# Patient Record
Sex: MALE | Race: WHITE | NOT HISPANIC OR LATINO | Employment: FULL TIME | URBAN - METROPOLITAN AREA
[De-identification: names, ages, dates, MRNs, and addresses within clinical notes are randomized per-mention and may not be internally consistent; named-entity substitution may affect disease eponyms.]

---

## 2018-03-01 ENCOUNTER — OFFICE VISIT (OUTPATIENT)
Dept: FAMILY MEDICINE CLINIC | Facility: CLINIC | Age: 58
End: 2018-03-01
Payer: COMMERCIAL

## 2018-03-01 VITALS
SYSTOLIC BLOOD PRESSURE: 150 MMHG | HEIGHT: 68 IN | TEMPERATURE: 98.3 F | WEIGHT: 184 LBS | DIASTOLIC BLOOD PRESSURE: 80 MMHG | BODY MASS INDEX: 27.89 KG/M2 | HEART RATE: 83 BPM | OXYGEN SATURATION: 98 %

## 2018-03-01 DIAGNOSIS — E55.9 VITAMIN D DEFICIENCY: ICD-10-CM

## 2018-03-01 DIAGNOSIS — E11.42 TYPE 2 DIABETES MELLITUS WITH DIABETIC POLYNEUROPATHY, WITHOUT LONG-TERM CURRENT USE OF INSULIN (HCC): ICD-10-CM

## 2018-03-01 DIAGNOSIS — E78.5 HYPERLIPIDEMIA ASSOCIATED WITH TYPE 2 DIABETES MELLITUS (HCC): ICD-10-CM

## 2018-03-01 DIAGNOSIS — I10 ESSENTIAL HYPERTENSION: ICD-10-CM

## 2018-03-01 DIAGNOSIS — Z12.5 PROSTATE CANCER SCREENING: ICD-10-CM

## 2018-03-01 DIAGNOSIS — E11.69 HYPERLIPIDEMIA ASSOCIATED WITH TYPE 2 DIABETES MELLITUS (HCC): ICD-10-CM

## 2018-03-01 DIAGNOSIS — F11.20 METHADONE MAINTENANCE THERAPY PATIENT (HCC): ICD-10-CM

## 2018-03-01 DIAGNOSIS — T73.2XXA FATIGUE DUE TO EXPOSURE, INITIAL ENCOUNTER: ICD-10-CM

## 2018-03-01 DIAGNOSIS — B18.2 CHRONIC HEPATITIS C WITHOUT HEPATIC COMA (HCC): Primary | ICD-10-CM

## 2018-03-01 DIAGNOSIS — I87.8 VENOUS STASIS: ICD-10-CM

## 2018-03-01 PROCEDURE — 99204 OFFICE O/P NEW MOD 45 MIN: CPT | Performed by: NURSE PRACTITIONER

## 2018-03-01 RX ORDER — LISINOPRIL 20 MG/1
20 TABLET ORAL
COMMUNITY
End: 2018-03-01 | Stop reason: SDUPTHER

## 2018-03-01 RX ORDER — METHADONE HYDROCHLORIDE 10 MG/5ML
70 SOLUTION ORAL
COMMUNITY
End: 2021-02-25 | Stop reason: HOSPADM

## 2018-03-01 RX ORDER — PRAVASTATIN SODIUM 20 MG
20 TABLET ORAL
COMMUNITY
End: 2018-03-01 | Stop reason: SDUPTHER

## 2018-03-01 RX ORDER — GLYBURIDE 5 MG/1
TABLET ORAL
COMMUNITY
Start: 2017-02-14 | End: 2018-03-01 | Stop reason: SDUPTHER

## 2018-03-01 RX ORDER — PRAVASTATIN SODIUM 20 MG
20 TABLET ORAL DAILY
Qty: 30 TABLET | Refills: 3 | Status: SHIPPED | OUTPATIENT
Start: 2018-03-01 | End: 2018-09-09 | Stop reason: SDUPTHER

## 2018-03-01 RX ORDER — GLYBURIDE 5 MG/1
5 TABLET ORAL 2 TIMES DAILY WITH MEALS
Qty: 60 TABLET | Refills: 3 | Status: SHIPPED | OUTPATIENT
Start: 2018-03-01 | End: 2018-03-29 | Stop reason: SDUPTHER

## 2018-03-01 RX ORDER — LISINOPRIL 20 MG/1
20 TABLET ORAL DAILY
Qty: 30 TABLET | Refills: 3 | Status: SHIPPED | OUTPATIENT
Start: 2018-03-01 | End: 2018-05-01 | Stop reason: SDUPTHER

## 2018-03-01 NOTE — PROGRESS NOTES
Assessment/Plan:    No problem-specific Assessment & Plan notes found for this encounter  Diagnoses and all orders for this visit:    Chronic hepatitis C without hepatic coma ([facility] )  Comments:  Pt follows with Dr Jorje Garcia has been treated for Hepatits C  and is reportedly free of Hep C    Type 2 diabetes mellitus with diabetic polyneuropathy, without long-term current use of insulin ([place] [address] )  Comments:  Rx for Janumet and Glyburide ordered  Labwork ordered  Orders:  -     UA w Reflex to Microscopic w Reflex to Culture -Lab Collect  -     Comprehensive metabolic panel  -     CBC and differential  -     TSH baseline; Future  -     HEMOGLOBIN A1C W/ EAG ESTIMATION  -     Lipid panel  -     C-peptide; Future  -     lisinopril (ZESTRIL) 20 mg tablet; Take 1 tablet (20 mg total) by mouth daily  -     sitaGLIPtin-metFORMIN (JANUMET)  MG per tablet; Take 1 tablet by mouth daily  -     glyBURIDE (DIABETA) 5 mg tablet; Take 1 tablet (5 mg total) by mouth 2 (two) times a day with meals  -     Occult Bloood,Fecal Immunochemical; Future    Essential hypertension  Comments:  Bp 150/80 today   Lisinopril reorderd  Labwork ordered  Orders:  -     UA w Reflex to Microscopic w Reflex to Culture -Lab Collect  -     Comprehensive metabolic panel  -     CBC and differential  -     TSH baseline; Future  -     HEMOGLOBIN A1C W/ EAG ESTIMATION  -     Lipid panel  -     lisinopril (ZESTRIL) 20 mg tablet; Take 1 tablet (20 mg total) by mouth daily  -     sitaGLIPtin-metFORMIN (JANUMET)  MG per tablet; Take 1 tablet by mouth daily  -     Occult Bloood,Fecal Immunochemical; Future    Hyperlipidemia associated with type 2 diabetes mellitus ([place] [address] )  Comments:  labwork ordered  Orders:  -     Comprehensive metabolic panel  -     CBC and differential  -     TSH baseline; Future  -     HEMOGLOBIN A1C W/ EAG ESTIMATION  -     Lipid panel  -     lisinopril (ZESTRIL) 20 mg tablet;  Take 1 tablet (20 mg total) by mouth daily  - sitaGLIPtin-metFORMIN (JANUMET)  MG per tablet; Take 1 tablet by mouth daily  -     pravastatin (PRAVACHOL) 20 mg tablet; Take 1 tablet (20 mg total) by mouth daily    Methadone maintenance therapy patient [facility])  Comments:  pt follows with Methadone clinic is on daily Methadone    Fatigue due to exposure, initial encounter  Comments:  Labwork ordered  Orders:  -     CBC and differential  -     TSH baseline; Future  -     HEMOGLOBIN A1C W/ EAG ESTIMATION  -     Lipid panel  -     Occult Bloood,Fecal Immunochemical; Future    Vitamin D deficiency  Comments:  Labwork ordered  Orders:  -     Vitamin D 25 hydroxy    Venous stasis  Comments:  Pt instructed to apply lotion daily to lower extremities to avoid skin breakdown  Orders:  -     Ambulatory referral to Podiatry; Future    Prostate cancer screening  Comments:  Labwork ordered  Orders:  -     PSA, total and free    Other orders  -     Discontinue: glyBURIDE (DIABETA) 5 mg tablet;   -     Canagliflozin (INVOKANA) 300 MG TABS;   -     Discontinue: lisinopril (ZESTRIL) 20 mg tablet; Take 20 mg by mouth  -     Discontinue: pravastatin (PRAVACHOL) 20 mg tablet; Take 20 mg by mouth  -     Discontinue: sitaGLIPtin-metFORMIN (JANUMET)  MG per tablet; Take 1 tablet by mouth  -     methadone (DOLOPHINE) 10 MG/5ML solution; Take 70 mg by mouth          Subjective:      Patient ID: Marcos Francois is a 59 y o  male here to [facility]   Pt was a former pt of Dr Lianet Child and has not been seen since March 2017  Pt has received Harvoni for Hep C from MD in [facility] and reportedly is free of Hepatitis C at this time  Pt also follows with Methadone clinic in [place]  weekly  Pt reports he hasn't been feeling well is known diabetic who ran out of his medication      HPI    The following portions of the patient's history were reviewed and updated as appropriate:   He  has no past medical history on file    He   Patient Active Problem List    Diagnosis Date Noted    Fatigue due to exposure 04/14/2017    Essential hypertension 04/13/2017    Hyperlipidemia associated with type 2 diabetes mellitus ([facility] ) 04/13/2017    Methadone maintenance therapy patient ([place] [address] ) 04/13/2017    Type 2 diabetes mellitus with diabetic polyneuropathy ([place] [address] ) 04/13/2017    Chronic hepatitis C without hepatic coma ([place] [address] ) 02/27/2017     He  has no past surgical history on file  His family history is not on file  He  reports that he has quit smoking  He has never used smokeless tobacco  He reports that he does not drink alcohol or use drugs  Current Outpatient Prescriptions   Medication Sig Dispense Refill    Canagliflozin (INVOKANA) 300 MG TABS       glyBURIDE (DIABETA) 5 mg tablet Take 1 tablet (5 mg total) by mouth 2 (two) times a day with meals 60 tablet 3    lisinopril (ZESTRIL) 20 mg tablet Take 1 tablet (20 mg total) by mouth daily 30 tablet 3    methadone (DOLOPHINE) 10 MG/5ML solution Take 70 mg by mouth      pravastatin (PRAVACHOL) 20 mg tablet Take 1 tablet (20 mg total) by mouth daily 30 tablet 3    sitaGLIPtin-metFORMIN (JANUMET)  MG per tablet Take 1 tablet by mouth daily 60 tablet 3     No current facility-administered medications for this visit  No current outpatient prescriptions on file prior to visit  No current facility-administered medications on file prior to visit  He has No Known Allergies       Review of Systems   Constitutional: Negative  Negative for appetite change and fatigue  HENT: Negative  Negative for congestion, ear pain, rhinorrhea and sinus pain  Eyes: Positive for visual disturbance  Negative for pain and itching  Respiratory: Negative  Negative for cough, shortness of breath and wheezing  Cardiovascular: Negative  Negative for chest pain, palpitations and leg swelling  Gastrointestinal: Negative  Negative for abdominal pain, constipation, diarrhea, nausea and vomiting  Endocrine: Negative    Negative for polydipsia, polyphagia and polyuria  Genitourinary: Negative  Negative for difficulty urinating, frequency and urgency  Musculoskeletal: Negative  Negative for back pain and joint swelling  Skin: Positive for color change  Negative for rash and wound  Allergic/Immunologic: Negative  Neurological: Negative  Negative for dizziness, weakness and headaches  Tingling lower extremities   Hematological: Negative  Negative for adenopathy  Does not bruise/bleed easily  Psychiatric/Behavioral: Negative  Negative for behavioral problems and sleep disturbance  The patient is not nervous/anxious  Objective:      /80   Pulse 83   Temp 98 3 °F (36 8 °C)   Ht 5' 8" (1 727 m)   Wt 83 5 kg (184 lb)   SpO2 98%   BMI 27 98 kg/m²          Physical Exam   Constitutional: He is oriented to person, place, and time  He appears well-developed and well-nourished  HENT:   Head: Normocephalic  Eyes: Conjunctivae and EOM are normal  Pupils are equal, round, and reactive to light  Neck: Normal range of motion  Neck supple  Cardiovascular: Normal rate and regular rhythm  Pulmonary/Chest: Effort normal and breath sounds normal    Abdominal: Soft  Bowel sounds are normal    Musculoskeletal: Normal range of motion  Neurological: He is alert and oriented to person, place, and time  Skin: Skin is warm and dry  Venous stasis dermatitis rash noted bilateral lower extremities   Psychiatric: He has a normal mood and affect  His behavior is normal  Judgment and thought content normal    Nursing note and vitals reviewed

## 2018-03-01 NOTE — PATIENT INSTRUCTIONS
Please purchase [employer] diabetic lotion and apply daily to both legs and feet    Stasis Dermatitis   WHAT YOU NEED TO KNOW:   Stasis dermatitis is a condition that develops when blood pools in your lower legs  It is caused by poor blood flow back to your heart  DISCHARGE INSTRUCTIONS:   Call 911 for any of the following:   · You feel lightheaded, short of breath, and have chest pain  · You cough up blood  Return to the emergency department if:   · Your leg feels warm, tender, and painful  It may look swollen and red  Contact your healthcare provider if:   · You have a fever  · Your pain is not getting better, even with treatment  · You have new or worse open sores  · Your sores are draining pus  · Your movement is limited  · You have questions or concerns about your condition or care  Medicines:   · Medicines  help improve blood flow from your legs to your heart and decrease swelling  · Take your medicine as directed  Contact your healthcare provider if you think your medicine is not helping or if you have side effects  Tell him of her if you are allergic to any medicine  Keep a list of the medicines, vitamins, and herbs you take  Include the amounts, and when and why you take them  Bring the list or the pill bottles to follow-up visits  Carry your medicine list with you in case of an emergency  Manage your symptoms:   · Wear pressure stockings  These tight elastic stockings put pressure on your legs  This improves blood flow and prevents blood from collecting in your legs  · Elevate  your legs above the level of your heart for 30 minutes, 4 times a day  This will help decrease swelling and pain  Prop your legs on pillows or blankets to keep them elevated comfortably  · Apply unscented lotions or creams  to help keep your skin moist and decrease itching  · Do not scratch your legs  Your skin can break open if you scratch   This can lead to sores or an infection  Lifestyle changes:   · Maintain a healthy weight  Ask your healthcare provider how much you should weigh  Ask him to help you create a weight loss plan if you are overweight  This will help improve your blood flow  · Eat a variety of healthy foods  Healthy foods include fruits, vegetables, whole-grain breads, low-fat dairy products, beans, lean meats, and fish  You may need to eat foods that are low in salt to help decrease swelling in your legs  · Exercise regularly  Ask your healthcare provider about the best exercise plan for you  Exercise improves the blood flow in your legs  Follow up with your healthcare provider as directed:  Write down your questions so you remember to ask them during your visits  © 2017 [address] Information is for End User's use only and may not be sold, redistributed or otherwise used for commercial purposes  All illustrations and images included in CareNotes® are the copyrighted property of [employer] , [employer]  or [employer]  The above information is an  only  It is not intended as medical advice for individual conditions or treatments  Talk to your doctor, nurse or pharmacist before following any medical regimen to see if it is safe and effective for you

## 2018-03-06 ENCOUNTER — APPOINTMENT (OUTPATIENT)
Dept: LAB | Facility: CLINIC | Age: 58
End: 2018-03-06
Payer: COMMERCIAL

## 2018-03-06 DIAGNOSIS — E11.42 TYPE 2 DIABETES MELLITUS WITH DIABETIC POLYNEUROPATHY, WITHOUT LONG-TERM CURRENT USE OF INSULIN (HCC): ICD-10-CM

## 2018-03-06 DIAGNOSIS — E11.69 HYPERLIPIDEMIA ASSOCIATED WITH TYPE 2 DIABETES MELLITUS (HCC): ICD-10-CM

## 2018-03-06 DIAGNOSIS — E78.5 HYPERLIPIDEMIA ASSOCIATED WITH TYPE 2 DIABETES MELLITUS (HCC): ICD-10-CM

## 2018-03-06 DIAGNOSIS — T73.2XXA FATIGUE DUE TO EXPOSURE, INITIAL ENCOUNTER: ICD-10-CM

## 2018-03-06 DIAGNOSIS — I10 ESSENTIAL HYPERTENSION: ICD-10-CM

## 2018-03-06 LAB
25(OH)D3 SERPL-MCNC: 11.5 NG/ML (ref 30–100)
ALBUMIN SERPL BCP-MCNC: 3.8 G/DL (ref 3.5–5)
ALP SERPL-CCNC: 125 U/L (ref 46–116)
ALT SERPL W P-5'-P-CCNC: 26 U/L (ref 12–78)
ANION GAP SERPL CALCULATED.3IONS-SCNC: 4 MMOL/L (ref 4–13)
AST SERPL W P-5'-P-CCNC: 18 U/L (ref 5–45)
BACTERIA UR QL AUTO: NORMAL /HPF
BASOPHILS # BLD AUTO: 0.04 THOUSANDS/ΜL (ref 0–0.1)
BASOPHILS NFR BLD AUTO: 1 % (ref 0–1)
BILIRUB SERPL-MCNC: 0.97 MG/DL (ref 0.2–1)
BILIRUB UR QL STRIP: NEGATIVE
BUN SERPL-MCNC: 18 MG/DL (ref 5–25)
CALCIUM SERPL-MCNC: 9 MG/DL (ref 8.3–10.1)
CHLORIDE SERPL-SCNC: 99 MMOL/L (ref 100–108)
CHOLEST SERPL-MCNC: 148 MG/DL (ref 50–200)
CLARITY UR: ABNORMAL
CO2 SERPL-SCNC: 30 MMOL/L (ref 21–32)
COLOR UR: ABNORMAL
CREAT SERPL-MCNC: 1.04 MG/DL (ref 0.6–1.3)
EOSINOPHIL # BLD AUTO: 0.24 THOUSAND/ΜL (ref 0–0.61)
EOSINOPHIL NFR BLD AUTO: 3 % (ref 0–6)
ERYTHROCYTE [DISTWIDTH] IN BLOOD BY AUTOMATED COUNT: 12.3 % (ref 11.6–15.1)
EST. AVERAGE GLUCOSE BLD GHB EST-MCNC: 315 MG/DL
GFR SERPL CREATININE-BSD FRML MDRD: 78 ML/MIN/1.73SQ M
GLUCOSE P FAST SERPL-MCNC: 228 MG/DL (ref 65–99)
GLUCOSE UR STRIP-MCNC: ABNORMAL MG/DL
HBA1C MFR BLD: 12.6 % (ref 4.2–6.3)
HCT VFR BLD AUTO: 44 % (ref 36.5–49.3)
HDLC SERPL-MCNC: 33 MG/DL (ref 40–60)
HGB BLD-MCNC: 15.6 G/DL (ref 12–17)
HGB UR QL STRIP.AUTO: ABNORMAL
KETONES UR STRIP-MCNC: NEGATIVE MG/DL
LDLC SERPL CALC-MCNC: 73 MG/DL (ref 0–100)
LEUKOCYTE ESTERASE UR QL STRIP: ABNORMAL
LYMPHOCYTES # BLD AUTO: 2.9 THOUSANDS/ΜL (ref 0.6–4.47)
LYMPHOCYTES NFR BLD AUTO: 39 % (ref 14–44)
MCH RBC QN AUTO: 30.8 PG (ref 26.8–34.3)
MCHC RBC AUTO-ENTMCNC: 35.5 G/DL (ref 31.4–37.4)
MCV RBC AUTO: 87 FL (ref 82–98)
MONOCYTES # BLD AUTO: 0.67 THOUSAND/ΜL (ref 0.17–1.22)
MONOCYTES NFR BLD AUTO: 9 % (ref 4–12)
MUCOUS THREADS UR QL AUTO: NORMAL
NEUTROPHILS # BLD AUTO: 3.59 THOUSANDS/ΜL (ref 1.85–7.62)
NEUTS SEG NFR BLD AUTO: 48 % (ref 43–75)
NITRITE UR QL STRIP: NEGATIVE
NON-SQ EPI CELLS URNS QL MICRO: NORMAL /HPF
NRBC BLD AUTO-RTO: 0 /100 WBCS
OTHER STN SPEC: NORMAL
PH UR STRIP.AUTO: 5.5 [PH] (ref 4.5–8)
PLATELET # BLD AUTO: 160 THOUSANDS/UL (ref 149–390)
PMV BLD AUTO: 13.1 FL (ref 8.9–12.7)
POTASSIUM SERPL-SCNC: 4.1 MMOL/L (ref 3.5–5.3)
PROT SERPL-MCNC: 9.1 G/DL (ref 6.4–8.2)
PROT UR STRIP-MCNC: ABNORMAL MG/DL
RBC # BLD AUTO: 5.06 MILLION/UL (ref 3.88–5.62)
RBC #/AREA URNS AUTO: NORMAL /HPF
SODIUM SERPL-SCNC: 133 MMOL/L (ref 136–145)
SP GR UR STRIP.AUTO: 1.03 (ref 1–1.03)
TRIGL SERPL-MCNC: 208 MG/DL
TSH SERPL DL<=0.05 MIU/L-ACNC: 2.85 UIU/ML
UROBILINOGEN UR QL STRIP.AUTO: 0.2 E.U./DL
WBC # BLD AUTO: 7.45 THOUSAND/UL (ref 4.31–10.16)
WBC #/AREA URNS AUTO: NORMAL /HPF

## 2018-03-06 PROCEDURE — 84443 ASSAY THYROID STIM HORMONE: CPT

## 2018-03-06 PROCEDURE — 84154 ASSAY OF PSA FREE: CPT | Performed by: NURSE PRACTITIONER

## 2018-03-06 PROCEDURE — 85025 COMPLETE CBC W/AUTO DIFF WBC: CPT | Performed by: NURSE PRACTITIONER

## 2018-03-06 PROCEDURE — 80061 LIPID PANEL: CPT | Performed by: NURSE PRACTITIONER

## 2018-03-06 PROCEDURE — 81001 URINALYSIS AUTO W/SCOPE: CPT | Performed by: NURSE PRACTITIONER

## 2018-03-06 PROCEDURE — 83036 HEMOGLOBIN GLYCOSYLATED A1C: CPT | Performed by: NURSE PRACTITIONER

## 2018-03-06 PROCEDURE — 80053 COMPREHEN METABOLIC PANEL: CPT | Performed by: NURSE PRACTITIONER

## 2018-03-06 PROCEDURE — 84153 ASSAY OF PSA TOTAL: CPT | Performed by: NURSE PRACTITIONER

## 2018-03-06 PROCEDURE — 36415 COLL VENOUS BLD VENIPUNCTURE: CPT | Performed by: NURSE PRACTITIONER

## 2018-03-06 PROCEDURE — 84681 ASSAY OF C-PEPTIDE: CPT

## 2018-03-06 PROCEDURE — 82306 VITAMIN D 25 HYDROXY: CPT | Performed by: NURSE PRACTITIONER

## 2018-03-07 LAB — C PEPTIDE SERPL-MCNC: 4.6 NG/ML (ref 1.1–4.4)

## 2018-03-08 LAB
PSA FREE MFR SERPL: 35 %
PSA FREE SERPL-MCNC: 0.07 NG/ML
PSA SERPL-MCNC: 0.2 NG/ML (ref 0–4)

## 2018-03-12 ENCOUNTER — APPOINTMENT (OUTPATIENT)
Dept: LAB | Facility: CLINIC | Age: 58
End: 2018-03-12
Payer: COMMERCIAL

## 2018-03-12 DIAGNOSIS — E11.42 TYPE 2 DIABETES MELLITUS WITH DIABETIC POLYNEUROPATHY, WITHOUT LONG-TERM CURRENT USE OF INSULIN (HCC): ICD-10-CM

## 2018-03-12 DIAGNOSIS — I10 ESSENTIAL HYPERTENSION: ICD-10-CM

## 2018-03-12 DIAGNOSIS — T73.2XXA FATIGUE DUE TO EXPOSURE, INITIAL ENCOUNTER: ICD-10-CM

## 2018-03-12 LAB — HEMOCCULT STL QL IA: NEGATIVE

## 2018-03-12 PROCEDURE — G0328 FECAL BLOOD SCRN IMMUNOASSAY: HCPCS

## 2018-03-28 NOTE — PROGRESS NOTES
Assessment/Plan:    No problem-specific Assessment & Plan notes found for this encounter  Diagnoses and all orders for this visit:    Type 2 diabetes mellitus with complication, unspecified long term insulin use status ([facility] )  Comments:  Pt reports  at home, pt works 3rd shift  labs HGBA1C 12 8  and , will stop Invokana  Start Victoza  Orders:  -     Diabetic foot exam  -     Ambulatory referral to Optometry; Future  -     Liraglutide 18 MG/3ML SOPN; Inject 0 3 mL under the skin daily  -     Glucose, fasting; Future    Type 2 diabetes mellitus with diabetic polyneuropathy, without long-term current use of insulin ([facility])  Comments:  Rx for Janumet and Glyburide ordered  Labwork ordered  Orders:  -     sitaGLIPtin-metFORMIN (JANUMET)  MG per tablet; Take 1 tablet by mouth 2 (two) times a day with meals  -     glyBURIDE (DIABETA) 5 mg tablet; Take 1 tablet (5 mg total) by mouth 2 (two) times a day with meals    Essential hypertension  Comments:  Bp 142/90today   Labwork ordered  Orders:  -     sitaGLIPtin-metFORMIN (JANUMET)  MG per tablet; Take 1 tablet by mouth 2 (two) times a day with meals    Hyperlipidemia associated with type 2 diabetes mellitus ([place] [address] )  Comments:  Triglycerides 208 will defer medication as pt is trying to modify his diet and exercise    Orders:  -     sitaGLIPtin-metFORMIN (JANUMET)  MG per tablet; Take 1 tablet by mouth 2 (two) times a day with meals      Right Foot/Ankle   Right Foot Inspection  Skin Exam: skin normal and skin intact no dry skin, no warmth, no callus, no erythema, no maceration, no abnormal color, no pre-ulcer, no ulcer and no callus                          Toe Exam: ROM and strength within normal limits  Sensory   Vibration: intact  Proprioception: intact   Monofilament testing: intact  Vascular  Capillary refills: elevated  The right DP pulse is 2+  The right PT pulse is 2+       Left Foot/Ankle  Left Foot Inspection  Skin Exam: skin intact       Pre-Ulcer Size (cm): 1                  Toe Exam: ROM and strength within normal limits, swelling and tenderness                   Sensory   Vibration: intact  Proprioception: intact  Monofilament: intact  Vascular  Capillary refills: elevated  The left DP pulse is 2+  The left PT pulse is 2+  Subjective:      Patient ID: Libby Gomez is a 59 y o  male returns for lab review  Pt was out of his medication for several months prior to last visit  FBS: 228, HgbA1C 12 8  Triglycerides:208 will defer medication as pt is enc to continue healthy diet    HPI    The following portions of the patient's history were reviewed and updated as appropriate:   He  has a past medical history of Diabetes mellitus ([place] [address] ) and Hypertension  He   Patient Active Problem List    Diagnosis Date Noted    Fatigue due to exposure 04/14/2017    Essential hypertension 04/13/2017    Hyperlipidemia associated with type 2 diabetes mellitus ([place] [address] ) 04/13/2017    Methadone maintenance therapy patient ([place] [address] ) 04/13/2017    Type 2 diabetes mellitus with diabetic polyneuropathy ([place] [address] ) 04/13/2017    Chronic hepatitis C without hepatic coma ([place] [address] ) 02/27/2017     He  has no past surgical history on file  His family history includes Diabetes in his mother; Hypertension in his father  He  reports that he has quit smoking  He has never used smokeless tobacco  He reports that he does not drink alcohol or use drugs    Current Outpatient Prescriptions   Medication Sig Dispense Refill    glyBURIDE (DIABETA) 5 mg tablet Take 1 tablet (5 mg total) by mouth 2 (two) times a day with meals 60 tablet 0    lisinopril (ZESTRIL) 20 mg tablet Take 1 tablet (20 mg total) by mouth daily 30 tablet 3    methadone (DOLOPHINE) 10 MG/5ML solution Take 70 mg by mouth      pravastatin (PRAVACHOL) 20 mg tablet Take 1 tablet (20 mg total) by mouth daily 30 tablet 3    sitaGLIPtin-metFORMIN (JANUMET)  MG per tablet Take 1 tablet by mouth 2 (two) times a day with meals 180 tablet 3    Liraglutide 18 MG/3ML SOPN Inject 0 3 mL under the skin daily 30 pen 6     No current facility-administered medications for this visit  Current Outpatient Prescriptions on File Prior to Visit   Medication Sig    lisinopril (ZESTRIL) 20 mg tablet Take 1 tablet (20 mg total) by mouth daily    methadone (DOLOPHINE) 10 MG/5ML solution Take 70 mg by mouth    pravastatin (PRAVACHOL) 20 mg tablet Take 1 tablet (20 mg total) by mouth daily    [DISCONTINUED] glyBURIDE (DIABETA) 5 mg tablet Take 1 tablet (5 mg total) by mouth 2 (two) times a day with meals    [DISCONTINUED] sitaGLIPtin-metFORMIN (JANUMET)  MG per tablet Take 1 tablet by mouth daily    [DISCONTINUED] Canagliflozin (INVOKANA) 300 MG TABS      No current facility-administered medications on file prior to visit  He has No Known Allergies       Review of Systems   Constitutional: Negative  Negative for appetite change and fatigue  HENT: Negative  Negative for congestion, ear pain, rhinorrhea and sinus pain  Eyes: Negative  Negative for pain, itching and visual disturbance  Respiratory: Negative  Negative for cough, shortness of breath and wheezing  Cardiovascular: Negative  Negative for chest pain, palpitations and leg swelling  Gastrointestinal: Negative  Negative for abdominal pain, constipation, diarrhea, nausea and vomiting  Endocrine: Negative  Negative for polydipsia, polyphagia and polyuria  Genitourinary: Negative  Negative for difficulty urinating, frequency and urgency  Musculoskeletal: Negative  Negative for back pain and joint swelling  Skin: Positive for wound  Negative for rash  Left toenail removed by podiatry   Allergic/Immunologic: Negative  Neurological: Negative  Negative for dizziness, weakness and headaches  Hematological: Negative  Negative for adenopathy  Does not bruise/bleed easily  Psychiatric/Behavioral: Negative    Negative for behavioral problems and sleep disturbance  The patient is not nervous/anxious  Objective:      /90   Pulse 100   Temp 98 7 °F (37 1 °C)   Ht 5' 8" (1 727 m)   Wt 83 5 kg (184 lb)   SpO2 99%   BMI 27 98 kg/m²          Physical Exam   Constitutional: He is oriented to person, place, and time  He appears well-developed and well-nourished  HENT:   Head: Normocephalic  Eyes: Conjunctivae and EOM are normal  Pupils are equal, round, and reactive to light  Neck: Normal range of motion  Neck supple  Cardiovascular: Normal rate and regular rhythm  Pulses:       Dorsalis pedis pulses are 2+ on the right side, and 2+ on the left side  Posterior tibial pulses are 2+ on the right side, and 2+ on the left side  Pulmonary/Chest: Effort normal and breath sounds normal    Abdominal: Soft  Bowel sounds are normal    Musculoskeletal: Normal range of motion  Feet:    Feet:   Right Foot:   Skin Integrity: Negative for ulcer, skin breakdown, erythema, warmth, callus or dry skin  Neurological: He is alert and oriented to person, place, and time  Skin: Skin is warm and dry  Psychiatric: He has a normal mood and affect  His behavior is normal  Judgment and thought content normal    Nursing note and vitals reviewed

## 2018-03-29 ENCOUNTER — TELEPHONE (OUTPATIENT)
Dept: FAMILY MEDICINE CLINIC | Facility: CLINIC | Age: 58
End: 2018-03-29

## 2018-03-29 ENCOUNTER — OFFICE VISIT (OUTPATIENT)
Dept: FAMILY MEDICINE CLINIC | Facility: CLINIC | Age: 58
End: 2018-03-29
Payer: COMMERCIAL

## 2018-03-29 VITALS
DIASTOLIC BLOOD PRESSURE: 90 MMHG | HEIGHT: 68 IN | WEIGHT: 184 LBS | TEMPERATURE: 98.7 F | BODY MASS INDEX: 27.89 KG/M2 | OXYGEN SATURATION: 99 % | SYSTOLIC BLOOD PRESSURE: 142 MMHG | HEART RATE: 100 BPM

## 2018-03-29 DIAGNOSIS — I10 ESSENTIAL HYPERTENSION: ICD-10-CM

## 2018-03-29 DIAGNOSIS — E11.8 TYPE 2 DIABETES MELLITUS WITH COMPLICATION, UNSPECIFIED LONG TERM INSULIN USE STATUS: Primary | ICD-10-CM

## 2018-03-29 DIAGNOSIS — E11.42 TYPE 2 DIABETES MELLITUS WITH DIABETIC POLYNEUROPATHY, WITHOUT LONG-TERM CURRENT USE OF INSULIN (HCC): ICD-10-CM

## 2018-03-29 DIAGNOSIS — E11.69 HYPERLIPIDEMIA ASSOCIATED WITH TYPE 2 DIABETES MELLITUS (HCC): ICD-10-CM

## 2018-03-29 DIAGNOSIS — E78.5 HYPERLIPIDEMIA ASSOCIATED WITH TYPE 2 DIABETES MELLITUS (HCC): ICD-10-CM

## 2018-03-29 PROCEDURE — 99214 OFFICE O/P EST MOD 30 MIN: CPT | Performed by: NURSE PRACTITIONER

## 2018-03-29 RX ORDER — GLYBURIDE 5 MG/1
5 TABLET ORAL 2 TIMES DAILY WITH MEALS
Qty: 60 TABLET | Refills: 0 | Status: SHIPPED | OUTPATIENT
Start: 2018-03-29 | End: 2018-10-11 | Stop reason: ALTCHOICE

## 2018-03-29 NOTE — PATIENT INSTRUCTIONS
Liraglutide (By injection)   Liraglutide ([id number])  Treats type 2 diabetes  Also used to help with weight loss in certain patients  Brand Name(s): Saxenda, Victoza   There may be other brand names for this medicine  When This Medicine Should Not Be Used: This medicine is not right for everyone  Do not use it if you had an allergic reaction to liraglutide, you have a multiple endocrine neoplasia syndrome type 2 (MEN 2), or if you or anyone in your family had medullary thyroid cancer  Tell your doctor if you are pregnant or become pregnant while you are using this medicine  How to Use This Medicine:   Injectable  · Your doctor will prescribe your exact dose and tell you how often it should be given  This medicine is given as a shot under the skin of your stomach, thighs, or upper arms  · If you use insulin in addition to this medicine, do not mix them into the same syringe  You may give the shots in the same area (such as your stomach), but do not give the shots right next to each other  · You may be taught how to give your medicine at home  Make sure you understand all instructions before giving yourself an injection  Do not use more medicine or use it more often than your doctor tells you to  · You will be shown the body areas where this shot can be given  Use a different body area each time you give yourself a shot  Keep track of where you give each shot to make sure you rotate body areas  · Use a new needle and syringe each time you inject your medicine  · Never share medicine pens with others under any circumstances  Sharing needles or pens can result in transmission of infection  · Drink extra fluids so you will urinate more often and help prevent kidney problems  · This medicine should come with a Medication Guide  Ask your pharmacist for a copy if you do not have one  · Missed dose: If you miss a dose of this medicine, use it as soon as you remember   Then take your next daily dose as usual on the following day  Never take extra medicine to make up for a missed dose  If you miss a dose for 3 days or more, call your doctor to talk about how to restart your treatment  · Store your new, unused medicine pen in the refrigerator, in the original carton, and protect it from light  Do not freeze this medicine, and do not use the medicine if it has been frozen  You may store the opened medicine pen in the refrigerator or at room temperature for 30 days  Throw away your used pen after 30 days, even if it still has medicine in it  Remove the needle from the pen before you store it  · Throw away used needles in a hard, closed container that the needles cannot poke through  Keep this container away from children and pets  Drugs and Foods to Avoid:      Ask your doctor or pharmacist before using any other medicine, including over-the-counter medicines, vitamins, and herbal products  Warnings While Using This Medicine:   · Tell your doctor if you are breastfeeding, or if you have kidney disease, liver disease, digestion problems (such as gastroparesis), gallbladder disease, or a history of pancreas problems, depression, or angioedema (swelling of the arms, face, hands, mouth, or throat)  · Do not use Saxenda® if you are also using Victoza®  They contain the same medicine  · This medicine may cause the following problems:   ¨ An increased risk of thyroid tumor  ¨ Pancreatitis  ¨ Low blood sugar  ¨ Gallbladder problems, including gallstones (Saxenda®)  ¨ Thoughts of hurting yourself [facility])  · Your doctor will do lab tests at regular visits to check on the effects of this medicine  Keep all appointments  · Keep all medicine out of the reach of children  Never share your medicine with anyone    Possible Side Effects While Using This Medicine:   Call your doctor right away if you notice any of these side effects:  · Allergic reaction: Itching or hives, swelling in your face or hands, swelling or tingling in your mouth or throat, chest tightness, trouble breathing  · Change in how much or how often you urinate, or painful or burning urination  · Fast or racing heartbeat  · Feeling sad or depressed, thoughts of suicide, unusual changes in mood or behavior  · Shaking, trembling, sweating, fast or pounding heartbeat, fainting, hunger, confusion  · Sudden and severe stomach pain, nausea, vomiting, fever, and lightheadedness  · Trouble breathing or swallowing, a lump in your neck, hoarseness when speaking  · Yellow skin or eyes  If you notice these less serious side effects, talk with your doctor:   · Decreased appetite  · Dizziness or headache  · Nausea, diarrhea, constipation, or upset stomach  · Redness, itching, swelling, or any changes in your skin where the shot was given  If you notice other side effects that you think are caused by this medicine, tell your doctor  Call your doctor for medical advice about side effects  You may report side effects to FDA at 1-065-FDA-7882  © 2017 [address] Information is for End User's use only and may not be sold, redistributed or otherwise used for commercial purposes  The above information is an  only  It is not intended as medical advice for individual conditions or treatments  Talk to your doctor, nurse or pharmacist before following any medical regimen to see if it is safe and effective for you

## 2018-03-29 NOTE — TELEPHONE ENCOUNTER
The medication you sent over to the pharmacy is not covered by his insurance  Do you want me to try a prior auth or order another med?

## 2018-03-29 NOTE — TELEPHONE ENCOUNTER
He needs that medication hga1c is 12 8 on multiple agents put that one thru for rush cause insurance takes good ol time he failed on Invokana

## 2018-03-29 NOTE — TELEPHONE ENCOUNTER
Pharmacy called to question the amount of pens prescribed  It states 30  He is only in need of 3 per month with a dose of 0 3ml daily  I did have the pharmacy correct this verbally  Also it does not need a prior auth it was the amount that was making the price so high

## 2018-04-26 ENCOUNTER — APPOINTMENT (OUTPATIENT)
Dept: LAB | Facility: CLINIC | Age: 58
End: 2018-04-26
Payer: COMMERCIAL

## 2018-04-26 ENCOUNTER — TRANSCRIBE ORDERS (OUTPATIENT)
Dept: LAB | Facility: CLINIC | Age: 58
End: 2018-04-26

## 2018-04-26 DIAGNOSIS — E11.8 TYPE 2 DIABETES MELLITUS WITH COMPLICATION (HCC): ICD-10-CM

## 2018-04-26 LAB — GLUCOSE P FAST SERPL-MCNC: 160 MG/DL (ref 65–99)

## 2018-04-26 PROCEDURE — 36415 COLL VENOUS BLD VENIPUNCTURE: CPT

## 2018-04-26 PROCEDURE — 82947 ASSAY GLUCOSE BLOOD QUANT: CPT

## 2018-05-01 ENCOUNTER — OFFICE VISIT (OUTPATIENT)
Dept: FAMILY MEDICINE CLINIC | Facility: CLINIC | Age: 58
End: 2018-05-01
Payer: COMMERCIAL

## 2018-05-01 VITALS
HEIGHT: 68 IN | WEIGHT: 183 LBS | SYSTOLIC BLOOD PRESSURE: 178 MMHG | TEMPERATURE: 99 F | DIASTOLIC BLOOD PRESSURE: 100 MMHG | HEART RATE: 96 BPM | BODY MASS INDEX: 27.74 KG/M2 | OXYGEN SATURATION: 98 %

## 2018-05-01 DIAGNOSIS — E11.69 HYPERLIPIDEMIA ASSOCIATED WITH TYPE 2 DIABETES MELLITUS (HCC): ICD-10-CM

## 2018-05-01 DIAGNOSIS — E11.42 TYPE 2 DIABETES MELLITUS WITH DIABETIC POLYNEUROPATHY, WITHOUT LONG-TERM CURRENT USE OF INSULIN (HCC): ICD-10-CM

## 2018-05-01 DIAGNOSIS — E78.5 HYPERLIPIDEMIA ASSOCIATED WITH TYPE 2 DIABETES MELLITUS (HCC): ICD-10-CM

## 2018-05-01 DIAGNOSIS — E11.42 TYPE 2 DIABETES MELLITUS WITH DIABETIC POLYNEUROPATHY, WITHOUT LONG-TERM CURRENT USE OF INSULIN (HCC): Primary | ICD-10-CM

## 2018-05-01 DIAGNOSIS — I10 ESSENTIAL HYPERTENSION: ICD-10-CM

## 2018-05-01 PROCEDURE — 4010F ACE/ARB THERAPY RXD/TAKEN: CPT | Performed by: NURSE PRACTITIONER

## 2018-05-01 PROCEDURE — 99214 OFFICE O/P EST MOD 30 MIN: CPT | Performed by: NURSE PRACTITIONER

## 2018-05-01 RX ORDER — LISINOPRIL 20 MG/1
20 TABLET ORAL DAILY
Qty: 30 TABLET | Refills: 5 | Status: SHIPPED | OUTPATIENT
Start: 2018-05-01 | End: 2018-05-01 | Stop reason: SDUPTHER

## 2018-05-01 RX ORDER — LISINOPRIL 30 MG/1
30 TABLET ORAL DAILY
Qty: 90 TABLET | Refills: 3 | Status: SHIPPED | OUTPATIENT
Start: 2018-05-01 | End: 2018-11-27 | Stop reason: SDUPTHER

## 2018-05-01 NOTE — PATIENT INSTRUCTIONS
Liraglutide (By injection)   Liraglutide ([id number]-h-[id number]-tide)  Treats type 2 diabetes  Also used to help with weight loss in certain patients  Brand Name(s): Saxenda, Victoza   There may be other brand names for this medicine  When This Medicine Should Not Be Used: This medicine is not right for everyone  Do not use it if you had an allergic reaction to liraglutide, you have a multiple endocrine neoplasia syndrome type 2 (MEN 2), or if you or anyone in your family had medullary thyroid cancer  Tell your doctor if you are pregnant or become pregnant while you are using this medicine  How to Use This Medicine:   Injectable  · Your doctor will prescribe your exact dose and tell you how often it should be given  This medicine is given as a shot under the skin of your stomach, thighs, or upper arms  · If you use insulin in addition to this medicine, do not mix them into the same syringe  You may give the shots in the same area (such as your stomach), but do not give the shots right next to each other  · You may be taught how to give your medicine at home  Make sure you understand all instructions before giving yourself an injection  Do not use more medicine or use it more often than your doctor tells you to  · You will be shown the body areas where this shot can be given  Use a different body area each time you give yourself a shot  Keep track of where you give each shot to make sure you rotate body areas  · Use a new needle and syringe each time you inject your medicine  · Never share medicine pens with others under any circumstances  Sharing needles or pens can result in transmission of infection  · Drink extra fluids so you will urinate more often and help prevent kidney problems  · This medicine should come with a Medication Guide  Ask your pharmacist for a copy if you do not have one  · Missed dose: If you miss a dose of this medicine, use it as soon as you remember   Then take your next daily dose as usual on the following day  Never take extra medicine to make up for a missed dose  If you miss a dose for 3 days or more, call your doctor to talk about how to restart your treatment  · Store your new, unused medicine pen in the refrigerator, in the original carton, and protect it from light  Do not freeze this medicine, and do not use the medicine if it has been frozen  You may store the opened medicine pen in the refrigerator or at room temperature for 30 days  Throw away your used pen after 30 days, even if it still has medicine in it  Remove the needle from the pen before you store it  · Throw away used needles in a hard, closed container that the needles cannot poke through  Keep this container away from children and pets  Drugs and Foods to Avoid:      Ask your doctor or pharmacist before using any other medicine, including over-the-counter medicines, vitamins, and herbal products  Warnings While Using This Medicine:   · Tell your doctor if you are breastfeeding, or if you have kidney disease, liver disease, digestion problems (such as gastroparesis), gallbladder disease, or a history of pancreas problems, depression, or angioedema (swelling of the arms, face, hands, mouth, or throat)  · Do not use Saxenda® if you are also using Victoza®  They contain the same medicine  · This medicine may cause the following problems:   ¨ An increased risk of thyroid tumor  ¨ Pancreatitis  ¨ Low blood sugar  ¨ Gallbladder problems, including gallstones (Saxenda®)  ¨ Thoughts of hurting yourself Daria Roca)  · Your doctor will do lab tests at regular visits to check on the effects of this medicine  Keep all appointments  · Keep all medicine out of the reach of children  Never share your medicine with anyone    Possible Side Effects While Using This Medicine:   Call your doctor right away if you notice any of these side effects:  · Allergic reaction: Itching or hives, swelling in your face or hands, swelling or tingling in your mouth or throat, chest tightness, trouble breathing  · Change in how much or how often you urinate, or painful or burning urination  · Fast or racing heartbeat  · Feeling sad or depressed, thoughts of suicide, unusual changes in mood or behavior  · Shaking, trembling, sweating, fast or pounding heartbeat, fainting, hunger, confusion  · Sudden and severe stomach pain, nausea, vomiting, fever, and lightheadedness  · Trouble breathing or swallowing, a lump in your neck, hoarseness when speaking  · Yellow skin or eyes  If you notice these less serious side effects, talk with your doctor:   · Decreased appetite  · Dizziness or headache  · Nausea, diarrhea, constipation, or upset stomach  · Redness, itching, swelling, or any changes in your skin where the shot was given  If you notice other side effects that you think are caused by this medicine, tell your doctor  Call your doctor for medical advice about side effects  You may report side effects to FDA at 7-267-FDA-9323  © 2017 [employer] Information is for End User's use only and may not be sold, redistributed or otherwise used for commercial purposes  The above information is an  only  It is not intended as medical advice for individual conditions or treatments  Talk to your doctor, nurse or pharmacist before following any medical regimen to see if it is safe and effective for you

## 2018-05-01 NOTE — PROGRESS NOTES
Assessment/Plan:    No problem-specific Assessment & Plan notes found for this encounter  Diagnoses and all orders for this visit:    Type 2 diabetes mellitus with diabetic polyneuropathy, without long-term current use of insulin ([facility])  Comments:   pt maintaing with Glyburide, Januvamet, Victoza  Repeat labs end of june 2018  Orders:  -     lisinopril (ZESTRIL) 20 mg tablet; Take 1 tablet (20 mg total) by mouth daily  -     Liraglutide 18 MG/3ML SOPN; Inject 0 3 mL under the skin daily  -     Glucose, fasting; Future  -     C-peptide; Future  -     HEMOGLOBIN A1C W/ EAG ESTIMATION; Future  -     sitaGLIPtin-metFORMIN (JANUMET)  MG per tablet; Take 1 tablet by mouth 2 (two) times a day with meals    Essential hypertension  Comments:  bp rechecked and Zestoretic increased to 30mg daily    Type 2 diabetes mellitus with diabetic polyneuropathy, without long-term current use of insulin ([facility])  Comments:  Rx for Janumet and Glyburide ordered  Labwork ordered  Orders:  -     lisinopril (ZESTRIL) 20 mg tablet; Take 1 tablet (20 mg total) by mouth daily  -     Liraglutide 18 MG/3ML SOPN; Inject 0 3 mL under the skin daily  -     Glucose, fasting; Future  -     C-peptide; Future  -     HEMOGLOBIN A1C W/ EAG ESTIMATION; Future  -     sitaGLIPtin-metFORMIN (JANUMET)  MG per tablet; Take 1 tablet by mouth 2 (two) times a day with meals    Hyperlipidemia associated with type 2 diabetes mellitus ([address] )  Comments:  labwork ordered  Orders:  -     lisinopril (ZESTRIL) 20 mg tablet; Take 1 tablet (20 mg total) by mouth daily          Subjective:      Patient ID: Lee Ann Heart is a 59 y o  male for discussion of  and Victoza pt c/o intermittent nausea since going on the Victoza  Pt notes this coincides with 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   Pt is willing to try adjust the time he uses the Victoza on 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, giving it during the middle of his night shift versus before he goes to sleep in the am    Pt denies any further complaints  HPI    The following portions of the patient's history were reviewed and updated as appropriate:   He  has a past medical history of Diabetes mellitus ([facility] ) and Hypertension  He   Patient Active Problem List    Diagnosis Date Noted    Fatigue due to exposure 04/14/2017    Essential hypertension 04/13/2017    Hyperlipidemia associated with type 2 diabetes mellitus ([place] [id number] ) 04/13/2017    Methadone maintenance therapy patient ([place] [address] ) 04/13/2017    Type 2 diabetes mellitus with diabetic polyneuropathy ([place] [address] ) 04/13/2017    Chronic hepatitis C without hepatic coma ([place] [address] ) 02/27/2017     He  has no past surgical history on file  His family history includes Diabetes in his mother; Hypertension in his father  He  reports that he has quit smoking  He has never used smokeless tobacco  He reports that he does not drink alcohol or use drugs  Current Outpatient Prescriptions   Medication Sig Dispense Refill    glyBURIDE (DIABETA) 5 mg tablet Take 1 tablet (5 mg total) by mouth 2 (two) times a day with meals 60 tablet 0    Liraglutide 18 MG/3ML SOPN Inject 0 3 mL under the skin daily 30 pen 0    lisinopril (ZESTRIL) 20 mg tablet Take 1 tablet (20 mg total) by mouth daily 30 tablet 5    methadone (DOLOPHINE) 10 MG/5ML solution Take 70 mg by mouth      pravastatin (PRAVACHOL) 20 mg tablet Take 1 tablet (20 mg total) by mouth daily 30 tablet 3    sitaGLIPtin-metFORMIN (JANUMET)  MG per tablet Take 1 tablet by mouth 2 (two) times a day with meals 180 tablet 0     No current facility-administered medications for this visit        Current Outpatient Prescriptions on File Prior to Visit   Medication Sig    glyBURIDE (DIABETA) 5 mg tablet Take 1 tablet (5 mg total) by mouth 2 (two) times a day with meals    methadone (DOLOPHINE) 10 MG/5ML solution Take 70 mg by mouth    pravastatin (PRAVACHOL) 20 mg tablet Take 1 tablet (20 mg total) by mouth daily    [DISCONTINUED] Liraglutide 18 MG/3ML SOPN Inject 0 3 mL under the skin daily    [DISCONTINUED] lisinopril (ZESTRIL) 20 mg tablet Take 1 tablet (20 mg total) by mouth daily    [DISCONTINUED] sitaGLIPtin-metFORMIN (JANUMET)  MG per tablet Take 1 tablet by mouth 2 (two) times a day with meals     No current facility-administered medications on file prior to visit  He has No Known Allergies       Review of Systems   Constitutional: Negative  Negative for appetite change and fatigue  HENT: Negative  Negative for congestion, ear pain, rhinorrhea and sinus pain  Eyes: Negative  Negative for pain, itching and visual disturbance  Respiratory: Negative  Negative for cough, shortness of breath and wheezing  Cardiovascular: Negative  Negative for chest pain, palpitations and leg swelling  Gastrointestinal: Positive for abdominal distention and nausea  Negative for abdominal pain, constipation, diarrhea and vomiting  Endocrine: Negative  Negative for polydipsia, polyphagia and polyuria  Genitourinary: Negative  Negative for difficulty urinating, frequency and urgency  Musculoskeletal: Negative  Negative for back pain and joint swelling  Skin: Negative  Negative for rash and wound  Allergic/Immunologic: Negative  Neurological: Negative  Negative for dizziness, weakness and headaches  Hematological: Negative  Negative for adenopathy  Does not bruise/bleed easily  Psychiatric/Behavioral: Negative  Negative for behavioral problems and sleep disturbance  The patient is not nervous/anxious  Objective:      BP (!) 178/100   Pulse 96   Temp 99 °F (37 2 °C)   Ht 5' 8" (1 727 m)   Wt 83 kg (183 lb)   SpO2 98%   BMI 27 83 kg/m²          Physical Exam   Constitutional: He is oriented to person, place, and time  He appears well-developed and well-nourished  HENT:   Head: Normocephalic  Eyes: Conjunctivae and EOM are normal  Pupils are equal, round, and reactive to light     Neck: Normal range of motion  Neck supple  Cardiovascular: Normal rate and regular rhythm  Pulmonary/Chest: Effort normal and breath sounds normal    Abdominal: Soft  Bowel sounds are normal    Musculoskeletal: Normal range of motion  Neurological: He is alert and oriented to person, place, and time  Skin: Skin is warm and dry  Psychiatric: He has a normal mood and affect   His behavior is normal  Judgment and thought content normal

## 2018-06-22 ENCOUNTER — APPOINTMENT (OUTPATIENT)
Dept: LAB | Facility: CLINIC | Age: 58
End: 2018-06-22
Payer: COMMERCIAL

## 2018-06-22 DIAGNOSIS — E11.42 TYPE 2 DIABETES MELLITUS WITH DIABETIC POLYNEUROPATHY, WITHOUT LONG-TERM CURRENT USE OF INSULIN (HCC): ICD-10-CM

## 2018-06-22 LAB — GLUCOSE P FAST SERPL-MCNC: 180 MG/DL (ref 65–99)

## 2018-06-22 PROCEDURE — 82947 ASSAY GLUCOSE BLOOD QUANT: CPT

## 2018-06-22 PROCEDURE — 36415 COLL VENOUS BLD VENIPUNCTURE: CPT

## 2018-06-26 ENCOUNTER — OFFICE VISIT (OUTPATIENT)
Dept: FAMILY MEDICINE CLINIC | Facility: CLINIC | Age: 58
End: 2018-06-26
Payer: COMMERCIAL

## 2018-06-26 VITALS
WEIGHT: 182 LBS | HEIGHT: 68 IN | DIASTOLIC BLOOD PRESSURE: 86 MMHG | BODY MASS INDEX: 27.58 KG/M2 | TEMPERATURE: 97.1 F | SYSTOLIC BLOOD PRESSURE: 130 MMHG

## 2018-06-26 DIAGNOSIS — E11.42 TYPE 2 DIABETES MELLITUS WITH DIABETIC POLYNEUROPATHY, WITHOUT LONG-TERM CURRENT USE OF INSULIN (HCC): Primary | ICD-10-CM

## 2018-06-26 DIAGNOSIS — R11.2 NAUSEA AND VOMITING, INTRACTABILITY OF VOMITING NOT SPECIFIED, UNSPECIFIED VOMITING TYPE: ICD-10-CM

## 2018-06-26 DIAGNOSIS — E11.42 TYPE 2 DIABETES MELLITUS WITH DIABETIC POLYNEUROPATHY, WITHOUT LONG-TERM CURRENT USE OF INSULIN (HCC): ICD-10-CM

## 2018-06-26 DIAGNOSIS — I10 ESSENTIAL HYPERTENSION: ICD-10-CM

## 2018-06-26 LAB — SL AMB POCT HGB: 7.9

## 2018-06-26 PROCEDURE — 85018 HEMOGLOBIN: CPT | Performed by: NURSE PRACTITIONER

## 2018-06-26 PROCEDURE — 99214 OFFICE O/P EST MOD 30 MIN: CPT | Performed by: NURSE PRACTITIONER

## 2018-06-26 RX ORDER — OMEPRAZOLE 20 MG/1
20 CAPSULE, DELAYED RELEASE ORAL DAILY
Qty: 30 CAPSULE | Refills: 5 | Status: SHIPPED | OUTPATIENT
Start: 2018-06-26 | End: 2019-02-19 | Stop reason: SDUPTHER

## 2018-06-26 NOTE — PROGRESS NOTES
Assessment/Plan:    No problem-specific Assessment & Plan notes found for this encounter  Diagnoses and all orders for this visit:    Type 2 diabetes mellitus with diabetic polyneuropathy, without long-term current use of insulin ([facility])  Comments:  Pt FBS today 180 lab did not run HgbA1C  POCT Hgba1c 7 9 down from 12 6 3 mos ago  Orders:  -     POCT hemoglobin fingerstick  -     liraglutide (VICTOZA) injection; Inject 0 3 mL (1 8 mg total) under the skin daily  -     Glucose, fasting; Future  -     Hemoglobin A1C; Future    Type 2 diabetes mellitus with diabetic polyneuropathy, without long-term current use of insulin ([facility])  Comments:   pt maintaied with Glyburide, Janumet, Victoza  Repeat labs end of Sept 2018 will cont to follow  Orders:  -     POCT hemoglobin fingerstick  -     liraglutide (VICTOZA) injection; Inject 0 3 mL (1 8 mg total) under the skin daily  -     Glucose, fasting; Future  -     Hemoglobin A1C; Future    Essential hypertension  Comments:  bp 130/86 will continue Zestoretic 30mg daily    Nausea and vomiting, intractability of vomiting not specified, unspecified vomiting type  Comments:  Pt reports occasional episodes of nausea/vomiting when he takes his medications early am after night shift  will add Omeprazole and evaluate  Orders:  -     omeprazole (PriLOSEC) 20 mg delayed release capsule; Take 1 capsule (20 mg total) by mouth daily          Subjective:      Patient ID: River Clayton is a 59 y o  male for diabetic management f/u lab review Pt reports 2-3 episodes of nausea/vomiting after taking his Dm, antihypertensive and statin at the same time in early am after completing night shift  Pt has tried and failed multiple DM agents including Metformin and Invokana    Could not afford Januvia alone  We both discussed working with the agents he is on in an effort to get his Hgba1c down to 7    Pt offers no complaints today      Diabetes Mellitus  Patient presents for follow up of diabetes  Current symptoms include: nausea after taking his DM pills and Lisinopril  Symptoms continue despite medication change pts  via labwork  Pt denies any other symptoms and reports he does check his sugars at home via fingerstick  Patient denies any symptoms other then nausea which her relates to working night shift and the erratic nature in which he eats and sleeps  Pt reports no , paresthesia of the feet and   Evaluation to date has included: fasting blood sugar, hemoglobin A1C and   Home sugars: BGs are high in the morning  122-180  Current treatment: Glyburide, Janumet and Victoza daily  Last dilated eye exam: several years ago pt will have his eye exam in Sept 2018 when he has time off from work  The following portions of the patient's history were reviewed and updated as appropriate:   He  has a past medical history of Diabetes mellitus ([facility] ) and Hypertension  He   Patient Active Problem List    Diagnosis Date Noted    Fatigue due to exposure 04/14/2017    Essential hypertension 04/13/2017    Hyperlipidemia associated with type 2 diabetes mellitus ([facility] ) 04/13/2017    Methadone maintenance therapy patient ([place] [address] ) 04/13/2017    Type 2 diabetes mellitus with diabetic polyneuropathy ([facility] ) 04/13/2017    Chronic hepatitis C without hepatic coma ([place] [address] ) 02/27/2017     He  has no past surgical history on file  His family history includes Diabetes in his mother; Hypertension in his father  He  reports that he has quit smoking  He has never used smokeless tobacco  He reports that he does not drink alcohol or use drugs    Current Outpatient Prescriptions   Medication Sig Dispense Refill    glyBURIDE (DIABETA) 5 mg tablet Take 1 tablet (5 mg total) by mouth 2 (two) times a day with meals 60 tablet 0    liraglutide (VICTOZA) injection Inject 0 3 mL (1 8 mg total) under the skin daily 30 pen 0    lisinopril (ZESTRIL) 30 mg tablet Take 1 tablet (30 mg total) by mouth daily 90 tablet 3  methadone (DOLOPHINE) 10 MG/5ML solution Take 70 mg by mouth      pravastatin (PRAVACHOL) 20 mg tablet Take 1 tablet (20 mg total) by mouth daily 30 tablet 3    sitaGLIPtin-metFORMIN (JANUMET)  MG per tablet Take 1 tablet by mouth 2 (two) times a day with meals 180 tablet 0    omeprazole (PriLOSEC) 20 mg delayed release capsule Take 1 capsule (20 mg total) by mouth daily 30 capsule 5     No current facility-administered medications for this visit  Current Outpatient Prescriptions on File Prior to Visit   Medication Sig    glyBURIDE (DIABETA) 5 mg tablet Take 1 tablet (5 mg total) by mouth 2 (two) times a day with meals    lisinopril (ZESTRIL) 30 mg tablet Take 1 tablet (30 mg total) by mouth daily    methadone (DOLOPHINE) 10 MG/5ML solution Take 70 mg by mouth    pravastatin (PRAVACHOL) 20 mg tablet Take 1 tablet (20 mg total) by mouth daily    sitaGLIPtin-metFORMIN (JANUMET)  MG per tablet Take 1 tablet by mouth 2 (two) times a day with meals    [DISCONTINUED] Liraglutide 18 MG/3ML SOPN Inject 0 3 mL under the skin daily     No current facility-administered medications on file prior to visit  He has No Known Allergies       Review of Systems   Constitutional: Negative  Negative for appetite change and fatigue  HENT: Negative  Negative for congestion, ear pain, rhinorrhea and sinus pain  Eyes: Negative  Negative for pain, itching and visual disturbance  Respiratory: Negative  Negative for cough, shortness of breath and wheezing  Cardiovascular: Negative  Negative for chest pain, palpitations and leg swelling  Gastrointestinal: Negative  Negative for abdominal pain, constipation, diarrhea, nausea and vomiting  Endocrine: Negative  Negative for polydipsia, polyphagia and polyuria  Genitourinary: Negative  Negative for difficulty urinating, frequency and urgency  Musculoskeletal: Negative  Negative for back pain and joint swelling  Skin: Negative  Negative for rash and wound  Allergic/Immunologic: Negative  Neurological: Negative  Negative for dizziness, weakness and headaches  Tingling in both feet   Hematological: Negative  Negative for adenopathy  Does not bruise/bleed easily  Psychiatric/Behavioral: Negative  Negative for behavioral problems and sleep disturbance  The patient is not nervous/anxious  Objective:      /86   Temp (!) 97 1 °F (36 2 °C)   Ht 5' 8" (1 727 m)   Wt 82 6 kg (182 lb)   BMI 27 67 kg/m²          Physical Exam   Constitutional: He is oriented to person, place, and time  He appears well-developed and well-nourished  HENT:   Head: Normocephalic  Eyes: Conjunctivae and EOM are normal  Pupils are equal, round, and reactive to light  Neck: Normal range of motion  Neck supple  Cardiovascular: Normal rate and regular rhythm  Pulmonary/Chest: Effort normal and breath sounds normal    Abdominal: Soft  Bowel sounds are normal    Musculoskeletal: Normal range of motion  Neurological: He is alert and oriented to person, place, and time  No sensory deficit  Skin: Skin is warm and dry  Psychiatric: He has a normal mood and affect   His behavior is normal  Judgment and thought content normal

## 2018-09-09 DIAGNOSIS — E78.5 HYPERLIPIDEMIA ASSOCIATED WITH TYPE 2 DIABETES MELLITUS (HCC): ICD-10-CM

## 2018-09-09 DIAGNOSIS — E11.69 HYPERLIPIDEMIA ASSOCIATED WITH TYPE 2 DIABETES MELLITUS (HCC): ICD-10-CM

## 2018-09-09 RX ORDER — PRAVASTATIN SODIUM 20 MG
TABLET ORAL
Qty: 30 TABLET | Refills: 3 | Status: SHIPPED | OUTPATIENT
Start: 2018-09-09 | End: 2018-11-07 | Stop reason: SDUPTHER

## 2018-09-17 LAB
LEFT EYE DIABETIC RETINOPATHY: NORMAL
RIGHT EYE DIABETIC RETINOPATHY: NORMAL

## 2018-09-17 PROCEDURE — 3072F LOW RISK FOR RETINOPATHY: CPT | Performed by: NURSE PRACTITIONER

## 2018-09-24 ENCOUNTER — APPOINTMENT (OUTPATIENT)
Dept: LAB | Facility: CLINIC | Age: 58
End: 2018-09-24
Payer: COMMERCIAL

## 2018-09-24 DIAGNOSIS — E11.42 TYPE 2 DIABETES MELLITUS WITH DIABETIC POLYNEUROPATHY, WITHOUT LONG-TERM CURRENT USE OF INSULIN (HCC): ICD-10-CM

## 2018-09-24 LAB
EST. AVERAGE GLUCOSE BLD GHB EST-MCNC: 252 MG/DL
GLUCOSE P FAST SERPL-MCNC: 351 MG/DL (ref 65–99)
HBA1C MFR BLD: 10.4 % (ref 4.2–6.3)

## 2018-09-24 PROCEDURE — 84681 ASSAY OF C-PEPTIDE: CPT

## 2018-09-24 PROCEDURE — 82947 ASSAY GLUCOSE BLOOD QUANT: CPT

## 2018-09-24 PROCEDURE — 36415 COLL VENOUS BLD VENIPUNCTURE: CPT

## 2018-09-24 PROCEDURE — 83036 HEMOGLOBIN GLYCOSYLATED A1C: CPT

## 2018-09-25 LAB — C PEPTIDE SERPL-MCNC: 4.4 NG/ML (ref 1.1–4.4)

## 2018-09-27 ENCOUNTER — OFFICE VISIT (OUTPATIENT)
Dept: FAMILY MEDICINE CLINIC | Facility: CLINIC | Age: 58
End: 2018-09-27
Payer: COMMERCIAL

## 2018-09-27 VITALS
SYSTOLIC BLOOD PRESSURE: 124 MMHG | OXYGEN SATURATION: 98 % | DIASTOLIC BLOOD PRESSURE: 78 MMHG | WEIGHT: 184 LBS | BODY MASS INDEX: 27.89 KG/M2 | HEART RATE: 95 BPM | HEIGHT: 68 IN

## 2018-09-27 DIAGNOSIS — R11.2 NON-INTRACTABLE VOMITING WITH NAUSEA, UNSPECIFIED VOMITING TYPE: ICD-10-CM

## 2018-09-27 DIAGNOSIS — I10 ESSENTIAL HYPERTENSION: Primary | ICD-10-CM

## 2018-09-27 DIAGNOSIS — E11.42 TYPE 2 DIABETES MELLITUS WITH DIABETIC POLYNEUROPATHY, WITHOUT LONG-TERM CURRENT USE OF INSULIN (HCC): ICD-10-CM

## 2018-09-27 DIAGNOSIS — J01.10 ACUTE NON-RECURRENT FRONTAL SINUSITIS: ICD-10-CM

## 2018-09-27 PROCEDURE — 3078F DIAST BP <80 MM HG: CPT | Performed by: NURSE PRACTITIONER

## 2018-09-27 PROCEDURE — 99214 OFFICE O/P EST MOD 30 MIN: CPT | Performed by: NURSE PRACTITIONER

## 2018-09-27 PROCEDURE — 3074F SYST BP LT 130 MM HG: CPT | Performed by: NURSE PRACTITIONER

## 2018-09-27 PROCEDURE — 3008F BODY MASS INDEX DOCD: CPT | Performed by: NURSE PRACTITIONER

## 2018-09-27 RX ORDER — AMOXICILLIN AND CLAVULANATE POTASSIUM 875; 125 MG/1; MG/1
1 TABLET, FILM COATED ORAL EVERY 12 HOURS SCHEDULED
Qty: 14 TABLET | Refills: 0 | Status: SHIPPED | OUTPATIENT
Start: 2018-09-27 | End: 2018-10-04

## 2018-09-27 NOTE — PROGRESS NOTES
Assessment/Plan:    No problem-specific Assessment & Plan notes found for this encounter  Diagnoses and all orders for this visit:    Essential hypertension  Comments:  bp 124/78 maintained on Lisinopril    Type 2 diabetes mellitus with diabetic polyneuropathy, without long-term current use of insulin ([facility])  Comments:  Glucose 351 HgbA1C 10 4 big increase from last visit, however pt has been   Orders:  -     Cancel: Microalbumin / creatinine urine ratio  -     Comprehensive metabolic panel    Acute non-recurrent frontal sinusitis  Comments:  RX for Augmentin provided  No work for 3 days Please drink plenty of water  Orders:  -     amoxicillin-clavulanate (AUGMENTIN) 875-125 mg per tablet; Take 1 tablet by mouth every 12 (twelve) hours for 7 days    Non-intractable vomiting with nausea, unspecified vomiting type  Comments:  Ct abd w and w/o contrast ordered  Orders:  -     CT abdomen w wo contrast; Future    Other orders  -     Cancel: Ambulatory referral to Optometry; Future          Subjective:      Patient ID: Victorino Rhoades is a 59 y o  male here for DM review   Glucose 351, HgbA1C 10 4  Pt reports he continues with "not feeling well" nausea, occasional vomiting weight is stable, is not taking his Victoza or his DM medication on regular basis and is still working night shift  Pt may have gastroparesis vs true adverse reaction to his diabetic medications  Its difficult to determine given that pt has chronic Hep c and Methadone therapy in conjunction with his DM  Pt also c/o sinus pressure, general malaise, post nasal drip  Diabetes   He presents for his follow-up diabetic visit  He has type 2 diabetes mellitus  No MedicAlert identification noted  His disease course has been worsening  Pertinent negatives for hypoglycemia include no dizziness, headaches or nervousness/anxiousness   Pertinent negatives for diabetes include no chest pain, no fatigue, no polydipsia, no polyphagia, no polyuria and no weakness  There are no diabetic complications  Risk factors for coronary artery disease include diabetes mellitus, dyslipidemia, family history, hypertension, male sex, obesity and stress  Current diabetic treatment includes diet and oral agent (dual therapy)  He is compliant with treatment some of the time  He is following a diabetic diet  Meal planning includes avoidance of concentrated sweets  He has not had a previous visit with a dietitian  He participates in exercise daily  His home blood glucose trend is increasing rapidly  An ACE inhibitor/angiotensin II receptor blocker is being taken  He does not see a podiatrist Eye exam is current  The following portions of the patient's history were reviewed and updated as appropriate:   He  has a past medical history of Diabetes mellitus ([place] [address] ) and Hypertension  He   Patient Active Problem List    Diagnosis Date Noted    Fatigue due to exposure 04/14/2017    Essential hypertension 04/13/2017    Hyperlipidemia associated with type 2 diabetes mellitus ([place] [address] ) 04/13/2017    Methadone maintenance therapy patient ([place] [address] ) 04/13/2017    Type 2 diabetes mellitus with diabetic polyneuropathy ([place] [address] ) 04/13/2017    Chronic hepatitis C without hepatic coma ([place] [address] ) 02/27/2017     He  has no past surgical history on file  His family history includes Diabetes in his mother; Hypertension in his father  He  reports that he has quit smoking  He has never used smokeless tobacco  He reports that he does not drink alcohol or use drugs    Current Outpatient Prescriptions   Medication Sig Dispense Refill    amoxicillin-clavulanate (AUGMENTIN) 875-125 mg per tablet Take 1 tablet by mouth every 12 (twelve) hours for 7 days 14 tablet 0    glyBURIDE (DIABETA) 5 mg tablet Take 1 tablet (5 mg total) by mouth 2 (two) times a day with meals 60 tablet 0    liraglutide (VICTOZA) injection Inject 0 3 mL (1 8 mg total) under the skin daily 30 pen 0    lisinopril (ZESTRIL) 30 mg tablet Take 1 tablet (30 mg total) by mouth daily 90 tablet 3    methadone (DOLOPHINE) 10 MG/5ML solution Take 70 mg by mouth      omeprazole (PriLOSEC) 20 mg delayed release capsule Take 1 capsule (20 mg total) by mouth daily 30 capsule 5    pravastatin (PRAVACHOL) 20 mg tablet TAKE 1 TABLET BY MOUTH DAILY 30 tablet 3    sitaGLIPtin-metFORMIN (JANUMET)  MG per tablet Take 1 tablet by mouth 2 (two) times a day with meals 180 tablet 0     No current facility-administered medications for this visit  Current Outpatient Prescriptions on File Prior to Visit   Medication Sig    glyBURIDE (DIABETA) 5 mg tablet Take 1 tablet (5 mg total) by mouth 2 (two) times a day with meals    liraglutide (VICTOZA) injection Inject 0 3 mL (1 8 mg total) under the skin daily    lisinopril (ZESTRIL) 30 mg tablet Take 1 tablet (30 mg total) by mouth daily    methadone (DOLOPHINE) 10 MG/5ML solution Take 70 mg by mouth    omeprazole (PriLOSEC) 20 mg delayed release capsule Take 1 capsule (20 mg total) by mouth daily    pravastatin (PRAVACHOL) 20 mg tablet TAKE 1 TABLET BY MOUTH DAILY    sitaGLIPtin-metFORMIN (JANUMET)  MG per tablet Take 1 tablet by mouth 2 (two) times a day with meals     No current facility-administered medications on file prior to visit  He has No Known Allergies       Review of Systems   Constitutional: Negative  Negative for appetite change and fatigue  HENT: Negative  Negative for congestion, ear pain, rhinorrhea and sinus pain  Eyes: Negative  Negative for pain, itching and visual disturbance  Respiratory: Negative  Negative for cough, shortness of breath and wheezing  Cardiovascular: Negative  Negative for chest pain, palpitations and leg swelling  Gastrointestinal: Negative  Negative for abdominal pain, constipation, diarrhea, nausea and vomiting  Endocrine: Negative  Negative for polydipsia, polyphagia and polyuria  Genitourinary: Negative    Negative for difficulty urinating, frequency and urgency  Musculoskeletal: Negative  Negative for back pain and joint swelling  Skin: Negative  Negative for rash and wound  Allergic/Immunologic: Negative  Neurological: Negative  Negative for dizziness, weakness and headaches  Hematological: Negative  Negative for adenopathy  Does not bruise/bleed easily  Psychiatric/Behavioral: Negative  Negative for behavioral problems and sleep disturbance  The patient is not nervous/anxious  Objective:      /78 (BP Location: Right arm, Patient Position: Sitting, Cuff Size: Adult)   Pulse 95   Ht 5' 8" (1 727 m)   Wt 83 5 kg (184 lb)   SpO2 98%   BMI 27 98 kg/m²          Physical Exam   Constitutional: He is oriented to person, place, and time  He appears well-developed and well-nourished  HENT:   Head: Normocephalic  Eyes: Pupils are equal, round, and reactive to light  Conjunctivae and EOM are normal    Neck: Normal range of motion  Neck supple  Cardiovascular: Normal rate and regular rhythm  Pulmonary/Chest: Effort normal and breath sounds normal    Abdominal: Soft  Bowel sounds are normal    Musculoskeletal: Normal range of motion  Neurological: He is alert and oriented to person, place, and time  Skin: Skin is warm and dry  Psychiatric: He has a normal mood and affect   His behavior is normal  Judgment and thought content normal

## 2018-09-27 NOTE — LETTER
September 27, 2018     Patient: Sharif Barillas   YOB: 1960   Date of Visit: 9/27/2018       To Whom it May Concern:    Sharif Barillas is under my professional care  He was seen in my office on 9/27/2018  He may return to work on October 3, 2018  Pt is suffering from acute illness and diabetic complications requiring further medical follow up    If you have any questions or concerns, please don't hesitate to call           Sincerely,          VERONICA Connell        CC: No Recipients

## 2018-09-29 ENCOUNTER — TRANSCRIBE ORDERS (OUTPATIENT)
Dept: ADMINISTRATIVE | Facility: HOSPITAL | Age: 58
End: 2018-09-29

## 2018-09-29 ENCOUNTER — APPOINTMENT (OUTPATIENT)
Dept: LAB | Facility: HOSPITAL | Age: 58
End: 2018-09-29
Payer: COMMERCIAL

## 2018-09-29 DIAGNOSIS — E11.42 DIABETIC POLYNEUROPATHY ASSOCIATED WITH TYPE 2 DIABETES MELLITUS (HCC): Primary | ICD-10-CM

## 2018-09-29 DIAGNOSIS — E11.42 DIABETIC POLYNEUROPATHY ASSOCIATED WITH TYPE 2 DIABETES MELLITUS (HCC): ICD-10-CM

## 2018-09-29 LAB
ALBUMIN SERPL BCP-MCNC: 3.5 G/DL (ref 3.5–5.7)
ALP SERPL-CCNC: 90 U/L (ref 55–165)
ALT SERPL W P-5'-P-CCNC: 24 U/L (ref 7–52)
ANION GAP SERPL CALCULATED.3IONS-SCNC: 8 MMOL/L (ref 4–13)
AST SERPL W P-5'-P-CCNC: 19 U/L (ref 13–39)
BILIRUB SERPL-MCNC: 1 MG/DL (ref 0.2–1)
BUN SERPL-MCNC: 20 MG/DL (ref 7–25)
CALCIUM SERPL-MCNC: 9.9 MG/DL (ref 8.6–10.5)
CHLORIDE SERPL-SCNC: 95 MMOL/L (ref 98–107)
CO2 SERPL-SCNC: 29 MMOL/L (ref 21–31)
CREAT SERPL-MCNC: 1.05 MG/DL (ref 0.7–1.3)
GFR SERPL CREATININE-BSD FRML MDRD: 77 ML/MIN/1.73SQ M
GLUCOSE P FAST SERPL-MCNC: 442 MG/DL (ref 65–99)
POTASSIUM SERPL-SCNC: 4.8 MMOL/L (ref 3.5–5.5)
PROT SERPL-MCNC: 8.2 G/DL (ref 6.4–8.9)
SODIUM SERPL-SCNC: 132 MMOL/L (ref 134–143)

## 2018-09-29 PROCEDURE — 36415 COLL VENOUS BLD VENIPUNCTURE: CPT

## 2018-09-29 PROCEDURE — 80053 COMPREHEN METABOLIC PANEL: CPT

## 2018-10-01 ENCOUNTER — HOSPITAL ENCOUNTER (OUTPATIENT)
Dept: CT IMAGING | Facility: HOSPITAL | Age: 58
Discharge: HOME | End: 2018-10-01
Payer: COMMERCIAL

## 2018-10-01 DIAGNOSIS — R11.2 NON-INTRACTABLE VOMITING WITH NAUSEA, UNSPECIFIED VOMITING TYPE: ICD-10-CM

## 2018-10-01 DIAGNOSIS — E11.9 TYPE 2 DIABETES MELLITUS WITHOUT COMPLICATION, WITHOUT LONG-TERM CURRENT USE OF INSULIN (HCC): Primary | ICD-10-CM

## 2018-10-01 PROCEDURE — 74170 CT ABD WO CNTRST FLWD CNTRST: CPT

## 2018-10-01 RX ORDER — INSULIN GLARGINE 100 [IU]/ML
20 INJECTION, SOLUTION SUBCUTANEOUS DAILY
Qty: 10 ML | Refills: 3 | Status: SHIPPED | OUTPATIENT
Start: 2018-10-01 | End: 2018-10-11 | Stop reason: ALTCHOICE

## 2018-10-01 RX ADMIN — IOHEXOL 80 ML: 350 INJECTION, SOLUTION INTRAVENOUS at 15:34

## 2018-10-04 ENCOUNTER — OFFICE VISIT (OUTPATIENT)
Dept: FAMILY MEDICINE CLINIC | Facility: CLINIC | Age: 58
End: 2018-10-04
Payer: COMMERCIAL

## 2018-10-04 VITALS
SYSTOLIC BLOOD PRESSURE: 128 MMHG | HEIGHT: 68 IN | OXYGEN SATURATION: 98 % | WEIGHT: 180.2 LBS | HEART RATE: 97 BPM | DIASTOLIC BLOOD PRESSURE: 82 MMHG | BODY MASS INDEX: 27.31 KG/M2

## 2018-10-04 DIAGNOSIS — R11.15 NON-INTRACTABLE CYCLICAL VOMITING WITH NAUSEA: ICD-10-CM

## 2018-10-04 DIAGNOSIS — E11.42 TYPE 2 DIABETES MELLITUS WITH DIABETIC POLYNEUROPATHY, WITHOUT LONG-TERM CURRENT USE OF INSULIN (HCC): Primary | ICD-10-CM

## 2018-10-04 LAB — SL AMB POCT GLUCOSE BLD: ABNORMAL

## 2018-10-04 PROCEDURE — 82948 REAGENT STRIP/BLOOD GLUCOSE: CPT | Performed by: NURSE PRACTITIONER

## 2018-10-04 PROCEDURE — 99214 OFFICE O/P EST MOD 30 MIN: CPT | Performed by: NURSE PRACTITIONER

## 2018-10-04 RX ORDER — REPAGLINIDE 0.5 MG/1
0.5 TABLET ORAL
Qty: 90 TABLET | Refills: 3 | Status: SHIPPED | OUTPATIENT
Start: 2018-10-04 | End: 2018-10-11 | Stop reason: ALTCHOICE

## 2018-10-04 NOTE — LETTER
October 4, 2018     Patient: Francine Shukla   YOB: 1960   Date of Visit: 10/4/2018       To Whom it May Concern:    Francine Shukla is under my professional care  He was seen in my office on 10/4/2018  He may return to work on October 3- 5 due to acute illness related to diabetes  If you have any questions or concerns, please don't hesitate to call           Sincerely,          VERONICA Powell        CC: No Recipients

## 2018-10-04 NOTE — PROGRESS NOTES
Assessment/Plan:    No problem-specific Assessment & Plan notes found for this encounter  There are no diagnoses linked to this encounter  Subjective:      Patient ID: Silvia Anand is a 59 y o  male  Pt is here today for DM medication adjustment  Pt reports he does not think he can tolerate the Victoza, has not been taking any of his DM medication correctly, sugars are 400-500/ Today POCT high  Pt only took Glyburide, reports he did not know that there was Insulin to  at the pharmacy I had ordered Lantus Solostar 20 IU daily which he has not started  Pt reports nausea and cyclical vomiting which does not occur everyday but pt relates it to administration of Victoza  Pt reports  are subsiding since he started the Augmentin prescribed (only started his yesterday)  Pt has been off work as ordered and thinks it is helping him to recover as he works night shift  I wrote down new schedule of medications for pt as of today  He should take Lantus daily, RePaglinide 3X daily before meals and Glyburide bid  I will call the patient tomorrow to assess his sugars with new medication regimen and he will f/u in the office in 1 week  Pt did have CT abd completed to r/o pancreatitis and it was negative  Pt has complex medical history to include Hepatitis C and daily use of Suboxone  The following portions of the patient's history were reviewed and updated as appropriate:   He  has a past medical history of Diabetes mellitus ([facility] ) and Hypertension  He   Patient Active Problem List    Diagnosis Date Noted    Fatigue due to exposure 04/14/2017    Essential hypertension 04/13/2017    Hyperlipidemia associated with type 2 diabetes mellitus ([facility] ) 04/13/2017    Methadone maintenance therapy patient ([facility] ) 04/13/2017    Type 2 diabetes mellitus with diabetic polyneuropathy ([facility] ) 04/13/2017    Chronic hepatitis C without hepatic coma ([facility] ) 02/27/2017     He  has no past surgical history on file    His family history includes Diabetes in his mother; Hypertension in his father  He  reports that he has quit smoking  He has never used smokeless tobacco  He reports that he does not drink alcohol or use drugs  Current Outpatient Prescriptions   Medication Sig Dispense Refill    amoxicillin-clavulanate (AUGMENTIN) 875-125 mg per tablet Take 1 tablet by mouth every 12 (twelve) hours for 7 days 14 tablet 0    glyBURIDE (DIABETA) 5 mg tablet Take 1 tablet (5 mg total) by mouth 2 (two) times a day with meals 60 tablet 0    insulin glargine (LANTUS) 100 units/mL subcutaneous injection Inject 20 Units under the skin daily 10 mL 3    Insulin Pen Needle 31G X 5 MM MISC by Does not apply route daily 100 each 3    liraglutide (VICTOZA) injection Inject 0 3 mL (1 8 mg total) under the skin daily 30 pen 0    lisinopril (ZESTRIL) 30 mg tablet Take 1 tablet (30 mg total) by mouth daily 90 tablet 3    methadone (DOLOPHINE) 10 MG/5ML solution Take 70 mg by mouth      omeprazole (PriLOSEC) 20 mg delayed release capsule Take 1 capsule (20 mg total) by mouth daily 30 capsule 5    pravastatin (PRAVACHOL) 20 mg tablet TAKE 1 TABLET BY MOUTH DAILY 30 tablet 3     No current facility-administered medications for this visit        Current Outpatient Prescriptions on File Prior to Visit   Medication Sig    amoxicillin-clavulanate (AUGMENTIN) 875-125 mg per tablet Take 1 tablet by mouth every 12 (twelve) hours for 7 days    glyBURIDE (DIABETA) 5 mg tablet Take 1 tablet (5 mg total) by mouth 2 (two) times a day with meals    insulin glargine (LANTUS) 100 units/mL subcutaneous injection Inject 20 Units under the skin daily    Insulin Pen Needle 31G X 5 MM MISC by Does not apply route daily    liraglutide (VICTOZA) injection Inject 0 3 mL (1 8 mg total) under the skin daily    lisinopril (ZESTRIL) 30 mg tablet Take 1 tablet (30 mg total) by mouth daily    methadone (DOLOPHINE) 10 MG/5ML solution Take 70 mg by mouth    omeprazole (PriLOSEC) 20 mg delayed release capsule Take 1 capsule (20 mg total) by mouth daily    pravastatin (PRAVACHOL) 20 mg tablet TAKE 1 TABLET BY MOUTH DAILY     No current facility-administered medications on file prior to visit  He has No Known Allergies       Review of Systems   Constitutional: Negative  Negative for appetite change and fatigue  HENT: Negative  Negative for congestion, ear pain, rhinorrhea and sinus pain  Eyes: Negative  Negative for pain, itching and visual disturbance  Respiratory: Negative  Negative for cough, shortness of breath and wheezing  Cardiovascular: Negative  Negative for chest pain, palpitations and leg swelling  Gastrointestinal: Positive for nausea and vomiting  Negative for abdominal pain, constipation and diarrhea  Endocrine: Positive for polyuria  Negative for polydipsia and polyphagia  Genitourinary: Negative  Negative for difficulty urinating, frequency and urgency  Musculoskeletal: Negative  Negative for back pain and joint swelling  Skin: Negative  Negative for rash and wound  Allergic/Immunologic: Negative  Neurological: Negative  Negative for dizziness, weakness and headaches  Hematological: Negative  Negative for adenopathy  Does not bruise/bleed easily  Psychiatric/Behavioral: Negative  Negative for behavioral problems and sleep disturbance  The patient is not nervous/anxious  Objective:      /82 (BP Location: Left arm, Patient Position: Sitting, Cuff Size: Adult)   Pulse 97   Ht 5' 8" (1 727 m)   Wt 81 7 kg (180 lb 3 2 oz)   SpO2 98%   BMI 27 40 kg/m²          Physical Exam   Constitutional: He is oriented to person, place, and time  He appears well-developed and well-nourished  HENT:   Head: Normocephalic  Eyes: Pupils are equal, round, and reactive to light  Conjunctivae and EOM are normal    Neck: Normal range of motion  Neck supple  Cardiovascular: Normal rate and regular rhythm      Pulmonary/Chest: Effort normal and breath sounds normal    Abdominal: Soft  Bowel sounds are normal  There is no tenderness  Abdomen tympanic but not tender to touch    Musculoskeletal: Normal range of motion  Neurological: He is alert and oriented to person, place, and time  Skin: Skin is warm and dry  Psychiatric: He has a normal mood and affect   His behavior is normal  Judgment and thought content normal

## 2018-10-10 NOTE — PROGRESS NOTES
Assessment/Plan:    No problem-specific Assessment & Plan notes found for this encounter  Diagnoses and all orders for this visit:    Type 2 diabetes mellitus with diabetic polyneuropathy, without long-term current use of insulin ([facility])  Comments:  POCT 531 today pt reports he is feeling better, stop all oral DM medications, pt will start Humalog flexpen 10 IU 3 X daily in addition to Lantus 30 IIU daily  Orders:  -     Insulin Lispro (HUMALOG) 100 units/mL cartridge for injection; Inject 10 Units under the skin 3 (three) times a day with meals    Non-intractable cyclical vomiting with nausea  Comments:  Pt reports it is resolving despite elevated sugar    Type 2 diabetes mellitus without complication, without long-term current use of insulin ([facility])  Comments:  Pt will return to care in 1 week   Orders:  -     insulin glargine (LANTUS SOLOSTAR) 100 units/mL injection pen; Inject 30 Units under the skin daily  -     Insulin Pen Needle 31G X 5 MM MISC; by Does not apply route daily Pt to inject 4 X daily          Subjective:      Patient ID: Libby Gomez is a 59 y o  male here for persistent nausea and elevated blood sugar    59 yr old male here today for ongoing elevated BS, GMAI 077 today despite use of Lantus, Glyburide and Prandin  On a positive note, pt states vomiting is resolved and he only felt like he was going to vomit X1 since last visit  Pt states his appetite is returning and he is going back to work this weekend  I expressed my concern about this because pt works night shift and has erratic sleep and eating schedule which I do believe is impacting his sugar  Pt reports he is testing his BS 3X daily with  and below   Dennis Caal I did discuss the importance of eating balanced meals and trying to take his pills on a more regular schedule  I do have my concerns that pt is not taking his medication appropriately  I will send a referral to nutrition services to assess the pt for DM needs in the home  The following portions of the patient's history were reviewed and updated as appropriate:   He  has a past medical history of Diabetes mellitus ([facility] ) and Hypertension  He   Patient Active Problem List    Diagnosis Date Noted    Non-intractable cyclical vomiting with nausea 10/04/2018    Fatigue due to exposure 04/14/2017    Essential hypertension 04/13/2017    Hyperlipidemia associated with type 2 diabetes mellitus ([place] [address] ) 04/13/2017    Methadone maintenance therapy patient ([place] [address] ) 04/13/2017    Type 2 diabetes mellitus with diabetic polyneuropathy ([place] [address] ) 04/13/2017    Chronic hepatitis C without hepatic coma ([place] [address] ) 02/27/2017     He  has no past surgical history on file  His family history includes Diabetes in his mother; Hypertension in his father  He  reports that he has quit smoking  He has never used smokeless tobacco  He reports that he does not drink alcohol or use drugs  Current Outpatient Prescriptions   Medication Sig Dispense Refill    Insulin Pen Needle 31G X 5 MM MISC by Does not apply route daily Pt to inject 4 X daily 100 each 5    lisinopril (ZESTRIL) 30 mg tablet Take 1 tablet (30 mg total) by mouth daily 90 tablet 3    methadone (DOLOPHINE) 10 MG/5ML solution Take 70 mg by mouth      omeprazole (PriLOSEC) 20 mg delayed release capsule Take 1 capsule (20 mg total) by mouth daily 30 capsule 5    pravastatin (PRAVACHOL) 20 mg tablet TAKE 1 TABLET BY MOUTH DAILY 30 tablet 3    insulin glargine (LANTUS SOLOSTAR) 100 units/mL injection pen Inject 30 Units under the skin daily 5 pen 0    Insulin Lispro (HUMALOG) 100 units/mL cartridge for injection Inject 10 Units under the skin 3 (three) times a day with meals 5 Cartridge 2     No current facility-administered medications for this visit        Current Outpatient Prescriptions on File Prior to Visit   Medication Sig    lisinopril (ZESTRIL) 30 mg tablet Take 1 tablet (30 mg total) by mouth daily    methadone (DOLOPHINE) 10 MG/5ML solution Take 70 mg by mouth    omeprazole (PriLOSEC) 20 mg delayed release capsule Take 1 capsule (20 mg total) by mouth daily    pravastatin (PRAVACHOL) 20 mg tablet TAKE 1 TABLET BY MOUTH DAILY    [DISCONTINUED] glyBURIDE (DIABETA) 5 mg tablet Take 1 tablet (5 mg total) by mouth 2 (two) times a day with meals    [DISCONTINUED] insulin glargine (LANTUS) 100 units/mL subcutaneous injection Inject 20 Units under the skin daily    [DISCONTINUED] Insulin Pen Needle 31G X 5 MM MISC by Does not apply route daily    [DISCONTINUED] repaglinide (PRANDIN) 0 5 mg tablet Take 1 tablet (0 5 mg total) by mouth 3 (three) times a day before meals     No current facility-administered medications on file prior to visit  He has No Known Allergies       Review of Systems   Constitutional: Negative  Negative for appetite change and fatigue  HENT: Negative  Negative for congestion, ear pain, rhinorrhea and sinus pain  Eyes: Negative  Negative for pain, itching and visual disturbance  Respiratory: Negative  Negative for cough, shortness of breath and wheezing  Cardiovascular: Negative  Negative for chest pain, palpitations and leg swelling  Gastrointestinal: Positive for nausea and vomiting  Negative for abdominal pain, constipation and diarrhea  Endocrine: Negative for polydipsia, polyphagia and polyuria  Genitourinary: Negative  Negative for difficulty urinating, frequency and urgency  Musculoskeletal: Negative  Negative for back pain and joint swelling  Skin: Negative  Negative for rash and wound  Allergic/Immunologic: Negative  Neurological: Negative  Negative for dizziness, weakness and headaches  Hematological: Negative  Negative for adenopathy  Does not bruise/bleed easily  Psychiatric/Behavioral: Negative  Negative for behavioral problems and sleep disturbance  The patient is not nervous/anxious            Objective:      /80 (BP Location: Left arm, Patient Position: Sitting, Cuff Size: Adult)   Pulse 58   Ht 5' 8" (1 727 m)   Wt 82 6 kg (182 lb 3 2 oz)   SpO2 98%   BMI 27 70 kg/m²          Physical Exam   Constitutional: He is oriented to person, place, and time  He appears well-developed and well-nourished  HENT:   Head: Normocephalic  Eyes: Pupils are equal, round, and reactive to light  Conjunctivae and EOM are normal    Neck: Normal range of motion  Neck supple  Cardiovascular: Normal rate and regular rhythm  Pulmonary/Chest: Effort normal and breath sounds normal    Abdominal: Soft  Bowel sounds are normal  There is no tenderness  Abdomen tympanic but not tender to touch    Musculoskeletal: Normal range of motion  Neurological: He is alert and oriented to person, place, and time  Skin: Skin is warm and dry  Psychiatric: He has a normal mood and affect   His behavior is normal  Judgment and thought content normal

## 2018-10-11 ENCOUNTER — OFFICE VISIT (OUTPATIENT)
Dept: FAMILY MEDICINE CLINIC | Facility: CLINIC | Age: 58
End: 2018-10-11
Payer: COMMERCIAL

## 2018-10-11 VITALS
SYSTOLIC BLOOD PRESSURE: 122 MMHG | HEART RATE: 58 BPM | WEIGHT: 182.2 LBS | OXYGEN SATURATION: 98 % | DIASTOLIC BLOOD PRESSURE: 80 MMHG | BODY MASS INDEX: 27.61 KG/M2 | HEIGHT: 68 IN

## 2018-10-11 DIAGNOSIS — E11.42 TYPE 2 DIABETES MELLITUS WITH DIABETIC POLYNEUROPATHY, WITHOUT LONG-TERM CURRENT USE OF INSULIN (HCC): Primary | ICD-10-CM

## 2018-10-11 DIAGNOSIS — R11.15 NON-INTRACTABLE CYCLICAL VOMITING WITH NAUSEA: ICD-10-CM

## 2018-10-11 DIAGNOSIS — E11.9 TYPE 2 DIABETES MELLITUS WITHOUT COMPLICATION, WITHOUT LONG-TERM CURRENT USE OF INSULIN (HCC): ICD-10-CM

## 2018-10-11 PROCEDURE — 99214 OFFICE O/P EST MOD 30 MIN: CPT | Performed by: NURSE PRACTITIONER

## 2018-10-25 DIAGNOSIS — E11.9 TYPE 2 DIABETES MELLITUS WITHOUT COMPLICATION, WITHOUT LONG-TERM CURRENT USE OF INSULIN (HCC): ICD-10-CM

## 2018-10-30 ENCOUNTER — OFFICE VISIT (OUTPATIENT)
Dept: FAMILY MEDICINE CLINIC | Facility: CLINIC | Age: 58
End: 2018-10-30
Payer: COMMERCIAL

## 2018-10-30 VITALS
WEIGHT: 190 LBS | HEIGHT: 68 IN | BODY MASS INDEX: 28.79 KG/M2 | HEART RATE: 90 BPM | DIASTOLIC BLOOD PRESSURE: 84 MMHG | SYSTOLIC BLOOD PRESSURE: 140 MMHG | OXYGEN SATURATION: 97 %

## 2018-10-30 DIAGNOSIS — R11.15 NON-INTRACTABLE CYCLICAL VOMITING WITH NAUSEA: ICD-10-CM

## 2018-10-30 DIAGNOSIS — Z23 INFLUENZA VACCINE NEEDED: ICD-10-CM

## 2018-10-30 DIAGNOSIS — R22.43 LOCALIZED SWELLING OF BOTH LOWER LEGS: ICD-10-CM

## 2018-10-30 DIAGNOSIS — L98.491 CALLOUS ULCER, LIMITED TO BREAKDOWN OF SKIN (HCC): ICD-10-CM

## 2018-10-30 DIAGNOSIS — E11.42 TYPE 2 DIABETES MELLITUS WITH DIABETIC POLYNEUROPATHY, WITHOUT LONG-TERM CURRENT USE OF INSULIN (HCC): ICD-10-CM

## 2018-10-30 DIAGNOSIS — E11.42 TYPE 2 DIABETES MELLITUS WITH DIABETIC POLYNEUROPATHY, WITHOUT LONG-TERM CURRENT USE OF INSULIN (HCC): Primary | ICD-10-CM

## 2018-10-30 PROCEDURE — 90686 IIV4 VACC NO PRSV 0.5 ML IM: CPT

## 2018-10-30 PROCEDURE — 82948 REAGENT STRIP/BLOOD GLUCOSE: CPT | Performed by: NURSE PRACTITIONER

## 2018-10-30 PROCEDURE — 90471 IMMUNIZATION ADMIN: CPT

## 2018-10-30 PROCEDURE — 99214 OFFICE O/P EST MOD 30 MIN: CPT | Performed by: NURSE PRACTITIONER

## 2018-10-30 RX ORDER — POTASSIUM CHLORIDE 20 MEQ/1
20 TABLET, EXTENDED RELEASE ORAL DAILY
Qty: 90 TABLET | Refills: 1 | Status: SHIPPED | OUTPATIENT
Start: 2018-10-30 | End: 2019-02-19 | Stop reason: SDUPTHER

## 2018-10-30 RX ORDER — FUROSEMIDE 20 MG/1
20 TABLET ORAL DAILY
Qty: 90 TABLET | Refills: 1 | Status: SHIPPED | OUTPATIENT
Start: 2018-10-30 | End: 2019-02-19 | Stop reason: SDUPTHER

## 2018-10-30 NOTE — PROGRESS NOTES
Assessment/Plan:    No problem-specific Assessment & Plan notes found for this encounter  Diagnoses and all orders for this visit:    Type 2 diabetes mellitus with diabetic polyneuropathy, without long-term current use of insulin (HCC)  Comments:  Pt reports home glucose -240  POCT in the office today 248  Will increase Lantus to 40IU daily and Humalog 20 IU 3 X daily  Orders:  -     POCT blood glucose    Non-intractable cyclical vomiting with nausea  Comments:  Resolved     Influenza vaccine needed  Comments:  Provided today  Orders:  -     Cancel: influenza vaccine, [id number], quadrivalent, recombinant, PF, 0 5 mL, for patients 18 yr+ (FLUBLOK)  -     SYRINGE/SINGLE-DOSE VIAL: influenza vaccine, [id number], quadrivalent, 0 5 mL, preservative-free, for patients 3+ yr (FLUZONE)    Callous ulcer, limited to breakdown of skin [facility])  Comments:  Pt referred back to his podiatrist Dr Adam Nguyen, new large callous left great toe  Orders:  -     Ambulatory referral to Podiatry; Future    Localized swelling of both lower legs  Comments:  Rx for Lasix 20mg and Kdur 20 mg provided for use prn for increased bilaterallyLE swelling  Orders:  -     furosemide (LASIX) 20 mg tablet; Take 1 tablet (20 mg total) by mouth daily  -     potassium chloride (K-DUR,KLOR-CON) 20 mEq tablet; Take 1 tablet (20 mEq total) by mouth daily          Subjective:      Patient ID: Wanda Phelan is a 59 y o  male here to evaluate effectiveness of insulin change from oral DM agents    59 yr old male arrives today for DM f/u regarding new use of insulin Humalog and Lantus  Pt reports he is feeling so much better, no more nausea and vomiting, appetite is improved and pt notes much lower blood sugars at home 200 -250  Pt only complaint today is that he has new large callous on left great toe lateral aspect  Pt does have est care with Dr Adam Nguyen of podiatry and will agree to make appt   Pt has new steel tip shoes which may be contributing to new onset of callous  Pt also reports intermittent swelling of lower extremity edema which is noteable  We discussed use of Lasix and Potassium prn when swelling is prominent  Pt verbalized understanding  Pt will RTC in 1 mos to assess effect from increase in Lantus to 40 IU daily and Humalog 20 IU 3 X daily        The following portions of the patient's history were reviewed and updated as appropriate:   He  has no past medical history on file  He   Patient Active Problem List    Diagnosis Date Noted    Callous ulcer, limited to breakdown of skin ([place] [id number] ) 10/30/2018    Non-intractable cyclical vomiting with nausea 10/04/2018    Fatigue due to exposure 04/14/2017    Essential hypertension 04/13/2017    Hyperlipidemia associated with type 2 diabetes mellitus ([place] [address] ) 04/13/2017    Methadone maintenance therapy patient ([place] [address] ) 04/13/2017    Type 2 diabetes mellitus with diabetic polyneuropathy ([place] [address] ) 04/13/2017    Chronic hepatitis C without hepatic coma ([place] [address] ) 02/27/2017     He  has no past surgical history on file  His family history includes Diabetes in his mother; Hypertension in his father  He  reports that he has quit smoking  He has never used smokeless tobacco  He reports that he does not drink alcohol or use drugs    Current Outpatient Prescriptions   Medication Sig Dispense Refill    insulin glargine (LANTUS SOLOSTAR) 100 units/mL injection pen Inject 30 Units under the skin daily 5 pen 2    Insulin Lispro (HUMALOG) 100 units/mL cartridge for injection Inject 10 Units under the skin 3 (three) times a day with meals 5 Cartridge 2    Insulin Pen Needle 31G X 5 MM MISC by Does not apply route daily Pt to inject 4 X daily 100 each 5    lisinopril (ZESTRIL) 30 mg tablet Take 1 tablet (30 mg total) by mouth daily 90 tablet 3    methadone (DOLOPHINE) 10 MG/5ML solution Take 70 mg by mouth      omeprazole (PriLOSEC) 20 mg delayed release capsule Take 1 capsule (20 mg total) by mouth daily 30 capsule 5    pravastatin (PRAVACHOL) 20 mg tablet TAKE 1 TABLET BY MOUTH DAILY 30 tablet 3    furosemide (LASIX) 20 mg tablet Take 1 tablet (20 mg total) by mouth daily 90 tablet 1    potassium chloride (K-DUR,KLOR-CON) 20 mEq tablet Take 1 tablet (20 mEq total) by mouth daily 90 tablet 1     No current facility-administered medications for this visit  Current Outpatient Prescriptions on File Prior to Visit   Medication Sig    insulin glargine (LANTUS SOLOSTAR) 100 units/mL injection pen Inject 30 Units under the skin daily    Insulin Lispro (HUMALOG) 100 units/mL cartridge for injection Inject 10 Units under the skin 3 (three) times a day with meals    Insulin Pen Needle 31G X 5 MM MISC by Does not apply route daily Pt to inject 4 X daily    lisinopril (ZESTRIL) 30 mg tablet Take 1 tablet (30 mg total) by mouth daily    methadone (DOLOPHINE) 10 MG/5ML solution Take 70 mg by mouth    omeprazole (PriLOSEC) 20 mg delayed release capsule Take 1 capsule (20 mg total) by mouth daily    pravastatin (PRAVACHOL) 20 mg tablet TAKE 1 TABLET BY MOUTH DAILY     No current facility-administered medications on file prior to visit  He has No Known Allergies       Review of Systems   Constitutional: Negative  Negative for appetite change and fatigue  HENT: Negative  Negative for congestion, ear pain, rhinorrhea and sinus pain  Eyes: Negative  Negative for pain, itching and visual disturbance  Respiratory: Negative  Negative for cough, shortness of breath and wheezing  Cardiovascular: Positive for leg swelling  Negative for chest pain and palpitations  Gastrointestinal: Negative  Negative for abdominal pain, constipation, diarrhea, nausea and vomiting  Endocrine: Negative  Negative for polydipsia, polyphagia and polyuria  Genitourinary: Negative  Negative for difficulty urinating, frequency and urgency  Musculoskeletal: Negative  Negative for back pain and joint swelling  Skin: Negative    Negative for rash and wound  New callous left great toe X 3 weeks   Allergic/Immunologic: Negative  Neurological: Negative  Negative for dizziness, weakness and headaches  Hematological: Negative  Negative for adenopathy  Does not bruise/bleed easily  Psychiatric/Behavioral: Negative  Negative for behavioral problems and sleep disturbance  The patient is not nervous/anxious  Objective:      /92 (BP Location: Left arm, Patient Position: Sitting, Cuff Size: Adult)   Pulse 90   Ht 5' 8" (1 727 m)   Wt 86 2 kg (190 lb)   SpO2 97%   BMI 28 89 kg/m²          Physical Exam   Constitutional: He is oriented to person, place, and time  He appears well-developed and well-nourished  HENT:   Head: Normocephalic  Eyes: Pupils are equal, round, and reactive to light  Conjunctivae and EOM are normal    Neck: Normal range of motion  Neck supple  Cardiovascular: Normal rate, regular rhythm and intact distal pulses  Bilateral LE edema noted +1/4   Pulmonary/Chest: Effort normal and breath sounds normal    Abdominal: Soft  Bowel sounds are normal    Musculoskeletal: Normal range of motion  Neurological: He is alert and oriented to person, place, and time  Skin: Skin is warm and dry  Large protruding painful clear callous noted lateral aspect of left great toe-pt will follow with Podiatry for removal   Psychiatric: He has a normal mood and affect   His behavior is normal  Judgment and thought content normal

## 2018-11-07 DIAGNOSIS — E78.5 HYPERLIPIDEMIA ASSOCIATED WITH TYPE 2 DIABETES MELLITUS (HCC): ICD-10-CM

## 2018-11-07 DIAGNOSIS — E11.69 HYPERLIPIDEMIA ASSOCIATED WITH TYPE 2 DIABETES MELLITUS (HCC): ICD-10-CM

## 2018-11-08 RX ORDER — PRAVASTATIN SODIUM 20 MG
20 TABLET ORAL DAILY
Qty: 90 TABLET | Refills: 1 | Status: SHIPPED | OUTPATIENT
Start: 2018-11-08 | End: 2019-05-28 | Stop reason: SDUPTHER

## 2018-11-27 ENCOUNTER — OFFICE VISIT (OUTPATIENT)
Dept: FAMILY MEDICINE CLINIC | Facility: CLINIC | Age: 58
End: 2018-11-27
Payer: COMMERCIAL

## 2018-11-27 VITALS
BODY MASS INDEX: 30.19 KG/M2 | HEIGHT: 68 IN | WEIGHT: 199.2 LBS | HEART RATE: 93 BPM | OXYGEN SATURATION: 98 % | SYSTOLIC BLOOD PRESSURE: 152 MMHG | DIASTOLIC BLOOD PRESSURE: 90 MMHG

## 2018-11-27 DIAGNOSIS — I10 ESSENTIAL HYPERTENSION: ICD-10-CM

## 2018-11-27 DIAGNOSIS — E11.42 TYPE 2 DIABETES MELLITUS WITH DIABETIC POLYNEUROPATHY, WITHOUT LONG-TERM CURRENT USE OF INSULIN (HCC): ICD-10-CM

## 2018-11-27 DIAGNOSIS — E11.42 TYPE 2 DIABETES MELLITUS WITH DIABETIC POLYNEUROPATHY, WITHOUT LONG-TERM CURRENT USE OF INSULIN (HCC): Primary | ICD-10-CM

## 2018-11-27 DIAGNOSIS — R60.0 BILATERAL LOWER EXTREMITY EDEMA: ICD-10-CM

## 2018-11-27 PROCEDURE — 99214 OFFICE O/P EST MOD 30 MIN: CPT | Performed by: NURSE PRACTITIONER

## 2018-11-27 PROCEDURE — 3008F BODY MASS INDEX DOCD: CPT | Performed by: NURSE PRACTITIONER

## 2018-11-27 PROCEDURE — 1036F TOBACCO NON-USER: CPT | Performed by: NURSE PRACTITIONER

## 2018-11-27 PROCEDURE — 4010F ACE/ARB THERAPY RXD/TAKEN: CPT | Performed by: NURSE PRACTITIONER

## 2018-11-27 RX ORDER — LISINOPRIL 40 MG/1
40 TABLET ORAL DAILY
Qty: 90 TABLET | Refills: 1 | Status: SHIPPED | OUTPATIENT
Start: 2018-11-27 | End: 2019-01-03 | Stop reason: SDUPTHER

## 2018-11-27 RX ORDER — INSULIN GLARGINE 100 [IU]/ML
INJECTION, SOLUTION SUBCUTANEOUS
COMMUNITY
Start: 2018-11-24 | End: 2019-02-19 | Stop reason: ALTCHOICE

## 2018-11-27 NOTE — PROGRESS NOTES
Assessment/Plan:    No problem-specific Assessment & Plan notes found for this encounter  Diagnoses and all orders for this visit:    Type 2 diabetes mellitus with diabetic polyneuropathy, without long-term current use of insulin ([facility])  Comments:  pt self glucose this am fasting 133 will continue current regimen of Lantus and Humalog insulin, labwork 1 mos  Orders:  -     Microalbumin / creatinine urine ratio  -     Hemoglobin A1C; Future  -     Glucose, fasting; Future  -     lisinopril (ZESTRIL) 40 mg tablet; Take 1 tablet (40 mg total) by mouth daily    Essential hypertension  Comments:  bp 148/92 Lisinopril increased to 40mg daily  Orders:  -     Microalbumin / creatinine urine ratio  -     Hemoglobin A1C; Future  -     Glucose, fasting; Future    Type 2 diabetes mellitus with diabetic polyneuropathy, without long-term current use of insulin ([facility])  Comments:  Pt did not f/u with podiatry but reports no further worsening of numbness and tingling of lower extremities  Orders:  -     Microalbumin / creatinine urine ratio  -     Hemoglobin A1C; Future  -     Glucose, fasting; Future  -     lisinopril (ZESTRIL) 40 mg tablet; Take 1 tablet (40 mg total) by mouth daily    Bilateral lower extremity edema  Comments:  pt instructed to please  his Lasix and Potassium    Other orders  -     LANTUS 100 UNIT/ML subcutaneous injection;           Subjective:      Patient ID: Marcos Francois is a 59 y o  male  Subjective    Marcos Francois is an 59 y o  male who presents for follow up of diabetes  Current symptoms include: hyperglycemia , nausea and and lower extremity fluid retention  Pt did not  Furosemide or Potassium  Patient denies foot ulcerations, hypoglycemia  and weight loss  Evaluation to date has included: fasting blood sugar, fasting lipid panel and hemoglobin A1C  Home sugars: BGs have been labile ranging between 133 to 180    Current treatments: Started insulin which has been effective   Last dilated eye exam 3 months ago       Of note pt is c/o bilateral lower extremity swelling with "tightness of his legs"  Pt reports he did not  the Lasix or Potassium  I advised him to please start the Lasix as needed for 3 lb weight gain          The following portions of the patient's history were reviewed and updated as appropriate:   He  has no past medical history on file  He   Patient Active Problem List    Diagnosis Date Noted    Bilateral lower extremity edema 11/27/2018    Callous ulcer, limited to breakdown of skin ([place] [id number] ) 10/30/2018    Non-intractable cyclical vomiting with nausea 10/04/2018    Fatigue due to exposure 04/14/2017    Essential hypertension 04/13/2017    Hyperlipidemia associated with type 2 diabetes mellitus ([place] [address] ) 04/13/2017    Methadone maintenance therapy patient ([place] [address] ) 04/13/2017    Type 2 diabetes mellitus with diabetic polyneuropathy ([place] [address] ) 04/13/2017    Chronic hepatitis C without hepatic coma ([place] [address] ) 02/27/2017     He  has no past surgical history on file  His family history includes Diabetes in his mother; Hypertension in his father  He  reports that he has quit smoking  He has never used smokeless tobacco  He reports that he does not drink alcohol or use drugs    Current Outpatient Prescriptions   Medication Sig Dispense Refill    furosemide (LASIX) 20 mg tablet Take 1 tablet (20 mg total) by mouth daily 90 tablet 1    insulin lispro (HUMALOG KWIKPEN) 100 units/mL injection pen Inject 20 Units under the skin 3 (three) times a day with meals 5 pen 4    Insulin Pen Needle 31G X 5 MM MISC by Does not apply route daily Pt to inject 4 X daily 100 each 5    LANTUS 100 UNIT/ML subcutaneous injection       lisinopril (ZESTRIL) 40 mg tablet Take 1 tablet (40 mg total) by mouth daily 90 tablet 1    methadone (DOLOPHINE) 10 MG/5ML solution Take 70 mg by mouth      omeprazole (PriLOSEC) 20 mg delayed release capsule Take 1 capsule (20 mg total) by mouth daily 30 capsule 5    potassium chloride (K-DUR,KLOR-CON) 20 mEq tablet Take 1 tablet (20 mEq total) by mouth daily 90 tablet 1    pravastatin (PRAVACHOL) 20 mg tablet Take 1 tablet (20 mg total) by mouth daily 90 tablet 1     No current facility-administered medications for this visit  Current Outpatient Prescriptions on File Prior to Visit   Medication Sig    furosemide (LASIX) 20 mg tablet Take 1 tablet (20 mg total) by mouth daily    insulin lispro (HUMALOG KWIKPEN) 100 units/mL injection pen Inject 20 Units under the skin 3 (three) times a day with meals    Insulin Pen Needle 31G X 5 MM MISC by Does not apply route daily Pt to inject 4 X daily    methadone (DOLOPHINE) 10 MG/5ML solution Take 70 mg by mouth    omeprazole (PriLOSEC) 20 mg delayed release capsule Take 1 capsule (20 mg total) by mouth daily    potassium chloride (K-DUR,KLOR-CON) 20 mEq tablet Take 1 tablet (20 mEq total) by mouth daily    pravastatin (PRAVACHOL) 20 mg tablet Take 1 tablet (20 mg total) by mouth daily    [DISCONTINUED] insulin glargine (LANTUS SOLOSTAR) 100 units/mL injection pen Inject 30 Units under the skin daily    [DISCONTINUED] Insulin Lispro (HUMALOG) 100 units/mL cartridge for injection Inject 10 Units under the skin 3 (three) times a day with meals    [DISCONTINUED] lisinopril (ZESTRIL) 30 mg tablet Take 1 tablet (30 mg total) by mouth daily     No current facility-administered medications on file prior to visit  He has No Known Allergies       Review of Systems   Constitutional: Negative  Negative for appetite change and fatigue  HENT: Negative  Negative for congestion, ear pain, rhinorrhea and sinus pain  Eyes: Negative  Negative for pain, itching and visual disturbance  Respiratory: Negative  Negative for cough, shortness of breath and wheezing  Cardiovascular: Positive for leg swelling  Negative for chest pain and palpitations  Gastrointestinal: Negative    Negative for abdominal pain, constipation, diarrhea, nausea and vomiting  Endocrine: Negative  Negative for polydipsia, polyphagia and polyuria  Genitourinary: Negative  Negative for difficulty urinating, frequency and urgency  Musculoskeletal: Negative  Negative for back pain and joint swelling  Skin: Negative  Negative for rash and wound  Allergic/Immunologic: Negative  Neurological: Negative  Negative for dizziness, weakness and headaches  Hematological: Negative  Negative for adenopathy  Does not bruise/bleed easily  Psychiatric/Behavioral: Negative  Negative for behavioral problems and sleep disturbance  The patient is not nervous/anxious  Objective:      /90 (BP Location: Left arm, Patient Position: Sitting, Cuff Size: Adult)   Pulse 93   Ht 5' 8" (1 727 m)   Wt 90 4 kg (199 lb 3 2 oz)   SpO2 98%   BMI 30 29 kg/m²          Physical Exam   Constitutional: He is oriented to person, place, and time  He appears well-developed and well-nourished  HENT:   Head: Normocephalic  Eyes: Pupils are equal, round, and reactive to light  Conjunctivae and EOM are normal    Neck: Normal range of motion  Neck supple  Cardiovascular: Normal rate and regular rhythm     /+1/4 bilateral lower extremity edema noted   Pulmonary/Chest: Effort normal and breath sounds normal    Abdominal: Soft  Bowel sounds are normal    Musculoskeletal: Normal range of motion  Neurological: He is alert and oriented to person, place, and time  Skin: Skin is warm and dry  Psychiatric: He has a normal mood and affect   His behavior is normal  Judgment and thought content normal

## 2018-12-29 ENCOUNTER — TRANSCRIBE ORDERS (OUTPATIENT)
Dept: ADMINISTRATIVE | Facility: HOSPITAL | Age: 58
End: 2018-12-29

## 2018-12-29 ENCOUNTER — APPOINTMENT (OUTPATIENT)
Dept: LAB | Facility: HOSPITAL | Age: 58
End: 2018-12-29
Payer: COMMERCIAL

## 2018-12-29 DIAGNOSIS — E11.42 TYPE 2 DIABETES MELLITUS WITH DIABETIC POLYNEUROPATHY, WITHOUT LONG-TERM CURRENT USE OF INSULIN (HCC): ICD-10-CM

## 2018-12-29 DIAGNOSIS — I10 ESSENTIAL HYPERTENSION: ICD-10-CM

## 2018-12-29 LAB
EST. AVERAGE GLUCOSE BLD GHB EST-MCNC: 295 MG/DL
GLUCOSE P FAST SERPL-MCNC: 196 MG/DL (ref 65–99)
HBA1C MFR BLD: 11.9 % (ref 4.2–6.3)

## 2018-12-29 PROCEDURE — 82947 ASSAY GLUCOSE BLOOD QUANT: CPT

## 2018-12-29 PROCEDURE — 83036 HEMOGLOBIN GLYCOSYLATED A1C: CPT

## 2018-12-29 PROCEDURE — 36415 COLL VENOUS BLD VENIPUNCTURE: CPT

## 2019-01-03 ENCOUNTER — OFFICE VISIT (OUTPATIENT)
Dept: FAMILY MEDICINE CLINIC | Facility: CLINIC | Age: 59
End: 2019-01-03
Payer: COMMERCIAL

## 2019-01-03 VITALS
WEIGHT: 201 LBS | OXYGEN SATURATION: 97 % | SYSTOLIC BLOOD PRESSURE: 128 MMHG | HEART RATE: 90 BPM | DIASTOLIC BLOOD PRESSURE: 80 MMHG | BODY MASS INDEX: 30.46 KG/M2 | HEIGHT: 68 IN

## 2019-01-03 DIAGNOSIS — I10 ESSENTIAL HYPERTENSION: ICD-10-CM

## 2019-01-03 DIAGNOSIS — E11.42 TYPE 2 DIABETES MELLITUS WITH DIABETIC POLYNEUROPATHY, WITHOUT LONG-TERM CURRENT USE OF INSULIN (HCC): Primary | ICD-10-CM

## 2019-01-03 DIAGNOSIS — E11.42 TYPE 2 DIABETES MELLITUS WITH DIABETIC POLYNEUROPATHY, WITHOUT LONG-TERM CURRENT USE OF INSULIN (HCC): ICD-10-CM

## 2019-01-03 PROCEDURE — 3079F DIAST BP 80-89 MM HG: CPT | Performed by: NURSE PRACTITIONER

## 2019-01-03 PROCEDURE — 3074F SYST BP LT 130 MM HG: CPT | Performed by: NURSE PRACTITIONER

## 2019-01-03 PROCEDURE — 99214 OFFICE O/P EST MOD 30 MIN: CPT | Performed by: NURSE PRACTITIONER

## 2019-01-03 RX ORDER — LISINOPRIL 40 MG/1
40 TABLET ORAL DAILY
Qty: 90 TABLET | Refills: 3 | Status: SHIPPED | OUTPATIENT
Start: 2019-01-03 | End: 2019-03-14 | Stop reason: SINTOL

## 2019-01-03 NOTE — PROGRESS NOTES
Assessment/Plan:    No problem-specific Assessment & Plan notes found for this encounter  Diagnoses and all orders for this visit:    Type 2 diabetes mellitus with diabetic polyneuropathy, without long-term current use of insulin ([facility])  Comments:  HgbA1C 11 9 sugar  196 will stop the Humalog and change to Humulin 70/30 30 IU bid with meals and recheck in 1 mos  Orders:  -     insulin isophane-insulin regular (HUMULIN 70/30 KWIKPEN) 100 units/mL injection pen; Inject 30 Units under the skin 2 (two) times a day before meals  -     Insulin Pen Needle 31G X 5 MM MISC; 30 units sq 2X daily    Essential hypertension  Comments:  Bp 124/86 pt is controlled on Lisinopril will reorder    Type 2 diabetes mellitus with diabetic polyneuropathy, without long-term current use of insulin ([facility])  Comments:  f/u in 1 mos  Orders:  -     insulin isophane-insulin regular (HUMULIN 70/30 KWIKPEN) 100 units/mL injection pen; Inject 30 Units under the skin 2 (two) times a day before meals  -     Insulin Pen Needle 31G X 5 MM MISC; 30 units sq 2X daily          Subjective:      Patient ID: Guru Harrington is a 59 y o  male  Diabetes Mellitus  Patient presents for follow up of diabetes  Current symptoms include: hyperglycemia  and paresthesia of the feet  Symptoms have waxed and waned  Patient denies foot ulcerations and vomiting  Evaluation to date has included: fasting blood sugar, fasting lipid panel and hemoglobin A1C  Home sugars: BGs have been labile ranging between 123-200  Current treatment: Initiated a trial of insulin which has been somewhat effective  Will d/c Humalog and start 70/30 as pts HgbA1C has increased to 11 9 yet BS is dramatically lower 196 from 400 + Last dilated eye exam: 9/18          The following portions of the patient's history were reviewed and updated as appropriate:   He  has no past medical history on file    He   Patient Active Problem List    Diagnosis Date Noted    Bilateral lower extremity edema 11/27/2018    Callous ulcer, limited to breakdown of skin ([place] [id number] ) 10/30/2018    Non-intractable cyclical vomiting with nausea 10/04/2018    Fatigue due to exposure 04/14/2017    Essential hypertension 04/13/2017    Hyperlipidemia associated with type 2 diabetes mellitus ([place] [address] ) 04/13/2017    Methadone maintenance therapy patient ([place] [address] ) 04/13/2017    Type 2 diabetes mellitus with diabetic polyneuropathy ([place] [address] ) 04/13/2017    Chronic hepatitis C without hepatic coma ([place] [address] ) 02/27/2017     He  has no past surgical history on file  His family history includes Diabetes in his mother; Hypertension in his father  He  reports that he has quit smoking  He has never used smokeless tobacco  He reports that he does not drink alcohol or use drugs  Current Outpatient Prescriptions   Medication Sig Dispense Refill    furosemide (LASIX) 20 mg tablet Take 1 tablet (20 mg total) by mouth daily 90 tablet 1    Insulin Pen Needle 31G X 5 MM MISC by Does not apply route daily Pt to inject 4 X daily 100 each 5    LANTUS 100 UNIT/ML subcutaneous injection       lisinopril (ZESTRIL) 40 mg tablet Take 1 tablet (40 mg total) by mouth daily 90 tablet 1    methadone (DOLOPHINE) 10 MG/5ML solution Take 70 mg by mouth      omeprazole (PriLOSEC) 20 mg delayed release capsule Take 1 capsule (20 mg total) by mouth daily 30 capsule 5    potassium chloride (K-DUR,KLOR-CON) 20 mEq tablet Take 1 tablet (20 mEq total) by mouth daily 90 tablet 1    pravastatin (PRAVACHOL) 20 mg tablet Take 1 tablet (20 mg total) by mouth daily 90 tablet 1    insulin isophane-insulin regular (HUMULIN 70/30 KWIKPEN) 100 units/mL injection pen Inject 30 Units under the skin 2 (two) times a day before meals 5 pen 5    Insulin Pen Needle 31G X 5 MM MISC 30 units sq 2X daily 100 each 3     No current facility-administered medications for this visit        Current Outpatient Prescriptions on File Prior to Visit   Medication Sig    furosemide (LASIX) 20 mg tablet Take 1 tablet (20 mg total) by mouth daily    Insulin Pen Needle 31G X 5 MM MISC by Does not apply route daily Pt to inject 4 X daily    LANTUS 100 UNIT/ML subcutaneous injection     lisinopril (ZESTRIL) 40 mg tablet Take 1 tablet (40 mg total) by mouth daily    methadone (DOLOPHINE) 10 MG/5ML solution Take 70 mg by mouth    omeprazole (PriLOSEC) 20 mg delayed release capsule Take 1 capsule (20 mg total) by mouth daily    potassium chloride (K-DUR,KLOR-CON) 20 mEq tablet Take 1 tablet (20 mEq total) by mouth daily    pravastatin (PRAVACHOL) 20 mg tablet Take 1 tablet (20 mg total) by mouth daily    [DISCONTINUED] insulin lispro (HUMALOG KWIKPEN) 100 units/mL injection pen Inject 20 Units under the skin 3 (three) times a day with meals     No current facility-administered medications on file prior to visit  He has No Known Allergies       Review of Systems   Constitutional: Negative  Negative for appetite change and fatigue  HENT: Negative  Negative for congestion, ear pain, rhinorrhea and sinus pain  Eyes: Negative  Negative for pain, itching and visual disturbance  Respiratory: Negative  Negative for cough, shortness of breath and wheezing  Cardiovascular: Positive for leg swelling  Negative for chest pain and palpitations  Gastrointestinal: Negative  Negative for abdominal pain, constipation, diarrhea, nausea and vomiting  Endocrine: Negative  Negative for polydipsia, polyphagia and polyuria  Genitourinary: Negative  Negative for difficulty urinating, frequency and urgency  Musculoskeletal: Negative  Negative for back pain and joint swelling  Skin: Negative  Negative for rash and wound  Allergic/Immunologic: Negative  Neurological: Negative  Negative for dizziness, weakness and headaches  Still needs podiatry exam for diabetic foot care   Hematological: Negative  Negative for adenopathy  Does not bruise/bleed easily  Psychiatric/Behavioral: Negative    Negative for behavioral problems and sleep disturbance  The patient is not nervous/anxious  Objective:      /80 (BP Location: Left arm, Patient Position: Sitting, Cuff Size: Adult)   Pulse 90   Ht 5' 8" (1 727 m)   Wt 91 2 kg (201 lb)   SpO2 97%   BMI 30 56 kg/m²          Physical Exam   Constitutional: He is oriented to person, place, and time  He appears well-developed and well-nourished  HENT:   Head: Normocephalic  Eyes: Pupils are equal, round, and reactive to light  Conjunctivae and EOM are normal    Neck: Normal range of motion  Neck supple  Cardiovascular: Normal rate and regular rhythm     /+1/4 bilateral lower extremity edema noted   Pulmonary/Chest: Effort normal and breath sounds normal    Abdominal: Soft  Bowel sounds are normal    Musculoskeletal: Normal range of motion  Neurological: He is alert and oriented to person, place, and time  Skin: Skin is warm and dry  Psychiatric: He has a normal mood and affect   His behavior is normal  Judgment and thought content normal

## 2019-01-17 DIAGNOSIS — E11.9 TYPE 2 DIABETES MELLITUS WITHOUT COMPLICATION, WITHOUT LONG-TERM CURRENT USE OF INSULIN (HCC): ICD-10-CM

## 2019-01-17 RX ORDER — INSULIN GLARGINE 100 [IU]/ML
INJECTION, SOLUTION SUBCUTANEOUS
Qty: 10 ML | Refills: 5 | Status: SHIPPED | OUTPATIENT
Start: 2019-01-17 | End: 2019-03-05 | Stop reason: SDUPTHER

## 2019-02-19 ENCOUNTER — APPOINTMENT (OUTPATIENT)
Dept: LAB | Facility: CLINIC | Age: 59
End: 2019-02-19
Payer: COMMERCIAL

## 2019-02-19 ENCOUNTER — OFFICE VISIT (OUTPATIENT)
Dept: FAMILY MEDICINE CLINIC | Facility: CLINIC | Age: 59
End: 2019-02-19
Payer: COMMERCIAL

## 2019-02-19 VITALS
HEART RATE: 102 BPM | DIASTOLIC BLOOD PRESSURE: 90 MMHG | SYSTOLIC BLOOD PRESSURE: 150 MMHG | BODY MASS INDEX: 31.22 KG/M2 | WEIGHT: 206 LBS | TEMPERATURE: 99.9 F | HEIGHT: 68 IN | OXYGEN SATURATION: 98 %

## 2019-02-19 DIAGNOSIS — R60.0 BILATERAL LOWER EXTREMITY EDEMA: ICD-10-CM

## 2019-02-19 DIAGNOSIS — R53.83 OTHER FATIGUE: ICD-10-CM

## 2019-02-19 DIAGNOSIS — E11.42 TYPE 2 DIABETES MELLITUS WITH DIABETIC POLYNEUROPATHY, WITHOUT LONG-TERM CURRENT USE OF INSULIN (HCC): ICD-10-CM

## 2019-02-19 DIAGNOSIS — E55.9 VITAMIN D DEFICIENCY: ICD-10-CM

## 2019-02-19 DIAGNOSIS — E11.42 TYPE 2 DIABETES MELLITUS WITH DIABETIC POLYNEUROPATHY, WITHOUT LONG-TERM CURRENT USE OF INSULIN (HCC): Primary | ICD-10-CM

## 2019-02-19 DIAGNOSIS — I10 ESSENTIAL HYPERTENSION: ICD-10-CM

## 2019-02-19 DIAGNOSIS — R11.2 NAUSEA AND VOMITING, INTRACTABILITY OF VOMITING NOT SPECIFIED, UNSPECIFIED VOMITING TYPE: ICD-10-CM

## 2019-02-19 DIAGNOSIS — R22.43 LOCALIZED SWELLING OF BOTH LOWER LEGS: ICD-10-CM

## 2019-02-19 DIAGNOSIS — R10.9 FLANK PAIN: ICD-10-CM

## 2019-02-19 LAB
BILIRUB UR QL STRIP: NEGATIVE
CLARITY UR: CLEAR
COLOR UR: YELLOW
CREAT UR-MCNC: 64.3 MG/DL
GLUCOSE UR STRIP-MCNC: NEGATIVE MG/DL
HGB UR QL STRIP.AUTO: NEGATIVE
KETONES UR STRIP-MCNC: NEGATIVE MG/DL
LEUKOCYTE ESTERASE UR QL STRIP: NEGATIVE
MICROALBUMIN UR-MCNC: 77.7 MG/L (ref 0–20)
MICROALBUMIN/CREAT 24H UR: 121 MG/G CREATININE (ref 0–30)
NITRITE UR QL STRIP: NEGATIVE
PH UR STRIP.AUTO: 5.5 [PH] (ref 4.5–8)
PROT UR STRIP-MCNC: NEGATIVE MG/DL
SL AMB POCT HEMOGLOBIN AIC: 10.4 (ref ?–6.5)
SP GR UR STRIP.AUTO: 1.01 (ref 1–1.03)
UROBILINOGEN UR QL STRIP.AUTO: 0.2 E.U./DL

## 2019-02-19 PROCEDURE — 82570 ASSAY OF URINE CREATININE: CPT | Performed by: NURSE PRACTITIONER

## 2019-02-19 PROCEDURE — 3060F POS MICROALBUMINURIA REV: CPT | Performed by: NURSE PRACTITIONER

## 2019-02-19 PROCEDURE — 82043 UR ALBUMIN QUANTITATIVE: CPT | Performed by: NURSE PRACTITIONER

## 2019-02-19 PROCEDURE — 81003 URINALYSIS AUTO W/O SCOPE: CPT | Performed by: NURSE PRACTITIONER

## 2019-02-19 PROCEDURE — 3046F HEMOGLOBIN A1C LEVEL >9.0%: CPT | Performed by: NURSE PRACTITIONER

## 2019-02-19 PROCEDURE — 99214 OFFICE O/P EST MOD 30 MIN: CPT | Performed by: NURSE PRACTITIONER

## 2019-02-19 PROCEDURE — 83036 HEMOGLOBIN GLYCOSYLATED A1C: CPT | Performed by: NURSE PRACTITIONER

## 2019-02-19 RX ORDER — FUROSEMIDE 20 MG/1
40 TABLET ORAL DAILY
Qty: 90 TABLET | Refills: 3 | Status: SHIPPED | OUTPATIENT
Start: 2019-02-19 | End: 2019-03-05

## 2019-02-19 RX ORDER — POTASSIUM CHLORIDE 20 MEQ/1
40 TABLET, EXTENDED RELEASE ORAL DAILY
Qty: 180 TABLET | Refills: 3 | Status: SHIPPED | OUTPATIENT
Start: 2019-02-19 | End: 2019-05-28 | Stop reason: SDUPTHER

## 2019-02-19 RX ORDER — OMEPRAZOLE 20 MG/1
20 CAPSULE, DELAYED RELEASE ORAL DAILY
Qty: 90 CAPSULE | Refills: 3 | Status: SHIPPED | OUTPATIENT
Start: 2019-02-19 | End: 2019-05-28 | Stop reason: SDUPTHER

## 2019-02-19 NOTE — PROGRESS NOTES
Assessment/Plan:    No problem-specific Assessment & Plan notes found for this encounter  Diagnoses and all orders for this visit:    Type 2 diabetes mellitus with diabetic polyneuropathy, without long-term current use of insulin ([facility])  Comments:  POCT HgbA1C 10 4 today pt reports 200 plus glucose at home  will change pt to Humulin U500 bid  Orders:  -     Cancel: Microalbumin / creatinine urine ratio  -     POCT hemoglobin A1c  -     Hemoglobin A1C; Future  -     Microalbumin / creatinine urine ratio; Future  -     Insulin Regular Human, Conc, (HUMULIN R U-500 KWIKPEN) 500 units/mL CONCENTRATED U-500 injection pen; Inject 30 Units under the skin 2 (two) times a day before meals    Essential hypertension  Comments:  Bp 150/90 today pt advised to take another Lisinopril but otherwise Bp has been PCH293/80  Orders:  -     CBC and differential; Future    Bilateral lower extremity edema  Comments:  Pt is using Furosemide as needed for lower extremity swelling  lower edema not present today  Orders:  -     Comprehensive metabolic panel    Localized swelling of both lower legs  -     furosemide (LASIX) 20 mg tablet; Take 2 tablets (40 mg total) by mouth daily  -     potassium chloride (K-DUR,KLOR-CON) 20 mEq tablet; Take 2 tablets (40 mEq total) by mouth daily  -     UA w Reflex to Microscopic w Reflex to Culture    Nausea and vomiting, intractability of vomiting not specified, unspecified vomiting type  Comments:  pt reports no incidence of n/v today with use of Omeprazole  Orders:  -     omeprazole (PriLOSEC) 20 mg delayed release capsule; Take 1 capsule (20 mg total) by mouth daily    Flank pain  Comments:  UA and Microalbumin ordered for today  Orders:  -     UA w Reflex to Microscopic w Reflex to Culture    Other fatigue  Comments:  Labwork ordered  Orders:  -     TSH, 3rd generation with Free T4 reflex; Future    Vitamin D deficiency  Comments:  Labwork ordered  Orders:  -     Vitamin D 25 hydroxy;  Future Subjective:      Patient ID: Sari Allen is a 59 y o  male  Diabetes Mellitus  Patient presents for follow up of diabetes  Current symptoms include: paresthesia of the feet  Symptoms have gradually improved  Patient denies any problems    Evaluation to date has included: fasting blood sugar, fasting lipid panel, hemoglobin A1C and microalbuminuria  Home sugars: BGs have been labile ranging between 200-250  and  Current treatment: Humulin 70/30 and Lantus insulin which has been somewhat effective  I will change from Humulin 70/30 to U500  Last dilated eye exam: 9/18        The following portions of the patient's history were reviewed and updated as appropriate: He  has no past medical history on file  He   Patient Active Problem List    Diagnosis Date Noted    Bilateral lower extremity edema 11/27/2018    Callous ulcer, limited to breakdown of skin ([facility] ) 10/30/2018    Non-intractable cyclical vomiting with nausea 10/04/2018    Fatigue due to exposure 04/14/2017    Essential hypertension 04/13/2017    Hyperlipidemia associated with type 2 diabetes mellitus ([facility] ) 04/13/2017    Methadone maintenance therapy patient ([place] [id number] ) 04/13/2017    Type 2 diabetes mellitus with diabetic polyneuropathy ([place] [address] ) 04/13/2017    Chronic hepatitis C without hepatic coma ([place] [address] ) 02/27/2017     He  has no past surgical history on file  His family history includes Diabetes in his mother; Hypertension in his father  He  reports that he has quit smoking  He has never used smokeless tobacco  He reports that he does not drink alcohol or use drugs    Current Outpatient Medications   Medication Sig Dispense Refill    furosemide (LASIX) 20 mg tablet Take 2 tablets (40 mg total) by mouth daily 90 tablet 3    Insulin Pen Needle 31G X 5 MM MISC by Does not apply route daily Pt to inject 4 X daily 100 each 5    Insulin Pen Needle 31G X 5 MM MISC 30 units sq 2X daily 100 each 3    Insulin Regular Human, Conc, (HUMULIN R U-500 KWIKPEN) 500 units/mL CONCENTRATED U-500 injection pen Inject 30 Units under the skin 2 (two) times a day before meals 5 pen 5    LANTUS 100 UNIT/ML subcutaneous injection INJECT 20 UNITS SUBCUTANEOUSLY ONCE DAILY 10 mL 5    lisinopril (ZESTRIL) 40 mg tablet Take 1 tablet (40 mg total) by mouth daily 90 tablet 3    methadone (DOLOPHINE) 10 MG/5ML solution Take 70 mg by mouth      omeprazole (PriLOSEC) 20 mg delayed release capsule Take 1 capsule (20 mg total) by mouth daily 90 capsule 3    potassium chloride (K-DUR,KLOR-CON) 20 mEq tablet Take 2 tablets (40 mEq total) by mouth daily 180 tablet 3    pravastatin (PRAVACHOL) 20 mg tablet Take 1 tablet (20 mg total) by mouth daily 90 tablet 1     No current facility-administered medications for this visit  Current Outpatient Medications on File Prior to Visit   Medication Sig    Insulin Pen Needle 31G X 5 MM MISC by Does not apply route daily Pt to inject 4 X daily    Insulin Pen Needle 31G X 5 MM MISC 30 units sq 2X daily    LANTUS 100 UNIT/ML subcutaneous injection INJECT 20 UNITS SUBCUTANEOUSLY ONCE DAILY    lisinopril (ZESTRIL) 40 mg tablet Take 1 tablet (40 mg total) by mouth daily    methadone (DOLOPHINE) 10 MG/5ML solution Take 70 mg by mouth    pravastatin (PRAVACHOL) 20 mg tablet Take 1 tablet (20 mg total) by mouth daily    [DISCONTINUED] furosemide (LASIX) 20 mg tablet Take 1 tablet (20 mg total) by mouth daily    [DISCONTINUED] insulin isophane-insulin regular (HUMULIN 70/30 KWIKPEN) 100 units/mL injection pen Inject 30 Units under the skin 2 (two) times a day before meals    [DISCONTINUED] LANTUS 100 UNIT/ML subcutaneous injection     [DISCONTINUED] omeprazole (PriLOSEC) 20 mg delayed release capsule Take 1 capsule (20 mg total) by mouth daily    [DISCONTINUED] potassium chloride (K-DUR,KLOR-CON) 20 mEq tablet Take 1 tablet (20 mEq total) by mouth daily     No current facility-administered medications on file prior to visit        He has No Known Allergies       Lab Results   Component Value Date    HGBA1C 10 4 (A) 02/19/2019     Lab Results   Component Value Date    GLUF 196 (H) 12/29/2018       Review of Systems   Constitutional: Negative  Negative for appetite change and fatigue  HENT: Negative  Negative for congestion, ear pain, rhinorrhea and sinus pain  Eyes: Negative  Negative for pain, itching and visual disturbance  Respiratory: Negative  Negative for cough, shortness of breath and wheezing  Cardiovascular: Positive for leg swelling  Negative for chest pain and palpitations  Gastrointestinal: Negative  Negative for abdominal pain, constipation, diarrhea, nausea and vomiting  Endocrine: Negative  Negative for polydipsia, polyphagia and polyuria  Genitourinary: Negative  Negative for difficulty urinating, frequency and urgency  Musculoskeletal: Negative  Negative for back pain and joint swelling  Skin: Negative  Negative for rash and wound  Allergic/Immunologic: Negative  Neurological: Negative  Negative for dizziness, weakness and headaches  Still needs podiatry exam for diabetic foot care   Hematological: Negative  Negative for adenopathy  Does not bruise/bleed easily  Psychiatric/Behavioral: Negative  Negative for behavioral problems and sleep disturbance  The patient is not nervous/anxious  Objective:      /90   Pulse 102   Temp 99 9 °F (37 7 °C)   Ht 5' 8" (1 727 m)   Wt 93 4 kg (206 lb)   SpO2 98%   BMI 31 32 kg/m²          Physical Exam   Constitutional: He is oriented to person, place, and time  He appears well-developed and well-nourished  HENT:   Head: Normocephalic  Eyes: Pupils are equal, round, and reactive to light  Conjunctivae and EOM are normal    Neck: Normal range of motion  Neck supple  Cardiovascular: Normal rate and regular rhythm    /+1/4 bilateral lower extremity edema noted   Pulmonary/Chest: Effort normal and breath sounds normal    Abdominal: Soft  Bowel sounds are normal    Firm and round but non tender   Musculoskeletal: Normal range of motion  Neurological: He is alert and oriented to person, place, and time  Skin: Skin is warm and dry  Psychiatric: He has a normal mood and affect   His behavior is normal  Judgment and thought content normal

## 2019-02-26 ENCOUNTER — TRANSCRIBE ORDERS (OUTPATIENT)
Dept: ADMINISTRATIVE | Facility: HOSPITAL | Age: 59
End: 2019-02-26

## 2019-02-26 ENCOUNTER — HOSPITAL ENCOUNTER (OUTPATIENT)
Dept: ULTRASOUND IMAGING | Facility: HOSPITAL | Age: 59
Discharge: HOME | End: 2019-02-26
Payer: COMMERCIAL

## 2019-02-26 ENCOUNTER — OFFICE VISIT (OUTPATIENT)
Dept: FAMILY MEDICINE CLINIC | Facility: CLINIC | Age: 59
End: 2019-02-26
Payer: COMMERCIAL

## 2019-02-26 ENCOUNTER — APPOINTMENT (OUTPATIENT)
Dept: LAB | Facility: HOSPITAL | Age: 59
End: 2019-02-26
Payer: COMMERCIAL

## 2019-02-26 VITALS
DIASTOLIC BLOOD PRESSURE: 92 MMHG | WEIGHT: 204 LBS | SYSTOLIC BLOOD PRESSURE: 164 MMHG | BODY MASS INDEX: 30.92 KG/M2 | TEMPERATURE: 99.3 F | HEIGHT: 68 IN | HEART RATE: 107 BPM | OXYGEN SATURATION: 95 %

## 2019-02-26 DIAGNOSIS — I10 HYPERTENSION, ESSENTIAL: ICD-10-CM

## 2019-02-26 DIAGNOSIS — I10 ESSENTIAL HYPERTENSION: ICD-10-CM

## 2019-02-26 DIAGNOSIS — R10.9 FLANK PAIN: ICD-10-CM

## 2019-02-26 DIAGNOSIS — I10 HYPERTENSION, ESSENTIAL: Primary | ICD-10-CM

## 2019-02-26 DIAGNOSIS — E55.9 VITAMIN D DEFICIENCY: ICD-10-CM

## 2019-02-26 DIAGNOSIS — E11.8 TYPE 2 DIABETES MELLITUS WITH COMPLICATION, WITHOUT LONG-TERM CURRENT USE OF INSULIN (HCC): ICD-10-CM

## 2019-02-26 DIAGNOSIS — R10.9 FLANK PAIN: Primary | ICD-10-CM

## 2019-02-26 DIAGNOSIS — R53.83 OTHER FATIGUE: ICD-10-CM

## 2019-02-26 DIAGNOSIS — E11.42 TYPE 2 DIABETES MELLITUS WITH DIABETIC POLYNEUROPATHY, WITHOUT LONG-TERM CURRENT USE OF INSULIN (HCC): ICD-10-CM

## 2019-02-26 LAB
25(OH)D3 SERPL-MCNC: 14.5 NG/ML (ref 30–100)
ALBUMIN SERPL BCP-MCNC: 4.2 G/DL (ref 3.5–5.7)
ALP SERPL-CCNC: 115 U/L (ref 55–165)
ALT SERPL W P-5'-P-CCNC: 17 U/L (ref 7–52)
ANION GAP SERPL CALCULATED.3IONS-SCNC: 8 MMOL/L (ref 4–13)
AST SERPL W P-5'-P-CCNC: 17 U/L (ref 13–39)
BASOPHILS # BLD AUTO: 0.1 THOUSANDS/ΜL (ref 0–0.1)
BASOPHILS NFR BLD AUTO: 1 % (ref 0–1)
BILIRUB SERPL-MCNC: 1.2 MG/DL (ref 0.2–1)
BUN SERPL-MCNC: 25 MG/DL (ref 7–25)
CALCIUM SERPL-MCNC: 9.5 MG/DL (ref 8.6–10.5)
CHLORIDE SERPL-SCNC: 97 MMOL/L (ref 98–107)
CO2 SERPL-SCNC: 28 MMOL/L (ref 21–31)
CREAT SERPL-MCNC: 1.27 MG/DL (ref 0.7–1.3)
EOSINOPHIL # BLD AUTO: 0.1 THOUSAND/ΜL (ref 0–0.61)
EOSINOPHIL NFR BLD AUTO: 2 % (ref 0–6)
ERYTHROCYTE [DISTWIDTH] IN BLOOD BY AUTOMATED COUNT: 13.7 % (ref 11.6–15.1)
EST. AVERAGE GLUCOSE BLD GHB EST-MCNC: 278 MG/DL
GFR SERPL CREATININE-BSD FRML MDRD: 61 ML/MIN/1.73SQ M
GLUCOSE P FAST SERPL-MCNC: 305 MG/DL (ref 65–99)
HBA1C MFR BLD: 11.3 % (ref 4.2–6.3)
HCT VFR BLD AUTO: 42.9 % (ref 42–52)
HGB BLD-MCNC: 14.7 G/DL (ref 12–17)
LYMPHOCYTES # BLD AUTO: 1.5 THOUSANDS/ΜL (ref 0.6–4.47)
LYMPHOCYTES NFR BLD AUTO: 20 % (ref 14–44)
MCH RBC QN AUTO: 29.1 PG (ref 26.8–34.3)
MCHC RBC AUTO-ENTMCNC: 34.2 G/DL (ref 31.4–37.4)
MCV RBC AUTO: 85 FL (ref 82–98)
MONOCYTES # BLD AUTO: 0.6 THOUSAND/ΜL (ref 0.17–1.22)
MONOCYTES NFR BLD AUTO: 7 % (ref 4–12)
NEUTROPHILS # BLD AUTO: 5.3 THOUSANDS/ΜL (ref 1.85–7.62)
NEUTS SEG NFR BLD AUTO: 71 % (ref 43–75)
NRBC BLD AUTO-RTO: 0 /100 WBCS
PLATELET # BLD AUTO: 149 THOUSANDS/UL (ref 149–390)
PMV BLD AUTO: 10.4 FL (ref 8.9–12.7)
POTASSIUM SERPL-SCNC: 5.3 MMOL/L (ref 3.5–5.5)
PROT SERPL-MCNC: 8.3 G/DL (ref 6.4–8.9)
RBC # BLD AUTO: 5.03 MILLION/UL (ref 3.88–5.62)
SODIUM SERPL-SCNC: 133 MMOL/L (ref 134–143)
TSH SERPL DL<=0.05 MIU/L-ACNC: 3.04 UIU/ML (ref 0.45–5.33)
WBC # BLD AUTO: 7.6 THOUSAND/UL (ref 4.31–10.16)

## 2019-02-26 PROCEDURE — 83036 HEMOGLOBIN GLYCOSYLATED A1C: CPT

## 2019-02-26 PROCEDURE — 82306 VITAMIN D 25 HYDROXY: CPT

## 2019-02-26 PROCEDURE — 84443 ASSAY THYROID STIM HORMONE: CPT

## 2019-02-26 PROCEDURE — 99214 OFFICE O/P EST MOD 30 MIN: CPT | Performed by: NURSE PRACTITIONER

## 2019-02-26 PROCEDURE — 36415 COLL VENOUS BLD VENIPUNCTURE: CPT

## 2019-02-26 PROCEDURE — 80053 COMPREHEN METABOLIC PANEL: CPT | Performed by: NURSE PRACTITIONER

## 2019-02-26 PROCEDURE — 85025 COMPLETE CBC W/AUTO DIFF WBC: CPT

## 2019-02-26 PROCEDURE — 76770 US EXAM ABDO BACK WALL COMP: CPT

## 2019-02-26 NOTE — LETTER
February 26, 2019     Patient: Adwoa Shaw   YOB: 1960   Date of Visit: 2/26/2019       To Whom it May Concern:    Adwoa Shaw is under my professional care  He was seen in my office on 2/26/2019  He may return to work on 2/25-/19 through 3/1/19 may return Saturday 3/2/19  Pt is suffering from renal failure related to diabetes       If you have any questions or concerns, please don't hesitate to call           Sincerely,          VERONICA Slo        CC: No Recipients

## 2019-02-26 NOTE — PROGRESS NOTES
Assessment/Plan:    No problem-specific Assessment & Plan notes found for this encounter  Diagnoses and all orders for this visit:    Flank pain  Comments:  Stat US kidney and bladder ordered  Orders:  -     Cancel: US kidney and bladder; Future  -     Comprehensive metabolic panel  -     Cancel: US kidney and bladder; Future  -     US kidney and bladder; Future    Essential hypertension  Comments:  bp 164/80 today pt did not take his antihypertensive and has not eaten today  Orders:  -     Cancel: US kidney and bladder; Future  -     Comprehensive metabolic panel  -     Cancel: US kidney and bladder; Future    Type 2 diabetes mellitus with complication, without long-term current use of insulin ([facility])  Comments:  Pt has not picked up his Humulin  U 500 yet, labwork ordered  Orders:  -     Cancel: US kidney and bladder; Future  -     US kidney and bladder; Future          Subjective:      Patient ID: Sadie Chauhan is a 59 y o  male  59 yr old male arrives with c/o bilateral flank pain, in obvious discomfort  Pt did report this to me last week but states pain is not resolving  Pt reports constant urinary pressure but not frequency or burning  Pt denies fever, nausea vomiting, BS today was 217 fasting  Pt states the pain is unrelieved by any NSAIDS and he could not go to work last pm due to pain  We discussed Microalbumin urine positive and need to refer to Nephrology  Pt also reports no back injury, but continues to retain fluid despite diuretics daily  I'm not certain about level of compliance with his medications as he reportedly did not take his antihypertensive today  I will order stat US kidney and bladder, CMP and CBC and develop plan of care moving forward      The following portions of the patient's history were reviewed and updated as appropriate: He  has no past medical history on file    He   Patient Active Problem List    Diagnosis Date Noted    Bilateral lower extremity edema 11/27/2018  Callous ulcer, limited to breakdown of skin ([place] [address] ) 10/30/2018    Non-intractable cyclical vomiting with nausea 10/04/2018    Fatigue due to exposure 04/14/2017    Essential hypertension 04/13/2017    Hyperlipidemia associated with type 2 diabetes mellitus ([place] [address] ) 04/13/2017    Methadone maintenance therapy patient ([place] [address] ) 04/13/2017    Type 2 diabetes mellitus with diabetic polyneuropathy ([place] [address] ) 04/13/2017    Chronic hepatitis C without hepatic coma ([place] [address] ) 02/27/2017     He  has no past surgical history on file  His family history includes Diabetes in his mother; Hypertension in his father  He  reports that he has quit smoking  He has never used smokeless tobacco  He reports that he does not drink alcohol or use drugs  Current Outpatient Medications   Medication Sig Dispense Refill    furosemide (LASIX) 20 mg tablet Take 2 tablets (40 mg total) by mouth daily 90 tablet 3    Insulin Pen Needle 31G X 5 MM MISC by Does not apply route daily Pt to inject 4 X daily 100 each 5    Insulin Pen Needle 31G X 5 MM MISC 30 units sq 2X daily 100 each 3    Insulin Regular Human, Conc, (HUMULIN R U-500 KWIKPEN) 500 units/mL CONCENTRATED U-500 injection pen Inject 30 Units under the skin 2 (two) times a day before meals 5 pen 5    LANTUS 100 UNIT/ML subcutaneous injection INJECT 20 UNITS SUBCUTANEOUSLY ONCE DAILY 10 mL 5    lisinopril (ZESTRIL) 40 mg tablet Take 1 tablet (40 mg total) by mouth daily 90 tablet 3    methadone (DOLOPHINE) 10 MG/5ML solution Take 70 mg by mouth      omeprazole (PriLOSEC) 20 mg delayed release capsule Take 1 capsule (20 mg total) by mouth daily 90 capsule 3    potassium chloride (K-DUR,KLOR-CON) 20 mEq tablet Take 2 tablets (40 mEq total) by mouth daily 180 tablet 3    pravastatin (PRAVACHOL) 20 mg tablet Take 1 tablet (20 mg total) by mouth daily 90 tablet 1     No current facility-administered medications for this visit        Current Outpatient Medications on File Prior to Visit Medication Sig    furosemide (LASIX) 20 mg tablet Take 2 tablets (40 mg total) by mouth daily    Insulin Pen Needle 31G X 5 MM MISC by Does not apply route daily Pt to inject 4 X daily    Insulin Pen Needle 31G X 5 MM MISC 30 units sq 2X daily    Insulin Regular Human, Conc, (HUMULIN R U-500 KWIKPEN) 500 units/mL CONCENTRATED U-500 injection pen Inject 30 Units under the skin 2 (two) times a day before meals    LANTUS 100 UNIT/ML subcutaneous injection INJECT 20 UNITS SUBCUTANEOUSLY ONCE DAILY    lisinopril (ZESTRIL) 40 mg tablet Take 1 tablet (40 mg total) by mouth daily    methadone (DOLOPHINE) 10 MG/5ML solution Take 70 mg by mouth    omeprazole (PriLOSEC) 20 mg delayed release capsule Take 1 capsule (20 mg total) by mouth daily    potassium chloride (K-DUR,KLOR-CON) 20 mEq tablet Take 2 tablets (40 mEq total) by mouth daily    pravastatin (PRAVACHOL) 20 mg tablet Take 1 tablet (20 mg total) by mouth daily     No current facility-administered medications on file prior to visit  He has No Known Allergies       Review of Systems   Constitutional: Positive for appetite change  Gastrointestinal: Positive for nausea  Negative for abdominal pain, constipation and vomiting  Genitourinary: Positive for flank pain  Negative for difficulty urinating, frequency, genital sores and hematuria  Objective:      /92   Pulse (!) 107   Temp 99 3 °F (37 4 °C) (Tympanic)   Ht 5' 8" (1 727 m)   Wt 92 5 kg (204 lb)   SpO2 95%   BMI 31 02 kg/m²          Physical Exam   Constitutional: He appears ill  He appears distressed  Cardiovascular:   Bilateral lower extremity edema   Abdominal: There is CVA tenderness

## 2019-03-05 ENCOUNTER — OFFICE VISIT (OUTPATIENT)
Dept: FAMILY MEDICINE CLINIC | Facility: CLINIC | Age: 59
End: 2019-03-05
Payer: COMMERCIAL

## 2019-03-05 VITALS
WEIGHT: 205.6 LBS | BODY MASS INDEX: 31.16 KG/M2 | DIASTOLIC BLOOD PRESSURE: 80 MMHG | HEART RATE: 94 BPM | TEMPERATURE: 97.9 F | SYSTOLIC BLOOD PRESSURE: 140 MMHG | OXYGEN SATURATION: 99 % | HEIGHT: 68 IN

## 2019-03-05 DIAGNOSIS — E11.9 ENCOUNTER FOR DIABETIC FOOT EXAM (HCC): ICD-10-CM

## 2019-03-05 DIAGNOSIS — E11.42 TYPE 2 DIABETES MELLITUS WITH DIABETIC POLYNEUROPATHY, UNSPECIFIED WHETHER LONG TERM INSULIN USE (HCC): Primary | ICD-10-CM

## 2019-03-05 DIAGNOSIS — R22.43 LOCALIZED SWELLING OF BOTH LOWER LEGS: ICD-10-CM

## 2019-03-05 DIAGNOSIS — E11.9 TYPE 2 DIABETES MELLITUS WITHOUT COMPLICATION, UNSPECIFIED WHETHER LONG TERM INSULIN USE (HCC): ICD-10-CM

## 2019-03-05 DIAGNOSIS — L98.491 CALLOUS ULCER, LIMITED TO BREAKDOWN OF SKIN (HCC): ICD-10-CM

## 2019-03-05 DIAGNOSIS — I10 ESSENTIAL HYPERTENSION: ICD-10-CM

## 2019-03-05 PROCEDURE — 99214 OFFICE O/P EST MOD 30 MIN: CPT | Performed by: NURSE PRACTITIONER

## 2019-03-05 RX ORDER — INSULIN GLARGINE 100 [IU]/ML
20 INJECTION, SOLUTION SUBCUTANEOUS DAILY
Qty: 10 ML | Refills: 5 | Status: SHIPPED | OUTPATIENT
Start: 2019-03-05 | End: 2019-05-28 | Stop reason: SDUPTHER

## 2019-03-05 RX ORDER — FUROSEMIDE 40 MG/1
40 TABLET ORAL DAILY
Qty: 90 TABLET | Refills: 3 | Status: SHIPPED | OUTPATIENT
Start: 2019-03-05 | End: 2019-05-28 | Stop reason: SDUPTHER

## 2019-03-05 NOTE — LETTER
March 5, 2019     Patient: Nacho Landeros   YOB: 1960   Date of Visit: 3/5/2019       To Whom it May Concern:    Nacho Landeros is under my professional care  He was seen in my office on 3/5/2019  He may return to work on March 6, 2019  Pt is suffering from complications associated with Diabetes  If you have any questions or concerns, please don't hesitate to call           Sincerely,          VERONICA Joseph        CC: No Recipients

## 2019-03-05 NOTE — PROGRESS NOTES
Assessment/Plan:    No problem-specific Assessment & Plan notes found for this encounter  Diagnoses and all orders for this visit:    Type 2 diabetes mellitus with diabetic polyneuropathy, unspecified whether long term insulin use ([facility] )  Comments:  HGBA! C 11 3 pt did start U500 which was increased to 50 IU bid today and Lantus increased to 50 IU daily  Orders:  -     Insulin Regular Human, Conc, (HUMULIN R U-500 KWIKPEN) 500 units/mL CONCENTRATED U-500 injection pen; Inject 50 Units under the skin 2 (two) times a day before meals    Essential hypertension  Comments:  Bp 140/80 will cont Lisinopril    Encounter for diabetic foot exam ([facility] )  Comments:  completed today  Orders:  -     Diabetic foot exam; Future    Localized swelling of both lower legs  Comments:  Pt continues on Lasix 40mg daily and kdur 40meq  Orders:  -     furosemide (LASIX) 40 mg tablet; Take 1 tablet (40 mg total) by mouth daily    Type 2 diabetes mellitus without complication, unspecified whether long term insulin use (HCC)  -     insulin glargine (LANTUS) 100 units/mL subcutaneous injection; Inject 20 Units under the skin daily Inject 50 units daily    Callous ulcer, limited to breakdown of skin ([facility]CC)  Comments:  Pt enc to f/u with Podiatry          Subjective:      Patient ID: Deisy Baig is a 62 y o  male  Diabetes Mellitus  Patient presents for follow up of diabetes  Current symptoms include: hyperglycemia  Symptoms have been stabilized   Patient denies foot ulcerations, nausea, visual disturbances and weight loss  Evaluation to date has included: fasting blood sugar, fasting lipid panel, hemoglobin A1C and microalbuminuria  Home sugars: BGs range between 198 and 300    Current treatment: Increased dose of U500 and Lantus insulin which has been not very effective but recently started new insulin approx 1 week ago, HgbA1C 11 3  Last dilated eye exam:           The following portions of the patient's history were reviewed and updated as appropriate: He  has no past medical history on file  He   Patient Active Problem List    Diagnosis Date Noted    Bilateral lower extremity edema 11/27/2018    Callous ulcer, limited to breakdown of skin ([place] [address] ) 10/30/2018    Non-intractable cyclical vomiting with nausea 10/04/2018    Fatigue due to exposure 04/14/2017    Essential hypertension 04/13/2017    Hyperlipidemia associated with type 2 diabetes mellitus ([place] [address] ) 04/13/2017    Methadone maintenance therapy patient ([place] [address] ) 04/13/2017    Type 2 diabetes mellitus with diabetic polyneuropathy ([place] [address] ) 04/13/2017    Chronic hepatitis C without hepatic coma ([place] [address] ) 02/27/2017     He  has no past surgical history on file  His family history includes Diabetes in his mother; Hypertension in his father  He  reports that he has quit smoking  He has never used smokeless tobacco  He reports that he does not drink alcohol or use drugs    Current Outpatient Medications   Medication Sig Dispense Refill    furosemide (LASIX) 40 mg tablet Take 1 tablet (40 mg total) by mouth daily 90 tablet 3    insulin glargine (LANTUS) 100 units/mL subcutaneous injection Inject 20 Units under the skin daily Inject 50 units daily 10 mL 5    Insulin Pen Needle 31G X 5 MM MISC by Does not apply route daily Pt to inject 4 X daily 100 each 5    Insulin Pen Needle 31G X 5 MM MISC 30 units sq 2X daily 100 each 3    Insulin Regular Human, Conc, (HUMULIN R U-500 KWIKPEN) 500 units/mL CONCENTRATED U-500 injection pen Inject 50 Units under the skin 2 (two) times a day before meals 5 pen 5    lisinopril (ZESTRIL) 40 mg tablet Take 1 tablet (40 mg total) by mouth daily 90 tablet 3    methadone (DOLOPHINE) 10 MG/5ML solution Take 70 mg by mouth      omeprazole (PriLOSEC) 20 mg delayed release capsule Take 1 capsule (20 mg total) by mouth daily 90 capsule 3    potassium chloride (K-DUR,KLOR-CON) 20 mEq tablet Take 2 tablets (40 mEq total) by mouth daily 180 tablet 3    pravastatin (PRAVACHOL) 20 mg tablet Take 1 tablet (20 mg total) by mouth daily 90 tablet 1     No current facility-administered medications for this visit  Current Outpatient Medications on File Prior to Visit   Medication Sig    Insulin Pen Needle 31G X 5 MM MISC by Does not apply route daily Pt to inject 4 X daily    Insulin Pen Needle 31G X 5 MM MISC 30 units sq 2X daily    lisinopril (ZESTRIL) 40 mg tablet Take 1 tablet (40 mg total) by mouth daily    methadone (DOLOPHINE) 10 MG/5ML solution Take 70 mg by mouth    omeprazole (PriLOSEC) 20 mg delayed release capsule Take 1 capsule (20 mg total) by mouth daily    potassium chloride (K-DUR,KLOR-CON) 20 mEq tablet Take 2 tablets (40 mEq total) by mouth daily    pravastatin (PRAVACHOL) 20 mg tablet Take 1 tablet (20 mg total) by mouth daily    [DISCONTINUED] furosemide (LASIX) 20 mg tablet Take 2 tablets (40 mg total) by mouth daily    [DISCONTINUED] Insulin Regular Human, Conc, (HUMULIN R U-500 KWIKPEN) 500 units/mL CONCENTRATED U-500 injection pen Inject 30 Units under the skin 2 (two) times a day before meals    [DISCONTINUED] LANTUS 100 UNIT/ML subcutaneous injection INJECT 20 UNITS SUBCUTANEOUSLY ONCE DAILY     No current facility-administered medications on file prior to visit  He has No Known Allergies       Lab Results   Component Value Date    HGBA1C 11 3 (H) 02/26/2019     Lab Results   Component Value Date    GLUF 305 (H) 02/26/2019       Results Reviewed     None          Review of Systems   Constitutional: Negative  HENT: Negative  Eyes: Negative  Respiratory: Negative  Cardiovascular: Negative  Gastrointestinal: Negative  Endocrine: Negative  Genitourinary: Negative  Musculoskeletal: Negative  Skin: Negative  Allergic/Immunologic: Negative  Neurological: Negative  Hematological: Negative  Psychiatric/Behavioral: Negative            Objective:      /80 (BP Location: Left arm, Patient Position: Sitting, Cuff Size: Adult)   Pulse 94   Temp 97 9 °F (36 6 °C) (Tympanic)   Ht 5' 8" (1 727 m)   Wt 93 3 kg (205 lb 9 6 oz)   SpO2 99%   BMI 31 26 kg/m²          Physical Exam   Constitutional: He is oriented to person, place, and time  He appears well-developed and well-nourished  HENT:   Head: Normocephalic  Eyes: Pupils are equal, round, and reactive to light  Conjunctivae and EOM are normal    Neck: Normal range of motion  Neck supple  Cardiovascular: Normal rate and regular rhythm  Pulses are weak pulses  Pulses:       Dorsalis pedis pulses are 1+ on the right side, and 1+ on the left side  Posterior tibial pulses are 1+ on the right side, and 1+ on the left side  Pulmonary/Chest: Effort normal and breath sounds normal    Abdominal: Soft  Bowel sounds are normal    Musculoskeletal: Normal range of motion  Feet:    Feet:   Right Foot:   Skin Integrity: Positive for callus and dry skin  Left Foot:   Skin Integrity: Positive for callus and dry skin  Neurological: He is alert and oriented to person, place, and time  Skin: Skin is warm and dry  Psychiatric: He has a normal mood and affect  His behavior is normal  Judgment and thought content normal        Patient's shoes and socks removed  Right Foot/Ankle   Right Foot Inspection  Skin Exam: dry skin, callus and callus                          Toe Exam: ROM and strength within normal limits  Sensory   Vibration: diminished  Proprioception: diminished   Monofilament testing: diminished  Vascular  Capillary refills: elevated  The right DP pulse is 1+  The right PT pulse is 1+  Left Foot/Ankle  Left Foot Inspection  Skin Exam: dry skin and callus                         Toe Exam: ROM and strength within normal limits                   Sensory   Vibration: diminished  Proprioception: diminished  Monofilament: diminished  Vascular  Capillary refills: elevated  The left DP pulse is 1+  The left PT pulse is 1+     Assign Risk Category:  Deformity present;  Loss of protective sensation; Weak pulses       Risk: 2

## 2019-03-14 ENCOUNTER — OFFICE VISIT (OUTPATIENT)
Dept: FAMILY MEDICINE CLINIC | Facility: CLINIC | Age: 59
End: 2019-03-14
Payer: COMMERCIAL

## 2019-03-14 VITALS
DIASTOLIC BLOOD PRESSURE: 90 MMHG | HEART RATE: 102 BPM | SYSTOLIC BLOOD PRESSURE: 176 MMHG | TEMPERATURE: 99.3 F | HEIGHT: 68 IN | WEIGHT: 207.8 LBS | OXYGEN SATURATION: 97 % | BODY MASS INDEX: 31.49 KG/M2

## 2019-03-14 DIAGNOSIS — E11.42 TYPE 2 DIABETES MELLITUS WITH DIABETIC POLYNEUROPATHY, WITHOUT LONG-TERM CURRENT USE OF INSULIN (HCC): ICD-10-CM

## 2019-03-14 DIAGNOSIS — I10 ESSENTIAL HYPERTENSION: Primary | ICD-10-CM

## 2019-03-14 LAB — SL AMB POCT GLUCOSE BLD: 250

## 2019-03-14 PROCEDURE — 99214 OFFICE O/P EST MOD 30 MIN: CPT | Performed by: NURSE PRACTITIONER

## 2019-03-14 PROCEDURE — 3008F BODY MASS INDEX DOCD: CPT | Performed by: NURSE PRACTITIONER

## 2019-03-14 PROCEDURE — 82948 REAGENT STRIP/BLOOD GLUCOSE: CPT | Performed by: NURSE PRACTITIONER

## 2019-03-14 PROCEDURE — 1036F TOBACCO NON-USER: CPT | Performed by: NURSE PRACTITIONER

## 2019-03-14 RX ORDER — ERYTHROMYCIN 5 MG/G
0.5 OINTMENT OPHTHALMIC
COMMUNITY
End: 2021-02-25 | Stop reason: HOSPADM

## 2019-03-14 RX ORDER — ISOSORBIDE MONONITRATE 120 MG/1
120 TABLET, EXTENDED RELEASE ORAL DAILY
Qty: 90 TABLET | Refills: 1 | Status: SHIPPED | OUTPATIENT
Start: 2019-03-14 | End: 2019-05-28 | Stop reason: SDUPTHER

## 2019-04-02 ENCOUNTER — OFFICE VISIT (OUTPATIENT)
Dept: FAMILY MEDICINE CLINIC | Facility: CLINIC | Age: 59
End: 2019-04-02
Payer: COMMERCIAL

## 2019-04-02 VITALS
SYSTOLIC BLOOD PRESSURE: 140 MMHG | WEIGHT: 213.8 LBS | TEMPERATURE: 98.7 F | HEART RATE: 88 BPM | BODY MASS INDEX: 32.4 KG/M2 | OXYGEN SATURATION: 99 % | DIASTOLIC BLOOD PRESSURE: 80 MMHG | HEIGHT: 68 IN

## 2019-04-02 DIAGNOSIS — E11.42 TYPE 2 DIABETES MELLITUS WITH DIABETIC POLYNEUROPATHY, UNSPECIFIED WHETHER LONG TERM INSULIN USE (HCC): Primary | ICD-10-CM

## 2019-04-02 DIAGNOSIS — R60.0 BILATERAL LOWER EXTREMITY EDEMA: ICD-10-CM

## 2019-04-02 DIAGNOSIS — I10 ESSENTIAL HYPERTENSION: ICD-10-CM

## 2019-04-02 LAB — SL AMB POCT GLUCOSE BLD: 384

## 2019-04-02 PROCEDURE — 82948 REAGENT STRIP/BLOOD GLUCOSE: CPT | Performed by: NURSE PRACTITIONER

## 2019-04-02 PROCEDURE — 3008F BODY MASS INDEX DOCD: CPT | Performed by: NURSE PRACTITIONER

## 2019-04-02 PROCEDURE — 1036F TOBACCO NON-USER: CPT | Performed by: NURSE PRACTITIONER

## 2019-04-02 PROCEDURE — 99214 OFFICE O/P EST MOD 30 MIN: CPT | Performed by: NURSE PRACTITIONER

## 2019-05-24 ENCOUNTER — APPOINTMENT (OUTPATIENT)
Dept: LAB | Facility: CLINIC | Age: 59
End: 2019-05-24
Payer: COMMERCIAL

## 2019-05-24 DIAGNOSIS — E11.42 TYPE 2 DIABETES MELLITUS WITH DIABETIC POLYNEUROPATHY, UNSPECIFIED WHETHER LONG TERM INSULIN USE (HCC): ICD-10-CM

## 2019-05-24 LAB
EST. AVERAGE GLUCOSE BLD GHB EST-MCNC: 212 MG/DL
GLUCOSE P FAST SERPL-MCNC: 138 MG/DL (ref 65–99)
HBA1C MFR BLD: 9 % (ref 4.2–6.3)

## 2019-05-24 PROCEDURE — 82947 ASSAY GLUCOSE BLOOD QUANT: CPT

## 2019-05-24 PROCEDURE — 36415 COLL VENOUS BLD VENIPUNCTURE: CPT

## 2019-05-24 PROCEDURE — 83036 HEMOGLOBIN GLYCOSYLATED A1C: CPT

## 2019-05-28 ENCOUNTER — OFFICE VISIT (OUTPATIENT)
Dept: FAMILY MEDICINE CLINIC | Facility: CLINIC | Age: 59
End: 2019-05-28
Payer: COMMERCIAL

## 2019-05-28 VITALS
WEIGHT: 213.6 LBS | BODY MASS INDEX: 32.37 KG/M2 | HEIGHT: 68 IN | HEART RATE: 94 BPM | OXYGEN SATURATION: 96 % | DIASTOLIC BLOOD PRESSURE: 90 MMHG | TEMPERATURE: 100.4 F | SYSTOLIC BLOOD PRESSURE: 128 MMHG

## 2019-05-28 DIAGNOSIS — E11.42 TYPE 2 DIABETES MELLITUS WITH DIABETIC POLYNEUROPATHY, UNSPECIFIED WHETHER LONG TERM INSULIN USE (HCC): Primary | ICD-10-CM

## 2019-05-28 DIAGNOSIS — E11.69 HYPERLIPIDEMIA ASSOCIATED WITH TYPE 2 DIABETES MELLITUS (HCC): ICD-10-CM

## 2019-05-28 DIAGNOSIS — R22.43 LOCALIZED SWELLING OF BOTH LOWER LEGS: ICD-10-CM

## 2019-05-28 DIAGNOSIS — E11.9 TYPE 2 DIABETES MELLITUS WITHOUT COMPLICATION, UNSPECIFIED WHETHER LONG TERM INSULIN USE (HCC): ICD-10-CM

## 2019-05-28 DIAGNOSIS — R11.2 NAUSEA AND VOMITING, INTRACTABILITY OF VOMITING NOT SPECIFIED, UNSPECIFIED VOMITING TYPE: ICD-10-CM

## 2019-05-28 DIAGNOSIS — I10 ESSENTIAL HYPERTENSION: ICD-10-CM

## 2019-05-28 DIAGNOSIS — E78.5 HYPERLIPIDEMIA ASSOCIATED WITH TYPE 2 DIABETES MELLITUS (HCC): ICD-10-CM

## 2019-05-28 PROCEDURE — 3008F BODY MASS INDEX DOCD: CPT | Performed by: NURSE PRACTITIONER

## 2019-05-28 PROCEDURE — 1036F TOBACCO NON-USER: CPT | Performed by: NURSE PRACTITIONER

## 2019-05-28 PROCEDURE — 99214 OFFICE O/P EST MOD 30 MIN: CPT | Performed by: NURSE PRACTITIONER

## 2019-05-28 RX ORDER — POTASSIUM CHLORIDE 20 MEQ/1
40 TABLET, EXTENDED RELEASE ORAL DAILY
Qty: 180 TABLET | Refills: 3 | Status: SHIPPED | OUTPATIENT
Start: 2019-05-28 | End: 2021-02-25 | Stop reason: HOSPADM

## 2019-05-28 RX ORDER — ISOSORBIDE MONONITRATE 120 MG/1
120 TABLET, EXTENDED RELEASE ORAL DAILY
Qty: 90 TABLET | Refills: 3 | Status: SHIPPED | OUTPATIENT
Start: 2019-05-28 | End: 2020-06-05 | Stop reason: SDUPTHER

## 2019-05-28 RX ORDER — INSULIN GLARGINE 100 [IU]/ML
60 INJECTION, SOLUTION SUBCUTANEOUS DAILY
Qty: 10 ML | Refills: 5 | Status: SHIPPED | OUTPATIENT
Start: 2019-05-28 | End: 2019-09-25 | Stop reason: ALTCHOICE

## 2019-05-28 RX ORDER — OMEPRAZOLE 20 MG/1
20 CAPSULE, DELAYED RELEASE ORAL DAILY
Qty: 90 CAPSULE | Refills: 3 | Status: SHIPPED | OUTPATIENT
Start: 2019-05-28 | End: 2019-08-08 | Stop reason: SDUPTHER

## 2019-05-28 RX ORDER — FUROSEMIDE 40 MG/1
40 TABLET ORAL DAILY
Qty: 90 TABLET | Refills: 3 | Status: SHIPPED | OUTPATIENT
Start: 2019-05-28 | End: 2021-03-04 | Stop reason: HOSPADM

## 2019-05-28 RX ORDER — PRAVASTATIN SODIUM 20 MG
20 TABLET ORAL DAILY
Qty: 90 TABLET | Refills: 3 | Status: SHIPPED | OUTPATIENT
Start: 2019-05-28 | End: 2020-03-12 | Stop reason: SDUPTHER

## 2019-07-02 ENCOUNTER — TELEPHONE (OUTPATIENT)
Dept: FAMILY MEDICINE CLINIC | Facility: CLINIC | Age: 59
End: 2019-07-02

## 2019-07-02 DIAGNOSIS — E11.65 UNCONTROLLED TYPE 2 DIABETES MELLITUS WITH HYPERGLYCEMIA (HCC): Primary | ICD-10-CM

## 2019-07-02 NOTE — TELEPHONE ENCOUNTER
Pt called sugars are still uncontrolled  He is unable to continue working like this  He asked for FMLA forms  I also placed a referral for endo  He is willing to see endo and I told him we will complete his forms to let us know when his endo apt is

## 2019-08-06 ENCOUNTER — TRANSCRIBE ORDERS (OUTPATIENT)
Dept: LAB | Facility: CLINIC | Age: 59
End: 2019-08-06

## 2019-08-06 ENCOUNTER — APPOINTMENT (OUTPATIENT)
Dept: LAB | Facility: CLINIC | Age: 59
End: 2019-08-06
Payer: COMMERCIAL

## 2019-08-06 DIAGNOSIS — E11.9 TYPE 2 DIABETES MELLITUS WITHOUT COMPLICATION, UNSPECIFIED WHETHER LONG TERM INSULIN USE (HCC): ICD-10-CM

## 2019-08-06 LAB
EST. AVERAGE GLUCOSE BLD GHB EST-MCNC: 232 MG/DL
GLUCOSE P FAST SERPL-MCNC: 135 MG/DL (ref 65–99)
HBA1C MFR BLD: 9.7 % (ref 4.2–6.3)

## 2019-08-06 PROCEDURE — 83036 HEMOGLOBIN GLYCOSYLATED A1C: CPT

## 2019-08-06 PROCEDURE — 82947 ASSAY GLUCOSE BLOOD QUANT: CPT

## 2019-08-06 PROCEDURE — 36415 COLL VENOUS BLD VENIPUNCTURE: CPT

## 2019-08-08 ENCOUNTER — OFFICE VISIT (OUTPATIENT)
Dept: FAMILY MEDICINE CLINIC | Facility: CLINIC | Age: 59
End: 2019-08-08
Payer: COMMERCIAL

## 2019-08-08 VITALS
DIASTOLIC BLOOD PRESSURE: 88 MMHG | BODY MASS INDEX: 32.28 KG/M2 | HEIGHT: 68 IN | TEMPERATURE: 98.5 F | HEART RATE: 85 BPM | WEIGHT: 213 LBS | SYSTOLIC BLOOD PRESSURE: 128 MMHG | OXYGEN SATURATION: 98 %

## 2019-08-08 DIAGNOSIS — I10 ESSENTIAL HYPERTENSION: ICD-10-CM

## 2019-08-08 DIAGNOSIS — E11.42 TYPE 2 DIABETES MELLITUS WITH DIABETIC POLYNEUROPATHY, UNSPECIFIED WHETHER LONG TERM INSULIN USE (HCC): Primary | ICD-10-CM

## 2019-08-08 DIAGNOSIS — R60.0 BILATERAL LOWER EXTREMITY EDEMA: ICD-10-CM

## 2019-08-08 DIAGNOSIS — B18.2 CHRONIC HEPATITIS C WITHOUT HEPATIC COMA (HCC): ICD-10-CM

## 2019-08-08 DIAGNOSIS — R11.2 NAUSEA AND VOMITING, INTRACTABILITY OF VOMITING NOT SPECIFIED, UNSPECIFIED VOMITING TYPE: ICD-10-CM

## 2019-08-08 DIAGNOSIS — Z13.31 POSITIVE DEPRESSION SCREENING: ICD-10-CM

## 2019-08-08 DIAGNOSIS — E11.42 DIABETIC POLYNEUROPATHY ASSOCIATED WITH TYPE 2 DIABETES MELLITUS (HCC): ICD-10-CM

## 2019-08-08 DIAGNOSIS — E11.9 ENCOUNTER FOR DIABETIC FOOT EXAM (HCC): ICD-10-CM

## 2019-08-08 DIAGNOSIS — E66.9 OBESITY (BMI 30-39.9): ICD-10-CM

## 2019-08-08 PROCEDURE — 3008F BODY MASS INDEX DOCD: CPT | Performed by: NURSE PRACTITIONER

## 2019-08-08 PROCEDURE — 99214 OFFICE O/P EST MOD 30 MIN: CPT | Performed by: NURSE PRACTITIONER

## 2019-08-08 PROCEDURE — 3074F SYST BP LT 130 MM HG: CPT | Performed by: NURSE PRACTITIONER

## 2019-08-08 PROCEDURE — 1036F TOBACCO NON-USER: CPT | Performed by: NURSE PRACTITIONER

## 2019-08-08 RX ORDER — GABAPENTIN 100 MG/1
100 CAPSULE ORAL 3 TIMES DAILY
Qty: 90 CAPSULE | Refills: 3 | Status: SHIPPED | OUTPATIENT
Start: 2019-08-08 | End: 2020-03-31

## 2019-08-08 RX ORDER — OMEPRAZOLE 20 MG/1
20 CAPSULE, DELAYED RELEASE ORAL DAILY
Qty: 90 CAPSULE | Refills: 3 | Status: SHIPPED | OUTPATIENT
Start: 2019-08-08 | End: 2020-06-05 | Stop reason: SDUPTHER

## 2019-08-08 NOTE — PROGRESS NOTES
Assessment/Plan:    No problem-specific Assessment & Plan notes found for this encounter  Diagnoses and all orders for this visit:    Type 2 diabetes mellitus with diabetic polyneuropathy, unspecified whether long term insulin use (HCC)  Comments:  HgbA1C 9 7  pt is using Lantus and U500 will add Invokana  Orders:  -     Diabetic foot exam; Future  -     canagliflozin (INVOKANA) 100 mg; Take 1 tablet (100 mg total) by mouth daily before breakfast  -     Hemoglobin A1C; Future  -     Glucose, fasting; Future  -     Ambulatory referral to Diabetic Education; Future  -     Diabetic foot exam; Future    Diabetic polyneuropathy associated with type 2 diabetes mellitus ([facility] )  Comments:  Gabapentin 100mg tid initiated  Orders:  -     gabapentin (NEURONTIN) 100 mg capsule; Take 1 capsule (100 mg total) by mouth 3 (three) times a day  -     Ambulatory referral to Diabetic Education; Future    Essential hypertension  Comments:  bp 128/88 pt managed on Imdur     Chronic hepatitis C without hepatic coma (HCC)    Bilateral lower extremity edema  Comments:  Pt is using Lasix as needed    Nausea and vomiting, intractability of vomiting not specified, unspecified vomiting type  Comments:  pt reports no incidence of n/v today with use of Omeprazole  Orders:  -     omeprazole (PriLOSEC) 20 mg delayed release capsule; Take 1 capsule (20 mg total) by mouth daily    Obesity (BMI 30-39  9)  Comments:  Pt referred to diabetes diet education    Positive depression screening  Comments:  Pt declined any counseling or medication    Encounter for diabetic foot exam ([place] [id number] )  Comments:  Completed today          Subjective:      Patient ID: Karo Quiñones is a 62 y o  male  Diabetes Mellitus  Patient presents with new onset of Type 2 diabetes  Current symptoms include: hyperglycemia pain and burning both feet    Symptoms have been intermittent  Patient denies weight loss   Evaluation to date has included: fasting blood sugar, fasting lipid panel, hemoglobin A1C and microalbuminuria  Home sugars: BGs are running  consistent with Hgb A1C  Current treatment: Pt started U500 60 IU and Lantus  Pt c/o bilateral lower extremity burning and pain associated with Diabetic Neuropathy  Pt works on his feet over night, does wear plastic tip shoes    But states over the past few weeks pain has become increasingly worse from standing all night on his feet  Pt agreeable to try Gabapentin  The following portions of the patient's history were reviewed and updated as appropriate: He  has a past medical history of Diabetes mellitus ([place] [id number] )  He   Patient Active Problem List    Diagnosis Date Noted    Diabetic polyneuropathy associated with type 2 diabetes mellitus ([place] [address] ) 08/08/2019    Obesity (BMI 30-39 9) 08/08/2019    Positive depression screening 08/08/2019    Bilateral lower extremity edema 11/27/2018    Callous ulcer, limited to breakdown of skin ([place] [id number] ) 10/30/2018    Non-intractable cyclical vomiting with nausea 10/04/2018    Fatigue due to exposure 04/14/2017    Essential hypertension 04/13/2017    Hyperlipidemia associated with type 2 diabetes mellitus ([place] [address] ) 04/13/2017    Methadone maintenance therapy patient ([place] [id number] ) 04/13/2017    Type 2 diabetes mellitus with diabetic polyneuropathy ([place] [id number] ) 04/13/2017    Chronic hepatitis C without hepatic coma ([place] [address] ) 02/27/2017     He  has no past surgical history on file  His family history includes Diabetes in his mother; Hypertension in his father  He  reports that he has quit smoking  He has never used smokeless tobacco  He reports that he does not drink alcohol or use drugs    Current Outpatient Medications   Medication Sig Dispense Refill    erythromycin (ILOTYCIN) ophthalmic ointment 0 5 inches daily at bedtime      furosemide (LASIX) 40 mg tablet Take 1 tablet (40 mg total) by mouth daily 90 tablet 3    insulin glargine (LANTUS) 100 units/mL subcutaneous injection Inject 60 Units under the skin daily Inject 50 units daily 10 mL 5    Insulin Pen Needle 31G X 5 MM MISC by Does not apply route daily Pt to inject 4 X daily 100 each 5    Insulin Pen Needle 31G X 5 MM MISC 30 units sq 2X daily 100 each 3    Insulin Regular Human, Conc, (HUMULIN R U-500 KWIKPEN) 500 units/mL CONCENTRATED U-500 injection pen Inject 60 Units under the skin 2 (two) times a day before meals 5 pen 5    isosorbide mononitrate (IMDUR) 120 mg 24 hr tablet Take 1 tablet (120 mg total) by mouth daily 90 tablet 3    methadone (DOLOPHINE) 10 MG/5ML solution Take 70 mg by mouth      omeprazole (PriLOSEC) 20 mg delayed release capsule Take 1 capsule (20 mg total) by mouth daily 90 capsule 3    potassium chloride (K-DUR,KLOR-CON) 20 mEq tablet Take 2 tablets (40 mEq total) by mouth daily 180 tablet 3    pravastatin (PRAVACHOL) 20 mg tablet Take 1 tablet (20 mg total) by mouth daily 90 tablet 3    canagliflozin (INVOKANA) 100 mg Take 1 tablet (100 mg total) by mouth daily before breakfast 30 tablet 3    gabapentin (NEURONTIN) 100 mg capsule Take 1 capsule (100 mg total) by mouth 3 (three) times a day 90 capsule 3     No current facility-administered medications for this visit        Current Outpatient Medications on File Prior to Visit   Medication Sig    erythromycin (ILOTYCIN) ophthalmic ointment 0 5 inches daily at bedtime    furosemide (LASIX) 40 mg tablet Take 1 tablet (40 mg total) by mouth daily    insulin glargine (LANTUS) 100 units/mL subcutaneous injection Inject 60 Units under the skin daily Inject 50 units daily    Insulin Pen Needle 31G X 5 MM MISC by Does not apply route daily Pt to inject 4 X daily    Insulin Pen Needle 31G X 5 MM MISC 30 units sq 2X daily    Insulin Regular Human, Conc, (HUMULIN R U-500 KWIKPEN) 500 units/mL CONCENTRATED U-500 injection pen Inject 60 Units under the skin 2 (two) times a day before meals    isosorbide mononitrate (IMDUR) 120 mg 24 hr tablet Take 1 tablet (120 mg total) by mouth daily    methadone (DOLOPHINE) 10 MG/5ML solution Take 70 mg by mouth    potassium chloride (K-DUR,KLOR-CON) 20 mEq tablet Take 2 tablets (40 mEq total) by mouth daily    pravastatin (PRAVACHOL) 20 mg tablet Take 1 tablet (20 mg total) by mouth daily    [DISCONTINUED] omeprazole (PriLOSEC) 20 mg delayed release capsule Take 1 capsule (20 mg total) by mouth daily     No current facility-administered medications on file prior to visit  He is allergic to lisinopril       Review of Systems   Constitutional: Negative  HENT: Negative  Eyes: Negative  Respiratory: Negative  Cardiovascular: Negative  Gastrointestinal: Negative  Endocrine: Negative  Genitourinary: Negative  Musculoskeletal: Negative  Skin: Negative  Allergic/Immunologic: Negative  Neurological: Positive for numbness  Pain and burning both feet   Hematological: Negative  Psychiatric/Behavioral: Negative  BMI Counseling: Body mass index is 32 39 kg/m²  Discussed the patient's BMI with him  The BMI is above average  BMI counseling and education was provided to the patient  Nutrition recommendations include reducing portion sizes, decreasing overall calorie intake, 3-5 servings of fruits/vegetables daily, reducing fast food intake, consuming healthier snacks, decreasing soda and/or juice intake, moderation in carbohydrate intake, increasing intake of lean protein, reducing intake of saturated fat and trans fat and reducing intake of cholesterol  Exercise recommendations include exercising 3-5 times per week     PHQ-9 Depression Screening    PHQ-9:    Frequency of the following problems over the past two weeks:       Little interest or pleasure in doing things:  0 - not at all  Feeling down, depressed, or hopeless:  2 - more than half the days  Trouble falling or staying asleep, or sleeping too much:  1 - several days  Feeling tired or having little energy:  1 - several days  Poor appetite or overeatin - several days  Feeling bad about yourself - or that you are a failure or have let yourself or your family down:  0 - not at all  Trouble concentrating on things, such as reading the newspaper or watching television:  0 - not at all  Moving or speaking so slowly that other people could have noticed  Or the opposite - being so fidgety or restless that you have been moving around a lot more than usual:  0 - not at all  Thoughts that you would be better off dead, or of hurting yourself in some way:  0 - not at all  PHQ-2 Score:  2  PHQ-9 Score:  5            Objective:      /88   Pulse 85   Temp 98 5 °F (36 9 °C) (Tympanic)   Ht 5' 8" (1 727 m)   Wt 96 6 kg (213 lb)   SpO2 98%   BMI 32 39 kg/m²          Physical Exam   Constitutional: He is oriented to person, place, and time  He appears well-developed and well-nourished  HENT:   Head: Normocephalic  Eyes: Pupils are equal, round, and reactive to light  Conjunctivae and EOM are normal    Neck: Normal range of motion  Neck supple  Cardiovascular: Normal rate and regular rhythm  Pulses are no weak pulses  Pulses:       Dorsalis pedis pulses are 2+ on the right side, and 2+ on the left side  Posterior tibial pulses are 2+ on the right side, and 2+ on the left side  Pulmonary/Chest: Effort normal and breath sounds normal    Abdominal: Soft  Bowel sounds are normal    Musculoskeletal: Normal range of motion  Feet:   Right Foot:   Skin Integrity: Positive for callus  Left Foot:   Skin Integrity: Positive for callus  Neurological: He is alert and oriented to person, place, and time  A sensory deficit is present  He exhibits abnormal muscle tone  Pt reports bilateral lower extremity pain    Skin: Skin is warm and dry  Psychiatric: He has a normal mood and affect   His behavior is normal  Judgment and thought content normal        PHQ-9 Depression Screening    PHQ-9:    Frequency of the following problems over the past two weeks:       Little interest or pleasure in doing things:  0 - not at all  Feeling down, depressed, or hopeless:  2 - more than half the days  Trouble falling or staying asleep, or sleeping too much:  1 - several days  Feeling tired or having little energy:  1 - several days  Poor appetite or overeatin - several days  Feeling bad about yourself - or that you are a failure or have let yourself or your family down:  0 - not at all  Trouble concentrating on things, such as reading the newspaper or watching television:  0 - not at all  Moving or speaking so slowly that other people could have noticed  Or the opposite - being so fidgety or restless that you have been moving around a lot more than usual:  0 - not at all  Thoughts that you would be better off dead, or of hurting yourself in some way:  0 - not at all  PHQ-2 Score:  2  PHQ-9 Score:  5       Depression Screening Follow-up Plan: Patient's depression screening was positive with a PHQ-2 score of 2  Their PHQ-9 score was 5  Patient assessed for underlying major depression  They have no active suicidal ideations  Brief counseling provided and recommend additional follow-up/re-evaluation next office visit  Patient's shoes and socks removed  Right Foot/Ankle   Right Foot Inspection  Skin Exam: callus and callus                          Toe Exam: ROM and strength within normal limits  Sensory   Vibration: diminished  Proprioception: diminished   Monofilament testing: diminished  Vascular  Capillary refills: < 3 seconds  The right DP pulse is 2+  The right PT pulse is 2+  Left Foot/Ankle  Left Foot Inspection  Skin Exam: callus                         Toe Exam: ROM and strength within normal limits                   Sensory   Vibration: diminished  Proprioception: diminished  Monofilament: diminished  Vascular  Capillary refills: < 3 seconds  The left DP pulse is 2+  The left PT pulse is 2+  Assign Risk Category:  Deformity present; Loss of protective sensation;  No weak pulses       Risk: 2

## 2019-08-09 ENCOUNTER — TELEPHONE (OUTPATIENT)
Dept: FAMILY MEDICINE CLINIC | Facility: CLINIC | Age: 59
End: 2019-08-09

## 2019-08-09 NOTE — TELEPHONE ENCOUNTER
Referral for DSMT program and initial MNT received from [facility]  Pt will need to verify insurance coverage    Called and spoke to [address] as pt is sleeping  (works nights)  She is aware of the referral (also is diabetic)  Given information to check for insurance coverage  , To call me back once he has that information and will arrange appts

## 2019-08-20 NOTE — TELEPHONE ENCOUNTER
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

## 2019-09-02 ENCOUNTER — TELEPHONE (OUTPATIENT)
Dept: OTHER | Facility: OTHER | Age: 59
End: 2019-09-02

## 2019-09-18 DIAGNOSIS — I10 ESSENTIAL HYPERTENSION: ICD-10-CM

## 2019-09-19 RX ORDER — ISOSORBIDE MONONITRATE 120 MG/1
TABLET, EXTENDED RELEASE ORAL
Qty: 90 TABLET | Refills: 1 | Status: SHIPPED | OUTPATIENT
Start: 2019-09-19 | End: 2020-03-12 | Stop reason: ALTCHOICE

## 2019-09-25 ENCOUNTER — CONSULT (OUTPATIENT)
Dept: ENDOCRINOLOGY | Facility: CLINIC | Age: 59
End: 2019-09-25
Payer: COMMERCIAL

## 2019-09-25 VITALS
HEIGHT: 68 IN | BODY MASS INDEX: 31.98 KG/M2 | DIASTOLIC BLOOD PRESSURE: 80 MMHG | HEART RATE: 89 BPM | WEIGHT: 211 LBS | SYSTOLIC BLOOD PRESSURE: 138 MMHG

## 2019-09-25 DIAGNOSIS — E11.42 TYPE 2 DIABETES MELLITUS WITH DIABETIC POLYNEUROPATHY, UNSPECIFIED WHETHER LONG TERM INSULIN USE (HCC): ICD-10-CM

## 2019-09-25 DIAGNOSIS — E11.69 HYPERLIPIDEMIA ASSOCIATED WITH TYPE 2 DIABETES MELLITUS (HCC): ICD-10-CM

## 2019-09-25 DIAGNOSIS — E78.5 HYPERLIPIDEMIA ASSOCIATED WITH TYPE 2 DIABETES MELLITUS (HCC): ICD-10-CM

## 2019-09-25 DIAGNOSIS — I10 ESSENTIAL HYPERTENSION: ICD-10-CM

## 2019-09-25 DIAGNOSIS — E66.9 OBESITY (BMI 30-39.9): ICD-10-CM

## 2019-09-25 DIAGNOSIS — E11.65 UNCONTROLLED TYPE 2 DIABETES MELLITUS WITH HYPERGLYCEMIA (HCC): Primary | ICD-10-CM

## 2019-09-25 LAB — SL AMB POCT GLUCOSE BLD: 336

## 2019-09-25 PROCEDURE — 82948 REAGENT STRIP/BLOOD GLUCOSE: CPT | Performed by: INTERNAL MEDICINE

## 2019-09-25 PROCEDURE — 99244 OFF/OP CNSLTJ NEW/EST MOD 40: CPT | Performed by: INTERNAL MEDICINE

## 2019-09-25 RX ORDER — METFORMIN HYDROCHLORIDE 500 MG/1
500 TABLET, EXTENDED RELEASE ORAL 2 TIMES DAILY WITH MEALS
Qty: 60 TABLET | Refills: 3 | Status: SHIPPED | OUTPATIENT
Start: 2019-09-25 | End: 2019-12-05

## 2019-09-25 NOTE — PATIENT INSTRUCTIONS
Hypoglycemia instructions   Kwaku Terry  9/25/2019  [id number]    Low Blood Sugar    Steps to treat low blood sugar  1  Test blood sugar if you have symptoms of low blood sugar:   Low Blood Sugar Symptoms:  o Sweaty  o Dizzy  o Rapid heartbeat  o Shaky    o Bad mood  o Hungry      2  Treat blood sugar less than 70 with 15 grams of fast-acting carbohydrate:   Examples of 15 grams Fast-Acting Carbohydrate:  o 4 oz juice  o 4 oz regular soda  o 3-4 glucose tablets (chew)  o 3-4 hard candies (chew)              3    Wait 15 minutes and test your blood sugar again           4   If blood sugar is less than 100, repeat steps 2-3       5  When your blood sugar is 100 or more, eat a snack if it will be longer than one hour until your next meal  The snack should be 15 grams of carbohydrate and a protein:   Examples of snacks:  o ½ sandwich  o 6 crackers with cheese  o Piece of fruit with cheese or peanut butter  o 6 crackers with peanut butter

## 2019-09-25 NOTE — PROGRESS NOTES
New Patient Progress Note       Referring Provider  Kenyon Dunaway, [address] [facility] [address], [address]     History of Present Illness:   Rachid Brooks is a 62 y o  male with a history of type 2  diabetes with long term use of insulin since 4-5 yrs  Diabetes course has been un-stable  Reports complications of diabetes such as neuropathy   Denies recent illness or hospitalizations  Denies recent severe hypoglycemic or severe hyperglycemic episodes  Denies any issues with his current regimen  home glucose monitoring: are performed regularly, 2-3 times daily     Home blood glucose readings:   Blood sugars are in 200-250 range     Current regimen: Humalin U 500 , 50 units twice a day and Lantus 60 units at bedtime   compliant most of the timedenies any side effects from medications  C/o hypoglycemia episodes after sleep around 7 PM   He works at night shift     Diet: 3  meals per day, 1-2  snacks per day  Timing of meals is predictable  Yes  diabetic diet compliance:  compliant most of the time  Activity: Daily activity is predictable Yes The patient engages in little, if any physical activity  further diabetic ROS: no polyuria or polydipsia, no chest pain, dyspnea or TIAs, no unusual visual symptoms, no hypoglycemia, weight has increased      acute symptoms are  Tingling and numbness in feet     Opthamology: sees regularly,  no retinopathy,  Podiatry: does not see       He has history of hypertension for which his currently taking Indur 120 mg daily  For hyperlipidemia currently is managed on pravastatin 20 mg daily     Past Medical History:   Diagnosis Date    Diabetes mellitus ([facility] [address] )       History reviewed  No pertinent surgical history     Family History   Problem Relation Age of Onset    Diabetes Mother     Hypertension Father      Social History     Tobacco Use    Smoking status: Former Smoker    Smokeless tobacco: Never Used   Substance Use Topics    Alcohol use: No     Allergies   Allergen Reactions    Lisinopril Vomiting         Current Outpatient Medications:     furosemide (LASIX) 40 mg tablet, Take 1 tablet (40 mg total) by mouth daily, Disp: 90 tablet, Rfl: 3    gabapentin (NEURONTIN) 100 mg capsule, Take 1 capsule (100 mg total) by mouth 3 (three) times a day, Disp: 90 capsule, Rfl: 3    Insulin Pen Needle 31G X 5 MM MISC, by Does not apply route daily Pt to inject 4 X daily, Disp: 100 each, Rfl: 5    Insulin Pen Needle 31G X 5 MM MISC, 30 units sq 2X daily, Disp: 100 each, Rfl: 3    Insulin Regular Human, Conc, (HUMULIN R U-500 KWIKPEN) 500 units/mL CONCENTRATED U-500 injection pen, Inject 55 units with breakfast, lunch and dinner, Disp: 4 pen, Rfl: 3    isosorbide mononitrate (IMDUR) 120 mg 24 hr tablet, Take 1 tablet (120 mg total) by mouth daily, Disp: 90 tablet, Rfl: 3    methadone (DOLOPHINE) 10 MG/5ML solution, Take 70 mg by mouth, Disp: , Rfl:     omeprazole (PriLOSEC) 20 mg delayed release capsule, Take 1 capsule (20 mg total) by mouth daily, Disp: 90 capsule, Rfl: 3    potassium chloride (K-DUR,KLOR-CON) 20 mEq tablet, Take 2 tablets (40 mEq total) by mouth daily, Disp: 180 tablet, Rfl: 3    pravastatin (PRAVACHOL) 20 mg tablet, Take 1 tablet (20 mg total) by mouth daily, Disp: 90 tablet, Rfl: 3    erythromycin (ILOTYCIN) ophthalmic ointment, 0 5 inches daily at bedtime, Disp: , Rfl:     isosorbide mononitrate (IMDUR) 120 mg 24 hr tablet, TAKE 1 TABLET BY MOUTH ONCE DAILY (Patient not taking: Reported on 9/25/2019), Disp: 90 tablet, Rfl: 1    metFORMIN (GLUCOPHAGE-XR) 500 mg 24 hr tablet, Take 1 tablet (500 mg total) by mouth 2 (two) times a day with meals, Disp: 60 tablet, Rfl: 3  Review of Systems   Constitutional: Negative for activity change, diaphoresis, fatigue, fever and unexpected weight change  HENT: Negative  Eyes: Negative for visual disturbance  Respiratory: Negative for cough, chest tightness and shortness of breath      Cardiovascular: Negative for chest pain, palpitations and leg swelling  Gastrointestinal: Negative for abdominal pain, blood in stool, constipation, diarrhea, nausea and vomiting  Endocrine: Negative for cold intolerance, heat intolerance, polydipsia, polyphagia and polyuria  Genitourinary: Negative for dysuria, enuresis, frequency and urgency  Musculoskeletal: Negative for arthralgias and myalgias  Skin: Negative for pallor, rash and wound  Allergic/Immunologic: Negative  Neurological: Negative for dizziness, tremors, weakness and numbness  Hematological: Negative  Psychiatric/Behavioral: Negative  Physical Exam:  Body mass index is 32 08 kg/m²  /80   Pulse 89   Ht 5' 8" (1 727 m)   Wt 95 7 kg (211 lb)   BMI 32 08 kg/m²    Wt Readings from Last 3 Encounters:   09/25/19 95 7 kg (211 lb)   08/08/19 96 6 kg (213 lb)   05/28/19 96 9 kg (213 lb 9 6 oz)       Physical Exam   Constitutional: He is oriented to person, place, and time  He appears well-developed and well-nourished  No distress  HENT:   Head: Normocephalic and atraumatic  Eyes: Pupils are equal, round, and reactive to light  Conjunctivae and EOM are normal  Right eye exhibits no discharge  Left eye exhibits no discharge  Neck: Normal range of motion  Neck supple  No thyromegaly present  Cardiovascular: Normal rate, regular rhythm and normal heart sounds  Pulses are no weak pulses  Pulses:       Dorsalis pedis pulses are 2+ on the right side, and 2+ on the left side  Posterior tibial pulses are 2+ on the right side, and 2+ on the left side  Pulmonary/Chest: Effort normal and breath sounds normal  No respiratory distress  Abdominal: Soft  Bowel sounds are normal  He exhibits no distension  There is no tenderness  Musculoskeletal: Normal range of motion  He exhibits no edema or deformity  Feet:   Right Foot:   Skin Integrity: Positive for callus  Negative for ulcer, skin breakdown, erythema, warmth or dry skin     Left Foot: Skin Integrity: Positive for callus  Negative for ulcer, skin breakdown, erythema, warmth or dry skin  Neurological: He is alert and oriented to person, place, and time  He displays normal reflexes  Skin: Skin is warm and dry  No erythema  Psychiatric: He has a normal mood and affect  Vitals reviewed  Patient's shoes and socks removed  Right Foot/Ankle   Right Foot Inspection  Skin Exam: skin normal, skin intact, callus and callus no dry skin, no warmth, no erythema, no maceration, no abnormal color, no pre-ulcer and no ulcer                          Toe Exam: ROM and strength within normal limits  Sensory   Vibration: intact    Monofilament testing: intact  Vascular    The right DP pulse is 2+  The right PT pulse is 2+  Left Foot/Ankle  Left Foot Inspection  Skin Exam: skin normal, skin intact and callusno dry skin, no warmth, no erythema, no maceration, normal color, no pre-ulcer and no ulcer                         Toe Exam: ROM and strength within normal limits                   Sensory   Vibration: intact    Monofilament: intact  Vascular    The left DP pulse is 2+  The left PT pulse is 2+  Assign Risk Category:  Deformity present; Loss of protective sensation; No weak pulses       Risk: 2      Labs:   No components found for: HA1C  No components found for: GLU    Lab Results   Component Value Date    CREATININE 1 27 02/26/2019    CREATININE 1 05 09/29/2018    CREATININE 1 04 03/06/2018    BUN 25 02/26/2019    K 5 3 02/26/2019    CL 97 (L) 02/26/2019    CO2 28 02/26/2019     eGFR   Date Value Ref Range Status   02/26/2019 61 ml/min/1 73sq m Final     No components found for: [facility]    Lab Results   Component Value Date    HDL 33 (L) 03/06/2018    TRIG 208 (H) 03/06/2018       Lab Results   Component Value Date    ALT 17 02/26/2019    AST 17 02/26/2019    ALKPHOS 115 02/26/2019       No results found for: TSH, FREET4, TSI    Impression:  1   Uncontrolled type 2 diabetes mellitus with hyperglycemia (HCC)    2  Obesity (BMI 30-39 9)    3  Type 2 diabetes mellitus with diabetic polyneuropathy, unspecified whether long term insulin use ([place] [address] )    4  Essential hypertension    5  Hyperlipidemia associated with type 2 diabetes mellitus ([place] [address] )    6  Type 2 diabetes mellitus with diabetic polyneuropathy, unspecified whether long term insulin use ([place] [address] )           Plan:    Diagnoses and all orders for this visit:    Uncontrolled type 2 diabetes mellitus with hyperglycemia ([place] [address] )    Lab Results   Component Value Date    HGBA1C 9 7 (H) 08/06/2019    A1c is 9 7%, uncontrolled   blood sugars are elevated in 200-250 range   patient is currently on Humulin U 500 as well as on basal insulin   will discontinue Lantus   discussed to take Humulin U 500 insulin, 55 units with breakfast, lunch and dinner   educated to  Check blood sugars 3 times daily, goal for blood sugar is 80 to 160 mg/dL    Discussed intensive insulin regimen does increase risk for hypoglycemia  Episodes of hypoglycemia can lead to permanent disability and death  Discussed risks/complications associated with uncontrolled diabetes  Advised to adhere to diabetic diet, and recommended staying active/exercising routinely  Keep carbohydrates consistent to limit blood glucose fluctuations  Will refer to medical nutrition therapy  Advised to call if blood sugars less than 70 mg/dl or over 300 mg/dl  Discussed symptoms and treatment of hypoglycemia  Recommended routine follow-up with podiatry and ophthalmology  Ordered blood work to complete prior to next visit  -     Ambulatory referral to Endocrinology  -     POCT blood glucose  -     metFORMIN (GLUCOPHAGE-XR) 500 mg 24 hr tablet; Take 1 tablet (500 mg total) by mouth 2 (two) times a day with meals  -     Ambulatory referral to medical nutrition therapy for diabetes; Future  -     Ambulatory referral to Podiatry; Future  -     Basic metabolic panel; Future  -     C-peptide;  Future  - Hemoglobin A1C; Future  -     Microalbumin / creatinine urine ratio; Future  -     Vitamin D 25 hydroxy; Future    Obesity (BMI 30-39  9)   Counseled about  Lifestyle modifications    Type 2 diabetes mellitus with diabetic polyneuropathy, unspecified whether long term insulin use ([facility])    Adjustment made to Humulin R insulin regimen    -     Insulin Regular Human, Conc, (HUMULIN R U-500 KWIKPEN) 500 units/mL CONCENTRATED U-500 injection pen; Inject 55 units with breakfast, lunch and dinner  -     Ambulatory referral to medical nutrition therapy for diabetes; Future    Essential hypertension    BP Readings from Last 3 Encounters:   09/25/19 138/80   08/08/19 128/88   05/28/19 128/90    blood pressure well controlled   continue current management  Hyperlipidemia associated with type 2 diabetes mellitus ([facility] )   continue statins   repeat lipid profile before next appointment  -     Lipid panel Lab Collect Lab Collect; Future    Type 2 diabetes mellitus with diabetic polyneuropathy, unspecified whether long term insulin use ([facility] )   Will refer to podiatry      Discussed with the patient and all questioned fully answered  He will call me if any problems arise      Counseled patient on diagnostic results, prognosis, risk and benefit of treatment options, instruction for management, importance of treatment compliance, Risk  factor reduction and impressions      Azeem Chambers MD

## 2019-11-04 ENCOUNTER — APPOINTMENT (OUTPATIENT)
Dept: LAB | Facility: CLINIC | Age: 59
End: 2019-11-04
Payer: COMMERCIAL

## 2019-11-04 DIAGNOSIS — E11.42 TYPE 2 DIABETES MELLITUS WITH DIABETIC POLYNEUROPATHY, UNSPECIFIED WHETHER LONG TERM INSULIN USE (HCC): ICD-10-CM

## 2019-11-04 LAB
EST. AVERAGE GLUCOSE BLD GHB EST-MCNC: 260 MG/DL
GLUCOSE P FAST SERPL-MCNC: 350 MG/DL (ref 65–99)
HBA1C MFR BLD: 10.7 % (ref 4.2–6.3)

## 2019-11-04 PROCEDURE — 83036 HEMOGLOBIN GLYCOSYLATED A1C: CPT

## 2019-11-04 PROCEDURE — 36415 COLL VENOUS BLD VENIPUNCTURE: CPT

## 2019-11-04 PROCEDURE — 82947 ASSAY GLUCOSE BLOOD QUANT: CPT

## 2019-11-07 ENCOUNTER — OFFICE VISIT (OUTPATIENT)
Dept: FAMILY MEDICINE CLINIC | Facility: CLINIC | Age: 59
End: 2019-11-07
Payer: COMMERCIAL

## 2019-11-07 VITALS
HEART RATE: 89 BPM | BODY MASS INDEX: 32.58 KG/M2 | TEMPERATURE: 98.5 F | OXYGEN SATURATION: 97 % | WEIGHT: 215 LBS | HEIGHT: 68 IN | DIASTOLIC BLOOD PRESSURE: 68 MMHG | SYSTOLIC BLOOD PRESSURE: 162 MMHG

## 2019-11-07 DIAGNOSIS — Z23 ENCOUNTER FOR IMMUNIZATION: ICD-10-CM

## 2019-11-07 DIAGNOSIS — I10 ESSENTIAL HYPERTENSION: ICD-10-CM

## 2019-11-07 DIAGNOSIS — E66.9 OBESITY (BMI 30-39.9): ICD-10-CM

## 2019-11-07 DIAGNOSIS — Z71.6 TOBACCO ABUSE COUNSELING: ICD-10-CM

## 2019-11-07 DIAGNOSIS — Z13.31 POSITIVE DEPRESSION SCREENING: ICD-10-CM

## 2019-11-07 DIAGNOSIS — E11.42 TYPE 2 DIABETES MELLITUS WITH DIABETIC POLYNEUROPATHY, UNSPECIFIED WHETHER LONG TERM INSULIN USE (HCC): Primary | ICD-10-CM

## 2019-11-07 PROBLEM — R80.9 PROTEINURIA: Status: ACTIVE | Noted: 2019-11-07

## 2019-11-07 PROBLEM — G58.9 MONONEUROPATHY, UNSPECIFIED: Status: ACTIVE | Noted: 2019-11-07

## 2019-11-07 PROBLEM — K52.9 OTHER AND UNSPECIFIED NONINFECTIOUS GASTROENTERITIS AND COLITIS: Status: ACTIVE | Noted: 2019-11-07

## 2019-11-07 PROBLEM — E11.9 TYPE 2 OR UNSPECIFIED TYPE DIABETES MELLITUS: Status: ACTIVE | Noted: 2019-11-07

## 2019-11-07 PROBLEM — Z00.01 ENCOUNTER FOR GENERAL ADULT MEDICAL EXAMINATION WITH ABNORMAL FINDINGS: Status: ACTIVE | Noted: 2019-11-07

## 2019-11-07 PROBLEM — E78.5 OTHER AND UNSPECIFIED HYPERLIPIDEMIA: Status: ACTIVE | Noted: 2019-11-07

## 2019-11-07 PROCEDURE — 90715 TDAP VACCINE 7 YRS/> IM: CPT

## 2019-11-07 PROCEDURE — 90471 IMMUNIZATION ADMIN: CPT

## 2019-11-07 PROCEDURE — 99214 OFFICE O/P EST MOD 30 MIN: CPT | Performed by: NURSE PRACTITIONER

## 2019-11-07 PROCEDURE — 90472 IMMUNIZATION ADMIN EACH ADD: CPT

## 2019-11-07 PROCEDURE — 90682 RIV4 VACC RECOMBINANT DNA IM: CPT

## 2019-11-07 NOTE — PROGRESS NOTES
Assessment/Plan:    No problem-specific Assessment & Plan notes found for this encounter  Diagnoses and all orders for this visit:    Type 2 diabetes mellitus with diabetic polyneuropathy, unspecified whether long term insulin use ([facility] [address] )  Comments:  HgbA1C  10 8  pt is following with Endocrine    Essential hypertension  Comments:  bp 162/80 pt cont Imdur    Encounter for immunization  -     influenza vaccine, [id number], quadrivalent, recombinant, PF, 0 5 mL, for patients 18 yr+ (FLUBLOK)  -     TDAP VACCINE GREATER THAN OR EQUAL TO 6YO IM    Positive depression screening  Comments:  Pt declines medication or counseling    Tobacco abuse counseling  Comments:  Pt not ready to quit smoking    Obesity (BMI 30-39  9)  Comments:  Pt counceled on diet and exercise          Subjective:      Patient ID: Amy Aj is a 62 y o  male  Diabetes Mellitus  Patient presents for follow up of diabetes  Current symptoms include: visual disturbances  Symptoms have been intermittent  Patient denies weight loss  Evaluation to date has included: fasting blood sugar, fasting lipid panel, hemoglobin A1C and microalbuminuria  Home sugars: BGs are running  consistent with Hgb A1C  Current treatment: Humulin U500 3X daily, has stopped Invokana and Lantus and cont Metformin XR 500bid Pt is now seeing endocrinology  The following portions of the patient's history were reviewed and updated as appropriate: He  has no past medical history on file    He   Patient Active Problem List    Diagnosis Date Noted    Mononeuropathy, unspecified 11/07/2019    Other and unspecified noninfectious gastroenteritis and colitis 11/07/2019    Other and unspecified hyperlipidemia 11/07/2019    Proteinuria 11/07/2019    Tobacco abuse counseling 11/07/2019    Type 2 or unspecified type diabetes mellitus 11/07/2019    Obesity (BMI 30-39 9) 08/08/2019    Positive depression screening 08/08/2019    Bilateral lower extremity edema 11/27/2018  Callous ulcer, limited to breakdown of skin ([place] [address] ) 10/30/2018    Non-intractable cyclical vomiting with nausea 10/04/2018    Fatigue due to exposure 04/14/2017    Essential hypertension 04/13/2017    Hyperlipidemia associated with type 2 diabetes mellitus ([place] [address] ) 04/13/2017    Methadone maintenance therapy patient ([place] [address] ) 04/13/2017    Type 2 diabetes mellitus with diabetic polyneuropathy ([place] [address] ) 04/13/2017    Chronic hepatitis C without hepatic coma ([place] [address] ) 02/27/2017     He  has no past surgical history on file  His family history includes Diabetes in his mother; Hypertension in his father  He  reports that he has quit smoking  He has never used smokeless tobacco  He reports that he does not drink alcohol or use drugs    Current Outpatient Medications   Medication Sig Dispense Refill    erythromycin (ILOTYCIN) ophthalmic ointment 0 5 inches daily at bedtime      furosemide (LASIX) 40 mg tablet Take 1 tablet (40 mg total) by mouth daily 90 tablet 3    gabapentin (NEURONTIN) 100 mg capsule Take 1 capsule (100 mg total) by mouth 3 (three) times a day 90 capsule 3    Insulin Pen Needle 31G X 5 MM MISC by Does not apply route daily Pt to inject 4 X daily 100 each 5    Insulin Pen Needle 31G X 5 MM MISC 30 units sq 2X daily 100 each 3    Insulin Regular Human, Conc, (HUMULIN R U-500 KWIKPEN) 500 units/mL CONCENTRATED U-500 injection pen Inject 55 units with breakfast, lunch and dinner 4 pen 3    isosorbide mononitrate (IMDUR) 120 mg 24 hr tablet Take 1 tablet (120 mg total) by mouth daily 90 tablet 3    isosorbide mononitrate (IMDUR) 120 mg 24 hr tablet TAKE 1 TABLET BY MOUTH ONCE DAILY (Patient not taking: Reported on 9/25/2019) 90 tablet 1    metFORMIN (GLUCOPHAGE-XR) 500 mg 24 hr tablet Take 1 tablet (500 mg total) by mouth 2 (two) times a day with meals 60 tablet 3    methadone (DOLOPHINE) 10 MG/5ML solution Take 70 mg by mouth      omeprazole (PriLOSEC) 20 mg delayed release capsule Take 1 capsule (20 mg total) by mouth daily 90 capsule 3    potassium chloride (K-DUR,KLOR-CON) 20 mEq tablet Take 2 tablets (40 mEq total) by mouth daily 180 tablet 3    pravastatin (PRAVACHOL) 20 mg tablet Take 1 tablet (20 mg total) by mouth daily 90 tablet 3     No current facility-administered medications for this visit  Current Outpatient Medications on File Prior to Visit   Medication Sig    erythromycin (ILOTYCIN) ophthalmic ointment 0 5 inches daily at bedtime    furosemide (LASIX) 40 mg tablet Take 1 tablet (40 mg total) by mouth daily    gabapentin (NEURONTIN) 100 mg capsule Take 1 capsule (100 mg total) by mouth 3 (three) times a day    Insulin Pen Needle 31G X 5 MM MISC by Does not apply route daily Pt to inject 4 X daily    Insulin Pen Needle 31G X 5 MM MISC 30 units sq 2X daily    Insulin Regular Human, Conc, (HUMULIN R U-500 KWIKPEN) 500 units/mL CONCENTRATED U-500 injection pen Inject 55 units with breakfast, lunch and dinner    isosorbide mononitrate (IMDUR) 120 mg 24 hr tablet Take 1 tablet (120 mg total) by mouth daily    isosorbide mononitrate (IMDUR) 120 mg 24 hr tablet TAKE 1 TABLET BY MOUTH ONCE DAILY (Patient not taking: Reported on 9/25/2019)    metFORMIN (GLUCOPHAGE-XR) 500 mg 24 hr tablet Take 1 tablet (500 mg total) by mouth 2 (two) times a day with meals    methadone (DOLOPHINE) 10 MG/5ML solution Take 70 mg by mouth    omeprazole (PriLOSEC) 20 mg delayed release capsule Take 1 capsule (20 mg total) by mouth daily    potassium chloride (K-DUR,KLOR-CON) 20 mEq tablet Take 2 tablets (40 mEq total) by mouth daily    pravastatin (PRAVACHOL) 20 mg tablet Take 1 tablet (20 mg total) by mouth daily     No current facility-administered medications on file prior to visit  He is allergic to lisinopril and varenicline       Lab Results   Component Value Date    HGBA1C 10 7 (H) 11/04/2019     Lab Results   Component Value Date    GLUF 350 (H) 11/04/2019       Review of Systems Constitutional: Negative  HENT: Negative  Eyes: Negative  Respiratory: Negative  Cardiovascular: Negative  Gastrointestinal: Negative  Endocrine: Negative  Genitourinary: Negative  Musculoskeletal: Negative  Skin: Negative  Allergic/Immunologic: Negative  Neurological: Positive for numbness  Pain and burning both feet   Hematological: Negative  Psychiatric/Behavioral: Negative  BMI Counseling: Body mass index is 32 69 kg/m²  Discussed the patient's BMI with him  The BMI is above normal  Nutrition recommendations include reducing portion sizes, decreasing overall calorie intake, 3-5 servings of fruits/vegetables daily, reducing fast food intake, consuming healthier snacks, decreasing soda and/or juice intake, moderation in carbohydrate intake, increasing intake of lean protein, reducing intake of saturated fat and trans fat and reducing intake of cholesterol  Exercise recommendations include strength training exercises  PHQ-9 Depression Screening    PHQ-9:    Frequency of the following problems over the past two weeks:       Little interest or pleasure in doing things:  1 - several days  Feeling down, depressed, or hopeless:  1 - several days  PHQ-2 Score:  2          Depression Screening Follow-up Plan: Patient's depression screening was positive with a PHQ-2 score of 2    Patient declines further evaluation by mental health professional and/or medications  They have no active suicidal ideations  Brief counseling provided and recommend additional follow-up/re-evaluation at next office visit  Tobacco Cessation Counseling: Tobacco cessation counseling was not provided  The patient is sincerely urged to quit consumption of tobacco  He is not ready to quit tobacco  The numerous health risks of tobacco consumption were discussed   If he decides to quit, there are a number of helpful adjunctive aids, and he can see me to discuss nicotine replacement therapy, chantix, or bupropion anytime in the future  Objective:      /68   Pulse 89   Temp 98 5 °F (36 9 °C)   Ht 5' 8" (1 727 m)   Wt 97 5 kg (215 lb)   SpO2 97%   BMI 32 69 kg/m²          Physical Exam   Constitutional: He is oriented to person, place, and time  He appears well-developed and well-nourished  HENT:   Head: Normocephalic  Eyes: Pupils are equal, round, and reactive to light  Conjunctivae and EOM are normal    Neck: Normal range of motion  Neck supple  Cardiovascular: Normal rate and regular rhythm  Pulses:       Dorsalis pedis pulses are 2+ on the right side, and 2+ on the left side  Posterior tibial pulses are 2+ on the right side, and 2+ on the left side  Pulmonary/Chest: Effort normal and breath sounds normal    Abdominal: Soft  Bowel sounds are normal    Musculoskeletal: Normal range of motion  Feet:   Right Foot:   Skin Integrity: Positive for callus  Left Foot:   Skin Integrity: Positive for callus  Neurological: He is alert and oriented to person, place, and time  A sensory deficit is present  He exhibits abnormal muscle tone  Pt reports bilateral lower extremity pain    Skin: Skin is warm and dry  Psychiatric: He has a normal mood and affect   His behavior is normal  Judgment and thought content normal

## 2019-11-12 ENCOUNTER — TELEPHONE (OUTPATIENT)
Dept: ENDOCRINOLOGY | Facility: CLINIC | Age: 59
End: 2019-11-12

## 2019-11-12 NOTE — TELEPHONE ENCOUNTER
----- Message from Danita Crenshaw sent at 11/10/2019  7:49 AM EST -----  This is the pt I tiger txt you about  Cindi Sequin  His labs are higher and he has  Been compliant for 2 mos with his medication regimen, how would you like to proceed?  ----- Message -----  From: Lab, Background User  Sent: 11/4/2019   6:06 PM EST  To: VERONICA Crenshaw

## 2019-11-12 NOTE — TELEPHONE ENCOUNTER
Please call pt and ask to send the log, so we can adjust his regimen  for past  2 weeks also he has upcoming appt, please inform pt to follow up   He was supposed to send log after his appt, never received any log  He should be checking blood sugars before each meal and at bedtime  Also verify if pt is missing any doses of insulin, thanks     Ivette Costa MD

## 2019-11-12 NOTE — TELEPHONE ENCOUNTER
Please call pt and make sure that he calls endocrinology about his sugars because they want to make adjustments in his medicatoin

## 2019-11-20 NOTE — TELEPHONE ENCOUNTER
Spoke to pts father who said he will have patient call us back, but has appt on 12 5 19 and he knows to bring meter to appt  Asked to have pt call us back anyway   He will give pt the message

## 2019-11-26 ENCOUNTER — APPOINTMENT (OUTPATIENT)
Dept: LAB | Facility: CLINIC | Age: 59
End: 2019-11-26
Payer: COMMERCIAL

## 2019-11-26 DIAGNOSIS — E78.5 HYPERLIPIDEMIA ASSOCIATED WITH TYPE 2 DIABETES MELLITUS (HCC): ICD-10-CM

## 2019-11-26 DIAGNOSIS — E11.69 HYPERLIPIDEMIA ASSOCIATED WITH TYPE 2 DIABETES MELLITUS (HCC): ICD-10-CM

## 2019-11-26 DIAGNOSIS — E11.65 UNCONTROLLED TYPE 2 DIABETES MELLITUS WITH HYPERGLYCEMIA (HCC): ICD-10-CM

## 2019-11-26 LAB
25(OH)D3 SERPL-MCNC: 8.2 NG/ML (ref 30–100)
ANION GAP SERPL CALCULATED.3IONS-SCNC: 6 MMOL/L (ref 4–13)
BUN SERPL-MCNC: 19 MG/DL (ref 5–25)
CALCIUM SERPL-MCNC: 9.6 MG/DL (ref 8.3–10.1)
CHLORIDE SERPL-SCNC: 104 MMOL/L (ref 100–108)
CHOLEST SERPL-MCNC: 169 MG/DL (ref 50–200)
CO2 SERPL-SCNC: 26 MMOL/L (ref 21–32)
CREAT SERPL-MCNC: 1.2 MG/DL (ref 0.6–1.3)
CREAT UR-MCNC: 449 MG/DL
EST. AVERAGE GLUCOSE BLD GHB EST-MCNC: 283 MG/DL
GFR SERPL CREATININE-BSD FRML MDRD: 65 ML/MIN/1.73SQ M
GLUCOSE P FAST SERPL-MCNC: 242 MG/DL (ref 65–99)
HBA1C MFR BLD: 11.5 % (ref 4.2–6.3)
HDLC SERPL-MCNC: 33 MG/DL
LDLC SERPL CALC-MCNC: 95 MG/DL (ref 0–100)
MICROALBUMIN UR-MCNC: 1750 MG/L (ref 0–20)
MICROALBUMIN/CREAT 24H UR: 390 MG/G CREATININE (ref 0–30)
NONHDLC SERPL-MCNC: 136 MG/DL
POTASSIUM SERPL-SCNC: 4.1 MMOL/L (ref 3.5–5.3)
SODIUM SERPL-SCNC: 136 MMOL/L (ref 136–145)
TRIGL SERPL-MCNC: 206 MG/DL

## 2019-11-26 PROCEDURE — 36415 COLL VENOUS BLD VENIPUNCTURE: CPT

## 2019-11-26 PROCEDURE — 83036 HEMOGLOBIN GLYCOSYLATED A1C: CPT

## 2019-11-26 PROCEDURE — 82043 UR ALBUMIN QUANTITATIVE: CPT

## 2019-11-26 PROCEDURE — 80048 BASIC METABOLIC PNL TOTAL CA: CPT

## 2019-11-26 PROCEDURE — 82306 VITAMIN D 25 HYDROXY: CPT

## 2019-11-26 PROCEDURE — 84681 ASSAY OF C-PEPTIDE: CPT

## 2019-11-26 PROCEDURE — 82570 ASSAY OF URINE CREATININE: CPT

## 2019-11-26 PROCEDURE — 80061 LIPID PANEL: CPT

## 2019-11-27 ENCOUNTER — TELEPHONE (OUTPATIENT)
Dept: ENDOCRINOLOGY | Facility: CLINIC | Age: 59
End: 2019-11-27

## 2019-11-27 LAB — C PEPTIDE SERPL-MCNC: 2.1 NG/ML (ref 1.1–4.4)

## 2019-11-27 NOTE — TELEPHONE ENCOUNTER
----- Message from Mortimer Salk, MD sent at 11/27/2019 11:34 AM EST -----  Please call the patient regarding his abnormal result    His A1c is elevated up to 11 5%, vitamin-D is low, he should start taking vitamin D3 supplementation 4000 International Units daily, until his appointment  Also he should check blood sugar 4 times daily and bring log for his appointment,

## 2019-11-29 ENCOUNTER — TELEPHONE (OUTPATIENT)
Dept: ENDOCRINOLOGY | Facility: CLINIC | Age: 59
End: 2019-11-29

## 2019-11-29 NOTE — TELEPHONE ENCOUNTER
----- Message from Eliel Salgado MD sent at 11/27/2019 11:34 AM EST -----  Please call the patient regarding his abnormal result    His A1c is elevated up to 11 5%, vitamin-D is low, he should start taking vitamin D3 supplementation 4000 International Units daily, until his appointment  Also he should check blood sugar 4 times daily and bring log for his appointment,

## 2019-12-02 ENCOUNTER — TELEPHONE (OUTPATIENT)
Dept: ENDOCRINOLOGY | Facility: CLINIC | Age: 59
End: 2019-12-02

## 2019-12-02 NOTE — TELEPHONE ENCOUNTER
----- Message from Siena Smith MD sent at 11/27/2019 11:34 AM EST -----  Please call the patient regarding his abnormal result    His A1c is elevated up to 11 5%, vitamin-D is low, he should start taking vitamin D3 supplementation 4000 International Units daily, until his appointment  Also he should check blood sugar 4 times daily and bring log for his appointment,

## 2019-12-05 ENCOUNTER — OFFICE VISIT (OUTPATIENT)
Dept: ENDOCRINOLOGY | Facility: CLINIC | Age: 59
End: 2019-12-05
Payer: COMMERCIAL

## 2019-12-05 VITALS
DIASTOLIC BLOOD PRESSURE: 74 MMHG | BODY MASS INDEX: 31.22 KG/M2 | WEIGHT: 206 LBS | HEIGHT: 68 IN | SYSTOLIC BLOOD PRESSURE: 130 MMHG

## 2019-12-05 DIAGNOSIS — E11.42 TYPE 2 DIABETES MELLITUS WITH DIABETIC POLYNEUROPATHY, WITH LONG-TERM CURRENT USE OF INSULIN (HCC): Primary | ICD-10-CM

## 2019-12-05 DIAGNOSIS — E78.5 HYPERLIPIDEMIA ASSOCIATED WITH TYPE 2 DIABETES MELLITUS (HCC): ICD-10-CM

## 2019-12-05 DIAGNOSIS — E11.42 TYPE 2 DIABETES MELLITUS WITH DIABETIC POLYNEUROPATHY, UNSPECIFIED WHETHER LONG TERM INSULIN USE (HCC): ICD-10-CM

## 2019-12-05 DIAGNOSIS — E55.9 VITAMIN D DEFICIENCY: ICD-10-CM

## 2019-12-05 DIAGNOSIS — E11.69 HYPERLIPIDEMIA ASSOCIATED WITH TYPE 2 DIABETES MELLITUS (HCC): ICD-10-CM

## 2019-12-05 DIAGNOSIS — I10 ESSENTIAL HYPERTENSION: ICD-10-CM

## 2019-12-05 DIAGNOSIS — Z79.4 TYPE 2 DIABETES MELLITUS WITH DIABETIC POLYNEUROPATHY, WITH LONG-TERM CURRENT USE OF INSULIN (HCC): Primary | ICD-10-CM

## 2019-12-05 DIAGNOSIS — E11.65 UNCONTROLLED TYPE 2 DIABETES MELLITUS WITH HYPERGLYCEMIA (HCC): ICD-10-CM

## 2019-12-05 PROCEDURE — 3078F DIAST BP <80 MM HG: CPT | Performed by: PHYSICIAN ASSISTANT

## 2019-12-05 PROCEDURE — 3075F SYST BP GE 130 - 139MM HG: CPT | Performed by: PHYSICIAN ASSISTANT

## 2019-12-05 PROCEDURE — 99214 OFFICE O/P EST MOD 30 MIN: CPT | Performed by: PHYSICIAN ASSISTANT

## 2019-12-05 RX ORDER — BLOOD-GLUCOSE METER
EACH MISCELLANEOUS
Qty: 1 KIT | Refills: 0 | Status: SHIPPED | OUTPATIENT
Start: 2019-12-05 | End: 2021-07-19 | Stop reason: SDUPTHER

## 2019-12-05 RX ORDER — ERGOCALCIFEROL 1.25 MG/1
50000 CAPSULE ORAL WEEKLY
Qty: 8 CAPSULE | Refills: 0
Start: 2019-12-05 | End: 2019-12-09 | Stop reason: SDUPTHER

## 2019-12-05 RX ORDER — BLOOD-GLUCOSE METER
EACH MISCELLANEOUS
COMMUNITY
End: 2019-12-05 | Stop reason: SDUPTHER

## 2019-12-05 RX ORDER — METFORMIN HYDROCHLORIDE 500 MG/1
1000 TABLET, EXTENDED RELEASE ORAL 2 TIMES DAILY WITH MEALS
Qty: 60 TABLET | Refills: 3
Start: 2019-12-05 | End: 2020-03-03

## 2019-12-05 NOTE — PROGRESS NOTES
Patient Progress Note      CC: DM2      Referring Provider  Rebecca Campbell, [address] [facility] [address], [address]     History of Present Illness:   Gavin Russell is a 62 y o  male with a history of type 2 diabetes with long term use of insulin  Diabetes course has been stable  Complications of DM: neuropathy  Denies recent illness or hospitalizations  Has had severe hyperglycemic episodes  Denies any issues with his current regimen  Home glucose monitoring: are performed regularly, 2-3 times a day    Home blood glucose readings:   Before breakfast: 134-470 mg/dl  Before lunch: 171-393 mg/dl  Before dinner: (infrequently checks)  Bedtime: N/A    Current regimen: Humulin R U500 55 units TID with meals (patient using it BID), metformin 500 mg BID  compliant most of the time, denies any side effects from medications  Injects in: abdomen  Rotates sites: Yes  Hypoglycemic episodes: Yes, infrequent (usually before dinner)  H/o of hypoglycemia causing hospitalization or Intervention such as glucagon injection  or ambulance call :  No  Treatment of hypoglycemia: discussed treatment    Medic alert tag: recommended: Yes    Diabetes education: NO  Diet: 3 meals per day, 1-2 snacks per day  Timing of meals is predictable  Diabetic diet compliance:  noncompliant some of the time  Patient admits to drinking regular soda and Gatorade on occasion  Activity: Daily activity is predictable: Yes  No routine exercise             Further diabetic ROS: no polyuria or polydipsia, no chest pain, dyspnea     Ophthamology: due now; Dr Emanuel Carlos  Podiatry: foot exam UTD, September 2019    Not on ACE inhibitor/ARB  Has hyperlipidemia: on statin - tolerating well, no myalgias  compliant most of the time, denies any side effects from medications    Thyroid disorders: No  History of pancreatitis: NO    Patient Active Problem List   Diagnosis    Chronic hepatitis C without hepatic coma ([facility] )    Essential hypertension    Fatigue due to exposure    Hyperlipidemia associated with type 2 diabetes mellitus ([facility] )    Methadone maintenance therapy patient ([place] [address] )    Type 2 diabetes mellitus with diabetic polyneuropathy (HCC)    Non-intractable cyclical vomiting with nausea    Callous ulcer, limited to breakdown of skin ([place] [address] )    Bilateral lower extremity edema    Obesity (BMI 30-39  9)    Positive depression screening    Mononeuropathy, unspecified    Other and unspecified noninfectious gastroenteritis and colitis    Other and unspecified hyperlipidemia    Proteinuria    Tobacco abuse counseling    Type 2 or unspecified type diabetes mellitus    Vitamin D deficiency      Past Medical History:   Diagnosis Date    Hepatitis C       History reviewed  No pertinent surgical history     Family History   Problem Relation Age of Onset    Diabetes Mother     Hypertension Father     Leukemia Father     Acute lymphoblastic leukemia Father      Social History     Tobacco Use    Smoking status: Former Smoker    Smokeless tobacco: Never Used   Substance Use Topics    Alcohol use: No     Allergies   Allergen Reactions    Lisinopril Vomiting    Varenicline          Current Outpatient Medications:     Blood Glucose Monitoring Suppl (Valentin Uzhun) w/Device KIT, Use to test blood sugars 3 times daily, Disp: 1 kit, Rfl: 0    furosemide (LASIX) 40 mg tablet, Take 1 tablet (40 mg total) by mouth daily, Disp: 90 tablet, Rfl: 3    gabapentin (NEURONTIN) 100 mg capsule, Take 1 capsule (100 mg total) by mouth 3 (three) times a day, Disp: 90 capsule, Rfl: 3    glucose blood (ONETOUCH VERIO) test strip, Use to test blood sugars 3 times daily, Disp: 300 each, Rfl: 1    Insulin Pen Needle 31G X 5 MM MISC, 30 units sq 2X daily, Disp: 100 each, Rfl: 3    Insulin Regular Human, Conc, (HUMULIN R U-500 KWIKPEN) 500 units/mL CONCENTRATED U-500 injection pen, Inject 55 units with breakfast and 60 units with dinner , Disp: 4 pen, Rfl: 3    isosorbide mononitrate (IMDUR) 120 mg 24 hr tablet, Take 1 tablet (120 mg total) by mouth daily, Disp: 90 tablet, Rfl: 3    isosorbide mononitrate (IMDUR) 120 mg 24 hr tablet, TAKE 1 TABLET BY MOUTH ONCE DAILY, Disp: 90 tablet, Rfl: 1    metFORMIN (GLUCOPHAGE-XR) 500 mg 24 hr tablet, Take 2 tablets (1,000 mg total) by mouth 2 (two) times a day with meals, Disp: 60 tablet, Rfl: 3    methadone (DOLOPHINE) 10 MG/5ML solution, Take 70 mg by mouth, Disp: , Rfl:     omeprazole (PriLOSEC) 20 mg delayed release capsule, Take 1 capsule (20 mg total) by mouth daily, Disp: 90 capsule, Rfl: 3    potassium chloride (K-DUR,KLOR-CON) 20 mEq tablet, Take 2 tablets (40 mEq total) by mouth daily, Disp: 180 tablet, Rfl: 3    pravastatin (PRAVACHOL) 20 mg tablet, Take 1 tablet (20 mg total) by mouth daily, Disp: 90 tablet, Rfl: 3    ergocalciferol (VITAMIN D2) 50,000 units, Take 1 capsule (50,000 Units total) by mouth once a week, Disp: 8 capsule, Rfl: 0    erythromycin (ILOTYCIN) ophthalmic ointment, 0 5 inches daily at bedtime, Disp: , Rfl:     Insulin Pen Needle 31G X 5 MM MISC, by Does not apply route daily Pt to inject 4 X daily (Patient not taking: Reported on 12/5/2019), Disp: 100 each, Rfl: 5  Review of Systems   Constitutional: Positive for fatigue  Negative for activity change, appetite change and unexpected weight change  HENT: Negative for trouble swallowing  Eyes: Positive for visual disturbance (occasionally blurry)  Respiratory: Negative for shortness of breath  Cardiovascular: Negative for chest pain and palpitations  Gastrointestinal: Positive for constipation  Negative for diarrhea  Endocrine: Positive for polyuria  Negative for polydipsia  Musculoskeletal: Positive for arthralgias  Skin: Negative for wound  Neurological: Positive for numbness  Psychiatric/Behavioral: Negative  Physical Exam:  Body mass index is 31 32 kg/m²    /74   Ht 5' 8" (1 727 m)   Wt 93 4 kg (206 lb)   BMI 31 32 kg/m² Wt Readings from Last 3 Encounters:   12/05/19 93 4 kg (206 lb)   11/07/19 97 5 kg (215 lb)   09/25/19 95 7 kg (211 lb)       Physical Exam   Constitutional: He appears well-developed and well-nourished  HENT:   Head: Normocephalic  Eyes: Pupils are equal, round, and reactive to light  EOM are normal  No scleral icterus  Neck: Neck supple  No thyromegaly present  Cardiovascular: Normal rate and regular rhythm  No murmur heard  Pulses:       Radial pulses are 2+ on the right side, and 2+ on the left side  Pulmonary/Chest: Effort normal and breath sounds normal  No respiratory distress  He has no wheezes  Neurological: He is alert  He has normal reflexes  Skin: Skin is warm and dry  Psychiatric: He has a normal mood and affect  Nursing note and vitals reviewed  Patient's shoes and socks were not removed  Labs:   Results for Adore Robles (MRN [id number]) as of 12/5/2019 13:40   Ref  Range 2/26/2019 11:48 5/24/2019 07:22 8/6/2019 07:06 11/4/2019 07:14 11/26/2019 08:16   Hemoglobin A1C Latest Ref Range: 4 2 - 6 3 % 11 3 (H) 9 0 (H) 9 7 (H) 10 7 (H) 11 5 (H)   EAG Latest Units: mg/dl 278 212 232 260 283   Results for Adore Robles (MRN [id number]) as of 12/5/2019 13:40   Ref   Range 11/26/2019 08:16   Sodium Latest Ref Range: 136 - 145 mmol/L 136   Potassium Latest Ref Range: 3 5 - 5 3 mmol/L 4 1   Chloride Latest Ref Range: 100 - 108 mmol/L 104   CO2 Latest Ref Range: 21 - 32 mmol/L 26   Anion Gap Latest Ref Range: 4 - 13 mmol/L 6   BUN Latest Ref Range: 5 - 25 mg/dL 19   Creatinine Latest Ref Range: 0 60 - 1 30 mg/dL 1 20   GLUCOSE FASTING Latest Ref Range: 65 - 99 mg/dL 242 (H)   Calcium Latest Ref Range: 8 3 - 10 1 mg/dL 9 6   eGFR Latest Units: ml/min/1 73sq m 65   Cholesterol Latest Ref Range: 50 - 200 mg/dL 169   Triglycerides Latest Ref Range: <=150 mg/dL 206 (H)   HDL Latest Ref Range: >=40 mg/dL 33 (L)   Non-HDL Cholesterol Latest Units: mg/dl 136   LDL Direct Latest Ref Range: 0 - 100 mg/dL 95   Vit D, 25-Hydroxy Latest Ref Range: 30 0 - 100 0 ng/mL 8 2 (L)     Plan:    Diagnoses and all orders for this visit:    Type 2 diabetes mellitus with diabetic polyneuropathy, with long-term current use of insulin ([facility])  HGA1C 11 7%  Worsened  Patient admits to being noncompliant to following diabetic diet  He is often drinking Gatorade, regular soda, eating cream peaches  Treatment regimen: Advised to make dietary changes; eat 45-60 g of carbs for meals  Stop drinking gatorade and regular soda as that is high in sugar  Discussed which fruits to eat: apples, berries, etc (15 g of carb)  Increase Humulin R U 500 to 65 unit with dinner and continue 55 units with breakfast  Increase metformin to 1000 IU BID  Referred to Umm Nguyen for MNT  Discussed intensive insulin regimen does increase risk for hypoglycemia  Episodes of hypoglycemia can lead to permanent disability and death  Discussed risks/complications associated with uncontrolled diabetes  Advised to adhere to diabetic diet, and recommended staying active/exercising routinely  Keep carbohydrates consistent to limit blood glucose fluctuations  Advised to call if blood sugars less than 70 mg/dl or over 300 mg/dl  Check blood glucose 2+ times a day  Discussed symptoms and treatment of hypoglycemia  Referred to diabetes/nutrition education  Recommended routine follow-up with podiatry and ophthalmology  Send log in 2 weeks  Ordered blood work to complete prior to next visit  -     Hemoglobin A1C; Future  -     Basic metabolic panel; Future  -     Microalbumin / creatinine urine ratio; Future  -     Ambulatory referral to medical nutrition therapy for diabetes;  Future  -     Blood Glucose Monitoring Suppl Rajiv Nettles) w/Device KIT; Use to test blood sugars 3 times daily  -     glucose blood (ONETOUCH VERIO) test strip; Use to test blood sugars 3 times daily  -     Insulin Regular Human, Conc, (HUMULIN R U-500 KWIKPEN) 500 units/mL CONCENTRATED U-500 injection pen; Inject 55 units with breakfast and 60 units with dinner  Essential hypertension  Blood pressure adequately controlled, continue current treatment  -     Basic metabolic panel; Future    Hyperlipidemia associated with type 2 diabetes mellitus (HCC)  LDL 95  Continue statin therapy  Managed by PCP    Vitamin D deficiency  Vitamin D low at 8 2  Start vitamin D2 07902 units weekly for 8 weeks and then take vitamin D3 4000 International Units daily OTC  -     ergocalciferol (VITAMIN D2) 50,000 units; Take 1 capsule (50,000 Units total) by mouth once a week       Discussed with the patient diagnosis and treatment and all questions fully answered  He will call me if any problems arise  Counseled patient on diagnostic results, prognosis, risk and benefit of treatment options, instruction for management, importance of treatment compliance, risk factor reduction and impressions        Annika Patel PA-C

## 2019-12-09 DIAGNOSIS — E55.9 VITAMIN D DEFICIENCY: ICD-10-CM

## 2019-12-09 RX ORDER — ERGOCALCIFEROL 1.25 MG/1
50000 CAPSULE ORAL WEEKLY
Qty: 8 CAPSULE | Refills: 0 | Status: SHIPPED | OUTPATIENT
Start: 2019-12-09 | End: 2020-03-12 | Stop reason: SDUPTHER

## 2019-12-10 ENCOUNTER — OFFICE VISIT (OUTPATIENT)
Dept: FAMILY MEDICINE CLINIC | Facility: CLINIC | Age: 59
End: 2019-12-10
Payer: COMMERCIAL

## 2019-12-10 VITALS
TEMPERATURE: 98.4 F | DIASTOLIC BLOOD PRESSURE: 90 MMHG | SYSTOLIC BLOOD PRESSURE: 152 MMHG | WEIGHT: 204.4 LBS | BODY MASS INDEX: 30.98 KG/M2 | HEIGHT: 68 IN | HEART RATE: 106 BPM | OXYGEN SATURATION: 96 %

## 2019-12-10 DIAGNOSIS — B18.2 CHRONIC HEPATITIS C WITHOUT HEPATIC COMA (HCC): ICD-10-CM

## 2019-12-10 DIAGNOSIS — E11.42 TYPE 2 DIABETES MELLITUS WITH DIABETIC POLYNEUROPATHY, WITH LONG-TERM CURRENT USE OF INSULIN (HCC): ICD-10-CM

## 2019-12-10 DIAGNOSIS — Z79.4 TYPE 2 DIABETES MELLITUS WITH DIABETIC POLYNEUROPATHY, WITH LONG-TERM CURRENT USE OF INSULIN (HCC): ICD-10-CM

## 2019-12-10 DIAGNOSIS — E66.9 OBESITY (BMI 30-39.9): Primary | ICD-10-CM

## 2019-12-10 DIAGNOSIS — R53.1 WEAK: ICD-10-CM

## 2019-12-10 LAB — SL AMB POCT HEMOGLOBIN AIC: 12.4 (ref ?–6.5)

## 2019-12-10 PROCEDURE — 1036F TOBACCO NON-USER: CPT | Performed by: NURSE PRACTITIONER

## 2019-12-10 PROCEDURE — 3046F HEMOGLOBIN A1C LEVEL >9.0%: CPT | Performed by: NURSE PRACTITIONER

## 2019-12-10 PROCEDURE — 99214 OFFICE O/P EST MOD 30 MIN: CPT | Performed by: NURSE PRACTITIONER

## 2019-12-10 PROCEDURE — 83036 HEMOGLOBIN GLYCOSYLATED A1C: CPT | Performed by: NURSE PRACTITIONER

## 2019-12-10 PROCEDURE — 3008F BODY MASS INDEX DOCD: CPT | Performed by: NURSE PRACTITIONER

## 2019-12-10 NOTE — PROGRESS NOTES
Assessment/Plan:    No problem-specific Assessment & Plan notes found for this encounter  Diagnoses and all orders for this visit:    Obesity (BMI 30-39  9)  Comments:  Pt has lost 11lbs  Orders:  -     Ambulatory referral to Gastroenterology; Future    Type 2 diabetes mellitus with diabetic polyneuropathy, with long-term current use of insulin ([facility])  Comments:  POCT hgbA1C 12 4, FBS per pt 544, will reach out to endocrine as pt had recent medication adjustment  Hum U500 and Metformin bid  Orders:  -     Ambulatory referral to Gastroenterology; Future  -     POCT hemoglobin A1c    Weak  -     Ambulatory referral to Gastroenterology; Future    Chronic hepatitis C without hepatic coma ([facility] [address] )  Comments:  Referred to GI  Orders:  -     Ambulatory referral to Gastroenterology; Future          Subjective:      Patient ID: Ivonne Ovalle is a 62 y o  male  Diabetes Mellitus  Patient presents for follow up of diabetes  Current symptoms include: nausea and weakness  Symptoms have gradually worsened  Patient denies vomiting  Evaluation to date has included: fasting blood sugar, fasting lipid panel, hemoglobin A1C and microalbuminuria  Home sugars: 554 today although pt had recent change in medication    Current treatment: Humulin U 500 bid and Metformin    We had a lengthy discussion about pt hx of use of methadone and Hep C diagnosis   Pt cont with large abdomen with CT abd completed 10/18 no significant findings  I discussed with Leif Her that I wish for him to see GI due to ongoing nausea , Hep C and brittle DM ? Gastroparesis  Will appreciate their input  I am also reaching out to Endocrine as pts POCT HgbA1C 12 4 today with recent adjustment in DM medication 5 days ago with Endocrine        The following portions of the patient's history were reviewed and updated as appropriate: He  has a past medical history of Hepatitis C    He   Patient Active Problem List    Diagnosis Date Noted    Vitamin D deficiency 12/05/2019    Mononeuropathy, unspecified 11/07/2019    Other and unspecified noninfectious gastroenteritis and colitis 11/07/2019    Other and unspecified hyperlipidemia 11/07/2019    Proteinuria 11/07/2019    Tobacco abuse counseling 11/07/2019    Type 2 or unspecified type diabetes mellitus 11/07/2019    Obesity (BMI 30-39 9) 08/08/2019    Positive depression screening 08/08/2019    Bilateral lower extremity edema 11/27/2018    Call[facility], limited to breakdown [facility] ([facility] ) 10/30/2018    Non-intractable cyclical vomiting with nausea 10/04/2018    Fatigue due to exposure 04/14/2017    Essential hypertension 04/13/2017    Hyperlipidemia associated with type 2 diabetes mellitus ([facility] ) 04/13/2017    Methadone maintenance therapy patient ([place] [address] ) 04/13/2017    Type 2 diabetes mellitus with diabetic polyneuropathy ([facility] ) 04/13/2017    Chronic hepatitis C without hepatic coma ([facility] ) 02/27/2017     He  has no past surgical history on file  His family history includes Acute lymphoblastic leukemia in his father; Diabetes in his mother; Hypertension in his father; Leukemia in his father  He  reports that he has quit smoking  He has never used smokeless tobacco  He reports that he does not drink alcohol or use drugs    Current Outpatient Medications   Medication Sig Dispense Refill    Blood Glucose Monitoring Suppl (Hugo Mendoza) w/Device KIT Use to test blood sugars 3 times daily 1 kit 0    ergocalciferol (VITAMIN D2) 50,000 units Take 1 capsule (50,000 Units total) by mouth once a week 8 capsule 0    erythromycin (ILOTYCIN) ophthalmic ointment 0 5 inches daily at bedtime      furosemide (LASIX) 40 mg tablet Take 1 tablet (40 mg total) by mouth daily 90 tablet 3    gabapentin (NEURONTIN) 100 mg capsule Take 1 capsule (100 mg total) by mouth 3 (three) times a day 90 capsule 3    glucose blood (ONETOUCH VERIO) test strip Use to test blood sugars 3 times daily 300 each 1    Insulin Pen Needle 31G X 5 MM MISC 30 units sq 2X daily 100 each 3    Insulin Regular Human, Conc, (HUMULIN R U-500 KWIKPEN) 500 units/mL CONCENTRATED U-500 injection pen Inject 55 units with breakfast and 60 units with dinner  4 pen 3    isosorbide mononitrate (IMDUR) 120 mg 24 hr tablet Take 1 tablet (120 mg total) by mouth daily 90 tablet 3    isosorbide mononitrate (IMDUR) 120 mg 24 hr tablet TAKE 1 TABLET BY MOUTH ONCE DAILY 90 tablet 1    metFORMIN (GLUCOPHAGE-XR) 500 mg 24 hr tablet Take 2 tablets (1,000 mg total) by mouth 2 (two) times a day with meals 60 tablet 3    methadone (DOLOPHINE) 10 MG/5ML solution Take 70 mg by mouth      omeprazole (PriLOSEC) 20 mg delayed release capsule Take 1 capsule (20 mg total) by mouth daily 90 capsule 3    potassium chloride (K-DUR,KLOR-CON) 20 mEq tablet Take 2 tablets (40 mEq total) by mouth daily 180 tablet 3    pravastatin (PRAVACHOL) 20 mg tablet Take 1 tablet (20 mg total) by mouth daily 90 tablet 3    Insulin Pen Needle 31G X 5 MM MISC by Does not apply route daily Pt to inject 4 X daily (Patient not taking: Reported on 12/5/2019) 100 each 5     No current facility-administered medications for this visit        Current Outpatient Medications on File Prior to Visit   Medication Sig    Blood Glucose Monitoring Suppl (Aurora Conroy) w/Device KIT Use to test blood sugars 3 times daily    ergocalciferol (VITAMIN D2) 50,000 units Take 1 capsule (50,000 Units total) by mouth once a week    erythromycin (ILOTYCIN) ophthalmic ointment 0 5 inches daily at bedtime    furosemide (LASIX) 40 mg tablet Take 1 tablet (40 mg total) by mouth daily    gabapentin (NEURONTIN) 100 mg capsule Take 1 capsule (100 mg total) by mouth 3 (three) times a day    glucose blood (ONETOUCH VERIO) test strip Use to test blood sugars 3 times daily    Insulin Pen Needle 31G X 5 MM MISC 30 units sq 2X daily    Insulin Regular Human, Conc, (HUMULIN R U-500 KWIKPEN) 500 units/mL CONCENTRATED U-500 injection pen Inject 55 units with breakfast and 60 units with dinner   isosorbide mononitrate (IMDUR) 120 mg 24 hr tablet Take 1 tablet (120 mg total) by mouth daily    isosorbide mononitrate (IMDUR) 120 mg 24 hr tablet TAKE 1 TABLET BY MOUTH ONCE DAILY    metFORMIN (GLUCOPHAGE-XR) 500 mg 24 hr tablet Take 2 tablets (1,000 mg total) by mouth 2 (two) times a day with meals    methadone (DOLOPHINE) 10 MG/5ML solution Take 70 mg by mouth    omeprazole (PriLOSEC) 20 mg delayed release capsule Take 1 capsule (20 mg total) by mouth daily    potassium chloride (K-DUR,KLOR-CON) 20 mEq tablet Take 2 tablets (40 mEq total) by mouth daily    pravastatin (PRAVACHOL) 20 mg tablet Take 1 tablet (20 mg total) by mouth daily    Insulin Pen Needle 31G X 5 MM MISC by Does not apply route daily Pt to inject 4 X daily (Patient not taking: Reported on 12/5/2019)     No current facility-administered medications on file prior to visit  He is allergic to lisinopril and varenicline       Review of Systems   Constitutional: Negative  HENT: Negative  Eyes: Negative  Respiratory: Negative  Cardiovascular: Negative  Gastrointestinal: Positive for constipation and nausea  Endocrine: Negative  Genitourinary: Negative  Musculoskeletal: Negative  Skin: Negative  Allergic/Immunologic: Negative  Neurological: Positive for numbness  Pain and burning both feet   Hematological: Negative  Psychiatric/Behavioral: Negative  Objective:      /90 (BP Location: Right arm, Patient Position: Sitting, Cuff Size: Adult)   Pulse (!) 106   Temp 98 4 °F (36 9 °C) (Tympanic)   Ht 5' 8" (1 727 m)   Wt 92 7 kg (204 lb 6 4 oz)   SpO2 96%   BMI 31 08 kg/m²          Physical Exam   Constitutional: He is oriented to person, place, and time  He appears well-developed and well-nourished  HENT:   Head: Normocephalic  Eyes: Pupils are equal, round, and reactive to light   Conjunctivae and EOM are normal    Neck: Normal range of motion  Neck supple  Cardiovascular: Normal rate and regular rhythm  Pulses:       Dorsalis pedis pulses are 2+ on the right side, and 2+ on the left side  Posterior tibial pulses are 2+ on the right side, and 2+ on the left side  Pulmonary/Chest: Effort normal and breath sounds normal    Abdominal: He exhibits distension  Bowel sounds are decreased  There is no tenderness  abd firm, tympanic in nature    No vomiting ongoing nausea and constipation   Musculoskeletal: Normal range of motion  Feet:   Right Foot:   Skin Integrity: Positive for callus  Left Foot:   Skin Integrity: Positive for callus  Neurological: He is alert and oriented to person, place, and time  A sensory deficit is present  He exhibits abnormal muscle tone  Pt reports bilateral lower extremity pain    Skin: Skin is warm and dry  Psychiatric: He has a normal mood and affect   His behavior is normal  Judgment and thought content normal

## 2019-12-11 DIAGNOSIS — E11.42 TYPE 2 DIABETES MELLITUS WITH DIABETIC POLYNEUROPATHY, UNSPECIFIED WHETHER LONG TERM INSULIN USE (HCC): ICD-10-CM

## 2019-12-11 NOTE — TELEPHONE ENCOUNTER
Spoke with ashok who said he can go on ADA website  Or DSME (which could be covered by insurance)  Spoke with patient, said he does not have access to a computer

## 2019-12-11 NOTE — TELEPHONE ENCOUNTER
I received a message from his PCP regarding his high BG levels    Advise increasing U500 to 60 units with breakfast and 70 units with dinner  He should take 20 units if he eats a snack during his night shift  Advise patient to check BG at least 3 times a day and send log in 1 week  I strongly encourage him to make an appointment with Carmen Holman as his fluctuations in BG levels may often be due to noncompliance to diet         Annika Patel PA-C

## 2019-12-11 NOTE — TELEPHONE ENCOUNTER
Spoke to pt who understood to increase insulin  Med list updated  Will send log in 1 week  Per pt, nutrition is not covered with his insurance

## 2019-12-11 NOTE — TELEPHONE ENCOUNTER
We should ask Bri Sanderson if there are other resources the patient can use to learn about diabetic diet  Can you please add the 20 units with snack to the instructions       Annika Patel PA-C

## 2019-12-11 NOTE — TELEPHONE ENCOUNTER
----- Message from [place], [place] sent at 12/10/2019  1:38 PM EST -----  Pt was seen by you approx 12/5/19 - [birth year]58 and did have insulin adjustment per your group  I am his pcp and did see him in the office and he states that he is taking the insulin as directed Todays HgbA1C 12 4 and sugar 544, he had not taken his am medication yet as his appt was very early this am  Pt states he does not feel good/   Would you be adverse to me adding a coverage scale with Hum R for this patient ?       Tomy Buckner

## 2019-12-11 NOTE — TELEPHONE ENCOUNTER
Jorge Fraga,     I discussed this case with Dr Enrique Luther and at this point we feel that instead of starting him on a scale, we should adjust his current insulin regimen  I suggest increasing the Humulin R U500 in the morning to 60 units and in the evening to 70 units  Considering his BG was running high that morning, he may be eating during his night shift which could be resulting in a rise in blood glucose  We can start him on 20 units with evening/night time snack  He did admit to being noncompliant with a diabetic diet during his OV last week which may explain some of the variations in blood glucose levels  I did recommend that he follow-up with our dietician, but he has yet to schedule an appointment  We can reach out to the patient regarding these changes and have him send her a BG log so we can continue to make adjustments  Thank you       Annika Patel PA-C

## 2019-12-12 NOTE — TELEPHONE ENCOUNTER
LM for patient to call insurance to find out if codes are covered  Asked that pt call back to let me know

## 2020-01-07 ENCOUNTER — OFFICE VISIT (OUTPATIENT)
Dept: GASTROENTEROLOGY | Facility: CLINIC | Age: 60
End: 2020-01-07
Payer: COMMERCIAL

## 2020-01-07 VITALS
TEMPERATURE: 97.9 F | HEIGHT: 68 IN | DIASTOLIC BLOOD PRESSURE: 82 MMHG | WEIGHT: 207.8 LBS | SYSTOLIC BLOOD PRESSURE: 153 MMHG | HEART RATE: 77 BPM | BODY MASS INDEX: 31.49 KG/M2

## 2020-01-07 DIAGNOSIS — B18.2 CHRONIC HEPATITIS C WITHOUT HEPATIC COMA (HCC): Primary | ICD-10-CM

## 2020-01-07 DIAGNOSIS — E11.42 TYPE 2 DIABETES MELLITUS WITH DIABETIC POLYNEUROPATHY, WITH LONG-TERM CURRENT USE OF INSULIN (HCC): ICD-10-CM

## 2020-01-07 DIAGNOSIS — E66.9 OBESITY (BMI 30-39.9): ICD-10-CM

## 2020-01-07 DIAGNOSIS — K59.00 CONSTIPATION, UNSPECIFIED CONSTIPATION TYPE: ICD-10-CM

## 2020-01-07 DIAGNOSIS — K21.9 GASTROESOPHAGEAL REFLUX DISEASE, ESOPHAGITIS PRESENCE NOT SPECIFIED: ICD-10-CM

## 2020-01-07 DIAGNOSIS — R19.4 CHANGE IN BOWEL HABITS: ICD-10-CM

## 2020-01-07 DIAGNOSIS — R53.1 WEAK: ICD-10-CM

## 2020-01-07 DIAGNOSIS — Z79.4 TYPE 2 DIABETES MELLITUS WITH DIABETIC POLYNEUROPATHY, WITH LONG-TERM CURRENT USE OF INSULIN (HCC): ICD-10-CM

## 2020-01-07 PROCEDURE — 99204 OFFICE O/P NEW MOD 45 MIN: CPT | Performed by: INTERNAL MEDICINE

## 2020-01-07 NOTE — PROGRESS NOTES
[facility] - Outpatient Consultation  Dustin Bruce 62 y o  male MRN: [id number]  Encounter: [id number]          ASSESSMENT AND PLAN:      1  Chronic hepatitis C without hepatic coma ([facility] )  He reports a history of hepatitis C acquired via IV drug use many years ago  He is states he was treated with antiviral agents 5 years ago and was told he was cured  His hepatitis C PCR RNA from 2017 was negative  He called the name of the medication that he was prescribed  I have ordered repeat hepatitis C antibody and HCV PCR testing today to confirm SVR       - Hepatitis C antibody; Future  - Hepatitis C RNA, quantitative, PCR; Future        5  Gastroesophageal reflux disease, esophagitis presence not specified  3  Obesity (BMI 30-39 9)  4  Type 2 diabetes mellitus with diabetic polyneuropathy, with long-term current use of insulin (HCC)  Given his history of chronic gastroesophageal reflux I recommended upper endoscopy for screening for Norman's esophagus  We discussed dietary/lifestyle measures for improved including avoiding trigger foods, weight loss, not eating 2-3 hours before bed and sleeping with the head of the bed elevated  He will continue omeprazole 20 mg daily to be taken 30 minutes before breakfast     Informed consent was obtained for the procedure  Risks of infection, perforation and hemorrhage were discussed  The patient was agreeable to proceed with the procedure  - EGD; Future    6  Change in bowel habits  7  Constipation, unspecified constipation type  He reports new onset constipation over the last year but significant straining with bowel movements of hard stool  He denies signs of GI bleeding  His appetite has been good his weight has been stable  He has no prior colonoscopy  March 2018 fit testing was negative  Given his change in bowel habits I recommend colonoscopy for evaluation    If his colonoscopy is unremarkable he would benefit from up titration of daily MiraLax for relief of his constipation  Informed consent was obtained for the procedure  Risks of infection, perforation and hemorrhage were discussed  The patient was agreeable to proceed with the procedure  - Colonoscopy; Future  - MiraLax/Dulcolax preparation instructions provided      ______________________________________________________________________    HPI:  Mallory Quevedo is a 62 y o  male with a history of insulin-dependent type 2 diabetes, hypertension, GERD, hepatitis-C status post treatment who presents today for new patient evaluation  He reports being diagnosed with hepatitis C many years ago, acquired during IV drug use in past   He states 5 years ago he received antiviral treatment for hepatitis C and was told that he had been cured  He does not recall the name of the medication he was prescribed   His last hepatitis C PCR RNA in 2017 was negative  His liver function test in February 2019 were normal other than total bilirubin 1 2  He reports no family history of liver disease  He reports some mild lower extremity swelling, but denies jaundice, rash or confusion  He reports new onset of constipation over the last year  He has significant straining with bowel movements reports hard stool that is difficult the past   He denies melena or hematochezia  He reports intermittent abdominal bloating and lower abdominal discomfort  He uses prune juice with some constipation  He has intermittently trialed over-the-counter laxative such as MiraLax and stool softeners in the past   He has no prior colonoscopy  He has no family history of colorectal cancer  He has a history of chronic gastroesophageal reflux for which he takes Prilosec 20 mg  He endorses substernal chest burning discomfort after certain trigger foods such as greasy food  He denies dysphagia or odynophagia  He has no prior upper endoscopy  His appetite has been good his weight has been stable        REVIEW OF SYSTEMS:    CONSTITUTIONAL: Denies any fever, chills, rigors, and weight loss  HEENT: No earache or tinnitus  Denies hearing loss or visual disturbances  CARDIOVASCULAR: No chest pain or palpitations  RESPIRATORY: Denies any cough, hemoptysis, shortness of breath or dyspnea on exertion  GASTROINTESTINAL: As noted in the History of Present Illness  GENITOURINARY: No problems with urination  Denies any hematuria or dysuria  NEUROLOGIC: No dizziness or vertigo, denies headaches  MUSCULOSKELETAL: Denies any muscle or joint pain  SKIN: Denies skin rashes or itching  ENDOCRINE: Denies excessive thirst  Denies intolerance to heat or cold  PSYCHOSOCIAL: Denies depression or anxiety  Denies any recent memory loss  Historical Information   Past Medical History:   Diagnosis Date    Hepatitis C      History reviewed  No pertinent surgical history    Social History   Social History     Substance and Sexual Activity   Alcohol Use No     Social History     Substance and Sexual Activity   Drug Use No     Social History     Tobacco Use   Smoking Status Former Smoker   Smokeless Tobacco Never Used     Family History   Problem Relation Age of Onset    Diabetes Mother     Hypertension Father     Leukemia Father     Acute lymphoblastic leukemia Father     Cancer Sister        Meds/Allergies       Current Outpatient Medications:     Blood Glucose Monitoring Suppl (Maliha Godfrey) w/Device KIT    ergocalciferol (VITAMIN D2) 50,000 units    erythromycin (ILOTYCIN) ophthalmic ointment    furosemide (LASIX) 40 mg tablet    gabapentin (NEURONTIN) 100 mg capsule    glucose blood (ONETOUCH VERIO) test strip    Insulin Pen Needle 31G X 5 MM MISC    Insulin Pen Needle 31G X 5 MM MISC    Insulin Regular Human, Conc, (HUMULIN R U-500 KWIKPEN) 500 units/mL CONCENTRATED U-500 injection pen    isosorbide mononitrate (IMDUR) 120 mg 24 hr tablet    isosorbide mononitrate (IMDUR) 120 mg 24 hr tablet    metFORMIN (GLUCOPHAGE-XR) 500 mg 24 hr tablet    methadone (DOLOPHINE) 10 MG/5ML solution    omeprazole (PriLOSEC) 20 mg delayed release capsule    potassium chloride (K-DUR,KLOR-CON) 20 mEq tablet    pravastatin (PRAVACHOL) 20 mg tablet    Allergies   Allergen Reactions    Lisinopril Vomiting    Varenicline            Objective     Blood pressure 153/82, pulse 77, temperature 97 9 °F (36 6 °C), temperature source Tympanic, height 5' 8" (1 727 m), weight 94 3 kg (207 lb 12 8 oz)  Body mass index is 31 6 kg/m²  PHYSICAL EXAM:      General Appearance:   Well-appearing older male Alert, cooperative, no distress   HEENT:   Normocephalic, atraumatic, anicteric  Neck:  Supple, symmetrical, trachea midline   Lungs:   Clear to auscultation bilaterally; no rales, rhonchi or wheezing; respirations unlabored    Heart[de-identified]   Regular rate and rhythm; no murmur, rub, or gallop  Abdomen:   Soft, obese non-tender, non-distended; normal bowel sounds; no masses, no organomegaly    Genitalia:   Deferred    Rectal:   Deferred    Extremities:  No cyanosis, clubbing  Trace lower extremity edema     Pulses:  2+ and symmetric    Skin:  No jaundice, rashes, or lesions  no spider angiomata or palmar erythema   Lymph nodes:  No palpable cervical lymphadenopathy        Lab Results:   No visits with results within 1 Day(s) from this visit  Latest known visit with results is:   Office Visit on 12/10/2019   Component Date Value    Hemoglobin A1C 12/10/2019 12 4*         Radiology Results:   No results found

## 2020-01-07 NOTE — PATIENT INSTRUCTIONS
Scheduled patient with Dr Shauna Galarza on 2/26/2020 for a Colonoscopy/EGD, gave Miralax/ Dulcolax instructions  Gave labs

## 2020-01-30 ENCOUNTER — OFFICE VISIT (OUTPATIENT)
Dept: FAMILY MEDICINE CLINIC | Facility: CLINIC | Age: 60
End: 2020-01-30
Payer: COMMERCIAL

## 2020-01-30 VITALS
WEIGHT: 205 LBS | BODY MASS INDEX: 31.07 KG/M2 | DIASTOLIC BLOOD PRESSURE: 98 MMHG | HEIGHT: 68 IN | SYSTOLIC BLOOD PRESSURE: 140 MMHG | TEMPERATURE: 99.5 F

## 2020-01-30 DIAGNOSIS — B18.2 CHRONIC HEPATITIS C WITHOUT HEPATIC COMA (HCC): ICD-10-CM

## 2020-01-30 DIAGNOSIS — Z71.6 TOBACCO ABUSE COUNSELING: ICD-10-CM

## 2020-01-30 DIAGNOSIS — E55.9 VITAMIN D DEFICIENCY: ICD-10-CM

## 2020-01-30 DIAGNOSIS — E11.42 TYPE 2 DIABETES MELLITUS WITH DIABETIC POLYNEUROPATHY, WITH LONG-TERM CURRENT USE OF INSULIN (HCC): Primary | ICD-10-CM

## 2020-01-30 DIAGNOSIS — Z11.4 SCREENING FOR HIV (HUMAN IMMUNODEFICIENCY VIRUS): ICD-10-CM

## 2020-01-30 DIAGNOSIS — R53.83 OTHER FATIGUE: ICD-10-CM

## 2020-01-30 DIAGNOSIS — F11.20 METHADONE MAINTENANCE THERAPY PATIENT (HCC): ICD-10-CM

## 2020-01-30 DIAGNOSIS — E78.5 HYPERLIPIDEMIA, UNSPECIFIED HYPERLIPIDEMIA TYPE: ICD-10-CM

## 2020-01-30 DIAGNOSIS — Z79.4 TYPE 2 DIABETES MELLITUS WITH DIABETIC POLYNEUROPATHY, WITH LONG-TERM CURRENT USE OF INSULIN (HCC): Primary | ICD-10-CM

## 2020-01-30 LAB — SL AMB POCT HEMOGLOBIN AIC: 10.9 (ref ?–6.5)

## 2020-01-30 PROCEDURE — 83036 HEMOGLOBIN GLYCOSYLATED A1C: CPT | Performed by: NURSE PRACTITIONER

## 2020-01-30 PROCEDURE — 3008F BODY MASS INDEX DOCD: CPT | Performed by: NURSE PRACTITIONER

## 2020-01-30 PROCEDURE — 3046F HEMOGLOBIN A1C LEVEL >9.0%: CPT | Performed by: NURSE PRACTITIONER

## 2020-01-30 PROCEDURE — 1036F TOBACCO NON-USER: CPT | Performed by: NURSE PRACTITIONER

## 2020-01-30 PROCEDURE — 99214 OFFICE O/P EST MOD 30 MIN: CPT | Performed by: NURSE PRACTITIONER

## 2020-01-30 NOTE — PROGRESS NOTES
Assessment/Plan:    No problem-specific Assessment & Plan notes found for this encounter  Diagnoses and all orders for this visit:    Type 2 diabetes mellitus with diabetic polyneuropathy, with long-term current use of insulin ([facility])  Comments:  POCT hgbA1C   10 9  Orders:  -     CBC and differential; Future  -     Comprehensive metabolic panel  -     POCT hemoglobin A1c  -     Hemoglobin A1C; Future    Methadone maintenance therapy patient ([facility] )    Tobacco abuse counseling  Comments:  Pt is not interested in quitting    Chronic hepatitis C without hepatic coma ([facility] )  Comments:  Pt is following with GI    Other fatigue  Comments:  labwork ordered  Orders:  -     TSH, 3rd generation with Free T4 reflex; Future    Hyperlipidemia, unspecified hyperlipidemia type  Comments:  labwork ordered  Orders:  -     Lipid panel; Future    Vitamin D deficiency  Comments:  labwork ordered  Orders:  -     Vitamin D 25 hydroxy; Future    Screening for HIV (human immunodeficiency virus)  Comments:  labwork ordered  Orders:  -     HIV 1/2 AG-AB combo; Future          Subjective:      Patient ID: Travon Antonio is a 62 y o  male  Diabetes Mellitus  Patient presents for follow up of diabetes  Current symptoms include: hyperglycemia and paresthesia of the feet  Symptoms have gradually improved  Patient denies foot ulcerations, nausea and visual disturbances  Evaluation to date has included: fasting blood sugar, fasting lipid panel, hemoglobin A1C and microalbuminuria  Home sugars: BGs have been labile ranging between    Current treatment: Metformin 500mg XR bid,  Pt reports 6 lb weight loss since adding the Metformin, in addition has increased his insulin to 60 IU in the am and 70 IU in the pm,, sugars are improving but are still labile as pt has much difficulty managing his diet working 3rd shift, sleeping during the day and also uses Methadone    Pt has though made an appt with GI and is planning a colonoscopy in Feb 2020         The following portions of the patient's history were reviewed and updated as appropriate: He  has a past medical history of Diabetes mellitus ([facility] ), Hepatitis C, and Hypertension  He   Patient Active Problem List    Diagnosis Date Noted    Vitamin D deficiency 12/05/2019    Mononeuropathy, unspecified 11/07/2019    Other and unspecified noninfectious gastroenteritis and colitis 11/07/2019    Other and unspecified hyperlipidemia 11/07/2019    Proteinuria 11/07/2019    Tobacco abuse counseling 11/07/2019    Type 2 or unspecified type diabetes mellitus 11/07/2019    Obesity (BMI 30-39 9) 08/08/2019    Positive depression screening 08/08/2019    Bilateral lower extremity edema 11/27/2018    Callous ulcer, limited to breakdown of skin ([facility] ) 10/30/2018    Non-intractable cyclical vomiting with nausea 10/04/2018    Fatigue due to exposure 04/14/2017    Essential hypertension 04/13/2017    Hyperlipidemia associated with type 2 diabetes mellitus ([facility] ) 04/13/2017    Methadone maintenance therapy patient ([place] [address] ) 04/13/2017    Type 2 diabetes mellitus with diabetic polyneuropathy ([place] [address] ) 04/13/2017    Chronic hepatitis C without hepatic coma ([place] [address] ) 02/27/2017     He  has no past surgical history on file  His family history includes Acute lymphoblastic leukemia in his father; Cancer in his sister; Diabetes in his mother; Hypertension in his father; Leukemia in his father  He  reports that he has quit smoking  He has never used smokeless tobacco  He reports that he does not drink alcohol or use drugs    Current Outpatient Medications   Medication Sig Dispense Refill    Blood Glucose Monitoring Suppl (ONETOUCH VERIO) w/Device KIT Use to test blood sugars 3 times daily 1 kit 0    ergocalciferol (VITAMIN D2) 50,000 units Take 1 capsule (50,000 Units total) by mouth once a week 8 capsule 0    erythromycin (ILOTYCIN) ophthalmic ointment 0 5 inches daily at bedtime      furosemide (LASIX) 40 mg tablet Take 1 tablet (40 mg total) by mouth daily 90 tablet 3    gabapentin (NEURONTIN) 100 mg capsule Take 1 capsule (100 mg total) by mouth 3 (three) times a day 90 capsule 3    glucose blood (ONETOUCH VERIO) test strip Use to test blood sugars 3 times daily 300 each 1    Insulin Pen Needle 31G X 5 MM MISC by Does not apply route daily Pt to inject 4 X daily 100 each 5    Insulin Pen Needle 31G X 5 MM MISC 30 units sq 2X daily 100 each 3    Insulin Regular Human, Conc, (HUMULIN R U-500 KWIKPEN) 500 units/mL CONCENTRATED U-500 injection pen Inject 60 units with breakfast and 70 units with dinner  20 units with 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 4 pen 3    isosorbide mononitrate (IMDUR) 120 mg 24 hr tablet Take 1 tablet (120 mg total) by mouth daily 90 tablet 3    isosorbide mononitrate (IMDUR) 120 mg 24 hr tablet TAKE 1 TABLET BY MOUTH ONCE DAILY 90 tablet 1    metFORMIN (GLUCOPHAGE-XR) 500 mg 24 hr tablet Take 2 tablets (1,000 mg total) by mouth 2 (two) times a day with meals 60 tablet 3    methadone (DOLOPHINE) 10 MG/5ML solution Take 70 mg by mouth      omeprazole (PriLOSEC) 20 mg delayed release capsule Take 1 capsule (20 mg total) by mouth daily 90 capsule 3    potassium chloride (K-DUR,KLOR-CON) 20 mEq tablet Take 2 tablets (40 mEq total) by mouth daily 180 tablet 3    pravastatin (PRAVACHOL) 20 mg tablet Take 1 tablet (20 mg total) by mouth daily 90 tablet 3     No current facility-administered medications for this visit        Current Outpatient Medications on File Prior to Visit   Medication Sig    Blood Glucose Monitoring Suppl (ONETOUCH VERIO) w/Device KIT Use to test blood sugars 3 times daily    ergocalciferol (VITAMIN D2) 50,000 units Take 1 capsule (50,000 Units total) by mouth once a week    erythromycin (ILOTYCIN) ophthalmic ointment 0 5 inches daily at bedtime    furosemide (LASIX) 40 mg tablet Take 1 tablet (40 mg total) by mouth daily    gabapentin (NEURONTIN) 100 mg capsule Take 1 capsule (100 mg total) by mouth 3 (three) times a day    glucose blood (ONETOUCH VERIO) test strip Use to test blood sugars 3 times daily    Insulin Pen Needle 31G X 5 MM MISC by Does not apply route daily Pt to inject 4 X daily    Insulin Pen Needle 31G X 5 MM MISC 30 units sq 2X daily    Insulin Regular Human, Conc, (HUMULIN R U-500 KWIKPEN) 500 units/mL CONCENTRATED U-500 injection pen Inject 60 units with breakfast and 70 units with dinner  20 units with 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    isosorbide mononitrate (IMDUR) 120 mg 24 hr tablet Take 1 tablet (120 mg total) by mouth daily    isosorbide mononitrate (IMDUR) 120 mg 24 hr tablet TAKE 1 TABLET BY MOUTH ONCE DAILY    metFORMIN (GLUCOPHAGE-XR) 500 mg 24 hr tablet Take 2 tablets (1,000 mg total) by mouth 2 (two) times a day with meals    methadone (DOLOPHINE) 10 MG/5ML solution Take 70 mg by mouth    omeprazole (PriLOSEC) 20 mg delayed release capsule Take 1 capsule (20 mg total) by mouth daily    potassium chloride (K-DUR,KLOR-CON) 20 mEq tablet Take 2 tablets (40 mEq total) by mouth daily    pravastatin (PRAVACHOL) 20 mg tablet Take 1 tablet (20 mg total) by mouth daily     No current facility-administered medications on file prior to visit  He is allergic to lisinopril and varenicline       Lab Results   Component Value Date    HGBA1C 10 9 (A) 01/30/2020     Lab Results   Component Value Date    GLUF 242 (H) 11/26/2019       Review of Systems   Constitutional: Negative  HENT: Negative  Eyes: Negative  Respiratory: Negative  Cardiovascular: Negative  Gastrointestinal: Negative  Endocrine: Negative  Genitourinary: Negative  Musculoskeletal: Negative  Skin: Negative  Allergic/Immunologic: Negative  Neurological: Negative  Hematological: Negative  Psychiatric/Behavioral: Negative                 Objective:      /98   Temp 99 5 °F (37 5 °C)   Ht 5' 8" (1 727 m)   Wt 93 kg (205 lb)   BMI 31 17 kg/m² Physical Exam   Constitutional: He is oriented to person, place, and time  He appears well-developed and well-nourished  HENT:   Head: Normocephalic  Eyes: Pupils are equal, round, and reactive to light  Conjunctivae and EOM are normal    Neck: Normal range of motion  Neck supple  Cardiovascular: Normal rate and regular rhythm  Pulmonary/Chest: Effort normal and breath sounds normal    Abdominal: Soft  Bowel sounds are normal    Musculoskeletal: Normal range of motion  Neurological: He is alert and oriented to person, place, and time  Skin: Skin is warm and dry  Psychiatric: He has a normal mood and affect   His behavior is normal  Judgment and thought content normal

## 2020-02-20 ENCOUNTER — TELEPHONE (OUTPATIENT)
Dept: GASTROENTEROLOGY | Facility: CLINIC | Age: 60
End: 2020-02-20

## 2020-02-24 RX ORDER — LIDOCAINE HYDROCHLORIDE 10 MG/ML
0.5 INJECTION, SOLUTION EPIDURAL; INFILTRATION; INTRACAUDAL; PERINEURAL ONCE AS NEEDED
Status: CANCELLED | OUTPATIENT
Start: 2020-02-24

## 2020-02-24 RX ORDER — SODIUM CHLORIDE, SODIUM LACTATE, POTASSIUM CHLORIDE, CALCIUM CHLORIDE 600; 310; 30; 20 MG/100ML; MG/100ML; MG/100ML; MG/100ML
125 INJECTION, SOLUTION INTRAVENOUS CONTINUOUS
Status: CANCELLED | OUTPATIENT
Start: 2020-02-24

## 2020-02-25 ENCOUNTER — HOSPITAL ENCOUNTER (OUTPATIENT)
Dept: PERIOP | Facility: HOSPITAL | Age: 60
Setting detail: OUTPATIENT SURGERY
Discharge: HOME | End: 2020-02-25
Attending: INTERNAL MEDICINE

## 2020-02-25 ENCOUNTER — TELEPHONE (OUTPATIENT)
Dept: GASTROENTEROLOGY | Facility: CLINIC | Age: 60
End: 2020-02-25

## 2020-02-25 ENCOUNTER — PREP FOR PROCEDURE (OUTPATIENT)
Dept: GASTROENTEROLOGY | Facility: CLINIC | Age: 60
End: 2020-02-25

## 2020-02-25 DIAGNOSIS — B18.2 CHRONIC HEPATITIS C WITHOUT HEPATIC COMA (HCC): Primary | ICD-10-CM

## 2020-02-25 DIAGNOSIS — K21.9 GASTROESOPHAGEAL REFLUX DISEASE, ESOPHAGITIS PRESENCE NOT SPECIFIED: ICD-10-CM

## 2020-02-25 DIAGNOSIS — K59.00 CONSTIPATION, UNSPECIFIED CONSTIPATION TYPE: ICD-10-CM

## 2020-02-25 NOTE — TELEPHONE ENCOUNTER
Patient left a voicemail last night requesting to cancel his colonoscopy & egd due to the death of his sister    Please cancel procedures

## 2020-02-25 NOTE — TELEPHONE ENCOUNTER
Pt called and need to cancel procedure for 2/25 I r/s for 4/7/2020 all prep info verified with patient

## 2020-02-26 ENCOUNTER — TRANSCRIBE ORDERS (OUTPATIENT)
Dept: ADMINISTRATIVE | Facility: HOSPITAL | Age: 60
End: 2020-02-26

## 2020-02-26 ENCOUNTER — HOSPITAL ENCOUNTER (OUTPATIENT)
Dept: RADIOLOGY | Facility: HOSPITAL | Age: 60
Discharge: HOME | End: 2020-02-26
Payer: COMMERCIAL

## 2020-02-26 DIAGNOSIS — R76.11 PPD POSITIVE: Primary | ICD-10-CM

## 2020-02-26 DIAGNOSIS — R76.11 PPD POSITIVE: ICD-10-CM

## 2020-02-26 PROCEDURE — 71046 X-RAY EXAM CHEST 2 VIEWS: CPT

## 2020-03-03 DIAGNOSIS — E11.65 UNCONTROLLED TYPE 2 DIABETES MELLITUS WITH HYPERGLYCEMIA (HCC): ICD-10-CM

## 2020-03-03 RX ORDER — METFORMIN HYDROCHLORIDE 500 MG/1
TABLET, EXTENDED RELEASE ORAL
Qty: 60 TABLET | Refills: 0 | Status: SHIPPED | OUTPATIENT
Start: 2020-03-03 | End: 2020-03-12

## 2020-03-04 ENCOUNTER — LAB (OUTPATIENT)
Dept: LAB | Facility: CLINIC | Age: 60
End: 2020-03-04
Payer: COMMERCIAL

## 2020-03-04 DIAGNOSIS — Z79.4 TYPE 2 DIABETES MELLITUS WITH DIABETIC POLYNEUROPATHY, WITH LONG-TERM CURRENT USE OF INSULIN (HCC): ICD-10-CM

## 2020-03-04 DIAGNOSIS — Z11.4 SCREENING FOR HIV (HUMAN IMMUNODEFICIENCY VIRUS): ICD-10-CM

## 2020-03-04 DIAGNOSIS — I10 ESSENTIAL HYPERTENSION: ICD-10-CM

## 2020-03-04 DIAGNOSIS — E78.5 HYPERLIPIDEMIA, UNSPECIFIED HYPERLIPIDEMIA TYPE: ICD-10-CM

## 2020-03-04 DIAGNOSIS — E55.9 VITAMIN D DEFICIENCY: ICD-10-CM

## 2020-03-04 DIAGNOSIS — E11.42 TYPE 2 DIABETES MELLITUS WITH DIABETIC POLYNEUROPATHY, WITH LONG-TERM CURRENT USE OF INSULIN (HCC): ICD-10-CM

## 2020-03-04 DIAGNOSIS — R53.83 OTHER FATIGUE: ICD-10-CM

## 2020-03-04 LAB
25(OH)D3 SERPL-MCNC: 28.9 NG/ML (ref 30–100)
ALBUMIN SERPL BCP-MCNC: 3.5 G/DL (ref 3.5–5)
ALP SERPL-CCNC: 152 U/L (ref 46–116)
ALT SERPL W P-5'-P-CCNC: 28 U/L (ref 12–78)
ANION GAP SERPL CALCULATED.3IONS-SCNC: 7 MMOL/L (ref 4–13)
AST SERPL W P-5'-P-CCNC: 21 U/L (ref 5–45)
BASOPHILS # BLD AUTO: 0.06 THOUSANDS/ΜL (ref 0–0.1)
BASOPHILS NFR BLD AUTO: 1 % (ref 0–1)
BILIRUB SERPL-MCNC: 0.72 MG/DL (ref 0.2–1)
BUN SERPL-MCNC: 15 MG/DL (ref 5–25)
CALCIUM SERPL-MCNC: 9.2 MG/DL (ref 8.3–10.1)
CHLORIDE SERPL-SCNC: 103 MMOL/L (ref 100–108)
CHOLEST SERPL-MCNC: 155 MG/DL (ref 50–200)
CO2 SERPL-SCNC: 28 MMOL/L (ref 21–32)
CREAT SERPL-MCNC: 1.01 MG/DL (ref 0.6–1.3)
CREAT UR-MCNC: 111 MG/DL
EOSINOPHIL # BLD AUTO: 0.13 THOUSAND/ΜL (ref 0–0.61)
EOSINOPHIL NFR BLD AUTO: 2 % (ref 0–6)
ERYTHROCYTE [DISTWIDTH] IN BLOOD BY AUTOMATED COUNT: 12 % (ref 11.6–15.1)
EST. AVERAGE GLUCOSE BLD GHB EST-MCNC: 283 MG/DL
GFR SERPL CREATININE-BSD FRML MDRD: 80 ML/MIN/1.73SQ M
GLUCOSE P FAST SERPL-MCNC: 332 MG/DL (ref 65–99)
HBA1C MFR BLD: 11.5 %
HCT VFR BLD AUTO: 46.9 % (ref 36.5–49.3)
HDLC SERPL-MCNC: 33 MG/DL
HGB BLD-MCNC: 15.5 G/DL (ref 12–17)
IMM GRANULOCYTES # BLD AUTO: 0.01 THOUSAND/UL (ref 0–0.2)
IMM GRANULOCYTES NFR BLD AUTO: 0 % (ref 0–2)
LDLC SERPL CALC-MCNC: 72 MG/DL (ref 0–100)
LYMPHOCYTES # BLD AUTO: 1.46 THOUSANDS/ΜL (ref 0.6–4.47)
LYMPHOCYTES NFR BLD AUTO: 26 % (ref 14–44)
MCH RBC QN AUTO: 29 PG (ref 26.8–34.3)
MCHC RBC AUTO-ENTMCNC: 33 G/DL (ref 31.4–37.4)
MCV RBC AUTO: 88 FL (ref 82–98)
MICROALBUMIN UR-MCNC: 758 MG/L (ref 0–20)
MICROALBUMIN/CREAT 24H UR: 683 MG/G CREATININE (ref 0–30)
MONOCYTES # BLD AUTO: 0.42 THOUSAND/ΜL (ref 0.17–1.22)
MONOCYTES NFR BLD AUTO: 7 % (ref 4–12)
NEUTROPHILS # BLD AUTO: 3.57 THOUSANDS/ΜL (ref 1.85–7.62)
NEUTS SEG NFR BLD AUTO: 64 % (ref 43–75)
NONHDLC SERPL-MCNC: 122 MG/DL
NRBC BLD AUTO-RTO: 0 /100 WBCS
PLATELET # BLD AUTO: 137 THOUSANDS/UL (ref 149–390)
PMV BLD AUTO: 12.8 FL (ref 8.9–12.7)
POTASSIUM SERPL-SCNC: 4.5 MMOL/L (ref 3.5–5.3)
PROT SERPL-MCNC: 8.4 G/DL (ref 6.4–8.2)
RBC # BLD AUTO: 5.34 MILLION/UL (ref 3.88–5.62)
SODIUM SERPL-SCNC: 138 MMOL/L (ref 136–145)
TRIGL SERPL-MCNC: 249 MG/DL
TSH SERPL DL<=0.05 MIU/L-ACNC: 2.16 UIU/ML (ref 0.36–3.74)
WBC # BLD AUTO: 5.65 THOUSAND/UL (ref 4.31–10.16)

## 2020-03-04 PROCEDURE — 84443 ASSAY THYROID STIM HORMONE: CPT

## 2020-03-04 PROCEDURE — 36415 COLL VENOUS BLD VENIPUNCTURE: CPT | Performed by: NURSE PRACTITIONER

## 2020-03-04 PROCEDURE — 82306 VITAMIN D 25 HYDROXY: CPT

## 2020-03-04 PROCEDURE — 85025 COMPLETE CBC W/AUTO DIFF WBC: CPT

## 2020-03-04 PROCEDURE — 82043 UR ALBUMIN QUANTITATIVE: CPT

## 2020-03-04 PROCEDURE — 87389 HIV-1 AG W/HIV-1&-2 AB AG IA: CPT

## 2020-03-04 PROCEDURE — 82570 ASSAY OF URINE CREATININE: CPT

## 2020-03-04 PROCEDURE — 80061 LIPID PANEL: CPT

## 2020-03-04 PROCEDURE — 3062F POS MACROALBUMINURIA REV: CPT | Performed by: NURSE PRACTITIONER

## 2020-03-04 PROCEDURE — 83036 HEMOGLOBIN GLYCOSYLATED A1C: CPT

## 2020-03-04 PROCEDURE — 80053 COMPREHEN METABOLIC PANEL: CPT | Performed by: NURSE PRACTITIONER

## 2020-03-05 LAB — HIV 1+2 AB+HIV1 P24 AG SERPL QL IA: NORMAL

## 2020-03-11 PROBLEM — E11.9 TYPE 2 OR UNSPECIFIED TYPE DIABETES MELLITUS: Status: RESOLVED | Noted: 2019-11-07 | Resolved: 2020-03-11

## 2020-03-11 NOTE — PROGRESS NOTES
Assessment/Plan:    No problem-specific Assessment & Plan notes found for this encounter  Diagnoses and all orders for this visit:    Annual physical exam  Comments:  Completed today    Type 2 diabetes mellitus with diabetic polyneuropathy, with long-term current use of insulin ([place] [address] )  Comments:  hgbA1C 11 6  pt is seeing Endocrinology today will defer to them for DM medication recommendation    Hyperlipidemia associated with type 2 diabetes mellitus ([place] [address] )  Comments:  Pravastatin increased to 40mg daily Lipids elevated today  Orders:  -     Discontinue: pravastatin (PRAVACHOL) 20 mg tablet; Take 1 tablet (20 mg total) by mouth daily  -     pravastatin (PRAVACHOL) 40 mg tablet; Take 1 tablet (40 mg total) by mouth daily    Essential hypertension  Comments:  Zestoretic added will take in pm, Imdur in the am  Orders:  -     lisinopril-hydrochlorothiazide (PRINZIDE,ZESTORETIC) 20-25 MG per tablet; Take 1 tablet by mouth daily    Obesity (BMI 30-39  9)  Comments:  counciled on diet and exercise    Hyperlipidemia associated with type 2 diabetes mellitus ([place] [address] )  Comments:  Pravastatin reordered  Orders:  -     Discontinue: pravastatin (PRAVACHOL) 20 mg tablet; Take 1 tablet (20 mg total) by mouth daily  -     pravastatin (PRAVACHOL) 40 mg tablet; Take 1 tablet (40 mg total) by mouth daily    Vitamin D deficiency  Comments:  vitamin D ordererd  Orders:  -     ergocalciferol (VITAMIN D2) 50,000 units; Take 1 capsule (50,000 Units total) by mouth once a week          Subjective:      Patient ID: Leonia Harada is a 56 y o  male  Martha Tinajero is a 61-year-old male seen in the office today for an annual visit and follow-up of his diabetes  He presents with complaints of diarrhea vomiting and shortness of breath x3 days  He states that he lives with his siblings, one of whom is suffering from pneumonia  He has had 1 day of missed work due to his illness, but reports overall symptoms are improving with rest and hydration    He is still eating and taking his medications as prescribed  Lab work was reviewed at this visit  Hemoglobin A1c is increased to 11 5  Patient is scheduled to follow-up with endocrinology today at 3:00 p m  Stan Olmos Patient reports compliance with current insulin regimen and checking blood sugars at home  Patient is hypertensive at today's visit  Patient educated on low carbohydrate and low sodium diet  He reports lots of stressors with not feeling well and a recent death of his sibling 2 weeks ago  The following portions of the patient's history were reviewed and updated as appropriate: He  has no past medical history on file  He   Patient Active Problem List    Diagnosis Date Noted    Annual physical exam 03/12/2020    Vitamin D deficiency 12/05/2019    Mononeuropathy, unspecified 11/07/2019    Other and unspecified noninfectious gastroenteritis and colitis 11/07/2019    Other and unspecified hyperlipidemia 11/07/2019    Proteinuria 11/07/2019    Tobacco abuse counseling 11/07/2019    Obesity (BMI 30-39 9) 08/08/2019    Positive depression screening 08/08/2019    Bilateral lower extremity edema 11/27/2018    Callous ulcer, limited to breakdown of skin ([facility] ) 10/30/2018    Non-intractable cyclical vomiting with nausea 10/04/2018    Fatigue due to exposure 04/14/2017    Essential hypertension 04/13/2017    Hyperlipidemia associated with type 2 diabetes mellitus ([facility] ) 04/13/2017    Methadone maintenance therapy patient ([facility] ) 04/13/2017    Type 2 diabetes mellitus with diabetic polyneuropathy ([facility] ) 04/13/2017    Chronic hepatitis C without hepatic coma ([facility] ) 02/27/2017     He  has no past surgical history on file  His family history includes Acute lymphoblastic leukemia in his father; Cancer in his sister; Diabetes in his mother; Hypertension in his father; Leukemia in his father  He  reports that he has quit smoking   He has never used smokeless tobacco  He reports that he does not drink alcohol or use drugs  Current Outpatient Medications   Medication Sig Dispense Refill    Blood Glucose Monitoring Suppl (Jasmine Saenz) w/Device KIT Use to test blood sugars 3 times daily 1 kit 0    ergocalciferol (VITAMIN D2) 50,000 units Take 1 capsule (50,000 Units total) by mouth once a week 8 capsule 0    furosemide (LASIX) 40 mg tablet Take 1 tablet (40 mg total) by mouth daily 90 tablet 3    gabapentin (NEURONTIN) 100 mg capsule Take 1 capsule (100 mg total) by mouth 3 (three) times a day 90 capsule 3    glucose blood (ONETOUCH VERIO) test strip Use to test blood sugars 3 times daily 300 each 1    Insulin Pen Needle 31G X 5 MM MISC by Does not apply route daily Pt to inject 4 X daily 100 each 5    Insulin Pen Needle 31G X 5 MM MISC 30 units sq 2X daily 100 each 3    Insulin Regular Human, Conc, (HUMULIN R U-500 KWIKPEN) 500 units/mL CONCENTRATED U-500 injection pen Inject 60 units with breakfast and 70 units with dinner  20 units with 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 4 pen 3    isosorbide mononitrate (IMDUR) 120 mg 24 hr tablet Take 1 tablet (120 mg total) by mouth daily 90 tablet 3    metFORMIN (GLUCOPHAGE-XR) 500 mg 24 hr tablet TAKE 1 TABLET BY MOUTH TWICE DAILY WITH MEALS 60 tablet 0    methadone (DOLOPHINE) 10 MG/5ML solution Take 70 mg by mouth      omeprazole (PriLOSEC) 20 mg delayed release capsule Take 1 capsule (20 mg total) by mouth daily 90 capsule 3    potassium chloride (K-DUR,KLOR-CON) 20 mEq tablet Take 2 tablets (40 mEq total) by mouth daily 180 tablet 3    pravastatin (PRAVACHOL) 40 mg tablet Take 1 tablet (40 mg total) by mouth daily 90 tablet 3    erythromycin (ILOTYCIN) ophthalmic ointment 0 5 inches daily at bedtime      lisinopril-hydrochlorothiazide (PRINZIDE,ZESTORETIC) 20-25 MG per tablet Take 1 tablet by mouth daily 30 tablet 3     No current facility-administered medications for this visit        Current Outpatient Medications on File Prior to Visit   Medication Sig    Blood Glucose Monitoring Suppl (Chuck Dust) w/Device KIT Use to test blood sugars 3 times daily    furosemide (LASIX) 40 mg tablet Take 1 tablet (40 mg total) by mouth daily    gabapentin (NEURONTIN) 100 mg capsule Take 1 capsule (100 mg total) by mouth 3 (three) times a day    glucose blood (ONETOUCH VERIO) test strip Use to test blood sugars 3 times daily    Insulin Pen Needle 31G X 5 MM MISC by Does not apply route daily Pt to inject 4 X daily    Insulin Pen Needle 31G X 5 MM MISC 30 units sq 2X daily    Insulin Regular Human, Conc, (HUMULIN R U-500 KWIKPEN) 500 units/mL CONCENTRATED U-500 injection pen Inject 60 units with breakfast and 70 units with dinner  20 units with 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    isosorbide mononitrate (IMDUR) 120 mg 24 hr tablet Take 1 tablet (120 mg total) by mouth daily    metFORMIN (GLUCOPHAGE-XR) 500 mg 24 hr tablet TAKE 1 TABLET BY MOUTH TWICE DAILY WITH MEALS    methadone (DOLOPHINE) 10 MG/5ML solution Take 70 mg by mouth    omeprazole (PriLOSEC) 20 mg delayed release capsule Take 1 capsule (20 mg total) by mouth daily    potassium chloride (K-DUR,KLOR-CON) 20 mEq tablet Take 2 tablets (40 mEq total) by mouth daily    [DISCONTINUED] ergocalciferol (VITAMIN D2) 50,000 units Take 1 capsule (50,000 Units total) by mouth once a week    [DISCONTINUED] isosorbide mononitrate (IMDUR) 120 mg 24 hr tablet TAKE 1 TABLET BY MOUTH ONCE DAILY    [DISCONTINUED] pravastatin (PRAVACHOL) 20 mg tablet Take 1 tablet (20 mg total) by mouth daily    erythromycin (ILOTYCIN) ophthalmic ointment 0 5 inches daily at bedtime     No current facility-administered medications on file prior to visit  He is allergic to varenicline       Lab Results   Component Value Date    HGBA1C 11 5 (H) 03/04/2020     Lab Results   Component Value Date    GLUF 332 (H) 03/04/2020       Review of Systems   Constitutional: Positive for activity change and fever  HENT: Negative  Eyes: Negative  Respiratory: Positive for shortness of breath  Cardiovascular: Negative  Gastrointestinal: Positive for abdominal pain, diarrhea and nausea  Endocrine: Negative  Genitourinary: Negative  Musculoskeletal: Negative  Skin: Negative  Allergic/Immunologic: Negative  Neurological: Negative  Hematological: Negative  Psychiatric/Behavioral: Negative  All other systems reviewed and are negative  BMI Counseling: Body mass index is 31 93 kg/m²  Discussed the patient's BMI with him  The BMI is above normal  Nutrition recommendations include reducing portion sizes, decreasing overall calorie intake, 3-5 servings of fruits/vegetables daily, reducing fast food intake, consuming healthier snacks, decreasing soda and/or juice intake and increasing intake of lean protein  Exercise recommendations include moderate aerobic physical activity for 150 minutes/week and exercising 3-5 times per week  Objective:      /90 (BP Location: Left arm)   Pulse 102   Temp 99 1 °F (37 3 °C)   Ht 5' 8" (1 727 m)   Wt 95 3 kg (210 lb)   SpO2 98%   BMI 31 93 kg/m²          Physical Exam   Constitutional: He is oriented to person, place, and time  He appears well-developed and well-nourished  HENT:   Head: Normocephalic and atraumatic  Eyes: Pupils are equal, round, and reactive to light  EOM are normal    Neck: Normal range of motion  Neck supple  Cardiovascular: Normal rate and regular rhythm  Pulmonary/Chest: No accessory muscle usage  He has decreased breath sounds in the right lower field  Abdominal: Soft  Bowel sounds are normal    Musculoskeletal: Normal range of motion  Neurological: He is alert and oriented to person, place, and time  Skin: Skin is warm and dry  Nursing note and vitals reviewed

## 2020-03-12 ENCOUNTER — OFFICE VISIT (OUTPATIENT)
Dept: FAMILY MEDICINE CLINIC | Facility: CLINIC | Age: 60
End: 2020-03-12
Payer: COMMERCIAL

## 2020-03-12 ENCOUNTER — OFFICE VISIT (OUTPATIENT)
Dept: ENDOCRINOLOGY | Facility: CLINIC | Age: 60
End: 2020-03-12
Payer: COMMERCIAL

## 2020-03-12 VITALS
TEMPERATURE: 99.1 F | WEIGHT: 210 LBS | BODY MASS INDEX: 31.83 KG/M2 | DIASTOLIC BLOOD PRESSURE: 90 MMHG | HEART RATE: 102 BPM | HEIGHT: 68 IN | OXYGEN SATURATION: 98 % | SYSTOLIC BLOOD PRESSURE: 160 MMHG

## 2020-03-12 VITALS
DIASTOLIC BLOOD PRESSURE: 82 MMHG | HEART RATE: 103 BPM | HEIGHT: 68 IN | SYSTOLIC BLOOD PRESSURE: 150 MMHG | WEIGHT: 208 LBS | BODY MASS INDEX: 31.52 KG/M2

## 2020-03-12 DIAGNOSIS — E66.9 OBESITY (BMI 30-39.9): ICD-10-CM

## 2020-03-12 DIAGNOSIS — I10 ESSENTIAL HYPERTENSION: ICD-10-CM

## 2020-03-12 DIAGNOSIS — E55.9 VITAMIN D DEFICIENCY: ICD-10-CM

## 2020-03-12 DIAGNOSIS — E11.69 HYPERLIPIDEMIA ASSOCIATED WITH TYPE 2 DIABETES MELLITUS (HCC): ICD-10-CM

## 2020-03-12 DIAGNOSIS — E78.5 HYPERLIPIDEMIA ASSOCIATED WITH TYPE 2 DIABETES MELLITUS (HCC): ICD-10-CM

## 2020-03-12 DIAGNOSIS — E11.42 TYPE 2 DIABETES MELLITUS WITH DIABETIC POLYNEUROPATHY, WITH LONG-TERM CURRENT USE OF INSULIN (HCC): ICD-10-CM

## 2020-03-12 DIAGNOSIS — Z23 ENCOUNTER FOR IMMUNIZATION: ICD-10-CM

## 2020-03-12 DIAGNOSIS — Z79.4 TYPE 2 DIABETES MELLITUS WITH DIABETIC POLYNEUROPATHY, WITH LONG-TERM CURRENT USE OF INSULIN (HCC): ICD-10-CM

## 2020-03-12 DIAGNOSIS — E78.2 MIXED HYPERLIPIDEMIA: ICD-10-CM

## 2020-03-12 DIAGNOSIS — E11.42 TYPE 2 DIABETES MELLITUS WITH DIABETIC POLYNEUROPATHY, WITH LONG-TERM CURRENT USE OF INSULIN (HCC): Primary | ICD-10-CM

## 2020-03-12 DIAGNOSIS — Z00.00 ANNUAL PHYSICAL EXAM: Primary | ICD-10-CM

## 2020-03-12 DIAGNOSIS — Z79.4 TYPE 2 DIABETES MELLITUS WITH DIABETIC POLYNEUROPATHY, WITH LONG-TERM CURRENT USE OF INSULIN (HCC): Primary | ICD-10-CM

## 2020-03-12 PROCEDURE — 3079F DIAST BP 80-89 MM HG: CPT | Performed by: PHYSICIAN ASSISTANT

## 2020-03-12 PROCEDURE — 3077F SYST BP >= 140 MM HG: CPT | Performed by: PHYSICIAN ASSISTANT

## 2020-03-12 PROCEDURE — 99396 PREV VISIT EST AGE 40-64: CPT | Performed by: NURSE PRACTITIONER

## 2020-03-12 PROCEDURE — 99214 OFFICE O/P EST MOD 30 MIN: CPT | Performed by: PHYSICIAN ASSISTANT

## 2020-03-12 PROCEDURE — 3046F HEMOGLOBIN A1C LEVEL >9.0%: CPT | Performed by: NURSE PRACTITIONER

## 2020-03-12 PROCEDURE — 3066F NEPHROPATHY DOC TX: CPT | Performed by: PHYSICIAN ASSISTANT

## 2020-03-12 PROCEDURE — 90750 HZV VACC RECOMBINANT IM: CPT

## 2020-03-12 PROCEDURE — 1036F TOBACCO NON-USER: CPT | Performed by: PHYSICIAN ASSISTANT

## 2020-03-12 PROCEDURE — 90471 IMMUNIZATION ADMIN: CPT

## 2020-03-12 PROCEDURE — 3060F POS MICROALBUMINURIA REV: CPT | Performed by: PHYSICIAN ASSISTANT

## 2020-03-12 PROCEDURE — 3008F BODY MASS INDEX DOCD: CPT | Performed by: PHYSICIAN ASSISTANT

## 2020-03-12 PROCEDURE — 3046F HEMOGLOBIN A1C LEVEL >9.0%: CPT | Performed by: PHYSICIAN ASSISTANT

## 2020-03-12 RX ORDER — PRAVASTATIN SODIUM 40 MG
40 TABLET ORAL DAILY
Qty: 90 TABLET | Refills: 3 | Status: SHIPPED | OUTPATIENT
Start: 2020-03-12 | End: 2020-06-05 | Stop reason: SDUPTHER

## 2020-03-12 RX ORDER — PRAVASTATIN SODIUM 20 MG
20 TABLET ORAL DAILY
Qty: 90 TABLET | Refills: 3 | Status: SHIPPED | OUTPATIENT
Start: 2020-03-12 | End: 2020-03-12 | Stop reason: SDUPTHER

## 2020-03-12 RX ORDER — ERGOCALCIFEROL 1.25 MG/1
50000 CAPSULE ORAL WEEKLY
Qty: 8 CAPSULE | Refills: 0 | Status: SHIPPED | OUTPATIENT
Start: 2020-03-12 | End: 2020-07-09 | Stop reason: SDUPTHER

## 2020-03-12 RX ORDER — METFORMIN HYDROCHLORIDE 500 MG/1
1000 TABLET, EXTENDED RELEASE ORAL 2 TIMES DAILY WITH MEALS
Qty: 60 TABLET | Refills: 0
Start: 2020-03-12 | End: 2020-04-22

## 2020-03-12 RX ORDER — LISINOPRIL AND HYDROCHLOROTHIAZIDE 25; 20 MG/1; MG/1
1 TABLET ORAL DAILY
Qty: 30 TABLET | Refills: 3 | Status: SHIPPED | OUTPATIENT
Start: 2020-03-12 | End: 2020-06-05 | Stop reason: SDUPTHER

## 2020-03-12 NOTE — PATIENT INSTRUCTIONS
Hypoglycemia instructions   Truong Baxter  3/12/2020  [id number]    Low Blood Sugar    Steps to treat low blood sugar  1  Test blood sugar if you have symptoms of low blood sugar:   Low Blood Sugar Symptoms:  o Sweaty  o Dizzy  o Rapid heartbeat  o Shaky  o Bad mood  o Hungry      2  Treat blood sugar less than 70 with 15 grams of fast-acting carbohydrate:   Examples of 15 grams Fast-Acting Carbohydrate:  o 4 oz juice  o 4 oz regular soda  o 3-4 glucose tablets (chew)  o 3-4 hard candies (chew)          3  Wait 15 minutes and test your blood sugar again     4   If blood sugar is less than 100, repeat steps 2-3     5  When your blood sugar is 100 or more, eat a snack if it will be longer than one hour until your next meal  The snack should be 15 grams of carbohydrate and a protein:   Examples of snacks:  o ½ sandwich  o 6 crackers with cheese  o Piece of fruit with cheese or peanut butter  o 6 crackers with peanut butter

## 2020-03-17 ENCOUNTER — OFFICE VISIT (OUTPATIENT)
Dept: FAMILY MEDICINE CLINIC | Facility: CLINIC | Age: 60
End: 2020-03-17
Payer: COMMERCIAL

## 2020-03-17 ENCOUNTER — TELEPHONE (OUTPATIENT)
Dept: GASTROENTEROLOGY | Facility: CLINIC | Age: 60
End: 2020-03-17

## 2020-03-17 VITALS
HEIGHT: 68 IN | OXYGEN SATURATION: 98 % | BODY MASS INDEX: 30.62 KG/M2 | WEIGHT: 202 LBS | SYSTOLIC BLOOD PRESSURE: 140 MMHG | HEART RATE: 90 BPM | DIASTOLIC BLOOD PRESSURE: 80 MMHG | TEMPERATURE: 98.8 F

## 2020-03-17 DIAGNOSIS — R19.7 DIARRHEA, UNSPECIFIED TYPE: ICD-10-CM

## 2020-03-17 DIAGNOSIS — R68.89 FLU-LIKE SYMPTOMS: Primary | ICD-10-CM

## 2020-03-17 DIAGNOSIS — J02.9 SORE THROAT: ICD-10-CM

## 2020-03-17 DIAGNOSIS — M79.10 MYALGIA: ICD-10-CM

## 2020-03-17 PROCEDURE — 3008F BODY MASS INDEX DOCD: CPT | Performed by: NURSE PRACTITIONER

## 2020-03-17 PROCEDURE — 3066F NEPHROPATHY DOC TX: CPT | Performed by: NURSE PRACTITIONER

## 2020-03-17 PROCEDURE — 3077F SYST BP >= 140 MM HG: CPT | Performed by: NURSE PRACTITIONER

## 2020-03-17 PROCEDURE — 1036F TOBACCO NON-USER: CPT | Performed by: NURSE PRACTITIONER

## 2020-03-17 PROCEDURE — 3079F DIAST BP 80-89 MM HG: CPT | Performed by: NURSE PRACTITIONER

## 2020-03-17 PROCEDURE — 99213 OFFICE O/P EST LOW 20 MIN: CPT | Performed by: NURSE PRACTITIONER

## 2020-03-17 PROCEDURE — 3060F POS MICROALBUMINURIA REV: CPT | Performed by: NURSE PRACTITIONER

## 2020-03-17 PROCEDURE — 3046F HEMOGLOBIN A1C LEVEL >9.0%: CPT | Performed by: NURSE PRACTITIONER

## 2020-03-17 RX ORDER — OSELTAMIVIR PHOSPHATE 75 MG/1
75 CAPSULE ORAL 2 TIMES DAILY
Qty: 10 CAPSULE | Refills: 0 | Status: SHIPPED | OUTPATIENT
Start: 2020-03-17 | End: 2020-03-22

## 2020-03-17 RX ORDER — AMOXICILLIN AND CLAVULANATE POTASSIUM 875; 125 MG/1; MG/1
1 TABLET, FILM COATED ORAL EVERY 12 HOURS SCHEDULED
Qty: 14 TABLET | Refills: 0 | Status: SHIPPED | OUTPATIENT
Start: 2020-03-17 | End: 2020-03-24

## 2020-03-17 NOTE — LETTER
March 17, 2020     Patient: Rosa Wen   YOB: 1960   Date of Visit: 3/17/2020       To Whom it May Concern:    Rosa Wen is under my professional care  He was seen in my office on 3/17/2020  He may return to work on March 23, 2020 due to flu like symptoms     Please excuse pt from work on 3/18/20  And 3/19/20    If you have any questions or concerns, please don't hesitate to call           Sincerely,          VERONICA Lees        CC: No Recipients

## 2020-03-17 NOTE — PROGRESS NOTES
Assessment/Plan:    No problem-specific Assessment & Plan notes found for this encounter  Diagnoses and all orders for this visit:    Flu-like symptoms  Comments:  flu swab obtained Tamiflu Rx provided  Orders:  -     Cancel: Influenza A/B and RSV PCR; Future  -     oseltamivir (TAMIFLU) 75 mg capsule; Take 1 capsule (75 mg total) by mouth 2 (two) times a day for 5 days  -     Influenza A/B and RSV PCR; Future    Myalgia  Comments:  flu swab obtained Tamiflu Rx provided  Orders:  -     oseltamivir (TAMIFLU) 75 mg capsule; Take 1 capsule (75 mg total) by mouth 2 (two) times a day for 5 days  -     amoxicillin-clavulanate (AUGMENTIN) 875-125 mg per tablet; Take 1 tablet by mouth every 12 (twelve) hours for 7 days    Diarrhea, unspecified type  Comments:  resolving, brat diet  Orders:  -     oseltamivir (TAMIFLU) 75 mg capsule; Take 1 capsule (75 mg total) by mouth 2 (two) times a day for 5 days  -     amoxicillin-clavulanate (AUGMENTIN) 875-125 mg per tablet; Take 1 tablet by mouth every 12 (twelve) hours for 7 days    Sore throat  Comments:  rx for augmentin provided  Orders:  -     oseltamivir (TAMIFLU) 75 mg capsule; Take 1 capsule (75 mg total) by mouth 2 (two) times a day for 5 days  -     amoxicillin-clavulanate (AUGMENTIN) 875-125 mg per tablet; Take 1 tablet by mouth every 12 (twelve) hours for 7 days          Subjective:      Patient ID: Pauly Winchester is a 56 y o  male  Subjective    Pauly Winchester is a 56 y o  male who presents for evaluation of influenza like symptoms  Symptoms include chills, headache, myalgias, productive cough and fever and have been present for 2 days  He has tried to alleviate the symptoms with acetaminophen and rest with minimal relief  High risk factors for influenza complications: immunocompromised and co-morbid illness  Pt was also exposed to sister with influenza and pneumonia approx 1 week ago                           The following portions of the patient's history were reviewed and updated as appropriate: He  has no past medical history on file  He   Patient Active Problem List    Diagnosis Date Noted    Annual physical exam 03/12/2020    Vitamin D deficiency 12/05/2019    Mononeuropathy, unspecified 11/07/2019    Other and unspecified noninfectious gastroenteritis and colitis 11/07/2019    Mixed hyperlipidemia 11/07/2019    Proteinuria 11/07/2019    Tobacco abuse counseling 11/07/2019    Obesity (BMI 30-39 9) 08/08/2019    Positive depression screening 08/08/2019    Bilateral lower extremity edema 11/27/2018    Callous ulcer, limited to breakdown of skin ([facility] ) 10/30/2018    Non-intractable cyclical vomiting with nausea 10/04/2018    Fatigue due to exposure 04/14/2017    Essential hypertension 04/13/2017    Hyperlipidemia associated with type 2 diabetes mellitus ([facility] ) 04/13/2017    Methadone maintenance therapy patient ([place] [address] ) 04/13/2017    Type 2 diabetes mellitus with diabetic polyneuropathy ([place] [address] ) 04/13/2017    Chronic hepatitis C without hepatic coma ([place] [address] ) 02/27/2017     He  has no past surgical history on file  His family history includes Acute lymphoblastic leukemia in his father; Cancer in his sister; Diabetes in his mother; Hypertension in his father; Leukemia in his father  He  reports that he has quit smoking  He has never used smokeless tobacco  He reports that he does not drink alcohol or use drugs    Current Outpatient Medications   Medication Sig Dispense Refill    Blood Glucose Monitoring Suppl (ONETOUCH VERIO) w/Device KIT Use to test blood sugars 3 times daily 1 kit 0    Empagliflozin (Jardiance) 25 MG TABS Take 1 tablet (25 mg total) by mouth every morning 30 tablet 5    ergocalciferol (VITAMIN D2) 50,000 units Take 1 capsule (50,000 Units total) by mouth once a week 8 capsule 0    erythromycin (ILOTYCIN) ophthalmic ointment 0 5 inches daily at bedtime      furosemide (LASIX) 40 mg tablet Take 1 tablet (40 mg total) by mouth daily 90 tablet 3    gabapentin (NEURONTIN) 100 mg capsule Take 1 capsule (100 mg total) by mouth 3 (three) times a day 90 capsule 3    glucose blood (ONETOUCH VERIO) test strip Use to test blood sugars 3 times daily 300 each 1    Insulin Pen Needle 31G X 5 MM MISC by Does not apply route daily Pt to inject 4 X daily 100 each 5    Insulin Pen Needle 31G X 5 MM MISC 30 units sq 2X daily 100 each 3    Insulin Regular Human, Conc, (HUMULIN R U-500 KWIKPEN) 500 units/mL CONCENTRATED U-500 injection pen Inject 60 units with breakfast and 70 units with dinner  20 units with 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 4 pen 3    isosorbide mononitrate (IMDUR) 120 mg 24 hr tablet Take 1 tablet (120 mg total) by mouth daily 90 tablet 3    lisinopril-hydrochlorothiazide (PRINZIDE,ZESTORETIC) 20-25 MG per tablet Take 1 tablet by mouth daily 30 tablet 3    metFORMIN (GLUCOPHAGE-XR) 500 mg 24 hr tablet Take 2 tablets (1,000 mg total) by mouth 2 (two) times a day with meals 60 tablet 0    methadone (DOLOPHINE) 10 MG/5ML solution Take 70 mg by mouth      omeprazole (PriLOSEC) 20 mg delayed release capsule Take 1 capsule (20 mg total) by mouth daily 90 capsule 3    potassium chloride (K-DUR,KLOR-CON) 20 mEq tablet Take 2 tablets (40 mEq total) by mouth daily 180 tablet 3    pravastatin (PRAVACHOL) 40 mg tablet Take 1 tablet (40 mg total) by mouth daily 90 tablet 3    amoxicillin-clavulanate (AUGMENTIN) 875-125 mg per tablet Take 1 tablet by mouth every 12 (twelve) hours for 7 days 14 tablet 0    oseltamivir (TAMIFLU) 75 mg capsule Take 1 capsule (75 mg total) by mouth 2 (two) times a day for 5 days 10 capsule 0     No current facility-administered medications for this visit        Current Outpatient Medications on File Prior to Visit   Medication Sig    Blood Glucose Monitoring Suppl (ONETOUCH VERIO) w/Device KIT Use to test blood sugars 3 times daily    Empagliflozin (Jardiance) 25 MG TABS Take 1 tablet (25 mg total) by mouth every morning    ergocalciferol (VITAMIN D2) 50,000 units Take 1 capsule (50,000 Units total) by mouth once a week    erythromycin (ILOTYCIN) ophthalmic ointment 0 5 inches daily at bedtime    furosemide (LASIX) 40 mg tablet Take 1 tablet (40 mg total) by mouth daily    gabapentin (NEURONTIN) 100 mg capsule Take 1 capsule (100 mg total) by mouth 3 (three) times a day    glucose blood (ONETOUCH VERIO) test strip Use to test blood sugars 3 times daily    Insulin Pen Needle 31G X 5 MM MISC by Does not apply route daily Pt to inject 4 X daily    Insulin Pen Needle 31G X 5 MM MISC 30 units sq 2X daily    Insulin Regular Human, Conc, (HUMULIN R U-500 KWIKPEN) 500 units/mL CONCENTRATED U-500 injection pen Inject 60 units with breakfast and 70 units with dinner  20 units with 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    isosorbide mononitrate (IMDUR) 120 mg 24 hr tablet Take 1 tablet (120 mg total) by mouth daily    lisinopril-hydrochlorothiazide (PRINZIDE,ZESTORETIC) 20-25 MG per tablet Take 1 tablet by mouth daily    metFORMIN (GLUCOPHAGE-XR) 500 mg 24 hr tablet Take 2 tablets (1,000 mg total) by mouth 2 (two) times a day with meals    methadone (DOLOPHINE) 10 MG/5ML solution Take 70 mg by mouth    omeprazole (PriLOSEC) 20 mg delayed release capsule Take 1 capsule (20 mg total) by mouth daily    potassium chloride (K-DUR,KLOR-CON) 20 mEq tablet Take 2 tablets (40 mEq total) by mouth daily    pravastatin (PRAVACHOL) 40 mg tablet Take 1 tablet (40 mg total) by mouth daily     No current facility-administered medications on file prior to visit  He is allergic to varenicline       Review of Systems   Constitutional: Positive for fever  HENT: Positive for congestion, postnasal drip, rhinorrhea and sore throat  Negative for ear pain  Respiratory: Positive for cough  Negative for shortness of breath and wheezing  Gastrointestinal: Positive for diarrhea  Musculoskeletal: Positive for myalgias  Objective:      /80   Pulse 90   Temp 98 8 °F (37 1 °C)   Ht 5' 8" (1 727 m)   Wt 91 6 kg (202 lb)   SpO2 98%   BMI 30 71 kg/m²          Physical Exam   Constitutional: He appears ill  HENT:   Right Ear: Tympanic membrane is not erythematous  No middle ear effusion  Left Ear: Tympanic membrane is not erythematous  No middle ear effusion  Nose: Mucosal edema and rhinorrhea present  Right sinus exhibits no maxillary sinus tenderness and no frontal sinus tenderness  Left sinus exhibits no maxillary sinus tenderness and no frontal sinus tenderness  Mouth/Throat: Posterior oropharyngeal erythema present  Pulmonary/Chest: Effort normal  No tachypnea  No respiratory distress  He has decreased breath sounds in the right middle field, the right lower field, the left middle field and the left lower field  Pt c/o burning in his chest   Lymphadenopathy:        Head (right side): No submandibular adenopathy present  Head (left side): No submandibular adenopathy present

## 2020-03-23 NOTE — PATIENT INSTRUCTIONS
Hypoglycemia instructions   Pauly   12/5/2019  [id number]    Low Blood Sugar    Steps to treat low blood sugar  1  Test blood sugar if you have symptoms of low blood sugar:   Low Blood Sugar Symptoms:  o Sweaty  o Dizzy  o Rapid heartbeat  o Shaky  o Bad mood  o Hungry      2  Treat blood sugar less than 70 with 15 grams of fast-acting carbohydrate:   Examples of 15 grams Fast-Acting Carbohydrate:  o 4 oz juice  o 4 oz regular soda  o 3-4 glucose tablets (chew)  o 3-4 hard candies (chew)          3  Wait 15 minutes and test your blood sugar again     4   If blood sugar is less than 100, repeat steps 2-3     5  When your blood sugar is 100 or more, eat a snack if it will be longer than one hour until your next meal  The snack should be 15 grams of carbohydrate and a protein:   Examples of snacks:  o ½ sandwich  o 6 crackers with cheese  o Piece of fruit with cheese or peanut butter  o 6 crackers with peanut butter
normal for race

## 2020-03-31 DIAGNOSIS — E11.42 DIABETIC POLYNEUROPATHY ASSOCIATED WITH TYPE 2 DIABETES MELLITUS (HCC): ICD-10-CM

## 2020-03-31 RX ORDER — GABAPENTIN 100 MG/1
CAPSULE ORAL
Qty: 90 CAPSULE | Refills: 0 | Status: SHIPPED | OUTPATIENT
Start: 2020-03-31 | End: 2020-06-02

## 2020-04-09 ENCOUNTER — TELEMEDICINE (OUTPATIENT)
Dept: FAMILY MEDICINE CLINIC | Facility: CLINIC | Age: 60
End: 2020-04-09
Payer: COMMERCIAL

## 2020-04-09 DIAGNOSIS — Z20.828 EXPOSURE TO SARS-ASSOCIATED CORONAVIRUS: Primary | ICD-10-CM

## 2020-04-09 DIAGNOSIS — R05.9 COUGH: ICD-10-CM

## 2020-04-09 DIAGNOSIS — Z20.828 EXPOSURE TO SARS-ASSOCIATED CORONAVIRUS: ICD-10-CM

## 2020-04-09 DIAGNOSIS — R50.9 FEVER, UNSPECIFIED FEVER CAUSE: ICD-10-CM

## 2020-04-09 DIAGNOSIS — M79.10 MYALGIA: ICD-10-CM

## 2020-04-09 DIAGNOSIS — R07.89 BURNING CHEST PAIN: ICD-10-CM

## 2020-04-09 PROCEDURE — 99442 PR PHYS/QHP TELEPHONE EVALUATION 11-20 MIN: CPT | Performed by: NURSE PRACTITIONER

## 2020-04-09 PROCEDURE — 87635 SARS-COV-2 COVID-19 AMP PRB: CPT

## 2020-04-11 LAB — SARS-COV-2 RNA SPEC QL NAA+PROBE: NOT DETECTED

## 2020-04-21 ENCOUNTER — TELEPHONE (OUTPATIENT)
Dept: FAMILY MEDICINE CLINIC | Facility: CLINIC | Age: 60
End: 2020-04-21

## 2020-04-22 DIAGNOSIS — Z79.4 TYPE 2 DIABETES MELLITUS WITH DIABETIC POLYNEUROPATHY, WITH LONG-TERM CURRENT USE OF INSULIN (HCC): ICD-10-CM

## 2020-04-22 DIAGNOSIS — E11.42 TYPE 2 DIABETES MELLITUS WITH DIABETIC POLYNEUROPATHY, WITH LONG-TERM CURRENT USE OF INSULIN (HCC): ICD-10-CM

## 2020-04-22 RX ORDER — METFORMIN HYDROCHLORIDE 500 MG/1
TABLET, EXTENDED RELEASE ORAL
Qty: 60 TABLET | Refills: 0 | Status: SHIPPED | OUTPATIENT
Start: 2020-04-22 | End: 2020-06-17

## 2020-05-21 ENCOUNTER — APPOINTMENT (EMERGENCY)
Dept: RADIOLOGY | Facility: HOSPITAL | Age: 60
End: 2020-05-21
Payer: COMMERCIAL

## 2020-05-21 ENCOUNTER — CLINICAL SUPPORT (OUTPATIENT)
Dept: FAMILY MEDICINE CLINIC | Facility: CLINIC | Age: 60
End: 2020-05-21
Payer: COMMERCIAL

## 2020-05-21 ENCOUNTER — HOSPITAL ENCOUNTER (EMERGENCY)
Facility: HOSPITAL | Age: 60
Discharge: HOME | End: 2020-05-21
Attending: EMERGENCY MEDICINE | Admitting: EMERGENCY MEDICINE
Payer: COMMERCIAL

## 2020-05-21 VITALS
DIASTOLIC BLOOD PRESSURE: 80 MMHG | BODY MASS INDEX: 29.83 KG/M2 | WEIGHT: 196.21 LBS | OXYGEN SATURATION: 99 % | SYSTOLIC BLOOD PRESSURE: 162 MMHG | TEMPERATURE: 97.8 F | HEART RATE: 71 BPM | RESPIRATION RATE: 19 BRPM

## 2020-05-21 DIAGNOSIS — E86.0 DEHYDRATION: ICD-10-CM

## 2020-05-21 DIAGNOSIS — R06.00 DYSPNEA: Primary | ICD-10-CM

## 2020-05-21 DIAGNOSIS — R53.83 FATIGUE: ICD-10-CM

## 2020-05-21 DIAGNOSIS — Z23 ENCOUNTER FOR IMMUNIZATION: Primary | ICD-10-CM

## 2020-05-21 LAB
ALBUMIN SERPL BCP-MCNC: 3.8 G/DL (ref 3.5–5)
ALP SERPL-CCNC: 128 U/L (ref 46–116)
ALT SERPL W P-5'-P-CCNC: 38 U/L (ref 12–78)
ANION GAP SERPL CALCULATED.3IONS-SCNC: 9 MMOL/L (ref 4–13)
APTT PPP: 36 SECONDS (ref 23–37)
AST SERPL W P-5'-P-CCNC: 22 U/L (ref 5–45)
ATRIAL RATE: 78 BPM
BASOPHILS # BLD AUTO: 0.05 THOUSANDS/ΜL (ref 0–0.1)
BASOPHILS NFR BLD AUTO: 1 % (ref 0–1)
BILIRUB SERPL-MCNC: 1.1 MG/DL (ref 0.2–1)
BUN SERPL-MCNC: 28 MG/DL (ref 5–25)
CALCIUM SERPL-MCNC: 9.1 MG/DL (ref 8.3–10.1)
CHLORIDE SERPL-SCNC: 95 MMOL/L (ref 100–108)
CO2 SERPL-SCNC: 26 MMOL/L (ref 21–32)
CREAT SERPL-MCNC: 1.32 MG/DL (ref 0.6–1.3)
EOSINOPHIL # BLD AUTO: 0.08 THOUSAND/ΜL (ref 0–0.61)
EOSINOPHIL NFR BLD AUTO: 1 % (ref 0–6)
ERYTHROCYTE [DISTWIDTH] IN BLOOD BY AUTOMATED COUNT: 12.3 % (ref 11.6–15.1)
GFR SERPL CREATININE-BSD FRML MDRD: 57 ML/MIN/1.73SQ M
GLUCOSE SERPL-MCNC: 460 MG/DL (ref 65–140)
HCT VFR BLD AUTO: 42 % (ref 36.5–49.3)
HGB BLD-MCNC: 14.6 G/DL (ref 12–17)
HOLD SPECIMEN: NORMAL
IMM GRANULOCYTES # BLD AUTO: 0.02 THOUSAND/UL (ref 0–0.2)
IMM GRANULOCYTES NFR BLD AUTO: 0 % (ref 0–2)
INR PPP: 1.06 (ref 0.84–1.19)
LIPASE SERPL-CCNC: 89 U/L (ref 73–393)
LYMPHOCYTES # BLD AUTO: 1.18 THOUSANDS/ΜL (ref 0.6–4.47)
LYMPHOCYTES NFR BLD AUTO: 17 % (ref 14–44)
MCH RBC QN AUTO: 30.4 PG (ref 26.8–34.3)
MCHC RBC AUTO-ENTMCNC: 34.8 G/DL (ref 31.4–37.4)
MCV RBC AUTO: 87 FL (ref 82–98)
MONOCYTES # BLD AUTO: 0.46 THOUSAND/ΜL (ref 0.17–1.22)
MONOCYTES NFR BLD AUTO: 6 % (ref 4–12)
NEUTROPHILS # BLD AUTO: 5.36 THOUSANDS/ΜL (ref 1.85–7.62)
NEUTS SEG NFR BLD AUTO: 75 % (ref 43–75)
NRBC BLD AUTO-RTO: 0 /100 WBCS
NT-PROBNP SERPL-MCNC: 69 PG/ML
P AXIS: 56 DEGREES
PLATELET # BLD AUTO: 146 THOUSANDS/UL (ref 149–390)
PMV BLD AUTO: 11.8 FL (ref 8.9–12.7)
POTASSIUM SERPL-SCNC: 4.6 MMOL/L (ref 3.5–5.3)
PR INTERVAL: 146 MS
PROT SERPL-MCNC: 8.8 G/DL (ref 6.4–8.2)
PROTHROMBIN TIME: 13.8 SECONDS (ref 11.6–14.5)
QRS AXIS: 32 DEGREES
QRSD INTERVAL: 74 MS
QT INTERVAL: 392 MS
QTC INTERVAL: 446 MS
RBC # BLD AUTO: 4.81 MILLION/UL (ref 3.88–5.62)
SODIUM SERPL-SCNC: 130 MMOL/L (ref 136–145)
T WAVE AXIS: 57 DEGREES
TROPONIN I SERPL-MCNC: <0.02 NG/ML
TROPONIN I SERPL-MCNC: <0.02 NG/ML
VENTRICULAR RATE: 78 BPM
WBC # BLD AUTO: 7.15 THOUSAND/UL (ref 4.31–10.16)

## 2020-05-21 PROCEDURE — 90750 HZV VACC RECOMBINANT IM: CPT

## 2020-05-21 PROCEDURE — 84484 ASSAY OF TROPONIN QUANT: CPT

## 2020-05-21 PROCEDURE — 99284 EMERGENCY DEPT VISIT MOD MDM: CPT | Performed by: EMERGENCY MEDICINE

## 2020-05-21 PROCEDURE — 93010 ELECTROCARDIOGRAM REPORT: CPT | Performed by: INTERNAL MEDICINE

## 2020-05-21 PROCEDURE — 80053 COMPREHEN METABOLIC PANEL: CPT

## 2020-05-21 PROCEDURE — 90471 IMMUNIZATION ADMIN: CPT

## 2020-05-21 PROCEDURE — 84484 ASSAY OF TROPONIN QUANT: CPT | Performed by: EMERGENCY MEDICINE

## 2020-05-21 PROCEDURE — 99285 EMERGENCY DEPT VISIT HI MDM: CPT

## 2020-05-21 PROCEDURE — 93005 ELECTROCARDIOGRAM TRACING: CPT

## 2020-05-21 PROCEDURE — 83690 ASSAY OF LIPASE: CPT | Performed by: EMERGENCY MEDICINE

## 2020-05-21 PROCEDURE — 71046 X-RAY EXAM CHEST 2 VIEWS: CPT

## 2020-05-21 PROCEDURE — 96361 HYDRATE IV INFUSION ADD-ON: CPT

## 2020-05-21 PROCEDURE — 83880 ASSAY OF NATRIURETIC PEPTIDE: CPT | Performed by: EMERGENCY MEDICINE

## 2020-05-21 PROCEDURE — 85610 PROTHROMBIN TIME: CPT

## 2020-05-21 PROCEDURE — 96360 HYDRATION IV INFUSION INIT: CPT

## 2020-05-21 PROCEDURE — 36415 COLL VENOUS BLD VENIPUNCTURE: CPT | Performed by: EMERGENCY MEDICINE

## 2020-05-21 PROCEDURE — 85025 COMPLETE CBC W/AUTO DIFF WBC: CPT

## 2020-05-21 PROCEDURE — 85730 THROMBOPLASTIN TIME PARTIAL: CPT

## 2020-05-21 RX ORDER — SODIUM CHLORIDE 9 MG/ML
3 INJECTION INTRAVENOUS
Status: DISCONTINUED | OUTPATIENT
Start: 2020-05-21 | End: 2020-05-21

## 2020-05-21 RX ADMIN — SODIUM CHLORIDE 1000 ML: 0.9 INJECTION, SOLUTION INTRAVENOUS at 14:06

## 2020-05-26 ENCOUNTER — VBI (OUTPATIENT)
Dept: ADMINISTRATIVE | Facility: OTHER | Age: 60
End: 2020-05-26

## 2020-05-28 ENCOUNTER — TELEMEDICINE (OUTPATIENT)
Dept: FAMILY MEDICINE CLINIC | Facility: CLINIC | Age: 60
End: 2020-05-28
Payer: COMMERCIAL

## 2020-05-28 DIAGNOSIS — Z79.4 TYPE 2 DIABETES MELLITUS WITH DIABETIC POLYNEUROPATHY, WITH LONG-TERM CURRENT USE OF INSULIN (HCC): ICD-10-CM

## 2020-05-28 DIAGNOSIS — Z79.4 TYPE 2 DIABETES MELLITUS WITH DIABETIC POLYNEUROPATHY, WITH LONG-TERM CURRENT USE OF INSULIN (HCC): Primary | ICD-10-CM

## 2020-05-28 DIAGNOSIS — E11.42 TYPE 2 DIABETES MELLITUS WITH DIABETIC POLYNEUROPATHY, WITH LONG-TERM CURRENT USE OF INSULIN (HCC): ICD-10-CM

## 2020-05-28 DIAGNOSIS — R11.15 NON-INTRACTABLE CYCLICAL VOMITING WITH NAUSEA: ICD-10-CM

## 2020-05-28 DIAGNOSIS — E11.42 TYPE 2 DIABETES MELLITUS WITH DIABETIC POLYNEUROPATHY, WITH LONG-TERM CURRENT USE OF INSULIN (HCC): Primary | ICD-10-CM

## 2020-05-28 DIAGNOSIS — I10 ESSENTIAL HYPERTENSION: ICD-10-CM

## 2020-05-28 PROCEDURE — 3077F SYST BP >= 140 MM HG: CPT | Performed by: NURSE PRACTITIONER

## 2020-05-28 PROCEDURE — 1036F TOBACCO NON-USER: CPT | Performed by: NURSE PRACTITIONER

## 2020-05-28 PROCEDURE — 99213 OFFICE O/P EST LOW 20 MIN: CPT | Performed by: NURSE PRACTITIONER

## 2020-05-28 PROCEDURE — 3079F DIAST BP 80-89 MM HG: CPT | Performed by: NURSE PRACTITIONER

## 2020-05-28 PROCEDURE — 3060F POS MICROALBUMINURIA REV: CPT | Performed by: NURSE PRACTITIONER

## 2020-05-28 PROCEDURE — 3066F NEPHROPATHY DOC TX: CPT | Performed by: NURSE PRACTITIONER

## 2020-05-28 PROCEDURE — 3046F HEMOGLOBIN A1C LEVEL >9.0%: CPT | Performed by: NURSE PRACTITIONER

## 2020-05-28 RX ORDER — METFORMIN HYDROCHLORIDE 500 MG/1
500 TABLET, EXTENDED RELEASE ORAL 2 TIMES DAILY WITH MEALS
Qty: 180 TABLET | Refills: 1 | Status: CANCELLED | OUTPATIENT
Start: 2020-05-28

## 2020-05-28 RX ORDER — PROMETHAZINE HYDROCHLORIDE 25 MG/1
25 TABLET ORAL 2 TIMES DAILY
Qty: 60 TABLET | Refills: 0 | Status: SHIPPED | OUTPATIENT
Start: 2020-05-28 | End: 2020-07-18 | Stop reason: SDUPTHER

## 2020-06-02 DIAGNOSIS — E11.42 DIABETIC POLYNEUROPATHY ASSOCIATED WITH TYPE 2 DIABETES MELLITUS (HCC): ICD-10-CM

## 2020-06-02 RX ORDER — GABAPENTIN 100 MG/1
CAPSULE ORAL
Qty: 90 CAPSULE | Refills: 0 | Status: SHIPPED | OUTPATIENT
Start: 2020-06-02 | End: 2020-06-15 | Stop reason: SDUPTHER

## 2020-06-04 ENCOUNTER — TELEPHONE (OUTPATIENT)
Dept: ENDOCRINOLOGY | Facility: CLINIC | Age: 60
End: 2020-06-04

## 2020-06-04 DIAGNOSIS — E55.9 VITAMIN D DEFICIENCY: ICD-10-CM

## 2020-06-05 DIAGNOSIS — E78.5 HYPERLIPIDEMIA ASSOCIATED WITH TYPE 2 DIABETES MELLITUS (HCC): ICD-10-CM

## 2020-06-05 DIAGNOSIS — I10 ESSENTIAL HYPERTENSION: ICD-10-CM

## 2020-06-05 DIAGNOSIS — R11.2 NAUSEA AND VOMITING, INTRACTABILITY OF VOMITING NOT SPECIFIED, UNSPECIFIED VOMITING TYPE: ICD-10-CM

## 2020-06-05 DIAGNOSIS — E11.42 TYPE 2 DIABETES MELLITUS WITH DIABETIC POLYNEUROPATHY, UNSPECIFIED WHETHER LONG TERM INSULIN USE (HCC): ICD-10-CM

## 2020-06-05 DIAGNOSIS — E11.69 HYPERLIPIDEMIA ASSOCIATED WITH TYPE 2 DIABETES MELLITUS (HCC): ICD-10-CM

## 2020-06-05 RX ORDER — LISINOPRIL AND HYDROCHLOROTHIAZIDE 25; 20 MG/1; MG/1
1 TABLET ORAL DAILY
Qty: 90 TABLET | Refills: 3 | Status: SHIPPED | OUTPATIENT
Start: 2020-06-05 | End: 2021-02-25 | Stop reason: HOSPADM

## 2020-06-05 RX ORDER — ISOSORBIDE MONONITRATE 120 MG/1
120 TABLET, EXTENDED RELEASE ORAL DAILY
Qty: 90 TABLET | Refills: 3 | Status: SHIPPED | OUTPATIENT
Start: 2020-06-05 | End: 2021-02-25 | Stop reason: HOSPADM

## 2020-06-05 RX ORDER — OMEPRAZOLE 20 MG/1
20 CAPSULE, DELAYED RELEASE ORAL DAILY
Qty: 90 CAPSULE | Refills: 3 | Status: SHIPPED | OUTPATIENT
Start: 2020-06-05 | End: 2021-02-25 | Stop reason: HOSPADM

## 2020-06-05 RX ORDER — PRAVASTATIN SODIUM 40 MG
40 TABLET ORAL DAILY
Qty: 90 TABLET | Refills: 3 | Status: SHIPPED | OUTPATIENT
Start: 2020-06-05 | End: 2020-06-22 | Stop reason: SDUPTHER

## 2020-06-09 DIAGNOSIS — Z79.4 TYPE 2 DIABETES MELLITUS WITH DIABETIC POLYNEUROPATHY, WITH LONG-TERM CURRENT USE OF INSULIN (HCC): ICD-10-CM

## 2020-06-09 DIAGNOSIS — E11.42 TYPE 2 DIABETES MELLITUS WITH DIABETIC POLYNEUROPATHY, UNSPECIFIED WHETHER LONG TERM INSULIN USE (HCC): ICD-10-CM

## 2020-06-09 DIAGNOSIS — E11.42 TYPE 2 DIABETES MELLITUS WITH DIABETIC POLYNEUROPATHY, WITH LONG-TERM CURRENT USE OF INSULIN (HCC): ICD-10-CM

## 2020-06-09 DIAGNOSIS — E78.5 HYPERLIPIDEMIA ASSOCIATED WITH TYPE 2 DIABETES MELLITUS (HCC): ICD-10-CM

## 2020-06-09 DIAGNOSIS — E11.69 HYPERLIPIDEMIA ASSOCIATED WITH TYPE 2 DIABETES MELLITUS (HCC): ICD-10-CM

## 2020-06-09 DIAGNOSIS — E55.9 VITAMIN D DEFICIENCY: ICD-10-CM

## 2020-06-09 RX ORDER — ERGOCALCIFEROL 1.25 MG/1
50000 CAPSULE ORAL WEEKLY
Qty: 90 CAPSULE | Refills: 0 | OUTPATIENT
Start: 2020-06-09

## 2020-06-11 ENCOUNTER — TELEMEDICINE (OUTPATIENT)
Dept: FAMILY MEDICINE CLINIC | Facility: CLINIC | Age: 60
End: 2020-06-11
Payer: COMMERCIAL

## 2020-06-11 DIAGNOSIS — I10 ESSENTIAL HYPERTENSION: ICD-10-CM

## 2020-06-11 DIAGNOSIS — E11.9 ENCOUNTER FOR DIABETIC FOOT EXAM (HCC): Primary | ICD-10-CM

## 2020-06-11 DIAGNOSIS — R07.9 CHEST PAIN, UNSPECIFIED TYPE: ICD-10-CM

## 2020-06-11 DIAGNOSIS — E87.1 HYPONATREMIA: ICD-10-CM

## 2020-06-11 DIAGNOSIS — Z13.5 SCREENING FOR DIABETIC RETINOPATHY: ICD-10-CM

## 2020-06-11 DIAGNOSIS — E11.42 TYPE 2 DIABETES MELLITUS WITH DIABETIC POLYNEUROPATHY, WITH LONG-TERM CURRENT USE OF INSULIN (HCC): ICD-10-CM

## 2020-06-11 DIAGNOSIS — R11.15 NON-INTRACTABLE CYCLICAL VOMITING WITH NAUSEA: ICD-10-CM

## 2020-06-11 DIAGNOSIS — Z79.4 TYPE 2 DIABETES MELLITUS WITH DIABETIC POLYNEUROPATHY, WITH LONG-TERM CURRENT USE OF INSULIN (HCC): ICD-10-CM

## 2020-06-11 PROCEDURE — 3046F HEMOGLOBIN A1C LEVEL >9.0%: CPT | Performed by: NURSE PRACTITIONER

## 2020-06-11 PROCEDURE — 3060F POS MICROALBUMINURIA REV: CPT | Performed by: NURSE PRACTITIONER

## 2020-06-11 PROCEDURE — 3077F SYST BP >= 140 MM HG: CPT | Performed by: NURSE PRACTITIONER

## 2020-06-11 PROCEDURE — 3079F DIAST BP 80-89 MM HG: CPT | Performed by: NURSE PRACTITIONER

## 2020-06-11 PROCEDURE — 3066F NEPHROPATHY DOC TX: CPT | Performed by: NURSE PRACTITIONER

## 2020-06-11 PROCEDURE — 1036F TOBACCO NON-USER: CPT | Performed by: NURSE PRACTITIONER

## 2020-06-11 PROCEDURE — 99214 OFFICE O/P EST MOD 30 MIN: CPT | Performed by: NURSE PRACTITIONER

## 2020-06-12 ENCOUNTER — CONSULT (OUTPATIENT)
Dept: CARDIOLOGY CLINIC | Facility: CLINIC | Age: 60
End: 2020-06-12
Payer: COMMERCIAL

## 2020-06-12 VITALS
BODY MASS INDEX: 29.25 KG/M2 | SYSTOLIC BLOOD PRESSURE: 140 MMHG | WEIGHT: 193 LBS | DIASTOLIC BLOOD PRESSURE: 80 MMHG | HEIGHT: 68 IN | HEART RATE: 80 BPM

## 2020-06-12 DIAGNOSIS — E78.5 HYPERLIPIDEMIA: ICD-10-CM

## 2020-06-12 DIAGNOSIS — E66.3 OVERWEIGHT: ICD-10-CM

## 2020-06-12 DIAGNOSIS — R07.9 CHEST PAIN, UNSPECIFIED TYPE: ICD-10-CM

## 2020-06-12 DIAGNOSIS — I10 HYPERTENSION: ICD-10-CM

## 2020-06-12 DIAGNOSIS — E11.42 TYPE 2 DIABETES MELLITUS WITH DIABETIC POLYNEUROPATHY, WITH LONG-TERM CURRENT USE OF INSULIN (HCC): ICD-10-CM

## 2020-06-12 DIAGNOSIS — Z79.4 TYPE 2 DIABETES MELLITUS WITH DIABETIC POLYNEUROPATHY, WITH LONG-TERM CURRENT USE OF INSULIN (HCC): ICD-10-CM

## 2020-06-12 DIAGNOSIS — R11.15 NON-INTRACTABLE CYCLICAL VOMITING WITH NAUSEA: ICD-10-CM

## 2020-06-12 DIAGNOSIS — R06.02 SHORTNESS OF BREATH: Primary | ICD-10-CM

## 2020-06-12 PROCEDURE — 3066F NEPHROPATHY DOC TX: CPT | Performed by: INTERNAL MEDICINE

## 2020-06-12 PROCEDURE — 3079F DIAST BP 80-89 MM HG: CPT | Performed by: INTERNAL MEDICINE

## 2020-06-12 PROCEDURE — 99204 OFFICE O/P NEW MOD 45 MIN: CPT | Performed by: INTERNAL MEDICINE

## 2020-06-12 PROCEDURE — 3046F HEMOGLOBIN A1C LEVEL >9.0%: CPT | Performed by: INTERNAL MEDICINE

## 2020-06-12 PROCEDURE — 3060F POS MICROALBUMINURIA REV: CPT | Performed by: INTERNAL MEDICINE

## 2020-06-12 PROCEDURE — 1036F TOBACCO NON-USER: CPT | Performed by: INTERNAL MEDICINE

## 2020-06-12 PROCEDURE — 3077F SYST BP >= 140 MM HG: CPT | Performed by: INTERNAL MEDICINE

## 2020-06-13 ENCOUNTER — TELEPHONE (OUTPATIENT)
Dept: OTHER | Facility: OTHER | Age: 60
End: 2020-06-13

## 2020-06-15 ENCOUNTER — TELEPHONE (OUTPATIENT)
Dept: ENDOCRINOLOGY | Facility: CLINIC | Age: 60
End: 2020-06-15

## 2020-06-15 DIAGNOSIS — E11.42 TYPE 2 DIABETES MELLITUS WITH DIABETIC POLYNEUROPATHY, UNSPECIFIED WHETHER LONG TERM INSULIN USE (HCC): ICD-10-CM

## 2020-06-15 DIAGNOSIS — E11.42 DIABETIC POLYNEUROPATHY ASSOCIATED WITH TYPE 2 DIABETES MELLITUS (HCC): ICD-10-CM

## 2020-06-15 DIAGNOSIS — E55.9 VITAMIN D DEFICIENCY: ICD-10-CM

## 2020-06-15 DIAGNOSIS — Z79.4 TYPE 2 DIABETES MELLITUS WITH DIABETIC POLYNEUROPATHY, WITH LONG-TERM CURRENT USE OF INSULIN (HCC): ICD-10-CM

## 2020-06-15 DIAGNOSIS — E11.42 TYPE 2 DIABETES MELLITUS WITH DIABETIC POLYNEUROPATHY, WITH LONG-TERM CURRENT USE OF INSULIN (HCC): ICD-10-CM

## 2020-06-15 RX ORDER — ERGOCALCIFEROL 1.25 MG/1
50000 CAPSULE ORAL WEEKLY
Qty: 12 CAPSULE | Refills: 0 | Status: CANCELLED | OUTPATIENT
Start: 2020-06-15

## 2020-06-15 RX ORDER — GABAPENTIN 100 MG/1
100 CAPSULE ORAL 3 TIMES DAILY
Qty: 90 CAPSULE | Refills: 3 | Status: SHIPPED | OUTPATIENT
Start: 2020-06-15 | End: 2020-06-19 | Stop reason: SDUPTHER

## 2020-06-17 ENCOUNTER — TELEMEDICINE (OUTPATIENT)
Dept: ENDOCRINOLOGY | Facility: CLINIC | Age: 60
End: 2020-06-17
Payer: COMMERCIAL

## 2020-06-17 DIAGNOSIS — E78.2 MIXED HYPERLIPIDEMIA: ICD-10-CM

## 2020-06-17 DIAGNOSIS — Z79.4 TYPE 2 DIABETES MELLITUS WITH DIABETIC POLYNEUROPATHY, WITH LONG-TERM CURRENT USE OF INSULIN (HCC): Primary | ICD-10-CM

## 2020-06-17 DIAGNOSIS — E11.42 TYPE 2 DIABETES MELLITUS WITH DIABETIC POLYNEUROPATHY, WITH LONG-TERM CURRENT USE OF INSULIN (HCC): Primary | ICD-10-CM

## 2020-06-17 DIAGNOSIS — I10 ESSENTIAL HYPERTENSION: ICD-10-CM

## 2020-06-17 DIAGNOSIS — R11.2 NAUSEA AND VOMITING, INTRACTABILITY OF VOMITING NOT SPECIFIED, UNSPECIFIED VOMITING TYPE: ICD-10-CM

## 2020-06-17 PROCEDURE — 99443 PR PHYS/QHP TELEPHONE EVALUATION 21-30 MIN: CPT | Performed by: PHYSICIAN ASSISTANT

## 2020-06-18 ENCOUNTER — TELEPHONE (OUTPATIENT)
Dept: ENDOCRINOLOGY | Facility: CLINIC | Age: 60
End: 2020-06-18

## 2020-06-18 ENCOUNTER — OFFICE VISIT (OUTPATIENT)
Dept: FAMILY MEDICINE CLINIC | Facility: CLINIC | Age: 60
End: 2020-06-18
Payer: COMMERCIAL

## 2020-06-18 VITALS
DIASTOLIC BLOOD PRESSURE: 74 MMHG | OXYGEN SATURATION: 98 % | WEIGHT: 185.4 LBS | TEMPERATURE: 98.4 F | HEIGHT: 68 IN | SYSTOLIC BLOOD PRESSURE: 126 MMHG | HEART RATE: 112 BPM | BODY MASS INDEX: 28.1 KG/M2

## 2020-06-18 DIAGNOSIS — E11.42 TYPE 2 DIABETES MELLITUS WITH DIABETIC POLYNEUROPATHY, WITH LONG-TERM CURRENT USE OF INSULIN (HCC): Primary | ICD-10-CM

## 2020-06-18 DIAGNOSIS — R60.0 BILATERAL LOWER EXTREMITY EDEMA: ICD-10-CM

## 2020-06-18 DIAGNOSIS — I10 ESSENTIAL HYPERTENSION: ICD-10-CM

## 2020-06-18 DIAGNOSIS — R06.02 SOB (SHORTNESS OF BREATH) ON EXERTION: ICD-10-CM

## 2020-06-18 DIAGNOSIS — Z79.4 TYPE 2 DIABETES MELLITUS WITH DIABETIC POLYNEUROPATHY, WITH LONG-TERM CURRENT USE OF INSULIN (HCC): Primary | ICD-10-CM

## 2020-06-18 DIAGNOSIS — R11.15 NON-INTRACTABLE CYCLICAL VOMITING WITH NAUSEA: ICD-10-CM

## 2020-06-18 LAB — SL AMB POCT HEMOGLOBIN AIC: 12.9 (ref ?–6.5)

## 2020-06-18 PROCEDURE — 3078F DIAST BP <80 MM HG: CPT | Performed by: NURSE PRACTITIONER

## 2020-06-18 PROCEDURE — 3060F POS MICROALBUMINURIA REV: CPT | Performed by: NURSE PRACTITIONER

## 2020-06-18 PROCEDURE — 1036F TOBACCO NON-USER: CPT | Performed by: NURSE PRACTITIONER

## 2020-06-18 PROCEDURE — 83036 HEMOGLOBIN GLYCOSYLATED A1C: CPT | Performed by: NURSE PRACTITIONER

## 2020-06-18 PROCEDURE — 3046F HEMOGLOBIN A1C LEVEL >9.0%: CPT | Performed by: NURSE PRACTITIONER

## 2020-06-18 PROCEDURE — 3074F SYST BP LT 130 MM HG: CPT | Performed by: NURSE PRACTITIONER

## 2020-06-18 PROCEDURE — 3008F BODY MASS INDEX DOCD: CPT | Performed by: NURSE PRACTITIONER

## 2020-06-18 PROCEDURE — 99214 OFFICE O/P EST MOD 30 MIN: CPT | Performed by: NURSE PRACTITIONER

## 2020-06-18 PROCEDURE — 3066F NEPHROPATHY DOC TX: CPT | Performed by: NURSE PRACTITIONER

## 2020-06-19 ENCOUNTER — HOSPITAL ENCOUNTER (OUTPATIENT)
Dept: NUCLEAR MEDICINE | Facility: HOSPITAL | Age: 60
Discharge: HOME | End: 2020-06-19
Attending: INTERNAL MEDICINE
Payer: COMMERCIAL

## 2020-06-19 ENCOUNTER — HOSPITAL ENCOUNTER (OUTPATIENT)
Dept: NON INVASIVE DIAGNOSTICS | Facility: HOSPITAL | Age: 60
Discharge: HOME | End: 2020-06-19
Attending: INTERNAL MEDICINE
Payer: COMMERCIAL

## 2020-06-19 DIAGNOSIS — E11.42 TYPE 2 DIABETES MELLITUS WITH DIABETIC POLYNEUROPATHY, WITH LONG-TERM CURRENT USE OF INSULIN (HCC): ICD-10-CM

## 2020-06-19 DIAGNOSIS — R11.15 NON-INTRACTABLE CYCLICAL VOMITING WITH NAUSEA: ICD-10-CM

## 2020-06-19 DIAGNOSIS — R06.02 SHORTNESS OF BREATH: ICD-10-CM

## 2020-06-19 DIAGNOSIS — Z79.4 TYPE 2 DIABETES MELLITUS WITH DIABETIC POLYNEUROPATHY, WITH LONG-TERM CURRENT USE OF INSULIN (HCC): ICD-10-CM

## 2020-06-19 DIAGNOSIS — E11.42 DIABETIC POLYNEUROPATHY ASSOCIATED WITH TYPE 2 DIABETES MELLITUS (HCC): ICD-10-CM

## 2020-06-19 DIAGNOSIS — R07.9 CHEST PAIN, UNSPECIFIED TYPE: ICD-10-CM

## 2020-06-19 PROCEDURE — A9502 TC99M TETROFOSMIN: HCPCS

## 2020-06-19 PROCEDURE — 78452 HT MUSCLE IMAGE SPECT MULT: CPT | Performed by: INTERNAL MEDICINE

## 2020-06-19 PROCEDURE — 93018 CV STRESS TEST I&R ONLY: CPT | Performed by: INTERNAL MEDICINE

## 2020-06-19 PROCEDURE — 78452 HT MUSCLE IMAGE SPECT MULT: CPT

## 2020-06-19 PROCEDURE — 93016 CV STRESS TEST SUPVJ ONLY: CPT | Performed by: INTERNAL MEDICINE

## 2020-06-19 PROCEDURE — 93017 CV STRESS TEST TRACING ONLY: CPT

## 2020-06-19 RX ADMIN — REGADENOSON 0.4 MG: 0.08 INJECTION, SOLUTION INTRAVENOUS at 10:40

## 2020-06-22 DIAGNOSIS — E78.5 HYPERLIPIDEMIA ASSOCIATED WITH TYPE 2 DIABETES MELLITUS (HCC): ICD-10-CM

## 2020-06-22 DIAGNOSIS — E11.69 HYPERLIPIDEMIA ASSOCIATED WITH TYPE 2 DIABETES MELLITUS (HCC): ICD-10-CM

## 2020-06-22 RX ORDER — PRAVASTATIN SODIUM 40 MG
40 TABLET ORAL DAILY
Qty: 90 TABLET | Refills: 3 | Status: SHIPPED | OUTPATIENT
Start: 2020-06-22 | End: 2021-02-25 | Stop reason: HOSPADM

## 2020-06-22 RX ORDER — GABAPENTIN 100 MG/1
100 CAPSULE ORAL 3 TIMES DAILY
Qty: 270 CAPSULE | Refills: 2 | Status: SHIPPED | OUTPATIENT
Start: 2020-06-22 | End: 2021-02-25 | Stop reason: HOSPADM

## 2020-06-23 ENCOUNTER — HOSPITAL ENCOUNTER (OUTPATIENT)
Dept: NON INVASIVE DIAGNOSTICS | Facility: HOSPITAL | Age: 60
Discharge: HOME | End: 2020-06-23
Attending: INTERNAL MEDICINE
Payer: COMMERCIAL

## 2020-06-23 DIAGNOSIS — R11.15 NON-INTRACTABLE CYCLICAL VOMITING WITH NAUSEA: ICD-10-CM

## 2020-06-23 DIAGNOSIS — R06.02 SHORTNESS OF BREATH: ICD-10-CM

## 2020-06-23 DIAGNOSIS — Z79.4 TYPE 2 DIABETES MELLITUS WITH DIABETIC POLYNEUROPATHY, WITH LONG-TERM CURRENT USE OF INSULIN (HCC): ICD-10-CM

## 2020-06-23 DIAGNOSIS — E11.42 TYPE 2 DIABETES MELLITUS WITH DIABETIC POLYNEUROPATHY, WITH LONG-TERM CURRENT USE OF INSULIN (HCC): ICD-10-CM

## 2020-06-23 DIAGNOSIS — R07.9 CHEST PAIN, UNSPECIFIED TYPE: ICD-10-CM

## 2020-06-23 LAB
ARRHY DURING EX: NORMAL
CHEST PAIN STATEMENT: NORMAL
MAX DIASTOLIC BP: 64 MMHG
MAX HEART RATE: 102 BPM
MAX PREDICTED HEART RATE: 158 BPM
MAX. SYSTOLIC BP: 114 MMHG
PROTOCOL NAME: NORMAL
REASON FOR TERMINATION: NORMAL
TARGET HR FORMULA: NORMAL
TEST INDICATION: NORMAL
TIME IN EXERCISE PHASE: NORMAL

## 2020-06-23 PROCEDURE — 93306 TTE W/DOPPLER COMPLETE: CPT | Performed by: INTERNAL MEDICINE

## 2020-06-23 PROCEDURE — 93306 TTE W/DOPPLER COMPLETE: CPT

## 2020-06-29 ENCOUNTER — TELEPHONE (OUTPATIENT)
Dept: ENDOCRINOLOGY | Facility: CLINIC | Age: 60
End: 2020-06-29

## 2020-07-06 DIAGNOSIS — Z79.4 TYPE 2 DIABETES MELLITUS WITH DIABETIC POLYNEUROPATHY, WITH LONG-TERM CURRENT USE OF INSULIN (HCC): ICD-10-CM

## 2020-07-06 DIAGNOSIS — E11.42 TYPE 2 DIABETES MELLITUS WITH DIABETIC POLYNEUROPATHY, WITH LONG-TERM CURRENT USE OF INSULIN (HCC): ICD-10-CM

## 2020-07-09 DIAGNOSIS — E11.42 TYPE 2 DIABETES MELLITUS WITH DIABETIC POLYNEUROPATHY, WITH LONG-TERM CURRENT USE OF INSULIN (HCC): ICD-10-CM

## 2020-07-09 DIAGNOSIS — E55.9 VITAMIN D DEFICIENCY: ICD-10-CM

## 2020-07-09 DIAGNOSIS — Z79.4 TYPE 2 DIABETES MELLITUS WITH DIABETIC POLYNEUROPATHY, WITH LONG-TERM CURRENT USE OF INSULIN (HCC): ICD-10-CM

## 2020-07-09 RX ORDER — ERGOCALCIFEROL 1.25 MG/1
50000 CAPSULE ORAL WEEKLY
Qty: 8 CAPSULE | Refills: 3 | Status: SHIPPED | OUTPATIENT
Start: 2020-07-09 | End: 2021-02-25 | Stop reason: HOSPADM

## 2020-07-13 DIAGNOSIS — Z79.4 TYPE 2 DIABETES MELLITUS WITH DIABETIC POLYNEUROPATHY, WITH LONG-TERM CURRENT USE OF INSULIN (HCC): ICD-10-CM

## 2020-07-13 DIAGNOSIS — E11.42 TYPE 2 DIABETES MELLITUS WITH DIABETIC POLYNEUROPATHY, WITH LONG-TERM CURRENT USE OF INSULIN (HCC): ICD-10-CM

## 2020-07-18 ENCOUNTER — OFFICE VISIT (OUTPATIENT)
Dept: FAMILY MEDICINE CLINIC | Facility: CLINIC | Age: 60
End: 2020-07-18
Payer: COMMERCIAL

## 2020-07-18 VITALS
TEMPERATURE: 98.4 F | BODY MASS INDEX: 28.55 KG/M2 | HEIGHT: 68 IN | WEIGHT: 188.4 LBS | SYSTOLIC BLOOD PRESSURE: 140 MMHG | DIASTOLIC BLOOD PRESSURE: 80 MMHG | HEART RATE: 117 BPM | OXYGEN SATURATION: 98 %

## 2020-07-18 DIAGNOSIS — Z79.4 TYPE 2 DIABETES MELLITUS WITH DIABETIC POLYNEUROPATHY, WITH LONG-TERM CURRENT USE OF INSULIN (HCC): Primary | ICD-10-CM

## 2020-07-18 DIAGNOSIS — R11.15 NON-INTRACTABLE CYCLICAL VOMITING WITH NAUSEA: ICD-10-CM

## 2020-07-18 DIAGNOSIS — Z13.31 POSITIVE DEPRESSION SCREENING: ICD-10-CM

## 2020-07-18 DIAGNOSIS — R11.0 NAUSEA: ICD-10-CM

## 2020-07-18 DIAGNOSIS — E11.42 TYPE 2 DIABETES MELLITUS WITH DIABETIC POLYNEUROPATHY, WITH LONG-TERM CURRENT USE OF INSULIN (HCC): Primary | ICD-10-CM

## 2020-07-18 LAB — SL AMB POCT HEMOGLOBIN AIC: 11.9 (ref ?–6.5)

## 2020-07-18 PROCEDURE — 99214 OFFICE O/P EST MOD 30 MIN: CPT | Performed by: NURSE PRACTITIONER

## 2020-07-18 PROCEDURE — 3008F BODY MASS INDEX DOCD: CPT | Performed by: NURSE PRACTITIONER

## 2020-07-18 PROCEDURE — 3066F NEPHROPATHY DOC TX: CPT | Performed by: NURSE PRACTITIONER

## 2020-07-18 PROCEDURE — 1036F TOBACCO NON-USER: CPT | Performed by: NURSE PRACTITIONER

## 2020-07-18 PROCEDURE — 3077F SYST BP >= 140 MM HG: CPT | Performed by: NURSE PRACTITIONER

## 2020-07-18 PROCEDURE — 3060F POS MICROALBUMINURIA REV: CPT | Performed by: NURSE PRACTITIONER

## 2020-07-18 PROCEDURE — 3079F DIAST BP 80-89 MM HG: CPT | Performed by: NURSE PRACTITIONER

## 2020-07-18 PROCEDURE — 83036 HEMOGLOBIN GLYCOSYLATED A1C: CPT | Performed by: NURSE PRACTITIONER

## 2020-07-18 PROCEDURE — 3046F HEMOGLOBIN A1C LEVEL >9.0%: CPT | Performed by: NURSE PRACTITIONER

## 2020-07-18 RX ORDER — PROMETHAZINE HYDROCHLORIDE 25 MG/1
25 TABLET ORAL 2 TIMES DAILY
Qty: 60 TABLET | Refills: 0 | Status: SHIPPED | OUTPATIENT
Start: 2020-07-18 | End: 2020-10-13

## 2020-07-18 NOTE — PROGRESS NOTES
Assessment/Plan:    No problem-specific Assessment & Plan notes found for this encounter  Diagnoses and all orders for this visit:    Type 2 diabetes mellitus with diabetic polyneuropathy, with long-term current use of insulin ([facility] [address] )  Comments:  Pt currently taking jardiane, Janumet, Ozempic and daily Humulin 70/30 40 IU bid POCT hgba1c 11 7  Orders:  -     POCT hemoglobin A1c    Non-intractable cyclical vomiting with nausea  Comments:  Phenergan 25mg ordered  Orders:  -     promethazine (PHENERGAN) 25 mg tablet; Take 1 tablet (25 mg total) by mouth 2 (two) times a day    Nausea  -     promethazine (PHENERGAN) 25 mg tablet; Take 1 tablet (25 mg total) by mouth 2 (two) times a day    Positive depression screening  Comments:  pt declines any medication          Subjective:      Patient ID: Wanda Phelan is a 56 y o  male  56 yr old male presents for DM f/u  I readjusted his medication to include Janumet , Jardiance, 70/30 insulin 40 IU bid and Ozempic once weekly  Pt has positive depression screen but declines any medication at this time and would like to try getting back to work  POCT 11 7 today improving from last month      The following portions of the patient's history were reviewed and updated as appropriate: He  has a past medical history of [facility] ), Hypercholesteremia, and Hypertension    He   Patient Active Problem List    Diagnosis Date Noted    SOB (shortness of breath) on exertion 06/18/2020    Nausea and vomiting 06/17/2020    Annual physical exam 03/12/2020    Vitamin D deficiency 12/05/2019    Mononeuropathy, unspecified 11/07/2019    Other and unspecified noninfectious gastroenteritis and colitis 11/07/2019    Mixed hyperlipidemia 11/07/2019    Proteinuria 11/07/2019    Tobacco abuse counseling 11/07/2019    BMI 28 0-28 9,adult 08/08/2019    Positive depression screening 08/08/2019    Bilateral lower extremity edema 11/27/2018    Callous ulcer, limited to breakdown of skin ([place] [id number] ) 10/30/2018    Non-intractable cyclical vomiting with nausea 10/04/2018    Fatigue due to exposure 04/14/2017    Essential hypertension 04/13/2017    Hyperlipidemia associated with type 2 diabetes mellitus ([place] [address] ) 04/13/2017    Methadone maintenance therapy patient ([place] [address] ) 04/13/2017    Type 2 diabetes mellitus with diabetic polyneuropathy ([place] [address] ) 04/13/2017    Chronic hepatitis C without hepatic coma ([place] [address] ) 02/27/2017     He  has no past surgical history on file  His family history includes Acute lymphoblastic leukemia in his father; Cancer in his sister; Diabetes in his mother; Hypertension in his father; Leukemia in his father  He  reports that he has quit smoking  He has never used smokeless tobacco  He reports that he does not drink alcohol or use drugs    Current Outpatient Medications   Medication Sig Dispense Refill    Blood Glucose Monitoring Suppl (Manuel Sow) w/Device KIT Use to test blood sugars 3 times daily 1 kit 0    Empagliflozin (Jardiance) 25 MG TABS Take 1 tablet (25 mg total) by mouth every morning 90 tablet 1    ergocalciferol (VITAMIN D2) 50,000 units Take 1 capsule (50,000 Units total) by mouth once a week 8 capsule 3    erythromycin (ILOTYCIN) ophthalmic ointment 0 5 inches daily at bedtime      furosemide (LASIX) 40 mg tablet Take 1 tablet (40 mg total) by mouth daily (Patient taking differently: Take 40 mg by mouth as needed (on off work days) ) 90 tablet 3    gabapentin (NEURONTIN) 100 mg capsule Take 1 capsule (100 mg total) by mouth 3 (three) times a day 270 capsule 2    glucose blood (ONETOUCH VERIO) test strip Use to test blood sugars 3 times daily 300 each 1    insulin NPH-insulin regular (NovoLIN 70/30) 100 units/mL subcutaneous injection Inject 40 Units under the skin 2 (two) times a day before meals 10 mL 3    Insulin Pen Needle 31G X 5 MM MISC by Does not apply route daily Pt to inject 4 X daily 100 each 5    Insulin Pen Needle 31G X 5 MM MISC 30 units sq 2X daily 100 each 3    isosorbide mononitrate (IMDUR) 120 mg 24 hr tablet Take 1 tablet (120 mg total) by mouth daily 90 tablet 3    lisinopril-hydrochlorothiazide (PRINZIDE,ZESTORETIC) 20-25 MG per tablet Take 1 tablet by mouth daily 90 tablet 3    methadone (DOLOPHINE) 10 MG/5ML solution Take 70 mg by mouth      omeprazole (PriLOSEC) 20 mg delayed release capsule Take 1 capsule (20 mg total) by mouth daily 90 capsule 3    potassium chloride (K-DUR,KLOR-CON) 20 mEq tablet Take 2 tablets (40 mEq total) by mouth daily 180 tablet 3    pravastatin (PRAVACHOL) 40 mg tablet Take 1 tablet (40 mg total) by mouth daily 90 tablet 3    promethazine (PHENERGAN) 25 mg tablet Take 1 tablet (25 mg total) by mouth 2 (two) times a day 60 tablet 0    Semaglutide,0 25 or 0 5MG/DOS, 2 MG/1 5ML SOPN Inject 0 5 mg under the skin once a week 4 pen 1    sitaGLIPtin-metFORMIN (JANUMET)  MG per tablet Take 1 tablet by mouth 2 (two) times a day with meals 180 tablet 2     No current facility-administered medications for this visit        Current Outpatient Medications on File Prior to Visit   Medication Sig    Blood Glucose Monitoring Suppl (ONETOUCH VERIO) w/Device KIT Use to test blood sugars 3 times daily    Empagliflozin (Jardiance) 25 MG TABS Take 1 tablet (25 mg total) by mouth every morning    ergocalciferol (VITAMIN D2) 50,000 units Take 1 capsule (50,000 Units total) by mouth once a week    erythromycin (ILOTYCIN) ophthalmic ointment 0 5 inches daily at bedtime    furosemide (LASIX) 40 mg tablet Take 1 tablet (40 mg total) by mouth daily (Patient taking differently: Take 40 mg by mouth as needed (on off work days) )    gabapentin (NEURONTIN) 100 mg capsule Take 1 capsule (100 mg total) by mouth 3 (three) times a day    glucose blood (ONETOUCH VERIO) test strip Use to test blood sugars 3 times daily    insulin NPH-insulin regular (NovoLIN 70/30) 100 units/mL subcutaneous injection Inject 40 Units under the skin 2 (two) times a day before meals    Insulin Pen Needle 31G X 5 MM MISC by Does not apply route daily Pt to inject 4 X daily    Insulin Pen Needle 31G X 5 MM MISC 30 units sq 2X daily    isosorbide mononitrate (IMDUR) 120 mg 24 hr tablet Take 1 tablet (120 mg total) by mouth daily    lisinopril-hydrochlorothiazide (PRINZIDE,ZESTORETIC) 20-25 MG per tablet Take 1 tablet by mouth daily    methadone (DOLOPHINE) 10 MG/5ML solution Take 70 mg by mouth    omeprazole (PriLOSEC) 20 mg delayed release capsule Take 1 capsule (20 mg total) by mouth daily    potassium chloride (K-DUR,KLOR-CON) 20 mEq tablet Take 2 tablets (40 mEq total) by mouth daily    pravastatin (PRAVACHOL) 40 mg tablet Take 1 tablet (40 mg total) by mouth daily    Semaglutide,0 25 or 0 5MG/DOS, 2 MG/1 5ML SOPN Inject 0 5 mg under the skin once a week    sitaGLIPtin-metFORMIN (JANUMET)  MG per tablet Take 1 tablet by mouth 2 (two) times a day with meals    [DISCONTINUED] promethazine (PHENERGAN) 25 mg tablet Take 1 tablet (25 mg total) by mouth 2 (two) times a day     No current facility-administered medications on file prior to visit  He is allergic to varenicline       Review of Systems   Constitutional: Negative  HENT: Negative  Eyes: Negative  Respiratory: Negative  Cardiovascular: Negative  Gastrointestinal: Positive for nausea  Endocrine: Negative  Genitourinary: Negative  Musculoskeletal: Negative  Skin: Negative  Allergic/Immunologic: Negative  Neurological: Negative  Hematological: Negative  Psychiatric/Behavioral: Negative  Depressed          BMI Counseling: Body mass index is 28 65 kg/m²  Discussed the patient's BMI with him   The BMI is above normal  Nutrition recommendations include reducing portion sizes, decreasing overall calorie intake, 3-5 servings of fruits/vegetables daily, reducing fast food intake, consuming healthier snacks, decreasing soda and/or juice intake, moderation in carbohydrate intake, increasing intake of lean protein, reducing intake of saturated fat and trans fat and reducing intake of cholesterol  Exercise recommendations include exercising 3-5 times per week  PHQ-9 Depression Screening    PHQ-9:    Frequency of the following problems over the past two weeks:       Little interest or pleasure in doing things:  1 - several days  Feeling down, depressed, or hopeless:  1 - several days  PHQ-2 Score:  2        Depression Screening Follow-up Plan: Patient's depression screening was positive with a PHQ-2 score of 2  Their PHQ-9 score was   Clinically patient does not have depression  No treatment is required  Objective:      /80   Pulse (!) 117   Temp 98 4 °F (36 9 °C)   Ht 5' 8" (1 727 m)   Wt 85 5 kg (188 lb 6 4 oz)   SpO2 98%   BMI 28 65 kg/m²          Physical Exam   Constitutional: He is oriented to person, place, and time  He appears well-developed and well-nourished  HENT:   Head: Normocephalic  Eyes: Pupils are equal, round, and reactive to light  Conjunctivae and EOM are normal    Neck: Normal range of motion  Neck supple  Cardiovascular: Normal rate and regular rhythm  Pulmonary/Chest: Effort normal and breath sounds normal    Abdominal: Soft  Bowel sounds are normal    Musculoskeletal: Normal range of motion  Neurological: He is alert and oriented to person, place, and time  Skin: Skin is warm and dry  Psychiatric: He has a normal mood and affect   His behavior is normal  Judgment and thought content normal

## 2020-07-18 NOTE — LETTER
July 18, 2020     Patient: Benjamin Rayo   YOB: 1960   Date of Visit: 7/18/2020       To Whom it May Concern:    Benjamin Rayo is under my professional care  He was seen in my office on 7/18/2020  He may return to work on July 21, 2020 with no restrictions       If you have any questions or concerns, please don't hesitate to call           Sincerely,          VERONICA Cristina        CC: No Recipients

## 2020-08-11 DIAGNOSIS — E11.42 TYPE 2 DIABETES MELLITUS WITH DIABETIC POLYNEUROPATHY, WITH LONG-TERM CURRENT USE OF INSULIN (HCC): ICD-10-CM

## 2020-08-11 DIAGNOSIS — Z79.4 TYPE 2 DIABETES MELLITUS WITH DIABETIC POLYNEUROPATHY, WITH LONG-TERM CURRENT USE OF INSULIN (HCC): ICD-10-CM

## 2020-08-11 RX ORDER — BLOOD SUGAR DIAGNOSTIC
STRIP MISCELLANEOUS
Qty: 300 EACH | Refills: 1 | Status: SHIPPED | OUTPATIENT
Start: 2020-08-11 | End: 2020-08-15 | Stop reason: SDUPTHER

## 2020-08-15 DIAGNOSIS — E11.42 TYPE 2 DIABETES MELLITUS WITH DIABETIC POLYNEUROPATHY, WITH LONG-TERM CURRENT USE OF INSULIN (HCC): ICD-10-CM

## 2020-08-15 DIAGNOSIS — Z79.4 TYPE 2 DIABETES MELLITUS WITH DIABETIC POLYNEUROPATHY, WITH LONG-TERM CURRENT USE OF INSULIN (HCC): ICD-10-CM

## 2020-08-15 RX ORDER — BLOOD SUGAR DIAGNOSTIC
STRIP MISCELLANEOUS
Qty: 300 EACH | Refills: 3 | Status: SHIPPED | OUTPATIENT
Start: 2020-08-15 | End: 2021-02-25 | Stop reason: HOSPADM

## 2020-08-17 ENCOUNTER — TELEPHONE (OUTPATIENT)
Dept: ENDOCRINOLOGY | Facility: CLINIC | Age: 60
End: 2020-08-17

## 2020-08-17 NOTE — TELEPHONE ENCOUNTER
Received request from Bladimir Mariscal and pt signed consent for ov notes from 5/1/2020 to present pt seen 6/17/2020, notes faxed

## 2020-10-02 DIAGNOSIS — R11.15 NON-INTRACTABLE CYCLICAL VOMITING WITH NAUSEA: ICD-10-CM

## 2020-10-02 DIAGNOSIS — R11.0 NAUSEA: ICD-10-CM

## 2020-10-13 RX ORDER — PROMETHAZINE HYDROCHLORIDE 25 MG/1
TABLET ORAL
Qty: 180 TABLET | Refills: 3 | Status: SHIPPED | OUTPATIENT
Start: 2020-10-13 | End: 2021-02-25 | Stop reason: HOSPADM

## 2020-10-27 NOTE — PROGRESS NOTES
Assessment/Plan:    No problem-specific Assessment & Plan notes found for this encounter  Diagnoses and all orders for this visit:    Essential hypertension  Comments:  I stopped the Lisinopril due to ongoing flank pain, despite US findings of kidney /bladder are negative  Start IMdur bp elevated today  Orders:  -     isosorbide mononitrate (IMDUR) 120 mg 24 hr tablet; Take 1 tablet (120 mg total) by mouth daily    Type 2 diabetes mellitus with diabetic polyneuropathy, without long-term current use of insulin ([facility])  Comments:   Pt was instructed to take Hum U500 at 1pm and 10pm and start taking his Lantus at 4am, FMLA paperwork completed today  Orders:  -     POCT blood glucose    Other orders  -     erythromycin (ILOTYCIN) ophthalmic ointment; 0 5 inches daily at bedtime          Subjective:      Patient ID: Marcos Francois is a 62 y o  male  Diabetes Mellitus  Patient presents for follow up of diabetes  Current symptoms include: paresthesia of the feet and vomitting  Symptoms have gradually worsened  Patient denies any other complaint except constipation  Pt reports he is eating prunes but notes ongoing constipation    Evaluation to date has included: fasting blood sugar, fasting lipid panel, hemoglobin A1C and microalbuminuria  Home sugars: pt reports 302 today but 89 last night with lower numbers noted in the pm  Pt works night shift and has much difficulty controlling his sugar due to his erratic meal and sleep schedule    I recently increased both his Humulin U500 and his Lantus to 50 IU each and pt has developed N/V since that time  After much discussion with Hezekiah Bamberger, I advised him to start taking his Lantus while at work at [employer] then his Hum U500 at 1pm and 10pm so that his inj are spaced out farther apart and we can reassess   I also advised pt to drink prune juice daily        The following portions of the patient's history were reviewed and updated as appropriate: He  has a past medical history of KONG, OCCUPATIONAL THERAPIST, FROM [facility] CALLED STATING THAT SHE FAXED UPDATE PLAN OF CARE PAPERWORK ON THE PATIENT, ANDREW CHA, TO DR. BHATT'S OFFICE ON 10/14/20, AND AGAIN TODAY (10/27/20). KONG STATED THAT THE PAPERWORK CONTAINS 2 PARTS: HIS QUARTERLY SUMMARY (OR AN UPDATE ON HIS GOALS) AND A SUPPLEMENTAL ORDER (OR A REQUEST TO CONTINUE HER CARE WITH HIM).    KONG STATED THAT SHE IS AWAITING A SIGNATURE FROM DR. RON BHATT ON BOTH THE QUARTERLY SUMMARY AS WELL AS THE SUPPLEMENTAL ORDER. KONG STATED THAT THE BEST FAX NUMBER FOR THIS PAPERWORK TO BE SENT BACK TO [phone].    PLEASE CALL KONG, OCCUPATIONAL THERAPIST, FROM [facility] [phone] WHEN THIS MESSAGE HAS BEEN RECEIVED AND ADVISE HER REGARDING THE STATUS OF THIS REQUEST.   Diabetes mellitus ([place] [address] )  He   Patient Active Problem List    Diagnosis Date Noted    Bilateral lower extremity edema 11/27/2018    Callous ulcer, limited to breakdown of skin ([place] [address] ) 10/30/2018    Non-intractable cyclical vomiting with nausea 10/04/2018    Fatigue due to exposure 04/14/2017    Essential hypertension 04/13/2017    Hyperlipidemia associated with type 2 diabetes mellitus ([place] [address] ) 04/13/2017    Methadone maintenance therapy patient ([place] [address] ) 04/13/2017    Type 2 diabetes mellitus with diabetic polyneuropathy ([place] [address] ) 04/13/2017    Chronic hepatitis C without hepatic coma ([place] [address] ) 02/27/2017     He  has no past surgical history on file  His family history includes Diabetes in his mother; Hypertension in his father  He  reports that he has quit smoking  He has never used smokeless tobacco  He reports that he does not drink alcohol or use drugs    Current Outpatient Medications   Medication Sig Dispense Refill    erythromycin (ILOTYCIN) ophthalmic ointment 0 5 inches daily at bedtime      furosemide (LASIX) 40 mg tablet Take 1 tablet (40 mg total) by mouth daily 90 tablet 3    insulin glargine (LANTUS) 100 units/mL subcutaneous injection Inject 20 Units under the skin daily Inject 50 units daily 10 mL 5    Insulin Pen Needle 31G X 5 MM MISC by Does not apply route daily Pt to inject 4 X daily 100 each 5    Insulin Pen Needle 31G X 5 MM MISC 30 units sq 2X daily 100 each 3    Insulin Regular Human, Conc, (HUMULIN R U-500 KWIKPEN) 500 units/mL CONCENTRATED U-500 injection pen Inject 50 Units under the skin 2 (two) times a day before meals 5 pen 5    methadone (DOLOPHINE) 10 MG/5ML solution Take 70 mg by mouth      omeprazole (PriLOSEC) 20 mg delayed release capsule Take 1 capsule (20 mg total) by mouth daily 90 capsule 3    potassium chloride (K-DUR,KLOR-CON) 20 mEq tablet Take 2 tablets (40 mEq total) by mouth daily 180 tablet 3    pravastatin (PRAVACHOL) 20 mg tablet Take 1 tablet (20 mg total) by mouth daily 90 tablet 1    isosorbide mononitrate (IMDUR) 120 mg 24 hr tablet Take 1 tablet (120 mg total) by mouth daily 90 tablet 1     No current facility-administered medications for this visit  Current Outpatient Medications on File Prior to Visit   Medication Sig    erythromycin (ILOTYCIN) ophthalmic ointment 0 5 inches daily at bedtime    furosemide (LASIX) 40 mg tablet Take 1 tablet (40 mg total) by mouth daily    insulin glargine (LANTUS) 100 units/mL subcutaneous injection Inject 20 Units under the skin daily Inject 50 units daily    Insulin Pen Needle 31G X 5 MM MISC by Does not apply route daily Pt to inject 4 X daily    Insulin Pen Needle 31G X 5 MM MISC 30 units sq 2X daily    Insulin Regular Human, Conc, (HUMULIN R U-500 KWIKPEN) 500 units/mL CONCENTRATED U-500 injection pen Inject 50 Units under the skin 2 (two) times a day before meals    methadone (DOLOPHINE) 10 MG/5ML solution Take 70 mg by mouth    omeprazole (PriLOSEC) 20 mg delayed release capsule Take 1 capsule (20 mg total) by mouth daily    potassium chloride (K-DUR,KLOR-CON) 20 mEq tablet Take 2 tablets (40 mEq total) by mouth daily    pravastatin (PRAVACHOL) 20 mg tablet Take 1 tablet (20 mg total) by mouth daily    [DISCONTINUED] lisinopril (ZESTRIL) 40 mg tablet Take 1 tablet (40 mg total) by mouth daily     No current facility-administered medications on file prior to visit  He is allergic to lisinopril       Review of Systems   Constitutional: Negative  HENT: Negative  Eyes: Negative  Respiratory: Negative  Cardiovascular: Negative  Gastrointestinal: Positive for abdominal pain, nausea and vomiting  Endocrine: Negative  Genitourinary: Positive for flank pain  Skin: Negative  Allergic/Immunologic: Negative  Neurological: Negative  Hematological: Negative  Psychiatric/Behavioral: Negative            Objective:      BP (!) 176/90   Pulse 102   Temp 99 3 °F (37 4 °C) (Tympanic)   Ht 5' 8" (1 727 m)   Wt 94 3 kg (207 lb 12 8 oz)   SpO2 97%   BMI 31 60 kg/m²          Physical Exam   Constitutional: He is oriented to person, place, and time  He appears well-developed and well-nourished  HENT:   Head: Normocephalic  Eyes: Pupils are equal, round, and reactive to light  Conjunctivae and EOM are normal    Neck: Normal range of motion  Neck supple  Cardiovascular: Normal rate and regular rhythm  Pulmonary/Chest: Effort normal and breath sounds normal    Abdominal: Soft  Bowel sounds are normal  He exhibits distension  Musculoskeletal: Normal range of motion  Neurological: He is alert and oriented to person, place, and time  Skin: Skin is warm and dry  Psychiatric: He has a normal mood and affect   His behavior is normal  Judgment and thought content normal

## 2020-12-09 ENCOUNTER — LAB (OUTPATIENT)
Dept: LAB | Facility: CLINIC | Age: 60
End: 2020-12-09
Payer: COMMERCIAL

## 2020-12-09 DIAGNOSIS — E87.1 HYPONATREMIA: ICD-10-CM

## 2020-12-09 DIAGNOSIS — I10 ESSENTIAL HYPERTENSION: ICD-10-CM

## 2020-12-09 DIAGNOSIS — E11.42 TYPE 2 DIABETES MELLITUS WITH DIABETIC POLYNEUROPATHY, WITH LONG-TERM CURRENT USE OF INSULIN (HCC): ICD-10-CM

## 2020-12-09 DIAGNOSIS — Z79.4 TYPE 2 DIABETES MELLITUS WITH DIABETIC POLYNEUROPATHY, WITH LONG-TERM CURRENT USE OF INSULIN (HCC): ICD-10-CM

## 2020-12-09 LAB
ANION GAP SERPL CALCULATED.3IONS-SCNC: 7 MMOL/L (ref 4–13)
BUN SERPL-MCNC: 26 MG/DL (ref 5–25)
CALCIUM SERPL-MCNC: 9.7 MG/DL (ref 8.3–10.1)
CHLORIDE SERPL-SCNC: 101 MMOL/L (ref 100–108)
CO2 SERPL-SCNC: 28 MMOL/L (ref 21–32)
CREAT SERPL-MCNC: 1.23 MG/DL (ref 0.6–1.3)
EST. AVERAGE GLUCOSE BLD GHB EST-MCNC: 286 MG/DL
GFR SERPL CREATININE-BSD FRML MDRD: 63 ML/MIN/1.73SQ M
GLUCOSE P FAST SERPL-MCNC: 322 MG/DL (ref 65–99)
HBA1C MFR BLD: 11.6 %
POTASSIUM SERPL-SCNC: 4.3 MMOL/L (ref 3.5–5.3)
SODIUM SERPL-SCNC: 136 MMOL/L (ref 136–145)

## 2020-12-09 PROCEDURE — 36415 COLL VENOUS BLD VENIPUNCTURE: CPT

## 2020-12-09 PROCEDURE — 84681 ASSAY OF C-PEPTIDE: CPT

## 2020-12-09 PROCEDURE — 80048 BASIC METABOLIC PNL TOTAL CA: CPT

## 2020-12-09 PROCEDURE — 83036 HEMOGLOBIN GLYCOSYLATED A1C: CPT

## 2020-12-09 PROCEDURE — 3046F HEMOGLOBIN A1C LEVEL >9.0%: CPT | Performed by: FAMILY MEDICINE

## 2020-12-10 LAB — C PEPTIDE SERPL-MCNC: 4.3 NG/ML (ref 1.1–4.4)

## 2020-12-11 ENCOUNTER — OFFICE VISIT (OUTPATIENT)
Dept: FAMILY MEDICINE CLINIC | Facility: CLINIC | Age: 60
End: 2020-12-11
Payer: COMMERCIAL

## 2020-12-11 VITALS
HEIGHT: 68 IN | TEMPERATURE: 98.1 F | HEART RATE: 97 BPM | SYSTOLIC BLOOD PRESSURE: 138 MMHG | OXYGEN SATURATION: 81 % | DIASTOLIC BLOOD PRESSURE: 76 MMHG | WEIGHT: 181.4 LBS | BODY MASS INDEX: 27.49 KG/M2

## 2020-12-11 DIAGNOSIS — Z23 ENCOUNTER FOR IMMUNIZATION: ICD-10-CM

## 2020-12-11 DIAGNOSIS — I10 ESSENTIAL HYPERTENSION: ICD-10-CM

## 2020-12-11 DIAGNOSIS — Z79.4 TYPE 2 DIABETES MELLITUS WITH DIABETIC POLYNEUROPATHY, WITH LONG-TERM CURRENT USE OF INSULIN (HCC): ICD-10-CM

## 2020-12-11 DIAGNOSIS — E11.42 TYPE 2 DIABETES MELLITUS WITH DIABETIC POLYNEUROPATHY, WITH LONG-TERM CURRENT USE OF INSULIN (HCC): Primary | ICD-10-CM

## 2020-12-11 DIAGNOSIS — Z79.4 TYPE 2 DIABETES MELLITUS WITH DIABETIC POLYNEUROPATHY, WITH LONG-TERM CURRENT USE OF INSULIN (HCC): Primary | ICD-10-CM

## 2020-12-11 DIAGNOSIS — E11.42 TYPE 2 DIABETES MELLITUS WITH DIABETIC POLYNEUROPATHY, WITH LONG-TERM CURRENT USE OF INSULIN (HCC): ICD-10-CM

## 2020-12-11 PROCEDURE — 3008F BODY MASS INDEX DOCD: CPT | Performed by: FAMILY MEDICINE

## 2020-12-11 PROCEDURE — 90471 IMMUNIZATION ADMIN: CPT | Performed by: FAMILY MEDICINE

## 2020-12-11 PROCEDURE — 90682 RIV4 VACC RECOMBINANT DNA IM: CPT | Performed by: FAMILY MEDICINE

## 2020-12-11 PROCEDURE — 3075F SYST BP GE 130 - 139MM HG: CPT | Performed by: FAMILY MEDICINE

## 2020-12-11 PROCEDURE — 1036F TOBACCO NON-USER: CPT | Performed by: FAMILY MEDICINE

## 2020-12-11 PROCEDURE — 99213 OFFICE O/P EST LOW 20 MIN: CPT | Performed by: FAMILY MEDICINE

## 2020-12-11 PROCEDURE — 3725F SCREEN DEPRESSION PERFORMED: CPT | Performed by: FAMILY MEDICINE

## 2020-12-11 PROCEDURE — 3078F DIAST BP <80 MM HG: CPT | Performed by: FAMILY MEDICINE

## 2020-12-11 RX ORDER — INSULIN GLARGINE 100 [IU]/ML
10 INJECTION, SOLUTION SUBCUTANEOUS
Qty: 100 UNITS | Refills: 6 | Status: SHIPPED | OUTPATIENT
Start: 2020-12-11 | End: 2021-02-25 | Stop reason: HOSPADM

## 2020-12-11 RX ORDER — INSULIN GLARGINE 100 [IU]/ML
10 INJECTION, SOLUTION SUBCUTANEOUS
Qty: 100 UNITS | Refills: 6 | Status: SHIPPED | OUTPATIENT
Start: 2020-12-11 | End: 2020-12-11 | Stop reason: SDUPTHER

## 2020-12-13 DIAGNOSIS — Z79.4 TYPE 2 DIABETES MELLITUS WITH DIABETIC POLYNEUROPATHY, WITH LONG-TERM CURRENT USE OF INSULIN (HCC): ICD-10-CM

## 2020-12-13 DIAGNOSIS — E11.42 TYPE 2 DIABETES MELLITUS WITH DIABETIC POLYNEUROPATHY, WITH LONG-TERM CURRENT USE OF INSULIN (HCC): ICD-10-CM

## 2020-12-14 RX ORDER — EMPAGLIFLOZIN 25 MG/1
TABLET, FILM COATED ORAL
Qty: 90 TABLET | Refills: 3 | OUTPATIENT
Start: 2020-12-14

## 2020-12-21 ENCOUNTER — TELEPHONE (OUTPATIENT)
Dept: ENDOCRINOLOGY | Facility: CLINIC | Age: 60
End: 2020-12-21

## 2020-12-21 NOTE — TELEPHONE ENCOUNTER
----- Message from Daksha Hernandez PA-C sent at 12/21/2020  3:42 PM EST -----  Please call the patient regarding his abnormal result  A1C remains elevated at 11 6% which is suggestive of very high Bg levels  Glucose elevated at 322  Advise patient to send BG log so adjustments can be made to treatment  Follow diabetic diet

## 2020-12-31 ENCOUNTER — APPOINTMENT (EMERGENCY)
Dept: RADIOLOGY | Facility: HOSPITAL | Age: 60
End: 2020-12-31
Payer: COMMERCIAL

## 2020-12-31 ENCOUNTER — HOSPITAL ENCOUNTER (EMERGENCY)
Facility: HOSPITAL | Age: 60
Discharge: HOME | End: 2020-12-31
Attending: EMERGENCY MEDICINE | Admitting: EMERGENCY MEDICINE
Payer: COMMERCIAL

## 2020-12-31 VITALS
HEIGHT: 68 IN | SYSTOLIC BLOOD PRESSURE: 170 MMHG | RESPIRATION RATE: 16 BRPM | BODY MASS INDEX: 27.28 KG/M2 | HEART RATE: 89 BPM | TEMPERATURE: 98.3 F | DIASTOLIC BLOOD PRESSURE: 78 MMHG | OXYGEN SATURATION: 95 % | WEIGHT: 180 LBS

## 2020-12-31 DIAGNOSIS — J12.82 PNEUMONIA DUE TO COVID-19 VIRUS: Primary | ICD-10-CM

## 2020-12-31 DIAGNOSIS — U07.1 PNEUMONIA DUE TO COVID-19 VIRUS: Primary | ICD-10-CM

## 2020-12-31 LAB
ALBUMIN SERPL BCP-MCNC: 4.1 G/DL (ref 3.5–5.7)
ALP SERPL-CCNC: 78 U/L (ref 55–165)
ALT SERPL W P-5'-P-CCNC: 19 U/L (ref 7–52)
ANION GAP SERPL CALCULATED.3IONS-SCNC: 10 MMOL/L (ref 4–13)
AST SERPL W P-5'-P-CCNC: 23 U/L (ref 13–39)
BASOPHILS # BLD AUTO: 0 THOUSANDS/ΜL (ref 0–0.1)
BASOPHILS NFR BLD AUTO: 0 % (ref 0–2)
BILIRUB SERPL-MCNC: 0.9 MG/DL (ref 0.2–1)
BUN SERPL-MCNC: 22 MG/DL (ref 7–25)
CALCIUM SERPL-MCNC: 9.7 MG/DL (ref 8.6–10.5)
CHLORIDE SERPL-SCNC: 95 MMOL/L (ref 98–107)
CO2 SERPL-SCNC: 27 MMOL/L (ref 21–31)
CREAT SERPL-MCNC: 0.95 MG/DL (ref 0.7–1.3)
EOSINOPHIL # BLD AUTO: 0 THOUSAND/ΜL (ref 0–0.61)
EOSINOPHIL NFR BLD AUTO: 0 % (ref 0–5)
ERYTHROCYTE [DISTWIDTH] IN BLOOD BY AUTOMATED COUNT: 13 % (ref 11.5–14.5)
FLUAV RNA RESP QL NAA+PROBE: NEGATIVE
FLUBV RNA RESP QL NAA+PROBE: NEGATIVE
GFR SERPL CREATININE-BSD FRML MDRD: 85 ML/MIN/1.73SQ M
GLUCOSE SERPL-MCNC: 350 MG/DL (ref 65–99)
HCT VFR BLD AUTO: 45.2 % (ref 42–47)
HGB BLD-MCNC: 15.2 G/DL (ref 14–18)
LACTATE SERPL-SCNC: 1.8 MMOL/L (ref 0.5–2)
LIPASE SERPL-CCNC: 20 U/L (ref 11–82)
LYMPHOCYTES # BLD AUTO: 0.3 THOUSANDS/ΜL (ref 0.6–4.47)
LYMPHOCYTES NFR BLD AUTO: 8 % (ref 21–51)
MCH RBC QN AUTO: 29.4 PG (ref 26–34)
MCHC RBC AUTO-ENTMCNC: 33.7 G/DL (ref 31–37)
MCV RBC AUTO: 87 FL (ref 81–99)
MONOCYTES # BLD AUTO: 0.3 THOUSAND/ΜL (ref 0.17–1.22)
MONOCYTES NFR BLD AUTO: 7 % (ref 2–12)
NEUTROPHILS # BLD AUTO: 3.4 THOUSANDS/ΜL (ref 1.4–6.5)
NEUTS SEG NFR BLD AUTO: 85 % (ref 42–75)
PLATELET # BLD AUTO: 100 THOUSANDS/UL (ref 149–390)
PLATELET BLD QL SMEAR: ABNORMAL
PMV BLD AUTO: 10.1 FL (ref 8.6–11.7)
POTASSIUM SERPL-SCNC: 4.5 MMOL/L (ref 3.5–5.5)
PROT SERPL-MCNC: 8.5 G/DL (ref 6.4–8.9)
RBC # BLD AUTO: 5.18 MILLION/UL (ref 4.3–5.9)
RBC MORPH BLD: NORMAL
RSV RNA RESP QL NAA+PROBE: NEGATIVE
SARS-COV-2 RNA RESP QL NAA+PROBE: POSITIVE
SODIUM SERPL-SCNC: 132 MMOL/L (ref 134–143)
TROPONIN I SERPL-MCNC: <0.03 NG/ML
WBC # BLD AUTO: 4 THOUSAND/UL (ref 4.8–10.8)

## 2020-12-31 PROCEDURE — 83690 ASSAY OF LIPASE: CPT | Performed by: EMERGENCY MEDICINE

## 2020-12-31 PROCEDURE — 99284 EMERGENCY DEPT VISIT MOD MDM: CPT

## 2020-12-31 PROCEDURE — 36415 COLL VENOUS BLD VENIPUNCTURE: CPT | Performed by: EMERGENCY MEDICINE

## 2020-12-31 PROCEDURE — 93005 ELECTROCARDIOGRAM TRACING: CPT

## 2020-12-31 PROCEDURE — 80053 COMPREHEN METABOLIC PANEL: CPT | Performed by: EMERGENCY MEDICINE

## 2020-12-31 PROCEDURE — 85025 COMPLETE CBC W/AUTO DIFF WBC: CPT | Performed by: EMERGENCY MEDICINE

## 2020-12-31 PROCEDURE — 83605 ASSAY OF LACTIC ACID: CPT | Performed by: EMERGENCY MEDICINE

## 2020-12-31 PROCEDURE — 96366 THER/PROPH/DIAG IV INF ADDON: CPT

## 2020-12-31 PROCEDURE — 96365 THER/PROPH/DIAG IV INF INIT: CPT

## 2020-12-31 PROCEDURE — 96375 TX/PRO/DX INJ NEW DRUG ADDON: CPT

## 2020-12-31 PROCEDURE — 71045 X-RAY EXAM CHEST 1 VIEW: CPT

## 2020-12-31 PROCEDURE — 87040 BLOOD CULTURE FOR BACTERIA: CPT | Performed by: EMERGENCY MEDICINE

## 2020-12-31 PROCEDURE — 99284 EMERGENCY DEPT VISIT MOD MDM: CPT | Performed by: EMERGENCY MEDICINE

## 2020-12-31 PROCEDURE — 84484 ASSAY OF TROPONIN QUANT: CPT | Performed by: EMERGENCY MEDICINE

## 2020-12-31 PROCEDURE — 0241U HB NFCT DS VIR RESP RNA 4 TRGT: CPT | Performed by: EMERGENCY MEDICINE

## 2020-12-31 RX ORDER — ONDANSETRON 2 MG/ML
4 INJECTION INTRAMUSCULAR; INTRAVENOUS ONCE
Status: COMPLETED | OUTPATIENT
Start: 2020-12-31 | End: 2020-12-31

## 2020-12-31 RX ORDER — ACETAMINOPHEN 325 MG/1
975 TABLET ORAL ONCE
Status: COMPLETED | OUTPATIENT
Start: 2020-12-31 | End: 2020-12-31

## 2020-12-31 RX ORDER — ONDANSETRON 4 MG/1
4 TABLET, ORALLY DISINTEGRATING ORAL EVERY 6 HOURS PRN
Qty: 20 TABLET | Refills: 0 | Status: SHIPPED | OUTPATIENT
Start: 2020-12-31 | End: 2021-01-04 | Stop reason: SDUPTHER

## 2020-12-31 RX ADMIN — ONDANSETRON 4 MG: 2 INJECTION INTRAMUSCULAR; INTRAVENOUS at 19:55

## 2020-12-31 RX ADMIN — ACETAMINOPHEN 975 MG: 325 TABLET ORAL at 21:26

## 2020-12-31 RX ADMIN — SODIUM CHLORIDE, SODIUM LACTATE, POTASSIUM CHLORIDE, AND CALCIUM CHLORIDE 1000 ML: .6; .31; .03; .02 INJECTION, SOLUTION INTRAVENOUS at 19:55

## 2020-12-31 NOTE — Clinical Note
Parish Doran was seen and treated in our emergency department on 12/31/2020  NO WORK UNTIL QUARANTINE IS FINISHED  FOLLOW CDC GUIDELINES FOR THE LATEST QUARANTINE RECOMMENDATIONS    Diagnosis:     Raghav Sofia    He may return on this date: If you have any questions or concerns, please don't hesitate to call        Rupa Villa RN    ______________________________           _______________          _______________  Hospital Representative                              Date                                Time

## 2021-01-01 NOTE — ED PROVIDER NOTES
History  Chief Complaint   Patient presents with    Vomiting     Pt states Monday he was exposed to someone who was exposed to someone with Covid and within a few hours pt began vomiting with weakness and fatigue   Fatigue     3 days of malaise, fatigue, vomiting nbnb, as well as constant nonradiating sternal cp (which he says he has had many times before) without interruption the whole 3 days  No f/c/diarrhea/abdo pain/urinary  No sob  Pt says he is taking his 70mg methadone daily  Prior to Admission Medications   Prescriptions Last Dose Informant Patient Reported? Taking?    Blood Glucose Monitoring Suppl (Nicola Pratt) w/Device KIT 2020 at Unknown time Self No Yes   Sig: Use to test blood sugars 3 times daily   Empagliflozin (Jardiance) 25 MG TABS 2020 at Unknown time  No Yes   Sig: Take 1 tablet (25 mg total) by mouth every morning   Insulin Pen Needle 31G X 5 MM MISC 2020 at Unknown time Self No Yes   Sig: by Does not apply route daily Pt to inject 4 X daily   Insulin Pen Needle 31G X 5 MM MISC 2020 at Unknown time Self No Yes   Si units sq 2X daily   ergocalciferol (VITAMIN D2) 50,000 units Past Week at Unknown time  No Yes   Sig: Take 1 capsule (50,000 Units total) by mouth once a week   erythromycin (ILOTYCIN) ophthalmic ointment Not Taking at Unknown time Self Yes No   Si 5 inches daily at bedtime   furosemide (LASIX) 40 mg tablet 2020 at Unknown time Self No Yes   Sig: Take 1 tablet (40 mg total) by mouth daily   Patient taking differently: Take 40 mg by mouth as needed (on off work days)    gabapentin (NEURONTIN) 100 mg capsule 2020 at Unknown time  No Yes   Sig: Take 1 capsule (100 mg total) by mouth 3 (three) times a day   glucose blood (OneTouch Verio) test strip 2020 at Unknown time  No Yes   Sig: Use to test blood sugars 3 times daily   insulin NPH-insulin regular (NovoLIN 70/30) 100 units/mL subcutaneous injection 2020 at Unknown time  No Yes   Sig: Inject 40 Units under the skin 2 (two) times a day before meals   Patient taking differently: Inject 40 Units under the skin 2 (two) times a day before meals Pt states he takes it "once in awhile"   insulin glargine (LANTUS) 100 units/mL subcutaneous injection 12/30/2020 at Unknown time  No Yes   Sig: Inject 10 Units under the skin daily at bedtime   Patient taking differently: Inject 20 Units under the skin daily at bedtime    isosorbide mononitrate (IMDUR) 120 mg 24 hr tablet 12/31/2020 at Unknown time  No Yes   Sig: Take 1 tablet (120 mg total) by mouth daily   lisinopril-hydrochlorothiazide (PRINZIDE,ZESTORETIC) 20-25 MG per tablet 12/31/2020 at Unknown time  No Yes   Sig: Take 1 tablet by mouth daily   methadone (DOLOPHINE) 10 MG/5ML solution 12/31/2020 at Unknown time Self Yes Yes   Sig: Take 70 mg by mouth   omeprazole (PriLOSEC) 20 mg delayed release capsule 12/30/2020 at Unknown time  No Yes   Sig: Take 1 capsule (20 mg total) by mouth daily   potassium chloride (K-DUR,KLOR-CON) 20 mEq tablet Past Week at Unknown time Self No Yes   Sig: Take 2 tablets (40 mEq total) by mouth daily   pravastatin (PRAVACHOL) 40 mg tablet Past Week at Unknown time  No Yes   Sig: Take 1 tablet (40 mg total) by mouth daily   promethazine (PHENERGAN) 25 mg tablet 12/31/2020 at Unknown time  No Yes   Sig: TAKE 1 TABLET TWICE A DAY   sitaGLIPtin-metFORMIN (JANUMET)  MG per tablet 12/31/2020 at Unknown time  No Yes   Sig: Take 1 tablet by mouth 2 (two) times a day with meals      Facility-Administered Medications: None       Past Medical History:   Diagnosis Date    [facility] mellitus ([facility] )     GERD (gastroesophageal reflux disease)     Hypercholesteremia     Hypertension     Methadone use ([place] [id number] )        History reviewed  No pertinent surgical history      Family History   Problem Relation Age of Onset    Diabetes Mother     Hypertension Father     Leukemia Father     Acute lymphoblastic leukemia Father     Cancer Sister      I have reviewed and agree with the history as documented  E-Cigarette/Vaping    E-Cigarette Use Never User      E-Cigarette/Vaping Substances    Nicotine No     THC No     CBD No     Flavoring No     Other No     Unknown No      Social History     Tobacco Use    Smoking status: Former Smoker    Smokeless tobacco: Never Used   Substance Use Topics    Alcohol use: No    Drug use: No       Review of Systems   Constitutional: Positive for fatigue  Negative for fever  HENT: Negative for rhinorrhea  Eyes: Negative for visual disturbance  Respiratory: Negative for shortness of breath  Cardiovascular: Positive for chest pain  Gastrointestinal: Positive for vomiting  Negative for abdominal pain and diarrhea  Endocrine: Negative for polydipsia  Genitourinary: Negative for dysuria, frequency and hematuria  Musculoskeletal: Negative for neck stiffness  Skin: Negative for rash  Allergic/Immunologic: Negative for immunocompromised state  Neurological: Negative for speech difficulty, weakness and numbness  Psychiatric/Behavioral: Negative for suicidal ideas  Physical Exam  Physical Exam  Constitutional:       General: He is not in acute distress  HENT:      Head: Normocephalic and atraumatic  Eyes:      Conjunctiva/sclera: Conjunctivae normal    Neck:      Musculoskeletal: Normal range of motion and neck supple  Cardiovascular:      Rate and Rhythm: Regular rhythm  Heart sounds: Normal heart sounds  Pulmonary:      Effort: Pulmonary effort is normal       Breath sounds: Normal breath sounds  Abdominal:      General: Bowel sounds are normal       Palpations: Abdomen is soft  There is no mass  Tenderness: There is no abdominal tenderness  There is no guarding  Skin:     General: Skin is warm and dry  Neurological:      Mental Status: He is alert and oriented to person, place, and time     Psychiatric:         Mood and Affect: Mood normal          Vital Signs  ED Triage Vitals [12/31/20 1909]   Temperature Pulse Respirations Blood Pressure SpO2   98 3 °F (36 8 °C) 84 20 (!) 177/87 97 %      Temp Source Heart Rate Source Patient Position - Orthostatic VS BP Location FiO2 (%)   Temporal Monitor -- Left arm --      Pain Score       No Pain           Vitals:    12/31/20 1909   BP: (!) 177/87   Pulse: 84         Visual Acuity      ED Medications  Medications   lactated ringers bolus 1,000 mL (1,000 mL Intravenous New Bag 12/31/20 1955)   ondansetron (ZOFRAN) injection 4 mg (4 mg Intravenous Given 12/31/20 1955)       Diagnostic Studies  Results Reviewed     Procedure Component Value Units Date/Time    COVID19, Influenza A/B, RSV PCR, [facility] [[id number]]  (Abnormal) Collected: 12/31/20 1948    Lab Status: Final result Specimen: Nares from Nose Updated: 12/31/20 2047     SARS-CoV-2 Positive     INFLUENZA A PCR Negative     INFLUENZA B PCR Negative     RSV PCR Negative    Narrative: This test has been authorized by FDA under an EUA (Emergency Use Assay) for use by authorized laboratories  Clinical caution and judgement should be used with the interpretation of these results with consideration of the clinical impression and other laboratory testing  Testing reported as "Positive" or "Negative" has been proven to be accurate according to standard laboratory validation requirements  All testing is performed with control materials showing appropriate reactivity at standard intervals      Comprehensive metabolic panel [[id number]]  (Abnormal) Collected: 12/31/20 1955    Lab Status: Final result Specimen: Blood from Arm, Right Updated: 12/31/20 2024     Sodium 132 mmol/L      Potassium 4 5 mmol/L      Chloride 95 mmol/L      CO2 27 mmol/L      ANION GAP 10 mmol/L      BUN 22 mg/dL      Creatinine 0 95 mg/dL      Glucose 350 mg/dL      Calcium 9 7 mg/dL      AST 23 U/L      ALT 19 U/L      Alkaline Phosphatase 78 U/L      Total Protein 8 5 g/dL Albumin 4 1 g/dL      Total Bilirubin 0 90 mg/dL      eGFR 85 ml/min/1 73sq m     Narrative:      [place] guidelines for Chronic Kidney Disease (CKD):     Stage 1 with normal or high GFR (GFR > 90 mL/min/1 73 square meters)    Stage 2 Mild CKD (GFR = 60-89 mL/min/1 73 square meters)    Stage 3A Moderate CKD (GFR = 45-59 mL/min/1 73 square meters)    Stage 3B Moderate CKD (GFR = 30-44 mL/min/1 73 square meters)    Stage 4 Severe CKD (GFR = 15-29 mL/min/1 73 square meters)    Stage 5 End Stage CKD (GFR <15 mL/min/1 73 square meters)  Note: GFR calculation is accurate only with a steady state creatinine    Troponin I [[id number]]  (Normal) Collected: 12/31/20 1955    Lab Status: Final result Specimen: Blood from Arm, Right Updated: 12/31/20 2023     Troponin I <0 03 ng/mL     Lipase [[id number]]  (Normal) Collected: 12/31/20 1955    Lab Status: Final result Specimen: Blood from Arm, Right Updated: 12/31/20 2022     Lipase 20 u/L     Lactic acid [[id number]]  (Normal) Collected: 12/31/20 1955    Lab Status: Final result Specimen: Blood from Arm, Right Updated: 12/31/20 2021     LACTIC ACID 1 8 mmol/L     Narrative:      Result may be elevated if tourniquet was used during collection      Smear Review(Phlebs Do Not Order) [[id number]]  (Abnormal) Collected: 12/31/20 1955    Lab Status: Final result Specimen: Blood from Arm, Right Updated: 12/31/20 2021     RBC Morphology Normal     Platelet Estimate Decreased    CBC and differential [[id number]]  (Abnormal) Collected: 12/31/20 1955    Lab Status: Final result Specimen: Blood from Arm, Right Updated: 12/31/20 2013     WBC 4 00 Thousand/uL      RBC 5 18 Million/uL      Hemoglobin 15 2 g/dL      Hematocrit 45 2 %      MCV 87 fL      MCH 29 4 pg      MCHC 33 7 g/dL      RDW 13 0 %      MPV 10 1 fL      Platelets 523 Thousands/uL      Neutrophils Relative 85 %      Lymphocytes Relative 8 %      Monocytes Relative 7 %      Eosinophils Relative 0 % Basophils Relative 0 %      Neutrophils Absolute 3 40 Thousands/µL      Lymphocytes Absolute 0 30 Thousands/µL      Monocytes Absolute 0 30 Thousand/µL      Eosinophils Absolute 0 00 Thousand/µL      Basophils Absolute 0 00 Thousands/µL     Troponin I repeat in 3hrs [[id number]]     Lab Status: No result Specimen: Blood     Blood culture #1 [[id number]] Collected: 12/31/20 1955    Lab Status: In process Specimen: Blood from Arm, Right Updated: 12/31/20 2001    Blood culture #2 [[id number]] Collected: 12/31/20 1955    Lab Status: In process Specimen: Blood from Arm, Right Updated: 12/31/20 2001                 XR chest 1 view portable   Final Result by Christiana Bowie MD (12/31 2039)      Mild right basilar airspace disease suspicious for pneumonia; Covid pneumonia can have this appearance  The study was marked in [facility] for immediate notification  Workstation performed: [id number]                    Procedures  Procedures         ED Course                                           MDM  Number of Diagnoses or Management Options  Pneumonia due to COVID-19 virus:   Diagnosis management comments: covid 19 with pneumonia and vomiting, but saturating well, no resp distress, dc with zofran and advise pulse ox and precautions for return      Disposition  Final diagnoses:   Pneumonia due to COVID-19 virus     Time reflects when diagnosis was documented in both MDM as applicable and the Disposition within this note     Time User Action Codes Description Comment    12/31/2020  8:55 PM Leigh Ann Briones Add [U07 1,  J12 89] Pneumonia due to COVID-19 virus       ED Disposition     ED Disposition Condition Date/Time Comment    Discharge Stable u Dec 31, 2020  8:55 PM Michelle Arguello discharge to home/self care              Follow-up Information     Follow up With Specialties Details Why Contact Info    Breezy Johnson, [address], Nurse Practitioner Schedule an appointment as soon as possible for a visit in 2 days  [address] [facility]  [phone]            Patient's Medications   Discharge Prescriptions    ONDANSETRON (ZOFRAN-ODT) 4 MG DISINTEGRATING TABLET    Take 1 tablet (4 mg total) by mouth every 6 (six) hours as needed for nausea or vomiting       Start Date: 12/31/2020End Date: --       Order Dose: 4 mg       Quantity: 20 tablet    Refills: 0     No discharge procedures on file      PDMP Review     None          ED Provider  Electronically Signed by           Mignon Soni MD  12/31/20 0508

## 2021-01-01 NOTE — DISCHARGE INSTRUCTIONS
At this time no treatments for COVID-19  have been definitively proven to improve symptoms or outcomes, however you may choose to take the following over-the-counter supplements for one week:  Vitamin D: 2000 IU daily  Vitamin C: 1000mg twice each day  Zinc 220mg daily  Aspirin 325mg daily    We recommend you purchase a pulse oximeter and check your oxygen saturation daily  Make sure you wait for a consistent number before you take the reading  If your oxygen saturation is below 92% on a sustained reading then return to the ER immediately

## 2021-01-03 ENCOUNTER — HOSPITAL ENCOUNTER (EMERGENCY)
Facility: HOSPITAL | Age: 61
Discharge: HOME | End: 2021-01-04
Attending: EMERGENCY MEDICINE | Admitting: EMERGENCY MEDICINE
Payer: COMMERCIAL

## 2021-01-03 ENCOUNTER — APPOINTMENT (EMERGENCY)
Dept: RADIOLOGY | Facility: HOSPITAL | Age: 61
End: 2021-01-03
Payer: COMMERCIAL

## 2021-01-03 DIAGNOSIS — R11.2 NAUSEA & VOMITING: Primary | ICD-10-CM

## 2021-01-03 DIAGNOSIS — U07.1 PNEUMONIA DUE TO COVID-19 VIRUS: ICD-10-CM

## 2021-01-03 DIAGNOSIS — J12.82 PNEUMONIA DUE TO COVID-19 VIRUS: ICD-10-CM

## 2021-01-03 LAB
ALBUMIN SERPL BCP-MCNC: 3.3 G/DL (ref 3.5–5.7)
ALP SERPL-CCNC: 55 U/L (ref 55–165)
ALT SERPL W P-5'-P-CCNC: 21 U/L (ref 7–52)
ANION GAP SERPL CALCULATED.3IONS-SCNC: 9 MMOL/L (ref 4–13)
APTT PPP: 41 SECONDS (ref 23–37)
AST SERPL W P-5'-P-CCNC: 38 U/L (ref 13–39)
BILIRUB SERPL-MCNC: 0.6 MG/DL (ref 0.2–1)
BUN SERPL-MCNC: 26 MG/DL (ref 7–25)
CALCIUM ALBUM COR SERPL-MCNC: 8.6 MG/DL (ref 8.3–10.1)
CALCIUM SERPL-MCNC: 8 MG/DL (ref 8.6–10.5)
CHLORIDE SERPL-SCNC: 94 MMOL/L (ref 98–107)
CO2 SERPL-SCNC: 26 MMOL/L (ref 21–31)
CREAT SERPL-MCNC: 0.93 MG/DL (ref 0.7–1.3)
ERYTHROCYTE [DISTWIDTH] IN BLOOD BY AUTOMATED COUNT: 13.2 % (ref 11.5–14.5)
GFR SERPL CREATININE-BSD FRML MDRD: 88 ML/MIN/1.73SQ M
GLUCOSE SERPL-MCNC: 273 MG/DL (ref 65–140)
GLUCOSE SERPL-MCNC: 302 MG/DL (ref 65–99)
HCT VFR BLD AUTO: 42.9 % (ref 42–47)
HGB BLD-MCNC: 14.5 G/DL (ref 14–18)
INR PPP: 1.1 (ref 0.84–1.19)
LYMPHOCYTES # BLD AUTO: 0.49 THOUSAND/UL (ref 0.6–4.47)
LYMPHOCYTES # BLD AUTO: 18 % (ref 20–51)
MCH RBC QN AUTO: 29.1 PG (ref 26–34)
MCHC RBC AUTO-ENTMCNC: 33.8 G/DL (ref 31–37)
MCV RBC AUTO: 86 FL (ref 81–99)
MONOCYTES # BLD AUTO: 0.22 THOUSAND/UL (ref 0–1.22)
MONOCYTES NFR BLD AUTO: 8 % (ref 4–12)
NEUTS SEG # BLD: 2 THOUSAND/UL (ref 1.81–6.82)
NEUTS SEG NFR BLD AUTO: 74 % (ref 42–75)
PLATELET # BLD AUTO: 79 THOUSANDS/UL (ref 149–390)
PLATELET BLD QL SMEAR: ABNORMAL
PMV BLD AUTO: 10.1 FL (ref 8.6–11.7)
POTASSIUM SERPL-SCNC: 4.3 MMOL/L (ref 3.5–5.5)
PROT SERPL-MCNC: 6.9 G/DL (ref 6.4–8.9)
PROTHROMBIN TIME: 14.1 SECONDS (ref 11.6–14.5)
RBC # BLD AUTO: 4.98 MILLION/UL (ref 4.3–5.9)
RBC MORPH BLD: NORMAL
SODIUM SERPL-SCNC: 129 MMOL/L (ref 134–143)
TOTAL CELLS COUNTED SPEC: 100
WBC # BLD AUTO: 2.7 THOUSAND/UL (ref 4.8–10.8)

## 2021-01-03 PROCEDURE — 96374 THER/PROPH/DIAG INJ IV PUSH: CPT

## 2021-01-03 PROCEDURE — 71045 X-RAY EXAM CHEST 1 VIEW: CPT

## 2021-01-03 PROCEDURE — 36415 COLL VENOUS BLD VENIPUNCTURE: CPT | Performed by: EMERGENCY MEDICINE

## 2021-01-03 PROCEDURE — 85027 COMPLETE CBC AUTOMATED: CPT | Performed by: EMERGENCY MEDICINE

## 2021-01-03 PROCEDURE — 82948 REAGENT STRIP/BLOOD GLUCOSE: CPT

## 2021-01-03 PROCEDURE — 80053 COMPREHEN METABOLIC PANEL: CPT | Performed by: EMERGENCY MEDICINE

## 2021-01-03 PROCEDURE — 85730 THROMBOPLASTIN TIME PARTIAL: CPT | Performed by: EMERGENCY MEDICINE

## 2021-01-03 PROCEDURE — 85610 PROTHROMBIN TIME: CPT | Performed by: EMERGENCY MEDICINE

## 2021-01-03 PROCEDURE — 99285 EMERGENCY DEPT VISIT HI MDM: CPT | Performed by: EMERGENCY MEDICINE

## 2021-01-03 PROCEDURE — 99283 EMERGENCY DEPT VISIT LOW MDM: CPT

## 2021-01-03 PROCEDURE — 85007 BL SMEAR W/DIFF WBC COUNT: CPT | Performed by: EMERGENCY MEDICINE

## 2021-01-03 PROCEDURE — 96361 HYDRATE IV INFUSION ADD-ON: CPT

## 2021-01-03 RX ORDER — ONDANSETRON 2 MG/ML
4 INJECTION INTRAMUSCULAR; INTRAVENOUS ONCE
Status: COMPLETED | OUTPATIENT
Start: 2021-01-03 | End: 2021-01-03

## 2021-01-03 RX ADMIN — SODIUM CHLORIDE 1000 ML: 0.9 INJECTION, SOLUTION INTRAVENOUS at 20:00

## 2021-01-03 RX ADMIN — ONDANSETRON 4 MG: 2 INJECTION INTRAMUSCULAR; INTRAVENOUS at 20:00

## 2021-01-04 VITALS
HEIGHT: 68 IN | DIASTOLIC BLOOD PRESSURE: 76 MMHG | HEART RATE: 77 BPM | WEIGHT: 179.9 LBS | TEMPERATURE: 97.3 F | OXYGEN SATURATION: 94 % | BODY MASS INDEX: 27.26 KG/M2 | RESPIRATION RATE: 16 BRPM | SYSTOLIC BLOOD PRESSURE: 131 MMHG

## 2021-01-04 VITALS
SYSTOLIC BLOOD PRESSURE: 129 MMHG | OXYGEN SATURATION: 99 % | RESPIRATION RATE: 16 BRPM | HEART RATE: 68 BPM | TEMPERATURE: 97.6 F | DIASTOLIC BLOOD PRESSURE: 77 MMHG

## 2021-01-04 DIAGNOSIS — U07.1 COVID-19: ICD-10-CM

## 2021-01-04 DIAGNOSIS — E86.0 MILD DEHYDRATION: Primary | ICD-10-CM

## 2021-01-04 DIAGNOSIS — R11.2 NAUSEA & VOMITING: ICD-10-CM

## 2021-01-04 LAB
ALBUMIN SERPL BCP-MCNC: 3.6 G/DL (ref 3.5–5.7)
ALP SERPL-CCNC: 62 U/L (ref 55–165)
ALT SERPL W P-5'-P-CCNC: 27 U/L (ref 7–52)
ANION GAP SERPL CALCULATED.3IONS-SCNC: 13 MMOL/L (ref 4–13)
AST SERPL W P-5'-P-CCNC: 55 U/L (ref 13–39)
ATRIAL RATE: 82 BPM
BILIRUB SERPL-MCNC: 0.7 MG/DL (ref 0.2–1)
BUN SERPL-MCNC: 23 MG/DL (ref 7–25)
CALCIUM SERPL-MCNC: 8.7 MG/DL (ref 8.6–10.5)
CHLORIDE SERPL-SCNC: 93 MMOL/L (ref 98–107)
CO2 SERPL-SCNC: 24 MMOL/L (ref 21–31)
CREAT SERPL-MCNC: 0.96 MG/DL (ref 0.7–1.3)
DACRYOCYTES BLD QL SMEAR: PRESENT
ERYTHROCYTE [DISTWIDTH] IN BLOOD BY AUTOMATED COUNT: 13.4 % (ref 11.5–14.5)
GFR SERPL CREATININE-BSD FRML MDRD: 84 ML/MIN/1.73SQ M
GIANT PLATELETS BLD QL SMEAR: PRESENT
GLUCOSE SERPL-MCNC: 296 MG/DL (ref 65–99)
HCT VFR BLD AUTO: 43.4 % (ref 42–47)
HGB BLD-MCNC: 14.7 G/DL (ref 14–18)
LACTATE SERPL-SCNC: 1.1 MMOL/L (ref 0.5–2)
LYMPHOCYTES # BLD AUTO: 0.27 THOUSAND/UL (ref 0.6–4.47)
LYMPHOCYTES # BLD AUTO: 10 % (ref 20–51)
MCH RBC QN AUTO: 29.2 PG (ref 26–34)
MCHC RBC AUTO-ENTMCNC: 33.8 G/DL (ref 31–37)
MCV RBC AUTO: 86 FL (ref 81–99)
MONOCYTES # BLD AUTO: 0.27 THOUSAND/UL (ref 0–1.22)
MONOCYTES NFR BLD AUTO: 10 % (ref 4–12)
NEUTS BAND NFR BLD MANUAL: 4 % (ref 0–8)
NEUTS SEG # BLD: 2.16 THOUSAND/UL (ref 1.81–6.82)
NEUTS SEG NFR BLD AUTO: 76 % (ref 42–75)
OVALOCYTES BLD QL SMEAR: PRESENT
P AXIS: 60 DEGREES
PLATELET # BLD AUTO: 75 THOUSANDS/UL (ref 149–390)
PLATELET BLD QL SMEAR: ABNORMAL
PMV BLD AUTO: 10.3 FL (ref 8.6–11.7)
POLYCHROMASIA BLD QL SMEAR: PRESENT
POTASSIUM SERPL-SCNC: 4.4 MMOL/L (ref 3.5–5.5)
PR INTERVAL: 130 MS
PROT SERPL-MCNC: 7.8 G/DL (ref 6.4–8.9)
QRS AXIS: 50 DEGREES
QRSD INTERVAL: 74 MS
QT INTERVAL: 364 MS
QTC INTERVAL: 425 MS
RBC # BLD AUTO: 5.02 MILLION/UL (ref 4.3–5.9)
RBC MORPH BLD: ABNORMAL
SODIUM SERPL-SCNC: 130 MMOL/L (ref 134–143)
T WAVE AXIS: 65 DEGREES
TOTAL CELLS COUNTED SPEC: 100
VENTRICULAR RATE: 82 BPM
WBC # BLD AUTO: 2.7 THOUSAND/UL (ref 4.8–10.8)

## 2021-01-04 PROCEDURE — 85007 BL SMEAR W/DIFF WBC COUNT: CPT | Performed by: EMERGENCY MEDICINE

## 2021-01-04 PROCEDURE — 99284 EMERGENCY DEPT VISIT MOD MDM: CPT

## 2021-01-04 PROCEDURE — 85027 COMPLETE CBC AUTOMATED: CPT | Performed by: EMERGENCY MEDICINE

## 2021-01-04 PROCEDURE — 93005 ELECTROCARDIOGRAM TRACING: CPT

## 2021-01-04 PROCEDURE — 36415 COLL VENOUS BLD VENIPUNCTURE: CPT | Performed by: EMERGENCY MEDICINE

## 2021-01-04 PROCEDURE — 99284 EMERGENCY DEPT VISIT MOD MDM: CPT | Performed by: EMERGENCY MEDICINE

## 2021-01-04 PROCEDURE — 93010 ELECTROCARDIOGRAM REPORT: CPT | Performed by: INTERNAL MEDICINE

## 2021-01-04 PROCEDURE — 80053 COMPREHEN METABOLIC PANEL: CPT | Performed by: EMERGENCY MEDICINE

## 2021-01-04 PROCEDURE — 96374 THER/PROPH/DIAG INJ IV PUSH: CPT

## 2021-01-04 PROCEDURE — 83605 ASSAY OF LACTIC ACID: CPT | Performed by: EMERGENCY MEDICINE

## 2021-01-04 PROCEDURE — 96361 HYDRATE IV INFUSION ADD-ON: CPT

## 2021-01-04 RX ORDER — ONDANSETRON 4 MG/1
4 TABLET, ORALLY DISINTEGRATING ORAL EVERY 6 HOURS PRN
Qty: 20 TABLET | Refills: 0 | Status: SHIPPED | OUTPATIENT
Start: 2021-01-04 | End: 2021-02-25 | Stop reason: HOSPADM

## 2021-01-04 RX ORDER — ONDANSETRON 2 MG/ML
4 INJECTION INTRAMUSCULAR; INTRAVENOUS ONCE
Status: COMPLETED | OUTPATIENT
Start: 2021-01-04 | End: 2021-01-04

## 2021-01-04 RX ADMIN — SODIUM CHLORIDE 1000 ML: 0.9 INJECTION, SOLUTION INTRAVENOUS at 17:35

## 2021-01-04 RX ADMIN — ONDANSETRON 4 MG: 2 INJECTION INTRAMUSCULAR; INTRAVENOUS at 17:36

## 2021-01-04 NOTE — DISCHARGE INSTRUCTIONS
RETURN IF WORSE IN ANY WAY:   CHEST PAIN, SHORTNESS OF BREATH, INCREASED PAIN, WORSENING FEVER OR FLU LIKE SYMPTOMS, OR NEW AND CONCERNING SYMPTOMS SIGNS OR SYMPTOMS    PLEASE CALL YOUR PRIMARY DOCTOR IN THE MORNING TO SET UP FOLLOW UP   PLEASE REVIEW THE WORK UP RESULTS WITH YOUR DOCTOR      PLEASE CONTINUE TO MONITOR AND TREAT YOUR BLOOD SUGAR AT HOME  PLEASE BE IN CLOSE COMMUNICATION WITH YOUR DOCTOR ABOUT YOUR BLOOD SUGAR LEVELS

## 2021-01-04 NOTE — ED PROVIDER NOTES
History  Chief Complaint   Patient presents with    Vomiting     COVID +, cant keep fluids down     Diarrhea     61-year-old male presents emergency room complaining of vomiting and dehydration  Patient notes he was here yesterday, because his oral Zofran isn't working for him like the IV dose does in the ER  He states that he has no belly pain but since he had contracted COVID he has been nauseated and having vomiting  Said he vomits about 5 to 6 times a day  He denies any significant pain  He currently is looking to get IV fluids and some more IV Zofran  Patient states the medicine seems to last about a day and half when given to him  Denies any fever, headache, or abdominal pain  Patient is diabetic and notes his last glucose was 218  Prior to Admission Medications   Prescriptions Last Dose Informant Patient Reported? Taking?    Blood Glucose Monitoring Suppl (Anny Villa) w/Device KIT  Self No Yes   Sig: Use to test blood sugars 3 times daily   Empagliflozin (Jardiance) 25 MG TABS   No Yes   Sig: Take 1 tablet (25 mg total) by mouth every morning   Insulin Pen Needle 31G X 5 MM MISC  Self No Yes   Sig: by Does not apply route daily Pt to inject 4 X daily   Insulin Pen Needle 31G X 5 MM MISC  Self No Yes   Si units sq 2X daily   ergocalciferol (VITAMIN D2) 50,000 units   No Yes   Sig: Take 1 capsule (50,000 Units total) by mouth once a week   erythromycin (ILOTYCIN) ophthalmic ointment  Self Yes Yes   Si 5 inches daily at bedtime   furosemide (LASIX) 40 mg tablet  Self No No   Sig: Take 1 tablet (40 mg total) by mouth daily   Patient taking differently: Take 40 mg by mouth as needed (on off work days)    gabapentin (NEURONTIN) 100 mg capsule   No Yes   Sig: Take 1 capsule (100 mg total) by mouth 3 (three) times a day   glucose blood (OneTouch Verio) test strip   No No   Sig: Use to test blood sugars 3 times daily   insulin NPH-insulin regular (NovoLIN 70/30) 100 units/mL subcutaneous injection   No No   Sig: Inject 40 Units under the skin 2 (two) times a day before meals   Patient taking differently: Inject 40 Units under the skin 2 (two) times a day before meals Pt states he takes it "once in awhile"   insulin glargine (LANTUS) 100 units/mL subcutaneous injection   No No   Sig: Inject 10 Units under the skin daily at bedtime   Patient taking differently: Inject 20 Units under the skin daily at bedtime    isosorbide mononitrate (IMDUR) 120 mg 24 hr tablet   No Yes   Sig: Take 1 tablet (120 mg total) by mouth daily   lisinopril-hydrochlorothiazide (PRINZIDE,ZESTORETIC) 20-25 MG per tablet   No Yes   Sig: Take 1 tablet by mouth daily   methadone (DOLOPHINE) 10 MG/5ML solution  Self Yes Yes   Sig: Take 70 mg by mouth   omeprazole (PriLOSEC) 20 mg delayed release capsule   No Yes   Sig: Take 1 capsule (20 mg total) by mouth daily   ondansetron (ZOFRAN-ODT) 4 mg disintegrating tablet   No Yes   Sig: Take 1 tablet (4 mg total) by mouth every 6 (six) hours as needed for nausea or vomiting   potassium chloride (K-DUR,KLOR-CON) 20 mEq tablet  Self No Yes   Sig: Take 2 tablets (40 mEq total) by mouth daily   pravastatin (PRAVACHOL) 40 mg tablet   No Yes   Sig: Take 1 tablet (40 mg total) by mouth daily   promethazine (PHENERGAN) 25 mg tablet   No Yes   Sig: TAKE 1 TABLET TWICE A DAY   sitaGLIPtin-metFORMIN (JANUMET)  MG per tablet   No Yes   Sig: Take 1 tablet by mouth 2 (two) times a day with meals      Facility-Administered Medications: None       Past Medical History:   Diagnosis Date    Diabetes mellitus (HCC)     GERD (gastroesophageal reflux disease)     Hypercholesteremia     Hypertension     [facility]adone use ([facility] )        History reviewed  No pertinent surgical history      Family History   Problem Relation Age of Onset    Diabetes Mother     Hypertension Father     Leukemia Father     Acute lymphoblastic leukemia Father     Cancer Sister      I have reviewed and agree with the history as documented  E-Cigarette/Vaping    E-Cigarette Use Never User      E-Cigarette/Vaping Substances    Nicotine No     THC No     CBD No     Flavoring No     Other No     Unknown No      Social History     Tobacco Use    Smoking status: Former Smoker    Smokeless tobacco: Never Used   Substance Use Topics    Alcohol use: No    Drug use: No     Comment: methadone       Review of Systems   Constitutional: Positive for activity change and fatigue  Negative for diaphoresis and fever  Gastrointestinal: Positive for diarrhea, nausea and vomiting  Negative for abdominal distention and abdominal pain  Patient notes vomiting today due to using laxatives  He notes that he felt like he "needed to go "   Genitourinary: Negative for difficulty urinating  Musculoskeletal: Negative for back pain  Physical Exam  Physical Exam  Constitutional:       General: He is not in acute distress  Appearance: Normal appearance  HENT:      Head: Normocephalic and atraumatic  Nose: Nose normal       Mouth/Throat:      Mouth: Mucous membranes are moist    Eyes:      Extraocular Movements: Extraocular movements intact  Neck:      Musculoskeletal: Normal range of motion  No muscular tenderness  Cardiovascular:      Rate and Rhythm: Normal rate and regular rhythm  Pulmonary:      Effort: Pulmonary effort is normal       Breath sounds: Normal breath sounds  Abdominal:      General: Abdomen is flat  Bowel sounds are normal       Palpations: Abdomen is soft  Musculoskeletal: Normal range of motion  Skin:     General: Skin is warm and dry  Capillary Refill: Capillary refill takes less than 2 seconds  Neurological:      Mental Status: He is alert           Vital Signs  ED Triage Vitals [01/04/21 1654]   Temperature Pulse Respirations Blood Pressure SpO2   (!) 97 2 °F (36 2 °C) 68 16 155/68 93 %      Temp Source Heart Rate Source Patient Position - Orthostatic VS BP Location FiO2 (%)   Temporal Monitor;Left Sitting Left arm --      Pain Score       --           Vitals:    01/04/21 1654 01/04/21 1926   BP: 155/68 131/76   Pulse: 68 77   Patient Position - Orthostatic VS: Sitting Lying         Visual Acuity      ED Medications  Medications   sodium chloride 0 9 % bolus 1,000 mL (0 mL Intravenous Stopped 1/4/21 1835)   ondansetron (ZOFRAN) injection 4 mg (4 mg Intravenous Given 1/4/21 1736)       Diagnostic Studies  Results Reviewed     Procedure Component Value Units Date/Time    Manual Differential (Non Wam) [[id number]]  (Abnormal) Collected: 01/04/21 1715    Lab Status: Final result Specimen: Blood from Arm, Right Updated: 01/04/21 1833     Segmented % 76 %      Bands % 4 %      Lymphocytes % 10 %      Monocytes % 10 %      Neutrophils Absolute 2 16 Thousand/uL      Lymphocytes Absolute 0 27 Thousand/uL      Monocytes Absolute 0 27 Thousand/uL      Total Counted 100     RBC Morphology abnormal     Ovalocytes Present     Polychromasia Present     Platelet Estimate Decreased     Giant PLTs Present     Tear Drop Cells Present    Comprehensive metabolic panel [[id number]]  (Abnormal) Collected: 01/04/21 1715    Lab Status: Final result Specimen: Blood from Arm, Right Updated: 01/04/21 1747     Sodium 130 mmol/L      Potassium 4 4 mmol/L      Chloride 93 mmol/L      CO2 24 mmol/L      ANION GAP 13 mmol/L      BUN 23 mg/dL      Creatinine 0 96 mg/dL      Glucose 296 mg/dL      Calcium 8 7 mg/dL      AST 55 U/L      ALT 27 U/L      Alkaline Phosphatase 62 U/L      Total Protein 7 8 g/dL      Albumin 3 6 g/dL      Total Bilirubin 0 70 mg/dL      eGFR 84 ml/min/1 73sq m     Narrative:      [place] guidelines for Chronic Kidney Disease (CKD):     Stage 1 with normal or high GFR (GFR > 90 mL/min/1 73 square meters)    Stage 2 Mild CKD (GFR = 60-89 mL/min/1 73 square meters)    Stage 3A Moderate CKD (GFR = 45-59 mL/min/1 73 square meters)    Stage 3B Moderate CKD (GFR = 30-44 mL/min/1 73 square meters)    Stage 4 Severe CKD (GFR = 15-29 mL/min/1 73 square meters)    Stage 5 End Stage CKD (GFR <15 mL/min/1 73 square meters)  Note: GFR calculation is accurate only with a steady state creatinine    Lactic acid [[id number]]  (Normal) Collected: 01/04/21 1715    Lab Status: Final result Specimen: Blood from Arm, Right Updated: 01/04/21 1746     LACTIC ACID 1 1 mmol/L     Narrative:      Result may be elevated if tourniquet was used during collection  CBC and differential [[id number]]  (Abnormal) Collected: 01/04/21 1715    Lab Status: Final result Specimen: Blood from Arm, Right Updated: 01/04/21 1734     WBC 2 70 Thousand/uL      RBC 5 02 Million/uL      Hemoglobin 14 7 g/dL      Hematocrit 43 4 %      MCV 86 fL      MCH 29 2 pg      MCHC 33 8 g/dL      RDW 13 4 %      MPV 10 3 fL      Platelets 75 Thousands/uL                  No orders to display              Procedures  Procedures         ED Course  ED Course as of Jan 04 2159   Mon Jan 04, 2021   1836 Patient re-evaluated  Patient notes that he feels improved  We are currently awaiting IV fluids to finished infusing  SBIRT 20yo+      Most Recent Value   SBIRT (22 yo +)   In order to provide better care to our patients, we are screening all of our patients for alcohol and drug use  Would it be okay to ask you these screening questions? No Filed at: 01/04/2021 1736   Initial Alcohol Screen: US AUDIT-C    1  How often do you have a drink containing alcohol?  0 Filed at: 01/04/2021 1736   3a  Male UNDER 65: How often do you have five or more drinks on one occasion? 0 Filed at: 01/04/2021 1736   3b  FEMALE Any Age, or MALE 65+: How often do you have 4 or more drinks on one occassion? 0 Filed at: 01/04/2021 1736   Audit-C Score  0 Filed at: 01/04/2021 1736   ZACK: How many times in the past year have you    Used an illegal drug or used a prescription medication for non-medical reasons?   Never Filed at: 01/04/2021 1736                    MDM    Disposition  Final diagnoses:   Mild dehydration   Nausea & vomiting   COVID-19     Time reflects when diagnosis was documented in both MDM as applicable and the Disposition within this note     Time User Action Codes Description Comment    1/4/2021  7:24 PM Brutico, Duong Slate Add [E86 0] Mild dehydration     1/4/2021  7:24 PM Brutico, Duong Slate Add [R11 2] Nausea & vomiting     1/4/2021  7:24 PM Brutico, Duong Slate Add [U07 1] COVID-19       ED Disposition     ED Disposition Condition Date/Time Comment    Discharge Stable Mon Jan 4, 2021  7:24 PM Theora Rede discharge to home/self care  Follow-up Information    None         Patient's Medications   Discharge Prescriptions    No medications on file     No discharge procedures on file      PDMP Review     None          ED Provider  Electronically Signed by           Christina Buck DO  01/04/21 4051

## 2021-01-04 NOTE — ED PROVIDER NOTES
History  Chief Complaint   Patient presents with    Vomiting     vomited 3 times since this am  Positive COVID friday       57 yo M  Here for N/v    Pt is COVID19 (+)  Pt has had flu symptoms for 6 days, he is overall stable and improving  He states that when he was here 3 days ago he had iv Zofran which worked so well  He has ODT zofran, but this doesn't work as well so he came in for IV Zofran    He has otherwise stable symptoms    No abdominal pan  No diarrhea    No other complaints          Vomiting  Severity:  Mild  Able to tolerate:  Liquids  Progression:  Improving  Chronicity:  New  Relieved by:  Nothing  Worsened by:  Nothing  Associated symptoms: chills, cough and fever    Associated symptoms: no abdominal pain, no arthralgias, no diarrhea, no headaches and no myalgias        Prior to Admission Medications   Prescriptions Last Dose Informant Patient Reported? Taking?    Blood Glucose Monitoring Suppl (Keyonna Contreras) w/Device KIT  Self No No   Sig: Use to test blood sugars 3 times daily   Empagliflozin (Jardiance) 25 MG TABS   No No   Sig: Take 1 tablet (25 mg total) by mouth every morning   Insulin Pen Needle 31G X 5 MM MISC  Self No No   Sig: by Does not apply route daily Pt to inject 4 X daily   Insulin Pen Needle 31G X 5 MM MISC  Self No No   Si units sq 2X daily   ergocalciferol (VITAMIN D2) 50,000 units   No No   Sig: Take 1 capsule (50,000 Units total) by mouth once a week   erythromycin (ILOTYCIN) ophthalmic ointment  Self Yes No   Si 5 inches daily at bedtime   furosemide (LASIX) 40 mg tablet  Self No No   Sig: Take 1 tablet (40 mg total) by mouth daily   Patient taking differently: Take 40 mg by mouth as needed (on off work days)    gabapentin (NEURONTIN) 100 mg capsule   No No   Sig: Take 1 capsule (100 mg total) by mouth 3 (three) times a day   glucose blood (OneTouch Verio) test strip   No No   Sig: Use to test blood sugars 3 times daily   insulin NPH-insulin regular (NovoLIN 70/30) 100 units/mL subcutaneous injection   No No   Sig: Inject 40 Units under the skin 2 (two) times a day before meals   Patient taking differently: Inject 40 Units under the skin 2 (two) times a day before meals Pt states he takes it "once in awhile"   insulin glargine (LANTUS) 100 units/mL subcutaneous injection   No No   Sig: Inject 10 Units under the skin daily at bedtime   Patient taking differently: Inject 20 Units under the skin daily at bedtime    isosorbide mononitrate (IMDUR) 120 mg 24 hr tablet   No No   Sig: Take 1 tablet (120 mg total) by mouth daily   lisinopril-hydrochlorothiazide (PRINZIDE,ZESTORETIC) 20-25 MG per tablet   No No   Sig: Take 1 tablet by mouth daily   methadone (DOLOPHINE) 10 MG/5ML solution  Self Yes No   Sig: Take 70 mg by mouth   omeprazole (PriLOSEC) 20 mg delayed release capsule   No No   Sig: Take 1 capsule (20 mg total) by mouth daily   ondansetron (ZOFRAN-ODT) 4 mg disintegrating tablet   No No   Sig: Take 1 tablet (4 mg total) by mouth every 6 (six) hours as needed for nausea or vomiting   ondansetron (ZOFRAN-ODT) 4 mg disintegrating tablet   No No   Sig: Take 1 tablet (4 mg total) by mouth every 6 (six) hours as needed for nausea or vomiting   potassium chloride (K-DUR,KLOR-CON) 20 mEq tablet  Self No No   Sig: Take 2 tablets (40 mEq total) by mouth daily   pravastatin (PRAVACHOL) 40 mg tablet   No No   Sig: Take 1 tablet (40 mg total) by mouth daily   promethazine (PHENERGAN) 25 mg tablet   No No   Sig: TAKE 1 TABLET TWICE A DAY   sitaGLIPtin-metFORMIN (JANUMET)  MG per tablet   No No   Sig: Take 1 tablet by mouth 2 (two) times a day with meals      Facility-Administered Medications: None       Past Medical History:   Diagnosis Date    Diabetes mellitus (HCC)     GERD (gastroesophageal reflux disease)     Hypercholesteremia     Hypertension     Methadone use ([facility] [address] )        History reviewed  No pertinent surgical history      Family History   Problem Relation Age of Onset    Diabetes Mother     Hypertension Father     Leukemia Father     Acute lymphoblastic leukemia Father     Cancer Sister      I have reviewed and agree with the history as documented  E-Cigarette/Vaping    E-Cigarette Use Never User      E-Cigarette/Vaping Substances    Nicotine No     THC No     CBD No     Flavoring No     Other No     Unknown No      Social History     Tobacco Use    Smoking status: Former Smoker    Smokeless tobacco: Never Used   Substance Use Topics    Alcohol use: No    Drug use: No     Comment: methadone       Review of Systems   Constitutional: Positive for chills and fever  Negative for diaphoresis and fatigue  Respiratory: Positive for cough  Negative for shortness of breath, wheezing and stridor  Cardiovascular: Negative for chest pain, palpitations and leg swelling  Gastrointestinal: Positive for nausea and vomiting  Negative for abdominal pain, blood in stool and diarrhea  Genitourinary: Negative for difficulty urinating, dysuria, flank pain and frequency  Musculoskeletal: Negative for arthralgias, back pain, gait problem, joint swelling, myalgias, neck pain and neck stiffness  Skin: Negative for rash and wound  Neurological: Negative for dizziness, light-headedness and headaches  All other systems reviewed and are negative  Physical Exam  Physical Exam  Constitutional:       General: He is not in acute distress  Appearance: He is well-developed  He is not ill-appearing, toxic-appearing or diaphoretic  HENT:      Head: Normocephalic and atraumatic  Nose: Nose normal       Mouth/Throat:      Pharynx: No oropharyngeal exudate  Eyes:      General: No scleral icterus  Right eye: No discharge  Left eye: No discharge  Conjunctiva/sclera: Conjunctivae normal       Pupils: Pupils are equal, round, and reactive to light  Neck:      Musculoskeletal: Normal range of motion and neck supple   No neck rigidity or muscular tenderness  Vascular: No JVD  Trachea: No tracheal deviation  Cardiovascular:      Rate and Rhythm: Normal rate and regular rhythm  Comments: Full Exam deferred on initial exam due to use of PAPR  Pulmonary:      Effort: Pulmonary effort is normal  No respiratory distress  Breath sounds: No stridor  Comments: Full Exam deferred on initial exam due to use of PAPR  Abdominal:      General: There is no distension  Palpations: Abdomen is soft  Tenderness: There is no abdominal tenderness  There is no guarding  Comments: Full Exam deferred on initial exam due to use of PAPR   Musculoskeletal: Normal range of motion  General: No swelling, tenderness, deformity or signs of injury  Right lower leg: No edema  Left lower leg: No edema  Lymphadenopathy:      Cervical: No cervical adenopathy  Skin:     General: Skin is warm  Capillary Refill: Capillary refill takes less than 2 seconds  Coloration: Skin is not jaundiced or pale  Findings: No bruising, erythema, lesion or rash  Neurological:      General: No focal deficit present  Mental Status: He is alert and oriented to person, place, and time  Mental status is at baseline  Cranial Nerves: No cranial nerve deficit  Sensory: No sensory deficit  Motor: No weakness or abnormal muscle tone  Coordination: Coordination normal       Gait: Gait normal    Psychiatric:         Mood and Affect: Mood normal          Behavior: Behavior normal          Thought Content:  Thought content normal          Judgment: Judgment normal          Vital Signs  ED Triage Vitals [01/03/21 1931]   Temperature Pulse Respirations Blood Pressure SpO2   97 6 °F (36 4 °C) 75 18 135/74 99 %      Temp src Heart Rate Source Patient Position - Orthostatic VS BP Location FiO2 (%)   -- -- -- -- --      Pain Score       --           Vitals:    01/03/21 1931 01/04/21 0022   BP: 135/74 129/77   Pulse: 75 68 Visual Acuity      ED Medications  Medications   sodium chloride 0 9 % bolus 1,000 mL (0 mL Intravenous Stopped 1/3/21 2100)   ondansetron (ZOFRAN) injection 4 mg (4 mg Intravenous Given 1/3/21 2000)       Diagnostic Studies  Results Reviewed     Procedure Component Value Units Date/Time    Fingerstick Glucose (POCT) [[id number]]  (Abnormal) Collected: 01/03/21 2347    Lab Status: Final result Updated: 01/03/21 2349     POC Glucose 273 mg/dl     Comprehensive metabolic panel [[id number]]  (Abnormal) Collected: 01/03/21 2035    Lab Status: Final result Specimen: Blood from Hand, Right Updated: 01/03/21 2100     Sodium 129 mmol/L      Potassium 4 3 mmol/L      Chloride 94 mmol/L      CO2 26 mmol/L      ANION GAP 9 mmol/L      BUN 26 mg/dL      Creatinine 0 93 mg/dL      Glucose 302 mg/dL      Calcium 8 0 mg/dL      Corrected Calcium 8 6 mg/dL      AST 38 U/L      ALT 21 U/L      Alkaline Phosphatase 55 U/L      Total Protein 6 9 g/dL      Albumin 3 3 g/dL      Total Bilirubin 0 60 mg/dL      eGFR 88 ml/min/1 73sq m     Narrative:      [place] guidelines for Chronic Kidney Disease (CKD):     Stage 1 with normal or high GFR (GFR > 90 mL/min/1 73 square meters)    Stage 2 Mild CKD (GFR = 60-89 mL/min/1 73 square meters)    Stage 3A Moderate CKD (GFR = 45-59 mL/min/1 73 square meters)    Stage 3B Moderate CKD (GFR = 30-44 mL/min/1 73 square meters)    Stage 4 Severe CKD (GFR = 15-29 mL/min/1 73 square meters)    Stage 5 End Stage CKD (GFR <15 mL/min/1 73 square meters)  Note: GFR calculation is accurate only with a steady state creatinine    Protime-INR [[id number]]  (Normal) Collected: 01/03/21 2002    Lab Status: Final result Specimen: Blood from Arm, Left Updated: 01/03/21 2027     Protime 14 1 seconds      INR 1 10    APTT [[id number]]  (Abnormal) Collected: 01/03/21 2002    Lab Status: Final result Specimen: Blood from Arm, Left Updated: 01/03/21 2027     PTT 41 seconds     Manual Differential (Non Wam) [[id number]]  (Abnormal) Collected: 01/03/21 2002    Lab Status: Final result Specimen: Blood from Arm, Left Updated: 01/03/21 2021     Segmented % 74 %      Lymphocytes % 18 %      Monocytes % 8 %      Neutrophils Absolute 2 00 Thousand/uL      Lymphocytes Absolute 0 49 Thousand/uL      Monocytes Absolute 0 22 Thousand/uL      Total Counted 100     RBC Morphology Normal     Platelet Estimate Decreased    CBC and differential [[id number]]  (Abnormal) Collected: 01/03/21 2002    Lab Status: Final result Specimen: Blood from Arm, Left Updated: 01/03/21 2019     WBC 2 70 Thousand/uL      RBC 4 98 Million/uL      Hemoglobin 14 5 g/dL      Hematocrit 42 9 %      MCV 86 fL      MCH 29 1 pg      MCHC 33 8 g/dL      RDW 13 2 %      MPV 10 1 fL      Platelets 79 Thousands/uL                  XR chest 1 view portable   ED Interpretation by Ashu Saini MD (01/03 2225)   COVID Pneumonia                  Procedures  Procedures         ED Course  ED Course as of Jan 04 0159   Ez Number Jan 03, 2021   1945 Pt stable appearing  Here for n/v  Appears non toxic, vss and wnl       2107 Comprehensive metabolic panel(!)   4342 PTT(!): 41   2107 CBC and differential(!)   2107 INR: 1 10   2107 Manual Differential (Non Wam)(!)   2110 Labs reviewed  Hyponatremia that corrects to 132      2225 Cxr c/w covid pna        Mon Jan 04, 2021   0000 Pt stable  BS improved w/ IVF  Will dc home  Pt happy w/ his ED care  He will return if worse                                               MDM    Disposition  Final diagnoses:   Nausea & vomiting     Time reflects when diagnosis was documented in both MDM as applicable and the Disposition within this note     Time User Action Codes Description Comment    1/3/2021  9:08 PM Jamie Ely Add [R11 2] Nausea & vomiting     1/3/2021  9:08 PM Jamie Ely Add [E87 1] Hyponatremia     1/3/2021 11:43 PM Jamie Ely Remove [E87 1] Hyponatremia     1/4/2021 12:11 AM Manfred Patricio [U07 1,  J12 82] Pneumonia due to COVID-19 virus       ED Disposition     ED Disposition Condition Date/Time Comment    Discharge Stable Sun Bradley 3, 2021 11:43 PM Gavin Russell discharge to home/self care  Follow-up Information    None         Discharge Medication List as of 1/4/2021 12:11 AM      CONTINUE these medications which have CHANGED    Details   ondansetron (ZOFRAN-ODT) 4 mg disintegrating tablet Take 1 tablet (4 mg total) by mouth every 6 (six) hours as needed for nausea or vomiting, Starting Mon 1/4/2021, Normal         CONTINUE these medications which have NOT CHANGED    Details   Blood Glucose Monitoring Suppl (Isaias Talbot) w/Device KIT Use to test blood sugars 3 times daily, Normal      Empagliflozin (Jardiance) 25 MG TABS Take 1 tablet (25 mg total) by mouth every morning, Starting Tue 6/9/2020, Normal      ergocalciferol (VITAMIN D2) 50,000 units Take 1 capsule (50,000 Units total) by mouth once a week, Starting Thu 7/9/2020, Normal      erythromycin (ILOTYCIN) ophthalmic ointment 0 5 inches daily at bedtime, Historical Med      furosemide (LASIX) 40 mg tablet Take 1 tablet (40 mg total) by mouth daily, Starting Tue 5/28/2019, Normal      gabapentin (NEURONTIN) 100 mg capsule Take 1 capsule (100 mg total) by mouth 3 (three) times a day, Starting Mon 6/22/2020, Normal      glucose blood (OneTouch Verio) test strip Use to test blood sugars 3 times daily, Normal      insulin glargine (LANTUS) 100 units/mL subcutaneous injection Inject 10 Units under the skin daily at bedtime, Starting Fri 12/11/2020, Normal      insulin NPH-insulin regular (NovoLIN 70/30) 100 units/mL subcutaneous injection Inject 40 Units under the skin 2 (two) times a day before meals, Starting Mon 7/13/2020, Until Thu 12/31/2020, Normal      !! Insulin Pen Needle 31G X 5 MM MISC by Does not apply route daily Pt to inject 4 X daily, Starting Thu 10/11/2018, Normal      !!  Insulin Pen Needle 31G X 5 MM MISC 30 units sq 2X daily, Normal      isosorbide mononitrate (IMDUR) 120 mg 24 hr tablet Take 1 tablet (120 mg total) by mouth daily, Starting Fri 6/5/2020, Normal      lisinopril-hydrochlorothiazide (PRINZIDE,ZESTORETIC) 20-25 MG per tablet Take 1 tablet by mouth daily, Starting Fri 6/5/2020, Normal      methadone (DOLOPHINE) 10 MG/5ML solution Take 70 mg by mouth, Historical Med      omeprazole (PriLOSEC) 20 mg delayed release capsule Take 1 capsule (20 mg total) by mouth daily, Starting Fri 6/5/2020, Normal      potassium chloride (K-DUR,KLOR-CON) 20 mEq tablet Take 2 tablets (40 mEq total) by mouth daily, Starting Tue 5/28/2019, Normal      pravastatin (PRAVACHOL) 40 mg tablet Take 1 tablet (40 mg total) by mouth daily, Starting Mon 6/22/2020, Normal      promethazine (PHENERGAN) 25 mg tablet TAKE 1 TABLET TWICE A DAY, Normal      sitaGLIPtin-metFORMIN (JANUMET)  MG per tablet Take 1 tablet by mouth 2 (two) times a day with meals, Starting Thu 7/9/2020, Normal       !! - Potential duplicate medications found  Please discuss with provider  No discharge procedures on file      PDMP Review     None          ED Provider  Electronically Signed by           Manolo Way MD  01/04/21 0464

## 2021-01-05 LAB
ATRIAL RATE: 69 BPM
BACTERIA BLD CULT: NORMAL
BACTERIA BLD CULT: NORMAL
P AXIS: 66 DEGREES
PR INTERVAL: 144 MS
QRS AXIS: 64 DEGREES
QRSD INTERVAL: 74 MS
QT INTERVAL: 416 MS
QTC INTERVAL: 445 MS
T WAVE AXIS: 64 DEGREES
VENTRICULAR RATE: 69 BPM

## 2021-01-05 PROCEDURE — 93010 ELECTROCARDIOGRAM REPORT: CPT | Performed by: INTERNAL MEDICINE

## 2021-01-05 NOTE — DISCHARGE INSTRUCTIONS
Drink small amounts frequently instead of large amounts occasionally  Consider a boost shake or other meals shake for food over the next 1 or 2 days  Otherwise drink liquids the same way-small amounts frequently and large amounts infrequently  Recheck with your doctor in 2-4 days, return to the ER with any new, concerning, or worsening issues

## 2021-01-06 ENCOUNTER — HOSPITAL ENCOUNTER (EMERGENCY)
Facility: HOSPITAL | Age: 61
Discharge: HOME | End: 2021-01-06
Attending: EMERGENCY MEDICINE | Admitting: EMERGENCY MEDICINE
Payer: COMMERCIAL

## 2021-01-06 ENCOUNTER — APPOINTMENT (EMERGENCY)
Dept: RADIOLOGY | Facility: HOSPITAL | Age: 61
End: 2021-01-06
Payer: COMMERCIAL

## 2021-01-06 VITALS
BODY MASS INDEX: 27.13 KG/M2 | HEART RATE: 73 BPM | HEIGHT: 68 IN | SYSTOLIC BLOOD PRESSURE: 162 MMHG | WEIGHT: 179 LBS | TEMPERATURE: 97.7 F | DIASTOLIC BLOOD PRESSURE: 80 MMHG | RESPIRATION RATE: 18 BRPM | OXYGEN SATURATION: 95 %

## 2021-01-06 DIAGNOSIS — R11.2 NAUSEA VOMITING AND DIARRHEA: Primary | ICD-10-CM

## 2021-01-06 DIAGNOSIS — R19.7 NAUSEA VOMITING AND DIARRHEA: Primary | ICD-10-CM

## 2021-01-06 DIAGNOSIS — J12.9 VIRAL PNEUMONIA: ICD-10-CM

## 2021-01-06 DIAGNOSIS — U07.1 COVID-19 VIRUS INFECTION: ICD-10-CM

## 2021-01-06 LAB
ALBUMIN SERPL BCP-MCNC: 3.5 G/DL (ref 3.5–5.7)
ALP SERPL-CCNC: 71 U/L (ref 55–165)
ALT SERPL W P-5'-P-CCNC: 27 U/L (ref 7–52)
ANION GAP SERPL CALCULATED.3IONS-SCNC: 16 MMOL/L (ref 4–13)
ANISOCYTOSIS BLD QL SMEAR: PRESENT
AST SERPL W P-5'-P-CCNC: 58 U/L (ref 13–39)
BILIRUB SERPL-MCNC: 0.8 MG/DL (ref 0.2–1)
BUN SERPL-MCNC: 23 MG/DL (ref 7–25)
CALCIUM SERPL-MCNC: 8.9 MG/DL (ref 8.6–10.5)
CHLORIDE SERPL-SCNC: 94 MMOL/L (ref 98–107)
CO2 SERPL-SCNC: 22 MMOL/L (ref 21–31)
CREAT SERPL-MCNC: 0.96 MG/DL (ref 0.7–1.3)
ERYTHROCYTE [DISTWIDTH] IN BLOOD BY AUTOMATED COUNT: 13.5 % (ref 11.5–14.5)
GFR SERPL CREATININE-BSD FRML MDRD: 84 ML/MIN/1.73SQ M
GLUCOSE SERPL-MCNC: 279 MG/DL (ref 65–99)
HCT VFR BLD AUTO: 42.7 % (ref 42–47)
HGB BLD-MCNC: 14.5 G/DL (ref 14–18)
LG PLATELETS BLD QL SMEAR: PRESENT
LYMPHOCYTES # BLD AUTO: 0.33 THOUSAND/UL (ref 0.6–4.47)
LYMPHOCYTES # BLD AUTO: 11 % (ref 20–51)
MAGNESIUM SERPL-MCNC: 1.9 MG/DL (ref 1.9–2.7)
MCH RBC QN AUTO: 29.3 PG (ref 26–34)
MCHC RBC AUTO-ENTMCNC: 34 G/DL (ref 31–37)
MCV RBC AUTO: 86 FL (ref 81–99)
MONOCYTES # BLD AUTO: 0.12 THOUSAND/UL (ref 0–1.22)
MONOCYTES NFR BLD AUTO: 4 % (ref 4–12)
NEUTS SEG # BLD: 2.55 THOUSAND/UL (ref 1.81–6.82)
NEUTS SEG NFR BLD AUTO: 85 % (ref 42–75)
PLATELET # BLD AUTO: 79 THOUSANDS/UL (ref 149–390)
PLATELET BLD QL SMEAR: ABNORMAL
PMV BLD AUTO: 9.5 FL (ref 8.6–11.7)
POTASSIUM SERPL-SCNC: 5.1 MMOL/L (ref 3.5–5.5)
PROT SERPL-MCNC: 7.7 G/DL (ref 6.4–8.9)
RBC # BLD AUTO: 4.95 MILLION/UL (ref 4.3–5.9)
RBC MORPH BLD: ABNORMAL
SODIUM SERPL-SCNC: 132 MMOL/L (ref 134–143)
TOTAL CELLS COUNTED SPEC: 100
TROPONIN I SERPL-MCNC: <0.03 NG/ML
WBC # BLD AUTO: 3 THOUSAND/UL (ref 4.8–10.8)

## 2021-01-06 PROCEDURE — 36415 COLL VENOUS BLD VENIPUNCTURE: CPT | Performed by: EMERGENCY MEDICINE

## 2021-01-06 PROCEDURE — 84484 ASSAY OF TROPONIN QUANT: CPT | Performed by: EMERGENCY MEDICINE

## 2021-01-06 PROCEDURE — 99284 EMERGENCY DEPT VISIT MOD MDM: CPT

## 2021-01-06 PROCEDURE — 85027 COMPLETE CBC AUTOMATED: CPT | Performed by: EMERGENCY MEDICINE

## 2021-01-06 PROCEDURE — 80053 COMPREHEN METABOLIC PANEL: CPT | Performed by: EMERGENCY MEDICINE

## 2021-01-06 PROCEDURE — 99285 EMERGENCY DEPT VISIT HI MDM: CPT | Performed by: EMERGENCY MEDICINE

## 2021-01-06 PROCEDURE — 96361 HYDRATE IV INFUSION ADD-ON: CPT

## 2021-01-06 PROCEDURE — 83735 ASSAY OF MAGNESIUM: CPT | Performed by: EMERGENCY MEDICINE

## 2021-01-06 PROCEDURE — 71045 X-RAY EXAM CHEST 1 VIEW: CPT

## 2021-01-06 PROCEDURE — 85007 BL SMEAR W/DIFF WBC COUNT: CPT | Performed by: EMERGENCY MEDICINE

## 2021-01-06 PROCEDURE — 93005 ELECTROCARDIOGRAM TRACING: CPT

## 2021-01-06 PROCEDURE — 96374 THER/PROPH/DIAG INJ IV PUSH: CPT

## 2021-01-06 RX ORDER — ONDANSETRON 2 MG/ML
4 INJECTION INTRAMUSCULAR; INTRAVENOUS ONCE
Status: COMPLETED | OUTPATIENT
Start: 2021-01-06 | End: 2021-01-06

## 2021-01-06 RX ADMIN — SODIUM CHLORIDE 1000 ML: 0.9 INJECTION, SOLUTION INTRAVENOUS at 06:06

## 2021-01-06 RX ADMIN — ONDANSETRON 4 MG: 2 INJECTION INTRAMUSCULAR; INTRAVENOUS at 06:06

## 2021-01-06 NOTE — DISCHARGE INSTRUCTIONS
RETURN IF WORSE IN ANY WAY:   CHEST PAIN, SHORTNESS OF BREATH, INCREASED PAIN, FEVER OR FLU LIKE SYMPTOMS, OR NEW AND CONCERNING SYMPTOMS SIGNS OR SYMPTOMS    PLEASE CALL YOUR PRIMARY DOCTOR IN THE MORNING TO SET UP FOLLOW UP   PLEASE REVIEW THE WORK UP RESULTS WITH YOUR DOCTOR

## 2021-01-06 NOTE — ED PROCEDURE NOTE
PROCEDURE  ECG 12 Lead Documentation Only    Date/Time: 1/6/2021 5:54 AM  Performed by: Esvin Torres MD  Authorized by: Esvin Torres MD     Indications / Diagnosis:  Sob   ECG reviewed by me, the ED Provider: yes    Patient location:  ED  Previous ECG:     Comparison to cardiac monitor: Yes    Interpretation:     Interpretation: non-specific    Rate:     ECG rate:  69    ECG rate assessment: normal    Rhythm:     Rhythm: sinus rhythm    Ectopy:     Ectopy: none    QRS:     QRS axis:  Normal    QRS intervals:  Normal  Conduction:     Conduction: normal    ST segments:     ST segments:  Non-specific  T waves:     T waves: non-specific           Esvin Torres MD  01/06/21 0741

## 2021-01-07 LAB
ATRIAL RATE: 69 BPM
P AXIS: 38 DEGREES
PR INTERVAL: 126 MS
QRS AXIS: 69 DEGREES
QRSD INTERVAL: 74 MS
QT INTERVAL: 426 MS
QTC INTERVAL: 456 MS
T WAVE AXIS: 49 DEGREES
VENTRICULAR RATE: 69 BPM

## 2021-01-07 PROCEDURE — 93010 ELECTROCARDIOGRAM REPORT: CPT | Performed by: INTERNAL MEDICINE

## 2021-01-11 ENCOUNTER — APPOINTMENT (INPATIENT)
Dept: CT IMAGING | Facility: HOSPITAL | Age: 61
DRG: 871 | End: 2021-01-11
Payer: COMMERCIAL

## 2021-01-11 ENCOUNTER — APPOINTMENT (EMERGENCY)
Dept: RADIOLOGY | Facility: HOSPITAL | Age: 61
DRG: 177 | End: 2021-01-11
Payer: COMMERCIAL

## 2021-01-11 ENCOUNTER — HOSPITAL ENCOUNTER (INPATIENT)
Facility: HOSPITAL | Age: 61
LOS: 1 days | DRG: 177 | End: 2021-01-11
Attending: FAMILY MEDICINE | Admitting: INTERNAL MEDICINE
Payer: COMMERCIAL

## 2021-01-11 ENCOUNTER — HOSPITAL ENCOUNTER (INPATIENT)
Facility: HOSPITAL | Age: 61
LOS: 2 days | DRG: 871 | End: 2021-01-13
Attending: INTERNAL MEDICINE | Admitting: INTERNAL MEDICINE
Payer: COMMERCIAL

## 2021-01-11 VITALS
DIASTOLIC BLOOD PRESSURE: 92 MMHG | HEART RATE: 101 BPM | SYSTOLIC BLOOD PRESSURE: 150 MMHG | OXYGEN SATURATION: 94 % | TEMPERATURE: 95.4 F | WEIGHT: 162.7 LBS | HEIGHT: 68 IN | BODY MASS INDEX: 24.66 KG/M2 | RESPIRATION RATE: 52 BRPM

## 2021-01-11 DIAGNOSIS — R33.9 URINARY RETENTION: ICD-10-CM

## 2021-01-11 DIAGNOSIS — R31.9 HEMATURIA: Primary | ICD-10-CM

## 2021-01-11 DIAGNOSIS — A41.89 SEPSIS DUE TO COVID-19 (HCC): ICD-10-CM

## 2021-01-11 DIAGNOSIS — R60.0 BILATERAL LOWER EXTREMITY EDEMA: ICD-10-CM

## 2021-01-11 DIAGNOSIS — D72.829 LEUKOCYTOSIS: ICD-10-CM

## 2021-01-11 DIAGNOSIS — E11.10 DKA (DIABETIC KETOACIDOSES): Primary | ICD-10-CM

## 2021-01-11 DIAGNOSIS — J96.01 ACUTE RESPIRATORY FAILURE WITH HYPOXIA (HCC): ICD-10-CM

## 2021-01-11 DIAGNOSIS — K92.2 GI BLEEDING: ICD-10-CM

## 2021-01-11 DIAGNOSIS — U07.1 SEPSIS DUE TO COVID-19 (HCC): ICD-10-CM

## 2021-01-11 DIAGNOSIS — R53.1 GENERALIZED WEAKNESS: ICD-10-CM

## 2021-01-11 DIAGNOSIS — E87.2 ACIDOSIS: ICD-10-CM

## 2021-01-11 DIAGNOSIS — I82.409 DVT (DEEP VENOUS THROMBOSIS) (HCC): ICD-10-CM

## 2021-01-11 DIAGNOSIS — R09.02 HYPOXIA: ICD-10-CM

## 2021-01-11 DIAGNOSIS — R00.0 TACHYCARDIA: ICD-10-CM

## 2021-01-11 PROBLEM — E87.0 HYPERNATREMIA: Status: ACTIVE | Noted: 2021-01-11

## 2021-01-11 PROBLEM — E87.29 METABOLIC ACIDOSIS, INCREASED ANION GAP: Status: ACTIVE | Noted: 2021-01-11

## 2021-01-11 PROBLEM — G93.41 ACUTE METABOLIC ENCEPHALOPATHY: Status: ACTIVE | Noted: 2021-01-11

## 2021-01-11 PROBLEM — N17.9 ACUTE KIDNEY INJURY (HCC): Status: ACTIVE | Noted: 2021-01-11

## 2021-01-11 PROBLEM — J12.82 PNEUMONIA DUE TO COVID-19 VIRUS: Status: ACTIVE | Noted: 2021-01-11

## 2021-01-11 PROBLEM — K85.90 ACUTE PANCREATITIS: Status: ACTIVE | Noted: 2021-01-11

## 2021-01-11 LAB
ABO GROUP BLD: NORMAL
ABO GROUP BLD: NORMAL
ALBUMIN SERPL BCP-MCNC: 2.9 G/DL (ref 3.5–5.7)
ALP SERPL-CCNC: 114 U/L (ref 55–165)
ALT SERPL W P-5'-P-CCNC: 25 U/L (ref 7–52)
ANION GAP SERPL CALCULATED.3IONS-SCNC: 17 MMOL/L (ref 4–13)
ANION GAP SERPL CALCULATED.3IONS-SCNC: 28 MMOL/L (ref 4–13)
ANION GAP SERPL CALCULATED.3IONS-SCNC: 29 MMOL/L (ref 4–13)
ANISOCYTOSIS BLD QL SMEAR: PRESENT
APTT PPP: 26 SECONDS (ref 23–37)
AST SERPL W P-5'-P-CCNC: 29 U/L (ref 13–39)
ATRIAL RATE: 109 BPM
BASE EX.OXY STD BLDV CALC-SCNC: 83.5 %
BASE EX.OXY STD BLDV CALC-SCNC: 94.9 %
BASE EXCESS BLDV CALC-SCNC: -13.1 MMOL/L
BASE EXCESS BLDV CALC-SCNC: -22.7 MMOL/L
BETA-HYDROXYBUTYRATE: 6.2 MMOL/L
BILIRUB DIRECT SERPL-MCNC: 0.4 MG/DL (ref 0–0.2)
BILIRUB SERPL-MCNC: 0.7 MG/DL (ref 0.2–1)
BLD GP AB SCN SERPL QL: NEGATIVE
BUN SERPL-MCNC: 59 MG/DL (ref 5–25)
BUN SERPL-MCNC: 64 MG/DL (ref 7–25)
BUN SERPL-MCNC: 65 MG/DL (ref 7–25)
CALCIUM SERPL-MCNC: 8.7 MG/DL (ref 8.3–10.1)
CALCIUM SERPL-MCNC: 9.2 MG/DL (ref 8.6–10.5)
CALCIUM SERPL-MCNC: 9.8 MG/DL (ref 8.6–10.5)
CHLORIDE SERPL-SCNC: 105 MMOL/L (ref 98–107)
CHLORIDE SERPL-SCNC: 111 MMOL/L (ref 98–107)
CHLORIDE SERPL-SCNC: 118 MMOL/L (ref 100–108)
CK MB SERPL-MCNC: 2.8 % (ref 0–2.5)
CK MB SERPL-MCNC: 6.2 NG/ML (ref 0–5)
CK SERPL-CCNC: 224 U/L (ref 39–308)
CO2 SERPL-SCNC: 10 MMOL/L (ref 21–31)
CO2 SERPL-SCNC: 10 MMOL/L (ref 21–31)
CO2 SERPL-SCNC: 19 MMOL/L (ref 21–32)
CREAT SERPL-MCNC: 1.43 MG/DL (ref 0.7–1.3)
CREAT SERPL-MCNC: 1.47 MG/DL (ref 0.7–1.3)
CREAT SERPL-MCNC: 1.51 MG/DL (ref 0.6–1.3)
CRP SERPL QL: 80 MG/L
D DIMER PPP FEU-MCNC: 8.3 UG/ML FEU
ERYTHROCYTE [DISTWIDTH] IN BLOOD BY AUTOMATED COUNT: 14.6 % (ref 11.5–14.5)
GFR SERPL CREATININE-BSD FRML MDRD: 49 ML/MIN/1.73SQ M
GFR SERPL CREATININE-BSD FRML MDRD: 50 ML/MIN/1.73SQ M
GFR SERPL CREATININE-BSD FRML MDRD: 52 ML/MIN/1.73SQ M
GLUCOSE SERPL-MCNC: 161 MG/DL (ref 65–140)
GLUCOSE SERPL-MCNC: 261 MG/DL (ref 65–140)
GLUCOSE SERPL-MCNC: 262 MG/DL (ref 65–140)
GLUCOSE SERPL-MCNC: 305 MG/DL (ref 65–140)
GLUCOSE SERPL-MCNC: 313 MG/DL (ref 65–140)
GLUCOSE SERPL-MCNC: 339 MG/DL (ref 65–140)
GLUCOSE SERPL-MCNC: 397 MG/DL (ref 65–140)
GLUCOSE SERPL-MCNC: 421 MG/DL (ref 65–140)
GLUCOSE SERPL-MCNC: 478 MG/DL (ref 65–140)
GLUCOSE SERPL-MCNC: 550 MG/DL (ref 65–99)
GLUCOSE SERPL-MCNC: 654 MG/DL (ref 65–99)
GLUCOSE SERPL-MCNC: >500 MG/DL (ref 65–140)
GLUCOSE SERPL-MCNC: >500 MG/DL (ref 65–140)
HCO3 BLDV-SCNC: 11.4 MMOL/L (ref 24–30)
HCO3 BLDV-SCNC: 6.9 MMOL/L (ref 24–30)
HCT VFR BLD AUTO: 48.3 % (ref 42–47)
HGB BLD-MCNC: 15.6 G/DL (ref 14–18)
HIV 1+2 AB+HIV1 P24 AG SERPL QL IA: NORMAL
HIV1 P24 AG SER QL: NORMAL
INR PPP: 1.38 (ref 0.84–1.19)
LACTATE SERPL-SCNC: 2.5 MMOL/L (ref 0.5–2)
LACTATE SERPL-SCNC: 2.8 MMOL/L (ref 0.5–2)
LACTATE SERPL-SCNC: 3.4 MMOL/L (ref 0.5–2)
LACTATE SERPL-SCNC: 4.9 MMOL/L (ref 0.5–2)
LG PLATELETS BLD QL SMEAR: PRESENT
LIPASE SERPL-CCNC: 539 U/L (ref 11–82)
LYMPHOCYTES # BLD AUTO: 1.21 THOUSAND/UL (ref 0.6–4.47)
LYMPHOCYTES # BLD AUTO: 6 % (ref 20–51)
MAGNESIUM SERPL-MCNC: 2.2 MG/DL (ref 1.6–2.6)
MAGNESIUM SERPL-MCNC: 2.5 MG/DL (ref 1.9–2.7)
MAGNESIUM SERPL-MCNC: 2.6 MG/DL (ref 1.9–2.7)
MCH RBC QN AUTO: 28.7 PG (ref 26–34)
MCHC RBC AUTO-ENTMCNC: 32.3 G/DL (ref 31–37)
MCV RBC AUTO: 89 FL (ref 81–99)
MONOCYTES # BLD AUTO: 1.41 THOUSAND/UL (ref 0–1.22)
MONOCYTES NFR BLD AUTO: 7 % (ref 4–12)
NEUTS SEG # BLD: 17.57 THOUSAND/UL (ref 1.81–6.82)
NEUTS SEG NFR BLD AUTO: 87 % (ref 42–75)
NT-PROBNP SERPL-MCNC: 448 PG/ML
O2 CT BLDV-SCNC: 19.5 ML/DL
O2 CT BLDV-SCNC: 19.9 ML/DL
P AXIS: 74 DEGREES
PCO2 BLDV: 22.8 MM HG
PCO2 BLDV: 23.2 MM HG
PH BLDV: 7.11 [PH] (ref 7.3–7.4)
PH BLDV: 7.31 [PH] (ref 7.3–7.4)
PHOSPHATE SERPL-MCNC: 0.8 MG/DL (ref 2.3–4.1)
PHOSPHATE SERPL-MCNC: 2.7 MG/DL (ref 3–5.5)
PLATELET # BLD AUTO: 237 THOUSANDS/UL (ref 149–390)
PLATELET BLD QL SMEAR: ADEQUATE
PMV BLD AUTO: 8.3 FL (ref 8.6–11.7)
PO2 BLDV: 70 MM HG (ref 35–45)
PO2 BLDV: 95 MM HG (ref 35–45)
POTASSIUM SERPL-SCNC: 3.3 MMOL/L (ref 3.5–5.5)
POTASSIUM SERPL-SCNC: 3.6 MMOL/L (ref 3.5–5.3)
POTASSIUM SERPL-SCNC: 3.8 MMOL/L (ref 3.5–5.5)
PR INTERVAL: 124 MS
PROCALCITONIN SERPL-MCNC: 0.31 NG/ML
PROT SERPL-MCNC: 6.8 G/DL (ref 6.4–8.9)
PROTHROMBIN TIME: 16.8 SECONDS (ref 11.6–14.5)
QRS AXIS: 63 DEGREES
QRSD INTERVAL: 74 MS
QT INTERVAL: 334 MS
QTC INTERVAL: 449 MS
RBC # BLD AUTO: 5.45 MILLION/UL (ref 4.3–5.9)
RBC MORPH BLD: ABNORMAL
RH BLD: POSITIVE
RH BLD: POSITIVE
SODIUM SERPL-SCNC: 143 MMOL/L (ref 134–143)
SODIUM SERPL-SCNC: 150 MMOL/L (ref 134–143)
SODIUM SERPL-SCNC: 154 MMOL/L (ref 136–145)
SPECIMEN EXPIRATION DATE: NORMAL
T WAVE AXIS: 90 DEGREES
TOTAL CELLS COUNTED SPEC: 100
TROPONIN I SERPL-MCNC: <0.03 NG/ML
VENTRICULAR RATE: 109 BPM
WBC # BLD AUTO: 20.2 THOUSAND/UL (ref 4.8–10.8)

## 2021-01-11 PROCEDURE — 93010 ELECTROCARDIOGRAM REPORT: CPT | Performed by: INTERNAL MEDICINE

## 2021-01-11 PROCEDURE — 80048 BASIC METABOLIC PNL TOTAL CA: CPT | Performed by: INTERNAL MEDICINE

## 2021-01-11 PROCEDURE — 85007 BL SMEAR W/DIFF WBC COUNT: CPT | Performed by: FAMILY MEDICINE

## 2021-01-11 PROCEDURE — 99223 1ST HOSP IP/OBS HIGH 75: CPT | Performed by: INTERNAL MEDICINE

## 2021-01-11 PROCEDURE — 80076 HEPATIC FUNCTION PANEL: CPT | Performed by: INTERNAL MEDICINE

## 2021-01-11 PROCEDURE — 86140 C-REACTIVE PROTEIN: CPT | Performed by: PHYSICIAN ASSISTANT

## 2021-01-11 PROCEDURE — 82948 REAGENT STRIP/BLOOD GLUCOSE: CPT

## 2021-01-11 PROCEDURE — 96375 TX/PRO/DX INJ NEW DRUG ADDON: CPT

## 2021-01-11 PROCEDURE — 94760 N-INVAS EAR/PLS OXIMETRY 1: CPT

## 2021-01-11 PROCEDURE — 94762 N-INVAS EAR/PLS OXIMTRY CONT: CPT

## 2021-01-11 PROCEDURE — 87081 CULTURE SCREEN ONLY: CPT | Performed by: PHYSICIAN ASSISTANT

## 2021-01-11 PROCEDURE — 86900 BLOOD TYPING SEROLOGIC ABO: CPT | Performed by: PHYSICIAN ASSISTANT

## 2021-01-11 PROCEDURE — 96361 HYDRATE IV INFUSION ADD-ON: CPT

## 2021-01-11 PROCEDURE — 84484 ASSAY OF TROPONIN QUANT: CPT | Performed by: FAMILY MEDICINE

## 2021-01-11 PROCEDURE — 96368 THER/DIAG CONCURRENT INF: CPT

## 2021-01-11 PROCEDURE — 70450 CT HEAD/BRAIN W/O DYE: CPT

## 2021-01-11 PROCEDURE — 86850 RBC ANTIBODY SCREEN: CPT | Performed by: PHYSICIAN ASSISTANT

## 2021-01-11 PROCEDURE — 83735 ASSAY OF MAGNESIUM: CPT | Performed by: FAMILY MEDICINE

## 2021-01-11 PROCEDURE — 80048 BASIC METABOLIC PNL TOTAL CA: CPT | Performed by: PHYSICIAN ASSISTANT

## 2021-01-11 PROCEDURE — 87040 BLOOD CULTURE FOR BACTERIA: CPT | Performed by: FAMILY MEDICINE

## 2021-01-11 PROCEDURE — 83605 ASSAY OF LACTIC ACID: CPT | Performed by: PHYSICIAN ASSISTANT

## 2021-01-11 PROCEDURE — 83880 ASSAY OF NATRIURETIC PEPTIDE: CPT | Performed by: PHYSICIAN ASSISTANT

## 2021-01-11 PROCEDURE — 71045 X-RAY EXAM CHEST 1 VIEW: CPT

## 2021-01-11 PROCEDURE — 80048 BASIC METABOLIC PNL TOTAL CA: CPT | Performed by: FAMILY MEDICINE

## 2021-01-11 PROCEDURE — 99291 CRITICAL CARE FIRST HOUR: CPT

## 2021-01-11 PROCEDURE — 93005 ELECTROCARDIOGRAM TRACING: CPT

## 2021-01-11 PROCEDURE — NC001 PR NO CHARGE: Performed by: PHYSICIAN ASSISTANT

## 2021-01-11 PROCEDURE — 96365 THER/PROPH/DIAG IV INF INIT: CPT

## 2021-01-11 PROCEDURE — 86803 HEPATITIS C AB TEST: CPT | Performed by: PHYSICIAN ASSISTANT

## 2021-01-11 PROCEDURE — 87040 BLOOD CULTURE FOR BACTERIA: CPT | Performed by: PHYSICIAN ASSISTANT

## 2021-01-11 PROCEDURE — 83735 ASSAY OF MAGNESIUM: CPT | Performed by: PHYSICIAN ASSISTANT

## 2021-01-11 PROCEDURE — 96376 TX/PRO/DX INJ SAME DRUG ADON: CPT

## 2021-01-11 PROCEDURE — 84100 ASSAY OF PHOSPHORUS: CPT | Performed by: PHYSICIAN ASSISTANT

## 2021-01-11 PROCEDURE — 86705 HEP B CORE ANTIBODY IGM: CPT | Performed by: PHYSICIAN ASSISTANT

## 2021-01-11 PROCEDURE — 99292 CRITICAL CARE ADDL 30 MIN: CPT | Performed by: FAMILY MEDICINE

## 2021-01-11 PROCEDURE — 82805 BLOOD GASES W/O2 SATURATION: CPT | Performed by: FAMILY MEDICINE

## 2021-01-11 PROCEDURE — 99291 CRITICAL CARE FIRST HOUR: CPT | Performed by: FAMILY MEDICINE

## 2021-01-11 PROCEDURE — 94002 VENT MGMT INPAT INIT DAY: CPT

## 2021-01-11 PROCEDURE — 83735 ASSAY OF MAGNESIUM: CPT | Performed by: INTERNAL MEDICINE

## 2021-01-11 PROCEDURE — 83690 ASSAY OF LIPASE: CPT | Performed by: INTERNAL MEDICINE

## 2021-01-11 PROCEDURE — 99292 CRITICAL CARE ADDL 30 MIN: CPT | Performed by: INTERNAL MEDICINE

## 2021-01-11 PROCEDURE — 82550 ASSAY OF CK (CPK): CPT | Performed by: PHYSICIAN ASSISTANT

## 2021-01-11 PROCEDURE — 82728 ASSAY OF FERRITIN: CPT | Performed by: PHYSICIAN ASSISTANT

## 2021-01-11 PROCEDURE — 85610 PROTHROMBIN TIME: CPT | Performed by: FAMILY MEDICINE

## 2021-01-11 PROCEDURE — 82553 CREATINE MB FRACTION: CPT | Performed by: PHYSICIAN ASSISTANT

## 2021-01-11 PROCEDURE — 87806 HIV AG W/HIV1&2 ANTB W/OPTIC: CPT | Performed by: PHYSICIAN ASSISTANT

## 2021-01-11 PROCEDURE — 84100 ASSAY OF PHOSPHORUS: CPT | Performed by: INTERNAL MEDICINE

## 2021-01-11 PROCEDURE — G1004 CDSM NDSC: HCPCS

## 2021-01-11 PROCEDURE — 82805 BLOOD GASES W/O2 SATURATION: CPT | Performed by: INTERNAL MEDICINE

## 2021-01-11 PROCEDURE — 82010 KETONE BODYS QUAN: CPT | Performed by: FAMILY MEDICINE

## 2021-01-11 PROCEDURE — 85379 FIBRIN DEGRADATION QUANT: CPT | Performed by: PHYSICIAN ASSISTANT

## 2021-01-11 PROCEDURE — 86704 HEP B CORE ANTIBODY TOTAL: CPT | Performed by: PHYSICIAN ASSISTANT

## 2021-01-11 PROCEDURE — 87340 HEPATITIS B SURFACE AG IA: CPT | Performed by: PHYSICIAN ASSISTANT

## 2021-01-11 PROCEDURE — 85730 THROMBOPLASTIN TIME PARTIAL: CPT | Performed by: FAMILY MEDICINE

## 2021-01-11 PROCEDURE — 36415 COLL VENOUS BLD VENIPUNCTURE: CPT | Performed by: FAMILY MEDICINE

## 2021-01-11 PROCEDURE — 83605 ASSAY OF LACTIC ACID: CPT | Performed by: FAMILY MEDICINE

## 2021-01-11 PROCEDURE — 85027 COMPLETE CBC AUTOMATED: CPT | Performed by: FAMILY MEDICINE

## 2021-01-11 PROCEDURE — 84145 PROCALCITONIN (PCT): CPT | Performed by: FAMILY MEDICINE

## 2021-01-11 PROCEDURE — 99291 CRITICAL CARE FIRST HOUR: CPT | Performed by: PHYSICIAN ASSISTANT

## 2021-01-11 PROCEDURE — 86901 BLOOD TYPING SEROLOGIC RH(D): CPT | Performed by: PHYSICIAN ASSISTANT

## 2021-01-11 RX ORDER — SODIUM CHLORIDE 9 MG/ML
3 INJECTION INTRAVENOUS
Status: DISCONTINUED | OUTPATIENT
Start: 2021-01-11 | End: 2021-01-13 | Stop reason: HOSPADM

## 2021-01-11 RX ORDER — SODIUM CHLORIDE 450 MG/100ML
250 INJECTION, SOLUTION INTRAVENOUS CONTINUOUS
Status: CANCELLED | OUTPATIENT
Start: 2021-01-11

## 2021-01-11 RX ORDER — MELATONIN
2000 DAILY
Status: DISCONTINUED | OUTPATIENT
Start: 2021-01-12 | End: 2021-01-13 | Stop reason: HOSPADM

## 2021-01-11 RX ORDER — SODIUM CHLORIDE 9 MG/ML
3 INJECTION INTRAVENOUS
Status: DISCONTINUED | OUTPATIENT
Start: 2021-01-11 | End: 2021-01-11 | Stop reason: HOSPADM

## 2021-01-11 RX ORDER — CEFEPIME HYDROCHLORIDE 2 G/50ML
2000 INJECTION, SOLUTION INTRAVENOUS EVERY 12 HOURS
Status: CANCELLED | OUTPATIENT
Start: 2021-01-12

## 2021-01-11 RX ORDER — DEXAMETHASONE SODIUM PHOSPHATE 4 MG/ML
6 INJECTION, SOLUTION INTRA-ARTICULAR; INTRALESIONAL; INTRAMUSCULAR; INTRAVENOUS; SOFT TISSUE DAILY
Status: DISCONTINUED | OUTPATIENT
Start: 2021-01-12 | End: 2021-01-12

## 2021-01-11 RX ORDER — DEXAMETHASONE SODIUM PHOSPHATE 4 MG/ML
6 INJECTION, SOLUTION INTRA-ARTICULAR; INTRALESIONAL; INTRAMUSCULAR; INTRAVENOUS; SOFT TISSUE DAILY
Status: CANCELLED | OUTPATIENT
Start: 2021-01-12

## 2021-01-11 RX ORDER — ZINC SULFATE 50(220)MG
220 CAPSULE ORAL DAILY
Status: DISCONTINUED | OUTPATIENT
Start: 2021-01-12 | End: 2021-01-13 | Stop reason: HOSPADM

## 2021-01-11 RX ORDER — HEPARIN SODIUM 5000 [USP'U]/ML
5000 INJECTION, SOLUTION INTRAVENOUS; SUBCUTANEOUS EVERY 8 HOURS SCHEDULED
Status: DISCONTINUED | OUTPATIENT
Start: 2021-01-11 | End: 2021-01-12

## 2021-01-11 RX ORDER — POTASSIUM CHLORIDE 20 MEQ/1
40 TABLET, EXTENDED RELEASE ORAL ONCE
Status: COMPLETED | OUTPATIENT
Start: 2021-01-11 | End: 2021-01-11

## 2021-01-11 RX ORDER — POTASSIUM CHLORIDE 14.9 MG/ML
20 INJECTION INTRAVENOUS
Status: DISCONTINUED | OUTPATIENT
Start: 2021-01-11 | End: 2021-01-11 | Stop reason: HOSPADM

## 2021-01-11 RX ORDER — ATORVASTATIN CALCIUM 40 MG/1
40 TABLET, FILM COATED ORAL
Status: DISCONTINUED | OUTPATIENT
Start: 2021-01-11 | End: 2021-01-13 | Stop reason: HOSPADM

## 2021-01-11 RX ORDER — VANCOMYCIN HYDROCHLORIDE 1 G/200ML
1000 INJECTION, SOLUTION INTRAVENOUS EVERY 12 HOURS
Status: CANCELLED | OUTPATIENT
Start: 2021-01-12

## 2021-01-11 RX ORDER — DEXTROSE AND SODIUM CHLORIDE 5; .9 G/100ML; G/100ML
250 INJECTION, SOLUTION INTRAVENOUS CONTINUOUS
Status: CANCELLED | OUTPATIENT
Start: 2021-01-11

## 2021-01-11 RX ORDER — CEFEPIME HYDROCHLORIDE 2 G/50ML
2000 INJECTION, SOLUTION INTRAVENOUS EVERY 12 HOURS
Status: DISCONTINUED | OUTPATIENT
Start: 2021-01-11 | End: 2021-01-11 | Stop reason: HOSPADM

## 2021-01-11 RX ORDER — ACETAMINOPHEN 325 MG/1
650 TABLET ORAL EVERY 6 HOURS PRN
Status: CANCELLED | OUTPATIENT
Start: 2021-01-11

## 2021-01-11 RX ORDER — ALBUTEROL SULFATE 2.5 MG/3ML
2.5 SOLUTION RESPIRATORY (INHALATION) ONCE
Status: COMPLETED | OUTPATIENT
Start: 2021-01-11 | End: 2021-01-11

## 2021-01-11 RX ORDER — FAMOTIDINE 20 MG/1
20 TABLET, FILM COATED ORAL 2 TIMES DAILY
Status: DISCONTINUED | OUTPATIENT
Start: 2021-01-11 | End: 2021-01-12

## 2021-01-11 RX ORDER — GABAPENTIN 100 MG/1
100 CAPSULE ORAL 3 TIMES DAILY
Status: CANCELLED | OUTPATIENT
Start: 2021-01-11

## 2021-01-11 RX ORDER — POTASSIUM CHLORIDE 14.9 MG/ML
20 INJECTION INTRAVENOUS
Status: DISPENSED | OUTPATIENT
Start: 2021-01-11 | End: 2021-01-11

## 2021-01-11 RX ORDER — SODIUM CHLORIDE, SODIUM GLUCONATE, SODIUM ACETATE, POTASSIUM CHLORIDE, MAGNESIUM CHLORIDE, SODIUM PHOSPHATE, DIBASIC, AND POTASSIUM PHOSPHATE .53; .5; .37; .037; .03; .012; .00082 G/100ML; G/100ML; G/100ML; G/100ML; G/100ML; G/100ML; G/100ML
150 INJECTION, SOLUTION INTRAVENOUS CONTINUOUS
Status: DISCONTINUED | OUTPATIENT
Start: 2021-01-11 | End: 2021-01-12

## 2021-01-11 RX ORDER — POTASSIUM CHLORIDE 14.9 MG/ML
20 INJECTION INTRAVENOUS
Status: CANCELLED | OUTPATIENT
Start: 2021-01-11 | End: 2021-01-11

## 2021-01-11 RX ORDER — SODIUM CHLORIDE 9 MG/ML
500 INJECTION, SOLUTION INTRAVENOUS CONTINUOUS
Status: DISCONTINUED | OUTPATIENT
Start: 2021-01-11 | End: 2021-01-11

## 2021-01-11 RX ORDER — MULTIVITAMIN/IRON/FOLIC ACID 18MG-0.4MG
1 TABLET ORAL DAILY
Status: DISCONTINUED | OUTPATIENT
Start: 2021-01-19 | End: 2021-01-13 | Stop reason: HOSPADM

## 2021-01-11 RX ORDER — SODIUM CHLORIDE 9 MG/ML
2000 INJECTION, SOLUTION INTRAVENOUS CONTINUOUS
Status: DISCONTINUED | OUTPATIENT
Start: 2021-01-11 | End: 2021-01-11

## 2021-01-11 RX ORDER — SODIUM CHLORIDE, SODIUM GLUCONATE, SODIUM ACETATE, POTASSIUM CHLORIDE, MAGNESIUM CHLORIDE, SODIUM PHOSPHATE, DIBASIC, AND POTASSIUM PHOSPHATE .53; .5; .37; .037; .03; .012; .00082 G/100ML; G/100ML; G/100ML; G/100ML; G/100ML; G/100ML; G/100ML
1000 INJECTION, SOLUTION INTRAVENOUS ONCE
Status: COMPLETED | OUTPATIENT
Start: 2021-01-11 | End: 2021-01-11

## 2021-01-11 RX ORDER — VANCOMYCIN HYDROCHLORIDE 1 G/200ML
1000 INJECTION, SOLUTION INTRAVENOUS EVERY 12 HOURS
Status: DISCONTINUED | OUTPATIENT
Start: 2021-01-11 | End: 2021-01-11 | Stop reason: HOSPADM

## 2021-01-11 RX ORDER — DEXAMETHASONE SODIUM PHOSPHATE 4 MG/ML
6 INJECTION, SOLUTION INTRA-ARTICULAR; INTRALESIONAL; INTRAMUSCULAR; INTRAVENOUS; SOFT TISSUE DAILY
Status: DISCONTINUED | OUTPATIENT
Start: 2021-01-12 | End: 2021-01-11 | Stop reason: HOSPADM

## 2021-01-11 RX ORDER — DEXTROSE AND SODIUM CHLORIDE 5; .9 G/100ML; G/100ML
250 INJECTION, SOLUTION INTRAVENOUS CONTINUOUS
Status: DISCONTINUED | OUTPATIENT
Start: 2021-01-11 | End: 2021-01-12

## 2021-01-11 RX ORDER — SODIUM CHLORIDE 9 MG/ML
250 INJECTION, SOLUTION INTRAVENOUS CONTINUOUS
Status: DISCONTINUED | OUTPATIENT
Start: 2021-01-11 | End: 2021-01-11

## 2021-01-11 RX ORDER — ONDANSETRON 2 MG/ML
4 INJECTION INTRAMUSCULAR; INTRAVENOUS EVERY 6 HOURS PRN
Status: CANCELLED | OUTPATIENT
Start: 2021-01-11

## 2021-01-11 RX ORDER — SODIUM CHLORIDE 450 MG/100ML
250 INJECTION, SOLUTION INTRAVENOUS CONTINUOUS
Status: DISCONTINUED | OUTPATIENT
Start: 2021-01-11 | End: 2021-01-11

## 2021-01-11 RX ORDER — PANTOPRAZOLE SODIUM 40 MG/1
40 INJECTION, POWDER, FOR SOLUTION INTRAVENOUS
Status: CANCELLED | OUTPATIENT
Start: 2021-01-11

## 2021-01-11 RX ORDER — METHYLPREDNISOLONE SODIUM SUCCINATE 125 MG/2ML
125 INJECTION, POWDER, LYOPHILIZED, FOR SOLUTION INTRAMUSCULAR; INTRAVENOUS ONCE
Status: COMPLETED | OUTPATIENT
Start: 2021-01-11 | End: 2021-01-11

## 2021-01-11 RX ORDER — VANCOMYCIN HYDROCHLORIDE 1 G/200ML
1000 INJECTION, SOLUTION INTRAVENOUS EVERY 12 HOURS
Status: DISCONTINUED | OUTPATIENT
Start: 2021-01-12 | End: 2021-01-11

## 2021-01-11 RX ORDER — SODIUM CHLORIDE 9 MG/ML
3 INJECTION INTRAVENOUS
Status: CANCELLED | OUTPATIENT
Start: 2021-01-11

## 2021-01-11 RX ORDER — SODIUM CHLORIDE 450 MG/100ML
250 INJECTION, SOLUTION INTRAVENOUS CONTINUOUS
Status: DISCONTINUED | OUTPATIENT
Start: 2021-01-11 | End: 2021-01-11 | Stop reason: HOSPADM

## 2021-01-11 RX ORDER — DEXTROSE AND SODIUM CHLORIDE 5; .9 G/100ML; G/100ML
250 INJECTION, SOLUTION INTRAVENOUS CONTINUOUS
Status: DISCONTINUED | OUTPATIENT
Start: 2021-01-11 | End: 2021-01-11 | Stop reason: HOSPADM

## 2021-01-11 RX ORDER — ASCORBIC ACID 500 MG
1000 TABLET ORAL EVERY 12 HOURS SCHEDULED
Status: DISCONTINUED | OUTPATIENT
Start: 2021-01-11 | End: 2021-01-13 | Stop reason: HOSPADM

## 2021-01-11 RX ADMIN — METHYLPREDNISOLONE SODIUM SUCCINATE 125 MG: 125 INJECTION, POWDER, FOR SOLUTION INTRAMUSCULAR; INTRAVENOUS at 10:36

## 2021-01-11 RX ADMIN — SODIUM CHLORIDE, SODIUM GLUCONATE, SODIUM ACETATE, POTASSIUM CHLORIDE, MAGNESIUM CHLORIDE, SODIUM PHOSPHATE, DIBASIC, AND POTASSIUM PHOSPHATE 1000 ML: .53; .5; .37; .037; .03; .012; .00082 INJECTION, SOLUTION INTRAVENOUS at 19:01

## 2021-01-11 RX ADMIN — INSULIN HUMAN 20 UNITS: 100 INJECTION, SOLUTION PARENTERAL at 11:27

## 2021-01-11 RX ADMIN — SODIUM CHLORIDE 1 UNITS/HR: 9 INJECTION, SOLUTION INTRAVENOUS at 11:50

## 2021-01-11 RX ADMIN — SODIUM CHLORIDE 1000 ML: 0.9 INJECTION, SOLUTION INTRAVENOUS at 10:22

## 2021-01-11 RX ADMIN — SODIUM CHLORIDE 250 ML/HR: 0.45 INJECTION, SOLUTION INTRAVENOUS at 16:11

## 2021-01-11 RX ADMIN — POTASSIUM CHLORIDE 40 MEQ: 1500 TABLET, EXTENDED RELEASE ORAL at 13:48

## 2021-01-11 RX ADMIN — SODIUM BICARBONATE 50 MEQ: 84 INJECTION INTRAVENOUS at 12:15

## 2021-01-11 RX ADMIN — DOXYCYCLINE 100 MG: 100 INJECTION, POWDER, LYOPHILIZED, FOR SOLUTION INTRAVENOUS at 22:00

## 2021-01-11 RX ADMIN — SODIUM CHLORIDE 500 ML/HR: 0.9 INJECTION, SOLUTION INTRAVENOUS at 15:15

## 2021-01-11 RX ADMIN — POTASSIUM CHLORIDE 20 MEQ: 14.9 INJECTION, SOLUTION INTRAVENOUS at 17:16

## 2021-01-11 RX ADMIN — SODIUM CHLORIDE, SODIUM GLUCONATE, SODIUM ACETATE, POTASSIUM CHLORIDE, MAGNESIUM CHLORIDE, SODIUM PHOSPHATE, DIBASIC, AND POTASSIUM PHOSPHATE 150 ML/HR: .53; .5; .37; .037; .03; .012; .00082 INJECTION, SOLUTION INTRAVENOUS at 20:05

## 2021-01-11 RX ADMIN — DEXTROSE AND SODIUM CHLORIDE 250 ML/HR: 5; .9 INJECTION, SOLUTION INTRAVENOUS at 22:34

## 2021-01-11 RX ADMIN — Medication 8.1 UNITS/HR: at 13:39

## 2021-01-11 RX ADMIN — SODIUM CHLORIDE 1000 ML: 0.9 INJECTION, SOLUTION INTRAVENOUS at 13:39

## 2021-01-11 RX ADMIN — POTASSIUM PHOSPHATE, MONOBASIC AND POTASSIUM PHOSPHATE, DIBASIC 30 MMOL: 224; 236 INJECTION, SOLUTION, CONCENTRATE INTRAVENOUS at 22:00

## 2021-01-11 RX ADMIN — POTASSIUM CHLORIDE 20 MEQ: 14.9 INJECTION, SOLUTION INTRAVENOUS at 16:15

## 2021-01-11 RX ADMIN — PIPERACILLIN SODIUM AND TAZOBACTAM SODIUM 3.38 G: 3; .375 INJECTION, POWDER, LYOPHILIZED, FOR SOLUTION INTRAVENOUS at 11:50

## 2021-01-11 RX ADMIN — VANCOMYCIN HYDROCHLORIDE 1000 MG: 1 INJECTION, SOLUTION INTRAVENOUS at 15:15

## 2021-01-11 RX ADMIN — SODIUM CHLORIDE 8.1 UNITS/HR: 9 INJECTION, SOLUTION INTRAVENOUS at 19:56

## 2021-01-11 RX ADMIN — LIDOCAINE HYDROCHLORIDE 1 APPLICATION: 20 JELLY TOPICAL at 13:14

## 2021-01-11 RX ADMIN — ALBUTEROL SULFATE 2.5 MG: 2.5 SOLUTION RESPIRATORY (INHALATION) at 10:37

## 2021-01-11 NOTE — ASSESSMENT & PLAN NOTE
· Secondary to COVID likely exacerbated by DKA and superimposed bacterial infection  · Patient has had increasing oxygen requirements, currently on 100% FiO2 Vapotherm saturating 90%  · Case discussed with Critical Care who recommended transfer for higher level of care due to increasing oxygen requirements

## 2021-01-11 NOTE — H&P
H&P- Rosa Primer 1960, 56 y o  male MRN: [id number]    Unit/Bed#: ED 12 Encounter: [id number]    Primary Care Provider: VERONICA Corea   Date and time admitted to hospital: 1/11/2021  9:56 AM        * Diabetic ketoacidosis without coma associated with type 2 diabetes mellitus ([facility] [address] )  Assessment & Plan  · Possibly triggered by bacterial infection/COVID infection  · Initiate DKA protocol  · Continue IV fluids per protocol  · Replace electrolytes as needed  · Patient received bicarb in the ER due to significant metabolic acidosis  · NPO for now    Sepsis due to COVID-19 [facility])  Assessment & Plan  · Patient was diagnosed with COVID on 12/31/2021  · Did not require admission during that time as he was not hypoxic and was sent home with symptomatic treatment  · At this time given that patient is 12 days out from initial diagnosis will hold off on remdesivir therapy  · Patient received 125 mg of Solu-Medrol in the ER, will continue dexamethasone starting tomorrow  · Also likely not a candidate for plasma, will discuss with Infectious Disease  · Will monitor inflammatory markers  · Continue oxygen and titrate as tolerated  · Will initiate antibiotics with cefepime/vancomycin for suspected underlying bacterial illness  · Will trend procalcitonin, if negative x2 will discontinue antibiotics  · Follow-up MRSA screen  · Check strep pneumo/urine Legionella    Acute metabolic encephalopathy  Assessment & Plan  · Likely multifactorial given DKA as well as suspected infection  · Will check CT head  · Continue to monitor neuro checks  · Supportive care  · Continue treatment for infection DKA    Acute respiratory failure with hypoxia ([facility] [address] )  Assessment & Plan  · Secondary to COVID likely exacerbated by DKA  · Will continue oxygen and titrate as tolerated  · Patient will be monitored in the ICU  · Chest x-ray with worsening ground-glass opacities  · Consult critical care    Acute kidney injury [facility])  Assessment & Plan  · Secondary to dehydration/DKA/infection  · Will continue IV fluids  · Hold any nephrotoxic meds  · Will monitor creatinine  · Check renal ultrasound  · Place Veras catheter to monitor urine output  · If creatinine worsening will consult Nephrology      VTE Prophylaxis: Will initiate DVT prophylaxis once head CT is performed  Code Status:  Full code  Discussion with family:  Spoke with patient's nephew over the phone regarding plan of care and patient's current clinical condition  Anticipated Length of Stay:  Patient will be admitted on an Inpatient basis with an anticipated length of stay of  > 2 midnights  Justification for Hospital Stay:  DKA/respiratory failure    Chief Complaint:   Shortness of breath    History of Present Illness:    Michael Larkin is a 56 y o  male who presents with shortness of breath  Unable to get any history from patient as he is unable to speak due to what family states is a severe sore throat  Patient is following commands and does nod his head yes and no to questions  Per notes patient was diagnosed with COVID on 12/31/2020 however was sent home as he was not requiring oxygen and treat himself symptomatically at home  Over the last week he has had nausea/vomiting and came to the ER and was discharged with prescription for Zofran  Over the last 2 days per family patient has had worsening sore throat and poor p o  Intake  In the ER today patient was found to be hypoxic and in DKA  Patient placed on oxygen and started on DKA protocol  Patient being admitted to the ICU for DKA    Review of Systems:    Review of Systems   Constitutional: Positive for activity change, appetite change and fatigue  Respiratory: Positive for cough and shortness of breath  Gastrointestinal: Positive for nausea and vomiting  Negative for abdominal pain  All other systems reviewed and are negative        Past Medical and Surgical History:     Past Medical History:   Diagnosis Date    Diabetes mellitus ([facility] )     GERD (gastroesophageal reflux disease)     Hypercholesteremia     Hypertension     Methadone use ([facility] )        History reviewed  No pertinent surgical history  Meds/Allergies:    Prior to Admission medications    Medication Sig Start Date End Date Taking?  Authorizing Provider   Blood Glucose Monitoring Suppl ([employer]) w/Device KIT Use to test blood sugars 3 times daily 12/5/19   Annika Patel PA-C   Empagliflozin (Jardiance) 25 MG TABS Take 1 tablet (25 mg total) by mouth every morning 6/9/20   Annika Patel PA-C   ergocalciferol (VITAMIN D2) 50,000 units Take 1 capsule (50,000 Units total) by mouth once a week 7/9/20   VERONICA Evangelista   erythromycin (ILOTYCIN) ophthalmic ointment 0 5 inches daily at bedtime    Historical Provider, MD   furosemide (LASIX) 40 mg tablet Take 1 tablet (40 mg total) by mouth daily  Patient taking differently: Take 40 mg by mouth as needed (on off work days)  5/28/19   VERONICA Evangelista   gabapentin (NEURONTIN) 100 mg capsule Take 1 capsule (100 mg total) by mouth 3 (three) times a day 6/22/20   VERONICA Evangelista   glucose blood (OneTouch Verio) test strip Use to test blood sugars 3 times daily 8/15/20   VERONICA Evangelista   insulin glargine (LANTUS) 100 units/mL subcutaneous injection Inject 10 Units under the skin daily at bedtime  Patient taking differently: Inject 20 Units under the skin daily at bedtime  12/11/20   Codi Rosen DO   insulin NPH-insulin regular (NovoLIN 70/30) 100 units/mL subcutaneous injection Inject 40 Units under the skin 2 (two) times a day before meals  Patient taking differently: Inject 40 Units under the skin 2 (two) times a day before meals Pt states he takes it "once in awhile" 7/13/20 12/31/20  VERONICA Evangelista   Insulin Pen Needle 31G X 5 MM MISC by Does not apply route daily Pt to inject 4 X daily 10/11/18   VERONICA Evangelista   Insulin Pen Needle 31G X 5 MM MISC 30 units sq 2X daily 1/3/19 VERONICA Crenshaw   isosorbide mononitrate (IMDUR) 120 mg 24 hr tablet Take 1 tablet (120 mg total) by mouth daily 6/5/20   VERONICA Crenshaw   lisinopril-hydrochlorothiazide (PRINZIDE,ZESTORETIC) 20-25 MG per tablet Take 1 tablet by mouth daily 6/5/20   VERONICA Crenshaw   methadone (DOLOPHINE) 10 MG/5ML solution Take 70 mg by mouth    Historical Provider, MD   omeprazole (PriLOSEC) 20 mg delayed release capsule Take 1 capsule (20 mg total) by mouth daily 6/5/20   VERONICA Crenshaw   ondansetron (ZOFRAN-ODT) 4 mg disintegrating tablet Take 1 tablet (4 mg total) by mouth every 6 (six) hours as needed for nausea or vomiting 1/4/21   Bell Ward MD   potassium chloride (K-DUR,KLOR-CON) 20 mEq tablet Take 2 tablets (40 mEq total) by mouth daily 5/28/19   VERONICA Crenshaw   pravastatin (PRAVACHOL) 40 mg tablet Take 1 tablet (40 mg total) by mouth daily 6/22/20   VERONICA Crenshaw   promethazine (PHENERGAN) 25 mg tablet TAKE 1 TABLET TWICE A DAY 10/13/20   Chuck Bellamy DO   sitaGLIPtin-metFORMIN (JANUMET)  MG per tablet Take 1 tablet by mouth 2 (two) times a day with meals 7/9/20   VERONICA Crenshaw     all medications and allergies reviewed    Allergies:    Allergies   Allergen Reactions    Varenicline        Social History:     Marital Status:    Patient Pre-hospital Living Situation:  Lives with family  Patient Pre-hospital Level of Mobility:  Ambulatory  Patient Pre-hospital Diet Restrictions:  None  Substance Use History:   Social History     Substance and Sexual Activity   Alcohol Use No     Social History     Tobacco Use   Smoking Status Former Smoker   Smokeless Tobacco Never Used     Social History     Substance and Sexual Activity   Drug Use No    Comment: methadone       Family History:  I have reviewed the patient's family history    Physical Exam:     Vitals:   Blood Pressure: 121/71 (01/11/21 1200)  Pulse: (!) 112 (01/11/21 1200)  Temperature: (!) 96 8 °F (36 °C) (01/11/21 4199)  Temp Source: Temporal (01/11/21 0958)  Respirations: (!) 45 (01/11/21 1200)  Height: 5' 8" (172 7 cm) (01/11/21 0958)  Weight - Scale: 81 2 kg (179 lb) (01/11/21 0958)  SpO2: (!) 89 %(89-90%) (01/11/21 1307)    Physical Exam  Constitutional:       Appearance: He is ill-appearing  Comments: Patient is awake however not talking   HENT:      Head: Normocephalic and atraumatic  Nose: Nose normal       Mouth/Throat:      Mouth: Mucous membranes are dry  Eyes:      Extraocular Movements: Extraocular movements intact  Conjunctiva/sclera: Conjunctivae normal    Neck:      Musculoskeletal: Normal range of motion and neck supple  Cardiovascular:      Rate and Rhythm: Tachycardia present  Pulmonary:      Comments: Decreased breath sounds bilaterally with tachypnea  Abdominal:      Palpations: Abdomen is soft  Tenderness: There is no abdominal tenderness  Musculoskeletal:      Comments: Trace bilateral lower extremity edema   Skin:     Comments: Chronic venous stasis changes of bilateral lower extremities   Neurological:      Comments: Patient is awake and alert however not talking  Patient does follow commands as he did stick out his tongue when asked and lift up his upper extremities on command   Psychiatric:         Behavior: Behavior normal          Additional Data:     Lab Results: I have personally reviewed pertinent reports  Results from last 7 days   Lab Units 01/11/21  1020  01/04/21  1715   WBC Thousand/uL 20 20*   < > 2 70*   HEMOGLOBIN g/dL 15 6   < > 14 7   HEMATOCRIT % 48 3*   < > 43 4   PLATELETS Thousands/uL 237   < > 75*   BANDS PCT %  --   --  4   LYMPHO PCT % 6*   < > 10*   MONO PCT % 7   < > 10    < > = values in this interval not displayed       Results from last 7 days   Lab Units 01/11/21  1020 01/06/21  0527   SODIUM mmol/L 143 132*   POTASSIUM mmol/L 3 8 5 1   CHLORIDE mmol/L 105 94*   CO2 mmol/L 10* 22   BUN mg/dL 65* 23   CREATININE mg/dL 1 47* 0 96 ANION GAP mmol/L 28* 16*   CALCIUM mg/dL 9 8 8 9   ALBUMIN g/dL  --  3 5   TOTAL BILIRUBIN mg/dL  --  0 80   ALK PHOS U/L  --  71   ALT U/L  --  27   AST U/L  --  58*   GLUCOSE RANDOM mg/dL 654* 279*     Results from last 7 days   Lab Units 01/11/21  1020   INR  1 38*     Results from last 7 days   Lab Units 01/11/21  1233 01/11/21  1023   POC GLUCOSE mg/dl 478* >500*         Results from last 7 days   Lab Units 01/11/21  1224 01/11/21  1020 01/04/21  1715   LACTIC ACID mmol/L 2 5* 2 8* 1 1       Imaging: I have personally reviewed pertinent reports  XR chest 1 view portable   Final Result by Alok Sorensen MD (01/11 1257)      Worsening groundglass opacities of the lungs from Covid 19 pneumonia                  Workstation performed: [id number]         CT head wo contrast    (Results Pending)       [facility] Everywhere Records Reviewed: Yes    ** Please Note: This note has been constructed using a voice recognition system   **

## 2021-01-11 NOTE — NURSING NOTE
Transferred to [facility] via [employer] at this time  Dr Nicolás Duvall informed family of transfer

## 2021-01-11 NOTE — ASSESSMENT & PLAN NOTE
· Patient is a 55 y/o M whom was found to be COVID-19 Positive on 12/31  · Since his initial diagnosis he had multiple visits to outside ER 2/2 nausea/vomiting; however was never noted to be hypoxic and subsequently discharged  · Patient per chart review he apparently has been experiencing Nausea/Vomiting over the past week and presented today with shortness of breath, sore throat as well as reported per PO intake per family  · Continue SpO2 monitoring, patient noted to have been on Vapotherm at [facility], transition to [facility]  · CXR from earlier today reviewed noting bilateral ground glass opacities  · Titrate FiO2 as tolerated  · Will place on the Severe COVID treatment algorithm

## 2021-01-11 NOTE — NURSING NOTE
Patient currently on Vapotherm 40L/FIO2 100%, SaO2 95% on same  NSR on monitor   Denies discomfort  Patient aware we are transferring him to [facility] due to his increased O2 requirements

## 2021-01-11 NOTE — ED PROVIDER NOTES
History  Chief Complaint   Patient presents with    Wheezing    Shortness of Breath    Weakness - Generalized     HPI  This is a 59-year-old male with history of diabetes COVID positive had multiple visits to ED for intractable nausea and vomiting  Patient presented to ED by EMS with the complain of sore throat and generalized weakness  Patient is somewhat confused unable to provide much history  Patient is not in his head during history process  As per EMS the patient was complaining of feeling weak and sore throat  He denies any abdominal pain nausea or vomiting  He states that he has not been eating or drinking much  Patient is tachypneic in the ED hypoxia is noted with oxygen Saturation of 83% on room air  Patient is placed on 3 L a cannula  Prior to Admission Medications   Prescriptions Last Dose Informant Patient Reported? Taking?    Blood Glucose Monitoring Suppl (Chuck Dust) w/Device KIT  Self No No   Sig: Use to test blood sugars 3 times daily   Empagliflozin (Jardiance) 25 MG TABS   No No   Sig: Take 1 tablet (25 mg total) by mouth every morning   Insulin Pen Needle 31G X 5 MM MISC  Self No No   Sig: by Does not apply route daily Pt to inject 4 X daily   Insulin Pen Needle 31G X 5 MM MISC  Self No No   Si units sq 2X daily   ergocalciferol (VITAMIN D2) 50,000 units   No No   Sig: Take 1 capsule (50,000 Units total) by mouth once a week   erythromycin (ILOTYCIN) ophthalmic ointment  Self Yes No   Si 5 inches daily at bedtime   furosemide (LASIX) 40 mg tablet  Self No No   Sig: Take 1 tablet (40 mg total) by mouth daily   Patient taking differently: Take 40 mg by mouth as needed (on off work days)    gabapentin (NEURONTIN) 100 mg capsule   No No   Sig: Take 1 capsule (100 mg total) by mouth 3 (three) times a day   glucose blood (OneTouch Verio) test strip   No No   Sig: Use to test blood sugars 3 times daily   insulin NPH-insulin regular (NovoLIN 70/30) 100 units/mL subcutaneous injection   No No   Sig: Inject 40 Units under the skin 2 (two) times a day before meals   Patient taking differently: Inject 40 Units under the skin 2 (two) times a day before meals Pt states he takes it "once in awhile"   insulin glargine (LANTUS) 100 units/mL subcutaneous injection   No No   Sig: Inject 10 Units under the skin daily at bedtime   Patient taking differently: Inject 20 Units under the skin daily at bedtime    isosorbide mononitrate (IMDUR) 120 mg 24 hr tablet   No No   Sig: Take 1 tablet (120 mg total) by mouth daily   lisinopril-hydrochlorothiazide (PRINZIDE,ZESTORETIC) 20-25 MG per tablet   No No   Sig: Take 1 tablet by mouth daily   methadone (DOLOPHINE) 10 MG/5ML solution  Self Yes No   Sig: Take 70 mg by mouth   omeprazole (PriLOSEC) 20 mg delayed release capsule   No No   Sig: Take 1 capsule (20 mg total) by mouth daily   ondansetron (ZOFRAN-ODT) 4 mg disintegrating tablet   No No   Sig: Take 1 tablet (4 mg total) by mouth every 6 (six) hours as needed for nausea or vomiting   potassium chloride (K-DUR,KLOR-CON) 20 mEq tablet  Self No No   Sig: Take 2 tablets (40 mEq total) by mouth daily   pravastatin (PRAVACHOL) 40 mg tablet   No No   Sig: Take 1 tablet (40 mg total) by mouth daily   promethazine (PHENERGAN) 25 mg tablet   No No   Sig: TAKE 1 TABLET TWICE A DAY   sitaGLIPtin-metFORMIN (JANUMET)  MG per tablet   No No   Sig: Take 1 tablet by mouth 2 (two) times a day with meals      Facility-Administered Medications: None       Past Medical History:   Diagnosis Date    Diabetes mellitus (HCC)     GERD (gastroesophageal reflux disease)     Hypercholesteremia     Hypertension     Methadone use ([facility] [address] )        History reviewed  No pertinent surgical history      Family History   Problem Relation Age of Onset    Diabetes Mother     Hypertension Father     Leukemia Father     Acute lymphoblastic leukemia Father     Cancer Sister      I have reviewed and agree with the history as documented  E-Cigarette/Vaping    E-Cigarette Use Never User      E-Cigarette/Vaping Substances    Nicotine No     THC No     CBD No     Flavoring No     Other No     Unknown No      Social History     Tobacco Use    Smoking status: Former Smoker    Smokeless tobacco: Never Used   Substance Use Topics    Alcohol use: No    Drug use: No     Comment: methadone       Review of Systems   Constitutional: Positive for appetite change and fatigue  HENT: Positive for sore throat  Eyes: Negative  Respiratory: Positive for wheezing  Cardiovascular: Negative  Gastrointestinal: Negative  Genitourinary: Negative  Musculoskeletal: Negative  Skin: Negative  Allergic/Immunologic: Negative  Neurological: Positive for weakness  Psychiatric/Behavioral: The patient is nervous/anxious  Physical Exam  Physical Exam  Vitals signs and nursing note reviewed  Constitutional:       Appearance: He is ill-appearing  HENT:      Head: Normocephalic and atraumatic  Comments: Unable to examine patient throat refused examination  Neck:      Musculoskeletal: Normal range of motion  Cardiovascular:      Rate and Rhythm: Tachycardia present  Pulmonary:      Effort: Tachypnea present  Breath sounds: Wheezing present  Abdominal:      General: Bowel sounds are normal       Palpations: Abdomen is soft  Musculoskeletal: Normal range of motion  Right lower leg: No edema  Skin:     General: Skin is warm  Neurological:      General: No focal deficit present  Mental Status: He is alert  Comments: Confused   Psychiatric:         Mood and Affect: Mood is anxious           Vital Signs  ED Triage Vitals   Temperature Pulse Respirations Blood Pressure SpO2   01/11/21 0958 01/11/21 0958 01/11/21 0958 01/11/21 0958 01/11/21 0958   (!) 96 8 °F (36 °C) (!) 109 (!) 28 143/81 (!) 84 %      Temp Source Heart Rate Source Patient Position - Orthostatic VS BP Location FiO2 (%) 01/11/21 0958 01/11/21 0958 01/11/21 0958 01/11/21 0958 01/11/21 1307   Temporal Monitor Lying Left arm 80      Pain Score       01/11/21 0958       4           Vitals:    01/11/21 1000 01/11/21 1100 01/11/21 1130 01/11/21 1200   BP: 142/82 144/76 140/87 121/71   Pulse: (!) 109 (!) 110 (!) 109 (!) 112   Patient Position - Orthostatic VS:             Visual Acuity      ED Medications  Medications   sodium chloride (PF) 0 9 % injection 3 mL (has no administration in time range)   potassium chloride (K-DUR,KLOR-CON) CR tablet 40 mEq (has no administration in time range)   sodium chloride 0 9 % bolus 1,000 mL (1,000 mL Intravenous New Bag 1/11/21 1339)   sodium chloride 0 9 % infusion (has no administration in time range)     Followed by   sodium chloride 0 9 % infusion (has no administration in time range)     Followed by   sodium chloride 0 9 % infusion (has no administration in time range)   dextrose 5 % and sodium chloride 0 9 % infusion (has no administration in time range)   insulin regular (HumuLIN R,NovoLIN R) 1 Units/mL in sodium chloride 0 9 % 100 mL infusion (8 1 Units/hr Intravenous New Bag 1/11/21 1339)   dexamethasone (DECADRON) injection 6 mg (has no administration in time range)   cefepime (MAXIPIME) IVPB (premix in dextrose) 2,000 mg 50 mL (has no administration in time range)   vancomycin (VANCOCIN) IVPB (premix in dextrose) 1,000 mg 200 mL (has no administration in time range)   sodium chloride 0 9 % bolus 1,000 mL (1,000 mL Intravenous New Bag 1/11/21 1022)   methylPREDNISolone sodium succinate (Solu-MEDROL) injection 125 mg (125 mg Intravenous Given 1/11/21 1036)   albuterol inhalation solution 2 5 mg (2 5 mg Nebulization Given 1/11/21 1037)   insulin regular (HumuLIN R,NovoLIN R) injection 20 Units (20 Units Intravenous Given 1/11/21 1127)   piperacillin-tazobactam (ZOSYN) 3 375 g in sodium chloride 0 9 % 100 mL IVPB (3 375 g Intravenous New Bag 1/11/21 1150)   sodium bicarbonate 8 4 % injection 50 mEq (50 mEq Intravenous Given 1/11/21 1215)       Diagnostic Studies  Results Reviewed     Procedure Component Value Units Date/Time    Strep Pneumoniae, Urine [[id number]]     Lab Status: No result Specimen: Urine     Legionella antigen, urine [[id number]]     Lab Status: No result Specimen: Urine     MRSA culture [[id number]]     Lab Status: No result Specimen: Nares     Fingerstick Glucose (POCT) [[id number]]  (Abnormal) Collected: 01/11/21 1233    Lab Status: Final result Updated: 01/11/21 1311     POC Glucose 478 mg/dl     Basic metabolic panel [[id number]]     Lab Status: No result Specimen: Blood     Basic metabolic panel [[id number]]     Lab Status: No result Specimen: Blood     Magnesium [[id number]]     Lab Status: No result Specimen: Blood     Magnesium [[id number]]     Lab Status: No result Specimen: Blood     Phosphorus [[id number]]     Lab Status: No result Specimen: Blood     Phosphorus [[id number]]     Lab Status: No result Specimen: Blood     Lactic acid 2 Hours [[id number]]  (Abnormal) Collected: 01/11/21 1224    Lab Status: Final result Specimen: Blood from Arm, Right Updated: 01/11/21 1256     LACTIC ACID 2 5 mmol/L     Narrative:      Result may be elevated if tourniquet was used during collection      Lipase [[id number]]     Lab Status: No result Specimen: Blood     Hepatic function panel [[id number]]     Lab Status: No result Specimen: Blood     Protime-INR [[id number]]  (Abnormal) Collected: 01/11/21 1020    Lab Status: Final result Specimen: Blood Updated: 01/11/21 1227     Protime 16 8 seconds      INR 1 38    APTT [[id number]]  (Normal) Collected: 01/11/21 1020    Lab Status: Final result Specimen: Blood Updated: 01/11/21 1227     PTT 26 seconds     Blood gas, venous [[id number]]  (Abnormal) Collected: 01/11/21 1151    Lab Status: Final result Specimen: Blood from Arm, Right Updated: 01/11/21 1202     pH, Nabil 7 110     pCO2, Nabil 22 8 mm Hg      pO2, Nabil 70 0 mm Hg      HCO3, Nabil 6 9 mmol/L      Base Excess, Nabil -22 7 mmol/L      O2 Content, Nabil 19 5 ml/dL      O2 HGB, VENOUS 83 5 %     Procalcitonin with AM Reflex [[id number]] Collected: 01/11/21 1151    Lab Status: In process Specimen: Blood from Arm, Right Updated: 01/11/21 1155    Urinalysis with microscopic [[id number]]     Lab Status: No result Specimen: Urine, Clean Catch     Manual Differential (Non Wam) [[id number]]  (Abnormal) Collected: 01/11/21 1020    Lab Status: Final result Specimen: Blood from Arm, Right Updated: 01/11/21 1122     Segmented % 87 %      Lymphocytes % 6 %      Monocytes % 7 %      Neutrophils Absolute 17 57 Thousand/uL      Lymphocytes Absolute 1 21 Thousand/uL      Monocytes Absolute 1 41 Thousand/uL      Total Counted 100     RBC Morphology abnormal     Anisocytosis Present     Platelet Estimate Adequate     Large Platelet Present    Lactic acid [[id number]]  (Abnormal) Collected: 01/11/21 1020    Lab Status: Final result Specimen: Blood from Arm, Right Updated: 01/11/21 1058     LACTIC ACID 2 8 mmol/L     Narrative:      Result may be elevated if tourniquet was used during collection      Troponin I [[id number]]  (Normal) Collected: 01/11/21 1020    Lab Status: Final result Specimen: Blood from Arm, Right Updated: 01/11/21 1057     Troponin I <0 03 ng/mL     Basic metabolic panel [[id number]]  (Abnormal) Collected: 01/11/21 1020    Lab Status: Final result Specimen: Blood from Arm, Right Updated: 01/11/21 1057     Sodium 143 mmol/L      Potassium 3 8 mmol/L      Chloride 105 mmol/L      CO2 10 mmol/L      ANION GAP 28 mmol/L      BUN 65 mg/dL      Creatinine 1 47 mg/dL      Glucose 654 mg/dL      Calcium 9 8 mg/dL      eGFR 50 ml/min/1 73sq m     Narrative:      [place] guidelines for Chronic Kidney Disease (CKD):     Stage 1 with normal or high GFR (GFR > 90 mL/min/1 73 square meters)    Stage 2 Mild CKD (GFR = 60-89 mL/min/1 73 square meters)    Stage 3A Moderate CKD (GFR = 45-59 mL/min/1 73 square meters)    Stage 3B Moderate CKD (GFR = 30-44 mL/min/1 73 square meters)    Stage 4 Severe CKD (GFR = 15-29 mL/min/1 73 square meters)    Stage 5 End Stage CKD (GFR <15 mL/min/1 73 square meters)  Note: GFR calculation is accurate only with a steady state creatinine    CBC and differential [[id number]]  (Abnormal) Collected: 01/11/21 1020    Lab Status: Final result Specimen: Blood from Arm, Right Updated: 01/11/21 1056     WBC 20 20 Thousand/uL      RBC 5 45 Million/uL      Hemoglobin 15 6 g/dL      Hematocrit 48 3 %      MCV 89 fL      MCH 28 7 pg      MCHC 32 3 g/dL      RDW 14 6 %      MPV 8 3 fL      Platelets 741 Thousands/uL     Magnesium [[id number]]  (Normal) Collected: 01/11/21 1020    Lab Status: Final result Specimen: Blood from Arm, Right Updated: 01/11/21 1054     Magnesium 2 6 mg/dL     Beta Hydroxybutyrate [[id number]]  (Abnormal) Collected: 01/11/21 1020    Lab Status: Final result Specimen: Blood from Arm, Right Updated: 01/11/21 1036     BETA-HYDROXYBUTYRATE 6 2 mmol/L     Blood culture #2 [[id number]] Collected: 01/11/21 1020    Lab Status: In process Specimen: Blood from Arm, Right Updated: 01/11/21 1030    Blood culture #1 [[id number]] Collected: 01/11/21 1020    Lab Status:  In process Specimen: Blood from Arm, Right Updated: 01/11/21 1030    Fingerstick Glucose (POCT) [[id number]]  (Abnormal) Collected: 01/11/21 1023    Lab Status: Final result Updated: 01/11/21 1029     POC Glucose >500 mg/dl                  XR chest 1 view portable   Final Result by Claudean Holes, MD (01/11 1257)      Worsening groundglass opacities of the lungs from Covid 19 pneumonia                  Workstation performed: [id number]         CT head wo contrast    (Results Pending)              Procedures  CriticalCare Time  Performed by: Tio Segura MD  Authorized by: Tio Segura MD     Critical care provider statement:     Critical care time (minutes):  120    Critical care start time:  1/11/2021 10:00 AM    Critical care end time:  1/11/2021 1:01 PM    Critical care time was exclusive of:  Separately billable procedures and treating other patients and teaching time    Critical care was necessary to treat or prevent imminent or life-threatening deterioration of the following conditions:  Circulatory failure, dehydration, endocrine crisis, metabolic crisis, sepsis and respiratory failure    Critical care was time spent personally by me on the following activities:  Blood draw for specimens, obtaining history from patient or surrogate, development of treatment plan with patient or surrogate, discussions with consultants, discussions with primary provider, evaluation of patient's response to treatment, examination of patient, review of old charts, re-evaluation of patient's condition, ordering and review of radiographic studies, ordering and review of laboratory studies, ordering and performing treatments and interventions and interpretation of cardiac output measurements    I assumed direction of critical care for this patient from another provider in my specialty: no               ED Course  ED Course as of Jan 11 1348   Mon Jan 11, 2021   1010 Pt is on 2L NC   SpO2(!): 84 %   1209 Pt keep taking his NC off       1224 Discuss with hospitalist  admit to ICU  States he will come and see the pt       [address] Dr Joshua Soto came to ED and saw the pt , recommending Vapotherm, hold intubation  States he will speak with Critical Care before making this decision  1259 Pt is placed on vapotherm  pH, Nabil(!): 7 110   1300 HCO3, Nabil(!): 6 9   1300 Acidosis , pt is given bicarbonate  , fluid resuscitation and insulin drip  Will continue to observe may require intubation   pH, Nabil(!): 7 110   1300 Patient is not struggling to breathe, he is tachypneic however  Patient received albuterol treatment wheezing has improved                                                MDM    Disposition  Final diagnoses:   [facility] Generalized weakness   Acidosis   Leukocytosis   Tachycardia   Hypoxia     Time reflects when diagnosis was documented in both MDM as applicable and the Disposition within this note     Time User Action Codes Description Comment    1/11/2021 12:25 PM Mgean [place] Add [E11 10] DKA (diabetic ketoacidoses) ([facility] [address] )     1/11/2021 12:25 PM Megan [place] Add [R53 1] Generalized weakness     1/11/2021 12:25 PM Megan [place] Add [E87 2] Acidosis     1/11/2021 12:25 PM Megan [place] Add [D72 829] Leukocytosis     1/11/2021 12:58 PM Florette Quiver [R00 0] Tachycardia     1/11/2021 12:59 PM Megan [place] Add [R09 02] Hypoxia     1/11/2021  1:07 PM Carole Singer Add [J96 01] Acute respiratory failure with hypoxia [facility])       ED Disposition     ED Disposition Condition Date/Time Comment    Admit Stable Mon Jan 11, 2021 12:25 PM Case was discussed with Dr Alannah De La Cruz and the patient's admission status was agreed to be Admission Status: inpatient status to the service of Dr Alannah De La Cruz    Follow-up Information    None         Patient's Medications   Discharge Prescriptions    No medications on file     No discharge procedures on file      PDMP Review     None          ED Provider  Electronically Signed by           Yue Murphy MD  01/11/21 7709

## 2021-01-11 NOTE — EMTALA/ACUTE CARE TRANSFER
[address] [facility] [id number] [phone]  Dept: [phone]      ACUTE CARE TRANSFER CONSENT    NAME Michelle CARSON 1960                              MRN [id number]    I have been informed of my rights regarding examination, treatment, and transfer   by Dr Kanchan Green MD    Benefits:   continuous critical care monitoring    Risks:   MVA, worsening sepsis, arrhythmia      Consent for Transfer:  I acknowledge that my medical condition has been evaluated and explained to me by the treating physician or other qualified medical person and/or my attending physician, who has recommended that I be transferred to the service of    at    The above potential benefits of such transfer, the potential risks associated with such transfer, and the probable risks of not being transferred have been explained to me, and I fully understand them  The doctor has explained that, in my case, the benefits of transfer outweigh the risks  I agree to be transferred  I authorize the performance of emergency medical procedures and treatments upon me in both transit and upon arrival at the receiving facility  Additionally, I authorize the release of any and all medical records to the receiving facility and request they be transported with me, if possible  I understand that the safest mode of transportation during a medical emergency is an ambulance and that the Hospital advocates the use of this mode of transport  Risks of traveling to the receiving facility by car, including absence of medical control, life sustaining equipment, such as oxygen, and medical personnel has been explained to me and I fully understand them  (HARDEEP CORRECT BOX BELOW)  [  ]  I consent to the stated transfer and to be transported by ambulance/helicopter    [  ]  I consent to the stated transfer, but refuse transportation by ambulance and accept full responsibility for my transportation by car  I understand the risks of non-ambulance transfers and I exonerate the Hospital and its staff from any deterioration in my condition that results from this refusal     X___________________________________________    DATE  21  TIME________  Signature of patient or legally responsible individual signing on patient behalf           RELATIONSHIP TO PATIENT_________________________          Provider Certification    NAME Whitney Roman DOB 1960                              MRN [id number]    A medical screening exam was performed on the above named patient  Based on the examination:    Condition Necessitating Transfer worsening respiratory status requiring continuous critical care monitoring    Patient Condition:   critical    Reason for Transfer:   continuous critical care monitoring    Transfer Requirements: Facility     · Space available and qualified personnel available for treatment as acknowledged by    · Agreed to accept transfer and to provide appropriate medical treatment as acknowledged by          · Appropriate medical records of the examination and treatment of the patient are provided at the time of transfer   [address] [facility], [address] _______  · Transfer will be performed by qualified personnel from    and appropriate transfer equipment as required, including the use of necessary and appropriate life support measures      Provider Certification: I have examined the patient and explained the following risks and benefits of being transferred/refusing transfer to the patient/family:         Based on these reasonable risks and benefits to the patient and/or the unborn child(perez), and based upon the information available at the time of the patients examination, I certify that the medical benefits reasonably to be expected from the provision of appropriate medical treatments at another medical facility outweigh the increasing risks, if any, to the individuals medical condition, and in the case of labor to the unborn child, from effecting the transfer      X____________________________________________ DATE 01/11/21        TIME_______      ORIGINAL - SEND TO MEDICAL RECORDS   COPY - SEND WITH PATIENT DURING TRANSFER

## 2021-01-11 NOTE — ASSESSMENT & PLAN NOTE
· Patient was diagnosed with COVID on 12/31/2021  · Did not require admission during that time as he was not hypoxic and was sent home with symptomatic treatment  · At this time given that patient is 12 days out from initial diagnosis will hold off on remdesivir therapy  · Patient received 125 mg of Solu-Medrol in the ER, will continue dexamethasone starting tomorrow  · Also likely not a candidate for plasma, will discuss with Infectious Disease  · Will monitor inflammatory markers  · Continue oxygen and titrate as tolerated  · Will initiate antibiotics with cefepime/vancomycin for suspected underlying bacterial illness  · Will trend procalcitonin, if negative x2 will discontinue antibiotics  · Follow-up MRSA screen  · Check strep pneumo/urine Legionella

## 2021-01-11 NOTE — ASSESSMENT & PLAN NOTE
· Possibly triggered by bacterial infection/COVID infection  · Will continue DKA protocol  · Continue IV fluids per protocol  · Replace electrolytes as needed  · Patient received bicarb in the ER due to significant metabolic acidosis  · NPO for now

## 2021-01-11 NOTE — ASSESSMENT & PLAN NOTE
· Possibly triggered by bacterial infection/COVID infection  · Initiate DKA protocol  · Continue IV fluids per protocol  · Replace electrolytes as needed  · Patient received bicarb in the ER due to significant metabolic acidosis  · NPO for now

## 2021-01-11 NOTE — ASSESSMENT & PLAN NOTE
· Likely multifactorial given DKA as well as suspected infection  · Will check CT head  · Continue to monitor neuro checks  · Supportive care  · Continue treatment for infection DKA

## 2021-01-11 NOTE — PROGRESS NOTES
Vancomycin Assessment    Quinton Chavez is a 56 y o  male who is currently receiving vancomycin 1000 mg IV Q12H for Pneumonia     Relevant clinical data and objective history reviewed:  Creatinine   Date Value Ref Range Status   01/11/2021 1 47 (H) 0 70 - 1 30 mg/dL Final     Comment:     Standardized to IDMS reference method   01/06/2021 0 96 0 70 - 1 30 mg/dL Final     Comment:     Standardized to IDMS reference method   01/04/2021 0 96 0 70 - 1 30 mg/dL Final     Comment:     Standardized to IDMS reference method     /71   Pulse (!) 112   Temp (!) 96 8 °F (36 °C) (Temporal)   Resp (!) 45   Ht 5' 8" (1 727 m)   Wt 81 2 kg (179 lb)   SpO2 (!) 89% Comment: 89-90%  BMI 27 22 kg/m²   No intake/output data recorded  Lab Results   Component Value Date/Time    BUN 65 (H) 01/11/2021 10:20 AM    WBC 20 20 (H) 01/11/2021 10:20 AM    HGB 15 6 01/11/2021 10:20 AM    HCT 48 3 (H) 01/11/2021 10:20 AM    MCV 89 01/11/2021 10:20 AM     01/11/2021 10:20 AM     Temp Readings from Last 3 Encounters:   01/11/21 (!) 96 8 °F (36 °C) (Temporal)   01/06/21 97 7 °F (36 5 °C) (Oral)   01/04/21 (!) 97 3 °F (36 3 °C) (Temporal)     Vancomycin Days of Therapy: 1    Assessment/Plan  The patient is currently on vancomycin utilizing scheduled dosing based on actual body weight  Baseline risks associated with therapy include: concomitant nephrotoxic medications and dehydration  The patient is currently receiving 1000 mg IV Q12H and is clinically appropriate and dose will be continued  Pharmacy will also follow closely for s/sx of nephrotoxicity, infusion reactions, and appropriateness of therapy  BMP and CBC will be ordered per protocol  Plan for trough as patient approaches steady state, prior to the 4th  dose at approximately 0145 on 01/13/2021  Due to infection severity, will target a trough of 15-20 (appropriate for most indications)     Pharmacy will continue to follow the patients culture results and clinical progress daily      Chadwick Duron, Pharmacist

## 2021-01-11 NOTE — PLAN OF CARE
Received from ER at 1500 to ICU bed 4 dx Sepsis, DKA, +COVID  Patient was awake and alert on arrival, unable to assess orientation as patient wasn't answering orientation questions, occasionally spoke yes or no in a whisper but nodded yes or no when asked simple questions  When asked if throat sore patient nodded yes  Asked if any pain nodded "no"  Respiratory rate 40's/min at rest   Vapotherm applied on arrival (was on NRB at 15L  Admit assessment completed, see chart for details  Attempted to orient to room and alert system  Call bell in reach  Bed alarm on

## 2021-01-11 NOTE — ASSESSMENT & PLAN NOTE
· Secondary to COVID likely exacerbated by DKA  · Will continue oxygen and titrate as tolerated  · Patient will be monitored in the ICU  · Chest x-ray with worsening ground-glass opacities  · Consult critical care

## 2021-01-11 NOTE — DISCHARGE SUMMARY
Discharge- Swetha Agarwal 1960, 56 y o  male MRN: [id number]    Unit/Bed#: ICU 04 Encounter: [id number]    Primary Care Provider: VERONICA Walker   Date and time admitted to hospital: 1/11/2021  9:56 AM        Acute respiratory failure with hypoxia ([facility] [address] )  Assessment & Plan  · Secondary to COVID likely exacerbated by DKA and superimposed bacterial infection  · Patient has had increasing oxygen requirements, currently on 100% FiO2 Vapotherm saturating 90%  · Case discussed with Critical Care who recommended transfer for higher level of care due to increasing oxygen requirements    * Diabetic ketoacidosis without coma associated with type 2 diabetes mellitus ([facility] [address] )  Assessment & Plan  · Possibly triggered by bacterial infection/COVID infection  · Will continue DKA protocol  · Continue IV fluids per protocol  · Replace electrolytes as needed  · Patient received bicarb in the ER due to significant metabolic acidosis  · NPO for now    Hypernatremia  Assessment & Plan  · Discussed with nephrology, will adjust fluids from normal saline to half-normal saline for now  Will continue to monitor BMP every 4 hours  Continue DKA protocol  Please see H&P for details regarding initial admission information  Patient was admitted for a few hours and due to increasing oxygen requirements and need for continuous critical care monitoring critical care recommended transferring for higher level of care  Patient was initially started on DKA protocol which is being continued at this time  Fluids have been adjusted to half-normal saline due to hypernatremia  From a respiratory standpoint patient has been having increasing oxygen requirements as he is currently requiring 100% FiO2 on Vapotherm  Discussed with Critical Care who recommended transfer  Spoke with Critical Care at [facility] and patient has been accepted for transfer    At this time patient is tachypneic however oxygen saturation is maintained on 100% FiO2 of Vapotherm  Patient is on broad-spectrum antibiotics for suspected underlying bacterial infection  Patient was diagnosed with COVID on 12/31/2020, will need to discuss with Infectious Disease were critical care regarding benefit of initiating remdesivir, plasma, and Actemra  Metabolic encephalopathy, unable to perform CT head due to patient's respiratory status  When patient is more stable from a respiratory standpoint can consider CT head  At this time patient is awake and following simple commands  Moving all 4 extremities    Spoke with patient's nephew over the phone regarding plan to transfer and patient's poor clinical condition  Patient's sister is listed as emergency contact however she is sick with COVID at this time and could not speak to me  Therefore spoke with patient's nephew who will relay the information to his mother

## 2021-01-11 NOTE — ASSESSMENT & PLAN NOTE
Lab Results   Component Value Date    HGBA1C 11 6 (H) 12/09/2020       Recent Labs     01/11/21  1508 01/11/21  1604 01/11/21  1714 01/11/21  1740   POCGLU 421* 397* 339* 305*       Blood Sugar Average: Last 72 hrs:  · Possibly triggered by bacterial infection/COVID infection  · Beta Hydroxybuterate of 6 2  · Was Initiated on DKA protocol at [facility]  · Received 1 5L NSS  · Noted most recent BMP with most recent Na 150 at [facility]  · Will administer additional Fluid resuscitation with Isolyte, administering 1L bolus now  · Will then place on 125ml/h Isolyte  · Will obtain repeat BMP as well as Mg/Phos  · Replete electrolytes as needed  · Patient received bicarb in the ER due to significant metabolic acidosis  · Will continue NPO as patient remains encephalopathic

## 2021-01-11 NOTE — NURSING NOTE
[employer] at bedside, report given  Blood sugar checked at this time 305  Insulin drip currently at 16 2 units/hr  To keep drip at current rate per crew  Will continue to follow protocol at [address] per crew

## 2021-01-11 NOTE — ASSESSMENT & PLAN NOTE
· Secondary to dehydration/DKA/infection  · Will continue IV fluids  · Hold any nephrotoxic meds  · Will monitor creatinine  · Check renal ultrasound  · Place Veras catheter to monitor urine output  · If creatinine worsening will consult Nephrology

## 2021-01-11 NOTE — ASSESSMENT & PLAN NOTE
· Discussed with nephrology, will adjust fluids from normal saline to half-normal saline for now  Will continue to monitor BMP every 4 hours  Continue DKA protocol

## 2021-01-11 NOTE — NURSING NOTE
Dr Luli Vitale questioned if patient had head CT before coming to floor  Informed him he did not and is currently on Vapotherm 40L/80% and would have to go to CT on NRB at 15, unsure patient could tolerate same    Will hold CT for now per MD

## 2021-01-12 ENCOUNTER — APPOINTMENT (INPATIENT)
Dept: CT IMAGING | Facility: HOSPITAL | Age: 61
DRG: 871 | End: 2021-01-12
Payer: COMMERCIAL

## 2021-01-12 ENCOUNTER — APPOINTMENT (INPATIENT)
Dept: NON INVASIVE DIAGNOSTICS | Facility: HOSPITAL | Age: 61
DRG: 871 | End: 2021-01-12
Payer: COMMERCIAL

## 2021-01-12 ENCOUNTER — APPOINTMENT (INPATIENT)
Dept: RADIOLOGY | Facility: HOSPITAL | Age: 61
DRG: 871 | End: 2021-01-12
Payer: COMMERCIAL

## 2021-01-12 ENCOUNTER — APPOINTMENT (INPATIENT)
Dept: VASCULAR ULTRASOUND | Facility: HOSPITAL | Age: 61
DRG: 871 | End: 2021-01-12
Payer: COMMERCIAL

## 2021-01-12 LAB
ABO GROUP BLD: NORMAL
ADDITIONAL SETTING: 6
AMMONIA PLAS-SCNC: 36 UMOL/L (ref 11–35)
ANCILLARY VALUES: 4
ANCILLARY VALUES: ABNORMAL
ANION GAP SERPL CALCULATED.3IONS-SCNC: 11 MMOL/L (ref 4–13)
ANION GAP SERPL CALCULATED.3IONS-SCNC: 11 MMOL/L (ref 4–13)
ANION GAP SERPL CALCULATED.3IONS-SCNC: 8 MMOL/L (ref 4–13)
ANION GAP SERPL CALCULATED.3IONS-SCNC: 8 MMOL/L (ref 4–13)
ANION GAP SERPL CALCULATED.3IONS-SCNC: 9 MMOL/L (ref 4–13)
ANION GAP SERPL CALCULATED.3IONS-SCNC: 9 MMOL/L (ref 4–13)
APTT PPP: 29 SECONDS (ref 23–37)
APTT PPP: 34 SECONDS (ref 23–37)
ARTERIAL PATENCY WRIST A: ABNORMAL
ARTERIAL PATENCY WRIST A: ABNORMAL
ARTERIAL PATENCY WRIST A: YES
ATRIAL RATE: 101 BPM
BACTERIA UR QL AUTO: ABNORMAL /HPF
BACTERIA UR QL AUTO: ABNORMAL /HPF
BASE EX.OXY STD BLDV CALC-SCNC: 90.4 % (ref 60–80)
BASE EXCESS BLDA CALC-SCNC: -1.9 MMOL/L
BASE EXCESS BLDA CALC-SCNC: -4 MMOL/L (ref -2–3)
BASE EXCESS BLDA CALC-SCNC: -5 MMOL/L (ref -2–3)
BASE EXCESS BLDA CALC-SCNC: -6.1 MMOL/L
BASE EXCESS BLDV CALC-SCNC: -8.3 MMOL/L
BASOPHILS # BLD AUTO: 0.02 THOUSANDS/ΜL (ref 0–0.1)
BASOPHILS NFR BLD AUTO: 0 % (ref 0–1)
BILIRUB UR QL STRIP: ABNORMAL
BILIRUB UR QL STRIP: NEGATIVE
BLD GP AB SCN SERPL QL: NEGATIVE
BUN SERPL-MCNC: 32 MG/DL (ref 5–25)
BUN SERPL-MCNC: 36 MG/DL (ref 5–25)
BUN SERPL-MCNC: 45 MG/DL (ref 5–25)
BUN SERPL-MCNC: 47 MG/DL (ref 5–25)
CALCIUM SERPL-MCNC: 7.1 MG/DL (ref 8.3–10.1)
CALCIUM SERPL-MCNC: 7.2 MG/DL (ref 8.3–10.1)
CALCIUM SERPL-MCNC: 7.4 MG/DL (ref 8.3–10.1)
CALCIUM SERPL-MCNC: 7.7 MG/DL (ref 8.3–10.1)
CALCIUM SERPL-MCNC: 7.9 MG/DL (ref 8.3–10.1)
CALCIUM SERPL-MCNC: 8.1 MG/DL (ref 8.3–10.1)
CHLORIDE SERPL-SCNC: 123 MMOL/L (ref 100–108)
CHLORIDE SERPL-SCNC: 124 MMOL/L (ref 100–108)
CHLORIDE SERPL-SCNC: 125 MMOL/L (ref 100–108)
CHLORIDE SERPL-SCNC: 126 MMOL/L (ref 100–108)
CHLORIDE SERPL-SCNC: 129 MMOL/L (ref 100–108)
CHLORIDE SERPL-SCNC: 129 MMOL/L (ref 100–108)
CLARITY UR: ABNORMAL
CLARITY UR: ABNORMAL
CO2 SERPL-SCNC: 19 MMOL/L (ref 21–32)
CO2 SERPL-SCNC: 19 MMOL/L (ref 21–32)
CO2 SERPL-SCNC: 22 MMOL/L (ref 21–32)
CO2 SERPL-SCNC: 23 MMOL/L (ref 21–32)
CO2 SERPL-SCNC: 23 MMOL/L (ref 21–32)
CO2 SERPL-SCNC: 24 MMOL/L (ref 21–32)
COARSE GRAN CASTS URNS QL MICRO: ABNORMAL /LPF
COARSE GRAN CASTS URNS QL MICRO: ABNORMAL /LPF
COLOR UR: ABNORMAL
COLOR UR: ABNORMAL
CREAT SERPL-MCNC: 0.84 MG/DL (ref 0.6–1.3)
CREAT SERPL-MCNC: 0.92 MG/DL (ref 0.6–1.3)
CREAT SERPL-MCNC: 0.96 MG/DL (ref 0.6–1.3)
CREAT SERPL-MCNC: 1.03 MG/DL (ref 0.6–1.3)
CREAT SERPL-MCNC: 1.11 MG/DL (ref 0.6–1.3)
CREAT SERPL-MCNC: 1.17 MG/DL (ref 0.6–1.3)
CRP SERPL QL: 108.1 MG/L
DS:DELIVERY SYSTEM: AC
DS:DELIVERY SYSTEM: AC
EOSINOPHIL # BLD AUTO: 0 THOUSAND/ΜL (ref 0–0.61)
EOSINOPHIL NFR BLD AUTO: 0 % (ref 0–6)
ERYTHROCYTE [DISTWIDTH] IN BLOOD BY AUTOMATED COUNT: 13.9 % (ref 11.6–15.1)
ERYTHROCYTE [DISTWIDTH] IN BLOOD BY AUTOMATED COUNT: 14.3 % (ref 11.6–15.1)
FERRITIN SERPL-MCNC: 1716 NG/ML (ref 8–388)
FERRITIN SERPL-MCNC: 1854 NG/ML (ref 8–388)
FIO2 GAS DIL.REBREATH: 50 L
FIO2 GAS DIL.REBREATH: 60 L
GFR SERPL CREATININE-BSD FRML MDRD: 66 ML/MIN/1.73SQ M
GFR SERPL CREATININE-BSD FRML MDRD: 71 ML/MIN/1.73SQ M
GFR SERPL CREATININE-BSD FRML MDRD: 77 ML/MIN/1.73SQ M
GFR SERPL CREATININE-BSD FRML MDRD: 84 ML/MIN/1.73SQ M
GFR SERPL CREATININE-BSD FRML MDRD: 89 ML/MIN/1.73SQ M
GFR SERPL CREATININE-BSD FRML MDRD: 94 ML/MIN/1.73SQ M
GLUCOSE SERPL-MCNC: 116 MG/DL (ref 65–140)
GLUCOSE SERPL-MCNC: 132 MG/DL (ref 65–140)
GLUCOSE SERPL-MCNC: 136 MG/DL (ref 65–140)
GLUCOSE SERPL-MCNC: 140 MG/DL (ref 65–140)
GLUCOSE SERPL-MCNC: 199 MG/DL (ref 65–140)
GLUCOSE SERPL-MCNC: 202 MG/DL (ref 65–140)
GLUCOSE SERPL-MCNC: 223 MG/DL (ref 65–140)
GLUCOSE SERPL-MCNC: 228 MG/DL (ref 65–140)
GLUCOSE SERPL-MCNC: 249 MG/DL (ref 65–140)
GLUCOSE SERPL-MCNC: 252 MG/DL (ref 65–140)
GLUCOSE SERPL-MCNC: 260 MG/DL (ref 65–140)
GLUCOSE SERPL-MCNC: 276 MG/DL (ref 65–140)
GLUCOSE SERPL-MCNC: 347 MG/DL (ref 65–140)
GLUCOSE SERPL-MCNC: 65 MG/DL (ref 65–140)
GLUCOSE SERPL-MCNC: 78 MG/DL (ref 65–140)
GLUCOSE SERPL-MCNC: 79 MG/DL (ref 65–140)
GLUCOSE SERPL-MCNC: 81 MG/DL (ref 65–140)
GLUCOSE SERPL-MCNC: 85 MG/DL (ref 65–140)
GLUCOSE SERPL-MCNC: 90 MG/DL (ref 65–140)
GLUCOSE SERPL-MCNC: 92 MG/DL (ref 65–140)
GLUCOSE UR STRIP-MCNC: ABNORMAL MG/DL
GLUCOSE UR STRIP-MCNC: NEGATIVE MG/DL
HBV CORE AB SER QL: REACTIVE
HBV CORE IGM SER QL: ABNORMAL
HBV SURFACE AG SER QL: ABNORMAL
HCO3 BLDA-SCNC: 18 MMOL/L (ref 22–28)
HCO3 BLDA-SCNC: 19.3 MMOL/L (ref 22–28)
HCO3 BLDA-SCNC: 20.2 MMOL/L (ref 22–28)
HCO3 BLDA-SCNC: 20.6 MMOL/L (ref 22–28)
HCO3 BLDV-SCNC: 16.8 MMOL/L (ref 24–30)
HCT VFR BLD AUTO: 30.5 % (ref 36.5–49.3)
HCT VFR BLD AUTO: 37.5 % (ref 36.5–49.3)
HCT VFR BLD CALC: 28 % (ref 36.5–49.3)
HCT VFR BLD CALC: 29 % (ref 36.5–49.3)
HCV AB SER QL: ABNORMAL
HGB BLD-MCNC: 10.3 G/DL (ref 12–17)
HGB BLD-MCNC: 10.3 G/DL (ref 12–17)
HGB BLD-MCNC: 11.1 G/DL (ref 12–17)
HGB BLD-MCNC: 12.9 G/DL (ref 12–17)
HGB BLDA-MCNC: 9.5 G/DL (ref 12–17)
HGB BLDA-MCNC: 9.9 G/DL (ref 12–17)
HGB UR QL STRIP.AUTO: ABNORMAL
HGB UR QL STRIP.AUTO: ABNORMAL
IMM GRANULOCYTES # BLD AUTO: 0.06 THOUSAND/UL (ref 0–0.2)
IMM GRANULOCYTES NFR BLD AUTO: 1 % (ref 0–2)
INR PPP: 1.52 (ref 0.84–1.19)
INR PPP: 1.7 (ref 0.84–1.19)
IPAP: 14
KETONES UR STRIP-MCNC: ABNORMAL MG/DL
KETONES UR STRIP-MCNC: NEGATIVE MG/DL
L PNEUMO1 AG UR QL IA.RAPID: NEGATIVE
LACTATE SERPL-SCNC: 2.4 MMOL/L (ref 0.5–2)
LACTATE SERPL-SCNC: 2.8 MMOL/L (ref 0.5–2)
LACTATE SERPL-SCNC: 3.3 MMOL/L (ref 0.5–2)
LACTATE SERPL-SCNC: 4.6 MMOL/L (ref 0.5–2)
LEUKOCYTE ESTERASE UR QL STRIP: ABNORMAL
LEUKOCYTE ESTERASE UR QL STRIP: NEGATIVE
LIPASE SERPL-CCNC: 3951 U/L (ref 73–393)
LYMPHOCYTES # BLD AUTO: 0.55 THOUSANDS/ΜL (ref 0.6–4.47)
LYMPHOCYTES NFR BLD AUTO: 5 % (ref 14–44)
MAGNESIUM SERPL-MCNC: 1.9 MG/DL (ref 1.6–2.6)
MAGNESIUM SERPL-MCNC: 2 MG/DL (ref 1.6–2.6)
MAGNESIUM SERPL-MCNC: 2.4 MG/DL (ref 1.6–2.6)
MAGNESIUM SERPL-MCNC: 2.7 MG/DL (ref 1.6–2.6)
MCH RBC QN AUTO: 28.9 PG (ref 26.8–34.3)
MCH RBC QN AUTO: 28.9 PG (ref 26.8–34.3)
MCHC RBC AUTO-ENTMCNC: 33.8 G/DL (ref 31.4–37.4)
MCHC RBC AUTO-ENTMCNC: 34.4 G/DL (ref 31.4–37.4)
MCV RBC AUTO: 84 FL (ref 82–98)
MCV RBC AUTO: 85 FL (ref 82–98)
MONOCYTES # BLD AUTO: 0.35 THOUSAND/ΜL (ref 0.17–1.22)
MONOCYTES NFR BLD AUTO: 3 % (ref 4–12)
NEUTROPHILS # BLD AUTO: 10.78 THOUSANDS/ΜL (ref 1.85–7.62)
NEUTS SEG NFR BLD AUTO: 91 % (ref 43–75)
NITRITE UR QL STRIP: NEGATIVE
NITRITE UR QL STRIP: NEGATIVE
NON VENT- BIPAP: ABNORMAL
NON-SQ EPI CELLS URNS QL MICRO: ABNORMAL /HPF
NON-SQ EPI CELLS URNS QL MICRO: ABNORMAL /HPF
NRBC BLD AUTO-RTO: 0 /100 WBCS
O2 CT BLDA-SCNC: 14.9 ML/DL (ref 16–23)
O2 CT BLDA-SCNC: 17.5 ML/DL (ref 16–23)
O2 CT BLDV-SCNC: 14.3 ML/DL
OTHER STN SPEC: ABNORMAL
OXYHGB MFR BLDA: 94.2 % (ref 94–97)
OXYHGB MFR BLDA: 97.2 % (ref 94–97)
P AXIS: 65 DEGREES
PCO2 BLD: 20 MMOL/L (ref 21–32)
PCO2 BLD: 21 MMOL/L (ref 21–32)
PCO2 BLD: 28.9 MM HG (ref 36–44)
PCO2 BLD: 37 MM HG (ref 36–44)
PCO2 BLDA: 28.9 MM HG (ref 36–44)
PCO2 BLDA: 30.9 MM HG (ref 36–44)
PCO2 BLDV: 33.3 MM HG (ref 42–50)
PEEP MAX SETTING VENT: 8 CM[H2O]
PH BLD: 7.34 [PH] (ref 7.35–7.45)
PH BLD: 7.43 [PH] (ref 7.35–7.45)
PH BLDA: 7.38 [PH] (ref 7.35–7.45)
PH BLDA: 7.47 [PH] (ref 7.35–7.45)
PH BLDV: 7.32 [PH] (ref 7.3–7.4)
PH UR STRIP.AUTO: 6 [PH]
PH UR STRIP.AUTO: 6 [PH]
PHOSPHATE SERPL-MCNC: 1.2 MG/DL (ref 2.3–4.1)
PHOSPHATE SERPL-MCNC: 2.2 MG/DL (ref 2.3–4.1)
PHOSPHATE SERPL-MCNC: 2.6 MG/DL (ref 2.3–4.1)
PHOSPHATE SERPL-MCNC: 2.9 MG/DL (ref 2.3–4.1)
PHOSPHATE SERPL-MCNC: 3.5 MG/DL (ref 2.3–4.1)
PHOSPHATE SERPL-MCNC: 4.4 MG/DL (ref 2.3–4.1)
PLATELET # BLD AUTO: 108 THOUSANDS/UL (ref 149–390)
PLATELET # BLD AUTO: 51 THOUSANDS/UL (ref 149–390)
PMV BLD AUTO: 11.1 FL (ref 8.9–12.7)
PMV BLD AUTO: 9.7 FL (ref 8.9–12.7)
PO2 BLD: 51 MM HG (ref 75–129)
PO2 BLD: 67 MM HG (ref 75–129)
PO2 BLDA: 112.2 MM HG (ref 75–129)
PO2 BLDA: 75.5 MM HG (ref 75–129)
PO2 BLDV: 70.6 MM HG (ref 35–45)
POTASSIUM BLD-SCNC: 4.2 MMOL/L (ref 3.5–5.3)
POTASSIUM BLD-SCNC: 4.7 MMOL/L (ref 3.5–5.3)
POTASSIUM SERPL-SCNC: 3.5 MMOL/L (ref 3.5–5.3)
POTASSIUM SERPL-SCNC: 3.8 MMOL/L (ref 3.5–5.3)
POTASSIUM SERPL-SCNC: 4.3 MMOL/L (ref 3.5–5.3)
POTASSIUM SERPL-SCNC: 5 MMOL/L (ref 3.5–5.3)
POTASSIUM SERPL-SCNC: 5 MMOL/L (ref 3.5–5.3)
POTASSIUM SERPL-SCNC: 6.3 MMOL/L (ref 3.5–5.3)
PR INTERVAL: 120 MS
PRESSURE SETTING: 0
PRESSURE SETTING: 6
PROCALCITONIN SERPL-MCNC: 0.65 NG/ML
PROT UR STRIP-MCNC: ABNORMAL MG/DL
PROT UR STRIP-MCNC: ABNORMAL MG/DL
PROTHROMBIN TIME: 18.4 SECONDS (ref 11.6–14.5)
PROTHROMBIN TIME: 20 SECONDS (ref 11.6–14.5)
QRS AXIS: 62 DEGREES
QRSD INTERVAL: 64 MS
QT INTERVAL: 334 MS
QTC INTERVAL: 433 MS
RBC # BLD AUTO: 3.57 MILLION/UL (ref 3.88–5.62)
RBC # BLD AUTO: 4.47 MILLION/UL (ref 3.88–5.62)
RBC #/AREA URNS AUTO: ABNORMAL /HPF
RBC #/AREA URNS AUTO: ABNORMAL /HPF
RESPIRATORY RATE: 14
RESPIRATORY RATE: 15
RH BLD: POSITIVE
S PNEUM AG UR QL: NEGATIVE
SAMPLE SITE: ABNORMAL
SAMPLE SITE: ABNORMAL
SAO2 % BLD FROM PO2: 88 % (ref 60–85)
SAO2 % BLD FROM PO2: 92 % (ref 60–85)
SIMV VENT INSPIRED AIR FIO2: 60
SIMV VENT INSPIRED AIR FIO2: 60
SIMV VENT PEEP: 6
SIMV VENT PEEP: 6
SIMV VENT TIDAL VOLUME: 500
SIMV VENT TIDAL VOLUME: 500
SIMV VENT: ABNORMAL
SIMV VENT: ABNORMAL
SODIUM BLD-SCNC: 156 MMOL/L (ref 136–145)
SODIUM BLD-SCNC: 159 MMOL/L (ref 136–145)
SODIUM SERPL-SCNC: 153 MMOL/L (ref 136–145)
SODIUM SERPL-SCNC: 154 MMOL/L (ref 136–145)
SODIUM SERPL-SCNC: 154 MMOL/L (ref 136–145)
SODIUM SERPL-SCNC: 160 MMOL/L (ref 136–145)
SODIUM SERPL-SCNC: 160 MMOL/L (ref 136–145)
SODIUM SERPL-SCNC: 161 MMOL/L (ref 136–145)
SP GR UR STRIP.AUTO: 1.02 (ref 1–1.03)
SP GR UR STRIP.AUTO: 1.02 (ref 1–1.03)
SPECIMEN EXPIRATION DATE: NORMAL
SPECIMEN SOURCE: ABNORMAL
T WAVE AXIS: 103 DEGREES
TRIGL SERPL-MCNC: 361 MG/DL
UROBILINOGEN UR QL STRIP.AUTO: 0.2 E.U./DL
UROBILINOGEN UR QL STRIP.AUTO: 1 E.U./DL
VENT BIPAP FIO2: 80 %
VENT SIMV: 15
VENT SIMV: 15
VENTILATION VALUE: 144
VENTILATION VALUE: 500
VENTRICULAR RATE: 101 BPM
WBC # BLD AUTO: 11.76 THOUSAND/UL (ref 4.31–10.16)
WBC # BLD AUTO: 6.94 THOUSAND/UL (ref 4.31–10.16)
WBC #/AREA URNS AUTO: ABNORMAL /HPF
WBC #/AREA URNS AUTO: ABNORMAL /HPF

## 2021-01-12 PROCEDURE — 71045 X-RAY EXAM CHEST 1 VIEW: CPT

## 2021-01-12 PROCEDURE — 85025 COMPLETE CBC W/AUTO DIFF WBC: CPT | Performed by: PHYSICIAN ASSISTANT

## 2021-01-12 PROCEDURE — 83690 ASSAY OF LIPASE: CPT | Performed by: PHYSICIAN ASSISTANT

## 2021-01-12 PROCEDURE — 82948 REAGENT STRIP/BLOOD GLUCOSE: CPT

## 2021-01-12 PROCEDURE — 81001 URINALYSIS AUTO W/SCOPE: CPT | Performed by: PHYSICIAN ASSISTANT

## 2021-01-12 PROCEDURE — 87086 URINE CULTURE/COLONY COUNT: CPT | Performed by: NURSE PRACTITIONER

## 2021-01-12 PROCEDURE — 93308 TTE F-UP OR LMTD: CPT

## 2021-01-12 PROCEDURE — 85610 PROTHROMBIN TIME: CPT | Performed by: NURSE PRACTITIONER

## 2021-01-12 PROCEDURE — 94002 VENT MGMT INPAT INIT DAY: CPT

## 2021-01-12 PROCEDURE — NC001 PR NO CHARGE: Performed by: PHYSICIAN ASSISTANT

## 2021-01-12 PROCEDURE — 82803 BLOOD GASES ANY COMBINATION: CPT

## 2021-01-12 PROCEDURE — 36620 INSERTION CATHETER ARTERY: CPT

## 2021-01-12 PROCEDURE — C9113 INJ PANTOPRAZOLE SODIUM, VIA: HCPCS | Performed by: PHYSICIAN ASSISTANT

## 2021-01-12 PROCEDURE — 93970 EXTREMITY STUDY: CPT

## 2021-01-12 PROCEDURE — 93325 DOPPLER ECHO COLOR FLOW MAPG: CPT | Performed by: INTERNAL MEDICINE

## 2021-01-12 PROCEDURE — 0BH17EZ INSERTION OF ENDOTRACHEAL AIRWAY INTO TRACHEA, VIA NATURAL OR ARTIFICIAL OPENING: ICD-10-PCS | Performed by: INTERNAL MEDICINE

## 2021-01-12 PROCEDURE — 84132 ASSAY OF SERUM POTASSIUM: CPT

## 2021-01-12 PROCEDURE — 94150 VITAL CAPACITY TEST: CPT

## 2021-01-12 PROCEDURE — 99291 CRITICAL CARE FIRST HOUR: CPT | Performed by: PHYSICIAN ASSISTANT

## 2021-01-12 PROCEDURE — 99222 1ST HOSP IP/OBS MODERATE 55: CPT | Performed by: NURSE PRACTITIONER

## 2021-01-12 PROCEDURE — 85018 HEMOGLOBIN: CPT | Performed by: NURSE PRACTITIONER

## 2021-01-12 PROCEDURE — 99292 CRITICAL CARE ADDL 30 MIN: CPT | Performed by: INTERNAL MEDICINE

## 2021-01-12 PROCEDURE — 87449 NOS EACH ORGANISM AG IA: CPT | Performed by: PHYSICIAN ASSISTANT

## 2021-01-12 PROCEDURE — 99223 1ST HOSP IP/OBS HIGH 75: CPT | Performed by: INTERNAL MEDICINE

## 2021-01-12 PROCEDURE — 83605 ASSAY OF LACTIC ACID: CPT | Performed by: PSYCHIATRY & NEUROLOGY

## 2021-01-12 PROCEDURE — 0T9B80Z DRAINAGE OF BLADDER WITH DRAINAGE DEVICE, VIA NATURAL OR ARTIFICIAL OPENING ENDOSCOPIC: ICD-10-PCS | Performed by: UROLOGY

## 2021-01-12 PROCEDURE — 99292 CRITICAL CARE ADDL 30 MIN: CPT | Performed by: PHYSICIAN ASSISTANT

## 2021-01-12 PROCEDURE — 5A1945Z RESPIRATORY VENTILATION, 24-96 CONSECUTIVE HOURS: ICD-10-PCS | Performed by: INTERNAL MEDICINE

## 2021-01-12 PROCEDURE — 82728 ASSAY OF FERRITIN: CPT | Performed by: INTERNAL MEDICINE

## 2021-01-12 PROCEDURE — 86900 BLOOD TYPING SEROLOGIC ABO: CPT | Performed by: NURSE PRACTITIONER

## 2021-01-12 PROCEDURE — 94003 VENT MGMT INPAT SUBQ DAY: CPT

## 2021-01-12 PROCEDURE — 85014 HEMATOCRIT: CPT

## 2021-01-12 PROCEDURE — 85027 COMPLETE CBC AUTOMATED: CPT | Performed by: NURSE PRACTITIONER

## 2021-01-12 PROCEDURE — 94760 N-INVAS EAR/PLS OXIMETRY 1: CPT

## 2021-01-12 PROCEDURE — 82947 ASSAY GLUCOSE BLOOD QUANT: CPT

## 2021-01-12 PROCEDURE — 86901 BLOOD TYPING SEROLOGIC RH(D): CPT | Performed by: NURSE PRACTITIONER

## 2021-01-12 PROCEDURE — 84478 ASSAY OF TRIGLYCERIDES: CPT | Performed by: PHYSICIAN ASSISTANT

## 2021-01-12 PROCEDURE — 93010 ELECTROCARDIOGRAM REPORT: CPT | Performed by: INTERNAL MEDICINE

## 2021-01-12 PROCEDURE — 85730 THROMBOPLASTIN TIME PARTIAL: CPT | Performed by: NURSE PRACTITIONER

## 2021-01-12 PROCEDURE — 85610 PROTHROMBIN TIME: CPT | Performed by: PHYSICIAN ASSISTANT

## 2021-01-12 PROCEDURE — 4A133B1 MONITORING OF ARTERIAL PRESSURE, PERIPHERAL, PERCUTANEOUS APPROACH: ICD-10-PCS | Performed by: INTERNAL MEDICINE

## 2021-01-12 PROCEDURE — 82140 ASSAY OF AMMONIA: CPT | Performed by: NURSE PRACTITIONER

## 2021-01-12 PROCEDURE — 94664 DEMO&/EVAL PT USE INHALER: CPT

## 2021-01-12 PROCEDURE — 86850 RBC ANTIBODY SCREEN: CPT | Performed by: NURSE PRACTITIONER

## 2021-01-12 PROCEDURE — 51703 INSERT BLADDER CATH COMPLEX: CPT | Performed by: NURSE PRACTITIONER

## 2021-01-12 PROCEDURE — 94640 AIRWAY INHALATION TREATMENT: CPT

## 2021-01-12 PROCEDURE — 80048 BASIC METABOLIC PNL TOTAL CA: CPT | Performed by: PHYSICIAN ASSISTANT

## 2021-01-12 PROCEDURE — 74177 CT ABD & PELVIS W/CONTRAST: CPT

## 2021-01-12 PROCEDURE — 36556 INSERT NON-TUNNEL CV CATH: CPT | Performed by: INTERNAL MEDICINE

## 2021-01-12 PROCEDURE — 03HY32Z INSERTION OF MONITORING DEVICE INTO UPPER ARTERY, PERCUTANEOUS APPROACH: ICD-10-PCS | Performed by: INTERNAL MEDICINE

## 2021-01-12 PROCEDURE — 93308 TTE F-UP OR LMTD: CPT | Performed by: INTERNAL MEDICINE

## 2021-01-12 PROCEDURE — 02HV33Z INSERTION OF INFUSION DEVICE INTO SUPERIOR VENA CAVA, PERCUTANEOUS APPROACH: ICD-10-PCS | Performed by: INTERNAL MEDICINE

## 2021-01-12 PROCEDURE — 36600 WITHDRAWAL OF ARTERIAL BLOOD: CPT

## 2021-01-12 PROCEDURE — 94762 N-INVAS EAR/PLS OXIMTRY CONT: CPT

## 2021-01-12 PROCEDURE — 82805 BLOOD GASES W/O2 SATURATION: CPT | Performed by: PHYSICIAN ASSISTANT

## 2021-01-12 PROCEDURE — 85730 THROMBOPLASTIN TIME PARTIAL: CPT | Performed by: PHYSICIAN ASSISTANT

## 2021-01-12 PROCEDURE — 4A133J1 MONITORING OF ARTERIAL PULSE, PERIPHERAL, PERCUTANEOUS APPROACH: ICD-10-PCS | Performed by: INTERNAL MEDICINE

## 2021-01-12 PROCEDURE — 86140 C-REACTIVE PROTEIN: CPT | Performed by: INTERNAL MEDICINE

## 2021-01-12 PROCEDURE — 82805 BLOOD GASES W/O2 SATURATION: CPT | Performed by: NURSE PRACTITIONER

## 2021-01-12 PROCEDURE — 71275 CT ANGIOGRAPHY CHEST: CPT

## 2021-01-12 PROCEDURE — 83735 ASSAY OF MAGNESIUM: CPT | Performed by: PHYSICIAN ASSISTANT

## 2021-01-12 PROCEDURE — 84100 ASSAY OF PHOSPHORUS: CPT | Performed by: PHYSICIAN ASSISTANT

## 2021-01-12 PROCEDURE — 93321 DOPPLER ECHO F-UP/LMTD STD: CPT | Performed by: INTERNAL MEDICINE

## 2021-01-12 PROCEDURE — G1004 CDSM NDSC: HCPCS

## 2021-01-12 PROCEDURE — 83605 ASSAY OF LACTIC ACID: CPT | Performed by: INTERNAL MEDICINE

## 2021-01-12 PROCEDURE — 84295 ASSAY OF SERUM SODIUM: CPT

## 2021-01-12 PROCEDURE — 84145 PROCALCITONIN (PCT): CPT | Performed by: PHYSICIAN ASSISTANT

## 2021-01-12 PROCEDURE — 31500 INSERT EMERGENCY AIRWAY: CPT | Performed by: PHYSICIAN ASSISTANT

## 2021-01-12 RX ORDER — FENTANYL CITRATE-0.9 % NACL/PF 10 MCG/ML
100 PLASTIC BAG, INJECTION (ML) INTRAVENOUS CONTINUOUS
Status: CANCELLED | OUTPATIENT
Start: 2021-01-12

## 2021-01-12 RX ORDER — FENTANYL CITRATE 50 UG/ML
50 INJECTION, SOLUTION INTRAMUSCULAR; INTRAVENOUS EVERY 2 HOUR PRN
Status: DISCONTINUED | OUTPATIENT
Start: 2021-01-12 | End: 2021-01-13 | Stop reason: HOSPADM

## 2021-01-12 RX ORDER — HEPARIN SODIUM 1000 [USP'U]/ML
2800 INJECTION, SOLUTION INTRAVENOUS; SUBCUTANEOUS
Status: DISCONTINUED | OUTPATIENT
Start: 2021-01-12 | End: 2021-01-12

## 2021-01-12 RX ORDER — CALCIUM GLUCONATE 20 MG/ML
1 INJECTION, SOLUTION INTRAVENOUS ONCE
Status: COMPLETED | OUTPATIENT
Start: 2021-01-12 | End: 2021-01-12

## 2021-01-12 RX ORDER — POTASSIUM CHLORIDE 14.9 MG/ML
20 INJECTION INTRAVENOUS ONCE
Status: COMPLETED | OUTPATIENT
Start: 2021-01-12 | End: 2021-01-12

## 2021-01-12 RX ORDER — LEVALBUTEROL 1.25 MG/.5ML
1.25 SOLUTION, CONCENTRATE RESPIRATORY (INHALATION) EVERY 6 HOURS
Status: DISCONTINUED | OUTPATIENT
Start: 2021-01-12 | End: 2021-01-13 | Stop reason: HOSPADM

## 2021-01-12 RX ORDER — FENTANYL CITRATE 50 UG/ML
INJECTION, SOLUTION INTRAMUSCULAR; INTRAVENOUS
Status: DISPENSED
Start: 2021-01-12 | End: 2021-01-12

## 2021-01-12 RX ORDER — FENTANYL CITRATE-0.9 % NACL/PF 10 MCG/ML
PLASTIC BAG, INJECTION (ML) INTRAVENOUS
Status: DISPENSED
Start: 2021-01-12 | End: 2021-01-12

## 2021-01-12 RX ORDER — HEPARIN SODIUM 10000 [USP'U]/100ML
3-30 INJECTION, SOLUTION INTRAVENOUS
Status: DISCONTINUED | OUTPATIENT
Start: 2021-01-12 | End: 2021-01-13 | Stop reason: HOSPADM

## 2021-01-12 RX ORDER — HEPARIN SODIUM 1000 [USP'U]/ML
5600 INJECTION, SOLUTION INTRAVENOUS; SUBCUTANEOUS
Status: DISCONTINUED | OUTPATIENT
Start: 2021-01-12 | End: 2021-01-13 | Stop reason: HOSPADM

## 2021-01-12 RX ORDER — SODIUM CHLORIDE, SODIUM GLUCONATE, SODIUM ACETATE, POTASSIUM CHLORIDE, MAGNESIUM CHLORIDE, SODIUM PHOSPHATE, DIBASIC, AND POTASSIUM PHOSPHATE .53; .5; .37; .037; .03; .012; .00082 G/100ML; G/100ML; G/100ML; G/100ML; G/100ML; G/100ML; G/100ML
500 INJECTION, SOLUTION INTRAVENOUS ONCE
Status: COMPLETED | OUTPATIENT
Start: 2021-01-12 | End: 2021-01-12

## 2021-01-12 RX ORDER — SODIUM CHLORIDE 30 MG/ML INHALATION SOLUTION 30 MG/ML
4 SOLUTION INHALANT
Status: DISCONTINUED | OUTPATIENT
Start: 2021-01-12 | End: 2021-01-13 | Stop reason: HOSPADM

## 2021-01-12 RX ORDER — HEPARIN SODIUM 10000 [USP'U]/100ML
3-30 INJECTION, SOLUTION INTRAVENOUS
Status: DISCONTINUED | OUTPATIENT
Start: 2021-01-12 | End: 2021-01-12

## 2021-01-12 RX ORDER — POTASSIUM CHLORIDE 20 MEQ/1
20 TABLET, EXTENDED RELEASE ORAL ONCE
Status: COMPLETED | OUTPATIENT
Start: 2021-01-12 | End: 2021-01-13

## 2021-01-12 RX ORDER — DEXTROSE MONOHYDRATE 50 MG/ML
125 INJECTION, SOLUTION INTRAVENOUS CONTINUOUS
Status: DISCONTINUED | OUTPATIENT
Start: 2021-01-12 | End: 2021-01-13

## 2021-01-12 RX ORDER — POTASSIUM CHLORIDE 14.9 MG/ML
20 INJECTION INTRAVENOUS ONCE
Status: COMPLETED | OUTPATIENT
Start: 2021-01-12 | End: 2021-01-13

## 2021-01-12 RX ORDER — HEPARIN SODIUM 1000 [USP'U]/ML
5600 INJECTION, SOLUTION INTRAVENOUS; SUBCUTANEOUS
Status: DISCONTINUED | OUTPATIENT
Start: 2021-01-12 | End: 2021-01-12

## 2021-01-12 RX ORDER — HEPARIN SODIUM 1000 [USP'U]/ML
2800 INJECTION, SOLUTION INTRAVENOUS; SUBCUTANEOUS
Status: DISCONTINUED | OUTPATIENT
Start: 2021-01-12 | End: 2021-01-13 | Stop reason: HOSPADM

## 2021-01-12 RX ORDER — DEXMEDETOMIDINE 100 UG/ML
.1-.7 INJECTION, SOLUTION, CONCENTRATE INTRAVENOUS
Status: DISCONTINUED | OUTPATIENT
Start: 2021-01-12 | End: 2021-01-13 | Stop reason: HOSPADM

## 2021-01-12 RX ORDER — LIDOCAINE HYDROCHLORIDE 20 MG/ML
1 JELLY TOPICAL ONCE
Status: COMPLETED | OUTPATIENT
Start: 2021-01-12 | End: 2021-01-11

## 2021-01-12 RX ORDER — FENTANYL CITRATE-0.9 % NACL/PF 10 MCG/ML
100 PLASTIC BAG, INJECTION (ML) INTRAVENOUS CONTINUOUS
Status: DISCONTINUED | OUTPATIENT
Start: 2021-01-12 | End: 2021-01-13 | Stop reason: HOSPADM

## 2021-01-12 RX ORDER — LORAZEPAM 2 MG/ML
0.5 INJECTION INTRAMUSCULAR ONCE
Status: COMPLETED | OUTPATIENT
Start: 2021-01-12 | End: 2021-01-12

## 2021-01-12 RX ORDER — FENTANYL CITRATE 50 UG/ML
100 INJECTION, SOLUTION INTRAMUSCULAR; INTRAVENOUS ONCE
Status: COMPLETED | OUTPATIENT
Start: 2021-01-12 | End: 2021-01-12

## 2021-01-12 RX ORDER — POTASSIUM CHLORIDE 14.9 MG/ML
20 INJECTION INTRAVENOUS
Status: COMPLETED | OUTPATIENT
Start: 2021-01-12 | End: 2021-01-12

## 2021-01-12 RX ORDER — DEXAMETHASONE SODIUM PHOSPHATE 10 MG/ML
0.1 INJECTION, SOLUTION INTRAMUSCULAR; INTRAVENOUS EVERY 12 HOURS
Status: DISCONTINUED | OUTPATIENT
Start: 2021-01-12 | End: 2021-01-13 | Stop reason: HOSPADM

## 2021-01-12 RX ORDER — SODIUM CHLORIDE, SODIUM GLUCONATE, SODIUM ACETATE, POTASSIUM CHLORIDE, MAGNESIUM CHLORIDE, SODIUM PHOSPHATE, DIBASIC, AND POTASSIUM PHOSPHATE .53; .5; .37; .037; .03; .012; .00082 G/100ML; G/100ML; G/100ML; G/100ML; G/100ML; G/100ML; G/100ML
1000 INJECTION, SOLUTION INTRAVENOUS ONCE
Status: COMPLETED | OUTPATIENT
Start: 2021-01-12 | End: 2021-01-12

## 2021-01-12 RX ORDER — DEXTROSE MONOHYDRATE 50 MG/ML
75 INJECTION, SOLUTION INTRAVENOUS CONTINUOUS
Status: DISCONTINUED | OUTPATIENT
Start: 2021-01-12 | End: 2021-01-12

## 2021-01-12 RX ORDER — MAGNESIUM SULFATE HEPTAHYDRATE 40 MG/ML
2 INJECTION, SOLUTION INTRAVENOUS ONCE
Status: COMPLETED | OUTPATIENT
Start: 2021-01-12 | End: 2021-01-12

## 2021-01-12 RX ORDER — DEXTROSE MONOHYDRATE 25 G/50ML
50 INJECTION, SOLUTION INTRAVENOUS ONCE
Status: COMPLETED | OUTPATIENT
Start: 2021-01-12 | End: 2021-01-12

## 2021-01-12 RX ORDER — HYDROMORPHONE HCL/PF 1 MG/ML
0.5 SYRINGE (ML) INJECTION ONCE
Status: COMPLETED | OUTPATIENT
Start: 2021-01-12 | End: 2021-01-12

## 2021-01-12 RX ORDER — PROPOFOL 10 MG/ML
5-50 INJECTION, EMULSION INTRAVENOUS
Status: DISCONTINUED | OUTPATIENT
Start: 2021-01-12 | End: 2021-01-13 | Stop reason: HOSPADM

## 2021-01-12 RX ORDER — CHLORHEXIDINE GLUCONATE 0.12 MG/ML
15 RINSE ORAL EVERY 12 HOURS SCHEDULED
Status: DISCONTINUED | OUTPATIENT
Start: 2021-01-12 | End: 2021-01-13 | Stop reason: HOSPADM

## 2021-01-12 RX ADMIN — SODIUM PHOSPHATE, MONOBASIC, MONOHYDRATE 12 MMOL: 276; 142 INJECTION, SOLUTION INTRAVENOUS at 23:52

## 2021-01-12 RX ADMIN — CALCIUM GLUCONATE 1 G: 20 INJECTION, SOLUTION INTRAVENOUS at 15:38

## 2021-01-12 RX ADMIN — PROPOFOL 35 MCG/KG/MIN: 10 INJECTION, EMULSION INTRAVENOUS at 14:36

## 2021-01-12 RX ADMIN — SODIUM CHLORIDE, SODIUM GLUCONATE, SODIUM ACETATE, POTASSIUM CHLORIDE AND MAGNESIUM CHLORIDE 500 ML: 526; 502; 368; 37; 30 INJECTION, SOLUTION INTRAVENOUS at 16:00

## 2021-01-12 RX ADMIN — SODIUM CHLORIDE SOLN NEBU 3% 4 ML: 3 NEBU SOLN at 20:49

## 2021-01-12 RX ADMIN — IOHEXOL 100 ML: 350 INJECTION, SOLUTION INTRAVENOUS at 07:38

## 2021-01-12 RX ADMIN — SODIUM CHLORIDE 0.5 UNITS/HR: 9 INJECTION, SOLUTION INTRAVENOUS at 13:59

## 2021-01-12 RX ADMIN — LEVALBUTEROL HYDROCHLORIDE 1.25 MG: 1.25 SOLUTION, CONCENTRATE RESPIRATORY (INHALATION) at 14:39

## 2021-01-12 RX ADMIN — INSULIN HUMAN 10 UNITS: 100 INJECTION, SOLUTION PARENTERAL at 15:38

## 2021-01-12 RX ADMIN — SODIUM CHLORIDE 8.1 UNITS/HR: 9 INJECTION, SOLUTION INTRAVENOUS at 03:56

## 2021-01-12 RX ADMIN — DEXTROSE 75 ML/HR: 5 SOLUTION INTRAVENOUS at 04:16

## 2021-01-12 RX ADMIN — Medication 2000 UNITS: at 09:46

## 2021-01-12 RX ADMIN — SODIUM CHLORIDE, SODIUM GLUCONATE, SODIUM ACETATE, POTASSIUM CHLORIDE AND MAGNESIUM CHLORIDE 500 ML: 526; 502; 368; 37; 30 INJECTION, SOLUTION INTRAVENOUS at 18:33

## 2021-01-12 RX ADMIN — POTASSIUM CHLORIDE 20 MEQ: 14.9 INJECTION, SOLUTION INTRAVENOUS at 06:47

## 2021-01-12 RX ADMIN — ZINC SULFATE 220 MG (50 MG) CAPSULE 220 MG: CAPSULE at 09:46

## 2021-01-12 RX ADMIN — CHLORHEXIDINE GLUCONATE 0.12% ORAL RINSE 15 ML: 1.2 LIQUID ORAL at 09:46

## 2021-01-12 RX ADMIN — DOXYCYCLINE 100 MG: 100 INJECTION, POWDER, LYOPHILIZED, FOR SOLUTION INTRAVENOUS at 20:20

## 2021-01-12 RX ADMIN — POTASSIUM PHOSPHATE, MONOBASIC AND POTASSIUM PHOSPHATE, DIBASIC 30 MMOL: 224; 236 INJECTION, SOLUTION, CONCENTRATE INTRAVENOUS at 06:46

## 2021-01-12 RX ADMIN — FENTANYL CITRATE 100 MCG: 50 INJECTION INTRAMUSCULAR; INTRAVENOUS at 08:45

## 2021-01-12 RX ADMIN — PROPOFOL 45 MCG/KG/MIN: 10 INJECTION, EMULSION INTRAVENOUS at 09:51

## 2021-01-12 RX ADMIN — LEVALBUTEROL HYDROCHLORIDE 1.25 MG: 1.25 SOLUTION, CONCENTRATE RESPIRATORY (INHALATION) at 08:14

## 2021-01-12 RX ADMIN — MAGNESIUM SULFATE HEPTAHYDRATE 2 G: 40 INJECTION, SOLUTION INTRAVENOUS at 06:48

## 2021-01-12 RX ADMIN — DEXAMETHASONE SODIUM PHOSPHATE 7.3 MG: 10 INJECTION, SOLUTION INTRAMUSCULAR; INTRAVENOUS at 09:52

## 2021-01-12 RX ADMIN — LORAZEPAM 0.5 MG: 2 INJECTION INTRAMUSCULAR; INTRAVENOUS at 04:18

## 2021-01-12 RX ADMIN — DEXTROSE MONOHYDRATE 50 ML: 25 INJECTION, SOLUTION INTRAVENOUS at 15:38

## 2021-01-12 RX ADMIN — POTASSIUM CHLORIDE 20 MEQ: 14.9 INJECTION, SOLUTION INTRAVENOUS at 04:47

## 2021-01-12 RX ADMIN — PANTOPRAZOLE SODIUM 8 MG/HR: 40 INJECTION, POWDER, FOR SOLUTION INTRAVENOUS at 17:12

## 2021-01-12 RX ADMIN — OXYCODONE HYDROCHLORIDE AND ACETAMINOPHEN 1000 MG: 500 TABLET ORAL at 09:46

## 2021-01-12 RX ADMIN — SODIUM CHLORIDE, SODIUM GLUCONATE, SODIUM ACETATE, POTASSIUM CHLORIDE AND MAGNESIUM CHLORIDE 1000 ML: 526; 502; 368; 37; 30 INJECTION, SOLUTION INTRAVENOUS at 13:32

## 2021-01-12 RX ADMIN — SODIUM CHLORIDE 0.7 MCG/KG/HR: 9 INJECTION, SOLUTION INTRAVENOUS at 20:19

## 2021-01-12 RX ADMIN — SODIUM CHLORIDE SOLN NEBU 3% 4 ML: 3 NEBU SOLN at 14:39

## 2021-01-12 RX ADMIN — SODIUM CHLORIDE 4 UNITS/HR: 9 INJECTION, SOLUTION INTRAVENOUS at 20:20

## 2021-01-12 RX ADMIN — PANTOPRAZOLE SODIUM 80 MG: 40 INJECTION, POWDER, FOR SOLUTION INTRAVENOUS at 06:45

## 2021-01-12 RX ADMIN — SODIUM CHLORIDE 0.3 MCG/KG/HR: 9 INJECTION, SOLUTION INTRAVENOUS at 03:29

## 2021-01-12 RX ADMIN — SODIUM CHLORIDE SOLN NEBU 3% 4 ML: 3 NEBU SOLN at 08:14

## 2021-01-12 RX ADMIN — LEVALBUTEROL HYDROCHLORIDE 1.25 MG: 1.25 SOLUTION, CONCENTRATE RESPIRATORY (INHALATION) at 20:49

## 2021-01-12 RX ADMIN — FENTANYL CITRATE 50 MCG: 50 INJECTION INTRAMUSCULAR; INTRAVENOUS at 05:20

## 2021-01-12 RX ADMIN — CEFTRIAXONE SODIUM 1000 MG: 10 INJECTION, POWDER, FOR SOLUTION INTRAVENOUS at 09:48

## 2021-01-12 RX ADMIN — PROPOFOL 25 MCG/KG/MIN: 10 INJECTION, EMULSION INTRAVENOUS at 22:47

## 2021-01-12 RX ADMIN — POTASSIUM CHLORIDE 20 MEQ: 14.9 INJECTION, SOLUTION INTRAVENOUS at 23:51

## 2021-01-12 RX ADMIN — HEPARIN SODIUM 18 UNITS/KG/HR: 10000 INJECTION, SOLUTION INTRAVENOUS at 18:33

## 2021-01-12 RX ADMIN — PROPOFOL 10 MCG/KG/MIN: 10 INJECTION, EMULSION INTRAVENOUS at 05:45

## 2021-01-12 RX ADMIN — FENTANYL CITRATE 50 MCG: 50 INJECTION INTRAMUSCULAR; INTRAVENOUS at 05:27

## 2021-01-12 RX ADMIN — DOXYCYCLINE 100 MG: 100 INJECTION, POWDER, LYOPHILIZED, FOR SOLUTION INTRAVENOUS at 09:49

## 2021-01-12 RX ADMIN — HEPARIN SODIUM 5000 UNITS: 5000 INJECTION, SOLUTION INTRAVENOUS; SUBCUTANEOUS at 00:00

## 2021-01-12 RX ADMIN — SODIUM CHLORIDE 0.7 MCG/KG/HR: 9 INJECTION, SOLUTION INTRAVENOUS at 09:46

## 2021-01-12 RX ADMIN — Medication 100 MCG/HR: at 14:00

## 2021-01-12 RX ADMIN — ATORVASTATIN CALCIUM 40 MG: 40 TABLET, FILM COATED ORAL at 22:46

## 2021-01-12 RX ADMIN — SODIUM CHLORIDE, SODIUM LACTATE, POTASSIUM CHLORIDE, AND CALCIUM CHLORIDE 500 ML: .6; .31; .03; .02 INJECTION, SOLUTION INTRAVENOUS at 01:31

## 2021-01-12 RX ADMIN — DEXAMETHASONE SODIUM PHOSPHATE 7.3 MG: 10 INJECTION, SOLUTION INTRAMUSCULAR; INTRAVENOUS at 20:20

## 2021-01-12 RX ADMIN — HYDROMORPHONE HYDROCHLORIDE 0.5 MG: 1 INJECTION, SOLUTION INTRAMUSCULAR; INTRAVENOUS; SUBCUTANEOUS at 03:18

## 2021-01-12 RX ADMIN — Medication 50 MCG/HR: at 05:43

## 2021-01-12 RX ADMIN — OXYCODONE HYDROCHLORIDE AND ACETAMINOPHEN 1000 MG: 500 TABLET ORAL at 20:21

## 2021-01-12 RX ADMIN — PANTOPRAZOLE SODIUM 8 MG/HR: 40 INJECTION, POWDER, FOR SOLUTION INTRAVENOUS at 07:01

## 2021-01-12 RX ADMIN — FENTANYL CITRATE 100 MCG: 50 INJECTION INTRAMUSCULAR; INTRAVENOUS at 05:41

## 2021-01-12 RX ADMIN — CHLORHEXIDINE GLUCONATE 0.12% ORAL RINSE 15 ML: 1.2 LIQUID ORAL at 20:21

## 2021-01-12 RX ADMIN — HEPARIN SODIUM 18 UNITS/KG/HR: 10000 INJECTION, SOLUTION INTRAVENOUS at 03:55

## 2021-01-12 RX ADMIN — FENTANYL CITRATE 50 MCG: 50 INJECTION INTRAMUSCULAR; INTRAVENOUS at 07:51

## 2021-01-12 RX ADMIN — POTASSIUM CHLORIDE 20 MEQ: 14.9 INJECTION, SOLUTION INTRAVENOUS at 11:18

## 2021-01-12 NOTE — ASSESSMENT & PLAN NOTE
· Patient was diagnosed with COVID on 12/31/2021  · Patient initially did not require O2 supplementation and was discharged home  · Multiple ER visits secondary to Nausea/Vomiting, still not noted to be Hypoxic and d/c home  · Upon presentation today to [facility] with Nausea/Vomiting, as well as shortness of breath, noted to be Hypoxic and started on Vapotherm 85/30 - also found to be in DKA  · Patient 12 days out from initial diagnosis and had not remdesivir therapy  · Patient received 125 mg of Solu-Medrol in the ER today, will continue dexamethasone 0 1mg/kg q12h x10 days  · Will place on the Severe COVID pathway  · Check Blood Type  · Monitor CBC / CMP daily  · Obtain 12 Lead EKG  · Troponin at outside hospital Negative  · Obtain CK as well as NT Bnp  · Was initiated on Cefepime and Vancomycin; however no recent hospital admission although ER visits  · Will change to Doxycycline and Ceftriaxone   · Initial Procalcitonin 0 31, continue to trend  · Will repeat Blood cultures  · Urine Strep / Legionella pending  · Follow-up MRSA screen  · Vit D3 / Vit C and Zinc if able to tolerate PO  · Atorvastatin 40mg QHS  · Famotidine 20mg BID  · Check Chronic Hepatitis and Rapid HIV  · STAT Head CT obtained without acute findings  · CRP 80 / Ferritin 1854 / DDimer 8 30  · Initially in setting of gross hematuria, patient only placed on SQ Heparin; however post urologic intervention / rogers placement, urine only slightly red tinged now  Risk vs Benefit of Systemic anticoagulation weighed, will start Heparin VTE high infusion holding initial loading bolus; however allowing therapeutic reboluses    Continue to monitor Hgb / Plt as well as for hematuria to return  · Consider Actemra

## 2021-01-12 NOTE — ASSESSMENT & PLAN NOTE
· Suspect Secondary to profound dehydration  · Most recent Cr 0 96 today 1 43  · Will continue IV fluids as well as fluid boluses  · Will monitor volume status closely as also COVID positive  · Noted significant hematuria upon arrival here at [facility]  · Maintain rogers  · Irrigate

## 2021-01-12 NOTE — UTILIZATION REVIEW
Initial Clinical Review -  Patient information entered into [employer], attempted to submit clinical, received the following message: You are not authorized to access the requested content  Please use SHRUTI to request access  Clinical faxed  Admission: Date/Time/Statement:   Admission Orders (From admission, onward)     Ordered        01/11/21 1226  Inpatient Admission  Once                   Orders Placed This Encounter   Procedures    Inpatient Admission     Standing Status:   Standing     Number of Occurrences:   1     Order Specific Question:   Admitting Physician     Answer:   Phani Blanchard     Order Specific Question:   Level of Care     Answer:   Critical Care [15]     Order Specific Question:   Estimated length of stay     Answer:   More than 2 Midnights     Order Specific Question:   Certification     Answer:   I certify that inpatient services are medically necessary for this patient for a duration of greater than two midnights  See H&P and MD Progress Notes for additional information about the patient's course of treatment  ED Arrival Information     Expected Arrival Acuity Means of Arrival Escorted By Service Admission Type    - 1/11/2021 09:48 Emergent Ambulance [facility] General Medicine Emergency    Arrival Complaint    weakness        Chief Complaint   Patient presents with    Wheezing    Shortness of Breath    Weakness - Generalized           HPI:     56 y o  with PMH of  DM2 male who present to the ED from home with shortness of breath  Unable to get any history from patient as he is unable to speak due to what family states is a severe sore throat  Patient is following commands and does nod his head yes and no to questions  Patient was diagnosed with COVID on 12/31/2020 however was not requiring oxygen and was treating at home  Over the last week he has had nausea/vomiting and came to the ER and was discharged with prescription for Zofran    Over the last 2 days per family patient has had worsening sore throat and poor p o  Intake  In the ER today patient was found to be hypoxic and in DKA  CXR showed worsening ground glass opacities  Patient placed on oxygen and started on DKA protocol  Received bicarb in the ED due to significant metabolic acidosis  Physical exam: Awake and alert, not speaking  Follows commands  Decreased breath sounds B/L with tachypnea, trace B/L LE edema  Plan: Inpatient Critical Care admission for evaluation and treatment of Sepsis due to COVID 19, acute respiratory failure with hypoxia, acute metabolic encephalopathy, DKA and ANNALEE:  IV antibiotics, trend procalcitonin, check strep pneumo/urine Legionella  Continue IV dexamethasone  Continue oxygen, titrate as tolerated  DKA protocol, IV fluids, replace electrolytes, NPO      1/11 Internal Medicine: Patient with increasing oxygen requirement, currently on 100% FiO2, saturating 90%  Due to increasing oxygen requirements Critical Care recommended transferring for higher level of care  Continue DKA protocol, adjust fluids to 1/2 NSS  At this time patient is awake and following simple commands, moving all four extremities  1/11/21 Transferred from [place] [address] ICU to [facility] by helicopter transport  @ 1800         ED Triage Vitals   Temperature Pulse Respirations Blood Pressure SpO2   01/11/21 0958 01/11/21 0958 01/11/21 0958 01/11/21 0958 01/11/21 0958   (!) 96 8 °F (36 °C) (!) 109 (!) 28 143/81 (!) 84 % Room air   4          Wt Readings from Last 1 Encounters:   01/11/21 73 2 kg (161 lb 6 oz)     Additional Vital Signs:     Date/Time  Temp  Pulse  Resp  BP  MAP (mmHg)  SpO2  FiO2 (%)  O2 Flow Rate (L/min)  O2 Device  O2 Interface Device   01/11/21 1730    101  52Abnormal   150/92  115  94 %  100  40 L/min  High flow nasal cannula     01/11/21 1715    99  51Abnormal   149/88  112  94 %  100  40 L/min  High flow nasal cannula     01/11/21 1700  95 4 °F (35 2 °C)Abnormal    100 50Abnormal   148/89  114  95 %  100  40 L/min  High flow nasal cannula     Temp: on rema lowechastity at 01/11/21 1700   01/11/21 1645    100  49Abnormal   144/89  110  90 %  100  40 L/min  High flow nasal cannula     01/11/21 1644            84 %Abnormal   80    40 L/min  High flow nasal cannula   HFNC prongs   FiO2 (%): FiO2 increased to 100% @ this time  at 01/11/21 1644   O2 Device: Vapotherm at 01/11/21 1644   01/11/21 1630  95 8 °F (35 4 °C)Abnormal    111Abnormal   49Abnormal   152/88  115  83 %Abnormal   80  40 L/min  High flow nasal cannula     Temp: rema leong on at 01/11/21 1630   01/11/21 1615    120Abnormal   48Abnormal   137/96  112  90 %  80  40 L/min  High flow nasal cannula     01/11/21 1604            90 %  80  40 L/min  High flow nasal cannula  HFNC prongs    O2 Interface Device: Vapotherm at 01/11/21 1604   01/11/21 1600    101  50Abnormal   155/93  115  89 %Abnormal   80  40 L/min  High flow nasal cannula     01/11/21 1545  95 5 °F (35 3 °C)Abnormal    104  46Abnormal   145/88  112  90 %  80  40 L/min  High flow nasal cannula     Temp: rema leong ap-plied at 01/11/21 1545   01/11/21 1530    108Abnormal   51Abnormal   145/90  113  88 %Abnormal   80  40 L/min  High flow nasal cannula     01/11/21 1515    107Abnormal   50Abnormal   148/86  113  86 %Abnormal   80  40 L/min  High flow nasal cannula     01/11/21 1500    114Abnormal         88 %Abnormal     80  40 L/min  High flow nasal cannula   HFNC prongs    SpO2: Pt  keeps pulling HFNC out of his nares   at 01/11/21 1500   O2 Device: Vapotherm at 01/11/21 1500   O2 Interface Device: Vapotherm at 01/11/21 1500   01/11/21 1445        165/79  114             01/11/21 1430    110Abnormal   54Abnormal       92 %           01/11/21 1415    125Abnormal   49Abnormal       91 %           01/11/21 1400    104  50Abnormal   139/83  108  93 %           01/11/21 1330        154/87  114           01/11/21 1315    109Abnormal   49Abnormal       92 %           01/11/21 1307            89 %Abnormal    80  40 L/min  High flow nasal cannula   HFNC prongs   SpO2: 89-90% at 01/11/21 1307   O2 Device: Vapotherm at 01/11/21 1307   01/11/21 1301                        O2 Interface Device: Vapotherm at 01/11/21 1301   01/11/21 1300    109Abnormal   47Abnormal   140/76  103  83 %Abnormal            01/11/21 1230    114Abnormal   48Abnormal   137/76  100  82 %Abnormal            01/11/21 1200    112Abnormal   45Abnormal   121/71  92  89 %Abnormal            01/11/21 1130    109Abnormal   49Abnormal   140/87  105  85 %Abnormal            01/11/21 1100    110Abnormal   45Abnormal   144/76  104             01/11/21 1009                  Nasal cannula         Date and Time Eye Opening Best Verbal Response Best Motor Response Meena Coma Scale Score   01/11/21 1500 4 4 6 14         Pertinent Labs/Diagnostic Test Results:     1/11 CXR:  Worsening groundglass opacities of the lungs from Covid 19 pneumonia  1/11 EKG:      Sinus tachycardia  Low voltage QRS  Nonspecific ST-t wave changes  Abnormal ECG  When compared with ECG of 06-JAN-2021 05:37,  Vent   rate has increased BY  40 BPM          Results from last 7 days   Lab Units 01/12/21  1301 01/12/21  1116 01/12/21  0831 01/12/21  0618 01/11/21  1020 01/06/21  0527   WBC Thousand/uL  --   --   --  11 76* 20 20* 3 00*   HEMOGLOBIN g/dL 11 1*  --   --  12 9 15 6 14 5   I STAT HEMOGLOBIN g/dl  --  9 5* 9 9*  --   --   --    HEMATOCRIT %  --   --   --  37 5 48 3* 42 7   HEMATOCRIT, ISTAT %  --  28* 29*  --   --   --    PLATELETS Thousands/uL  --   --   --  108* 237 79*   NEUTROS ABS Thousands/µL  --   --   --  10 78*  --   --    TOTAL NEUT ABS Thousand/uL  --   --   --   --  17 57* 2 55         Results from last 7 days   Lab Units 01/12/21  1301 01/12/21  1116 01/12/21  2840 01/12/21  0831 01/12/21  3323 01/12/21  5873 01/11/21 1922   SODIUM mmol/L 153*  --  160*  --  161* 160* 154*   POTASSIUM mmol/L 6 3*  --  4 3  --  3 8 3 5 3 6   CHLORIDE mmol/L 125*  --  129*  --  129* 126* 118*   CO2 mmol/L 19*  --  23  --  24 23 19*   CO2, I-STAT mmol/L  --  20*  --  21  --   --   --    ANION GAP mmol/L 9  --  8  --  8 11 17*   BUN mg/dL 45*  --  45*  --  45* 47* 59*   CREATININE mg/dL 0 92  --  0 96  --  1 11 1 17 1 51*   EGFR ml/min/1 73sq m 89  --  84  --  71 66 49   CALCIUM mg/dL 7 4*  --  7 7*  --  7 9* 8 1* 8 7   MAGNESIUM mg/dL 2 4  --  2 7*  --  1 9 2 0 2 2   PHOSPHORUS mg/dL 4 4*  --  2 9  --  3 5 1 2* 0 8*     Results from last 7 days   Lab Units 01/12/21  0835 01/11/21  1422 01/06/21  0527   AST U/L  --  29 58*   ALT U/L  --  25 27   ALK PHOS U/L  --  114 71   TOTAL PROTEIN g/dL  --  6 8 7 7   ALBUMIN g/dL  --  2 9* 3 5   TOTAL BILIRUBIN mg/dL  --  0 70 0 80   BILIRUBIN DIRECT mg/dL  --  0 40*  --    AMMONIA umol/L 36*  --   --      Results from last 7 days   Lab Units 01/12/21  1113 01/12/21  0958 01/12/21  0848 01/12/21  0805 01/12/21  0710 01/12/21  9367 01/12/21  0443 01/12/21  0324 01/12/21  0226 01/12/21  0034 01/11/21 2234 01/11/21 2103   POC GLUCOSE mg/dl 136 90 65 92 116 79 132 108 114 132 161* 262*     Results from last 7 days   Lab Units 01/12/21  1301 01/12/21  0835 01/12/21  0638 01/12/21  0322 01/11/21  1922 01/11/21  1422 01/11/21  1020 01/06/21  0527   GLUCOSE RANDOM mg/dL 252* 81 85 140 261* 550* 654* 279*             BETA-HYDROXYBUTYRATE   Date Value Ref Range Status   01/11/2021 6 2 (H) <0 6 mmol/L Final      Results from last 7 days   Lab Units 01/12/21  0254   PH ART  7 471*   PCO2 ART mm Hg 28 9*   PO2 ART mm Hg 75 5   HCO3 ART mmol/L 20 6*   BASE EXC ART mmol/L -1 9   O2 CONTENT ART mL/dL 17 5   O2 HGB, ARTERIAL % 94 2   ABG SOURCE  Radial, Right     Results from last 7 days   Lab Units 01/12/21  1407 01/11/21  1720 01/11/21  1151   PH REAL  7 322 7 310 7 110*   PCO2 REAL mm Hg 33 3* 23 2 22 8   PO2 REAL mm Hg 70 6* 95 0* 70 0*   HCO3 REAL mmol/L 16 8* 11 4* 6 9*   BASE EXC REAL mmol/L -8 3 -13 1 -22 7   O2 CONTENT REAL ml/dL 14 3 19 9 19 5   O2 HGB, VENOUS % 90 4* 94 9 83 5     Results from last 7 days   Lab Units 01/12/21  1116 01/12/21  0831   I STAT BASE EXC mmol/L -4* -5*   I STAT O2 SAT % 88* 92*   ISTAT PH ART  7 432 7 344*   I STAT ART PCO2 mm HG 28 9* 37 0   I STAT ART PO2 mm HG 51 0* 67 0*   I STAT ART HCO3 mmol/L 19 3* 20 2*     Results from last 7 days   Lab Units 01/11/21 1922   CK TOTAL U/L 224   CK MB INDEX % 2 8*   CK MB ng/mL 6 2*     Results from last 7 days   Lab Units 01/11/21  1020 01/06/21  0557   TROPONIN I ng/mL <0 03 <0 03     Results from last 7 days   Lab Units 01/11/21 1922   D-DIMER QUANTITATIVE ug/ml FEU 8 30*     Results from last 7 days   Lab Units 01/12/21  0327 01/11/21  1020   PROTIME seconds 18 4* 16 8*   INR  1 52* 1 38*   PTT seconds 29 26         Results from last 7 days   Lab Units 01/12/21  0638 01/11/21  1151   PROCALCITONIN ng/ml 0 65* 0 31*     Results from last 7 days   Lab Units 01/12/21  1301 01/12/21  1008 01/12/21  0322 01/12/21  0206 01/11/21  2303 01/11/21  1922 01/11/21  1224   LACTIC ACID mmol/L 2 4* 2 8* 4 6* 3 3* 4 9* 3 4* 2 5*             Results from last 7 days   Lab Units 01/11/21  1922   NT-PRO BNP pg/mL 448*     Results from last 7 days   Lab Units 01/12/21  0638 01/11/21  1922   FERRITIN ng/mL 1,716* 1,854*     Results from last 7 days   Lab Units 01/11/21 1922   HEP B S AG  Non-reactive   HEP C AB  High Reactive*   HEP B C IGM  Non-reactive   HEP B C TOTAL AB  Reactive*     Results from last 7 days   Lab Units 01/12/21  0638 01/11/21  1422   LIPASE u/L 3,951* 539*     Results from last 7 days   Lab Units 01/12/21  0638 01/11/21  1922   CRP mg/L 108 1* 80 0*         Results from last 7 days   Lab Units 01/12/21  0640 01/12/21  0206   CLARITY UA  Cloudy Cloudy   COLOR UA  Nelsy Red   SPEC GRAV UA  1 025 1 020   PH UA  6 0 6 0   GLUCOSE UA mg/dl Negative 250 (1/4%)*   KETONES UA mg/dl Negative 15 (1+)*   BLOOD UA  Large* Large*   PROTEIN UA mg/dl 100 (2+)* 100 (2+)*   NITRITE UA  Negative Negative   BILIRUBIN UA  Negative Moderate*   UROBILINOGEN UA E U /dl 1 0 0 2   LEUKOCYTES UA  Trace* Negative   WBC UA /hpf 4-10* 10-20*   RBC UA /hpf 30-50* Innumerable*   BACTERIA UA /hpf Moderate* Innumerable*   EPITHELIAL CELLS WET PREP /hpf Occasional Moderate*     Results from last 7 days   Lab Units 01/12/21  0206   STREP PNEUMONIAE ANTIGEN, URINE  Negative   LEGIONELLA URINARY ANTIGEN  Negative                             Results from last 7 days   Lab Units 01/11/21  1926 01/11/21  1020   BLOOD CULTURE  Received in Microbiology Lab  Culture in Progress  Received in Microbiology Lab  Culture in Progress  No Growth at 24 hrs  No Growth at 24 hrs       Results from last 7 days   Lab Units 01/11/21  1020 01/06/21  0527   TOTAL COUNTED  100 100           ED Treatment:   Medication Administration from 01/11/2021 0948 to 01/11/2021 1452       Date/Time Order Dose Route Action     01/11/2021 1432 sodium chloride 0 9 % bolus 1,000 mL 0 mL Intravenous Stopped     01/11/2021 1022 sodium chloride 0 9 % bolus 1,000 mL 1,000 mL Intravenous New Bag     01/11/2021 1036 methylPREDNISolone sodium succinate (Solu-MEDROL) injection 125 mg 125 mg Intravenous Given     01/11/2021 1037 albuterol inhalation solution 2 5 mg 2 5 mg Nebulization Given     01/11/2021 1150 insulin regular (HumuLIN R,NovoLIN R) 1 Units/mL in sodium chloride 0 9 % 100 mL infusion 1 Units/hr Intravenous New Bag     01/11/2021 1127 insulin regular (HumuLIN R,NovoLIN R) injection 20 Units 20 Units Intravenous Given     01/11/2021 1432 piperacillin-tazobactam (ZOSYN) 3 375 g in sodium chloride 0 9 % 100 mL IVPB 0 g Intravenous Stopped     01/11/2021 1150 piperacillin-tazobactam (ZOSYN) 3 375 g in sodium chloride 0 9 % 100 mL IVPB 3 375 g Intravenous New Bag     01/11/2021 1215 sodium bicarbonate 8 4 % injection 50 mEq 50 mEq Intravenous Given     01/11/2021 1348 potassium chloride (K-DUR,KLOR-CON) CR tablet 40 mEq 40 mEq Oral Given     01/11/2021 1339 sodium chloride 0 9 % bolus 1,000 mL 1,000 mL Intravenous New Bag     01/11/2021 1339 insulin regular (HumuLIN R,NovoLIN R) 1 Units/mL in sodium chloride 0 9 % 100 mL infusion 8 1 Units/hr Intravenous New Bag     01/11/2021 1314 lidocaine (URO-JET) 2 % urethral/mucosal gel 1 application 1 application Urethral Given        Past Medical History:   Diagnosis Date    [facility] [place]     GERD (gastroesophageal reflux disease)     Hypercholesteremia     Hypertension     Methadone use ([facility] )      Present on Admission:  **None**      Admitting Diagnosis: Acidosis [E87 2]  Shortness of breath [R06 02]  Wheezing [R06 2]  Urinary retention [R33 9]  DKA (diabetic ketoacidoses) (HCC) [E11 10]  Leukocytosis [D72 829]  Tachycardia [R00 0]  Hypoxia [R09 02]  Acute respiratory failure with hypoxia (HCC) [J96 01]  Generalized weakness [R53 1]  Age/Sex: 56 y o  male           Admission Orders:        dexamethasone (DECADRON) injection 6 mg   Dose: 6 mg  Freq: Daily Route: IV  Start: 01/12/21 0900 End: 01/11/21 1836      cholecalciferol (VITAMIN D3) tablet 2,000 Units   Dose: 2,000 Units  Freq: Daily Route: PO  Start: 01/12/21 0900    atorvastatin (LIPITOR) tablet 40 mg   Dose: 40 mg  Freq: Daily at bedtime Route: PO  Start: 01/11/21 2200    ascorbic acid (VITAMIN C) tablet 1,000 mg   Dose: 1,000 mg  Freq: Every 12 hours scheduled Route: PO  Start: 01/11/21 2100 End: 01/18/21 2059    potassium chloride 20 mEq IVPB (premix)   Dose: 20 mEq  Freq: Every 1 hour scheduled Route: IV  Start: 01/11/21 1600 End: 01/11/21 1836    cefepime (MAXIPIME) 2,000 mg in dextrose 5 % 50 mL IVPB   Dose: 2,000 mg  Freq: Every 12 hours Route: IV  Start: 01/12/21 0145 End: 01/11/21 1915    cefTRIAXone (ROCEPHIN) 1,000 mg in dextrose 5 % 50 mL IVPB   Dose: 1,000 mg  Freq: Every 24 hours Route: IV  Last Dose: 1,000 mg (01/12/21 0128)  Start: 01/11/21 0900    Continuous infusion:        insulin regular (HumuLIN R,NovoLIN R) 1 Units/mL in sodium chloride 0 9 % 100 mL infusion   Rate: 0 1-30 mL/hr Dose: 0 1-30 Units/hr  Freq: Continuous Route: IV  Last Dose: 16 2 Units/hr (01/11/21 1607)  Start: 01/11/21 1300 End: 01/11/21 1836      sodium chloride infusion 0 45 %   Rate: 250 mL/hr Dose: 250 mL/hr  Freq: Continuous Route: IV  Last Dose: 250 mL/hr (01/11/21 1611)  Start: 01/11/21 1600 End: 01/11/21 1836          Network Utilization Review Department  ATTENTION: Please call with any questions or concerns to [phone] and carefully listen to the prompts so that you are directed to the right person  All voicemails are confidential   Parker Fonseca all requests for admission clinical reviews, approved or denied determinations and any other requests to dedicated fax number below belonging to the campus where the patient is receiving treatment   List of dedicated fax numbers for the Facilities:  [address] DENIALS (Administrative/Medical Necessity) [phone]   [address] (Maternity/NICU/Pediatrics) [phone] [address] Malia Robles 5759 (Mitchell Epps "Cara" 103) [address] [place] [address] [address]eline Almeida [phone] [place] [address] [phone]

## 2021-01-12 NOTE — H&P
H&P- Codi Lockwood 1960, 56 y o  male MRN: [id number]    Unit/Bed#: ICU 12 Encounter: [id number]    Primary Care Provider: VERONICA Torres   Date and time admitted to hospital: 1/11/2021  6:36 PM        * Acute respiratory failure with hypoxia [facility])  Assessment & Plan  · Patient is a 55 y/o M whom was found to be COVID-19 Positive on 12/31  · Since his initial diagnosis he had multiple visits to outside ER 2/2 nausea/vomiting; however was never noted to be hypoxic and subsequently discharged  · Patient per chart review he apparently has been experiencing Nausea/Vomiting over the past week and presented today with shortness of breath, sore throat as well as reported per PO intake per family  · Continue SpO2 monitoring, patient noted to have been on Vapotherm at [facility], transition to [facility]  · CXR from earlier today reviewed noting bilateral ground glass opacities  · Titrate FiO2 as tolerated  · Will place on the Severe COVID treatment algorithm    Pneumonia due to COVID-19 virus  Assessment & Plan  · Patient was diagnosed with COVID on 12/31/2021  · Patient initially did not require O2 supplementation and was discharged home  · Multiple ER visits secondary to Nausea/Vomiting, still not noted to be Hypoxic and d/c home  · Upon presentation today to [facility] with Nausea/Vomiting, as well as shortness of breath, noted to be Hypoxic and started on Vapotherm 85/30 - also found to be in DKA  · Patient 12 days out from initial diagnosis and had not remdesivir therapy  · Patient received 125 mg of Solu-Medrol in the ER today, will continue dexamethasone 0 1mg/kg q12h x10 days  · Will place on the Severe COVID pathway  · Check Blood Type  · Monitor CBC / CMP daily  · Obtain 12 Lead EKG  · Obtain CRP, DDimer and Ferritin now   · Troponin at outside hospital Negative  · Obtain CK as well as NT Bnp  · Was initiated on Cefepime and Vancomycin; however no recent hospital admission although ER visits  · Will change to Doxycycline and Ceftriaxone   · Initial Procalcitonin 0 31, continue to trend  · Will repeat Blood cultures  · Urine Strep / Legionella pending  · Follow-up MRSA screen  · Vit D3 / Vit C and Zinc if able to tolerate PO  · Atorvastatin 40mg QHS  · Famotidine 20mg BID  · Check Chronic Hepatitis and Rapid HIV  · Will place on Anticoagulation per DDimer - markedly elevated; however STAT Head CT prior to anticoagulation initiation    Diabetic ketoacidosis without coma associated with type 2 diabetes mellitus [facility])  Assessment & Plan  Lab Results   Component Value Date    HGBA1C 11 6 (H) 12/09/2020       Recent Labs     01/11/21  1508 01/11/21  1604 01/11/21  1714 01/11/21  1740   POCGLU 421* 397* 339* 305*       Blood Sugar Average: Last 72 hrs:  · Possibly triggered by bacterial infection/COVID infection  · Beta Hydroxybuterate of 6 2  · Was Initiated on DKA protocol at [facility]  · Received 1 5L NSS  · Noted most recent BMP with most recent Na 150 at [facility]  · Will administer additional Fluid resuscitation with Isolyte, administering 1L bolus now  · Will then place on 125ml/h Isolyte  · Will obtain repeat BMP as well as Mg/Phos  · Replete electrolytes as needed  · Patient received bicarb in the ER due to significant metabolic acidosis  · Will continue NPO as patient remains encephalopathic    Acute metabolic encephalopathy  Assessment & Plan  · Suspect 2/2 profound Metabolic Acidosis  · Head CT ordered as [facility]; however not obtained, will obtain STAT Head CT  · Discussed with patient nephew Brandyn Timmons as apparently multiple family members with significant COVID symptoms  · Nephew reported patient with persistent Nausea and Vomiting for over 1 week, unable to eat and drink and additionally not taking DM medications  · Evening 1/10 he reports patient only shaking Head Yes and No, no facial droop or slurred speech noted at that time - Nephew believed non-verbal as he was complaining of severe sore throat over the past 2 days  · Will monitor mental status closely    Metabolic acidosis, increased anion gap  Assessment & Plan  · In setting of DKA  · Elevated Lactate  · Initial lactate 2 8, will trend    Acute kidney injury ([address] )  Assessment & Plan  · Suspect Secondary to profound dehydration  · Most recent Cr 0 96 today 1 43  · Will continue IV fluids as well as fluid boluses  · Will monitor volume status closely as also COVID positive  · Noted significant hematuria upon arrival here at [facility]  · Maintain rogers  · Irrigate     Acute pancreatitis  Assessment & Plan  · Suspect acute  · Lipase 539, will trend  · No abdominal pain on exam    -------------------------------------------------------------------------------------------------------------  Chief Complaint:   None offered    History of Present Illness   HX and PE limited by: Altered Mental Status  Radha Mccurdy is a 56 y o  male who presented to [address] [place] [address] upon [email or web address] transfer from [facility]   Upon chart review as well as discussion with patients [facility], he reports Paola Liriano was originally diagnosed with COVID-19 on 12/31  Patient was not found to be hypoxic at that time and was discharged home  Patient since his diagnosis has had multiple ER visits: 1/3, 1/4, 1/6 secondary to profound Nausea and vomiting  Patient presented today with reported changed in mental status, shortness of breath, as well as continued Nausea and vomiting  Patient has an extensive PMH of DM, HTN, HLD, Hep C, and GERD  In further discussion with Nephew he reports patient resides with multiple other family members including Brandee Flynn, entire family in house are diagnosed with COVID  Family reports over the past several days, patient has been unable to eat or drink, as well as had been unable to take any of his medications, including DM meds    1/10 they noted patient complaining of sore throat and appear to be more "out of it" although he would shake his head Yes or No to questions  Family denies any complaints of headache, they also state no noted facial droop, no noted asymmetric weakness, and there was no slurred speech when the patient was talking  Today [facility]  Workup included Labs revealing CO2 10, Gap 28, BUN 65, Cr 1 47 and glucose of 654  Initial Lactic acid of 2 8, WBC 20 2, Hgb 15 6,   Beta Hydroxy 6 2, INR 1 38, VBG 7 11 / 22  He was administered 1amp of Sodium Bicarb in the ER as well as Zosyn and was subsequently placed on Cefepime and Vancomycin on admission at [facility]  He received 125mg Solumedrol, and received 1 5L total of NSS, placed on Insulin gtt DKA protocol and being Transferred to [facility] ICU  History obtained from chart review, unobtainable from patient due to mental status and discussed with family   -------------------------------------------------------------------------------------------------------------  Dispo: Admit to Critical Care     Code Status: Level 1 - Full Code  --------------------------------------------------------------------------------------------------------------  Review of Systems    Review of systems was unable to be performed secondary to change in mental status    Physical Exam     General:  57 y/o M in ICU bed 12  He appears ill, very dry; however awake, alert, tachypneic on HFNC, shaking head yes and no, but not appropriately all the time  HEENT: Normocephalic, atraumatic   PERR, Sclera anicteric, conjunctiva pink, oropharynx with erythema posteriorly, no exudate, uvula midline, No facial droop appreciated, mucous membranes dry  Neck: supple  Heart: RRR, apically tachycardic, no murmur, rub, or gallop  Lungs: decreased breath sounds bilaterally, no wheezing, rales, or rhonchi  Abd: soft, no grimace to palpation, no guarding, rebound, active BS noted  : rogers with gross hematuria in tube  Skin: warm and dry  Neuro: GCS 13, alert, shakes head yes and no, no facial droop, extremity strength equal  --------------------------------------------------------------------------------------------------------------  Vitals:   Vitals:    01/11/21 1847 01/11/21 1850 01/11/21 1900   BP: 164/78  170/93   BP Location:   Right arm   Pulse: (!) 122  (!) 115   Resp: (!) 35     Temp:   (!) 96 4 °F (35 8 °C)   TempSrc:   Bladder   SpO2: 91% 91% 93%   Weight: 73 2 kg (161 lb 6 oz)     Height: 5' 8" (1 727 m)       Temp  Min: 95 4 °F (35 2 °C)  Max: 96 8 °F (36 °C)  IBW: 68 4 kg  Height: 5' 8" (172 7 cm)  Body mass index is 24 54 kg/m²      Laboratory and Diagnostics:  Results from last 7 days   Lab Units 01/11/21  1020 01/06/21  0527   WBC Thousand/uL 20 20* 3 00*   HEMOGLOBIN g/dL 15 6 14 5   HEMATOCRIT % 48 3* 42 7   PLATELETS Thousands/uL 237 79*   MONO PCT % 7 4     Results from last 7 days   Lab Units 01/11/21  1422 01/11/21  1020 01/06/21  0527   SODIUM mmol/L 150* 143 132*   POTASSIUM mmol/L 3 3* 3 8 5 1   CHLORIDE mmol/L 111* 105 94*   CO2 mmol/L 10* 10* 22   ANION GAP mmol/L 29* 28* 16*   BUN mg/dL 64* 65* 23   CREATININE mg/dL 1 43* 1 47* 0 96   CALCIUM mg/dL 9 2 9 8 8 9   GLUCOSE RANDOM mg/dL 550* 654* 279*   ALT U/L 25  --  27   AST U/L 29  --  58*   ALK PHOS U/L 114  --  71   ALBUMIN g/dL 2 9*  --  3 5   TOTAL BILIRUBIN mg/dL 0 70  --  0 80     Results from last 7 days   Lab Units 01/11/21  1422 01/11/21  1020 01/06/21  0527   MAGNESIUM mg/dL 2 5 2 6 1 9   PHOSPHORUS mg/dL 2 7*  --   --       Results from last 7 days   Lab Units 01/11/21  1020   INR  1 38*   PTT seconds 26      Results from last 7 days   Lab Units 01/11/21  1020 01/06/21  0557   TROPONIN I ng/mL <0 03 <0 03     Results from last 7 days   Lab Units 01/11/21  1224 01/11/21  1020   LACTIC ACID mmol/L 2 5* 2 8*       Results from last 7 days   Lab Units 01/11/21  1720   PH REAL  7 310   PCO2 REAL mm Hg 23 2   PO2 REAL mm Hg 95 0*   HCO3 REAL mmol/L 11 4*   BASE EXC REAL mmol/L -13 1     Results from last 7 days   Lab Units 01/11/21  1151 PROCALCITONIN ng/ml 0 31*       Micro:  Results from last 7 days   Lab Units 21  1020   BLOOD CULTURE  Received in Microbiology Lab  Culture in Progress  Received in Microbiology Lab  Culture in Progress  EK Lead EKG reveals NSR rate 96, QTc 422ms, NSR no elevation or ischemic changes  Imaging: I have personally reviewed pertinent reports  and I have personally reviewed pertinent films in PACS      Historical Information   Past Medical History:   Diagnosis Date    Diabetes mellitus ([facility] )     GERD (gastroesophageal reflux disease)     Hypercholesteremia     Hypertension     Methadone use ([facility] )      No past surgical history on file    Social History   Social History     Substance and Sexual Activity   Alcohol Use No     Social History     Substance and Sexual Activity   Drug Use No    Comment: methadone     Social History     Tobacco Use   Smoking Status Former Smoker   Smokeless Tobacco Never Used     Exercise History: Unknown  Family History:   Family History   Problem Relation Age of Onset    Diabetes Mother     Hypertension Father     Leukemia Father     Acute lymphoblastic leukemia Father     Cancer Sister      I have reviewed this patient's family history and commented on sigificant items within the HPI and Reviewed per EMR      Medications:  Current Facility-Administered Medications   Medication Dose Route Frequency    ascorbic acid (VITAMIN C) tablet 1,000 mg  1,000 mg Oral Q12H [address]    atorvastatin (LIPITOR) tablet 40 mg  40 mg Oral HS    cefTRIAXone (ROCEPHIN) 1,000 mg in dextrose 5 % 50 mL IVPB  1,000 mg Intravenous Q24H    [START ON 2021] cholecalciferol (VITAMIN D3) tablet 2,000 Units  2,000 Units Oral Daily    [START ON 2021] dexamethasone (DECADRON) injection 6 mg  6 mg Intravenous Daily    dextrose 5 % and sodium chloride 0 9 % infusion  250 mL/hr Intravenous Continuous    doxycycline (VIBRAMYCIN) 100 mg in sodium chloride 0 9 % 100 mL IVPB  100 mg Intravenous Q12H    famotidine (PEPCID) tablet 20 mg  20 mg Oral BID    insulin regular (HumuLIN R,NovoLIN R) 1 Units/mL in sodium chloride 0 9 % 100 mL infusion  0 1-30 Units/hr Intravenous Continuous    multi-electrolyte (ISOLYTE-S PH 7 4) bolus 1,000 mL  1,000 mL Intravenous Once    multi-electrolyte (PLASMALYTE-A/ISOLYTE-S PH 7 4) IV solution  150 mL/hr Intravenous Continuous    [START ON 2021] zinc sulfate (ZINCATE) capsule 220 mg  220 mg Oral Daily    Followed by   Wendie Torres ON 2021] multivitamin-minerals (CENTRUM ADULTS) tablet 1 tablet  1 tablet Oral Daily    sodium chloride (PF) 0 9 % injection 3 mL  3 mL Intravenous Q1H PRN     Home medications:  Prior to Admission Medications   Prescriptions Last Dose Informant Patient Reported? Taking?    Blood Glucose Monitoring Suppl (Christiana Paris) w/Device KIT  Self No No   Sig: Use to test blood sugars 3 times daily   Empagliflozin (Jardiance) 25 MG TABS   No No   Sig: Take 1 tablet (25 mg total) by mouth every morning   Insulin Pen Needle 31G X 5 MM MISC  Self No No   Sig: by Does not apply route daily Pt to inject 4 X daily   Insulin Pen Needle 31G X 5 MM MISC  Self No No   Si units sq 2X daily   ergocalciferol (VITAMIN D2) 50,000 units   No No   Sig: Take 1 capsule (50,000 Units total) by mouth once a week   erythromycin (ILOTYCIN) ophthalmic ointment  Self Yes No   Si 5 inches daily at bedtime   furosemide (LASIX) 40 mg tablet  Self No No   Sig: Take 1 tablet (40 mg total) by mouth daily   Patient taking differently: Take 40 mg by mouth as needed (on off work days)    gabapentin (NEURONTIN) 100 mg capsule   No No   Sig: Take 1 capsule (100 mg total) by mouth 3 (three) times a day   glucose blood (OneTouch Verio) test strip   No No   Sig: Use to test blood sugars 3 times daily   insulin NPH-insulin regular (NovoLIN 70/30) 100 units/mL subcutaneous injection   No No   Sig: Inject 40 Units under the skin 2 (two) times a day before meals Patient taking differently: Inject 40 Units under the skin 2 (two) times a day before meals Pt states he takes it "once in awhile"   insulin glargine (LANTUS) 100 units/mL subcutaneous injection   No No   Sig: Inject 10 Units under the skin daily at bedtime   Patient taking differently: Inject 20 Units under the skin daily at bedtime    isosorbide mononitrate (IMDUR) 120 mg 24 hr tablet   No No   Sig: Take 1 tablet (120 mg total) by mouth daily   lisinopril-hydrochlorothiazide (PRINZIDE,ZESTORETIC) 20-25 MG per tablet   No No   Sig: Take 1 tablet by mouth daily   methadone (DOLOPHINE) 10 MG/5ML solution  Self Yes No   Sig: Take 70 mg by mouth   omeprazole (PriLOSEC) 20 mg delayed release capsule   No No   Sig: Take 1 capsule (20 mg total) by mouth daily   ondansetron (ZOFRAN-ODT) 4 mg disintegrating tablet   No No   Sig: Take 1 tablet (4 mg total) by mouth every 6 (six) hours as needed for nausea or vomiting   potassium chloride (K-DUR,KLOR-CON) 20 mEq tablet  Self No No   Sig: Take 2 tablets (40 mEq total) by mouth daily   pravastatin (PRAVACHOL) 40 mg tablet   No No   Sig: Take 1 tablet (40 mg total) by mouth daily   promethazine (PHENERGAN) 25 mg tablet   No No   Sig: TAKE 1 TABLET TWICE A DAY   sitaGLIPtin-metFORMIN (JANUMET)  MG per tablet   No No   Sig: Take 1 tablet by mouth 2 (two) times a day with meals      Facility-Administered Medications: None     Allergies:   Allergies   Allergen Reactions    Varenicline        ------------------------------------------------------------------------------------------------------------  Advance Directive and Living Will:      Power of :    POLST:    ------------------------------------------------------------------------------------------------------------  Anticipated Length of Stay is > 2 midnights    Care Time Delivered:   Upon my evaluation, this patient had a high probability of imminent or life-threatening deterioration due to Hypoxic Respiratory failure / tachypnea, which required my direct attention, intervention, and personal management  I have personally provided 35 minutes (1845 to 1920) of critical care time, exclusive of procedures, teaching, family meetings, and any prior time recorded by providers other than myself  Kit Garcia PA-C        Portions of the record may have been created with voice recognition software  Occasional wrong word or "sound a like" substitutions may have occurred due to the inherent limitations of voice recognition software    Read the chart carefully and recognize, using context, where substitutions have occurred

## 2021-01-12 NOTE — ASSESSMENT & PLAN NOTE
· Suspect 2/2 profound Metabolic Acidosis  · Head CT ordered as [facility]; however not obtained, will obtain STAT Head CT  · [facility] Head [facility] - No acute findings  · Discussed with patient nephew, Carmelo Wooten as apparently multiple family members with significant COVID symptoms  · Nephew reported patient with persistent Nausea and Vomiting for over 1 week, unable to eat and drink and additionally not taking DM medications  · Evening 1/10 he reports patient only shaking Head Yes and No, no facial droop or slurred speech noted at that time - Nephew believed non-verbal as he was complaining of severe sore throat over the past 2 days  · Will monitor mental status closely

## 2021-01-12 NOTE — QUICK NOTE
As patient with further decompensation requiring emergent intubation, I contacted Edgardo's (sisters) phone and Gilda Guillen answered  He advised that Melani Titus does not have a voice 2/2 COVID; however she was with Gilda Guillen at the time of my call  They were updated regarding all overnight events, including vomiting this am which required emergent intubation / MV support  They were appreciative of the update and at this time would like to continue with all treatment modalities possible to save Jalen's life

## 2021-01-12 NOTE — ASSESSMENT & PLAN NOTE
· Gross Hematuria present on admission after traumatic rogers insertion  · Initially with 16 rogers with large clots and unable to irrigate  · Rogers removed in attempt to increase to larger 3way rogers  · Multiple attempts unsuccessful - Bladder scan >500ml  · Urology STAT consulted - performed bedside cysto with rogers placement over guidewire  · Urine has begun to clear, continue to monitor as we are initiating Heparin infusion as well 2/2 DDimer >8

## 2021-01-12 NOTE — ASSESSMENT & PLAN NOTE
Possible Severe Sepsis POA in the setting of Covid Pna with Acute Respiratory Failure, ANNALEE a/e/b HR 97 and RR 40 treated with IV Rocephin, IV Decadron, IV Vibramycin, Intubation, ICU monitoring, CTA Chest, Chest xray, blood cultures, procalcitonin, lab monitoring and VS monitoring   Findings:   Sepsis Criteria "may be clinically indicated by any 2 of the 4 following indicators in the presence of an infectious process: 1  Temperature >38*C or <36C   2  Heart Rate >90/min   3  Respiratory Rate >20/min or PaCO2 <32 mm Hg   4  WBC >12,000/mm3 or <4000/mm3 or >10% immature bands"

## 2021-01-12 NOTE — ASSESSMENT & PLAN NOTE
Lab Results   Component Value Date    HGBA1C 11 6 (H) 12/09/2020       Recent Labs     01/11/21  2103 01/11/21  2234 01/12/21  0034 01/12/21  0226   POCGLU 262* 161* 132 114       Blood Sugar Average: Last 72 hrs:  · (P) 217Possibly triggered by bacterial infection/COVID infection  · Beta Hydroxybuterate of 6 2  · Continue on DKA protocol / insulin infusion  · Received 1 5L NSS at [facility]  · Noted most recent BMP Na 150 at [facility]; however initial Na corrected for Glucose was 152  · Received additional IV fluid bolus x1L  · Currently on 125ml/h of Isolyte  · Repeat BMP / Mg / Phos q4h  · Aggressively replete electrolytes as needed  · Patient received bicarb in the ER due to significant metabolic acidosis  · Will continue NPO as patient remains encephalopathic  · Obtained additional peripheral access; however may require cental line placement with current medication regimen

## 2021-01-12 NOTE — CONSULTS
UROLOGY CONSULTATION NOTE     Patient Identifiers: Barry Farley (MRN [id number])  Service Requesting Consultation: ICU  Service Providing Consultation:  Urology, VERONICA Rust    Date of Service: 1/12/2021  Consults    Reason for Consultation: Gross hematuria    ASSESSMENT:     56 y o  old male with  gross hematuria  On examination patient is in respiratory distress on BiPAP  No significant suprapubic distension noted  Creatinine 1 41, afebrile, and hypertensive  Attempted to insert 3 way rogers catheter, however resistance met at distal urethra  Patient noted to have bleeding from urethra  Decision was made to proceed with bedside flexible cystoscopy  Please refer to procedure note  18 french [place] tip catheter was inserted via guidewire  Urine output dark sherwin and clear with scant bloody sediment  PLAN:   -Maintain rogers catheter to gravity drainage  Do not remove, and keep in place for at least 7 days     -Continue to monitor urine output and irrigate as needed to maintain patency  -Recommend holding anticoagulants until urine has remained clear for 24-48 hours  -Oxybutynin as needed for management of bladder spasms  -Send specimen for UA and culture, and treat based on final sensitivity    Will continue to monitor        History of Present Illness:     Barry Farley is a 56 y o  male transferred from [facility] on 1/11/2021 with DKA, positive COVID-19 virus, and acute respiratory failure  He is currently in ICU  Rogers catheter was placed with reports of some mild difficult at [facility]  Patient developed gross hematuria, and nurses unable to irrigate catheter  They had extracted numerous large blood clots  Rogers catheter was removed to insert 3 way rogers catheter for initiation of CBI  Several nurses made attempts to reinsert rogers catheter, but were unsuccessful  Bladder scan volume 216 mL  Patient is lethargic, and unable to provide history    No prior urologic history noted in his medical records  Last PSA from 2018 was 0 2  Past Medical, Past Surgical History:     Past Medical History:   Diagnosis Date    [facility]     GERD (gastroesophageal reflux disease)     Hypercholesteremia     Hypertension     Methadone use ([facility] )    :    No past surgical history on file :    Medications, Allergies:     Current Facility-Administered Medications   Medication Dose Route Frequency    ascorbic acid (VITAMIN C) tablet 1,000 mg  1,000 mg Oral Q12H [address]    atorvastatin (LIPITOR) tablet 40 mg  40 mg Oral HS    cefTRIAXone (ROCEPHIN) 1,000 mg in dextrose 5 % 50 mL IVPB  1,000 mg Intravenous Q24H    cholecalciferol (VITAMIN D3) tablet 2,000 Units  2,000 Units Oral Daily    dexamethasone (DECADRON) injection 6 mg  6 mg Intravenous Daily    dextrose 5 % and sodium chloride 0 9 % infusion  250 mL/hr Intravenous Continuous    doxycycline (VIBRAMYCIN) 100 mg in sodium chloride 0 9 % 100 mL IVPB  100 mg Intravenous Q12H    famotidine (PEPCID) tablet 20 mg  20 mg Oral BID    heparin (porcine) subcutaneous injection 5,000 Units  5,000 Units Subcutaneous Q8H [address]    insulin regular (HumuLIN R,NovoLIN R) 1 Units/mL in sodium chloride 0 9 % 100 mL infusion  0 1-30 Units/hr Intravenous Continuous    multi-electrolyte (PLASMALYTE-A/ISOLYTE-S PH 7 4) IV solution  150 mL/hr Intravenous Continuous    zinc sulfate (ZINCATE) capsule 220 mg  220 mg Oral Daily    Followed by   Jerral Kocher ON 1/19/2021] multivitamin-minerals (CENTRUM ADULTS) tablet 1 tablet  1 tablet Oral Daily    potassium phosphates 30 mmol in sodium chloride 0 9 % 250 mL infusion  30 mmol Intravenous Once    sodium chloride (PF) 0 9 % injection 3 mL  3 mL Intravenous Q1H PRN       Allergies:   Allergies   Allergen Reactions    Varenicline    :    Social and Family History:   Social History:   Social History     Tobacco Use    Smoking status: Former Smoker    Smokeless tobacco: Never Used Substance Use Topics    Alcohol use: No    Drug use: No     Comment: methadone     Social History     Tobacco Use   Smoking Status Former Smoker   Smokeless Tobacco Never Used       Family History:  Family History   Problem Relation Age of Onset    Diabetes Mother     Hypertension Father     Leukemia Father     Acute lymphoblastic leukemia Father     Cancer Sister    :     Review of Systems:     General: Fever, chills, or night sweats: negative  Cardiac: Negative for chest pain  Pulmonary: Negative for shortness of breath  Gastrointestinal: Abdominal pain negative  Nausea, vomiting, or diarrhea negative,  Genitourinary: See HPI above  Patient does have hematuria  All other systems queried were negative  Physical Exam:   General: Patient is pleasant and in NAD  Awake and alert  BP (!) 173/82   Pulse 97   Temp (!) 97 2 °F (36 2 °C) (Axillary)   Resp (!) 40   Ht 5' 8" (1 727 m)   Wt 73 2 kg (161 lb 6 oz)   SpO2 95%   BMI 24 54 kg/m² Temp (24hrs), Av 2 °F (35 7 °C), Min:95 4 °F (35 2 °C), Max:97 2 °F (36 2 °C)  current; Temperature: (!) 97 2 °F (36 2 °C)  No intake/output data recorded  Skin: warm, dry, intact  Cardiac: S1S2, HRR, Peripheral edema: negative  Pulmonary: Non-labored breathing  Abdomen: Soft, non-tender, non-distended  No surgical scars  No masses, tenderness, hernias noted  Musculoskeletal: AROM with no joint deformity or tenderness  Neurology: lethargic  Genitourinary: Negative CVA tenderness, negative suprapubic tenderness  (Male): Penis circumcised, phallus normal, meatus patent  Testicles descended into scrotum bilaterally without masses nor tenderness        Labs:     Lab Results   Component Value Date    HGB 15 6 2021    HCT 48 3 (H) 2021    WBC 20 20 (H) 2021     2021   ]    Lab Results   Component Value Date    K 3 6 2021     (H) 2021    CO2 19 (L) 2021    BUN 59 (H) 2021    CREATININE 1 51 (H) 01/11/2021    CALCIUM 8 7 01/11/2021   ]    Thank you for allowing me to participate in this patients care  Please do not hesitate to call with any additional questions    [id number] [address]

## 2021-01-12 NOTE — ASSESSMENT & PLAN NOTE
· Patient is a 55 y/o M whom was found to be COVID-19 Positive on 12/31  · Since his initial diagnosis he had multiple visits to outside ER 2/2 nausea/vomiting; however was never noted to be hypoxic and subsequently discharged  · Patient per chart review he apparently has been experiencing Nausea/Vomiting over the past week and presented today with shortness of breath, sore throat as well as reported per PO intake per family  · Continue SpO2 monitoring, patient noted to have been on Vapotherm at [facility], transition to [facility]  · CXR from earlier today reviewed noting bilateral ground glass opacities  · Titrate FiO2 as tolerated  · Placed on the Severe COVID treatment algorithm  · Overnight extremely agitated, throughout the night initially felt 2/2 urinary retention - Urology to bedside and placed rogers over guidewire  · Patient appeared to calm down somewhat initially however still quite agitated, pulling off BiPAP  · Obtained STAT ABG which revealed 7 47/28/75/20  · Of note patient also on Methadone 70mg daily - patient could have a component of withdrawal   Administered Dilaudid IV without significant change  · Will trial initiation of Precedex as I do not believe intubation is necessary at this time if we can get Gudelia Nails to relax; however if necessary and if patient were unable to protect airway, it may become necessary

## 2021-01-12 NOTE — NURSING NOTE
Pt on Bipap, bilateral wrist restraints applied as patient continually pulls off bipap and monitoring equipment  1:1 continual observation in effect  At approx  0500 suddenly vomits copious amounts of dark brown emesis  RT is at bedside with RN during event, bipap removed immediately    CC AP alerted, intubation by provider

## 2021-01-12 NOTE — PROGRESS NOTES
Critical Care Services- Interval Progress Note   Travon Antonio 56 y o  male MRN: [id number]  Unit/Bed#: ICU 12 Encounter: [id number]  Assessment and Plan  STAT called to ICU room 12 by primary RN  Upon arrival found patient whom vomited copious very dark material without evidence of coffee ground - Respiratory therapy in room at time of event, mask was removed immediately  Patient now with gurgling respirations, profoundly tachpneic and hypoxic   Diagnosis: Acute worsening hypoxic respiratory failure  o Plan: patient was on BiPAP, began copiously vomiting  o Respiratory at bedside at time of occurrence, BiPAP mask immediately removed  o Patient emergently intubated with RSI - 7 5 ETT on 2nd attempt which was necessary 2/2 copious vomiting  o Place on MV support - IBW 68kg - will place on AC/VC 16/440/100/6 weaning FiO2 as tolerated  o Post intubation CXR obtained revealing ETT below clavicles, no evidence of Pneumothorax  o Post intubation still markedly tachypneic, appearing uncomfortable on Vent support  o BP tenuous, recently initiated on Precedex, will increase to 0 7, p r n Fentanyl, if BP improves would initiate Propofol infusion titrating to a goal RASS -1 to -2  o Obtain ABG 0600  o Obtain CT Chest/Abd/Pelvis - renal function has improved with GFR >60 - will obtain CTA w/wo contrast r/o PE or other vascular etiologies as well     Diagnosis: Copious Dark Vomit  o Plan: Greater than 1L of dark vomit suctioned, no evidence of coffee ground; however appears more like old blood  o Post intubation place and maintain OG tube  o Administer 80mg Protonix IV Stat  o Place on 8mg/h Protonix infusion  o Heparin gtt was recently initiated secondary to hypercoagulable state without initial bolus    Will d/c infusion for now  o Consider need for GI consult        Upon my evaluation, this patient had a high probability of imminent or life-threatening deterioration due to Copious Vomiting, unable to protect airway, imminent respiratory failure with possible aspiration, which required my direct attention, intervention, and personal management  I have personally provided 45 minutes ([phone] on 01/12/2021 of critical care time, exclusive of procedures, teaching, family meetings, and any prior time recorded by providers other than myself  Time includes review of laboratory data, review of imaging/radiology results, monitoring for potential decompensation    Interventions were performed as documented above    -------------------------------------------------------------------------------------------------------------------------------------  Hemodynamic Monitoring:  Vital Signs:   HR: 103  BP: Tenuous immediately post intubation - with improvement to systolic >151CD/WT  RR: 52  SpO2: 98 on 100 oxygen  Cardiac Monitoring:   Telemetry rhythm: Sinus Tachycardia without ectopy      Laboratory   Results from last 7 days   Lab Units 01/11/21  1020 01/06/21  0527   WBC Thousand/uL 20 20* 3 00*   HEMOGLOBIN g/dL 15 6 14 5   HEMATOCRIT % 48 3* 42 7   PLATELETS Thousands/uL 237 79*   MONO PCT % 7 4     Results from last 7 days   Lab Units 01/12/21  0322 01/11/21  1922 01/11/21  1422 01/11/21  1020 01/06/21  0527   SODIUM mmol/L 160* 154* 150* 143 132*   POTASSIUM mmol/L 3 5 3 6 3 3* 3 8 5 1   CHLORIDE mmol/L 126* 118* 111* 105 94*   CO2 mmol/L 23 19* 10* 10* 22   ANION GAP mmol/L 11 17* 29* 28* 16*   BUN mg/dL 47* 59* 64* 65* 23   CREATININE mg/dL 1 17 1 51* 1 43* 1 47* 0 96   CALCIUM mg/dL 8 1* 8 7 9 2 9 8 8 9   GLUCOSE RANDOM mg/dL 140 261* 550* 654* 279*   ALT U/L  --   --  25  --  27   AST U/L  --   --  29  --  58*   ALK PHOS U/L  --   --  114  --  71   ALBUMIN g/dL  --   --  2 9*  --  3 5   TOTAL BILIRUBIN mg/dL  --   --  0 70  --  0 80     Results from last 7 days   Lab Units 01/12/21  0322 01/11/21  1922 01/11/21  1422 01/11/21  1020 01/06/21  0527   MAGNESIUM mg/dL 2 0 2 2 2 5 2 6 1 9   PHOSPHORUS mg/dL 1 2* 0 8* 2 7*  -- --       Results from last 7 days   Lab Units 01/12/21  0327 01/11/21  1020   INR  1 52* 1 38*   PTT seconds 29 26      Results from last 7 days   Lab Units 01/11/21  1020 01/06/21  0557   TROPONIN I ng/mL <0 03 <0 03     Results from last 7 days   Lab Units 01/12/21  0322 01/12/21  0206 01/11/21  2303 01/11/21  1922 01/11/21  1224 01/11/21  1020   LACTIC ACID mmol/L 4 6* 3 3* 4 9* 3 4* 2 5* 2 8*     ABG:  Results from last 7 days   Lab Units 01/12/21  0254   PH ART  7 471*   PCO2 ART mm Hg 28 9*   PO2 ART mm Hg 75 5   HCO3 ART mmol/L 20 6*   BASE EXC ART mmol/L -1 9   ABG SOURCE  Radial, Right     VBG:  Results from last 7 days   Lab Units 01/12/21  0254 01/11/21  1720   PH REAL   --  7 310   PCO2 REAL mm Hg  --  23 2   PO2 REAL mm Hg  --  95 0*   HCO3 REAL mmol/L  --  11 4*   BASE EXC REAL mmol/L  --  -13 1   ABG SOURCE  Radial, Right  --      Results from last 7 days   Lab Units 01/11/21  1151   PROCALCITONIN ng/ml 0 31*       Micro  Results from last 7 days   Lab Units 01/11/21  1926 01/11/21  1020   BLOOD CULTURE  Received in Microbiology Lab  Culture in Progress  Received in Microbiology Lab  Culture in Progress  Received in Microbiology Lab  Culture in Progress  Received in Microbiology Lab  Culture in Progress  Diagnostic Imaging / Data: I have personally reviewed pertinent reports     and I have personally reviewed pertinent films in PACS    Medications:  Current Facility-Administered Medications   Medication Dose Route Frequency    ascorbic acid (VITAMIN C) tablet 1,000 mg  1,000 mg Oral Q12H [facility]    atorvastatin (LIPITOR) tablet 40 mg  40 mg Oral HS    cefTRIAXone (ROCEPHIN) 1,000 mg in dextrose 5 % 50 mL IVPB  1,000 mg Intravenous Q24H    chlorhexidine (PERIDEX) 0 12 % oral rinse 15 mL  15 mL Swish & Spit Q12H [facility]    cholecalciferol (VITAMIN D3) tablet 2,000 Units  2,000 Units Oral Daily    dexamethasone (DECADRON) injection 6 mg  6 mg Intravenous Daily    dexmedetomidine (PRECEDEX) 400 mcg in sodium chloride 0 9% 100 ml IVPB  0 1-0 7 mcg/kg/hr Intravenous Titrated    dextrose 5 % infusion  75 mL/hr Intravenous Continuous    doxycycline (VIBRAMYCIN) 100 mg in sodium chloride 0 9 % 100 mL IVPB  100 mg Intravenous Q12H    fentaNYL 1000 mcg in sodium chloride 0 9% 100mL infusion  50 mcg/hr Intravenous Continuous    fentanyl citrate (PF) 100 MCG/2ML **ADS Override Pull**        fentanyl citrate (PF) 100 MCG/2ML 50 mcg  50 mcg Intravenous Q2H PRN    insulin regular (HumuLIN R,NovoLIN R) 1 Units/mL in sodium chloride 0 9 % 100 mL infusion  0 1-30 Units/hr Intravenous Continuous    magnesium sulfate 2 g/50 mL IVPB (premix) 2 g  2 g Intravenous Once    zinc sulfate (ZINCATE) capsule 220 mg  220 mg Oral Daily    Followed by   Radha Finch ON 1/19/2021] multivitamin-minerals (CENTRUM ADULTS) tablet 1 tablet  1 tablet Oral Daily    pantoprazole (PROTONIX) 80 mg in sodium chloride 0 9 % 100 mL infusion  8 mg/hr Intravenous Continuous    pantoprazole (PROTONIX) 80 mg in sodium chloride 0 9 % 100 mL IVPB  80 mg Intravenous Once    potassium chloride 20 mEq IVPB (premix)  20 mEq Intravenous Q1H    potassium phosphates 30 mmol in sodium chloride 0 9 % 250 mL infusion  30 mmol Intravenous Once    sodium chloride (PF) 0 9 % injection 3 mL  3 mL Intravenous Q1H PRN       SIGNATURE: Carla Escalante PA-C    Portions of the record may have been created with voice recognition software  Occasional wrong word or "sound a like" substitutions may have occurred due to the inherent limitations of voice recognition software    Read the chart carefully and recognize, using context, where substitutions have occurred

## 2021-01-12 NOTE — ASSESSMENT & PLAN NOTE
· In setting of DKA  · Elevated Lactate  · Initial lactate 2 8, continuing to trend  · I still suspect patient volume down, continue volume resuscitation

## 2021-01-12 NOTE — ASSESSMENT & PLAN NOTE
· Hepatitis C chronic as acquired via IV drug use many years ago  · S/P Epclusa treatment x12 weeks - 9/2017  · Maintained outpatient on Methadone 70mg daily  · HCV RNA remained undetectable 24 weeks following completion of therapy

## 2021-01-12 NOTE — PROCEDURES
BEDSIDE PROCEDURE  Indwelling Catheter PROCEDURE NOTE    Pre-operative Diagnosis: Urethral stricture, gross hematuria        Post-operative Diagnosis: Urethral stricture and gross hematuria    INDICATION   57 y/o male presenting with COVID-19, DKA, and respiratory distress  Consultation requested to perform Veras Catheter Placement  TIME OUT: Correct patient identity  PROCEDURE   Consent: Unable to obtain signed informed consent due to patient's acuity     Under sterile conditions the patient was positioned in supine position  Betadine solution and sterile drapes were utilized  Non- Petroleum based gel was used to lubricate urethral meatus insertion site and flexible cystoscopy was performed  Patient immediately noted to have bloody urine output with shaggy appearance  Bulbar urethra stricture identified  Using guidewire, 18 [place] tip catheter was inserted  Immediate return of clear, sherwin urine was noted  Patient's bedside nurse at bedside to assist   Patient with mild respiratory distress throughout procedure

## 2021-01-12 NOTE — QUICK NOTE
Celio Gomez, nephew and updated patient on treatment plan and nonresponsive status including tachypnea, perfusion problems for lower extremities  Answered questions as best as possible  Nephew was grateful for the update  Also was notified that patient is on Methadone [facility]mg daily from [facility]  Patient visits clinic 1/week for methadone

## 2021-01-12 NOTE — PROGRESS NOTES
Critical Care Services- Interval Progress Note   Leonia Harada 56 y o  male MRN: [id number]  Unit/Bed#: ICU 12 Encounter: [id number]  Assessment and Plan  Advised by Primary RN, patient with worsening hypoxia, as well as now Tachycardia in 130's  Upon bedside assessment noted minimal urine/blood in rogers tubing, nursing advised irrigated but couldn't draw back - suspect distress all secondary to clots 2/2 gross hematuria with now a component of obstruction due to clots       Diagnosis: Acute worsening Hypoxic respiratory failure  o Plan: continue SpO2 monitoring  o Appears worsening distress is secondary to gross hematuria which is causing obstructive uropathy - contributing to patients acute distress  o Will place on BiPAP and wean FiO2 as tolerated  o Although patient has waxing and waning distress, would prefer to hold off on intubation and replace current temp rogers to larger 3way so that we may perform CBI  o Continue to closely monitor respiratory status     Diagnosis: Urinary Retention / Gross Hematuria  o Plan: gross hematuria since rogers catheter attempts  o Attempted to manually irritate 16 temp rogers which was in place without success  o Will need larger rogers with CBI  o Nursing to attempt, may need urgent Urology consult for rogers placement      Upon my evaluation, this patient had a high probability of imminent or life-threatening deterioration due to Worsening Respiratory failure 2/2 work of breathing due to pain 2/2 urinary retention, which required my direct attention, intervention, and personal management  I have personally provided 30 minutes (0201 to 5034) on 01/11/2021 of critical care time, exclusive of procedures, teaching, family meetings, and any prior time recorded by providers other than myself  Time includes monitoring for potential decompensation  Interventions were performed as documented above  -------------------------------------------------------------------------------------------------------------------------------------  Hemodynamic Monitoring:  Vital Signs:   HR: 372  BP: 930A systolic  RR: 52  SpO2: 45% on 100 oxygen  Cardiac Monitoring:   Telemetry rhythm: Sinus Tachycardia    Laboratory   Results from last 7 days   Lab Units 01/11/21 1020 01/06/21  0527   WBC Thousand/uL 20 20* 3 00*   HEMOGLOBIN g/dL 15 6 14 5   HEMATOCRIT % 48 3* 42 7   PLATELETS Thousands/uL 237 79*   MONO PCT % 7 4     Results from last 7 days   Lab Units 01/11/21 1922 01/11/21 1422 01/11/21 1020 01/06/21  0527   SODIUM mmol/L 154* 150* 143 132*   POTASSIUM mmol/L 3 6 3 3* 3 8 5 1   CHLORIDE mmol/L 118* 111* 105 94*   CO2 mmol/L 19* 10* 10* 22   ANION GAP mmol/L 17* 29* 28* 16*   BUN mg/dL 59* 64* 65* 23   CREATININE mg/dL 1 51* 1 43* 1 47* 0 96   CALCIUM mg/dL 8 7 9 2 9 8 8 9   GLUCOSE RANDOM mg/dL 261* 550* 654* 279*   ALT U/L  --  25  --  27   AST U/L  --  29  --  58*   ALK PHOS U/L  --  114  --  71   ALBUMIN g/dL  --  2 9*  --  3 5   TOTAL BILIRUBIN mg/dL  --  0 70  --  0 80     Results from last 7 days   Lab Units 01/11/21 1922 01/11/21 1422 01/11/21 1020 01/06/21  0527   MAGNESIUM mg/dL 2 2 2 5 2 6 1 9   PHOSPHORUS mg/dL 0 8* 2 7*  --   --       Results from last 7 days   Lab Units 01/11/21  1020   INR  1 38*   PTT seconds 26      Results from last 7 days   Lab Units 01/11/21  1020 01/06/21  0557   TROPONIN I ng/mL <0 03 <0 03     Results from last 7 days   Lab Units 01/11/21  1922 01/11/21  1224 01/11/21  1020   LACTIC ACID mmol/L 3 4* 2 5* 2 8*     ABG:    VBG:  Results from last 7 days   Lab Units 01/11/21  1720   PH REAL  7 310   PCO2 REAL mm Hg 23 2   PO2 REAL mm Hg 95 0*   HCO3 REAL mmol/L 11 4*   BASE EXC REAL mmol/L -13 1     Results from last 7 days   Lab Units 01/11/21  1151   PROCALCITONIN ng/ml 0 31*       Micro  Results from last 7 days   Lab Units 01/11/21  1020   BLOOD CULTURE  Received in Microbiology Lab  Culture in Progress  Received in Microbiology Lab  Culture in Progress  Diagnostic Imaging / Data: I have personally reviewed pertinent reports  and I have personally reviewed pertinent films in PACS    Medications:  Current Facility-Administered Medications   Medication Dose Route Frequency    ascorbic acid (VITAMIN C) tablet 1,000 mg  1,000 mg Oral Q12H [address]    atorvastatin (LIPITOR) tablet 40 mg  40 mg Oral HS    cefTRIAXone (ROCEPHIN) 1,000 mg in dextrose 5 % 50 mL IVPB  1,000 mg Intravenous Q24H    [START ON 1/12/2021] cholecalciferol (VITAMIN D3) tablet 2,000 Units  2,000 Units Oral Daily    [START ON 1/12/2021] dexamethasone (DECADRON) injection 6 mg  6 mg Intravenous Daily    dextrose 5 % and sodium chloride 0 9 % infusion  250 mL/hr Intravenous Continuous    doxycycline (VIBRAMYCIN) 100 mg in sodium chloride 0 9 % 100 mL IVPB  100 mg Intravenous Q12H    famotidine (PEPCID) tablet 20 mg  20 mg Oral BID    insulin regular (HumuLIN R,NovoLIN R) 1 Units/mL in sodium chloride 0 9 % 100 mL infusion  0 1-30 Units/hr Intravenous Continuous    multi-electrolyte (PLASMALYTE-A/ISOLYTE-S PH 7 4) IV solution  150 mL/hr Intravenous Continuous    [START ON 1/12/2021] zinc sulfate (ZINCATE) capsule 220 mg  220 mg Oral Daily    Followed by   Mounika Finney ON 1/19/2021] multivitamin-minerals (CENTRUM ADULTS) tablet 1 tablet  1 tablet Oral Daily    potassium phosphates 30 mmol in sodium chloride 0 9 % 250 mL infusion  30 mmol Intravenous Once    sodium chloride (PF) 0 9 % injection 3 mL  3 mL Intravenous Q1H PRN       SIGNATURE: Beba Fuller PA-C    Portions of the record may have been created with voice recognition software  Occasional wrong word or "sound a like" substitutions may have occurred due to the inherent limitations of voice recognition software    Read the chart carefully and recognize, using context, where substitutions have occurred

## 2021-01-12 NOTE — PROGRESS NOTES
Progress Note - Leonia Harada 1960, 56 y o  male MRN: [id number]    Unit/Bed#: ICU 12 Encounter: [id number]    Primary Care Provider: VERONICA House   Date and time admitted to hospital: 1/11/2021  6:36 PM        * Acute respiratory failure with hypoxia [facility])  Assessment & Plan  · Patient is a 57 y/o M whom was found to be COVID-19 Positive on 12/31  · Since his initial diagnosis he had multiple visits to outside ER 2/2 nausea/vomiting; however was never noted to be hypoxic and subsequently discharged  · Patient per chart review he apparently has been experiencing Nausea/Vomiting over the past week and presented today with shortness of breath, sore throat as well as reported per PO intake per family  · Continue SpO2 monitoring, patient noted to have been on Vapotherm at [facility], transition to [facility]  · CXR from earlier today reviewed noting bilateral ground glass opacities  · Titrate FiO2 as tolerated  · Placed on the Severe COVID treatment algorithm  · Overnight extremely agitated, throughout the night initially felt 2/2 urinary retention - Urology to bedside and placed rogers over guidewire  · Patient appeared to calm down somewhat initially however still quite agitated, pulling off BiPAP  · Obtained STAT ABG which revealed 7 47/28/75/20  · Of note patient also on Methadone 70mg daily - patient could have a component of withdrawal   Administered Dilaudid IV without significant change  · Will trial initiation of Precedex as I do not believe intubation is necessary at this time if we can get Martha Lasso to relax; however if necessary and if patient were unable to protect airway, it may become necessary    Pneumonia due to COVID-19 virus  Assessment & Plan  · Patient was diagnosed with COVID on 12/31/2021  · Patient initially did not require O2 supplementation and was discharged home  · Multiple ER visits secondary to Nausea/Vomiting, still not noted to be Hypoxic and d/c home  · Upon presentation today to [facility] with Nausea/Vomiting, as well as shortness of breath, noted to be Hypoxic and started on Vapotherm 85/30 - also found to be in DKA  · Patient 12 days out from initial diagnosis and had not remdesivir therapy  · Patient received 125 mg of Solu-Medrol in the ER today, will continue dexamethasone 0 1mg/kg q12h x10 days  · Will place on the Severe COVID pathway  · Check Blood Type  · Monitor CBC / CMP daily  · Obtain 12 Lead EKG  · Troponin at outside hospital Negative  · Obtain CK as well as NT Bnp  · Was initiated on Cefepime and Vancomycin; however no recent hospital admission although ER visits  · Will change to Doxycycline and Ceftriaxone   · Initial Procalcitonin 0 31, continue to trend  · Will repeat Blood cultures  · Urine Strep / Legionella pending  · Follow-up MRSA screen  · Vit D3 / Vit C and Zinc if able to tolerate PO  · Atorvastatin 40mg QHS  · Famotidine 20mg BID  · Check Chronic Hepatitis and Rapid HIV  · STAT Head CT obtained without acute findings  · CRP 80 / Ferritin 1854 / DDimer 8 30  · Initially in setting of gross hematuria, patient only placed on SQ Heparin; however post urologic intervention / rogers placement, urine only slightly red tinged now  Risk vs Benefit of Systemic anticoagulation weighed, will start Heparin VTE high infusion holding initial loading bolus; however allowing therapeutic reboluses    Continue to monitor Hgb / Plt as well as for hematuria to return  · Consider Actemra     Diabetic ketoacidosis without coma associated with type 2 diabetes mellitus [facility])  Assessment & Plan  Lab Results   Component Value Date    HGBA1C 11 6 (H) 12/09/2020       Recent Labs     01/11/21  2103 01/11/21  2234 01/12/21  0034 01/12/21  0226   POCGLU 262* 161* 132 114       Blood Sugar Average: Last 72 hrs:  · (P) 217Possibly triggered by bacterial infection/COVID infection  · Beta Hydroxybuterate of 6 2  · Continue on DKA protocol / insulin infusion  · Received 1 5L NSS at [facility]  · Noted most recent BMP Na 150 at [facility]; however initial Na corrected for Glucose was 152  · Received additional IV fluid bolus x1L  · Currently on 125ml/h of Isolyte  · Repeat BMP / Mg / Phos q4h  · Aggressively replete electrolytes as needed  · Patient received bicarb in the ER due to significant metabolic acidosis  · Will continue NPO as patient remains encephalopathic  · Obtained additional peripheral access; however may require cental line placement with current medication regimen    Acute metabolic encephalopathy  Assessment & Plan  · Suspect 2/2 profound Metabolic Acidosis  · Head CT ordered as [facility]; however not obtained, will obtain STAT Head CT  · CT Head obtained at [facility] - No acute findings  · Discussed with patient nephmirna, Bryan Pearce as apparently multiple family members with significant COVID symptoms  · Nephew reported patient with persistent Nausea and Vomiting for over 1 week, unable to eat and drink and additionally not taking DM medications  · Evening 1/10 he reports patient only shaking Head Yes and No, no facial droop or slurred speech noted at that time - Nephew believed non-verbal as he was complaining of severe sore throat over the past 2 days  · Will monitor mental status closely    Metabolic acidosis, increased anion gap  Assessment & Plan  · In setting of DKA  · Elevated Lactate  · Initial lactate 2 8, continuing to trend  · I still suspect patient volume down, continue volume resuscitation    Acute kidney injury ([address] )  Assessment & Plan  · Suspect Secondary to profound dehydration  · Most recent Cr 0 96 today 1 43  · Will continue IV fluids as well as fluid boluses  · Will monitor volume status closely as also COVID positive  · Noted significant hematuria upon arrival here at [facility]  · Urology consulted for difficult rogers placement  · Urinary retention protocol in setting of Hematuria with initial large clots    Acute pancreatitis  Assessment & Plan  · Suspect acute  · Lipase 539, will trend  · No abdominal pain on exam    Chronic hepatitis C without hepatic coma ([facility] [address] [address] )  Assessment & Plan  · Hepatitis C chronic as acquired via IV drug use many years ago  · S/P Epclusa treatment x12 weeks - 9/2017  · Maintained outpatient on Methadone 70mg daily  · HCV RNA remained undetectable 24 weeks following completion of therapy         Hematuria  Assessment & Plan  · Gross Hematuria present on admission after traumatic rogers insertion  · Initially with 16 rogers with large clots and unable to irrigate  · Rogers removed in attempt to increase to larger 3way rogers  · Multiple attempts unsuccessful - Bladder scan >500ml  · Urology STAT consulted - performed bedside cysto with rogers placement over guidewire  · Urine has begun to clear, continue to monitor as we are initiating Heparin infusion as well 2/2 DDimer >8    Hypernatremia  Assessment & Plan  · Initial Na corrected to 152 for glucose of 654  · Trending sodium with improved glucose, now 160 this am  · Currently was receiving 250ml/h D5 0 9%NSS  · Noted BMPx1 with Normal CO2 and no gap  · D/C current fluids and place on D5W 75ml/h        ----------------------------------------------------------------------------------------  HPI/24hr events:  See HPI under Subjective below    Upon admission to the ICU at [facility] obtained without acute findings  Patient early on appeared quite uncomfortable ; initially thought secondary to worsening respiratory failure; however believed 2/2 pain from urinary retention as a result of gross hematuria post rogers insertion  Urology to bedside placed rogers over guidewire  Patient placed on BiPAP with SpO2 improved; although work of breathing variable  Patient was administered dilaudid without significant benefit  Inflammatory markers markedly elevated including DDimer >8 0  Initially only placed on Chemical DVT PPx with the hematuria; however since improvement in hematuria, systemic anticoagulation initiated    ABG this am revealed 7 47/28/75/20  Will initiate precedex infusion while closely monitoring  Disposition: Continue Critical Care   Code Status: Level 1 - Full Code  ---------------------------------------------------------------------------------------  SUBJECTIVE  HPI:  56 y o  male who presented to [address] [place] [address] upon [employer] transfer from [facility]   Upon chart review as well as discussion with patients Abdiel Cam, he reports Robert Ornelas was originally diagnosed with COVID-19 on 12/31  Patient was not found to be hypoxic at that time and was discharged home  Patient since his diagnosis has had multiple ER visits: 1/3, 1/4, 1/6 secondary to profound Nausea and vomiting  Patient presented today with reported changed in mental status, shortness of breath, as well as continued Nausea and vomiting      Patient has an extensive PMH of DM, HTN, HLD, Hep C, and GERD  In further discussion with Nephew he reports patient resides with multiple other family members including Brandyn Timmons, entire family in house are diagnosed with COVID  Family reports over the past several days, patient has been unable to eat or drink, as well as had been unable to take any of his medications, including DM meds  1/10 they noted patient complaining of sore throat and appear to be more "out of it" although he would shake his head Yes or No to questions  Family denies any complaints of headache, they also state no noted facial droop, no noted asymmetric weakness, and there was no slurred speech when the patient was talking      Today presented to [facility]  Workup included Labs revealing CO2 10, Gap 28, BUN 65, Cr 1 47 and glucose of 654  Initial Lactic acid of 2 8, WBC 20 2, Hgb 15 6,   Beta Hydroxy 6 2, INR 1 38, VBG 7 11 / 22  He was administered 1amp of Sodium Bicarb in the ER as well as Zosyn and was subsequently placed on Cefepime and Vancomycin on admission at [facility]    He received 125mg Solumedrol, and received 1 5L total of NSS, placed on Insulin gtt DKA protocol and being Transferred to [facility] ICU        Review of Systems  Review of systems was unable to be performed secondary to Encephalopathy  ---------------------------------------------------------------------------------------  OBJECTIVE    Vitals   Vitals:    21 2256 21 2300 21 2325 21 0100   BP: (!) 173/82 (!) 181/94  (!) 181/108   BP Location:       Pulse: 97 96  105   Resp:  (!) 55  (!) 50   Temp: (!) 97 2 °F (36 2 °C)      TempSrc: Axillary      SpO2: 97% 98% 95% 96%   Weight:       Height:         Temp (24hrs), Av 2 °F (35 7 °C), Min:95 4 °F (35 2 °C), Max:97 2 °F (36 2 °C)  Current: Temperature: (!) 97 2 °F (36 2 °C)          Respiratory:  SpO2: SpO2: 96 %       Invasive/non-invasive ventilation settings   Respiratory    Lab Data (Last 4 hours)       0254            pH, Arterial       7 471           pCO2, Arterial       28 9           pO2, Arterial       75 5           HCO3, Arterial       20 6           Base Excess, Arterial       -1 9                O2/Vent Data     None                Physical Exam     General:  55 y/o M in ICU bed 12, Ill appearing, awake, alert, however confused and essentially only moaning, shaking head yes/no  HEENT: Normocephalic, atraumatic, PERR, Sclera anicteric, conjunctiva pink, oropharynx with erythema, no exudate, membranes dry  Neck: supple, trachea midline  Heart: RRR, although apically tachycardic, no murmur, rub, or gallop  Lungs: tachypneic; with decreased breath sounds bilaterally, no wheezing, rales, or rhonchi  Abd: soft, no grimace to palpation, no guarding  : rogers with now slightly pink urine; markedly improved from initial presentation  Skin: warm upper extremities, noted cool bilateral feet with delayed capillary refil  Neuro: GCS 13, alert, no facial droop, extremity strength equal    Laboratory and Diagnostics:  Results from last 7 days   Lab Units 21  1020 21  0527   WBC Thousand/uL 20 20* 3 00*   HEMOGLOBIN g/dL 15 6 14 5   HEMATOCRIT % 48 3* 42 7   PLATELETS Thousands/uL 237 79*   MONO PCT % 7 4     Results from last 7 days   Lab Units 01/11/21 1922 01/11/21  1422 01/11/21  1020 01/06/21  0527   SODIUM mmol/L 154* 150* 143 132*   POTASSIUM mmol/L 3 6 3 3* 3 8 5 1   CHLORIDE mmol/L 118* 111* 105 94*   CO2 mmol/L 19* 10* 10* 22   ANION GAP mmol/L 17* 29* 28* 16*   BUN mg/dL 59* 64* 65* 23   CREATININE mg/dL 1 51* 1 43* 1 47* 0 96   CALCIUM mg/dL 8 7 9 2 9 8 8 9   GLUCOSE RANDOM mg/dL 261* 550* 654* 279*   ALT U/L  --  25  --  27   AST U/L  --  29  --  58*   ALK PHOS U/L  --  114  --  71   ALBUMIN g/dL  --  2 9*  --  3 5   TOTAL BILIRUBIN mg/dL  --  0 70  --  0 80     Results from last 7 days   Lab Units 01/11/21 1922 01/11/21  1422 01/11/21  1020 01/06/21  0527   MAGNESIUM mg/dL 2 2 2 5 2 6 1 9   PHOSPHORUS mg/dL 0 8* 2 7*  --   --       Results from last 7 days   Lab Units 01/11/21  1020   INR  1 38*   PTT seconds 26      Results from last 7 days   Lab Units 01/11/21  1020 01/06/21  0557   TROPONIN I ng/mL <0 03 <0 03     Results from last 7 days   Lab Units 01/12/21  0206 01/11/21  2303 01/11/21  1922 01/11/21  1224 01/11/21  1020   LACTIC ACID mmol/L 3 3* 4 9* 3 4* 2 5* 2 8*     ABG:  Results from last 7 days   Lab Units 01/12/21  0254   PH ART  7 471*   PCO2 ART mm Hg 28 9*   PO2 ART mm Hg 75 5   HCO3 ART mmol/L 20 6*   BASE EXC ART mmol/L -1 9   ABG SOURCE  Radial, Right     VBG:  Results from last 7 days   Lab Units 01/12/21  0254 01/11/21  1720   PH REAL   --  7 310   PCO2 REAL mm Hg  --  23 2   PO2 REAL mm Hg  --  95 0*   HCO3 REAL mmol/L  --  11 4*   BASE EXC REAL mmol/L  --  -13 1   ABG SOURCE  Radial, Right  --      Results from last 7 days   Lab Units 01/11/21  1151   PROCALCITONIN ng/ml 0 31*       Micro  Results from last 7 days   Lab Units 01/11/21  1926 01/11/21  1020   BLOOD CULTURE  Received in Microbiology Lab  Culture in Progress  Received in Microbiology Lab  Culture in Progress  Received in Microbiology Lab  Culture in Progress  Received in Microbiology Lab  Culture in Progress  EKG: Telemetry monitor reveals Sinus Tachycardia without ectopy  Imaging: I have personally reviewed pertinent reports  and I have personally reviewed pertinent films in PACS    Intake and Output  I/O     None          Height and Weights   Height: 5' 8" (172 7 cm)  IBW: 68 4 kg  Body mass index is 24 54 kg/m²  Weight (last 2 days)     Date/Time   Weight    01/11/21 1847   73 2 (161 38)                Nutrition       Diet Orders   (From admission, onward)             Start     Ordered    01/11/21 20201 [facility]  Room Service  Once     Question:  Type of Service  Answer:  Room Service- Not Appropriate    01/11/21 1858 01/11/21 1846  Diet NPO  Diet effective now     Question Answer Comment   Diet Type NPO    RD to adjust diet per protocol?  Yes        01/11/21 1845                  Active Medications  Scheduled Meds:  Current Facility-Administered Medications   Medication Dose Route Frequency Provider Last Rate    ascorbic acid  1,000 mg Oral Q12H [address] Jessie Begun, PA-C      atorvastatin  40 mg Oral HS Jessie Begun, PA-C      cefTRIAXone  1,000 mg Intravenous Q24H Jessie Begun, PA-C      cholecalciferol  2,000 Units Oral Daily Jessie Begun, PA-C      dexamethasone  6 mg Intravenous Daily Jessie Begun, PA-C      dexmedetomidine  0 1-0 7 mcg/kg/hr Intravenous Titrated Jessie Begun, PA-C 0 3 mcg/kg/hr (01/12/21 0329)    dextrose 5 % and sodium chloride 0 9 %  250 mL/hr Intravenous Continuous Jessie Begun, PA-C 250 mL/hr (01/11/21 2234)    doxycycline  100 mg Intravenous Q12H Jessie Begun, PA-C 100 mg (01/11/21 2200)    famotidine  20 mg Oral BID Jessie Begun, PA-C      heparin (porcine)  3-30 Units/kg/hr (Order-Specific) Intravenous Titrated Jessie Begun, PA-C      heparin (porcine)  2,800 Units Intravenous Q1H PRN Jessie Begun, PA-C      heparin (porcine)  5,600 Units Intravenous Q1H PRN Jerri Recinos PA-C      insulin regular (HumuLIN R,NovoLIN R) infusion  0 1-30 Units/hr Intravenous Continuous Jerri Recinos PA-C 8 1 Units/hr (01/11/21 2248)    lactated ringers  500 mL Intravenous Once Jerri Recinos PA-C      multi-electrolyte  150 mL/hr Intravenous Continuous Jerri Recinos PA-C Stopped (01/11/21 2235)    zinc sulfate  220 mg Oral Daily Jerri Recinos PA-C      Followed by   Beatrice Hudson ON 1/19/2021] multivitamin-minerals  1 tablet Oral Daily Jerri Recinos PA-C      potassium phosphate  30 mmol Intravenous Once Jerri Recinos PA-C 30 mmol (01/11/21 2200)    sodium chloride (PF)  3 mL Intravenous Q1H PRN Jerri Recinos PA-C       Continuous Infusions:  dexmedetomidine, 0 1-0 7 mcg/kg/hr, Last Rate: 0 3 mcg/kg/hr (01/12/21 0329)  dextrose 5 % and sodium chloride 0 9 %, 250 mL/hr, Last Rate: 250 mL/hr (01/11/21 2234)  heparin (porcine), 3-30 Units/kg/hr (Order-Specific)  insulin regular (HumuLIN R,NovoLIN R) infusion, 0 1-30 Units/hr, Last Rate: 8 1 Units/hr (01/11/21 2248)  multi-electrolyte, 150 mL/hr, Last Rate: Stopped (01/11/21 2235)      PRN Meds:   heparin (porcine), 2,800 Units, Q1H PRN  heparin (porcine), 5,600 Units, Q1H PRN  sodium chloride (PF), 3 mL, Q1H PRN        Invasive Devices Review  Invasive Devices     Peripheral Intravenous Line            Peripheral IV 01/11/21 Left Antecubital less than 1 day    Peripheral IV 01/11/21 Left Wrist less than 1 day                Rationale for remaining devices: Peripheral IVs / Veras Catheter placed by urology with noted stricture on cystoscopy  ---------------------------------------------------------------------------------------  Advance Directive and Living Will:      Power of :    POLST:    ---------------------------------------------------------------------------------------  Care Time Delivered:   Upon my evaluation, this patient had a high probability of imminent or life-threatening deterioration due to Respiratory failure / Agitation / Hypernatremia, which required my direct attention, intervention, and personal management  I have personally provided 45 minutes ([phone]) of critical care time, exclusive of procedures, teaching, family meetings, and any prior time recorded by providers other than myself  Shauna Kennedy PA-C      Portions of the record may have been created with voice recognition software  Occasional wrong word or "sound a like" substitutions may have occurred due to the inherent limitations of voice recognition software    Read the chart carefully and recognize, using context, where substitutions have occurred

## 2021-01-12 NOTE — PROCEDURES
Arterial Line Insertion    Date/Time: 1/12/2021 2:44 PM  Performed by: RT Rosa Elena  Authorized by: Marifer Gibson MD     Patient location:  ICU  Consent:     Consent obtained:  Emergent situation  Universal protocol:     Patient identity confirmed:  Hospital-assigned identification number and arm band  Indications:     Indications: hemodynamic monitoring, multiple ABGs and continuous blood pressure monitoring    Pre-procedure details:     Skin preparation:  Chlorhexidine  Procedure details:     Location / Tip of Catheter:  Radial    Laterality:  Right    Kushal's test performed: yes      Needle gauge:  20 G    Number of attempts:  1    Successful placement: yes      Transducer: waveform confirmed    Post-procedure details:     Post-procedure:  Sterile dressing applied, sutured and wrist guard applied    Patient tolerance of procedure:   Tolerated well, no immediate complications

## 2021-01-12 NOTE — CONSULTS
Consultation - 126 [facility]  Ivonne Ovalle 56 y o  male MRN: [id number]  Unit/Bed#: ICU 12 Encounter: [id number]        Inpatient consult to gastroenterology  Consult performed by: Salo Uribe PA-C  Consult ordered by: Joya Esposito MD          Reason for Consult / Principal Problem:  Hematemesis    HPI: Ivonne Ovalle is a 56y o  year old male with history of diabetes, chronic hepatitis C treated with antiviral agents 5 years ago, on methadone at home, found positive on 12/31 for COVID-19 for whom our service has been consulted due to concern for hematemesis  Since his initial diagnosis of COVID-19 he had multiple visits to outside emergency room due to nausea and vomiting but he had been discharged as he had not been noted to be hypoxic on most occasions  He presented to [facility] yesterday with ongoing nausea vomiting, and also with shortness of breath and was found to be hypoxic and also in DKA  He was placed on severe COVID pathway  He was started on heparin drip for hypercoagulable state associated with COVID  As of this morning around 5:00 a m , he was on BiPAP, and at that time had an episode of vomiting large amount of dark appearing vomitus suspicious for old blood  He was then found with gurgling respirations, profoundly tachypneic and hypoxic, and at approximately 5:15 a m  he was intubated  The heparin infusion was discontinued and he was started on a Protonix drip  Yesterday morning his hemoglobin appeared to be 15 6; this morning at 6:18 a m  it was 12 9, and this afternoon at 1:01 p m  it is 11 1  His BUN is elevated at 45 with creatinine normal at 0 96  Nursing reports that output from his OG tube has been relatively minimal since he was intubated, and he has not had any melena episodes  Patient has no known history of GI bleeding    It appears he has never had colonoscopy or EGD; he was seen by Dr Kesha Pyle of GI a year ago in January 2020, at which time he was recommended for EGD and colonoscopy (for colorectal cancer screening, new onset constipation, and longstanding GERD), but it appears he never ended up having these done, out of concern for the COVID pandemic  REVIEW OF SYSTEMS:    CONSTITUTIONAL: Denies any fever, chills, or rigors  Good appetite, and no recent weight loss  HEENT: No earache or tinnitus  Denies hearing loss or visual disturbances  CARDIOVASCULAR: No chest pain or palpitations  RESPIRATORY: Denies any cough, hemoptysis, shortness of breath or dyspnea on exertion  GASTROINTESTINAL: As noted in the History of Present Illness  GENITOURINARY: No problems with urination  Denies any hematuria or dysuria  NEUROLOGIC: No dizziness or vertigo, denies headaches  MUSCULOSKELETAL: Denies any muscle or joint pain  SKIN: Denies skin rashes or itching  ENDOCRINE: Denies excessive thirst  Denies intolerance to heat or cold  PSYCHOSOCIAL: Denies depression or anxiety  Denies any recent memory loss  Historical Information   Past Medical History:   Diagnosis Date    Diabetes mellitus ([place] [address] )     GERD (gastroesophageal reflux disease)     Hypercholesteremia     Hypertension     Methadone use ([place] [address] )      No past surgical history on file    Social History   Social History     Substance and Sexual Activity   Alcohol Use No     Social History     Substance and Sexual Activity   Drug Use No    Comment: methadone     Social History     Tobacco Use   Smoking Status Former Smoker   Smokeless Tobacco Never Used     Family History   Problem Relation Age of Onset    Diabetes Mother     Hypertension Father     Leukemia Father     Acute lymphoblastic leukemia Father     Cancer Sister        Meds/Allergies     Medications Prior to Admission   Medication    Blood Glucose Monitoring Suppl (ONETOUCH VERIO) w/Device KIT    Empagliflozin (Jardiance) 25 MG TABS    ergocalciferol (VITAMIN D2) 50,000 units    erythromycin (ILOTYCIN) ophthalmic ointment    furosemide (LASIX) 40 mg tablet    gabapentin (NEURONTIN) 100 mg capsule    glucose blood (OneTouch Verio) test strip    insulin glargine (LANTUS) 100 units/mL subcutaneous injection    insulin NPH-insulin regular (NovoLIN 70/30) 100 units/mL subcutaneous injection    Insulin Pen Needle 31G X 5 MM MISC    Insulin Pen Needle 31G X 5 MM MISC    isosorbide mononitrate (IMDUR) 120 mg 24 hr tablet    lisinopril-hydrochlorothiazide (PRINZIDE,ZESTORETIC) 20-25 MG per tablet    methadone (DOLOPHINE) 10 MG/5ML solution    omeprazole (PriLOSEC) 20 mg delayed release capsule    ondansetron (ZOFRAN-ODT) 4 mg disintegrating tablet    potassium chloride (K-DUR,KLOR-CON) 20 mEq tablet    pravastatin (PRAVACHOL) 40 mg tablet    promethazine (PHENERGAN) 25 mg tablet    sitaGLIPtin-metFORMIN (JANUMET)  MG per tablet     Current Facility-Administered Medications   Medication Dose Route Frequency    ascorbic acid (VITAMIN C) tablet 1,000 mg  1,000 mg Oral Q12H [facility]    atorvastatin (LIPITOR) tablet 40 mg  40 mg Oral HS    cefTRIAXone (ROCEPHIN) 1,000 mg in dextrose 5 % 50 mL IVPB  1,000 mg Intravenous Q24H    chlorhexidine (PERIDEX) 0 12 % oral rinse 15 mL  15 mL Swish & Spit Q12H [facility]    cholecalciferol (VITAMIN D3) tablet 2,000 Units  2,000 Units Oral Daily    dexamethasone (PF) (DECADRON) injection 7 3 mg  0 1 mg/kg Intravenous Q12H    dexmedetomidine (PRECEDEX) 400 mcg in sodium chloride 0 9% 100 ml IVPB  0 1-0 7 mcg/kg/hr Intravenous Titrated    dextrose 5 % infusion  125 mL/hr Intravenous Continuous    doxycycline (VIBRAMYCIN) 100 mg in sodium chloride 0 9 % 100 mL IVPB  100 mg Intravenous Q12H    fentaNYL 10 mcg/mL 1000 mcg in sodium chloride 0 9% 100mL infusion **ADS Override Pull**        fentaNYL 1000 mcg in sodium chloride 0 9% 100mL infusion  100 mcg/hr Intravenous Continuous    fentanyl citrate (PF) 100 MCG/2ML **ADS Override Pull**        fentanyl citrate (PF) 100 MCG/2ML **ADS Override Pull**        fentanyl citrate (PF) 100 MCG/2ML 50 mcg  50 mcg Intravenous Q2H PRN    insulin regular (HumuLIN R,NovoLIN R) 1 Units/mL in sodium chloride 0 9 % 100 mL infusion  0 3-21 Units/hr Intravenous Titrated    levalbuterol (XOPENEX) inhalation solution 1 25 mg  1 25 mg Nebulization Q6H    multi-electrolyte (ISOLYTE-S PH 7 4) bolus 1,000 mL  1,000 mL Intravenous Once    zinc sulfate (ZINCATE) capsule 220 mg  220 mg Oral Daily    Followed by   Leatha Rincon ON 1/19/2021] multivitamin-minerals (CENTRUM ADULTS) tablet 1 tablet  1 tablet Oral Daily    pantoprazole (PROTONIX) 80 mg in sodium chloride 0 9 % 100 mL infusion  8 mg/hr Intravenous Continuous    propofol (DIPRIVAN) 1000 mg in 100 mL infusion (premix)  5-50 mcg/kg/min Intravenous Titrated    sodium chloride (PF) 0 9 % injection 3 mL  3 mL Intravenous Q1H PRN    sodium chloride 3 % inhalation solution 4 mL  4 mL Nebulization Q6H       Allergies   Allergen Reactions    Varenicline            Objective     Blood pressure (!) 172/76, pulse 98, temperature (!) 97 1 °F (36 2 °C), temperature source Axillary, resp  rate (!) 101, height 5' 8" (1 727 m), weight 73 2 kg (161 lb 6 oz), SpO2 95 %  Intake/Output Summary (Last 24 hours) at 1/12/2021 1337  Last data filed at 1/12/2021 1200  Gross per 24 hour   Intake 3700 ml   Output 850 ml   Net 2850 ml         PHYSICAL EXAM     REVIEW OF SYSTEMS:    Unobtainable; patient is intubated and sedated      PHYSICAL EXAMINATION:  Examination conducted through window into patient's room, patient is on COVID isolation, patient's condition was also discussed with ICU team at this time    General Appearance:   Patient intubated and sedated   HEENT:  Normocephalic, atraumatic, anicteric  Neck supple, symmetrical, trachea midline  Lungs:   Equal chest rise and unlabored breathing, normal effort, no coughing  Cardiovascular:   No visualized JVD  Abdomen:   No abdominal distension  Skin:   No jaundice, rashes, or lesions  Musculoskeletal:   Normal range of motion visualized  Psych:  Normal affect and normal insight  Neuro:  Alert and appropriate  Lab Results:   No results displayed because visit has over 200 results  Imaging Studies: I have personally reviewed pertinent reports  ASSESSMENT/PLAN:     1  Coffee-ground emesis noted shortly before development of severe hypoxia requiring intubation, in the setting of severe COVID 19 infection  OG output has been reportedly minimal since intubation, hemoglobin appears relatively stable  Patient is also on pressors and also with significant electrolyte abnormalities including sodium 153 and potassium 6 3  Lipase was also elevated greater than 3000, but unclear if this represents pancreatitis (which was not evidence on CT scan from today) or reactive increase in the setting of viral infection    - trend lipase and liver enzymes  - continue management/correction of patient's electrolytes; ideally would plan for EGD nonurgently after these are improved, although can pursue EGD more urgently if patient does develop signs of active/ongoing GI bleeding  - monitor OG tube output closely, monitor patient for melena or other evidence of ongoing GI bleeding  - continue Protonix drip, IV fluids  - I discussed patient's case and plan with nursing and with critical care NP    The patient was seen and examined by Dr Curvin Duane, all johnson medical decisions were made with Dr Curvin Duane  Thank you for allowing us to participate in the care of this pleasant patient  We will follow up with you closely

## 2021-01-12 NOTE — PROCEDURES
Intubation    Date/Time: 1/12/2021 5:15 AM  Performed by: Andre Aguilar PA-C  Authorized by: Andre Aguilar PA-C     Patient location:  Bedside  Other Assisting Provider: No    Consent:     Consent obtained:  Emergent situation  Universal protocol:     Relevant documents present and verified: yes      Test results available and properly labeled: yes      Patient identity confirmed:  Hospital-assigned identification number and arm band  Pre-procedure details:     Patient status:  Altered mental status    Mallampati score:  1    Pretreatment medications:  Etomidate    Paralytics:  None  Indications:     Indications for intubation: respiratory failure and airway protection    Procedure details:     Preoxygenation:  Bag valve mask    Intubation method:  Oral    Oral intubation technique:  Glidescope    Laryngoscope blade: Mac 3    Tube size (mm):  7 5    Tube type:  Cuffed    Number of attempts:  2 (copious vomiting)    Ventilation between attempts: yes      Cricoid pressure: no      Tube visualized through cords: yes    Placement assessment:     ETT to lip:  25    Tube secured with:  ETT zapata    Breath sounds:  Equal and absent over the epigastrium    Placement verification: chest rise, condensation, CXR verification, direct visualization, equal breath sounds, ETCO2 detector and tube exhalation      CXR findings:  ETT in proper place  Post-procedure details:     Patient tolerance of procedure:   Tolerated well, no immediate complications

## 2021-01-12 NOTE — ASSESSMENT & PLAN NOTE
· Suspect Secondary to profound dehydration  · Most recent Cr 0 96 today 1 43  · Will continue IV fluids as well as fluid boluses  · Will monitor volume status closely as also COVID positive  · Noted significant hematuria upon arrival [facility]  · Urology consulted for difficult rogers placement  · Urinary retention protocol in setting of Hematuria with initial large clots

## 2021-01-12 NOTE — ASSESSMENT & PLAN NOTE
· Initial Na corrected to 152 for glucose of 654  · Trending sodium with improved glucose, now 160 this am  · Currently was receiving 250ml/h D5 0 9%NSS  · Noted BMPx1 with Normal CO2 and no gap  · D/C current fluids and place on D5W 75ml/h

## 2021-01-12 NOTE — ASSESSMENT & PLAN NOTE
· Suspect 2/2 profound Metabolic Acidosis  · Head CT ordered as [facility]; however not obtained, will obtain STAT Head CT  · Discussed with patient nephew, Rickie Cisneros as apparently multiple family members with significant COVID symptoms  · Nephew reported patient with persistent Nausea and Vomiting for over 1 week, unable to eat and drink and additionally not taking DM medications  · Evening 1/10 he reports patient only shaking Head Yes and No, no facial droop or slurred speech noted at that time - Nephew believed non-verbal as he was complaining of severe sore throat over the past 2 days  · Will monitor mental status closely

## 2021-01-12 NOTE — ASSESSMENT & PLAN NOTE
· Suspect acute  · Lipase 539, will trend  · No abdominal pain on exam  · No evidence of pancreatitis on CT A/C/P

## 2021-01-12 NOTE — ASSESSMENT & PLAN NOTE
· Suspect 2/2 profound Metabolic Acidosis  · Head CT ordered as [facility]; however not obtained, will obtain STAT Head CT  · CT Head obtained at [facility] - No acute findings  · Discussed with patient nephew, Brandee Flynn as apparently multiple family members with significant COVID symptoms  · Nephew reported patient with persistent Nausea and Vomiting for over 1 week, unable to eat and drink and additionally not taking DM medications  · Evening 1/10 he reports patient only shaking Head Yes and No, no facial droop or slurred speech noted at that time - Nephew believed non-verbal as he was complaining of severe sore throat over the past 2 days  · Will monitor mental status closely

## 2021-01-12 NOTE — ASSESSMENT & PLAN NOTE
· Patient was diagnosed with COVID on 12/31/2021  · Patient initially did not require O2 supplementation and was discharged home  · Multiple ER visits secondary to Nausea/Vomiting, still not noted to be Hypoxic and d/c home  · Upon presentation today to [facility] with Nausea/Vomiting, as well as shortness of breath, noted to be Hypoxic and started on Vapotherm 85/30 - also found to be in DKA  · Patient 12 days out from initial diagnosis and had not remdesivir therapy  · Patient received 125 mg of Solu-Medrol in the ER today, will continue dexamethasone 0 1mg/kg q12h x10 days  · Will place on the Severe COVID pathway  · Check Blood Type  · Monitor CBC / CMP daily  · Obtain 12 Lead EKG  · Obtain CRP, DDimer and Ferritin now   · Troponin at outside hospital Negative  · Obtain CK as well as NT Bnp  · Was initiated on Cefepime and Vancomycin; however no recent hospital admission although ER visits  · Will change to Doxycycline and Ceftriaxone   · Initial Procalcitonin 0 31, continue to trend  · Will repeat Blood cultures  · Urine Strep / Legionella pending  · Follow-up MRSA screen  · Vit D3 / Vit C and Zinc if able to tolerate PO  · Atorvastatin 40mg QHS  · Famotidine 20mg BID  · Check Chronic Hepatitis and Rapid HIV  · Will place on Anticoagulation per DDimer - markedly elevated; however STAT Head CT prior to anticoagulation initiation

## 2021-01-12 NOTE — UTILIZATION REVIEW
Initial Clinical Review    Admission: Date/Time/Statement:        [address]   Admission Orders (From admission, onward)     Ordered        01/11/21 1838  Inpatient Admission  Once                   Orders Placed This Encounter   Procedures    Inpatient Admission     Standing Status:   Standing     Number of Occurrences:   1     Order Specific Question:   Admitting Physician     Answer:   Yumiko Hernandez [[id number]]     Order Specific Question:   Level of Care     Answer:   Critical Care [15]     Order Specific Question:   Estimated length of stay     Answer:   More than 2 Midnights     Order Specific Question:   Certification     Answer:   I certify that inpatient services are medically necessary for this patient for a duration of greater than two midnights  See H&P and MD Progress Notes for additional information about the patient's course of treatment  Assessment/Plan:  55 yo male,  [facility]ed to    [facility] ER on 1/11,  Unable to speak so severely SOB  IN ER multiple times since 12/31  When diagnosed w/COVID 19:  Over last week has experienced N/V, increasing dyspnea, sore throat and poor po intake  IN ER, was hypoxic and in DKA:  Admitted to ICU  For management of  Encephalopathy 2/2 DKA, SEPSIS 2/2 COVID 19  W/acute resp failure, ANNALEE 2/2 dehydration  Initiated  100% FiO2 on Vapotherm, IV solumedrol, IVAB cefepime/vanco, IVF,  Continuation of DKA protocol w/IVF and BMP q 4 hrs w/electrolyte repletion, bicarb gtt  Decision made to transfer to  [facility] for higher level of care:       1/11  @  1838    ADMIT INPT status, Critical level of care,  [facility] 81  After arrival, the patient had progressive respiratory distress and vomiting of coffee ground emesis while on bipap  Pt became profoundly hypoxic and tachypneic,  requiring intubation  (1/12  @  0500)  Protonix gtt initiated    Also w/gross hematuria  Requiring Urology consult:  Unable to place 3 way catheter so performed bedside cystoscopy placing 18 German coude tip catheter via guidewire  (hand irrigate prn)  1/12   VENT:  AC/VC 14/500/6/60 - RR 42 SpO2 92%:   significant tachypnea despite multiple ventilator modes  Required increasing sedation given ventilator dyssynchrony  Urology consulted re hypernatremia/ hematuria and hyperchloremic metabolic acidosis:  Assessed as free water deficit of 5 5 L ; increased D5W to 125 cc/hr  Cont IVAB's  Trending hgb q 8 hr;  Heparin gtt dc'ed  B/l LE duplex to r/o dvt and  CT to r/o PE  GI Consulted and rec continuation of protonix gtt/ close monitor of OG tube for blood        ED Triage Vitals   [facility]    Temperature Pulse Respirations Blood Pressure SpO2   01/11/21 1900 01/11/21 1847 01/11/21 1847 01/11/21 1847 01/11/21 1847   (!) 96 4 °F (35 8 °C) (!) 122 (!) 35 164/78 91 %      Temp Source Heart Rate Source Patient Position - Orthostatic VS BP Location FiO2 (%)   01/11/21 1900 01/11/21 1847 01/11/21 1900 01/11/21 1900 01/11/21 1847   Bladder Monitor Lying Right arm 90      Pain Score       01/12/21 0300       No Pain          Wt Readings from Last 1 Encounters:   01/11/21 73 2 kg (161 lb 6 oz)     Additional Vital Signs:   01/11/21 1900  96 4 °F (35 8 °C)Abnormal   115Abnormal     170/93  123      93 %          Lying       01/11/21 1850                91 %  90  35 L/min  High flow nasal cannula  HFNC prongs         01/11/21 1847    122Abnormal   35Abnormal   164/78  112      91 %  90  35 L/min  High flow nasal cannula             01/12/21 0845    99  49Abnormal   180/104Abnormal   135      91 %                 01/12/21 0814    101  48Abnormal   138/56        92 %      Ventilator    Lying       01/12/21 0800    103  48Abnormal   138/74  92      94 %                 01/12/21 0630    90  51Abnormal   159/58  83      98 %                 01/12/21 0559                98 %  70    Ventilator         01/12/21 0545    102  51Abnormal   153/81  110      96 %                 01/12/21 0515    97  52Abnormal   98/53  68      98 %                 01/12/21 0400    103  56Abnormal   197/98Abnormal   138      94 %  80    Other (comment)            O2 Device: bipap 14/8, rate 14 at 01/12/21 0400   01/12/21 0352                96 %        Full face mask         01/12/21 0100    105  50Abnormal   181/108Abnormal   138      96 %                     Pertinent Labs/Diagnostic Test Results:   1/11  ekg -  isnus tachycardia     1/12   CTAP:   Single small left lower pulmonary embolic filling defect ;  generalized diffuse bilateral groundglass infiltrates, intermediate probability for COVID related finding  Mural thickening/edema through the duodenal sweep suspicious for duodenitis  1/12  cxr -  Findings again consistent with COVID-19 pneumonia, not significantly changed  1/11  cT HEAD -  No acute intracranial abnormality       1/12  Venous duplex scan b/l LE:  Report not yet available       Results from last 7 days   Lab Units 01/12/21  1301 01/12/21  1116 01/12/21  0831 01/12/21  0618 01/11/21  1020 01/06/21  0527   WBC Thousand/uL  --   --   --  11 76* 20 20* 3 00*   HEMOGLOBIN g/dL 11 1*  --   --  12 9 15 6 14 5   I STAT HEMOGLOBIN g/dl  --  9 5* 9 9*  --   --   --    HEMATOCRIT %  --   --   --  37 5 48 3* 42 7   HEMATOCRIT, ISTAT %  --  28* 29*  --   --   --    PLATELETS Thousands/uL  --   --   --  108* 237 79*   NEUTROS ABS Thousands/µL  --   --   --  10 78*  --   --    TOTAL NEUT ABS Thousand/uL  --   --   --   --  17 57* 2 55         Results from last 7 days   Lab Units 01/12/21  1301 01/12/21  1116 01/12/21  0835 01/12/21  0831 01/12/21  0638 01/12/21  0322 01/11/21  1922   SODIUM mmol/L 153*  --  160*  --  161* 160* 154*   POTASSIUM mmol/L 6 3*  --  4 3  --  3 8 3 5 3 6   CHLORIDE mmol/L 125*  --  129*  --  129* 126* 118*   CO2 mmol/L 19*  --  23  --  24 23 19* CO2, I-STAT mmol/L  --  20*  --  21  --   --   --    ANION GAP mmol/L 9  --  8  --  8 11 17*   BUN mg/dL 45*  --  45*  --  45* 47* 59*   CREATININE mg/dL 0 92  --  0 96  --  1 11 1 17 1 51*   EGFR ml/min/1 73sq m 89  --  84  --  71 66 49   CALCIUM mg/dL 7 4*  --  7 7*  --  7 9* 8 1* 8 7   MAGNESIUM mg/dL 2 4  --  2 7*  --  1 9 2 0 2 2   PHOSPHORUS mg/dL 4 4*  --  2 9  --  3 5 1 2* 0 8*     Results from last 7 days   Lab Units 01/12/21  0835 01/11/21  1422 01/06/21  0527   AST U/L  --  29 58*   ALT U/L  --  25 27   ALK PHOS U/L  --  114 71   TOTAL PROTEIN g/dL  --  6 8 7 7   ALBUMIN g/dL  --  2 9* 3 5   TOTAL BILIRUBIN mg/dL  --  0 70 0 80   BILIRUBIN DIRECT mg/dL  --  0 40*  --    AMMONIA umol/L 36*  --   --      Results from last 7 days   Lab Units 01/12/21  1553 01/12/21  1407 01/12/21  1113 01/12/21  0958 01/12/21  0848 01/12/21  0805 01/12/21  0710 01/12/21  0605 01/12/21  0443 01/12/21  0324 01/12/21  0226 01/12/21  0034   POC GLUCOSE mg/dl 347* 276* 136 90 65 92 116 79 132 108 114 132     Results from last 7 days   Lab Units 01/12/21  1301 01/12/21  0835 01/12/21  0638 01/12/21  0322 01/11/21  1922 01/11/21  1422 01/11/21  1020 01/06/21  0527   GLUCOSE RANDOM mg/dL 252* 81 85 140 261* 550* 654* 279*             BETA-HYDROXYBUTYRATE   Date Value Ref Range Status   01/11/2021 6 2 (H) <0 6 mmol/L Final      Results from last 7 days   Lab Units 01/12/21  0254   PH ART  7 471*   PCO2 ART mm Hg 28 9*   PO2 ART mm Hg 75 5   HCO3 ART mmol/L 20 6*   BASE EXC ART mmol/L -1 9   O2 CONTENT ART mL/dL 17 5   O2 HGB, ARTERIAL % 94 2   ABG SOURCE  Radial, Right     Results from last 7 days   Lab Units 01/12/21  1407 01/11/21  1720 01/11/21  1151   PH REAL  7 322 7 310 7 110*   PCO2 REAL mm Hg 33 3* 23 2 22 8   PO2 REAL mm Hg 70 6* 95 0* 70 0*   HCO3 REAL mmol/L 16 8* 11 4* 6 9*   BASE EXC REAL mmol/L -8 3 -13 1 -22 7   O2 CONTENT REAL ml/dL 14 3 19 9 19 5   O2 HGB, VENOUS % 90 4* 94 9 83 5     Results from last 7 days   Lab Units 01/12/21  1116 01/12/21  0831   I STAT BASE EXC mmol/L -4* -5*   I STAT O2 SAT % 88* 92*   ISTAT PH ART  7 432 7 344*   I STAT ART PCO2 mm HG 28 9* 37 0   I STAT ART PO2 mm HG 51 0* 67 0*   I STAT ART HCO3 mmol/L 19 3* 20 2*     Results from last 7 days   Lab Units 01/11/21  1922   CK TOTAL U/L 224   CK MB INDEX % 2 8*   CK MB ng/mL 6 2*     Results from last 7 days   Lab Units 01/11/21  1020 01/06/21  0557   TROPONIN I ng/mL <0 03 <0 03     Results from last 7 days   Lab Units 01/11/21  1922   D-DIMER QUANTITATIVE ug/ml FEU 8 30*     Results from last 7 days   Lab Units 01/12/21  0327 01/11/21  1020   PROTIME seconds 18 4* 16 8*   INR  1 52* 1 38*   PTT seconds 29 26         Results from last 7 days   Lab Units 01/12/21  0638 01/11/21  1151   PROCALCITONIN ng/ml 0 65* 0 31*     Results from last 7 days   Lab Units 01/12/21  1301 01/12/21  1008 01/12/21  0322 01/12/21  0206 01/11/21  2303 01/11/21  1922 01/11/21  1224   LACTIC ACID mmol/L 2 4* 2 8* 4 6* 3 3* 4 9* 3 4* 2 5*             Results from last 7 days   Lab Units 01/11/21  1922   NT-PRO BNP pg/mL 448*     Results from last 7 days   Lab Units 01/12/21  0638 01/11/21  1922   FERRITIN ng/mL 1,716* 1,854*     Results from last 7 days   Lab Units 01/11/21  1922   HEP B S AG  Non-reactive   HEP C AB  High Reactive*   HEP B C IGM  Non-reactive   HEP B C TOTAL AB  Reactive*     Results from last 7 days   Lab Units 01/12/21  0638 01/11/21  1422   LIPASE u/L 3,951* 539*     Results from last 7 days   Lab Units 01/12/21  0638 01/11/21  1922   CRP mg/L 108 1* 80 0*         Results from last 7 days   Lab Units 01/12/21  0640 01/12/21  0206   CLARITY UA  Cloudy Cloudy   COLOR UA  Nelsy Red   SPEC GRAV UA  1 025 1 020   PH UA  6 0 6 0   GLUCOSE UA mg/dl Negative 250 (1/4%)*   KETONES UA mg/dl Negative 15 (1+)*   BLOOD UA  Large* Large*   PROTEIN UA mg/dl 100 (2+)* 100 (2+)*   NITRITE UA  Negative Negative   BILIRUBIN UA  Negative Moderate*   UROBILINOGEN UA E U /dl 1 0 0 2 LEUKOCYTES UA  Trace* Negative   WBC UA /hpf 4-10* 10-20*   RBC UA /hpf 30-50* Innumerable*   BACTERIA UA /hpf Moderate* Innumerable*   EPITHELIAL CELLS WET PREP /hpf Occasional Moderate*     Results from last 7 days   Lab Units 01/12/21  0206   STREP PNEUMONIAE ANTIGEN, URINE  Negative   LEGIONELLA URINARY ANTIGEN  Negative     Results from last 7 days   Lab Units 01/11/21  1926 01/11/21  1020   BLOOD CULTURE  Received in Microbiology Lab  Culture in Progress  Received in Microbiology Lab  Culture in Progress  No Growth at 24 hrs  No Growth at 24 hrs       Past Medical History:   Diagnosis Date    Diabetes mellitus ([place] [address] )     GERD (gastroesophageal reflux disease)     Hypercholesteremia     Hypertension     Methadone use ([place] [address] )      Present on Admission:   Diabetic ketoacidosis without coma associated with type 2 diabetes mellitus (HCC)   Acute respiratory failure with hypoxia (HCC)   Pneumonia due to COVID-19 virus   Acute metabolic encephalopathy   Acute kidney injury ([place] [address] )   Metabolic acidosis, increased anion gap   Acute pancreatitis   Hematuria   Chronic hepatitis C without hepatic coma (HCC)   Hypernatremia   Sepsis due to COVID-19 [facility])    Admitting Diagnosis: Pneumonia due to COVID-19 virus [U07 1, J12 82]  DKA (diabetic ketoacidoses) ([place] [address] ) [E11 10]  Age/Sex: 56 y o  male  Admission Orders:  ICU cardiopulmonary monitoring;  Mechanical ventilation routine care;  Maintain coude urethral catheter;  OG tube to intermittent suction; elevate HOB:  VAS b/l LE venous duplex;  Daily wgt;  I/o q shift;  CVP readings hourly;  Npo;  Restraints prn ;  Continuous insulin gtt w/q 2 hr accucks;    IP CONSULT TO UROLOGY  IP CONSULT TO GASTROENTEROLOGY    Scheduled Medications:  ascorbic acid, 1,000 mg, Oral, Q12H LIZETT  atorvastatin, 40 mg, Oral, HS  cefTRIAXone, 1,000 mg, Intravenous, Q24H  chlorhexidine, 15 mL, Swish & Spit, Q12H [address]  cholecalciferol, 2,000 Units, Oral, Daily  dexamethasone, 0 1 mg/kg, Intravenous, Q12H  doxycycline, 100 mg, Intravenous, Q12H  levalbuterol, 1 25 mg, Nebulization, Q6H  zinc sulfate, 220 mg, Oral, Daily    Followed by  [START ON 1/19/2021] multivitamin-minerals, 1 tablet, Oral, Daily  sodium chloride, 4 mL, Nebulization, Q6H      Continuous IV Infusions:  dexmedetomidine, 0 1-0 7 mcg/kg/hr, Intravenous, Titrated  dextrose, 125 mL/hr, Intravenous, Continuous  fentaNYL, 100 mcg/hr, Intravenous, Continuous  insulin regular (HumuLIN R,NovoLIN R) infusion, 0 3-21 Units/hr, Intravenous, Titrated  pantoprozole (PROTONIX) infusion (Continuous), 8 mg/hr, Intravenous, Continuous  propofol, 5-50 mcg/kg/min, Intravenous, Titrated      PRN Meds:  fentanyl citrate (PF), 50 mcg, Intravenous, Q2H PRN  sodium chloride (PF), 3 mL, Intravenous, Q1H PRN    Network Utilization Review Department  ATTENTION: Please call with any questions or concerns to [phone] and carefully listen to the prompts so that you are directed to the right person  All voicemails are confidential   Ida Sood all requests for admission clinical reviews, approved or denied determinations and any other requests to dedicated fax number below belonging to the campus where the patient is receiving treatment   List of dedicated fax numbers for the Facilities:  [address] DENIALS (Administrative/Medical Necessity) [phone]   [address] (Maternity/NICU/Pediatrics) [phone] [address] [address] [facility] Laura Robles 4851 (Fred Sheehan) [address] [facility] [address] [place] [address]   [address]  [place] [address] [phone]

## 2021-01-13 ENCOUNTER — TRANSITIONAL CARE MANAGEMENT (OUTPATIENT)
Dept: FAMILY MEDICINE CLINIC | Facility: CLINIC | Age: 61
End: 2021-01-13

## 2021-01-13 ENCOUNTER — HOSPITAL ENCOUNTER (INPATIENT)
Facility: HOSPITAL | Age: 61
LOS: 43 days | DRG: 853 | End: 2021-02-25
Attending: ANESTHESIOLOGY | Admitting: GENERAL PRACTICE
Payer: COMMERCIAL

## 2021-01-13 ENCOUNTER — ANESTHESIA (INPATIENT)
Dept: PERIOP | Facility: HOSPITAL | Age: 61
DRG: 853 | End: 2021-01-13
Payer: COMMERCIAL

## 2021-01-13 ENCOUNTER — APPOINTMENT (INPATIENT)
Dept: RADIOLOGY | Facility: HOSPITAL | Age: 61
DRG: 853 | End: 2021-01-13
Payer: COMMERCIAL

## 2021-01-13 ENCOUNTER — ANESTHESIA EVENT (INPATIENT)
Dept: PERIOP | Facility: HOSPITAL | Age: 61
DRG: 853 | End: 2021-01-13
Payer: COMMERCIAL

## 2021-01-13 VITALS
SYSTOLIC BLOOD PRESSURE: 124 MMHG | WEIGHT: 176.15 LBS | OXYGEN SATURATION: 96 % | HEART RATE: 58 BPM | RESPIRATION RATE: 33 BRPM | BODY MASS INDEX: 26.7 KG/M2 | TEMPERATURE: 99.3 F | HEIGHT: 68 IN | DIASTOLIC BLOOD PRESSURE: 58 MMHG

## 2021-01-13 VITALS — HEART RATE: 70 BPM

## 2021-01-13 DIAGNOSIS — I74.10 AORTIC THROMBUS (HCC): Primary | ICD-10-CM

## 2021-01-13 DIAGNOSIS — E27.40 ADRENAL INSUFFICIENCY (HCC): ICD-10-CM

## 2021-01-13 DIAGNOSIS — E11.42 TYPE 2 DIABETES MELLITUS WITH DIABETIC POLYNEUROPATHY, UNSPECIFIED WHETHER LONG TERM INSULIN USE (HCC): ICD-10-CM

## 2021-01-13 DIAGNOSIS — I82.411 ACUTE DEEP VEIN THROMBOSIS (DVT) OF FEMORAL VEIN OF RIGHT LOWER EXTREMITY (HCC): ICD-10-CM

## 2021-01-13 DIAGNOSIS — I99.8 LIMB ISCHEMIA: ICD-10-CM

## 2021-01-13 DIAGNOSIS — J12.82 PNEUMONIA DUE TO COVID-19 VIRUS: ICD-10-CM

## 2021-01-13 DIAGNOSIS — N17.9 ACUTE KIDNEY INJURY (HCC): ICD-10-CM

## 2021-01-13 DIAGNOSIS — E87.2 METABOLIC ACIDOSIS, INCREASED ANION GAP: ICD-10-CM

## 2021-01-13 DIAGNOSIS — J93.9 PNEUMOTHORAX ON LEFT: ICD-10-CM

## 2021-01-13 DIAGNOSIS — L98.491 CALLOUS ULCER, LIMITED TO BREAKDOWN OF SKIN (HCC): ICD-10-CM

## 2021-01-13 DIAGNOSIS — I63.9 CVA (CEREBRAL VASCULAR ACCIDENT) (HCC): ICD-10-CM

## 2021-01-13 DIAGNOSIS — G93.41 ACUTE METABOLIC ENCEPHALOPATHY: ICD-10-CM

## 2021-01-13 DIAGNOSIS — I99.8 LOWER LIMB ISCHEMIA: ICD-10-CM

## 2021-01-13 DIAGNOSIS — J38.3 VOCAL CORD DYSFUNCTION: ICD-10-CM

## 2021-01-13 DIAGNOSIS — J94.8 HYDROPNEUMOTHORAX: ICD-10-CM

## 2021-01-13 DIAGNOSIS — J96.01 ACUTE RESPIRATORY FAILURE WITH HYPOXIA (HCC): ICD-10-CM

## 2021-01-13 DIAGNOSIS — U07.1 PNEUMONIA DUE TO COVID-19 VIRUS: ICD-10-CM

## 2021-01-13 PROBLEM — B18.2 CHRONIC HEPATITIS C WITHOUT HEPATIC COMA (HCC): Chronic | Status: ACTIVE | Noted: 2017-02-27

## 2021-01-13 PROBLEM — F11.20 METHADONE MAINTENANCE THERAPY PATIENT (HCC): Chronic | Status: ACTIVE | Noted: 2017-04-13

## 2021-01-13 PROBLEM — R11.2 NAUSEA AND VOMITING: Status: RESOLVED | Noted: 2020-06-17 | Resolved: 2021-01-13

## 2021-01-13 PROBLEM — E11.69 HYPERLIPIDEMIA ASSOCIATED WITH TYPE 2 DIABETES MELLITUS (HCC): Chronic | Status: ACTIVE | Noted: 2017-04-13

## 2021-01-13 PROBLEM — R11.15 NON-INTRACTABLE CYCLICAL VOMITING WITH NAUSEA: Status: RESOLVED | Noted: 2018-10-04 | Resolved: 2021-01-13

## 2021-01-13 PROBLEM — E78.2 MIXED HYPERLIPIDEMIA: Chronic | Status: ACTIVE | Noted: 2019-11-07

## 2021-01-13 PROBLEM — E78.5 HYPERLIPIDEMIA ASSOCIATED WITH TYPE 2 DIABETES MELLITUS (HCC): Chronic | Status: ACTIVE | Noted: 2017-04-13

## 2021-01-13 PROBLEM — Z00.00 ANNUAL PHYSICAL EXAM: Status: RESOLVED | Noted: 2020-03-12 | Resolved: 2021-01-13

## 2021-01-13 PROBLEM — I10 ESSENTIAL HYPERTENSION: Chronic | Status: ACTIVE | Noted: 2017-04-13

## 2021-01-13 PROBLEM — T73.2XXA FATIGUE DUE TO EXPOSURE: Status: RESOLVED | Noted: 2017-04-14 | Resolved: 2021-01-13

## 2021-01-13 LAB
ALBUMIN SERPL BCP-MCNC: 1.4 G/DL (ref 3.5–5)
ALP SERPL-CCNC: 77 U/L (ref 46–116)
ALT SERPL W P-5'-P-CCNC: 33 U/L (ref 12–78)
ANION GAP SERPL CALCULATED.3IONS-SCNC: 10 MMOL/L (ref 4–13)
ANION GAP SERPL CALCULATED.3IONS-SCNC: 6 MMOL/L (ref 4–13)
ANION GAP SERPL CALCULATED.3IONS-SCNC: 7 MMOL/L (ref 4–13)
ANION GAP SERPL CALCULATED.3IONS-SCNC: 8 MMOL/L (ref 4–13)
ANISOCYTOSIS BLD QL SMEAR: PRESENT
ANISOCYTOSIS BLD QL SMEAR: PRESENT
APTT PPP: 113 SECONDS (ref 23–37)
APTT PPP: 39 SECONDS (ref 23–37)
APTT PPP: >210 SECONDS (ref 23–37)
AST SERPL W P-5'-P-CCNC: 77 U/L (ref 5–45)
BACTERIA UR CULT: NORMAL
BASE EXCESS BLDA CALC-SCNC: -4.8 MMOL/L
BASE EXCESS BLDA CALC-SCNC: -6.7 MMOL/L
BASOPHILS # BLD MANUAL: 0 THOUSAND/UL (ref 0–0.1)
BASOPHILS # BLD MANUAL: 0 THOUSAND/UL (ref 0–0.1)
BASOPHILS NFR MAR MANUAL: 0 % (ref 0–1)
BASOPHILS NFR MAR MANUAL: 0 % (ref 0–1)
BILIRUB SERPL-MCNC: 0.5 MG/DL (ref 0.2–1)
BODY TEMPERATURE: 99.3 DEGREES FEHRENHEIT
BUN SERPL-MCNC: 28 MG/DL (ref 5–25)
BUN SERPL-MCNC: 29 MG/DL (ref 5–25)
BUN SERPL-MCNC: 30 MG/DL (ref 5–25)
BUN SERPL-MCNC: 30 MG/DL (ref 5–25)
BURR CELLS BLD QL SMEAR: PRESENT
BURR CELLS BLD QL SMEAR: PRESENT
CALCIUM ALBUM COR SERPL-MCNC: 9.1 MG/DL (ref 8.3–10.1)
CALCIUM SERPL-MCNC: 6.9 MG/DL (ref 8.3–10.1)
CALCIUM SERPL-MCNC: 7 MG/DL (ref 8.3–10.1)
CALCIUM SERPL-MCNC: 7.1 MG/DL (ref 8.3–10.1)
CALCIUM SERPL-MCNC: 7.2 MG/DL (ref 8.3–10.1)
CHLORIDE SERPL-SCNC: 120 MMOL/L (ref 100–108)
CHLORIDE SERPL-SCNC: 121 MMOL/L (ref 100–108)
CHLORIDE SERPL-SCNC: 122 MMOL/L (ref 100–108)
CHLORIDE SERPL-SCNC: 124 MMOL/L (ref 100–108)
CO2 SERPL-SCNC: 20 MMOL/L (ref 21–32)
CO2 SERPL-SCNC: 21 MMOL/L (ref 21–32)
CO2 SERPL-SCNC: 21 MMOL/L (ref 21–32)
CO2 SERPL-SCNC: 23 MMOL/L (ref 21–32)
CREAT SERPL-MCNC: 0.67 MG/DL (ref 0.6–1.3)
CREAT SERPL-MCNC: 0.75 MG/DL (ref 0.6–1.3)
CREAT SERPL-MCNC: 0.78 MG/DL (ref 0.6–1.3)
CREAT SERPL-MCNC: 0.83 MG/DL (ref 0.6–1.3)
CRP SERPL QL: 279.4 MG/L
D DIMER PPP FEU-MCNC: 18.65 UG/ML FEU
DACRYOCYTES BLD QL SMEAR: PRESENT
DACRYOCYTES BLD QL SMEAR: PRESENT
EOSINOPHIL # BLD MANUAL: 0 THOUSAND/UL (ref 0–0.4)
EOSINOPHIL # BLD MANUAL: 0 THOUSAND/UL (ref 0–0.4)
EOSINOPHIL NFR BLD MANUAL: 0 % (ref 0–6)
EOSINOPHIL NFR BLD MANUAL: 0 % (ref 0–6)
ERYTHROCYTE [DISTWIDTH] IN BLOOD BY AUTOMATED COUNT: 14.3 % (ref 11.6–15.1)
ERYTHROCYTE [DISTWIDTH] IN BLOOD BY AUTOMATED COUNT: 14.4 % (ref 11.6–15.1)
ERYTHROCYTE [DISTWIDTH] IN BLOOD BY AUTOMATED COUNT: 14.4 % (ref 11.6–15.1)
FERRITIN SERPL-MCNC: 1608 NG/ML (ref 8–388)
GFR SERPL CREATININE-BSD FRML MDRD: 103 ML/MIN/1.73SQ M
GFR SERPL CREATININE-BSD FRML MDRD: 94 ML/MIN/1.73SQ M
GFR SERPL CREATININE-BSD FRML MDRD: 97 ML/MIN/1.73SQ M
GFR SERPL CREATININE-BSD FRML MDRD: 98 ML/MIN/1.73SQ M
GLUCOSE SERPL-MCNC: 108 MG/DL (ref 65–140)
GLUCOSE SERPL-MCNC: 114 MG/DL (ref 65–140)
GLUCOSE SERPL-MCNC: 133 MG/DL (ref 65–140)
GLUCOSE SERPL-MCNC: 141 MG/DL (ref 65–140)
GLUCOSE SERPL-MCNC: 170 MG/DL (ref 65–140)
GLUCOSE SERPL-MCNC: 172 MG/DL (ref 65–140)
GLUCOSE SERPL-MCNC: 172 MG/DL (ref 65–140)
GLUCOSE SERPL-MCNC: 174 MG/DL (ref 65–140)
GLUCOSE SERPL-MCNC: 184 MG/DL (ref 65–140)
GLUCOSE SERPL-MCNC: 190 MG/DL (ref 65–140)
GLUCOSE SERPL-MCNC: 222 MG/DL (ref 65–140)
GLUCOSE SERPL-MCNC: 223 MG/DL (ref 65–140)
GLUCOSE SERPL-MCNC: 233 MG/DL (ref 65–140)
HCO3 BLDA-SCNC: 15.8 MMOL/L (ref 22–28)
HCO3 BLDA-SCNC: 18.6 MMOL/L (ref 22–28)
HCT VFR BLD AUTO: 26.5 % (ref 36.5–49.3)
HCT VFR BLD AUTO: 27 % (ref 36.5–49.3)
HCT VFR BLD AUTO: 27.2 % (ref 36.5–49.3)
HCT VFR BLD AUTO: 29.2 % (ref 36.5–49.3)
HGB BLD-MCNC: 8.9 G/DL (ref 12–17)
HGB BLD-MCNC: 9.1 G/DL (ref 12–17)
HGB BLD-MCNC: 9.1 G/DL (ref 12–17)
HGB BLD-MCNC: 9.6 G/DL (ref 12–17)
HGB BLD-MCNC: 9.7 G/DL (ref 12–17)
HOROWITZ INDEX BLDA+IHG-RTO: 50 MM[HG]
LACTATE SERPL-SCNC: 1.7 MMOL/L (ref 0.5–2)
LYMPHOCYTES # BLD AUTO: 0.15 THOUSAND/UL (ref 0.6–4.47)
LYMPHOCYTES # BLD AUTO: 0.2 THOUSAND/UL (ref 0.6–4.47)
LYMPHOCYTES # BLD AUTO: 2 % (ref 14–44)
LYMPHOCYTES # BLD AUTO: 3 % (ref 14–44)
MAGNESIUM SERPL-MCNC: 2.1 MG/DL (ref 1.6–2.6)
MAGNESIUM SERPL-MCNC: 2.2 MG/DL (ref 1.6–2.6)
MAGNESIUM SERPL-MCNC: 2.3 MG/DL (ref 1.6–2.6)
MCH RBC QN AUTO: 28.5 PG (ref 26.8–34.3)
MCH RBC QN AUTO: 28.6 PG (ref 26.8–34.3)
MCH RBC QN AUTO: 29.3 PG (ref 26.8–34.3)
MCHC RBC AUTO-ENTMCNC: 32.7 G/DL (ref 31.4–37.4)
MCHC RBC AUTO-ENTMCNC: 33.2 G/DL (ref 31.4–37.4)
MCHC RBC AUTO-ENTMCNC: 34.3 G/DL (ref 31.4–37.4)
MCV RBC AUTO: 85 FL (ref 82–98)
MCV RBC AUTO: 86 FL (ref 82–98)
MCV RBC AUTO: 88 FL (ref 82–98)
METAMYELOCYTES NFR BLD MANUAL: 3 % (ref 0–1)
MICROCYTES BLD QL AUTO: PRESENT
MICROCYTES BLD QL AUTO: PRESENT
MONOCYTES # BLD AUTO: 0.15 THOUSAND/UL (ref 0–1.22)
MONOCYTES # BLD AUTO: 0.2 THOUSAND/UL (ref 0–1.22)
MONOCYTES NFR BLD: 2 % (ref 4–12)
MONOCYTES NFR BLD: 3 % (ref 4–12)
MRSA NOSE QL CULT: NORMAL
MYELOCYTES NFR BLD MANUAL: 2 % (ref 0–1)
NEUTROPHILS # BLD MANUAL: 5.97 THOUSAND/UL (ref 1.85–7.62)
NEUTROPHILS # BLD MANUAL: 7.34 THOUSAND/UL (ref 1.85–7.62)
NEUTS BAND NFR BLD MANUAL: 14 % (ref 0–8)
NEUTS BAND NFR BLD MANUAL: 23 % (ref 0–8)
NEUTS SEG NFR BLD AUTO: 66 % (ref 43–75)
NEUTS SEG NFR BLD AUTO: 82 % (ref 43–75)
NRBC BLD AUTO-RTO: 0 /100 WBCS
O2 CT BLDA-SCNC: 13.1 ML/DL (ref 16–23)
O2 CT BLDA-SCNC: 13.5 ML/DL (ref 16–23)
OXYHGB MFR BLDA: 92.7 % (ref 94–97)
OXYHGB MFR BLDA: 97.5 % (ref 94–97)
PCO2 BLDA: 22.4 MM HG (ref 36–44)
PCO2 BLDA: 28.9 MM HG (ref 36–44)
PEEP RESPIRATORY: 6 CM[H2O]
PH BLDA: 7.43 [PH] (ref 7.35–7.45)
PH BLDA: 7.46 [PH] (ref 7.35–7.45)
PHOSPHATE SERPL-MCNC: 2 MG/DL (ref 2.3–4.1)
PHOSPHATE SERPL-MCNC: 2.1 MG/DL (ref 2.3–4.1)
PHOSPHATE SERPL-MCNC: 2.6 MG/DL (ref 2.3–4.1)
PLATELET # BLD AUTO: 34 THOUSANDS/UL (ref 149–390)
PLATELET # BLD AUTO: 41 THOUSANDS/UL (ref 149–390)
PLATELET # BLD AUTO: 44 THOUSANDS/UL (ref 149–390)
PLATELET BLD QL SMEAR: ABNORMAL
PMV BLD AUTO: 11.1 FL (ref 8.9–12.7)
PMV BLD AUTO: 11.6 FL (ref 8.9–12.7)
PMV BLD AUTO: 11.6 FL (ref 8.9–12.7)
PO2 BLDA: 109.7 MM HG (ref 75–129)
PO2 BLDA: 67.7 MM HG (ref 75–129)
POIKILOCYTOSIS BLD QL SMEAR: PRESENT
POIKILOCYTOSIS BLD QL SMEAR: PRESENT
POTASSIUM SERPL-SCNC: 4.3 MMOL/L (ref 3.5–5.3)
POTASSIUM SERPL-SCNC: 4.6 MMOL/L (ref 3.5–5.3)
POTASSIUM SERPL-SCNC: 4.6 MMOL/L (ref 3.5–5.3)
POTASSIUM SERPL-SCNC: 4.7 MMOL/L (ref 3.5–5.3)
PROCALCITONIN SERPL-MCNC: 1.64 NG/ML
PROT SERPL-MCNC: 5.2 G/DL (ref 6.4–8.2)
RBC # BLD AUTO: 3.11 MILLION/UL (ref 3.88–5.62)
RBC # BLD AUTO: 3.11 MILLION/UL (ref 3.88–5.62)
RBC # BLD AUTO: 3.4 MILLION/UL (ref 3.88–5.62)
RBC MORPH BLD: NORMAL
RBC MORPH BLD: PRESENT
RBC MORPH BLD: PRESENT
SIMV VENT INSPIRED AIR FIO2: 55
SIMV VENT PEEP: 6
SIMV VENT TIDAL VOLUME: 500
SIMV VENT: ABNORMAL
SODIUM SERPL-SCNC: 148 MMOL/L (ref 136–145)
SODIUM SERPL-SCNC: 151 MMOL/L (ref 136–145)
SODIUM SERPL-SCNC: 151 MMOL/L (ref 136–145)
SODIUM SERPL-SCNC: 153 MMOL/L (ref 136–145)
SPECIMEN SOURCE: ABNORMAL
SPECIMEN SOURCE: ABNORMAL
TOTAL CELLS COUNTED SPEC: 100
TOXIC GRANULES BLD QL SMEAR: PRESENT
VENT AC: 20
VENT SIMV: 15
VENT- AC: AC
VT SETTING VENT: 450 ML
WBC # BLD AUTO: 10.3 THOUSAND/UL (ref 4.31–10.16)
WBC # BLD AUTO: 6.71 THOUSAND/UL (ref 4.31–10.16)
WBC # BLD AUTO: 7.65 THOUSAND/UL (ref 4.31–10.16)

## 2021-01-13 PROCEDURE — 85730 THROMBOPLASTIN TIME PARTIAL: CPT | Performed by: INTERNAL MEDICINE

## 2021-01-13 PROCEDURE — C1894 INTRO/SHEATH, NON-LASER: HCPCS | Performed by: SURGERY

## 2021-01-13 PROCEDURE — 82553 CREATINE MB FRACTION: CPT | Performed by: EMERGENCY MEDICINE

## 2021-01-13 PROCEDURE — NC001 PR NO CHARGE: Performed by: SURGERY

## 2021-01-13 PROCEDURE — 88304 TISSUE EXAM BY PATHOLOGIST: CPT | Performed by: PATHOLOGY

## 2021-01-13 PROCEDURE — 99291 CRITICAL CARE FIRST HOUR: CPT | Performed by: INTERNAL MEDICINE

## 2021-01-13 PROCEDURE — 82330 ASSAY OF CALCIUM: CPT

## 2021-01-13 PROCEDURE — 93970 EXTREMITY STUDY: CPT | Performed by: SURGERY

## 2021-01-13 PROCEDURE — C9113 INJ PANTOPRAZOLE SODIUM, VIA: HCPCS | Performed by: PHYSICIAN ASSISTANT

## 2021-01-13 PROCEDURE — 80048 BASIC METABOLIC PNL TOTAL CA: CPT | Performed by: EMERGENCY MEDICINE

## 2021-01-13 PROCEDURE — 82948 REAGENT STRIP/BLOOD GLUCOSE: CPT

## 2021-01-13 PROCEDURE — NC001 PR NO CHARGE: Performed by: NURSE PRACTITIONER

## 2021-01-13 PROCEDURE — 83735 ASSAY OF MAGNESIUM: CPT | Performed by: PHYSICIAN ASSISTANT

## 2021-01-13 PROCEDURE — 82803 BLOOD GASES ANY COMBINATION: CPT

## 2021-01-13 PROCEDURE — 82805 BLOOD GASES W/O2 SATURATION: CPT | Performed by: PSYCHIATRY & NEUROLOGY

## 2021-01-13 PROCEDURE — 85007 BL SMEAR W/DIFF WBC COUNT: CPT | Performed by: INTERNAL MEDICINE

## 2021-01-13 PROCEDURE — 83605 ASSAY OF LACTIC ACID: CPT | Performed by: PSYCHIATRY & NEUROLOGY

## 2021-01-13 PROCEDURE — 83605 ASSAY OF LACTIC ACID: CPT | Performed by: EMERGENCY MEDICINE

## 2021-01-13 PROCEDURE — 85027 COMPLETE CBC AUTOMATED: CPT | Performed by: STUDENT IN AN ORGANIZED HEALTH CARE EDUCATION/TRAINING PROGRAM

## 2021-01-13 PROCEDURE — 3066F NEPHROPATHY DOC TX: CPT | Performed by: FAMILY MEDICINE

## 2021-01-13 PROCEDURE — 34203 REMOVAL OF LEG ARTERY CLOT: CPT | Performed by: SURGERY

## 2021-01-13 PROCEDURE — 84100 ASSAY OF PHOSPHORUS: CPT | Performed by: PHYSICIAN ASSISTANT

## 2021-01-13 PROCEDURE — 04CK0ZZ EXTIRPATION OF MATTER FROM RIGHT FEMORAL ARTERY, OPEN APPROACH: ICD-10-PCS | Performed by: SURGERY

## 2021-01-13 PROCEDURE — 85018 HEMOGLOBIN: CPT | Performed by: PHYSICIAN ASSISTANT

## 2021-01-13 PROCEDURE — 84132 ASSAY OF SERUM POTASSIUM: CPT

## 2021-01-13 PROCEDURE — C1887 CATHETER, GUIDING: HCPCS | Performed by: SURGERY

## 2021-01-13 PROCEDURE — 85027 COMPLETE CBC AUTOMATED: CPT | Performed by: EMERGENCY MEDICINE

## 2021-01-13 PROCEDURE — 85014 HEMATOCRIT: CPT

## 2021-01-13 PROCEDURE — 94760 N-INVAS EAR/PLS OXIMETRY 1: CPT

## 2021-01-13 PROCEDURE — 04CM0ZZ EXTIRPATION OF MATTER FROM RIGHT POPLITEAL ARTERY, OPEN APPROACH: ICD-10-PCS | Performed by: SURGERY

## 2021-01-13 PROCEDURE — 85018 HEMOGLOBIN: CPT | Performed by: NURSE PRACTITIONER

## 2021-01-13 PROCEDURE — 86140 C-REACTIVE PROTEIN: CPT | Performed by: INTERNAL MEDICINE

## 2021-01-13 PROCEDURE — 94640 AIRWAY INHALATION TREATMENT: CPT

## 2021-01-13 PROCEDURE — NC001 PR NO CHARGE: Performed by: INTERNAL MEDICINE

## 2021-01-13 PROCEDURE — 85379 FIBRIN DEGRADATION QUANT: CPT | Performed by: INTERNAL MEDICINE

## 2021-01-13 PROCEDURE — 94150 VITAL CAPACITY TEST: CPT

## 2021-01-13 PROCEDURE — 71045 X-RAY EXAM CHEST 1 VIEW: CPT

## 2021-01-13 PROCEDURE — 99232 SBSQ HOSP IP/OBS MODERATE 35: CPT | Performed by: INTERNAL MEDICINE

## 2021-01-13 PROCEDURE — 85007 BL SMEAR W/DIFF WBC COUNT: CPT | Performed by: PHYSICIAN ASSISTANT

## 2021-01-13 PROCEDURE — 84145 PROCALCITONIN (PCT): CPT | Performed by: PHYSICIAN ASSISTANT

## 2021-01-13 PROCEDURE — 82947 ASSAY GLUCOSE BLOOD QUANT: CPT

## 2021-01-13 PROCEDURE — 75710 ARTERY X-RAYS ARM/LEG: CPT

## 2021-01-13 PROCEDURE — 85730 THROMBOPLASTIN TIME PARTIAL: CPT | Performed by: PHYSICIAN ASSISTANT

## 2021-01-13 PROCEDURE — 82728 ASSAY OF FERRITIN: CPT | Performed by: INTERNAL MEDICINE

## 2021-01-13 PROCEDURE — 85007 BL SMEAR W/DIFF WBC COUNT: CPT | Performed by: STUDENT IN AN ORGANIZED HEALTH CARE EDUCATION/TRAINING PROGRAM

## 2021-01-13 PROCEDURE — 94002 VENT MGMT INPAT INIT DAY: CPT

## 2021-01-13 PROCEDURE — 80053 COMPREHEN METABOLIC PANEL: CPT | Performed by: INTERNAL MEDICINE

## 2021-01-13 PROCEDURE — 99221 1ST HOSP IP/OBS SF/LOW 40: CPT | Performed by: SURGERY

## 2021-01-13 PROCEDURE — 82550 ASSAY OF CK (CPK): CPT | Performed by: EMERGENCY MEDICINE

## 2021-01-13 PROCEDURE — 34201 REMOVAL OF ARTERY CLOT: CPT | Performed by: SURGERY

## 2021-01-13 PROCEDURE — 85014 HEMATOCRIT: CPT | Performed by: PHYSICIAN ASSISTANT

## 2021-01-13 PROCEDURE — 80048 BASIC METABOLIC PNL TOTAL CA: CPT | Performed by: PHYSICIAN ASSISTANT

## 2021-01-13 PROCEDURE — 84295 ASSAY OF SERUM SODIUM: CPT

## 2021-01-13 PROCEDURE — C1769 GUIDE WIRE: HCPCS | Performed by: SURGERY

## 2021-01-13 PROCEDURE — 04CR0ZZ EXTIRPATION OF MATTER FROM RIGHT POSTERIOR TIBIAL ARTERY, OPEN APPROACH: ICD-10-PCS | Performed by: SURGERY

## 2021-01-13 PROCEDURE — 94003 VENT MGMT INPAT SUBQ DAY: CPT

## 2021-01-13 PROCEDURE — 5A1955Z RESPIRATORY VENTILATION, GREATER THAN 96 CONSECUTIVE HOURS: ICD-10-PCS | Performed by: INTERNAL MEDICINE

## 2021-01-13 PROCEDURE — 82805 BLOOD GASES W/O2 SATURATION: CPT | Performed by: EMERGENCY MEDICINE

## 2021-01-13 PROCEDURE — 85027 COMPLETE CBC AUTOMATED: CPT | Performed by: INTERNAL MEDICINE

## 2021-01-13 RX ORDER — HEPARIN SODIUM 1000 [USP'U]/ML
2800 INJECTION, SOLUTION INTRAVENOUS; SUBCUTANEOUS
Status: CANCELLED | OUTPATIENT
Start: 2021-01-13

## 2021-01-13 RX ORDER — DEXAMETHASONE SODIUM PHOSPHATE 10 MG/ML
0.1 INJECTION, SOLUTION INTRAMUSCULAR; INTRAVENOUS EVERY 12 HOURS
Status: CANCELLED | OUTPATIENT
Start: 2021-01-13 | End: 2021-01-22

## 2021-01-13 RX ORDER — LEVALBUTEROL 1.25 MG/.5ML
1.25 SOLUTION, CONCENTRATE RESPIRATORY (INHALATION) EVERY 6 HOURS
Status: CANCELLED | OUTPATIENT
Start: 2021-01-13

## 2021-01-13 RX ORDER — ATORVASTATIN CALCIUM 40 MG/1
40 TABLET, FILM COATED ORAL
Status: CANCELLED | OUTPATIENT
Start: 2021-01-13

## 2021-01-13 RX ORDER — SODIUM CHLORIDE 9 MG/ML
3 INJECTION INTRAVENOUS
Status: DISCONTINUED | OUTPATIENT
Start: 2021-01-13 | End: 2021-01-15

## 2021-01-13 RX ORDER — LEVALBUTEROL 1.25 MG/.5ML
1.25 SOLUTION, CONCENTRATE RESPIRATORY (INHALATION) EVERY 6 HOURS
Status: DISCONTINUED | OUTPATIENT
Start: 2021-01-13 | End: 2021-01-18

## 2021-01-13 RX ORDER — MELATONIN
2000 DAILY
Status: CANCELLED | OUTPATIENT
Start: 2021-01-14

## 2021-01-13 RX ORDER — MELATONIN
2000 DAILY
Status: DISCONTINUED | OUTPATIENT
Start: 2021-01-14 | End: 2021-02-25 | Stop reason: HOSPADM

## 2021-01-13 RX ORDER — ROCURONIUM BROMIDE 10 MG/ML
INJECTION, SOLUTION INTRAVENOUS AS NEEDED
Status: DISCONTINUED | OUTPATIENT
Start: 2021-01-13 | End: 2021-01-13

## 2021-01-13 RX ORDER — DEXTROSE MONOHYDRATE 50 MG/ML
50 INJECTION, SOLUTION INTRAVENOUS CONTINUOUS
Status: CANCELLED | OUTPATIENT
Start: 2021-01-13

## 2021-01-13 RX ORDER — DEXAMETHASONE SODIUM PHOSPHATE 10 MG/ML
0.1 INJECTION, SOLUTION INTRAMUSCULAR; INTRAVENOUS EVERY 12 HOURS
Status: DISCONTINUED | OUTPATIENT
Start: 2021-01-13 | End: 2021-01-22

## 2021-01-13 RX ORDER — MULTIVITAMIN/IRON/FOLIC ACID 18MG-0.4MG
1 TABLET ORAL DAILY
Status: CANCELLED | OUTPATIENT
Start: 2021-01-19

## 2021-01-13 RX ORDER — ASCORBIC ACID 500 MG
1000 TABLET ORAL EVERY 12 HOURS SCHEDULED
Status: DISPENSED | OUTPATIENT
Start: 2021-01-13 | End: 2021-01-18

## 2021-01-13 RX ORDER — HEPARIN SODIUM 10000 [USP'U]/100ML
3-30 INJECTION, SOLUTION INTRAVENOUS
Status: CANCELLED | OUTPATIENT
Start: 2021-01-13

## 2021-01-13 RX ORDER — SODIUM CHLORIDE 30 MG/ML INHALATION SOLUTION 30 MG/ML
4 SOLUTION INHALANT
Status: CANCELLED | OUTPATIENT
Start: 2021-01-13

## 2021-01-13 RX ORDER — PROPOFOL 10 MG/ML
5-50 INJECTION, EMULSION INTRAVENOUS
Status: DISCONTINUED | OUTPATIENT
Start: 2021-01-13 | End: 2021-01-14

## 2021-01-13 RX ORDER — FENTANYL CITRATE 50 UG/ML
50 INJECTION, SOLUTION INTRAMUSCULAR; INTRAVENOUS EVERY 2 HOUR PRN
Status: DISCONTINUED | OUTPATIENT
Start: 2021-01-13 | End: 2021-01-15

## 2021-01-13 RX ORDER — CHLORHEXIDINE GLUCONATE 0.12 MG/ML
15 RINSE ORAL EVERY 12 HOURS SCHEDULED
Status: CANCELLED | OUTPATIENT
Start: 2021-01-13

## 2021-01-13 RX ORDER — ASCORBIC ACID 500 MG
1000 TABLET ORAL EVERY 12 HOURS SCHEDULED
Status: CANCELLED | OUTPATIENT
Start: 2021-01-13 | End: 2021-01-18

## 2021-01-13 RX ORDER — SODIUM CHLORIDE 9 MG/ML
3 INJECTION INTRAVENOUS
Status: CANCELLED | OUTPATIENT
Start: 2021-01-13

## 2021-01-13 RX ORDER — ALBUMIN, HUMAN INJ 5% 5 %
SOLUTION INTRAVENOUS CONTINUOUS PRN
Status: DISCONTINUED | OUTPATIENT
Start: 2021-01-13 | End: 2021-01-13

## 2021-01-13 RX ORDER — ZINC SULFATE 50(220)MG
220 CAPSULE ORAL DAILY
Status: COMPLETED | OUTPATIENT
Start: 2021-01-14 | End: 2021-01-18

## 2021-01-13 RX ORDER — HEPARIN SODIUM 1000 [USP'U]/ML
2800 INJECTION, SOLUTION INTRAVENOUS; SUBCUTANEOUS
Status: DISCONTINUED | OUTPATIENT
Start: 2021-01-13 | End: 2021-01-14

## 2021-01-13 RX ORDER — FENTANYL CITRATE-0.9 % NACL/PF 10 MCG/ML
100 PLASTIC BAG, INJECTION (ML) INTRAVENOUS CONTINUOUS
Status: CANCELLED | OUTPATIENT
Start: 2021-01-13

## 2021-01-13 RX ORDER — DEXTROSE MONOHYDRATE 50 MG/ML
50 INJECTION, SOLUTION INTRAVENOUS CONTINUOUS
Status: DISCONTINUED | OUTPATIENT
Start: 2021-01-13 | End: 2021-01-13 | Stop reason: HOSPADM

## 2021-01-13 RX ORDER — ZINC SULFATE 50(220)MG
220 CAPSULE ORAL DAILY
Status: CANCELLED | OUTPATIENT
Start: 2021-01-14 | End: 2021-01-19

## 2021-01-13 RX ORDER — HEPARIN SODIUM 1000 [USP'U]/ML
5600 INJECTION, SOLUTION INTRAVENOUS; SUBCUTANEOUS
Status: CANCELLED | OUTPATIENT
Start: 2021-01-13

## 2021-01-13 RX ORDER — ALBUMIN, HUMAN INJ 5% 5 %
SOLUTION INTRAVENOUS
Status: COMPLETED
Start: 2021-01-13 | End: 2021-01-13

## 2021-01-13 RX ORDER — CHLORHEXIDINE GLUCONATE 0.12 MG/ML
15 RINSE ORAL EVERY 12 HOURS SCHEDULED
Status: DISCONTINUED | OUTPATIENT
Start: 2021-01-13 | End: 2021-01-25

## 2021-01-13 RX ORDER — CEFAZOLIN SODIUM 1 G/3ML
INJECTION, POWDER, FOR SOLUTION INTRAMUSCULAR; INTRAVENOUS AS NEEDED
Status: DISCONTINUED | OUTPATIENT
Start: 2021-01-13 | End: 2021-01-13

## 2021-01-13 RX ORDER — HEPARIN SODIUM 10000 [USP'U]/100ML
3-30 INJECTION, SOLUTION INTRAVENOUS
Status: DISCONTINUED | OUTPATIENT
Start: 2021-01-13 | End: 2021-01-14

## 2021-01-13 RX ORDER — SODIUM CHLORIDE 30 MG/ML INHALATION SOLUTION 30 MG/ML
4 SOLUTION INHALANT
Status: DISCONTINUED | OUTPATIENT
Start: 2021-01-13 | End: 2021-01-25

## 2021-01-13 RX ORDER — ALBUMIN, HUMAN INJ 5% 5 %
12.5 SOLUTION INTRAVENOUS ONCE
Status: COMPLETED | OUTPATIENT
Start: 2021-01-13 | End: 2021-01-13

## 2021-01-13 RX ORDER — HEPARIN SODIUM 1000 [USP'U]/ML
5600 INJECTION, SOLUTION INTRAVENOUS; SUBCUTANEOUS
Status: DISCONTINUED | OUTPATIENT
Start: 2021-01-13 | End: 2021-01-14

## 2021-01-13 RX ORDER — PROPOFOL 10 MG/ML
5-50 INJECTION, EMULSION INTRAVENOUS
Status: CANCELLED | OUTPATIENT
Start: 2021-01-13

## 2021-01-13 RX ORDER — FENTANYL CITRATE-0.9 % NACL/PF 10 MCG/ML
150 PLASTIC BAG, INJECTION (ML) INTRAVENOUS CONTINUOUS
Status: DISCONTINUED | OUTPATIENT
Start: 2021-01-13 | End: 2021-01-15

## 2021-01-13 RX ORDER — DEXTROSE MONOHYDRATE 50 MG/ML
50 INJECTION, SOLUTION INTRAVENOUS CONTINUOUS
Status: DISCONTINUED | OUTPATIENT
Start: 2021-01-13 | End: 2021-01-13

## 2021-01-13 RX ORDER — DEXMEDETOMIDINE 100 UG/ML
.1-.7 INJECTION, SOLUTION, CONCENTRATE INTRAVENOUS
Status: CANCELLED | OUTPATIENT
Start: 2021-01-13

## 2021-01-13 RX ORDER — FENTANYL CITRATE 50 UG/ML
50 INJECTION, SOLUTION INTRAMUSCULAR; INTRAVENOUS EVERY 2 HOUR PRN
Status: CANCELLED | OUTPATIENT
Start: 2021-01-13

## 2021-01-13 RX ORDER — ALBUMIN, HUMAN INJ 5% 5 %
25 SOLUTION INTRAVENOUS ONCE
Status: COMPLETED | OUTPATIENT
Start: 2021-01-13 | End: 2021-01-13

## 2021-01-13 RX ORDER — ATORVASTATIN CALCIUM 40 MG/1
40 TABLET, FILM COATED ORAL
Status: DISCONTINUED | OUTPATIENT
Start: 2021-01-13 | End: 2021-01-25

## 2021-01-13 RX ORDER — MULTIVITAMIN/IRON/FOLIC ACID 18MG-0.4MG
1 TABLET ORAL DAILY
Status: DISCONTINUED | OUTPATIENT
Start: 2021-01-19 | End: 2021-02-25 | Stop reason: HOSPADM

## 2021-01-13 RX ADMIN — PANTOPRAZOLE SODIUM 8 MG/HR: 40 INJECTION, POWDER, FOR SOLUTION INTRAVENOUS at 04:32

## 2021-01-13 RX ADMIN — HEPARIN SODIUM 18 UNITS/KG/HR: 10000 INJECTION, SOLUTION INTRAVENOUS at 15:00

## 2021-01-13 RX ADMIN — SODIUM CHLORIDE 0.7 MCG/KG/HR: 9 INJECTION, SOLUTION INTRAVENOUS at 05:14

## 2021-01-13 RX ADMIN — PROPOFOL 20 MCG/KG/MIN: 10 INJECTION, EMULSION INTRAVENOUS at 08:36

## 2021-01-13 RX ADMIN — ROCURONIUM BROMIDE 50 MG: 50 INJECTION, SOLUTION INTRAVENOUS at 19:48

## 2021-01-13 RX ADMIN — OXYCODONE HYDROCHLORIDE AND ACETAMINOPHEN 1000 MG: 500 TABLET ORAL at 08:27

## 2021-01-13 RX ADMIN — SODIUM CHLORIDE 0.7 MCG/KG/HR: 9 INJECTION, SOLUTION INTRAVENOUS at 08:24

## 2021-01-13 RX ADMIN — PHENYLEPHRINE HYDROCHLORIDE 200 MCG: 10 INJECTION INTRAVENOUS at 20:06

## 2021-01-13 RX ADMIN — SODIUM CHLORIDE SOLN NEBU 3% 4 ML: 3 NEBU SOLN at 07:32

## 2021-01-13 RX ADMIN — ALBUMIN (HUMAN) 12.5 G: 12.5 INJECTION, SOLUTION INTRAVENOUS at 15:32

## 2021-01-13 RX ADMIN — SODIUM CHLORIDE 0.5 MCG/KG/HR: 9 INJECTION, SOLUTION INTRAVENOUS at 15:00

## 2021-01-13 RX ADMIN — DEXAMETHASONE SODIUM PHOSPHATE 7.3 MG: 10 INJECTION, SOLUTION INTRAMUSCULAR; INTRAVENOUS at 08:44

## 2021-01-13 RX ADMIN — DOXYCYCLINE 100 MG: 100 INJECTION, POWDER, LYOPHILIZED, FOR SOLUTION INTRAVENOUS at 11:47

## 2021-01-13 RX ADMIN — ROCURONIUM BROMIDE 30 MG: 50 INJECTION, SOLUTION INTRAVENOUS at 21:47

## 2021-01-13 RX ADMIN — ALBUMIN (HUMAN) 25 G: 12.5 INJECTION, SOLUTION INTRAVENOUS at 10:14

## 2021-01-13 RX ADMIN — CEFAZOLIN 2000 MG: 1 INJECTION, POWDER, FOR SOLUTION INTRAVENOUS at 20:01

## 2021-01-13 RX ADMIN — Medication 2000 UNITS: at 08:27

## 2021-01-13 RX ADMIN — CHLORHEXIDINE GLUCONATE 0.12% ORAL RINSE 15 ML: 1.2 LIQUID ORAL at 08:27

## 2021-01-13 RX ADMIN — PHENYLEPHRINE HYDROCHLORIDE 200 MCG: 10 INJECTION INTRAVENOUS at 20:32

## 2021-01-13 RX ADMIN — ALBUMIN, HUMAN INJ 5% 12.5 G: 5 SOLUTION at 15:32

## 2021-01-13 RX ADMIN — PHENYLEPHRINE HYDROCHLORIDE 30 MCG/MIN: 10 INJECTION INTRAVENOUS at 20:20

## 2021-01-13 RX ADMIN — DEXTROSE 125 ML/HR: 5 SOLUTION INTRAVENOUS at 03:07

## 2021-01-13 RX ADMIN — LEVALBUTEROL HYDROCHLORIDE 1.25 MG: 1.25 SOLUTION, CONCENTRATE RESPIRATORY (INHALATION) at 07:32

## 2021-01-13 RX ADMIN — FENTANYL CITRATE 50 MCG: 50 INJECTION INTRAMUSCULAR; INTRAVENOUS at 15:32

## 2021-01-13 RX ADMIN — SODIUM CHLORIDE 3 UNITS/HR: 9 INJECTION, SOLUTION INTRAVENOUS at 10:55

## 2021-01-13 RX ADMIN — CEFTRIAXONE SODIUM 1000 MG: 10 INJECTION, POWDER, FOR SOLUTION INTRAVENOUS at 08:27

## 2021-01-13 RX ADMIN — LEVALBUTEROL HYDROCHLORIDE 1.25 MG: 1.25 SOLUTION, CONCENTRATE RESPIRATORY (INHALATION) at 02:42

## 2021-01-13 RX ADMIN — Medication 100 MCG/HR: at 01:12

## 2021-01-13 RX ADMIN — SODIUM CHLORIDE SOLN NEBU 3% 4 ML: 3 NEBU SOLN at 02:43

## 2021-01-13 RX ADMIN — ALBUMIN (HUMAN): 12.5 INJECTION, SOLUTION INTRAVENOUS at 22:10

## 2021-01-13 RX ADMIN — ZINC SULFATE 220 MG (50 MG) CAPSULE 220 MG: CAPSULE at 08:27

## 2021-01-13 RX ADMIN — ALBUMIN (HUMAN): 12.5 INJECTION, SOLUTION INTRAVENOUS at 22:29

## 2021-01-13 RX ADMIN — SODIUM CHLORIDE 2 UNITS/HR: 9 INJECTION, SOLUTION INTRAVENOUS at 15:36

## 2021-01-13 RX ADMIN — SODIUM CHLORIDE 8 MG/HR: 9 INJECTION, SOLUTION INTRAVENOUS at 16:01

## 2021-01-13 RX ADMIN — Medication 100 MCG/HR: at 21:55

## 2021-01-13 RX ADMIN — HEPARIN SODIUM 5600 UNITS: 1000 INJECTION INTRAVENOUS; SUBCUTANEOUS at 12:18

## 2021-01-13 RX ADMIN — PROPOFOL 25 MCG/KG/MIN: 10 INJECTION, EMULSION INTRAVENOUS at 15:30

## 2021-01-13 NOTE — NURSING NOTE
Pt has had purple discoloration to the right second toe since admit, however, at this point right great toe is now also purple  Wound picture taken, CC AP made aware

## 2021-01-13 NOTE — PROGRESS NOTES
[facility] - Inpatient Progress Note  Debo Eddy 56 y o  male MRN: [id number]  Encounter: [id number]          ASSESSMENT AND PLAN:      1  Possible UGIB:  Patient with active COVID-19 infection, concern for episode of emesis with possible old blood/coffee-ground material after heparin drip started for COVID protocol  Hemoglobin stable today, despite primary team resuming anticoagulation without significant bloody output via OG  - maintain 2 large bore IVs  - maintain active T&S  - trend H&H Q8H  - please transfuse for Hgb <7  - continue protonix gtt  - close hemodynamic monitoring; please notify GI team if any hemodynamic instability, worsening bleeding, or drop in Hgb that is refractory to transfusion (consider more urgent EGD if so)  - avoid NSAIDs  - outpatient endoscopy once recovers from COVID    Recommnedations d/w Dr Valery Regalado, Critical Care   ______________________________________________________________________    SUBJECTIVE:  Pt remtains intubated and sedated  Per d/w Critical Care team, anticoagulation resumed, no significant bloody output via OG  Hgb stable  Historical Information   Past Medical History:   Diagnosis Date    Diabetes mellitus ([facility] )     GERD (gastroesophageal reflux disease)     Hypercholesteremia     Hypertension     Methadone use ([place] [address] )      No past surgical history on file    Social History   Social History     Substance and Sexual Activity   Alcohol Use No     Social History     Substance and Sexual Activity   Drug Use No    Comment: methadone     Social History     Tobacco Use   Smoking Status Former Smoker   Smokeless Tobacco Never Used     Family History   Problem Relation Age of Onset    Diabetes Mother     Hypertension Father     Leukemia Father     Acute lymphoblastic leukemia Father     Cancer Sister        Meds/Allergies       Current Facility-Administered Medications:     ascorbic acid (VITAMIN C) tablet 1,000 mg, 1,000 mg, Oral, Q12H [address], 1,000 mg at 01/13/21 0827    atorvastatin (LIPITOR) tablet 40 mg, 40 mg, Oral, HS, 40 mg at 01/12/21 2246    cefTRIAXone (ROCEPHIN) 1,000 mg in dextrose 5 % 50 mL IVPB, 1,000 mg, Intravenous, Q24H, 1,000 mg at 01/13/21 0827    chlorhexidine (PERIDEX) 0 12 % oral rinse 15 mL, 15 mL, Swish & Spit, Q12H [address], 15 mL at 01/13/21 0827    cholecalciferol (VITAMIN D3) tablet 2,000 Units, 2,000 Units, Oral, Daily, 2,000 Units at 01/13/21 0827    dexamethasone (PF) (DECADRON) injection 7 3 mg, 0 1 mg/kg, Intravenous, Q12H, 7 3 mg at 01/13/21 0844    dexmedetomidine (PRECEDEX) 400 mcg in sodium chloride 0 9% 100 ml IVPB, 0 1-0 7 mcg/kg/hr, Intravenous, Titrated, 0 7 mcg/kg/hr at 01/13/21 0824    dextrose 5 % infusion, 50 mL/hr, Intravenous, Continuous, 50 mL/hr at 01/13/21 0954    doxycycline (VIBRAMYCIN) 100 mg in sodium chloride 0 9 % 100 mL IVPB, 100 mg, Intravenous, Q12H, 100 mg at 01/13/21 1147    fentaNYL 1000 mcg in sodium chloride 0 9% 100mL infusion, 100 mcg/hr, Intravenous, Continuous, 100 mcg/hr at 01/13/21 0112    fentanyl citrate (PF) 100 MCG/2ML 50 mcg, 50 mcg, Intravenous, Q2H PRN, 50 mcg at 01/12/21 0751    heparin (porcine) 25,000 units in 0 45% NaCl 250 mL infusion (premix), 3-30 Units/kg/hr (Order-Specific), Intravenous, Titrated, 15 Units/kg/hr at 01/13/21 0230    heparin (porcine) injection 2,800 Units, 2,800 Units, Intravenous, Q1H PRN    heparin (porcine) injection 5,600 Units, 5,600 Units, Intravenous, Q1H PRN    insulin regular (HumuLIN R,NovoLIN R) 1 Units/mL in sodium chloride 0 9 % 100 mL infusion, 0 3-21 Units/hr, Intravenous, Titrated, 3 Units/hr at 01/13/21 1055    levalbuterol (XOPENEX) inhalation solution 1 25 mg, 1 25 mg, Nebulization, Q6H, 1 25 mg at 01/13/21 0732    zinc sulfate (ZINCATE) capsule 220 mg, 220 mg, Oral, Daily, 220 mg at 01/13/21 0827 **FOLLOWED BY** [START ON 1/19/2021] multivitamin-minerals (CENTRUM ADULTS) tablet 1 tablet, 1 tablet, Oral, Daily    pantoprazole (PROTONIX) 80 mg in sodium chloride 0 9 % 100 mL infusion, 8 mg/hr, Intravenous, Continuous, 8 mg/hr at 01/13/21 0432    propofol (DIPRIVAN) 1000 mg in 100 mL infusion (premix), 5-50 mcg/kg/min, Intravenous, Titrated, 20 mcg/kg/min at 01/13/21 0836    [COMPLETED] Insert peripheral IV, , , Once **AND** sodium chloride (PF) 0 9 % injection 3 mL, 3 mL, Intravenous, Q1H PRN    sodium chloride 3 % inhalation solution 4 mL, 4 mL, Nebulization, Q6H, 4 mL at 01/13/21 0732    Allergies   Allergen Reactions    Varenicline        Objective     Blood pressure 127/66, pulse 69, temperature 99 3 °F (37 4 °C), temperature source Axillary, resp  rate (!) 26, height 5' 8" (1 727 m), weight 79 9 kg (176 lb 2 4 oz), SpO2 95 %  Body mass index is 26 78 kg/m²  PHYSICAL EXAM:    [ exam limited 2/2 active COVID-19 infection ]  General Appearance:   Intubated and sedated   HEENT:   Normocephalic, atraumatic, anicteric    Neck:  Supple, symmetrical, trachea midline   Lungs:   Respirations even and unlabored on ventilator   Abdomen:   Soft, non-tender, non-distended; normal bowel sounds; no masses, no organomegaly    Genitalia:   Deferred    Rectal:   Deferred    Skin:  No jaundice, rashes, or lesions visualized          Lab Results:   No results displayed because visit has over 200 results  Radiology Results:   Xr Chest 1 View Portable    Result Date: 1/11/2021  Narrative: CHEST INDICATION:   weakness  Patient has confirmed COVID-19  COMPARISON:  1/6/2021 EXAM PERFORMED/VIEWS:  XR CHEST PORTABLE FINDINGS: Cardiomediastinal silhouette appears unremarkable  There has been some progression of groundglass opacities of the lungs from the most recent prior  No pleural fluid or pneumothorax Osseous structures appear within normal limits for patient age       Impression: Worsening groundglass opacities of the lungs from Covid 19 pneumonia Workstation performed: [id number]     Xr Chest 1 View Portable    Result Date: 1/6/2021  Narrative: CHEST INDICATION:   sob  Patient has confirmed COVID-19  Positive on 12/31/2020  COMPARISON:  Chest radiograph from 1/3/2021  EXAM PERFORMED/VIEWS:  XR CHEST PORTABLE FINDINGS: Cardiomediastinal silhouette appears unremarkable  Worsening bilateral groundglass opacity  No effusion or pneumothorax  Osseous structures appear within normal limits for patient age  Impression: Slight worsening of bilateral groundglass opacity due to Covid 19 pneumonia  Workstation performed: [id number]     Xr Chest 1 View Portable    Result Date: 1/4/2021  Narrative: CHEST INDICATION:   cough  Patient has confirmed COVID-19  Positive on 12/31/2020  COMPARISON:  Chest radiograph from 12/31/2020  EXAM PERFORMED/VIEWS:  XR CHEST PORTABLE FINDINGS: Cardiomediastinal silhouette appears unremarkable  Mild bilateral groundglass opacity  No effusion or pneumothorax  Osseous structures appear within normal limits for patient age  Impression: Mild bilateral groundglass opacity compatible with Covid 19 pneumonia  Workstation performed: [id number]     Xr Chest 1 View Portable    Result Date: 12/31/2020  Narrative: CHEST INDICATION:   vomiting, cp  COMPARISON:  Two-view chest 5/21/2020 EXAM PERFORMED/VIEWS:  XR CHEST PORTABLE FINDINGS: Cardiomediastinal silhouette appears unremarkable  Mild right basilar airspace disease suspicious for pneumonia  Osseous structures appear within normal limits for patient age  Impression: Mild right basilar airspace disease suspicious for pneumonia; Covid pneumonia can have this appearance  The study was marked in [facility] for immediate notification  Workstation performed: [id number]     Ct Head Wo Contrast    Result Date: 1/11/2021  Narrative: CT BRAIN - WITHOUT CONTRAST INDICATION:   Altered mental status AMS  COMPARISON:  None  TECHNIQUE:  CT examination of the brain was performed    In addition to axial images, sagittal and coronal 2D reformatted images were created and submitted for interpretation  Radiation dose length product (DLP) for this visit:  924 mGy-cm   This examination, like all CT scans performed in the [facility], was performed utilizing techniques to minimize radiation dose exposure, including the use of iterative reconstruction and automated exposure control  IMAGE QUALITY:  Diagnostic  FINDINGS: PARENCHYMA:  No intracranial mass, mass effect or midline shift  No CT signs of acute infarction  No acute parenchymal hemorrhage  VENTRICLES AND EXTRA-AXIAL SPACES:  Normal for the patient's age  VISUALIZED ORBITS AND PARANASAL SINUSES:  No acute abnormality involving the orbits  Mild scattered sinus mucosal thickening is noted  No fluid levels are seen  CALVARIUM AND EXTRACRANIAL SOFT TISSUES:  Normal      Impression: No acute intracranial abnormality  Workstation performed: [id number]     Cta Chest Ct Abdomen Pelvis W Contrast    Result Date: 1/12/2021  Narrative: CT PULMONARY ANGIOGRAM OF THE CHEST AND CT ABDOMEN AND PELVIS WITH INTRAVENOUS CONTRAST INDICATION:   Acute Respiratory failure / Coffee ground emesis / r/o PE as DDimer >8 0  Patient has confirmed COVID-19  COMPARISON:  None  TECHNIQUE:  CT examination of the chest, abdomen and pelvis was performed  Thin section CT angiographic technique was used in the chest in order to evaluate for pulmonary embolus and coronal 3D MIP postprocessing was performed on the acquisition scanner  Axial, sagittal, and coronal 2D reformatted images were created from the source data and submitted for interpretation  Radiation dose length product (DLP) for this visit:  1007 mGy-cm   This examination, like all CT scans performed in the [facility], was performed utilizing techniques to minimize radiation dose exposure, including the use of iterative reconstruction and automated exposure control   IV Contrast:  100 mL of iohexol (OMNIPAQUE) Enteric Contrast:  Enteric contrast was not administered  FINDINGS: CHEST PULMONARY ARTERIAL TREE:  Single nonocclusive embolic filling defect seen straddling a bifurcation of left lower lobe segmental artery (image 2/131)  Otherwise, some heterogeneity within the subsegmental branches, inconclusive for additional emboli  Motion through the lower lung zones  No evidence for pulmonary arterial dilatation  LUNGS:  Large areas of bilateral groundglass infiltrate mostly throughout the posterior dependent aspects of the upper lobes, middle lobe and lingula and diffusely throughout both lower lobes  Small rounded lucencies seen in the background, may represent edema on a background of centrilobular emphysema  Correlate for evidence of pulmonary edema/ARDS versus disseminated infection  In the setting of clinically suspected/proven COVID-19, the above lung parenchymal findings on CT indicate intermediate confidence level for COVID-19  ET tube present in satisfactory position  PLEURA:  Unremarkable  HEART/AORTA:  Unremarkable for patient's age  MEDIASTINUM AND IBETH:  Enlarged subcarinal lymph node, 1 4 cm short axis dimension  CHEST WALL AND LOWER NECK:   Unremarkable  ABDOMEN LIVER/BILIARY TREE:  Unremarkable  GALLBLADDER:  There are gallstone(s) within the gallbladder, without pericholecystic inflammatory changes  SPLEEN:  Unremarkable  PANCREAS:  Unremarkable  ADRENAL GLANDS:  Unremarkable  KIDNEYS/URETERS:  Unremarkable  No hydronephrosis  STOMACH AND BOWEL:  NG tube seen traversing to the level of the stomach, coiling back on itself, with the tip reentering the GE junction and residing in the distal esophagus  Recommend retracting several cm for the tip to return to the stomach  There is mild circumferential thickening/mural edema within the 2nd and 3rd portion of the duodenum raising suspicion for duodenitis  No discrete ulceration or filling defect evident  Remainder of the small bowel and colon otherwise within normal limits   APPENDIX:  No findings to suggest appendicitis  ABDOMINOPELVIC CAVITY:  Mild ascites  No loculated collections  No pneumoperitoneum  No lymphadenopathy  VESSELS:  Unremarkable for patient's age  PELVIS REPRODUCTIVE ORGANS:  Unremarkable for patient's age  URINARY BLADDER:  Veras catheter present  Nondistended  ABDOMINAL WALL/INGUINAL REGIONS:  Unremarkable  OSSEOUS STRUCTURES:  No acute fracture or destructive osseous lesion  Incidental note made of bilateral pars spondylolysis at L5 without evidence for spondylolisthesis  Moderate degenerative disc disease at L4-5  Impression: Single small left lower pulmonary embolic filling defect identified as above  generalized diffuse bilateral groundglass infiltrates, intermediate probability for COVID related finding  Consider alternative etiologies  Recommend repositioning NG tube as detailed above  Traverses to the level of the stomach but tip resides in the distal esophagus  Mural thickening/edema through the duodenal sweep suspicious for duodenitis  The study was marked in [facility] for immediate notification  Workstation performed: [id number]     Xr Chest Portable Icu    Result Date: 1/12/2021  Narrative: CHEST INDICATION:   s/p central line  COMPARISON:  Same day chest x-ray  EXAM PERFORMED/VIEWS:  XR CHEST PORTABLE ICU FINDINGS:  Right IJ approach central venous catheter terminates in the distal SVC  No change to enteric and endotracheal tube position  Cardiomediastinal silhouette appears unremarkable  Diffuse groundglass opacities  No pneumothorax or pleural effusion  Osseous structures appear within normal limits for patient age  Impression: Appropriate position of right central venous catheter  Grossly stable exam otherwise  Workstation performed: [id number]     Xr Chest Portable Icu    Result Date: 1/12/2021  Narrative: CHEST INDICATION:   ETT placement   Patient has confirmed COVID-19   COMPARISON:  1/11/2021 EXAM PERFORMED/VIEWS:  XR CHEST PORTABLE ICU FINDINGS: The tip of the endotracheal tube projects 4 cm above the bessie  The tip of the enteric tube projects gastroesophageal junction, retrograde  Cardiomediastinal silhouette appears unremarkable  Redemonstrated are diffuse groundglass opacities throughout both lungs  No new focal lung opacity  No pleural effusion or pneumothorax  Osseous structures appear within normal limits for patient age  Impression: Findings again consistent with COVID-19 pneumonia, not significantly changed  Tip of the enteric tube projects retrograde at the gastroesophageal junction, coiled in the stomach  Workstation performed: [id number]     Vas Lower Limb Venous Duplex Study, Complete Bilateral    Result Date: 1/13/2021  Narrative:  [facility] REPORT CLINICAL: Indications: Patient presents with bilateral leg swelling  Patient is positive Covid-9 with an elevated D-dimer  Was on heparin and had complications and is no longer on a blood thinner  Operative History: Patient denies any cardiovascular procedures Risk Factors The patient has history of HTN, Diabetes, HLD and previous smoking (quit >10yrs ago)  FINDINGS:  Right    Impression              FV Prox  Non Occlusive Thrombus  FV Mid   Non Occlusive Thrombus  FV Dist  Non Occlusive Thrombus     CONCLUSION:  Impression: RIGHT LOWER LIMB: Limited Evidence of acute non-occlusive deep vein thrombosis noted in the proximal-distal femoral vein  LEFT LOWER LIMB: Limited No gross evidence of acute deep vein thrombosis in the CFV, FV, and Popliteal Vein  Limited study performed  Technical findings were given to Dr Fior Bazzi via tiger text  SIGNATURE: Electronically Signed by: Cece Conklin on 2021-01-13 07:40:56 AM      The patient was seen and examined by Dr Krissy Devries, all johnson medical decisions were made with Dr Krissy Devries  Thank you for allowing us to participate in the care of this pleasant patient  We will follow up with you closely

## 2021-01-13 NOTE — CONSULTS
H&P Exam - Vascular Surgery   Mallory Quevedo 56 y o  male MRN: [id number]  Unit/Bed#: ICU 12 Encounter: [id number]    Assessment/Plan     Assessment:  57 yo M with respiratory failure due to COVID, R femoral non-occlusive DVT, upper GI bleeding, developed acute RLE with unclear onset time, probable around 24 hours ago  He is sinus rhythm but CTA yesterday showed mural thrombus in abdominal aorta  Plan:  Continue anticoagulation  Will discuss further management    History of Present Illness     HPI:  Mallory Quevedo is a 56 y o  male with history of DM, HTN, methadone use without previous vascular problems was hospitalized in ICU for respiratory failure due to COVID, DKA, and upper GI bleeding  He originally presented to hospital with hematoemesis 2 days ago  Also he was diagnosed with COVID, for which he was intubated yesterday  Additionally he was diagnosed with non occlusive R femoral DVT, and he has been on anticoagulation therapy since yesterday  No ongoing bleeding currently  Per [facility] resident, his R foot has been pale since after intubation without pulses  The vascular surgery was contacted regarding pale R foot on 1/13/21  Review of Systems   Unable to perform ROS: Intubated       Historical Information   Past Medical History:   Diagnosis Date    Diabetes mellitus ([facility] [address] )     GERD (gastroesophageal reflux disease)     Hypercholesteremia     Hypertension     Methadone use ([facility] )      No past surgical history on file    Social History   Social History     Substance and Sexual Activity   Alcohol Use No     Social History     Substance and Sexual Activity   Drug Use No    Comment: methadone     Social History     Tobacco Use   Smoking Status Former Smoker   Smokeless Tobacco Never Used     E-Cigarette/Vaping    E-Cigarette Use Never User      E-Cigarette/Vaping Substances    Nicotine No     THC No     CBD No     Flavoring No     Other No     Unknown No      Family History: non-contributory    Meds/Allergies   all current active meds have been reviewed  Allergies   Allergen Reactions    Varenicline        Objective   Vitals: Blood pressure 127/66, pulse 69, temperature 99 3 °F (37 4 °C), temperature source Axillary, resp  rate (!) 26, height 5' 8" (1 727 m), weight 79 9 kg (176 lb 2 4 oz), SpO2 95 %  ,Body mass index is 26 78 kg/m²  Intake/Output Summary (Last 24 hours) at 1/13/2021 1138  Last data filed at 1/13/2021 1000  Gross per 24 hour   Intake 6442 11 ml   Output 1965 ml   Net 4477 11 ml     Invasive Devices     Central Venous Catheter Line            CVC Central Lines 01/12/21 Triple Right less than 1 day          Peripheral Intravenous Line            Peripheral IV 01/11/21 Left Antecubital 1 day    Peripheral IV 01/11/21 Left Wrist 1 day    Peripheral IV 01/12/21 Left;Upper Arm 1 day    Peripheral IV 01/12/21 Right Forearm 1 day          Arterial Line            Arterial Line 01/12/21 Radial less than 1 day          Drain            Urethral Catheter 18 Fr  -- days    NG/OG/Enteral Tube Orogastric 16 Fr 1 day          Airway            ETT  Oral;Hi-Lo; Cuffed 7 5 mm 1 day                Physical Exam  Vitals signs and nursing note reviewed  Constitutional:       Appearance: Normal appearance  HENT:      Head: Normocephalic  Right Ear: External ear normal       Left Ear: External ear normal       Nose: Nose normal       Mouth/Throat:      Mouth: Mucous membranes are moist       Pharynx: Oropharynx is clear  Eyes:      Conjunctiva/sclera: Conjunctivae normal       Pupils: Pupils are equal, round, and reactive to light  Neck:      Musculoskeletal: Normal range of motion  Cardiovascular:      Rate and Rhythm: Normal rate and regular rhythm  Pulses: Normal pulses  Heart sounds: Normal heart sounds  Pulmonary:      Effort: Pulmonary effort is normal       Breath sounds: Normal breath sounds  Abdominal:      General: Abdomen is flat   Bowel sounds are normal       Palpations: Abdomen is soft  Musculoskeletal:      Comments: RLE: pale, unable to access sensory and motor function due to mental status  Femoral not palpable but has doppler signal  No signal in popliteal, PT/DP    LLE: warm  Femoral palpable, good signal in PT/DP   Skin:     Capillary Refill: Capillary refill takes more than 3 seconds  Coloration: Skin is pale  Neurological:      Mental Status: He is alert  Comments: GCS3    Psychiatric:      Comments: Unable to access         Lab Results: I have personally reviewed pertinent reports  Imaging: I have personally reviewed pertinent reports  EKG, Pathology, and Other Studies: I have personally reviewed pertinent reports  Code Status: Level 1 - Full Code  Advance Directive and Living Will:      Power of :    POLST:      Counseling / Coordination of Care  Counseling/Coordination of Care: Total floor / unit time spent today 30 minutes  Greater than 50% of total time was spent with the patient and / or family counseling and / or coordination of care  A description of the counseling / coordination of care: Norma Stevens

## 2021-01-13 NOTE — EMTALA/ACUTE CARE TRANSFER
[address] [address]  Dept: [phone]      ACUTE CARE TRANSFER CONSENT    NAME Vignesh Kelly                                         1960                              MRN [id number]    I have been informed of my rights regarding examination, treatment, and transfer   by Dr Juliano Hampton MD    Benefits: Specialized equipment and/or services available at the receiving facility (Include comment)________________________    Risks: Potential for delay in receiving treatment, Potential deterioration of medical condition, Loss of IV, Increased discomfort during transfer, Possible worsening of condition or death during transfer      Consent for Transfer:  I acknowledge that my medical condition has been evaluated and explained to me by the treating physician or other qualified medical person and/or my attending physician, who has recommended that I be transferred to the service of  Accepting Physician: Drake Razo at [address] Name, [address] : [facility]  The above potential benefits of such transfer, the potential risks associated with such transfer, and the probable risks of not being transferred have been explained to me, and I fully understand them  The doctor has explained that, in my case, the benefits of transfer outweigh the risks  I agree to be transferred  I authorize the performance of emergency medical procedures and treatments upon me in both transit and upon arrival at the receiving facility  Additionally, I authorize the release of any and all medical records to the receiving facility and request they be transported with me, if possible  I understand that the safest mode of transportation during a medical emergency is an ambulance and that the Hospital advocates the use of this mode of transport   Risks of traveling to the receiving facility by car, including absence of medical control, life sustaining equipment, such as oxygen, and medical personnel has been explained to me and I fully understand them  (HARDEEP CORRECT BOX BELOW)  [  ]  I consent to the stated transfer and to be transported by ambulance/helicopter  [  ]  I consent to the stated transfer, but refuse transportation by ambulance and accept full responsibility for my transportation by car  I understand the risks of non-ambulance transfers and I exonerate the Hospital and its staff from any deterioration in my condition that results from this refusal     X___________________________________________    DATE  21  TIME________  Signature of patient or legally responsible individual signing on patient behalf           RELATIONSHIP TO PATIENT_________________________          Provider Certification    NAME Betzy Shankar                                         1960                              MRN [id number]    A medical screening exam was performed on the above named patient    Based on the examination:    Condition Necessitating Transfer  Aortic Thrombus need for vascular surgery/ hybrid OR    Patient Condition: The patient has been stabilized such that within reasonable medical probability, no material deterioration of the patient condition or the condition of the unborn child(perez) is likely to result from the transfer    Reason for Transfer: Level of Care needed not available at this facility    Transfer Requirements: [facility]   · Space available and qualified personnel available for treatment as acknowledged by [phone]  · Agreed to accept transfer and to provide appropriate medical treatment as acknowledged by       Silvia  · Appropriate medical records of the examination and treatment of the patient are provided at the time of transfer   [address], [address] _______  · Transfer will be performed by qualified personnel from [facility]  and appropriate transfer equipment as required, including the use of necessary and appropriate life support measures  Provider Certification: I have examined the patient and explained the following risks and benefits of being transferred/refusing transfer to the patient/family:  General risk, such as traffic hazards, adverse weather conditions, rough terrain or turbulence, possible failure of equipment (including vehicle or aircraft), or consequences of actions of persons outside the control of the transport personnel, Unanticipated needs of medical equipment and personnel during transport, The possibility of a transport vehicle being unavailable, Risk of worsening condition      Based on these reasonable risks and benefits to the patient and/or the unborn child(perez), and based upon the information available at the time of the patients examination, I certify that the medical benefits reasonably to be expected from the provision of appropriate medical treatments at another medical facility outweigh the increasing risks, if any, to the individuals medical condition, and in the case of labor to the unborn child, from effecting the transfer      X____________________________________________ DATE 01/13/21        TIME_______      ORIGINAL - SEND TO MEDICAL RECORDS   COPY - SEND WITH PATIENT DURING TRANSFER

## 2021-01-13 NOTE — CONSULTS
Consultation - Vascular Surgery   Quinton Chavez 56 y o  male MRN: [id number]  [facility]: [id number]      Assessment/Plan      Assessment:  56 y o  COVID19+, with PMHx of DMT2, HTN, HLD, Hep C, who presents with an aortic thrombus and acute limb ischemia to RLE likely Nelson Class IIB VS III although difficult to assess 2/2 intubation/sedation     RLE is cool from knee distally  Palpable femoral pulse, absent pulses/signals distally  Plan:  Likely aortic thrombus seen on CT scan has migrated distally to right femoral bifurcation  Unsure if lower limb is salvageable at this time  Will attempt revascularization with femoral embolectomy emergently this evening  Risks, benefits, alternatives discussed with patient's son over the phone  History of Present Illness   Physician Requesting Consult: Jeremy Carver DO  Reason for Consult / Principal Problem: RLE acute limb ischemia   History, ROS and PFSH are limited 2/2 intubation/sedation   HPI: Quinton Chavez is a 56y o  year old male COVID19+, with PMHx of DMT2, HTN, HLD, Hep C, who was transferred from [facility] for RLE ischemia  Patient was intubated yesterday 2/2 acute hypoxic respiratory failure form COVID19 pneumonia and PE  Since intubation yesterday, his RLE was noted to be cool from the knee distally  Patient was transferred to [facility] for vascular surgery intervention  Remainder of history is limited at this time 2/2 emergency of situation and intubation/sedation  Inpatient consult to Vascular Surgery     Performed by  Elana Aleman DO     Authorized by Gregg Heath PA-C              Review of Systems   Unable to perform ROS: Intubated   Skin: Negative for color change and rash         Historical Information   Past Medical History:   Diagnosis Date    [facility]     GERD (gastroesophageal reflux disease)     Hypercholesteremia     Hypertension     [facility]      No past surgical history on file    Social History   Social History     Substance and Sexual Activity   Alcohol Use No     Social History     Substance and Sexual Activity   Drug Use No    Comment: methadone     E-Cigarette/Vaping    E-Cigarette Use Never User      E-Cigarette/Vaping Substances    Nicotine No     THC No     CBD No     Flavoring No     Other No     Unknown No      Social History     Tobacco Use   Smoking Status Former Smoker   Smokeless Tobacco Never Used     Family History: non-contributory}    Meds/Allergies   current meds:   Current Facility-Administered Medications   Medication Dose Route Frequency    ascorbic acid (VITAMIN C) tablet 1,000 mg  1,000 mg Oral Q12H [address]    atorvastatin (LIPITOR) tablet 40 mg  40 mg Oral HS    [START ON 1/14/2021] cefTRIAXone (ROCEPHIN) 1,000 mg in dextrose 5 % 50 mL IVPB  1,000 mg Intravenous Q24H    chlorhexidine (PERIDEX) 0 12 % oral rinse 15 mL  15 mL Swish & Spit Q12H [address]    [START ON 1/14/2021] cholecalciferol (VITAMIN D3) tablet 2,000 Units  2,000 Units Oral Daily    dexamethasone (PF) (DECADRON) injection 7 3 mg  0 1 mg/kg Intravenous Q12H    dexmedetomidine (PRECEDEX) 400 mcg in sodium chloride 0 9 % 100 mL infusion  0 1-0 7 mcg/kg/hr Intravenous Titrated    doxycycline (VIBRAMYCIN) 100 mg in sodium chloride 0 9 % 100 mL IVPB  100 mg Intravenous Q12H    fentaNYL 1000 mcg in sodium chloride 0 9% 100mL infusion  100 mcg/hr Intravenous Continuous    fentanyl citrate (PF) 100 MCG/2ML 50 mcg  50 mcg Intravenous Q2H PRN    heparin (porcine) 25,000 units in 0 45% NaCl 250 mL infusion (premix)  3-30 Units/kg/hr (Order-Specific) Intravenous Titrated    heparin (porcine) injection 2,800 Units  2,800 Units Intravenous Q1H PRN    heparin (porcine) injection 5,600 Units  5,600 Units Intravenous Q1H PRN    insulin regular (HumuLIN R,NovoLIN R) 1 Units/mL in sodium chloride 0 9 % 100 mL infusion  0 3-21 Units/hr Intravenous Titrated    levalbuterol (Theta Silvia) inhalation solution 1 25 mg  1 25 mg Nebulization Q6H    [START ON 1/14/2021] zinc sulfate (ZINCATE) capsule 220 mg  220 mg Oral Daily    Followed by   Shawna Garcia ON 1/19/2021] multivitamin-minerals (CENTRUM ADULTS) tablet 1 tablet  1 tablet Oral Daily    pantoprazole (PROTONIX) 80 mg in sodium chloride 0 9 % 100 mL infusion  8 mg/hr Intravenous Continuous    propofol (DIPRIVAN) 1000 mg in 100 mL infusion (premix)  5-50 mcg/kg/min Intravenous Titrated    sodium chloride (PF) 0 9 % injection 3 mL  3 mL Intravenous Q1H PRN    sodium chloride 3 % inhalation solution 4 mL  4 mL Nebulization Q6H     Allergies   Allergen Reactions    Varenicline        Objective   Vitals: Pulse 70 ,There is no height or weight on file to calculate BMI  No intake or output data in the 24 hours ending 01/13/21 1644  Invasive Devices     Central Venous Catheter Line            CVC Central Lines 01/12/21 Triple Right 1 day          Peripheral Intravenous Line            Peripheral IV 01/11/21 Left Antecubital 2 days    Peripheral IV 01/11/21 Left Wrist 2 days    Peripheral IV 01/12/21 Left;Upper Arm 1 day    Peripheral IV 01/12/21 Right Forearm 1 day          Arterial Line            Arterial Line 01/12/21 Radial 1 day          Drain            Urethral Catheter 18 Fr  -- days    NG/OG/Enteral Tube Orogastric 16 Fr 1 day          Airway            ETT  Oral;Hi-Lo; Cuffed 7 5 mm 1 day                Physical Exam  Vitals signs and nursing note reviewed  Exam conducted with a chaperone present  Constitutional:       Appearance: He is well-developed  HENT:      Head: Normocephalic and atraumatic  Eyes:      Conjunctiva/sclera: Conjunctivae normal       Pupils: Pupils are equal, round, and reactive to light  Neck:      Musculoskeletal: Neck supple  Cardiovascular:      Rate and Rhythm: Normal rate and regular rhythm  Heart sounds: No murmur  Comments: 2+ palpable femoral, DP/PT pulses on LLE     Pulmonary:      Breath sounds: Normal breath sounds  Comments: Intubated  Abdominal:      Palpations: Abdomen is soft  Tenderness: There is no abdominal tenderness  Skin:     General: Skin is dry  Comments: RLE with clear demarcation in temperature at the level of the knee - cool from knee distally  Neurological:      Mental Status: He is alert  Comments: Sedation held during exam  Patient able to wiggle toes on L, but not on R  Unable to assess sensory exam 2/2 intubation          Lab Results:   Coags:   Lab Results   Component Value Date    PTT 39 (H) 01/13/2021    INR 1 70 (H) 01/12/2021   , CBC with diff:   Lab Results   Component Value Date    WBC 6 71 01/13/2021    HGB 9 7 (L) 01/13/2021    HCT 29 2 (L) 01/13/2021    MCV 86 01/13/2021    PLT 44 (LL) 01/13/2021    MCH 28 5 01/13/2021    MCHC 33 2 01/13/2021    RDW 14 3 01/13/2021    MPV 11 1 01/13/2021    NRBC 0 01/13/2021   , BMP/CMP:   Lab Results   Component Value Date    SODIUM 148 (H) 01/13/2021    K 4 3 01/13/2021     (H) 01/13/2021    CO2 20 (L) 01/13/2021    BUN 28 (H) 01/13/2021    CREATININE 0 67 01/13/2021    CALCIUM 6 9 (L) 01/13/2021    AST 77 (H) 01/13/2021    ALT 33 01/13/2021    ALKPHOS 77 01/13/2021    EGFR 103 01/13/2021     Imaging Studies: I have personally reviewed pertinent reports  EKG, Pathology, and Other Studies: I have personally reviewed pertinent reports  VTE Prophylaxis: Heparin     Code Status: Level 1 - Full Code  Advance Directive and Living Will:      Power of :    POLST:      Counseling / Coordination of Care  Counseling/Coordination of Care: Total floor / unit time spent today discussion with patient's family and surgical team minutes  Greater than 50% of total time was spent with the patient and / or family counseling and / or coordination of care   A description of the counseling / coordination of care: 20 mins

## 2021-01-13 NOTE — RESPIRATORY THERAPY NOTE
01/12/21 0559   Vent Information   Vent ID rental   Vent type Smith G5   Smith G5 Vent Mode (S)CMV   $ Vent Charge-INITIAL Yes   Ventilator Start Yes   $ Pulse Oximetry Spot Check Charge Completed   SpO2 98 %   (S)CMV Settings   Resp Rate (BPM) 16 BPM   VT (mL) 440 mL   FIO2 (%) 70 %   PEEP (cmH2O) 6 cmH2O   I:E Ratio 1:2 1   Flow Trigger (LPM) 3   Humidification Heater   Heater Temperature (Set) 98 6 °F (37 °C)   Pause Time (%) 0 %   (S)CMV Actuals   Resp Rate (BPM) 27 BPM   VT (mL) 457   MV 11 4   MAP (cmH2O) 15 cmH2O   Peak Pressure (cmH2O) 29 cmH2O   I:E Ratio (Obs) 1:2 1   Insp Resistance 48   Heater Temperature (Obs) 98 6 °F (37 °C)   Static Compliance (mL/cmH20) 14 7 mL/cmH2O   Plateau Pressure (cm H2O) 26 cm H2O   (S)CMV Alarms   High Peak Pressure (cmH2O) 45   Low Pressure (cmH2O) 5 cm H2O   High Resp Rate (BPM) 45 BPM   Low Resp Rate (BPM) 8 BPM   High MV (L/min) 18 L/min   Low MV (L/min) 4 L/min   High VT (mL) 900 mL   Low VT (mL) 250 mL   Leak (%) 0 %   Apnea Time (s) 20 S   Maintenance   Alarm (pink) cable attached No  (rental)   Resuscitation bag with peep valve at bedside Yes   Water bag changed No   Circuit changed No   ETT  Oral;Hi-Lo; Cuffed 7 5 mm   Placement Date/Time: 01/12/21 0500   Mask Ventilation: Ventilated by mask (1)  Preoxygenated: Yes  Type: Oral;Hi-Lo; Cuffed  Tube Size: 7 5 mm  Laryngoscope: GlideScope  Insertion attempts: 1  Placement Verification: Auscultation; Chest x-ray;End tidal      Secured at (cm) 25   Measured from Lips   Secured Location Right   Secured by Commercial tube zapata   Site Condition Dry   HI-LO Suction  Intermittent suction   HI-LO Secretions Moderate  (dk brown)   HI-LO Intervention Patent   RT Ventilator Management Note  Kathniteshelder Holder 56 y o  male MRN: [id number]  Unit/Bed#: ICU 12 Encounter: [id number]      Daily Screen       1/12/2021  0982             Patient safety screen outcome[de-identified]  Failed            Physical Exam:          Resp Comments: Arterial Line inserted right radial, positive allens test, sterile procedure technique, no complications, will cotninue to monitor

## 2021-01-13 NOTE — H&P
H&P Exam - Angeline Monse Carmen Moss [address] 56 y o  male MRN: [id number]  Unit/Bed#: MICU 11 Encounter: [id number]      -------------------------------------------------------------------------------------------------------------  Chief Complaint: Cold Lower Limb    History of Present Illness   HX and PE limited by: Intubation and sedation of patient  Swetha Agarwal is a 56 y o  male, with a history significant for diabetes mellitus type 2, hypertension, hyperlipidemia, hepatitis-C, GERD, coronavirus diagnosis on 12/31, who presents, as a transfer, for evaluation for vascular surgery due to acute limb ischemia of the right lower extremity  This is believed to be secondary to emboli from aortic thrombus to this limb  Patient initially presented to [facility] on 1/11 with nausea, vomiting, shortness of breath, altered mental status  Prior to arrival, patient was unable to eat, drink, or take medication secondary to his nausea/vomiting  He is found to be in DKA and he was subsequently transferred to [facility]  While hospitalized, patient had worsening respiratory status and was intubated on 01/12  Prior to this event, his hemoglobin drawn and he had developed hematemesis with "coffee-ground" appearing emesis  Patient was evaluated by gastroenterology; his hemoglobin stabilized and his OG tube showed no evidence of blood; he was cleared for anticoagulation from their standpoint  Furthermore, while hospitalized, patient developed hematuria and was evaluated by urology; they performed cystoscopy, cleared clots, and recommended that a 3 way Veras be placed and left in for 7 days  Notably, the patient's right lower extremity is pale and cool and vascular surgery was consulted  The patient was found to have acute limb ischemia, suspected to be secondary to emboli from aortic thrombus on CTA yesterday, and he was transferred for evaluation for potential surgical intervention      In regards to the DKA, the anion gap is closed and the patient's glucose has normalized  Gastroenterology, Urology, Vascular surgery have evaluated  Patient is currently being ventilated  CMV, 16, 460, 6, 60  Plateau pressure 28  Over the last day, patient's inflammatory markers have been increasing   from 01/08, D-dimer 18 65 from 8, procalcitonin 1 6 for from 0 31, ferritin 1608 from 1314  Patient also noted to have thrombocytopenia that predated initiation of heparin  Patient is also hypernatremic on presentation and it was rapidly corrected  Moderate bacteria seen on UA microscopy  Venous duplex on 01/12 revealed acute nonocclusive deep vein thrombosis of the right lower extremity  CTA on 01/12 chest abdomen pelvis revealed pulmonary emboli  History obtained from chart review    -------------------------------------------------------------------------------------------------------------  Assessment and Plan:    Neuro:    Diagnosis: Encephalopathy  o Plan:  ICU care, pain and sedation  o Goal RASS -2 to 0  - Sedation with propofol, Precedex, fentanyl  o CAM-ICU  o Sleep-Wake Cycle Regulation    CV:    Diagnosis:  Aortic thrombus, hypertension  o Plan:   o Goal map greater than 65  o Hold home BP meds  o Continue volume resuscitation  - Isolyte and albumin  o Continue to closely monitor hemodynamics  o Closely monitor on tele  o Continue heparin drip  o Vascular surgery consulted for repeat examination, potential operative intervention    Pulm:   Diagnosis:  Acute COVID-19 infection, acute hypoxic respiratory failure, abnormal CXR, Pulmonary embolism  o Plan:   o Patient intubated on 01/12 due to hypoxia  o Continue zinc, vitamin-D, vitamin-C, statin  o Severe COVID-19 pathway  o Remdesivir not given  o Plan to contact family for consent for convalescent plasma  o Good pulm hygene  o Frequent suctioning  o Continue ceftriaxone, DC doxycycline due to negative Legionella  o Goal tidal volume 410 cc:  Patient is over ventilated, decreased tidal volumes, increased respiratory rate, repeat blood gas 1 hour after this change      GI:    Diagnosis: GERD, Suspected Upper GI bleed  o Plan:   o No evidence of active bleeding  o Continue Protonix drip  o NPO except for meds  o Contact GI and endoscopy if blood in the OG-tube or signs and symptoms of GI bleeding  o Q6hour CBCs      :    Diagnosis:  Gross hematuria, ANNALEE that has resolved, UTI  o Plan:   o Moderate bacteria on urine microscopy  o Continue ceftriaxone  o Trend BMP  o Per Urology, maintain Veras catheter for 7 days  o Monitor for bleeding  o Strict I&Os       F/E/N:    Plan:    Fluids:  Isolate and albumin for resuscitation   Electrolytes: Trend and replete as needed   Nutrition:  NPO for now      Heme/Onc:    Diagnosis: Thrombocytopenia  o Plan:  o Predated initiation of heparin  o Peripheral smear  o Q6hour CBC       Endo:    Diagnosis: DMII, DKA - now resolved  o Plan:   - Continue ISS   - Maintain BS between 140-180  - Hold D5W due to rate of correction of hypernatremia      ID:    Diagnosis: No acute issues  o Plan:   o Continue to closely monitor for signs of infection  o Trend daily fever curve and WBC    MSK/Skin:    Diagnosis:   o Plan:   o Continue to turn and reposition frequently  o Continue pressure off-loading  o PT/OT      Disposition: Admit to Critical Care   Code Status: Level 1 - Full Code  --------------------------------------------------------------------------------------------------------------  Review of Systems   Unable to perform ROS: Intubated (Intubated and sedated)       Review of systems was unable to be performed secondary to Patient is intubated and sedated    Physical Exam  Vitals signs reviewed  Constitutional:       Appearance: He is ill-appearing  HENT:      Head: Normocephalic        Right Ear: External ear normal       Left Ear: External ear normal       Nose: Nose normal  Mouth/Throat:      Mouth: Mucous membranes are moist    Eyes:      Pupils: Pupils are equal, round, and reactive to light  Neck:      Musculoskeletal: Neck supple  Comments: Right IJ present  Cardiovascular:      Rate and Rhythm: Normal rate  Heart sounds: No murmur  No friction rub  No gallop  Pulmonary:      Breath sounds: Rhonchi (Predominantly inspiratory, diffuse) present  Comments: On ventilator  Abdominal:      Palpations: Abdomen is soft  Musculoskeletal:      Right lower leg: Edema present  Comments: Cold distal right lower extremity below the knee  His right lower extremity is warm above the knee  DP pulse absent in the right lower extremity  2+ in the left  Skin:     General: Skin is dry  Capillary Refill: Capillary refill takes less than 2 seconds  Comments: See MSK   Neurological:      Comments: Patient is intubated and sedated making assessment of this component of the physical exam difficult  PERRL   Psychiatric:      Comments: Patient is intubated and sedated making assessment of this component of the physical exam difficult         --------------------------------------------------------------------------------------------------------------  Vitals: There were no vitals filed for this visit  Temp  Min: 95 4 °F (35 2 °C)  Max: 100 8 °F (38 2 °C)        There is no height or weight on file to calculate BMI    N/A    Laboratory and Diagnostics:  Results from last 7 days   Lab Units 01/13/21  0440 01/13/21  0027 01/12/21  1831 01/12/21  1718 01/12/21  1301 01/12/21  1116 01/12/21  0831 01/12/21  0618 01/11/21  1020   WBC Thousand/uL 6 71  --  6 94  --   --   --   --  11 76* 20 20*   HEMOGLOBIN g/dL 9 7* 9 6* 10 3* 10 3* 11 1*  --   --  12 9 15 6   I STAT HEMOGLOBIN g/dl  --   --   --   --   --  9 5* 9 9*  --   --    HEMATOCRIT % 29 2*  --  30 5*  --   --   --   --  37 5 48 3*   HEMATOCRIT, ISTAT %  --   --   --   --   --  28* 29*  --   --    PLATELETS Thousands/uL 44*  --  51*  --   --   --   --  108* 237   NEUTROS PCT %  --   --   --   --   --   --   --  91*  --    BANDS PCT % 23*  --   --   --   --   --   --   --   --    MONOS PCT %  --   --   --   --   --   --   --  3*  --    MONO PCT % 3*  --   --   --   --   --   --   --  7     Results from last 7 days   Lab Units 01/13/21  0840 01/13/21  0440 01/13/21  0027 01/12/21  2042 01/12/21  1718 01/12/21  1301 01/12/21  1116 01/12/21  0835  01/11/21  1422   SODIUM mmol/L 148* 151*  151* 153* 154* 154* 153*  --  160*   < > 150*   POTASSIUM mmol/L 4 3 4 6  4 6 4 7 5 0 5 0 6 3*  --  4 3   < > 3 3*   CHLORIDE mmol/L 121* 122*  120* 124* 123* 124* 125*  --  129*   < > 111*   CO2 mmol/L 20* 21  21 23 22 19* 19*  --  23   < > 10*   CO2, I-STAT mmol/L  --   --   --   --   --   --  20*  --    < >  --    ANION GAP mmol/L 7 8  10 6 9 11 9  --  8   < > 29*   BUN mg/dL 28* 29*  30* 30* 32* 36* 45*  --  45*   < > 64*   CREATININE mg/dL 0 67 0 83  0 75 0 78 0 84 1 03 0 92  --  0 96   < > 1 43*   CALCIUM mg/dL 6 9* 7 0*  7 2* 7 1* 7 1* 7 2* 7 4*  --  7 7*   < > 9 2   GLUCOSE RANDOM mg/dL 174* 172*  172* 222* 199* 260* 252*  --  81   < > 550*   ALT U/L  --  33  --   --   --   --   --   --   --  25   AST U/L  --  77*  --   --   --   --   --   --   --  29   ALK PHOS U/L  --  77  --   --   --   --   --   --   --  114   ALBUMIN g/dL  --  1 4*  --   --   --   --   --   --   --  2 9*   TOTAL BILIRUBIN mg/dL  --  0 50  --   --   --   --   --   --   --  0 70    < > = values in this interval not displayed       Results from last 7 days   Lab Units 01/13/21  0840 01/13/21  0440 01/13/21  0027 01/12/21  2042 01/12/21  1718 01/12/21  1301 01/12/21  0835   MAGNESIUM mg/dL 2 1 2 2 2 3 2 4 2 4 2 4 2 7*   PHOSPHORUS mg/dL 2 1* 2 6 2 0* 2 2* 2 6 4 4* 2 9      Results from last 7 days   Lab Units 01/13/21  0840 01/13/21  0027 01/12/21  1831 01/12/21  0327 01/11/21  1020   INR   --   --  1 70* 1 52* 1 38*   PTT seconds 39* >210* 34 29 26 Results from last 7 days   Lab Units 01/11/21  1020   TROPONIN I ng/mL <0 03     Results from last 7 days   Lab Units 01/13/21  1205 01/12/21  1301 01/12/21  1008 01/12/21  0322 01/12/21  0206 01/11/21  2303 01/11/21  1922   LACTIC ACID mmol/L 1 7 2 4* 2 8* 4 6* 3 3* 4 9* 3 4*     ABG:  Results from last 7 days   Lab Units 01/13/21  0944   PH ART  7 426   PCO2 ART mm Hg 28 9*   PO2 ART mm Hg 67 7*   HCO3 ART mmol/L 18 6*   BASE EXC ART mmol/L -4 8   ABG SOURCE  Line, Arterial     VBG:  Results from last 7 days   Lab Units 01/13/21  0944  01/12/21  1407   PH REAL   --   --  7 322   PCO2 REAL mm Hg  --   --  33 3*   PO2 REAL mm Hg  --   --  70 6*   HCO3 REAL mmol/L  --   --  16 8*   BASE EXC REAL mmol/L  --   --  -8 3   ABG SOURCE  Line, Arterial   < >  --     < > = values in this interval not displayed  Results from last 7 days   Lab Units 01/13/21  0440 01/12/21  0638 01/11/21  1151   PROCALCITONIN ng/ml 1 64* 0 65* 0 31*       Micro:  Results from last 7 days   Lab Units 01/12/21  0640 01/12/21  0206 01/11/21  1937 01/11/21  1926 01/11/21  1020   BLOOD CULTURE   --   --   --  No Growth at 24 hrs  No Growth at 24 hrs  No Growth at 24 hrs  No Growth at 24 hrs  URINE CULTURE  No Growth <1000 cfu/mL  --   --   --   --    MRSA CULTURE ONLY   --   --  No Methicillin Resistant Staphlyococcus aureus (MRSA) isolated  --   --    LEGIONELLA URINARY ANTIGEN   --  Negative  --   --   --    STREP PNEUMONIAE ANTIGEN, URINE   --  Negative  --   --   --        EKG:   Imaging:  CTA C/A/P, CXR, venous duplex I have personally reviewed pertinent reports  Historical Information   Past Medical History:   Diagnosis Date    Diabetes mellitus ([facility] )     GERD (gastroesophageal reflux disease)     Hypercholesteremia     Hypertension     Methadone use ([address] )      No past surgical history on file    Social History   Social History     Substance and Sexual Activity   Alcohol Use No     Social History     Substance and Sexual Activity   Drug Use No    Comment: methadone     Social History     Tobacco Use   Smoking Status Former Smoker   Smokeless Tobacco Never Used     Exercise History:   Family History:   Family History   Problem Relation Age of Onset    Diabetes Mother     Hypertension Father     Leukemia Father     Acute lymphoblastic leukemia Father     Cancer Sister      I have reviewed this patient's family history and commented on sigificant items within the HPI      Medications:  Current Facility-Administered Medications   Medication Dose Route Frequency    ascorbic acid (VITAMIN C) tablet 1,000 mg  1,000 mg Oral Q12H [facility]    atorvastatin (LIPITOR) tablet 40 mg  40 mg Oral HS    [START ON 1/14/2021] cefTRIAXone (ROCEPHIN) 1,000 mg in dextrose 5 % 50 mL IVPB  1,000 mg Intravenous Q24H    chlorhexidine (PERIDEX) 0 12 % oral rinse 15 mL  15 mL Swish & Spit Q12H [facility]    [START ON 1/14/2021] cholecalciferol (VITAMIN D3) tablet 2,000 Units  2,000 Units Oral Daily    dexamethasone (PF) (DECADRON) injection 7 3 mg  0 1 mg/kg Intravenous Q12H    dexmedetomidine (PRECEDEX) 400 mcg in sodium chloride 0 9 % 100 mL infusion  0 1-0 7 mcg/kg/hr Intravenous Titrated    dextrose 5 % infusion  50 mL/hr Intravenous Continuous    doxycycline (VIBRAMYCIN) 100 mg in sodium chloride 0 9 % 100 mL IVPB  100 mg Intravenous Q12H    fentaNYL 1000 mcg in sodium chloride 0 9% 100mL infusion  100 mcg/hr Intravenous Continuous    fentanyl citrate (PF) 100 MCG/2ML 50 mcg  50 mcg Intravenous Q2H PRN    heparin (porcine) 25,000 units in 0 45% NaCl 250 mL infusion (premix)  3-30 Units/kg/hr (Order-Specific) Intravenous Titrated    heparin (porcine) injection 2,800 Units  2,800 Units Intravenous Q1H PRN    heparin (porcine) injection 5,600 Units  5,600 Units Intravenous Q1H PRN    insulin regular (HumuLIN R,NovoLIN R) 1 Units/mL in sodium chloride 0 9 % 100 mL infusion  0 3-21 Units/hr Intravenous Titrated    levalbuterol (XOPENEX) inhalation solution 1 25 mg  1 25 mg Nebulization Q6H    [START ON 2021] zinc sulfate (ZINCATE) capsule 220 mg  220 mg Oral Daily    Followed by   Danelle Forward ON 2021] multivitamin-minerals (CENTRUM ADULTS) tablet 1 tablet  1 tablet Oral Daily    pantoprazole (PROTONIX) 80 mg in sodium chloride 0 9 % 100 mL infusion  8 mg/hr Intravenous Continuous    propofol (DIPRIVAN) 1000 mg in 100 mL infusion (premix)  5-50 mcg/kg/min Intravenous Titrated    sodium chloride (PF) 0 9 % injection 3 mL  3 mL Intravenous Q1H PRN    sodium chloride 3 % inhalation solution 4 mL  4 mL Nebulization Q6H     Home medications:  Prior to Admission Medications   Prescriptions Last Dose Informant Patient Reported? Taking?    Blood Glucose Monitoring Suppl (Tia Woodard) w/Device KIT  Self No No   Sig: Use to test blood sugars 3 times daily   Empagliflozin (Jardiance) 25 MG TABS   No No   Sig: Take 1 tablet (25 mg total) by mouth every morning   Insulin Pen Needle 31G X 5 MM MISC  Self No No   Sig: by Does not apply route daily Pt to inject 4 X daily   Insulin Pen Needle 31G X 5 MM MISC  Self No No   Si units sq 2X daily   ergocalciferol (VITAMIN D2) 50,000 units   No No   Sig: Take 1 capsule (50,000 Units total) by mouth once a week   erythromycin (ILOTYCIN) ophthalmic ointment  Self Yes No   Si 5 inches daily at bedtime   furosemide (LASIX) 40 mg tablet  Self No No   Sig: Take 1 tablet (40 mg total) by mouth daily   Patient taking differently: Take 40 mg by mouth as needed (on off work days)    gabapentin (NEURONTIN) 100 mg capsule   No No   Sig: Take 1 capsule (100 mg total) by mouth 3 (three) times a day   glucose blood (OneTouch Verio) test strip   No No   Sig: Use to test blood sugars 3 times daily   insulin NPH-insulin regular (NovoLIN 70/30) 100 units/mL subcutaneous injection   No No   Sig: Inject 40 Units under the skin 2 (two) times a day before meals   Patient taking differently: Inject 40 Units under the skin 2 (two) times a day before meals Pt states he takes it "once in awhile"   insulin glargine (LANTUS) 100 units/mL subcutaneous injection   No No   Sig: Inject 10 Units under the skin daily at bedtime   Patient taking differently: Inject 20 Units under the skin daily at bedtime    isosorbide mononitrate (IMDUR) 120 mg 24 hr tablet   No No   Sig: Take 1 tablet (120 mg total) by mouth daily   lisinopril-hydrochlorothiazide (PRINZIDE,ZESTORETIC) 20-25 MG per tablet   No No   Sig: Take 1 tablet by mouth daily   methadone (DOLOPHINE) 10 MG/5ML solution  Self Yes No   Sig: Take 70 mg by mouth   omeprazole (PriLOSEC) 20 mg delayed release capsule   No No   Sig: Take 1 capsule (20 mg total) by mouth daily   ondansetron (ZOFRAN-ODT) 4 mg disintegrating tablet   No No   Sig: Take 1 tablet (4 mg total) by mouth every 6 (six) hours as needed for nausea or vomiting   potassium chloride (K-DUR,KLOR-CON) 20 mEq tablet  Self No No   Sig: Take 2 tablets (40 mEq total) by mouth daily   pravastatin (PRAVACHOL) 40 mg tablet   No No   Sig: Take 1 tablet (40 mg total) by mouth daily   promethazine (PHENERGAN) 25 mg tablet   No No   Sig: TAKE 1 TABLET TWICE A DAY   sitaGLIPtin-metFORMIN (JANUMET)  MG per tablet   No No   Sig: Take 1 tablet by mouth 2 (two) times a day with meals      Facility-Administered Medications: None     Allergies: Allergies   Allergen Reactions    Varenicline      ------------------------------------------------------------------------------------------------------------  Advance Directive and Living Will:      Power of :    POLST:    ------------------------------------------------------------------------------------------------------------  Anticipated Length of Stay is > 2 midnights    Care Time Delivered:   No Critical Care time spent       Andrey Moffett MD        Portions of the record may have been created with voice recognition software    Occasional wrong word or "sound a like" substitutions may have occurred due to the inherent limitations of voice recognition software    Read the chart carefully and recognize, using context, where substitutions have occurred

## 2021-01-13 NOTE — COVID-19 HEALTH CARE FACILITY TRANSFER FORM
[facility] to [facility] Transfer - COVID-19 Assessment             Name of Patient: Gavin Russell                : 1960          Transport Date: 21       Has the patient been laboratory tested for COVID-19? []  NO  If No,Test was not indicated per  CDC Testing Criteria  May Transfer Patient   [x] YES  If Tested Results below     COVID-19 References              SARS-CoV-2   Date/Time Value Ref Range Status   2020 07:48 PM Positive (A) Negative Final     SARS-CoV-2    Date/Time Value Ref Range Status   2020 06:08 PM Not Detected Not Detected Final     Comment:     Testing was performed using the lazara(R) SARS-CoV-2 test   This test was developed and its performance characteristics determined  by [employer]  This test has not been FDA cleared or  approved  This test has been authorized by FDA under an Emergency Use  Authorization (EUA)  This test is only authorized for the duration of  time the declaration that circumstances exist justifying the  authorization of the emergency use of in vitro diagnostic tests for  detection of SARS-CoV-2 virus and/or diagnosis of COVID-19 infection  under section 564(b)(1) of the Act, 21 U  S C  269KDK-4(F)(5), unless  the authorization is terminated or revoked sooner              Question is to be completed for any patient who tests positive for COVID-19        1  [] Yes [May Transfer] [x] No [May Not Transfer]          Question is to be completed for any patient who is tested for COVID-19            2    [] Yes [May Not Transfer] [x] No [May Transfer]          Signature of Physician or Health Care Professional: Collette Calamity, CRNP 21          Form updated as of 3/24/2020

## 2021-01-13 NOTE — PROGRESS NOTES
56year old man with Hep C, DM2, HTN, HLP, and chronic methadone use  Found to be COVID-19 (+) 12/31  Presented for recurrent N/V at [facility]  Found to have DKA, transferred to [facility] on 1/11, treated DKA, treated for COVID on severe pathway, had encephalopathy and WOB and intubated on 1/12  Also found to have hematemesis/coffee grounds prior to intubation and placed on PPI, EGD not performed  Also with hematuria post cystoscopy and catheter placed  Cold RLE noted, vascular surgery consulted and started on UFH gtt today and transferred to [facility] for further vascular surgery evaluation  VS AF, HR 70's, MAPS 60's, CVP 0-3, SpO2 100% 0 6/6P  Exam  GEN middle aged man, intubated, sedated, RASS -3 currently  HEENT PERRL, MMM, no thrush, no scleral icterus  NECK no accessory muscle use, RIJ TLC in place w/o purulence or oozing, trachea midline  CV reg, single s1/2, no m/r  Pulm mechanical BS, no wheeze, no rales, scant clear secretions  ABD +BS soft ND, nonrigid  EXT CVS changes b/l, LLE warm, brisk cap refill, palpable femoral, popliteal, DP/PT pulses, trace edema, RLE cold at knee and below, palpable femoral pulse, delayed cap refill, nonpalpable or dopplerable popliteal, DP or PT pulses, unable to obtain movement of RLE with sedation off and no clear sensation below the knee, intact sensation above the knee and in LLE     rogers in place, yellow nonbloody urine    Laboratory and Diagnostics  Results from last 7 days   Lab Units 01/13/21  0440 01/13/21  0027 01/12/21  1831 01/12/21  1718 01/12/21  1301 01/12/21  1116 01/12/21  0831 01/12/21  0618 01/11/21  1020   WBC Thousand/uL 6 71  --  6 94  --   --   --   --  11 76* 20 20*   HEMOGLOBIN g/dL 9 7* 9 6* 10 3* 10 3* 11 1*  --   --  12 9 15 6   I STAT HEMOGLOBIN g/dl  --   --   --   --   --  9 5* 9 9*  --   --    HEMATOCRIT % 29 2*  --  30 5*  --   --   --   --  37 5 48 3*   HEMATOCRIT, ISTAT %  --   --   --   --   --  28* 29*  --   --    PLATELETS Thousands/uL 44* --  51*  --   --   --   --  108* 237   NEUTROS PCT %  --   --   --   --   --   --   --  91*  --    BANDS PCT % 23*  --   --   --   --   --   --   --   --    MONOS PCT %  --   --   --   --   --   --   --  3*  --    MONO PCT % 3*  --   --   --   --   --   --   --  7     Results from last 7 days   Lab Units 01/13/21  0840 01/13/21  0440 01/13/21  0027 01/12/21  2042 01/12/21  1718 01/12/21  1301 01/12/21  1116 01/12/21  0835  01/11/21  1422   SODIUM mmol/L 148* 151*  151* 153* 154* 154* 153*  --  160*   < > 150*   POTASSIUM mmol/L 4 3 4 6  4 6 4 7 5 0 5 0 6 3*  --  4 3   < > 3 3*   CHLORIDE mmol/L 121* 122*  120* 124* 123* 124* 125*  --  129*   < > 111*   CO2 mmol/L 20* 21  21 23 22 19* 19*  --  23   < > 10*   CO2, I-STAT mmol/L  --   --   --   --   --   --  20*  --    < >  --    ANION GAP mmol/L 7 8  10 6 9 11 9  --  8   < > 29*   BUN mg/dL 28* 29*  30* 30* 32* 36* 45*  --  45*   < > 64*   CREATININE mg/dL 0 67 0 83  0 75 0 78 0 84 1 03 0 92  --  0 96   < > 1 43*   CALCIUM mg/dL 6 9* 7 0*  7 2* 7 1* 7 1* 7 2* 7 4*  --  7 7*   < > 9 2   GLUCOSE RANDOM mg/dL 174* 172*  172* 222* 199* 260* 252*  --  81   < > 550*   ALT U/L  --  33  --   --   --   --   --   --   --  25   AST U/L  --  77*  --   --   --   --   --   --   --  29   ALK PHOS U/L  --  77  --   --   --   --   --   --   --  114   ALBUMIN g/dL  --  1 4*  --   --   --   --   --   --   --  2 9*   TOTAL BILIRUBIN mg/dL  --  0 50  --   --   --   --   --   --   --  0 70    < > = values in this interval not displayed       Results from last 7 days   Lab Units 01/13/21  0840 01/13/21  0440 01/13/21  0027 01/12/21  2042 01/12/21  1718 01/12/21  1301 01/12/21  0835   MAGNESIUM mg/dL 2 1 2 2 2 3 2 4 2 4 2 4 2 7*   PHOSPHORUS mg/dL 2 1* 2 6 2 0* 2 2* 2 6 4 4* 2 9      Results from last 7 days   Lab Units 01/13/21  0840 01/13/21  0027 01/12/21  1831 01/12/21  0327 01/11/21  1020   INR   --   --  1 70* 1 52* 1 38*   PTT seconds 39* >210* 34 29 26      Results from last 7 days   Lab Units 01/11/21  1020   TROPONIN I ng/mL <0 03     Results from last 7 days   Lab Units 01/13/21  1205 01/12/21  1301 01/12/21  1008 01/12/21  0322 01/12/21  0206 01/11/21  2303 01/11/21  1922   LACTIC ACID mmol/L 1 7 2 4* 2 8* 4 6* 3 3* 4 9* 3 4*     Results from last 7 days   Lab Units 01/13/21  0440 01/12/21  0638 01/11/21  1922   FERRITIN ng/mL 1,608* 1,716* 1,854*   CRP mg/L 279 4* 108 1* 80 0*             Results from last 7 days   Lab Units 01/13/21  0440 01/11/21  1922   D-DIMER QUANTITATIVE ug/ml FEU 18 65* 8 30*     Results from last 7 days   Lab Units 01/13/21  0440 01/12/21  0638 01/11/21  1151   PROCALCITONIN ng/ml 1 64* 0 65* 0 31*       ABG:   Results from last 7 days   Lab Units 01/13/21  0944   PH ART  7 426   PCO2 ART mm Hg 28 9*   PO2 ART mm Hg 67 7*   HCO3 ART mmol/L 18 6*   BASE EXC ART mmol/L -4 8   ABG SOURCE  Line, Arterial       MICRO  Urine Cx 1/12 pending  Strep/legionella ag neg  Bld CX 1/11 NGTD  Bld Cx 12/31 NGTD  COVID-19 (+) 12/31  (+) Hep B Core, (+) Hep C Ab  Neg HIV    RADIOGRAPHS - images personally reviewed  CXR 1/13 - T/L good position, diffuse bilateral alveolar infiltrates, no PTX    LE US 1/12 (+) RLE DVT    CT angio C/A/P - LLL PE, diffuse bilateral GGOs, duodenal thickening and likely duodenitis, mural thrombus distal aorta noted    CT head no acute mass, bleed or shift    TTE EF 70%,       Assessment  1  Acute COVID-19 Infection - POA   Patient is on COVID-19 Treatment Algorithm - Severe Severity Pathway   Continue Zinc, Vit D, Vit C, and statin   Decadron Day 2/10 - 0 1mg/kg   Remdesivir not given per protocol   Actemra not given   Convalescent Plasma not given    2  Acute hypoxic respiratory failure secondary to #1  3  Abnormal CXR secondary to Viral Pneumonia secondary to #1  4  Sepsis - POA secondary to #1  5  Acute Right lower limb ischemia suspected embolic source from aortic mural thrombus  6  RLE DVT  7   LLL Acute Pulmonary Embolism  8  Thrombocytopenia/anemia  9  DM2 with DKA on presentation - DKA resolved  10  Gross hematuria - suspected Hemorrhagic cystitis  11  Suspected Upper GI Bleed  12  Hepatitis C, abnormal LFTs  13  Elevated inflammatory markers c/w cytokine storm  14  Toxic/metabolic encephalopathy  15  Hypotension suspected Sepsis w/ volume depletion  16  Hypernatremia   17  ANNALEE - resolved    PLAN  · NEURO - sedation with propofol/precedex and fentanyl gtts, goal RASS -2 to 0, attempt repeat neuro exam in AM, serial CAMICU  · CARDIAC - hold home BP regimen, goal MAP >65, continue volume resuscitation, trend CVP, continue isolyte/albumin bolus  · PULM - currently on AC/VC Rate 16, , 0 6/6P, PltP 28 - Ideal body weight 68kg, goal Vt 6cc/kg = 410cc - will reduce Vt now and increase RR to 20 and repeat ABG 1hr  · GI - no evidence of active bleeding, continue PPI gtt, NPO except meds for now  · RENAL - improved renal parameters, trend BMP, continue volume resuscitation, resolved hematuria, noted rapid correction of hypernatremia - will hold further D5W for now, serial BMP q6hrs  · HEME - thrombocytopenia predated use of UFH, noted prior thrombocytopenia, suspected elevation on presentation related hemoconcentration  Will continue UFH gtt, serial CBC q6hrs, check peripheral smear now, trend DDimer  - await repeat vascular surgery evaluation today upon transfer for possible surgical management of suspected embolic lesion and ischemic limb    Check total CK, serial vascular exams - Addendum - discussed extensively with Dr Casey Hernandez Doctor - vascular surgery with plans for operative evaluation/intervention at attempt for limb salvage however likely poor prognosis  · ID - Continue COVID-19 protocol, remain on CTX, stop doxy, trend temp/WBC, will d/w family possible convalescent plasma dosing after operative evaluation, holding on Actemra at this time, trend inflammatory markers  · ENDO - insulin gtt, DKA state resolved, serial BMP panels  · ICU Care - SCD to LLE, UFH gtt, PPI gtt, CHG/HOB >30deg    I have personally seen and examined the patient on 1/13/2021(date) at 1530 (time)  I discussed the patient with the Resident including, but not limited to, verifying findings; reviewing labs and x-rays; discussing with consultants; developing the plan of care with the bedside nurse; and discussing treatment plan with patient or surrogate  I have reviewed the note and assessment performed by the Resident, and agree with the documented findings and plan of care with the following additions/exceptions  Please see my following comments for details and adjustments  Critical care time, excluding procedures, teaching, family meetings, and excludes any prior time recorded by providers other than myself, (50) minutes ( to [phone])          Paty Garcia, DO

## 2021-01-13 NOTE — QUICK NOTE
Additional Problem from [facility] during admission at [facility]:    Possible Severe Sepsis POA in the setting of Covid Pna with Acute Respiratory Failure, ANNALEE a/e/b HR 97 and RR 40 treated with IV Rocephin, IV Decadron, IV Vibramycin, Intubation, ICU monitoring, CTA Chest, Chest xray, blood cultures, procalcitonin, lab monitoring and VS monitoring- Currently resolved on antibiotics and has been transferred to [facility]  Currently no leukocytosis, afebrile  Patient continued to be treated as COVID Pneumonia    Findings:   Sepsis Criteria "may be clinically indicated by any 2 of the 4 following indicators in the presence of an infectious process: 1  Temperature >38*C or <36C   2  Heart Rate >90/min   3  Respiratory Rate >20/min or PaCO2 <32 mm Hg   4  WBC >12,000/mm3 or <4000/mm3 or >10% immature bands"

## 2021-01-13 NOTE — PROGRESS NOTES
Daily Progress Note - Critical Care   Vernon Louis 56 y o  male MRN: [id number]  Unit/Bed#: ICU 12 Encounter: [id number]        ----------------------------------------------------------------------------------------   HPI/24hr events: Patient is a 56 yr old man pmhx significant for DMT2, HTN, HL, Hep C, GERD presented to [address] [place] [address] on 1/11/21 with AMS, nausea, and vomiting  No events overnight    ---------------------------------------------------------------------------------------   SUBJECTIVE  Patient continues to be nonresponsive  No events overnight    Review of Systems  Review of systems was unable to be performed secondary to Nonresponsive  ---------------------------------------------------------------------------------------  Assessment and Plan:    Neuro:    Diagnosis: Acute Toxic Metabolic Encephalopathy due to COVID/Propofol sedation/ hx of substance use  o Plan:  - Methadone dosing from [facility] confirmed to be methadone 70mg daily  - Currently on fentanyl 100mcg/hr to prevent withdrawal  - On Fentanyl citrate 50mcg PRN for moderate pain/agitation      CV:    Diagnosis: Right sided DVT contributed by COVID  o Plan:   - Currently anticoagulated with Heparin Drip  - Also Heparin 2800 units q1hr prn to maintain PTT 43-59 s  - Heparin 5600 units q1hr prn for PTT less than or equal to 42 s  - Vascular Surgery consult for possible IVC filter   Diagnosis: Pulmonary Embolus  o Plan:   - Continue Heparin  - D-dimer 18 65 from 8 30   Diagnosis: Cardiogenic Shock  o Plan:  - Pressors/bolus      Pulm:   Diagnosis: Acute hypoxic respiratory failure complicated with emphysema/COVID Pneumonia/hx of tobacco use   AC/VC  RR SpO2  o Plan:  - Continued significant tachypnea despite multiple ventilator modes  - Recheck ABG      GI:    Diagnosis Hematemesis due to Upper GI bleed;  Concern for peptic ulcer disease vs Leigh Marmolejo  o Plan:   - Anticoagulation for COVID 19 protocol- Heparin currently; monitor for recurrent bleeding  - Protonix drip   Diagnosis: NPO      :    Diagnosis: Gross Hematuria most likely due to trauma from [place]  o Plan:  - Clear urine in rogers  - GI is following       Diagnosis: Hypernatremia  o Plan:   - 3 5L free water deficit  - 151 Sodium on last BMP  - Continue to dose BMP      F/E/N:    Plan:   o Fluids:  - Precedex 50mcg/hr  - Fentanyl 100mcg/hr  - Heparin 2100 units/hr  - Insulin 1U/mL  - Protonix 8mg/hr  - Propofol       Heme/Onc:    Diagnosis: Acute Blood Loss Anemia due to Hematemesis/Hematuria  o Plan:   - HGB drop to 10 3 yesterday curently stable at 9 7  - HGB trend q8hr  - Obtain consent  - Transfuse if HGB<7 5      Endo:    Diagnosis: T2DM  o Plan:  - Blood glucose well controlled on insulin drip  - Gap has closed      ID:    Diagnosis: COVID pneumonia  o Plan: Procal 0 31- continue to trend  - Blood and urine cultures  - Trend lactic acid  - Continue ceftriaxone/ doxycycline        Disposition: Continue Critical Care   Code Status: Level 1 - Full Code  ---------------------------------------------------------------------------------------  ICU CORE MEASURES    Prophylaxis   VTE Pharmacologic Prophylaxis: Heparin Drip  VTE Mechanical Prophylaxis: sequential compression device  Stress Ulcer Prophylaxis: Pantoprazole IV     ABCDE Protocol (if indicated)  Plan to perform spontaneous awakening trial today? No  Plan to perform spontaneous breathing trial today? No  Obvious barriers to extubation?  Yes  CAM-ICU: Positive    Invasive Devices Review  Invasive Devices     Peripheral Intravenous Line            Peripheral IV 01/11/21 Left Antecubital 1 day    Peripheral IV 01/11/21 Left Wrist 1 day    Peripheral IV 01/12/21 Left;Upper Arm less than 1 day    Peripheral IV 01/12/21 Right Forearm less than 1 day          Arterial Line            Arterial Line 01/12/21 Radial less than 1 day          Drain            Urethral Catheter 18 Fr  -- days    NG/OG/Enteral Tube Orogastric 16 Fr less than 1 day          Airway            ETT  Oral;Hi-Lo; Cuffed 7 5 mm less than 1 day              Can any invasive devices be discontinued today? No  ---------------------------------------------------------------------------------------  OBJECTIVE    Vitals   Vitals:    21 1815 21 1830 21 1845 21 1900   BP: 101/55 101/55 102/60 94/55   BP Location:       Pulse: 88 86 82 78   Resp: (!) 48 (!) 49 (!) 32 (!) 30   Temp:    100 °F (37 8 °C)   TempSrc:    Axillary   SpO2: 93% 93% 92% 93%   Weight:       Height:         Temp (24hrs), Av 8 °F (37 1 °C), Min:97 1 °F (36 2 °C), Max:100 8 °F (38 2 °C)  Current: Temperature: 100 °F (37 8 °C)    Respiratory:  SpO2: SpO2: 97 %       Invasive/non-invasive ventilation settings   Respiratory    Lab Data (Last 4 hours)       1718            pH, Arterial       7 383           pCO2, Arterial       30 9           pO2, Arterial       112 2           HCO3, Arterial       18 0           Base Excess, Arterial       -6 1                O2/Vent Data        1704   Most Recent        Resp Rate (BPM) (BPM) 15  15      VT (mL) (mL) 500  500      FiO2 (%) (%) 60  60      PEEP (cmH2O) (cmH2O) 6  6                  Physical Exam  Vitals signs and nursing note reviewed  Constitutional:       Appearance: He is well-developed  HENT:      Head: Normocephalic and atraumatic  Eyes:      Conjunctiva/sclera: Conjunctivae normal    Neck:      Musculoskeletal: Neck supple  Cardiovascular:      Rate and Rhythm: Normal rate and regular rhythm  Heart sounds: No murmur  Comments: R>L extremity cold but warmer than yesterday  Pulmonary:      Effort: Pulmonary effort is normal  No respiratory distress  Breath sounds: Normal breath sounds  Abdominal:      Palpations: Abdomen is soft  Tenderness: There is no abdominal tenderness  Skin:     General: Skin is warm and dry  Neurological:      Mental Status: He is alert           Laboratory and Diagnostics:  Results from last 7 days   Lab Units 01/12/21  1831 01/12/21  1718 01/12/21  1301 01/12/21  1116 01/12/21  0831 01/12/21  0618 01/11/21  1020 01/06/21  0527   WBC Thousand/uL 6 94  --   --   --   --  11 76* 20 20* 3 00*   HEMOGLOBIN g/dL 10 3* 10 3* 11 1*  --   --  12 9 15 6 14 5   I STAT HEMOGLOBIN g/dl  --   --   --  9 5* 9 9*  --   --   --    HEMATOCRIT % 30 5*  --   --   --   --  37 5 48 3* 42 7   HEMATOCRIT, ISTAT %  --   --   --  28* 29*  --   --   --    PLATELETS Thousands/uL 51*  --   --   --   --  108* 237 79*   NEUTROS PCT %  --   --   --   --   --  91*  --   --    MONOS PCT %  --   --   --   --   --  3*  --   --    MONO PCT %  --   --   --   --   --   --  7 4     Results from last 7 days   Lab Units 01/12/21  1718 01/12/21  1301 01/12/21  1116 01/12/21  0835 01/12/21  0831 01/12/21  5294 01/12/21  0322 01/11/21  1922 01/11/21  1422  01/06/21  0527   SODIUM mmol/L 154* 153*  --  160*  --  161* 160* 154* 150*   < > 132*   POTASSIUM mmol/L 5 0 6 3*  --  4 3  --  3 8 3 5 3 6 3 3*   < > 5 1   CHLORIDE mmol/L 124* 125*  --  129*  --  129* 126* 118* 111*   < > 94*   CO2 mmol/L 19* 19*  --  23  --  24 23 19* 10*   < > 22   CO2, I-STAT mmol/L  --   --  20*  --  21  --   --   --   --   --   --    ANION GAP mmol/L 11 9  --  8  --  8 11 17* 29*   < > 16*   BUN mg/dL 36* 45*  --  45*  --  45* 47* 59* 64*   < > 23   CREATININE mg/dL 1 03 0 92  --  0 96  --  1 11 1 17 1 51* 1 43*   < > 0 96   CALCIUM mg/dL 7 2* 7 4*  --  7 7*  --  7 9* 8 1* 8 7 9 2   < > 8 9   GLUCOSE RANDOM mg/dL 260* 252*  --  81  --  85 140 261* 550*   < > 279*   ALT U/L  --   --   --   --   --   --   --   --  25  --  27   AST U/L  --   --   --   --   --   --   --   --  29  --  58*   ALK PHOS U/L  --   --   --   --   --   --   --   --  114  --  71   ALBUMIN g/dL  --   --   --   --   --   --   --   --  2 9*  --  3 5   TOTAL BILIRUBIN mg/dL  --   --   --   --   --   --   --   --  0 70  --  0 80    < > = values in this interval not displayed  Results from last 7 days   Lab Units 01/12/21  1718 01/12/21  1301 01/12/21  0835 01/12/21  0638 01/12/21  0322 01/11/21  1922 01/11/21  1422   MAGNESIUM mg/dL 2 4 2 4 2 7* 1 9 2 0 2 2 2 5   PHOSPHORUS mg/dL 2 6 4 4* 2 9 3 5 1 2* 0 8* 2 7*      Results from last 7 days   Lab Units 01/12/21  1831 01/12/21  0327 01/11/21  1020   INR  1 70* 1 52* 1 38*   PTT seconds 34 29 26      Results from last 7 days   Lab Units 01/11/21  1020 01/06/21  0557   TROPONIN I ng/mL <0 03 <0 03     Results from last 7 days   Lab Units 01/12/21  1301 01/12/21  1008 01/12/21  0322 01/12/21  0206 01/11/21  2303 01/11/21  1922 01/11/21  1224   LACTIC ACID mmol/L 2 4* 2 8* 4 6* 3 3* 4 9* 3 4* 2 5*     ABG:  Results from last 7 days   Lab Units 01/12/21  1718   PH ART  7 383   PCO2 ART mm Hg 30 9*   PO2 ART mm Hg 112 2   HCO3 ART mmol/L 18 0*   BASE EXC ART mmol/L -6 1   ABG SOURCE  Line, Arterial     VBG:  Results from last 7 days   Lab Units 01/12/21  1718 01/12/21  1407   PH REAL   --  7 322   PCO2 REAL mm Hg  --  33 3*   PO2 REAL mm Hg  --  70 6*   HCO3 REAL mmol/L  --  16 8*   BASE EXC REAL mmol/L  --  -8 3   ABG SOURCE  Line, Arterial  --      Results from last 7 days   Lab Units 01/12/21  0638 01/11/21  1151   PROCALCITONIN ng/ml 0 65* 0 31*       Micro  Results from last 7 days   Lab Units 01/12/21  0206 01/11/21  1926 01/11/21  1020   BLOOD CULTURE   --  Received in Microbiology Lab  Culture in Progress  Received in Microbiology Lab  Culture in Progress  No Growth at 24 hrs  No Growth at 24 hrs  LEGIONELLA URINARY ANTIGEN  Negative  --   --    STREP PNEUMONIAE ANTIGEN, URINE  Negative  --   --        EKG: Irregular irregular rhythm  Imaging: I have personally reviewed pertinent reports        Intake and Output  I/O       01/11 0701 - 01/12 0700 01/12 0701 - 01/13 0700    I V  (mL/kg) 1697 (23 2) 2909 8 (39 8)    NG/GT  60    IV Piggyback 950 800    Total Intake(mL/kg) 2647 (36 2) 3769 8 (51 5)    Urine (mL/kg/hr) 200 1025 (1 1)    Emesis/NG output 400 0    Total Output 600 1025    Net +2047 +2744 8                Height and Weights   Height: 5' 8" (172 7 cm)  IBW: 68 4 kg  Body mass index is 24 54 kg/m²  Weight (last 2 days)     Date/Time   Weight    01/11/21 1847   73 2 (161 38)                Nutrition       Diet Orders   (From admission, onward)             Start     Ordered    01/11/21 20201 [facility]  Room Service  Once     Question:  Type of Service  Answer:  Room Service- Not Appropriate    01/11/21 1858 01/11/21 1846  Diet NPO  Diet effective now     Question Answer Comment   Diet Type NPO    RD to adjust diet per protocol?  Yes        01/11/21 1845                  Active Medications  Scheduled Meds:  Current Facility-Administered Medications   Medication Dose Route Frequency Provider Last Rate    ascorbic acid  1,000 mg Oral Q12H Oli Limon PA-C      atorvastatin  40 mg Oral HS Fanny Mittal PA-C      cefTRIAXone  1,000 mg Intravenous Q24H Fanny Mittal PA-C Stopped (01/12/21 1600)    chlorhexidine  15 mL [place], [place]      cholecalciferol  2,000 Units Oral Daily [place], [place]      dexamethasone  0 1 mg/kg Intravenous Q12H Slick Sanchez MD      dexmedetomidine  0 1-0 7 mcg/kg/hr Intravenous Titrated Fanny Mittal PA-C 0 7 mcg/kg/hr (01/12/21 2019)    dextrose  125 mL/hr Intravenous Continuous Slick Sanchez  mL/hr (01/12/21 1030)    doxycycline  100 mg Intravenous Q12H Fanny Mittal PA-C Stopped (01/12/21 1600)    fentaNYL  100 mcg/hr Intravenous Continuous Marsha Walbert, CRNP 100 mcg/hr (01/12/21 1400)    fentanyl citrate (PF)  50 mcg Intravenous Q2H PRN Fanny Mittal PA-C      heparin (porcine)  3-30 Units/kg/hr (Order-Specific) Intravenous Titrated Elizabeth Bake, CRNP 18 Units/kg/hr (01/12/21 1833)    heparin (porcine)  2,800 Units Intravenous Q1H PRN Elizabeth Bake, CRNP      heparin (porcine)  5,600 Units Intravenous Q1H PRN VERONICA Adhikari      insulin regular (HumuLIN R,NovoLIN R) infusion  0 3-21 Units/hr Intravenous Titrated Vivian Giron MD 4 Units/hr (01/12/21 2004)    levalbuterol  1 25 mg Nebulization Q6H VERONICA Carlisle      zinc sulfate  220 mg Oral Daily Devoria High, PA-C      Followed by   Aniket Jacob ON 1/19/2021] multivitamin-minerals  1 tablet Oral Daily Devoria High, PA-C      pantoprozole (PROTONIX) infusion (Continuous)  8 mg/hr Intravenous Continuous Devoria High, PA-C 8 mg/hr (01/12/21 1712)    propofol  5-50 mcg/kg/min Intravenous Titrated VERONICA Carlisle 25 mcg/kg/min (01/12/21 1944)    sodium chloride (PF)  3 mL Intravenous Q1H PRN Devoria High, PA-C      sodium chloride  4 mL Nebulization Q6H VERONICA Carlisle       Continuous Infusions:  dexmedetomidine, 0 1-0 7 mcg/kg/hr, Last Rate: 0 7 mcg/kg/hr (01/12/21 2019)  dextrose, 125 mL/hr, Last Rate: 125 mL/hr (01/12/21 1030)  fentaNYL, 100 mcg/hr, Last Rate: 100 mcg/hr (01/12/21 1400)  heparin (porcine), 3-30 Units/kg/hr (Order-Specific), Last Rate: 18 Units/kg/hr (01/12/21 1833)  insulin regular (HumuLIN R,NovoLIN R) infusion, 0 3-21 Units/hr, Last Rate: 4 Units/hr (01/12/21 2004)  pantoprozole (PROTONIX) infusion (Continuous), 8 mg/hr, Last Rate: 8 mg/hr (01/12/21 1712)  propofol, 5-50 mcg/kg/min, Last Rate: 25 mcg/kg/min (01/12/21 1944)      PRN Meds:   fentanyl citrate (PF), 50 mcg, Q2H PRN  heparin (porcine), 2,800 Units, Q1H PRN  heparin (porcine), 5,600 Units, Q1H PRN  sodium chloride (PF), 3 mL, Q1H PRN        Allergies   Allergies   Allergen Reactions    Varenicline      ---------------------------------------------------------------------------------------  Advance Directive and Living Will:      Power of Attorney:    POLST:    ---------------------------------------------------------------------------------------  Care Time Delivered:   Upon my evaluation, this patient had a high probability of imminent or life-threatening deterioration due to DVT/PE/ GI bleed, which required my direct attention, intervention, and personal management  I have personally provided 20 minutes (8 to 8:20) of critical care time, exclusive of procedures, teaching, family meetings, and any prior time recorded by providers other than myself  Luis Scott MD      Portions of the record may have been created with voice recognition software  Occasional wrong word or "sound a like" substitutions may have occurred due to the inherent limitations of voice recognition software    Read the chart carefully and recognize, using context, where substitutions have occurred

## 2021-01-13 NOTE — PLAN OF CARE
Problem: Prexisting or High Potential for Compromised Skin Integrity  Goal: Skin integrity is maintained or improved  Description: INTERVENTIONS:  - Identify patients at risk for skin breakdown  - Assess and monitor skin integrity  - Assess and monitor nutrition and hydration status  - Monitor labs   - Assess for incontinence   - Turn and reposition patient  - Assist with mobility/ambulation  - Relieve pressure over bony prominences  - Avoid friction and shearing  - Provide appropriate hygiene as needed including keeping skin clean and dry  - Evaluate need for skin moisturizer/barrier cream  - Collaborate with interdisciplinary team   - Patient/family teaching  - Consider wound care consult   Outcome: Not Progressing

## 2021-01-13 NOTE — DISCHARGE SUMMARY
Discharge Summary - [facility] Critical Care    Patient Information: Whitney Roman 56 y o  male MRN: [id number]  Unit/Bed#: ICU 12 Encounter: [id number]    Discharging Physician / Practitioner: VERONICA Russell  PCP: Danita Cunningham    Admission Date: 1/11/2021  Discharge Date: 01/13/21    Reason for Admission: Covid pneumonia    Discharge Diagnoses:   Principal Problem:    Pneumonia due to COVID-19 virus  Active Problems:    Acute respiratory failure with hypoxia ([facility] )    Sepsis due to COVID-19 [facility])    Diabetic ketoacidosis without coma associated with type 2 diabetes mellitus ([facility] )    Aortic thrombus ([place] [address] )    Acute metabolic encephalopathy    Metabolic acidosis, increased anion gap    Acute kidney injury ([facility] )    Acute pancreatitis    Hematuria    Hypernatremia    Chronic hepatitis C without hepatic coma ([facility] )    Methadone maintenance therapy patient ([facility] )    Essential hypertension    Mixed hyperlipidemia  Resolved Problems:    * No resolved hospital problems  *      Consultations During Hospital Stay:  · Vascular  · GI  · Uronolgy    Procedures Performed:     · Cyto assisted rogers insertion  · Intubation     Significant Findings:     · Descending Aortic thrombus  · Covid- 19    Incidental Findings:   · None     Test Results Pending at Discharge (will require follow up): · Blood cultures     Outpatient Tests Requested:  · None    Complications: Aortic thrombus    Hospital Course: per H&P    Whitney Roman is a 56 y o  male patient who originally presented to [address] [place] [address] upon [employer] transfer from [facility] on 1/11/2021  Upon chart review as well as discussion with karen Lopez, he reports Jose Enrique Brunner was originally diagnosed with COVID-19 on 12/31  Patient was not found to be hypoxic at that time and was discharged home  Patient since his diagnosis has had multiple ER visits: 1/3, 1/4, 1/6 secondary to profound Nausea and vomiting    Patient presented today with reported changed in mental status, shortness of breath, as well as continued Nausea and vomiting      Patient has an extensive PMH of DM, HTN, HLD, Hep C, and GERD  In further discussion with Nephew he reports patient resides with multiple other family members including Carmelo Rendon, entire family in house are diagnosed with COVID  Family reports over the past several days, patient has been unable to eat or drink, as well as had been unable to take any of his medications, including DM meds  1/10 they noted patient complaining of sore throat and appear to be more "out of it" although he would shake his head Yes or No to questions  Family denies any complaints of headache, they also state no noted facial droop, no noted asymmetric weakness, and there was no slurred speech when the patient was talking      [facility]ed to [facility]  Workup included Labs revealing CO2 10, Gap 28, BUN 65, Cr 1 47 and glucose of 654  Initial Lactic acid of 2 8, WBC 20 2, Hgb 15 6,   Beta Hydroxy 6 2, INR 1 38, VBG 7 11 / 22  He was administered 1amp of Sodium Bicarb in the ER as well as Zosyn and was subsequently placed on Cefepime and Vancomycin on admission at [facility]  He received 125mg Solumedrol, and received 1 5L total of NSS, placed on Insulin gtt DKA protocol and being Transferred [facility] ICU     1/12 Intubated, CT chest showed left lower lobe PE, with generalized GGO  Right femoral DVT discovered on duplex of lower extremities  Right foot was noted to be cool to touch  1/13 right foot noted to remain cool and concern for decreased flow so vascular consult placed  Upon consult they noted the patient had a filling defect in his descending aorta on prior CT scan consistent with thrombus   Recommend urgent transfer to [facility] for possible intervention/ thrmobectomy    Condition at Discharge: stable     Discharge Day Visit / Exam:     * Please refer to separate progress for these details *    Discharge instructions/Information to patient and family:   See after visit summary for information provided to patient and family  Provisions for Follow-Up Care:  See after visit summary for information related to follow-up care and any pertinent home health orders  Disposition: transfer to [facility]    Planned Readmission: none    Discharge Statement:  I spent 30 minutes discharging the patient  This time was spent on the day of discharge  I had direct contact with the patient on the day of discharge  Greater than 50% of the total time was spent examining patient, answering all patient questions, arranging and discussing plan of care with patient as well as directly providing post-discharge instructions  Additional time then spent on discharge activities  Discharge Medications:  See after visit summary for reconciled discharge medications provided to patient and family      Discharge Diet: NPO  Activity restrictions: none    VERONICA Vergara  1/13/2021  12:44 PM

## 2021-01-13 NOTE — ANESTHESIA PREPROCEDURE EVALUATION
Procedure:  EMBOLECTOMY/THROMBECTOMY LOWER EXTREMITY (Bilateral Leg Lower)  FASCIOTOMY (Bilateral Leg Lower)  ARTERIOGRAM (Bilateral Leg Lower)    Relevant Problems   CARDIO   (+) Aortic thrombus (HCC)   (+) Essential hypertension   (+) Mixed hyperlipidemia      ENDO   (+) Type 2 diabetes mellitus with diabetic polyneuropathy (HCC)      GI/HEPATIC   (+) Acute pancreatitis   (+) Chronic hepatitis C without hepatic coma (HCC)      /RENAL   (+) Acute kidney injury (HCC)      NEURO/PSYCH   (+) Type 2 diabetes mellitus with diabetic polyneuropathy (HCC)      PULMONARY   (+) Acute respiratory failure with hypoxia (HCC)   (+) Pneumonia due to COVID-19 virus      Other   (+) Acute metabolic encephalopathy   (+) BMI 28 0-28 9,adult   (+) Bilateral lower extremity edema   (+) Diabetic ketoacidosis without coma associated with type 2 diabetes mellitus (HCC)   (+) Hyperlipidemia associated with type 2 diabetes mellitus (HCC)   (+) Hypernatremia   (+) Metabolic acidosis, increased anion gap   (+) Methadone maintenance therapy patient ([facility] )   (+) Sepsis due to COVID-19 [facility])        Physical Exam    Airway  Comment: Intubated           Dental       Cardiovascular  Rhythm: regular, Rate: normal,     Pulmonary      Other Findings        Anesthesia Plan  ASA Score- 5 Emergent    Anesthesia Type- general with ASA Monitors  Additional Monitors: arterial line and central venous line  Airway Plan: ETT  Plan Factors-Exercise tolerance (METS): <4 METS  Chart reviewed  Existing labs reviewed  Patient summary reviewed  Patient did not smoke on day of surgery  Induction- inhalational and intravenous  Postoperative Plan-     Informed Consent- Plan/risks discussed with: [facility] ) - [phone]   I personally reviewed this patient with the CRNA  Discussed and agreed on the Anesthesia Plan with the CRNA  Elizabeth Riggs

## 2021-01-13 NOTE — APP STUDENT NOTE
H&P Exam - Angeline Monse Carmen Moss [address] 56 y o  male MRN: [id number]  Unit/Bed#: MICU 11 Encounter: [id number]      -------------------------------------------------------------------------------------------------------------  Chief Complaint: Limb Ischemia/Hypoxic Respiratory Failure (per chart)    History of Present Illness   HX and PE limited by: Ma Cabot is a 56 y o  male who presents with Acute hypoxic respiratory failure 2/2 COVID, and concern for acute R leg ischemia 2/2 to mural abdominal aortic thrombus  PMHx includes DM, HTN, methadone use, HLD, Hep C, GERD  Pt presented initially to [facility] [address] on 1/11 with AMS, N/V, and was found to be in DKA and had COVID pneumonia w/ acute hypoxic repiratory failure and was transferred to [facility] on 1/11  Initial + COVID test was on 12/31  Pt had recent ED visits for N/V but was not admitted due to stable O2 sats  On admission at [facility] pt was acidotic with a pH of 7 1 and presented with bloody emesis and hematuria  Patient was promptly intubated on 1/12 for increasing WOB and progressive hypoxia  Urology was consulted and a bedside cystoscopy was performed to clean out clot formed in the bladder  GI was consulted for a possible upper GI bleed, pt was started on PPI and anticoagulation was restarted per GI recommendation  Patient also at this time had a UA that was concerning for UTI  Patient's DKA was stabilized at [facility], with pH resolution and gap closure  Patient was seen by vascular surgery on 1/12 for a cool distal RLE  PE at the LL lobe and non-occlusive R femoral DVT were identified on CTA-Ch/A/P and LE Doppler  Pt was transferred to [facility] for vascular surgery evaluation  History obtained from chart review and unobtainable from patient due to INTUBATION    -------------------------------------------------------------------------------------------------------------  Assessment and Plan:    Neuro:    Diagnosis: Sedation o Plan: Continue precedex, propofol, and fentanyl for sedation  o Sedate to RASS of -2 to 0    CV:    Diagnosis: Abdominal Aortic thrombus causing limb ischemia  o Plan: Continue UFH drip   o STAT vascular surgery evaluation  o Perform vascular exams and monitor the limb for worsening signs of ischemia   Diagnosis: HTN  o Plan: hold home BP meds and maintain MAP above 65    Pulm:   Diagnosis: Acute hypoxemic respiratory failure 2/2 COVID  o Plan: Current settings RR:20, Tv: 560, PEEP: 6, Plateau: 28  o Will adjust Tv to goal of 6cc/kgIBW (410cc)   Diagnosis: PE  o Plan: continue heparin drip   COVID  o Continue severe pathway  o Continue zinc, vit C, vit D, statin  o Pt is on day 2 of ventilation  o Repeat ABG in 1hr after Tv of 410cc is reached  o Consider convalescent plasma  o Consider Remdesivir  o Continue Ceftraixone, D/C doxycyline due to - Legionella      GI:    Diagnosis: UG bleed  o Plan: No evidence of acute bleed, continue monitoring hemoglobins  o Start protonix BID after gtt is over  o NPO except for meds  o GI consult if there is concern for re-bleed  o CBC q 6   Diagnosis: GERD  o Plan: Protonix BID after gtt        :    Diagnosis: ANNALEE   o Plan: resolved continue monitoring renal function on BMP   Diagnosis: Gross hematuria/UTI  o Plan: Continue Ceftriaxone  o Trend BMP and monitor  o Maintain rogers and monitor I/Os      F/E/N:    Plan: Replete electrolytes as needed   Strict I/Os    Isolyte and Albumin boluses as needed for resuscitation      Heme/Onc:    Diagnosis:  Thrombocytopenia/anemia  o Plan: Present before Heparin infusion  o Review peripheral smear and follow CBC q6       Endo:    Diagnosis: DM2 w/ DKA  o Plan: DKA is resolved, continue sliding scale insulin  o Maintain BG btwn 140-180        ID:    Diagnosis: UTI  o Plan: continue ceftriaxone for 5 days minimum  o Follow closely for fevers and leukocytosis        MSK/Skin:    Turn and reposition as needed      Disposition: Admit to Critical Care   Code Status: Level 1 - Full Code  --------------------------------------------------------------------------------------------------------------  Review of Systems    Review of systems was unable to be performed secondary to intubation    Physical Exam  Constitutional:       Appearance: He is ill-appearing  Interventions: He is sedated and intubated  Comments: Intubated and Sedated  HENT:      Head: Normocephalic and atraumatic  Eyes:      Conjunctiva/sclera: Conjunctivae normal       Pupils: Pupils are equal, round, and reactive to light  Neck:      Musculoskeletal: No edema or neck rigidity  Trachea: Trachea normal    Cardiovascular:      Rate and Rhythm: Normal rate and regular rhythm  Pulses: Decreased pulses  Dorsalis pedis pulses are 0 on the right side and 2+ on the left side  Posterior tibial pulses are 0 on the right side and 2+ on the left side  Comments: RLE is cool and pale from the Knee down  Pulmonary:      Effort: Pulmonary effort is normal  He is intubated  Comments: Mechanically ventilated breath sounds b/l  Abdominal:      General: Bowel sounds are normal  There is distension  There are no signs of injury  Palpations: Abdomen is soft  Genitourinary:     Comments: Veras in place  Musculoskeletal:      Right lower leg: No edema  Left lower leg: No edema  Feet:      Right foot:      Skin integrity: Skin integrity normal    Skin:     General: Skin is warm and dry  Capillary Refill: Capillary refill takes less than 2 seconds  Coloration: Skin is pale  Comments: Warm, dry, and good cap refill everywhere except for RLE  RLE is pale, cool to touch  Neurological:      Comments: Sedated and intubated  --------------------------------------------------------------------------------------------------------------  Vitals:    There were no vitals filed for this visit  Temp  Min: 95 4 °F (35 2 °C)  Max: 100 8 °F (38 2 °C)    Temp:  [99 3 °F (37 4 °C)-100 1 °F (37 8 °C)] 99 3 °F (37 4 °C)  HR:  [58-96] 58  Resp:  [24-95] 33  BP: ()/(49-71) 124/58  Arterial Line BP: ()/(32-48) 67/34  FiO2 (%):  [60] 60      There is no height or weight on file to calculate BMI    PAP:      Laboratory and Diagnostics:  Results from last 7 days   Lab Units 01/13/21 0440 01/13/21  0027 01/12/21  1831 01/12/21  1718 01/12/21  1301 01/12/21  1116 01/12/21  0831 01/12/21  0618 01/11/21  1020   WBC Thousand/uL 6 71  --  6 94  --   --   --   --  11 76* 20 20*   HEMOGLOBIN g/dL 9 7* 9 6* 10 3* 10 3* 11 1*  --   --  12 9 15 6   I STAT HEMOGLOBIN g/dl  --   --   --   --   --  9 5* 9 9*  --   --    HEMATOCRIT % 29 2*  --  30 5*  --   --   --   --  37 5 48 3*   HEMATOCRIT, ISTAT %  --   --   --   --   --  28* 29*  --   --    PLATELETS Thousands/uL 44*  --  51*  --   --   --   --  108* 237   NEUTROS PCT %  --   --   --   --   --   --   --  91*  --    BANDS PCT % 23*  --   --   --   --   --   --   --   --    MONOS PCT %  --   --   --   --   --   --   --  3*  --    MONO PCT % 3*  --   --   --   --   --   --   --  7     Results from last 7 days   Lab Units 01/13/21  0840 01/13/21  0440 01/13/21  0027 01/12/21  2042 01/12/21  1718 01/12/21  1301 01/12/21  1116 01/12/21  0835  01/11/21  1422   SODIUM mmol/L 148* 151*  151* 153* 154* 154* 153*  --  160*   < > 150*   POTASSIUM mmol/L 4 3 4 6  4 6 4 7 5 0 5 0 6 3*  --  4 3   < > 3 3*   CHLORIDE mmol/L 121* 122*  120* 124* 123* 124* 125*  --  129*   < > 111*   CO2 mmol/L 20* 21  21 23 22 19* 19*  --  23   < > 10*   CO2, I-STAT mmol/L  --   --   --   --   --   --  20*  --    < >  --    ANION GAP mmol/L 7 8  10 6 9 11 9  --  8   < > 29*   BUN mg/dL 28* 29*  30* 30* 32* 36* 45*  --  45*   < > 64*   CREATININE mg/dL 0 67 0 83  0 75 0 78 0 84 1 03 0 92  --  0 96   < > 1 43*   CALCIUM mg/dL 6 9* 7 0*  7 2* 7 1* 7 1* 7 2* 7 4*  --  7 7*   < > 9 2 GLUCOSE RANDOM mg/dL 174* 172*  172* 222* 199* 260* 252*  --  81   < > 550*   ALT U/L  --  33  --   --   --   --   --   --   --  25   AST U/L  --  77*  --   --   --   --   --   --   --  29   ALK PHOS U/L  --  77  --   --   --   --   --   --   --  114   ALBUMIN g/dL  --  1 4*  --   --   --   --   --   --   --  2 9*   TOTAL BILIRUBIN mg/dL  --  0 50  --   --   --   --   --   --   --  0 70    < > = values in this interval not displayed  Results from last 7 days   Lab Units 01/13/21  0840 01/13/21  0440 01/13/21  0027 01/12/21  2042 01/12/21  1718 01/12/21  1301 01/12/21  0835   MAGNESIUM mg/dL 2 1 2 2 2 3 2 4 2 4 2 4 2 7*   PHOSPHORUS mg/dL 2 1* 2 6 2 0* 2 2* 2 6 4 4* 2 9      Results from last 7 days   Lab Units 01/13/21  0840 01/13/21  0027 01/12/21  1831 01/12/21  0327 01/11/21  1020   INR   --   --  1 70* 1 52* 1 38*   PTT seconds 39* >210* 34 29 26      Results from last 7 days   Lab Units 01/11/21  1020   TROPONIN I ng/mL <0 03     Results from last 7 days   Lab Units 01/13/21  1205 01/12/21  1301 01/12/21  1008 01/12/21  0322 01/12/21  0206 01/11/21  2303 01/11/21  1922   LACTIC ACID mmol/L 1 7 2 4* 2 8* 4 6* 3 3* 4 9* 3 4*     ABG:  Results from last 7 days   Lab Units 01/13/21  0944   PH ART  7 426   PCO2 ART mm Hg 28 9*   PO2 ART mm Hg 67 7*   HCO3 ART mmol/L 18 6*   BASE EXC ART mmol/L -4 8   ABG SOURCE  Line, Arterial     VBG:  Results from last 7 days   Lab Units 01/13/21  0944  01/12/21  1407   PH REAL   --   --  7 322   PCO2 REAL mm Hg  --   --  33 3*   PO2 REAL mm Hg  --   --  70 6*   HCO3 REAL mmol/L  --   --  16 8*   BASE EXC REAL mmol/L  --   --  -8 3   ABG SOURCE  Line, Arterial   < >  --     < > = values in this interval not displayed       Results from last 7 days   Lab Units 01/13/21  0440 01/12/21  0638 01/11/21  1151   PROCALCITONIN ng/ml 1 64* 0 65* 0 31*       Micro:  Results from last 7 days   Lab Units 01/12/21  0640 01/12/21  0206 01/11/21  1937 01/11/21  1926 01/11/21  1020   BLOOD CULTURE   --   --   --  No Growth at 24 hrs  No Growth at 24 hrs  No Growth at 48 hrs  No Growth at 48 hrs  URINE CULTURE  No Growth <1000 cfu/mL  --   --   --   --    MRSA CULTURE ONLY   --   --  No Methicillin Resistant Staphlyococcus aureus (MRSA) isolated  --   --    LEGIONELLA URINARY ANTIGEN   --  Negative  --   --   --    STREP PNEUMONIAE ANTIGEN, URINE   --  Negative  --   --   --        EKG:   Imaging: I have personally reviewed pertinent reports  Historical Information   Past Medical History:   Diagnosis Date    [facility] ([facility] )     GERD (gastroesophageal reflux disease)     Hypercholesteremia     Hypertension     Methadone use ([place] [id number] )      No past surgical history on file    Social History   Social History     Substance and Sexual Activity   Alcohol Use No     Social History     Substance and Sexual Activity   Drug Use No    Comment: methadone     Social History     Tobacco Use   Smoking Status Former Smoker   Smokeless Tobacco Never Used     Exercise History:   Family History:   Family History   Problem Relation Age of Onset    Diabetes Mother     Hypertension Father     Leukemia Father     Acute lymphoblastic leukemia Father     Cancer Sister        Medications:  Current Facility-Administered Medications   Medication Dose Route Frequency    ascorbic acid (VITAMIN C) tablet 1,000 mg  1,000 mg Oral Q12H [address]    atorvastatin (LIPITOR) tablet 40 mg  40 mg Oral HS    [START ON 1/14/2021] cefTRIAXone (ROCEPHIN) 1,000 mg in dextrose 5 % 50 mL IVPB  1,000 mg Intravenous Q24H    chlorhexidine (PERIDEX) 0 12 % oral rinse 15 mL  15 mL Swish & Spit Q12H [address]    [START ON 1/14/2021] cholecalciferol (VITAMIN D3) tablet 2,000 Units  2,000 Units Oral Daily    dexamethasone (PF) (DECADRON) injection 7 3 mg  0 1 mg/kg Intravenous Q12H    dexmedetomidine (PRECEDEX) 400 mcg in sodium chloride 0 9 % 100 mL infusion  0 1-0 7 mcg/kg/hr Intravenous Titrated    doxycycline (VIBRAMYCIN) 100 mg in sodium chloride 0 9 % 100 mL IVPB  100 mg Intravenous Q12H    fentaNYL 1000 mcg in sodium chloride 0 9% 100mL infusion  100 mcg/hr Intravenous Continuous    fentanyl citrate (PF) 100 MCG/2ML 50 mcg  50 mcg Intravenous Q2H PRN    heparin (porcine) 25,000 units in 0 45% NaCl 250 mL infusion (premix)  3-30 Units/kg/hr (Order-Specific) Intravenous Titrated    heparin (porcine) injection 2,800 Units  2,800 Units Intravenous Q1H PRN    heparin (porcine) injection 5,600 Units  5,600 Units Intravenous Q1H PRN    insulin regular (HumuLIN R,NovoLIN R) 1 Units/mL in sodium chloride 0 9 % 100 mL infusion  0 3-21 Units/hr Intravenous Titrated    levalbuterol (XOPENEX) inhalation solution 1 25 mg  1 25 mg Nebulization Q6H    [START ON 2021] zinc sulfate (ZINCATE) capsule 220 mg  220 mg Oral Daily    Followed by   Alok Maximo ON 2021] multivitamin-minerals (CENTRUM ADULTS) tablet 1 tablet  1 tablet Oral Daily    pantoprazole (PROTONIX) 80 mg in sodium chloride 0 9 % 100 mL infusion  8 mg/hr Intravenous Continuous    propofol (DIPRIVAN) 1000 mg in 100 mL infusion (premix)  5-50 mcg/kg/min Intravenous Titrated    sodium chloride (PF) 0 9 % injection 3 mL  3 mL Intravenous Q1H PRN    sodium chloride 3 % inhalation solution 4 mL  4 mL Nebulization Q6H     Home medications:  Prior to Admission Medications   Prescriptions Last Dose Informant Patient Reported? Taking?    Blood Glucose Monitoring Suppl (Laura Birmingham) w/Device KIT  Self No No   Sig: Use to test blood sugars 3 times daily   Empagliflozin (Jardiance) 25 MG TABS   No No   Sig: Take 1 tablet (25 mg total) by mouth every morning   Insulin Pen Needle 31G X 5 MM MISC  Self No No   Sig: by Does not apply route daily Pt to inject 4 X daily   Insulin Pen Needle 31G X 5 MM MISC  Self No No   Si units sq 2X daily   ergocalciferol (VITAMIN D2) 50,000 units   No No   Sig: Take 1 capsule (50,000 Units total) by mouth once a week   erythromycin (ILOTYCIN) ophthalmic ointment  Self Yes No   Si 5 inches daily at bedtime   furosemide (LASIX) 40 mg tablet  Self No No   Sig: Take 1 tablet (40 mg total) by mouth daily   Patient taking differently: Take 40 mg by mouth as needed (on off work days)    gabapentin (NEURONTIN) 100 mg capsule   No No   Sig: Take 1 capsule (100 mg total) by mouth 3 (three) times a day   glucose blood (OneTouch Verio) test strip   No No   Sig: Use to test blood sugars 3 times daily   insulin NPH-insulin regular (NovoLIN 70/30) 100 units/mL subcutaneous injection   No No   Sig: Inject 40 Units under the skin 2 (two) times a day before meals   Patient taking differently: Inject 40 Units under the skin 2 (two) times a day before meals Pt states he takes it "once in awhile"   insulin glargine (LANTUS) 100 units/mL subcutaneous injection   No No   Sig: Inject 10 Units under the skin daily at bedtime   Patient taking differently: Inject 20 Units under the skin daily at bedtime    isosorbide mononitrate (IMDUR) 120 mg 24 hr tablet   No No   Sig: Take 1 tablet (120 mg total) by mouth daily   lisinopril-hydrochlorothiazide (PRINZIDE,ZESTORETIC) 20-25 MG per tablet   No No   Sig: Take 1 tablet by mouth daily   methadone (DOLOPHINE) 10 MG/5ML solution  Self Yes No   Sig: Take 70 mg by mouth   omeprazole (PriLOSEC) 20 mg delayed release capsule   No No   Sig: Take 1 capsule (20 mg total) by mouth daily   ondansetron (ZOFRAN-ODT) 4 mg disintegrating tablet   No No   Sig: Take 1 tablet (4 mg total) by mouth every 6 (six) hours as needed for nausea or vomiting   potassium chloride (K-DUR,KLOR-CON) 20 mEq tablet  Self No No   Sig: Take 2 tablets (40 mEq total) by mouth daily   pravastatin (PRAVACHOL) 40 mg tablet   No No   Sig: Take 1 tablet (40 mg total) by mouth daily   promethazine (PHENERGAN) 25 mg tablet   No No   Sig: TAKE 1 TABLET TWICE A DAY   sitaGLIPtin-metFORMIN (JANUMET)  MG per tablet   No No   Sig: Take 1 tablet by mouth 2 (two) times a day with meals      Facility-Administered Medications: None     Allergies: Allergies   Allergen Reactions    Varenicline      ------------------------------------------------------------------------------------------------------------  Advance Directive and Living Will:      Power of :    POLST:    ------------------------------------------------------------------------------------------------------------  Anticipated Length of Stay is > 2 midnights    Care Time Delivered:         Antoine Simmons        Portions of the record may have been created with voice recognition software  Occasional wrong word or "sound a like" substitutions may have occurred due to the inherent limitations of voice recognition software    Read the chart carefully and recognize, using context, where substitutions have occurred

## 2021-01-14 ENCOUNTER — ANESTHESIA (INPATIENT)
Dept: PERIOP | Facility: HOSPITAL | Age: 61
DRG: 853 | End: 2021-01-14
Payer: COMMERCIAL

## 2021-01-14 ENCOUNTER — ANESTHESIA EVENT (INPATIENT)
Dept: PERIOP | Facility: HOSPITAL | Age: 61
DRG: 853 | End: 2021-01-14
Payer: COMMERCIAL

## 2021-01-14 VITALS — HEART RATE: 101 BPM

## 2021-01-14 LAB
ANION GAP SERPL CALCULATED.3IONS-SCNC: 0 MMOL/L (ref 4–13)
ANION GAP SERPL CALCULATED.3IONS-SCNC: 2 MMOL/L (ref 4–13)
ANION GAP SERPL CALCULATED.3IONS-SCNC: 4 MMOL/L (ref 4–13)
ANION GAP SERPL CALCULATED.3IONS-SCNC: 5 MMOL/L (ref 4–13)
APTT PPP: 105 SECONDS (ref 23–37)
APTT PPP: 168 SECONDS (ref 23–37)
BACTERIA UR QL AUTO: ABNORMAL /HPF
BASE EXCESS BLDA CALC-SCNC: -3.8 MMOL/L
BILIRUB UR QL STRIP: NEGATIVE
BLD SMEAR INTERP: NORMAL
BUN SERPL-MCNC: 19 MG/DL (ref 5–25)
BUN SERPL-MCNC: 19 MG/DL (ref 5–25)
BUN SERPL-MCNC: 22 MG/DL (ref 5–25)
BUN SERPL-MCNC: 23 MG/DL (ref 5–25)
CALCIUM SERPL-MCNC: 6.8 MG/DL (ref 8.3–10.1)
CALCIUM SERPL-MCNC: 7.1 MG/DL (ref 8.3–10.1)
CHLORIDE SERPL-SCNC: 121 MMOL/L (ref 100–108)
CHLORIDE SERPL-SCNC: 122 MMOL/L (ref 100–108)
CK MB SERPL-MCNC: 1 % (ref 0–2.5)
CK MB SERPL-MCNC: 1.3 % (ref 0–2.5)
CK MB SERPL-MCNC: 2.5 % (ref 0–2.5)
CK MB SERPL-MCNC: 345.4 NG/ML (ref 0–5)
CK MB SERPL-MCNC: 497.1 NG/ML (ref 0–5)
CK MB SERPL-MCNC: 953 NG/ML (ref 0–5)
CK SERPL-CCNC: ABNORMAL U/L (ref 39–308)
CLARITY UR: ABNORMAL
CO2 SERPL-SCNC: 22 MMOL/L (ref 21–32)
CO2 SERPL-SCNC: 24 MMOL/L (ref 21–32)
COLOR UR: ABNORMAL
CREAT SERPL-MCNC: 0.63 MG/DL (ref 0.6–1.3)
CREAT SERPL-MCNC: 0.64 MG/DL (ref 0.6–1.3)
CREAT SERPL-MCNC: 0.64 MG/DL (ref 0.6–1.3)
CREAT SERPL-MCNC: 0.69 MG/DL (ref 0.6–1.3)
CRP SERPL QL: 123 MG/L
D DIMER PPP FEU-MCNC: 12.77 UG/ML FEU
ERYTHROCYTE [DISTWIDTH] IN BLOOD BY AUTOMATED COUNT: 14.3 % (ref 11.6–15.1)
ERYTHROCYTE [DISTWIDTH] IN BLOOD BY AUTOMATED COUNT: 14.3 % (ref 11.6–15.1)
ERYTHROCYTE [DISTWIDTH] IN BLOOD BY AUTOMATED COUNT: 14.5 % (ref 11.6–15.1)
FERRITIN SERPL-MCNC: 1860 NG/ML (ref 8–388)
GFR SERPL CREATININE-BSD FRML MDRD: 102 ML/MIN/1.73SQ M
GFR SERPL CREATININE-BSD FRML MDRD: 105 ML/MIN/1.73SQ M
GFR SERPL CREATININE-BSD FRML MDRD: 105 ML/MIN/1.73SQ M
GFR SERPL CREATININE-BSD FRML MDRD: 106 ML/MIN/1.73SQ M
GLUCOSE SERPL-MCNC: 142 MG/DL (ref 65–140)
GLUCOSE SERPL-MCNC: 144 MG/DL (ref 65–140)
GLUCOSE SERPL-MCNC: 145 MG/DL (ref 65–140)
GLUCOSE SERPL-MCNC: 151 MG/DL (ref 65–140)
GLUCOSE SERPL-MCNC: 152 MG/DL (ref 65–140)
GLUCOSE SERPL-MCNC: 158 MG/DL (ref 65–140)
GLUCOSE SERPL-MCNC: 160 MG/DL (ref 65–140)
GLUCOSE SERPL-MCNC: 160 MG/DL (ref 65–140)
GLUCOSE SERPL-MCNC: 169 MG/DL (ref 65–140)
GLUCOSE SERPL-MCNC: 178 MG/DL (ref 65–140)
GLUCOSE SERPL-MCNC: 179 MG/DL (ref 65–140)
GLUCOSE SERPL-MCNC: 181 MG/DL (ref 65–140)
GLUCOSE SERPL-MCNC: 186 MG/DL (ref 65–140)
GLUCOSE SERPL-MCNC: 187 MG/DL (ref 65–140)
GLUCOSE SERPL-MCNC: 231 MG/DL (ref 65–140)
GLUCOSE SERPL-MCNC: 262 MG/DL (ref 65–140)
GLUCOSE UR STRIP-MCNC: ABNORMAL MG/DL
HCO3 BLDA-SCNC: 19.4 MMOL/L (ref 22–28)
HCT VFR BLD AUTO: 25.2 % (ref 36.5–49.3)
HCT VFR BLD AUTO: 25.5 % (ref 36.5–49.3)
HCT VFR BLD AUTO: 28.2 % (ref 36.5–49.3)
HGB BLD-MCNC: 8.3 G/DL (ref 12–17)
HGB BLD-MCNC: 8.3 G/DL (ref 12–17)
HGB BLD-MCNC: 9 G/DL (ref 12–17)
HGB UR QL STRIP.AUTO: ABNORMAL
HOROWITZ INDEX BLDA+IHG-RTO: 70 MM[HG]
HYALINE CASTS #/AREA URNS LPF: ABNORMAL /LPF
KETONES UR STRIP-MCNC: ABNORMAL MG/DL
LACTATE SERPL-SCNC: 1.4 MMOL/L (ref 0.5–2)
LEUKOCYTE ESTERASE UR QL STRIP: ABNORMAL
MAGNESIUM SERPL-MCNC: 2.3 MG/DL (ref 1.6–2.6)
MAGNESIUM SERPL-MCNC: 2.5 MG/DL (ref 1.6–2.6)
MCH RBC QN AUTO: 27.9 PG (ref 26.8–34.3)
MCH RBC QN AUTO: 28.9 PG (ref 26.8–34.3)
MCH RBC QN AUTO: 29 PG (ref 26.8–34.3)
MCHC RBC AUTO-ENTMCNC: 31.9 G/DL (ref 31.4–37.4)
MCHC RBC AUTO-ENTMCNC: 32.5 G/DL (ref 31.4–37.4)
MCHC RBC AUTO-ENTMCNC: 32.9 G/DL (ref 31.4–37.4)
MCV RBC AUTO: 87 FL (ref 82–98)
MCV RBC AUTO: 88 FL (ref 82–98)
MCV RBC AUTO: 89 FL (ref 82–98)
NITRITE UR QL STRIP: NEGATIVE
NON-SQ EPI CELLS URNS QL MICRO: ABNORMAL /HPF
NRBC BLD AUTO-RTO: 0 /100 WBCS
O2 CT BLDA-SCNC: 11.8 ML/DL (ref 16–23)
OXYHGB MFR BLDA: 97.2 % (ref 94–97)
PCO2 BLDA: 28.4 MM HG (ref 36–44)
PEEP RESPIRATORY: 6 CM[H2O]
PH BLDA: 7.45 [PH] (ref 7.35–7.45)
PH UR STRIP.AUTO: 5.5 [PH]
PHOSPHATE SERPL-MCNC: 1.5 MG/DL (ref 2.3–4.1)
PHOSPHATE SERPL-MCNC: 2.4 MG/DL (ref 2.3–4.1)
PLATELET # BLD AUTO: 38 THOUSANDS/UL (ref 149–390)
PLATELET # BLD AUTO: 39 THOUSANDS/UL (ref 149–390)
PLATELET # BLD AUTO: 53 THOUSANDS/UL (ref 149–390)
PMV BLD AUTO: 11.7 FL (ref 8.9–12.7)
PMV BLD AUTO: 12.2 FL (ref 8.9–12.7)
PMV BLD AUTO: 12.2 FL (ref 8.9–12.7)
PO2 BLDA: 112.2 MM HG (ref 75–129)
POTASSIUM SERPL-SCNC: 4.3 MMOL/L (ref 3.5–5.3)
POTASSIUM SERPL-SCNC: 4.4 MMOL/L (ref 3.5–5.3)
POTASSIUM SERPL-SCNC: 4.9 MMOL/L (ref 3.5–5.3)
POTASSIUM SERPL-SCNC: 5.7 MMOL/L (ref 3.5–5.3)
PROCALCITONIN SERPL-MCNC: 1.13 NG/ML
PROT UR STRIP-MCNC: ABNORMAL MG/DL
RBC # BLD AUTO: 2.86 MILLION/UL (ref 3.88–5.62)
RBC # BLD AUTO: 2.87 MILLION/UL (ref 3.88–5.62)
RBC # BLD AUTO: 3.23 MILLION/UL (ref 3.88–5.62)
RBC #/AREA URNS AUTO: ABNORMAL /HPF
SODIUM SERPL-SCNC: 145 MMOL/L (ref 136–145)
SODIUM SERPL-SCNC: 147 MMOL/L (ref 136–145)
SODIUM SERPL-SCNC: 148 MMOL/L (ref 136–145)
SODIUM SERPL-SCNC: 150 MMOL/L (ref 136–145)
SP GR UR STRIP.AUTO: 1.02 (ref 1–1.03)
SPECIMEN SOURCE: ABNORMAL
UROBILINOGEN UR QL STRIP.AUTO: 1 E.U./DL
VENT AC: 20
VENT- AC: AC
VT SETTING VENT: 410 ML
WBC # BLD AUTO: 10.45 THOUSAND/UL (ref 4.31–10.16)
WBC # BLD AUTO: 10.5 THOUSAND/UL (ref 4.31–10.16)
WBC # BLD AUTO: 11.59 THOUSAND/UL (ref 4.31–10.16)
WBC #/AREA URNS AUTO: ABNORMAL /HPF

## 2021-01-14 PROCEDURE — 85027 COMPLETE CBC AUTOMATED: CPT | Performed by: EMERGENCY MEDICINE

## 2021-01-14 PROCEDURE — 82948 REAGENT STRIP/BLOOD GLUCOSE: CPT

## 2021-01-14 PROCEDURE — 88307 TISSUE EXAM BY PATHOLOGIST: CPT | Performed by: PATHOLOGY

## 2021-01-14 PROCEDURE — 94003 VENT MGMT INPAT SUBQ DAY: CPT

## 2021-01-14 PROCEDURE — 81001 URINALYSIS AUTO W/SCOPE: CPT | Performed by: PHYSICIAN ASSISTANT

## 2021-01-14 PROCEDURE — 82553 CREATINE MB FRACTION: CPT | Performed by: PHYSICIAN ASSISTANT

## 2021-01-14 PROCEDURE — 86140 C-REACTIVE PROTEIN: CPT | Performed by: PHYSICIAN ASSISTANT

## 2021-01-14 PROCEDURE — 94760 N-INVAS EAR/PLS OXIMETRY 1: CPT

## 2021-01-14 PROCEDURE — 83735 ASSAY OF MAGNESIUM: CPT | Performed by: PHYSICIAN ASSISTANT

## 2021-01-14 PROCEDURE — 85027 COMPLETE CBC AUTOMATED: CPT | Performed by: PHYSICIAN ASSISTANT

## 2021-01-14 PROCEDURE — 80048 BASIC METABOLIC PNL TOTAL CA: CPT | Performed by: PHYSICIAN ASSISTANT

## 2021-01-14 PROCEDURE — 84100 ASSAY OF PHOSPHORUS: CPT | Performed by: PHYSICIAN ASSISTANT

## 2021-01-14 PROCEDURE — 82805 BLOOD GASES W/O2 SATURATION: CPT | Performed by: INTERNAL MEDICINE

## 2021-01-14 PROCEDURE — 80048 BASIC METABOLIC PNL TOTAL CA: CPT | Performed by: EMERGENCY MEDICINE

## 2021-01-14 PROCEDURE — 94664 DEMO&/EVAL PT USE INHALER: CPT

## 2021-01-14 PROCEDURE — 88311 DECALCIFY TISSUE: CPT | Performed by: PATHOLOGY

## 2021-01-14 PROCEDURE — 85610 PROTHROMBIN TIME: CPT | Performed by: EMERGENCY MEDICINE

## 2021-01-14 PROCEDURE — 99024 POSTOP FOLLOW-UP VISIT: CPT | Performed by: SURGERY

## 2021-01-14 PROCEDURE — 27592 AMPUTATE LEG AT THIGH: CPT | Performed by: SURGERY

## 2021-01-14 PROCEDURE — 82728 ASSAY OF FERRITIN: CPT | Performed by: PHYSICIAN ASSISTANT

## 2021-01-14 PROCEDURE — 94640 AIRWAY INHALATION TREATMENT: CPT

## 2021-01-14 PROCEDURE — 85730 THROMBOPLASTIN TIME PARTIAL: CPT | Performed by: INTERNAL MEDICINE

## 2021-01-14 PROCEDURE — C9113 INJ PANTOPRAZOLE SODIUM, VIA: HCPCS | Performed by: PHYSICIAN ASSISTANT

## 2021-01-14 PROCEDURE — 85730 THROMBOPLASTIN TIME PARTIAL: CPT | Performed by: EMERGENCY MEDICINE

## 2021-01-14 PROCEDURE — 84145 PROCALCITONIN (PCT): CPT | Performed by: PHYSICIAN ASSISTANT

## 2021-01-14 PROCEDURE — 82550 ASSAY OF CK (CPK): CPT | Performed by: PHYSICIAN ASSISTANT

## 2021-01-14 PROCEDURE — 85379 FIBRIN DEGRADATION QUANT: CPT | Performed by: PHYSICIAN ASSISTANT

## 2021-01-14 PROCEDURE — 0Y6C0Z3 DETACHMENT AT RIGHT UPPER LEG, LOW, OPEN APPROACH: ICD-10-PCS | Performed by: SURGERY

## 2021-01-14 PROCEDURE — 99291 CRITICAL CARE FIRST HOUR: CPT | Performed by: INTERNAL MEDICINE

## 2021-01-14 PROCEDURE — 85007 BL SMEAR W/DIFF WBC COUNT: CPT | Performed by: EMERGENCY MEDICINE

## 2021-01-14 RX ORDER — PROPOFOL 10 MG/ML
5-50 INJECTION, EMULSION INTRAVENOUS
Status: DISCONTINUED | OUTPATIENT
Start: 2021-01-14 | End: 2021-01-22

## 2021-01-14 RX ORDER — SODIUM CHLORIDE, SODIUM GLUCONATE, SODIUM ACETATE, POTASSIUM CHLORIDE, MAGNESIUM CHLORIDE, SODIUM PHOSPHATE, DIBASIC, AND POTASSIUM PHOSPHATE .53; .5; .37; .037; .03; .012; .00082 G/100ML; G/100ML; G/100ML; G/100ML; G/100ML; G/100ML; G/100ML
150 INJECTION, SOLUTION INTRAVENOUS CONTINUOUS
Status: CANCELLED | OUTPATIENT
Start: 2021-01-14

## 2021-01-14 RX ORDER — FENTANYL CITRATE 50 UG/ML
INJECTION, SOLUTION INTRAMUSCULAR; INTRAVENOUS AS NEEDED
Status: DISCONTINUED | OUTPATIENT
Start: 2021-01-14 | End: 2021-01-14

## 2021-01-14 RX ORDER — ROCURONIUM BROMIDE 10 MG/ML
INJECTION, SOLUTION INTRAVENOUS AS NEEDED
Status: DISCONTINUED | OUTPATIENT
Start: 2021-01-14 | End: 2021-01-14

## 2021-01-14 RX ORDER — SODIUM CHLORIDE, SODIUM GLUCONATE, SODIUM ACETATE, POTASSIUM CHLORIDE, MAGNESIUM CHLORIDE, SODIUM PHOSPHATE, DIBASIC, AND POTASSIUM PHOSPHATE .53; .5; .37; .037; .03; .012; .00082 G/100ML; G/100ML; G/100ML; G/100ML; G/100ML; G/100ML; G/100ML
1000 INJECTION, SOLUTION INTRAVENOUS ONCE
Status: DISCONTINUED | OUTPATIENT
Start: 2021-01-14 | End: 2021-01-14

## 2021-01-14 RX ORDER — SODIUM CHLORIDE 9 MG/ML
INJECTION, SOLUTION INTRAVENOUS CONTINUOUS PRN
Status: DISCONTINUED | OUTPATIENT
Start: 2021-01-14 | End: 2021-01-14

## 2021-01-14 RX ORDER — HEPARIN SODIUM 10000 [USP'U]/100ML
3-30 INJECTION, SOLUTION INTRAVENOUS
Status: DISCONTINUED | OUTPATIENT
Start: 2021-01-14 | End: 2021-01-20

## 2021-01-14 RX ORDER — ALBUMIN, HUMAN INJ 5% 5 %
25 SOLUTION INTRAVENOUS ONCE
Status: COMPLETED | OUTPATIENT
Start: 2021-01-14 | End: 2021-01-14

## 2021-01-14 RX ORDER — NOREPINEPHRINE BITARTRATE 1 MG/ML
INJECTION, SOLUTION INTRAVENOUS
Status: COMPLETED
Start: 2021-01-14 | End: 2021-01-14

## 2021-01-14 RX ORDER — HEPARIN SODIUM 1000 [USP'U]/ML
6000 INJECTION, SOLUTION INTRAVENOUS; SUBCUTANEOUS
Status: DISCONTINUED | OUTPATIENT
Start: 2021-01-14 | End: 2021-01-20

## 2021-01-14 RX ORDER — OCTREOTIDE ACETATE 50 UG/ML
50 INJECTION, SOLUTION INTRAVENOUS; SUBCUTANEOUS EVERY 8 HOURS SCHEDULED
Status: DISCONTINUED | OUTPATIENT
Start: 2021-01-14 | End: 2021-01-14

## 2021-01-14 RX ORDER — HEPARIN SODIUM 1000 [USP'U]/ML
3000 INJECTION, SOLUTION INTRAVENOUS; SUBCUTANEOUS
Status: DISCONTINUED | OUTPATIENT
Start: 2021-01-14 | End: 2021-01-20

## 2021-01-14 RX ORDER — KETAMINE HCL IN NACL, ISO-OSM 100MG/10ML
SYRINGE (ML) INJECTION AS NEEDED
Status: DISCONTINUED | OUTPATIENT
Start: 2021-01-14 | End: 2021-01-14

## 2021-01-14 RX ORDER — SODIUM CHLORIDE, SODIUM GLUCONATE, SODIUM ACETATE, POTASSIUM CHLORIDE, MAGNESIUM CHLORIDE, SODIUM PHOSPHATE, DIBASIC, AND POTASSIUM PHOSPHATE .53; .5; .37; .037; .03; .012; .00082 G/100ML; G/100ML; G/100ML; G/100ML; G/100ML; G/100ML; G/100ML
1000 INJECTION, SOLUTION INTRAVENOUS ONCE
Status: COMPLETED | OUTPATIENT
Start: 2021-01-14 | End: 2021-01-14

## 2021-01-14 RX ORDER — ALBUMIN, HUMAN INJ 5% 5 %
25 SOLUTION INTRAVENOUS ONCE
Status: DISCONTINUED | OUTPATIENT
Start: 2021-01-14 | End: 2021-01-14 | Stop reason: SDUPTHER

## 2021-01-14 RX ORDER — HYDROMORPHONE HCL/PF 1 MG/ML
1 SYRINGE (ML) INJECTION ONCE
Status: COMPLETED | OUTPATIENT
Start: 2021-01-14 | End: 2021-01-14

## 2021-01-14 RX ORDER — SODIUM CHLORIDE, SODIUM GLUCONATE, SODIUM ACETATE, POTASSIUM CHLORIDE, MAGNESIUM CHLORIDE, SODIUM PHOSPHATE, DIBASIC, AND POTASSIUM PHOSPHATE .53; .5; .37; .037; .03; .012; .00082 G/100ML; G/100ML; G/100ML; G/100ML; G/100ML; G/100ML; G/100ML
1000 INJECTION, SOLUTION INTRAVENOUS ONCE
Status: CANCELLED | OUTPATIENT
Start: 2021-01-14 | End: 2021-01-14

## 2021-01-14 RX ORDER — SODIUM CHLORIDE, SODIUM GLUCONATE, SODIUM ACETATE, POTASSIUM CHLORIDE, MAGNESIUM CHLORIDE, SODIUM PHOSPHATE, DIBASIC, AND POTASSIUM PHOSPHATE .53; .5; .37; .037; .03; .012; .00082 G/100ML; G/100ML; G/100ML; G/100ML; G/100ML; G/100ML; G/100ML
100 INJECTION, SOLUTION INTRAVENOUS CONTINUOUS
Status: DISCONTINUED | OUTPATIENT
Start: 2021-01-14 | End: 2021-01-15

## 2021-01-14 RX ADMIN — PHENYLEPHRINE HYDROCHLORIDE 100 MCG: 10 INJECTION INTRAVENOUS at 14:24

## 2021-01-14 RX ADMIN — DEXAMETHASONE SODIUM PHOSPHATE 7.3 MG: 10 INJECTION, SOLUTION INTRAMUSCULAR; INTRAVENOUS at 07:27

## 2021-01-14 RX ADMIN — SODIUM CHLORIDE 0.7 MCG/KG/HR: 9 INJECTION, SOLUTION INTRAVENOUS at 02:08

## 2021-01-14 RX ADMIN — POTASSIUM PHOSPHATE, MONOBASIC AND POTASSIUM PHOSPHATE, DIBASIC 12 MMOL: 224; 236 INJECTION, SOLUTION, CONCENTRATE INTRAVENOUS at 17:47

## 2021-01-14 RX ADMIN — SODIUM CHLORIDE, SODIUM GLUCONATE, SODIUM ACETATE, POTASSIUM CHLORIDE AND MAGNESIUM CHLORIDE 150 ML/HR: 526; 502; 368; 37; 30 INJECTION, SOLUTION INTRAVENOUS at 06:34

## 2021-01-14 RX ADMIN — ROCURONIUM BROMIDE 50 MG: 50 INJECTION, SOLUTION INTRAVENOUS at 14:27

## 2021-01-14 RX ADMIN — FENTANYL CITRATE 100 MCG: 50 INJECTION INTRAMUSCULAR; INTRAVENOUS at 13:48

## 2021-01-14 RX ADMIN — OXYCODONE HYDROCHLORIDE AND ACETAMINOPHEN 1000 MG: 500 TABLET ORAL at 20:46

## 2021-01-14 RX ADMIN — SODIUM CHLORIDE, SODIUM GLUCONATE, SODIUM ACETATE, POTASSIUM CHLORIDE AND MAGNESIUM CHLORIDE 150 ML/HR: 526; 502; 368; 37; 30 INJECTION, SOLUTION INTRAVENOUS at 16:23

## 2021-01-14 RX ADMIN — PROPOFOL 20 MCG/KG/MIN: 10 INJECTION, EMULSION INTRAVENOUS at 10:48

## 2021-01-14 RX ADMIN — SODIUM CHLORIDE 0.7 MCG/KG/HR: 9 INJECTION, SOLUTION INTRAVENOUS at 18:59

## 2021-01-14 RX ADMIN — FENTANYL CITRATE 50 MCG: 50 INJECTION INTRAMUSCULAR; INTRAVENOUS at 23:50

## 2021-01-14 RX ADMIN — PROPOFOL 15 MCG/KG/MIN: 10 INJECTION, EMULSION INTRAVENOUS at 23:18

## 2021-01-14 RX ADMIN — CHLORHEXIDINE GLUCONATE 0.12% ORAL RINSE 15 ML: 1.2 LIQUID ORAL at 20:46

## 2021-01-14 RX ADMIN — KETAMINE HYDROCHLORIDE 0.4 MG/KG/HR: 50 INJECTION, SOLUTION INTRAMUSCULAR; INTRAVENOUS at 13:25

## 2021-01-14 RX ADMIN — ROCURONIUM BROMIDE 50 MG: 50 INJECTION, SOLUTION INTRAVENOUS at 13:30

## 2021-01-14 RX ADMIN — ATORVASTATIN CALCIUM 40 MG: 40 TABLET, FILM COATED ORAL at 23:05

## 2021-01-14 RX ADMIN — LEVALBUTEROL HYDROCHLORIDE 1.25 MG: 1.25 SOLUTION, CONCENTRATE RESPIRATORY (INHALATION) at 02:43

## 2021-01-14 RX ADMIN — HEPARIN SODIUM 11 UNITS/KG/HR: 10000 INJECTION, SOLUTION INTRAVENOUS at 23:41

## 2021-01-14 RX ADMIN — ALBUMIN (HUMAN) 25 G: 12.5 INJECTION, SOLUTION INTRAVENOUS at 00:46

## 2021-01-14 RX ADMIN — DEXAMETHASONE SODIUM PHOSPHATE 7.3 MG: 10 INJECTION, SOLUTION INTRAMUSCULAR; INTRAVENOUS at 20:46

## 2021-01-14 RX ADMIN — CHLORHEXIDINE GLUCONATE 0.12% ORAL RINSE 15 ML: 1.2 LIQUID ORAL at 08:05

## 2021-01-14 RX ADMIN — HEPARIN SODIUM 15 UNITS/KG/HR: 10000 INJECTION, SOLUTION INTRAVENOUS at 10:39

## 2021-01-14 RX ADMIN — LEVALBUTEROL HYDROCHLORIDE 1.25 MG: 1.25 SOLUTION, CONCENTRATE RESPIRATORY (INHALATION) at 19:18

## 2021-01-14 RX ADMIN — ROCURONIUM BROMIDE 50 MG: 50 INJECTION, SOLUTION INTRAVENOUS at 13:10

## 2021-01-14 RX ADMIN — Medication 2000 UNITS: at 10:38

## 2021-01-14 RX ADMIN — CEFEPIME HYDROCHLORIDE 2000 MG: 2 INJECTION, POWDER, FOR SOLUTION INTRAVENOUS at 10:38

## 2021-01-14 RX ADMIN — Medication 50 MG: at 13:03

## 2021-01-14 RX ADMIN — SODIUM CHLORIDE, SODIUM GLUCONATE, SODIUM ACETATE, POTASSIUM CHLORIDE AND MAGNESIUM CHLORIDE 1000 ML: 526; 502; 368; 37; 30 INJECTION, SOLUTION INTRAVENOUS at 06:33

## 2021-01-14 RX ADMIN — SODIUM CHLORIDE SOLN NEBU 3% 4 ML: 3 NEBU SOLN at 19:18

## 2021-01-14 RX ADMIN — ZINC SULFATE 220 MG (50 MG) CAPSULE 220 MG: CAPSULE at 10:38

## 2021-01-14 RX ADMIN — OXYCODONE HYDROCHLORIDE AND ACETAMINOPHEN 1000 MG: 500 TABLET ORAL at 08:05

## 2021-01-14 RX ADMIN — SODIUM CHLORIDE SOLN NEBU 3% 4 ML: 3 NEBU SOLN at 08:27

## 2021-01-14 RX ADMIN — HYDROMORPHONE HYDROCHLORIDE 1 MG: 1 INJECTION, SOLUTION INTRAMUSCULAR; INTRAVENOUS; SUBCUTANEOUS at 16:18

## 2021-01-14 RX ADMIN — LEVALBUTEROL HYDROCHLORIDE 1.25 MG: 1.25 SOLUTION, CONCENTRATE RESPIRATORY (INHALATION) at 08:27

## 2021-01-14 RX ADMIN — Medication 100 MCG/HR: at 18:57

## 2021-01-14 RX ADMIN — VANCOMYCIN HYDROCHLORIDE 1500 MG: 10 INJECTION, POWDER, LYOPHILIZED, FOR SOLUTION INTRAVENOUS at 07:26

## 2021-01-14 RX ADMIN — FENTANYL CITRATE 50 MCG: 50 INJECTION INTRAMUSCULAR; INTRAVENOUS at 10:25

## 2021-01-14 RX ADMIN — SODIUM CHLORIDE 0.7 MCG/KG/HR: 9 INJECTION, SOLUTION INTRAVENOUS at 10:45

## 2021-01-14 RX ADMIN — VANCOMYCIN HYDROCHLORIDE 1500 MG: 10 INJECTION, POWDER, LYOPHILIZED, FOR SOLUTION INTRAVENOUS at 20:20

## 2021-01-14 RX ADMIN — SODIUM CHLORIDE 3 UNITS/HR: 9 INJECTION, SOLUTION INTRAVENOUS at 17:39

## 2021-01-14 RX ADMIN — FENTANYL CITRATE 50 MCG: 50 INJECTION INTRAMUSCULAR; INTRAVENOUS at 06:33

## 2021-01-14 RX ADMIN — SODIUM CHLORIDE 8 MG/HR: 9 INJECTION, SOLUTION INTRAVENOUS at 11:38

## 2021-01-14 RX ADMIN — Medication 100 MCG/HR: at 08:05

## 2021-01-14 RX ADMIN — SODIUM CHLORIDE: 0.9 INJECTION, SOLUTION INTRAVENOUS at 13:10

## 2021-01-14 RX ADMIN — CEFEPIME HYDROCHLORIDE 2000 MG: 2 INJECTION, POWDER, FOR SOLUTION INTRAVENOUS at 22:23

## 2021-01-14 RX ADMIN — SODIUM CHLORIDE SOLN NEBU 3% 4 ML: 3 NEBU SOLN at 02:43

## 2021-01-14 NOTE — UTILIZATION REVIEW
Initial Clinical Review    Admission: Date/Time/Statement:   Admission Orders (From admission, onward)     Ordered        01/13/21 1430  Inpatient Admission  Once                   Orders Placed This Encounter   Procedures    Inpatient Admission     Standing Status:   Standing     Number of Occurrences:   1     Order Specific Question:   Level of Care     Answer:   Critical Care [15]     Order Specific Question:   Estimated length of stay     Answer:   More than 2 Midnights     Order Specific Question:   Certification     Answer:   I certify that inpatient services are medically necessary for this patient for a duration of greater than two midnights  See H&P and MD Progress Notes for additional information about the patient's course of treatment  Assessment/Plan: 57 yo m with Hx of DM2, HTN  HLD, hep C, and GERD  He was diagnosed with COVID on 12/31  He presented to [facility] on 1/11 with nausea, vomiting and SOB ,altered mental status  He was found to be in DKA and was transferred to DR PEDRO CANALES [facility] (higher level of care 0 He developed worsening respiratory status  Ad was intubated on 1/12  Prior to that he developed hematemesis, GI eval his Hg stabilized and OG tube showed no blood  Cleared for anticoagulation  He then  developed hematuria, Urology performed a cystoscopy to place a 3 way rogers, cleared clots  His RLE is pale and cool  Vascular surgery consult - found to have acute limb ischemia,due ot suspected emboli  From aotric thrombus on CTA  he is thus transferred to [facility]  For surgical intervention  He I s admitted inpatient for Acute respiratory distress requiring Intubation due to COVID  on a vent, DKA, now resolving,  And now acute limbischemia requiring intervention       Op report 1/13 @ 8:13 pm - Procedure(s) (LRB):  EMBOLECTOMY/THROMBECTOMY LOWER EXTREMITY (Right)  ARTERIOGRAM (Right)  Operative Findings: Extensive thrombus within SFA, PFA, Pop, and Tibial      1/14: POD 1:  R embolectomy and Agram,  Embolic event caused Pat III acute limb ischemia, surgical revascularization attempted, but it is obvious  The leg is not and was not salvageable despite aggressive surgical revascularization  The leg has developed more skin mottling blistering, the limb remains cold to the touch  Pt in MICU - intubated on Vent -  Heparin gtt, frequent neurovascular checks  Vascular Surgery - Pt will require a R AKA not needed to be done emergently - guarded prognosis  He continues on ABX for bacteremia, overnight he was hypotensive  Now off propofol, remains on Precedex and fentanyl  Levophed now off  Pt is alert  But answering , by shaking his head, however he responds yes to all question making a neurologic exam non-performable  Urine red in collection bag       Vitals   Temperature Pulse Respirations Blood Pressure SpO2   01/13/21 1600 01/13/21 1500 01/13/21 1600 01/13/21 1600 01/13/21 1600   99 5 °F (37 5 °C) 70 (!) 33 124/58 100 %      Temp Source Heart Rate Source Patient Position - Orthostatic VS BP Location FiO2 (%)   01/13/21 1600 01/13/21 1600 01/14/21 0417 01/14/21 0417 01/13/21 1600   Rectal Monitor Lying Right arm 60      Pain Score       01/13/21 1532       Med Not Given for Pain - for MAR use only          Wt Readings from Last 1 Encounters:   01/13/21 85 1 kg (187 lb 9 8 oz)     Additional Vital Signs:  Date/Time  Temp  Pulse  Resp  BP  MAP (mmHg)  Arterial Line BP  MAP  SpO2  FiO2 (%)  O2 Device  Patient Position - Orthostatic VS  CVP  CVP (mean)   01/14/21 0800  99 °F (37 2 °C)  88  37Abnormal       136/58  84 mmHg  98 %  80  Ventilator         01/14/21 0730  99 °F (37 2 °C)  72  36Abnormal       118/50  74 mmHg  100 %             01/14/21 0700  99 °F (37 2 °C)  76  36Abnormal       120/52  76 mmHg  99 %             01/14/21 0600  99 °F (37 2 °C)  86  37Abnormal       128/56  80 mmHg  99 %             01/14/21 0500  98 6 °F (37 °C)  88  36Abnormal     114/48  70 mmHg  97 %             01/14/21 0417  98 6 °F (37 °C)  82  35Abnormal   137/62  87  138/52  78 mmHg  98 %    Ventilator  Lying       01/14/21 0300  99 °F (37 2 °C)  82  34Abnormal       140/52  82 mmHg  100 %             01/14/21 0243                100 %             01/14/21 0200  98 6 °F (37 °C)  68  31Abnormal       118/50  74 mmHg  100 %             01/14/21 0100  98 1 °F (36 7 °C)  66  32Abnormal       128/54  80 mmHg  100 %             01/14/21 0000  98 1 °F (36 7 °C)  64  20      92/48  66 mmHg  99 %             01/13/21 2319    72  20      138/60  86 mmHg  87 %Abnormal     Ventilator         01/13/21 2318                90 %             01/13/21 2309    75  19  113/55        99 %    Ventilator         01/13/21 1930    78  33Abnormal           100 %             01/13/21 1915                98 %             01/13/21 1830    60  34Abnormal       108/40  62 mmHg  100 %          0 mmHg   01/13/21 1800    64  33Abnormal       118/44  68 mmHg  100 %          0 mmHg   01/13/21 1730    68  33Abnormal       134/48  76 mmHg  100 %          0 mmHg   01/13/21 1700    72  34Abnormal       136/50  80 mmHg  100 %          0 mmHg   01/13/21 1600  99 5 °F (37 5 °C)  66  33Abnormal   124/58    122/46  70 mmHg  100 %  60  Ventilator      0 mmHg             Pertinent Labs/Diagnostic Test Results:       Results from last 7 days   Lab Units 01/14/21  0408 01/13/21  2328 01/13/21  1746 01/13/21  1611 01/13/21  0440  01/12/21  0618 01/11/21  1020   WBC Thousand/uL 10 45* 10 30*  --  7 65 6 71   < > 11 76* 20 20*   HEMOGLOBIN g/dL 9 0* 8 9* 9 1* 9 1* 9 7*   < > 12 9 15 6   I STAT HEMOGLOBIN   --   --   --   --   --    < >  --   --    HEMATOCRIT % 28 2* 27 2* 27 0* 26 5* 29 2*   < > 37 5 48 3*   HEMATOCRIT, ISTAT   --   --   --   --   --    < >  --   --    PLATELETS Thousands/uL 39* 41*  -- 34* 44*   < > 108* 237   NEUTROS ABS Thousands/µL  --   --   --   --   --   --  10 78*  --    TOTAL NEUT ABS Thousand/uL  --   --   --   --   --   --   --  17 57*   BANDS PCT %  --   --   --  14* 23*  --   --   --     < > = values in this interval not displayed           Results from last 7 days   Lab Units 01/14/21  0408 01/13/21  2328 01/13/21  0840 01/13/21  0440 01/13/21  0027 01/12/21  2042   SODIUM mmol/L 147* 145 148* 151*  151* 153* 154*   POTASSIUM mmol/L 4 4 5 7* 4 3 4 6  4 6 4 7 5 0   CHLORIDE mmol/L 121* 121* 121* 122*  120* 124* 123*   CO2 mmol/L 24 24 20* 21  21 23 22   ANION GAP mmol/L 2* 0* 7 8  10 6 9   BUN mg/dL 22 23 28* 29*  30* 30* 32*   CREATININE mg/dL 0 69 0 64 0 67 0 83  0 75 0 78 0 84   EGFR ml/min/1 73sq m 102 105 103 94  98 97 94   CALCIUM mg/dL 7 1* 6 8* 6 9* 7 0*  7 2* 7 1* 7 1*   MAGNESIUM mg/dL 2 5  --  2 1 2 2 2 3 2 4   PHOSPHORUS mg/dL 2 4  --  2 1* 2 6 2 0* 2 2*     Results from last 7 days   Lab Units 01/13/21  0440 01/12/21  0835 01/11/21  1422   AST U/L 77*  --  29   ALT U/L 33  --  25   ALK PHOS U/L 77  --  114   TOTAL PROTEIN g/dL 5 2*  --  6 8   ALBUMIN g/dL 1 4*  --  2 9*   TOTAL BILIRUBIN mg/dL 0 50  --  0 70   BILIRUBIN DIRECT mg/dL  --   --  0 40*   AMMONIA umol/L  --  36*  --      Results from last 7 days   Lab Units 01/14/21  0641 01/14/21  0417 01/14/21  0214 01/13/21  2355 01/13/21  1730 01/13/21  1533 01/13/21  1158 01/13/21  0954 01/13/21  0758 01/13/21  0558 01/13/21  0438 01/13/21  0234   POC GLUCOSE mg/dl 152* 186* 179* 262* 223* 187* 141* 133 184* 190* 170* 233*     Results from last 7 days   Lab Units 01/14/21  0408 01/13/21  2328 01/13/21  0840 01/13/21  0440 01/13/21  0027 01/12/21  2042 01/12/21  1718 01/12/21  1301 01/12/21  0835 01/12/21  0638 01/12/21  0322 01/11/21  1922   GLUCOSE RANDOM mg/dL 160* 231* 174* 172*  172* 222* 199* 260* 252* 81 85 140 261*     BETA-HYDROXYBUTYRATE   Date Value Ref Range Status   01/11/2021 6 2 (H) <0 6 mmol/L Final      Results from last 7 days   Lab Units 01/13/21  1746 01/13/21  0944 01/12/21  1718   PH ART  7 465* 7 426 7 383   PCO2 ART mm Hg 22 4* 28 9* 30 9*   PO2 ART mm Hg 109 7 67 7* 112 2   HCO3 ART mmol/L 15 8* 18 6* 18 0*   BASE EXC ART mmol/L -6 7 -4 8 -6 1   O2 CONTENT ART mL/dL 13 1* 13 5* 14 9*   O2 HGB, ARTERIAL % 97 5* 92 7* 97 2*   ABG SOURCE  Line, Arterial Line, Arterial Line, Arterial     Results from last 7 days   Lab Units 01/12/21  1407 01/11/21  1720 01/11/21  1151   PH REAL  7 322 7 310 7 110*   PCO2 REAL mm Hg 33 3* 23 2 22 8   PO2 REAL mm Hg 70 6* 95 0* 70 0*   HCO3 REAL mmol/L 16 8* 11 4* 6 9*   BASE EXC REAL mmol/L -8 3 -13 1 -22 7   O2 CONTENT REAL ml/dL 14 3 19 9 19 5   O2 HGB, VENOUS % 90 4* 94 9 83 5     Results from last 7 days   Lab Units 01/12/21  1116 01/12/21  0831   I STAT BASE EXC mmol/L -4* -5*   I STAT O2 SAT % 88* 92*   ISTAT PH ART  7 432 7 344*   I STAT ART PCO2 mm HG 28 9* 37 0   I STAT ART PO2 mm HG 51 0* 67 0*   I STAT ART HCO3 mmol/L 19 3* 20 2*     Results from last 7 days   Lab Units 01/13/21  2328 01/11/21  1922   CK TOTAL U/L 37,401* 224   CK MB INDEX % 2 5 2 8*   CK MB ng/mL 953 0* 6 2*     Results from last 7 days   Lab Units 01/11/21  1020   TROPONIN I ng/mL <0 03     Results from last 7 days   Lab Units 01/14/21  0408 01/13/21  0440 01/11/21  1922   D-DIMER QUANTITATIVE ug/ml FEU 12 77* 18 65* 8 30*     Results from last 7 days   Lab Units 01/14/21  0421 01/13/21  1746 01/13/21  0840  01/12/21  1831 01/12/21  0327 01/11/21  1020   PROTIME seconds  --   --   --   --  20 0* 18 4* 16 8*   INR   --   --   --   --  1 70* 1 52* 1 38*   PTT seconds 168* 113* 39*   < > 34 29 26    < > = values in this interval not displayed       Results from last 7 days   Lab Units 01/14/21  0408 01/13/21  0440 01/12/21  0638 01/11/21  1151   PROCALCITONIN ng/ml 1 13* 1 64* 0 65* 0 31*     Results from last 7 days   Lab Units 01/13/21  2328 01/13/21  1205 01/12/21  1301 01/12/21  1008 01/12/21  0322 01/12/21  0206 01/11/21  2303   LACTIC ACID mmol/L 1 4 1 7 2 4* 2 8* 4 6* 3 3* 4 9*     Results from last 7 days   Lab Units 01/11/21 1922   NT-PRO BNP pg/mL 448*     Results from last 7 days   Lab Units 01/14/21  0408 01/13/21  0440 01/12/21  0638   FERRITIN ng/mL 1,860* 1,608* 1,716*     Results from last 7 days   Lab Units 01/11/21 1922   HEP B S AG  Non-reactive   HEP C AB  High Reactive*   HEP B C IGM  Non-reactive   HEP B C TOTAL AB  Reactive*     Results from last 7 days   Lab Units 01/12/21  0638 01/11/21  1422   LIPASE u/L 3,951* 539*     Results from last 7 days   Lab Units 01/14/21  0408 01/13/21  0440 01/12/21  0638 01/11/21  1922   CRP mg/L 123 0* 279 4* 108 1* 80 0*     Results from last 7 days   Lab Units 01/12/21  0640 01/12/21  0206   CLARITY UA  Cloudy Cloudy   COLOR UA  Nelsy Red   SPEC GRAV UA  1 025 1 020   PH UA  6 0 6 0   GLUCOSE UA mg/dl Negative 250 (1/4%)*   KETONES UA mg/dl Negative 15 (1+)*   BLOOD UA  Large* Large*   PROTEIN UA mg/dl 100 (2+)* 100 (2+)*   NITRITE UA  Negative Negative   BILIRUBIN UA  Negative Moderate*   UROBILINOGEN UA E U /dl 1 0 0 2   LEUKOCYTES UA  Trace* Negative   WBC UA /hpf 4-10* 10-20*   RBC UA /hpf 30-50* Innumerable*   BACTERIA UA /hpf Moderate* Innumerable*   EPITHELIAL CELLS WET PREP /hpf Occasional Moderate*     Results from last 7 days   Lab Units 01/12/21  0206   STREP PNEUMONIAE ANTIGEN, URINE  Negative   LEGIONELLA URINARY ANTIGEN  Negative     Results from last 7 days   Lab Units 01/12/21  0640 01/11/21  1926 01/11/21  1020   BLOOD CULTURE   --  No Growth at 48 hrs  No Growth at 48 hrs  No Growth at 48 hrs  No Growth at 48 hrs     URINE CULTURE  No Growth <1000 cfu/mL  --   --      Results from last 7 days   Lab Units 01/13/21  0440 01/11/21  1020   TOTAL COUNTED  100 100       Past Medical History:   Diagnosis Date    Diabetes mellitus ([facility] [address] )     GERD (gastroesophageal reflux disease)     Hypercholesteremia     Hypertension     Methadone use ([facility] [address] )      Present on Admission:   Pneumonia due to COVID-19 virus   [facility]   Sepsis due to COVID-19 [facility])   Diabetic ketoacidosis without coma associated with type 2 diabetes mellitus (HCC)   Aortic thrombus (HCC)   Acute metabolic encephalopathy   Metabolic acidosis, increased anion gap   Acute kidney injury ([facility] )   Hypernatremia      Admitting Diagnosis: Pneumonia due to COVID-19 virus [U07 1, J12 82]  Age/Sex: 56 y o  male         Admission Orders:  Scheduled Medications:  ascorbic acid, 1,000 mg, Oral, Q12H [address]  atorvastatin, 40 mg, Oral, HS  cefepime, 2,000 mg, Intravenous, Q12H  chlorhexidine, 15 mL, Swish & Spit, Q12H [address]  cholecalciferol, 2,000 Units, Oral, Daily  dexamethasone, 0 1 m g/kg, Intravenous, Q12H  levalbuterol, 1 25 mg, Nebulization, Q6H  multi-electrolyte, 1,000 mL, Intravenous, Once  zinc sulfate, 220 mg, Oral, Daily    Followed by  Gretchen Zuniga ON 1/19/2021] multivitamin-minerals, 1 tablet, Oral, Daily  sodium chloride, 4 mL, Nebulization, Q6H  vancomycin, 20 mg/kg (Adjusted), Intravenous, Q12H      Continuous IV Infusions:  dexmedetomidine, 0 1-0 7 mcg/kg/hr, Intravenous, Titrated  fentaNYL, 100 mcg/hr, Intravenous, Continuous  heparin (porcine), 3-30 Units/kg/hr (Order-Specific), Intravenous, Titrated  insulin regular (HumuLIN R,NovoLIN R) infusion, 0 3-21 Units/hr, Intravenous, Titrated  multi-electrolyte, 150 mL/hr, Intravenous, Continuous  norepinephrine, 1-30 mcg/min, Intravenous, Titrated  pantoprozole (PROTONIX) infusion (Continuous), 8 mg/hr, Intravenous, Continuous      PRN Meds:  fentanyl citrate (PF), 50 mcg, Intravenous, Q2H PRN  heparin (porcine), 2,800 Units, Intravenous, Q1H PRN  heparin (porcine), 5,600 Units, Intravenous, Q1H PRN  sodium chloride (PF), 3 mL, Intravenous, Q1H PRN          Network Utilization Review Department  ATTENTION: Please call with any questions or concerns to [phone] and carefully listen to the prompts so that you are directed to the right person  All voicemails are confidential   Rosa Richard all requests for admission clinical reviews, approved or denied determinations and any other requests to dedicated fax number below belonging to the campus where the patient is receiving treatment   List of dedicated fax numbers for the Facilities:  [address] DENIALS (Administrative/Medical Necessity) [phone]   [address] (Maternity/NICU/Pediatrics) [phone]   [address] Malia Robles 9388 (  Kana Epps "Cara" 103) [address] [place] [address] Angeline Roxanne Almeida [address] [place] [address] [phone]

## 2021-01-14 NOTE — ANESTHESIA POSTPROCEDURE EVALUATION
Post-Op Assessment Note    CV Status:  Stable  Pain Score: 0       Post-procedure mental status: sedated  Hydration Status:  Stable   PONV Controlled:  None   Airway patency: ett  Post Op Vitals Reviewed: Yes      Staff: Anesthesiologist, CRNA   Comments: pt transferred to micu,m marlen, vss, on monitors  report to micu staff, marlen, vss, on monitors  No complications documented      BP   100/47   Temp      Pulse  96   Resp  20   SpO2   96

## 2021-01-14 NOTE — ANESTHESIA PREPROCEDURE EVALUATION
Procedure:  AMPUTATION ABOVE KNEE (AKA) (Right Leg Upper)    Relevant Problems   CARDIO   (+) Aortic thrombus (HCC)   (+) Essential hypertension   (+) Mixed hyperlipidemia   (+) SOB (shortness of breath) on exertion      ENDO   (+) Type 2 diabetes mellitus with diabetic polyneuropathy (HCC)      GI/HEPATIC   (+) Acute pancreatitis   (+) Chronic hepatitis C without hepatic coma (HCC)      /RENAL   (+) Acute kidney injury (HCC)      NEURO/PSYCH   (+) Type 2 diabetes mellitus with diabetic polyneuropathy (HCC)      PULMONARY   (+) Acute respiratory failure with hypoxia (HCC)   (+) Pneumonia due to COVID-19 virus   (+) SOB (shortness of breath) on exertion      Other   (+) Acute metabolic encephalopathy   (+) Diabetic ketoacidosis without coma associated with type 2 diabetes mellitus (HCC)   (+) Hyperlipidemia associated with type 2 diabetes mellitus (HCC)   (+) Methadone maintenance therapy patient ([facility] [address] )   (+) Sepsis due to COVID-19 [facility])        Physical Exam    Airway  Comment: Intubated from ICU           Dental       Cardiovascular      Pulmonary      Other Findings        Anesthesia Plan  ASA Score- 4 Emergent    Anesthesia Type- general with ASA Monitors  Additional Monitors: arterial line and central venous line  Airway Plan:           Plan Factors-    Induction- intravenous  Postoperative Plan-     Informed Consent- Anesthetic plan and risks discussed with healthcare power of  (Consent obtained over telephone from Shravan rizvi)  I personally reviewed this patient with the CRNA  Discussed and agreed on the Anesthesia Plan with the CRNA  Anali Baig

## 2021-01-14 NOTE — OP NOTE
OPERATIVE REPORT  PATIENT NAME: Carlos Ramos    :  1960  MRN: [id number]  Pt Location: BE OR ROOM 15    SURGERY DATE: 2021    Surgeon(s) and Role:     * Marianela Friedman MD - Primary     * Bebeto Weldon MD - Assisting    Preop Diagnosis:  Aortic thrombus ([address] ) [L40 21]  Metabolic acidosis, increased anion gap [E87 2]  Lower limb ischemia [I99 8]  Acute kidney injury ([address] ) [N17 9]    Post-Op Diagnosis Codes:     * Aortic thrombus (HCC) [Y41 85]     * Metabolic acidosis, increased anion gap [E87 2]     * Lower limb ischemia [I99 8]     * Acute kidney injury ([address] ) [N17 9]    Procedure(s) (LRB):  GUILLOTENE AMPUTATION ABOVE KNEE (AKA) (Right)    Specimen(s):  ID Type Source Tests Collected by Time Destination   1 : RIGHT ABOVE KNEE AMPUTATION (AKA) Tissue Leg, Right TISSUE EXAM Marianela Friedman MD 2021 1352        Estimated Blood Loss:   Minimal    Drains:  NG/OG/Enteral Tube Orogastric 16 Fr (Active)   Placement Reverification Auscultation 21 0802   Site Assessment Clean;Dry; Intact 21 0802   Status Suction-low intermittent 21 0802   Drainage Appearance [place]; Thin 21 1530   Intake (mL) 30 mL 21 1001   Output (mL) 0 mL 21 1200   Number of days: 2       Urethral Catheter 18 Fr  (Active)   Reasons to continue Urinary Catheter  Accurate I&O assessment in critically ill patients (48 hr  max) 21 1100   Goal for Removal Other (Comment) 21 1530   Site Assessment Clean;Skin intact 21 0802   Collection Container Standard drainage bag 21 0802   Securement Method Securing device (Describe) 21 0802   Output (mL) 225 mL 21 1201   Number of days:        Anesthesia Type:   General    Operative Indications:   Aortic thrombus (HCC) [E03 62]  Metabolic acidosis, increased anion gap [E87 2]  Lower limb ischemia [I99 8]  Acute kidney injury ([address] ) [N17 9]      Operative Findings:  Necrotic muscle in the posterior compartment    Complications: None    Procedure and Technique:  The procedure was performed under  general anesthesia  The patient  was placed supine  The right lower extremity was prepped  and draped in the usual sterile fashion  An occlusive dressing was  applied to the leg tip to the level of the knee  Tourniquet was applied and inflated to 300mmHg  Incision was outlined with a marking  pen 5 cm proximal to the knee joint  The skin incision was then performed and deepened through  the subcutaneous tissue until the muscular fascia was identified  The greater saphenous vein was identified, ligated with 2-0 silk  ties, and divided  The muscle groups over the anterior and medial thighs were  divided with electrocautery at the same level of the skin incision  The neurovascular bundle was identified on the medial aspect of  the thigh  The popliteal artery and veins were isolated and suture  ligated with a 2-0 silk ligature  The sciatic nerve was pulled,  ligated with a 2-0 silk tie, and divided  The posterior thigh muscles  were then divided with electrocautery  Once the muscle groups  were circumferentially divided, the periosteum was incised and  elevated approximately 5 cm proximally off the femur  The femur  was divided with an electric saw  The amputation stump  was irrigated copiously with antibiotic solution  Hemostasis was  secured  Wound was left open and  dressed with 4 × 4 gauze, Kerlix, and an Ace bandage  I was present for the entire procedure    Patient Disposition:  Critical Care Unit, intubated and critically ill and patient will return to OR for planned surgery as a staged procedure for closure of AKA stump once condition improves      SIGNATURE: Margarette Bloch, MD  DATE: January 14, 2021  TIME: 2:30 PM

## 2021-01-14 NOTE — PROGRESS NOTES
Daily Progress Note - Critical Care   Quinton Older 56 y o  male MRN: [id number]  Unit/Bed#: MICU 11 Encounter: [id number]        ----------------------------------------------------------------------------------------  HPI/24hr events:     Patient was taken to the OR by vascular surgery an attempt at revascularization was made; however, the leg was found to be nonsalvageable  Patient will require non emergent right above-the-knee amputation  Furthermore, due to hypotension, antibiotic coverage was broadened overnight and patient was started on norepinephrine  Notably, his CK is also 37,401  Renal function unaffected at this time  Patient is now off of propofol due to hypotension  He remains on Precedex and fentanyl  Increasing FiO2 requirements overnight  Current vent settings:  CMV, 20, 410, 6, 80  He is no longer on Levophed  Patient remained afebrile overnight  Maps were in the 70s to [de-identified]  Pulse 82, tachypnea  No leukocytosis, hemoglobin stable  K 4 4, phos 2 4, mag 2 5  Creatinine   69  Glucose in the 150s to 260s range  Elevated inflammatory markers:  , D-dimer 12 77, procalcitonin 1 13, ferritin 1860    ---------------------------------------------------------------------------------------  SUBJECTIVE    Patient is alert while sedated with Precedex and fentanyl  While it seemed that he was shaking his head yes or no to answer questions, it got to a point where he was shaking his head yes for everything even when the answer was clearly no  Review of Systems   Unable to perform ROS: Intubated     Review of systems was unable to be performed secondary to intubation and sedation    ---------------------------------------------------------------------------------------  Assessment and Plan:    Neuro:   · Diagnosis: Encephalopathy  ? Plan:  ICU care, pain and sedation  ? Goal RASS -2 to 0  § Sedation with Precedex, fentanyl  ? CAM-ICU  ?  Sleep-Wake Cycle Regulation     CV: · Diagnosis:  Aortic thrombus,  history of hypertension with hypotension  ? Plan:   ? Goal map greater than 65  ? Hold home BP meds  ? Levophed p r n   ? Volume resuscitation as needed  ? Isolyte and albumin  ? Continue to closely monitor hemodynamics  ? Closely monitor on tele  ? Continue heparin drip  ? Vascular surgery following     Pulm:  · Diagnosis:  Acute COVID-19 infection, acute hypoxic respiratory failure, abnormal CXR, Pulmonary embolism  ? Plan:   ? Patient intubated on 01/12 due to hypoxia  ? Continue zinc, vitamin-D, vitamin-C, statin  ? Consider pressure support  ? Severe COVID-19 pathway  ? Remdesivir not given  ? Good pulm hygene  ? Frequent suctioning  ? Continue cefepime        GI:   · Diagnosis: GERD, Suspected Upper GI bleed  ? Plan:   ? No evidence of active bleeding  ? Continue Protonix drip  ? NPO except for meds  ? Contact GI and endoscopy if blood in the OG-tube or signs and symptoms of GI bleeding  ? Q6hour CBCs        :   · Diagnosis:  Gross hematuria, ANNALEE that has resolved, UTI, likely myoglobinuria  ? Plan:   ? Moderate bacteria on urine microscopy  ? Continue cefepime  ? Follow-up UA with microscopy  ? Trend BMP  ? Per Urology, maintain Veras catheter for 7 days  ? Monitor for bleeding  ? Strict I&Os         F/E/N:   · Plan:   · Fluids:  Isolyte and albumin for resuscitation  · Electrolytes: Trend and replete as needed  · Nutrition:   Consider starting feedings        Heme/Onc:   · Diagnosis: Thrombocytopenia  ? Plan:  ? Predated initiation of heparin  ? Peripheral smear  ? No schistocytes noted on peripheral smear; however, poikilocytes, tear drop cells, linda cells, anisocytosis present  ? Q6hour CBC        Endo:   · Diagnosis: DMII, DKA - now resolved  ? Plan:   § Continue ISS   § Maintain BS between 140-180  § Hold D5W due to rate of correction of hypernatremia        ID:   · Diagnosis:  Decreased blood pressures  ?  Plan:   ? Continue to closely monitor for signs of infection  ? Trend daily fever curve and WBC  ? Continue cefepime     MSK/Skin:   · Diagnosis:   ? Plan:   ? Continue to turn and reposition frequently  ? Continue pressure off-loading  ? PT/OT    Disposition: Continue Critical Care   Code Status: Level 1 - Full Code  ---------------------------------------------------------------------------------------  ICU CORE MEASURES    Prophylaxis   VTE Pharmacologic Prophylaxis: Heparin Drip  VTE Mechanical Prophylaxis: sequential compression device - on LLE  Stress Ulcer Prophylaxis: Pantoprazole IV     ABCDE Protocol (if indicated)  Plan to perform spontaneous awakening trial today? Yes  Plan to perform spontaneous breathing trial today? No  Obvious barriers to extubation? Yes  CAM-ICU: Negative    Invasive Devices Review  Invasive Devices     Central Venous Catheter Line            CVC Central Lines 21 Triple Right 1 day          Peripheral Intravenous Line            Peripheral IV 21 Left;Upper Arm 2 days    Peripheral IV 21 Right Forearm 2 days          Arterial Line            Arterial Line 21 Radial 1 day          Drain            Urethral Catheter 18 Fr  -- days    NG/OG/Enteral Tube Orogastric 16 Fr 2 days          Airway            ETT  Oral;Hi-Lo; Cuffed 7 5 mm 2 days              Can any invasive devices be discontinued today?  No  ---------------------------------------------------------------------------------------  OBJECTIVE    Vitals   Vitals:    21 0200 21 0243 21 0300 21 0417   BP:    137/62   Pulse: 68  82 82   Resp: (!) 31  (!) 34 (!) 35   Temp: 98 6 °F (37 °C)  99 °F (37 2 °C) 98 6 °F (37 °C)   TempSrc:       SpO2: 100% 100% 100% 98%   Weight:       Height:         Temp (24hrs), Av 7 °F (37 1 °C), Min:98 1 °F (36 7 °C), Max:99 5 °F (37 5 °C)  Current: Temperature: 98 6 °F (37 °C)  HR: 82  BP: 137/62  RR: 35  SpO2: 98    Respiratory:  SpO2: SpO2: 98 %       Invasive/non-invasive ventilation settings Respiratory    Lab Data (Last 4 hours)    None         O2/Vent Data (Last 4 hours)    None                Physical Exam  Vitals signs reviewed  Constitutional:       Appearance: He is ill-appearing  HENT:      Head: Normocephalic  Right Ear: External ear normal       Left Ear: External ear normal       Nose: Nose normal       Mouth/Throat:      Mouth: Mucous membranes are moist    Eyes:      Pupils: Pupils are equal, round, and reactive to light  Neck:      Musculoskeletal: Neck supple  Cardiovascular:      Rate and Rhythm: Normal rate  Pulmonary:      Breath sounds: Rhonchi (Diffuse) present  Comments: On ventilator  Abdominal:      Palpations: Abdomen is soft  Genitourinary:     Comments: Red urine in collection bag, no clots  Musculoskeletal:      Right lower leg: Edema present  Comments: Demarcation now present on the right lower extremity just below the knee  Popliteal pulse in the right lower extremity now present  Bullae are forming on the distal right lower extremity and this limb remains cold to the touch  Skin:     Capillary Refill: Capillary refill takes less than 2 seconds  Except for the right lower extremity below-the-knee     Findings: Lesion present  Comments: Warm and dry skin except for the distal right lower extremity below-the-knee   Neurological:      Mental Status: He is alert        Comments: Patient is alert but answering, by shaking his head yes or no, inappropriately making simple neurologic exam non-performable   Psychiatric:      Comments: Unable to assess due to intubation and sedation         Laboratory and Diagnostics:  Results from last 7 days   Lab Units 01/14/21  0408 01/13/21  2328 01/13/21  1746 01/13/21  1611 01/13/21  0440 01/13/21  0027 01/12/21  1831  01/12/21  1116  01/12/21  0618 01/11/21  1020   WBC Thousand/uL 10 45* 10 30*  --  7 65 6 71  --  6 94  --   --   --  11 76* 20 20*   HEMOGLOBIN g/dL 9 0* 8 9* 9 1* 9 1* 9 7* 9 6* 10 3*   < > --   --  12 9 15 6   I STAT HEMOGLOBIN g/dl  --   --   --   --   --   --   --   --  9 5*   < >  --   --    HEMATOCRIT % 28 2* 27 2* 27 0* 26 5* 29 2*  --  30 5*  --   --   --  37 5 48 3*   HEMATOCRIT, ISTAT %  --   --   --   --   --   --   --   --  28*   < >  --   --    PLATELETS Thousands/uL 39* 41*  --  34* 44*  --  51*  --   --   --  108* 237   NEUTROS PCT %  --   --   --   --   --   --   --   --   --   --  91*  --    BANDS PCT %  --   --   --  14* 23*  --   --   --   --   --   --   --    MONOS PCT %  --   --   --   --   --   --   --   --   --   --  3*  --    MONO PCT %  --   --   --  2* 3*  --   --   --   --   --   --  7    < > = values in this interval not displayed  Results from last 7 days   Lab Units 01/14/21  0408 01/13/21  2328 01/13/21  0840 01/13/21  0440 01/13/21  0027 01/12/21  2042 01/12/21  1718  01/11/21  1422   SODIUM mmol/L 147* 145 148* 151*  151* 153* 154* 154*   < > 150*   POTASSIUM mmol/L 4 4 5 7* 4 3 4 6  4 6 4 7 5 0 5 0   < > 3 3*   CHLORIDE mmol/L 121* 121* 121* 122*  120* 124* 123* 124*   < > 111*   CO2 mmol/L 24 24 20* 21  21 23 22 19*   < > 10*   CO2, I-STAT   --   --   --   --   --   --   --    < >  --    ANION GAP mmol/L 2* 0* 7 8  10 6 9 11   < > 29*   BUN mg/dL 22 23 28* 29*  30* 30* 32* 36*   < > 64*   CREATININE mg/dL 0 69 0 64 0 67 0 83  0 75 0 78 0 84 1 03   < > 1 43*   CALCIUM mg/dL 7 1* 6 8* 6 9* 7 0*  7 2* 7 1* 7 1* 7 2*   < > 9 2   GLUCOSE RANDOM mg/dL 160* 231* 174* 172*  172* 222* 199* 260*   < > 550*   ALT U/L  --   --   --  33  --   --   --   --  25   AST U/L  --   --   --  77*  --   --   --   --  29   ALK PHOS U/L  --   --   --  77  --   --   --   --  114   ALBUMIN g/dL  --   --   --  1 4*  --   --   --   --  2 9*   TOTAL BILIRUBIN mg/dL  --   --   --  0 50  --   --   --   --  0 70    < > = values in this interval not displayed       Results from last 7 days   Lab Units 01/14/21  0408 01/13/21  0840 01/13/21  0440 01/13/21  0027 01/12/21 2042 01/12/21  1718 01/12/21  1301   MAGNESIUM mg/dL 2 5 2 1 2 2 2 3 2 4 2 4 2 4   PHOSPHORUS mg/dL 2 4 2 1* 2 6 2 0* 2 2* 2 6 4 4*      Results from last 7 days   Lab Units 01/14/21  0421 01/13/21  1746 01/13/21  0840 01/13/21  0027 01/12/21  1831 01/12/21  0327 01/11/21  1020   INR   --   --   --   --  1 70* 1 52* 1 38*   PTT seconds 168* 113* 39* >210* 34 29 26      Results from last 7 days   Lab Units 01/11/21  1020   TROPONIN I ng/mL <0 03     Results from last 7 days   Lab Units 01/13/21  2328 01/13/21  1205 01/12/21  1301 01/12/21  1008 01/12/21  0322 01/12/21  0206 01/11/21  2303   LACTIC ACID mmol/L 1 4 1 7 2 4* 2 8* 4 6* 3 3* 4 9*     ABG:  Results from last 7 days   Lab Units 01/13/21  1746   PH ART  7 465*   PCO2 ART mm Hg 22 4*   PO2 ART mm Hg 109 7   HCO3 ART mmol/L 15 8*   BASE EXC ART mmol/L -6 7   ABG SOURCE  Line, Arterial     VBG:  Results from last 7 days   Lab Units 01/13/21  1746  01/12/21  1407   PH REAL   --   --  7 322   PCO2 REAL mm Hg  --   --  33 3*   PO2 REAL mm Hg  --   --  70 6*   HCO3 REAL mmol/L  --   --  16 8*   BASE EXC REAL mmol/L  --   --  -8 3   ABG SOURCE  Line, Arterial   < >  --     < > = values in this interval not displayed  Results from last 7 days   Lab Units 01/14/21  0408 01/13/21  0440 01/12/21  0638 01/11/21  1151   PROCALCITONIN ng/ml 1 13* 1 64* 0 65* 0 31*       Micro  Results from last 7 days   Lab Units 01/12/21  0640 01/12/21  0206 01/11/21  1937 01/11/21  1926 01/11/21  1020   BLOOD CULTURE   --   --   --  No Growth at 48 hrs  No Growth at 48 hrs  No Growth at 48 hrs  No Growth at 48 hrs  URINE CULTURE  No Growth <1000 cfu/mL  --   --   --   --    MRSA CULTURE ONLY   --   --  No Methicillin Resistant Staphlyococcus aureus (MRSA) isolated  --   --    LEGIONELLA URINARY ANTIGEN   --  Negative  --   --   --    STREP PNEUMONIAE ANTIGEN, URINE   --  Negative  --   --   --        EKG:   Imaging: CXR I have personally reviewed pertinent reports        Intake and Output  I/O 01/12 0701 - 01/13 0700 01/13 0701 - 01/14 0700    I V  (mL/kg)  608 6 (7 2)    IV Piggyback  1250    Total Intake(mL/kg)  1858 6 (21 8)    Urine (mL/kg/hr)  2015    Blood  200    Total Output  2215    Net  -356 4              UOP: 83 ml/hr     Height and Weights   Height: 5' 6" (167 6 cm)  IBW: 63 8 kg  Body mass index is 30 28 kg/m²  Weight (last 2 days)     Date/Time   Weight    01/13/21 1600   85 1 (187 61)                Nutrition       Diet Orders   (From admission, onward)             Start     Ordered    01/13/21 1432  Diet NPO  Diet effective now     Question Answer Comment   Diet Type NPO    RD to adjust diet per protocol?  Yes        01/13/21 1431    01/13/21 1432  Room Service  Once     Question:  Type of Service  Answer:  Room Service- Not Appropriate    01/13/21 1431                    Active Medications  Scheduled Meds:  Current Facility-Administered Medications   Medication Dose Route Frequency Provider Last Rate    ascorbic acid  1,000 mg Oral Q12H [address] Shane Paco, PA-C      atorvastatin  40 mg Oral HS Shane Paco, PA-C      cefepime  2,000 mg Intravenous Q12H Shane Paco, PA-C      chlorhexidine  15 mL Swish & Spit Q12H [address] Shane Paco, PA-C      cholecalciferol  2,000 Units Oral Daily Shane Paco, PA-C      dexamethasone  0 1 mg/kg Intravenous Q12H Shane Paco, PA-C      dexmedetomidine  0 1-0 7 mcg/kg/hr Intravenous Titrated Shane Paco, PA-C 0 7 mcg/kg/hr (01/14/21 1061)    fentaNYL  100 mcg/hr Intravenous Continuous Shane Paco, PA-C 100 mcg/hr (01/13/21 2155)    fentanyl citrate (PF)  50 mcg Intravenous Q2H PRN Shane Paco, PA-C      heparin (porcine)  3-30 Units/kg/hr (Order-Specific) Intravenous Titrated Shane Paco, PA-C 15 Units/kg/hr (01/14/21 0534)    heparin (porcine)  2,800 Units Intravenous Q1H PRN Shane Paco, PA-C      heparin (porcine)  5,600 Units Intravenous Q1H PRN Shane Antonio PA-C      insulin regular (HumuLIN R,NovoLIN R) infusion  0 3-21 Units/hr Intravenous Titrated Noelle Conch, PA-C 3 Units/hr (01/14/21 9772)    levalbuterol  1 25 mg Nebulization Q6H Noelle Conch, PA-C      multi-electrolyte  1,000 mL Intravenous Once Eben Bustillo MD      multi-electrolyte  1,000 mL Intravenous Once Noelle Conch, PA-C      multi-electrolyte  150 mL/hr Intravenous Continuous Noelle Conch, PA-C 150 mL/hr (01/14/21 1608)    zinc sulfate  220 mg Oral Daily Noelle Conch, PA-C      Followed by   Joaquim Primes ON 1/19/2021] multivitamin-minerals  1 tablet Oral Daily Noelle Conch, PA-C      norepinephrine  1-30 mcg/min Intravenous Titrated Noelle Conch, PA-C      pantoprozole (PROTONIX) infusion (Continuous)  8 mg/hr Intravenous Continuous Noelle Conch, PA-C 8 mg/hr (01/13/21 1941)    sodium chloride (PF)  3 mL Intravenous Q1H PRN Noelle Conch, PA-C      sodium chloride  4 mL Nebulization Q6H Noelle Conch, PA-C      vancomycin  20 mg/kg (Adjusted) Intravenous Q12H Noelle Conch, PA-C       Continuous Infusions:  dexmedetomidine, 0 1-0 7 mcg/kg/hr, Last Rate: 0 7 mcg/kg/hr (01/14/21 0208)  fentaNYL, 100 mcg/hr, Last Rate: 100 mcg/hr (01/13/21 2155)  heparin (porcine), 3-30 Units/kg/hr (Order-Specific), Last Rate: 15 Units/kg/hr (01/14/21 0534)  insulin regular (HumuLIN R,NovoLIN R) infusion, 0 3-21 Units/hr, Last Rate: 3 Units/hr (01/14/21 0609)  multi-electrolyte, 150 mL/hr, Last Rate: 150 mL/hr (01/14/21 0634)  norepinephrine, 1-30 mcg/min  pantoprozole (PROTONIX) infusion (Continuous), 8 mg/hr, Last Rate: 8 mg/hr (01/13/21 1941)      PRN Meds:   fentanyl citrate (PF), 50 mcg, Q2H PRN  heparin (porcine), 2,800 Units, Q1H PRN  heparin (porcine), 5,600 Units, Q1H PRN  sodium chloride (PF), 3 mL, Q1H PRN        Allergies   Allergies   Allergen Reactions    Varenicline ---------------------------------------------------------------------------------------  Advance Directive and Living Will:      Power of :    POLST:    ---------------------------------------------------------------------------------------  Care Time Delivered:   No Critical Care time spent     Martha Magaña MD      Portions of the record may have been created with voice recognition software  Occasional wrong word or "sound a like" substitutions may have occurred due to the inherent limitations of voice recognition software    Read the chart carefully and recognize, using context, where substitutions have occurred

## 2021-01-14 NOTE — WOUND OSTOMY CARE
Consult Note - Wound   Vignesh Kelly 56 y o  male MRN: [id number]  Unit/Bed#: OR POOL Encounter: [id number]      History and Present Illness:  Patient is a 56year old male who is a transfer from [facility] where  He presented with complaints of nausea, SOB and alter mental status  He was found COVID (+) and in DKA, transferred to [place] initially  Where he developed hematemesis and transferred to [place]  He has hematuric urine too, patient admitted with ischemic LE as well  He is intubated, sedated on alternating air mattress with ongoing turn/reposition, he has Allevyn foam in place, had a rogers catheter in place, no stool incontinence noted  BMI: Estimated body mass index is 30 28 kg/m² as calculated from the following:    Height as of this encounter: 5' 6" (1 676 m)  Weight as of this encounter: 85 1 kg (187 lb 9 8 oz)      History  Past Medical History:   Diagnosis Date    Diabetes mellitus ([place] [address] )     GERD (gastroesophageal reflux disease)     Hypercholesteremia     Hypertension     Methadone use ([place] [address] )      Past Surgical History:   Procedure Laterality Date    ARTERIOGRAM Right 1/13/2021    Procedure: ARTERIOGRAM;  Surgeon: Cheyenne Russell DO;  [facility]: [facility];  Service: Vascular    THROMBECTOMY W/ EMBOLECTOMY Right 1/13/2021    Procedure: EMBOLECTOMY/THROMBECTOMY LOWER EXTREMITY;  Surgeon: Cheyenne Russell DO;  Location[facility];  Service: Vascular       Problem List:   Patient Active Problem List   Diagnosis    Chronic hepatitis C without hepatic coma ([place] [address] )    Essential hypertension    Hyperlipidemia associated with type 2 diabetes mellitus ([place] [address] )    Methadone maintenance therapy patient ([place] [address] )    Type 2 diabetes mellitus with diabetic polyneuropathy ([place] [address] )    Callous ulcer, limited to breakdown of skin ([place] [address] )    Bilateral lower extremity edema    BMI 28 0-28 9,adult    Positive depression screening    Mononeuropathy, unspecified    Other and unspecified noninfectious gastroenteritis and colitis    Mixed hyperlipidemia    Proteinuria    Tobacco abuse counseling    Vitamin D deficiency    SOB (shortness of breath) on exertion    Diabetic ketoacidosis without coma associated with type 2 diabetes mellitus ([facility] )    Acute metabolic encephalopathy    Acute kidney injury ([facility] )    Acute respiratory failure with hypoxia ([facility] )    Sepsis due to COVID-19 ([facility] )    Hypernatremia    Pneumonia due to COVID-19 virus    Metabolic acidosis, increased anion gap    Acute pancreatitis    Hematuria    Aortic thrombus ([facility])       Medications:  Current Facility-Administered Medications   Medication Dose Route Frequency Provider Last Rate Last Admin    ascorbic acid (VITAMIN C) tablet 1,000 mg  1,000 mg Oral Q12H [facility] Marce Lux PA-C   1,000 mg at 01/14/21 0805    atorvastatin (LIPITOR) tablet 40 mg  40 mg Oral HS Marce Lux PA-C        [MAR Hold] cefepime (MAXIPIME) 2,000 mg in dextrose 5 % 50 mL IVPB  2,000 mg Intravenous Q12H Marce Lux PA-C 100 mL/hr at 01/14/21 1038 2,000 mg at 01/14/21 1038    chlorhexidine (PERIDEX) 0 12 % oral rinse 15 mL  15 mL Swish & Spit Q12H [facility] Rosa Hawkins PA-C   15 mL at 01/14/21 0805    cholecalciferol (VITAMIN D3) tablet 2,000 Units  2,000 Units Oral Daily Rosa Hawkins PA-C   2,000 Units at 01/14/21 1038    dexamethasone (PF) (DECADRON) injection 7 3 mg  0 1 mg/kg Intravenous Q12H Marce Lux PA-C   7 3 mg at 01/14/21 0727    dexmedetomidine (PRECEDEX) 400 mcg in sodium chloride 0 9 % 100 mL infusion  0 1-0 7 mcg/kg/hr Intravenous Titrated Rosa aHwkins PA-C 12 8 mL/hr at 01/14/21 1305 0 7 mcg/kg/hr at 01/14/21 1305    fentaNYL 1000 mcg in sodium chloride 0 9% 100mL infusion  100 mcg/hr Intravenous Continuous Marce Lux PA-C 10 mL/hr at 01/14/21 1305 100 mcg/hr at 01/14/21 1305    fentanyl citrate (PF) 100 MCG/2ML 50 mcg  50 mcg Intravenous Q2H PRN Rosa Hawkins PA-C   50 mcg at 01/14/21 1025    heparin (porcine) 25,000 units in 0 45% NaCl 250 mL infusion (premix)  3-30 Units/kg/hr (Order-Specific) Intravenous Titrated Parks Rather, PA-C   Stopped at 01/14/21 1148    heparin (porcine) injection 2,800 Units  2,800 Units Intravenous Q1H PRN Parks Rather, PA-C        heparin (porcine) injection 5,600 Units  5,600 Units Intravenous Q1H PRN Parks Rather, PA-C        insulin regular (HumuLIN R,NovoLIN R) 1 Units/mL in sodium chloride 0 9 % 100 mL infusion  0 3-21 Units/hr Intravenous Titrated Parks Rather, PA-C 3 mL/hr at 01/14/21 1305 3 Units/hr at 01/14/21 1305    levalbuterol (XOPENEX) inhalation solution 1 25 mg  1 25 mg Nebulization Q6H Marce Lux PA-C   1 25 mg at 01/14/21 0827    [MAR Hold] multi-electrolyte (ISOLYTE-S PH 7 4) bolus 1,000 mL  1,000 mL Intravenous Once Sonia Morales MD        multi-electrolyte (PLASMALYTE-A/ISOLYTE-S PH 7 4) IV solution  150 mL/hr Intravenous Continuous Chance Mary PA-C 150 mL/hr at 01/14/21 0634 150 mL/hr at 01/14/21 0382    zinc sulfate (ZINCATE) capsule 220 mg  220 mg Oral Daily Parks Rather, PA-C   220 mg at 01/14/21 1038    Followed by   Terry Turner ON 1/19/2021] multivitamin-minerals (CENTRUM ADULTS) tablet 1 tablet  1 tablet Oral Daily Parks Rather, PA-C        norepinephrine (LEVOPHED) 4 mg (STANDARD CONCENTRATION) IV in sodium chloride 0 9% 250 mL  1-30 mcg/min Intravenous Titrated Parks Rather, PA-C        pantoprazole (PROTONIX) 80 mg in sodium chloride 0 9 % 100 mL infusion  8 mg/hr Intravenous Continuous Chance Rather, PA-C 10 mL/hr at 01/14/21 1138 8 mg/hr at 01/14/21 1138    propofol (DIPRIVAN) 1000 mg in 100 mL infusion (premix)  5-50 mcg/kg/min Intravenous Titrated Kranthi Salt PA-C 10 2 mL/hr at 01/14/21 1305 20 mcg/kg/min at 01/14/21 1305    sodium chloride (PF) 0 9 % injection 3 mL  3 mL Intravenous Q1H PRN Chance Hernandez, JOSE        sodium chloride 3 % inhalation solution 4 mL  4 mL Nebulization Q6H Marce Lux PA-C   4 mL at 01/14/21 0827    [MAR Hold] vancomycin (VANCOCIN) 1,500 mg in sodium chloride 0 9 % 250 mL IVPB  20 mg/kg (Adjusted) Intravenous Q12H Amanda Mcghee PA-C   Stopped at 01/14/21 0900     Facility-Administered Medications Ordered in Other Encounters   Medication Dose Route Frequency Provider Last Rate Last Admin    fentanyl citrate (PF) 100 MCG/2ML   Intravenous PRN Barr Boor, CRNA   100 mcg at 01/14/21 1348    ketamine (KETALAR)    Continuous PRN Barr Boor, CRNA 34 04 mL/hr at 01/14/21 1325 0 4 mg/kg/hr at 01/14/21 1325    Ketamine HCl   Intravenous PRN Barr Boor, CRNA   50 mg at 01/14/21 1303    phenylephrine bolus from bag   Intravenous PRN Barr Boor, CRNA   100 mcg at 01/14/21 1424    ROCuronium (ZEMURON) injection   Intravenous PRN Barr Boor, CRNA   50 mg at 01/14/21 1427    sodium chloride 0 9 % infusion   Intravenous Continuous PRN Barr Boor, CRNA   New Bag at 01/14/21 1310         Assessment Findings:   1-Sacrum with POA DTI  Wound presenting as Deep non blanchable purple discoloration, partially blistering  2-RLE with ischemic changes and blisters  Most blisters are intact newer blisters still developing distally  No other wounds or skin issues seen, R great toe medially with callous  Wound/Skin Care:  1-Cleanse sacrum with soap water, place xeroform to sacral DTI, cover with Allevyn foam change every three days  Check skin integrigy daily  2-Turn/rreposition q2h for pressure re-distribution on skin  3-Elevate heels to offload pressure  4-Ehob cushion in chair when out of bed  5-Moisture skin daily with skin nourishing  6-Hydgraguard to bilateal heels BID  Vitals: Blood pressure 137/62, pulse 92, temperature 100 4 °F (38 °C), temperature source Esophageal, resp  rate (!) 38, height 5' 6" (1 676 m), weight 85 1 kg (187 lb 9 8 oz), SpO2 99 %  ,Body mass index is 30 28 kg/m²  Wound 01/12/21 Pressure Injury Sacrum Mid (Active)   Wound Image    01/14/21 0915   Wound Description Light purple 01/14/21 0915   Pressure Injury Stage DTPI 01/14/21 0915   Renuka-wound Assessment Intact 01/14/21 0915   Wound Length (cm) 10 cm 01/14/21 0915   Wound Width (cm) 9 cm 01/14/21 0915   Wound Surface Area (cm^2) 90 cm^2 01/14/21 0915   Drainage Amount None 01/14/21 0915   Treatments Site care 01/14/21 0915   Dressing Foam, Silicon (eg  Allevyn, etc) 01/14/21 0915   Patient Tolerance Tolerated well 01/14/21 0915       Wound 01/13/21 Leg Right (Active)   Wound Image     01/14/21 0913   Wound Description MARIO 01/14/21 0758   Renuka-wound Assessment MARIO 01/14/21 0758   Wound Site Closure Staples 01/14/21 0758   Dressing Non adherent 01/14/21 0758       Wound 01/13/21 Groin Bilateral (Active)   Wound Description MARIO 01/14/21 0758   Renuka-wound Assessment MARIO 01/14/21 0758   Wound Site Closure Staples 01/14/21 0758   Dressing Non adherent 01/14/21 0758       Wound 01/14/21 Thigh Right (Active)         Our skin care recommendations are placed as nursing orders, wound care to continue following, please tiger text team with questions and concerns          Lilian Gaona, RN, BSN, [facility]

## 2021-01-14 NOTE — QUICK NOTE
I called the patient's contact to provide an update  A man answered and, when I asked who had answered the phone, an individual stated a different name than the listed contact  They immediately went on to explain that they had tried calling about the patient and that they are receiving updates as the patient's contact is in the ER with COVID  No other contacts are listed in the medical record  The individual was thanked for their time and told that the number will be called again at a later time  The patient's name was not mentioned by myself during the phone conversation  16:57 Update: I attempted to call the number again  The same individual, Scott Cruz, answered  He explained that the patient's contact was not able to answer the phone at this time  He asked about the patient and I stated that I am not able to state whether or not any individual is in the hospital  He began swearing and yelling through the phone  He was thanked for his time and told that future calls will be made  5:12 Update: I was updated by nursing that an individual stating that she is the sister of the patient called  I answered the phone, introduced myself, and asked who it was  The person's name, Marilyn Orourke, was not the only listed contact of the patient, Fredtammydhruvmichele Vallejo  The individual asked about the patient and I again stated that I cannot state whether or not any individual is in the hospital  Andreina Licona was on the line immediate began yelling  They were thanked for their time and the call was ended       Case management consult placed

## 2021-01-14 NOTE — OP NOTE
OPERATIVE REPORT  PATIENT NAME: Debo Eddy    :  1960  MRN: [id number]  Pt Location: [facility]    SURGERY DATE: 2021    Surgeon(s) and Role:     * Andres Galicia DO - Primary     * Shae Monaco DO - Assisting    Preop Diagnosis:  * No pre-op diagnosis entered *    * No Diagnosis Codes entered *    Procedure(s) (LRB):  EMBOLECTOMY/THROMBECTOMY LOWER EXTREMITY (Right)  ARTERIOGRAM (Right)    Specimen(s):  ID Type Source Tests Collected by Time Destination   1 : right emboli Tissue Thrombus/Embolus TISSUE EXAM Andres Galicia DO 2021        Estimated Blood Loss:   200 mL    Drains:  NG/OG/Enteral Tube Orogastric 16 Fr (Active)   Placement Reverification Auscultation 21 1530   Site Assessment Clean;Dry; Intact 21 1530   Status Suction-low continuous 21 1530   Drainage Appearance Ted Daniel; Thin 21 1530   Intake (mL) 90 mL 21 0800   Output (mL) 0 mL 21 1200   Number of days: 2       Urethral Catheter 18 Fr  (Active)   Reasons to continue Urinary Catheter  Accurate I&O assessment in critically ill patients (48 hr  max) 21 1530   Goal for Removal Other (Comment) 21 1530   Site Assessment Clean;Skin intact 21 1530   Collection Container Standard drainage bag 21 1530   Securement Method Securing device (Describe) 21 1530   Output (mL) 225 mL 21 1901   Number of days:        Anesthesia Type:   * No anesthesia type entered *    Operative Indications:  Right Lower Extremity Acute Limb Ischemia (Cold Pulseless Leg, Unable to Assess Motor/Sensory Function)    Operative Findings:  Extensive thrombus within SFA, PFA, Pop, and Tibial    Complications:   None    Procedure and Technique: On the date of the procedure, the patient was taken to the operating room, transferred to the OR table and positioned supine  The patient was positioned appropriately and prepped and draped in the normal sterile fashion      A time out procedure was performed identifying and confirming the correct patient and procedure  A longitudinal incision was made in the right groin directly overlying an easily palpable femoral pulse  The incision was carried down through the subcutaneous tissue until the artery was identified, dissected and controlled proximally and distally with vessel loops  A transverse arteriotomy was made above the femoral bifurcation, there was strong, pulsatile inflow however poor backbleeding from the SFA and PFA  A #3 edi catheter was advanced into the SFA and PFA until no more thrombus was extracted and there was backbleeding  The arteriotomy was then closed with 5-0 prolene  A micro catheter was introduced into the CFA and diagnostic angiogram was performed which revealed popliteal occlusion  At this time a longitudial incision was made below the knee on the medial aspect of the leg  The incision was carried down through the subcutaneous tissue until the gastrocnemius fascia was encountered, this was opened and the gastrocnemius muscle was bluntly dissected off the tibia exposing the popliteal artery and vein which were individually dissected and controlled with vessel loops  A transverse arteriotomy was made in the popliteal artery and a #4 edi catheter was passed into the SFA until no more thrombus was able to be extracted and there was pulsatile bleeding  Next a #3 edi was passed into the tibial vessels and used the extract thrombus  After multiple passes no more thrombus was extracted however there was minimal backbleeding  The transverse arteriotomy was closed with 6-0 prolene  Attention was then turned toward closure of the surgical wounds  This was accomplished with 2-0 and 3-0 monocryl as well as surgical skin staples  Sterile dressings were applied      At the end of the case there was a palpable pulse in the femoral and popliteal artery and a doppler signal in the above ankle AT   Unfortunately there was already blistering of the skin and given the patient's degree of critical illness a motor/senosory exam could not be obtained      Dr Karissa Bañuelos was present for the entire procedure    Patient Disposition:  Critical Care Unit    SIGNATURE: Jessica Ambrosio DO  DATE: January 14, 2021  TIME: 12:01 AM

## 2021-01-14 NOTE — QUICK NOTE
Vascular Surgery Post-Op Assessment  Leonia Harada 56 y o  male MRN: [id number]  Unit/Bed#: MICU 11 Encounter: [id number]     S: N/A (intubated/sedated)    O:   Vitals:    01/14/21 1200   BP:    Pulse: 92   Resp: (!) 38   Temp: 100 4 °F (38 °C)   SpO2: 99%     I/O       01/12 0701 - 01/13 0700 01/13 0701 - 01/14 0700 01/14 0701 - 01/15 0700    I V  (mL/kg)  806 (9 5) 1775 (20 9)    NG/GT   30    IV Piggyback  1250 261 2    Total Intake(mL/kg)  2056 (24 2) 2066 2 (24 3)    Urine (mL/kg/hr)  2015 1025 (1 5)    Blood  200     Total Output  2215 1025    Net  -159 +1041 2               PE:  Gen:  Ill-appearing  HENT: ETT/OGT in place  CV:  Tachycardic, regular rhythm  Lung:  Mechanically ventilated  Abd:  Soft, non-distended  : Veras in place, cola-colored urine   Groin dressings C/D/I   Ext:  Anasarca present   R AKA stump dressing C/D/I   Skin: Scattered ecchymoses  Neuro:  Sedated     Lab Results   Component Value Date    WBC 10 50 (H) 01/14/2021    HGB 8 3 (L) 01/14/2021    HCT 25 2 (L) 01/14/2021    MCV 88 01/14/2021    PLT 38 (LL) 01/14/2021     Lab Results   Component Value Date    GLUCOSE 132 01/12/2021    CALCIUM 7 1 (L) 01/14/2021    K 4 4 01/14/2021    CO2 24 01/14/2021     (H) 01/14/2021    BUN 22 01/14/2021    CREATININE 0 69 01/14/2021         A/P: 56 y o  male COVID+ with Casey class III ischemia of RLE and resultant rhabdomyolysis, now POD#0 s/p urgent R guillotine AKA   Do not remove R guillotine AKA stump dressing   Will tentatively plan for R AKA formalization in OR on Monday 1/18/21 as long as patient is relatively stable      Steve Husain MD  PGY2, General Surgery

## 2021-01-14 NOTE — PROGRESS NOTES
Progress Note - Vascular Surgery   Leonia Harada 56 y o  male MRN: [id number]  [facility]: [id number]    Assessment:  56year old male with COVID-19, DVT/PE, HepC and Embolic Event to RLE causing Earlham III Acute Limb Ischemia, due to patients status we were unable to determine duration of ischemia and as such did attempt surgical revascularizartion, unfortunately it is obvious the leg is not and was not salvageable despite aggressive surgical revascularization     Plan:  -patient will require Right AKA, currently this does not need to be done emergently  -medical mgmt per ICU  -continue heparin drip  -Guarded Prognosis        Subjective:    Patient seen and examined    Overnight patient was taken to operating room for right lower extremity acute limb ischemia secondary to embolic event, however unknown time of event / duration of ischemia  Right nsgdadr-ibcgahydw-ylprzu embolectomy was performed however by the end of the case the patient's leg had begun to blister, consistent with Earlham III ALI    Since OR he has remained intubated and on heparin drip, the leg has developed more skin mottling/blistering      Vitals:  /62   Pulse 82   Temp 98 6 °F (37 °C)   Resp (!) 35   Ht 5' 6" (1 676 m)   Wt 85 1 kg (187 lb 9 8 oz)   SpO2 98%   BMI 30 28 kg/m²     I/Os:  I/O last 3 completed shifts: In: 399 9 [I V :149 9;  IV Piggyback:250]  Out: 400 [Urine:400]  I/O this shift:  In: 1458 7 [I V :458 7; IV Piggyback:1000]  Out: 8265 [Urine:1405; Blood:200]    Lab Results and Cultures:   CBC with diff:   Lab Results   Component Value Date    WBC 10 45 (H) 01/14/2021    HGB 9 0 (L) 01/14/2021    HCT 28 2 (L) 01/14/2021    MCV 87 01/14/2021    PLT 39 (LL) 01/14/2021    MCH 27 9 01/14/2021    MCHC 31 9 01/14/2021    RDW 14 3 01/14/2021    MPV 12 2 01/14/2021    NRBC 0 01/14/2021   ,   BMP/CMP:  Lab Results   Component Value Date    SODIUM 147 (H) 01/14/2021    K 4 4 01/14/2021     (H) 01/14/2021    CO2 24 01/14/2021    CO2 20 (L) 01/12/2021    BUN 22 01/14/2021    CREATININE 0 69 01/14/2021    GLUCOSE 132 01/12/2021    CALCIUM 7 1 (L) 01/14/2021    AST 77 (H) 01/13/2021    ALT 33 01/13/2021    ALKPHOS 77 01/13/2021    EGFR 102 01/14/2021   ,   Lipid Panel: No results found for: CHOL,   Coags:   Lab Results   Component Value Date     (HH) 01/14/2021    INR 1 70 (H) 01/12/2021   ,     Blood Culture:   Lab Results   Component Value Date    BLOODCX No Growth at 48 hrs  01/11/2021    BLOODCX No Growth at 48 hrs  01/11/2021   ,   Urinalysis:   Lab Results   Component Value Date    COLORU Nelsy 01/12/2021    CLARITYU Cloudy 01/12/2021    SPECGRAV 1 025 01/12/2021    PHUR 6 0 01/12/2021    PHUR 5 5 02/19/2019    LEUKOCYTESUR Trace (A) 01/12/2021    NITRITE Negative 01/12/2021    GLUCOSEU Negative 01/12/2021    KETONESU Negative 01/12/2021    BILIRUBINUR Negative 01/12/2021    BLOODU Large (A) 01/12/2021   ,   Urine Culture:   Lab Results   Component Value Date    URINECX No Growth <1000 cfu/mL 01/12/2021   ,   Wound Culure: No results found for: WOUNDCULT    Medications:  Current Facility-Administered Medications   Medication Dose Route Frequency    ascorbic acid (VITAMIN C) tablet 1,000 mg  1,000 mg Oral Q12H [address]    atorvastatin (LIPITOR) tablet 40 mg  40 mg Oral HS    cefepime (MAXIPIME) 2,000 mg in dextrose 5 % 50 mL IVPB  2,000 mg Intravenous Q12H    chlorhexidine (PERIDEX) 0 12 % oral rinse 15 mL  15 mL Swish & Spit Q12H [address]    cholecalciferol (VITAMIN D3) tablet 2,000 Units  2,000 Units Oral Daily    dexamethasone (PF) (DECADRON) injection 7 3 mg  0 1 mg/kg Intravenous Q12H    dexmedetomidine (PRECEDEX) 400 mcg in sodium chloride 0 9 % 100 mL infusion  0 1-0 7 mcg/kg/hr Intravenous Titrated    fentaNYL 1000 mcg in sodium chloride 0 9% 100mL infusion  100 mcg/hr Intravenous Continuous    fentanyl citrate (PF) 100 MCG/2ML 50 mcg  50 mcg Intravenous Q2H PRN    heparin (porcine) 25,000 units in 0 45% NaCl 250 mL infusion (premix)  3-30 Units/kg/hr (Order-Specific) Intravenous Titrated    heparin (porcine) injection 2,800 Units  2,800 Units Intravenous Q1H PRN    heparin (porcine) injection 5,600 Units  5,600 Units Intravenous Q1H PRN    insulin regular (HumuLIN R,NovoLIN R) 1 Units/mL in sodium chloride 0 9 % 100 mL infusion  0 3-21 Units/hr Intravenous Titrated    levalbuterol (XOPENEX) inhalation solution 1 25 mg  1 25 mg Nebulization Q6H    multi-electrolyte (ISOLYTE-S PH 7 4) bolus 1,000 mL  1,000 mL Intravenous Once    multi-electrolyte (ISOLYTE-S PH 7 4) bolus 1,000 mL  1,000 mL Intravenous Once    multi-electrolyte (PLASMALYTE-A/ISOLYTE-S PH 7 4) IV solution  150 mL/hr Intravenous Continuous    zinc sulfate (ZINCATE) capsule 220 mg  220 mg Oral Daily    Followed by   Italo Francisco ON 1/19/2021] multivitamin-minerals (CENTRUM ADULTS) tablet 1 tablet  1 tablet Oral Daily    norepinephrine (LEVOPHED) 4 mg (STANDARD CONCENTRATION) IV in sodium chloride 0 9% 250 mL  1-30 mcg/min Intravenous Titrated    pantoprazole (PROTONIX) 80 mg in sodium chloride 0 9 % 100 mL infusion  8 mg/hr Intravenous Continuous    sodium chloride (PF) 0 9 % injection 3 mL  3 mL Intravenous Q1H PRN    sodium chloride 3 % inhalation solution 4 mL  4 mL Nebulization Q6H    vancomycin (VANCOCIN) 1,500 mg in sodium chloride 0 9 % 250 mL IVPB  20 mg/kg (Adjusted) Intravenous Q12H       Imaging:  reviewed    Physical Exam:    General: intubated/sedated, unable to assess motor/sensory function  CV: regular rate  Respiratory: on vent  Abdominal: soft  Extremities:   -RLE: dressing c/d/i, leg is non-salvageable  Neurologic: unable to assess          Marisol Bustamante DO  1/14/2021

## 2021-01-14 NOTE — ANESTHESIA POSTPROCEDURE EVALUATION
Post-Op Assessment Note    CV Status:  Stable  Pain Score: 0    Pain management: adequate     Mental Status:  Unresponsive (sedated on precedex, ppf and fent)   Hydration Status:  Euvolemic and stable   PONV Controlled:  None   Airway Patency:  Patent  Airway: intubated      Post Op Vitals Reviewed: Yes      Staff: CRNA   Comments: report to ICU staff        No complications documented      /55 (01/13/21 2309)    Temp      Pulse 75 (01/13/21 2309)   Resp 19 (01/13/21 2309)    SpO2 99 % (01/13/21 2309)

## 2021-01-14 NOTE — QUICK NOTE
Post-Op Check Note - Vascular Surgery    S: Patient is s/p R femoral embolectomy and R popliteal embolectomy with completion Agram  Patient tolerated the procedure well  He was transported to the MICU in stable condition, remains intubated  O:   Vitals:    01/13/21 1830   BP:    Pulse: 60   Resp: (!) 34   Temp:    SpO2: 100%       I/O last 3 completed shifts: In: 399 9 [I V :149 9; IV Piggyback:250]  Out: 400 [Urine:400]  I/O this shift:  In: 250 [IV Piggyback:250]  Out: 825 [Urine:625; Blood:200]    PE:  NAD  Normocephalic, atraumatic  MMM, PERRLA  Intubated   RRR  Abd soft, NT/ND  R groin incision is c/d/i   R medial lower leg incision is c/d/i  Biphasic popliteal signal  Absent signals distally  Lower leg is cool from knee distally  Unable to assess motor/sensory 2/2 intubation/sedation  Compartments are more taut than LLE, but still soft   Blisters present to anterior and medial portion of R shin  Sedated       Lab Results   Component Value Date    WBC 7 65 01/13/2021    HGB 9 1 (L) 01/13/2021    HCT 27 0 (L) 01/13/2021    MCV 85 01/13/2021    PLT 34 (LL) 01/13/2021     Lab Results   Component Value Date    GLUCOSE 132 01/12/2021    CALCIUM 6 9 (L) 01/13/2021    K 4 3 01/13/2021    CO2 20 (L) 01/13/2021     (H) 01/13/2021    BUN 28 (H) 01/13/2021    CREATININE 0 67 01/13/2021         A/P: 56 y o  M w/ PMHx of DMT2, HTN, HLD, Hep C, who presented with RLE acute limb ischemia, now s/p femoral embolectomy, popliteal embolectomy with Agram      There was extensive thrombus within the SFA, PFA, pop and tibial arteries  Patient with high likelihood of limb loss  Will continue to monitor neurovascular exam, however additional revascularization options are limited  We are likely going to need to proceed with an amputation in the coming days       - Continue heparin gtt  - frequent neurovascular checks   - remainder of care per primary team       Kathy Segal DO

## 2021-01-14 NOTE — PROGRESS NOTES
Vancomycin Assessment    Vignesh Kelly is a 56 y o  male who is currently receiving vancomycin 1500mg q12h for bacteremia     Relevant clinical data and objective history reviewed:  Creatinine   Date Value Ref Range Status   01/14/2021 0 69 0 60 - 1 30 mg/dL Final     Comment:     Standardized to IDMS reference method   01/13/2021 0 64 0 60 - 1 30 mg/dL Final     Comment:     Standardized to IDMS reference method   01/13/2021 0 67 0 60 - 1 30 mg/dL Final     Comment:     Standardized to IDMS reference method     /62   Pulse 82   Temp 98 6 °F (37 °C)   Resp (!) 35   Ht 5' 6" (1 676 m)   Wt 85 1 kg (187 lb 9 8 oz)   SpO2 98%   BMI 30 28 kg/m²   I/O last 3 completed shifts: In: 399 9 [I V :149 9; IV Piggyback:250]  Out: 400 [Urine:400]  Lab Results   Component Value Date/Time    BUN 22 01/14/2021 04:08 AM    WBC 10 45 (H) 01/14/2021 04:08 AM    HGB 9 0 (L) 01/14/2021 04:08 AM    HCT 28 2 (L) 01/14/2021 04:08 AM    MCV 87 01/14/2021 04:08 AM    PLT 39 (LL) 01/14/2021 04:08 AM     Temp Readings from Last 3 Encounters:   01/14/21 98 6 °F (37 °C)   01/13/21 99 3 °F (37 4 °C) (Axillary)   01/11/21 (!) 95 4 °F (35 2 °C) (Tympanic)     Vancomycin Days of Therapy: 1    Assessment/Plan  The patient is currently on vancomycin utilizing scheduled dosing based on adjusted body weight (due to obesity)  Baseline risks associated with therapy include: concomitant nephrotoxic medications, advanced age, and dehydration  The patient is currently receiving 1500mg q12h and is clinically appropriate and dose will be continued  Pharmacy will also follow closely for s/sx of nephrotoxicity, infusion reactions, and appropriateness of therapy  BMP and CBC will be ordered per protocol  Plan for trough as patient approaches steady state, prior to the 4th  dose at approximately [email or web address]  Due to infection severity, will target a trough of 15-20 (appropriate for most indications)     Pharmacy will continue to follow the patients culture results and clinical progress daily      Germain Angeles, Pharmacist

## 2021-01-14 NOTE — PHYSICAL THERAPY NOTE
PT orders received  Chart reviewed  PT currently intubated/sedated and not appropriate for PT at this time  Will continue to follow   Pt also pending possible HUSSEIN Hernandez, Pt, DPT     01/14/21 0738   PT Last Visit   PT Visit Date 01/14/21   Note Type   Note type Evaluation   Cancel Reasons Intubated/sedated

## 2021-01-14 NOTE — OCCUPATIONAL THERAPY NOTE
Occupational Therapy Cancellation Note       01/14/21 0740   Note Type   Note type Evaluation   Cancel Reasons Intubated/sedated       Received orders + reviewed chart  Spoke with nursing  Patient currently intubated and not appropriate for skilled OT interventions at this time  Also pending AKA  OT will continue to follow and complete initial eval as appropriate      Anthony Luong MS, OTR/L

## 2021-01-15 ENCOUNTER — APPOINTMENT (INPATIENT)
Dept: RADIOLOGY | Facility: HOSPITAL | Age: 61
DRG: 853 | End: 2021-01-15
Payer: COMMERCIAL

## 2021-01-15 LAB
ABO GROUP BLD BPU: NORMAL
ABO GROUP BLD: NORMAL
ANION GAP SERPL CALCULATED.3IONS-SCNC: 5 MMOL/L (ref 4–13)
ANION GAP SERPL CALCULATED.3IONS-SCNC: 6 MMOL/L (ref 4–13)
ANION GAP SERPL CALCULATED.3IONS-SCNC: 7 MMOL/L (ref 4–13)
ANION GAP SERPL CALCULATED.3IONS-SCNC: 7 MMOL/L (ref 4–13)
APTT PPP: 118 SECONDS (ref 23–37)
APTT PPP: 37 SECONDS (ref 23–37)
APTT PPP: 59 SECONDS (ref 23–37)
APTT PPP: 61 SECONDS (ref 23–37)
BASE EXCESS BLDA CALC-SCNC: -1.3 MMOL/L
BASE EXCESS BLDA CALC-SCNC: -1.8 MMOL/L
BASE EXCESS BLDA CALC-SCNC: -2 MMOL/L
BASE EXCESS BLDA CALC-SCNC: -2.2 MMOL/L
BASE EXCESS BLDA CALC-SCNC: -2.3 MMOL/L
BASE EXCESS BLDA CALC-SCNC: -3 MMOL/L (ref -2–3)
BASE EXCESS BLDA CALC-SCNC: -3.5 MMOL/L
BASE EXCESS BLDA CALC-SCNC: -5 MMOL/L (ref -2–3)
BASE EXCESS BLDA CALC-SCNC: -5 MMOL/L (ref -2–3)
BASOPHILS # BLD MANUAL: 0 THOUSAND/UL (ref 0–0.1)
BASOPHILS NFR BLD AUTO: 0 % (ref 0–1)
BASOPHILS NFR MAR MANUAL: 0 % (ref 0–1)
BLD GP AB SCN SERPL QL: NEGATIVE
BODY TEMPERATURE: 98.4 DEGREES FEHRENHEIT
BPU ID: NORMAL
BUN SERPL-MCNC: 18 MG/DL (ref 5–25)
BUN SERPL-MCNC: 18 MG/DL (ref 5–25)
BUN SERPL-MCNC: 19 MG/DL (ref 5–25)
BUN SERPL-MCNC: 19 MG/DL (ref 5–25)
CA-I BLD-SCNC: 0.91 MMOL/L (ref 1.12–1.32)
CA-I BLD-SCNC: 0.98 MMOL/L (ref 1.12–1.32)
CA-I BLD-SCNC: 1.04 MMOL/L (ref 1.12–1.32)
CA-I BLD-SCNC: 1.11 MMOL/L (ref 1.12–1.32)
CA-I BLD-SCNC: 1.12 MMOL/L (ref 1.12–1.32)
CALCIUM SERPL-MCNC: 6.8 MG/DL (ref 8.3–10.1)
CALCIUM SERPL-MCNC: 6.8 MG/DL (ref 8.3–10.1)
CALCIUM SERPL-MCNC: 7.4 MG/DL (ref 8.3–10.1)
CALCIUM SERPL-MCNC: 7.5 MG/DL (ref 8.3–10.1)
CHLORIDE SERPL-SCNC: 117 MMOL/L (ref 100–108)
CHLORIDE SERPL-SCNC: 118 MMOL/L (ref 100–108)
CHLORIDE SERPL-SCNC: 120 MMOL/L (ref 100–108)
CHLORIDE SERPL-SCNC: 121 MMOL/L (ref 100–108)
CK MB SERPL-MCNC: 34.6 NG/ML (ref 0–5)
CK MB SERPL-MCNC: 60.1 NG/ML (ref 0–5)
CK MB SERPL-MCNC: <1 % (ref 0–2.5)
CK MB SERPL-MCNC: <1 % (ref 0–2.5)
CK SERPL-CCNC: 6718 U/L (ref 39–308)
CK SERPL-CCNC: 8800 U/L (ref 39–308)
CO2 SERPL-SCNC: 22 MMOL/L (ref 21–32)
CO2 SERPL-SCNC: 22 MMOL/L (ref 21–32)
CO2 SERPL-SCNC: 23 MMOL/L (ref 21–32)
CO2 SERPL-SCNC: 23 MMOL/L (ref 21–32)
CREAT SERPL-MCNC: 0.58 MG/DL (ref 0.6–1.3)
CREAT SERPL-MCNC: 0.58 MG/DL (ref 0.6–1.3)
CREAT SERPL-MCNC: 0.61 MG/DL (ref 0.6–1.3)
CREAT SERPL-MCNC: 0.62 MG/DL (ref 0.6–1.3)
EOSINOPHIL # BLD MANUAL: 0 THOUSAND/UL (ref 0–0.4)
EOSINOPHIL NFR BLD AUTO: 0 % (ref 0–6)
EOSINOPHIL NFR BLD MANUAL: 0 % (ref 0–6)
ERYTHROCYTE [DISTWIDTH] IN BLOOD BY AUTOMATED COUNT: 14.2 % (ref 11.6–15.1)
ERYTHROCYTE [DISTWIDTH] IN BLOOD BY AUTOMATED COUNT: 14.2 % (ref 11.6–15.1)
ERYTHROCYTE [DISTWIDTH] IN BLOOD BY AUTOMATED COUNT: 14.3 % (ref 11.6–15.1)
ERYTHROCYTE [DISTWIDTH] IN BLOOD BY AUTOMATED COUNT: 14.4 % (ref 11.6–15.1)
GFR SERPL CREATININE-BSD FRML MDRD: 106 ML/MIN/1.73SQ M
GFR SERPL CREATININE-BSD FRML MDRD: 107 ML/MIN/1.73SQ M
GFR SERPL CREATININE-BSD FRML MDRD: 109 ML/MIN/1.73SQ M
GFR SERPL CREATININE-BSD FRML MDRD: 109 ML/MIN/1.73SQ M
GLUCOSE SERPL-MCNC: 113 MG/DL (ref 65–140)
GLUCOSE SERPL-MCNC: 124 MG/DL (ref 65–140)
GLUCOSE SERPL-MCNC: 124 MG/DL (ref 65–140)
GLUCOSE SERPL-MCNC: 140 MG/DL (ref 65–140)
GLUCOSE SERPL-MCNC: 141 MG/DL (ref 65–140)
GLUCOSE SERPL-MCNC: 143 MG/DL (ref 65–140)
GLUCOSE SERPL-MCNC: 143 MG/DL (ref 65–140)
GLUCOSE SERPL-MCNC: 151 MG/DL (ref 65–140)
GLUCOSE SERPL-MCNC: 155 MG/DL (ref 65–140)
GLUCOSE SERPL-MCNC: 166 MG/DL (ref 65–140)
GLUCOSE SERPL-MCNC: 170 MG/DL (ref 65–140)
GLUCOSE SERPL-MCNC: 176 MG/DL (ref 65–140)
GLUCOSE SERPL-MCNC: 179 MG/DL (ref 65–140)
GLUCOSE SERPL-MCNC: 183 MG/DL (ref 65–140)
GLUCOSE SERPL-MCNC: 183 MG/DL (ref 65–140)
GLUCOSE SERPL-MCNC: 194 MG/DL (ref 65–140)
GLUCOSE SERPL-MCNC: 195 MG/DL (ref 65–140)
GLUCOSE SERPL-MCNC: 200 MG/DL (ref 65–140)
HCO3 BLDA-SCNC: 19.7 MMOL/L (ref 22–28)
HCO3 BLDA-SCNC: 19.8 MMOL/L (ref 22–28)
HCO3 BLDA-SCNC: 20.1 MMOL/L (ref 22–28)
HCO3 BLDA-SCNC: 20.3 MMOL/L (ref 22–28)
HCO3 BLDA-SCNC: 20.9 MMOL/L (ref 22–28)
HCO3 BLDA-SCNC: 21.1 MMOL/L (ref 22–28)
HCO3 BLDA-SCNC: 21.2 MMOL/L (ref 22–28)
HCO3 BLDA-SCNC: 22 MMOL/L (ref 22–28)
HCO3 BLDA-SCNC: 23.4 MMOL/L (ref 24–30)
HCT VFR BLD AUTO: 20.6 % (ref 36.5–49.3)
HCT VFR BLD AUTO: 20.6 % (ref 36.5–49.3)
HCT VFR BLD AUTO: 22.5 % (ref 36.5–49.3)
HCT VFR BLD AUTO: 23.5 % (ref 36.5–49.3)
HCT VFR BLD CALC: 24 % (ref 36.5–49.3)
HCT VFR BLD CALC: 25 % (ref 36.5–49.3)
HCT VFR BLD CALC: 25 % (ref 36.5–49.3)
HGB BLD-MCNC: 6.8 G/DL (ref 12–17)
HGB BLD-MCNC: 6.9 G/DL (ref 12–17)
HGB BLD-MCNC: 7.5 G/DL (ref 12–17)
HGB BLD-MCNC: 7.8 G/DL (ref 12–17)
HGB BLDA-MCNC: 8.2 G/DL (ref 12–17)
HGB BLDA-MCNC: 8.5 G/DL (ref 12–17)
HGB BLDA-MCNC: 8.5 G/DL (ref 12–17)
HOROWITZ INDEX BLDA+IHG-RTO: 50 MM[HG]
HOROWITZ INDEX BLDA+IHG-RTO: 50 MM[HG]
HOROWITZ INDEX BLDA+IHG-RTO: 60 MM[HG]
IMM GRANULOCYTES NFR BLD AUTO: 1 % (ref 0–2)
INR PPP: 1.44 (ref 0.84–1.19)
LYMPHOCYTES # BLD AUTO: 0.25 THOUSAND/UL (ref 0.6–4.47)
LYMPHOCYTES # BLD AUTO: 3 % (ref 14–44)
LYMPHOCYTES NFR BLD AUTO: 3 % (ref 14–44)
MAGNESIUM SERPL-MCNC: 2.1 MG/DL (ref 1.6–2.6)
MAGNESIUM SERPL-MCNC: 2.1 MG/DL (ref 1.6–2.6)
MAGNESIUM SERPL-MCNC: 2.4 MG/DL (ref 1.6–2.6)
MCH RBC QN AUTO: 28.9 PG (ref 26.8–34.3)
MCH RBC QN AUTO: 29.1 PG (ref 26.8–34.3)
MCH RBC QN AUTO: 29.4 PG (ref 26.8–34.3)
MCH RBC QN AUTO: 29.4 PG (ref 26.8–34.3)
MCHC RBC AUTO-ENTMCNC: 33 G/DL (ref 31.4–37.4)
MCHC RBC AUTO-ENTMCNC: 33.2 G/DL (ref 31.4–37.4)
MCHC RBC AUTO-ENTMCNC: 33.3 G/DL (ref 31.4–37.4)
MCHC RBC AUTO-ENTMCNC: 33.5 G/DL (ref 31.4–37.4)
MCV RBC AUTO: 87 FL (ref 82–98)
MCV RBC AUTO: 88 FL (ref 82–98)
MCV RBC AUTO: 88 FL (ref 82–98)
MCV RBC AUTO: 89 FL (ref 82–98)
MONOCYTES # BLD AUTO: 0.08 THOUSAND/UL (ref 0–1.22)
MONOCYTES NFR BLD AUTO: 6 % (ref 4–12)
MONOCYTES NFR BLD: 1 % (ref 4–12)
NEUTROPHILS # BLD MANUAL: 8.04 THOUSAND/UL (ref 1.85–7.62)
NEUTS SEG NFR BLD AUTO: 90 % (ref 43–75)
NEUTS SEG NFR BLD AUTO: 95 % (ref 43–75)
NRBC BLD AUTO-RTO: 0 /100 WBCS
O2 CT BLDA-SCNC: 10 ML/DL (ref 16–23)
O2 CT BLDA-SCNC: 10.2 ML/DL (ref 16–23)
O2 CT BLDA-SCNC: 10.7 ML/DL (ref 16–23)
O2 CT BLDA-SCNC: 10.9 ML/DL (ref 16–23)
O2 CT BLDA-SCNC: 11.1 ML/DL (ref 16–23)
O2 CT BLDA-SCNC: 9.5 ML/DL (ref 16–23)
OXYHGB MFR BLDA: 94.2 % (ref 94–97)
OXYHGB MFR BLDA: 96.1 % (ref 94–97)
OXYHGB MFR BLDA: 96.1 % (ref 94–97)
OXYHGB MFR BLDA: 96.5 % (ref 94–97)
OXYHGB MFR BLDA: 97.5 % (ref 94–97)
OXYHGB MFR BLDA: 97.7 % (ref 94–97)
PCO2 BLD: 22 MMOL/L (ref 21–32)
PCO2 BLD: 23 MMOL/L (ref 21–32)
PCO2 BLD: 25 MMOL/L (ref 21–32)
PCO2 BLD: 44.6 MM HG (ref 36–44)
PCO2 BLD: 49.9 MM HG (ref 42–50)
PCO2 BLD: 50.4 MM HG (ref 36–44)
PCO2 BLDA: 22.9 MM HG (ref 36–44)
PCO2 BLDA: 25.3 MM HG (ref 36–44)
PCO2 BLDA: 25.9 MM HG (ref 36–44)
PCO2 BLDA: 26.7 MM HG (ref 36–44)
PCO2 BLDA: 28.5 MM HG (ref 36–44)
PCO2 BLDA: 29 MM HG (ref 36–44)
PEEP RESPIRATORY: 6 CM[H2O]
PH BLD: 7.25 [PH] (ref 7.35–7.45)
PH BLD: 7.28 [PH] (ref 7.35–7.45)
PH BLD: 7.28 [PH] (ref 7.3–7.4)
PH BLDA: 7.45 [PH] (ref 7.35–7.45)
PH BLDA: 7.49 [PH] (ref 7.35–7.45)
PH BLDA: 7.51 [PH] (ref 7.35–7.45)
PH BLDA: 7.52 [PH] (ref 7.35–7.45)
PH BLDA: 7.52 [PH] (ref 7.35–7.45)
PH BLDA: 7.55 [PH] (ref 7.35–7.45)
PHOSPHATE SERPL-MCNC: 1.3 MG/DL (ref 2.3–4.1)
PHOSPHATE SERPL-MCNC: 2.2 MG/DL (ref 2.3–4.1)
PHOSPHATE SERPL-MCNC: 2.3 MG/DL (ref 2.3–4.1)
PLATELET # BLD AUTO: 40 THOUSANDS/UL (ref 149–390)
PLATELET # BLD AUTO: 55 THOUSANDS/UL (ref 149–390)
PLATELET # BLD AUTO: 58 THOUSANDS/UL (ref 149–390)
PLATELET # BLD AUTO: 75 THOUSANDS/UL (ref 149–390)
PLATELET BLD QL SMEAR: ABNORMAL
PMV BLD AUTO: 10.7 FL (ref 8.9–12.7)
PMV BLD AUTO: 12.1 FL (ref 8.9–12.7)
PMV BLD AUTO: 12.4 FL (ref 8.9–12.7)
PMV BLD AUTO: 12.4 FL (ref 8.9–12.7)
PO2 BLD: 134 MM HG (ref 75–129)
PO2 BLD: 39 MM HG (ref 35–45)
PO2 BLD: 75 MM HG (ref 75–129)
PO2 BLDA: 102.5 MM HG (ref 75–129)
PO2 BLDA: 112.9 MM HG (ref 75–129)
PO2 BLDA: 130.9 MM HG (ref 75–129)
PO2 BLDA: 72.4 MM HG (ref 75–129)
PO2 BLDA: 84.6 MM HG (ref 75–129)
PO2 BLDA: 92.6 MM HG (ref 75–129)
POTASSIUM BLD-SCNC: 4 MMOL/L (ref 3.5–5.3)
POTASSIUM BLD-SCNC: 4.1 MMOL/L (ref 3.5–5.3)
POTASSIUM BLD-SCNC: 4.6 MMOL/L (ref 3.5–5.3)
POTASSIUM SERPL-SCNC: 3.9 MMOL/L (ref 3.5–5.3)
POTASSIUM SERPL-SCNC: 4 MMOL/L (ref 3.5–5.3)
POTASSIUM SERPL-SCNC: 4.1 MMOL/L (ref 3.5–5.3)
POTASSIUM SERPL-SCNC: 4.3 MMOL/L (ref 3.5–5.3)
PRESSURE CONTROL: 10
PRESSURE CONTROL: 10
PRESSURE CONTROL: 14
PRESSURE CONTROL: 8
PRESSURE CONTROL: 8
PROTHROMBIN TIME: 17.5 SECONDS (ref 11.6–14.5)
RBC # BLD AUTO: 2.35 MILLION/UL (ref 3.88–5.62)
RBC # BLD AUTO: 2.35 MILLION/UL (ref 3.88–5.62)
RBC # BLD AUTO: 2.58 MILLION/UL (ref 3.88–5.62)
RBC # BLD AUTO: 2.65 MILLION/UL (ref 3.88–5.62)
RH BLD: POSITIVE
SAO2 % BLD FROM PO2: 66 % (ref 60–85)
SAO2 % BLD FROM PO2: 93 % (ref 60–85)
SAO2 % BLD FROM PO2: 98 % (ref 60–85)
SODIUM BLD-SCNC: 149 MMOL/L (ref 136–145)
SODIUM BLD-SCNC: 150 MMOL/L (ref 136–145)
SODIUM BLD-SCNC: 150 MMOL/L (ref 136–145)
SODIUM SERPL-SCNC: 146 MMOL/L (ref 136–145)
SODIUM SERPL-SCNC: 146 MMOL/L (ref 136–145)
SODIUM SERPL-SCNC: 149 MMOL/L (ref 136–145)
SODIUM SERPL-SCNC: 150 MMOL/L (ref 136–145)
SPECIMEN EXPIRATION DATE: NORMAL
SPECIMEN SOURCE: ABNORMAL
TOTAL CELLS COUNTED SPEC: 100
UNIT DISPENSE STATUS: NORMAL
UNIT PRODUCT CODE: NORMAL
UNIT RH: NORMAL
VARIANT LYMPHS # BLD AUTO: 1 %
VENT AC: 16
VENT AC: 20
VENT- AC: AC
VT SETTING VENT: 410 ML
WBC # BLD AUTO: 13.61 THOUSAND/UL (ref 4.31–10.16)
WBC # BLD AUTO: 8.46 THOUSAND/UL (ref 4.31–10.16)
WBC # BLD AUTO: 8.9 THOUSAND/UL (ref 4.31–10.16)
WBC # BLD AUTO: 9.54 THOUSAND/UL (ref 4.31–10.16)

## 2021-01-15 PROCEDURE — 99024 POSTOP FOLLOW-UP VISIT: CPT | Performed by: SURGERY

## 2021-01-15 PROCEDURE — 80048 BASIC METABOLIC PNL TOTAL CA: CPT | Performed by: EMERGENCY MEDICINE

## 2021-01-15 PROCEDURE — 82948 REAGENT STRIP/BLOOD GLUCOSE: CPT

## 2021-01-15 PROCEDURE — 83735 ASSAY OF MAGNESIUM: CPT | Performed by: EMERGENCY MEDICINE

## 2021-01-15 PROCEDURE — 99291 CRITICAL CARE FIRST HOUR: CPT | Performed by: INTERNAL MEDICINE

## 2021-01-15 PROCEDURE — 82805 BLOOD GASES W/O2 SATURATION: CPT | Performed by: EMERGENCY MEDICINE

## 2021-01-15 PROCEDURE — 82550 ASSAY OF CK (CPK): CPT | Performed by: EMERGENCY MEDICINE

## 2021-01-15 PROCEDURE — 82550 ASSAY OF CK (CPK): CPT | Performed by: PHYSICIAN ASSISTANT

## 2021-01-15 PROCEDURE — 86901 BLOOD TYPING SEROLOGIC RH(D): CPT | Performed by: EMERGENCY MEDICINE

## 2021-01-15 PROCEDURE — 85027 COMPLETE CBC AUTOMATED: CPT | Performed by: EMERGENCY MEDICINE

## 2021-01-15 PROCEDURE — 82805 BLOOD GASES W/O2 SATURATION: CPT | Performed by: NURSE PRACTITIONER

## 2021-01-15 PROCEDURE — 84100 ASSAY OF PHOSPHORUS: CPT | Performed by: EMERGENCY MEDICINE

## 2021-01-15 PROCEDURE — 86923 COMPATIBILITY TEST ELECTRIC: CPT

## 2021-01-15 PROCEDURE — 94003 VENT MGMT INPAT SUBQ DAY: CPT

## 2021-01-15 PROCEDURE — 86920 COMPATIBILITY TEST SPIN: CPT

## 2021-01-15 PROCEDURE — 94760 N-INVAS EAR/PLS OXIMETRY 1: CPT

## 2021-01-15 PROCEDURE — 86900 BLOOD TYPING SEROLOGIC ABO: CPT | Performed by: EMERGENCY MEDICINE

## 2021-01-15 PROCEDURE — 86850 RBC ANTIBODY SCREEN: CPT | Performed by: EMERGENCY MEDICINE

## 2021-01-15 PROCEDURE — 82553 CREATINE MB FRACTION: CPT | Performed by: PHYSICIAN ASSISTANT

## 2021-01-15 PROCEDURE — 71045 X-RAY EXAM CHEST 1 VIEW: CPT

## 2021-01-15 PROCEDURE — C9113 INJ PANTOPRAZOLE SODIUM, VIA: HCPCS | Performed by: EMERGENCY MEDICINE

## 2021-01-15 PROCEDURE — 87040 BLOOD CULTURE FOR BACTERIA: CPT | Performed by: EMERGENCY MEDICINE

## 2021-01-15 PROCEDURE — 82330 ASSAY OF CALCIUM: CPT | Performed by: EMERGENCY MEDICINE

## 2021-01-15 PROCEDURE — P9016 RBC LEUKOCYTES REDUCED: HCPCS

## 2021-01-15 PROCEDURE — 94640 AIRWAY INHALATION TREATMENT: CPT

## 2021-01-15 PROCEDURE — 85730 THROMBOPLASTIN TIME PARTIAL: CPT | Performed by: INTERNAL MEDICINE

## 2021-01-15 PROCEDURE — 82553 CREATINE MB FRACTION: CPT | Performed by: EMERGENCY MEDICINE

## 2021-01-15 RX ORDER — OXYCODONE HYDROCHLORIDE 10 MG/1
10 TABLET ORAL EVERY 6 HOURS PRN
Status: DISCONTINUED | OUTPATIENT
Start: 2021-01-15 | End: 2021-01-18

## 2021-01-15 RX ORDER — HYDROMORPHONE HCL/PF 1 MG/ML
1 SYRINGE (ML) INJECTION EVERY 2 HOUR PRN
Status: DISCONTINUED | OUTPATIENT
Start: 2021-01-15 | End: 2021-01-16

## 2021-01-15 RX ORDER — MIDAZOLAM HYDROCHLORIDE 2 MG/2ML
1 INJECTION, SOLUTION INTRAMUSCULAR; INTRAVENOUS
Status: DISCONTINUED | OUTPATIENT
Start: 2021-01-15 | End: 2021-01-25

## 2021-01-15 RX ORDER — PANTOPRAZOLE SODIUM 40 MG/1
40 INJECTION, POWDER, FOR SOLUTION INTRAVENOUS EVERY 12 HOURS SCHEDULED
Status: DISCONTINUED | OUTPATIENT
Start: 2021-01-15 | End: 2021-01-26

## 2021-01-15 RX ORDER — BISACODYL 10 MG
10 SUPPOSITORY, RECTAL RECTAL DAILY PRN
Status: DISCONTINUED | OUTPATIENT
Start: 2021-01-15 | End: 2021-02-25 | Stop reason: HOSPADM

## 2021-01-15 RX ORDER — KETAMINE HCL IN NACL, ISO-OSM 100MG/10ML
50 SYRINGE (ML) INJECTION ONCE
Status: COMPLETED | OUTPATIENT
Start: 2021-01-15 | End: 2021-01-15

## 2021-01-15 RX ORDER — HYDROMORPHONE HCL/PF 1 MG/ML
2 SYRINGE (ML) INJECTION ONCE
Status: COMPLETED | OUTPATIENT
Start: 2021-01-15 | End: 2021-01-15

## 2021-01-15 RX ORDER — POLYETHYLENE GLYCOL 3350 17 G/17G
17 POWDER, FOR SOLUTION ORAL DAILY
Status: DISCONTINUED | OUTPATIENT
Start: 2021-01-15 | End: 2021-02-25 | Stop reason: HOSPADM

## 2021-01-15 RX ORDER — HYDROMORPHONE HCL/PF 1 MG/ML
0.5 SYRINGE (ML) INJECTION EVERY 2 HOUR PRN
Status: DISCONTINUED | OUTPATIENT
Start: 2021-01-15 | End: 2021-01-15

## 2021-01-15 RX ORDER — SODIUM CHLORIDE, SODIUM GLUCONATE, SODIUM ACETATE, POTASSIUM CHLORIDE, MAGNESIUM CHLORIDE, SODIUM PHOSPHATE, DIBASIC, AND POTASSIUM PHOSPHATE .53; .5; .37; .037; .03; .012; .00082 G/100ML; G/100ML; G/100ML; G/100ML; G/100ML; G/100ML; G/100ML
50 INJECTION, SOLUTION INTRAVENOUS CONTINUOUS
Status: DISCONTINUED | OUTPATIENT
Start: 2021-01-15 | End: 2021-01-16

## 2021-01-15 RX ORDER — SENNOSIDES 8.6 MG
2 TABLET ORAL 2 TIMES DAILY
Status: DISCONTINUED | OUTPATIENT
Start: 2021-01-15 | End: 2021-01-26

## 2021-01-15 RX ORDER — CALCIUM GLUCONATE 20 MG/ML
2 INJECTION, SOLUTION INTRAVENOUS ONCE
Status: COMPLETED | OUTPATIENT
Start: 2021-01-15 | End: 2021-01-15

## 2021-01-15 RX ORDER — HYDROMORPHONE HCL/PF 1 MG/ML
1 SYRINGE (ML) INJECTION ONCE
Status: COMPLETED | OUTPATIENT
Start: 2021-01-15 | End: 2021-01-15

## 2021-01-15 RX ORDER — KETAMINE HCL IN NACL, ISO-OSM 100MG/10ML
SYRINGE (ML) INJECTION
Status: COMPLETED
Start: 2021-01-15 | End: 2021-01-15

## 2021-01-15 RX ADMIN — HYDROMORPHONE HYDROCHLORIDE 1 MG: 1 INJECTION, SOLUTION INTRAMUSCULAR; INTRAVENOUS; SUBCUTANEOUS at 06:26

## 2021-01-15 RX ADMIN — LEVALBUTEROL HYDROCHLORIDE 1.25 MG: 1.25 SOLUTION, CONCENTRATE RESPIRATORY (INHALATION) at 12:55

## 2021-01-15 RX ADMIN — SODIUM CHLORIDE 8 MG/HR: 9 INJECTION, SOLUTION INTRAVENOUS at 00:04

## 2021-01-15 RX ADMIN — PROPOFOL 30 MCG/KG/MIN: 10 INJECTION, EMULSION INTRAVENOUS at 15:14

## 2021-01-15 RX ADMIN — SODIUM CHLORIDE, SODIUM GLUCONATE, SODIUM ACETATE, POTASSIUM CHLORIDE AND MAGNESIUM CHLORIDE 100 ML/HR: 526; 502; 368; 37; 30 INJECTION, SOLUTION INTRAVENOUS at 00:52

## 2021-01-15 RX ADMIN — CEFEPIME HYDROCHLORIDE 2000 MG: 2 INJECTION, POWDER, FOR SOLUTION INTRAVENOUS at 21:21

## 2021-01-15 RX ADMIN — HYDROMORPHONE HYDROCHLORIDE 1 MG: 1 INJECTION, SOLUTION INTRAMUSCULAR; INTRAVENOUS; SUBCUTANEOUS at 02:00

## 2021-01-15 RX ADMIN — SENNOSIDES 17.2 MG: 8.6 TABLET, FILM COATED ORAL at 10:33

## 2021-01-15 RX ADMIN — KETAMINE HYDROCHLORIDE 0.5 MG/KG/HR: 50 INJECTION, SOLUTION INTRAMUSCULAR; INTRAVENOUS at 12:54

## 2021-01-15 RX ADMIN — Medication 2000 UNITS: at 08:02

## 2021-01-15 RX ADMIN — ZINC SULFATE 220 MG (50 MG) CAPSULE 220 MG: CAPSULE at 08:02

## 2021-01-15 RX ADMIN — PANTOPRAZOLE SODIUM 40 MG: 40 INJECTION, POWDER, FOR SOLUTION INTRAVENOUS at 20:06

## 2021-01-15 RX ADMIN — OXYCODONE HYDROCHLORIDE AND ACETAMINOPHEN 1000 MG: 500 TABLET ORAL at 20:06

## 2021-01-15 RX ADMIN — CEFEPIME HYDROCHLORIDE 2000 MG: 2 INJECTION, POWDER, FOR SOLUTION INTRAVENOUS at 09:43

## 2021-01-15 RX ADMIN — OXYCODONE HYDROCHLORIDE AND ACETAMINOPHEN 1000 MG: 500 TABLET ORAL at 08:02

## 2021-01-15 RX ADMIN — CHLORHEXIDINE GLUCONATE 0.12% ORAL RINSE 15 ML: 1.2 LIQUID ORAL at 20:07

## 2021-01-15 RX ADMIN — HYDROMORPHONE HYDROCHLORIDE 2 MG: 1 INJECTION, SOLUTION INTRAMUSCULAR; INTRAVENOUS; SUBCUTANEOUS at 17:23

## 2021-01-15 RX ADMIN — Medication 50 MG: at 02:00

## 2021-01-15 RX ADMIN — KETAMINE HYDROCHLORIDE 0.5 MG/KG/HR: 50 INJECTION, SOLUTION INTRAMUSCULAR; INTRAVENOUS at 06:51

## 2021-01-15 RX ADMIN — Medication 150 MCG/HR: at 04:29

## 2021-01-15 RX ADMIN — Medication 150 MCG/HR: at 09:31

## 2021-01-15 RX ADMIN — CHLORHEXIDINE GLUCONATE 0.12% ORAL RINSE 15 ML: 1.2 LIQUID ORAL at 08:02

## 2021-01-15 RX ADMIN — HYDROMORPHONE HYDROCHLORIDE 1 MG: 1 INJECTION, SOLUTION INTRAMUSCULAR; INTRAVENOUS; SUBCUTANEOUS at 09:52

## 2021-01-15 RX ADMIN — SODIUM CHLORIDE SOLN NEBU 3% 4 ML: 3 NEBU SOLN at 07:36

## 2021-01-15 RX ADMIN — ATORVASTATIN CALCIUM 40 MG: 40 TABLET, FILM COATED ORAL at 23:04

## 2021-01-15 RX ADMIN — SODIUM CHLORIDE, SODIUM GLUCONATE, SODIUM ACETATE, POTASSIUM CHLORIDE AND MAGNESIUM CHLORIDE 100 ML/HR: 526; 502; 368; 37; 30 INJECTION, SOLUTION INTRAVENOUS at 08:35

## 2021-01-15 RX ADMIN — KETAMINE HYDROCHLORIDE 0.5 MG/KG/HR: 50 INJECTION INTRAMUSCULAR; INTRAVENOUS at 16:36

## 2021-01-15 RX ADMIN — VANCOMYCIN HYDROCHLORIDE 1500 MG: 10 INJECTION, POWDER, LYOPHILIZED, FOR SOLUTION INTRAVENOUS at 23:04

## 2021-01-15 RX ADMIN — SODIUM CHLORIDE, SODIUM GLUCONATE, SODIUM ACETATE, POTASSIUM CHLORIDE, MAGNESIUM CHLORIDE, SODIUM PHOSPHATE, DIBASIC, AND POTASSIUM PHOSPHATE 50 ML/HR: .53; .5; .37; .037; .03; .012; .00082 INJECTION, SOLUTION INTRAVENOUS at 13:30

## 2021-01-15 RX ADMIN — SENNOSIDES 17.2 MG: 8.6 TABLET, FILM COATED ORAL at 17:22

## 2021-01-15 RX ADMIN — LEVALBUTEROL HYDROCHLORIDE 1.25 MG: 1.25 SOLUTION, CONCENTRATE RESPIRATORY (INHALATION) at 19:04

## 2021-01-15 RX ADMIN — SODIUM CHLORIDE 8 MG/HR: 9 INJECTION, SOLUTION INTRAVENOUS at 10:36

## 2021-01-15 RX ADMIN — Medication 0.5 MG/HR: at 10:57

## 2021-01-15 RX ADMIN — SODIUM CHLORIDE 1 MCG/KG/HR: 9 INJECTION, SOLUTION INTRAVENOUS at 01:28

## 2021-01-15 RX ADMIN — POLYETHYLENE GLYCOL 3350 17 G: 17 POWDER, FOR SOLUTION ORAL at 10:33

## 2021-01-15 RX ADMIN — HYDROMORPHONE HYDROCHLORIDE 1 MG: 1 INJECTION, SOLUTION INTRAMUSCULAR; INTRAVENOUS; SUBCUTANEOUS at 13:33

## 2021-01-15 RX ADMIN — VANCOMYCIN HYDROCHLORIDE 1500 MG: 10 INJECTION, POWDER, LYOPHILIZED, FOR SOLUTION INTRAVENOUS at 10:35

## 2021-01-15 RX ADMIN — DEXAMETHASONE SODIUM PHOSPHATE 7.3 MG: 10 INJECTION, SOLUTION INTRAMUSCULAR; INTRAVENOUS at 19:52

## 2021-01-15 RX ADMIN — LEVALBUTEROL HYDROCHLORIDE 1.25 MG: 1.25 SOLUTION, CONCENTRATE RESPIRATORY (INHALATION) at 00:24

## 2021-01-15 RX ADMIN — DEXAMETHASONE SODIUM PHOSPHATE 7.3 MG: 10 INJECTION, SOLUTION INTRAMUSCULAR; INTRAVENOUS at 08:03

## 2021-01-15 RX ADMIN — CALCIUM GLUCONATE 2 G: 20 INJECTION, SOLUTION INTRAVENOUS at 09:28

## 2021-01-15 RX ADMIN — HYDROMORPHONE HYDROCHLORIDE 2 MG: 1 INJECTION, SOLUTION INTRAMUSCULAR; INTRAVENOUS; SUBCUTANEOUS at 15:14

## 2021-01-15 RX ADMIN — PANTOPRAZOLE SODIUM 40 MG: 40 INJECTION, POWDER, FOR SOLUTION INTRAVENOUS at 13:07

## 2021-01-15 RX ADMIN — SODIUM CHLORIDE 1 MCG/KG/HR: 9 INJECTION, SOLUTION INTRAVENOUS at 02:39

## 2021-01-15 RX ADMIN — SODIUM CHLORIDE 3 UNITS/HR: 9 INJECTION, SOLUTION INTRAVENOUS at 17:25

## 2021-01-15 RX ADMIN — HEPARIN SODIUM 3000 UNITS: 1000 INJECTION INTRAVENOUS; SUBCUTANEOUS at 14:14

## 2021-01-15 RX ADMIN — POTASSIUM PHOSPHATE, MONOBASIC AND POTASSIUM PHOSPHATE, DIBASIC 30 MMOL: 224; 236 INJECTION, SOLUTION, CONCENTRATE INTRAVENOUS at 08:35

## 2021-01-15 RX ADMIN — SODIUM CHLORIDE SOLN NEBU 3% 4 ML: 3 NEBU SOLN at 12:55

## 2021-01-15 RX ADMIN — LEVALBUTEROL HYDROCHLORIDE 1.25 MG: 1.25 SOLUTION, CONCENTRATE RESPIRATORY (INHALATION) at 07:37

## 2021-01-15 RX ADMIN — SODIUM CHLORIDE SOLN NEBU 3% 4 ML: 3 NEBU SOLN at 19:04

## 2021-01-15 RX ADMIN — MIDAZOLAM 1 MG: 1 INJECTION INTRAMUSCULAR; INTRAVENOUS at 01:21

## 2021-01-15 RX ADMIN — SODIUM CHLORIDE SOLN NEBU 3% 4 ML: 3 NEBU SOLN at 00:24

## 2021-01-15 NOTE — PROGRESS NOTES
As per Jackye Frames hold the platelet transfusion for now will order repeat cbc Spoke to pt's sister. Scheduled consult with Dr Stinson on Monday, 10/23/2017. Verbalized understanding.

## 2021-01-15 NOTE — PROGRESS NOTES
Vancomycin IV Pharmacy-to-Dose Consultation    Quinton Chavez is a 56 y o  male who is currently receiving Vancomycin IV with management by the Pharmacy Consult service  Relevant clinical data and objective / subjective history reviewed  Vancomycin Assessment:  Indication: septic shock s/p AKA guillotine amputation of RLE  Status: critically ill, leukocytosis, Tmax 100 4  Micro:   1/15 Blood cultures x 2: in process  1/12 Urine culture: no growth <1000 cfu/mL  1/12 Legionella antigen, urine: negative  1/12 Strep pneumoniae, urine: negative  1/11 MRSA culture: negative  1/11 Blood cultures x 2: no growth at 72 hours  Procalcitonin: 0 31 > 0 65 > 1 64 > 1 13  Renal Function: Stable, Scr 0 62, CrCl > 100 mL/min; UOP 1 6 mL/kg/hr  Potential Nephrotoxicity Factors (select ALL that apply):  Medications: vasopressors  Patient-Factors: advanced age, hypotension, blood loss (surgery)  Days of Therapy: 2  Current Dose: 1500 mg IV q12h (last dose: 1/15 at 1035)  Goal Trough: 15-20 (appropriate for most indications)   Goal AUC(24h): 400-600  Last Level: n/a  Pharmacokinetic Analysis: ke 0 085 hr-; t1/2 8 1 hrs    Vancomycin Plan:  Dosing: continue 1500 mg IV q12h (next dose: 1/15 at 2230)  Predicted Trough / AUC: 15 5 / 607  Next Level: Trough 1/16 at 1000  Renal Function Monitoring: Daily BMP and [place] 19 will continue to follow closely for s/sx of nephrotoxicity, infusion reactions and appropriateness of therapy  BMP and CBC will be ordered per protocol  We will continue to follow the patients culture results and clinical progress daily         Jose De Jesus Walter, PharmD, [address] [facility] Clinical Pharmacist  [phone]

## 2021-01-15 NOTE — QUICK NOTE
Patient's CK dropped from the 42928 range to near 9000  Tube feedings started  Will 1/2 fluid infusion   Will continue to monitor creatinine, CK, and UOP

## 2021-01-15 NOTE — PROGRESS NOTES
Progress Note - Vascular Surgery   Betzy Shankar 56 y o  male MRN: [id number]  [facility] [id number]    Assessment:  61yo M COVID+ with [place] class III ischemia of RLE and resultant rhabdomyolysis, now POD#1 s/p urgent R salvador AKA    Plan:   Tentative plan for R AKA formalization on Monday 1/18/21 pending clinical progress   Maintain R AKA stump dressing, do not remove   Rest of care per ICU    Subjective/Objective     Subjective:   N/A (intubated, sedated)    Objective:    Blood pressure 104/58, pulse 68, temperature 98 6 °F (37 °C), temperature source Esophageal, resp  rate 16, height 5' 6" (1 676 m), weight 85 1 kg (187 lb 9 8 oz), SpO2 99 %  ,Body mass index is 30 28 kg/m²  Intake/Output Summary (Last 24 hours) at 1/15/2021 0658  Last data filed at 1/15/2021 0630  Gross per 24 hour   Intake 5452 65 ml   Output 3260 ml   Net 2192 65 ml       Invasive Devices     Central Venous Catheter Line            CVC Central Lines 01/12/21 Triple Right 2 days          Peripheral Intravenous Line            Peripheral IV 01/12/21 Left;Upper Arm 3 days    Peripheral IV 01/12/21 Right Forearm 3 days    Peripheral IV 01/15/21 Left Antecubital less than 1 day          Arterial Line            Arterial Line 01/12/21 Radial 2 days          Drain            Urethral Catheter 18 Fr  -- days    NG/OG/Enteral Tube Orogastric 16 Fr 3 days          Airway            ETT  Oral;Hi-Lo; Cuffed 7 5 mm 3 days                Physical Exam:   Gen:  ill-appearing  HENT: ETT/OGT in place  CV:  RRR  Lungs: mechanically ventilated  Abd:  soft, NT/ND  : Veras in place   groin dressings C/D/I   Ext:  anasarca present   R AKA stump dressing in place  Skin: scattered ecchymosis  Neuro: sedated     Results from last 7 days   Lab Units 01/15/21  0557 01/14/21  2359 01/14/21  1745   WBC Thousand/uL 13 61* 8 46 11 59*   HEMOGLOBIN g/dL 7 5* 7 8* 8 3*   HEMATOCRIT % 22 5* 23 5* 25 5*   PLATELETS Thousands/uL 75* 40* 53* Results from last 7 days   Lab Units 01/15/21  0557 01/14/21  2359 01/14/21  1745  01/12/21  1116   POTASSIUM mmol/L 3 9 4 3 4 9   < >  --    CHLORIDE mmol/L 118* 117* 121*   < >  --    CO2 mmol/L 23 22 24   < >  --    CO2, I-STAT mmol/L  --   --   --   --  20*   BUN mg/dL 19 18 19   < >  --    CREATININE mg/dL 0 62 0 61 0 64   < >  --    GLUCOSE, ISTAT mg/dl  --   --   --   --  132   CALCIUM mg/dL 6 8* 6 8* 7 1*   < >  --     < > = values in this interval not displayed  Results from last 7 days   Lab Units 01/15/21  0557 01/14/21 2356 01/14/21  1032  01/12/21  1831 01/12/21  0327   INR   --  1 44*  --   --  1 70* 1 52*   PTT seconds 61* 37 105*   < > 34 29    < > = values in this interval not displayed

## 2021-01-15 NOTE — PHYSICAL THERAPY NOTE
Physical Therapy Cancellation Note    PT orders received chart review completed  Pt is currently intubated/sedated and not appropriate to participate in skilled PT at this time  PT will follow and eval as medically appropriate       01/15/21 1100   PT Last Visit   PT Visit Date 01/15/21   Note Type   Cancel Reasons Intubated/sedated     Lisandro Momin, PT

## 2021-01-15 NOTE — PROCEDURES
Central Line    Date/Time: 1/12/2021 10:20 AM  Performed by: Joo Mendez MD  Authorized by: Joo Mendez MD     Patient location:  ICU  Consent:     Consent obtained:  Emergent situation    Alternatives discussed:  No treatment  Universal protocol:     Radiology Images displayed and confirmed  If images not available, report reviewed: yes      Required blood products, implants, devices, and special equipment available: yes      Site/side marked: yes      Immediately prior to procedure, a time out was called: yes      Patient identity confirmed:  Arm band  Pre-procedure details:     Hand hygiene: Hand hygiene performed prior to insertion      Sterile barrier technique: All elements of maximal sterile technique followed      Skin preparation:  2% chlorhexidine    Skin preparation agent: Skin preparation agent completely dried prior to procedure    Indications:     Central line indications: medications requiring central line and hemodynamic monitoring    Anesthesia (see MAR for exact dosages): Anesthesia method:  None  Procedure details:     Location:  Right internal jugular    Vessel type: vein      Laterality:  Right    Approach: percutaneous technique used      Patient position:  Reverse Trendelenburg    Catheter type:  Triple lumen    Catheter size:  7 5 Fr    Landmarks identified: yes      Ultrasound guidance: yes      Sterile ultrasound techniques: Sterile gel and sterile probe covers were used      Manometry confirmation: no      Number of attempts:  1    Successful placement: yes    Post-procedure details:     Post-procedure:  Dressing applied    Assessment:  No pneumothorax on x-ray, placement verified by x-ray, free fluid flow and blood return through all ports    Patient tolerance of procedure: Tolerated well, no immediate complications  Comments:      Very collapsable vessel

## 2021-01-15 NOTE — QUICK NOTE
Patient's contact, [facility], called and updated on the patient's medical problems and treatment course including, but not limited to, the COVID pneumonia, DKA, thrombi, emboli, limb ischemia, rhabdomyolysis, and amputation, ventilation, fluids, sedation, feeds, analgesia, and the tentative plan for formalization on Monday  Patient's questions were answered to his satisfaction

## 2021-01-15 NOTE — NUTRITION
In light of Propfol at 12 8 ml/hr, recommend start Glucerna 1 2 at 10 ml/hr and increase by 10 ml q 4 hrs as tolerated to goal rate of 56 ml/hr to provide qd:1344 ml,1613 Kcal,81 gm PRO,154 gm CHO,81 gm Fat,1082 ml Free H2O,1 3 mg CHO/kg/min  Replete Phosphorus  Check Ionized Ca

## 2021-01-15 NOTE — RESPIRATORY THERAPY NOTE
RT Protocol Note  Amy Aj 56 y o  male MRN: [id number]  [facility]: [id number]    Assessment    Principal Problem:    Pneumonia due to COVID-19 virus  Active Problems:    Diabetic ketoacidosis without coma associated with type 2 diabetes mellitus ([place] [address] )    Acute metabolic encephalopathy    Acute kidney injury ([place] [address] )    Acute respiratory failure with hypoxia (HCC)    Sepsis due to COVID-19 ([place] [address] )    Hypernatremia    Metabolic acidosis, increased anion gap    Aortic thrombus (HCC)      Home Pulmonary Medications:    Home Devices/Therapy: (None per chart)    Past Medical History:   Diagnosis Date    Diabetes mellitus ([place] [address] )     GERD (gastroesophageal reflux disease)     Hypercholesteremia     Hypertension     Methadone use ([place] [address] )      Social History     Socioeconomic History    Marital status:      Spouse name: Not on file    Number of children: Not on file    Years of education: Not on file    Highest education level: Not on file   Occupational History    Not on file   Social Needs    Financial resource strain: Not on file    Food insecurity     Worry: Not on file     Inability: Not on file    Transportation needs     Medical: Not on file     Non-medical: Not on file   Tobacco Use    Smoking status: Former Smoker    Smokeless tobacco: Never Used   Substance and Sexual Activity    Alcohol use: No    Drug use: No     Comment: methadone    Sexual activity: Not on file   Lifestyle    Physical activity     Days per week: Not on file     Minutes per session: Not on file    Stress: Not on file   Relationships    Social connections     Talks on phone: Not on file     Gets together: Not on file     Attends Yazidism service: Not on file     Active member of club or organization: Not on file     Attends meetings of clubs or organizations: Not on file     Relationship status: Not on file    Intimate partner violence     Fear of current or ex partner: Not on file     Emotionally abused: Not on file     Physically abused: Not on file     Forced sexual activity: Not on file   Other Topics Concern    Not on file   Social History Narrative    Not on file       Subjective         Objective    Physical Exam:   Assessment Type: Pre-treatment  General Appearance: Sedated  Respiratory Pattern: Assisted  Chest Assessment: Chest expansion symmetrical  Bilateral Breath Sounds: Diminished  Suction: ET Tube    Vitals:  Blood pressure 104/58, pulse 84, temperature 97 9 °F (36 6 °C), resp  rate (!) 30, height 5' 6" (1 676 m), weight 85 1 kg (187 lb 9 8 oz), SpO2 97 %  Results from last 7 days   Lab Units 01/14/21  1154  01/12/21  1116   PH ART  7 453*   < >  --    PCO2 ART mm Hg 28 4*   < >  --    PO2 ART mm Hg 112 2   < >  --    HCO3 ART mmol/L 19 4*   < >  --    BASE EXC ART mmol/L -3 8   < >  --    O2 CONTENT ART mL/dL 11 8*   < >  --    O2 HGB, ARTERIAL % 97 2*   < >  --    ABG SOURCE  Line, Arterial   < >  --    SAMMIE TEST   --   --  Candy Custard Test    < > = values in this interval not displayed  Imaging and other studies: I have personally reviewed pertinent reports  Plan    Respiratory Plan: Vent/NIV/HFNC        Resp Comments: (P) Pt remains intubated and evaluated per respiratory protocol  Pt s/p AKA and +COVID  Per chart, he has a history of smoking, no diagnosed pulmonary history of home meds listed per chart  BS are diminished with scant amount of white thin secretions suctioned from ETT  Will continue hypertonic/xopenex UDNs while intubated as ordered due to COVID PNA  Pt remains on CMV vent settings at this time  Will continue to monitor and assess patient per protocol

## 2021-01-15 NOTE — CASE MANAGEMENT
LOS: 2 days  Re-admission: No  Risk of re-admission: Red  Bundle:No    CM consulted for NOK  Pt COVID+  Intubated/sedated  Pt sister, Cari Toledo, who is listed as the emergency contact is currently hospitalized herself with COVID+  TC to Edgardo's home number [phone]  Pt's nephew, Glen Caicedo, answered the phone  Jeremie Haile confirmed pt sister, Cari Toledo, who is Jeremie Haile mother is currently in [employer] who is receiving treatment for COVID  As per Jose Francisco Placido is also experiencing delirium  As per Jeremie Haile - pt is does not have a POA/LW  Single   Parents are    Pt has an estranged daughter for over 21 years - resides in [place] but no contact information   Pt has 3 siblings: Girma Kelloggedward, Jose Lkobi Goldberg, and Remingtontammyulises Angelita  Jeremie Haile reports Rolanda Santos has his "own problems " As per Casey Guido is addicted to pills  Jeremie Haile reports out of the siblings - Girma Abrams [phone], is the best contact due to Cari Toledo being in the hospital      TC to [place] reports the family communicates well and the preference is Jeremie Haile as the main contact for CCM  As per [facility]  has done a great job of keeping the family updated on pt's current medical care  Huong Chacon understands she may be contacted for consents  Huong Chacon confirmed information Jeremie Haile provided above  CM made Huong Chacon aware that Face sheet would reflect Jeremie Haile as primary, Huong Chacon as secondary, and Cari Toledo as third  Facesheet updated  CM reviewed above information with [employer] legal - Nanci Villarrealq  As per Daija Ruggiero - due to pt daughter being estranged over 20 years and no contact information the CCM team can proceed with using Jeremie Haile and Huong Chacon as pt decision makers as they are readily available  Jeremie Haile reports home is 3 stories (has live in [facility]) with 3 FRANDY  Pt resides with his siblings: Rolanda Santos and Cari Toledo and nephew's Jeremie Haile and Shahbaz  Bedroom/bathroom on 2nd floor  No half bathroom on first  Independent with ADLs and ambulation PTA   Pt works full time and drives  No prio hx of VNA, STR, MH, or alcohol abuse  Pt has hx of drug abuse  20 years sober  Methadone maintenance through [facility] - pt goes weekly  No POA or LW  Pharmacy is [address] [facility] in [place]  PCP: [facility], DO  :  Bo Mckeonmarcus (h) [phone] or (c) [phone]  [facility] c) [phone]    [facility] resident, Dr Dayan Martinez, and bedside nurse updated  OFELIA Francisco  Also informed Brandee Flynn to contact pt employer for [employer] or STD  CM reviewed d/c planning process including the following: identifying help at home, patient preference for d/c planning needs, Discharge Lounge, [facility] Meds to Bed program, availability of treatment team to discuss questions or concerns patient and/or family may have regarding understanding medications and recognizing signs and symptoms once discharged  CM also encouraged patient to follow up with all recommended appointments after discharge  Patient advised of importance for patient and family to participate in managing patients medical well being

## 2021-01-15 NOTE — PROGRESS NOTES
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  His platelets improved without intervention and he was restarted on heparin gtt  Patient has had changed in his vent settings and sedation medications due to tachypnea, secondary to agitation, and hypotension (SBP 60s)  Repeat CXR was performed and showed no pneumo  No pericardial effusion on bedside US  Propofol was d/c'd and patient was started on NE gtt  Nursing concerns, in addition to what was described above, include weeping from the dressings  Vascular surgery made aware by nursing  No bowel movement overnight  Afebrile overnight  HR in the 60s to 100s range  RR in the 10s to 30s  MAPS in the 46s to 90s  O2 in the 90s  CK trending down  Worsening leukocytosis  Hgb 7 5 from 7 8  Glucose in the 170s to 200 range  UOP 3 26L in 24 hours  K 3 9  Mg 2 4  Phos 1 3, Calcium   91    Current vent settings:  Current Sedation: Ketamine , fentanyl 150, precedex 1, propofol 20  Vasopressors: Norepi 2        ---------------------------------------------------------------------------------------  SUBJECTIVE  Patient unable to provide subjective history  Review of Systems   Unable to perform ROS: Intubated     Review of systems was unable to be performed secondary to Intubation and sedation  ---------------------------------------------------------------------------------------  Assessment and Plan:  Neuro:   · Diagnosis: Encephalopathy  ? Plan:  ICU care, pain and sedation  ? Goal RASS -2 to 0  § Sedation with Precedex, fentanyl  ? CAM-ICU  ? Sleep-Wake Cycle Regulation     CV:   · Diagnosis:  Aortic thrombus,  history of hypertension with hypotension  ?  Plan:   ? Goal map greater than 65  § Hold home BP meds  § Levophed gtt  § Volume resuscitation as needed  § Isolyte fluid resusitation  ? Continue to closely monitor hemodynamics  ? Closely monitor on tele  ? Continue heparin drip  ? Vascular surgery following     Pulm:  · Diagnosis:  Acute COVID-19 infection, acute hypoxic respiratory failure, abnormal CXR, Pulmonary embolism  ? Plan:   ? Patient intubated on 01/12 due to hypoxia  ? Continue zinc, vitamin-D, vitamin-C, statin  § Consider pressure support  ? Severe COVID-19 pathway  ? Remdesivir not given  ? Good pulm hygene  ? Frequent suctioning  ? Continue cefepime        GI:   · Diagnosis: GERD, Suspected Upper GI bleed  ? Plan:   ? No evidence of active bleeding  ? Continue protonix, chane to BID from gtt  ? Contact GI and endoscopy if blood in the OG-tube or signs and symptoms of GI bleeding  ? Q6hour CBCs  ? Bowel regimen        :   · Diagnosis:  Gross hematuria, ANNALEE that has resolved, UTI, likely myoglobinuria  ? Plan:   ? Moderate bacteria on urine microscopy on presentation  ? Continue antibiotics  ? Trend BMP q6hr  ? Per Urology, maintain Veras catheter for 7 days  ? Monitor for bleeding  ? Strict I&Os   ? Creatinine stable   62  ? Urine output at goal        F/E/N:   · Plan:   · Fluids:  Isolyte gtt  · Electrolytes: Trend and replete as needed  · Nutrition:   Consider starting feedings        Heme/Onc:   · Diagnosis:  Thrombocytopenia  ? Plan:  ? Predated initiation of heparin  ? Peripheral smear  ? No schistocytes noted on peripheral smear; however, poikilocytes, tear drop cells, linda cells, anisocytosis present  ? Hemolysis smear still pending  ? Q6hour CBC        Endo:   · Diagnosis: DMII, DKA - now resolved  ? Plan:   § Continue ISS   § Maintain BS between 140-180        ID:   · Diagnosis:  Decreased blood pressures  ? Plan:   ? Continue to closely monitor for signs of infection  ? Trend daily fever curve and WBC  ?  Continue cefepime, vancomycin     MSK/Skin:   · Diagnosis: Acute Limb Ischemia, Rhabdomyolysis  ? Plan:   ? S/p RLE AKA guilamado amputation  ? Per vascular surgery, do not remove stump dressing  Formalization on 1/18    ? POD 1  ? Continue to turn and reposition frequently  ? Continue pressure off-loading  ? PT/OT  ? UOP at goal, creatinine stable, CK trending down  ? LLE pulses newly difficult to palpate, mottling on toes  Pulse present on doppler  Vascular made aware      Disposition: Continue Critical Care   Code Status: Level 1 - Full Code  ---------------------------------------------------------------------------------------  ICU CORE MEASURES    Prophylaxis   VTE Pharmacologic Prophylaxis: Heparin Drip  VTE Mechanical Prophylaxis: sequential compression device On LLE  Stress Ulcer Prophylaxis: Pantoprazole IV     ABCDE Protocol (if indicated)  Plan to perform spontaneous awakening trial today? No  Plan to perform spontaneous breathing trial today? No  Obvious barriers to extubation? Yes  CAM-ICU: Negative    Invasive Devices Review  Invasive Devices     Central Venous Catheter Line            CVC Central Lines 01/12/21 Triple Right 2 days          Peripheral Intravenous Line            Peripheral IV 01/12/21 Left;Upper Arm 3 days    Peripheral IV 01/12/21 Right Forearm 3 days    Peripheral IV 01/15/21 Left Antecubital less than 1 day          Arterial Line            Arterial Line 01/12/21 Radial 2 days          Drain            Urethral Catheter 18 Fr  -- days    NG/OG/Enteral Tube Orogastric 16 Fr 3 days          Airway            ETT  Oral;Hi-Lo; Cuffed 7 5 mm 3 days              Can any invasive devices be discontinued today?  No  ---------------------------------------------------------------------------------------  OBJECTIVE    Vitals   Vitals:    01/15/21 0500 01/15/21 0600 01/15/21 0702 01/15/21 0737   BP:       BP Location:       Pulse: 68 74 70    Resp: 16 16 16    Temp: 98 6 °F (37 °C) 98 6 °F (37 °C) 99 °F (37 2 °C)    TempSrc: Esophageal      SpO2: 99% 98% 98% 99%   Weight: Height:         Temp (24hrs), Av 7 °F (37 1 °C), Min:97 9 °F (36 6 °C), Max:100 4 °F (38 °C)  Current: Temperature: 99 °F (37 2 °C)  HR: 68  BP: 142/54  RR: 16  SpO2: 99    Respiratory:  SpO2: SpO2: 99 %       Invasive/non-invasive ventilation settings   Respiratory    Lab Data (Last 4 hours)    None         O2/Vent Data (Last 4 hours)      01/15 0419 01/15 0737        Patient safety screen outcome: Failed Failed                  Physical Exam  Vitals signs reviewed  Constitutional:       Appearance: He is ill-appearing  Comments: Patient is intubated and sedated   HENT:      Head: Normocephalic  Right Ear: External ear normal       Left Ear: External ear normal       Nose: Nose normal       Mouth/Throat:      Mouth: Mucous membranes are moist    Eyes:      Pupils: Pupils are equal, round, and reactive to light  Neck:      Musculoskeletal: Neck supple  Cardiovascular:      Rate and Rhythm: Normal rate  Heart sounds: No murmur  No friction rub  No gallop  Comments: Left lower extremity dorsalis pedis pulses difficult palpate has continued yesterday  There appeared to be mottling at the toes as well  The distal left lower extremity is warm to touch and pulsations are present with Doppler  Pulmonary:      Breath sounds: Rhonchi present  Comments: On ventilator  Abdominal:      Palpations: Abdomen is soft  Musculoskeletal:      Left lower leg: No edema  Skin:     General: Skin is warm and dry  Capillary Refill: Capillary refill takes less than 2 seconds     Neurological:      Comments: Patient is intubated and sedated making this component of the exam to perform   Psychiatric:      Comments: Patient is intubated and sedated making this component of the exam to perform         Laboratory and Diagnostics:  Results from last 7 days   Lab Units 01/15/21  0557 21  2359 21  1745 21  1154 21  0408 21  2328 21  1746 21  1611 01/13/21  0440  01/12/21  0618 01/11/21  1020   WBC Thousand/uL 13 61* 8 46 11 59* 10 50* 10 45* 10 30*  --  7 65 6 71   < > 11 76* 20 20*   HEMOGLOBIN g/dL 7 5* 7 8* 8 3* 8 3* 9 0* 8 9* 9 1* 9 1* 9 7*   < > 12 9 15 6   I STAT HEMOGLOBIN   --   --   --   --   --   --   --   --   --    < >  --   --    HEMATOCRIT % 22 5* 23 5* 25 5* 25 2* 28 2* 27 2* 27 0* 26 5* 29 2*   < > 37 5 48 3*   HEMATOCRIT, ISTAT   --   --   --   --   --   --   --   --   --    < >  --   --    PLATELETS Thousands/uL 75* 40* 53* 38* 39* 41*  --  34* 44*   < > 108* 237   NEUTROS PCT %  --   --   --   --   --   --   --   --   --   --  91*  --    BANDS PCT %  --   --   --   --   --   --   --  14* 23*  --   --   --    MONOS PCT %  --   --   --   --   --   --   --   --   --   --  3*  --    MONO PCT %  --  1*  --   --   --   --   --  2* 3*  --   --  7    < > = values in this interval not displayed       Results from last 7 days   Lab Units 01/15/21  0557 01/14/21  2359 01/14/21  1745 01/14/21  1154 01/14/21  0408 01/13/21  2328 01/13/21  0840 01/13/21  0440  01/11/21  1422   SODIUM mmol/L 146* 146* 150* 148* 147* 145 148* 151*  151*   < > 150*   POTASSIUM mmol/L 3 9 4 3 4 9 4 3 4 4 5 7* 4 3 4 6  4 6   < > 3 3*   CHLORIDE mmol/L 118* 117* 121* 122* 121* 121* 121* 122*  120*   < > 111*   CO2 mmol/L 23 22 24 22 24 24 20* 21  21   < > 10*   CO2, I-STAT   --   --   --   --   --   --   --   --    < >  --    ANION GAP mmol/L 5 7 5 4 2* 0* 7 8  10   < > 29*   BUN mg/dL 19 18 19 19 22 23 28* 29*  30*   < > 64*   CREATININE mg/dL 0 62 0 61 0 64 0 63 0 69 0 64 0 67 0 83  0 75   < > 1 43*   CALCIUM mg/dL 6 8* 6 8* 7 1* 7 1* 7 1* 6 8* 6 9* 7 0*  7 2*   < > 9 2   GLUCOSE RANDOM mg/dL 155* 183* 151* 142* 160* 231* 174* 172*  172*   < > 550*   ALT U/L  --   --   --   --   --   --   --  33  --  25   AST U/L  --   --   --   --   --   --   --  77*  --  29   ALK PHOS U/L  --   --   --   --   --   --   --  77  --  114   ALBUMIN g/dL  --   --   --   --   --   -- --  1 4*  --  2 9*   TOTAL BILIRUBIN mg/dL  --   --   --   --   --   --   --  0 50  --  0 70    < > = values in this interval not displayed  Results from last 7 days   Lab Units 01/15/21  0557 01/14/21  1154 01/14/21  0408 01/13/21  0840 01/13/21  0440 01/13/21  0027 01/12/21  2042   MAGNESIUM mg/dL 2 4 2 3 2 5 2 1 2 2 2 3 2 4   PHOSPHORUS mg/dL 1 3* 1 5* 2 4 2 1* 2 6 2 0* 2 2*      Results from last 7 days   Lab Units 01/15/21  0557 01/14/21  2356 01/14/21  1032 01/14/21  0421 01/13/21  1746 01/13/21  0840 01/13/21  0027 01/12/21  1831 01/12/21  0327 01/11/21  1020   INR   --  1 44*  --   --   --   --   --  1 70* 1 52* 1 38*   PTT seconds 61* 37 105* 168* 113* 39* >210* 34 29 26      Results from last 7 days   Lab Units 01/11/21  1020   TROPONIN I ng/mL <0 03     Results from last 7 days   Lab Units 01/13/21  2328 01/13/21  1205 01/12/21  1301 01/12/21  1008 01/12/21  0322 01/12/21  0206 01/11/21  2303   LACTIC ACID mmol/L 1 4 1 7 2 4* 2 8* 4 6* 3 3* 4 9*     ABG:  Results from last 7 days   Lab Units 01/15/21  0142   PH ART  7 453*   PCO2 ART mm Hg 29 0*   PO2 ART mm Hg 102 5   HCO3 ART mmol/L 19 8*   BASE EXC ART mmol/L -3 5   ABG SOURCE  Line, Arterial     VBG:  Results from last 7 days   Lab Units 01/15/21  0142  01/12/21  1407   PH REAL   --   --  7 322   PCO2 REAL mm Hg  --   --  33 3*   PO2 REAL mm Hg  --   --  70 6*   HCO3 REAL mmol/L  --   --  16 8*   BASE EXC REAL mmol/L  --   --  -8 3   ABG SOURCE  Line, Arterial   < >  --     < > = values in this interval not displayed  Results from last 7 days   Lab Units 01/14/21  0408 01/13/21  0440 01/12/21  0638 01/11/21  1151   PROCALCITONIN ng/ml 1 13* 1 64* 0 65* 0 31*       Micro  Results from last 7 days   Lab Units 01/12/21  0640 01/12/21  0206 01/11/21  1937 01/11/21  1926 01/11/21  1020   BLOOD CULTURE   --   --   --  No Growth at 72 hrs  No Growth at 72 hrs  No Growth at 72 hrs  No Growth at 72 hrs     URINE CULTURE  No Growth <1000 cfu/mL  --   -- --   --    MRSA CULTURE ONLY   --   --  No Methicillin Resistant Staphlyococcus aureus (MRSA) isolated  --   --    LEGIONELLA URINARY ANTIGEN   --  Negative  --   --   --    STREP PNEUMONIAE ANTIGEN, URINE   --  Negative  --   --   --        EKG:   Imaging: CXR I have personally reviewed pertinent films in PACS    Intake and Output  I/O       01/13 0701 - 01/14 0700 01/14 0701 - 01/15 0700    I V  (mL/kg) 806 (9 5) 4558 (53 6)    NG/GT  220    IV Piggyback 1250 674 7    Total Intake(mL/kg) 2056 (24 2) 5452 7 (64 1)    Urine (mL/kg/hr) 2015 3125 (1 5)    Blood 200     Total Output 2215 3125    Net -159 +2327 7              UOP: 135 ml/hr     Height and Weights   Height: 5' 6" (167 6 cm)  IBW: 63 8 kg  Body mass index is 30 28 kg/m²  Weight (last 2 days)     Date/Time   Weight    01/13/21 1600   85 1 (187 61)                Nutrition       Diet Orders   (From admission, onward)             Start     Ordered    01/14/21 2029  Diet NPO  Diet effective now     Question Answer Comment   Diet Type NPO    RD to adjust diet per protocol?  Yes        01/14/21 2029 01/13/21 1432  Room Service  Once     Question:  Type of Service  Answer:  Room Service- Not Appropriate    01/13/21 1431                Active Medications  Scheduled Meds:  Current Facility-Administered Medications   Medication Dose Route Frequency Provider Last Rate    ascorbic acid  1,000 mg Oral Q12H Satish Parekh MD      atorvastatin  40 mg Oral HS Jimmie Birmingham MD      cefepime  2,000 mg Intravenous Q12H Jimmie Birmingham MD Stopped (01/14/21 0881)    chlorhexidine  15 mL Swish & Spit Q12H Satish Parekh MD      cholecalciferol  2,000 Units Oral Daily Jimmie Birmingham MD      dexamethasone  0 1 mg/kg Intravenous Q12H Jimmie Birmingham MD      dexmedetomidine  0 1-1 mcg/kg/hr Intravenous Titrated Jimmie Birmingham MD 0 7 mcg/kg/hr (01/15/21 7141)    fentaNYL  150 mcg/hr Intravenous Continuous Jimmie Birmingham  mcg/hr (01/15/21 6737)    heparin (porcine)  3-30 Units/kg/hr (Order-Specific) Intravenous Titrated Veronique Lezama MD 11 Units/kg/hr (01/14/21 9071)    heparin (porcine)  3,000 Units Intravenous Q1H PRN Veronique Lezama MD      heparin (porcine)  6,000 Units Intravenous Q1H PRN Veronique Lezama MD      HYDROmorphone  1 mg Intravenous Q2H PRN Veronique Lezama MD      insulin regular (HumuLIN R,NovoLIN R) infusion  0 3-21 Units/hr Intravenous Titrated Veronique Lezama MD 9 Units/hr (01/15/21 0604)    ketamine  0 5 mg/kg/hr Intravenous Continuous Veronique Lezama MD 0 5 mg/kg/hr (01/15/21 0743)    levalbuterol  1 25 mg Nebulization Q6H Veronique Lezama MD      midazolam  1 mg Intravenous Q1H PRN Veronique Lezama MD      multi-electrolyte  100 mL/hr Intravenous Continuous Veronique Lezama  mL/hr (01/15/21 0052)    zinc sulfate  220 mg Oral Daily Veronique Lezama MD      Followed by   Baltazar Jaeger ON 1/19/2021] multivitamin-minerals  1 tablet Oral Daily Veronique Lezama MD      norepinephrine  1-30 mcg/min Intravenous Titrated Veronique Lezama MD 1 mcg/min (01/15/21 8842)    pantoprozole (PROTONIX) infusion (Continuous)  8 mg/hr Intravenous Continuous Veronique Lezama MD 8 mg/hr (01/15/21 0004)    potassium phosphate  30 mmol Intravenous Once Jassi MD Kiana      propofol  5-50 mcg/kg/min Intravenous Titrated Veronique Lezama MD 20 mcg/kg/min (01/15/21 0737)    sodium chloride (PF)  3 mL Intravenous Q1H PRN Veronique Lezama MD      sodium chloride  4 mL Nebulization Q6H Veronique Lezama MD      vancomycin  20 mg/kg (Adjusted) Intravenous Q12H Veronique Lezama MD Stopped (01/14/21 2214)     Continuous Infusions:  dexmedetomidine, 0 1-1 mcg/kg/hr, Last Rate: 0 7 mcg/kg/hr (01/15/21 0741)  fentaNYL, 150 mcg/hr, Last Rate: 150 mcg/hr (01/15/21 0742)  heparin (porcine), 3-30 Units/kg/hr (Order-Specific), Last Rate: 11 Units/kg/hr (01/14/21 7611)  insulin regular (HumuLIN R,NovoLIN R) infusion, 0 3-21 Units/hr, Last Rate: 9 Units/hr (01/15/21 0604)  ketamine, 0 5 mg/kg/hr, Last Rate: 0 5 mg/kg/hr (01/15/21 0743)  multi-electrolyte, 100 mL/hr, Last Rate: 100 mL/hr (01/15/21 0052)  norepinephrine, 1-30 mcg/min, Last Rate: 1 mcg/min (01/15/21 0702)  pantoprozole (PROTONIX) infusion (Continuous), 8 mg/hr, Last Rate: 8 mg/hr (01/15/21 0004)  propofol, 5-50 mcg/kg/min, Last Rate: 20 mcg/kg/min (01/15/21 0737)      PRN Meds:   heparin (porcine), 3,000 Units, Q1H PRN  heparin (porcine), 6,000 Units, Q1H PRN  HYDROmorphone, 1 mg, Q2H PRN  midazolam, 1 mg, Q1H PRN  sodium chloride (PF), 3 mL, Q1H PRN        Allergies   Allergies   Allergen Reactions    Varenicline      ---------------------------------------------------------------------------------------  Advance Directive and Living Will:      Power of :    POLST:    ---------------------------------------------------------------------------------------  Care Time Delivered:   No Critical Care time spent     Nancy Early MD      Portions of the record may have been created with voice recognition software  Occasional wrong word or "sound a like" substitutions may have occurred due to the inherent limitations of voice recognition software    Read the chart carefully and recognize, using context, where substitutions have occurred

## 2021-01-15 NOTE — RESPIRATORY THERAPY NOTE
RT Ventilator Management Note  David Covington 56 y o  male MRN: [id number]  Unit/Bed#: [facility] 11 Encounter: [id number]      Daily Screen       1/15/2021  0419 1/15/2021  0737          Patient safety screen outcome[de-identified]  Failed  Failed      Not Ready for Weaning due to[de-identified]  FiO2 >60%;PEEP > 8cmH2O  FiO2 >60%              Physical Exam:   Assessment Type: (P) Assess only  General Appearance: (P) Eyes open/responds to stimulus  Respiratory Pattern: (P) Assisted  Chest Assessment: (P) Chest expansion symmetrical  Bilateral Breath Sounds: (P) Rhonchi  Suction: (P) ET Tube      Resp Comments: (P) PC reduced per MD orders  Pt appears agitated and tachypnic  Strong cough noted when suction performed  No other changes at this time

## 2021-01-15 NOTE — OCCUPATIONAL THERAPY NOTE
OT CANCEL NOTE    OT orders received  Chart reviewed  Pt is currently intubated/sedated and not appropriate to engage in skilled OT services at this time  Will hold initial OT evaluation  Will continue to follow pt on caseload and see pt when medically stable and as clinically appropriate         01/15/21 1353   Note Type   Cancel Reasons Intubated/sedated       Jaylan Owen MS, OTR/L

## 2021-01-16 ENCOUNTER — APPOINTMENT (INPATIENT)
Dept: NON INVASIVE DIAGNOSTICS | Facility: HOSPITAL | Age: 61
DRG: 853 | End: 2021-01-16
Payer: COMMERCIAL

## 2021-01-16 LAB
ABO GROUP BLD BPU: NORMAL
ALBUMIN SERPL BCP-MCNC: 1.6 G/DL (ref 3.5–5)
ALP SERPL-CCNC: 104 U/L (ref 46–116)
ALT SERPL W P-5'-P-CCNC: 82 U/L (ref 12–78)
ANION GAP SERPL CALCULATED.3IONS-SCNC: 5 MMOL/L (ref 4–13)
ANION GAP SERPL CALCULATED.3IONS-SCNC: 5 MMOL/L (ref 4–13)
ANION GAP SERPL CALCULATED.3IONS-SCNC: 6 MMOL/L (ref 4–13)
APTT PPP: 56 SECONDS (ref 23–37)
APTT PPP: 65 SECONDS (ref 23–37)
APTT PPP: 82 SECONDS (ref 23–37)
AST SERPL W P-5'-P-CCNC: 139 U/L (ref 5–45)
BACTERIA BLD CULT: NORMAL
BACTERIA BLD CULT: NORMAL
BASE EXCESS BLDA CALC-SCNC: -1.5 MMOL/L
BASE EXCESS BLDA CALC-SCNC: -1.7 MMOL/L
BASE EXCESS BLDA CALC-SCNC: -2.5 MMOL/L
BASOPHILS # BLD AUTO: 0.01 THOUSANDS/ΜL (ref 0–0.1)
BASOPHILS # BLD AUTO: 0.02 THOUSANDS/ΜL (ref 0–0.1)
BASOPHILS NFR BLD AUTO: 0 % (ref 0–1)
BASOPHILS NFR BLD AUTO: 0 % (ref 0–1)
BILIRUB SERPL-MCNC: 0.73 MG/DL (ref 0.2–1)
BPU ID: NORMAL
BUN SERPL-MCNC: 20 MG/DL (ref 5–25)
BUN SERPL-MCNC: 21 MG/DL (ref 5–25)
BUN SERPL-MCNC: 22 MG/DL (ref 5–25)
CALCIUM ALBUM COR SERPL-MCNC: 9 MG/DL (ref 8.3–10.1)
CALCIUM SERPL-MCNC: 7.1 MG/DL (ref 8.3–10.1)
CALCIUM SERPL-MCNC: 7.3 MG/DL (ref 8.3–10.1)
CALCIUM SERPL-MCNC: 7.3 MG/DL (ref 8.3–10.1)
CHLORIDE SERPL-SCNC: 115 MMOL/L (ref 100–108)
CHLORIDE SERPL-SCNC: 119 MMOL/L (ref 100–108)
CHLORIDE SERPL-SCNC: 119 MMOL/L (ref 100–108)
CK MB SERPL-MCNC: 12.4 NG/ML (ref 0–5)
CK MB SERPL-MCNC: 20.3 NG/ML (ref 0–5)
CK MB SERPL-MCNC: 23.4 NG/ML (ref 0–5)
CK MB SERPL-MCNC: <1 % (ref 0–2.5)
CK SERPL-CCNC: 3550 U/L (ref 39–308)
CK SERPL-CCNC: 4771 U/L (ref 39–308)
CK SERPL-CCNC: 5221 U/L (ref 39–308)
CO2 SERPL-SCNC: 23 MMOL/L (ref 21–32)
CO2 SERPL-SCNC: 23 MMOL/L (ref 21–32)
CO2 SERPL-SCNC: 24 MMOL/L (ref 21–32)
CREAT SERPL-MCNC: 0.65 MG/DL (ref 0.6–1.3)
CREAT SERPL-MCNC: 0.66 MG/DL (ref 0.6–1.3)
CREAT SERPL-MCNC: 0.78 MG/DL (ref 0.6–1.3)
CROSSMATCH: NORMAL
EOSINOPHIL # BLD AUTO: 0 THOUSAND/ΜL (ref 0–0.61)
EOSINOPHIL # BLD AUTO: 0 THOUSAND/ΜL (ref 0–0.61)
EOSINOPHIL NFR BLD AUTO: 0 % (ref 0–6)
EOSINOPHIL NFR BLD AUTO: 0 % (ref 0–6)
ERYTHROCYTE [DISTWIDTH] IN BLOOD BY AUTOMATED COUNT: 13.9 % (ref 11.6–15.1)
ERYTHROCYTE [DISTWIDTH] IN BLOOD BY AUTOMATED COUNT: 14 % (ref 11.6–15.1)
ERYTHROCYTE [DISTWIDTH] IN BLOOD BY AUTOMATED COUNT: 14.1 % (ref 11.6–15.1)
GFR SERPL CREATININE-BSD FRML MDRD: 104 ML/MIN/1.73SQ M
GFR SERPL CREATININE-BSD FRML MDRD: 104 ML/MIN/1.73SQ M
GFR SERPL CREATININE-BSD FRML MDRD: 97 ML/MIN/1.73SQ M
GLUCOSE SERPL-MCNC: 131 MG/DL (ref 65–140)
GLUCOSE SERPL-MCNC: 133 MG/DL (ref 65–140)
GLUCOSE SERPL-MCNC: 139 MG/DL (ref 65–140)
GLUCOSE SERPL-MCNC: 143 MG/DL (ref 65–140)
GLUCOSE SERPL-MCNC: 145 MG/DL (ref 65–140)
GLUCOSE SERPL-MCNC: 145 MG/DL (ref 65–140)
GLUCOSE SERPL-MCNC: 150 MG/DL (ref 65–140)
GLUCOSE SERPL-MCNC: 159 MG/DL (ref 65–140)
GLUCOSE SERPL-MCNC: 160 MG/DL (ref 65–140)
GLUCOSE SERPL-MCNC: 162 MG/DL (ref 65–140)
GLUCOSE SERPL-MCNC: 163 MG/DL (ref 65–140)
GLUCOSE SERPL-MCNC: 175 MG/DL (ref 65–140)
GLUCOSE SERPL-MCNC: 180 MG/DL (ref 65–140)
GLUCOSE SERPL-MCNC: 180 MG/DL (ref 65–140)
GLUCOSE SERPL-MCNC: 184 MG/DL (ref 65–140)
HCO3 BLDA-SCNC: 20 MMOL/L (ref 22–28)
HCO3 BLDA-SCNC: 20.7 MMOL/L (ref 22–28)
HCO3 BLDA-SCNC: 20.9 MMOL/L (ref 22–28)
HCT VFR BLD AUTO: 23.8 % (ref 36.5–49.3)
HCT VFR BLD AUTO: 24.1 % (ref 36.5–49.3)
HGB BLD-MCNC: 7.9 G/DL (ref 12–17)
HGB BLD-MCNC: 8 G/DL (ref 12–17)
HGB BLD-MCNC: 8.1 G/DL (ref 12–17)
HGB BLD-MCNC: 8.1 G/DL (ref 12–17)
IMM GRANULOCYTES # BLD AUTO: 0.19 THOUSAND/UL (ref 0–0.2)
IMM GRANULOCYTES # BLD AUTO: 0.22 THOUSAND/UL (ref 0–0.2)
IMM GRANULOCYTES NFR BLD AUTO: 2 % (ref 0–2)
IMM GRANULOCYTES NFR BLD AUTO: 2 % (ref 0–2)
LYMPHOCYTES # BLD AUTO: 0.43 THOUSANDS/ΜL (ref 0.6–4.47)
LYMPHOCYTES # BLD AUTO: 0.44 THOUSANDS/ΜL (ref 0.6–4.47)
LYMPHOCYTES NFR BLD AUTO: 4 % (ref 14–44)
LYMPHOCYTES NFR BLD AUTO: 4 % (ref 14–44)
MAGNESIUM SERPL-MCNC: 1.8 MG/DL (ref 1.6–2.6)
MAGNESIUM SERPL-MCNC: 2 MG/DL (ref 1.6–2.6)
MCH RBC QN AUTO: 29.3 PG (ref 26.8–34.3)
MCH RBC QN AUTO: 29.3 PG (ref 26.8–34.3)
MCH RBC QN AUTO: 29.4 PG (ref 26.8–34.3)
MCHC RBC AUTO-ENTMCNC: 33.2 G/DL (ref 31.4–37.4)
MCHC RBC AUTO-ENTMCNC: 33.2 G/DL (ref 31.4–37.4)
MCHC RBC AUTO-ENTMCNC: 33.6 G/DL (ref 31.4–37.4)
MCV RBC AUTO: 87 FL (ref 82–98)
MCV RBC AUTO: 88 FL (ref 82–98)
MCV RBC AUTO: 89 FL (ref 82–98)
MONOCYTES # BLD AUTO: 0.68 THOUSAND/ΜL (ref 0.17–1.22)
MONOCYTES # BLD AUTO: 1.03 THOUSAND/ΜL (ref 0.17–1.22)
MONOCYTES NFR BLD AUTO: 10 % (ref 4–12)
MONOCYTES NFR BLD AUTO: 7 % (ref 4–12)
NEUTROPHILS # BLD AUTO: 8.91 THOUSANDS/ΜL (ref 1.85–7.62)
NEUTROPHILS # BLD AUTO: 8.96 THOUSANDS/ΜL (ref 1.85–7.62)
NEUTS SEG NFR BLD AUTO: 84 % (ref 43–75)
NEUTS SEG NFR BLD AUTO: 87 % (ref 43–75)
NRBC BLD AUTO-RTO: 0 /100 WBCS
NRBC BLD AUTO-RTO: 1 /100 WBCS
NRBC BLD AUTO-RTO: 1 /100 WBCS
O2 CT BLDA-SCNC: 10.9 ML/DL (ref 16–23)
O2 CT BLDA-SCNC: 11.3 ML/DL (ref 16–23)
O2 CT BLDA-SCNC: 11.7 ML/DL (ref 16–23)
OXYHGB MFR BLDA: 92.5 % (ref 94–97)
OXYHGB MFR BLDA: 94.1 % (ref 94–97)
OXYHGB MFR BLDA: 94.1 % (ref 94–97)
PCO2 BLDA: 26.3 MM HG (ref 36–44)
PCO2 BLDA: 26.3 MM HG (ref 36–44)
PCO2 BLDA: 26.7 MM HG (ref 36–44)
PH BLDA: 7.5 [PH] (ref 7.35–7.45)
PH BLDA: 7.51 [PH] (ref 7.35–7.45)
PH BLDA: 7.51 [PH] (ref 7.35–7.45)
PHOSPHATE SERPL-MCNC: 2 MG/DL (ref 2.3–4.1)
PHOSPHATE SERPL-MCNC: 2.3 MG/DL (ref 2.3–4.1)
PLATELET # BLD AUTO: 60 THOUSANDS/UL (ref 149–390)
PLATELET # BLD AUTO: 61 THOUSANDS/UL (ref 149–390)
PLATELET # BLD AUTO: 76 THOUSANDS/UL (ref 149–390)
PMV BLD AUTO: 11.8 FL (ref 8.9–12.7)
PMV BLD AUTO: 12.2 FL (ref 8.9–12.7)
PMV BLD AUTO: 12.2 FL (ref 8.9–12.7)
PO2 BLDA: 61.5 MM HG (ref 75–129)
PO2 BLDA: 69.6 MM HG (ref 75–129)
PO2 BLDA: 70.9 MM HG (ref 75–129)
POTASSIUM SERPL-SCNC: 3.7 MMOL/L (ref 3.5–5.3)
POTASSIUM SERPL-SCNC: 3.9 MMOL/L (ref 3.5–5.3)
POTASSIUM SERPL-SCNC: 4.1 MMOL/L (ref 3.5–5.3)
PROT SERPL-MCNC: 5.5 G/DL (ref 6.4–8.2)
PS CM H2O: 5
PS VENT FIO2: 50
PS VENT FIO2: 50
PS VENT PEEP: 6
PS VENT PEEP: 6
RBC # BLD AUTO: 2.69 MILLION/UL (ref 3.88–5.62)
RBC # BLD AUTO: 2.73 MILLION/UL (ref 3.88–5.62)
RBC # BLD AUTO: 2.76 MILLION/UL (ref 3.88–5.62)
SODIUM SERPL-SCNC: 145 MMOL/L (ref 136–145)
SODIUM SERPL-SCNC: 147 MMOL/L (ref 136–145)
SODIUM SERPL-SCNC: 147 MMOL/L (ref 136–145)
SPECIMEN SOURCE: ABNORMAL
UNIT DISPENSE STATUS: NORMAL
UNIT PRODUCT CODE: NORMAL
UNIT RH: NORMAL
VANCOMYCIN TROUGH SERPL-MCNC: 23 UG/ML (ref 10–20)
VENT - PS: ABNORMAL
VENT - PS: ABNORMAL
WBC # BLD AUTO: 10.23 THOUSAND/UL (ref 4.31–10.16)
WBC # BLD AUTO: 10.66 THOUSAND/UL (ref 4.31–10.16)
WBC # BLD AUTO: 10.93 THOUSAND/UL (ref 4.31–10.16)

## 2021-01-16 PROCEDURE — 93970 EXTREMITY STUDY: CPT

## 2021-01-16 PROCEDURE — 85025 COMPLETE CBC W/AUTO DIFF WBC: CPT | Performed by: EMERGENCY MEDICINE

## 2021-01-16 PROCEDURE — 82805 BLOOD GASES W/O2 SATURATION: CPT | Performed by: NURSE PRACTITIONER

## 2021-01-16 PROCEDURE — 94760 N-INVAS EAR/PLS OXIMETRY 1: CPT

## 2021-01-16 PROCEDURE — 80048 BASIC METABOLIC PNL TOTAL CA: CPT | Performed by: EMERGENCY MEDICINE

## 2021-01-16 PROCEDURE — 99291 CRITICAL CARE FIRST HOUR: CPT | Performed by: INTERNAL MEDICINE

## 2021-01-16 PROCEDURE — 82553 CREATINE MB FRACTION: CPT | Performed by: EMERGENCY MEDICINE

## 2021-01-16 PROCEDURE — 82550 ASSAY OF CK (CPK): CPT | Performed by: NURSE PRACTITIONER

## 2021-01-16 PROCEDURE — 85014 HEMATOCRIT: CPT | Performed by: EMERGENCY MEDICINE

## 2021-01-16 PROCEDURE — 82550 ASSAY OF CK (CPK): CPT | Performed by: EMERGENCY MEDICINE

## 2021-01-16 PROCEDURE — 85730 THROMBOPLASTIN TIME PARTIAL: CPT | Performed by: INTERNAL MEDICINE

## 2021-01-16 PROCEDURE — 80053 COMPREHEN METABOLIC PANEL: CPT | Performed by: EMERGENCY MEDICINE

## 2021-01-16 PROCEDURE — 83735 ASSAY OF MAGNESIUM: CPT | Performed by: EMERGENCY MEDICINE

## 2021-01-16 PROCEDURE — 94003 VENT MGMT INPAT SUBQ DAY: CPT

## 2021-01-16 PROCEDURE — 82553 CREATINE MB FRACTION: CPT | Performed by: NURSE PRACTITIONER

## 2021-01-16 PROCEDURE — 82948 REAGENT STRIP/BLOOD GLUCOSE: CPT

## 2021-01-16 PROCEDURE — 84100 ASSAY OF PHOSPHORUS: CPT | Performed by: EMERGENCY MEDICINE

## 2021-01-16 PROCEDURE — 80202 ASSAY OF VANCOMYCIN: CPT | Performed by: INTERNAL MEDICINE

## 2021-01-16 PROCEDURE — 30233N1 TRANSFUSION OF NONAUTOLOGOUS RED BLOOD CELLS INTO PERIPHERAL VEIN, PERCUTANEOUS APPROACH: ICD-10-PCS | Performed by: INTERNAL MEDICINE

## 2021-01-16 PROCEDURE — 85018 HEMOGLOBIN: CPT | Performed by: EMERGENCY MEDICINE

## 2021-01-16 PROCEDURE — 85027 COMPLETE CBC AUTOMATED: CPT | Performed by: EMERGENCY MEDICINE

## 2021-01-16 PROCEDURE — C9113 INJ PANTOPRAZOLE SODIUM, VIA: HCPCS | Performed by: EMERGENCY MEDICINE

## 2021-01-16 PROCEDURE — 94640 AIRWAY INHALATION TREATMENT: CPT

## 2021-01-16 PROCEDURE — 82805 BLOOD GASES W/O2 SATURATION: CPT | Performed by: EMERGENCY MEDICINE

## 2021-01-16 RX ORDER — HYDROMORPHONE HCL/PF 1 MG/ML
2 SYRINGE (ML) INJECTION EVERY 2 HOUR PRN
Status: DISCONTINUED | OUTPATIENT
Start: 2021-01-16 | End: 2021-01-18

## 2021-01-16 RX ORDER — HYDROMORPHONE HCL/PF 1 MG/ML
1 SYRINGE (ML) INJECTION EVERY 2 HOUR PRN
Status: DISCONTINUED | OUTPATIENT
Start: 2021-01-16 | End: 2021-01-16

## 2021-01-16 RX ORDER — HYDROMORPHONE HCL/PF 1 MG/ML
1 SYRINGE (ML) INJECTION ONCE
Status: COMPLETED | OUTPATIENT
Start: 2021-01-16 | End: 2021-01-16

## 2021-01-16 RX ORDER — FUROSEMIDE 10 MG/ML
20 INJECTION INTRAMUSCULAR; INTRAVENOUS ONCE
Status: COMPLETED | OUTPATIENT
Start: 2021-01-16 | End: 2021-01-16

## 2021-01-16 RX ORDER — HYDROMORPHONE HCL/PF 1 MG/ML
2 SYRINGE (ML) INJECTION EVERY 2 HOUR PRN
Status: DISCONTINUED | OUTPATIENT
Start: 2021-01-16 | End: 2021-01-16

## 2021-01-16 RX ORDER — NOREPINEPHRINE BITARTRATE 1 MG/ML
INJECTION, SOLUTION INTRAVENOUS
Status: DISPENSED
Start: 2021-01-16 | End: 2021-01-17

## 2021-01-16 RX ADMIN — PROPOFOL 35 MCG/KG/MIN: 10 INJECTION, EMULSION INTRAVENOUS at 07:51

## 2021-01-16 RX ADMIN — LEVALBUTEROL HYDROCHLORIDE 1.25 MG: 1.25 SOLUTION, CONCENTRATE RESPIRATORY (INHALATION) at 19:16

## 2021-01-16 RX ADMIN — SODIUM CHLORIDE SOLN NEBU 3% 4 ML: 3 NEBU SOLN at 19:15

## 2021-01-16 RX ADMIN — PANTOPRAZOLE SODIUM 40 MG: 40 INJECTION, POWDER, FOR SOLUTION INTRAVENOUS at 20:55

## 2021-01-16 RX ADMIN — HYDROMORPHONE HYDROCHLORIDE 1 MG: 1 INJECTION, SOLUTION INTRAMUSCULAR; INTRAVENOUS; SUBCUTANEOUS at 14:19

## 2021-01-16 RX ADMIN — FUROSEMIDE 20 MG: 10 INJECTION, SOLUTION INTRAMUSCULAR; INTRAVENOUS at 16:35

## 2021-01-16 RX ADMIN — ATORVASTATIN CALCIUM 40 MG: 40 TABLET, FILM COATED ORAL at 21:51

## 2021-01-16 RX ADMIN — SODIUM CHLORIDE 9 UNITS/HR: 9 INJECTION, SOLUTION INTRAVENOUS at 12:01

## 2021-01-16 RX ADMIN — PROPOFOL 35 MCG/KG/MIN: 10 INJECTION, EMULSION INTRAVENOUS at 13:16

## 2021-01-16 RX ADMIN — DEXAMETHASONE SODIUM PHOSPHATE 7.3 MG: 10 INJECTION, SOLUTION INTRAMUSCULAR; INTRAVENOUS at 08:03

## 2021-01-16 RX ADMIN — HYDROMORPHONE HYDROCHLORIDE 2 MG: 1 INJECTION, SOLUTION INTRAMUSCULAR; INTRAVENOUS; SUBCUTANEOUS at 15:56

## 2021-01-16 RX ADMIN — SENNOSIDES 17.2 MG: 8.6 TABLET, FILM COATED ORAL at 17:29

## 2021-01-16 RX ADMIN — KETAMINE HYDROCHLORIDE 0.5 MG/KG/HR: 50 INJECTION INTRAMUSCULAR; INTRAVENOUS at 18:18

## 2021-01-16 RX ADMIN — LEVALBUTEROL HYDROCHLORIDE 1.25 MG: 1.25 SOLUTION, CONCENTRATE RESPIRATORY (INHALATION) at 13:28

## 2021-01-16 RX ADMIN — DEXAMETHASONE SODIUM PHOSPHATE 7.3 MG: 10 INJECTION, SOLUTION INTRAMUSCULAR; INTRAVENOUS at 20:55

## 2021-01-16 RX ADMIN — LEVALBUTEROL HYDROCHLORIDE 1.25 MG: 1.25 SOLUTION, CONCENTRATE RESPIRATORY (INHALATION) at 00:35

## 2021-01-16 RX ADMIN — POTASSIUM PHOSPHATE, MONOBASIC AND POTASSIUM PHOSPHATE, DIBASIC 12 MMOL: 224; 236 INJECTION, SOLUTION, CONCENTRATE INTRAVENOUS at 09:50

## 2021-01-16 RX ADMIN — CEFEPIME HYDROCHLORIDE 2000 MG: 2 INJECTION, POWDER, FOR SOLUTION INTRAVENOUS at 21:24

## 2021-01-16 RX ADMIN — SODIUM CHLORIDE SOLN NEBU 3% 4 ML: 3 NEBU SOLN at 13:27

## 2021-01-16 RX ADMIN — POLYETHYLENE GLYCOL 3350 17 G: 17 POWDER, FOR SOLUTION ORAL at 08:03

## 2021-01-16 RX ADMIN — HYDROMORPHONE HYDROCHLORIDE 1 MG: 1 INJECTION, SOLUTION INTRAMUSCULAR; INTRAVENOUS; SUBCUTANEOUS at 13:44

## 2021-01-16 RX ADMIN — PROPOFOL 35 MCG/KG/MIN: 10 INJECTION, EMULSION INTRAVENOUS at 19:30

## 2021-01-16 RX ADMIN — HEPARIN SODIUM 12 UNITS/KG/HR: 10000 INJECTION, SOLUTION INTRAVENOUS at 05:55

## 2021-01-16 RX ADMIN — SODIUM CHLORIDE SOLN NEBU 3% 4 ML: 3 NEBU SOLN at 08:44

## 2021-01-16 RX ADMIN — OXYCODONE HYDROCHLORIDE AND ACETAMINOPHEN 1000 MG: 500 TABLET ORAL at 21:51

## 2021-01-16 RX ADMIN — SENNOSIDES 17.2 MG: 8.6 TABLET, FILM COATED ORAL at 08:01

## 2021-01-16 RX ADMIN — HYDROMORPHONE HYDROCHLORIDE 1 MG: 1 INJECTION, SOLUTION INTRAMUSCULAR; INTRAVENOUS; SUBCUTANEOUS at 06:20

## 2021-01-16 RX ADMIN — CHLORHEXIDINE GLUCONATE 0.12% ORAL RINSE 15 ML: 1.2 LIQUID ORAL at 08:00

## 2021-01-16 RX ADMIN — KETAMINE HYDROCHLORIDE 0.5 MG/KG/HR: 50 INJECTION INTRAMUSCULAR; INTRAVENOUS at 04:48

## 2021-01-16 RX ADMIN — SODIUM CHLORIDE SOLN NEBU 3% 4 ML: 3 NEBU SOLN at 00:35

## 2021-01-16 RX ADMIN — Medication 2000 UNITS: at 08:00

## 2021-01-16 RX ADMIN — CHLORHEXIDINE GLUCONATE 0.12% ORAL RINSE 15 ML: 1.2 LIQUID ORAL at 20:55

## 2021-01-16 RX ADMIN — PROPOFOL 35 MCG/KG/MIN: 10 INJECTION, EMULSION INTRAVENOUS at 02:46

## 2021-01-16 RX ADMIN — OXYCODONE HYDROCHLORIDE AND ACETAMINOPHEN 1000 MG: 500 TABLET ORAL at 08:00

## 2021-01-16 RX ADMIN — CEFEPIME HYDROCHLORIDE 2000 MG: 2 INJECTION, POWDER, FOR SOLUTION INTRAVENOUS at 08:06

## 2021-01-16 RX ADMIN — Medication 2 MG/HR: at 01:05

## 2021-01-16 RX ADMIN — PANTOPRAZOLE SODIUM 40 MG: 40 INJECTION, POWDER, FOR SOLUTION INTRAVENOUS at 08:01

## 2021-01-16 RX ADMIN — HEPARIN SODIUM 3000 UNITS: 1000 INJECTION INTRAVENOUS; SUBCUTANEOUS at 05:34

## 2021-01-16 RX ADMIN — LEVALBUTEROL HYDROCHLORIDE 1.25 MG: 1.25 SOLUTION, CONCENTRATE RESPIRATORY (INHALATION) at 08:44

## 2021-01-16 RX ADMIN — ZINC SULFATE 220 MG (50 MG) CAPSULE 220 MG: CAPSULE at 08:00

## 2021-01-16 RX ADMIN — FUROSEMIDE 20 MG: 10 INJECTION, SOLUTION INTRAMUSCULAR; INTRAVENOUS at 10:05

## 2021-01-16 RX ADMIN — MIDAZOLAM 1 MG: 1 INJECTION INTRAMUSCULAR; INTRAVENOUS at 22:03

## 2021-01-16 NOTE — PROGRESS NOTES
Daily Progress Note - Angeline theodore Carmen Moss 14[address] 56 y o  male MRN: [id number]  Unit/Bed#: MICU 11 Encounter: [id number]        ----------------------------------------------------------------------------------------  HPI/24hr events:     Patient remains intubated and sedated  Vent day 5  Abx day 5  Patient's hgb fell below 6 8: he was transfused 1 unit pRBCs overnight  It stabilized at 8 1  200mL free water flushes q6hr were started due to increasing Na  Patient remained off of NE gtt  No nursing concerns overnight  Patient did not make a bowel movement  Patient remained afebrile overnight  His tachycardia resolved and his heart rate remained in the 70s to low 100s range  Patient's tachypnea also improved and he is now breathing at a rate of 16-22  His MAPs remained in the 70s and his O2 concentration hovered in the low to mid 90s  Lab wise, patient's hypernatremia is improving  MRSA negative  Creatinine stable  CK continues to trend downwards  No leukocytosis  Hgb stable at 8 1  Platelets hovering in the 50s to 60s  Glucose at goal   PCO2 remains depressed at 26 3 and pH 7 512  Pure acute respiratory alkalosis  K, 3 9, Mg 2 1, phos 2 3    PCMV, 16, 8, 6, 50      ---------------------------------------------------------------------------------------  SUBJECTIVE  Patient unable to provide subjective history  Review of Systems   Unable to perform ROS: Intubated     Review of systems was unable to be performed secondary to intubation  ---------------------------------------------------------------------------------------  Assessment and Plan:    Neuro:   · Diagnosis: Encephalopathy  ? Plan:  ICU care, pain and sedation  ? Goal RASS -2 to 0  § Continue sedation with dilaudid, ketamine, propofol  ? CAM-ICU  ? Sleep-Wake Cycle Regulation     CV:   · Diagnosis:  Aortic thrombus,  history of hypertension with hypotension (resolved)  ?  Plan:   ? Goal map greater than 65  § Hold home BP meds  § Levophed gtt prn  ? Continue to closely monitor hemodynamics  ? Closely monitor on tele  ? Continue heparin drip  ? Vascular surgery following     Pulm:  · Diagnosis:  Acute COVID-19 infection, acute hypoxic respiratory failure, abnormal CXR, Pulmonary embolism  ? Plan:   ? Patient intubated on 01/12 due to hypoxia  ? Continue zinc, vitamin-D, vitamin-C, statin  § Consider pressure support  ? Severe COVID-19 pathway  ? Remdesivir not given  ? Good pulm hygene  ? Frequent suctioning  ? PCO2 improving with increasing sedation and lower vent pressures  § Pure acute respiratory alkalosis  § Optimize sedation/analgesia  § Consider adjusting vent settings  § Trend ABG        GI:   · Diagnosis: GERD, Suspected Upper GI bleed, Hepatitis C  ? Plan:   ? No evidence of active bleeding  ? Continue protonix, chane to BID from gtt  ? Contact GI and endoscopy if blood in the OG-tube or signs and symptoms of GI bleeding  ? Q6hour CBCs  ? Bowel regimen        :   · Diagnosis:  Gross hematuria, ANNALEE that has resolved, UTI, likely myoglobinuria  ? Plan:   ? Moderate bacteria on urine microscopy on presentation  ? Continue antibiotics  ? Trend BMP q6hr  ? Per Urology, maintain Veras catheter for 7 days (1/19)  ? Monitor for bleeding  ? Strict I&Os   ? Creatinine stable  ? Urine output a fraction of a mL below goal  ? Consider increasing rate of isolyte gtt         F/E/N:   · Plan:   · Fluids:  Isolyte gtt  · Electrolytes: Trend and replete as needed  · Nutrition:   Continue tube feedings        Heme/Onc:   · Diagnosis:  Thrombocytopenia  ? Plan:  ? Predated initiation of heparin  ? Peripheral smear  ? No schistocytes noted on peripheral/hemolysis smear; however, poikilocytes, tear drop cells, linda cells, anisocytosis present  ? [id number] CBC        Endo:   · Diagnosis: DMII, DKA - now resolved  ?  Plan:   § Continue ISS   § Maintain BS between 140-180        ID:   · Diagnosis:  Tachycardia, tachynea improved with increasing sedation  ? Plan:   ? Continue to closely monitor for signs of infection  ? Trend daily fever curve and WBC  ? Consider d/c cefepime, vancomycin     MSK/Skin:   · Diagnosis: Acute Limb Ischemia, Rhabdomyolysis  ? Plan:   ? S/p FIDELINA AKA guillotine amputation  § Per vascular surgery, do not remove stump dressing  Formalization tentatively planned for 1/18  § POD 2  ? Continue to turn and reposition frequently  ? Continue pressure off-loading  ? PT/OT  ? UOP just below goal, creatinine stable, CK continues to trend down  ? Ultrasound right upper extremity  Vascular made aware of blistering  Disposition: Continue Critical Care   Code Status: Level 1 - Full Code  ---------------------------------------------------------------------------------------  ICU CORE MEASURES    Prophylaxis   VTE Pharmacologic Prophylaxis: Heparin Drip  VTE Mechanical Prophylaxis: sequential compression device  Stress Ulcer Prophylaxis: Pantoprazole IV     ABCDE Protocol (if indicated)  Plan to perform spontaneous awakening trial today? No  Plan to perform spontaneous breathing trial today? No  Obvious barriers to extubation? Yes  CAM-ICU: Negative    Invasive Devices Review  Invasive Devices     Central Venous Catheter Line            CVC Central Lines 01/12/21 Triple Right 3 days          Peripheral Intravenous Line            Peripheral IV 01/15/21 Left Antecubital 1 day          Arterial Line            Arterial Line 01/12/21 Radial 3 days          Drain            Urethral Catheter 18 Fr  -- days    NG/OG/Enteral Tube Orogastric 16 Fr 4 days          Airway            ETT  Oral;Hi-Lo; Cuffed 7 5 mm 4 days              Can any invasive devices be discontinued today?  No  ---------------------------------------------------------------------------------------  OBJECTIVE    Vitals   Vitals:    01/16/21 0700 01/16/21 0715 01/16/21 0730 01/16/21 0745   BP:       BP Location:       Pulse: 86 86 82 84   Resp: 15 18 (!) 25 21   Temp: 98 6 °F (37 °C) 98 2 °F (36 8 °C) 98 2 °F (36 8 °C) 97 9 °F (36 6 °C)   TempSrc:       SpO2: 95% 95% 94% 96%   Weight:       Height:         Temp (24hrs), Av 6 °F (37 °C), Min:96 1 °F (35 6 °C), Max:99 3 °F (37 4 °C)  Current: Temperature: 97 9 °F (36 6 °C)  HR: 92  BP: 120/48  RR: 20  SpO2: 94    Respiratory:  SpO2: SpO2: 96 %       Invasive/non-invasive ventilation settings   Respiratory    Lab Data (Last 4 hours)       0743            pH, Arterial       7 513           pCO2, Arterial       26 3           pO2, Arterial       70 9           HCO3, Arterial       20 7           Base Excess, Arterial       -1 7                O2/Vent Data     None                Physical Exam  Vitals signs reviewed  Constitutional:       Appearance: He is ill-appearing  HENT:      Head: Normocephalic  Right Ear: External ear normal       Left Ear: External ear normal       Nose: Nose normal       Mouth/Throat:      Mouth: Mucous membranes are moist    Eyes:      Pupils: Pupils are equal, round, and reactive to light  Neck:      Musculoskeletal: Neck supple  Cardiovascular:      Rate and Rhythm: Normal rate  Pulses: Normal pulses  Heart sounds: No murmur  No friction rub  No gallop  Pulmonary:      Effort: No respiratory distress  Breath sounds: No stridor  No wheezing, rhonchi or rales  Comments: On ventilator  Abdominal:      Palpations: Abdomen is soft  Musculoskeletal:         General: Swelling present  Comments: Patient is edematous right upper extremity now starting to blister  At the distal aspect of his extremity remains warm to the touch  Pulses are 2+  Patient can give a thumbs up with this extremity  Skin:     General: Skin is warm and dry  Capillary Refill: Capillary refill takes less than 2 seconds  Findings: Bruising (Right upper extremity) present  Comments: Cool to touch amputation site, mild weeping   Neurological:      Mental Status: He is alert  Comments: With propofol off, patient opens eyes on command, gives a thumbs up with his right upper extremity, and wiggles his left lower extremity toes  He does not give a thumbs-up with his left upper extremity  Psychiatric:      Comments: Unable to assess         Laboratory and Diagnostics:  Results from last 7 days   Lab Units 01/16/21  0437 01/16/21  0109 01/15/21  2002 01/15/21  1723 01/15/21  0557 01/14/21  2359 01/14/21  1745 01/14/21  1154  01/13/21  1611 01/13/21  0440  01/12/21  0618 01/11/21  1020   WBC Thousand/uL 10 23*  --  8 90 9 54 13 61* 8 46 11 59* 10 50*   < > 7 65 6 71   < > 11 76* 20 20*   HEMOGLOBIN g/dL 8 1* 8 1* 6 9* 6 8* 7 5* 7 8* 8 3* 8 3*   < > 9 1* 9 7*   < > 12 9 15 6   I STAT HEMOGLOBIN   --   --   --   --   --   --   --   --    < >  --   --    < >  --   --    HEMATOCRIT % 24 1* 24 1* 20 6* 20 6* 22 5* 23 5* 25 5* 25 2*   < > 26 5* 29 2*   < > 37 5 48 3*   HEMATOCRIT, ISTAT   --   --   --   --   --   --   --   --    < >  --   --    < >  --   --    PLATELETS Thousands/uL 61*  --  55* 58* 75* 40* 53* 38*   < > 34* 44*   < > 108* 237   NEUTROS PCT % 87*  --   --  90*  --   --   --   --   --   --   --   --  91*  --    BANDS PCT %  --   --   --   --   --   --   --   --   --  14* 23*  --   --   --    MONOS PCT % 7  --   --  6  --   --   --   --   --   --   --   --  3*  --    MONO PCT %  --   --   --   --   --  1*  --   --   --  2* 3*  --   --  7    < > = values in this interval not displayed       Results from last 7 days   Lab Units 01/16/21  0743 01/16/21  0437 01/15/21  2002 01/15/21  1723 01/15/21  0557 01/14/21  2359 01/14/21  1745  01/13/21  0440  01/11/21  1422   SODIUM mmol/L 147* 147* 149* 150* 146* 146* 150*   < > 151*  151*   < > 150*   POTASSIUM mmol/L 4 1 3 9 4 1 4 0 3 9 4 3 4 9   < > 4 6  4 6   < > 3 3*   CHLORIDE mmol/L 119* 119* 120* 121* 118* 117* 121*   < > 122*  120*   < > 111*   CO2 mmol/L 23 23 22 23 23 22 24   < > 21  21   < > 10*   CO2, I-STAT   --   --   --   -- --   --   --    < >  --    < >  --    ANION GAP mmol/L 5 5 7 6 5 7 5   < > 8  10   < > 29*   BUN mg/dL 21 20 19 18 19 18 19   < > 29*  30*   < > 64*   CREATININE mg/dL 0 65 0 66 0 58* 0 58* 0 62 0 61 0 64   < > 0 83  0 75   < > 1 43*   CALCIUM mg/dL 7 3* 7 1* 7 4* 7 5* 6 8* 6 8* 7 1*   < > 7 0*  7 2*   < > 9 2   GLUCOSE RANDOM mg/dL 159* 145* 166* 124 155* 183* 151*   < > 172*  172*   < > 550*   ALT U/L  --  82*  --   --   --   --   --   --  33  --  25   AST U/L  --  139*  --   --   --   --   --   --  77*  --  29   ALK PHOS U/L  --  104  --   --   --   --   --   --  77  --  114   ALBUMIN g/dL  --  1 6*  --   --   --   --   --   --  1 4*  --  2 9*   TOTAL BILIRUBIN mg/dL  --  0 73  --   --   --   --   --   --  0 50  --  0 70    < > = values in this interval not displayed  Results from last 7 days   Lab Units 01/16/21  0743 01/15/21  2002 01/15/21  1723 01/15/21  0557 01/14/21  1154 01/14/21  0408 01/13/21  0840   MAGNESIUM mg/dL 2 0 2 1 2 1 2 4 2 3 2 5 2 1   PHOSPHORUS mg/dL 2 0* 2 3 2 2* 1 3* 1 5* 2 4 2 1*      Results from last 7 days   Lab Units 01/16/21  0437 01/15/21  1959 01/15/21  1232 01/15/21  0557 01/14/21  2356 01/14/21  1032 01/14/21  0421  01/12/21  1831 01/12/21  0327 01/11/21  1020   INR   --   --   --   --  1 44*  --   --   --  1 70* 1 52* 1 38*   PTT seconds 56* 118* 59* 61* 37 105* 168*   < > 34 29 26    < > = values in this interval not displayed        Results from last 7 days   Lab Units 01/11/21  1020   TROPONIN I ng/mL <0 03     Results from last 7 days   Lab Units 01/13/21  2328 01/13/21  1205 01/12/21  1301 01/12/21  1008 01/12/21  0322 01/12/21  0206 01/11/21  2303   LACTIC ACID mmol/L 1 4 1 7 2 4* 2 8* 4 6* 3 3* 4 9*     ABG:  Results from last 7 days   Lab Units 01/16/21  0743   PH ART  7 513*   PCO2 ART mm Hg 26 3*   PO2 ART mm Hg 70 9*   HCO3 ART mmol/L 20 7*   BASE EXC ART mmol/L -1 7   ABG SOURCE  Line, Arterial     VBG:  Results from last 7 days   Lab Units 01/16/21  0743 01/12/21  1407   PH REAL   --   --  7 322   PCO2 REAL mm Hg  --   --  33 3*   PO2 REAL mm Hg  --   --  70 6*   HCO3 REAL mmol/L  --   --  16 8*   BASE EXC REAL mmol/L  --   --  -8 3   ABG SOURCE  Line, Arterial   < >  --     < > = values in this interval not displayed  Results from last 7 days   Lab Units 01/14/21  0408 01/13/21  0440 01/12/21  0638 01/11/21  1151   PROCALCITONIN ng/ml 1 13* 1 64* 0 65* 0 31*       Micro  Results from last 7 days   Lab Units 01/15/21  0621 01/15/21  0620 01/12/21  0640 01/12/21  0206 01/11/21  1937 01/11/21  1926 01/11/21  1020   BLOOD CULTURE  Received in Microbiology Lab  Culture in Progress  Received in Microbiology Lab  Culture in Progress  --   --   --  No Growth After 4 Days  No Growth After 4 Days  No Growth After 4 Days  No Growth After 4 Days  URINE CULTURE   --   --  No Growth <1000 cfu/mL  --   --   --   --    MRSA CULTURE ONLY   --   --   --   --  No Methicillin Resistant Staphlyococcus aureus (MRSA) isolated  --   --    LEGIONELLA URINARY ANTIGEN   --   --   --  Negative  --   --   --    STREP PNEUMONIAE ANTIGEN, URINE   --   --   --  Negative  --   --   --        EKG:   Imaging: CXR I have personally reviewed pertinent reports  Intake and Output  I/O       01/14 0701 - 01/15 0700 01/15 0701 - 01/16 0700    I V  (mL/kg) 5203 9 (61 1) 3326 7 (39 1)    Blood  371    NG/ 280    IV Piggyback 674 7 715    Feedings  359    Total Intake(mL/kg) 6098 6 (71 7) 5051 7 (59 4)    Urine (mL/kg/hr) 3260 (1 6) 2265 (1 1)    Emesis/NG output  0    Total Output 3260 2265    Net +2838 6 +2786 7              UOP: 99 ml/hr     Height and Weights   Height: 5' 6" (167 6 cm)  IBW: 63 8 kg  Body mass index is 29 57 kg/m²    Weight (last 2 days)     Date/Time   Weight    01/16/21 0600   83 1 (183 2)    01/15/21 1440   85 1 (187 61)                Nutrition       Diet Orders   (From admission, onward)             Start     Ordered    01/15/21 1927  Diet Enteral/Parenteral; Tube Feeding No Oral Diet; Glucerna 1 2; Continuous; 56; 200; Water; Every 6 hours  Diet effective now     Comments: Glucerna 1 2 at 10 ml/hr and increase by 10 ml q 4 hrs as tolerated to goal rate of 56 ml/hr to provide qd:1344 ml,1613 Kcal,81 gm PRO,154 gm CHO,81 gm Fat,1082 ml Free H2O,1 3 mg CHO/kg/min  Question Answer Comment   Diet Type Enteral/Parenteral    Enteral/Parenteral Tube Feeding No Oral Diet    Tube Feeding Formula: Glucerna 1 2    Bolus/Cyclic/Continuous Continuous    Tube Feeding Goal Rate (mL/hr): 56    Tube Feeding flush (mL): 200    Water Flush type: Water    Water flush frequency: Every 6 hours    RD to adjust diet per protocol? Yes        01/15/21 1927              TF currently running at 30 ml/hr with a goal of 56 ml/hr   Formula: Glucerna      Active Medications  Scheduled Meds:  Current Facility-Administered Medications   Medication Dose Route Frequency Provider Last Rate    ascorbic acid  1,000 mg Oral Q12H Juan Carlos Mccann MD      atorvastatin  40 mg Oral HS Amber Fitzgerald MD      bisacodyl  10 mg Rectal Daily PRN Lulu Gee MD      cefepime  2,000 mg Intravenous Q12H Amber Fitzgerald MD 2,000 mg (01/16/21 0806)    chlorhexidine  15 mL Swish & Spit Q12H Juan Carlos Mccann MD      cholecalciferol  2,000 Units Oral Daily Amber Fitzgerald MD      dexamethasone  0 1 mg/kg Intravenous Q12H Amber Fitzgerald MD      dexmedetomidine  0 1-1 mcg/kg/hr Intravenous Titrated Amber Fitzgerald MD Stopped (01/15/21 6114)    heparin (porcine)  3-30 Units/kg/hr (Order-Specific) Intravenous Titrated Amber Fitzgerald MD 12 Units/kg/hr (01/16/21 5455)    heparin (porcine)  3,000 Units Intravenous Q1H PRN Amber Fitzgerald MD      heparin (porcine)  6,000 Units Intravenous Q1H PRN Amber Fitzgerald MD      HYDROmorphone  2 mg/hr Intravenous Continuous Lulu Gee MD 2 mg/hr (01/16/21 0105)    HYDROmorphone  1 mg Intravenous Q2H PRN Amber Fitzgerald MD      HYDROmorphone  1 mg Intravenous Q2H PRN Maximino Ludwig MD      insulin regular (HumuLIN R,NovoLIN R) infusion  0 3-21 Units/hr Intravenous Titrated Millicent Spencer MD 6 Units/hr (01/16/21 0745)    ketamine  0 5 mg/kg/hr Intravenous Continuous Maximino Ludwig MD 0 5 mg/kg/hr (01/16/21 0448)    levalbuterol  1 25 mg Nebulization Q6H Millicent Spencer MD      midazolam  1 mg Intravenous Q1H PRN Millicent Spencer MD      multi-electrolyte  50 mL/hr Intravenous Continuous Maximino Ludwig MD 50 mL/hr (01/15/21 1330)    zinc sulfate  220 mg Oral Daily Millicent Spencer MD      Followed by   Enio Dejesus ON 1/19/2021] multivitamin-minerals  1 tablet Oral Daily Millicent Spencer MD      norepinephrine  1-30 mcg/min Intravenous Titrated Millicent Spencer MD Stopped (01/15/21 1446)    oxyCODONE  10 mg Oral Q6H PRN Maximino Ludwig MD      pantoprazole  40 mg Intravenous Q12H [address] Maximino Ludwig MD      polyethylene glycol  17 g Oral Daily Maximino Ludwig MD      potassium phosphate  12 mmol Intravenous Once Maximino Ludwig MD      propofol  5-50 mcg/kg/min Intravenous Titrated Millicent Spencer MD 35 mcg/kg/min (01/16/21 0751)    senna  2 tablet Oral BID Maximino Ludwig MD      sodium chloride  4 mL Nebulization Q6H Millicent Spencer MD      vancomycin  20 mg/kg (Adjusted) Intravenous Q12H Millicent Spencer MD Stopped (01/16/21 0436)     Continuous Infusions:  dexmedetomidine, 0 1-1 mcg/kg/hr, Last Rate: Stopped (01/15/21 0811)  heparin (porcine), 3-30 Units/kg/hr (Order-Specific), Last Rate: 12 Units/kg/hr (01/16/21 0555)  HYDROmorphone, 2 mg/hr, Last Rate: 2 mg/hr (01/16/21 0105)  insulin regular (HumuLIN R,NovoLIN R) infusion, 0 3-21 Units/hr, Last Rate: 6 Units/hr (01/16/21 0745)  ketamine, 0 5 mg/kg/hr, Last Rate: 0 5 mg/kg/hr (01/16/21 9497)  multi-electrolyte, 50 mL/hr, Last Rate: 50 mL/hr (01/15/21 1330)  norepinephrine, 1-30 mcg/min, Last Rate: Stopped (01/15/21 1446)  propofol, 5-50 mcg/kg/min, Last Rate: 35 mcg/kg/min (01/16/21 0751)      PRN Meds:   bisacodyl, 10 mg, Daily PRN  heparin (porcine), 3,000 Units, Q1H PRN  heparin (porcine), 6,000 Units, Q1H PRN  HYDROmorphone, 1 mg, Q2H PRN  HYDROmorphone, 1 mg, Q2H PRN  midazolam, 1 mg, Q1H PRN  oxyCODONE, 10 mg, Q6H PRN        Allergies   Allergies   Allergen Reactions    Varenicline      ---------------------------------------------------------------------------------------  Advance Directive and Living Will:      Power of :    POLST:    ---------------------------------------------------------------------------------------  Care Time Delivered:   No Critical Care time spent     West Mccormick MD      Portions of the record may have been created with voice recognition software  Occasional wrong word or "sound a like" substitutions may have occurred due to the inherent limitations of voice recognition software    Read the chart carefully and recognize, using context, where substitutions have occurred

## 2021-01-16 NOTE — QUICK NOTE
Patient's contact, Jason Grullon, called and updated on the patient's need for transfusion overnight, current condition, and the plan for workup and management  Questions, such as locations of known clots and about the ultrasound imaging, were answered to his satisfaction  Topics, such as, but not limited to, concern for GI/urinary bleeding from earlier in his hospital course, anticoagulation, fluids, pulmonary embolism, aortic thrombus, were either re-reviewed or discussed

## 2021-01-16 NOTE — RESPIRATORY THERAPY NOTE
RT Ventilator Management Note  Pauly Silva 56 y o  male MRN: [id number]  Unit/Bed#: [facility] 11 Encounter: [id number]      Daily Screen       1/15/2021  0419 1/15/2021  0737          Patient safety screen outcome[de-identified]  Failed  Failed      Not Ready for Weaning due to[de-identified]  FiO2 >60%;PEEP > 8cmH2O  FiO2 >60%              Physical Exam:   Assessment Type: (P) Assess only  General Appearance: (P) Sedated  Respiratory Pattern: (P) Assisted  Chest Assessment: (P) Chest expansion symmetrical  Bilateral Breath Sounds: (P) Rhonchi  Cough: (P) None  Suction: (P) ET Tube  O2 Device: (P) Ventilator      Resp Comments: (P) Pt  stable on CMV/PC mode  Will continue to assess and monitor pt  per protocol

## 2021-01-16 NOTE — CONSULTS
Inpatient consult to Acute Pain Service  Consult performed by: Ernie Trinh MD  Consult ordered by: Eunice Whitaker MD        Progress Note - Acute Pain Service    Mallory Quevedo 56 y o  male MRN: [id number]  Unit/Bed#: MICU 11 Encounter: [id number]      Assessment:   Principal Problem:    Pneumonia due to COVID-19 virus  Active Problems:    Diabetic ketoacidosis without coma associated with type 2 diabetes mellitus ([facility] [address] )    Acute metabolic encephalopathy    Acute kidney injury ([facility] )    Acute respiratory failure with hypoxia ([facility] )    Sepsis due to COVID-19 ([facility] [address] )    Hypernatremia    Metabolic acidosis, increased anion gap    Aortic thrombus ([facility] )    Mallory Quevedo is a 56 y o  male  S/p right AKA, covid +, now c/b pneumonia, DKA, emboli, and ischemia, currently intubated and sedated with decreasing pressor requirements    Pt examined from outside his room 2/2 active covid infection  Patient on 70mg methadone home dose, converted to dilaudid drip, increased yesterday to 2mg/hr  Ketamine originally ordered yesterday at 1mg/kg/hr however decreased to 0 5mg/kg/hr in the setting of tachycardia/tachypnea/agitation  It was unclear at the time if this was ketamine related or withdrawal symptoms  Today, symptoms have improved, more comfortable on vent with lower ketamine dose and slightly higher dilaudid dose  Plan:   - Ketamine per ICU for sedation   - t/c extubating on low dose ketamine for pain control (0 1-0 2mg/kg/hr)  - Holding methadone given acute setting  - Dilaudid gtt 2mg/hr with 1mg IV bolus q2hrs PRN for breakthrough  - Will avoid regional anesthesia procedure (nerve block) at this time given sedation improvement   - platelts 61, on heparin gtt  - Bowel regimen per ICU - to prevent OIC    APS will continue to follow   Please call  / [phone] or [employer] [facility] (/ between [phone] and on weekends) with questions or concerns    Pain History  Current pain location(s): n/a  Pain Scale:   sedated  Quality: n/a  24 hour history: as described above  Meds/Allergies   all current active meds have been reviewed    Allergies   Allergen Reactions    Varenicline        Objective     Temp:  [96 1 °F (35 6 °C)-99 3 °F (37 4 °C)] 97 5 °F (36 4 °C)  HR:  [] 88  Resp:  [11-40] 23  BP: (100-132)/(44-55) 132/55  Arterial Line BP: ()/(38-56) 138/50  FiO2 (%):  [50] 50    Physical Exam  Constitutional:       General: He is in acute distress  Appearance: He is ill-appearing  Cardiovascular:      Rate and Rhythm: Tachycardia present  Pulmonary:      Comments: intubated  Abdominal:      General: Abdomen is flat  Musculoskeletal:      Comments: Right AKA bandaged, appears c/d/i   Neurological:      Comments: sedated         Lab Results:   Results from last 7 days   Lab Units 01/16/21  0743   WBC Thousand/uL 10 66*   HEMOGLOBIN g/dL 8 0*   HEMATOCRIT % 24 1*   PLATELETS Thousands/uL 60*      Results from last 7 days   Lab Units 01/16/21  0743 01/16/21  0437  01/13/21  2202   POTASSIUM mmol/L 4 1 3 9   < >  --    CHLORIDE mmol/L 119* 119*   < >  --    CO2 mmol/L 23 23   < >  --    CO2, I-STAT mmol/L  --   --   --  23   BUN mg/dL 21 20   < >  --    CREATININE mg/dL 0 65 0 66   < >  --    CALCIUM mg/dL 7 3* 7 1*   < >  --    ALK PHOS U/L  --  104  --   --    ALT U/L  --  82*  --   --    AST U/L  --  139*  --   --    GLUCOSE, ISTAT mg/dl  --   --   --  179*    < > = values in this interval not displayed  Imaging Studies: I have personally reviewed pertinent reports  EKG, Pathology, and Other Studies: I have personally reviewed pertinent reports  Counseling / Coordination of Care  Total floor / unit time spent today 15 minutes  Greater than 50% of total time was spent with the patient and / or family counseling and / or coordination of care   A description of the counseling / coordination of care: chart review, post op pain and regional pain management, discussion/planning with nursing/medical/surgical teams    Please note that the APS provides consultative services regarding pain management only  With the exception of ketamine and epidural infusions and except when indicated, final decisions regarding starting or changing doses of analgesic medications are at the discretion of the consulting service  Off hours consultation and/or medication management is generally not available      Jodi Katz MD  Acute Pain Service

## 2021-01-16 NOTE — RESPIRATORY THERAPY NOTE
RT Ventilator Management Note  Rosa Thakur 56 y o  male MRN: [id number]  Unit/Bed#: [facility] 11 Encounter: [id number]      Daily Screen       1/15/2021  0737 1/16/2021  0828          Patient safety screen outcome[de-identified]  Failed  Passed      Not Ready for Weaning due to[de-identified]  FiO2 >60%                Physical Exam:   Assessment Type: Assess only  General Appearance: Sedated  Respiratory Pattern: Assisted, Spontaneous  Chest Assessment: Chest expansion symmetrical  Bilateral Breath Sounds: Coarse  L Breath Sounds: Diminished  Cough: None  Suction: ET Tube  O2 Device: vent      Resp Comments: Patient was placed on PSV with PS 5, PEEP 6, 50% FIO2 by Dr Dennise Love for patient comfort  Patient with RSBI <50 at present time appearing comfortable on vent  Small to mod  amount tan sputum from ETT  No other changes at this time  No plans to extubate at this time          01/16/21 0828   Vent Information   Vent ID [id number]   Vent type Smith G5   Smith G5 Vent Mode SPONT   $ Pulse Oximetry Spot Check Charge Completed   SpO2 95 %   SPONT Settings   FIO2 (%) 50 %   PEEP (cmH2O) 6 cmH2O   Pressure Support (cmH2O) 5 cmH20   Flow Trigger (LPM) 5 LPM   P-ramp (ms) 50 ms   ETS  (%) 25 %   Humidification Heater   Heater Temp 87 8 °F (31 °C)   SPONT Actuals   Resp Rate (BPM) 21 BPM   VT (mL) 551 mL   MV (Obs) 12 1   MAP (cmH2O) 8 6 cmH2O   Peak Pressure (cmH2O) 16 cmH2O   I:E Ratio (Obs) 1:1 7   RSBI (f/vt) 39 f/Vt   Heater Temperature (Obs) 88 °F (31 1 °C)   SPONT Alarms   High Peak Pressure (cmH20) 45 cmH2O   High Resp Rate (BPM) 50 BPM   High MV (L/min) 22 L/min   Low MV (L/min) 4 L/min   High Spont VTE (mL) 1100 mL   Low Spont VTE (mL) 300 mL   Apnea Time (S) 20 S   SPONT Apnea Settings   Resp Rate (BPM) 15 BPM   FIO2 (%) 100 %   %TI (%) 1 3 %  (PC 15)   Apnea Time (S) 20 S   Maintenance   Alarm (pink) cable attached No   Resuscitation bag with peep valve at bedside Yes   Water bag changed No   Circuit changed No   Daily Screen Patient safety screen outcome: Passed   IHI Ventilator Associated Pneumonia Bundle   Daily Assessment of Readiness to Extubate Yes   Head of Bed Elevated HOB 30   ETT  Oral;Hi-Lo; Cuffed 7 5 mm   Placement Date/Time: 01/12/21 0500   Mask Ventilation: Ventilated by mask (1)  Preoxygenated: Yes  Type: Oral;Hi-Lo; Cuffed  Tube Size: 7 5 mm  Laryngoscope: GlideScope  Insertion attempts: 1  Placement Verification: Auscultation; Chest x-ray;End tidal      Secured at (cm) 24   Measured from [facility] [address] Right   Repositioned Center to Right   Secured by Commercial tube azpata   Site Condition Dry   HI-LO Suction  Intermittent suction

## 2021-01-17 ENCOUNTER — ANESTHESIA EVENT (INPATIENT)
Dept: PERIOP | Facility: HOSPITAL | Age: 61
DRG: 853 | End: 2021-01-17
Payer: COMMERCIAL

## 2021-01-17 LAB
ANION GAP SERPL CALCULATED.3IONS-SCNC: 4 MMOL/L (ref 4–13)
ANION GAP SERPL CALCULATED.3IONS-SCNC: 6 MMOL/L (ref 4–13)
APTT PPP: 57 SECONDS (ref 23–37)
APTT PPP: 67 SECONDS (ref 23–37)
APTT PPP: 76 SECONDS (ref 23–37)
ATRIAL RATE: 80 BPM
ATRIAL RATE: 81 BPM
ATRIAL RATE: 81 BPM
ATRIAL RATE: 82 BPM
ATRIAL RATE: 89 BPM
BACTERIA BLD CULT: NORMAL
BACTERIA BLD CULT: NORMAL
BASE EXCESS BLDA CALC-SCNC: 0.2 MMOL/L
BUN SERPL-MCNC: 20 MG/DL (ref 5–25)
BUN SERPL-MCNC: 21 MG/DL (ref 5–25)
CALCIUM SERPL-MCNC: 7.5 MG/DL (ref 8.3–10.1)
CALCIUM SERPL-MCNC: 7.5 MG/DL (ref 8.3–10.1)
CHLORIDE SERPL-SCNC: 113 MMOL/L (ref 100–108)
CHLORIDE SERPL-SCNC: 113 MMOL/L (ref 100–108)
CK MB SERPL-MCNC: 7.2 NG/ML (ref 0–5)
CK MB SERPL-MCNC: <1 % (ref 0–2.5)
CK SERPL-CCNC: 2573 U/L (ref 39–308)
CO2 SERPL-SCNC: 24 MMOL/L (ref 21–32)
CO2 SERPL-SCNC: 26 MMOL/L (ref 21–32)
CREAT SERPL-MCNC: 0.74 MG/DL (ref 0.6–1.3)
CREAT SERPL-MCNC: 0.8 MG/DL (ref 0.6–1.3)
ERYTHROCYTE [DISTWIDTH] IN BLOOD BY AUTOMATED COUNT: 14.2 % (ref 11.6–15.1)
GFR SERPL CREATININE-BSD FRML MDRD: 96 ML/MIN/1.73SQ M
GFR SERPL CREATININE-BSD FRML MDRD: 99 ML/MIN/1.73SQ M
GLUCOSE SERPL-MCNC: 106 MG/DL (ref 65–140)
GLUCOSE SERPL-MCNC: 120 MG/DL (ref 65–140)
GLUCOSE SERPL-MCNC: 132 MG/DL (ref 65–140)
GLUCOSE SERPL-MCNC: 133 MG/DL (ref 65–140)
GLUCOSE SERPL-MCNC: 137 MG/DL (ref 65–140)
GLUCOSE SERPL-MCNC: 142 MG/DL (ref 65–140)
GLUCOSE SERPL-MCNC: 142 MG/DL (ref 65–140)
GLUCOSE SERPL-MCNC: 154 MG/DL (ref 65–140)
GLUCOSE SERPL-MCNC: 159 MG/DL (ref 65–140)
GLUCOSE SERPL-MCNC: 159 MG/DL (ref 65–140)
GLUCOSE SERPL-MCNC: 160 MG/DL (ref 65–140)
GLUCOSE SERPL-MCNC: 173 MG/DL (ref 65–140)
GLUCOSE SERPL-MCNC: 181 MG/DL (ref 65–140)
GLUCOSE SERPL-MCNC: 185 MG/DL (ref 65–140)
GLUCOSE SERPL-MCNC: 221 MG/DL (ref 65–140)
HCO3 BLDA-SCNC: 22.9 MMOL/L (ref 22–28)
HCT VFR BLD AUTO: 24.2 % (ref 36.5–49.3)
HGB BLD-MCNC: 8.2 G/DL (ref 12–17)
HOROWITZ INDEX BLDA+IHG-RTO: 60 MM[HG]
MAGNESIUM SERPL-MCNC: 1.8 MG/DL (ref 1.6–2.6)
MAGNESIUM SERPL-MCNC: 1.8 MG/DL (ref 1.6–2.6)
MCH RBC QN AUTO: 29.8 PG (ref 26.8–34.3)
MCHC RBC AUTO-ENTMCNC: 33.9 G/DL (ref 31.4–37.4)
MCV RBC AUTO: 88 FL (ref 82–98)
NRBC BLD AUTO-RTO: 1 /100 WBCS
O2 CT BLDA-SCNC: 12.1 ML/DL (ref 16–23)
OXYHGB MFR BLDA: 96.6 % (ref 94–97)
P AXIS: 54 DEGREES
P AXIS: 66 DEGREES
P AXIS: 67 DEGREES
P AXIS: 67 DEGREES
P AXIS: 68 DEGREES
PCO2 BLDA: 29.5 MM HG (ref 36–44)
PEEP RESPIRATORY: 6 CM[H2O]
PH BLDA: 7.51 [PH] (ref 7.35–7.45)
PHOSPHATE SERPL-MCNC: 1.9 MG/DL (ref 2.3–4.1)
PHOSPHATE SERPL-MCNC: 2.2 MG/DL (ref 2.3–4.1)
PLATELET # BLD AUTO: 101 THOUSANDS/UL (ref 149–390)
PMV BLD AUTO: 12.5 FL (ref 8.9–12.7)
PO2 BLDA: 98.4 MM HG (ref 75–129)
POTASSIUM SERPL-SCNC: 4.1 MMOL/L (ref 3.5–5.3)
POTASSIUM SERPL-SCNC: 4.2 MMOL/L (ref 3.5–5.3)
PR INTERVAL: 113 MS
PR INTERVAL: 117 MS
PR INTERVAL: 146 MS
QRS AXIS: 74 DEGREES
QRS AXIS: 75 DEGREES
QRS AXIS: 76 DEGREES
QRS AXIS: 76 DEGREES
QRS AXIS: 78 DEGREES
QRSD INTERVAL: 67 MS
QT INTERVAL: 350 MS
QT INTERVAL: 367 MS
QT INTERVAL: 371 MS
QT INTERVAL: 379 MS
QT INTERVAL: 383 MS
QTC INTERVAL: 424 MS
QTC INTERVAL: 426 MS
QTC INTERVAL: 431 MS
QTC INTERVAL: 443 MS
QTC INTERVAL: 445 MS
RBC # BLD AUTO: 2.75 MILLION/UL (ref 3.88–5.62)
SODIUM SERPL-SCNC: 143 MMOL/L (ref 136–145)
SODIUM SERPL-SCNC: 143 MMOL/L (ref 136–145)
SPECIMEN SOURCE: ABNORMAL
T WAVE AXIS: 55 DEGREES
T WAVE AXIS: 60 DEGREES
T WAVE AXIS: 61 DEGREES
T WAVE AXIS: 67 DEGREES
T WAVE AXIS: 73 DEGREES
VENT AC: ABNORMAL
VENT- AC: AC
VENTRICULAR RATE: 80 BPM
VENTRICULAR RATE: 81 BPM
VENTRICULAR RATE: 81 BPM
VENTRICULAR RATE: 82 BPM
VENTRICULAR RATE: 89 BPM
WBC # BLD AUTO: 10.55 THOUSAND/UL (ref 4.31–10.16)

## 2021-01-17 PROCEDURE — 82550 ASSAY OF CK (CPK): CPT | Performed by: EMERGENCY MEDICINE

## 2021-01-17 PROCEDURE — 82553 CREATINE MB FRACTION: CPT | Performed by: EMERGENCY MEDICINE

## 2021-01-17 PROCEDURE — 84100 ASSAY OF PHOSPHORUS: CPT | Performed by: EMERGENCY MEDICINE

## 2021-01-17 PROCEDURE — 94640 AIRWAY INHALATION TREATMENT: CPT

## 2021-01-17 PROCEDURE — 93010 ELECTROCARDIOGRAM REPORT: CPT | Performed by: INTERNAL MEDICINE

## 2021-01-17 PROCEDURE — 85730 THROMBOPLASTIN TIME PARTIAL: CPT | Performed by: EMERGENCY MEDICINE

## 2021-01-17 PROCEDURE — 82805 BLOOD GASES W/O2 SATURATION: CPT | Performed by: NURSE PRACTITIONER

## 2021-01-17 PROCEDURE — 80048 BASIC METABOLIC PNL TOTAL CA: CPT | Performed by: EMERGENCY MEDICINE

## 2021-01-17 PROCEDURE — 94150 VITAL CAPACITY TEST: CPT

## 2021-01-17 PROCEDURE — 94760 N-INVAS EAR/PLS OXIMETRY 1: CPT

## 2021-01-17 PROCEDURE — 99291 CRITICAL CARE FIRST HOUR: CPT | Performed by: INTERNAL MEDICINE

## 2021-01-17 PROCEDURE — 83735 ASSAY OF MAGNESIUM: CPT | Performed by: EMERGENCY MEDICINE

## 2021-01-17 PROCEDURE — 85027 COMPLETE CBC AUTOMATED: CPT | Performed by: EMERGENCY MEDICINE

## 2021-01-17 PROCEDURE — 82948 REAGENT STRIP/BLOOD GLUCOSE: CPT

## 2021-01-17 PROCEDURE — 93005 ELECTROCARDIOGRAM TRACING: CPT

## 2021-01-17 PROCEDURE — C9113 INJ PANTOPRAZOLE SODIUM, VIA: HCPCS | Performed by: EMERGENCY MEDICINE

## 2021-01-17 PROCEDURE — 93970 EXTREMITY STUDY: CPT | Performed by: SURGERY

## 2021-01-17 PROCEDURE — 99232 SBSQ HOSP IP/OBS MODERATE 35: CPT | Performed by: ANESTHESIOLOGY

## 2021-01-17 PROCEDURE — 85730 THROMBOPLASTIN TIME PARTIAL: CPT | Performed by: INTERNAL MEDICINE

## 2021-01-17 RX ORDER — ACETAMINOPHEN 325 MG/1
650 TABLET ORAL EVERY 6 HOURS PRN
Status: DISCONTINUED | OUTPATIENT
Start: 2021-01-17 | End: 2021-01-27

## 2021-01-17 RX ORDER — MAGNESIUM SULFATE HEPTAHYDRATE 40 MG/ML
2 INJECTION, SOLUTION INTRAVENOUS ONCE
Status: COMPLETED | OUTPATIENT
Start: 2021-01-17 | End: 2021-01-17

## 2021-01-17 RX ORDER — MIDAZOLAM HYDROCHLORIDE 2 MG/2ML
4 INJECTION, SOLUTION INTRAMUSCULAR; INTRAVENOUS ONCE
Status: COMPLETED | OUTPATIENT
Start: 2021-01-17 | End: 2021-01-17

## 2021-01-17 RX ADMIN — CEFTRIAXONE 1000 MG: 1 INJECTION, POWDER, FOR SOLUTION INTRAMUSCULAR; INTRAVENOUS at 13:16

## 2021-01-17 RX ADMIN — KETAMINE HYDROCHLORIDE 1 MG/KG/HR: 50 INJECTION INTRAMUSCULAR; INTRAVENOUS at 23:31

## 2021-01-17 RX ADMIN — KETAMINE HYDROCHLORIDE 1 MG/KG/HR: 50 INJECTION INTRAMUSCULAR; INTRAVENOUS at 09:43

## 2021-01-17 RX ADMIN — PROPOFOL 30 MCG/KG/MIN: 10 INJECTION, EMULSION INTRAVENOUS at 01:04

## 2021-01-17 RX ADMIN — SENNOSIDES 17.2 MG: 8.6 TABLET, FILM COATED ORAL at 17:46

## 2021-01-17 RX ADMIN — CEFEPIME HYDROCHLORIDE 2000 MG: 2 INJECTION, POWDER, FOR SOLUTION INTRAVENOUS at 09:27

## 2021-01-17 RX ADMIN — PROPOFOL 50 MCG/KG/MIN: 10 INJECTION, EMULSION INTRAVENOUS at 10:44

## 2021-01-17 RX ADMIN — ZINC SULFATE 220 MG (50 MG) CAPSULE 220 MG: CAPSULE at 08:50

## 2021-01-17 RX ADMIN — HYDROMORPHONE HYDROCHLORIDE 2 MG: 1 INJECTION, SOLUTION INTRAMUSCULAR; INTRAVENOUS; SUBCUTANEOUS at 16:03

## 2021-01-17 RX ADMIN — MIDAZOLAM 1 MG: 1 INJECTION INTRAMUSCULAR; INTRAVENOUS at 20:14

## 2021-01-17 RX ADMIN — PROPOFOL 50 MCG/KG/MIN: 10 INJECTION, EMULSION INTRAVENOUS at 20:14

## 2021-01-17 RX ADMIN — LEVALBUTEROL HYDROCHLORIDE 1.25 MG: 1.25 SOLUTION, CONCENTRATE RESPIRATORY (INHALATION) at 01:29

## 2021-01-17 RX ADMIN — OXYCODONE HYDROCHLORIDE AND ACETAMINOPHEN 1000 MG: 500 TABLET ORAL at 20:14

## 2021-01-17 RX ADMIN — ATORVASTATIN CALCIUM 40 MG: 40 TABLET, FILM COATED ORAL at 20:14

## 2021-01-17 RX ADMIN — MIDAZOLAM 1 MG: 1 INJECTION INTRAMUSCULAR; INTRAVENOUS at 14:06

## 2021-01-17 RX ADMIN — Medication 2000 UNITS: at 08:50

## 2021-01-17 RX ADMIN — SODIUM CHLORIDE SOLN NEBU 3% 4 ML: 3 NEBU SOLN at 01:30

## 2021-01-17 RX ADMIN — OXYCODONE HYDROCHLORIDE AND ACETAMINOPHEN 1000 MG: 500 TABLET ORAL at 08:50

## 2021-01-17 RX ADMIN — MAGNESIUM SULFATE HEPTAHYDRATE 2 G: 40 INJECTION, SOLUTION INTRAVENOUS at 15:20

## 2021-01-17 RX ADMIN — LEVALBUTEROL HYDROCHLORIDE 1.25 MG: 1.25 SOLUTION, CONCENTRATE RESPIRATORY (INHALATION) at 19:21

## 2021-01-17 RX ADMIN — MIDAZOLAM 4 MG: 1 INJECTION INTRAMUSCULAR; INTRAVENOUS at 08:52

## 2021-01-17 RX ADMIN — CHLORHEXIDINE GLUCONATE 0.12% ORAL RINSE 15 ML: 1.2 LIQUID ORAL at 20:14

## 2021-01-17 RX ADMIN — NOREPINEPHRINE BITARTRATE 5 MCG/MIN: 1 INJECTION, SOLUTION, CONCENTRATE INTRAVENOUS at 20:06

## 2021-01-17 RX ADMIN — MIDAZOLAM 1 MG: 1 INJECTION INTRAMUSCULAR; INTRAVENOUS at 16:03

## 2021-01-17 RX ADMIN — KETAMINE HYDROCHLORIDE 1 MG/KG/HR: 50 INJECTION INTRAMUSCULAR; INTRAVENOUS at 16:55

## 2021-01-17 RX ADMIN — PANTOPRAZOLE SODIUM 40 MG: 40 INJECTION, POWDER, FOR SOLUTION INTRAVENOUS at 08:50

## 2021-01-17 RX ADMIN — LEVALBUTEROL HYDROCHLORIDE 1.25 MG: 1.25 SOLUTION, CONCENTRATE RESPIRATORY (INHALATION) at 08:00

## 2021-01-17 RX ADMIN — LEVALBUTEROL HYDROCHLORIDE 1.25 MG: 1.25 SOLUTION, CONCENTRATE RESPIRATORY (INHALATION) at 13:06

## 2021-01-17 RX ADMIN — HEPARIN SODIUM 12 UNITS/KG/HR: 10000 INJECTION, SOLUTION INTRAVENOUS at 05:48

## 2021-01-17 RX ADMIN — POLYETHYLENE GLYCOL 3350 17 G: 17 POWDER, FOR SOLUTION ORAL at 08:50

## 2021-01-17 RX ADMIN — MIDAZOLAM 1 MG: 1 INJECTION INTRAMUSCULAR; INTRAVENOUS at 06:41

## 2021-01-17 RX ADMIN — ACETAMINOPHEN 650 MG: 325 TABLET, FILM COATED ORAL at 12:58

## 2021-01-17 RX ADMIN — DEXAMETHASONE SODIUM PHOSPHATE 7.3 MG: 10 INJECTION, SOLUTION INTRAMUSCULAR; INTRAVENOUS at 20:14

## 2021-01-17 RX ADMIN — SODIUM CHLORIDE SOLN NEBU 3% 4 ML: 3 NEBU SOLN at 08:00

## 2021-01-17 RX ADMIN — SENNOSIDES 17.2 MG: 8.6 TABLET, FILM COATED ORAL at 08:50

## 2021-01-17 RX ADMIN — CHLORHEXIDINE GLUCONATE 0.12% ORAL RINSE 15 ML: 1.2 LIQUID ORAL at 08:50

## 2021-01-17 RX ADMIN — PROPOFOL 50 MCG/KG/MIN: 10 INJECTION, EMULSION INTRAVENOUS at 15:28

## 2021-01-17 RX ADMIN — PROPOFOL 30 MCG/KG/MIN: 10 INJECTION, EMULSION INTRAVENOUS at 04:48

## 2021-01-17 RX ADMIN — POTASSIUM PHOSPHATE, MONOBASIC AND POTASSIUM PHOSPHATE, DIBASIC 30 MMOL: 224; 236 INJECTION, SOLUTION, CONCENTRATE INTRAVENOUS at 14:30

## 2021-01-17 RX ADMIN — PANTOPRAZOLE SODIUM 40 MG: 40 INJECTION, POWDER, FOR SOLUTION INTRAVENOUS at 20:14

## 2021-01-17 RX ADMIN — DEXAMETHASONE SODIUM PHOSPHATE 7.3 MG: 10 INJECTION, SOLUTION INTRAMUSCULAR; INTRAVENOUS at 08:55

## 2021-01-17 RX ADMIN — SODIUM CHLORIDE SOLN NEBU 3% 4 ML: 3 NEBU SOLN at 19:20

## 2021-01-17 RX ADMIN — HYDROMORPHONE HYDROCHLORIDE 2 MG: 1 INJECTION, SOLUTION INTRAMUSCULAR; INTRAVENOUS; SUBCUTANEOUS at 09:24

## 2021-01-17 RX ADMIN — SODIUM CHLORIDE SOLN NEBU 3% 4 ML: 3 NEBU SOLN at 13:06

## 2021-01-17 RX ADMIN — KETAMINE HYDROCHLORIDE 1 MG/KG/HR: 50 INJECTION INTRAMUSCULAR; INTRAVENOUS at 03:28

## 2021-01-17 RX ADMIN — HYDROMORPHONE HYDROCHLORIDE 2 MG: 1 INJECTION, SOLUTION INTRAMUSCULAR; INTRAVENOUS; SUBCUTANEOUS at 20:14

## 2021-01-17 NOTE — RESPIRATORY THERAPY NOTE
RT Ventilator Management Note  Pauly Silva 56 y o  male MRN: [id number]  Unit/Bed#: [facility] 11 Encounter: [id number]      Daily Screen       1/16/2021 0828 1/17/2021  0805          Patient safety screen outcome[de-identified]  Passed  Failed      Not Ready for Weaning due to[de-identified]    Underline problem not resolved              Physical Exam:   Assessment Type: Assess only  General Appearance: Sedated  Respiratory Pattern: Assisted  Chest Assessment: Chest expansion symmetrical  Bilateral Breath Sounds: Diminished  R Breath Sounds: Diminished  L Breath Sounds: Diminished  Suction: (P) ET Tube      Resp Comments: (P) Pt remains on PS settings and is tolerating well @ this time  Suctioned for a minimal amount of blood tinged secretions  VS are stable, will continue to monitor

## 2021-01-17 NOTE — PROGRESS NOTES
Progress Note - Acute Pain Service    Michelle Arguello 56 y o  male MRN: [id number]  Unit/Bed#: MICU 11 Encounter: [id number]      Assessment:   Principal Problem:    Pneumonia due to COVID-19 virus  Active Problems:    Diabetic ketoacidosis without coma associated with type 2 diabetes mellitus ([facility] [address] )    Acute metabolic encephalopathy    Acute kidney injury ([facility] )    Acute respiratory failure with hypoxia ([facility])    Sepsis due to COVID-19 ([facility] [address] )    Hypernatremia    Metabolic acidosis, increased anion gap    Aortic thrombus ([facility] )    Michelle Arguello is a 56 y o  male  S/p right AKA, covid +, now c/b pneumonia, DKA, emboli, and ischemia, currently intubated and sedated with pressor requirements     Pt examined from outside his room 2/2 active covid infection  Patient on 70mg methadone home dose  Ketamine infusion for sedation has been ranging from 0 5-1 mg/kg/hr  Pt also has dilaudid gtt at 2 mg/hr and propofol infusion  Pt is on TPN and has an OGT  Not a candidate for regional anesthesia at this time (on heparin gtt)    Over 24 hrs, pt has received dilaudid 6 mg IV for breakthrough in addition to dilaudid gtt, and versed 6 mg IV total     Discussed plan with MICU team: anticipate procedure tomorrow with vascular surgery group  Pt continues to be on a heparin gtt  Should heparin gtt be discontinued for surgical intervention, there may be a window of opportunity for regional nerve block placement (I e  single shot sciatic nerve block with exparel) to help with pain management of his AKA stump  In the meantime, will hold off on methadone use until patient had formal OR procedures done       Plan:   - continue ketamine and propofol sedation as per MICU team    - spoke to pharmacy concerning methadone and routes of administration available: if goals to wean ketamine sedation, consider providing his basline methadone 10 mg IV TID (70 mg PO methadone is equivalent to 30-35 mg IV methadone as per pharmacy) to help with ketamine wean  - continue dilaudid gtt at 2 mg/hr  - continue dilaudid 1 mg IV Q 2 hrs PRN breakthrough pain  - consider gabapentin 100 mg PO TID crushed through OGT (his baseline home med)    - Bowel regimen when appropriate from surgical perspective    APS will continue to follow   Please call  / [phone] or [employer] [facility] (/ between [phone] and on weekends) with questions or concerns    Pain History  Current pain location(s): pt is currently intubated, sedated  Pain Scale:  Intubated, sedated  Quality: intubated, sedated  24 hour history: intubated, sedated still in critical condition    Opioid requirement previous 24 hours: on dilaudid gtt at 2 mg/hr    Meds/Allergies   all current active meds have been reviewed, current meds:   Current Facility-Administered Medications   Medication Dose Route Frequency    ascorbic acid (VITAMIN C) tablet 1,000 mg  1,000 mg Oral Q12H [facility]    atorvastatin (LIPITOR) tablet 40 mg  40 mg Oral HS    bisacodyl (DULCOLAX) rectal suppository 10 mg  10 mg Rectal Daily PRN    cefTRIAXone (ROCEPHIN) 1,000 mg in dextrose 5 % 50 mL IVPB  1,000 mg Intravenous Q24H    chlorhexidine (PERIDEX) 0 12 % oral rinse 15 mL  15 mL Swish & Spit Q12H [facility]    cholecalciferol (VITAMIN D3) tablet 2,000 Units  2,000 Units Oral Daily    dexamethasone (PF) (DECADRON) injection 7 3 mg  0 1 mg/kg Intravenous Q12H    heparin (porcine) 25,000 units in 0 45% NaCl 250 mL infusion (premix)  3-30 Units/kg/hr (Order-Specific) Intravenous Titrated    heparin (porcine) injection 3,000 Units  3,000 Units Intravenous Q1H PRN    heparin (porcine) injection 6,000 Units  6,000 Units Intravenous Q1H PRN    HYDROmorphone (DILAUDID) 50 mg in sodium chloride 0 9% 50mL drip  2 mg/hr Intravenous Continuous    HYDROmorphone (DILAUDID) injection 2 mg  2 mg Intravenous Q2H PRN    insulin regular (HumuLIN R,NovoLIN R) 1 Units/mL in sodium chloride 0 9 % 100 mL infusion  0 3-21 Units/hr Intravenous Titrated    ketamine 500 mg (DOUBLE CONCENTRATION) IV in sodium chloride 0 9% 250 mL  1 mg/kg/hr Intravenous Continuous    levalbuterol (XOPENEX) inhalation solution 1 25 mg  1 25 mg Nebulization Q6H    midazolam (VERSED) injection 1 mg  1 mg Intravenous Q1H PRN    zinc sulfate (ZINCATE) capsule 220 mg  220 mg Oral Daily    Followed by   Jerral Kocher ON 2021] multivitamin-minerals (CENTRUM ADULTS) tablet 1 tablet  1 tablet Oral Daily    norepinephrine (LEVOPHED) 4 mg (STANDARD CONCENTRATION) IV in sodium chloride 0 9% 250 mL  1-30 mcg/min Intravenous Titrated    oxyCODONE (ROXICODONE) immediate release tablet 10 mg  10 mg Oral Q6H PRN    pantoprazole (PROTONIX) injection 40 mg  40 mg Intravenous Q12H [address]    polyethylene glycol (MIRALAX) packet 17 g  17 g Oral Daily    propofol (DIPRIVAN) 1000 mg in 100 mL infusion (premix)  5-50 mcg/kg/min Intravenous Titrated    senna (SENOKOT) tablet 17 2 mg  2 tablet Oral BID    sodium chloride 3 % inhalation solution 4 mL  4 mL Nebulization Q6H    and PTA meds:   Prior to Admission Medications   Prescriptions Last Dose Informant Patient Reported? Taking?    Blood Glucose Monitoring Suppl (June Casiano) w/Device KIT  Self No No   Sig: Use to test blood sugars 3 times daily   Empagliflozin (Jardiance) 25 MG TABS   No No   Sig: Take 1 tablet (25 mg total) by mouth every morning   Insulin Pen Needle 31G X 5 MM MISC  Self No No   Sig: by Does not apply route daily Pt to inject 4 X daily   Insulin Pen Needle 31G X 5 MM MISC  Self No No   Si units sq 2X daily   ergocalciferol (VITAMIN D2) 50,000 units   No No   Sig: Take 1 capsule (50,000 Units total) by mouth once a week   erythromycin (ILOTYCIN) ophthalmic ointment  Self Yes No   Si 5 inches daily at bedtime   furosemide (LASIX) 40 mg tablet  Self No No   Sig: Take 1 tablet (40 mg total) by mouth daily   Patient taking differently: Take 40 mg by mouth as needed (on off work days)    gabapentin (NEURONTIN) 100 mg capsule   No No   Sig: Take 1 capsule (100 mg total) by mouth 3 (three) times a day   glucose blood (OneTouch Verio) test strip   No No   Sig: Use to test blood sugars 3 times daily   insulin NPH-insulin regular (NovoLIN 70/30) 100 units/mL subcutaneous injection   No No   Sig: Inject 40 Units under the skin 2 (two) times a day before meals   Patient taking differently: Inject 40 Units under the skin 2 (two) times a day before meals Pt states he takes it "once in awhile"   insulin glargine (LANTUS) 100 units/mL subcutaneous injection   No No   Sig: Inject 10 Units under the skin daily at bedtime   Patient taking differently: Inject 20 Units under the skin daily at bedtime    isosorbide mononitrate (IMDUR) 120 mg 24 hr tablet   No No   Sig: Take 1 tablet (120 mg total) by mouth daily   lisinopril-hydrochlorothiazide (PRINZIDE,ZESTORETIC) 20-25 MG per tablet   No No   Sig: Take 1 tablet by mouth daily   methadone (DOLOPHINE) 10 MG/5ML solution  Self Yes No   Sig: Take 70 mg by mouth   omeprazole (PriLOSEC) 20 mg delayed release capsule   No No   Sig: Take 1 capsule (20 mg total) by mouth daily   ondansetron (ZOFRAN-ODT) 4 mg disintegrating tablet   No No   Sig: Take 1 tablet (4 mg total) by mouth every 6 (six) hours as needed for nausea or vomiting   potassium chloride (K-DUR,KLOR-CON) 20 mEq tablet  Self No No   Sig: Take 2 tablets (40 mEq total) by mouth daily   pravastatin (PRAVACHOL) 40 mg tablet   No No   Sig: Take 1 tablet (40 mg total) by mouth daily   promethazine (PHENERGAN) 25 mg tablet   No No   Sig: TAKE 1 TABLET TWICE A DAY   sitaGLIPtin-metFORMIN (JANUMET)  MG per tablet   No No   Sig: Take 1 tablet by mouth 2 (two) times a day with meals      Facility-Administered Medications: None       Allergies   Allergen Reactions    Varenicline        Objective     Temp:  [93 9 °F (34 4 °C)-100 °F (37 8 °C)] 99 7 °F (37 6 °C)  HR:  [] 100  Resp:  [16-44] 32  Arterial Line BP: ()/(36-58) 120/46  FiO2 (%):  [50] 50    Physical Exam  Vitals signs (unable to assess most of physical exam secondary to intubated/sedated status) and nursing note reviewed  Constitutional:       Appearance: He is ill-appearing  HENT:      Mouth/Throat:      Comments: Intubated    Eyes:      Comments: Sedated           Lab Results:   Results from last 7 days   Lab Units 01/17/21  0755   WBC Thousand/uL 10 55*   HEMOGLOBIN g/dL 8 2*   HEMATOCRIT % 24 2*   PLATELETS Thousands/uL 101*      Results from last 7 days   Lab Units 01/17/21  0645  01/16/21  0437  01/13/21  2202   POTASSIUM mmol/L 4 1   < > 3 9   < >  --    CHLORIDE mmol/L 113*   < > 119*   < >  --    CO2 mmol/L 24   < > 23   < >  --    CO2, I-STAT mmol/L  --   --   --   --  23   BUN mg/dL 20   < > 20   < >  --    CREATININE mg/dL 0 80   < > 0 66   < >  --    CALCIUM mg/dL 7 5*   < > 7 1*   < >  --    ALK PHOS U/L  --   --  104  --   --    ALT U/L  --   --  82*  --   --    AST U/L  --   --  139*  --   --    GLUCOSE, ISTAT mg/dl  --   --   --   --  179*    < > = values in this interval not displayed  Imaging Studies: I have personally reviewed pertinent reports  EKG, Pathology, and Other Studies: I have personally reviewed pertinent reports  Counseling / Coordination of Care  Total floor / unit time spent today 20 minutes  Greater than 50% of total time was spent with the patient and / or family counseling and / or coordination of care  A description of the counseling / coordination of care: discussed goals for pain control with MICU team and pharmacy teams  Please note that the APS provides consultative services regarding pain management only  With the exception of ketamine and epidural infusions and except when indicated, final decisions regarding starting or changing doses of analgesic medications are at the discretion of the consulting service    Off hours consultation and/or medication management is generally not available      Mike Lara MD  Acute Pain Service

## 2021-01-17 NOTE — ANESTHESIA PREPROCEDURE EVALUATION
Procedure:  AMPUTATION ABOVE KNEE (AKA) R AKA wound washout, possible closure (Right Leg Upper)    Relevant Problems   CARDIO   (+) Aortic thrombus (HCC)   (+) Essential hypertension   (+) Mixed hyperlipidemia   (+) SOB (shortness of breath) on exertion      ENDO   (+) Type 2 diabetes mellitus with diabetic polyneuropathy (HCC)      GI/HEPATIC   (+) Acute pancreatitis   (+) Chronic hepatitis C without hepatic coma (HCC)      /RENAL   (+) Acute kidney injury ([address] )      NEURO/PSYCH   (+) Type 2 diabetes mellitus with diabetic polyneuropathy (HCC)      PULMONARY   (+) Acute respiratory failure with hypoxia (HCC)   (+) Pneumonia due to COVID-19 virus   (+) SOB (shortness of breath) on exertion        Physical Exam    Airway  Comment: Patient remains intubated and sedated  Dental   Comment: Patient intubated,     Cardiovascular  Rhythm: regular, Rate: normal,     Pulmonary  Rhonchi,     Other Findings        Anesthesia Plan  ASA Score- 4     Anesthesia Type- general with ASA Monitors  Additional Monitors:   Airway Plan:     Comment: Patient had tube feeds stopped at 6:15 am   Already intubated and sedated  Patient to remain supine during procedure  No need to delay case  Will continue as scheduled, apply low wall suction to feeding tube as needed to prevent aspiration          Plan Factors-    Chart reviewed  Patient is not a current smoker  Induction- intravenous  Postoperative Plan- Plan for postoperative opioid use  Informed Consent- Anesthetic plan and risks discussed with healthcare power of   I personally reviewed this patient with the CRNA  Discussed and agreed on the Anesthesia Plan with the CRNA  Bo Ruby

## 2021-01-17 NOTE — QUICK NOTE
Called and updated pt's contact, Lexie Juan, about pt's condition and management plan  Discussed overnight events, plan for OR tomorrow for formalization, anticoagulation and other medications, RUE ultrasound results prelim and pending read  All questions were answered

## 2021-01-17 NOTE — PROGRESS NOTES
Daily Progress Note - Angeline theodore Carmen Moss 14[address] 56 y o  male MRN: [id number]  Unit/Bed#: MICU 11 Encounter: [id number]        ----------------------------------------------------------------------------------------  HPI/24hr events:   Remains intubated  Vent day 6  Overnight, pt was dyssynchronous Propofol was decreased from 40 to 30 then back up to 35 this morning  Ketamine was increased from 0 5 to 1  BP's OVN were soft and pt required transient levo  Off levo now  Had respiratory alkalosis yesterday and was switched from ASVC to PS  Stayed on PS 5/5 OVN  No other acute OVN events  This morning pt mildly tachycardic and tachypnic  Had desat to 86%  Given PRN versed 1 mg with improvement      ---------------------------------------------------------------------------------------  SUBJECTIVE  This am, pt sedated and not arousable  Review of systems was unable to be performed secondary to intubated and sedated  ---------------------------------------------------------------------------------------  Assessment and Plan:    Neuro:   · Diagnosis: Encephalopathy  ? Sedation and pain management: Propofol 35, ketamine 1, dilaudid 2, PRN versed, PRN oxycodone   ? Pain management onboard given chronic methadone use   ? Attempt to wean propofol  ? Goal RASS -2 to   ? CAM-ICU  ? Sleep-Wake Cycle Regulation     CV:   · Diagnosis:  Aortic thrombus, history of hypertension with hypotension (resolved)  ? Goal map greater than 65  § Hold home BP meds  § Levophed gtt prn  ? Continue to closely monitor hemodynamics  ? Closely monitor on tele  ? Continue heparin drip  ? Vascular surgery following     Pulm:  · Diagnosis:  Acute COVID-19 infection, acute hypoxic respiratory failure, abnormal CXR, Pulmonary embolism  ? Current vent settings PS 5, PEEP6, O2 50%   ? Patient intubated on 01/12 due to hypoxia  ? Continue zinc, vitamin-D, vitamin-C, statin  ? Severe COVID-19 pathway  ? Remdesivir not given  ?  Actemra not given ? Good pulm hygene  ? Frequent suctioning  ? Trend ABG         GI:   · Diagnosis: GERD, Suspected Upper GI bleed, Hepatitis C  ? No evidence of active bleeding  ? Continue protonix BID  ? Contact GI and endoscopy if blood in the OG-tube or signs and symptoms of GI bleeding  ? Q6hour CBCs  ? Bowel regimen, senokot and miralax daily, dulcolax suppository PRN  ? Tube feeds at goal         :   · Diagnosis:  Gross hematuria, ANNALEE that has resolved, UTI, likely myoglobinuria  ? Moderate bacteria on urine microscopy on presentation  ? Continue antibiotics  ? Trend BMP q6hr  ? Per Urology, maintain Veras catheter for 7 days (1/19)  ? Monitor for bleeding  ? Strict I&Os   ? Creatinine stable 0 80  ? UOP at goal, net negative 557 6 yesterday, diuresing with lasix 20mg daily         F/E/N:   · Fluids:  Isolyte gtt, diuresis   · Electrolytes: Trend and replete as needed  · Nutrition:   Continue tube feedings        Heme/Onc:   · Diagnosis:  Thrombocytopenia  ? Predated initiation of heparin  ? No schistocytes noted on peripheral/hemolysis smear; however, poikilocytes, tear drop cells, linda cells, anisocytosis present  ? [id number] CBC  ? Hgb stable at 8 2, trend, trend plts          Endo:   · Diagnosis: DMII, DKA - now resolved  ? Insulin  ? Recent sugars         ID:   · Diagnosis: COVID19   ? Severe pathway  ? Continue zing, vit D, vit C, stain   ? Decadron day 6/10 at 0 1mg/kg  ? Remdesivir not given  ? Actemra not given  ? Convalescent plasma not given   ? Antibiotics dc'd      MSK/Skin:   · Diagnosis: Acute Limb Ischemia, Rhabdomyolysis   ? Plan:   ? S/p RLE AKA guillotine amputation  § Per vascular surgery, do not remove stump dressing  Formalization tentatively planned for 1/18  § POD 3  ? Continue to turn and reposition frequently  ? Continue pressure off-loading  ? PT/OT  ? UOP just below goal, creatinine stable, CK continues to trend down  ? Ultrasound right upper extremity, read pending    Vascular made aware of blistering         Disposition: Continue Critical Care   Code Status: Level 1 - Full Code  ---------------------------------------------------------------------------------------  ICU CORE MEASURES    Prophylaxis   VTE Pharmacologic Prophylaxis: Heparin Drip  VTE Mechanical Prophylaxis: sequential compression device  Stress Ulcer Prophylaxis: Pantoprazole IV     ABCDE Protocol (if indicated)  Plan to perform spontaneous awakening trial today? No  Plan to perform spontaneous breathing trial today? No  Obvious barriers to extubation? Yes  CAM-ICU: Negative    Invasive Devices Review  Invasive Devices     Central Venous Catheter Line            CVC Central Lines 21 Triple Right 4 days          Peripheral Intravenous Line            Peripheral IV 01/15/21 Left Antecubital 2 days          Arterial Line            Arterial Line 21 Radial 4 days          Drain            Urethral Catheter 18 Fr  -- days    NG/OG/Enteral Tube Orogastric 16 Fr 5 days          Airway            ETT  Oral;Hi-Lo; Cuffed 7 5 mm 5 days              Can any invasive devices be discontinued today? No  ---------------------------------------------------------------------------------------  OBJECTIVE    Vitals   Vitals:    21 0300 21 0400 21 0500 21 0535   BP:       BP Location:       Pulse: 100 98 (!) 108    Resp: (!) 32 (!) 31 (!) 42    Temp: 99 7 °F (37 6 °C) 99 7 °F (37 6 °C) 99 °F (37 2 °C)    TempSrc:       SpO2: 91% 91% (!) 86%    Weight:    82 3 kg (181 lb 7 oz)   Height:         Temp (24hrs), Av °F (36 7 °C), Min:93 9 °F (34 4 °C), Max:99 7 °F (37 6 °C)  Current: Temperature: 99 °F (37 2 °C)  Respiratory:  SpO2 Device: O2 Device: Ventilator       Invasive/non-invasive ventilation settings   Respiratory    Lab Data (Last 4 hours)    None         O2/Vent Data (Last 4 hours)    None                Physical Exam  Vitals signs and nursing note reviewed     Constitutional:       Appearance: He is ill-appearing  Comments: Intubated and sedated   HENT:      Head: Normocephalic and atraumatic  Right Ear: External ear normal       Left Ear: External ear normal    Neck:      Comments: RIJ catheter in place without purulence  Cardiovascular:      Rate and Rhythm: Normal rate and regular rhythm  Pulses: Normal pulses  Heart sounds: Normal heart sounds  No murmur  Pulmonary:      Effort: Pulmonary effort is normal       Comments: Mechanical breath sounds, equal b/l   Abdominal:      General: Abdomen is flat  Bowel sounds are normal       Palpations: Abdomen is soft  Musculoskeletal:      Comments: RLE AKA bandaged  RUE, LUE, and LLE trace edema   Skin:     General: Skin is warm and dry  Capillary Refill: Capillary refill takes less than 2 seconds     Neurological:      Comments: Sedated and not arousable    Psychiatric:      Comments: sedated         Laboratory and Diagnostics:  Results from last 7 days   Lab Units 01/16/21  1819 01/16/21  0743 01/16/21  0437 01/16/21  0109 01/15/21  2002 01/15/21  1723 01/15/21  0557 01/14/21  2359  01/13/21  1611 01/13/21  0440  01/12/21  0618   WBC Thousand/uL 10 93* 10 66* 10 23*  --  8 90 9 54 13 61* 8 46   < > 7 65 6 71   < > 11 76*   HEMOGLOBIN g/dL 7 9* 8 0* 8 1* 8 1* 6 9* 6 8* 7 5* 7 8*   < > 9 1* 9 7*   < > 12 9   I STAT HEMOGLOBIN   --   --   --   --   --   --   --   --    < >  --   --    < >  --    HEMATOCRIT % 23 8* 24 1* 24 1* 24 1* 20 6* 20 6* 22 5* 23 5*   < > 26 5* 29 2*   < > 37 5   HEMATOCRIT, ISTAT   --   --   --   --   --   --   --   --    < >  --   --    < >  --    PLATELETS Thousands/uL 76* 60* 61*  --  55* 58* 75* 40*   < > 34* 44*   < > 108*   NEUTROS PCT %  --  84* 87*  --   --  90*  --   --   --   --   --   --  91*   BANDS PCT %  --   --   --   --   --   --   --   --   --  14* 23*  --   --    MONOS PCT %  --  10 7  --   --  6  --   --   --   --   --   --  3*   MONO PCT %  --   --   --   --   --   --   --  1*  --  2* 3*  --   -- < > = values in this interval not displayed  Results from last 7 days   Lab Units 01/16/21 1819 01/16/21  0743 01/16/21  0437 01/15/21  2002 01/15/21  1723 01/15/21  0557 01/14/21  2359  01/13/21  0440  01/11/21  1422   SODIUM mmol/L 145 147* 147* 149* 150* 146* 146*   < > 151*  151*   < > 150*   POTASSIUM mmol/L 3 7 4 1 3 9 4 1 4 0 3 9 4 3   < > 4 6  4 6   < > 3 3*   CHLORIDE mmol/L 115* 119* 119* 120* 121* 118* 117*   < > 122*  120*   < > 111*   CO2 mmol/L 24 23 23 22 23 23 22   < > 21  21   < > 10*   CO2, I-STAT   --   --   --   --   --   --   --    < >  --    < >  --    ANION GAP mmol/L 6 5 5 7 6 5 7   < > 8  10   < > 29*   BUN mg/dL 22 21 20 19 18 19 18   < > 29*  30*   < > 64*   CREATININE mg/dL 0 78 0 65 0 66 0 58* 0 58* 0 62 0 61   < > 0 83  0 75   < > 1 43*   CALCIUM mg/dL 7 3* 7 3* 7 1* 7 4* 7 5* 6 8* 6 8*   < > 7 0*  7 2*   < > 9 2   GLUCOSE RANDOM mg/dL 143* 159* 145* 166* 124 155* 183*   < > 172*  172*   < > 550*   ALT U/L  --   --  82*  --   --   --   --   --  33  --  25   AST U/L  --   --  139*  --   --   --   --   --  77*  --  29   ALK PHOS U/L  --   --  104  --   --   --   --   --  77  --  114   ALBUMIN g/dL  --   --  1 6*  --   --   --   --   --  1 4*  --  2 9*   TOTAL BILIRUBIN mg/dL  --   --  0 73  --   --   --   --   --  0 50  --  0 70    < > = values in this interval not displayed       Results from last 7 days   Lab Units 01/16/21 1819 01/16/21  0743 01/15/21  2002 01/15/21  1723 01/15/21  0557 01/14/21  1154 01/14/21  0408   MAGNESIUM mg/dL 1 8 2 0 2 1 2 1 2 4 2 3 2 5   PHOSPHORUS mg/dL 2 3 2 0* 2 3 2 2* 1 3* 1 5* 2 4      Results from last 7 days   Lab Units 01/17/21  0507 01/16/21  1730 01/16/21  1159 01/16/21  0437 01/15/21  1959 01/15/21  1232 01/15/21  0557 01/14/21  2356  01/12/21  1831 01/12/21  0327 01/11/21  1020   INR   --   --   --   --   --   --   --  1 44*  --  1 70* 1 52* 1 38*   PTT seconds 57* 65* 82* 56* 118* 59* 61* 37   < > 34 29 26    < > = values in this interval not displayed  Results from last 7 days   Lab Units 01/11/21  1020   TROPONIN I ng/mL <0 03     Results from last 7 days   Lab Units 01/13/21  2328 01/13/21  1205 01/12/21  1301 01/12/21  1008 01/12/21  0322 01/12/21  0206 01/11/21  2303   LACTIC ACID mmol/L 1 4 1 7 2 4* 2 8* 4 6* 3 3* 4 9*     ABG:  Results from last 7 days   Lab Units 01/16/21  1307   PH ART  7 511*   PCO2 ART mm Hg 26 7*   PO2 ART mm Hg 69 6*   HCO3 ART mmol/L 20 9*   BASE EXC ART mmol/L -1 5   ABG SOURCE  Line, Arterial     VBG:  Results from last 7 days   Lab Units 01/16/21  1307  01/12/21  1407   PH REAL   --   --  7 322   PCO2 REAL mm Hg  --   --  33 3*   PO2 REAL mm Hg  --   --  70 6*   HCO3 REAL mmol/L  --   --  16 8*   BASE EXC REAL mmol/L  --   --  -8 3   ABG SOURCE  Line, Arterial   < >  --     < > = values in this interval not displayed  Results from last 7 days   Lab Units 01/14/21  0408 01/13/21  0440 01/12/21  0638 01/11/21  1151   PROCALCITONIN ng/ml 1 13* 1 64* 0 65* 0 31*       Micro  Results from last 7 days   Lab Units 01/15/21  0621 01/15/21  0620 01/12/21  0640 01/12/21  0206 01/11/21  1937 01/11/21  1926 01/11/21  1020   BLOOD CULTURE  No Growth at 24 hrs  No Growth at 24 hrs   --   --   --  No Growth After 5 Days  No Growth After 5 Days  No Growth After 5 Days  No Growth After 5 Days     URINE CULTURE   --   --  No Growth <1000 cfu/mL  --   --   --   --    MRSA CULTURE ONLY   --   --   --   --  No Methicillin Resistant Staphlyococcus aureus (MRSA) isolated  --   --    LEGIONELLA URINARY ANTIGEN   --   --   --  Negative  --   --   --    STREP PNEUMONIAE ANTIGEN, URINE   --   --   --  Negative  --   --   --        EKG: N/A  Imaging: N/A     Intake and Output  I/O       01/15 0701 - 01/16 0700 01/16 0701 - 01/17 0700    I V  (mL/kg) 3752 8 (45 2) 1305 5 (15 9)    Blood 371     NG/ 920    IV Piggyback 965 300    Feedings 466 987    Total Intake(mL/kg) 5834 8 (70 2) 3512 5 (42 7)    Urine (mL/kg/hr) 2395 (1 2) 4070 (2 1)    Emesis/NG output 0     Total Output 2395 4070    Net +3439 8 -557 6              UOP: 170 ml/hr     Height and Weights   Height: 5' 6" (167 6 cm)  IBW: 63 8 kg  Body mass index is 29 28 kg/m²  Weight (last 2 days)     Date/Time   Weight    01/17/21 0535   82 3 (181 44)    01/16/21 0600   83 1 (183 2)    01/15/21 1440   85 1 (187 61)                Nutrition       Diet Orders   (From admission, onward)             Start     Ordered    01/15/21 1927  Diet Enteral/Parenteral; Tube Feeding No Oral Diet; Glucerna 1 2; Continuous; 56; 200; Water; Every 6 hours  Diet effective now     Comments: Glucerna 1 2 at 10 ml/hr and increase by 10 ml q 4 hrs as tolerated to goal rate of 56 ml/hr to provide qd:1344 ml,1613 Kcal,81 gm PRO,154 gm CHO,81 gm Fat,1082 ml Free H2O,1 3 mg CHO/kg/min  Question Answer Comment   Diet Type Enteral/Parenteral    Enteral/Parenteral Tube Feeding No Oral Diet    Tube Feeding Formula: Glucerna 1 2    Bolus/Cyclic/Continuous Continuous    Tube Feeding Goal Rate (mL/hr): 56    Tube Feeding flush (mL): 200    Water Flush type: Water    Water flush frequency: Every 6 hours    RD to adjust diet per protocol? Yes        01/15/21 1927              TF currently running at 56 ml/hr with a goal of 56 ml/hr   Formula: Glucerna 1 2       Active Medications  Scheduled Meds:  Current Facility-Administered Medications   Medication Dose Route Frequency Provider Last Rate    ascorbic acid  1,000 mg Oral Q12H Alex Romero MD      atorvastatin  40 mg Oral HS Pranav Wren MD      bisacodyl  10 mg Rectal Daily PRN Richie Prather MD      cefepime  2,000 mg Intravenous Q12H Pranav Wren MD 2,000 mg (01/16/21 2124)    chlorhexidine  15 mL Swish & Spit Q12H Alex Romero MD      cholecalciferol  2,000 Units Oral Daily Pranav Wren MD      dexamethasone  0 1 mg/kg Intravenous Q12H Pranav Wren MD      heparin (porcine)  3-30 Units/kg/hr (Order-Specific) Intravenous Titrated Yamileth Todd MD 12 Units/kg/hr (01/17/21 1776)    heparin (porcine)  3,000 Units Intravenous Q1H PRN Yamileth Todd MD      heparin (porcine)  6,000 Units Intravenous Q1H PRN Yamileth Todd MD      HYDROmorphone  2 mg/hr Intravenous Continuous Pauline José MD 2 mg/hr (01/16/21 0105)    HYDROmorphone  2 mg Intravenous Q2H PRN Pauline José MD      insulin regular (HumuLIN R,NovoLIN R) infusion  0 3-21 Units/hr Intravenous Titrated Yamileth Todd MD 6 Units/hr (01/17/21 0450)    ketamine  1 mg/kg/hr Intravenous Continuous Donal Sidhu MD 1 mg/kg/hr (01/17/21 0328)    levalbuterol  1 25 mg Nebulization Q6H Yamileth Todd MD      midazolam  1 mg Intravenous Q1H PRN Yamileth Todd MD      zinc sulfate  220 mg Oral Daily Yamileth Todd MD      Followed by   Claudia Po ON 1/19/2021] multivitamin-minerals  1 tablet Oral Daily Yamileth Todd MD      norepinephrine           oxyCODONE  10 mg Oral Q6H PRN Pauline José MD      pantoprazole  40 mg Intravenous Q12H [address] Pauline José MD      polyethylene glycol  17 g Oral Daily Pauline José MD      propofol  5-50 mcg/kg/min Intravenous Titrated Yamileth Todd MD 30 mcg/kg/min (01/17/21 0448)    senna  2 tablet Oral BID Pauline José MD      sodium chloride  4 mL Nebulization Q6H Yamileth Todd MD       Continuous Infusions:  heparin (porcine), 3-30 Units/kg/hr (Order-Specific), Last Rate: 12 Units/kg/hr (01/17/21 0548)  HYDROmorphone, 2 mg/hr, Last Rate: 2 mg/hr (01/16/21 0105)  insulin regular (HumuLIN R,NovoLIN R) infusion, 0 3-21 Units/hr, Last Rate: 6 Units/hr (01/17/21 0450)  ketamine, 1 mg/kg/hr, Last Rate: 1 mg/kg/hr (01/17/21 0328)  propofol, 5-50 mcg/kg/min, Last Rate: 30 mcg/kg/min (01/17/21 0448)      PRN Meds:   bisacodyl, 10 mg, Daily PRN  heparin (porcine), 3,000 Units, Q1H PRN  heparin (porcine), 6,000 Units, Q1H PRN  HYDROmorphone, 2 mg, Q2H PRN  midazolam, 1 mg, Q1H PRN  oxyCODONE, 10 mg, Q6H PRN        Allergies   Allergies   Allergen Reactions    Varenicline      ---------------------------------------------------------------------------------------  Advance Directive and Living Will:      Power of :    POLST:    ---------------------------------------------------------------------------------------  Max Astudillo MD      Portions of the record may have been created with voice recognition software  Occasional wrong word or "sound a like" substitutions may have occurred due to the inherent limitations of voice recognition software    Read the chart carefully and recognize, using context, where substitutions have occurred

## 2021-01-18 ENCOUNTER — ANESTHESIA (INPATIENT)
Dept: PERIOP | Facility: HOSPITAL | Age: 61
DRG: 853 | End: 2021-01-18
Payer: COMMERCIAL

## 2021-01-18 VITALS — HEART RATE: 88 BPM

## 2021-01-18 LAB
ABO GROUP BLD: NORMAL
AMMONIA PLAS-SCNC: 49 UMOL/L (ref 11–35)
ANION GAP SERPL CALCULATED.3IONS-SCNC: 3 MMOL/L (ref 4–13)
ANION GAP SERPL CALCULATED.3IONS-SCNC: 6 MMOL/L (ref 4–13)
ANISOCYTOSIS BLD QL SMEAR: PRESENT
APTT PPP: 107 SECONDS (ref 23–37)
APTT PPP: 66 SECONDS (ref 23–37)
APTT PPP: 73 SECONDS (ref 23–37)
BASOPHILS # BLD MANUAL: 0 THOUSAND/UL (ref 0–0.1)
BASOPHILS NFR MAR MANUAL: 0 % (ref 0–1)
BLD GP AB SCN SERPL QL: NEGATIVE
BUN SERPL-MCNC: 18 MG/DL (ref 5–25)
BUN SERPL-MCNC: 22 MG/DL (ref 5–25)
BURR CELLS BLD QL SMEAR: PRESENT
CA-I BLD-SCNC: 0.97 MMOL/L (ref 1.12–1.32)
CALCIUM SERPL-MCNC: 7.1 MG/DL (ref 8.3–10.1)
CALCIUM SERPL-MCNC: 7.6 MG/DL (ref 8.3–10.1)
CHLORIDE SERPL-SCNC: 111 MMOL/L (ref 100–108)
CHLORIDE SERPL-SCNC: 111 MMOL/L (ref 100–108)
CO2 SERPL-SCNC: 25 MMOL/L (ref 21–32)
CO2 SERPL-SCNC: 26 MMOL/L (ref 21–32)
CREAT SERPL-MCNC: 0.74 MG/DL (ref 0.6–1.3)
CREAT SERPL-MCNC: 0.77 MG/DL (ref 0.6–1.3)
EOSINOPHIL # BLD MANUAL: 0 THOUSAND/UL (ref 0–0.4)
EOSINOPHIL NFR BLD MANUAL: 0 % (ref 0–6)
ERYTHROCYTE [DISTWIDTH] IN BLOOD BY AUTOMATED COUNT: 14.3 % (ref 11.6–15.1)
ERYTHROCYTE [DISTWIDTH] IN BLOOD BY AUTOMATED COUNT: 15.1 % (ref 11.6–15.1)
GFR SERPL CREATININE-BSD FRML MDRD: 97 ML/MIN/1.73SQ M
GFR SERPL CREATININE-BSD FRML MDRD: 99 ML/MIN/1.73SQ M
GLUCOSE SERPL-MCNC: 124 MG/DL (ref 65–140)
GLUCOSE SERPL-MCNC: 127 MG/DL (ref 65–140)
GLUCOSE SERPL-MCNC: 133 MG/DL (ref 65–140)
GLUCOSE SERPL-MCNC: 136 MG/DL (ref 65–140)
GLUCOSE SERPL-MCNC: 141 MG/DL (ref 65–140)
GLUCOSE SERPL-MCNC: 158 MG/DL (ref 65–140)
GLUCOSE SERPL-MCNC: 159 MG/DL (ref 65–140)
GLUCOSE SERPL-MCNC: 172 MG/DL (ref 65–140)
GLUCOSE SERPL-MCNC: 179 MG/DL (ref 65–140)
GLUCOSE SERPL-MCNC: 186 MG/DL (ref 65–140)
GLUCOSE SERPL-MCNC: 188 MG/DL (ref 65–140)
GLUCOSE SERPL-MCNC: 210 MG/DL (ref 65–140)
GLUCOSE SERPL-MCNC: 216 MG/DL (ref 65–140)
GLUCOSE SERPL-MCNC: 221 MG/DL (ref 65–140)
HCT VFR BLD AUTO: 21.3 % (ref 36.5–49.3)
HCT VFR BLD AUTO: 25.8 % (ref 36.5–49.3)
HGB BLD-MCNC: 6.8 G/DL (ref 12–17)
HGB BLD-MCNC: 7.7 G/DL (ref 12–17)
HGB BLD-MCNC: 8.3 G/DL (ref 12–17)
LYMPHOCYTES # BLD AUTO: 0.19 THOUSAND/UL (ref 0.6–4.47)
LYMPHOCYTES # BLD AUTO: 1 % (ref 14–44)
MAGNESIUM SERPL-MCNC: 2.1 MG/DL (ref 1.6–2.6)
MCH RBC QN AUTO: 29.2 PG (ref 26.8–34.3)
MCH RBC QN AUTO: 29.4 PG (ref 26.8–34.3)
MCHC RBC AUTO-ENTMCNC: 31.9 G/DL (ref 31.4–37.4)
MCHC RBC AUTO-ENTMCNC: 32.2 G/DL (ref 31.4–37.4)
MCV RBC AUTO: 91 FL (ref 82–98)
MCV RBC AUTO: 92 FL (ref 82–98)
MONOCYTES # BLD AUTO: 0.19 THOUSAND/UL (ref 0–1.22)
MONOCYTES NFR BLD: 1 % (ref 4–12)
NEUTROPHILS # BLD MANUAL: 18.25 THOUSAND/UL (ref 1.85–7.62)
NEUTS BAND NFR BLD MANUAL: 2 % (ref 0–8)
NEUTS SEG NFR BLD AUTO: 95 % (ref 43–75)
NRBC BLD AUTO-RTO: 0 /100 WBCS
NRBC BLD AUTO-RTO: 0 /100 WBCS
PHOSPHATE SERPL-MCNC: 3.3 MG/DL (ref 2.3–4.1)
PLATELET # BLD AUTO: 155 THOUSANDS/UL (ref 149–390)
PLATELET # BLD AUTO: 159 THOUSANDS/UL (ref 149–390)
PLATELET BLD QL SMEAR: ADEQUATE
PMV BLD AUTO: 12.3 FL (ref 8.9–12.7)
PMV BLD AUTO: 12.3 FL (ref 8.9–12.7)
POIKILOCYTOSIS BLD QL SMEAR: PRESENT
POTASSIUM SERPL-SCNC: 4.5 MMOL/L (ref 3.5–5.3)
POTASSIUM SERPL-SCNC: 4.7 MMOL/L (ref 3.5–5.3)
RBC # BLD AUTO: 2.31 MILLION/UL (ref 3.88–5.62)
RBC # BLD AUTO: 2.84 MILLION/UL (ref 3.88–5.62)
RBC MORPH BLD: PRESENT
RH BLD: POSITIVE
SODIUM SERPL-SCNC: 140 MMOL/L (ref 136–145)
SODIUM SERPL-SCNC: 142 MMOL/L (ref 136–145)
SPECIMEN EXPIRATION DATE: NORMAL
TRIGL SERPL-MCNC: 315 MG/DL
VARIANT LYMPHS # BLD AUTO: 1 %
WBC # BLD AUTO: 17.84 THOUSAND/UL (ref 4.31–10.16)
WBC # BLD AUTO: 18.81 THOUSAND/UL (ref 4.31–10.16)

## 2021-01-18 PROCEDURE — 94640 AIRWAY INHALATION TREATMENT: CPT

## 2021-01-18 PROCEDURE — 80048 BASIC METABOLIC PNL TOTAL CA: CPT | Performed by: FAMILY MEDICINE

## 2021-01-18 PROCEDURE — 85007 BL SMEAR W/DIFF WBC COUNT: CPT | Performed by: INTERNAL MEDICINE

## 2021-01-18 PROCEDURE — C9113 INJ PANTOPRAZOLE SODIUM, VIA: HCPCS | Performed by: SURGERY

## 2021-01-18 PROCEDURE — 80048 BASIC METABOLIC PNL TOTAL CA: CPT | Performed by: INTERNAL MEDICINE

## 2021-01-18 PROCEDURE — 85018 HEMOGLOBIN: CPT | Performed by: EMERGENCY MEDICINE

## 2021-01-18 PROCEDURE — 82948 REAGENT STRIP/BLOOD GLUCOSE: CPT

## 2021-01-18 PROCEDURE — 86900 BLOOD TYPING SEROLOGIC ABO: CPT | Performed by: INTERNAL MEDICINE

## 2021-01-18 PROCEDURE — 86901 BLOOD TYPING SEROLOGIC RH(D): CPT | Performed by: INTERNAL MEDICINE

## 2021-01-18 PROCEDURE — 85730 THROMBOPLASTIN TIME PARTIAL: CPT | Performed by: INTERNAL MEDICINE

## 2021-01-18 PROCEDURE — 94760 N-INVAS EAR/PLS OXIMETRY 1: CPT

## 2021-01-18 PROCEDURE — 85730 THROMBOPLASTIN TIME PARTIAL: CPT | Performed by: PHYSICIAN ASSISTANT

## 2021-01-18 PROCEDURE — 82330 ASSAY OF CALCIUM: CPT | Performed by: FAMILY MEDICINE

## 2021-01-18 PROCEDURE — 94003 VENT MGMT INPAT SUBQ DAY: CPT

## 2021-01-18 PROCEDURE — C9113 INJ PANTOPRAZOLE SODIUM, VIA: HCPCS | Performed by: EMERGENCY MEDICINE

## 2021-01-18 PROCEDURE — 30233N1 TRANSFUSION OF NONAUTOLOGOUS RED BLOOD CELLS INTO PERIPHERAL VEIN, PERCUTANEOUS APPROACH: ICD-10-PCS | Performed by: INTERNAL MEDICINE

## 2021-01-18 PROCEDURE — 83735 ASSAY OF MAGNESIUM: CPT | Performed by: INTERNAL MEDICINE

## 2021-01-18 PROCEDURE — 82140 ASSAY OF AMMONIA: CPT | Performed by: INTERNAL MEDICINE

## 2021-01-18 PROCEDURE — 86850 RBC ANTIBODY SCREEN: CPT | Performed by: INTERNAL MEDICINE

## 2021-01-18 PROCEDURE — 84100 ASSAY OF PHOSPHORUS: CPT | Performed by: INTERNAL MEDICINE

## 2021-01-18 PROCEDURE — 86923 COMPATIBILITY TEST ELECTRIC: CPT

## 2021-01-18 PROCEDURE — 85027 COMPLETE CBC AUTOMATED: CPT | Performed by: FAMILY MEDICINE

## 2021-01-18 PROCEDURE — 0YQCXZZ REPAIR RIGHT UPPER LEG, EXTERNAL APPROACH: ICD-10-PCS | Performed by: SURGERY

## 2021-01-18 PROCEDURE — 84478 ASSAY OF TRIGLYCERIDES: CPT | Performed by: INTERNAL MEDICINE

## 2021-01-18 PROCEDURE — 85027 COMPLETE CBC AUTOMATED: CPT | Performed by: INTERNAL MEDICINE

## 2021-01-18 PROCEDURE — P9016 RBC LEUKOCYTES REDUCED: HCPCS

## 2021-01-18 PROCEDURE — 99024 POSTOP FOLLOW-UP VISIT: CPT | Performed by: SURGERY

## 2021-01-18 PROCEDURE — 27590 AMPUTATE LEG AT THIGH: CPT | Performed by: SURGERY

## 2021-01-18 PROCEDURE — 99291 CRITICAL CARE FIRST HOUR: CPT | Performed by: INTERNAL MEDICINE

## 2021-01-18 RX ORDER — CEFAZOLIN SODIUM 2 G/50ML
SOLUTION INTRAVENOUS AS NEEDED
Status: DISCONTINUED | OUTPATIENT
Start: 2021-01-18 | End: 2021-01-18

## 2021-01-18 RX ORDER — DOXYCYCLINE HYCLATE 100 MG/1
100 CAPSULE ORAL EVERY 12 HOURS SCHEDULED
Status: DISCONTINUED | OUTPATIENT
Start: 2021-01-18 | End: 2021-01-21

## 2021-01-18 RX ORDER — METHADONE HYDROCHLORIDE 10 MG/ML
10 INJECTION, SOLUTION INTRAMUSCULAR; INTRAVENOUS; SUBCUTANEOUS EVERY 8 HOURS SCHEDULED
Status: DISCONTINUED | OUTPATIENT
Start: 2021-01-18 | End: 2021-01-27

## 2021-01-18 RX ORDER — MIDAZOLAM HYDROCHLORIDE 2 MG/2ML
INJECTION, SOLUTION INTRAMUSCULAR; INTRAVENOUS AS NEEDED
Status: DISCONTINUED | OUTPATIENT
Start: 2021-01-18 | End: 2021-01-18

## 2021-01-18 RX ORDER — LEVALBUTEROL 1.25 MG/.5ML
1.25 SOLUTION, CONCENTRATE RESPIRATORY (INHALATION)
Status: DISCONTINUED | OUTPATIENT
Start: 2021-01-18 | End: 2021-01-25

## 2021-01-18 RX ORDER — SODIUM CHLORIDE 9 MG/ML
INJECTION, SOLUTION INTRAVENOUS CONTINUOUS PRN
Status: DISCONTINUED | OUTPATIENT
Start: 2021-01-18 | End: 2021-01-18

## 2021-01-18 RX ORDER — ROCURONIUM BROMIDE 10 MG/ML
INJECTION, SOLUTION INTRAVENOUS AS NEEDED
Status: DISCONTINUED | OUTPATIENT
Start: 2021-01-18 | End: 2021-01-18

## 2021-01-18 RX ORDER — GABAPENTIN 100 MG/1
100 CAPSULE ORAL 3 TIMES DAILY
Status: DISCONTINUED | OUTPATIENT
Start: 2021-01-18 | End: 2021-01-20

## 2021-01-18 RX ORDER — EPHEDRINE SULFATE 50 MG/ML
INJECTION INTRAVENOUS AS NEEDED
Status: DISCONTINUED | OUTPATIENT
Start: 2021-01-18 | End: 2021-01-18

## 2021-01-18 RX ORDER — FENTANYL CITRATE 50 UG/ML
INJECTION, SOLUTION INTRAMUSCULAR; INTRAVENOUS AS NEEDED
Status: DISCONTINUED | OUTPATIENT
Start: 2021-01-18 | End: 2021-01-18

## 2021-01-18 RX ORDER — HYDROMORPHONE HCL/PF 1 MG/ML
2 SYRINGE (ML) INJECTION EVERY 2 HOUR PRN
Status: DISCONTINUED | OUTPATIENT
Start: 2021-01-18 | End: 2021-01-19

## 2021-01-18 RX ADMIN — PROPOFOL 50 MCG/KG/MIN: 10 INJECTION, EMULSION INTRAVENOUS at 21:37

## 2021-01-18 RX ADMIN — METHADONE HYDROCHLORIDE 10 MG: 10 INJECTION, SOLUTION INTRAMUSCULAR; INTRAVENOUS; SUBCUTANEOUS at 21:37

## 2021-01-18 RX ADMIN — CEFAZOLIN SODIUM 2000 MG: 2 SOLUTION INTRAVENOUS at 10:40

## 2021-01-18 RX ADMIN — NOREPINEPHRINE BITARTRATE 4 MCG/MIN: 1 INJECTION, SOLUTION, CONCENTRATE INTRAVENOUS at 13:58

## 2021-01-18 RX ADMIN — PROPOFOL 50 MCG/KG/MIN: 10 INJECTION, EMULSION INTRAVENOUS at 02:01

## 2021-01-18 RX ADMIN — ACETAMINOPHEN 650 MG: 325 TABLET, FILM COATED ORAL at 03:50

## 2021-01-18 RX ADMIN — OXYCODONE HYDROCHLORIDE AND ACETAMINOPHEN 1000 MG: 500 TABLET ORAL at 08:00

## 2021-01-18 RX ADMIN — ZINC SULFATE 220 MG (50 MG) CAPSULE 220 MG: CAPSULE at 08:00

## 2021-01-18 RX ADMIN — CALCIUM CHLORIDE 1 G: 100 INJECTION, SOLUTION INTRAVENOUS; INTRAVENTRICULAR at 20:27

## 2021-01-18 RX ADMIN — CHLORHEXIDINE GLUCONATE 0.12% ORAL RINSE 15 ML: 1.2 LIQUID ORAL at 08:00

## 2021-01-18 RX ADMIN — DOXYCYCLINE 100 MG: 100 CAPSULE ORAL at 13:58

## 2021-01-18 RX ADMIN — PANTOPRAZOLE SODIUM 40 MG: 40 INJECTION, POWDER, FOR SOLUTION INTRAVENOUS at 08:00

## 2021-01-18 RX ADMIN — PROPOFOL 50 MCG/KG/MIN: 10 INJECTION, EMULSION INTRAVENOUS at 17:04

## 2021-01-18 RX ADMIN — KETAMINE HYDROCHLORIDE 1 MG/KG/HR: 50 INJECTION INTRAMUSCULAR; INTRAVENOUS at 22:37

## 2021-01-18 RX ADMIN — EPHEDRINE SULFATE 10 MG: 50 INJECTION, SOLUTION INTRAVENOUS at 11:47

## 2021-01-18 RX ADMIN — LEVALBUTEROL HYDROCHLORIDE 1.25 MG: 1.25 SOLUTION, CONCENTRATE RESPIRATORY (INHALATION) at 19:10

## 2021-01-18 RX ADMIN — PROPOFOL 50 MCG/KG/MIN: 10 INJECTION, EMULSION INTRAVENOUS at 05:46

## 2021-01-18 RX ADMIN — LEVALBUTEROL HYDROCHLORIDE 1.25 MG: 1.25 SOLUTION, CONCENTRATE RESPIRATORY (INHALATION) at 01:39

## 2021-01-18 RX ADMIN — Medication 2 MG/HR: at 03:33

## 2021-01-18 RX ADMIN — SODIUM CHLORIDE SOLN NEBU 3% 4 ML: 3 NEBU SOLN at 13:39

## 2021-01-18 RX ADMIN — PHENYLEPHRINE HYDROCHLORIDE 100 MCG: 10 INJECTION INTRAVENOUS at 11:39

## 2021-01-18 RX ADMIN — ROCURONIUM BROMIDE 30 MG: 50 INJECTION, SOLUTION INTRAVENOUS at 10:16

## 2021-01-18 RX ADMIN — CEFTRIAXONE 1000 MG: 1 INJECTION, POWDER, FOR SOLUTION INTRAMUSCULAR; INTRAVENOUS at 13:58

## 2021-01-18 RX ADMIN — SENNOSIDES 17.2 MG: 8.6 TABLET, FILM COATED ORAL at 08:00

## 2021-01-18 RX ADMIN — CHLORHEXIDINE GLUCONATE 0.12% ORAL RINSE 15 ML: 1.2 LIQUID ORAL at 20:25

## 2021-01-18 RX ADMIN — GABAPENTIN 100 MG: 100 CAPSULE ORAL at 17:04

## 2021-01-18 RX ADMIN — FENTANYL CITRATE 100 MCG: 50 INJECTION INTRAMUSCULAR; INTRAVENOUS at 10:57

## 2021-01-18 RX ADMIN — ROCURONIUM BROMIDE 20 MG: 50 INJECTION, SOLUTION INTRAVENOUS at 10:59

## 2021-01-18 RX ADMIN — SENNOSIDES 17.2 MG: 8.6 TABLET, FILM COATED ORAL at 17:04

## 2021-01-18 RX ADMIN — PANTOPRAZOLE SODIUM 40 MG: 40 INJECTION, POWDER, FOR SOLUTION INTRAVENOUS at 20:26

## 2021-01-18 RX ADMIN — SODIUM CHLORIDE 6 UNITS/HR: 9 INJECTION, SOLUTION INTRAVENOUS at 03:28

## 2021-01-18 RX ADMIN — Medication 2000 UNITS: at 08:00

## 2021-01-18 RX ADMIN — EPHEDRINE SULFATE 10 MG: 50 INJECTION, SOLUTION INTRAVENOUS at 11:55

## 2021-01-18 RX ADMIN — SUGAMMADEX 200 MG: 100 INJECTION, SOLUTION INTRAVENOUS at 11:50

## 2021-01-18 RX ADMIN — SODIUM CHLORIDE 9 UNITS/HR: 9 INJECTION, SOLUTION INTRAVENOUS at 20:28

## 2021-01-18 RX ADMIN — ATORVASTATIN CALCIUM 40 MG: 40 TABLET, FILM COATED ORAL at 20:27

## 2021-01-18 RX ADMIN — PROPOFOL 50 MCG/KG/MIN: 10 INJECTION, EMULSION INTRAVENOUS at 07:37

## 2021-01-18 RX ADMIN — LEVALBUTEROL HYDROCHLORIDE 1.25 MG: 1.25 SOLUTION, CONCENTRATE RESPIRATORY (INHALATION) at 13:40

## 2021-01-18 RX ADMIN — PHENYLEPHRINE HYDROCHLORIDE 100 MCG: 10 INJECTION INTRAVENOUS at 11:55

## 2021-01-18 RX ADMIN — Medication 2 MG/HR: at 12:47

## 2021-01-18 RX ADMIN — PROPOFOL 50 MCG/KG/MIN: 10 INJECTION, EMULSION INTRAVENOUS at 23:30

## 2021-01-18 RX ADMIN — SODIUM CHLORIDE SOLN NEBU 3% 4 ML: 3 NEBU SOLN at 01:38

## 2021-01-18 RX ADMIN — SODIUM CHLORIDE SOLN NEBU 3% 4 ML: 3 NEBU SOLN at 19:10

## 2021-01-18 RX ADMIN — SODIUM CHLORIDE: 0.9 INJECTION, SOLUTION INTRAVENOUS at 10:16

## 2021-01-18 RX ADMIN — DEXAMETHASONE SODIUM PHOSPHATE 7.3 MG: 10 INJECTION, SOLUTION INTRAMUSCULAR; INTRAVENOUS at 20:26

## 2021-01-18 RX ADMIN — KETAMINE HYDROCHLORIDE 1 MG/KG/HR: 50 INJECTION INTRAMUSCULAR; INTRAVENOUS at 09:57

## 2021-01-18 RX ADMIN — KETAMINE HYDROCHLORIDE 1 MG/KG/HR: 50 INJECTION INTRAMUSCULAR; INTRAVENOUS at 16:52

## 2021-01-18 RX ADMIN — HEPARIN SODIUM 14 UNITS/KG/HR: 10000 INJECTION, SOLUTION INTRAVENOUS at 03:28

## 2021-01-18 RX ADMIN — POLYETHYLENE GLYCOL 3350 17 G: 17 POWDER, FOR SOLUTION ORAL at 08:00

## 2021-01-18 RX ADMIN — DOXYCYCLINE 100 MG: 100 CAPSULE ORAL at 20:25

## 2021-01-18 RX ADMIN — PHENYLEPHRINE HYDROCHLORIDE 100 MCG: 10 INJECTION INTRAVENOUS at 10:59

## 2021-01-18 RX ADMIN — LEVALBUTEROL HYDROCHLORIDE 1.25 MG: 1.25 SOLUTION, CONCENTRATE RESPIRATORY (INHALATION) at 07:16

## 2021-01-18 RX ADMIN — EPHEDRINE SULFATE 10 MG: 50 INJECTION, SOLUTION INTRAVENOUS at 10:59

## 2021-01-18 RX ADMIN — METHADONE HYDROCHLORIDE 10 MG: 10 INJECTION, SOLUTION INTRAMUSCULAR; INTRAVENOUS; SUBCUTANEOUS at 17:04

## 2021-01-18 RX ADMIN — PHENYLEPHRINE HYDROCHLORIDE 100 MCG: 10 INJECTION INTRAVENOUS at 11:47

## 2021-01-18 RX ADMIN — DEXAMETHASONE SODIUM PHOSPHATE 7.3 MG: 10 INJECTION, SOLUTION INTRAMUSCULAR; INTRAVENOUS at 08:01

## 2021-01-18 RX ADMIN — GABAPENTIN 100 MG: 100 CAPSULE ORAL at 20:26

## 2021-01-18 RX ADMIN — MIDAZOLAM 2 MG: 1 INJECTION INTRAMUSCULAR; INTRAVENOUS at 10:16

## 2021-01-18 RX ADMIN — EPHEDRINE SULFATE 10 MG: 50 INJECTION, SOLUTION INTRAVENOUS at 11:39

## 2021-01-18 RX ADMIN — SODIUM CHLORIDE SOLN NEBU 3% 4 ML: 3 NEBU SOLN at 07:16

## 2021-01-18 RX ADMIN — BISACODYL 10 MG: 10 SUPPOSITORY RECTAL at 17:04

## 2021-01-18 RX ADMIN — KETAMINE HYDROCHLORIDE 1 MG/KG/HR: 50 INJECTION INTRAMUSCULAR; INTRAVENOUS at 05:46

## 2021-01-18 NOTE — PHYSICAL THERAPY NOTE
Physical Therapy Cancellation Note    PT orders received chart review completed  Pt is currently intubated/sedated and not appropriate to participate in skilled PT at this time  PT will follow and eval as medically appropriate       01/18/21 0800   PT Last Visit   PT Visit Date 01/18/21   Note Type   Cancel Reasons Intubated/sedated     Migue Vides, PT

## 2021-01-18 NOTE — RESPIRATORY THERAPY NOTE
RT Ventilator Management Note  Leonia Harada 56 y o  male MRN: [id number]  Unit/Bed#: [facility] 11 Encounter: [id number]      Daily Screen       1/17/2021  1533 1/18/2021  0725          Patient safety screen outcome[de-identified]  Failed  Failed      Not Ready for Weaning due to[de-identified]  Underline problem not resolved  Underline problem not resolved              Physical Exam:   Assessment Type: Assess only  General Appearance: Sedated  Respiratory Pattern: Tachypneic, Assisted  Chest Assessment: Chest expansion symmetrical  Bilateral Breath Sounds: Diminished, Coarse  Cough: Productive  Suction: ET Tube  O2 Device: Ventilator      Resp Comments: Pt continues on ASV settings  No acute resp distress noted  No changes made to settings at this time  Will continue to monitor per resp  protcol

## 2021-01-18 NOTE — PROGRESS NOTES
Daily Progress Note - Critical Care   Ivonne Ovalle 56 y o  male MRN: [id number]  Unit/Bed#: MICU 6 Encounter: [id number]        ----------------------------------------------------------------------------------------  HPI/24hr events:   56year old man with Hep C, DM2, HTN, HLP, and chronic methadone use  Found to be COVID-19 (+) 12/31  Presented for recurrent N/V at [facility]   Found to have DKA, transferred to [facility] on 1/11, treated DKA, treated for COVID on severe pathway  Had encephalopathy and increased WOB, intubated on 1/12   Also found to have hematemesis/coffee grounds prior to intubation and placed on PPI, EGD not performed  [place] with hematuria s/p cystoscopy and catheter placed  Cold RLE noted, vascular surgery consulted and started on UFH gtt and transferred to [facility] on 1/13 for further vascular surgery evaluation  Found to have acute limb ischemia secondary to emboli from aortic thrombus s/p AKA on 1/14  Overnight, pt remained intubated  Vent day 7  He had remarkably good UOP as well as soft BP's  Propofol 50, levo 4  No other acute events overnight      ---------------------------------------------------------------------------------------  SUBJECTIVE  This am, pt is sedated and not arousable       Review of systems was unable to be performed secondary to intubated and sedated  ---------------------------------------------------------------------------------------  Assessment and Plan:    Neuro:    Diagnosis: Toxic/metabolig encephalopathy, sedation and pain    o Propofol 50, ketamine 1, dilaudid 2, PRN versed and oxycodone   o Attempt to wean propofol, hold on weaning for now until after OR today  o Required versed 1 mg PRN 3 doses and dilaudid 2 mg PRN 3 doses over the past 24 hours    o Acute pain management onboard given chronic methadone use- spoke to pharmacy and apparently can give pt IV methadone to help wean ketamine (his baseline methadone 10 mg IV TID- see note)   o Goal RASS -2 to 0 o CAM-ICU   o Sleep-wake cycle regulation       CV:    Diagnosis: Aortic thrombus, history of hypertension, hypotension   o Goal MAP > 65, BP range /40-54  o Holding home BP meds   o Levophed gtt PRN, on levo 4 currently  o Continue closely monitoring hemodynamics, monitor tele  o Continue heparin drip  o Vascular surgery following       Pulm:   Diagnosis: Acute hypoxic respiratory failure 2/2 COVID19   o Current vent settings ASV 80%, PEEP 6, O2 60%   o Pt intubated on 1/12 due to hypoxia  o Continue pulm hygiene and suctioning   o Trend ABG   Diagnosis: Pulmonary embolus  o CTA 1/12 showing single small nonocclusive left lower pulmonary embolic filling defect identified as above   o On heparin gtt        GI:    Diagnosis: GERD, suspected upper GI bleed, hepatitis C   o No evidence of active bleeding  o Continue protonix BID  o Contact GI and endoscopy if blood in OGT or signs and symptoms of GIB  o Trend CBCs - from q12h to daily now    Tube feeds and bowel regimen   o Senokot and miralax daily, dulcolax suppository PRN  o Last BM not documented   o Tube feeds at goal, NPO for procedure today       :    Diagnosis: Gross hematuria, ANNALEE- resolved, UTI, likely myoglobinuria  - Moderate bacteria on urine microscopy on 1/12, cx negative   - Received antibiotics - now on ceftriaxone 1g q24h   - Trend BMP, Cr, strict I&O's   - Per urology, maintain rogers catheter for at least 7 days (1/19)   - UOP 3,800 last 24 hours, net positive 1,209 1, at goal 100 mL/hour      F/E/N:    Fluids: off fluids and diuresis    Electrolytes: trend an replete as needed   Nutrition: Continue tube feeds, NPO for procedure today       Heme/Onc:    Diagnosis:  Thrombocytopenia- improved   o Predated initiation of heparin  o No schistocytes noted on peripheral/hemolysis smear; however, poikilocytes, tear drop cells, linda cells, anisocytosis present  o Trend daily CBC- Hgb 8 3, Plt 155      Endo:    Diagnosis: T2DM, resolved DKA   o Insulin gtt   o Sugars controlled       ID:    Diagnosis: COVID19 infection  o Plan: severe COVID pathway   o Decadron 0 1 mg/kg day 7/10  o Continue zinc, vitamin D, vitamin C, statin  o Remdesivir not given  o Actemra not given   o Convalescent plasma not given   o On ceftriaxone 1g daily   · Leukocytosis and Fever   · Prelim WBC 18 8 from 10 55, diff pending, but did have fevers yesterday to 101 1 and overnight at 100 8   · Await diff, will discuss need for cultures   · Blood cx from 1/11 and 1/15 no growth   · CXR on 1/15 shows stable b/l opacities without new focal consolidation    MSK/Skin:    Diagnosis: Acute limb ischemia, rhabdomyolysis   o Duplex lower extremities on 1/12 with R femoral DVT, post intubation RLE found to be pale and pulseless, transferred here for vascular, CT with filling defect in descending aorta consistent with thrombus    o POD#4 S/p RLE AKA guilshuine amputation- per vascular surgery, taking pt to OR today for formalization   o Continue to turn and reposition frequently  o Pressure off-loading   o PT/OT   o Monitor UOP and Cr, CK trended down (39,664 on 1/14 to 2,573 on 1/17) - stopped trend  o Ultrasound of upper extremities negative for ischemia or DVT   o Vascular aware of blistering in RUE   · Pain management  o Acute pain management onboard given chronic methadone use- spoke to pharmacy and can give pt IV methadone to help wean ketamine (his baseline methadone 10 mg IV TID- see note)   o Consider gabapentin 100 mg PO TID crushed through OGT (home med)   o Because pt is on heparin drip, cannot place regional nerve block to help with pain of AKA stump    Disposition: Continue Critical Care   Code Status: Level 1 - Full Code  ---------------------------------------------------------------------------------------  ICU CORE MEASURES    Prophylaxis   VTE Pharmacologic Prophylaxis: Heparin  VTE Mechanical Prophylaxis: sequential compression device  Stress Ulcer Prophylaxis: Pantoprazole IV     ABCDE Protocol (if indicated)  Plan to perform spontaneous awakening trial today? No  Plan to perform spontaneous breathing trial today? No  Obvious barriers to extubation? Yes  CAM-ICU: Negative    Invasive Devices Review  Invasive Devices     Central Venous Catheter Line            CVC Central Lines 21 Triple Right 5 days          Peripheral Intravenous Line            Peripheral IV 01/15/21 Left Antecubital 3 days          Arterial Line            Arterial Line 21 Radial 5 days          Drain            Urethral Catheter 18 Fr  -- days    NG/OG/Enteral Tube Orogastric 16 Fr 6 days          Airway            ETT  Oral;Hi-Lo; Cuffed 7 5 mm 6 days              Can any invasive devices be discontinued today? No  ---------------------------------------------------------------------------------------  OBJECTIVE    Vitals   Vitals:    21 0412 21 0500 21 0555 21 0600   BP:       BP Location:       Pulse: 92 96  78   Resp: (!) 32 (!) 27  22   Temp: 100 4 °F (38 °C) 99 7 °F (37 6 °C)  99 °F (37 2 °C)   TempSrc:       SpO2: 93% 95%  98%   Weight:   82 4 kg (181 lb 10 5 oz)    Height:         Temp (24hrs), Av 8 °F (37 7 °C), Min:99 °F (37 2 °C), Max:101 1 °F (38 4 °C)  Current: Temperature: 99 °F (37 2 °C)    Respiratory:  SpO2 Device: O2 Device: Ventilator       Invasive/non-invasive ventilation settings   Respiratory    Lab Data (Last 4 hours)    None         O2/Vent Data (Last 4 hours)    None                Physical Exam  Vitals signs and nursing note reviewed  Constitutional:       Appearance: Normal appearance  He is ill-appearing  HENT:      Head: Normocephalic and atraumatic  Right Ear: External ear normal       Left Ear: External ear normal    Neck:      Comments: RIJ central line in place without purulence or bleeding  Cardiovascular:      Rate and Rhythm: Normal rate and regular rhythm  Pulses: Normal pulses        Heart sounds: Normal heart sounds  No murmur  Pulmonary:      Effort: Pulmonary effort is normal  No respiratory distress  Breath sounds: No wheezing or rales  Comments: Mechanical breath sounds equal bilaterally  Abdominal:      General: Abdomen is flat  Bowel sounds are normal       Palpations: Abdomen is soft  Genitourinary:     Comments: Veras in place with yellow urine, no blood or clots noted  Musculoskeletal:         General: Swelling present  Comments: RLE AKA stump bandaged with weeping, no bleeding or purulence noted otherwise  RUE edematous with blistering   LUE and LLE pedal trace edema, nonpitting    Skin:     General: Skin is warm and dry  Capillary Refill: Capillary refill takes less than 2 seconds     Neurological:      Comments: Sedated and not arousable, does not respond to verbal or physical stimuli, does not withdraw to pain   Psychiatric:      Comments: sedated       Laboratory and Diagnostics:  Results from last 7 days   Lab Units 01/17/21  0755 01/16/21  1819 01/16/21  0743 01/16/21  0437 01/16/21  0109 01/15/21  2002 01/15/21  1723 01/15/21  0557 01/14/21  2359  01/13/21  1611 01/13/21  0440  01/12/21  0618   WBC Thousand/uL 10 55* 10 93* 10 66* 10 23*  --  8 90 9 54 13 61* 8 46   < > 7 65 6 71   < > 11 76*   HEMOGLOBIN g/dL 8 2* 7 9* 8 0* 8 1* 8 1* 6 9* 6 8* 7 5* 7 8*   < > 9 1* 9 7*   < > 12 9   I STAT HEMOGLOBIN   --   --   --   --   --   --   --   --   --    < >  --   --    < >  --    HEMATOCRIT % 24 2* 23 8* 24 1* 24 1* 24 1* 20 6* 20 6* 22 5* 23 5*   < > 26 5* 29 2*   < > 37 5   HEMATOCRIT, ISTAT   --   --   --   --   --   --   --   --   --    < >  --   --    < >  --    PLATELETS Thousands/uL 101* 76* 60* 61*  --  55* 58* 75* 40*   < > 34* 44*   < > 108*   NEUTROS PCT %  --   --  84* 87*  --   --  90*  --   --   --   --   --   --  91*   BANDS PCT %  --   --   --   --   --   --   --   --   --   --  14* 23*  --   --    MONOS PCT %  --   --  10 7  --   --  6  --   --   --   --   --   --  3* MONO PCT %  --   --   --   --   --   --   --   --  1*  --  2* 3*  --   --     < > = values in this interval not displayed  Results from last 7 days   Lab Units 01/17/21  0645 01/17/21  0507 01/16/21  1819 01/16/21  0743 01/16/21  0437 01/15/21  2002 01/15/21  1723  01/13/21  0440  01/11/21  1422   SODIUM mmol/L 143 143 145 147* 147* 149* 150*   < > 151*  151*   < > 150*   POTASSIUM mmol/L 4 1 4 2 3 7 4 1 3 9 4 1 4 0   < > 4 6  4 6   < > 3 3*   CHLORIDE mmol/L 113* 113* 115* 119* 119* 120* 121*   < > 122*  120*   < > 111*   CO2 mmol/L 24 26 24 23 23 22 23   < > 21  21   < > 10*   CO2, I-STAT   --   --   --   --   --   --   --    < >  --    < >  --    ANION GAP mmol/L 6 4 6 5 5 7 6   < > 8  10   < > 29*   BUN mg/dL 20 21 22 21 20 19 18   < > 29*  30*   < > 64*   CREATININE mg/dL 0 80 0 74 0 78 0 65 0 66 0 58* 0 58*   < > 0 83  0 75   < > 1 43*   CALCIUM mg/dL 7 5* 7 5* 7 3* 7 3* 7 1* 7 4* 7 5*   < > 7 0*  7 2*   < > 9 2   GLUCOSE RANDOM mg/dL 133 159* 143* 159* 145* 166* 124   < > 172*  172*   < > 550*   ALT U/L  --   --   --   --  82*  --   --   --  33  --  25   AST U/L  --   --   --   --  139*  --   --   --  77*  --  29   ALK PHOS U/L  --   --   --   --  104  --   --   --  77  --  114   ALBUMIN g/dL  --   --   --   --  1 6*  --   --   --  1 4*  --  2 9*   TOTAL BILIRUBIN mg/dL  --   --   --   --  0 73  --   --   --  0 50  --  0 70    < > = values in this interval not displayed       Results from last 7 days   Lab Units 01/17/21  0645 01/17/21  0507 01/16/21  1819 01/16/21  0743 01/15/21  2002 01/15/21  1723 01/15/21  0557   MAGNESIUM mg/dL 1 8 1 8 1 8 2 0 2 1 2 1 2 4   PHOSPHORUS mg/dL 1 9* 2 2* 2 3 2 0* 2 3 2 2* 1 3*      Results from last 7 days   Lab Units 01/17/21  2024 01/17/21  1418 01/17/21  0507 01/16/21  1730 01/16/21  1159 01/16/21  0437 01/15/21  1959  01/14/21  2356  01/12/21  1831 01/12/21  0327 01/11/21  1020   INR   --   --   --   --   --   --   --   --  1 44*  --  1 70* 1 52* 1 38* PTT seconds 76* 67* 57* 65* 82* 56* 118*   < > 37   < > 34 29 26    < > = values in this interval not displayed  Results from last 7 days   Lab Units 01/11/21  1020   TROPONIN I ng/mL <0 03     Results from last 7 days   Lab Units 01/13/21  2328 01/13/21  1205 01/12/21  1301 01/12/21  1008 01/12/21  0322 01/12/21  0206 01/11/21  2303   LACTIC ACID mmol/L 1 4 1 7 2 4* 2 8* 4 6* 3 3* 4 9*     ABG:  Results from last 7 days   Lab Units 01/17/21  1204   PH ART  7 507*   PCO2 ART mm Hg 29 5*   PO2 ART mm Hg 98 4   HCO3 ART mmol/L 22 9   BASE EXC ART mmol/L 0 2   ABG SOURCE  Line, Arterial     VBG:  Results from last 7 days   Lab Units 01/17/21  1204  01/12/21  1407   PH REAL   --   --  7 322   PCO2 REAL mm Hg  --   --  33 3*   PO2 REAL mm Hg  --   --  70 6*   HCO3 REAL mmol/L  --   --  16 8*   BASE EXC REAL mmol/L  --   --  -8 3   ABG SOURCE  Line, Arterial   < >  --     < > = values in this interval not displayed  Results from last 7 days   Lab Units 01/14/21  0408 01/13/21  0440 01/12/21  0638 01/11/21  1151   PROCALCITONIN ng/ml 1 13* 1 64* 0 65* 0 31*       Micro  Results from last 7 days   Lab Units 01/15/21  0621 01/15/21  0620 01/12/21  0640 01/12/21  0206 01/11/21  1937 01/11/21  1926 01/11/21  1020   BLOOD CULTURE  No Growth at 48 hrs  No Growth at 48 hrs   --   --   --  No Growth After 5 Days  No Growth After 5 Days  No Growth After 5 Days  No Growth After 5 Days  URINE CULTURE   --   --  No Growth <1000 cfu/mL  --   --   --   --    MRSA CULTURE ONLY   --   --   --   --  No Methicillin Resistant Staphlyococcus aureus (MRSA) isolated  --   --    LEGIONELLA URINARY ANTIGEN   --   --   --  Negative  --   --   --    STREP PNEUMONIAE ANTIGEN, URINE   --   --   --  Negative  --   --   --        EKG: EKGs yesterday with NSR and occasional PVC's  Imaging:   VAS upper limb venous duplex scan  Impression  RIGHT UPPER LIMB:  No evidence of acute or chronic deep vein thrombosis    No evidence of superficial thrombophlebitis noted  Doppler evaluation shows a normal response to augmentation maneuvers  LEFT UPPER LIMB:  No evidence of acute or chronic deep vein thrombosis  No evidence of superficial thrombophlebitis noted  Doppler evaluation shows a normal response to augmentation maneuvers  Intake and Output  I/O       01/16 0701 - 01/17 0700 01/17 0701 - 01/18 0700    I V  (mL/kg) 1837 7 (22 3) 2732 1 (33 2)    NG/ 580    IV Piggyback 300 200    Feedings 987 1497    Total Intake(mL/kg) 4044 7 (49 1) 5009 1 (60 8)    Urine (mL/kg/hr) 4270 (2 2) 3800 (1 9)    Total Output 4270 3800    Net -225 4 +1209 1              UOP: 158 ml/hr     Height and Weights   Height: 5' 6" (167 6 cm)  IBW: 63 8 kg  Body mass index is 29 32 kg/m²  Weight (last 2 days)     Date/Time   Weight    01/18/21 0555   82 4 (181 66)    01/17/21 0535   82 3 (181 44)    01/16/21 0600   83 1 (183 2)                Nutrition       Diet Orders   (From admission, onward)             Start     Ordered    01/18/21 0001  Diet NPO  Diet effective midnight     Question Answer Comment   Diet Type NPO    RD to adjust diet per protocol?  Yes        01/17/21 1323                Active Medications  Scheduled Meds:  Current Facility-Administered Medications   Medication Dose Route Frequency Provider Last Rate    acetaminophen  650 mg Oral Q6H PRN Tyesha Edmond MD      ascorbic acid  1,000 mg Oral Q12H Alex Romero MD      atorvastatin  40 mg Oral HS Pranav Wren MD      bisacodyl  10 mg Rectal Daily PRN Richie Prather MD      cefTRIAXone  1,000 mg Intravenous Q24H VERONICA Glover Stopped (01/17/21 1430)    chlorhexidine  15 mL Swish & Spit Q12H Alex Romero MD      cholecalciferol  2,000 Units Oral Daily Pranav Wren MD      dexamethasone  0 1 mg/kg Intravenous Q12H Pranav Wren MD      heparin (porcine)  3-30 Units/kg/hr (Order-Specific) Intravenous Titrated Pranav Wren MD 14 Units/kg/hr (01/18/21 1118)  heparin (porcine)  3,000 Units Intravenous Q1H PRN Enmanuel Mejia MD      heparin (porcine)  6,000 Units Intravenous Q1H PRN Enmanuel Mejia MD      HYDROmorphone  2 mg/hr Intravenous Continuous Shubham Mccann MD 2 mg/hr (01/18/21 6795)    HYDROmorphone  2 mg Intravenous Q2H PRN Shubham Mccann MD      insulin regular (HumuLIN R,NovoLIN R) infusion  0 3-21 Units/hr Intravenous Titrated Enmanuel Mejia MD 6 Units/hr (01/18/21 0556)    ketamine  1 mg/kg/hr Intravenous Continuous Eben Bustillo MD 1 mg/kg/hr (01/18/21 0546)    levalbuterol  1 25 mg Nebulization Q6H Enmanuel Mejia MD      midazolam  1 mg Intravenous Q1H PRN Enmanuel Mejia MD      zinc sulfate  220 mg Oral Daily Enmanuel Mejia MD      Followed by   Joaquim Galvan ON 1/19/2021] multivitamin-minerals  1 tablet Oral Daily Enmanuel Mejia MD      norepinephrine  1-30 mcg/min Intravenous Titrated Luis Charles PA-C 4 mcg/min (01/18/21 0329)    oxyCODONE  10 mg Oral Q6H PRN Shubham Mccann MD      pantoprazole  40 mg Intravenous Q12H [address] Shubham Mccann MD      polyethylene glycol  17 g Oral Daily Shubham Mccann MD      propofol  5-50 mcg/kg/min Intravenous Titrated Enmanuel Mejia MD 50 mcg/kg/min (01/18/21 6446)    senna  2 tablet Oral BID Shubham Mccann MD      sodium chloride  4 mL Nebulization Q6H Enmanuel Mejia MD       Continuous Infusions:  heparin (porcine), 3-30 Units/kg/hr (Order-Specific), Last Rate: 14 Units/kg/hr (01/18/21 0768)  HYDROmorphone, 2 mg/hr, Last Rate: 2 mg/hr (01/18/21 6583)  insulin regular (HumuLIN R,NovoLIN R) infusion, 0 3-21 Units/hr, Last Rate: 6 Units/hr (01/18/21 0156)  ketamine, 1 mg/kg/hr, Last Rate: 1 mg/kg/hr (01/18/21 9746)  norepinephrine, 1-30 mcg/min, Last Rate: 4 mcg/min (01/18/21 4110)  propofol, 5-50 mcg/kg/min, Last Rate: 50 mcg/kg/min (01/18/21 0546)      PRN Meds:   acetaminophen, 650 mg, Q6H PRN  bisacodyl, 10 mg, Daily PRN  heparin (porcine), 3,000 Units, Q1H PRN  heparin (porcine), 6,000 Units, Q1H PRN  HYDROmorphone, 2 mg, Q2H PRN  midazolam, 1 mg, Q1H PRN  oxyCODONE, 10 mg, Q6H PRN        Allergies   Allergies   Allergen Reactions    Varenicline      ---------------------------------------------------------------------------------------  Advance Directive and Living Will:      Power of :    POLST:    ---------------------------------------------------------------------------------------    Razia Barcenas MD      Portions of the record may have been created with voice recognition software  Occasional wrong word or "sound a like" substitutions may have occurred due to the inherent limitations of voice recognition software    Read the chart carefully and recognize, using context, where substitutions have occurred

## 2021-01-18 NOTE — UTILIZATION REVIEW
CLINICALS HAVE BEEN FAXED ON 01/12 @ 1605PM AND 01/14 @ 1638PM AND AGAIN ON 01/18 @ 206 PM     NO AUTH ON FILE AS OF YET IN PORTAL

## 2021-01-18 NOTE — UTILIZATION REVIEW
Continued Stay Review    Date:  1-18-21                    Current Patient Class: inpatient  Current Level of Care: critical care     HPI:60 y o  male initially admitted on 1-13-21 for covid 19 pneumonia  Treated for dka and intubated 1-12 1-14 patient developed a cold right lower extremity and underwent above the knee amputation     Assessment/Plan:     Patient remains intubated and sedate  Iv antibiotics, iv dexamethasone, iv protonix, heparin gtt, iv norepinephrine, iv insulin gtt         SURGERY DATE: 1/18/2021     Preop Diagnosis:  Limb ischemia [I99 8]     Post-Op Diagnosis Codes:     * Limb ischemia [I99 8]     Procedure(s) (LRB):  Completion right above knee amputation      Operative Indications:  Limb ischemia [I99 8]  COVID-19 pneumonia, presented with right leg acute limb ischemia  Underwent emergency thrombectomy  But continued to have progressive necrosis below the knee and myoglobinuria  Underwent emergency guillotine AKA on 1-14-21  Returns today for completion right AKA      Operative Findings:  Healthy bleeding at skin edges  Viable muscle      Complications:   None         Pertinent Labs/Diagnostic Results:       Results from last 7 days   Lab Units 01/18/21  0352 01/17/21  0755 01/16/21  1819 01/16/21  0743 01/16/21  0437 01/13/21  1611 01/13/21  0440 01/12/21  0618   WBC Thousand/uL 18 81* 10 55* 10 93* 10 66* 10 23* 7 65 6 71 11 76*   HEMOGLOBIN g/dL 8 3* 8 2* 7 9* 8 0* 8 1* 9 1* 9 7* 12 9   I STAT HEMOGLOBIN   --   --   --   --   --   --   --   --    HEMATOCRIT % 25 8* 24 2* 23 8* 24 1* 24 1* 26 5* 29 2* 37 5   HEMATOCRIT, ISTAT   --   --   --   --   --   --   --   --    PLATELETS Thousands/uL 155 101* 76* 60* 61* 34* 44* 108*   NEUTROS ABS Thousands/µL  --   --   --  8 96* 8 91*  --   --  10 78*   BANDS PCT % 2  --   --   --   --  14* 23*  --     < > = values in this interval not displayed           Results from last 7 days   Lab Units 01/18/21  0352 01/17/21  0645 01/17/21  0507 01/16/21  1819 01/16/21  0743  01/15/21  2002  01/15/21  0557  01/13/21 2202 01/13/21 2026 01/13/21 2021   SODIUM mmol/L 142 143 143 145 147*   < > 149*   < > 146*   < >  --   --   --    POTASSIUM mmol/L 4 7 4 1 4 2 3 7 4 1   < > 4 1   < > 3 9   < >  --   --   --    CHLORIDE mmol/L 111* 113* 113* 115* 119*   < > 120*   < > 118*   < >  --   --   --    CO2 mmol/L 25 24 26 24 23   < > 22   < > 23   < >  --   --   --    CO2, I-STAT mmol/L  --   --   --   --   --   --   --   --   --   --  23 22 25   ANION GAP mmol/L 6 6 4 6 5   < > 7   < > 5   < >  --   --   --    BUN mg/dL 18 20 21 22 21   < > 19   < > 19   < >  --   --   --    CREATININE mg/dL 0 74 0 80 0 74 0 78 0 65   < > 0 58*   < > 0 62   < >  --   --   --    EGFR ml/min/1 73sq m 99 96 99 97 104   < > 109   < > 106   < >  --   --   --    CALCIUM mg/dL 7 6* 7 5* 7 5* 7 3* 7 3*   < > 7 4*   < > 6 8*   < >  --   --   --    CALCIUM, IONIZED mmol/L  --   --   --   --   --   --  0 98*  --  0 91*  --   --   --   --    CALCIUM, IONIZED, ISTAT mmol/L  --   --   --   --   --   --   --   --   --   --  1 04* 1 11* 1 12   MAGNESIUM mg/dL 2 1 1 8 1 8 1 8 2 0  --  2 1   < > 2 4   < >  --   --   --    PHOSPHORUS mg/dL 3 3 1 9* 2 2* 2 3 2 0*  --  2 3   < > 1 3*   < >  --   --   --     < > = values in this interval not displayed       Results from last 7 days   Lab Units 01/18/21  0352 01/16/21  0437 01/13/21  0440 01/12/21  0835   AST U/L  --  139* 77*  --    ALT U/L  --  82* 33  --    ALK PHOS U/L  --  104 77  --    TOTAL PROTEIN g/dL  --  5 5* 5 2*  --    ALBUMIN g/dL  --  1 6* 1 4*  --    TOTAL BILIRUBIN mg/dL  --  0 73 0 50  --    AMMONIA umol/L 49*  --   --  36*     Results from last 7 days   Lab Units 01/18/21  0935 01/18/21  0751 01/18/21  0554 01/18/21  0349 01/18/21  0225 01/18/21  0023 01/17/21  2202 01/17/21  2010 01/17/21  1741 01/17/21  1555 01/17/21  1405 01/17/21  1154   POC GLUCOSE mg/dl 127 136 172* 221* 141* 124 181* 160* 132 142* 137 221*     Results from last 7 days   Lab Units 01/18/21  0352 01/17/21  0645 01/17/21  0507 01/16/21  1819 01/16/21  0743 01/16/21  0437 01/15/21  2002 01/15/21  1723 01/15/21  0557 01/14/21  2359 01/14/21  1745 01/14/21  1154   GLUCOSE RANDOM mg/dL 188* 133 159* 143* 159* 145* 166* 124 155* 183* 151* 142*             BETA-HYDROXYBUTYRATE   Date Value Ref Range Status   01/11/2021 6 2 (H) <0 6 mmol/L Final      Results from last 7 days   Lab Units 01/17/21  1204 01/16/21  1307 01/16/21  0958   PH ART  7 507* 7 511* 7 499*   PCO2 ART mm Hg 29 5* 26 7* 26 3*   PO2 ART mm Hg 98 4 69 6* 61 5*   HCO3 ART mmol/L 22 9 20 9* 20 0*   BASE EXC ART mmol/L 0 2 -1 5 -2 5   O2 CONTENT ART mL/dL 12 1* 11 7* 11 3*   O2 HGB, ARTERIAL % 96 6 94 1 92 5*   ABG SOURCE  Line, Arterial Line, Arterial Line, Arterial     Results from last 7 days   Lab Units 01/12/21  1407 01/11/21  1720   PH REAL  7 322 7 310   PCO2 REAL mm Hg 33 3* 23 2   PO2 REAL mm Hg 70 6* 95 0*   HCO3 REAL mmol/L 16 8* 11 4*   BASE EXC REAL mmol/L -8 3 -13 1   O2 CONTENT REAL ml/dL 14 3 19 9   O2 HGB, VENOUS % 90 4* 94 9     Results from last 7 days   Lab Units 01/13/21  2202 01/13/21 2026 01/13/21 2021 01/12/21  1116   PH, REAL I-STAT   --   --  7 279*  --    PCO2, REAL ISTAT mm HG  --   --  49 9  --    PO2, REAL ISTAT mm HG  --   --  39 0  --    HCO3, REAL ISTAT mmol/L  --   --  23 4*  --    I STAT BASE EXC mmol/L -5* -5* -3* -4*   I STAT O2 SAT % 98* 93* 66 88*   ISTAT PH ART  7 248* 7 280*  --  7 432   I STAT ART PCO2 mm HG 50 4* 44 6*  --  28 9*   I STAT ART PO2 mm  0* 75 0  --  51 0*   I STAT ART HCO3 mmol/L 22 0 20 9*  --  19 3*     Results from last 7 days   Lab Units 01/17/21  0507 01/16/21  1551 01/16/21  0437   CK TOTAL U/L 2,573* 3,550* 4,771*   CK MB INDEX % <1 0 <1 0 <1 0   CK MB ng/mL 7 2* 12 4* 20 3*         Results from last 7 days   Lab Units 01/14/21  0408 01/13/21  0440 01/11/21  1922   D-DIMER QUANTITATIVE ug/ml FEU 12 77* 18 65* 8 30*     Results from last 7 days   Lab Units 01/18/21  0352 01/17/21  2024 01/17/21  1418  01/14/21  2356  01/12/21  1831 01/12/21  0327   PROTIME seconds  --   --   --   --  17 5*  --  20 0* 18 4*   INR   --   --   --   --  1 44*  --  1 70* 1 52*   PTT seconds 107* 76* 67*   < > 37   < > 34 29    < > = values in this interval not displayed           Results from last 7 days   Lab Units 01/14/21  0408 01/13/21  0440 01/12/21  0638   PROCALCITONIN ng/ml 1 13* 1 64* 0 65*     Results from last 7 days   Lab Units 01/13/21  2328 01/13/21  1205 01/12/21  1301 01/12/21  1008 01/12/21  0322   LACTIC ACID mmol/L 1 4 1 7 2 4* 2 8* 4 6*             Results from last 7 days   Lab Units 01/11/21  1922   NT-PRO BNP pg/mL 448*     Results from last 7 days   Lab Units 01/14/21  0408 01/13/21  0440 01/12/21  0638   FERRITIN ng/mL 1,860* 1,608* 1,716*     Results from last 7 days   Lab Units 01/11/21  1922   HEP B S AG  Non-reactive   HEP C AB  High Reactive*   HEP B C IGM  Non-reactive   HEP B C TOTAL AB  Reactive*     Results from last 7 days   Lab Units 01/12/21  0638   LIPASE u/L 3,951*     Results from last 7 days   Lab Units 01/14/21  0408 01/13/21  0440 01/12/21  0638 01/11/21  1922   CRP mg/L 123 0* 279 4* 108 1* 80 0*         Results from last 7 days   Lab Units 01/14/21  0705 01/12/21  0640 01/12/21  0206   CLARITY UA  Turbid Cloudy Cloudy   COLOR UA  Nelsy Nelsy Red   SPEC GRAV UA  1 020 1 025 1 020   PH UA  5 5 6 0 6 0   GLUCOSE UA mg/dl 250 (1/4%)* Negative 250 (1/4%)*   KETONES UA mg/dl Trace* Negative 15 (1+)*   BLOOD UA  Large* Large* Large*   PROTEIN UA mg/dl 100 (2+)* 100 (2+)* 100 (2+)*   NITRITE UA  Negative Negative Negative   BILIRUBIN UA  Negative Negative Moderate*   UROBILINOGEN UA E U /dl 1 0 1 0 0 2   LEUKOCYTES UA  Small* Trace* Negative   WBC UA /hpf 2-4 4-10* 10-20*   RBC UA /hpf 10-20* 30-50* Innumerable*   BACTERIA UA /hpf None Seen Moderate* Innumerable*   EPITHELIAL CELLS WET PREP /hpf None Seen Occasional Moderate*     Results from last 7 days Lab Units 01/12/21  0206   STREP PNEUMONIAE ANTIGEN, URINE  Negative   LEGIONELLA URINARY ANTIGEN  Negative       Results from last 7 days   Lab Units 01/15/21  0621 01/15/21  0620 01/12/21  0640 01/11/21  1926   BLOOD CULTURE  No Growth at 72 hrs  No Growth at 72 hrs   --  No Growth After 5 Days  No Growth After 5 Days     URINE CULTURE   --   --  No Growth <1000 cfu/mL  --            Vital Signs:    01/18/21 1400  99 3 °F (37 4 °C)  96  28Abnormal   126/48  70 mmHg  97 %     01/18/21 1340            96 %     01/18/21 1300  98 2 °F (36 8 °C)  84  33Abnormal   108/40  60 mmHg  94 %     01/18/21 1247  98 2 °F (36 8 °C)  84  33Abnormal   106/40  58 mmHg  94 %     01/18/21 0923  98 6 °F (37 °C)  84  32Abnormal   142/60  86 mmHg  97 %     01/18/21 0900  98 6 °F (37 °C)  84  39Abnormal   148/62  90 mmHg  95 %     01/18/21 0821  98 2 °F (36 8 °C)  82  36Abnormal   158/64  94 mmHg  94 %     01/18/21 0800  98 6 °F (37 °C)  78  44Abnormal   158/64  94 mmHg  92 %     01/18/21 0716            95 %     01/18/21 0700  98 6 °F (37 °C)  82  26Abnormal   108/42  62 mmHg  97 %     01/18/21 0600  99 °F (37 2 °C)  78  22  148/52  82 mmHg  98 %     01/18/21 0500  99 7 °F (37 6 °C)  96  27Abnormal   94/40  56 mmHg  95 %     01/18/21 0412  100 4 °F (38 °C)  92  32Abnormal   138/50  76 mmHg  93 %     01/18/21 0400  100 4 °F (38 °C)  92  35Abnormal   144/52  78 mmHg  93 %  Ventilator   01/18/21 0312            94 %     01/18/21 0300  100 4 °F (38 °C)  86  34Abnormal   140/46  74 mmHg  94 %     01/18/21 0200  100 4 °F (38 °C)  88  35Abnormal   136/48  74 mmHg  93 %     01/18/21 0100  100 8 °F (38 2 °C)  Abnormal   90  36Abnormal   122/44  68 mmHg  92 %     01/18/21 0000  100 8 °F (38 2 °C)  Abnormal   94  36Abnormal   146/52  80 mmHg  94 %  Ventilator                   Medications:     ascorbic acid, 1,000 mg, Oral, Q12H LIZETT  atorvastatin, 40 mg, Oral, HS  cefTRIAXone, 1,000 mg, Intravenous, Q24H  chlorhexidine, 15 mL, Swish & Spit, Q12H [facility]  cholecalciferol, 2,000 Units, Oral, Daily  dexamethasone, 0 1 mg/kg, Intravenous, Q12H  doxycycline hyclate, 100 mg, Oral, Q12H LIZETT  gabapentin, 100 mg, Oral, TID  levalbuterol, 1 25 mg, Nebulization, Q6H  methadone, 10 mg, Intravenous, Q8H [facility]  [START ON 1/19/2021] multivitamin-minerals, 1 tablet, Oral, Daily  pantoprazole, 40 mg, Intravenous, Q12H LIZETT  polyethylene glycol, 17 g, Oral, Daily  senna, 2 tablet, Oral, BID  sodium chloride, 4 mL, Nebulization, Q6H      Continuous IV Infusions:  heparin (porcine), 3-30 Units/kg/hr (Order-Specific), Intravenous, Titrated  HYDROmorphone, 2 mg/hr, Intravenous, Continuous  insulin regular (HumuLIN R,NovoLIN R) infusion, 0 3-21 Units/hr, Intravenous, Titrated  ketamine, 1 mg/kg/hr, Intravenous, Continuous  norepinephrine, 1-30 mcg/min, Intravenous, Titrated  propofol, 5-50 mcg/kg/min, Intravenous, Titrated      PRN Meds:  acetaminophen, 650 mg, Oral, Q6H PRN  bisacodyl, 10 mg, Rectal, Daily PRN  heparin (porcine), 3,000 Units, Intravenous, Q1H PRN  heparin (porcine), 6,000 Units, Intravenous, Q1H PRN  HYDROmorphone, 2 mg, Intravenous, Q2H PRN  midazolam, 1 mg, Intravenous, Q1H PRN        Discharge Plan: to be determined     Network Utilization Review Department  ATTENTION: Please call with any questions or concerns to [phone] and carefully listen to the prompts so that you are directed to the right person  All voicemails are confidential   Forney Glass all requests for admission clinical reviews, approved or denied determinations and any other requests to dedicated fax number below belonging to the campus where the patient is receiving treatment   List of dedicated fax numbers for the Facilities:  [address] DENIALS (Administrative/Medical Necessity) [phone]   [address] (Maternity/NICU/Pediatrics) [phone]   5000 [facility] [place]  [phone]   Ganesh Ochoa Margaret [address] (Page Lawman) [address] [address] [address] [address]me[address] 1481 [phone]   [address] 951 [phone]

## 2021-01-18 NOTE — PROGRESS NOTES
Progress Note - Vascular Surgery   Debo Eddy 56 y o  male MRN: [id number]  [facility]: [id number]    Assessment:  61yo M COVID+ with [facility] class III ischemia of RLE and resultant rhabdomyolysis, s/p urgent R salvador AKA on 1/14    Plan:   Tentative plan for R AKA formalization on today 1/18/21 however, tube feeds were not held at midnight as planned and were instead held at 6:15 am today   Maintain R AKA stump dressing, do not remove   Rest of care per ICU    Subjective/Objective     Subjective:   N/A (intubated, sedated)    Objective:    Blood pressure 132/55, pulse 78, temperature 99 °F (37 2 °C), resp  rate 22, height 5' 6" (1 676 m), weight 82 4 kg (181 lb 10 5 oz), SpO2 98 %  ,Body mass index is 29 32 kg/m²  Intake/Output Summary (Last 24 hours) at 1/18/2021 6705  Last data filed at 1/18/2021 0600  Gross per 24 hour   Intake 5249 07 ml   Output 4000 ml   Net 1249 07 ml       Invasive Devices     Central Venous Catheter Line            CVC Central Lines 01/12/21 Triple Right 5 days          Peripheral Intravenous Line            Peripheral IV 01/15/21 Left Antecubital 2 days          Arterial Line            Arterial Line 01/12/21 Radial 5 days          Drain            Urethral Catheter 18 Fr  -- days    NG/OG/Enteral Tube Orogastric 16 Fr 6 days          Airway            ETT  Oral;Hi-Lo; Cuffed 7 5 mm 6 days                Physical Exam:   Gen:  ill-appearing  HENT: ETT/OGT in place  CV:  RRR  Lungs: mechanically ventilated on pressure support  Abd:  soft, NT/ND  : Veras in place   groin dressings C/D/I   Ext:  anasarca present   R AKA stump dressing in place  Skin: scattered ecchymosis  Neuro: sedated     Results from last 7 days   Lab Units 01/17/21  0755 01/16/21  1819 01/16/21  0743   WBC Thousand/uL 10 55* 10 93* 10 66*   HEMOGLOBIN g/dL 8 2* 7 9* 8 0*   HEMATOCRIT % 24 2* 23 8* 24 1*   PLATELETS Thousands/uL 101* 76* 60*     Results from last 7 days   Lab Units 01/17/21  0645 01/17/21  0507 01/16/21  1819  01/13/21  2202   POTASSIUM mmol/L 4 1 4 2 3 7   < >  --    CHLORIDE mmol/L 113* 113* 115*   < >  --    CO2 mmol/L 24 26 24   < >  --    CO2, I-STAT mmol/L  --   --   --   --  23   BUN mg/dL 20 21 22   < >  --    CREATININE mg/dL 0 80 0 74 0 78   < >  --    GLUCOSE, ISTAT mg/dl  --   --   --   --  179*   CALCIUM mg/dL 7 5* 7 5* 7 3*   < >  --     < > = values in this interval not displayed  Results from last 7 days   Lab Units 01/17/21 2024 01/17/21  1418 01/17/21  0507  01/14/21  2356  01/12/21  1831 01/12/21  0327   INR   --   --   --   --  1 44*  --  1 70* 1 52*   PTT seconds 76* 67* 57*   < > 37   < > 34 29    < > = values in this interval not displayed

## 2021-01-18 NOTE — DISCHARGE INSTR - OTHER ORDERS
R AKA:   · Cleanse wound daily w/ soap and water  Pat dry thoroughly  Betadine paint  · ABD pad  · ACE wrap to assist w/ edema control/ stump shrinkage      Skin care plans:   1-Apply TRIAD paste to sacro-buttocks daily  2-Elevate heel to offload pressure  3-Ehob cushion in chair when out of bed  4-Moisturize skin daily with skin nourishing cream    5-Turn/reposition q2h or when medically stable for pressure re-distribution on skin  6-Allevyn foam to heel, juan w/P, peel foam check skin integrity q-shift  Change q3d  7-Apply 3M skin protectant to right forearm scabs daily  8-Low air loss mattress    Patient should follow up in outpatient wound center     Call [phone] for scheduling

## 2021-01-18 NOTE — QUICK NOTE
Called and updated patient's nephew, Alejandrina Tanner  Discussed the term sepsis, which was mentioned to him previously and worried him, as well as the patient's current status and management plan, including status post-formalization, labs, antibiotics, sedation and pain management  All questions were answered  Alejandrina Tanner was very appreciative and thankful for the call

## 2021-01-18 NOTE — OP NOTE
OPERATIVE REPORT  PATIENT NAME: Dustin Bruce    :  1960  MRN: [id number]  Pt Location: [facility] OR ROOM 03    SURGERY DATE: 2021    Surgeon(s) and Role:     * Timoteo Morales MD - Primary     * Mark Rebolledo MD - Assisting    Preop Diagnosis:  Limb ischemia [I99 8]    Post-Op Diagnosis Codes:     * Limb ischemia [I99 8]    Procedure(s) (LRB):  Completion right above knee amputation    Specimen(s):  * No specimens in log *    Estimated Blood Loss:   150 mL    Drains:  NG/OG/Enteral Tube Orogastric 16 Fr (Active)   Placement Reverification Auscultation 21 0753   Site Assessment Clean;Dry; Intact 21 0753   Status Tube feed stopped or held 21 0753   Drainage Appearance Britta El; Thin 21   Intake (mL) 30 mL 21 0400   Output (mL) 0 mL 01/15/21 0800   Number of days: 6       Urethral Catheter 18 Fr  (Active)   Reasons to continue Urinary Catheter  Accurate I&O assessment in critically ill patients (48 hr  max) 21 0753   Goal for Removal Voiding trial when ambulation improves 21 1142   Site Assessment Clean;Skin intact; Patent 21 0753   Collection Container Standard drainage bag 21 0753   Securement Method Securing device (Describe) 21 0753   Output (mL) 350 mL 21 0753   Number of days:        Anesthesia Type:   General    Operative Indications:  Limb ischemia [I99 8]  COVID-19 pneumonia, presented with right leg acute limb ischemia  Underwent emergency thrombectomy  But continued to have progressive necrosis below the knee and myoglobinuria  Underwent emergency guillotine AKA on 21  Returns today for completion right AKA  Operative Findings:  Healthy bleeding at skin edges  Viable muscle  Complications:   None    Procedure and Technique:  The procedure was performed under  general anesthesia  The patient  was placed supine  The right lower extremity was prepped  and draped in the usual sterile fashion     The existing wound was irrigated and appeared to be healthy  Anterior and posterior skin flaps were outlined with a marking pen in fishmouth fashion  The skin incision was then performed and deepened through the subcutaneous tissue until the muscular fascia was identified  The greater saphenous vein was identified, ligated with 2-0 silk ties, and divided  The muscle groups over the anterior and medial thighs were divided with electrocautery at the same level of the skin incision  The neurovascular bundle was identified on the medial aspect of the thigh  The popliteal artery and veins were dissected further cephalad and suture  ligated with a 2-0 silk ligature  The sciatic nerve was pulled, ligated with a 2-0 silk tie, and divided  The posterior thigh muscles were then divided with electrocautery  Once the muscle groups  were circumferentially divided, the periosteum was incised and elevated approximately 5 cm proximally off the femur  The femur was divided with an electric saw  The proximal end of the transected femur was smoothed with a file  The amputation stump  was irrigated copiously with antibiotic solution  Hemostasis was secured  The periosteum was closed with a running 0 Vicryl suture over the transected femur  The fascia of the thigh muscles was closed with interrupted 0 Vicryl sutures  The skin was  approximated with interrupted 3-0 nylon sutures and skin staples and dressed with 4 × 4 gauze, Kerlix, and an Ace bandage and stockinet  The patient tolerated the procedure well and was taken to the postanesthesia care unit in stable condition       I was present for the entire procedure    Patient Disposition:  Critical Care Unit and intubated and critically ill    SIGNATURE: Dwight Estrada MD  DATE: January 18, 2021  TIME: 12:27 PM

## 2021-01-18 NOTE — PROGRESS NOTES
Progress Note - Acute Pain Service    Pauly Silva 56 y o  male MRN: [id number]  Unit/Bed#: MICU 11 Encounter: [id number]      Assessment:   Principal Problem:    Pneumonia due to COVID-19 virus  Active Problems:    Diabetic ketoacidosis without coma associated with type 2 diabetes mellitus ([facility] )    Acute metabolic encephalopathy    Ac[facility] kidney [facility] ([facility] )    Acute respiratory failure with hypoxia (HCC)    Sepsis due to COVID-19 ([facility] )    Hypernatremia    Metabolic acidosis, increased anion gap    Aortic thrombus ([facility] )    Pauly Silva is a 56 y o  male with past medical history significant for hypertension, hyperlipidemia, GERD, hepatitis-C, diabetes mellitus, opioid use disorder chronically on methadone 70 mg daily who initially presented to [facility] with nausea and vomiting was had complicated hospitalization including DKA which has been treated but remains on insulin infusion, COVID leading to acute hypoxic respiratory failure requiring intubation, encephalopathy, elevation in transaminases, acute kidney injury which has resolved, pulmonary embolism, hematemesis, hematuria status post cystoscopy and catheter placement  Patient also noted to have ischemic right lower extremity for which vascular surgery was consulted who started patient on heparin infusion it was transferred to [facility] is now status post AKA on 01/14/2020  Acute limb ischemia likely in the setting of aortic thrombus  Patient continues on heparin infusion  He is requiring pressors and significant levels of sedation including propofol, ketamine, midazolam   Analgesia with hydromorphone infusion  Home methadone on hold in lieu of hydromorphone infusion  Tentative plan for right AKA formalization today      Plan:   - sedation per primary team with propofol, midazolam and ketamine  - continue hydromorphone infusion at 2 milligrams/hour  - continue hydromorphone 2 mg IV q 2 hours p r n  but would change indication for breakthrough pain  - discontinue order for oxycodone  - patient is on dexamethasone per primary team  - bowel regimen with senna amrit, bisacodyl prn , polyethylene glycol amrit  - will discuss PNB with Anesthesiology (heparin infusion not held)  - possibly restart methadone 1/19     APS will continue to follow  Please call  / [phone] or [facility] Acute Pain Service - [facility] (/ between [phone] and on weekends) with questions or concerns    Pain History  Current pain location(s): Unable to ascertain - intubated and sedated  Pain Scale:   Unable to ascertain - intubated and sedated  Quality: Unable to ascertain - intubated and sedated  24 hour history:  Patient examined at doorway to limit COVID exposure  Per nursing, patient remain intubated and comfortable overnight  Patient with very minimal pressor requirements  Unable to obtain review of systems given sedation and intubation      Opioid requirement previous 24 hours:   Hydromorphone IV 54 mg    Meds/Allergies   all current active meds have been reviewed and current meds:   Current Facility-Administered Medications   Medication Dose Route Frequency    acetaminophen (TYLENOL) tablet 650 mg  650 mg Oral Q6H PRN    ascorbic acid (VITAMIN C) tablet 1,000 mg  1,000 mg Oral Q12H [address]    atorvastatin (LIPITOR) tablet 40 mg  40 mg Oral HS    bisacodyl (DULCOLAX) rectal suppository 10 mg  10 mg Rectal Daily PRN    cefTRIAXone (ROCEPHIN) 1,000 mg in dextrose 5 % 50 mL IVPB  1,000 mg Intravenous Q24H    chlorhexidine (PERIDEX) 0 12 % oral rinse 15 mL  15 mL Swish & Spit Q12H [address]    cholecalciferol (VITAMIN D3) tablet 2,000 Units  2,000 Units Oral Daily    dexamethasone (PF) (DECADRON) injection 7 3 mg  0 1 mg/kg Intravenous Q12H    heparin (porcine) 25,000 units in 0 45% NaCl 250 mL infusion (premix)  3-30 Units/kg/hr (Order-Specific) Intravenous Titrated    heparin (porcine) injection 3,000 Units  3,000 Units Intravenous Q1H PRN    heparin (porcine) injection 6,000 Units 6,000 Units Intravenous Q1H PRN    HYDROmorphone (DILAUDID) 50 mg in sodium chloride 0 9% 50mL drip  2 mg/hr Intravenous Continuous    HYDROmorphone (DILAUDID) injection 2 mg  2 mg Intravenous Q2H PRN    insulin regular (HumuLIN R,NovoLIN R) 1 Units/mL in sodium chloride 0 9 % 100 mL infusion  0 3-21 Units/hr Intravenous Titrated    ketamine 500 mg (DOUBLE CONCENTRATION) IV in sodium chloride 0 9% 250 mL  1 mg/kg/hr Intravenous Continuous    levalbuterol (XOPENEX) inhalation solution 1 25 mg  1 25 mg Nebulization Q6H    midazolam (VERSED) injection 1 mg  1 mg Intravenous Q1H PRN    [START ON 1/19/2021] multivitamin-minerals (CENTRUM ADULTS) tablet 1 tablet  1 tablet Oral Daily    norepinephrine (LEVOPHED) 4 mg (STANDARD CONCENTRATION) IV in sodium chloride 0 9% 250 mL  1-30 mcg/min Intravenous Titrated    oxyCODONE (ROXICODONE) immediate release tablet 10 mg  10 mg Oral Q6H PRN    pantoprazole (PROTONIX) injection 40 mg  40 mg Intravenous Q12H [facility]    polyethylene glycol (MIRALAX) packet 17 g  17 g Oral Daily    propofol (DIPRIVAN) 1000 mg in 100 mL infusion (premix)  5-50 mcg/kg/min Intravenous Titrated    senna (SENOKOT) tablet 17 2 mg  2 tablet Oral BID    sodium chloride 3 % inhalation solution 4 mL  4 mL Nebulization Q6H       Allergies   Allergen Reactions    Varenicline        Objective     Temp:  [98 2 °F (36 8 °C)-101 1 °F (38 4 °C)] 98 2 °F (36 8 °C)  HR:  [] 82  Resp:  [22-45] 36  Arterial Line BP: ()/(40-64) 158/64    Physical Exam  Constitutional:       General: He is not in acute distress  Comments: Physical exam performed at door  Calm, sedated   Cardiovascular:      Rate and Rhythm: Normal rate     Pulmonary:      Comments: Intubated  Genitourinary:     Comments: Eda        Lab Results:   Results from last 7 days   Lab Units 01/18/21  0352   WBC Thousand/uL 18 81*   HEMOGLOBIN g/dL 8 3*   HEMATOCRIT % 25 8*   PLATELETS Thousands/uL 155      Results from last 7 days Lab Units 01/18/21  0352  01/16/21  0437  01/13/21  2202   POTASSIUM mmol/L 4 7   < > 3 9   < >  --    CHLORIDE mmol/L 111*   < > 119*   < >  --    CO2 mmol/L 25   < > 23   < >  --    CO2, I-STAT mmol/L  --   --   --   --  23   BUN mg/dL 18   < > 20   < >  --    CREATININE mg/dL 0 74   < > 0 66   < >  --    CALCIUM mg/dL 7 6*   < > 7 1*   < >  --    ALK PHOS U/L  --   --  104  --   --    ALT U/L  --   --  82*  --   --    AST U/L  --   --  139*  --   --    GLUCOSE, ISTAT mg/dl  --   --   --   --  179*    < > = values in this interval not displayed  Imaging Studies: I have personally reviewed pertinent reports  EKG, Pathology, and Other Studies: I have personally reviewed pertinent reports  Please note that the APS provides consultative services regarding pain management only  With the exception of ketamine and epidural infusions and except when indicated, final decisions regarding starting or changing doses of analgesic medications are at the discretion of the consulting service  Off hours consultation and/or medication management is generally not available      Rajani Martinez MD  Acute Pain Service

## 2021-01-18 NOTE — PLAN OF CARE
Problem: Prexisting or High Potential for Compromised Skin Integrity  Goal: Skin integrity is maintained or improved  Description: INTERVENTIONS:  - Identify patients at risk for skin breakdown  - Assess and monitor skin integrity  - Assess and monitor nutrition and hydration status  - Monitor labs   - Assess for incontinence   - Turn and reposition patient  - Assist with mobility/ambulation  - Relieve pressure over bony prominences  - Avoid friction and shearing  - Provide appropriate hygiene as needed including keeping skin clean and dry  - Evaluate need for skin moisturizer/barrier cream  - Collaborate with interdisciplinary team   - Patient/family teaching  - Consider wound care consult   Outcome: Progressing     Problem: Potential for Falls  Goal: Patient will remain free of falls  Description: INTERVENTIONS:  - Assess patient frequently for physical needs  -  Identify cognitive and physical deficits and behaviors that affect risk of falls    -  [place] fall precautions as indicated by assessment   - Educate patient/family on patient safety including physical limitations  - Instruct patient to call for assistance with activity based on assessment  - Modify environment to reduce risk of injury  - Consider OT/PT consult to assist with strengthening/mobility  Outcome: Progressing

## 2021-01-18 NOTE — UTILIZATION REVIEW
Initial Clinical Review -  Patient information entered into [employer], attempted to submit clinical, received the following message: You are not authorized to access the requested content  Please use SHRUTI to request access  Clinical faxed  Admission: Date/Time/Statement:   Admission Orders (From admission, onward)     Ordered        01/11/21 1226  Inpatient Admission  Once                   Orders Placed This Encounter   Procedures    Inpatient Admission     Standing Status:   Standing     Number of Occurrences:   1     Order Specific Question:   Admitting Physician     Answer:   Luis Marin     Order Specific Question:   Level of Care     Answer:   Critical Care [15]     Order Specific Question:   Estimated length of stay     Answer:   More than 2 Midnights     Order Specific Question:   Certification     Answer:   I certify that inpatient services are medically necessary for this patient for a duration of greater than two midnights  See H&P and MD Progress Notes for additional information about the patient's course of treatment  ED Arrival Information     Expected Arrival Acuity Means of Arrival Escorted By Service Admission Type    - 1/11/2021 09:48 Emergent Ambulance [facility]FFILIATED WITH [facility]    Arrival Complaint    weakness        Chief Complaint   Patient presents with    Wheezing    Shortness of Breath    Weakness - Generalized           HPI:     56 y o  with PMH of  DM2 male who present to the ED from home with shortness of breath  Unable to get any history from patient as he is unable to speak due to what family states is a severe sore throat  Patient is following commands and does nod his head yes and no to questions  Patient was diagnosed with COVID on 12/31/2020 however was not requiring oxygen and was treating at home  Over the last week he has had nausea/vomiting and came to the ER and was discharged with prescription for Zofran    Over the last 2 days per family patient has had worsening sore throat and poor p o  Intake  In the ER today patient was found to be hypoxic and in DKA  CXR showed worsening ground glass opacities  Patient placed on oxygen and started on DKA protocol  Received bicarb in the ED due to significant metabolic acidosis  Physical exam: Awake and alert, not speaking  Follows commands  Decreased breath sounds B/L with tachypnea, trace B/L LE edema  Plan: Inpatient Critical Care admission for evaluation and treatment of Sepsis due to COVID 19, acute respiratory failure with hypoxia, acute metabolic encephalopathy, DKA and ANNALEE:  IV antibiotics, trend procalcitonin, check strep pneumo/urine Legionella  Continue IV dexamethasone  Continue oxygen, titrate as tolerated  DKA protocol, IV fluids, replace electrolytes, NPO      1/11 Internal Medicine: Patient with increasing oxygen requirement, currently on 100% FiO2, saturating 90%  Due to increasing oxygen requirements Critical Care recommended transferring for higher level of care  Continue DKA protocol, adjust fluids to 1/2 NSS  At this time patient is awake and following simple commands, moving all four extremities  1/11/21 Transferred from [place] [address] ICU to [facility] by helicopter transport  @ 1800         ED Triage Vitals   Temperature Pulse Respirations Blood Pressure SpO2   01/11/21 0958 01/11/21 0958 01/11/21 0958 01/11/21 0958 01/11/21 0958   (!) 96 8 °F (36 °C) (!) 109 (!) 28 143/81 (!) 84 % Room air   4          Wt Readings from Last 1 Encounters:   01/18/21 82 4 kg (181 lb 10 5 oz)     Additional Vital Signs:     Date/Time  Temp  Pulse  Resp  BP  MAP (mmHg)  SpO2  FiO2 (%)  O2 Flow Rate (L/min)  O2 Device  O2 Interface Device   01/11/21 1730    101  52Abnormal   150/92  115  94 %  100  40 L/min  High flow nasal cannula     01/11/21 1715    99  51Abnormal   149/88  112  94 %  100  40 L/min  High flow nasal cannula     01/11/21 1700  95 4 °F (35 2 °C)Abnormal 100  50Abnormal   148/89  114  95 %  100  40 L/min  High flow nasal cannula     Temp: on rema lowechastity at 01/11/21 1700   01/11/21 1645    100  49Abnormal   144/89  110  90 %  100  40 L/min  High flow nasal cannula     01/11/21 1644            84 %Abnormal   80    40 L/min  High flow nasal cannula   HFNC prongs   FiO2 (%): FiO2 increased to 100% @ this time  at 01/11/21 1644   O2 Device: Vapotherm at 01/11/21 1644   01/11/21 1630  95 8 °F (35 4 °C)Abnormal    111Abnormal   49Abnormal   152/88  115  83 %Abnormal   80  40 L/min  High flow nasal cannula     Temp: rema leong on at 01/11/21 1630   01/11/21 1615    120Abnormal   48Abnormal   137/96  112  90 %  80  40 L/min  High flow nasal cannula     01/11/21 1604            90 %  80  40 L/min  High flow nasal cannula  HFNC prongs    O2 Interface Device: Vapotherm at 01/11/21 1604   01/11/21 1600    101  50Abnormal   155/93  115  89 %Abnormal   80  40 L/min  High flow nasal cannula     01/11/21 1545  95 5 °F (35 3 °C)Abnormal    104  46Abnormal   145/88  112  90 %  80  40 L/min  High flow nasal cannula     Temp: rema leong ap-plied at 01/11/21 1545   01/11/21 1530    108Abnormal   51Abnormal   145/90  113  88 %Abnormal   80  40 L/min  High flow nasal cannula     01/11/21 1515    107Abnormal   50Abnormal   148/86  113  86 %Abnormal   80  40 L/min  High flow nasal cannula     01/11/21 1500    114Abnormal         88 %Abnormal     80  40 L/min  High flow nasal cannula   HFNC prongs    SpO2: Pt  keeps pulling HFNC out of his nares   at 01/11/21 1500   O2 Device: Vapotherm at 01/11/21 1500   O2 Interface Device: Vapotherm at 01/11/21 1500   01/11/21 1445        165/79  114             01/11/21 1430    110Abnormal   54Abnormal       92 %           01/11/21 1415    125Abnormal   49Abnormal       91 %           01/11/21 1400    104  50Abnormal   139/83  108  93 %           01/11/21 1330        154/87  114           01/11/21 1315    109Abnormal   49Abnormal       92 %           01/11/21 1307            89 %Abnormal    80  40 L/min  High flow nasal cannula   HFNC prongs   SpO2: 89-90% at 01/11/21 1307   O2 Device: Vapotherm at 01/11/21 1307   01/11/21 1301                        O2 Interface Device: Vapotherm at 01/11/21 1301   01/11/21 1300    109Abnormal   47Abnormal   140/76  103  83 %Abnormal            01/11/21 1230    114Abnormal   48Abnormal   137/76  100  82 %Abnormal            01/11/21 1200    112Abnormal   45Abnormal   121/71  92  89 %Abnormal            01/11/21 1130    109Abnormal   49Abnormal   140/87  105  85 %Abnormal            01/11/21 1100    110Abnormal   45Abnormal   144/76  104             01/11/21 1009                  Nasal cannula         Date and Time Eye Opening Best Verbal Response Best Motor Response Meena Coma Scale Score   01/11/21 1500 4 4 6 14         Pertinent Labs/Diagnostic Test Results:     1/11 CXR:  Worsening groundglass opacities of the lungs from Covid 19 pneumonia  1/11 EKG:      Sinus tachycardia  Low voltage QRS  Nonspecific ST-t wave changes  Abnormal ECG  When compared with ECG of 06-JAN-2021 05:37,  Vent   rate has increased BY  40 BPM          Results from last 7 days   Lab Units 01/18/21  0352 01/17/21  0755 01/16/21  1819 01/16/21  0743 01/16/21  0437  01/13/21  1611 01/13/21  0440  01/12/21  0618   WBC Thousand/uL 18 81* 10 55* 10 93* 10 66* 10 23*   < > 7 65 6 71   < > 11 76*   HEMOGLOBIN g/dL 8 3* 8 2* 7 9* 8 0* 8 1*   < > 9 1* 9 7*   < > 12 9   I STAT HEMOGLOBIN   --   --   --   --   --    < >  --   --    < >  --    HEMATOCRIT % 25 8* 24 2* 23 8* 24 1* 24 1*   < > 26 5* 29 2*   < > 37 5   HEMATOCRIT, ISTAT   --   --   --   --   --    < >  --   --    < >  --    PLATELETS Thousands/uL 155 101* 76* 60* 61*   < > 34* 44*   < > 108*   NEUTROS ABS Thousands/µL  --   --   --  8 96* 8 91*  --   --   --   --  10 78* BANDS PCT % 2  --   --   --   --   --  14* 23*  --   --     < > = values in this interval not displayed  Results from last 7 days   Lab Units 01/18/21  0352 01/17/21  0645 01/17/21  0507 01/16/21  1819 01/16/21  0743  01/15/21  2002  01/15/21  0557  01/13/21 2202 01/13/21 2026 01/13/21 2021   SODIUM mmol/L 142 143 143 145 147*   < > 149*   < > 146*   < >  --   --   --    POTASSIUM mmol/L 4 7 4 1 4 2 3 7 4 1   < > 4 1   < > 3 9   < >  --   --   --    CHLORIDE mmol/L 111* 113* 113* 115* 119*   < > 120*   < > 118*   < >  --   --   --    CO2 mmol/L 25 24 26 24 23   < > 22   < > 23   < >  --   --   --    CO2, I-STAT mmol/L  --   --   --   --   --   --   --   --   --   --  23 22 25   ANION GAP mmol/L 6 6 4 6 5   < > 7   < > 5   < >  --   --   --    BUN mg/dL 18 20 21 22 21   < > 19   < > 19   < >  --   --   --    CREATININE mg/dL 0 74 0 80 0 74 0 78 0 65   < > 0 58*   < > 0 62   < >  --   --   --    EGFR ml/min/1 73sq m 99 96 99 97 104   < > 109   < > 106   < >  --   --   --    CALCIUM mg/dL 7 6* 7 5* 7 5* 7 3* 7 3*   < > 7 4*   < > 6 8*   < >  --   --   --    CALCIUM, IONIZED mmol/L  --   --   --   --   --   --  0 98*  --  0 91*  --   --   --   --    CALCIUM, IONIZED, ISTAT mmol/L  --   --   --   --   --   --   --   --   --   --  1 04* 1 11* 1 12   MAGNESIUM mg/dL 2 1 1 8 1 8 1 8 2 0  --  2 1   < > 2 4   < >  --   --   --    PHOSPHORUS mg/dL 3 3 1 9* 2 2* 2 3 2 0*  --  2 3   < > 1 3*   < >  --   --   --     < > = values in this interval not displayed       Results from last 7 days   Lab Units 01/18/21  0352 01/16/21  0437 01/13/21  0440 01/12/21  0835 01/11/21  1422   AST U/L  --  139* 77*  --  29   ALT U/L  --  82* 33  --  25   ALK PHOS U/L  --  104 77  --  114   TOTAL PROTEIN g/dL  --  5 5* 5 2*  --  6 8   ALBUMIN g/dL  --  1 6* 1 4*  --  2 9*   TOTAL BILIRUBIN mg/dL  --  0 73 0 50  --  0 70   BILIRUBIN DIRECT mg/dL  --   --   --   --  0 40*   AMMONIA umol/L 49*  --   --  36*  --      Results from last 7 days   Lab Units 01/18/21  0935 01/18/21  0751 01/18/21  0554 01/18/21  0349 01/18/21  0225 01/18/21  0023 01/17/21  2202 01/17/21 2010 01/17/21  1741 01/17/21  1555 01/17/21  1405 01/17/21  1154   POC GLUCOSE mg/dl 127 136 172* 221* 141* 124 181* 160* 132 142* 137 221*     Results from last 7 days   Lab Units 01/18/21  0352 01/17/21  0645 01/17/21  0507 01/16/21  1819 01/16/21  0743 01/16/21  0437 01/15/21  2002 01/15/21  1723 01/15/21  0557 01/14/21  2359 01/14/21  1745 01/14/21  1154   GLUCOSE RANDOM mg/dL 188* 133 159* 143* 159* 145* 166* 124 155* 183* 151* 142*             BETA-HYDROXYBUTYRATE   Date Value Ref Range Status   01/11/2021 6 2 (H) <0 6 mmol/L Final      Results from last 7 days   Lab Units 01/17/21  1204 01/16/21  1307 01/16/21  0958   PH ART  7 507* 7 511* 7 499*   PCO2 ART mm Hg 29 5* 26 7* 26 3*   PO2 ART mm Hg 98 4 69 6* 61 5*   HCO3 ART mmol/L 22 9 20 9* 20 0*   BASE EXC ART mmol/L 0 2 -1 5 -2 5   O2 CONTENT ART mL/dL 12 1* 11 7* 11 3*   O2 HGB, ARTERIAL % 96 6 94 1 92 5*   ABG SOURCE  Line, Arterial Line, Arterial Line, Arterial     Results from last 7 days   Lab Units 01/12/21  1407 01/11/21  1720   PH REAL  7 322 7 310   PCO2 REAL mm Hg 33 3* 23 2   PO2 REAL mm Hg 70 6* 95 0*   HCO3 REAL mmol/L 16 8* 11 4*   BASE EXC REAL mmol/L -8 3 -13 1   O2 CONTENT REAL ml/dL 14 3 19 9   O2 HGB, VENOUS % 90 4* 94 9     Results from last 7 days   Lab Units 01/13/21 2202 01/13/21 2026 01/13/21  2021 01/12/21  1116   PH, REAL I-STAT   --   --  7 279*  --    PCO2, REAL ISTAT mm HG  --   --  49 9  --    PO2, REAL ISTAT mm HG  --   --  39 0  --    HCO3, REAL ISTAT mmol/L  --   --  23 4*  --    I STAT BASE EXC mmol/L -5* -5* -3* -4*   I STAT O2 SAT % 98* 93* 66 88*   ISTAT PH ART  7 248* 7 280*  --  7 432   I STAT ART PCO2 mm HG 50 4* 44 6*  --  28 9*   I STAT ART PO2 mm  0* 75 0  --  51 0*   I STAT ART HCO3 mmol/L 22 0 20 9*  --  19 3*     Results from last 7 days   Lab Units 01/17/21  0507 01/16/21  1559 01/16/21  0437   CK TOTAL U/L 2,573* 3,550* 4,771*   CK MB INDEX % <1 0 <1 0 <1 0   CK MB ng/mL 7 2* 12 4* 20 3*         Results from last 7 days   Lab Units 01/14/21  0408 01/13/21  0440 01/11/21  1922   D-DIMER QUANTITATIVE ug/ml FEU 12 77* 18 65* 8 30*     Results from last 7 days   Lab Units 01/18/21  0352 01/17/21  2024 01/17/21  1418  01/14/21  2356  01/12/21  1831 01/12/21  0327   PROTIME seconds  --   --   --   --  17 5*  --  20 0* 18 4*   INR   --   --   --   --  1 44*  --  1 70* 1 52*   PTT seconds 107* 76* 67*   < > 37   < > 34 29    < > = values in this interval not displayed           Results from last 7 days   Lab Units 01/14/21  0408 01/13/21  0440 01/12/21  0638   PROCALCITONIN ng/ml 1 13* 1 64* 0 65*     Results from last 7 days   Lab Units 01/13/21  2328 01/13/21  1205 01/12/21  1301 01/12/21  1008 01/12/21  0322 01/12/21  0206 01/11/21  2303   LACTIC ACID mmol/L 1 4 1 7 2 4* 2 8* 4 6* 3 3* 4 9*             Results from last 7 days   Lab Units 01/11/21  1922   NT-PRO BNP pg/mL 448*     Results from last 7 days   Lab Units 01/14/21  0408 01/13/21  0440 01/12/21  0638   FERRITIN ng/mL 1,860* 1,608* 1,716*     Results from last 7 days   Lab Units 01/11/21 1922   HEP B S AG  Non-reactive   HEP C AB  High Reactive*   HEP B C IGM  Non-reactive   HEP B C TOTAL AB  Reactive*     Results from last 7 days   Lab Units 01/12/21  0638 01/11/21  1422   LIPASE u/L 3,951* 539*     Results from last 7 days   Lab Units 01/14/21  0408 01/13/21  0440 01/12/21  0638 01/11/21  1922   CRP mg/L 123 0* 279 4* 108 1* 80 0*         Results from last 7 days   Lab Units 01/14/21  0705 01/12/21  0640 01/12/21  0206   CLARITY UA  Turbid Cloudy Cloudy   COLOR UA  Nelsy Nelsy Red   SPEC GRAV UA  1 020 1 025 1 020   PH UA  5 5 6 0 6 0   GLUCOSE UA mg/dl 250 (1/4%)* Negative 250 (1/4%)*   KETONES UA mg/dl Trace* Negative 15 (1+)*   BLOOD UA  Large* Large* Large*   PROTEIN UA mg/dl 100 (2+)* 100 (2+)* 100 (2+)*   NITRITE UA  Negative Negative Negative   BILIRUBIN UA  Negative Negative Moderate*   UROBILINOGEN UA E U /dl 1 0 1 0 0 2   LEUKOCYTES UA  Small* Trace* Negative   WBC UA /hpf 2-4 4-10* 10-20*   RBC UA /hpf 10-20* 30-50* Innumerable*   BACTERIA UA /hpf None Seen Moderate* Innumerable*   EPITHELIAL CELLS WET PREP /hpf None Seen Occasional Moderate*     Results from last 7 days   Lab Units 01/12/21  0206   STREP PNEUMONIAE ANTIGEN, URINE  Negative   LEGIONELLA URINARY ANTIGEN  Negative                             Results from last 7 days   Lab Units 01/15/21  0621 01/15/21  0620 01/12/21  0640 01/11/21  1926   BLOOD CULTURE  No Growth at 72 hrs  No Growth at 72 hrs   --  No Growth After 5 Days  No Growth After 5 Days     URINE CULTURE   --   --  No Growth <1000 cfu/mL  --      Results from last 7 days   Lab Units 01/14/21  2359 01/13/21  0440   TOTAL COUNTED  100 100           ED Treatment:   Medication Administration from 01/11/2021 0948 to 01/11/2021 1452       Date/Time Order Dose Route Action     01/11/2021 1432 sodium chloride 0 9 % bolus 1,000 mL 0 mL Intravenous Stopped     01/11/2021 1022 sodium chloride 0 9 % bolus 1,000 mL 1,000 mL Intravenous New Bag     01/11/2021 1036 methylPREDNISolone sodium succinate (Solu-MEDROL) injection 125 mg 125 mg Intravenous Given     01/11/2021 1037 albuterol inhalation solution 2 5 mg 2 5 mg Nebulization Given     01/11/2021 1150 insulin regular (HumuLIN R,NovoLIN R) 1 Units/mL in sodium chloride 0 9 % 100 mL infusion 1 Units/hr Intravenous New Bag     01/11/2021 1127 insulin regular (HumuLIN R,NovoLIN R) injection 20 Units 20 Units Intravenous Given     01/11/2021 1432 piperacillin-tazobactam (ZOSYN) 3 375 g in sodium chloride 0 9 % 100 mL IVPB 0 g Intravenous Stopped     01/11/2021 1150 piperacillin-tazobactam (ZOSYN) 3 375 g in sodium chloride 0 9 % 100 mL IVPB 3 375 g Intravenous New Bag     01/11/2021 1215 sodium bicarbonate 8 4 % injection 50 mEq 50 mEq Intravenous Given     01/11/2021 1348 potassium chloride (K-DUR,KLOR-CON) CR tablet 40 mEq 40 mEq Oral Given     01/11/2021 1339 sodium chloride 0 9 % bolus 1,000 mL 1,000 mL Intravenous New Bag     01/11/2021 1339 insulin regular (HumuLIN R,NovoLIN R) 1 Units/mL in sodium chloride 0 9 % 100 mL infusion 8 1 Units/hr Intravenous New Bag     01/11/2021 1314 lidocaine (URO-JET) 2 % urethral/mucosal gel 1 application 1 application Urethral Given        Past Medical History:   Diagnosis Date    Diabetes mellitus ([facility] [address] )     GERD (gastroesophageal reflux disease)     Hypercholesteremia     Hypertension     [facility]      Present on Admission:  **None**      Admitting Diagnosis: Acidosis [E87 2]  Shortness of breath [R06 02]  Wheezing [R06 2]  Urinary retention [R33 9]  DKA (diabetic ketoacidoses) (HCC) [E11 10]  Leukocytosis [D72 829]  Tachycardia [R00 0]  Hypoxia [R09 02]  Acute respiratory failure with hypoxia (HCC) [J96 01]  Generalized weakness [R53 1]  Age/Sex: 56 y o  male           Admission Orders:        dexamethasone (DECADRON) injection 6 mg   Dose: 6 mg  Freq: Daily Route: IV  Start: 01/12/21 0900 End: 01/11/21 1836      cholecalciferol (VITAMIN D3) tablet 2,000 Units   Dose: 2,000 Units  Freq: Daily Route: PO  Start: 01/12/21 0900    atorvastatin (LIPITOR) tablet 40 mg   Dose: 40 mg  Freq: Daily at bedtime Route: PO  Start: 01/11/21 2200    ascorbic acid (VITAMIN C) tablet 1,000 mg   Dose: 1,000 mg  Freq: Every 12 hours scheduled Route: PO  Start: 01/11/21 2100 End: 01/18/21 2059    potassium chloride 20 mEq IVPB (premix)   Dose: 20 mEq  Freq: Every 1 hour scheduled Route: IV  Start: 01/11/21 1600 End: 01/11/21 1836    cefepime (MAXIPIME) 2,000 mg in dextrose 5 % 50 mL IVPB   Dose: 2,000 mg  Freq: Every 12 hours Route: IV  Start: 01/12/21 0145 End: 01/11/21 1915    cefTRIAXone (ROCEPHIN) 1,000 mg in dextrose 5 % 50 mL IVPB   Dose: 1,000 mg  Freq: Every 24 hours Route: IV  Last Dose: 1,000 mg (01/12/21 0948)  Start: 01/11/21 0900    Continuous infusion:        insulin regular (HumuLIN R,NovoLIN R) 1 Units/mL in sodium chloride 0 9 % 100 mL infusion   Rate: 0 1-30 mL/hr Dose: 0 1-30 Units/hr  Freq: Continuous Route: IV  Last Dose: 16 2 Units/hr (01/11/21 1607)  Start: 01/11/21 1300 End: 01/11/21 1836      sodium chloride infusion 0 45 %   Rate: 250 mL/hr Dose: 250 mL/hr  Freq: Continuous Route: IV  Last Dose: 250 mL/hr (01/11/21 1611)  Start: 01/11/21 1600 End: 01/11/21 1836          Network Utilization Review Department  ATTENTION: Please call with any questions or concerns to [phone] and carefully listen to the prompts so that you are directed to the right person  All voicemails are confidential   Fredy Michael all requests for admission clinical reviews, approved or denied determinations and any other requests to dedicated fax number below belonging to the campus where the patient is receiving treatment   List of dedicated fax numbers for the Facilities:  [address] DENIALS (Administrative/Medical Necessity) [phone]   [address] (Maternity/NICU/Pediatrics) [phone]   [address] [facility] [address] Malia Robles [address] ([address] "[address]) [address] [place] [address] [phone] [place] [address] [phone]

## 2021-01-18 NOTE — ANESTHESIA POSTPROCEDURE EVALUATION
Post-Op Assessment Note    CV Status:  Stable  Pain Score: 0    Pain management: adequate  Multimodal analgesia used between 6 hours prior to anesthesia start to PACU discharge    Mental Status:  Somnolent   Hydration Status:  Stable   PONV Controlled:  None  Airway: intubated      Post Op Vitals Reviewed: Yes      Staff: Anesthesiologist, CRNA   Comments: pt transported to MICU intubated and sedated        No complications documented      BP   114/56   Temp      Pulse  85   Resp  28   SpO2   96

## 2021-01-18 NOTE — OCCUPATIONAL THERAPY NOTE
OT CANCEL NOTE    OT orders received  Chart reviewed  Pt is currently intubated/sedated and not appropriate to engage in skilled OT services at this time  Will hold initial OT evaluation  Will continue to follow pt on caseload and see pt when medically stable and as clinically appropriate           01/18/21 7952   Note Type   Cancel Reasons Intubated/sedated     Brittani Aguilar MS, OTR/L

## 2021-01-19 PROBLEM — I99.8 LIMB ISCHEMIA: Status: ACTIVE | Noted: 2021-01-19

## 2021-01-19 LAB
ABO GROUP BLD BPU: NORMAL
ANION GAP SERPL CALCULATED.3IONS-SCNC: 3 MMOL/L (ref 4–13)
APTT PPP: 70 SECONDS (ref 23–37)
BPU ID: NORMAL
BUN SERPL-MCNC: 25 MG/DL (ref 5–25)
CA-I BLD-SCNC: 1.07 MMOL/L (ref 1.12–1.32)
CALCIUM SERPL-MCNC: 7.7 MG/DL (ref 8.3–10.1)
CHLORIDE SERPL-SCNC: 109 MMOL/L (ref 100–108)
CO2 SERPL-SCNC: 26 MMOL/L (ref 21–32)
CREAT SERPL-MCNC: 0.72 MG/DL (ref 0.6–1.3)
CROSSMATCH: NORMAL
D DIMER PPP FEU-MCNC: 2.54 UG/ML FEU
ERYTHROCYTE [DISTWIDTH] IN BLOOD BY AUTOMATED COUNT: 15 % (ref 11.6–15.1)
GFR SERPL CREATININE-BSD FRML MDRD: 100 ML/MIN/1.73SQ M
GLUCOSE SERPL-MCNC: 110 MG/DL (ref 65–140)
GLUCOSE SERPL-MCNC: 114 MG/DL (ref 65–140)
GLUCOSE SERPL-MCNC: 131 MG/DL (ref 65–140)
GLUCOSE SERPL-MCNC: 148 MG/DL (ref 65–140)
GLUCOSE SERPL-MCNC: 160 MG/DL (ref 65–140)
GLUCOSE SERPL-MCNC: 166 MG/DL (ref 65–140)
GLUCOSE SERPL-MCNC: 170 MG/DL (ref 65–140)
GLUCOSE SERPL-MCNC: 176 MG/DL (ref 65–140)
GLUCOSE SERPL-MCNC: 189 MG/DL (ref 65–140)
GLUCOSE SERPL-MCNC: 196 MG/DL (ref 65–140)
GLUCOSE SERPL-MCNC: 199 MG/DL (ref 65–140)
GLUCOSE SERPL-MCNC: 199 MG/DL (ref 65–140)
GLUCOSE SERPL-MCNC: 212 MG/DL (ref 65–140)
HCT VFR BLD AUTO: 23.9 % (ref 36.5–49.3)
HGB BLD-MCNC: 8.1 G/DL (ref 12–17)
MAGNESIUM SERPL-MCNC: 2 MG/DL (ref 1.6–2.6)
MCH RBC QN AUTO: 30.3 PG (ref 26.8–34.3)
MCHC RBC AUTO-ENTMCNC: 33.9 G/DL (ref 31.4–37.4)
MCV RBC AUTO: 90 FL (ref 82–98)
PHOSPHATE SERPL-MCNC: 3.4 MG/DL (ref 2.3–4.1)
PLATELET # BLD AUTO: 167 THOUSANDS/UL (ref 149–390)
PMV BLD AUTO: 12.1 FL (ref 8.9–12.7)
POTASSIUM SERPL-SCNC: 4.5 MMOL/L (ref 3.5–5.3)
PROCALCITONIN SERPL-MCNC: 0.96 NG/ML
RBC # BLD AUTO: 2.67 MILLION/UL (ref 3.88–5.62)
SODIUM SERPL-SCNC: 138 MMOL/L (ref 136–145)
UNIT DISPENSE STATUS: NORMAL
UNIT PRODUCT CODE: NORMAL
UNIT RH: NORMAL
WBC # BLD AUTO: 19.2 THOUSAND/UL (ref 4.31–10.16)

## 2021-01-19 PROCEDURE — 84100 ASSAY OF PHOSPHORUS: CPT | Performed by: PHYSICIAN ASSISTANT

## 2021-01-19 PROCEDURE — 82948 REAGENT STRIP/BLOOD GLUCOSE: CPT

## 2021-01-19 PROCEDURE — 94760 N-INVAS EAR/PLS OXIMETRY 1: CPT

## 2021-01-19 PROCEDURE — 85730 THROMBOPLASTIN TIME PARTIAL: CPT | Performed by: INTERNAL MEDICINE

## 2021-01-19 PROCEDURE — 94003 VENT MGMT INPAT SUBQ DAY: CPT

## 2021-01-19 PROCEDURE — 80048 BASIC METABOLIC PNL TOTAL CA: CPT | Performed by: PHYSICIAN ASSISTANT

## 2021-01-19 PROCEDURE — 99291 CRITICAL CARE FIRST HOUR: CPT | Performed by: INTERNAL MEDICINE

## 2021-01-19 PROCEDURE — C9113 INJ PANTOPRAZOLE SODIUM, VIA: HCPCS | Performed by: SURGERY

## 2021-01-19 PROCEDURE — 82330 ASSAY OF CALCIUM: CPT | Performed by: PHYSICIAN ASSISTANT

## 2021-01-19 PROCEDURE — 84145 PROCALCITONIN (PCT): CPT | Performed by: INTERNAL MEDICINE

## 2021-01-19 PROCEDURE — 83735 ASSAY OF MAGNESIUM: CPT | Performed by: PHYSICIAN ASSISTANT

## 2021-01-19 PROCEDURE — 94640 AIRWAY INHALATION TREATMENT: CPT

## 2021-01-19 PROCEDURE — 85379 FIBRIN DEGRADATION QUANT: CPT | Performed by: PHYSICIAN ASSISTANT

## 2021-01-19 PROCEDURE — 85027 COMPLETE CBC AUTOMATED: CPT | Performed by: PHYSICIAN ASSISTANT

## 2021-01-19 RX ORDER — HYDROMORPHONE HCL/PF 1 MG/ML
1 SYRINGE (ML) INJECTION
Status: DISCONTINUED | OUTPATIENT
Start: 2021-01-19 | End: 2021-01-25

## 2021-01-19 RX ADMIN — PANTOPRAZOLE SODIUM 40 MG: 40 INJECTION, POWDER, FOR SOLUTION INTRAVENOUS at 08:40

## 2021-01-19 RX ADMIN — METHADONE HYDROCHLORIDE 10 MG: 10 INJECTION, SOLUTION INTRAMUSCULAR; INTRAVENOUS; SUBCUTANEOUS at 21:55

## 2021-01-19 RX ADMIN — MIDAZOLAM 1 MG: 1 INJECTION INTRAMUSCULAR; INTRAVENOUS at 21:56

## 2021-01-19 RX ADMIN — PROPOFOL 35.06 MCG/KG/MIN: 10 INJECTION, EMULSION INTRAVENOUS at 17:01

## 2021-01-19 RX ADMIN — PROPOFOL 35 MCG/KG/MIN: 10 INJECTION, EMULSION INTRAVENOUS at 22:42

## 2021-01-19 RX ADMIN — METHADONE HYDROCHLORIDE 10 MG: 10 INJECTION, SOLUTION INTRAMUSCULAR; INTRAVENOUS; SUBCUTANEOUS at 14:48

## 2021-01-19 RX ADMIN — DOXYCYCLINE 100 MG: 100 CAPSULE ORAL at 21:56

## 2021-01-19 RX ADMIN — KETAMINE HYDROCHLORIDE 0.5 MG/KG/HR: 50 INJECTION INTRAMUSCULAR; INTRAVENOUS at 12:25

## 2021-01-19 RX ADMIN — Medication 2000 UNITS: at 08:39

## 2021-01-19 RX ADMIN — CHLORHEXIDINE GLUCONATE 0.12% ORAL RINSE 15 ML: 1.2 LIQUID ORAL at 08:40

## 2021-01-19 RX ADMIN — DOXYCYCLINE 100 MG: 100 CAPSULE ORAL at 08:39

## 2021-01-19 RX ADMIN — METHADONE HYDROCHLORIDE 10 MG: 10 INJECTION, SOLUTION INTRAMUSCULAR; INTRAVENOUS; SUBCUTANEOUS at 05:51

## 2021-01-19 RX ADMIN — MIDAZOLAM 1 MG: 1 INJECTION INTRAMUSCULAR; INTRAVENOUS at 18:32

## 2021-01-19 RX ADMIN — ATORVASTATIN CALCIUM 40 MG: 40 TABLET, FILM COATED ORAL at 21:56

## 2021-01-19 RX ADMIN — LEVALBUTEROL HYDROCHLORIDE 1.25 MG: 1.25 SOLUTION, CONCENTRATE RESPIRATORY (INHALATION) at 07:32

## 2021-01-19 RX ADMIN — SODIUM CHLORIDE 9 UNITS/HR: 9 INJECTION, SOLUTION INTRAVENOUS at 09:53

## 2021-01-19 RX ADMIN — GABAPENTIN 100 MG: 100 CAPSULE ORAL at 08:39

## 2021-01-19 RX ADMIN — PROPOFOL 50 MCG/KG/MIN: 10 INJECTION, EMULSION INTRAVENOUS at 05:52

## 2021-01-19 RX ADMIN — Medication 1 TABLET: at 08:39

## 2021-01-19 RX ADMIN — KETAMINE HYDROCHLORIDE 1 MG/KG/HR: 50 INJECTION INTRAMUSCULAR; INTRAVENOUS at 05:52

## 2021-01-19 RX ADMIN — PROPOFOL 45 MCG/KG/MIN: 10 INJECTION, EMULSION INTRAVENOUS at 08:34

## 2021-01-19 RX ADMIN — CHLORHEXIDINE GLUCONATE 0.12% ORAL RINSE 15 ML: 1.2 LIQUID ORAL at 21:55

## 2021-01-19 RX ADMIN — SODIUM CHLORIDE SOLN NEBU 3% 4 ML: 3 NEBU SOLN at 00:18

## 2021-01-19 RX ADMIN — LEVALBUTEROL HYDROCHLORIDE 1.25 MG: 1.25 SOLUTION, CONCENTRATE RESPIRATORY (INHALATION) at 13:20

## 2021-01-19 RX ADMIN — LEVALBUTEROL HYDROCHLORIDE 1.25 MG: 1.25 SOLUTION, CONCENTRATE RESPIRATORY (INHALATION) at 19:18

## 2021-01-19 RX ADMIN — GABAPENTIN 100 MG: 100 CAPSULE ORAL at 21:56

## 2021-01-19 RX ADMIN — HEPARIN SODIUM 12 UNITS/KG/HR: 10000 INJECTION, SOLUTION INTRAVENOUS at 07:01

## 2021-01-19 RX ADMIN — PROPOFOL 45 MCG/KG/MIN: 10 INJECTION, EMULSION INTRAVENOUS at 12:37

## 2021-01-19 RX ADMIN — SODIUM CHLORIDE SOLN NEBU 3% 4 ML: 3 NEBU SOLN at 19:18

## 2021-01-19 RX ADMIN — CEFTRIAXONE 1000 MG: 1 INJECTION, POWDER, FOR SOLUTION INTRAMUSCULAR; INTRAVENOUS at 14:52

## 2021-01-19 RX ADMIN — GABAPENTIN 100 MG: 100 CAPSULE ORAL at 16:19

## 2021-01-19 RX ADMIN — DEXAMETHASONE SODIUM PHOSPHATE 7.3 MG: 10 INJECTION, SOLUTION INTRAMUSCULAR; INTRAVENOUS at 08:40

## 2021-01-19 RX ADMIN — LEVALBUTEROL HYDROCHLORIDE 1.25 MG: 1.25 SOLUTION, CONCENTRATE RESPIRATORY (INHALATION) at 00:19

## 2021-01-19 RX ADMIN — SENNOSIDES 17.2 MG: 8.6 TABLET, FILM COATED ORAL at 17:59

## 2021-01-19 RX ADMIN — PANTOPRAZOLE SODIUM 40 MG: 40 INJECTION, POWDER, FOR SOLUTION INTRAVENOUS at 21:55

## 2021-01-19 RX ADMIN — SODIUM CHLORIDE SOLN NEBU 3% 4 ML: 3 NEBU SOLN at 07:32

## 2021-01-19 RX ADMIN — DEXAMETHASONE SODIUM PHOSPHATE 7.3 MG: 10 INJECTION, SOLUTION INTRAMUSCULAR; INTRAVENOUS at 21:55

## 2021-01-19 RX ADMIN — SODIUM CHLORIDE SOLN NEBU 3% 4 ML: 3 NEBU SOLN at 13:20

## 2021-01-19 RX ADMIN — Medication 2 MG/HR: at 11:24

## 2021-01-19 RX ADMIN — SENNOSIDES 17.2 MG: 8.6 TABLET, FILM COATED ORAL at 08:39

## 2021-01-19 NOTE — PROGRESS NOTES
Daily Progress Note - Critical Care   Gavin Russell 56 y o  male MRN: [id number]  Unit/Bed#: MICU 6 Encounter: [id number]        ----------------------------------------------------------------------------------------  HPI/24hr events:   56year old man with Hep C, DM2, HTN, HLP, and chronic methadone use  Found to be COVID-19 (+) 12/31  Presented for recurrent N/V at [facility]   Found to have DKA, transferred to [facility] on 1/11, treated DKA, treated for COVID on severe pathway  Had encephalopathy and increased WOB, intubated on 1/12   Also found to have hematemesis/coffee grounds prior to intubation and placed on PPI, EGD not performed  Also with hematuria s/p cystoscopy and catheter placed  Cold RLE noted, vascular surgery consulted and started on UFH gtt and transferred to [facility] on 1/13 for further vascular surgery evaluation  Found to have acute limb ischemia secondary to emboli from aortic thrombus s/p AKA on 1/14  Yesterday, pt underwent formalization of RLE AKA without complication  Hgb after in the evening was 6 8  Pt transfused 1 unit PRBC with Hgb responsive to 7 7  Overnight, pt remained intubated on ASV  Attempt was made to wean propofol but pt did not tolerate well  No other weans were attempted  Pt had two BM's overnight  No other acute events  ---------------------------------------------------------------------------------------  SUBJECTIVE  This am, pt is sedated and not arousable       Review of systems was unable to be performed secondary to intubated and sedated  ---------------------------------------------------------------------------------------  Assessment and Plan:    Neuro:    Diagnosis: Toxic/metabolic encephalopathy, sedation and pain   o Propofol 50, ketamine 1, dilaudid 2, PRN versed and dilaudid   o Did not require any PRN in last 24 hours   o Attempt to wean sedation as tolerated, would prefer decreasing propofol given BP but this has not been tolerated, can try to decrease ketamine   o Pt started on IV methadone yesterday per acute pain management recommendations and pharmacy input on dosing   o Goal RASS -2 to 0  o CAM-ICU      CV:    Diagnosis: Aortic thrombus, history of HTN, intermittent hypotension   o Goal MAP > 65, BP range 112-138/46-50  o Levophed gtt PRN, on levo 2 currently   o Holding home BP meds  o Monitor hemodynamics, monitor tele  o Continue heparin drip  o Vascular surgery following       Pulm:   Diagnosis: Acute hypoxic respiratory failure 2/2 COVID19   o Current vent settings ASV 80%, PEEP 6, FiO2 60%  o Consider weaning FiO2 or returning to PSV   o Intubated on 1/12 due to increased WOB and hypoxia  o Continue pulm hygiene and suctioning  o Trend ABG    Diagnosis: Pulmonary embolus   o CTA 1/12 showing single small nonocclusive left lower PE  o On heparin gtt       GI:    Diagnosis: GERD, hepatitis C, previous signs of Upper GI bleed   o No current evidence of active bleeding  o Continue monitoring OGT or signs and symptoms of GIB, contact GI and EGD as necessary  o Continue protonix BID   o Trend CBC daily   Tube feeds and bowel regimen   o Senokot and miralax daily, dulcolax suppository PRN   o Last bowel movement- 2 overnight   o Tube feeds: Glucerna 1 2 Henry @ 56 mL/hour, at goal       :    Diagnosis: Gross hematuria, ANNALEE- resolved, UTI, likely myoglobinuria   o No blood in urine currently  o On urine microscopy 1/12 moderate bacteria, cx negative,   UA on 1/14 show blood, no bacteria, neg nitrite, small leukocytes  o Received antibiotics- now on ceftriaxone 1g q24h   o Per urology, maintain rogers catheter for at least 7 days (1/19) - plan to remove today   o Trend BMP, Cr, strict I&O's  o UOP at goal above 100 mL/ hour       F/E/N:    Fluids- off fluids and diuresis   Electrolytes: trend and replete as needed   Nutrition: Continue tube feeds Glucerna 1 2 Henry @ 56 mL/hour, at goal       Heme/Onc:    Diagnosis: Anemia   o Likely acute blood loss anemia following procedure yesterday, responsive to transfusion   o No evidence of active bleeding including from OGT   o Hgb 6 8 yesterday improved to 7 7 s/p 1U PRBC   o Trend daily CBC- Hgb 8 1 from 7 7    Diagnosis: Thrombocytopenia- improved  o Predated initiation of heparin  o Trend daily CBC- Plt 167 from 155 yesterday       Endo:    Diagnosis: T2DM, resolved DKA   o Insulin gtt  o Sugars controlled      ID:     Diagnosis: COVID19 infection   o Plan: severe COVID pathway  o Decadron 0 1 mg/kg day 8/10   o Continue zinc, vitamin D, vitamin C, statin  o Remdesivr not given  o Actemra not given  o Convalescent plasma not given  o Ceftriaxone 1g daily day 3/7 and doxycycline 100 mg q12h day 2/6 (end 1/23)     Diagnosis: Leukocytosis   o Trend CBC- WBC 19 20 from 18 8 yesterday, neutrophilic   o Afebrile since 1/18, continue to monitor temps  o Procal 0 96  o Blood cx 1/11 and 1/15 no growth   o CXR on 1/15 shows stable b/l opacities without new focal consolidation  o Consider CXR       MSK/Skin:    Diagnosis: Acute limb ischemia, rhebdomyolysis   ?  Duplex lower extremities on 1/12 with R femoral DVT, post intubation RLE found to be pale and pulseless, transferred here for vascular, CT with filling defect in descending aorta consistent with thrombus    o POD#5 s/p RLE AKA guillotine amputation, POD#1 from formalization of AKA   o Continue to turn and reposition frequently  o Pressure off-loading  o PT/OT, PM&R consulted   o Monitor UOP and Cr, CK trended down (15,810 on 1/14 to 2,573 on 1/17)  o Pain management  - Acute pain management on board given chronic methadone use- started IV methadone yesterday and as pt tolerated could potentially wean ketamine  - Gabapentin 100mg PO TID crushed through OGT (home med)  - Dilaudid PRN for breakthrough pain   - Discussing with anesthesiology utility of regional nerve block, could not given because on heparin gtt - hold on this for now and see how pt tolerates above interventions first       Diagnosis: Blistering of RUE   o Possibly IV related, does not seem infectious   o Continue monitor for signs and symptoms of infection  o Ultrasound of upper extremities negative for DVT or superficial thrombophlebitis  o Vascular aware of this finding       Disposition: Continue Critical Care   Code Status: Level 1 - Full Code  ---------------------------------------------------------------------------------------  ICU CORE MEASURES    Prophylaxis   VTE Pharmacologic Prophylaxis: Heparin Drip  VTE Mechanical Prophylaxis: sequential compression device  Stress Ulcer Prophylaxis: Pantoprazole IV     ABCDE Protocol (if indicated)  Plan to perform spontaneous awakening trial today? No  Plan to perform spontaneous breathing trial today? No  Obvious barriers to extubation? Yes  CAM-ICU: Negative    Invasive Devices Review  Invasive Devices     Central Venous Catheter Line            CVC Central Lines 21 Triple Right 6 days          Peripheral Intravenous Line            Peripheral IV 21 Right;Upper Arm less than 1 day    Peripheral IV 21 Left;Proximal;Ventral (anterior) Forearm less than 1 day          Arterial Line            Arterial Line 21 Radial 6 days          Drain            Urethral Catheter 18 Fr  -- days    NG/OG/Enteral Tube Orogastric 16 Fr 7 days          Airway            ETT  Oral;Hi-Lo; Cuffed 7 5 mm 7 days    ETT  Oral;Hi-Lo 7 5 mm less than 1 day              Can any invasive devices be discontinued today?  No  ---------------------------------------------------------------------------------------  OBJECTIVE    Vitals   Vitals:    21 0000 21 0019 21 0100 21 0250   BP:       BP Location:       Pulse: 90  88    Resp:   19    Temp: 98 2 °F (36 8 °C)  98 6 °F (37 °C)    TempSrc: Esophageal      SpO2: 97% 97% 98% 99%   Weight:       Height:         Temp (24hrs), Av 6 °F (37 °C), Min:97 5 °F (36 4 °C), Max:100 °F (37 8 °C)  Current: Temperature: 98 6 °F (37 °C)    Respiratory:  SpO2 Device: O2 Device: Ventilator       Invasive/non-invasive ventilation settings   Respiratory    Lab Data (Last 4 hours)    None         O2/Vent Data (Last 4 hours)    None                Physical Exam  Vitals signs and nursing note reviewed  Constitutional:       Appearance: He is ill-appearing  Comments: Sedated and intubated   HENT:      Head: Normocephalic and atraumatic  Right Ear: External ear normal       Left Ear: External ear normal    Eyes:      Conjunctiva/sclera: Conjunctivae normal    Neck:      Comments: RIJ CVC in place without purulence or bleeding  Cardiovascular:      Rate and Rhythm: Normal rate and regular rhythm  Pulses: Normal pulses  Heart sounds: Normal heart sounds  No murmur  Pulmonary:      Effort: Pulmonary effort is normal  No respiratory distress  Breath sounds: No wheezing or rales  Comments: Mechanical breath sounds, equal b/l  Abdominal:      General: Abdomen is flat  Bowel sounds are normal       Palpations: Abdomen is soft  Genitourinary:     Comments: Veras in place, draining yellow urine without blood or clots   Musculoskeletal:         General: Swelling present  Left lower leg: No edema  Comments: RLE AKA bandaged, no weeping, purulence, blood through wrapping  RUE and LUE edema with small blistering on right side   Skin:     General: Skin is warm and dry  Capillary Refill: Capillary refill takes less than 2 seconds  Findings: No rash        Comments: Small clear-yellow fluid-filled blisters on RUE without crusting, underlying rash, or signs of infection  IV and A-line in place without bleeding, pulse present   Neurological:      Comments: Sedated, not arousable, did not respond to verbal, physical, or noxious stimuli   Psychiatric:      Comments: Sedated         Laboratory and Diagnostics:  Results from last 7 days   Lab Units 01/19/21  0324 01/18/21  9674 01/18/21  1702 01/18/21  0352 01/17/21  0755 01/16/21  1819 01/16/21  0743 01/16/21  0437  01/15/21  1723  01/14/21  2359  01/13/21  1611 01/13/21  0440  01/12/21  0618   WBC Thousand/uL 19 20*  --  17 84* 18 81* 10 55* 10 93* 10 66* 10 23*   < > 9 54   < > 8 46   < > 7 65 6 71   < > 11 76*   HEMOGLOBIN g/dL 8 1* 7 7* 6 8* 8 3* 8 2* 7 9* 8 0* 8 1*   < > 6 8*   < > 7 8*   < > 9 1* 9 7*   < > 12 9   I STAT HEMOGLOBIN   --   --   --   --   --   --   --   --   --   --   --   --    < >  --   --    < >  --    HEMATOCRIT % 23 9*  --  21 3* 25 8* 24 2* 23 8* 24 1* 24 1*   < > 20 6*   < > 23 5*   < > 26 5* 29 2*   < > 37 5   HEMATOCRIT, ISTAT   --   --   --   --   --   --   --   --   --   --   --   --    < >  --   --    < >  --    PLATELETS Thousands/uL 167  --  159 155 101* 76* 60* 61*   < > 58*   < > 40*   < > 34* 44*   < > 108*   NEUTROS PCT %  --   --   --   --   --   --  84* 87*  --  90*  --   --   --   --   --   --  91*   BANDS PCT %  --   --   --  2  --   --   --   --   --   --   --   --   --  14* 23*  --   --    MONOS PCT %  --   --   --   --   --   --  10 7  --  6  --   --   --   --   --   --  3*   MONO PCT %  --   --   --  1*  --   --   --   --   --   --   --  1*  --  2* 3*  --   --     < > = values in this interval not displayed       Results from last 7 days   Lab Units 01/19/21  0334 01/18/21  1702 01/18/21  0352 01/17/21  0645 01/17/21  0507 01/16/21  1819 01/16/21  0743 01/16/21  0437  01/13/21  0440   SODIUM mmol/L 138 140 142 143 143 145 147* 147*   < > 151*  151*   POTASSIUM mmol/L 4 5 4 5 4 7 4 1 4 2 3 7 4 1 3 9   < > 4 6  4 6   CHLORIDE mmol/L 109* 111* 111* 113* 113* 115* 119* 119*   < > 122*  120*   CO2 mmol/L 26 26 25 24 26 24 23 23   < > 21  21   CO2, I-STAT   --   --   --   --   --   --   --   --    < >  --    ANION GAP mmol/L 3* 3* 6 6 4 6 5 5   < > 8  10   BUN mg/dL 25 22 18 20 21 22 21 20   < > 29*  30*   CREATININE mg/dL 0 72 0 77 0 74 0 80 0 74 0 78 0 65 0 66   < > 0 83  0 75   CALCIUM mg/dL 7 7* 7 1* 7 6* 7 5* 7 5* 7 3* 7 3* 7 1*   < > 7 0*  7 2*   GLUCOSE RANDOM mg/dL 199* 179* 188* 133 159* 143* 159* 145*   < > 172*  172*   ALT U/L  --   --   --   --   --   --   --  82*  --  33   AST U/L  --   --   --   --   --   --   --  139*  --  77*   ALK PHOS U/L  --   --   --   --   --   --   --  104  --  77   ALBUMIN g/dL  --   --   --   --   --   --   --  1 6*  --  1 4*   TOTAL BILIRUBIN mg/dL  --   --   --   --   --   --   --  0 73  --  0 50    < > = values in this interval not displayed  Results from last 7 days   Lab Units 01/19/21  0334 01/18/21  0352 01/17/21  0645 01/17/21  0507 01/16/21  1819 01/16/21  0743 01/15/21  2002   MAGNESIUM mg/dL 2 0 2 1 1 8 1 8 1 8 2 0 2 1   PHOSPHORUS mg/dL 3 4 3 3 1 9* 2 2* 2 3 2 0* 2 3      Results from last 7 days   Lab Units 01/19/21  0311 01/18/21 2038 01/18/21  1412 01/18/21  0352 01/17/21  2024 01/17/21  1418 01/17/21  0507  01/14/21  2356  01/12/21  1831   INR   --   --   --   --   --   --   --   --  1 44*  --  1 70*   PTT seconds 70* 66* 73* 107* 76* 67* 57*   < > 37   < > 34    < > = values in this interval not displayed  Results from last 7 days   Lab Units 01/13/21  2328 01/13/21  1205 01/12/21  1301 01/12/21  1008   LACTIC ACID mmol/L 1 4 1 7 2 4* 2 8*     ABG:  Results from last 7 days   Lab Units 01/17/21  1204   PH ART  7 507*   PCO2 ART mm Hg 29 5*   PO2 ART mm Hg 98 4   HCO3 ART mmol/L 22 9   BASE EXC ART mmol/L 0 2   ABG SOURCE  Line, Arterial     VBG:  Results from last 7 days   Lab Units 01/17/21  1204  01/12/21  1407   PH REAL   --   --  7 322   PCO2 REAL mm Hg  --   --  33 3*   PO2 REAL mm Hg  --   --  70 6*   HCO3 REAL mmol/L  --   --  16 8*   BASE EXC REAL mmol/L  --   --  -8 3   ABG SOURCE  Line, Arterial   < >  --     < > = values in this interval not displayed       Results from last 7 days   Lab Units 01/19/21  0436 01/14/21  0408 01/13/21  0440 01/12/21  0638   PROCALCITONIN ng/ml 0 96* 1 13* 1 64* 0 65*       Micro  Results from last 7 days   Lab Units 01/15/21  0621 01/15/21  0620 01/12/21  0640   BLOOD CULTURE  No Growth at 72 hrs  No Growth at 72 hrs   --    URINE CULTURE   --   --  No Growth <1000 cfu/mL       EKG: N/A  Imaging: No new imaging     Intake and Output  I/O       01/17 0701 - 01/18 0700 01/18 0701 - 01/19 0700    I V  (mL/kg) 2732 1 (33 2) 2682 (32 5)    NG/ 230    IV Piggyback 200 190    Feedings 1497 789    Total Intake(mL/kg) 5009 1 (60 8) 3891 (47 2)    Urine (mL/kg/hr) 3800 (1 9) 3325 (1 7)    Blood  150    Total Output 3800 3475    Net +1209 1 +416              UOP: 172 ml/hr     Height and Weights   Height: 5' 6" (167 6 cm)  IBW: 63 8 kg  Body mass index is 29 32 kg/m²  Weight (last 2 days)     Date/Time   Weight    01/18/21 0555   82 4 (181 66)    01/17/21 0535   82 3 (181 44)                Nutrition       Diet Orders   (From admission, onward)             Start     Ordered    01/18/21 1328  Diet Enteral/Parenteral; Tube Feeding No Oral Diet; Glucerna 1 2; Continuous; 56; 200; Water; Every 6 hours  Diet effective now     Question Answer Comment   Diet Type Enteral/Parenteral    Enteral/Parenteral Tube Feeding No Oral Diet    Tube Feeding Formula: Glucerna 1 2    Bolus/Cyclic/Continuous Continuous    Tube Feeding Goal Rate (mL/hr): 56    Tube Feeding flush (mL): 200    Water Flush type: Water    Water flush frequency: Every 6 hours    RD to adjust diet per protocol? Yes        01/18/21 1328              TF currently running at 56 ml/hr with a goal of 56 ml/hr   Formula: Glucerna 1 2       Active Medications  Scheduled Meds:  Current Facility-Administered Medications   Medication Dose Route Frequency Provider Last Rate    acetaminophen  650 mg Oral Q6H PRN Elida Souza MD      atorvastatin  40 mg Oral HS Elida Souza MD      bisacodyl  10 mg Rectal Daily PRN Elida Souza MD      cefTRIAXone  1,000 mg Intravenous Q24H Elida Souza MD Stopped (01/18/21 1722)   Vishal Moose chlorhexidine  15 mL Swish & Spit Q12H [facility] Max Padilla MD      cholecalciferol  2,000 Units Oral Daily Max Padilla MD      dexamethasone  0 1 mg/kg Intravenous Q12H Max Padilla MD      doxycycline hyclate  100 mg Oral Q12H [facility] Tan Trevino PA-C      gabapentin  100 mg Oral TID Marilyn Bhandari PA-C      heparin (porcine)  3-30 Units/kg/hr (Order-Specific) Intravenous Titrated Max Padilla MD 12 Units/kg/hr (01/18/21 1247)    heparin (porcine)  3,000 Units Intravenous Q1H PRN Max Padilla MD      heparin (porcine)  6,000 Units Intravenous Q1H PRN Max Padilla MD      HYDROmorphone  2 mg/hr Intravenous Continuous Max Padilla MD 2 mg/hr (01/18/21 1247)    HYDROmorphone  2 mg Intravenous Q2H PRN Ryan Trevino PA-C      insulin regular (HumuLIN R,NovoLIN R) infusion  0 3-21 Units/hr Intravenous Titrated Max Padilla MD 9 Units/hr (01/19/21 0354)    ketamine  1 mg/kg/hr Intravenous Continuous Max Padilla MD 1 mg/kg/hr (01/18/21 2237)    levalbuterol  1 25 mg Nebulization Q6H Max Padilla MD      methadone  10 mg Intravenous Q8H [address] [employer], PAKhurram      midazolam  1 mg Intravenous Q1H PRN Max Padilla MD      multivitamin-minerals  1 tablet Oral Daily Max Padilla MD      norepinephrine  1-30 mcg/min Intravenous Titrated Max Padilla MD 2 mcg/min (01/18/21 1428)    pantoprazole  40 mg Intravenous Q12H [facility] Libby Hernandez MD      polyethylene glycol  17 g Oral Daily Max Padilla MD      propofol  5-50 mcg/kg/min Intravenous Titrated Max Padilla MD 50 mcg/kg/min (01/18/21 2330)    senna  2 tablet Oral BID Max Padilla MD      sodium chloride  4 mL Nebulization Q6H Max aPdilla MD       Continuous Infusions:  heparin (porcine), 3-30 Units/kg/hr (Order-Specific), Last Rate: 12 Units/kg/hr (01/18/21 1247)  HYDROmorphone, 2 mg/hr, Last Rate: 2 mg/hr (01/18/21 1247)  insulin regular (HumuLIN R,NovoLIN R) infusion, 0 3-21 Units/hr, Last Rate: 9 Units/hr (01/19/21 2664)  ketamine, 1 mg/kg/hr, Last Rate: 1 mg/kg/hr (01/18/21 8147)  norepinephrine, 1-30 mcg/min, Last Rate: 2 mcg/min (01/18/21 1428)  propofol, 5-50 mcg/kg/min, Last Rate: 50 mcg/kg/min (01/18/21 7170)      PRN Meds:   acetaminophen, 650 mg, Q6H PRN  bisacodyl, 10 mg, Daily PRN  heparin (porcine), 3,000 Units, Q1H PRN  heparin (porcine), 6,000 Units, Q1H PRN  HYDROmorphone, 2 mg, Q2H PRN  midazolam, 1 mg, Q1H PRN        Allergies   Allergies   Allergen Reactions    Varenicline      ---------------------------------------------------------------------------------------  Advance Directive and Living Will:      Power of :    POLST:    ---------------------------------------------------------------------------------------    Kerline Mukherjee MD      Portions of the record may have been created with voice recognition software  Occasional wrong word or "sound a like" substitutions may have occurred due to the inherent limitations of voice recognition software    Read the chart carefully and recognize, using context, where substitutions have occurred

## 2021-01-19 NOTE — PROGRESS NOTES
Progress Note - Acute Pain Service    Codi Lockwood 56 y o  male MRN: [id number]  Unit/Bed#: MICU 11 Encounter: [id number]      Assessment:   Principal Problem:    Pneumonia due to COVID-19 virus  Active Problems:    Diabetic ketoacidosis without coma associated with type 2 diabetes mellitus ([facility] )    Acute metabolic encephalopathy    Acute kidney injury ([facility] )    Acute respiratory failure with hypoxia (HCC)    Sepsis due to COVID-19 ([facility] )    Hypernatremia    Metabolic acidosis, increased anion gap    Aortic thrombus ([facility] )    Codi Lockwood is a 56 y o  male with past medical history significant for hypertension, hyperlipidemia, GERD, hepatitis-C, diabetes mellitus, opioid use disorder chronically on methadone 70 mg daily who initially presented to [facility] with nausea and vomiting was had complicated hospitalization including DKA which has been treated but remains on insulin infusion, COVID leading to acute hypoxic respiratory failure requiring intubation, encephalopathy, elevation in transaminases, acute kidney injury which has resolved, pulmonary embolism, hematemesis, hematuria status post cystoscopy and catheter placement  Patient also noted to have ischemic right lower extremity for which vascular surgery was consulted who started patient on heparin infusion it was transferred to [facility] is now status post AKA on 01/14/2020  Acute limb ischemia likely in the setting of aortic thrombus  Patient continues on heparin infusion  He is requiring pressors and significant levels of sedation including propofol, ketamine, midazolam   Analgesia with hydromorphone infusion  IV methadone was started in lieu of home oral methadone    Patient underwent formalization of RLE AKA on 01/18/2021      Plan:   - sedation per primary team with propofol, midazolam and ketamine (consider significant decrease in ketamine infusion rate given methadone has been restarted (both provide Angeline Mic[address] blockade))  - continue methadone 10 mg IV tid scheduled (home dose 70 mg po)  - decrease hydromorphone infusion to 1 mg/hr  - change hydromorphone to 1 mg IV q 1 hours p r n  for breakthrough pain  - patient is on dexamethasone per primary team  - bowel regimen with senna amrit, bisacodyl prn , polyethylene glycol amrit     APS will continue to follow  Please call  / 2052 or [facility] [employer] [facility] Acute Pain Service - [facility] (/ between [phone] and on weekends) with questions or concerns    Pain History  Current pain location(s): NA  Pain Scale:   NA  Quality: NA  24 hour history:  Examined from outside room given COVID positive status  Patient underwent formalization of right lower extremity AKA without complication  Patient received 1 unit PRBC given globin 6 8  Patient was restarted on methadone  Unable to obtain review of systems as patient is sedated and intubated      Opioid requirement previous 24 hours:   Methadone 30 mg IV  Fentanyl 100 mcg IV  Hydromorphone 48 mg IV    Meds/Allergies   all current active meds have been reviewed and current meds:   Current Facility-Administered Medications   Medication Dose Route Frequency    acetaminophen (TYLENOL) tablet 650 mg  650 mg Oral Q6H PRN    atorvastatin (LIPITOR) tablet 40 mg  40 mg Oral HS    bisacodyl (DULCOLAX) rectal suppository 10 mg  10 mg Rectal Daily PRN    cefTRIAXone (ROCEPHIN) 1,000 mg in dextrose 5 % 50 mL IVPB  1,000 mg Intravenous Q24H    chlorhexidine (PERIDEX) 0 12 % oral rinse 15 mL  15 mL Swish & Spit Q12H [address]    cholecalciferol (VITAMIN D3) tablet 2,000 Units  2,000 Units Oral Daily    dexamethasone (PF) (DECADRON) injection 7 3 mg  0 1 mg/kg Intravenous Q12H    doxycycline hyclate (VIBRAMYCIN) capsule 100 mg  100 mg Oral Q12H [address]    gabapentin (NEURONTIN) capsule 100 mg  100 mg Oral TID    heparin (porcine) 25,000 units in 0 45% NaCl 250 mL infusion (premix)  3-30 Units/kg/hr (Order-Specific) Intravenous Titrated    heparin (porcine) injection 3,000 Units  3,000 Units Intravenous Q1H PRN    heparin (porcine) injection 6,000 Units  6,000 Units Intravenous Q1H PRN    HYDROmorphone (DILAUDID) 50 mg in sodium chloride 0 9% 50mL drip  2 mg/hr Intravenous Continuous    HYDROmorphone (DILAUDID) injection 2 mg  2 mg Intravenous Q2H PRN    insulin regular (HumuLIN R,NovoLIN R) 1 Units/mL in sodium chloride 0 9 % 100 mL infusion  0 3-21 Units/hr Intravenous Titrated    ketamine 500 mg (DOUBLE CONCENTRATION) IV in sodium chloride 0 9% 250 mL  1 mg/kg/hr Intravenous Continuous    levalbuterol (XOPENEX) inhalation solution 1 25 mg  1 25 mg Nebulization Q6H    methadone (DOLOPHINE) injection 10 mg  10 mg Intravenous Q8H [facility]    midazolam (VERSED) injection 1 mg  1 mg Intravenous Q1H PRN    multivitamin-minerals (CENTRUM ADULTS) tablet 1 tablet  1 tablet Oral Daily    norepinephrine (LEVOPHED) 4 mg (STANDARD CONCENTRATION) IV in sodium chloride 0 9% 250 mL  1-30 mcg/min Intravenous Titrated    pantoprazole (PROTONIX) injection 40 mg  40 mg Intravenous Q12H LIZETT    polyethylene glycol (MIRALAX) packet 17 g  17 g Oral Daily    propofol (DIPRIVAN) 1000 mg in 100 mL infusion (premix)  5-50 mcg/kg/min Intravenous Titrated    senna (SENOKOT) tablet 17 2 mg  2 tablet Oral BID    sodium chloride 3 % inhalation solution 4 mL  4 mL Nebulization Q6H       Allergies   Allergen Reactions    Varenicline        Objective     Temp:  [97 5 °F (36 4 °C)-100 °F (37 8 °C)] 98 2 °F (36 8 °C)  HR:  [] 94  Resp:  [19-39] 22  BP: (111)/(50) 111/50  Arterial Line BP: (106-160)/(40-62) 116/50    Physical Exam  Constitutional:       Comments: P/E performed by doorway  Relaxed, not in acute distress  BMI 30   HENT:      Head: Normocephalic  Cardiovascular:      Rate and Rhythm: Normal rate and regular rhythm     Pulmonary:      Comments: intubated  Genitourinary:     Comments: Veras        Lab Results:   Results from last 7 days   Lab Units 01/19/21  0324   WBC Thousand/uL 19 20*   HEMOGLOBIN g/dL 8 1*   HEMATOCRIT % 23 9*   PLATELETS Thousands/uL 167      Results from last 7 days   Lab Units 01/19/21  0334  01/16/21  0437  01/13/21  2202   POTASSIUM mmol/L 4 5   < > 3 9   < >  --    CHLORIDE mmol/L 109*   < > 119*   < >  --    CO2 mmol/L 26   < > 23   < >  --    CO2, I-STAT mmol/L  --   --   --   --  23   BUN mg/dL 25   < > 20   < >  --    CREATININE mg/dL 0 72   < > 0 66   < >  --    CALCIUM mg/dL 7 7*   < > 7 1*   < >  --    ALK PHOS U/L  --   --  104  --   --    ALT U/L  --   --  82*  --   --    AST U/L  --   --  139*  --   --    GLUCOSE, ISTAT mg/dl  --   --   --   --  179*    < > = values in this interval not displayed  Imaging Studies: I have personally reviewed pertinent reports  EKG, Pathology, and Other Studies: I have personally reviewed pertinent reports  Please note that the APS provides consultative services regarding pain management only  With the exception of ketamine and epidural infusions and except when indicated, final decisions regarding starting or changing doses of analgesic medications are at the discretion of the consulting service  Off hours consultation and/or medication management is generally not available      Annamaria Camarena MD  Acute Pain Service

## 2021-01-19 NOTE — QUICK NOTE
Informed by nursing that pt's original rogers catheter placement was complicated and he has significant trauma in  area  Today is day 7 of rogers catheter placement by urology and recommended to keep for at least 7 days  Agree with RN to hold on rogers removal at this time as pt is being weaned from sedation and may be extubated soon  Sedation now weaned to ketamine 0, propofol 35  Will work on weaning dilaudid now by 0 5 increments to hopefully stop by tomorrow  Ordered CXR and ABG for tomorrow morning  If pt continues to make progress and no longer has obvious barriers to extubation, would consider switching to PSV tomorrow morning  Update: Called and updated pt's nephew, Danica Teague  Discussed pt status and management plan, including sedation wean and barriers to extubation  All questions were answered

## 2021-01-19 NOTE — OCCUPATIONAL THERAPY NOTE
Occupational Therapy         Patient Name: Pauly Silva  QBOIA'H Date: 1/19/2021 01/19/21 1400   OT Last Visit   OT Visit Date 01/19/21   Note Type   Note type Evaluation   Cancel Reasons Intubated/sedated     Remains intubated -not medically appropriate for OT intervention - will d/c from caseload - please reconsult when pt more medically stable and able to participate in therapy    Minerva Whitaker, OT

## 2021-01-19 NOTE — RESPIRATORY THERAPY NOTE
RT Ventilator Management Note  Ciro Otero 56 y o  male MRN: [id number]  Unit/Bed#: [facility] 11 Encounter: [id number]      Daily Screen       1/18/2021  0725 1/19/2021  0732          Patient safety screen outcome[de-identified]  Failed  Failed  (Pended)       Not Ready for Weaning due to[de-identified]  Underline problem not resolved  Underline problem not resolved  (Pended)               Physical Exam:   Assessment Type: Assess only  General Appearance: Sedated  Respiratory Pattern: Assisted  Chest Assessment: Chest expansion symmetrical  Bilateral Breath Sounds: Diminished, Coarse  R Breath Sounds: Diminished  O2 Device: vent  Subjective Data: intubated      Resp Comments: Pt continues on ASV settings  No acute resp distress noted  FiO2 titrated at this time, SpO2 WNL  Will continue to monitor per resp protocol

## 2021-01-19 NOTE — RESPIRATORY THERAPY NOTE
RT Ventilator Management Note  Whitney Roman 56 y o  male MRN: [id number]  Unit/Bed#: [facility] 11 Encounter: [id number]      Daily Screen       1/17/2021  1533 1/18/2021  0725          Patient safety screen outcome[de-identified]  Failed  Failed      Not Ready for Weaning due to[de-identified]  Underline problem not resolved  Underline problem not resolved              Physical Exam:   Assessment Type: (P) Assess only  General Appearance: (P) Sedated  Respiratory Pattern: (P) Assisted  Chest Assessment: (P) Chest expansion symmetrical  Bilateral Breath Sounds: (P) Diminished, Coarse  R Breath Sounds: Diminished  Suction: ET Tube  O2 Device: (P) vent  Subjective Data: (P) intubated      Resp Comments: (P) No changes made overnight  Pt is stable on current ASV settings  Will cont to monitor pt per protocol while on vent

## 2021-01-19 NOTE — PHYSICAL THERAPY NOTE
Physical Therapy Cancellation Note    PT orders received chart review completed  Pt is currently intubated/sedated and not appropriate to participate in skilled PT at this time  PT will sign off at this time, please re-consult when medically stable to participate       01/19/21 1200   Note Type   Cancel Reasons Intubated/sedated       Amber Crawford, PT

## 2021-01-19 NOTE — PLAN OF CARE
Problem: Prexisting or High Potential for Compromised Skin Integrity  Goal: Skin integrity is maintained or improved  Description: INTERVENTIONS:  - Identify patients at risk for skin breakdown  - Assess and monitor skin integrity  - Assess and monitor nutrition and hydration status  - Monitor labs   - Assess for incontinence   - Turn and reposition patient  - Assist with mobility/ambulation  - Relieve pressure over bony prominences  - Avoid friction and shearing  - Provide appropriate hygiene as needed including keeping skin clean and dry  - Evaluate need for skin moisturizer/barrier cream  - Collaborate with interdisciplinary team   - Patient/family teaching  - Consider wound care consult   Outcome: Progressing     Problem: Potential for Falls  Goal: Patient will remain free of falls  Description: INTERVENTIONS:  - Assess patient frequently for physical needs  -  Identify cognitive and physical deficits and behaviors that affect risk of falls    -  [place] fall precautions as indicated by assessment   - Educate patient/family on patient safety including physical limitations  - Instruct patient to call for assistance with activity based on assessment  - Modify environment to reduce risk of injury  - Consider OT/PT consult to assist with strengthening/mobility  Outcome: Progressing

## 2021-01-19 NOTE — NUTRITION
In light of Propofol at 17 9 ml/hr, recommend adjust TF goal rate to Glucerna 1 2 @ 52 ml/hr to provide qd: 1248 ml,1498 Kcal,75 gm PRO,143 gm CHO,75 gm Fat,1105 ml Free H2O,1 2 mg CHO/kg/min  Check Ionized Ca  Monitor [place] level

## 2021-01-20 ENCOUNTER — APPOINTMENT (INPATIENT)
Dept: RADIOLOGY | Facility: HOSPITAL | Age: 61
DRG: 853 | End: 2021-01-20
Payer: COMMERCIAL

## 2021-01-20 LAB
ANION GAP SERPL CALCULATED.3IONS-SCNC: 5 MMOL/L (ref 4–13)
APTT PPP: 52 SECONDS (ref 23–37)
APTT PPP: 64 SECONDS (ref 23–37)
BACTERIA BLD CULT: NORMAL
BACTERIA BLD CULT: NORMAL
BASE EXCESS BLDA CALC-SCNC: 1.2 MMOL/L
BUN SERPL-MCNC: 24 MG/DL (ref 5–25)
CA-I BLD-SCNC: 1.08 MMOL/L (ref 1.12–1.32)
CALCIUM SERPL-MCNC: 7.7 MG/DL (ref 8.3–10.1)
CHLORIDE SERPL-SCNC: 106 MMOL/L (ref 100–108)
CO2 SERPL-SCNC: 27 MMOL/L (ref 21–32)
CREAT SERPL-MCNC: 0.71 MG/DL (ref 0.6–1.3)
ERYTHROCYTE [DISTWIDTH] IN BLOOD BY AUTOMATED COUNT: 15.3 % (ref 11.6–15.1)
GFR SERPL CREATININE-BSD FRML MDRD: 101 ML/MIN/1.73SQ M
GLUCOSE SERPL-MCNC: 111 MG/DL (ref 65–140)
GLUCOSE SERPL-MCNC: 131 MG/DL (ref 65–140)
GLUCOSE SERPL-MCNC: 135 MG/DL (ref 65–140)
GLUCOSE SERPL-MCNC: 138 MG/DL (ref 65–140)
GLUCOSE SERPL-MCNC: 141 MG/DL (ref 65–140)
GLUCOSE SERPL-MCNC: 148 MG/DL (ref 65–140)
GLUCOSE SERPL-MCNC: 154 MG/DL (ref 65–140)
GLUCOSE SERPL-MCNC: 158 MG/DL (ref 65–140)
GLUCOSE SERPL-MCNC: 158 MG/DL (ref 65–140)
GLUCOSE SERPL-MCNC: 171 MG/DL (ref 65–140)
GLUCOSE SERPL-MCNC: 173 MG/DL (ref 65–140)
GLUCOSE SERPL-MCNC: 184 MG/DL (ref 65–140)
GLUCOSE SERPL-MCNC: 96 MG/DL (ref 65–140)
HCO3 BLDA-SCNC: 25 MMOL/L (ref 22–28)
HCT VFR BLD AUTO: 19.8 % (ref 36.5–49.3)
HCT VFR BLD AUTO: 21.3 % (ref 36.5–49.3)
HCT VFR BLD AUTO: 21.7 % (ref 36.5–49.3)
HGB BLD-MCNC: 6.6 G/DL (ref 12–17)
HGB BLD-MCNC: 7 G/DL (ref 12–17)
HGB BLD-MCNC: 7.1 G/DL (ref 12–17)
MAGNESIUM SERPL-MCNC: 2 MG/DL (ref 1.6–2.6)
MCH RBC QN AUTO: 29.6 PG (ref 26.8–34.3)
MCHC RBC AUTO-ENTMCNC: 32.7 G/DL (ref 31.4–37.4)
MCV RBC AUTO: 90 FL (ref 82–98)
NRBC BLD AUTO-RTO: 1 /100 WBCS
O2 CT BLDA-SCNC: 9.5 ML/DL (ref 16–23)
OXYHGB MFR BLDA: 90.4 % (ref 94–97)
PCO2 BLDA: 35.9 MM HG (ref 36–44)
PH BLDA: 7.46 [PH] (ref 7.35–7.45)
PHOSPHATE SERPL-MCNC: 3.7 MG/DL (ref 2.3–4.1)
PLATELET # BLD AUTO: 179 THOUSANDS/UL (ref 149–390)
PMV BLD AUTO: 12.1 FL (ref 8.9–12.7)
PO2 BLDA: 62.5 MM HG (ref 75–129)
POTASSIUM SERPL-SCNC: 4.4 MMOL/L (ref 3.5–5.3)
RBC # BLD AUTO: 2.4 MILLION/UL (ref 3.88–5.62)
SODIUM SERPL-SCNC: 138 MMOL/L (ref 136–145)
WBC # BLD AUTO: 14.61 THOUSAND/UL (ref 4.31–10.16)

## 2021-01-20 PROCEDURE — 99291 CRITICAL CARE FIRST HOUR: CPT | Performed by: INTERNAL MEDICINE

## 2021-01-20 PROCEDURE — 82330 ASSAY OF CALCIUM: CPT | Performed by: PHYSICIAN ASSISTANT

## 2021-01-20 PROCEDURE — 94760 N-INVAS EAR/PLS OXIMETRY 1: CPT

## 2021-01-20 PROCEDURE — 85730 THROMBOPLASTIN TIME PARTIAL: CPT | Performed by: EMERGENCY MEDICINE

## 2021-01-20 PROCEDURE — 82805 BLOOD GASES W/O2 SATURATION: CPT | Performed by: FAMILY MEDICINE

## 2021-01-20 PROCEDURE — 82948 REAGENT STRIP/BLOOD GLUCOSE: CPT

## 2021-01-20 PROCEDURE — 94003 VENT MGMT INPAT SUBQ DAY: CPT

## 2021-01-20 PROCEDURE — 85014 HEMATOCRIT: CPT | Performed by: PHYSICIAN ASSISTANT

## 2021-01-20 PROCEDURE — 83735 ASSAY OF MAGNESIUM: CPT | Performed by: PHYSICIAN ASSISTANT

## 2021-01-20 PROCEDURE — P9016 RBC LEUKOCYTES REDUCED: HCPCS

## 2021-01-20 PROCEDURE — 85018 HEMOGLOBIN: CPT | Performed by: PHYSICIAN ASSISTANT

## 2021-01-20 PROCEDURE — 85018 HEMOGLOBIN: CPT | Performed by: EMERGENCY MEDICINE

## 2021-01-20 PROCEDURE — 84100 ASSAY OF PHOSPHORUS: CPT | Performed by: PHYSICIAN ASSISTANT

## 2021-01-20 PROCEDURE — 85730 THROMBOPLASTIN TIME PARTIAL: CPT | Performed by: INTERNAL MEDICINE

## 2021-01-20 PROCEDURE — C9113 INJ PANTOPRAZOLE SODIUM, VIA: HCPCS | Performed by: SURGERY

## 2021-01-20 PROCEDURE — 85014 HEMATOCRIT: CPT | Performed by: EMERGENCY MEDICINE

## 2021-01-20 PROCEDURE — 99223 1ST HOSP IP/OBS HIGH 75: CPT | Performed by: STUDENT IN AN ORGANIZED HEALTH CARE EDUCATION/TRAINING PROGRAM

## 2021-01-20 PROCEDURE — 71045 X-RAY EXAM CHEST 1 VIEW: CPT

## 2021-01-20 PROCEDURE — 94640 AIRWAY INHALATION TREATMENT: CPT

## 2021-01-20 PROCEDURE — 80048 BASIC METABOLIC PNL TOTAL CA: CPT | Performed by: PHYSICIAN ASSISTANT

## 2021-01-20 PROCEDURE — 85027 COMPLETE CBC AUTOMATED: CPT | Performed by: PHYSICIAN ASSISTANT

## 2021-01-20 RX ORDER — FUROSEMIDE 10 MG/ML
20 INJECTION INTRAMUSCULAR; INTRAVENOUS ONCE
Status: COMPLETED | OUTPATIENT
Start: 2021-01-20 | End: 2021-01-20

## 2021-01-20 RX ORDER — GABAPENTIN 100 MG/1
200 CAPSULE ORAL 3 TIMES DAILY
Status: DISCONTINUED | OUTPATIENT
Start: 2021-01-20 | End: 2021-01-22

## 2021-01-20 RX ORDER — ALBUMIN, HUMAN INJ 5% 5 %
12.5 SOLUTION INTRAVENOUS ONCE
Status: COMPLETED | OUTPATIENT
Start: 2021-01-20 | End: 2021-01-20

## 2021-01-20 RX ADMIN — ENOXAPARIN SODIUM 80 MG: 80 INJECTION SUBCUTANEOUS at 20:22

## 2021-01-20 RX ADMIN — PROPOFOL 40 MCG/KG/MIN: 10 INJECTION, EMULSION INTRAVENOUS at 09:38

## 2021-01-20 RX ADMIN — GABAPENTIN 200 MG: 100 CAPSULE ORAL at 15:47

## 2021-01-20 RX ADMIN — SODIUM CHLORIDE SOLN NEBU 3% 4 ML: 3 NEBU SOLN at 13:37

## 2021-01-20 RX ADMIN — ENOXAPARIN SODIUM 80 MG: 80 INJECTION SUBCUTANEOUS at 15:46

## 2021-01-20 RX ADMIN — SODIUM CHLORIDE SOLN NEBU 3% 4 ML: 3 NEBU SOLN at 00:01

## 2021-01-20 RX ADMIN — MIDAZOLAM 1 MG: 1 INJECTION INTRAMUSCULAR; INTRAVENOUS at 04:33

## 2021-01-20 RX ADMIN — ACETAMINOPHEN 650 MG: 325 TABLET, FILM COATED ORAL at 21:47

## 2021-01-20 RX ADMIN — PANTOPRAZOLE SODIUM 40 MG: 40 INJECTION, POWDER, FOR SOLUTION INTRAVENOUS at 20:22

## 2021-01-20 RX ADMIN — CHLORHEXIDINE GLUCONATE 0.12% ORAL RINSE 15 ML: 1.2 LIQUID ORAL at 08:07

## 2021-01-20 RX ADMIN — ATORVASTATIN CALCIUM 40 MG: 40 TABLET, FILM COATED ORAL at 21:39

## 2021-01-20 RX ADMIN — SENNOSIDES 17.2 MG: 8.6 TABLET, FILM COATED ORAL at 18:10

## 2021-01-20 RX ADMIN — METHADONE HYDROCHLORIDE 10 MG: 10 INJECTION, SOLUTION INTRAMUSCULAR; INTRAVENOUS; SUBCUTANEOUS at 21:39

## 2021-01-20 RX ADMIN — CEFTRIAXONE 1000 MG: 1 INJECTION, POWDER, FOR SOLUTION INTRAMUSCULAR; INTRAVENOUS at 13:59

## 2021-01-20 RX ADMIN — SENNOSIDES 17.2 MG: 8.6 TABLET, FILM COATED ORAL at 08:08

## 2021-01-20 RX ADMIN — DEXAMETHASONE SODIUM PHOSPHATE 7.3 MG: 10 INJECTION, SOLUTION INTRAMUSCULAR; INTRAVENOUS at 20:22

## 2021-01-20 RX ADMIN — DOXYCYCLINE 100 MG: 100 CAPSULE ORAL at 20:23

## 2021-01-20 RX ADMIN — SODIUM CHLORIDE SOLN NEBU 3% 4 ML: 3 NEBU SOLN at 07:15

## 2021-01-20 RX ADMIN — ALBUMIN (HUMAN) 12.5 G: 12.5 INJECTION, SOLUTION INTRAVENOUS at 21:39

## 2021-01-20 RX ADMIN — LEVALBUTEROL HYDROCHLORIDE 1.25 MG: 1.25 SOLUTION, CONCENTRATE RESPIRATORY (INHALATION) at 07:15

## 2021-01-20 RX ADMIN — DOXYCYCLINE 100 MG: 100 CAPSULE ORAL at 08:07

## 2021-01-20 RX ADMIN — METHADONE HYDROCHLORIDE 10 MG: 10 INJECTION, SOLUTION INTRAMUSCULAR; INTRAVENOUS; SUBCUTANEOUS at 06:00

## 2021-01-20 RX ADMIN — PANTOPRAZOLE SODIUM 40 MG: 40 INJECTION, POWDER, FOR SOLUTION INTRAVENOUS at 08:08

## 2021-01-20 RX ADMIN — CHLORHEXIDINE GLUCONATE 0.12% ORAL RINSE 15 ML: 1.2 LIQUID ORAL at 20:22

## 2021-01-20 RX ADMIN — Medication 1 TABLET: at 08:08

## 2021-01-20 RX ADMIN — GABAPENTIN 200 MG: 100 CAPSULE ORAL at 20:23

## 2021-01-20 RX ADMIN — LEVALBUTEROL HYDROCHLORIDE 1.25 MG: 1.25 SOLUTION, CONCENTRATE RESPIRATORY (INHALATION) at 19:51

## 2021-01-20 RX ADMIN — Medication 2000 UNITS: at 08:08

## 2021-01-20 RX ADMIN — Medication 1.75 MG/HR: at 13:36

## 2021-01-20 RX ADMIN — HEPARIN SODIUM 14 UNITS/KG/HR: 10000 INJECTION, SOLUTION INTRAVENOUS at 10:53

## 2021-01-20 RX ADMIN — PROPOFOL 45 MCG/KG/MIN: 10 INJECTION, EMULSION INTRAVENOUS at 18:31

## 2021-01-20 RX ADMIN — LEVALBUTEROL HYDROCHLORIDE 1.25 MG: 1.25 SOLUTION, CONCENTRATE RESPIRATORY (INHALATION) at 00:01

## 2021-01-20 RX ADMIN — GABAPENTIN 100 MG: 100 CAPSULE ORAL at 08:08

## 2021-01-20 RX ADMIN — SODIUM CHLORIDE 4 UNITS/HR: 9 INJECTION, SOLUTION INTRAVENOUS at 21:41

## 2021-01-20 RX ADMIN — HEPARIN SODIUM 3000 UNITS: 1000 INJECTION INTRAVENOUS; SUBCUTANEOUS at 06:26

## 2021-01-20 RX ADMIN — DEXAMETHASONE SODIUM PHOSPHATE 7.3 MG: 10 INJECTION, SOLUTION INTRAMUSCULAR; INTRAVENOUS at 07:57

## 2021-01-20 RX ADMIN — METHADONE HYDROCHLORIDE 10 MG: 10 INJECTION, SOLUTION INTRAMUSCULAR; INTRAVENOUS; SUBCUTANEOUS at 14:09

## 2021-01-20 RX ADMIN — PROPOFOL 30 MCG/KG/MIN: 10 INJECTION, EMULSION INTRAVENOUS at 22:41

## 2021-01-20 RX ADMIN — NOREPINEPHRINE BITARTRATE 2 MCG/MIN: 1 INJECTION, SOLUTION, CONCENTRATE INTRAVENOUS at 21:58

## 2021-01-20 RX ADMIN — PROPOFOL 35 MCG/KG/MIN: 10 INJECTION, EMULSION INTRAVENOUS at 05:29

## 2021-01-20 RX ADMIN — SODIUM CHLORIDE SOLN NEBU 3% 4 ML: 3 NEBU SOLN at 19:51

## 2021-01-20 RX ADMIN — PROPOFOL 40 MCG/KG/MIN: 10 INJECTION, EMULSION INTRAVENOUS at 14:46

## 2021-01-20 RX ADMIN — FUROSEMIDE 20 MG: 10 INJECTION, SOLUTION INTRAMUSCULAR; INTRAVENOUS at 13:10

## 2021-01-20 RX ADMIN — HYDROMORPHONE HYDROCHLORIDE 1 MG: 1 INJECTION, SOLUTION INTRAMUSCULAR; INTRAVENOUS; SUBCUTANEOUS at 20:16

## 2021-01-20 RX ADMIN — LEVALBUTEROL HYDROCHLORIDE 1.25 MG: 1.25 SOLUTION, CONCENTRATE RESPIRATORY (INHALATION) at 13:37

## 2021-01-20 RX ADMIN — POLYETHYLENE GLYCOL 3350 17 G: 17 POWDER, FOR SOLUTION ORAL at 08:07

## 2021-01-20 NOTE — PLAN OF CARE
Problem: Prexisting or High Potential for Compromised Skin Integrity  Goal: Skin integrity is maintained or improved  Description: INTERVENTIONS:  - Identify patients at risk for skin breakdown  - Assess and monitor skin integrity  - Assess and monitor nutrition and hydration status  - Monitor labs   - Assess for incontinence   - Turn and reposition patient  - Assist with mobility/ambulation  - Relieve pressure over bony prominences  - Avoid friction and shearing  - Provide appropriate hygiene as needed including keeping skin clean and dry  - Evaluate need for skin moisturizer/barrier cream  - Collaborate with interdisciplinary team   - Patient/family teaching  - Consider wound care consult   Outcome: Progressing     Problem: Potential for Falls  Goal: Patient will remain free of falls  Description: INTERVENTIONS:  - Assess patient frequently for physical needs  -  Identify cognitive and physical deficits and behaviors that affect risk of falls    -  [place] fall precautions as indicated by assessment   - Educate patient/family on patient safety including physical limitations  - Instruct patient to call for assistance with activity based on assessment  - Modify environment to reduce risk of injury  - Consider OT/PT consult to assist with strengthening/mobility  Outcome: Progressing

## 2021-01-20 NOTE — RESPIRATORY THERAPY NOTE
RT Ventilator Management Note  Pauly Winchester 56 y o  male MRN: [id number]  Unit/Bed#: [facility] 11 Encounter: [id number]      Daily Screen       1/18/2021  0725 1/19/2021  0732          Patient safety screen outcome[de-identified]  Failed  Failed      Not Ready for Weaning due to[de-identified]  Underline problem not resolved  Underline problem not resolved              Physical Exam:   Assessment Type: Assess only  General Appearance: Sedated  Respiratory Pattern: Assisted  Chest Assessment: Chest expansion symmetrical  Bilateral Breath Sounds: Diminished, Coarse  Suction: ET Tube  O2 Device: vent      Resp Comments: No vent changes made at this time  Pt is stable on ASV settings  Will cont to monitor pt per protocol

## 2021-01-20 NOTE — WOUND OSTOMY CARE
Progress Note - Wound   Wilboaz Covington 56 y o  male MRN: [id number]  Unit/Bed#: MICU 11 Encounter: [id number]        Assessment:   Patient is seen for wound follow up  Patient examined in bed  Patient is intubated and sedated and is dependent for positioning  Patient is incontinent of bowel and has an urinary catheter in place  Patient just had a right AKA  Patient examined with Rudi Ontiveros  Findings  1  POA DTI on sacrum      2  HA DTI on right index finger          Left heel intact    See flowsheets for details    Skin care plans:  1-Hydraguard to bilateral sacrum, buttock BID and PRN  2-Elevate heels to offload pressure  3-Ehob cushion in chair when out of bed  4-Moisturize skin daily with skin nourishing cream   5-Turn/reposition q2h or when medically stable for pressure re-distribution on skin  6-Allevyn foam to heel, juan w/P, peel foam check skin integrity q-shift  Change q3d  7-Leave right index finger wound open to air       Call or tigertext with any questions  Wound Care will continue to follow    Wound 01/12/21 Pressure Injury Sacrum Mid (Active)   Wound Image   01/20/21 1357   Wound Description Clean;Dry;Fragile;Pink;Light purple 01/20/21 1357   Pressure Injury Stage DTPI 01/20/21 1357   Renuka-wound Assessment Erythema;Fragile; Purple 01/20/21 1357   Wound Length (cm) 9 cm 01/20/21 1357   Wound Width (cm) 7 cm 01/20/21 1357   Wound Surface Area (cm^2) 63 cm^2 01/20/21 1357   Drainage Amount None 01/16/21 0439   Treatments Cleansed;Site care 01/20/21 0000   Dressing Moisture barrier 01/20/21 1357   Patient Tolerance Tolerated well 01/14/21 0915   Dressing Status Clean;Dry; Intact 01/20/21 0800       Wound 01/19/21 Pressure Injury Finger (Comment which one) Right;Posterior (Active)   Wound Image   01/20/21 1359   Wound Description Dry;Clean; Intact;Fragile;Light purple 01/20/21 1359   Pressure Injury Stage DTPI 01/20/21 1359   Renuka-wound Assessment Clean;Dry; Intact; Purple 01/20/21 1359   Wound Length (cm) 2 cm 01/20/21 1359   Wound Width (cm) 2 cm 01/20/21 1359   Wound Surface Area (cm^2) 4 cm^2 01/20/21 1359   Treatments Elevated 01/20/21 1359   Dressing Open to air 01/20/21 1359

## 2021-01-20 NOTE — RESPIRATORY THERAPY NOTE
RT Ventilator Management Note  Sara Zamora 56 y o  male MRN: [id number]  Unit/Bed#: [facility] 11 Encounter: [id number]      Daily Screen       1/19/2021  0732 1/20/2021  0715          Patient safety screen outcome[de-identified]  Failed  Failed      Not Ready for Weaning due to[de-identified]  Underline problem not resolved  Underline problem not resolved              Physical Exam:   Assessment Type: Assess only  General Appearance: Sedated  Respiratory Pattern: Assisted, Tachypneic  Chest Assessment: Chest expansion symmetrical  Bilateral Breath Sounds: Coarse  R Breath Sounds: Coarse  L Breath Sounds: Coarse  Cough: Strong  Suction: ET Tube  O2 Device: ventilator      Resp Comments: Pt tolerating current vent settings and appears to be resting comfortably  No vent changes at this time  Plan to discuss vent management with critcial care team  Will continue to monitor and assess per resp protocol

## 2021-01-20 NOTE — QUICK NOTE
Updated pt's nephew Lea Khan  Discussed pt status and management plan, including mental status exam, continued sedation wean, pain management, CXR result, diuresis, and vascular surgery input  All questions were answered

## 2021-01-20 NOTE — PROGRESS NOTES
Daily Progress Note - Critical Care   Berenice Lynn 56 y o  male MRN: [id number]  Unit/Bed#: MICU 6 Encounter: [id number]        ----------------------------------------------------------------------------------------  HPI/24hr events:   56year old man with Hep C, DM2, HTN, HLP, and chronic methadone use  Found to be COVID-19 (+) 12/31  Presented for recurrent N/V at [facility]   Found to have DKA, transferred to [facility] on 1/11, treated DKA, treated for COVID on severe pathway  Had encephalopathy and increased WOB, intubated on 1/12  Aravind New found to have hematemesis/coffee grounds prior to intubation and placed on PPI, EGD not performed  Also with hematuria s/p cystoscopy and catheter placed  Cold RLE noted, vascular surgery consulted and started on UFH gtt and transferred to [facility] 1/13 for further vascular surgery evaluation  Found to have acute limb ischemia secondary to emboli from aortic thrombus s/p AKA on 1/14      Yesterday, pt was weaned off of ketamine and to propofol 35 by the afternoon, tolerated well  Plan was to remove Veras however patient still has significant amount of trauma and swelling to area secondary to complicated Veras insertion about a week ago  Discussed with nursing and agreed to hold off on Veras removal at this time  Overnight attempted wean dilaudid by 0 5 the patient did not tolerate and became agitated  Remains on Dilaudid 2  Remained intubated overnight  FiO2 was increased from 40% to 50%  Rest of vent settings remained unchanged  No other acute events  ---------------------------------------------------------------------------------------  SUBJECTIVE  This morning, pt is sedated and opens eyes to arousal but does not respond       Review of systems was unable to be performed secondary to intubated and sedated  ---------------------------------------------------------------------------------------  Assessment and Plan:    Neuro:    Diagnosis:  Toxic/metabolic encephalopathy, sedation and pain  o Patient mental status is improving  o Propofol 35, Dilaudid 2, off of ketamine drip, p r n  Versed and dilaudid  o Required 1 dose versed 1 mg PRN at 6090    o Did not tolerate attempt to wean Dilaudid overnight, can continue to attempt today  o If other parameters are met may attempt spontaneous awakening trial and spontaneous breathing trial today   o Continue IV methadone per acute pain management  o Goal RASS -2 to 0  o CAM-ICU    CV:    Diagnosis:  Aortic thrombus, history of hypertension, intermittent hypotension  o Goal MAP>65, BP range 108-128/46-52  o Levophed GTT p r n , has been off of Levophed  o Holding home BP meds  o Monitor hemodynamics, monitor tele  o On heparin drip, per vascular can DC heparin drip as patient is few days postop from AKA and they are not planning for further procedure -> consider switching to lovenox, therapeutic dose   o Vascular surgery following      Pulm:   Diagnosis:  Acute hypoxic respiratory failure secondary to COVID-19  o Intubated on 01/12 due to increased work of breathing and hypoxia  o Current vent settings AST 80%, PEEP 6, FiO2 50%  o Wean FiO2 to 40%, if tolerates switch to PSV  o If parameters met, consider SBT today   o Continue pulmonary hygiene and suctioning  o ABG 7 64/35 9/62 5   Diagnosis:  Pulmonary embolus  ? CTA 1/12 showing single small nonocclusive left lower PE  ?  On heparin gtt, to transition to other therapeutic anticoagulation today      GI:    Diagnosis:  GERD, hepatitis-C, previous signs of upper GI bleed  o No current evidence of active bleeding  o Monitor OGT and signs or symptoms of GI bleed, contact GI and EGD as necessary  o Continue Protonix b i d   o Trend CBC daily   Tube feeds and bowel regimen  o Tube feeds 56 mL/hour, now above goal per recent nutrition recommendations- recommend Glucerna 1 2 @ 52 mL/hr   o Senokot and MiraLax daily, Dulcolax suppository p r n   o last bowl movement yesterday      :  Diagnosis:  Gross a matter urea, ANNALEE-resolved, UTI, likely myoglobinuria  o No blood in urine currently  ? On urine microscopy 1/12 moderate bacteria, cx negative,   UA on 1/14 show blood, no bacteria, neg nitrite, small leukocytes  ? Received antibiotics- now on ceftriaxone 1g q24h   ? Per urology, maintain rogers catheter for at least 7 days (1/19) - did not remove yesterday given trauma and swelling to  area, will monitor and removed when patient is more awake and possibly extubated  ? Trend BMP, Cr, strict I&O's  ? UOP at goal above 100 mL/ hour        F/E/N:    Fluids: Off fluids and diuresis   Electrolytes: Trend and replete as needed   Nutrition:  Decreased tube feeds Glucerna 1 2 misa to 52 mL/hr, above goal      Heme/Onc:    Diagnosis:  Anemia  o Likely acute blood loss anemia following procedure, without other evidence of active bleeding  o Hemoglobin 7 1 from 8 1 yesterday- holding off on transfusion at this time as would not want to fluid overload patient in the setting of possible extubation  o Will repeat H&H this afternoon, if hemoglobin continues to trend down will transfuse  o Two nights ago required 1 unit packed red blood cell transfusion for hemoglobin of 6 8 following formalization procedure  o Trend daily CBC   Diagnosis: Thrombocytopenia-improved  o Predated initiation of heparin  o Trend daily CBC-platelet 771 from 337      Endo:    Diagnosis:  T2DM, resolved DKA  o Insulin gtt  o Sugars controlled 148>173>131       ID:   · Diagnosis: COVID19 infection   ? Plan: severe COVID pathway  ? Decadron 0 1 mg/kg day 9/10   ? Continue zinc, vitamin D, vitamin C, statin  ? Remdesivr not given  ? Actemra not given  ? Convalescent plasma not given  ? Ceftriaxone 1g daily day 4/7 and doxycycline 100 mg q12h day 3/6 (end 1/23)    · Diagnosis: Leukocytosis - improving   ? Trend CBC- WBC 14 61 from 19 20   ? Afebrile since 1/18, continue to monitor temps  ? Procal 0 96  ?  Blood cx 1/11 and 1/15 no growth   ? CXR on 1/15 shows stable b/l opacities without new focal consolidation  ? Chest x-ray today shows bilateral ground-glass opacities with slight improvement, possible worsening in the left lower lobe      MSK/Skin:   · Diagnosis: Acute limb ischemia, rhebdomyolysis   ? Duplex lower extremities on 1/12 with R femoral DVT, post intubation RLE found to be pale and pulseless, transferred here for vascular, CT with filling defect in descending aorta consistent with thrombus    ? POD#6 s/p RLE AKA guillotine amputation, POD#2 from formalization of AKA   ? Continue to turn and reposition frequently  ? Pressure off-loading  ? PT/OT, PM&R consulted, when pt is awake and extubated   ? Monitor UOP and Cr, CK trended down (39,664 on 1/14 to 2,573 on 1/17)  ? D-dimer 2 54 yesterday from 12 77 on 1/14   ? Per vascular, pt can come off heparin gtt as he is few days post op, will discuss transitioning to other anticoagulation, will likely need therapeutic, no contraindications to lovenox   ? Vascular will come take a look at pt's AKA stump at some point today   ? Pain management  § Acute pain management on board given chronic methadone use- started IV methadone and weaned off ketamine   § Gabapentin 100mg PO TID crushed through OGT (home med)  § Dilaudid 1 mg q1h PRN for breakthrough pain   § Discussing with anesthesiology utility of regional nerve block, could not given because on heparin gtt - hold on this for now and see how pt tolerates above interventions first, once off heparin could consider    · Diagnosis: Blistering of RUE- improving   ? Possibly IV related, does not seem infectious   ? Continue monitor for signs and symptoms of infection  ? Ultrasound of upper extremities negative for DVT or superficial thrombophlebitis  ?  Vascular aware of this finding        Disposition: Continue Critical Care   Code Status: Level 1 - Full Code  ---------------------------------------------------------------------------------------  ICU CORE MEASURES    Prophylaxis   VTE Pharmacologic Prophylaxis: Heparin  VTE Mechanical Prophylaxis: sequential compression device  Stress Ulcer Prophylaxis: Pantoprazole IV     ABCDE Protocol (if indicated)  Plan to perform spontaneous awakening trial today? Yes   Plan to perform spontaneous breathing trial today? Possibly pending FiO2 and mental status   Obvious barriers to extubation? Yes  CAM-ICU: Negative    Invasive Devices Review  Invasive Devices     Central Venous Catheter Line            CVC Central Lines 21 Triple Right 7 days          Peripheral Intravenous Line            Peripheral IV 21 Right;Upper Arm 1 day    Peripheral IV 21 Left;Proximal;Ventral (anterior) Forearm 1 day          Arterial Line            Arterial Line 21 Radial 7 days          Drain            Urethral Catheter 18 Fr  -- days    NG/OG/Enteral Tube Orogastric 16 Fr 8 days          Airway            ETT  Oral;Hi-Lo; Cuffed 7 5 mm 8 days              Can any invasive devices be discontinued today?  Possibly ETT   ---------------------------------------------------------------------------------------  OBJECTIVE    Vitals   Vitals:    21 2300 21 0000 21 0001 21 0332   BP:       BP Location:       Pulse: 92 90     Resp: 22 22     Temp: 99 3 °F (37 4 °C) 99 7 °F (37 6 °C)     TempSrc:  Esophageal     SpO2: 94% 95% 95% 96%   Weight:       Height:         Temp (24hrs), Av 3 °F (36 8 °C), Min:97 2 °F (36 2 °C), Max:99 7 °F (37 6 °C)  Current: Temperature: 99 7 °F (37 6 °C)    Respiratory:  SpO2 Device: O2 Device: Ventilator       Invasive/non-invasive ventilation settings   Respiratory    Lab Data (Last 4 hours)       0452            pH, Arterial       7 461           pCO2, Arterial       35 9           pO2, Arterial       62 5           HCO3, Arterial       25 0           Base Excess, Arterial       1 2                O2/Vent Data     None                Physical Exam  Vitals signs and nursing note reviewed  Constitutional:       Appearance: He is ill-appearing  Comments: Sedated and intubated    HENT:      Head: Normocephalic and atraumatic  Right Ear: External ear normal       Left Ear: External ear normal    Eyes:      Conjunctiva/sclera: Conjunctivae normal       Pupils: Pupils are equal, round, and reactive to light  Neck:      Comments: RIJ CVC in place without purulence or bleeding  Cardiovascular:      Rate and Rhythm: Normal rate and regular rhythm  Pulses: Normal pulses  Heart sounds: Normal heart sounds  No murmur  Pulmonary:      Effort: Pulmonary effort is normal  No respiratory distress  Breath sounds: No wheezing or rales  Comments: Mechanical breath sounds equal bilaterally  Abdominal:      General: Abdomen is flat  Bowel sounds are normal  There is no distension  Palpations: Abdomen is soft  Musculoskeletal:         General: Swelling present  Comments: RLE AKA bandaged without weeping   B/l UE edema especially hands   Skin:     General: Skin is warm and dry  Capillary Refill: Capillary refill takes less than 2 seconds  Findings: No rash        Comments: RUE blistering appearance improved    Neurological:      Comments: Sedated, opens eyes to verbal and physical stimuli but does not follow other commands, does not nod or shake head to questions but appears to be trying   Is slightly moving head and left foot spontaneously    Psychiatric:      Comments: sedated       Laboratory and Diagnostics:  Results from last 7 days   Lab Units 01/20/21  0513 01/19/21  0324 01/18/21  2137 01/18/21  1702 01/18/21  0352 01/17/21  0755 01/16/21  1819 01/16/21  0743 01/16/21  0437  01/15/21  1723  01/14/21  2359  01/13/21  1611   WBC Thousand/uL 14 61* 19 20*  --  17 84* 18 81* 10 55* 10 93* 10 66* 10 23*   < > 9 54   < > 8 46   < > 7 65 HEMOGLOBIN g/dL 7 1* 8 1* 7 7* 6 8* 8 3* 8 2* 7 9* 8 0* 8 1*   < > 6 8*   < > 7 8*   < > 9 1*   I STAT HEMOGLOBIN   --   --   --   --   --   --   --   --   --   --   --   --   --    < >  --    HEMATOCRIT % 21 7* 23 9*  --  21 3* 25 8* 24 2* 23 8* 24 1* 24 1*   < > 20 6*   < > 23 5*   < > 26 5*   HEMATOCRIT, ISTAT   --   --   --   --   --   --   --   --   --   --   --   --   --    < >  --    PLATELETS Thousands/uL 179 167  --  159 155 101* 76* 60* 61*   < > 58*   < > 40*   < > 34*   NEUTROS PCT %  --   --   --   --   --   --   --  84* 87*  --  90*  --   --   --   --    BANDS PCT %  --   --   --   --  2  --   --   --   --   --   --   --   --   --  14*   MONOS PCT %  --   --   --   --   --   --   --  10 7  --  6  --   --   --   --    MONO PCT %  --   --   --   --  1*  --   --   --   --   --   --   --  1*  --  2*    < > = values in this interval not displayed  Results from last 7 days   Lab Units 01/20/21  0529 01/19/21  0334 01/18/21  1702 01/18/21  0352 01/17/21  0645 01/17/21  0507 01/16/21  1819  01/16/21  0437   SODIUM mmol/L 138 138 140 142 143 143 145   < > 147*   POTASSIUM mmol/L 4 4 4 5 4 5 4 7 4 1 4 2 3 7   < > 3 9   CHLORIDE mmol/L 106 109* 111* 111* 113* 113* 115*   < > 119*   CO2 mmol/L 27 26 26 25 24 26 24   < > 23   ANION GAP mmol/L 5 3* 3* 6 6 4 6   < > 5   BUN mg/dL 24 25 22 18 20 21 22   < > 20   CREATININE mg/dL 0 71 0 72 0 77 0 74 0 80 0 74 0 78   < > 0 66   CALCIUM mg/dL 7 7* 7 7* 7 1* 7 6* 7 5* 7 5* 7 3*   < > 7 1*   GLUCOSE RANDOM mg/dL 138 199* 179* 188* 133 159* 143*   < > 145*   ALT U/L  --   --   --   --   --   --   --   --  82*   AST U/L  --   --   --   --   --   --   --   --  139*   ALK PHOS U/L  --   --   --   --   --   --   --   --  104   ALBUMIN g/dL  --   --   --   --   --   --   --   --  1 6*   TOTAL BILIRUBIN mg/dL  --   --   --   --   --   --   --   --  0 73    < > = values in this interval not displayed       Results from last 7 days   Lab Units 01/20/21  0529 01/19/21  0334 01/18/21  0352 01/17/21  0645 01/17/21  0507 01/16/21  1819 01/16/21  0743   MAGNESIUM mg/dL 2 0 2 0 2 1 1 8 1 8 1 8 2 0   PHOSPHORUS mg/dL 3 7 3 4 3 3 1 9* 2 2* 2 3 2 0*      Results from last 7 days   Lab Units 01/20/21  0433 01/19/21  0311 01/18/21 2038 01/18/21  1412 01/18/21  0352 01/17/21 2024 01/17/21  1418  01/14/21  2356   INR   --   --   --   --   --   --   --   --  1 44*   PTT seconds 52* 70* 66* 73* 107* 76* 67*   < > 37    < > = values in this interval not displayed  Results from last 7 days   Lab Units 01/13/21  2328 01/13/21  1205   LACTIC ACID mmol/L 1 4 1 7     ABG:  Results from last 7 days   Lab Units 01/20/21  0452 01/17/21  1204   PH ART  7 461* 7 507*   PCO2 ART mm Hg 35 9* 29 5*   PO2 ART mm Hg 62 5* 98 4   HCO3 ART mmol/L 25 0 22 9   BASE EXC ART mmol/L 1 2 0 2   ABG SOURCE   --  Line, Arterial     VBG:  Results from last 7 days   Lab Units 01/17/21  1204   ABG SOURCE  Line, Arterial     Results from last 7 days   Lab Units 01/19/21  0436 01/14/21  0408   PROCALCITONIN ng/ml 0 96* 1 13*       Micro  Results from last 7 days   Lab Units 01/15/21  0621 01/15/21  0620   BLOOD CULTURE  No Growth After 4 Days  No Growth After 4 Days  EKG: N/A  Imaging: CXR today, reviewed, pending official read- bilateral groundglass opacities similar to pervious, perhaps slightly improved, with possible worsening consolidation in LLL      Intake and Output  I/O       01/18 0701 - 01/19 0700 01/19 0701 - 01/20 0700    I V  (mL/kg) 3073 3 (37) 1253 4 (15 1)    NG/ 850    IV Piggyback 190     Feedings 1058 969    Total Intake(mL/kg) 4581 3 (55 2) 3072 4 (37)    Urine (mL/kg/hr) 4125 (2 1) 2555 (1 3)    Blood 150     Total Output 4275 2555    Net +306 3 +517 4              UOP: 106 ml/hr     Height and Weights   Height: 5' 6" (167 6 cm)  IBW: 63 8 kg  Body mass index is 29 53 kg/m²    Weight (last 2 days)     Date/Time   Weight    01/19/21 0600   83 (182 98)    01/18/21 0555   82 4 (181 66) Nutrition       Diet Orders   (From admission, onward)             Start     Ordered    01/18/21 1328  Diet Enteral/Parenteral; Tube Feeding No Oral Diet; Glucerna 1 2; Continuous; 56; 200; Water; Every 6 hours  Diet effective now     Question Answer Comment   Diet Type Enteral/Parenteral    Enteral/Parenteral Tube Feeding No Oral Diet    Tube Feeding Formula: Glucerna 1 2    Bolus/Cyclic/Continuous Continuous    Tube Feeding Goal Rate (mL/hr): 56    Tube Feeding flush (mL): 200    Water Flush type: Water    Water flush frequency: Every 6 hours    RD to adjust diet per protocol? Yes        01/18/21 1328              TF currently running at 56 ml/hr with a goal of 56 ml/hr   Formula: Glucerna 1 2      Active Medications  Scheduled Meds:  Current Facility-Administered Medications   Medication Dose Route Frequency Provider Last Rate    acetaminophen  650 mg Oral Q6H PRN Sara Rachel MD      atorvastatin  40 mg Oral HS Vijayawanabil Orourke MD      bisacodyl  10 mg Rectal Daily PRN Sara Rachel MD      cefTRIAXone  1,000 mg Intravenous Q24H Norrine Clarisse PA-C 1,000 mg (01/19/21 1452)    chlorhexidine  15 mL Swish & Spit Q12H [address] Sara Rachel MD      cholecalciferol  2,000 Units Oral Daily Sara Rachel MD      dexamethasone  0 1 mg/kg Intravenous Q12H Sara Rachel MD      doxycycline hyclate  100 mg Oral Q12H [address] Norbeto Robb PA-C      gabapentin  100 mg Oral TID Lorrie Arredondo PA-C      heparin (porcine)  3-30 Units/kg/hr (Order-Specific) Intravenous Titrated Sara Rachel MD 12 Units/kg/hr (01/19/21 2001)    heparin (porcine)  3,000 Units Intravenous Q1H PRN Sara Rachel MD      heparin (porcine)  6,000 Units Intravenous Q1H PRN Sara Rachel MD      HYDROmorphone  2 mg/hr Intravenous Continuous Sara Rachel MD 2 mg/hr (01/19/21 2001)    HYDROmorphone  1 mg Intravenous Q1H PRN Norrine Brilliant, JOSE      insulin regular (HumuLIN R,NovoLIN R) infusion  0 3-21 Units/hr Intravenous Titrated Colette Burdick MD 6 Units/hr (01/20/21 0230)    ketamine  0 5 mg/kg/hr Intravenous Continuous James [address] PA-C Stopped (01/19/21 1452)    levalbuterol  1 25 mg Nebulization Q6H Colette Burdick MD      methadone  10 [facility]ous Q8H [facility]      midazolam  1 mg Intravenous Q1H PRN Colette Burdick MD      multivitamin-minerals  1 tablet Oral Daily Colette Burdick MD      norepinephrine  1-30 mcg/min Intravenous Titrated Colette Burdick MD Stopped (01/19/21 1299)    pantoprazole  40 mg Intravenous Q12H [address] Colette Burdick MD      polyethylene glycol  17 g Oral Daily Colette Burdick MD      propofol  5-50 mcg/kg/min Intravenous Titrated Colette Burdick MD 35 mcg/kg/min (01/20/21 0529)    senna  2 tablet Oral BID Colette Burdick MD      sodium chloride  4 mL Nebulization Q6H Libby Hernandez MD       Continuous Infusions:  heparin (porcine), 3-30 Units/kg/hr (Order-Specific), Last Rate: 12 Units/kg/hr (01/19/21 2001)  HYDROmorphone, 2 mg/hr, Last Rate: 2 mg/hr (01/19/21 2001)  insulin regular (HumuLIN R,NovoLIN R) infusion, 0 3-21 Units/hr, Last Rate: 6 Units/hr (01/20/21 0230)  ketamine, 0 5 mg/kg/hr, Last Rate: Stopped (01/19/21 1452)  norepinephrine, 1-30 mcg/min, Last Rate: Stopped (01/19/21 0953)  propofol, 5-50 mcg/kg/min, Last Rate: 35 mcg/kg/min (01/20/21 0529)      PRN Meds:   acetaminophen, 650 mg, Q6H PRN  bisacodyl, 10 mg, Daily PRN  heparin (porcine), 3,000 Units, Q1H PRN  heparin (porcine), 6,000 Units, Q1H PRN  HYDROmorphone, 1 mg, Q1H PRN  midazolam, 1 mg, Q1H PRN        Allergies   Allergies   Allergen Reactions    Varenicline      ---------------------------------------------------------------------------------------  Advance Directive and Living Will:      Power of :    POLST: ---------------------------------------------------------------------------------------    Tin Cutler MD      Portions of the record may have been created with voice recognition software  Occasional wrong word or "sound a like" substitutions may have occurred due to the inherent limitations of voice recognition software    Read the chart carefully and recognize, using context, where substitutions have occurred

## 2021-01-20 NOTE — QUICK NOTE
- R AKA dressing taken down and stump inspected  - wound edges well apposed with no palpable areas of fluctuance or expressible drainage   - mild ecchymosis noted on the superior and medial aspect of the skin overlying the incision   - wound dressing change done with adaptic/ABD/kerlix and ace wraps

## 2021-01-20 NOTE — PROGRESS NOTES
Progress Note - Acute Pain Service    Radha Mccurdy 56 y o  male MRN: [id number]  Unit/Bed#: MICU 11 Encounter: [id number]      Assessment:   Principal Problem:    Pneumonia due to COVID-19 virus  Active Problems:    Diabetic ketoacidosis without coma associated with type 2 diabetes mellitus ([facility] [address] )    Acute metabolic encephalopathy    Acute kidney injury ([facility] )    Acute respiratory failure with hypoxia (HCC)    Sepsis due to COVID-19 (HCC)    Hypernatremia    Metabolic acidosis, increased anion gap    Aortic thrombus ([facility] [address] )    Ben Alvarez is a 56 y  o  male with past medical history significant for hypertension, hyperlipidemia, GERD, hepatitis-C, diabetes mellitus, opioid use disorder chronically on methadone 70 mg daily who initially presented to [facility] with nausea and vomiting was had complicated hospitalization including DKA which has been treated but remains on insulin infusion, COVID leading to acute hypoxic respiratory failure requiring intubation, encephalopathy, elevation in transaminases, acute kidney injury which has resolved, pulmonary embolism, hematemesis, hematuria status post cystoscopy and catheter placement   Patient also noted to have ischemic right lower extremity for which vascular surgery was consulted who started patient on heparin infusion it was transferred to [address] [place] [address] is now status post AKA on 01/14/2020   Acute limb ischemia likely in the setting of aortic thrombus  IV methadone was started in lieu of home oral methadone  Patient underwent formalization of RLE AKA on 01/18/2021  Currently on propofol and hydromorphone infusions  Ketamine d/c'd 01/19/2021   On heparin infusion      Plan:   - sedation per primary team with propofol  - continue methadone 10 mg IV tid scheduled (home dose 70 mg po)  - attempt to decrease hydromorphone infusion to 1 mg/hr as tolerated  - continue hydromorphone 1 mg IV q 1 hours p r n  for breakthrough pain  - patient is on dexamethasone per primary team  - bowel regimen with senna amrit, bisacodyl prn , polyethylene glycol amrit     APS will continue to follow  Please call  / [phone] or [facility] (/ between [phone] and on weekends) with questions or concerns    Pain History  Current pain location(s): intubated  Pain Scale:   intubated  Quality: intubated  24 hour history:  Patient examined at doorway  Ketamine discontinued and propofol decreased  No pressors currently  Patient did not tolerate weaning IV hydromorphone per primary team   Required slight increase in FiO2 on ventilator  Per resident, BM yesterday  Unable to obtain review of systems due to intubation      Opioid requirement previous 24 hours:   Methadone 30 mg IV  Hydromorphone 48 mg IV infusion    Meds/Allergies   all current active meds have been reviewed and current meds:   Current Facility-Administered Medications   Medication Dose Route Frequency    acetaminophen (TYLENOL) tablet 650 mg  650 mg Oral Q6H PRN    atorvastatin (LIPITOR) tablet 40 mg  40 mg Oral HS    bisacodyl (DULCOLAX) rectal suppository 10 mg  10 mg Rectal Daily PRN    cefTRIAXone (ROCEPHIN) 1,000 mg in dextrose 5 % 50 mL IVPB  1,000 mg Intravenous Q24H    chlorhexidine (PERIDEX) 0 12 % oral rinse 15 mL  15 mL Swish & Spit Q12H [address]    cholecalciferol (VITAMIN D3) tablet 2,000 Units  2,000 Units Oral Daily    dexamethasone (PF) (DECADRON) injection 7 3 mg  0 1 mg/kg Intravenous Q12H    doxycycline hyclate (VIBRAMYCIN) capsule 100 mg  100 mg Oral Q12H AMRIT    gabapentin (NEURONTIN) capsule 100 mg  100 mg Oral TID    heparin (porcine) 25,000 units in 0 45% NaCl 250 mL infusion (premix)  3-30 Units/kg/hr (Order-Specific) Intravenous Titrated    heparin (porcine) injection 3,000 Units  3,000 Units Intravenous Q1H PRN    heparin (porcine) injection 6,000 Units  6,000 Units Intravenous Q1H PRN    HYDROmorphone (DILAUDID) 50 mg in sodium chloride 0 9% 50mL drip  2 mg/hr Intravenous Continuous    HYDROmorphone (DILAUDID) injection 1 mg  1 mg Intravenous Q1H PRN    insulin regular (HumuLIN R,NovoLIN R) 1 Units/mL in sodium chloride 0 9 % 100 mL infusion  0 3-21 Units/hr Intravenous Titrated    ketamine 500 mg (DOUBLE CONCENTRATION) IV in sodium chloride 0 9% 250 mL  0 5 mg/kg/hr Intravenous Continuous    levalbuterol (XOPENEX) inhalation solution 1 25 mg  1 25 mg Nebulization Q6H    methadone (DOLOPHINE) injection 10 mg  10 mg Intravenous Q8H [facility]    midazolam (VERSED) injection 1 mg  1 mg Intravenous Q1H PRN    multivitamin-minerals (CENTRUM ADULTS) tablet 1 tablet  1 tablet Oral Daily    norepinephrine (LEVOPHED) 4 mg (STANDARD CONCENTRATION) IV in sodium chloride 0 9% 250 mL  1-30 mcg/min Intravenous Titrated    pantoprazole (PROTONIX) injection 40 mg  40 mg Intravenous Q12H [facility]    polyethylene glycol (MIRALAX) packet 17 g  17 g Oral Daily    propofol (DIPRIVAN) 1000 mg in 100 mL infusion (premix)  5-50 mcg/kg/min Intravenous Titrated    senna (SENOKOT) tablet 17 2 mg  2 tablet Oral BID    sodium chloride 3 % inhalation solution 4 mL  4 mL Nebulization Q6H       Allergies   Allergen Reactions    Varenicline        Objective     Temp:  [97 2 °F (36 2 °C)-100 4 °F (38 °C)] 100 4 °F (38 °C)  HR:  [] 94  Resp:  [16-43] 34  Arterial Line BP: (104-134)/(46-54) 118/48  FiO2 (%):  [40-55] 40    Physical Exam  Vitals signs and nursing note reviewed  Constitutional:       Comments: PE performed at doorway   HENT:      Head: Normocephalic and atraumatic  Cardiovascular:      Rate and Rhythm: Normal rate     Pulmonary:      Comments: intubated  Abdominal:      Comments: NGT   Genitourinary:     Comments: Eda  Neurological:      Comments: Sedated         Lab Results:   Results from last 7 days   Lab Units 01/20/21  0513   WBC Thousand/uL 14 61*   HEMOGLOBIN g/dL 7 1*   HEMATOCRIT % 21 7*   PLATELETS Thousands/uL 179      Results from last 7 days   Lab Units 01/20/21  0529 01/16/21  0437  01/13/21  2202   POTASSIUM mmol/L 4 4   < > 3 9   < >  --    CHLORIDE mmol/L 106   < > 119*   < >  --    CO2 mmol/L 27   < > 23   < >  --    CO2, I-STAT mmol/L  --   --   --   --  23   BUN mg/dL 24   < > 20   < >  --    CREATININE mg/dL 0 71   < > 0 66   < >  --    CALCIUM mg/dL 7 7*   < > 7 1*   < >  --    ALK PHOS U/L  --   --  104  --   --    ALT U/L  --   --  82*  --   --    AST U/L  --   --  139*  --   --    GLUCOSE, ISTAT mg/dl  --   --   --   --  179*    < > = values in this interval not displayed  Imaging Studies: I have personally reviewed pertinent reports  EKG, Pathology, and Other Studies: I have personally reviewed pertinent reports  Please note that the APS provides consultative services regarding pain management only  With the exception of ketamine and epidural infusions and except when indicated, final decisions regarding starting or changing doses of analgesic medications are at the discretion of the consulting service  Off hours consultation and/or medication management is generally not available      Sidney Carrel, MD  Acute Pain Service

## 2021-01-20 NOTE — CONSULTS
PHYSICAL MEDICINE AND REHABILITATION CONSULT NOTE  Vernon Louis 56 y o  male MRN: [id number]  Unit/Bed#: MICU 11 Encounter: [id number]    Requested by (Physician/Service): Lord Charlotte DO  Reason for Consultation:  Assessment of rehabilitation needs    Assessment:  Rehabilitation Diagnosis:   · Right Transfemoral amputation  · Pneumonia due to COVID-19   Impaired mobility and self care   Impaired cognition     Recommendations:  Rehabilitation Plan:   Continue PT/OT (SLP) while on acute care   Mr Letha Mcelroy is currently intubated and sedated with a recent right sided transfemoral amputation  Originally treated for DKA, but developed aortic thrombus leading to vascular compromise of the right leg  He still remains very ill and we will follow him closely as he moves to the next level of care within the hospital  It is too early to tell his needs, but he certainly has the medical needs and potential for rehabilitation when the time is appropriate to make that decision   Last BM 1/12, on miralax daily, senna 2 tabs daily, PRN bisacodyl suppository  Received last on 1/18  Would recommend adding colace and considering additional suppository vs enema  Consider KUB for stool burden   Position right TFA so that it is as close to neutral as possible, high risk for hip flexion contracture which will limit his use of a prosthesis in the future  Wrap, local care, and edema control for residual limb fitting  Will address pre and post prosthesis training with patient when appropriate   Covid-19 Testing: [facility] rehabilitation units require testing within 48 hours of potential admission for all general admissions  Please re-test if needed  *Re-testing is NOT required for patients recovering from COVID-19 infection if isolation has been discontinued per CDC criteria            Medical Co-morbidities Plan:  Right Transfemoral amputation  Pneumonia due to COVID-19 virus  Sepsis  Hypernatremia  DKA  Metabolic acidosis  Aortic thrombus leading to need for transfemoral amputation  Acute kidney injury  transaminitis  Pulmonary embolism with hematemesis  Hematuria post cystoscopy  Acute respiratory failure with hypoxia  Anemia Hgb 7 0  Sacral wound, DTI  Right 2nd finger DTI    Bowel plan: Last BM 1/12, on miralax daily, senna 2 tabs daily, PRN bisacodyl suppository  Received last on 1/18  Would recommend adding colace and considering additional suppository vs enema  Consider KUB for stool burden  Bladder plan: rogers catheter  DVT ppx: lovenox 80mg Q12H for Pulmonary embolism    Skin care plans per wound care:  1-Hydraguard to bilateral sacrum, buttock BID and PRN  2-Elevate heels to offload pressure  3-Ehob cushion in chair when out of bed  4-Moisturize skin daily with skin nourishing cream   5-Turn/reposition q2h or when medically stable for pressure re-distribution on skin    6-Allevyn foam to heel, juan w/P, peel foam check skin integrity q-shift  Change q3d  7-Leave right index finger wound open to air     Thank you for this consultation  Do not hesitate to contact service with further questions  Florentin Vela DO  PM&R    History of Present Illness:  Dianna Stacy a 56 y  o  male with past medical history significant for hypertension, hyperlipidemia, GERD, hepatitis-C, diabetes mellitus, opioid use disorder chronically on methadone 70 mg daily who initially presented to [facility] with nausea and vomiting was had complicated hospitalization including DKA which has been treated but remains on insulin infusion, COVID leading to acute hypoxic respiratory failure requiring intubation, encephalopathy, elevation in transaminases, acute kidney injury which has resolved, pulmonary embolism, hematemesis, hematuria status post cystoscopy and catheter placement   Patient also noted to have ischemic right lower extremity for which vascular surgery was consulted who started patient on heparin infusion it was transferred to [facility] is now status post AKA on 01/14/2020   Acute limb ischemia likely in the setting of aortic thrombus   IV methadone was started in lieu of home oral methadone   Patient underwent formalization of RLE transfemoral amputation on 01/18/2021  We are asked for rehab recommendations  Review of Systems: 10 point ROS negative except for what is noted in HPI    Function:  Prior level of function and living situation:  Lives with his nephew, brothers, and sisters  All family lives together pretty much  3-4 FRANDY with railing and threshold to enter front door  Only bathroom is on the second floor next to his bedroom  Family is willing to help out and set him up on the first floor  Current level of function:  Physical Therapy: pending post op evaluations  Occupational Therapy: pending post op evaluations       Physical Exam:  /50   Pulse 102   Temp 100 °F (37 8 °C)   Resp (!) 27   Ht 5' 6" (1 676 m)   Wt 83 1 kg (183 lb 3 2 oz)   SpO2 95%   BMI 29 57 kg/m²        Intake/Output Summary (Last 24 hours) at 1/20/2021 1627  Last data filed at 1/20/2021 1600  Gross per 24 hour   Intake 2960 79 ml   Output 4090 ml   Net -1129 21 ml       Body mass index is 29 57 kg/m²  Limited physical exam due to medical status  Observed  Will have detailed exam when out of the MICU      Physical Exam  Constitutional:       Comments: Intubated and sedated   HENT:      Mouth/Throat:      Comments: ET Tube  Pulmonary:      Comments: Chest rise and fall with ventilator  Musculoskeletal:      Comments: Right transfemoral amputation   Neurological:      Comments: Unable to assess due to sedation   Psychiatric:      Comments: Unable to assess due to sedation         Social History:    Social History     Socioeconomic History    Marital status:      Spouse name: Not on file    Number of children: Not on file    Years of education: Not on file    Highest education level: Not on file   Occupational History    Not on file   Social Needs    Financial resource strain: Not on file    Food insecurity     Worry: Not on file     Inability: Not on file    Transportation needs     Medical: Not on file     Non-medical: Not on file   Tobacco Use    Smoking status: Former Smoker    Smokeless tobacco: Never Used   Substance and Sexual Activity    Alcohol use: No    Drug use: No     Comment: methadone    Sexual activity: Not on file   Lifestyle    Physical activity     Days per week: Not on file     Minutes per session: Not on file    Stress: Not on file   Relationships    Social connections     Talks on phone: Not on file     Gets together: Not on file     Attends [employer] service: Not on file     Active member of club or organization: Not on file     Attends meetings of clubs or organizations: Not on file     Relationship status: Not on file    Intimate partner violence     Fear of current or ex partner: Not on file     Emotionally abused: Not on file     Physically abused: Not on file     Forced sexual activity: Not on file   Other Topics Concern    Not on file   Social History Narrative    Not on file        Family History:    Family History   Problem Relation Age of Onset    Diabetes Mother     Hypertension Father     Leukemia Father     Acute lymphoblastic leukemia Father     Cancer Sister          Medications:     Current Facility-Administered Medications:     acetaminophen (TYLENOL) tablet 650 mg, 650 mg, Oral, Q6H PRN, Colette Burdick MD, 650 mg at 01/18/21 0350    atorvastatin (LIPITOR) tablet 40 mg, 40 mg, Oral, HS, Colette Burdick MD, 40 mg at 01/19/21 2156    bisacodyl (DULCOLAX) rectal suppository 10 mg, 10 mg, Rectal, Daily PRN, Colette Burdick MD, 10 mg at 01/18/21 1704    cefTRIAXone (ROCEPHIN) 1,000 mg in dextrose 5 % 50 mL IVPB, 1,000 mg, Intravenous, Q24H, James Lane PA-C, Last Rate: 100 mL/hr at 01/20/21 1359, 1,000 mg at 01/20/21 1359    chlorhexidine (PERIDEX) 0 12 % oral rinse 15 mL, 15 mL, Swish & Anshu, Q12H [address], Asad Driscoll MD, 15 mL at 01/20/21 0807    cholecalciferol (VITAMIN D3) tablet 2,000 Units, 2,000 Units, Oral, Daily, Asad Driscoll MD, 2,000 Units at 01/20/21 0808    dexamethasone (PF) (DECADRON) injection 7 3 mg, 0 1 mg/kg, Intravenous, Q12H, Asad Driscoll MD, 7 3 mg at 01/20/21 0757    doxycycline hyclate (VIBRAMYCIN) capsule 100 mg, 100 mg, Oral, Q12H [address], Rajwinder Mcgraw PA-C, 100 mg at 01/20/21 2040    enoxaparin (LOVENOX) subcutaneous injection 80 mg, 1 mg/kg, Subcutaneous, Q12H [address], VERONICA Draper, 80 mg at 01/20/21 1546    gabapentin (NEURONTIN) capsule 200 mg, 200 mg, Oral, TID, Aries Echevarria MD, 200 mg at 01/20/21 1547    HYDROmorphone (DILAUDID) 50 mg in sodium chloride 0 9% 50mL drip, 1 75 mg/hr, Intravenous, Continuous, VERONICA Draper, Last Rate: 1 75 mL/hr at 01/20/21 1336, 1 75 mg/hr at 01/20/21 1336    HYDROmorphone (DILAUDID) injection 1 mg, 1 mg, Intravenous, Q1H PRN, Rajwinder Mcgraw PA-C    insulin regular (HumuLIN R,NovoLIN R) 1 Units/mL in sodium chloride 0 9 % 100 mL infusion, 0 3-21 Units/hr, Intravenous, Titrated, Libby Hernandez MD, Last Rate: 4 mL/hr at 01/20/21 1426, 4 Units/hr at 01/20/21 1426    levalbuterol (XOPENEX) inhalation solution 1 25 mg, 1 25 mg, Nebulization, Q6H, Libby Hernandez MD, 1 25 mg at 01/20/21 1337    methadone (DOLOPHINE) injection 10 mg, 10 mg, Intravenous, Q8H [address], Tan Trevino PA-C, 10 mg at 01/20/21 1409    midazolam (VERSED) injection 1 mg, 1 mg, Intravenous, Q1H PRN, Asad Driscoll MD, 1 mg at 01/20/21 0433    [COMPLETED] zinc sulfate (ZINCATE) capsule 220 mg, 220 mg, Oral, Daily, 220 mg at 01/18/21 0800 **FOLLOWED BY** multivitamin-minerals (CENTRUM ADULTS) tablet 1 tablet, 1 tablet, Oral, Daily, Asad Driscoll MD, 1 tablet at 01/20/21 0808    pantoprazole (PROTONIX) injection 40 mg, 40 mg, Intravenous, Q12H [address], Libby Heron Adam MD, 40 mg at 01/20/21 0808    polyethylene glycol (MIRALAX) packet 17 g, 17 g, Oral, Daily, Radha Estevez MD, 17 g at 01/20/21 0807    propofol (DIPRIVAN) 1000 mg in 100 mL infusion (premix), 5-50 mcg/kg/min, Intravenous, Titrated, Libby Hernandez MD, Last Rate: 23 mL/hr at 01/20/21 1524, 45 mcg/kg/min at 01/20/21 1524    senna (SENOKOT) tablet 17 2 mg, 2 tablet, Oral, BID, Radha Estevez MD, 17 2 mg at 01/20/21 0808    sodium chloride 3 % inhalation solution 4 mL, 4 mL, Nebulization, Q6H, Libby Hernandez MD, 4 mL at 01/20/21 1337    Past Medical History:     Past Medical History:   Diagnosis Date    Diabetes mellitus ([facility] )     GERD (gastroesophageal reflux disease)     Hypercholesteremia     Hypertension     Methadone use ([facility] )         Past Surgical History:     Past Surgical History:   Procedure Laterality Date    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    Procedure: AMPUTATION ABOVE KNEE (AKA); Surgeon: Leanna Núñez MD;  Location: [facility];  Service: Vascular    AMPUTATION ABOVE KNEE (AKA) Right 1/18/2021    Procedure: AMPUTATION ABOVE KNEE (AKA) FORMALIZATION,  R AKA wound washout, wound closure;  Surgeon: Leanna Núñez MD;  [facility]: [facility];  Service: Vascular    ARTERIOGRAM Right 1/13/2021    Procedure: ARTERIOGRAM;  Surgeon: Sofia German DO;  [facility]: [facility];  Service: Vascular    THROMBECTOMY W/ EMBOLECTOMY Right 1/13/2021    Procedure: EMBOLECTOMY/THROMBECTOMY LOWER EXTREMITY;  Surgeon: Sofia German DO;  Location: [facility];  Service: Vascular         Allergies:      Allergies   Allergen Reactions    Varenicline            LABORATORY RESULTS:      Lab Results   Component Value Date    HGB 7 0 (L) 01/20/2021    HCT 21 3 (L) 01/20/2021    WBC 14 61 (H) 01/20/2021     Lab Results   Component Value Date    BUN 24 01/20/2021    K 4 4 01/20/2021     01/20/2021    GLUCOSE 179 (H) 01/13/2021    CREATININE 0 71 01/20/2021     Lab Results   Component Value Date    PROTIME 17 5 (H) 01/14/2021    INR 1 44 (H) 01/14/2021        DIAGNOSTIC STUDIES: Reviewed  Xr Chest Portable    Result Date: 1/13/2021  Impression: 1  Interim slight improvement in bilateral interstitial and alveolar groundglass opacification is noted consistent with Covid 19 pneumonia with possible interim improvement of superimposed edema  2   Lines and tubes as noted  Workstation performed: [id number]     Xr Chest Portable Icu    Result Date: 1/15/2021  Impression: 1  Stable opacities 2    Stable lines and tubes Workstation performed: [id number]

## 2021-01-21 LAB
ABO GROUP BLD BPU: NORMAL
ALBUMIN SERPL BCP-MCNC: 1.4 G/DL (ref 3.5–5)
ALP SERPL-CCNC: 162 U/L (ref 46–116)
ALT SERPL W P-5'-P-CCNC: 46 U/L (ref 12–78)
ANION GAP SERPL CALCULATED.3IONS-SCNC: 6 MMOL/L (ref 4–13)
AST SERPL W P-5'-P-CCNC: 59 U/L (ref 5–45)
BASOPHILS # BLD MANUAL: 0.17 THOUSAND/UL (ref 0–0.1)
BASOPHILS NFR MAR MANUAL: 1 % (ref 0–1)
BILIRUB DIRECT SERPL-MCNC: 0.3 MG/DL (ref 0–0.2)
BILIRUB SERPL-MCNC: 0.66 MG/DL (ref 0.2–1)
BPU ID: NORMAL
BUN SERPL-MCNC: 25 MG/DL (ref 5–25)
CA-I BLD-SCNC: 0.99 MMOL/L (ref 1.12–1.32)
CALCIUM SERPL-MCNC: 7.7 MG/DL (ref 8.3–10.1)
CHLORIDE SERPL-SCNC: 103 MMOL/L (ref 100–108)
CO2 SERPL-SCNC: 27 MMOL/L (ref 21–32)
CREAT SERPL-MCNC: 0.68 MG/DL (ref 0.6–1.3)
CROSSMATCH: NORMAL
EOSINOPHIL # BLD MANUAL: 0 THOUSAND/UL (ref 0–0.4)
EOSINOPHIL NFR BLD MANUAL: 0 % (ref 0–6)
ERYTHROCYTE [DISTWIDTH] IN BLOOD BY AUTOMATED COUNT: 15.4 % (ref 11.6–15.1)
GFR SERPL CREATININE-BSD FRML MDRD: 102 ML/MIN/1.73SQ M
GLUCOSE SERPL-MCNC: 120 MG/DL (ref 65–140)
GLUCOSE SERPL-MCNC: 136 MG/DL (ref 65–140)
GLUCOSE SERPL-MCNC: 146 MG/DL (ref 65–140)
GLUCOSE SERPL-MCNC: 154 MG/DL (ref 65–140)
GLUCOSE SERPL-MCNC: 158 MG/DL (ref 65–140)
GLUCOSE SERPL-MCNC: 161 MG/DL (ref 65–140)
GLUCOSE SERPL-MCNC: 176 MG/DL (ref 65–140)
GLUCOSE SERPL-MCNC: 178 MG/DL (ref 65–140)
GLUCOSE SERPL-MCNC: 182 MG/DL (ref 65–140)
GLUCOSE SERPL-MCNC: 183 MG/DL (ref 65–140)
GLUCOSE SERPL-MCNC: 185 MG/DL (ref 65–140)
GLUCOSE SERPL-MCNC: 196 MG/DL (ref 65–140)
GLUCOSE SERPL-MCNC: 216 MG/DL (ref 65–140)
HCT VFR BLD AUTO: 25.7 % (ref 36.5–49.3)
HCT VFR BLD AUTO: 26.1 % (ref 36.5–49.3)
HGB BLD-MCNC: 8.6 G/DL (ref 12–17)
HGB BLD-MCNC: 8.7 G/DL (ref 12–17)
LYMPHOCYTES # BLD AUTO: 0.34 THOUSAND/UL (ref 0.6–4.47)
LYMPHOCYTES # BLD AUTO: 2 % (ref 14–44)
MCH RBC QN AUTO: 29.8 PG (ref 26.8–34.3)
MCHC RBC AUTO-ENTMCNC: 33 G/DL (ref 31.4–37.4)
MCV RBC AUTO: 90 FL (ref 82–98)
MONOCYTES # BLD AUTO: 0.17 THOUSAND/UL (ref 0–1.22)
MONOCYTES NFR BLD: 1 % (ref 4–12)
NEUTROPHILS # BLD MANUAL: 16.22 THOUSAND/UL (ref 1.85–7.62)
NEUTS BAND NFR BLD MANUAL: 3 % (ref 0–8)
NEUTS SEG NFR BLD AUTO: 92 % (ref 43–75)
NRBC BLD AUTO-RTO: 1 /100 WBCS
PLATELET # BLD AUTO: 227 THOUSANDS/UL (ref 149–390)
PLATELET BLD QL SMEAR: ADEQUATE
PMV BLD AUTO: 11.6 FL (ref 8.9–12.7)
POLYCHROMASIA BLD QL SMEAR: PRESENT
POTASSIUM SERPL-SCNC: 4 MMOL/L (ref 3.5–5.3)
PROCALCITONIN SERPL-MCNC: 0.82 NG/ML
PROT SERPL-MCNC: 6.4 G/DL (ref 6.4–8.2)
RBC # BLD AUTO: 2.89 MILLION/UL (ref 3.88–5.62)
RBC MORPH BLD: PRESENT
SODIUM SERPL-SCNC: 136 MMOL/L (ref 136–145)
TRIGL SERPL-MCNC: 210 MG/DL
UNIT DISPENSE STATUS: NORMAL
UNIT PRODUCT CODE: NORMAL
UNIT RH: NORMAL
VARIANT LYMPHS # BLD AUTO: 1 %
WBC # BLD AUTO: 17.07 THOUSAND/UL (ref 4.31–10.16)

## 2021-01-21 PROCEDURE — 85027 COMPLETE CBC AUTOMATED: CPT | Performed by: FAMILY MEDICINE

## 2021-01-21 PROCEDURE — 87040 BLOOD CULTURE FOR BACTERIA: CPT | Performed by: NURSE PRACTITIONER

## 2021-01-21 PROCEDURE — 80048 BASIC METABOLIC PNL TOTAL CA: CPT | Performed by: FAMILY MEDICINE

## 2021-01-21 PROCEDURE — 80076 HEPATIC FUNCTION PANEL: CPT | Performed by: NURSE PRACTITIONER

## 2021-01-21 PROCEDURE — C9113 INJ PANTOPRAZOLE SODIUM, VIA: HCPCS | Performed by: SURGERY

## 2021-01-21 PROCEDURE — 99291 CRITICAL CARE FIRST HOUR: CPT | Performed by: INTERNAL MEDICINE

## 2021-01-21 PROCEDURE — 87070 CULTURE OTHR SPECIMN AEROBIC: CPT | Performed by: NURSE PRACTITIONER

## 2021-01-21 PROCEDURE — 94003 VENT MGMT INPAT SUBQ DAY: CPT

## 2021-01-21 PROCEDURE — 84478 ASSAY OF TRIGLYCERIDES: CPT | Performed by: NURSE PRACTITIONER

## 2021-01-21 PROCEDURE — 85018 HEMOGLOBIN: CPT | Performed by: EMERGENCY MEDICINE

## 2021-01-21 PROCEDURE — 82330 ASSAY OF CALCIUM: CPT | Performed by: PHYSICIAN ASSISTANT

## 2021-01-21 PROCEDURE — 87205 SMEAR GRAM STAIN: CPT | Performed by: NURSE PRACTITIONER

## 2021-01-21 PROCEDURE — 94640 AIRWAY INHALATION TREATMENT: CPT

## 2021-01-21 PROCEDURE — 94760 N-INVAS EAR/PLS OXIMETRY 1: CPT

## 2021-01-21 PROCEDURE — 85014 HEMATOCRIT: CPT | Performed by: EMERGENCY MEDICINE

## 2021-01-21 PROCEDURE — 85007 BL SMEAR W/DIFF WBC COUNT: CPT | Performed by: FAMILY MEDICINE

## 2021-01-21 PROCEDURE — 84145 PROCALCITONIN (PCT): CPT | Performed by: NURSE PRACTITIONER

## 2021-01-21 PROCEDURE — 82948 REAGENT STRIP/BLOOD GLUCOSE: CPT

## 2021-01-21 RX ORDER — NOREPINEPHRINE BITARTRATE 1 MG/ML
INJECTION, SOLUTION INTRAVENOUS
Status: COMPLETED
Start: 2021-01-21 | End: 2021-01-21

## 2021-01-21 RX ORDER — FENTANYL CITRATE 50 UG/ML
50 INJECTION, SOLUTION INTRAMUSCULAR; INTRAVENOUS ONCE
Status: COMPLETED | OUTPATIENT
Start: 2021-01-21 | End: 2021-01-21

## 2021-01-21 RX ORDER — CALCIUM GLUCONATE 20 MG/ML
1 INJECTION, SOLUTION INTRAVENOUS ONCE
Status: COMPLETED | OUTPATIENT
Start: 2021-01-21 | End: 2021-01-21

## 2021-01-21 RX ORDER — FENTANYL CITRATE 50 UG/ML
INJECTION, SOLUTION INTRAMUSCULAR; INTRAVENOUS
Status: COMPLETED
Start: 2021-01-21 | End: 2021-01-21

## 2021-01-21 RX ADMIN — FENTANYL CITRATE 50 MCG: 50 INJECTION, SOLUTION INTRAMUSCULAR; INTRAVENOUS at 23:32

## 2021-01-21 RX ADMIN — PROPOFOL 30 MCG/KG/MIN: 10 INJECTION, EMULSION INTRAVENOUS at 04:47

## 2021-01-21 RX ADMIN — PROPOFOL 30 MCG/KG/MIN: 10 INJECTION, EMULSION INTRAVENOUS at 10:05

## 2021-01-21 RX ADMIN — CALCIUM GLUCONATE 1 G: 20 INJECTION, SOLUTION INTRAVENOUS at 08:24

## 2021-01-21 RX ADMIN — MIDAZOLAM 1 MG: 1 INJECTION INTRAMUSCULAR; INTRAVENOUS at 23:44

## 2021-01-21 RX ADMIN — GABAPENTIN 200 MG: 100 CAPSULE ORAL at 16:23

## 2021-01-21 RX ADMIN — SENNOSIDES 17.2 MG: 8.6 TABLET, FILM COATED ORAL at 18:25

## 2021-01-21 RX ADMIN — SODIUM CHLORIDE 1 MCG/KG/HR: 9 INJECTION, SOLUTION INTRAVENOUS at 23:47

## 2021-01-21 RX ADMIN — NOREPINEPHRINE BITARTRATE 2 MCG/MIN: 1 INJECTION, SOLUTION, CONCENTRATE INTRAVENOUS at 04:47

## 2021-01-21 RX ADMIN — LEVALBUTEROL HYDROCHLORIDE 1.25 MG: 1.25 SOLUTION, CONCENTRATE RESPIRATORY (INHALATION) at 02:33

## 2021-01-21 RX ADMIN — SODIUM CHLORIDE SOLN NEBU 3% 4 ML: 3 NEBU SOLN at 13:11

## 2021-01-21 RX ADMIN — CHLORHEXIDINE GLUCONATE 0.12% ORAL RINSE 15 ML: 1.2 LIQUID ORAL at 08:23

## 2021-01-21 RX ADMIN — FENTANYL CITRATE 50 MCG: 50 INJECTION INTRAMUSCULAR; INTRAVENOUS at 23:32

## 2021-01-21 RX ADMIN — PANTOPRAZOLE SODIUM 40 MG: 40 INJECTION, POWDER, FOR SOLUTION INTRAVENOUS at 08:24

## 2021-01-21 RX ADMIN — SENNOSIDES 17.2 MG: 8.6 TABLET, FILM COATED ORAL at 08:24

## 2021-01-21 RX ADMIN — METHADONE HYDROCHLORIDE 10 MG: 10 INJECTION, SOLUTION INTRAMUSCULAR; INTRAVENOUS; SUBCUTANEOUS at 13:21

## 2021-01-21 RX ADMIN — SODIUM CHLORIDE SOLN NEBU 3% 4 ML: 3 NEBU SOLN at 19:58

## 2021-01-21 RX ADMIN — METHADONE HYDROCHLORIDE 10 MG: 10 INJECTION, SOLUTION INTRAMUSCULAR; INTRAVENOUS; SUBCUTANEOUS at 05:20

## 2021-01-21 RX ADMIN — MIDAZOLAM 1 MG: 1 INJECTION INTRAMUSCULAR; INTRAVENOUS at 23:09

## 2021-01-21 RX ADMIN — ATORVASTATIN CALCIUM 40 MG: 40 TABLET, FILM COATED ORAL at 22:48

## 2021-01-21 RX ADMIN — ENOXAPARIN SODIUM 80 MG: 80 INJECTION SUBCUTANEOUS at 08:24

## 2021-01-21 RX ADMIN — HYDROMORPHONE HYDROCHLORIDE 1 MG: 1 INJECTION, SOLUTION INTRAMUSCULAR; INTRAVENOUS; SUBCUTANEOUS at 10:20

## 2021-01-21 RX ADMIN — MIDAZOLAM 1 MG: 1 INJECTION INTRAMUSCULAR; INTRAVENOUS at 18:56

## 2021-01-21 RX ADMIN — METHADONE HYDROCHLORIDE 10 MG: 10 INJECTION, SOLUTION INTRAMUSCULAR; INTRAVENOUS; SUBCUTANEOUS at 22:49

## 2021-01-21 RX ADMIN — MIDAZOLAM 1 MG: 1 INJECTION INTRAMUSCULAR; INTRAVENOUS at 04:10

## 2021-01-21 RX ADMIN — GABAPENTIN 200 MG: 100 CAPSULE ORAL at 22:00

## 2021-01-21 RX ADMIN — CEFEPIME HYDROCHLORIDE 2000 MG: 2 INJECTION, POWDER, FOR SOLUTION INTRAVENOUS at 22:00

## 2021-01-21 RX ADMIN — LEVALBUTEROL HYDROCHLORIDE 1.25 MG: 1.25 SOLUTION, CONCENTRATE RESPIRATORY (INHALATION) at 13:11

## 2021-01-21 RX ADMIN — Medication 2000 UNITS: at 08:24

## 2021-01-21 RX ADMIN — DEXAMETHASONE SODIUM PHOSPHATE 7.3 MG: 10 INJECTION, SOLUTION INTRAMUSCULAR; INTRAVENOUS at 08:24

## 2021-01-21 RX ADMIN — CEFTRIAXONE 1000 MG: 1 INJECTION, POWDER, FOR SOLUTION INTRAMUSCULAR; INTRAVENOUS at 13:20

## 2021-01-21 RX ADMIN — Medication 1 TABLET: at 08:24

## 2021-01-21 RX ADMIN — POLYETHYLENE GLYCOL 3350 17 G: 17 POWDER, FOR SOLUTION ORAL at 08:24

## 2021-01-21 RX ADMIN — ACETAMINOPHEN 650 MG: 325 TABLET, FILM COATED ORAL at 22:48

## 2021-01-21 RX ADMIN — SODIUM CHLORIDE SOLN NEBU 3% 4 ML: 3 NEBU SOLN at 07:47

## 2021-01-21 RX ADMIN — DOXYCYCLINE 100 MG: 100 CAPSULE ORAL at 08:24

## 2021-01-21 RX ADMIN — VANCOMYCIN HYDROCHLORIDE 1250 MG: 10 INJECTION, POWDER, LYOPHILIZED, FOR SOLUTION INTRAVENOUS at 22:45

## 2021-01-21 RX ADMIN — BISACODYL 10 MG: 10 SUPPOSITORY RECTAL at 23:45

## 2021-01-21 RX ADMIN — LEVALBUTEROL HYDROCHLORIDE 1.25 MG: 1.25 SOLUTION, CONCENTRATE RESPIRATORY (INHALATION) at 07:47

## 2021-01-21 RX ADMIN — HYDROMORPHONE HYDROCHLORIDE 1 MG: 1 INJECTION, SOLUTION INTRAMUSCULAR; INTRAVENOUS; SUBCUTANEOUS at 18:38

## 2021-01-21 RX ADMIN — Medication 1.75 MG/HR: at 16:23

## 2021-01-21 RX ADMIN — CHLORHEXIDINE GLUCONATE 0.12% ORAL RINSE 15 ML: 1.2 LIQUID ORAL at 22:00

## 2021-01-21 RX ADMIN — SODIUM CHLORIDE SOLN NEBU 3% 4 ML: 3 NEBU SOLN at 02:33

## 2021-01-21 RX ADMIN — SODIUM CHLORIDE 0.2 MCG/KG/HR: 9 INJECTION, SOLUTION INTRAVENOUS at 13:00

## 2021-01-21 RX ADMIN — GABAPENTIN 200 MG: 100 CAPSULE ORAL at 08:24

## 2021-01-21 RX ADMIN — HYDROMORPHONE HYDROCHLORIDE 1 MG: 1 INJECTION, SOLUTION INTRAMUSCULAR; INTRAVENOUS; SUBCUTANEOUS at 22:28

## 2021-01-21 RX ADMIN — PANTOPRAZOLE SODIUM 40 MG: 40 INJECTION, POWDER, FOR SOLUTION INTRAVENOUS at 22:50

## 2021-01-21 RX ADMIN — SODIUM CHLORIDE 6 UNITS/HR: 9 INJECTION, SOLUTION INTRAVENOUS at 20:34

## 2021-01-21 RX ADMIN — LEVALBUTEROL HYDROCHLORIDE 1.25 MG: 1.25 SOLUTION, CONCENTRATE RESPIRATORY (INHALATION) at 19:58

## 2021-01-21 NOTE — PROGRESS NOTES
Daily Progress Note - Critical Care   Vignesh Kelyl 56 y o  male MRN: [id number]  Unit/Bed#: MICU 6 Encounter: [id number]        ----------------------------------------------------------------------------------------  HPI/24hr events:   56year old man with Hep C, DM2, HTN, HLP, and chronic methadone use  Found to be COVID-19 (+) 12/31  Presented for recurrent N/V at [facility]   Found to have DKA, transferred to [facility] on 1/11, treated DKA, treated for COVID on severe pathway  Had encephalopathy and increased WOB, intubated on 1/12  Gonzalez Alice found to have hematemesis/coffee grounds prior to intubation and placed on PPI, EGD not performed  Also with hematuria s/p cystoscopy and catheter placed  Cold RLE noted, vascular surgery consulted and started on UFH gtt and transferred to [facility] 1/13 for further vascular surgery evaluation  Found to have acute limb ischemia secondary to emboli from aortic thrombus s/p AKA on 1/14  Underwent formalization of AKA on 1/18       Yesterday, pt was weaned to dilaudid 1 75 due to not tolerating initial decrease to 1 5  In the afternoon, he had increased RR and desat to high 80s, propofol was increased to 45 and % minute volume increased from 80% to 100%  OVN pt remained intubated  He was hypotensive, not responsive to albumin  Started on levo  Checked another H&H, Hgb 6 6  Transfused 1U PRBC, repeat Hgb 8 7  BP's remained on lower side and pt continued on levo 2  Propofol was weaned to 30 with not much improvement in BP's, though per nursing pt's pressures improve on stimulation and drop when left alone  At some point during the night, %minute volume was increased to 220%  Rest of vent settings remained unchanged  ---------------------------------------------------------------------------------------  SUBJECTIVE  This am pt sedated but arousable and able to respond occasionally by nodding      Review of systems was unable to be performed secondary to sedated and intubated  ---------------------------------------------------------------------------------------  Assessment and Plan:    Neuro:    Diagnosis: Toxic/metabolic encephalopathy, sedation and pain   o Mental status improving  o Profol 30, Dilaudid 1 75, off of ketamine, PRN versed and dilaudid   o Received 1 dose 1 mg dilaudid PRN, and 1 dose 1 mg midazolam PRN over last 24 hours  o Previously did not attempt dilaudid wean at 0 5 intervals, will proceed with 0 25 intervals as tolerated  o Continue IV methadone per acute pain management  o Goal RASS -2 to 0  o CAM-ICU          CV:    Diagnosis: Aortic thrombus, hx hypertension, hypotension   o Goal MAP>65, BP range 106-154/38-50   o Hypotension likely in the setting of sedation, other possible contributing causes include intravascular depletion (given high UOP and diuresis yesterday, however BP did not respond to transfusion), hypocalcemia, re-equilibrating to diuresis and fluid loss(?)    o In the setting of ionized calcium 0 99, giving 1g calcium gluconate for vasoconstrictive effect which may improve BP    o Levophed gtt PRN, was on levo 2, weaned to 1 then held currently  o Holding home BP meds  o Monitor hemodynamics, monitor tele  o On therapeutic lovenox  o Vascular surgery following       Pulm:  · Diagnosis: Acute hypoxic respiratory failure secondary to COVID-19  ? Intubated on 1/12 due to increased work of breathing and hypoxia  ? Current vent settings %, PEEP 6, FiO2 50%  ? Attempt to wean %minute volume and FiO2   ? Not ready for SBT   ? Continue pulmonary hygiene and suctioning  o ABG 7 64/35 9/62 5  o CXR 1/20 shows worsening b/l groundglass opacities    Diagnosis: Pulmonary embolus   ? CTA 1/12 showing single small nonocclusive left lower PE  ? On therapeutic lovenox     GI:   · Diagnosis:  GERD, hepatitis-C, previous signs of upper GI bleed  ? No current evidence of active bleeding  ?  Monitor OGT and signs or symptoms of GI bleed, contact GI and EGD as necessary  ? Continue Protonix BID  ? Trend CBC daily  · Tube feeds and bowel regimen  ? Tube feeds 52 mL/hour, at goal   ? Senokot and MiraLax daily, Dulcolax suppository PRN  ? last bowel movement on 1/19     :   · Diagnosis:  Gross hematuria, ANNALEE-resolved, UTI, likely myoglobinuria  ? No blood in urine currently  ? On urine microscopy 1/12 moderate bacteria, cx negative,   UA on 1/14 showed blood, no bacteria, neg nitrite, small leukocytes  ? Received antibiotics- now on ceftriaxone 1g q24h   ? Per urology, maintain rogers catheter for at least 7 days (1/19) - did not remove given trauma and swelling to  area, will monitor and remove when appropriate  ? Trend BMP, Cr, strict I&O's  ? UOP at goal above 100 mL/ hour           F/E/N:    Fluids: Off fluids, received 20 mg lasix yesterday   Electrolytes: trend and replete as needed   Nutrition: Tube feeds Glucerna 1 2 @ 52 mL/hr, at goal       Heme/Onc:   · Diagnosis:  Anemia  ? Likely acute blood loss anemia following procedure, without other evidence of active bleeding  ? OVN had Hgb 6 6, transfused 1U PRBC with improvement to 8 7   ? Hemoglobin 8 6 this am, unclear cause of anemia with abnormally high response from transfusion    ? Two nights ago also required 1 unit packed red blood cell transfusion for hemoglobin of 6 8 following formalization procedure  ? Trend daily CBC  · Diagnosis: Thrombocytopenia-improved  ? Predated initiation of heparin  ? Trend daily CBC-platelet 278 from 655     Endo:   · Diagnosis:  T2DM, resolved DKA  ? Insulin gtt  ? Sugars controlled 141>182>154>161       ID:   · Diagnosis: COVID19 infection   ? Plan: severe COVID pathway  ? Decadron 0 1 mg/kg day 10/10, switch to solumedrol taper tomorrow   ? Continue vitamins and statin   ? Remdesivr not given  ? Actemra not given  ? Convalescent plasma not given  ?  Ceftriaxone 1g daily day 5/7 and doxycycline 100 mg q12h day 4/6 (end 1/23)    · Diagnosis: Leukocytosis - improving   ? Trend CBC- WBC 17 07 from 14 61, prelim, diff pending   ? Had fever 100 8 throughout the night which resolved in the morning   ? Given repeatedly elevated WBC with fevers, concerned for new source of infection however unclear where source may be   ? Will discuss utility of further work up (blood cx, UA with reflex, wound cx, etc ) and broadening antibiotics   ? Procal 0 96, trended down   ? Blood cx 1/11 and 1/15 no growth   ? Chest x-ray yesterday shows worsening bilateral ground-glass opacities       MSK/Skin:   · Diagnosis: Acute limb ischemia, rhebdomyolysis   ? Duplex lower extremities on 1/12 with R femoral DVT, post intubation RLE found to be pale and pulseless, transferred here for vascular, CT with filling defect in descending aorta consistent with thrombus    ? POD#7 s/p RLE AKA guillotine amputation, POD#3 from formalization of AKA   ? Continue to turn and reposition frequently  ? Pressure off-loading  ? PT/OT, PM&R consulted  ? Monitor UOP and Cr, CK trended down (39,664 on 1/14 to 2,573 on 1/17)  ? D-dimer 2 54 on 1/19 from 12 77 on 1/14   ? On therapeutic lovenox   ? Vascular following   ? Wound care following   ? Pain management  § Acute pain management on board given chronic methadone use- started IV methadone and weaned off ketamine   § Gabapentin 200mg PO TID crushed through OGT, increased from 100 yesterday   § Dilaudid 1 mg q1h PRN for breakthrough pain   § Discussing with anesthesiology utility of regional nerve block, could not give because on heparin gtt - hold on this for now and see how pt tolerates above interventions first  · Diagnosis: Blistering of RUE- improving   ? Possibly IV related, does not seem infectious   ? Continue monitor for signs of infection  ? Ultrasound of upper extremities negative for DVT or superficial thrombophlebitis  ?  Vascular aware of this finding      Disposition: Continue Critical Care   Code Status: Level 1 - Full Code  ---------------------------------------------------------------------------------------  ICU CORE MEASURES    Prophylaxis   VTE Pharmacologic Prophylaxis: Enoxaparin (Lovenox)  VTE Mechanical Prophylaxis: sequential compression device  Stress Ulcer Prophylaxis: Pantoprazole IV     ABCDE Protocol (if indicated)  Plan to perform spontaneous awakening trial today? No  Plan to perform spontaneous breathing trial today? No  Obvious barriers to extubation? Yes  CAM-ICU: Negative    Invasive Devices Review  Invasive Devices     Central Venous Catheter Line            CVC Central Lines 21 Triple Right 8 days          Peripheral Intravenous Line            Peripheral IV 21 Right;Upper Arm 2 days    Peripheral IV 21 Left;Proximal;Ventral (anterior) Forearm 2 days          Arterial Line            Arterial Line 21 Radial 8 days          Drain            Urethral Catheter 18 Fr  -- days    NG/OG/Enteral Tube Orogastric 16 Fr 9 days          Airway            ETT  Oral;Hi-Lo; Cuffed 7 5 mm 9 days              Can any invasive devices be discontinued today? No  ---------------------------------------------------------------------------------------  OBJECTIVE    Vitals   Vitals:    21 0428 21 0450 21 0500 21 0539   BP:       Pulse: 86 84 80    Resp: (!) 30 (!) 30 (!) 30    Temp: 99 7 °F (37 6 °C) 99 7 °F (37 6 °C) 99 7 °F (37 6 °C)    TempSrc:       SpO2: 93% 94% 95%    Weight:    78 3 kg (172 lb 9 9 oz)   Height:         Temp (24hrs), Av 3 °F (37 9 °C), Min:99 3 °F (37 4 °C), Max:100 8 °F (38 2 °C)  Current: Temperature: 99 7 °F (37 6 °C)     Respiratory:  SpO2 Device: O2 Device: Ventilator       Invasive/non-invasive ventilation settings   Respiratory    Lab Data (Last 4 hours)    None         O2/Vent Data (Last 4 hours)    None                Physical Exam  Vitals signs and nursing note reviewed  Constitutional:       General: He is not in acute distress  Appearance: He is ill-appearing  HENT:      Head: Normocephalic and atraumatic  Right Ear: External ear normal       Left Ear: External ear normal    Eyes:      Conjunctiva/sclera: Conjunctivae normal       Pupils: Pupils are equal, round, and reactive to light  Neck:      Comments: RIJ CVC in place without bleeding or purulence  Cardiovascular:      Rate and Rhythm: Normal rate and regular rhythm  Pulses: Normal pulses  Heart sounds: Normal heart sounds  No murmur  Pulmonary:      Effort: No respiratory distress  Breath sounds: Rhonchi present  No wheezing or rales  Comments: Mechanical breath sounds equal b/l  Abdominal:      General: Abdomen is flat  Bowel sounds are normal       Palpations: Abdomen is soft  Genitourinary:     Comments: Veras draining sherwin yellow urine  Musculoskeletal:         General: Swelling present  Comments: RLE AKA stump bandaged with no weeping or bleeding noted  LLE with wound dressing on heal, no edema, pulses intact  B/l UE edema especially in hands   Skin:     General: Skin is warm and dry  Capillary Refill: Capillary refill takes less than 2 seconds  Findings: No rash        Comments: RUE blisters have improved- seemed to have popped, without rash or signs of infection   Neurological:      Comments: Sedated but arousable, responds with head nodding, does not follow all commands but does move foot when asked to wiggle toes   Psychiatric:      Comments: sedated         Laboratory and Diagnostics:  Results from last 7 days   Lab Units 01/21/21  0547 01/21/21  0058 01/20/21  2153 01/20/21  1314 01/20/21  0513 01/19/21  0324 01/18/21  2137 01/18/21  1702 01/18/21  0352 01/17/21  0755 01/16/21  1819 01/16/21  0743 01/16/21  0437  01/15/21  1723  01/14/21  2359   WBC Thousand/uL 17 07*  --   --   --  14 61* 19 20*  --  17 84* 18 81* 10 55* 10 93* 10 66* 10 23*   < > 9 54   < > 8 46   HEMOGLOBIN g/dL 8 6* 8 7* 6 6* 7 0* 7 1* 8 1* 7 7* 6 8* 8 3* 8  2* 7 9* 8 0* 8 1*   < > 6 8*   < > 7 8*   HEMATOCRIT % 26 1* 25 7* 19 8* 21 3* 21 7* 23 9*  --  21 3* 25 8* 24 2* 23 8* 24 1* 24 1*   < > 20 6*   < > 23 5*   PLATELETS Thousands/uL 227  --   --   --  179 167  --  159 155 101* 76* 60* 61*   < > 58*   < > 40*   NEUTROS PCT %  --   --   --   --   --   --   --   --   --   --   --  84* 87*  --  90*  --   --    BANDS PCT %  --   --   --   --   --   --   --   --  2  --   --   --   --   --   --   --   --    MONOS PCT %  --   --   --   --   --   --   --   --   --   --   --  10 7  --  6  --   --    MONO PCT %  --   --   --   --   --   --   --   --  1*  --   --   --   --   --   --   --  1*    < > = values in this interval not displayed  Results from last 7 days   Lab Units 01/20/21  0529 01/19/21  0334 01/18/21  1702 01/18/21  0352 01/17/21  0645 01/17/21  0507 01/16/21  1819  01/16/21  0437   SODIUM mmol/L 138 138 140 142 143 143 145   < > 147*   POTASSIUM mmol/L 4 4 4 5 4 5 4 7 4 1 4 2 3 7   < > 3 9   CHLORIDE mmol/L 106 109* 111* 111* 113* 113* 115*   < > 119*   CO2 mmol/L 27 26 26 25 24 26 24   < > 23   ANION GAP mmol/L 5 3* 3* 6 6 4 6   < > 5   BUN mg/dL 24 25 22 18 20 21 22   < > 20   CREATININE mg/dL 0 71 0 72 0 77 0 74 0 80 0 74 0 78   < > 0 66   CALCIUM mg/dL 7 7* 7 7* 7 1* 7 6* 7 5* 7 5* 7 3*   < > 7 1*   GLUCOSE RANDOM mg/dL 138 199* 179* 188* 133 159* 143*   < > 145*   ALT U/L  --   --   --   --   --   --   --   --  82*   AST U/L  --   --   --   --   --   --   --   --  139*   ALK PHOS U/L  --   --   --   --   --   --   --   --  104   ALBUMIN g/dL  --   --   --   --   --   --   --   --  1 6*   TOTAL BILIRUBIN mg/dL  --   --   --   --   --   --   --   --  0 73    < > = values in this interval not displayed       Results from last 7 days   Lab Units 01/20/21  0529 01/19/21  0334 01/18/21  0352 01/17/21  0645 01/17/21  0507 01/16/21  1819 01/16/21  0743   MAGNESIUM mg/dL 2 0 2 0 2 1 1 8 1 8 1 8 2 0   PHOSPHORUS mg/dL 3 7 3 4 3 3 1 9* 2 2* 2 3 2 0*      Results from last 7 days   Lab Units 01/20/21  1314 01/20/21  0433 01/19/21  0311 01/18/21 2038 01/18/21  1412 01/18/21  0352 01/17/21 2024 01/14/21  2356   INR   --   --   --   --   --   --   --   --  1 44*   PTT seconds 64* 52* 70* 66* 73* 107* 76*   < > 37    < > = values in this interval not displayed  ABG:  Results from last 7 days   Lab Units 01/20/21  0452 01/17/21  1204   PH ART  7 461* 7 507*   PCO2 ART mm Hg 35 9* 29 5*   PO2 ART mm Hg 62 5* 98 4   HCO3 ART mmol/L 25 0 22 9   BASE EXC ART mmol/L 1 2 0 2   ABG SOURCE   --  Line, Arterial     VBG:  Results from last 7 days   Lab Units 01/17/21  1204   ABG SOURCE  Line, Arterial     Results from last 7 days   Lab Units 01/19/21  0436   PROCALCITONIN ng/ml 0 96*       Micro  Results from last 7 days   Lab Units 01/15/21  0621 01/15/21  0620   BLOOD CULTURE  No Growth After 5 Days  No Growth After 5 Days  EKG: N/A  Imaging: CXR 1/20 worsened bilateral groundglass opacities     Intake and Output  I/O       01/19 0701 - 01/20 0700 01/20 0701 - 01/21 0700    I V  (mL/kg) 1323 1 (15 9) 695 6 (8 9)    NG/ 850    Feedings 1367 1033    Total Intake(mL/kg) 3570 1 (43) 2578 6 (32 9)    Urine (mL/kg/hr) 3505 (1 8) 3755 (2)    Emesis/NG output  0    Total Output 3505 3755    Net +65 1 -1176 4              UOP: 156 ml/hr     Height and Weights   Height: 5' 6" (167 6 cm)  IBW: 63 8 kg  Body mass index is 27 86 kg/m²  Weight (last 2 days)     Date/Time   Weight    01/21/21 0539   78 3 (172 62)    01/20/21 0600   83 1 (183 2)    01/19/21 0600   83 (182 98)                Nutrition       Diet Orders   (From admission, onward)             Start     Ordered    01/20/21 0650  Diet Enteral/Parenteral; Tube Feeding No Oral Diet; Glucerna 1 2; Continuous; 52; 200;  Water; Every 6 hours  Diet effective now     Question Answer Comment   Diet Type Enteral/Parenteral    Enteral/Parenteral Tube Feeding No Oral Diet    Tube Feeding Formula: Glucerna 1 2 Bolus/Cyclic/Continuous Continuous    Tube Feeding Goal Rate (mL/hr): 52    Tube Feeding flush (mL): 200    Water Flush type: Water    Water flush frequency: Every 6 hours    RD to adjust diet per protocol? Yes        01/20/21 0649              TF currently running at 52 ml/hr with a goal of 52 ml/hr   Formula: Jevity 1 2       Active Medications  Scheduled Meds:  Current Facility-Administered Medications   Medication Dose Route Frequency Provider Last Rate    acetaminophen  650 mg Oral Q6H PRN Ciro Rodriguez MD      atorvastatin  40 mg Oral HS Libby Bruno MD      bisacodyl  10 mg Rectal Daily PRN Ciro Rodriguez MD      cefTRIAXone  1,000 mg Intravenous Q24H Tawnya Singh PA-C 1,000 mg (01/20/21 1359)    chlorhexidine  15 mL Swish & Spit Q12H [address] Ciro Rodriguez MD      cholecalciferol  2,000 Units Oral Daily Ciro Rodriguez MD      dexamethasone  0 1 mg/kg Intravenous Q12H Ciro Rodriguez MD      doxycycline hyclate  100 mg Oral Q12H [address] Tawnya Singh PA-C      enoxaparin  1 mg/kg Subcutaneous Q12H [address] VERONICA Monroy      gabapentin  200 mg Oral TID Yeimy Porter MD      HYDROmorphone  1 75 mg/hr Intravenous Continuous Grace Bruce CRNP 1 75 mg/hr (01/20/21 1336)    HYDROmorphone  1 mg Intravenous Q1H PRN Tawnya Singh PA-C      insulin regular (HumuLIN R,NovoLIN R) infusion  0 3-21 Units/hr Intravenous Titrated Ciro Rodriguez MD 6 Units/hr (01/21/21 5280)    levalbuterol  1 25 mg Nebulization Q6H Ciro Rodriguez MD      methadone  10 mg Intravenous [facility] Albert Ely PA-C      midazolam  1 mg Intravenous Q1H PRN Ciro Rodriguez MD      multivitamin-minerals  1 tablet Oral Daily Ciro Rodrigeuz MD      norepinephrine  1-30 mcg/min Intravenous Titrated Boo Coyne MD 3 mcg/min (01/21/21 7002)    pantoprazole  40 mg Intravenous Q12H [address] [facility]      polyethylene glycol  17 g Oral Daily Tracy Gonzalez MD      propofol  5-50 mcg/kg/min Intravenous Titrated Tracy Gonzalez MD 30 mcg/kg/min (01/21/21 0447)    senna  2 tablet Oral BID Tracy Gonzalez MD      sodium chloride  4 mL Nebulization Q6H Libby Hernandez MD       Continuous Infusions:  HYDROmorphone, 1 75 mg/hr, Last Rate: 1 75 mg/hr (01/20/21 1336)  insulin regular (HumuLIN R,NovoLIN R) infusion, 0 3-21 Units/hr, Last Rate: 6 Units/hr (01/21/21 8080)  norepinephrine, 1-30 mcg/min, Last Rate: 3 mcg/min (01/21/21 0508)  propofol, 5-50 mcg/kg/min, Last Rate: 30 mcg/kg/min (01/21/21 0447)      PRN Meds:   acetaminophen, 650 mg, Q6H PRN  bisacodyl, 10 mg, Daily PRN  HYDROmorphone, 1 mg, Q1H PRN  midazolam, 1 mg, Q1H PRN        Allergies   Allergies   Allergen Reactions    Varenicline      ---------------------------------------------------------------------------------------  Advance Directive and Living Will:      Power of :    POLST:    ---------------------------------------------------------------------------------------    Keyanna Lay MD      Portions of the record may have been created with voice recognition software  Occasional wrong word or "sound a like" substitutions may have occurred due to the inherent limitations of voice recognition software    Read the chart carefully and recognize, using context, where substitutions have occurred

## 2021-01-21 NOTE — QUICK NOTE
Called pt's nephew Judith Avina  Updated him on pt's status and management plan, including attempting to wean sedation, weaning propofol and started precedex, possibility of nerve block and discussion with pain management and anesthesiology, and fever and infection work up  All questions were answered

## 2021-01-21 NOTE — PHYSICAL THERAPY NOTE
Physical Therapy Cancellation Note    PT orders received chart review completed  Pt is currently intubated/sedated and not appropriate to participate in skilled PT at this time  PT will follow and eval as medically appropriate       01/21/21 1200   Note Type   Cancel Reasons Intubated/sedated     Radha Larios, PT

## 2021-01-21 NOTE — RESPIRATORY THERAPY NOTE
RT Ventilator Management Note  David Cvoington 56 y o  male MRN: [id number]  Unit/Bed#: [facility] 11 Encounter: [id number]      Daily Screen       1/20/2021  0715 1/21/2021  0748          Patient safety screen outcome[de-identified]  Failed  Failed      Not Ready for Weaning due to[de-identified]  Underline problem not resolved  Underline problem not resolved              Physical Exam:   Assessment Type: Assess only  Respiratory Pattern: Assisted, Normal  Chest Assessment: Chest expansion symmetrical  Bilateral Breath Sounds: Rhonchi  R Breath Sounds: Rhonchi  L Breath Sounds: Rhonchi  Cough: Strong  Suction: ET Tube  O2 Device: ventilator      Resp Comments: Pt tolerating current ASV settings and appears to be resting comfortably  ASV support decreased to 160% with a target minute ventilation of 10 2  Plan is to discuss changes and further vent management with critical care team  Will continue to monitor and assess per resp protocol

## 2021-01-21 NOTE — OCCUPATIONAL THERAPY NOTE
OT CANCEL NOTE    OT orders received  Chart reviewed  Pt is currently intubated/sedated and not appropriate to engage in skilled OT services at this time  Will hold initial OT evaluation  Will continue to follow pt on caseload and see pt when medically stable and as clinically appropriate         01/21/21 0801   Note Type   Cancel Reasons Intubated/sedated       Regina Shallow MS, OTR/L

## 2021-01-22 ENCOUNTER — APPOINTMENT (INPATIENT)
Dept: RADIOLOGY | Facility: HOSPITAL | Age: 61
DRG: 853 | End: 2021-01-22
Payer: COMMERCIAL

## 2021-01-22 LAB
ANION GAP SERPL CALCULATED.3IONS-SCNC: 6 MMOL/L (ref 4–13)
BUN SERPL-MCNC: 25 MG/DL (ref 5–25)
CA-I BLD-SCNC: 1.03 MMOL/L (ref 1.12–1.32)
CALCIUM SERPL-MCNC: 7.9 MG/DL (ref 8.3–10.1)
CHLORIDE SERPL-SCNC: 104 MMOL/L (ref 100–108)
CO2 SERPL-SCNC: 27 MMOL/L (ref 21–32)
CORTIS SERPL-MCNC: 4 UG/DL
CREAT SERPL-MCNC: 0.6 MG/DL (ref 0.6–1.3)
ERYTHROCYTE [DISTWIDTH] IN BLOOD BY AUTOMATED COUNT: 16.8 % (ref 11.6–15.1)
GFR SERPL CREATININE-BSD FRML MDRD: 108 ML/MIN/1.73SQ M
GLUCOSE SERPL-MCNC: 128 MG/DL (ref 65–140)
GLUCOSE SERPL-MCNC: 130 MG/DL (ref 65–140)
GLUCOSE SERPL-MCNC: 145 MG/DL (ref 65–140)
GLUCOSE SERPL-MCNC: 147 MG/DL (ref 65–140)
GLUCOSE SERPL-MCNC: 151 MG/DL (ref 65–140)
GLUCOSE SERPL-MCNC: 154 MG/DL (ref 65–140)
GLUCOSE SERPL-MCNC: 157 MG/DL (ref 65–140)
GLUCOSE SERPL-MCNC: 162 MG/DL (ref 65–140)
GLUCOSE SERPL-MCNC: 169 MG/DL (ref 65–140)
GLUCOSE SERPL-MCNC: 183 MG/DL (ref 65–140)
GLUCOSE SERPL-MCNC: 192 MG/DL (ref 65–140)
GLUCOSE SERPL-MCNC: 215 MG/DL (ref 65–140)
GLUCOSE SERPL-MCNC: 87 MG/DL (ref 65–140)
HCT VFR BLD AUTO: 27.2 % (ref 36.5–49.3)
HGB BLD-MCNC: 8.9 G/DL (ref 12–17)
MCH RBC QN AUTO: 30 PG (ref 26.8–34.3)
MCHC RBC AUTO-ENTMCNC: 32.7 G/DL (ref 31.4–37.4)
MCV RBC AUTO: 92 FL (ref 82–98)
NRBC BLD AUTO-RTO: 0 /100 WBCS
PHOSPHATE SERPL-MCNC: 3.4 MG/DL (ref 2.3–4.1)
PLATELET # BLD AUTO: 193 THOUSANDS/UL (ref 149–390)
PMV BLD AUTO: 11.6 FL (ref 8.9–12.7)
POTASSIUM SERPL-SCNC: 4.1 MMOL/L (ref 3.5–5.3)
PROCALCITONIN SERPL-MCNC: 0.75 NG/ML
RBC # BLD AUTO: 2.97 MILLION/UL (ref 3.88–5.62)
SODIUM SERPL-SCNC: 137 MMOL/L (ref 136–145)
WBC # BLD AUTO: 13.56 THOUSAND/UL (ref 4.31–10.16)

## 2021-01-22 PROCEDURE — 84100 ASSAY OF PHOSPHORUS: CPT | Performed by: PHYSICIAN ASSISTANT

## 2021-01-22 PROCEDURE — 71045 X-RAY EXAM CHEST 1 VIEW: CPT

## 2021-01-22 PROCEDURE — 84145 PROCALCITONIN (PCT): CPT | Performed by: NURSE PRACTITIONER

## 2021-01-22 PROCEDURE — 80048 BASIC METABOLIC PNL TOTAL CA: CPT | Performed by: PHYSICIAN ASSISTANT

## 2021-01-22 PROCEDURE — 99291 CRITICAL CARE FIRST HOUR: CPT | Performed by: INTERNAL MEDICINE

## 2021-01-22 PROCEDURE — 82533 TOTAL CORTISOL: CPT | Performed by: PHYSICIAN ASSISTANT

## 2021-01-22 PROCEDURE — C9113 INJ PANTOPRAZOLE SODIUM, VIA: HCPCS | Performed by: SURGERY

## 2021-01-22 PROCEDURE — NC001 PR NO CHARGE: Performed by: UROLOGY

## 2021-01-22 PROCEDURE — 94003 VENT MGMT INPAT SUBQ DAY: CPT

## 2021-01-22 PROCEDURE — 94760 N-INVAS EAR/PLS OXIMETRY 1: CPT

## 2021-01-22 PROCEDURE — 82948 REAGENT STRIP/BLOOD GLUCOSE: CPT

## 2021-01-22 PROCEDURE — 99232 SBSQ HOSP IP/OBS MODERATE 35: CPT | Performed by: PHYSICIAN ASSISTANT

## 2021-01-22 PROCEDURE — 93005 ELECTROCARDIOGRAM TRACING: CPT

## 2021-01-22 PROCEDURE — 02HV33Z INSERTION OF INFUSION DEVICE INTO SUPERIOR VENA CAVA, PERCUTANEOUS APPROACH: ICD-10-PCS | Performed by: ANESTHESIOLOGY

## 2021-01-22 PROCEDURE — 82330 ASSAY OF CALCIUM: CPT | Performed by: PHYSICIAN ASSISTANT

## 2021-01-22 PROCEDURE — 36556 INSERT NON-TUNNEL CV CATH: CPT | Performed by: ANESTHESIOLOGY

## 2021-01-22 PROCEDURE — 94640 AIRWAY INHALATION TREATMENT: CPT

## 2021-01-22 PROCEDURE — 85027 COMPLETE CBC AUTOMATED: CPT | Performed by: PHYSICIAN ASSISTANT

## 2021-01-22 RX ORDER — GABAPENTIN 400 MG/1
400 CAPSULE ORAL 3 TIMES DAILY
Status: DISCONTINUED | OUTPATIENT
Start: 2021-01-22 | End: 2021-02-15

## 2021-01-22 RX ORDER — KETOROLAC TROMETHAMINE 30 MG/ML
15 INJECTION, SOLUTION INTRAMUSCULAR; INTRAVENOUS ONCE
Status: COMPLETED | OUTPATIENT
Start: 2021-01-22 | End: 2021-01-22

## 2021-01-22 RX ORDER — ACETAMINOPHEN 325 MG/1
975 TABLET ORAL 3 TIMES DAILY
Status: DISCONTINUED | OUTPATIENT
Start: 2021-01-22 | End: 2021-01-26

## 2021-01-22 RX ORDER — MIDAZOLAM HYDROCHLORIDE 2 MG/2ML
4 INJECTION, SOLUTION INTRAMUSCULAR; INTRAVENOUS ONCE
Status: COMPLETED | OUTPATIENT
Start: 2021-01-22 | End: 2021-01-22

## 2021-01-22 RX ORDER — FENTANYL CITRATE 50 UG/ML
50 INJECTION, SOLUTION INTRAMUSCULAR; INTRAVENOUS ONCE
Status: COMPLETED | OUTPATIENT
Start: 2021-01-22 | End: 2021-01-22

## 2021-01-22 RX ORDER — FENTANYL CITRATE 50 UG/ML
INJECTION, SOLUTION INTRAMUSCULAR; INTRAVENOUS
Status: COMPLETED
Start: 2021-01-22 | End: 2021-01-22

## 2021-01-22 RX ORDER — METHYLPREDNISOLONE SODIUM SUCCINATE 40 MG/ML
40 INJECTION, POWDER, LYOPHILIZED, FOR SOLUTION INTRAMUSCULAR; INTRAVENOUS DAILY
Status: DISCONTINUED | OUTPATIENT
Start: 2021-01-22 | End: 2021-01-23 | Stop reason: ALTCHOICE

## 2021-01-22 RX ADMIN — CEFEPIME HYDROCHLORIDE 2000 MG: 2 INJECTION, POWDER, FOR SOLUTION INTRAVENOUS at 08:29

## 2021-01-22 RX ADMIN — SODIUM CHLORIDE 1.2 MCG/KG/HR: 9 INJECTION, SOLUTION INTRAVENOUS at 21:31

## 2021-01-22 RX ADMIN — PANTOPRAZOLE SODIUM 40 MG: 40 INJECTION, POWDER, FOR SOLUTION INTRAVENOUS at 08:41

## 2021-01-22 RX ADMIN — HYDROMORPHONE HYDROCHLORIDE 1 MG: 1 INJECTION, SOLUTION INTRAMUSCULAR; INTRAVENOUS; SUBCUTANEOUS at 08:41

## 2021-01-22 RX ADMIN — HYDROMORPHONE HYDROCHLORIDE 1 MG: 1 INJECTION, SOLUTION INTRAMUSCULAR; INTRAVENOUS; SUBCUTANEOUS at 04:06

## 2021-01-22 RX ADMIN — ENOXAPARIN SODIUM 80 MG: 80 INJECTION SUBCUTANEOUS at 12:41

## 2021-01-22 RX ADMIN — SODIUM CHLORIDE 1 MCG/KG/HR: 9 INJECTION, SOLUTION INTRAVENOUS at 04:07

## 2021-01-22 RX ADMIN — ACETAMINOPHEN 975 MG: 325 TABLET, FILM COATED ORAL at 12:41

## 2021-01-22 RX ADMIN — METHYLPREDNISOLONE SODIUM SUCCINATE 40 MG: 40 INJECTION, POWDER, FOR SOLUTION INTRAMUSCULAR; INTRAVENOUS at 12:41

## 2021-01-22 RX ADMIN — LEVALBUTEROL HYDROCHLORIDE 1.25 MG: 1.25 SOLUTION, CONCENTRATE RESPIRATORY (INHALATION) at 03:33

## 2021-01-22 RX ADMIN — HYDROMORPHONE HYDROCHLORIDE 1 MG: 1 INJECTION, SOLUTION INTRAMUSCULAR; INTRAVENOUS; SUBCUTANEOUS at 00:15

## 2021-01-22 RX ADMIN — SODIUM CHLORIDE 1.2 MCG/KG/HR: 9 INJECTION, SOLUTION INTRAVENOUS at 15:53

## 2021-01-22 RX ADMIN — Medication 1 TABLET: at 08:41

## 2021-01-22 RX ADMIN — SODIUM CHLORIDE SOLN NEBU 3% 4 ML: 3 NEBU SOLN at 07:54

## 2021-01-22 RX ADMIN — GABAPENTIN 200 MG: 100 CAPSULE ORAL at 08:42

## 2021-01-22 RX ADMIN — MIDAZOLAM 4 MG: 1 INJECTION INTRAMUSCULAR; INTRAVENOUS at 00:43

## 2021-01-22 RX ADMIN — FENTANYL CITRATE 50 MCG: 50 INJECTION INTRAMUSCULAR; INTRAVENOUS at 05:42

## 2021-01-22 RX ADMIN — PANTOPRAZOLE SODIUM 40 MG: 40 INJECTION, POWDER, FOR SOLUTION INTRAVENOUS at 21:07

## 2021-01-22 RX ADMIN — METHADONE HYDROCHLORIDE 10 MG: 10 INJECTION, SOLUTION INTRAMUSCULAR; INTRAVENOUS; SUBCUTANEOUS at 13:52

## 2021-01-22 RX ADMIN — NOREPINEPHRINE BITARTRATE 2 MCG/MIN: 1 INJECTION, SOLUTION, CONCENTRATE INTRAVENOUS at 17:17

## 2021-01-22 RX ADMIN — SENNOSIDES 17.2 MG: 8.6 TABLET, FILM COATED ORAL at 08:41

## 2021-01-22 RX ADMIN — POLYETHYLENE GLYCOL 3350 17 G: 17 POWDER, FOR SOLUTION ORAL at 08:42

## 2021-01-22 RX ADMIN — CHLORHEXIDINE GLUCONATE 0.12% ORAL RINSE 15 ML: 1.2 LIQUID ORAL at 21:07

## 2021-01-22 RX ADMIN — GABAPENTIN 400 MG: 400 CAPSULE ORAL at 21:07

## 2021-01-22 RX ADMIN — Medication 2000 UNITS: at 08:41

## 2021-01-22 RX ADMIN — CHLORHEXIDINE GLUCONATE 0.12% ORAL RINSE 15 ML: 1.2 LIQUID ORAL at 08:41

## 2021-01-22 RX ADMIN — ACETAMINOPHEN 975 MG: 325 TABLET, FILM COATED ORAL at 21:08

## 2021-01-22 RX ADMIN — METHADONE HYDROCHLORIDE 10 MG: 10 INJECTION, SOLUTION INTRAMUSCULAR; INTRAVENOUS; SUBCUTANEOUS at 06:23

## 2021-01-22 RX ADMIN — METHADONE HYDROCHLORIDE 10 MG: 10 INJECTION, SOLUTION INTRAMUSCULAR; INTRAVENOUS; SUBCUTANEOUS at 21:07

## 2021-01-22 RX ADMIN — LEVALBUTEROL HYDROCHLORIDE 1.25 MG: 1.25 SOLUTION, CONCENTRATE RESPIRATORY (INHALATION) at 07:54

## 2021-01-22 RX ADMIN — DEXAMETHASONE SODIUM PHOSPHATE 7.3 MG: 10 INJECTION, SOLUTION INTRAMUSCULAR; INTRAVENOUS at 00:00

## 2021-01-22 RX ADMIN — SODIUM CHLORIDE SOLN NEBU 3% 4 ML: 3 NEBU SOLN at 03:33

## 2021-01-22 RX ADMIN — SENNOSIDES 17.2 MG: 8.6 TABLET, FILM COATED ORAL at 18:10

## 2021-01-22 RX ADMIN — VANCOMYCIN HYDROCHLORIDE 1250 MG: 10 INJECTION, POWDER, LYOPHILIZED, FOR SOLUTION INTRAVENOUS at 22:04

## 2021-01-22 RX ADMIN — CEFEPIME HYDROCHLORIDE 2000 MG: 2 INJECTION, POWDER, FOR SOLUTION INTRAVENOUS at 16:21

## 2021-01-22 RX ADMIN — SODIUM CHLORIDE 9 UNITS/HR: 9 INJECTION, SOLUTION INTRAVENOUS at 16:56

## 2021-01-22 RX ADMIN — SODIUM CHLORIDE SOLN NEBU 3% 4 ML: 3 NEBU SOLN at 19:42

## 2021-01-22 RX ADMIN — Medication 1.75 MG/HR: at 04:06

## 2021-01-22 RX ADMIN — VANCOMYCIN HYDROCHLORIDE 1250 MG: 10 INJECTION, POWDER, LYOPHILIZED, FOR SOLUTION INTRAVENOUS at 09:23

## 2021-01-22 RX ADMIN — KETOROLAC TROMETHAMINE 15 MG: 30 INJECTION, SOLUTION INTRAMUSCULAR at 04:06

## 2021-01-22 RX ADMIN — ENOXAPARIN SODIUM 80 MG: 80 INJECTION SUBCUTANEOUS at 00:00

## 2021-01-22 RX ADMIN — MIDAZOLAM 4 MG: 1 INJECTION INTRAMUSCULAR; INTRAVENOUS at 05:40

## 2021-01-22 RX ADMIN — LEVALBUTEROL HYDROCHLORIDE 1.25 MG: 1.25 SOLUTION, CONCENTRATE RESPIRATORY (INHALATION) at 19:42

## 2021-01-22 RX ADMIN — SODIUM CHLORIDE SOLN NEBU 3% 4 ML: 3 NEBU SOLN at 13:38

## 2021-01-22 RX ADMIN — FENTANYL CITRATE 50 MCG: 50 INJECTION, SOLUTION INTRAMUSCULAR; INTRAVENOUS at 05:42

## 2021-01-22 RX ADMIN — LEVALBUTEROL HYDROCHLORIDE 1.25 MG: 1.25 SOLUTION, CONCENTRATE RESPIRATORY (INHALATION) at 13:38

## 2021-01-22 RX ADMIN — SODIUM CHLORIDE 1.2 MCG/KG/HR: 9 INJECTION, SOLUTION INTRAVENOUS at 10:25

## 2021-01-22 RX ADMIN — GABAPENTIN 400 MG: 400 CAPSULE ORAL at 16:21

## 2021-01-22 RX ADMIN — ATORVASTATIN CALCIUM 40 MG: 40 TABLET, FILM COATED ORAL at 21:07

## 2021-01-22 NOTE — RESPIRATORY THERAPY NOTE
RT Ventilator Management Note  Zackary De Leon 56 y o  male MRN: [id number]  Unit/Bed#: MICU 11 Encounter: [id number]      Daily Screen       1/22/2021  1110 1/22/2021  1509          Patient safety screen outcome[de-identified]  Failed  Failed      Not Ready for Weaning due to[de-identified]  Underline problem not resolved  Underline problem not resolved              Physical Exam:   Assessment Type: Assess only  General Appearance: Awake  Respiratory Pattern: Tachypneic, Assisted  Chest Assessment: Chest expansion symmetrical  Bilateral Breath Sounds: Rhonchi      Resp Comments: pt cont on ASV and tolerating well, pt is resting comfortably and weaned down MV% to 150

## 2021-01-22 NOTE — CASE MANAGEMENT
FMLA and STD completed  TC to Brand[employer] Timmons to confirm where to send completed paperwork  CM left VM requesting return call

## 2021-01-22 NOTE — PROGRESS NOTES
Daily Progress Note - Critical Care   Rosa Thakur 56 y o  male MRN: [id number]  Unit/Bed#: MICU 6 Encounter: [id number]        ----------------------------------------------------------------------------------------  HPI/24hr events:   56year old man with Hep C, DM2, HTN, HLP, and chronic methadone use  Found to be COVID-19 (+) 12/31  Presented for recurrent N/V at [facility]   Found to have DKA, transferred to [facility] on 1/11, treated DKA, treated for COVID on severe pathway  Had encephalopathy and increased WOB, intubated on 1/12  Boris Angle found to have hematemesis/coffee grounds prior to intubation and placed on PPI, EGD not performed  Also with hematuria s/p cystoscopy and catheter placed  Cold RLE noted, vascular surgery consulted and started on UFH gtt and transferred to [facility] 1/13 for further vascular surgery evaluation  Found to have acute limb ischemia secondary to emboli from aortic thrombus s/p AKA on 1/14  Underwent formalization of AKA on 1/18       Overnight pt developed fever to 101 1 and 102 6 max this morning, was tachycardic and tachypneic to 50, hypotensive to a low of 80/34  Antibiotics were broadened to vanc and cefepime  Central line removed and new RIJ placed  Cultures sent from the line  Veras was not pulled due to history of traumatic placement by Urology  Night team did reach out to Urology and they will see today and discuss the need to replace the Veras  Intermittently on and off levo throughout the night  On levo 4 currently  Also had sedation issues with being awake and hypoxia to the 80s  Versed drip was ordered if pt becomes severely agitated but was never started  However, pt did receive multiple PRN doses of dilaudid and versed  On precedex 1 0 this am         ---------------------------------------------------------------------------------------  SUBJECTIVE  This am, pt is awake and able to respond to questions via nodding and shaking head  Nods when asked if he hears me   Nods that he does not like the tube  Nods when asked if in pain, shakes head when asked if its in his leg, and nods when pointing at both arms  Review of systems was unable to be performed secondary to intubated  ---------------------------------------------------------------------------------------  Assessment and Plan:    Neuro:   · Diagnosis: Toxic/metabolic encephalopathy, sedation and pain   ? Mental status improving  ? Precedex 1 2 (increased from 1 0 this am as pt is very awake), Dilaudid 1 75, off of ketamine, off of propofol, PRN versed and dilaudid  ? Received 4 doses 1 mg dilaudid PRN, and 3 doses 1 mg midazolam PRN OVN   ? Previously did not attempt dilaudid wean at 0 5 intervals, will proceed with 0 25 intervals as tolerated however pt has been more awake and in pain, so may not tolerate  ? Continue IV methadone per acute pain management  ? Will discuss further with pain management possibility of either increasing methadone or providing nerve block   ? Goal RASS -2 to 0  ? CAM-ICU      CV:   · Diagnosis:  Aortic thrombus, hx hypertension, hypotension   ? Goal MAP>65, BP range /36-60, MAP 50-94   ? Hypotension likely in the setting of sedation, other possible contributing causes include intravascular depletion, hypocalcemia, re-equilibrating to diuresis and fluid loss, now possible infection  ? Levophed gtt PRN, on levo 2 currently   ? Holding home BP meds  ? Monitor hemodynamics, monitor tele  ? On therapeutic lovenox  ? Vascular surgery following        Pulm:  · Diagnosis: Acute hypoxic respiratory failure secondary to COVID-19  ? Intubated on 1/12 due to increased work of breathing and hypoxia  ? Current vent settings %, PEEP 6, FiO2 50%  ? Attempt to wean %minute volume and FiO2   ? Continue pulmonary hygiene and suctioning  ? ABG 7 64/35 9/62 5  ? CXR 1/20 shows worsening b/l groundglass opacities   ?  CXR today shows stable b/l opacities with CVC in place   · Diagnosis: Pulmonary embolus ? CTA 1/12 showing single small nonocclusive left lower PE  ? On therapeutic lovenox       GI:   · Diagnosis:  GERD, hepatitis-C, previous signs of upper GI bleed  ? No current evidence of active bleeding  ? Monitor OGT and signs or symptoms of GI bleed, contact GI and EGD as necessary  ? Continue Protonix BID  ? Trend CBC daily  · Tube feeds and bowel regimen  ? Tube feeds 52 mL/hour, at goal   ? Senokot and MiraLax daily, Dulcolax suppository PRN  ? last bowel movement on 1/19, received suppository last night  ? If does not have a bowel movement, consider lactulose          :   · Diagnosis:  Gross hematuria, ANNALEE-resolved, UTI, likely myoglobinuria  ? No blood in urine currently  ? On urine microscopy 1/12 moderate bacteria, cx negative,   UA on 1/14 showed blood, no bacteria, neg nitrite, small leukocytes  ? Received antibiotics now broadened to cefepime and vanc   ? Per urology, maintain rogers catheter for at least 7 days (1/19) - did not remove given trauma and swelling to  area, will monitor and remove when appropriate  ? Given concern for infection, urology to see patient today and assess need to replace Rogers  ? Trend BMP, Cr, strict I&O's  ? UOP at goal above 100 mL/ hour            F/E/N:   · Fluids: Off fluids and diuresis   · Electrolytes: trend and replete as needed  · Nutrition: Tube feeds Glucerna 1 2 @ 52 mL/hr, at goal         Heme/Onc:   · Diagnosis:  Anemia- stable   ? Likely acute blood loss anemia following procedure, without other evidence of active bleeding  ? Hgb 8 9 today   ? 1/21 OVN had Hgb 6 6, transfused 1U PRBC with improvement to 8 7   ? Two nights ago also required 1 unit packed red blood cell transfusion for hemoglobin of 6 8 following formalization procedure  ? Trend daily CBC   · Diagnosis:  Thrombocytopenia-improved  ? Predated initiation of heparin  ? Trend daily CBC-platelet 170 from 129     Endo:   · Diagnosis: T2DM, resolved DKA  ? Insulin gtt  ?  Sugars controlled 926>13>181         ID:   · Diagnosis: COVID19 infection   ? Plan: severe COVID pathway  ? Completed 10 days of Decadron 0 1 mg/kg, switch to solumedrol taper today   ? Continue vitamins and statin   ? Remdesivr not given  ? Actemra not given  ? Convalescent plasma not given  ? Ceftriaxone 1g daily day 6/7 and doxycycline 100 mg q12h day 5/6 -> broadened antibiotics   · Diagnosis: Leukocytosis - improving   ? Trend CBC- BWR 51 15 from 17 07   ? Persistent fever overnight, T-max of 102 6°  ? Given repeatedly elevated WBC with fevers, concerned for new source of infection however unclear where source may be - consider ID consult   ? RIJ CVC removed overnight and replaced, cultures drawn from new central line however blood cultures yesterday still not resulted, cultures not drawn from old line   ? Discuss need to remove a-line and rogers (urology)   ? Pending blood cultures from 01/21  ? Pending sputum culture, gram stain shows 1+ epithelial cells, 1+ polys, rare gram + cocci in pairs, rare yeast   ? Procal 0 75 from 0 82 from 0 96, trended down   ? Chest x-ray 1/20 shows worsening bilateral ground-glass opacities, CXR today shows stable b/l opacities   ? Antibiotics: cefepime 2g q8h and vancomycin 1,250 mg q12h         MSK/Skin:   · Diagnosis: Acute limb ischemia, rhabdomyolysis   ? Duplex lower extremities on 1/12 with R femoral DVT, post intubation RLE found to be pale and pulseless, transferred here for vascular, CT with filling defect in descending aorta consistent with thrombus    ? POD#8 s/p RLE AKA guillotine amputation, POD#4 from formalization of AKA   ? Continue to turn and reposition frequently  ? Pressure off-loading  ? PT/OT, PM&R consulted  ? Monitor UOP and Cr, CK trended down (39,664 on 1/14 to 2,573 on 1/17)  ? D-dimer 2 54 on 1/19 from 12 77 on 1/14   ? On therapeutic lovenox   ? Vascular following   ? Wound care following  ?  Pain management  § Acute pain management on board given chronic methadone use- started IV methadone and weaned off ketamine   § Per acute pain, cannot increase dose of methadone  § Gabapentin 200mg PO TID crushed through OGT  § Dilaudid 1 mg q1h PRN for breakthrough pain   § Discussing with anesthesiology utility of regional nerve block now that pt is no longer on heparin gtt   · Diagnosis: Blistering of RUE- improving, Edema   ? Possibly IV related, does not seem infectious   ? Edema equal bilateral, unlikely to be lymphedema or cellulitis, could be third spacing fluid, low albumin, hypocalcemia -> could consider giving low dose albumin as pt could also be intravascularly depleted given low BP's   ? Continue monitor for signs of infection  ? Ultrasound of upper extremities negative for DVT or superficial thrombophlebitis  ? Vascular aware of this finding        Disposition: Continue Critical Care   Code Status: Level 1 - Full Code  ---------------------------------------------------------------------------------------  ICU CORE MEASURES    Prophylaxis   VTE Pharmacologic Prophylaxis: Enoxaparin (Lovenox)  VTE Mechanical Prophylaxis: sequential compression device  Stress Ulcer Prophylaxis: Pantoprazole IV     ABCDE Protocol (if indicated)  Plan to perform spontaneous awakening trial today? No  Plan to perform spontaneous breathing trial today? No  Obvious barriers to extubation? Yes  CAM-ICU: Negative    Invasive Devices Review  Invasive Devices     Central Venous Catheter Line            CVC Central Lines 01/22/21 Triple 16cm less than 1 day          Peripheral Intravenous Line            Peripheral IV 01/18/21 Right;Upper Arm 3 days    Peripheral IV 01/19/21 Left;Proximal;Ventral (anterior) Forearm 3 days          Arterial Line            Arterial Line 01/12/21 Radial 9 days          Drain            Urethral Catheter 18 Fr  -- days    NG/OG/Enteral Tube Orogastric 16 Fr 10 days          Airway            ETT  Oral;Hi-Lo; Cuffed 7 5 mm 10 days              Can any invasive devices be discontinued today? No  ---------------------------------------------------------------------------------------  OBJECTIVE    Vitals   Vitals:    21 0630 21 0700 21 0755 21 0800   BP:       Pulse: 86 90  100   Resp:    Temp: (!) 100 8 °F (38 2 °C) (!) 96 8 °F (36 °C)  99 3 °F (37 4 °C)   TempSrc:       SpO2:   95%    Weight:       Height:         Temp (24hrs), Av 6 °F (38 1 °C), Min:96 8 °F (36 °C), Max:102 6 °F (39 2 °C)  Current: Temperature: 99 3 °F (37 4 °C)    Respiratory:  SpO2 Device: O2 Device: Ventilator       Invasive/non-invasive ventilation settings   Respiratory    Lab Data (Last 4 hours)    None         O2/Vent Data (Last 4 hours)       0755          Patient safety screen outcome: Failed                   Physical Exam  Vitals signs and nursing note reviewed  Constitutional:       General: He is not in acute distress  Appearance: He is ill-appearing  He is not toxic-appearing  HENT:      Head: Normocephalic and atraumatic  Right Ear: External ear normal       Left Ear: External ear normal    Eyes:      Extraocular Movements: Extraocular movements intact  Conjunctiva/sclera: Conjunctivae normal       Pupils: Pupils are equal, round, and reactive to light  Neck:      Comments: [facility] CVC in place with blood but no purulence  Cardiovascular:      Rate and Rhythm: Normal rate and regular rhythm  Pulses: Normal pulses  Heart sounds: Normal heart sounds  No murmur  Pulmonary:      Effort: Pulmonary effort is normal  No respiratory distress  Breath sounds: No wheezing or rales  Comments: Mechanical breath sounds, equal b/l  Abdominal:      General: Abdomen is flat  Bowel sounds are normal  There is no distension  Palpations: Abdomen is soft  Musculoskeletal:         General: Swelling present        Comments: RLE AKA stump bandaged without weeping noted  B/l UE edema especially in hands, worsened    Skin:     General: Skin is warm and dry  Capillary Refill: Capillary refill takes less than 2 seconds  Findings: No rash  Comments: RUE one blister, others have popped and arm is weeping, scabbed   Neurological:      Mental Status: He is alert  Motor: Weakness present  Comments: Alert, nods and shakes head to questions appropriately, follows commands, weak in b/l UE on grasp   Psychiatric:      Comments: Unable to assess       Laboratory and Diagnostics:  Results from last 7 days   Lab Units 01/22/21  0435 01/21/21  0547 01/21/21  0058 01/20/21  2153 01/20/21  1314 01/20/21  0513 01/19/21  0324  01/18/21  1702 01/18/21  0352 01/17/21  0755  01/16/21  0743 01/16/21  0437  01/15/21  1723   WBC Thousand/uL 13 56* 17 07*  --   --   --  14 61* 19 20*  --  17 84* 18 81* 10 55*   < > 10 66* 10 23*   < > 9 54   HEMOGLOBIN g/dL 8 9* 8 6* 8 7* 6 6* 7 0* 7 1* 8 1*   < > 6 8* 8 3* 8 2*   < > 8 0* 8 1*   < > 6 8*   HEMATOCRIT % 27 2* 26 1* 25 7* 19 8* 21 3* 21 7* 23 9*  --  21 3* 25 8* 24 2*   < > 24 1* 24 1*   < > 20 6*   PLATELETS Thousands/uL 193 227  --   --   --  179 167  --  159 155 101*   < > 60* 61*   < > 58*   NEUTROS PCT %  --   --   --   --   --   --   --   --   --   --   --   --  84* 87*  --  90*   BANDS PCT %  --  3  --   --   --   --   --   --   --  2  --   --   --   --   --   --    MONOS PCT %  --   --   --   --   --   --   --   --   --   --   --   --  10 7  --  6   MONO PCT %  --  1*  --   --   --   --   --   --   --  1*  --   --   --   --   --   --     < > = values in this interval not displayed       Results from last 7 days   Lab Units 01/22/21  0516 01/21/21  0547 01/20/21  0529 01/19/21  0334 01/18/21  1702 01/18/21  0352 01/17/21  0645  01/16/21  0437   SODIUM mmol/L 137 136 138 138 140 142 143   < > 147*   POTASSIUM mmol/L 4 1 4 0 4 4 4 5 4 5 4 7 4 1   < > 3 9   CHLORIDE mmol/L 104 103 106 109* 111* 111* 113*   < > 119*   CO2 mmol/L 27 27 27 26 26 25 24   < > 23   ANION GAP mmol/L 6 6 5 3* 3* 6 6   < > 5 BUN mg/dL 25 25 24 25 22 18 20   < > 20   CREATININE mg/dL 0 60 0 68 0 71 0 72 0 77 0 74 0 80   < > 0 66   CALCIUM mg/dL 7 9* 7 7* 7 7* 7 7* 7 1* 7 6* 7 5*   < > 7 1*   GLUCOSE RANDOM mg/dL 154* 196* 138 199* 179* 188* 133   < > 145*   ALT U/L  --  46  --   --   --   --   --   --  82*   AST U/L  --  59*  --   --   --   --   --   --  139*   ALK PHOS U/L  --  162*  --   --   --   --   --   --  104   ALBUMIN g/dL  --  1 4*  --   --   --   --   --   --  1 6*   TOTAL BILIRUBIN mg/dL  --  0 66  --   --   --   --   --   --  0 73    < > = values in this interval not displayed  Results from last 7 days   Lab Units 01/22/21  0516 01/20/21  0529 01/19/21  0334 01/18/21  0352 01/17/21  0645 01/17/21  0507 01/16/21  1819 01/16/21  0743   MAGNESIUM mg/dL  --  2 0 2 0 2 1 1 8 1 8 1 8 2 0   PHOSPHORUS mg/dL 3 4 3 7 3 4 3 3 1 9* 2 2* 2 3 2 0*      Results from last 7 days   Lab Units 01/20/21  1314 01/20/21  0433 01/19/21  0311 01/18/21  2038 01/18/21  1412 01/18/21  0352 01/17/21  2024   PTT seconds 64* 52* 70* 66* 73* 107* 76*              ABG:  Results from last 7 days   Lab Units 01/20/21  0452 01/17/21  1204   PH ART  7 461* 7 507*   PCO2 ART mm Hg 35 9* 29 5*   PO2 ART mm Hg 62 5* 98 4   HCO3 ART mmol/L 25 0 22 9   BASE EXC ART mmol/L 1 2 0 2   ABG SOURCE   --  Line, Arterial     VBG:  Results from last 7 days   Lab Units 01/17/21  1204   ABG SOURCE  Line, Arterial     Results from last 7 days   Lab Units 01/21/21  1235 01/19/21  0436   PROCALCITONIN ng/ml 0 82* 0 96*       Micro  Results from last 7 days   Lab Units 01/21/21  1401 01/21/21  1320 01/15/21  0621 01/15/21  0620   BLOOD CULTURE  Received in Microbiology Lab  Culture in Progress  Received in Microbiology Lab  Culture in Progress  No Growth After 5 Days  No Growth After 5 Days         EKG: N/A  Imaging:  CXR  Right jugular catheter at cavoatrial junction with no pneumothorax   No change in extensive bilateral groundglass opacity due to Covid 19 pneumonia  Intake and Output  I/O       01/20 0701 - 01/21 0700 01/21 0701 - 01/22 0700    I V  (mL/kg) 695 6 (8 9) 714 3 (9 1)    NG/ 480    IV Piggyback  360    Feedings 1033 1245    Total Intake(mL/kg) 2578 6 (32 9) 2799 3 (35 8)    Urine (mL/kg/hr) 3755 (2) 3500 (1 9)    Emesis/NG output 0     Total Output 3755 3500    Net -1176 4 -700 7              UOP: 146 ml/hr     Height and Weights   Height: 5' 6" (167 6 cm)  IBW: 63 8 kg  Body mass index is 27 86 kg/m²  Weight (last 2 days)     Date/Time   Weight    01/21/21 0539   78 3 (172 62)    01/20/21 0600   83 1 (183 2)                Nutrition       Diet Orders   (From admission, onward)             Start     Ordered    01/20/21 0650  Diet Enteral/Parenteral; Tube Feeding No Oral Diet; Glucerna 1 2; Continuous; 52; 200; Water; Every 6 hours  Diet effective now     Question Answer Comment   Diet Type Enteral/Parenteral    Enteral/Parenteral Tube Feeding No Oral Diet    Tube Feeding Formula: Glucerna 1 2    Bolus/Cyclic/Continuous Continuous    Tube Feeding Goal Rate (mL/hr): 52    Tube Feeding flush (mL): 200    Water Flush type: Water    Water flush frequency: Every 6 hours    RD to adjust diet per protocol? Yes        01/20/21 0649              TF currently running at 52 ml/hr with a goal of 52 ml/hr   Formula: Glucerna 1 2       Active Medications  Scheduled Meds:  Current Facility-Administered Medications   Medication Dose Route Frequency Provider Last Rate    acetaminophen  650 mg Oral Q6H PRN Zenda Goldmann, MD      atorvastatin  40 mg Oral HS Zenda Goldmann, MD      bisacodyl  10 mg Rectal Daily PRN Zenda Goldmann, MD      cefepime  2,000 mg Intravenous Q12H Pranav Wren MD Stopped (01/22/21 0000)    chlorhexidine  15 mL Swish & Spit Q12H Pb Multani MD      cholecalciferol  2,000 Units Oral Daily Zenda Goldmann, MD      dexamethasone  0 1 mg/kg Intravenous Q12H Zenda Goldmann, MD Charlyne Jaksch dexmedetomidine  0 1-1 mcg/kg/hr Intravenous Titrated Monique Carlisle MD 1 mcg/kg/hr (01/22/21 0407)    enoxaparin  1 mg/kg Subcutaneous Q12H [facility] VERONICA Acosta      gabapentin  200 mg Oral TID Lizbeth Valdez MD      HYDROmorphone  1 75 mg/hr Intravenous Continuous STONE AcostaNP 1 75 mg/hr (01/22/21 0406)    HYDROmorphone  1 mg Intravenous Q1H PRN Verneice Plough, PA-      insulin regular (HumuLIN R,NovoLIN R) infusion  0 3-21 Units/hr Intravenous Titrated Nyasia Greenfield MD 4 Units/hr (01/22/21 0400)    levalbuterol  1 25 mg Nebulization Q6H Nyasia Greenfield MD      methadone  10 mg Intravenous Q8H [facility]      midazolam  1 mg/hr Intravenous Continuous Monique Carlisle MD Stopped (01/22/21 0240)    midazolam  1 mg Intravenous Q1H PRN Nyasia Greenfield MD      multivitamin-minerals  1 tablet Oral Daily Nyasia Greenfield MD      norepinephrine  1-30 mcg/min Intravenous Titrated Monique Carlisle MD 2 mcg/min (01/22/21 0459)   [facility] pantoprazole  40 mg Intravenous Q12H [facility] Nyasia Greenfield MD      polyethylene glycol  17 g Oral Daily Nyasia Greenfield MD      propofol  5-50 mcg/kg/min Intravenous Titrated Nyasia Greenfield MD Stopped (01/21/21 1723)    senna  2 tablet Oral BID Nyasia Greenfield MD      sodium chloride  4 mL Nebulization Q6H Nyasia Greenfield MD      vancomycin  15 mg/kg Intravenous Q12H Monique Carlisle MD Stopped (01/22/21 0000)     Continuous Infusions:  dexmedetomidine, 0 1-1 mcg/kg/hr, Last Rate: 1 mcg/kg/hr (01/22/21 0407)  HYDROmorphone, 1 75 mg/hr, Last Rate: 1 75 mg/hr (01/22/21 0406)  insulin regular (HumuLIN R,NovoLIN R) infusion, 0 3-21 Units/hr, Last Rate: 4 Units/hr (01/22/21 0400)  midazolam, 1 mg/hr, Last Rate: Stopped (01/22/21 0240)  norepinephrine, 1-30 mcg/min, Last Rate: 2 mcg/min (01/22/21 7386)  propofol, 5-50 mcg/kg/min, Last Rate: Stopped (01/21/21 9189)      PRN Meds:   acetaminophen, 650 mg, Q6H PRN  bisacodyl, 10 mg, Daily PRN  HYDROmorphone, 1 mg, Q1H PRN  midazolam, 1 mg, Q1H PRN        Allergies   Allergies   Allergen Reactions    Varenicline      ---------------------------------------------------------------------------------------  Advance Directive and Living Will:      Power of :    POLST:    ---------------------------------------------------------------------------------------    Max Astudillo MD      Portions of the record may have been created with voice recognition software  Occasional wrong word or "sound a like" substitutions may have occurred due to the inherent limitations of voice recognition software    Read the chart carefully and recognize, using context, where substitutions have occurred

## 2021-01-22 NOTE — PROCEDURES
Central Line Insertion    Date/Time: 1/22/2021 5:37 AM  Performed by: Dorothy Dunlap MD  Authorized by: Dorothy Dunlap MD     Patient location:  ICU  Other Assisting Provider: Yes (comment) Wilhemena Habermann)    Consent:     Consent obtained:  Written    Consent given by:  Healthcare agent    Risks discussed:  Arterial puncture, bleeding, infection and pneumothorax  Universal protocol:     Patient identity confirmed:  Arm band  Pre-procedure details:     Sterile barrier technique: All elements of maximal sterile technique followed      Skin preparation:  ChloraPrep  Indications:     Central line indications: medications requiring central line    Anesthesia (see MAR for exact dosages): Anesthesia method:  Local infiltration    Local anesthetic:  Lidocaine 1% w/o epi  Procedure details:     Location:  Right internal jugular    Vessel type: vein      Laterality:  Right    Approach: percutaneous technique used      Patient position:  Flat    Catheter type:  Triple lumen 16cm    Landmarks identified: yes      Ultrasound guidance: yes      Sterile ultrasound techniques: Sterile gel and sterile probe covers were used      Manometry confirmation: yes      Number of attempts:  1    Successful placement: yes    Post-procedure details:     Post-procedure:  Dressing applied and line sutured    Assessment:  Blood return through all ports    Patient tolerance of procedure:   Tolerated well, no immediate complications

## 2021-01-22 NOTE — NUTRITION
Propofol discontinued  Rec incr Glucerna 1 2 by 10 ml q 4 hr as nataliya to goal of 68 ml/hr to provide qd:1632 ml,1958 Kcal,98 gm PRO,187 gm CHO,98 gm Fat,1314 ml Free H2O,1 6 mg CHO/kg/min  Rec 100 ml Free H2O q 4 hrs  Monitor LFTs Check Ionized Ca

## 2021-01-22 NOTE — QUICK NOTE
Called the patient's nephew Donya Nieves to give him an update on his uncle's condition  Sadly there was no answer  Left message on machine with callback number

## 2021-01-22 NOTE — PROGRESS NOTES
I would hold on a void trial until the patient is extubated  As long as remains intubated in the ICU I would maintain his Veras catheter to gravity

## 2021-01-22 NOTE — PHYSICAL THERAPY NOTE
Physical Therapy Cancellation Note    PT orders received chart review completed  Pt is currently intubated  Discussed pt with team at rounds, potential trial extubation later today  PT will follow and eval as medically appropriate      Kaushal Lewis, PT

## 2021-01-22 NOTE — PROGRESS NOTES
Vancomycin IV Pharmacy-to-Dose Consultation    Ivonne Ovalle is a 56 y o  male who is currently receiving vancomycin IV with management by the Pharmacy Consult service for the treatment of other septic shock  Assessment/Plan:    The patient's chart was reviewed  Patient has been restarted on vanco  Last dose 1/15 - 1500 mg IV q12h with trough 23 (extrapolated 21 3); ke 0 0700, t1/2 9 9  Renal function is stable  There are no signs or symptoms of nephrotoxicity and/or infusion reactions documented  The following nephrotoxicity factors are present:  Medications: vasopressors  Patient-Factors: advanced age, hypotension, blood loss (surgery) - AKA 1/14    Based on today's assessment, will continue current vancomycin (Day # 2) dosing of 1250 mg IV q12h, with a plan for trough to be drawn 1/23 at approximately 0930  We will continue to follow the patient's culture results and clinical progress daily      Thank you,  Lena Romero, PharmD, [facility] [address] Pharmacist  [phone]

## 2021-01-22 NOTE — OCCUPATIONAL THERAPY NOTE
OT CANCEL NOTE    OT orders received  Chart reviewed  Pt is currently intubated and not appropriate to engage in skilled OT services at this time  Will hold initial OT evaluation  Will continue to follow pt on caseload and see pt when medically stable and as clinically appropriate       01/22/21 6889   Note Type   Cancel Reasons Intubated/sedated       Wyvonne Shone MS, OTR/L

## 2021-01-22 NOTE — PROGRESS NOTES
Left message for pt with instructions   UROLOGY PROGRESS NOTE   Patient Identifiers: Ivonne Ovalle (MRN [id number])  Date of Service: 1/22/2021    Subjective:    Sedate but awake ventilated in the ICU   T-max 102 6°  WBC 13 56  Creatinine 0 60  Chest x-ray showing extensive ground-glass opacity due to COVID pneumonia        Objective:     VITALS:    Vitals:    01/22/21 1509   BP:    Pulse: 78   Resp: (!) 27   Temp:    SpO2: 98%           LABS:  Lab Results   Component Value Date    HGB 8 9 (L) 01/22/2021    HCT 27 2 (L) 01/22/2021    WBC 13 56 (H) 01/22/2021     01/22/2021   ]    Lab Results   Component Value Date    K 4 1 01/22/2021     01/22/2021    CO2 27 01/22/2021    BUN 25 01/22/2021    CREATININE 0 60 01/22/2021    CALCIUM 7 9 (L) 01/22/2021    GLUCOSE 179 (H) 01/13/2021   ]        INPATIENT MEDS:    Current Facility-Administered Medications:     acetaminophen (TYLENOL) tablet 650 mg, 650 mg, Oral, Q6H PRN, Max Padilla MD, 650 mg at 01/21/21 2248    acetaminophen (TYLENOL) tablet 975 mg, 975 mg, Oral, TID, Marilyn Bhandari PA-C, 975 mg at 01/22/21 1241    atorvastatin (LIPITOR) tablet 40 mg, 40 mg, Oral, HS, Libby Hernandez MD, 40 mg at 01/21/21 2248    bisacodyl (DULCOLAX) rectal suppository 10 mg, 10 mg, Rectal, Daily PRN, Max Padilla MD, 10 mg at 01/21/21 2345    cefepime (MAXIPIME) 2,000 mg in dextrose 5 % 50 mL IVPB, 2,000 mg, Intravenous, Q8H, Jamie Ayala MD    chlorhexidine (PERIDEX) 0 12 % oral rinse 15 mL, 15 mL, Swish & Spit, Q12H [address], Max Padilla MD, 15 mL at 01/22/21 0841    cholecalciferol (VITAMIN D3) tablet 2,000 Units, 2,000 Units, Oral, Daily, Libby Hernandez MD, 2,000 Units at 01/22/21 0841    dexmedetomidine (PRECEDEX) 400 mcg in sodium chloride 0 9 % 100 mL infusion, 0 1-1 2 mcg/kg/hr, Intravenous, Titrated, VERONICA Baird, Last Rate: 23 5 mL/hr at 01/22/21 1553, 1 2 mcg/kg/hr at 01/22/21 1553    enoxaparin (LOVENOX) subcutaneous injection 80 mg, 1 mg/kg, Subcutaneous, Q12H [address], Leva VERONICA Winters, 80 mg at 01/22/21 1241    gabapentin (NEURONTIN) capsule 400 mg, 400 mg, Oral, TID, VERONICA Maradiaga    HYDROmorphone (DILAUDID) 50 mg in sodium chloride 0 9% 50mL drip, 1 75 mg/hr, Intravenous, Continuous, VERONICA Hodge, Last Rate: 1 75 mL/hr at 01/22/21 0406, 1 75 mg/hr at 01/22/21 0406    HYDROmorphone (DILAUDID) injection 1 mg, 1 mg, Intravenous, Q1H PRN, Anuel Randolph PA-C, 1 mg at 01/22/21 0841    insulin regular (HumuLIN R,NovoLIN R) 1 Units/mL in sodium chloride 0 9 % 100 mL infusion, 0 3-21 Units/hr, Intravenous, Titrated, VERONICA Baird, Last Rate: 11 mL/hr at 01/22/21 1449, 11 Units/hr at 01/22/21 1449    levalbuterol ([place]) inhalation solution 1 25 mg, 1 25 mg, Nebulization, Q6H, Libby Hernandez MD, 1 25 mg at 01/22/21 1338    methadone (DOLOPHINE) injection 10 mg, 10 mg, Intravenous, Q8H [address], Tan Trevino PA-C, 10 mg at 01/22/21 1352    methylPREDNISolone sodium succinate (Solu-MEDROL) injection 40 mg, 40 mg, Intravenous, Daily, Pb Granados PA-C, 40 mg at 01/22/21 1241    midazolam (VERSED) injection 1 mg, 1 mg, Intravenous, Q1H PRN, Radha Estevez MD, 1 mg at 01/21/21 2344    [COMPLETED] zinc sulfate (ZINCATE) capsule 220 mg, 220 mg, Oral, Daily, 220 mg at 01/18/21 0800 **FOLLOWED BY** multivitamin-minerals (CENTRUM ADULTS) tablet 1 tablet, 1 tablet, Oral, Daily, Radha Estevez MD, 1 tablet at 01/22/21 0841    norepinephrine (LEVOPHED) 4 mg (STANDARD CONCENTRATION) IV in sodium chloride 0 9% 250 mL, 1-30 mcg/min, Intravenous, Titrated, Jean Claude Nieves MD, Last Rate: 7 5 mL/hr at 01/22/21 1600, 2 mcg/min at 01/22/21 1600    pantoprazole (PROTONIX) injection 40 mg, 40 mg, Intravenous, Q12H [address], Libby Hernandez MD, 40 mg at 01/22/21 0841    polyethylene glycol (MIRALAX) packet 17 g, 17 g, Oral, Daily, Radha Estevez MD, 17 g at 01/22/21 0842    senna (SENOKOT) tablet 17 2 mg, 2 tablet, Oral, BID, Mignon Hathaway MD, 17 2 mg at 01/22/21 0841    sodium chloride 3 % inhalation solution 4 mL, 4 mL, Nebulization, Q6H, Libby Hernandez MD, 4 mL at 01/22/21 1338    vancomycin (VANCOCIN) 1,250 mg in sodium chloride 0 9 % 250 mL IVPB, 15 mg/kg, Intravenous, Q12H, Veronique Lezama MD, Stopped at 01/22/21 1100      Physical Exam:   BP (!) 103/45   Pulse 78   Temp 99 3 °F (37 4 °C)   Resp (!) 27   Ht 5' 6" (1 676 m)   Wt 79 3 kg (174 lb 13 2 oz)   SpO2 98%   BMI 28 22 kg/m²   GEN: no acute distress    RESP: breathing comfortably with no accessory muscle use    ABD: soft, non-tender, non-distended   INCISION:    EXT: Right AKA  1+ left lower extremity edema   (Male): Penis circumcised, mild penile edema meatus patent  Testicles descended into scrotum bilaterally mild scrotal ecchymosis no significant swelling     No inguinal hernias bilaterally  BRONSON: in place draining clear yellow urine  no clots and       RADIOLOGY:    CHEST      IMPRESSION:     Right jugular catheter at cavoatrial junction with no pneumothorax     No change in extensive bilateral groundglass opacity due to Covid 19 pneumonia  Assessment:   1  History difficult Bronson catheter and 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

## 2021-01-23 LAB
ALBUMIN SERPL BCP-MCNC: 1.3 G/DL (ref 3.5–5)
ALP SERPL-CCNC: 148 U/L (ref 46–116)
ALT SERPL W P-5'-P-CCNC: 52 U/L (ref 12–78)
ANION GAP SERPL CALCULATED.3IONS-SCNC: 5 MMOL/L (ref 4–13)
AST SERPL W P-5'-P-CCNC: 43 U/L (ref 5–45)
BACTERIA SPT RESP CULT: ABNORMAL
BASOPHILS # BLD AUTO: 0.02 THOUSANDS/ΜL (ref 0–0.1)
BASOPHILS NFR BLD AUTO: 0 % (ref 0–1)
BILIRUB DIRECT SERPL-MCNC: 0.26 MG/DL (ref 0–0.2)
BILIRUB SERPL-MCNC: 0.59 MG/DL (ref 0.2–1)
BUN SERPL-MCNC: 27 MG/DL (ref 5–25)
CA-I BLD-SCNC: 1.06 MMOL/L (ref 1.12–1.32)
CALCIUM SERPL-MCNC: 7.6 MG/DL (ref 8.3–10.1)
CHLORIDE SERPL-SCNC: 107 MMOL/L (ref 100–108)
CO2 SERPL-SCNC: 26 MMOL/L (ref 21–32)
CREAT SERPL-MCNC: 0.55 MG/DL (ref 0.6–1.3)
EOSINOPHIL # BLD AUTO: 0.04 THOUSAND/ΜL (ref 0–0.61)
EOSINOPHIL NFR BLD AUTO: 0 % (ref 0–6)
ERYTHROCYTE [DISTWIDTH] IN BLOOD BY AUTOMATED COUNT: 16 % (ref 11.6–15.1)
GFR SERPL CREATININE-BSD FRML MDRD: 112 ML/MIN/1.73SQ M
GLUCOSE SERPL-MCNC: 126 MG/DL (ref 65–140)
GLUCOSE SERPL-MCNC: 130 MG/DL (ref 65–140)
GLUCOSE SERPL-MCNC: 134 MG/DL (ref 65–140)
GLUCOSE SERPL-MCNC: 138 MG/DL (ref 65–140)
GLUCOSE SERPL-MCNC: 147 MG/DL (ref 65–140)
GLUCOSE SERPL-MCNC: 147 MG/DL (ref 65–140)
GLUCOSE SERPL-MCNC: 154 MG/DL (ref 65–140)
GLUCOSE SERPL-MCNC: 162 MG/DL (ref 65–140)
GLUCOSE SERPL-MCNC: 165 MG/DL (ref 65–140)
GLUCOSE SERPL-MCNC: 166 MG/DL (ref 65–140)
GLUCOSE SERPL-MCNC: 203 MG/DL (ref 65–140)
GLUCOSE SERPL-MCNC: 204 MG/DL (ref 65–140)
GLUCOSE SERPL-MCNC: 86 MG/DL (ref 65–140)
GLUCOSE SERPL-MCNC: 90 MG/DL (ref 65–140)
GRAM STN SPEC: ABNORMAL
HCT VFR BLD AUTO: 25.5 % (ref 36.5–49.3)
HGB BLD-MCNC: 8.1 G/DL (ref 12–17)
IMM GRANULOCYTES # BLD AUTO: 0.12 THOUSAND/UL (ref 0–0.2)
IMM GRANULOCYTES NFR BLD AUTO: 1 % (ref 0–2)
LIPASE SERPL-CCNC: 99 U/L (ref 73–393)
LYMPHOCYTES # BLD AUTO: 0.66 THOUSANDS/ΜL (ref 0.6–4.47)
LYMPHOCYTES NFR BLD AUTO: 6 % (ref 14–44)
MAGNESIUM SERPL-MCNC: 2 MG/DL (ref 1.6–2.6)
MCH RBC QN AUTO: 29.3 PG (ref 26.8–34.3)
MCHC RBC AUTO-ENTMCNC: 31.8 G/DL (ref 31.4–37.4)
MCV RBC AUTO: 92 FL (ref 82–98)
MONOCYTES # BLD AUTO: 0.4 THOUSAND/ΜL (ref 0.17–1.22)
MONOCYTES NFR BLD AUTO: 4 % (ref 4–12)
NEUTROPHILS # BLD AUTO: 9.39 THOUSANDS/ΜL (ref 1.85–7.62)
NEUTS SEG NFR BLD AUTO: 89 % (ref 43–75)
NRBC BLD AUTO-RTO: 0 /100 WBCS
PHOSPHATE SERPL-MCNC: 3.3 MG/DL (ref 2.3–4.1)
PLATELET # BLD AUTO: 177 THOUSANDS/UL (ref 149–390)
PMV BLD AUTO: 11.3 FL (ref 8.9–12.7)
POTASSIUM SERPL-SCNC: 3.9 MMOL/L (ref 3.5–5.3)
PROT SERPL-MCNC: 6.6 G/DL (ref 6.4–8.2)
RBC # BLD AUTO: 2.76 MILLION/UL (ref 3.88–5.62)
SODIUM SERPL-SCNC: 138 MMOL/L (ref 136–145)
VANCOMYCIN TROUGH SERPL-MCNC: 28.2 UG/ML (ref 10–20)
WBC # BLD AUTO: 10.63 THOUSAND/UL (ref 4.31–10.16)

## 2021-01-23 PROCEDURE — C9113 INJ PANTOPRAZOLE SODIUM, VIA: HCPCS | Performed by: SURGERY

## 2021-01-23 PROCEDURE — 94640 AIRWAY INHALATION TREATMENT: CPT

## 2021-01-23 PROCEDURE — 99292 CRITICAL CARE ADDL 30 MIN: CPT | Performed by: INTERNAL MEDICINE

## 2021-01-23 PROCEDURE — 83690 ASSAY OF LIPASE: CPT | Performed by: PHYSICIAN ASSISTANT

## 2021-01-23 PROCEDURE — 82948 REAGENT STRIP/BLOOD GLUCOSE: CPT

## 2021-01-23 PROCEDURE — 99291 CRITICAL CARE FIRST HOUR: CPT | Performed by: PHYSICIAN ASSISTANT

## 2021-01-23 PROCEDURE — 94003 VENT MGMT INPAT SUBQ DAY: CPT

## 2021-01-23 PROCEDURE — 85025 COMPLETE CBC W/AUTO DIFF WBC: CPT | Performed by: FAMILY MEDICINE

## 2021-01-23 PROCEDURE — 84100 ASSAY OF PHOSPHORUS: CPT | Performed by: PHYSICIAN ASSISTANT

## 2021-01-23 PROCEDURE — 80076 HEPATIC FUNCTION PANEL: CPT | Performed by: PHYSICIAN ASSISTANT

## 2021-01-23 PROCEDURE — 80048 BASIC METABOLIC PNL TOTAL CA: CPT | Performed by: FAMILY MEDICINE

## 2021-01-23 PROCEDURE — 83735 ASSAY OF MAGNESIUM: CPT | Performed by: PHYSICIAN ASSISTANT

## 2021-01-23 PROCEDURE — 82330 ASSAY OF CALCIUM: CPT | Performed by: PHYSICIAN ASSISTANT

## 2021-01-23 PROCEDURE — 87081 CULTURE SCREEN ONLY: CPT | Performed by: PHYSICIAN ASSISTANT

## 2021-01-23 PROCEDURE — 94760 N-INVAS EAR/PLS OXIMETRY 1: CPT

## 2021-01-23 PROCEDURE — 80202 ASSAY OF VANCOMYCIN: CPT | Performed by: NURSE PRACTITIONER

## 2021-01-23 RX ORDER — EPINEPHRINE 1 MG/ML
INJECTION, SOLUTION, CONCENTRATE INTRAVENOUS
Status: DISPENSED
Start: 2021-01-23 | End: 2021-01-23

## 2021-01-23 RX ORDER — FUROSEMIDE 10 MG/ML
20 INJECTION INTRAMUSCULAR; INTRAVENOUS ONCE
Status: COMPLETED | OUTPATIENT
Start: 2021-01-23 | End: 2021-01-23

## 2021-01-23 RX ADMIN — SENNOSIDES 17.2 MG: 8.6 TABLET, FILM COATED ORAL at 17:34

## 2021-01-23 RX ADMIN — ATORVASTATIN CALCIUM 40 MG: 40 TABLET, FILM COATED ORAL at 22:52

## 2021-01-23 RX ADMIN — HYDROCORTISONE SODIUM SUCCINATE 50 MG: 100 INJECTION, POWDER, FOR SOLUTION INTRAMUSCULAR; INTRAVENOUS at 17:11

## 2021-01-23 RX ADMIN — HYDROMORPHONE HYDROCHLORIDE 1 MG: 1 INJECTION, SOLUTION INTRAMUSCULAR; INTRAVENOUS; SUBCUTANEOUS at 10:16

## 2021-01-23 RX ADMIN — Medication 2000 UNITS: at 08:19

## 2021-01-23 RX ADMIN — POLYETHYLENE GLYCOL 3350 17 G: 17 POWDER, FOR SOLUTION ORAL at 08:19

## 2021-01-23 RX ADMIN — LEVALBUTEROL HYDROCHLORIDE 1.25 MG: 1.25 SOLUTION, CONCENTRATE RESPIRATORY (INHALATION) at 13:58

## 2021-01-23 RX ADMIN — ACETAMINOPHEN 975 MG: 325 TABLET, FILM COATED ORAL at 08:20

## 2021-01-23 RX ADMIN — SODIUM CHLORIDE 6 UNITS/HR: 9 INJECTION, SOLUTION INTRAVENOUS at 14:22

## 2021-01-23 RX ADMIN — FUROSEMIDE 20 MG: 10 INJECTION, SOLUTION INTRAMUSCULAR; INTRAVENOUS at 14:13

## 2021-01-23 RX ADMIN — SODIUM CHLORIDE SOLN NEBU 3% 4 ML: 3 NEBU SOLN at 19:25

## 2021-01-23 RX ADMIN — CHLORHEXIDINE GLUCONATE 0.12% ORAL RINSE 15 ML: 1.2 LIQUID ORAL at 08:19

## 2021-01-23 RX ADMIN — SODIUM CHLORIDE 1.2 MCG/KG/HR: 9 INJECTION, SOLUTION INTRAVENOUS at 17:20

## 2021-01-23 RX ADMIN — ACETAMINOPHEN 975 MG: 325 TABLET, FILM COATED ORAL at 22:52

## 2021-01-23 RX ADMIN — GABAPENTIN 400 MG: 400 CAPSULE ORAL at 22:52

## 2021-01-23 RX ADMIN — Medication 1.75 MG/HR: at 04:43

## 2021-01-23 RX ADMIN — LEVALBUTEROL HYDROCHLORIDE 1.25 MG: 1.25 SOLUTION, CONCENTRATE RESPIRATORY (INHALATION) at 00:09

## 2021-01-23 RX ADMIN — Medication 1 TABLET: at 08:20

## 2021-01-23 RX ADMIN — LEVALBUTEROL HYDROCHLORIDE 1.25 MG: 1.25 SOLUTION, CONCENTRATE RESPIRATORY (INHALATION) at 19:24

## 2021-01-23 RX ADMIN — SENNOSIDES 17.2 MG: 8.6 TABLET, FILM COATED ORAL at 08:19

## 2021-01-23 RX ADMIN — CEFEPIME HYDROCHLORIDE 2000 MG: 2 INJECTION, POWDER, FOR SOLUTION INTRAVENOUS at 17:11

## 2021-01-23 RX ADMIN — SODIUM CHLORIDE 1.2 MCG/KG/HR: 9 INJECTION, SOLUTION INTRAVENOUS at 07:06

## 2021-01-23 RX ADMIN — GABAPENTIN 400 MG: 400 CAPSULE ORAL at 08:19

## 2021-01-23 RX ADMIN — FUROSEMIDE 20 MG: 10 INJECTION, SOLUTION INTRAMUSCULAR; INTRAVENOUS at 18:30

## 2021-01-23 RX ADMIN — ENOXAPARIN SODIUM 80 MG: 80 INJECTION SUBCUTANEOUS at 00:54

## 2021-01-23 RX ADMIN — CEFEPIME HYDROCHLORIDE 2000 MG: 2 INJECTION, POWDER, FOR SOLUTION INTRAVENOUS at 00:15

## 2021-01-23 RX ADMIN — GABAPENTIN 400 MG: 400 CAPSULE ORAL at 16:11

## 2021-01-23 RX ADMIN — HYDROMORPHONE HYDROCHLORIDE 1 MG: 1 INJECTION, SOLUTION INTRAMUSCULAR; INTRAVENOUS; SUBCUTANEOUS at 00:52

## 2021-01-23 RX ADMIN — CHLORHEXIDINE GLUCONATE 0.12% ORAL RINSE 15 ML: 1.2 LIQUID ORAL at 22:52

## 2021-01-23 RX ADMIN — HYDROMORPHONE HYDROCHLORIDE 1 MG: 1 INJECTION, SOLUTION INTRAMUSCULAR; INTRAVENOUS; SUBCUTANEOUS at 18:36

## 2021-01-23 RX ADMIN — ACETAMINOPHEN 975 MG: 325 TABLET, FILM COATED ORAL at 16:10

## 2021-01-23 RX ADMIN — SODIUM CHLORIDE SOLN NEBU 3% 4 ML: 3 NEBU SOLN at 08:16

## 2021-01-23 RX ADMIN — METHADONE HYDROCHLORIDE 10 MG: 10 INJECTION, SOLUTION INTRAMUSCULAR; INTRAVENOUS; SUBCUTANEOUS at 22:52

## 2021-01-23 RX ADMIN — SODIUM CHLORIDE SOLN NEBU 3% 4 ML: 3 NEBU SOLN at 13:58

## 2021-01-23 RX ADMIN — ENOXAPARIN SODIUM 80 MG: 80 INJECTION SUBCUTANEOUS at 12:16

## 2021-01-23 RX ADMIN — PANTOPRAZOLE SODIUM 40 MG: 40 INJECTION, POWDER, FOR SOLUTION INTRAVENOUS at 08:19

## 2021-01-23 RX ADMIN — METHYLPREDNISOLONE SODIUM SUCCINATE 40 MG: 40 INJECTION, POWDER, FOR SOLUTION INTRAMUSCULAR; INTRAVENOUS at 08:19

## 2021-01-23 RX ADMIN — CEFEPIME HYDROCHLORIDE 2000 MG: 2 INJECTION, POWDER, FOR SOLUTION INTRAVENOUS at 08:19

## 2021-01-23 RX ADMIN — LEVALBUTEROL HYDROCHLORIDE 1.25 MG: 1.25 SOLUTION, CONCENTRATE RESPIRATORY (INHALATION) at 08:16

## 2021-01-23 RX ADMIN — METHADONE HYDROCHLORIDE 10 MG: 10 INJECTION, SOLUTION INTRAMUSCULAR; INTRAVENOUS; SUBCUTANEOUS at 06:40

## 2021-01-23 RX ADMIN — SODIUM CHLORIDE SOLN NEBU 3% 4 ML: 3 NEBU SOLN at 00:09

## 2021-01-23 RX ADMIN — METHADONE HYDROCHLORIDE 10 MG: 10 INJECTION, SOLUTION INTRAMUSCULAR; INTRAVENOUS; SUBCUTANEOUS at 14:13

## 2021-01-23 RX ADMIN — HYDROCORTISONE SODIUM SUCCINATE 50 MG: 100 INJECTION, POWDER, FOR SOLUTION INTRAMUSCULAR; INTRAVENOUS at 14:13

## 2021-01-23 RX ADMIN — PANTOPRAZOLE SODIUM 40 MG: 40 INJECTION, POWDER, FOR SOLUTION INTRAVENOUS at 22:52

## 2021-01-23 RX ADMIN — SODIUM CHLORIDE 1.2 MCG/KG/HR: 9 INJECTION, SOLUTION INTRAVENOUS at 02:22

## 2021-01-23 RX ADMIN — SODIUM CHLORIDE 1.2 MCG/KG/HR: 9 INJECTION, SOLUTION INTRAVENOUS at 12:50

## 2021-01-23 RX ADMIN — VANCOMYCIN HYDROCHLORIDE 1250 MG: 10 INJECTION, POWDER, LYOPHILIZED, FOR SOLUTION INTRAVENOUS at 09:26

## 2021-01-23 RX ADMIN — SODIUM CHLORIDE 1.2 MCG/KG/HR: 9 INJECTION, SOLUTION INTRAVENOUS at 22:09

## 2021-01-23 NOTE — RESPIRATORY THERAPY NOTE
RT Ventilator Management Note  Mary Connelly 56 y o  male MRN: [id number]  Unit/Bed#: [facility] 11 Encounter: [id number]      Daily Screen       1/22/2021  1110 1/22/2021  1509          Patient safety screen outcome[de-identified]  Failed  Failed      Not Ready for Weaning due to[de-identified]  Underline problem not resolved  Underline problem not resolved              Physical Exam:   Assessment Type: Assess only  General Appearance: Sleeping, Sedated  Respiratory Pattern: Assisted  Chest Assessment: Chest expansion symmetrical  Bilateral Breath Sounds: Coarse  Suction: ET Tube      Resp Comments: Pt has been stable on ASV through the niight  No respiratory distress noted  No changes made to the vent  Will continue to  moniter pt per protocol

## 2021-01-23 NOTE — PROGRESS NOTES
Vancomycin IV Pharmacy-to-Dose Consultation    Criselda Maguire is a 56 y o  male who is currently receiving vancomycin IV with management by the Pharmacy Consult service  Relevant clinical data and objective / subjective history reviewed  Vancomycin Assessment:  Indication: other septic shock  Status: critically ill  Renal Function: UOP 2>1 9>1 2 mL/kg/hr, serum creatinine stable  Potential Nephrotoxicity Factors:  Medications: vasopressors, diuretics  Patient-Factors: advanced age, hypotension  Days of Therapy: 3  Current Dose: 1250 mg IV q12h  Goal Trough: 15-20 (appropriate for most indications)   Goal AUC(24h): 400-600  Last Level: 28 2  Pharmacokinetic Analysis: ke 0 051, t1/2 13 6      Vancomycin Plan:  New Dosing: change to pulse dosing till level <20 then 750 mg IV q12h (anticipate next dose: 1/24 at 0700 to allow for clearance and ensure level is < 20)  Predicted Trough / AUC: 16 1 / 524  Next Level: random level 1/24 with AM labs  Renal Function Monitoring: daily BMP and UOP assessment      Pharmacy will continue to follow closely for s/sx of nephrotoxicity, infusion reactions and appropriateness of therapy  BMP and CBC will be ordered per protocol  We will continue to follow the patient's culture results and clinical progress daily         Thank you,  Faith Barker, PharmD, [address] Pharmacist  [phone]

## 2021-01-23 NOTE — UTILIZATION REVIEW
Continued Stay Review    Date:   1/23                          Current Patient Class:  IP   Current Level of Care: critical     Admitted to outside hospital on 1/11 w/COVID  19 PNU, DKA and hematemesis (OG tube showed no evidence of blood/ hgb stabilized)  Hematuria (urology performed cysto, cleared clots, rec 3 way catheter w/irrigation prn be left for 7 days)    On 1/12 Intubated, CT chest showed left lower lobe PE, with generalized GGO  Right femoral DVT discovered on duplex of lower extremities  Right foot was noted to be cool to touch  1/13  started on UFH gtt , transferred to [facility] for  Vascular surgery to evaluate dvt - higher level of care  HPI:60 y o  male initially  Admitted to critical level of care  On 1/13  for  Management of  Acute limb ischemia RLE, thought to be 2/2 emboli from aortic thrombus to this limb  Arrived intubated/sedated, on mechanical ventilation,  UFH gtt, rising inflammatory markers,  IV ceftriaxone,  protonix gtt,  Isolate and albumin for resuscitation    1/13 performed   EMBOLECTOMY/THROMBECTOMY LOWER EXTREMITY (Right)  ARTERIOGRAM (Right)    1/18  Completion right above knee amputation    1/23  POD 10/5  Remains on mechanical ventilation w/sedation  Encephalopathy improving  Fever- unclear etiology (dcd central line on 1/21 and placed new one, cultures pending, broad spectrum antibiotics) ? AI    PLAN:  wean propofol and start precedex gtt , wean dilaudid gtt, cont gabapentin 400 mg po tid  Cont  tylenol 975 tid  Cont heparin gtt, protonix IV BID,  Tube feeds glucerna  cont covid vitamins   Cont cefepime (d3) and vancomycin (d3), repeat procal tomorrow, mrsa swab  cont insulin infusion, taper solumedrol by 10 mg every 2-3 days  Start solucortef 50 mg q6 hours       Key labs   ddimer 12 77--> 2 54, wbc 12 56, procal 0 75   Sputum gram stain: rare gram positive cocc in pair, rare yeast   Random cortisol 4        Pertinent Labs/Diagnostic Results:   Venous duplex on 01/12 revealed acute nonocclusive deep vein thrombosis of the right lower extremity  CTA on 01/12 chest abdomen pelvis revealed pulmonary emboli            Results from last 7 days   Lab Units 01/23/21  0434 01/22/21  0435 01/21/21  0547 01/21/21  0058 01/20/21  2153  01/18/21  0352   WBC Thousand/uL 10 63* 13 56* 17 07*  --   --    < > 18 81*   HEMOGLOBIN g/dL 8 1* 8 9* 8 6* 8 7* 6 6*   < > 8 3*   HEMATOCRIT % 25 5* 27 2* 26 1* 25 7* 19 8*   < > 25 8*   PLATELETS Thousands/uL 177 193 227  --   --    < > 155   NEUTROS ABS Thousands/µL 9 39*  --   --   --   --   --   --    BANDS PCT %  --   --  3  --   --   --  2    < > = values in this interval not displayed  Results from last 7 days   Lab Units 01/23/21  0434 01/22/21  0516 01/22/21  0439 01/21/21  0547 01/20/21  0529 01/20/21  0452 01/19/21  0334 01/19/21  0333  01/18/21  0352 01/17/21  0645   SODIUM mmol/L 138 137  --  136 138  --  138  --    < > 142 143   POTASSIUM mmol/L 3 9 4 1  --  4 0 4 4  --  4 5  --    < > 4 7 4 1   CHLORIDE mmol/L 107 104  --  103 106  --  109*  --    < > 111* 113*   CO2 mmol/L 26 27  --  27 27  --  26  --    < > 25 24   ANION GAP mmol/L 5 6  --  6 5  --  3*  --    < > 6 6   BUN mg/dL 27* 25  --  25 24  --  25  --    < > 18 20   CREATININE mg/dL 0 55* 0 60  --  0 68 0 71  --  0 72  --    < > 0 74 0 80   EGFR ml/min/1 73sq m 112 108  --  102 101  --  100  --    < > 99 96   CALCIUM mg/dL 7 6* 7 9*  --  7 7* 7 7*  --  7 7*  --    < > 7 6* 7 5*   CALCIUM, IONIZED mmol/L 1 06*  --  1 03* 0 99*  --  1 08*  --  1 07*   < >  --   --    MAGNESIUM mg/dL 2 0  --   --   --  2 0  --  2 0  --   --  2 1 1 8   PHOSPHORUS mg/dL 3 3 3 4  --   --  3 7  --  3 4  --   --  3 3 1 9*    < > = values in this interval not displayed       Results from last 7 days   Lab Units 01/23/21  0434 01/21/21  0547 01/18/21  0352   AST U/L 43 59*  --    ALT U/L 52 46  --    ALK PHOS U/L 148* 162*  --    TOTAL PROTEIN g/dL 6 6 6 4  --    ALBUMIN g/dL 1 3* 1 4*  -- TOTAL BILIRUBIN mg/dL 0 59 0 66  --    BILIRUBIN DIRECT mg/dL 0 26* 0 30*  --    AMMONIA umol/L  --   --  49*     Results from last 7 days   Lab Units 01/23/21  1735 01/23/21  1552 01/23/21  1406 01/23/21  1224 01/23/21  1016 01/23/21  0836 01/23/21  0629 01/23/21  0441 01/23/21  0229 01/23/21  0021 01/23/21  0019 01/22/21  2157   POC GLUCOSE mg/dl 147* 204* 203* 130 138 162* 126 166* 165* 86 90 157*     Results from last 7 days   Lab Units 01/23/21  0434 01/22/21  0516 01/21/21  0547 01/20/21  0529 01/19/21  0334 01/18/21  1702 01/18/21  0352 01/17/21  0645 01/17/21  0507 01/16/21  1819   GLUCOSE RANDOM mg/dL 154* 154* 196* 138 199* 179* 188* 133 159* 143*             BETA-HYDROXYBUTYRATE   Date Value Ref Range Status   01/11/2021 6 2 (H) <0 6 mmol/L Final      Results from last 7 days   Lab Units 01/20/21  0452 01/17/21  1204   PH ART  7 461* 7 507*   PCO2 ART mm Hg 35 9* 29 5*   PO2 ART mm Hg 62 5* 98 4   HCO3 ART mmol/L 25 0 22 9   BASE EXC ART mmol/L 1 2 0 2   O2 CONTENT ART mL/dL 9 5* 12 1*   O2 HGB, ARTERIAL % 90 4* 96 6   ABG SOURCE   --  Line, Arterial             Results from last 7 days   Lab Units 01/17/21  0507   CK TOTAL U/L 2,573*   CK MB INDEX % <1 0   CK MB ng/mL 7 2*         Results from last 7 days   Lab Units 01/19/21  0311   D-DIMER QUANTITATIVE ug/ml FEU 2 54*     Results from last 7 days   Lab Units 01/20/21  1314 01/20/21  0433 01/19/21  0311   PTT seconds 64* 52* 70*         Results from last 7 days   Lab Units 01/22/21  0606 01/21/21  1235 01/19/21  0436   PROCALCITONIN ng/ml 0 75* 0 82* 0 96*     Results from last 7 days   Lab Units 01/23/21  0434   LIPASE u/L 99     Results from last 7 days   Lab Units 01/23/21  0721 01/21/21  1401 01/21/21  1320   BLOOD CULTURE   --  No Growth at 48 hrs  No Growth at 48 hrs     SPUTUM CULTURE  2+ Growth of   --   --    GRAM STAIN RESULT  1+ Epithelial cells per low power field*  1+ Polys*  Rare Gram positive cocci in pairs*  Rare Yeast*  --   -- Vital Signs:   Date/Time  Temp  Pulse  Resp  Arterial Line BP  MAP  SpO2  FiO2 (%)  O2 Device   01/23/21 1604            98 %       01/23/21 1600    70  14  128/48  74 mmHg  98 %       01/23/21 1500    74  23Abnormal   122/48  70 mmHg  98 %       01/23/21 1400    72  26Abnormal   116/44  68 mmHg  98 %       01/23/21 1300    84  25Abnormal   112/42  66 mmHg  98 %       01/23/21 1200  100 °F (37 8 °C)  72  23Abnormal   114/44  66 mmHg  99 %  50  Ventilator         Medications:   Scheduled Medications:  acetaminophen, 975 mg, Oral, TID  atorvastatin, 40 mg, Oral, HS  cefepime, 2,000 mg, Intravenous, Q8H  chlorhexidine, 15 mL, Swish & Spit, Q12H [facility]  cholecalciferol, 2,000 Units, Oral, Daily  enoxaparin, 1 mg/kg, Subcutaneous, Q12H [facility]  EPINEPHrine PF, , ,   furosemide, 20 mg, Intravenous, Once  gabapentin, 400 mg, Oral, TID  hydrocortisone sodium succinate, 50 mg, Intravenous, Q6H LIZETT  levalbuterol, 1 25 mg, Nebulization, Q6H  methadone, 10 mg, Intravenous, Q8H [facility]  multivitamin-minerals, 1 tablet, Oral, Daily  pantoprazole, 40 mg, Intravenous, Q12H LIZETT  polyethylene glycol, 17 g, Oral, Daily  senna, 2 tablet, Oral, BID  sodium chloride, 4 mL, Nebulization, Q6H      Continuous IV Infusions:  dexmedetomidine, 0 1-1 2 mcg/kg/hr, Intravenous, Titrated  HYDROmorphone, 1 75 mg/hr, Intravenous, Continuous  insulin regular (HumuLIN R,NovoLIN R) infusion, 0 3-21 Units/hr, Intravenous, Titrated  norepinephrine, 1-30 mcg/min, Intravenous, Titrated      PRN Meds:  acetaminophen, 650 mg, Oral, Q6H PRN  bisacodyl, 10 mg, Rectal, Daily PRN  HYDROmorphone, 1 mg, Intravenous, Q1H PRN  midazolam, 1 mg, Intravenous, Q1H PRN  [START ON 1/24/2021] vancomycin, 750 mg, Intravenous, Daily PRN        Discharge Plan: tbd    Network Utilization Review Department  ATTENTION: Please call with any questions or concerns to [phone] and carefully listen to the prompts so that you are directed to the right person   All voicemails are confidential   Aleja Yun all requests for admission clinical reviews, approved or denied determinations and any other requests to dedicated fax number below belonging to the campus where the patient is receiving treatment   List of dedicated fax numbers for the Facilities:  [address] DENIALS (Administrative/Medical Necessity) [phone]   [address] (Maternity/NICU/Pediatrics) [phone]   [address] [facility] [address] Malia Robles 5533 (  Kana Epps "Cara" 103) [address] [place] [address] Angeline Almeida [address] [place] [address] [phone]

## 2021-01-23 NOTE — PROGRESS NOTES
Daily Progress Note - Angeline Moss 1260 56 y o  male MRN: [id number]  Unit/Bed#: MICU 11 Encounter: [id number]    ----------------------------------------------------------------------------------------  HPI/24hr events:  · No acute overnight events  Patient remained on ASV ventilation without any significant events  ---------------------------------------------------------------------------------------  Review of Systems   Unable to perform ROS: Intubated     Review of systems was reviewed and negative unless stated above in HPI/24-hour events   ---------------------------------------------------------------------------------------  Assessment and Plan:    Neuro:    Diagnosis: Toxic Metabolic Encephalopathy, Sedation, and Pain Control  o Plan: Mental status has been slowly improving  Patient was alert yesterday and able to follow commands appropriately  o Current sedation regimen Dilaudid 1 75, Precedex 1 2, dilaudid PRNs, gabapentin as below  o Per Dr Oma Tillman with [facility], increased Gabapentin from 200mg TID to 400mg TID  Patient remains on Methadone 10mg IV   o Patient did not receive any PRN pain medications overnight   o Will defer anesthesia nerve block at this time based upon the patient being a few days out from his AKA with formalization  o Will attempt to wean patient down on sedation regimen  Goal RASS 0 to -2   o CAM-ICU      CV:    Diagnosis: Aortic Thrombus  o Plan: On therapeutic Lovenox  o Appreciate vascular surgery's input on AKA with formalization   Diagnosis: Hx HTN  o Plan: Holding home antihypertensives given patient has had hypotension while here in the MICU   o PRN Levophed gtt  o Patient has been hypotensive here in the MICU  Likely in the setting of sedation  Will continue monitoring BP and MAPs in addition to telemetry  o Goal MAP > 65  Overnight MAPs 54-80      Pulm:   Diagnosis: Acute Hypoxemic Respiratory Failure Secondary to COVID-19  o Plan: Intubated on 1/12 for increased work of breathing and hypoxia  o Current ventilator settings /6/50% saturating in the 90s  o Attempt to wean patient as tolerated with hopes for extubation in the foreseeable future  o CXR 1/22/2021 shows stable bilateral infiltrates with a well-placed CVC  o Continue pulmonary hygiene   Diagnosis: Pulmonary Embolus  o Plan: CTA on 1/12/2021 showed single small nonocclusive pulmonary embolus in left lower region  o Patient is anticoagulated on therapeutic Lovenox  GI:    Diagnosis: GERD  o Plan: Protonix BID   Diagnosis: Prior Upper GI Bleed  o Plan: Continue monitoring for signs of bleeding  o Patient on PPI  o Continue trending CBCs daily  · Diagnosis: Tube Feeding and Bowel Regimen  · Patient is on Glucerna feeding at goal   · Bowel regimen in place  No recent BMs documented  · Can increase bowel regimen if need be  :    Diagnosis: Gross Hematuria with Urinary Tract Infection  o Plan: UA on 1/12/2021 showed blood, no bacteria, no nitrites, and small leukocytes  o Patient did have potential myoglobinuria previously  Resolved at this time  o Urology consulted yesterday; noted that they would not consider removing Veras catheter given the patient is still intubated  Given that the Veras catheter was placed by urology, will defer decision to its removal to them  o UA today?  Diagnosis: Acute Kidney Injury  o Plan: Creatinine 0 55 today  Baseline  ANNALEE resolved  o Continue BMP monitoring   o I&O monitoring  F/E/N:    Hypocalcemia at 7 6  Will continue monitoring and may replete   Hypoalbuminemia at 1 3  Will consider albumin   Considering diuresing the patient today given wet-sounding cough  Will consider scopolamine as well for potential hypersecretion   Continue electrolyte monitoring   Replete electrolytes as indicated  Heme/Onc:    Diagnosis: Anemia  o Plan: Hemoglobin 8 1 and stable  Continue monitoring    o Patient has required units of PRBCs after AKA with formalization  Will continue to monitor  Endo:    Diagnosis: T2DM  o Plan: Patient initially presented with DKA which has since resolved   o Insulin gtt  o Continue monitoring  Patient has maintained good glucose control overnight  ID:    Diagnosis: COVID-19 Infection  o Plan: Patient is on the severe COVID pathway  o Completed 10 days of Decadron 0 1 mg/kg  o Patient is on day 2 of Solu-Medrol taper  o Continue vitamin cocktail and statin   o Remdesavir, convalescent plasma, and Actemra not given   o Inflammatory Markers:  - 1/22 Procalcitonin 0 75 (down from 0 82)  - 1/14  (down from 279 4)  - 1/14 Ferritin 1860 (up from 1608)  - 1/19 D-Dimer 2 54 (down from 12 77)   Diagnosis: Leukocytosis  o Plan: WBC count 10 63 from 13 56  Improving  o Continue WBC and temperature trending  o Patient placed on broad-spectrum antibiotics yesterday (vancomycin and cefepime) given temperatures and leukocytosis  o Negative blood cultures from 1/21/2021  o Sputum culture with 2+ mixed respiratory maria de jesus   o Can likely scale back antibiotic regimen given negative cultures  o RIJ CVC removed and replaced, though origin of fever still uncertain  o Continue monitoring Veras for potential for infection  MSK/Skin:    Diagnosis: Acute Limb Ischemia with Rhabdomyolysis  o Plan:  Duplex of the lower extremities on January 12 revealed a right femoral deep venous thrombosis  o Post intubation, the right lower extremity was found to be pale and pulseless  The patient was transferred to [place] for vascular surgery  o CT scan revealed filling defect in descending aorta consistent with a thrombus  o Patient is postop day 9 from a right lower extremity above-the-knee amputation [facility] style  He is postop day for from formalization of the above the knee amputation    o Continue to turn and reposition frequently  o Offload pressure points  o Physical therapy and Occupational therapy consulted  o On therapeutic Lovenox  o But appreciate vascular surgery coming down and evaluating the patient   o Wound Care following  Disposition: Continue Critical Care   Code Status: Level 1 - Full Code  ---------------------------------------------------------------------------------------  ICU CORE MEASURES    Prophylaxis   VTE Pharmacologic Prophylaxis: Enoxaparin (Lovenox)  VTE Mechanical Prophylaxis: sequential compression device  Stress Ulcer Prophylaxis: Pantoprazole IV     Invasive Devices Review  Invasive Devices     Central Venous Catheter Line            CVC Central Lines 21 Triple 16cm 1 day          Arterial Line            Arterial Line 21 Radial 10 days          Drain            Urethral Catheter 18 Fr  -- days    NG/OG/Enteral Tube Orogastric 16 Fr 11 days          Airway            ETT  Oral;Hi-Lo; Cuffed 7 5 mm 11 days              OBJECTIVE    Vitals   Vitals:    21 0500 21 0600 21 0810 21 0850   BP:       Pulse: 86 82     Resp: (!) 25 (!) 25     Temp: 97 9 °F (36 6 °C) 97 9 °F (36 6 °C)  (!) 101 8 °F (38 8 °C)   TempSrc:    Oral   SpO2: 98% 98% 95%    Weight:  78 2 kg (172 lb 6 4 oz)     Height:         Temp (24hrs), Av 4 °F (36 9 °C), Min:95 7 °F (35 4 °C), Max:101 8 °F (38 8 °C)  Current: Temperature: (!) 101 8 °F (38 8 °C)    Respiratory:  SpO2: SpO2: 95 %, SpO2 Activity: SpO2 Activity: At Rest, SpO2 Device: O2 Device: Ventilator, Capnography:         Invasive/non-invasive ventilation settings   Respiratory    Lab Data (Last 4 hours)    None         O2/Vent Data (Last 4 hours)       0810          Patient safety screen outcome: Failed                   Physical Exam  Vitals signs (/6/50% saturating 95%) and nursing note reviewed  Constitutional:       General: He is not in acute distress  Appearance: He is normal weight  He is not ill-appearing or toxic-appearing        Comments: Patient was able to shake his head "yes" to signify he was in pain; however, when I tried to vikas out where he was in pain, he nodded "yes" to everything  Patient nodded "yes" to having a better night sleep and nodded "yes" to being in less pain than yesterday  HENT:      Head: Normocephalic and atraumatic  Nose: Nose normal    Eyes:      General: No scleral icterus  Right eye: No discharge  Left eye: No discharge  Extraocular Movements: Extraocular movements intact  Conjunctiva/sclera: Conjunctivae normal    Cardiovascular:      Rate and Rhythm: Normal rate and regular rhythm  Pulses: Normal pulses  Heart sounds: Normal heart sounds  No murmur  No friction rub  No gallop  Comments: 2+ DP pulse in LLE  Pulmonary:      Breath sounds: No stridor  No wheezing, rhonchi or rales  Comments: Patient had a "wet" sounding cough on examination  Decreased breath sounds bilaterally  Abdominal:      General: Abdomen is flat  Palpations: Abdomen is soft  Genitourinary:     Comments: Veras catheter in place  Urine appears clear but yellow  Veras side with some drainage from the urethral meatus  Scrotal ecchymosis  Musculoskeletal:      Left lower leg: No edema  Comments: RLE surgically absent   Skin:     Capillary Refill: Capillary refill takes less than 2 seconds  Comments: Wound appears to be healing well  Staples still in place  No purulent drainage, streaking, or signs of cellulitis at surgical site  Neurological:      Mental Status: He is alert        Comments: Patient was able to follow commands and participate with exam       Laboratory and Diagnostics:  Results from last 7 days   Lab Units 01/23/21  0434 01/22/21  0435 01/21/21  0547 01/21/21  0058 01/20/21  2153 01/20/21  1314 01/20/21  0513 01/19/21  0324  01/18/21  1702 01/18/21  0352   WBC Thousand/uL 10 63* 13 56* 17 07*  --   --   --  14 61* 19 20*  --  17 84* 18 81*   HEMOGLOBIN g/dL 8 1* 8 9* 8 6* 8 7* 6 6* 7 0* 7 1* 8 1*   < > 6 8* 8 3* HEMATOCRIT % 25 5* 27 2* 26 1* 25 7* 19 8* 21 3* 21 7* 23 9*  --  21 3* 25 8*   PLATELETS Thousands/uL 177 193 227  --   --   --  179 167  --  159 155   NEUTROS PCT % 89*  --   --   --   --   --   --   --   --   --   --    BANDS PCT %  --   --  3  --   --   --   --   --   --   --  2   MONOS PCT % 4  --   --   --   --   --   --   --   --   --   --    MONO PCT %  --   --  1*  --   --   --   --   --   --   --  1*    < > = values in this interval not displayed       Results from last 7 days   Lab Units 01/23/21 0434 01/22/21  0516 01/21/21  0547 01/20/21  0529 01/19/21  0334 01/18/21  1702 01/18/21  0352   SODIUM mmol/L 138 137 136 138 138 140 142   POTASSIUM mmol/L 3 9 4 1 4 0 4 4 4 5 4 5 4 7   CHLORIDE mmol/L 107 104 103 106 109* 111* 111*   CO2 mmol/L 26 27 27 27 26 26 25   ANION GAP mmol/L 5 6 6 5 3* 3* 6   BUN mg/dL 27* 25 25 24 25 22 18   CREATININE mg/dL 0 55* 0 60 0 68 0 71 0 72 0 77 0 74   CALCIUM mg/dL 7 6* 7 9* 7 7* 7 7* 7 7* 7 1* 7 6*   GLUCOSE RANDOM mg/dL 154* 154* 196* 138 199* 179* 188*   ALT U/L 52  --  46  --   --   --   --    AST U/L 43  --  59*  --   --   --   --    ALK PHOS U/L 148*  --  162*  --   --   --   --    ALBUMIN g/dL 1 3*  --  1 4*  --   --   --   --    TOTAL BILIRUBIN mg/dL 0 59  --  0 66  --   --   --   --      Results from last 7 days   Lab Units 01/23/21  0434 01/22/21  0516 01/20/21  0529 01/19/21  0334 01/18/21  0352 01/17/21  0645 01/17/21  0507 01/16/21  1819   MAGNESIUM mg/dL 2 0  --  2 0 2 0 2 1 1 8 1 8 1 8   PHOSPHORUS mg/dL 3 3 3 4 3 7 3 4 3 3 1 9* 2 2* 2 3      Results from last 7 days   Lab Units 01/20/21  1314 01/20/21  0433 01/19/21  0311 01/18/21  2038 01/18/21  1412 01/18/21  0352 01/17/21 2024   PTT seconds 64* 52* 70* 66* 73* 107* 76*              ABG:  Results from last 7 days   Lab Units 01/20/21  0452 01/17/21  1204   PH ART  7 461* 7 507*   PCO2 ART mm Hg 35 9* 29 5*   PO2 ART mm Hg 62 5* 98 4   HCO3 ART mmol/L 25 0 22 9   BASE EXC ART mmol/L 1 2 0 2   ABG SOURCE --  Line, Arterial     VBG:  Results from last 7 days   Lab Units 01/17/21  1204   ABG SOURCE  Line, Arterial     Results from last 7 days   Lab Units 01/22/21  0606 01/21/21  1235 01/19/21  0436   PROCALCITONIN ng/ml 0 75* 0 82* 0 96*       Micro  Results from last 7 days   Lab Units 01/23/21  0721 01/21/21  1401 01/21/21  1320   BLOOD CULTURE   --  No Growth at 24 hrs  No Growth at 24 hrs  SPUTUM CULTURE  2+ Growth of   --   --    GRAM STAIN RESULT  1+ Epithelial cells per low power field*  1+ Polys*  Rare Gram positive cocci in pairs*  Rare Yeast*  --   --      Imaging:  I have personally reviewed pertinent reports  CXR 1/22 without change in bilateral groundglass opacity due to COVID-19 PNA  Intake and Output  I/O       01/21 0701 - 01/22 0700 01/22 0701 - 01/23 0700    I V  (mL/kg) 772 6 (9 7) 886 2 (11 3)    NG/ 710    IV Piggyback 360 675    Feedings 1355 888    Total Intake(mL/kg) 3167 6 (39 9) 3159 2 (40 4)    Urine (mL/kg/hr) 3650 (1 9) 1785 (1)    Total Output 3650 1785    Net -482 4 +1374 2              Height and Weights   Height: 5' 6" (167 6 cm)  IBW: 63 8 kg  Body mass index is 27 83 kg/m²  Weight (last 2 days)     Date/Time   Weight    01/23/21 0600   78 2 (172 4)    01/22/21 0600   79 3 (174 83)    01/21/21 0539   78 3 (172 62)            Nutrition       Diet Orders   (From admission, onward)             Start     Ordered    01/20/21 0650  Diet Enteral/Parenteral; Tube Feeding No Oral Diet; Glucerna 1 2; Continuous; 52; 200; Water; Every 6 hours  Diet effective now     Question Answer Comment   Diet Type Enteral/Parenteral    Enteral/Parenteral Tube Feeding No Oral Diet    Tube Feeding Formula: Glucerna 1 2    Bolus/Cyclic/Continuous Continuous    Tube Feeding Goal Rate (mL/hr): 52    Tube Feeding flush (mL): 200    Water Flush type: Water    Water flush frequency: Every 6 hours    RD to adjust diet per protocol?  Yes        01/20/21 0649              Active Medications  Scheduled Meds:  Current Facility-Administered Medications   Medication Dose Route Frequency Provider Last Rate    acetaminophen  650 mg Oral Q6H PRN Elida Souza MD      acetaminophen  975 mg Oral TID Clint Mckeon PA-C      atorvastatin  40 mg Oral HS Elida Souza MD      bisacodyl  10 mg Rectal Daily PRN Elida Souza MD      cefepime  2,000 mg Intravenous Q8H Lilian Holman MD 2,000 mg (01/23/21 0819)    chlorhexidine  15 mL Swish & Spit Q12H [address] Elida Souza MD      cholecalciferol  2,000 Units Oral Daily Elida Souza MD      dexmedetomidine  0 1-1 2 mcg/kg/hr Intravenous Titrated STONE PryorNP 1 2 mcg/kg/hr (01/23/21 0706)    enoxaparin  1 mg/kg Subcutaneous Q12H [address] VERONICA Elliott      EPINEPHrine PF           gabapentin  400 mg Oral TID VERONICA Pryor      HYDROmorphone  1 75 mg/hr Intravenous Continuous VERONICA Elliott 1 75 mg/hr (01/23/21 0443)    HYDROmorphone  1 mg Intravenous Q1H PRN Teddy Lira PA-C      insulin regular (HumuLIN R,NovoLIN R) infusion  0 3-21 Units/hr Intravenous Titrated STONE PryorNP 4 Units/hr (01/23/21 0847)    levalbuterol  1 25 mg Nebulization Q6H Elida Souza MD      methadone  10 mg Intravenous Q8H [facility], [facility]      methylPREDNISolone sodium succinate  40 mg Intravenous Daily Clint Mckeon PA-C      midazolam  1 mg Intravenous Q1H PRN Elida Souza MD      multivitamin-minerals  1 tablet Oral Daily Elida Souza MD      norepinephrine  1-30 mcg/min Intravenous Titrated Gurjit Courtney MD 2 mcg/min (01/23/21 0901)    pantoprazole  40 mg Intravenous Q12H [address] Elida Souza MD      polyethylene glycol  17 g Oral Daily Elida Souza MD      senna  2 tablet Oral BID Elida Souza MD      sodium chloride  4 mL Nebulization Q6H Elida Souza MD      vancomycin  15 mg/kg Intravenous Q12H Edie Berry MD Stopped (01/23/21 0014)     Continuous Infusions:  dexmedetomidine, 0 1-1 2 mcg/kg/hr, Last Rate: 1 2 mcg/kg/hr (01/23/21 0706)  HYDROmorphone, 1 75 mg/hr, Last Rate: 1 75 mg/hr (01/23/21 0443)  insulin regular (HumuLIN R,NovoLIN R) infusion, 0 3-21 Units/hr, Last Rate: 4 Units/hr (01/23/21 0847)  norepinephrine, 1-30 mcg/min, Last Rate: 2 mcg/min (01/23/21 0901)      PRN Meds:   acetaminophen, 650 mg, Q6H PRN  bisacodyl, 10 mg, Daily PRN  HYDROmorphone, 1 mg, Q1H PRN  midazolam, 1 mg, Q1H PRN      Allergies   Allergies   Allergen Reactions    Varenicline        Care Time Delivered:   Upon my evaluation, this patient had a high probability of imminent or life-threatening deterioration due to ventilator needs, which required my direct attention, intervention, and personal management  I have personally provided 60 minutes (0630 to 0730) of critical care time, exclusive of procedures, teaching, family meetings, and any prior time recorded by providers other than myself  Marlo Toussaint PA-C      Portions of the record may have been created with voice recognition software  Occasional wrong word or "sound a like" substitutions may have occurred due to the inherent limitations of voice recognition software    Read the chart carefully and recognize, using context, where substitutions have occurred

## 2021-01-24 LAB
ANION GAP SERPL CALCULATED.3IONS-SCNC: 7 MMOL/L (ref 4–13)
ATRIAL RATE: 82 BPM
ATRIAL RATE: 99 BPM
BUN SERPL-MCNC: 32 MG/DL (ref 5–25)
CA-I BLD-SCNC: 1.05 MMOL/L (ref 1.12–1.32)
CALCIUM SERPL-MCNC: 7.9 MG/DL (ref 8.3–10.1)
CHLORIDE SERPL-SCNC: 107 MMOL/L (ref 100–108)
CO2 SERPL-SCNC: 25 MMOL/L (ref 21–32)
CREAT SERPL-MCNC: 0.68 MG/DL (ref 0.6–1.3)
ERYTHROCYTE [DISTWIDTH] IN BLOOD BY AUTOMATED COUNT: 15.8 % (ref 11.6–15.1)
GFR SERPL CREATININE-BSD FRML MDRD: 102 ML/MIN/1.73SQ M
GLUCOSE SERPL-MCNC: 115 MG/DL (ref 65–140)
GLUCOSE SERPL-MCNC: 134 MG/DL (ref 65–140)
GLUCOSE SERPL-MCNC: 135 MG/DL (ref 65–140)
GLUCOSE SERPL-MCNC: 137 MG/DL (ref 65–140)
GLUCOSE SERPL-MCNC: 140 MG/DL (ref 65–140)
GLUCOSE SERPL-MCNC: 147 MG/DL (ref 65–140)
GLUCOSE SERPL-MCNC: 149 MG/DL (ref 65–140)
GLUCOSE SERPL-MCNC: 173 MG/DL (ref 65–140)
GLUCOSE SERPL-MCNC: 209 MG/DL (ref 65–140)
GLUCOSE SERPL-MCNC: 214 MG/DL (ref 65–140)
GLUCOSE SERPL-MCNC: 235 MG/DL (ref 65–140)
GLUCOSE SERPL-MCNC: 251 MG/DL (ref 65–140)
HCT VFR BLD AUTO: 24.1 % (ref 36.5–49.3)
HGB BLD-MCNC: 7.5 G/DL (ref 12–17)
MCH RBC QN AUTO: 29.3 PG (ref 26.8–34.3)
MCHC RBC AUTO-ENTMCNC: 31.1 G/DL (ref 31.4–37.4)
MCV RBC AUTO: 94 FL (ref 82–98)
NRBC BLD AUTO-RTO: 0 /100 WBCS
P AXIS: 47 DEGREES
P AXIS: 62 DEGREES
PLATELET # BLD AUTO: 149 THOUSANDS/UL (ref 149–390)
PMV BLD AUTO: 11.4 FL (ref 8.9–12.7)
POTASSIUM SERPL-SCNC: 3.7 MMOL/L (ref 3.5–5.3)
PR INTERVAL: 121 MS
PR INTERVAL: 138 MS
PROCALCITONIN SERPL-MCNC: 0.85 NG/ML
QRS AXIS: 60 DEGREES
QRS AXIS: 60 DEGREES
QRSD INTERVAL: 71 MS
QRSD INTERVAL: 71 MS
QT INTERVAL: 333 MS
QT INTERVAL: 354 MS
QTC INTERVAL: 414 MS
QTC INTERVAL: 428 MS
RBC # BLD AUTO: 2.56 MILLION/UL (ref 3.88–5.62)
SODIUM SERPL-SCNC: 139 MMOL/L (ref 136–145)
T WAVE AXIS: 45 DEGREES
T WAVE AXIS: 57 DEGREES
VANCOMYCIN SERPL-MCNC: 22.6 UG/ML
VENTRICULAR RATE: 82 BPM
VENTRICULAR RATE: 99 BPM
WBC # BLD AUTO: 7.27 THOUSAND/UL (ref 4.31–10.16)

## 2021-01-24 PROCEDURE — 99291 CRITICAL CARE FIRST HOUR: CPT | Performed by: INTERNAL MEDICINE

## 2021-01-24 PROCEDURE — 94640 AIRWAY INHALATION TREATMENT: CPT

## 2021-01-24 PROCEDURE — 82948 REAGENT STRIP/BLOOD GLUCOSE: CPT

## 2021-01-24 PROCEDURE — 84145 PROCALCITONIN (PCT): CPT | Performed by: PHYSICIAN ASSISTANT

## 2021-01-24 PROCEDURE — 85027 COMPLETE CBC AUTOMATED: CPT | Performed by: PHYSICIAN ASSISTANT

## 2021-01-24 PROCEDURE — 80048 BASIC METABOLIC PNL TOTAL CA: CPT | Performed by: STUDENT IN AN ORGANIZED HEALTH CARE EDUCATION/TRAINING PROGRAM

## 2021-01-24 PROCEDURE — 93010 ELECTROCARDIOGRAM REPORT: CPT | Performed by: INTERNAL MEDICINE

## 2021-01-24 PROCEDURE — 82330 ASSAY OF CALCIUM: CPT | Performed by: PHYSICIAN ASSISTANT

## 2021-01-24 PROCEDURE — 94760 N-INVAS EAR/PLS OXIMETRY 1: CPT

## 2021-01-24 PROCEDURE — 93005 ELECTROCARDIOGRAM TRACING: CPT

## 2021-01-24 PROCEDURE — 80202 ASSAY OF VANCOMYCIN: CPT | Performed by: NURSE PRACTITIONER

## 2021-01-24 PROCEDURE — 94003 VENT MGMT INPAT SUBQ DAY: CPT

## 2021-01-24 PROCEDURE — C9113 INJ PANTOPRAZOLE SODIUM, VIA: HCPCS | Performed by: SURGERY

## 2021-01-24 RX ORDER — CALCIUM GLUCONATE 20 MG/ML
2 INJECTION, SOLUTION INTRAVENOUS ONCE
Status: COMPLETED | OUTPATIENT
Start: 2021-01-24 | End: 2021-01-24

## 2021-01-24 RX ORDER — POTASSIUM CHLORIDE 20MEQ/15ML
40 LIQUID (ML) ORAL ONCE
Status: COMPLETED | OUTPATIENT
Start: 2021-01-24 | End: 2021-01-24

## 2021-01-24 RX ADMIN — LEVALBUTEROL HYDROCHLORIDE 1.25 MG: 1.25 SOLUTION, CONCENTRATE RESPIRATORY (INHALATION) at 19:22

## 2021-01-24 RX ADMIN — SODIUM CHLORIDE 1.2 MCG/KG/HR: 9 INJECTION, SOLUTION INTRAVENOUS at 07:54

## 2021-01-24 RX ADMIN — ACETAMINOPHEN 975 MG: 325 TABLET, FILM COATED ORAL at 15:44

## 2021-01-24 RX ADMIN — Medication 1 TABLET: at 09:18

## 2021-01-24 RX ADMIN — Medication 2000 UNITS: at 09:18

## 2021-01-24 RX ADMIN — SODIUM CHLORIDE 1.2 MCG/KG/HR: 9 INJECTION, SOLUTION INTRAVENOUS at 02:45

## 2021-01-24 RX ADMIN — LEVALBUTEROL HYDROCHLORIDE 1.25 MG: 1.25 SOLUTION, CONCENTRATE RESPIRATORY (INHALATION) at 13:23

## 2021-01-24 RX ADMIN — SODIUM CHLORIDE SOLN NEBU 3% 4 ML: 3 NEBU SOLN at 07:37

## 2021-01-24 RX ADMIN — HYDROMORPHONE HYDROCHLORIDE 1 MG: 1 INJECTION, SOLUTION INTRAMUSCULAR; INTRAVENOUS; SUBCUTANEOUS at 13:27

## 2021-01-24 RX ADMIN — SODIUM CHLORIDE SOLN NEBU 3% 4 ML: 3 NEBU SOLN at 19:22

## 2021-01-24 RX ADMIN — CALCIUM GLUCONATE 2 G: 20 INJECTION, SOLUTION INTRAVENOUS at 13:54

## 2021-01-24 RX ADMIN — GABAPENTIN 400 MG: 400 CAPSULE ORAL at 21:23

## 2021-01-24 RX ADMIN — CHLORHEXIDINE GLUCONATE 0.12% ORAL RINSE 15 ML: 1.2 LIQUID ORAL at 09:18

## 2021-01-24 RX ADMIN — HYDROCORTISONE SODIUM SUCCINATE 50 MG: 100 INJECTION, POWDER, FOR SOLUTION INTRAMUSCULAR; INTRAVENOUS at 00:46

## 2021-01-24 RX ADMIN — POTASSIUM CHLORIDE 40 MEQ: 20 SOLUTION ORAL at 13:54

## 2021-01-24 RX ADMIN — CEFEPIME HYDROCHLORIDE 2000 MG: 2 INJECTION, POWDER, FOR SOLUTION INTRAVENOUS at 15:55

## 2021-01-24 RX ADMIN — SODIUM CHLORIDE 1.2 MCG/KG/HR: 9 INJECTION, SOLUTION INTRAVENOUS at 13:00

## 2021-01-24 RX ADMIN — GABAPENTIN 400 MG: 400 CAPSULE ORAL at 15:45

## 2021-01-24 RX ADMIN — SODIUM CHLORIDE 12 UNITS/HR: 9 INJECTION, SOLUTION INTRAVENOUS at 12:13

## 2021-01-24 RX ADMIN — GABAPENTIN 400 MG: 400 CAPSULE ORAL at 09:18

## 2021-01-24 RX ADMIN — SODIUM CHLORIDE SOLN NEBU 3% 4 ML: 3 NEBU SOLN at 13:23

## 2021-01-24 RX ADMIN — CHLORHEXIDINE GLUCONATE 0.12% ORAL RINSE 15 ML: 1.2 LIQUID ORAL at 21:25

## 2021-01-24 RX ADMIN — ENOXAPARIN SODIUM 80 MG: 80 INJECTION SUBCUTANEOUS at 12:49

## 2021-01-24 RX ADMIN — SENNOSIDES 17.2 MG: 8.6 TABLET, FILM COATED ORAL at 17:44

## 2021-01-24 RX ADMIN — PANTOPRAZOLE SODIUM 40 MG: 40 INJECTION, POWDER, FOR SOLUTION INTRAVENOUS at 21:23

## 2021-01-24 RX ADMIN — HYDROCORTISONE SODIUM SUCCINATE 50 MG: 100 INJECTION, POWDER, FOR SOLUTION INTRAMUSCULAR; INTRAVENOUS at 17:44

## 2021-01-24 RX ADMIN — POLYETHYLENE GLYCOL 3350 17 G: 17 POWDER, FOR SOLUTION ORAL at 09:18

## 2021-01-24 RX ADMIN — SODIUM CHLORIDE 1.2 MCG/KG/HR: 9 INJECTION, SOLUTION INTRAVENOUS at 23:08

## 2021-01-24 RX ADMIN — ACETAMINOPHEN 975 MG: 325 TABLET, FILM COATED ORAL at 21:23

## 2021-01-24 RX ADMIN — HYDROMORPHONE HYDROCHLORIDE 1 MG: 1 INJECTION, SOLUTION INTRAMUSCULAR; INTRAVENOUS; SUBCUTANEOUS at 04:52

## 2021-01-24 RX ADMIN — CEFEPIME HYDROCHLORIDE 2000 MG: 2 INJECTION, POWDER, FOR SOLUTION INTRAVENOUS at 09:23

## 2021-01-24 RX ADMIN — CEFEPIME HYDROCHLORIDE 2000 MG: 2 INJECTION, POWDER, FOR SOLUTION INTRAVENOUS at 00:28

## 2021-01-24 RX ADMIN — PANTOPRAZOLE SODIUM 40 MG: 40 INJECTION, POWDER, FOR SOLUTION INTRAVENOUS at 09:19

## 2021-01-24 RX ADMIN — LEVALBUTEROL HYDROCHLORIDE 1.25 MG: 1.25 SOLUTION, CONCENTRATE RESPIRATORY (INHALATION) at 00:30

## 2021-01-24 RX ADMIN — ATORVASTATIN CALCIUM 40 MG: 40 TABLET, FILM COATED ORAL at 21:23

## 2021-01-24 RX ADMIN — METHADONE HYDROCHLORIDE 10 MG: 10 INJECTION, SOLUTION INTRAMUSCULAR; INTRAVENOUS; SUBCUTANEOUS at 13:26

## 2021-01-24 RX ADMIN — SODIUM CHLORIDE SOLN NEBU 3% 4 ML: 3 NEBU SOLN at 00:31

## 2021-01-24 RX ADMIN — ACETAMINOPHEN 975 MG: 325 TABLET, FILM COATED ORAL at 09:18

## 2021-01-24 RX ADMIN — METHADONE HYDROCHLORIDE 10 MG: 10 INJECTION, SOLUTION INTRAMUSCULAR; INTRAVENOUS; SUBCUTANEOUS at 06:08

## 2021-01-24 RX ADMIN — HYDROCORTISONE SODIUM SUCCINATE 50 MG: 100 INJECTION, POWDER, FOR SOLUTION INTRAMUSCULAR; INTRAVENOUS at 12:49

## 2021-01-24 RX ADMIN — METHADONE HYDROCHLORIDE 10 MG: 10 INJECTION, SOLUTION INTRAMUSCULAR; INTRAVENOUS; SUBCUTANEOUS at 21:23

## 2021-01-24 RX ADMIN — SENNOSIDES 17.2 MG: 8.6 TABLET, FILM COATED ORAL at 09:18

## 2021-01-24 RX ADMIN — LEVALBUTEROL HYDROCHLORIDE 1.25 MG: 1.25 SOLUTION, CONCENTRATE RESPIRATORY (INHALATION) at 07:37

## 2021-01-24 RX ADMIN — VANCOMYCIN HYDROCHLORIDE 1500 MG: 10 INJECTION, POWDER, LYOPHILIZED, FOR SOLUTION INTRAVENOUS at 13:54

## 2021-01-24 RX ADMIN — ENOXAPARIN SODIUM 80 MG: 80 INJECTION SUBCUTANEOUS at 00:46

## 2021-01-24 RX ADMIN — HYDROCORTISONE SODIUM SUCCINATE 50 MG: 100 INJECTION, POWDER, FOR SOLUTION INTRAMUSCULAR; INTRAVENOUS at 06:08

## 2021-01-24 RX ADMIN — Medication 1.75 MG/HR: at 05:02

## 2021-01-24 NOTE — PROGRESS NOTES
Vancomycin IV Pharmacy-to-Dose Consultation    Vernon Louis is a 56 y o  male who is currently receiving vancomycin IV with management by the Pharmacy Consult service  Relevant clinical data and objective / subjective history reviewed  Vancomycin Assessment:  Indication: other septic shock  Status: critically ill  Renal Function: stable, UOP 2 mL/kg/hr  Potential Nephrotoxicity Factors:  Medications: vasopressors  Patient-Factors: advanced age, hypotension, renal dysfunction  Days of Therapy: 4  Current Dose: 1250 mg IV q12h (last dose 1/23 at 0926)  Goal Trough: 15-20 (appropriate for most indications)   Goal AUC(24h): 400-600  Last Level: 22 6  Pharmacokinetic Analysis: ke 0 0439, t1/2 15 8      Vancomycin Plan:  New Dosing: change to 1500 mg IV q24h (next dose: based on pharmacokinetics level should be approx 16 6 at 1300 today)  Predicted Trough / AUC: ~15 / 610  Next Level: trough level 1/27 at 1230  Renal Function Monitoring: daily BMP and UOP assessment      Pharmacy will continue to follow closely for s/sx of nephrotoxicity, infusion reactions and appropriateness of therapy  BMP and CBC will be ordered per protocol  We will continue to follow the patient's culture results and clinical progress daily         Thank you,  Elif Zambrano, PharmD, [facility] Pharmacist  [phone]

## 2021-01-24 NOTE — RESPIRATORY THERAPY NOTE
RT Ventilator Management Note  Debo Eddy 56 y o  male MRN: [id number]  Unit/Bed#: [facility] 11 Encounter: [id number]      Daily Screen       1/22/2021  1509 1/23/2021  0810          Patient safety screen outcome[de-identified]  Failed  Failed      Not Ready for Weaning due to[de-identified]  Underline problem not resolved  Underline problem not resolved              Physical Exam:   Assessment Type: Assess only  General Appearance: Sleeping  Respiratory Pattern: Assisted  Chest Assessment: Chest expansion symmetrical  Bilateral Breath Sounds: Coarse, Rhonchi  Suction: ET Tube      Resp Comments: Pt has done well through the night on ASV settings  No respiratory distress is noted  No changes made to vent  Will continue to moniter pt per protocol

## 2021-01-24 NOTE — PLAN OF CARE
Problem: Prexisting or High Potential for Compromised Skin Integrity  Goal: Skin integrity is maintained or improved  Description: INTERVENTIONS:  - Identify patients at risk for skin breakdown  - Assess and monitor skin integrity  - Assess and monitor nutrition and hydration status  - Monitor labs   - Assess for incontinence   - Turn and reposition patient  - Assist with mobility/ambulation  - Relieve pressure over bony prominences  - Avoid friction and shearing  - Provide appropriate hygiene as needed including keeping skin clean and dry  - Evaluate need for skin moisturizer/barrier cream  - Collaborate with interdisciplinary team   - Patient/family teaching  - Consider wound care consult   Outcome: Progressing     Problem: Potential for Falls  Goal: Patient will remain free of falls  Description: INTERVENTIONS:  - Assess patient frequently for physical needs  -  Identify cognitive and physical deficits and behaviors that affect risk of falls  -  [place] fall precautions as indicated by assessment   - Educate patient/family on patient safety including physical limitations  - Instruct patient to call for assistance with activity based on assessment  - Modify environment to reduce risk of injury  - Consider OT/PT consult to assist with strengthening/mobility  Outcome: Progressing     Problem: Nutrition/Hydration-ADULT  Goal: Nutrient/Hydration intake appropriate for improving, restoring or maintaining nutritional needs  Description: Monitor and assess patient's nutrition/hydration status for malnutrition  Collaborate with interdisciplinary team and initiate plan and interventions as ordered  Monitor patient's weight and dietary intake as ordered or per policy  Utilize nutrition screening tool and intervene as necessary  Determine patient's food preferences and provide high-protein, high-caloric foods as appropriate       INTERVENTIONS:  - Monitor oral intake, urinary output, labs, and treatment plans  - Assess nutrition and hydration status and recommend course of action  - Evaluate amount of meals eaten  - Assist patient with eating if necessary   - Allow adequate time for meals  - Recommend/ encourage appropriate diets, oral nutritional supplements, and vitamin/mineral supplements  - Order, calculate, and assess calorie counts as needed  - Recommend, monitor, and adjust tube feedings and TPN/PPN based on assessed needs  - Assess need for intravenous fluids  - Provide specific nutrition/hydration education as appropriate  - Include patient/family/caregiver in decisions related to nutrition  Outcome: Progressing     Problem: PAIN - ADULT  Goal: Verbalizes/displays adequate comfort level or baseline comfort level  Description: Interventions:  - Encourage patient to monitor pain and request assistance  - Assess pain using appropriate pain scale  - Administer analgesics based on type and severity of pain and evaluate response  - Implement non-pharmacological measures as appropriate and evaluate response  - Consider cultural and social influences on pain and pain management  - Notify physician/advanced practitioner if interventions unsuccessful or patient reports new pain  Outcome: Progressing     Problem: INFECTION - ADULT  Goal: Absence or prevention of progression during hospitalization  Description: INTERVENTIONS:  - Assess and monitor for signs and symptoms of infection  - Monitor lab/diagnostic results  - Monitor all insertion sites, i e  indwelling lines, tubes, and drains  - Monitor endotracheal if appropriate and nasal secretions for changes in amount and color  - [place] appropriate cooling/warming therapies per order  - Administer medications as ordered  - Instruct and encourage patient and family to use good hand hygiene technique  - Identify and instruct in appropriate isolation precautions for identified infection/condition  Outcome: Progressing  Goal: Absence of fever/infection during neutropenic period  Description: INTERVENTIONS:  - Monitor WBC    Outcome: Progressing     Problem: SAFETY ADULT  Goal: Patient will remain free of falls  Description: INTERVENTIONS:  - Assess patient frequently for physical needs  -  Identify cognitive and physical deficits and behaviors that affect risk of falls    -  [place] fall precautions as indicated by assessment   - Educate patient/family on patient safety including physical limitations  - Instruct patient to call for assistance with activity based on assessment  - Modify environment to reduce risk of injury  - Consider OT/PT consult to assist with strengthening/mobility  Outcome: Progressing  Goal: Maintain or return to baseline ADL function  Description: INTERVENTIONS:  -  Assess patient's ability to carry out ADLs; assess patient's baseline for ADL function and identify physical deficits which impact ability to perform ADLs (bathing, care of mouth/teeth, toileting, grooming, dressing, etc )  - Assess/evaluate cause of self-care deficits   - Assess range of motion  - Assess patient's mobility; develop plan if impaired  - Assess patient's need for assistive devices and provide as appropriate  - Encourage maximum independence but intervene and supervise when necessary  - Involve family in performance of ADLs  - Assess for home care needs following discharge   - Consider OT consult to assist with ADL evaluation and planning for discharge  - Provide patient education as appropriate  Outcome: Progressing  Goal: Maintain or return mobility status to optimal level  Description: INTERVENTIONS:  - Assess patient's baseline mobility status (ambulation, transfers, stairs, etc )    - Identify cognitive and physical deficits and behaviors that affect mobility  - Identify mobility aids required to assist with transfers and/or ambulation (gait belt, sit-to-stand, lift, walker, cane, etc )  - [place] fall precautions as indicated by assessment  - Record patient progress and toleration of activity level on Mobility SBAR; progress patient to next Phase/Stage  - Instruct patient to call for assistance with activity based on assessment  - Consider rehabilitation consult to assist with strengthening/weightbearing, etc   Outcome: Progressing     Problem: DISCHARGE PLANNING  Goal: Discharge to home or other facility with appropriate resources  Description: INTERVENTIONS:  - Identify barriers to discharge w/patient and caregiver  - Arrange for needed discharge resources and transportation as appropriate  - Identify discharge learning needs (meds, wound care, etc )  - Arrange for interpretive services to assist at discharge as needed  - Refer to Case Management Department for coordinating discharge planning if the patient needs post-hospital services based on physician/advanced practitioner order or complex needs related to functional status, cognitive ability, or social support system  Outcome: Progressing     Problem: Knowledge Deficit  Goal: Patient/family/caregiver demonstrates understanding of disease process, treatment plan, medications, and discharge instructions  Description: Complete learning assessment and assess knowledge base    Interventions:  - Provide teaching at level of understanding  - Provide teaching via preferred learning methods  Outcome: Progressing

## 2021-01-24 NOTE — RESPIRATORY THERAPY NOTE
RT Ventilator Management Note  Michelle Arguello 56 y o  male MRN: [id number]  Unit/Bed#: [facility] 11 Encounter: [id number]      Daily Screen       1/22/2021  1509 1/23/2021  0810          Patient safety screen outcome[de-identified]  Failed  Failed      Not Ready for Weaning due to[de-identified]  Underline problem not resolved  Underline problem not resolved              Physical Exam:   Assessment Type: Pre-treatment  General Appearance: Sedated  Respiratory Pattern: Assisted  Chest Assessment: Chest expansion symmetrical  Bilateral Breath Sounds: Coarse  Suction: ET Tube      Resp Comments: Pt continues on ASV settings  No changes made at this time

## 2021-01-24 NOTE — QUICK NOTE
Called pt's nephew, Justine Richards  Provided update on pt's care  Answered all questions to satisfaction  Requested that we set up a video phone call btwn pt and family tmrw 1/25  Will let RN know  Expressed much appreciation

## 2021-01-24 NOTE — PROGRESS NOTES
Daily Progress Note - Angeline theodore Carmen Moss [address] 56 y o  male MRN: [id number]  Unit/Bed#: MICU 11 Encounter: [id number]        ----------------------------------------------------------------------------------------  HPI/24hr events: T 100 7F  resolved  On cefe/vanc  Another fever this morning 101 3F    ---------------------------------------------------------------------------------------  SUBJECTIVE  Interactive, awake, following commands  No complaints other than some tenderness with suprapubic palpation  Denies uncomfortable fevers/ chills  Review of Systems   Constitutional: Positive for fatigue and fever  Eyes: Negative  Gastrointestinal: Negative  All other systems reviewed and are negative  ROS otherwise negative for remaining organ systems except per hpi   ---------------------------------------------------------------------------------------  Assessment and Plan:     Neuro:   · Diagnosis: Toxic Metabolic Encephalopathy, Sedation, and Pain Control  ? Plan: Mental status has been slowly improving  Patient was alert and able to follow commands appropriately  ? Current sedation regimen Dilaudid 1 75, Precedex 1 2, dilaudid PRNs, gabapentin as below  ? Cont Gabapentin 400mg TID  Patient remains on Methadone 10mg IV Q8hrs  ? Will defer anesthesia nerve block at this time based upon the patient being a few days out from his AKA with formalization  ? Will attempt to wean patient down on sedation regimen  Goal RASS 0 to -2   ? CAM-ICU        CV:   · Diagnosis: Aortic Thrombus  ? Plan: On therapeutic Lovenox  ? Appreciate vascular surgery's input on AKA with formalization  · Diagnosis: Hx HTN  ? Plan: Holding home antihypertensives given patient has had hypotension while here in the MICU  ? PRN Levophed gtt  ? Patient has been hypotensive here in the MICU  Likely in the setting of sedation  Will continue monitoring BP and MAPs in addition to telemetry  ? Goal MAP > 65   Overnight MAPs 54-80      Pulm:  · Diagnosis: Acute Hypoxemic Respiratory Failure Secondary to COVID-19  ? Plan: Intubated on 1/12 for increased work of breathing and hypoxia  ? Current ventilator settings /6/50% saturating in the 90s  ? Attempt to wean patient as tolerated with hopes for extubation in the foreseeable future  ? CXR 1/22/2021 shows stable bilateral infiltrates with a well-placed CVC  ? Continue pulmonary hygiene  · Diagnosis: Pulmonary Embolus  ? Plan: CTA on 1/12/2021 showed single small nonocclusive pulmonary embolus in left lower region  ? Patient is anticoagulated on therapeutic Lovenox         GI:   · Diagnosis: GERD  ? Plan: Protonix BID  · Diagnosis: Prior Upper GI Bleed  ? Plan: Continue monitoring for signs of bleeding  ? Patient on PPI  ? Continue trending CBCs daily  · Diagnosis: Tube Feeding and Bowel Regimen  § Patient is on Glucerna feeding at goal   § Bowel regimen in place  No recent BMs documented  § Can increase bowel regimen if need be      :   · Diagnosis: Gross Hematuria with Urinary Tract Infection  ? Plan: UA on 1/12/2021 showed blood, no bacteria, no nitrites, and small leukocytes  ? Veras per urology   · Diagnosis: Acute Kidney Injury  ? Plan: Creatinine 0 55 today  Baseline  ANNALEE resolved  ? Continue BMP monitoring  ? I&O monitoring         F/E/N:   · Considering diuresing the patient today given wet-sounding cough  Will consider scopolamine as well for potential hypersecretion  · Continue electrolyte monitoring  · Replete electrolytes as indicated         Heme/Onc:   · Diagnosis: Anemia  ? Plan: Hemoglobin 8 1--7  5  Continue monitoring  No acute bleeding noticed  ? Patient has required units of PRBCs after AKA with formalization  Will continue to monitor         Endo:   · Diagnosis: T2DM  ? Plan: Patient initially presented with DKA which has since resolved  ? Insulin gtt  Goal GB: 140-180  ? Continue monitoring   Patient has maintained good glucose control overnight         ID:   · Diagnosis: COVID-19 Infection  ? Plan: Patient is on the severe COVID pathway  ? Completed 10 days of Decadron 0 1 mg/kg  ? Patient is on day 2 of Solu-Medrol taper  ? Continue vitamin cocktail and statin  ? Remdesavir, convalescent plasma, and Actemra not given  ? Inflammatory Markers:  § 1/22 Procalcitonin 0 75 (down from 0 82)  § 1/14  (down from 279 4)  § 1/14 Ferritin 1860 (up from 1608)  § 1/19 D-Dimer 2 54 (down from 12 77)  · Diagnosis: Leukocytosis  ? Plan: WBC count down tredning  Monitor cbc  ? Trend Fever curve  Consider non infectious etiology for fevers  ? cont Day 4 vancomycin/cefepime persistent fevers/ leukocytosis  ? Negative blood cultures X 48hrs  (1/21)  F/u MRSA and sputum contaminated  ? RIJ CVC removed and replaced 1/22  ? Continue monitoring Veras for potential for infection         MSK/Skin:   · Diagnosis: Acute Limb Ischemia with Rhabdomyolysis  ? Plan:  Duplex of the lower extremities on 1/12 revealed a right femoral deep venous thrombosis  ? Post intubation, the right lower extremity was found to be pale and pulseless  The patient was transferred to [place] for vascular surgery  ? CT scan revealed filling defect in descending aorta consistent with a thrombus  ? Patient is postop day 10 from a RLE AKA[facility]  He is postop day for from formalization of the above the knee amputation  ? Continue to turn and reposition frequently; wound care appreciated  ? Offload pressure points  ? Physical therapy and Occupational therapy consulted  ?  On therapeutic Lovenox        Disposition: Continue Critical Care   Code Status: Level 1 - Full Code    ---------------------------------------------------------------------------------------  ICU CORE MEASURES    Prophylaxis   VTE Pharmacologic Prophylaxis: Enoxaparin (Lovenox)  VTE Mechanical Prophylaxis: sequential compression device  Stress Ulcer Prophylaxis: Pantoprazole IV     ABCDE Protocol (if indicated)  Plan to perform spontaneous awakening trial today? Yes  Plan to perform spontaneous breathing trial today? Yes  Obvious barriers to extubation? No  CAM-ICU: Negative    Invasive Devices Review  Invasive Devices     Central Venous Catheter Line            CVC Central Lines 21 Triple 16cm 2 days          Arterial Line            Arterial Line 21 Radial 11 days          Drain            Urethral Catheter 18 Fr  -- days    NG/OG/Enteral Tube Orogastric 16 Fr 12 days          Airway            ETT  Oral;Hi-Lo; Cuffed 7 5 mm 12 days              Can any invasive devices be discontinued today? n/a  ---------------------------------------------------------------------------------------  OBJECTIVE    Vitals   Vitals:    21 0400 21 0500 21 0600 21 0723   BP:       Pulse: 80 72 68    Resp: 19 22 20    Temp: 99 5 °F (37 5 °C)      TempSrc: Oral      SpO2: 97% 99% 98% 98%   Weight:       Height:         Temp (24hrs), Av 9 °F (37 7 °C), Min:98 °F (36 7 °C), Max:101 8 °F (38 8 °C)  Current: Temperature: 99 5 °F (37 5 °C)  HR: 84  BP: 142/56  RR: 26  SpO2: 96%    Respiratory:  SpO2: SpO2: 96 %       Invasive/non-invasive ventilation settings   Respiratory    Lab Data (Last 4 hours)    None         O2/Vent Data (Last 4 hours)       07          Patient safety screen outcome: Failed                   Physical Exam    Laboratory and Diagnostics:  Results from last 7 days   Lab Units 21  0447 21  0434 21  0435 21  0547 21  0058 21  2153 21  1314 21  0513 21  0324  21  1702 21  0352   WBC Thousand/uL 7 27 10 63* 13 56* 17 07*  --   --   --  14 61* 19 20*  --  17 84* 18 81*   HEMOGLOBIN g/dL 7 5* 8 1* 8 9* 8 6* 8 7* 6 6* 7 0* 7 1* 8 1*   < > 6 8* 8 3*   HEMATOCRIT % 24 1* 25 5* 27 2* 26 1* 25 7* 19 8* 21 3* 21 7* 23 9*  --  21 3* 25 8*   PLATELETS Thousands/uL 149 177 193 227  --   --   --  179 167  -- 159 155   NEUTROS PCT %  --  89*  --   --   --   --   --   --   --   --   --   --    BANDS PCT %  --   --   --  3  --   --   --   --   --   --   --  2   MONOS PCT %  --  4  --   --   --   --   --   --   --   --   --   --    MONO PCT %  --   --   --  1*  --   --   --   --   --   --   --  1*    < > = values in this interval not displayed       Results from last 7 days   Lab Units 01/23/21  0434 01/22/21  0516 01/21/21  0547 01/20/21  0529 01/19/21  0334 01/18/21  1702 01/18/21  0352   SODIUM mmol/L 138 137 136 138 138 140 142   POTASSIUM mmol/L 3 9 4 1 4 0 4 4 4 5 4 5 4 7   CHLORIDE mmol/L 107 104 103 106 109* 111* 111*   CO2 mmol/L 26 27 27 27 26 26 25   ANION GAP mmol/L 5 6 6 5 3* 3* 6   BUN mg/dL 27* 25 25 24 25 22 18   CREATININE mg/dL 0 55* 0 60 0 68 0 71 0 72 0 77 0 74   CALCIUM mg/dL 7 6* 7 9* 7 7* 7 7* 7 7* 7 1* 7 6*   GLUCOSE RANDOM mg/dL 154* 154* 196* 138 199* 179* 188*   ALT U/L 52  --  46  --   --   --   --    AST U/L 43  --  59*  --   --   --   --    ALK PHOS U/L 148*  --  162*  --   --   --   --    ALBUMIN g/dL 1 3*  --  1 4*  --   --   --   --    TOTAL BILIRUBIN mg/dL 0 59  --  0 66  --   --   --   --      Results from last 7 days   Lab Units 01/23/21 0434 01/22/21  0516 01/20/21  0529 01/19/21  0334 01/18/21  0352   MAGNESIUM mg/dL 2 0  --  2 0 2 0 2 1   PHOSPHORUS mg/dL 3 3 3 4 3 7 3 4 3 3      Results from last 7 days   Lab Units 01/20/21  1314 01/20/21  0433 01/19/21  0311 01/18/21 2038 01/18/21  1412 01/18/21  0352 01/17/21 2024   PTT seconds 64* 52* 70* 66* 73* 107* 76*              ABG:  Results from last 7 days   Lab Units 01/20/21  0452 01/17/21  1204   PH ART  7 461* 7 507*   PCO2 ART mm Hg 35 9* 29 5*   PO2 ART mm Hg 62 5* 98 4   HCO3 ART mmol/L 25 0 22 9   BASE EXC ART mmol/L 1 2 0 2   ABG SOURCE   --  Line, Arterial     VBG:  Results from last 7 days   Lab Units 01/17/21  1204   ABG SOURCE  Line, Arterial     Results from last 7 days   Lab Units 01/24/21  0447 01/22/21  0606 01/21/21  1235 01/19/21  0436   PROCALCITONIN ng/ml 0 85* 0 75* 0 82* 0 96*       Micro  Results from last 7 days   Lab Units 01/23/21  0721 01/21/21  1401 01/21/21  1320   BLOOD CULTURE   --  No Growth at 48 hrs  No Growth at 48 hrs  SPUTUM CULTURE  2+ Growth of   --   --    GRAM STAIN RESULT  1+ Epithelial cells per low power field*  1+ Polys*  Rare Gram positive cocci in pairs*  Rare Yeast*  --   --        EKG: n/a  Imaging: n/a    Intake and Output  I/O       01/22 0701 - 01/23 0700 01/23 0701 - 01/24 0700 01/24 0701 - 01/25 0700    I V  (mL/kg) 970 9 (12 4) 819 8 (10 5)     NG/ 950     IV Piggyback 675 115     Feedings 987 1363     Total Intake(mL/kg) 3342 9 (42 7) 3247 8 (41 5)     Urine (mL/kg/hr) 2165 (1 2) 3720 (2)     Emesis/NG output  75     Total Output 2165 3795     Net +1177 9 -547 2                UOP: 3 7 ml/hr     Height and Weights   Height: 5' 6" (167 6 cm)  IBW: 63 8 kg  Body mass index is 27 83 kg/m²  Weight (last 2 days)     Date/Time   Weight    01/23/21 0600   78 2 (172 4)    01/22/21 0600   79 3 (174 83)                Nutrition       Diet Orders   (From admission, onward)             Start     Ordered    01/20/21 0650  Diet Enteral/Parenteral; Tube Feeding No Oral Diet; Glucerna 1 2; Continuous; 52; 200; Water; Every 6 hours  Diet effective now     Question Answer Comment   Diet Type Enteral/Parenteral    Enteral/Parenteral Tube Feeding No Oral Diet    Tube Feeding Formula: Glucerna 1 2    Bolus/Cyclic/Continuous Continuous    Tube Feeding Goal Rate (mL/hr): 52    Tube Feeding flush (mL): 200    Water Flush type: Water    Water flush frequency: Every 6 hours    RD to adjust diet per protocol? Yes        01/20/21 0649              TF Glucerna 1 2 currently running at goal of 52ml/hr        Active Medications  Scheduled Meds:  Current Facility-Administered Medications   Medication Dose Route Frequency Provider Last Rate    acetaminophen  650 mg Oral Q6H PRN Libby Carlisle, MD      acetaminophen  975 mg Oral TID Marissa Thomas PA-C      atorvastatin  40 mg Oral HS Denver Crofts, MD      bisacodyl  10 mg Rectal Daily PRN Denver Crofts, MD      cefepime  2,000 mg Intravenous Q8H Davida Duverney, MD Stopped (01/24/21 0100)    chlorhexidine  15 mL Swish & Spit Q12H [address] Denver Crofts, MD      cholecalciferol  2,000 Units Oral Daily Denver Crofts, MD      dexmedetomidine  0 1-1 2 mcg/kg/hr Intravenous Titrated Dorethea Solan, CRNP 1 2 mcg/kg/hr (01/24/21 0754)    enoxaparin  1 mg/kg Subcutaneous Q12H [address] VERONICA Zaragoza      gabapentin  400 mg Oral TID Dorethea Alba, CRNP      hydrocortisone sodium succinate  50 mg Intravenous Q6H [address] Tan Trevino PA-C      HYDROmorphone  1 75 mg/hr Intravenous Continuous DignaVERONICA Chowdhury 1 75 mg/hr (01/24/21 0502)    HYDROmorphone  1 mg Intravenous Q1H PRN Mickie Carrillo PA-C      insulin regular (HumuLIN R,NovoLIN R) infusion  0 3-21 Units/hr Intravenous Titrated Dorethea Alba, CRNP 2 Units/hr (01/24/21 0606)    levalbuterol  1 25 mg Nebulization Q6H Denver Crofts, MD      methadone  10 mg Intravenous [facility] Marissa Thomas PA-C      midazolam  1 mg Intravenous Q1H PRN Denver Crofts, MD      multivitamin-minerals  1 tablet Oral Daily Denver Crofts, MD      norepinephrine  1-30 mcg/min Intravenous Titrated Jimmie Birmingham MD 1 mcg/min (01/24/21 0430)    pantoprazole  40 mg Intravenous Q12H [address] Denver Crofts, MD      polyethylene glycol  17 g Oral Daily Denver Crofts, MD      senna  2 tablet Oral BID Denver Crofts, MD      sodium chloride  4 mL Nebulization Q6H Denver Crofts, MD      vancomycin  750 mg Intravenous Daily PRN VERONICA Ennis       Continuous Infusions:  dexmedetomidine, 0 1-1 2 mcg/kg/hr, Last Rate: 1 2 mcg/kg/hr (01/24/21 0754)  HYDROmorphone, 1 75 mg/hr, Last Rate: 1 75 mg/hr (01/24/21 8176)  insulin regular (HumuLIN R,NovoLIN R) infusion, 0 3-21 Units/hr, Last Rate: 2 Units/hr (01/24/21 0606)  norepinephrine, 1-30 mcg/min, Last Rate: 1 mcg/min (01/24/21 3340)      PRN Meds:   acetaminophen, 650 mg, Q6H PRN  bisacodyl, 10 mg, Daily PRN  HYDROmorphone, 1 mg, Q1H PRN  midazolam, 1 mg, Q1H PRN  vancomycin, 750 mg, Daily PRN        Allergies   Allergies   Allergen Reactions    Varenicline      ---------------------------------------------------------------------------------------  Advance Directive and Living Will:      Power of :    POLST:    ---------------------------------------------------------------------------------------  Care Time Delivered:   1 hour    Harleen Trammell MD      Portions of the record may have been created with voice recognition software  Occasional wrong word or "sound a like" substitutions may have occurred due to the inherent limitations of voice recognition software    Read the chart carefully and recognize, using context, where substitutions have occurred

## 2021-01-25 PROBLEM — D64.9 ANEMIA: Status: ACTIVE | Noted: 2021-01-25

## 2021-01-25 PROBLEM — E87.0 HYPERNATREMIA: Status: RESOLVED | Noted: 2021-01-11 | Resolved: 2021-01-25

## 2021-01-25 LAB
ANION GAP SERPL CALCULATED.3IONS-SCNC: 5 MMOL/L (ref 4–13)
BASE EXCESS BLDA CALC-SCNC: -2.8 MMOL/L
BODY TEMPERATURE: 99.2 DEGREES FEHRENHEIT
BUN SERPL-MCNC: 33 MG/DL (ref 5–25)
CALCIUM SERPL-MCNC: 7.5 MG/DL (ref 8.3–10.1)
CHLORIDE SERPL-SCNC: 106 MMOL/L (ref 100–108)
CO2 SERPL-SCNC: 26 MMOL/L (ref 21–32)
CREAT SERPL-MCNC: 0.6 MG/DL (ref 0.6–1.3)
CRP SERPL QL: 57.9 MG/L
D DIMER PPP FEU-MCNC: 1.36 UG/ML FEU
ERYTHROCYTE [DISTWIDTH] IN BLOOD BY AUTOMATED COUNT: 15.6 % (ref 11.6–15.1)
FERRITIN SERPL-MCNC: 995 NG/ML (ref 8–388)
GFR SERPL CREATININE-BSD FRML MDRD: 108 ML/MIN/1.73SQ M
GLUCOSE SERPL-MCNC: 130 MG/DL (ref 65–140)
GLUCOSE SERPL-MCNC: 132 MG/DL (ref 65–140)
GLUCOSE SERPL-MCNC: 138 MG/DL (ref 65–140)
GLUCOSE SERPL-MCNC: 139 MG/DL (ref 65–140)
GLUCOSE SERPL-MCNC: 157 MG/DL (ref 65–140)
GLUCOSE SERPL-MCNC: 173 MG/DL (ref 65–140)
GLUCOSE SERPL-MCNC: 179 MG/DL (ref 65–140)
GLUCOSE SERPL-MCNC: 184 MG/DL (ref 65–140)
GLUCOSE SERPL-MCNC: 190 MG/DL (ref 65–140)
GLUCOSE SERPL-MCNC: 191 MG/DL (ref 65–140)
GLUCOSE SERPL-MCNC: 195 MG/DL (ref 65–140)
GLUCOSE SERPL-MCNC: 198 MG/DL (ref 65–140)
GLUCOSE SERPL-MCNC: 230 MG/DL (ref 65–140)
GLUCOSE SERPL-MCNC: 272 MG/DL (ref 65–140)
GLUCOSE SERPL-MCNC: 38 MG/DL (ref 65–140)
GLUCOSE SERPL-MCNC: 39 MG/DL (ref 65–140)
GLUCOSE SERPL-MCNC: 39 MG/DL (ref 65–140)
GLUCOSE SERPL-MCNC: 40 MG/DL (ref 65–140)
GLUCOSE SERPL-MCNC: 72 MG/DL (ref 65–140)
HCO3 BLDA-SCNC: 20.9 MMOL/L (ref 22–28)
HCT VFR BLD AUTO: 23.7 % (ref 36.5–49.3)
HGB BLD-MCNC: 7.5 G/DL (ref 12–17)
HOROWITZ INDEX BLDA+IHG-RTO: 45 MM[HG]
MAGNESIUM SERPL-MCNC: 2 MG/DL (ref 1.6–2.6)
MCH RBC QN AUTO: 29.9 PG (ref 26.8–34.3)
MCHC RBC AUTO-ENTMCNC: 31.6 G/DL (ref 31.4–37.4)
MCV RBC AUTO: 94 FL (ref 82–98)
O2 CT BLDA-SCNC: 11.3 ML/DL (ref 16–23)
OXYHGB MFR BLDA: 95.5 % (ref 94–97)
PCO2 BLDA: 31.6 MM HG (ref 36–44)
PEEP RESPIRATORY: 6 CM[H2O]
PH BLDA: 7.44 [PH] (ref 7.35–7.45)
PHOSPHATE SERPL-MCNC: 2.7 MG/DL (ref 2.3–4.1)
PLATELET # BLD AUTO: 147 THOUSANDS/UL (ref 149–390)
PMV BLD AUTO: 11.4 FL (ref 8.9–12.7)
PO2 BLDA: 90.4 MM HG (ref 75–129)
POTASSIUM SERPL-SCNC: 3.5 MMOL/L (ref 3.5–5.3)
PROCALCITONIN SERPL-MCNC: 0.57 NG/ML
RBC # BLD AUTO: 2.51 MILLION/UL (ref 3.88–5.62)
SODIUM SERPL-SCNC: 137 MMOL/L (ref 136–145)
SPECIMEN SOURCE: ABNORMAL
VENT- AC: AC
WBC # BLD AUTO: 8.37 THOUSAND/UL (ref 4.31–10.16)

## 2021-01-25 PROCEDURE — 94003 VENT MGMT INPAT SUBQ DAY: CPT

## 2021-01-25 PROCEDURE — 85379 FIBRIN DEGRADATION QUANT: CPT | Performed by: PHYSICIAN ASSISTANT

## 2021-01-25 PROCEDURE — 85027 COMPLETE CBC AUTOMATED: CPT | Performed by: PHYSICIAN ASSISTANT

## 2021-01-25 PROCEDURE — 84100 ASSAY OF PHOSPHORUS: CPT | Performed by: PHYSICIAN ASSISTANT

## 2021-01-25 PROCEDURE — 99232 SBSQ HOSP IP/OBS MODERATE 35: CPT | Performed by: PHYSICIAN ASSISTANT

## 2021-01-25 PROCEDURE — 94640 AIRWAY INHALATION TREATMENT: CPT

## 2021-01-25 PROCEDURE — 84145 PROCALCITONIN (PCT): CPT | Performed by: PHYSICIAN ASSISTANT

## 2021-01-25 PROCEDURE — C9113 INJ PANTOPRAZOLE SODIUM, VIA: HCPCS | Performed by: SURGERY

## 2021-01-25 PROCEDURE — 82948 REAGENT STRIP/BLOOD GLUCOSE: CPT

## 2021-01-25 PROCEDURE — 82728 ASSAY OF FERRITIN: CPT | Performed by: PHYSICIAN ASSISTANT

## 2021-01-25 PROCEDURE — 94760 N-INVAS EAR/PLS OXIMETRY 1: CPT

## 2021-01-25 PROCEDURE — 82805 BLOOD GASES W/O2 SATURATION: CPT | Performed by: EMERGENCY MEDICINE

## 2021-01-25 PROCEDURE — 83735 ASSAY OF MAGNESIUM: CPT | Performed by: PHYSICIAN ASSISTANT

## 2021-01-25 PROCEDURE — 86140 C-REACTIVE PROTEIN: CPT | Performed by: PHYSICIAN ASSISTANT

## 2021-01-25 PROCEDURE — 80048 BASIC METABOLIC PNL TOTAL CA: CPT | Performed by: PHYSICIAN ASSISTANT

## 2021-01-25 PROCEDURE — 99291 CRITICAL CARE FIRST HOUR: CPT | Performed by: INTERNAL MEDICINE

## 2021-01-25 RX ORDER — SODIUM CHLORIDE FOR INHALATION 0.9 %
3 VIAL, NEBULIZER (ML) INHALATION
Status: DISCONTINUED | OUTPATIENT
Start: 2021-01-25 | End: 2021-02-05

## 2021-01-25 RX ORDER — DEXTROSE MONOHYDRATE 25 G/50ML
25 INJECTION, SOLUTION INTRAVENOUS ONCE
Status: COMPLETED | OUTPATIENT
Start: 2021-01-25 | End: 2021-01-25

## 2021-01-25 RX ORDER — DEXTROSE, SODIUM CHLORIDE, SODIUM LACTATE, POTASSIUM CHLORIDE, AND CALCIUM CHLORIDE 5; .6; .31; .03; .02 G/100ML; G/100ML; G/100ML; G/100ML; G/100ML
50 INJECTION, SOLUTION INTRAVENOUS CONTINUOUS
Status: DISCONTINUED | OUTPATIENT
Start: 2021-01-25 | End: 2021-01-26

## 2021-01-25 RX ORDER — DEXTROSE MONOHYDRATE 25 G/50ML
50 INJECTION, SOLUTION INTRAVENOUS ONCE
Status: COMPLETED | OUTPATIENT
Start: 2021-01-25 | End: 2021-01-25

## 2021-01-25 RX ORDER — DEXTROSE MONOHYDRATE 25 G/50ML
INJECTION, SOLUTION INTRAVENOUS
Status: COMPLETED
Start: 2021-01-25 | End: 2021-01-25

## 2021-01-25 RX ORDER — LEVALBUTEROL 1.25 MG/.5ML
1.25 SOLUTION, CONCENTRATE RESPIRATORY (INHALATION)
Status: DISCONTINUED | OUTPATIENT
Start: 2021-01-25 | End: 2021-02-05

## 2021-01-25 RX ADMIN — Medication 1 TABLET: at 08:54

## 2021-01-25 RX ADMIN — ACETAMINOPHEN 975 MG: 325 TABLET, FILM COATED ORAL at 08:54

## 2021-01-25 RX ADMIN — HYDROCORTISONE SODIUM SUCCINATE 50 MG: 100 INJECTION, POWDER, FOR SOLUTION INTRAMUSCULAR; INTRAVENOUS at 06:15

## 2021-01-25 RX ADMIN — SODIUM CHLORIDE 1.2 MCG/KG/HR: 9 INJECTION, SOLUTION INTRAVENOUS at 03:53

## 2021-01-25 RX ADMIN — DEXTROSE MONOHYDRATE 50 ML: 500 INJECTION PARENTERAL at 16:33

## 2021-01-25 RX ADMIN — POLYETHYLENE GLYCOL 3350 17 G: 17 POWDER, FOR SOLUTION ORAL at 08:55

## 2021-01-25 RX ADMIN — VANCOMYCIN HYDROCHLORIDE 1500 MG: 10 INJECTION, POWDER, LYOPHILIZED, FOR SOLUTION INTRAVENOUS at 13:56

## 2021-01-25 RX ADMIN — HYDROCORTISONE SODIUM SUCCINATE 50 MG: 100 INJECTION, POWDER, FOR SOLUTION INTRAMUSCULAR; INTRAVENOUS at 18:27

## 2021-01-25 RX ADMIN — CEFEPIME HYDROCHLORIDE 2000 MG: 2 INJECTION, POWDER, FOR SOLUTION INTRAVENOUS at 00:24

## 2021-01-25 RX ADMIN — SODIUM CHLORIDE 1.2 MCG/KG/HR: 9 INJECTION, SOLUTION INTRAVENOUS at 09:04

## 2021-01-25 RX ADMIN — CEFEPIME HYDROCHLORIDE 2000 MG: 2 INJECTION, POWDER, FOR SOLUTION INTRAVENOUS at 08:35

## 2021-01-25 RX ADMIN — SODIUM CHLORIDE SOLN NEBU 3% 4 ML: 3 NEBU SOLN at 07:19

## 2021-01-25 RX ADMIN — DEXTROSE MONOHYDRATE 50 ML: 25 INJECTION, SOLUTION INTRAVENOUS at 08:30

## 2021-01-25 RX ADMIN — DEXTROSE MONOHYDRATE 25 ML: 25 INJECTION, SOLUTION INTRAVENOUS at 02:21

## 2021-01-25 RX ADMIN — Medication 2000 UNITS: at 08:54

## 2021-01-25 RX ADMIN — DEXTROSE MONOHYDRATE 50 ML: 500 INJECTION PARENTERAL at 08:30

## 2021-01-25 RX ADMIN — LEVALBUTEROL HYDROCHLORIDE 1.25 MG: 1.25 SOLUTION, CONCENTRATE RESPIRATORY (INHALATION) at 19:43

## 2021-01-25 RX ADMIN — SODIUM CHLORIDE 9 UNITS/HR: 9 INJECTION, SOLUTION INTRAVENOUS at 04:38

## 2021-01-25 RX ADMIN — GABAPENTIN 400 MG: 400 CAPSULE ORAL at 08:54

## 2021-01-25 RX ADMIN — ISODIUM CHLORIDE 3 ML: 0.03 SOLUTION RESPIRATORY (INHALATION) at 19:43

## 2021-01-25 RX ADMIN — LEVALBUTEROL HYDROCHLORIDE 1.25 MG: 1.25 SOLUTION, CONCENTRATE RESPIRATORY (INHALATION) at 00:06

## 2021-01-25 RX ADMIN — METHADONE HYDROCHLORIDE 10 MG: 10 INJECTION, SOLUTION INTRAMUSCULAR; INTRAVENOUS; SUBCUTANEOUS at 06:15

## 2021-01-25 RX ADMIN — SODIUM CHLORIDE SOLN NEBU 3% 4 ML: 3 NEBU SOLN at 00:06

## 2021-01-25 RX ADMIN — SODIUM CHLORIDE SOLN NEBU 3% 4 ML: 3 NEBU SOLN at 13:28

## 2021-01-25 RX ADMIN — METHADONE HYDROCHLORIDE 10 MG: 10 INJECTION, SOLUTION INTRAMUSCULAR; INTRAVENOUS; SUBCUTANEOUS at 13:01

## 2021-01-25 RX ADMIN — LEVALBUTEROL HYDROCHLORIDE 1.25 MG: 1.25 SOLUTION, CONCENTRATE RESPIRATORY (INHALATION) at 13:28

## 2021-01-25 RX ADMIN — HYDROCORTISONE SODIUM SUCCINATE 50 MG: 100 INJECTION, POWDER, FOR SOLUTION INTRAMUSCULAR; INTRAVENOUS at 12:58

## 2021-01-25 RX ADMIN — LEVALBUTEROL HYDROCHLORIDE 1.25 MG: 1.25 SOLUTION, CONCENTRATE RESPIRATORY (INHALATION) at 07:19

## 2021-01-25 RX ADMIN — HYDROMORPHONE HYDROCHLORIDE 1 MG: 1 INJECTION, SOLUTION INTRAMUSCULAR; INTRAVENOUS; SUBCUTANEOUS at 04:26

## 2021-01-25 RX ADMIN — ENOXAPARIN SODIUM 80 MG: 80 INJECTION SUBCUTANEOUS at 00:11

## 2021-01-25 RX ADMIN — DEXTROSE MONOHYDRATE 50 ML: 25 INJECTION, SOLUTION INTRAVENOUS at 16:33

## 2021-01-25 RX ADMIN — HYDROCORTISONE SODIUM SUCCINATE 50 MG: 100 INJECTION, POWDER, FOR SOLUTION INTRAMUSCULAR; INTRAVENOUS at 00:11

## 2021-01-25 RX ADMIN — CEFEPIME HYDROCHLORIDE 2000 MG: 2 INJECTION, POWDER, FOR SOLUTION INTRAVENOUS at 16:00

## 2021-01-25 RX ADMIN — ENOXAPARIN SODIUM 80 MG: 80 INJECTION SUBCUTANEOUS at 12:58

## 2021-01-25 RX ADMIN — DEXTROSE MONOHYDRATE 25 ML: 500 INJECTION PARENTERAL at 02:21

## 2021-01-25 RX ADMIN — METHADONE HYDROCHLORIDE 10 MG: 10 INJECTION, SOLUTION INTRAMUSCULAR; INTRAVENOUS; SUBCUTANEOUS at 22:00

## 2021-01-25 RX ADMIN — SENNOSIDES 17.2 MG: 8.6 TABLET, FILM COATED ORAL at 08:54

## 2021-01-25 RX ADMIN — CHLORHEXIDINE GLUCONATE 0.12% ORAL RINSE 15 ML: 1.2 LIQUID ORAL at 08:55

## 2021-01-25 RX ADMIN — PANTOPRAZOLE SODIUM 40 MG: 40 INJECTION, POWDER, FOR SOLUTION INTRAVENOUS at 20:24

## 2021-01-25 RX ADMIN — DEXTROSE, SODIUM CHLORIDE, SODIUM LACTATE, POTASSIUM CHLORIDE, AND CALCIUM CHLORIDE 40 ML/HR: 5; .6; .31; .03; .02 INJECTION, SOLUTION INTRAVENOUS at 08:35

## 2021-01-25 RX ADMIN — PANTOPRAZOLE SODIUM 40 MG: 40 INJECTION, POWDER, FOR SOLUTION INTRAVENOUS at 08:55

## 2021-01-25 NOTE — RESPIRATORY THERAPY NOTE
RT Ventilator Management Note  Codi Lockwood 56 y o  male MRN: [id number]  Unit/Bed#: [facility] 11 Encounter: [id number]      Daily Screen       1/25/2021  0721 1/25/2021  1136          Patient safety screen outcome[de-identified]  Failed  Failed      Not Ready for Weaning due to[de-identified]  Alternative forms of ventilation  Alternative forms of ventilation              Physical Exam:   Assessment Type: Assess only  General Appearance: Sleeping  Respiratory Pattern: Assisted  Chest Assessment: Chest expansion symmetrical  Bilateral Breath Sounds: Coarse  Suction: ET Tube      Resp Comments: patient resting on ASV settings, no changes at this time, will continue to monitor

## 2021-01-25 NOTE — RESPIRATORY THERAPY NOTE
RT Ventilator Management Note  Criselda Maguire 56 y o  male MRN: [id number]  Unit/Bed#: [facility] 11 Encounter: [id number]      Daily Screen       1/24/2021 0723 1/25/2021  0721          Patient safety screen outcome[de-identified]  Failed  Failed      Not Ready for Weaning due to[de-identified]    Alternative forms of ventilation              Physical Exam:   Assessment Type: Pre-treatment  General Appearance: Sleeping  Respiratory Pattern: Assisted  Chest Assessment: Chest expansion symmetrical  Bilateral Breath Sounds: Coarse  Suction: ET Tube      Resp Comments: Pt resting on ASV settings  Decreased pt 120%, pt tolerating this change well  Will continue to monitor

## 2021-01-25 NOTE — UTILIZATION REVIEW
Continued Stay Review     Date:   1/23                           Current Patient Class:  IP                Current Level of Care: critical      Admitted to outside hospital on 1/11 w/COVID  19 PNU, DKA and hematemesis (OG tube showed no evidence of blood/ hgb stabilized)  Hematuria (urology performed cysto, cleared clots, rec 3 way catheter w/irrigation prn be left for 7 days)    On 1/12 Intubated, CT chest showed left lower lobe PE, with generalized GGO  Right femoral DVT discovered on duplex of lower extremities  Right foot was noted to be cool to touch  1/13  started on UFH gtt , transferred to [facility] for  Vascular surgery to evaluate dvt - higher level of care         HPI:60 y o  male initially  Admitted to critical level of care  On 1/13  for  Management of  Acute limb ischemia RLE, thought to be 2/2 emboli from aortic thrombus to this limb  Arrived intubated/sedated, on mechanical ventilation,  UFH gtt, rising inflammatory markers,  IV ceftriaxone,  protonix gtt,  Isolate and albumin for resuscitation     1/13 performed   EMBOLECTOMY/THROMBECTOMY LOWER EXTREMITY (Right)  ARTERIOGRAM (Right)     1/18  Completion right above knee amputation     1/23  POD 10/5  Remains on mechanical ventilation w/sedation  Encephalopathy improving  Fever- unclear etiology (dcd central line on 1/21 and placed new one, cultures pending, broad spectrum antibiotics) ? AI     PLAN:  wean propofol and start precedex gtt , wean dilaudid gtt, cont gabapentin 400 mg po tid  Cont  tylenol 975 tid  Cont heparin gtt, protonix IV BID,  Tube feeds glucerna  cont covid vitamins   Cont cefepime (d3) and vancomycin (d3), repeat procal tomorrow, mrsa swab  cont insulin infusion, taper solumedrol by 10 mg every 2-3 days   Start solucortef 50 mg q6 hours       Key labs   ddimer 12 77--> 2 54, wbc 12 56, procal 0 75   Sputum gram stain: rare gram positive cocc in pair, rare yeast   Random cortisol 4         Pertinent Labs/Diagnostic Results:   Venous duplex on 01/12 revealed acute nonocclusive deep vein thrombosis of the right lower extremity   CTA on 01/12 chest abdomen pelvis revealed pulmonary emboli                        Results from last 7 days   Lab Units 01/23/21  0434 01/22/21  0435 01/21/21  0547 01/21/21  0058 01/20/21  2153   01/18/21  0352   WBC Thousand/uL 10 63* 13 56* 17 07*  --   --    < > 18 81*   HEMOGLOBIN g/dL 8 1* 8 9* 8 6* 8 7* 6 6*   < > 8 3*   HEMATOCRIT % 25 5* 27 2* 26 1* 25 7* 19 8*   < > 25 8*   PLATELETS Thousands/uL 177 193 227  --   --    < > 155   NEUTROS ABS Thousands/µL 9 39*  --   --   --   --   --   --    BANDS PCT %  --   --  3  --   --   --  2    < > = values in this interval not displayed                          Results from last 7 days   Lab Units 01/23/21  0434 01/22/21  0516 01/22/21  0439 01/21/21  0547 01/20/21  0529 01/20/21  0452 01/19/21  0334 01/19/21  0333   01/18/21  0352 01/17/21  0645   SODIUM mmol/L 138 137  --  136 138  --  138  --    < > 142 143   POTASSIUM mmol/L 3 9 4 1  --  4 0 4 4  --  4 5  --    < > 4 7 4 1   CHLORIDE mmol/L 107 104  --  103 106  --  109*  --    < > 111* 113*   CO2 mmol/L 26 27  --  27 27  --  26  --    < > 25 24   ANION GAP mmol/L 5 6  --  6 5  --  3*  --    < > 6 6   BUN mg/dL 27* 25  --  25 24  --  25  --    < > 18 20   CREATININE mg/dL 0 55* 0 60  --  0 68 0 71  --  0 72  --    < > 0 74 0 80   EGFR ml/min/1 73sq m 112 108  --  102 101  --  100  --    < > 99 96   CALCIUM mg/dL 7 6* 7 9*  --  7 7* 7 7*  --  7 7*  --    < > 7 6* 7 5*   CALCIUM, IONIZED mmol/L 1 06*  --  1 03* 0 99*  --  1 08*  --  1 07*   < >  --   --    MAGNESIUM mg/dL 2 0  --   --   --  2 0  --  2 0  --   --  2 1 1 8   PHOSPHORUS mg/dL 3 3 3 4  --   --  3 7  --  3 4  --   --  3 3 1 9*    < > = values in this interval not displayed              Results from last 7 days   Lab Units 01/23/21  0434 01/21/21  0547 01/18/21  0352   AST U/L 43 59*  --    ALT U/L 52 46  --    ALK PHOS U/L 148* 162*  -- TOTAL PROTEIN g/dL 6 6 6 4  --    ALBUMIN g/dL 1 3* 1 4*  --    TOTAL BILIRUBIN mg/dL 0 59 0 66  --    BILIRUBIN DIRECT mg/dL 0 26* 0 30*  --    AMMONIA umol/L  --   --  49*                      Results from last 7 days   Lab Units 01/23/21  1735 01/23/21  1552 01/23/21  1406 01/23/21  1224 01/23/21  1016 01/23/21  0836 01/23/21  0629 01/23/21  0441 01/23/21  0229 01/23/21  0021 01/23/21  0019 01/22/21  2157   POC GLUCOSE mg/dl 147* 204* 203* 130 138 162* 126 166* 165* 86 90 157*                    Results from last 7 days   Lab Units 01/23/21  0434 01/22/21  0516 01/21/21  0547 01/20/21  0529 01/19/21  0334 01/18/21  1702 01/18/21  0352 01/17/21  0645 01/17/21  0507 01/16/21  1819   GLUCOSE RANDOM mg/dL 154* 154* 196* 138 199* 179* 188* 133 159* 143*                    BETA-HYDROXYBUTYRATE   Date Value Ref Range Status   01/11/2021 6 2 (H) <0 6 mmol/L Final            Results from last 7 days   Lab Units 01/20/21  0452 01/17/21  1204   PH ART   7 461* 7 507*   PCO2 ART mm Hg 35 9* 29 5*   PO2 ART mm Hg 62 5* 98 4   HCO3 ART mmol/L 25 0 22 9   BASE EXC ART mmol/L 1 2 0 2   O2 CONTENT ART mL/dL 9 5* 12 1*   O2 HGB, ARTERIAL % 90 4* 96 6   ABG SOURCE    --  Line, Arterial                   Results from last 7 days   Lab Units 01/17/21  0507   CK TOTAL U/L 2,573*   CK MB INDEX % <1 0   CK MB ng/mL 7 2*               Results from last 7 days   Lab Units 01/19/21  0311   D-DIMER QUANTITATIVE ug/ml FEU 2 54*      Results from last 7 days   Lab Units 01/20/21  1314 01/20/21  0433 01/19/21  0311   PTT seconds 64* 52* 70*                 Results from last 7 days   Lab Units 01/22/21  0606 01/21/21  1235 01/19/21  0436   PROCALCITONIN ng/ml 0 75* 0 82* 0 96*           Results from last 7 days   Lab Units 01/23/21  0434   LIPASE u/L 99             Results from last 7 days   Lab Units 01/23/21  0721 01/21/21  1401 01/21/21  1320   BLOOD CULTURE    --  No Growth at 48 hrs  No Growth at 48 hrs     SPUTUM CULTURE   2+ Growth of --   --    GRAM STAIN RESULT   1+ Epithelial cells per low power field*  1+ Polys*  Rare Gram positive cocci in pairs*  Rare Yeast*  --   --       Vital Signs:   Date/Time   Temp   Pulse   Resp   Arterial Line BP   MAP   SpO2   FiO2 (%)   O2 Device   01/23/21 1604                  98 %         01/23/21 1600      70   14   128/48   74 mmHg   98 %         01/23/21 1500      74   23Abnormal    122/48   70 mmHg   98 %         01/23/21 1400      72   26Abnormal    116/44   68 mmHg   98 %         01/23/21 1300      84   25Abnormal    112/42   66 mmHg   98 %         01/23/21 1200   100 °F (37 8 °C)   72   23Abnormal    114/44   66 mmHg   99 %   50   Ventilator            Medications:   Scheduled Medications:  acetaminophen, 975 mg, Oral, TID  atorvastatin, 40 mg, Oral, HS  cefepime, 2,000 mg, Intravenous, Q8H  chlorhexidine, 15 mL, Swish & Spit, Q12H [facility]  cholecalciferol, 2,000 Units, Oral, Daily  enoxaparin, 1 mg/kg, Subcutaneous, Q12H [facility]  EPINEPHrine PF, , ,   furosemide, 20 mg, Intravenous, Once  gabapentin, 400 mg, Oral, TID  hydrocortisone sodium succinate, 50 mg, Intravenous, Q6H [facility]  levalbuterol, 1 25 mg, Nebulization, Q6H  methadone, 10 mg, Intravenous, Q8H [facility]  multivitamin-minerals, 1 tablet, Oral, Daily  pantoprazole, 40 mg, Intravenous, Q12H [facility]  polyethylene glycol, 17 g, Oral, Daily  senna, 2 tablet, Oral, BID  sodium chloride, 4 mL, Nebulization, Q6H        Continuous IV Infusions:  dexmedetomidine, 0 1-1 2 mcg/kg/hr, Intravenous, Titrated  HYDROmorphone, 1 75 mg/hr, Intravenous, Continuous  insulin regular (HumuLIN R,NovoLIN R) infusion, 0 3-21 Units/hr, Intravenous, Titrated  norepinephrine, 1-30 mcg/min, Intravenous, Titrated        PRN Meds:  acetaminophen, 650 mg, Oral, Q6H PRN  bisacodyl, 10 mg, Rectal, Daily PRN  HYDROmorphone, 1 mg, Intravenous, Q1H PRN  midazolam, 1 mg, Intravenous, Q1H PRN  [START ON 1/24/2021] vancomycin, 750 mg, Intravenous, Daily PRN           Discharge Plan: d     Network Utilization Review Department  ATTENTION: Please call with any questions or concerns to [phone] and carefully listen to the prompts so that you are directed to the right person  All voicemails are confidential   Aleja Yun all requests for admission clinical reviews, approved or denied determinations and any other requests to dedicated fax number below belonging to the campus where the patient is receiving treatment   List of dedicated fax numbers for the Facilities:  [address] DENIALS (Administrative/Medical Necessity) [phone]   [address] (Maternity/NICU/Pediatrics) [phone]   [address] [address] [facility] [address] (  Kana E[address]) [address] [place] [address] [address] [place] [address] [phone]

## 2021-01-25 NOTE — PROGRESS NOTES
Vancomycin IV Pharmacy-to-Dose Consultation    Amy Aj is a 56 y o  male who is currently receiving vancomycin IV with management by the Pharmacy Consult service for the treatment of other septic shock  Assessment/Plan:    The patient's chart was reviewed  Renal function is stable  There are no signs or symptoms of nephrotoxicity and/or infusion reactions documented  The following nephrotoxicity factors are present:  Medications: none at this time  Patient-Factors: advanced age    Based on today's assessment, will continue current vancomycin (Day # 4 vancomycin therapy; Day # 15 broad spectrum ABX) dosing of 1500 mg IV q24h, with a plan for trough to be drawn 1/27 at approximately 1230  We will continue to follow the patient's culture results and clinical progress daily      Thank you,  Lonny Ochoa, PharmD, [facility] 6 Pharmacist  [phone]

## 2021-01-25 NOTE — PROGRESS NOTES
UROLOGY PROGRESS NOTE   Patient Identifiers: Sherrill Torres (MRN [id number])  Date of Service: 1/25/2021    Subjective:    Awake  Resting on the ventilator  T-max 101 3°  WBC 8 37   H&H 7 5& 23 7 creatinine 0 60  Patient has  no complaints        Objective:     VITALS:    Vitals:    01/25/21 0828   BP:    Pulse: 90   Resp: (!) 27   Temp: 98 3 °F (36 8 °C)   SpO2: 96%           LABS:  Lab Results   Component Value Date    HGB 7 5 (L) 01/25/2021    HCT 23 7 (L) 01/25/2021    WBC 8 37 01/25/2021     (L) 01/25/2021   ]    Lab Results   Component Value Date    K 3 5 01/25/2021     01/25/2021    CO2 26 01/25/2021    BUN 33 (H) 01/25/2021    CREATININE 0 60 01/25/2021    CALCIUM 7 5 (L) 01/25/2021    GLUCOSE 179 (H) 01/13/2021   ]        INPATIENT MEDS:    Current Facility-Administered Medications:     acetaminophen (TYLENOL) tablet 650 mg, 650 mg, Oral, Q6H PRN, Sharon Rivera MD, 650 mg at 01/21/21 2248    acetaminophen (TYLENOL) tablet 975 mg, 975 mg, Oral, TID, Fuad Eason PA-C, 975 mg at 01/24/21 2123    atorvastatin (LIPITOR) tablet 40 mg, 40 mg, Oral, HS, Libby Hernandez MD, 40 mg at 01/24/21 2123    bisacodyl (DULCOLAX) rectal suppository 10 mg, 10 mg, Rectal, Daily PRN, Sharon Rivera MD, 10 mg at 01/21/21 2345    cefepime (MAXIPIME) 2,000 mg in dextrose 5 % 50 mL IVPB, 2,000 mg, Intravenous, Q8H, Valdemar Nice MD, Last Rate: 100 mL/hr at 01/25/21 0835, 2,000 mg at 01/25/21 0835    chlorhexidine (PERIDEX) 0 12 % oral rinse 15 mL, 15 mL, Swish & Charlton, Q12H [facility], Sharon Rivera MD, 15 mL at 01/24/21 2125    cholecalciferol (VITAMIN D3) tablet 2,000 Units, 2,000 Units, Oral, Daily, Sharon Rivera MD, 2,000 Units at 01/24/21 0918    dexmedetomidine (PRECEDEX) 400 mcg in sodium chloride 0 9 % 100 mL infusion, 0 1-1 2 mcg/kg/hr, Intravenous, Titrated, VERONICA Baird, Last Rate: 23 5 mL/hr at 01/25/21 0353, 1 2 mcg/kg/hr at 01/25/21 0353   dextrose 5 % in lactated Ringer's infusion, 40 mL/hr, Intravenous, Continuous, Di Munoz MD, Last Rate: 40 mL/hr at 01/25/21 0835, 40 mL/hr at 01/25/21 0835    enoxaparin (LOVENOX) subcutaneous injection 80 mg, 1 mg/kg, Subcutaneous, Q12H [facility], OhbisiVERONICA Del Valle, 80 mg at 01/25/21 0011    gabapentin (NEURONTIN) capsule 400 mg, 400 mg, Oral, TID, VERONICA Snyder, 400 mg at 01/24/21 2123    hydrocortisone sodium succinate (PF) (Solu-CORTEF) injection 50 mg, 50 mg, Intravenous, Q6H [facility], Tan Trevino PA-C, 50 mg at 01/25/21 0615    HYDROmorphone (DILAUDID) 50 mg in sodium chloride 0 9% 50mL drip, 1 5 mg/hr, Intravenous, Continuous, Irma Mcdermott PA-C, Last Rate: 1 5 mL/hr at 01/24/21 1910, 1 5 mg/hr at 01/24/21 1910    HYDROmorphone (DILAUDID) injection 1 mg, 1 mg, Intravenous, Q1H PRN, Tory Merchant PA-C, 1 mg at 01/25/21 0426    insulin regular (HumuLIN R,NovoLIN R) 1 Units/mL in sodium chloride 0 9 % 100 mL infusion, 0 3-21 Units/hr, Intravenous, Titrated, VERONICA Baird, Stopped at 01/25/21 0820    levalbuterol (XOPENEX) inhalation solution 1 25 mg, 1 25 mg, Nebulization, Q6H, Libby Hernandez MD, 1 25 mg at 01/25/21 0719    methadone (DOLOPHINE) injection 10 mg, 10 mg, Intravenous, Q8H [facility], Tan Trevino PA-C, 10 mg at 01/25/21 9141    midazolam (VERSED) injection 1 mg, 1 mg, Intravenous, Q1H PRN, Clayton Williamson MD, 1 mg at 01/21/21 2344    [COMPLETED] zinc sulfate (ZINCATE) capsule 220 mg, 220 mg, Oral, Daily, 220 mg at 01/18/21 0800 **FOLLOWED BY** multivitamin-minerals (CENTRUM ADULTS) tablet 1 tablet, 1 tablet, Oral, Daily, Clayton Williamson MD, 1 tablet at 01/24/21 0918    norepinephrine (LEVOPHED) 4 mg (STANDARD CONCENTRATION) IV in sodium chloride 0 9% 250 mL, 1-30 mcg/min, Intravenous, Titrated, Emile Perez MD, Stopped at 01/24/21 1405    pantoprazole (PROTONIX) injection 40 mg, 40 mg, Intravenous, Q12H [facility], Libby Gabriel Pressley MD, 40 mg at 01/24/21 2123    polyethylene glycol (MIRALAX) packet 17 g, 17 g, Oral, Daily, Sharon Rivera MD, 17 g at 01/24/21 0918    senna (SENOKOT) tablet 17 2 mg, 2 tablet, Oral, BID, Sharon Rivera MD, 17 2 mg at 01/24/21 1744    sodium chloride 3 % inhalation solution 4 mL, 4 mL, Nebulization, Q6H, Libby Hernandez MD, 4 mL at 01/25/21 0719    vancomycin (VANCOCIN) 1,500 mg in sodium chloride 0 9 % 250 mL IVPB, 1,500 mg, Intravenous, Q24H, Noé Stanley, , Stopped at 01/24/21 1543      Physical Exam:   BP (!) 103/45   Pulse 90   Temp 98 3 °F (36 8 °C) (Oral)   Resp (!) 27   Ht 5' 6" (1 676 m)   Wt 78 2 kg (172 lb 6 4 oz)   SpO2 96%   BMI 27 83 kg/m²   GEN: no acute distress    RESP: breathing comfortably with no accessory muscle use    ABD: soft, non-tender, non-distended   INCISION:    EXT: no significant peripheral edema     BRONSON: in place draining clear yellow urine  no clots and       RADIOLOGY:   None     Assessment:   1  History difficult Bronson catheter  2  Gross hematuria with bulbar stricture  3  COVID pneumonia  4  Ventilator-dependent respiratory failure  5   Metabolic encephalopathy    Plan:   -maintain Bronson catheter for now probably until extubated  -will be able to give trial of voiding prior to discharge  -please call back with any questions or concerns  -

## 2021-01-25 NOTE — PROGRESS NOTES
Daily Progress Note - Angeline theodore Carmen Moss 14[address] 56 y o  male MRN: [id number]  Unit/Bed#: MICU 11 Encounter: [id number]        ----------------------------------------------------------------------------------------  HPI/24hr events: T  101 5F overnight while on Abx     ---------------------------------------------------------------------------------------  SUBJECTIVE  Interactive, awake, following commands  Admits to b/l hand pain and generalized body aches, also abd tenderness to palpation  Denies having BM in 2 days  Denies uncomfortable fevers/ chills      Review of Systems   Constitutional: Positive for fatigue  Generalized body pain   HENT: Negative  Respiratory: Negative  Cardiovascular: Negative  Gastrointestinal: Positive for abdominal distention and abdominal pain  Musculoskeletal:        B/l Hand pain 5/10        ROS otherwise negative for remaining organ systems except per hpi     ---------------------------------------------------------------------------------------  Assessment and Plan:     Neuro:   · Diagnosis: Toxic Metabolic Encephalopathy, Sedation, and Pain Control  ? Plan: Mental status has been slowly improving  Patient was alert and able to follow commands appropriately  ? Current sedation regimen Dilaudid 1 75, Precedex 1 2, dilaudid PRNs, gabapentin as below  ? Cont Gabapentin 400mg TID  Patient remains on Methadone 10mg IV Q8hrs  ? Will defer anesthesia nerve block at this time based upon the patient being a few days out from his AKA with formalization  ? Will attempt to wean patient down on sedation regimen  Goal RASS 0 to -2   ? CAM-ICU        CV:   · Diagnosis: Aortic Thrombus  ? Plan: On therapeutic Lovenox  ? Appreciate vascular surgery's input on AKA with formalization  · Diagnosis: Hx HTN  ? Plan: Holding home antihypertensives given patient has had hypotension while here in the MICU  ? PRN Levophed gtt  ? Patient has been hypotensive here in the MICU  Likely in the setting of sedation  Will continue monitoring BP and MAPs in addition to telemetry  ? Goal MAP > 65  Overnight MAPs 54-80      Pulm:  · Diagnosis: Acute Hypoxemic Respiratory Failure Secondary to COVID-19  ? Plan: Intubated on 1/12 for increased work of breathing and hypoxia  ? Current ventilator settings /6/50% saturating in the 90s  ? Attempt to wean patient as tolerated with hopes for extubation in the foreseeable future  ? CXR 1/22/2021 shows stable bilateral infiltrates with a well-placed CVC  ? Continue pulmonary hygiene  · Diagnosis: Pulmonary Embolus  ? Plan: CTA on 1/12/2021 showed single small nonocclusive pulmonary embolus in left lower region  ? Patient is anticoagulated on therapeutic Lovenox         GI:   · Diagnosis: GERD  ? Plan: Protonix BID  · Diagnosis: Prior Upper GI Bleed  ? Plan: Continue monitoring for signs of bleeding  ? Patient on PPI  ? Continue trending CBCs daily  · Diagnosis: Tube Feeding and Bowel Regimen  § Patient is on Glucerna feeding at goal   § Bowel regimen in place  No recent BMs documented  § Can increase bowel regimen if need be      :   · Diagnosis: Gross Hematuria with Urinary Tract Infection  ? Plan: UA on 1/12/2021 showed blood, no bacteria, no nitrites, and small leukocytes  ? Veras per urology   · [address] J[address] Ruiz Kidney Injury  ? Plan: Creatinine stable  Baseline  ANNALEE resolved  ? Continue BMP monitoring  ? I&O monitoring         F/E/N:   · Considering diuresing the patient today given wet-sounding cough  Will consider scopolamine as well for potential hypersecretion  · Continue electrolyte monitoring  · Replete electrolytes as indicated         Heme/Onc:   · Diagnosis: Anemia  ? Plan: Hemoglobin 8 1--7  5  Continue monitoring  No acute bleeding noticed  ? Patient has required units of PRBCs after AKA with formalization  Will continue to monitor         Endo:   · Diagnosis: T2DM  ?  Plan: Patient initially presented with DKA which has since resolved  ? Insulin gtt  Goal GB: 140-180  ? Continue monitoring  Patient has maintained good glucose control overnight         ID:   · Diagnosis: COVID-19 Infection  ? Plan: Patient is on the severe COVID pathway  ? Completed 10 days of Decadron 0 1 mg/kg  ? Completed course of decadron taper  ? Continue vitamin cocktail and statin  ? Remdesavir, convalescent plasma, and Actemra not given  ? Inflammatory Markers:  § 1/22 Procalcitonin 0 75 (down from 0 82)  § 1/14  (down from 279 4)  § 1/14 Ferritin 1860 (up from 1608)  § 1/19 D-Dimer 2 54 (down from 12 77)  · Diagnosis: Leukocytosis  ? Plan: WBC count down tredning  Monitor cbc  ? Trend Fever curve  Consider non infectious etiology for fevers  ? cont Day 4 vancomycin/cefepime persistent fevers/ leukocytosis  ? Negative blood cultures X 48hrs  (1/21)  F/u MRSA and sputum contaminated  ? RIJ CVC removed and replaced 1/22  ? Continue monitoring Veras for potential for infection         MSK/Skin:   · Diagnosis: Acute Limb Ischemia with Rhabdomyolysis  ? Plan:  Duplex of the lower extremities on 1/12 revealed a right femoral deep venous thrombosis  ? Post intubation, the right lower extremity was found to be pale and pulseless  The patient was transferred to [place] for vascular surgery  ? CT scan revealed filling defect in descending aorta consistent with a thrombus  ? Patient is postop day 10 from a RLE AKA; amputation [facility] style   He is postop day for from formalization of the above the knee amputation  ? Continue to turn and reposition frequently; wound care appreciated  ? Offload pressure points  ? Physical therapy and Occupational therapy consulted  ?  On therapeutic Lovenox        Disposition: Continue Critical Care   Code Status: Level 1 - Full Code    ---------------------------------------------------------------------------------------  ICU CORE MEASURES    Prophylaxis   VTE Pharmacologic Prophylaxis: lovenox   VTE Mechanical Prophylaxis: scds  Stress Ulcer Prophylaxis: protonix IV     ABCDE Protocol (if indicated)  Plan to perform spontaneous awakening trial today? No  Plan to perform spontaneous breathing trial today? Yes  Obvious barriers to extubation? Yes  CAM-ICU: Negative    Invasive Devices Review  Invasive Devices     Central Venous Catheter Line            CVC Central Lines 21 Triple 16cm 3 days          Arterial Line            Arterial Line 21 Radial 12 days          Drain            Urethral Catheter 18 Fr  -- days    NG/OG/Enteral Tube Orogastric 16 Fr 13 days          Airway            ETT  Oral;Hi-Lo; Cuffed 7 5 mm 13 days              Can any invasive devices be discontinued today? No  ---------------------------------------------------------------------------------------  OBJECTIVE    Vitals   Vitals:    21 0400 21 0439 21 0500 21 0600   BP:       Pulse: 70  68 60   Resp: 18  18 19   Temp: 99 2 °F (37 3 °C)      TempSrc: Oral      SpO2: 96% 99% 100% 99%   Weight:       Height:         Temp (24hrs), Av 6 °F (38 1 °C), Min:99 2 °F (37 3 °C), Max:101 5 °F (38 6 °C)  Current: Temperature: 99 2 °F (37 3 °C)  HR: 68   BP: 140/56  RR: 19  SpO2: 96%    Respiratory:  SpO2: SpO2: 96 %       Invasive/non-invasive ventilation settings   Respiratory    Lab Data (Last 4 hours)       0436            pH, Arterial       7 438           pCO2, Arterial       31 6           pO2, Arterial       90 4           HCO3, Arterial       20 9           Base Excess, Arterial       -2 8                O2/Vent Data     None                Physical Exam  Vitals signs and nursing note reviewed  Constitutional:       General: He is not in acute distress  Appearance: He is normal weight  He is ill-appearing  HENT:      Head: Normocephalic and atraumatic        Nose: Nose normal       Mouth/Throat:      Mouth: Mucous membranes are moist       Comments: Stage 1 b/l Oral ulcers in corners of mouth   Eyes: Extraocular Movements: Extraocular movements intact  Conjunctiva/sclera: Conjunctivae normal       Pupils: Pupils are equal, round, and reactive to light  Neck:      Musculoskeletal: Normal range of motion and neck supple  Cardiovascular:      Rate and Rhythm: Normal rate and regular rhythm  Pulses: Normal pulses  Heart sounds: Normal heart sounds  No murmur  Pulmonary:      Effort: No respiratory distress  Comments: Intubated/ vent  Course sounds b/l upper lobes  Abdominal:      General: Abdomen is flat  Bowel sounds are normal  There is distension  Palpations: Abdomen is soft  Tenderness: There is abdominal tenderness  Musculoskeletal: Normal range of motion  Comments: R LE AKA, wrapped with some serosanguinous oozing   Skin:     General: Skin is warm and dry  Capillary Refill: Capillary refill takes less than 2 seconds  Neurological:      Mental Status: He is alert  Psychiatric:         Behavior: Behavior normal          Laboratory and Diagnostics:  Results from last 7 days   Lab Units 01/25/21  0436 01/24/21  0447 01/23/21  0434 01/22/21  0435 01/21/21  0547 01/21/21  0058 01/20/21  2153  01/20/21  0513 01/19/21  0324   WBC Thousand/uL 8 37 7 27 10 63* 13 56* 17 07*  --   --   --  14 61* 19 20*   HEMOGLOBIN g/dL 7 5* 7 5* 8 1* 8 9* 8 6* 8 7* 6 6*   < > 7 1* 8 1*   HEMATOCRIT % 23 7* 24 1* 25 5* 27 2* 26 1* 25 7* 19 8*   < > 21 7* 23 9*   PLATELETS Thousands/uL 147* 149 177 193 227  --   --   --  179 167   NEUTROS PCT %  --   --  89*  --   --   --   --   --   --   --    BANDS PCT %  --   --   --   --  3  --   --   --   --   --    MONOS PCT %  --   --  4  --   --   --   --   --   --   --    MONO PCT %  --   --   --   --  1*  --   --   --   --   --     < > = values in this interval not displayed       Results from last 7 days   Lab Units 01/25/21  0436 01/24/21  0447 01/23/21  0434 01/22/21  0516 01/21/21  0547 01/20/21  0529 01/19/21  0334   SODIUM mmol/L 137 139 138 137 136 138 138   POTASSIUM mmol/L 3 5 3 7 3 9 4 1 4 0 4 4 4 5   CHLORIDE mmol/L 106 107 107 104 103 106 109*   CO2 mmol/L 26 25 26 27 27 27 26   ANION GAP mmol/L 5 7 5 6 6 5 3*   BUN mg/dL 33* 32* 27* 25 25 24 25   CREATININE mg/dL 0 60 0 68 0 55* 0 60 0 68 0 71 0 72   CALCIUM mg/dL 7 5* 7 9* 7 6* 7 9* 7 7* 7 7* 7 7*   GLUCOSE RANDOM mg/dL 139 147* 154* 154* 196* 138 199*   ALT U/L  --   --  52  --  46  --   --    AST U/L  --   --  43  --  59*  --   --    ALK PHOS U/L  --   --  148*  --  162*  --   --    ALBUMIN g/dL  --   --  1 3*  --  1 4*  --   --    TOTAL BILIRUBIN mg/dL  --   --  0 59  --  0 66  --   --      Results from last 7 days   Lab Units 21  04321  0434 21  0516 21  0529 21  0334   MAGNESIUM mg/dL 2 0 2 0  --  2 0 2 0   PHOSPHORUS mg/dL 2 7 3 3 3 4 3 7 3 4      Results from last 7 days   Lab Units 21  1314 21  0433 21  0311 21  2038 21  1412   PTT seconds 64* 52* 70* 66* 73*              ABG:  Results from last 7 days   Lab Units 21   PH ART  7 438   PCO2 ART mm Hg 31 6*   PO2 ART mm Hg 90 4   HCO3 ART mmol/L 20 9*   BASE EXC ART mmol/L -2 8   ABG SOURCE  Line, Arterial     VBG:  Results from last 7 days   Lab Units 21   ABG SOURCE  Line, Arterial     Results from last 7 days   Lab Units 21  0436 21  0447 21  0606 21  1235 21  0436   PROCALCITONIN ng/ml 0 57* 0 85* 0 75* 0 82* 0 96*       Micro  Results from last 7 days   Lab Units 21  0721 21  1401 21  1320   BLOOD CULTURE   --  No Growth at 72 hrs  No Growth at 72 hrs     SPUTUM CULTURE  2+ Growth of   --   --    GRAM STAIN RESULT  1+ Epithelial cells per low power field*  1+ Polys*  Rare Gram positive cocci in pairs*  Rare Yeast*  --   --        EK/24- NSR, qtc 414  Imaging: n/a    Intake and Output  I/O       701 -  07 -  07 -  07    I V  (mL/kg) 819 8 (10 5) 1054 9 (13 5)     NG/ 1195     IV Piggyback 115 595     Feedings 1363 1183     Total Intake(mL/kg) 3247 8 (41 5) 4027 9 (51 5)     Urine (mL/kg/hr) 3720 (2) 1535 (0 8)     Emesis/NG output 75 0     Total Output 3795 1535     Net -547 2 +2492 9                UOP: 1 5L out in last 24hrs; net positive 8 9L     Height and Weights   Height: 5' 6" (167 6 cm)  IBW: 63 8 kg  Body mass index is 27 83 kg/m²  Weight (last 2 days)     Date/Time   Weight    01/23/21 0600   78 2 (172 4)                Nutrition       Diet Orders   (From admission, onward)             Start     Ordered    01/20/21 0650  Diet Enteral/Parenteral; Tube Feeding No Oral Diet; Glucerna 1 2; Continuous; 52; 200; Water; Every 6 hours  Diet effective now     Question Answer Comment   Diet Type Enteral/Parenteral    Enteral/Parenteral Tube Feeding No Oral Diet    Tube Feeding Formula: Glucerna 1 2    Bolus/Cyclic/Continuous Continuous    Tube Feeding Goal Rate (mL/hr): 52    Tube Feeding flush (mL): 200    Water Flush type: Water    Water flush frequency: Every 6 hours    RD to adjust diet per protocol? Yes        01/20/21 0649              TF currently running at 52 ml/hr with a goal of 52 ml/hr   Formula: glucerna 1 2      Active Medications  Scheduled Meds:  Current Facility-Administered Medications   Medication Dose Route Frequency Provider Last Rate    acetaminophen  650 mg Oral Q6H PRN Romel Gilman MD      acetaminophen  975 mg Oral TID Marlo Toussaint PA-C      atorvastatin  40 mg Oral HS Romel Gilman MD      bisacodyl  10 mg Rectal Daily PRN Romel Gilman MD      cefepime  2,000 mg Intravenous Q8H Mis Nolen MD Stopped (01/25/21 0100)    chlorhexidine  15 mL Swish & Spit Q12H [address] Romel Gilman MD      cholecalciferol  2,000 Units Oral Daily Romel Gilman MD      dexmedetomidine  0 1-1 2 mcg/kg/hr Intravenous Titrated Reyne Sprain, CRNP 1 2 mcg/kg/hr (01/25/21 0353)   [facility] dextrose 5% lactated ringer's  40 mL/hr Intravenous Continuous Pallavi Leon MD      enoxaparin  1 mg/kg Subcutaneous Q12H [address] VERONICA Harrison      gabapentin  400 mg Oral TID VERONICA Larios      hydrocortisone sodium succinate  50 mg Intravenous Q6H [address] Lucas Askew PA-C      HYDROmorphone  1 5 mg/hr Intravenous Continuous Grey Villa PA-C 1 5 mg/hr (01/24/21 1910)    HYDROmorphone  1 mg Intravenous Q1H PRN Jacek Jefferson PA-C      insulin regular (HumuLIN R,NovoLIN R) infusion  0 3-21 Units/hr Intravenous Titrated VERONICA Larios 16 Units/hr (01/25/21 1037)    levalbuterol  1 25 mg Nebulization Q6H Mark Rebolledo MD      methadone  10 mg Intravenous Q8H [address] [place], [place]      midazolam  1 mg Intravenous Q1H PRN Mark Rebolledo MD      multivitamin-minerals  1 tablet Oral Daily Mark Rebolledo MD      norepinephrine  1-30 mcg/min Intravenous Titrated Nilo Price MD Stopped (01/24/21 1405)    pantoprazole  40 mg Intravenous Q12H [address] Mark Rebolledo MD      polyethylene glycol  17 g Oral Daily Mark Rebolledo MD      senna  2 tablet Oral BID Mark Rebolledo MD      sodium chloride  4 mL Nebulization Q6H Mark Rebolledo MD      vancomycin  1,500 mg Intravenous Q24H Cleophus Safe, DO Stopped (01/24/21 2293)     Continuous Infusions:  dexmedetomidine, 0 1-1 2 mcg/kg/hr, Last Rate: 1 2 mcg/kg/hr (01/25/21 0353)  dextrose 5% lactated ringer's, 40 mL/hr  HYDROmorphone, 1 5 mg/hr, Last Rate: 1 5 mg/hr (01/24/21 1910)  insulin regular (HumuLIN R,NovoLIN R) infusion, 0 3-21 Units/hr, Last Rate: 16 Units/hr (01/25/21 0610)  norepinephrine, 1-30 mcg/min, Last Rate: Stopped (01/24/21 1405)      PRN Meds:   acetaminophen, 650 mg, Q6H PRN  bisacodyl, 10 mg, Daily PRN  HYDROmorphone, 1 mg, Q1H PRN  midazolam, 1 mg, Q1H PRN        Allergies   Allergies   Allergen Reactions    Varenicline ---------------------------------------------------------------------------------------  Advance Directive and Living Will:      Power of :    POLST:    ---------------------------------------------------------------------------------------  Care Time Delivered:   Oswaldo Drake MD      Portions of the record may have been created with voice recognition software  Occasional wrong word or "sound a like" substitutions may have occurred due to the inherent limitations of voice recognition software    Read the chart carefully and recognize, using context, where substitutions have occurred

## 2021-01-25 NOTE — PHYSICAL THERAPY NOTE
Physical Therapy Cancellation Note    PT orders received chart review completed  Pt is currently intubated and not appropriate to participate in skilled PT at this time, hope to extubate later today per discussion at mobility rounds  PT will follow and eval as medically appropriate       01/25/21 1100   PT Last Visit   PT Visit Date 01/25/21   Note Type   Cancel Reasons Medical status     Vera Molina, PT

## 2021-01-25 NOTE — RESPIRATORY THERAPY NOTE
RT Ventilator Management Note  Pauly Silva 56 y o  male MRN: [id number]  Unit/Bed#: [facility] 11 Encounter: [id number]      Daily Screen       1/23/2021  0810 1/24/2021  0723          Patient safety screen outcome[de-identified]  Failed  Failed      Not Ready for Weaning due to[de-identified]  Underline problem not resolved                Physical Exam:   Assessment Type: Assess only  General Appearance: Awake  Respiratory Pattern: Assisted  Chest Assessment: Chest expansion symmetrical  Bilateral Breath Sounds: Coarse  Suction: ET Tube      Resp Comments: No changes made to vent through the ngiht  Pt tolerated ASV vent settings well  No respiratory dsitress noted  Will continue to moniter pt per protocol

## 2021-01-25 NOTE — QUICK NOTE
Called pt's nephew, Justine Richards  Updated on pt's care  Answered all questions  Very appreciated of our care  Will try to set up video call with family for tmrw

## 2021-01-25 NOTE — PROGRESS NOTES
Progress Note - Acute Pain Service    James Gutierrez 56 y o  male MRN: [id number]  Unit/Bed#: MICU 11 Encounter: [id number]      Assessment:   Principal Problem:    Pneumonia due to COVID-19 virus  Active Problems:    Diabetic ketoacidosis without coma associated with type 2 diabetes mellitus ([facility] )    Acute metabolic encephalopathy    Acute kidney injury ([facility] )    Acute respiratory failure with hypoxia (HCC)    Sepsis due to COVID-19 ([facility] )    Hypernatremia    Metabolic acidosis, increased anion gap    [facility]    James Gutierrez is a 56 y o  male  with past medical history significant for hypertension, hyperlipidemia, GERD, hepatitis-C, diabetes mellitus, opioid use disorder chronically on methadone 70 mg daily who initially presented to [facility] with nausea and vomiting was had complicated hospitalization including DKA which has been treated but remains on insulin infusion, COVID leading to acute hypoxic respiratory failure requiring intubation, encephalopathy, elevation in transaminases, acute kidney injury which has resolved, pulmonary embolism, hematemesis, hematuria status post cystoscopy and catheter placement   Patient also noted to have ischemic right lower extremity for which vascular surgery was consulted who started patient on heparin infusion it was transferred to [facility] is now status post AKA on 01/14/2020   Acute limb ischemia likely in the setting of aortic thrombus   IV methadone was started in lieu of home oral methadone   Patient underwent formalization of RLE AKA on 01/18/2021  Currently on propofol and hydromorphone infusions  Ketamine d/c'd 01/19/2021  On heparin infusion  Plan:    Continue Tylenol 975 mg p o  t i d  scheduled   Continue methadone 10 mg IV q 8 hours scheduled   Suggest discontinue Dilaudid infusion following extubation   Suggest decrease Dilaudid to 1 mg IV q 4 hours p r n  breakthrough pain   Wean Precedex per Critical Care     Wean Versed per Critical Care   Continue Neurontin 400 mg p o  t i d  scheduled   Continue bowel regimen to avoid opioid induced constipation  APS will continue to follow  Please call  / [phone] or [facility] (/ between [phone] and on weekends) with questions or concerns    Pain History  Current pain location(s):  Right leg  Pain Scale:   Unable to quantify  Quality:  Unable qualify  24 hour history:  Patient remains intubated, on Dilaudid infusion at 1 5 milligrams/hour, Precedex at 1 2 mcg/kg/HR  Opioid requirement previous 24 hours:  Methadone 30 mg p o , Dilaudid 2 mg IV boluses, Dilaudid 37 5 mg by infusion      Meds/Allergies   all current active meds have been reviewed, current meds:   Current Facility-Administered Medications   Medication Dose Route Frequency    acetaminophen (TYLENOL) tablet 650 mg  650 mg Oral Q6H PRN    acetaminophen (TYLENOL) tablet 975 mg  975 mg Oral TID    atorvastatin (LIPITOR) tablet 40 mg  40 mg Oral HS    bisacodyl (DULCOLAX) rectal suppository 10 mg  10 mg Rectal Daily PRN    cefepime (MAXIPIME) 2,000 mg in dextrose 5 % 50 mL IVPB  2,000 mg Intravenous Q8H    chlorhexidine (PERIDEX) 0 12 % oral rinse 15 mL  15 mL Swish & Spit Q12H [facility]    cholecalciferol (VITAMIN D3) tablet 2,000 Units  2,000 Units Oral Daily    dexmedetomidine (PRECEDEX) 400 mcg in sodium chloride 0 9 % 100 mL infusion  0 1-1 2 mcg/kg/hr Intravenous Titrated    dextrose 5 % in lactated Ringer's infusion  40 mL/hr Intravenous Continuous    enoxaparin (LOVENOX) subcutaneous injection 80 mg  1 mg/kg Subcutaneous Q12H [facility]    gabapentin (NEURONTIN) capsule 400 mg  400 mg Oral TID    hydrocortisone sodium succinate (PF) (Solu-CORTEF) injection 50 mg  50 mg Intravenous Q6H [facility]    HYDROmorphone (DILAUDID) 50 mg in sodium chloride 0 9% 50mL drip  1 5 mg/hr Intravenous Continuous    HYDROmorphone (DILAUDID) injection 1 mg  1 mg Intravenous Q1H PRN    insulin regular (HumuLIN R,NovoLIN R) 1 Units/mL in sodium chloride 0 9 % 100 mL infusion  0 3-21 Units/hr Intravenous Titrated    levalbuterol (XOPENEX) inhalation solution 1 25 mg  1 25 mg Nebulization Q6H    methadone (DOLOPHINE) injection 10 mg  10 mg Intravenous Q8H [address]    midazolam (VERSED) injection 1 mg  1 mg Intravenous Q1H PRN    multivitamin-minerals (CENTRUM ADULTS) tablet 1 tablet  1 tablet Oral Daily    norepinephrine (LEVOPHED) 4 mg (STANDARD CONCENTRATION) IV in sodium chloride 0 9% 250 mL  1-30 mcg/min Intravenous Titrated    pantoprazole (PROTONIX) injection 40 mg  40 mg Intravenous Q12H [address]    polyethylene glycol (MIRALAX) packet 17 g  17 g Oral Daily    senna (SENOKOT) tablet 17 2 mg  2 tablet Oral BID    sodium chloride 3 % inhalation solution 4 mL  4 mL Nebulization Q6H    vancomycin (VANCOCIN) 1,500 mg in sodium chloride 0 9 % 250 mL IVPB  1,500 mg Intravenous Q24H    and PTA meds:   Prior to Admission Medications   Prescriptions Last Dose Informant Patient Reported? Taking?    Blood Glucose Monitoring Suppl (Vamshi Pendleton) w/Device KIT  Self No No   Sig: Use to test blood sugars 3 times daily   Empagliflozin (Jardiance) 25 MG TABS   No No   Sig: Take 1 tablet (25 mg total) by mouth every morning   Insulin Pen Needle 31G X 5 MM MISC  Self No No   Sig: by Does not apply route daily Pt to inject 4 X daily   Insulin Pen Needle 31G X 5 MM MISC  Self No No   Si units sq 2X daily   ergocalciferol (VITAMIN D2) 50,000 units   No No   Sig: Take 1 capsule (50,000 Units total) by mouth once a week   erythromycin (ILOTYCIN) ophthalmic ointment  Self Yes No   Si 5 inches daily at bedtime   furosemide (LASIX) 40 mg tablet  Self No No   Sig: Take 1 tablet (40 mg total) by mouth daily   Patient taking differently: Take 40 mg by mouth as needed (on off work days)    gabapentin (NEURONTIN) 100 mg capsule   No No   Sig: Take 1 capsule (100 mg total) by mouth 3 (three) times a day   glucose blood (OneTouch Verio) test strip   No No   Sig: Use to test blood sugars 3 times daily   insulin NPH-insulin regular (NovoLIN 70/30) 100 units/mL subcutaneous injection   No No   Sig: Inject 40 Units under the skin 2 (two) times a day before meals   Patient taking differently: Inject 40 Units under the skin 2 (two) times a day before meals Pt states he takes it "once in awhile"   insulin glargine (LANTUS) 100 units/mL subcutaneous injection   No No   Sig: Inject 10 Units under the skin daily at bedtime   Patient taking differently: Inject 20 Units under the skin daily at bedtime    isosorbide mononitrate (IMDUR) 120 mg 24 hr tablet   No No   Sig: Take 1 tablet (120 mg total) by mouth daily   lisinopril-hydrochlorothiazide (PRINZIDE,ZESTORETIC) 20-25 MG per tablet   No No   Sig: Take 1 tablet by mouth daily   methadone (DOLOPHINE) 10 MG/5ML solution  Self Yes No   Sig: Take 70 mg by mouth   omeprazole (PriLOSEC) 20 mg delayed release capsule   No No   Sig: Take 1 capsule (20 mg total) by mouth daily   ondansetron (ZOFRAN-ODT) 4 mg disintegrating tablet   No No   Sig: Take 1 tablet (4 mg total) by mouth every 6 (six) hours as needed for nausea or vomiting   potassium chloride (K-DUR,KLOR-CON) 20 mEq tablet  Self No No   Sig: Take 2 tablets (40 mEq total) by mouth daily   pravastatin (PRAVACHOL) 40 mg tablet   No No   Sig: Take 1 tablet (40 mg total) by mouth daily   promethazine (PHENERGAN) 25 mg tablet   No No   Sig: TAKE 1 TABLET TWICE A DAY   sitaGLIPtin-metFORMIN (JANUMET)  MG per tablet   No No   Sig: Take 1 tablet by mouth 2 (two) times a day with meals      Facility-Administered Medications: None       Allergies   Allergen Reactions    Varenicline        Objective     Temp:  [98 3 °F (36 8 °C)-101 5 °F (38 6 °C)] 98 3 °F (36 8 °C)  HR:  [60-90] 90  Resp:  [16-27] 27  Arterial Line BP: (112-156)/(40-60) 120/40  FiO2 (%):  [45] 45    Physical Exam  Vitals signs and nursing note reviewed     Constitutional:       General: He is awake  He is not in acute distress  Neurological:      Mental Status: He is alert  GCS: GCS eye subscore is 4  GCS verbal subscore is 1  GCS motor subscore is 6  Comments: Patient evaluated from the door secondary COVID  Psychiatric:         Behavior: Behavior is cooperative  Lab Results:   Results from last 7 days   Lab Units 01/25/21  0436   WBC Thousand/uL 8 37   HEMOGLOBIN g/dL 7 5*   HEMATOCRIT % 23 7*   PLATELETS Thousands/uL 147*      Results from last 7 days   Lab Units 01/25/21  0436  01/23/21  0434   POTASSIUM mmol/L 3 5   < > 3 9   CHLORIDE mmol/L 106   < > 107   CO2 mmol/L 26   < > 26   BUN mg/dL 33*   < > 27*   CREATININE mg/dL 0 60   < > 0 55*   CALCIUM mg/dL 7 5*   < > 7 6*   ALK PHOS U/L  --   --  148*   ALT U/L  --   --  52   AST U/L  --   --  43    < > = values in this interval not displayed  Imaging Studies: I have personally reviewed pertinent reports  EKG, Pathology, and Other Studies: I have personally reviewed pertinent reports  Counseling / Coordination of Care  Total floor / unit time spent today 20 minutes  Greater than 50% of total time was spent with the patient and / or family counseling and / or coordination of care  A description of the counseling / coordination of care:  Patient interview, physical examination, review of medical record, review of imaging and laboratory data, development of pain management plan, discussion of pain management plan with patient and primary service  Please note that the APS provides consultative services regarding pain management only  With the exception of ketamine and epidural infusions and except when indicated, final decisions regarding starting or changing doses of analgesic medications are at the discretion of the consulting service  Off hours consultation and/or medication management is generally not available      Elizabeth Wick PA-C  Acute Pain Service

## 2021-01-26 PROBLEM — E87.2 METABOLIC ACIDOSIS, INCREASED ANION GAP: Status: RESOLVED | Noted: 2021-01-11 | Resolved: 2021-01-26

## 2021-01-26 PROBLEM — N17.9 ACUTE KIDNEY INJURY (HCC): Status: RESOLVED | Noted: 2021-01-11 | Resolved: 2021-01-26

## 2021-01-26 PROBLEM — E87.6 HYPOKALEMIA: Status: ACTIVE | Noted: 2021-01-26

## 2021-01-26 PROBLEM — E87.29 METABOLIC ACIDOSIS, INCREASED ANION GAP: Status: RESOLVED | Noted: 2021-01-11 | Resolved: 2021-01-26

## 2021-01-26 PROBLEM — E11.9 DM2 (DIABETES MELLITUS, TYPE 2) (HCC): Status: ACTIVE | Noted: 2021-01-11

## 2021-01-26 LAB
ANION GAP SERPL CALCULATED.3IONS-SCNC: 3 MMOL/L (ref 4–13)
ANION GAP SERPL CALCULATED.3IONS-SCNC: 4 MMOL/L (ref 4–13)
ANION GAP SERPL CALCULATED.3IONS-SCNC: 6 MMOL/L (ref 4–13)
BACTERIA BLD CULT: NORMAL
BACTERIA BLD CULT: NORMAL
BUN SERPL-MCNC: 26 MG/DL (ref 5–25)
BUN SERPL-MCNC: 26 MG/DL (ref 5–25)
BUN SERPL-MCNC: 28 MG/DL (ref 5–25)
CALCIUM SERPL-MCNC: 7.5 MG/DL (ref 8.3–10.1)
CALCIUM SERPL-MCNC: 7.7 MG/DL (ref 8.3–10.1)
CALCIUM SERPL-MCNC: 7.8 MG/DL (ref 8.3–10.1)
CHLORIDE SERPL-SCNC: 109 MMOL/L (ref 100–108)
CHLORIDE SERPL-SCNC: 112 MMOL/L (ref 100–108)
CHLORIDE SERPL-SCNC: 113 MMOL/L (ref 100–108)
CO2 SERPL-SCNC: 24 MMOL/L (ref 21–32)
CO2 SERPL-SCNC: 27 MMOL/L (ref 21–32)
CO2 SERPL-SCNC: 27 MMOL/L (ref 21–32)
CREAT SERPL-MCNC: 0.52 MG/DL (ref 0.6–1.3)
CREAT SERPL-MCNC: 0.56 MG/DL (ref 0.6–1.3)
CREAT SERPL-MCNC: 0.6 MG/DL (ref 0.6–1.3)
ERYTHROCYTE [DISTWIDTH] IN BLOOD BY AUTOMATED COUNT: 16.2 % (ref 11.6–15.1)
GFR SERPL CREATININE-BSD FRML MDRD: 108 ML/MIN/1.73SQ M
GFR SERPL CREATININE-BSD FRML MDRD: 111 ML/MIN/1.73SQ M
GFR SERPL CREATININE-BSD FRML MDRD: 114 ML/MIN/1.73SQ M
GLUCOSE SERPL-MCNC: 116 MG/DL (ref 65–140)
GLUCOSE SERPL-MCNC: 137 MG/DL (ref 65–140)
GLUCOSE SERPL-MCNC: 138 MG/DL (ref 65–140)
GLUCOSE SERPL-MCNC: 148 MG/DL (ref 65–140)
GLUCOSE SERPL-MCNC: 160 MG/DL (ref 65–140)
GLUCOSE SERPL-MCNC: 161 MG/DL (ref 65–140)
GLUCOSE SERPL-MCNC: 163 MG/DL (ref 65–140)
GLUCOSE SERPL-MCNC: 167 MG/DL (ref 65–140)
GLUCOSE SERPL-MCNC: 167 MG/DL (ref 65–140)
GLUCOSE SERPL-MCNC: 168 MG/DL (ref 65–140)
GLUCOSE SERPL-MCNC: 170 MG/DL (ref 65–140)
GLUCOSE SERPL-MCNC: 172 MG/DL (ref 65–140)
GLUCOSE SERPL-MCNC: 179 MG/DL (ref 65–140)
GLUCOSE SERPL-MCNC: 191 MG/DL (ref 65–140)
GLUCOSE SERPL-MCNC: 200 MG/DL (ref 65–140)
HCT VFR BLD AUTO: 29.4 % (ref 36.5–49.3)
HGB BLD-MCNC: 9.2 G/DL (ref 12–17)
MAGNESIUM SERPL-MCNC: 2 MG/DL (ref 1.6–2.6)
MCH RBC QN AUTO: 29.6 PG (ref 26.8–34.3)
MCHC RBC AUTO-ENTMCNC: 31.3 G/DL (ref 31.4–37.4)
MCV RBC AUTO: 95 FL (ref 82–98)
MRSA NOSE QL CULT: NORMAL
PHOSPHATE SERPL-MCNC: 2.2 MG/DL (ref 2.3–4.1)
PLATELET # BLD AUTO: 209 THOUSANDS/UL (ref 149–390)
PMV BLD AUTO: 11.2 FL (ref 8.9–12.7)
POTASSIUM SERPL-SCNC: 2.7 MMOL/L (ref 3.5–5.3)
POTASSIUM SERPL-SCNC: 3 MMOL/L (ref 3.5–5.3)
POTASSIUM SERPL-SCNC: 4.3 MMOL/L (ref 3.5–5.3)
RBC # BLD AUTO: 3.11 MILLION/UL (ref 3.88–5.62)
SODIUM SERPL-SCNC: 139 MMOL/L (ref 136–145)
SODIUM SERPL-SCNC: 143 MMOL/L (ref 136–145)
SODIUM SERPL-SCNC: 143 MMOL/L (ref 136–145)
WBC # BLD AUTO: 15.38 THOUSAND/UL (ref 4.31–10.16)

## 2021-01-26 PROCEDURE — 94640 AIRWAY INHALATION TREATMENT: CPT

## 2021-01-26 PROCEDURE — C9113 INJ PANTOPRAZOLE SODIUM, VIA: HCPCS | Performed by: SURGERY

## 2021-01-26 PROCEDURE — 80048 BASIC METABOLIC PNL TOTAL CA: CPT | Performed by: STUDENT IN AN ORGANIZED HEALTH CARE EDUCATION/TRAINING PROGRAM

## 2021-01-26 PROCEDURE — 84100 ASSAY OF PHOSPHORUS: CPT | Performed by: STUDENT IN AN ORGANIZED HEALTH CARE EDUCATION/TRAINING PROGRAM

## 2021-01-26 PROCEDURE — 94760 N-INVAS EAR/PLS OXIMETRY 1: CPT

## 2021-01-26 PROCEDURE — 92610 EVALUATE SWALLOWING FUNCTION: CPT

## 2021-01-26 PROCEDURE — 99291 CRITICAL CARE FIRST HOUR: CPT | Performed by: INTERNAL MEDICINE

## 2021-01-26 PROCEDURE — 80048 BASIC METABOLIC PNL TOTAL CA: CPT | Performed by: NURSE PRACTITIONER

## 2021-01-26 PROCEDURE — 85027 COMPLETE CBC AUTOMATED: CPT | Performed by: NURSE PRACTITIONER

## 2021-01-26 PROCEDURE — 83735 ASSAY OF MAGNESIUM: CPT | Performed by: STUDENT IN AN ORGANIZED HEALTH CARE EDUCATION/TRAINING PROGRAM

## 2021-01-26 PROCEDURE — 82948 REAGENT STRIP/BLOOD GLUCOSE: CPT

## 2021-01-26 PROCEDURE — 94668 MNPJ CHEST WALL SBSQ: CPT

## 2021-01-26 RX ORDER — POTASSIUM CHLORIDE 20 MEQ/1
40 TABLET, EXTENDED RELEASE ORAL ONCE
Status: DISCONTINUED | OUTPATIENT
Start: 2021-01-26 | End: 2021-01-26

## 2021-01-26 RX ORDER — POTASSIUM CHLORIDE 29.8 MG/ML
40 INJECTION INTRAVENOUS ONCE
Status: COMPLETED | OUTPATIENT
Start: 2021-01-26 | End: 2021-01-26

## 2021-01-26 RX ORDER — POTASSIUM CHLORIDE 29.8 MG/ML
40 INJECTION INTRAVENOUS ONCE
Status: COMPLETED | OUTPATIENT
Start: 2021-01-26 | End: 2021-01-27

## 2021-01-26 RX ORDER — SCOLOPAMINE TRANSDERMAL SYSTEM 1 MG/1
1 PATCH, EXTENDED RELEASE TRANSDERMAL
Status: DISCONTINUED | OUTPATIENT
Start: 2021-01-26 | End: 2021-01-26

## 2021-01-26 RX ORDER — GLYCOPYRROLATE 1 MG/1
1 TABLET ORAL 3 TIMES DAILY
Status: DISCONTINUED | OUTPATIENT
Start: 2021-01-26 | End: 2021-02-25 | Stop reason: HOSPADM

## 2021-01-26 RX ORDER — MAGNESIUM SULFATE HEPTAHYDRATE 40 MG/ML
2 INJECTION, SOLUTION INTRAVENOUS ONCE
Status: COMPLETED | OUTPATIENT
Start: 2021-01-26 | End: 2021-01-26

## 2021-01-26 RX ORDER — LIDOCAINE HYDROCHLORIDE 20 MG/ML
15 SOLUTION OROPHARYNGEAL 4 TIMES DAILY PRN
Status: DISCONTINUED | OUTPATIENT
Start: 2021-01-26 | End: 2021-02-25 | Stop reason: HOSPADM

## 2021-01-26 RX ORDER — MUSCLE RUB CREAM 100; 150 MG/G; MG/G
CREAM TOPICAL 4 TIMES DAILY PRN
Status: DISCONTINUED | OUTPATIENT
Start: 2021-01-26 | End: 2021-02-25 | Stop reason: HOSPADM

## 2021-01-26 RX ORDER — HYDROMORPHONE HCL/PF 1 MG/ML
0.5 SYRINGE (ML) INJECTION
Status: DISCONTINUED | OUTPATIENT
Start: 2021-01-26 | End: 2021-01-27

## 2021-01-26 RX ORDER — PANTOPRAZOLE SODIUM 40 MG/1
40 INJECTION, POWDER, FOR SOLUTION INTRAVENOUS
Status: DISCONTINUED | OUTPATIENT
Start: 2021-01-27 | End: 2021-01-27

## 2021-01-26 RX ORDER — SENNOSIDES 8.6 MG
1 TABLET ORAL 2 TIMES DAILY
Status: DISCONTINUED | OUTPATIENT
Start: 2021-01-26 | End: 2021-02-25 | Stop reason: HOSPADM

## 2021-01-26 RX ADMIN — LEVALBUTEROL HYDROCHLORIDE 1.25 MG: 1.25 SOLUTION, CONCENTRATE RESPIRATORY (INHALATION) at 20:06

## 2021-01-26 RX ADMIN — LEVALBUTEROL HYDROCHLORIDE 1.25 MG: 1.25 SOLUTION, CONCENTRATE RESPIRATORY (INHALATION) at 07:48

## 2021-01-26 RX ADMIN — ENOXAPARIN SODIUM 80 MG: 80 INJECTION SUBCUTANEOUS at 11:52

## 2021-01-26 RX ADMIN — POTASSIUM CHLORIDE 40 MEQ: 29.8 INJECTION, SOLUTION INTRAVENOUS at 12:44

## 2021-01-26 RX ADMIN — METHADONE HYDROCHLORIDE 10 MG: 10 INJECTION, SOLUTION INTRAMUSCULAR; INTRAVENOUS; SUBCUTANEOUS at 13:31

## 2021-01-26 RX ADMIN — GLYCOPYRROLATE 1 MG: 1 TABLET ORAL at 20:01

## 2021-01-26 RX ADMIN — GABAPENTIN 400 MG: 400 CAPSULE ORAL at 16:18

## 2021-01-26 RX ADMIN — PANTOPRAZOLE SODIUM 40 MG: 40 INJECTION, POWDER, FOR SOLUTION INTRAVENOUS at 08:01

## 2021-01-26 RX ADMIN — METHADONE HYDROCHLORIDE 10 MG: 10 INJECTION, SOLUTION INTRAMUSCULAR; INTRAVENOUS; SUBCUTANEOUS at 05:29

## 2021-01-26 RX ADMIN — METHADONE HYDROCHLORIDE 10 MG: 10 INJECTION, SOLUTION INTRAMUSCULAR; INTRAVENOUS; SUBCUTANEOUS at 22:00

## 2021-01-26 RX ADMIN — HYDROCORTISONE SODIUM SUCCINATE 50 MG: 100 INJECTION, POWDER, FOR SOLUTION INTRAMUSCULAR; INTRAVENOUS at 17:20

## 2021-01-26 RX ADMIN — HYDROCORTISONE SODIUM SUCCINATE 50 MG: 100 INJECTION, POWDER, FOR SOLUTION INTRAMUSCULAR; INTRAVENOUS at 11:52

## 2021-01-26 RX ADMIN — GLYCOPYRROLATE 1 MG: 1 TABLET ORAL at 14:02

## 2021-01-26 RX ADMIN — DICLOFENAC SODIUM 4 G: 10 GEL TOPICAL at 22:00

## 2021-01-26 RX ADMIN — MAGNESIUM SULFATE HEPTAHYDRATE 2 G: 40 INJECTION, SOLUTION INTRAVENOUS at 08:23

## 2021-01-26 RX ADMIN — ISODIUM CHLORIDE 3 ML: 0.03 SOLUTION RESPIRATORY (INHALATION) at 07:47

## 2021-01-26 RX ADMIN — HYDROCORTISONE SODIUM SUCCINATE 50 MG: 100 INJECTION, POWDER, FOR SOLUTION INTRAMUSCULAR; INTRAVENOUS at 00:41

## 2021-01-26 RX ADMIN — ISODIUM CHLORIDE 3 ML: 0.03 SOLUTION RESPIRATORY (INHALATION) at 13:21

## 2021-01-26 RX ADMIN — LEVALBUTEROL HYDROCHLORIDE 1.25 MG: 1.25 SOLUTION, CONCENTRATE RESPIRATORY (INHALATION) at 13:21

## 2021-01-26 RX ADMIN — DICLOFENAC SODIUM 4 G: 10 GEL TOPICAL at 17:09

## 2021-01-26 RX ADMIN — ISODIUM CHLORIDE 3 ML: 0.03 SOLUTION RESPIRATORY (INHALATION) at 20:06

## 2021-01-26 RX ADMIN — SODIUM CHLORIDE 1 UNITS/HR: 9 INJECTION, SOLUTION INTRAVENOUS at 10:00

## 2021-01-26 RX ADMIN — Medication 2000 UNITS: at 13:32

## 2021-01-26 RX ADMIN — Medication 1 TABLET: at 13:32

## 2021-01-26 RX ADMIN — GABAPENTIN 400 MG: 400 CAPSULE ORAL at 20:01

## 2021-01-26 RX ADMIN — HYDROCORTISONE SODIUM SUCCINATE 50 MG: 100 INJECTION, POWDER, FOR SOLUTION INTRAMUSCULAR; INTRAVENOUS at 05:29

## 2021-01-26 RX ADMIN — POTASSIUM CHLORIDE 40 MEQ: 29.8 INJECTION, SOLUTION INTRAVENOUS at 08:24

## 2021-01-26 RX ADMIN — POTASSIUM PHOSPHATE, MONOBASIC POTASSIUM PHOSPHATE, DIBASIC 30 MMOL: 224; 236 INJECTION, SOLUTION, CONCENTRATE INTRAVENOUS at 17:23

## 2021-01-26 RX ADMIN — ENOXAPARIN SODIUM 80 MG: 80 INJECTION SUBCUTANEOUS at 00:41

## 2021-01-26 RX ADMIN — POTASSIUM CHLORIDE 40 MEQ: 29.8 INJECTION, SOLUTION INTRAVENOUS at 17:05

## 2021-01-26 NOTE — PROGRESS NOTES
Progress Note - Acute Pain Service    Sara Zamora 56 y o  male MRN: [id number]  Unit/Bed#: MICU 11 Encounter: [id number]      Assessment:   Principal Problem:    Pneumonia due to COVID-19 virus  Active Problems:    Diabetic ketoacidosis without coma associated with type 2 diabetes mellitus ([facility] )    Acute metabolic encephalopathy    [facility]    Acute respiratory failure with hypoxia (HCC)    Sepsis due to [facility])    Metabolic acidosis, increased anion gap    Aortic thrombus (HCC)    Anemia    Ben Alvarez is a 56 y  o  male with past medical history significant for hypertension, hyperlipidemia, GERD, hepatitis-C, diabetes mellitus, opioid use disorder chronically on methadone 70 mg daily who initially presented to [facility] with nausea and vomiting was had complicated hospitalization including DKA which has been treated but remains on insulin infusion, COVID leading to acute hypoxic respiratory failure requiring intubation, encephalopathy, elevation in transaminases, acute kidney injury which has resolved, pulmonary embolism, hematemesis, hematuria status post cystoscopy and catheter placement   Patient also noted to have ischemic right lower extremity for which vascular surgery was consulted who started patient on heparin infusion it was transferred to [facility] is now status post AKA on 01/14/2020   Acute limb ischemia likely in the setting of aortic thrombus   IV methadone was started in lieu of home oral methadone   Patient underwent formalization of RLE AKA on 01/18/2021  Currently on propofol and hydromorphone infusions  Ketamine d/c'd 01/19/2021  On therapeutic enoxaparin  Hydromorphone PCA discontinued on 01/25/2021      Plan:   - continue acetaminophen 650 mg p o  Q 6 hours p r n   For mild pain  - continue gabapentin 400 mg p o  T i d  Scheduled  - continue methadone 10 mg IV tid scheduled (home dose 70 mg po) - would switch to oral once tolerating diet (ie 25 mg po tid scheduled and then back to 70 mg po daily on discharge)  - start hydromorphone 0 5 mg IV q 3 hours p r n  For moderate or severe pain  - start hydromorphone 0 5 mg IV q 3 hours p r n  For breakthrough pain  - patient is on hydrocortisone per primary team  - bowel regimen with senna amrit, bisacodyl prn , polyethylene glycol amrit     APS will continue to follow  Please call  / [phone] or [email or web address] Acute Pain Service - [facility] (/ between [id number] and on weekends) with questions or concerns     Pain History  Current pain location(s): R stump  Pain Scale:   Unable to ascertain  Quality: Unable to ascertain    24 hour history: Patient examined at bedside  Patient was extubated and hydromorphone PCA was discontinued  Patient still unable to tolerate PO diet  Does refer stump pain and SOB (gives thumbs up when asked if he has these symptoms)   Unable to ascertain ROS due to nonverbal      Opioid requirement previous 24 hours:   Methadone 30 mg IV  Patient was on hydromorphone infusion at 1 5 mg per hour until 1329 on 1/25      Meds/Allergies   all current active meds have been reviewed and current meds:   Current Facility-Administered Medications   Medication Dose Route Frequency    acetaminophen (TYLENOL) tablet 650 mg  650 mg Oral Q6H PRN    bisacodyl (DULCOLAX) rectal suppository 10 mg  10 mg Rectal Daily PRN    cholecalciferol (VITAMIN D3) tablet 2,000 Units  2,000 Units Oral Daily    enoxaparin (LOVENOX) subcutaneous injection 80 mg  1 mg/kg Subcutaneous Q12H [address]    gabapentin (NEURONTIN) capsule 400 mg  400 mg Oral TID    hydrocortisone sodium succinate (PF) (Solu-CORTEF) injection 50 mg  50 mg Intravenous Q6H [address]    insulin regular (HumuLIN R,NovoLIN R) 1 Units/mL in sodium chloride 0 9 % 100 mL infusion  0 3-21 Units/hr Intravenous Titrated    levalbuterol (XOPENEX) inhalation solution 1 25 mg  1 25 mg Nebulization TID    Lidocaine Viscous HCl (XYLOCAINE) 2 % mucosal solution 15 mL  15 mL Swish & Spit 4x Daily PRN    magnesium sulfate 2 g/50 mL IVPB (premix) 2 g  2 g Intravenous Once    methadone (DOLOPHINE) injection 10 mg  10 mg Intravenous Q8H [address]    multivitamin-minerals (CENTRUM ADULTS) tablet 1 tablet  1 tablet Oral Daily    pantoprazole (PROTONIX) injection 40 mg  40 mg Intravenous Q12H [address]    polyethylene glycol (MIRALAX) packet 17 g  17 g Oral Daily    potassium chloride 40 mEq IVPB (premix)  40 mEq Intravenous Once    potassium chloride 40 mEq IVPB (premix)  40 mEq Intravenous Once    scopolamine (TRANSDERM-SCOP) 1 5 mg/3 days TD 72 hr patch 1 patch  1 patch Transdermal Q72H    senna (SENOKOT) tablet 8 6 mg  1 tablet Oral BID    sodium chloride 0 9 % inhalation solution 3 mL  3 mL Nebulization TID       Allergies   Allergen Reactions    Varenicline        Objective     Temp:  [97 8 °F (36 6 °C)-99 4 °F (37 4 °C)] 97 8 °F (36 6 °C)  HR:  [] 88  Resp:  [12-43] 15  Arterial Line BP: (104-142)/(40-64) 140/60  FiO2 (%):  [45] 45    Physical Exam  Vitals signs and nursing note reviewed  Constitutional:       Appearance: Normal appearance  He is not ill-appearing or toxic-appearing  HENT:      Head: Normocephalic and atraumatic  Eyes:      General: No scleral icterus  Conjunctiva/sclera: Conjunctivae normal    Cardiovascular:      Rate and Rhythm: Normal rate  Pulmonary:      Effort: Pulmonary effort is normal  No respiratory distress  Musculoskeletal:         General: Deformity (R AKA with TTP) present  Skin:     General: Skin is warm and dry  Neurological:      Mental Status: He is alert  Comments: Awake, alert and not responding verbally, able to give thumbs up on command   Psychiatric:         Thought Content:  Thought content normal          Lab Results:   Results from last 7 days   Lab Units 01/26/21  0529   WBC Thousand/uL 15 38*   HEMOGLOBIN g/dL 9 2*   HEMATOCRIT % 29 4*   PLATELETS Thousands/uL 209      Results from last 7 days   Lab Units 01/26/21  0529  01/23/21  0434 POTASSIUM mmol/L 2 7*   < > 3 9   CHLORIDE mmol/L 109*   < > 107   CO2 mmol/L 24   < > 26   BUN mg/dL 26*   < > 27*   CREATININE mg/dL 0 60   < > 0 55*   CALCIUM mg/dL 7 7*   < > 7 6*   ALK PHOS U/L  --   --  148*   ALT U/L  --   --  52   AST U/L  --   --  43    < > = values in this interval not displayed  Imaging Studies: I have personally reviewed pertinent reports  EKG, Pathology, and Other Studies: I have personally reviewed pertinent reports  Please note that the APS provides consultative services regarding pain management only  With the exception of ketamine and epidural infusions and except when indicated, final decisions regarding starting or changing doses of analgesic medications are at the discretion of the consulting service  Off hours consultation and/or medication management is generally not available      Tito Haider MD  Acute Pain Service

## 2021-01-26 NOTE — CASE MANAGEMENT
CM received [facility] "[place] of [facility]" paperwork from previous  following patient- provided by pt's emergency contact Ethlyn Cranker CM faxed [facility] paperwork to number provided on documentation  Fax# [phone]

## 2021-01-26 NOTE — PLAN OF CARE
Problem: Potential for Falls  Goal: Patient will remain free of falls  Description: INTERVENTIONS:  - Assess patient frequently for physical needs  -  Identify cognitive and physical deficits and behaviors that affect risk of falls  -  Commodore fall precautions as indicated by assessment   - Educate patient/family on patient safety including physical limitations  - Instruct patient to call for assistance with activity based on assessment  - Modify environment to reduce risk of injury  - Consider OT/PT consult to assist with strengthening/mobility  Outcome: Progressing     Problem: Nutrition/Hydration-ADULT  Goal: Nutrient/Hydration intake appropriate for improving, restoring or maintaining nutritional needs  Description: Monitor and assess patient's nutrition/hydration status for malnutrition  Collaborate with interdisciplinary team and initiate plan and interventions as ordered  Monitor patient's weight and dietary intake as ordered or per policy  Utilize nutrition screening tool and intervene as necessary  Determine patient's food preferences and provide high-protein, high-caloric foods as appropriate       INTERVENTIONS:  - Monitor oral intake, urinary output, labs, and treatment plans  - Assess nutrition and hydration status and recommend course of action  - Evaluate amount of meals eaten  - Assist patient with eating if necessary   - Allow adequate time for meals  - Recommend/ encourage appropriate diets, oral nutritional supplements, and vitamin/mineral supplements  - Order, calculate, and assess calorie counts as needed  - Recommend, monitor, and adjust tube feedings and TPN/PPN based on assessed needs  - Assess need for intravenous fluids  - Provide specific nutrition/hydration education as appropriate  - Include patient/family/caregiver in decisions related to nutrition  Outcome: Progressing

## 2021-01-26 NOTE — PROGRESS NOTES
Daily Progress Note - Angeline Shea Carmen Moss [address] 56 y o  male MRN: [id number]  Unit/Bed#: MICU 11 Encounter: [id number]        ----------------------------------------------------------------------------------------  HPI/24hr events:   -afebrile! OFF precedex and dilaudid drips post extubation 1/25/21  -reduced insulin algorithm to 1, no episodes of hypoglycemia  did not initiate D5 in spite of -180 range    -Had episode 2x of desatting while repositioning in bed to 80s%  Placed on NRB 2x and then transitioned to 12L Midflow and maintained saturations > 94%  -large volume secretions  Cont NPO until speech/ swallow eval  ---------------------------------------------------------------------------------------  SUBJECTIVE  Generalized bodily pain, weakness in upper extremities R >L  Review of Systems   Constitutional: Positive for fatigue  Negative for chills and fever  Respiratory: Negative for cough  High secretions, sore throat  Cannot cough   Gastrointestinal:        Having BM   Musculoskeletal:        Generalized body aches   Psychiatric/Behavioral: Positive for dysphoric mood  Review of systems was reviewed and negative unless stated above in HPI/24-hour events   ---------------------------------------------------------------------------------------Assessment and Plan:     Neuro:   · Diagnosis: Toxic Metabolic Encephalopathy, Sedation, and Pain Control  ? Plan: Mental status has been slowly improving  Patient was alert and able to follow commands appropriately  ? OFF precedex/ dilaudid drips 1/25 after extubated 1/25  ? Cont Gabapentin 400mg TID  Patient remains on Methadone 10mg IV Q8hrs  ? Will defer anesthesia nerve block at this time based upon the patient being a several days out from his AKA with formalization  ? Will attempt to wean patient down on sedation regimen  ? CAM-ICU        CV:   · Diagnosis: Aortic Thrombus  ? Plan: On therapeutic Lovenox  ?  Appreciate vascular surgery's input on AKA with formalization  · Diagnosis: Hx HTN  ? Plan: Holding home antihypertensives given patient has had hypotension while here in the MICU   ? OFF Levophed gtt  ? Patient has been hypotensive here in the MICU  Likely in the setting of sedation  Will continue monitoring BP and MAPs in addition to telemetry  ? Goal MAP > 65  Overnight MAPs 54-80      Pulm:  · Diagnosis: Acute Hypoxemic Respiratory Failure Secondary to COVID-19  ? Plan: Intubated on 1/12 for increased work of breathing and hypoxia  ? ventilator settings /6/50% saturating in the 90s  ? Extubated 1/25 to 6L NC then increased to 12L midflow due to desatting with repositioning   ? Attempt to wean patient as tolerated with hopes for extubation in the foreseeable future  ? CXR 1/22/2021 shows stable bilateral infiltrates with a well-placed CVC  ? Continue pulmonary hygiene  · Diagnosis: Pulmonary Embolus  ? Plan: CTA on 1/12/2021 showed single small nonocclusive pulmonary embolus in left lower region  ? Patient is anticoagulated on therapeutic Lovenox         GI:   · Diagnosis: GERD  ? Plan: Protonix BID  · Diagnosis: Prior Upper GI Bleed  ? Plan: Continue monitoring for signs of bleeding  ? Patient on PPI  ? Continue trending CBCs daily  · Diagnosis: Tube Feeding and Bowel Regimen  § Patient is on Glucerna feeding at goal   § Bowel regimen in place  No recent BMs documented  § Can increase bowel regimen if need be      :   · Diagnosis: Gross Hematuria with Urinary Tract Infection  ? Plan: UA on 1/12/2021 showed blood, no bacteria, no nitrites, and small leukocytes  ? Veras per urology   · Diagnosis: Acute Kidney Injury  ? Plan: Creatinine stable  Baseline  ANNALEE resolved  ? Continue BMP monitoring  ? I&O monitoring         F/E/N:   · Considering diuresing the patient today given wet-sounding cough  Will consider scopolamine as well for potential hypersecretion  · Continue electrolyte monitoring    · Replete electrolytes as indicated         Heme/Onc:   · Diagnosis: Anemia  ? Plan: Hemoglobin 8 1--7 5--9 2  Continue monitoring  No acute bleeding noticed  ? Patient has required units of PRBCs after AKA with formalization  Will continue to monitor         Endo:   · Diagnosis: T2DM  ? Plan: Patient initially presented with DKA which has since resolved  ? Insulin gtt  Goal GB: 140-180  ? Continue monitoring  Patient has maintained good glucose control overnight         ID:   · Diagnosis: COVID-19 Infection  ? Plan: Patient is on the severe COVID pathway  ? Completed 10 days of Decadron 0 1 mg/kg  ? Completed course of decadron taper  ? Continue vitamin cocktail and statin  ? Remdesavir, convalescent plasma, and Actemra not given  ? Inflammatory Markers:  § 1/22 Procalcitonin 0 75 (down from 0 82)  § 1/14  (down from 279 4)  § 1/14 Ferritin 1860 (up from 1608)  § 1/19 D-Dimer 2 54 (down from 12 77)  · Diagnosis: Leukocytosis  ? Plan: WBC count down tredning  Monitor cbc  ? Trend Fever curve  Consider non infectious etiology for fevers  ? cont Day 4 vancomycin/cefepime persistent fevers/ leukocytosis  ? Negative blood cultures X 48hrs  (1/21)  F/u MRSA and sputum contaminated  ? RIJ CVC removed and replaced 1/22  ? Continue monitoring Veras for potential for infection         MSK/Skin:   · Diagnosis: Acute Limb Ischemia with Rhabdomyolysis  ? Plan:  Duplex of the lower extremities on 1/12 revealed a right femoral deep venous thrombosis  ? Post intubation, the right lower extremity was found to be pale and pulseless  The patient was transferred to [place] for vascular surgery  ? CT scan revealed filling defect in descending aorta consistent with a thrombus  ? Patient is postop day 10 from a RLE AKA; amputation guillotine style   He is postop day for from formalization of the above the knee amputation  ? Continue to turn and reposition frequently; wound care appreciated  ? Offload pressure points  ?  Physical therapy and Occupational therapy consulted  ? On therapeutic Lovenox        Disposition: Continue Critical Care   Code Status: Level 1 - Full Code    ---------------------------------------------------------------------------------------  ICU CORE MEASURES    Prophylaxis   VTE Pharmacologic Prophylaxis: Enoxaparin (Lovenox)  VTE Mechanical Prophylaxis: sequential compression device  Stress Ulcer Prophylaxis: Pantoprazole IV     ABCDE Protocol (if indicated)  Plan to perform spontaneous awakening trial today? Not applicable  Plan to perform spontaneous breathing trial today? Not applicable  Obvious barriers to extubation? Not applicable  CAM-ICU: Negative    Invasive Devices Review  Invasive Devices     Central Venous Catheter Line            CVC Central Lines 21 Triple 16cm 4 days          Peripheral Intravenous Line            Peripheral IV 21 Right Antecubital less than 1 day    Peripheral IV 21 Right;Ventral (anterior) Forearm less than 1 day          Arterial Line            Arterial Line 21 Radial 13 days          Drain            Urethral Catheter 18 Fr  -- days              Can any invasive devices be discontinued today?  No  ---------------------------------------------------------------------------------------  OBJECTIVE    Vitals   Vitals:    21 0400 21 0500 21 0600 21 0627   BP:       Pulse: 86 86 88 100   Resp: 14 14 (!) 37 (!) 43   Temp: 97 9 °F (36 6 °C)      TempSrc: Oral      SpO2: 99% 98% 100% 94%   Weight:   78 6 kg (173 lb 4 5 oz)    Height:         Temp (24hrs), Av 2 °F (36 8 °C), Min:97 8 °F (36 6 °C), Max:99 4 °F (37 4 °C)  Current: Temperature: 97 9 °F (36 6 °C)  HR: 88  BP: 140/60  RR: 15  SpO2: 96%    Respiratory:  SpO2: SpO2: 100 %  Nasal Cannula O2 Flow Rate (L/min): 12 L/min    Invasive/non-invasive ventilation settings   Respiratory    Lab Data (Last 4 hours)    None         O2/Vent Data (Last 4 hours)    None                Physical Exam  Vitals signs and nursing note reviewed  Constitutional:       Appearance: Normal appearance  He is ill-appearing  HENT:      Head: Normocephalic  Nose: Congestion present  Comments: Dried discharge     Mouth/Throat:      Mouth: Mucous membranes are moist       Comments: B/l ulcers in corners of mouth  Eyes:      Pupils: Pupils are equal, round, and reactive to light  Cardiovascular:      Rate and Rhythm: Normal rate and regular rhythm  Pulses: Normal pulses  Heart sounds: Normal heart sounds  Pulmonary:      Effort: No respiratory distress  Breath sounds: No wheezing  Comments: 12L Midflow  Course sounds b l  Abdominal:      General: Abdomen is flat  Bowel sounds are normal  There is no distension  Palpations: Abdomen is soft  Tenderness: There is abdominal tenderness  Musculoskeletal:         General: Swelling present  Comments: RLE AKA   Skin:     General: Skin is warm  Capillary Refill: Capillary refill takes less than 2 seconds  Neurological:      Mental Status: He is alert  Motor: Weakness (R>L upper extremities) present  Laboratory and Diagnostics:  Results from last 7 days   Lab Units 01/26/21  0529 01/25/21  0436 01/24/21 0447 01/23/21  0434 01/22/21  0435 01/21/21  0547 01/21/21  0058  01/20/21  0513   WBC Thousand/uL 15 38* 8 37 7 27 10 63* 13 56* 17 07*  --   --  14 61*   HEMOGLOBIN g/dL 9 2* 7 5* 7 5* 8 1* 8 9* 8 6* 8 7*   < > 7 1*   HEMATOCRIT % 29 4* 23 7* 24 1* 25 5* 27 2* 26 1* 25 7*   < > 21 7*   PLATELETS Thousands/uL 209 147* 149 177 193 227  --   --  179   NEUTROS PCT %  --   --   --  89*  --   --   --   --   --    BANDS PCT %  --   --   --   --   --  3  --   --   --    MONOS PCT %  --   --   --  4  --   --   --   --   --    MONO PCT %  --   --   --   --   --  1*  --   --   --     < > = values in this interval not displayed       Results from last 7 days   Lab Units 01/25/21 0436 01/24/21 0447 01/23/21  0434 01/22/21  0516 01/21/21  0547 01/20/21  0529   SODIUM mmol/L 137 139 138 137 136 138   POTASSIUM mmol/L 3 5 3 7 3 9 4 1 4 0 4 4   CHLORIDE mmol/L 106 107 107 104 103 106   CO2 mmol/L 26 25 26 27 27 27   ANION GAP mmol/L 5 7 5 6 6 5   BUN mg/dL 33* 32* 27* 25 25 24   CREATININE mg/dL 0 60 0 68 0 55* 0 60 0 68 0 71   CALCIUM mg/dL 7 5* 7 9* 7 6* 7 9* 7 7* 7 7*   GLUCOSE RANDOM mg/dL 139 147* 154* 154* 196* 138   ALT U/L  --   --  52  --  46  --    AST U/L  --   --  43  --  59*  --    ALK PHOS U/L  --   --  148*  --  162*  --    ALBUMIN g/dL  --   --  1 3*  --  1 4*  --    TOTAL BILIRUBIN mg/dL  --   --  0 59  --  0 66  --      Results from last 7 days   Lab Units 01/25/21  0436 01/23/21  0434 01/22/21  0516 01/20/21  0529   MAGNESIUM mg/dL 2 0 2 0  --  2 0   PHOSPHORUS mg/dL 2 7 3 3 3 4 3 7      Results from last 7 days   Lab Units 01/20/21  1314 01/20/21  0433   PTT seconds 64* 52*              ABG:  Results from last 7 days   Lab Units 01/25/21  0436   PH ART  7 438   PCO2 ART mm Hg 31 6*   PO2 ART mm Hg 90 4   HCO3 ART mmol/L 20 9*   BASE EXC ART mmol/L -2 8   ABG SOURCE  Line, Arterial     VBG:  Results from last 7 days   Lab Units 01/25/21  0436   ABG SOURCE  Line, Arterial     Results from last 7 days   Lab Units 01/25/21  0436 01/24/21  0447 01/22/21  0606 01/21/21  1235   PROCALCITONIN ng/ml 0 57* 0 85* 0 75* 0 82*       Micro  Results from last 7 days   Lab Units 01/23/21  1735 01/23/21  0721 01/21/21  1401 01/21/21  1320   BLOOD CULTURE   --   --  No Growth After 4 Days  No Growth After 4 Days  SPUTUM CULTURE   --  2+ Growth of   --   --    GRAM STAIN RESULT   --  1+ Epithelial cells per low power field*  1+ Polys*  Rare Gram positive cocci in pairs*  Rare Yeast*  --   --    MRSA CULTURE ONLY  Culture results to follow    --   --   --        EKG: QTC wnl 1/25  Imaging: na    Intake and Output  I/O       01/24 0701 - 01/25 0700 01/25 0701 - 01/26 0700    I V  (mL/kg) 1054 9 (13 5) 695 3 (8 8)    NG/GT [address] 271    Total Intake(mL/kg) 4027 9 (51 5) 1346 3 (17 1)    Urine (mL/kg/hr) 1535 (0 8) 1510 (0 8)    Emesis/NG output 0     Total Output 1535 1510    Net +2492 9 -163 7              UOP: 1 5 L out in 24 hrs  Positive 8 6L overall    Height and Weights   Height: 5' 6" (167 6 cm)  IBW: 63 8 kg  Body mass index is 27 97 kg/m²  Weight (last 2 days)     Date/Time   Weight    01/26/21 0600   78 6 (173 28)                Nutrition       Diet Orders   (From admission, onward)             Start     Ordered    01/25/21 1832  Diet NPO  Diet effective now     Question Answer Comment   Diet Type NPO    RD to adjust diet per protocol?  Yes        01/25/21 1833              NPO- speech eval      Active Medications  Scheduled Meds:  Current Facility-Administered Medications   Medication Dose Route Frequency Provider Last Rate    acetaminophen  650 mg Oral Q6H PRN Sharon Rivera MD      acetaminophen  975 mg Oral TID Fuad Eason PA-C      bisacodyl  10 mg Rectal Daily PRN Sharon Rivera MD      cholecalciferol  2,000 Units Oral Daily Sharon Rivera MD      enoxaparin  1 mg/kg Subcutaneous Q12H [facility] VERONICA Barlow      gabapentin  400 mg Oral TID VERONICA Suggs      hydrocortisone sodium succinate  50 mg Intravenous Q6H [facility] Tan Trevino PA-C      insulin regular (HumuLIN R,NovoLIN R) infusion  0 3-21 Units/hr Intravenous Titrated VERONICA Suggs 1 Units/hr (01/26/21 0215)    levalbuterol  1 25 mg Nebulization TID Noé Stanley DO      methadone  10 mg Intravenous [facility] Hilda Trevino PA-C      multivitamin-minerals  1 tablet Oral Daily Sharon Rivera MD      pantoprazole  40 mg Intravenous Q12H [facility] Sharon Rivera MD      polyethylene glycol  17 g Oral Daily Sharon Rivera MD      senna  2 tablet Oral BID Sharon Rivera MD      sodium chloride  3 mL Nebulization TID Grant Diaz,        Continuous Infusions:  insulin regular (HumuLIN R,NovoLIN R) infusion, 0 3-21 Units/hr, Last Rate: 1 Units/hr (01/26/21 0215)      PRN Meds:   acetaminophen, 650 mg, Q6H PRN  bisacodyl, 10 mg, Daily PRN        Allergies   Allergies   Allergen Reactions    Varenicline      ---------------------------------------------------------------------------------------  Advance Directive and Living Will:      Power of :    POLST:    ---------------------------------------------------------------------------------------  Care Time Delivered:   Rodrigo Thomas MD      Portions of the record may have been created with voice recognition software  Occasional wrong word or "sound a like" substitutions may have occurred due to the inherent limitations of voice recognition software    Read the chart carefully and recognize, using context, where substitutions have occurred

## 2021-01-26 NOTE — SPEECH THERAPY NOTE
Speech Language/Pathology  Speech-Language Pathology Bedside Swallow Evaluation      Patient Name: Chastity Marie    OTQQS'G Date: 1/26/2021     Problem List  Principal Problem:    Pneumonia due to COVID-19 virus  Active Problems:    Diabetic ketoacidosis without coma associated with type 2 diabetes mellitus ([facility] )    Acute metabolic encephalopathy    Acute kidney injury ([facility] )    Acute respiratory failure with hypoxia ([place] [address] )    Sepsis due to [facility])    Metabolic acidosis, increased anion gap    Aortic thrombus ([place] [address] )    Anemia      Past Medical History  Past Medical History:   Diagnosis Date    Diabetes mellitus ([place] [address] )     GERD (gastroesophageal reflux disease)     Hypercholesteremia     Hypertension     Methadone use ([place] [address] )        Past Surgical History  Past Surgical History:   Procedure Laterality Date    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    Procedure: AMPUTATION ABOVE KNEE (AKA); Surgeon: Danny Negron MD;  [facility];  Service: Vascular    AMPUTATION ABOVE KNEE (AKA) Right 1/18/2021    Procedure: AMPUTATION ABOVE KNEE (AKA) FORMALIZATION,  R AKA wound washout, wound closure;  Surgeon: Danny Negron MD;  [facility];  Service: Vascular    ARTERIOGRAM Right 1/13/2021    Procedure: ARTERIOGRAM;  Surgeon: Marcie Larios DO;  [facility];  Service: Vascular    IR LOWER EXTREMITY ANGIOGRAM  1/14/2021    THROMBECTOMY W/ EMBOLECTOMY Right 1/13/2021    Procedure: EMBOLECTOMY/THROMBECTOMY LOWER EXTREMITY;  Surgeon: Marcie Larios DO;  [facility];  Service: Vascular       Summary   Pt presented with s/s suggestive of severe oral and suspected severe pharyngeal dysphagia    Symptoms or concerns included decreased bolus acceptance, decreased bolus propulsion and suspected decreased control of all material with drooling & need for suctioning mult times,  suspected pharyngeal swallow delay, suspected decreased hyolaryngeal elevation upon palpation and severe coughing in response to trials as well as drop in O2  Pt poorly responsive by end of the session  Not appropriate for po  Risk/s for Aspiration: HIGH      Recommended Diet: NPO   Recommended Form of Meds: non-oral if possible   Aspiration precautions and swallowing strategies: oral care, suction secretions or encourage swallows  Other Recommendations/Considerations: may need short term NG until he can maintain some po        Current Medical Status  Pt is a 56 y o  male who presented [facility] to [facility] [address] 1/11/2021 with ARF 2* COVID likely exacerbated by DKA & superimposed bacterial infection, gross hematuria noted in chart  Pt w h/o Hep C, DM2, HTN, HLP, and chronic methadone use  He presented to the ER at [address] several times w/ N/V/ but no hypoxia until the 11th  His respiratory status declined & he required higher level of care  He was transferred to [facility] on 1/11/2021  Required BIPAP, bedside flexible cystoscopy  1/12 Intubated, CT chest showed left lower lobe PE, with generalized GGO  Right femoral DVT discovered on duplex of lower extremities  Right foot was noted to be cool to touch      1/13 right foot noted to remain cool and concern for decreased flow so vascular consult placed  Upon consult they noted the patient had a filling defect in his descending aorta on prior CT scan consistent with thrombus  Recommend urgent transfer to [place] for possible intervention/ thrombectomy, vascular consult  Pt underwent attempts at revascularization of the RLE on 1/13/2021 however he required R AKA on 1/14/2021 2* [place] III Acute Limb Ischemia  R AKA formalization in the OR completed on 1/18/2021  The pt was extubated 1/25/2021      Current Precautions:  Fall  Aspiration  Contact  Airborne      Allergies:  No known food allergies    Past medical history:  Please see H&P for details    Special Studies:  CXR 1/22/2021-Right jugular catheter at cavoatrial junction with no pneumothorax   No change in extensive bilateral groundglass opacity due to Covid 19 pneumonia        Social/Education/Vocational Hx:  Pt lives with family-sister & her family  Swallow Information   Current Risks for Dysphagia & Aspiration: recent surgery, recent intubation and prolonged intubation  Current Symptoms/Concerns: change in respiratory status and severe drooling & weak to no voicing post extubation  Current Diet: NPO   Baseline Diet: regular diet and thin liquids      Baseline Assessment   Behavior/Cognition: lethargic and cannot maintain head position, needs physical assist & verbal cues to lift head off of the L shoulder  Speech/Language Status: limited verbal output and mostly unintelligible  Patient Positioning: upright in bed-slp assisted w/ head position, lifting to neutral   Pain Status/Interventions/Response to Interventions:   No report of or nonverbal indications of pain  Swallow Mechanism Exam  Facial: poor movement b/l  Labial: bilateral decreased ROM, decreased strength and decreased coordination  Lingual: decreased ROM, bilateral decreased strength and decreased coordination  Velum: unable to visualize  Mandible:  decreased ROM  Dentition: edentulous  Vocal quality:breathy and aphonic   Volitional Cough: weak   Respiratory Status: on NC, began at 94 %      Consistencies Assessed and Performance   Consistencies Administered: nectar thick, honey thick and puree- by tsp only    Oral Stage: severe  Pt w/ weak seal to accept, poor containment w/ noted drooling of secretions as well as periodically the material offered  Weak lingual propulsion suspected w/ risk for spill prior to the swallow attempts  Pharyngeal Stage: suspected, severe and profound  Swallow Mechanics:  Swallowing initiation appeared severely delayed, effortful  Laryngeal rise was palpated and judged to be severely reduced  The pt attempted to throw his head back to increase swallows    Noted coughing by the end of the trials  Pt unable to recover, did not answer & sats dropped to 86%  Suctioning occurred throughout the whole session  Pt unable to clear any of the secretions  Nsg was alerted  Esophageal Concerns: none reported    Strategies and Efficacy: max assistance offered to maintain a neutral head position  Summary and Recommendations (see above)    Results Reviewed with: patient, RN and MD     Treatment Recommended: yes     Frequency of treatment: as able     Patient Stated Goal: unable to state    Dysphagia LTG  -Patient will demonstrate safe and effective oral intake (without overt s/s significant oral/pharyngeal dysphagia including s/s penetration or aspiration) for the highest appropriate diet level       Speech Therapy Prognosis   Prognosis: guarded    Prognosis Considerations: medical status and medically fragile status

## 2021-01-26 NOTE — PROGRESS NOTES
Pastoral Care Progress Note    2021  Patient: Michelle Arguello : 1960  Admission Date & Time: 2021 1428  MRN: [id number] CSN: [id number]        Fr Lorie العراقي Pt                  21 2100   Clinical Encounter Type   Visited With Patient   Congregation Encounters   Congregation Needs Prayer

## 2021-01-26 NOTE — PROGRESS NOTES
Vancomycin IV Pharmacy-to-Dose Consultation    Gavin Russell is a 56 y o  male who was receiving vancomycin IV with management by the Pharmacy Consult service  The patient's vancomycin therapy has been discontinued  Thank you for allowing us to take part in this patient's care  Pharmacy will sign-off at this point; please call if there are any questions        Tisha Palomo, PharmD, [facility] Pharmacist   [phone]

## 2021-01-26 NOTE — NUTRITION
Recommend increase Glucerna 1 2 by 10 ml q 4 hrs as nataliya to goal rate of 68 ml/hr to provide qd: 1632 ml,1958 Kcal,98 gm PRO,187 gm CHO,98 gm Fat,1314 ml Free H2O,1 65 mg CHO/kg/min  Check Ionized Ca  Monitor renal parameters  Replete K+  Monitor daily weights  Continue Free H2O flushes as Rxed as tolerated  Monitor electrolytes

## 2021-01-26 NOTE — QUICK NOTE
Called pt's nephew, Lea Khan with update regarding pt's care  Answered all questions  Will try to set up video conference with family at [address] [facility]) Phone # [phone]; preferred Facetime

## 2021-01-26 NOTE — OCCUPATIONAL THERAPY NOTE
OT CANCEL NOTE    OT orders received  Chart reviewed  Pt is now extubated but per RN not yet medically stable for skilled OT services  Will hold initial OT evaluation  Will continue to follow pt on caseload and see pt when medically stable and as clinically appropriate         01/26/21 6983   Note Type   Cancel Reasons Medical status       Andrea Apple MS, OTR/L

## 2021-01-26 NOTE — PHYSICAL THERAPY NOTE
Physical Therapy Cancellation Note    PT orders received  Chart review completed  Spoke to nsg, not appropriate for skilled PT at this time 2* to medical status  PT will follow and eval as medically appropriate       01/26/21 1100   PT Last Visit   PT Visit Date 01/26/21   Note Type   Cancel Reasons Medical status     Vanessa Euceda, PT

## 2021-01-27 LAB
ANION GAP SERPL CALCULATED.3IONS-SCNC: 4 MMOL/L (ref 4–13)
ATRIAL RATE: 84 BPM
BUN SERPL-MCNC: 30 MG/DL (ref 5–25)
CALCIUM SERPL-MCNC: 7.8 MG/DL (ref 8.3–10.1)
CHLORIDE SERPL-SCNC: 117 MMOL/L (ref 100–108)
CO2 SERPL-SCNC: 25 MMOL/L (ref 21–32)
CREAT SERPL-MCNC: 0.57 MG/DL (ref 0.6–1.3)
ERYTHROCYTE [DISTWIDTH] IN BLOOD BY AUTOMATED COUNT: 17.1 % (ref 11.6–15.1)
GFR SERPL CREATININE-BSD FRML MDRD: 110 ML/MIN/1.73SQ M
GLUCOSE SERPL-MCNC: 133 MG/DL (ref 65–140)
GLUCOSE SERPL-MCNC: 134 MG/DL (ref 65–140)
GLUCOSE SERPL-MCNC: 135 MG/DL (ref 65–140)
GLUCOSE SERPL-MCNC: 147 MG/DL (ref 65–140)
GLUCOSE SERPL-MCNC: 149 MG/DL (ref 65–140)
GLUCOSE SERPL-MCNC: 156 MG/DL (ref 65–140)
GLUCOSE SERPL-MCNC: 164 MG/DL (ref 65–140)
GLUCOSE SERPL-MCNC: 169 MG/DL (ref 65–140)
GLUCOSE SERPL-MCNC: 210 MG/DL (ref 65–140)
GLUCOSE SERPL-MCNC: 310 MG/DL (ref 65–140)
GLUCOSE SERPL-MCNC: 402 MG/DL (ref 65–140)
HCT VFR BLD AUTO: 28.1 % (ref 36.5–49.3)
HGB BLD-MCNC: 8.5 G/DL (ref 12–17)
MAGNESIUM SERPL-MCNC: 2.4 MG/DL (ref 1.6–2.6)
MCH RBC QN AUTO: 29.3 PG (ref 26.8–34.3)
MCHC RBC AUTO-ENTMCNC: 30.2 G/DL (ref 31.4–37.4)
MCV RBC AUTO: 97 FL (ref 82–98)
P AXIS: 66 DEGREES
PHOSPHATE SERPL-MCNC: 2.5 MG/DL (ref 2.3–4.1)
PLATELET # BLD AUTO: 193 THOUSANDS/UL (ref 149–390)
PMV BLD AUTO: 11.2 FL (ref 8.9–12.7)
POTASSIUM SERPL-SCNC: 4.1 MMOL/L (ref 3.5–5.3)
PR INTERVAL: 121 MS
QRS AXIS: 70 DEGREES
QRSD INTERVAL: 71 MS
QT INTERVAL: 379 MS
QTC INTERVAL: 448 MS
RBC # BLD AUTO: 2.9 MILLION/UL (ref 3.88–5.62)
SODIUM SERPL-SCNC: 146 MMOL/L (ref 136–145)
T WAVE AXIS: 64 DEGREES
VENTRICULAR RATE: 84 BPM
WBC # BLD AUTO: 10.37 THOUSAND/UL (ref 4.31–10.16)

## 2021-01-27 PROCEDURE — 93010 ELECTROCARDIOGRAM REPORT: CPT | Performed by: INTERNAL MEDICINE

## 2021-01-27 PROCEDURE — 97167 OT EVAL HIGH COMPLEX 60 MIN: CPT

## 2021-01-27 PROCEDURE — 94760 N-INVAS EAR/PLS OXIMETRY 1: CPT

## 2021-01-27 PROCEDURE — C9113 INJ PANTOPRAZOLE SODIUM, VIA: HCPCS | Performed by: NURSE PRACTITIONER

## 2021-01-27 PROCEDURE — 80048 BASIC METABOLIC PNL TOTAL CA: CPT | Performed by: NURSE PRACTITIONER

## 2021-01-27 PROCEDURE — 82948 REAGENT STRIP/BLOOD GLUCOSE: CPT

## 2021-01-27 PROCEDURE — 94640 AIRWAY INHALATION TREATMENT: CPT

## 2021-01-27 PROCEDURE — 84100 ASSAY OF PHOSPHORUS: CPT | Performed by: STUDENT IN AN ORGANIZED HEALTH CARE EDUCATION/TRAINING PROGRAM

## 2021-01-27 PROCEDURE — 93005 ELECTROCARDIOGRAM TRACING: CPT

## 2021-01-27 PROCEDURE — 94668 MNPJ CHEST WALL SBSQ: CPT

## 2021-01-27 PROCEDURE — 85027 COMPLETE CBC AUTOMATED: CPT | Performed by: NURSE PRACTITIONER

## 2021-01-27 PROCEDURE — 99233 SBSQ HOSP IP/OBS HIGH 50: CPT | Performed by: INTERNAL MEDICINE

## 2021-01-27 PROCEDURE — 97163 PT EVAL HIGH COMPLEX 45 MIN: CPT

## 2021-01-27 PROCEDURE — 83735 ASSAY OF MAGNESIUM: CPT | Performed by: STUDENT IN AN ORGANIZED HEALTH CARE EDUCATION/TRAINING PROGRAM

## 2021-01-27 RX ORDER — ACETAMINOPHEN 325 MG/1
650 TABLET ORAL EVERY 6 HOURS PRN
Status: DISCONTINUED | OUTPATIENT
Start: 2021-01-27 | End: 2021-02-22

## 2021-01-27 RX ORDER — METHADONE HYDROCHLORIDE 10 MG/ML
23 CONCENTRATE ORAL EVERY 8 HOURS SCHEDULED
Status: DISCONTINUED | OUTPATIENT
Start: 2021-01-27 | End: 2021-01-27

## 2021-01-27 RX ORDER — HYDROMORPHONE HCL/PF 1 MG/ML
0.5 SYRINGE (ML) INJECTION
Status: DISCONTINUED | OUTPATIENT
Start: 2021-01-27 | End: 2021-02-06

## 2021-01-27 RX ORDER — HYDROMORPHONE HCL/PF 1 MG/ML
0.5 SYRINGE (ML) INJECTION
Status: DISCONTINUED | OUTPATIENT
Start: 2021-01-27 | End: 2021-02-04

## 2021-01-27 RX ORDER — METHADONE HYDROCHLORIDE 10 MG/ML
23 CONCENTRATE ORAL EVERY 8 HOURS SCHEDULED
Status: DISCONTINUED | OUTPATIENT
Start: 2021-01-27 | End: 2021-02-04

## 2021-01-27 RX ORDER — INSULIN GLARGINE 100 [IU]/ML
20 INJECTION, SOLUTION SUBCUTANEOUS
Status: DISCONTINUED | OUTPATIENT
Start: 2021-01-27 | End: 2021-01-28

## 2021-01-27 RX ADMIN — HYDROCORTISONE SODIUM SUCCINATE 25 MG: 100 INJECTION, POWDER, FOR SOLUTION INTRAMUSCULAR; INTRAVENOUS at 23:13

## 2021-01-27 RX ADMIN — METHADONE HYDROCHLORIDE 10 MG: 10 INJECTION, SOLUTION INTRAMUSCULAR; INTRAVENOUS; SUBCUTANEOUS at 06:13

## 2021-01-27 RX ADMIN — GABAPENTIN 400 MG: 400 CAPSULE ORAL at 21:11

## 2021-01-27 RX ADMIN — ISODIUM CHLORIDE 3 ML: 0.03 SOLUTION RESPIRATORY (INHALATION) at 19:17

## 2021-01-27 RX ADMIN — GLYCOPYRROLATE 1 MG: 1 TABLET ORAL at 21:11

## 2021-01-27 RX ADMIN — GLYCOPYRROLATE 1 MG: 1 TABLET ORAL at 08:00

## 2021-01-27 RX ADMIN — DICLOFENAC SODIUM 4 G: 10 GEL TOPICAL at 21:12

## 2021-01-27 RX ADMIN — GLYCOPYRROLATE 1 MG: 1 TABLET ORAL at 15:45

## 2021-01-27 RX ADMIN — ENOXAPARIN SODIUM 80 MG: 80 INJECTION SUBCUTANEOUS at 23:13

## 2021-01-27 RX ADMIN — INSULIN GLARGINE 20 UNITS: 100 INJECTION, SOLUTION SUBCUTANEOUS at 21:11

## 2021-01-27 RX ADMIN — INSULIN LISPRO 1 UNITS: 100 INJECTION, SOLUTION INTRAVENOUS; SUBCUTANEOUS at 14:02

## 2021-01-27 RX ADMIN — LEVALBUTEROL HYDROCHLORIDE 1.25 MG: 1.25 SOLUTION, CONCENTRATE RESPIRATORY (INHALATION) at 19:17

## 2021-01-27 RX ADMIN — DICLOFENAC SODIUM 4 G: 10 GEL TOPICAL at 12:38

## 2021-01-27 RX ADMIN — LIDOCAINE HYDROCHLORIDE 15 ML: 20 SOLUTION ORAL; TOPICAL at 08:00

## 2021-01-27 RX ADMIN — DICLOFENAC SODIUM 4 G: 10 GEL TOPICAL at 08:00

## 2021-01-27 RX ADMIN — DICLOFENAC SODIUM 4 G: 10 GEL TOPICAL at 17:30

## 2021-01-27 RX ADMIN — Medication 1 TABLET: at 08:00

## 2021-01-27 RX ADMIN — ISODIUM CHLORIDE 3 ML: 0.03 SOLUTION RESPIRATORY (INHALATION) at 13:31

## 2021-01-27 RX ADMIN — HYDROCORTISONE SODIUM SUCCINATE 50 MG: 100 INJECTION, POWDER, FOR SOLUTION INTRAMUSCULAR; INTRAVENOUS at 17:30

## 2021-01-27 RX ADMIN — INSULIN LISPRO 6 UNITS: 100 INJECTION, SOLUTION INTRAVENOUS; SUBCUTANEOUS at 17:31

## 2021-01-27 RX ADMIN — HYDROCORTISONE SODIUM SUCCINATE 50 MG: 100 INJECTION, POWDER, FOR SOLUTION INTRAMUSCULAR; INTRAVENOUS at 06:13

## 2021-01-27 RX ADMIN — GABAPENTIN 400 MG: 400 CAPSULE ORAL at 15:45

## 2021-01-27 RX ADMIN — HYDROCORTISONE SODIUM SUCCINATE 50 MG: 100 INJECTION, POWDER, FOR SOLUTION INTRAMUSCULAR; INTRAVENOUS at 12:31

## 2021-01-27 RX ADMIN — ENOXAPARIN SODIUM 80 MG: 80 INJECTION SUBCUTANEOUS at 12:38

## 2021-01-27 RX ADMIN — PANTOPRAZOLE SODIUM 40 MG: 40 INJECTION, POWDER, FOR SOLUTION INTRAVENOUS at 09:27

## 2021-01-27 RX ADMIN — GABAPENTIN 400 MG: 400 CAPSULE ORAL at 08:00

## 2021-01-27 RX ADMIN — HYDROCORTISONE SODIUM SUCCINATE 50 MG: 100 INJECTION, POWDER, FOR SOLUTION INTRAMUSCULAR; INTRAVENOUS at 00:16

## 2021-01-27 RX ADMIN — ENOXAPARIN SODIUM 80 MG: 80 INJECTION SUBCUTANEOUS at 00:16

## 2021-01-27 RX ADMIN — LEVALBUTEROL HYDROCHLORIDE 1.25 MG: 1.25 SOLUTION, CONCENTRATE RESPIRATORY (INHALATION) at 07:39

## 2021-01-27 RX ADMIN — LEVALBUTEROL HYDROCHLORIDE 1.25 MG: 1.25 SOLUTION, CONCENTRATE RESPIRATORY (INHALATION) at 13:31

## 2021-01-27 RX ADMIN — METHADONE HYDROCHLORIDE 23 MG: 10 CONCENTRATE ORAL at 22:00

## 2021-01-27 RX ADMIN — Medication 2000 UNITS: at 08:00

## 2021-01-27 RX ADMIN — METHADONE HYDROCHLORIDE 10 MG: 10 INJECTION, SOLUTION INTRAMUSCULAR; INTRAVENOUS; SUBCUTANEOUS at 13:43

## 2021-01-27 RX ADMIN — ISODIUM CHLORIDE 3 ML: 0.03 SOLUTION RESPIRATORY (INHALATION) at 07:39

## 2021-01-27 RX ADMIN — INSULIN LISPRO 4 UNITS: 100 INJECTION, SOLUTION INTRAVENOUS; SUBCUTANEOUS at 23:17

## 2021-01-27 NOTE — PROGRESS NOTES
Progress Note - Acute Pain Service    Ciro Otero 56 y o  male MRN: [id number]  Unit/Bed#: MICU 11 Encounter: [id number]      Assessment:   Principal Problem:    Pneumonia due to COVID-19 virus  Active Problems:    DM2 (diabetes mellitus, type 2) ([facility] [address] )    Acute metabolic encephalopathy    Acute respiratory failure with hypoxia (HCC)    Sepsis due to COVID-19 [facility])    Aortic thrombus (HCC)    Anemia    Hypokalemia    Wendy Alvarez is a 56 y  o  male with past medical history significant for hypertension, hyperlipidemia, GERD, hepatitis-C, diabetes mellitus, opioid use disorder chronically on methadone 70 mg daily who initially presented to [facility] with nausea and vomiting was had complicated hospitalization including DKA which has been treated but remains on insulin infusion, COVID leading to acute hypoxic respiratory failure requiring intubation, encephalopathy, elevation in transaminases, acute kidney injury which has resolved, pulmonary embolism, hematemesis, hematuria status post cystoscopy and catheter placement   Patient also noted to have ischemic right lower extremity for which vascular surgery was consulted who started patient on heparin infusion it was transferred to [facility] is now status post AKA on 01/14/2020   Acute limb ischemia likely in the setting of aortic thrombus   IV methadone was started in lieu of home oral methadone   Patient underwent formalization of RLE AKA on 01/18/2021  Currently on propofol and hydromorphone infusions  Ketamine d/c'd 01/19/2021  On therapeutic enoxaparin  Hydromorphone PCA discontinued on 01/25/2021  Extubated on 01/25/2021      Plan:   - continue acetaminophen 650 mg p o  Q 6 hours p r n  For mild pain  - continue gabapentin 400 mg p o  T i d  Scheduled  - discontinue IV methadone  - start methadone oral solution 23 mg per NG tube t i d  Scheduled (once pain is better controlled or upon discharge would return to home dose of 70 mg p o   Daily)  - continue hydromorphone 0 5 mg IV q 3 hours p r n  For moderate or severe pain  - continue hydromorphone 0 5 mg IV q 3 hours p r n  For breakthrough pain  - wean prn opioids as tolerated and switch to oral regimen when possible  - patient is on hydrocortisone per primary team  - bowel regimen with senna amrit, bisacodyl prn , polyethylene glycol amrit     APS will sign off at this time  Thank you for the consult  All opioids and other analgesics to be written at discretion of primary team  Please call  / [phone] or [facility] (/ between [phone] and on weekends) with questions or concerns    Pain History  Current pain location(s): R stump  Pain Scale:   Unable to ascertain  Quality: Unable to ascertain  24 hour history:  Patient examined at bedside  Patient did not receive any opioid analgesics other than scheduled methadone over the past 24 hours  NG tube was placed  Patient was lethargic overnight  Patient now refers pain in his right stump  Patient is now less lethargic  Unable to obtain full review of systems given patient does not speak      Opioid requirement previous 24 hours:   Methadone 30 mg IV    Meds/Allergies   all current active meds have been reviewed and current meds:   Current Facility-Administered Medications   Medication Dose Route Frequency    acetaminophen (TYLENOL) tablet 650 mg  650 mg Oral Q6H PRN    bisacodyl (DULCOLAX) rectal suppository 10 mg  10 mg Rectal Daily PRN    cholecalciferol (VITAMIN D3) tablet 2,000 Units  2,000 Units Oral Daily    Diclofenac Sodium (VOLTAREN) 1 % topical gel 4 g  4 g Topical 4x Daily    enoxaparin (LOVENOX) subcutaneous injection 80 mg  1 mg/kg Subcutaneous Q12H [address]    gabapentin (NEURONTIN) capsule 400 mg  400 mg Oral TID    glycopyrrolate (ROBINUL) tablet 1 mg  1 mg Oral TID    hydrocortisone sodium succinate (PF) (Solu-CORTEF) injection 50 mg  50 mg Intravenous Q6H [address]    HYDROmorphone (DILAUDID) injection 0 5 mg  0 5 mg Intravenous Q3H PRN    Or    HYDROmorphone (DILAUDID) injection 0 5 mg  0 5 mg Intravenous Q3H PRN    insulin regular (HumuLIN R,NovoLIN R) 1 Units/mL in sodium chloride 0 9 % 100 mL infusion  0 3-21 Units/hr Intravenous Titrated    levalbuterol (XOPENEX) inhalation solution 1 25 mg  1 25 mg Nebulization TID    Lidocaine Viscous HCl (XYLOCAINE) 2 % mucosal solution 15 mL  15 mL Swish & Spit 4x Daily PRN    menthol-methyl salicylate (BENGAY) 97-85 % cream   Apply externally 4x Daily PRN    methadone (DOLOPHINE) injection 10 mg  10 mg Intravenous Q8H [facility]    multivitamin-minerals (CENTRUM ADULTS) tablet 1 tablet  1 tablet Oral Daily    pantoprazole (PROTONIX) injection 40 mg  40 mg Intravenous Q24H LIZETT    phenol (CHLORASEPTIC) 1 4 % mucosal liquid 1 spray  1 spray Mouth/Throat Q2H PRN    polyethylene glycol (MIRALAX) packet 17 g  17 g Oral Daily    senna (SENOKOT) tablet 8 6 mg  1 tablet Oral BID    sodium chloride 0 9 % inhalation solution 3 mL  3 mL Nebulization TID       Allergies   Allergen Reactions    Varenicline        Objective     Temp:  [97 6 °F (36 4 °C)-98 4 °F (36 9 °C)] 98 4 °F (36 9 °C)  HR:  [] 102  Resp:  [13-25] 24  BP: (113-151)/(53-75) 135/63  Arterial Line BP: (122-128)/(54-58) 128/58    Physical Exam  Vitals signs and nursing note reviewed  Constitutional:       Appearance: Normal appearance  He is not ill-appearing or toxic-appearing  HENT:      Head: Normocephalic and atraumatic  Eyes:      General: No scleral icterus  Conjunctiva/sclera: Conjunctivae normal    Cardiovascular:      Rate and Rhythm: Normal rate  Pulmonary:      Effort: Pulmonary effort is normal  No respiratory distress  Musculoskeletal:         General: Tenderness (R stump) and deformity (Right AKA) present  Skin:     General: Skin is warm and dry  Neurological:      Mental Status: He is alert  Comments: Awake, alert, follows commands   Psychiatric:         Thought Content:  Thought content normal          Lab Results:   Results from last 7 days   Lab Units 01/27/21  0501   WBC Thousand/uL 10 37*   HEMOGLOBIN g/dL 8 5*   HEMATOCRIT % 28 1*   PLATELETS Thousands/uL 193      Results from last 7 days   Lab Units 01/27/21  0512  01/23/21  0434   POTASSIUM mmol/L 4 1   < > 3 9   CHLORIDE mmol/L 117*   < > 107   CO2 mmol/L 25   < > 26   BUN mg/dL 30*   < > 27*   CREATININE mg/dL 0 57*   < > 0 55*   CALCIUM mg/dL 7 8*   < > 7 6*   ALK PHOS U/L  --   --  148*   ALT U/L  --   --  52   AST U/L  --   --  43    < > = values in this interval not displayed  Imaging Studies: I have personally reviewed pertinent reports  EKG, Pathology, and Other Studies: I have personally reviewed pertinent reports  Please note that the APS provides consultative services regarding pain management only  With the exception of ketamine and epidural infusions and except when indicated, final decisions regarding starting or changing doses of analgesic medications are at the discretion of the consulting service  Off hours consultation and/or medication management is generally not available      Dino Kinney MD  Acute Pain Service

## 2021-01-27 NOTE — PHYSICAL THERAPY NOTE
Physical Therapy Evaluation Note     Patient Name: Michelle Arguello    ZGBUU'T Date: 1/27/2021     Problem List  Principal Problem:    Pneumonia due to COVID-19 virus  Active Problems:    DM2 (diabetes mellitus, type 2) ([place] [address] )    Acute metabolic encephalopathy    Acute respiratory failure with hypoxia ([place] [address] )    Sepsis due to COVID-19 [facility])    Aortic thrombus (HCC)    Anemia    Hypokalemia       Past Medical History  Past Medical History:   Diagnosis Date    Diabetes mellitus ([place] [address] )     GERD (gastroesophageal reflux disease)     Hypercholesteremia     Hypertension     Methadone use ([place] [address] )         Past Surgical History  Past Surgical History:   Procedure Laterality Date    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    Procedure: AMPUTATION ABOVE KNEE (AKA); Surgeon: Luis Enrique Narvaez MD;  [facility];  Service: Vascular    AMPUTATION ABOVE KNEE (AKA) Right 1/18/2021    Procedure: AMPUTATION ABOVE KNEE (AKA) FORMALIZATION,  R AKA wound washout, wound closure;  Surgeon: Luis Enrique Nravaez MD;  [facility];  Service: Vascular    ARTERIOGRAM Right 1/13/2021    Procedure: ARTERIOGRAM;  Surgeon: Clement Hebert DO;  [facility];  Service: Vascular    IR LOWER EXTREMITY ANGIOGRAM  1/14/2021    THROMBECTOMY W/ EMBOLECTOMY Right 1/13/2021    Procedure: EMBOLECTOMY/THROMBECTOMY LOWER EXTREMITY;  Surgeon: Clement Hebert DO;  [facility];  Service: Vascular        01/27/21 1120   PT Last Visit   PT Visit Date 01/27/21   Note Type   Note type Evaluation   Pain Assessment   Pain Assessment Tool 0-10   Pain Score Worst Possible Pain   Pain Location/Orientation Orientation: Right;Location: Leg   Home Living   Additional Comments Pt is unable to state PLOF or home set up at this time  Will need to confirm with CM  Prior Function   Comments Pt is unable to state PLOF or home set up at this time  Will need to confirm with CM  Restrictions/Precautions   Weight Bearing Precautions Per Order Yes   RLE Weight Bearing Per Order NWB  (s/p AKA)   Other Precautions Contact/isolation; Airborne/isolation;Cognitive;Multiple lines;Telemetry;O2;Fall Risk   General   Family/Caregiver Present No   Cognition   Overall Cognitive Status Impaired   Arousal/Participation Alert   RLE Assessment   RLE Assessment   (unable to move at this time )   LLE Assessment   LLE Assessment X   Strength LLE   L Hip Flexion 2-/5   L Ankle Dorsiflexion 3-/5   L Ankle Plantar Flexion 3-/5   Coordination   Sensation WFL   Light Touch   RLE Light Touch Grossly intact   LLE Light Touch Grossly intact   Bed Mobility   Rolling L 2  Maximal assistance   Additional items Assist x 1   Additional Comments PT was able to perform long sit with max A x2 and maintain with Max Ax1  Balance   Static Sitting Poor -   Dynamic Sitting Poor -   Endurance Deficit   Endurance Deficit Yes   Activity Tolerance   Activity Tolerance Patient limited by fatigue;Patient limited by pain   Medical Staff Made Aware OT   Nurse Made Aware nurse approved therapy session   Assessment   Prognosis Fair   Problem List Decreased strength;Decreased endurance; Impaired balance;Decreased mobility; Decreased safety awareness;Pain;Orthopedic restrictions   Assessment Pt is a 55 yo male admitted to [place] [address] on 1/18/2021 s/p wheezing, SOB and weakness  Pt had surgery on 1/13 for embolectomy/thrombectomy R LE and arteriogram  Pt had surgery on 1/14 guillotene amputation AKA on R LE  Surgery on 1/18 completion of R AKA  DX: toxic metabolic encephalopathy, aortic thrombus, HTN, acute hypoxemic respiratory failure secondary to COVID-19, PE, gross hematuria with UTI, anemia, DM, COVID-19, leukocytosis, acute limb ischemic and rhabdomyolysis  Two patient identifiers were used to confirm  Pt is unable to state PLOF  Will need to confirm with CM   Pt's impairments include reduced mobility, high risk of falling, reduced ROM of neck, reduced B LE strength and endurance, confusion, high risk of falling, O2 dependent, reduced activity tolerance  These impairments limit the ability of the patient to perform mobility without increased assistance, return to PLOF and participate in everyday life activities  Pt would benefit from continued skilled therapy while in the hospital to improve overall mobility and work towards a safe d/c  Recommend discharge to rehab  At the end of the session the patient was left in seated position with call bell and phone within reach  The patient's AM-PAC Basic Mobility Inpatient Short Form Low Function Raw Score 10 , Standardized Score is 14 65  A standardized score less 42 9 suggests the patient may benefit from discharge to post-acute rehab services  Please also refer to the recommendation of the Physical Therapist for safe discharge planning  Barriers to Discharge Inaccessible home environment;Decreased caregiver support   Goals   STG Expiration Date 02/10/21   Short Term Goal #1 STG 1: Pt will perform bed mobility at a min A level to improve mobility and work towards a safe d c  STG 2 Pt will sit EOB for 45-50 minutes at a min A level to improve mobility and work towards a safe d c     PT Treatment Day 0   Plan   Treatment/Interventions Functional transfer training;LE strengthening/ROM; Therapeutic exercise; Endurance training;Bed mobility   PT Frequency 2-3x/wk   Recommendation   PT Discharge Recommendation Post-Acute Rehabilitation Services   PT - OK to Discharge Yes   Additional Comments if to rehab   Jayne 8 in Bed Without Bedrails 1   Lying on Back to Sitting on Edge of Flat Bed 1   Moving Bed to Chair 1   Standing Up From Chair 1   Walk in Room 1   Climb 3-5 Stairs 1   Basic Mobility Inpatient Raw Score 6   Turning Head Towards Sound 2   Follow Simple Instructions 2   Low Function Basic Mobility Raw Score 10   Low Function Basic Mobility Standardized Score 14 65 Naseem Duke, Pt, DPT

## 2021-01-27 NOTE — PLAN OF CARE
Problem: PHYSICAL THERAPY ADULT  Goal: Performs mobility at highest level of function for planned discharge setting  See evaluation for individualized goals  Description: Treatment/Interventions: Functional transfer training, LE strengthening/ROM, Therapeutic exercise, Endurance training, Bed mobility          See flowsheet documentation for full assessment, interventions and recommendations  Note: Prognosis: Fair  Problem List: Decreased strength, Decreased endurance, Impaired balance, Decreased mobility, Decreased safety awareness, Pain, Orthopedic restrictions  Assessment: Pt is a 57 yo male admitted to [place] [address] on 1/18/2021 s/p wheezing, SOB and weakness  Pt had surgery on 1/13 for embolectomy/thrombectomy R LE and arteriogram  Pt had surgery on 1/14 guillotene amputation AKA on R LE  Surgery on 1/18 completion of R AKA  DX: toxic metabolic encephalopathy, aortic thrombus, HTN, acute hypoxemic respiratory failure secondary to COVID-19, PE, gross hematuria with UTI, anemia, DM, COVID-19, leukocytosis, acute limb ischemic and rhabdomyolysis  Two patient identifiers were used to confirm  Pt is unable to state PLOF  Will need to confirm with CM  Pt's impairments include reduced mobility, high risk of falling, reduced ROM of neck, reduced B LE strength and endurance, confusion, high risk of falling, O2 dependent, reduced activity tolerance  These impairments limit the ability of the patient to perform mobility without increased assistance, return to PLOF and participate in everyday life activities  Pt would benefit from continued skilled therapy while in the hospital to improve overall mobility and work towards a safe d/c  Recommend discharge to rehab  At the end of the session the patient was left in seated position with call bell and phone within reach  The patient's AM-PAC Basic Mobility Inpatient Short Form Low Function Raw Score 10 , Standardized Score is 14 65   A standardized score less 42 9 suggests the patient may benefit from discharge to post-acute rehab services  Please also refer to the recommendation of the Physical Therapist for safe discharge planning  Barriers to Discharge: Inaccessible home environment, Decreased caregiver support     PT Discharge Recommendation: [phone]     PT - OK to Discharge: Yes    See flowsheet documentation for full assessment

## 2021-01-27 NOTE — PROGRESS NOTES
Daily Progress Note - Angeline theodore Carmen Moss [age] y o  male MRN: [id number]  Unit/Bed#: MICU 11 Encounter: [id number]        ----------------------------------------------------------------------------------------  HPI/24hr events: more lethargic/ non focal exam  afebrile    ---------------------------------------------------------------------------------------  SUBJECTIVE  Generalized bodily pain and left sided neck pain  significant sore throat and large volume secretions  weakness in upper extremities  Mouthing words      Review of Systems   Constitutional: Positive for fatigue  Negative for chills and fever  Respiratory: Negative for cough  High secretions, sore throat  Cannot cough   Gastrointestinal:        Having BM   Musculoskeletal:        Generalized body aches   Psychiatric/Behavioral: Positive for dysphoric mood       Review of systems was reviewed and negative unless stated above in HPI/24-hour events   ---------------------------------------------------------------------------------------    Assessment and Plan:     Neuro:   · Diagnosis: Toxic Metabolic Encephalopathy, Sedation, and Pain Control  ? Plan: Mental status has been slowly improving  Patient was alert and able to follow commands appropriately  ? OFF precedex/ dilaudid drips 1/25 after extubated 1/25  ? Cont Gabapentin 400mg TID  Patient remains on Methadone 10mg IV Q8hrs  ? Will defer anesthesia nerve block at this time based upon the patient being a several days out from his AKA with formalization  ? Will attempt to wean patient down on sedation regimen  ? CAM-ICU        CV:   · Diagnosis: Aortic Thrombus  ? Plan: On therapeutic Lovenox  ? Appreciate vascular surgery's input on AKA with formalization  · Diagnosis: Hx HTN  ? Plan: Holding home antihypertensives given patient has had hypotension while here in the MICU   ? OFF Levophed gtt  ? Patient has been hypotensive here in the MICU  Likely in the setting of sedation  Will continue monitoring BP and MAPs in addition to telemetry  ? Goal MAP > 65  Overnight MAPs 54-80      Pulm:  · Diagnosis: Acute Hypoxemic Respiratory Failure Secondary to COVID-19  ? Plan: Intubated on 1/12 for increased work of breathing and hypoxia  ? ventilator settings /6/50% saturating in the 90s  ? Extubated 1/25 to 6L NC then increased to 12L midflow due to desatting with repositioning   ? Attempt to wean patient as tolerated with hopes for extubation in the foreseeable future  ? CXR 1/22/2021 shows stable bilateral infiltrates with a well-placed CVC  ? Continue pulmonary hygiene  · Diagnosis: Pulmonary Embolus  ? Plan: CTA on 1/12/2021 showed single small nonocclusive pulmonary embolus in left lower region  ? Patient is anticoagulated on therapeutic Lovenox         GI:   · Diagnosis: GERD  ? Plan: Protonix Q24  · Diagnosis: Prior Upper GI Bleed  ? Plan: Continue monitoring for signs of bleeding  ? Patient on PPI  ? Continue trending CBCs daily  · Diagnosis: Tube Feeding and Bowel Regimen  § Patient is on Glucerna feeding at goal   § Bowel regimen in place  No recent BMs documented  § Can increase bowel regimen if need be      :   · Diagnosis: Gross Hematuria with Urinary Tract Infection  ? Plan: UA on 1/12/2021 showed blood, no bacteria, no nitrites, and small leukocytes  ? Veras per urology   · Diagnosis: Acute Kidney Injury  ? Plan: Creatinine stable  Baseline  ANNALEE resolved  ? Continue BMP monitoring  ? I&O monitoring         F/E/N:   · Considering diuresing the patient today given wet-sounding cough  Will consider scopolamine as well for potential hypersecretion  · Continue electrolyte monitoring  · Replete electrolytes as indicated         Heme/Onc:   · Diagnosis: Anemia  ? Plan: Hemoglobin 8 1--7 5--9 2--8  5  Continue monitoring  No acute bleeding noticed  ? Patient has required units of PRBCs after AKA with formalization   Will continue to monitor         Endo:   · Diagnosis: T2DM  ? Plan: Patient initially presented with DKA which has since resolved  ? Insulin gtt  Goal GB: 140-180  ? Continue monitoring  Patient has maintained good glucose control overnight         ID:   · Diagnosis: COVID-19 Infection  ? Plan: Patient is on the severe COVID pathway  ? Completed 10 days of Decadron 0 1 mg/kg  ? Completed course of decadron taper  ? Continue vitamin cocktail and statin  ? Remdesavir, convalescent plasma, and Actemra not given  ? Inflammatory Markers:  § 1/22 Procalcitonin 0 75 (down from 0 82)  § 1/14  (down from 279 4)  § 1/14 Ferritin 1860 (up from 1608)  § 1/19 D-Dimer 2 54 (down from 12 77)  · Diagnosis: Leukocytosis  ? Plan: WBC count down tredning  Monitor cbc  ? Trend Fever curve  Consider non infectious etiology for fevers  ? cont Day 4 vancomycin/cefepime persistent fevers/ leukocytosis  ? Negative blood cultures X 48hrs  (1/21)  F/u MRSA and sputum contaminated  ? RIJ CVC removed and replaced 1/22  ? Continue monitoring Veras for potential for infection         MSK/Skin:   · Diagnosis: Acute Limb Ischemia with Rhabdomyolysis  ? Plan:  Duplex of the lower extremities on 1/12 revealed a right femoral deep venous thrombosis  ? Post intubation, the right lower extremity was found to be pale and pulseless  The [facility] to [facility]  ? CT scan revealed filling defect in descending aorta consistent with a thrombus  ? Patient is postop day 10 from a RLE AKA; amputation guillotine style   He is postop day for from formalization of the above the knee amputation  ? Continue to turn and reposition frequently; wound care appreciated  ? Offload pressure points  ? Physical therapy and Occupational therapy consulted  ?  On therapeutic Lovenox        Disposition: Continue Critical Care   Code Status: Level 1 - Full Code    ---------------------------------------------------------------------------------------  ICU CORE MEASURES    Prophylaxis   VTE Pharmacologic Prophylaxis: Enoxaparin (Lovenox)  VTE Mechanical Prophylaxis: sequential compression device  Stress Ulcer Prophylaxis: Pantoprazole IV     ABCDE Protocol (if indicated)  Plan to perform spontaneous awakening trial today? Not applicable  Plan to perform spontaneous breathing trial today? Not applicable  Obvious barriers to extubation? Not applicable  CAM-ICU: Negative    Invasive Devices Review  Invasive Devices     Central Venous Catheter Line            CVC Central Lines 21 Triple 16cm 5 days          Peripheral Intravenous Line            Peripheral IV 21 Right Antecubital less than 1 day    Peripheral IV 21 Right;Ventral (anterior) Forearm less than 1 day          Drain            Urethral Catheter 18 Fr  -- days    NG/OG/Enteral Tube Nasogastric 18 Fr Left nares less than 1 day              Can any invasive devices be discontinued today? Possibly rogers   ---------------------------------------------------------------------------------------  OBJECTIVE    Vitals   Vitals:    21 0300 21 0400 21 0500 21 0532   BP: 146/72 151/71 148/71    BP Location:  Left arm     Pulse: 92 102 90    Resp: (!) 23 (!) 24 19    Temp:  98 °F (36 7 °C)     TempSrc:  Oral     SpO2: 97% 96% 96%    Weight:    79 kg (174 lb 2 6 oz)   Height:         Temp (24hrs), Av 8 °F (36 6 °C), Min:97 6 °F (36 4 °C), Max:98 °F (36 7 °C)  Current: Temperature: 98 °F (36 7 °C)  HR: 90  BP: 148/71  RR: 19  SpO2: 96%    Respiratory:  SpO2: SpO2: 96 %  Nasal Cannula O2 Flow Rate (L/min): 8 L/min    Invasive/non-invasive ventilation settings   Respiratory    Lab Data (Last 4 hours)    None         O2/Vent Data (Last 4 hours)    None                Physical Exam  Vitals signs and nursing note reviewed  Constitutional:       General: He is not in acute distress  Appearance: He is not ill-appearing  HENT:      Head: Normocephalic and atraumatic  Nose: Nose normal       Comments: NGT     Mouth/Throat:      Mouth: Mucous membranes are moist       Comments: Large volume oral secretions  Eyes:      Conjunctiva/sclera: Conjunctivae normal       Pupils: Pupils are equal, round, and reactive to light  Neck:      Comments: RIJ  Tenderness to palpation of left sided neck  Cardiovascular:      Rate and Rhythm: Normal rate and regular rhythm  Pulses: Normal pulses  Heart sounds: Normal heart sounds  Pulmonary:      Effort: Pulmonary effort is normal  No respiratory distress  Comments: 12 L Midflow  Abdominal:      General: Abdomen is flat  Bowel sounds are normal  There is no distension  Palpations: Abdomen is soft  Tenderness: There is abdominal tenderness (generalized)  Musculoskeletal:         General: Swelling and deformity (right lower extremity aka) present  Left lower leg: Edema present  Skin:     General: Skin is warm  Capillary Refill: Capillary refill takes less than 2 seconds  Neurological:      General: No focal deficit present  Mental Status: He is alert  Motor: Weakness present        Comments: Severe deconditioning         Laboratory and Diagnostics:  Results from last 7 days   Lab Units 01/27/21  0501 01/26/21  0529 01/25/21  0436 01/24/21  0447 01/23/21  0434 01/22/21  0435 01/21/21  0547   WBC Thousand/uL 10 37* 15 38* 8 37 7 27 10 63* 13 56* 17 07*   HEMOGLOBIN g/dL 8 5* 9 2* 7 5* 7 5* 8 1* 8 9* 8 6*   HEMATOCRIT % 28 1* 29 4* 23 7* 24 1* 25 5* 27 2* 26 1*   PLATELETS Thousands/uL 193 209 147* 149 177 193 227   NEUTROS PCT %  --   --   --   --  89*  --   --    BANDS PCT %  --   --   --   --   --   --  3   MONOS PCT %  --   --   --   --  4  --   --    MONO PCT %  --   --   --   --   --   --  1*     Results from last 7 days   Lab Units 01/27/21  0512 01/26/21 2004 01/26/21  1422 01/26/21  0529 01/25/21  0436 01/24/21  0447 01/23/21  0434  01/21/21  0547   SODIUM mmol/L 146* 143 143 139 137 139 138   < > 136   POTASSIUM mmol/L 4 1 4 3 3 0* 2 7* 3 5 3 7 3 9   < > 4 0   CHLORIDE mmol/L 117* 113* 112* 109* 106 107 107   < > 103   CO2 mmol/L 25 27 27 24 26 25 26   < > 27   ANION GAP mmol/L 4 3* 4 6 5 7 5   < > 6   BUN mg/dL 30* 28* 26* 26* 33* 32* 27*   < > 25   CREATININE mg/dL 0 57* 0 56* 0 52* 0 60 0 60 0 68 0 55*   < > 0 68   CALCIUM mg/dL 7 8* 7 5* 7 8* 7 7* 7 5* 7 9* 7 6*   < > 7 7*   GLUCOSE RANDOM mg/dL 133 163* 116 167* 139 147* 154*   < > 196*   ALT U/L  --   --   --   --   --   --  52  --  46   AST U/L  --   --   --   --   --   --  43  --  59*   ALK PHOS U/L  --   --   --   --   --   --  148*  --  162*   ALBUMIN g/dL  --   --   --   --   --   --  1 3*  --  1 4*   TOTAL BILIRUBIN mg/dL  --   --   --   --   --   --  0 59  --  0 66    < > = values in this interval not displayed  Results from last 7 days   Lab Units 01/27/21  0512 01/26/21  0529 01/25/21 0436 01/23/21 0434 01/22/21  0516   MAGNESIUM mg/dL 2 4 2 0 2 0 2 0  --    PHOSPHORUS mg/dL 2 5 2 2* 2 7 3 3 3 4      Results from last 7 days   Lab Units 01/20/21  1314   PTT seconds 64*              ABG:  Results from last 7 days   Lab Units 01/25/21  0436   PH ART  7 438   PCO2 ART mm Hg 31 6*   PO2 ART mm Hg 90 4   HCO3 ART mmol/L 20 9*   BASE EXC ART mmol/L -2 8   ABG SOURCE  Line, Arterial     VBG:  Results from last 7 days   Lab Units 01/25/21 0436   ABG SOURCE  Line, Arterial     Results from last 7 days   Lab Units 01/25/21  0436 01/24/21  0447 01/22/21  0606 01/21/21  1235   PROCALCITONIN ng/ml 0 57* 0 85* 0 75* 0 82*       Micro  Results from last 7 days   Lab Units 01/23/21  1735 01/23/21  0721 01/21/21  1401 01/21/21  1320   BLOOD CULTURE   --   --  No Growth After 5 Days  No Growth After 5 Days     SPUTUM CULTURE   --  2+ Growth of   --   --    GRAM STAIN RESULT   --  1+ Epithelial cells per low power field*  1+ Polys*  Rare Gram positive cocci in pairs*  Rare Yeast*  --   --    MRSA CULTURE ONLY  No Methicillin Resistant Staphlyococcus aureus (MRSA) isolated  --   --   --        EKG: n/a  Imaging: n/a    Intake and Output  I/O       01/25 0701 - 01/26 0700 01/26 0701 - 01/27 0700    I V  (mL/kg) 695 3 (8 8) 83 7 (1 1)    NG/ 160    IV Piggyback 50 600    Feedings 271 609    Total Intake(mL/kg) 1346 3 (17 1) 1452 7 (18 4)    Urine (mL/kg/hr) 1510 (0 8) 770 (0 4)    Stool  0    Total Output 1510 770    Net -163 7 +682 7          Unmeasured Stool Occurrence  3 x        UOP: 770 cc over last 24hrs    Height and Weights   Height: 5' 6" (167 6 cm)  IBW: 63 8 kg  Body mass index is 28 11 kg/m²  Weight (last 2 days)     Date/Time   Weight    01/27/21 0532   79 (174 16)    01/26/21 0600   78 6 (173 28)                Nutrition       Diet Orders   (From admission, onward)             Start     Ordered    01/26/21 1411  Diet Enteral/Parenteral; Tube Feeding No Oral Diet; Glucerna 1 2; Continuous; 68; 100; Water; Every 4 hours  Diet effective now     Question Answer Comment   Diet Type Enteral/Parenteral    Enteral/Parenteral Tube Feeding No Oral Diet    Tube Feeding Formula: Glucerna 1 2    Bolus/Cyclic/Continuous Continuous    Tube Feeding Goal Rate (mL/hr): 68    Tube Feeding flush (mL): 100    Water Flush type: Water    Water flush frequency: Every 4 hours    RD to adjust diet per protocol? Yes        01/26/21 1412              TF currently running at 68 ml/hr with a goal of 68 ml/hr   Formula: glucerna 1 2      Active Medications  Scheduled Meds:  Current Facility-Administered Medications   Medication Dose Route Frequency Provider Last Rate    acetaminophen  650 mg Oral Q6H PRN SUSANA CRUZ [facility], MD      bisacodyl  10 mg Rectal Daily PRN Jesus Grimes MD      cholecalciferol  2,000 Units Oral Daily Jesus Grimes MD      Diclofenac Sodium  4 g Topical 4x Daily SUSANA CRUZ [facility], MD      enoxaparin  1 mg/kg Subcutaneous Q12H [address] Stephanie Esparza [place], CRNP      gabapentin  400 mg Oral TID [facility], CRNP      glycopyrrolate  1 mg Oral TID Steve , VERONICA      hydrocortisone sodium succinate  50 mg Intravenous Q6H [address] Tan Trevino PA-C      HYDROmorphone  0 5 mg Intravenous Q3H PRN Steve Jackson, STONENP      Or    HYDROmorphone  0 5 mg Intravenous Q3H PRN Steve Jackson, VERONICA      insulin regular (HumuLIN R,NovoLIN R) infusion  0 3-21 Units/hr Intravenous Titrated Josie Bermeo MD 3 Units/hr (01/27/21 0612)    levalbuterol  1 25 mg Nebulization TID Gio Head DO      Lidocaine Viscous HCl  15 mL Swish & Spit 4x Daily PRN Josie Bermeo MD      menthol-methyl salicylate   Apply externally 4x Daily PRN Josie Bermeo MD      methadone  10 mg Intravenous [facility] [address] Tarun Irby PA-C      multivitamin-minerals  1 tablet Oral Daily Rosa Womack MD      pantoprazole  40 mg Intravenous Q24H [address] VERONICA Silva      phenol  1 spray Mouth/Throat Q2H PRN Steve Jackson, VERONICA      polyethylene glycol  17 g Oral Daily Rosa Womack MD      senna  1 tablet Oral BID Josie Bermeo MD      sodium chloride  3 mL Nebulization TID Gio Head DO       Continuous Infusions:  insulin regular (HumuLIN R,NovoLIN R) infusion, 0 3-21 Units/hr, Last Rate: 3 Units/hr (01/27/21 0612)      PRN Meds:   acetaminophen, 650 mg, Q6H PRN  bisacodyl, 10 mg, Daily PRN  HYDROmorphone, 0 5 mg, Q3H PRN    Or  HYDROmorphone, 0 5 mg, Q3H PRN  Lidocaine Viscous HCl, 15 mL, 4x Daily PRN  menthol-methyl salicylate, , 4x Daily PRN  phenol, 1 spray, Q2H PRN        Allergies   Allergies   Allergen Reactions    Varenicline      ---------------------------------------------------------------------------------------  Advance Directive and Living Will:      Power of :    POLST:    ---------------------------------------------------------------------------------------  Care Time Delivered:   Louis Heart MD      Portions of the record may have been created with voice recognition software  Occasional wrong word or "sound a like" substitutions may have occurred due to the inherent limitations of voice recognition software    Read the chart carefully and recognize, using context, where substitutions have occurred

## 2021-01-27 NOTE — OCCUPATIONAL THERAPY NOTE
Occupational Therapy Evaluation     Patient Name: Carlos Ramos  [facility]'S Date: 1/27/2021  Problem List  Principal Problem:    Pneumonia due to COVID-19 virus  Active Problems:    DM2 (diabetes mellitus, type 2) ([place] [address] )    Acute metabolic encephalopathy    Acute respiratory failure with hypoxia ([place] [address] )    Sepsis due to COVID-19 [facility])    Aortic thrombus (HCC)    Anemia    Hypokalemia    Past Medical History  Past Medical History:   Diagnosis Date    Diabetes mellitus ([place] [address] )     GERD (gastroesophageal reflux disease)     Hypercholesteremia     Hypertension     Methadone use ([place] [address] )      Past Surgical History  Past Surgical History:   Procedure Laterality Date    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    Procedure: AMPUTATION ABOVE KNEE (AKA); Surgeon: Marianela Friedman MD;  [facility];  Service: Vascular    AMPUTATION ABOVE KNEE (AKA) Right 1/18/2021    Procedure: AMPUTATION ABOVE KNEE (AKA) FORMALIZATION,  R AKA wound washout, wound closure;  Surgeon: Marianela Friedman MD;  [facility];  Service: Vascular    ARTERIOGRAM Right 1/13/2021    Procedure: ARTERIOGRAM;  Surgeon: Brad Long DO;  [facility];  Service: Vascular    IR LOWER EXTREMITY ANGIOGRAM  1/14/2021    THROMBECTOMY W/ EMBOLECTOMY Right 1/13/2021    Procedure: EMBOLECTOMY/THROMBECTOMY LOWER EXTREMITY;  Surgeon: Brad Long DO;  [facility];  Service: Vascular             01/27/21 1119   OT Last Visit   OT Visit Date 01/27/21   Note Type   Note type Evaluation   Restrictions/Precautions   Weight Bearing Precautions Per Order Yes   RLE Weight Bearing Per Order NWB  (s/p AKA)   Other Precautions Contact/isolation; Airborne/isolation;Cognitive; Bed Alarm;WBS;Multiple lines;Telemetry;O2;Pain; Fall Risk;Visual impairment  (HFNC, COVID (+))   Pain Assessment   Pain Assessment Tool Pain Assessment not indicated - pt denies pain   Pain Score Worst Possible Pain   Pain Location/Orientation Orientation: Right; Location: Leg   Hospital Pain Intervention(s) Repositioned; Emotional support   Home Living   Type of [address] Multi-level;Performs ADLs on one level;Bed/bath upstairs  (3 FRADNY)   Home Equipment Other (Comment)  (denies any)   Additional Comments Pt is minimally verbal @ this time; attempted to state 1 incomprehensible word  Pt is a questionable historian; hx obtained from chart review  Pt was living in 3 SH, 3 FRANDY w/ his 2 siblings and 2 nephews  1 of his siblings he lives w/ is also in the hospital w/ COVID  Pt has bed/bath 2nd floor and no 1st floor setup  Prior Function   Level of [place] Independent with ADLs and functional mobility   Lives With Family  (siblings and nephews)   Receives Help From Family   ADL Assistance Independent   IADLs Independent   Vocational Full time employment   Comments Per chart review, pt was I w/ ADLS/IADLS, transfers and functional mobility PTA   Lifestyle   Autonomy I ADLS/IADLS, transfers and functional mobility PTA   Reciprocal Relationships Pt lives w/ his siblings and nephews   Service to Others Pt works full time   Intrinsic Gratification Unable to report @ this time   Psychosocial   Psychosocial (WDL) X   Patient Behaviors/Mood Withdrawn   ADL   Eating Assistance Unable to assess   Grooming Assistance 1  Total Assistance   UB Bathing Assistance 1  Total Assistance   LB Bathing Assistance 1  Total Assistance   [address] 1  Total Assistance   LB Dressing Assistance 1  Total Assistance   LB Dressing Deficit Don/doff L sock; Don/doff R sock   Toileting Assistance  1  Total Assistance   Functional Assistance 2  Maximal Assistance   Functional Deficit Steadying;Verbal cueing;Supervision/safety; Increased time to complete  (bed-level)   Bed Mobility   Supine to Sit 2  Maximal assistance   Additional items Assist x 1; Increased time required;Verbal cues; Assist x 2   Sit to Supine 2  Maximal assistance   Additional items Assist x 1; Increased time required;Verbal cues; Assist x 2   Additional Comments Pt performed long sit in bed w/ Max A x2 for initiation; able to maintain upright w/ Max A x1; hand over hand assist to use B/L UE's to grab bed rails for support  Pt presenting w/ poor ; unable to maintain midline w/o assistance; presents w/ poor head/neck control  Transfers   Sit to Stand Unable to assess   Stand to Sit Unable to assess   Additional Comments Not appropriate @ this time   Functional Mobility   Additional Comments Not appropriate @ this time   Balance   Static Sitting Poor -   Dynamic Sitting Poor -   Static Standing Zero   Activity Tolerance   Activity Tolerance Patient limited by fatigue;Patient limited by pain;Treatment limited secondary to medical complications (Comment)   Medical Staff Made Aware PT   Nurse Made Aware yes, Lenore   RUE Assessment   RUE Assessment X  (grossly 1/5)   LUE Assessment   LUE Assessment X  (grossly 2-/5)   Hand Function   Gross Motor Coordination Impaired   Fine Motor Coordination Impaired   Vision - Complex Assessment   Head Position Head turned; Other (Comment); Head tilt; Chin down  (forward flexed; L tilt)   Tracking Unable to test secondary to decreased visual attention   Saccades Unable to test secondary to decreased visual attention   Convergence Unable to test secondary to decreased visual attention   Visual Fields Unable to test secondary due to decreased visual attention   Perception   Inattention/Neglect Cues to maintain midline in sitting   Cognition   Overall Cognitive Status Impaired   Arousal/Participation Arousable;Lethargic;Poorly responsive   Attention Difficulty attending to directions   Orientation Level Oriented to person;Oriented to place; Disoriented to time;Disoriented to situation   Memory Unable to assess   Following Commands Follows one step commands inconsistently   Comments Pt is very lethargic; minimally verbal (attempted to state 1 word @ end of session that was incomprehensible); appears to have decreased understanding of deficits/safety awareness  Attempts to head nod yes/no to questions but is an inconsistent historian  Assessment   Limitation Decreased ADL status; Decreased Safe judgement during ADL;Decreased UE strength;Decreased UE ROM; Decreased cognition;Decreased endurance;Decreased self-care trans;Decreased high-level ADLs; Decreased sensation;Decreased fine motor control;Visual deficit   Prognosis Fair   Assessment Pt is a 57 y/o male seen for OT eval s/p adm to [facility] for evaluation by vascular surgery due to acute limb ischemia of R LE 2/2 emboli from aortic thrombus to the limb  Pt initially presented to [facility] on 1/1 w/ N/V, SOB and altered mental status  Pt found to be in DKA and transferred to [facility]  Pt developed worsening respiratory status and was intubated 1/12 and found to have acute DVT of R LE and PE  Pt is dx'd w/ penumonia due to COVID-19 virus  Pt is s/p "EMBOLECTOMY/THROMBECTOMY LOWER EXTREMITY (Right) ARTERIOGRAM (Right)" performed on 1/13 and s/p "GUILLOTENE AMPUTATION ABOVE KNEE (AKA) (Right)" performed on 1/14; pt s/p completion of R AKA on 1/18  Pt is NWB in R LE  Pt  has a past medical history of Diabetes mellitus ([facility] [address] ), GERD (gastroesophageal reflux disease), Hypercholesteremia, Hypertension, and Methadone use ([facility] [address] )  Pt with active OT orders and up with assistance  orders  Pt lives w/ his siblings (2) and newphew's (2) in 3 SH, 3 FRANDY; bed/bath 2nd floor, no bathroom on 1st floor  Pt was I w/ ADLS and IADLS, drove, & required no use of DME PTA  Pt is currently demonstrating the following occupational deficits: Total A UB/LB ADLS, Max Ax 2 for long sitting in bed; not appropriate for further functional mobility @ this time   These deficits that are impacting pt's baseline areas of occupation are a result of the following impairments: pain, endurance, activity tolerance, functional mobility, forward functional reach, balance, trunk control, functional standing tolerance, unsupportive home environment, decreased I w/ ADLS/IADLS, strength, ROM, visual deficits, cognitive impairments, decreased safety awareness, decreased insight into deficits, impaired fine motor skills and coordination deficits  The following Occupational Performance Areas to address include: eating, grooming, bathing/shower, toilet hygiene, dressing, medication management, socialization, health maintenance, functional mobility and clothing management  Pt scored overall 10/100 on the Barthel Index  Based on the aforementioned OT evaluation, functional performance deficits, and assessments, pt has been identified as a high complexity evaluation  Recommend STR upon D/C,when medically stable  The patient's raw score on the AM-PAC Daily Activity inpatient short form low functional score is 10  standardized score is 17 31, less than 39 4  Patients at this level are likely to benefit from DC to post-acute rehabilitation services  Please refer to the recommendation of the Occupational Therapist for safe DC planning  Pt to continue to benefit from acute immediate OT services to address the following goals 2-3x/wk to  within the next 10-14 days:   Goals   Patient Goals unable to report   LTG Time Frame 10-14   Long Term Goal #1 see below listed goals   Plan   Treatment Interventions ADL retraining;Visual perceptual retraining;Functional transfer training;UE strengthening/ROM; Endurance training;Cognitive reorientation;Patient/family training;Equipment evaluation/education; Compensatory technique education; Fine motor coordination activities;Continued evaluation; Energy conservation; Activityengagement   Goal Expiration Date 02/10/21   OT Frequency 2-3x/wk   Recommendation   OT Discharge Recommendation Post-Acute Rehabilitation Services   OT - OK to Discharge Yes  (when medically stable)   AM-PAC Daily Activity Inpatient   Lower Body Dressing 1   Bathing 1   Toileting 1   Upper Body Dressing 1   Grooming 1 Eating 1   Daily Activity Raw Score 6   Turning Head Towards Sound 2   Follow Simple Instructions 2   Low Function Daily Activity Raw Score 10   Low Function Daily Activity Standardized Score 17 31   AM-PAC Applied Cognition Inpatient   Following a Speech/Presentation 2   Understanding Ordinary Conversation 2   Taking Medications 1   Remembering Where Things Are Placed or Put Away 1   Remembering List of 4-5 Errands 1   Taking Care of Complicated Tasks 1   Applied Cognition Raw Score 8   Applied Cognition Standardized Score 19 32   Barthel Index   Feeding 0   Bathing 0   Grooming Score 0   Dressing Score 0   Bladder Score 0   Bowels Score 0   Toilet Use Score 5   Transfers (Bed/Chair) Score 5   Mobility (Level Surface) Score 0   Stairs Score 0   Barthel Index Score 10          GOALS    1) Pt will complete rolling left/right in bed with Mod assist, as prerequisite for further engagement in ADLS   2) Pt will complete supine to sit transfer with Mod A using B/L UEs to initiate bed mobility   3) Pt will tolerate sitting at EOB 20 minutes with Mod assist and stable vital signs, as prerequisite for further engagement in ADLS   4) Pt will complete grooming task with Mod assist and increased time to increase independence in functional tasks  5) Pt will increase B/L UE ROM 1/2 MMT to increase functional UB use during ADLS   6) Pt will complete UB ADLS with Mod A and use of AD/DME as needed to increase independence in functional tasks  7) Pt will complete LB ADLS with Mod A and use of AD/DME as needed to increase independence in functional tasks  8) Pt will follow 100% simple one step verbal commands and be A/Ox4 consistently t/o use of external environmental cues to increase awareness for functional tasks  9) Pt will demonstrate 100% carryover of E C  techniques t/o fx'l I/ADL/ leisure tasks w/o cues s/p skilled education  10) Pt will engage in ongoing  assessments, screens, and activities t/o fx'l I/ADL/leisure tasks w/ G participation to A w/ adaptation and accomodations or rule out visual perceptual impairments  11) Pt will attend to functional task/activity for 10 mins w/ no more than 2 VC for re-direction to increase participation/attention in meaningful tasks    ** OTR to assess transfers/functional mobility when appropriate    Jordana Shirley MS, OTR/L

## 2021-01-27 NOTE — PLAN OF CARE
Problem: OCCUPATIONAL THERAPY ADULT  Goal: Performs self-care activities at highest level of function for planned discharge setting  See evaluation for individualized goals  Description: Treatment Interventions: ADL retraining, Visual perceptual retraining, Functional transfer training, UE strengthening/ROM, Endurance training, Cognitive reorientation, Patient/family training, Equipment evaluation/education, Compensatory technique education, Fine motor coordination activities, Continued evaluation, Energy conservation, Activityengagement          See flowsheet documentation for full assessment, interventions and recommendations  Note: Limitation: Decreased ADL status, Decreased Safe judgement during ADL, Decreased UE strength, Decreased UE ROM, Decreased cognition, Decreased endurance, Decreased self-care trans, Decreased high-level ADLs, Decreased sensation, Decreased fine motor control, Visual deficit  Prognosis: Fair  Assessment: Pt is a 55 y/o male seen for OT eval s/p adm to [facility] for evaluation by vascular surgery due to acute limb ischemia of R LE 2/2 emboli from aortic thrombus to the limb  Pt initially presented to [facility] on 1/1 w/ N/V, SOB and altered mental status  Pt found to be in DKA and transferred to [facility]  Pt developed worsening respiratory status and was intubated 1/12 and found to have acute DVT of R LE and PE  Pt is dx'd w/ penumonia due to COVID-19 virus  Pt is s/p "EMBOLECTOMY/THROMBECTOMY LOWER EXTREMITY (Right) ARTERIOGRAM (Right)" performed on 1/13 and s/p "GUILLOTENE AMPUTATION ABOVE KNEE (AKA) (Right)" performed on 1/14; pt s/p completion of R AKA on 1/18  Pt is NWB in R LE  Pt  has a past medical history of Diabetes mellitus ([facility] [address] ), GERD (gastroesophageal reflux disease), Hypercholesteremia, Hypertension, and Methadone use ([facility] [address] )  Pt with active OT orders and up with assistance  orders   Pt lives w/ his siblings (2) and newphew's (2) in 3 SH, 3 FRANDY; bed/bath 2nd floor, no bathroom on 1st floor  Pt was I w/ ADLS and IADLS, drove, & required no use of DME PTA  Pt is currently demonstrating the following occupational deficits: Total A UB/LB ADLS, Max Ax 2 for long sitting in bed; not appropriate for further functional mobility @ this time  These deficits that are impacting pt's baseline areas of occupation are a result of the following impairments: pain, endurance, activity tolerance, functional mobility, forward functional reach, balance, trunk control, functional standing tolerance, unsupportive home environment, decreased I w/ ADLS/IADLS, strength, ROM, visual deficits, cognitive impairments, decreased safety awareness, decreased insight into deficits, impaired fine motor skills and coordination deficits  The following Occupational Performance Areas to address include: eating, grooming, bathing/shower, toilet hygiene, dressing, medication management, socialization, health maintenance, functional mobility and clothing management  Pt scored overall 10/100 on the Barthel Index  Based on the aforementioned OT evaluation, functional performance deficits, and assessments, pt has been identified as a high complexity evaluation  Recommend STR upon D/C,when medically stable  The patient's raw score on the AM-PAC Daily Activity inpatient short form low functional score is 10  standardized score is 17 31, less than 39 4   Patients at this level are likely to benefit from DC to post-acute rehabilitation services      OT Discharge Recommendation: [address]  OT - OK to Discharge: Yes(when medically stable)     Sang Hernandez MS, OTR/L

## 2021-01-27 NOTE — PLAN OF CARE
Problem: Prexisting or High Potential for Compromised Skin Integrity  Goal: Skin integrity is maintained or improved  Description: INTERVENTIONS:  - Identify patients at risk for skin breakdown  - Assess and monitor skin integrity  - Assess and monitor nutrition and hydration status  - Monitor labs   - Assess for incontinence   - Turn and reposition patient  - Assist with mobility/ambulation  - Relieve pressure over bony prominences  - Avoid friction and shearing  - Provide appropriate hygiene as needed including keeping skin clean and dry  - Evaluate need for skin moisturizer/barrier cream  - Collaborate with interdisciplinary team   - Patient/family teaching  - Consider wound care consult   Outcome: Progressing     Problem: Potential for Falls  Goal: Patient will remain free of falls  Description: INTERVENTIONS:  - Assess patient frequently for physical needs  -  Identify cognitive and physical deficits and behaviors that affect risk of falls    -  [place] fall precautions as indicated by assessment   - Educate patient/family on patient safety including physical limitations  - Instruct patient to call for assistance with activity based on assessment  - Modify environment to reduce risk of injury  - Consider OT/PT consult to assist with strengthening/mobility  Outcome: Progressing

## 2021-01-27 NOTE — SPEECH THERAPY NOTE
Speech Language/Pathology  Attempted to see pt for ongoing dysphagia assessment  Per nsg minimal change from the initial eval w/ lethargy & ongoing drooling  Pt now has a NG in place for nutrition  SLP will check pt status for appropriateness as able

## 2021-01-27 NOTE — WOUND OSTOMY CARE
Progress Note - Wound   James Gutierrez 56 y o  male MRN: [id number]  Unit/Bed#: MICU 11 Encounter: [id number]        Assessment:   Patient seen this morning for continued skin and wound care  He is in bed sleeping, primary RN at bed side assisting wound care nurse with skin re-assessment  Findings  1-Sacral DTI fully evolved wound bed is 60% pale pink with 40% central deep purple remaining  Wound is non indurated with non macerated periwound  Patient is still incontinent of stool  2-R index finger DTI remains stable, non indurated, likely to peel off as dried skin  Skin care plans:   1-Hydraguard to bilateral sacrum, buttock BID and PRN   2-Elevate heels to offload pressure  3-Ehob cushion in chair when out of bed  4-Moisturize skin daily with skin nourishing cream    5-Turn/reposition q2h or when medically stable for pressure re-distribution on skin  6-Allevyn foam to heel, juan w/P, peel foam check skin integrity q-shift  Change q3d  Vitals: Blood pressure 147/64, pulse 96, temperature (P) 99 °F (37 2 °C), temperature source (P) Oral, resp  rate 21, height 5' 6" (1 676 m), weight 79 kg (174 lb 2 6 oz), SpO2 95 %  ,Body mass index is 28 11 kg/m²  Wound 01/12/21 Pressure Injury Sacrum Mid (Active)   Wound Image    01/27/21 1252   Wound Description Non-blanchable erythema;Light purple;Slough;Fragile 01/27/21 1231   Pressure Injury Stage DTPI 01/27/21 1231   Renuka-wound Assessment Erythema;Fragile 01/27/21 1231   Wound Length (cm) 9 cm 01/27/21 1252   Wound Width (cm) 5 8 cm 01/27/21 1252   Wound Surface Area (cm^2) 52 2 cm^2 01/27/21 1252   Drainage Amount Scant 01/27/21 1231   Drainage Description Bloody 01/27/21 1231   Treatments Elevated 01/27/21 1231   Dressing Open to air 01/27/21 1231   Patient Tolerance Tolerated well 01/26/21 1605   Dressing Status Clean;Dry; Intact 01/20/21 0800   Wound 01/17/21 Pressure Injury Scrotum (Active)   Wound Description Intact 01/27/21 1231   Pressure Injury Stage DTPI 01/24/21 1200   Renuka-wound Assessment Clean;Dry; Intact 01/27/21 1231   Wound Site Closure None 01/24/21 1555   Drainage Amount None 01/27/21 1231   Treatments Cleansed;Site care 01/27/21 0829   Dressing Open to air 01/27/21 1231   Wound 01/19/21 Pressure Injury Finger (Comment which one) Right;Posterior (Active)   Wound Image   01/27/21 1258   Wound Description Clean;Dry; Intact 01/27/21 1231   Pressure Injury Stage DTPI 01/27/21 1231   Renuka-wound Assessment Clean;Dry; Intact 01/27/21 1231   Wound Length (cm) 1 5 cm 01/27/21 1258   Wound Width (cm) 2 cm 01/27/21 1258   Wound Surface Area (cm^2) 3 cm^2 01/27/21 1258   Wound Site Closure None 01/24/21 1555   Drainage Amount None 01/24/21 1555   Treatments Cleansed;Site care 01/26/21 2000   Dressing Open to air 01/27/21 1231         Our skin care recommendations remains as nursing orders, wound care to continue following, please toger text team with questions and concerns        Lanre Anne, RN, BSN, [facility]

## 2021-01-28 LAB
ANION GAP SERPL CALCULATED.3IONS-SCNC: 4 MMOL/L (ref 4–13)
BASOPHILS # BLD AUTO: 0 THOUSANDS/ΜL (ref 0–0.1)
BASOPHILS NFR BLD AUTO: 0 % (ref 0–1)
BUN SERPL-MCNC: 27 MG/DL (ref 5–25)
CALCIUM SERPL-MCNC: 7.8 MG/DL (ref 8.3–10.1)
CHLORIDE SERPL-SCNC: 115 MMOL/L (ref 100–108)
CO2 SERPL-SCNC: 27 MMOL/L (ref 21–32)
CREAT SERPL-MCNC: 0.6 MG/DL (ref 0.6–1.3)
EOSINOPHIL # BLD AUTO: 0.01 THOUSAND/ΜL (ref 0–0.61)
EOSINOPHIL NFR BLD AUTO: 0 % (ref 0–6)
ERYTHROCYTE [DISTWIDTH] IN BLOOD BY AUTOMATED COUNT: 17.9 % (ref 11.6–15.1)
GFR SERPL CREATININE-BSD FRML MDRD: 108 ML/MIN/1.73SQ M
GLUCOSE SERPL-MCNC: 282 MG/DL (ref 65–140)
GLUCOSE SERPL-MCNC: 297 MG/DL (ref 65–140)
GLUCOSE SERPL-MCNC: 299 MG/DL (ref 65–140)
GLUCOSE SERPL-MCNC: 305 MG/DL (ref 65–140)
HCT VFR BLD AUTO: 28.8 % (ref 36.5–49.3)
HGB BLD-MCNC: 8.4 G/DL (ref 12–17)
IMM GRANULOCYTES # BLD AUTO: 0.05 THOUSAND/UL (ref 0–0.2)
IMM GRANULOCYTES NFR BLD AUTO: 1 % (ref 0–2)
LYMPHOCYTES # BLD AUTO: 0.87 THOUSANDS/ΜL (ref 0.6–4.47)
LYMPHOCYTES NFR BLD AUTO: 10 % (ref 14–44)
MCH RBC QN AUTO: 29.2 PG (ref 26.8–34.3)
MCHC RBC AUTO-ENTMCNC: 29.2 G/DL (ref 31.4–37.4)
MCV RBC AUTO: 100 FL (ref 82–98)
MONOCYTES # BLD AUTO: 0.39 THOUSAND/ΜL (ref 0.17–1.22)
MONOCYTES NFR BLD AUTO: 4 % (ref 4–12)
NEUTROPHILS # BLD AUTO: 7.51 THOUSANDS/ΜL (ref 1.85–7.62)
NEUTS SEG NFR BLD AUTO: 85 % (ref 43–75)
NRBC BLD AUTO-RTO: 0 /100 WBCS
PLATELET # BLD AUTO: 172 THOUSANDS/UL (ref 149–390)
PMV BLD AUTO: 11 FL (ref 8.9–12.7)
POTASSIUM SERPL-SCNC: 3.6 MMOL/L (ref 3.5–5.3)
RBC # BLD AUTO: 2.88 MILLION/UL (ref 3.88–5.62)
SODIUM SERPL-SCNC: 146 MMOL/L (ref 136–145)
WBC # BLD AUTO: 8.83 THOUSAND/UL (ref 4.31–10.16)

## 2021-01-28 PROCEDURE — 82948 REAGENT STRIP/BLOOD GLUCOSE: CPT

## 2021-01-28 PROCEDURE — 94760 N-INVAS EAR/PLS OXIMETRY 1: CPT

## 2021-01-28 PROCEDURE — 99233 SBSQ HOSP IP/OBS HIGH 50: CPT | Performed by: INTERNAL MEDICINE

## 2021-01-28 PROCEDURE — 80048 BASIC METABOLIC PNL TOTAL CA: CPT | Performed by: EMERGENCY MEDICINE

## 2021-01-28 PROCEDURE — 94668 MNPJ CHEST WALL SBSQ: CPT

## 2021-01-28 PROCEDURE — 85025 COMPLETE CBC W/AUTO DIFF WBC: CPT | Performed by: EMERGENCY MEDICINE

## 2021-01-28 PROCEDURE — 4010F ACE/ARB THERAPY RXD/TAKEN: CPT | Performed by: FAMILY MEDICINE

## 2021-01-28 PROCEDURE — 94640 AIRWAY INHALATION TREATMENT: CPT

## 2021-01-28 RX ORDER — INSULIN GLARGINE 100 [IU]/ML
30 INJECTION, SOLUTION SUBCUTANEOUS EVERY 12 HOURS SCHEDULED
Status: DISCONTINUED | OUTPATIENT
Start: 2021-01-28 | End: 2021-01-29

## 2021-01-28 RX ORDER — LISINOPRIL 10 MG/1
10 TABLET ORAL DAILY
Status: DISCONTINUED | OUTPATIENT
Start: 2021-01-28 | End: 2021-02-25 | Stop reason: HOSPADM

## 2021-01-28 RX ORDER — INSULIN GLARGINE 100 [IU]/ML
30 INJECTION, SOLUTION SUBCUTANEOUS
Status: DISCONTINUED | OUTPATIENT
Start: 2021-01-28 | End: 2021-01-28

## 2021-01-28 RX ADMIN — ISODIUM CHLORIDE 3 ML: 0.03 SOLUTION RESPIRATORY (INHALATION) at 13:39

## 2021-01-28 RX ADMIN — INSULIN GLARGINE 30 UNITS: 100 INJECTION, SOLUTION SUBCUTANEOUS at 21:28

## 2021-01-28 RX ADMIN — LEVALBUTEROL HYDROCHLORIDE 1.25 MG: 1.25 SOLUTION, CONCENTRATE RESPIRATORY (INHALATION) at 07:49

## 2021-01-28 RX ADMIN — LEVALBUTEROL HYDROCHLORIDE 1.25 MG: 1.25 SOLUTION, CONCENTRATE RESPIRATORY (INHALATION) at 19:46

## 2021-01-28 RX ADMIN — LEVALBUTEROL HYDROCHLORIDE 1.25 MG: 1.25 SOLUTION, CONCENTRATE RESPIRATORY (INHALATION) at 13:39

## 2021-01-28 RX ADMIN — GABAPENTIN 400 MG: 400 CAPSULE ORAL at 16:45

## 2021-01-28 RX ADMIN — POLYETHYLENE GLYCOL 3350 17 G: 17 POWDER, FOR SOLUTION ORAL at 09:43

## 2021-01-28 RX ADMIN — GLYCOPYRROLATE 1 MG: 1 TABLET ORAL at 16:45

## 2021-01-28 RX ADMIN — HYDROCORTISONE SODIUM SUCCINATE 25 MG: 100 INJECTION, POWDER, FOR SOLUTION INTRAMUSCULAR; INTRAVENOUS at 05:42

## 2021-01-28 RX ADMIN — Medication 1 TABLET: at 09:43

## 2021-01-28 RX ADMIN — METHADONE HYDROCHLORIDE 23 MG: 10 CONCENTRATE ORAL at 21:27

## 2021-01-28 RX ADMIN — ISODIUM CHLORIDE 3 ML: 0.03 SOLUTION RESPIRATORY (INHALATION) at 07:49

## 2021-01-28 RX ADMIN — METHADONE HYDROCHLORIDE 23 MG: 10 CONCENTRATE ORAL at 05:47

## 2021-01-28 RX ADMIN — DICLOFENAC SODIUM 4 G: 10 GEL TOPICAL at 17:57

## 2021-01-28 RX ADMIN — ISODIUM CHLORIDE 3 ML: 0.03 SOLUTION RESPIRATORY (INHALATION) at 19:46

## 2021-01-28 RX ADMIN — GLYCOPYRROLATE 1 MG: 1 TABLET ORAL at 09:42

## 2021-01-28 RX ADMIN — SENNOSIDES 8.6 MG: 8.6 TABLET, FILM COATED ORAL at 17:58

## 2021-01-28 RX ADMIN — SENNOSIDES 8.6 MG: 8.6 TABLET, FILM COATED ORAL at 09:43

## 2021-01-28 RX ADMIN — HYDROCORTISONE SODIUM SUCCINATE 25 MG: 100 INJECTION, POWDER, FOR SOLUTION INTRAMUSCULAR; INTRAVENOUS at 12:16

## 2021-01-28 RX ADMIN — GABAPENTIN 400 MG: 400 CAPSULE ORAL at 21:27

## 2021-01-28 RX ADMIN — LISINOPRIL 10 MG: 10 TABLET ORAL at 12:16

## 2021-01-28 RX ADMIN — DICLOFENAC SODIUM 4 G: 10 GEL TOPICAL at 09:42

## 2021-01-28 RX ADMIN — HYDROCORTISONE SODIUM SUCCINATE 25 MG: 100 INJECTION, POWDER, FOR SOLUTION INTRAMUSCULAR; INTRAVENOUS at 17:58

## 2021-01-28 RX ADMIN — DICLOFENAC SODIUM 4 G: 10 GEL TOPICAL at 12:15

## 2021-01-28 RX ADMIN — INSULIN LISPRO 4 UNITS: 100 INJECTION, SOLUTION INTRAVENOUS; SUBCUTANEOUS at 12:16

## 2021-01-28 RX ADMIN — ENOXAPARIN SODIUM 80 MG: 80 INJECTION SUBCUTANEOUS at 12:15

## 2021-01-28 RX ADMIN — Medication 2000 UNITS: at 09:42

## 2021-01-28 RX ADMIN — GABAPENTIN 400 MG: 400 CAPSULE ORAL at 09:42

## 2021-01-28 RX ADMIN — Medication 20 MG: at 05:45

## 2021-01-28 RX ADMIN — INSULIN LISPRO 4 UNITS: 100 INJECTION, SOLUTION INTRAVENOUS; SUBCUTANEOUS at 05:54

## 2021-01-28 RX ADMIN — INSULIN LISPRO 4 UNITS: 100 INJECTION, SOLUTION INTRAVENOUS; SUBCUTANEOUS at 17:58

## 2021-01-28 NOTE — PROGRESS NOTES
Daily Progress Note - Angeline theodore Carmen Moss [age] y o  male MRN: [id number]  Unit/Bed#: MICU 11 Encounter: [id number]        ----------------------------------------------------------------------------------------  HPI/24hr events: none  Passed voiding trial  Some red tinged urine with some clots    ---------------------------------------------------------------------------------------  SUBJECTIVE  No complaints  Oriented X3  Sore throat and neck pain feeling better  Unable to say words  8L Midflow    Review of Systems   Constitutional: Negative  HENT: Negative  Eyes: Negative  Respiratory: Negative  Cardiovascular: Negative  Gastrointestinal: Negative  Endocrine: Negative  Genitourinary: Negative  Musculoskeletal: Negative  Skin: Negative  Allergic/Immunologic: Negative  Neurological: Negative  Ros negative except hpi  ---------------------------------------------------------------------------------------  Assessment and Plan:     Neuro:   · Diagnosis: Toxic Metabolic Encephalopathy, Sedation, and Pain Control  ? Plan: Mental status has been slowly improving  Patient was alert and able to follow commands appropriately  ? OFF precedex/ dilaudid drips 1/25 after extubated 1/25  ? Cont Gabapentin 400mg TID  Appreciate recs per acute pain  ? Will defer anesthesia nerve block at this time based upon the patient being a several days out from his AKA with formalization  ? Will attempt to wean patient down on sedation regimen  ? CAM-ICU        CV:   · Diagnosis: Aortic Thrombus  ? Plan: On therapeutic Lovenox  ? Appreciate vascular surgery's input on AKA with formalization  · Diagnosis: Hx HTN  ? Plan: Holding home antihypertensives given patient has had hypotension while here in the MICU   ? OFF Levophed gtt  ? Patient has been hypotensive here in the MICU  Likely in the setting of sedation  Will continue monitoring BP and MAPs in addition to telemetry  ? Goal MAP > 65  Overnight MAPs 54-80      Pulm:  · Diagnosis: Acute Hypoxemic Respiratory Failure Secondary to COVID-19  ? Plan: Intubated on 1/12 for increased work of breathing and hypoxia  ? ventilator settings /6/50% saturating in the 90s  ? Extubated 1/25 to 6L NC then increased to 12L midflow due to desatting with repositioning   ? Attempt to wean patient as tolerated with hopes for extubation in the foreseeable future  ? CXR 1/22/2021 shows stable bilateral infiltrates with a well-placed CVC  ? Continue pulmonary hygiene  · Diagnosis: Pulmonary Embolus  ? Plan: CTA on 1/12/2021 showed single small nonocclusive pulmonary embolus in left lower region  ? Patient is anticoagulated on therapeutic Lovenox         GI:   · Diagnosis: GERD  ? Plan: Protonix Q24  · Diagnosis: Prior Upper GI Bleed  ? Plan: Continue monitoring for signs of bleeding  ? Patient on PPI  ? Continue trending CBCs daily  · Diagnosis: Tube Feeding and Bowel Regimen  § Patient is on Glucerna feeding at goal   § Bowel regimen in place  No recent BMs documented  § Can increase bowel regimen if need be      :   · Diagnosis: Gross Hematuria with Urinary Tract Infection  ? Plan: UA on 1/12/2021 showed blood, no bacteria, no nitrites, and small leukocytes  ? Veras per urology   · Diagnosis: Acute Kidney Injury  ? Plan: Creatinine stable  Baseline  ANNALEE resolved  ? Continue BMP monitoring  ? I&O monitoring         F/E/N:   · Considering diuresing the patient today given wet-sounding cough  Will consider scopolamine as well for potential hypersecretion  · Continue electrolyte monitoring  · Replete electrolytes as indicated         Heme/Onc:   · Diagnosis: Anemia  ? Plan: Hemoglobin 8 1--7 5--9 2--8  5  Continue monitoring  No acute bleeding noticed  ? Patient has required units of PRBCs after AKA with formalization  Will continue to monitor         Endo:   · Diagnosis: T2DM  ?  Plan: Patient initially presented with DKA which has since resolved  ? Insulin gtt  Goal GB: 140-180  ? Continue monitoring  Patient has maintained good glucose control overnight  ? Adrenal insufficiency: will down titrate solucortef after discussing with Endocrine         ID:   · Diagnosis: COVID-19 Infection  ? Plan: Patient is on the severe COVID pathway  ? Completed 10 days of Decadron 0 1 mg/kg  ? Completed course of decadron taper  ? Continue vitamin cocktail and statin  ? Remdesavir, convalescent plasma, and Actemra not given  ? Inflammatory Markers:  § 1/22 Procalcitonin 0 75 (down from 0 82)  § 1/14  (down from 279 4)  § 1/14 Ferritin 1860 (up from 1608)  § 1/19 D-Dimer 2 54 (down from 12 77)  · Diagnosis: Leukocytosis  ? Plan: WBC count down tredning  Monitor cbc  ? Trend Fever curve  Consider non infectious etiology for fevers  ? cont Day 4 vancomycin/cefepime persistent fevers/ leukocytosis  ? Negative blood cultures X 48hrs  (1/21)  F/u MRSA and sputum contaminated  ? RIJ CVC removed and replaced 1/22  ? Continue monitoring Veras for potential for infection         MSK/Skin:   · Diagnosis: Acute Limb Ischemia with Rhabdomyolysis  ? Plan:  Duplex of the lower extremities on 1/12 revealed a right femoral deep venous thrombosis  ? Post intubation, the right lower extremity was found to be pale and pulseless  The patient was transferred to [facility] for vascular surgery  ? CT scan revealed filling defect in descending aorta consistent with a thrombus  ? Patient is postop day 10 [facility] a RLE AKA; [facility]   He is postop day for from formalization of the above the knee amputation  ? Continue to turn and reposition frequently; wound care appreciated  ? Offload pressure points  ? Physical therapy and Occupational therapy consulted  ?  On therapeutic Lovenox        Disposition: Continue Critical Care   Code Status: Level 1 - Full Code    ---------------------------------------------------------------------------------------  ICU CORE MEASURES    Prophylaxis   VTE Pharmacologic Prophylaxis: Enoxaparin (Lovenox)  VTE Mechanical Prophylaxis: sequential compression device  Stress Ulcer Prophylaxis:PO omeprazole    ABCDE Protocol (if indicated)  Plan to perform spontaneous awakening trial today? n/a  Plan to perform spontaneous breathing trial today? N/A  Obvious barriers to extubation? Not applicable  CAM-ICU: Negative    Invasive Devices Review  Invasive Devices     Peripheral Intravenous Line            Peripheral IV 21 Right Antecubital 1 day    Peripheral IV 21 Right;Ventral (anterior) Forearm 1 day          Drain            NG/OG/Enteral Tube Nasogastric 18 Fr Left nares 1 day    External Urinary Catheter Medium less than 1 day              Can any invasive devices be discontinued today? No  ---------------------------------------------------------------------------------------  OBJECTIVE    Vitals   Vitals:    21 2300 21 2334 21 0000 21 0100   BP: 151/64  155/70 153/74   BP Location:   Left arm    Pulse: (!) 106  96 96   Resp: (!) 56  (!) 26 (!) 24   Temp:  97 9 °F (36 6 °C)     TempSrc:  Oral     SpO2: 93%  96% 98%   Weight:       Height:         Temp (24hrs), Av 4 °F (36 9 °C), Min:97 8 °F (36 6 °C), Max:99 °F (37 2 °C)  Current: Temperature: 97 9 °F (36 6 °C)  HR: 96  BP: 153/74  RR: 24  SpO2: 98%    Respiratory:  SpO2: SpO2: 98 %  Nasal Cannula O2 Flow Rate (L/min): 7 L/min    Invasive/non-invasive ventilation settings   Respiratory    Lab Data (Last 4 hours)    None         O2/Vent Data (Last 4 hours)    None                Physical Exam  Vitals signs and nursing note reviewed  Constitutional:       Appearance: Normal appearance  He is ill-appearing  HENT:      Head: Normocephalic and atraumatic  Nose: Nose normal       Comments: ngt     Mouth/Throat:      Comments: Mild erosions in corners of mouth  Eyes:      Extraocular Movements: Extraocular movements intact        Conjunctiva/sclera: Conjunctivae normal       Pupils: Pupils are equal, round, and reactive to light  Neck:      Comments: No tenderness to palpation of neck  Cardiovascular:      Rate and Rhythm: Normal rate and regular rhythm  Pulses: Normal pulses  Heart sounds: Normal heart sounds  Pulmonary:      Effort: Pulmonary effort is normal  No respiratory distress  Breath sounds: Normal breath sounds  No wheezing  Comments: 8L midflow  Abdominal:      General: Abdomen is flat  Bowel sounds are normal  There is no distension  Palpations: Abdomen is soft  Tenderness: There is no abdominal tenderness  Musculoskeletal: Normal range of motion  General: Swelling (all extremities ) and deformity (R LE aka) present  No tenderness  Skin:     General: Skin is warm and dry  Capillary Refill: Capillary refill takes less than 2 seconds  Neurological:      Mental Status: He is alert and oriented to person, place, and time           Laboratory and Diagnostics:  Results from last 7 days   Lab Units 01/28/21  0600 01/27/21  0501 01/26/21  0529 01/25/21  0436 01/24/21  0447 01/23/21  0434 01/22/21  0435   WBC Thousand/uL 8 83 10 37* 15 38* 8 37 7 27 10 63* 13 56*   HEMOGLOBIN g/dL 8 4* 8 5* 9 2* 7 5* 7 5* 8 1* 8 9*   HEMATOCRIT % 28 8* 28 1* 29 4* 23 7* 24 1* 25 5* 27 2*   PLATELETS Thousands/uL 172 193 209 147* 149 177 193   NEUTROS PCT % 85*  --   --   --   --  89*  --    MONOS PCT % 4  --   --   --   --  4  --      Results from last 7 days   Lab Units 01/27/21  0512 01/26/21 2004 01/26/21  1422 01/26/21 0529 01/25/21 0436 01/24/21 0447 01/23/21  0434   SODIUM mmol/L 146* 143 143 139 137 139 138   POTASSIUM mmol/L 4 1 4 3 3 0* 2 7* 3 5 3 7 3 9   CHLORIDE mmol/L 117* 113* 112* 109* 106 107 107   CO2 mmol/L 25 27 27 24 26 25 26   ANION GAP mmol/L 4 3* 4 6 5 7 5   BUN mg/dL 30* 28* 26* 26* 33* 32* 27*   CREATININE mg/dL 0 57* 0 56* 0 52* 0 60 0 60 0 68 0 55*   CALCIUM mg/dL 7 8* 7 5* 7 8* 7 7* 7 5* 7 9* 7 6*   GLUCOSE RANDOM mg/dL 133 163* 116 167* 139 147* 154*   ALT U/L  --   --   --   --   --   --  52   AST U/L  --   --   --   --   --   --  43   ALK PHOS U/L  --   --   --   --   --   --  148*   ALBUMIN g/dL  --   --   --   --   --   --  1 3*   TOTAL BILIRUBIN mg/dL  --   --   --   --   --   --  0 59     Results from last 7 days   Lab Units 01/27/21  0512 01/26/21  0529 01/25/21  0436 01/23/21  0434 01/22/21  0516   MAGNESIUM mg/dL 2 4 2 0 2 0 2 0  --    PHOSPHORUS mg/dL 2 5 2 2* 2 7 3 3 3 4                   ABG:  Results from last 7 days   Lab Units 01/25/21  0436   PH ART  7 438   PCO2 ART mm Hg 31 6*   PO2 ART mm Hg 90 4   HCO3 ART mmol/L 20 9*   BASE EXC ART mmol/L -2 8   ABG SOURCE  Line, Arterial     VBG:  Results from last 7 days   Lab Units 01/25/21  0436   ABG SOURCE  Line, Arterial     Results from last 7 days   Lab Units 01/25/21  0436 01/24/21  0447 01/22/21  0606 01/21/21  1235   PROCALCITONIN ng/ml 0 57* 0 85* 0 75* 0 82*       Micro  Results from last 7 days   Lab Units 01/23/21  1735 01/23/21  0721 01/21/21  1401 01/21/21  1320   BLOOD CULTURE   --   --  No Growth After 5 Days  No Growth After 5 Days     SPUTUM CULTURE   --  2+ Growth of   --   --    GRAM STAIN RESULT   --  1+ Epithelial cells per low power field*  1+ Polys*  Rare Gram positive cocci in pairs*  Rare Yeast*  --   --    MRSA CULTURE ONLY  No Methicillin Resistant Staphlyococcus aureus (MRSA) isolated  --   --   --        EKG: qtc  Imaging: na    Intake and Output  I/O       01/26 0701 - 01/27 0700 01/27 0701 - 01/28 0700    I V  (mL/kg) 86 7 (1 1) 84 9 (1 1)    NG/ 420    IV Piggyback 600     Feedings 809 1016    Total Intake(mL/kg) 1855 7 (23 5) 1520 9 (19 3)    Urine (mL/kg/hr) 845 (0 4) 885 (0 5)    Stool 0 0    Total Output 845 885    Net +1010 7 +635 9          Unmeasured Urine Occurrence  1 x    Unmeasured Stool Occurrence 3 x 4 x        UOP: 885ml over 24 hours    Height and Weights   Height: 5' 6" (167 6 cm)  IBW: 63 8 kg  Body mass index is 28 11 kg/m²  Weight (last 2 days)     Date/Time   Weight    01/27/21 0532   79 (174 16)    01/26/21 0600   78 6 (173 28)                Nutrition       Diet Orders   (From admission, onward)             Start     Ordered    01/27/21 1153  Diet Enteral/Parenteral; Tube Feeding No Oral Diet; Glucerna 1 2; Continuous; 68; 150; Water; Every 4 hours  Diet effective now     Question Answer Comment   Diet Type Enteral/Parenteral    Enteral/Parenteral Tube Feeding No Oral Diet    Tube Feeding Formula: Glucerna 1 2    Bolus/Cyclic/Continuous Continuous    Tube Feeding Goal Rate (mL/hr): 68    Tube Feeding flush (mL): 150    Water Flush type: Water    Water flush frequency: Every 4 hours    RD to adjust diet per protocol? Yes        01/27/21 1152              TF currently running at 68 ml/hr with a goal of 68 ml/hr   Formula: glucerna 1 2      Active Medications  Scheduled Meds:  Current Facility-Administered Medications   Medication Dose Route Frequency Provider Last Rate    acetaminophen  650 mg Oral Q6H PRN Renaldo Carlson MD      bisacodyl  10 mg Rectal Daily PRN Chandan Bermudez MD      cholecalciferol  2,000 Units Oral Daily Chandan Bermudez MD      Diclofenac Sodium  4 g Topical 4x Daily Renaldo Carlson MD      enoxaparin  1 mg/kg Subcutaneous Q12H [facility] VERONICA Will      gabapentin  400 mg Oral TID VERONICA Holman      glycopyrrolate  1 mg Oral TID VERONICA Will      hydrocortisone sodium succinate  25 mg Intravenous Q6H Anh Dyson MD      HYDROmorphone  0 5 mg Intravenous Q3H PRN Renaldo Carlson MD      HYDROmorphone  0 5 mg Intravenous Q3H PRN Renaldo Carlson MD      insulin glargine  20 Units Subcutaneous HS Maddie Bundy PA-C      insulin lispro  1-6 Units Subcutaneous Q6H [facility] Radha Chan PA-C      levalbuterol  1 25 mg Nebulization TID Pedro Louis DO      Lidocaine Viscous HCl  15 mL Swish & Spit 4x Daily PRN Renaldo Carlson MD     [facility] menthol-methyl salicylate   Apply externally 4x Daily PRN Blanche Emerson MD      methadone  23 mg Per PEG Tube [facility] Saundra Tarango MD      multivitamin-minerals  1 tablet Oral Daily Elida Souza MD      omeprazole (PRILOSEC) suspension 2 mg/mL  20 mg Oral Early Morning Diogenes Shore PA-C      phenol  1 spray Mouth/Throat Q2H PRN VERONICA Elliott      polyethylene glycol  17 g Oral Daily Elida Souza MD      senna  1 tablet Oral BID Blanche Emerson MD      sodium chloride  3 mL Nebulization TID Paty Garcia DO       Continuous Infusions:     PRN Meds:   acetaminophen, 650 mg, Q6H PRN  bisacodyl, 10 mg, Daily PRN  HYDROmorphone, 0 5 mg, Q3H PRN  HYDROmorphone, 0 5 mg, Q3H PRN  Lidocaine Viscous HCl, 15 mL, 4x Daily PRN  menthol-methyl salicylate, , 4x Daily PRN  phenol, 1 spray, Q2H PRN        Allergies   Allergies   Allergen Reactions    Varenicline      ---------------------------------------------------------------------------------------  Advance Directive and Living Will:      Power of :    POLST:    ---------------------------------------------------------------------------------------  Care Time Delivered:   45minutes    Blanche Emerson MD      Portions of the record may have been created with voice recognition software  Occasional wrong word or "sound a like" substitutions may have occurred due to the inherent limitations of voice recognition software    Read the chart carefully and recognize, using context, where substitutions have occurred

## 2021-01-28 NOTE — PLAN OF CARE
Problem: Prexisting or High Potential for Compromised Skin Integrity  Goal: Skin integrity is maintained or improved  Description: INTERVENTIONS:  - Identify patients at risk for skin breakdown  - Assess and monitor skin integrity  - Assess and monitor nutrition and hydration status  - Monitor labs   - Assess for incontinence   - Turn and reposition patient  - Assist with mobility/ambulation  - Relieve pressure over bony prominences  - Avoid friction and shearing  - Provide appropriate hygiene as needed including keeping skin clean and dry  - Evaluate need for skin moisturizer/barrier cream  - Collaborate with interdisciplinary team   - Patient/family teaching  - Consider wound care consult   Outcome: Progressing     Problem: Potential for Falls  Goal: Patient will remain free of falls  Description: INTERVENTIONS:  - Assess patient frequently for physical needs  -  Identify cognitive and physical deficits and behaviors that affect risk of falls    -  [place] fall precautions as indicated by assessment   - Educate patient/family on patient safety including physical limitations  - Instruct patient to call for assistance with activity based on assessment  - Modify environment to reduce risk of injury  - Consider OT/PT consult to assist with strengthening/mobility  Outcome: Progressing

## 2021-01-28 NOTE — CASE MANAGEMENT
CM continues to follow pt  Remains in MICU s/p right AKA and with Covid PNU  He is oriented x2, very weak  Unable to maintain head support, drooling, remains NPO  On Midflow [email or web address]  Will need inpatient rehab when ready  Receiving tube feedings  Completed IV antibiotics  Will make referrals for rehabs when stable

## 2021-01-28 NOTE — SPEECH THERAPY NOTE
Speech Language/Pathology  Attempted to see pt for ongoing dysphagia tx  Pt remains inappropriate for intervention- unable to maintain head support, ongoing drooling w/ poor secretion management, requiring midflow O2  Will follow for any potential as able  Continue ng for now, oral care often-high risk for aspiration of secretions

## 2021-01-28 NOTE — RESTORATIVE TECHNICIAN NOTE
Restorative Specialist Mobility Note                 Completed ROM with pt and repositioned in bed    Cole Chayito present

## 2021-01-28 NOTE — UTILIZATION REVIEW
Continued Stay Review    Date: 1-28 -21              Current Patient Class: inpatient  Current Level of Care: critical care    HPI:60 y o  male initially admitted on 1-13-21 for pneumonia due to covid 23, s/p right AKA     Assessment/Plan:     Continue oxygen 8L mid floe nasal canula  sats  87%  Continue iv  hydrocortisone  Patient remains weak, drooling, unable to maintain head support  Continue tube feeds  Pertinent Labs/Diagnostic Results:       Results from last 7 days   Lab Units 01/28/21  0600 01/27/21  0501 01/26/21  0529 01/25/21  0436 01/24/21  0447 01/23/21  0434   WBC Thousand/uL 8 83 10 37* 15 38* 8 37 7 27 10 63*   HEMOGLOBIN g/dL 8 4* 8 5* 9 2* 7 5* 7 5* 8 1*   HEMATOCRIT % 28 8* 28 1* 29 4* 23 7* 24 1* 25 5*   PLATELETS Thousands/uL 172 193 209 147* 149 177   NEUTROS ABS Thousands/µL 7 51  --   --   --   --  9 39*         Results from last 7 days   Lab Units 01/28/21  0600 01/27/21  0512 01/26/21  2004 01/26/21  1422 01/26/21  0529 01/25/21  0436 01/24/21  0447 01/23/21  0434 01/22/21  0516  01/22/21  0439   SODIUM mmol/L 146* 146* 143 143 139 137 139 138 137   < >  --    POTASSIUM mmol/L 3 6 4 1 4 3 3 0* 2 7* 3 5 3 7 3 9 4 1   < >  --    CHLORIDE mmol/L 115* 117* 113* 112* 109* 106 107 107 104   < >  --    CO2 mmol/L 27 25 27 27 24 26 25 26 27   < >  --    ANION GAP mmol/L 4 4 3* 4 6 5 7 5 6   < >  --    BUN mg/dL 27* 30* 28* 26* 26* 33* 32* 27* 25   < >  --    CREATININE mg/dL 0 60 0 57* 0 56* 0 52* 0 60 0 60 0 68 0 55* 0 60   < >  --    EGFR ml/min/1 73sq m 108 110 111 114 108 108 102 112 108   < >  --    CALCIUM mg/dL 7 8* 7 8* 7 5* 7 8* 7 7* 7 5* 7 9* 7 6* 7 9*   < >  --    CALCIUM, IONIZED mmol/L  --   --   --   --   --   --  1 05* 1 06*  --   --  1 03*   MAGNESIUM mg/dL  --  2 4  --   --  2 0 2 0  --  2 0  --   --   --    PHOSPHORUS mg/dL  --  2 5  --   --  2 2* 2 7  --  3 3 3 4  --   --     < > = values in this interval not displayed       Results from last 7 days   Lab Units 01/23/21  0434   AST U/L 43   ALT U/L 52   ALK PHOS U/L 148*   TOTAL PROTEIN g/dL 6 6   ALBUMIN g/dL 1 3*   TOTAL BILIRUBIN mg/dL 0 59   BILIRUBIN DIRECT mg/dL 0 26*     Results from last 7 days   Lab Units 01/28/21  1214 01/28/21  0539 01/27/21  2309 01/27/21  1730 01/27/21  1346 01/27/21  1235 01/27/21  1016 01/27/21  0747 01/27/21  0608 01/27/21  0417 01/27/21  0212 01/27/21  0013   POC GLUCOSE mg/dl 297* 299* 310* 402* 156* 147* 134 210* 169* 135 149* 164*     Results from last 7 days   Lab Units 01/28/21  0600 01/27/21  0512 01/26/21  2004 01/26/21  1422 01/26/21  0529 01/25/21  0436 01/24/21  0447 01/23/21  0434 01/22/21  0516   GLUCOSE RANDOM mg/dL 282* 133 163* 116 167* 139 147* 154* 154*             BETA-HYDROXYBUTYRATE   Date Value Ref Range Status   01/11/2021 6 2 (H) <0 6 mmol/L Final      Results from last 7 days   Lab Units 01/25/21  0436   PH ART  7 438   PCO2 ART mm Hg 31 6*   PO2 ART mm Hg 90 4   HCO3 ART mmol/L 20 9*   BASE EXC ART mmol/L -2 8   O2 CONTENT ART mL/dL 11 3*   O2 HGB, ARTERIAL % 95 5   ABG SOURCE  Line, Arterial       Results from last 7 days   Lab Units 01/25/21  0436   D-DIMER QUANTITATIVE ug/ml FEU 1 36*       Results from last 7 days   Lab Units 01/25/21  0436 01/24/21  0447 01/22/21  0606   PROCALCITONIN ng/ml 0 57* 0 85* 0 75*         Results from last 7 days   Lab Units 01/25/21  0436   FERRITIN ng/mL 995*         Results from last 7 days   Lab Units 01/23/21  0434   LIPASE u/L 99     Results from last 7 days   Lab Units 01/25/21  0436   CRP mg/L 57 9*       Results from last 7 days   Lab Units 01/23/21  0721   SPUTUM CULTURE  2+ Growth of    GRAM STAIN RESULT  1+ Epithelial cells per low power field*  1+ Polys*  Rare Gram positive cocci in pairs*  Rare Yeast*               Vital Signs:       Date/Time  Temp  Pulse  Resp  BP  MAP (mmHg)   SpO2   Nasal Cannula O2 Flow Rate (L/min)  O2 Device    01/28/21 1400    78  120Abnormal   112/64  101   87 %Abnormal          01/28/21 [address]   91 %   8 L/min  Mid flow nasal cannula    01/28/21 1330    74  21  142/73  92   92 %         01/28/21 1301             89 %Abnormal          01/28/21 1300    78  21  146/72  92   89 %Abnormal          01/28/21 1234             87 %Abnormal          01/28/21 1230    78  23Abnormal   148/80  97   92 %         01/28/21 1215    72  19       94 %         01/28/21 1200    76  21  144/71  96   92 %         01/28/21 1145    80  21       88 %Abnormal          01/28/21 1130    84  24Abnormal   140/69  95   93 %         01/28/21 1115    76  20       93 %         01/28/21 1100    84  25Abnormal   136/70  89   99 %         01/28/21 1030    76  20  141/70  91   100 %         01/28/21 1000    74  21  139/66  91   91 %         01/28/21 0930    80  21  140/64  88   94 %         01/28/21 0900    72  19  136/63  87   95 %         01/28/21 0845    86  29Abnormal   151/72  107   80 %Abnormal          01/28/21 0830    84  26Abnormal        97 %         01/28/21 0749             96 %   8 L/min  Mid flow nasal cannula    01/28/21 0745    82  158Abnormal        98 %         01/28/21 0600    80  25Abnormal        95 %         01/28/21 0500    94  27Abnormal   134/56     91 %         01/28/21 0400  98 °F (36 7 °C)  98  29Abnormal   155/69  109   81 %Abnormal      Mid flow nasal cannula    01/28/21 0300    96  25Abnormal   151/69  106   96 %         01/28/21 0200    94  30Abnormal   153/78  126   98 %         01/28/21 0100    96  24Abnormal   153/74  116   98 %         01/28/21 0000    96  26Abnormal   155/70  107   96 %     Mid flow nasal cannula              Medications:     cholecalciferol, 2,000 Units, Oral, Daily  Diclofenac Sodium, 4 g, Topical, 4x Daily  enoxaparin, 1 mg/kg, Subcutaneous, Q12H LIZETT  gabapentin, 400 mg, Oral, TID  glycopyrrolate, 1 mg, Oral, TID  hydrocortisone sodium succinate, 25 mg, Intravenous, Q6H [address]  insulin glargine, 30 Units, Subcutaneous, HS  insulin lispro, 1-6 Units, Subcutaneous, Q6H [address]  levalbuterol, 1 25 mg, Nebulization, TID  lisinopril, 10 mg, Oral, Daily  methadone, 23 mg, Per PEG Tube, Q8H [address]  multivitamin-minerals, 1 tablet, Oral, Daily  omeprazole (PRILOSEC) suspension 2 mg/mL, 20 mg, Oral, Early Morning  polyethylene glycol, 17 g, Oral, Daily  senna, 1 tablet, Oral, BID  sodium chloride, 3 mL, Nebulization, TID      Continuous IV Infusions:     PRN Meds:  acetaminophen, 650 mg, Oral, Q6H PRN  bisacodyl, 10 mg, Rectal, Daily PRN  HYDROmorphone, 0 5 mg, Intravenous, Q3H PRN  HYDROmorphone, 0 5 mg, Intravenous, Q3H PRN  Lidocaine Viscous HCl, 15 mL, Swish & Spit, 4x Daily PRN  menthol-methyl salicylate, , Apply externally, 4x Daily PRN  phenol, 1 spray, Mouth/Throat, Q2H PRN        Discharge Plan: to be determined     Network Utilization Review Department  ATTENTION: Please call with any questions or concerns to [phone] and carefully listen to the prompts so that you are directed to the right person  All voicemails are confidential   Xavier Los all requests for admission clinical reviews, approved or denied determinations and any other requests to dedicated fax number below belonging to the campus where the patient is receiving treatment   List of dedicated fax numbers for the Facilities:  [address] DENIALS (Administrative/Medical Necessity) [phone]   [address] (Maternity/NICU/Pediatrics) [phone]   [address] [place] [address] Dr Malia Robles 2504 (  Kana Epps "Cara" 103) [address] [place] [address] [phone]     Mitchell Johnson 37 [phone]   [place] [id number] [phone]

## 2021-01-29 LAB
ANION GAP SERPL CALCULATED.3IONS-SCNC: 1 MMOL/L (ref 4–13)
BUN SERPL-MCNC: 28 MG/DL (ref 5–25)
CALCIUM SERPL-MCNC: 8.4 MG/DL (ref 8.3–10.1)
CHLORIDE SERPL-SCNC: 114 MMOL/L (ref 100–108)
CO2 SERPL-SCNC: 31 MMOL/L (ref 21–32)
CREAT SERPL-MCNC: 0.52 MG/DL (ref 0.6–1.3)
ERYTHROCYTE [DISTWIDTH] IN BLOOD BY AUTOMATED COUNT: 17.4 % (ref 11.6–15.1)
ERYTHROCYTE [DISTWIDTH] IN BLOOD BY AUTOMATED COUNT: 17.5 % (ref 11.6–15.1)
GFR SERPL CREATININE-BSD FRML MDRD: 114 ML/MIN/1.73SQ M
GLUCOSE SERPL-MCNC: 122 MG/DL (ref 65–140)
GLUCOSE SERPL-MCNC: 163 MG/DL (ref 65–140)
GLUCOSE SERPL-MCNC: 208 MG/DL (ref 65–140)
GLUCOSE SERPL-MCNC: 227 MG/DL (ref 65–140)
GLUCOSE SERPL-MCNC: 244 MG/DL (ref 65–140)
GLUCOSE SERPL-MCNC: 249 MG/DL (ref 65–140)
GLUCOSE SERPL-MCNC: 316 MG/DL (ref 65–140)
HCT VFR BLD AUTO: 17.8 % (ref 36.5–49.3)
HCT VFR BLD AUTO: 30.5 % (ref 36.5–49.3)
HGB BLD-MCNC: 5.2 G/DL (ref 12–17)
HGB BLD-MCNC: 9.2 G/DL (ref 12–17)
MAGNESIUM SERPL-MCNC: 2.2 MG/DL (ref 1.6–2.6)
MCH RBC QN AUTO: 29.7 PG (ref 26.8–34.3)
MCH RBC QN AUTO: 29.8 PG (ref 26.8–34.3)
MCHC RBC AUTO-ENTMCNC: 29.2 G/DL (ref 31.4–37.4)
MCHC RBC AUTO-ENTMCNC: 30.2 G/DL (ref 31.4–37.4)
MCV RBC AUTO: 102 FL (ref 82–98)
MCV RBC AUTO: 99 FL (ref 82–98)
PLATELET # BLD AUTO: 165 THOUSANDS/UL (ref 149–390)
PLATELET # BLD AUTO: 77 THOUSANDS/UL (ref 149–390)
PMV BLD AUTO: 11.1 FL (ref 8.9–12.7)
PMV BLD AUTO: 11.6 FL (ref 8.9–12.7)
POTASSIUM SERPL-SCNC: 3.5 MMOL/L (ref 3.5–5.3)
RBC # BLD AUTO: 1.75 MILLION/UL (ref 3.88–5.62)
RBC # BLD AUTO: 3.09 MILLION/UL (ref 3.88–5.62)
SODIUM SERPL-SCNC: 146 MMOL/L (ref 136–145)
WBC # BLD AUTO: 5.2 THOUSAND/UL (ref 4.31–10.16)
WBC # BLD AUTO: 8.66 THOUSAND/UL (ref 4.31–10.16)

## 2021-01-29 PROCEDURE — 85027 COMPLETE CBC AUTOMATED: CPT | Performed by: NURSE PRACTITIONER

## 2021-01-29 PROCEDURE — 80048 BASIC METABOLIC PNL TOTAL CA: CPT | Performed by: NURSE PRACTITIONER

## 2021-01-29 PROCEDURE — 83735 ASSAY OF MAGNESIUM: CPT | Performed by: NURSE PRACTITIONER

## 2021-01-29 PROCEDURE — 94640 AIRWAY INHALATION TREATMENT: CPT

## 2021-01-29 PROCEDURE — 97530 THERAPEUTIC ACTIVITIES: CPT

## 2021-01-29 PROCEDURE — 99233 SBSQ HOSP IP/OBS HIGH 50: CPT | Performed by: INTERNAL MEDICINE

## 2021-01-29 PROCEDURE — 94760 N-INVAS EAR/PLS OXIMETRY 1: CPT

## 2021-01-29 PROCEDURE — 92526 ORAL FUNCTION THERAPY: CPT

## 2021-01-29 PROCEDURE — 85027 COMPLETE CBC AUTOMATED: CPT | Performed by: PHYSICIAN ASSISTANT

## 2021-01-29 PROCEDURE — 82948 REAGENT STRIP/BLOOD GLUCOSE: CPT

## 2021-01-29 RX ORDER — POTASSIUM CHLORIDE 20 MEQ/1
40 TABLET, EXTENDED RELEASE ORAL ONCE
Status: COMPLETED | OUTPATIENT
Start: 2021-01-29 | End: 2021-01-29

## 2021-01-29 RX ORDER — INSULIN GLARGINE 100 [IU]/ML
40 INJECTION, SOLUTION SUBCUTANEOUS EVERY 12 HOURS SCHEDULED
Status: DISCONTINUED | OUTPATIENT
Start: 2021-01-29 | End: 2021-01-31

## 2021-01-29 RX ORDER — OLANZAPINE 5 MG/1
5 TABLET, ORALLY DISINTEGRATING ORAL ONCE
Status: DISCONTINUED | OUTPATIENT
Start: 2021-01-29 | End: 2021-01-29

## 2021-01-29 RX ADMIN — HYDROCORTISONE SODIUM SUCCINATE 25 MG: 100 INJECTION, POWDER, FOR SOLUTION INTRAMUSCULAR; INTRAVENOUS at 00:47

## 2021-01-29 RX ADMIN — METHADONE HYDROCHLORIDE 23 MG: 10 CONCENTRATE ORAL at 21:25

## 2021-01-29 RX ADMIN — ENOXAPARIN SODIUM 80 MG: 80 INJECTION SUBCUTANEOUS at 17:50

## 2021-01-29 RX ADMIN — GLYCOPYRROLATE 1 MG: 1 TABLET ORAL at 21:21

## 2021-01-29 RX ADMIN — GABAPENTIN 400 MG: 400 CAPSULE ORAL at 17:51

## 2021-01-29 RX ADMIN — METHADONE HYDROCHLORIDE 23 MG: 10 CONCENTRATE ORAL at 17:51

## 2021-01-29 RX ADMIN — LEVALBUTEROL HYDROCHLORIDE 1.25 MG: 1.25 SOLUTION, CONCENTRATE RESPIRATORY (INHALATION) at 08:18

## 2021-01-29 RX ADMIN — DICLOFENAC SODIUM 4 G: 10 GEL TOPICAL at 14:12

## 2021-01-29 RX ADMIN — INSULIN GLARGINE 40 UNITS: 100 INJECTION, SOLUTION SUBCUTANEOUS at 21:22

## 2021-01-29 RX ADMIN — SENNOSIDES 8.6 MG: 8.6 TABLET, FILM COATED ORAL at 17:51

## 2021-01-29 RX ADMIN — ISODIUM CHLORIDE 3 ML: 0.03 SOLUTION RESPIRATORY (INHALATION) at 20:20

## 2021-01-29 RX ADMIN — INSULIN LISPRO 4 UNITS: 100 INJECTION, SOLUTION INTRAVENOUS; SUBCUTANEOUS at 18:05

## 2021-01-29 RX ADMIN — INSULIN LISPRO 8 UNITS: 100 INJECTION, SOLUTION INTRAVENOUS; SUBCUTANEOUS at 10:13

## 2021-01-29 RX ADMIN — ISODIUM CHLORIDE 3 ML: 0.03 SOLUTION RESPIRATORY (INHALATION) at 08:18

## 2021-01-29 RX ADMIN — LISINOPRIL 10 MG: 10 TABLET ORAL at 10:11

## 2021-01-29 RX ADMIN — GABAPENTIN 400 MG: 400 CAPSULE ORAL at 10:11

## 2021-01-29 RX ADMIN — GABAPENTIN 400 MG: 400 CAPSULE ORAL at 21:20

## 2021-01-29 RX ADMIN — INSULIN GLARGINE 30 UNITS: 100 INJECTION, SOLUTION SUBCUTANEOUS at 10:11

## 2021-01-29 RX ADMIN — GLYCOPYRROLATE 1 MG: 1 TABLET ORAL at 17:51

## 2021-01-29 RX ADMIN — INSULIN LISPRO 8 UNITS: 100 INJECTION, SOLUTION INTRAVENOUS; SUBCUTANEOUS at 14:12

## 2021-01-29 RX ADMIN — DICLOFENAC SODIUM 4 G: 10 GEL TOPICAL at 18:09

## 2021-01-29 RX ADMIN — HYDROCORTISONE SODIUM SUCCINATE 25 MG: 100 INJECTION, POWDER, FOR SOLUTION INTRAMUSCULAR; INTRAVENOUS at 10:12

## 2021-01-29 RX ADMIN — METHADONE HYDROCHLORIDE 23 MG: 10 CONCENTRATE ORAL at 10:14

## 2021-01-29 RX ADMIN — ENOXAPARIN SODIUM 80 MG: 80 INJECTION SUBCUTANEOUS at 00:59

## 2021-01-29 RX ADMIN — DICLOFENAC SODIUM 4 G: 10 GEL TOPICAL at 22:30

## 2021-01-29 RX ADMIN — LEVALBUTEROL HYDROCHLORIDE 1.25 MG: 1.25 SOLUTION, CONCENTRATE RESPIRATORY (INHALATION) at 13:13

## 2021-01-29 RX ADMIN — ISODIUM CHLORIDE 3 ML: 0.03 SOLUTION RESPIRATORY (INHALATION) at 13:13

## 2021-01-29 RX ADMIN — INSULIN LISPRO 5 UNITS: 100 INJECTION, SOLUTION INTRAVENOUS; SUBCUTANEOUS at 00:12

## 2021-01-29 RX ADMIN — LEVALBUTEROL HYDROCHLORIDE 1.25 MG: 1.25 SOLUTION, CONCENTRATE RESPIRATORY (INHALATION) at 20:20

## 2021-01-29 RX ADMIN — POLYETHYLENE GLYCOL 3350 17 G: 17 POWDER, FOR SOLUTION ORAL at 10:16

## 2021-01-29 RX ADMIN — Medication 1 TABLET: at 10:10

## 2021-01-29 RX ADMIN — HYDROCORTISONE SODIUM SUCCINATE 25 MG: 100 INJECTION, POWDER, FOR SOLUTION INTRAMUSCULAR; INTRAVENOUS at 21:23

## 2021-01-29 RX ADMIN — POTASSIUM CHLORIDE 40 MEQ: 1500 TABLET, EXTENDED RELEASE ORAL at 14:13

## 2021-01-29 RX ADMIN — Medication 2000 UNITS: at 10:10

## 2021-01-29 RX ADMIN — DICLOFENAC SODIUM 4 G: 10 GEL TOPICAL at 09:00

## 2021-01-29 NOTE — SPEECH THERAPY NOTE
Speech Language/Pathology    Speech/Language Pathology Progress Note    Patient Name: Sherrill Torres  WZGHW'I Date: 1/29/2021     Subjective:  Pt awake, alert, able to hold up his head  Current Diet: npo, ng in place w/ tube feeds  Objective:  Pt able to keep his head in midline today  He is more awake, voicing is harsh/breathy & dysphonic  Sats began at 94 %  Pt trialed w/ puree, ht by tsp/cup  Pt w/ scabs on L side lips, reduced seal on the tsp-but today able to retrieve  Labored transfers w/  suspected mild delay  His laryngeal rise was palpated & judged to be mod reduced w/ the need for mult swallows that were fairly incomplete  The pt continued to smile & state how "great" it was however his O2 began to fall w/ only half the material to 87%, then to 84% & was unable to recover back into the 90s  Trials were ceased  Assessment:  Though pt is more awake/alert he continues to present w/ mod/severe oral, severe pharyngeal dysphagia w/ very weak swallow function & drop in O2       Plan/Recommendations:  Continue NPO for now w/ oral care  SLP will continue to follow for upgrades as able

## 2021-01-29 NOTE — NUTRITION
01/29/21 1107   Recommendations/Interventions   Summary Patient remains NPO secondary to high aspiration risk  EN continues to provide 100% nutritional needs via NGT  Patient is s/p right AKA 1/18, Deep tissue injuries to scrotum and sacrum noted  Generalized +2 edema  Malnutrition/BMI Present   (Deferred at this time, unable to complete physical assessment )   Interventions EN change formula/rate   Nutrition Recommendations Tube feeding recs provided;Lab consider order (Specify)  (Suggest increase EN to: Glucerna 1 2 kcal @ 75 ml/hr with 140 ml free water flush every 6 hours (2160 kcal, 108 grams protein, 2000 ml tv)   Monitor electrolytes and weight )

## 2021-01-29 NOTE — PROGRESS NOTES
Daily Progress Note - Critical Care   Travon Antonio 56 y o  male MRN: [id number]  Unit/Bed#: -01 Encounter: [id number]        ----------------------------------------------------------------------------------------  HPI/24hr events: Patient was transferred laterally to [facility] 87 [phone] from the MICU and level of care decreased to Step down 1  Patient had increased glucose overnight > 300 on multiple reads  ---------------------------------------------------------------------------------------  SUBJECTIVE  Patient has no complaints this morning    Review of Systems   Unable to perform ROS: Mental status change   as well as voice change    Review of systems was reviewed and negative unless stated above in HPI/24-hour events   ---------------------------------------------------------------------------------------  Assessment and Plan:    Neuro:    Diagnosis: chronic pain - appreciate APS recs  o Plan: patient on methadone 23mg q8  o neurontin 400 TID   Diagnosis: delirium  o Plan: delirium precautions  o seroquel PRN    CV:    Diagnosis: HTN  o Plan: lisinopril 10   Diagnosis: DVT prophy/ aortic thrombus  o Plan: therapeutic 1/kg lovenox  Vasc surgery following given AKA with formalization        Pulm:   Diagnosis: hypox resp failure 2/2 covid 19 pneumonia  o Plan: decreasing requirements, extubated 1/25  o Currently on NC, wean as tolerated   1/12 with small PE - currently on lovenox   Diagnosis: pain in throat, voice change, difficulty swallowing  Possible vocal cord dysfunction after intubation  o Plan: consider ENT consult when patient has remained stable     o Chloraseptic, NPO, speech therapy  o glycopyrolate      GI:    Diagnosis: dysphagia 2/2 aspirations  o Plan: PTOT  o TF   Diagnosis: bowel regimen  o Plan: miralax scheduled  o senna   GI prophylaxis / GERD / UGI bleed prior  o omeprazole      :    Diagnosis: hematuria - urethral stricture, difficult rogers  o Plan  o Urology following  o Voiding trial     Urine culture neg      F/E/N:    Plan: TF - glucerna 1 2 at 66 with 200 q4 FWF   Replete lytes PRN   Trend hypernatremia - can increase FWF as needed      Heme/Onc:    Diagnosis: blood loss anemia  o Plan: trend hgb      Endo:    Diagnosis: hyperglycemia 2/2 T2DM exacerbated by hydrocortisone  o Plan: tapering hydrocortisone  o Insulin glargine 30 BID  o Increased SSI on 1/29 at 2AM  o S/p DKA tx   Diagnosis: adrenal insufficiency  o Plan: taper solucortef  - Appreciate endocrine recs      ID:    Diagnosis: Covid 19  o Plan: s/p severe pathway, now on NC  o S/p 10 d decadron (currently weaning stress steroids)  o Vitamins continue   Diagnosis: s/p vanc cefepime  o Plan: blood cultures negative to date  o Urine culture neg  o Monitor fever curve      MSK/Skin:    Diagnosis: POD 15 RLE AKA 2/2 embolic pulselessness  o Plan: local wound care  o Surgery following   Diagnosis: s/p elevated CK with likely rhabdo / compartment syndrome since resolved    Disposition: Continue Stepdown Level 1 level of care   Code Status: Level 1 - Full Code  ---------------------------------------------------------------------------------------  ICU CORE MEASURES    Prophylaxis   VTE Pharmacologic Prophylaxis: Enoxaparin (Lovenox)  VTE Mechanical Prophylaxis: sequential compression device  Stress Ulcer Prophylaxis: Omeprazole PO    ABCDE Protocol (if indicated)  Plan to perform spontaneous awakening trial today? Not applicable  Plan to perform spontaneous breathing trial today? Not applicable  Obvious barriers to extubation?  Not applicable  CAM-ICU: Negative    Invasive Devices Review  Invasive Devices     Peripheral Intravenous Line            Peripheral IV 01/26/21 Right Antecubital 2 days    Peripheral IV 01/26/21 Right;Ventral (anterior) Forearm 2 days          Drain            NG/OG/Enteral Tube Nasogastric 18 Fr Left nares 2 days    External Urinary Catheter Medium 1 day              Can any invasive devices be discontinued today? Yes  ---------------------------------------------------------------------------------------  OBJECTIVE    Vitals   Vitals:    21 0131   BP:   132/76    BP Location: Left arm  Left arm    Pulse:   77    Resp: 20  20    Temp: 97 8 °F (36 6 °C)  97 7 °F (36 5 °C)    TempSrc: Oral  Oral    SpO2:  90% 92% 97%   Weight:       Height:         Temp (24hrs), Av 8 °F (36 6 °C), Min:97 6 °F (36 4 °C), Max:98 °F (36 7 °C)  Current: Temperature: 97 7 °F (36 5 °C)  Vitals:    21 0131   BP:    Pulse:    Resp:    Temp:    SpO2: 97%       Nasal Cannula O2 Flow Rate (L/min): 7 L/min    Invasive/non-invasive ventilation settings   Respiratory    Lab Data (Last 4 hours)    None         O2/Vent Data (Last 4 hours)    None                Physical Exam  Vitals signs and nursing note reviewed  Constitutional:       General: He is not in acute distress  Appearance: He is well-developed  He is not diaphoretic  HENT:      Head: Normocephalic and atraumatic  Nose: Nose normal    Eyes:      General: No scleral icterus  Conjunctiva/sclera: Conjunctivae normal       Pupils: Pupils are equal, round, and reactive to light  Neck:      Musculoskeletal: Normal range of motion and neck supple  Trachea: No tracheal deviation  Cardiovascular:      Rate and Rhythm: Normal rate and regular rhythm  Heart sounds: Normal heart sounds  No murmur  Pulmonary:      Effort: Pulmonary effort is normal  No respiratory distress  Breath sounds: Normal breath sounds  No stridor  No wheezing  Comments: 8L   Abdominal:      General: Bowel sounds are normal  There is no distension  Palpations: Abdomen is soft  Tenderness: There is no abdominal tenderness  There is no guarding  Musculoskeletal: Normal range of motion  General: Deformity (RL amputation) present  No tenderness     Skin:     General: Skin is warm and dry       Capillary Refill: Capillary refill takes less than 2 seconds  Neurological:      General: No focal deficit present  Mental Status: He is alert  He is disoriented  Cranial Nerves: No cranial nerve deficit  Sensory: No sensory deficit  Motor: No abnormal muscle tone        Comments: disoriented         Laboratory and Diagnostics:  Results from last 7 days   Lab Units 01/28/21  0600 01/27/21  0501 01/26/21  0529 01/25/21  0436 01/24/21  0447 01/23/21  0434 01/22/21  0435   WBC Thousand/uL 8 83 10 37* 15 38* 8 37 7 27 10 63* 13 56*   HEMOGLOBIN g/dL 8 4* 8 5* 9 2* 7 5* 7 5* 8 1* 8 9*   HEMATOCRIT % 28 8* 28 1* 29 4* 23 7* 24 1* 25 5* 27 2*   PLATELETS Thousands/uL 172 193 209 147* 149 177 193   NEUTROS PCT % 85*  --   --   --   --  89*  --    MONOS PCT % 4  --   --   --   --  4  --      Results from last 7 days   Lab Units 01/28/21  0600 01/27/21  0512 01/26/21 2004 01/26/21  1422 01/26/21  0529 01/25/21  0436 01/24/21 0447 01/23/21  0434   SODIUM mmol/L 146* 146* 143 143 139 137 139 138   POTASSIUM mmol/L 3 6 4 1 4 3 3 0* 2 7* 3 5 3 7 3 9   CHLORIDE mmol/L 115* 117* 113* 112* 109* 106 107 107   CO2 mmol/L 27 25 27 27 24 26 25 26   ANION GAP mmol/L 4 4 3* 4 6 5 7 5   BUN mg/dL 27* 30* 28* 26* 26* 33* 32* 27*   CREATININE mg/dL 0 60 0 57* 0 56* 0 52* 0 60 0 60 0 68 0 55*   CALCIUM mg/dL 7 8* 7 8* 7 5* 7 8* 7 7* 7 5* 7 9* 7 6*   GLUCOSE RANDOM mg/dL 282* 133 163* 116 167* 139 147* 154*   ALT U/L  --   --   --   --   --   --   --  52   AST U/L  --   --   --   --   --   --   --  43   ALK PHOS U/L  --   --   --   --   --   --   --  148*   ALBUMIN g/dL  --   --   --   --   --   --   --  1 3*   TOTAL BILIRUBIN mg/dL  --   --   --   --   --   --   --  0 59     Results from last 7 days   Lab Units 01/27/21  0512 01/26/21  0529 01/25/21  0436 01/23/21  0434 01/22/21  0516   MAGNESIUM mg/dL 2 4 2 0 2 0 2 0  --    PHOSPHORUS mg/dL 2 5 2 2* 2 7 3 3 3 4                   ABG:  Results from last 7 days Lab Units 01/25/21  0436   PH ART  7 438   PCO2 ART mm Hg 31 6*   PO2 ART mm Hg 90 4   HCO3 ART mmol/L 20 9*   BASE EXC ART mmol/L -2 8   ABG SOURCE  Line, Arterial     VBG:  Results from last 7 days   Lab Units 01/25/21  0436   ABG SOURCE  Line, Arterial     Results from last 7 days   Lab Units 01/25/21  0436 01/24/21  0447 01/22/21  0606   PROCALCITONIN ng/ml 0 57* 0 85* 0 75*       Micro  Results from last 7 days   Lab Units 01/23/21  1735 01/23/21  0721   SPUTUM CULTURE   --  2+ Growth of    GRAM STAIN RESULT   --  1+ Epithelial cells per low power field*  1+ Polys*  Rare Gram positive cocci in pairs*  Rare Yeast*   MRSA CULTURE ONLY  No Methicillin Resistant Staphlyococcus aureus (MRSA) isolated  --          Imaging:   XR chest portable ICU   Final Result by Hali Delong MD (01/22 0348)      Right jugular catheter at cavoatrial junction with no pneumothorax      No change in extensive bilateral groundglass opacity due to Covid 19 pneumonia  Workstation performed: [id number]         XR chest portable ICU   Final Result by Hali Delong MD (01/20 0600)      Worsening bilateral groundglass opacity due to Covid 19  Workstation performed: [id number]         VAS upper limb venous duplex scan, complete, bilateral   Final Result by Marianela Friedman MD (01/17 6012)      XR chest portable ICU   Final Result by Judd Jennings MD (01/15 8090)      1  Stable opacities   2  Stable lines and tubes                  Workstation performed: [id number]         IR lower extremity angiogram   Final Result by Sheng Guzman (01/22 7277)      XR chest portable   Final Result by Juan Carlos Maier MD (01/13 6751)         1  Interim slight improvement in bilateral interstitial and alveolar groundglass opacification is noted consistent with Covid 19 pneumonia with possible interim improvement of superimposed edema  2   Lines and tubes as noted                    Workstation performed: [id number] I have personally reviewed pertinent reports  Intake and Output  I/O       01/27 0701 - 01/28 0700 01/28 0701 - 01/29 0700    I V  (mL/kg) 114 9 (1 5)     NG/ 150    Feedings 1288 781    Total Intake(mL/kg) 2002 9 (25 4) 931 (11 8)    Urine (mL/kg/hr) 1385 (0 7) 825 (0 4)    Stool 0 0    Total Output 1385 825    Net +617 9 +106          Unmeasured Urine Occurrence 1 x 1 x    Unmeasured Stool Occurrence 4 x 1 x        UOP:     Intake/Output Summary (Last 24 hours) at 1/29/2021 0328  Last data filed at 1/28/2021 2033  Gross per 24 hour   Intake 1413 ml   Output 1225 ml   Net 188 ml         Height and Weights   Height: 5' 6" (167 6 cm)  IBW: 63 8 kg  Body mass index is 28 11 kg/m²  Weight (last 2 days)     Date/Time   Weight    01/27/21 0532   79 (174 16)                Nutrition       Diet Orders   (From admission, onward)             Start     Ordered    01/28/21 1020  Diet Enteral/Parenteral; Tube Feeding No Oral Diet; Glucerna 1 2; Continuous; 68; 200; Water; Every 4 hours  Diet effective now     Question Answer Comment   Diet Type Enteral/Parenteral    Enteral/Parenteral Tube Feeding No Oral Diet    Tube Feeding Formula: Glucerna 1 2    Bolus/Cyclic/Continuous Continuous    Tube Feeding Goal Rate (mL/hr): 68    Tube Feeding flush (mL): 200    Water Flush type: Water    Water flush frequency: Every 4 hours    RD to adjust diet per protocol?  Yes        01/28/21 1019              TF currently running a t goal, see plan    Active Medications  Scheduled Meds:  Current Facility-Administered Medications   Medication Dose Route Frequency Provider Last Rate    acetaminophen  650 mg Oral Q6H PRN Sal Seymour PA-C      bisacodyl  10 mg Rectal Daily PRN Sal Seymour PA-C      cholecalciferol  2,000 Units Oral Daily Sal Seymour PA-C      Diclofenac Sodium  4 g Topical 4x Daily Sal Seymour PA-C      enoxaparin  1 mg/kg Subcutaneous Q12H [facility] Sal Seymour PA-C      gabapentin  400 mg Oral TID Lawyer Kelsi PA-C      glycopyrrolate  1 mg Oral TID Lawyer Kelsi PA-C      hydrocortisone sodium succinate  25 mg Intravenous Q12H [address] MD Laly Rhoades ON 1/30/2021] hydrocortisone sodium succinate  25 mg Intravenous Once Quique Moya MD      [facility] Hold] HYDROmorphone  0 5 mg Intravenous Q3H PRN Ervin Vazquez MD      [facility] Hold] HYDROmorphone  0 5 mg Intravenous Q3H PRN Ervin Vazquez MD      insulin glargine  30 Units Subcutaneous Q12H [address] Quique Moya MD      insulin lispro  1-6 Units Subcutaneous Q6H [address] Lawyer Kelsi PA-C      levalbuterol  1 25 mg Nebulization TID Lawyer Kelsi PA-C      Lidocaine Viscous HCl  15 mL Swish & Spit 4x Daily PRN Lawyer Kelsi PA-C      lisinopril  10 mg Oral Daily Quique Moya MD      menthol-methyl salicylate   Apply externally 4x Daily PRN Lawyer Kelsi PA-C      methadone  23 mg Per PEG Tube Q8H [address] Quique Moya MD      multivitamin-minerals  1 tablet Oral Daily Lawyer Kelsi PA-C      omeprazole (PRILOSEC) suspension 2 mg/mL  20 mg Oral Early Morning Herbert Jose De Jesus Bundy PA-C      phenol  1 spray Mouth/Throat Q2H PRN Lawyer Kelsi PA-C      polyethylene glycol  17 g Oral Daily Dishacarleen Bundy PA-C      senna  1 tablet Oral BID Disha OFELIA Bundy PA-C      sodium chloride  3 mL Nebulization TID Herbert Jose De Jesus Bundy PA-C       Continuous Infusions:     PRN Meds:   acetaminophen, 650 mg, Q6H PRN  bisacodyl, 10 mg, Daily PRN  [MAR Hold] HYDROmorphone, 0 5 mg, Q3H PRN  [MAR Hold] HYDROmorphone, 0 5 mg, Q3H PRN  Lidocaine Viscous HCl, 15 mL, 4x Daily PRN  menthol-methyl salicylate, , 4x Daily PRN  phenol, 1 spray, Q2H PRN        Allergies   Allergies   Allergen Reactions    Varenicline      ---------------------------------------------------------------------------------------  Advance Directive and Living Will:      Power of :    POLST: ---------------------------------------------------------------------------------------    Gabriel Henriquez MD      Portions of the record may have been created with voice recognition software  Occasional wrong word or "sound a like" substitutions may have occurred due to the inherent limitations of voice recognition software    Read the chart carefully and recognize, using context, where substitutions have occurred

## 2021-01-29 NOTE — PLAN OF CARE
Problem: PHYSICAL THERAPY ADULT  Goal: Performs mobility at highest level of function for planned discharge setting  See evaluation for individualized goals  Description: Treatment/Interventions: Functional transfer training, LE strengthening/ROM, Therapeutic exercise, Endurance training, Bed mobility          See flowsheet documentation for full assessment, interventions and recommendations  Outcome: Progressing  Note: Prognosis: Fair  Problem List: Decreased strength, Decreased endurance, Impaired balance, Decreased mobility, Pain, Decreased cognition, Impaired judgement, Decreased safety awareness  Assessment: Patient seen for session for setup, bed mobility, time spent on edge of bed, time spent to reposition  Patient is cooperative with session, more interactive  Nontender appropriately to questions  Note improved sitting tolerance, but with poor to poor minus sitting balance with multiple losses of balance and continue with loss of balance throughout session  Some attempt to correct loss of balance and posture with cues  Not appropriate to trial standing  Continue to recommend rehab at discharge  Barriers to Discharge: Inaccessible home environment, Decreased caregiver support     PT Discharge Recommendation: [address]     PT - OK to Discharge: (S) Yes(When stable to rehab)    See flowsheet documentation for full assessment

## 2021-01-29 NOTE — PHYSICAL THERAPY NOTE
Physical Therapy Treatment Note       01/29/21 1211   PT Last Visit   PT Visit Date 01/29/21   Note Type   Note Type Treatment   Pain Assessment   Pain Assessment Tool 0-10   Pain Score No Pain   Restrictions/Precautions   Weight Bearing Precautions Per Order Yes   RLE Weight Bearing Per Order NWB  (AKA)   Other Precautions Contact/isolation; Airborne/isolation;Multiple lines; Fall Risk;Pain  (Patient is COVID positive, on mid flow O2)   General   Chart Reviewed Yes   Family/Caregiver Present No   Cognition   Overall Cognitive Status Impaired   Arousal/Participation Arousable   Attention Attends with cues to redirect   Orientation Level Unable to assess   Memory Unable to assess   Following Commands Follows one step commands inconsistently   Subjective   Subjective States he wants water   Bed Mobility   Rolling R 3  Moderate assistance   Additional items Assist x 1   Rolling L 3  Moderate assistance   Additional items Assist x 1   Supine to Sit 2  Maximal assistance   Additional items Assist x 1   Sit to Supine 2  Maximal assistance   Additional items Assist x 1   Additional Comments Sat on edge of bed times 15 minutes, focus on tolerance  Maintains with mod to max assist of 1  Posterior and left-sided loss of balance noted, worsens as session progresses  Needs cues for head/neck posture  Balance   Static Sitting Poor   Dynamic Sitting Poor -   Endurance Deficit   Endurance Deficit Yes   Activity Tolerance   Activity Tolerance Patient limited by fatigue;Patient limited by pain;Treatment limited secondary to medical complications (Comment)   Nurse Made Aware Yes   Assessment   Prognosis Fair   Problem List Decreased strength;Decreased endurance; Impaired balance;Decreased mobility;Pain;Decreased cognition; Impaired judgement;Decreased safety awareness   Assessment Patient seen for session for setup, bed mobility, time spent on edge of bed, time spent to reposition    Patient is cooperative with session, more interactive  Nontender appropriately to questions  Note improved sitting tolerance, but with poor to poor minus sitting balance with multiple losses of balance and continue with loss of balance throughout session  Some attempt to correct loss of balance and posture with cues  Not appropriate to trial standing  Continue to recommend rehab at discharge  Goals   Patient Goals To have water   STG Expiration Date 02/10/21   PT Treatment Day 1   Plan   Treatment/Interventions Functional transfer training;LE strengthening/ROM; Therapeutic exercise; Endurance training;Patient/family training;Equipment eval/education; Bed mobility   Progress Slow progress, cognitive deficits   PT Frequency Other (Comment)  (2-3 times per week)   Recommendation   PT Discharge Recommendation Post-Acute Rehabilitation Services   Equipment Recommended Wheelchair   PT - OK to Discharge Yes  (When stable to rehab)   Jayne 8 in Bed Without Bedrails 2   Lying on Back to Sitting on Edge of Flat Bed 2   Moving Bed to Chair 1   Standing Up From Chair 1   Walk in Room 1   Climb 3-5 Stairs 1   Basic Mobility Inpatient Raw Score 8   Turning Head Towards Sound 3   Follow Simple Instructions 3   Low Function Basic Mobility Raw Score 14   Low Function Basic Mobility Standardized Score 22 01   Liliane Galeano PT, DPT CSRS

## 2021-01-29 NOTE — PLAN OF CARE
Problem: OCCUPATIONAL THERAPY ADULT  Goal: Performs self-care activities at highest level of function for planned discharge setting  See evaluation for individualized goals  Description: Treatment Interventions: ADL retraining, Functional transfer training, UE strengthening/ROM, Endurance training, Cognitive reorientation, Patient/family training, Equipment evaluation/education, Neuromuscular reeducation, Compensatory technique education, Continued evaluation, Energy conservation, Activityengagement          See flowsheet documentation for full assessment, interventions and recommendations  Outcome: Progressing  Note: Limitation: Decreased ADL status, Decreased Safe judgement during ADL, Decreased UE strength, Decreased UE ROM, Decreased cognition, Decreased endurance, Decreased self-care trans, Decreased high-level ADLs, Decreased sensation, Decreased fine motor control, Visual deficit  Prognosis: Fair  Assessment: Patient participated in Skilled OT session this date with interventions consisting of Energy Conservation techniques, deep breathing technique, therapeutic exercise to: increase functional use of BUEs, increase BUE muscle strength ,  therapeutic activities to: increase activity tolerance and increase OOB/ sitting tolerance   Upon arrival patient was found supine in bed  Pt demonstrated the following: MAX sup <> sit, MAX - MOD A to maintain EOB sitting  Pt with decreased tone and strength in RUE (approx 1/5 sh flex)  Completed therex on RUE: x10 each - sh flex, abd, elb flex/ext, wrist flex/ext  Pt with no reports of pain tho noted swelling in R hand  Pt demonstrating ability to  therapists hand ~3/5 strength  Desats to 85-90% with activity  Educated on deep breathing techs  Pillow placed under RUE while laying in bed for proper positioning    Patient continues to be functioning below baseline level, occupational performance remains limited secondary to factors listed above and increased risk for falls and injury  From OT standpoint, recommendation at time of d/c would be Short Term Rehab  Patient to benefit from continued Occupational Therapy treatment while in the hospital to address deficits as defined above and maximize level of functional independence with ADLs and functional mobility  The patient's raw score on the AM-PAC Daily Activity inpatient short form is  16, standardized score is  27 65, less than 39 4  Patients at this level are likely to benefit from DC to post-acute rehabilitation services  Please refer to the recommendation of the Occupational Therapist for safe DC planning  Pt was left in bed after session with all current needs met        OT Discharge Recommendation: Post-Acute Rehabilitation Services  OT - OK to Discharge: Yes(when medically stable)

## 2021-01-29 NOTE — OCCUPATIONAL THERAPY NOTE
OccupationalTherapy Progress Note     Patient Name: Rosa Wen  WFYIK'K Date: 1/29/2021  Problem List  Principal Problem:    Pneumonia due to COVID-19 virus  Active Problems:    DM2 (diabetes mellitus, type 2) ([facility] [address] )    Acute metabolic encephalopathy    Acute respiratory failure with hypoxia ([facility] )    Sepsis due to COVID-19 [facility])    Aortic thrombus ([facility] )    Anemia    Hypokalemia            01/29/21 1210   OT Last Visit   OT Visit Date 01/29/21   Note Type   Note Type Treatment   Restrictions/Precautions   Weight Bearing Precautions Per Order Yes   RLE Weight Bearing Per Order NWB  (AKA)   Other Precautions Contact/isolation; Airborne/isolation;Cognitive;Telemetry;O2;Fall Risk   General   Response to Previous Treatment Patient with no complaints from previous session   Lifestyle   Autonomy I ADLS/IADLS, transfers and functional mobility PTA   Reciprocal Relationships Pt lives w/ his siblings and nephews   Service to Others Pt works full time   Pain Assessment   Pain Assessment Tool Pain Assessment not indicated - pt denies pain   Pain Score No Pain   Bed Mobility   Supine to Sit 4  Minimal assistance   Additional items Assist x 1; Increased time required   Sit to Supine 2  Maximal assistance   Additional items Assist x 1; Increased time required   Additional Comments Pt maintained EOB sitting for ~ 5 min    Transfers   Sit to Stand Unable to assess   Stand to Sit Unable to assess   Therapeutic Exercise - ROM   UE-ROM Yes   ROM- Right Upper Extremities   R Shoulder PROM; Flexion; Extension;ABduction   R Elbow Elbow flexion;Elbow extension;PROM   R Wrist PROM; Wrist flexion;Wrist extension   R Position Seated   RUE ROM Comment Performed sitting EOB - noted swelling in hand  Pt demonstrates ability to  therapist's hand but unable to perform active sh flexion    Cognition   Arousal/Participation Responsive; Cooperative   Attention Attends with cues to redirect   Memory Unable to assess   Following Commands Follows one step commands with increased time or repetition   Comments Pt cooperative and pleasant t/o session, limited verbalizations    Activity Tolerance   Activity Tolerance Patient limited by fatigue;Treatment limited secondary to medical complications (Comment)  (decreased 02 - fluctuates between 85-90%)   Medical Staff Made Aware PT Norm    Assessment   Assessment Patient participated in Skilled OT session this date with interventions consisting of Energy Conservation techniques, deep breathing technique, therapeutic exercise to: increase functional use of BUEs, increase BUE muscle strength ,  therapeutic activities to: increase activity tolerance and increase OOB/ sitting tolerance   Upon arrival patient was found supine in bed  Pt demonstrated the following: MAX sup <> sit, MAX - MOD A to maintain EOB sitting  Pt with decreased tone and strength in RUE (approx 1/5 sh flex)  Completed therex on RUE: x10 each - sh flex, abd, elb flex/ext, wrist flex/ext  Pt with no reports of pain tho noted swelling in R hand  Pt demonstrating ability to  therapists hand ~3/5 strength  Desats to 85-90% with activity  Educated on deep breathing techs  Pillow placed under RUE while laying in bed for proper positioning  Patient continues to be functioning below baseline level, occupational performance remains limited secondary to factors listed above and increased risk for falls and injury  From OT standpoint, recommendation at time of d/c would be Short Term Rehab  Patient to benefit from continued Occupational Therapy treatment while in the hospital to address deficits as defined above and maximize level of functional independence with ADLs and functional mobility  The patient's raw score on the AM-PAC Daily Activity inpatient low functioning short form is 16, standardized score is  27 65, less than 39 4  Patients at this level are likely to benefit from DC to post-acute rehabilitation services   Please refer to the recommendation of the Occupational Therapist for safe DC planning  Pt was left in bed after session with all current needs met  Plan   Treatment Interventions ADL retraining;Functional transfer training;UE strengthening/ROM; Endurance training;Cognitive reorientation;Patient/family training;Equipment evaluation/education; Neuromuscular reeducation; Compensatory technique education;Continued evaluation; Energy conservation; Activityengagement   Goal Expiration Date 02/10/21   OT Frequency 2-3x/wk   Recommendation   OT Discharge Recommendation Post-Acute Rehabilitation Services   OT - OK to Discharge Yes  (when medically stable)   AM-PAC Daily Activity Inpatient   Lower Body Dressing 1   Bathing 1   Toileting 1   Upper Body Dressing 2   Grooming 2   Eating 2   Daily Activity Raw Score 9   Turning Head Towards Sound 4   Follow Simple Instructions 3   Low Function Daily Activity Raw Score 16   Low Function Daily Activity Standardized Score 27 65   AM-PAC Applied Cognition Inpatient   Following a Speech/Presentation 3   Understanding Ordinary Conversation 4   Taking Medications 2   Remembering Where Things Are Placed or Put Away 2   Remembering List of 4-5 Errands 2   Taking Care of Complicated Tasks 2   Applied Cognition Raw Score 15   Applied Cognition Standardized Score 33 54   Modified Brayan Scale   Modified Hendry Scale 4       Martha Pedraza MS, OTR/L

## 2021-01-30 PROBLEM — J38.3 VOCAL CORD DYSFUNCTION: Status: ACTIVE | Noted: 2021-01-30

## 2021-01-30 PROBLEM — G72.9 MYOPATHY: Status: ACTIVE | Noted: 2021-01-30

## 2021-01-30 PROBLEM — E27.40 ADRENAL INSUFFICIENCY (HCC): Status: ACTIVE | Noted: 2021-01-30

## 2021-01-30 LAB
ANION GAP SERPL CALCULATED.3IONS-SCNC: 2 MMOL/L (ref 4–13)
BUN SERPL-MCNC: 26 MG/DL (ref 5–25)
CALCIUM SERPL-MCNC: 7.7 MG/DL (ref 8.3–10.1)
CHLORIDE SERPL-SCNC: 112 MMOL/L (ref 100–108)
CO2 SERPL-SCNC: 31 MMOL/L (ref 21–32)
CREAT SERPL-MCNC: 0.46 MG/DL (ref 0.6–1.3)
ERYTHROCYTE [DISTWIDTH] IN BLOOD BY AUTOMATED COUNT: 17.6 % (ref 11.6–15.1)
GFR SERPL CREATININE-BSD FRML MDRD: 120 ML/MIN/1.73SQ M
GLUCOSE SERPL-MCNC: 128 MG/DL (ref 65–140)
GLUCOSE SERPL-MCNC: 164 MG/DL (ref 65–140)
GLUCOSE SERPL-MCNC: 170 MG/DL (ref 65–140)
GLUCOSE SERPL-MCNC: 178 MG/DL (ref 65–140)
HCT VFR BLD AUTO: 30.8 % (ref 36.5–49.3)
HGB BLD-MCNC: 9.2 G/DL (ref 12–17)
MAGNESIUM SERPL-MCNC: 2.2 MG/DL (ref 1.6–2.6)
MCH RBC QN AUTO: 29.8 PG (ref 26.8–34.3)
MCHC RBC AUTO-ENTMCNC: 29.9 G/DL (ref 31.4–37.4)
MCV RBC AUTO: 100 FL (ref 82–98)
PHOSPHATE SERPL-MCNC: 3.1 MG/DL (ref 2.3–4.1)
PLATELET # BLD AUTO: 144 THOUSANDS/UL (ref 149–390)
PMV BLD AUTO: 10.9 FL (ref 8.9–12.7)
POTASSIUM SERPL-SCNC: 4.1 MMOL/L (ref 3.5–5.3)
RBC # BLD AUTO: 3.09 MILLION/UL (ref 3.88–5.62)
SODIUM SERPL-SCNC: 145 MMOL/L (ref 136–145)
WBC # BLD AUTO: 9.43 THOUSAND/UL (ref 4.31–10.16)

## 2021-01-30 PROCEDURE — NC001 PR NO CHARGE: Performed by: NURSE PRACTITIONER

## 2021-01-30 PROCEDURE — 83735 ASSAY OF MAGNESIUM: CPT | Performed by: INTERNAL MEDICINE

## 2021-01-30 PROCEDURE — 85027 COMPLETE CBC AUTOMATED: CPT | Performed by: INTERNAL MEDICINE

## 2021-01-30 PROCEDURE — 84100 ASSAY OF PHOSPHORUS: CPT | Performed by: INTERNAL MEDICINE

## 2021-01-30 PROCEDURE — 94640 AIRWAY INHALATION TREATMENT: CPT

## 2021-01-30 PROCEDURE — 82948 REAGENT STRIP/BLOOD GLUCOSE: CPT

## 2021-01-30 PROCEDURE — 99233 SBSQ HOSP IP/OBS HIGH 50: CPT | Performed by: INTERNAL MEDICINE

## 2021-01-30 PROCEDURE — 92526 ORAL FUNCTION THERAPY: CPT

## 2021-01-30 PROCEDURE — 94760 N-INVAS EAR/PLS OXIMETRY 1: CPT

## 2021-01-30 PROCEDURE — 80048 BASIC METABOLIC PNL TOTAL CA: CPT | Performed by: EMERGENCY MEDICINE

## 2021-01-30 RX ORDER — CALCIUM GLUCONATE 20 MG/ML
1 INJECTION, SOLUTION INTRAVENOUS ONCE
Status: COMPLETED | OUTPATIENT
Start: 2021-01-30 | End: 2021-01-30

## 2021-01-30 RX ORDER — SODIUM CHLORIDE, SODIUM GLUCONATE, SODIUM ACETATE, POTASSIUM CHLORIDE, MAGNESIUM CHLORIDE, SODIUM PHOSPHATE, DIBASIC, AND POTASSIUM PHOSPHATE .53; .5; .37; .037; .03; .012; .00082 G/100ML; G/100ML; G/100ML; G/100ML; G/100ML; G/100ML; G/100ML
500 INJECTION, SOLUTION INTRAVENOUS ONCE
Status: COMPLETED | OUTPATIENT
Start: 2021-01-30 | End: 2021-01-30

## 2021-01-30 RX ADMIN — ISODIUM CHLORIDE 3 ML: 0.03 SOLUTION RESPIRATORY (INHALATION) at 13:25

## 2021-01-30 RX ADMIN — Medication 2000 UNITS: at 08:08

## 2021-01-30 RX ADMIN — INSULIN GLARGINE 40 UNITS: 100 INJECTION, SOLUTION SUBCUTANEOUS at 21:36

## 2021-01-30 RX ADMIN — INSULIN LISPRO 4 UNITS: 100 INJECTION, SOLUTION INTRAVENOUS; SUBCUTANEOUS at 11:48

## 2021-01-30 RX ADMIN — METHADONE HYDROCHLORIDE 23 MG: 10 CONCENTRATE ORAL at 14:24

## 2021-01-30 RX ADMIN — GLYCOPYRROLATE 1 MG: 1 TABLET ORAL at 08:08

## 2021-01-30 RX ADMIN — HYDROCORTISONE SODIUM SUCCINATE 25 MG: 100 INJECTION, POWDER, FOR SOLUTION INTRAMUSCULAR; INTRAVENOUS at 08:06

## 2021-01-30 RX ADMIN — INSULIN GLARGINE 40 UNITS: 100 INJECTION, SOLUTION SUBCUTANEOUS at 08:08

## 2021-01-30 RX ADMIN — GLYCOPYRROLATE 1 MG: 1 TABLET ORAL at 21:33

## 2021-01-30 RX ADMIN — ENOXAPARIN SODIUM 80 MG: 80 INJECTION SUBCUTANEOUS at 01:00

## 2021-01-30 RX ADMIN — GABAPENTIN 400 MG: 400 CAPSULE ORAL at 17:07

## 2021-01-30 RX ADMIN — LISINOPRIL 10 MG: 10 TABLET ORAL at 08:08

## 2021-01-30 RX ADMIN — ENOXAPARIN SODIUM 80 MG: 80 INJECTION SUBCUTANEOUS at 23:56

## 2021-01-30 RX ADMIN — ENOXAPARIN SODIUM 80 MG: 80 INJECTION SUBCUTANEOUS at 11:48

## 2021-01-30 RX ADMIN — Medication 1 TABLET: at 08:08

## 2021-01-30 RX ADMIN — GLYCOPYRROLATE 1 MG: 1 TABLET ORAL at 17:07

## 2021-01-30 RX ADMIN — ISODIUM CHLORIDE 3 ML: 0.03 SOLUTION RESPIRATORY (INHALATION) at 20:02

## 2021-01-30 RX ADMIN — DICLOFENAC SODIUM 4 G: 10 GEL TOPICAL at 21:54

## 2021-01-30 RX ADMIN — LEVALBUTEROL HYDROCHLORIDE 1.25 MG: 1.25 SOLUTION, CONCENTRATE RESPIRATORY (INHALATION) at 07:55

## 2021-01-30 RX ADMIN — LEVALBUTEROL HYDROCHLORIDE 1.25 MG: 1.25 SOLUTION, CONCENTRATE RESPIRATORY (INHALATION) at 13:25

## 2021-01-30 RX ADMIN — HYDROCORTISONE SODIUM SUCCINATE 25 MG: 100 INJECTION, POWDER, FOR SOLUTION INTRAMUSCULAR; INTRAVENOUS at 06:11

## 2021-01-30 RX ADMIN — HYDROCORTISONE SODIUM SUCCINATE 25 MG: 100 INJECTION, POWDER, FOR SOLUTION INTRAMUSCULAR; INTRAVENOUS at 21:35

## 2021-01-30 RX ADMIN — GABAPENTIN 400 MG: 400 CAPSULE ORAL at 21:33

## 2021-01-30 RX ADMIN — INSULIN LISPRO 4 UNITS: 100 INJECTION, SOLUTION INTRAVENOUS; SUBCUTANEOUS at 06:38

## 2021-01-30 RX ADMIN — DICLOFENAC SODIUM 4 G: 10 GEL TOPICAL at 11:45

## 2021-01-30 RX ADMIN — DICLOFENAC SODIUM 4 G: 10 GEL TOPICAL at 08:45

## 2021-01-30 RX ADMIN — SENNOSIDES 8.6 MG: 8.6 TABLET, FILM COATED ORAL at 08:08

## 2021-01-30 RX ADMIN — LEVALBUTEROL HYDROCHLORIDE 1.25 MG: 1.25 SOLUTION, CONCENTRATE RESPIRATORY (INHALATION) at 20:00

## 2021-01-30 RX ADMIN — METHADONE HYDROCHLORIDE 23 MG: 10 CONCENTRATE ORAL at 06:21

## 2021-01-30 RX ADMIN — METHADONE HYDROCHLORIDE 23 MG: 10 CONCENTRATE ORAL at 21:34

## 2021-01-30 RX ADMIN — Medication 20 MG: at 06:11

## 2021-01-30 RX ADMIN — DICLOFENAC SODIUM 4 G: 10 GEL TOPICAL at 17:15

## 2021-01-30 RX ADMIN — SODIUM CHLORIDE, SODIUM GLUCONATE, SODIUM ACETATE, POTASSIUM CHLORIDE, MAGNESIUM CHLORIDE, SODIUM PHOSPHATE, DIBASIC, AND POTASSIUM PHOSPHATE 500 ML: .53; .5; .37; .037; .03; .012; .00082 INJECTION, SOLUTION INTRAVENOUS at 02:14

## 2021-01-30 RX ADMIN — CALCIUM GLUCONATE 1 G: 20 INJECTION, SOLUTION INTRAVENOUS at 06:36

## 2021-01-30 RX ADMIN — ISODIUM CHLORIDE 3 ML: 0.03 SOLUTION RESPIRATORY (INHALATION) at 07:55

## 2021-01-30 RX ADMIN — POLYETHYLENE GLYCOL 3350 17 G: 17 POWDER, FOR SOLUTION ORAL at 08:09

## 2021-01-30 RX ADMIN — GABAPENTIN 400 MG: 400 CAPSULE ORAL at 08:09

## 2021-01-30 NOTE — PLAN OF CARE
Problem: Prexisting or High Potential for Compromised Skin Integrity  Goal: Skin integrity is maintained or improved  Description: INTERVENTIONS:  - Identify patients at risk for skin breakdown  - Assess and monitor skin integrity  - Assess and monitor nutrition and hydration status  - Monitor labs   - Assess for incontinence   - Turn and reposition patient  - Assist with mobility/ambulation  - Relieve pressure over bony prominences  - Avoid friction and shearing  - Provide appropriate hygiene as needed including keeping skin clean and dry  - Evaluate need for skin moisturizer/barrier cream  - Collaborate with interdisciplinary team   - Patient/family teaching  - Consider wound care consult   Outcome: Progressing     Problem: Potential for Falls  Goal: Patient will remain free of falls  Description: INTERVENTIONS:  - Assess patient frequently for physical needs  -  Identify cognitive and physical deficits and behaviors that affect risk of falls  -  [place] fall precautions as indicated by assessment   - Educate patient/family on patient safety including physical limitations  - Instruct patient to call for assistance with activity based on assessment  - Modify environment to reduce risk of injury  - Consider OT/PT consult to assist with strengthening/mobility  Outcome: Progressing     Problem: Nutrition/Hydration-ADULT  Goal: Nutrient/Hydration intake appropriate for improving, restoring or maintaining nutritional needs  Description: Monitor and assess patient's nutrition/hydration status for malnutrition  Collaborate with interdisciplinary team and initiate plan and interventions as ordered  Monitor patient's weight and dietary intake as ordered or per policy  Utilize nutrition screening tool and intervene as necessary  Determine patient's food preferences and provide high-protein, high-caloric foods as appropriate       INTERVENTIONS:  - Monitor oral intake, urinary output, labs, and treatment plans  - Assess nutrition and hydration status and recommend course of action  - Evaluate amount of meals eaten  - Assist patient with eating if necessary   - Allow adequate time for meals  - Recommend/ encourage appropriate diets, oral nutritional supplements, and vitamin/mineral supplements  - Order, calculate, and assess calorie counts as needed  - Recommend, monitor, and adjust tube feedings and TPN/PPN based on assessed needs  - Assess need for intravenous fluids  - Provide specific nutrition/hydration education as appropriate  - Include patient/family/caregiver in decisions related to nutrition  Outcome: Progressing     Problem: PAIN - ADULT  Goal: Verbalizes/displays adequate comfort level or baseline comfort level  Description: Interventions:  - Encourage patient to monitor pain and request assistance  - Assess pain using appropriate pain scale  - Administer analgesics based on type and severity of pain and evaluate response  - Implement non-pharmacological measures as appropriate and evaluate response  - Consider cultural and social influences on pain and pain management  - Notify physician/advanced practitioner if interventions unsuccessful or patient reports new pain  Outcome: Progressing     Problem: INFECTION - ADULT  Goal: Absence or prevention of progression during hospitalization  Description: INTERVENTIONS:  - Assess and monitor for signs and symptoms of infection  - Monitor lab/diagnostic results  - Monitor all insertion sites, i e  indwelling lines, tubes, and drains  - Monitor endotracheal if appropriate and nasal secretions for changes in amount and color  - [place] appropriate cooling/warming therapies per order  - Administer medications as ordered  - Instruct and encourage patient and family to use good hand hygiene technique  - Identify and instruct in appropriate isolation precautions for identified infection/condition  Outcome: Progressing  Goal: Absence of fever/infection during neutropenic period  Description: INTERVENTIONS:  - Monitor WBC    Outcome: Progressing     Problem: SAFETY ADULT  Goal: Patient will remain free of falls  Description: INTERVENTIONS:  - Assess patient frequently for physical needs  -  Identify cognitive and physical deficits and behaviors that affect risk of falls    -  [place] fall precautions as indicated by assessment   - Educate patient/family on patient safety including physical limitations  - Instruct patient to call for assistance with activity based on assessment  - Modify environment to reduce risk of injury  - Consider OT/PT consult to assist with strengthening/mobility  Outcome: Progressing  Goal: Maintain or return to baseline ADL function  Description: INTERVENTIONS:  -  Assess patient's ability to carry out ADLs; assess patient's baseline for ADL function and identify physical deficits which impact ability to perform ADLs (bathing, care of mouth/teeth, toileting, grooming, dressing, etc )  - Assess/evaluate cause of self-care deficits   - Assess range of motion  - Assess patient's mobility; develop plan if impaired  - Assess patient's need for assistive devices and provide as appropriate  - Encourage maximum independence but intervene and supervise when necessary  - Involve family in performance of ADLs  - Assess for home care needs following discharge   - Consider OT consult to assist with ADL evaluation and planning for discharge  - Provide patient education as appropriate  Outcome: Progressing  Goal: Maintain or return mobility status to optimal level  Description: INTERVENTIONS:  - Assess patient's baseline mobility status (ambulation, transfers, stairs, etc )    - Identify cognitive and physical deficits and behaviors that affect mobility  - Identify mobility aids required to assist with transfers and/or ambulation (gait belt, sit-to-stand, lift, walker, cane, etc )  - [place] fall precautions as indicated by assessment  - Record patient progress and toleration of activity level on Mobility SBAR; progress patient to next Phase/Stage  - Instruct patient to call for assistance with activity based on assessment  - Consider rehabilitation consult to assist with strengthening/weightbearing, etc   Outcome: Progressing     Problem: DISCHARGE PLANNING  Goal: Discharge to home or other facility with appropriate resources  Description: INTERVENTIONS:  - Identify barriers to discharge w/patient and caregiver  - Arrange for needed discharge resources and transportation as appropriate  - Identify discharge learning needs (meds, wound care, etc )  - Arrange for interpretive services to assist at discharge as needed  - Refer to Case Management Department for coordinating discharge planning if the patient needs post-hospital services based on physician/advanced practitioner order or complex needs related to functional status, cognitive ability, or social support system  Outcome: Progressing     Problem: Knowledge Deficit  Goal: Patient/family/caregiver demonstrates understanding of disease process, treatment plan, medications, and discharge instructions  Description: Complete learning assessment and assess knowledge base    Interventions:  - Provide teaching at level of understanding  - Provide teaching via preferred learning methods  Outcome: Progressing

## 2021-01-30 NOTE — ASSESSMENT & PLAN NOTE
· S/p extubation 1/25  · Speech following  · NPO-continue NGT for TF  · ENT consult  · Chloraseptic spray  · glycopyrolate

## 2021-01-30 NOTE — SPEECH THERAPY NOTE
Speech Language/Pathology    Speech/Language Pathology Progress Note    Patient Name: Vernon Louis  HVVTK'R Date: 1/30/2021     Problem List  Principal Problem:    Pneumonia due to COVID-19 virus  Active Problems:    DM2 (diabetes mellitus, type 2) ([place] [address] )    Acute metabolic encephalopathy    Acute respiratory failure with hypoxia ([place] [address] )    Sepsis due to COVID-19 [facility])    Aortic thrombus ([facility])    Anemia    Hypokalemia       Past Medical History  Past Medical History:   Diagnosis Date    Diabetes mellitus ([place] [address] )     GERD (gastroesophageal reflux disease)     Hypercholesteremia     Hypertension     Methadone use ([place] [address] )         Past Surgical History  Past Surgical History:   Procedure Laterality Date    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    Procedure: AMPUTATION ABOVE KNEE (AKA); Surgeon: Cain Marcial MD;  [facility];  Service: Vascular    AMPUTATION ABOVE KNEE (AKA) Right 1/18/2021    Procedure: AMPUTATION ABOVE KNEE (AKA) FORMALIZATION,  R AKA wound washout, wound closure;  Surgeon: Cain Marcial MD;  [facility];  Service: Vascular    ARTERIOGRAM Right 1/13/2021    Procedure: ARTERIOGRAM;  Surgeon: Wayne Talavera DO;  [facility];  Service: Vascular    IR LOWER EXTREMITY ANGIOGRAM  1/14/2021    THROMBECTOMY W/ EMBOLECTOMY Right 1/13/2021    Procedure: EMBOLECTOMY/THROMBECTOMY LOWER EXTREMITY;  Surgeon: Wayne Talavera DO;  [facility];  Service: Vascular         Subjective:  "Anything is good" Patient is awake and alert today  Objective: The patient is seen for f/u dysphagia therapy  He continues to be NPO with NG feeds and is on NC  Oral care is provided with oral swabs for the removal of a moderate amount of thick, dried secretions from lips and tongue  He continues with scabs on corners of lips which do min bleed and cause some pain  Despite this, he tolerates oral care well and is eager to trial PO   The patient is assessed with approx 10 tsp honey thick liquids and 3 tsp applesauce  Oral closure and retrieval of bolus is adequate  A-P transfer time is min prolonged, with min delayed swallow initiation time  Laryngeal rise appears min reduced with palpation, but multiple swallows were not observed today  He has intermittent weak cough observed, but also is observed prior to any PO intake  O2 begins at 92 and drops to 88%  However, he does recover to 90%  Assessment:  Patient tolerated PO trials better today, but continues at high risk of aspiration  Plan/Recommendations:  Continue NPO and continue ST  Can consider VBS since isolation precautions have been lifted

## 2021-01-30 NOTE — ASSESSMENT & PLAN NOTE
Lab Results   Component Value Date    HGBA1C 11 6 (H) 12/09/2020       Recent Labs     01/29/21  1804 01/29/21  2352 01/30/21  0635 01/30/21  1133   POCGLU 163* 122 178* 170*       Blood Sugar Average: Last 72 hrs:  (P) 201 [id number]   Lab Results   Component Value Date    HGBA1C 11 6 (H) 12/09/2020       Recent Labs     01/29/21  1804 01/29/21  2352 01/30/21  0635 01/30/21  1133   POCGLU 163* 122 178* 170*       Blood Sugar Average: Last 72 hrs:  (P) 265 [id number]     Finished hydrocortisone taper  Lantus 40 Units SQ HS and SSI with accuchecks  Goal 140-180

## 2021-01-30 NOTE — NURSING NOTE
RN entered room to round on pt  Pt's nasal cannula noted to be out of place  Pt noted to be statting at 93% on RA  Nasal cannula removed at this time  Will continue to monitor

## 2021-01-30 NOTE — PROGRESS NOTES
Transfer/Progress Note [place] Liter 1960, 56 y o  male MRN: [id number]    Unit/Bed#: -01 Encounter: [id number]    Primary Care Provider: VERONICA Clifford   Date and time admitted to hospital: 1/13/2021  2:28 PM        * Pneumonia due to COVID-19 virus  Assessment & Plan  · Completed cefepime and Vancomycin  · BC NGTD  · Trend WBC and fever curve    Sepsis due to COVID-19 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  · Continue lovenox  ·     DM2 (diabetes mellitus, type 2) ([facility] [address] )  Assessment & Plan  Lab Results   Component Value Date    HGBA1C 11 6 (H) 12/09/2020       Recent Labs     01/29/21  1804 01/29/21  2352 01/30/21  0635 01/30/21  1133   POCGLU 163* 122 178* 170*       Blood Sugar Average: Last 72 hrs:  (P) 123 [id number]     Finished hydrocortisone taper  Lantus 40 Units SQ HS and SSI with accuchecks  Goal 140-180    HTN (hypertension)  Assessment & Plan  · Continue lisinopril    Vocal cord dysfunction  Assessment & Plan  · S/p extubation 1/25  · Speech following  · NPO-continue NGT for TF  · ENT consult  · Chloraseptic spray  · glycopyrolate    Adrenal insufficiency (HCC)  Assessment & Plan  · Secondary to sepsis  · Finished hydrocortisone taper today 1/30  · 48 hour stim test 2/2    Myopathy  Assessment & Plan  · Secondary to critical illness and steroid use  · PT/OT    Hypokalemia  Assessment & Plan  · Replete prn  · Trend and monitor tele    Anemia  Assessment & Plan  · Currently 9 2, secondary to critical illness  · Continue to trend      Code Status: Level 1 - Full Code  POA:    POLST:      Reason for ICU admission:   Acute hypoxic respiratory failure    Active problems:   Principal Problem:    Pneumonia due to COVID-19 virus  Active Problems:    Acute respiratory failure with hypoxia (HCC)    Sepsis due to COVID-19 ([facility] [address] )    DM2 (diabetes mellitus, type 2) (HCC)    Aortic thrombus (HCC)    Acute metabolic encephalopathy    HTN (hypertension)    Anemia    Hypokalemia    Myopathy    Adrenal insufficiency (HCC)    Vocal cord dysfunction  Resolved Problems:    Metabolic acidosis, increased anion gap    Acute kidney injury ([facility] [address] )    Hypernatremia      Consultants:   Acute Pain  PMR  Vascular Surgery  GI  Urology    History of Present Illness:   As per Dr Shane Bar and P,"Ben Woo is a 56 y o  male, with a history significant for diabetes mellitus type 2, hypertension, hyperlipidemia, hepatitis-C, GERD, coronavirus diagnosis on 12/31, who presents, as a transfer, for evaluation for vascular surgery due to acute limb ischemia of the right lower extremity  This is believed to be secondary to emboli from aortic thrombus to this limb       Patient initially presented to [facility] on 1/11 with nausea, vomiting, shortness of breath, altered mental status  Prior to arrival, patient was unable to eat, drink, or take medication secondary to his nausea/vomiting  He is found to be in DKA and he was subsequently transferred to [facility]  While hospitalized, patient had worsening respiratory status and was intubated on 01/12  Prior to this event, his hemoglobin drawn and he had developed hematemesis with "coffee-ground" appearing emesis  Patient was evaluated by gastroenterology; his hemoglobin stabilized and his OG tube showed no evidence of blood; he was cleared for anticoagulation from their standpoint  Furthermore, while hospitalized, patient developed hematuria and was evaluated by urology; they performed cystoscopy, cleared clots, and recommended that a 3 way Veras be placed and left in for 7 days     Notably, the patient's right lower extremity is pale and cool and vascular surgery was consulted  The patient was found to have acute limb ischemia, suspected to be secondary to emboli from aortic thrombus on CTA yesterday, and he was transferred for evaluation for potential surgical intervention      In regards to the DKA, the anion gap is closed and the patient's glucose has normalized      Gastroenterology, Urology, Vascular surgery have evaluated      Patient is currently being ventilated  CMV, 16, 460, 6, 60  Plateau pressure 28       Over the last day, patient's inflammatory markers have been increasing   from 01/08, D-dimer 18 65 from 8, procalcitonin 1 6 for from 0 31, ferritin 1608 from 1314  Patient also noted to have thrombocytopenia that predated initiation of heparin  Patient is also hypernatremic on presentation and it was rapidly corrected  Moderate bacteria seen on UA microscopy      Venous duplex on 01/12 revealed acute nonocclusive deep vein thrombosis of the right lower extremity  CTA on 01/12 chest abdomen pelvis revealed pulmonary emboli  Summary of clinical course:   Patient underwent a right AKA amputation and was continued on IV antibiotics and fluid resuscitated for rhabdo  He was then started on TPN  He had to be diuresed secondary to increased fluid status  On the 18th he was taken back to the OR for formalization of the AKA for washout and consequently was started on IV methadone and gabapentin  He continued with and received Vanco and cefepime x5 days  On the 25th of January was able to weaned off the ventilator and extubated a continue to require supplemental oxygen  Once extubated he was noted to have problems speaking secondary to vocal cord injury  ENT was consulted along with speech  Patient was unable to speak or swallow and an NG tube was placed for tube feedings   He was noted to have adrenal insufficiency and received hydrocortisone and completed a taper today  He remains hemodynamically stable and saturating well on 5 L Midflow  HE is stable for downgrade to SD 2     Recent or scheduled procedures:    1/14 right AKA amputation  1/18 from a station AKA  1/25 extubated  1/26 NG placed for tube feeds    Outstanding/pending diagnostics:   Am labs    Cultures:   1/23 MRSA negative  1/21 BC x 2 NGTD       Mobilization Plan:   PT/OT pending     Nutrition Plan:   Speech following-currently NPO 2/2 dysphagia related to vocal cord injury  Continue TF via NGT    Invasive Devices Review  Invasive Devices     Peripheral Intravenous Line            Peripheral IV 01/30/21 Left;Ventral (anterior) Forearm less than 1 day          Drain            NG/OG/Enteral Tube Nasogastric 18 Fr Left nares 4 days    External Urinary Catheter Medium less than 1 day                Rationale for remaining devices: NA    VTE Pharmacologic Prophylaxis: Enoxaparin (Lovenox)  VTE Mechanical Prophylaxis: sequential compression device    Discharge Plan:   Patient should be ready for discharge when medically stable    Initial Physical Therapy Recommendations: ongoing  Initial Occupational Therapy Recommendations: ongoing  Initial /Plan: pending    Home medications that are not reordered and reason why:   N/A    Spoke with Dr She Haynes  regarding transfer  Please contact critical care via [employer] with any questions or concerns  Portions of the record may have been created with voice recognition software  Occasional wrong word or "sound a like" substitutions may have occurred due to the inherent limitations of voice recognition software  Read the chart carefully and recognize, using context, where substitutions have occurred

## 2021-01-30 NOTE — ASSESSMENT & PLAN NOTE
· Secondary to COVID 19  · Extubated 1/25  · Currently on 5L  · Wean Fio2 for Sp>90%  · Pulmonary toiletting

## 2021-01-30 NOTE — PROGRESS NOTES
Patient exhibiting low urinary output overnight  Enedina Lovett from MICU made aware  500 cc Isolyte fluid bolus administered and lab work drawn  2 attempts made to insert new peripheral IV access unsuccessful  However, current IV's accessible for IV fluid administration  Will follow up with urine output post fluid bolus and follow up with lab work once resulted

## 2021-01-30 NOTE — PLAN OF CARE
Problem: Prexisting or High Potential for Compromised Skin Integrity  Goal: Skin integrity is maintained or improved  Description: INTERVENTIONS:  - Identify patients at risk for skin breakdown  - Assess and monitor skin integrity  - Assess and monitor nutrition and hydration status  - Monitor labs   - Assess for incontinence   - Turn and reposition patient  - Assist with mobility/ambulation  - Relieve pressure over bony prominences  - Avoid friction and shearing  - Provide appropriate hygiene as needed including keeping skin clean and dry  - Evaluate need for skin moisturizer/barrier cream  - Collaborate with interdisciplinary team   - Patient/family teaching  - Consider wound care consult   Outcome: Progressing     Problem: Potential for Falls  Goal: Patient will remain free of falls  Description: INTERVENTIONS:  - Assess patient frequently for physical needs  -  Identify cognitive and physical deficits and behaviors that affect risk of falls  -  [place] fall precautions as indicated by assessment   - Educate patient/family on patient safety including physical limitations  - Instruct patient to call for assistance with activity based on assessment  - Modify environment to reduce risk of injury  - Consider OT/PT consult to assist with strengthening/mobility  Outcome: Progressing     Problem: Potential for Falls  Goal: Patient will remain free of falls  Description: INTERVENTIONS:  - Assess patient frequently for physical needs  -  Identify cognitive and physical deficits and behaviors that affect risk of falls    -  [place] fall precautions as indicated by assessment   - Educate patient/family on patient safety including physical limitations  - Instruct patient to call for assistance with activity based on assessment  - Modify environment to reduce risk of injury  - Consider OT/PT consult to assist with strengthening/mobility  Outcome: Progressing     Problem: Nutrition/Hydration-ADULT  Goal: Nutrient/Hydration intake appropriate for improving, restoring or maintaining nutritional needs  Description: Monitor and assess patient's nutrition/hydration status for malnutrition  Collaborate with interdisciplinary team and initiate plan and interventions as ordered  Monitor patient's weight and dietary intake as ordered or per policy  Utilize nutrition screening tool and intervene as necessary  Determine patient's food preferences and provide high-protein, high-caloric foods as appropriate       INTERVENTIONS:  - Monitor oral intake, urinary output, labs, and treatment plans  - Assess nutrition and hydration status and recommend course of action  - Evaluate amount of meals eaten  - Assist patient with eating if necessary   - Allow adequate time for meals  - Recommend/ encourage appropriate diets, oral nutritional supplements, and vitamin/mineral supplements  - Order, calculate, and assess calorie counts as needed  - Recommend, monitor, and adjust tube feedings and TPN/PPN based on assessed needs  - Assess need for intravenous fluids  - Provide specific nutrition/hydration education as appropriate  - Include patient/family/caregiver in decisions related to nutrition  Outcome: Progressing     Problem: PAIN - ADULT  Goal: Verbalizes/displays adequate comfort level or baseline comfort level  Description: Interventions:  - Encourage patient to monitor pain and request assistance  - Assess pain using appropriate pain scale  - Administer analgesics based on type and severity of pain and evaluate response  - Implement non-pharmacological measures as appropriate and evaluate response  - Consider cultural and social influences on pain and pain management  - Notify physician/advanced practitioner if interventions unsuccessful or patient reports new pain  Outcome: Progressing     Problem: INFECTION - ADULT  Goal: Absence or prevention of progression during hospitalization  Description: INTERVENTIONS:  - Assess and monitor for signs and symptoms of infection  - Monitor lab/diagnostic results  - Monitor all insertion sites, i e  indwelling lines, tubes, and drains  - Monitor endotracheal if appropriate and nasal secretions for changes in amount and color  - [place] appropriate cooling/warming therapies per order  - Administer medications as ordered  - Instruct and encourage patient and family to use good hand hygiene technique  - Identify and instruct in appropriate isolation precautions for identified infection/condition  Outcome: Progressing  Goal: Absence of fever/infection during neutropenic period  Description: INTERVENTIONS:  - Monitor WBC    Outcome: Progressing     Problem: SAFETY ADULT  Goal: Patient will remain free of falls  Description: INTERVENTIONS:  - Assess patient frequently for physical needs  -  Identify cognitive and physical deficits and behaviors that affect risk of falls    -  [place] fall precautions as indicated by assessment   - Educate patient/family on patient safety including physical limitations  - Instruct patient to call for assistance with activity based on assessment  - Modify environment to reduce risk of injury  - Consider OT/PT consult to assist with strengthening/mobility  Outcome: Progressing  Goal: Maintain or return to baseline ADL function  Description: INTERVENTIONS:  -  Assess patient's ability to carry out ADLs; assess patient's baseline for ADL function and identify physical deficits which impact ability to perform ADLs (bathing, care of mouth/teeth, toileting, grooming, dressing, etc )  - Assess/evaluate cause of self-care deficits   - Assess range of motion  - Assess patient's mobility; develop plan if impaired  - Assess patient's need for assistive devices and provide as appropriate  - Encourage maximum independence but intervene and supervise when necessary  - Involve family in performance of ADLs  - Assess for home care needs following discharge   - Consider OT consult to assist with ADL evaluation and planning for discharge  - Provide patient education as appropriate  Outcome: Progressing  Goal: Maintain or return mobility status to optimal level  Description: INTERVENTIONS:  - Assess patient's baseline mobility status (ambulation, transfers, stairs, etc )    - Identify cognitive and physical deficits and behaviors that affect mobility  - Identify mobility aids required to assist with transfers and/or ambulation (gait belt, sit-to-stand, lift, walker, cane, etc )  - [place] fall precautions as indicated by assessment  - Record patient progress and toleration of activity level on Mobility SBAR; progress patient to next Phase/Stage  - Instruct patient to call for assistance with activity based on assessment  - Consider rehabilitation consult to assist with strengthening/weightbearing, etc   Outcome: Progressing     Problem: DISCHARGE PLANNING  Goal: Discharge to home or other facility with appropriate resources  Description: INTERVENTIONS:  - Identify barriers to discharge w/patient and caregiver  - Arrange for needed discharge resources and transportation as appropriate  - Identify discharge learning needs (meds, wound care, etc )  - Arrange for interpretive services to assist at discharge as needed  - Refer to Case Management Department for coordinating discharge planning if the patient needs post-hospital services based on physician/advanced practitioner order or complex needs related to functional status, cognitive ability, or social support system  Outcome: Progressing     Problem: Knowledge Deficit  Goal: Patient/family/caregiver demonstrates understanding of disease process, treatment plan, medications, and discharge instructions  Description: Complete learning assessment and assess knowledge base    Interventions:  - Provide teaching at level of understanding  - Provide teaching via preferred learning methods  Outcome: Progressing

## 2021-01-30 NOTE — ASSESSMENT & PLAN NOTE
Lab Results   Component Value Date    HGBA1C 11 6 (H) 12/09/2020       Recent Labs     01/29/21  1804 01/29/21  2352 01/30/21  0635 01/30/21  1133   POCGLU 163* 122 178* 170*       Blood Sugar Average: Last 72 hrs:  (P) 548 [id number]     Finished hydrocortisone taper  Lantus 40 Units SQ HS and SSI with accuchecks  Goal 140-180

## 2021-01-30 NOTE — PROGRESS NOTES
Daily Progress Note - Critical Care   Fadumo Esqueda 56 y o  male MRN: [id number]  Unit/Bed#: -01 Encounter: [id number]        ----------------------------------------------------------------------------------------  HPI/24hr events: patient with poor UOP overnight  Was bladder scanned for just > 100  He was given a small fluid bolus with had additional 125cc UOP  Will bladder scan at this time with plan to straight cath according to urinary retention protocol     ---------------------------------------------------------------------------------------  SUBJECTIVE  Patient able to verbalize to me that he has no pain, slept, and had an ok night   He feels like he's breathing better and has no abd pain    Review of Systems  Review of systems was unable to be performed secondary to difficulty speaking  ---------------------------------------------------------------------------------------  Assessment and Plan:    Neuro:    Diagnosis: Pain - APS following  o Plan: methadone 23q8  o neurontin 400TID     delirium precautions   Plan: seroquel q night      CV:    Diagnosis: HTN  o Plan: lisinopril   Diagnosis: dvt/aortic thrombus/ 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  o Plan: vasc surgery formalization complete  o Monitor clincally  o Therapeutic lovenox 1/kg    Pulm:   Diagnosis: hypox resp failure  o Plan: s/p extubation 1/25  o    Diagnosis: prior PE  o Plan: full dose lovenox   Vocal cord dysfunction  o Chloraseptic  o glycopyrolate  o Speech therapy  o Npo  o ENT consult    GI:    Diagnosis: aspiration  o Plan: speech therapy  o npo   Diagnosis: bowel regimen on methadone  o Plan: miralax  o Senna  o Colace   GERD  o omeprazole      :    Diagnosis: monitor UOP  o Plan: poor OP overnight  o Plan: given fluid bolus overnight  o Getting TF  o BMP stable overnight, trend into AM   Urinary stricture  o S/p rogers  o S/p hematuria  o Monitor output  o Poor output overnight  o External catheter at this time  - Bladder scanned for 115 pre 500cc bolus      Intake/Output Summary (Last 24 hours) at 1/30/2021 0441  Last data filed at 1/30/2021 0130  Gross per 24 hour   Intake 1240 ml   Output 240 ml   Net 1000 ml       F/E/N:    Plan: per speech therapy  Continue NPO for now w/ oral care  SLP will continue to follow for upgrades as able  Continue tube feeds per NG - glucerna 1 2 at 68 with 200 q4 water flushes      Heme/Onc:   o Diagnosis: daily cbc    Endo:   o Diagnosis: adrenal insuff  - Finishing hydrocortisone taper  - Final dose today, 48 hr, stim test  o Monitor glucose off steroids, will need decrease in SSI      ID:    Diagnosis: covid 19  o Plan: on NC  o S/p steroids  o vitamins   Diagnosis: s/p vanc cefepime  o Plan: urine blood cultures negative  o Afebrile overnight      MSK/Skin:    Diagnosis: s/p POD 16 after AKA   o Plan: local wound care        Disposition: Transfer to Stepdown Level 2  Code Status: Level 1 - Full Code  ---------------------------------------------------------------------------------------  ICU CORE MEASURES    Prophylaxis   VTE Pharmacologic Prophylaxis: Enoxaparin (Lovenox)  VTE Mechanical Prophylaxis: sequential compression device  Stress Ulcer Prophylaxis: Omeprazole PO    ABCDE Protocol (if indicated)  Plan to perform spontaneous awakening trial today? Not applicable  Plan to perform spontaneous breathing trial today? Not applicable  Obvious barriers to extubation? Not applicable  CAM-ICU: Positive    Invasive Devices Review  Invasive Devices     Peripheral Intravenous Line            Peripheral IV 01/26/21 Right Antecubital 3 days    Peripheral IV 01/26/21 Right;Ventral (anterior) Forearm 3 days          Drain            NG/OG/Enteral Tube Nasogastric 18 Fr Left nares 3 days    External Urinary Catheter Small less than 1 day              Can any invasive devices be discontinued today?  Not applicable  ---------------------------------------------------------------------------------------  OBJECTIVE    Vitals   Vitals:    21 2020 21 2145 21 2215 21 0000   BP:  111/57     BP Location:       Pulse:  75 66    Resp:       Temp:   98 5 °F (36 9 °C)    TempSrc:   Axillary    SpO2: 98% 97% 98% 99%   Weight:       Height:         Temp (24hrs), Av 2 °F (36 8 °C), Min:98 °F (36 7 °C), Max:98 5 °F (36 9 °C)  Current: Temperature: 98 5 °F (36 9 °C)  Vitals:    21 0000   BP:    Pulse:    Resp:    Temp:    SpO2: 99%         Respiratory:    Nasal Cannula O2 Flow Rate (L/min): 8 L/min    Invasive/non-invasive ventilation settings   Respiratory    Lab Data (Last 4 hours)    None         O2/Vent Data (Last 4 hours)    None                Physical Exam  Vitals signs and nursing note reviewed  Constitutional:       General: He is not in acute distress  Appearance: He is well-developed  He is ill-appearing  He is not toxic-appearing or diaphoretic  HENT:      Head: Normocephalic and atraumatic  Nose: Nose normal       Mouth/Throat:      Mouth: Mucous membranes are dry  Eyes:      General: No scleral icterus  Conjunctiva/sclera: Conjunctivae normal       Pupils: Pupils are equal, round, and reactive to light  Neck:      Musculoskeletal: Normal range of motion and neck supple  Trachea: No tracheal deviation  Cardiovascular:      Rate and Rhythm: Normal rate and regular rhythm  Heart sounds: Normal heart sounds  No murmur  Pulmonary:      Effort: Pulmonary effort is normal  No respiratory distress  Breath sounds: Normal breath sounds  No stridor  No wheezing  Comments: Breathing comfortably on 8L NC  Abdominal:      General: Bowel sounds are normal  There is no distension  Palpations: Abdomen is soft  Tenderness: There is no abdominal tenderness  There is no right CVA tenderness, left CVA tenderness, guarding or rebound  Genitourinary:     Comments: Some skin breakdown in the groin    Condom catheter  Musculoskeletal: Normal range of motion  General: Deformity (right AKA amputation, clean dry) present  No tenderness  Skin:     General: Skin is warm and dry  Capillary Refill: Capillary refill takes less than 2 seconds  Neurological:      Mental Status: He is alert  He is disoriented  Cranial Nerves: No cranial nerve deficit  Sensory: No sensory deficit  Motor: No abnormal muscle tone           Laboratory and Diagnostics:  Results from last 7 days   Lab Units 01/30/21 0238 01/29/21  0931 01/29/21  0736 01/28/21  0600 01/27/21  0501 01/26/21  0529 01/25/21  0436   WBC Thousand/uL 9 43 8 66 5 20 8 83 10 37* 15 38* 8 37   HEMOGLOBIN g/dL 9 2* 9 2* 5 2* 8 4* 8 5* 9 2* 7 5*   HEMATOCRIT % 30 8* 30 5* 17 8* 28 8* 28 1* 29 4* 23 7*   PLATELETS Thousands/uL 144* 165 77* 172 193 209 147*   NEUTROS PCT %  --   --   --  85*  --   --   --    MONOS PCT %  --   --   --  4  --   --   --      Results from last 7 days   Lab Units 01/30/21 0238 01/29/21  0909 01/28/21  0600 01/27/21  0512 01/26/21 2004 01/26/21  1422 01/26/21  0529   SODIUM mmol/L 145 146* 146* 146* 143 143 139   POTASSIUM mmol/L 4 1 3 5 3 6 4 1 4 3 3 0* 2 7*   CHLORIDE mmol/L 112* 114* 115* 117* 113* 112* 109*   CO2 mmol/L 31 31 27 25 27 27 24   ANION GAP mmol/L 2* 1* 4 4 3* 4 6   BUN mg/dL 26* 28* 27* 30* 28* 26* 26*   CREATININE mg/dL 0 46* 0 52* 0 60 0 57* 0 56* 0 52* 0 60   CALCIUM mg/dL 7 7* 8 4 7 8* 7 8* 7 5* 7 8* 7 7*   GLUCOSE RANDOM mg/dL 164* 249* 282* 133 163* 116 167*     Results from last 7 days   Lab Units 01/30/21 0238 01/29/21  0909 01/27/21  0512 01/26/21  0529 01/25/21  0436   MAGNESIUM mg/dL 2 2 2 2 2 4 2 0 2 0   PHOSPHORUS mg/dL 3 1  --  2 5 2 2* 2 7                   ABG:  Results from last 7 days   Lab Units 01/25/21  0436   PH ART  7 438   PCO2 ART mm Hg 31 6*   PO2 ART mm Hg 90 4   HCO3 ART mmol/L 20 9*   BASE EXC ART mmol/L -2 8   ABG SOURCE  Line, Arterial     VBG:  Results from last 7 days   Lab Units 01/25/21  0436   ABG SOURCE  Line, Arterial     Results from last 7 days   Lab Units 01/25/21  0436 01/24/21  0447   PROCALCITONIN ng/ml 0 57* 0 85*       Micro  Results from last 7 days   Lab Units 01/23/21  1735 01/23/21  0721   SPUTUM CULTURE   --  2+ Growth of    GRAM STAIN RESULT   --  1+ Epithelial cells per low power field*  1+ Polys*  Rare Gram positive cocci in pairs*  Rare Yeast*   MRSA CULTURE ONLY  No Methicillin Resistant Staphlyococcus aureus (MRSA) isolated  --        Imaging:   XR chest portable ICU   Final Result by Nba Gan MD (01/22 2481)      Right jugular catheter at cavoatrial junction with no pneumothorax      No change in extensive bilateral groundglass opacity due to Covid 19 pneumonia  Workstation performed: [id number]         XR chest portable ICU   Final Result by Nba Gan MD (01/20 4213)      Worsening bilateral groundglass opacity due to Covid 19  Workstation performed: [id number]         VAS upper limb venous duplex scan, complete, bilateral   Final Result by Belkis Petit MD (01/17 5683)      XR chest portable ICU   Final Result by Sara Tavares MD (01/15 3320)      1  Stable opacities   2  Stable lines and tubes                  Workstation performed: [id number]         IR lower extremity angiogram   Final Result by Tiny Bowling (01/22 1437)      XR chest portable   Final Result by Arville Hamman, MD (01/13 7781)         1  Interim slight improvement in bilateral interstitial and alveolar groundglass opacification is noted consistent with Covid 19 pneumonia with possible interim improvement of superimposed edema  2   Lines and tubes as noted  Workstation performed: [id number]            I have personally reviewed pertinent reports        Intake and Output  I/O       01/28 0701 - 01/29 0700 01/29 0701 - 01/30 0700    NG/ 700 Feedings 781 540    Total Intake(mL/kg) 931 (11 8) 1240 (15 7)    Urine (mL/kg/hr) 825 (0 4) 240 (0 1)    Emesis/NG output  0    Stool 0 0    Total Output 825 240    Net +106 +1000          Unmeasured Urine Occurrence 1 x     Unmeasured Stool Occurrence 1 x 1 x        UOP:     Intake/Output Summary (Last 24 hours) at 1/30/2021 0503  Last data filed at 1/30/2021 0130  Gross per 24 hour   Intake 1240 ml   Output 240 ml   Net 1000 ml       Height and Weights   Height: 5' 6" (167 6 cm)  IBW: 63 8 kg  Body mass index is 28 11 kg/m²  Weight (last 2 days)     None            Nutrition       Diet Orders   (From admission, onward)             Start     Ordered    01/28/21 1020  Diet Enteral/Parenteral; Tube Feeding No Oral Diet; Glucerna 1 2; Continuous; 68; 200; Water; Every 4 hours  Diet effective now     Question Answer Comment   Diet Type Enteral/Parenteral    Enteral/Parenteral Tube Feeding No Oral Diet    Tube Feeding Formula: Glucerna 1 2    Bolus/Cyclic/Continuous Continuous    Tube Feeding Goal Rate (mL/hr): 68    Tube Feeding flush (mL): 200    Water Flush type: Water    Water flush frequency: Every 4 hours    RD to adjust diet per protocol?  Yes        01/28/21 1019              TF currently running at 68 1 2 glucerna     Active Medications  Scheduled Meds:  Current Facility-Administered Medications   Medication Dose Route Frequency Provider Last Rate    acetaminophen  650 mg Oral Q6H PRN Jeanette Spironello V, CRNP      bisacodyl  10 mg Rectal Daily PRN Ramila Gosselin V, CRNP      cholecalciferol  2,000 Units Oral Daily Jeanette Spironello V, CRNP      Diclofenac Sodium  4 g Topical 4x Daily Jeanette Spironello V, CRNP      enoxaparin  1 mg/kg Subcutaneous Q12H [address] Jeanette Spironello V, CRNP      gabapentin  400 mg Oral TID Jeanette Spironello V, CRNP      glycopyrrolate  1 mg Oral TID Jeanette Spironello V, CRNP      hydrocortisone sodium succinate  25 mg Intravenous Q12H [address] Jeanette Spironello V, CRNP      hydrocortisone sodium succinate  25 mg Intravenous Once [employer], CRNP      HYDROmorphone  0 5 mg Intravenous Q3H PRN Jeanette Spironello V, CRNP      HYDROmorphone  0 5 mg Intravenous Q3H PRN Jeanette Spironello V, CRNP      insulin glargine  40 Units Subcutaneous Q12H [facility] Jeanette Spironello V, CRNP      insulin lispro  4-20 Units Subcutaneous Q6H [facility] Jeanette Spiromitzio V, CRNP      levalbuterol  1 25 mg Nebulization TID [employer] V, CRNP      Lidocaine Viscous HCl  15 mL Swish & Spit 4x Daily PRN Jeanette Spironello V, CRNP      lisinopril  10 mg Oral Daily Jeanette Spironello V, CRNP      menthol-methyl salicylate   Apply externally 4x Daily PRN [employer], CRNP      methadone  23 mg Per PEG Tube Q8H [facility] Jeanette Spironello V, CRNP      multivitamin-minerals  1 tablet Oral Daily Jeanette Spironello V, CRNP      omeprazole (PRILOSEC) suspension 2 mg/mL  20 mg Oral Early Morning Jeanette Spironello V, CRNP      phenol  1 spray Mouth/Throat Q2H PRN Jeanette Spironello V, CRNP      polyethylene glycol  17 g Oral Daily Jeanette Spironello V, CRNP      senna  1 tablet Oral BID Jeanette Spironello V, CRNP      sodium chloride  3 mL Nebulization TID Jeanette Darylnello V, CRNP       Continuous Infusions:     PRN Meds:   acetaminophen, 650 mg, Q6H PRN  bisacodyl, 10 mg, Daily PRN  HYDROmorphone, 0 5 mg, Q3H PRN  HYDROmorphone, 0 5 mg, Q3H PRN  Lidocaine Viscous HCl, 15 mL, 4x Daily PRN  menthol-methyl salicylate, , 4x Daily PRN  phenol, 1 spray, Q2H PRN        Allergies   Allergies   Allergen Reactions    Varenicline      ---------------------------------------------------------------------------------------  Advance Directive and Living Will:      Power of :    POLST:    ---------------------------------------------------------------------------------------    Alaina Aguilar MD      Portions of the record may have been created with voice recognition software  Occasional wrong word or "sound a like" substitutions may have occurred due to the inherent limitations of voice recognition software    Read the chart carefully and recognize, using context, where substitutions have occurred

## 2021-01-31 ENCOUNTER — APPOINTMENT (INPATIENT)
Dept: RADIOLOGY | Facility: HOSPITAL | Age: 61
DRG: 853 | End: 2021-01-31
Payer: COMMERCIAL

## 2021-01-31 LAB
CORTIS AM PEAK SERPL-MCNC: 12.4 UG/DL (ref 4.2–22.4)
GLUCOSE SERPL-MCNC: 102 MG/DL (ref 65–140)
GLUCOSE SERPL-MCNC: 105 MG/DL (ref 65–140)
GLUCOSE SERPL-MCNC: 132 MG/DL (ref 65–140)
GLUCOSE SERPL-MCNC: 42 MG/DL (ref 65–140)
GLUCOSE SERPL-MCNC: 45 MG/DL (ref 65–140)
GLUCOSE SERPL-MCNC: 46 MG/DL (ref 65–140)
GLUCOSE SERPL-MCNC: 49 MG/DL (ref 65–140)
GLUCOSE SERPL-MCNC: 54 MG/DL (ref 65–140)
GLUCOSE SERPL-MCNC: 63 MG/DL (ref 65–140)
GLUCOSE SERPL-MCNC: 63 MG/DL (ref 65–140)
GLUCOSE SERPL-MCNC: 82 MG/DL (ref 65–140)
GLUCOSE SERPL-MCNC: 88 MG/DL (ref 65–140)
GLUCOSE SERPL-MCNC: 89 MG/DL (ref 65–140)
GLUCOSE SERPL-MCNC: 99 MG/DL (ref 65–140)

## 2021-01-31 PROCEDURE — 99232 SBSQ HOSP IP/OBS MODERATE 35: CPT | Performed by: GENERAL PRACTICE

## 2021-01-31 PROCEDURE — 94664 DEMO&/EVAL PT USE INHALER: CPT

## 2021-01-31 PROCEDURE — 94760 N-INVAS EAR/PLS OXIMETRY 1: CPT

## 2021-01-31 PROCEDURE — 82533 TOTAL CORTISOL: CPT | Performed by: STUDENT IN AN ORGANIZED HEALTH CARE EDUCATION/TRAINING PROGRAM

## 2021-01-31 PROCEDURE — 94640 AIRWAY INHALATION TREATMENT: CPT

## 2021-01-31 PROCEDURE — 94668 MNPJ CHEST WALL SBSQ: CPT

## 2021-01-31 PROCEDURE — 71045 X-RAY EXAM CHEST 1 VIEW: CPT

## 2021-01-31 PROCEDURE — 74018 RADEX ABDOMEN 1 VIEW: CPT

## 2021-01-31 PROCEDURE — 92526 ORAL FUNCTION THERAPY: CPT

## 2021-01-31 PROCEDURE — 82948 REAGENT STRIP/BLOOD GLUCOSE: CPT

## 2021-01-31 RX ORDER — DEXTROSE MONOHYDRATE 25 G/50ML
INJECTION, SOLUTION INTRAVENOUS
Status: DISPENSED
Start: 2021-01-31 | End: 2021-02-01

## 2021-01-31 RX ORDER — ECHINACEA PURPUREA EXTRACT 125 MG
1 TABLET ORAL
Status: DISCONTINUED | OUTPATIENT
Start: 2021-01-31 | End: 2021-02-25 | Stop reason: HOSPADM

## 2021-01-31 RX ORDER — INSULIN GLARGINE 100 [IU]/ML
40 INJECTION, SOLUTION SUBCUTANEOUS
Status: DISCONTINUED | OUTPATIENT
Start: 2021-01-31 | End: 2021-01-31

## 2021-01-31 RX ORDER — DEXTROSE MONOHYDRATE 25 G/50ML
INJECTION, SOLUTION INTRAVENOUS
Status: COMPLETED
Start: 2021-01-31 | End: 2021-01-31

## 2021-01-31 RX ORDER — INSULIN GLARGINE 100 [IU]/ML
40 INJECTION, SOLUTION SUBCUTANEOUS
Status: DISCONTINUED | OUTPATIENT
Start: 2021-02-01 | End: 2021-02-01

## 2021-01-31 RX ORDER — DEXTROSE MONOHYDRATE 50 MG/ML
75 INJECTION, SOLUTION INTRAVENOUS CONTINUOUS
Status: DISCONTINUED | OUTPATIENT
Start: 2021-01-31 | End: 2021-01-31

## 2021-01-31 RX ORDER — INSULIN GLARGINE 100 [IU]/ML
10 INJECTION, SOLUTION SUBCUTANEOUS
Status: DISCONTINUED | OUTPATIENT
Start: 2021-01-31 | End: 2021-01-31

## 2021-01-31 RX ORDER — DEXTROSE MONOHYDRATE 25 G/50ML
50 INJECTION, SOLUTION INTRAVENOUS ONCE
Status: COMPLETED | OUTPATIENT
Start: 2021-01-31 | End: 2021-01-31

## 2021-01-31 RX ORDER — DEXTROSE MONOHYDRATE 100 MG/ML
100 INJECTION, SOLUTION INTRAVENOUS CONTINUOUS
Status: DISCONTINUED | OUTPATIENT
Start: 2021-01-31 | End: 2021-02-01

## 2021-01-31 RX ORDER — INSULIN GLARGINE 100 [IU]/ML
20 INJECTION, SOLUTION SUBCUTANEOUS
Status: DISCONTINUED | OUTPATIENT
Start: 2021-01-31 | End: 2021-01-31

## 2021-01-31 RX ADMIN — DICLOFENAC SODIUM 4 G: 10 GEL TOPICAL at 16:07

## 2021-01-31 RX ADMIN — GABAPENTIN 400 MG: 400 CAPSULE ORAL at 16:15

## 2021-01-31 RX ADMIN — GABAPENTIN 400 MG: 400 CAPSULE ORAL at 08:31

## 2021-01-31 RX ADMIN — GLYCOPYRROLATE 1 MG: 1 TABLET ORAL at 16:16

## 2021-01-31 RX ADMIN — Medication 2000 UNITS: at 08:31

## 2021-01-31 RX ADMIN — DEXTROSE MONOHYDRATE: 500 INJECTION PARENTERAL at 18:30

## 2021-01-31 RX ADMIN — GABAPENTIN 400 MG: 400 CAPSULE ORAL at 21:52

## 2021-01-31 RX ADMIN — DICLOFENAC SODIUM 4 G: 10 GEL TOPICAL at 08:31

## 2021-01-31 RX ADMIN — SENNOSIDES 8.6 MG: 8.6 TABLET, FILM COATED ORAL at 08:31

## 2021-01-31 RX ADMIN — INSULIN GLARGINE 40 UNITS: 100 INJECTION, SOLUTION SUBCUTANEOUS at 08:31

## 2021-01-31 RX ADMIN — LEVALBUTEROL HYDROCHLORIDE 1.25 MG: 1.25 SOLUTION, CONCENTRATE RESPIRATORY (INHALATION) at 13:24

## 2021-01-31 RX ADMIN — LEVALBUTEROL HYDROCHLORIDE 1.25 MG: 1.25 SOLUTION, CONCENTRATE RESPIRATORY (INHALATION) at 08:04

## 2021-01-31 RX ADMIN — METHADONE HYDROCHLORIDE 23 MG: 10 CONCENTRATE ORAL at 13:40

## 2021-01-31 RX ADMIN — GLYCOPYRROLATE 1 MG: 1 TABLET ORAL at 08:32

## 2021-01-31 RX ADMIN — DEXTROSE 75 ML/HR: 5 SOLUTION INTRAVENOUS at 12:34

## 2021-01-31 RX ADMIN — Medication 1 TABLET: at 08:31

## 2021-01-31 RX ADMIN — DEXTROSE MONOHYDRATE 50 ML: 500 INJECTION PARENTERAL at 13:39

## 2021-01-31 RX ADMIN — LEVALBUTEROL HYDROCHLORIDE 1.25 MG: 1.25 SOLUTION, CONCENTRATE RESPIRATORY (INHALATION) at 20:02

## 2021-01-31 RX ADMIN — ENOXAPARIN SODIUM 80 MG: 80 INJECTION SUBCUTANEOUS at 12:04

## 2021-01-31 RX ADMIN — LISINOPRIL 10 MG: 10 TABLET ORAL at 08:31

## 2021-01-31 RX ADMIN — DEXTROSE MONOHYDRATE 50 ML: 500 INJECTION PARENTERAL at 06:28

## 2021-01-31 RX ADMIN — METHADONE HYDROCHLORIDE 23 MG: 10 CONCENTRATE ORAL at 08:38

## 2021-01-31 RX ADMIN — DEXTROSE MONOHYDRATE 50 ML/HR: 100 INJECTION, SOLUTION INTRAVENOUS at 15:54

## 2021-01-31 RX ADMIN — POLYETHYLENE GLYCOL 3350 17 G: 17 POWDER, FOR SOLUTION ORAL at 08:30

## 2021-01-31 RX ADMIN — GLYCOPYRROLATE 1 MG: 1 TABLET ORAL at 21:52

## 2021-01-31 RX ADMIN — SENNOSIDES 8.6 MG: 8.6 TABLET, FILM COATED ORAL at 16:15

## 2021-01-31 RX ADMIN — ISODIUM CHLORIDE 3 ML: 0.03 SOLUTION RESPIRATORY (INHALATION) at 13:24

## 2021-01-31 RX ADMIN — ISODIUM CHLORIDE 3 ML: 0.03 SOLUTION RESPIRATORY (INHALATION) at 08:04

## 2021-01-31 RX ADMIN — DICLOFENAC SODIUM 4 G: 10 GEL TOPICAL at 21:53

## 2021-01-31 RX ADMIN — ISODIUM CHLORIDE 3 ML: 0.03 SOLUTION RESPIRATORY (INHALATION) at 20:02

## 2021-01-31 RX ADMIN — DICLOFENAC SODIUM 4 G: 10 GEL TOPICAL at 12:00

## 2021-01-31 NOTE — ASSESSMENT & PLAN NOTE
· S/p extubation 1/25  · Speech following  · NPO-continue NGT for TF  · Consider ENT consult  · Chloraseptic spray  · glycopyrolate

## 2021-01-31 NOTE — PROGRESS NOTES
Upon attempt to give pt meds, it was noted during NGT flush that pt would immediately regurgitate fluid/mild emesis from their mouth only during active flushing  NGT placement check was attempted with no air heard in stomach  SLIM notified, CXR w/ KUB ordered  After xray completed, CC notified to check placement  No clear placement of NGT appreciated on xray  Bs also checked on pt, and bs continues to remain low @ 49  1 amp of dextrose given IV, will recheck bs in 20-30 mins

## 2021-01-31 NOTE — PROGRESS NOTES
Progress Note Celia Bella 1960, 56 y o  male MRN: [id number]    Unit/Bed#: -01 Encounter: [id number]    Primary Care Provider: VERONICA Mccullough   Date and time admitted to hospital: 1/13/2021  2:28 PM        * Pneumonia due to COVID-19 virus  Assessment & Plan  · Completed cefepime and Vancomycin  · BC neg  · Trend WBC and fever curve    Acute respiratory failure with hypoxia ([facility] [address] )  Assessment & Plan  · Secondary to COVID 19  · Extubated 1/25  · Currently on 5L  · Now on 2L  · Pulmonary toiletting    Vocal cord dysfunction  Assessment & Plan  · S/p extubation 1/25  · Speech following  · NPO-continue NGT for TF  · Consider ENT consult  · Chloraseptic spray  · glycopyrolate    Adrenal insufficiency ([facility] [address] )  Assessment & Plan  · Secondary to sepsis  · Finished hydrocortisone taper 1/30  · 48 hour stim test 2/2  · Endo consult    Myopathy  Assessment & Plan  · Secondary to critical illness and steroid use  · PT/OT    Anemia  Assessment & Plan  · Currently 9 2, secondary to critical illness  · Continue to trend    Aortic thrombus (HCC)  Assessment & Plan  · 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  · Continue lovenox  ·     Sepsis due to COVID-19 [facility])  Assessment & Plan  · BC x 2 NGTD  · Completed Vanco and Cefepime  · Trend WBC and fever curve    DM2 (diabetes mellitus, type 2) ([facility] [address] )  Assessment & Plan  Lab Results   Component Value Date    HGBA1C 11 6 (H) 12/09/2020       Recent Labs     01/31/21  0615 01/31/21  0621 01/31/21  0650 01/31/21  1206   POCGLU 46* 42* 132 54*       Blood Sugar Average: Last 72 hrs:  (P) 714 [id number]     Finished hydrocortisone taper  Lantus 40 Units SQ HS and SSI with accuchecks  Goal 140-180  Note pt got Lantus 40U this AM despite low BS so will hold not give any Lantus until tomorrow night  Endo consult      HTN (hypertension)  Assessment & Plan  · Continue lisinopril    VTE Pharmacologic Prophylaxis:   Pharmacologic: Enoxaparin (Lovenox)  Mechanical VTE Prophylaxis in Place: Yes    Patient Centered Rounds: I have performed bedside rounds with nursing staff today  Discussions with Specialists or Other Care Team Provider: endo    Education and Discussions with Family / Patient: pt and nephew    Time Spent for Care: 30 minutes  More than 50% of total time spent on counseling and coordination of care as described above  Current Length of Stay: 18 day(s)    Current Patient Status: Inpatient   Certification Statement: The patient will continue to require additional inpatient hospital stay due to need to monitor BSs and steroids    Discharge Plan: pending progress    Code Status: Level 1 - Full Code      Subjective:   Says he can't breathe  Feels same as yesterday    Objective:     Vitals:   Temp (24hrs), Av 5 °F (36 9 °C), Min:97 9 °F (36 6 °C), Max:99 2 °F (37 3 °C)    Temp:  [97 9 °F (36 6 °C)-99 2 °F (37 3 °C)] 98 5 °F (36 9 °C)  HR:  [70-78] 70  Resp:  [18] 18  BP: (120-136)/(63-95) 136/95  SpO2:  [91 %-100 %] 100 %  Body mass index is 28 79 kg/m²  Input and Output Summary (last 24 hours): Intake/Output Summary (Last 24 hours) at 2021 1337  Last data filed at 2021 1103  Gross per 24 hour   Intake 2326 ml   Output 1351 ml   Net 975 ml       Physical Exam:     Physical Exam  HENT:      Head: Normocephalic and atraumatic  Nose: Nose normal       Mouth/Throat:      Mouth: Mucous membranes are moist    Eyes:      Extraocular Movements: Extraocular movements intact  Conjunctiva/sclera: Conjunctivae normal    Neck:      Musculoskeletal: Normal range of motion and neck supple  Cardiovascular:      Rate and Rhythm: Normal rate and regular rhythm  Pulmonary:      Effort: Pulmonary effort is normal       Comments: decreased  Abdominal:      General: Bowel sounds are normal  There is no distension  Palpations: Abdomen is soft  Tenderness: There is no abdominal tenderness     Musculoskeletal: Normal range of motion  Right lower leg: No edema  Left lower leg: No edema  Skin:     General: Skin is warm and dry  Neurological:      Mental Status: He is alert and oriented to person, place, and time  Mental status is at baseline  Additional Data:     Labs:    Results from last 7 days   Lab Units 01/30/21  0238  01/28/21  0600   WBC Thousand/uL 9 43   < > 8 83   HEMOGLOBIN g/dL 9 2*   < > 8 4*   HEMATOCRIT % 30 8*   < > 28 8*   PLATELETS Thousands/uL 144*   < > 172   NEUTROS PCT %  --   --  85*   LYMPHS PCT %  --   --  10*   MONOS PCT %  --   --  4   EOS PCT %  --   --  0    < > = values in this interval not displayed  Results from last 7 days   Lab Units 01/30/21  0238   POTASSIUM mmol/L 4 1   CHLORIDE mmol/L 112*   CO2 mmol/L 31   BUN mg/dL 26*   CREATININE mg/dL 0 46*   CALCIUM mg/dL 7 7*           * I Have Reviewed All Lab Data Listed Above  * Additional Pertinent Lab Tests Reviewed:  [facility]        Recent Cultures (last 7 days):           Last 24 Hours Medication List:   Current Facility-Administered Medications   Medication Dose Route Frequency Provider Last Rate    acetaminophen  650 mg Oral Q6H PRN Shannan A Sedora, CRNP      bisacodyl  10 mg Rectal Daily PRN Kaylin Manges, CRNP      cholecalciferol  2,000 Units Oral Daily Shannan A Sedora, CRNP      dextrose  75 mL/hr Intravenous Continuous Marcela MATY Dostal, DO 75 mL/hr (01/31/21 1234)    Diclofenac Sodium  4 g Topical 4x Daily Shannan A Sedora, CRNP      enoxaparin  1 mg/kg Subcutaneous Q12H [facility] Shannan A Sedora, CRNP      gabapentin  400 mg Oral TID Shannan A Sedora, CRNP      glycopyrrolate  1 mg Oral TID Shannan A Sedora, CRNP      HYDROmorphone  0 5 mg Intravenous Q3H PRN Shannan A Sedora, CRNP      HYDROmorphone  0 5 mg Intravenous Q3H PRN Shannan A Sedora, CRNP      [START ON 2/1/2021] insulin glargine  40 Units Subcutaneous HS Stefan Clement DO      insulin lispro  4-20 Units Subcutaneous Q6H [facility] Shannan A Sedora, CRJASPER      levalbuterol  1 25 mg Nebulization TID Shannan A Sedora, VERONICA      Lidocaine Viscous HCl  15 mL Swish & Spit 4x Daily PRN Shannan A Sedora, CRNP      lisinopril  10 mg Oral Daily Shannan A Sedora, CRNP      menthol-methyl salicylate   Apply externally 4x Daily PRN VERONICA Daly      methadone  23 mg Per PEG Tube Q8H [facility] Shannan A Sedora, CRNP      multivitamin-minerals  1 tablet Oral Daily Shannan A Sedora, VERONICA      omeprazole (PRILOSEC) suspension 2 mg/mL  20 mg Oral Early Morning Shannan A Sedora, VERONICA      phenol  1 spray Mouth/Throat Q2H PRN Shannan A Sedora, VERONICA      polyethylene glycol  17 g Oral Daily Shannan A Sedora, VERONICA      senna  1 tablet Oral BID Shanann A Sedora, VERONICA      sodium chloride  1 spray Each Nare Q1H PRN Fidel Fields DO      sodium chloride  3 mL Nebulization TID VERONICA Daly          Today, Patient Was Seen By: Fidel Fields DO    ** Please Note: Dictation voice to text software may have been used in the creation of this document   **

## 2021-01-31 NOTE — ASSESSMENT & PLAN NOTE
· Secondary to sepsis  · Finished hydrocortisone taper 1/30  · 48 hour stim test 2/2  · Endo consult

## 2021-01-31 NOTE — QUICK NOTE
RN notified me about BG of 47  Ordered 1 amp dextrose  Follow up GB  Cont TF     F/u AM cortisol today and hold solucortef for next 48hrs to perform stim test

## 2021-01-31 NOTE — PROGRESS NOTES
Yudith Champagne met with patient provided Sacrament of the Sick/Anointing       01/31/21 1300   Clinical Encounter Type   Visited With Patient   Routine Visit Follow-up   Latter-day Encounters   Latter-day Needs Prayer   Sacramental Encounters   Sacrament of Sick-Anointing Anointed

## 2021-01-31 NOTE — SPEECH THERAPY NOTE
Speech Language/Pathology    Speech/Language Pathology Progress Note    Patient Name: Renetta GREENO Date: 1/31/2021     Subjective:  "Can you find the channel for the super bowl for me"  Pt fully awake, alert, upright in bed  Holds head up well today  Voice remains dysphonic w/ some periods of breathiness  Current Diet: npo, tube feeds running  Objective:  Pt's BS persistently low, seen for possible po to assist w/ this  O2 began at 94 %  Pt trialed w/ puree, ht, nt by tsp/cup  Pt w/ adequate retrieval from tsp, cup  Noted no overt anterior loss w/ the material today  Mild lingual pumping to transfer w/ fairly prompt swallows  Hyolaryngeal elevation remains reduced w/ all trials noted  He does use an independent mult swallow w/ each tsp or sip  No overt coughing or wet voicing w/ tsps ht, puree  Pt attempted sips nt by straw/cup- noted mild clearing & one coughing event  Able to clear w/ time  O2 fell to only 93% during this time  Assessment:  Pt w/ ongoing improvement in swallow function & his mentation  Still w weak hyolaryngeal rise but no overt immed coughing or wet voicing w/ puree/ht, no drop in O2     Plan/Recommendations:  Appropriate for pleasure feeds puree, ht w/ nsg only  Will follow w/ plans for VBS when no longer covid +

## 2021-01-31 NOTE — RESPIRATORY THERAPY NOTE
resp care      01/31/21 0804   Respiratory Assessment   Resp Comments patient resting on 45lpm humidified nc satting 98%, will d/c midflow o2 at this time and reorder if needed at a later time   Additional Assessments   SpO2 98 %

## 2021-01-31 NOTE — ASSESSMENT & PLAN NOTE
Lab Results   Component Value Date    HGBA1C 11 6 (H) 12/09/2020       Recent Labs     01/31/21  0615 01/31/21  0621 01/31/21  0650 01/31/21  1206   POCGLU 46* 42* 132 54*       Blood Sugar Average: Last 72 hrs:  (P) 301 [id number]     Finished hydrocortisone taper  Lantus 40 Units SQ HS and SSI with accuchecks  Goal 140-180  Note pt got Lantus 40U this AM despite low BS so will hold not give any Lantus until tomorrow night  Endo consult

## 2021-01-31 NOTE — PROGRESS NOTES
Pt bs @ 1200 accucheck = 54  Yudy Loyd of [facility] notified, pt ordered D5 @ 75/hr  Primary team notified (SLIM) after transfer orders were released to accept pt  Dr Christoph Whitaker came to evaluate patient  Endocrine consulted, modifications to pts insulin regiment made  Pt bs checked roughly an hour later, bs noted to be 45 at this time  Pt was given 1 amp of dextrose IV  Pt bs checked again about an hour later, bs had improved to 102  Pt bs checked after another hour, bs was noted to be 63  SLIM made aware, changed fluids to D10 @ 50/hr  Speech on floor at this time, recommending a possible oral pureed diet in addition to tube feeds  Spoke with SLIM, decided to wait until tomorrow for another swallow eval  Pt ultimately needs VBS, however cannot complete as pt is covid positive  Will continue to monitor pts bs and treat accordingly

## 2021-02-01 LAB
ANION GAP SERPL CALCULATED.3IONS-SCNC: 4 MMOL/L (ref 4–13)
BUN SERPL-MCNC: 16 MG/DL (ref 5–25)
CALCIUM SERPL-MCNC: 7.7 MG/DL (ref 8.3–10.1)
CHLORIDE SERPL-SCNC: 98 MMOL/L (ref 100–108)
CO2 SERPL-SCNC: 33 MMOL/L (ref 21–32)
CREAT SERPL-MCNC: 0.48 MG/DL (ref 0.6–1.3)
ERYTHROCYTE [DISTWIDTH] IN BLOOD BY AUTOMATED COUNT: 17.2 % (ref 11.6–15.1)
GFR SERPL CREATININE-BSD FRML MDRD: 118 ML/MIN/1.73SQ M
GLUCOSE SERPL-MCNC: 106 MG/DL (ref 65–140)
GLUCOSE SERPL-MCNC: 115 MG/DL (ref 65–140)
GLUCOSE SERPL-MCNC: 150 MG/DL (ref 65–140)
GLUCOSE SERPL-MCNC: 167 MG/DL (ref 65–140)
GLUCOSE SERPL-MCNC: 170 MG/DL (ref 65–140)
GLUCOSE SERPL-MCNC: 170 MG/DL (ref 65–140)
GLUCOSE SERPL-MCNC: 179 MG/DL (ref 65–140)
GLUCOSE SERPL-MCNC: 195 MG/DL (ref 65–140)
GLUCOSE SERPL-MCNC: 197 MG/DL (ref 65–140)
GLUCOSE SERPL-MCNC: 204 MG/DL (ref 65–140)
GLUCOSE SERPL-MCNC: 219 MG/DL (ref 65–140)
GLUCOSE SERPL-MCNC: 256 MG/DL (ref 65–140)
HCT VFR BLD AUTO: 31.5 % (ref 36.5–49.3)
HGB BLD-MCNC: 9.8 G/DL (ref 12–17)
MAGNESIUM SERPL-MCNC: 1.8 MG/DL (ref 1.6–2.6)
MCH RBC QN AUTO: 29.8 PG (ref 26.8–34.3)
MCHC RBC AUTO-ENTMCNC: 31.1 G/DL (ref 31.4–37.4)
MCV RBC AUTO: 96 FL (ref 82–98)
PHOSPHATE SERPL-MCNC: 3.4 MG/DL (ref 2.3–4.1)
PLATELET # BLD AUTO: 136 THOUSANDS/UL (ref 149–390)
PMV BLD AUTO: 10.3 FL (ref 8.9–12.7)
POTASSIUM SERPL-SCNC: 3.9 MMOL/L (ref 3.5–5.3)
RBC # BLD AUTO: 3.29 MILLION/UL (ref 3.88–5.62)
SODIUM SERPL-SCNC: 135 MMOL/L (ref 136–145)
WBC # BLD AUTO: 9.27 THOUSAND/UL (ref 4.31–10.16)

## 2021-02-01 PROCEDURE — 99223 1ST HOSP IP/OBS HIGH 75: CPT | Performed by: INTERNAL MEDICINE

## 2021-02-01 PROCEDURE — 94640 AIRWAY INHALATION TREATMENT: CPT

## 2021-02-01 PROCEDURE — 97110 THERAPEUTIC EXERCISES: CPT

## 2021-02-01 PROCEDURE — 80048 BASIC METABOLIC PNL TOTAL CA: CPT | Performed by: GENERAL PRACTICE

## 2021-02-01 PROCEDURE — 94760 N-INVAS EAR/PLS OXIMETRY 1: CPT

## 2021-02-01 PROCEDURE — 84100 ASSAY OF PHOSPHORUS: CPT | Performed by: GENERAL PRACTICE

## 2021-02-01 PROCEDURE — 82948 REAGENT STRIP/BLOOD GLUCOSE: CPT

## 2021-02-01 PROCEDURE — 97530 THERAPEUTIC ACTIVITIES: CPT

## 2021-02-01 PROCEDURE — 99024 POSTOP FOLLOW-UP VISIT: CPT | Performed by: SURGERY

## 2021-02-01 PROCEDURE — 83735 ASSAY OF MAGNESIUM: CPT | Performed by: GENERAL PRACTICE

## 2021-02-01 PROCEDURE — 99232 SBSQ HOSP IP/OBS MODERATE 35: CPT | Performed by: INTERNAL MEDICINE

## 2021-02-01 PROCEDURE — 85027 COMPLETE CBC AUTOMATED: CPT | Performed by: GENERAL PRACTICE

## 2021-02-01 RX ORDER — FUROSEMIDE 10 MG/ML
40 INJECTION INTRAMUSCULAR; INTRAVENOUS ONCE
Status: COMPLETED | OUTPATIENT
Start: 2021-02-01 | End: 2021-02-01

## 2021-02-01 RX ORDER — MICONAZOLE NITRATE 20 MG/G
1 CREAM TOPICAL 3 TIMES DAILY
Status: DISCONTINUED | OUTPATIENT
Start: 2021-02-01 | End: 2021-02-25 | Stop reason: HOSPADM

## 2021-02-01 RX ADMIN — MENTHOL, METHYL SALICYLATE: 10; 15 CREAM TOPICAL at 08:10

## 2021-02-01 RX ADMIN — HYDROMORPHONE HYDROCHLORIDE 0.5 MG: 1 INJECTION, SOLUTION INTRAMUSCULAR; INTRAVENOUS; SUBCUTANEOUS at 22:50

## 2021-02-01 RX ADMIN — DICLOFENAC SODIUM 4 G: 10 GEL TOPICAL at 11:48

## 2021-02-01 RX ADMIN — Medication 1 SPRAY: at 20:25

## 2021-02-01 RX ADMIN — MICONAZOLE NITRATE 1 APPLICATION: 20 CREAM TOPICAL at 21:52

## 2021-02-01 RX ADMIN — FUROSEMIDE 40 MG: 10 INJECTION, SOLUTION INTRAMUSCULAR; INTRAVENOUS at 17:59

## 2021-02-01 RX ADMIN — INSULIN LISPRO 2 UNITS: 100 INJECTION, SOLUTION INTRAVENOUS; SUBCUTANEOUS at 11:48

## 2021-02-01 RX ADMIN — Medication 20 MG: at 06:10

## 2021-02-01 RX ADMIN — ISODIUM CHLORIDE 3 ML: 0.03 SOLUTION RESPIRATORY (INHALATION) at 13:32

## 2021-02-01 RX ADMIN — GLYCOPYRROLATE 1 MG: 1 TABLET ORAL at 08:09

## 2021-02-01 RX ADMIN — SENNOSIDES 8.6 MG: 8.6 TABLET, FILM COATED ORAL at 17:27

## 2021-02-01 RX ADMIN — Medication 1 TABLET: at 08:08

## 2021-02-01 RX ADMIN — GABAPENTIN 400 MG: 400 CAPSULE ORAL at 15:34

## 2021-02-01 RX ADMIN — GLYCOPYRROLATE 1 MG: 1 TABLET ORAL at 21:52

## 2021-02-01 RX ADMIN — GLYCOPYRROLATE 1 MG: 1 TABLET ORAL at 15:39

## 2021-02-01 RX ADMIN — POLYETHYLENE GLYCOL 3350 17 G: 17 POWDER, FOR SOLUTION ORAL at 08:07

## 2021-02-01 RX ADMIN — ACETAMINOPHEN 650 MG: 325 TABLET, FILM COATED ORAL at 14:42

## 2021-02-01 RX ADMIN — LEVALBUTEROL HYDROCHLORIDE 1.25 MG: 1.25 SOLUTION, CONCENTRATE RESPIRATORY (INHALATION) at 13:32

## 2021-02-01 RX ADMIN — SENNOSIDES 8.6 MG: 8.6 TABLET, FILM COATED ORAL at 08:08

## 2021-02-01 RX ADMIN — DEXTROSE MONOHYDRATE 100 ML/HR: 100 INJECTION, SOLUTION INTRAVENOUS at 02:26

## 2021-02-01 RX ADMIN — INSULIN LISPRO 1 UNITS: 100 INJECTION, SOLUTION INTRAVENOUS; SUBCUTANEOUS at 03:57

## 2021-02-01 RX ADMIN — LISINOPRIL 10 MG: 10 TABLET ORAL at 08:08

## 2021-02-01 RX ADMIN — LEVALBUTEROL HYDROCHLORIDE 1.25 MG: 1.25 SOLUTION, CONCENTRATE RESPIRATORY (INHALATION) at 19:08

## 2021-02-01 RX ADMIN — INSULIN LISPRO 2 UNITS: 100 INJECTION, SOLUTION INTRAVENOUS; SUBCUTANEOUS at 17:32

## 2021-02-01 RX ADMIN — DICLOFENAC SODIUM 4 G: 10 GEL TOPICAL at 09:21

## 2021-02-01 RX ADMIN — DICLOFENAC SODIUM 4 G: 10 GEL TOPICAL at 21:52

## 2021-02-01 RX ADMIN — METHADONE HYDROCHLORIDE 23 MG: 10 CONCENTRATE ORAL at 21:52

## 2021-02-01 RX ADMIN — METHADONE HYDROCHLORIDE 23 MG: 10 CONCENTRATE ORAL at 05:28

## 2021-02-01 RX ADMIN — METHADONE HYDROCHLORIDE 23 MG: 10 CONCENTRATE ORAL at 00:29

## 2021-02-01 RX ADMIN — ISODIUM CHLORIDE 3 ML: 0.03 SOLUTION RESPIRATORY (INHALATION) at 19:08

## 2021-02-01 RX ADMIN — ENOXAPARIN SODIUM 80 MG: 80 INJECTION SUBCUTANEOUS at 11:48

## 2021-02-01 RX ADMIN — METHADONE HYDROCHLORIDE 23 MG: 10 CONCENTRATE ORAL at 15:34

## 2021-02-01 RX ADMIN — INSULIN HUMAN 10 UNITS: 100 INJECTION, SOLUTION PARENTERAL at 17:31

## 2021-02-01 RX ADMIN — GABAPENTIN 400 MG: 400 CAPSULE ORAL at 08:08

## 2021-02-01 RX ADMIN — DICLOFENAC SODIUM 4 G: 10 GEL TOPICAL at 17:24

## 2021-02-01 RX ADMIN — ISODIUM CHLORIDE 3 ML: 0.03 SOLUTION RESPIRATORY (INHALATION) at 08:01

## 2021-02-01 RX ADMIN — Medication 2000 UNITS: at 08:08

## 2021-02-01 RX ADMIN — LEVALBUTEROL HYDROCHLORIDE 1.25 MG: 1.25 SOLUTION, CONCENTRATE RESPIRATORY (INHALATION) at 08:01

## 2021-02-01 RX ADMIN — GABAPENTIN 400 MG: 400 CAPSULE ORAL at 21:52

## 2021-02-01 NOTE — ASSESSMENT & PLAN NOTE
· S/p extubation 1/25  · Speech following  · NPO-continue NGT for TF  · Consider ENT consult this symptoms persist   Video barium swallow for swell dysfunction  · Chloraseptic spray  · glycopyrolate

## 2021-02-01 NOTE — NURSING NOTE
Received Fr 16 NGT from store room upon arrival to [facility] 2 after requested by RN to attempt to replace after dislodging earlier in the evening  Pt placed in proper position with chin to chest and NGT placed in R nare  Auscultation with positive gastric sounds noted with both L  Thersa Keys and KENDELL Allen Argelia at bedside  Minimal residual noted upon aspiration attempt  Primary RN notified  Tube feed restarted

## 2021-02-01 NOTE — ASSESSMENT & PLAN NOTE
· Completed cefepime and Vancomycin  · BC neg  · Trend WBC and fever curve  · Patient tested positive for COVID on December 31st, 2020    Okay to come off of isolation

## 2021-02-01 NOTE — PHYSICAL THERAPY NOTE
Physical Therapy Treatment note     Patient Name: Dustin Bruce    SGDPJ'M Date: 2/1/2021     Problem List  Principal Problem:    Pneumonia due to COVID-19 virus  Active Problems:    HTN (hypertension)    DM2 (diabetes mellitus, type 2) ([place] [address] )    Acute metabolic encephalopathy    Acute respiratory failure with hypoxia ([facility] )    Sepsis due to COVID-19 [facility])    Aortic thrombus (HCC)    Anemia    Hypokalemia    Myopathy    Adrenal insufficiency (HCC)    Vocal cord dysfunction       Past Medical History  Past Medical History:   Diagnosis Date    Diabetes mellitus ([place] [address] )     GERD (gastroesophageal reflux disease)     Hypercholesteremia     Hypertension     Methadone use ([place] [address] )         Past Surgical History  Past Surgical History:   Procedure Laterality Date    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    Procedure: AMPUTATION ABOVE KNEE (AKA); Surgeon: Timoteo Morales MD;  [facility];  Service: Vascular    AMPUTATION ABOVE KNEE (AKA) Right 1/18/2021    Procedure: AMPUTATION ABOVE KNEE (AKA) FORMALIZATION,  R AKA wound washout, wound closure;  Surgeon: Timoteo Morales MD;  [facility];  Service: Vascular    ARTERIOGRAM Right 1/13/2021    Procedure: ARTERIOGRAM;  Surgeon: Whitney Meyer DO;  [facility];  Service: Vascular    IR LOWER EXTREMITY ANGIOGRAM  1/14/2021    THROMBECTOMY W/ EMBOLECTOMY Right 1/13/2021    Procedure: EMBOLECTOMY/THROMBECTOMY LOWER EXTREMITY;  Surgeon: Whitney Meyer DO;  [facility];  Service: Vascular        02/01/21 1134   PT Last Visit   PT Visit Date 02/01/21   Note Type   Note Type Treatment   Pain Assessment   Pain Assessment Tool Pain Assessment not indicated - pt denies pain   Pain Score No Pain   Restrictions/Precautions   Weight Bearing Precautions Per Order Yes   RLE Weight Bearing Per Order NWB  (recent AKA)   Other Precautions Cognitive; Chair Alarm; Bed Alarm;Multiple lines; Fall Risk;O2  (NGT)   General   Chart Reviewed Yes   Family/Caregiver Present No   Cognition   Overall Cognitive Status Impaired   Arousal/Participation Alert   Attention Attends with cues to redirect   Bed Mobility   Supine to Sit 3  Moderate assistance   Additional items Assist x 2   Sit to Supine 3  Moderate assistance   Additional items Assist x 2   Additional Comments sitting EOB at a mod-max A    Transfers   Sit to Stand 1  Dependent   Additional items Assist x 2   Stand to Sit 1  Dependent   Additional items Assist x 2   Additional Comments unable to stand fully upright    Balance   Static Sitting Poor -   Dynamic Sitting Poor -   Endurance Deficit   Endurance Deficit Yes   Activity Tolerance   Activity Tolerance Patient limited by fatigue   Medical Staff Made Aware OT   Nurse Made Aware nurse approved therapy session   Exercises   Hip Flexion Sitting;Left  (AAROM and cueing to complete activity )   Knee AROM Long Arc Quad Sitting;Left  (AAROM and  cues to complete activity )   Balance training  sitting EOB for 15-18 minutes at a mod A-max A with reachign activities    Assessment   Prognosis Fair   Problem List Decreased strength;Decreased endurance; Impaired balance;Decreased mobility; Decreased cognition;Decreased safety awareness;Orthopedic restrictions   Assessment Pt presents to therapy today with reduced mobility, high risk of falling, poor activity tolerance, poor direction following, poor sitting balance and tolerance, reduced R LE strength, reduced B UE strength  These impairments limit the patient by requiring increased assistance for mobility and places him at risk of falling  Pt would benefit from continued skilled therapy while in the hospital to improve overall mobility and work towards a safe d/c  Recommend rehab  At end of session patient was left supine with call bell within reach   The patient's AM-PAC Basic Mobility Inpatient Short Form Low Function Raw Score 14 , Standardized Score is 22 01  A standardized score less 42 9 suggests the patient may benefit from discharge to post-acute rehab services  Please also refer to the recommendation of the Physical Therapist for safe discharge planning  Barriers to Discharge Inaccessible home environment;Decreased caregiver support   Goals   STG Expiration Date 02/10/21   PT Treatment Day 2   Plan   Treatment/Interventions Functional transfer training;LE strengthening/ROM; Therapeutic exercise; Endurance training;Bed mobility   Progress Slow progress, decreased activity tolerance   PT Frequency 2-3x/wk   Recommendation   PT Discharge Recommendation Post-Acute Rehabilitation Services   PT - OK to Discharge Yes   Additional Comments if to rehab   Jayne 8 in Bed Without Bedrails 2   Lying on Back to Sitting on Edge of Flat Bed 2   Moving Bed to Chair 1   Standing Up From Chair 1   Walk in Room 1   Climb 3-5 Stairs 1   Basic Mobility Inpatient Raw Score 8   Turning Head Towards Sound 3   Follow Simple Instructions 3   Low Function Basic Mobility Raw Score 14   Low Function Basic Mobility Standardized Score 22 01   Otho Goodell, Pt, DPT

## 2021-02-01 NOTE — CONSULTS
Consultation - [facility]    Patient Information: Rosa Thakur 56 y o  male MRN: [id number]  Unit/Bed#: -01 Encounter: [id number]  Admitting Physician: Miller Keane MD  PCP: VERONICA Corea  Date of Consultation:  02/01/21    ASSESSMENT:  56 yr male with Type 2 diabetes, vasculopathy, HTN, HLD had a recent prolonged ICU stay requiring stress dose steroids and now has iatrogenic hypoglycemia  PLAN:    1  Type 2 diabetes  He is uncontrolled with recent A1c 11 6%  He has known insulin resistance requiring nearly 200u insulin/day  He was on [place] and  as outpt  Presently, He seems to have hypoglycemia on Lantus 40u BID in context of TF diet  Plan:  Switch to  insulin 10u Q6hr [vimal correctional insulin with TF diet  Monitor fingersticks and titrate upwards  Will need close outpt follow up with endocrinology  Follows with Dr Jarrett Cintron  2  Adrenal insufficiency  Will check AM cortisol tomorrow  If this is normal, patient probably does not have adrenal insufficiency  May consider an ACTH stimulation test([facility] service requested it for tomorrow)  Total time spent was 50min of which >50% was in coordination of care  Reason for consultation:   Adrenal insufficiency and Type 2 diabetes    History of Present Illness:    Rosa Thakur is a 56 y o  male with Type 2 diabetes, HTN, Dislipidemia, insulin resistance, recent COVID 19 pneumonia was initially admitted to  with DKA and transferred to [address] [place] [address]  He developed respiratory failure with multiorgan dysfunction requiring ICU stay  He then developed acute Rt Limb ischemia suspected aortic thrombus and transferred to [facility]  He is post Rt AKA and currently on tube feeding diet via NGT 2" vocal cord dysfunction  During his ICU stay, there was use of stress dose IV hydrocortisone and Dexamethasone  Last dose was 1/30/21 at 930pm  AM cortisol on 1/31/21 at 645am was 12 4ug/mL       Review of Systems:    Review of Systems   Reason unable to perform ROS: unable to do a full ROS  Past Medical and Surgical History:     Past Medical History:   Diagnosis Date    Diabetes mellitus ([facility] )     GERD (gastroesophageal reflux disease)     Hypercholesteremia     Hypertension     Methadone use ([facility] )        Past Surgical History:   Procedure Laterality Date    AMPUTATION ABOVE KNEE (AKA) Right 2021    Procedure: AMPUTATION ABOVE KNEE (AKA); Surgeon: Timoteo Morales MD;  [facility];  Service: Vascular    AMPUTATION ABOVE KNEE (AKA) Right 2021    Procedure: AMPUTATION ABOVE KNEE (AKA) FORMALIZATION,  R AKA wound washout, wound closure;  Surgeon: Timoteo Morales MD;  [facility];  Service: Vascular    ARTERIOGRAM Right 2021    Procedure: ARTERIOGRAM;  Surgeon: Whitney Meyer DO;  [facility];  Service: Vascular    IR LOWER EXTREMITY ANGIOGRAM  2021    THROMBECTOMY W/ EMBOLECTOMY Right 2021    Procedure: EMBOLECTOMY/THROMBECTOMY LOWER EXTREMITY;  Surgeon: Whitney Meyer DO;  [facility];  Service: Vascular       Meds/Allergies:    PTA meds:   Prior to Admission Medications   Prescriptions Last Dose Informant Patient Reported? Taking?    Blood Glucose Monitoring Suppl Linda Guzmán) w/Device KIT  Self No No   Sig: Use to test blood sugars 3 times daily   Empagliflozin (Jardiance) 25 MG TABS   No No   Sig: Take 1 tablet (25 mg total) by mouth every morning   Insulin Pen Needle 31G X 5 MM MISC  Self No No   Sig: by Does not apply route daily Pt to inject 4 X daily   Insulin Pen Needle 31G X 5 MM MISC  Self No No   Si units sq 2X daily   ergocalciferol (VITAMIN D2) 50,000 units   No No   Sig: Take 1 capsule (50,000 Units total) by mouth once a week   erythromycin (ILOTYCIN) ophthalmic ointment  Self Yes No   Si 5 inches daily at bedtime   furosemide (LASIX) 40 mg tablet  Self No No   Sig: Take 1 tablet (40 mg total) by mouth daily Patient taking differently: Take 40 mg by mouth as needed (on off work days)    gabapentin (NEURONTIN) 100 mg capsule   No No   Sig: Take 1 capsule (100 mg total) by mouth 3 (three) times a day   glucose blood (OneTouch Verio) test strip   No No   Sig: Use to test blood sugars 3 times daily   insulin NPH-insulin regular (NovoLIN 70/30) 100 units/mL subcutaneous injection   No No   Sig: Inject 40 Units under the skin 2 (two) times a day before meals   Patient taking differently: Inject 40 Units under the skin 2 (two) times a day before meals Pt states he takes it "once in awhile"   insulin glargine (LANTUS) 100 units/mL subcutaneous injection   No No   Sig: Inject 10 Units under the skin daily at bedtime   Patient taking differently: Inject 20 Units under the skin daily at bedtime    isosorbide mononitrate (IMDUR) 120 mg 24 hr tablet   No No   Sig: Take 1 tablet (120 mg total) by mouth daily   lisinopril-hydrochlorothiazide (PRINZIDE,ZESTORETIC) 20-25 MG per tablet   No No   Sig: Take 1 tablet by mouth daily   methadone (DOLOPHINE) 10 MG/5ML solution  Self Yes No   Sig: Take 70 mg by mouth   omeprazole (PriLOSEC) 20 mg delayed release capsule   No No   Sig: Take 1 capsule (20 mg total) by mouth daily   ondansetron (ZOFRAN-ODT) 4 mg disintegrating tablet   No No   Sig: Take 1 tablet (4 mg total) by mouth every 6 (six) hours as needed for nausea or vomiting   potassium chloride (K-DUR,KLOR-CON) 20 mEq tablet  Self No No   Sig: Take 2 tablets (40 mEq total) by mouth daily   pravastatin (PRAVACHOL) 40 mg tablet   No No   Sig: Take 1 tablet (40 mg total) by mouth daily   promethazine (PHENERGAN) 25 mg tablet   No No   Sig: TAKE 1 TABLET TWICE A DAY   sitaGLIPtin-metFORMIN (JANUMET)  MG per tablet   No No   Sig: Take 1 tablet by mouth 2 (two) times a day with meals      Facility-Administered Medications: None       Allergies:    Allergies   Allergen Reactions    Varenicline      History:     Marital Status:    Occupation:   Substance Use History:   Social History     Substance and Sexual Activity   Alcohol Use No     Social History     Tobacco Use   Smoking Status Former Smoker   Smokeless Tobacco Never Used     Social History     Substance and Sexual Activity   Drug Use No    Comment: methadone       Family History:    Family History   Problem Relation Age of Onset    Diabetes Mother     Hypertension Father     Leukemia Father     Acute lymphoblastic leukemia Father     Cancer Sister        Physical Exam:     Vitals:   Blood Pressure: 144/77 (02/01/21 0724)  Pulse: 74 (02/01/21 0750)  Temperature: 98 9 °F (37 2 °C) (02/01/21 0724)  Temp Source: Oral (01/31/21 4367)  Respirations: 20 (02/01/21 0724)  Height: 5' 6" (167 6 cm) (01/15/21 1442)  Weight - Scale: 80 9 kg (178 lb 6 4 oz) (01/31/21 0600)  SpO2: 93 % (02/01/21 0803)    Physical Exam      Lab and Imaging Results: I have personally reviewed pertinent films in PACS    Results from last 7 days   Lab Units 02/01/21  0542  01/28/21  0600   WBC Thousand/uL 9 27   < > 8 83   HEMOGLOBIN g/dL 9 8*   < > 8 4*   HEMATOCRIT % 31 5*   < > 28 8*   PLATELETS Thousands/uL 136*   < > 172   NEUTROS PCT %  --   --  85*   LYMPHS PCT %  --   --  10*   MONOS PCT %  --   --  4   EOS PCT %  --   --  0    < > = values in this interval not displayed  Results from last 7 days   Lab Units 02/01/21  0542   POTASSIUM mmol/L 3 9   CHLORIDE mmol/L 98*   CO2 mmol/L 33*   BUN mg/dL 16   CREATININE mg/dL 0 48*   CALCIUM mg/dL 7 7*         POC Glucose (mg/dl)   Date Value   02/01/2021 219 (H)   02/01/2021 170 (H)   02/01/2021 170 (H)   02/01/2021 256 (H)   02/01/2021 167 (H)   02/01/2021 150 (H)   02/01/2021 115   01/31/2021 106   01/31/2021 105   01/31/2021 82         ** Please Note: Dragon 360 Dictation voice to text software may have been used in the creation of this document   **

## 2021-02-01 NOTE — OCCUPATIONAL THERAPY NOTE
OccupationalTherapy Progress Note     Patient Name: Pauly NICHOLSON Date: 2/1/2021  Problem List  Principal Problem:    Pneumonia due to COVID-19 virus  Active Problems:    HTN (hypertension)    DM2 (diabetes mellitus, type 2) ([facility] )    Acute metabolic encephalopathy    Acute respiratory failure with hypoxia ([facility] )    Sepsis due to COVID-19 [facility])    Aortic thrombus (HCC)    Anemia    Hypokalemia    Myopathy    Adrenal insufficiency (HCC)    Vocal cord dysfunction       02/01/21 1325   OT Last Visit   OT Visit Date 02/01/21   Note Type   Note Type Treatment   Restrictions/Precautions   RLE Weight Bearing Per Order NWB  (recent AKA)   Other Precautions Cognitive; Chair Alarm; Bed Alarm;Multiple lines;Telemetry;O2;Pain; Fall Risk   Lifestyle   Autonomy I ADLS/IADLS, transfers and functional mobility PTA   Reciprocal Relationships Pt lives w/ his siblings and nephews   Service to Others Pt works full time   Intrinsic Gratification Unable to report @ this time   Pain Assessment   Pain Assessment Tool 0-10   Pain Score No Pain   ADL   Grooming Assistance 2  Maximal Assistance   Grooming Comments Wipe face   LB Dressing Assistance 1  Total Assistance   LB Dressing Deficit Don/doff L sock   Bed Mobility   Supine to Sit 3  Moderate assistance   Additional items Assist x 2; Increased time required;Verbal cues;LE management   Sit to Supine 3  Moderate assistance   Additional items Assist x 2; Increased time required;Verbal cues;LE management   Additional Comments Requires min-modA to maintain upright sitting balance  Requires cues to hold head in neutral position (chin down), but only able to maintain ~5 seconds before fatiguing  Pt with poor trunk control in general, no lateral lean preference noted    Pt left supine in bed with all needs within reach, bed alarm activated and b/l UEs elevated on pillows to promote decreased fluid accumulation (currently 3+ pitting edema b/l)   Transfers   Sit to Stand 1  Dependent Additional items Assist x 2; Increased time required;Verbal cues   Stand to Sit 1  Dependent   Additional items Assist x 2; Increased time required;Verbal cues   Additional Comments Only able to clear buttocks ~2 inches with totalAx2  ROM- Right Upper Extremities   R Elbow AAROM   R Hand AAROM   R Position Seated   R Weight/Reps/Sets 10x2   RUE ROM Comment decreased , limited shoulder AROM  Dominant R UE weaker than L UE, but both 2/5 grossly   ROM - Left Upper Extremities    L Elbow AAROM   L Hand AAROM   L Position Seated   L Weight/Reps/Sets 10x2   LUE ROM Comment decreased , limited shoulder AROM  Dominant R UE weaker than L UE, but both 2/5 grossly  Likely would benefit from use of builtup handles for feeding and grooming tasks   Activity Tolerance   Activity Tolerance Patient limited by fatigue   Medical Staff Made Aware PT   Assessment   Assessment Pt is seen for OT treatment session 2/1/21 including interventions to: increase endurance, improve dynamic sitting balance, increase use of b/l UEs, and improve activity tolerance  In comparison to previous session, pt with improved bed mobility, sitting balance and endurance  Pt continues to present with significantly weak b/l UEs, poor trunk/neck control, and poor activity tolerance  Pt continues to function below his functional baseline, and is to continue to benefit from skilled occupational therapy services while in the hospital to maximize functioning and independence with daily activities  OT to continue to recommend STR upon d/c      Plan   Treatment Interventions ADL retraining;Functional transfer training;UE strengthening/ROM; Endurance training;Cognitive reorientation;Patient/family training;Equipment evaluation/education; Compensatory technique education;Continued evaluation; Activityengagement; Fine motor coordination activities   Goal Expiration Date 02/10/21   OT Treatment Day 1   OT Frequency 2-3x/wk   Recommendation   OT Discharge Recommendation Post-Acute Rehabilitation Services   OT - OK to Discharge Yes   AM-PAC Daily Activity Inpatient   Lower Body Dressing 1   Bathing 1   Toileting 1   Upper Body Dressing 1   Grooming 2   Eating 1   Daily Activity Raw Score 7   Turning Head Towards Sound 3   Follow Simple Instructions 2   Low Function Daily Activity Raw Score 12   Low Function Daily Activity Standardized Score 21 38   AM-PAC Applied Cognition Inpatient   Following a Speech/Presentation 2   Understanding Ordinary Conversation 3   Taking Medications 1   Remembering Where Things Are Placed or Put Away 1   Remembering List of 4-5 Errands 1   Taking Care of Complicated Tasks 1   Applied Cognition Raw Score 9   Applied Cognition Standardized Score 22 48     The patient's raw score on the AM-PAC Daily Activity inpatient short form low function score is 16, standardized score is Low Function Daily Activity Standardized Score: (P) 21 38  Patients with a standardized score less than 39 4 are likely to benefit from DC to post-acute rehab services  Please refer to the recommendation of the Occupational Therapist for safe DC planning  Goals to be added at this time:  Pt will complete functional transfers with Nora with use of DME as appropriate and with good safety/balance      ELDER Pan, OTR/L

## 2021-02-01 NOTE — PROGRESS NOTES
VASCULAR SURGERY FREE TEXT PROGRESS NOTE    Patient seen and examined  Stump check performed  Dressing removed, lateral aspect of wound has approximately 2cm area of skin necrosis, no surrounding erythema/drainage, no fluctuance, no drainage, stump is warm    Continue with daily local wound care

## 2021-02-01 NOTE — PROGRESS NOTES
Progress Note Rita Grain 1960, 56 y o  male MRN: [id number]    Unit/Bed#: -01 Encounter: [id number]    Primary Care Provider: VERONICA River   Date and time admitted to hospital: 1/13/2021  2:28 PM        * Pneumonia due to COVID-19 virus  Assessment & Plan  · Completed cefepime and Vancomycin  · BC neg  · Trend WBC and fever curve  · Patient tested positive for COVID on December 31st, 2020  Okay to come off of isolation    Vocal cord dysfunction  Assessment & Plan  · S/p extubation 1/25  · Speech following  · NPO-continue NGT for TF  · Consider ENT consult this symptoms persist   Video barium swallow for swell dysfunction  · Chloraseptic spray  · glycopyrolate    Adrenal insufficiency (HCC)  Assessment & Plan  · Secondary to sepsis  · Finished hydrocortisone taper 1/30  · Endocrine consult noted  Cortisol level in a m       Myopathy  Assessment & Plan  · Secondary to critical illness and steroid use  · PT/OT    Anemia  Assessment & Plan  · Currently 9 2, secondary to critical illness  · Continue to trend    Aortic thrombus (HCC)  Assessment & Plan  · 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  · Continue lovenox  ·     Sepsis due to COVID-19 [facility])  Assessment & Plan  · BC x 2 NGTD  · Completed Vanco and Cefepime  · Trend WBC and fever curve    Acute respiratory failure with hypoxia (HCC)  Assessment & Plan  · Secondary to COVID 19  · Extubated 1/25  · Currently on 5L  · Now on 2L  · Pulmonary toiletting    DM2 (diabetes mellitus, type 2) (HCC)  Assessment & Plan  Lab Results   Component Value Date    HGBA1C 11 6 (H) 12/09/2020       Recent Labs     02/01/21  0354 02/01/21  0546 02/01/21  0547 02/01/21  0757   POCGLU 167* 256* 170* 170*       Blood Sugar Average: Last 72 hrs:  (P) 157 [id number]     Finished hydrocortisone taper  Lantus 40 Units SQ HS and SSI with accuchecks  Goal 140-180  Restart Lantus tonight  Endo consult      BMI 28 0-28 9,adult  Assessment & Plan  Patient presenting with malnutrition  Currently on NG tube feeds  Will ask video barium swallow  HTN (hypertension)  Assessment & Plan  · Continue lisinopril      VTE Pharmacologic Prophylaxis:   Pharmacologic: Enoxaparin (Lovenox)  Mechanical VTE Prophylaxis in Place: No    Patient Centered Rounds: I have performed bedside rounds with nursing staff today  Time Spent for Care: 15 minutes  More than 50% of total time spent on counseling and coordination of care as described above  Current Length of Stay: 19 day(s)    Current Patient Status: Inpatient         Code Status: Level 1 - Full Code      Subjective:   No acute distress    Objective:     Vitals:   Temp (24hrs), Av 8 °F (37 1 °C), Min:98 2 °F (36 8 °C), Max:99 3 °F (37 4 °C)    Temp:  [98 2 °F (36 8 °C)-99 3 °F (37 4 °C)] 98 2 °F (36 8 °C)  HR:  [72-93] 72  Resp:  [18-22] 18  BP: (139-144)/(70-83) 141/77  SpO2:  [92 %-96 %] 94 %  Body mass index is 28 79 kg/m²  Input and Output Summary (last 24 hours): Intake/Output Summary (Last 24 hours) at 2021 1829  Last data filed at 2021 1750  Gross per 24 hour   Intake 3883 ml   Output 2150 ml   Net 1733 ml       Physical Exam:     Physical Exam  Constitutional:       Appearance: Normal appearance  HENT:      Head: Normocephalic  Nose: Nose normal    Neck:      Musculoskeletal: Normal range of motion  Cardiovascular:      Rate and Rhythm: Regular rhythm  Pulses: Normal pulses  Heart sounds: Normal heart sounds  Pulmonary:      Effort: Pulmonary effort is normal  No respiratory distress  Breath sounds: Normal breath sounds  Abdominal:      General: Abdomen is flat  Palpations: Abdomen is soft  Musculoskeletal:         General: Swelling present  No tenderness  Skin:     General: Skin is warm and dry  Neurological:      General: No focal deficit present        Mental Status: He is alert and oriented to person, place, and time  Additional Data:     Labs:    Results from last 7 days   Lab Units 02/01/21  0542  01/28/21  0600   WBC Thousand/uL 9 27   < > 8 83   HEMOGLOBIN g/dL 9 8*   < > 8 4*   HEMATOCRIT % 31 5*   < > 28 8*   PLATELETS Thousands/uL 136*   < > 172   NEUTROS PCT %  --   --  85*   LYMPHS PCT %  --   --  10*   MONOS PCT %  --   --  4   EOS PCT %  --   --  0    < > = values in this interval not displayed  Results from last 7 days   Lab Units 02/01/21  0542   POTASSIUM mmol/L 3 9   CHLORIDE mmol/L 98*   CO2 mmol/L 33*   BUN mg/dL 16   CREATININE mg/dL 0 48*   CALCIUM mg/dL 7 7*           * I Have Reviewed All Lab Data Listed Above  * Additional Pertinent Lab Tests Reviewed:  All Labs Within Last 24 Hours Reviewed        Recent Cultures (last 7 days):           Last 24 Hours Medication List:   Current Facility-Administered Medications   Medication Dose Route Frequency Provider Last Rate    acetaminophen  650 mg Oral Q6H PRN Shannan A Sedora, CRNP      bisacodyl  10 mg Rectal Daily PRN Pattdanyell Lux, CRNP      cholecalciferol  2,000 Units Oral Daily Shannan A Sedora, CRNP      Diclofenac Sodium  4 g Topical 4x Daily Shannan A Sedora, CRNP      enoxaparin  1 mg/kg Subcutaneous Q12H [address] Shannan A Sedora, CRNP      gabapentin  400 mg Oral TID Shannan A Sedora, CRNP      glycopyrrolate  1 mg Oral TID Shannan A Sedora, CRNP      HYDROmorphone  0 5 mg Intravenous Q3H PRN Shannan A Sedora, CRNP      HYDROmorphone  0 5 mg Intravenous Q3H PRN Shannan A Sedora, CRNP      insulin lispro  1-6 Units Subcutaneous Q6H Jennifer Montiel DO      insulin regular  10 Units Subcutaneous 4x Daily Arturo Menjivar MD      levalbuterol  1 25 mg Nebulization TID Shannan A Sedora, CRNP      Lidocaine Viscous HCl  15 mL Swish & Spit 4x Daily PRN Shannan A Sedora, CRNP      lisinopril  10 mg Oral Daily Shannan A Sedora, CRNP      menthol-methyl salicylate   Apply externally 4x Daily PRN Shannan A VERONICA Ryan      methadone  23 mg Per PEG Tube Q8H [address] Shannan A Sandeep, VERONICA      multivitamin-minerals  1 tablet Oral Daily VERONICA Kennedy      omeprazole (PRILOSEC) suspension 2 mg/mL  20 mg Oral Early Morning Shannan A Sandeep, VERONICA      phenol  1 spray Mouth/Throat Q2H PRN VERONICA Kennedy      polyethylene glycol  17 g Oral Daily VERONICA Kennedy      senna  1 tablet Oral BID VERONICA Kennedy      sodium chloride  1 spray Each Nare Q1H PRN Kath Oseguera DO      sodium chloride  3 mL Nebulization TID VERONICA Del Cid          Today, Patient Was Seen By: Michael Hebert DO    ** Please Note: Dictation voice to text software may have been used in the creation of this document   **

## 2021-02-01 NOTE — ASSESSMENT & PLAN NOTE
· Secondary to sepsis  · Finished hydrocortisone taper 1/30  · Endocrine consult noted  Cortisol level in a kendra Golden

## 2021-02-01 NOTE — PLAN OF CARE
Problem: OCCUPATIONAL THERAPY ADULT  Goal: Performs self-care activities at highest level of function for planned discharge setting  See evaluation for individualized goals  Description: Treatment Interventions: ADL retraining, Functional transfer training, UE strengthening/ROM, Endurance training, Cognitive reorientation, Patient/family training, Equipment evaluation/education, Compensatory technique education, Continued evaluation, Activityengagement, Fine motor coordination activities          See flowsheet documentation for full assessment, interventions and recommendations  Note: Limitation: Decreased ADL status, Decreased Safe judgement during ADL, Decreased UE strength, Decreased UE ROM, Decreased cognition, Decreased endurance, Decreased self-care trans, Decreased high-level ADLs, Decreased sensation, Decreased fine motor control, Visual deficit  Prognosis: Fair  Assessment: Pt is seen for OT treatment session 2/1/21 including interventions to: increase endurance, improve dynamic sitting balance, increase use of b/l UEs, and improve activity tolerance  In comparison to previous session, pt with improved bed mobility, sitting balance and endurance  Pt continues to present with significantly weak b/l UEs, poor trunk/neck control, and poor activity tolerance  Pt continues to function below his functional baseline, and is to continue to benefit from skilled occupational therapy services while in the hospital to maximize functioning and independence with daily activities  OT to continue to recommend STR upon d/c        OT Discharge Recommendation: Post-Acute Rehabilitation Services  OT - OK to Discharge:  Yes

## 2021-02-01 NOTE — ASSESSMENT & PLAN NOTE
Lab Results   Component Value Date    HGBA1C 11 6 (H) 12/09/2020       Recent Labs     02/01/21  0354 02/01/21  0546 02/01/21  0547 02/01/21  0757   POCGLU 167* 256* 170* 170*       Blood Sugar Average: Last 72 hrs:  (P) 374 [id number]     Finished hydrocortisone taper  Lantus 40 Units SQ HS and SSI with accuchecks  Goal 140-180  Restart Lantus tonight  Endo consult

## 2021-02-02 ENCOUNTER — APPOINTMENT (INPATIENT)
Dept: RADIOLOGY | Facility: HOSPITAL | Age: 61
DRG: 853 | End: 2021-02-02
Attending: INTERNAL MEDICINE
Payer: COMMERCIAL

## 2021-02-02 LAB
ANION GAP SERPL CALCULATED.3IONS-SCNC: 3 MMOL/L (ref 4–13)
BASOPHILS # BLD AUTO: 0.01 THOUSANDS/ΜL (ref 0–0.1)
BASOPHILS NFR BLD AUTO: 0 % (ref 0–1)
BUN SERPL-MCNC: 16 MG/DL (ref 5–25)
CALCIUM SERPL-MCNC: 8.1 MG/DL (ref 8.3–10.1)
CHLORIDE SERPL-SCNC: 99 MMOL/L (ref 100–108)
CO2 SERPL-SCNC: 32 MMOL/L (ref 21–32)
CORTIS AM PEAK SERPL-MCNC: 17.8 UG/DL (ref 4.2–22.4)
CREAT SERPL-MCNC: 0.44 MG/DL (ref 0.6–1.3)
EOSINOPHIL # BLD AUTO: 0.07 THOUSAND/ΜL (ref 0–0.61)
EOSINOPHIL NFR BLD AUTO: 1 % (ref 0–6)
ERYTHROCYTE [DISTWIDTH] IN BLOOD BY AUTOMATED COUNT: 17.1 % (ref 11.6–15.1)
GFR SERPL CREATININE-BSD FRML MDRD: 122 ML/MIN/1.73SQ M
GLUCOSE SERPL-MCNC: 135 MG/DL (ref 65–140)
GLUCOSE SERPL-MCNC: 169 MG/DL (ref 65–140)
GLUCOSE SERPL-MCNC: 172 MG/DL (ref 65–140)
GLUCOSE SERPL-MCNC: 253 MG/DL (ref 65–140)
GLUCOSE SERPL-MCNC: 95 MG/DL (ref 65–140)
HCT VFR BLD AUTO: 29.3 % (ref 36.5–49.3)
HGB BLD-MCNC: 9.2 G/DL (ref 12–17)
IMM GRANULOCYTES # BLD AUTO: 0.05 THOUSAND/UL (ref 0–0.2)
IMM GRANULOCYTES NFR BLD AUTO: 1 % (ref 0–2)
LYMPHOCYTES # BLD AUTO: 0.56 THOUSANDS/ΜL (ref 0.6–4.47)
LYMPHOCYTES NFR BLD AUTO: 7 % (ref 14–44)
MCH RBC QN AUTO: 29.5 PG (ref 26.8–34.3)
MCHC RBC AUTO-ENTMCNC: 31.4 G/DL (ref 31.4–37.4)
MCV RBC AUTO: 94 FL (ref 82–98)
MONOCYTES # BLD AUTO: 0.54 THOUSAND/ΜL (ref 0.17–1.22)
MONOCYTES NFR BLD AUTO: 6 % (ref 4–12)
NEUTROPHILS # BLD AUTO: 7.21 THOUSANDS/ΜL (ref 1.85–7.62)
NEUTS SEG NFR BLD AUTO: 85 % (ref 43–75)
NRBC BLD AUTO-RTO: 0 /100 WBCS
PLATELET # BLD AUTO: 151 THOUSANDS/UL (ref 149–390)
PMV BLD AUTO: 10.7 FL (ref 8.9–12.7)
POTASSIUM SERPL-SCNC: 3.9 MMOL/L (ref 3.5–5.3)
RBC # BLD AUTO: 3.12 MILLION/UL (ref 3.88–5.62)
SODIUM SERPL-SCNC: 134 MMOL/L (ref 136–145)
WBC # BLD AUTO: 8.44 THOUSAND/UL (ref 4.31–10.16)

## 2021-02-02 PROCEDURE — 85025 COMPLETE CBC W/AUTO DIFF WBC: CPT | Performed by: INTERNAL MEDICINE

## 2021-02-02 PROCEDURE — 92611 MOTION FLUOROSCOPY/SWALLOW: CPT

## 2021-02-02 PROCEDURE — NC001 PR NO CHARGE: Performed by: INTERNAL MEDICINE

## 2021-02-02 PROCEDURE — 82533 TOTAL CORTISOL: CPT | Performed by: INTERNAL MEDICINE

## 2021-02-02 PROCEDURE — 94664 DEMO&/EVAL PT USE INHALER: CPT

## 2021-02-02 PROCEDURE — 99232 SBSQ HOSP IP/OBS MODERATE 35: CPT | Performed by: INTERNAL MEDICINE

## 2021-02-02 PROCEDURE — 94760 N-INVAS EAR/PLS OXIMETRY 1: CPT

## 2021-02-02 PROCEDURE — 99232 SBSQ HOSP IP/OBS MODERATE 35: CPT | Performed by: HOSPITALIST

## 2021-02-02 PROCEDURE — 82948 REAGENT STRIP/BLOOD GLUCOSE: CPT

## 2021-02-02 PROCEDURE — 94640 AIRWAY INHALATION TREATMENT: CPT

## 2021-02-02 PROCEDURE — 80048 BASIC METABOLIC PNL TOTAL CA: CPT | Performed by: INTERNAL MEDICINE

## 2021-02-02 PROCEDURE — 74230 X-RAY XM SWLNG FUNCJ C+: CPT

## 2021-02-02 RX ADMIN — METHADONE HYDROCHLORIDE 23 MG: 10 CONCENTRATE ORAL at 06:35

## 2021-02-02 RX ADMIN — HYDROMORPHONE HYDROCHLORIDE 0.5 MG: 1 INJECTION, SOLUTION INTRAMUSCULAR; INTRAVENOUS; SUBCUTANEOUS at 12:48

## 2021-02-02 RX ADMIN — INSULIN HUMAN 10 UNITS: 100 INJECTION, SOLUTION PARENTERAL at 00:04

## 2021-02-02 RX ADMIN — MICONAZOLE NITRATE 1 APPLICATION: 20 CREAM TOPICAL at 22:38

## 2021-02-02 RX ADMIN — Medication 20 MG: at 09:08

## 2021-02-02 RX ADMIN — METHADONE HYDROCHLORIDE 23 MG: 10 CONCENTRATE ORAL at 14:47

## 2021-02-02 RX ADMIN — ISODIUM CHLORIDE 3 ML: 0.03 SOLUTION RESPIRATORY (INHALATION) at 13:05

## 2021-02-02 RX ADMIN — Medication 1 TABLET: at 08:57

## 2021-02-02 RX ADMIN — INSULIN LISPRO 3 UNITS: 100 INJECTION, SOLUTION INTRAVENOUS; SUBCUTANEOUS at 12:44

## 2021-02-02 RX ADMIN — DICLOFENAC SODIUM 4 G: 10 GEL TOPICAL at 09:04

## 2021-02-02 RX ADMIN — INSULIN HUMAN 10 UNITS: 100 INJECTION, SOLUTION PARENTERAL at 12:45

## 2021-02-02 RX ADMIN — DICLOFENAC SODIUM 4 G: 10 GEL TOPICAL at 18:08

## 2021-02-02 RX ADMIN — Medication 2000 UNITS: at 08:57

## 2021-02-02 RX ADMIN — ENOXAPARIN SODIUM 80 MG: 80 INJECTION SUBCUTANEOUS at 12:44

## 2021-02-02 RX ADMIN — LISINOPRIL 10 MG: 10 TABLET ORAL at 08:57

## 2021-02-02 RX ADMIN — SENNOSIDES 8.6 MG: 8.6 TABLET, FILM COATED ORAL at 08:57

## 2021-02-02 RX ADMIN — MICONAZOLE NITRATE 1 APPLICATION: 20 CREAM TOPICAL at 18:07

## 2021-02-02 RX ADMIN — LEVALBUTEROL HYDROCHLORIDE 1.25 MG: 1.25 SOLUTION, CONCENTRATE RESPIRATORY (INHALATION) at 20:56

## 2021-02-02 RX ADMIN — GLYCOPYRROLATE 1 MG: 1 TABLET ORAL at 22:34

## 2021-02-02 RX ADMIN — DICLOFENAC SODIUM 4 G: 10 GEL TOPICAL at 22:34

## 2021-02-02 RX ADMIN — SENNOSIDES 8.6 MG: 8.6 TABLET, FILM COATED ORAL at 17:43

## 2021-02-02 RX ADMIN — POLYETHYLENE GLYCOL 3350 17 G: 17 POWDER, FOR SOLUTION ORAL at 08:57

## 2021-02-02 RX ADMIN — ENOXAPARIN SODIUM 80 MG: 80 INJECTION SUBCUTANEOUS at 00:02

## 2021-02-02 RX ADMIN — ISODIUM CHLORIDE 3 ML: 0.03 SOLUTION RESPIRATORY (INHALATION) at 08:15

## 2021-02-02 RX ADMIN — ENOXAPARIN SODIUM 80 MG: 80 INJECTION SUBCUTANEOUS at 23:13

## 2021-02-02 RX ADMIN — GABAPENTIN 400 MG: 400 CAPSULE ORAL at 08:57

## 2021-02-02 RX ADMIN — DICLOFENAC SODIUM 4 G: 10 GEL TOPICAL at 12:43

## 2021-02-02 RX ADMIN — LEVALBUTEROL HYDROCHLORIDE 1.25 MG: 1.25 SOLUTION, CONCENTRATE RESPIRATORY (INHALATION) at 08:15

## 2021-02-02 RX ADMIN — GLYCOPYRROLATE 1 MG: 1 TABLET ORAL at 09:08

## 2021-02-02 RX ADMIN — INSULIN LISPRO 1 UNITS: 100 INJECTION, SOLUTION INTRAVENOUS; SUBCUTANEOUS at 00:02

## 2021-02-02 RX ADMIN — ISODIUM CHLORIDE 3 ML: 0.03 SOLUTION RESPIRATORY (INHALATION) at 20:56

## 2021-02-02 RX ADMIN — GABAPENTIN 400 MG: 400 CAPSULE ORAL at 22:34

## 2021-02-02 RX ADMIN — LEVALBUTEROL HYDROCHLORIDE 1.25 MG: 1.25 SOLUTION, CONCENTRATE RESPIRATORY (INHALATION) at 13:05

## 2021-02-02 RX ADMIN — GLYCOPYRROLATE 1 MG: 1 TABLET ORAL at 17:44

## 2021-02-02 RX ADMIN — GABAPENTIN 400 MG: 400 CAPSULE ORAL at 17:43

## 2021-02-02 RX ADMIN — METHADONE HYDROCHLORIDE 23 MG: 10 CONCENTRATE ORAL at 23:13

## 2021-02-02 NOTE — ASSESSMENT & PLAN NOTE
· Was extubated 1/25  · He had been getting NG tube feeds  He did pass swallow eval today and is starting to eat with assistance  I will stop the NG tube feeds  I will keep the NG tube tonight to make sure that he is eating adequately    Hopefully we can take the NG tube out tomorrow if he eats breakfast and lunch well

## 2021-02-02 NOTE — ASSESSMENT & PLAN NOTE
Tested positive for covid 12/31/2020  So he is able to come out of isolation  He has completed covid treatment  He finished a course of cefepime and vancomycin in case he had a bacterial pneumonia as well      He is on 2 liters nasal canula

## 2021-02-02 NOTE — UTILIZATION REVIEW
Continued Stay Review    Date: 2-2-21                           Current Patient Class:  Inpatient  Current Level of Care: med surg    HPI:60 y o  male initially admitted on 1-13-21  Pneumonia due to covid 23, vocal coard dysfunction after extubation on 1-25, isolation discontinued 1-31  Assessment/Plan:   2-1   Consult endocrinology   56 yr male with Type 2 diabetes, vasculopathy, HTN, HLD had a recent prolonged ICU stay requiring stress dose steroids and now has iatrogenic hypoglycemia  PLAN:     1  Type 2 diabetes  He is uncontrolled with recent A1c 11 6%  He has known insulin resistance requiring nearly 200u insulin/day  He was on Eliazar Koki and  as outpt  Presently, He seems to have hypoglycemia on Lantus 40u BID in context of TF diet      Plan:  Switch to  insulin 10u Q6hr [vimal correctional insulin with TF diet  Monitor fingersticks and titrate upwards  Will need close outpt follow up with endocrinology  Follows with Dr Jennifer Jensen      2  Adrenal insufficiency  Will check AM cortisol tomorrow  If this is normal, patient probably does not have adrenal insufficiency  May consider an ACTH stimulation test(CC service requested it for tomorrow)  Vascular surgery assessed stump today and found 2 cm area of necrosis  Continue daily wound care  Now npo with tube feed  Continues to require 2L O2nc to maintain O2 sats at least 92%  2-2  Video barium swallow evaluation completed  Risk of aspiration due to weak hypolaryngeal rise and pharyngeal dysphagia  Recommended  Puree , nectar thick liquids, crush medication  Continue finger stick glucose q6 hr   Continue 2 L O2nc to maintain O2 sats at least 92%  Daily wound care to stump  Continue PT/OT for functional deficits        Pertinent Labs/Diagnostic Results:       Results from last 7 days   Lab Units 02/01/21  0542 01/30/21  0238 01/29/21  0931 01/29/21  0736 01/28/21  0600   WBC Thousand/uL 9 27 9 43 8 66 5 20 8 83 HEMOGLOBIN g/dL 9 8* 9 2* 9 2* 5 2* 8 4*   HEMATOCRIT % 31 5* 30 8* 30 5* 17 8* 28 8*   PLATELETS Thousands/uL 136* 144* 165 77* 172   NEUTROS ABS Thousands/µL  --   --   --   --  7 51         Results from last 7 days   Lab Units 02/02/21  0936 02/01/21  0542 01/30/21  0238 01/29/21  0909 01/28/21  0600 01/27/21  0512   SODIUM mmol/L 134* 135* 145 146* 146* 146*   POTASSIUM mmol/L 3 9 3 9 4 1 3 5 3 6 4 1   CHLORIDE mmol/L 99* 98* 112* 114* 115* 117*   CO2 mmol/L 32 33* 31 31 27 25   ANION GAP mmol/L 3* 4 2* 1* 4 4   BUN mg/dL 16 16 26* 28* 27* 30*   CREATININE mg/dL 0 44* 0 48* 0 46* 0 52* 0 60 0 57*   EGFR ml/min/1 73sq m 122 118 120 114 108 110   CALCIUM mg/dL 8 1* 7 7* 7 7* 8 4 7 8* 7 8*   MAGNESIUM mg/dL  --  1 8 2 2 2 2  --  2 4   PHOSPHORUS mg/dL  --  3 4 3 1  --   --  2 5         Results from last 7 days   Lab Units 02/02/21  1216 02/02/21  0642 02/01/21  2355 02/01/21  1730 02/01/21  1558 02/01/21  1138 02/01/21  0757 02/01/21  0547 02/01/21  0546 02/01/21  0354 02/01/21  0236 02/01/21  0034   POC GLUCOSE mg/dl 253* 169* 179* 197* 195* 219* 170* 170* 256* 167* 150* 115     Results from last 7 days   Lab Units 02/02/21  0936 02/01/21  0542 01/30/21  0238 01/29/21  0909 01/28/21  0600 01/27/21  0512 01/26/21  2004 01/26/21  1422   GLUCOSE RANDOM mg/dL 172* 204* 164* 249* 282* 133 163* 116             BETA-HYDROXYBUTYRATE   Date Value Ref Range Status   01/11/2021 6 2 (H) <0 6 mmol/L Final        Vital Signs:       Date/  Time  Temp  Pulse  Resp  BP  MAP (mmHg)  SpO2  Calculated FIO2 (%) - Nasal Cannula  O2 Flow Rate (L/min)  Nasal Cannula O2 Flow Rate (L/min)  O2 Device  Patient Position - Orthostatic VS   02/02/21 0819  --  --  --  --  --  92 %   --  --  --  --  --   SpO2: 2L NC at 02/02/21 0819 02/02/21 0800  --  101  --  138/70  93  93 %  28  --  2 L/min  Nasal cannula  Lying   02/02/21 06:48:49  98 9 °F (37 2 °C)  105  20  138/70  93  92 %  --  --  --  --  Lying   02/02/21 02:14:55  99 2 °F (37 3 °C) 89  18  123/71  88  93 %  --  --  --  --  Lying               Medications:   Scheduled Medications:  cholecalciferol, 2,000 Units, Oral, Daily  Diclofenac Sodium, 4 g, Topical, 4x Daily  enoxaparin, 1 mg/kg, Subcutaneous, Q12H LIZETT  gabapentin, 400 mg, Oral, TID  glycopyrrolate, 1 mg, Oral, TID  insulin lispro, 1-6 Units, Subcutaneous, Q6H  insulin regular, 10 Units, Subcutaneous, 4x Daily  levalbuterol, 1 25 mg, Nebulization, TID  lisinopril, 10 mg, Oral, Daily  methadone, 23 mg, Per PEG Tube, Q8H [facility] long-term  SAMREEN ANTIFUNGAL, 1 application, Topical, TID  multivitamin-minerals, 1 tablet, Oral, Daily  omeprazole (PRILOSEC) suspension 2 mg/mL, 20 mg, Oral, Early Morning  polyethylene glycol, 17 g, Oral, Daily  senna, 1 tablet, Oral, BID  sodium chloride, 3 mL, Nebulization, TID      Continuous IV Infusions:     PRN Meds:  acetaminophen, 650 mg, Oral, Q6H PRN  bisacodyl, 10 mg, Rectal, Daily PRN  HYDROmorphone, 0 5 mg, Intravenous, Q3H PRN  HYDROmorphone, 0 5 mg, Intravenous, Q3H PRN  Lidocaine Viscous HCl, 15 mL, Swish & Spit, 4x Daily PRN  menthol-methyl salicylate, , Apply externally, 4x Daily PRN  phenol, 1 spray, Mouth/Throat, Q2H PRN  sodium chloride, 1 spray, Each Nare, Q1H PRN        Discharge Plan: to be determined     Network Utilization Review Department  ATTENTION: Please call with any questions or concerns to [phone] and carefully listen to the prompts so that you are directed to the right person  All voicemails are confidential   Audrey Eaton all requests for admission clinical reviews, approved or denied determinations and any other requests to dedicated fax number below belonging to the campus where the patient is receiving treatment   List of dedicated fax numbers for the Facilities:  [address] DENIALS (Administrative/Medical Necessity) [phone]   [address] (Maternity/NICU/Pediatrics) 261 [facility],7[address] Floor [phone]   Nicolas Scott [address]  [phone]   Ganesh Robles [address] ([address]) [address] [place] [address] Angeline Almeida 1481 [phone]   [address]51 [phone]

## 2021-02-02 NOTE — PROGRESS NOTES
Inpatient follow-up progress note        Assessment:  56 yr male with Type 2 diabetes, vasculopathy, HTN, HLD had a recent prolonged ICU stay requiring stress dose steroids and now has iatrogenic hypoglycemia      PLAN:     1  Type 2 diabetes  He is uncontrolled with recent A1c 11 6%  He has known insulin resistance requiring nearly 200u insulin/day  He was on Leonetta Ebbs and  as outpt  Presently, he has mild hyperglycemia in 150-200mg/dL range      Plan:  Switch to  insulin 12u Q6hr plus correctional insulin with TF diet  Monitor fingersticks and titrate upwards  Will need close outpt follow up with endocrinology  Follows with Dr Delfina Noriega      2  Adrenal insufficiency  AM cortisol 17 8ug/mL  This is normal, patient does not have adrenal insufficiency  No further testing needed  S:  NGT in situ, nurses report confusion and unable to do meaningful conversation over phone  O:  Patient seen and examined personally  /70 (BP Location: Left arm)   Pulse 101   Temp 98 9 °F (37 2 °C) (Oral)   Resp 20   Ht 5' 6" (1 676 m)   Wt 80 9 kg (178 lb 6 4 oz)   SpO2 95% Comment: 2L  BMI 28 79 kg/m²     Physical Exam  Vitals signs reviewed  HENT:      Head: Normocephalic  Eyes:      Pupils: Pupils are equal, round, and reactive to light  Musculoskeletal:         General: No deformity  Neurological:      Mental Status: He is alert and oriented to person, place, and time           Current Facility-Administered Medications   Medication Dose Route Frequency Provider Last Rate    acetaminophen  650 mg Oral Q6H PRN Shannan Hopkins, VERONICA      bisacodyl  10 mg Rectal Daily PRN [employer], VERONICA      cholecalciferol  2,000 Units Oral Daily VERONICA Kennedy      Diclofenac Sodium  4 g Topical 4x Daily VERONICA Kennedy      enoxaparin  1 mg/kg Subcutaneous Q12H [facility] VERONICA Kennedy      gabapentin  400 mg Oral TID [employer]VERONICA      glycopyrrolate  1 mg Oral TID [employer], STONENP      HYDROmorphone  0 5 mg Intravenous Q3H PRN Shannan A Sedora, CRNP      HYDROmorphone  0 5 mg Intravenous Q3H PRN Shannan A Sedora, CRNP      insulin lispro  1-6 Units Subcutaneous Q6H Jenifer Nguyen,       insulin regular  10 Units Subcutaneous 4x Daily Fatemeh Sol MD      levalbuterol  1 25 mg Nebulization TID Shannan A Sedora, CRNP      Lidocaine Viscous HCl  15 mL Swish & Spit 4x Daily PRN Shannan A Sedora, CRNP      lisinopril  10 mg Oral Daily Shannan A Sedora, CRNP      menthol-methyl salicylate   Apply externally 4x Daily PRN [employer], CRNP      methadone  23 mg Per PEG Tube Q8H [facility] & assisted Shannan A Sedora, CRNP      SAMREEN ANTIFUNGAL  1 application Topical TID Sonia Mccall,       multivitamin-minerals  1 tablet Oral Daily Shannan A Sedora, CRNP      omeprazole (PRILOSEC) suspension 2 mg/mL  20 mg Oral Early Morning Shannan A Sedora, CRNP      phenol  1 spray Mouth/Throat Q2H PRN Shannan A Sedora, CRNP      polyethylene glycol  17 g Oral Daily Shannan A Sedora, CRNP      senna  1 tablet Oral BID Shannan A Sedora, CRNP      sodium chloride  1 spray Each Nare Q1H PRN Jenifer Nguyen,       sodium chloride  3 mL Nebulization TID Shannan A Sedora, VERONICA          Lab, Imaging and other studies:   POC Glucose (mg/dl)   Date Value   02/02/2021 253 (H)   02/02/2021 169 (H)   02/01/2021 179 (H)   02/01/2021 197 (H)   02/01/2021 195 (H)   02/01/2021 219 (H)   02/01/2021 170 (H)   02/01/2021 170 (H)   02/01/2021 256 (H)   02/01/2021 167 (H)

## 2021-02-02 NOTE — CASE MANAGEMENT
Cm following pt  Recs for STR when medically cleared for dc  CM attempted to contact pt over room phone for choices, no answer  Cm will continue to follow

## 2021-02-02 NOTE — PROCEDURES
Video Swallow Study      Patient Name: Truong Baxter  LFZYZ'S Date: 2/2/2021        Past Medical History  Past Medical History:   Diagnosis Date    Diabetes mellitus ([facility] [address] )     GERD (gastroesophageal reflux disease)     Hypercholesteremia     Hypertension     Methadone use ([facility] )         Past Surgical History  Past Surgical History:   Procedure Laterality Date    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    Procedure: AMPUTATION ABOVE KNEE (AKA); Surgeon: Светлана Marshall MD;  [facility];  Service: Vascular    AMPUTATION ABOVE KNEE (AKA) Right 1/18/2021    Procedure: AMPUTATION ABOVE KNEE (AKA) FORMALIZATION,  R AKA wound washout, wound closure;  Surgeon: Светлана Marshall MD;  [facility];  Service: Vascular    ARTERIOGRAM Right 1/13/2021    Procedure: ARTERIOGRAM;  Surgeon: Charito Al DO;  [facility];  Service: Vascular    IR LOWER EXTREMITY ANGIOGRAM  1/14/2021    THROMBECTOMY W/ EMBOLECTOMY Right 1/13/2021    Procedure: EMBOLECTOMY/THROMBECTOMY LOWER EXTREMITY;  Surgeon: Charito Al DO;  [facility];  Service: Vascular       Video Barium Swallow Study    Summary:  Images are on PACS for review  Pt presents w/ mod oral, mild/mod pharyngeal dysphagia sharath by reduced bolus manipulation & trnasfers w/ solids, loss prior to swallows, varied delay w/ weak hyolaryngeal rise & risk for aspiration w/ deep spill w/ thin  NO immed aspiration noted on today's study  The pt has difficulty maintaining his posture for safe intake & will need repositioning often w/ po          Recommendations:  Diet: puree to begin   Liquids: nectar thick to begin   Meds: crush in puree for now  Strategies: small bites, sips w/ a cued 2* swallow   Upright position  F/u ST tx:yes  Therapy Prognosis:   Prognosis considerations:medical condition   Full Supervision  Aspiration Precautions  Reflux Precautions    Patient's goal:        Goals:  Pt will tolerate least restrictive diet w/out s/s aspiration or oral/pharyngeal difficulties  Previous VBS:  -  Current Diet:   NG, tf, NPO   Premorbid diet:  Regular w/ thin, pt states more on the soft side  Dentition:  Edentulous-?able some sparse   O2 requirement:  NC  Oral mech:  Strength and ROM: fair, noted reduced ability to keep himself upright in the bed, shifting & flailing his UEs to the R/L, leaning to the R at times  Had to reposition mult times  Vocal Quality/Speech:  Harsh, low volume w/ some mild hoarseness  Cognitive status:  AAwake, alert, cooperative  Consistencies administered: Barium laden applesauce, chicken, soft solid, hard solid, honey thick, nectar thick, thin liquids, 13mm barium pill whole in puree  Liquids were administered by tsp and straw  Difficulty w/cup sips 2* ng in the way    Pt was seated laterally at 90 degrees  Oral stage:    Lip closure: wfl  Mastication: prolonged, ineffective with all solids  Bolus formation: mild/mod reduced w/ solids, min w/ the puree  Bolus control: fair w/ solids, puree, noted loss w/ the thin   Transfer: mildly reduced lingual retraction   Residue: persistent mild lingual residue w/ need for mult swallows, mod/severe w/ the hard solids /w need for liquid wash to clear  Pharyngeal stage:    Swallow promptness: mild-mod delayed with liquids   Spill to valleculae: with all material   Spill to pyriforms: with nt & thin by tsp/straw, increased w/ the thin   Epiglottic inversion: min reduced, slow return   Laryngeal excursion: mild/mod reduced anterior movement of the material  Pharyngeal constriction: fair   Vallecular retention: min   Pyriform retention: mild pooling   PPW coating: min   Osteophytes: -  CP prominence:-  Retropulsion from prominence:-  Transient penetration: with straw nt, thin  Epiglottic undercoat: periodically w/ the liquids   Penetration: trace w/ ht x1, nt x1      Aspiration:no gross noted  Strategies: smaller sips, mult swallows  Pt w/ some difficulty using a 2* swallow, noted constantly throwing his head & shoulders back & forth       Screening of Esophageal stage:    Dysmotility: mild throughout   Retropulsion:  Tertiary contractions:  Reflux:  Retention:  Stricture:  LES:

## 2021-02-02 NOTE — WOUND OSTOMY CARE
Patient is already being followed by wound care, please see wound/skin care recommendations written in nursing orders as well as treatment sticky note  Patient is schedule for weekly follow up to evaluate his evolving and healing sacral DTI    For questions or concerns, please tiger text wound care team       Que Ramirez RN, BSN, [place]

## 2021-02-02 NOTE — PROGRESS NOTES
Tube feeding due to be changed at this time  Unable to see active tube feed order as diet has been adjusted  Reached out to Dr Karen Peoples via TT  Stated to hold tube feed for now pending how patient tolerates dinner PO

## 2021-02-02 NOTE — PROGRESS NOTES
Progress Note Renny Stevens 1960, 56 y o  male MRN: [id number]    Unit/Bed#: -01 Encounter: [id number]    Primary Care Provider: VERONICA Gregorio   Date and time admitted to hospital: 2021  2:28 PM        * Pneumonia due to COVID-19 virus  Assessment & Plan  Tested positive for covid 2020  So he is able to come out of isolation  He has completed covid treatment  He finished a course of cefepime and vancomycin in case he had a bacterial pneumonia as well  He is on 2 liters nasal canula      Vocal cord dysfunction  Assessment & Plan  · Was extubated   · He had been getting NG tube feeds  He did pass swallow eval today and is starting to eat with assistance  I will stop the NG tube feeds  I will keep the NG tube tonight to make sure that he is eating adequately  Hopefully we can take the NG tube out tomorrow if he eats breakfast and lunch well    Adrenal insufficiency ([address] )  Assessment & Plan  Not thought to have AI per endocrine  Stop hydrocortisone    Myopathy  Assessment & Plan  Due to critical illness and steroid use  Working with PT    Aortic thrombus [facility])  Assessment & Plan  Caused compromise of right femoral artery  Required AKA on 21  On lovenox      DM2 (diabetes mellitus, type 2) [facility])  Assessment & Plan  Lab Results   Component Value Date    HGBA1C 11 6 (H) 2020       Recent Labs     21  2355 21  0642 21  1216 21  1716   POCGLU 179* 169* 253* 135       Blood Sugar Average: Last 72 hrs:  (P) 545 [id number]     Appreciate endocrine consult  Adjusted meds          Subjective:   He is actually eating his dinner  He is hungry    He didn't talk to me, but does shake his head yes and no      Objective:     Vitals:   Temp (24hrs), Av 9 °F (37 2 °C), Min:97 8 °F (36 6 °C), Max:99 5 °F (37 5 °C)    Temp:  [97 8 °F (36 6 °C)-99 5 °F (37 5 °C)] 99 5 °F (37 5 °C)  HR:  [] 106  Resp:  [18-20] 20  BP: (123-142)/(65-76) 125/75  SpO2:  [92 %-97 %] 94 %  Body mass index is 28 79 kg/m²  Input and Output Summary (last 24 hours): Intake/Output Summary (Last 24 hours) at 2/2/2021 1831  Last data filed at 2/2/2021 1829  Gross per 24 hour   Intake 1531 ml   Output 2900 ml   Net -1369 ml       Physical Exam:     Physical Exam  Vitals signs and nursing note reviewed  Constitutional:       General: He is not in acute distress  HENT:      Head: Normocephalic and atraumatic  Eyes:      Pupils: Pupils are equal, round, and reactive to light  Cardiovascular:      Rate and Rhythm: Normal rate and regular rhythm  Heart sounds: No murmur  No friction rub  No gallop  Pulmonary:      Effort: Pulmonary effort is normal       Breath sounds: Normal breath sounds  No wheezing or rales  Abdominal:      General: Bowel sounds are normal       Palpations: Abdomen is soft  Tenderness: There is no abdominal tenderness  Musculoskeletal:      Comments: AKA site C/D/I                Additional Data:     Labs:    Results from last 7 days   Lab Units 02/02/21  1314   WBC Thousand/uL 8 44   HEMOGLOBIN g/dL 9 2*   HEMATOCRIT % 29 3*   PLATELETS Thousands/uL 151   NEUTROS PCT % 85*   LYMPHS PCT % 7*   MONOS PCT % 6   EOS PCT % 1     Results from last 7 days   Lab Units 02/02/21  0936   POTASSIUM mmol/L 3 9   CHLORIDE mmol/L 99*   CO2 mmol/L 32   BUN mg/dL 16   CREATININE mg/dL 0 44*   CALCIUM mg/dL 8 1*         Results from last 7 days   Lab Units 02/02/21  1716 02/02/21  1216 02/02/21  0642 02/01/21  2355 02/01/21  1730 02/01/21  1558 02/01/21  1138 02/01/21  0757 02/01/21  0547 02/01/21  0546 02/01/21  0354 02/01/21  0236   POC GLUCOSE mg/dl 135 253* 169* 179* 197* 195* 219* 170* 170* 256* 167* 150*               * I Have Reviewed All Lab Data     Recent Cultures (last 7 days):             Last 24 Hours Medication List:   Current Facility-Administered Medications   Medication Dose Route Frequency Provider Last Rate    acetaminophen 650 mg Oral Q6H PRN Shannan A Sedora, CRNP      bisacodyl  10 mg Rectal Daily PRN [facility], CRNP      cholecalciferol  2,000 Units Oral Daily Shannan A Sedora, CRNP      Diclofenac Sodium  4 g Topical 4x Daily Shannan A Sedora, CRNP      enoxaparin  1 mg/kg Subcutaneous Q12H [address] Shannan A Sedora, CRNP      gabapentin  400 mg Oral TID Stefanie A Sedora, CRNP      glycopyrrolate  1 mg Oral TID [facility], CRNP      HYDROmorphone  0 5 mg Intravenous Q3H PRN Shannan A Sedora, CRNP      HYDROmorphone  0 5 mg Intravenous Q3H PRN Shannan A Sedora, CRNP      [START ON 2/3/2021] insulin lispro  1-6 Units Subcutaneous TID With Meals Dwight S Prechtel, DO      insulin regular  12 Units Subcutaneous 4x Daily Pankaj Viveros MD      levalbuterol  1 25 mg Nebulization TID Stefanie A Sedora, CRNP      Lidocaine Viscous HCl  15 mL Swish & Spit 4x Daily PRN Shannan A Sedora, CRNP      lisinopril  10 mg Oral Daily Stefanie A Sedora, CRNP      menthol-methyl salicylate   Apply externally 4x Daily PRN [facility], CRNP      methadone  23 mg Per PEG Tube Q8H [address] Stefanie A Sedora, CRNP      SAMREEN ANTIFUNGAL  1 application Topical TID Hetul Mccall, DO      multivitamin-minerals  1 tablet Oral Daily Shannan A Sedora, CRNP      omeprazole (PRILOSEC) suspension 2 mg/mL  20 mg Oral Early Morning Shannan A Sedora, CRNP      phenol  1 spray Mouth/Throat Q2H PRN Shannan A Sedora, CRNP      polyethylene glycol  17 g Oral Daily Shannan A Sedora, CRNP      senna  1 tablet Oral BID Shannan A Sedora, CRNP      sodium chloride  1 spray Each Nare Q1H PRN Monika Molina, DO      sodium chloride  3 mL Nebulization TID Shannan A Sedora, CRNP           VTE Pharmacologic Prophylaxis:   Pharmacologic: Enoxaparin (Lovenox)      Current Length of Stay: 20 day(s)    Current Patient Status: Inpatient       Discharge Plan: likely needs STR at some point    Code Status: Level 1 - Full Code Today, Patient Was Seen By: Geovanny Stanton DO    ** Please Note: Dictation voice to text software may have been used in the creation of this document   **

## 2021-02-02 NOTE — ASSESSMENT & PLAN NOTE
Lab Results   Component Value Date    HGBA1C 11 6 (H) 12/09/2020       Recent Labs     02/01/21  2355 02/02/21  0642 02/02/21  1216 02/02/21  1716   POCGLU 179* 169* 253* 135       Blood Sugar Average: Last 72 hrs:  (P) 060 [id number]     Appreciate endocrine consult  Adjusted meds

## 2021-02-03 LAB
ANION GAP SERPL CALCULATED.3IONS-SCNC: 1 MMOL/L (ref 4–13)
BASOPHILS # BLD AUTO: 0.02 THOUSANDS/ΜL (ref 0–0.1)
BASOPHILS NFR BLD AUTO: 0 % (ref 0–1)
BUN SERPL-MCNC: 14 MG/DL (ref 5–25)
CALCIUM SERPL-MCNC: 8.4 MG/DL (ref 8.3–10.1)
CHLORIDE SERPL-SCNC: 101 MMOL/L (ref 100–108)
CO2 SERPL-SCNC: 35 MMOL/L (ref 21–32)
CREAT SERPL-MCNC: 0.39 MG/DL (ref 0.6–1.3)
CRP SERPL QL: 213 MG/L
D DIMER PPP FEU-MCNC: 1.49 UG/ML FEU
EOSINOPHIL # BLD AUTO: 0.13 THOUSAND/ΜL (ref 0–0.61)
EOSINOPHIL NFR BLD AUTO: 2 % (ref 0–6)
ERYTHROCYTE [DISTWIDTH] IN BLOOD BY AUTOMATED COUNT: 17.3 % (ref 11.6–15.1)
FERRITIN SERPL-MCNC: 1033 NG/ML (ref 8–388)
GFR SERPL CREATININE-BSD FRML MDRD: 129 ML/MIN/1.73SQ M
GLUCOSE SERPL-MCNC: 146 MG/DL (ref 65–140)
GLUCOSE SERPL-MCNC: 175 MG/DL (ref 65–140)
GLUCOSE SERPL-MCNC: 177 MG/DL (ref 65–140)
GLUCOSE SERPL-MCNC: 203 MG/DL (ref 65–140)
GLUCOSE SERPL-MCNC: 204 MG/DL (ref 65–140)
GLUCOSE SERPL-MCNC: 52 MG/DL (ref 65–140)
GLUCOSE SERPL-MCNC: 55 MG/DL (ref 65–140)
HCT VFR BLD AUTO: 30.4 % (ref 36.5–49.3)
HGB BLD-MCNC: 9.3 G/DL (ref 12–17)
IMM GRANULOCYTES # BLD AUTO: 0.05 THOUSAND/UL (ref 0–0.2)
IMM GRANULOCYTES NFR BLD AUTO: 1 % (ref 0–2)
LYMPHOCYTES # BLD AUTO: 0.66 THOUSANDS/ΜL (ref 0.6–4.47)
LYMPHOCYTES NFR BLD AUTO: 8 % (ref 14–44)
MAGNESIUM SERPL-MCNC: 1.8 MG/DL (ref 1.6–2.6)
MCH RBC QN AUTO: 29.5 PG (ref 26.8–34.3)
MCHC RBC AUTO-ENTMCNC: 30.6 G/DL (ref 31.4–37.4)
MCV RBC AUTO: 97 FL (ref 82–98)
MONOCYTES # BLD AUTO: 0.66 THOUSAND/ΜL (ref 0.17–1.22)
MONOCYTES NFR BLD AUTO: 8 % (ref 4–12)
NEUTROPHILS # BLD AUTO: 6.52 THOUSANDS/ΜL (ref 1.85–7.62)
NEUTS SEG NFR BLD AUTO: 81 % (ref 43–75)
NRBC BLD AUTO-RTO: 0 /100 WBCS
PLATELET # BLD AUTO: 147 THOUSANDS/UL (ref 149–390)
PMV BLD AUTO: 10.8 FL (ref 8.9–12.7)
POTASSIUM SERPL-SCNC: 3 MMOL/L (ref 3.5–5.3)
RBC # BLD AUTO: 3.15 MILLION/UL (ref 3.88–5.62)
SODIUM SERPL-SCNC: 137 MMOL/L (ref 136–145)
WBC # BLD AUTO: 8.04 THOUSAND/UL (ref 4.31–10.16)

## 2021-02-03 PROCEDURE — 85379 FIBRIN DEGRADATION QUANT: CPT | Performed by: HOSPITALIST

## 2021-02-03 PROCEDURE — 80048 BASIC METABOLIC PNL TOTAL CA: CPT | Performed by: HOSPITALIST

## 2021-02-03 PROCEDURE — 82728 ASSAY OF FERRITIN: CPT | Performed by: HOSPITALIST

## 2021-02-03 PROCEDURE — 97535 SELF CARE MNGMENT TRAINING: CPT

## 2021-02-03 PROCEDURE — 85025 COMPLETE CBC W/AUTO DIFF WBC: CPT | Performed by: HOSPITALIST

## 2021-02-03 PROCEDURE — 86140 C-REACTIVE PROTEIN: CPT | Performed by: HOSPITALIST

## 2021-02-03 PROCEDURE — 94640 AIRWAY INHALATION TREATMENT: CPT

## 2021-02-03 PROCEDURE — 83735 ASSAY OF MAGNESIUM: CPT | Performed by: HOSPITALIST

## 2021-02-03 PROCEDURE — 92526 ORAL FUNCTION THERAPY: CPT

## 2021-02-03 PROCEDURE — 99233 SBSQ HOSP IP/OBS HIGH 50: CPT | Performed by: INTERNAL MEDICINE

## 2021-02-03 PROCEDURE — 82948 REAGENT STRIP/BLOOD GLUCOSE: CPT

## 2021-02-03 PROCEDURE — 94760 N-INVAS EAR/PLS OXIMETRY 1: CPT

## 2021-02-03 PROCEDURE — 97530 THERAPEUTIC ACTIVITIES: CPT

## 2021-02-03 PROCEDURE — 99232 SBSQ HOSP IP/OBS MODERATE 35: CPT | Performed by: INTERNAL MEDICINE

## 2021-02-03 RX ORDER — POTASSIUM CHLORIDE 20 MEQ/1
40 TABLET, EXTENDED RELEASE ORAL ONCE
Status: COMPLETED | OUTPATIENT
Start: 2021-02-04 | End: 2021-02-04

## 2021-02-03 RX ORDER — INSULIN GLARGINE 100 [IU]/ML
8 INJECTION, SOLUTION SUBCUTANEOUS
Status: DISCONTINUED | OUTPATIENT
Start: 2021-02-03 | End: 2021-02-03

## 2021-02-03 RX ORDER — POTASSIUM CHLORIDE 14.9 MG/ML
20 INJECTION INTRAVENOUS ONCE
Status: COMPLETED | OUTPATIENT
Start: 2021-02-03 | End: 2021-02-03

## 2021-02-03 RX ORDER — INSULIN GLARGINE 100 [IU]/ML
20 INJECTION, SOLUTION SUBCUTANEOUS
Status: DISCONTINUED | OUTPATIENT
Start: 2021-02-03 | End: 2021-02-05

## 2021-02-03 RX ORDER — DEXTROSE MONOHYDRATE 25 G/50ML
INJECTION, SOLUTION INTRAVENOUS
Status: COMPLETED
Start: 2021-02-03 | End: 2021-02-03

## 2021-02-03 RX ADMIN — Medication 2000 UNITS: at 09:07

## 2021-02-03 RX ADMIN — DEXTROSE MONOHYDRATE: 500 INJECTION PARENTERAL at 06:31

## 2021-02-03 RX ADMIN — ENOXAPARIN SODIUM 80 MG: 80 INJECTION SUBCUTANEOUS at 12:56

## 2021-02-03 RX ADMIN — GABAPENTIN 400 MG: 400 CAPSULE ORAL at 16:48

## 2021-02-03 RX ADMIN — METHADONE HYDROCHLORIDE 23 MG: 10 CONCENTRATE ORAL at 05:55

## 2021-02-03 RX ADMIN — MICONAZOLE NITRATE 1 APPLICATION: 20 CREAM TOPICAL at 16:51

## 2021-02-03 RX ADMIN — LEVALBUTEROL HYDROCHLORIDE 1.25 MG: 1.25 SOLUTION, CONCENTRATE RESPIRATORY (INHALATION) at 13:46

## 2021-02-03 RX ADMIN — DICLOFENAC SODIUM 4 G: 10 GEL TOPICAL at 17:11

## 2021-02-03 RX ADMIN — LEVALBUTEROL HYDROCHLORIDE 1.25 MG: 1.25 SOLUTION, CONCENTRATE RESPIRATORY (INHALATION) at 08:08

## 2021-02-03 RX ADMIN — GABAPENTIN 400 MG: 400 CAPSULE ORAL at 21:12

## 2021-02-03 RX ADMIN — DICLOFENAC SODIUM 4 G: 10 GEL TOPICAL at 09:20

## 2021-02-03 RX ADMIN — Medication 1 TABLET: at 09:07

## 2021-02-03 RX ADMIN — GLYCOPYRROLATE 1 MG: 1 TABLET ORAL at 21:13

## 2021-02-03 RX ADMIN — ISODIUM CHLORIDE 3 ML: 0.03 SOLUTION RESPIRATORY (INHALATION) at 13:46

## 2021-02-03 RX ADMIN — INSULIN LISPRO 10 UNITS: 100 INJECTION, SOLUTION INTRAVENOUS; SUBCUTANEOUS at 18:22

## 2021-02-03 RX ADMIN — ISODIUM CHLORIDE 3 ML: 0.03 SOLUTION RESPIRATORY (INHALATION) at 20:03

## 2021-02-03 RX ADMIN — GLYCOPYRROLATE 1 MG: 1 TABLET ORAL at 12:56

## 2021-02-03 RX ADMIN — Medication 20 MG: at 06:01

## 2021-02-03 RX ADMIN — DICLOFENAC SODIUM 4 G: 10 GEL TOPICAL at 12:57

## 2021-02-03 RX ADMIN — SENNOSIDES 8.6 MG: 8.6 TABLET, FILM COATED ORAL at 09:07

## 2021-02-03 RX ADMIN — LEVALBUTEROL HYDROCHLORIDE 1.25 MG: 1.25 SOLUTION, CONCENTRATE RESPIRATORY (INHALATION) at 20:03

## 2021-02-03 RX ADMIN — GLYCOPYRROLATE 1 MG: 1 TABLET ORAL at 16:49

## 2021-02-03 RX ADMIN — ISODIUM CHLORIDE 3 ML: 0.03 SOLUTION RESPIRATORY (INHALATION) at 08:08

## 2021-02-03 RX ADMIN — DICLOFENAC SODIUM 4 G: 10 GEL TOPICAL at 21:14

## 2021-02-03 RX ADMIN — SENNOSIDES 8.6 MG: 8.6 TABLET, FILM COATED ORAL at 17:12

## 2021-02-03 RX ADMIN — POTASSIUM CHLORIDE 10 MEQ: 14.9 INJECTION, SOLUTION INTRAVENOUS at 21:12

## 2021-02-03 RX ADMIN — METHADONE HYDROCHLORIDE 23 MG: 10 CONCENTRATE ORAL at 15:01

## 2021-02-03 RX ADMIN — METHADONE HYDROCHLORIDE 23 MG: 10 CONCENTRATE ORAL at 21:12

## 2021-02-03 RX ADMIN — GABAPENTIN 400 MG: 400 CAPSULE ORAL at 09:07

## 2021-02-03 RX ADMIN — MICONAZOLE NITRATE 1 APPLICATION: 20 CREAM TOPICAL at 21:14

## 2021-02-03 RX ADMIN — INSULIN GLARGINE 20 UNITS: 100 INJECTION, SOLUTION SUBCUTANEOUS at 21:11

## 2021-02-03 RX ADMIN — ENOXAPARIN SODIUM 80 MG: 80 INJECTION SUBCUTANEOUS at 23:02

## 2021-02-03 NOTE — WOUND OSTOMY CARE
Progress Note - Wound   Mallory Quevedo 56 y o  male MRN: [id number]  Unit/Bed#: -01 Encounter: [id number]        Assessment:   Patient seen for continued skin and wound care  Patient remains incontinent with ongoing care and turns, he is able to verbalize his needs, needs moderate assist of two for turns and repositioning in bed  Patient agreeable for wound care nurse to assess skin and provide care  Findings:  1-Mid sacral DTI now fully evolved, residual wounds are presenting with yellow slough at base with mild radiating erythema  Wound bed is non induated, periwound non macerated and healing appropriately  2,3 -R hand index and 5th fingers with DTI at the base  Both wounds presenting as sara purple discoloraiton, non indurated or evolved, likely to heal without further deterioration or evolving to any other wounds  4- L forearm with healing skin tear, Wound bed presenting with thin yellow slough, non macerated or eythemic periwound  Continue with following wound/Skin care plans:   1-Apply SAMREEN moisture barrier/antifungal cream to sacrum, buttocks TID  2-Elevate heels to offload pressure  3-Ehob cushion in chair when out of bed  4-Moisturize skin daily with skin nourishing cream    5-Turn/reposition q2h or when medically stable for pressure re-distribution on skin  6-Allevyn foam to heel, juan w/P, peel foam check skin integrity q-shift  Change q3d    7-Cleanse R forearm skin tear with NSS, Dermagran to wound bed, DSD and Lester  Change every 3 days  Vitals: Blood pressure 118/66, pulse (!) 107, temperature 98 5 °F (36 9 °C), resp  rate 20, height 5' 6" (1 676 m), weight 74 1 kg (163 lb 6 4 oz), SpO2 97 %  ,Body mass index is 26 37 kg/m²  Wound 01/12/21 Pressure Injury Sacrum Mid (Active)   Wound Image    02/03/21 1426   Wound Description Slough; Yellow 02/03/21 1426   Pressure Injury Stage DTPI 02/03/21 1426   Renuka-wound Assessment Intact 02/03/21 1426   Wound Length (cm) 9 cm 02/03/21 1426   Wound Width (cm) 11 cm 02/03/21 1426   Wound Surface Area (cm^2) 99 cm^2 02/03/21 1426   Drainage Amount None 02/03/21 1200   Drainage Description Bloody 01/29/21 2045   Treatments Site care 02/03/21 1426   Dressing Moisture barrier 02/03/21 1426   Dressing Changed Changed 02/01/21 1750   Patient Tolerance Tolerated well 02/03/21 1426   Dressing Status Clean;Dry; Intact 02/03/21 1200       Wound 01/13/21 Groin Right (Active)   Wound Description Clean;Dry; Intact 02/03/21 1200   Renuka-wound Assessment Clean;Dry; Intact 02/03/21 1200   Wound Site Closure Approximated;Staples 02/03/21 1200   Drainage Amount None 02/03/21 1200   Treatments Cleansed 02/02/21 0800   Dressing Open to air 02/03/21 1200   Dressing Changed Changed by provider 01/20/21 0800   Dressing Status Clean;Dry; Intact 02/03/21 0230       Wound 01/14/21 Thigh Right (Active)   Wound Description Clean;Dry; Intact 02/03/21 1200   Renuka-wound Assessment Clean;Dry; Intact 02/03/21 1200   Wound Site Closure Staples 02/03/21 1200   Drainage Amount None 02/03/21 1200   Drainage Description Serosanguineous 02/03/21 0230   Treatments Cleansed 02/03/21 0800   Dressing Open to air 02/03/21 1426   Wound packed? Yes 01/14/21 1418   Dressing Changed Changed 01/30/21 1915   Patient Tolerance Tolerated well 01/30/21 1915   Dressing Status Clean;Dry; Intact 02/03/21 0230       Wound 01/19/21 Skin Tear Skin tear Arm Right (Active)   Wound Image    02/03/21 1424   Wound Description Slough; Yellow 02/03/21 1424   Pressure Injury Stage O 02/03/21 1424   Renuka-wound Assessment Intact 02/03/21 1424   Wound Length (cm) 2 2 cm 02/03/21 1424   Wound Width (cm) 1 cm 02/03/21 1424   Wound Depth (cm) 0 1 cm 02/03/21 1424   Wound Surface Area (cm^2) 2 2 cm^2 02/03/21 1424   Wound Volume (cm^3) 0 22 cm^3 02/03/21 1424   Calculated Wound Volume (cm^3) 0 22 cm^3 02/03/21 1424   Wound Site Closure Open to air 02/02/21 0800   Drainage Amount None 02/03/21 1424   Drainage Description Serous 02/02/21 0800   Treatments Elevated 01/30/21 1140   Dressing Open to air 02/02/21 0800   Patient Tolerance Tolerated well 02/03/21 1424       Wound 01/19/21 Pressure Injury Finger (Comment which one) Right;Posterior (Active)   Wound Image   02/03/21 1435   Wound Description Light purple 02/03/21 1435   Pressure Injury Stage DTPI 02/03/21 1435   Renuka-wound Assessment Intact 02/03/21 1435   Wound Length (cm) 1 5 cm 02/03/21 1435   Wound Width (cm) 2 cm 02/03/21 1435   Wound Surface Area (cm^2) 3 cm^2 02/03/21 1435   Wound Site Closure Open to air 02/02/21 0800   Drainage Amount None 02/01/21 0400   Treatments Site care;Elevated 02/01/21 0800   Dressing Open to air 01/31/21 0744   Patient Tolerance Tolerated well 02/03/21 1435       Wound 02/03/21 Pressure Injury Finger (Comment which one) Posterior;Right (Active)   Wound Image   02/03/21 1525   Wound Description Light purple 02/03/21 1525   Pressure Injury Stage DTPI 02/03/21 1525   Renuka-wound Assessment Intact 02/03/21 1525   Wound Length (cm) 1 cm 02/03/21 1525   Wound Width (cm) 1 5 cm 02/03/21 1525   Wound Surface Area (cm^2) 1 5 cm^2 02/03/21 1525   Drainage Amount None 02/03/21 1525   Patient Tolerance Tolerated well 02/03/21 1525           Our skin care recommendations remains as nursing orders, wound care to continue following, please tiger text or call team with questions and concerns        Patrick Marquez, RN, BSN, [facility]

## 2021-02-03 NOTE — OCCUPATIONAL THERAPY NOTE
OccupationalTherapy Progress Note     Patient Name: Mary Connelly  HCFGN'D Date: 2/3/2021  Problem List  Principal Problem:    Pneumonia due to COVID-19 virus  Active Problems:    HTN (hypertension)    BMI 28 0-28 9,adult    DM2 (diabetes mellitus, type 2) ([facility] [address] )    Acute metabolic encephalopathy    Acute respiratory failure with hypoxia (HCC)    Sepsis due to COVID-19 [facility])    Aortic thrombus (HCC)    Anemia    Hypokalemia    Myopathy    Adrenal insufficiency (HCC)    Vocal cord dysfunction          02/03/21 1345   OT Last Visit   OT Visit Date 02/03/21   Note Type   Note Type Treatment   Restrictions/Precautions   Weight Bearing Precautions Per Order Yes   RLE Weight Bearing Per Order NWB  (RECENT AKA)   Other Precautions Cognitive; Chair Alarm; Bed Alarm;WBS;Multiple lines; Fall Risk;Pain  (RECENT H/O COVID-19)   Pain Assessment   Pain Assessment Tool 0-10   Pain Score 4   Pain Location/Orientation Orientation: Right;Location: Leg   Patient's Stated Pain Goal No pain   Hospital Pain Intervention(s) Repositioned; Ambulation/increased activity; Emotional support   ADL   Where Assessed Edge of bed   Grooming Assistance 2  Maximal Assistance   Grooming Deficit Steadying;Setup;Verbal cueing;Supervision/safety; Increased time to complete;Brushing hair   Grooming Comments PT REQUIRED MAX A FOR LT GROOMING INCLUDING BRUSHING HAIR WITH PROXIMAL SUPPORT PROVIDED TO LUE DURING TASK 2' SIGNIFICANT WEAKNESS  ASSIST FOR THOROUGHNESS TO COMPLETE  Toileting Assistance  1  Total Assistance   Toileting Deficit Perineal hygiene   Bed Mobility   Rolling R 2  Maximal assistance   Additional items Assist x 1; Increased time required;Verbal cues;LE management   Rolling L 2  Maximal assistance   Additional items Assist x 1; Increased time required;Verbal cues;LE management   Supine to Sit 2  Maximal assistance   Additional items Assist x 2; Increased time required;Verbal cues;LE management   Sit to Supine 2  Maximal assistance   Additional items Assist x 2; Increased time required;Verbal cues;LE management   Cognition   Overall Cognitive Status Impaired   Arousal/Participation Alert; Cooperative   Attention Attends with cues to redirect   Orientation Level Oriented to person;Oriented to place;Oriented to situation   Following Commands Follows one step commands without difficulty   Comments PATIENT IS PLEASANT, COOPERATIVE AND MOTIVATED TO PARTICIPATE  PATIENT REMAINS SLOW TO PROCESS AND RESPOND  ALARM ON FOR SAFETY   Activity Tolerance   Activity Tolerance Patient limited by fatigue   Medical Staff Made Aware PT   Assessment   Assessment PATIENT SEEN FOR OT TREATMENT SESSION WITH FOCUS ON BED MOBILITY, LIGHT GROOMING, AND OVERALL ACTIVITY TOLERANCE  PATIENT REMAINS APPROPRIATE FOR COULD TREAT WITH PHYSICAL THERAPY SECONDARY TO POOR ENDURANCE, ASSIST X2 LEVEL, AND EASILY FATIGUING  PATIENT IS PLEASANT, AGREEABLE, MOTIVATED TO PARTICIPATE IN SESSION  THIS OTR ASSISTED WITH RIGHT LOWER EXTREMITY MANAGEMENT IN ORDER FOR STUMP WRAPPING TO BE COMPLETED  PATIENT STATING" THIS FEELS SO MUCH BETTER"  PATIENT COMPLETED SUPINE<-> SIT TRANSFER WITH MAX A X2  PATIENT ABLE TO SIT EDGE OF BED FOR APPROXIMATELY 7 MINUTES MOD-MAX A FOR COMPLETION OF LIGHT GROOMING TASK  PATIENT CONTINUES TO REQUIRE MAX A FOR GROOMING  PATIENT RETURNED TO SUPINE SECONDARY TO FATIGUE AND NOTED TO BE INCONTINENT OF BOWEL  PATIENT ABLE TO ROLL L/R WITH MAX A AND REQUIRED TOTAL ASSIST FOR PERIANAL HYGIENE  PATIENT LEFT RESTING COMFORTABLY WITH ALL NEEDS IN REACH AND BED ALARM ON  PATIENT IS PROGRESSING HOWEVER REMAINS SIGNIFICANTLY LIMITED  CONTINUE TO RECOMMEND INPATIENT REHAB UPON DISCHARGE  WILL CONTINUE TO FOLLOW TO ADDRESS THE PREVIOUSLY ESTABLISHED OT GOALS  Plan   Treatment Interventions ADL retraining;Functional transfer training; Endurance training;Cognitive reorientation;Patient/family training;Equipment evaluation/education; Compensatory technique education; Energy conservation; Activityengagement   Goal Expiration Date 02/10/21   OT Treatment Day 3   OT Frequency 2-3x/wk   Recommendation   OT Discharge Recommendation Post-Acute Rehabilitation Services   OT - OK to Discharge Yes   AM-PAC Daily Activity Inpatient   Lower Body Dressing 1   Bathing 1   Toileting 1   Upper Body Dressing 1   Grooming 2   Eating 2   Daily Activity Raw Score 8   Turning Head Towards Sound 4   Follow Simple Instructions 3   Low Function Daily Activity Raw Score 15   Low Function Daily Activity Standardized Score 26 28   AM-PAC Applied Cognition Inpatient   Following a Speech/Presentation 2   Understanding Ordinary Conversation 3   Taking Medications 3   Remembering Where Things Are Placed or Put Away 3   Remembering List of 4-5 Errands 2   Taking Care of Complicated Tasks 2   Applied Cognition Raw Score 15   Applied Cognition Standardized Score 33 54   Modified [place] Scale   Modified [place] Scale 4       Documentation completed by ELDER Almeida, OTR/L

## 2021-02-03 NOTE — ORTHOTIC NOTE
Orthotic Note            Date: 2/3/2021      Patient Name: Amy Aj            Reason for Consult:  Patient Active Problem List   Diagnosis    Chronic hepatitis C without hepatic coma ([place] [address] )    HTN (hypertension)    Hyperlipidemia associated with type 2 diabetes mellitus ([place] [address] )    Methadone maintenance therapy patient ([place] [address] )    Type 2 diabetes mellitus with diabetic polyneuropathy ([place] [address] )    Callous ulcer, limited to breakdown of skin ([place] [address] )    Bilateral lower extremity edema    BMI 28 0-28 9,adult    Positive depression screening    Mononeuropathy, unspecified    Other and unspecified noninfectious gastroenteritis and colitis    Mixed hyperlipidemia    Proteinuria    Tobacco abuse counseling    Vitamin D deficiency    SOB (shortness of breath) on exertion    DM2 (diabetes mellitus, type 2) ([place] [address] )    Acute metabolic encephalopathy    Acute respiratory failure with hypoxia ([place] [address] )    Sepsis due to COVID-19 ([place] [address] )    Pneumonia due to COVID-19 virus    Acute pancreatitis    Hematuria    Aortic thrombus (HCC)    Anemia    Hypokalemia    Myopathy    Adrenal insufficiency (HCC)    Vocal cord dysfunction   Procare Multipodous Boot     [employer] delivered and fit a Procare Multipodus Boot onto pt's LLR while supine in bed  Posterior kickstand left at neutral position  Pt tolerated fitting well  RN aware and will continue to follow up as needed  Recommendations:  Please call [employer] ext [phone] in regards to any bracing instructions and/or adjustments       [address] [facility] Restorative Technician, BS

## 2021-02-03 NOTE — PROGRESS NOTES
02/03/21 1500   Clinical Encounter Type   Visited With Patient   Jew Encounters   Jew Needs Prayer   Sacramental Encounters   Sacrament of Sick-Anointing Anointed

## 2021-02-03 NOTE — PLAN OF CARE
Problem: OCCUPATIONAL THERAPY ADULT  Goal: Performs self-care activities at highest level of function for planned discharge setting  See evaluation for individualized goals  Description: Treatment Interventions: ADL retraining, Functional transfer training, Endurance training, Cognitive reorientation, Patient/family training, Equipment evaluation/education, Compensatory technique education, Energy conservation, Activityengagement          See flowsheet documentation for full assessment, interventions and recommendations  Outcome: Progressing  Note: Limitation: Decreased ADL status, Decreased Safe judgement during ADL, Decreased UE strength, Decreased UE ROM, Decreased cognition, Decreased endurance, Decreased self-care trans, Decreased high-level ADLs, Decreased sensation, Decreased fine motor control, Visual deficit  Prognosis: Fair  Assessment: PATIENT SEEN FOR OT TREATMENT SESSION WITH FOCUS ON BED MOBILITY, LIGHT GROOMING, AND OVERALL ACTIVITY TOLERANCE  PATIENT REMAINS APPROPRIATE FOR COULD TREAT WITH PHYSICAL THERAPY SECONDARY TO POOR ENDURANCE, ASSIST X2 LEVEL, AND EASILY FATIGUING  PATIENT IS PLEASANT, AGREEABLE, MOTIVATED TO PARTICIPATE IN SESSION  THIS OTR ASSISTED WITH RIGHT LOWER EXTREMITY MANAGEMENT IN ORDER FOR STUMP WRAPPING TO BE COMPLETED  PATIENT STATING" THIS FEELS SO MUCH BETTER"  PATIENT COMPLETED SUPINE<-> SIT TRANSFER WITH MAX A X2  PATIENT ABLE TO SIT EDGE OF BED FOR APPROXIMATELY 7 MINUTES MOD-MAX A FOR COMPLETION OF LIGHT GROOMING TASK  PATIENT CONTINUES TO REQUIRE MAX A FOR GROOMING  PATIENT RETURNED TO SUPINE SECONDARY TO FATIGUE AND NOTED TO BE INCONTINENT OF BOWEL  PATIENT ABLE TO ROLL L/R WITH MAX A AND REQUIRED TOTAL ASSIST FOR PERIANAL HYGIENE  PATIENT LEFT RESTING COMFORTABLY WITH ALL NEEDS IN REACH AND BED ALARM ON  PATIENT IS PROGRESSING HOWEVER REMAINS SIGNIFICANTLY LIMITED  CONTINUE TO RECOMMEND INPATIENT REHAB UPON DISCHARGE    WILL CONTINUE TO FOLLOW TO ADDRESS THE PREVIOUSLY ESTABLISHED OT GOALS  OT Discharge Recommendation: Post-Acute Rehabilitation Services  OT - OK to Discharge:  Yes

## 2021-02-03 NOTE — CASE MANAGEMENT
Cm reached out to pt's nephew to discuss rehab choices  No answer, voice message left  Cm will continue to follow

## 2021-02-03 NOTE — PROGRESS NOTES
PHYSICAL MEDICINE AND REHABILITATION CONSULT NOTE  Jamie Horowitz 56 y o  male MRN: [id number]  Unit/Bed#: -01 Encounter: [id number]    Requested by (Physician/Service): David Luna DO  Reason for Consultation:  Assessment of rehabilitation needs    Assessment:  Rehabilitation Diagnosis:    Critical illness myopathy    Right transfemoral amputation    Pneumonia due to COVID 19   Left foot drop   Impaired mobility and self care    Recommendations:  Rehabilitation Plan:   Continue PT/OT (SLP) while on acute care   Multipodus boot to the left foot   Agree with built up utensils/tools for grooming/eating   If patient tolerance continues to improve he may be a candidate for acute inpatient rehabilitation once medically stable  Will continue to follow functional and medical progress   Covid-19 Testing: [facility] rehabilitation units require testing within 48 hours of potential admission for all general admissions  Please re-test if needed  *Re-testing is NOT required for patients recovering from COVID-19 infection if isolation has been discontinued per CDC criteria  Medical Co-morbidities Plan:  · Right Transfemoral amputation  · Pneumonia due to COVID-19 virus  · Sepsis  · Hypernatremia  · Diabetes with DKA  · Metabolic acidosis  · Aortic thrombus leading to need for transfemoral amputation  · Acute kidney injury  · transaminitis  · Pulmonary embolism    · Hematuria post cystoscopy  · Acute respiratory failure with hypoxia, resolving   · Anemia   · Sacral wound, DTI  · Right 2nd finger DTI  · DVT ppx:  lovenox and SCD    Thank you for this consultation  Do not hesitate to contact service with further questions  VERONICA Gama  PM&R    History of Present Illness:  Jamie Horowitz is a 56 y o  male who has had an extensive hospital stay due to [place] 19 infection testing positive on 12/31/20    His hospital course was complicated by respiratory failure with prolonged intubation, DKA, aortic thrombus and right ischemic limb s/p right AKA and vocal cord dysfunction  He required extensive stay in the MICU  He was extubated on 1/25/21 and had required tube feedings however has passed a swallow evaluation and began an oral diet  NG tube has been removed  He is currently on 2 liters of nasal cannula  He has some pain in his right arm  He feels generally weak all over however feels weaker on his right  He feels he has improved over the past few days  PM&R continue to follow for rehabilitation recommendations  Review of Systems: 10 point ROS negative except for what is noted in HPI    Function:  Prior level of function and living situation:  The patient lives with his nephew, brothers and sisters  He reports having 1st floor set up, there is 1 step up to his bedroom  He reports that his family will be able to assist him as needed  He was independent and working full time  Current level of function:  Physical Therapy: Moderate assist for bed mobility, dependent for transfers  Occupational Therapy:  Maximal assist for grooming, total assist for LB dressing  Speech Therapy:  Puree with nectar thick to begin       Physical Exam:  BP 99/51   Pulse 77   Temp (!) 97 °F (36 1 °C) (Oral)   Resp 20   Ht 5' 6" (1 676 m)   Wt 74 1 kg (163 lb 6 4 oz)   SpO2 98%   BMI 26 37 kg/m²        Intake/Output Summary (Last 24 hours) at 2/3/2021 1104  Last data filed at 2/2/2021 2309  Gross per 24 hour   Intake 716 ml   Output 687 ml   Net 29 ml       Body mass index is 26 37 kg/m²  Physical Exam  HENT:      Head: Normocephalic  Mouth/Throat:      Mouth: Mucous membranes are dry  Eyes:      Extraocular Movements: Extraocular movements intact  Pulmonary:      Effort: Pulmonary effort is normal       Comments: + cough   Abdominal:      General: There is no distension  Musculoskeletal:         General: Deformity present        Comments: Right AKA with stables, no drainage or erythema noted  RUE: SAB 1/5, EE 2+/5, EF 3-/5, FF/FE 1/5  LUE: SAB 2+/5, EE/EF 3/5, FF/FE 3-/5  LLE: HF 2-/5, KF 2+/5, KE 3-/5, DF/PF 0/5   Neurological:      Mental Status: He is alert and oriented to person, place, and time     Psychiatric:         Mood and Affect: Mood normal         Social History:    Social History     Socioeconomic History    Marital status:      Spouse name: Not on file    Number of children: Not on file    Years of education: Not on file    Highest education level: Not on file   Occupational History    Not on file   Social Needs    Financial resource strain: Not on file    Food insecurity     Worry: Not on file     Inability: Not on file    Transportation needs     Medical: Not on file     Non-medical: Not on file   Tobacco Use    Smoking status: Former Smoker    Smokeless tobacco: Never Used   Substance and Sexual Activity    Alcohol use: No    Drug use: No     Comment: methadone    Sexual activity: Not on file   Lifestyle    Physical activity     Days per week: Not on file     Minutes per session: Not on file    Stress: Not on file   Relationships    Social connections     Talks on phone: Not on file     Gets together: Not on file     Attends Yarsanism service: Not on file     Active member of club or organization: Not on file     Attends meetings of clubs or organizations: Not on file     Relationship status: Not on file    Intimate partner violence     Fear of current or ex partner: Not on file     Emotionally abused: Not on file     Physically abused: Not on file     Forced sexual activity: Not on file   Other Topics Concern    Not on file   Social History Narrative    Not on file        Family History:    Family History   Problem Relation Age of Onset    Diabetes Mother     Hypertension Father     Leukemia Father     Acute lymphoblastic leukemia Father     Cancer Sister          Medications:     Current Facility-Administered Medications:    acetaminophen (TYLENOL) tablet 650 mg, 650 mg, Oral, Q6H PRN, Shannan KELLY Sedora, CRNP, 650 mg at 02/01/21 1442    bisacodyl (DULCOLAX) rectal suppository 10 mg, 10 mg, Rectal, Daily PRN, Shannan A Sedora, CRNP, 10 mg at 01/21/21 2345    cholecalciferol (VITAMIN D3) tablet 2,000 Units, 2,000 Units, Oral, Daily, Shannan Cadeora, CRNP, 2,000 Units at 02/03/21 0907    dextrose 50 % IV solution **ADS Override Pull**, , , ,     Diclofenac Sodium (VOLTAREN) 1 % topical gel 4 g, 4 g, Topical, 4x Daily, Shannan MENDOZA Sedora, CRNP, 4 g at 02/03/21 0920    enoxaparin (LOVENOX) subcutaneous injection 80 mg, 1 mg/kg, Subcutaneous, Q12H [address], Shannan A Sedora, CRNP, 80 mg at 02/02/21 2313    gabapentin (NEURONTIN) capsule 400 mg, 400 mg, Oral, TID, Shannan A Sedora, CRNP, 400 mg at 02/03/21 0907    glycopyrrolate (ROBINUL) tablet 1 mg, 1 mg, Oral, TID, Shannan A Sedora, CRNP, 1 mg at 02/02/21 2234    HYDROmorphone (DILAUDID) injection 0 5 mg, 0 5 mg, Intravenous, Q3H PRN, Shannan A Sedora, CRNP, 0 5 mg at 02/01/21 2250    HYDROmorphone (DILAUDID) injection 0 5 mg, 0 5 mg, Intravenous, Q3H PRN, Shannan A Sedora, CRNP, 0 5 mg at 02/02/21 1248    insulin lispro (HumaLOG) 100 units/mL subcutaneous injection 1-6 Units, 1-6 Units, Subcutaneous, TID With Meals **AND** Fingerstick Glucose (POCT), , , Q6H, Dwigth Zabala,     insulin regular (HumuLIN R,NovoLIN R) injection 12 Units, 12 Units, Subcutaneous, 4x Daily, Yoel Goodman MD, 12 Units at 02/03/21 0918    levalbuterol (XOPENEX) inhalation solution 1 25 mg, 1 25 mg, Nebulization, TID, VERONICA Kennedy, 1 25 mg at 02/03/21 0808    Lidocaine Viscous HCl (XYLOCAINE) 2 % mucosal solution 15 mL, 15 mL, Swish & Spit, 4x Daily PRN, VERONICA Kennedy, 15 mL at 01/27/21 0800    lisinopril (ZESTRIL) tablet 10 mg, 10 mg, Oral, Daily, VERONICA Kennedy, 10 mg at 02/02/21 0857    menthol-methyl salicylate (BENGAY) 64-78 % cream, , Apply externally, 4x Daily PRN, Karly Beltran, STONENP, Given at 02/01/21 0810    methadone (DOLOPHINE) oral concentrated solution 23 mg, 23 mg, Per PEG Tube, Q8H [address], Shannan A Sedora, CRNP, 23 mg at 02/03/21 0555    moisture barrier miconazole 2% cream (aka SAMREEN MOISTURE BARRIER ANTIFUNGAL CREAM), 1 application, Topical, TID, Sonia Mccall DO, 1 application at 70/68/81 2238    [COMPLETED] zinc sulfate (ZINCATE) capsule 220 mg, 220 mg, Oral, Daily, 220 mg at 01/18/21 0800 **FOLLOWED BY** multivitamin-minerals (CENTRUM ADULTS) tablet 1 tablet, 1 tablet, Oral, Daily, Shannan A Sedora, CRNP, 1 tablet at 02/03/21 0907    omeprazole (PRILOSEC) suspension 2 mg/mL, 20 mg, Oral, Early Morning, Shannan A Sedora, CRNP, 20 mg at 02/03/21 0601    phenol (CHLORASEPTIC) 1 4 % mucosal liquid 1 spray, 1 spray, Mouth/Throat, Q2H PRN, Shannan A Sedora, CRNP, 1 spray at 02/01/21 2025    polyethylene glycol (MIRALAX) packet 17 g, 17 g, Oral, Daily, Shannan A Sedora, CRNP, 17 g at 02/02/21 0857    senna (SENOKOT) tablet 8 6 mg, 1 tablet, Oral, BID, Shannan A Sedora, CRNP, 8 6 mg at 02/03/21 0907    sodium chloride (OCEAN) 0 65 % nasal spray 1 spray, 1 spray, Each Nare, Q1H PRN, David Islas DO    sodium chloride 0 9 % inhalation solution 3 mL, 3 mL, Nebulization, TID, Shannan A Sedora, CRNP, 3 mL at 02/03/21 0145    Past Medical History:     Past Medical History:   Diagnosis Date    [facility]     GERD (gastroesophageal reflux disease)     Hypercholesteremia     Hypertension     Methadone use ([facility] )         Past Surgical History:     Past Surgical History:   Procedure Laterality Date    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    Procedure: AMPUTATION ABOVE KNEE (AKA);   Surgeon: Pat Panchal MD;  [facility];  Service: Vascular    AMPUTATION ABOVE KNEE (AKA) Right 1/18/2021    Procedure: AMPUTATION ABOVE KNEE (AKA) FORMALIZATION,  R AKA wound washout, wound closure;  Surgeon: Pat Panchal, MD;  [facility];  Service: Vascular    ARTERIOGRAM Right 1/13/2021    Procedure: ARTERIOGRAM;  Surgeon: Jerod Orlando DO;  [facility];  Service: Vascular    IR LOWER EXTREMITY ANGIOGRAM  1/14/2021    THROMBECTOMY W/ EMBOLECTOMY Right 1/13/2021    Procedure: EMBOLECTOMY/THROMBECTOMY LOWER EXTREMITY;  Surgeon: Jerod Orlando DO;  [facility];  Service: Vascular         Allergies: Allergies   Allergen Reactions    Varenicline            LABORATORY RESULTS:      Lab Results   Component Value Date    HGB 9 3 (L) 02/03/2021    HCT 30 4 (L) 02/03/2021    WBC 8 04 02/03/2021     Lab Results   Component Value Date    BUN 14 02/03/2021    K 3 0 (L) 02/03/2021     02/03/2021    GLUCOSE 179 (H) 01/13/2021    CREATININE 0 39 (L) 02/03/2021     Lab Results   Component Value Date    PROTIME 17 5 (H) 01/14/2021    INR 1 44 (H) 01/14/2021        DIAGNOSTIC STUDIES: Reviewed  Xr Chest Portable    Result Date: 1/13/2021  Impression: 1  Interim slight improvement in bilateral interstitial and alveolar groundglass opacification is noted consistent with Covid 19 pneumonia with possible interim improvement of superimposed edema  2   Lines and tubes as noted  Workstation performed: [id number]     Xr Chest Portable Icu    Result Date: 1/15/2021  Impression: 1  Stable opacities 2    Stable lines and tubes Workstation performed: [id number]

## 2021-02-03 NOTE — SPEECH THERAPY NOTE
Speech Language/Pathology    Speech/Language Pathology Progress Note    Patient Name: Jamie Horowitz  EMJUANPABLOBPB Date: 2/3/2021       Subjective:  "oh I feel so much better"  Current diet:    Objective:  Pt seen for ongoing trials of soft solids, thin by straw & nt by straw  Pt w/ adequate mastication of small broken off pieces- slow manipulation w/ slow transfers & mild lingual retention  Offered sips to clear w/ nt & the thin at times; pt w/ fairly prompt swallows, mult swallows for all trials  Offered pt the thin by straw in small controlled sips, able to complete  The pt did take a larger sip & delayed wet coughing was noted  Cued to go back to smaller, w/ a 2* swallow  Voice has improved mildly, still hoarse  Assessment:  Pt tolerating very controlled sips of thin w/ slp, noted cough w/ large uncontrolled sips  Improved manipulation of soft solids  Plan/Recommendations:   For now continue the puree/nt  Will continue w/ trials of level 2 w/t hin

## 2021-02-03 NOTE — PROGRESS NOTES
Inpatient follow-up progress note        Assessment:  56 yr male with Type 2 diabetes, HTN, HLD had a recent prolonged ICU stay probably 2" COVID 19 related complications including  Aortic thrombus requiring Rt AKA and pneumonitis requiring stress dose steroids      PLAN:     1  Type 2 diabetes  He is uncontrolled with recent A1c 11 6%  He has known insulin resistance requiring nearly 200u insulin/day  He was on Janumet, Jardiance and  as outpt      Overnight, he removed NGT which will not be replaced  Note hypoglycemia overnight in 50's  Fingerstick glucose was 204mg/dL at 11am after 12u  at 920am      Plan:  DC  insulin 12u Q6hr  Start Lantus 8u bedtime and humalog 3u tidac plus correction  Monitor fingersticks and titrate  Will need close outpt follow up with endocrinology  Follows with Dr Sorin Roth      2  Adrenal insufficiency  AM cortisol 17 8ug/mL  This is normal, patient does not have adrenal insufficiency  No further testing needed  S:  Pulled out NGT last night      O:  Patient seen and examined personally  BP 99/51   Pulse 77   Temp (!) 97 °F (36 1 °C) (Oral)   Resp 20   Ht 5' 6" (1 676 m)   Wt 74 1 kg (163 lb 6 4 oz)   SpO2 98%   BMI 26 37 kg/m²     Physical Exam  Constitutional:       General: He is not in acute distress  Appearance: He is well-developed  HENT:      Head: Normocephalic and atraumatic  Nose: Nose normal    Eyes:      Conjunctiva/sclera: Conjunctivae normal    Neck:      Musculoskeletal: Normal range of motion and neck supple  Pulmonary:      Effort: Pulmonary effort is normal    Abdominal:      General: There is no distension  Skin:     Findings: No rash  Comments: No icterus   Neurological:      Mental Status: He is alert and oriented to person, place, and time           Current Facility-Administered Medications   Medication Dose Route Frequency Provider Last Rate    acetaminophen  650 mg Oral Q6H PRN VERONICA Smith      bisacodyl  10 mg Rectal Daily PRN Willy Todd, CRNP      cholecalciferol  2,000 Units Oral Daily Shannan A Sedora, CRNP      dextrose           Diclofenac Sodium  4 g Topical 4x Daily Shannan A Sedora, CRNP      enoxaparin  1 mg/kg Subcutaneous Q12H [address] Shannan A Sedora, CRNP      gabapentin  400 mg Oral TID Shannan A Sedora, CRNP      glycopyrrolate  1 mg Oral TID Willy Todd, VERONICA      HYDROmorphone  0 5 mg Intravenous Q3H PRN Shannan A Sedora, CRNP      HYDROmorphone  0 5 mg Intravenous Q3H PRN Shannan A Sedora, CRNP      insulin lispro  1-6 Units Subcutaneous TID With Meals Dwight Zabala, DO      insulin regular  12 Units Subcutaneous 4x Daily Candido Rodrigez MD      levalbuterol  1 25 mg Nebulization TID Shannan A Sedora, CRNP      Lidocaine Viscous HCl  15 mL Swish & Spit 4x Daily PRN Shannan A Sedora, CRNP      lisinopril  10 mg Oral Daily Shannan A Sedora, CRNP      menthol-methyl salicylate   Apply externally 4x Daily PRN Willy Todd, VERONICA      methadone  23 mg Per PEG Tube Q8H [address] Shannan A Sedora, CRNP      SAMREEN ANTIFUNGAL  1 application Topical TID Sonia Ornelashta,       multivitamin-minerals  1 tablet Oral Daily Shannan A Sedora, CRNP      omeprazole (PRILOSEC) suspension 2 mg/mL  20 mg Oral Early Morning Shannan A Sedora, CRNP      phenol  1 spray Mouth/Throat Q2H PRN Shannan A Sedora, CRNP      polyethylene glycol  17 g Oral Daily Shannan A Sedora, CRNP      senna  1 tablet Oral BID Shannan A Sedora, CRNP      sodium chloride  1 spray Each Nare Q1H PRN Daniella Sarmiento,       sodium chloride  3 mL Nebulization TID Shannan A Sedora, VERONICA          Lab, Imaging and other studies:   POC Glucose (mg/dl)   Date Value   02/03/2021 146 (H)   02/03/2021 55 (L)   02/02/2021 95   02/02/2021 135   02/02/2021 253 (H)   02/02/2021 169 (H)   02/01/2021 179 (H)   02/01/2021 197 (H)   02/01/2021 195 (H)   02/01/2021 219 (H)

## 2021-02-03 NOTE — PROGRESS NOTES
Rounding done with Dr Meaghan Beckman, aware that patient is tolerating some PO pudding and nectar thick juices  Pills crushed in pudding given  Awaiting dinner tray  Tube feeds off, and patient NG tube capped at this time  Per Dr Meaghan Beckman- if pt removes NG again okay to leave out as long as tolerating PO

## 2021-02-03 NOTE — PHYSICAL THERAPY NOTE
PT TREATMENT       02/03/21 1346   Note Type   Note Type Treatment   Pain Assessment   Pain Assessment Tool FLACC   Pain Rating: FLACC (Rest) - Face 0   Pain Rating: FLACC (Rest) - Legs 0   Pain Rating: FLACC (Rest) - Activity 0   Pain Rating: FLACC (Rest) - Cry 0   Pain Rating: FLACC (Rest) - Consolability 0   Score: FLACC (Rest) 0   Pain Rating: FLACC (Activity) - Face 1   Pain Rating: FLACC (Activity) - Legs 0   Pain Rating: FLACC (Activity) - Activity 0   Pain Rating: FLACC (Activity) - Cry 1   Pain Rating: FLACC (Activity) - Consolability 0   Score: FLACC (Activity) 2   Restrictions/Precautions   Weight Bearing Precautions Per Order Yes   RLE Weight Bearing Per Order NWB  (S/P AKA)   Braces or Orthoses   (ACE BANDAGE APPLIED TO RESIDUAL LIMB PER REQUEST OF RN )   Other Precautions Pain; Fall Risk;Bed Alarm  (BED ALARM ACTIVE POST PT TREAT )   General   Chart Reviewed Yes   Response to Previous Treatment Patient with no complaints from previous session  Family/Caregiver Present No   Cognition   Arousal/Participation Alert; Cooperative   Subjective   Subjective "I WANT TO GET BETTER"   Bed Mobility   Rolling R 2  Maximal assistance   Additional items Assist x 1; Increased time required; Bedrails;LE management   Rolling L 2  Maximal assistance   Additional items Assist x 2; Increased time required;LE management; Bedrails   Supine to Sit 2  Maximal assistance   Additional items Assist x 2; Increased time required;LE management;Verbal cues   Sit to Supine 2  Maximal assistance   Additional items Assist x 2; Increased time required;Verbal cues;LE management   Balance   Static Sitting Poor -   Dynamic Sitting Poor   Endurance Deficit   Endurance Deficit Yes   Endurance Deficit Description GROSS WEAKNESS    Activity Tolerance   Activity Tolerance Patient limited by fatigue   Medical Staff Made Aware OT CHISE   Nurse Made Aware OMAR TO SEE PER RN KRISTIAN    Assessment   Prognosis Fair   Problem List Decreased strength;Decreased endurance; Impaired balance;Decreased mobility;Pain;Decreased skin integrity   Assessment PT INITIATED TREATMENT SESSION IN ORDER TO ASSIST PATIENT IN ACHIEVING GOALS TO IMPROVE ACTIVITY TOLERANCE AND SITTING BALANCE  PATIENT REMAINS LIMITED BY SIGNIFICANT B/L LE AND UE WEAKNESS  HE IS COOPERATIVE AND MOTIVATED  PATIENT INCONTINENT OF BOWEL AND REQUIRED MAX-AX1 TO PERFORM ROLLING IN BED FOR PERORMANCE OF CYNTHIA-HYGIENE CARE  HE REQUIRED MAX-AX2 FOR SUPINE-->SIT TRANSFER TOWARD L SIDE WITH USE OF BED PAD  SITTING EOB, PATIENT SAT STATICALLY WITH L UE ON HAND RAIL WITH MOD-AX1 X 5 MINUTES AND DYNAMICALLY (BRUSHING HAIR WITH L UE) MAX-AX1 FOR 2 MINUTES FOR TOTAL SITTING TIME OF 7 MINUTES  DURATION LIMITED BY FATIGUE  PATIENT RETURNED TO SUPINE MAX-AX2 AT END OF SESSION  PT D/C RECOMMENDATION REMAINS FOR REHAB  PATIENT WILL BENEFIT FROM CONTINUED SKILLED PT THIS ADMISSION TO ACHIEVE MAXIMAL FUNCTION AND SAFETY  Goals   Patient Goals "TO GET BETTER"   STG Expiration Date 02/10/21   PT Treatment Day 3   Plan   Treatment/Interventions Functional transfer training;LE strengthening/ROM; Therapeutic exercise; Endurance training;Patient/family training;Equipment eval/education; Bed mobility;Gait training;OT;Spoke to nursing;Spoke to case management   Progress Slow progress, decreased activity tolerance   PT Frequency Other (Comment)  (3-5X/WK)   Recommendation   PT Discharge Recommendation Post-Acute Rehabilitation Services   Equipment Recommended Wheelchair   PT - OK to Discharge Yes  (TO REHAB WHEN MED OMAR )   Angeline Guillen [address]   Turning in Bed Without Bedrails 1   Lying on Back to Sitting on Edge of Flat Bed 1   Moving Bed to Chair 1   Standing Up From Chair 1   Walk in Room 1   Climb 3-5 Stairs 1   Basic Mobility Inpatient Raw Score 6     DEREK SARABIA PT, DPT

## 2021-02-03 NOTE — PLAN OF CARE
Problem: PHYSICAL THERAPY ADULT  Goal: Performs mobility at highest level of function for planned discharge setting  See evaluation for individualized goals  Description: Treatment/Interventions: Functional transfer training, LE strengthening/ROM, Therapeutic exercise, Endurance training, Bed mobility          See flowsheet documentation for full assessment, interventions and recommendations  Outcome: Progressing  Note: Prognosis: Fair  Problem List: Decreased strength, Decreased endurance, Impaired balance, Decreased mobility, Pain, Decreased skin integrity  Assessment: PT INITIATED TREATMENT SESSION IN ORDER TO ASSIST PATIENT IN ACHIEVING GOALS TO IMPROVE ACTIVITY TOLERANCE AND SITTING BALANCE  PATIENT REMAINS LIMITED BY SIGNIFICANT B/L LE AND UE WEAKNESS  HE IS COOPERATIVE AND MOTIVATED  PATIENT INCONTINENT OF BOWEL AND REQUIRED MAX-AX1 TO PERFORM ROLLING IN BED FOR PERORMANCE OF CYNTHIA-HYGIENE CARE  HE REQUIRED MAX-AX2 FOR SUPINE-->SIT TRANSFER TOWARD L SIDE WITH USE OF BED PAD  SITTING EOB, PATIENT SAT STATICALLY WITH L UE ON HAND RAIL WITH MOD-AX1 X 5 MINUTES AND DYNAMICALLY (BRUSHING HAIR WITH L UE) MAX-AX1 FOR 2 MINUTES FOR TOTAL SITTING TIME OF 7 MINUTES  DURATION LIMITED BY FATIGUE  PATIENT RETURNED TO SUPINE MAX-AX2 AT END OF SESSION  PT D/C RECOMMENDATION REMAINS FOR REHAB  PATIENT WILL BENEFIT FROM CONTINUED SKILLED PT THIS ADMISSION TO ACHIEVE MAXIMAL FUNCTION AND SAFETY  Barriers to Discharge: Inaccessible home environment, Decreased caregiver support     PT Discharge Recommendation: [address]     PT - OK to Discharge: Yes(TO REHAB WHEN MED OMAR )    See flowsheet documentation for full assessment

## 2021-02-04 PROBLEM — G89.29 CHRONIC PAIN: Status: ACTIVE | Noted: 2021-02-04

## 2021-02-04 LAB
ANION GAP SERPL CALCULATED.3IONS-SCNC: 2 MMOL/L (ref 4–13)
BUN SERPL-MCNC: 13 MG/DL (ref 5–25)
CALCIUM SERPL-MCNC: 8 MG/DL (ref 8.3–10.1)
CHLORIDE SERPL-SCNC: 102 MMOL/L (ref 100–108)
CO2 SERPL-SCNC: 33 MMOL/L (ref 21–32)
CREAT SERPL-MCNC: 0.49 MG/DL (ref 0.6–1.3)
ERYTHROCYTE [DISTWIDTH] IN BLOOD BY AUTOMATED COUNT: 16.9 % (ref 11.6–15.1)
GFR SERPL CREATININE-BSD FRML MDRD: 117 ML/MIN/1.73SQ M
GLUCOSE SERPL-MCNC: 106 MG/DL (ref 65–140)
GLUCOSE SERPL-MCNC: 141 MG/DL (ref 65–140)
GLUCOSE SERPL-MCNC: 167 MG/DL (ref 65–140)
GLUCOSE SERPL-MCNC: 204 MG/DL (ref 65–140)
GLUCOSE SERPL-MCNC: 205 MG/DL (ref 65–140)
GLUCOSE SERPL-MCNC: 234 MG/DL (ref 65–140)
HCT VFR BLD AUTO: 25.1 % (ref 36.5–49.3)
HEMOCCULT STL QL: NEGATIVE
HGB BLD-MCNC: 7.6 G/DL (ref 12–17)
MCH RBC QN AUTO: 29.5 PG (ref 26.8–34.3)
MCHC RBC AUTO-ENTMCNC: 30.3 G/DL (ref 31.4–37.4)
MCV RBC AUTO: 97 FL (ref 82–98)
PLATELET # BLD AUTO: 153 THOUSANDS/UL (ref 149–390)
PMV BLD AUTO: 10.6 FL (ref 8.9–12.7)
POTASSIUM SERPL-SCNC: 4.1 MMOL/L (ref 3.5–5.3)
RBC # BLD AUTO: 2.58 MILLION/UL (ref 3.88–5.62)
SODIUM SERPL-SCNC: 137 MMOL/L (ref 136–145)
WBC # BLD AUTO: 6.23 THOUSAND/UL (ref 4.31–10.16)

## 2021-02-04 PROCEDURE — 82272 OCCULT BLD FECES 1-3 TESTS: CPT | Performed by: INTERNAL MEDICINE

## 2021-02-04 PROCEDURE — 94760 N-INVAS EAR/PLS OXIMETRY 1: CPT

## 2021-02-04 PROCEDURE — 99233 SBSQ HOSP IP/OBS HIGH 50: CPT | Performed by: INTERNAL MEDICINE

## 2021-02-04 PROCEDURE — 85027 COMPLETE CBC AUTOMATED: CPT | Performed by: INTERNAL MEDICINE

## 2021-02-04 PROCEDURE — 82948 REAGENT STRIP/BLOOD GLUCOSE: CPT

## 2021-02-04 PROCEDURE — 94640 AIRWAY INHALATION TREATMENT: CPT

## 2021-02-04 PROCEDURE — 80048 BASIC METABOLIC PNL TOTAL CA: CPT | Performed by: INTERNAL MEDICINE

## 2021-02-04 RX ORDER — METHADONE HYDROCHLORIDE 10 MG/ML
23 CONCENTRATE ORAL EVERY 8 HOURS SCHEDULED
Status: DISCONTINUED | OUTPATIENT
Start: 2021-02-04 | End: 2021-02-08

## 2021-02-04 RX ADMIN — Medication 2000 UNITS: at 07:55

## 2021-02-04 RX ADMIN — INSULIN LISPRO 1 UNITS: 100 INJECTION, SOLUTION INTRAVENOUS; SUBCUTANEOUS at 08:02

## 2021-02-04 RX ADMIN — MICONAZOLE NITRATE 1 APPLICATION: 20 CREAM TOPICAL at 17:54

## 2021-02-04 RX ADMIN — ISODIUM CHLORIDE 3 ML: 0.03 SOLUTION RESPIRATORY (INHALATION) at 20:11

## 2021-02-04 RX ADMIN — HYDROMORPHONE HYDROCHLORIDE 0.5 MG: 1 INJECTION, SOLUTION INTRAMUSCULAR; INTRAVENOUS; SUBCUTANEOUS at 07:58

## 2021-02-04 RX ADMIN — DICLOFENAC SODIUM 4 G: 10 GEL TOPICAL at 09:00

## 2021-02-04 RX ADMIN — DICLOFENAC SODIUM 4 G: 10 GEL TOPICAL at 21:14

## 2021-02-04 RX ADMIN — GABAPENTIN 400 MG: 400 CAPSULE ORAL at 07:55

## 2021-02-04 RX ADMIN — LEVALBUTEROL HYDROCHLORIDE 1.25 MG: 1.25 SOLUTION, CONCENTRATE RESPIRATORY (INHALATION) at 13:45

## 2021-02-04 RX ADMIN — Medication 1 TABLET: at 07:56

## 2021-02-04 RX ADMIN — POTASSIUM CHLORIDE 40 MEQ: 1500 TABLET, EXTENDED RELEASE ORAL at 00:36

## 2021-02-04 RX ADMIN — METHADONE HYDROCHLORIDE 23 MG: 10 CONCENTRATE ORAL at 05:06

## 2021-02-04 RX ADMIN — METHADONE HYDROCHLORIDE 23 MG: 10 CONCENTRATE ORAL at 21:11

## 2021-02-04 RX ADMIN — HYDROMORPHONE HYDROCHLORIDE 0.5 MG: 1 INJECTION, SOLUTION INTRAMUSCULAR; INTRAVENOUS; SUBCUTANEOUS at 15:20

## 2021-02-04 RX ADMIN — GABAPENTIN 400 MG: 400 CAPSULE ORAL at 21:14

## 2021-02-04 RX ADMIN — Medication 20 MG: at 05:06

## 2021-02-04 RX ADMIN — POLYETHYLENE GLYCOL 3350 17 G: 17 POWDER, FOR SOLUTION ORAL at 09:00

## 2021-02-04 RX ADMIN — LISINOPRIL 10 MG: 10 TABLET ORAL at 08:03

## 2021-02-04 RX ADMIN — DICLOFENAC SODIUM 4 G: 10 GEL TOPICAL at 12:05

## 2021-02-04 RX ADMIN — METHADONE HYDROCHLORIDE 23 MG: 10 CONCENTRATE ORAL at 13:40

## 2021-02-04 RX ADMIN — HYDROMORPHONE HYDROCHLORIDE 0.5 MG: 1 INJECTION, SOLUTION INTRAMUSCULAR; INTRAVENOUS; SUBCUTANEOUS at 22:32

## 2021-02-04 RX ADMIN — INSULIN LISPRO 10 UNITS: 100 INJECTION, SOLUTION INTRAVENOUS; SUBCUTANEOUS at 13:33

## 2021-02-04 RX ADMIN — GLYCOPYRROLATE 1 MG: 1 TABLET ORAL at 08:04

## 2021-02-04 RX ADMIN — INSULIN LISPRO 10 UNITS: 100 INJECTION, SOLUTION INTRAVENOUS; SUBCUTANEOUS at 08:02

## 2021-02-04 RX ADMIN — INSULIN GLARGINE 20 UNITS: 100 INJECTION, SOLUTION SUBCUTANEOUS at 21:14

## 2021-02-04 RX ADMIN — GLYCOPYRROLATE 1 MG: 1 TABLET ORAL at 21:14

## 2021-02-04 RX ADMIN — MICONAZOLE NITRATE 1 APPLICATION: 20 CREAM TOPICAL at 09:00

## 2021-02-04 RX ADMIN — LEVALBUTEROL HYDROCHLORIDE 1.25 MG: 1.25 SOLUTION, CONCENTRATE RESPIRATORY (INHALATION) at 07:54

## 2021-02-04 RX ADMIN — DICLOFENAC SODIUM 4 G: 10 GEL TOPICAL at 17:55

## 2021-02-04 RX ADMIN — ENOXAPARIN SODIUM 80 MG: 80 INJECTION SUBCUTANEOUS at 12:00

## 2021-02-04 RX ADMIN — INSULIN LISPRO 10 UNITS: 100 INJECTION, SOLUTION INTRAVENOUS; SUBCUTANEOUS at 21:11

## 2021-02-04 RX ADMIN — GABAPENTIN 400 MG: 400 CAPSULE ORAL at 17:54

## 2021-02-04 RX ADMIN — ACETAMINOPHEN 650 MG: 325 TABLET, FILM COATED ORAL at 18:13

## 2021-02-04 RX ADMIN — LEVALBUTEROL HYDROCHLORIDE 1.25 MG: 1.25 SOLUTION, CONCENTRATE RESPIRATORY (INHALATION) at 20:11

## 2021-02-04 RX ADMIN — ENOXAPARIN SODIUM 80 MG: 80 INJECTION SUBCUTANEOUS at 23:58

## 2021-02-04 RX ADMIN — ISODIUM CHLORIDE 3 ML: 0.03 SOLUTION RESPIRATORY (INHALATION) at 07:54

## 2021-02-04 RX ADMIN — SENNOSIDES 8.6 MG: 8.6 TABLET, FILM COATED ORAL at 07:56

## 2021-02-04 RX ADMIN — MICONAZOLE NITRATE 1 APPLICATION: 20 CREAM TOPICAL at 21:15

## 2021-02-04 RX ADMIN — ISODIUM CHLORIDE 3 ML: 0.03 SOLUTION RESPIRATORY (INHALATION) at 13:45

## 2021-02-04 RX ADMIN — GLYCOPYRROLATE 1 MG: 1 TABLET ORAL at 17:55

## 2021-02-04 NOTE — ASSESSMENT & PLAN NOTE
Tested positive for covid 12/31/2020  S/p completion of covid tx  S/p completion of IV abx for possible superimposed bacterial pna  Resp status stable, on 2l NC  Can be taken off isolation

## 2021-02-04 NOTE — PROGRESS NOTES
----- Message from Aaron Harvey sent at 9/27/2017 11:27 AM CDT -----  Contact: self  Pt would like to know if her lab orders have been sent to [facility] [address]. Please contact her at when the labs are sent at [phone].    Thanks    Progress Note Renny Stevens 1960, 56 y o  male MRN: [id number]    Unit/Bed#: -01 Encounter: [id number]    Primary Care Provider: VERONICA Gregorio   Date and time admitted to hospital: 1/13/2021  2:28 PM        * Pneumonia due to COVID-19 virus  Assessment & Plan  Tested positive for covid 12/31/2020  S/p completion of covid tx  S/p completion of IV abx for possible superimposed bacterial pna  Resp status stable, on 2l NC  Considered recovered    Vocal cord dysfunction  Assessment & Plan  · Was extubated 1/25  · He had been getting NG tube feeds  · S/p swallow eval now on PO diet, dysphagia diet and tolerating  NGT removed  · Cont robinul        Sepsis due to COVID-19 [facility])  Assessment & Plan  · Resolved  · BC x 2 NGTD  · Completed Vanco and Cefepime, covid tx  · WBC normalized, afebrile    DM2 (diabetes mellitus, type 2) [facility])  Assessment & Plan  Lab Results   Component Value Date    HGBA1C 11 6 (H) 12/09/2020       Recent Labs     02/03/21  1820 02/03/21  2111 02/04/21  0607 02/04/21  1202   POCGLU 203* 175* 167* 106       Blood Sugar Average: Last 72 hrs:  (P) 168 [id number]     Controlled  Endocrinology following w/ adjustment of insulin regimen    Acute respiratory failure with hypoxia (HCC)  Assessment & Plan  · Secondary to COVID 19  · Extubated 1/25  · Weaned down to 2L, POX stable      Anemia  Assessment & Plan  · Secondary to critical illness, no sign of acute blood loss  · Check stool occult  · Continue to monitor, transfuse if < 7   Type and screen for tomorrow am just in case    Aortic thrombus [facility])  Assessment & Plan  Caused compromise of right femoral artery  Required AKA on 1/18/21  On lovenox      Myopathy  Assessment & Plan  · Due to critical illness and steroid use  · Will need rehab    Adrenal insufficiency ([facility] [address] )  Assessment & Plan  · Ruled out per endocrine  · Hydrocortisone discontinued    BMI 28 0-28 9,adult  Assessment & Plan  Patient presenting with malnutrition  S/p nutrition assessment  Now on PO feeds with good intake per RN and med assistant      Methadone maintenance therapy patient [facility])  Assessment & Plan  Chronic methadone use    HTN (hypertension)  Assessment & Plan  · Continue lisinopril    VTE Pharmacologic Prophylaxis:   Pharmacologic: Enoxaparin (Lovenox)  Mechanical VTE Prophylaxis in Place: No    Patient Centered Rounds: I have performed bedside rounds with nursing staff today  Discussions with Specialists or Other Care Team Provider:     Education and Discussions with Family / Patient: Patient    Time Spent for Care: 30 minutes  More than 50% of total time spent on counseling and coordination of care as described above  Current Length of Stay: 22 day(s)    Current Patient Status: Inpatient   Certification Statement: The patient will continue to require additional inpatient hospital stay due to placement, monitor of h/h    Discharge Plan: to snf    Code Status: Level 1 - Full Code      Subjective:   Actually more talkative and interactive today  Has been tolerated dyphagia diet per RN with good intake  No acute events overnight    Objective:     Vitals:   Temp (24hrs), Av 2 °F (37 3 °C), Min:99 °F (37 2 °C), Max:99 6 °F (37 6 °C)    Temp:  [99 °F (37 2 °C)-99 6 °F (37 6 °C)] 99 °F (37 2 °C)  HR:  [] 100  Resp:  [16-24] 19  BP: (119-142)/(56-83) 142/78  SpO2:  [90 %-100 %] 95 %  Body mass index is 26 22 kg/m²  Input and Output Summary (last 24 hours): Intake/Output Summary (Last 24 hours) at 2021 1817  Last data filed at 2021 1501  Gross per 24 hour   Intake 50 ml   Output 379 ml   Net -329 ml       Physical Exam:     Physical Exam  Neck:      Musculoskeletal: Normal range of motion and neck supple  Cardiovascular:      Rate and Rhythm: Normal rate and regular rhythm  Pulses: Normal pulses  Heart sounds: Normal heart sounds  Pulmonary:      Effort: Pulmonary effort is normal  No respiratory distress        Breath sounds: Normal breath sounds  No wheezing or rales  Abdominal:      General: Abdomen is flat  Bowel sounds are normal  There is no distension  Tenderness: There is no abdominal tenderness  There is no guarding  Musculoskeletal:      Comments: R AKA   Neurological:      Mental Status: He is alert  Additional Data:     Labs:    Results from last 7 days   Lab Units 02/04/21  0428 02/03/21  0614   WBC Thousand/uL 6 23 8 04   HEMOGLOBIN g/dL 7 6* 9 3*   HEMATOCRIT % 25 1* 30 4*   PLATELETS Thousands/uL 153 147*   NEUTROS PCT %  --  81*   LYMPHS PCT %  --  8*   MONOS PCT %  --  8   EOS PCT %  --  2     Results from last 7 days   Lab Units 02/04/21  0428   SODIUM mmol/L 137   POTASSIUM mmol/L 4 1   CHLORIDE mmol/L 102   CO2 mmol/L 33*   BUN mg/dL 13   CREATININE mg/dL 0 49*   ANION GAP mmol/L 2*   CALCIUM mg/dL 8 0*   GLUCOSE RANDOM mg/dL 204*         Results from last 7 days   Lab Units 02/04/21  1202 02/04/21  0607 02/03/21  2111 02/03/21  1820 02/03/21  1259 02/03/21  1102 02/03/21  0650 02/03/21  0618 02/02/21  2305 02/02/21  1716 02/02/21  1216 02/02/21  0642   POC GLUCOSE mg/dl 106 167* 175* 203* 177* 204* 146* 55* 95 135 253* 169*                   * I Have Reviewed All Lab Data Listed Above  * Additional Pertinent Lab Tests Reviewed:  All Labs Within Last 24 Hours Reviewed    Imaging:    Imaging Reports Reviewed Today Include:   Imaging Personally Reviewed by Myself Includes:      Recent Cultures (last 7 days):           Last 24 Hours Medication List:   Current Facility-Administered Medications   Medication Dose Route Frequency Provider Last Rate    acetaminophen  650 mg Oral Q6H PRN Shannan A VERONICA Hopkins      bisacodyl  10 mg Rectal Daily PRN VERONICA Mclain      cholecalciferol  2,000 Units Oral Daily Shannan A Sedora, STONENP      Diclofenac Sodium  4 g Topical 4x Daily Shannan A TSONE HopkinsNP      enoxaparin  1 mg/kg Subcutaneous Q12H [address] Shannan A RayooraVERONICA      gabapentin 400 mg Oral TID Macario Downy, CRNP      glycopyrrolate  1 mg Oral TID Macario Downy, CRNP      HYDROmorphone  0 5 mg Intravenous Q3H PRN Shannan A Sedora, CRNP      insulin glargine  20 Units Subcutaneous HS Kelsea Escamilla MD      insulin lispro  1-6 Units Subcutaneous TID With Meals Hayder Rossi DO      insulin lispro  10 Units Subcutaneous TID With Meals Kelsea Escamilla MD      levalbuterol  1 25 mg Nebulization TID Shannan A Sedora, CRNP      Lidocaine Viscous HCl  15 mL Swish & Spit 4x Daily PRN Shannan A Sedora, CRNP      lisinopril  10 mg Oral Daily Shannan A Sedora, CRNP      menthol-methyl salicylate   Apply externally 4x Daily PRN Macario Downy, CRNP      methadone  23 mg Oral Q8H [facility] Hayder Rossi DO      SAMREEN ANTIFUNGAL  1 application Topical TID Sonia Mccall DO      multivitamin-minerals  1 tablet Oral Daily Shannan A Sedora, CRNP      omeprazole (PRILOSEC) suspension 2 mg/mL  20 mg Oral Early Morning Shannan A Sedora, CRNP      phenol  1 spray Mouth/Throat Q2H PRN Shannan A Sedora, CRNP      polyethylene glycol  17 g Oral Daily Shannan A Sedora, CRNP      senna  1 tablet Oral BID Shannan A Sedora, CRNP      sodium chloride  1 spray Each Nare Q1H PRN Tammy Singh DO      sodium chloride  3 mL Nebulization TID Macario Downy, CRJASPER          Today, Patient Was Seen By: Hayder Rossi DO    ** Please Note: Dictation voice to text software may have been used in the creation of this document   **

## 2021-02-04 NOTE — PROGRESS NOTES
Progress Note Rivka Mendoza 1960, 56 y o  male MRN: [id number]    Unit/Bed#: -01 Encounter: [id number]    Primary Care Provider: VERONICA Gutierrez   Date and time admitted to hospital: 1/13/2021  2:28 PM        * Pneumonia due to COVID-19 virus  Assessment & Plan  Tested positive for covid 12/31/2020  S/p completion of covid tx  S/p completion of IV abx for possible superimposed bacterial pna  Resp status stable, on 2l NC  Can be taken off isolation     Vocal cord dysfunction  Assessment & Plan  · Was extubated 1/25  · He had been getting NG tube feeds  · S/p swallow eval now on PO diet, dysphagia diet and tolerating   NGT removed  · Cont robinul  · Consider ENT consult if worsens      Sepsis due to COVID-19 [facility])  Assessment & Plan  · Resolved  · BC x 2 NGTD  · Completed Vanco and Cefepime, covid tx  · WBC normalized, afebrile    Adrenal insufficiency (HCC)  Assessment & Plan  Not thought to have AI per endocrine  Hydrocortisone discontinued    Myopathy  Assessment & Plan  Due to critical illness and steroid use  Continued PT    Hypokalemia  Assessment & Plan  · Replete accordingly      Aortic thrombus [facility])  Assessment & Plan  Caused compromise of right femoral artery  Required AKA on 1/18/21  On lovenox      Acute respiratory failure with hypoxia (HCC)  Assessment & Plan  · Secondary to COVID 19  · Extubated 1/25  · Currently on 5L  · Now on 2L  · Pulmonary toiletting    DM2 (diabetes mellitus, type 2) ([facility] [address] )  Assessment & Plan  Lab Results   Component Value Date    HGBA1C 11 6 (H) 12/09/2020       Recent Labs     02/03/21  0618 02/03/21  0650 02/03/21  1102 02/03/21  1259   POCGLU 55* 146* 204* 177*       Blood Sugar Average: Last 72 hrs:  (P) 129     Controlled  Endocrinology following w/ adjustment of insulin regimen    HTN (hypertension)  Assessment & Plan  · Continue lisinopril    VTE Pharmacologic Prophylaxis:   Pharmacologic: Enoxaparin (Lovenox)  Mechanical VTE Prophylaxis in Place: No    Patient Centered Rounds: I have performed bedside rounds with nursing staff today  Discussions with Specialists or Other Care Team Provider:     Education and Discussions with Family / Patient:     Time Spent for Care: 30 minutes  More than 50% of total time spent on counseling and coordination of care as described above  Current Length of Stay: 21 day(s)    Current Patient Status: Inpatient   Certification Statement: The patient will continue to require additional inpatient hospital stay due to Acute illness    Discharge Plan: will need placement    Code Status: Level 1 - Full Code      Subjective:   Not really talkative  NGT removed   + diffuse weakness  On 2l NC    Objective:     Vitals:   Temp (24hrs), Av 3 °F (36 8 °C), Min:97 °F (36 1 °C), Max:99 9 °F (37 7 °C)    Temp:  [97 °F (36 1 °C)-99 9 °F (37 7 °C)] 98 5 °F (36 9 °C)  HR:  [] 107  Resp:  [19-20] 20  BP: ()/(48-66) 118/66  SpO2:  [95 %-99 %] 99 %  Body mass index is 26 37 kg/m²  Input and Output Summary (last 24 hours): Intake/Output Summary (Last 24 hours) at 2/3/2021 2027  Last data filed at 2/3/2021 1401  Gross per 24 hour   Intake 220 ml   Output 1087 ml   Net -867 ml       Physical Exam:     Physical Exam  Constitutional:       Comments: Not talkative   Neck:      Musculoskeletal: Normal range of motion and neck supple  Cardiovascular:      Rate and Rhythm: Normal rate and regular rhythm  Pulses: Normal pulses  Heart sounds: Normal heart sounds  Pulmonary:      Effort: Pulmonary effort is normal       Comments: Poor inspiratory effort  Abdominal:      General: Abdomen is flat  Bowel sounds are normal       Palpations: Abdomen is soft  Musculoskeletal:      Comments: RT AKA   Neurological:      Mental Status: He is alert        Comments: Diffuse weakness in major muscle groups           Additional Data:     Labs:    Results from last 7 days   Lab Units 21  0614   WBC Thousand/uL 8 04 HEMOGLOBIN g/dL 9 3*   HEMATOCRIT % 30 4*   PLATELETS Thousands/uL 147*   NEUTROS PCT % 81*   LYMPHS PCT % 8*   MONOS PCT % 8   EOS PCT % 2     Results from last 7 days   Lab Units 02/03/21  0614   SODIUM mmol/L 137   POTASSIUM mmol/L 3 0*   CHLORIDE mmol/L 101   CO2 mmol/L 35*   BUN mg/dL 14   CREATININE mg/dL 0 39*   ANION GAP mmol/L 1*   CALCIUM mg/dL 8 4   GLUCOSE RANDOM mg/dL 52*         Results from last 7 days   Lab Units 02/03/21  1259 02/03/21  1102 02/03/21  0650 02/03/21  0618 02/02/21  2305 02/02/21  1716 02/02/21  1216 02/02/21  0642 02/01/21  2355 02/01/21  1730 02/01/21  1558 02/01/21  1138   POC GLUCOSE mg/dl 177* 204* 146* 55* 95 135 253* 169* 179* 197* 195* 219*                   * I Have Reviewed All Lab Data Listed Above  * Additional Pertinent Lab Tests Reviewed:  All Labs Within Last 24 Hours Reviewed    Imaging:    Imaging Reports Reviewed Today Include:   Imaging Personally Reviewed by Myself Includes:      Recent Cultures (last 7 days):           Last 24 Hours Medication List:   Current Facility-Administered Medications   Medication Dose Route Frequency Provider Last Rate    acetaminophen  650 mg Oral Q6H PRN Shannan A Sedora, CRNP      bisacodyl  10 mg Rectal Daily PRN Geraline Macadamia, CRNP      cholecalciferol  2,000 Units Oral Daily Shannan A Sedora, CRNP      Diclofenac Sodium  4 g Topical 4x Daily Shannan A Sedora, CRNP      enoxaparin  1 mg/kg Subcutaneous Q12H [address] Shannan A Sedora, CRNP      gabapentin  400 mg Oral TID Shannan A Sedora, CRNP      glycopyrrolate  1 mg Oral TID Shannan A Sedora, CRNP      HYDROmorphone  0 5 mg Intravenous Q3H PRN Shannan A Sedora, CRNP      HYDROmorphone  0 5 mg Intravenous Q3H PRN Shannan A Sedora, CRNP      insulin glargine  20 Units Subcutaneous HS Jatinder Ricardo MD      insulin lispro  1-6 Units Subcutaneous TID With Meals Dwight S Prechtel, DO      insulin lispro  10 Units Subcutaneous TID With Meals Santh MD Adolph      levalbuterol  1 25 mg Nebulization TID Shannananat Hopkins, VERONICA      Lidocaine Viscous HCl  15 mL Swish & Spit 4x Daily PRN VERONICA Kennedy      lisinopril  10 mg Oral Daily Shannan A Sandeep, VERONICA      menthol-methyl salicylate   Apply externally 4x Daily PRN VERONICA Santos      methadone  23 mg Per PEG Tube Q8H [address] Shannan A VERONICA Hopkins      SAMREEN ANTIFUNGAL  1 application Topical TID Sonia Mccall DO      multivitamin-minerals  1 tablet Oral Daily Shannan A VERONICA Hopkins      omeprazole (PRILOSEC) suspension 2 mg/mL  20 mg Oral Early Morning Shannan A VERONICA Hopkins      phenol  1 spray Mouth/Throat Q2H PRN Shannan A Rayoora, VERONICA      polyethylene glycol  17 g Oral Daily Shannan A Sandeep, VERONICA      potassium chloride  20 mEq Intravenous Once Janina Toney DO      senna  1 tablet Oral BID VERONICA Kennedy      sodium chloride  1 spray Each Nare Q1H PRN David Islas DO      sodium chloride  3 mL Nebulization TID VERONICA Santos          Today, Patient Was Seen By: Janina Toney DO    ** Please Note: Dictation voice to text software may have been used in the creation of this document   **

## 2021-02-04 NOTE — ASSESSMENT & PLAN NOTE
· Was extubated 1/25  · He had been getting NG tube feeds  · S/p swallow eval now on PO diet, dysphagia diet and tolerating   NGT removed  · Cont robinul

## 2021-02-04 NOTE — ASSESSMENT & PLAN NOTE
· Was extubated 1/25  · He had been getting NG tube feeds  · S/p swallow eval now on PO diet, dysphagia diet and tolerating   NGT removed  · Cont robinul  · Consider ENT consult if worsens

## 2021-02-04 NOTE — RESTORATIVE TECHNICIAN NOTE
Restorative Specialist Mobility Note       Activity: Other (Comment)(Sat pt EOB completed LB and UB exercises)

## 2021-02-04 NOTE — ASSESSMENT & PLAN NOTE
· Secondary to critical illness, no sign of acute blood loss  · Check stool occult  · Continue to monitor, transfuse if < 7   Type and screen for tomorrow am just in case

## 2021-02-04 NOTE — ASSESSMENT & PLAN NOTE
Tested positive for covid 12/31/2020  S/p completion of covid tx  S/p completion of IV abx for possible superimposed bacterial pna  Resp status stable, on 2l NC  Considered recovered

## 2021-02-04 NOTE — ASSESSMENT & PLAN NOTE
Lab Results   Component Value Date    HGBA1C 11 6 (H) 12/09/2020       Recent Labs     02/03/21  1820 02/03/21  2111 02/04/21  0607 02/04/21  1202   POCGLU 203* 175* 167* 106       Blood Sugar Average: Last 72 hrs:  (P) 646 [id number]     Controlled  Endocrinology following w/ adjustment of insulin regimen

## 2021-02-04 NOTE — ASSESSMENT & PLAN NOTE
Patient presenting with malnutrition  S/p nutrition assessment  Now on PO feeds with good intake per RN and med assistant

## 2021-02-04 NOTE — ASSESSMENT & PLAN NOTE
Lab Results   Component Value Date    HGBA1C 11 6 (H) 12/09/2020       Recent Labs     02/03/21  0618 02/03/21  0650 02/03/21  1102 02/03/21  1259   POCGLU 55* 146* 204* 177*       Blood Sugar Average: Last 72 hrs:  (P) 129     Controlled  Endocrinology following w/ adjustment of insulin regimen

## 2021-02-05 LAB
ABO GROUP BLD: NORMAL
ANION GAP SERPL CALCULATED.3IONS-SCNC: 2 MMOL/L (ref 4–13)
BLD GP AB SCN SERPL QL: NEGATIVE
BUN SERPL-MCNC: 13 MG/DL (ref 5–25)
CALCIUM SERPL-MCNC: 7.9 MG/DL (ref 8.3–10.1)
CHLORIDE SERPL-SCNC: 102 MMOL/L (ref 100–108)
CO2 SERPL-SCNC: 34 MMOL/L (ref 21–32)
CREAT SERPL-MCNC: 0.36 MG/DL (ref 0.6–1.3)
GFR SERPL CREATININE-BSD FRML MDRD: 133 ML/MIN/1.73SQ M
GLUCOSE SERPL-MCNC: 133 MG/DL (ref 65–140)
GLUCOSE SERPL-MCNC: 56 MG/DL (ref 65–140)
GLUCOSE SERPL-MCNC: 72 MG/DL (ref 65–140)
GLUCOSE SERPL-MCNC: 76 MG/DL (ref 65–140)
GLUCOSE SERPL-MCNC: 79 MG/DL (ref 65–140)
GLUCOSE SERPL-MCNC: 94 MG/DL (ref 65–140)
GLUCOSE SERPL-MCNC: 96 MG/DL (ref 65–140)
HCT VFR BLD AUTO: 26.6 % (ref 36.5–49.3)
HGB BLD-MCNC: 7.9 G/DL (ref 12–17)
POTASSIUM SERPL-SCNC: 3.8 MMOL/L (ref 3.5–5.3)
RH BLD: POSITIVE
SODIUM SERPL-SCNC: 138 MMOL/L (ref 136–145)
SPECIMEN EXPIRATION DATE: NORMAL

## 2021-02-05 PROCEDURE — 85014 HEMATOCRIT: CPT | Performed by: INTERNAL MEDICINE

## 2021-02-05 PROCEDURE — 86850 RBC ANTIBODY SCREEN: CPT | Performed by: INTERNAL MEDICINE

## 2021-02-05 PROCEDURE — 86900 BLOOD TYPING SEROLOGIC ABO: CPT | Performed by: INTERNAL MEDICINE

## 2021-02-05 PROCEDURE — 85018 HEMOGLOBIN: CPT | Performed by: INTERNAL MEDICINE

## 2021-02-05 PROCEDURE — 86901 BLOOD TYPING SEROLOGIC RH(D): CPT | Performed by: INTERNAL MEDICINE

## 2021-02-05 PROCEDURE — 82948 REAGENT STRIP/BLOOD GLUCOSE: CPT

## 2021-02-05 PROCEDURE — 99221 1ST HOSP IP/OBS SF/LOW 40: CPT | Performed by: OTOLARYNGOLOGY

## 2021-02-05 PROCEDURE — 99232 SBSQ HOSP IP/OBS MODERATE 35: CPT | Performed by: INTERNAL MEDICINE

## 2021-02-05 PROCEDURE — 80048 BASIC METABOLIC PNL TOTAL CA: CPT | Performed by: INTERNAL MEDICINE

## 2021-02-05 PROCEDURE — 92526 ORAL FUNCTION THERAPY: CPT

## 2021-02-05 PROCEDURE — 31575 DIAGNOSTIC LARYNGOSCOPY: CPT | Performed by: OTOLARYNGOLOGY

## 2021-02-05 RX ORDER — INSULIN GLARGINE 100 [IU]/ML
12 INJECTION, SOLUTION SUBCUTANEOUS
Status: DISCONTINUED | OUTPATIENT
Start: 2021-02-05 | End: 2021-02-07

## 2021-02-05 RX ORDER — ALBUTEROL SULFATE 90 UG/1
2 AEROSOL, METERED RESPIRATORY (INHALATION) EVERY 4 HOURS PRN
Status: DISCONTINUED | OUTPATIENT
Start: 2021-02-05 | End: 2021-02-25 | Stop reason: HOSPADM

## 2021-02-05 RX ORDER — INSULIN GLARGINE 100 [IU]/ML
18 INJECTION, SOLUTION SUBCUTANEOUS
Status: DISCONTINUED | OUTPATIENT
Start: 2021-02-05 | End: 2021-02-05

## 2021-02-05 RX ORDER — ALBUTEROL SULFATE 2.5 MG/3ML
2.5 SOLUTION RESPIRATORY (INHALATION) EVERY 4 HOURS PRN
Status: DISCONTINUED | OUTPATIENT
Start: 2021-02-05 | End: 2021-02-05

## 2021-02-05 RX ADMIN — METHADONE HYDROCHLORIDE 23 MG: 10 CONCENTRATE ORAL at 16:59

## 2021-02-05 RX ADMIN — MICONAZOLE NITRATE 1 APPLICATION: 20 CREAM TOPICAL at 21:50

## 2021-02-05 RX ADMIN — INSULIN LISPRO 10 UNITS: 100 INJECTION, SOLUTION INTRAVENOUS; SUBCUTANEOUS at 12:03

## 2021-02-05 RX ADMIN — GABAPENTIN 400 MG: 400 CAPSULE ORAL at 16:59

## 2021-02-05 RX ADMIN — MICONAZOLE NITRATE 1 APPLICATION: 20 CREAM TOPICAL at 09:46

## 2021-02-05 RX ADMIN — GLYCOPYRROLATE 1 MG: 1 TABLET ORAL at 17:06

## 2021-02-05 RX ADMIN — Medication 2000 UNITS: at 09:44

## 2021-02-05 RX ADMIN — LISINOPRIL 10 MG: 10 TABLET ORAL at 09:44

## 2021-02-05 RX ADMIN — GLYCOPYRROLATE 1 MG: 1 TABLET ORAL at 12:02

## 2021-02-05 RX ADMIN — METHADONE HYDROCHLORIDE 23 MG: 10 CONCENTRATE ORAL at 09:47

## 2021-02-05 RX ADMIN — METHADONE HYDROCHLORIDE 23 MG: 10 CONCENTRATE ORAL at 21:50

## 2021-02-05 RX ADMIN — Medication 20 MG: at 06:02

## 2021-02-05 RX ADMIN — INSULIN GLARGINE 12 UNITS: 100 INJECTION, SOLUTION SUBCUTANEOUS at 21:56

## 2021-02-05 RX ADMIN — GABAPENTIN 400 MG: 400 CAPSULE ORAL at 09:44

## 2021-02-05 RX ADMIN — GLYCOPYRROLATE 1 MG: 1 TABLET ORAL at 21:56

## 2021-02-05 RX ADMIN — INSULIN LISPRO 10 UNITS: 100 INJECTION, SOLUTION INTRAVENOUS; SUBCUTANEOUS at 09:52

## 2021-02-05 RX ADMIN — GABAPENTIN 400 MG: 400 CAPSULE ORAL at 21:56

## 2021-02-05 RX ADMIN — Medication 1 TABLET: at 09:44

## 2021-02-05 RX ADMIN — MICONAZOLE NITRATE 1 APPLICATION: 20 CREAM TOPICAL at 17:30

## 2021-02-05 RX ADMIN — ENOXAPARIN SODIUM 80 MG: 80 INJECTION SUBCUTANEOUS at 12:02

## 2021-02-05 NOTE — ASSESSMENT & PLAN NOTE
Lab Results   Component Value Date    HGBA1C 11 6 (H) 12/09/2020       Recent Labs     02/05/21  0539 02/05/21  0712 02/05/21  0951 02/05/21  1123   POCGLU 94 79 133 96       Blood Sugar Average: Last 72 hrs:  (P) 275 [id number]     Controlled  Endocrinology following w/ adjustment of insulin regimen

## 2021-02-05 NOTE — PLAN OF CARE
Problem: Prexisting or High Potential for Compromised Skin Integrity  Goal: Skin integrity is maintained or improved  Description: INTERVENTIONS:  - Identify patients at risk for skin breakdown  - Assess and monitor skin integrity  - Assess and monitor nutrition and hydration status  - Monitor labs   - Assess for incontinence   - Turn and reposition patient  - Assist with mobility/ambulation  - Relieve pressure over bony prominences  - Avoid friction and shearing  - Provide appropriate hygiene as needed including keeping skin clean and dry  - Evaluate need for skin moisturizer/barrier cream  - Collaborate with interdisciplinary team   - Patient/family teaching  - Consider wound care consult   Outcome: Progressing     Problem: Potential for Falls  Goal: Patient will remain free of falls  Description: INTERVENTIONS:  - Assess patient frequently for physical needs  -  Identify cognitive and physical deficits and behaviors that affect risk of falls  -  [place] fall precautions as indicated by assessment   - Educate patient/family on patient safety including physical limitations  - Instruct patient to call for assistance with activity based on assessment  - Modify environment to reduce risk of injury  - Consider OT/PT consult to assist with strengthening/mobility  Outcome: Progressing     Problem: Nutrition/Hydration-ADULT  Goal: Nutrient/Hydration intake appropriate for improving, restoring or maintaining nutritional needs  Description: Monitor and assess patient's nutrition/hydration status for malnutrition  Collaborate with interdisciplinary team and initiate plan and interventions as ordered  Monitor patient's weight and dietary intake as ordered or per policy  Utilize nutrition screening tool and intervene as necessary  Determine patient's food preferences and provide high-protein, high-caloric foods as appropriate       INTERVENTIONS:  - Monitor oral intake, urinary output, labs, and treatment plans  - Assess nutrition and hydration status and recommend course of action  - Evaluate amount of meals eaten  - Assist patient with eating if necessary   - Allow adequate time for meals  - Recommend/ encourage appropriate diets, oral nutritional supplements, and vitamin/mineral supplements  - Order, calculate, and assess calorie counts as needed  - Recommend, monitor, and adjust tube feedings and TPN/PPN based on assessed needs  - Assess need for intravenous fluids  - Provide specific nutrition/hydration education as appropriate  - Include patient/family/caregiver in decisions related to nutrition  Outcome: Progressing     Problem: PAIN - ADULT  Goal: Verbalizes/displays adequate comfort level or baseline comfort level  Description: Interventions:  - Encourage patient to monitor pain and request assistance  - Assess pain using appropriate pain scale  - Administer analgesics based on type and severity of pain and evaluate response  - Implement non-pharmacological measures as appropriate and evaluate response  - Consider cultural and social influences on pain and pain management  - Notify physician/advanced practitioner if interventions unsuccessful or patient reports new pain  Outcome: Progressing     Problem: INFECTION - ADULT  Goal: Absence or prevention of progression during hospitalization  Description: INTERVENTIONS:  - Assess and monitor for signs and symptoms of infection  - Monitor lab/diagnostic results  - Monitor all insertion sites, i e  indwelling lines, tubes, and drains  - Monitor endotracheal if appropriate and nasal secretions for changes in amount and color  - [place] appropriate cooling/warming therapies per order  - Administer medications as ordered  - Instruct and encourage patient and family to use good hand hygiene technique  - Identify and instruct in appropriate isolation precautions for identified infection/condition  Outcome: Progressing  Goal: Absence of fever/infection during neutropenic period  Description: INTERVENTIONS:  - Monitor WBC    Outcome: Progressing     Problem: SAFETY ADULT  Goal: Patient will remain free of falls  Description: INTERVENTIONS:  - Assess patient frequently for physical needs  -  Identify cognitive and physical deficits and behaviors that affect risk of falls    -  [place] fall precautions as indicated by assessment   - Educate patient/family on patient safety including physical limitations  - Instruct patient to call for assistance with activity based on assessment  - Modify environment to reduce risk of injury  - Consider OT/PT consult to assist with strengthening/mobility  Outcome: Progressing  Goal: Maintain or return to baseline ADL function  Description: INTERVENTIONS:  -  Assess patient's ability to carry out ADLs; assess patient's baseline for ADL function and identify physical deficits which impact ability to perform ADLs (bathing, care of mouth/teeth, toileting, grooming, dressing, etc )  - Assess/evaluate cause of self-care deficits   - Assess range of motion  - Assess patient's mobility; develop plan if impaired  - Assess patient's need for assistive devices and provide as appropriate  - Encourage maximum independence but intervene and supervise when necessary  - Involve family in performance of ADLs  - Assess for home care needs following discharge   - Consider OT consult to assist with ADL evaluation and planning for discharge  - Provide patient education as appropriate  Outcome: Progressing  Goal: Maintain or return mobility status to optimal level  Description: INTERVENTIONS:  - Assess patient's baseline mobility status (ambulation, transfers, stairs, etc )    - Identify cognitive and physical deficits and behaviors that affect mobility  - Identify mobility aids required to assist with transfers and/or ambulation (gait belt, sit-to-stand, lift, walker, cane, etc )  - [place] fall precautions as indicated by assessment  - Record patient progress and toleration of activity level on Mobility SBAR; progress patient to next Phase/Stage  - Instruct patient to call for assistance with activity based on assessment  - Consider rehabilitation consult to assist with strengthening/weightbearing, etc   Outcome: Progressing     Problem: DISCHARGE PLANNING  Goal: Discharge to home or other facility with appropriate resources  Description: INTERVENTIONS:  - Identify barriers to discharge w/patient and caregiver  - Arrange for needed discharge resources and transportation as appropriate  - Identify discharge learning needs (meds, wound care, etc )  - Arrange for interpretive services to assist at discharge as needed  - Refer to Case Management Department for coordinating discharge planning if the patient needs post-hospital services based on physician/advanced practitioner order or complex needs related to functional status, cognitive ability, or social support system  Outcome: Progressing     Problem: Knowledge Deficit  Goal: Patient/family/caregiver demonstrates understanding of disease process, treatment plan, medications, and discharge instructions  Description: Complete learning assessment and assess knowledge base    Interventions:  - Provide teaching at level of understanding  - Provide teaching via preferred learning methods  Outcome: Progressing

## 2021-02-05 NOTE — RESPIRATORY THERAPY NOTE
RT Protocol Note  Carlos Ramos 56 y o  male MRN: [id number]  Unit/Bed#: -01 Encounter: [id number]    Assessment    Principal Problem:    Pneumonia due to COVID-19 virus  Active Problems:    HTN (hypertension)    Methadone maintenance therapy patient ([place] [address] )    BMI 28 0-28 9,adult    DM2 (diabetes mellitus, type 2) (HCC)    Acute respiratory failure with hypoxia (HCC)    Sepsis due to COVID-19 [facility])    Aortic thrombus ([facility])    Anemia    Hypokalemia    Myopathy    Adrenal insufficiency ([facility])    Vocal cord dysfunction      Home Pulmonary Medications:    Home Devices/Therapy: (None per chart)    Past Medical History:   Diagnosis Date    Diabetes mellitus ([place] [address] )     GERD (gastroesophageal reflux disease)     Hypercholesteremia     Hypertension     Methadone use ([place] [address] )      Social History     Socioeconomic History    Marital status:      Spouse name: Not on file    Number of children: Not on file    Years of education: Not on file    Highest education level: Not on file   Occupational History    Not on file   Social Needs    Financial resource strain: Not on file    Food insecurity     Worry: Not on file     Inability: Not on file    Transportation needs     Medical: Not on file     Non-medical: Not on file   Tobacco Use    Smoking status: Former Smoker    Smokeless tobacco: Never Used   Substance and Sexual Activity    Alcohol use: No    Drug use: No     Comment: methadone    Sexual activity: Not on file   Lifestyle    Physical activity     Days per week: Not on file     Minutes per session: Not on file    Stress: Not on file   Relationships    Social connections     Talks on phone: Not on file     Gets together: Not on file     Attends Yazidi service: Not on file     Active member of club or organization: Not on file     Attends meetings of clubs or organizations: Not on file     Relationship status: Not on file    Intimate partner violence     Fear of current or ex partner: Not on file     Emotionally abused: Not on file     Physically abused: Not on file     Forced sexual activity: Not on file   Other Topics Concern    Not on file   Social History Narrative    Not on file       Subjective    Subjective Data: intubated    Objective    Physical Exam:   Assessment Type: (P) Pre-treatment  General Appearance: (P) Awake  Respiratory Pattern: (P) Normal  Chest Assessment: (P) Chest expansion symmetrical  Bilateral Breath Sounds: (P) Clear    Vitals:  Blood pressure 125/75, pulse 80, temperature 99 1 °F (37 3 °C), resp  rate 19, height 5' 6" (1 676 m), weight 73 7 kg (162 lb 7 7 oz), SpO2 96 %  Imaging and other studies: I have personally reviewed pertinent reports  O2 Device: 2 lpm NC     Plan    Respiratory Plan: Vent/NIV/HFNC        Resp Comments: (P) Pt with hx of PNA/Covid now recovered  He has no known hx of COPD or asthma, patient does have a history of smoking and quit a few years ago  He currently takes no respiratory meds at home  BS are clear with no wheezing or distress noted  He is resting comfortably on 2L NC  Will change TID udns to albuterol Q4 prn for sob/wheezing per respiratory protocol

## 2021-02-05 NOTE — CASE MANAGEMENT
CM reached out to pt's nephew Bryan Pearce ([phone]) for rehab choices  Family prefers [facility] in [place]  CM placed referral in [facility] and sent list of facilities within 20 miles of their home via e-mail to [email or web address] per request

## 2021-02-05 NOTE — ASSESSMENT & PLAN NOTE
· Was extubated 1/25  · S/p swallow re-eval upgraded to level 2   NGT has since been removed  · Cont robinul  · Consult ENT

## 2021-02-05 NOTE — PROGRESS NOTES
02/05/21 1500   Clinical Encounter Type   Visited With Patient   Church Encounters   Church Needs Prayer   Sacramental Encounters   Sacrament of Sick-Anointing Anointed

## 2021-02-05 NOTE — ASSESSMENT & PLAN NOTE
Tested positive for covid 12/31/2020  S/p completion of covid tx  S/p completion of IV abx for possible superimposed bacterial pna  Resp status stable, on 2l NC  Considered recovered; off isolation

## 2021-02-05 NOTE — ASSESSMENT & PLAN NOTE
· Secondary to critical illness, no sign of acute blood loss  · Neg stool occult  · Continue to monitor, transfuse if < 7

## 2021-02-05 NOTE — SPEECH THERAPY NOTE
Speech Language/Pathology    Speech/Language Pathology Progress Note    Patient Name: Criselda Maguire  CYZCHJONATHAN Date: 2/5/2021       Subjective:  "Oh I will take whatever"  Current Diet: puree, nt  Objective:  Pt seen for ongoing dx dysphagia tx  Vocal quality unchanged, still dysphonic, harsh/breathy  Pt trialed w/ thin juice & he requested toast, stating he was able to eat that prior to hospitalization  Pt able to pull off pieces  Manipulation was mildly prolonged but today he was able to fully clear the oral cavity  Effortful swallows with all w/ liquid washes  Pt able to take small controlled straw sips of thin juice (~4oz today)  Swallows are mild reduced, uses mult per sip but no overt immed coughing noted  Cough x1 end of session following the last sip thin  Assessment:  Pt w/ improving oral transfers & manipulation  Infrequent cough following thin, ? Habitual vs penetration or aspiration  Would be appropriate to trial upgrade of level 2 trays but for now would continue with nt    ?ENT for assessment of vocal quality  Plan/Recommendations:  Ok for level 2 w/ nt  ?ENT  Supervise & assist, pt w/ difficulty feeding self

## 2021-02-05 NOTE — PROGRESS NOTES
Progress Note Gerald Bowens 1960, 56 y o  male MRN: [id number]    Unit/Bed#: -01 Encounter: [id number]    Primary Care Provider: VERONICA Cunningham   Date and time admitted to hospital: 1/13/2021  2:28 PM        * Pneumonia due to COVID-19 virus  Assessment & Plan  Tested positive for covid 12/31/2020  S/p completion of covid tx  S/p completion of IV abx for possible superimposed bacterial pna  Resp status stable, on 2l NC  Considered recovered; off isolation    Vocal cord dysfunction  Assessment & Plan  · Was extubated 1/25  · S/p swallow re-eval upgraded to level 2  NGT has since been removed  · Cont robinul  · Consult ENT        Sepsis due to COVID-19 [facility])  Assessment & Plan  · Resolved  · BC x 2 NGTD  · Completed Vanco and Cefepime, covid tx  · WBC normalized, afebrile    DM2 (diabetes mellitus, type 2) ([facility] [address] )  Assessment & Plan  Lab Results   Component Value Date    HGBA1C 11 6 (H) 12/09/2020       Recent Labs     02/05/21  0539 02/05/21  0712 02/05/21  0951 02/05/21  1123   POCGLU 94 79 133 96       Blood Sugar Average: Last 72 hrs:  (P) 150 [id number]     Controlled  Endocrinology following w/ adjustment of insulin regimen    Acute respiratory failure with hypoxia (HCC)  Assessment & Plan  · Secondary to COVID 19  · Extubated 1/25  · Weaned down to 2L, POX stable      Anemia  Assessment & Plan  · Secondary to critical illness, no sign of acute blood loss  · Neg stool occult  · Continue to monitor, transfuse if < 7      Aortic thrombus [facility])  Assessment & Plan  Caused compromise of right femoral artery  Required AKA on 1/18/21  On lovenox      Myopathy  Assessment & Plan  · Due to critical illness and steroid use  · Will need rehab    Ad[facility] ([facility] )  Assessment & Plan  · Ruled out per endocrine  · Hydrocortisone discontinued      BMI 28 0-28 9,adult  Assessment & Plan  Patient presenting with malnutrition  S/p nutrition assessment  Now on PO feeds with good intake per RN and med assistant      Methadone maintenance therapy patient [facility])  Assessment & Plan  Chronic methadone use    HTN (hypertension)  Assessment & Plan  · Continue lisinopril    VTE Pharmacologic Prophylaxis:   Pharmacologic: Enoxaparin (Lovenox)  Mechanical VTE Prophylaxis in Place: No    Patient Centered Rounds: I have performed bedside rounds with nursing staff today  Discussions with Specialists or Other Care Team Provider:     Education and Discussions with Family / Patient: Patient    Time Spent for Care: 30 minutes  More than 50% of total time spent on counseling and coordination of care as described above  Current Length of Stay: 23 day(s)    Current Patient Status: Inpatient   Certification Statement: The patient will continue to require additional inpatient hospital stay due to Acute illness, requires placement    Discharge Plan:D/c planning to snf    Code Status: Level 1 - Full Code      Subjective:   No acute events overnight  Denies dyspnea    Objective:     Vitals:   Temp (24hrs), Av 9 °F (37 2 °C), Min:98 1 °F (36 7 °C), Max:99 5 °F (37 5 °C)    Temp:  [98 1 °F (36 7 °C)-99 5 °F (37 5 °C)] 99 °F (37 2 °C)  HR:  [] 85  BP: ()/(60-91) 166/91  SpO2:  [93 %-96 %] 93 %  Body mass index is 26 22 kg/m²  Input and Output Summary (last 24 hours): Intake/Output Summary (Last 24 hours) at 2021 1658  Last data filed at 2021 1300  Gross per 24 hour   Intake 660 ml   Output --   Net 660 ml       Physical Exam:     Physical Exam  Neck:      Musculoskeletal: Normal range of motion and neck supple  Cardiovascular:      Pulses: Normal pulses  Heart sounds: Normal heart sounds  No murmur  Pulmonary:      Effort: Pulmonary effort is normal  No respiratory distress  Breath sounds: No wheezing or rales  Abdominal:      General: Abdomen is flat  Bowel sounds are normal  There is no distension  Tenderness: There is no abdominal tenderness  There is no guarding  Musculoskeletal:      Comments: OFELIA AKA   Neurological:      General: No focal deficit present  Mental Status: He is alert and oriented to person, place, and time  Additional Data:     Labs:    Results from last 7 days   Lab Units 02/05/21  0635 02/04/21  0428 02/03/21  0614   WBC Thousand/uL  --  6 23 8 04   HEMOGLOBIN g/dL 7 9* 7 6* 9 3*   HEMATOCRIT % 26 6* 25 1* 30 4*   PLATELETS Thousands/uL  --  153 147*   NEUTROS PCT %  --   --  81*   LYMPHS PCT %  --   --  8*   MONOS PCT %  --   --  8   EOS PCT %  --   --  2     Results from last 7 days   Lab Units 02/05/21  0635   SODIUM mmol/L 138   POTASSIUM mmol/L 3 8   CHLORIDE mmol/L 102   CO2 mmol/L 34*   BUN mg/dL 13   CREATININE mg/dL 0 36*   ANION GAP mmol/L 2*   CALCIUM mg/dL 7 9*   GLUCOSE RANDOM mg/dL 76         Results from last 7 days   Lab Units 02/05/21  1643 02/05/21  1123 02/05/21  0951 02/05/21  0712 02/05/21  0539 02/04/21  2134 02/04/21  2044 02/04/21  1909 02/04/21  1202 02/04/21  0607 02/03/21  2111 02/03/21  1820   POC GLUCOSE mg/dl 56* 96 133 79 94 205* 234* 141* 106 167* 175* 203*                   * I Have Reviewed All Lab Data Listed Above  * Additional Pertinent Lab Tests Reviewed:  All Labs Within Last 24 Hours Reviewed    Imaging:    Imaging Reports Reviewed Today Include:   Imaging Personally Reviewed by Myself Includes:      Recent Cultures (last 7 days):           Last 24 Hours Medication List:   Current Facility-Administered Medications   Medication Dose Route Frequency Provider Last Rate    acetaminophen  650 mg Oral Q6H PRN Shannan A Sedora, CRNP      albuterol  2 puff Inhalation Q4H PRN Don Anger, DO      bisacodyl  10 mg Rectal Daily PRN Geraline Macadamia, CRNP      cholecalciferol  2,000 Units Oral Daily Shannan A Sedora, CRNP      Diclofenac Sodium  4 g Topical 4x Daily Shannan A Sedora, CRNP      enoxaparin  1 mg/kg Subcutaneous Q12H [address] Shannan A Sedora, CRNP      gabapentin  400 mg Oral TID Shannan A Josiephine Noss, VERONICA      glycopyrrolate  1 mg Oral TID Shannan A Sedora, VERONICA      HYDROmorphone  0 5 mg Intravenous Q3H PRN Shannan A Rayoora, VERONICA      insulin glargine  18 Units Subcutaneous HS Brian Christian MD      insulin lispro  1-6 Units Subcutaneous TID With Meals Roxanne Johnson DO      insulin lispro  8 Units Subcutaneous TID With Meals Brian Christian MD      Lidocaine Viscous HCl  15 mL Swish & Spit 4x Daily PRN Shannan A Sedora, VERONICA      lisinopril  10 mg Oral Daily Shannan A Sedora, CRNP      menthol-methyl salicylate   Apply externally 4x Daily PRN VERONICA Castle      methadone  23 mg Oral Q8H [facility] Roxanne Johnson DO      SAMREEN ANTIFUNGAL  1 application Topical TID Sonia Mccall DO      multivitamin-minerals  1 tablet Oral Daily Shannan A Sedora, STONENP      omeprazole (PRILOSEC) suspension 2 mg/mL  20 mg Oral Early Morning Shannan A Sedora, STONENP      phenol  1 spray Mouth/Throat Q2H PRN Shannan A Sedora, CRNP      polyethylene glycol  17 g Oral Daily Shannan A Sedora, CRNP      senna  1 tablet Oral BID Shannan A Sedora, STONENP      sodium chloride  1 spray Each Nare Q1H PRN Virgen Pacheco DO          Today, Patient Was Seen By: Roxanne Johnson DO    ** Please Note: Dictation voice to text software may have been used in the creation of this document   **

## 2021-02-05 NOTE — RESPIRATORY THERAPY NOTE
resp care , no smoking hx, no resp meds at home, no resp hx, [id number], shows multifocal pneumonia, pt in no distress     02/05/21 0912   Respiratory Assessment   Assessment Type Assess only   General Appearance Alert; Awake   Respiratory Pattern Normal   Chest Assessment Chest expansion symmetrical   Bilateral Breath Sounds Clear;Diminished   Cough None   Resp Comments no prn tx indicated,udn d/c'd, changed to an albuterol mdi q 4 prn for sob/wheezing   O2 Device nc

## 2021-02-06 PROBLEM — I82.409 ACUTE DEEP VEIN THROMBOSIS (DVT) OF LOWER EXTREMITY (HCC): Status: ACTIVE | Noted: 2021-02-06

## 2021-02-06 PROBLEM — Z79.4 TYPE 2 DIABETES MELLITUS, WITH LONG-TERM CURRENT USE OF INSULIN (HCC): Status: ACTIVE | Noted: 2021-01-11

## 2021-02-06 PROBLEM — R60.0 EDEMA OF LEFT UPPER EXTREMITY: Status: ACTIVE | Noted: 2021-02-06

## 2021-02-06 PROBLEM — R13.12 OROPHARYNGEAL DYSPHAGIA: Status: ACTIVE | Noted: 2021-01-30

## 2021-02-06 LAB
GLUCOSE SERPL-MCNC: 107 MG/DL (ref 65–140)
GLUCOSE SERPL-MCNC: 135 MG/DL (ref 65–140)
GLUCOSE SERPL-MCNC: 168 MG/DL (ref 65–140)
GLUCOSE SERPL-MCNC: 214 MG/DL (ref 65–140)

## 2021-02-06 PROCEDURE — 99233 SBSQ HOSP IP/OBS HIGH 50: CPT | Performed by: PHYSICIAN ASSISTANT

## 2021-02-06 PROCEDURE — 99232 SBSQ HOSP IP/OBS MODERATE 35: CPT | Performed by: INTERNAL MEDICINE

## 2021-02-06 PROCEDURE — 82948 REAGENT STRIP/BLOOD GLUCOSE: CPT

## 2021-02-06 RX ORDER — OXYCODONE HYDROCHLORIDE 5 MG/1
2.5 TABLET ORAL EVERY 4 HOURS PRN
Status: DISCONTINUED | OUTPATIENT
Start: 2021-02-06 | End: 2021-02-10

## 2021-02-06 RX ORDER — OXYCODONE HYDROCHLORIDE 5 MG/1
5 TABLET ORAL EVERY 4 HOURS PRN
Status: DISCONTINUED | OUTPATIENT
Start: 2021-02-06 | End: 2021-02-10

## 2021-02-06 RX ADMIN — INSULIN GLARGINE 12 UNITS: 100 INJECTION, SOLUTION SUBCUTANEOUS at 22:54

## 2021-02-06 RX ADMIN — SENNOSIDES 8.6 MG: 8.6 TABLET, FILM COATED ORAL at 18:18

## 2021-02-06 RX ADMIN — DICLOFENAC SODIUM 4 G: 10 GEL TOPICAL at 22:54

## 2021-02-06 RX ADMIN — METHADONE HYDROCHLORIDE 23 MG: 10 CONCENTRATE ORAL at 06:33

## 2021-02-06 RX ADMIN — Medication 2000 UNITS: at 11:53

## 2021-02-06 RX ADMIN — GABAPENTIN 400 MG: 400 CAPSULE ORAL at 22:54

## 2021-02-06 RX ADMIN — GLYCOPYRROLATE 1 MG: 1 TABLET ORAL at 18:18

## 2021-02-06 RX ADMIN — DICLOFENAC SODIUM 4 G: 10 GEL TOPICAL at 18:18

## 2021-02-06 RX ADMIN — INSULIN LISPRO 2 UNITS: 100 INJECTION, SOLUTION INTRAVENOUS; SUBCUTANEOUS at 11:56

## 2021-02-06 RX ADMIN — METHADONE HYDROCHLORIDE 23 MG: 10 CONCENTRATE ORAL at 22:53

## 2021-02-06 RX ADMIN — METHADONE HYDROCHLORIDE 23 MG: 10 CONCENTRATE ORAL at 15:46

## 2021-02-06 RX ADMIN — MICONAZOLE NITRATE 1 APPLICATION: 20 CREAM TOPICAL at 18:18

## 2021-02-06 RX ADMIN — POLYETHYLENE GLYCOL 3350 17 G: 17 POWDER, FOR SOLUTION ORAL at 11:53

## 2021-02-06 RX ADMIN — LISINOPRIL 10 MG: 10 TABLET ORAL at 11:56

## 2021-02-06 RX ADMIN — ENOXAPARIN SODIUM 80 MG: 80 INJECTION SUBCUTANEOUS at 01:53

## 2021-02-06 RX ADMIN — GABAPENTIN 400 MG: 400 CAPSULE ORAL at 11:54

## 2021-02-06 RX ADMIN — Medication 20 MG: at 06:32

## 2021-02-06 RX ADMIN — GLYCOPYRROLATE 1 MG: 1 TABLET ORAL at 22:53

## 2021-02-06 RX ADMIN — MICONAZOLE NITRATE 1 APPLICATION: 20 CREAM TOPICAL at 12:00

## 2021-02-06 RX ADMIN — DICLOFENAC SODIUM 4 G: 10 GEL TOPICAL at 11:54

## 2021-02-06 RX ADMIN — MICONAZOLE NITRATE 1 APPLICATION: 20 CREAM TOPICAL at 22:54

## 2021-02-06 RX ADMIN — GLYCOPYRROLATE 1 MG: 1 TABLET ORAL at 12:01

## 2021-02-06 RX ADMIN — ENOXAPARIN SODIUM 80 MG: 80 INJECTION SUBCUTANEOUS at 23:04

## 2021-02-06 RX ADMIN — GABAPENTIN 400 MG: 400 CAPSULE ORAL at 18:18

## 2021-02-06 RX ADMIN — Medication 1 TABLET: at 11:54

## 2021-02-06 RX ADMIN — ENOXAPARIN SODIUM 80 MG: 80 INJECTION SUBCUTANEOUS at 11:54

## 2021-02-06 RX ADMIN — SENNOSIDES 8.6 MG: 8.6 TABLET, FILM COATED ORAL at 11:54

## 2021-02-06 NOTE — ASSESSMENT & PLAN NOTE
· With embolic event that compromise right femoral artery subsequently requiriring R AKA on 1/18/21  · Continue therapeutic lovenox

## 2021-02-06 NOTE — ASSESSMENT & PLAN NOTE
· Extubated 1/25 and required NGT for nutrition   · Speech therapy following   · S/p swallow re-eval upgraded to level 2  NGT has since been removed    · Continue robinul  · Appreciate ENT input - no vocal cord motion impairment

## 2021-02-06 NOTE — ASSESSMENT & PLAN NOTE
· Tested positive for covid 12/31/2020  · S/p completion of covid tx  · S/p completion of IV abx for possible superimposed bacterial pna  · Resp status stable on 2 L NC  · Considered recovered; off isolation

## 2021-02-06 NOTE — PROGRESS NOTES
Progress Note - Berenice Lynn 1960, 56 y o  male MRN: [id number]  Unit/Bed#: -01 Encounter: [id number]  Primary Care Provider: VERONICA Gutierrez   Date and time admitted to hospital: 1/13/2021  2:28 PM    * Pneumonia due to COVID-19 virus  Assessment & Plan  · Tested positive for covid 12/31/2020  · S/p completion of covid tx  · S/p completion of IV abx for possible superimposed bacterial pna  · Resp status stable on 2 L NC  · Considered recovered; off isolation    Sepsis due to COVID-19 [facility])  Assessment & Plan  · Resolved  · BC x 2 NGTD  · Completed Vanco and Cefepime, covid tx  · WBC normalized, afebrile    Acute respiratory failure with hypoxia ([facility] [address] )  Assessment & Plan  · Secondary to COVID 19  · Extubated 1/25  · Currently requiring 2 L NC   · Monitor and provide/wean supplemental oxygen as needed to maintain SpO2 > 90%  · Respiratory protocol     Edema of right upper extremity  Assessment & Plan  · +2 pitting edema also with significant weakness/paresis - patient states swelling has been slowly getting worse over the last few weeks and he hasn't been able to move his arm "probably for 20 days"  · Venous duplex on 01/26 negative for acute or chronic DVT   · Noted patient was taking Lasix 40 mg daily prior to admission, consider resuming   · ? Need for further workup/management will discuss with attending     Aortic thrombus [facility])  Assessment & Plan  · With embolic event that compromise right femoral artery subsequently requiriring R AKA on 1/18/21  · Continue therapeutic lovenox    Oropharyngeal dysphagia  Assessment & Plan  · Extubated 1/25 and required NGT for nutrition   · Speech therapy following   · S/p swallow re-eval upgraded to level 2  NGT has since been removed    · Continue robinul  · Appreciate ENT input - no vocal cord motion impairment    Type 2 diabetes mellitus, with long-term current use of insulin (HCC)  Assessment & Plan  Lab Results   Component Value Date    HGBA1C 11 6 (H) 12/09/2020       Recent Labs     02/05/21  1123 02/05/21  1643 02/05/21  1723 02/06/21  0632   POCGLU 96 56* 72 168*       Blood Sugar Average: Last 72 hrs:  (P) 139 5     · Endocrinology following w/ adjustment of insulin regimen  · Carb controlled diet     Myopathy  Assessment & Plan  · Due to critical illness and steroid use  · Plan for rehab at discharge     Anemia  Assessment & Plan  · Secondary to critical illness, no sign of acute blood loss  · Neg stool occult  · Continue to monitor and transfuse if < 7    HTN (hypertension)  Assessment & Plan  · BP acceptable, monitor routinely   · Continue home dose lisinopril    Acute deep vein thrombosis (DVT) of lower extremity (HCC)  Assessment & Plan  · Duplex of the lower extremities on 1/12 revealed a right femoral deep venous thrombosis  · S/p R AKA on 01/18   · Continue therapeutic Lovenox     BMI 28 0-28 9,adult  Assessment & Plan  Patient presenting with malnutrition  S/p nutrition assessment  Now on PO feeds with good intake per RN and med assistant      Methadone maintenance therapy patient [facility])  Assessment & Plan  Chronic methadone use    VTE Pharmacologic Prophylaxis:   Pharmacologic: Enoxaparin (Lovenox)  Mechanical VTE Prophylaxis in Place: No    Patient Centered Rounds: I have performed bedside rounds with nursing staff today  Discussions with Specialists or Other Care Team Provider: primary RN, ROSSY attending     Education and Discussions with Family / Patient: patient, will call sister with update per patient request     Time Spent for Care: 20 minutes  More than 50% of total time spent on counseling and coordination of care as described above      Current Length of Stay: 24 day(s)    Current Patient Status: Inpatient   Certification Statement: The patient will continue to require additional inpatient hospital stay due to pending placement    Discharge Plan: pending rehab placement     Code Status: Level 1 - Full Code      Subjective:   Patient reports 6/10 pain in his R leg today  Otherwise denies complaints  Noted to have swelling in his RUE and patient states that's been ongoing for several weeks and slowly getting worse  He is also unable to move his RUE and states it's been that was "probably for 20 days"  Objective:     Vitals:   Temp (24hrs), Av 7 °F (37 1 °C), Min:98 5 °F (36 9 °C), Max:99 °F (37 2 °C)    Temp:  [98 5 °F (36 9 °C)-99 °F (37 2 °C)] 98 5 °F (36 9 °C)  HR:  [77-91] 77  BP: (162-166)/(80-92) 162/80  SpO2:  [87 %-94 %] 88 %  Body mass index is 26 22 kg/m²  Input and Output Summary (last 24 hours): Intake/Output Summary (Last 24 hours) at 2021 1115  Last data filed at 2021 0900  Gross per 24 hour   Intake 720 ml   Output 500 ml   Net 220 ml       Physical Exam:     Physical Exam  Vitals signs and nursing note reviewed  Constitutional:       General: He is not in acute distress  Cardiovascular:      Rate and Rhythm: Regular rhythm  Tachycardia present  Heart sounds: No murmur  Pulmonary:      Effort: Pulmonary effort is normal  No respiratory distress  Breath sounds: No wheezing or rales  Comments: SpO2 87% on room air, improved to 90% with 2 L NC  Abdominal:      General: Abdomen is flat  There is no distension  Palpations: Abdomen is soft  Tenderness: There is no abdominal tenderness  Musculoskeletal:         General: Deformity (S/p R AKA) present  Right lower leg: No edema  Left lower leg: No edema  Comments: +2 pitting edema RUE   Neurological:      Mental Status: He is alert and oriented to person, place, and time  Mental status is at baseline  Motor: Weakness (RUE strength 1/5) present           Additional Data:     Labs:    Results from last 7 days   Lab Units 21  0635 21  0428 21  0614   WBC Thousand/uL  --  6 23 8 04   HEMOGLOBIN g/dL 7 9* 7 6* 9 3*   HEMATOCRIT % 26 6* 25 1* 30 4*   PLATELETS Thousands/uL  --  153 147*   NEUTROS PCT % --   --  81*   LYMPHS PCT %  --   --  8*   MONOS PCT %  --   --  8   EOS PCT %  --   --  2     Results from last 7 days   Lab Units 02/05/21  0635   SODIUM mmol/L 138   POTASSIUM mmol/L 3 8   CHLORIDE mmol/L 102   CO2 mmol/L 34*   BUN mg/dL 13   CREATININE mg/dL 0 36*   ANION GAP mmol/L 2*   CALCIUM mg/dL 7 9*   GLUCOSE RANDOM mg/dL 76         Results from last 7 days   Lab Units 02/06/21  1105 02/06/21  0632 02/05/21  1723 02/05/21  1643 02/05/21  1123 02/05/21  0951 02/05/21  0712 02/05/21  0539 02/04/21  2134 02/04/21  2044 02/04/21  1909 02/04/21  1202   POC GLUCOSE mg/dl 214* 168* 72 56* 96 133 79 94 205* 234* 141* 106                   * I Have Reviewed All Lab Data Listed Above  * Additional Pertinent Lab Tests Reviewed:  All Labs Within Last 24 Hours Reviewed    Imaging:    Imaging Reports Reviewed Today Include: upper extremity venous duplex from 1/16  Imaging Personally Reviewed by Myself Includes:  none    Recent Cultures (last 7 days):           Last 24 Hours Medication List:   Current Facility-Administered Medications   Medication Dose Route Frequency Provider Last Rate    acetaminophen  650 mg Oral Q6H PRN Venson Balling, CRNP      albuterol  2 puff Inhalation Q4H PRN Tammie Landau, DO      bisacodyl  10 mg Rectal Daily PRN Venson Balling, STONENP      cholecalciferol  2,000 Units Oral Daily Shannan Hopkins, CRNP      Diclofenac Sodium  4 g Topical 4x Daily Shannan Hopkins, CRNP      enoxaparin  1 mg/kg Subcutaneous Q12H [address] Shannan A aRyoora, CRNP      gabapentin  400 mg Oral TID Shannan A Sandeep, CRNP      glycopyrrolate  1 mg Oral TID Shannan A Sandeep, CRNP      HYDROmorphone  0 5 mg Intravenous Q3H PRN Shannan A Sandeep, CRNP      insulin glargine  12 Units Subcutaneous HS Tammie Landau, DO      insulin lispro  1-6 Units Subcutaneous TID With Meals Tammie Landau, DO      insulin lispro  4 Units Subcutaneous TID With Meals Tammie Landau,       Lidocaine Viscous HCl  15 mL Swish & Spit 4x Daily PRN Shannan A Sedora, CRNP      lisinopril  10 mg Oral Daily Shannan A Sedora, CRNP      menthol-methyl salicylate   Apply externally 4x Daily PRN Kathleen Salazar, VERONICA      methadone  23 mg Oral Q8H [address] Christelle Moreno DO      SAMREEN ANTIFUNGAL  1 application Topical TID Sonia Mccall DO      multivitamin-minerals  1 tablet Oral Daily Shannan A Sedora, CRNP      omeprazole (PRILOSEC) suspension 2 mg/mL  20 mg Oral Early Morning Shannan A Sedora, CRNP      phenol  1 spray Mouth/Throat Q2H PRN Shannan A Sedora, CRNP      polyethylene glycol  17 g Oral Daily Shannan A Sedora, CRNP      senna  1 tablet Oral BID Shannan A Sedora, CRNP      sodium chloride  1 spray Each Nare Q1H PRN Azam Hernandez DO          Today, Patient Was Seen By: Ingris Mullins PA-C    ** Please Note: Dictation voice to text software may have been used in the creation of this document   **

## 2021-02-06 NOTE — NUTRITION
02/05/21 1909   Recommendations/Interventions   Summary Spoke with RN as PCA attending to pt - pt is fed by nursing, some confusion  Per RD protocol liberalized diet to omit 2gm Na & Low chol diet and changed to 4gm Na CCD2 Level 1 Dysphagia NTL & added Mightyshakes & Magic Cup BID as po supplements   Noted ST recommended upgrading diet to Level 2 Dysphagia NTL

## 2021-02-06 NOTE — CONSULTS
Otolaryngology Head and Neck Surgery Consultation    Chief complaint  hoarseness      History of the Present Illness    Vignesh Kelly is a 56 y o  who presents with swallowing prolonged intubation for COVID-19  He denies any airway distress  He has had a hoarse breathy voice over the past several days  No difficulty swallowing  No other complaints of the head and neck  Review of systems (symptoms negative, as below, unless superceded by positive findings in bold)    Positive for hoarseness, 10 point review of systems noted  Past Medical History:   Diagnosis Date    Diabetes mellitus ([facility] [address] )     GERD (gastroesophageal reflux disease)     Hypercholesteremia     Hypertension     Methadone use ([facility] )        Past Surgical History:   Procedure Laterality Date    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    Procedure: AMPUTATION ABOVE KNEE (AKA);   Surgeon: Martha Major MD;  [facility]: [facility];  Service: Vascular    AMPUTATION ABOVE KNEE (AKA) Right 1/18/2021    Procedure: AMPUTATION ABOVE KNEE (AKA) FORMALIZATION,  R AKA wound washout, wound closure;  Surgeon: Martha Major MD;  [facility]: [facility];  Service: Vascular    ARTERIOGRAM Right 1/13/2021    Procedure: ARTERIOGRAM;  Surgeon: Cheyenne Russell DO;  [facility];  Service: Vascular    IR LOWER EXTREMITY ANGIOGRAM  1/14/2021    THROMBECTOMY W/ EMBOLECTOMY Right 1/13/2021    Procedure: EMBOLECTOMY/THROMBECTOMY LOWER EXTREMITY;  Surgeon: Cheyenne Russell DO;  [facility]: [facility];  Service: Vascular       Social History     Socioeconomic History    Marital status:      Spouse name: Not on file    Number of children: Not on file    Years of education: Not on file    Highest education level: Not on file   Occupational History    Not on file   Social Needs    Financial resource strain: Not on file    Food insecurity     Worry: Not on file     Inability: Not on file    Transportation needs     Medical: Not on file Non-medical: Not on file   Tobacco Use    Smoking status: Former Smoker    Smokeless tobacco: Never Used   Substance and Sexual Activity    Alcohol use: No    Drug use: No     Comment: methadone    Sexual activity: Not on file   Lifestyle    Physical activity     Days per week: Not on file     Minutes per session: Not on file    Stress: Not on file   Relationships    Social connections     Talks on phone: Not on file     Gets together: Not on file     Attends Confucianism service: Not on file     Active member of club or organization: Not on file     Attends meetings of clubs or organizations: Not on file     Relationship status: Not on file    Intimate partner violence     Fear of current or ex partner: Not on file     Emotionally abused: Not on file     Physically abused: Not on file     Forced sexual activity: Not on file   Other Topics Concern    Not on file   Social History Narrative    Not on file       Family history: non-contributory    Physical Examination    /91   Pulse 86   Temp 99 °F (37 2 °C)   Resp 19   Ht 5' 6" (1 676 m)   Wt 73 7 kg (162 lb 7 7 oz)   SpO2 94%   BMI 26 22 kg/m²   Constitutional:  Well developed, well nourished and groomed, in no acute distress  Eyes:  Extra-ocular movements intact, pupils equally round and reactive to light and accommodation, the lids and conjunctivae are normal in appearance  HEENT:    Head: Atraumatic, normocephalic, no visible scalp lesions, bony palpation unremarkable without stepoffs, parotid and submandibular salivary glands non-tender to palpation and without masses bilaterally       Ears:  Auricles normal in appearance bilaterally, mastoid prominence non-tender    Nose/Sinuses:  External appearance unremarkable, no maxillary or frontal sinus tenderness to palpation bilaterally, anterior rhinoscopy reveals bilateral nasal crusting    Neck:  No visible or palpable cervical lesions or lymphadenopathy, thyroid gland is normal in size and symmetry and without masses, normal laryngeal elevation with swallowing  Cardiovascular:  Normal rate and rhythm, no palpable thrills, no jugulovenous distension observed  Respiratory:  Normal respiratory effort without evidence of retractions or use of accessory muscles  Integument:  Normal appearing without observed masses or lesions  Neurologic:  Cranial nerves II-XII intact bilaterally  Psychiatric:  Alert and oriented to time, place and person, normal affect  Flexible nasopharyngoscopy:      Imaging studies:  Flexible nasopharyngolaryngoscopy was performed after nasal topicalization with lidocaine and oxymetazoline  Findings demonstrate:    Nasal cavity:  Nasal crusting, no masses, exudates or polyps  Nasopharynx:  Clear, fossae of Rosenmuller clear bilaterally  Base of tongue:  Clear  Vallecula:   Empty  Epiglottis:  Crisp  Arytenoids/folds: Normal mucosa  Interaryntenoid: Normal mucosa  Postcricoid:  Normal mucosa  Glottis:  Normal vocal fold motion, no laryngeal masses; small vocal cord granulations bilaterally  Piriform sinuses: Clear      Pertinent laboratory data:       Assessment  56 y o  with hoarseness    Plan  Based on his endoscopic exam his hoarseness is due to post intubation vocal cord granulations  I counseled him that this should resolve slowly over time and his hoarseness should completely improved  There is no vocal cord motion impairment  Will sign off  Please call with questions

## 2021-02-06 NOTE — ASSESSMENT & PLAN NOTE
· Secondary to critical illness, no sign of acute blood loss  · Neg stool occult  · Continue to monitor and transfuse if < 7

## 2021-02-06 NOTE — RESPIRATORY THERAPY NOTE
RT Protocol Note  Truong Papa 56 y o  male MRN: [id number]  Unit/Bed#: -01 Encounter: [id number]    Assessment    Principal Problem:    Pneumonia due to COVID-19 virus  Active Problems:    HTN (hypertension)    Methadone maintenance therapy patient ([place] [address] )    BMI 28 0-28 9,adult    DM2 (diabetes mellitus, type 2) (HCC)    Acute respiratory failure with hypoxia (HCC)    Sepsis due to COVID-19 [facility])    Aortic thrombus ([facility])    Anemia    Hypokalemia    Myopathy    Adrenal insufficiency (HCC)    Vocal cord dysfunction      Home Pulmonary Medications:  None  Home Devices/Therapy: (None per chart)    Past Medical History:   Diagnosis Date    Diabetes mellitus ([place] [address] )     GERD (gastroesophageal reflux disease)     Hypercholesteremia     Hypertension     Methadone use ([place] [address] )      Social History     Socioeconomic History    Marital status:      Spouse name: Not on file    Number of children: Not on file    Years of education: Not on file    Highest education level: Not on file   Occupational History    Not on file   Social Needs    Financial resource strain: Not on file    Food insecurity     Worry: Not on file     Inability: Not on file    Transportation needs     Medical: Not on file     Non-medical: Not on file   Tobacco Use    Smoking status: Former Smoker    Smokeless tobacco: Never Used   Substance and Sexual Activity    Alcohol use: No    Drug use: No     Comment: methadone    Sexual activity: Not on file   Lifestyle    Physical activity     Days per week: Not on file     Minutes per session: Not on file    Stress: Not on file   Relationships    Social connections     Talks on phone: Not on file     Gets together: Not on file     Attends Confucianism service: Not on file     Active member of club or organization: Not on file     Attends meetings of clubs or organizations: Not on file     Relationship status: Not on file    Intimate partner violence     Fear of current or ex partner: Not on file     Emotionally abused: Not on file     Physically abused: Not on file     Forced sexual activity: Not on file   Other Topics Concern    Not on file   Social History Narrative    Not on file       Subjective    Subjective Data: intubated    Objective    Physical Exam:   Assessment Type: Assess only  General Appearance: Alert, Awake  Respiratory Pattern: Normal  Chest Assessment: Chest expansion symmetrical  Bilateral Breath Sounds: Clear, Diminished  Cough: None  O2 Device: nc    Vitals:  Blood pressure 166/91, pulse 86, temperature 99 °F (37 2 °C), resp  rate 19, height 5' 6" (1 676 m), weight 73 7 kg (162 lb 7 7 oz), SpO2 94 %  Imaging and other studies: I have personally reviewed pertinent reports  O2 Device: nc     Plan    Respiratory Plan: Discontinue Protocol        Resp Comments: Resp protocol d/c for NIV/HFNC/VENT at this time   Pt is doesnt have udn prn or schedule nebs and has already an albuterol HFA inhaler ordered/

## 2021-02-06 NOTE — ASSESSMENT & PLAN NOTE
· Secondary to COVID 19  · Extubated 1/25  · Currently requiring 2 L NC   · Monitor and provide/wean supplemental oxygen as needed to maintain SpO2 > 90%  · Respiratory protocol

## 2021-02-06 NOTE — ASSESSMENT & PLAN NOTE
· +2 pitting edema also with significant weakness/paresis - patient states swelling has been slowly getting worse over the last few weeks and he hasn't been able to move his arm "probably for 20 days"  · Venous duplex on 01/26 negative for acute or chronic DVT   · Noted patient was taking Lasix 40 mg daily prior to admission, consider resuming   · ?  Need for further workup/management will discuss with attending

## 2021-02-06 NOTE — ASSESSMENT & PLAN NOTE
Lab Results   Component Value Date    HGBA1C 11 6 (H) 12/09/2020       Recent Labs     02/05/21  1123 02/05/21  1643 02/05/21  1723 02/06/21  0632   POCGLU 96 56* 72 168*       Blood Sugar Average: Last 72 hrs:  (P) 139 5     · Endocrinology following w/ adjustment of insulin regimen  · Carb controlled diet

## 2021-02-06 NOTE — ASSESSMENT & PLAN NOTE
· Duplex of the lower extremities on 1/12 revealed a right femoral deep venous thrombosis  · S/p R AKA on 01/18   · Continue therapeutic Lovenox

## 2021-02-06 NOTE — PROGRESS NOTES
Progress Note - Parish Doran 56 y o  male MRN: [id number]    Unit/Bed#: -01 Encounter: [id number]      CC: diabetes phone f/u    Subjective:   Parish Doran is a 56y o  year old male with type 2 diabetes admitted with COVID pneumonia with a prolonged hospital course  Spoke with the patient's nurse  Patient was lethargic yesterday but is more alert today  He was started on dysphagia diet  The nurse fed him  He did have an episode of hypoglycemia to 56 mg/dl yesterday afternoon after receiving 10 units of Lispro  Objective:     Vitals: Blood pressure 162/80, pulse 77, temperature 98 5 °F (36 9 °C), resp  rate 19, height 5' 6" (1 676 m), weight 73 7 kg (162 lb 7 7 oz), SpO2 (!) 88 %  ,Body mass index is 26 22 kg/m²  Intake/Output Summary (Last 24 hours) at 2/6/2021 5874  Last data filed at 2/5/2021 1700  Gross per 24 hour   Intake 660 ml   Output 500 ml   Net 160 ml       Physical Exam/ Phone follow up      Lab, Imaging and other studies: I have personally reviewed pertinent reports  POC Glucose (mg/dl)   Date Value   02/06/2021 168 (H)   02/05/2021 72   02/05/2021 56 (L)   02/05/2021 96   02/05/2021 133   02/05/2021 79   02/05/2021 94   02/04/2021 205 (H)   02/04/2021 234 (H)   02/04/2021 141 (H)       Assessment/ Plan:    -Type 2 diabetes: uncontrolled with recent A1c 11 6%  He has known insulin resistance requiring nearly 200u insulin/day  He was on Ravi Cluck and  as outpt      Patient is started on dysphagia diet  He is currently on Lantus 12 units QHS and Lispro 4 units with meals and algorithm 3 correctional insulin with meals  He had an episode of hypoglycemia to 56 mg/dl after receiving 10 units of Lispro at 10 am and 10 units at 12 pm     Nurses instructed to give meal coverage with 4 units of lispro  We will decrease correctional insulin to alg 2  Continue Lantus 12 units q h s  And continue glucose monitoring for further adjustment      Will need close outpt follow up with endocrinology  Follows with Dr Jesus Snow  -Rest of care per primary team        Portions of the record may have been created with voice recognition software

## 2021-02-07 PROBLEM — R33.9 URINARY RETENTION: Status: ACTIVE | Noted: 2021-02-07

## 2021-02-07 LAB
ANION GAP SERPL CALCULATED.3IONS-SCNC: 3 MMOL/L (ref 4–13)
BASOPHILS # BLD AUTO: 0.03 THOUSANDS/ΜL (ref 0–0.1)
BASOPHILS NFR BLD AUTO: 0 % (ref 0–1)
BUN SERPL-MCNC: 11 MG/DL (ref 5–25)
CALCIUM SERPL-MCNC: 8.1 MG/DL (ref 8.3–10.1)
CHLORIDE SERPL-SCNC: 99 MMOL/L (ref 100–108)
CO2 SERPL-SCNC: 33 MMOL/L (ref 21–32)
CREAT SERPL-MCNC: 0.37 MG/DL (ref 0.6–1.3)
EOSINOPHIL # BLD AUTO: 0.11 THOUSAND/ΜL (ref 0–0.61)
EOSINOPHIL NFR BLD AUTO: 1 % (ref 0–6)
ERYTHROCYTE [DISTWIDTH] IN BLOOD BY AUTOMATED COUNT: 16.6 % (ref 11.6–15.1)
GFR SERPL CREATININE-BSD FRML MDRD: 131 ML/MIN/1.73SQ M
GLUCOSE SERPL-MCNC: 118 MG/DL (ref 65–140)
GLUCOSE SERPL-MCNC: 142 MG/DL (ref 65–140)
GLUCOSE SERPL-MCNC: 152 MG/DL (ref 65–140)
GLUCOSE SERPL-MCNC: 165 MG/DL (ref 65–140)
GLUCOSE SERPL-MCNC: 99 MG/DL (ref 65–140)
HCT VFR BLD AUTO: 28.4 % (ref 36.5–49.3)
HGB BLD-MCNC: 8.4 G/DL (ref 12–17)
IMM GRANULOCYTES # BLD AUTO: 0.05 THOUSAND/UL (ref 0–0.2)
IMM GRANULOCYTES NFR BLD AUTO: 1 % (ref 0–2)
LYMPHOCYTES # BLD AUTO: 1.05 THOUSANDS/ΜL (ref 0.6–4.47)
LYMPHOCYTES NFR BLD AUTO: 13 % (ref 14–44)
MCH RBC QN AUTO: 28.8 PG (ref 26.8–34.3)
MCHC RBC AUTO-ENTMCNC: 29.6 G/DL (ref 31.4–37.4)
MCV RBC AUTO: 97 FL (ref 82–98)
MONOCYTES # BLD AUTO: 0.87 THOUSAND/ΜL (ref 0.17–1.22)
MONOCYTES NFR BLD AUTO: 11 % (ref 4–12)
NEUTROPHILS # BLD AUTO: 5.72 THOUSANDS/ΜL (ref 1.85–7.62)
NEUTS SEG NFR BLD AUTO: 74 % (ref 43–75)
NRBC BLD AUTO-RTO: 0 /100 WBCS
PLATELET # BLD AUTO: 232 THOUSANDS/UL (ref 149–390)
PMV BLD AUTO: 10.4 FL (ref 8.9–12.7)
POTASSIUM SERPL-SCNC: 4.2 MMOL/L (ref 3.5–5.3)
RBC # BLD AUTO: 2.92 MILLION/UL (ref 3.88–5.62)
SODIUM SERPL-SCNC: 135 MMOL/L (ref 136–145)
WBC # BLD AUTO: 7.83 THOUSAND/UL (ref 4.31–10.16)

## 2021-02-07 PROCEDURE — 80048 BASIC METABOLIC PNL TOTAL CA: CPT | Performed by: PHYSICIAN ASSISTANT

## 2021-02-07 PROCEDURE — 82948 REAGENT STRIP/BLOOD GLUCOSE: CPT

## 2021-02-07 PROCEDURE — 99232 SBSQ HOSP IP/OBS MODERATE 35: CPT | Performed by: INTERNAL MEDICINE

## 2021-02-07 PROCEDURE — 85025 COMPLETE CBC W/AUTO DIFF WBC: CPT | Performed by: PHYSICIAN ASSISTANT

## 2021-02-07 RX ORDER — FUROSEMIDE 40 MG/1
40 TABLET ORAL DAILY
Status: DISCONTINUED | OUTPATIENT
Start: 2021-02-07 | End: 2021-02-25 | Stop reason: HOSPADM

## 2021-02-07 RX ORDER — INSULIN GLARGINE 100 [IU]/ML
10 INJECTION, SOLUTION SUBCUTANEOUS
Status: DISCONTINUED | OUTPATIENT
Start: 2021-02-07 | End: 2021-02-17

## 2021-02-07 RX ADMIN — INSULIN GLARGINE 10 UNITS: 100 INJECTION, SOLUTION SUBCUTANEOUS at 22:11

## 2021-02-07 RX ADMIN — GABAPENTIN 400 MG: 400 CAPSULE ORAL at 18:35

## 2021-02-07 RX ADMIN — METHADONE HYDROCHLORIDE 23 MG: 10 CONCENTRATE ORAL at 05:38

## 2021-02-07 RX ADMIN — MICONAZOLE NITRATE 1 APPLICATION: 20 CREAM TOPICAL at 09:10

## 2021-02-07 RX ADMIN — DICLOFENAC SODIUM 4 G: 10 GEL TOPICAL at 13:50

## 2021-02-07 RX ADMIN — METHADONE HYDROCHLORIDE 23 MG: 10 CONCENTRATE ORAL at 13:51

## 2021-02-07 RX ADMIN — SENNOSIDES 8.6 MG: 8.6 TABLET, FILM COATED ORAL at 09:09

## 2021-02-07 RX ADMIN — GABAPENTIN 400 MG: 400 CAPSULE ORAL at 22:12

## 2021-02-07 RX ADMIN — GLYCOPYRROLATE 1 MG: 1 TABLET ORAL at 18:32

## 2021-02-07 RX ADMIN — Medication 2000 UNITS: at 08:53

## 2021-02-07 RX ADMIN — MICONAZOLE NITRATE 1 APPLICATION: 20 CREAM TOPICAL at 22:13

## 2021-02-07 RX ADMIN — DICLOFENAC SODIUM 4 G: 10 GEL TOPICAL at 18:31

## 2021-02-07 RX ADMIN — DICLOFENAC SODIUM 4 G: 10 GEL TOPICAL at 09:10

## 2021-02-07 RX ADMIN — GLYCOPYRROLATE 1 MG: 1 TABLET ORAL at 09:12

## 2021-02-07 RX ADMIN — LISINOPRIL 10 MG: 10 TABLET ORAL at 09:09

## 2021-02-07 RX ADMIN — ENOXAPARIN SODIUM 80 MG: 80 INJECTION SUBCUTANEOUS at 13:49

## 2021-02-07 RX ADMIN — INSULIN LISPRO 1 UNITS: 100 INJECTION, SOLUTION INTRAVENOUS; SUBCUTANEOUS at 18:36

## 2021-02-07 RX ADMIN — GABAPENTIN 400 MG: 400 CAPSULE ORAL at 08:53

## 2021-02-07 RX ADMIN — MICONAZOLE NITRATE 1 APPLICATION: 20 CREAM TOPICAL at 18:37

## 2021-02-07 RX ADMIN — Medication 20 MG: at 05:38

## 2021-02-07 RX ADMIN — FUROSEMIDE 40 MG: 40 TABLET ORAL at 18:35

## 2021-02-07 RX ADMIN — GLYCOPYRROLATE 1 MG: 1 TABLET ORAL at 22:13

## 2021-02-07 RX ADMIN — Medication 1 TABLET: at 08:53

## 2021-02-07 RX ADMIN — POLYETHYLENE GLYCOL 3350 17 G: 17 POWDER, FOR SOLUTION ORAL at 09:09

## 2021-02-07 RX ADMIN — METHADONE HYDROCHLORIDE 23 MG: 10 CONCENTRATE ORAL at 22:12

## 2021-02-07 RX ADMIN — ENOXAPARIN SODIUM 80 MG: 80 INJECTION SUBCUTANEOUS at 23:24

## 2021-02-07 NOTE — ASSESSMENT & PLAN NOTE
· With embolic event that compromised right femoral artery subsequently requiriring R AKA on 1/18/21  · Continue therapeutic lovenox

## 2021-02-07 NOTE — ASSESSMENT & PLAN NOTE
· Extubated 1/25 and required NGT for nutrition   · Speech therapy following   · S/p swallow re-eval upgraded to level 2  NGT has since been removed    · Continue robinul  · S/p ENT consult, no vocal cord motion impairment

## 2021-02-07 NOTE — QUICK NOTE
Chart reviewed  Blood glucose are tightly controlled  We will decrease Lantus to 10 units daily and Lispro to 3 units with meals  Continue glucose monitoring

## 2021-02-07 NOTE — ASSESSMENT & PLAN NOTE
Lab Results   Component Value Date    HGBA1C 11 6 (H) 12/09/2020       Recent Labs     02/06/21  2107 02/07/21  0628 02/07/21  1149 02/07/21  1622   POCGLU 135 118 142* 152*       Blood Sugar Average: Last 72 hrs:  (P) 021 [id number]     · Endocrinology following w/ adjustment of insulin regimen   Lowered given had episode of hypoglycemia  · Carb controlled diet

## 2021-02-07 NOTE — ASSESSMENT & PLAN NOTE
· +2 pitting edema also with significant weakness/paresis   · S/p Venous duplex on 01/26 negative for acute or chronic DVT   · Resume on lasix

## 2021-02-07 NOTE — PLAN OF CARE
Problem: Prexisting or High Potential for Compromised Skin Integrity  Goal: Skin integrity is maintained or improved  Description: INTERVENTIONS:  - Identify patients at risk for skin breakdown  - Assess and monitor skin integrity  - Assess and monitor nutrition and hydration status  - Monitor labs   - Assess for incontinence   - Turn and reposition patient  - Assist with mobility/ambulation  - Relieve pressure over bony prominences  - Avoid friction and shearing  - Provide appropriate hygiene as needed including keeping skin clean and dry  - Evaluate need for skin moisturizer/barrier cream  - Collaborate with interdisciplinary team   - Patient/family teaching  - Consider wound care consult   Outcome: Progressing     Problem: Potential for Falls  Goal: Patient will remain free of falls  Description: INTERVENTIONS:  - Assess patient frequently for physical needs  -  Identify cognitive and physical deficits and behaviors that affect risk of falls  -  [place] fall precautions as indicated by assessment   - Educate patient/family on patient safety including physical limitations  - Instruct patient to call for assistance with activity based on assessment  - Modify environment to reduce risk of injury  - Consider OT/PT consult to assist with strengthening/mobility  Outcome: Progressing     Problem: Nutrition/Hydration-ADULT  Goal: Nutrient/Hydration intake appropriate for improving, restoring or maintaining nutritional needs  Description: Monitor and assess patient's nutrition/hydration status for malnutrition  Collaborate with interdisciplinary team and initiate plan and interventions as ordered  Monitor patient's weight and dietary intake as ordered or per policy  Utilize nutrition screening tool and intervene as necessary  Determine patient's food preferences and provide high-protein, high-caloric foods as appropriate       INTERVENTIONS:  - Monitor oral intake, urinary output, labs, and treatment plans  - Assess nutrition and hydration status and recommend course of action  - Evaluate amount of meals eaten  - Assist patient with eating if necessary   - Allow adequate time for meals  - Recommend/ encourage appropriate diets, oral nutritional supplements, and vitamin/mineral supplements  - Order, calculate, and assess calorie counts as needed  - Recommend, monitor, and adjust tube feedings and TPN/PPN based on assessed needs  - Assess need for intravenous fluids  - Provide specific nutrition/hydration education as appropriate  - Include patient/family/caregiver in decisions related to nutrition  Outcome: Progressing     Problem: PAIN - ADULT  Goal: Verbalizes/displays adequate comfort level or baseline comfort level  Description: Interventions:  - Encourage patient to monitor pain and request assistance  - Assess pain using appropriate pain scale  - Administer analgesics based on type and severity of pain and evaluate response  - Implement non-pharmacological measures as appropriate and evaluate response  - Consider cultural and social influences on pain and pain management  - Notify physician/advanced practitioner if interventions unsuccessful or patient reports new pain  Outcome: Progressing     Problem: INFECTION - ADULT  Goal: Absence or prevention of progression during hospitalization  Description: INTERVENTIONS:  - Assess and monitor for signs and symptoms of infection  - Monitor lab/diagnostic results  - Monitor all insertion sites, i e  indwelling lines, tubes, and drains  - Monitor endotracheal if appropriate and nasal secretions for changes in amount and color  - [place] appropriate cooling/warming therapies per order  - Administer medications as ordered  - Instruct and encourage patient and family to use good hand hygiene technique  - Identify and instruct in appropriate isolation precautions for identified infection/condition  Outcome: Progressing  Goal: Absence of fever/infection during neutropenic period  Description: INTERVENTIONS:  - Monitor WBC    Outcome: Progressing     Problem: SAFETY ADULT  Goal: Patient will remain free of falls  Description: INTERVENTIONS:  - Assess patient frequently for physical needs  -  Identify cognitive and physical deficits and behaviors that affect risk of falls    -  [place] fall precautions as indicated by assessment   - Educate patient/family on patient safety including physical limitations  - Instruct patient to call for assistance with activity based on assessment  - Modify environment to reduce risk of injury  - Consider OT/PT consult to assist with strengthening/mobility  Outcome: Progressing  Goal: Maintain or return to baseline ADL function  Description: INTERVENTIONS:  -  Assess patient's ability to carry out ADLs; assess patient's baseline for ADL function and identify physical deficits which impact ability to perform ADLs (bathing, care of mouth/teeth, toileting, grooming, dressing, etc )  - Assess/evaluate cause of self-care deficits   - Assess range of motion  - Assess patient's mobility; develop plan if impaired  - Assess patient's need for assistive devices and provide as appropriate  - Encourage maximum independence but intervene and supervise when necessary  - Involve family in performance of ADLs  - Assess for home care needs following discharge   - Consider OT consult to assist with ADL evaluation and planning for discharge  - Provide patient education as appropriate  Outcome: Progressing  Goal: Maintain or return mobility status to optimal level  Description: INTERVENTIONS:  - Assess patient's baseline mobility status (ambulation, transfers, stairs, etc )    - Identify cognitive and physical deficits and behaviors that affect mobility  - Identify mobility aids required to assist with transfers and/or ambulation (gait belt, sit-to-stand, lift, walker, cane, etc )  - [place] fall precautions as indicated by assessment  - Record patient progress and toleration of activity level on Mobility SBAR; progress patient to next Phase/Stage  - Instruct patient to call for assistance with activity based on assessment  - Consider rehabilitation consult to assist with strengthening/weightbearing, etc   Outcome: Progressing     Problem: DISCHARGE PLANNING  Goal: Discharge to home or other facility with appropriate resources  Description: INTERVENTIONS:  - Identify barriers to discharge w/patient and caregiver  - Arrange for needed discharge resources and transportation as appropriate  - Identify discharge learning needs (meds, wound care, etc )  - Arrange for interpretive services to assist at discharge as needed  - Refer to Case Management Department for coordinating discharge planning if the patient needs post-hospital services based on physician/advanced practitioner order or complex needs related to functional status, cognitive ability, or social support system  Outcome: Progressing     Problem: Knowledge Deficit  Goal: Patient/family/caregiver demonstrates understanding of disease process, treatment plan, medications, and discharge instructions  Description: Complete learning assessment and assess knowledge base    Interventions:  - Provide teaching at level of understanding  - Provide teaching via preferred learning methods  Outcome: Progressing

## 2021-02-07 NOTE — PROGRESS NOTES
Progress Note Rita Grain 1960, 56 y o  male MRN: [id number]    Unit/Bed#: -01 Encounter: [id number]    Primary Care Provider: VERONICA River   Date and time admitted to hospital: 1/13/2021  2:28 PM        * Pneumonia due to COVID-19 virus  Assessment & Plan  · Tested positive for covid 12/31/2020  · S/p completion of covid tx  · S/p completion of IV abx for possible superimposed bacterial pna  · Resp status stable on 2 L NC  · Considered recovered; off isolation    Oropharyngeal dysphagia  Assessment & Plan  · Extubated 1/25 and required NGT for nutrition   · Speech therapy following   · S/p swallow re-eval upgraded to level 2  NGT has since been removed  · Continue robinul  · S/p ENT consult, no vocal cord motion impairment    Sepsis due to COVID-19 [facility])  Assessment & Plan  · Resolved  · BC x 2 NGTD  · Completed Vanco and Cefepime, covid tx  · WBC normalized, afebrile    Type 2 diabetes mellitus, with long-term current use of insulin [facility])  Assessment & Plan  Lab Results   Component Value Date    HGBA1C 11 6 (H) 12/09/2020       Recent Labs     02/06/21  2107 02/07/21  0628 02/07/21  1149 02/07/21  1622   POCGLU 135 118 142* 152*       Blood Sugar Average: Last 72 hrs:  (P) 405 [id number]     · Endocrinology following w/ adjustment of insulin regimen   Lowered given had episode of hypoglycemia  · Carb controlled diet     Acute respiratory failure with hypoxia (HCC)  Assessment & Plan  · Secondary to COVID 19  · Extubated 1/25  · Currently requiring 2 L NC   · Monitor and provide/wean supplemental oxygen as needed to maintain SpO2 > 90%  · Respiratory protocol     Anemia  Assessment & Plan  · Secondary to critical illness, no sign of acute blood loss  · Neg stool occult  · Continue to monitor and transfuse if < 7    Aortic thrombus (HCC)  Assessment & Plan  · With embolic event that compromised right femoral artery subsequently requiriring R AKA on 1/18/21  · Continue therapeutic lovenox    Myopathy  Assessment & Plan  · Due to critical illness and steroid use  · Plan for rehab at discharge     Urinary retention  Assessment & Plan  Ur retention protocol, serial bladder scan    Acute deep vein thrombosis (DVT) of lower extremity (HCC)  Assessment & Plan  · Duplex of the lower extremities on  revealed a right femoral deep venous thrombosis  · S/p R AKA on    · Continue therapeutic Lovenox     Edema of right upper extremity  Assessment & Plan  · +2 pitting edema also with significant weakness/paresis   · S/p Venous duplex on  negative for acute or chronic DVT   · Resume on lasix      BMI 28 0-28 9,adult  Assessment & Plan  Patient presenting with malnutrition  S/p nutrition assessment        Methadone maintenance therapy patient [facility])  Assessment & Plan  Chronic methadone use    HTN (hypertension)  Assessment & Plan  · BP acceptable, monitor routinely   · Continue home dose lisinopril    VTE Pharmacologic Prophylaxis:   Pharmacologic: Enoxaparin (Lovenox)  Mechanical VTE Prophylaxis in Place: No    Patient Centered Rounds: I have performed bedside rounds with nursing staff today  Discussions with Specialists or Other Care Team Provider:     Education and Discussions with Family / Patient: Patient    Time Spent for Care: 30 minutes  More than 50% of total time spent on counseling and coordination of care as described above  Current Length of Stay: 25 day(s)    Current Patient Status: Inpatient   Certification Statement: The patient will continue to require additional inpatient hospital stay due to Awaiting placement    Discharge Plan: D/c planning to snf    Code Status: Level 1 - Full Code      Subjective:   Notified per RN pt had episode of urinary retention pvr > 400 thus straight cath     Pt appears now to be voiding as gown was soiled  Has no other complaints    Objective:     Vitals:   Temp (24hrs), Av 2 °F (37 3 °C), Min:98 4 °F (36 9 °C), Max:100 2 °F (37 9 °C)    Temp:  [98 4 °F (36 9 °C)-100 2 °F (37 9 °C)] 100 2 °F (37 9 °C)  HR:  [] 98  BP: (108-127)/(70-74) 108/70  SpO2:  [89 %-93 %] 89 %  Body mass index is 26 22 kg/m²  Input and Output Summary (last 24 hours): Intake/Output Summary (Last 24 hours) at 2/7/2021 1651  Last data filed at 2/7/2021 1125  Gross per 24 hour   Intake 110 ml   Output 900 ml   Net -790 ml       Physical Exam:     Physical Exam  Neck:      Musculoskeletal: Normal range of motion and neck supple  Cardiovascular:      Rate and Rhythm: Normal rate and regular rhythm  Pulses: Normal pulses  Heart sounds: Normal heart sounds  No murmur  Pulmonary:      Effort: Pulmonary effort is normal  No respiratory distress  Breath sounds: Normal breath sounds  No wheezing or rales  Abdominal:      General: Abdomen is flat  Bowel sounds are normal  There is no distension  Palpations: Abdomen is soft  Tenderness: There is no abdominal tenderness  There is no guarding  Musculoskeletal:         General: Swelling present  Comments: RUE edema  R AKA   Skin:     General: Skin is warm and dry  Neurological:      General: No focal deficit present  Mental Status: He is alert and oriented to person, place, and time           Additional Data:     Labs:    Results from last 7 days   Lab Units 02/07/21  0544   WBC Thousand/uL 7 83   HEMOGLOBIN g/dL 8 4*   HEMATOCRIT % 28 4*   PLATELETS Thousands/uL 232   NEUTROS PCT % 74   LYMPHS PCT % 13*   MONOS PCT % 11   EOS PCT % 1     Results from last 7 days   Lab Units 02/07/21  0544   SODIUM mmol/L 135*   POTASSIUM mmol/L 4 2   CHLORIDE mmol/L 99*   CO2 mmol/L 33*   BUN mg/dL 11   CREATININE mg/dL 0 37*   ANION GAP mmol/L 3*   CALCIUM mg/dL 8 1*   GLUCOSE RANDOM mg/dL 99         Results from last 7 days   Lab Units 02/07/21  1622 02/07/21  1149 02/07/21  0628 02/06/21  2107 02/06/21  1642 02/06/21  1105 02/06/21  0632 02/05/21  1723 02/05/21  1643 02/05/21  1123 02/05/21  0951 02/05/21  0712   POC GLUCOSE mg/dl 152* 142* 118 135 107 214* 168* 72 56* 96 133 79                   * I Have Reviewed All Lab Data Listed Above  * Additional Pertinent Lab Tests Reviewed:  All Labs Within Last 24 Hours Reviewed    Imaging:    Imaging Reports Reviewed Today Include:   Imaging Personally Reviewed by Myself Includes:      Recent Cultures (last 7 days):           Last 24 Hours Medication List:   Current Facility-Administered Medications   Medication Dose Route Frequency Provider Last Rate    acetaminophen  650 mg Oral Q6H PRN VERONICA Del Cid      albuterol  2 puff Inhalation Q4H PRN Shana Diesel, DO      bisacodyl  10 mg Rectal Daily PRN VERONICA Del Cid      cholecalciferol  2,000 Units Oral Daily Shannan A Sedora, VERONICA      Diclofenac Sodium  4 g Topical 4x Daily Shannan A Sedora, VERONICA      enoxaparin  1 mg/kg Subcutaneous Q12H [address] Shannan A Rayoora, VERONICA      furosemide  40 mg Oral Daily Shana Diesel, DO      gabapentin  400 mg Oral TID Shannan A Sedora, VERONICA      glycopyrrolate  1 mg Oral TID Shannan A Sedora, VERONICA      insulin glargine  10 Units Subcutaneous HS Goyo Vidal MD      insulin lispro  1-5 Units Subcutaneous TID AC Goyo Vidal MD      insulin lispro  3 Units Subcutaneous TID With Meals Goyo Vidal MD      Lidocaine Viscous HCl  15 mL Swish & Spit 4x Daily PRN Shannan A Sedora, VERONICA      lisinopril  10 mg Oral Daily Shannan A Sedora, VERONICA      menthol-methyl salicylate   Apply externally 4x Daily PRN VERONICA Del Cid      methadone  23 mg Oral Q8H [address] Shana Diesel, DO      SAMREEN ANTIFUNGAL  1 application Topical TID Hetul Mccall, DO      multivitamin-minerals  1 tablet Oral Daily Shannan A Sedora, VERONICA      omeprazole (PRILOSEC) suspension 2 mg/mL  20 mg Oral Early Morning Shannan A Sedora, VERONICA      oxyCODONE  2 5 mg Oral Q4H PRN Magaly Abad PA-C      oxyCODONE  5 mg Oral Q4H PRN Magaly Abad, JOSE      phenol  1 spray Mouth/Throat Q2H PRN Shannan A Sedora, CRNP      polyethylene glycol  17 g Oral Daily Shannan A Sedora, CRNP      senna  1 tablet Oral BID Shannan A Sedora, CRNP      sodium chloride  1 spray Each Nare Q1H PRN Stefan Clement DO          Today, Patient Was Seen By: Krystal Aguilar DO    ** Please Note: Dictation voice to text software may have been used in the creation of this document   **

## 2021-02-08 ENCOUNTER — APPOINTMENT (INPATIENT)
Dept: RADIOLOGY | Facility: HOSPITAL | Age: 61
DRG: 853 | End: 2021-02-08
Payer: COMMERCIAL

## 2021-02-08 LAB
ANION GAP SERPL CALCULATED.3IONS-SCNC: 4 MMOL/L (ref 4–13)
BUN SERPL-MCNC: 11 MG/DL (ref 5–25)
CALCIUM SERPL-MCNC: 8.2 MG/DL (ref 8.3–10.1)
CHLORIDE SERPL-SCNC: 100 MMOL/L (ref 100–108)
CO2 SERPL-SCNC: 34 MMOL/L (ref 21–32)
CREAT SERPL-MCNC: 0.63 MG/DL (ref 0.6–1.3)
GFR SERPL CREATININE-BSD FRML MDRD: 106 ML/MIN/1.73SQ M
GLUCOSE SERPL-MCNC: 131 MG/DL (ref 65–140)
GLUCOSE SERPL-MCNC: 134 MG/DL (ref 65–140)
GLUCOSE SERPL-MCNC: 142 MG/DL (ref 65–140)
GLUCOSE SERPL-MCNC: 162 MG/DL (ref 65–140)
GLUCOSE SERPL-MCNC: 175 MG/DL (ref 65–140)
MAGNESIUM SERPL-MCNC: 1.9 MG/DL (ref 1.6–2.6)
POTASSIUM SERPL-SCNC: 3.5 MMOL/L (ref 3.5–5.3)
SODIUM SERPL-SCNC: 138 MMOL/L (ref 136–145)

## 2021-02-08 PROCEDURE — 82948 REAGENT STRIP/BLOOD GLUCOSE: CPT

## 2021-02-08 PROCEDURE — 70450 CT HEAD/BRAIN W/O DYE: CPT

## 2021-02-08 PROCEDURE — 83735 ASSAY OF MAGNESIUM: CPT | Performed by: INTERNAL MEDICINE

## 2021-02-08 PROCEDURE — 99232 SBSQ HOSP IP/OBS MODERATE 35: CPT | Performed by: INTERNAL MEDICINE

## 2021-02-08 PROCEDURE — 92526 ORAL FUNCTION THERAPY: CPT

## 2021-02-08 PROCEDURE — 80048 BASIC METABOLIC PNL TOTAL CA: CPT | Performed by: INTERNAL MEDICINE

## 2021-02-08 PROCEDURE — 97535 SELF CARE MNGMENT TRAINING: CPT

## 2021-02-08 PROCEDURE — G1004 CDSM NDSC: HCPCS

## 2021-02-08 RX ORDER — METHADONE HYDROCHLORIDE 10 MG/1
70 TABLET ORAL DAILY
Status: DISCONTINUED | OUTPATIENT
Start: 2021-02-09 | End: 2021-02-15

## 2021-02-08 RX ORDER — METHADONE HYDROCHLORIDE 10 MG/ML
23 CONCENTRATE ORAL ONCE
Status: COMPLETED | OUTPATIENT
Start: 2021-02-08 | End: 2021-02-08

## 2021-02-08 RX ADMIN — GLYCOPYRROLATE 1 MG: 1 TABLET ORAL at 08:35

## 2021-02-08 RX ADMIN — ACETAMINOPHEN 650 MG: 325 TABLET, FILM COATED ORAL at 23:34

## 2021-02-08 RX ADMIN — OXYCODONE HYDROCHLORIDE 5 MG: 5 TABLET ORAL at 08:34

## 2021-02-08 RX ADMIN — DICLOFENAC SODIUM 4 G: 10 GEL TOPICAL at 12:01

## 2021-02-08 RX ADMIN — GABAPENTIN 400 MG: 400 CAPSULE ORAL at 16:50

## 2021-02-08 RX ADMIN — DICLOFENAC SODIUM 4 G: 10 GEL TOPICAL at 19:01

## 2021-02-08 RX ADMIN — DICLOFENAC SODIUM 4 G: 10 GEL TOPICAL at 21:39

## 2021-02-08 RX ADMIN — GLYCOPYRROLATE 1 MG: 1 TABLET ORAL at 16:52

## 2021-02-08 RX ADMIN — MENTHOL, METHYL SALICYLATE: 10; 15 CREAM TOPICAL at 21:37

## 2021-02-08 RX ADMIN — METHADONE HYDROCHLORIDE 23 MG: 10 CONCENTRATE ORAL at 21:36

## 2021-02-08 RX ADMIN — Medication 1 TABLET: at 08:29

## 2021-02-08 RX ADMIN — GABAPENTIN 400 MG: 400 CAPSULE ORAL at 08:29

## 2021-02-08 RX ADMIN — MICONAZOLE NITRATE 1 APPLICATION: 20 CREAM TOPICAL at 09:30

## 2021-02-08 RX ADMIN — DICLOFENAC SODIUM 4 G: 10 GEL TOPICAL at 10:00

## 2021-02-08 RX ADMIN — METHADONE HYDROCHLORIDE 23 MG: 10 CONCENTRATE ORAL at 06:14

## 2021-02-08 RX ADMIN — SENNOSIDES 8.6 MG: 8.6 TABLET, FILM COATED ORAL at 08:29

## 2021-02-08 RX ADMIN — SENNOSIDES 8.6 MG: 8.6 TABLET, FILM COATED ORAL at 16:50

## 2021-02-08 RX ADMIN — METHADONE HYDROCHLORIDE 23 MG: 10 CONCENTRATE ORAL at 14:46

## 2021-02-08 RX ADMIN — GLYCOPYRROLATE 1 MG: 1 TABLET ORAL at 21:35

## 2021-02-08 RX ADMIN — MICONAZOLE NITRATE 1 APPLICATION: 20 CREAM TOPICAL at 21:37

## 2021-02-08 RX ADMIN — FUROSEMIDE 40 MG: 40 TABLET ORAL at 08:29

## 2021-02-08 RX ADMIN — LISINOPRIL 10 MG: 10 TABLET ORAL at 08:29

## 2021-02-08 RX ADMIN — ENOXAPARIN SODIUM 80 MG: 80 INJECTION SUBCUTANEOUS at 12:00

## 2021-02-08 RX ADMIN — MICONAZOLE NITRATE 1 APPLICATION: 20 CREAM TOPICAL at 16:47

## 2021-02-08 RX ADMIN — Medication 20 MG: at 06:14

## 2021-02-08 RX ADMIN — GABAPENTIN 400 MG: 400 CAPSULE ORAL at 21:37

## 2021-02-08 RX ADMIN — Medication 2000 UNITS: at 08:29

## 2021-02-08 RX ADMIN — INSULIN LISPRO 1 UNITS: 100 INJECTION, SOLUTION INTRAVENOUS; SUBCUTANEOUS at 16:46

## 2021-02-08 RX ADMIN — MENTHOL, METHYL SALICYLATE 1 APPLICATION: 10; 15 CREAM TOPICAL at 21:38

## 2021-02-08 RX ADMIN — INSULIN GLARGINE 10 UNITS: 100 INJECTION, SOLUTION SUBCUTANEOUS at 21:35

## 2021-02-08 NOTE — OCCUPATIONAL THERAPY NOTE
Occupational Therapy Treatment Note       02/08/21 1003   OT Last Visit   OT Visit Date 02/08/21   Note Type   Note Type Treatment   Restrictions/Precautions   Weight Bearing Precautions Per Order Yes   RUE Weight Bearing Per Order NWB   RLE Weight Bearing Per Order NWB  (S/P AKA)   Braces or Orthoses   (ACE BANDAGE APPLIED TO RESIDUAL LIMB PER REQUEST OF RN )   General   Response to Previous Treatment Patient with no complaints from previous session   Lifestyle   Autonomy I ADLS/IADLS, transfers and functional mobility PTA   Reciprocal Relationships Pt lives w/ his siblings and nephews   Service to Others Pt works full time   Intrinsic Gratification Unable to report @ this time   Pain Assessment   Pain Assessment Tool 0-10   Pain Location/Orientation Orientation: Right   ADL   Where Assessed Edge of bed   Grooming Assistance 3  Moderate Assistance   Grooming Deficit Wash/dry face;Setup;Verbal cueing; Increased time to complete; Other (Comment)  (oral hygiene )   UB Bathing Assistance 3  Moderate Assistance   UB Bathing Deficit Left arm;Right arm   LB Bathing Assistance 2  Maximal Assistance   LB Bathing Deficit Buttocks   UB Dressing Assistance 2  Maximal Assistance   UB Dressing Deficit Thread RUE; Thread LUE;Pull around back   LB Dressing Assistance 2  Maximal Assistance   LB Dressing Deficit Don/doff L sock   Toileting Assistance  2  Maximal Assistance   Toileting Deficit Use of bedpan/urinal setup; Setup; Increased time to complete;Perineal hygiene   Bed Mobility   Rolling R 3  Moderate assistance   Additional items Assist x 1   Rolling L 3  Moderate assistance   Additional items Assist x 1   Supine to Sit 2  Maximal assistance   Additional items Assist x 2   Sit to Supine 2  Maximal assistance   Additional items Assist x 2   Cognition   Overall Cognitive Status Impaired   Arousal/Participation Alert; Cooperative   Attention Attends with cues to redirect   Orientation Level Oriented to person;Oriented to place; Disoriented to time;Disoriented to situation   Memory Unable to assess   Following Commands Follows one step commands without difficulty   Activity Tolerance   Activity Tolerance Patient limited by fatigue   Assessment   Assessment Pt participated in occupational therapy with focus on activity tolerance, bed mob, unsupported sitting balance and tolerance for pt engagement in functional self-care task/oral car,e and UB and LB AM self-care  Pt cleared by RN/Beba for pt participation in occupational therapy  Pt received HOB raised/supine pt sitting upright and agreeable to therapy following pt Identifiers confirmed  Pt unable to report his therapy goal 2* pt cognitive impairments however pt pleasant and cooperative with all therapy requests  Pt required assist for bed and assist for unsupported sitting balance 2* pt decreased coordination and balance deficits  Pt + for R UE edema/swelling below elbow  He required assist for UB and LB bathing and dressing 2* pt trunk control, balance and coordination  He required bed pan set up for toileting 2* pt decreased overall strength and pt deconditioning  Pt will require post acute rehab services to continue to address these above noted pt deficits which currently impair pt ADL and functional mob  Pt return to bed/supine and pt bed alarm active post session all needs within reach       Plan   Treatment Interventions ADL retraining   Goal Expiration Date 02/10/21   OT Treatment Day 4   OT Frequency 2-3x/wk   Recommendation   OT Discharge Recommendation Post-Acute Rehabilitation Services   AM-PAC Daily Activity Inpatient   Lower Body Dressing 1   Bathing 1   Toileting 1   Upper Body Dressing 1   Grooming 2   Eating 2   Daily Activity Raw Score 8   Turning Head Towards Sound 4   Follow Simple Instructions 3   Low Function Daily Activity Raw Score 15   Low Function Daily Activity Standardized Score 26 28   AM-PAC Applied Cognition Inpatient   Following a Speech/Presentation 2   Understanding Ordinary Conversation 3   Taking Medications 3   Remembering Where Things Are Placed or Put Away 3   Remembering List of 4-5 Errands 2   Taking Care of Complicated Tasks 2   Applied Cognition Raw Score 15   Applied Cognition Standardized Score 33 54   Barthel Index   Feeding 0   Bathing 0   Grooming Score 0   Dressing Score 0   Bladder Score 0   Bowels Score 0   Toilet Use Score 5   Transfers (Bed/Chair) Score 5   Mobility (Level Surface) Score 0   Stairs Score 0   [place] Index Score 10     Guillermo Dsouza  BROOKS/L

## 2021-02-08 NOTE — CASE MANAGEMENT
CM left a message for the [place] at [facility] for f/u  [facility] and [facility] have no appropriate beds available

## 2021-02-08 NOTE — ASSESSMENT & PLAN NOTE
· With embolic event that compromised right femoral artery  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    S/p R AKKELLY formalization 1/18   · Continue therapeutic lovenox  · F/u with vascular surgery as outpatient

## 2021-02-08 NOTE — PROGRESS NOTES
Pt has not voided since last night, bladder scan 400cc  [place] text sent to [facility], straight catheter ordered

## 2021-02-08 NOTE — PROGRESS NOTES
Progress Note Saige Tellez 1960, 56 y o  male MRN: [id number]    Unit/Bed#: -01 Encounter: [id number]    Primary Care Provider: VERONICA Wilson   Date and time admitted to hospital: 1/13/2021  2:28 PM        * Pneumonia due to COVID-19 virus  Assessment & Plan  · Tested positive for covid 12/31/2020  · S/p completion of covid tx  · S/p completion of IV abx for possible superimposed bacterial pna  · Resp status stable on 2 L NC  · Considered recovered; off isolation    Oropharyngeal dysphagia  Assessment & Plan  · Extubated 1/25 and required NGT for nutrition   · Speech therapy following   · S/p barium swallow  · S/p swallow re-eval upgraded to level 2  NGT has since been removed  · Continue robinul  · S/p ENT consult, no vocal cord motion impairment    Sepsis due to COVID-19 [facility])  Assessment & Plan  · Resolved  · BC x 2 NGTD  · Completed Vanco and Cefepime, covid tx  · WBC normalized, afebrile    Type 2 diabetes mellitus, with long-term current use of insulin [facility])  Assessment & Plan  Lab Results   Component Value Date    HGBA1C 11 6 (H) 12/09/2020       Recent Labs     02/07/21  2103 02/08/21  0624 02/08/21  1040 02/08/21  1619   POCGLU 165* 131 142* 175*       Blood Sugar Average: Last 72 hrs:  (P) 128 [id number]     · Endocrinology following w/ adjustment of insulin regimen  Lowered given had episodes of hypoglycemia  · Carb controlled diet     Acute respiratory failure with hypoxia (HCC)  Assessment & Plan  · Secondary to COVID 19  · Extubated 1/25  · Currently requiring 2 L NC   · Monitor and provide/wean supplemental oxygen as needed to maintain SpO2 > 90%  · Respiratory protocol     Anemia  Assessment & Plan  · Secondary to critical illness, no sign of acute blood loss  · Neg stool occult  · Continue to monitor and transfuse if < 7    Aortic thrombus (HCC)  Assessment & Plan    · With embolic event that compromised right femoral artery  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    S/p R AKA formalization 1/18   · Continue therapeutic lovenox  · F/u with vascular surgery as outpatient    Myopathy  Assessment & Plan  · Profound weakness especially due to critical illness and steroid use  · Plan for rehab at discharge     Urinary retention  Assessment & Plan  Resolved    Acute deep vein thrombosis (DVT) of lower extremity (HCC)  Assessment & Plan  · Duplex of the lower extremities on 1/12 revealed a right femoral deep venous thrombosis  · S/p R AKA on 01/18   · Continue therapeutic Lovenox     Edema of right upper extremity  Assessment & Plan  · +2 pitting edema also with significant weakness/paresis   · S/p Venous duplex on 01/26 negative for acute or chronic DVT   · Resumed back on lasix with some improvement      BMI 28 0-28 9,adult  Assessment & Plan  Patient presenting with malnutrition  S/p nutrition assessment        Methadone maintenance therapy patient [facility])  Assessment & Plan  Chronic methadone use    HTN (hypertension)  Assessment & Plan  · Stable  · Continue lisinopril, lasix      VTE Pharmacologic Prophylaxis:   Pharmacologic: Enoxaparin (Lovenox)  Mechanical VTE Prophylaxis in Place: No    Patient Centered Rounds: I have performed bedside rounds with nursing staff today  Discussions with Specialists or Other Care Team Provider:     Education and Discussions with Family / Patient: Patient, also called his nephew Mainor Denton and updated at length    Time Spent for Care: 30 minutes  More than 50% of total time spent on counseling and coordination of care as described above  Current Length of Stay: 26 day(s)    Current Patient Status: Inpatient   Certification Statement: The patient will continue to require additional inpatient hospital stay due to Awaiting placement    Discharge Plan: Awaiting placement, maybe difficult given methadone use    Code Status: Level 1 - Full Code      Subjective:   No acute events overnight  No recurring urinary retention      Objective:     Vitals: Temp (24hrs), Av 1 °F (37 3 °C), Min:98 2 °F (36 8 °C), Max:99 6 °F (37 6 °C)    Temp:  [98 2 °F (36 8 °C)-99 6 °F (37 6 °C)] 99 6 °F (37 6 °C)  HR:  [] 96  Resp:  [22] 22  BP: (140-159)/(76-89) 140/76  SpO2:  [90 %-95 %] 90 %  Body mass index is 26 22 kg/m²  Input and Output Summary (last 24 hours): Intake/Output Summary (Last 24 hours) at 2021 1700  Last data filed at 2021 1200  Gross per 24 hour   Intake 120 ml   Output 375 ml   Net -255 ml       Physical Exam:     Physical Exam  Neck:      Musculoskeletal: Normal range of motion and neck supple  Cardiovascular:      Rate and Rhythm: Normal rate and regular rhythm  Pulses: Normal pulses  Heart sounds: Normal heart sounds  Pulmonary:      Effort: Pulmonary effort is normal  No respiratory distress  Breath sounds: Normal breath sounds  No wheezing or rales  Abdominal:      General: Abdomen is flat  Bowel sounds are normal       Palpations: Abdomen is soft  Musculoskeletal:      Comments: R AKA  RUE edema   Neurological:      Mental Status: He is alert        Comments: Generalized weakness  More profound in RUE       Additional Data:     Labs:    Results from last 7 days   Lab Units 21  0544   WBC Thousand/uL 7 83   HEMOGLOBIN g/dL 8 4*   HEMATOCRIT % 28 4*   PLATELETS Thousands/uL 232   NEUTROS PCT % 74   LYMPHS PCT % 13*   MONOS PCT % 11   EOS PCT % 1     Results from last 7 days   Lab Units 21  0626   SODIUM mmol/L 138   POTASSIUM mmol/L 3 5   CHLORIDE mmol/L 100   CO2 mmol/L 34*   BUN mg/dL 11   CREATININE mg/dL 0 63   ANION GAP mmol/L 4   CALCIUM mg/dL 8 2*   GLUCOSE RANDOM mg/dL 134         Results from last 7 days   Lab Units 21  1619 21  1040 21  0624 21  2103 21  1622 21  1149 21  0628 21  2107 21  1642 21  1105 21  0632 21  1723   POC GLUCOSE mg/dl 175* 142* 131 165* 152* 142* 118 135 107 214* 168* 72               * I Have Reviewed All Lab Data Listed Above  * Additional Pertinent Lab Tests Reviewed:  All Labs Within Last 24 Hours Reviewed    Imaging:    Imaging Reports Reviewed Today Include:   Imaging Personally Reviewed by Myself Includes:      Recent Cultures (last 7 days):           Last 24 Hours Medication List:   Current Facility-Administered Medications   Medication Dose Route Frequency Provider Last Rate    acetaminophen  650 mg Oral Q6H PRN VERONICA Zimmer      albuterol  2 puff Inhalation Q4H PRN Leon Yap, DO      bisacodyl  10 mg Rectal Daily PRN VERONICA Zimmer      cholecalciferol  2,000 Units Oral Daily Shannan A Sedamarilys, VERONICA      Diclofenac Sodium  4 g Topical 4x Daily Shannan A Sandeep, VERONICA      enoxaparin  1 mg/kg Subcutaneous Q12H [facility] Shannan A VERONICA Hopkins      furosemide  40 mg Oral Daily Leon Yap, DO      gabapentin  400 mg Oral TID Shannan A VERONICA Hopkins      glycopyrrolate  1 mg Oral TID Shannan A VERONICA Hopkins      insulin glargine  10 Units Subcutaneous HS Allyson Burdick MD      insulin lispro  1-5 Units Subcutaneous TID AC Allyson Burdick MD      insulin lispro  3 Units Subcutaneous TID With Meals Allyson Burdick MD      Lidocaine Viscous HCl  15 mL Swish & Spit 4x Daily PRN Shannan A SedoraVERONICA      lisinopril  10 mg Oral Daily Shannan A SedVERONICA panda      menthol-methyl salicylate   Apply externally 4x Daily PRN VERONICA Zimmer      methadone  23 mg Oral Once Leon Yap, DO      [START ON 2/9/2021] methadone  70 mg Oral Daily eLon Yap, DO      SAMREEN ANTIFUNGAL  1 application Topical TID Hetul Mccall, DO      multivitamin-minerals  1 tablet Oral Daily Shannan A VERONICA Hopkins      omeprazole (PRILOSEC) suspension 2 mg/mL  20 mg Oral Early Morning Shannan A VERONICA Hopkins      oxyCODONE  2 5 mg Oral Q4H PRN Magaly E Held PA-C      oxyCODONE  5 mg Oral Q4H PRN Magaly E Held, PA-C      phenol  1 spray Mouth/Throat Q2H PRN VERONICA Kennedy      polyethylene glycol  17 g Oral Daily VERONICA Kennedy      senna  1 tablet Oral BID VERONICA Kennedy      sodium chloride  1 spray Each Nare Q1H PRN Monae Davis DO          Today, Patient Was Seen By: Yfn Rodriguez DO    ** Please Note: Dictation voice to text software may have been used in the creation of this document   **

## 2021-02-08 NOTE — ASSESSMENT & PLAN NOTE
· +2 pitting edema also with significant weakness/paresis   · S/p Venous duplex on 01/26 negative for acute or chronic DVT   · Resumed back on lasix with some improvement

## 2021-02-08 NOTE — CASE MANAGEMENT
CM received call from Estrella Harris (niece) inquiring about STD paperwork that was faxed from ICU  CM confirmed from prior documentation that STD was faxed to [phone] for [place] [facility] [place] on 1/26  Ninaida is going to call pt's HR to inform them  CM requested paperwork from ICU [facility] in case it needs to be re-faxed  CM also followed up with son-Nolan, as [facility] currently with no bed availability and a wait list   Son prefers [facility] for second choice and [facility] for 3rd choice  Referrals placed in [facility]  A post acute care recommendation was made by your care team for STR  Discussed Freedom of Choice with caregiver  List of agencies given to caregiver via in person  caregiver aware the list is custom filtered for them by preference  and that [facility]'s post acute providers are designated

## 2021-02-08 NOTE — PLAN OF CARE
Problem: Prexisting or High Potential for Compromised Skin Integrity  Goal: Skin integrity is maintained or improved  Description: INTERVENTIONS:  - Identify patients at risk for skin breakdown  - Assess and monitor skin integrity  - Assess and monitor nutrition and hydration status  - Monitor labs   - Assess for incontinence   - Turn and reposition patient  - Assist with mobility/ambulation  - Relieve pressure over bony prominences  - Avoid friction and shearing  - Provide appropriate hygiene as needed including keeping skin clean and dry  - Evaluate need for skin moisturizer/barrier cream  - Collaborate with interdisciplinary team   - Patient/family teaching  - Consider wound care consult   Outcome: Progressing     Problem: Nutrition/Hydration-ADULT  Goal: Nutrient/Hydration intake appropriate for improving, restoring or maintaining nutritional needs  Description: Monitor and assess patient's nutrition/hydration status for malnutrition  Collaborate with interdisciplinary team and initiate plan and interventions as ordered  Monitor patient's weight and dietary intake as ordered or per policy  Utilize nutrition screening tool and intervene as necessary  Determine patient's food preferences and provide high-protein, high-caloric foods as appropriate       INTERVENTIONS:  - Monitor oral intake, urinary output, labs, and treatment plans  - Assess nutrition and hydration status and recommend course of action  - Evaluate amount of meals eaten  - Assist patient with eating if necessary   - Allow adequate time for meals  - Recommend/ encourage appropriate diets, oral nutritional supplements, and vitamin/mineral supplements  - Order, calculate, and assess calorie counts as needed  - Recommend, monitor, and adjust tube feedings and TPN/PPN based on assessed needs  - Assess need for intravenous fluids  - Provide specific nutrition/hydration education as appropriate  - Include patient/family/caregiver in decisions related to nutrition  Outcome: Progressing     Problem: Potential for Falls  Goal: Patient will remain free of falls  Description: INTERVENTIONS:  - Assess patient frequently for physical needs  -  Identify cognitive and physical deficits and behaviors that affect risk of falls    -  [place] fall precautions as indicated by assessment   - Educate patient/family on patient safety including physical limitations  - Instruct patient to call for assistance with activity based on assessment  - Modify environment to reduce risk of injury  - Consider OT/PT consult to assist with strengthening/mobility  Outcome: Progressing     Problem: PAIN - ADULT  Goal: Verbalizes/displays adequate comfort level or baseline comfort level  Description: Interventions:  - Encourage patient to monitor pain and request assistance  - Assess pain using appropriate pain scale  - Administer analgesics based on type and severity of pain and evaluate response  - Implement non-pharmacological measures as appropriate and evaluate response  - Consider cultural and social influences on pain and pain management  - Notify physician/advanced practitioner if interventions unsuccessful or patient reports new pain  Outcome: Progressing     Problem: INFECTION - ADULT  Goal: Absence or prevention of progression during hospitalization  Description: INTERVENTIONS:  - Assess and monitor for signs and symptoms of infection  - Monitor lab/diagnostic results  - Monitor all insertion sites, i e  indwelling lines, tubes, and drains  - Monitor endotracheal if appropriate and nasal secretions for changes in amount and color  - [place] appropriate cooling/warming therapies per order  - Administer medications as ordered  - Instruct and encourage patient and family to use good hand hygiene technique  - Identify and instruct in appropriate isolation precautions for identified infection/condition  Outcome: Progressing  Goal: Absence of fever/infection during neutropenic period  Description: INTERVENTIONS:  - Monitor WBC    Outcome: Progressing     Problem: SAFETY ADULT  Goal: Patient will remain free of falls  Description: INTERVENTIONS:  - Assess patient frequently for physical needs  -  Identify cognitive and physical deficits and behaviors that affect risk of falls    -  [place] fall precautions as indicated by assessment   - Educate patient/family on patient safety including physical limitations  - Instruct patient to call for assistance with activity based on assessment  - Modify environment to reduce risk of injury  - Consider OT/PT consult to assist with strengthening/mobility  Outcome: Progressing  Goal: Maintain or return to baseline ADL function  Description: INTERVENTIONS:  -  Assess patient's ability to carry out ADLs; assess patient's baseline for ADL function and identify physical deficits which impact ability to perform ADLs (bathing, care of mouth/teeth, toileting, grooming, dressing, etc )  - Assess/evaluate cause of self-care deficits   - Assess range of motion  - Assess patient's mobility; develop plan if impaired  - Assess patient's need for assistive devices and provide as appropriate  - Encourage maximum independence but intervene and supervise when necessary  - Involve family in performance of ADLs  - Assess for home care needs following discharge   - Consider OT consult to assist with ADL evaluation and planning for discharge  - Provide patient education as appropriate  Outcome: Progressing  Goal: Maintain or return mobility status to optimal level  Description: INTERVENTIONS:  - Assess patient's baseline mobility status (ambulation, transfers, stairs, etc )    - Identify cognitive and physical deficits and behaviors that affect mobility  - Identify mobility aids required to assist with transfers and/or ambulation (gait belt, sit-to-stand, lift, walker, cane, etc )  - [place] fall precautions as indicated by assessment  - Record patient progress and toleration of activity level on Mobility SBAR; progress patient to next Phase/Stage  - Instruct patient to call for assistance with activity based on assessment  - Consider rehabilitation consult to assist with strengthening/weightbearing, etc   Outcome: Progressing     Problem: DISCHARGE PLANNING  Goal: Discharge to home or other facility with appropriate resources  Description: INTERVENTIONS:  - Identify barriers to discharge w/patient and caregiver  - Arrange for needed discharge resources and transportation as appropriate  - Identify discharge learning needs (meds, wound care, etc )  - Arrange for interpretive services to assist at discharge as needed  - Refer to Case Management Department for coordinating discharge planning if the patient needs post-hospital services based on physician/advanced practitioner order or complex needs related to functional status, cognitive ability, or social support system  Outcome: Progressing     Problem: Knowledge Deficit  Goal: Patient/family/caregiver demonstrates understanding of disease process, treatment plan, medications, and discharge instructions  Description: Complete learning assessment and assess knowledge base    Interventions:  - Provide teaching at level of understanding  - Provide teaching via preferred learning methods  Outcome: Progressing

## 2021-02-08 NOTE — ASSESSMENT & PLAN NOTE
· Extubated 1/25 and required NGT for nutrition   · Speech therapy following   · S/p barium swallow  · S/p swallow re-eval upgraded to level 2  NGT has since been removed    · Continue robinul  · S/p ENT consult, no vocal cord motion impairment

## 2021-02-08 NOTE — CASE MANAGEMENT
[facility], and [facility] unable to accept due to methadone treatment  CM reached out to Lea rizvi to discuss  Lea Khan would like referral to facilities within 20 miles radius to see who is able to accommodate  Referral placed in [facility]

## 2021-02-08 NOTE — UTILIZATION REVIEW
Continued Stay Review    Date: 2/7/2021                  Current Patient Class: IP  Current Level of Care: Med/Surg  HPI:60 y o  male initially admitted on 1/12 with PNA d/t covid-19 virus  Assessment/Plan:  covid-19 tx completed  Continues on O2 @ 2L nc  Had episode of urinary retention PVR >400 with need for straight cath x1  Resume lasix  Blood sugar better controlled, decrease lantus to 10 units daily and lispro to 3 units with meals per endocrinology  Fingerstick glucose checks w/ ssi  Plan to d/c to SNF rehab       Pertinent Labs/Diagnostic Results:     Results from last 7 days   Lab Units 02/07/21  0544 02/05/21  0635 02/04/21  0428 02/03/21  0614 02/02/21  1314   WBC Thousand/uL 7 83  --  6 23 8 04 8 44   HEMOGLOBIN g/dL 8 4* 7 9* 7 6* 9 3* 9 2*   HEMATOCRIT % 28 4* 26 6* 25 1* 30 4* 29 3*   PLATELETS Thousands/uL 232  --  153 147* 151   NEUTROS ABS Thousands/µL 5 72  --   --  6 52 7 21     Results from last 7 days   Lab Units 02/07/21  0544 02/05/21  0635 02/04/21  0428 02/03/21  0614   SODIUM mmol/L 135* 138 137 137   POTASSIUM mmol/L 4 2 3 8 4 1 3 0*   CHLORIDE mmol/L 99* 102 102 101   CO2 mmol/L 33* 34* 33* 35*   ANION GAP mmol/L 3* 2* 2* 1*   BUN mg/dL 11 13 13 14   CREATININE mg/dL 0 37* 0 36* 0 49* 0 39*   EGFR ml/min/1 73sq m 131 133 117 129   CALCIUM mg/dL 8 1* 7 9* 8 0* 8 4   MAGNESIUM mg/dL  --   --   --  1 8     Results from last 7 days   Lab Units 02/07/21  2103 02/07/21  1622 02/07/21  1149 02/07/21  0628 02/06/21  2107 02/06/21  1642 02/06/21  1105 02/06/21  0632 02/05/21  1723 02/05/21  1643   POC GLUCOSE mg/dl 165* 152* 142* 118 135 107 214* 168* 72 56*     Results from last 7 days   Lab Units 02/07/21  0544 02/05/21  0635 02/04/21  0428 02/03/21  0614 02/02/21  0936   GLUCOSE RANDOM mg/dL 99 76 204* 52* 172*     Vital Signs:   Date/Time  Temp  Pulse  BP  MAP (mmHg)  SpO2  Calculated FIO2 (%) - Nasal Cannula  Nasal Cannula O2 Flow Rate (L/min)  O2 Device   02/07/21 21:04:58  99 5 °F (37 5 °C)  107Abnormal   151/84  106  94 %  --  --  --   02/07/21 2100  --  --  --  --  --  28  2 L/min  Nasal cannula   02/07/21 18:32:58  --  107Abnormal   159/89  112  93 %  --  --  --   02/07/21 15:42:46  100 2 °F (37 9 °C)  98  108/70  83  89 %Abnormal   --  --  --   02/07/21 08:46:46  98 4 °F (36 9 °C)  74  126/71  89  93 %  --  --  --   02/07/21 0700  --  --  --  --  --  28  2 L/min  --   02/06/21 22:04:57  99 °F (37 2 °C)  102  127/74  92  91 %  --  --  --   02/06/21 2200  --  --  --  --  --  28  2 L/min  Nasal cannula   02/06/21 15:25:11  99 5 °F (37 5 °C)  87  152/75  101  97 %  --  --  --   02/06/21 1300  --  --  --  --  --  28  2 L/min  --   02/06/21 0900  --  --  --  --  --  28  2 L/min  --   02/06/21 07:13:03  98 5 °F (36 9 °C)  77  162/80  107  88 %Abnormal   --  --  --         Medications:   Scheduled Medications:  cholecalciferol, 2,000 Units, Oral, Daily  Diclofenac Sodium, 4 g, Topical, 4x Daily  enoxaparin, 1 mg/kg, Subcutaneous, Q12H LIZETT  furosemide, 40 mg, Oral, Daily  gabapentin, 400 mg, Oral, TID  glycopyrrolate, 1 mg, Oral, TID  insulin glargine, 10 Units, Subcutaneous, HS  insulin lispro, 1-5 Units, Subcutaneous, TID AC  insulin lispro, 3 Units, Subcutaneous, TID With Meals  lisinopril, 10 mg, Oral, Daily  methadone, 23 mg, Oral, Q8H [facility]  SAMREEN ANTIFUNGAL, 1 application, Topical, TID  multivitamin-minerals, 1 tablet, Oral, Daily  omeprazole (PRILOSEC) suspension 2 mg/mL, 20 mg, Oral, Early Morning  polyethylene glycol, 17 g, Oral, Daily  senna, 1 tablet, Oral, BID    PRN Meds:  acetaminophen, 650 mg, Oral, Q6H PRN  albuterol, 2 puff, Inhalation, Q4H PRN  bisacodyl, 10 mg, Rectal, Daily PRN  Lidocaine Viscous HCl, 15 mL, Swish & Spit, 4x Daily PRN  menthol-methyl salicylate, , Apply externally, 4x Daily PRN  oxyCODONE, 2 5 mg, Oral, Q4H PRN  oxyCODONE, 5 mg, Oral, Q4H PRN  phenol, 1 spray, Mouth/Throat, Q2H PRN  sodium chloride, 1 spray, Each Nare, Q1H PRN        Discharge Plan: TBD    Network Utilization Review Department  ATTENTION: Please call with any questions or concerns to [phone] and carefully listen to the prompts so that you are directed to the right person  All voicemails are confidential   Aleja Yun all requests for admission clinical reviews, approved or denied determinations and any other requests to dedicated fax number below belonging to the campus where the patient is receiving treatment   List of dedicated fax numbers for the Facilities:  [address] DENIALS (Administrative/Medical Necessity) [phone]   [address] (Maternity/NICU/Pediatrics) [phone]   [address] [place] [address] Dr Malia Robles 8144 (  Kana Epps "Cara" 103) [address] [place] [address] [address]eline Roxanne Almeida [address] [place]) [address] [place] [address] [phone]

## 2021-02-08 NOTE — SPEECH THERAPY NOTE
Speech Language/Pathology    Speech/Language Pathology Progress Note    Patient Name: Mary Connelly  PGQVI'K Date: 2/8/2021     Subjective:  Pt awake, alert, still w/ hoarse/breathy voice  ENT saw this weekend, noted post intubation vocal cord granulations  No impairment in vocal cord movement  Current Diet: level 1 puree/nt  Objective:  Pt seen w/ trial tray of level 2 w/ thin for potential upgrade  Pt requested that he be fed, unable to self feed  Mildly prolonged manipulation of the chicken, beans on the tray but able to fully transfer w/ 2 swallows  Pt periodically requested the thin sips in between bites- noted ? able loss of the solids in the oral cavity & spill, noted coughing for all sips trialed following solids  Attempted sips thin in isolation-mult swallows, mild coughing at least x1  Cued pt again for cup instead of straw- fair transfers w/ mult swallows noted  No overt coughing w/ the cup sips in very small sips       Assessment:  Pt w/ improved manipulation & oral control of the soft solids, still w/ overt s/s with thin despite attempts at strategies (smaller sips, cup, 2 -3 swallows  )    Plan/Recommendations:  Continue w/ level 2 /nt for now  Will continue w/ trials of thin

## 2021-02-08 NOTE — ASSESSMENT & PLAN NOTE
· Profound weakness especially due to critical illness and steroid use  · Plan for rehab at discharge

## 2021-02-08 NOTE — ASSESSMENT & PLAN NOTE
Lab Results   Component Value Date    HGBA1C 11 6 (H) 12/09/2020       Recent Labs     02/07/21  2103 02/08/21  0624 02/08/21  1040 02/08/21  1619   POCGLU 165* 131 142* 175*       Blood Sugar Average: Last 72 hrs:  (P) 128 [id number]     · Endocrinology following w/ adjustment of insulin regimen   Lowered given had episodes of hypoglycemia  · Carb controlled diet

## 2021-02-08 NOTE — PLAN OF CARE
Problem: OCCUPATIONAL THERAPY ADULT  Goal: Performs self-care activities at highest level of function for planned discharge setting  See evaluation for individualized goals  Description: Treatment Interventions: ADL retraining          See flowsheet documentation for full assessment, interventions and recommendations  Outcome: Progressing  Note: Limitation: Decreased ADL status, Decreased Safe judgement during ADL, Decreased UE strength, Decreased UE ROM, Decreased cognition, Decreased endurance, Decreased self-care trans, Decreased high-level ADLs, Decreased sensation, Decreased fine motor control, Visual deficit  Prognosis: Fair  Assessment: Pt participated in occupational therapy with focus on activity tolerance, bed mob, unsupported sitting balance and tolerance for pt engagement in functional self-care task/oral car,e and UB and LB AM self-care  Pt cleared by RN/Beba for pt participation in occupational therapy  Pt received HOB raised/supine pt sitting upright and agreeable to therapy following pt Identifiers confirmed  Pt unable to report his therapy goal 2* pt cognitive impairments however pt pleasant and cooperative with all therapy requests  Pt required assist for bed and assist for unsupported sitting balance 2* pt decreased coordination and balance deficits  Pt + for R UE edema/swelling below elbow  He required assist for UB and LB bathing and dressing 2* pt trunk control, balance and coordination  He required bed pan set up for toileting 2* pt decreased overall strength and pt deconditioning  Pt will require post acute rehab services to continue to address these above noted pt deficits which currently impair pt ADL and functional mob  Pt return to bed/supine and pt bed alarm active post session all needs within reach  OT Discharge Recommendation: Post-Acute Rehabilitation Services  OT - OK to Discharge:  Yes

## 2021-02-09 ENCOUNTER — APPOINTMENT (INPATIENT)
Dept: RADIOLOGY | Facility: HOSPITAL | Age: 61
DRG: 853 | End: 2021-02-09
Payer: COMMERCIAL

## 2021-02-09 PROBLEM — I82.412 LEFT FEMORAL VEIN DVT (HCC): Status: ACTIVE | Noted: 2021-02-09

## 2021-02-09 PROBLEM — I82.411 RIGHT FEMORAL VEIN DVT (HCC): Status: ACTIVE | Noted: 2021-02-09

## 2021-02-09 PROBLEM — R50.9 FEVER: Status: ACTIVE | Noted: 2021-02-09

## 2021-02-09 PROBLEM — I99.8 ISCHEMIA OF RIGHT LOWER EXTREMITY: Status: ACTIVE | Noted: 2021-02-06

## 2021-02-09 LAB
ANION GAP SERPL CALCULATED.3IONS-SCNC: 2 MMOL/L (ref 4–13)
BUN SERPL-MCNC: 14 MG/DL (ref 5–25)
CALCIUM SERPL-MCNC: 8.5 MG/DL (ref 8.3–10.1)
CHLORIDE SERPL-SCNC: 101 MMOL/L (ref 100–108)
CO2 SERPL-SCNC: 36 MMOL/L (ref 21–32)
CREAT SERPL-MCNC: 0.46 MG/DL (ref 0.6–1.3)
ERYTHROCYTE [DISTWIDTH] IN BLOOD BY AUTOMATED COUNT: 16 % (ref 11.6–15.1)
GFR SERPL CREATININE-BSD FRML MDRD: 120 ML/MIN/1.73SQ M
GLUCOSE SERPL-MCNC: 116 MG/DL (ref 65–140)
GLUCOSE SERPL-MCNC: 117 MG/DL (ref 65–140)
GLUCOSE SERPL-MCNC: 125 MG/DL (ref 65–140)
GLUCOSE SERPL-MCNC: 135 MG/DL (ref 65–140)
GLUCOSE SERPL-MCNC: 188 MG/DL (ref 65–140)
HCT VFR BLD AUTO: 28.1 % (ref 36.5–49.3)
HGB BLD-MCNC: 8.6 G/DL (ref 12–17)
MCH RBC QN AUTO: 29 PG (ref 26.8–34.3)
MCHC RBC AUTO-ENTMCNC: 30.6 G/DL (ref 31.4–37.4)
MCV RBC AUTO: 95 FL (ref 82–98)
PLATELET # BLD AUTO: 196 THOUSANDS/UL (ref 149–390)
PMV BLD AUTO: 9.6 FL (ref 8.9–12.7)
POTASSIUM SERPL-SCNC: 3.6 MMOL/L (ref 3.5–5.3)
RBC # BLD AUTO: 2.97 MILLION/UL (ref 3.88–5.62)
SODIUM SERPL-SCNC: 139 MMOL/L (ref 136–145)
WBC # BLD AUTO: 8.33 THOUSAND/UL (ref 4.31–10.16)

## 2021-02-09 PROCEDURE — 80048 BASIC METABOLIC PNL TOTAL CA: CPT | Performed by: INTERNAL MEDICINE

## 2021-02-09 PROCEDURE — 92526 ORAL FUNCTION THERAPY: CPT

## 2021-02-09 PROCEDURE — 85027 COMPLETE CBC AUTOMATED: CPT | Performed by: INTERNAL MEDICINE

## 2021-02-09 PROCEDURE — 99232 SBSQ HOSP IP/OBS MODERATE 35: CPT | Performed by: INTERNAL MEDICINE

## 2021-02-09 PROCEDURE — 97530 THERAPEUTIC ACTIVITIES: CPT

## 2021-02-09 PROCEDURE — 71045 X-RAY EXAM CHEST 1 VIEW: CPT

## 2021-02-09 PROCEDURE — 82948 REAGENT STRIP/BLOOD GLUCOSE: CPT

## 2021-02-09 RX ADMIN — GLYCOPYRROLATE 1 MG: 1 TABLET ORAL at 17:36

## 2021-02-09 RX ADMIN — MICONAZOLE NITRATE 1 APPLICATION: 20 CREAM TOPICAL at 21:19

## 2021-02-09 RX ADMIN — Medication 1 TABLET: at 08:50

## 2021-02-09 RX ADMIN — GLYCOPYRROLATE 1 MG: 1 TABLET ORAL at 11:26

## 2021-02-09 RX ADMIN — MICONAZOLE NITRATE 1 APPLICATION: 20 CREAM TOPICAL at 17:37

## 2021-02-09 RX ADMIN — Medication 2000 UNITS: at 08:50

## 2021-02-09 RX ADMIN — OXYCODONE HYDROCHLORIDE 5 MG: 5 TABLET ORAL at 21:20

## 2021-02-09 RX ADMIN — Medication 20 MG: at 06:05

## 2021-02-09 RX ADMIN — GLYCOPYRROLATE 1 MG: 1 TABLET ORAL at 21:21

## 2021-02-09 RX ADMIN — INSULIN LISPRO 1 UNITS: 100 INJECTION, SOLUTION INTRAVENOUS; SUBCUTANEOUS at 07:33

## 2021-02-09 RX ADMIN — ENOXAPARIN SODIUM 80 MG: 80 INJECTION SUBCUTANEOUS at 11:25

## 2021-02-09 RX ADMIN — GABAPENTIN 400 MG: 400 CAPSULE ORAL at 08:50

## 2021-02-09 RX ADMIN — INSULIN GLARGINE 10 UNITS: 100 INJECTION, SOLUTION SUBCUTANEOUS at 21:20

## 2021-02-09 RX ADMIN — MICONAZOLE NITRATE 1 APPLICATION: 20 CREAM TOPICAL at 08:51

## 2021-02-09 RX ADMIN — MENTHOL, METHYL SALICYLATE: 10; 15 CREAM TOPICAL at 11:26

## 2021-02-09 RX ADMIN — METHADONE HYDROCHLORIDE 70 MG: 10 TABLET ORAL at 08:54

## 2021-02-09 RX ADMIN — GABAPENTIN 400 MG: 400 CAPSULE ORAL at 21:20

## 2021-02-09 RX ADMIN — GABAPENTIN 400 MG: 400 CAPSULE ORAL at 17:36

## 2021-02-09 NOTE — ASSESSMENT & PLAN NOTE
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

## 2021-02-09 NOTE — PLAN OF CARE
Problem: OCCUPATIONAL THERAPY ADULT  Goal: Performs self-care activities at highest level of function for planned discharge setting  See evaluation for individualized goals  Description: Treatment Interventions: ADL retraining, Functional transfer training, Endurance training, Patient/family training          See flowsheet documentation for full assessment, interventions and recommendations  Outcome: Progressing  Note: Limitation: Decreased ADL status, Decreased Safe judgement during ADL, Decreased UE strength, Decreased UE ROM, Decreased cognition, Decreased endurance, Decreased self-care trans, Decreased high-level ADLs, Decreased sensation, Decreased fine motor control, Visual deficit  Prognosis: Fair  Assessment: Patient participated in Skilled OT session this date with interventions consisting of ADL re training with the use of correct body mechnaics, Energy Conservation techniques and  therapeutic activities to: increase activity tolerance   Patient agreeable to OT treatment session, upon arrival patient was found supine in bed  In comparison to previous session, patient with improvements in EOB sitting tolerance*   Patient requiring verbal cues for correct technique, verbal cues for pacing thru activity steps and cognitive assistance to anticipate next step  Patient continues to be functioning below baseline level, occupational performance remains limited secondary to factors listed above and increased risk for falls and injury  From OT standpoint, recommendation at time of d/c would be Short Term Rehab  Patient to benefit from continued Occupational Therapy treatment while in the hospital to address deficits as defined above and maximize level of functional independence with ADLs and functional mobility  Goals remain appropriate, OTR to extend goals  OT Discharge Recommendation: Post-Acute Rehabilitation Services  OT - OK to Discharge:  Yes

## 2021-02-09 NOTE — OCCUPATIONAL THERAPY NOTE
[address] Progress Note     Patient Name: Zackary BOYCE'S Date: 2/9/2021  Problem List  Principal Problem:    Pneumonia due to COVID-19 virus  Active Problems:    HTN (hypertension)    Methadone maintenance therapy patient ([facility] )    BMI 28 0-28 9,adult    Type 2 diabetes mellitus, with long-term current use of insulin (HCC)    Acute respiratory failure with hypoxia (HCC)    Sepsis due to COVID-19 [facility])    Aortic thrombus (HCC)    Anemia    Myopathy    Oropharyngeal dysphagia    Edema of right upper extremity    Acute deep vein thrombosis (DVT) of lower extremity ([facility] )    Urinary retention        02/09/21 1243   OT Last Visit   OT Visit Date 02/09/21   Note Type   Note Type Treatment   Restrictions/Precautions   Weight Bearing Precautions Per Order Yes   RLE Weight Bearing Per Order NWB  (R AKA)   Other Precautions Fall Risk;Pain;O2   General   Response to Previous Treatment Patient with no complaints from previous session   Lifestyle   Autonomy I ADLS/IADLS, transfers and functional mobility PTA   Reciprocal Relationships Pt lives w/ his siblings and nephews   Service to Others Pt works full time   Intrinsic Gratification Unable to report @ this time   Pain Assessment   Pain Assessment Tool Pain Assessment not indicated - pt denies pain   Pain Score No Pain   ADL   Where Assessed Edge of bed   Grooming Assistance 3  Moderate Assistance   Grooming Deficit Brushing hair   Grooming Comments washing hair w/ shower cap; unable to reach R arm    UB Dressing Assistance 2  Maximal Assistance   UB Dressing Deficit Thread RUE   UB Dressing Comments threadingR arm,very limited use of R arm   Bed Mobility   Supine to Sit 3  Moderate assistance   Additional items Assist x 2; Increased time required;Verbal cues;LE management   Sit to Supine 3  Moderate assistance   Additional items Assist x 2; Increased time required   Additional Comments performing long sit in bed to reach for ball x 2 set, 5 reps   ROM- Right Upper Extremities   R Shoulder PROM; Flexion; Extension   R Elbow AAROM;Elbow flexion;Elbow extension   R Wrist Wrist extension;Wrist flexion;AAROM   Cognition   Overall Cognitive Status Impaired   Arousal/Participation Cooperative   Attention Attends with cues to redirect   Orientation Level Oriented to person;Oriented to place;Oriented to situation;Disoriented to time   Memory Decreased recall of precautions;Decreased recall of recent events   Following Commands Follows one step commands without difficulty   Comments lethargic t/o, cues for redirect   Activity Tolerance   Activity Tolerance Patient limited by fatigue   Medical Staff Made Aware PT Fatou   Assessment   Assessment Patient participated in Skilled OT session this date with interventions consisting of ADL re training with the use of correct body mechnaics, Energy Conservation techniques and  therapeutic activities to: increase activity tolerance   Patient agreeable to OT treatment session, upon arrival patient was found supine in bed  In comparison to previous session, patient with improvements in EOB sitting tolerance*   Patient requiring verbal cues for correct technique, verbal cues for pacing thru activity steps and cognitive assistance to anticipate next step  Patient continues to be functioning below baseline level, occupational performance remains limited secondary to factors listed above and increased risk for falls and injury  From OT standpoint, recommendation at time of d/c would be Short Term Rehab  Patient to benefit from continued Occupational Therapy treatment while in the hospital to address deficits as defined above and maximize level of functional independence with ADLs and functional mobility  Goals remain appropriate, OTR to extend goals  The patient's raw score on the AM-PAC Daily Activity inpatient short form is 8, standardized score is  , less than 39 4   Patients at this level are likely to benefit from DC to post-acute rehabilitation services  Please refer to the recommendation of the Occupational Therapist for safe DC planning  Plan   Treatment Interventions ADL retraining;Functional transfer training; Endurance training;Patient/family training   Goal Expiration Date 02/23/21   OT Treatment Day 5   OT Frequency 2-3x/wk   Recommendation   OT Discharge Recommendation Post-Acute Rehabilitation Services   OT - OK to Discharge Yes   AM-PAC Daily Activity Inpatient   Lower Body Dressing 1   Bathing 2   Toileting 1   Upper Body Dressing 1   Grooming 2   Eating 2   Daily Activity Raw Score 9   Turning Head Towards Sound 4   Follow Simple Instructions 3   Low Function Daily Activity Raw Score 16   Low Function Daily Activity Standardized Score 27 65   AM-PAC Applied Cognition Inpatient   Following a Speech/Presentation 2   Understanding Ordinary Conversation 3   Taking Medications 3   Remembering Where Things Are Placed or Put Away 3   Remembering List of 4-5 Errands 2   Taking Care of Complicated Tasks 2   Applied Cognition Raw Score 15   Applied Cognition Standardized Score 33 54   Modified [place] Scale   Modified [place] Scale 5     Jennifer White MS, OTR/L

## 2021-02-09 NOTE — PLAN OF CARE
Problem: PHYSICAL THERAPY ADULT  Goal: Performs mobility at highest level of function for planned discharge setting  See evaluation for individualized goals  Description: Treatment/Interventions: Functional transfer training, LE strengthening/ROM, Therapeutic exercise, Endurance training, Bed mobility          See flowsheet documentation for full assessment, interventions and recommendations  Note: Prognosis: Fair  Problem List: Decreased strength, Decreased endurance, Impaired balance, Decreased mobility, Pain, Decreased skin integrity  Assessment: Pt is very pleasant  Initially, pt able to tolerate treatment session but fatigues easily  Focus on supine to sit transfers with total of 15 supine to long sit position  Tolerated sitting EOB for approx ~18 minutes with mod-min A x 1  Compared to previous session, pt shown improvement in order to complete supine to sit transfers with repetition  Pt most limited by fatigue and weakness  Recommend STR upon discharge once medically stable  Barriers to Discharge: Inaccessible home environment, Decreased caregiver support     PT Discharge Recommendation: [id number]     PT - OK to Discharge: Yes    See flowsheet documentation for full assessment

## 2021-02-09 NOTE — ASSESSMENT & PLAN NOTE
Patient had a fever of 101 8 last night  None this morning  But later in the afternoon again had low-grade 100 8  Otherwise asymptomatic  Per nursing patient has been intermittently coughing  Will obtain a chest x-ray  Continue monitor closely for now off antibiotics  If spikes high-grade fever again, will need full infectious workup including blood cultures

## 2021-02-09 NOTE — ASSESSMENT & PLAN NOTE
Lab Results   Component Value Date    HGBA1C 11 6 (H) 12/09/2020       Recent Labs     02/08/21  2131 02/09/21  0659 02/09/21  1114 02/09/21  1612   POCGLU 162* 188* 135 125       Blood Sugar Average: Last 72 hrs:  (P) 150 6     · Continue Lantus 10 units with 3 units Humalog t i d  With meals  Continue sliding scale

## 2021-02-09 NOTE — PHYSICAL THERAPY NOTE
Physical Therapy Treatment Note     Patient Name: Leonia Harada    QKFAN'P Date: 2/9/2021     Problem List  Principal Problem:    Pneumonia due to COVID-19 virus  Active Problems:    HTN (hypertension)    Methadone maintenance therapy patient ([place] [address] )    BMI 28 0-28 9,adult    Type 2 diabetes mellitus, with long-term current use of insulin (HCC)    Acute respiratory failure with hypoxia ([place] [address] )    Sepsis due to COVID-19 [facility])    Aortic thrombus (HCC)    Anemia    Myopathy    Oropharyngeal dysphagia    Edema of right upper extremity    Acute deep vein thrombosis (DVT) of lower extremity [facility])    Urinary retention       Past Medical History  Past Medical History:   Diagnosis Date    Diabetes mellitus ([place] [address] )     GERD (gastroesophageal reflux disease)     Hypercholesteremia     Hypertension     Methadone use ([place] [address] )         Past Surgical History  Past Surgical History:   Procedure Laterality Date    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    Procedure: AMPUTATION ABOVE KNEE (AKA);   Surgeon: Evangelina Christensen MD;  [facility];  Service: Vascular    AMPUTATION ABOVE KNEE (AKA) Right 1/18/2021    Procedure: AMPUTATION ABOVE KNEE (AKA) FORMALIZATION,  R AKA wound washout, wound closure;  Surgeon: Evangelina Christensen MD;  [facility];  Service: Vascular    ARTERIOGRAM Right 1/13/2021    Procedure: ARTERIOGRAM;  Surgeon: Didier Corcoran DO;  [facility];  Service: Vascular    IR LOWER EXTREMITY ANGIOGRAM  1/14/2021    THROMBECTOMY W/ EMBOLECTOMY Right 1/13/2021    Procedure: EMBOLECTOMY/THROMBECTOMY LOWER EXTREMITY;  Surgeon: Didier Corcoran DO;  [facility];  Service: Vascular      02/09/21 1244   PT Last Visit   PT Visit Date 02/09/21   Note Type   Note Type Treatment   Pain Assessment   Pain Assessment Tool Pain Assessment not indicated - pt denies pain   Pain Score No Pain   Restrictions/Precautions   Weight Bearing Precautions Per Order Yes   RLE Weight Bearing Per Order NWB  (R AKA)   General   Chart Reviewed Yes   Response to Previous Treatment Patient with no complaints from previous session  Family/Caregiver Present No   Cognition   Overall Cognitive Status Impaired   Arousal/Participation Cooperative   Attention Attends with cues to redirect   Orientation Level Oriented to person;Oriented to place;Oriented to time;Disoriented to situation   Memory Decreased recall of precautions;Decreased recall of recent events   Following Commands Follows one step commands without difficulty   Comments Motivated to participate in PT sessino   Bed Mobility   Rolling R 3  Moderate assistance   Additional items Assist x 1; Increased time required;Verbal cues;LE management   Rolling L 3  Moderate assistance   Additional items Assist x 1;Leg ;Verbal cues;LE management   Supine to Sit 3  Moderate assistance   Additional items Assist x 2; Increased time required;Verbal cues   Sit to Supine 3  Moderate assistance   Additional items Assist x 2; Increased time required;Verbal cues   Additional Comments Half sit with sheet underneath patients upper trunk to weight shift forward, 5x2 and mod A x 1 to complete  5x2 long sit to reach for a ball, mod A x 2 to complete  Transfers   Sit to Stand Unable to assess   Balance   Static Sitting Poor -   Dynamic Sitting Poor   Static Standing Zero   Endurance Deficit   Endurance Deficit Yes   Endurance Deficit Description weakness   Activity Tolerance   Activity Tolerance Patient limited by fatigue   Nurse Made Aware Yes   Exercises   UE Exercise Sitting;10 reps; Left;AROM  (2 pound therabar )   Balance training  Unsupported sitting EOB for approx ~18 minutes, mod A x 1 for reaching  Upright head posture only able to maintain neutral posistion for 27 seconds, frequent verbal cues for head control  Assessment   Prognosis Fair   Problem List Decreased strength;Decreased endurance; Impaired balance;Decreased mobility;Pain;Decreased skin integrity   Assessment Pt is very pleasant  Initially, pt able to tolerate treatment session but fatigues easily  Focus on supine to sit transfers with total of 15 supine to long sit position  Tolerated sitting EOB for approx ~18 minutes with mod-min A x 1  Compared to previous session, pt shown improvement in order to complete supine to sit transfers with repetition  Pt most limited by fatigue and weakness  Recommend STR upon discharge once medically stable  Goals   Patient Goals To get stronger   STG Expiration Date 02/10/21   Short Term Goal #1 3   PT Treatment Day 4   Plan   Treatment/Interventions Functional transfer training;ADL retraining;LE strengthening/ROM; Therapeutic exercise; Endurance training;Cognitive reorientation; Bed mobility;Gait training;Patient/family training   Progress Slow progress, decreased activity tolerance   PT Frequency 2-3x/wk   Recommendation   PT Discharge Recommendation Post-Acute Rehabilitation Services   Equipment Recommended Walker; Wheelchair  (mariely lift)   PT - OK to Discharge Yes   AM-PAC Basic Mobility Inpatient   Turning in Bed Without Bedrails 2   Lying on Back to Sitting on Edge of Flat Bed 1   Moving Bed to Chair 1   Standing Up From Chair 1   Walk in Room 1   Climb 3-5 Stairs 1   Basic Mobility Inpatient Raw Score 7   Turning Head Towards Sound 3   Follow Simple Instructions 3   Low Function Basic Mobility Raw Score 13   Low Function Basic Mobility Standardized Score 20 14       Jamal Rodas PT, DPT

## 2021-02-09 NOTE — SPEECH THERAPY NOTE
Speech Language/Pathology    Speech/Language Pathology Progress Note    Patient Name: Rosa Wen  ZKVNT'H Date: 2/9/2021       Subjective:  "Did they find a place for me"  "I need some water"  Pt awake, alert, noted improving voicing  Current Diet: level 1 puree, nt- diet not upgraded to level 2, will text  Objective:  Pt seen for trials w/ thin water by cup/straw (~6 oz slowly)  Pt educated on strategies for small sips, 2 swallows  Able to complete w/o cues for at least 70% trials  Mult swallows for all w/ only one cough at the end  He did not want trials of solids today  Assessment:  Pt w/ improved strategy use-less overt coughing w/ thin  Still requires nt for now  Ok for water w/ nsg in [employer]  Plan/Recommendations:   Will call to upgrade to level 2, keep nt  Water w/ nsg in small sips only  Oral care often

## 2021-02-10 ENCOUNTER — APPOINTMENT (INPATIENT)
Dept: RADIOLOGY | Facility: HOSPITAL | Age: 61
DRG: 853 | End: 2021-02-10
Payer: COMMERCIAL

## 2021-02-10 PROBLEM — J94.8 HYDROPNEUMOTHORAX: Status: ACTIVE | Noted: 2021-02-10

## 2021-02-10 LAB
ANION GAP SERPL CALCULATED.3IONS-SCNC: 2 MMOL/L (ref 4–13)
APPEARANCE FLD: CLEAR
ARTERIAL PATENCY WRIST A: YES
BASE EXCESS BLDA CALC-SCNC: 13 MMOL/L
BUN SERPL-MCNC: 15 MG/DL (ref 5–25)
CALCIUM SERPL-MCNC: 8.5 MG/DL (ref 8.3–10.1)
CHLORIDE SERPL-SCNC: 100 MMOL/L (ref 100–108)
CO2 SERPL-SCNC: 35 MMOL/L (ref 21–32)
COLOR FLD: NORMAL
CREAT SERPL-MCNC: 0.46 MG/DL (ref 0.6–1.3)
EOSINOPHIL NFR FLD MANUAL: 1 %
ERYTHROCYTE [DISTWIDTH] IN BLOOD BY AUTOMATED COUNT: 16.1 % (ref 11.6–15.1)
FLUAV RNA RESP QL NAA+PROBE: NEGATIVE
FLUBV RNA RESP QL NAA+PROBE: NEGATIVE
GFR SERPL CREATININE-BSD FRML MDRD: 120 ML/MIN/1.73SQ M
GLUCOSE FLD-MCNC: 91 MG/DL
GLUCOSE SERPL-MCNC: 103 MG/DL (ref 65–140)
GLUCOSE SERPL-MCNC: 106 MG/DL (ref 65–140)
GLUCOSE SERPL-MCNC: 110 MG/DL (ref 65–140)
GLUCOSE SERPL-MCNC: 121 MG/DL (ref 65–140)
GLUCOSE SERPL-MCNC: 133 MG/DL (ref 65–140)
GLUCOSE SERPL-MCNC: 99 MG/DL (ref 65–140)
HCO3 BLDA-SCNC: 38 MMOL/L (ref 22–28)
HCT VFR BLD AUTO: 29.1 % (ref 36.5–49.3)
HGB BLD-MCNC: 8.6 G/DL (ref 12–17)
LDH FLD L TO P-CCNC: 384 U/L
LYMPHOCYTES NFR BLD AUTO: 18 %
MCH RBC QN AUTO: 28.5 PG (ref 26.8–34.3)
MCHC RBC AUTO-ENTMCNC: 29.6 G/DL (ref 31.4–37.4)
MCV RBC AUTO: 96 FL (ref 82–98)
NASAL CANNULA: 5
NEUTS BAND NFR FLD MANUAL: 18 %
NEUTS SEG NFR BLD AUTO: 63 %
O2 CT BLDA-SCNC: 4.6 ML/DL (ref 16–23)
OXYHGB MFR BLDA: 37.6 % (ref 94–97)
PCO2 BLDA: 52.6 MM HG (ref 36–44)
PH BLDA: 7.48 [PH] (ref 7.35–7.45)
PH BODY FLUID: 8
PLATELET # BLD AUTO: 209 THOUSANDS/UL (ref 149–390)
PMV BLD AUTO: 10.1 FL (ref 8.9–12.7)
PO2 BLDA: 23.4 MM HG (ref 75–129)
POTASSIUM SERPL-SCNC: 3.7 MMOL/L (ref 3.5–5.3)
PROCALCITONIN SERPL-MCNC: 0.31 NG/ML
PROT FLD-MCNC: 3.9 G/DL
RBC # BLD AUTO: 3.02 MILLION/UL (ref 3.88–5.62)
RSV RNA RESP QL NAA+PROBE: NEGATIVE
SARS-COV-2 RNA RESP QL NAA+PROBE: NEGATIVE
SITE: NORMAL
SODIUM SERPL-SCNC: 137 MMOL/L (ref 136–145)
SPECIMEN SOURCE: ABNORMAL
TOTAL CELLS COUNTED SPEC: 100
WBC # BLD AUTO: 8.79 THOUSAND/UL (ref 4.31–10.16)
WBC # FLD MANUAL: 2960 /UL

## 2021-02-10 PROCEDURE — 32557 INSERT CATH PLEURA W/ IMAGE: CPT | Performed by: RADIOLOGY

## 2021-02-10 PROCEDURE — 85027 COMPLETE CBC AUTOMATED: CPT | Performed by: INTERNAL MEDICINE

## 2021-02-10 PROCEDURE — C1729 CATH, DRAINAGE: HCPCS

## 2021-02-10 PROCEDURE — 80048 BASIC METABOLIC PNL TOTAL CA: CPT | Performed by: INTERNAL MEDICINE

## 2021-02-10 PROCEDURE — 87186 SC STD MICRODIL/AGAR DIL: CPT

## 2021-02-10 PROCEDURE — 0241U HB NFCT DS VIR RESP RNA 4 TRGT: CPT | Performed by: INTERNAL MEDICINE

## 2021-02-10 PROCEDURE — 0W9B30Z DRAINAGE OF LEFT PLEURAL CAVITY WITH DRAINAGE DEVICE, PERCUTANEOUS APPROACH: ICD-10-PCS | Performed by: RADIOLOGY

## 2021-02-10 PROCEDURE — 94760 N-INVAS EAR/PLS OXIMETRY 1: CPT

## 2021-02-10 PROCEDURE — 83615 LACTATE (LD) (LDH) ENZYME: CPT | Performed by: INTERNAL MEDICINE

## 2021-02-10 PROCEDURE — 88305 TISSUE EXAM BY PATHOLOGIST: CPT | Performed by: PATHOLOGY

## 2021-02-10 PROCEDURE — C1769 GUIDE WIRE: HCPCS

## 2021-02-10 PROCEDURE — 99233 SBSQ HOSP IP/OBS HIGH 50: CPT | Performed by: INTERNAL MEDICINE

## 2021-02-10 PROCEDURE — NC001 PR NO CHARGE: Performed by: PHYSICIAN ASSISTANT

## 2021-02-10 PROCEDURE — 99152 MOD SED SAME PHYS/QHP 5/>YRS: CPT | Performed by: RADIOLOGY

## 2021-02-10 PROCEDURE — 84145 PROCALCITONIN (PCT): CPT | Performed by: INTERNAL MEDICINE

## 2021-02-10 PROCEDURE — 82805 BLOOD GASES W/O2 SATURATION: CPT | Performed by: PHYSICIAN ASSISTANT

## 2021-02-10 PROCEDURE — 88112 CYTOPATH CELL ENHANCE TECH: CPT | Performed by: PATHOLOGY

## 2021-02-10 PROCEDURE — 87205 SMEAR GRAM STAIN: CPT | Performed by: INTERNAL MEDICINE

## 2021-02-10 PROCEDURE — 82945 GLUCOSE OTHER FLUID: CPT | Performed by: INTERNAL MEDICINE

## 2021-02-10 PROCEDURE — 71045 X-RAY EXAM CHEST 1 VIEW: CPT

## 2021-02-10 PROCEDURE — 87070 CULTURE OTHR SPECIMN AEROBIC: CPT | Performed by: INTERNAL MEDICINE

## 2021-02-10 PROCEDURE — 99152 MOD SED SAME PHYS/QHP 5/>YRS: CPT

## 2021-02-10 PROCEDURE — 83986 ASSAY PH BODY FLUID NOS: CPT | Performed by: INTERNAL MEDICINE

## 2021-02-10 PROCEDURE — 84157 ASSAY OF PROTEIN OTHER: CPT | Performed by: INTERNAL MEDICINE

## 2021-02-10 PROCEDURE — 99222 1ST HOSP IP/OBS MODERATE 55: CPT | Performed by: INTERNAL MEDICINE

## 2021-02-10 PROCEDURE — 82948 REAGENT STRIP/BLOOD GLUCOSE: CPT

## 2021-02-10 PROCEDURE — 87106 FUNGI IDENTIFICATION YEAST: CPT | Performed by: INTERNAL MEDICINE

## 2021-02-10 PROCEDURE — 89051 BODY FLUID CELL COUNT: CPT | Performed by: INTERNAL MEDICINE

## 2021-02-10 PROCEDURE — 94002 VENT MGMT INPAT INIT DAY: CPT

## 2021-02-10 PROCEDURE — 36600 WITHDRAWAL OF ARTERIAL BLOOD: CPT

## 2021-02-10 RX ORDER — NALOXONE HYDROCHLORIDE 0.4 MG/ML
INJECTION, SOLUTION INTRAMUSCULAR; INTRAVENOUS; SUBCUTANEOUS
Status: COMPLETED
Start: 2021-02-10 | End: 2021-02-10

## 2021-02-10 RX ORDER — FENTANYL CITRATE 50 UG/ML
INJECTION, SOLUTION INTRAMUSCULAR; INTRAVENOUS CODE/TRAUMA/SEDATION MEDICATION
Status: COMPLETED | OUTPATIENT
Start: 2021-02-10 | End: 2021-02-10

## 2021-02-10 RX ORDER — OXYCODONE HYDROCHLORIDE 5 MG/1
2.5 TABLET ORAL EVERY 6 HOURS PRN
Status: DISCONTINUED | OUTPATIENT
Start: 2021-02-10 | End: 2021-02-15

## 2021-02-10 RX ORDER — MIDAZOLAM HYDROCHLORIDE 2 MG/2ML
INJECTION, SOLUTION INTRAMUSCULAR; INTRAVENOUS CODE/TRAUMA/SEDATION MEDICATION
Status: COMPLETED | OUTPATIENT
Start: 2021-02-10 | End: 2021-02-10

## 2021-02-10 RX ORDER — NALOXONE HYDROCHLORIDE 0.4 MG/ML
0.1 INJECTION, SOLUTION INTRAMUSCULAR; INTRAVENOUS; SUBCUTANEOUS ONCE
Status: COMPLETED | OUTPATIENT
Start: 2021-02-10 | End: 2021-02-10

## 2021-02-10 RX ADMIN — NALOXONE HYDROCHLORIDE 0.1 MG: 0.4 INJECTION, SOLUTION INTRAMUSCULAR; INTRAVENOUS; SUBCUTANEOUS at 15:18

## 2021-02-10 RX ADMIN — GABAPENTIN 400 MG: 400 CAPSULE ORAL at 09:03

## 2021-02-10 RX ADMIN — Medication 2000 UNITS: at 09:03

## 2021-02-10 RX ADMIN — MICONAZOLE NITRATE 1 APPLICATION: 20 CREAM TOPICAL at 22:41

## 2021-02-10 RX ADMIN — Medication 1 TABLET: at 09:03

## 2021-02-10 RX ADMIN — ENOXAPARIN SODIUM 70 MG: 80 INJECTION SUBCUTANEOUS at 17:22

## 2021-02-10 RX ADMIN — FENTANYL CITRATE 50 MCG: 50 INJECTION INTRAMUSCULAR; INTRAVENOUS at 12:26

## 2021-02-10 RX ADMIN — MICONAZOLE NITRATE 1 APPLICATION: 20 CREAM TOPICAL at 09:04

## 2021-02-10 RX ADMIN — OXYCODONE HYDROCHLORIDE 2.5 MG: 5 TABLET ORAL at 22:49

## 2021-02-10 RX ADMIN — MIDAZOLAM 1 MG: 1 INJECTION INTRAMUSCULAR; INTRAVENOUS at 12:26

## 2021-02-10 RX ADMIN — GABAPENTIN 400 MG: 400 CAPSULE ORAL at 22:46

## 2021-02-10 RX ADMIN — GLYCOPYRROLATE 1 MG: 1 TABLET ORAL at 22:46

## 2021-02-10 RX ADMIN — Medication 20 MG: at 06:42

## 2021-02-10 RX ADMIN — FUROSEMIDE 40 MG: 40 TABLET ORAL at 09:03

## 2021-02-10 RX ADMIN — LISINOPRIL 10 MG: 10 TABLET ORAL at 09:03

## 2021-02-10 RX ADMIN — GLYCOPYRROLATE 1 MG: 1 TABLET ORAL at 09:04

## 2021-02-10 RX ADMIN — METHADONE HYDROCHLORIDE 70 MG: 10 TABLET ORAL at 09:07

## 2021-02-10 RX ADMIN — INSULIN GLARGINE 10 UNITS: 100 INJECTION, SOLUTION SUBCUTANEOUS at 22:46

## 2021-02-10 RX ADMIN — OXYCODONE HYDROCHLORIDE 5 MG: 5 TABLET ORAL at 09:03

## 2021-02-10 NOTE — ASSESSMENT & PLAN NOTE
Patient had a fever of 101 8 previous night and 100 8 after that  None since then  Otherwise asymptomatic  No evidence of pneumonia on x-ray  Continue monitor closely for now off antibiotics  If spikes high-grade fever again, will need full infectious workup including blood cultures

## 2021-02-10 NOTE — RESTORATIVE TECHNICIAN NOTE
Restorative Specialist Mobility Note       Activity: Other (Comment)(Attempted to see pt, pt is currently off the unit )          Narendra Varela BS, Restorative Technician, [employer]

## 2021-02-10 NOTE — SPEECH THERAPY NOTE
Speech Language/Pathology  Attempted to see pt following IR however he was poorly alert, not appropriate for intervention

## 2021-02-10 NOTE — PROGRESS NOTES
PHYSICAL MEDICINE AND REHABILITATION CONSULT NOTE  Betzy Shankar 56 y o  male MRN: [id number]  [facility] Encounter: [id number]    Requested by (Physician/Service): French Garcia MD  Reason for Consultation:  Assessment of rehabilitation needs    Assessment:  Rehabilitation Diagnosis:   · Critical illness myopathy   · Right transfemoral amputation   · Pneumonia due to COVID 19  · Left foot drop  · Impaired mobility and self care    Recommendations:  Rehabilitation Plan:   Continue PT/OT (SLP) while on acute care   The patient may be a candidate for acute inpatient rehabilitation once medically stable  Will continue to follow functional progress   Covid-19 Testing: [facility][1] rehabilitation units require testing within 48 hours of potential admission for all general admissions  Please re-test if needed  *Re-testing is NOT required for patients recovering from COVID-19 infection if isolation has been discontinued per CDC criteria  Medical Co-morbidities Plan:  · Right Transfemoral amputation  · Pneumonia due to COVID-19 virus  · Left pneumothorax s/p left chest tube placement   · Sepsis  · Hypernatremia  · Diabetes with DKA  · Metabolic acidosis  · Aortic thrombus leading to need for transfemoral amputation  · Acute kidney injury  · transaminitis  · Pulmonary embolism    · Hematuria post cystoscopy  · Acute respiratory failure with hypoxia, resolving   · Anemia   · Sacral wound, DTI  · Right 2nd finger DTI  · DVT ppx:  lovenox and SCD      Thank you for this consultation  Do not hesitate to contact service with further questions  VERONICA Laureano  PM&R    History of Present Illness:  Betzy Shankar is a 56 y o  male who has had an extensive hospital stay due to [place] 19 infection testing positive on 12/31/20    His hospital course was complicated by respiratory failure with prolonged intubation, DKA, aortic thrombus and right ischemic limb s/p right AKA 1/14/21 and vocal cord dysfunction  He required extensive stay in the MICU  He was extubated on 1/25/21 and had required tube feedings however has passed a swallow evaluation and began an oral diet  NG tube has been removed and he is tolerating an oral diet  Wound care is following for sacral wounds and right hand index and 5th fingers DTIs  ENT was consulted for to hoarse voice and he was found to have vocal cord granulations  There was no intervention needed  On 2/9/21 he had increasing oxygen requirements and was febrile as well  Chest x-ray revealed left sided moderate pneumothorax for which a chest tube was placed in IR today 2/10/21  PM&R continue to follow for rehabilitation recommendations   Review of Systems: 10 point ROS unobtainable due to mental status    Function:  Prior level of function and living situation:  The patient lives with his siblings and nephews, he was independent and working full time  He reports having 1st floor set up and 1 step up to his bedroom  Current level of function:  Physical Therapy: Moderate assist for bed mobility   Occupational Therapy: Moderate assist for grooming, maximal assist for UB bathing  Speech Therapy:  Level 2 with nectar thick liquids      Physical Exam:  BP 97/53   Pulse 85   Temp 98 7 °F (37 1 °C)   Resp (!) 29   Ht 5' 6" (1 676 m)   Wt 73 7 kg (162 lb 7 7 oz)   SpO2 97%   BMI 26 22 kg/m²        Intake/Output Summary (Last 24 hours) at 2/10/2021 1350  Last data filed at 2/10/2021 1256  Gross per 24 hour   Intake --   Output 90 ml   Net -90 ml       Body mass index is 26 22 kg/m²  Physical Exam  Constitutional:       Appearance: He is ill-appearing  Interventions: He is sedated  Comments: Per nursing patient received versed and fentanyl recently due to chest tube insertion    HENT:      Head: Atraumatic  Pulmonary:      Effort: Accessory muscle usage present     Musculoskeletal:      Comments: Right AKA with staples and sutures in place, mild erythema    Skin:     Coloration: Skin is pale  Psychiatric:         Cognition and Memory: Cognition is impaired          Social History:    Social History     Socioeconomic History    Marital status:      Spouse name: Not on file    Number of children: Not on file    Years of education: Not on file    Highest education level: Not on file   Occupational History    Not on file   Social Needs    Financial resource strain: Not on file    Food insecurity     Worry: Not on file     Inability: Not on file   [employer] needs     Medical: Not on file     Non-medical: Not on file   Tobacco Use    Smoking status: Former Smoker    Smokeless tobacco: Never Used   Substance and Sexual Activity    Alcohol use: No    Drug use: No     Comment: methadone    Sexual activity: Not on file   Lifestyle    Physical activity     Days per week: Not on file     Minutes per session: Not on file    Stress: Not on file   Relationships    Social connections     Talks on phone: Not on file     Gets together: Not on file     Attends Faith service: Not on file     Active member of club or organization: Not on file     Attends meetings of clubs or organizations: Not on file     Relationship status: Not on file    Intimate partner violence     Fear of current or ex partner: Not on file     Emotionally abused: Not on file     Physically abused: Not on file     Forced sexual activity: Not on file   Other Topics Concern    Not on file   Social History Narrative    Not on file        Family History:    Family History   Problem Relation Age of Onset    Diabetes Mother     Hypertension Father     Leukemia Father     Acute lymphoblastic leukemia Father     Cancer Sister          Medications:     Current Facility-Administered Medications:     acetaminophen (TYLENOL) tablet 650 mg, 650 mg, Oral, Q6H PRN, VERONICA Kennedy, 650 mg at 02/08/21 4384    albuterol (PROVENTIL HFA,VENTOLIN HFA) inhaler 2 puff, 2 puff, Inhalation, Q4H PRN, Pepito Grajeda DO    bisacodyl (DULCOLAX) rectal suppository 10 mg, 10 mg, Rectal, Daily PRN, Shannan MENDOZA Sedora, CRNP, 10 mg at 01/21/21 2345    cholecalciferol (VITAMIN D3) tablet 2,000 Units, 2,000 Units, Oral, Daily, Shannan A Sedora, CRNP, 2,000 Units at 02/10/21 0903    Diclofenac Sodium (VOLTAREN) 1 % topical gel 4 g, 4 g, Topical, 4x Daily, Shannan A Sedora, CRNP, 4 g at 02/08/21 2139    furosemide (LASIX) tablet 40 mg, 40 mg, Oral, Daily, Pepito Grajeda DO, 40 mg at 02/10/21 0586    gabapentin (NEURONTIN) capsule 400 mg, 400 mg, Oral, TID, Shannan A Sedora, CRNP, 400 mg at 02/10/21 0903    glycopyrrolate (ROBINUL) tablet 1 mg, 1 mg, Oral, TID, Shannan MENDOZA Sedora, CRNP, 1 mg at 02/10/21 0904    insulin glargine (LANTUS) subcutaneous injection 10 Units 0 1 mL, 10 Units, Subcutaneous, HS, Lilly Wilder MD, 10 Units at 02/09/21 2120    insulin lispro (HumaLOG) 100 units/mL subcutaneous injection 1-5 Units, 1-5 Units, Subcutaneous, TID AC, 1 Units at 02/09/21 0733 **AND** Fingerstick Glucose (POCT), , , TID AC, Lilly Wilder MD    insulin lispro (HumaLOG) 100 units/mL subcutaneous injection 3 Units, 3 Units, Subcutaneous, TID With Meals, Lilly Wilder MD, 3 Units at 02/10/21 0711    Lidocaine Viscous HCl (XYLOCAINE) 2 % mucosal solution 15 mL, 15 mL, Swish & Spit, 4x Daily PRN, Shannan Cadeora, CRNP, 15 mL at 01/27/21 0800    lisinopril (ZESTRIL) tablet 10 mg, 10 mg, Oral, Daily, Shannan A Sedora, CRNP, 10 mg at 02/10/21 0903    menthol-methyl salicylate (BENGAY) 56-30 % cream, , Apply externally, 4x Daily PRN, VERONICA Kennedy, Given at 02/09/21 1126    methadone (DOLOPHINE) tablet 70 mg, 70 mg, Oral, Daily, Pepito Grajeda DO, 70 mg at 02/10/21 0907    moisture barrier miconazole 2% cream (aka SAMREEN MOISTURE BARRIER ANTIFUNGAL CREAM), 1 application, Topical, TID, Sonia Mccall DO, 1 application at 60/51/16 0904    [COMPLETED] zinc sulfate (ZINCATE) capsule 220 mg, 220 mg, Oral, Daily, 220 mg at 01/18/21 0800 **FOLLOWED BY** multivitamin-minerals (CENTRUM ADULTS) tablet 1 tablet, 1 tablet, Oral, Daily, Shannan A Sedora, CRNP, 1 tablet at 02/10/21 0903    omeprazole (PRILOSEC) suspension 2 mg/mL, 20 mg, Oral, Early Morning, Shannan A Sedora, CRNP, 20 mg at 02/10/21 8742    oxyCODONE (ROXICODONE) IR tablet 2 5 mg, 2 5 mg, Oral, Q4H PRN, Magaly E Held, PA-C    oxyCODONE (ROXICODONE) IR tablet 5 mg, 5 mg, Oral, Q4H PRN, Magaly E Held, PA-C, 5 mg at 02/10/21 0903    phenol (CHLORASEPTIC) 1 4 % mucosal liquid 1 spray, 1 spray, Mouth/Throat, Q2H PRN, Shannan A Sedora, CRNP, 1 spray at 02/01/21 2025    polyethylene glycol (MIRALAX) packet 17 g, 17 g, Oral, Daily, Shannan A Sedora, CRNP, 17 g at 02/07/21 0909    senna (SENOKOT) tablet 8 6 mg, 1 tablet, Oral, BID, Shannan A Sedora, CRNP, 8 6 mg at 02/08/21 1650    sodium chloride (OCEAN) 0 65 % nasal spray 1 spray, 1 spray, Each Nare, Q1H PRN, Jonah Merrill DO    Past Medical History:     Past Medical History:   Diagnosis Date    Diabetes mellitus ([facility] [address] )     GERD (gastroesophageal reflux disease)     Hypercholesteremia     Hypertension     Methadone use ([facility] [address] )         Past Surgical History:     Past Surgical History:   Procedure Laterality Date    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    Procedure: AMPUTATION ABOVE KNEE (AKA);   Surgeon: Irlanda Masters MD;  [facility];  Service: Vascular    AMPUTATION ABOVE KNEE (AKA) Right 1/18/2021    Procedure: AMPUTATION ABOVE KNEE (AKA) FORMALIZATION,  R AKA wound washout, wound closure;  Surgeon: Irlanda Masters MD;  [facility];  Service: Vascular    ARTERIOGRAM Right 1/13/2021    Procedure: ARTERIOGRAM;  Surgeon: Maria Del Carmen Yeung DO;  [facility];  Service: Vascular    IR LOWER EXTREMITY ANGIOGRAM  1/14/2021    THROMBECTOMY W/ EMBOLECTOMY Right 1/13/2021    Procedure: EMBOLECTOMY/THROMBECTOMY LOWER EXTREMITY; Surgeon: Jerod Orlando DO;  [facility];  Service: Vascular         Allergies: Allergies   Allergen Reactions    Varenicline            LABORATORY RESULTS:      Lab Results   Component Value Date    HGB 8 6 (L) 02/10/2021    HCT 29 1 (L) 02/10/2021    WBC 8 79 02/10/2021     Lab Results   Component Value Date    BUN 15 02/10/2021    K 3 7 02/10/2021     02/10/2021    GLUCOSE 179 (H) 01/13/2021    CREATININE 0 46 (L) 02/10/2021     Lab Results   Component Value Date    PROTIME 17 5 (H) 01/14/2021    INR 1 44 (H) 01/14/2021        DIAGNOSTIC STUDIES: Reviewed  Xr Chest Portable    Result Date: 1/13/2021  Impression: 1  Interim slight improvement in bilateral interstitial and alveolar groundglass opacification is noted consistent with Covid 19 pneumonia with possible interim improvement of superimposed edema  2   Lines and tubes as noted  Workstation performed: [id number]     Xr Chest Portable Icu    Result Date: 1/15/2021  Impression: 1  Stable opacities 2    Stable lines and tubes Workstation performed: [id number]

## 2021-02-10 NOTE — ASSESSMENT & PLAN NOTE
· Secondary to COVID 19  · Extubated 1/25  · Was on 2 L yesterday  Overnight O2 requirements worsened to 5 L  Most likely due to pneumothorax seen on chest x-ray  · Post chest tube placement patient has been very drowsy  Does wake up when called did not really answer questions or follow commands  · Stat ABG done showed mild hypercapnia and significant hypoxemia  Patient placed on BiPAP transiently for now  Hopefully we now once becomes more awake and alert  If needed repeat ABG can be done in 1-2 hours    · Per hospital protocol, COVID swab reordered and patient placed on isolation

## 2021-02-10 NOTE — OCCUPATIONAL THERAPY NOTE
Occupational Therapy Cancellation Note     02/10/21 1200   OT Last Visit   OT Visit Date 02/10/21   Note Type   Note Type Treatment   Cancel Reasons Patient off floor/test     Attempted to see patient for OT treatment session, however pt off the floor at IR  OT to continue to follow and re-attempt as appropriate      ELDER [place], OTR/L

## 2021-02-10 NOTE — QUICK NOTE
QUICK NOTE - Deterioration Index  Mary Connelly 56 y o  male MRN: [id number]  Unit/Bed#: [facility] 713-01 Encounter: [id number]    Date Paged: 02/10/21  Time Paged: [phone]  Room #: 509  Arrival Time: 1355  Deterioration index score at time of page: 41 1  %  Spoke with Dr Christine Holt from primary team  Need to escalate level of care: no     PROBLEMS resulting in high DI score:   27% Supplemental oxygen is Nasal cannula   25% Age is 56   16% [place] coma scale is 14   10% Cardiac rhythm is Sinus tachycardia   6% Pulse oximetry is 91 %       PLAN:     DI originally for SpO2 81% while he was in IR, this has resolved and his saturation is 94% on his baseline oxygen  Patient returned from IR s/p chest tube placement for hydropneumoPTX, he is now slow to respond per nursing  In IR given fentanyl and versed  Given recent sedation and nonfocal exam with patient sleepy but oriented to self and location (will not answer question for time) will obtain ABG and primary team to trend neuro exam   Discussed this plan with Dr Christine Holt on the floor who is going to reevaluate the patient now and call with any additional concerns  Please contact critical care via [employer] with any questions or concerns       Vitals:   Vitals:    02/10/21 1238 02/10/21 1239 02/10/21 1244 02/10/21 1356   BP: 103/59 101/57 97/53 121/79   Pulse: 90 90 85 89   Resp: 13 (!) 23 (!) 29 18   Temp:       TempSrc:       SpO2: 97% 98% 97% 93%   Weight:       Height:           Respiratory:  SpO2: SpO2: 93 %, SpO2 Activity: SpO2 Activity: At Rest, SpO2 Device: O2 Device: Nasal cannula  Nasal Cannula O2 Flow Rate (L/min): 3 L/min    Temperature: Temp (24hrs), Av 9 °F (37 7 °C), Min:98 7 °F (37 1 °C), Max:100 8 °F (38 2 °C)  Current: Temperature: 98 7 °F (37 1 °C)    Labs:   Results from last 7 days   Lab Units 02/10/21  0642 21  1031 21  0544   WBC Thousand/uL 8 79 8 33 7 83   HEMOGLOBIN g/dL 8 6* 8 6* 8 4*   HEMATOCRIT % 29 1* 28 1* 28 4* PLATELETS Thousands/uL 209 196 232   NEUTROS PCT %  --   --  74   MONOS PCT %  --   --  11     Results from last 7 days   Lab Units 02/10/21  0642 02/09/21  1031 02/08/21  0626   SODIUM mmol/L 137 139 138   POTASSIUM mmol/L 3 7 3 6 3 5   CHLORIDE mmol/L 100 101 100   CO2 mmol/L 35* 36* 34*   BUN mg/dL 15 14 11   CREATININE mg/dL 0 46* 0 46* 0 63   CALCIUM mg/dL 8 5 8 5 8 2*     Results from last 7 days   Lab Units 02/08/21  0626   MAGNESIUM mg/dL 1 9             Results from last 7 days   Lab Units 02/10/21  0642   PROCALCITONIN ng/ml 0 31*       Code Status: Level 1 - Full Code

## 2021-02-10 NOTE — PROGRESS NOTES
Progress Note Basilio Joe 1960, 56 y o  male MRN: [id number]    Unit/Bed#: [facility] 713-01 Encounter: [id number]    Primary Care Provider: VERONICA House   Date and time admitted to hospital: 1/13/2021  2:28 PM        * Pneumonia due to COVID-19 virus  Assessment & Plan  · Tested positive for covid 12/31/2020  · S/p completion of covid tx  · S/p completion of IV abx for possible superimposed bacterial pna  · Considered recovered; off isolation    Fever  Assessment & Plan  Patient had a fever of 101 8 previous night and 100 8 after that  None since then  Otherwise asymptomatic  No evidence of pneumonia on x-ray  Continue monitor closely for now off antibiotics  If spikes high-grade fever again, will need full infectious workup including blood cultures  Left Hydropneumothorax  Assessment & Plan  Seen on the chest x-ray this morning  Moderate-sized  Patient had worsening O2 requirements overnight to 5 L  Consulted pulmonology immediately  IR consulted and patient had urgent chest tube placement  Ischemia of right lower extremity with suspected rhabdomyolysis  Assessment & Plan  · Suspected embolic from aortic thrombus  · 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  · Continue therapeutic Lovenox   · Currently stable from a vascular surgery standpoint    Acute respiratory failure with hypoxia (HCC)  Assessment & Plan  · Secondary to COVID 19  · Extubated 1/25  · Was on 2 L yesterday  Overnight O2 requirements worsened to 5 L  Most likely due to pneumothorax seen on chest x-ray  · Post chest tube placement patient has been very drowsy  Does wake up when called did not really answer questions or follow commands  · Stat ABG done showed mild hypercapnia and significant hypoxemia  Patient placed on BiPAP transiently for now  Hopefully we now once becomes more awake and alert  If needed repeat ABG can be done in 1-2 hours    · Also administered 1 time dose of Narcan 0 1 mg  · Per hospital protocol, COVID swab reordered and patient placed on isolation    Right femoral vein DVT (HCC)  Assessment & Plan  Continue Lovenox  Was held this morning for chest tube placement  Will restart now  Type 2 diabetes mellitus, with long-term current use of insulin [facility])  Assessment & Plan  Lab Results   Component Value Date    HGBA1C 11 6 (H) 2020       Recent Labs     02/10/21  0702 02/10/21  1104 02/10/21  1359 02/10/21  1550   POCGLU 133 121 110 103       Blood Sugar Average: Last 72 hrs:  (P) 281 0314     · Continue Lantus 10 units with 3 units Humalog t i d  With meals  Continue sliding scale  Methadone maintenance therapy patient [facility])  Assessment & Plan  Chronic methadone use        VTE Pharmacologic Prophylaxis:   Pharmacologic: Enoxaparin (Lovenox)  Mechanical VTE Prophylaxis in Place: Yes    Patient Centered Rounds: I have performed bedside rounds with nursing staff today  Discussions with Specialists or Other Care Team Provider:  Pulmonology, Critical Care, IR    Education and Discussions with Family / Patient:  Called and updated nephew twice today - before and after the procedure    Time Spent for Care: 45 minutes  More than 50% of total time spent on counseling and coordination of care as described above  Current Length of Stay: 28 day(s)    Current Patient Status: Inpatient   Certification Statement: The patient will continue to require additional inpatient hospital stay due to above    Discharge Plan:     Code Status: Level 1 - Full Code      Subjective:   Pt seen and examined by me this morning  Pt was awake alert  Complained of shortness of breath  Objective:     Vitals:   Temp (24hrs), Av 6 °F (37 6 °C), Min:98 7 °F (37 1 °C), Max:100 2 °F (37 9 °C)    Temp:  [98 7 °F (37 1 °C)-100 2 °F (37 9 °C)] 98 7 °F (37 1 °C)  HR:  [] 89  Resp:  [13-29] 18  BP: ()/(51-79) 121/79  SpO2:  [92 %-100 %] 93 %  Body mass index is 26 22 kg/m²       Input and Output Summary (last 24 hours): Intake/Output Summary (Last 24 hours) at 2/10/2021 1604  Last data filed at 2/10/2021 1256  Gross per 24 hour   Intake --   Output 90 ml   Net -90 ml       Physical Exam:     Physical Exam    Constitutional: Pt appears comfortable  Not in any acute distress  Cardiovascular: Normal rate, regular rhythm, normal heart sounds  No murmur heard  Pulmonary/Chest:  Mildly increased effort, air entry decreased on left  Mild respiratory distress  Pt has no wheezes or crackles  Abdominal: Soft  Non-distended, Non-tender  Bowel sounds are normal    Musculoskeletal: Normal range of motion  Neurological: awake, alert  Moving all extremities spontaneously  Psychiatric: normal mood and affect  Additional Data:     Labs:    Results from last 7 days   Lab Units 02/10/21  0642  02/07/21  0544   WBC Thousand/uL 8 79   < > 7 83   HEMOGLOBIN g/dL 8 6*   < > 8 4*   HEMATOCRIT % 29 1*   < > 28 4*   PLATELETS Thousands/uL 209   < > 232   NEUTROS PCT %  --   --  74   LYMPHS PCT %  --   --  13*   MONOS PCT %  --   --  11   EOS PCT %  --   --  1    < > = values in this interval not displayed  Results from last 7 days   Lab Units 02/10/21  0642   SODIUM mmol/L 137   POTASSIUM mmol/L 3 7   CHLORIDE mmol/L 100   CO2 mmol/L 35*   BUN mg/dL 15   CREATININE mg/dL 0 46*   ANION GAP mmol/L 2*   CALCIUM mg/dL 8 5   GLUCOSE RANDOM mg/dL 99         Results from last 7 days   Lab Units 02/10/21  1550 02/10/21  1359 02/10/21  1104 02/10/21  0702 02/09/21  2114 02/09/21  1612 02/09/21  1114 02/09/21  0659 02/08/21  2131 02/08/21  1619 02/08/21  1040 02/08/21  0624   POC GLUCOSE mg/dl 103 110 121 133 117 125 135 188* 162* 175* 142* 131         Results from last 7 days   Lab Units 02/10/21  0642   PROCALCITONIN ng/ml 0 31*           * I Have Reviewed All Lab Data Listed Above  * Additional Pertinent Lab Tests Reviewed:  All Labs For Current Hospital Admission Reviewed    Imaging:    Imaging Reports Reviewed Today Include:   Imaging Personally Reviewed by Myself Includes:     Recent Cultures (last 7 days):           Last 24 Hours Medication List:   Current Facility-Administered Medications   Medication Dose Route Frequency Provider Last Rate    acetaminophen  650 mg Oral Q6H PRN Shannan A Sedora, CRNP      albuterol  2 puff Inhalation Q4H PRN Yesenia Merchant, DO      bisacodyl  10 mg Rectal Daily PRN Keke Ramirez, CRNP      cholecalciferol  2,000 Units Oral Daily Shannan A Sedora, CRNP      Diclofenac Sodium  4 g Topical 4x Daily Shannan A Sedora, CRNP      enoxaparin  1 mg/kg Subcutaneous Q12H [address] Serenity Mohamud MD      furosemide  40 mg Oral Daily Yesenia Merchant, DO      gabapentin  400 mg Oral TID Shannan A Sedora, CRJASPER      glycopyrrolate  1 mg Oral TID Shannan A Sedora, CRNP      insulin glargine  10 Units Subcutaneous HS Maggy Henriquez, MD      insulin lispro  1-5 Units Subcutaneous TID AC Maggy Henriquez MD      insulin lispro  3 Units Subcutaneous TID With Meals Maggy Henriquez MD      Lidocaine Viscous HCl  15 mL Swish & Spit 4x Daily PRN Shannan A Sedora, CRNP      lisinopril  10 mg Oral Daily Shannan A Sedora, CRNP      menthol-methyl salicylate   Apply externally 4x Daily PRN Keke Ramirez, VERONICA      methadone  70 mg Oral Daily Yesenia Merchant, DO      SAMREEN ANTIFUNGAL  1 application Topical TID Hetul Mccall, DO      multivitamin-minerals  1 tablet Oral Daily Shannan A Sedora, CRNP      omeprazole (PRILOSEC) suspension 2 mg/mL  20 mg Oral Early Morning Shannan A Sedora, CRNP      oxyCODONE  2 5 mg Oral Q6H PRN Serenity Mohamud MD      phenol  1 spray Mouth/Throat Q2H PRN Shannan A Sedora, CRNP      polyethylene glycol  17 g Oral Daily Shannan A Sedora, CRNP      senna  1 tablet Oral BID Shannan A Sedora, CRNP      sodium chloride  1 spray Each Nare Q1H PRN Nelia Miles DO          Today, Patient Was Seen By: Serenity Mohamud MD    ** Please Note: Dictation voice to text software may have been used in the creation of this document   **

## 2021-02-10 NOTE — ASSESSMENT & PLAN NOTE
· Tested positive for covid 12/31/2020  · S/p completion of covid tx  · S/p completion of IV abx for possible superimposed bacterial pna  · Considered recovered; off isolation

## 2021-02-10 NOTE — TELEMEDICINE
INTERPROFESSIONAL (PHONE) [phone]  Dion Foreman 56 y o  male MRN: [id number]  Unit/Bed#: [facility] 713-01 Encounter: [id number]    IR has been consulted to evaluate the patient, determine the appropriate procedure, and whether or not a procedure can and should be performed regarding the care of Swetha Agarwal  We were consulted by pulmonology concerning left hydropneumothorax, and to possibly perform a left chest tube placement if medically appropriate for the patient  IP Consult to IR  Consult performed by: Saad Draper PA-C  Consult ordered by: Owen Olmos MD        02/10/21      Assessment/Recommendation:     56year old male with pmh of COVID, right AKA 1/14 from suspected embolic aortic thrombus, intubated 1/12 to 1/25, now with left hydropneumothorax and increasing O2 requirements  Also with fever, tachycardia, sats 95% on 5L    - will plan for left chest tube placement today  - INR 1 44    Total time spent in review of data, discussion with requesting provider and rendering advice was 25 minutes       Patient or appropriate family member was verbally informed by pulmonology of this consultative service on their behalf to provide more timely access to specialty care in lieu of an in person consultation  Verbal consent was obtained  Thank you for allowing Interventional Radiology to participate in the care of Swetha Agarwal  Please don't hesitate to call or TigerText us with any questions       Saad Draper PA-C

## 2021-02-10 NOTE — BRIEF OP NOTE (RAD/CATH)
INTERVENTIONAL RADIOLOGY PROCEDURE NOTE    Date: 2/10/2021    Procedure: Left chest tube placement     Preoperative diagnosis:   1  Aortic thrombus ([facility] )    2  Acute kidney injury ([facility] )    3  Metabolic acidosis, increased anion gap    4  Lower limb ischemia    5  Limb ischemia    6  Acute metabolic encephalopathy    7  Type 2 diabetes mellitus with diabetic polyneuropathy, unspecified whether long term insulin use ([facility] )    8  Acute respiratory failure with hypoxia ([facility])    9  Pneumonia due to COVID-19 virus    10  Adrenal insufficiency ([facility] )    11  Vocal cord dysfunction    12  Pneumothorax on left         Postoperative diagnosis: Same  Surgeon: Hina Garica MD     Assistant: None  No qualified resident was available  Blood loss: Minimal    Specimens: 20 cc pleural fluid     Findings: Left hydropneumothorax, 10 F chest tube placed  Complications: None immediate      Anesthesia: conscious sedation

## 2021-02-10 NOTE — ASSESSMENT & PLAN NOTE
Lab Results   Component Value Date    HGBA1C 11 6 (H) 12/09/2020       Recent Labs     02/10/21  0702 02/10/21  1104 02/10/21  1359 02/10/21  1550   POCGLU 133 121 110 103       Blood Sugar Average: Last 72 hrs:  (P) 527 3201     · Continue Lantus 10 units with 3 units Humalog t i d  With meals  Continue sliding scale

## 2021-02-10 NOTE — ASSESSMENT & PLAN NOTE
Seen on the chest x-ray this morning  Moderate-sized  Patient had worsening O2 requirements overnight to 5 L  Consulted pulmonology immediately  IR consulted and patient had urgent chest tube placement

## 2021-02-10 NOTE — WOUND OSTOMY CARE
Progress Note - Wound   Lelan Black 56 y o  male MRN: [id number]  [facility] Encounter: [id number]        Assessment:   Patient is seen for wound follow up  Patient examined in bed and able to turn with moderate assistance  Patient is incontinent  Patient examined with primary nurse present  Findings  1  Fully evolved DTI on sacrum now presenting as an unstageable pressure injury        2  DTI on medial and lateral aspect of right hand            3  Skin tears on right arm presenting as dry intact scabs        Right heel intact    See flowsheets for details      Skin care plans:   1-Apply TRIAD paste to sacro-buttocks daily  2-Elevate heel to offload pressure  3-Ehob cushion in chair when out of bed  4-Moisturize skin daily with skin nourishing cream    5-Turn/reposition q2h or when medically stable for pressure re-distribution on skin  6-Allevyn foam to heel, juan w/P, peel foam check skin integrity q-shift  Change q3d  7-Apply 3M skin protectant to right forearm scabs daily  8-Low air loss mattress        Call or tigertext with any questions  Wound Care will continue to follow    Wound 01/12/21 Pressure Injury Sacrum Mid (Active)   Wound Image   02/10/21 1333   Wound Description Slough; White;Yellow;Fragile;Light purple;Granulation tissue; Epithelialization 02/10/21 1333   Pressure Injury Stage U 02/10/21 1333   Renuka-wound Assessment Clean;Dry;Fragile;Pink 02/10/21 1333   Wound Length (cm) 9 cm 02/10/21 1333   Wound Width (cm) 6 cm 02/10/21 1333   Wound Surface Area (cm^2) 54 cm^2 02/10/21 1333   Wound Site Closure None 02/08/21 2000   Drainage Amount Small 02/10/21 1333   Drainage Description Serous 02/10/21 1333   Treatments Cleansed 02/10/21 1333   Dressing Protective barrier 02/10/21 1333   Dressing Changed Changed 02/01/21 1750   Patient Tolerance Tolerated well 02/08/21 0900   Dressing Status Clean;Dry; Intact 02/10/21 0715       Wound 01/19/21 Skin Tear Skin tear Arm Right (Active) Wound Image   02/10/21 1345   Wound Description Fragile 02/10/21 0715   Pressure Injury Stage O 02/03/21 1424   Renuka-wound Assessment Intact 02/08/21 2000   Wound Length (cm) 2 2 cm 02/03/21 1424   Wound Width (cm) 1 cm 02/03/21 1424   Wound Depth (cm) 0 1 cm 02/03/21 1424   Wound Surface Area (cm^2) 2 2 cm^2 02/03/21 1424   Wound Volume (cm^3) 0 22 cm^3 02/03/21 1424   Calculated Wound Volume (cm^3) 0 22 cm^3 02/03/21 1424   Wound Site Closure Open to air 02/10/21 0715   Drainage Amount Small 02/04/21 2000   Drainage Description Serous 02/04/21 2000   Treatments Elevated 02/03/21 2000   Dressing Open to air 02/10/21 0715   Patient Tolerance Tolerated well 02/03/21 1424   Dressing Status Clean; Intact;Dry 02/10/21 0715       Wound 01/19/21 Pressure Injury Finger (Comment which one) Right;Posterior (Active)   Wound Image   02/10/21 1344   Wound Description Clean;Dry; Intact; Light purple 02/10/21 1344   Pressure Injury Stage DTPI 02/10/21 1344   Renuka-wound Assessment Clean; Intact;Dry 02/10/21 1344   Wound Length (cm) 1 7 cm 02/10/21 1344   Wound Width (cm) 2 cm 02/10/21 1344   Wound Surface Area (cm^2) 3 4 cm^2 02/10/21 1344   Wound Site Closure Open to air 02/08/21 2000   Drainage Amount None 02/08/21 2000   Treatments Elevated 02/03/21 2000   Dressing Open to air 02/10/21 1344   Patient Tolerance Tolerated well 02/03/21 1435   Dressing Status Clean;Dry; Intact 02/10/21 0715       Wound 02/03/21 Pressure Injury Finger (Comment which one) Posterior;Right (Active)   Wound Image   02/10/21 1342   Wound Description Clean;Dry; Intact; Light purple 02/10/21 1342   Pressure Injury Stage DTPI 02/10/21 1342   Renuka-wound Assessment Clean;Dry; Intact 02/10/21 1342   Wound Length (cm) 0 8 cm 02/10/21 1342   Wound Width (cm) 1 cm 02/10/21 1342   Wound Surface Area (cm^2) 0 8 cm^2 02/10/21 1342   Drainage Amount None 02/08/21 2000   Dressing Open to air 02/10/21 1342   Patient Tolerance Tolerated well 02/03/21 4669

## 2021-02-10 NOTE — CONSULTS
Consult Note - Pulmonary   Whitney Roman 56 y o  male MRN: [id number]  Unit/Bed#: [facility] 713-01 Encounter: [id number]      Reason for consultation: left pneumothorax, increasing O2 requirements    Requesting physician: Mariano Osborn MD    Assessment:    1  Acute hypoxic respiratory failure- secondary to look moderate pneumothorax superimposed on bilateral ground-glass opacification related to prior COVID infection  2  Left moderate pneumothorax- appears to be a primary spontaneous pneumothorax  Moderate on imaging  Patient is on therapeutic Lovenox  No evidence of trauma  Longstanding smoking history and therefore may have had some small bulla  3  COVID-19 pneumonia- initially diagnosed on 12/31/2020 and was asymptomatic  Returned on 01/11 with symptoms of SOB  Was treated per the algorithm  He did require intubation on 01/12/2021 and was extubated on 01/25/2021   4  History of tobacco abuse-smoked for 22 years and quit 7 years ago  Reports smoking 1 pack per day  5  Hepatitis-C  6  Right lower extremity emboli-status post embolectomy and below-the-knee amputation    Plan:    · Consult interventional Radiology for left chest tube placement  · Hold therapeutic Lovenox for now unless okay by IR  · Supplemental O2 to help with resorption of pneumothorax  Was on 5 L nasal cannula with O2 sat 92%  · Albuterol 2 puffs q 6 hours p r n  · Lasix daily    Case was and to be discussed with Dr Vernon Melvin  History of Present Illness   HPI:  Whitney Roman is a 56 y o  male w/ a PMH of DM, left below-the-knee amputation, right lower extremity emboli requiring embolectomy  who initially presented to the emergency department 12 days after being diagnosed with COVID  He was being treated per the protocol and required intubation from January 12 to January 25th  Afterwards he did relatively well and was awaiting placement  However yesterday he began to have increasing oxygen requirements    A chest x-ray was obtained yesterday that revealed a left-sided moderate pneumothorax  Pulmonary was consulted for further evaluation of the pneumothorax  Patient denies ever having a pneumothorax in the past and none that occurred within the family  Patient is not on oxygen at home  He denies any chest wall trauma  He denies a severe cough  Minimal shortness of breath and mild chest pain  Patient is a 22 pack years smoking history and quit 7 years ago, he denies drinking any alcohol but reports he used to use heroin last time was 24 years ago  He was previously working as a     Vitals reviewed patient did have a fever yesterday of 101 1, tachycardic, hemodynamically stable with O2 sat of 95% on 5 L nasal cannula  Labs reviewed which show elevated bicarb, normocytic anemia  Procalcitonin was last checked on January 25th and was 0 57 and today it is 0 31  CRP has increased recently from 57 and 213  D-dimer is elevated at 1 49  Blood cultures negative to date  Sputum culture on January 23rd with mixed respiratory maria de jesus  Strep and Legionella urine antigens negative  Review of Systems   Constitutional: Negative for chills, diaphoresis, fatigue and fever  HENT: Negative for congestion, ear pain, postnasal drip, rhinorrhea, sneezing, trouble swallowing and voice change  Eyes: Negative for redness and itching  Respiratory: Positive for cough and shortness of breath  Negative for apnea, choking, chest tightness, wheezing and stridor  Cardiovascular: Positive for chest pain  Negative for palpitations and leg swelling  Gastrointestinal: Negative for abdominal distention, abdominal pain, blood in stool, constipation, diarrhea, nausea and vomiting  Endocrine: Negative for polydipsia and polyuria  Genitourinary: Negative for dysuria and flank pain  Musculoskeletal: Negative for arthralgias, back pain and joint swelling  Skin: Negative for pallor and rash     Neurological: Negative for dizziness, syncope, weakness, light-headedness, numbness and headaches  Hematological: Negative for adenopathy  Psychiatric/Behavioral: Negative for agitation  Historical Information   Past Medical History:   Diagnosis Date    Diabetes mellitus ([facility] )     GERD (gastroesophageal reflux disease)     Hypercholesteremia     Hypertension     Methadone use ([facility] )      Past Surgical History:   Procedure Laterality Date    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    Procedure: AMPUTATION ABOVE KNEE (AKA);   Surgeon: Lena Torres MD;  [facility]: [facility];  Service: Vascular    AMPUTATION ABOVE KNEE (AKA) Right 1/18/2021    Procedure: AMPUTATION ABOVE KNEE (AKA) FORMALIZATION,  R AKA wound washout, wound closure;  Surgeon: Lena Torres MD;  [facility]: [facility];  Service: Vascular    ARTERIOGRAM Right 1/13/2021    Procedure: ARTERIOGRAM;  Surgeon: Emre Harper DO;  [facility];  Service: Vascular    IR LOWER EXTREMITY ANGIOGRAM  1/14/2021    THROMBECTOMY W/ EMBOLECTOMY Right 1/13/2021    Procedure: EMBOLECTOMY/THROMBECTOMY LOWER EXTREMITY;  Surgeon: Emre Harper DO;  [facility];  Service: Vascular     Family History   Problem Relation Age of Onset    Diabetes Mother     Hypertension Father     Leukemia Father     Acute lymphoblastic leukemia Father     Cancer Sister        Occupational History:  Previously a     Social History:  Smoked cigarettes 1 pack per day for 22 years and quit 7 years ago, denies any alcohol use, reports using heroin 24 years ago    Meds/Allergies   Current Facility-Administered Medications   Medication Dose Route Frequency    acetaminophen (TYLENOL) tablet 650 mg  650 mg Oral Q6H PRN    albuterol (PROVENTIL HFA,VENTOLIN HFA) inhaler 2 puff  2 puff Inhalation Q4H PRN    bisacodyl (DULCOLAX) rectal suppository 10 mg  10 mg Rectal Daily PRN    cholecalciferol (VITAMIN D3) tablet 2,000 Units  2,000 Units Oral Daily    Diclofenac Sodium (VOLTAREN) 1 % topical gel 4 g  4 g Topical 4x Daily    enoxaparin (LOVENOX) subcutaneous injection 80 mg  1 mg/kg Subcutaneous Q12H [address]    furosemide (LASIX) tablet 40 mg  40 mg Oral Daily    gabapentin (NEURONTIN) capsule 400 mg  400 mg Oral TID    glycopyrrolate (ROBINUL) tablet 1 mg  1 mg Oral TID    insulin glargine (LANTUS) subcutaneous injection 10 Units 0 1 mL  10 Units Subcutaneous HS    insulin lispro (HumaLOG) 100 units/mL subcutaneous injection 1-5 Units  1-5 Units Subcutaneous TID AC    insulin lispro (HumaLOG) 100 units/mL subcutaneous injection 3 Units  3 Units Subcutaneous TID With Meals    Lidocaine Viscous HCl (XYLOCAINE) 2 % mucosal solution 15 mL  15 mL Swish & Spit 4x Daily PRN    lisinopril (ZESTRIL) tablet 10 mg  10 mg Oral Daily    menthol-methyl salicylate (BENGAY) 22-99 % cream   Apply externally 4x Daily PRN    methadone (DOLOPHINE) tablet 70 mg  70 mg Oral Daily    moisture barrier miconazole 2% cream (aka SAMREEN MOISTURE BARRIER ANTIFUNGAL CREAM)  1 application Topical TID    multivitamin-minerals (CENTRUM ADULTS) tablet 1 tablet  1 tablet Oral Daily    omeprazole (PRILOSEC) suspension 2 mg/mL  20 mg Oral Early Morning    oxyCODONE (ROXICODONE) IR tablet 2 5 mg  2 5 mg Oral Q4H PRN    oxyCODONE (ROXICODONE) IR tablet 5 mg  5 mg Oral Q4H PRN    phenol (CHLORASEPTIC) 1 4 % mucosal liquid 1 spray  1 spray Mouth/Throat Q2H PRN    polyethylene glycol (MIRALAX) packet 17 g  17 g Oral Daily    senna (SENOKOT) tablet 8 6 mg  1 tablet Oral BID    sodium chloride (OCEAN) 0 65 % nasal spray 1 spray  1 spray Each Nare Q1H PRN     Medications Prior to Admission   Medication    Blood Glucose Monitoring Suppl (ONETOUCH VERIO) w/Device KIT    Empagliflozin (Jardiance) 25 MG TABS    ergocalciferol (VITAMIN D2) 50,000 units    erythromycin (ILOTYCIN) ophthalmic ointment    furosemide (LASIX) 40 mg tablet    gabapentin (NEURONTIN) 100 mg capsule    glucose blood (OneTouch Verio) test strip    insulin glargine (LANTUS) 100 units/mL subcutaneous injection    insulin NPH-insulin regular (NovoLIN 70/30) 100 units/mL subcutaneous injection    Insulin Pen Needle 31G X 5 MM MISC    Insulin Pen Needle 31G X 5 MM MISC    isosorbide mononitrate (IMDUR) 120 mg 24 hr tablet    lisinopril-hydrochlorothiazide (PRINZIDE,ZESTORETIC) 20-25 MG per tablet    methadone (DOLOPHINE) 10 MG/5ML solution    omeprazole (PriLOSEC) 20 mg delayed release capsule    ondansetron (ZOFRAN-ODT) 4 mg disintegrating tablet    potassium chloride (K-DUR,KLOR-CON) 20 mEq tablet    pravastatin (PRAVACHOL) 40 mg tablet    promethazine (PHENERGAN) 25 mg tablet    sitaGLIPtin-metFORMIN (JANUMET)  MG per tablet     Allergies   Allergen Reactions    Varenicline        Vitals:    02/09/21 1532 02/09/21 2130 02/09/21 2143 02/10/21 0840   BP: 110/72 131/78 141/77 142/76   Pulse: 95 103 96 95   Resp: 16  18    Temp: (!) 100 8 °F (38 2 °C) 100 2 °F (37 9 °C) 99 8 °F (37 7 °C) 98 7 °F (37 1 °C)   TempSrc:       SpO2: 95% 96% 94% 92%   Weight:       Height:           Physical Exam  Constitutional:       General: He is not in acute distress  Appearance: He is not diaphoretic  HENT:      Head: Normocephalic and atraumatic  Comments: Epistaxis noted     Nose: Nose normal       Mouth/Throat:      Pharynx: No oropharyngeal exudate  Eyes:      General: No scleral icterus  Conjunctiva/sclera: Conjunctivae normal       Pupils: Pupils are equal, round, and reactive to light  Neck:      Musculoskeletal: Normal range of motion and neck supple  Thyroid: No thyromegaly  Vascular: No JVD  Trachea: No tracheal deviation  Cardiovascular:      Rate and Rhythm: Normal rate and regular rhythm  Heart sounds: Normal heart sounds  No murmur  No friction rub  No gallop  Pulmonary:      Effort: Pulmonary effort is normal  No respiratory distress        Breath sounds: Normal breath sounds  No stridor  No wheezing or rales  Comments: Decreased left-sided vesicular breath sounds  Abdominal:      General: Bowel sounds are normal  There is no distension  Palpations: Abdomen is soft  Tenderness: There is no abdominal tenderness  There is no guarding or rebound  Musculoskeletal: Normal range of motion  General: No deformity  Comments: Right leg below-the-knee amputation noted   Lymphadenopathy:      Cervical: No cervical adenopathy  Skin:     General: Skin is warm  Findings: No erythema or rash  Neurological:      Mental Status: He is alert and oriented to person, place, and time  Cranial Nerves: No cranial nerve deficit  Sensory: No sensory deficit  Labs: I have personally reviewed pertinent lab results  Results from last 7 days   Lab Units 02/10/21  0642 02/09/21  1031 02/07/21  0544   WBC Thousand/uL 8 79 8 33 7 83   HEMOGLOBIN g/dL 8 6* 8 6* 8 4*   HEMATOCRIT % 29 1* 28 1* 28 4*   PLATELETS Thousands/uL 209 196 232   NEUTROS PCT %  --   --  74   MONOS PCT %  --   --  11      Results from last 7 days   Lab Units 02/10/21  0642 02/09/21  1031 02/08/21  0626   POTASSIUM mmol/L 3 7 3 6 3 5   CHLORIDE mmol/L 100 101 100   CO2 mmol/L 35* 36* 34*   BUN mg/dL 15 14 11   CREATININE mg/dL 0 46* 0 46* 0 63   CALCIUM mg/dL 8 5 8 5 8 2*     Results from last 7 days   Lab Units 02/08/21  0626   MAGNESIUM mg/dL 1 9                  0   Lab Value Date/Time    TROPONINI <0 03 01/11/2021 1020    TROPONINI <0 03 01/06/2021 0557    TROPONINI <0 03 12/31/2020 1955    TROPONINI <0 02 05/21/2020 1517    TROPONINI <0 02 05/21/2020 1228       Imaging and other studies: I have personally reviewed pertinent reports  and I have personally reviewed pertinent films in PACS  CXR- bilateral opacifications, moderate left pneumothorax    Pulmonary function testing: none on file    EKG, Pathology, and Other Studies: I have personally reviewed pertinent reports     and I have personally reviewed pertinent films in PACS  Echo 01/12/2021- LVEF 70%, normal RV, no significant valvular heart disease    Code Status: Level 1 - Full Code      Sarah Arregiun MD  Pulmonary & Critical Care Fellow, [facility]

## 2021-02-10 NOTE — CASE MANAGEMENT
CM noted that PM and R's recommendation is acute rehab for pt  CM called and spoke with pt's nephew Bryan Pearce to discuss dcp  Bryan Pearce was agreeable for CM to send referrals to [facility], [facility] and [facility], referrals in [place]  A post acute care recommendation was made by your care team for Acute Rehab  Discussed [place] [facility] with caregiver  List of facilities given to caregiver via in phone  caregiver aware the list is custom filtered for them by zip code location and that [facility] post acute providers are designated

## 2021-02-10 NOTE — SEDATION DOCUMENTATION
10fr chest tube placed on left by Dr Margaret Rosa without complications  Tolerated well by patient  VSS  90ml of serosanguinous fluid removed on insertion

## 2021-02-11 ENCOUNTER — APPOINTMENT (INPATIENT)
Dept: RADIOLOGY | Facility: HOSPITAL | Age: 61
DRG: 853 | End: 2021-02-11
Attending: INTERNAL MEDICINE
Payer: COMMERCIAL

## 2021-02-11 ENCOUNTER — APPOINTMENT (INPATIENT)
Dept: RADIOLOGY | Facility: HOSPITAL | Age: 61
DRG: 853 | End: 2021-02-11
Payer: COMMERCIAL

## 2021-02-11 LAB
ANION GAP SERPL CALCULATED.3IONS-SCNC: 3 MMOL/L (ref 4–13)
BUN SERPL-MCNC: 13 MG/DL (ref 5–25)
CALCIUM SERPL-MCNC: 8 MG/DL (ref 8.3–10.1)
CHLORIDE SERPL-SCNC: 100 MMOL/L (ref 100–108)
CO2 SERPL-SCNC: 35 MMOL/L (ref 21–32)
CREAT SERPL-MCNC: 0.4 MG/DL (ref 0.6–1.3)
ERYTHROCYTE [DISTWIDTH] IN BLOOD BY AUTOMATED COUNT: 16.1 % (ref 11.6–15.1)
GFR SERPL CREATININE-BSD FRML MDRD: 127 ML/MIN/1.73SQ M
GLUCOSE SERPL-MCNC: 105 MG/DL (ref 65–140)
GLUCOSE SERPL-MCNC: 116 MG/DL (ref 65–140)
GLUCOSE SERPL-MCNC: 166 MG/DL (ref 65–140)
GLUCOSE SERPL-MCNC: 183 MG/DL (ref 65–140)
GLUCOSE SERPL-MCNC: 65 MG/DL (ref 65–140)
GLUCOSE SERPL-MCNC: 96 MG/DL (ref 65–140)
HCT VFR BLD AUTO: 24.3 % (ref 36.5–49.3)
HGB BLD-MCNC: 7.3 G/DL (ref 12–17)
MCH RBC QN AUTO: 28.4 PG (ref 26.8–34.3)
MCHC RBC AUTO-ENTMCNC: 30 G/DL (ref 31.4–37.4)
MCV RBC AUTO: 95 FL (ref 82–98)
PLATELET # BLD AUTO: 231 THOUSANDS/UL (ref 149–390)
PMV BLD AUTO: 10.4 FL (ref 8.9–12.7)
POTASSIUM SERPL-SCNC: 3.7 MMOL/L (ref 3.5–5.3)
PROCALCITONIN SERPL-MCNC: 0.27 NG/ML
RBC # BLD AUTO: 2.57 MILLION/UL (ref 3.88–5.62)
SODIUM SERPL-SCNC: 138 MMOL/L (ref 136–145)
WBC # BLD AUTO: 8.24 THOUSAND/UL (ref 4.31–10.16)

## 2021-02-11 PROCEDURE — 85027 COMPLETE CBC AUTOMATED: CPT | Performed by: INTERNAL MEDICINE

## 2021-02-11 PROCEDURE — 94760 N-INVAS EAR/PLS OXIMETRY 1: CPT

## 2021-02-11 PROCEDURE — 71045 X-RAY EXAM CHEST 1 VIEW: CPT

## 2021-02-11 PROCEDURE — 99232 SBSQ HOSP IP/OBS MODERATE 35: CPT | Performed by: INTERNAL MEDICINE

## 2021-02-11 PROCEDURE — 92526 ORAL FUNCTION THERAPY: CPT

## 2021-02-11 PROCEDURE — 80048 BASIC METABOLIC PNL TOTAL CA: CPT | Performed by: INTERNAL MEDICINE

## 2021-02-11 PROCEDURE — 84145 PROCALCITONIN (PCT): CPT | Performed by: INTERNAL MEDICINE

## 2021-02-11 PROCEDURE — 99024 POSTOP FOLLOW-UP VISIT: CPT | Performed by: NURSE PRACTITIONER

## 2021-02-11 PROCEDURE — 82948 REAGENT STRIP/BLOOD GLUCOSE: CPT

## 2021-02-11 RX ADMIN — POLYETHYLENE GLYCOL 3350 17 G: 17 POWDER, FOR SOLUTION ORAL at 12:52

## 2021-02-11 RX ADMIN — Medication 2000 UNITS: at 12:51

## 2021-02-11 RX ADMIN — MICONAZOLE NITRATE 1 APPLICATION: 20 CREAM TOPICAL at 21:42

## 2021-02-11 RX ADMIN — DICLOFENAC SODIUM 4 G: 10 GEL TOPICAL at 21:41

## 2021-02-11 RX ADMIN — FUROSEMIDE 40 MG: 40 TABLET ORAL at 12:52

## 2021-02-11 RX ADMIN — DICLOFENAC SODIUM 4 G: 10 GEL TOPICAL at 12:51

## 2021-02-11 RX ADMIN — GABAPENTIN 400 MG: 400 CAPSULE ORAL at 12:51

## 2021-02-11 RX ADMIN — MICONAZOLE NITRATE 1 APPLICATION: 20 CREAM TOPICAL at 12:54

## 2021-02-11 RX ADMIN — ENOXAPARIN SODIUM 70 MG: 80 INJECTION SUBCUTANEOUS at 10:15

## 2021-02-11 RX ADMIN — METHADONE HYDROCHLORIDE 70 MG: 10 TABLET ORAL at 12:52

## 2021-02-11 RX ADMIN — SENNOSIDES 8.6 MG: 8.6 TABLET, FILM COATED ORAL at 17:21

## 2021-02-11 RX ADMIN — LISINOPRIL 10 MG: 10 TABLET ORAL at 12:52

## 2021-02-11 RX ADMIN — GLYCOPYRROLATE 1 MG: 1 TABLET ORAL at 21:41

## 2021-02-11 RX ADMIN — GLYCOPYRROLATE 1 MG: 1 TABLET ORAL at 12:53

## 2021-02-11 RX ADMIN — INSULIN GLARGINE 10 UNITS: 100 INJECTION, SOLUTION SUBCUTANEOUS at 21:41

## 2021-02-11 RX ADMIN — DICLOFENAC SODIUM 4 G: 10 GEL TOPICAL at 17:21

## 2021-02-11 RX ADMIN — SENNOSIDES 8.6 MG: 8.6 TABLET, FILM COATED ORAL at 12:52

## 2021-02-11 RX ADMIN — GLYCOPYRROLATE 1 MG: 1 TABLET ORAL at 17:21

## 2021-02-11 RX ADMIN — ENOXAPARIN SODIUM 70 MG: 80 INJECTION SUBCUTANEOUS at 21:41

## 2021-02-11 RX ADMIN — GABAPENTIN 400 MG: 400 CAPSULE ORAL at 17:21

## 2021-02-11 RX ADMIN — GABAPENTIN 400 MG: 400 CAPSULE ORAL at 21:41

## 2021-02-11 RX ADMIN — DICLOFENAC SODIUM 4 G: 10 GEL TOPICAL at 10:15

## 2021-02-11 RX ADMIN — INSULIN LISPRO 1 UNITS: 100 INJECTION, SOLUTION INTRAVENOUS; SUBCUTANEOUS at 17:22

## 2021-02-11 RX ADMIN — MICONAZOLE NITRATE 1 APPLICATION: 20 CREAM TOPICAL at 17:22

## 2021-02-11 RX ADMIN — Medication 1 TABLET: at 12:51

## 2021-02-11 NOTE — PLAN OF CARE
Problem: Prexisting or High Potential for Compromised Skin Integrity  Goal: Skin integrity is maintained or improved  Description: INTERVENTIONS:  - Identify patients at risk for skin breakdown  - Assess and monitor skin integrity  - Assess and monitor nutrition and hydration status  - Monitor labs   - Assess for incontinence   - Turn and reposition patient  - Assist with mobility/ambulation  - Relieve pressure over bony prominences  - Avoid friction and shearing  - Provide appropriate hygiene as needed including keeping skin clean and dry  - Evaluate need for skin moisturizer/barrier cream  - Collaborate with interdisciplinary team   - Patient/family teaching  - Consider wound care consult   Outcome: Progressing     Problem: Potential for Falls  Goal: Patient will remain free of falls  Description: INTERVENTIONS:  - Assess patient frequently for physical needs  -  Identify cognitive and physical deficits and behaviors that affect risk of falls  -  [place] fall precautions as indicated by assessment   - Educate patient/family on patient safety including physical limitations  - Instruct patient to call for assistance with activity based on assessment  - Modify environment to reduce risk of injury  - Consider OT/PT consult to assist with strengthening/mobility  Outcome: Progressing     Problem: Nutrition/Hydration-ADULT  Goal: Nutrient/Hydration intake appropriate for improving, restoring or maintaining nutritional needs  Description: Monitor and assess patient's nutrition/hydration status for malnutrition  Collaborate with interdisciplinary team and initiate plan and interventions as ordered  Monitor patient's weight and dietary intake as ordered or per policy  Utilize nutrition screening tool and intervene as necessary  Determine patient's food preferences and provide high-protein, high-caloric foods as appropriate       INTERVENTIONS:  - Monitor oral intake, urinary output, labs, and treatment plans  - Assess nutrition and hydration status and recommend course of action  - Evaluate amount of meals eaten  - Assist patient with eating if necessary   - Allow adequate time for meals  - Recommend/ encourage appropriate diets, oral nutritional supplements, and vitamin/mineral supplements  - Order, calculate, and assess calorie counts as needed  - Assess need for intravenous fluids  - Provide specific nutrition/hydration education as appropriate  - Include patient/family/caregiver in decisions related to nutrition  Outcome: Progressing     Problem: PAIN - ADULT  Goal: Verbalizes/displays adequate comfort level or baseline comfort level  Description: Interventions:  - Encourage patient to monitor pain and request assistance  - Assess pain using appropriate pain scale  - Administer analgesics based on type and severity of pain and evaluate response  - Implement non-pharmacological measures as appropriate and evaluate response  - Consider cultural and social influences on pain and pain management  - Notify physician/advanced practitioner if interventions unsuccessful or patient reports new pain  Outcome: Progressing     Problem: INFECTION - ADULT  Goal: Absence or prevention of progression during hospitalization  Description: INTERVENTIONS:  - Assess and monitor for signs and symptoms of infection  - Monitor lab/diagnostic results  - Monitor all insertion sites, i e  indwelling lines, tubes, and drains  - [employer] appropriate cooling/warming therapies per order  - Administer medications as ordered  - Instruct and encourage patient and family to use good hand hygiene technique  - Identify and instruct in appropriate isolation precautions for identified infection/condition  Outcome: Progressing  Goal: Absence of fever/infection during neutropenic period  Description: INTERVENTIONS:  - Monitor WBC    Outcome: Progressing     Problem: SAFETY ADULT  Goal: Patient will remain free of falls  Description: INTERVENTIONS:  - Assess patient frequently for physical needs  -  Identify cognitive and physical deficits and behaviors that affect risk of falls    -  [place] fall precautions as indicated by assessment   - Educate patient/family on patient safety including physical limitations  - Instruct patient to call for assistance with activity based on assessment  - Modify environment to reduce risk of injury  - Consider OT/PT consult to assist with strengthening/mobility  Outcome: Progressing  Goal: Maintain or return to baseline ADL function  Description: INTERVENTIONS:  -  Assess patient's ability to carry out ADLs; assess patient's baseline for ADL function and identify physical deficits which impact ability to perform ADLs (bathing, care of mouth/teeth, toileting, grooming, dressing, etc )  - Assess/evaluate cause of self-care deficits   - Assess range of motion  - Assess patient's mobility; develop plan if impaired  - Assess patient's need for assistive devices and provide as appropriate  - Encourage maximum independence but intervene and supervise when necessary  - Involve family in performance of ADLs  - Assess for home care needs following discharge   - Consider OT consult to assist with ADL evaluation and planning for discharge  - Provide patient education as appropriate  Outcome: Progressing  Goal: Maintain or return mobility status to optimal level  Description: INTERVENTIONS:  - Assess patient's baseline mobility status (ambulation, transfers, stairs, etc )    - Identify cognitive and physical deficits and behaviors that affect mobility  - Identify mobility aids required to assist with transfers and/or ambulation (gait belt, sit-to-stand, lift, walker, cane, etc )  - [place] fall precautions as indicated by assessment  - Record patient progress and toleration of activity level on Mobility SBAR; progress patient to next Phase/Stage  - Instruct patient to call for assistance with activity based on assessment  - Consider rehabilitation consult to assist with strengthening/weightbearing, etc   Outcome: Progressing     Problem: DISCHARGE PLANNING  Goal: Discharge to home or other facility with appropriate resources  Description: INTERVENTIONS:  - Identify barriers to discharge w/patient and caregiver  - Arrange for needed discharge resources and transportation as appropriate  - Identify discharge learning needs (meds, wound care, etc )  - Arrange for interpretive services to assist at discharge as needed  - Refer to Case Management Department for coordinating discharge planning if the patient needs post-hospital services based on physician/advanced practitioner order or complex needs related to functional status, cognitive ability, or social support system  Outcome: Progressing     Problem: Knowledge Deficit  Goal: Patient/family/caregiver demonstrates understanding of disease process, treatment plan, medications, and discharge instructions  Description: Complete learning assessment and assess knowledge base    Interventions:  - Provide teaching at level of understanding  - Provide teaching via preferred learning methods  Outcome: Progressing

## 2021-02-11 NOTE — PROGRESS NOTES
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, intubated 1/12 to 1/25, now with left hydropneumothorax and increasing O2 requirements and had a 10f left chest tube placed by IR on 2/11    24 hour output 375 mL serosanguinous drainage  O2 demands now 3L today       Review of CXR shows patient still shows ptx, patient may have some element of trapped lung      - appreciate pulmonary input   - continue to wall suction   - will continue to follow up cultures     Patient Active Problem List   Diagnosis    Chronic hepatitis C without hepatic coma ([facility] )    HTN (hypertension)    Hyperlipidemia associated with type 2 diabetes mellitus ([facility] )    Methadone maintenance therapy patient ([place] [address] )    Type 2 diabetes mellitus with diabetic polyneuropathy ([facility] )    Callous ulcer, limited to breakdown of skin ([facility] )    Bilateral lower extremity edema    BMI 28 0-28 9,adult    Positive depression screening    Mononeuropathy, unspecified    Other and unspecified noninfectious gastroenteritis and colitis    Mixed hyperlipidemia    Proteinuria    Tobacco abuse counseling    Vitamin D deficiency    SOB (shortness of breath) on exertion    Type 2 diabetes mellitus, with long-term current use of insulin (HCC)    Acute respiratory failure with hypoxia (HCC)    Sepsis due to COVID-19 ([facility] )    Pneumonia due to COVID-19 virus    Acute pancreatitis    Hematuria    Aortic thrombus (HCC)    Anemia    Myopathy    Oropharyngeal dysphagia    Edema of right upper extremity    Ischemia of right lower extremity with suspected rhabdomyolysis    Urinary retention    Right femoral vein DVT (HCC)    Fever    Left Hydropneumothorax          Subjective: Leonia Harada is a 56 y o  male who presented with N, V, DKA, covid infection on 1/13  Patient feels better since his chest tube placement and his breathing is better except he has a slight cough  Objective:    Vitals:  /73   Pulse 84   Temp 98 4 °F (36 9 °C)   Resp 21   Ht 5' 6" (1 676 m)   Wt 73 7 kg (162 lb 7 7 oz)   SpO2 97%   BMI 26 22 kg/m²   Body mass index is 26 22 kg/m²  Weight (last 2 days)     Date/Time   Weight    02/11/21 0600   --    Weight: patients bed not zeroed at 02/11/21 0600              I/Os:    Intake/Output Summary (Last 24 hours) at 2/11/2021 1612  Last data filed at 2/11/2021 1310  Gross per 24 hour   Intake 0 ml   Output 495 ml   Net -495 ml       Invasive Devices     Peripheral Intravenous Line            Peripheral IV 02/11/21 Left;Ventral (anterior) Forearm less than 1 day          Drain            External Urinary Catheter Small 4 days    Chest Tube 1 Left Midaxillary;Pleural 10 Fr  1 day                Physical Exam:  Physical Exam  Vitals signs and nursing note reviewed  Constitutional:       General: He is awake  Appearance: He is well-developed and normal weight  Interventions: Nasal cannula in place  Cardiovascular:      Rate and Rhythm: Normal rate and regular rhythm  Pulmonary:      Effort: Pulmonary effort is normal       Breath sounds: Decreased air movement present  Chest:      Comments: Left chest tube  Abdominal:      General: Bowel sounds are normal       Palpations: Abdomen is soft  Feet:      Right foot:      Amputation: Right leg is amputated above knee  Skin:     General: Skin is warm  Neurological:      General: No focal deficit present  Mental Status: He is alert and oriented to person, place, and time  Psychiatric:         Mood and Affect: Mood normal          Behavior: Behavior normal  Behavior is cooperative                        Lab Results and Cultures:   CBC with diff:   Lab Results   Component Value Date    WBC 8 24 02/11/2021    HGB 7 3 (L) 02/11/2021    HCT 24 3 (L) 02/11/2021    MCV 95 02/11/2021     02/11/2021    MCH 28 4 02/11/2021    MCHC 30 0 (L) 02/11/2021    RDW 16 1 (H) 02/11/2021    MPV 10 4 02/11/2021    NRBC 0 02/07/2021      BMP/CMP:  Lab Results   Component Value Date    K 3 7 02/11/2021     02/11/2021    CO2 35 (H) 02/11/2021    CO2 23 01/13/2021    BUN 13 02/11/2021    CREATININE 0 40 (L) 02/11/2021    GLUCOSE 179 (H) 01/13/2021    CALCIUM 8 0 (L) 02/11/2021    AST 43 01/23/2021    ALT 52 01/23/2021    ALKPHOS 148 (H) 01/23/2021    EGFR 127 02/11/2021   ,     Coags:   Lab Results   Component Value Date    PTT 64 (H) 01/20/2021    INR 1 44 (H) 01/14/2021   ,          Lipid Panel: No results found for: CHOL  Lab Results   Component Value Date    HDL 33 (L) 03/04/2020     Lab Results   Component Value Date    HDL 33 (L) 03/04/2020     Lab Results   Component Value Date    LDLCALC 72 03/04/2020     Lab Results   Component Value Date    TRIG 210 (H) 01/21/2021       HgbA1c:   Lab Results   Component Value Date    HGBA1C 11 6 (H) 12/09/2020    HGBA1C 11 9 (A) 07/18/2020    HGBA1C 12 9 (A) 06/18/2020       Blood Culture:   Lab Results   Component Value Date    BLOODCX No Growth After 5 Days  01/21/2021   ,   Urinalysis:   Lab Results   Component Value Date    COLORU Nelsy 01/14/2021    CLARITYU Turbid 01/14/2021    SPECGRAV 1 020 01/14/2021    PHUR 5 5 01/14/2021    PHUR 5 5 02/19/2019    LEUKOCYTESUR Small (A) 01/14/2021    NITRITE Negative 01/14/2021    GLUCOSEU 250 (1/4%) (A) 01/14/2021    KETONESU Trace (A) 01/14/2021    BILIRUBINUR Negative 01/14/2021    BLOODU Large (A) 01/14/2021   ,   Urine Culture:   Lab Results   Component Value Date    URINECX No Growth <1000 cfu/mL 01/12/2021   ,   Wound Culure:  No results found for: WOUNDCULT    VTE Pharmacologic Prophylaxis: Enoxaparin (Lovenox)      Thank you for allowing me to participate in the care of [address] [place] [address]  Please don't hesitate to call, text, email, or TigerText with any questions       This text is generated with voice recognition software  There may be translation, syntax,  or grammatical errors  If you have any questions, please contact the dictating provider

## 2021-02-11 NOTE — PROGRESS NOTES
Progress Note - Pulmonary   Dustin Burce 56 y o  male MRN: [id number]  Unit/Bed#: [facility] 713-01 Encounter: [id number]      Assessment:     1  Acute hypoxic respiratory failure- secondary to look moderate pneumothorax superimposed on bilateral ground-glass opacification related to prior COVID infection  2  Primary spontaneous pneumothorax- s/p left IR chest tube  Exudative fluid, 800cc since being placed  No evidence of trauma  Longstanding smoking history and therefore may have had some small bulla  3  COVID-19 pneumonia- initially diagnosed on 12/31/2020 and was asymptomatic  Returned on 01/11 with symptoms of SOB  Was treated per the algorithm  He did require intubation on 01/12/2021 and was extubated on 01/25/2021   4  History of tobacco abuse-smoked for 22 years and quit 7 years ago  Reports smoking 1 pack per day  5  Hepatitis-C  6  Right lower extremity emboli-status post embolectomy and below-the-knee amputation     Plan:     · Continue chest tube to wall suction  · Please flush chest tube with 20 cc of normal saline every 6hrs  · Follow up pleural fluid cultures  · Albuterol 2 puffs q 6 hours p r n  · Lasix daily    Subjective:   Patient seen and examined bedside  No acute events overnight  Feeling fine today    Review of Systems   Constitutional: Positive for fatigue  Negative for chills and fever  HENT: Negative for congestion, postnasal drip and rhinorrhea  Eyes: Negative for itching  Respiratory: Positive for cough  Negative for shortness of breath, wheezing and stridor  Cardiovascular: Negative for chest pain, palpitations and leg swelling  Gastrointestinal: Negative for abdominal distention, abdominal pain, nausea and vomiting  Genitourinary: Negative for dysuria and flank pain  Musculoskeletal: Negative for arthralgias and myalgias  Skin: Negative for color change  Neurological: Negative for dizziness, light-headedness and headaches  Psychiatric/Behavioral: Negative  Objective:     Vitals:    02/11/21 0238 02/11/21 0736 02/11/21 0812 02/11/21 1251   BP:  135/68  116/58   BP Location:  Left arm     Pulse:  85     Resp:  18     Temp:  98 5 °F (36 9 °C)     TempSrc:  Oral     SpO2: 94% 97% 98%    Weight:       Height:               Intake/Output Summary (Last 24 hours) at 2/11/2021 1337  Last data filed at 2/11/2021 1310  Gross per 24 hour   Intake 0 ml   Output 495 ml   Net -495 ml         Physical Exam  Constitutional:       General: He is not in acute distress  Appearance: He is not diaphoretic  HENT:      Head: Normocephalic and atraumatic  Nose: Nose normal       Mouth/Throat:      Pharynx: No oropharyngeal exudate  Eyes:      General: No scleral icterus  Conjunctiva/sclera: Conjunctivae normal       Pupils: Pupils are equal, round, and reactive to light  Neck:      Musculoskeletal: Normal range of motion and neck supple  Thyroid: No thyromegaly  Vascular: No JVD  Trachea: No tracheal deviation  Cardiovascular:      Rate and Rhythm: Normal rate and regular rhythm  Heart sounds: Normal heart sounds  No murmur  No friction rub  No gallop  Pulmonary:      Effort: Pulmonary effort is normal  No respiratory distress  Breath sounds: Normal breath sounds  No stridor  No wheezing or rales  Comments: Scattered rhonchi  Abdominal:      General: Bowel sounds are normal  There is no distension  Palpations: Abdomen is soft  Tenderness: There is no abdominal tenderness  There is no guarding or rebound  Musculoskeletal: Normal range of motion  General: No deformity  Lymphadenopathy:      Cervical: No cervical adenopathy  Skin:     General: Skin is warm  Findings: No erythema or rash  Neurological:      Mental Status: He is alert and oriented to person, place, and time  Cranial Nerves: No cranial nerve deficit  Sensory: No sensory deficit  Labs:  I have personally reviewed pertinent lab results  Results from last 7 days   Lab Units 02/11/21  1004 02/10/21  0642 02/09/21  1031 02/07/21  0544   WBC Thousand/uL 8 24 8 79 8 33 7 83   HEMOGLOBIN g/dL 7 3* 8 6* 8 6* 8 4*   HEMATOCRIT % 24 3* 29 1* 28 1* 28 4*   PLATELETS Thousands/uL 231 209 196 232   NEUTROS PCT %  --   --   --  74   MONOS PCT %  --   --   --  11      Results from last 7 days   Lab Units 02/11/21  0949 02/10/21  0642 02/09/21  1031   POTASSIUM mmol/L 3 7 3 7 3 6   CHLORIDE mmol/L 100 100 101   CO2 mmol/L 35* 35* 36*   BUN mg/dL 13 15 14   CREATININE mg/dL 0 40* 0 46* 0 46*   CALCIUM mg/dL 8 0* 8 5 8 5     Results from last 7 days   Lab Units 02/08/21  0626   MAGNESIUM mg/dL 1 9                  0   Lab Value Date/Time    TROPONINI <0 03 01/11/2021 1020    TROPONINI <0 03 01/06/2021 0557    TROPONINI <0 03 12/31/2020 1955    TROPONINI <0 02 05/21/2020 1517    TROPONINI <0 02 05/21/2020 1228       Microbiology:  Sputum culture mixed resp maria de jesus  Pleural fluid culture pending  MRSA nares negative  Blood culture NTD    Imaging and other studies: I have personally reviewed pertinent reports     and I have personally reviewed pertinent films in PACS  CXR- bilateral opacifications, moderate left pneumothorax    Echo 01/12/2021- LVEF 70%, normal RV, no significant valvular heart disease         Kae Elmore MD  Pulmonary & Critical Care Fellow, [facility]

## 2021-02-11 NOTE — ASSESSMENT & PLAN NOTE
· Secondary to COVID 19  · Extubated 1/25  · O2 requirements worsen because of hydropneumothorax  Now improved and back to 2-3 L

## 2021-02-11 NOTE — CASE MANAGEMENT
HAVEN MALAGON is following  As per Rosa Ladd at [facility], Dr Beth White director -based on current level of functioning and medical instatbiility, will continue to follow to evaluate for admission

## 2021-02-11 NOTE — SPEECH THERAPY NOTE
Speech Language/Pathology    Speech/Language Pathology Progress Note    Patient Name: Renetta Martins  CPXZC'S Date: 2/11/2021     Subjective:  "Oh good I am so hungry"  Pt much more awake,alert today  Required BIPAP last evening but today is on NC  Current Diet:  Npo, since last evening  Objective:  Pt seen for ongoing dx dysphagia tx, trials w/ upgraded material similar to previous diet  Pt trialed w/ puree, soft, mac & cheese, thin, nt by cup/straw  Adequate retrieval w/ mildly prolonged manipulation of the solids & mildly labored transfers of the solids  Pt stated "it isn't easy w/o teeth"  Pt able to eventually clear it w/ some mult swallows & tsps puree  Adequate retrieval of the liquids from cup or straw for both nt & thin  ?control of the thin w/ possible spill prior to the swallow  Noted coughing w/ 4/6 trials of thin by straw  Attempted to complete prep set w/ mult swallows but still had 50% coughing  Assessment:  Pt much more appropriate to resume a diet  Still w/ mild/mod oral, min pharyngeal dysphagia w/ reduced oral control & reduced control of sips of liquids w/ subsequent coughing    No coughing w/ nt      Plan/Recommendations:  Please place pt on level 2 w/ nectar thick  Will follow  Oral care often

## 2021-02-11 NOTE — ASSESSMENT & PLAN NOTE
Status post chest tube placement  Currently connected to suction  Patient's respiratory status has improved  Oxygen requirements back to 2-3 L  Pulmonology following

## 2021-02-11 NOTE — ASSESSMENT & PLAN NOTE
August 21, 2019      Armen Greene MD  [address] [place] [address]           [facility] [id number] [address]  [address] [address]  Phone: [phone]  Fax: [phone]          Patient: Khushboo Garcia   MR Number: [id number]   YOB: 1998   Date of Visit: 8/21/2019       Dear Dr. Armen Greene:    Thank you for referring Khushboo Garcia to me for evaluation. Attached you will find relevant portions of my assessment and plan of care.    If you have questions, please do not hesitate to call me. I look forward to following Khushboo Garcia along with you.    Sincerely,    Melissa Velarde MD    Enclosure  CC:  No Recipients    If you would like to receive this communication electronically, please contact [email or web address] or [phone] to request more information on [employer] Link access.    For providers and/or their staff who would like to refer a patient to [facility], please contact us through our one-stop-shop provider referral line, [facility], at [phone].    If you feel you have received this communication in error or would no longer like to receive these types of communications, please e-mail [email or web address]          Chronic methadone use

## 2021-02-11 NOTE — PROGRESS NOTES
Progress Note Areli Freeman 1960, 56 y o  male MRN: [id number]    Unit/Bed#: [facility] 713-01 Encounter: [id number]    Primary Care Provider: VERONICA Verdugo   Date and time admitted to hospital: 1/13/2021  2:28 PM        * Pneumonia due to COVID-19 virus  Assessment & Plan  · Tested positive for covid 12/31/2020  · S/p completion of covid tx  · S/p completion of IV abx for possible superimposed bacterial pna  · Considered recovered; off isolation    Fever  Assessment & Plan  Patient had a fever of 101 8 previous night and 100 8 after that  None since then  Otherwise asymptomatic  No evidence of pneumonia on x-ray  Continue monitor closely for now off antibiotics  If spikes high-grade fever again, will need full infectious workup including blood cultures  Left Hydropneumothorax  Assessment & Plan  Status post chest tube placement  Currently connected to suction  Patient's respiratory status has improved  Oxygen requirements back to 2-3 L  Pulmonology following  Ischemia of right lower extremity with suspected rhabdomyolysis  Assessment & Plan  · Suspected embolic from aortic thrombus  · 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  · Continue therapeutic Lovenox   · Currently stable from a vascular surgery standpoint    Acute respiratory failure with hypoxia ([address] )  Assessment & Plan  · Secondary to COVID 19  · Extubated 1/25  · O2 requirements worsen because of hydropneumothorax  Now improved and back to 2-3 L  Right femoral vein DVT (HCC)  Assessment & Plan  Continue Lovenox  Oropharyngeal dysphagia  Assessment & Plan  · Extubated 1/25 and required NGT for nutrition   · Speech therapy following   · S/p barium swallow  · S/p swallow re-eval upgraded to level 2  NGT has since been removed  · Continue robinul  · S/p ENT consult, no vocal cord motion impairment  Aortic thrombus [facility])  Assessment & Plan  · With embolic event that compromised right femoral artery  S/p urgent R margotine AKA 1/14  S/p R AKA formalization    · Continue therapeutic lovenox  · F/u with vascular surgery as outpatient    Type 2 diabetes mellitus, with long-term current use of insulin [facility])  Assessment & Plan  Lab Results   Component Value Date    HGBA1C 11 6 (H) 2020       Recent Labs     02/10/21  1550 02/10/21  2116 21  0638 21  1046   POCGLU 103 106 116 105       Blood Sugar Average: Last 72 hrs:  (P) 318 [id number]     · Continue Lantus 10 units with 3 units Humalog t i d  With meals  Continue sliding scale  Methadone maintenance therapy patient [facility])  Assessment & Plan  Chronic methadone use    HTN (hypertension)  Assessment & Plan  · Stable  · Continue lisinopril, lasix        VTE Pharmacologic Prophylaxis:   Pharmacologic: Enoxaparin (Lovenox)  Mechanical VTE Prophylaxis in Place: Yes    Patient Centered Rounds: I have performed bedside rounds with nursing staff today  Discussions with Specialists or Other Care Team Provider: pulm    Education and Discussions with Family / Patient: pt  Tried calling Edmundo Mirza unable to reach him  Left message  Time Spent for Care: 30 minutes  More than 50% of total time spent on counseling and coordination of care as described above  Current Length of Stay: 29 day(s)    Current Patient Status: Inpatient   Certification Statement: The patient will continue to require additional inpatient hospital stay due to above    Discharge Plan:     Code Status: Level 1 - Full Code      Subjective:   Pt seen and examined by me this morning  Pt complain of phantom limb pain  No other complaints  Breathing much improved compared to yesterday  Objective:     Vitals:   Temp (24hrs), Av 8 °F (37 1 °C), Min:98 5 °F (36 9 °C), Max:99 °F (37 2 °C)    Temp:  [98 5 °F (36 9 °C)-99 °F (37 2 °C)] 98 5 °F (36 9 °C)  HR:  [] 85  Resp:  [18-19] 18  BP: (116-135)/(58-79) 116/58  SpO2:  [88 %-100 %] 98 %  Body mass index is 26 22 kg/m²       Input and Output Summary (last 24 hours): Intake/Output Summary (Last 24 hours) at 2/11/2021 1258  Last data filed at 2/11/2021 0800  Gross per 24 hour   Intake 0 ml   Output 120 ml   Net -120 ml       Physical Exam:     Physical Exam    Constitutional: Pt appears comfortable  Not in any acute distress  Cardiovascular: Normal rate, regular rhythm, normal heart sounds  No murmur heard  Pulmonary/Chest: Effort normal, air entry b/l equal  No respiratory distress  Pt has no wheezes or crackles  Abdominal: Soft  Non-distended, Non-tender  Bowel sounds are normal    Neurological: awake, alert  Moving all extremities spontaneously  Psychiatric: normal mood and affect  Additional Data:     Labs:    Results from last 7 days   Lab Units 02/11/21  1004  02/07/21  0544   WBC Thousand/uL 8 24   < > 7 83   HEMOGLOBIN g/dL 7 3*   < > 8 4*   HEMATOCRIT % 24 3*   < > 28 4*   PLATELETS Thousands/uL 231   < > 232   NEUTROS PCT %  --   --  74   LYMPHS PCT %  --   --  13*   MONOS PCT %  --   --  11   EOS PCT %  --   --  1    < > = values in this interval not displayed  Results from last 7 days   Lab Units 02/11/21  0949   SODIUM mmol/L 138   POTASSIUM mmol/L 3 7   CHLORIDE mmol/L 100   CO2 mmol/L 35*   BUN mg/dL 13   CREATININE mg/dL 0 40*   ANION GAP mmol/L 3*   CALCIUM mg/dL 8 0*   GLUCOSE RANDOM mg/dL 96         Results from last 7 days   Lab Units 02/11/21  1046 02/11/21  0638 02/10/21  2116 02/10/21  1550 02/10/21  1359 02/10/21  1104 02/10/21  0702 02/09/21  2114 02/09/21  1612 02/09/21  1114 02/09/21  0659 02/08/21  2131   POC GLUCOSE mg/dl 105 116 106 103 110 121 133 117 125 135 188* 162*         Results from last 7 days   Lab Units 02/11/21  0536 02/10/21  0642   PROCALCITONIN ng/ml 0 27* 0 31*           * I Have Reviewed All Lab Data Listed Above  * Additional Pertinent Lab Tests Reviewed:  All Labs For Current Hospital Admission Reviewed    Imaging:    Imaging Reports Reviewed Today Include:   Imaging Personally Reviewed by Myself Includes:     Recent Cultures (last 7 days):     Results from last 7 days   Lab Units 02/10/21  1253   GRAM STAIN RESULT  1+ Polys  No organisms seen   BODY FLUID CULTURE, STERILE  No growth       Last 24 Hours Medication List:   Current Facility-Administered Medications   Medication Dose Route Frequency Provider Last Rate    acetaminophen  650 mg Oral Q6H PRN Shannan A Sedora, CRNP      albuterol  2 puff Inhalation Q4H PRN Yesenia Merchant, DO      bisacodyl  10 mg Rectal Daily PRN Keke Ramirez, CRJASPER      cholecalciferol  2,000 Units Oral Daily Shannan A Sedora, CRNP      Diclofenac Sodium  4 g Topical 4x Daily Shannan A Sedora, CRNP      enoxaparin  1 mg/kg Subcutaneous Q12H [facility] Serenity Mohamud MD      furosemide  40 mg Oral Daily Yesenia Merchant, DO      gabapentin  400 mg Oral TID Shannan A Sedora, CRNP      glycopyrrolate  1 mg Oral TID Shannan A Sedora, CRNP      insulin glargine  10 Units Subcutaneous HS [facility], MD      insulin lispro  1-5 Units Subcutaneous TID AC [facility], MD      insulin lispro  3 Units Subcutaneous TID With Meals Maggy Henriquez, MD      Lidocaine Viscous HCl  15 mL Swish & Spit 4x Daily PRN Shannan A Sedora, VERONICA      lisinopril  10 mg Oral Daily Shannan A Sedora, CRNP      menthol-methyl salicylate   Apply externally 4x Daily PRN Keke Ramirez, CRNP      methadone  70 mg Oral Daily Yesenia Merchant, DO      SAMREEN ANTIFUNGAL  1 application Topical TID Hetul Mccall, DO      multivitamin-minerals  1 tablet Oral Daily Shannan A Sedora, CRNP      omeprazole (PRILOSEC) suspension 2 mg/mL  20 mg Oral Early Morning Shannan A Sedora, CRNP      oxyCODONE  2 5 mg Oral Q6H PRN Serenity Mohamud MD      phenol  1 spray Mouth/Throat Q2H PRN Shannan A Sedora, CRNP      polyethylene glycol  17 g Oral Daily Shannan A Sedora, CRNP      senna  1 tablet Oral BID Shannan A Sedora, CRNP      sodium chloride  1 spray Each Nare Q1H PRN Luz Maria Rueda, DO          Today, Patient Was Seen By: Garfield Mason MD    ** Please Note: Dictation voice to text software may have been used in the creation of this document   **

## 2021-02-11 NOTE — PLAN OF CARE
Problem: Nutrition/Hydration-ADULT  Goal: Nutrient/Hydration intake appropriate for improving, restoring or maintaining nutritional needs  Description: Monitor and assess patient's nutrition/hydration status for malnutrition  Collaborate with interdisciplinary team and initiate plan and interventions as ordered  Monitor patient's weight and dietary intake as ordered or per policy  Utilize nutrition screening tool and intervene as necessary  Determine patient's food preferences and provide high-protein, high-caloric foods as appropriate       INTERVENTIONS:  - Monitor oral intake, urinary output, labs, and treatment plans  - Assess nutrition and hydration status and recommend course of action  - Evaluate amount of meals eaten  - Assist patient with eating if necessary   - Allow adequate time for meals  - Recommend/ encourage appropriate diets, oral nutritional supplements, and vitamin/mineral supplements  - Order, calculate, and assess calorie counts as needed  - Assess need for intravenous fluids  - Provide specific nutrition/hydration education as appropriate  - Include patient/family/caregiver in decisions related to nutrition  Outcome: Progressing

## 2021-02-11 NOTE — ASSESSMENT & PLAN NOTE
Lab Results   Component Value Date    HGBA1C 11 6 (H) 12/09/2020       Recent Labs     02/10/21  1550 02/10/21  2116 02/11/21  0638 02/11/21  1046   POCGLU 103 106 116 105       Blood Sugar Average: Last 72 hrs:  (P) 999 [id number]     · Continue Lantus 10 units with 3 units Humalog t i d  With meals  Continue sliding scale

## 2021-02-12 ENCOUNTER — APPOINTMENT (INPATIENT)
Dept: RADIOLOGY | Facility: HOSPITAL | Age: 61
DRG: 853 | End: 2021-02-12
Payer: COMMERCIAL

## 2021-02-12 ENCOUNTER — APPOINTMENT (INPATIENT)
Dept: RADIOLOGY | Facility: HOSPITAL | Age: 61
DRG: 853 | End: 2021-02-12
Attending: INTERNAL MEDICINE
Payer: COMMERCIAL

## 2021-02-12 LAB
ERYTHROCYTE [DISTWIDTH] IN BLOOD BY AUTOMATED COUNT: 16 % (ref 11.6–15.1)
GLUCOSE SERPL-MCNC: 119 MG/DL (ref 65–140)
GLUCOSE SERPL-MCNC: 161 MG/DL (ref 65–140)
GLUCOSE SERPL-MCNC: 175 MG/DL (ref 65–140)
GLUCOSE SERPL-MCNC: 192 MG/DL (ref 65–140)
GLUCOSE SERPL-MCNC: 223 MG/DL (ref 65–140)
HCT VFR BLD AUTO: 27.1 % (ref 36.5–49.3)
HGB BLD-MCNC: 8 G/DL (ref 12–17)
MCH RBC QN AUTO: 28.1 PG (ref 26.8–34.3)
MCHC RBC AUTO-ENTMCNC: 29.5 G/DL (ref 31.4–37.4)
MCV RBC AUTO: 95 FL (ref 82–98)
PLATELET # BLD AUTO: 215 THOUSANDS/UL (ref 149–390)
PMV BLD AUTO: 10 FL (ref 8.9–12.7)
RBC # BLD AUTO: 2.85 MILLION/UL (ref 3.88–5.62)
WBC # BLD AUTO: 7.24 THOUSAND/UL (ref 4.31–10.16)

## 2021-02-12 PROCEDURE — 82948 REAGENT STRIP/BLOOD GLUCOSE: CPT

## 2021-02-12 PROCEDURE — 97530 THERAPEUTIC ACTIVITIES: CPT

## 2021-02-12 PROCEDURE — 99233 SBSQ HOSP IP/OBS HIGH 50: CPT | Performed by: THORACIC SURGERY (CARDIOTHORACIC VASCULAR SURGERY)

## 2021-02-12 PROCEDURE — NC001 PR NO CHARGE: Performed by: THORACIC SURGERY (CARDIOTHORACIC VASCULAR SURGERY)

## 2021-02-12 PROCEDURE — 97164 PT RE-EVAL EST PLAN CARE: CPT

## 2021-02-12 PROCEDURE — 99233 SBSQ HOSP IP/OBS HIGH 50: CPT | Performed by: INTERNAL MEDICINE

## 2021-02-12 PROCEDURE — 99232 SBSQ HOSP IP/OBS MODERATE 35: CPT | Performed by: INTERNAL MEDICINE

## 2021-02-12 PROCEDURE — 97535 SELF CARE MNGMENT TRAINING: CPT

## 2021-02-12 PROCEDURE — 32561 LYSE CHEST FIBRIN INIT DAY: CPT | Performed by: PHYSICIAN ASSISTANT

## 2021-02-12 PROCEDURE — 71045 X-RAY EXAM CHEST 1 VIEW: CPT

## 2021-02-12 PROCEDURE — 85027 COMPLETE CBC AUTOMATED: CPT | Performed by: INTERNAL MEDICINE

## 2021-02-12 RX ADMIN — GABAPENTIN 400 MG: 400 CAPSULE ORAL at 23:49

## 2021-02-12 RX ADMIN — DICLOFENAC SODIUM 4 G: 10 GEL TOPICAL at 09:26

## 2021-02-12 RX ADMIN — MICONAZOLE NITRATE 1 APPLICATION: 20 CREAM TOPICAL at 23:49

## 2021-02-12 RX ADMIN — INSULIN LISPRO 1 UNITS: 100 INJECTION, SOLUTION INTRAVENOUS; SUBCUTANEOUS at 13:11

## 2021-02-12 RX ADMIN — METHADONE HYDROCHLORIDE 70 MG: 10 TABLET ORAL at 18:05

## 2021-02-12 RX ADMIN — POLYETHYLENE GLYCOL 3350 17 G: 17 POWDER, FOR SOLUTION ORAL at 09:25

## 2021-02-12 RX ADMIN — Medication 2000 UNITS: at 09:24

## 2021-02-12 RX ADMIN — ENOXAPARIN SODIUM 70 MG: 80 INJECTION SUBCUTANEOUS at 23:48

## 2021-02-12 RX ADMIN — MICONAZOLE NITRATE 1 APPLICATION: 20 CREAM TOPICAL at 18:01

## 2021-02-12 RX ADMIN — GABAPENTIN 400 MG: 400 CAPSULE ORAL at 17:57

## 2021-02-12 RX ADMIN — GLYCOPYRROLATE 1 MG: 1 TABLET ORAL at 09:26

## 2021-02-12 RX ADMIN — DICLOFENAC SODIUM 4 G: 10 GEL TOPICAL at 23:50

## 2021-02-12 RX ADMIN — FUROSEMIDE 40 MG: 40 TABLET ORAL at 09:32

## 2021-02-12 RX ADMIN — Medication 1 TABLET: at 09:25

## 2021-02-12 RX ADMIN — ENOXAPARIN SODIUM 70 MG: 80 INJECTION SUBCUTANEOUS at 09:26

## 2021-02-12 RX ADMIN — INSULIN GLARGINE 10 UNITS: 100 INJECTION, SOLUTION SUBCUTANEOUS at 23:49

## 2021-02-12 RX ADMIN — Medication 20 MG: at 06:17

## 2021-02-12 RX ADMIN — MICONAZOLE NITRATE 1 APPLICATION: 20 CREAM TOPICAL at 09:27

## 2021-02-12 RX ADMIN — DICLOFENAC SODIUM 4 G: 10 GEL TOPICAL at 13:12

## 2021-02-12 RX ADMIN — SENNOSIDES 8.6 MG: 8.6 TABLET, FILM COATED ORAL at 09:25

## 2021-02-12 RX ADMIN — LISINOPRIL 10 MG: 10 TABLET ORAL at 09:31

## 2021-02-12 RX ADMIN — GLYCOPYRROLATE 1 MG: 1 TABLET ORAL at 17:59

## 2021-02-12 RX ADMIN — SENNOSIDES 8.6 MG: 8.6 TABLET, FILM COATED ORAL at 17:57

## 2021-02-12 RX ADMIN — GLYCOPYRROLATE 1 MG: 1 TABLET ORAL at 23:50

## 2021-02-12 RX ADMIN — INSULIN LISPRO 1 UNITS: 100 INJECTION, SOLUTION INTRAVENOUS; SUBCUTANEOUS at 09:41

## 2021-02-12 NOTE — PROGRESS NOTES
Progress Note - Pulmonary   Gavin Russell 56 y o  male MRN: [id number]  Unit/Bed#: [facility] 713-01 Encounter: [id number]      Assessment:     Acute hypoxic respiratory failure- secondary to look moderate pneumothorax superimposed on bilateral ground-glass opacification related to prior COVID infection  - s/p left 10F IR chest tube on 2/11 - continue suction -20   -  Exudative fluid  - overall output overnight 125cc   - discussed with RN to flush with normal saline 20cc Q6H which has not been documented since CT placement   - CXR today still with moderate PTX   - will discuss with thoracic surgery for possible trapped lung and any utility in decortication   - on 2lpm saturating 93%     COVID-19 pneumonia  - initially diagnosed on 12/31/2020 and was asymptomatic  Returned on 01/11 with symptoms of SOB requiring intubation on 01/12/2021 and was extubated on 01/25/2021  History of tobacco abuse    Hepatitis-C    Right lower extremity emboli-status post embolectomy and below-the-knee amputation   - on therapeutic Lovenox       Subjective:   Patient seen/examined this AM   As per RN, he was AAO x2 for her, however, he was only AAO x1 for me and very lethargic  He was not able to communicate and had not eaten this morning  Review of Systems   Unable to perform ROS: Mental status change       Objective:     Vitals:    02/11/21 2209 02/11/21 2336 02/12/21 0600 02/12/21 0718   BP: 133/66      BP Location:       Pulse: (!) 107   92   Resp: 15   18   Temp:    99 8 °F (37 7 °C)   TempSrc:       SpO2: (!) 87% 93%  94%   Weight:   21 kg (46 lb 4 8 oz)    Height:               Intake/Output Summary (Last 24 hours) at 2/12/2021 0913  Last data filed at 2/12/2021 0047  Gross per 24 hour   Intake --   Output 725 ml   Net -725 ml         Physical Exam  Vitals signs reviewed  Constitutional:       Comments: Lethargic, ill-appearing    HENT:      Head: Normocephalic        Nose: Nose normal       Mouth/Throat:      Mouth: Mucous membranes are moist    Cardiovascular:      Rate and Rhythm: Normal rate and regular rhythm  Pulmonary:      Comments: Diminished breath sounds b/l  Abdominal:      General: Abdomen is flat  Palpations: Abdomen is soft  Musculoskeletal:      Comments: RLE AKA    Skin:     General: Skin is warm  Neurological:      Mental Status: He is disoriented  Labs: I have personally reviewed pertinent lab results  Results from last 7 days   Lab Units 02/11/21  1004 02/10/21  0642 02/09/21  1031 02/07/21  0544   WBC Thousand/uL 8 24 8 79 8 33 7 83   HEMOGLOBIN g/dL 7 3* 8 6* 8 6* 8 4*   HEMATOCRIT % 24 3* 29 1* 28 1* 28 4*   PLATELETS Thousands/uL 231 209 196 232   NEUTROS PCT %  --   --   --  74   MONOS PCT %  --   --   --  11      Results from last 7 days   Lab Units 02/11/21  0949 02/10/21  0642 02/09/21  1031   POTASSIUM mmol/L 3 7 3 7 3 6   CHLORIDE mmol/L 100 100 101   CO2 mmol/L 35* 35* 36*   BUN mg/dL 13 15 14   CREATININE mg/dL 0 40* 0 46* 0 46*   CALCIUM mg/dL 8 0* 8 5 8 5     Results from last 7 days   Lab Units 02/08/21  0626   MAGNESIUM mg/dL 1 9                  0   Lab Value Date/Time    TROPONINI <0 03 01/11/2021 1020    TROPONINI <0 03 01/06/2021 0557    TROPONINI <0 03 12/31/2020 1955    TROPONINI <0 02 05/21/2020 1517    TROPONINI <0 02 05/21/2020 1228       Microbiology:  Sputum with mixed maria de jesus   Pleural fluid negative to date     Imaging and other studies: I have personally reviewed pertinent reports     and I have personally reviewed pertinent films in PACS        Fredy Daugherty MD   Pulmonary & Critical Care Fellow  [facility]

## 2021-02-12 NOTE — OCCUPATIONAL THERAPY NOTE
Occupational Therapy Treatment Note     02/12/21 1411   OT Last Visit   OT Visit Date 02/12/21   Note Type   Note Type Treatment   Restrictions/Precautions   Weight Bearing Precautions Per Order Yes   RUE Weight Bearing Per Order NWB   RLE Weight Bearing Per Order NWB  (R AKA)   Braces or Orthoses Other (Comment)  (L Multipodis boot )   General   Response to Previous Treatment Patient with no complaints from previous session   Lifestyle   Autonomy I ADLS/IADLS, transfers and functional mobility PTA   Reciprocal Relationships Pt lives w/ his siblings and nephews   Service to Others Pt works full time   Intrinsic Gratification Unable to report @ this time   Pain Assessment   Pain Assessment Tool Pain Assessment not indicated - pt denies pain   Pain Score No Pain   ADL   Where Assessed Edge of bed   Grooming Assistance 3  Moderate Assistance   Grooming Deficit Wash/dry face;Shaving   Bed Mobility   Supine to Sit 3  Moderate assistance   Additional items Assist x 2   Sit to Supine 2  Maximal assistance   Additional items Assist x 2   Transfers   Sit to Stand 3  Moderate assistance   Additional items Assist x 2   Stand to Sit 3  Moderate assistance   Additional items Assist x 2   Cognition   Overall Cognitive Status WFL   Arousal/Participation Cooperative   Attention Within functional limits   Orientation Level Oriented X4   Memory Within functional limits   Following Commands Follows one step commands with increased time or repetition   Activity Tolerance   Activity Tolerance Patient limited by fatigue   Assessment   Assessment Pt participated in occupational therapy with focus on activity tolerance,  bed mob, unsupported sitting balance and tolerance for pt engagement in functional self-care task/grooming/shaving and washing face  Pt cleared by RN/[place] for pt participation in therapy  Pt received HOB raised/supine pt sitting upright and agreeable to therapy following pt Identifiers confirmed   Pt required assist for bed mob and unsupported sitting balance at edge of pt bed 2* pt decreased overall strength  Pt required verbal cues to hold up head and trunk throughout session 2* pt decreased strength  Pt cooperative and motivated with all therapy requests  Pt will require post acute rehab services to continue to address  pt deficits with decreased strength, coordination and balance which currently impair pt ADL and functional mob  Pt bed alarm active post session all needs within reach      Plan   Treatment Interventions ADL retraining   Goal Expiration Date 02/23/21   OT Treatment Day 6   OT Frequency 2-3x/wk   Recommendation   OT Discharge Recommendation Post-Acute Rehabilitation Services   AM-PAC Daily Activity Inpatient   Lower Body Dressing 1   Bathing 2   Toileting 1   Upper Body Dressing 1   Grooming 2   Eating 2   Daily Activity Raw Score 9   Turning Head Towards Sound 4   Follow Simple Instructions 3   Low Function Daily Activity Raw Score 16   Low Function Daily Activity Standardized Score 27 65   AM-PAC Applied Cognition Inpatient   Following a Speech/Presentation 2   Understanding Ordinary Conversation 3   Taking Medications 3   Remembering Where Things Are Placed or Put Away 3   Remembering List of 4-5 Errands 2   Taking Care of Complicated Tasks 2   Applied Cognition Raw Score 15   Applied Cognition Standardized Score 33 54   Barthel Index   Feeding 0   Bathing 0   Grooming Score 0   Dressing Score 0   Bladder Score 0   Bowels Score 0   Toilet Use Score 5   Transfers (Bed/Chair) Score 5   Mobility (Level Surface) Score 0   Stairs Score 0   Barthel Index Score 10   Modified [place] Scale   Modified [place] Scale 4       Corina BROOKS/MATY

## 2021-02-12 NOTE — PROGRESS NOTES
Progress Note Gerald Bowens 1960, 56 y o  male MRN: [id number]    Unit/Bed#: [facility] 713-01 Encounter: [id number]    Primary Care Provider: VERONICA Cunningham   Date and time admitted to hospital: 1/13/2021  2:28 PM        * Pneumonia due to COVID-19 virus  Assessment & Plan  · Tested positive for covid 12/31/2020  · S/p completion of covid tx  · S/p completion of IV abx for possible superimposed bacterial pna  · Considered recovered; off isolation    Fever  Assessment & Plan  Patient had a fever of 101 8 previous night and 100 8 after that  None since then  Otherwise asymptomatic  No evidence of pneumonia on x-ray  Continue monitor closely for now off antibiotics  If spikes high-grade fever again, will need full infectious workup including blood cultures  Left Hydropneumothorax  Assessment & Plan  Status post chest tube placement  Currently connected to suction  Patient's respiratory status has improved  Oxygen requirements back to 2-3 L  Pulmonology following  Ischemia of right lower extremity with suspected rhabdomyolysis  Assessment & Plan  · Suspected embolic from aortic thrombus  · 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  · Continue therapeutic Lovenox   · Currently stable from a vascular surgery standpoint    Acute respiratory failure with hypoxia ([address] )  Assessment & Plan  · Secondary to COVID 19  · Extubated 1/25  · O2 requirements worsen because of hydropneumothorax  Now improved and back to 2-3 L  Right femoral vein DVT (HCC)  Assessment & Plan  Continue Lovenox  Edema of right upper extremity  Assessment & Plan  · +2 pitting edema also with significant weakness/paresis   · S/p Venous duplex on 01/26 negative for acute or chronic DVT   · Resumed back on lasix with some improvement    Oropharyngeal dysphagia  Assessment & Plan  · Extubated 1/25 and required NGT for nutrition   · Speech therapy following   · S/p barium swallow  · Continue modified diet    · Continue robinul  · S/p ENT consult, no vocal cord motion impairment  Myopathy  Assessment & Plan  · Profound weakness especially due to critical illness and steroid use  · Plan for rehab at discharge     Aortic thrombus [facility])  Assessment & Plan  · With embolic event that compromised right femoral artery  S/p urgent R guillotine AKA   S/p R AKA formalization    · Continue therapeutic lovenox  · F/u with vascular surgery as outpatient    Type 2 diabetes mellitus, with long-term current use of insulin [facility])  Assessment & Plan  Lab Results   Component Value Date    HGBA1C 11 6 (H) 2020       Recent Labs     21  2208 21  0558 21  0932 21  1111   POCGLU 166* 223* 175* 161*       Blood Sugar Average: Last 72 hrs:  (P) 137 [id number]     · Continue Lantus 10 units with 3 units Humalog t i d  With meals  Continue sliding scale  Methadone maintenance therapy patient [facility])  Assessment & Plan  Chronic methadone use    HTN (hypertension)  Assessment & Plan  · Stable  · Continue lisinopril, lasix        VTE Pharmacologic Prophylaxis:   Pharmacologic: Enoxaparin (Lovenox)  Mechanical VTE Prophylaxis in Place: Yes    Patient Centered Rounds: I have performed bedside rounds with nursing staff today  Discussions with Specialists or Other Care Team Provider:     Education and Discussions with Family / Patient: called and updated brother    Time Spent for Care: 30 minutes  More than 50% of total time spent on counseling and coordination of care as described above  Current Length of Stay: 30 day(s)    Current Patient Status: Inpatient   Certification Statement: The patient will continue to require additional inpatient hospital stay due to above    Discharge Plan:     Code Status: Level 1 - Full Code      Subjective:   Pt seen and examined by me this morning  Pt denied any specific complaint except for pain in his lower extremities  Awake alert      Objective:     Vitals:   Temp (24hrs), Av 8 °F (37 7 °C), Min:99 8 °F (37 7 °C), Max:99 8 °F (37 7 °C)    Temp:  [99 8 °F (37 7 °C)] 99 8 °F (37 7 °C)  HR:  [] 70  Resp:  [15-18] 18  BP: (127-133)/(65-66) 127/65  SpO2:  [87 %-94 %] 94 %  Body mass index is 7 47 kg/m²  Input and Output Summary (last 24 hours): Intake/Output Summary (Last 24 hours) at 2/12/2021 1518  Last data filed at 2/12/2021 1400  Gross per 24 hour   Intake 240 ml   Output 725 ml   Net -485 ml       Physical Exam:     Physical Exam    Constitutional: Pt appears comfortable  Not in any acute distress  Cardiovascular: Normal rate, regular rhythm, normal heart sounds  No murmur heard  Pulmonary/Chest: Effort normal, air entry b/l equal  No respiratory distress  Pt has no wheezes or crackles  Abdominal: Soft  Non-distended, Non-tender  Bowel sounds are normal    Neurological: awake, alert  Psychiatric: normal mood and affect  Additional Data:     Labs:    Results from last 7 days   Lab Units 02/12/21  0935  02/07/21  0544   WBC Thousand/uL 7 24   < > 7 83   HEMOGLOBIN g/dL 8 0*   < > 8 4*   HEMATOCRIT % 27 1*   < > 28 4*   PLATELETS Thousands/uL 215   < > 232   NEUTROS PCT %  --   --  74   LYMPHS PCT %  --   --  13*   MONOS PCT %  --   --  11   EOS PCT %  --   --  1    < > = values in this interval not displayed       Results from last 7 days   Lab Units 02/11/21  0949   SODIUM mmol/L 138   POTASSIUM mmol/L 3 7   CHLORIDE mmol/L 100   CO2 mmol/L 35*   BUN mg/dL 13   CREATININE mg/dL 0 40*   ANION GAP mmol/L 3*   CALCIUM mg/dL 8 0*   GLUCOSE RANDOM mg/dL 96         Results from last 7 days   Lab Units 02/12/21  1111 02/12/21  0932 02/12/21  0558 02/11/21  2208 02/11/21  2111 02/11/21  1615 02/11/21  1046 02/11/21  0638 02/10/21  2116 02/10/21  1550 02/10/21  1359 02/10/21  1104   POC GLUCOSE mg/dl 161* 175* 223* 166* 65 183* 105 116 106 103 110 121         Results from last 7 days   Lab Units 02/11/21  0536 02/10/21  0642   PROCALCITONIN ng/ml 0 27* 0 31*           * I Have Reviewed All Lab Data Listed Above  * Additional Pertinent Lab Tests Reviewed:  Jaime [facility] Admission Reviewed    Imaging:    Imaging Reports Reviewed Today Include:   Imaging Personally Reviewed by Myself Includes:      Recent Cultures (last 7 days):     Results from last 7 days   Lab Units 02/10/21  1253   GRAM STAIN RESULT  1+ Polys  No organisms seen   BODY FLUID CULTURE, STERILE  Few Colonies of Candida albicans*       Last 24 Hours Medication List:   Current Facility-Administered Medications   Medication Dose Route Frequency Provider Last Rate    acetaminophen  650 mg Oral Q6H PRN Shannan A Sedora, VERONICA      albuterol  2 puff Inhalation Q4H PRN Miley Castillo, DO      bisacodyl  10 mg Rectal Daily PRN VERONICA Whitman      cholecalciferol  2,000 Units Oral Daily Shannan A Sandeep, VERONICA      Diclofenac Sodium  4 g Topical 4x Daily Shannan A [place]ora, VERONICA      enoxaparin  1 mg/kg Subcutaneous Q12H [address] Vicente Kelly MD      furosemide  40 mg Oral Daily Miley Castillo, DO      gabapentin  400 mg Oral TID Shannan A VERONICA Hopkins      glycopyrrolate  1 mg Oral TID Shannan A Sandeep, VERONICA      insulin glargine  10 Units Subcutaneous HS Madai Weller MD      insulin lispro  1-5 Units Subcutaneous TID AC Madai Weller MD      insulin lispro  3 Units Subcutaneous TID With Meals Madai Weller MD      Lidocaine Viscous HCl  15 mL Swish & Spit 4x Daily PRN Shannan A Sandeep, VERONICA      lisinopril  10 mg Oral Daily Shannan A Sandeep, VERONICA      menthol-methyl salicylate   Apply externally 4x Daily PRN VERONICA Whitman      methadone  70 mg Oral Daily Wilidalia Cocker,       SAMREEN ANTIFUNGAL  1 application Topical TID Hetul Mccall, DO      multivitamin-minerals  1 tablet Oral Daily Shannan A VERONICA Hopkins      omeprazole (PRILOSEC) suspension 2 mg/mL  20 mg Oral Early Morning Shannan A VERONICA Hopkins      oxyCODONE  2 5 mg Oral Q6H PRN Vicente Kelly MD      phenol  1 spray Mouth/Throat Q2H PRN VERONICA Kennedy      polyethylene glycol  17 g Oral Daily VERONICA Kennedy      senna  1 tablet Oral BID VERONICA Kennedy      sodium chloride  1 spray Each Nare Q1H PRN Juan Luis Mane DO          Today, Patient Was Seen By: Yeny Starkey MD    ** Please Note: Dictation voice to text software may have been used in the creation of this document   **

## 2021-02-12 NOTE — UTILIZATION REVIEW
Continued Stay Review    Date: 2-12-2                    Current Patient Class: inpatient  Current Level of Care:med surg    HPI:59 y o  male initially admitted on-13 for covid 19 pneumonia, right AKA on 1-14 from suspected embolic aortic thrombus  Intubated  1-12 to 1-25  All covid  Treatment completed  Assessment/Plan:     2-11  Patient with left hydropneumothorax and increasing oxygen requirements  A left chest tube was placed on 2-10  Chest  X ray shows pneumothorax with possible trapped lung  Plan to continue wall suction  Output 24 hr = 125 serosanguinous  Follow up cultures  Weaned from 10 L oxygen mid flow nasal cannula to 3L O2 nc      2-12     Chest x ray shows increase in size of left  Pneumothorax  Continue chest tube to suction  Output is serosanguinous Temp 99 8  Oxygen requirement requirement 2L O2nc  Continue to wean to room air  Patient will need acute rehab at discharge  Referrals completed         Pertinent Labs/Diagnostic Results:     CHEST    2-12-21   INDICATION:   follow up/hydropneumothorax  Patient had confirmed COVID-19  Positive on 12/31/2020      Slight increase in moderate left pneumothorax  Pigtail catheter in the inferior left pleural space  Redemonstration of moderate bilateral groundglass opacity        Results from last 7 days   Lab Units 02/10/21  1553   SARS-COV-2  Negative     Results from last 7 days   Lab Units 02/12/21  0935 02/11/21  1004 02/10/21  0642 02/09/21  1031 02/07/21  0544   WBC Thousand/uL 7 24 8 24 8 79 8 33 7 83   HEMOGLOBIN g/dL 8 0* 7 3* 8 6* 8 6* 8 4*   HEMATOCRIT % 27 1* 24 3* 29 1* 28 1* 28 4*   PLATELETS Thousands/uL 215 231 209 196 232   NEUTROS ABS Thousands/µL  --   --   --   --  5 72         Results from last 7 days   Lab Units 02/11/21  0949 02/10/21  0642 02/09/21  1031 02/08/21  0626 02/07/21  0544   SODIUM mmol/L 138 137 139 138 135*   POTASSIUM mmol/L 3 7 3 7 3 6 3 5 4 2   CHLORIDE mmol/L 100 100 101 100 99*   CO2 mmol/L 35* 35* 36* 34* 33*   ANION GAP mmol/L 3* 2* 2* 4 3*   BUN mg/dL 13 15 14 11 11   CREATININE mg/dL 0 40* 0 46* 0 46* 0 63 0 37*   EGFR ml/min/1 73sq m 127 120 120 106 131   CALCIUM mg/dL 8 0* 8 5 8 5 8 2* 8 1*   MAGNESIUM mg/dL  --   --   --  1 9  --          Results from last 7 days   Lab Units 02/12/21  1111 02/12/21  0932 02/12/21  0558 02/11/21  2208 02/11/21  2111 02/11/21  1615 02/11/21  1046 02/11/21  0638 02/10/21  2116 02/10/21  1550 02/10/21  1359 02/10/21  1104   POC GLUCOSE mg/dl 161* 175* 223* 166* 65 183* 105 116 106 103 110 121     Results from last 7 days   Lab Units 02/11/21  0949 02/10/21  0642 02/09/21  1031 02/08/21  0626 02/07/21  0544   GLUCOSE RANDOM mg/dL 96 99 116 134 99             BETA-HYDROXYBUTYRATE   Date Value Ref Range Status   01/11/2021 6 2 (H) <0 6 mmol/L Final      Results from last 7 days   Lab Units 02/10/21  1506   PH ART  7 477*   PCO2 ART mm Hg 52 6*   PO2 ART mm Hg 23 4*   HCO3 ART mmol/L 38 0*   BASE EXC ART mmol/L 13 0   O2 CONTENT ART mL/dL 4 6*   O2 HGB, ARTERIAL % 37 6*   ABG SOURCE  Brachial, Left       Results from last 7 days   Lab Units 02/11/21  0536 02/10/21  0642   PROCALCITONIN ng/ml 0 27* 0 31*           Results from last 7 days   Lab Units 02/10/21  1553   INFLUENZA A PCR  Negative   INFLUENZA B PCR  Negative   RSV PCR  Negative       Results from last 7 days   Lab Units 02/10/21  1253   GRAM STAIN RESULT  1+ Polys  No organisms seen   BODY FLUID CULTURE, STERILE  No growth     Results from last 7 days   Lab Units 02/10/21  1253   TOTAL COUNTED  100   WBC FLUID /ul 2,960           Vital Signs:     Date/  Time  Temp  Pulse  Resp  BP  MAP   SpO2  FiO2 (%)  Calculated FIO2 (%) - Nasal Cannula  O2 Flow Rate (L/min)   O2 Device     02/12/21 0929  --  70  --  127/65  87  --  --  --  --   --     02/12/21 07:18:11  99 8 °F (37 7 °C)  92  18  --  --  94 %  --  --  --   --     02/11/21 1036  --  --  --  --  --  93 %  --  28  --   Nasal cannula     02/11/21 22:09:16  -- 107  Abnormal   15  133/66  --  87 %Abnormal   --  --  --   --     02/11/21 1601  --  --  --  --  --  --  --  32  --   Nasal cannula     02/11/21 15:05:11  98 4 °F (36 9 °C)  84  21  141/73  96  97 %  --  --  --   --     02/11/21 1251  --  --  --  116/58  --  --  --  --  --   --     02/11/21 0900  --  --  --  --  --  --  --  32  --   Nasal cannula     02/11/21 0812  --  --  --  --  --  98 %  --  24  --   Mid flow nasal cannula     02/11/21 07:36:27  98 5 °F (36 9 °C)  85  18  135/68  90  97 %  --  --  --   --     02/11/21 0238  --  --  --  --  --  94 %  --  60  10 L/min   Mid flow nasal cannula       Blood Gases  (Last 48 hours)    02/10 1506     pH, Arterial 7 477High          pCO2, Arterial 52  6High          pO2, Arterial 23 4Low Panic           HCO3, Arterial 38  0High             Respiratory Data      02/10 1225  Most Recent   Non-Invasive Ventilation Mode   [address] (M  IPAP (cm) (cm)   12   EPAP (cm) (cm)   8   FiO2 (%)   75   Pressure Support (cm H2O)   4   O2 Device Nasal c  Nasal c    O2 Interface Device   MFNC pr  O2 Therapy   Oxygen        O2 Flow Rate (L/min) (L/min) 4  10   Nasal Cannula O2 Flow Rate (L/min) (L/min)   2                    Medications:   Scheduled Medications:  cholecalciferol, 2,000 Units, Oral, Daily  Diclofenac Sodium, 4 g, Topical, 4x Daily  enoxaparin, 1 mg/kg, Subcutaneous, Q12H LIZETT  furosemide, 40 mg, Oral, Daily  gabapentin, 400 mg, Oral, TID  glycopyrrolate, 1 mg, Oral, TID  insulin glargine, 10 Units, Subcutaneous, HS  insulin lispro, 1-5 Units, Subcutaneous, TID AC  insulin lispro, 3 Units, Subcutaneous, TID With Meals  lisinopril, 10 mg, Oral, Daily  methadone, 70 mg, Oral, Daily  SAMREEN ANTIFUNGAL, 1 application, Topical, TID  multivitamin-minerals, 1 tablet, Oral, Daily  omeprazole (PRILOSEC) suspension 2 mg/mL, 20 mg, Oral, Early Morning  polyethylene glycol, 17 g, Oral, Daily  senna, 1 tablet, Oral, BID      Continuous IV Infusions:     PRN Meds:  acetaminophen, 650 mg, Oral, Q6H PRN  albuterol, 2 puff, Inhalation, Q4H PRN  bisacodyl, 10 mg, Rectal, Daily PRN  Lidocaine Viscous HCl, 15 mL, Swish & Spit, 4x Daily PRN  menthol-methyl salicylate, , Apply externally, 4x Daily PRN  oxyCODONE, 2 5 mg, Oral, Q6H PRN  phenol, 1 spray, Mouth/Throat, Q2H PRN  sodium chloride, 1 spray, Each Nare, Q1H PRN        Discharge Plan: To be determined     Network Utilization Review Department  ATTENTION: Please call with any questions or concerns to [phone] and carefully listen to the prompts so that you are directed to the right person  All voicemails are confidential   Aleja Yun all requests for admission clinical reviews, approved or denied determinations and any other requests to dedicated fax number below belonging to the campus where the patient is receiving treatment   List of dedicated fax numbers for the Facilities:  [address] DENIALS (Administrative/Medical Necessity) [phone]   [address] (Maternity/NICU/Pediatrics) [phone]   [address] [place] [address] [address] [place] [address] Dr Malia Robles [phone] (Mitchell Epps "Cara" 103) [address] [address] [address] Angeline Roxanne Almeida 1481 [address] [place]) [address] [place] [address] [phone]

## 2021-02-12 NOTE — PLAN OF CARE
Problem: Prexisting or High Potential for Compromised Skin Integrity  Goal: Skin integrity is maintained or improved  Description: INTERVENTIONS:  - Identify patients at risk for skin breakdown  - Assess and monitor skin integrity  - Assess and monitor nutrition and hydration status  - Monitor labs   - Assess for incontinence   - Turn and reposition patient  - Assist with mobility/ambulation  - Relieve pressure over bony prominences  - Avoid friction and shearing  - Provide appropriate hygiene as needed including keeping skin clean and dry  - Evaluate need for skin moisturizer/barrier cream  - Collaborate with interdisciplinary team   - Patient/family teaching  - Consider wound care consult   Outcome: Progressing     Problem: Potential for Falls  Goal: Patient will remain free of falls  Description: INTERVENTIONS:  - Assess patient frequently for physical needs  -  Identify cognitive and physical deficits and behaviors that affect risk of falls  -  [place] fall precautions as indicated by assessment   - Educate patient/family on patient safety including physical limitations  - Instruct patient to call for assistance with activity based on assessment  - Modify environment to reduce risk of injury  - Consider OT/PT consult to assist with strengthening/mobility  Outcome: Progressing     Problem: Nutrition/Hydration-ADULT  Goal: Nutrient/Hydration intake appropriate for improving, restoring or maintaining nutritional needs  Description: Monitor and assess patient's nutrition/hydration status for malnutrition  Collaborate with interdisciplinary team and initiate plan and interventions as ordered  Monitor patient's weight and dietary intake as ordered or per policy  Utilize nutrition screening tool and intervene as necessary  Determine patient's food preferences and provide high-protein, high-caloric foods as appropriate       INTERVENTIONS:  - Monitor oral intake, urinary output, labs, and treatment plans  - Assess nutrition and hydration status and recommend course of action  - Evaluate amount of meals eaten  - Assist patient with eating if necessary   - Allow adequate time for meals  - Recommend/ encourage appropriate diets, oral nutritional supplements, and vitamin/mineral supplements  - Order, calculate, and assess calorie counts as needed  - Assess need for intravenous fluids  - Provide specific nutrition/hydration education as appropriate  - Include patient/family/caregiver in decisions related to nutrition  Outcome: Progressing     Problem: PAIN - ADULT  Goal: Verbalizes/displays adequate comfort level or baseline comfort level  Description: Interventions:  - Encourage patient to monitor pain and request assistance  - Assess pain using appropriate pain scale  - Administer analgesics based on type and severity of pain and evaluate response  - Implement non-pharmacological measures as appropriate and evaluate response  - Consider cultural and social influences on pain and pain management  - Notify physician/advanced practitioner if interventions unsuccessful or patient reports new pain  Outcome: Progressing     Problem: INFECTION - ADULT  Goal: Absence or prevention of progression during hospitalization  Description: INTERVENTIONS:  - Assess and monitor for signs and symptoms of infection  - Monitor lab/diagnostic results  - Monitor all insertion sites, i e  indwelling lines, tubes, and drains  - [place] appropriate cooling/warming therapies per order  - Administer medications as ordered  - Instruct and encourage patient and family to use good hand hygiene technique  - Identify and instruct in appropriate isolation precautions for identified infection/condition  Outcome: Progressing  Goal: Absence of fever/infection during neutropenic period  Description: INTERVENTIONS:  - Monitor WBC    Outcome: Progressing     Problem: SAFETY ADULT  Goal: Patient will remain free of falls  Description: INTERVENTIONS:  - Assess patient frequently for physical needs  -  Identify cognitive and physical deficits and behaviors that affect risk of falls    -  [place] fall precautions as indicated by assessment   - Educate patient/family on patient safety including physical limitations  - Instruct patient to call for assistance with activity based on assessment  - Modify environment to reduce risk of injury  - Consider OT/PT consult to assist with strengthening/mobility  Outcome: Progressing  Goal: Maintain or return to baseline ADL function  Description: INTERVENTIONS:  -  Assess patient's ability to carry out ADLs; assess patient's baseline for ADL function and identify physical deficits which impact ability to perform ADLs (bathing, care of mouth/teeth, toileting, grooming, dressing, etc )  - Assess/evaluate cause of self-care deficits   - Assess range of motion  - Assess patient's mobility; develop plan if impaired  - Assess patient's need for assistive devices and provide as appropriate  - Encourage maximum independence but intervene and supervise when necessary  - Involve family in performance of ADLs  - Assess for home care needs following discharge   - Consider OT consult to assist with ADL evaluation and planning for discharge  - Provide patient education as appropriate  Outcome: Progressing  Goal: Maintain or return mobility status to optimal level  Description: INTERVENTIONS:  - Assess patient's baseline mobility status (ambulation, transfers, stairs, etc )    - Identify cognitive and physical deficits and behaviors that affect mobility  - Identify mobility aids required to assist with transfers and/or ambulation (gait belt, sit-to-stand, lift, walker, cane, etc )  - [place] fall precautions as indicated by assessment  - Record patient progress and toleration of activity level on Mobility SBAR; progress patient to next Phase/Stage  - Instruct patient to call for assistance with activity based on assessment  - Consider rehabilitation consult to assist with strengthening/weightbearing, etc   Outcome: Progressing     Problem: DISCHARGE PLANNING  Goal: Discharge to home or other facility with appropriate resources  Description: INTERVENTIONS:  - Identify barriers to discharge w/patient and caregiver  - Arrange for needed discharge resources and transportation as appropriate  - Identify discharge learning needs (meds, wound care, etc )  - Arrange for interpretive services to assist at discharge as needed  - Refer to Case Management Department for coordinating discharge planning if the patient needs post-hospital services based on physician/advanced practitioner order or complex needs related to functional status, cognitive ability, or social support system  Outcome: Progressing     Problem: Knowledge Deficit  Goal: Patient/family/caregiver demonstrates understanding of disease process, treatment plan, medications, and discharge instructions  Description: Complete learning assessment and assess knowledge base    Interventions:  - Provide teaching at level of understanding  - Provide teaching via preferred learning methods  Outcome: Progressing

## 2021-02-12 NOTE — PHYSICAL THERAPY NOTE
PHYSICAL THERAPY Re-Evaluation          Patient Name: Renetta Martins  GOJGW'A Date: 2/12/2021 02/12/21 1400   PT Last Visit   PT Visit Date 02/12/21   Note Type   Note type Re-Evaluation   Pain Assessment   Pain Assessment Tool Pain Assessment not indicated - pt denies pain   Patient's Stated Pain Goal No pain   Hospital Pain Intervention(s) Repositioned;Elevated; Rest   Home Living   Type of [address] One level  (1 step up to bedrrom)   Home Equipment   (No use of DME)   Prior Function   Level of [place] Independent with ADLs and functional mobility   Lives With Family  (siblings and nephew)   Comments Pt lives with siblings and nephews  Idependent baseline for all mobility  Restrictions/Precautions   Weight Bearing Precautions Per Order Yes   RLE Weight Bearing Per Order NWB  (R AKA)   Braces or Orthoses Other (Comment)  (L Multipodis boot )   Other Precautions WBS;O2;Fall Risk;Pain;Cognitive; Chair Alarm; Bed Alarm   Cognition   Overall Cognitive Status WFL   Arousal/Participation Alert   Attention Within functional limits   Orientation Level Oriented X4   Memory Within functional limits   Following Commands Follows one step commands with increased time or repetition   RLE Assessment   RLE Assessment WFL  (Functionally 3/5)   LLE Assessment   LLE Assessment WFL  (Functionally 3/5)   Strength LLE   LLE Overall Strength 3/5   Bed Mobility   Supine to Sit 3  Moderate assistance   Additional items Assist x 2;HOB elevated; Bedrails; Increased time required;Verbal cues   Sit to Supine 3  Moderate assistance   Additional items Assist x 2;HOB elevated; Bedrails; Increased time required;Verbal cues   Additional Comments Pt supine in bed upon PT arrival  Pt supine in bed with all needs in reach at the end of tx session  Transfers   Sit to Stand 2  Maximal assistance   Additional items Assist x 2; Increased time required;Verbal cues   Stand to Sit 2  Maximal assistance   Additional items Assist x 2; Increased time required;Verbal cues   Additional Comments Pt sat EOB with Mod A x1  Verbal and Tactile cues required to pick head up  Sit to stand transfer from bed using HHA and L knee block  Limited hip extension noted  Pt able to achieve 75% standing  Balance   Static Sitting Poor   Dynamic Sitting Poor -   Static Standing Poor -   Dynamic Standing Poor -   Endurance Deficit   Endurance Deficit Yes   Endurance Deficit Description Weakness   Activity Tolerance   Activity Tolerance Patient limited by fatigue   Medical Staff Made Aware MARIAELENA Sanjuana Luisa    Nurse Made Aware RN cleared pt to participate in tx session   Assessment   Prognosis Fair   Problem List Decreased strength;Decreased range of motion;Decreased endurance; Impaired balance;Decreased mobility; Impaired sensation;Orthopedic restrictions;Decreased safety awareness   Assessment Pt seen for re-eval this date due to  PT goals  Pt is a 56 y o male admitted to [facility] on 21 with complaints of wheezing, SOB, and weakness  Pt underwent surgery on 21 for embolectomy/thromboectomy on RLE  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 with completion of R AKA on 21  Pt  has a past medical history of Diabetes mellitus ([facility] [address] ), GERD (gastroesophageal reflux disease), Hypercholesteremia, Hypertension, and Methadone use ([facility] [address] )  He also has no past medical history of Cancer ([facility] [address] )  Pt resides at home with siblings and nephews in a first floor setup with 1 step to get to his bedroom  Pt is limited in strength, balance, endurance, and overall functional mobility  Increased alertness, mobility and improved effort demonstrated by performing standing trial this date  Pt still requires assist of 2 for bed mobility and transfers  Pt left supine in bed with all needs in reach at the end of tx session   Pt would benefit from continued PT services to improve overall functional mobility and independence  AM-PAC score of 7; Recommend d/c to short term rehab once medically cleared  Goals   Patient Goals To go home   STG Expiration Date 02/26/21   Short Term Goal #1 1  Pt will perform bed mobility with Min A x1 to improve functional mobility and independence  2  Pt will improve static and dynamic balance by 1 grade in order to improve functional mobility and independence  3  Pt will perform sit to stand transfer with Mod A x1 to improve functional mobility and independence  4  Pt will improve LE strength by 1 grade in order to improve functional mobility and independence  Plan   Treatment/Interventions Functional transfer training;LE strengthening/ROM; Therapeutic exercise; Endurance training;Patient/family training;Equipment eval/education; Bed mobility;Gait training;Spoke to nursing   PT Frequency   (3-5x/wk)   Recommendation   PT Discharge Recommendation Post-Acute Rehabilitation Services   Equipment Recommended Wheelchair;Walker; Other (Comment)  (Transfer board)   PT - OK to Discharge Yes  (To short term rehab once medically cleared )   Jayne 8 in Bed Without Bedrails 2   Lying on Back to Sitting on Edge of Flat Bed 1   Moving Bed to Chair 1   Standing Up From Chair 1   Walk in Room 1   Climb 3-5 Stairs 1   Basic Mobility Inpatient Raw Score 7   Modified Nickerson Scale   Modified Nickerson Scale 4     PT Treatment Note     S: Pt agreeable and cooperative to tx session this date  O: Pt performed bed mobility supine to sit  Pt sat EOB ~ 20 minutes  1 sit to stand trial attempted  A: Pt required assist of 2 for all mobility this date  Pt sat EOB with mod A x1  Pt performed dynamic reaching with moderate A x1 for trunk control  Verbal and tactile cues required to keep head up  Sit to stand trial attempted using HHA with Max Ax 2 and L knee block  Pt able to achieve 75% standing with limited hip extension noted     P: Pt to continue PT services while in the hospital  Recommend d/c to short term rehab once medically cleared       Time in: 1:40   Time out: 2:00  Total time: 20 minutes     Faiza Jamil, PT, DPT

## 2021-02-12 NOTE — PLAN OF CARE
Problem: PHYSICAL THERAPY ADULT  Goal: Performs mobility at highest level of function for planned discharge setting  See evaluation for individualized goals  Description: Treatment/Interventions: Functional transfer training, LE strengthening/ROM, Therapeutic exercise, Endurance training, Bed mobility          See flowsheet documentation for full assessment, interventions and recommendations  Outcome: Progressing  Note: Prognosis: Fair  Problem List: Decreased strength, Decreased range of motion, Decreased endurance, Impaired balance, Decreased mobility, Impaired sensation, Orthopedic restrictions, Decreased safety awareness  Assessment: Pt seen for re-eval this date due to  PT goals  Pt is a 56 y o male admitted to [facility] on 21 with complaints of wheezing, SOB, and weakness  Pt underwent surgery on 21 for embolectomy/thromboectomy on RLE  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 with completion of R AKA on 21  Pt  has a past medical history of Diabetes mellitus ([facility] [address] ), GERD (gastroesophageal reflux disease), Hypercholesteremia, Hypertension, and Methadone use ([facility] [address] )  He also has no past medical history of Cancer ([facility] [address] )  Pt resides at home with siblings and nephews in a first floor setup with 1 step to get to his bedroom  Pt is limited in strength, balance, endurance, and overall functional mobility  Increased alertness, mobility and improved effort demonstrated by performing standing trial this date  Pt still requires assist of 2 for bed mobility and transfers  Pt left supine in bed with all needs in reach at the end of tx session  Pt would benefit from continued PT services to improve overall functional mobility and independence  AM-PAC score of 7; Recommend d/c to short term rehab once medically cleared     Barriers to Discharge: Inaccessible home environment, Decreased caregiver support     PT Discharge Recommendation: Post-Acute Rehabilitation Services     PT - OK to Discharge: Yes(To short term rehab once medically cleared )    See flowsheet documentation for full assessment

## 2021-02-12 NOTE — PLAN OF CARE
Problem: OCCUPATIONAL THERAPY ADULT  Goal: Performs self-care activities at highest level of function for planned discharge setting  See evaluation for individualized goals  Description: Treatment Interventions: ADL retraining          See flowsheet documentation for full assessment, interventions and recommendations  Outcome: Progressing  Note: Limitation: Decreased ADL status, Decreased Safe judgement during ADL, Decreased UE strength, Decreased UE ROM, Decreased cognition, Decreased endurance, Decreased self-care trans, Decreased high-level ADLs, Decreased sensation, Decreased fine motor control, Visual deficit  Prognosis: Fair  Assessment: Pt participated in occupational therapy with focus on activity tolerance,  bed mob, unsupported sitting balance and tolerance for pt engagement in functional self-care task/grooming/shaving and washing face  Pt cleared by RN/[place] for pt participation in therapy  Pt received HOB raised/supine pt sitting upright and agreeable to therapy following pt Identifiers confirmed  Pt required assist for bed mob and unsupported sitting balance at edge of pt bed 2* pt decreased overall strength  Pt required verbal cues to hold up head and trunk throughout session 2* pt decreased strength  Pt cooperative and motivated with all therapy requests  Pt will require post acute rehab services to continue to address  pt deficits with decreased strength, coordination and balance which currently impair pt ADL and functional mob  Pt bed alarm active post session all needs within reach  OT Discharge Recommendation: Post-Acute Rehabilitation Services  OT - OK to Discharge:  Yes

## 2021-02-12 NOTE — ASSESSMENT & PLAN NOTE
· Extubated 1/25 and required NGT for nutrition   · Speech therapy following   · S/p barium swallow  · Continue modified diet  · Continue robinul  · S/p ENT consult, no vocal cord motion impairment

## 2021-02-12 NOTE — ASSESSMENT & PLAN NOTE
Lab Results   Component Value Date    HGBA1C 11 6 (H) 12/09/2020       Recent Labs     02/11/21 2208 02/12/21  0558 02/12/21  0932 02/12/21  1111   POCGLU 166* 223* 175* 161*       Blood Sugar Average: Last 72 hrs:  (P) 137 [id number]     · Continue Lantus 10 units with 3 units Humalog t i d  With meals  Continue sliding scale

## 2021-02-13 ENCOUNTER — APPOINTMENT (INPATIENT)
Dept: RADIOLOGY | Facility: HOSPITAL | Age: 61
DRG: 853 | End: 2021-02-13
Payer: COMMERCIAL

## 2021-02-13 LAB
ANION GAP SERPL CALCULATED.3IONS-SCNC: 1 MMOL/L (ref 4–13)
BUN SERPL-MCNC: 11 MG/DL (ref 5–25)
CALCIUM SERPL-MCNC: 8.9 MG/DL (ref 8.3–10.1)
CHLORIDE SERPL-SCNC: 101 MMOL/L (ref 100–108)
CO2 SERPL-SCNC: 37 MMOL/L (ref 21–32)
CREAT SERPL-MCNC: 0.5 MG/DL (ref 0.6–1.3)
ERYTHROCYTE [DISTWIDTH] IN BLOOD BY AUTOMATED COUNT: 16 % (ref 11.6–15.1)
GFR SERPL CREATININE-BSD FRML MDRD: 116 ML/MIN/1.73SQ M
GLUCOSE SERPL-MCNC: 115 MG/DL (ref 65–140)
GLUCOSE SERPL-MCNC: 116 MG/DL (ref 65–140)
GLUCOSE SERPL-MCNC: 122 MG/DL (ref 65–140)
GLUCOSE SERPL-MCNC: 132 MG/DL (ref 65–140)
GLUCOSE SERPL-MCNC: 146 MG/DL (ref 65–140)
HCT VFR BLD AUTO: 34.7 % (ref 36.5–49.3)
HGB BLD-MCNC: 10.3 G/DL (ref 12–17)
MAGNESIUM SERPL-MCNC: 1.5 MG/DL (ref 1.6–2.6)
MCH RBC QN AUTO: 27.8 PG (ref 26.8–34.3)
MCHC RBC AUTO-ENTMCNC: 29.7 G/DL (ref 31.4–37.4)
MCV RBC AUTO: 94 FL (ref 82–98)
NT-PROBNP SERPL-MCNC: 740 PG/ML
PLATELET # BLD AUTO: 368 THOUSANDS/UL (ref 149–390)
PMV BLD AUTO: 9.8 FL (ref 8.9–12.7)
POTASSIUM SERPL-SCNC: 3.3 MMOL/L (ref 3.5–5.3)
PROCALCITONIN SERPL-MCNC: 0.3 NG/ML
RBC # BLD AUTO: 3.71 MILLION/UL (ref 3.88–5.62)
SODIUM SERPL-SCNC: 139 MMOL/L (ref 136–145)
TROPONIN I SERPL-MCNC: <0.02 NG/ML
WBC # BLD AUTO: 15.91 THOUSAND/UL (ref 4.31–10.16)

## 2021-02-13 PROCEDURE — 93005 ELECTROCARDIOGRAM TRACING: CPT

## 2021-02-13 PROCEDURE — 83880 ASSAY OF NATRIURETIC PEPTIDE: CPT | Performed by: PHYSICIAN ASSISTANT

## 2021-02-13 PROCEDURE — 83735 ASSAY OF MAGNESIUM: CPT | Performed by: PHYSICIAN ASSISTANT

## 2021-02-13 PROCEDURE — 84145 PROCALCITONIN (PCT): CPT | Performed by: PHYSICIAN ASSISTANT

## 2021-02-13 PROCEDURE — G1004 CDSM NDSC: HCPCS

## 2021-02-13 PROCEDURE — 99232 SBSQ HOSP IP/OBS MODERATE 35: CPT | Performed by: INTERNAL MEDICINE

## 2021-02-13 PROCEDURE — 3E0L3GC INTRODUCTION OF OTHER THERAPEUTIC SUBSTANCE INTO PLEURAL CAVITY, PERCUTANEOUS APPROACH: ICD-10-PCS | Performed by: INTERNAL MEDICINE

## 2021-02-13 PROCEDURE — 84484 ASSAY OF TROPONIN QUANT: CPT | Performed by: PHYSICIAN ASSISTANT

## 2021-02-13 PROCEDURE — 70498 CT ANGIOGRAPHY NECK: CPT

## 2021-02-13 PROCEDURE — 70496 CT ANGIOGRAPHY HEAD: CPT

## 2021-02-13 PROCEDURE — 71250 CT THORAX DX C-: CPT

## 2021-02-13 PROCEDURE — 99233 SBSQ HOSP IP/OBS HIGH 50: CPT | Performed by: INTERNAL MEDICINE

## 2021-02-13 PROCEDURE — 80048 BASIC METABOLIC PNL TOTAL CA: CPT | Performed by: PHYSICIAN ASSISTANT

## 2021-02-13 PROCEDURE — 80061 LIPID PANEL: CPT | Performed by: PHYSICIAN ASSISTANT

## 2021-02-13 PROCEDURE — 71045 X-RAY EXAM CHEST 1 VIEW: CPT

## 2021-02-13 PROCEDURE — 99232 SBSQ HOSP IP/OBS MODERATE 35: CPT | Performed by: THORACIC SURGERY (CARDIOTHORACIC VASCULAR SURGERY)

## 2021-02-13 PROCEDURE — 82948 REAGENT STRIP/BLOOD GLUCOSE: CPT

## 2021-02-13 PROCEDURE — 85027 COMPLETE CBC AUTOMATED: CPT | Performed by: PHYSICIAN ASSISTANT

## 2021-02-13 RX ORDER — ONDANSETRON 2 MG/ML
4 INJECTION INTRAMUSCULAR; INTRAVENOUS EVERY 6 HOURS PRN
Status: DISCONTINUED | OUTPATIENT
Start: 2021-02-13 | End: 2021-02-25 | Stop reason: HOSPADM

## 2021-02-13 RX ORDER — POTASSIUM CHLORIDE 14.9 MG/ML
20 INJECTION INTRAVENOUS ONCE
Status: COMPLETED | OUTPATIENT
Start: 2021-02-13 | End: 2021-02-14

## 2021-02-13 RX ADMIN — GLYCOPYRROLATE 1 MG: 1 TABLET ORAL at 16:58

## 2021-02-13 RX ADMIN — MICAFUNGIN SODIUM 100 MG: 50 INJECTION, POWDER, LYOPHILIZED, FOR SOLUTION INTRAVENOUS at 11:25

## 2021-02-13 RX ADMIN — METHADONE HYDROCHLORIDE 70 MG: 10 TABLET ORAL at 10:15

## 2021-02-13 RX ADMIN — LISINOPRIL 10 MG: 10 TABLET ORAL at 09:52

## 2021-02-13 RX ADMIN — Medication 1 TABLET: at 09:49

## 2021-02-13 RX ADMIN — Medication 20 MG: at 05:49

## 2021-02-13 RX ADMIN — DICLOFENAC SODIUM 4 G: 10 GEL TOPICAL at 09:51

## 2021-02-13 RX ADMIN — DORNASE ALFA 5 MG: 1 SOLUTION RESPIRATORY (INHALATION) at 13:00

## 2021-02-13 RX ADMIN — SENNOSIDES 8.6 MG: 8.6 TABLET, FILM COATED ORAL at 17:00

## 2021-02-13 RX ADMIN — ENOXAPARIN SODIUM 70 MG: 80 INJECTION SUBCUTANEOUS at 09:49

## 2021-02-13 RX ADMIN — DICLOFENAC SODIUM 4 G: 10 GEL TOPICAL at 11:23

## 2021-02-13 RX ADMIN — FUROSEMIDE 40 MG: 40 TABLET ORAL at 09:49

## 2021-02-13 RX ADMIN — ALTEPLASE 10 MG: 2.2 INJECTION, POWDER, LYOPHILIZED, FOR SOLUTION INTRAVENOUS at 13:00

## 2021-02-13 RX ADMIN — MICONAZOLE NITRATE 1 APPLICATION: 20 CREAM TOPICAL at 09:51

## 2021-02-13 RX ADMIN — GABAPENTIN 400 MG: 400 CAPSULE ORAL at 09:50

## 2021-02-13 RX ADMIN — GABAPENTIN 400 MG: 400 CAPSULE ORAL at 16:58

## 2021-02-13 RX ADMIN — DICLOFENAC SODIUM 4 G: 10 GEL TOPICAL at 17:00

## 2021-02-13 RX ADMIN — GLYCOPYRROLATE 1 MG: 1 TABLET ORAL at 09:52

## 2021-02-13 RX ADMIN — MICONAZOLE NITRATE 1 APPLICATION: 20 CREAM TOPICAL at 16:58

## 2021-02-13 RX ADMIN — Medication 2000 UNITS: at 09:49

## 2021-02-13 RX ADMIN — IOHEXOL 85 ML: 350 INJECTION, SOLUTION INTRAVENOUS at 23:03

## 2021-02-13 NOTE — ASSESSMENT & PLAN NOTE
Lab Results   Component Value Date    HGBA1C 11 6 (H) 12/09/2020       Recent Labs     02/12/21  1552 02/12/21  2347 02/13/21  0704 02/13/21  1101   POCGLU 119 192* 122 146*       Blood Sugar Average: Last 72 hrs:  (P) 138     · Continue Lantus 10 units with 3 units Humalog t i d  With meals  Continue sliding scale

## 2021-02-13 NOTE — PROGRESS NOTES
Progress Note - Thoracic Surgery   Dustin Bruce 56 y o  male MRN: [id number]  Unit/Bed#: [facility] 713-01 Encounter: [id number]    Assessment:  57 y/o M p/w COVID-19 pneumonia, s/p prolonged intubation, now with L hydropneumothorax despite L CT in place    Plan:  --f/u CT Chest read  --Consider antifungal treatment given pleural cx positive for Candida  --May require eventual operative intervention  --Nutrition c/s for recs    Subjective/Objective     Subjective:     No acute events overnight  Pt on 4L NC this AM  Denies any SOB  Objective:     Blood pressure 122/65, pulse 91, temperature 100 3 °F (37 9 °C), resp  rate 16, height 5' 6" (1 676 m), weight 21 kg (46 lb 4 8 oz), SpO2 98 %  ,Body mass index is 7 47 kg/m²  Intake/Output Summary (Last 24 hours) at 2/13/2021 0256  Last data filed at 2/12/2021 1805  Gross per 24 hour   Intake 240 ml   Output 625 ml   Net -385 ml       Invasive Devices     Peripheral Intravenous Line            Peripheral IV 02/11/21 Left;Ventral (anterior) Forearm 1 day          Drain            Chest Tube 1 Left Midaxillary;Pleural 10 Fr  2 days                Physical Exam:     GEN: NAD  HEENT: MMM  Chest: L CT to suction with no output, -AL  CV: RRR  Lung: normal effort  Ab: Soft, NT/ND  Extrem: No CCE  Neuro:  A+Ox3, motor and sensation grossly intact    Lab, Imaging and other studies:  CBC:   Lab Results   Component Value Date    WBC 7 24 02/12/2021    HGB 8 0 (L) 02/12/2021    HCT 27 1 (L) 02/12/2021    MCV 95 02/12/2021     02/12/2021    MCH 28 1 02/12/2021    MCHC 29 5 (L) 02/12/2021    RDW 16 0 (H) 02/12/2021    MPV 10 0 02/12/2021   , CMP: No results found for: SODIUM, K, CL, CO2, ANIONGAP, BUN, CREATININE, GLUCOSE, CALCIUM, AST, ALT, ALKPHOS, PROT, BILITOT, EGFR, Coagulation: No results found for: PT, INR, APTT, Urinalysis: No results found for: COLORU, CLARITYU, SPECGRAV, PHUR, LEUKOCYTESUR, NITRITE, PROTEINUA, GLUCOSEU, KETONESU, BILIRUBINUR, BLOODU  VTE Pharmacologic Prophylaxis: Enoxaparin (Lovenox)  VTE Mechanical Prophylaxis: sequential compression device

## 2021-02-13 NOTE — ASSESSMENT & PLAN NOTE
Status post chest tube placement  Currently connected to suction  Patient's respiratory status has improved  Oxygen requirements back to 2-3 L  Pulmonology following  CT chest done yesterday shows persistent moderate size left hydropneumothorax which is partially loculated  His thoracic surgery was in walled per pulmonology  Currently no acute procedures indicated  Plan for tPA and dornase instillation through the chest tube today to see if it increases the chest tube output

## 2021-02-13 NOTE — PLAN OF CARE
Problem: Prexisting or High Potential for Compromised Skin Integrity  Goal: Skin integrity is maintained or improved  Description: INTERVENTIONS:  - Identify patients at risk for skin breakdown  - Assess and monitor skin integrity  - Assess and monitor nutrition and hydration status  - Monitor labs   - Assess for incontinence   - Turn and reposition patient  - Assist with mobility/ambulation  - Relieve pressure over bony prominences  - Avoid friction and shearing  - Provide appropriate hygiene as needed including keeping skin clean and dry  - Evaluate need for skin moisturizer/barrier cream  - Collaborate with interdisciplinary team   - Patient/family teaching  - Consider wound care consult   Outcome: Progressing     Problem: Potential for Falls  Goal: Patient will remain free of falls  Description: INTERVENTIONS:  - Assess patient frequently for physical needs  -  Identify cognitive and physical deficits and behaviors that affect risk of falls  -  [place] fall precautions as indicated by assessment   - Educate patient/family on patient safety including physical limitations  - Instruct patient to call for assistance with activity based on assessment  - Modify environment to reduce risk of injury  - Consider OT/PT consult to assist with strengthening/mobility  Outcome: Progressing     Problem: Nutrition/Hydration-ADULT  Goal: Nutrient/Hydration intake appropriate for improving, restoring or maintaining nutritional needs  Description: Monitor and assess patient's nutrition/hydration status for malnutrition  Collaborate with interdisciplinary team and initiate plan and interventions as ordered  Monitor patient's weight and dietary intake as ordered or per policy  Utilize nutrition screening tool and intervene as necessary  Determine patient's food preferences and provide high-protein, high-caloric foods as appropriate       INTERVENTIONS:  - Monitor oral intake, urinary output, labs, and treatment plans  - Assess nutrition and hydration status and recommend course of action  - Evaluate amount of meals eaten  - Assist patient with eating if necessary   - Allow adequate time for meals  - Recommend/ encourage appropriate diets, oral nutritional supplements, and vitamin/mineral supplements  - Order, calculate, and assess calorie counts as needed  - Assess need for intravenous fluids  - Provide specific nutrition/hydration education as appropriate  - Include patient/family/caregiver in decisions related to nutrition  Outcome: Progressing     Problem: PAIN - ADULT  Goal: Verbalizes/displays adequate comfort level or baseline comfort level  Description: Interventions:  - Encourage patient to monitor pain and request assistance  - Assess pain using appropriate pain scale  - Administer analgesics based on type and severity of pain and evaluate response  - Implement non-pharmacological measures as appropriate and evaluate response  - Consider cultural and social influences on pain and pain management  - Notify physician/advanced practitioner if interventions unsuccessful or patient reports new pain  Outcome: Progressing     Problem: INFECTION - ADULT  Goal: Absence or prevention of progression during hospitalization  Description: INTERVENTIONS:  - Assess and monitor for signs and symptoms of infection  - Monitor lab/diagnostic results  - Monitor all insertion sites, i e  indwelling lines, tubes, and drains  - [place] appropriate cooling/warming therapies per order  - Administer medications as ordered  - Instruct and encourage patient and family to use good hand hygiene technique  - Identify and instruct in appropriate isolation precautions for identified infection/condition  Outcome: Progressing  Goal: Absence of fever/infection during neutropenic period  Description: INTERVENTIONS:  - Monitor WBC    Outcome: Progressing     Problem: SAFETY ADULT  Goal: Patient will remain free of falls  Description: INTERVENTIONS:  - Assess patient frequently for physical needs  -  Identify cognitive and physical deficits and behaviors that affect risk of falls    -  [place] fall precautions as indicated by assessment   - Educate patient/family on patient safety including physical limitations  - Instruct patient to call for assistance with activity based on assessment  - Modify environment to reduce risk of injury  - Consider OT/PT consult to assist with strengthening/mobility  Outcome: Progressing  Goal: Maintain or return to baseline ADL function  Description: INTERVENTIONS:  -  Assess patient's ability to carry out ADLs; assess patient's baseline for ADL function and identify physical deficits which impact ability to perform ADLs (bathing, care of mouth/teeth, toileting, grooming, dressing, etc )  - Assess/evaluate cause of self-care deficits   - Assess range of motion  - Assess patient's mobility; develop plan if impaired  - Assess patient's need for assistive devices and provide as appropriate  - Encourage maximum independence but intervene and supervise when necessary  - Involve family in performance of ADLs  - Assess for home care needs following discharge   - Consider OT consult to assist with ADL evaluation and planning for discharge  - Provide patient education as appropriate  Outcome: Progressing  Goal: Maintain or return mobility status to optimal level  Description: INTERVENTIONS:  - Assess patient's baseline mobility status (ambulation, transfers, stairs, etc )    - Identify cognitive and physical deficits and behaviors that affect mobility  - Identify mobility aids required to assist with transfers and/or ambulation (gait belt, sit-to-stand, lift, walker, cane, etc )  - [place] fall precautions as indicated by assessment  - Record patient progress and toleration of activity level on Mobility SBAR; progress patient to next Phase/Stage  - Instruct patient to call for assistance with activity based on assessment  - Consider rehabilitation consult to assist with strengthening/weightbearing, etc   Outcome: Progressing     Problem: DISCHARGE PLANNING  Goal: Discharge to home or other facility with appropriate resources  Description: INTERVENTIONS:  - Identify barriers to discharge w/patient and caregiver  - Arrange for needed discharge resources and transportation as appropriate  - Identify discharge learning needs (meds, wound care, etc )  - Arrange for interpretive services to assist at discharge as needed  - Refer to Case Management Department for coordinating discharge planning if the patient needs post-hospital services based on physician/advanced practitioner order or complex needs related to functional status, cognitive ability, or social support system  Outcome: Progressing     Problem: Knowledge Deficit  Goal: Patient/family/caregiver demonstrates understanding of disease process, treatment plan, medications, and discharge instructions  Description: Complete learning assessment and assess knowledge base    Interventions:  - Provide teaching at level of understanding  - Provide teaching via preferred learning methods  Outcome: Progressing

## 2021-02-13 NOTE — ASSESSMENT & PLAN NOTE
Patient had a fever of 101 8 previous night and 100 8 after that  None since then  Otherwise asymptomatic  No evidence of pneumonia on x-ray  Pleural fluid cultures growing Candida  Patient started on anti fungal today per pulmonology

## 2021-02-13 NOTE — PROGRESS NOTES
Progress Note Charls Lanes 1960, 56 y o  male MRN: [id number]    Unit/Bed#: [facility] 713-01 Encounter: [id number]    Primary Care Provider: VERONICA Torres   Date and time admitted to hospital: 1/13/2021  2:28 PM        * Pneumonia due to COVID-19 virus  Assessment & Plan  · Tested positive for covid 12/31/2020  · S/p completion of covid tx  · S/p completion of IV abx for possible superimposed bacterial pna  · Considered recovered; off isolation    Fever  Assessment & Plan  Patient had a fever of 101 8 previous night and 100 8 after that  None since then  Otherwise asymptomatic  No evidence of pneumonia on x-ray  Pleural fluid cultures growing Candida  Patient started on anti fungal today per pulmonology  Left Hydropneumothorax  Assessment & Plan  Status post chest tube placement  Currently connected to suction  Patient's respiratory status has improved  Oxygen requirements back to 2-3 L  Pulmonology following  CT chest done yesterday shows persistent moderate size left hydropneumothorax which is partially loculated  His thoracic surgery was in walled per pulmonology  Currently no acute procedures indicated  Plan for tPA and dornase instillation through the chest tube today to see if it increases the chest tube output  Ischemia of right lower extremity with suspected rhabdomyolysis  Assessment & Plan  · Suspected embolic from aortic thrombus  · 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  · Continue therapeutic Lovenox   · Currently stable from a vascular surgery standpoint    Acute respiratory failure with hypoxia ([address] )  Assessment & Plan  · Secondary to COVID 19  · Extubated 1/25  · O2 requirements worsen because of hydropneumothorax  Now improved and back to 2-3 L  Right femoral vein DVT (HCC)  Assessment & Plan  Continue Lovenox       Oropharyngeal dysphagia  Assessment & Plan  · Extubated 1/25 and required NGT for nutrition   · Speech therapy following   · S/p barium swallow  · Continue modified diet  · Continue robinul  · S/p ENT consult, no vocal cord motion impairment  Myopathy  Assessment & Plan  · Profound weakness, predominantly in right upper extremity, especially due to critical illness and steroid use  · Plan for rehab at discharge     Type 2 diabetes mellitus, with long-term current use of insulin [facility])  Assessment & Plan  Lab Results   Component Value Date    HGBA1C 11 6 (H) 2020       Recent Labs     21  1552 21  2347 21  0704 21  1101   POCGLU 119 192* 122 146*       Blood Sugar Average: Last 72 hrs:  (P) 138     · Continue Lantus 10 units with 3 units Humalog t i d  With meals  Continue sliding scale  Methadone maintenance therapy patient [facility])  Assessment & Plan  Chronic methadone use        VTE Pharmacologic Prophylaxis:   Pharmacologic: Enoxaparin (Lovenox)  Mechanical VTE Prophylaxis in Place: Yes    Patient Centered Rounds: I have performed bedside rounds with nursing staff today  Discussions with Specialists or Other Care Team Provider: pulmonary    Education and Discussions with Family / Patient: pt  Called and updated Nolan    Time Spent for Care: 20 minutes  More than 50% of total time spent on counseling and coordination of care as described above  Current Length of Stay: 31 day(s)    Current Patient Status: Inpatient   Certification Statement: The patient will continue to require additional inpatient hospital stay due to above    Discharge Plan:     Code Status: Level 1 - Full Code      Subjective:   Pt seen and examined by me this morning  Pt denied any specific complaints  No pain  No shortness of breath  Objective:     Vitals:   Temp (24hrs), Av 5 °F (36 9 °C), Min:98 5 °F (36 9 °C), Max:98 5 °F (36 9 °C)    Temp:  [98 5 °F (36 9 °C)] 98 5 °F (36 9 °C)  HR:  [84] 84  Resp:  [17-20] 17  BP: (126-129)/(70-86) 129/86  SpO2:  [96 %] 96 %  Body mass index is 7 47 kg/m²       Input and Output Summary (last 24 hours): Intake/Output Summary (Last 24 hours) at 2/13/2021 1603  Last data filed at 2/12/2021 1805  Gross per 24 hour   Intake --   Output 250 ml   Net -250 ml       Physical Exam:     Physical Exam    Constitutional: Pt appears comfortable  Not in any acute distress  Cardiovascular: Normal rate, regular rhythm, normal heart sounds  No murmur heard  Pulmonary/Chest: Effort normal, air entry decreased on left  No respiratory distress  Pt has no wheezes or crackles  Abdominal: Soft  Non-distended, Non-tender  Bowel sounds are normal    Neurological: awake, alert  Psychiatric: normal mood and affect  Additional Data:     Labs:    Results from last 7 days   Lab Units 02/12/21  0935  02/07/21  0544   WBC Thousand/uL 7 24   < > 7 83   HEMOGLOBIN g/dL 8 0*   < > 8 4*   HEMATOCRIT % 27 1*   < > 28 4*   PLATELETS Thousands/uL 215   < > 232   NEUTROS PCT %  --   --  74   LYMPHS PCT %  --   --  13*   MONOS PCT %  --   --  11   EOS PCT %  --   --  1    < > = values in this interval not displayed  Results from last 7 days   Lab Units 02/11/21  0949   SODIUM mmol/L 138   POTASSIUM mmol/L 3 7   CHLORIDE mmol/L 100   CO2 mmol/L 35*   BUN mg/dL 13   CREATININE mg/dL 0 40*   ANION GAP mmol/L 3*   CALCIUM mg/dL 8 0*   GLUCOSE RANDOM mg/dL 96         Results from last 7 days   Lab Units 02/13/21  1101 02/13/21  0704 02/12/21  2347 02/12/21  1552 02/12/21  1111 02/12/21  0932 02/12/21  0558 02/11/21  2208 02/11/21  2111 02/11/21  1615 02/11/21  1046 02/11/21  0638   POC GLUCOSE mg/dl 146* 122 192* 119 161* 175* 223* 166* 65 183* 105 116         Results from last 7 days   Lab Units 02/11/21  0536 02/10/21  0642   PROCALCITONIN ng/ml 0 27* 0 31*           * I Have Reviewed All Lab Data Listed Above  * Additional Pertinent Lab Tests Reviewed:  All Labs For Current Hospital Admission Reviewed    Imaging:    Imaging Reports Reviewed Today Include:   Imaging Personally Reviewed by Myself Includes:     Recent Cultures (last 7 days):     Results from last 7 days   Lab Units 02/10/21  1253   GRAM STAIN RESULT  1+ Polys  No organisms seen   BODY FLUID CULTURE, STERILE  Few Colonies of Candida albicans*       Last 24 Hours Medication List:   Current Facility-Administered Medications   Medication Dose Route Frequency Provider Last Rate    acetaminophen  650 mg Oral Q6H PRN Shannan A VERONICA Hopkins      albuterol  2 puff Inhalation Q4H PRN Hermila Shelley DO      bisacodyl  10 mg Rectal Daily PRN VERONICA Kim      cholecalciferol  2,000 Units Oral Daily Shannan A Sedora, VERONICA      Diclofenac Sodium  4 g Topical 4x Daily Shannan A Sedora, VERONICA      enoxaparin  1 mg/kg Subcutaneous Q12H [facility] Stephanie Shaffer MD      furosemide  40 mg Oral Daily Hermila Shelley DO      gabapentin  400 mg Oral TID Shannan A SedoraVERONICA      glycopyrrolate  1 mg Oral TID Shannan A VERONICA Hopkins      insulin glargine  10 Units Subcutaneous HS Georgi Caban MD      insulin lispro  1-5 Units Subcutaneous TID AC Georgi Caban MD      insulin lispro  3 Units Subcutaneous TID With Meals Georgi Caban MD      Lidocaine Viscous HCl  15 mL Swish & Spit 4x Daily PRN Shannan A SedVERONICA panda      lisinopril  10 mg Oral Daily Shannan A SedoraVERONICA      menthol-methyl salicylate   Apply externally 4x Daily PRN VERONICA Kim      methadone  70 mg Oral Daily Hermila Shelley DO      micafungin (MYCAMINE) 100 mL IVPB  100 mg Intravenous Daily Sussy DO Merari 100 mg (02/13/21 1125)    SAMREEN ANTIFUNGAL  1 application Topical TID Hetul Mccall DO      multivitamin-minerals  1 tablet Oral Daily Shannan A Sedora, VERONICA      omeprazole (PRILOSEC) suspension 2 mg/mL  20 mg Oral Early Morning Shannan A Sedora, VERONICA      oxyCODONE  2 5 mg Oral Q6H PRN Stephanie Shaffer MD      phenol  1 spray Mouth/Throat Q2H PRN Shannan A SedoraVERONICA      polyethylene glycol  17 g Oral Daily Shannan A SedoraVERONICA      senna  1 tablet Oral BID Shannan Hopkins, CRNP      sodium chloride  1 spray Each Nare Q1H PRN Baldo Whitfield, DO          Today, Patient Was Seen By: Yosef Tafoya MD    ** Please Note: Dictation voice to text software may have been used in the creation of this document   **

## 2021-02-13 NOTE — QUICK NOTE
Interval Thoracic Surgery Note    TPA/dornase instilled into left-sided chest tube and clamped in typical fashion  Patient tolerated the procedure well  Will unclamp after 4 hrs at 1700  Discussed with nurse Latasha Peralta MD

## 2021-02-13 NOTE — PROGRESS NOTES
PULMONOLOGY PROGRESS NOTE     Name: Cruz Holder   Age & Sex: 56 y o  male   MRN: [id number]  Unit/Bed#: [facility] 713-01   Encounter: [id number]    PATIENT INFORMATION     Name: Cruz Holder   Age & Sex: 56 y o  male   MRN: [id number]  Hospital Stay Days: 32    ASSESSMENT/PLAN     Principal Problem:    Pneumonia due to COVID-19 virus  Active Problems:    HTN (hypertension)    Methadone maintenance therapy patient ([place] [id number] )    BMI 28 0-28 9,adult    Type 2 diabetes mellitus, with long-term current use of insulin ([facility])    Acute respiratory failure with hypoxia ([place] [address] )    Sepsis due to COVID-19 [facility])    Aortic thrombus ([facility])    Anemia    Myopathy    Oropharyngeal dysphagia    Edema of right upper extremity    Ischemia of right lower extremity with suspected rhabdomyolysis    Urinary retention    Right femoral vein DVT ([facility])    Fever    Left Hydropneumothorax      Assessment/plan:    1  Acute hypoxic respiratory failure - secondary to hydropneumothorax and bilateral ground-glass opacities related to for COVID-19 infection and loculated pleural effusions  2  Hydropneumothorax  3  Loculated pleural effusion/empyema - growing Candida  4  COVID-19 pneumonia  5  History of tobacco use  6  Right lower extremity emboli - status post embolectomy and BKA    -continue supplemental oxygen titrate as tolerated; goal SpO2 > 88%  -status post left chest tube placement; leave to suction at -20  -thoracic surgery on board; needs operative intervention but poor surgical candidate  Trial of tPA via chest tube  -start IV micafungin (hospital formulary) to treat Candida growth in pleural fluid; will require approximately 2 weeks of treatment  May be able to transition to oral fluconazole within 5-7 days after IV treatment of micafungin pending clinical response   -will see again on Monday; please call with any questions or concerns    SUBJECTIVE     Patient seen and examined  No acute events overnight    Patient resting comfortably in no acute distress  Patient reports shortness of breath and fatigue  OBJECTIVE     Vitals:    21 0718 21 0929 21 1527 21 0952   BP:  127/65 122/65 126/70   BP Location:  Left arm     Pulse: 92 70 91    Resp: 18  16    Temp: 99 8 °F (37 7 °C)  100 3 °F (37 9 °C)    TempSrc:       SpO2: 94%  98%    Weight:       Height:          Temperature:   Temp (24hrs), Av 3 °F (37 9 °C), Min:100 3 °F (37 9 °C), Max:100 3 °F (37 9 °C)    Temperature: 100 3 °F (37 9 °C)  Intake & Output:  I/O       701 -  07 -  07 -  07    P  O  0 240     Total Intake(mL/kg) 0 (0) 240 (11 4)     Urine (mL/kg/hr) 600 (1 2) 625 (1 2)     Stool 0      Chest Tube 125      Total Output 725 625     Net -725 -385            Unmeasured Urine Occurrence 1 x      Unmeasured Stool Occurrence 0 x          Weights:   IBW: 63 8 kg    Body mass index is 7 47 kg/m²  Weight (last 2 days)     Date/Time   Weight    21 06   21 (46 3)    21 06   --    Weight: patients bed not zeroed at 21 0600            Physical Exam  Vitals signs reviewed  Constitutional:       General: He is not in acute distress  Appearance: He is ill-appearing  He is not toxic-appearing or diaphoretic  HENT:      Head: Normocephalic and atraumatic  Right Ear: External ear normal       Left Ear: External ear normal    Eyes:      General: No scleral icterus  Right eye: No discharge  Left eye: No discharge  Extraocular Movements: Extraocular movements intact  Conjunctiva/sclera: Conjunctivae normal    Cardiovascular:      Rate and Rhythm: Normal rate and regular rhythm  Pulses: Normal pulses  Heart sounds: Normal heart sounds  No murmur  No friction rub  No gallop  Pulmonary:      Effort: No respiratory distress  Breath sounds: No stridor  Rales present  No wheezing or rhonchi        Comments: Diminished breath sounds noted mid to lower left lung field with faint rales/crackles  Clear to auscultation on right side  Chest:      Chest wall: No tenderness  Abdominal:      General: Bowel sounds are normal  There is no distension  Palpations: Abdomen is soft  Tenderness: There is no abdominal tenderness  There is no guarding  Musculoskeletal:      Left lower leg: No edema  Skin:     General: Skin is warm and dry  Neurological:      Mental Status: He is alert and oriented to person, place, and time  LABORATORY DATA     Labs: I have personally reviewed pertinent reports  Results from last 7 days   Lab Units 02/12/21  0935 02/11/21  1004 02/10/21  0642  02/07/21  0544   WBC Thousand/uL 7 24 8 24 8 79   < > 7 83   HEMOGLOBIN g/dL 8 0* 7 3* 8 6*   < > 8 4*   HEMATOCRIT % 27 1* 24 3* 29 1*   < > 28 4*   PLATELETS Thousands/uL 215 231 209   < > 232   NEUTROS PCT %  --   --   --   --  74   MONOS PCT %  --   --   --   --  11    < > = values in this interval not displayed  Results from last 7 days   Lab Units 02/11/21  0949 02/10/21  0642 02/09/21  1031   POTASSIUM mmol/L 3 7 3 7 3 6   CHLORIDE mmol/L 100 100 101   CO2 mmol/L 35* 35* 36*   BUN mg/dL 13 15 14   CREATININE mg/dL 0 40* 0 46* 0 46*   CALCIUM mg/dL 8 0* 8 5 8 5     Results from last 7 days   Lab Units 02/08/21  0626   MAGNESIUM mg/dL 1 9                          ABG:   Results from last 7 days   Lab Units 02/10/21  1506   PH ART  7 477*   PCO2 ART mm Hg 52 6*   PO2 ART mm Hg 23 4*   HCO3 ART mmol/L 38 0*   BASE EXC ART mmol/L 13 0   ABG SOURCE  Brachial, Left       IMAGING & DIAGNOSTIC TESTING     Radiology Results: I have personally reviewed pertinent reports  Xr Chest Portable    Result Date: 1/13/2021  Impression: 1  Interim slight improvement in bilateral interstitial and alveolar groundglass opacification is noted consistent with Covid 19 pneumonia with possible interim improvement of superimposed edema  2   Lines and tubes as noted   Workstation performed: [id number]     [employer] Chest Portable Icu    Result Date: 1/15/2021  Impression: 1  Stable opacities 2  Stable lines and tubes Workstation performed: [id number]     Other Diagnostic Testing: I have personally reviewed pertinent reports      ACTIVE MEDICATIONS     Current Facility-Administered Medications   Medication Dose Route Frequency    acetaminophen (TYLENOL) tablet 650 mg  650 mg Oral Q6H PRN    albuterol (PROVENTIL HFA,VENTOLIN HFA) inhaler 2 puff  2 puff Inhalation Q4H PRN    alteplase (TPA) 10 mg in SWFI 30 mL chest tube/drain tube instillation  10 mg Chest Tube Once    And    dornase rosalba (PULMOZYME) 5 mg in 30 mL SWFI chest tube/drain tube instillation  5 mg Chest Tube Once    bisacodyl (DULCOLAX) rectal suppository 10 mg  10 mg Rectal Daily PRN    cholecalciferol (VITAMIN D3) tablet 2,000 Units  2,000 Units Oral Daily    Diclofenac Sodium (VOLTAREN) 1 % topical gel 4 g  4 g Topical 4x Daily    enoxaparin (LOVENOX) subcutaneous injection 70 mg  1 mg/kg Subcutaneous Q12H LIZETT    furosemide (LASIX) tablet 40 mg  40 mg Oral Daily    gabapentin (NEURONTIN) capsule 400 mg  400 mg Oral TID    glycopyrrolate (ROBINUL) tablet 1 mg  1 mg Oral TID    insulin glargine (LANTUS) subcutaneous injection 10 Units 0 1 mL  10 Units Subcutaneous HS    insulin lispro (HumaLOG) 100 units/mL subcutaneous injection 1-5 Units  1-5 Units Subcutaneous TID AC    insulin lispro (HumaLOG) 100 units/mL subcutaneous injection 3 Units  3 Units Subcutaneous TID With Meals    Lidocaine Viscous HCl (XYLOCAINE) 2 % mucosal solution 15 mL  15 mL Swish & Spit 4x Daily PRN    lisinopril (ZESTRIL) tablet 10 mg  10 mg Oral Daily    menthol-methyl salicylate (BENGAY) 69-13 % cream   Apply externally 4x Daily PRN    methadone (DOLOPHINE) tablet 70 mg  70 mg Oral Daily    micafungin (MYCAMINE) 100 mg in sodium chloride 0 9 % 100 mL IVPB  100 mg Intravenous Daily    moisture barrier miconazole 2% cream (aka SAMREEN MOISTURE BARRIER ANTIFUNGAL CREAM)  1 application Topical TID    multivitamin-minerals (CENTRUM ADULTS) tablet 1 tablet  1 tablet Oral Daily    omeprazole (PRILOSEC) suspension 2 mg/mL  20 mg Oral Early Morning    oxyCODONE (ROXICODONE) IR tablet 2 5 mg  2 5 mg Oral Q6H PRN    phenol (CHLORASEPTIC) 1 4 % mucosal liquid 1 spray  1 spray Mouth/Throat Q2H PRN    polyethylene glycol (MIRALAX) packet 17 g  17 g Oral Daily    senna (SENOKOT) tablet 8 6 mg  1 tablet Oral BID    sodium chloride (OCEAN) 0 65 % nasal spray 1 spray  1 spray Each Nare Q1H PRN           Portions of the record may have been created with voice recognition software  Occasional wrong word or "sound a like" substitutions may have occurred due to the inherent limitations of voice recognition software    Read the chart carefully and recognize, using context, where substitutions have occurred   ==  Sussy No, [address] [facility] PGY-4

## 2021-02-13 NOTE — ASSESSMENT & PLAN NOTE
· Profound weakness, predominantly in right upper extremity, especially due to critical illness and steroid use  · Plan for rehab at discharge

## 2021-02-13 NOTE — PLAN OF CARE
Problem: Prexisting or High Potential for Compromised Skin Integrity  Goal: Skin integrity is maintained or improved  Description: INTERVENTIONS:  - Identify patients at risk for skin breakdown  - Assess and monitor skin integrity  - Assess and monitor nutrition and hydration status  - Monitor labs   - Assess for incontinence   - Turn and reposition patient  - Assist with mobility/ambulation  - Relieve pressure over bony prominences  - Avoid friction and shearing  - Provide appropriate hygiene as needed including keeping skin clean and dry  - Evaluate need for skin moisturizer/barrier cream  - Collaborate with interdisciplinary team   - Patient/family teaching  - Consider wound care consult   Outcome: Progressing     Problem: Potential for Falls  Goal: Patient will remain free of falls  Description: INTERVENTIONS:  - Assess patient frequently for physical needs  -  Identify cognitive and physical deficits and behaviors that affect risk of falls  -  [place] fall precautions as indicated by assessment   - Educate patient/family on patient safety including physical limitations  - Instruct patient to call for assistance with activity based on assessment  - Modify environment to reduce risk of injury  - Consider OT/PT consult to assist with strengthening/mobility  Outcome: Progressing     Problem: Nutrition/Hydration-ADULT  Goal: Nutrient/Hydration intake appropriate for improving, restoring or maintaining nutritional needs  Description: Monitor and assess patient's nutrition/hydration status for malnutrition  Collaborate with interdisciplinary team and initiate plan and interventions as ordered  Monitor patient's weight and dietary intake as ordered or per policy  Utilize nutrition screening tool and intervene as necessary  Determine patient's food preferences and provide high-protein, high-caloric foods as appropriate       INTERVENTIONS:  - Monitor oral intake, urinary output, labs, and treatment plans  - Assess nutrition and hydration status and recommend course of action  - Evaluate amount of meals eaten  - Assist patient with eating if necessary   - Allow adequate time for meals  - Recommend/ encourage appropriate diets, oral nutritional supplements, and vitamin/mineral supplements  - Order, calculate, and assess calorie counts as needed  - Assess need for intravenous fluids  - Provide specific nutrition/hydration education as appropriate  - Include patient/family/caregiver in decisions related to nutrition  Outcome: Progressing     Problem: PAIN - ADULT  Goal: Verbalizes/displays adequate comfort level or baseline comfort level  Description: Interventions:  - Encourage patient to monitor pain and request assistance  - Assess pain using appropriate pain scale  - Administer analgesics based on type and severity of pain and evaluate response  - Implement non-pharmacological measures as appropriate and evaluate response  - Consider cultural and social influences on pain and pain management  - Notify physician/advanced practitioner if interventions unsuccessful or patient reports new pain  Outcome: Progressing     Problem: INFECTION - ADULT  Goal: Absence or prevention of progression during hospitalization  Description: INTERVENTIONS:  - Assess and monitor for signs and symptoms of infection  - Monitor lab/diagnostic results  - Monitor all insertion sites, i e  indwelling lines, tubes, and drains  - [place] appropriate cooling/warming therapies per order  - Administer medications as ordered  - Instruct and encourage patient and family to use good hand hygiene technique  - Identify and instruct in appropriate isolation precautions for identified infection/condition  Outcome: Progressing  Goal: Absence of fever/infection during neutropenic period  Description: INTERVENTIONS:  - Monitor WBC    Outcome: Progressing     Problem: SAFETY ADULT  Goal: Patient will remain free of falls  Description: INTERVENTIONS:  - Assess patient frequently for physical needs  -  Identify cognitive and physical deficits and behaviors that affect risk of falls    -  [place] fall precautions as indicated by assessment   - Educate patient/family on patient safety including physical limitations  - Instruct patient to call for assistance with activity based on assessment  - Modify environment to reduce risk of injury  - Consider OT/PT consult to assist with strengthening/mobility  Outcome: Progressing  Goal: Maintain or return to baseline ADL function  Description: INTERVENTIONS:  -  Assess patient's ability to carry out ADLs; assess patient's baseline for ADL function and identify physical deficits which impact ability to perform ADLs (bathing, care of mouth/teeth, toileting, grooming, dressing, etc )  - Assess/evaluate cause of self-care deficits   - Assess range of motion  - Assess patient's mobility; develop plan if impaired  - Assess patient's need for assistive devices and provide as appropriate  - Encourage maximum independence but intervene and supervise when necessary  - Involve family in performance of ADLs  - Assess for home care needs following discharge   - Consider OT consult to assist with ADL evaluation and planning for discharge  - Provide patient education as appropriate  Outcome: Progressing  Goal: Maintain or return mobility status to optimal level  Description: INTERVENTIONS:  - Assess patient's baseline mobility status (ambulation, transfers, stairs, etc )    - Identify cognitive and physical deficits and behaviors that affect mobility  - Identify mobility aids required to assist with transfers and/or ambulation (gait belt, sit-to-stand, lift, walker, cane, etc )  - [place] fall precautions as indicated by assessment  - Record patient progress and toleration of activity level on Mobility SBAR; progress patient to next Phase/Stage  - Instruct patient to call for assistance with activity based on assessment  - Consider rehabilitation consult to assist with strengthening/weightbearing, etc   Outcome: Progressing     Problem: DISCHARGE PLANNING  Goal: Discharge to home or other facility with appropriate resources  Description: INTERVENTIONS:  - Identify barriers to discharge w/patient and caregiver  - Arrange for needed discharge resources and transportation as appropriate  - Identify discharge learning needs (meds, wound care, etc )  - Arrange for interpretive services to assist at discharge as needed  - Refer to Case Management Department for coordinating discharge planning if the patient needs post-hospital services based on physician/advanced practitioner order or complex needs related to functional status, cognitive ability, or social support system  Outcome: Progressing     Problem: Knowledge Deficit  Goal: Patient/family/caregiver demonstrates understanding of disease process, treatment plan, medications, and discharge instructions  Description: Complete learning assessment and assess knowledge base    Interventions:  - Provide teaching at level of understanding  - Provide teaching via preferred learning methods  Outcome: Progressing

## 2021-02-14 ENCOUNTER — APPOINTMENT (INPATIENT)
Dept: RADIOLOGY | Facility: HOSPITAL | Age: 61
DRG: 853 | End: 2021-02-14
Payer: COMMERCIAL

## 2021-02-14 PROBLEM — R29.898 RUE WEAKNESS: Status: ACTIVE | Noted: 2021-02-14

## 2021-02-14 PROBLEM — M62.81 MUSCLE WEAKNESS OF RIGHT UPPER EXTREMITY: Status: ACTIVE | Noted: 2021-01-30

## 2021-02-14 LAB
ATRIAL RATE: 120 BPM
ATRIAL RATE: 121 BPM
CHOLEST SERPL-MCNC: 123 MG/DL (ref 50–200)
GLUCOSE SERPL-MCNC: 190 MG/DL (ref 65–140)
GLUCOSE SERPL-MCNC: 191 MG/DL (ref 65–140)
GLUCOSE SERPL-MCNC: 205 MG/DL (ref 65–140)
GLUCOSE SERPL-MCNC: 228 MG/DL (ref 65–140)
HDLC SERPL-MCNC: 16 MG/DL
LDLC SERPL CALC-MCNC: 77 MG/DL (ref 0–100)
NONHDLC SERPL-MCNC: 107 MG/DL
P AXIS: 45 DEGREES
P AXIS: 51 DEGREES
PR INTERVAL: 136 MS
PR INTERVAL: 138 MS
QRS AXIS: 24 DEGREES
QRS AXIS: 25 DEGREES
QRSD INTERVAL: 68 MS
QRSD INTERVAL: 70 MS
QT INTERVAL: 264 MS
QT INTERVAL: 272 MS
QTC INTERVAL: 374 MS
QTC INTERVAL: 384 MS
T WAVE AXIS: 54 DEGREES
T WAVE AXIS: 55 DEGREES
TRIGL SERPL-MCNC: 151 MG/DL
VENTRICULAR RATE: 120 BPM
VENTRICULAR RATE: 121 BPM

## 2021-02-14 PROCEDURE — 99232 SBSQ HOSP IP/OBS MODERATE 35: CPT | Performed by: THORACIC SURGERY (CARDIOTHORACIC VASCULAR SURGERY)

## 2021-02-14 PROCEDURE — 93010 ELECTROCARDIOGRAM REPORT: CPT | Performed by: INTERNAL MEDICINE

## 2021-02-14 PROCEDURE — 92526 ORAL FUNCTION THERAPY: CPT

## 2021-02-14 PROCEDURE — 82948 REAGENT STRIP/BLOOD GLUCOSE: CPT

## 2021-02-14 PROCEDURE — 99232 SBSQ HOSP IP/OBS MODERATE 35: CPT | Performed by: INTERNAL MEDICINE

## 2021-02-14 PROCEDURE — 87040 BLOOD CULTURE FOR BACTERIA: CPT | Performed by: PHYSICIAN ASSISTANT

## 2021-02-14 PROCEDURE — 3E0L3GC INTRODUCTION OF OTHER THERAPEUTIC SUBSTANCE INTO PLEURAL CAVITY, PERCUTANEOUS APPROACH: ICD-10-PCS | Performed by: THORACIC SURGERY (CARDIOTHORACIC VASCULAR SURGERY)

## 2021-02-14 PROCEDURE — 99223 1ST HOSP IP/OBS HIGH 75: CPT | Performed by: PSYCHIATRY & NEUROLOGY

## 2021-02-14 PROCEDURE — 71045 X-RAY EXAM CHEST 1 VIEW: CPT

## 2021-02-14 RX ORDER — ASPIRIN 81 MG/1
81 TABLET, CHEWABLE ORAL DAILY
Status: DISCONTINUED | OUTPATIENT
Start: 2021-02-14 | End: 2021-02-15

## 2021-02-14 RX ORDER — ALBUMIN (HUMAN) 12.5 G/50ML
25 SOLUTION INTRAVENOUS ONCE
Status: COMPLETED | OUTPATIENT
Start: 2021-02-14 | End: 2021-02-14

## 2021-02-14 RX ORDER — MAGNESIUM SULFATE HEPTAHYDRATE 40 MG/ML
2 INJECTION, SOLUTION INTRAVENOUS ONCE
Status: COMPLETED | OUTPATIENT
Start: 2021-02-14 | End: 2021-02-14

## 2021-02-14 RX ORDER — ACETAMINOPHEN 650 MG/1
650 SUPPOSITORY RECTAL EVERY 6 HOURS PRN
Status: DISCONTINUED | OUTPATIENT
Start: 2021-02-14 | End: 2021-02-22

## 2021-02-14 RX ORDER — SODIUM CHLORIDE, SODIUM GLUCONATE, SODIUM ACETATE, POTASSIUM CHLORIDE, MAGNESIUM CHLORIDE, SODIUM PHOSPHATE, DIBASIC, AND POTASSIUM PHOSPHATE .53; .5; .37; .037; .03; .012; .00082 G/100ML; G/100ML; G/100ML; G/100ML; G/100ML; G/100ML; G/100ML
500 INJECTION, SOLUTION INTRAVENOUS ONCE
Status: DISCONTINUED | OUTPATIENT
Start: 2021-02-14 | End: 2021-02-14

## 2021-02-14 RX ORDER — ACETAMINOPHEN 650 MG/1
650 SUPPOSITORY RECTAL EVERY 6 HOURS PRN
Status: DISCONTINUED | OUTPATIENT
Start: 2021-02-14 | End: 2021-02-14

## 2021-02-14 RX ORDER — SODIUM CHLORIDE, SODIUM GLUCONATE, SODIUM ACETATE, POTASSIUM CHLORIDE, MAGNESIUM CHLORIDE, SODIUM PHOSPHATE, DIBASIC, AND POTASSIUM PHOSPHATE .53; .5; .37; .037; .03; .012; .00082 G/100ML; G/100ML; G/100ML; G/100ML; G/100ML; G/100ML; G/100ML
75 INJECTION, SOLUTION INTRAVENOUS CONTINUOUS
Status: DISPENSED | OUTPATIENT
Start: 2021-02-14 | End: 2021-02-14

## 2021-02-14 RX ORDER — ATORVASTATIN CALCIUM 40 MG/1
40 TABLET, FILM COATED ORAL EVERY EVENING
Status: DISCONTINUED | OUTPATIENT
Start: 2021-02-14 | End: 2021-02-15

## 2021-02-14 RX ADMIN — ALBUMIN (HUMAN) 25 G: 0.25 INJECTION, SOLUTION INTRAVENOUS at 04:58

## 2021-02-14 RX ADMIN — ENOXAPARIN SODIUM 70 MG: 80 INJECTION SUBCUTANEOUS at 21:26

## 2021-02-14 RX ADMIN — METRONIDAZOLE 500 MG: 500 INJECTION, SOLUTION INTRAVENOUS at 06:18

## 2021-02-14 RX ADMIN — DICLOFENAC SODIUM 4 G: 10 GEL TOPICAL at 00:10

## 2021-02-14 RX ADMIN — INSULIN GLARGINE 10 UNITS: 100 INJECTION, SOLUTION SUBCUTANEOUS at 00:07

## 2021-02-14 RX ADMIN — GLYCOPYRROLATE 1 MG: 1 TABLET ORAL at 08:57

## 2021-02-14 RX ADMIN — GABAPENTIN 400 MG: 400 CAPSULE ORAL at 08:43

## 2021-02-14 RX ADMIN — MICONAZOLE NITRATE 1 APPLICATION: 20 CREAM TOPICAL at 00:10

## 2021-02-14 RX ADMIN — DICLOFENAC SODIUM 4 G: 10 GEL TOPICAL at 21:30

## 2021-02-14 RX ADMIN — MICONAZOLE NITRATE 1 APPLICATION: 20 CREAM TOPICAL at 16:36

## 2021-02-14 RX ADMIN — MENTHOL, METHYL SALICYLATE: 10; 15 CREAM TOPICAL at 21:29

## 2021-02-14 RX ADMIN — Medication 2000 UNITS: at 08:43

## 2021-02-14 RX ADMIN — ENOXAPARIN SODIUM 70 MG: 80 INJECTION SUBCUTANEOUS at 00:07

## 2021-02-14 RX ADMIN — SODIUM CHLORIDE 250 ML: 0.9 INJECTION, SOLUTION INTRAVENOUS at 21:28

## 2021-02-14 RX ADMIN — DICLOFENAC SODIUM 4 G: 10 GEL TOPICAL at 11:42

## 2021-02-14 RX ADMIN — LISINOPRIL 10 MG: 10 TABLET ORAL at 08:43

## 2021-02-14 RX ADMIN — SENNOSIDES 8.6 MG: 8.6 TABLET, FILM COATED ORAL at 08:43

## 2021-02-14 RX ADMIN — GLYCOPYRROLATE 1 MG: 1 TABLET ORAL at 21:26

## 2021-02-14 RX ADMIN — INSULIN LISPRO 1 UNITS: 100 INJECTION, SOLUTION INTRAVENOUS; SUBCUTANEOUS at 08:48

## 2021-02-14 RX ADMIN — DORNASE ALFA 5 MG: 1 SOLUTION RESPIRATORY (INHALATION) at 10:35

## 2021-02-14 RX ADMIN — POTASSIUM CHLORIDE 20 MEQ: 14.9 INJECTION, SOLUTION INTRAVENOUS at 00:07

## 2021-02-14 RX ADMIN — MICAFUNGIN SODIUM 100 MG: 50 INJECTION, POWDER, LYOPHILIZED, FOR SOLUTION INTRAVENOUS at 09:53

## 2021-02-14 RX ADMIN — INSULIN LISPRO 2 UNITS: 100 INJECTION, SOLUTION INTRAVENOUS; SUBCUTANEOUS at 16:35

## 2021-02-14 RX ADMIN — INSULIN GLARGINE 10 UNITS: 100 INJECTION, SOLUTION SUBCUTANEOUS at 21:26

## 2021-02-14 RX ADMIN — GABAPENTIN 400 MG: 400 CAPSULE ORAL at 18:18

## 2021-02-14 RX ADMIN — OXYCODONE HYDROCHLORIDE 2.5 MG: 5 TABLET ORAL at 11:56

## 2021-02-14 RX ADMIN — METHADONE HYDROCHLORIDE 70 MG: 10 TABLET ORAL at 09:09

## 2021-02-14 RX ADMIN — MICONAZOLE NITRATE 1 APPLICATION: 20 CREAM TOPICAL at 21:27

## 2021-02-14 RX ADMIN — ONDANSETRON 4 MG: 2 INJECTION INTRAMUSCULAR; INTRAVENOUS at 16:35

## 2021-02-14 RX ADMIN — ALTEPLASE 10 MG: 2.2 INJECTION, POWDER, LYOPHILIZED, FOR SOLUTION INTRAVENOUS at 10:35

## 2021-02-14 RX ADMIN — POLYETHYLENE GLYCOL 3350 17 G: 17 POWDER, FOR SOLUTION ORAL at 09:06

## 2021-02-14 RX ADMIN — VANCOMYCIN HYDROCHLORIDE 1250 MG: 10 INJECTION, POWDER, LYOPHILIZED, FOR SOLUTION INTRAVENOUS at 06:13

## 2021-02-14 RX ADMIN — ATORVASTATIN CALCIUM 40 MG: 40 TABLET, FILM COATED ORAL at 18:18

## 2021-02-14 RX ADMIN — ASPIRIN 81 MG: 81 TABLET, CHEWABLE ORAL at 08:52

## 2021-02-14 RX ADMIN — SODIUM CHLORIDE, SODIUM GLUCONATE, SODIUM ACETATE, POTASSIUM CHLORIDE, MAGNESIUM CHLORIDE, SODIUM PHOSPHATE, DIBASIC, AND POTASSIUM PHOSPHATE 75 ML/HR: .53; .5; .37; .037; .03; .012; .00082 INJECTION, SOLUTION INTRAVENOUS at 03:12

## 2021-02-14 RX ADMIN — GABAPENTIN 400 MG: 400 CAPSULE ORAL at 21:27

## 2021-02-14 RX ADMIN — GLYCOPYRROLATE 1 MG: 1 TABLET ORAL at 18:18

## 2021-02-14 RX ADMIN — MAGNESIUM SULFATE HEPTAHYDRATE 2 G: 40 INJECTION, SOLUTION INTRAVENOUS at 03:56

## 2021-02-14 RX ADMIN — INSULIN LISPRO 1 UNITS: 100 INJECTION, SOLUTION INTRAVENOUS; SUBCUTANEOUS at 11:41

## 2021-02-14 RX ADMIN — DICLOFENAC SODIUM 4 G: 10 GEL TOPICAL at 18:19

## 2021-02-14 RX ADMIN — MICONAZOLE NITRATE 1 APPLICATION: 20 CREAM TOPICAL at 09:10

## 2021-02-14 RX ADMIN — FUROSEMIDE 40 MG: 40 TABLET ORAL at 08:43

## 2021-02-14 RX ADMIN — DICLOFENAC SODIUM 4 G: 10 GEL TOPICAL at 08:57

## 2021-02-14 RX ADMIN — Medication 1 TABLET: at 08:43

## 2021-02-14 RX ADMIN — CEFEPIME HYDROCHLORIDE 2000 MG: 2 INJECTION, POWDER, FOR SOLUTION INTRAVENOUS at 05:42

## 2021-02-14 RX ADMIN — ENOXAPARIN SODIUM 70 MG: 80 INJECTION SUBCUTANEOUS at 08:48

## 2021-02-14 NOTE — ASSESSMENT & PLAN NOTE
Patient had a fever of 101 8 previous night and 100 8 after that  None since then  Otherwise asymptomatic  No evidence of pneumonia on x-ray  Pleural fluid cultures growing Candida  Patient started on anti fungal today per pulmonology  Patient again had fever of 101 last night  Was started on broad-spectrum antibiotics  Will discontinue antibiotics  Continue micafungin for now

## 2021-02-14 NOTE — ASSESSMENT & PLAN NOTE
- S/p chest tube placement on 2/10  - Pleural fluid grew Candida albicans, on anti-fungal  - Thoracic surgery following; and report patient will likely need surgery, but patient is currently not a surgical candidate

## 2021-02-14 NOTE — PROGRESS NOTES
Progress Note Diana Sofia 1960, 56 y o  male MRN: [id number]    Unit/Bed#: [facility] 717-01 Encounter: [id number]    Primary Care Provider: VERONICA Scanlon   Date and time admitted to hospital: 1/13/2021  2:28 PM        * Pneumonia due to COVID-19 virus  Assessment & Plan  · Tested positive for covid 12/31/2020  · S/p completion of covid tx  · S/p completion of IV abx for possible superimposed bacterial pna  · Considered recovered; off isolation    Fever  Assessment & Plan  Patient had a fever of 101 8 previous night and 100 8 after that  None since then  Otherwise asymptomatic  No evidence of pneumonia on x-ray  Pleural fluid cultures growing Candida  Patient started on anti fungal today per pulmonology  Patient again had fever of 101 last night  Was started on broad-spectrum antibiotics  Will discontinue antibiotics  Continue micafungin for now  Left Hydropneumothorax  Assessment & Plan  Status post chest tube placement  Currently connected to suction  Patient's respiratory status has improved  Oxygen requirements back to 2-3 L  Pulmonology and thoracic surgery following  Chest x-ray done last night shows persistent hydropneumothorax  Plan for repeat tPA and dornase by thoracic surgery team today  Ischemia of right lower extremity with suspected rhabdomyolysis  Assessment & Plan  · Suspected embolic from aortic thrombus  · 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  · Continue therapeutic Lovenox   · Currently stable from a vascular surgery standpoint    Acute respiratory failure with hypoxia ([address] )  Assessment & Plan  · Secondary to COVID 19  · Extubated 1/25  · O2 requirements worsen because of hydropneumothorax  Now improved and back to 2-3 L  Right femoral vein DVT (HCC)  Assessment & Plan  Continue Lovenox  Oropharyngeal dysphagia  Assessment & Plan  · Extubated 1/25 and required NGT for nutrition   · Speech therapy following   · S/p barium swallow  · Continue modified diet    · Continue robinul  · S/p ENT consult, no vocal cord motion impairment  Muscle weakness of right upper extremity  Assessment & Plan  · Profound weakness, predominantly in right upper extremity, especially due to critical illness and steroid use  · Overnight stroke alert was called because of concern for worsening right upper extremity weakness  CT head showed questionable hypodensity but no evidence of acute stroke or bleeding  · Patient currently on therapeutic Lovenox  · This morning patient able to move his right upper extremity and also squeeze fingers  Strength is 2 to 3/5  He is not really able to lift his right upper extremity higher  This is his baseline  Continue monitor closely for now  Neurology was consulted given the normal CT head  8  For improve  Aortic thrombus [facility])  Assessment & Plan  · With embolic event that compromised right femoral artery  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  S/p R AKA formalization 1/18   · Continue therapeutic lovenox  · F/u with vascular surgery as outpatient    Type 2 diabetes mellitus, with long-term current use of insulin [facility])  Assessment & Plan  Lab Results   Component Value Date    HGBA1C 11 6 (H) 12/09/2020       Recent Labs     02/13/21  1617 02/13/21  2041 02/14/21  0842 02/14/21  1113   POCGLU 115 132 191* 205*       Blood Sugar Average: Last 72 hrs:  (P) 151     · Continue Lantus 10 units with 3 units Humalog t i d  With meals  Continue sliding scale  Methadone maintenance therapy patient [facility])  Assessment & Plan  Chronic methadone use    HTN (hypertension)  Assessment & Plan  · Stable  · Continue lisinopril, lasix        VTE Pharmacologic Prophylaxis:   Pharmacologic: Enoxaparin (Lovenox)  Mechanical VTE Prophylaxis in Place: Yes    Patient Centered Rounds: I have performed bedside rounds with nursing staff today      Discussions with Specialists or Other Care Team Provider:     Education and Discussions with Family / Patient: pt  Updated nephew Nolan    Time Spent for Care: 30 minutes  More than 50% of total time spent on counseling and coordination of care as described above  Current Length of Stay: 32 day(s)    Current Patient Status: Inpatient   Certification Statement: The patient will continue to require additional inpatient hospital stay due to above    Discharge Plan:     Code Status: Level 1 - Full Code      Subjective:   Pt seen and examined by me this morning  Pt denies any new complaints this morning  Says he still has intermittent phantom limb pain in right lower extremity  Objective:     Vitals:   Temp (24hrs), Av 7 °F (37 6 °C), Min:98 5 °F (36 9 °C), Max:101 1 °F (38 4 °C)    Temp:  [98 5 °F (36 9 °C)-101 1 °F (38 4 °C)] 101 1 °F (38 4 °C)  HR:  [] 105  Resp:  [16-18] 16  BP: ()/(50-89) 126/58  SpO2:  [90 %-99 %] 96 %  Body mass index is 22 7 kg/m²  Input and Output Summary (last 24 hours): Intake/Output Summary (Last 24 hours) at 2021 1314  Last data filed at 2021 1149  Gross per 24 hour   Intake 300 ml   Output 960 ml   Net -660 ml       Physical Exam:     Physical Exam    Constitutional: Pt appears comfortable  Not in any acute distress  Cardiovascular: Normal rate, regular rhythm, normal heart sounds  No murmur heard  Pulmonary/Chest: Effort normal, air entry decreased on right  No respiratory distress  Pt has no wheezes or crackles  Abdominal: Soft  Non-distended, Non-tender  Bowel sounds are normal    Neurological: awake, alert  RUE strength 2-3/5  Psychiatric: normal mood and affect       Additional Data:     Labs:    Results from last 7 days   Lab Units 21   WBC Thousand/uL 15 91*   HEMOGLOBIN g/dL 10 3*   HEMATOCRIT % 34 7*   PLATELETS Thousands/uL 368     Results from last 7 days   Lab Units 21   SODIUM mmol/L 139   POTASSIUM mmol/L 3 3*   CHLORIDE mmol/L 101   CO2 mmol/L 37*   BUN mg/dL 11   CREATININE mg/dL 0 50*   ANION GAP mmol/L 1*   CALCIUM mg/dL 8 9 GLUCOSE RANDOM mg/dL 116         Results from last 7 days   Lab Units 02/14/21  1113 02/14/21  0842 02/13/21  2041 02/13/21  1617 02/13/21  1101 02/13/21  0704 02/12/21  2347 02/12/21  1552 02/12/21  1111 02/12/21  0932 02/12/21  0558 02/11/21  2208   POC GLUCOSE mg/dl 205* 191* 132 115 146* 122 192* 119 161* 175* 223* 166*         Results from last 7 days   Lab Units 02/13/21  2036 02/11/21  0536 02/10/21  0642   PROCALCITONIN ng/ml 0 30* 0 27* 0 31*           * I Have Reviewed All Lab Data Listed Above  * Additional Pertinent Lab Tests Reviewed: Jaime 64 Admission Reviewed    Imaging:    Imaging Reports Reviewed Today Include:   Imaging Personally Reviewed by Myself Includes:     Recent Cultures (last 7 days):     Results from last 7 days   Lab Units 02/14/21  0218 02/14/21  0217 02/10/21  1253   BLOOD CULTURE  Received in Microbiology Lab  Culture in Progress  Received in Microbiology Lab  Culture in Progress    --    GRAM STAIN RESULT   --   --  1+ Polys  No organisms seen   BODY FLUID CULTURE, STERILE   --   --  Few Colonies of Candida albicans*       Last 24 Hours Medication List:   Current Facility-Administered Medications   Medication Dose Route Frequency Provider Last Rate    acetaminophen  650 mg Rectal Q6H PRN Andrea Apple MD      acetaminophen  650 mg Oral Q6H PRN VERONICA Horton      albuterol  2 puff Inhalation Q4H PRN Reid Suarez DO      aspirin  81 mg Oral Daily Toby Beyer PA-C      atorvastatin  40 mg Oral QPM Toby Beyer PA-C      bisacodyl  10 mg Rectal Daily PRN VERONICA Horton      cholecalciferol  2,000 Units Oral Daily VERONICA Kennedy      Diclofenac Sodium  4 g Topical 4x Daily VERONICA Kennedy      enoxaparin  1 mg/kg Subcutaneous Q12H [address] Andrea Apple MD      furosemide  40 mg Oral Daily Reid Suarez DO      gabapentin  400 mg Oral TID VERONICA Horton      glycopyrrolate  1 mg Oral TID Shannan A Sandeep, VERONICA      insulin glargine  10 Units Subcutaneous HS Jaja Stewart MD      insulin lispro  1-5 Units Subcutaneous TID AC Jaja Stewart MD      insulin lispro  3 Units Subcutaneous TID With Meals Jaja Stewart MD      Lidocaine Viscous HCl  15 mL Swish & Spit 4x Daily PRN Shannan A Rayoora, CRNP      lisinopril  10 mg Oral Daily Shannan A Sandeep, CRNP      menthol-methyl salicylate   Apply externally 4x Daily PRN VERONICA Villanueva      methadone  70 mg Oral Daily Peg Golas, DO      micafungin (MYCAMINE) 100 mL IVPB  100 mg Intravenous Daily Sussy DO Merari 100 mg (02/14/21 0953)   Angel Pilseverino SAMREEN ANTIFUNGAL  1 application Topical TID Sonia Mccall DO      multivitamin-minerals  1 tablet Oral Daily Shannan A Rayoora, CRNP      omeprazole (PRILOSEC) suspension 2 mg/mL  20 mg Oral Early Morning Shannan Hopkins, VERONICA      ondansetron  4 mg Intravenous Q6H PRN Toby Beyer PA-C      oxyCODONE  2 5 mg Oral Q6H PRN Jes Martinez MD      phenol  1 spray Mouth/Throat Q2H PRN Shannan A Sandeep, VERONICA      polyethylene glycol  17 g Oral Daily Shannan A Rayoora, VERONICA      senna  1 tablet Oral BID Shannan A Sandeep, CRNP      sodium chloride  1 spray Each Nare Q1H PRN Daniella Sarmiento DO          Today, Patient Was Seen By: Jes Martinez MD    ** Please Note: Dictation voice to text software may have been used in the creation of this document   **

## 2021-02-14 NOTE — CONSULTS
Mary Connelly is a 56 y o  male who was receiving Vancomycin IV with management by the Pharmacy Consult service for treatment of Pneumonia septic shock  The patient's Vancomycin therapy has been completed / discontinued  Thank you for allowing us to take part in this patient's care  Pharmacy will sign-off now; please call or re-consult if there are any questions  Pedro Hill, PharmD   [address] [facility]

## 2021-02-14 NOTE — NURSING NOTE
Patient lying in bed with c/o 7 out of 10 pain in this R Leg (Right AKA), patient given 2 5 of Oxycodone for pain, vitals are stable, lungs are clear but diminished  R AKA washed and cleansed with Betadine as well as R Groin incision, Clean, dry, and old drainage also cleaned with Betadine  Patient repositioned and L heel Allevyn replaced with a new dressing  IV's flushed and infusing and Saline locked  Will continue to monitor    Kandace Diaz RN

## 2021-02-14 NOTE — PROGRESS NOTES
Pastoral Care Progress Note    2021  Patient: Vernon Louis : 1960  Admission Date & Time: 2021 1428  MRN: [id number] [facility]: [id number]                     Chaplaincy Interventions Utilized:   Empowerment: Encouraged self-care    Relationship Building: Cultivated a relationship of care and support    Ritual: Provided prayer     responded to Stroke Alert Page  Met the patient  Provided psychosocial and spiritual support to the patient  The Pastoral Care Department will continue to follow the patient as necessary or as requested by the patient       21 0800   Spiritual Beliefs/Perceptions   Concept of God Accepting

## 2021-02-14 NOTE — PROGRESS NOTES
Patient complaining of non radiating left side chest pain  HR 120s  BP stable  SpO2 low 80s on 2L, increased to 5L and maintaining > 90%  No increased WOB  Then had an episode of emesis  CP resolved without intervention  EKG no ischemic changes  Troponin WNL  CXR: No significant change in moderate left pneumothorax  Left basilar pleural pigtail catheter is present  There is now subcutaneous emphysema in the left lateral chest wall  Clinical correlation and follow-up is recommended  - TT to thoracic on call  Will monitor  On exam RUE flaccid  Prior documentation R  weaker than L , however consistently charted RUE strength 3/5  Rest of neuro exam around baseline  Stroke alert called  CTH No acute intracranial hemorrhage  Questionable subtle hypodensity superior aspect left cerebral peduncle  CTH and CTA H/N reviewed by neurology, appreciated  Plan to place on stroke pathway overnight  Continue therapeutic Lovenox  Upgrade level of care to L2SD  Monitor respiratory status closely  CXR without new infiltrate  Procalcitonin at baseline  WBC increase to 15 91, repeat CBC in AM  Continue Micafungin for candida growth in pleural fluid  Consider initiating antibiotics if clinically worsening  Will update family this morning  Continue to monitor and notify of any change  Addendum: rectal temp 101 1  Tylenol and IVF  Start broad spectrum cefepime, vanc, flagyl  Called belkys Michelle for update this morning  21

## 2021-02-14 NOTE — SPEECH THERAPY NOTE
Speech Language/Pathology    Speech/Language Pathology Progress Note    Patient Name: Codi Lockwood  FQDRW'H Date: 2/14/2021         Subjective:  New orders rec'd for speech/swallow eval, pt made NPO due to possible stroke alert 2/13 pm, pt noted to have increased RUE weakness  CT-head 2/13 showed questionable subtle hypodensity superior aspect of L cerebral peduncle  Objective:  Pt now ordered a puree diet w/ NTL, seen at lunch  Pt w/ weak voice, engaged in basic conversation, asking for "real water"  Pt ate 75% of pureed lunch, trialed w/ pcs of jelly bread, NTL by cup and straw, thin liquids by cup, also ice chips  Pt w/ functional appearing mastication/manipulation of bread  No overt s/s aspiration w/ NTL by cup and straw  Pt instructed on small, single cup sips of thin liquids w/ chin tuck  Pt was able to follow strategies for first 2 sips, then took a larger sip and throat clearing was noted  Pt admitted he "took too much"  Pt took a few more small sips w/ strategy, then started taking successive sips from cup, cup was taken away from pt  Delayed cough noted  Pt trialed w/ ice chips x3  Delayed cough again noted on 3rd ice chip  Discussed w/ nsg, pt can a few ice chips w/ nsg supervision only- 1 at a time  Assessment:  Oral and pharyngeal stages of swallowing appear back to baseline prior to stroke alert       Plan/Recommendations:  Pt appears appropriate to advance back to dysphagia 2 diet w/ nectar thick liquids  Cont meds crushed in puree  Speech to work w/ pt on using small, single cup sips w/ chin tuck   Ok for ice chips w/ nsg supervision      Wen Tanner, [address] [place] CCC-SLP  Speech Pathogist  Available via  tiger text

## 2021-02-14 NOTE — PROGRESS NOTES
Vancomycin Assessment    Ciro Otero is a 56 y o  male who is currently receiving vancomycin 1250mg iv q12 for Pneumonia septic shock   Relevant clinical data and objective history reviewed:  Creatinine   Date Value Ref Range Status   02/13/2021 0 50 (L) 0 60 - 1 30 mg/dL Final     Comment:     Standardized to IDMS reference method   02/11/2021 0 40 (L) 0 60 - 1 30 mg/dL Final     Comment:     Standardized to IDMS reference method   02/10/2021 0 46 (L) 0 60 - 1 30 mg/dL Final     Comment:     Standardized to IDMS reference method     Vancomycin Rm   Date Value Ref Range Status   01/24/2021 22 6 ug/mL Final     BP 98/53 (BP Location: Left arm)   Pulse (!) 109   Temp (!) 101 1 °F (38 4 °C) (Rectal)   Resp 18   Ht 5' 6" (1 676 m)   Wt 63 5 kg (140 lb)   SpO2 99%   BMI 22 60 kg/m²   I/O last 3 completed shifts: In: 240 [P O :240]  Out: 1275 [Urine:1025; Chest Tube:250]  Lab Results   Component Value Date/Time    BUN 11 02/13/2021 08:36 PM    WBC 15 91 (H) 02/13/2021 08:36 PM    HGB 10 3 (L) 02/13/2021 08:36 PM    HCT 34 7 (L) 02/13/2021 08:36 PM    MCV 94 02/13/2021 08:36 PM     02/13/2021 08:36 PM     Temp Readings from Last 3 Encounters:   02/14/21 (!) 101 1 °F (38 4 °C) (Rectal)   01/13/21 99 3 °F (37 4 °C) (Axillary)   01/11/21 (!) 95 4 °F (35 2 °C) (Tympanic)     Vancomycin Days of Therapy: 1    Assessment/Plan  The patient is currently on vancomycin utilizing scheduled dosing based on actual body weight  Baseline risks associated with therapy include: concomitant nephrotoxic medications, advanced age, and dehydration  The patient is currently receiving 1250mg iv q12 and is clinically appropriate and dose will be continued  Pharmacy will also follow closely for s/sx of nephrotoxicity, infusion reactions, and appropriateness of therapy  BMP and CBC will be ordered per protocol  Plan for trough as patient approaches steady state, prior to the 4th  dose at approximately 1500 02/15/2021    Due to infection severity, will target a trough of 15-20 (appropriate for most indications)   Pharmacy will continue to follow the patients culture results and clinical progress daily      Siomara Contreras, Pharmacist

## 2021-02-14 NOTE — ASSESSMENT & PLAN NOTE
Status post chest tube placement  Currently connected to suction  Patient's respiratory status has improved  Oxygen requirements back to 2-3 L  Pulmonology and thoracic surgery following  Chest x-ray done last night shows persistent hydropneumothorax  Plan for repeat tPA and dornase by thoracic surgery team today

## 2021-02-14 NOTE — PROGRESS NOTES
Progress Note - Thoracic Surgery   Berenice Lynn 56 y o  male MRN: [id number]  Unit/Bed#: [facility] 717-01 Encounter: [id number]    Assessment:  60M w multiple medical comorbidities including PAD and acute RLE ischemia s/p recent R AKA, aortic thrombus currently on therapeutic anticoagulation, IVDU on methadone, recent COVID PNA w prolonged intubation, who p/w L hydroptx ex vacuo  - overnight stroke alert called for RUE weakness, CTH unremarkable  - febrile and WBC elevated, started on cef/vanc/flagyl  - desatted yesterday some time after CT connected back to suction, was actively vomiting and likely had aspiration event, currently stable on 5LNC  - L CT to suction, -AL, 250 output serous to serosang    Plan:  Continue micafungin  Fever and leukocytosis likely secondary to SIRS response from TPA  L CT to suction  Will repeat imaging at some point, no surgical intervention currently anticipated  Wean O2 as tolerated  Therapeutic lovenox  Respiratory protocol, aspiration precautions      Subjective/Objective     Subjective:   - overnight stroke alert called for RUE weakness, CTH unremarkable  - febrile and WBC elevated, started on cef/vanc/flagyl  - desatted yesterday some time after CT connected back to suction, was actively vomiting and likely had aspiration event, currently stable on 5LNC  - work of breathing improved this morning, patient comfortable with minimal L sided chest pain at tube insertion site    Objective:     Blood pressure 126/58, pulse 105, temperature (!) 101 1 °F (38 4 °C), temperature source Rectal, resp  rate 16, height 5' 6" (1 676 m), weight 63 8 kg (140 lb 10 5 oz), SpO2 96 %  ,Body mass index is 22 7 kg/m²        Intake/Output Summary (Last 24 hours) at 2/14/2021 0644  Last data filed at 2/13/2021 1924  Gross per 24 hour   Intake 60 ml   Output 650 ml   Net -590 ml       Invasive Devices     Peripheral Intravenous Line            Peripheral IV 02/13/21 Right Wrist less than 1 day    Peripheral IV 02/14/21 Right Antecubital less than 1 day          Drain            Chest Tube 1 Left Midaxillary;Pleural 10 Fr  3 days                Physical Exam:     GEN: NAD  HEENT: MMM  Chest: L CT to suction with no output, -AL  CV: RRR  Lung: normal effort  Ab: Soft, NT/ND  Extrem: No CCE  Neuro:  A+Ox3, motor and sensation grossly intact    Lab, Imaging and other studies:  CBC:   Lab Results   Component Value Date    WBC 15 91 (H) 02/13/2021    HGB 10 3 (L) 02/13/2021    HCT 34 7 (L) 02/13/2021    MCV 94 02/13/2021     02/13/2021    MCH 27 8 02/13/2021    MCHC 29 7 (L) 02/13/2021    RDW 16 0 (H) 02/13/2021    MPV 9 8 02/13/2021   , CMP:   Lab Results   Component Value Date    SODIUM 139 02/13/2021    K 3 3 (L) 02/13/2021     02/13/2021    CO2 37 (H) 02/13/2021    BUN 11 02/13/2021    CREATININE 0 50 (L) 02/13/2021    CALCIUM 8 9 02/13/2021    EGFR 116 02/13/2021   , Coagulation: No results found for: PT, INR, APTT, Urinalysis: No results found for: COLORU, CLARITYU, SPECGRAV, PHUR, LEUKOCYTESUR, NITRITE, PROTEINUA, GLUCOSEU, KETONESU, BILIRUBINUR, BLOODU  VTE Pharmacologic Prophylaxis: Enoxaparin (Lovenox)  VTE Mechanical Prophylaxis: sequential compression device

## 2021-02-14 NOTE — ASSESSMENT & PLAN NOTE
- On therapeutic Lovenox  - Underwent R AKA due to RLE ischemia thought to be secondary to aortic thrombus

## 2021-02-14 NOTE — ASSESSMENT & PLAN NOTE
3 56year-old with multiple vascular risk factors, Hepatitis C, history of IVDU on methadone who initially presented to  [facility] on 1/11 and was admitted for DKA  Patient's hospitalization was prolonged and complicated by worsening respiratory status s/p intubation (extubated on 1/25), RLE secondary to aortic thrombus (on therapeutic Lovenox) s/p RLE AKA on 1/14, and left pneumothorax s/p chest tube placement  Neurology was consulted on 2/13 for a stroke alert for worsening RUE weakness (was 3/5 strength, but at the time of alert was reportedly 0/5)  NIHSS 4  Patient was not a tPA candidate due to being on anticoagulation  CT head showed no hemorrhage, but showed subtle hypodensity in L cerebral peduncle  CTA head and neck showed no large vessel occlusion, but showed poorly visualized R transverse sinus  Patient's day shift nurse reported that patient has been this way previously and there is no change in his strength       Plan:  - Continue therapeutic Lovenox per primary team  - Will defer MRI brain at this time (patient's day shift nurse reports that patient's strength has been this way previously and there was no change in his strength)  - Discontinue ASA 81mg  - Continue home statin (appears to be on pravastatin 40mg at baseline)  - Reviewed HgbA1c results from December 2020 (11 6)  - Reviewed echocardiogram results from 1/21/2021  - Neuro checks  - Notify Neurology with any changes in neuro examination  - Medical management and supportive care as per primary team

## 2021-02-14 NOTE — CONSULTS
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, and left pneumothorax s/p chest tube placement  Neurology was consulted on 2/13 for a stroke alert for worsening RUE weakness (was 3/5 strength, but at the time of alert was reportedly 0/5)  NIHSS 4  Patient was not a tPA candidate due to being on anticoagulation  CT head showed no hemorrhage, but showed subtle hypodensity in L cerebral peduncle  CTA head and neck showed no large vessel occlusion, but showed poorly visualized R transverse sinus  Patient's day shift nurse reported that patient has been this way previously and there is no change in his strength       Plan:  - Continue therapeutic Lovenox per primary team  - Will defer MRI brain at this time (patient's day shift nurse reports that patient's strength has been this way previously and there was no change in his strength)  - Discontinue ASA 81mg  - Continue home statin (appears to be on pravastatin 40mg at baseline)  - Reviewed HgbA1c results from December 2020 (11 6)  - Reviewed echocardiogram results from 1/21/2021  - Neuro checks  - Notify Neurology with any changes in neuro examination  - Medical management and supportive care as per primary team    Left Hydropneumothorax  Assessment & Plan  - S/p chest tube placement on 2/10  - Pleural fluid grew Candida albicans, on anti-fungal  - Thoracic surgery following; and report patient will likely need surgery, but patient is currently not a surgical candidate    Aortic thrombus [facility])  Assessment & Plan  - On therapeutic Lovenox  - Underwent R AKA due to RLE ischemia thought to be secondary to aortic thrombus    Acute respiratory failure with hypoxia ([facility] [address] )  Assessment & Plan  - Patient was extubated on 1/25    Type 2 diabetes mellitus, with long-term current use of insulin ([facility] [address] )  Assessment & Plan  - HgbA1c 11 6 in December 2020  - Management per primary team    Methadone maintenance therapy patient [facility])  Assessment & Plan  - History of IVDU  - On Methadone    * Pneumonia due to COVID-19 virus  Assessment & Plan  - Prior COVID infection, diagnosed 12/31/2020    Results:  1  CT head: No acute intracranial hemorrhage  Questionable subtle hypodensity superior aspect left cerebral peduncle  MRI with diffusion-weighted imaging is recommended for further evaluation, if there are no contraindications  2  CTA head and neck: No large vessel flow restrictive disease within the head or neck  No evidence of high-grade proximal stenosis of the visualized [place]  No evidence of acute arterial dissection  Portions of the right transverse sinus are not well visualized, however, the left-sided transverse sinus is prominent and appears dominant  If there is clinical concern for acute pathology involving the right transverse sinus, MRV could be performed for further evaluation, if there are no contraindications  There is subcutaneous emphysema in the left chest wall and tracking into the soft tissues of the posterior left neck  This is new compared with a CT of the chest performed February 13, 2021 at 2:57 AM   3  Echocardiogram (1/21/2021): EF 70%  There were no regional wall motion abnormalities  Recommendations for outpatient neurological follow up have yet to be determined      History of Present Illness     Reason for Consult / Principal Problem: CVA  Hx and PE limited by: N/A  HPI: Debo Eddy is a 56 y o  male with DM, HTN, HLD, Hepatitis C, recent COVID infection (dignosed on 12/31), history of IV drug use on methadone who initially presented to [facility]  On 1/11 and was admitted for DKA and was transferred to [facility]  Patient had worsening respiratory status and was intubated on 1/12  Patient was also experiencing hematemesis and hematuria  Patient's hospitalization was complicated by RLE ischemia suspected to be secondary to emboli from aortic thrombus (R leg was pale and pulseless after intubation)  Patient was transferred to [facility] on 1/13 for evaluation and potential surgical intervention  On 1/14 patient underwent RLE AKA  Patient was started on therapeutic Lovenox for aortic thrombus  Patient was extubated on 1/25  Patient was found to have left pneumothorax during admission and underwent chest tube placement on 2/10  Pleural cultures grew out Candida albicans  Patient was evaluated by Thoracic Surgery and it was thought that his left pleural space issues will likely not resolve without surgery, but patient is a poor surgical candidate at this time  Patient was given a dose of tPA, placed on antifungal, and chest tube was kept with suction  Neurology was consulted on 2/13 for stroke alert called for RUE weakness  It was reported at baseline his R arm had 3/5 strength, but at the time the stroke alert was called it was 0/5  NIHSS of 4  Patient was not a tPA candidate due to patient being on anticoagulation (therapeutic Lovenox) for aortic thrombus  CT head was negative for hemorrhage, but showed questionable subtle hypodensity superior aspect of the left cerebral peduncle  CTA head and neck showed no large vessel occlusion, but portions of the R transverse sinus were not well visualized  Patient was febrile with a rectal temperature of 101 1 and he was started on IV antibiotics, given Tylenol, and started on IV fluids  Patient states that he has been having R arm weakness since he was initially admitted in January 2021  He states that his "left arm" is fine   He reports that his left leg has also been weak since he was admitted  He states that he is unable to lift his left leg  He reports some pain in his R leg  He reports no changes in speech  He reports that it is "hard to say" if he has noticed any vision changes  He reports no current headache, but states that he did have a headache the past few days  Spoke to patient's day shift nurse who reports a misunderstanding and states that patient has been this way previously and reports no change in his strength  Inpatient consult to Neurology  Consult performed by: Joann Jimenez PA-C  Consult ordered by: Monique Beyer PA-C        ROS:  A 12 point ROS was completed  Other than the above mentioned complaints in the HPI, all remaining systems were negative  Historical Information   Past Medical History:   Diagnosis Date    Diabetes mellitus ([facility] [address] )     GERD (gastroesophageal reflux disease)     Hypercholesteremia     Hypertension     Methadone use ([facility] )      Past Surgical History:   Procedure Laterality Date    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    Procedure: AMPUTATION ABOVE KNEE (AKA);   Surgeon: Jacqueline Huerta MD;  [facility]: [facility];  Service: Vascular    AMPUTATION ABOVE KNEE (AKA) Right 1/18/2021    Procedure: AMPUTATION ABOVE KNEE (AKA) FORMALIZATION,  R AKA wound washout, wound closure;  Surgeon: Jacqueline Huerta MD;  [facility]: [facility];  Service: Vascular    ARTERIOGRAM Right 1/13/2021    Procedure: ARTERIOGRAM;  Surgeon: David Davis DO;  [facility];  Service: Vascular    IR CHEST TUBE PLACEMENT  2/10/2021    IR LOWER EXTREMITY ANGIOGRAM  1/14/2021    THROMBECTOMY W/ EMBOLECTOMY Right 1/13/2021    Procedure: EMBOLECTOMY/THROMBECTOMY LOWER EXTREMITY;  Surgeon: David Davis DO;  [facility]: [facility];  Service: Vascular     Social History   Social History     Substance and Sexual Activity   Alcohol Use No     Social History     Substance and Sexual Activity   Drug Use No Comment: methadone     E-Cigarette/Vaping    E-Cigarette Use Never User      E-Cigarette/Vaping Substances    Nicotine No     THC No     CBD No     Flavoring No     Other No     Unknown No      Social History     Tobacco Use   Smoking Status Former Smoker   Smokeless Tobacco Never Used     Family History:   Family History   Problem Relation Age of Onset    Diabetes Mother     Hypertension Father     Leukemia Father     Acute lymphoblastic leukemia Father     Cancer Sister      Review of previous medical records was completed       Meds/Allergies   current meds:   Current Facility-Administered Medications   Medication Dose Route Frequency    acetaminophen (TYLENOL) rectal suppository 650 mg  650 mg Rectal Q6H PRN    acetaminophen (TYLENOL) tablet 650 mg  650 mg Oral Q6H PRN    albuterol (PROVENTIL HFA,VENTOLIN HFA) inhaler 2 puff  2 puff Inhalation Q4H PRN    aspirin chewable tablet 81 mg  81 mg Oral Daily    atorvastatin (LIPITOR) tablet 40 mg  40 mg Oral QPM    bisacodyl (DULCOLAX) rectal suppository 10 mg  10 mg Rectal Daily PRN    cefepime (MAXIPIME) 2,000 mg in dextrose 5 % 50 mL IVPB  2,000 mg Intravenous Q12H    cholecalciferol (VITAMIN D3) tablet 2,000 Units  2,000 Units Oral Daily    Diclofenac Sodium (VOLTAREN) 1 % topical gel 4 g  4 g Topical 4x Daily    enoxaparin (LOVENOX) subcutaneous injection 70 mg  1 mg/kg Subcutaneous Q12H LIZETT    furosemide (LASIX) tablet 40 mg  40 mg Oral Daily    gabapentin (NEURONTIN) capsule 400 mg  400 mg Oral TID    glycopyrrolate (ROBINUL) tablet 1 mg  1 mg Oral TID    insulin glargine (LANTUS) subcutaneous injection 10 Units 0 1 mL  10 Units Subcutaneous HS    insulin lispro (HumaLOG) 100 units/mL subcutaneous injection 1-5 Units  1-5 Units Subcutaneous TID AC    insulin lispro (HumaLOG) 100 units/mL subcutaneous injection 3 Units  3 Units Subcutaneous TID With Meals    Lidocaine Viscous HCl (XYLOCAINE) 2 % mucosal solution 15 mL  15 mL Swish & Spit 4x Daily PRN    lisinopril (ZESTRIL) tablet 10 mg  10 mg Oral Daily    menthol-methyl salicylate (BENGAY) 87-91 % cream   Apply externally 4x Daily PRN    methadone (DOLOPHINE) tablet 70 mg  70 mg Oral Daily    metroNIDAZOLE (FLAGYL) IVPB (premix) 500 mg 100 mL  500 mg Intravenous Q8H    micafungin (MYCAMINE) 100 mg in sodium chloride 0 9 % 100 mL IVPB  100 mg Intravenous Daily    moisture barrier miconazole 2% cream (aka SAMREEN MOISTURE BARRIER ANTIFUNGAL CREAM)  1 application Topical TID    multi-electrolyte (PLASMALYTE-A/ISOLYTE-S PH 7 4) IV solution  75 mL/hr Intravenous Continuous    multivitamin-minerals (CENTRUM ADULTS) tablet 1 tablet  1 tablet Oral Daily    omeprazole (PRILOSEC) suspension 2 mg/mL  20 mg Oral Early Morning    ondansetron (ZOFRAN) injection 4 mg  4 mg Intravenous Q6H PRN    oxyCODONE (ROXICODONE) IR tablet 2 5 mg  2 5 mg Oral Q6H PRN    phenol (CHLORASEPTIC) 1 4 % mucosal liquid 1 spray  1 spray Mouth/Throat Q2H PRN    polyethylene glycol (MIRALAX) packet 17 g  17 g Oral Daily    senna (SENOKOT) tablet 8 6 mg  1 tablet Oral BID    sodium chloride (OCEAN) 0 65 % nasal spray 1 spray  1 spray Each Nare Q1H PRN    vancomycin (VANCOCIN) 1,250 mg in sodium chloride 0 9 % 250 mL IVPB  20 mg/kg Intravenous Q12H    and PTA meds:   Prior to Admission Medications   Prescriptions Last Dose Informant Patient Reported? Taking?    Blood Glucose Monitoring Suppl [place] Mendoza) w/Device KIT Past Month at Unknown time Self No Yes   Sig: Use to test blood sugars 3 times daily   Empagliflozin (Jardiance) 25 MG TABS Past Month at Unknown time  No Yes   Sig: Take 1 tablet (25 mg total) by mouth every morning   Insulin Pen Needle 31G X 5 MM MISC Past Month at Unknown time Self No Yes   Sig: by Does not apply route daily Pt to inject 4 X daily   Insulin Pen Needle 31G X 5 MM MISC Past Month at Unknown time Self No Yes   Si units sq 2X daily   ergocalciferol (VITAMIN D2) 50,000 units 2021 at Unknown time  No Yes   Sig: Take 1 capsule (50,000 Units total) by mouth once a week   erythromycin (ILOTYCIN) ophthalmic ointment Past Month at Unknown time Self Yes Yes   Si 5 inches daily at bedtime   furosemide (LASIX) 40 mg tablet 2021 at Unknown time Self No Yes   Sig: Take 1 tablet (40 mg total) by mouth daily   Patient taking differently: Take 40 mg by mouth as needed (on off work days)    gabapentin (NEURONTIN) 100 mg capsule 2021 at Unknown time  No Yes   Sig: Take 1 capsule (100 mg total) by mouth 3 (three) times a day   glucose blood (OneTouch Verio) test strip Past Month at Unknown time  No Yes   Sig: Use to test blood sugars 3 times daily   insulin NPH-insulin regular (NovoLIN 70/30) 100 units/mL subcutaneous injection   No No   Sig: Inject 40 Units under the skin 2 (two) times a day before meals   Patient taking differently: Inject 40 Units under the skin 2 (two) times a day before meals Pt states he takes it "once in awhile"   insulin glargine (LANTUS) 100 units/mL subcutaneous injection Past Week at Unknown time  No Yes   Sig: Inject 10 Units under the skin daily at bedtime   Patient taking differently: Inject 20 Units under the skin daily at bedtime    isosorbide mononitrate (IMDUR) 120 mg 24 hr tablet 2021 at Unknown time  No Yes   Sig: Take 1 tablet (120 mg total) by mouth daily   lisinopril-hydrochlorothiazide (PRINZIDE,ZESTORETIC) 20-25 MG per tablet 2021 at Unknown time  No Yes   Sig: Take 1 tablet by mouth daily   methadone (DOLOPHINE) 10 MG/5ML solution 2021 at Unknown time Self Yes Yes   Sig: Take 70 mg by mouth   omeprazole (PriLOSEC) 20 mg delayed release capsule 2021 at Unknown time  No Yes   Sig: Take 1 capsule (20 mg total) by mouth daily   ondansetron (ZOFRAN-ODT) 4 mg disintegrating tablet Past Month at Unknown time  No Yes   Sig: Take 1 tablet (4 mg total) by mouth every 6 (six) hours as needed for nausea or vomiting   potassium chloride (K-DUR,KLOR-CON) 20 mEq tablet Past Week at Unknown time Self No Yes   Sig: Take 2 tablets (40 mEq total) by mouth daily   pravastatin (PRAVACHOL) 40 mg tablet 2/12/2021 at Unknown time  No Yes   Sig: Take 1 tablet (40 mg total) by mouth daily   promethazine (PHENERGAN) 25 mg tablet Past Month at Unknown time  No Yes   Sig: TAKE 1 TABLET TWICE A DAY   sitaGLIPtin-metFORMIN (JANUMET)  MG per tablet Past Month at Unknown time  No Yes   Sig: Take 1 tablet by mouth 2 (two) times a day with meals      Facility-Administered Medications: None       Allergies   Allergen Reactions    Varenicline        Objective   Vitals:Blood pressure 126/58, pulse 105, temperature (!) 101 1 °F (38 4 °C), temperature source Rectal, resp  rate 16, height 5' 6" (1 676 m), weight 63 8 kg (140 lb 10 5 oz), SpO2 96 %  ,Body mass index is 22 7 kg/m²  Intake/Output Summary (Last 24 hours) at 2/14/2021 0759  Last data filed at 2/14/2021 0733  Gross per 24 hour   Intake 60 ml   Output 960 ml   Net -900 ml       Invasive Devices: Invasive Devices     Peripheral Intravenous Line            Peripheral IV 02/13/21 Right Wrist less than 1 day    Peripheral IV 02/14/21 Right Antecubital less than 1 day          Drain            Chest Tube 1 Left Midaxillary;Pleural 10 Fr  3 days              Physical Exam  Constitutional:       General: He is not in acute distress  Appearance: He is ill-appearing  He is not diaphoretic  Comments: Male resting in bed   HENT:      Head: Normocephalic and atraumatic  Right Ear: External ear normal       Left Ear: External ear normal       Mouth/Throat:      Mouth: Mucous membranes are dry  Eyes:      General:         Right eye: No discharge  Left eye: No discharge  Extraocular Movements: EOM normal    Pulmonary:      Effort: No respiratory distress  Comments: NC in place  Musculoskeletal:      Comments: R AKA   Skin:     General: Skin is dry         Neurologic Exam Mental Status   Patient was alert and oriented to person, place (Informous), month, and year  Patient was able to name ID, gloves, and button  Patient was able to name how many quarters are in $1 75  At times, patient would need repeated questioning for him to answer  Responses were delayed at times     Cranial Nerves     CN III, IV, VI   Extraocular motions are normal    Conjugate gaze: present    CN VII   Facial expression full, symmetric  CN VIII   Hearing: intact    CN XII   CN XII normal    Able to count fingers in all four quadrants     Motor Exam   L biceps/triceps/: 4-/5  Patient was unable to hold RUE against gravity (dropped to bed)  R biceps/triceps strength: 3/5  R  strength: 3/5  R wrist flexion: 3/5  R wrist extension: 2/5  Patient was unable to abduct his R fingers    Good R hip flexion strength   Patient was unable to lift LLE against gravity  L knee extension: 4/5  L knee flexion: 3-/5  L plantarflexion: 4/5  Unable to dorsiflex left foot     Sensory Exam   Light touch normal      Gait, Coordination, and Reflexes     Tremor   Resting tremor: absent    Lab Results: I have personally reviewed pertinent reports  Imaging Studies: I have personally reviewed pertinent reports  and I have personally reviewed pertinent films in PACS  EKG, Pathology, and Other Studies: I have personally reviewed pertinent reports      VTE Prophylaxis: Enoxaparin (Lovenox)    Code Status: Level 1 - Full Code  Advance Directive and Living Will:      Power of :    POLST:

## 2021-02-14 NOTE — QUICK NOTE
Left  chest tube was instilled with 10 mg of TPA  in 30 mL of sterile water and 5 mg of Dornase in sterile water  The tube was then clamped at 1035 and will remain clamped until 1435   The nurse will unclamp then and keep chest tube to -20 suction

## 2021-02-14 NOTE — QUICK NOTE
Called by operators regarding stroke alert at 21:43, neuro response immediate  55 yo man with worsening of RUExt weakness  Previously 3/5  Currently 0/5    NIHSS 4    Pt on therapeutic lovenox 2/2 aortic thrombus    - recent R femoral DVT    Left hydropneumothorax    CTH & CTA were unremarkable    No IV tpa 2/2 current AC  Continue AC

## 2021-02-14 NOTE — ASSESSMENT & PLAN NOTE
Lab Results   Component Value Date    HGBA1C 11 6 (H) 12/09/2020       Recent Labs     02/13/21  1617 02/13/21  2041 02/14/21  0842 02/14/21  1113   POCGLU 115 132 191* 205*       Blood Sugar Average: Last 72 hrs:  (P) 151     · Continue Lantus 10 units with 3 units Humalog t i d  With meals  Continue sliding scale

## 2021-02-14 NOTE — ASSESSMENT & PLAN NOTE
· Profound weakness, predominantly in right upper extremity, especially due to critical illness and steroid use  · Overnight stroke alert was called because of concern for worsening right upper extremity weakness  CT head showed questionable hypodensity but no evidence of acute stroke or bleeding  · Patient currently on therapeutic Lovenox  · This morning patient able to move his right upper extremity and also squeeze fingers  Strength is 2 to 3/5  He is not really able to lift his right upper extremity higher  This is his baseline  Continue monitor closely for now  Neurology was consulted given the normal CT head  8  For improve

## 2021-02-15 ENCOUNTER — APPOINTMENT (INPATIENT)
Dept: RADIOLOGY | Facility: HOSPITAL | Age: 61
DRG: 853 | End: 2021-02-15
Payer: COMMERCIAL

## 2021-02-15 PROBLEM — G93.40 ACUTE ENCEPHALOPATHY: Status: ACTIVE | Noted: 2021-02-15

## 2021-02-15 LAB
ANION GAP SERPL CALCULATED.3IONS-SCNC: 1 MMOL/L (ref 4–13)
ARTERIAL PATENCY WRIST A: YES
BASE EXCESS BLDA CALC-SCNC: 8.9 MMOL/L
BUN SERPL-MCNC: 17 MG/DL (ref 5–25)
CALCIUM SERPL-MCNC: 7.8 MG/DL (ref 8.3–10.1)
CHLORIDE SERPL-SCNC: 104 MMOL/L (ref 100–108)
CO2 SERPL-SCNC: 36 MMOL/L (ref 21–32)
CREAT SERPL-MCNC: 0.49 MG/DL (ref 0.6–1.3)
ERYTHROCYTE [DISTWIDTH] IN BLOOD BY AUTOMATED COUNT: 16.3 % (ref 11.6–15.1)
GFR SERPL CREATININE-BSD FRML MDRD: 117 ML/MIN/1.73SQ M
GLUCOSE SERPL-MCNC: 133 MG/DL (ref 65–140)
GLUCOSE SERPL-MCNC: 153 MG/DL (ref 65–140)
GLUCOSE SERPL-MCNC: 159 MG/DL (ref 65–140)
GLUCOSE SERPL-MCNC: 81 MG/DL (ref 65–140)
GLUCOSE SERPL-MCNC: 96 MG/DL (ref 65–140)
HCO3 BLDA-SCNC: 33.7 MMOL/L (ref 22–28)
HCT VFR BLD AUTO: 24.2 % (ref 36.5–49.3)
HCT VFR BLD AUTO: 25.4 % (ref 36.5–49.3)
HCT VFR BLD AUTO: 27 % (ref 36.5–49.3)
HGB BLD-MCNC: 7.2 G/DL (ref 12–17)
HGB BLD-MCNC: 7.4 G/DL (ref 12–17)
HGB BLD-MCNC: 8 G/DL (ref 12–17)
MAGNESIUM SERPL-MCNC: 2 MG/DL (ref 1.6–2.6)
MCH RBC QN AUTO: 28 PG (ref 26.8–34.3)
MCHC RBC AUTO-ENTMCNC: 29.6 G/DL (ref 31.4–37.4)
MCV RBC AUTO: 94 FL (ref 82–98)
NASAL CANNULA: 1
O2 CT BLDA-SCNC: 10 ML/DL (ref 16–23)
OXYHGB MFR BLDA: 87.5 % (ref 94–97)
PCO2 BLDA: 48.7 MM HG (ref 36–44)
PH BLDA: 7.46 [PH] (ref 7.35–7.45)
PLATELET # BLD AUTO: 287 THOUSANDS/UL (ref 149–390)
PMV BLD AUTO: 10.2 FL (ref 8.9–12.7)
PO2 BLDA: 55.7 MM HG (ref 75–129)
POTASSIUM SERPL-SCNC: 3.4 MMOL/L (ref 3.5–5.3)
RBC # BLD AUTO: 2.86 MILLION/UL (ref 3.88–5.62)
SODIUM SERPL-SCNC: 141 MMOL/L (ref 136–145)
SPECIMEN SOURCE: ABNORMAL
WBC # BLD AUTO: 9.76 THOUSAND/UL (ref 4.31–10.16)

## 2021-02-15 PROCEDURE — 85018 HEMOGLOBIN: CPT | Performed by: INTERNAL MEDICINE

## 2021-02-15 PROCEDURE — 36600 WITHDRAWAL OF ARTERIAL BLOOD: CPT

## 2021-02-15 PROCEDURE — 97535 SELF CARE MNGMENT TRAINING: CPT

## 2021-02-15 PROCEDURE — 80048 BASIC METABOLIC PNL TOTAL CA: CPT | Performed by: INTERNAL MEDICINE

## 2021-02-15 PROCEDURE — 83735 ASSAY OF MAGNESIUM: CPT | Performed by: PHYSICIAN ASSISTANT

## 2021-02-15 PROCEDURE — 99232 SBSQ HOSP IP/OBS MODERATE 35: CPT | Performed by: INTERNAL MEDICINE

## 2021-02-15 PROCEDURE — 85027 COMPLETE CBC AUTOMATED: CPT | Performed by: INTERNAL MEDICINE

## 2021-02-15 PROCEDURE — 82805 BLOOD GASES W/O2 SATURATION: CPT | Performed by: INTERNAL MEDICINE

## 2021-02-15 PROCEDURE — 99223 1ST HOSP IP/OBS HIGH 75: CPT | Performed by: INTERNAL MEDICINE

## 2021-02-15 PROCEDURE — 82948 REAGENT STRIP/BLOOD GLUCOSE: CPT

## 2021-02-15 PROCEDURE — 99232 SBSQ HOSP IP/OBS MODERATE 35: CPT | Performed by: PHYSICAL MEDICINE & REHABILITATION

## 2021-02-15 PROCEDURE — 71045 X-RAY EXAM CHEST 1 VIEW: CPT

## 2021-02-15 PROCEDURE — 99232 SBSQ HOSP IP/OBS MODERATE 35: CPT | Performed by: THORACIC SURGERY (CARDIOTHORACIC VASCULAR SURGERY)

## 2021-02-15 PROCEDURE — 97112 NEUROMUSCULAR REEDUCATION: CPT

## 2021-02-15 PROCEDURE — 85014 HEMATOCRIT: CPT | Performed by: INTERNAL MEDICINE

## 2021-02-15 PROCEDURE — 97530 THERAPEUTIC ACTIVITIES: CPT

## 2021-02-15 RX ORDER — ALBUMIN (HUMAN) 12.5 G/50ML
25 SOLUTION INTRAVENOUS ONCE
Status: DISCONTINUED | OUTPATIENT
Start: 2021-02-15 | End: 2021-02-25 | Stop reason: HOSPADM

## 2021-02-15 RX ORDER — FLUCONAZOLE 200 MG/1
400 TABLET ORAL EVERY 24 HOURS
Status: DISCONTINUED | OUTPATIENT
Start: 2021-02-16 | End: 2021-02-17

## 2021-02-15 RX ORDER — SODIUM CHLORIDE, SODIUM GLUCONATE, SODIUM ACETATE, POTASSIUM CHLORIDE, MAGNESIUM CHLORIDE, SODIUM PHOSPHATE, DIBASIC, AND POTASSIUM PHOSPHATE .53; .5; .37; .037; .03; .012; .00082 G/100ML; G/100ML; G/100ML; G/100ML; G/100ML; G/100ML; G/100ML
500 INJECTION, SOLUTION INTRAVENOUS ONCE
Status: COMPLETED | OUTPATIENT
Start: 2021-02-15 | End: 2021-02-15

## 2021-02-15 RX ADMIN — SODIUM CHLORIDE, SODIUM GLUCONATE, SODIUM ACETATE, POTASSIUM CHLORIDE, MAGNESIUM CHLORIDE, SODIUM PHOSPHATE, DIBASIC, AND POTASSIUM PHOSPHATE 500 ML: .53; .5; .37; .037; .03; .012; .00082 INJECTION, SOLUTION INTRAVENOUS at 00:35

## 2021-02-15 RX ADMIN — GLYCOPYRROLATE 1 MG: 1 TABLET ORAL at 16:41

## 2021-02-15 RX ADMIN — Medication 2000 UNITS: at 09:00

## 2021-02-15 RX ADMIN — MICONAZOLE NITRATE 1 APPLICATION: 20 CREAM TOPICAL at 21:19

## 2021-02-15 RX ADMIN — ASPIRIN 81 MG: 81 TABLET, CHEWABLE ORAL at 09:00

## 2021-02-15 RX ADMIN — Medication 20 MG: at 05:59

## 2021-02-15 RX ADMIN — GLYCOPYRROLATE 1 MG: 1 TABLET ORAL at 09:01

## 2021-02-15 RX ADMIN — GLYCOPYRROLATE 1 MG: 1 TABLET ORAL at 21:17

## 2021-02-15 RX ADMIN — SENNOSIDES 8.6 MG: 8.6 TABLET, FILM COATED ORAL at 16:41

## 2021-02-15 RX ADMIN — GABAPENTIN 400 MG: 400 CAPSULE ORAL at 09:00

## 2021-02-15 RX ADMIN — ENOXAPARIN SODIUM 70 MG: 80 INJECTION SUBCUTANEOUS at 21:16

## 2021-02-15 RX ADMIN — INSULIN LISPRO 1 UNITS: 100 INJECTION, SOLUTION INTRAVENOUS; SUBCUTANEOUS at 11:26

## 2021-02-15 RX ADMIN — SENNOSIDES 8.6 MG: 8.6 TABLET, FILM COATED ORAL at 09:05

## 2021-02-15 RX ADMIN — DICLOFENAC SODIUM 4 G: 10 GEL TOPICAL at 09:01

## 2021-02-15 RX ADMIN — SODIUM CHLORIDE 1000 ML: 0.9 INJECTION, SOLUTION INTRAVENOUS at 13:50

## 2021-02-15 RX ADMIN — MICAFUNGIN SODIUM 100 MG: 50 INJECTION, POWDER, LYOPHILIZED, FOR SOLUTION INTRAVENOUS at 09:13

## 2021-02-15 RX ADMIN — ENOXAPARIN SODIUM 70 MG: 80 INJECTION SUBCUTANEOUS at 09:00

## 2021-02-15 RX ADMIN — Medication 1 SPRAY: at 21:28

## 2021-02-15 RX ADMIN — MICONAZOLE NITRATE 1 APPLICATION: 20 CREAM TOPICAL at 16:41

## 2021-02-15 RX ADMIN — METHADONE HYDROCHLORIDE 70 MG: 10 TABLET ORAL at 09:05

## 2021-02-15 RX ADMIN — INSULIN GLARGINE 10 UNITS: 100 INJECTION, SOLUTION SUBCUTANEOUS at 21:17

## 2021-02-15 RX ADMIN — INSULIN LISPRO 1 UNITS: 100 INJECTION, SOLUTION INTRAVENOUS; SUBCUTANEOUS at 16:41

## 2021-02-15 RX ADMIN — ACETAMINOPHEN 650 MG: 325 TABLET, FILM COATED ORAL at 23:30

## 2021-02-15 RX ADMIN — MICONAZOLE NITRATE 1 APPLICATION: 20 CREAM TOPICAL at 09:01

## 2021-02-15 RX ADMIN — Medication 1 TABLET: at 09:00

## 2021-02-15 RX ADMIN — DICLOFENAC SODIUM 4 G: 10 GEL TOPICAL at 21:18

## 2021-02-15 NOTE — PROGRESS NOTES
PM&R Consult Follow Up Note  Barry Farley 56 y o  male MRN: [id number]  Unit/Bed#: [facility] 717-01 Encounter: [id number]     Assessment:  Rehabilitation Diagnosis:   · Critical illness myopathy   · Right transfemoral amputation   · Pneumonia due to COVID 19  · Left foot drop  · Impaired mobility and self care     Recommendations:  Rehabilitation Plan:  · Continue PT/OT (SLP) while on acute care  · Has made limited progress  · At this time not medically appropriate for acute inpatient rehab  · Would consider LTACH if he has benefits  · We discussed prevention of contracture in R AKA  · Discussed prevention of plantarflexion contracture and placed multi-podus boot  · If he develops contracture in his plantar flexors in his LLE, he may not be able to ambulate on that leg  Please be consistent with multi-podus/range of motion  · May ultimately be wheelchair level of function (considering pulmonary reserve, and cardiac deconditioning, and expected increase in energy expenditure walking with AKA/prosthetic being 100% - unlikely to be able to do) - but would not sit in wheelchair given his sacral wound  Sacral wound needs to heal   · Discussed in detail with case management  · Will continue to follow his medical/functional progress  · Covid-19 Testing: [facility] rehabilitation units require testing within 48 hours of potential admission for all general admissions  Please re-test if needed  *Re-testing is NOT required for patients recovering from COVID-19 infection if isolation has been discontinued per CDC criteria            Medical Co-morbidities Plan:  · Right Transfemoral amputation  · 1/14 ischemic limb during this hospitalization  · On therapeutic Lovenox     · Pneumonia due to COVID-19 virus  · Now off isolation as per CDC guidelines  · Left pneumothorax s/p left chest tube placement   · Persistent  · Repeat CXR today  · On micafungin for now  · Will likely need surgical intervention, but not a candidate now due to current medical status  · Continues with significant output from chest tube  · Has required tPA, may repeat again through chest tube  · Right sided weakness  · Per Neuro, premorbid  · Repeat imaging without acute findings  · Felt to be due to critical illness  · Fevers  · Continues still  Otherwise asymptomatic  · On antifungals as per pulmonology   · Monitoring off abx  WBC WNL  · T2DM  · Now on Lantus and prandial insulin  · R femoral vein DVT  · On therapeutic Lovenox   · Metabolic acidosis  · Aortic thrombus leading to need for transfemoral amputation  · Therapeutic lovenox  · Hematuria post cystoscopy  · Acute respiratory failure with hypoxia, resolving   · Anemia   · Unstageable sacral pressure injury  · Right 2nd finger DTI  · DVT ppx:  lovenox and SCD    Thank you for allowing the PM&R service to participate in the care of Mr Sherrill Torres  We will continue to follow he progress with you  Please do not hesitate to call with questions or concerns  Last Seen: 2/10    Interval History/Subjective:   2/10 Diagnosed with large sacral unstageable (evolved DTI)  2/12 Repeat CXR reviewed with moderate PTX still present  Thoracic sx consulted, who recommended repeat CT Chest and starting an antifungal    2/13 Repeat chest imaging showed that L pleural space issues will likely not resolve without operative intervention (moderate L hydroPTX partially loculated), however patient is a poor surgical candidate for lung surgery given COVID PNA  They gave 1 dose of tPA to help drain the L pleural space  Per pulm will need likely 2 weeks of treatment with antifungal due to Candida growth in pleural fluid  2/14 With L sided chest pain  Troponin WNL, EKG no ischemic changes  RUE flaccid (stroke alert called) started on pathway  CTA H/N - no hemorrhage, subtle hypodensity in L cerebral peduncle, and no LVO  MRI deferred by Neuro - recommended d/c ASA 81mg   Per neuro, R sided symptoms were previously present (no concern for acute process)  Got 2nd dose of tPA to chest tube  Has been having fevers  Feels very weak, tired  Needs assistance to eat  Denies any CP, SOB, fevers, chills, N/V  Last BM 2/15  ROS: A 10 point review of systems was negative except for what is noted in the HPI  Physical Therapy: Has made little progress - mod-maxA for bed mobility, actually has worsened to maxA x2 with transfers compared to previous OT documented below  Occupational Therapy: [address] [place] x2 for bed mobiltiy/transfers  modA for grooming  Speech Therapy: Dysphagia 2 diet with NTL  Vital Signs:      Temp:  [98 2 °F (36 8 °C)-99 7 °F (37 6 °C)] 98 8 °F (37 1 °C)  HR:  [79-97] 79  Resp:  [18-25] 20  BP: ()/(46-60) 86/52  FiO2 (%):  [75] 75   Intake/Output Summary (Last 24 hours) at 2/15/2021 1402  Last data filed at 2/15/2021 0744  Gross per 24 hour   Intake 600 ml   Output 1450 ml   Net -850 ml        Gen: No acute distress, ill and frail appearing  HEENT: Moist mucus membranes, Normocephalic/Atraumatic  Cardiovascular: Regular  Heme/Extr: No edema  Pulmonary: Non-labored breathing  Lungs CTAB  : No rogers  GI: Soft, non-tender, non-distended  BS+  Integumentary: Skin is warm, dry  Neuro: AAOx3, Reflexes are 2+ and symmetric in bicep,tricep, patella  Increased tone in L plantarflexor  Able to range to almost neutral, but very tight  MMT:   Strength:   Right  Left  Site  Right  Left  Site    1 3  S Ab: Shoulder Abductors  4  1  HF: Hip Flexors    2 4  EF: Elbow Flexors  -  3 KF: Knee Flexors    1  4-  EE: Elbow Extensors  -  3  KE: Knee Extensors    1  4  WE: Wrist Extensors  -  1  DR: Dorsi Flexors    3  3  FF: Finger Flexors  -  *  PF: Plantar Flexors    1  3  HI: Hand Intrinsics  -  *  EHL: Extensor Hallucis Longus   Psych: Normal mood and affect         Laboratory:      Lab Results   Component Value Date    HGB 8 0 (L) 02/15/2021    HCT 27 0 (L) 02/15/2021    WBC 9 76 02/15/2021     Lab Results Component Value Date    BUN 17 02/15/2021    K 3 4 (L) 02/15/2021     02/15/2021    GLUCOSE 179 (H) 01/13/2021    CREATININE 0 49 (L) 02/15/2021     Lab Results   Component Value Date    PROTIME 17 5 (H) 01/14/2021    INR 1 44 (H) 01/14/2021        Imaging (reviewed):  2/12 CT chest:  There is a moderate-sized left hydropneumothorax which appears at least partially loculated  This appears similar in size to the February 12, 2021 plain film examination  A smallbore pigtail chest tube catheter is in place in the posterolateral lower   left hemithorax pleural space; the metallic marker is located just lateral to the rib margin      Extensive areas of dense consolidation are present bilaterally within the lungs  This appears significantly worse compared with January 12, 2021      The blood in the cardiac chambers appears somewhat hypodense relative to the ventricular walls  This suggests anemia  Correlation with hemoglobin/hematocrit levels is recommended      Cholelithiasis  2/13 CTA H/N     No large vessel flow restrictive disease within the head or neck  No evidence of high-grade proximal stenosis of the visualized [place]  No evidence of acute arterial dissection      Portions of the right transverse sinus are not well visualized, however, the left-sided transverse sinus is prominent and appears dominant  If there is clinical concern for acute pathology involving the right transverse sinus, MRV could be performed for   further evaluation, if there are no contraindications  2/13 CTH:  No acute intracranial hemorrhage  Questionable subtle hypodensity superior aspect left cerebral peduncle  MRI with diffusion-weighted imaging is recommended for further evaluation, if there are no contraindications      2/15 CXR:  No change in moderate left pneumothorax      Left pleural effusion on recent CT not visible     Persistent extensive bilateral consolidation and groundglass opacity related to Covid 19 pneumonia/post infectious scar

## 2021-02-15 NOTE — QUICK NOTE
Vascular Surgery  Quick Note    · 1/13 RLE fem & pop embolectomy, [facility] - [facility]  (POD# 35)  · 1/14 R Ivelisse SAVAGE - [facility] (POD #32)  · 1/18 R AKA formalization & closure - [facility] (POD #28)    Called by RN, Tacho Wilkes:  ? When staples from R groin can be removed (Surgery = 1/13/2021)  Yesterday, R groin wound w/ "small amount of pus-like drainage coming out near one of staples  Today, looks ok"      Pt seen & examined w/ RN at bedside  R groin w/ staples well incorporated into healed incision w/ some honey-colored crust/ scab  PeriWound erythema c/w healing/ staple reaction  Staples removed  Incision cleansed w/ Hydrogen peroxide  Pat dry thoroughly  Betadine paint  Dry gauze to cover to keep area clean and dry and to prevent skin- skin coverage  R AKA healing well- lateral suture line necrosis  Staples/ sutures intact    (-) erythema/ drainage/ malodor:            --Cont LWC  --Cont wound surveillance    Hugo Franco PA-C  2/15/2021

## 2021-02-15 NOTE — PROGRESS NOTES
Progress Note - Thoracic Surgery   Radha Mccurdy 56 y o  male MRN: [id number]  Unit/Bed#: [facility] 717-01 Encounter: [id number]    Assessment:  60M w multiple medical comorbidities including PAD and acute RLE ischemia s/p recent R AKA, aortic thrombus currently on therapeutic anticoagulation, IVDU on methadone, recent COVID PNA w prolonged intubation, who p/w L hydroptx ex vacuo  Received 2nd dose of TPA+dornase via Chest tube yesterday and subsequently had 645 cc serosang from the chest tube  He had 2 episodes of emesis, each time accompanied by desaturation which improved shortly after the emesis stopped  It appears that the desats are caused by aspiration vs Valsalva during the vomiting episode itself  He was started on abx on 2/13  - L CT to suction, -AL, 645 output serous to serosang     Plan:  Continue antimicrobials including micafungin  Fever and leukocytosis likely secondary to SIRS response from TPA  L CT to suction  Will repeat imaging at some point, no surgical intervention currently anticipated  Wean O2 as tolerated  Therapeutic lovenox  Respiratory protocol, aspiration precautions      Subjective/Objective     Subjective:   - patient drowsy this am, but arousable to voice  Objective:     Blood pressure 97/54, pulse 87, temperature 99 7 °F (37 6 °C), resp  rate 18, height 5' 6" (1 676 m), weight 63 8 kg (140 lb 10 5 oz), SpO2 95 %  ,Body mass index is 22 7 kg/m²        Intake/Output Summary (Last 24 hours) at 2/15/2021 0600  Last data filed at 2/14/2021 1844  Gross per 24 hour   Intake 1262 26 ml   Output 1345 ml   Net -82 74 ml       Invasive Devices     Peripheral Intravenous Line            Peripheral IV 02/13/21 Right Wrist 1 day    Peripheral IV 02/14/21 Right Antecubital 1 day          Drain            Chest Tube 1 Left Midaxillary;Pleural 10 Fr  4 days                Physical Exam:     GEN: NAD  HEENT: MMM  Chest: L CT to suction with 645 cc serosang output, -AL  CV: RRR  Lung: normal effort  Ab: Soft, NT/ND  Extrem: No CCE  Neuro:  A+Ox3, motor and sensation grossly intact    Lab, Imaging and other studies:  CBC:   No results found for: WBC, HGB, HCT, MCV, PLT, ADJUSTEDWBC, MCH, MCHC, RDW, MPV, NRBC, CMP:   No results found for: SODIUM, K, CL, CO2, ANIONGAP, BUN, CREATININE, GLUCOSE, CALCIUM, AST, ALT, ALKPHOS, PROT, BILITOT, EGFR, Coagulation: No results found for: PT, INR, APTT, Urinalysis: No results found for: COLORU, CLARITYU, SPECGRAV, PHUR, LEUKOCYTESUR, NITRITE, PROTEINUA, GLUCOSEU, KETONESU, BILIRUBINUR, BLOODU  VTE Pharmacologic Prophylaxis: Enoxaparin (Lovenox)  VTE Mechanical Prophylaxis: sequential compression device

## 2021-02-15 NOTE — PLAN OF CARE
Problem: Prexisting or High Potential for Compromised Skin Integrity  Goal: Skin integrity is maintained or improved  Description: INTERVENTIONS:  - Identify patients at risk for skin breakdown  - Assess and monitor skin integrity  - Assess and monitor nutrition and hydration status  - Monitor labs   - Assess for incontinence   - Turn and reposition patient  - Assist with mobility/ambulation  - Relieve pressure over bony prominences  - Avoid friction and shearing  - Provide appropriate hygiene as needed including keeping skin clean and dry  - Evaluate need for skin moisturizer/barrier cream  - Collaborate with interdisciplinary team   - Patient/family teaching  - Consider wound care consult   Outcome: Progressing     Problem: Potential for Falls  Goal: Patient will remain free of falls  Description: INTERVENTIONS:  - Assess patient frequently for physical needs  -  Identify cognitive and physical deficits and behaviors that affect risk of falls  -  [place] fall precautions as indicated by assessment   - Educate patient/family on patient safety including physical limitations  - Instruct patient to call for assistance with activity based on assessment  - Modify environment to reduce risk of injury  - Consider OT/PT consult to assist with strengthening/mobility  Outcome: Progressing     Problem: Nutrition/Hydration-ADULT  Goal: Nutrient/Hydration intake appropriate for improving, restoring or maintaining nutritional needs  Description: Monitor and assess patient's nutrition/hydration status for malnutrition  Collaborate with interdisciplinary team and initiate plan and interventions as ordered  Monitor patient's weight and dietary intake as ordered or per policy  Utilize nutrition screening tool and intervene as necessary  Determine patient's food preferences and provide high-protein, high-caloric foods as appropriate       INTERVENTIONS:  - Monitor oral intake, urinary output, labs, and treatment plans  - Assess nutrition and hydration status and recommend course of action  - Evaluate amount of meals eaten  - Assist patient with eating if necessary   - Allow adequate time for meals  - Recommend/ encourage appropriate diets, oral nutritional supplements, and vitamin/mineral supplements  - Order, calculate, and assess calorie counts as needed  - Assess need for intravenous fluids  - Provide specific nutrition/hydration education as appropriate  - Include patient/family/caregiver in decisions related to nutrition  Outcome: Progressing     Problem: PAIN - ADULT  Goal: Verbalizes/displays adequate comfort level or baseline comfort level  Description: Interventions:  - Encourage patient to monitor pain and request assistance  - Assess pain using appropriate pain scale  - Administer analgesics based on type and severity of pain and evaluate response  - Implement non-pharmacological measures as appropriate and evaluate response  - Consider cultural and social influences on pain and pain management  - Notify physician/advanced practitioner if interventions unsuccessful or patient reports new pain  Outcome: Progressing     Problem: INFECTION - ADULT  Goal: Absence or prevention of progression during hospitalization  Description: INTERVENTIONS:  - Assess and monitor for signs and symptoms of infection  - Monitor lab/diagnostic results  - Monitor all insertion sites, i e  indwelling lines, tubes, and drains  - Start appropriate cooling/warming therapies per order  - Administer medications as ordered  - Instruct and encourage patient and family to use good hand hygiene technique  - Identify and instruct in appropriate isolation precautions for identified infection/condition  Outcome: Progressing  Goal: Absence of fever/infection during neutropenic period  Description: INTERVENTIONS:  - Monitor WBC    Outcome: Progressing     Problem: SAFETY ADULT  Goal: Patient will remain free of falls  Description: INTERVENTIONS:  - Assess patient frequently for physical needs  -  Identify cognitive and physical deficits and behaviors that affect risk of falls    -  [place] fall precautions as indicated by assessment   - Educate patient/family on patient safety including physical limitations  - Instruct patient to call for assistance with activity based on assessment  - Modify environment to reduce risk of injury  - Consider OT/PT consult to assist with strengthening/mobility  Outcome: Progressing  Goal: Maintain or return to baseline ADL function  Description: INTERVENTIONS:  -  Assess patient's ability to carry out ADLs; assess patient's baseline for ADL function and identify physical deficits which impact ability to perform ADLs (bathing, care of mouth/teeth, toileting, grooming, dressing, etc )  - Assess/evaluate cause of self-care deficits   - Assess range of motion  - Assess patient's mobility; develop plan if impaired  - Assess patient's need for assistive devices and provide as appropriate  - Encourage maximum independence but intervene and supervise when necessary  - Involve family in performance of ADLs  - Assess for home care needs following discharge   - Consider OT consult to assist with ADL evaluation and planning for discharge  - Provide patient education as appropriate  Outcome: Progressing  Goal: Maintain or return mobility status to optimal level  Description: INTERVENTIONS:  - Assess patient's baseline mobility status (ambulation, transfers, stairs, etc )    - Identify cognitive and physical deficits and behaviors that affect mobility  - Identify mobility aids required to assist with transfers and/or ambulation (gait belt, sit-to-stand, lift, walker, cane, etc )  - [place] fall precautions as indicated by assessment  - Record patient progress and toleration of activity level on Mobility SBAR; progress patient to next Phase/Stage  - Instruct patient to call for assistance with activity based on assessment  - Consider rehabilitation consult to assist with strengthening/weightbearing, etc   Outcome: Progressing     Problem: DISCHARGE PLANNING  Goal: Discharge to home or other facility with appropriate resources  Description: INTERVENTIONS:  - Identify barriers to discharge w/patient and caregiver  - Arrange for needed discharge resources and transportation as appropriate  - Identify discharge learning needs (meds, wound care, etc )  - Arrange for interpretive services to assist at discharge as needed  - Refer to Case Management Department for coordinating discharge planning if the patient needs post-hospital services based on physician/advanced practitioner order or complex needs related to functional status, cognitive ability, or social support system  Outcome: Progressing     Problem: Knowledge Deficit  Goal: Patient/family/caregiver demonstrates understanding of disease process, treatment plan, medications, and discharge instructions  Description: Complete learning assessment and assess knowledge base    Interventions:  - Provide teaching at level of understanding  - Provide teaching via preferred learning methods  Outcome: Progressing

## 2021-02-15 NOTE — ASSESSMENT & PLAN NOTE
Lab Results   Component Value Date    HGBA1C 11 6 (H) 12/09/2020       Recent Labs     02/14/21  2108 02/15/21  0606 02/15/21  1058 02/15/21  1633   POCGLU 190* 96 153* 159*       Blood Sugar Average: Last 72 hrs:  (P) 162 9375     · Continue Lantus 10 units with 3 units Humalog t i d  With meals  Continue sliding scale

## 2021-02-15 NOTE — PLAN OF CARE
Problem: Prexisting or High Potential for Compromised Skin Integrity  Goal: Skin integrity is maintained or improved  Description: INTERVENTIONS:  - Identify patients at risk for skin breakdown  - Assess and monitor skin integrity  - Assess and monitor nutrition and hydration status  - Monitor labs   - Assess for incontinence   - Turn and reposition patient  - Assist with mobility/ambulation  - Relieve pressure over bony prominences  - Avoid friction and shearing  - Provide appropriate hygiene as needed including keeping skin clean and dry  - Evaluate need for skin moisturizer/barrier cream  - Collaborate with interdisciplinary team   - Patient/family teaching  - Consider wound care consult   Outcome: Progressing     Problem: Potential for Falls  Goal: Patient will remain free of falls  Description: INTERVENTIONS:  - Assess patient frequently for physical needs  -  Identify cognitive and physical deficits and behaviors that affect risk of falls  -  [place] fall precautions as indicated by assessment   - Educate patient/family on patient safety including physical limitations  - Instruct patient to call for assistance with activity based on assessment  - Modify environment to reduce risk of injury  - Consider OT/PT consult to assist with strengthening/mobility  Outcome: Progressing     Problem: Nutrition/Hydration-ADULT  Goal: Nutrient/Hydration intake appropriate for improving, restoring or maintaining nutritional needs  Description: Monitor and assess patient's nutrition/hydration status for malnutrition  Collaborate with interdisciplinary team and initiate plan and interventions as ordered  Monitor patient's weight and dietary intake as ordered or per policy  Utilize nutrition screening tool and intervene as necessary  Determine patient's food preferences and provide high-protein, high-caloric foods as appropriate       INTERVENTIONS:  - Monitor oral intake, urinary output, labs, and treatment plans  - Assess nutrition and hydration status and recommend course of action  - Evaluate amount of meals eaten  - Assist patient with eating if necessary   - Allow adequate time for meals  - Recommend/ encourage appropriate diets, oral nutritional supplements, and vitamin/mineral supplements  - Order, calculate, and assess calorie counts as needed  - Assess need for intravenous fluids  - Provide specific nutrition/hydration education as appropriate  - Include patient/family/caregiver in decisions related to nutrition  Outcome: Progressing     Problem: PAIN - ADULT  Goal: Verbalizes/displays adequate comfort level or baseline comfort level  Description: Interventions:  - Encourage patient to monitor pain and request assistance  - Assess pain using appropriate pain scale  - Administer analgesics based on type and severity of pain and evaluate response  - Implement non-pharmacological measures as appropriate and evaluate response  - Consider cultural and social influences on pain and pain management  - Notify physician/advanced practitioner if interventions unsuccessful or patient reports new pain  Outcome: Progressing     Problem: INFECTION - ADULT  Goal: Absence or prevention of progression during hospitalization  Description: INTERVENTIONS:  - Assess and monitor for signs and symptoms of infection  - Monitor lab/diagnostic results  - Monitor all insertion sites, i e  indwelling lines, tubes, and drains  - [place] appropriate cooling/warming therapies per order  - Administer medications as ordered  - Instruct and encourage patient and family to use good hand hygiene technique  - Identify and instruct in appropriate isolation precautions for identified infection/condition  Outcome: Progressing  Goal: Absence of fever/infection during neutropenic period  Description: INTERVENTIONS:  - Monitor WBC    Outcome: Progressing     Problem: SAFETY ADULT  Goal: Patient will remain free of falls  Description: INTERVENTIONS:  - Assess patient frequently for physical needs  -  Identify cognitive and physical deficits and behaviors that affect risk of falls    -  [place] fall precautions as indicated by assessment   - Educate patient/family on patient safety including physical limitations  - Instruct patient to call for assistance with activity based on assessment  - Modify environment to reduce risk of injury  - Consider OT/PT consult to assist with strengthening/mobility  Outcome: Progressing  Goal: Maintain or return to baseline ADL function  Description: INTERVENTIONS:  -  Assess patient's ability to carry out ADLs; assess patient's baseline for ADL function and identify physical deficits which impact ability to perform ADLs (bathing, care of mouth/teeth, toileting, grooming, dressing, etc )  - Assess/evaluate cause of self-care deficits   - Assess range of motion  - Assess patient's mobility; develop plan if impaired  - Assess patient's need for assistive devices and provide as appropriate  - Encourage maximum independence but intervene and supervise when necessary  - Involve family in performance of ADLs  - Assess for home care needs following discharge   - Consider OT consult to assist with ADL evaluation and planning for discharge  - Provide patient education as appropriate  Outcome: Progressing  Goal: Maintain or return mobility status to optimal level  Description: INTERVENTIONS:  - Assess patient's baseline mobility status (ambulation, transfers, stairs, etc )    - Identify cognitive and physical deficits and behaviors that affect mobility  - Identify mobility aids required to assist with transfers and/or ambulation (gait belt, sit-to-stand, lift, walker, cane, etc )  - [place] fall precautions as indicated by assessment  - Record patient progress and toleration of activity level on Mobility SBAR; progress patient to next Phase/Stage  - Instruct patient to call for assistance with activity based on assessment  - Consider rehabilitation consult to assist with strengthening/weightbearing, etc   Outcome: Progressing     Problem: DISCHARGE PLANNING  Goal: Discharge to home or other facility with appropriate resources  Description: INTERVENTIONS:  - Identify barriers to discharge w/patient and caregiver  - Arrange for needed discharge resources and transportation as appropriate  - Identify discharge learning needs (meds, wound care, etc )  - Arrange for interpretive services to assist at discharge as needed  - Refer to Case Management Department for coordinating discharge planning if the patient needs post-hospital services based on physician/advanced practitioner order or complex needs related to functional status, cognitive ability, or social support system  Outcome: Progressing     Problem: Knowledge Deficit  Goal: Patient/family/caregiver demonstrates understanding of disease process, treatment plan, medications, and discharge instructions  Description: Complete learning assessment and assess knowledge base    Interventions:  - Provide teaching at level of understanding  - Provide teaching via preferred learning methods  Outcome: Progressing

## 2021-02-15 NOTE — ASSESSMENT & PLAN NOTE
· Profound weakness, predominantly in right upper extremity, especially due to critical illness and steroid use  · Overnight stroke alert was called because of concern for worsening right upper extremity weakness  CT head showed questionable hypodensity but no evidence of acute stroke or bleeding  · Patient currently on therapeutic Lovenox  · Strength currently 2 to 3/5 at baseline  · Evaluated by Neurology  No further inpatient workup required

## 2021-02-15 NOTE — PROGRESS NOTES
Pastoral Care Progress Note    2/15/2021  Patient: Carlos Ramos : 1960  Admission Date & Time: 2021 1428  MRN: [id number] [facility]: [id number]                     Chaplaincy Interventions Utilized:   Empowerment: Encouraged self-care and Reframed experience of patient/family    Exploration: Explored emotional needs & resources and Explored spiritual needs & resources    Collaboration: Facilitated respect for spiritual/cultural practice during hospitalization    Relationship Building: Cultivated a relationship of care and support      Chaplaincy Outcomes Achieved:  Expressed gratitude, Expressed peace, Identified meaningful connections and Improved communication    Spiritual Coping Strategies Utilized:   No spiritual coping       02/15/21 1500   Clinical Encounter Type   Visited With Patient   Routine Visit Introduction   Referral From

## 2021-02-15 NOTE — CASE MANAGEMENT
CM was informed by PM and R, Dr Prescott Friends that pt is not appropriate with [facility] acute rehab and may benefit with [facility], [facility] referral     CM called and spoke with pt's nephew Gilda Guillen and informed him of the above  Gilda Guillen was agreeable fro CM to send referral to [address] , referral in [address]  CM will follow

## 2021-02-15 NOTE — PROGRESS NOTES
Progress Note Angi Leblanc 1960, 56 y o  male MRN: [id number]    Unit/Bed#: [facility] 717-01 Encounter: [id number]    Primary Care Provider: VERONICA Almanzar   Date and time admitted to hospital: 1/13/2021  2:28 PM        * Pneumonia due to COVID-19 virus  Assessment & Plan  · Tested positive for covid 12/31/2020  · S/p completion of covid tx  · S/p completion of IV abx for possible superimposed bacterial pna  · Considered recovered; off isolation    Acute encephalopathy  Assessment & Plan  Most likely toxic metabolic due to methadone and gabapentin  With oral was discontinued  Gabapentin dose decreased  Patient more awake and alert later in afternoon  For now continue to hold methadone  Gabapentin dose decreased to 100 mg t i d  Fever  Assessment & Plan  Patient had a fever of 101 8 previous night and 100 8 after that  None since then  Otherwise asymptomatic  No evidence of pneumonia on x-ray  Pleural fluid cultures growing Candida  Patient started on anti fungal per pulmonology  Continue micafungin for now  Left Hydropneumothorax  Assessment & Plan  Status post chest tube placement  Currently connected to suction  Patient's respiratory status has improved  Oxygen requirements back to 2-3 L  Pulmonology and thoracic surgery following  Chest x-ray shows persistent hydropneumothorax  Plan for  tPA and dornase per thoracic surgery team     Ischemia of right lower extremity with suspected rhabdomyolysis  Assessment & Plan  · Suspected embolic from aortic thrombus  · 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  · Continue therapeutic Lovenox   · Currently stable from a vascular surgery standpoint    Acute respiratory failure with hypoxia ([address] )  Assessment & Plan  · Secondary to COVID 19  · Extubated 1/25  · O2 requirements worsen because of hydropneumothorax  Now improved and back to 2-3 L  Right femoral vein DVT (HCC)  Assessment & Plan  Continue Lovenox       Edema of right upper extremity  Assessment & Plan  · +2 pitting edema also with significant weakness/paresis   · S/p Venous duplex on 01/26 negative for acute or chronic DVT   · Resumed back on lasix with some improvement    Oropharyngeal dysphagia  Assessment & Plan  · Extubated 1/25 and required NGT for nutrition   · Speech therapy following   · S/p barium swallow  · Continue modified diet  · Continue robinul  · S/p ENT consult, no vocal cord motion impairment  Muscle weakness of right upper extremity  Assessment & Plan  · Profound weakness, predominantly in right upper extremity, especially due to critical illness and steroid use  · Overnight stroke alert was called because of concern for worsening right upper extremity weakness  CT head showed questionable hypodensity but no evidence of acute stroke or bleeding  · Patient currently on therapeutic Lovenox  · Strength currently 2 to 3/5 at baseline  · Evaluated by Neurology  No further inpatient workup required  Aortic thrombus [facility])  Assessment & Plan  · With embolic event that compromised right femoral artery  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  S/p R AKA formalization 1/18   · Continue therapeutic lovenox  · F/u with vascular surgery as outpatient    Type 2 diabetes mellitus, with long-term current use of insulin [facility])  Assessment & Plan  Lab Results   Component Value Date    HGBA1C 11 6 (H) 12/09/2020       Recent Labs     02/14/21  2108 02/15/21  0606 02/15/21  1058 02/15/21  1633   POCGLU 190* 96 153* 159*       Blood Sugar Average: Last 72 hrs:  (P) 162 9375     · Continue Lantus 10 units with 3 units Humalog t i d  With meals  Continue sliding scale  Methadone maintenance therapy patient [facility])  Assessment & Plan  Chronic methadone use  Methadone discontinue this morning because lethargy  Apparently every morning after patient gets methadone has been very drowsy lethargic      HTN (hypertension)  Assessment & Plan  · Stable  · Continue lisinopril, lasix        VTE Pharmacologic Prophylaxis:   Pharmacologic: Enoxaparin (Lovenox)  Mechanical VTE Prophylaxis in Place: Yes    Patient Centered Rounds: I have performed bedside rounds with nursing staff today  Discussions with Specialists or Other Care Team Provider: thoracic, pulmonology, Infectious Disease    Education and Discussions with Family / Patient: pt  Called and updated kenya    Time Spent for Care: 30 minutes  More than 50% of total time spent on counseling and coordination of care as described above  Current Length of Stay: 33 day(s)    Current Patient Status: Inpatient   Certification Statement: The patient will continue to require additional inpatient hospital stay due to above    Discharge Plan:     Code Status: Level 1 - Full Code      Subjective:   Pt seen and examined by me this morning  Pt was drowsy lethargic in the morning today but later in the afternoon woke up  Denies any specific complaints  Objective:     Vitals:   Temp (24hrs), Av 1 °F (37 3 °C), Min:98 5 °F (36 9 °C), Max:99 7 °F (37 6 °C)    Temp:  [98 5 °F (36 9 °C)-99 7 °F (37 6 °C)] 98 5 °F (36 9 °C)  HR:  [79-97] 90  Resp:  [18-25] 20  BP: ()/(46-59) 107/59  SpO2:  [92 %-98 %] 92 %  Body mass index is 22 7 kg/m²  Input and Output Summary (last 24 hours): Intake/Output Summary (Last 24 hours) at 2/15/2021 1726  Last data filed at 2/15/2021 1500  Gross per 24 hour   Intake 1103 33 ml   Output 1060 ml   Net 43 33 ml       Physical Exam:     Physical Exam    In afternoon  Constitutional: Pt appears comfortable  Not in any acute distress  Cardiovascular: Normal rate, regular rhythm, normal heart sounds  No murmur heard  Pulmonary/Chest: Effort normal, air entry b/l equal  No respiratory distress  Pt has no wheezes or crackles  Abdominal: Soft  Non-distended, Non-tender  Bowel sounds are normal      Neurological: awake, alert  Psychiatric: normal mood and affect       Additional Data: Labs:    Results from last 7 days   Lab Units 02/15/21  1358 02/15/21  0558   WBC Thousand/uL  --  9 76   HEMOGLOBIN g/dL 7 2* 8 0*   HEMATOCRIT % 24 2* 27 0*   PLATELETS Thousands/uL  --  287     Results from last 7 days   Lab Units 02/15/21  0558   SODIUM mmol/L 141   POTASSIUM mmol/L 3 4*   CHLORIDE mmol/L 104   CO2 mmol/L 36*   BUN mg/dL 17   CREATININE mg/dL 0 49*   ANION GAP mmol/L 1*   CALCIUM mg/dL 7 8*   GLUCOSE RANDOM mg/dL 81         Results from last 7 days   Lab Units 02/15/21  1633 02/15/21  1058 02/15/21  0606 02/14/21  2108 02/14/21  1608 02/14/21  1113 02/14/21  0842 02/13/21  2041 02/13/21  1617 02/13/21  1101 02/13/21  0704 02/12/21  2347   POC GLUCOSE mg/dl 159* 153* 96 190* 228* 205* 191* 132 115 146* 122 192*         Results from last 7 days   Lab Units 02/13/21  2036 02/11/21  0536 02/10/21  0642   PROCALCITONIN ng/ml 0 30* 0 27* 0 31*           * I Have Reviewed All Lab Data Listed Above  * Additional Pertinent Lab Tests Reviewed: Jaime 64 Admission Reviewed    Imaging:    Imaging Reports Reviewed Today Include:   Imaging Personally Reviewed by Myself Includes:     Recent Cultures (last 7 days):     Results from last 7 days   Lab Units 02/14/21  0218 02/14/21  0217 02/10/21  1253   BLOOD CULTURE  No Growth at 24 hrs   No Growth at 24 hrs   --    GRAM STAIN RESULT   --   --  1+ Polys  No organisms seen   BODY FLUID CULTURE, STERILE   --   --  Few Colonies of Candida albicans*       Last 24 Hours Medication List:   Current Facility-Administered Medications   Medication Dose Route Frequency Provider Last Rate    acetaminophen  650 mg Rectal Q6H PRN Wyvonne Shone, MD      acetaminophen  650 mg Oral Q6H PRN VERONICA Kennedy      albumin human  25 g Intravenous Once Wyvonne Shone, MD      albuterol  2 puff Inhalation Q4H PRN Marga Newman DO      bisacodyl  10 mg Rectal Daily PRN VERONICA Schulte      cholecalciferol  2,000 Units Oral Daily Shannan A Sedora, CRNP      Diclofenac Sodium  4 g Topical 4x Daily Shannan A Sedora, CRNP      enoxaparin  1 mg/kg Subcutaneous Q12H Jesús Hannah MD      [START ON 2/16/2021] fluconazole  400 mg Oral Q24H Spencer Prescott MD      furosemide  40 mg Oral Daily Gayla Aguilar DO      glycopyrrolate  1 mg Oral TID Shannan A Rayoora, CRNP      insulin glargine  10 Units Subcutaneous HS Anjana Beckman MD      insulin lispro  1-5 Units Subcutaneous TID AC Anjana Beckman MD      insulin lispro  3 Units Subcutaneous TID With Meals Anjana Beckman MD      Lidocaine Viscous HCl  15 mL Swish & Spit 4x Daily PRN Shannan A Sedora, CRNP      lisinopril  10 mg Oral Daily Shannan A Sedora, CRNP      menthol-methyl salicylate   Apply externally 4x Daily PRN Shannan A Sedora, CRNP      SAMREEN ANTIFUNGAL  1 application Topical TID Sonia Mccall DO      multivitamin-minerals  1 tablet Oral Daily Shannan A Sedora, CRNP      omeprazole (PRILOSEC) suspension 2 mg/mL  20 mg Oral Early Morning Shannan A Sedora, CRNP      ondansetron  4 mg Intravenous Q6H PRN Toby Beyer PA-C      phenol  1 spray Mouth/Throat Q2H PRN Shannan A Sedora, CRNP      polyethylene glycol  17 g Oral Daily Shannan A Sedora, CRNP      senna  1 tablet Oral BID Shannan A Sedora, CRNP      sodium chloride  1 spray Each Nare Q1H PRN Karlie Page DO      sodium chloride  1,000 mL Intravenous Once Asha Ramirez MD          Today, Patient Was Seen By: Asha Ramirez MD    ** Please Note: Dictation voice to text software may have been used in the creation of this document   **

## 2021-02-15 NOTE — OCCUPATIONAL THERAPY NOTE
Occupational Therapy Treatment Note     02/15/21 0836   OT Last Visit   OT Visit Date 02/15/21   Note Type   Note Type Treatment   Restrictions/Precautions   Weight Bearing Precautions Per Order Yes   RUE Weight Bearing Per Order NWB   RLE Weight Bearing Per Order NWB  (S/P AKA)   Braces or Orthoses   (NONE)   General   Response to Previous Treatment Patient with no complaints from previous session   Lifestyle   Autonomy I ADLS/IADLS, transfers and functional mobility PTA   Reciprocal Relationships Pt lives w/ his siblings and nephews   Service to Others Pt works full time   Intrinsic Gratification Unable to report @ this time   Pain Assessment   Pain Assessment Tool FLACC   Pain Score No Pain   ADL   Where Assessed Edge of bed   Grooming Assistance 4  Minimal Assistance   Grooming Deficit Wash/dry hands; Wash/dry face   UB Bathing Assistance 3  Moderate Assistance   UB Bathing Deficit Chest;Right arm;Left arm; Abdomen   LB Bathing Assistance 2  Maximal Assistance   LB Bathing Deficit Buttocks;Right lower leg including foot   UB Dressing Assistance 2  Maximal Assistance   UB Dressing Deficit Thread RUE; Thread LUE;Pull around back   LB Dressing Assistance 2  Maximal Assistance   LB Dressing Deficit Don/doff R sock   Bed Mobility   Rolling R 3  Moderate assistance   Additional items Assist x 1   Rolling L 2  Maximal assistance   Additional items Assist x 1   Supine to Sit 3  Moderate assistance   Additional items Assist x 2   Sit to Supine 2  Maximal assistance   Additional items Assist x 2   Transfers   Sit to Stand 2  Maximal assistance   Additional items Assist x 2   Cognition   Overall Cognitive Status [facility]   Arousal/Participation Alert; Cooperative   Attention Within functional limits   Orientation Level Oriented to person;Oriented to place;Oriented to situation   Memory Within functional limits   Following Commands Follows one step commands with increased time or repetition   Comments pt is always pleasant and cooperative    Activity Tolerance   Activity Tolerance Patient limited by fatigue   Assessment   Assessment Pt seen for  participated in occupational therapy with focus on activity tolerance, bed mob, unsupported sitting balance and tolerance for pt engagement in functional self-care task/AM self-care  Pt cleared by RN/ Vivian for pt participation in therapy  Pt received HOB raised/supine pt sitting upright and known to OT from previous OT treatment sessions  Pt continues to required assist x 2 for bed mob and assist x 1 for unsupported sitting balance at edge of pt bed 2* pt deconditioning  Pt + for chest tube  Pt remains cooperative and motivated for participation  Pt noted for improved head and neck /posture control this session  Pt continues to required assist for functional transfers 2* pt decreased overall strength and balance  Pt will require post acute rehab services to continue to address these above noted pt deficits which currently impair pt ADL and functional mob  Pt bed alarm active post session all needs within reach     Plan   Treatment Interventions ADL retraining;Functional transfer training;Patient/family training   Goal Expiration Date 02/23/21   OT Treatment Day 7   OT Frequency 2-3x/wk   Recommendation   OT Discharge Recommendation Post-Acute Rehabilitation Services   AM-PAC Daily Activity Inpatient   Lower Body Dressing 1   Bathing 2   Toileting 1   Upper Body Dressing 1   Grooming 2   Eating 2   Daily Activity Raw Score 9   Turning Head Towards Sound 4   Follow Simple Instructions 3   Low Function Daily Activity Raw Score 16   Low Function Daily Activity Standardized Score 27 65   AM-PAC Applied Cognition Inpatient   Following a Speech/Presentation 2   Understanding Ordinary Conversation 3   Taking Medications 3   Remembering Where Things Are Placed or Put Away 3   Remembering List of 4-5 Errands 2   Taking Care of Complicated Tasks 2   Applied Cognition Raw Score 15   Applied Cognition Standardized Score 33 54   Barthel Index   Feeding 0   Bathing 0   Grooming Score 0   Dressing Score 0   Bladder Score 0   Bowels Score 0   Toilet Use Score 5   Transfers (Bed/Chair) Score 5   Mobility (Level Surface) Score 0   Stairs Score 0   Barthel Index Score 10   Modified Brayan Scale   Modified [place] Scale 4       Rena BROOKS/MATY

## 2021-02-15 NOTE — PROGRESS NOTES
Progress Note - Pulmonary   Quinton Older 56 y o  male MRN: [id number]  Unit/Bed#: [facility] 717-01 Encounter: [id number]      Assessment:     Acute hypoxic respiratory failure- secondary to moderate pneumothorax superimposed on bilateral ground-glass opacification related to prior COVID infection and hydropneumothorax ex vacuo  - s/p left 10F IR chest tube on 2/11 - continue suction -20   -  Exudative fluid  - cultures grew out few candida albicans - now on micafungin   - recommend ID consult   - overall output overnight 645cc, with 50cc this AM   - continue flush with normal saline 20cc Q6H which has not been documented since CT placement   - thoracic surgery following for possible trapped lung and any utility in decortication - no surgical intervention at present   - s/p tpa/dornase x 2 doses over weekend   - on 2lpm saturating 93%   - CXR appears to have improvement in PTX with persistent reticular changes    COVID-19 pneumonia  - initially diagnosed on 12/31/2020 and was asymptomatic  Returned on 01/11 with symptoms of SOB requiring intubation on 01/12/2021 and was extubated on 01/25/2021  History of tobacco abuse    Hepatitis-C    Right lower extremity emboli-status post embolectomy and below-the-knee amputation   - on therapeutic Lovenox       Subjective:   Patient seen/examined this AM  He feels much better, coughing but nothing comes up  No chest pain  Review of Systems   Respiratory: Positive for cough  All other systems reviewed and are negative        Objective:     Vitals:    02/15/21 0334 02/15/21 0430 02/15/21 0500 02/15/21 0738   BP: 94/52 (!) 89/54 97/54 (!) 87/59   BP Location: Left arm Left arm  Left arm   Pulse: 92 87 87 95   Resp:    (!) 24   Temp: 99 7 °F (37 6 °C)   98 8 °F (37 1 °C)   TempSrc:    Oral   SpO2:   95% 94%   Weight:       Height:               Intake/Output Summary (Last 24 hours) at 2/15/2021 0827  Last data filed at 2/15/2021 0744  Gross per 24 hour   Intake 940 ml Output 1485 ml   Net -545 ml         Physical Exam  Vitals signs reviewed  HENT:      Head: Normocephalic  Nose: Nose normal       Mouth/Throat:      Mouth: Mucous membranes are moist    Eyes:      Pupils: Pupils are equal, round, and reactive to light  Cardiovascular:      Rate and Rhythm: Normal rate and regular rhythm  Pulses: Normal pulses  Heart sounds: Normal heart sounds  Pulmonary:      Effort: Pulmonary effort is normal       Breath sounds: Normal breath sounds  Comments: Diminished breath sounds b/l  Abdominal:      General: Abdomen is flat  Palpations: Abdomen is soft  Musculoskeletal: Normal range of motion  Comments: RLE AKA    Skin:     General: Skin is warm  Neurological:      General: No focal deficit present  Mental Status: He is alert and oriented to person, place, and time  Labs: I have personally reviewed pertinent lab results  Results from last 7 days   Lab Units 02/15/21  0558 02/13/21 2036 02/12/21  0935   WBC Thousand/uL 9 76 15 91* 7 24   HEMOGLOBIN g/dL 8 0* 10 3* 8 0*   HEMATOCRIT % 27 0* 34 7* 27 1*   PLATELETS Thousands/uL 287 368 215      Results from last 7 days   Lab Units 02/15/21  0558 02/13/21 2036 02/11/21  0949   POTASSIUM mmol/L 3 4* 3 3* 3 7   CHLORIDE mmol/L 104 101 100   CO2 mmol/L 36* 37* 35*   BUN mg/dL 17 11 13   CREATININE mg/dL 0 49* 0 50* 0 40*   CALCIUM mg/dL 7 8* 8 9 8 0*     Results from last 7 days   Lab Units 02/15/21  0558 02/13/21 2036   MAGNESIUM mg/dL 2 0 1 5*                  0   Lab Value Date/Time    TROPONINI <0 02 02/13/2021 2036    Jayla Star <0 03 01/11/2021 1020    TROPONINI <0 03 01/06/2021 0557    TROPONINI <0 03 12/31/2020 1955    TROPONINI <0 02 05/21/2020 1517    TROPONINI <0 02 05/21/2020 1228       Microbiology:  Sputum with mixed maria de jesus   Pleural fluid negative to date     Imaging and other studies: I have personally reviewed pertinent reports     and I have personally reviewed pertinent films in PACS        Chintan [place] MD   Pulmonary & Critical Care Fellow  [facility]

## 2021-02-15 NOTE — CONSULTS
Consultation - Infectious Disease   Pauly Winchester 56 y o  male MRN: [id number]  Unit/Bed#: [facility] 717-01 Encounter: [id number]      IMPRESSION & RECOMMENDATIONS:   1  Left-sided candidal empyema-unclear primary process  No clear evidence of an esophageal perforation at least clinically and radiographically  Consideration for the possibility of a pneumonia with colonization of the airways with Candida and a spontaneous pneumothorax contaminating the pleural space  Consideration for the possibility of a transient fungemia which is now resolved  Fortunately the patient remains hemodynamically stable  He has undergone chest tube drainage and is receiving tPA installations   -discontinue micafungin  -fluconazole 400 mg p o  Q 24 hours  -chest tube drainage  -tPA instillation  -will discussed with Pulmonary need for any additional imaging to look for evidence of an esophageal event  -recheck CBC with diff and CMP    2  Fever-no recurrence over the past 48 hours  Possibly secondary to the fungal empyema  The blood cultures remain negative and the patient remains hemodynamically stable  He seems to be tolerating the antifungals without difficulty   -antifungals as above  -follow-up blood cultures  -no additional ID workup for now    3  COVID 19 pneumonia-status post the initiation of the severe treatment protocol  Patient has now completed that treatment  He did not receive any antivirals such as remdesivir or plasma due to the late phase of illness in which treatment was initiated  Is much improved clinically  -no additional COVID-19 directed treatment  -monitor respiratory status    4  Acute hypoxic respiratory failure-likely multifactorial including COVID-19 pneumonia, and now hydropneumothorax in the setting of presumptive candidal empyema  The patient is still requiring oxygen by nasal cannula  -O2 support  -wean off oxygen as able  -no additional ID workup for now    5   Acute right lower extremity ischemia-with suspected rhabdomyolysis  The patient is now status 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  He has been continuing on anticoagulation  No local wound cellulitis noted  -no additional directed antibiotics  -vascular follow-up  -local wound care    6  Diabetes mellitus-type 2 with long-term insulin use and hyperglycemia    Have discussed the above management plan in detail with the primary service    Extensive review of the medical records in epic including review of the notes, radiographs, and laboratory results     HISTORY OF PRESENT ILLNESS:  Reason for Consult:  Candidal empyema  HPI: Truong Baxter is a 56y o  year old male with drug abuse, diabetes mellitus, hypertension, admitted to [facility] in [place] on 1/13/21 for acute leg ischemia in the setting of critical illness with mental status changes and recent COVID-19 infection who I am asked to assist with management of candidal empyema  Patient apparently was 1st admitted to [facility] after the onset of nausea, vomiting, shortness of breath, mental status changes in the emergency department was quickly intubated and transferred to [facility] for further management  However while at [facility] the patient developed acute right lower extremity ischemia and was transferred to [address] [place] [address] in [place] for further management  He underwent emergent guillotine above-the-knee amputation and was started on vancomycin cefepime after cultures were obtained  He was felt to have evidence of progressive COVID-19 infection although his positive test was from a few weeks earlier, and therefore he was treated with the severe protocol with dexamethasone  His antibiotics were eventually de-escalated to ceftriaxone and doxycycline    However he had persistent fever and therefore his antibiotics were broadened once again to vanc and cefepime and he completed 5 more days of antibiotics and with the completion on 1/20/2021 he underwent formal above the knee amputation with closure  He was eventually deemed stable and eventually extubated weaned off all vasopressor support and transferred out of the intensive care unit  On 2/9/2021 he had a new fever spike that was relatively isolated  However the next few days he began having more consistent fever and had repeat chest x-ray that revealed a new left-sided hydropneumothorax  He underwent chest tube placement with evidence of a exit of fusion with some complexity based upon the loculations  He had chest tube placed and the cultures from the chest tube drainage grew Candida albicans  He was initially on vancomycin, cefepime, and Flagyl but micafungin was added  The antibacterials have been discontinued now the patient has been on just micafungin  He has been receiving tPA installations into the chest tube in attempt to free trapped lung and preventive requirement for decortication  The patient admits to having some chest pain intermittently but none currently  He denies any increased cough or shortness of breath, denies any nausea vomiting or diarrhea although yesterday he did have some nausea and vomiting  He is not having any dysuria or hematuria  REVIEW OF SYSTEMS:  A complete review of systems is negative other than that noted in the HPI  PAST MEDICAL HISTORY:  Past Medical History:   Diagnosis Date    Diabetes mellitus ([facility] [address] )     GERD (gastroesophageal reflux disease)     Hypercholesteremia     Hypertension     [facility]adone use ([facility] )      Past Surgical History:   Procedure Laterality Date    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    Procedure: AMPUTATION ABOVE KNEE (AKA);   Surgeon: Lesli Parra MD;  [facility]: [facility];  Service: Vascular    AMPUTATION ABOVE KNEE (AKA) Right 1/18/2021    Procedure: AMPUTATION ABOVE KNEE (AKA) FORMALIZATION,  R AKA wound washout, wound closure;  Surgeon: Lesli Parra MD; [facility]: [facility];  Service: Vascular    ARTERIOGRAM Right 2021    Procedure: ARTERIOGRAM;  Surgeon: Lou Terry DO;  [facility];  Service: Vascular    IR CHEST TUBE PLACEMENT  2/10/2021    IR LOWER EXTREMITY ANGIOGRAM  2021    THROMBECTOMY W/ EMBOLECTOMY Right 2021    Procedure: EMBOLECTOMY/THROMBECTOMY LOWER EXTREMITY;  Surgeon: Lou Terry DO;  [facility]: [facility];  Service: Vascular       FAMILY HISTORY:  Non-contributory    SOCIAL HISTORY:  Social History   Social History     Substance and Sexual Activity   Alcohol Use No     Social History     Substance and Sexual Activity   Drug Use No    Comment: methadone     Social History     Tobacco Use   Smoking Status Former Smoker   Smokeless Tobacco Never Used       ALLERGIES:  Allergies   Allergen Reactions    Varenicline        MEDICATIONS:  All current active medications have been reviewed    Antibiotics:  Micafungin 3    PHYSICAL EXAM:  Temp:  [98 5 °F (36 9 °C)-99 7 °F (37 6 °C)] 98 5 °F (36 9 °C)  HR:  [79-97] 90  Resp:  [18-25] 20  BP: ()/(46-59) 107/59  SpO2:  [92 %-98 %] 92 %  Temp (24hrs), Av 1 °F (37 3 °C), Min:98 5 °F (36 9 °C), Max:99 7 °F (37 6 °C)  Current: Temperature: 98 5 °F (36 9 °C)    Intake/Output Summary (Last 24 hours) at 2/15/2021 1710  Last data filed at 2/15/2021 1500  Gross per 24 hour   Intake 1103 33 ml   Output 1060 ml   Net 43 33 ml       General Appearance:  Appearing chronically ill, debilitated, nontoxic, and in no distress   Head:  Normocephalic, without obvious abnormality, atraumatic   Eyes:  Conjunctiva pale and sclera anicteric, both eyes   Nose: Nares normal, mucosa normal, no drainage   Throat: Oropharynx moist without lesions   Neck: Supple, symmetrical, no adenopathy, no tenderness/mass/nodules   Back:   Symmetric, no curvature, ROM normal, no CVA tenderness   Lungs:   Decreased breath sounds left greater than right, respirations unlabored   Chest Wall:  No tenderness or deformity  Left-sided chest tube in place  Heart:  RRR; no murmur, rub or gallop   Abdomen:   Soft, non-tender, non-distended, positive bowel sounds    Extremities: No cyanosis, clubbing  Right lower extremity status post above-the-knee amputation  Well-approximated incision without erythema or drainage  Groin incision with scant drainage with some surrounding faint erythema  Skin: No rashes or lesions  No draining wounds noted  Lymph nodes: Cervical, supraclavicular nodes normal   Neurologic: Alert and oriented times 3, extremity strength 5/5 and symmetric       LABS, IMAGING, & OTHER STUDIES:  Lab Results:  I have personally reviewed pertinent labs  Results from last 7 days   Lab Units 02/15/21  1358 02/15/21  0558 02/13/21 2036 02/12/21  0935   WBC Thousand/uL  --  9 76 15 91* 7 24   HEMOGLOBIN g/dL 7 2* 8 0* 10 3* 8 0*   PLATELETS Thousands/uL  --  287 368 215     Results from last 7 days   Lab Units 02/15/21  0558 02/13/21 2036 02/11/21  0949   SODIUM mmol/L 141 139 138   POTASSIUM mmol/L 3 4* 3 3* 3 7   CHLORIDE mmol/L 104 101 100   CO2 mmol/L 36* 37* 35*   BUN mg/dL 17 11 13   CREATININE mg/dL 0 49* 0 50* 0 40*   EGFR ml/min/1 73sq m 117 116 127   CALCIUM mg/dL 7 8* 8 9 8 0*     Results from last 7 days   Lab Units 02/14/21  0218 02/14/21  0217 02/10/21  1253   BLOOD CULTURE  No Growth at 24 hrs  No Growth at 24 hrs   --    GRAM STAIN RESULT   --   --  1+ Polys  No organisms seen   BODY FLUID CULTURE, STERILE   --   --  Few Colonies of Candida albicans*     Results from last 7 days   Lab Units 02/13/21 2036 02/11/21  0536 02/10/21  0642   PROCALCITONIN ng/ml 0 30* 0 27* 0 31*                   Imaging Studies:     Chest x-ray-moderate left pneumothorax  Extensive bilateral consolidation with ground-glass opacities      CT chest-moderate size left hydropneumothorax just partially loculated    Images personally reviewed by me in PACS

## 2021-02-15 NOTE — PLAN OF CARE
Problem: OCCUPATIONAL THERAPY ADULT  Goal: Performs self-care activities at highest level of function for planned discharge setting  See evaluation for individualized goals  Description: Treatment Interventions: ADL retraining, Functional transfer training, Patient/family training          See flowsheet documentation for full assessment, interventions and recommendations  Outcome: Progressing  Note: Limitation: Decreased ADL status, Decreased Safe judgement during ADL, Decreased UE strength, Decreased UE ROM, Decreased cognition, Decreased endurance, Decreased self-care trans, Decreased high-level ADLs, Decreased sensation, Decreased fine motor control, Visual deficit  Prognosis: Fair  Assessment: Pt seen for  participated in occupational therapy with focus on activity tolerance, bed mob, unsupported sitting balance and tolerance for pt engagement in functional self-care task/AM self-care  Pt cleared by RN/ Vivian for pt participation in therapy  Pt received HOB raised/supine pt sitting upright and known to OT from previous OT treatment sessions  Pt continues to required assist x 2 for bed mob and assist x 1 for unsupported sitting balance at edge of pt bed 2* pt deconditioning  Pt + for chest tube  Pt remains cooperative and motivated for participation  Pt noted for improved head and neck /posture control this session  Pt continues to required assist for functional transfers 2* pt decreased overall strength and balance  Pt will require post acute rehab services to continue to address these above noted pt deficits which currently impair pt ADL and functional mob  Pt bed alarm active post session all needs within reach  OT Discharge Recommendation: Post-Acute Rehabilitation Services  OT - OK to Discharge:  Yes

## 2021-02-15 NOTE — PLAN OF CARE
Problem: PHYSICAL THERAPY ADULT  Goal: Performs mobility at highest level of function for planned discharge setting  See evaluation for individualized goals  Description: Treatment/Interventions: Functional transfer training, LE strengthening/ROM, Therapeutic exercise, Endurance training, Bed mobility          See flowsheet documentation for full assessment, interventions and recommendations  Note: Prognosis: Fair  Problem List: Decreased strength, Decreased endurance, Impaired balance, Decreased mobility, Decreased skin integrity  Assessment: PT INITIATED TREATMENT SESSION IN ORDER TO ASSIST PATIENT IN ACHIEVING GOALS TO IMPROVE OVERALL ACTIVITY TOLERANCE, SITTING BALANCE, AND TRANSFERS AS APPROPRIATE  PATIENT LETHARGIC HOWEVER MOTIVATED/COOPERATIVE  + CHEST TUBE, SUPPLEMENTAL O2, AND HR/O2 MONITORING  FUNCTIONAL MOBILITY REMAINS LIMITED BY GROSS WEAKNESS  2-/5 R UE, 3-/5 L UE, 3+/5 R LE, L LE S/P AKA  WITH DECREASED ABILITY TO UTILIZE B/L UE TO ASSIST SECONDARY TO WEAKNESS, PATIENT REQUIRES MIN/MOD-AX1 TO MAINTAIN STATIC/DYNAMIC SITTING BALANCE EOB  TOLERATED 25 MINUTES TODAY DURING PERFORMANCE OF HYGIENE TASKS  UNABLE TO PERFORM LATERAL SIDE SCOOTING SECONDARY TO WEAKNESS  ATTEMPTED SIT-->STAND TRANSFER ON R LE HOWEVER PATIENT UNABLE TO CLEAR BUTTOCK FROM BED WITH MAX-AX2  SUPINE IN BED PATIENT REQUIRED MULTIPLE PERFORMANCE OF ROLLING FOR CYNTHIA-HYGIENE CARE WITH MOD/MAX ASSISTANCE  THROUGHOUT TREATMENT SESSION PATIENT REQUIRED BOTH VERBAL AND TACTILE CUING TO IMPROVE SAFETY, EFFICIENCY, AND MECHANICS OF MOBILITY IN ADDITION TO HANDS ON ASSISTANCE FOR ALL ASPECTS OF FUNCTIONAL MOBILITY  ADDITIONALLY, HE REQUIRED INCREASED TIME TO EXECUTE SPECIFIC MOBILITY TASKS WITH REST BREAKS IN BETWEEN SECONDARY TO GROSS FATIGUE AND WEAKNESS  PT D/C RECOMMENDATION REMAINS FOR REHAB  PATIENT WILL BENEFIT FROM CONTINUED SKILLED PT THIS ADMISSION TO ACHIEVE MAXIMAL FUNCTION AND SAFETY    Barriers to Discharge: Inaccessible home environment, Decreased caregiver support     PT Discharge Recommendation: [address]     PT - OK to Discharge: Yes(TO REHAB WHEN MED OMAR )    See flowsheet documentation for full assessment

## 2021-02-15 NOTE — ASSESSMENT & PLAN NOTE
Chronic methadone use  Methadone discontinue this morning because lethargy  Apparently every morning after patient gets methadone has been very drowsy lethargic

## 2021-02-15 NOTE — ASSESSMENT & PLAN NOTE
Most likely toxic metabolic due to methadone and gabapentin  With oral was discontinued  Gabapentin dose decreased  Patient more awake and alert later in afternoon  For now continue to hold methadone  Gabapentin dose decreased to 100 mg t i d

## 2021-02-15 NOTE — NURSING NOTE
Notified CT surgery resident, Dr Yoel Groves, that pt was noted to have an air leak in his chest tube with new development of crepitus around his dressing  Pt had episodes x2 of hypoxia that required more oxygen nasal cannula after vomiting x2 this evening  Dr Yoel Groves came to evaluate patient and ordered a CXR  Pt's oxygen status returned to his baseline of 4L nasal cannula  Will continue to monitor patient

## 2021-02-15 NOTE — ASSESSMENT & PLAN NOTE
Patient had a fever of 101 8 previous night and 100 8 after that  None since then  Otherwise asymptomatic  No evidence of pneumonia on x-ray  Pleural fluid cultures growing Candida  Patient started on anti fungal per pulmonology  Continue micafungin for now

## 2021-02-15 NOTE — PHYSICAL THERAPY NOTE
PT TREATMENT       02/15/21 0835   PT Last Visit   PT Visit Date 02/15/21   Note Type   Note Type Treatment   Pain Assessment   Pain Assessment Tool FLACC   Pain Location/Orientation Orientation: Right;Location: Leg   Pain Rating: FLACC (Rest) - Face 0   Pain Rating: FLACC (Rest) - Legs 0   Pain Rating: FLACC (Rest) - Activity 0   Pain Rating: FLACC (Rest) - Cry 0   Pain Rating: FLACC (Rest) - Consolability 0   Score: FLACC (Rest) 0   Pain Rating: FLACC (Activity) - Face 1   Pain Rating: FLACC (Activity) - Legs 0   Pain Rating: FLACC (Activity) - Activity 0   Pain Rating: FLACC (Activity) - Cry 0   Pain Rating: FLACC (Activity) - Consolability 0   Score: FLACC (Activity) 1   Restrictions/Precautions   Weight Bearing Precautions Per Order Yes   RLE Weight Bearing Per Order NWB  (S/P AKA)   Braces or Orthoses   (NONE)   Other Precautions Fall Risk;O2;Telemetry;Multiple lines;WBS; Bed Alarm; Chair Alarm  (L SIDED CHEST TUBE; O2 4 L; BED ALARM ACTIVE POST TREAT )   General   Chart Reviewed Yes   Response to Previous Treatment Patient with no complaints from previous session  Family/Caregiver Present No   Cognition   Arousal/Participation Alert; Cooperative   Attention Within functional limits   Orientation Level Oriented to person;Oriented to place;Oriented to situation   Following Commands Follows one step commands with increased time or repetition   Comments PLEASANT, MOTIVATED   Subjective   Subjective "ARE YOU COMING TOMORROW?"   Bed Mobility   Rolling R 3  Moderate assistance   Additional items Assist x 1; Increased time required;LE management;Verbal cues   Rolling L 2  Maximal assistance   Additional items Assist x 1; Increased time required;Verbal cues;LE management   Supine to Sit 3  Moderate assistance   Additional items Assist x 2; Increased time required;LE management;HOB elevated   Sit to Supine 2  Maximal assistance   Additional items Assist x 2; Increased time required;Verbal cues   Additional Comments PATIENT PERFORMED SUPINE-->SIT TRANSFER TOWARD R SIDE OF BED   Transfers   Sit to Stand 2  Maximal assistance  (ATTEMPTED WITH MAX-AX2 HOWEVER UNABLE TO CLEAR BUTT FROM BED)   Additional items Assist x 2   Additional Comments PATIENT UNABLE TO ACHIEVE STANDING ON R LE WITH B/L HHA AND R KNEE BLOCK  Balance   Static Sitting Poor +  (MIN-AX1 X 5 MINUTES )   Dynamic Sitting Poor  (MOD-AX1 X 20 MINUTES )   Endurance Deficit   Endurance Deficit Yes   Endurance Deficit Description GROSS WEAKNESS (B/L UE > B/L LE); 4 L O2 (89%-96% THROUGHOUT TREATMENT SESSION    Activity Tolerance   Activity Tolerance Patient tolerated treatment well;Patient limited by fatigue   Medical Staff [address]; CM [address] O[address] TO SEE PER RN SILVERIO    Assessment   Prognosis Fair   Problem List Decreased strength;Decreased endurance; Impaired balance;Decreased mobility; Decreased skin integrity   Assessment PT INITIATED TREATMENT SESSION IN ORDER TO ASSIST PATIENT IN ACHIEVING GOALS TO IMPROVE OVERALL ACTIVITY TOLERANCE, SITTING BALANCE, AND TRANSFERS AS APPROPRIATE  PATIENT LETHARGIC HOWEVER MOTIVATED/COOPERATIVE  + CHEST TUBE, SUPPLEMENTAL O2, AND HR/O2 MONITORING  FUNCTIONAL MOBILITY REMAINS LIMITED BY GROSS WEAKNESS  2-/5 R UE, 3-/5 L UE, 3+/5 R LE, L LE S/P AKA  WITH DECREASED ABILITY TO UTILIZE B/L UE TO ASSIST SECONDARY TO WEAKNESS, PATIENT REQUIRES MIN/MOD-AX1 TO MAINTAIN STATIC/DYNAMIC SITTING BALANCE EOB  TOLERATED 25 MINUTES TODAY DURING PERFORMANCE OF HYGIENE TASKS  UNABLE TO PERFORM LATERAL SIDE SCOOTING SECONDARY TO WEAKNESS  ATTEMPTED SIT-->STAND TRANSFER ON R LE HOWEVER PATIENT UNABLE TO CLEAR BUTTOCK FROM BED WITH MAX-AX2  SUPINE IN BED PATIENT REQUIRED MULTIPLE PERFORMANCE OF ROLLING FOR CYNTHIA-HYGIENE CARE WITH MOD/MAX ASSISTANCE   THROUGHOUT TREATMENT SESSION PATIENT REQUIRED BOTH VERBAL AND TACTILE CUING TO IMPROVE SAFETY, EFFICIENCY, AND MECHANICS OF MOBILITY IN ADDITION TO HANDS ON ASSISTANCE FOR ALL ASPECTS OF FUNCTIONAL MOBILITY  ADDITIONALLY, HE REQUIRED INCREASED TIME TO EXECUTE SPECIFIC MOBILITY TASKS WITH REST BREAKS IN BETWEEN SECONDARY TO GROSS FATIGUE AND WEAKNESS  PT D/C RECOMMENDATION REMAINS FOR REHAB  PATIENT WILL BENEFIT FROM CONTINUED SKILLED PT THIS ADMISSION TO ACHIEVE MAXIMAL FUNCTION AND SAFETY  Goals   Patient Goals TO GET BETTER   STG Expiration Date 02/26/21   PT Treatment Day 5   Plan   Treatment/Interventions LE strengthening/ROM; Therapeutic exercise; Endurance training;Patient/family training;Equipment eval/education; Bed mobility;OT;Spoke to nursing;Spoke to case management   Progress Progressing toward goals   PT Frequency Other (Comment)  (3-5X/WK)   Recommendation   PT Discharge Recommendation Post-Acute Rehabilitation Services   Equipment Recommended Wheelchair   PT - OK to Discharge Yes  (TO REHAB WHEN MED OMAR )   Angeline Guillen [address]   Turning in Bed Without Bedrails 2   Lying on Back to Sitting on Edge of Flat Bed 1   Moving Bed to Chair 1   Standing Up From Chair 1   Walk in Room 1   Climb 3-5 Stairs 1   Basic Mobility Inpatient Raw Score 7     The patient's AM-PAC Basic Mobility Inpatient Short Form Raw Score is 7  A standardized score less than 42 9 suggests the patient may benefit from discharge to post-acute rehabilitation services  Please also refer to the recommendation of the Physical Therapist for safe discharge planning      DEREK SARABIA PT, DPT

## 2021-02-15 NOTE — ASSESSMENT & PLAN NOTE
Status post chest tube placement  Currently connected to suction  Patient's respiratory status has improved  Oxygen requirements back to 2-3 L  Pulmonology and thoracic surgery following  Chest x-ray shows persistent hydropneumothorax    Plan for  tPA and dornase per thoracic surgery team

## 2021-02-15 NOTE — RESTORATIVE TECHNICIAN NOTE
Restorative Specialist Mobility Note       Activity: Other (Comment)(Attempted to see pt, pt is currently on hold for activity per RN Vivian )       Zackary Woods BS, Restorative Technician,

## 2021-02-16 ENCOUNTER — APPOINTMENT (INPATIENT)
Dept: RADIOLOGY | Facility: HOSPITAL | Age: 61
DRG: 853 | End: 2021-02-16
Payer: COMMERCIAL

## 2021-02-16 LAB
ALBUMIN SERPL BCP-MCNC: 1.2 G/DL (ref 3.5–5)
ALP SERPL-CCNC: 80 U/L (ref 46–116)
ALT SERPL W P-5'-P-CCNC: 10 U/L (ref 12–78)
ANION GAP SERPL CALCULATED.3IONS-SCNC: 4 MMOL/L (ref 4–13)
AST SERPL W P-5'-P-CCNC: 13 U/L (ref 5–45)
BASOPHILS # BLD AUTO: 0.04 THOUSANDS/ΜL (ref 0–0.1)
BASOPHILS NFR BLD AUTO: 1 % (ref 0–1)
BILIRUB SERPL-MCNC: 0.73 MG/DL (ref 0.2–1)
BUN SERPL-MCNC: 16 MG/DL (ref 5–25)
CALCIUM ALBUM COR SERPL-MCNC: 9.9 MG/DL (ref 8.3–10.1)
CALCIUM SERPL-MCNC: 7.7 MG/DL (ref 8.3–10.1)
CHLORIDE SERPL-SCNC: 101 MMOL/L (ref 100–108)
CO2 SERPL-SCNC: 32 MMOL/L (ref 21–32)
CREAT SERPL-MCNC: 0.46 MG/DL (ref 0.6–1.3)
EOSINOPHIL # BLD AUTO: 0.11 THOUSAND/ΜL (ref 0–0.61)
EOSINOPHIL NFR BLD AUTO: 2 % (ref 0–6)
ERYTHROCYTE [DISTWIDTH] IN BLOOD BY AUTOMATED COUNT: 15.9 % (ref 11.6–15.1)
GFR SERPL CREATININE-BSD FRML MDRD: 120 ML/MIN/1.73SQ M
GLUCOSE SERPL-MCNC: 106 MG/DL (ref 65–140)
GLUCOSE SERPL-MCNC: 121 MG/DL (ref 65–140)
GLUCOSE SERPL-MCNC: 149 MG/DL (ref 65–140)
GLUCOSE SERPL-MCNC: 92 MG/DL (ref 65–140)
GLUCOSE SERPL-MCNC: 93 MG/DL (ref 65–140)
HCT VFR BLD AUTO: 25.4 % (ref 36.5–49.3)
HCT VFR BLD AUTO: 26.8 % (ref 36.5–49.3)
HCT VFR BLD AUTO: 27.9 % (ref 36.5–49.3)
HGB BLD-MCNC: 7.7 G/DL (ref 12–17)
HGB BLD-MCNC: 8.1 G/DL (ref 12–17)
HGB BLD-MCNC: 8.2 G/DL (ref 12–17)
IMM GRANULOCYTES # BLD AUTO: 0.05 THOUSAND/UL (ref 0–0.2)
IMM GRANULOCYTES NFR BLD AUTO: 1 % (ref 0–2)
LYMPHOCYTES # BLD AUTO: 0.81 THOUSANDS/ΜL (ref 0.6–4.47)
LYMPHOCYTES NFR BLD AUTO: 11 % (ref 14–44)
MCH RBC QN AUTO: 28.3 PG (ref 26.8–34.3)
MCHC RBC AUTO-ENTMCNC: 30.3 G/DL (ref 31.4–37.4)
MCV RBC AUTO: 93 FL (ref 82–98)
MONOCYTES # BLD AUTO: 0.62 THOUSAND/ΜL (ref 0.17–1.22)
MONOCYTES NFR BLD AUTO: 9 % (ref 4–12)
NEUTROPHILS # BLD AUTO: 5.58 THOUSANDS/ΜL (ref 1.85–7.62)
NEUTS SEG NFR BLD AUTO: 76 % (ref 43–75)
NRBC BLD AUTO-RTO: 0 /100 WBCS
PLATELET # BLD AUTO: 261 THOUSANDS/UL (ref 149–390)
PMV BLD AUTO: 10.2 FL (ref 8.9–12.7)
POTASSIUM SERPL-SCNC: 3.6 MMOL/L (ref 3.5–5.3)
PROT SERPL-MCNC: 6.5 G/DL (ref 6.4–8.2)
RBC # BLD AUTO: 2.72 MILLION/UL (ref 3.88–5.62)
SODIUM SERPL-SCNC: 137 MMOL/L (ref 136–145)
WBC # BLD AUTO: 7.21 THOUSAND/UL (ref 4.31–10.16)

## 2021-02-16 PROCEDURE — 94668 MNPJ CHEST WALL SBSQ: CPT

## 2021-02-16 PROCEDURE — 85018 HEMOGLOBIN: CPT | Performed by: INTERNAL MEDICINE

## 2021-02-16 PROCEDURE — 94760 N-INVAS EAR/PLS OXIMETRY 1: CPT

## 2021-02-16 PROCEDURE — 85025 COMPLETE CBC W/AUTO DIFF WBC: CPT | Performed by: INTERNAL MEDICINE

## 2021-02-16 PROCEDURE — 93005 ELECTROCARDIOGRAM TRACING: CPT

## 2021-02-16 PROCEDURE — 99232 SBSQ HOSP IP/OBS MODERATE 35: CPT | Performed by: INTERNAL MEDICINE

## 2021-02-16 PROCEDURE — 99232 SBSQ HOSP IP/OBS MODERATE 35: CPT | Performed by: THORACIC SURGERY (CARDIOTHORACIC VASCULAR SURGERY)

## 2021-02-16 PROCEDURE — 82948 REAGENT STRIP/BLOOD GLUCOSE: CPT

## 2021-02-16 PROCEDURE — 85014 HEMATOCRIT: CPT | Performed by: INTERNAL MEDICINE

## 2021-02-16 PROCEDURE — 92611 MOTION FLUOROSCOPY/SWALLOW: CPT

## 2021-02-16 PROCEDURE — 80053 COMPREHEN METABOLIC PANEL: CPT | Performed by: INTERNAL MEDICINE

## 2021-02-16 PROCEDURE — 74230 X-RAY XM SWLNG FUNCJ C+: CPT

## 2021-02-16 RX ORDER — OXYCODONE HYDROCHLORIDE 5 MG/1
2.5 TABLET ORAL ONCE
Status: COMPLETED | OUTPATIENT
Start: 2021-02-16 | End: 2021-02-16

## 2021-02-16 RX ORDER — METHADONE HYDROCHLORIDE 10 MG/1
70 TABLET ORAL DAILY
Status: DISCONTINUED | OUTPATIENT
Start: 2021-02-16 | End: 2021-02-25 | Stop reason: HOSPADM

## 2021-02-16 RX ADMIN — METHADONE HYDROCHLORIDE 70 MG: 10 TABLET ORAL at 12:01

## 2021-02-16 RX ADMIN — GLYCOPYRROLATE 1 MG: 1 TABLET ORAL at 17:40

## 2021-02-16 RX ADMIN — ENOXAPARIN SODIUM 70 MG: 80 INJECTION SUBCUTANEOUS at 08:47

## 2021-02-16 RX ADMIN — Medication 2000 UNITS: at 08:46

## 2021-02-16 RX ADMIN — MICONAZOLE NITRATE 1 APPLICATION: 20 CREAM TOPICAL at 22:15

## 2021-02-16 RX ADMIN — POLYETHYLENE GLYCOL 3350 17 G: 17 POWDER, FOR SOLUTION ORAL at 08:45

## 2021-02-16 RX ADMIN — INSULIN GLARGINE 10 UNITS: 100 INJECTION, SOLUTION SUBCUTANEOUS at 22:34

## 2021-02-16 RX ADMIN — DICLOFENAC SODIUM 4 G: 10 GEL TOPICAL at 08:45

## 2021-02-16 RX ADMIN — SENNOSIDES 8.6 MG: 8.6 TABLET, FILM COATED ORAL at 08:47

## 2021-02-16 RX ADMIN — DICLOFENAC SODIUM 4 G: 10 GEL TOPICAL at 17:39

## 2021-02-16 RX ADMIN — ENOXAPARIN SODIUM 70 MG: 80 INJECTION SUBCUTANEOUS at 22:15

## 2021-02-16 RX ADMIN — MICONAZOLE NITRATE 1 APPLICATION: 20 CREAM TOPICAL at 08:45

## 2021-02-16 RX ADMIN — OXYCODONE HYDROCHLORIDE 2.5 MG: 5 TABLET ORAL at 04:06

## 2021-02-16 RX ADMIN — DICLOFENAC SODIUM 4 G: 10 GEL TOPICAL at 22:16

## 2021-02-16 RX ADMIN — FLUCONAZOLE 400 MG: 200 TABLET ORAL at 08:47

## 2021-02-16 RX ADMIN — GLYCOPYRROLATE 1 MG: 1 TABLET ORAL at 22:16

## 2021-02-16 RX ADMIN — LISINOPRIL 10 MG: 10 TABLET ORAL at 08:46

## 2021-02-16 RX ADMIN — DICLOFENAC SODIUM 4 G: 10 GEL TOPICAL at 11:47

## 2021-02-16 RX ADMIN — ACETAMINOPHEN 650 MG: 325 TABLET, FILM COATED ORAL at 22:15

## 2021-02-16 RX ADMIN — Medication 20 MG: at 05:32

## 2021-02-16 RX ADMIN — Medication 1 TABLET: at 08:46

## 2021-02-16 RX ADMIN — FUROSEMIDE 40 MG: 40 TABLET ORAL at 08:47

## 2021-02-16 RX ADMIN — MICONAZOLE NITRATE 1 APPLICATION: 20 CREAM TOPICAL at 17:44

## 2021-02-16 RX ADMIN — GLYCOPYRROLATE 1 MG: 1 TABLET ORAL at 08:47

## 2021-02-16 NOTE — ASSESSMENT & PLAN NOTE
· Extubated 1/25 and required NGT for nutrition   · Speech therapy following   · S/p barium swallow  · Continue modified diet  · Continue robinul  · S/p ENT consult, no vocal cord motion impairment  · Status post video barium swallow on 02/16

## 2021-02-16 NOTE — ASSESSMENT & PLAN NOTE
Chronic methadone use  Methadone was held yesterday due to lethargy yesterday  Restarted methadone today as patient requested due to significant limb pain  And he is on chronic methadone therapy  Will continue to hold gabapentin for now to avoid over sedation and lethargy  Daily EKG for QTC monitoring while on methadone and fluconazole

## 2021-02-16 NOTE — ASSESSMENT & PLAN NOTE
Lab Results   Component Value Date    HGBA1C 11 6 (H) 12/09/2020       Recent Labs     02/15/21  1633 02/15/21  2053 02/16/21  0647 02/16/21  1105   POCGLU 159* 133 149* 106       Blood Sugar Average: Last 72 hrs:  (P) 151 [id number]     · Continue Lantus 10 units with 3 units Humalog t i d  With meals  Continue sliding scale

## 2021-02-16 NOTE — PROGRESS NOTES
Progress Note - Pulmonary   August Bio 56 y o  male MRN: [id number]  Unit/Bed#: [facility] 717-01 Encounter: [id number]      Assessment:     Acute hypoxic respiratory failure- secondary to moderate pneumothorax superimposed on bilateral ground-glass opacification related to prior COVID infection and hydropneumothorax ex vacuo  - s/p left 10F IR chest tube on 2/11 - continue suction -20   -  Exudative fluid  - cultures grew out few candida albicans - now on fluconazole as per ID   - overall output overnight 150cc  - continue flush with normal saline 20cc Q6H   - thoracic surgery following for possible trapped lung and decortication - no surgical intervention at present likely outpatient   - s/p tpa/dornase x 2 doses over weekend   - off oxygen   - CXR appears to have improvement in PTX with persistent reticular changes on 2/15   - repeat CXR pending for today to determine placement on water seal   - swallow study to check for occult aspiration vs fistula   - add flutter valve for productive cough     COVID-19 pneumonia  - initially diagnosed on 12/31/2020 and was asymptomatic  Returned on 01/11 with symptoms of SOB requiring intubation on 01/12/2021 and was extubated on 01/25/2021  History of tobacco abuse    Hepatitis-C    Right lower extremity emboli-status post embolectomy and below-the-knee amputation   - on therapeutic Lovenox       Subjective:   Patient seen/examined this AM  He feels much better, coughing but nothing comes up  No chest pain  Review of Systems   Respiratory: Positive for cough  All other systems reviewed and are negative        Objective:     Vitals:    02/15/21 2127 02/15/21 2144 02/16/21 0535 02/16/21 0845   BP: 101/86 114/63  120/59   BP Location:  Right arm  Right arm   Pulse:  85  96   Resp:  20  20   Temp:  98 4 °F (36 9 °C)  98 4 °F (36 9 °C)   TempSrc:  Oral  Oral   SpO2:    96%   Weight:   63 7 kg (140 lb 6 9 oz)    Height:               Intake/Output Summary (Last 24 hours) at 2/16/2021 1123  Last data filed at 2/16/2021 0501  Gross per 24 hour   Intake 1240 ml   Output 350 ml   Net 890 ml         Physical Exam  Vitals signs reviewed  HENT:      Head: Normocephalic  Nose: Nose normal       Mouth/Throat:      Mouth: Mucous membranes are moist    Eyes:      Pupils: Pupils are equal, round, and reactive to light  Cardiovascular:      Rate and Rhythm: Normal rate and regular rhythm  Pulses: Normal pulses  Heart sounds: Normal heart sounds  Pulmonary:      Effort: Pulmonary effort is normal       Breath sounds: Rhonchi present  Comments: Diminished breath sounds b/l  Abdominal:      General: Abdomen is flat  Palpations: Abdomen is soft  Musculoskeletal: Normal range of motion  Comments: RLE AKA    Skin:     General: Skin is warm  Neurological:      General: No focal deficit present  Mental Status: He is alert and oriented to person, place, and time  Labs: I have personally reviewed pertinent lab results    Results from last 7 days   Lab Units 02/16/21  0532 02/15/21  2022 02/15/21  1358 02/15/21  0558 02/13/21 2036   WBC Thousand/uL 7 21  --   --  9 76 15 91*   HEMOGLOBIN g/dL 7 7* 7 4* 7 2* 8 0* 10 3*   HEMATOCRIT % 25 4* 25 4* 24 2* 27 0* 34 7*   PLATELETS Thousands/uL 261  --   --  287 368   NEUTROS PCT % 76*  --   --   --   --    MONOS PCT % 9  --   --   --   --       Results from last 7 days   Lab Units 02/16/21  0532 02/15/21  0558 02/13/21 2036   POTASSIUM mmol/L 3 6 3 4* 3 3*   CHLORIDE mmol/L 101 104 101   CO2 mmol/L 32 36* 37*   BUN mg/dL 16 17 11   CREATININE mg/dL 0 46* 0 49* 0 50*   CALCIUM mg/dL 7 7* 7 8* 8 9   ALK PHOS U/L 80  --   --    ALT U/L 10*  --   --    AST U/L 13  --   --      Results from last 7 days   Lab Units 02/15/21  0558 02/13/21 2036   MAGNESIUM mg/dL 2 0 1 5*                  0   Lab Value Date/Time    TROPONINI <0 02 02/13/2021 2036    Darrian Low <0 03 01/11/2021 1020    TROPONINI <0 03 01/06/2021 0557 Suzanna Mars <0 03 12/31/2020 1955    TROPONINI <0 02 05/21/2020 1517    TROPONINI <0 02 05/21/2020 1228       Microbiology:  Sputum with mixed maria de jesus   Pleural fluid with candida albicans     Imaging and other studies: I have personally reviewed pertinent reports     and I have personally reviewed pertinent films in PACS        Omar Pond MD   Pulmonary & Critical Care Fellow  [facility]

## 2021-02-16 NOTE — PROCEDURES
Video Swallow Study      Patient Name: Debo Eddy  YPBGZ'H Date: 2/16/2021        Past Medical History  Past Medical History:   Diagnosis Date    Diabetes mellitus ([facility] [address] )     GERD (gastroesophageal reflux disease)     Hypercholesteremia     Hypertension     Methadone use ([facility] )         Past Surgical History  Past Surgical History:   Procedure Laterality Date    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    Procedure: AMPUTATION ABOVE KNEE (AKA); Surgeon: Don Raygoza MD;  [facility];  Service: Vascular    AMPUTATION ABOVE KNEE (AKA) Right 1/18/2021    Procedure: AMPUTATION ABOVE KNEE (AKA) FORMALIZATION,  R AKA wound washout, wound closure;  Surgeon: Don Raygoza MD;  [facility];  Service: Vascular    ARTERIOGRAM Right 1/13/2021    Procedure: ARTERIOGRAM;  Surgeon: Lou Terry DO;  [facility];  Service: Vascular    IR CHEST TUBE PLACEMENT  2/10/2021    IR LOWER EXTREMITY ANGIOGRAM  1/14/2021    THROMBECTOMY W/ EMBOLECTOMY Right 1/13/2021    Procedure: EMBOLECTOMY/THROMBECTOMY LOWER EXTREMITY;  Surgeon: Lou Terry DO;  [facility];  Service: Vascular     Video Barium Swallow Study    Summary:  Images are on PACS for review  Pt presents w/ mild oral, mild pharyngeal dysphagia sharath by prolonged manipulation & labored transfers, reduced hyolaryngeal elevation & at times poor pharyngeal constriction w/ thin in larger amts  There is mild penetration w/ the thin during the swallow w/ cup sips however he is able to take small straw sips w/ prep set & effortful swallows w/o any penetration or aspiration  There was no other aspiration event noted  Recommendations:  Diet:continue level 2  Liquids: continue nectar for now  Meds: crush or whole in puree  Strategies: slp will trial pt w/ thin w/ prep set & effortful swallows     Upright position  F/u ST tx: yes   Therapy Prognosis: fair  Full Supervision  Aspiration Precautions    Results reviewed with: pt, nursing  Aspiration precautions posted  If a dedicated assessment of the esophagus is desired, consider esophagram/barium swallow or EGD  Patient's goal:  Can I have real water? Goals:  Pt will tolerate least restrictive diet w/out s/s aspiration or oral/pharyngeal difficulties  Previous VBS:    2/2/2021Summary:  Images are on PACS for review  Pt presents w/ mod oral, mild/mod pharyngeal dysphagia sharath by reduced bolus manipulation & trnasfers w/ solids, loss prior to swallows, varied delay w/ weak hyolaryngeal rise & risk for aspiration w/ deep spill w/ thin  NO immed aspiration noted on today's study  The pt has difficulty maintaining his posture for safe intake & will need repositioning often w/ po  Recommendations:  Diet: puree to begin   Liquids: nectar thick to begin   Meds: crush in puree for now  Strategies: small bites, sips w/ a cued 2* swallow   Upright position  F/u ST tx:yes  Therapy Prognosis:   Prognosis considerations:medical condition   Full Supervision  Current Diet:   Level 2 w/ nt  Premorbid diet:  Regular/soft prior to admit w/ thin   Dentition:  Edentulous   O2 requirement:  RA  Oral mech:  Strength and ROM: improved since admit    Vocal Quality/Speech:  Harsh, breathy  Cognitive status:  A&O    Consistencies administered: Barium laden applesauce, banana,  honey thick, nectar thick, thin liquids, 13mm barium pill inpuree  Liquids were administered by cup and straw  Pt was seated laterally at 90 degrees  Oral stage:    Lip closure: wfl   Mastication: mild/mod prolonged w/ the soft material     Bolus formation: wfl   Bolus control: fair   Transfer: mildly reduced lingual retraction w/ puree/soft   Residue: min BOT residue w/ the puree/soft     Pharyngeal stage:    Swallow promptness: min delayed for all, at times will orally transfer & needs cues to swallow  Spill to valleculae:with ht, nt, thin    Noted spill over the tip of the epiglottis w/ the puree/pill   Spill to pyriforms: with thin by cup  Cued to use prep set w/ straw sip thin to eliminate  Epiglottic inversion: fair for all material   Laryngeal excursion: reduced anterior movement   Pharyngeal constriction: wfl   Vallecular retention: min to trace w/ puree, min w/ solids  Pyriform retention:  PPW coating:  Osteophytes:-  CP prominence: min tight w/ min retention but not a gross amt  Retropulsion from prominence:  Transient penetration:-  Epiglottic undercoat: mild w/   Penetration: mild during the swallow w/ cup sip thin  Aspiration: no gross aspiration noted  Strategies: used prep set with chin down for straw sips thin- able to do so w/o any penetration    Screening of Esophageal stage:    Dysmotility: noted throughout  Thin min cleared the esophagus  Pill min lodged at the distal esophagus

## 2021-02-16 NOTE — PROGRESS NOTES
Progress Note - Infectious Disease   James Gutierrez 56 y o  male MRN: [id number]  Unit/Bed#: [facility] 717-01 Encounter: [id number]      Impression/Plan:  1  Left-sided candidal empyema-unclear primary process  No clear evidence of an esophageal perforation at least clinically and radiographically  Consideration for the possibility of a pneumonia with colonization of the airways with Candida and a spontaneous pneumothorax contaminating the pleural space  Consideration for the possibility of a transient fungemia which is now resolved  Fortunately the patient remains hemodynamically stable  He has undergone chest tube drainage and is receiving tPA installations  He has clinically improved  -continue fluconazole  -if unable to tolerate p o  Can change to intravenous   -chest tube drainage  -tPA instillation  -check barium swallow to make sure no lead  -recheck CBC with diff and CMP     2  Fever-suspect all secondary to the fungal empyema  The blood cultures remain negative and the patient remains hemodynamically stable  He seems to be tolerating the antifungals without difficulty  No recurrence   -antifungals as above  -follow-up blood cultures  -no additional ID workup for now     3  COVID 19 pneumonia-status post the initiation of the severe treatment protocol  Patient has now completed that treatment  He did not receive any antivirals such as remdesivir or plasma due to the late phase of illness in which treatment was initiated  Is much improved clinically  -no additional COVID-19 directed treatment  -monitor respiratory status     4  Acute hypoxic respiratory failure-likely multifactorial including COVID-19 pneumonia, and now hydropneumothorax in the setting of presumptive candidal empyema  The patient is still requiring oxygen by nasal cannula  -O2 support  -wean off oxygen as able  -no additional ID workup for now     5  Acute right lower extremity ischemia-with suspected rhabdomyolysis    The patient is now status 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  He has been continuing on anticoagulation  No local wound cellulitis noted  -no additional directed antibiotics  -vascular follow-up  -local wound care     6  Diabetes mellitus-type 2 with long-term insulin use and hyperglycemia    Discussed the above management plan with the primary service    Antibiotics:  Fluconazole 1  Antifungals 4  Subjective:  Patient has no fever, chills, sweats; no nausea, vomiting, diarrhea; no cough, shortness of breath; no pain  No new symptoms  He is feeling much better overall  He is no longer requiring oxygen support  Objective:  Vitals:  Temp:  [98 4 °F (36 9 °C)-98 5 °F (36 9 °C)] 98 4 °F (36 9 °C)  HR:  [79-96] 96  Resp:  [20] 20  BP: ()/(50-86) 120/59  SpO2:  [92 %-96 %] 96 %  Temp (24hrs), Av 4 °F (36 9 °C), Min:98 4 °F (36 9 °C), Max:98 5 °F (36 9 °C)  Current: Temperature: 98 4 °F (36 9 °C)    Physical Exam:   General Appearance:  Alert, interactive, debilitated, nontoxic, no acute distress  Throat: Oropharynx moist without lesions  Lungs:   Decreased breath sounds left greater than right; no wheezes, rhonchi or rales; respirations unlabored  Left-sided chest tube in place  Heart:  RRR; no murmur, rub or gallop   Abdomen:   Soft, non-tender, non-distended, positive bowel sounds  Extremities: No clubbing, cyanosis  Right lower extremity status post AKA with a non erythematous site   Skin: No new rashes or lesions  No draining wounds noted         Labs, Imaging, & Other studies:   All pertinent labs and imaging studies were personally reviewed  Results from last 7 days   Lab Units 21  0532 02/15/21  2022 02/15/21  1358 02/15/21  0558 02/13/21  2036   WBC Thousand/uL   --   --  9 76 15 91*   HEMOGLOBIN g/dL 7 7* 7 4* 7 2* 8 0* 10 3*   PLATELETS Thousands/uL 261  --   --  287 368     Results from last 7 days   Lab Units 21  0532 02/15/21  0558 02/13/21  2036 SODIUM mmol/L 137 141 139   POTASSIUM mmol/L 3 6 3 4* 3 3*   CHLORIDE mmol/L 101 104 101   CO2 mmol/L 32 36* 37*   BUN mg/dL 16 17 11   CREATININE mg/dL 0 46* 0 49* 0 50*   EGFR ml/min/1 73sq m 120 117 116   CALCIUM mg/dL 7 7* 7 8* 8 9   AST U/L 13  --   --    ALT U/L 10*  --   --    ALK PHOS U/L 80  --   --      Results from last 7 days   Lab Units 02/14/21  0218 02/14/21  0217 02/10/21  1253   BLOOD CULTURE  No Growth at 48 hrs   No Growth at 48 hrs   --    GRAM STAIN RESULT   --   --  1+ Polys  No organisms seen   BODY FLUID CULTURE, STERILE   --   --  Few Colonies of Candida albicans*     Results from last 7 days   Lab Units 02/13/21 2036 02/11/21  0536 02/10/21  0642   PROCALCITONIN ng/ml 0 30* 0 27* 0 31*        chest x-ray-stable left-sided pneumothorax    Images personally reviewed by me in PACS

## 2021-02-16 NOTE — PROGRESS NOTES
Progress Note Gerald Bowens 1960, 56 y o  male MRN: [id number]    Unit/Bed#: [facility] 717-01 Encounter: [id number]    Primary Care Provider: VERONICA Cunningham   Date and time admitted to hospital: 1/13/2021  2:28 PM        Left Hydropneumothorax  Assessment & Plan  Status post chest tube placement  Currently connected to suction  Patient's respiratory status has improved  Oxygen requirements back to 2-3 L  Pulmonology and thoracic surgery following  Chest x-ray shows persistent hydropneumothorax  Plan for  tPA and dornase per thoracic surgery team     Fever  Assessment & Plan  Has been afebrile for approximately 48 hours  Otherwise asymptomatic  No evidence of pneumonia on x-ray  Pleural fluid cultures growing Candida  On fluconazole p o  Per ID, if unable to tolerate p o , switch to IV per them,, appreciate Pulmonary and Infectious Diseases input  Ischemia of right lower extremity with suspected rhabdomyolysis  Assessment & Plan  · Suspected embolic from aortic thrombus  · 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  · Continue therapeutic Lovenox   · Currently stable from a vascular surgery standpoint    Acute encephalopathy  Assessment & Plan  Now resolved  Most likely toxic metabolic due to methadone and gabapentin  Continue to hold gabapentin  Methadone restarted at home dose as patient is on chronic methadone therapy and is requesting for his pain  Avoid any further sedating medications  Right femoral vein DVT (HCC)  Assessment & Plan  Continue Lovenox       Urinary retention  Assessment & Plan  Resolved    Edema of right upper extremity  Assessment & Plan  · +2 pitting edema also with significant weakness/paresis   · S/p Venous duplex on 01/26 negative for acute or chronic DVT   · Resumed back on lasix with some improvement    Oropharyngeal dysphagia  Assessment & Plan  · Extubated 1/25 and required NGT for nutrition   · Speech therapy following   · S/p barium swallow  · Continue modified diet   · Continue robinul  · S/p ENT consult, no vocal cord motion impairment  · Status post video barium swallow on 02/16  Muscle weakness of right upper extremity  Assessment & Plan  · Profound weakness, predominantly in right upper extremity, especially due to critical illness and steroid use  · Overnight stroke alert was called because of concern for worsening right upper extremity weakness  CT head showed questionable hypodensity but no evidence of acute stroke or bleeding  · Patient currently on therapeutic Lovenox  · Strength currently 2 to 3/5 at baseline  · Evaluated by Neurology  No further inpatient workup required  Anemia  Assessment & Plan  · Secondary to critical illness, no sign of acute blood loss  · Neg stool occult  · Continue to monitor and transfuse if < 7    Aortic thrombus (HCC)  Assessment & Plan  · With embolic event that compromised right femoral artery  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  S/p R AKA formalization 1/18   · Continue therapeutic lovenox  · F/u with vascular surgery as outpatient    Acute respiratory failure with hypoxia (HCC)  Assessment & Plan  · Secondary to COVID 19  · Extubated 1/25  · O2 requirements worsen because of hydropneumothorax  Now improved and back to 2-3 L  Type 2 diabetes mellitus, with long-term current use of insulin [facility])  Assessment & Plan  Lab Results   Component Value Date    HGBA1C 11 6 (H) 12/09/2020       Recent Labs     02/15/21  1633 02/15/21  2053 02/16/21  0647 02/16/21  1105   POCGLU 159* 133 149* 106       Blood Sugar Average: Last 72 hrs:  (P) 151 [id number]     · Continue Lantus 10 units with 3 units Humalog t i d  With meals  Continue sliding scale  BMI 28 0-28 9,adult  Assessment & Plan  Patient presenting with malnutrition  S/p nutrition assessment        Methadone maintenance therapy patient [facility])  Assessment & Plan  Chronic methadone use  Methadone was held yesterday due to lethargy yesterday    Restarted methadone today as patient requested due to significant limb pain  And he is on chronic methadone therapy  Will continue to hold gabapentin for now to avoid over sedation and lethargy  Daily EKG for QTC monitoring while on methadone and fluconazole  HTN (hypertension)  Assessment & Plan  · Stable  · Continue lisinopril, lasix    * Pneumonia due to COVID-19 virus  Assessment & Plan  · Tested positive for covid 2020  · S/p completion of covid tx  · S/p completion of IV abx for possible superimposed bacterial pna  · Considered recovered; off isolation        VTE Pharmacologic Prophylaxis:   Pharmacologic: Enoxaparin (Lovenox)  Mechanical VTE Prophylaxis in Place: Yes    Patient Centered Rounds: I have performed bedside rounds with nursing staff today  Discussions with Specialists or Other Care Team Provider:  , Infectious Diseases    Education and Discussions with Family / Patient:  Discussed plan of care with the patient    Time Spent for Care: 30 minutes  More than 50% of total time spent on counseling and coordination of care as described above  Current Length of Stay: 34 day(s)    Current Patient Status: Inpatient   Certification Statement: The patient will continue to require additional inpatient hospital stay due to not medically stable    Discharge Plan: when medically stable    Code Status: Level 1 - Full Code      Subjective:   Patient is awake alert and communicative today  Reports lower extremity pain  Reports that he gets his methadone from methadone clinic and as per his nephew, patient has been on methadone for 30+ years  Patient reports that he also gets drowsy intermittently at home  He is agreeable to holding gabapentin but requests to be restarted on his methadone dose  No other acute pain or discomfort      Objective:     Vitals:   Temp (24hrs), Av 4 °F (36 9 °C), Min:98 4 °F (36 9 °C), Max:98 5 °F (36 9 °C)    Temp:  [98 4 °F (36 9 °C)-98 5 °F (36 9 °C)] 98 4 °F (36 9 °C)  HR:  [79-96] 96  Resp:  [20] 20  BP: ()/(50-86) 120/59  SpO2:  [92 %-96 %] 96 %  Body mass index is 22 67 kg/m²  Input and Output Summary (last 24 hours): Intake/Output Summary (Last 24 hours) at 2/16/2021 1300  Last data filed at 2/16/2021 1200  Gross per 24 hour   Intake 1460 ml   Output 350 ml   Net 1110 ml       Physical Exam:     Physical Exam  Constitutional:       General: He is not in acute distress  Eyes:      Pupils: Pupils are equal, round, and reactive to light  Cardiovascular:      Rate and Rhythm: Normal rate and regular rhythm  Heart sounds: Normal heart sounds  No murmur  Pulmonary:      Effort: No respiratory distress  Breath sounds: No wheezing or rales  Comments: Coarse breath sounds  Chest tube noted  Abdominal:      General: Bowel sounds are normal  There is no distension  Palpations: Abdomen is soft  Tenderness: There is no abdominal tenderness  Musculoskeletal:         General: No swelling  Comments: Right AKA noted   Neurological:      Mental Status: He is alert        Comments: Awake alert and communicative           Labs:    Results from last 7 days   Lab Units 02/16/21  1120 02/16/21  0532   WBC Thousand/uL  --  7 21   HEMOGLOBIN g/dL 8 2* 7 7*   HEMATOCRIT % 27 9* 25 4*   PLATELETS Thousands/uL  --  261   NEUTROS PCT %  --  76*   LYMPHS PCT %  --  11*   MONOS PCT %  --  9   EOS PCT %  --  2     Results from last 7 days   Lab Units 02/16/21  0532   SODIUM mmol/L 137   POTASSIUM mmol/L 3 6   CHLORIDE mmol/L 101   CO2 mmol/L 32   BUN mg/dL 16   CREATININE mg/dL 0 46*   ANION GAP mmol/L 4   CALCIUM mg/dL 7 7*   ALBUMIN g/dL 1 2*   TOTAL BILIRUBIN mg/dL 0 73   ALK PHOS U/L 80   ALT U/L 10*   AST U/L 13   GLUCOSE RANDOM mg/dL 121         Results from last 7 days   Lab Units 02/16/21  1105 02/16/21  0647 02/15/21  2053 02/15/21  1633 02/15/21  1058 02/15/21  0606 02/14/21  2108 02/14/21  1608 02/14/21  1113 02/14/21  0842 02/13/21  2041 02/13/21  1617   POC GLUCOSE mg/dl 106 149* 133 159* 153* 96 190* 228* 205* 191* 132 115         Results from last 7 days   Lab Units 02/13/21 2036 02/11/21  0536 02/10/21  0642   PROCALCITONIN ng/ml 0 30* 0 27* 0 31*           * I Have Reviewed All Lab Data Listed Above  * Additional Pertinent Lab Tests Reviewed: All Labs Within Last 24 Hours Reviewed    Imaging:    [place] barium swallow video w speech   Final Result by ROCHELLE KELLEY (02/16 1154)      XR chest portable   Final Result by Rock Santo MD (02/15 1037)      No change in moderate left pneumothorax  Left pleural effusion on recent CT not visible      Persistent extensive bilateral consolidation and groundglass opacity related to Covid 19 pneumonia/post infectious scar  Workstation performed: [id number]         XR chest portable   Final Result by Emely Mulligan MD (02/15 9565)      Persistent but mildly diminished in size left-sided pneumothorax; no other significant change                  Workstation performed: [id number]         CTA head and neck w wo contrast   Final Result by Harrison Bryant MD (02/13 0860)      No large vessel flow restrictive disease within the head or neck  No evidence of high-grade proximal stenosis of the visualized [place]  No evidence of acute arterial dissection  Portions of the right transverse sinus are not well visualized, however, the left-sided transverse sinus is prominent and appears dominant  If there is clinical concern for acute pathology involving the right transverse sinus, MRV could be performed for    further evaluation, if there are no contraindications  There is subcutaneous emphysema in the left chest wall and tracking into the soft tissues of the posterior left neck    This is new compared with a CT of the chest performed February 13, 2021 at 2:57 AM              I personally discussed this study with PRESLEY CASTRO on 2/13/2021 at 11:25 PM  Workstation performed: [id number]         CT stroke alert brain   Final Result by Malaika Muhammad MD (02/13 3976)      No acute intracranial hemorrhage  Questionable subtle hypodensity superior aspect left cerebral peduncle  MRI with diffusion-weighted imaging is recommended for further evaluation, if there are no contraindications  Findings were directly discussed with Oswaldo CASTRO on 2/13/2021 10:40 PM       Workstation performed: [id number]         XR chest portable   Final Result by Chi Miramontes MD (02/13 2150)      No significant change in moderate left pneumothorax  Left basilar pleural pigtail catheter is present  There is now subcutaneous emphysema in the left lateral chest wall  Clinical correlation and follow-up is recommended  Stable extensive bilateral groundglass opacity  I personally discussed this study with Katie CASTRO on 2/13/2021 at 9:49 PM                             Workstation performed: [id number]         CT chest wo contrast   Final Result by Malaika Muhammad MD (02/13 0802)      There is a moderate-sized left hydropneumothorax which appears at least partially loculated  This appears similar in size to the February 12, 2021 plain film examination  A smallbore pigtail chest tube catheter is in place in the posterolateral lower    left hemithorax pleural space; the metallic marker is located just lateral to the rib margin  Extensive areas of dense consolidation are present bilaterally within the lungs  This appears significantly worse compared with January 12, 2021  The blood in the cardiac chambers appears somewhat hypodense relative to the ventricular walls  This suggests anemia  Correlation with hemoglobin/hematocrit levels is recommended  Cholelithiasis  The images are available for clinical review                 Workstation performed: [id number]         XR chest portable   Final Result by Mili Castaneda MD (02/12 1143)      Slight increase in moderate left pneumothorax  Workstation performed: [id number]         XR chest portable   Final Result by Cade Schultz MD (02/11 1628)      No change in moderate left pneumothorax  Question development of pneumomediastinum  The study was marked in EPIC for significant notification  Workstation performed: [id number]         XR chest portable   Final Result by Cade Schultz MD (02/11 4326)      Left pigtail catheter with minimal decrease in moderate left pneumothorax  Persistent extensive bilateral groundglass opacity, likely post infectious scar from Covid 19 pneumonia  Workstation performed: [id number]         XR chest portable   Final Result by Cade Schultz MD (02/11 1120)      Left pigtail insertion with drainage of the left effusion  Increase in size of left pneumothorax, now moderate  Persistent extensive bilateral groundglass opacity as a result of Covid-19 pneumonia,  Likely post infectious scar  Workstation performed: [id number]         IR chest tube placement   Final Result by Vidhya Caban MD (02/11 1727)   Impression:   1  Successful placement of a therapeutic and [place] all-purpose drainage catheter into the left pleural space under ultrasound guidance  Workstation performed: [id number]         XR chest portable   Final Result by Cade Schultz MD (02/10 0542)      Moderate new left hydropneumothorax  Persistent extensive bilateral groundglass opacity, likely related to Covid 19  I personally discussed this study with Valentino Guerrero on 2/10/2021 at 9:17 AM                            Workstation performed: [id number]         CT head wo contrast   Final Result by Jayant Nelson MD (02/08 1802)      No acute intracranial abnormality  Stable chronic encephalomalacia on the right                    Workstation performed: [id number]         [place] barium swallow video w speech   Final Result by ROCHELLE KELLEY (02/02 1117)      XR chest portable   Final Result by Florian Vines MD (02/01 7336)   1  Persistent bilateral airspace opacities suspicious for multifocal pneumonia  2   Enteric tube is not visualized (in this patient with history of check NG tube placement)  The study was marked in EPIC for significant notification  Workstation performed: [id number]         XR abdomen 1 view kub   Final Result by Florian Vines MD (02/01 2509)   1  Nonobstructive bowel gas pattern  NG tube is not visualized (given history of check NG tube placement)  2   Patchy bilateral opacities are again noted at the bilateral lung bases  Workstation performed: [id number]         XR chest portable ICU   Final Result by Brittanie Stevens MD (01/22 4234)      Right jugular catheter at cavoatrial junction with no pneumothorax      No change in extensive bilateral groundglass opacity due to Covid 19 pneumonia  Workstation performed: [id number]         XR chest portable ICU   Final Result by Brittanie Stevens MD (01/20 7376)      Worsening bilateral groundglass opacity due to Covid 19  Workstation performed: [id number]         VAS upper limb venous duplex scan, complete, bilateral   Final Result by Evangelina Christensen MD (01/17 4541)      XR chest portable ICU   Final Result by Janet Duncan MD (01/15 2304)      1  Stable opacities   2  Stable lines and tubes                  Workstation performed: [id number]         IR lower extremity angiogram   Final Result by Janki Miller (01/22 1437)      XR chest portable   Final Result by Janneth Lennon MD (01/13 3721)         1  Interim slight improvement in bilateral interstitial and alveolar groundglass opacification is noted consistent with Covid 19 pneumonia with possible interim improvement of superimposed edema  2   Lines and tubes as noted  Workstation performed: [id number]         XR chest portable    (Results Pending)       Recent Cultures (last 7 days):     Results from last 7 days   Lab Units 02/14/21  0218 02/14/21  0217 02/10/21  1253   BLOOD CULTURE  No Growth at 48 hrs   No Growth at 48 hrs   --    GRAM STAIN RESULT   --   --  1+ Polys  No organisms seen   BODY FLUID CULTURE, STERILE   --   --  Few Colonies of Candida albicans*       Last 24 Hours Medication List:   Current Facility-Administered Medications   Medication Dose Route Frequency Provider Last Rate    acetaminophen  650 mg Rectal Q6H PRN Vidhya Casanova MD      acetaminophen  650 mg Oral Q6H PRN VERONICA Kennedy      albumin human  25 g Intravenous Once Vidhya Casanova MD Stopped (02/15/21 6434)    albuterol  2 puff Inhalation Q4H PRN Leon Yap,       bisacodyl  10 mg Rectal Daily PRN VERONICA Zimmer      cholecalciferol  2,000 Units Oral Daily VERONICA Kennedy      Diclofenac Sodium  4 g Topical 4x Daily VERONICA Kennedy      enoxaparin  1 mg/kg Subcutaneous Q12H [address] Vidhya Casanova MD      fluconazole  400 mg Oral Q24H Braulio Chacon MD      furosemide  40 mg Oral Daily Leon Yap DO      glycopyrrolate  1 mg Oral TID VERONICA Kennedy      insulin glargine  10 Units Subcutaneous HS Allyson Burdick MD      insulin lispro  1-5 Units Subcutaneous TID AC Allyson Burdick MD      insulin lispro  3 Units Subcutaneous TID With Meals Allyson Burdick MD      Lidocaine Viscous HCl  15 mL Swish & Spit 4x Daily PRN VERONICA Kennedy      lisinopril  10 mg Oral Daily VERONICA Kennedy      menthol-methyl salicylate   Apply externally 4x Daily PRN VERONICA Zimmer      methadone  70 mg Oral Daily MD Jacque Acevedo ANTIFUNGAL  1 application Topical TID Hetul Mccall, DO      multivitamin-minerals  1 tablet Oral Daily VERONICA Kennedy      omeprazole (PRILOSEC) suspension 2 mg/mL 20 mg Oral Early Morning Shannan A STONE HopkinsNP      ondansetron  4 mg Intravenous Q6H PRN Toby Beyer PA-C      phenol  1 spray Mouth/Throat Q2H PRN Shannan A Sandeep, STONENP      polyethylene glycol  17 g Oral Daily Shannan A Rayoora, CRNP      senna  1 tablet Oral BID Shannan A Sandeep, CRNP      sodium chloride  1 spray Each Nare Q1H PRN Lou Ronquillo,       sodium chloride  1,000 mL Intravenous Once Jordana Shirley MD Stopped (02/15/21 3595)        Today, Patient Was Seen By: Jessica Monaco MD    ** Please Note: Dictation voice to text software may have been used in the creation of this document   **

## 2021-02-16 NOTE — ASSESSMENT & PLAN NOTE
Has been afebrile for approximately 48 hours  Otherwise asymptomatic  No evidence of pneumonia on x-ray  Pleural fluid cultures growing Candida  On fluconazole p o  Per ID, if unable to tolerate p o , switch to IV per them,, appreciate Pulmonary and Infectious Diseases input

## 2021-02-16 NOTE — ASSESSMENT & PLAN NOTE
Now resolved  Most likely toxic metabolic due to methadone and gabapentin  Continue to hold gabapentin  Methadone restarted at home dose as patient is on chronic methadone therapy and is requesting for his pain  Avoid any further sedating medications

## 2021-02-16 NOTE — PLAN OF CARE
Problem: Prexisting or High Potential for Compromised Skin Integrity  Goal: Skin integrity is maintained or improved  Description: INTERVENTIONS:  - Identify patients at risk for skin breakdown  - Assess and monitor skin integrity  - Assess and monitor nutrition and hydration status  - Monitor labs   - Assess for incontinence   - Turn and reposition patient  - Assist with mobility/ambulation  - Relieve pressure over bony prominences  - Avoid friction and shearing  - Provide appropriate hygiene as needed including keeping skin clean and dry  - Evaluate need for skin moisturizer/barrier cream  - Collaborate with interdisciplinary team   - Patient/family teaching  - Consider wound care consult   Outcome: Progressing     Problem: Potential for Falls  Goal: Patient will remain free of falls  Description: INTERVENTIONS:  - Assess patient frequently for physical needs  -  Identify cognitive and physical deficits and behaviors that affect risk of falls  -  [place] fall precautions as indicated by assessment   - Educate patient/family on patient safety including physical limitations  - Instruct patient to call for assistance with activity based on assessment  - Modify environment to reduce risk of injury  - Consider OT/PT consult to assist with strengthening/mobility  Outcome: Progressing     Problem: Nutrition/Hydration-ADULT  Goal: Nutrient/Hydration intake appropriate for improving, restoring or maintaining nutritional needs  Description: Monitor and assess patient's nutrition/hydration status for malnutrition  Collaborate with interdisciplinary team and initiate plan and interventions as ordered  Monitor patient's weight and dietary intake as ordered or per policy  Utilize nutrition screening tool and intervene as necessary  Determine patient's food preferences and provide high-protein, high-caloric foods as appropriate       INTERVENTIONS:  - Monitor oral intake, urinary output, labs, and treatment plans  - Assess nutrition and hydration status and recommend course of action  - Evaluate amount of meals eaten  - Assist patient with eating if necessary   - Allow adequate time for meals  - Recommend/ encourage appropriate diets, oral nutritional supplements, and vitamin/mineral supplements  - Order, calculate, and assess calorie counts as needed  - Assess need for intravenous fluids  - Provide specific nutrition/hydration education as appropriate  - Include patient/family/caregiver in decisions related to nutrition  Outcome: Progressing     Problem: PAIN - ADULT  Goal: Verbalizes/displays adequate comfort level or baseline comfort level  Description: Interventions:  - Encourage patient to monitor pain and request assistance  - Assess pain using appropriate pain scale  - Administer analgesics based on type and severity of pain and evaluate response  - Implement non-pharmacological measures as appropriate and evaluate response  - Consider cultural and social influences on pain and pain management  - Notify physician/advanced practitioner if interventions unsuccessful or patient reports new pain  Outcome: Progressing     Problem: INFECTION - ADULT  Goal: Absence or prevention of progression during hospitalization  Description: INTERVENTIONS:  - Assess and monitor for signs and symptoms of infection  - Monitor lab/diagnostic results  - Monitor all insertion sites, i e  indwelling lines, tubes, and drains  - [place] appropriate cooling/warming therapies per order  - Administer medications as ordered  - Instruct and encourage patient and family to use good hand hygiene technique  - Identify and instruct in appropriate isolation precautions for identified infection/condition  Outcome: Progressing  Goal: Absence of fever/infection during neutropenic period  Description: INTERVENTIONS:  - Monitor WBC    Outcome: Progressing     Problem: SAFETY ADULT  Goal: Patient will remain free of falls  Description: INTERVENTIONS:  - Assess patient frequently for physical needs  -  Identify cognitive and physical deficits and behaviors that affect risk of falls    -  [place] fall precautions as indicated by assessment   - Educate patient/family on patient safety including physical limitations  - Instruct patient to call for assistance with activity based on assessment  - Modify environment to reduce risk of injury  - Consider OT/PT consult to assist with strengthening/mobility  Outcome: Progressing  Goal: Maintain or return to baseline ADL function  Description: INTERVENTIONS:  -  Assess patient's ability to carry out ADLs; assess patient's baseline for ADL function and identify physical deficits which impact ability to perform ADLs (bathing, care of mouth/teeth, toileting, grooming, dressing, etc )  - Assess/evaluate cause of self-care deficits   - Assess range of motion  - Assess patient's mobility; develop plan if impaired  - Assess patient's need for assistive devices and provide as appropriate  - Encourage maximum independence but intervene and supervise when necessary  - Involve family in performance of ADLs  - Assess for home care needs following discharge   - Consider OT consult to assist with ADL evaluation and planning for discharge  - Provide patient education as appropriate  Outcome: Progressing  Goal: Maintain or return mobility status to optimal level  Description: INTERVENTIONS:  - Assess patient's baseline mobility status (ambulation, transfers, stairs, etc )    - Identify cognitive and physical deficits and behaviors that affect mobility  - Identify mobility aids required to assist with transfers and/or ambulation (gait belt, sit-to-stand, lift, walker, cane, etc )  - [place] fall precautions as indicated by assessment  - Record patient progress and toleration of activity level on Mobility SBAR; progress patient to next Phase/Stage  - Instruct patient to call for assistance with activity based on assessment  - Consider rehabilitation consult to assist with strengthening/weightbearing, etc   Outcome: Progressing     Problem: DISCHARGE PLANNING  Goal: Discharge to home or other facility with appropriate resources  Description: INTERVENTIONS:  - Identify barriers to discharge w/patient and caregiver  - Arrange for needed discharge resources and transportation as appropriate  - Identify discharge learning needs (meds, wound care, etc )  - Arrange for interpretive services to assist at discharge as needed  - Refer to Case Management Department for coordinating discharge planning if the patient needs post-hospital services based on physician/advanced practitioner order or complex needs related to functional status, cognitive ability, or social support system  Outcome: Progressing     Problem: Knowledge Deficit  Goal: Patient/family/caregiver demonstrates understanding of disease process, treatment plan, medications, and discharge instructions  Description: Complete learning assessment and assess knowledge base    Interventions:  - Provide teaching at level of understanding  - Provide teaching via preferred learning methods  Outcome: Progressing

## 2021-02-16 NOTE — PROGRESS NOTES
Progress Note - Thoracic Surgery   Sara Zamora 56 y o  male MRN: [id number]  Unit/Bed#: [facility] 717-01 Encounter: [id number]    Assessment:  56 y o  M with multiple medical comorbidities including PAD s/p recent AKA, hepatitis C, IVDU on methadone, recent COVID 19 pneumonia complicated by prolonged intubation with L hydropneumothorax ex vacuo despite CT placement     L CT (sxn, -AL) with 150 cc SS drainage over last 24 hrs    Afebrile  VSS on room air  Plan:  No indication for acute surgical intervention  Maintain L chest tube to suction  Monitor air leak and output   Continue Abx/anti-fungals  per ID  Remainder of care per primary team      Subjective/Objective     Subjective: No acute events overnight  Tolerating diet without nausea/vomiting  No fevers, chills  Objective:     Blood pressure 114/63, pulse 85, temperature 98 4 °F (36 9 °C), temperature source Oral, resp  rate 20, height 5' 6" (1 676 m), weight 63 7 kg (140 lb 6 9 oz), SpO2 92 %  ,Body mass index is 22 67 kg/m²        Intake/Output Summary (Last 24 hours) at 2/16/2021 0558  Last data filed at 2/16/2021 0501  Gross per 24 hour   Intake 1343 33 ml   Output 800 ml   Net 543 33 ml       Invasive Devices     Peripheral Intravenous Line            Peripheral IV 02/13/21 Right Wrist 2 days    Peripheral IV 02/14/21 Right Antecubital 2 days          Drain            Chest Tube 1 Left Midaxillary;Pleural 10 Fr  5 days                Physical Exam:   NAD, alert  Normocephalic, atraumatic  MMM, EOMI, PERRLA  Norm resp effort on RA  L CT (sxn, -AL) with SS drainage   RRR  Abd soft, NT/ND  No calf tenderness or peripheral edema  Motor/sensation intact in distal extremities  CN grossly intact  -rash/lesions      Lab, Imaging and other studies:  CBC:   Lab Results   Component Value Date    HGB 7 4 (L) 02/15/2021    HCT 25 4 (L) 02/15/2021   , CMP:   No results found for: SODIUM, K, CL, CO2, ANIONGAP, BUN, CREATININE, GLUCOSE, CALCIUM, AST, ALT, ALKPHOS, PROT, BILITOT, EGFR  VTE Pharmacologic Prophylaxis: Enoxaparin (Lovenox)  VTE Mechanical Prophylaxis: sequential compression device

## 2021-02-17 ENCOUNTER — APPOINTMENT (INPATIENT)
Dept: RADIOLOGY | Facility: HOSPITAL | Age: 61
DRG: 853 | End: 2021-02-17
Attending: INTERNAL MEDICINE
Payer: COMMERCIAL

## 2021-02-17 ENCOUNTER — APPOINTMENT (INPATIENT)
Dept: RADIOLOGY | Facility: HOSPITAL | Age: 61
DRG: 853 | End: 2021-02-17
Payer: COMMERCIAL

## 2021-02-17 PROBLEM — R94.31 PROLONGED Q-T INTERVAL ON ECG: Status: ACTIVE | Noted: 2021-02-17

## 2021-02-17 LAB
ATRIAL RATE: 78 BPM
ATRIAL RATE: 96 BPM
GLUCOSE SERPL-MCNC: 67 MG/DL (ref 65–140)
GLUCOSE SERPL-MCNC: 71 MG/DL (ref 65–140)
GLUCOSE SERPL-MCNC: 77 MG/DL (ref 65–140)
GLUCOSE SERPL-MCNC: 78 MG/DL (ref 65–140)
GLUCOSE SERPL-MCNC: 85 MG/DL (ref 65–140)
GLUCOSE SERPL-MCNC: 91 MG/DL (ref 65–140)
GLUCOSE SERPL-MCNC: 95 MG/DL (ref 65–140)
HCT VFR BLD AUTO: 26.6 % (ref 36.5–49.3)
HCT VFR BLD AUTO: 28.4 % (ref 36.5–49.3)
HGB BLD-MCNC: 8 G/DL (ref 12–17)
HGB BLD-MCNC: 8.7 G/DL (ref 12–17)
P AXIS: 57 DEGREES
P AXIS: 60 DEGREES
PR INTERVAL: 132 MS
PR INTERVAL: 136 MS
QRS AXIS: 39 DEGREES
QRS AXIS: 78 DEGREES
QRSD INTERVAL: 74 MS
QRSD INTERVAL: 78 MS
QT INTERVAL: 378 MS
QT INTERVAL: 428 MS
QTC INTERVAL: 477 MS
QTC INTERVAL: 487 MS
T WAVE AXIS: 42 DEGREES
T WAVE AXIS: 46 DEGREES
VENTRICULAR RATE: 78 BPM
VENTRICULAR RATE: 96 BPM

## 2021-02-17 PROCEDURE — 71045 X-RAY EXAM CHEST 1 VIEW: CPT

## 2021-02-17 PROCEDURE — 99232 SBSQ HOSP IP/OBS MODERATE 35: CPT | Performed by: THORACIC SURGERY (CARDIOTHORACIC VASCULAR SURGERY)

## 2021-02-17 PROCEDURE — 94668 MNPJ CHEST WALL SBSQ: CPT

## 2021-02-17 PROCEDURE — 71046 X-RAY EXAM CHEST 2 VIEWS: CPT

## 2021-02-17 PROCEDURE — 97110 THERAPEUTIC EXERCISES: CPT

## 2021-02-17 PROCEDURE — 0WPBX0Z REMOVAL OF DRAINAGE DEVICE FROM LEFT PLEURAL CAVITY, EXTERNAL APPROACH: ICD-10-PCS | Performed by: THORACIC SURGERY (CARDIOTHORACIC VASCULAR SURGERY)

## 2021-02-17 PROCEDURE — 94760 N-INVAS EAR/PLS OXIMETRY 1: CPT

## 2021-02-17 PROCEDURE — 99232 SBSQ HOSP IP/OBS MODERATE 35: CPT | Performed by: INTERNAL MEDICINE

## 2021-02-17 PROCEDURE — 82948 REAGENT STRIP/BLOOD GLUCOSE: CPT

## 2021-02-17 PROCEDURE — 93005 ELECTROCARDIOGRAM TRACING: CPT

## 2021-02-17 PROCEDURE — 99233 SBSQ HOSP IP/OBS HIGH 50: CPT | Performed by: INTERNAL MEDICINE

## 2021-02-17 PROCEDURE — 99232 SBSQ HOSP IP/OBS MODERATE 35: CPT | Performed by: STUDENT IN AN ORGANIZED HEALTH CARE EDUCATION/TRAINING PROGRAM

## 2021-02-17 PROCEDURE — 93010 ELECTROCARDIOGRAM REPORT: CPT | Performed by: INTERNAL MEDICINE

## 2021-02-17 PROCEDURE — 97530 THERAPEUTIC ACTIVITIES: CPT

## 2021-02-17 PROCEDURE — 85018 HEMOGLOBIN: CPT | Performed by: INTERNAL MEDICINE

## 2021-02-17 PROCEDURE — 85014 HEMATOCRIT: CPT | Performed by: INTERNAL MEDICINE

## 2021-02-17 RX ORDER — INSULIN GLARGINE 100 [IU]/ML
8 INJECTION, SOLUTION SUBCUTANEOUS
Status: DISCONTINUED | OUTPATIENT
Start: 2021-02-17 | End: 2021-02-18

## 2021-02-17 RX ADMIN — GLYCOPYRROLATE 1 MG: 1 TABLET ORAL at 18:07

## 2021-02-17 RX ADMIN — Medication 1 TABLET: at 09:48

## 2021-02-17 RX ADMIN — ENOXAPARIN SODIUM 70 MG: 80 INJECTION SUBCUTANEOUS at 09:50

## 2021-02-17 RX ADMIN — GLYCOPYRROLATE 1 MG: 1 TABLET ORAL at 09:51

## 2021-02-17 RX ADMIN — Medication 2000 UNITS: at 09:48

## 2021-02-17 RX ADMIN — INSULIN GLARGINE 8 UNITS: 100 INJECTION, SOLUTION SUBCUTANEOUS at 21:43

## 2021-02-17 RX ADMIN — MICONAZOLE NITRATE 1 APPLICATION: 20 CREAM TOPICAL at 21:43

## 2021-02-17 RX ADMIN — DICLOFENAC SODIUM 4 G: 10 GEL TOPICAL at 18:12

## 2021-02-17 RX ADMIN — MICAFUNGIN SODIUM 100 MG: 50 INJECTION, POWDER, LYOPHILIZED, FOR SOLUTION INTRAVENOUS at 18:07

## 2021-02-17 RX ADMIN — FUROSEMIDE 40 MG: 40 TABLET ORAL at 09:48

## 2021-02-17 RX ADMIN — MICONAZOLE NITRATE 1 APPLICATION: 20 CREAM TOPICAL at 15:19

## 2021-02-17 RX ADMIN — DICLOFENAC SODIUM 4 G: 10 GEL TOPICAL at 09:46

## 2021-02-17 RX ADMIN — METHADONE HYDROCHLORIDE 70 MG: 10 TABLET ORAL at 09:47

## 2021-02-17 RX ADMIN — FLUCONAZOLE 400 MG: 200 TABLET ORAL at 09:47

## 2021-02-17 RX ADMIN — DICLOFENAC SODIUM 4 G: 10 GEL TOPICAL at 21:43

## 2021-02-17 RX ADMIN — ACETAMINOPHEN 650 MG: 325 TABLET, FILM COATED ORAL at 15:19

## 2021-02-17 RX ADMIN — ENOXAPARIN SODIUM 70 MG: 80 INJECTION SUBCUTANEOUS at 21:42

## 2021-02-17 RX ADMIN — ACETAMINOPHEN 650 MG: 325 TABLET, FILM COATED ORAL at 21:43

## 2021-02-17 RX ADMIN — LISINOPRIL 10 MG: 10 TABLET ORAL at 09:48

## 2021-02-17 RX ADMIN — SENNOSIDES 8.6 MG: 8.6 TABLET, FILM COATED ORAL at 09:48

## 2021-02-17 RX ADMIN — MICONAZOLE NITRATE 1 APPLICATION: 20 CREAM TOPICAL at 09:43

## 2021-02-17 RX ADMIN — GLYCOPYRROLATE 1 MG: 1 TABLET ORAL at 21:42

## 2021-02-17 NOTE — ASSESSMENT & PLAN NOTE
Lab Results   Component Value Date    HGBA1C 11 6 (H) 12/09/2020       Recent Labs     02/17/21  0814 02/17/21  0912 02/17/21  1018 02/17/21  1057   POCGLU 71 95 91 78       Blood Sugar Average: Last 72 hrs:  (P) 129 [id number]     · BBG on the lower side, patient does not eating much due to dysphagia  · Continue Lantus 10 units + SSI  · Discontinue 3 units Humalog t i d  With meals

## 2021-02-17 NOTE — ASSESSMENT & PLAN NOTE
Status post pigtail  On 2/17/21  Currently on 2 L oxygen via NC with SpO2 95% - respiratory status slowly improving  Pulmonology and thoracic surgery following  Follow chest x-ray and lateral status post pigtail removal this morning    Follow-up in the thoracic office 1 month after the discharge from the hospital with chest x-ray PA and lateral results

## 2021-02-17 NOTE — ASSESSMENT & PLAN NOTE
Has been afebrile over the last few days, no clinical evidence of infection  Pleural fluid cultures growing Candida  Patient was on fluconazole 400 mg daily  Today patient has prolonged , fluconazole was switched to micafungin per ID

## 2021-02-17 NOTE — PLAN OF CARE
Problem: PHYSICAL THERAPY ADULT  Goal: Performs mobility at highest level of function for planned discharge setting  See evaluation for individualized goals  Description: Treatment/Interventions: Functional transfer training, LE strengthening/ROM, Therapeutic exercise, Endurance training, Bed mobility          See flowsheet documentation for full assessment, interventions and recommendations  Outcome: Progressing  Note: Prognosis: Fair  Problem List: Decreased strength, Decreased endurance, Impaired balance, Decreased mobility, Decreased skin integrity  Assessment: Pt seen for physical therapy session with a focus on functional mobility, activity tolerance, unsupported siting balance, and there-ex  Continues to make progress in improvements with activity tolerance but does require rest break 2* fatigue  Pt showed progression in ease to complete bed mobility and able to complete supine<> sit with A x 1 today  Continues to require A x 1 to maintain unsupported 2* fatigue  Much improvement noted in RUE weakness compared to last session  Pt completed side-lying there-ex to restore residual ROM  Overall tolerated session well today  Recommend STR upon discharge once medically stable  Barriers to Discharge: Inaccessible home environment, Decreased caregiver support     PT Discharge Recommendation: [address]     PT - OK to Discharge: Yes    See flowsheet documentation for full assessment

## 2021-02-17 NOTE — PROGRESS NOTES
Progress Note - Pulmonary   Radha Mccurdy 56 y o  male MRN: [id number]  Unit/Bed#: [facility] 717-01 Encounter: [id number]      Assessment:     Acute hypoxic respiratory failure- secondary to moderate pneumothorax superimposed on bilateral ground-glass opacification related to prior COVID infection and hydropneumothorax ex vacuo  - s/p left 10F IR chest tube on 2/11 - removed by thoracics on 2/17   - CXR on 2/17 AM showed persistent L PTX slightly increased as per report with persistent left pleural effusion   - cultures grew out few candida albicans - now on fluconazole as per ID   - thoracic surgery following for trapped lung and decortication - no surgical intervention at present likely outpatient   - s/p tpa/dornase x 2 doses over weekend   - off oxygen   - swallow study to check for occult aspiration showed mild pharyngeal dysphagia   - swallow with gastrograffin to check for communication with fistula   - add flutter valve for productive cough     COVID-19 pneumonia  - initially diagnosed on 12/31/2020 and was asymptomatic  Returned on 01/11 with symptoms of SOB requiring intubation on 01/12/2021 and was extubated on 01/25/2021  History of tobacco abuse    Hepatitis-C    Right lower extremity emboli-status post embolectomy and below-the-knee amputation   - on therapeutic Lovenox       Subjective:   Patient seen/examined this AM  He feels much better  Left chest tube removed this morning by thoracics  He is on oxygen but saturating well  Dry cough  Review of Systems   Respiratory: Positive for cough  All other systems reviewed and are negative        Objective:     Vitals:    02/16/21 2011 02/16/21 2123 02/17/21 0305 02/17/21 0718   BP: 120/78 119/78 122/73 134/81   BP Location:    Right arm   Pulse: 97 99 86 80   Resp:  17  18   Temp:  99 3 °F (37 4 °C)  98 4 °F (36 9 °C)   TempSrc:    Oral   SpO2: 96% 95% 95% 95%   Weight:       Height:               Intake/Output Summary (Last 24 hours) at 2/17/2021 [address] filed at 2/16/2021 1405  Gross per 24 hour   Intake 100 ml   Output 600 ml   Net -500 ml         Physical Exam  Vitals signs reviewed  HENT:      Head: Normocephalic  Nose: Nose normal       Mouth/Throat:      Mouth: Mucous membranes are moist    Eyes:      Pupils: Pupils are equal, round, and reactive to light  Cardiovascular:      Rate and Rhythm: Normal rate and regular rhythm  Pulses: Normal pulses  Heart sounds: Normal heart sounds  Pulmonary:      Effort: Pulmonary effort is normal       Breath sounds: Rhonchi present  Comments: Diminished breath sounds b/l  Abdominal:      General: Abdomen is flat  Palpations: Abdomen is soft  Musculoskeletal: Normal range of motion  Comments: RLE AKA    Skin:     General: Skin is warm  Neurological:      General: No focal deficit present  Mental Status: He is alert and oriented to person, place, and time  Labs: I have personally reviewed pertinent lab results  Results from last 7 days   Lab Units 02/16/21 2039 02/16/21  1120 02/16/21  0532  02/15/21  0558 02/13/21 2036   WBC Thousand/uL  --   --  7 21  --  9 76 15 91*   HEMOGLOBIN g/dL 8 1* 8 2* 7 7*   < > 8 0* 10 3*   HEMATOCRIT % 26 8* 27 9* 25 4*   < > 27 0* 34 7*   PLATELETS Thousands/uL  --   --  261  --  287 368   NEUTROS PCT %  --   --  76*  --   --   --    MONOS PCT %  --   --  9  --   --   --     < > = values in this interval not displayed        Results from last 7 days   Lab Units 02/16/21  0532 02/15/21  0558 02/13/21 2036   POTASSIUM mmol/L 3 6 3 4* 3 3*   CHLORIDE mmol/L 101 104 101   CO2 mmol/L 32 36* 37*   BUN mg/dL 16 17 11   CREATININE mg/dL 0 46* 0 49* 0 50*   CALCIUM mg/dL 7 7* 7 8* 8 9   ALK PHOS U/L 80  --   --    ALT U/L 10*  --   --    AST U/L 13  --   --      Results from last 7 days   Lab Units 02/15/21  0558 02/13/21 2036   MAGNESIUM mg/dL 2 0 1 5*                  0   Lab Value Date/Time    TROPONINI <0 02 02/13/2021 2036 Lala Udall <0 03 01/11/2021 1020    TROPONINI <0 03 01/06/2021 8340    TROPONINI <0 03 12/31/2020 1955    TROPONINI <0 02 05/21/2020 1517    TROPONINI <0 02 05/21/2020 1228       Microbiology:  Sputum with mixed maria de jesus   Pleural fluid with candida albicans     Imaging and other studies: I have personally reviewed pertinent reports     and I have personally reviewed pertinent films in PACS        Goldy Virgen MD   Pulmonary & Critical Care Fellow  [facility]

## 2021-02-17 NOTE — QUICK NOTE
The patient was seen and then turned into right lateral position  The right chest pigtail was taken off suction and the dressing over its entry site was removed  The site was prepared with betadiene paint and allowed to dry  The suture at the pigtail site was cut and the pigtail lock suture was released and the pigtail pulled in expiration  The patient tolerated it well  We will check follow up AP and lateral CXR  He should follow up in Thoracic office 1 month after discharge from hospital and should have CXR AP and lateral done prior to his office visit

## 2021-02-17 NOTE — CASE MANAGEMENT
CM noted in [facility] that LTAC denied pt  And [facility] SNF reviewing  CM re send referral to acute rehab- [facility]  Pt is not medically clear for dc  Pt's chest tube was dc today  CM will follow

## 2021-02-17 NOTE — ASSESSMENT & PLAN NOTE
· Extubated 1/25 and required NGT for nutrition   · Speech therapy following   · S/p barium swallow - official report pending  · Continue modified diet  · Continue robinul  · S/p ENT consult, no vocal cord motion impairment  · Status post video barium swallow on 02/16

## 2021-02-17 NOTE — OCCUPATIONAL THERAPY NOTE
OccupationalTherapy Progress Note     Patient Name: Michelle ROSS Date: 2/17/2021  Problem List  Principal Problem:    Pneumonia due to COVID-19 virus  Active Problems:    HTN (hypertension)    Methadone maintenance therapy patient ([facility] [address] )    BMI 28 0-28 9,adult    Type 2 diabetes mellitus, with long-term current use of insulin (HCC)    Acute respiratory failure with hypoxia (HCC)    Sepsis due to COVID-19 [facility])    Aortic thrombus (HCC)    Anemia    Muscle weakness of right upper extremity    Oropharyngeal dysphagia    Edema of right upper extremity    Ischemia of right lower extremity with suspected rhabdomyolysis    Urinary retention    Right femoral vein DVT (HCC)    Fever    Left Hydropneumothorax    RUE weakness    Acute encephalopathy    Prolonged Q-T interval on ECG       02/17/21 1056   OT Last Visit   OT Visit Date 02/17/21   Note Type   Note Type Treatment   Restrictions/Precautions   Weight Bearing Precautions Per Order Yes   RLE Weight Bearing Per Order NWB  (recent AKA)   Other Precautions Cognitive; Chair Alarm; Bed Alarm;WBS;Multiple lines;Telemetry;Aspiration; Fall Risk;Pain;Seizure   Lifestyle   Autonomy I ADLS/IADLS, transfers and functional mobility PTA   Reciprocal Relationships Pt lives w/ his siblings and nephews   Service to Others Pt works full time   Intrinsic Gratification Unable to report @ this time   Pain Assessment   Pain Assessment Tool 0-10   Pain Score 4   Pain Location/Orientation Orientation: Right;Location: Leg   Hospital Pain Intervention(s) Emotional support   ADL   Where Assessed Edge of bed   Grooming Assistance 2  Maximal Assistance   Grooming Comments Pt grasps comb with R UE with [place] assist to maintain effective grasp and assist to lift R UE against gravity to comb R side of head    Pt unable to reach to L side of head with R hand and attempts to complete with L UE, however is too fatigued and requires therapist's assist   overall, walker ~40% of hair   LB Dressing Assistance 1  Total Assistance   LB Dressing Deficit Don/doff L sock   Bed Mobility   Rolling R 4  Minimal assistance   Additional items Assist x 1; Increased time required;Verbal cues;LE management   Supine to Sit 2  Maximal assistance   Additional items Assist x 1; Increased time required;Verbal cues;LE management   Sit to Supine 3  Moderate assistance   Additional items Assist x 1; Increased time required;Verbal cues;LE management   Additional Comments Pt completes longsit in bed 10x with 1 rest break with modA  Pt completes supine to sitting EOB with assist with b/l LE mgmt and trunk mgmt  No lateral lean EOB, but requires Nora for static sitting balance, modA for dynamic sitting balance  Pt left supine in bed with all needs within reach, bed alarm activated   ROM- Right Upper Extremities   RUE ROM Comment b/l UE on 1# therabar to complete AAROM for shoulder and elbow therex  Requires assist for effective grasp on bar with R UE, but able to grasp ulnarly  Cognition   Overall Cognitive Status Impaired   Arousal/Participation Alert; Responsive; Cooperative   Attention Attends with cues to redirect   Orientation Level Oriented X4   Memory Decreased recall of precautions;Decreased short term memory   Following Commands Follows one step commands without difficulty   Comments Pt is very pleasant, cooperative and motivated to be independent  Often states "I can't" when asked to do activities, but willing to attempt  Activity Tolerance   Activity Tolerance Patient limited by fatigue   Medical Staff Made Aware PT, Cm   Assessment   Assessment Pt is seen for OT treatment session 2/17/21 including interventions to: increase activity tolerance, increase endurance, improve dynamic sitting balance, increase functional use R UE, and improve trunk/postural strength  In comparison to previous session, pt with improved bed mobility and improved use of R UE  Pt reports his primary goal is to be able to use his R hand    Pt continues to function below his functional baseline, and is to continue to benefit from skilled occupational therapy services while in the hospital to maximize functioning and independence with daily activities  OT to increase frequency to 3-5x/wk at this time  OT to continue to recommend STR upon d/c    Plan   Treatment Interventions ADL retraining;Functional transfer training;UE strengthening/ROM; Endurance training;Cognitive reorientation;Patient/family training;Equipment evaluation/education; Compensatory technique education;Continued evaluation; Activityengagement   Goal Expiration Date 02/23/21   OT Treatment Day 8   OT Frequency 3-5x/wk   Recommendation   OT Discharge Recommendation Post-Acute Rehabilitation Services   OT - OK to Discharge Yes   AM-PAC Daily Activity Inpatient   Lower Body Dressing 1   Bathing 2   Toileting 1   Upper Body Dressing 2   Grooming 2   Eating 2   Daily Activity Raw Score 10   Turning Head Towards Sound 4   Follow Simple Instructions 3   Low Function Daily Activity Raw Score 17   Low Function Daily Activity Standardized Score 28 95   AM-PAC Applied Cognition Inpatient   Following a Speech/Presentation 3   Understanding Ordinary Conversation 3   Taking Medications 2   Remembering Where Things Are Placed or Put Away 2   Remembering List of 4-5 Errands 2   Taking Care of Complicated Tasks 2   Applied Cognition Raw Score 14   Applied Cognition Standardized Score 32 02     ELDER [place], OTR/L

## 2021-02-17 NOTE — CASE MANAGEMENT
Acute rehab denied pt  CM called and spoke with pt's cousin Aneta Jimenez and informed him that LTAC, IP acute denied pt and that there is no accepting SNF  Aneta Jimenez was agreeable to send referrals to SNF with in 50 mile radius  CM will follow

## 2021-02-17 NOTE — PHYSICAL THERAPY NOTE
Physical Therapy Treatment Note     Patient Name: Rosa JUAREZ'S Date: 2/17/2021     Problem List  Principal Problem:    Pneumonia due to COVID-19 virus  Active Problems:    HTN (hypertension)    Methadone maintenance therapy patient ([facility] )    BMI 28 0-28 9,adult    Type 2 diabetes mellitus, with long-term current use of insulin (HCC)    Acute respiratory failure with hypoxia (HCC)    Sepsis due to COVID-19 [facility])    Aortic thrombus (HCC)    Anemia    Muscle weakness of right upper extremity    Oropharyngeal dysphagia    Edema of right upper extremity    Ischemia of right lower extremity with suspected rhabdomyolysis    Urinary retention    Right femoral vein DVT (HCC)    Fever    Left Hydropneumothorax    RUE weakness    Acute encephalopathy       Past Medical History  Past Medical History:   Diagnosis Date    Diabetes mellitus ([place] [address] )     GERD (gastroesophageal reflux disease)     Hypercholesteremia     Hypertension     Methadone use ([place] [address] )         Past Surgical History  Past Surgical History:   Procedure Laterality Date    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    Procedure: AMPUTATION ABOVE KNEE (AKA);   Surgeon: Laxmi Gardner MD;  [facility];  Service: Vascular    AMPUTATION ABOVE KNEE (AKA) Right 1/18/2021    Procedure: AMPUTATION ABOVE KNEE (AKA) FORMALIZATION,  R AKA wound washout, wound closure;  Surgeon: Laxmi Gardner MD;  [facility];  Service: Vascular    ARTERIOGRAM Right 1/13/2021    Procedure: ARTERIOGRAM;  Surgeon: Molly Barnett DO;  [facility];  Service: Vascular    IR CHEST TUBE PLACEMENT  2/10/2021    IR LOWER EXTREMITY ANGIOGRAM  1/14/2021    THROMBECTOMY W/ EMBOLECTOMY Right 1/13/2021    Procedure: EMBOLECTOMY/THROMBECTOMY LOWER EXTREMITY;  Surgeon: Molly Barnett DO;  [facility];  Service: Vascular         02/17/21 1055   PT Last Visit   PT Visit Date 02/17/21   Note Type Note Type Treatment   Pain Assessment   Pain Assessment Tool 0-10   Pain Score 3   Pain Location/Orientation   (R residual limb)   Restrictions/Precautions   Weight Bearing Precautions Per Order Yes   RLE Weight Bearing Per Order NWB   Other Precautions Chair Alarm;Cognitive; Bed Alarm; Fall Risk;Pain;O2   General   Chart Reviewed Yes   Cognition   Arousal/Participation Alert; Cooperative   Attention Within functional limits   Orientation Level Oriented X4   Memory Within functional limits   Following Commands Follows all commands and directions without difficulty   Comments Patient is very pleasant and motiviated to participate in PT session   Subjective   Subjective "I always have a burst of energy in the AM"   Bed Mobility   Rolling R 4  Minimal assistance  (able to reach with RUE to use bedrail)   Additional items Assist x 1; Increased time required;Verbal cues;LE management   Supine to Sit 2  Maximal assistance   Additional items Assist x 1; Increased time required;Verbal cues   Sit to Supine 3  Moderate assistance   Additional items Assist x 1; Increased time required;Verbal cues;LE management   Additional Comments Able to complete bed mobility with A x 1 today, able to move LLE off the EOB and weight shift hips toward EOB  Required min-mod A x 1 for unsupported sitting balance     Transfers   Sit to Stand Unable to assess   Additional Comments Not appropriate 2* fatigue after ther-ex and sitting tolerance   Balance   Static Sitting Poor +   Dynamic Sitting Poor   Endurance Deficit   Endurance Deficit Yes   Endurance Deficit Description activity tolerance and endurance   Activity Tolerance   Activity Tolerance Patient limited by fatigue;Treatment limited secondary to medical complications (Comment)   Medical Staff Made Aware DARRYN Williamson   Nurse Made Aware Yes   Exercises   Hip Abduction 15 reps;Right;AROM  (side-lying)   Hip Extension Right;AROM;15 reps  (Side-lying)   Balance training  Forward trunk weight shifting/partial sit up, with a bedsheet wrapped around patient's upper trunk 5 x 2 - mod A x 1 to complete  Unsupported static sitting balance for approx ~12 minutes with min-mod A x 1    Assessment   Prognosis Fair   Problem List Decreased strength;Decreased endurance; Impaired balance;Decreased mobility; Decreased skin integrity   Assessment Pt seen for physical therapy session with a focus on functional mobility, activity tolerance, unsupported siting balance, and there-ex  Continues to make progress in improvements with activity tolerance but does require rest break 2* fatigue  Pt showed progression in ease to complete bed mobility and able to complete supine<> sit with A x 1 today  Continues to require A x 1 to maintain unsupported 2* fatigue  Much improvement noted in RUE weakness compared to last session  Pt completed side-lying there-ex to restore residual ROM  Overall tolerated session well today  Recommend STR upon discharge once medically stable  Goals   Patient Goals To get stronger   STG Expiration Date 02/26/21   PT Treatment Day 6   Plan   Treatment/Interventions Functional transfer training;LE strengthening/ROM;ADL retraining; Therapeutic exercise; Endurance training;Cognitive reorientation;Patient/family training;Bed mobility;Gait training   Progress Progressing toward goals   PT Frequency   (3-5x/wk)   Recommendation   PT Discharge Recommendation Post-Acute Rehabilitation Services   Equipment Recommended Wheelchair   PT - OK to Discharge Yes   Additional Comments to STR once medically stable   AM-PAC Basic Mobility Inpatient   Turning in Bed Without Bedrails 3   Lying on Back to Sitting on Edge of Flat Bed 2   Moving Bed to Chair 1   Standing Up From Chair 1   Walk in Room 1   Climb 3-5 Stairs 1   Basic Mobility Inpatient Raw Score 9   Turning Head Towards Sound 4   Follow Simple Instructions 4   Low Function Basic Mobility Raw Score 17   Low Function Basic Mobility Standardized Score 27 46 Jamal Rodas PT, DPT

## 2021-02-17 NOTE — ASSESSMENT & PLAN NOTE
· Suspected embolic from aortic thrombus  · 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  · Continue therapeutic Lovenox , pain management  · Currently stable from a vascular surgery standpoint

## 2021-02-17 NOTE — ASSESSMENT & PLAN NOTE
-the patient has prolonged QTC of 487, patient is on methadone and fluconazole  -Patient is on methadone for years  -Patient fluconazole was switched to micafungin by ID

## 2021-02-17 NOTE — ASSESSMENT & PLAN NOTE
· Profound weakness, predominantly in right upper extremity, especially due to critical illness and steroid use  · During hospitalization, stroke alert was called because of concern for worsening right upper extremity weakness  CT head showed questionable hypodensity but no evidence of acute stroke or bleeding  · Patient currently on therapeutic Lovenox  · Strength currently 2 to 3/5 at baseline  · Evaluated by Neurology  No further inpatient workup required

## 2021-02-17 NOTE — ASSESSMENT & PLAN NOTE
· Secondary to critical illness, no sign of acute blood loss  · Neg stool occult  · Hemoglobin stable around his baseline  · Continue to monitor and transfuse if < 7

## 2021-02-17 NOTE — PROGRESS NOTES
PHYSICAL MEDICINE AND REHABILITATION PROGRESS NOTE  Barry Farley 56 y o  male MRN: [id number]  [facility] Encounter: [id number]    Requested by (Physician/Service): Doris Ward MD  Reason for Consultation:  Assessment of rehabilitation needs    Assessment:  Rehabilitation Diagnosis:   · Critical illness myopathy   · Right transfemoral amputation   · Pneumonia due to COVID 19  · Left foot drop  · Impaired mobility and self care    Recommendations:  Rehabilitation Plan:  · Continue PT/OT (SLP) while on acute care  · Would still ideally benefit from [facility] until sacral wounds heal  He no longer has the pleural catheter and the chance for him to qualify has lessened  If this is the case, a subacute rehab would be his next best option until his sacral wound heals  At this stage he is unable to work on wheelchair level activity and would be restricted to bed only activities  He would not be a candidate for acute inpatient rehab at this time  · Will need continued education for contracture prevention and residual limb care  · Will continue to follow his medical/functional progress   Covid-19 Testing: [facility] rehabilitation units require testing within 48 hours of potential admission for all general admissions  Please re-test if needed  *Re-testing is NOT required for patients recovering from COVID-19 infection if isolation has been discontinued per CDC criteria  Medical Co-morbidities Plan:  · Right Transfemoral amputation on 1/14 due to ischemic limb  · Pneumonia due to COVID-19 virus   Now off isolation as per CDC guidelines  · Right sided weakness  · Fevers  · Type 2 Diabetes  · R femoral vein DVT on lovenox  · Metabolic Acidosis  · Aortic thrombus leading to critical limb ischemia  · Hematuria post cystoscopy  · Acute respiratory failure requiring intubation, but successfully extubated  · Hydropneumothorax s/p pleuracath, discontinued morning on 2/17  · Left candidal empyema  · Anemia  · Unstageable sacral wound  · Right 2nd finger DTI  Bladder plan: external catheter  DVT ppx: scd and lovenox      Do not hesitate to contact service with further questions  Ramirez Jarquin DO  PM&R    Interval History:  2/10 Diagnosed with large sacral unstageable (evolved DTI)  2/12 Repeat CXR reviewed with moderate PTX still present  Thoracic sx consulted, who recommended repeat CT Chest and starting an antifungal    2/13 Repeat chest imaging showed that L pleural space issues will likely not resolve without operative intervention (moderate L hydroPTX partially loculated), however patient is a poor surgical candidate for lung surgery given COVID PNA  They gave 1 dose of tPA to help drain the L pleural space  Per pulm will need likely 2 weeks of treatment with antifungal due to Candida growth in pleural fluid  2/14 With L sided chest pain  Troponin WNL, EKG no ischemic changes  RUE flaccid (stroke alert called) started on pathway  CTA H/N - no hemorrhage, subtle hypodensity in L cerebral peduncle, and no LVO  MRI deferred by Neuro - recommended d/c ASA 81mg  Per neuro, R sided symptoms were previously present (no concern for acute process)  Got 2nd dose of tPA to chest tube  2/17: Chest pigtail catheter removed, sating 95% on 2L O2     Has been having fevers  Feels very weak, tired  Denies any CP, SOB, fevers, chills, N/V  Last BM 2/17  Review of Systems: 10 point ROS negative except for what is noted in HPI    Function:  Prior level of function and living situation:  Lives with his nephew, brothers, and sisters  All family lives together pretty much  3-4 FRANDY with railing and threshold to enter front door  Only bathroom is on the second floor next to his bedroom  Family is willing to help out and set him up on the first floor      Current level of function:  Physical Therapy: Bed mobility Mod A, Transfer Max A x2 but unable to stand with right knee block and assistx2  Occupational Therapy: Grooming Min A, UB bathing Mod A, UB dressing/LB ADLs Max A      Physical Exam:  /73   Pulse 82   Temp 98 6 °F (37 °C)   Resp 18   Ht 5' 6" (1 676 m)   Wt 63 7 kg (140 lb 6 9 oz)   SpO2 92%   BMI 22 67 kg/m²      No intake or output data in the 24 hours ending 02/17/21 1546    Body mass index is 22 67 kg/m²  Gen: No acute distress, ill and frail appearing  HEENT: Moist mucus membranes, Normocephalic/Atraumatic  Cardiovascular: Regular  Heme/Extr: No edema  Pulmonary: Non-labored breathing  Lungs CTAB  : external rogers catheter  GI: Soft, non-tender, non-distended  BS+  Integumentary: Skin is warm, dry  Sacral wound observed today with nursing  See wound care pictures dated from 2/17 for details  Neuro: AAOx3  Able to range to almost neutral, but very tight  MMT:   Strength:   Right  Left  Site  Right  Left  Site    1 3  S Ab: Shoulder Abductors  4  1  HF: Hip Flexors    3 4  EF: Elbow Flexors  -  3 KF: Knee Flexors    2 4-  EE: Elbow Extensors  -  3  KE: Knee Extensors    2 4  WE: Wrist Extensors  -  1  DR: Dorsi Flexors    3  3  FF: Finger Flexors  -  *  PF: Plantar Flexors    1  3  HI: Hand Intrinsics  -  *  EHL: Extensor Hallucis Longus   Psych: Normal mood and affect                Social History:    Social History     Socioeconomic History    Marital status:      Spouse name: Not on file    Number of children: Not on file    Years of education: Not on file    Highest education level: Not on file   Occupational History    Not on file   Social Needs    Financial resource strain: Not on file    Food insecurity     Worry: Not on file     Inability: Not on file   [employer] needs     Medical: Not on file     Non-medical: Not on file   Tobacco Use    Smoking status: Former Smoker    Smokeless tobacco: Never Used   Substance and Sexual Activity    Alcohol use: No    Drug use: No     Comment: methadone    Sexual activity: Not on file   Lifestyle    Physical activity     Days per week: Not on file     Minutes per session: Not on file    Stress: Not on file   Relationships    Social connections     Talks on phone: Not on file     Gets together: Not on file     Attends Nondenominational service: Not on file     Active member of club or organization: Not on file     Attends meetings of clubs or organizations: Not on file     Relationship status: Not on file    Intimate partner violence     Fear of current or ex partner: Not on file     Emotionally abused: Not on file     Physically abused: Not on file     Forced sexual activity: Not on file   Other Topics Concern    Not on file   Social History Narrative    Not on file        Family History:    Family History   Problem Relation Age of Onset    Diabetes Mother     Hypertension Father     Leukemia Father     Acute lymphoblastic leukemia Father     Cancer Sister          Medications:     Current Facility-Administered Medications:     acetaminophen (TYLENOL) rectal suppository 650 mg, 650 mg, Rectal, Q6H PRN, Yosef Tafoya MD    acetaminophen (TYLENOL) tablet 650 mg, 650 mg, Oral, Q6H PRN, Shannan Cadeora, CRNP, 650 mg at 02/17/21 1519    albumin human (FLEXBUMIN) 25 % injection 25 g, 25 g, Intravenous, Once, Yosef Tafoya MD, Stopped at 02/15/21 1715    albuterol (PROVENTIL HFA,VENTOLIN HFA) inhaler 2 puff, 2 puff, Inhalation, Q4H PRN, Handy Joiner DO    bisacodyl (DULCOLAX) rectal suppository 10 mg, 10 mg, Rectal, Daily PRN, Shannan Cadeora, CRNP, 10 mg at 01/21/21 2345    cholecalciferol (VITAMIN D3) tablet 2,000 Units, 2,000 Units, Oral, Daily, Shannan Hopkins CRNP, 2,000 Units at 02/17/21 0948    Diclofenac Sodium (VOLTAREN) 1 % topical gel 4 g, 4 g, Topical, 4x Daily, Shannan Hopkins, CRNP, 4 g at 02/17/21 0946    enoxaparin (LOVENOX) subcutaneous injection 70 mg, 1 mg/kg, Subcutaneous, Q12H [address], Yosef Tafoya MD, 70 mg at 02/17/21 0950    fluconazole (DIFLUCAN) tablet 400 mg, 400 mg, Oral, Q24H, Minda Galindo MD, 400 mg at 02/17/21 0947    furosemide (LASIX) tablet 40 mg, 40 mg, Oral, Daily, Padmini Bermeo DO, 40 mg at 02/17/21 0948    glycopyrrolate (ROBINUL) tablet 1 mg, 1 mg, Oral, TID, Shannan Hopkins, CRNP, 1 mg at 02/17/21 0951    insulin glargine (LANTUS) subcutaneous injection 8 Units 0 08 mL, 8 Units, Subcutaneous, HS, Tashi Alan MD    insulin lispro (HumaLOG) 100 units/mL subcutaneous injection 1-5 Units, 1-5 Units, Subcutaneous, TID AC, 1 Units at 02/15/21 1641 **AND** Fingerstick Glucose (POCT), , , TID AC, Almaz Foster MD    Lidocaine Viscous HCl (XYLOCAINE) 2 % mucosal solution 15 mL, 15 mL, Swish & Spit, 4x Daily PRN, Shannan Hopkins CRNP, 15 mL at 01/27/21 0800    lisinopril (ZESTRIL) tablet 10 mg, 10 mg, Oral, Daily, Shannan Hopkins, CRNP, 10 mg at 02/17/21 0948    menthol-methyl salicylate (BENGAY) 43-59 % cream, , Apply externally, 4x Daily PRN, Shannan Hopkins CRNP, Given at 02/14/21 2129    methadone (DOLOPHINE) tablet 70 mg, 70 mg, Oral, Daily, Mone Groves MD, 70 mg at 02/17/21 0947    moisture barrier miconazole 2% cream (aka SAMREEN MOISTURE BARRIER ANTIFUNGAL CREAM), 1 application, Topical, TID, Sonia Mccall DO, 1 application at 32/34/58 1519    [COMPLETED] zinc sulfate (ZINCATE) capsule 220 mg, 220 mg, Oral, Daily, 220 mg at 01/18/21 0800 **FOLLOWED BY** multivitamin-minerals (CENTRUM ADULTS) tablet 1 tablet, 1 tablet, Oral, Daily, Shannan Hopkins, CRNP, 1 tablet at 02/17/21 0948    omeprazole (PRILOSEC) suspension 2 mg/mL, 20 mg, Oral, Early Morning, VERONICA Kennedy, 20 mg at 02/16/21 0532    ondansetron (ZOFRAN) injection 4 mg, 4 mg, Intravenous, Q6H PRN, Toby Beyer PA-C, 4 mg at 02/14/21 1635    phenol (CHLORASEPTIC) 1 4 % mucosal liquid 1 spray, 1 spray, Mouth/Throat, Q2H PRN, VERONICA Kennedy, 1 spray at 02/15/21 7278    polyethylene glycol (MIRALAX) packet 17 g, 17 g, Oral, Daily, Shannan Hopkins, VERONICA, Stopped at 02/17/21 3195    senna (SENOKOT) tablet 8 6 mg, 1 tablet, Oral, BID, Shannan Hopkins, VERONICA, 8 6 mg at 02/17/21 0948    sodium chloride (OCEAN) 0 65 % nasal spray 1 spray, 1 spray, Each Nare, Q1H PRN, Tammy Singh DO    sodium chloride 0 9 % bolus 1,000 mL, 1,000 mL, Intravenous, Once, Jina Duran MD, Stopped at 02/15/21 1715    Past Medical History:     Past Medical History:   Diagnosis Date    Diabetes mellitus ([facility] [address]     GERD (gastroesophageal reflux disease)     Hypercholesteremia     Hypertension     [facility]         Past Surgical History:     Past Surgical History:   Procedure Laterality Date    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    Procedure: AMPUTATION ABOVE KNEE (AKA); Surgeon: Jacqueline Huerta MD;  [facility];  Service: Vascular    AMPUTATION ABOVE KNEE (AKA) Right 1/18/2021    Procedure: AMPUTATION ABOVE KNEE (AKA) FORMALIZATION,  R AKA wound washout, wound closure;  Surgeon: Jacqueline Huerta MD;  [facility];  Service: Vascular    ARTERIOGRAM Right 1/13/2021    Procedure: ARTERIOGRAM;  Surgeon: David Davis DO;  [facility];  Service: Vascular    IR CHEST TUBE PLACEMENT  2/10/2021    IR LOWER EXTREMITY ANGIOGRAM  1/14/2021    THROMBECTOMY W/ EMBOLECTOMY Right 1/13/2021    Procedure: EMBOLECTOMY/THROMBECTOMY LOWER EXTREMITY;  Surgeon: David Davis DO;  [facility];  Service: Vascular         Allergies:      Allergies   Allergen Reactions    Varenicline            LABORATORY RESULTS:      Lab Results   Component Value Date    HGB 8 7 (L) 02/17/2021    HCT 28 4 (L) 02/17/2021    WBC 7 21 02/16/2021     Lab Results   Component Value Date    BUN 16 02/16/2021    K 3 6 02/16/2021     02/16/2021    GLUCOSE 179 (H) 01/13/2021    CREATININE 0 46 (L) 02/16/2021     Lab Results   Component Value Date    PROTIME 17 5 (H) 01/14/2021    INR 1 44 (H) 01/14/2021        DIAGNOSTIC STUDIES: Reviewed  Xr Chest Portable    Result Date: 1/13/2021  Impression: 1  Interim slight improvement in bilateral interstitial and alveolar groundglass opacification is noted consistent with Covid 19 pneumonia with possible interim improvement of superimposed edema  2   Lines and tubes as noted  Workstation performed: [id number]     Xr Chest Portable Icu    Result Date: 1/15/2021  Impression: 1  Stable opacities 2    Stable lines and tubes Workstation performed: [id number]     Rigoberto Moore DO  Physical Medicine and Randi

## 2021-02-17 NOTE — PLAN OF CARE
Problem: Prexisting or High Potential for Compromised Skin Integrity  Goal: Skin integrity is maintained or improved  Description: INTERVENTIONS:  - Identify patients at risk for skin breakdown  - Assess and monitor skin integrity  - Assess and monitor nutrition and hydration status  - Monitor labs   - Assess for incontinence   - Turn and reposition patient  - Assist with mobility/ambulation  - Relieve pressure over bony prominences  - Avoid friction and shearing  - Provide appropriate hygiene as needed including keeping skin clean and dry  - Evaluate need for skin moisturizer/barrier cream  - Collaborate with interdisciplinary team   - Patient/family teaching  - Consider wound care consult   Outcome: Progressing     Problem: Potential for Falls  Goal: Patient will remain free of falls  Description: INTERVENTIONS:  - Assess patient frequently for physical needs  -  Identify cognitive and physical deficits and behaviors that affect risk of falls  -  [place] fall precautions as indicated by assessment   - Educate patient/family on patient safety including physical limitations  - Instruct patient to call for assistance with activity based on assessment  - Modify environment to reduce risk of injury  - Consider OT/PT consult to assist with strengthening/mobility  Outcome: Progressing     Problem: Nutrition/Hydration-ADULT  Goal: Nutrient/Hydration intake appropriate for improving, restoring or maintaining nutritional needs  Description: Monitor and assess patient's nutrition/hydration status for malnutrition  Collaborate with interdisciplinary team and initiate plan and interventions as ordered  Monitor patient's weight and dietary intake as ordered or per policy  Utilize nutrition screening tool and intervene as necessary  Determine patient's food preferences and provide high-protein, high-caloric foods as appropriate       INTERVENTIONS:  - Monitor oral intake, urinary output, labs, and treatment plans  - Assess nutrition and hydration status and recommend course of action  - Evaluate amount of meals eaten  - Assist patient with eating if necessary   - Allow adequate time for meals  - Recommend/ encourage appropriate diets, oral nutritional supplements, and vitamin/mineral supplements  - Order, calculate, and assess calorie counts as needed  - Assess need for intravenous fluids  - Provide specific nutrition/hydration education as appropriate  - Include patient/family/caregiver in decisions related to nutrition  Outcome: Progressing     Problem: PAIN - ADULT  Goal: Verbalizes/displays adequate comfort level or baseline comfort level  Description: Interventions:  - Encourage patient to monitor pain and request assistance  - Assess pain using appropriate pain scale  - Administer analgesics based on type and severity of pain and evaluate response  - Implement non-pharmacological measures as appropriate and evaluate response  - Consider cultural and social influences on pain and pain management  - Notify physician/advanced practitioner if interventions unsuccessful or patient reports new pain  Outcome: Progressing     Problem: INFECTION - ADULT  Goal: Absence or prevention of progression during hospitalization  Description: INTERVENTIONS:  - Assess and monitor for signs and symptoms of infection  - Monitor lab/diagnostic results  - Monitor all insertion sites, i e  indwelling lines, tubes, and drains  - [place] appropriate cooling/warming therapies per order  - Administer medications as ordered  - Instruct and encourage patient and family to use good hand hygiene technique  - Identify and instruct in appropriate isolation precautions for identified infection/condition  Outcome: Progressing     Problem: SAFETY ADULT  Goal: Patient will remain free of falls  Description: INTERVENTIONS:  - Assess patient frequently for physical needs  -  Identify cognitive and physical deficits and behaviors that affect risk of falls    - [place] fall precautions as indicated by assessment   - Educate patient/family on patient safety including physical limitations  - Instruct patient to call for assistance with activity based on assessment  - Modify environment to reduce risk of injury  - Consider OT/PT consult to assist with strengthening/mobility  Outcome: Progressing  Goal: Maintain or return to baseline ADL function  Description: INTERVENTIONS:  -  Assess patient's ability to carry out ADLs; assess patient's baseline for ADL function and identify physical deficits which impact ability to perform ADLs (bathing, care of mouth/teeth, toileting, grooming, dressing, etc )  - Assess/evaluate cause of self-care deficits   - Assess range of motion  - Assess patient's mobility; develop plan if impaired  - Assess patient's need for assistive devices and provide as appropriate  - Encourage maximum independence but intervene and supervise when necessary  - Involve family in performance of ADLs  - Assess for home care needs following discharge   - Consider OT consult to assist with ADL evaluation and planning for discharge  - Provide patient education as appropriate  Outcome: Progressing  Goal: Maintain or return mobility status to optimal level  Description: INTERVENTIONS:  - Assess patient's baseline mobility status (ambulation, transfers, stairs, etc )    - Identify cognitive and physical deficits and behaviors that affect mobility  - Identify mobility aids required to assist with transfers and/or ambulation (gait belt, sit-to-stand, lift, walker, cane, etc )  - [place] fall precautions as indicated by assessment  - Record patient progress and toleration of activity level on Mobility SBAR; progress patient to next Phase/Stage  - Instruct patient to call for assistance with activity based on assessment  - Consider rehabilitation consult to assist with strengthening/weightbearing, etc   Outcome: Progressing     Problem: DISCHARGE PLANNING  Goal: Discharge to home or other facility with appropriate resources  Description: INTERVENTIONS:  - Identify barriers to discharge w/patient and caregiver  - Arrange for needed discharge resources and transportation as appropriate  - Identify discharge learning needs (meds, wound care, etc )  - Arrange for interpretive services to assist at discharge as needed  - Refer to Case Management Department for coordinating discharge planning if the patient needs post-hospital services based on physician/advanced practitioner order or complex needs related to functional status, cognitive ability, or social support system  Outcome: Progressing     Problem: Knowledge Deficit  Goal: Patient/family/caregiver demonstrates understanding of disease process, treatment plan, medications, and discharge instructions  Description: Complete learning assessment and assess knowledge base    Interventions:  - Provide teaching at level of understanding  - Provide teaching via preferred learning methods  Outcome: Progressing

## 2021-02-17 NOTE — UTILIZATION REVIEW
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  Intubated  1-12 to 1-25  All covid  Treatment completed  Complicated by L hydropneumothorax  Assessment/Plan:   Pt continues on oxygen at 2L NC  Respiratory status slowly improving  CXR still showing L pneumothorax mildly increased in size  Continues with L side pigtail cath in place to suction with 0 output  This catheter was removed  Good tight glucose control  D/c humalog and decreasing Lantus to 8 units q hs  Pt c/o phantom limb pain and some nausea  Pertinent Labs/Diagnostic Results:     2/17 CXR - Persistent left pneumothorax, mildly increased in size  Left pleural fluid, moderately increased in amount       2/16 Video Swallow study - no aspiration noted  Can have level 2 diet, nectar thick liquids in upright position,       Results from last 7 days   Lab Units 02/17/21  0935 02/16/21 2039 02/16/21  1120 02/16/21  0532 02/15/21  2022  02/15/21  0558 02/13/21 2036   WBC Thousand/uL  --   --   --  7 21  --   --  9 76 15 91*   HEMOGLOBIN g/dL 8 7* 8 1* 8 2* 7 7* 7 4*   < > 8 0* 10 3*   HEMATOCRIT % 28 4* 26 8* 27 9* 25 4* 25 4*   < > 27 0* 34 7*   PLATELETS Thousands/uL  --   --   --  261  --   --  287 368   NEUTROS ABS Thousands/µL  --   --   --  5 58  --   --   --   --     < > = values in this interval not displayed           Results from last 7 days   Lab Units 02/16/21  0532 02/15/21  0558 02/13/21 2036 02/11/21  0949   SODIUM mmol/L 137 141 139 138   POTASSIUM mmol/L 3 6 3 4* 3 3* 3 7   CHLORIDE mmol/L 101 104 101 100   CO2 mmol/L 32 36* 37* 35*   ANION GAP mmol/L 4 1* 1* 3*   BUN mg/dL 16 17 11 13   CREATININE mg/dL 0 46* 0 49* 0 50* 0 40*   EGFR ml/min/1 73sq m 120 117 116 127   CALCIUM mg/dL 7 7* 7 8* 8 9 8 0*   MAGNESIUM mg/dL  --  2 0 1 5*  --      Results from last 7 days   Lab Units 02/16/21  0532   AST U/L 13   ALT U/L 10*   ALK PHOS U/L 80   TOTAL PROTEIN g/dL 6 5   ALBUMIN g/dL 1 2*   TOTAL BILIRUBIN mg/dL 0 73     Results from last 7 days   Lab Units 02/17/21  1629 02/17/21  1057 02/17/21  1018 02/17/21  0912 02/17/21  0814 02/17/21  0733 02/16/21  2121 02/16/21  1611 02/16/21  1105 02/16/21  0647 02/15/21  2053 02/15/21  1633   POC GLUCOSE mg/dl 85 78 91 95 71 67 93 92 106 149* 133 159*     Results from last 7 days   Lab Units 02/16/21  0532 02/15/21  0558 02/13/21 2036 02/11/21  0949   GLUCOSE RANDOM mg/dL 121 81 116 96             BETA-HYDROXYBUTYRATE   Date Value Ref Range Status   01/11/2021 6 2 (H) <0 6 mmol/L Final      Results from last 7 days   Lab Units 02/15/21  1348   PH ART  7 458*   PCO2 ART mm Hg 48 7*   PO2 ART mm Hg 55 7*   HCO3 ART mmol/L 33 7*   BASE EXC ART mmol/L 8 9   O2 CONTENT ART mL/dL 10 0*   O2 HGB, ARTERIAL % 87 5*   ABG SOURCE  Radial, Left     Results from last 7 days   Lab Units 02/13/21 2036   TROPONIN I ng/mL <0 02     Results from last 7 days   Lab Units 02/13/21 2036 02/11/21  0536   PROCALCITONIN ng/ml 0 30* 0 27*     Results from last 7 days   Lab Units 02/13/21 2036   NT-PRO BNP pg/mL 740*     Results from last 7 days   Lab Units 02/14/21  0218 02/14/21  0217   BLOOD CULTURE  No Growth at 72 hrs  No Growth at 72 hrs       Vital Signs:   02/17/21 1642  --  --  --  --  --  --  --  28  2 L/min  --  --   02/17/21 15:20:43  98 6 °F (37 °C)  82  --  142/73  96  92 %  --  --  --  --  --   02/17/21 10:58:48  97 6 °F (36 4 °C)  87  18  136/87  103  97 %  --  --  --  --  Lying   02/17/21 10:19:18  98 °F (36 7 °C)  92  --  135/82  100  94 %  --  --  --  --  --   02/17/21 09:49:52  --  80  --  136/80  99  98 %  --  --  --  --  --   02/17/21 0943  --  --  --  136/80  --  --  --  --  --  --  --   02/17/21 0800  --  --  --  --  --  --  --  28  2 L/min  Nasal cannula  --   02/17/21 0718  98 4 °F (36 9 °C)  80  18 134/81  99  95 %  --  --  --  --  Lying   02/17/21 03:05:20  --  86  --  122/73  89  95 %  --  --  --  --  --   02/16/21 21:23:21  99 3 °F (37 4 °C)  99  17  119/78  92  95 %  --  --  --  --  --   02/16/21 20:11:48  --  97  --  120/78  92  96 %  --  --  --  --  --   02/16/21 2000  --  --  --  --  --  --  --  28  2 L/min  Nasal cannula  --   02/16/21 19:27:28  --  --  --  110/86  94  --  --  --  --  --  --   02/16/21 1500  99 °F (37 2 °C)  109Abnormal   18  156/78  --  100 %  --  --  --  --  Lying   02/16/21 1441  --  --  --  --  --  93 %  --  --  --  None (Room air)  --   02/16/21 0845  98 4 °F (36 9 °C)  96  20  120/59  83  96 %  --  --  --  --  Lying   02/16/21 0800  --  --  --  --  --  --  --  28  2 L/min  Nasal cannula  --   02/15/21 21:44:46  98 4 °F (36 9 °C)  85  20  114/63  83  --  --  --  --  --  Lying   02/15/21 21:27:03  --  --  --  101/86  91  --  --  --  --  --  --   02/15/21 2000  --  --  --  --  --  --  75  24  1 L/min  Nasal cannula  --   02/15/21 1708  --  90  --  107/59  75  92 %  --  24  1 L/min  Nasal cannula  Lying   02/15/21 1500  98 5 °F (36 9 °C)  88  20  89/50Abnormal   64  95 %  --  --  --  Nasal cannula  Lying   02/15/21 1332  --  79  20  86/52Abnormal   --  96 %  --  24  1 L/min  Nasal cannula  Lying   02/15/21 1032  --  89  --  93/51  --  97 %  --  28  2 L/min  Nasal cannula  Lying   02/15/21 0800  --  --  --  --  --  --  75  --  --  --  --   02/15/21 0738  98 8 °F (37 1 °C)  95  24Abnormal   87/59Abnormal   --  94 %  --  36  4 L/min  Nasal cannula  Sitting   02/15/21 0500  --  87  --  97/54  --  95 %  --  --  --  --  Lying   02/15/21 0430  --  87  --  89/54Abnormal   --  --  --  --  --  --  Lying   02/15/21 0334  99 7 °F (37 6 °C)  92  --  94/52  --  --  --  --  --  --  Lying   02/15/21 03:33:39  99 7 °F (37 6 °C)  --  18  --  --  --  --  --  --  --  --   02/15/21 0300  --  92  --  91/51  --  --  --  --  --  --  Lying   02/15/21 0241  --  --  --  91/52  --  --  --  --  --  --  Lying 02/15/21 0139  --  95  --  129/56  --  94 %  --  --  --  --  Lying   02/15/21 0012  --  94  --  83/46Abnormal   --  --  --  --  --  --  Lying       Medications:   Scheduled Medications:  albumin human, 25 g, Intravenous, Once  cholecalciferol, 2,000 Units, Oral, Daily  Diclofenac Sodium, 4 g, Topical, 4x Daily  enoxaparin, 1 mg/kg, Subcutaneous, Q12H LIZETT  fluconazole, 400 mg, Oral, Q24H  furosemide, 40 mg, Oral, Daily  glycopyrrolate, 1 mg, Oral, TID  insulin glargine, 8 Units, Subcutaneous, HS  insulin lispro, 1-5 Units, Subcutaneous, TID AC  lisinopril, 10 mg, Oral, Daily  methadone, 70 mg, Oral, Daily  SAMREEN ANTIFUNGAL, 1 application, Topical, TID  multivitamin-minerals, 1 tablet, Oral, Daily  omeprazole (PRILOSEC) suspension 2 mg/mL, 20 mg, Oral, Early Morning  polyethylene glycol, 17 g, Oral, Daily  senna, 1 tablet, Oral, BID  sodium chloride, 1,000 mL, Intravenous, Once    Continuous IV Infusions:        PRN Meds:  acetaminophen, 650 mg, Rectal, Q6H PRN  acetaminophen, 650 mg, Oral, Q6H PRN - x 1 2/16, 2/17  albuterol, 2 puff, Inhalation, Q4H PRN  bisacodyl, 10 mg, Rectal, Daily PRN  Lidocaine Viscous HCl, 15 mL, Swish & Spit, 4x Daily PRN  menthol-methyl salicylate, , Apply externally, 4x Daily PRN  ondansetron, 4 mg, Intravenous, Q6H PRN  phenol, 1 spray, Mouth/Throat, Q2H PRN  sodium chloride, 1 spray, Each Nare, Q1H PRN    Discharge Plan: rehab, LTACH denied  Network Utilization Review Department  ATTENTION: Please call with any questions or concerns to [phone] and carefully listen to the prompts so that you are directed to the right person  All voicemails are confidential   Dixie Valles all requests for admission clinical reviews, approved or denied determinations and any other requests to dedicated fax number below belonging to the campus where the patient is receiving treatment   List of dedicated fax numbers for the Facilities:  FACILITY NAME UR FAX NUMBER   ADMISSION DENIALS (Administrative/[facility]) [phone]   [address] (Maternity/NICU/Pediatrics) 261 [facility],[address] [facility] 40 125 [facility]  [phone]   Ganesh Robles 3226 (  Kana Epps "Cara" 103) [address] [place] [address] Angeline Almeida 1481 [phone]   [address]51 [phone]

## 2021-02-17 NOTE — WOUND OSTOMY CARE
Progress Note - Wound   August Bio 56 y o  male MRN: [id number]  Unit/Bed#: [facility] 717-01 Encounter: [id number]        Assessment:   Patient seen this morning for continue skin and wound care   He is awake, alert in bed with no complaints, he is incontinent of bowel and bladder, with decrease mobility  Patient agreeable for wound care nurse to re-assess skin and provide treattment  Findings:  1-Sacral DTI fully evolved and presenting with 100% yellow slough at base, wound bed is non indurated with radiating erythema to immediate periwound  2,3 R hand base of 2nd and 5th fingers DTI healing slowly  RLE s/p AKA and L leg remains intact  We are changing wound care orders to sacro-buttock are, orders adjusted  Skin/Wound care orders:   1-Cleanse sacro-buttock wound with soap and water, dry thoroughly  Apply skin prep to periwound, apply triad paste to wound bed, cover treated area with Hydrocolloid, change every three days and as needed  2-Elevate heel to offload pressure  3-Ehob cushion in chair when out of bed  4-Moisturize skin daily with skin nourishing cream    5-Turn/reposition q2h or when medically stable for pressure re-distribution on skin  6-Allevyn foam to heel, juan w/P, peel foam check skin integrity q-shift  Change q3d    7-Cleanse base of R 5th finger healing DTI with NSS dermagran, band-aid  Change every three days     8-Place patient on P500 alternating air mattress  Vitals: Blood pressure 142/73, pulse 82, temperature 98 6 °F (37 °C), resp  rate 18, height 5' 6" (1 676 m), weight 63 7 kg (140 lb 6 9 oz), SpO2 92 %  ,Body mass index is 22 67 kg/m²  Wound 01/12/21 Pressure Injury Sacrum Mid (Active)   Wound Image    02/17/21 1202   Wound Description Slough; Yellow 02/17/21 1202   Pressure Injury Stage DTPI 02/17/21 1202   Renuka-wound Assessment Intact 02/17/21 1202   Wound Length (cm) 9 cm 02/17/21 1202   Wound Width (cm) 5 5 cm 02/17/21 1202   Wound Surface Area (cm^2) 49 5 cm^2 02/17/21 1202   Wound Site Closure None 02/16/21 0800   Drainage Amount Scant 02/17/21 1202   Drainage Description Serous 02/17/21 1202   Treatments Site care 02/17/21 1202   Dressing Protective barrier;Open to air 02/16/21 0800   Dressing Changed Changed 02/01/21 1750   Patient Tolerance Tolerated well 02/17/21 1202   Dressing Status Clean;Dry; Intact 02/13/21 0800   Wound 01/19/21 Pressure Injury Finger (Comment which one) Right;Posterior (Active)   Wound Image    02/17/21 1154   Wound Description Black 02/17/21 1154   Pressure Injury Stage 1 02/17/21 1154   Renuka-wound Assessment Intact 02/17/21 1154   Wound Length (cm) 1 cm 02/17/21 1154   Wound Width (cm) 1 5 cm 02/17/21 1154   Wound Surface Area (cm^2) 1 5 cm^2 02/17/21 1154   Wound Site Closure Open to air 02/16/21 0800   Drainage Amount None 02/17/21 1154   Treatments Cleansed;Site care 02/16/21 2000   Dressing Open to air 02/17/21 1154   Patient Tolerance Tolerated well 02/17/21 1154   Dressing Status Clean;Dry; Intact 02/15/21 2000       Wound 02/03/21 Pressure Injury Finger (Comment which one) Posterior;Right (Active)   Wound Image    02/17/21 1154   Wound Description Beefy red 02/17/21 1154   Pressure Injury Stage DTPI 02/17/21 1154   Renuka-wound Assessment Intact 02/17/21 1154   Wound Length (cm) 0 6 cm 02/17/21 1154   Wound Width (cm) 1 cm 02/17/21 1154   Wound Depth (cm) 0 1 cm 02/17/21 1154   Wound Surface Area (cm^2) 0 6 cm^2 02/17/21 1154   Wound Volume (cm^3) 0 06 cm^3 02/17/21 1154   Calculated Wound Volume (cm^3) 0 06 cm^3 02/17/21 1154   Drainage Amount None 02/16/21 0800   Treatments Site care 02/17/21 1154   Dressing Darol Ventura gauze 02/17/21 1154   Patient Tolerance Tolerated well 02/17/21 1154         Wound care to continue following, please tiger text wound care team with questions and concerns          Trice Vieyra RN, BSN, [facility]

## 2021-02-17 NOTE — PROGRESS NOTES
Progress Note Amber Jimenez 1960, 56 y o  male MRN: [id number]    [facility] Encounter: [id number]    Primary Care Provider: VERONICA Branch   Date and time admitted to hospital: 1/13/2021  2:28 PM        * Pneumonia due to COVID-19 virus  Assessment & Plan  · Tested positive for covid 12/31/2020  · S/p completion of covid tx  · S/p completion of IV abx for possible superimposed bacterial pna  · Considered recovered; off isolation    Left Hydropneumothorax  Assessment & Plan  Status post pigtail  On 2/17/21  Currently on 2 L oxygen via NC with SpO2 95% - respiratory status slowly improving  Pulmonology and thoracic surgery following  Follow chest x-ray and lateral status post pigtail removal this morning  Follow-up in the thoracic office 1 month after the discharge from the hospital with chest x-ray PA and lateral results    Prolonged Q-T interval on ECG  Assessment & Plan  -the patient has prolonged QTC of 487, patient is on methadone and fluconazole  -Patient is on methadone for years  -Patient fluconazole was switched to micafungin by ID    Acute respiratory failure with hypoxia (HCC)  Assessment & Plan  · Secondary to COVID 19  · Extubated 1/25  · O2 requirements worsen because of hydropneumothorax  Now improved and back to 2-3 L  Ischemia of right lower extremity with suspected rhabdomyolysis  Assessment & Plan  · Suspected embolic from aortic thrombus  · 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  · Continue therapeutic Lovenox , pain management  · Currently stable from a vascular surgery standpoint    Acute encephalopathy  Assessment & Plan  Now resolved  Most likely toxic metabolic due to methadone and gabapentin  Continue to hold gabapentin  Methadone restarted at home dose as patient is on chronic methadone therapy and is requesting for his pain  Avoid any further sedating medications      Fever  Assessment & Plan  Has been afebrile over the last few days, no clinical evidence of infection  Pleural fluid cultures growing Candida  Patient was on fluconazole 400 mg daily  Today patient has prolonged , fluconazole was switched to micafungin per ID  Right femoral vein DVT (HCC)  Assessment & Plan  Continue therapeutic Lovenox  Urinary retention  Assessment & Plan  Resolved    Edema of right upper extremity  Assessment & Plan  · +2 pitting edema also with significant weakness/paresis   · S/p Venous duplex on 01/26 negative for acute or chronic DVT   · Resumed back on lasix with some improvement    Oropharyngeal dysphagia  Assessment & Plan  · Extubated 1/25 and required NGT for nutrition   · Speech therapy following   · S/p barium swallow - official report pending  · Continue modified diet  · Continue robinul  · S/p ENT consult, no vocal cord motion impairment  · Status post video barium swallow on 02/16  Muscle weakness of right upper extremity  Assessment & Plan  · Profound weakness, predominantly in right upper extremity, especially due to critical illness and steroid use  · During hospitalization, stroke alert was called because of concern for worsening right upper extremity weakness  CT head showed questionable hypodensity but no evidence of acute stroke or bleeding  · Patient currently on therapeutic Lovenox  · Strength currently 2 to 3/5 at baseline  · Evaluated by Neurology  No further inpatient workup required  Anemia  Assessment & Plan  · Secondary to critical illness, no sign of acute blood loss  · Neg stool occult  · Hemoglobin stable around his baseline  · Continue to monitor and transfuse if < 7    Aortic thrombus (HCC)  Assessment & Plan  · With embolic event that compromised right femoral artery  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    S/p R AKA formalization 1/18   · Continue therapeutic lovenox  · F/u with vascular surgery as outpatient    Type 2 diabetes mellitus, with long-term current use of insulin [facility])  Assessment & Plan  Lab Results   Component Value Date    HGBA1C 11 6 (H) 2020       Recent Labs     21  0814 21  0912 21  1018 21  1057   POCGLU 71 95 91 78       Blood Sugar Average: Last 72 hrs:  (P) 129 [id number]     · BBG on the lower side, patient does not eating much due to dysphagia  · Continue Lantus 10 units + SSI  · Discontinue 3 units Humalog t i d  With meals  HTN (hypertension)  Assessment & Plan  · Stable  · Continue lisinopril, lasix      VTE Pharmacologic Prophylaxis:   Pharmacologic: Enoxaparin (Lovenox)  Mechanical VTE Prophylaxis in Place: Yes    Patient Centered Rounds: I have performed bedside rounds with nursing staff today  Discussions with Specialists or Other Care Team Provider:  Yes    Education and Discussions with Family / Patient:  Yes    Time Spent for Care: 45 minutes  More than 50% of total time spent on counseling and coordination of care as described above  Current Length of Stay: 35 day(s)    Current Patient Status: Inpatient   Certification Statement: The patient will continue to require additional inpatient hospital stay due to Left hydropneumothorax, acute hypoxic respiratory failure from recent COVID infection    Discharge Plan / Estimated Discharge Date:  Yes      Code Status: Level 1 - Full Code      Subjective:   Patient was seen and examined at bedside  The patient complains of pain in his amputated limb but denies any fever, chills, cough chest pain, palpitation, abd pain  Patient has some nausea this morning  Objective:     Vitals:   Temp (24hrs), Av 3 °F (36 8 °C), Min:97 6 °F (36 4 °C), Max:99 3 °F (37 4 °C)    Temp:  [97 6 °F (36 4 °C)-99 3 °F (37 4 °C)] 97 6 °F (36 4 °C)  HR:  [80-99] 87  Resp:  [17-18] 18  BP: (110-136)/(73-87) 136/87  SpO2:  [94 %-98 %] 97 %  Body mass index is 22 67 kg/m²       Input and Output Summary (last 24 hours):     No intake or output data in the 24 hours ending 21 1510    Physical Exam:     Physical Exam  General: Elderly patient lying in bed, breathing on 3 L of oxygen via NC, no acute distress  HEENT: NC/AT, PERRL, EOM - normal  Neck: Supple  Pulm/Chest: Normal chest wall expansion, decreased breath sounds on both side especially on left lower zone, no wheezing/rhonchi or crackles appreciated  CVS: RRR, normal S1&S2, no murmur appreciated, capillary refill <2s  Abd: soft, non tender, non distended, bowel sounds +  MSK: move all 4 extremities spontaneously  Skin: warm  CNS: AAOx3, no acute focal neuro deficit, Motor strength-decreased      Additional Data:     Labs:    Results from last 7 days   Lab Units 02/17/21  0935  02/16/21  0532   WBC Thousand/uL  --   --  7 21   HEMOGLOBIN g/dL 8 7*   < > 7 7*   HEMATOCRIT % 28 4*   < > 25 4*   PLATELETS Thousands/uL  --   --  261   NEUTROS PCT %  --   --  76*   LYMPHS PCT %  --   --  11*   MONOS PCT %  --   --  9   EOS PCT %  --   --  2    < > = values in this interval not displayed  Results from last 7 days   Lab Units 02/16/21  0532   POTASSIUM mmol/L 3 6   CHLORIDE mmol/L 101   CO2 mmol/L 32   BUN mg/dL 16   CREATININE mg/dL 0 46*   CALCIUM mg/dL 7 7*   ALK PHOS U/L 80   ALT U/L 10*   AST U/L 13           * I Have Reviewed All Lab Data Listed Above  * Additional Pertinent Lab Tests Reviewed: Jaime 64 Admission Reviewed    Imaging:    Imaging Reports Reviewed Today Include:  Chest x-ray portable  Imaging Personally Reviewed by Myself Includes:  Chest x-ray      Recent Cultures (last 7 days):     Results from last 7 days   Lab Units 02/14/21  0218 02/14/21  0217   BLOOD CULTURE  No Growth at 72 hrs  No Growth at 72 hrs         Last 24 Hours Medication List:   Current Facility-Administered Medications   Medication Dose Route Frequency Provider Last Rate    acetaminophen  650 mg Rectal Q6H PRN Sacha Mancia MD      acetaminophen  650 mg Oral Q6H PRN VERONICA Kennedy      albumin human  25 g Intravenous Once Sacha Mancia MD Stopped (02/15/21 1715)    albuterol  2 puff Inhalation Q4H PRN Artice Chandana, DO      bisacodyl  10 mg Rectal Daily PRN VERONICA Davies      cholecalciferol  2,000 Units Oral Daily Shannan A Sedora, CRNP      Diclofenac Sodium  4 g Topical 4x Daily Shannan A Sedora, CRNP      enoxaparin  1 mg/kg Subcutaneous Q12H [address] Jewell Avina MD      fluconazole  400 mg Oral Q24H Anna Diaz MD      furosemide  40 mg Oral Daily Artice Chandana, DO      glycopyrrolate  1 mg Oral TID Shannan A Sedora, CRNP      insulin glargine  10 Units Subcutaneous HS June Becerra MD      insulin lispro  1-5 Units Subcutaneous TID AC June Becerra MD      Lidocaine Viscous HCl  15 mL Swish & Spit 4x Daily PRN Shannan A Sedora, CRNP      lisinopril  10 mg Oral Daily Shannan A Sedora, CRNP      menthol-methyl salicylate   Apply externally 4x Daily PRN VERONICA Davies      methadone  70 mg Oral Daily MD Yesica Erickson [facility] ANTIFUNGAL  1 application Topical TID Hetul Mccall, DO      multivitamin-minerals  1 tablet Oral Daily Shannan A Sedora, VERONICA      omeprazole (PRILOSEC) suspension 2 mg/mL  20 mg Oral Early Morning Shannan A Sedora, CRJASPER      ondansetron  4 mg Intravenous Q6H PRN Toby Beyer PA-C      phenol  1 spray Mouth/Throat Q2H PRN Shannan A Sedora, CRJASPER      polyethylene glycol  17 g Oral Daily Shannan A Sedora, CRNP      senna  1 tablet Oral BID Shannan A Sedora, CRNP      sodium chloride  1 spray Each Nare Q1H PRN Sherrie Barbosa DO      sodium chloride  1,000 mL Intravenous Once Jewell Avina MD Stopped (02/15/21 1715)        Today, Patient Was Seen By: Kennedi Tomlin MD    ** Please Note: Dragon 360 Dictation voice to text software may have been used in the creation of this document   **

## 2021-02-17 NOTE — PROGRESS NOTES
Progress Note - Thoracic Surgery   Criselda Maguire 56 y o  male MRN: [id number]  Unit/Bed#: [facility] 717-01 Encounter: [id number]    Assessment:  56 y o  M with multiple medical comorbidities including PAD s/p recent AKA, hepatitis C, IVDU on methadone, recent COVID 23 pneumonia complicated by prolonged intubation with L hydropneumothorax ex vacuo despite CT placement     L CT (sxn, -AL) no drainage recorded at this time    Plan:  · No acute surgical intervention  Will need outpatient follow up to revisit intervention following his acute hospitalization  · Maintain L chest tube to suction  Monitor air leak and output  · Continue Abx/anti-fungals per ID  · Remainder of care per primary team     Subjective/Objective     Subjective: No acute events  Afebrile  Pain well controlled  Denies shortness of breath  Objective:     Blood pressure 122/73, pulse 86, temperature 99 3 °F (37 4 °C), resp  rate 17, height 5' 6" (1 676 m), weight 63 7 kg (140 lb 6 9 oz), SpO2 95 %  ,Body mass index is 22 67 kg/m²  Intake/Output Summary (Last 24 hours) at 2/17/2021 0602  Last data filed at 2/16/2021 1405  Gross per 24 hour   Intake 220 ml   Output 600 ml   Net -380 ml       Invasive Devices     Peripheral Intravenous Line            Peripheral IV 02/14/21 Right Antecubital 3 days    Peripheral IV 02/16/21 Distal;Left;Upper;Ventral (anterior) Arm less than 1 day          Drain            Chest Tube 1 Left Midaxillary;Pleural 10 Fr  6 days                Physical Exam:   GEN: NAD  HEENT: MMM  CV: warm/well perfused  Lung: normal effort  L CT sxn -AL serous drainage   Ab: Soft, NT/ND  Extrem: No CCE  Neuro:  A+Ox3, motor and sensation grossly intact      Lab, Imaging and other studies:  CBC:   Lab Results   Component Value Date    HGB 8 1 (L) 02/16/2021    HCT 26 8 (L) 02/16/2021   , CMP:   No results found for: SODIUM, K, CL, CO2, ANIONGAP, BUN, CREATININE, GLUCOSE, CALCIUM, AST, ALT, ALKPHOS, PROT, BILITOT, EGFR  VTE Pharmacologic Prophylaxis: Enoxaparin (Lovenox)  VTE Mechanical Prophylaxis: sequential compression device

## 2021-02-17 NOTE — PROGRESS NOTES
Progress Note - Infectious Disease   Gavin Russell 56 y o  male MRN: [id number]  Unit/Bed#: [facility] 717-01 Encounter: [id number]      Impression/Plan:  1  Left-sided candidal empyema-unclear primary process   No clear evidence of an esophageal perforation at least clinically and radiographically   Consideration for the possibility of a pneumonia with colonization of the airways with Candida and a spontaneous pneumothorax contaminating the pleural space   Consideration for the possibility of a transient fungemia which is now resolved   Fortunately the patient remains hemodynamically stable   He has undergone chest tube drainage and is receiving tPA installations  He has clinically improved  Tolerating oral antifungals without difficulty  -continue fluconazole  -check EKG to make sure not prolonging QT due to interaction with methadone   -chest tube drainage  -possible for chest tube removal soon  -may eventually need decortication  -check barium swallow to make sure no leak  -recheck CBC with diff and CMP  -pulmonary follow-up     2  Fever-suspect all secondary to the fungal empyema   The blood cultures remain negative and the patient remains hemodynamically stable   He seems to be tolerating the antifungals without difficulty    No recurrence   -antifungals as above  -follow-up blood cultures  -no additional ID workup for now     3  COVID 19 pneumonia-status post the initiation of the severe treatment protocol   Patient has now completed that treatment   He did not receive any antivirals such as remdesivir or plasma due to the late phase of illness in which treatment was initiated   Is much improved clinically  -no additional COVID-19 directed treatment  -monitor respiratory status     4  Acute hypoxic respiratory failure-likely multifactorial including COVID-19 pneumonia, and now hydropneumothorax in the setting of presumptive candidal empyema   The patient is still requiring oxygen by nasal cannula intermittently  -O2 support  -wean off oxygen as able  -no additional ID workup for now     5  Acute right lower extremity ischemia-with suspected rhabdomyolysis   The patient is now status 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  Fior Tinsley has been continuing on anticoagulation  No local wound cellulitis noted  -no additional directed antibiotics  -vascular follow-up  -local wound care     6  Diabetes mellitus-type 2 with long-term insulin use and hyperglycemia    Discussed the above management plan with the primary service    Antibiotics:  Fluconazole 2  Antifungals 5    Subjective:  Patient has no fever, chills, sweats; no nausea, vomiting, diarrhea; no cough, shortness of breath; no pain  No new symptoms  He seems in good spirits  He is back on O2 by nasal cannula  Objective:  Vitals:  Temp:  [98 °F (36 7 °C)-99 3 °F (37 4 °C)] 98 °F (36 7 °C)  HR:  [] 92  Resp:  [17-18] 18  BP: (110-156)/(73-86) 135/82  SpO2:  [93 %-100 %] 94 %  Temp (24hrs), Av 7 °F (37 1 °C), Min:98 °F (36 7 °C), Max:99 3 °F (37 4 °C)  Current: Temperature: 98 °F (36 7 °C)    Physical Exam:   General Appearance:  Alert, interactive, nontoxic, no acute distress  Throat: Oropharynx moist without lesions  Lungs:   Decreased breath sounds bilaterally; no wheezes, rhonchi or rales; respirations unlabored  Left-sided chest tube in place   Heart:  RRR; no murmur, rub or gallop   Abdomen:   Soft, non-tender, non-distended, positive bowel sounds  Extremities: No clubbing, cyanosis  Right lower extremity status post AKA   Skin: No new rashes or lesions  No draining wounds noted         Labs, Imaging, & Other studies:   All pertinent labs and imaging studies were personally reviewed  Results from last 7 days   Lab Units 21  0935 21  1120 21  0532  02/15/21  0558 21   WBC Thousand/uL  --   --   --  7 21  --  9 76 15 91*   HEMOGLOBIN g/dL 8 7* 8 1* 8 2* 7 7*   < > 8 0* 10 3*   PLATELETS Thousands/uL  --   --   --  261  --  287 368    < > = values in this interval not displayed  Results from last 7 days   Lab Units 02/16/21  0532 02/15/21  0558 02/13/21 2036   SODIUM mmol/L 137 141 139   POTASSIUM mmol/L 3 6 3 4* 3 3*   CHLORIDE mmol/L 101 104 101   CO2 mmol/L 32 36* 37*   BUN mg/dL 16 17 11   CREATININE mg/dL 0 46* 0 49* 0 50*   EGFR ml/min/1 73sq m 120 117 116   CALCIUM mg/dL 7 7* 7 8* 8 9   AST U/L 13  --   --    ALT U/L 10*  --   --    ALK PHOS U/L 80  --   --      Results from last 7 days   Lab Units 02/14/21  0218 02/14/21  0217 02/10/21  1253   BLOOD CULTURE  No Growth at 72 hrs   No Growth at 72 hrs   --    GRAM STAIN RESULT   --   --  1+ Polys  No organisms seen   BODY FLUID CULTURE, STERILE   --   --  Few Colonies of Candida albicans*     Results from last 7 days   Lab Units 02/13/21 2036 02/11/21  0536   PROCALCITONIN ng/ml 0 30* 0 27*        chest x-ray-slightly increased left pleural effusion    Images personally reviewed by me in PACS

## 2021-02-17 NOTE — PLAN OF CARE
Problem: OCCUPATIONAL THERAPY ADULT  Goal: Performs self-care activities at highest level of function for planned discharge setting  See evaluation for individualized goals  Description: Treatment Interventions: ADL retraining, Functional transfer training, UE strengthening/ROM, Endurance training, Cognitive reorientation, Patient/family training, Equipment evaluation/education, Compensatory technique education, Continued evaluation, Activityengagement          See flowsheet documentation for full assessment, interventions and recommendations  Outcome: Progressing  Note: Limitation: Decreased ADL status, Decreased Safe judgement during ADL, Decreased UE strength, Decreased UE ROM, Decreased cognition, Decreased endurance, Decreased self-care trans, Decreased high-level ADLs, Decreased sensation, Decreased fine motor control, Visual deficit  Prognosis: Fair  Assessment: (P) Pt is seen for OT treatment session 2/17/21 including interventions to: increase activity tolerance, increase endurance, improve dynamic sitting balance, increase functional use R UE, and improve trunk/postural strength  In comparison to previous session, pt with improved bed mobility and improved use of R UE  Pt reports his primary goal is to be able to use his R hand  Pt continues to function below his functional baseline, and is to continue to benefit from skilled occupational therapy services while in the hospital to maximize functioning and independence with daily activities  OT to increase frequency to 3-5x/wk at this time    OT to continue to recommend STR upon d/c      OT Discharge Recommendation: Post-Acute Rehabilitation Services  OT - OK to Discharge: (P) Yes

## 2021-02-17 NOTE — RESTORATIVE TECHNICIAN NOTE
Restorative Specialist Mobility Note          Repositioned: Other (Comment)(Rep /sat pt upright in bed  Bed alarm on   Pt callbell, phone/tray within reach )          Kimberlyn GUALLPA, Restorative Technician, [employer]

## 2021-02-17 NOTE — QUICK NOTE
75-year-old male with past medical history of type 2 diabetes on long-term use of insulin who is admitted for COVID pneumonia, and hospital stays complicated by left hydropneumothorax  Patient blood sugars were reviewed  Which is tightly controlled  Currently on Lantus 10 units q h s  And Humalog 3 units t i d  A c   Discontinue Humalog and decrease Lantus to 8 units q h s  And continue sliding scale    Continue to monitor blood sugar over time and make adjustments to the regimen if necessary

## 2021-02-18 ENCOUNTER — APPOINTMENT (INPATIENT)
Dept: RADIOLOGY | Facility: HOSPITAL | Age: 61
DRG: 853 | End: 2021-02-18
Attending: INTERNAL MEDICINE
Payer: COMMERCIAL

## 2021-02-18 LAB
ANION GAP SERPL CALCULATED.3IONS-SCNC: 4 MMOL/L (ref 4–13)
ATRIAL RATE: 74 BPM
BUN SERPL-MCNC: 9 MG/DL (ref 5–25)
CALCIUM SERPL-MCNC: 8.3 MG/DL (ref 8.3–10.1)
CHLORIDE SERPL-SCNC: 99 MMOL/L (ref 100–108)
CO2 SERPL-SCNC: 35 MMOL/L (ref 21–32)
CREAT SERPL-MCNC: 0.35 MG/DL (ref 0.6–1.3)
GFR SERPL CREATININE-BSD FRML MDRD: 134 ML/MIN/1.73SQ M
GLUCOSE SERPL-MCNC: 100 MG/DL (ref 65–140)
GLUCOSE SERPL-MCNC: 118 MG/DL (ref 65–140)
GLUCOSE SERPL-MCNC: 60 MG/DL (ref 65–140)
GLUCOSE SERPL-MCNC: 66 MG/DL (ref 65–140)
GLUCOSE SERPL-MCNC: 69 MG/DL (ref 65–140)
GLUCOSE SERPL-MCNC: 78 MG/DL (ref 65–140)
GLUCOSE SERPL-MCNC: 83 MG/DL (ref 65–140)
GLUCOSE SERPL-MCNC: 84 MG/DL (ref 65–140)
HCT VFR BLD AUTO: 25.9 % (ref 36.5–49.3)
HCT VFR BLD AUTO: 31.7 % (ref 36.5–49.3)
HGB BLD-MCNC: 7.9 G/DL (ref 12–17)
HGB BLD-MCNC: 9.4 G/DL (ref 12–17)
P AXIS: 57 DEGREES
POTASSIUM SERPL-SCNC: 3.2 MMOL/L (ref 3.5–5.3)
PR INTERVAL: 140 MS
QRS AXIS: 27 DEGREES
QRSD INTERVAL: 72 MS
QT INTERVAL: 446 MS
QTC INTERVAL: 495 MS
SODIUM SERPL-SCNC: 138 MMOL/L (ref 136–145)
T WAVE AXIS: 49 DEGREES
VENTRICULAR RATE: 74 BPM

## 2021-02-18 PROCEDURE — 94668 MNPJ CHEST WALL SBSQ: CPT

## 2021-02-18 PROCEDURE — 80048 BASIC METABOLIC PNL TOTAL CA: CPT | Performed by: INTERNAL MEDICINE

## 2021-02-18 PROCEDURE — 02HV33Z INSERTION OF INFUSION DEVICE INTO SUPERIOR VENA CAVA, PERCUTANEOUS APPROACH: ICD-10-PCS | Performed by: INTERNAL MEDICINE

## 2021-02-18 PROCEDURE — 85014 HEMATOCRIT: CPT | Performed by: INTERNAL MEDICINE

## 2021-02-18 PROCEDURE — 99232 SBSQ HOSP IP/OBS MODERATE 35: CPT | Performed by: INTERNAL MEDICINE

## 2021-02-18 PROCEDURE — C1751 CATH, INF, PER/CENT/MIDLINE: HCPCS

## 2021-02-18 PROCEDURE — 82948 REAGENT STRIP/BLOOD GLUCOSE: CPT

## 2021-02-18 PROCEDURE — 93010 ELECTROCARDIOGRAM REPORT: CPT | Performed by: INTERNAL MEDICINE

## 2021-02-18 PROCEDURE — 36569 INSJ PICC 5 YR+ W/O IMAGING: CPT

## 2021-02-18 PROCEDURE — 74220 X-RAY XM ESOPHAGUS 1CNTRST: CPT

## 2021-02-18 PROCEDURE — 93005 ELECTROCARDIOGRAM TRACING: CPT

## 2021-02-18 PROCEDURE — 73060 X-RAY EXAM OF HUMERUS: CPT

## 2021-02-18 PROCEDURE — 94760 N-INVAS EAR/PLS OXIMETRY 1: CPT

## 2021-02-18 PROCEDURE — 94664 DEMO&/EVAL PT USE INHALER: CPT

## 2021-02-18 PROCEDURE — 85018 HEMOGLOBIN: CPT | Performed by: INTERNAL MEDICINE

## 2021-02-18 RX ORDER — INSULIN GLARGINE 100 [IU]/ML
4 INJECTION, SOLUTION SUBCUTANEOUS
Status: DISCONTINUED | OUTPATIENT
Start: 2021-02-18 | End: 2021-02-25 | Stop reason: HOSPADM

## 2021-02-18 RX ORDER — POTASSIUM CHLORIDE 20 MEQ/1
40 TABLET, EXTENDED RELEASE ORAL ONCE
Status: COMPLETED | OUTPATIENT
Start: 2021-02-18 | End: 2021-02-18

## 2021-02-18 RX ADMIN — METHADONE HYDROCHLORIDE 70 MG: 10 TABLET ORAL at 09:09

## 2021-02-18 RX ADMIN — ENOXAPARIN SODIUM 70 MG: 80 INJECTION SUBCUTANEOUS at 09:02

## 2021-02-18 RX ADMIN — MICONAZOLE NITRATE 1 APPLICATION: 20 CREAM TOPICAL at 22:05

## 2021-02-18 RX ADMIN — FUROSEMIDE 40 MG: 40 TABLET ORAL at 09:03

## 2021-02-18 RX ADMIN — GLYCOPYRROLATE 1 MG: 1 TABLET ORAL at 21:59

## 2021-02-18 RX ADMIN — LISINOPRIL 10 MG: 10 TABLET ORAL at 09:13

## 2021-02-18 RX ADMIN — GLYCOPYRROLATE 1 MG: 1 TABLET ORAL at 09:05

## 2021-02-18 RX ADMIN — IOHEXOL 30 ML: 350 INJECTION, SOLUTION INTRAVENOUS at 09:54

## 2021-02-18 RX ADMIN — Medication 20 MG: at 05:24

## 2021-02-18 RX ADMIN — ENOXAPARIN SODIUM 70 MG: 80 INJECTION SUBCUTANEOUS at 21:47

## 2021-02-18 RX ADMIN — SENNOSIDES 8.6 MG: 8.6 TABLET, FILM COATED ORAL at 09:03

## 2021-02-18 RX ADMIN — POTASSIUM CHLORIDE 40 MEQ: 1500 TABLET, EXTENDED RELEASE ORAL at 09:03

## 2021-02-18 RX ADMIN — Medication 2000 UNITS: at 09:03

## 2021-02-18 RX ADMIN — MICONAZOLE NITRATE 1 APPLICATION: 20 CREAM TOPICAL at 09:04

## 2021-02-18 RX ADMIN — MICAFUNGIN SODIUM 100 MG: 50 INJECTION, POWDER, LYOPHILIZED, FOR SOLUTION INTRAVENOUS at 18:15

## 2021-02-18 RX ADMIN — GLYCOPYRROLATE 1 MG: 1 TABLET ORAL at 18:07

## 2021-02-18 RX ADMIN — Medication 1 TABLET: at 09:03

## 2021-02-18 RX ADMIN — INSULIN GLARGINE 4 UNITS: 100 INJECTION, SOLUTION SUBCUTANEOUS at 23:20

## 2021-02-18 NOTE — RESTORATIVE TECHNICIAN NOTE
Restorative Specialist Mobility Note        Repositioned: Other (Comment)(Rep /sat pt upright in bed  Bed alarm on   Pt callbell, phone/tray within reach )        Heels/Feet: Left multi-podus boot  Range of Motion: Left leg(Assisted pt with ROM exercises at bedlevel )       Patricia Nevarez BS, Restorative Technician, [employer]

## 2021-02-18 NOTE — ASSESSMENT & PLAN NOTE
Chronic methadone use  Methadone was held yesterday due to lethargy yesterday  Restarted methadone today as patient requested due to significant limb pain  And he is on chronic methadone therapy  Will continue to hold gabapentin for now to avoid over sedation and lethargy  Fluconazole discontinued due to prolonged QTC with concomitant methadone fluconazole use

## 2021-02-18 NOTE — ASSESSMENT & PLAN NOTE
Candida empyema  Status post pigtail  On 2/17/21  Now on room air  Pulmonology and thoracic surgery following  Fluconazole changed to micafungin due to prolonged QTC along with methadone use  PICC line consent obtained and will need PICC line for IV micafungin for 3-4 weeks  Discussed with ID, appreciate input    Follow-up in the thoracic office 1 month after the discharge from the hospital with chest x-ray PA and lateral results

## 2021-02-18 NOTE — ASSESSMENT & PLAN NOTE
· Suspected embolic from aortic thrombus  · 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  · Continue therapeutic Lovenox , pain management  Will need to discuss regarding oral anticoagulation on discharge    · Currently stable from a vascular surgery standpoint

## 2021-02-18 NOTE — CASE MANAGEMENT
CM was informed that pt will require 4 weeks of IV abx  Updated clinical information sent to [facility], [facility] via [facility]

## 2021-02-18 NOTE — PROGRESS NOTES
Progress Note - Pulmonary   Lelan Black 56 y o  male MRN: [id number]  Unit/Bed#: [facility] 717-01 Encounter: [id number]      Assessment:     Acute hypoxic respiratory failure- secondary to moderate pneumothorax superimposed on bilateral ground-glass opacification related to prior COVID infection and hydropneumothorax ex vacuo  - s/p left 10F IR chest tube on 2/11 - removed by thoracics on 2/17   - CXR on 2/17 AM showed persistent L PTX slightly increased as per report with persistent left pleural effusion   - cultures grew out few candida albicans - now on antifungals as per ID   - thoracic surgery following for trapped lung and decortication - no surgical intervention at present likely outpatient   - s/p tpa/dornase x 2 doses over weekend   - off oxygen   - swallow study to check for occult aspiration showed mild pharyngeal dysphagia   - swallow with gastrograffin to check for esophageal communication with fistula pending  - flutter valve for productive cough     COVID-19 pneumonia  - initially diagnosed on 12/31/2020 and was asymptomatic  Returned on 01/11 with symptoms of SOB requiring intubation on 01/12/2021 and was extubated on 01/25/2021  History of tobacco abuse    Hepatitis-C    Right lower extremity emboli-status post embolectomy and below-the-knee amputation   - on therapeutic Lovenox     We will sign off at this time given he is on room air and chest tube has been removed  Recommend increased mobility to help with secretions  He will follow up with thoracics after rehab  Please do not hesitate to reach out for any additional questions  Subjective:   Patient seen/examined this AM  He now has a more productive cough but breathing well  Review of Systems   Respiratory: Positive for cough  All other systems reviewed and are negative        Objective:     Vitals:    02/18/21 0727 02/18/21 0836 02/18/21 0913 02/18/21 0916   BP: 123/70  135/74 135/74   BP Location: Right arm      Pulse: 71   79 Resp: 18      Temp: (!) 97 4 °F (36 3 °C)      TempSrc: Oral      SpO2: 99% 94%  95%   Weight:       Height:               Intake/Output Summary (Last 24 hours) at 2/18/2021 0933  Last data filed at 2/18/2021 0800  Gross per 24 hour   Intake 118 ml   Output 350 ml   Net -232 ml         Physical Exam  Vitals signs reviewed  HENT:      Head: Normocephalic  Nose: Nose normal       Mouth/Throat:      Mouth: Mucous membranes are moist    Eyes:      Pupils: Pupils are equal, round, and reactive to light  Cardiovascular:      Rate and Rhythm: Normal rate and regular rhythm  Pulses: Normal pulses  Heart sounds: Normal heart sounds  Pulmonary:      Effort: Pulmonary effort is normal       Breath sounds: Rhonchi present  Comments: Diminished breath sounds b/l  Abdominal:      General: Abdomen is flat  Palpations: Abdomen is soft  Musculoskeletal: Normal range of motion  Comments: RLE AKA    Skin:     General: Skin is warm  Neurological:      General: No focal deficit present  Mental Status: He is alert and oriented to person, place, and time  Labs: I have personally reviewed pertinent lab results  Results from last 7 days   Lab Units 02/18/21 0644 02/17/21 2024 02/17/21  0935  02/16/21  0532  02/15/21  0558 02/13/21 2036   WBC Thousand/uL  --   --   --   --  7 21  --  9 76 15 91*   HEMOGLOBIN g/dL 9 4* 8 0* 8 7*   < > 7 7*   < > 8 0* 10 3*   HEMATOCRIT % 31 7* 26 6* 28 4*   < > 25 4*   < > 27 0* 34 7*   PLATELETS Thousands/uL  --   --   --   --  261  --  287 368   NEUTROS PCT %  --   --   --   --  76*  --   --   --    MONOS PCT %  --   --   --   --  9  --   --   --     < > = values in this interval not displayed        Results from last 7 days   Lab Units 02/18/21 0644 02/16/21  0532 02/15/21  0558   POTASSIUM mmol/L 3 2* 3 6 3 4*   CHLORIDE mmol/L 99* 101 104   CO2 mmol/L 35* 32 36*   BUN mg/dL 9 16 17   CREATININE mg/dL 0 35* 0 46* 0 49*   CALCIUM mg/dL 8 3 7 7* 7 8*   ALK PHOS U/L  --  80  --    ALT U/L  --  10*  --    AST U/L  --  13  --      Results from last 7 days   Lab Units 02/15/21  0558 02/13/21 2036   MAGNESIUM mg/dL 2 0 1 5*                  0   Lab Value Date/Time    TROPONINI <0 02 02/13/2021 2036    Jenny Ganga <0 03 01/11/2021 1020    TROPONINI <0 03 01/06/2021 0557    TROPONINI <0 03 12/31/2020 1955    TROPONINI <0 02 05/21/2020 1517    TROPONINI <0 02 05/21/2020 1228       Microbiology:  Sputum with mixed maria de jesus   Pleural fluid with candida albicans     Imaging and other studies: I have personally reviewed pertinent reports     and I have personally reviewed pertinent films in PACS        Willy Messer MD   Pulmonary & Critical Care Fellow  [facility]

## 2021-02-18 NOTE — ASSESSMENT & PLAN NOTE
Lab Results   Component Value Date    HGBA1C 11 6 (H) 12/09/2020       Recent Labs     02/17/21  2116 02/18/21  0656 02/18/21  1153 02/18/21  1258   POCGLU 77 84 60* 69       Blood Sugar Average: Last 72 hrs:  (P) 47 [id number]     · BBG on the lower side, patient does not eating much due to dysphagia  · Continue Lantus 10 units + SSI  · Discontinue 3 units Humalog t i d  With meals

## 2021-02-18 NOTE — RESTORATIVE TECHNICIAN NOTE
Restorative Specialist Mobility Note          Repositioned: Other (Comment)(Rep /sat pt upright in bed  Bed alarm on   Pt callbell, phone/tray within reach )          Rick GUALLPA, Restorative Technician, [employer]

## 2021-02-18 NOTE — PROGRESS NOTES
Progress Note - Infectious Disease   Travon Antonio 56 y o  male MRN: [id number]  Unit/Bed#: [facility] 717-01 Encounter: [id number]      Impression/Plan:  1  Left-sided candidal empyema-unclear primary process   No clear evidence of an esophageal perforation at least clinically and radiographically   Consideration for the possibility of a pneumonia with colonization of the airways with Candida and a spontaneous pneumothorax contaminating the pleural space   Consideration for the possibility of a transient fungemia which is now resolved   Fortunately the patient remains hemodynamically stable   He has undergone chest tube drainage and is receiving tPA installations   He has clinically improved  Tolerating oral antifungals without difficulty  EKG with prolonged QT in the setting of methadone and fluconazole  Patient had chest tube removed  -continue micafungin through 3/12/2022 to complete 4 weeks total treatment  -no additional fluconazole  -may eventually need decortication  -check barium swallow to make sure no leak  -recheck CBC with diff and CMP  -pulmonary follow-up     2   Fever-suspect all secondary to the fungal empyema   The blood cultures remain negative and the patient remains hemodynamically stable   He seems to be tolerating the antifungals without difficulty   No recurrence   -antifungals as above  -follow-up blood cultures  -no additional ID workup for now     3  COVID 19 pneumonia-status post the initiation of the severe treatment protocol   Patient has now completed that treatment  [facility] did not receive any antivirals such as remdesivir or plasma due to the late phase of illness in which treatment was initiated   Is much improved clinically  -no additional COVID-19 directed treatment  -monitor respiratory status     4  Acute hypoxic respiratory failure-likely multifactorial including COVID-19 pneumonia, and now hydropneumothorax in the setting of presumptive candidal empyema   The patient is still requiring oxygen by nasal cannula intermittently  -O2 support  -wean off oxygen as able  -no additional ID workup for now     5  Acute right lower extremity ischemia-with suspected rhabdomyolysis   The patient is now status 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  [facility] has been continuing on anticoagulation  No local wound cellulitis noted  -no additional directed antibiotics  -vascular follow-up  -local wound care     6  Diabetes mellitus-type 2 with long-term insulin use and hyperglycemia    Discussed the above management plan in detail with the primary service    Antibiotics:  Micafungin restart 2  Anti fungal 6    Subjective:  Patient has no fever, chills, sweats; no nausea, vomiting, diarrhea; no cough, shortness of breath; no pain  No new symptoms  He seems in good spirits  He is still on O2 by nasal cannula  The fluconazole was discontinued due to prolonged QT and the patient was restarted on micafungin    Objective:  Vitals:  Temp:  [97 4 °F (36 3 °C)-98 6 °F (37 °C)] 97 4 °F (36 3 °C)  HR:  [71-92] 79  Resp:  [18] 18  BP: (123-142)/(70-87) 135/74  SpO2:  [92 %-99 %] 95 %  Temp (24hrs), Av °F (36 7 °C), Min:97 4 °F (36 3 °C), Max:98 6 °F (37 °C)  Current: Temperature: (!) 97 4 °F (36 3 °C)    Physical Exam:   General Appearance:  Alert, interactive, nontoxic, no acute distress  Throat: Oropharynx moist without lesions  Lungs:   Decreased breath sounds bilaterally; no wheezes, rhonchi or rales; respirations unlabored   Heart:  RRR; no murmur, rub or gallop   Abdomen:   Soft, non-tender, non-distended, positive bowel sounds  Extremities: No clubbing, cyanosis or edema   Skin: No new rashes or lesions  No draining wounds noted         Labs, Imaging, & Other studies:   All pertinent labs and imaging studies were personally reviewed  Results from last 7 days   Lab Units 21  0644 21  0935  21  0532  02/15/21  0558 21   WBC Thousand/uL  --   --   --   --  7 21  --  9 76 15 91*   HEMOGLOBIN g/dL 9 4* 8 0* 8 7*   < > 7 7*   < > 8 0* 10 3*   PLATELETS Thousands/uL  --   --   --   --  261  --  287 368    < > = values in this interval not displayed  Results from last 7 days   Lab Units 02/18/21  0644 02/16/21  0532 02/15/21  0558   SODIUM mmol/L 138 137 141   POTASSIUM mmol/L 3 2* 3 6 3 4*   CHLORIDE mmol/L 99* 101 104   CO2 mmol/L 35* 32 36*   BUN mg/dL 9 16 17   CREATININE mg/dL 0 35* 0 46* 0 49*   EGFR ml/min/1 73sq m 134 120 117   CALCIUM mg/dL 8 3 7 7* 7 8*   AST U/L  --  13  --    ALT U/L  --  10*  --    ALK PHOS U/L  --  80  --      Results from last 7 days   Lab Units 02/14/21  0218 02/14/21  0217   BLOOD CULTURE  No Growth After 4 Days  No Growth After 4 Days       Results from last 7 days   Lab Units 02/13/21 2036   PROCALCITONIN ng/ml 0 30*        EKG- which is increased from previous    EKG personally read by me in epic

## 2021-02-18 NOTE — ASSESSMENT & PLAN NOTE
-the patient has prolonged QTC of 487, patient is on methadone and fluconazole  -Patient is on methadone for years  -Patient fluconazole was switched to micafungin per y ID

## 2021-02-18 NOTE — QUICK NOTE
59-year-old male with past medical history of type 2 diabetes on long-term use of insulin who is admitted for COVID pneumonia, and hospital stays complicated by left hydropneumothorax  Patient blood sugars were reviewed  Which is tightly controlled  Currently on Lantus 8 units q h s  decrease Lantus to 4 units q h s  And continue sliding scale  Continue to monitor blood sugar over time and make adjustments to the regimen if necessary

## 2021-02-18 NOTE — ASSESSMENT & PLAN NOTE
· Profound weakness, predominantly in right upper extremity, especially due to critical illness and steroid use  · During hospitalization, stroke alert was called because of concern for worsening right upper extremity weakness  CT head showed questionable hypodensity but no evidence of acute stroke or bleeding  · Patient currently on therapeutic Lovenox  · Strength currently 2 to 3/5 at baseline  · Evaluated by Neurology  No further inpatient workup required  · Family requested right arm x-ray which is ordered

## 2021-02-18 NOTE — PROCEDURES
Insert PICC line    Date/Time: 2/18/2021 1:45 PM  Performed by: Radha Griffith RN  Authorized by: Eugene Gómez MD     Patient location:  Bedside  Other Assisting Provider: Yes (comment) ([facility] Infusion Tech)    Consent:     Consent obtained:  Written (Physician obtained)    Consent given by:  Patient    Procedural risks discussed: with MD   [place] protocol:     Procedure explained and questions answered to patient or proxy's satisfaction: yes      Relevant documents present and verified: yes      Test results available and properly labeled: yes      Radiology Images displayed and confirmed  If images not available, report reviewed: yes      Required blood products, implants, devices, and special equipment available: yes      Site/side marked: yes      Immediately prior to procedure, a time out was called: yes      Patient identity confirmed:  Verbally with patient  Pre-procedure details:     Hand hygiene: Hand hygiene performed prior to insertion      Sterile barrier technique: All elements of maximal sterile technique followed      Skin preparation:  ChloraPrep    Skin preparation agent: Skin preparation agent completely dried prior to procedure    Indications:     PICC line indications: medications requiring central line    Anesthesia (see MAR for exact dosages):      Anesthesia method:  Local infiltration    Local anesthetic:  Lidocaine 2% w/o epi (2 ml)  Procedure details:     Location:  Brachial    Vessel type: vein      Laterality:  Left    Site selection rationale:  Rt arm weakness & edema    Approach: percutaneous technique used      Patient position:  Flat    Procedural supplies:  Double lumen    Catheter size:  5 Fr    Landmarks identified: yes      Ultrasound guidance: yes      Ultrasound image availability:  Not saved    Sterile ultrasound techniques: Sterile gel and sterile probe covers were used      Number of attempts:  1    Successful placement: yes      Vessel of catheter tip end: [place] 3CG confirmed    Total catheter length (cm):  40    Catheter out on skin (cm):  0    Max flow rate:  999    Arm circumference:  25  Post-procedure details:     Post-procedure:  Dressing applied and securement device placed    Assessment:  Blood return through all ports and free fluid flow    Post-procedure complications: none      Patient tolerance of procedure:   Tolerated well, no immediate complications

## 2021-02-18 NOTE — PROGRESS NOTES
Progress Note Rita Grain 1960, 56 y o  male MRN: [id number]    Unit/Bed#: [facility] 726-01 Encounter: [id number]    Primary Care Provider: VERONICA River   Date and time admitted to hospital: 1/13/2021  2:28 PM        Left Hydropneumothorax  Assessment & Plan  Candida empyema  Status post pigtail  On 2/17/21  Now on room air  Pulmonology and thoracic surgery following  Fluconazole changed to micafungin due to prolonged QTC along with methadone use  PICC line consent obtained and will need PICC line for IV micafungin for 3-4 weeks  Discussed with ID, appreciate input  Follow-up in the thoracic office 1 month after the discharge from the hospital with chest x-ray PA and lateral results    Fever  Assessment & Plan  Now resolved  As above    Ischemia of right lower extremity with suspected rhabdomyolysis  Assessment & Plan  · Suspected embolic from aortic thrombus  · 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  · Continue therapeutic Lovenox , pain management  Will need to discuss regarding oral anticoagulation on discharge  · Currently stable from a vascular surgery standpoint    Prolonged Q-T interval on ECG  Assessment & Plan  -the patient has prolonged QTC of 487, patient is on methadone and fluconazole  -Patient is on methadone for years  -Patient fluconazole was switched to micafungin per y ID    Acute encephalopathy  Assessment & Plan  Now resolved  Most likely toxic metabolic due to methadone and gabapentin  Continue to hold gabapentin  Methadone restarted at home dose as patient is on chronic methadone therapy and is requesting for his pain  Avoid any further sedating medications  Right femoral vein DVT (HCC)  Assessment & Plan  Continue therapeutic Lovenox       Edema of right upper extremity  Assessment & Plan  · +2 pitting edema also with significant weakness/paresis   · S/p Venous duplex on 01/26 negative for acute or chronic DVT   · Resumed back on lasix with some improvement    Oropharyngeal dysphagia  Assessment & Plan  · Extubated 1/25 and required NGT for nutrition   · Speech therapy following   · S/p barium swallow - official report pending  · Continue modified diet  · Continue robinul  · S/p ENT consult, no vocal cord motion impairment  · Status post video barium swallow on 02/16  Muscle weakness of right upper extremity  Assessment & Plan  · Profound weakness, predominantly in right upper extremity, especially due to critical illness and steroid use  · During hospitalization, stroke alert was called because of concern for worsening right upper extremity weakness  CT head showed questionable hypodensity but no evidence of acute stroke or bleeding  · Patient currently on therapeutic Lovenox  · Strength currently 2 to 3/5 at baseline  · Evaluated by Neurology  No further inpatient workup required  · Family requested right arm x-ray which is ordered  Anemia  Assessment & Plan  · Secondary to critical illness, no sign of acute blood loss  · Neg stool occult  · Hemoglobin stable around his baseline  · Continue to monitor and transfuse if < 7    Aortic thrombus (HCC)  Assessment & Plan  · With embolic event that compromised right femoral artery  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  S/p R AKA formalization 1/18   · Continue therapeutic lovenox  · F/u with vascular surgery as outpatient    Acute respiratory failure with hypoxia ([address] )  Assessment & Plan  · Secondary to COVID 19  · Extubated 1/25  · Now resolved and on room air      Type 2 diabetes mellitus, with long-term current use of insulin [facility])  Assessment & Plan  Lab Results   Component Value Date    HGBA1C 11 6 (H) 12/09/2020       Recent Labs     02/17/21  2116 02/18/21  0656 02/18/21  1153 02/18/21  1258   POCGLU 77 84 60* 69       Blood Sugar Average: Last 72 hrs:  (P) 58 [id number]     · BBG on the lower side, patient does not eating much due to dysphagia  · Continue Lantus 10 units + SSI  · Discontinue 3 units Humalog t i d  With meals  BMI 28 0-28 9,adult  Assessment & Plan  Patient presenting with malnutrition  S/p nutrition assessment        Methadone maintenance therapy patient [facility])  Assessment & Plan  Chronic methadone use  Methadone was held yesterday due to lethargy yesterday  Restarted methadone today as patient requested due to significant limb pain  And he is on chronic methadone therapy  Will continue to hold gabapentin for now to avoid over sedation and lethargy  Fluconazole discontinued due to prolonged QTC with concomitant methadone fluconazole use  HTN (hypertension)  Assessment & Plan  · Stable  · Continue lisinopril, lasix    * Pneumonia due to COVID-19 virus  Assessment & Plan  · Tested positive for covid 2020  · S/p completion of covid tx  · S/p completion of IV abx for possible superimposed bacterial pna  · Considered recovered; off isolation          VTE Pharmacologic Prophylaxis:   Pharmacologic: Enoxaparin (Lovenox)  Mechanical VTE Prophylaxis in Place: Yes    Patient Centered Rounds: I have performed bedside rounds with nursing staff today  Discussions with Specialists or Other Care Team Provider:  ID,     Education and Discussions with Family / Patient:  Discussed plan of care with patient  Also called and updated nephew,    Time Spent for Care: 30 minutes  More than 50% of total time spent on counseling and coordination of care as described above  Current Length of Stay: 36 day(s)    Current Patient Status: Inpatient   Certification Statement: The patient will continue to require additional inpatient hospital stay due to Pending placement    Discharge Plan:  Pending placement    Code Status: Level 1 - Full Code      Subjective:   No overnight events reported  Patient is comfortable without any chest pain or dyspnea  Med for ED barium swallow today which is negative for any leak      Objective:     Vitals:   Temp (24hrs), Av 2 °F (36 8 °C), Min:97 4 °F (36 3 °C), Max:98 6 °F (37 °C)    Temp:  [97 4 °F (36 3 °C)-98 6 °F (37 °C)] 97 4 °F (36 3 °C)  HR:  [71-82] 79  Resp:  [18] 18  BP: (123-142)/(70-75) 135/74  SpO2:  [92 %-99 %] 95 %  Body mass index is 23 68 kg/m²  Input and Output Summary (last 24 hours): Intake/Output Summary (Last 24 hours) at 2/18/2021 1423  Last data filed at 2/18/2021 0800  Gross per 24 hour   Intake 118 ml   Output 350 ml   Net -232 ml       Physical Exam:     Physical Exam    Constitutional: Jettie Pretzel is not in acute distress  Cardiovascular: Rate and Rhythm: Normal rate and regular rhythm  Heart sounds: Normal heart sounds  No murmur  Pulmonary: Effort: No respiratory distress  Breath sounds: No wheezing or rales  Coarse breath sounds  Chest tube noted  Abdominal: Bowel sounds are normal  There is no distension  soft  here is no abdominal tenderness  Musculoskeletal:   Right AKA noted   Neurological:      Mental Status: He is alert       Comments: Awake alert and communicative     Additional Data:     Labs:    Results from last 7 days   Lab Units 02/18/21  0644  02/16/21  0532   WBC Thousand/uL  --   --  7 21   HEMOGLOBIN g/dL 9 4*   < > 7 7*   HEMATOCRIT % 31 7*   < > 25 4*   PLATELETS Thousands/uL  --   --  261   NEUTROS PCT %  --   --  76*   LYMPHS PCT %  --   --  11*   MONOS PCT %  --   --  9   EOS PCT %  --   --  2    < > = values in this interval not displayed       Results from last 7 days   Lab Units 02/18/21  0644 02/16/21  0532   SODIUM mmol/L 138 137   POTASSIUM mmol/L 3 2* 3 6   CHLORIDE mmol/L 99* 101   CO2 mmol/L 35* 32   BUN mg/dL 9 16   CREATININE mg/dL 0 35* 0 46*   ANION GAP mmol/L 4 4   CALCIUM mg/dL 8 3 7 7*   ALBUMIN g/dL  --  1 2*   TOTAL BILIRUBIN mg/dL  --  0 73   ALK PHOS U/L  --  80   ALT U/L  --  10*   AST U/L  --  13   GLUCOSE RANDOM mg/dL 66 121         Results from last 7 days   Lab Units 02/18/21  1258 02/18/21  1153 02/18/21  0656 02/17/21  2116 02/17/21  1629 02/17/21  1057 02/17/21  1018 02/17/21  0912 02/17/21  0814 02/17/21  0733 02/16/21  2121 02/16/21  1611   POC GLUCOSE mg/dl 69 60* 84 77 85 78 91 95 71 67 93 92         Results from last 7 days   Lab Units 02/13/21 2036   PROCALCITONIN ng/ml 0 30*           * I Have Reviewed All Lab Data Listed Above  * Additional Pertinent Lab Tests Reviewed: All Labs Within Last 24 Hours Reviewed    Imaging:    Xr Chest Pa & Lateral    Result Date: 2/18/2021  Impression Left hydropneumothorax, unchanged after removal of pigtail left chest drainage catheter  Workstation performed: [id number]     Xr Chest 2 Views    Result Date: 5/21/2020  Impression No acute cardiopulmonary disease  Workstation performed: [id number]     Xr Chest Pa & Lateral    Result Date: 2/27/2020  Impression No acute cardiopulmonary disease  Workstation performed: [id number]     Recent Cultures (last 7 days):     Results from last 7 days   Lab Units 02/14/21  0218 02/14/21  0217   BLOOD CULTURE  No Growth After 4 Days  No Growth After 4 Days         Last 24 Hours Medication List:   Current Facility-Administered Medications   Medication Dose Route Frequency Provider Last Rate    acetaminophen  650 mg Rectal Q6H PRN Garfield Mason MD      acetaminophen  650 mg Oral Q6H PRN VERONICA Kennedy      albumin human  25 g Intravenous Once Garfield Mason MD Stopped (02/15/21 1715)    albuterol  2 puff Inhalation Q4H PRN Caroline Lucia,       bisacodyl  10 mg Rectal Daily PRN VERONICA Patton      cholecalciferol  2,000 Units Oral Daily VERONICA Kennedy      Diclofenac Sodium  4 g Topical 4x Daily VERONICA Kennedy      enoxaparin  1 mg/kg Subcutaneous Q12H [address] Garfield Mason MD      furosemide  40 mg Oral Daily Caroline Lucia DO      glycopyrrolate  1 mg Oral TID VERONICA Kennedy      insulin glargine  4 Units Subcutaneous HS Everton Lazo MD      insulin lispro  1-5 Units Subcutaneous TID AC Brandon Fowler MD      Lidocaine Viscous HCl  15 mL Swish & Spit 4x Daily PRN Shannan A Sedora, VERONICA      lisinopril  10 mg Oral Daily Shannan A Rayoora, VERONICA      menthol-methyl salicylate   Apply externally 4x Daily PRN VERONICA Castle      methadone  70 mg Oral Daily Zackary Evangelista MD      micafungin  100 mg Intravenous Q24H Zackary Evangelista  mg (02/17/21 4637)   Shu Slipper SAMREEN ANTIFUNGAL  1 application Topical TID Sonia Mccall DO      multivitamin-minerals  1 tablet Oral Daily Shannan A Sedora, VERONICA      omeprazole (PRILOSEC) suspension 2 mg/mL  20 mg Oral Early Morning Shannan A Sedora, VERONICA      ondansetron  4 mg Intravenous Q6H PRN Toby Beyer PA-C      phenol  1 spray Mouth/Throat Q2H PRN Shannan A Sedora, VERONICA      polyethylene glycol  17 g Oral Daily Shannan A Sedora, VERONICA      senna  1 tablet Oral BID Shannan A Sedora, VERONICA      sodium chloride  1 spray Each Nare Q1H PRN Virgen Pacheco DO      sodium chloride  1,000 mL Intravenous Once Matthias Connor MD Stopped (02/15/21 1486)        Today, Patient Was Seen By: Zackary Evangelista MD    ** Please Note: Dictation voice to text software may have been used in the creation of this document   **

## 2021-02-18 NOTE — PLAN OF CARE
Problem: Prexisting or High Potential for Compromised Skin Integrity  Goal: Skin integrity is maintained or improved  Description: INTERVENTIONS:  - Identify patients at risk for skin breakdown  - Assess and monitor skin integrity  - Assess and monitor nutrition and hydration status  - Monitor labs   - Assess for incontinence   - Turn and reposition patient  - Assist with mobility/ambulation  - Relieve pressure over bony prominences  - Avoid friction and shearing  - Provide appropriate hygiene as needed including keeping skin clean and dry  - Evaluate need for skin moisturizer/barrier cream  - Collaborate with interdisciplinary team   - Patient/family teaching  - Consider wound care consult   Outcome: Progressing     Problem: Potential for Falls  Goal: Patient will remain free of falls  Description: INTERVENTIONS:  - Assess patient frequently for physical needs  -  Identify cognitive and physical deficits and behaviors that affect risk of falls  -  [place] fall precautions as indicated by assessment   - Educate patient/family on patient safety including physical limitations  - Instruct patient to call for assistance with activity based on assessment  - Modify environment to reduce risk of injury  - Consider OT/PT consult to assist with strengthening/mobility  Outcome: Progressing     Problem: Nutrition/Hydration-ADULT  Goal: Nutrient/Hydration intake appropriate for improving, restoring or maintaining nutritional needs  Description: Monitor and assess patient's nutrition/hydration status for malnutrition  Collaborate with interdisciplinary team and initiate plan and interventions as ordered  Monitor patient's weight and dietary intake as ordered or per policy  Utilize nutrition screening tool and intervene as necessary  Determine patient's food preferences and provide high-protein, high-caloric foods as appropriate       INTERVENTIONS:  - Monitor oral intake, urinary output, labs, and treatment plans  - Assess nutrition and hydration status and recommend course of action  - Evaluate amount of meals eaten  - Assist patient with eating if necessary   - Allow adequate time for meals  - Recommend/ encourage appropriate diets, oral nutritional supplements, and vitamin/mineral supplements  - Order, calculate, and assess calorie counts as needed  - Assess need for intravenous fluids  - Provide specific nutrition/hydration education as appropriate  - Include patient/family/caregiver in decisions related to nutrition  Outcome: Progressing     Problem: PAIN - ADULT  Goal: Verbalizes/displays adequate comfort level or baseline comfort level  Description: Interventions:  - Encourage patient to monitor pain and request assistance  - Assess pain using appropriate pain scale  - Administer analgesics based on type and severity of pain and evaluate response  - Implement non-pharmacological measures as appropriate and evaluate response  - Consider cultural and social influences on pain and pain management  - Notify physician/advanced practitioner if interventions unsuccessful or patient reports new pain  Outcome: Progressing     Problem: INFECTION - ADULT  Goal: Absence or prevention of progression during hospitalization  Description: INTERVENTIONS:  - Assess and monitor for signs and symptoms of infection  - Monitor lab/diagnostic results  - Monitor all insertion sites, i e  indwelling lines, tubes, and drains  - [place] appropriate cooling/warming therapies per order  - Administer medications as ordered  - Instruct and encourage patient and family to use good hand hygiene technique  - Identify and instruct in appropriate isolation precautions for identified infection/condition  Outcome: Progressing     Problem: SAFETY ADULT  Goal: Patient will remain free of falls  Description: INTERVENTIONS:  - Assess patient frequently for physical needs  -  Identify cognitive and physical deficits and behaviors that affect risk of falls    - [place] fall precautions as indicated by assessment   - Educate patient/family on patient safety including physical limitations  - Instruct patient to call for assistance with activity based on assessment  - Modify environment to reduce risk of injury  - Consider OT/PT consult to assist with strengthening/mobility  Outcome: Progressing  Goal: Maintain or return to baseline ADL function  Description: INTERVENTIONS:  -  Assess patient's ability to carry out ADLs; assess patient's baseline for ADL function and identify physical deficits which impact ability to perform ADLs (bathing, care of mouth/teeth, toileting, grooming, dressing, etc )  - Assess/evaluate cause of self-care deficits   - Assess range of motion  - Assess patient's mobility; develop plan if impaired  - Assess patient's need for assistive devices and provide as appropriate  - Encourage maximum independence but intervene and supervise when necessary  - Involve family in performance of ADLs  - Assess for home care needs following discharge   - Consider OT consult to assist with ADL evaluation and planning for discharge  - Provide patient education as appropriate  Outcome: Progressing  Goal: Maintain or return mobility status to optimal level  Description: INTERVENTIONS:  - Assess patient's baseline mobility status (ambulation, transfers, stairs, etc )    - Identify cognitive and physical deficits and behaviors that affect mobility  - Identify mobility aids required to assist with transfers and/or ambulation (gait belt, sit-to-stand, lift, walker, cane, etc )  - [place] fall precautions as indicated by assessment  - Record patient progress and toleration of activity level on Mobility SBAR; progress patient to next Phase/Stage  - Instruct patient to call for assistance with activity based on assessment  - Consider rehabilitation consult to assist with strengthening/weightbearing, etc   Outcome: Progressing     Problem: DISCHARGE PLANNING  Goal: Discharge to home or other facility with appropriate resources  Description: INTERVENTIONS:  - Identify barriers to discharge w/patient and caregiver  - Arrange for needed discharge resources and transportation as appropriate  - Identify discharge learning needs (meds, wound care, etc )  - Arrange for interpretive services to assist at discharge as needed  - Refer to Case Management Department for coordinating discharge planning if the patient needs post-hospital services based on physician/advanced practitioner order or complex needs related to functional status, cognitive ability, or social support system  Outcome: Progressing     Problem: Knowledge Deficit  Goal: Patient/family/caregiver demonstrates understanding of disease process, treatment plan, medications, and discharge instructions  Description: Complete learning assessment and assess knowledge base    Interventions:  - Provide teaching at level of understanding  - Provide teaching via preferred learning methods  Outcome: Progressing

## 2021-02-19 LAB
ANION GAP SERPL CALCULATED.3IONS-SCNC: 5 MMOL/L (ref 4–13)
BACTERIA BLD CULT: NORMAL
BACTERIA BLD CULT: NORMAL
BUN SERPL-MCNC: 7 MG/DL (ref 5–25)
CALCIUM SERPL-MCNC: 7.4 MG/DL (ref 8.3–10.1)
CHLORIDE SERPL-SCNC: 105 MMOL/L (ref 100–108)
CO2 SERPL-SCNC: 33 MMOL/L (ref 21–32)
CREAT SERPL-MCNC: 0.43 MG/DL (ref 0.6–1.3)
ERYTHROCYTE [DISTWIDTH] IN BLOOD BY AUTOMATED COUNT: 16.2 % (ref 11.6–15.1)
GFR SERPL CREATININE-BSD FRML MDRD: 124 ML/MIN/1.73SQ M
GLUCOSE SERPL-MCNC: 107 MG/DL (ref 65–140)
GLUCOSE SERPL-MCNC: 115 MG/DL (ref 65–140)
GLUCOSE SERPL-MCNC: 124 MG/DL (ref 65–140)
GLUCOSE SERPL-MCNC: 127 MG/DL (ref 65–140)
GLUCOSE SERPL-MCNC: 86 MG/DL (ref 65–140)
HCT VFR BLD AUTO: 27.5 % (ref 36.5–49.3)
HCT VFR BLD AUTO: 27.9 % (ref 36.5–49.3)
HCT VFR BLD AUTO: 29.1 % (ref 36.5–49.3)
HGB BLD-MCNC: 8.2 G/DL (ref 12–17)
HGB BLD-MCNC: 8.3 G/DL (ref 12–17)
HGB BLD-MCNC: 8.7 G/DL (ref 12–17)
MCH RBC QN AUTO: 27.4 PG (ref 26.8–34.3)
MCHC RBC AUTO-ENTMCNC: 29.9 G/DL (ref 31.4–37.4)
MCV RBC AUTO: 92 FL (ref 82–98)
PLATELET # BLD AUTO: 387 THOUSANDS/UL (ref 149–390)
PMV BLD AUTO: 10.2 FL (ref 8.9–12.7)
POTASSIUM SERPL-SCNC: 3.2 MMOL/L (ref 3.5–5.3)
RBC # BLD AUTO: 3.18 MILLION/UL (ref 3.88–5.62)
SODIUM SERPL-SCNC: 143 MMOL/L (ref 136–145)
WBC # BLD AUTO: 8.87 THOUSAND/UL (ref 4.31–10.16)

## 2021-02-19 PROCEDURE — 97110 THERAPEUTIC EXERCISES: CPT

## 2021-02-19 PROCEDURE — 94668 MNPJ CHEST WALL SBSQ: CPT

## 2021-02-19 PROCEDURE — 82948 REAGENT STRIP/BLOOD GLUCOSE: CPT

## 2021-02-19 PROCEDURE — 85027 COMPLETE CBC AUTOMATED: CPT | Performed by: INTERNAL MEDICINE

## 2021-02-19 PROCEDURE — 85014 HEMATOCRIT: CPT | Performed by: INTERNAL MEDICINE

## 2021-02-19 PROCEDURE — 85018 HEMOGLOBIN: CPT | Performed by: INTERNAL MEDICINE

## 2021-02-19 PROCEDURE — 99232 SBSQ HOSP IP/OBS MODERATE 35: CPT | Performed by: INTERNAL MEDICINE

## 2021-02-19 PROCEDURE — 97530 THERAPEUTIC ACTIVITIES: CPT

## 2021-02-19 PROCEDURE — 97535 SELF CARE MNGMENT TRAINING: CPT

## 2021-02-19 PROCEDURE — 80048 BASIC METABOLIC PNL TOTAL CA: CPT | Performed by: INTERNAL MEDICINE

## 2021-02-19 PROCEDURE — 92526 ORAL FUNCTION THERAPY: CPT

## 2021-02-19 PROCEDURE — 94760 N-INVAS EAR/PLS OXIMETRY 1: CPT

## 2021-02-19 RX ORDER — POTASSIUM CHLORIDE 20 MEQ/1
40 TABLET, EXTENDED RELEASE ORAL ONCE
Status: COMPLETED | OUTPATIENT
Start: 2021-02-19 | End: 2021-02-19

## 2021-02-19 RX ADMIN — POTASSIUM CHLORIDE 40 MEQ: 1500 TABLET, EXTENDED RELEASE ORAL at 09:34

## 2021-02-19 RX ADMIN — GLYCOPYRROLATE 1 MG: 1 TABLET ORAL at 09:26

## 2021-02-19 RX ADMIN — MICONAZOLE NITRATE 1 APPLICATION: 20 CREAM TOPICAL at 17:10

## 2021-02-19 RX ADMIN — FUROSEMIDE 40 MG: 40 TABLET ORAL at 09:26

## 2021-02-19 RX ADMIN — MICAFUNGIN SODIUM 100 MG: 50 INJECTION, POWDER, LYOPHILIZED, FOR SOLUTION INTRAVENOUS at 17:13

## 2021-02-19 RX ADMIN — MICONAZOLE NITRATE 1 APPLICATION: 20 CREAM TOPICAL at 09:27

## 2021-02-19 RX ADMIN — ACETAMINOPHEN 650 MG: 325 TABLET, FILM COATED ORAL at 00:03

## 2021-02-19 RX ADMIN — Medication 1 TABLET: at 09:26

## 2021-02-19 RX ADMIN — INSULIN GLARGINE 4 UNITS: 100 INJECTION, SOLUTION SUBCUTANEOUS at 21:32

## 2021-02-19 RX ADMIN — Medication 2000 UNITS: at 09:26

## 2021-02-19 RX ADMIN — GLYCOPYRROLATE 1 MG: 1 TABLET ORAL at 17:11

## 2021-02-19 RX ADMIN — LISINOPRIL 10 MG: 10 TABLET ORAL at 09:26

## 2021-02-19 RX ADMIN — Medication 20 MG: at 05:51

## 2021-02-19 RX ADMIN — ENOXAPARIN SODIUM 70 MG: 80 INJECTION SUBCUTANEOUS at 21:32

## 2021-02-19 RX ADMIN — GLYCOPYRROLATE 1 MG: 1 TABLET ORAL at 21:33

## 2021-02-19 RX ADMIN — MICONAZOLE NITRATE 1 APPLICATION: 20 CREAM TOPICAL at 21:35

## 2021-02-19 RX ADMIN — METHADONE HYDROCHLORIDE 70 MG: 10 TABLET ORAL at 09:26

## 2021-02-19 RX ADMIN — ENOXAPARIN SODIUM 70 MG: 80 INJECTION SUBCUTANEOUS at 09:27

## 2021-02-19 NOTE — RESTORATIVE TECHNICIAN NOTE
Restorative Specialist Mobility Note          Repositioned: Other (Comment)(Rep /sat pt upright in bed  Bed alarm on   Pt callbell, phone/tray within reach )     Lucio GUALLPA, Restorative Technician, [employer]

## 2021-02-19 NOTE — PLAN OF CARE
Problem: PHYSICAL THERAPY ADULT  Goal: Performs mobility at highest level of function for planned discharge setting  See evaluation for individualized goals  Description: Treatment/Interventions: Functional transfer training, LE strengthening/ROM, Therapeutic exercise, Endurance training, Bed mobility          See flowsheet documentation for full assessment, interventions and recommendations  Outcome: Progressing  Note: Prognosis: Fair  Problem List: Decreased strength, Decreased endurance, Impaired balance, Decreased mobility, Decreased skin integrity  Assessment: Pt seen for physical therapy session with a focus on bed mobility, functional transfers, there-ex, and activity tolerance  Today pt showed improved in bed mobility, able to complete supine to sit transfer with mod A x 1 and required no physical assistance to move from sit to supine  Did take pt a few trials to be able to lift LLE up and on to bed  Today, pt tolerated sitting EOB for approx ~10 minutes with encouragement  Tolerated there-ex well today  Continue to recommend STR upon discharge  Pt to benefit from continued inpatient PT services  Barriers to Discharge: Inaccessible home environment, Decreased caregiver support     PT Discharge Recommendation: [id number]     PT - OK to Discharge: Yes    See flowsheet documentation for full assessment

## 2021-02-19 NOTE — PLAN OF CARE
Problem: Prexisting or High Potential for Compromised Skin Integrity  Goal: Skin integrity is maintained or improved  Description: INTERVENTIONS:  - Identify patients at risk for skin breakdown  - Assess and monitor skin integrity  - Assess and monitor nutrition and hydration status  - Monitor labs   - Assess for incontinence   - Turn and reposition patient  - Assist with mobility/ambulation  - Relieve pressure over bony prominences  - Avoid friction and shearing  - Provide appropriate hygiene as needed including keeping skin clean and dry  - Evaluate need for skin moisturizer/barrier cream  - Collaborate with interdisciplinary team   - Patient/family teaching  - Consider wound care consult   Outcome: Progressing     Problem: Potential for Falls  Goal: Patient will remain free of falls  Description: INTERVENTIONS:  - Assess patient frequently for physical needs  -  Identify cognitive and physical deficits and behaviors that affect risk of falls  -  [place] fall precautions as indicated by assessment   - Educate patient/family on patient safety including physical limitations  - Instruct patient to call for assistance with activity based on assessment  - Modify environment to reduce risk of injury  - Consider OT/PT consult to assist with strengthening/mobility  Outcome: Progressing     Problem: Nutrition/Hydration-ADULT  Goal: Nutrient/Hydration intake appropriate for improving, restoring or maintaining nutritional needs  Description: Monitor and assess patient's nutrition/hydration status for malnutrition  Collaborate with interdisciplinary team and initiate plan and interventions as ordered  Monitor patient's weight and dietary intake as ordered or per policy  Utilize nutrition screening tool and intervene as necessary  Determine patient's food preferences and provide high-protein, high-caloric foods as appropriate       INTERVENTIONS:  - Monitor oral intake, urinary output, labs, and treatment plans  - Assess nutrition and hydration status and recommend course of action  - Evaluate amount of meals eaten  - Assist patient with eating if necessary   - Allow adequate time for meals  - Recommend/ encourage appropriate diets, oral nutritional supplements, and vitamin/mineral supplements  - Order, calculate, and assess calorie counts as needed  - Assess need for intravenous fluids  - Provide specific nutrition/hydration education as appropriate  - Include patient/family/caregiver in decisions related to nutrition  Outcome: Progressing     Problem: PAIN - ADULT  Goal: Verbalizes/displays adequate comfort level or baseline comfort level  Description: Interventions:  - Encourage patient to monitor pain and request assistance  - Assess pain using appropriate pain scale  - Administer analgesics based on type and severity of pain and evaluate response  - Implement non-pharmacological measures as appropriate and evaluate response  - Consider cultural and social influences on pain and pain management  - Notify physician/advanced practitioner if interventions unsuccessful or patient reports new pain  Outcome: Progressing     Problem: INFECTION - ADULT  Goal: Absence or prevention of progression during hospitalization  Description: INTERVENTIONS:  - Assess and monitor for signs and symptoms of infection  - Monitor lab/diagnostic results  - Monitor all insertion sites, i e  indwelling lines, tubes, and drains  - [place] appropriate cooling/warming therapies per order  - Administer medications as ordered  - Instruct and encourage patient and family to use good hand hygiene technique  - Identify and instruct in appropriate isolation precautions for identified infection/condition  Outcome: Progressing     Problem: SAFETY ADULT  Goal: Patient will remain free of falls  Description: INTERVENTIONS:  - Assess patient frequently for physical needs  -  Identify cognitive and physical deficits and behaviors that affect risk of falls    - [place] fall precautions as indicated by assessment   - Educate patient/family on patient safety including physical limitations  - Instruct patient to call for assistance with activity based on assessment  - Modify environment to reduce risk of injury  - Consider OT/PT consult to assist with strengthening/mobility  Outcome: Progressing  Goal: Maintain or return to baseline ADL function  Description: INTERVENTIONS:  -  Assess patient's ability to carry out ADLs; assess patient's baseline for ADL function and identify physical deficits which impact ability to perform ADLs (bathing, care of mouth/teeth, toileting, grooming, dressing, etc )  - Assess/evaluate cause of self-care deficits   - Assess range of motion  - Assess patient's mobility; develop plan if impaired  - Assess patient's need for assistive devices and provide as appropriate  - Encourage maximum independence but intervene and supervise when necessary  - Involve family in performance of ADLs  - Assess for home care needs following discharge   - Consider OT consult to assist with ADL evaluation and planning for discharge  - Provide patient education as appropriate  Outcome: Progressing  Goal: Maintain or return mobility status to optimal level  Description: INTERVENTIONS:  - Assess patient's baseline mobility status (ambulation, transfers, stairs, etc )    - Identify cognitive and physical deficits and behaviors that affect mobility  - Identify mobility aids required to assist with transfers and/or ambulation (gait belt, sit-to-stand, lift, walker, cane, etc )  - [place] fall precautions as indicated by assessment  - Record patient progress and toleration of activity level on Mobility SBAR; progress patient to next Phase/Stage  - Instruct patient to call for assistance with activity based on assessment  - Consider rehabilitation consult to assist with strengthening/weightbearing, etc   Outcome: Progressing     Problem: DISCHARGE PLANNING  Goal: Discharge to home or other facility with appropriate resources  Description: INTERVENTIONS:  - Identify barriers to discharge w/patient and caregiver  - Arrange for needed discharge resources and transportation as appropriate  - Identify discharge learning needs (meds, wound care, etc )  - Arrange for interpretive services to assist at discharge as needed  - Refer to Case Management Department for coordinating discharge planning if the patient needs post-hospital services based on physician/advanced practitioner order or complex needs related to functional status, cognitive ability, or social support system  Outcome: Progressing     Problem: Knowledge Deficit  Goal: Patient/family/caregiver demonstrates understanding of disease process, treatment plan, medications, and discharge instructions  Description: Complete learning assessment and assess knowledge base    Interventions:  - Provide teaching at level of understanding  - Provide teaching via preferred learning methods  Outcome: Progressing

## 2021-02-19 NOTE — CASE MANAGEMENT
TC to patient's pharmacy: [facility] in [facility] pass to obtain pricing for eliquis  S/W Enrique Valle  Pt has a $0 copay for same

## 2021-02-19 NOTE — RESTORATIVE TECHNICIAN NOTE
Restorative Specialist Mobility Note       Activity: [place](Educated/encouraged pt to sit EOB with assistance  Bed alarm on   Pt callbell, phone/tray within reach )     Assistive Device: Other (Comment)(Assist x1 to sit pt EOB/do ROM exercises)        Range of Motion: Left leg, Active        Florencio GUALLPA, Restorative Technician, [employer]

## 2021-02-19 NOTE — PLAN OF CARE
Problem: Prexisting or High Potential for Compromised Skin Integrity  Goal: Skin integrity is maintained or improved  Description: INTERVENTIONS:  - Identify patients at risk for skin breakdown  - Assess and monitor skin integrity  - Assess and monitor nutrition and hydration status  - Monitor labs   - Assess for incontinence   - Turn and reposition patient  - Assist with mobility/ambulation  - Relieve pressure over bony prominences  - Avoid friction and shearing  - Provide appropriate hygiene as needed including keeping skin clean and dry  - Evaluate need for skin moisturizer/barrier cream  - Collaborate with interdisciplinary team   - Patient/family teaching  - Consider wound care consult   Outcome: Progressing     Problem: Potential for Falls  Goal: Patient will remain free of falls  Description: INTERVENTIONS:  - Assess patient frequently for physical needs  -  Identify cognitive and physical deficits and behaviors that affect risk of falls  -  [place] fall precautions as indicated by assessment   - Educate patient/family on patient safety including physical limitations  - Instruct patient to call for assistance with activity based on assessment  - Modify environment to reduce risk of injury  - Consider OT/PT consult to assist with strengthening/mobility  Outcome: Progressing     Problem: Nutrition/Hydration-ADULT  Goal: Nutrient/Hydration intake appropriate for improving, restoring or maintaining nutritional needs  Description: Monitor and assess patient's nutrition/hydration status for malnutrition  Collaborate with interdisciplinary team and initiate plan and interventions as ordered  Monitor patient's weight and dietary intake as ordered or per policy  Utilize nutrition screening tool and intervene as necessary  Determine patient's food preferences and provide high-protein, high-caloric foods as appropriate       INTERVENTIONS:  - Monitor oral intake, urinary output, labs, and treatment plans  - Assess nutrition and hydration status and recommend course of action  - Evaluate amount of meals eaten  - Assist patient with eating if necessary   - Allow adequate time for meals  - Recommend/ encourage appropriate diets, oral nutritional supplements, and vitamin/mineral supplements  - Order, calculate, and assess calorie counts as needed  - Assess need for intravenous fluids  - Provide specific nutrition/hydration education as appropriate  - Include patient/family/caregiver in decisions related to nutrition  Outcome: Progressing     Problem: PAIN - ADULT  Goal: Verbalizes/displays adequate comfort level or baseline comfort level  Description: Interventions:  - Encourage patient to monitor pain and request assistance  - Assess pain using appropriate pain scale  - Administer analgesics based on type and severity of pain and evaluate response  - Implement non-pharmacological measures as appropriate and evaluate response  - Consider cultural and social influences on pain and pain management  - Notify physician/advanced practitioner if interventions unsuccessful or patient reports new pain  Outcome: Progressing     Problem: INFECTION - ADULT  Goal: Absence or prevention of progression during hospitalization  Description: INTERVENTIONS:  - Assess and monitor for signs and symptoms of infection  - Monitor lab/diagnostic results  - Monitor all insertion sites, i e  indwelling lines, tubes, and drains  - [place] appropriate cooling/warming therapies per order  - Administer medications as ordered  - Instruct and encourage patient and family to use good hand hygiene technique  - Identify and instruct in appropriate isolation precautions for identified infection/condition  Outcome: Progressing     Problem: SAFETY ADULT  Goal: Patient will remain free of falls  Description: INTERVENTIONS:  - Assess patient frequently for physical needs  -  Identify cognitive and physical deficits and behaviors that affect risk of falls    - [place] fall precautions as indicated by assessment   - Educate patient/family on patient safety including physical limitations  - Instruct patient to call for assistance with activity based on assessment  - Modify environment to reduce risk of injury  - Consider OT/PT consult to assist with strengthening/mobility  Outcome: Progressing  Goal: Maintain or return to baseline ADL function  Description: INTERVENTIONS:  -  Assess patient's ability to carry out ADLs; assess patient's baseline for ADL function and identify physical deficits which impact ability to perform ADLs (bathing, care of mouth/teeth, toileting, grooming, dressing, etc )  - Assess/evaluate cause of self-care deficits   - Assess range of motion  - Assess patient's mobility; develop plan if impaired  - Assess patient's need for assistive devices and provide as appropriate  - Encourage maximum independence but intervene and supervise when necessary  - Involve family in performance of ADLs  - Assess for home care needs following discharge   - Consider OT consult to assist with ADL evaluation and planning for discharge  - Provide patient education as appropriate  Outcome: Progressing  Goal: Maintain or return mobility status to optimal level  Description: INTERVENTIONS:  - Assess patient's baseline mobility status (ambulation, transfers, stairs, etc )    - Identify cognitive and physical deficits and behaviors that affect mobility  - Identify mobility aids required to assist with transfers and/or ambulation (gait belt, sit-to-stand, lift, walker, cane, etc )  - [place] fall precautions as indicated by assessment  - Record patient progress and toleration of activity level on Mobility SBAR; progress patient to next Phase/Stage  - Instruct patient to call for assistance with activity based on assessment  - Consider rehabilitation consult to assist with strengthening/weightbearing, etc   Outcome: Progressing     Problem: DISCHARGE PLANNING  Goal: Discharge to home or other facility with appropriate resources  Description: INTERVENTIONS:  - Identify barriers to discharge w/patient and caregiver  - Arrange for needed discharge resources and transportation as appropriate  - Identify discharge learning needs (meds, wound care, etc )  - Arrange for interpretive services to assist at discharge as needed  - Refer to Case Management Department for coordinating discharge planning if the patient needs post-hospital services based on physician/advanced practitioner order or complex needs related to functional status, cognitive ability, or social support system  Outcome: Progressing     Problem: Knowledge Deficit  Goal: Patient/family/caregiver demonstrates understanding of disease process, treatment plan, medications, and discharge instructions  Description: Complete learning assessment and assess knowledge base    Interventions:  - Provide teaching at level of understanding  - Provide teaching via preferred learning methods  Outcome: Progressing

## 2021-02-19 NOTE — PROGRESS NOTES
Progress Note - Infectious Disease   Michael Larkin 56 y o  male MRN: [id number]  Unit/Bed#: [facility] 726-01 Encounter: [id number]      Impression/Plan:  1  Left-sided candidal empyema-unclear primary process   No clear evidence of an esophageal perforation at least clinically and radiographically   Consideration for the possibility of a pneumonia with colonization of the airways with Candida and a spontaneous pneumothorax contaminating the pleural space   Consideration for the possibility of a transient fungemia which is now resolved   Fortunately the patient remains hemodynamically stable   He has undergone chest tube drainage and is receiving tPA installations   He has clinically improved   Tolerating oral antifungals without difficulty  EKG with prolonged QT in the setting of methadone and fluconazole  Patient had chest tube removed  -continue micafungin through 3/12/2022 to complete 4 weeks total treatment  -no additional fluconazole  -may eventually need decortication  -check barium swallow to make sure no leak  -recheck CBC with diff and CMP  -pulmonary follow-up     2   Fever-suspect all secondary to the fungal empyema   The blood cultures remain negative and the patient remains hemodynamically stable   He seems to be tolerating the antifungals without difficulty   No recurrence   -antifungals as above  -follow-up blood cultures  -no additional ID workup for now     3  COVID 19 pneumonia-status post the initiation of the severe treatment protocol   Patient has now completed that treatment  Berto Wall did not receive any antivirals such as remdesivir or plasma due to the late phase of illness in which treatment was initiated   Is much improved clinically  -no additional COVID-19 directed treatment  -monitor respiratory status     4  Acute hypoxic respiratory failure-likely multifactorial including COVID-19 pneumonia, and now hydropneumothorax in the setting of presumptive candidal empyema   The patient is still requiring oxygen by nasal cannula intermittently  -O2 support  -wean off oxygen as able  -no additional ID workup for now     5  Acute right lower extremity ischemia-with suspected rhabdomyolysis   The patient is now status 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  Deisy Butt has been continuing on anticoagulation  No local wound cellulitis noted  -no additional directed antibiotics  -vascular follow-up  -local wound care     6  Diabetes mellitus-type 2 with long-term insulin use and hyperglycemia    Discussed the above management plan with the primary service    Continue to follow the patient intermittently    Antibiotics:  Micafungin restart 3  Antifungal 7    Subjective:  Patient has no fever, chills, sweats; patient with an episode of emesis this morning but no current nausea, no diarrhea; no cough, shortness of breath; no pain  No new symptoms  Objective:  Vitals:  Temp:  [98 3 °F (36 8 °C)] 98 3 °F (36 8 °C)  HR:  [75-85] 75  BP: (128-135)/(74-86) 135/74  SpO2:  [95 %-96 %] 96 %  Temp (24hrs), Av 3 °F (36 8 °C), Min:98 3 °F (36 8 °C), Max:98 3 °F (36 8 °C)  Current: Temperature: 98 3 °F (36 8 °C)    Physical Exam:   General Appearance:  Alert, interactive, nontoxic, no acute distress  Throat: Oropharynx moist without lesions  Lungs:   Decreased breath sounds bilaterally; no wheezes, rhonchi or rales; respirations unlabored   Heart:  RRR; no murmur, rub or gallop   Abdomen:   Soft, non-tender, non-distended, positive bowel sounds  Extremities: No clubbing, cyanosis or edema   Skin: No new rashes or lesions  No draining wounds noted         Labs, Imaging, & Other studies:   All pertinent labs and imaging studies were personally reviewed  Results from last 7 days   Lab Units 21  0951 21  0605 21  2048  21  0532  02/15/21  0558   WBC Thousand/uL  --  8 87  --   --  7 21  --  9 76   HEMOGLOBIN g/dL 8 2* 8 7* 7 9*   < > 7 7*   < > 8 0*   PLATELETS Thousands/uL  --  387  --   --  261  --  287    < > = values in this interval not displayed  Results from last 7 days   Lab Units 02/19/21  0605 02/18/21  0644 02/16/21  0532   SODIUM mmol/L 143 138 137   POTASSIUM mmol/L 3 2* 3 2* 3 6   CHLORIDE mmol/L 105 99* 101   CO2 mmol/L 33* 35* 32   BUN mg/dL 7 9 16   CREATININE mg/dL 0 43* 0 35* 0 46*   EGFR ml/min/1 73sq m 124 134 120   CALCIUM mg/dL 7 4* 8 3 7 7*   AST U/L  --   --  13   ALT U/L  --   --  10*   ALK PHOS U/L  --   --  80     Results from last 7 days   Lab Units 02/14/21  0218 02/14/21  0217   BLOOD CULTURE  No Growth After 5 Days  No Growth After 5 Days       Results from last 7 days   Lab Units 02/13/21 2036   PROCALCITONIN ng/ml 0 30*

## 2021-02-19 NOTE — PROGRESS NOTES
02/19/21 1300   Clinical Encounter Type   Visited With Patient   Sacramental Encounters   Sacrament of Sick-Anointing Anointed

## 2021-02-19 NOTE — ASSESSMENT & PLAN NOTE
· Profound weakness, predominantly in right upper extremity, especially due to critical illness and steroid use  · During hospitalization, stroke alert was called because of concern for worsening right upper extremity weakness  CT head showed questionable hypodensity but no evidence of acute stroke or bleeding  · Patient currently on therapeutic Lovenox  · Strength currently 2 to 3/5 at baseline  · Evaluated by Neurology  No further inpatient workup required  · Right upper extremity x-ray per family request showed no acute intraosseous abnormality

## 2021-02-19 NOTE — ASSESSMENT & PLAN NOTE
· Extubated 1/25 and required NGT for nutrition   · Speech therapy following   · S/p barium swallow - no evidence of leakage  · Continue modified diet  · Continue robinul  · S/p ENT consult, no vocal cord motion impairment  · Status post video barium swallow on 02/16

## 2021-02-19 NOTE — PLAN OF CARE
Problem: OCCUPATIONAL THERAPY ADULT  Goal: Performs self-care activities at highest level of function for planned discharge setting  See evaluation for individualized goals  Description: Treatment Interventions: ADL retraining, Functional transfer training, UE strengthening/ROM, Endurance training, Cognitive reorientation, Patient/family training, Equipment evaluation/education, Compensatory technique education, Continued evaluation, Activityengagement          See flowsheet documentation for full assessment, interventions and recommendations  Note: Limitation: Decreased ADL status, Decreased Safe judgement during ADL, Decreased UE strength, Decreased UE ROM, Decreased cognition, Decreased endurance, Decreased self-care trans, Decreased high-level ADLs, Decreased sensation, Decreased fine motor control, Visual deficit  Prognosis: Fair  Assessment: Pt is seen for OT treatment session 2/19/21 including interventions to: increase functional use/strength R UE, improve trunk/postural strength, improve bed mobility, improve sitting balance and tolerance, increase independence with eating with compensatory strategies  Pt's reported goals are to be able to go to the bathroom and use his R hand  In comparison to previous sessions, pt with improved activity tolerance, sitting balance, endurance, R UE strength, and bed mobility  Pt is most motivated when giving very specific and short term goals (ex: goal is to sit for 10 minutes today) and frequent examples of progress he has made during POC   "I see other people and I just think, wow, they have all their limbs"  Towards end of session, pt expressing that he has been feeling increasingly hopeless and depressed  He denies specific suicidal ideation or thoughts of self harm, but reports "I wish I was just on hospice"  "I have to get home and pay my taxes and I can't even hold a pen "  ROSSY DEVI notified    Pt provided with builtup foam handles to increase independence with eating and grooming tasks, and RN notified to increase carry over during hospitalization  Pt also provided with stress ball and recommended to use intermittently throughout the day (AROM/AAROM with L hand assisting) to facilitate increased carry over with exercises in OT to maximize functional gains  Pt continues to function below his functional baseline and is to continue to benefit from skilled occupational therapy services while in the hospital to maximize functioning and independence with daily activities  OT to continue to recommend STR upon d/c      OT Discharge Recommendation: Post-Acute Rehabilitation Services  OT - OK to Discharge:  Yes

## 2021-02-19 NOTE — PHYSICAL THERAPY NOTE
Physical Therapy Treatment Note     Patient Name: Vignesh Kelly    AOXVM'T Date: 2/19/2021     Problem List  Principal Problem:    Pneumonia due to COVID-19 virus  Active Problems:    HTN (hypertension)    Methadone maintenance therapy patient ([facility] )    BMI 28 0-28 9,adult    Type 2 diabetes mellitus, with long-term current use of insulin (HCC)    Acute respiratory failure with hypoxia (HCC)    Sepsis due to COVID-19 [facility])    Aortic thrombus (HCC)    Anemia    Muscle weakness of right upper extremity    Oropharyngeal dysphagia    Edema of right upper extremity    Ischemia of right lower extremity with suspected rhabdomyolysis    Urinary retention    Right femoral vein DVT (HCC)    Fever    Left Hydropneumothorax    RUE weakness    Acute encephalopathy    Prolonged Q-T interval on ECG       Past Medical History  Past Medical History:   Diagnosis Date    Diabetes mellitus ([place] [address] )     GERD (gastroesophageal reflux disease)     Hypercholesteremia     Hypertension     Methadone use ([place] [address] )         Past Surgical History  Past Surgical History:   Procedure Laterality Date    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    Procedure: AMPUTATION ABOVE KNEE (AKA);   Surgeon: Martha Major MD;  [facility];  Service: Vascular    AMPUTATION ABOVE KNEE (AKA) Right 1/18/2021    Procedure: AMPUTATION ABOVE KNEE (AKA) FORMALIZATION,  R AKA wound washout, wound closure;  Surgeon: Martha Major MD;  [facility];  Service: Vascular    ARTERIOGRAM Right 1/13/2021    Procedure: ARTERIOGRAM;  Surgeon: Cheyenne Russell DO;  [facility];  Service: Vascular    IR CHEST TUBE PLACEMENT  2/10/2021    IR LOWER EXTREMITY ANGIOGRAM  1/14/2021    THROMBECTOMY W/ EMBOLECTOMY Right 1/13/2021    Procedure: EMBOLECTOMY/THROMBECTOMY LOWER EXTREMITY;  Surgeon: Cheyenne Russell DO;  [facility];  Service: Vascular         02/19/21 1023   PT Last Visit PT Visit Date 02/19/21   Note Type   Note Type Treatment   Cancel Reasons Patient off floor/test   Pain Assessment   Pain Assessment Tool 0-10   Pain Score 8   Pain Location/Orientation Location: Buttocks   Hospital Pain Intervention(s) Ambulation/increased activity;Repositioned   Restrictions/Precautions   Weight Bearing Precautions Per Order Yes   RLE Weight Bearing Per Order NWB  (s/p AKA)   Other Precautions Cognitive; Chair Alarm; Bed Alarm; Fall Risk   General   Chart Reviewed Yes   Cognition   Arousal/Participation Cooperative   Attention Attends with cues to redirect   Orientation Level Oriented to person;Oriented to place;Oriented to time   Memory Decreased recall of precautions;Decreased recall of recent events   Following Commands Follows one step commands without difficulty   Comments Very pleasant throughout PT session  Continues to say "I can't" when attempted a task but always willing to try  Subjective   Subjective "I had a rough night"   Bed Mobility   Rolling R 4  Minimal assistance   Additional items Assist x 1; Increased time required;Verbal cues   Rolling L 3  Moderate assistance   Additional items Assist x 1; Increased time required;Verbal cues;LE management   Supine to Sit 3  Moderate assistance   Additional items Assist x 1; Increased time required;Verbal cues;LE management;HOB elevated; Bedrails   Sit to Supine 5  Supervision   Additional items Assist x 1; Increased time required;Verbal cues;LE management   Additional Comments Completed rolling to both sides approx x4 each for pericare and proper position  With encourgement, pt able to move LLE and weight shift towards EOB without assistnace but required A for upper body to move from supine to sit  Able to move from supine to sit without physical assistance with a few trials to get his LLE on to the bed   Log sitting with bed elevated approx 40 degrees, min A x 2 - x5 reps   Transfers   Sit to Stand Unable to assess   Additional Comments Not appropriate 2* fatigue   Balance   Static Sitting Fair -   Dynamic Sitting Poor +   Endurance Deficit   Endurance Deficit Yes   Endurance Deficit Description fatigue   Activity Tolerance   Activity Tolerance Patient limited by fatigue;Patient limited by pain   Medical Staff Made Aware DARRYN Williamson   Nurse Made Aware Yes   Exercises   Hip Abduction Supine;10 reps;AROM; Right  (side-lying)   Hip Extension Sitting;AROM;10 reps;Right  (Side-lying)   Knee AROM Long Arc Quad Sitting;Left;10 reps;AAROM   Heel Cord Stretch Sitting;PROM; Left  (30 second holds x 3)   Balance training  Sitting EOB for approx ~10 minutes, cues for head control  Min A x 1 for dynamic sitting  Assessment   Prognosis Fair   Problem List Decreased strength;Decreased endurance; Impaired balance;Decreased mobility; Decreased skin integrity   Assessment Pt seen for physical therapy session with a focus on bed mobility, functional transfers, there-ex, and activity tolerance  Today pt showed improved in bed mobility, able to complete supine to sit transfer with mod A x 1 and required no physical assistance to move from sit to supine  Did take pt a few trials to be able to lift LLE up and on to bed  Today, pt tolerated sitting EOB for approx ~10 minutes with encouragement  Tolerated there-ex well today  Continue to recommend STR upon discharge  Pt to benefit from continued inpatient PT services  Barriers to Discharge Inaccessible home environment;Decreased caregiver support   Goals   Patient Goals To drive   STG Expiration Date 02/26/21   PT Treatment Day 7   Plan   Treatment/Interventions LE strengthening/ROM;ADL retraining; Endurance training; Therapeutic exercise;Patient/family training;Bed mobility   Progress Progressing toward goals   PT Frequency   (3-5x/wk)   Recommendation   PT Discharge Recommendation Post-Acute Rehabilitation Services   Equipment Recommended Wheelchair   PT - OK to Discharge Yes   Additional Comments to STR once medically stable   AM-PAC Basic Mobility Inpatient   Turning in Bed Without Bedrails 3   Lying on Back to Sitting on Edge of Flat Bed 2   Moving Bed to Chair 1   Standing Up From Chair 1   Walk in Room 1   Climb 3-5 Stairs 1   Basic Mobility Inpatient Raw Score 9   Turning Head Towards Sound 4   Follow Simple Instructions 4   Low Function Basic Mobility Raw Score 17   Low Function Basic Mobility Standardized Score 27 46       Carissa Brand PT, DPT

## 2021-02-19 NOTE — PROGRESS NOTES
Progress Note Vera Tracey 1960, 56 y o  male MRN: [id number]    Unit/Bed#: [facility] 726-01 Encounter: [id number]    Primary Care Provider: Crista Schlatter, CRNP   Date and time admitted to hospital: 1/13/2021  2:28 PM        * Pneumonia due to COVID-19 virus  Assessment & Plan  · Tested positive for covid 12/31/2020  · S/p completion of covid tx  · S/p completion of IV abx for possible superimposed bacterial pna  · Considered recovered; off isolation    Left Hydropneumothorax  Assessment & Plan  Candida empyema  Status post pigtail  On 2/17/21  Now on room air  Pulmonology and thoracic surgery following  ID on board - fluconazole switched to micafungin due to prolonged QTC along with methadone use  PICC line consent obtained and will need PICC line for IV micafungin until 03/12/2021  Follow-up in the thoracic office 1 month after the discharge from the hospital with chest x-ray PA and lateral results    Prolonged Q-T interval on ECG  Assessment & Plan  -the patient has prolonged QTC of 487, patient is on methadone and fluconazole  -Patient is on methadone for years  -Patient fluconazole was switched to micafungin per y ID    Acute respiratory failure with hypoxia ([address] )  Assessment & Plan  · Secondary to COVID 19  · Extubated 1/25  · Now resolved and on room air  Ischemia of right lower extremity with suspected rhabdomyolysis  Assessment & Plan  · Suspected embolic from aortic thrombus  · 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  · Continue therapeutic Lovenox , pain management  Will need to discuss regarding oral anticoagulation on discharge  · Currently stable from a vascular surgery standpoint    Acute encephalopathy  Assessment & Plan  Now resolved  Most likely toxic metabolic due to methadone and gabapentin  Continue to hold gabapentin  Methadone restarted at home dose as patient is on chronic methadone therapy and is requesting for his pain  Avoid any further sedating medications      Fever  Assessment & Plan  Now resolved  As above    Right femoral vein DVT (HCC)  Assessment & Plan  Continue therapeutic Lovenox  Edema of right upper extremity  Assessment & Plan  · +2 pitting edema also with significant weakness/paresis   · S/p Venous duplex on 01/26 negative for acute or chronic DVT   · Resumed back on lasix with some improvement    Oropharyngeal dysphagia  Assessment & Plan  · Extubated 1/25 and required NGT for nutrition   · Speech therapy following   · S/p barium swallow - no evidence of leakage  · Continue modified diet  · Continue robinul  · S/p ENT consult, no vocal cord motion impairment  · Status post video barium swallow on 02/16  Muscle weakness of right upper extremity  Assessment & Plan  · Profound weakness, predominantly in right upper extremity, especially due to critical illness and steroid use  · During hospitalization, stroke alert was called because of concern for worsening right upper extremity weakness  CT head showed questionable hypodensity but no evidence of acute stroke or bleeding  · Patient currently on therapeutic Lovenox  · Strength currently 2 to 3/5 at baseline  · Evaluated by Neurology  No further inpatient workup required  · Right upper extremity x-ray per family request showed no acute intraosseous abnormality  Anemia  Assessment & Plan  · Secondary to critical illness, no sign of acute blood loss  · Neg stool occult  · Hemoglobin stable around his baseline  · Continue to monitor and transfuse if < 7    Aortic thrombus (HCC)  Assessment & Plan  · With embolic event that compromised right femoral artery  S/p urgent R guilshuine AKA 1/14    S/p R AKA formalization 1/18   · Continue therapeutic lovenox  · F/u with vascular surgery as outpatient    Type 2 diabetes mellitus, with long-term current use of insulin [facility])  Assessment & Plan  Lab Results   Component Value Date    HGBA1C 11 6 (H) 12/09/2020       Recent Labs     02/18/21 2127 02/18/21  2239 02/19/21  1440 02/19/21  1047   POCGLU 83 118 115 127       Blood Sugar Average: Last 72 hrs:  (P) 91 9     · BBG on the lower side, patient does not eating much due to dysphagia  · Continue Lantus 10 units + SSI  · Discontinue 3 units Humalog t i d  With meals  BMI 28 0-28 9,adult  Assessment & Plan  Patient presenting with malnutrition  S/p nutrition assessment        Methadone maintenance therapy patient [facility])  Assessment & Plan  Chronic methadone use  Methadone was held yesterday due to lethargy yesterday  Restarted methadone today as patient requested due to significant limb pain  And he is on chronic methadone therapy  Will continue to hold gabapentin for now to avoid over sedation and lethargy  Fluconazole discontinued due to prolonged QTC with concomitant methadone fluconazole use  HTN (hypertension)  Assessment & Plan  · Stable  · Continue lisinopril, lasix      VTE Pharmacologic Prophylaxis:   Pharmacologic: Enoxaparin (Lovenox)  Mechanical VTE Prophylaxis in Place: Yes    Patient Centered Rounds: I have performed bedside rounds with nursing staff today  Discussions with Specialists or Other Care Team Provider:  Yes    Education and Discussions with Family / Patient:  Yes    Time Spent for Care: 45 minutes  More than 50% of total time spent on counseling and coordination of care as described above  Current Length of Stay: 37 day(s)    Current Patient Status: Inpatient   Certification Statement: The patient will continue to require additional inpatient hospital stay due to Acute hypoxic respiratory failure    Discharge Plan / Estimated Discharge Date:  Yes, pending placement      Code Status: Level 1 - Full Code      Subjective:   Patient was seen and examined at bedside  The patient reports feeling right upper extremity weakness  X-ray of right upper extremity per family request showed no acute bone abnormality  Patient is moving his right arm spontaneously without significant pain    He denies any fever, chills, cough, chest pain, palpitation, shortness of breath, N/V, abd pain  Objective:     Vitals:   Temp (24hrs), Av 3 °F (36 8 °C), Min:98 3 °F (36 8 °C), Max:98 3 °F (36 8 °C)    Temp:  [98 3 °F (36 8 °C)] 98 3 °F (36 8 °C)  HR:  [75-85] 75  BP: (128-135)/(74-86) 135/74  SpO2:  [95 %-96 %] 96 %  Body mass index is 23 68 kg/m²  Input and Output Summary (last 24 hours): Intake/Output Summary (Last 24 hours) at 2021 1217  Last data filed at 2021 0800  Gross per 24 hour   Intake 1180 ml   Output 750 ml   Net 430 ml       Physical Exam:     Physical Exam  General: Elderly male patient lying in bed, breathing well on room air, no acute distress  HEENT: NC/AT, PERRL, EOM - normal  Neck: Supple  Pulm/Chest: Normal chest wall expansion, decreased breath sounds on both side, no wheezing/rhonchi or crackles appreciated  CVS: RRR, normal S1&S2, no murmur appreciated, capillary refill <2s  Abd: soft, non tender, non distended, bowel sounds +  MSK: move all 4 extremities spontaneously  Skin: warm  CNS: AAOx3, no acute focal neuro deficit, Motor 4/5 strength, no sensory deficit      Additional Data:     Labs:    Results from last 7 days   Lab Units 21  0951 21  0621  0532   WBC Thousand/uL  --  8 87  --  7 21   HEMOGLOBIN g/dL 8 2* 8 7*   < > 7 7*   HEMATOCRIT % 27 5* 29 1*   < > 25 4*   PLATELETS Thousands/uL  --  387  --  261   NEUTROS PCT %  --   --   --  76*   LYMPHS PCT %  --   --   --  11*   MONOS PCT %  --   --   --  9   EOS PCT %  --   --   --  2    < > = values in this interval not displayed       Results from last 7 days   Lab Units 21  0605  21  0532   POTASSIUM mmol/L 3 2*   < > 3 6   CHLORIDE mmol/L 105   < > 101   CO2 mmol/L 33*   < > 32   BUN mg/dL 7   < > 16   CREATININE mg/dL 0 43*   < > 0 46*   CALCIUM mg/dL 7 4*   < > 7 7*   ALK PHOS U/L  --   --  80   ALT U/L  --   --  10*   AST U/L  --   --  13    < > = values in this interval not displayed  * I Have Reviewed All Lab Data Listed Above  * Additional Pertinent Lab Tests Reviewed: Jaime 64 Admission Reviewed    Imaging:    Imaging Reports Reviewed Today Include:  X-ray right arm  Imaging Personally Reviewed by Myself Includes:  None      Recent Cultures (last 7 days):     Results from last 7 days   Lab Units 02/14/21 0218 02/14/21 0217   BLOOD CULTURE  No Growth After 5 Days  No Growth After 5 Days         Last 24 Hours Medication List:   Current Facility-Administered Medications   Medication Dose Route Frequency Provider Last Rate    acetaminophen  650 mg Rectal Q6H PRN Stephanie Shaffer MD      acetaminophen  650 mg Oral Q6H PRN VERONICA Kennedy      albumin human  25 g Intravenous Once Stephanie Shaffer MD Stopped (02/15/21 1715)    albuterol  2 puff Inhalation Q4H PRN Hermila Shelley DO      bisacodyl  10 mg Rectal Daily PRN [employer], VERONICA      cholecalciferol  2,000 Units Oral Daily VERONICA Kennedy      Diclofenac Sodium  4 g Topical 4x Daily VERONICA Kennedy      enoxaparin  1 mg/kg Subcutaneous Q12H [facility] Stephanie Shaffer MD      furosemide  40 mg Oral Daily Hermila Shelley DO      glycopyrrolate  1 mg Oral TID VERONICA Kennedy      insulin glargine  4 Units Subcutaneous HS Master Huston MD      insulin lispro  1-5 Units Subcutaneous TID AC Georgi Caban MD      Lidocaine Viscous HCl  15 mL Swish & Spit 4x Daily PRN VERONICA Kennedy      lisinopril  10 mg Oral Daily [facility]annan [facility]ENDOZA [facility]      menthol-methyl salicylate   Apply externally 4x Daily PRN [employer], VERONICA      methadone  70 mg Oral Daily Eugene Gómez MD      micafungin  100 mg Intravenous Q24H Eugene Gómez MD Stopped (02/18/21 1915)   Kathrine Favorite ANTIFUNGAL  1 application Topical TID Hetul MccallDO      multivitamin-minerals  1 tablet Oral Daily VERONICA Kennedy      omeprazole (PRILOSEC) suspension 2 mg/mL 20 mg Oral Early Morning Shannan Hopkins, STONENP      ondansetron  4 mg Intravenous Q6H PRN Toby Beyer PA-C      phenol  1 spray Mouth/Throat Q2H PRN Shannan A Sandeep, CRNP      polyethylene glycol  17 g Oral Daily Shannan A Rayoora, CRNP      senna  1 tablet Oral BID Shannan A Rayoora, CRNP      sodium chloride  1 spray Each Nare Q1H PRN Sissy Lopez,       sodium chloride  1,000 mL Intravenous Once Ernestine Bhandari MD Stopped (02/15/21 2606)        Today, Patient Was Seen By: Lynn Pennington MD    ** Please Note: Dragon 360 Dictation voice to text software may have been used in the creation of this document   **

## 2021-02-19 NOTE — ASSESSMENT & PLAN NOTE
Candida empyema  Status post pigtail  On 2/17/21  Now on room air  Pulmonology and thoracic surgery following  ID on board - fluconazole switched to micafungin due to prolonged QTC along with methadone use  PICC line consent obtained and will need PICC line for IV micafungin until 03/12/2021      Follow-up in the thoracic office 1 month after the discharge from the hospital with chest x-ray PA and lateral results

## 2021-02-20 PROBLEM — G93.40 ACUTE ENCEPHALOPATHY: Status: RESOLVED | Noted: 2021-02-15 | Resolved: 2021-02-20

## 2021-02-20 PROBLEM — R50.9 FEVER: Status: RESOLVED | Noted: 2021-02-09 | Resolved: 2021-02-20

## 2021-02-20 PROBLEM — R33.9 URINARY RETENTION: Status: RESOLVED | Noted: 2021-02-07 | Resolved: 2021-02-20

## 2021-02-20 LAB
ANION GAP SERPL CALCULATED.3IONS-SCNC: 5 MMOL/L (ref 4–13)
ATRIAL RATE: 86 BPM
BASOPHILS # BLD AUTO: 0.05 THOUSANDS/ΜL (ref 0–0.1)
BASOPHILS NFR BLD AUTO: 1 % (ref 0–1)
BUN SERPL-MCNC: 7 MG/DL (ref 5–25)
CALCIUM SERPL-MCNC: 7.7 MG/DL (ref 8.3–10.1)
CHLORIDE SERPL-SCNC: 105 MMOL/L (ref 100–108)
CO2 SERPL-SCNC: 31 MMOL/L (ref 21–32)
CREAT SERPL-MCNC: 0.42 MG/DL (ref 0.6–1.3)
EOSINOPHIL # BLD AUTO: 0.11 THOUSAND/ΜL (ref 0–0.61)
EOSINOPHIL NFR BLD AUTO: 1 % (ref 0–6)
ERYTHROCYTE [DISTWIDTH] IN BLOOD BY AUTOMATED COUNT: 16.4 % (ref 11.6–15.1)
GFR SERPL CREATININE-BSD FRML MDRD: 125 ML/MIN/1.73SQ M
GLUCOSE SERPL-MCNC: 109 MG/DL (ref 65–140)
GLUCOSE SERPL-MCNC: 114 MG/DL (ref 65–140)
GLUCOSE SERPL-MCNC: 178 MG/DL (ref 65–140)
GLUCOSE SERPL-MCNC: 209 MG/DL (ref 65–140)
GLUCOSE SERPL-MCNC: 95 MG/DL (ref 65–140)
HCT VFR BLD AUTO: 27.1 % (ref 36.5–49.3)
HGB BLD-MCNC: 8 G/DL (ref 12–17)
IMM GRANULOCYTES # BLD AUTO: 0.08 THOUSAND/UL (ref 0–0.2)
IMM GRANULOCYTES NFR BLD AUTO: 1 % (ref 0–2)
LYMPHOCYTES # BLD AUTO: 1.09 THOUSANDS/ΜL (ref 0.6–4.47)
LYMPHOCYTES NFR BLD AUTO: 11 % (ref 14–44)
MAGNESIUM SERPL-MCNC: 1.5 MG/DL (ref 1.6–2.6)
MCH RBC QN AUTO: 27 PG (ref 26.8–34.3)
MCHC RBC AUTO-ENTMCNC: 29.5 G/DL (ref 31.4–37.4)
MCV RBC AUTO: 92 FL (ref 82–98)
MONOCYTES # BLD AUTO: 0.81 THOUSAND/ΜL (ref 0.17–1.22)
MONOCYTES NFR BLD AUTO: 8 % (ref 4–12)
NEUTROPHILS # BLD AUTO: 8.06 THOUSANDS/ΜL (ref 1.85–7.62)
NEUTS SEG NFR BLD AUTO: 78 % (ref 43–75)
NRBC BLD AUTO-RTO: 0 /100 WBCS
P AXIS: 52 DEGREES
PLATELET # BLD AUTO: 389 THOUSANDS/UL (ref 149–390)
PMV BLD AUTO: 10.1 FL (ref 8.9–12.7)
POTASSIUM SERPL-SCNC: 3.3 MMOL/L (ref 3.5–5.3)
PR INTERVAL: 150 MS
QRS AXIS: 47 DEGREES
QRSD INTERVAL: 72 MS
QT INTERVAL: 418 MS
QTC INTERVAL: 500 MS
RBC # BLD AUTO: 2.96 MILLION/UL (ref 3.88–5.62)
SODIUM SERPL-SCNC: 141 MMOL/L (ref 136–145)
T WAVE AXIS: 64 DEGREES
VENTRICULAR RATE: 86 BPM
WBC # BLD AUTO: 10.2 THOUSAND/UL (ref 4.31–10.16)

## 2021-02-20 PROCEDURE — 94664 DEMO&/EVAL PT USE INHALER: CPT

## 2021-02-20 PROCEDURE — 99233 SBSQ HOSP IP/OBS HIGH 50: CPT | Performed by: INTERNAL MEDICINE

## 2021-02-20 PROCEDURE — 94668 MNPJ CHEST WALL SBSQ: CPT

## 2021-02-20 PROCEDURE — 94760 N-INVAS EAR/PLS OXIMETRY 1: CPT

## 2021-02-20 PROCEDURE — 99232 SBSQ HOSP IP/OBS MODERATE 35: CPT | Performed by: INTERNAL MEDICINE

## 2021-02-20 PROCEDURE — 82948 REAGENT STRIP/BLOOD GLUCOSE: CPT

## 2021-02-20 PROCEDURE — 83735 ASSAY OF MAGNESIUM: CPT | Performed by: INTERNAL MEDICINE

## 2021-02-20 PROCEDURE — 80048 BASIC METABOLIC PNL TOTAL CA: CPT | Performed by: INTERNAL MEDICINE

## 2021-02-20 PROCEDURE — 93005 ELECTROCARDIOGRAM TRACING: CPT

## 2021-02-20 PROCEDURE — 85025 COMPLETE CBC W/AUTO DIFF WBC: CPT | Performed by: INTERNAL MEDICINE

## 2021-02-20 PROCEDURE — 93010 ELECTROCARDIOGRAM REPORT: CPT | Performed by: INTERNAL MEDICINE

## 2021-02-20 RX ORDER — POTASSIUM CHLORIDE 20 MEQ/1
40 TABLET, EXTENDED RELEASE ORAL ONCE
Status: DISCONTINUED | OUTPATIENT
Start: 2021-02-20 | End: 2021-02-20

## 2021-02-20 RX ORDER — POTASSIUM CHLORIDE 20 MEQ/1
40 TABLET, EXTENDED RELEASE ORAL 2 TIMES DAILY
Status: COMPLETED | OUTPATIENT
Start: 2021-02-20 | End: 2021-02-20

## 2021-02-20 RX ADMIN — ENOXAPARIN SODIUM 70 MG: 80 INJECTION SUBCUTANEOUS at 09:07

## 2021-02-20 RX ADMIN — Medication 20 MG: at 05:15

## 2021-02-20 RX ADMIN — POTASSIUM CHLORIDE 40 MEQ: 1500 TABLET, EXTENDED RELEASE ORAL at 17:12

## 2021-02-20 RX ADMIN — POTASSIUM CHLORIDE 40 MEQ: 1500 TABLET, EXTENDED RELEASE ORAL at 09:07

## 2021-02-20 RX ADMIN — FUROSEMIDE 40 MG: 40 TABLET ORAL at 09:07

## 2021-02-20 RX ADMIN — GLYCOPYRROLATE 1 MG: 1 TABLET ORAL at 22:18

## 2021-02-20 RX ADMIN — INSULIN GLARGINE 4 UNITS: 100 INJECTION, SOLUTION SUBCUTANEOUS at 22:18

## 2021-02-20 RX ADMIN — LISINOPRIL 10 MG: 10 TABLET ORAL at 09:07

## 2021-02-20 RX ADMIN — INSULIN LISPRO 1 UNITS: 100 INJECTION, SOLUTION INTRAVENOUS; SUBCUTANEOUS at 11:29

## 2021-02-20 RX ADMIN — GLYCOPYRROLATE 1 MG: 1 TABLET ORAL at 17:12

## 2021-02-20 RX ADMIN — MAGNESIUM OXIDE TAB 400 MG (241.3 MG ELEMENTAL MG) 400 MG: 400 (241.3 MG) TAB at 17:12

## 2021-02-20 RX ADMIN — MICAFUNGIN SODIUM 100 MG: 50 INJECTION, POWDER, LYOPHILIZED, FOR SOLUTION INTRAVENOUS at 17:14

## 2021-02-20 RX ADMIN — ENOXAPARIN SODIUM 70 MG: 80 INJECTION SUBCUTANEOUS at 22:17

## 2021-02-20 RX ADMIN — SENNOSIDES 8.6 MG: 8.6 TABLET, FILM COATED ORAL at 09:06

## 2021-02-20 RX ADMIN — Medication 1 TABLET: at 09:09

## 2021-02-20 RX ADMIN — MICONAZOLE NITRATE 1 APPLICATION: 20 CREAM TOPICAL at 17:13

## 2021-02-20 RX ADMIN — METHADONE HYDROCHLORIDE 70 MG: 10 TABLET ORAL at 09:08

## 2021-02-20 RX ADMIN — MICONAZOLE NITRATE 1 APPLICATION: 20 CREAM TOPICAL at 09:09

## 2021-02-20 RX ADMIN — Medication 2000 UNITS: at 09:06

## 2021-02-20 RX ADMIN — GLYCOPYRROLATE 1 MG: 1 TABLET ORAL at 09:06

## 2021-02-20 RX ADMIN — ACETAMINOPHEN 650 MG: 325 TABLET, FILM COATED ORAL at 06:02

## 2021-02-20 NOTE — PLAN OF CARE
Problem: Prexisting or High Potential for Compromised Skin Integrity  Goal: Skin integrity is maintained or improved  Description: INTERVENTIONS:  - Identify patients at risk for skin breakdown  - Assess and monitor skin integrity  - Assess and monitor nutrition and hydration status  - Monitor labs   - Assess for incontinence   - Turn and reposition patient  - Assist with mobility/ambulation  - Relieve pressure over bony prominences  - Avoid friction and shearing  - Provide appropriate hygiene as needed including keeping skin clean and dry  - Evaluate need for skin moisturizer/barrier cream  - Collaborate with interdisciplinary team   - Patient/family teaching  - Consider wound care consult   Outcome: Progressing     Problem: Potential for Falls  Goal: Patient will remain free of falls  Description: INTERVENTIONS:  - Assess patient frequently for physical needs  -  Identify cognitive and physical deficits and behaviors that affect risk of falls  -  [place] fall precautions as indicated by assessment   - Educate patient/family on patient safety including physical limitations  - Instruct patient to call for assistance with activity based on assessment  - Modify environment to reduce risk of injury  - Consider OT/PT consult to assist with strengthening/mobility  Outcome: Progressing     Problem: Nutrition/Hydration-ADULT  Goal: Nutrient/Hydration intake appropriate for improving, restoring or maintaining nutritional needs  Description: Monitor and assess patient's nutrition/hydration status for malnutrition  Collaborate with interdisciplinary team and initiate plan and interventions as ordered  Monitor patient's weight and dietary intake as ordered or per policy  Utilize nutrition screening tool and intervene as necessary  Determine patient's food preferences and provide high-protein, high-caloric foods as appropriate       INTERVENTIONS:  - Monitor oral intake, urinary output, labs, and treatment plans  - Assess nutrition and hydration status and recommend course of action  - Evaluate amount of meals eaten  - Assist patient with eating if necessary   - Allow adequate time for meals  - Recommend/ encourage appropriate diets, oral nutritional supplements, and vitamin/mineral supplements  - Order, calculate, and assess calorie counts as needed  - Assess need for intravenous fluids  - Provide specific nutrition/hydration education as appropriate  - Include patient/family/caregiver in decisions related to nutrition  Outcome: Progressing     Problem: PAIN - ADULT  Goal: Verbalizes/displays adequate comfort level or baseline comfort level  Description: Interventions:  - Encourage patient to monitor pain and request assistance  - Assess pain using appropriate pain scale  - Administer analgesics based on type and severity of pain and evaluate response  - Implement non-pharmacological measures as appropriate and evaluate response  - Consider cultural and social influences on pain and pain management  - Notify physician/advanced practitioner if interventions unsuccessful or patient reports new pain  Outcome: Progressing     Problem: INFECTION - ADULT  Goal: Absence or prevention of progression during hospitalization  Description: INTERVENTIONS:  - Assess and monitor for signs and symptoms of infection  - Monitor lab/diagnostic results  - Monitor all insertion sites, i e  indwelling lines, tubes, and drains  - [place] appropriate cooling/warming therapies per order  - Administer medications as ordered  - Instruct and encourage patient and family to use good hand hygiene technique  - Identify and instruct in appropriate isolation precautions for identified infection/condition  Outcome: Progressing     Problem: SAFETY ADULT  Goal: Patient will remain free of falls  Description: INTERVENTIONS:  - Assess patient frequently for physical needs  -  Identify cognitive and physical deficits and behaviors that affect risk of falls    - [place] fall precautions as indicated by assessment   - Educate patient/family on patient safety including physical limitations  - Instruct patient to call for assistance with activity based on assessment  - Modify environment to reduce risk of injury  - Consider OT/PT consult to assist with strengthening/mobility  Outcome: Progressing  Goal: Maintain or return to baseline ADL function  Description: INTERVENTIONS:  -  Assess patient's ability to carry out ADLs; assess patient's baseline for ADL function and identify physical deficits which impact ability to perform ADLs (bathing, care of mouth/teeth, toileting, grooming, dressing, etc )  - Assess/evaluate cause of self-care deficits   - Assess range of motion  - Assess patient's mobility; develop plan if impaired  - Assess patient's need for assistive devices and provide as appropriate  - Encourage maximum independence but intervene and supervise when necessary  - Involve family in performance of ADLs  - Assess for home care needs following discharge   - Consider OT consult to assist with ADL evaluation and planning for discharge  - Provide patient education as appropriate  Outcome: Progressing  Goal: Maintain or return mobility status to optimal level  Description: INTERVENTIONS:  - Assess patient's baseline mobility status (ambulation, transfers, stairs, etc )    - Identify cognitive and physical deficits and behaviors that affect mobility  - Identify mobility aids required to assist with transfers and/or ambulation (gait belt, sit-to-stand, lift, walker, cane, etc )  - [place] fall precautions as indicated by assessment  - Record patient progress and toleration of activity level on Mobility SBAR; progress patient to next Phase/Stage  - Instruct patient to call for assistance with activity based on assessment  - Consider rehabilitation consult to assist with strengthening/weightbearing, etc   Outcome: Progressing     Problem: DISCHARGE PLANNING  Goal: Discharge to home or other facility with appropriate resources  Description: INTERVENTIONS:  - Identify barriers to discharge w/patient and caregiver  - Arrange for needed discharge resources and transportation as appropriate  - Identify discharge learning needs (meds, wound care, etc )  - Arrange for interpretive services to assist at discharge as needed  - Refer to Case Management Department for coordinating discharge planning if the patient needs post-hospital services based on physician/advanced practitioner order or complex needs related to functional status, cognitive ability, or social support system  Outcome: Progressing     Problem: Knowledge Deficit  Goal: Patient/family/caregiver demonstrates understanding of disease process, treatment plan, medications, and discharge instructions  Description: Complete learning assessment and assess knowledge base    Interventions:  - Provide teaching at level of understanding  - Provide teaching via preferred learning methods  Outcome: Progressing     Problem: Prexisting or High Potential for Compromised Skin Integrity  Goal: Skin integrity is maintained or improved  Description: INTERVENTIONS:  - Identify patients at risk for skin breakdown  - Assess and monitor skin integrity  - Assess and monitor nutrition and hydration status  - Monitor labs   - Assess for incontinence   - Turn and reposition patient  - Assist with mobility/ambulation  - Relieve pressure over bony prominences  - Avoid friction and shearing  - Provide appropriate hygiene as needed including keeping skin clean and dry  - Evaluate need for skin moisturizer/barrier cream  - Collaborate with interdisciplinary team   - Patient/family teaching  - Consider wound care consult   Outcome: Progressing     Problem: Potential for Falls  Goal: Patient will remain free of falls  Description: INTERVENTIONS:  - Assess patient frequently for physical needs  -  Identify cognitive and physical deficits and behaviors that affect risk of falls  -  [place] fall precautions as indicated by assessment   - Educate patient/family on patient safety including physical limitations  - Instruct patient to call for assistance with activity based on assessment  - Modify environment to reduce risk of injury  - Consider OT/PT consult to assist with strengthening/mobility  Outcome: Progressing     Problem: Nutrition/Hydration-ADULT  Goal: Nutrient/Hydration intake appropriate for improving, restoring or maintaining nutritional needs  Description: Monitor and assess patient's nutrition/hydration status for malnutrition  Collaborate with interdisciplinary team and initiate plan and interventions as ordered  Monitor patient's weight and dietary intake as ordered or per policy  Utilize nutrition screening tool and intervene as necessary  Determine patient's food preferences and provide high-protein, high-caloric foods as appropriate       INTERVENTIONS:  - Monitor oral intake, urinary output, labs, and treatment plans  - Assess nutrition and hydration status and recommend course of action  - Evaluate amount of meals eaten  - Assist patient with eating if necessary   - Allow adequate time for meals  - Recommend/ encourage appropriate diets, oral nutritional supplements, and vitamin/mineral supplements  - Order, calculate, and assess calorie counts as needed  - Assess need for intravenous fluids  - Provide specific nutrition/hydration education as appropriate  - Include patient/family/caregiver in decisions related to nutrition  Outcome: Progressing     Problem: PAIN - ADULT  Goal: Verbalizes/displays adequate comfort level or baseline comfort level  Description: Interventions:  - Encourage patient to monitor pain and request assistance  - Assess pain using appropriate pain scale  - Administer analgesics based on type and severity of pain and evaluate response  - Implement non-pharmacological measures as appropriate and evaluate response  - Consider cultural and social influences on pain and pain management  - Notify physician/advanced practitioner if interventions unsuccessful or patient reports new pain  Outcome: Progressing     Problem: INFECTION - ADULT  Goal: Absence or prevention of progression during hospitalization  Description: INTERVENTIONS:  - Assess and monitor for signs and symptoms of infection  - Monitor lab/diagnostic results  - Monitor all insertion sites, i e  indwelling lines, tubes, and drains  - [place] appropriate cooling/warming therapies per order  - Administer medications as ordered  - Instruct and encourage patient and family to use good hand hygiene technique  - Identify and instruct in appropriate isolation precautions for identified infection/condition  Outcome: Progressing     Problem: SAFETY ADULT  Goal: Patient will remain free of falls  Description: INTERVENTIONS:  - Assess patient frequently for physical needs  -  Identify cognitive and physical deficits and behaviors that affect risk of falls    -  [place] fall precautions as indicated by assessment   - Educate patient/family on patient safety including physical limitations  - Instruct patient to call for assistance with activity based on assessment  - Modify environment to reduce risk of injury  - Consider OT/PT consult to assist with strengthening/mobility  Outcome: Progressing  Goal: Maintain or return to baseline ADL function  Description: INTERVENTIONS:  -  Assess patient's ability to carry out ADLs; assess patient's baseline for ADL function and identify physical deficits which impact ability to perform ADLs (bathing, care of mouth/teeth, toileting, grooming, dressing, etc )  - Assess/evaluate cause of self-care deficits   - Assess range of motion  - Assess patient's mobility; develop plan if impaired  - Assess patient's need for assistive devices and provide as appropriate  - Encourage maximum independence but intervene and supervise when necessary  - Involve family in performance of ADLs  - Assess for home care needs following discharge   - Consider OT consult to assist with ADL evaluation and planning for discharge  - Provide patient education as appropriate  Outcome: Progressing  Goal: Maintain or return mobility status to optimal level  Description: INTERVENTIONS:  - Assess patient's baseline mobility status (ambulation, transfers, stairs, etc )    - Identify cognitive and physical deficits and behaviors that affect mobility  - Identify mobility aids required to assist with transfers and/or ambulation (gait belt, sit-to-stand, lift, walker, cane, etc )  - [place] fall precautions as indicated by assessment  - Record patient progress and toleration of activity level on Mobility SBAR; progress patient to next Phase/Stage  - Instruct patient to call for assistance with activity based on assessment  - Consider rehabilitation consult to assist with strengthening/weightbearing, etc   Outcome: Progressing     Problem: DISCHARGE PLANNING  Goal: Discharge to home or other facility with appropriate resources  Description: INTERVENTIONS:  - Identify barriers to discharge w/patient and caregiver  - Arrange for needed discharge resources and transportation as appropriate  - Identify discharge learning needs (meds, wound care, etc )  - Arrange for interpretive services to assist at discharge as needed  - Refer to Case Management Department for coordinating discharge planning if the patient needs post-hospital services based on physician/advanced practitioner order or complex needs related to functional status, cognitive ability, or social support system  Outcome: Progressing     Problem: Knowledge Deficit  Goal: Patient/family/caregiver demonstrates understanding of disease process, treatment plan, medications, and discharge instructions  Description: Complete learning assessment and assess knowledge base    Interventions:  - Provide teaching at level of understanding  - Provide teaching via preferred learning methods  Outcome: Progressing

## 2021-02-20 NOTE — PROGRESS NOTES
Progress Note Williams Liter 1960, 56 y o  male MRN: [id number]    Unit/Bed#: [facility] 726-01 Encounter: [id number]    Primary Care Provider: VERONICA Clifford   Date and time admitted to hospital: 1/13/2021  2:28 PM        Left Hydropneumothorax  Assessment & Plan  Candida empyema  Status post pigtail  On 2/17/21  Now on room air  Pulmonology and thoracic surgery following  ID on board - fluconazole switched to micafungin due to prolonged QTC along with methadone use  PICC line consent obtained and will need PICC line for IV micafungin until 03/12/2021  Follow-up in the thoracic office 1 month after the discharge from the hospital with chest x-ray PA and lateral results    Ischemia of right lower extremity with suspected rhabdomyolysis  Assessment & Plan  · Suspected embolic from aortic thrombus  · 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  · Continue therapeutic Lovenox  ; Eliquis scripts sent to patient's pharmacy for price check  · Currently stable from a vascular surgery standpoint    Prolonged Q-T interval on ECG  Assessment & Plan  -the patient has prolonged QTC of 487, patient is on methadone and fluconazole  -Patient is on methadone for years  -Patient fluconazole was switched to micafungin per y ID    Right femoral vein DVT (HCC)  Assessment & Plan  Continue therapeutic Lovenox  Eliquis script sent to patient's primary pharmacy for price check  Edema of right upper extremity  Assessment & Plan  · +2 pitting edema also with significant weakness/paresis   · S/p Venous duplex on 01/26 negative for acute or chronic DVT   · Resumed back on lasix with some improvement  · X-ray right humerus negative  Oropharyngeal dysphagia  Assessment & Plan  · Extubated 1/25 and required NGT for nutrition   · Speech therapy following   · S/p barium swallow - no evidence of leakage  · Continue modified diet  · Continue robinul  · S/p ENT consult, no vocal cord motion impairment    · Status post video barium swallow on 02/16     Muscle weakness of right upper extremity  Assessment & Plan  · Profound weakness, predominantly in right upper extremity, especially due to critical illness and steroid use  · During hospitalization, stroke alert was called because of concern for worsening right upper extremity weakness  CT head showed questionable hypodensity but no evidence of acute stroke or bleeding  · Patient currently on therapeutic Lovenox  · Strength currently 2 to 3/5 at baseline  · Evaluated by Neurology  No further inpatient workup required  · Right upper extremity x-ray per family request showed no acute intraosseous abnormality  Anemia  Assessment & Plan  · Secondary to critical illness, no sign of acute blood loss  · Neg stool occult  · Hemoglobin stable around his baseline  · Continue to monitor and transfuse if < 7    Aortic thrombus (HCC)  Assessment & Plan  · With embolic event that compromised right femoral artery  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  S/p R AKA formalization 1/18   · Continue therapeutic lovenox  · F/u with vascular surgery as outpatient    Acute respiratory failure with hypoxia ([address] )  Assessment & Plan  · Secondary to COVID 19  · Extubated 1/25  · Now resolved and on room air  Type 2 diabetes mellitus, with long-term current use of insulin [facility])  Assessment & Plan  Lab Results   Component Value Date    HGBA1C 11 6 (H) 12/09/2020       Recent Labs     02/19/21  1608 02/19/21  2046 02/20/21  0635 02/20/21  1052   POCGLU 107 124 114 178*       Blood Sugar Average: Last 72 hrs:  (P) 96 05     · BBG on the lower side, patient does not eating much due to dysphagia  · Continue Lantus 10 units + SSI  · Discontinue 3 units Humalog t i d  With meals  BMI 28 0-28 9,adult  Assessment & Plan        Methadone maintenance therapy patient [facility])  Assessment & Plan  Chronic methadone use  Methadone was held yesterday due to lethargy yesterday    Restarted methadone today as patient requested due to significant limb pain  And he is on chronic methadone therapy  Will continue to hold gabapentin for now to avoid over sedation and lethargy  Fluconazole discontinued due to prolonged QTC with concomitant methadone fluconazole use  HTN (hypertension)  Assessment & Plan  · Stable  · Continue lisinopril, lasix    Acute encephalopathy-resolved as of 2021  Assessment & Plan  Now resolved  Most likely toxic metabolic due to methadone and gabapentin  Continue to hold gabapentin  Methadone restarted at home dose as patient is on chronic methadone therapy and is requesting for his pain  Avoid any further sedating medications  Urinary retention-resolved as of 2021  Assessment & Plan  Resolved          VTE Pharmacologic Prophylaxis:   Pharmacologic: Enoxaparin (Lovenox)  Mechanical VTE Prophylaxis in Place: Yes    Patient Centered Rounds: I have performed bedside rounds with nursing staff today  Discussions with Specialists or Other Care Team Provider:      Education and Discussions with Family / Patient:  Discussed plan of care with patient and also with patient's nephew informed    Time Spent for Care: 30 minutes  More than 50% of total time spent on counseling and coordination of care as described above  Current Length of Stay: 38 day(s)    Current Patient Status: Inpatient   Certification Statement: The patient will continue to require additional inpatient hospital stay due to Pending placement    Discharge Plan:  Pending placement    Code Status: Level 1 - Full Code      Subjective:   No overnight events  No acute complaints  Patient feels overall better as compared to before  Objective:     Vitals:   Temp (24hrs), Av 4 °F (36 9 °C), Min:98 1 °F (36 7 °C), Max:98 6 °F (37 °C)    Temp:  [98 1 °F (36 7 °C)-98 6 °F (37 °C)] 98 1 °F (36 7 °C)  HR:  [75-87] 75  Resp:  [18] 18  BP: (126-145)/(70-75) 126/70  SpO2:  [94 %-95 %] 95 %  Body mass index is 23 68 kg/m²       Input and Output Summary (last 24 hours): Intake/Output Summary (Last 24 hours) at 2/20/2021 1346  Last data filed at 2/20/2021 1036  Gross per 24 hour   Intake --   Output 1025 ml   Net -1025 ml       Physical Exam:     Physical Exam  Constitutional: [place] is not in acute distress  Cardiovascular: Rate and Rhythm: Normal rate and regular rhythm  Heart sounds: Normal heart sounds  No murmur  Pulmonary: Effort: No respiratory distress  Breath sounds: No wheezing or rales  Coarse breath sounds  Chest tube noted  Abdominal: Bowel sounds are normal  There is no distension  soft  here is no abdominal tenderness  Musculoskeletal:   Right AKA noted   Neurological:      Mental Status: He is alert       Comments: Awake alert and communicative     Additional Data:     Labs:    Results from last 7 days   Lab Units 02/20/21  0402   WBC Thousand/uL 10 20*   HEMOGLOBIN g/dL 8 0*   HEMATOCRIT % 27 1*   PLATELETS Thousands/uL 389   NEUTROS PCT % 78*   LYMPHS PCT % 11*   MONOS PCT % 8   EOS PCT % 1     Results from last 7 days   Lab Units 02/20/21  0402  02/16/21  0532   SODIUM mmol/L 141   < > 137   POTASSIUM mmol/L 3 3*   < > 3 6   CHLORIDE mmol/L 105   < > 101   CO2 mmol/L 31   < > 32   BUN mg/dL 7   < > 16   CREATININE mg/dL 0 42*   < > 0 46*   ANION GAP mmol/L 5   < > 4   CALCIUM mg/dL 7 7*   < > 7 7*   ALBUMIN g/dL  --   --  1 2*   TOTAL BILIRUBIN mg/dL  --   --  0 73   ALK PHOS U/L  --   --  80   ALT U/L  --   --  10*   AST U/L  --   --  13   GLUCOSE RANDOM mg/dL 95   < > 121    < > = values in this interval not displayed           Results from last 7 days   Lab Units 02/20/21  1052 02/20/21  0635 02/19/21  2046 02/19/21  1608 02/19/21  1047 02/19/21  0748 02/18/21  2239 02/18/21  2127 02/18/21  1659 02/18/21  1518 02/18/21  1258 02/18/21  1153   POC GLUCOSE mg/dl 178* 114 124 107 127 115 118 83 78 100 69 60*         Results from last 7 days   Lab Units 02/13/21 2036   PROCALCITONIN ng/ml 0 30*           * I Have Reviewed All Lab Data Listed Above  * Additional Pertinent Lab Tests Reviewed: All Labs Within Last 24 Hours Reviewed    Imaging:    XR humerus right   Final Result by Silvestre Reich MD (02/18 1521)      No acute osseous abnormality  Workstation performed: [id number]         FL barium swallow   Final Result by Yoav Marie MD (02/18 1107)      No leak  Workstation performed: [id number]         XR chest pa & lateral   Final Result by Krissy Salcedo MD (02/18 0820)      Left hydropneumothorax, unchanged after removal of pigtail left chest drainage catheter  Workstation performed: [id number]         XR chest portable   Final Result by Abimbola Riggs MD (02/17 8404)      Persistent left pneumothorax, mildly increased in size  Left pleural fluid, moderately increased in amount  Workstation performed: [id number]         FL barium swallow video w speech   Final Result by ROCHELLE KELLEY (02/16 1154)      XR chest portable   Final Result by Jesus Ramirez MD (02/15 1037)      No change in moderate left pneumothorax  Left pleural effusion on recent CT not visible      Persistent extensive bilateral consolidation and groundglass opacity related to Covid 19 pneumonia/post infectious scar  Workstation performed: [id number]         XR chest portable   Final Result by Abimbola Riggs MD (02/15 7088)      Persistent but mildly diminished in size left-sided pneumothorax; no other significant change                  Workstation performed: [id number]         CTA head and neck w wo contrast   Final Result by Kristi Kirkpatrick MD (02/13 8175)      No large vessel flow restrictive disease within the head or neck  No evidence of high-grade proximal stenosis of the visualized [place]  No evidence of acute arterial dissection        Portions of the right transverse sinus are not well visualized, however, the left-sided transverse sinus is prominent and appears dominant  If there is clinical concern for acute pathology involving the right transverse sinus, MRV could be performed for    further evaluation, if there are no contraindications  There is subcutaneous emphysema in the left chest wall and tracking into the soft tissues of the posterior left neck  This is new compared with a CT of the chest performed February 13, 2021 at 2:57 AM              I personally discussed this study with Jose CASTRO on 2/13/2021 at 11:25 PM                      Workstation performed: [id number]         CT stroke alert brain   Final Result by Adrian Salgado MD (02/13 8924)      No acute intracranial hemorrhage  Questionable subtle hypodensity superior aspect left cerebral peduncle  MRI with diffusion-weighted imaging is recommended for further evaluation, if there are no contraindications  Findings were directly discussed with Mercedes CASTRO on 2/13/2021 10:40 PM       Workstation performed: [id number]         XR chest portable   Final Result by Juan Brothers MD (02/13 2150)      No significant change in moderate left pneumothorax  Left basilar pleural pigtail catheter is present  There is now subcutaneous emphysema in the left lateral chest wall  Clinical correlation and follow-up is recommended  Stable extensive bilateral groundglass opacity  I personally discussed this study with Jose CASTRO on 2/13/2021 at 9:49 PM                             Workstation performed: [id number]         CT chest wo contrast   Final Result by Adrian Salgado MD (02/13 0802)      There is a moderate-sized left hydropneumothorax which appears at least partially loculated  This appears similar in size to the February 12, 2021 plain film examination  A smallbore pigtail chest tube catheter is in place in the posterolateral lower    left hemithorax pleural space; the metallic marker is located just lateral to the rib margin  Extensive areas of dense consolidation are present bilaterally within the lungs  This appears significantly worse compared with January 12, 2021  The blood in the cardiac chambers appears somewhat hypodense relative to the ventricular walls  This suggests anemia  Correlation with hemoglobin/hematocrit levels is recommended  Cholelithiasis  The images are available for clinical review  Workstation performed: [id number]         XR chest portable   Final Result by George Del Toro MD (02/12 1143)      Slight increase in moderate left pneumothorax  Workstation performed: [id number]         XR chest portable   Final Result by George Del Toro MD (02/11 1628)      No change in moderate left pneumothorax  Question development of pneumomediastinum  The study was marked in EPIC for significant notification  Workstation performed: [id number]         XR chest portable   Final Result by George Del Toro MD (02/11 3896)      Left pigtail catheter with minimal decrease in moderate left pneumothorax  Persistent extensive bilateral groundglass opacity, likely post infectious scar from Covid 19 pneumonia  Workstation performed: [id number]         XR chest portable   Final Result by George Del Toro MD (02/11 1120)      Left pigtail insertion with drainage of the left effusion  Increase in size of left pneumothorax, now moderate  Persistent extensive bilateral groundglass opacity as a result of Covid-19 pneumonia,  Likely post infectious scar  Workstation performed: [id number]         IR chest tube placement   Final Result by Sofi Singh MD (02/11 3177)   Impression:   1  Successful placement of a therapeutic and [place] all-purpose drainage catheter into the left pleural space under ultrasound guidance                    Workstation performed: [id number]         XR chest portable   Final Result by George Del Toro MD (02/10 0562)      Moderate new left hydropneumothorax  Persistent extensive bilateral groundglass opacity, likely related to Covid 19  I personally discussed this study with Kath Josedacia on 2/10/2021 at 9:17 AM                            Workstation performed: [id number]         CT head wo contrast   Final Result by Christiana Zaidi MD (02/08 1802)      No acute intracranial abnormality  Stable chronic encephalomalacia on the right  Workstation performed: [id number]         FL barium swallow video w speech   Final Result by ROCHELLE KELLEY (02/02 1117)      XR chest portable   Final Result by Clint Guzman MD (02/01 0877)   1  Persistent bilateral airspace opacities suspicious for multifocal pneumonia  2   Enteric tube is not visualized (in this patient with history of check NG tube placement)  The study was marked in EPIC for significant notification  Workstation performed: [id number]         XR abdomen 1 view kub   Final Result by Clint Guzman MD (02/01 0557)   1  Nonobstructive bowel gas pattern  NG tube is not visualized (given history of check NG tube placement)  2   Patchy bilateral opacities are again noted at the bilateral lung bases  Workstation performed: [id number]         XR chest portable ICU   Final Result by Nivia Carlisle MD (01/22 2687)      Right jugular catheter at cavoatrial junction with no pneumothorax      No change in extensive bilateral groundglass opacity due to Covid 19 pneumonia  Workstation performed: [id number]         XR chest portable ICU   Final Result by Nivia Carlisle MD (01/20 5739)      Worsening bilateral groundglass opacity due to Covid 19                  Workstation performed: [id number]         VAS upper limb venous duplex scan, complete, bilateral   Final Result by Anuradha Flores MD (01/17 6847)      XR chest portable ICU   Final Result by Adelso Dewitt MD (01/15 2091) 1   Stable opacities   2  Stable lines and tubes                  Workstation performed: [id number]         IR lower extremity angiogram   Final Result by Patria Hammans (01/22 1437)      XR chest portable   Final Result by Clive Rodrigez MD (01/13 1821)         1  Interim slight improvement in bilateral interstitial and alveolar groundglass opacification is noted consistent with Covid 19 pneumonia with possible interim improvement of superimposed edema  2   Lines and tubes as noted  Workstation performed: [id number]         XR chest pa & lateral    (Results Pending)       Recent Cultures (last 7 days):     Results from last 7 days   Lab Units 02/14/21  0218 02/14/21  0217   BLOOD CULTURE  No Growth After 5 Days  No Growth After 5 Days         Last 24 Hours Medication List:   Current Facility-Administered Medications   Medication Dose Route Frequency Provider Last Rate    acetaminophen  650 mg Rectal Q6H PRN Naila Leos MD      acetaminophen  650 mg Oral Q6H PRN VERONICA Kennedy      albumin human  25 g Intravenous Once Naila Leos MD Stopped (02/15/21 9632)    albuterol  2 puff Inhalation Q4H PRN Bill Richardson, DO      bisacodyl  10 mg Rectal Daily PRN VERONICA Henley      cholecalciferol  2,000 Units Oral Daily VERONICA Kennedy      Diclofenac Sodium  4 g Topical 4x Daily VERONICA Kennedy      enoxaparin  1 mg/kg Subcutaneous Q12H [facility] Naila Leos MD      furosemide  40 mg Oral Daily Bill Richardson,       glycopyrrolate  1 mg Oral TID VERONICA Kennedy      insulin glargine  4 Units Subcutaneous HS Lindsey Castorena MD      insulin lispro  1-5 Units Subcutaneous TID AC Torres Taylor MD      Lidocaine Viscous HCl  15 mL Swish & Spit 4x Daily PRN VERONICA Kennedy      lisinopril  10 mg Oral Daily VERONICA Kennedy      menthol-methyl salicylate   Apply externally 4x Daily PRN VERONICA Henley  methadone  70 mg Oral Daily Zackary Evangelista MD      micafungin  100 mg Intravenous Q24H Zackary Evangelista  mg (02/19/21 1713)    SAMREEN ANTIFUNGAL  1 application Topical TID Sonia McdonaldaDO      multivitamin-minerals  1 tablet Oral Daily Shannan A Sedora, CRNP      omeprazole (PRILOSEC) suspension 2 mg/mL  20 mg Oral Early Morning Shannannoris Hopkins, CRNP      ondansetron  4 mg Intravenous Q6H PRN Toby Beyer PA-C      phenol  1 spray Mouth/Throat Q2H PRN Shannan A Rayoora, CRNP      polyethylene glycol  17 g Oral Daily Shannan A Rayoora, CRNP      potassium chloride  40 mEq Oral BID Zackary Evangelista MD      senna  1 tablet Oral BID Shannan A Sandeep, CRNP      sodium chloride  1 spray Each Nare Q1H PRN Virgen Pacheco DO      sodium chloride  1,000 mL Intravenous Once Matthias Connor MD Stopped (02/15/21 1715)        Today, Patient Was Seen By: Zackary Evangelista MD    ** Please Note: Dictation voice to text software may have been used in the creation of this document   **

## 2021-02-20 NOTE — ASSESSMENT & PLAN NOTE
· +2 pitting edema also with significant weakness/paresis   · S/p Venous duplex on 01/26 negative for acute or chronic DVT   · Resumed back on lasix with some improvement  · X-ray right humerus negative

## 2021-02-20 NOTE — ASSESSMENT & PLAN NOTE
Lab Results   Component Value Date    HGBA1C 11 6 (H) 12/09/2020       Recent Labs     02/19/21  1608 02/19/21  2046 02/20/21  0635 02/20/21  1052   POCGLU 107 124 114 178*       Blood Sugar Average: Last 72 hrs:  (P) 96 05     · BBG on the lower side, patient does not eating much due to dysphagia  · Continue Lantus 10 units + SSI  · Discontinue 3 units Humalog t i d  With meals

## 2021-02-20 NOTE — ASSESSMENT & PLAN NOTE
· Suspected embolic from aortic thrombus  · 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  · Continue therapeutic Lovenox  ; Eliquis scripts sent to patient's pharmacy for price check  · Currently stable from a vascular surgery standpoint

## 2021-02-20 NOTE — PROGRESS NOTES
Progress Note - Infectious Disease   Mallory Quevedo 56 y o  male MRN: [id number]  Unit/Bed#: [facility] 726-01 Encounter: [id number]      Impression/Plan:  1  Left-sided candidal empyema-unclear primary process   No clear evidence of an esophageal perforation at least clinically and radiographically   Consideration for the possibility of a pneumonia with colonization of the airways with Candida and a spontaneous pneumothorax contaminating the pleural space   Consideration for the possibility of a transient fungemia which is now resolved   Fortunately the patient remains hemodynamically stable   He has undergone chest tube drainage and is receiving tPA installations   He has clinically improved   Tolerating oral antifungals without difficulty   EKG with prolonged QT in the setting of methadone and fluconazole   Patient has now had chest tube removed  -continue micafungin through 3/12/2022 to complete 4 weeks total treatment  -no additional fluconazole  -may eventually need decortication  -recheck CBC with diff and CMP  -pulmonary follow-up     2  Fever-suspect all secondary to the fungal empyema   The blood cultures remain negative and the patient remains hemodynamically stable   He seems to be tolerating the antifungals without difficulty   No recurrence    Blood cultures were negative   -antifungals as above  -no additional ID workup for now     3  COVID 19 pneumonia-status post the initiation of the severe treatment protocol   Patient has now completed that treatment  [facility] did not receive any antivirals such as remdesivir or plasma due to the late phase of illness in which treatment was initiated   Is much improved clinically  -no additional COVID-19 directed treatment  -monitor respiratory status     4  Acute hypoxic respiratory failure-likely multifactorial including COVID-19 pneumonia, and now hydropneumothorax in the setting of presumptive candidal empyema   The patient is still requiring oxygen by nasal cannula intermittently  -O2 support  -wean off oxygen as able  -no additional ID workup for now     5  Acute right lower extremity ischemia-with suspected rhabdomyolysis   The patient is now status 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  Krishna Max has been continuing on anticoagulation  No local wound cellulitis noted  -no additional directed antibiotics  -vascular follow-up  -local wound care     6  Diabetes mellitus-type 2 with long-term insulin use and hyperglycemia    Discussed the above management plan with the primary service    Will continue to follow the patient intermittently     Antibiotics:  Micafungin restart 4  Antifungals 8    Subjective:  Patient has no fever, chills, sweats; no nausea, vomiting, diarrhea; no cough, shortness of breath; no pain  No new symptoms  Feeling a bit better today  Objective:  Vitals:  Temp:  [98 1 °F (36 7 °C)-98 6 °F (37 °C)] 98 1 °F (36 7 °C)  HR:  [75-87] 75  Resp:  [18] 18  BP: (126-145)/(70-75) 126/70  SpO2:  [94 %-95 %] 95 %  Temp (24hrs), Av 4 °F (36 9 °C), Min:98 1 °F (36 7 °C), Max:98 6 °F (37 °C)  Current: Temperature: 98 1 °F (36 7 °C)    Physical Exam:   General Appearance:  Alert, interactive, nontoxic, no acute distress  Throat: Oropharynx moist without lesions  Lungs:   Decreased breath sounds bilaterally; no wheezes, rhonchi or rales; respirations unlabored   Heart:  RRR; no murmur, rub or gallop   Abdomen:   Soft, non-tender, non-distended, positive bowel sounds  Extremities: No clubbing, cyanosis  Right lower extremity status post AKA with a clean wound site that is well approximated  Skin: No new rashes or lesions  No draining wounds noted         Labs, Imaging, & Other studies:   All pertinent labs and imaging studies were personally reviewed  Results from last 7 days   Lab Units 21  0402 21  2031 21  0951 21  0605  21  0532   WBC Thousand/uL 10 20*  --   --  8 87  --  7 21   HEMOGLOBIN g/dL 8 0* 8 3* 8 2* 8 7*   < > 7 7*   PLATELETS Thousands/uL 389  --   --  387  --  261    < > = values in this interval not displayed  Results from last 7 days   Lab Units 02/20/21  0402 02/19/21  0605 02/18/21  0644 02/16/21  0532   SODIUM mmol/L 141 143 138 137   POTASSIUM mmol/L 3 3* 3 2* 3 2* 3 6   CHLORIDE mmol/L 105 105 99* 101   CO2 mmol/L 31 33* 35* 32   BUN mg/dL 7 7 9 16   CREATININE mg/dL 0 42* 0 43* 0 35* 0 46*   EGFR ml/min/1 73sq m 125 124 134 120   CALCIUM mg/dL 7 7* 7 4* 8 3 7 7*   AST U/L  --   --   --  13   ALT U/L  --   --   --  10*   ALK PHOS U/L  --   --   --  80     Results from last 7 days   Lab Units 02/14/21  0218 02/14/21  0217   BLOOD CULTURE  No Growth After 5 Days  No Growth After 5 Days       Results from last 7 days   Lab Units 02/13/21 2036   PROCALCITONIN ng/ml 0 30*

## 2021-02-21 LAB
ANION GAP SERPL CALCULATED.3IONS-SCNC: 6 MMOL/L (ref 4–13)
ATRIAL RATE: 75 BPM
BUN SERPL-MCNC: 9 MG/DL (ref 5–25)
CALCIUM SERPL-MCNC: 8 MG/DL (ref 8.3–10.1)
CHLORIDE SERPL-SCNC: 108 MMOL/L (ref 100–108)
CO2 SERPL-SCNC: 29 MMOL/L (ref 21–32)
CREAT SERPL-MCNC: 0.5 MG/DL (ref 0.6–1.3)
GFR SERPL CREATININE-BSD FRML MDRD: 116 ML/MIN/1.73SQ M
GLUCOSE SERPL-MCNC: 124 MG/DL (ref 65–140)
GLUCOSE SERPL-MCNC: 136 MG/DL (ref 65–140)
GLUCOSE SERPL-MCNC: 157 MG/DL (ref 65–140)
GLUCOSE SERPL-MCNC: 197 MG/DL (ref 65–140)
GLUCOSE SERPL-MCNC: 201 MG/DL (ref 65–140)
HCT VFR BLD AUTO: 28.5 % (ref 36.5–49.3)
HCT VFR BLD AUTO: 28.6 % (ref 36.5–49.3)
HGB BLD-MCNC: 8.5 G/DL (ref 12–17)
HGB BLD-MCNC: 8.5 G/DL (ref 12–17)
MAGNESIUM SERPL-MCNC: 1.8 MG/DL (ref 1.6–2.6)
P AXIS: 39 DEGREES
POTASSIUM SERPL-SCNC: 3.8 MMOL/L (ref 3.5–5.3)
PR INTERVAL: 124 MS
QRS AXIS: 48 DEGREES
QRSD INTERVAL: 76 MS
QT INTERVAL: 440 MS
QTC INTERVAL: 491 MS
SODIUM SERPL-SCNC: 143 MMOL/L (ref 136–145)
T WAVE AXIS: 73 DEGREES
VENTRICULAR RATE: 75 BPM

## 2021-02-21 PROCEDURE — 85014 HEMATOCRIT: CPT | Performed by: INTERNAL MEDICINE

## 2021-02-21 PROCEDURE — 80048 BASIC METABOLIC PNL TOTAL CA: CPT | Performed by: INTERNAL MEDICINE

## 2021-02-21 PROCEDURE — 93005 ELECTROCARDIOGRAM TRACING: CPT

## 2021-02-21 PROCEDURE — 93010 ELECTROCARDIOGRAM REPORT: CPT | Performed by: INTERNAL MEDICINE

## 2021-02-21 PROCEDURE — 82948 REAGENT STRIP/BLOOD GLUCOSE: CPT

## 2021-02-21 PROCEDURE — 85018 HEMOGLOBIN: CPT | Performed by: INTERNAL MEDICINE

## 2021-02-21 PROCEDURE — 99232 SBSQ HOSP IP/OBS MODERATE 35: CPT | Performed by: INTERNAL MEDICINE

## 2021-02-21 PROCEDURE — 83735 ASSAY OF MAGNESIUM: CPT | Performed by: INTERNAL MEDICINE

## 2021-02-21 RX ADMIN — MICONAZOLE NITRATE 1 APPLICATION: 20 CREAM TOPICAL at 16:00

## 2021-02-21 RX ADMIN — ACETAMINOPHEN 650 MG: 325 TABLET, FILM COATED ORAL at 11:05

## 2021-02-21 RX ADMIN — ACETAMINOPHEN 650 MG: 325 TABLET, FILM COATED ORAL at 04:59

## 2021-02-21 RX ADMIN — ACETAMINOPHEN 650 MG: 325 TABLET, FILM COATED ORAL at 21:01

## 2021-02-21 RX ADMIN — MAGNESIUM OXIDE TAB 400 MG (241.3 MG ELEMENTAL MG) 400 MG: 400 (241.3 MG) TAB at 09:30

## 2021-02-21 RX ADMIN — Medication 1 TABLET: at 09:27

## 2021-02-21 RX ADMIN — LISINOPRIL 10 MG: 10 TABLET ORAL at 09:32

## 2021-02-21 RX ADMIN — INSULIN LISPRO 1 UNITS: 100 INJECTION, SOLUTION INTRAVENOUS; SUBCUTANEOUS at 11:04

## 2021-02-21 RX ADMIN — INSULIN LISPRO 1 UNITS: 100 INJECTION, SOLUTION INTRAVENOUS; SUBCUTANEOUS at 17:39

## 2021-02-21 RX ADMIN — Medication 20 MG: at 06:00

## 2021-02-21 RX ADMIN — GLYCOPYRROLATE 1 MG: 1 TABLET ORAL at 21:02

## 2021-02-21 RX ADMIN — FUROSEMIDE 40 MG: 40 TABLET ORAL at 09:31

## 2021-02-21 RX ADMIN — MICONAZOLE NITRATE 1 APPLICATION: 20 CREAM TOPICAL at 09:28

## 2021-02-21 RX ADMIN — METHADONE HYDROCHLORIDE 70 MG: 10 TABLET ORAL at 09:30

## 2021-02-21 RX ADMIN — Medication 2000 UNITS: at 09:27

## 2021-02-21 RX ADMIN — MICAFUNGIN SODIUM 100 MG: 50 INJECTION, POWDER, LYOPHILIZED, FOR SOLUTION INTRAVENOUS at 17:44

## 2021-02-21 RX ADMIN — GLYCOPYRROLATE 1 MG: 1 TABLET ORAL at 09:29

## 2021-02-21 RX ADMIN — ENOXAPARIN SODIUM 70 MG: 80 INJECTION SUBCUTANEOUS at 21:00

## 2021-02-21 RX ADMIN — MICONAZOLE NITRATE 1 APPLICATION: 20 CREAM TOPICAL at 21:03

## 2021-02-21 RX ADMIN — INSULIN GLARGINE 4 UNITS: 100 INJECTION, SOLUTION SUBCUTANEOUS at 21:02

## 2021-02-21 RX ADMIN — ENOXAPARIN SODIUM 70 MG: 80 INJECTION SUBCUTANEOUS at 09:27

## 2021-02-21 RX ADMIN — GLYCOPYRROLATE 1 MG: 1 TABLET ORAL at 17:41

## 2021-02-21 NOTE — ASSESSMENT & PLAN NOTE
Lab Results   Component Value Date    HGBA1C 11 6 (H) 12/09/2020       Recent Labs     02/20/21  1052 02/20/21  1611 02/20/21 2128 02/21/21  0710   POCGLU 178* 109 209* 136       Blood Sugar Average: Last 72 hrs:  (P) 113 1875     · BBG on the lower side, patient does not eating much due to dysphagia  · Continue Lantus 10 units + SSI  · Discontinue 3 units Humalog t i d  With meals

## 2021-02-21 NOTE — PLAN OF CARE
Problem: Prexisting or High Potential for Compromised Skin Integrity  Goal: Skin integrity is maintained or improved  Description: INTERVENTIONS:  - Identify patients at risk for skin breakdown  - Assess and monitor skin integrity  - Assess and monitor nutrition and hydration status  - Monitor labs   - Assess for incontinence   - Turn and reposition patient  - Assist with mobility/ambulation  - Relieve pressure over bony prominences  - Avoid friction and shearing  - Provide appropriate hygiene as needed including keeping skin clean and dry  - Evaluate need for skin moisturizer/barrier cream  - Collaborate with interdisciplinary team   - Patient/family teaching  - Consider wound care consult   Outcome: Progressing     Problem: Potential for Falls  Goal: Patient will remain free of falls  Description: INTERVENTIONS:  - Assess patient frequently for physical needs  -  Identify cognitive and physical deficits and behaviors that affect risk of falls  -  [place] fall precautions as indicated by assessment   - Educate patient/family on patient safety including physical limitations  - Instruct patient to call for assistance with activity based on assessment  - Modify environment to reduce risk of injury  - Consider OT/PT consult to assist with strengthening/mobility  Outcome: Progressing     Problem: Nutrition/Hydration-ADULT  Goal: Nutrient/Hydration intake appropriate for improving, restoring or maintaining nutritional needs  Description: Monitor and assess patient's nutrition/hydration status for malnutrition  Collaborate with interdisciplinary team and initiate plan and interventions as ordered  Monitor patient's weight and dietary intake as ordered or per policy  Utilize nutrition screening tool and intervene as necessary  Determine patient's food preferences and provide high-protein, high-caloric foods as appropriate       INTERVENTIONS:  - Monitor oral intake, urinary output, labs, and treatment plans  - Assess nutrition and hydration status and recommend course of action  - Evaluate amount of meals eaten  - Assist patient with eating if necessary   - Allow adequate time for meals  - Recommend/ encourage appropriate diets, oral nutritional supplements, and vitamin/mineral supplements  - Order, calculate, and assess calorie counts as needed  - Assess need for intravenous fluids  - Provide specific nutrition/hydration education as appropriate  - Include patient/family/caregiver in decisions related to nutrition  Outcome: Progressing     Problem: PAIN - ADULT  Goal: Verbalizes/displays adequate comfort level or baseline comfort level  Description: Interventions:  - Encourage patient to monitor pain and request assistance  - Assess pain using appropriate pain scale  - Administer analgesics based on type and severity of pain and evaluate response  - Implement non-pharmacological measures as appropriate and evaluate response  - Consider cultural and social influences on pain and pain management  - Notify physician/advanced practitioner if interventions unsuccessful or patient reports new pain  Outcome: Progressing     Problem: INFECTION - ADULT  Goal: Absence or prevention of progression during hospitalization  Description: INTERVENTIONS:  - Assess and monitor for signs and symptoms of infection  - Monitor lab/diagnostic results  - Monitor all insertion sites, i e  indwelling lines, tubes, and drains  - [place] appropriate cooling/warming therapies per order  - Administer medications as ordered  - Instruct and encourage patient and family to use good hand hygiene technique  - Identify and instruct in appropriate isolation precautions for identified infection/condition  Outcome: Progressing     Problem: SAFETY ADULT  Goal: Patient will remain free of falls  Description: INTERVENTIONS:  - Assess patient frequently for physical needs  -  Identify cognitive and physical deficits and behaviors that affect risk of falls    - [place] fall precautions as indicated by assessment   - Educate patient/family on patient safety including physical limitations  - Instruct patient to call for assistance with activity based on assessment  - Modify environment to reduce risk of injury  - Consider OT/PT consult to assist with strengthening/mobility  Outcome: Progressing  Goal: Maintain or return mobility status to optimal level  Description: INTERVENTIONS:  - Assess patient's baseline mobility status (ambulation, transfers, stairs, etc )    - Identify cognitive and physical deficits and behaviors that affect mobility  - Identify mobility aids required to assist with transfers and/or ambulation (gait belt, sit-to-stand, lift, walker, cane, etc )  - [place] fall precautions as indicated by assessment  - Record patient progress and toleration of activity level on Mobility SBAR; progress patient to next Phase/Stage  - Instruct patient to call for assistance with activity based on assessment  - Consider rehabilitation consult to assist with strengthening/weightbearing, etc   Outcome: Progressing     Problem: DISCHARGE PLANNING  Goal: Discharge to home or other facility with appropriate resources  Description: INTERVENTIONS:  - Identify barriers to discharge w/patient and caregiver  - Arrange for needed discharge resources and transportation as appropriate  - Identify discharge learning needs (meds, wound care, etc )  - Arrange for interpretive services to assist at discharge as needed  - Refer to Case Management Department for coordinating discharge planning if the patient needs post-hospital services based on physician/advanced practitioner order or complex needs related to functional status, cognitive ability, or social support system  Outcome: Progressing     Problem: Knowledge Deficit  Goal: Patient/family/caregiver demonstrates understanding of disease process, treatment plan, medications, and discharge instructions  Description: Complete learning assessment and assess knowledge base    Interventions:  - Provide teaching at level of understanding  - Provide teaching via preferred learning methods  Outcome: Progressing

## 2021-02-21 NOTE — WOUND OSTOMY CARE
Wound care re-consulted by nursing for sacral wound, wound care has been following patient since admittion to [facility], patient was with DTI to sacrum which since has evolved and presenting more liken an unstageable wound  Patient wound last seen Wednesdays and orders updated  Primary RN made aware wound is already following patient, nursing to continue following current orders, we will re-evaluate patient on schedule follow up day of Wednesday  Please tiger text with questions and concerns        Ruebn Priest RN, BSN, [facility]

## 2021-02-21 NOTE — PROGRESS NOTES
Progress Note Nikhil Juarez 1960, 56 y o  male MRN: [id number]    Unit/Bed#: [facility] 726-01 Encounter: [id number]    Primary Care Provider: VERONICA Caputo   Date and time admitted to hospital: 1/13/2021  2:28 PM        * Left Hydropneumothorax  Assessment & Plan  Candida empyema  Status post pigtail  On 2/17/21  Now on room air  Pulmonology and thoracic surgery following  ID on board - fluconazole switched to micafungin due to prolonged QTC along with methadone use  PICC line consent obtained and will need PICC line for IV micafungin until 03/12/2021  Follow-up in the thoracic office 1 month after the discharge from the hospital with chest x-ray PA and lateral results    Ischemia of right lower extremity with suspected rhabdomyolysis  Assessment & Plan  · Suspected embolic from aortic thrombus  · 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  · Continue therapeutic Lovenox  ; Eliquis scripts sent to patient's pharmacy for price check  · Currently stable from a vascular surgery standpoint    Prolonged Q-T interval on ECG  Assessment & Plan  -the patient has prolonged QTC of 487, patient is on methadone and fluconazole  -Patient is on methadone for years  -Patient fluconazole was switched to micafungin per y ID    Right femoral vein DVT (HCC)  Assessment & Plan  Continue therapeutic Lovenox  Eliquis script sent to patient's primary pharmacy for price check  Edema of right upper extremity  Assessment & Plan  · +2 pitting edema also with significant weakness/paresis   · S/p Venous duplex on 01/26 negative for acute or chronic DVT   · Resumed back on lasix with some improvement  · X-ray right humerus negative  Oropharyngeal dysphagia  Assessment & Plan  · Extubated 1/25 and required NGT for nutrition   · Speech therapy following   · S/p barium swallow - no evidence of leakage  · Continue modified diet  · Continue robinul  · S/p ENT consult, no vocal cord motion impairment    · Status post video barium swallow on 02/16     Muscle weakness of right upper extremity  Assessment & Plan  · Profound weakness, predominantly in right upper extremity, especially due to critical illness and steroid use  · During hospitalization, stroke alert was called because of concern for worsening right upper extremity weakness  CT head showed questionable hypodensity but no evidence of acute stroke or bleeding  · Patient currently on therapeutic Lovenox  · Strength currently 2 to 3/5 at baseline  · Evaluated by Neurology  No further inpatient workup required  · Right upper extremity x-ray per family request showed no acute intraosseous abnormality  Anemia  Assessment & Plan  · Secondary to critical illness, no sign of acute blood loss  · Neg stool occult  · Hemoglobin stable around his baseline  · Continue to monitor and transfuse if < 7    Aortic thrombus (HCC)  Assessment & Plan  · With embolic event that compromised right femoral artery  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  S/p R AKA formalization 1/18   · Continue therapeutic lovenox  · F/u with vascular surgery as outpatient    Acute respiratory failure with hypoxia ([address] )  Assessment & Plan  · Secondary to COVID 19  · Extubated 1/25  · Now resolved and on room air  Type 2 diabetes mellitus, with long-term current use of insulin [facility])  Assessment & Plan  Lab Results   Component Value Date    HGBA1C 11 6 (H) 12/09/2020       Recent Labs     02/20/21  1052 02/20/21  1611 02/20/21  2128 02/21/21  0710   POCGLU 178* 109 209* 136       Blood Sugar Average: Last 72 hrs:  (P) 113 1875     · BBG on the lower side, patient does not eating much due to dysphagia  · Continue Lantus 10 units + SSI  · Discontinue 3 units Humalog t i d  With meals  BMI 28 0-28 9,adult  Assessment & Plan        Methadone maintenance therapy patient [facility])  Assessment & Plan  Chronic methadone use  Methadone was held yesterday due to lethargy yesterday    Restarted methadone today as patient requested due to significant limb pain  And he is on chronic methadone therapy  Will continue to hold gabapentin for now to avoid over sedation and lethargy  Fluconazole discontinued due to prolonged QTC with concomitant methadone fluconazole use  HTN (hypertension)  Assessment & Plan  · Stable  · Continue lisinopril, lasix          VTE Pharmacologic Prophylaxis:   Pharmacologic: Enoxaparin (Lovenox)  Mechanical VTE Prophylaxis in Place: Yes    Patient Centered Rounds: I have performed bedside rounds with nursing staff today  Discussions with Specialists or Other Care Team Provider: OFELIA    Education and Discussions with Family / Patient:  Discussed plan of care with the patient  Time Spent for Care: 30 minutes  More than 50% of total time spent on counseling and coordination of care as described above  Current Length of Stay: 39 day(s)    Current Patient Status: Inpatient   Certification Statement: The patient will continue to require additional inpatient hospital stay due to Pending placement    Discharge Plan:  Pending placement    Code Status: Level 1 - Full Code      Subjective:   No overnight events reported  No new complaints  Patient reports that he is comfortable  Objective:     Vitals:   Temp (24hrs), Av 1 °F (36 7 °C), Min:97 8 °F (36 6 °C), Max:98 4 °F (36 9 °C)    Temp:  [97 8 °F (36 6 °C)-98 4 °F (36 9 °C)] 97 8 °F (36 6 °C)  HR:  [78-81] 78  Resp:  [16-20] 17  BP: (134-138)/(83-86) 134/83  SpO2:  [93 %] 93 %  Body mass index is 23 66 kg/m²  Input and Output Summary (last 24 hours): Intake/Output Summary (Last 24 hours) at 2021 1229  Last data filed at 2021 0001  Gross per 24 hour   Intake 200 ml   Output 850 ml   Net -650 ml       Physical Exam:     Physical Exam  Constitutional: Megha Couch is not in acute distress  Cardiovascular: Rate and Rhythm: Normal rate and regular rhythm  Heart sounds: Normal heart sounds  No murmur  Pulmonary: Effort: No respiratory distress   Breath sounds: No wheezing or rales  Coarse breath sounds  Chest tube noted  Abdominal: Bowel sounds are normal  There is no distension  soft  here is no abdominal tenderness  Musculoskeletal:   Right AKA noted   Neurological:      Mental Status: He is alert       Comments: Awake alert and communicative     Additional Data:     Labs:    Results from last 7 days   Lab Units 02/21/21  1100  02/20/21  0402   WBC Thousand/uL  --   --  10 20*   HEMOGLOBIN g/dL 8 5*   < > 8 0*   HEMATOCRIT % 28 6*   < > 27 1*   PLATELETS Thousands/uL  --   --  389   NEUTROS PCT %  --   --  78*   LYMPHS PCT %  --   --  11*   MONOS PCT %  --   --  8   EOS PCT %  --   --  1    < > = values in this interval not displayed  Results from last 7 days   Lab Units 02/21/21  0455  02/16/21  0532   SODIUM mmol/L 143   < > 137   POTASSIUM mmol/L 3 8   < > 3 6   CHLORIDE mmol/L 108   < > 101   CO2 mmol/L 29   < > 32   BUN mg/dL 9   < > 16   CREATININE mg/dL 0 50*   < > 0 46*   ANION GAP mmol/L 6   < > 4   CALCIUM mg/dL 8 0*   < > 7 7*   ALBUMIN g/dL  --   --  1 2*   TOTAL BILIRUBIN mg/dL  --   --  0 73   ALK PHOS U/L  --   --  80   ALT U/L  --   --  10*   AST U/L  --   --  13   GLUCOSE RANDOM mg/dL 124   < > 121    < > = values in this interval not displayed  Results from last 7 days   Lab Units 02/21/21  0710 02/20/21 2128 02/20/21  1611 02/20/21  1052 02/20/21  0635 02/19/21  2046 02/19/21  1608 02/19/21  1047 02/19/21  0748 02/18/21  2239 02/18/21  2127 02/18/21  1659   POC GLUCOSE mg/dl 136 209* 109 178* 114 124 107 127 115 118 83 78                   * I Have Reviewed All Lab Data Listed Above  * Additional Pertinent Lab Tests Reviewed: All Labs Within Last 24 Hours Reviewed    Imaging:    XR humerus right   Final Result by Mera Kim MD (02/18 1521)      No acute osseous abnormality  Workstation performed: [id number]         FL barium swallow   Final Result by Anusha Bunch MD (02/18 1107)      No leak  Workstation performed: [id number]         XR chest pa & lateral   Final Result by John Baltazar MD (02/18 0820)      Left hydropneumothorax, unchanged after removal of pigtail left chest drainage catheter  Workstation performed: [id number]         XR chest portable   Final Result by Vinod Wen MD (02/17 7496)      Persistent left pneumothorax, mildly increased in size  Left pleural fluid, moderately increased in amount  Workstation performed: [id number]         FL barium swallow video w speech   Final Result by ROCHELLE KELLEY (02/16 1154)      XR chest portable   Final Result by Jp Diaz MD (02/15 1037)      No change in moderate left pneumothorax  Left pleural effusion on recent CT not visible      Persistent extensive bilateral consolidation and groundglass opacity related to Covid 19 pneumonia/post infectious scar  Workstation performed: [id number]         XR chest portable   Final Result by Vinod Wen MD (02/15 4752)      Persistent but mildly diminished in size left-sided pneumothorax; no other significant change                  Workstation performed: [id number]         CTA head and neck w wo contrast   Final Result by Estefani Castañeda MD (02/13 9832)      No large vessel flow restrictive disease within the head or neck  No evidence of high-grade proximal stenosis of the visualized [place]  No evidence of acute arterial dissection  Portions of the right transverse sinus are not well visualized, however, the left-sided transverse sinus is prominent and appears dominant  If there is clinical concern for acute pathology involving the right transverse sinus, MRV could be performed for    further evaluation, if there are no contraindications  There is subcutaneous emphysema in the left chest wall and tracking into the soft tissues of the posterior left neck    This is new compared with a CT of the chest performed February 13, 2021 at 2:57 AM              I personally discussed this study with Rosa CASTRO on 2/13/2021 at 11:25 PM                      Workstation performed: [id number]         CT stroke alert brain   Final Result by Harrison Bryant MD (02/13 2625)      No acute intracranial hemorrhage  Questionable subtle hypodensity superior aspect left cerebral peduncle  MRI with diffusion-weighted imaging is recommended for further evaluation, if there are no contraindications  Findings were directly discussed with Rosa Scarlett CASTRO on 2/13/2021 10:40 PM       Workstation performed: [id number]         XR chest portable   Final Result by Nakia Dimas MD (02/13 2150)      No significant change in moderate left pneumothorax  Left basilar pleural pigtail catheter is present  There is now subcutaneous emphysema in the left lateral chest wall  Clinical correlation and follow-up is recommended  Stable extensive bilateral groundglass opacity  I personally discussed this study with Chago CASTRO on 2/13/2021 at 9:49 PM                             Workstation performed: [id number]         CT chest wo contrast   Final Result by Harrison Bryant MD (02/13 0802)      There is a moderate-sized left hydropneumothorax which appears at least partially loculated  This appears similar in size to the February 12, 2021 plain film examination  A smallbore pigtail chest tube catheter is in place in the posterolateral lower    left hemithorax pleural space; the metallic marker is located just lateral to the rib margin  Extensive areas of dense consolidation are present bilaterally within the lungs  This appears significantly worse compared with January 12, 2021  The blood in the cardiac chambers appears somewhat hypodense relative to the ventricular walls  This suggests anemia  Correlation with hemoglobin/hematocrit levels is recommended  Cholelithiasis        The images are available for clinical review  Workstation performed: [id number]         XR chest portable   Final Result by Mili Castaneda MD (02/12 1143)      Slight increase in moderate left pneumothorax  Workstation performed: [id number]         XR chest portable   Final Result by Mili Castaneda MD (02/11 1628)      No change in moderate left pneumothorax  Question development of pneumomediastinum  The study was marked in EPIC for significant notification  Workstation performed: [id number]         XR chest portable   Final Result by Mili Castaneda MD (02/11 4105)      Left pigtail catheter with minimal decrease in moderate left pneumothorax  Persistent extensive bilateral groundglass opacity, likely post infectious scar from Covid 19 pneumonia  Workstation performed: [id number]         XR chest portable   Final Result by Mili Castaneda MD (02/11 1120)      Left pigtail insertion with drainage of the left effusion  Increase in size of left pneumothorax, now moderate  Persistent extensive bilateral groundglass opacity as a result of Covid-19 pneumonia,  Likely post infectious scar  Workstation performed: [id number]         IR chest tube placement   Final Result by Darlyn Edmond MD (02/11 8754)   Impression:   1  Successful placement of a therapeutic and [place] all-purpose drainage catheter into the left pleural space under ultrasound guidance  Workstation performed: [id number]         XR chest portable   Final Result by Mili Castaneda MD (02/10 3205)      Moderate new left hydropneumothorax  Persistent extensive bilateral groundglass opacity, likely related to Covid 19          I personally discussed this study with Asya Godfrey on 2/10/2021 at 9:17 AM                            Workstation performed: [id number]         CT head wo contrast   Final Result by Armond Amezquita MD (02/08 8190) No acute intracranial abnormality  Stable chronic encephalomalacia on the right  Workstation performed: [id number]         FL barium swallow video w speech   Final Result by ROCHELLE KELLEY (02/02 1117)      XR chest portable   Final Result by Yumi Burton MD (02/01 3189)   1  Persistent bilateral airspace opacities suspicious for multifocal pneumonia  2   Enteric tube is not visualized (in this patient with history of check NG tube placement)  The study was marked in EPIC for significant notification  Workstation performed: [id number]         XR abdomen 1 view kub   Final Result by Yumi Burton MD (02/01 9854)   1  Nonobstructive bowel gas pattern  NG tube is not visualized (given history of check NG tube placement)  2   Patchy bilateral opacities are again noted at the bilateral lung bases  Workstation performed: [id number]         XR chest portable ICU   Final Result by Val Lane MD (01/22 8211)      Right jugular catheter at cavoatrial junction with no pneumothorax      No change in extensive bilateral groundglass opacity due to Covid 19 pneumonia  Workstation performed: [id number]         XR chest portable ICU   Final Result by Val Lane MD (01/20 1111)      Worsening bilateral groundglass opacity due to Covid 19  Workstation performed: [id number]         VAS upper limb venous duplex scan, complete, bilateral   Final Result by Michael Hutson MD (01/17 0040)      XR chest portable ICU   Final Result by Sherryle Rote, MD (01/15 1539)      1  Stable opacities   2  Stable lines and tubes                  Workstation performed: [id number]         IR lower extremity angiogram   Final Result by Jordy Young (01/22 1437)      XR chest portable   Final Result by Rubén Anderson MD (01/13 2531)         1    Interim slight improvement in bilateral interstitial and alveolar groundglass opacification is noted consistent with Covid 19 pneumonia with possible interim improvement of superimposed edema  2   Lines and tubes as noted                    Workstation performed: [id number]         XR chest pa & lateral    (Results Pending)     Recent Cultures (last 7 days):           Last 24 Hours Medication List:   Current Facility-Administered Medications   Medication Dose Route Frequency Provider Last Rate    acetaminophen  650 mg Rectal Q6H PRN Owen Olmos MD      acetaminophen  650 mg Oral Q6H PRN VERONICA Kennedy      albumin human  25 g Intravenous Once Owen Olmos MD Stopped (02/15/21 1715)    albuterol  2 puff Inhalation Q4H PRN Yfn Rodriguez DO      bisacodyl  10 mg Rectal Daily PRN Coral Dwight, CRNP      cholecalciferol  2,000 Units Oral Daily Shannan A Sandeep, VERONICA      Diclofenac Sodium  4 g Topical 4x Daily Shannan A Sandeep, VERONICA      enoxaparin  1 mg/kg Subcutaneous Q12H [facility] Owen Olmos MD      furosemide  40 mg Oral Daily Yfn Rodriguez DO      glycopyrrolate  1 mg Oral TID Shannan A Sandeep, VERONICA      insulin glargine  4 Units Subcutaneous HS Alfredito Hannah MD      insulin lispro  1-5 Units Subcutaneous TID AC Levi Aj MD      Lidocaine Viscous HCl  15 mL Swish & Spit 4x Daily PRN Shannan A Sandeep, VERONICA      lisinopril  10 mg Oral Daily Shannan A Sandeep, VERONICA      menthol-methyl salicylate   Apply externally 4x Daily PRN Coral Dwight, CRNP      methadone  70 mg Oral Daily Harper Sánchez MD      micafungin  100 mg Intravenous Q24H Harper Sánchez  mg (02/20/21 1714)   [place] SAMREEN ANTIFUNGAL  1 application Topical TID Hetul Mccall, DO      multivitamin-minerals  1 tablet Oral Daily Shannan A Sandeep, VERONICA      omeprazole (PRILOSEC) suspension 2 mg/mL  20 mg Oral Early Morning Shannan A Sandeep, VERONICA      ondansetron  4 mg Intravenous Q6H PRN Toby Beyer PA-C      phenol  1 spray Mouth/Throat Q2H PRN Coral Dwight, CRNP      polyethylene glycol  17 g Oral Daily Shannan Hopkins, CRNP      senna  1 tablet Oral BID Shannan Hopkins CRNP      sodium chloride  1 spray Each Nare Q1H PRN Jane Yost DO      sodium chloride  1,000 mL Intravenous Once Mitali Dimas MD Stopped (02/15/21 1035)        Today, Patient Was Seen By: Jesus Zapata MD    ** Please Note: Dictation voice to text software may have been used in the creation of this document   **

## 2021-02-22 LAB
ANION GAP SERPL CALCULATED.3IONS-SCNC: 3 MMOL/L (ref 4–13)
ATRIAL RATE: 79 BPM
BACTERIA SPEC BFLD CULT: ABNORMAL
BUN SERPL-MCNC: 13 MG/DL (ref 5–25)
CALCIUM SERPL-MCNC: 8.4 MG/DL (ref 8.3–10.1)
CHLORIDE SERPL-SCNC: 107 MMOL/L (ref 100–108)
CO2 SERPL-SCNC: 30 MMOL/L (ref 21–32)
CREAT SERPL-MCNC: 0.42 MG/DL (ref 0.6–1.3)
ERYTHROCYTE [DISTWIDTH] IN BLOOD BY AUTOMATED COUNT: 16.4 % (ref 11.6–15.1)
GFR SERPL CREATININE-BSD FRML MDRD: 125 ML/MIN/1.73SQ M
GLUCOSE SERPL-MCNC: 114 MG/DL (ref 65–140)
GLUCOSE SERPL-MCNC: 130 MG/DL (ref 65–140)
GLUCOSE SERPL-MCNC: 156 MG/DL (ref 65–140)
GLUCOSE SERPL-MCNC: 159 MG/DL (ref 65–140)
GLUCOSE SERPL-MCNC: 160 MG/DL (ref 65–140)
GRAM STN SPEC: ABNORMAL
GRAM STN SPEC: ABNORMAL
HCT VFR BLD AUTO: 29.9 % (ref 36.5–49.3)
HGB BLD-MCNC: 8.7 G/DL (ref 12–17)
MCH RBC QN AUTO: 27 PG (ref 26.8–34.3)
MCHC RBC AUTO-ENTMCNC: 29.1 G/DL (ref 31.4–37.4)
MCV RBC AUTO: 93 FL (ref 82–98)
MISCELLANEOUS LAB TEST RESULT: NORMAL
P AXIS: 62 DEGREES
PLATELET # BLD AUTO: 390 THOUSANDS/UL (ref 149–390)
PMV BLD AUTO: 10.1 FL (ref 8.9–12.7)
POTASSIUM SERPL-SCNC: 3.6 MMOL/L (ref 3.5–5.3)
PR INTERVAL: 136 MS
QRS AXIS: 21 DEGREES
QRSD INTERVAL: 68 MS
QT INTERVAL: 376 MS
QTC INTERVAL: 431 MS
RBC # BLD AUTO: 3.22 MILLION/UL (ref 3.88–5.62)
SODIUM SERPL-SCNC: 140 MMOL/L (ref 136–145)
T WAVE AXIS: 84 DEGREES
VENTRICULAR RATE: 79 BPM
WBC # BLD AUTO: 10.14 THOUSAND/UL (ref 4.31–10.16)

## 2021-02-22 PROCEDURE — 99232 SBSQ HOSP IP/OBS MODERATE 35: CPT | Performed by: INTERNAL MEDICINE

## 2021-02-22 PROCEDURE — 93005 ELECTROCARDIOGRAM TRACING: CPT

## 2021-02-22 PROCEDURE — 97530 THERAPEUTIC ACTIVITIES: CPT

## 2021-02-22 PROCEDURE — 97110 THERAPEUTIC EXERCISES: CPT

## 2021-02-22 PROCEDURE — 85027 COMPLETE CBC AUTOMATED: CPT | Performed by: INTERNAL MEDICINE

## 2021-02-22 PROCEDURE — 93010 ELECTROCARDIOGRAM REPORT: CPT | Performed by: INTERNAL MEDICINE

## 2021-02-22 PROCEDURE — 80048 BASIC METABOLIC PNL TOTAL CA: CPT | Performed by: INTERNAL MEDICINE

## 2021-02-22 PROCEDURE — 82948 REAGENT STRIP/BLOOD GLUCOSE: CPT

## 2021-02-22 PROCEDURE — 99233 SBSQ HOSP IP/OBS HIGH 50: CPT | Performed by: INTERNAL MEDICINE

## 2021-02-22 RX ORDER — ACETAMINOPHEN 325 MG/1
975 TABLET ORAL EVERY 8 HOURS SCHEDULED
Status: DISCONTINUED | OUTPATIENT
Start: 2021-02-22 | End: 2021-02-25 | Stop reason: HOSPADM

## 2021-02-22 RX ADMIN — GLYCOPYRROLATE 1 MG: 1 TABLET ORAL at 22:07

## 2021-02-22 RX ADMIN — ACETAMINOPHEN 650 MG: 325 TABLET, FILM COATED ORAL at 06:00

## 2021-02-22 RX ADMIN — GLYCOPYRROLATE 1 MG: 1 TABLET ORAL at 09:33

## 2021-02-22 RX ADMIN — MICAFUNGIN SODIUM 100 MG: 50 INJECTION, POWDER, LYOPHILIZED, FOR SOLUTION INTRAVENOUS at 16:58

## 2021-02-22 RX ADMIN — Medication 20 MG: at 06:00

## 2021-02-22 RX ADMIN — MICONAZOLE NITRATE 1 APPLICATION: 20 CREAM TOPICAL at 09:34

## 2021-02-22 RX ADMIN — FUROSEMIDE 40 MG: 40 TABLET ORAL at 09:35

## 2021-02-22 RX ADMIN — DICLOFENAC SODIUM 4 G: 10 GEL TOPICAL at 11:33

## 2021-02-22 RX ADMIN — GLYCOPYRROLATE 1 MG: 1 TABLET ORAL at 16:50

## 2021-02-22 RX ADMIN — INSULIN LISPRO 1 UNITS: 100 INJECTION, SOLUTION INTRAVENOUS; SUBCUTANEOUS at 11:33

## 2021-02-22 RX ADMIN — APIXABAN 10 MG: 5 TABLET, FILM COATED ORAL at 17:08

## 2021-02-22 RX ADMIN — METHADONE HYDROCHLORIDE 70 MG: 10 TABLET ORAL at 09:30

## 2021-02-22 RX ADMIN — INSULIN GLARGINE 4 UNITS: 100 INJECTION, SOLUTION SUBCUTANEOUS at 22:06

## 2021-02-22 RX ADMIN — INSULIN LISPRO 1 UNITS: 100 INJECTION, SOLUTION INTRAVENOUS; SUBCUTANEOUS at 16:49

## 2021-02-22 RX ADMIN — ACETAMINOPHEN 975 MG: 325 TABLET ORAL at 15:49

## 2021-02-22 RX ADMIN — LISINOPRIL 10 MG: 10 TABLET ORAL at 09:36

## 2021-02-22 RX ADMIN — Medication 2000 UNITS: at 09:30

## 2021-02-22 RX ADMIN — ACETAMINOPHEN 975 MG: 325 TABLET ORAL at 22:06

## 2021-02-22 RX ADMIN — SENNOSIDES 8.6 MG: 8.6 TABLET, FILM COATED ORAL at 09:33

## 2021-02-22 RX ADMIN — MICONAZOLE NITRATE 1 APPLICATION: 20 CREAM TOPICAL at 16:51

## 2021-02-22 RX ADMIN — MICONAZOLE NITRATE 1 APPLICATION: 20 CREAM TOPICAL at 22:06

## 2021-02-22 RX ADMIN — Medication 1 TABLET: at 09:30

## 2021-02-22 RX ADMIN — ENOXAPARIN SODIUM 70 MG: 80 INJECTION SUBCUTANEOUS at 09:30

## 2021-02-22 NOTE — PHYSICAL THERAPY NOTE
Physical Therapy Treatment Note    Patient Name: Vignesh Kelly    FVMCO'L Date: 2/22/2021     Problem List  Principal Problem:    Left Hydropneumothorax  Active Problems:    HTN (hypertension)    Methadone maintenance therapy patient ([facility] )    BMI 28 0-28 9,adult    Type 2 diabetes mellitus, with long-term current use of insulin (HCC)    Acute respiratory failure with hypoxia (HCC)    Aortic thrombus (HCC)    Anemia    Muscle weakness of right upper extremity    Oropharyngeal dysphagia    Edema of right upper extremity    Ischemia of right lower extremity with suspected rhabdomyolysis    Right femoral vein DVT (HCC)    RUE weakness    Prolonged Q-T interval on ECG       Past Medical History  Past Medical History:   Diagnosis Date    Diabetes mellitus ([place] [address] )     GERD (gastroesophageal reflux disease)     Hypercholesteremia     Hypertension     Methadone use ([place] [address] )         Past Surgical History  Past Surgical History:   Procedure Laterality Date    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    Procedure: AMPUTATION ABOVE KNEE (AKA);   Surgeon: Martha Major MD;  [facility];  Service: Vascular    AMPUTATION ABOVE KNEE (AKA) Right 1/18/2021    Procedure: AMPUTATION ABOVE KNEE (AKA) FORMALIZATION,  R AKA wound washout, wound closure;  Surgeon: Martha Major MD;  [facility];  Service: Vascular    ARTERIOGRAM Right 1/13/2021    Procedure: ARTERIOGRAM;  Surgeon: Cheyenne Russell DO;  [facility];  Service: Vascular    IR CHEST TUBE PLACEMENT  2/10/2021    IR LOWER EXTREMITY ANGIOGRAM  1/14/2021    THROMBECTOMY W/ EMBOLECTOMY Right 1/13/2021    Procedure: EMBOLECTOMY/THROMBECTOMY LOWER EXTREMITY;  Surgeon: Cheyenne Russell DO;  [facility];  Service: Vascular         02/22/21 1054   PT Last Visit   PT Visit Date 02/22/21   Note Type   Note Type Treatment   Pain Assessment   Pain Assessment Tool 0-10   Pain Score 7 Pain Location/Orientation Orientation: Right;Location: Buttocks   Restrictions/Precautions   Weight Bearing Precautions Per Order Yes   RLE Weight Bearing Per Order NWB  (Recent AKA)   Other Precautions Cognitive; Chair Alarm; Restraints; Bed Alarm;WBS;Aspiration; Fall Risk;Pain   General   Chart Reviewed Yes   Cognition   Overall Cognitive Status Impaired   Arousal/Participation Alert; Responsive; Cooperative   Attention Attends with cues to redirect   Orientation Level Oriented X4   Memory Decreased recall of precautions;Decreased short term memory   Following Commands Follows one step commands with increased time or repetition   Comments Very pleasant  Flat  affect  Does require encourgement at times 2* emotional limitations and frustration about progress   Bed Mobility   Rolling R 4  Minimal assistance  (x2)   Additional items Assist x 1; Increased time required;Verbal cues;LE management   Supine to Sit 3  Moderate assistance   Additional items Assist x 1; Increased time required;Verbal cues; Other   Sit to Supine 5  Supervision   Additional Comments With cues, pt able to move BLE off the EOB  Required A to push up from bed rail to move from supine to sit  Transfers   Sit to Stand 2  Maximal assistance   Additional items Assist x 2; Increased time required;Verbal cues   Stand to Sit 2  Maximal assistance   Additional items Assist x 2; Increased time required;Verbal cues   Additional Comments Only able to stand approx ~25% with A x 2 and L knee blocked  Performed x2 trials and able to maintain standing for approx ~2 seconds   Poor trunk and hip extension   Balance   Static Sitting Poor +   Dynamic Sitting Poor +   Static Standing Poor -   Dynamic Standing Poor -   Endurance Deficit   Endurance Deficit Yes   Endurance Deficit Description fatigue   Activity Tolerance   Activity Tolerance Patient limited by fatigue   Medical Staff Made Aware DARRYN Williamson   Nurse Made Aware Yes   Exercises   Knee AROM [place] [employer] Sitting;Left;AROM  (1 set of 22, 1 set of 23)   Heel Cord Stretch Sitting;PROM  (30 seconds x 3)   Balance training  Unsupported sitting EOB for approx ~15 minutes, required close supervision but min A x 1 to perform a task, constant cues to keep head upright  Assessment   Prognosis Fair   Problem List Decreased strength;Decreased endurance; Impaired balance;Decreased mobility; Decreased skin integrity   Assessment Patient seen for physical therapy session with a focus on bed mobility, activity tolerance, sitting tolerance, unsupported sitting balance, and there-ex  Improved sitting tolerance, able to sitting EOB for approx 15 minutes today  Able to increase there-ex reps today  Does require encouragement at times to participation due to emotional limitation and frustration about progress  Performed 2 STS, only able to achieve ~25% of standing with max A x 2  Overall, pt continues to make steady progress towards therapy goals  Recommend STR Upon discharge once medically stable  Barriers to Discharge Inaccessible home environment;Decreased caregiver support   Goals   Patient Goals To get better   STG Expiration Date 02/26/21   PT Treatment Day 8   Plan   Treatment/Interventions LE strengthening/ROM; Functional transfer training;ADL retraining; Therapeutic exercise; Endurance training;Bed mobility;Gait training;Patient/family training;Cognitive reorientation   Progress Progressing toward goals   PT Frequency   (3-5x/wk)   Recommendation   PT Discharge Recommendation Post-Acute Rehabilitation Services   Equipment Recommended Wheelchair   PT - OK to Discharge Yes   Additional Comments to STR once medically stable   AM-PAC Basic Mobility Inpatient   Turning in Bed Without Bedrails 3   Lying on Back to Sitting on Edge of Flat Bed 2   Moving Bed to Chair 1   Standing Up From Chair 1   Walk in Room 1   Climb 3-5 Stairs 1   Basic Mobility Inpatient Raw Score 9   Turning Head Towards Sound 4   Follow Simple Instructions 4 Low Function Basic Mobility Raw Score 17   Low Function Basic Mobility Standardized Score 27 46       Rafi Seymour PT, DPT

## 2021-02-22 NOTE — UTILIZATION REVIEW
Continued Stay Review    Date: 2-22-21                        Current Patient Class: inpatient  Current Level of Care: med surg     HPI:60 y o  male initially admitted on  1-13-21  Left hydropneumothorax, fungal empyema    Assessment/Plan:     Patient is currently on lovenox  Plan to change to eliquis if affordable  Initiate eliquis 10 mg loading doses bid  Antifungals tolerated  Monitor cbc with diff and cm closely  No longer requires oxygen  Pertinent Labs/Diagnostic Results:       Results from last 7 days   Lab Units 02/22/21  0606 02/21/21  1100 02/21/21  0005 02/20/21  0402 02/19/21  2031 02/19/21  0605 02/16/21  0532   WBC Thousand/uL 10 14  --   --  10 20*  --  8 87 7 21   HEMOGLOBIN g/dL 8 7* 8 5* 8 5* 8 0* 8 3* 8 7* 7 7*   HEMATOCRIT % 29 9* 28 6* 28 5* 27 1* 27 9* 29 1* 25 4*   PLATELETS Thousands/uL 390  --   --  389  --  387 261   NEUTROS ABS Thousands/µL  --   --   --  8 06*  --   --  5 58    < > = values in this interval not displayed           Results from last 7 days   Lab Units 02/22/21  0606 02/21/21  0455 02/20/21  0402 02/19/21  0605 02/18/21  0644   SODIUM mmol/L 140 143 141 143 138   POTASSIUM mmol/L 3 6 3 8 3 3* 3 2* 3 2*   CHLORIDE mmol/L 107 108 105 105 99*   CO2 mmol/L 30 29 31 33* 35*   ANION GAP mmol/L 3* 6 5 5 4   BUN mg/dL 13 9 7 7 9   CREATININE mg/dL 0 42* 0 50* 0 42* 0 43* 0 35*   EGFR ml/min/1 73sq m 125 116 125 124 134   CALCIUM mg/dL 8 4 8 0* 7 7* 7 4* 8 3   MAGNESIUM mg/dL  --  1 8 1 5*  --   --      Results from last 7 days   Lab Units 02/16/21  0532   AST U/L 13   ALT U/L 10*   ALK PHOS U/L 80   TOTAL PROTEIN g/dL 6 5   ALBUMIN g/dL 1 2*   TOTAL BILIRUBIN mg/dL 0 73     Results from last 7 days   Lab Units 02/22/21  1618 02/22/21  1102 02/22/21  0702 02/21/21  2152 02/21/21  1613 02/21/21  1053 02/21/21  0710 02/20/21  2128 02/20/21  1611 02/20/21  1052 02/20/21  0635 02/19/21  2046   POC GLUCOSE mg/dl 156* 160* 130 157* 197* 201* 136 209* 109 178* 114 124 Results from last 7 days   Lab Units 02/22/21  0606 02/21/21  0455 02/20/21  0402 02/19/21  0605 02/18/21  0644 02/16/21  0532   GLUCOSE RANDOM mg/dL 114 124 95 86 66 121             BETA-HYDROXYBUTYRATE   Date Value Ref Range Status   01/11/2021 6 2 (H) <0 6 mmol/L Final        Vital Signs:       Date/Time  Temp  Pulse  Resp  BP  MAP (mmHg)  SpO2   02/22/21 15:05:38  98 5 °F (36 9 °C)  --  19  137/88  104  95 %   02/22/21 09:34:52  --  83  17  130/72  91  --   02/22/21 07:56:24  97 6 °F (36 4 °C)  --  18  125/73  90  --                 Medications:     acetaminophen, 975 mg, Oral, Q8H [facility]  albumin human, 25 g, Intravenous, Once  apixaban, 10 mg, Oral, BID  [START ON 3/1/2021] apixaban, 5 mg, Oral, BID  cholecalciferol, 2,000 Units, Oral, Daily  Diclofenac Sodium, 4 g, Topical, 4x Daily  furosemide, 40 mg, Oral, Daily  glycopyrrolate, 1 mg, Oral, TID  insulin glargine, 4 Units, Subcutaneous, HS  insulin lispro, 1-5 Units, Subcutaneous, TID AC  lisinopril, 10 mg, Oral, Daily  methadone, 70 mg, Oral, Daily  micafungin, 100 mg, Intravenous, Q24H  SAMREEN ANTIFUNGAL, 1 application, Topical, TID  multivitamin-minerals, 1 tablet, Oral, Daily  omeprazole (PRILOSEC) suspension 2 mg/mL, 20 mg, Oral, Early Morning  polyethylene glycol, 17 g, Oral, Daily  senna, 1 tablet, Oral, BID  sodium chloride, 1,000 mL, Intravenous, Once      Continuous IV Infusions:     PRN Meds:  albuterol, 2 puff, Inhalation, Q4H PRN  bisacodyl, 10 mg, Rectal, Daily PRN  Lidocaine Viscous HCl, 15 mL, Swish & Spit, 4x Daily PRN  menthol-methyl salicylate, , Apply externally, 4x Daily PRN  ondansetron, 4 mg, Intravenous, Q6H PRN  phenol, 1 spray, Mouth/Throat, Q2H PRN  sodium chloride, 1 spray, Each Nare, Q1H PRN        Discharge Plan: to be determined     Network Utilization Review Department  ATTENTION: Please call with any questions or concerns to [phone] and carefully listen to the prompts so that you are directed to the right person   All voicemails are confidential   Robin Crawford all requests for admission clinical reviews, approved or denied determinations and any other requests to dedicated fax number below belonging to the campus where the patient is receiving treatment   List of dedicated fax numbers for the Facilities:  [address] DENIALS (Administrative/Medical Necessity) [phone]   [address] (Maternity/NICU/Pediatrics) [phone]   [address] [place] [address] [address] Malia Robles [address] (  [facility] [address] [place] [address] [address]me[address] [place]) [address] [phone]

## 2021-02-22 NOTE — RESTORATIVE TECHNICIAN NOTE
Restorative Specialist Mobility Note       Activity: Dangle, Stand at bedside(Educated/encouraged pt to sit EOB/stand with assistance  Bed alarm on   Pt callbell, phone/tray within reach )     Assistive Device: Other (Comment)(Assist x2 to sit EOB/stand )         Daly GUALLPA, Restorative Technician, [employer]

## 2021-02-22 NOTE — PROGRESS NOTES
Progress Note Sandra Carrel 1960, 56 y o  male MRN: [id number]    Unit/Bed#: [facility] 726-01 Encounter: [id number]    Primary Care Provider: VERONICA Hanna   Date and time admitted to hospital: 1/13/2021  2:28 PM        * Left Hydropneumothorax  Assessment & Plan  Candida empyema  Status post pigtail  On 2/17/21  Now on room air  Pulmonology and thoracic surgery following  ID on board - fluconazole switched to micafungin due to prolonged QTC along with methadone use  PICC line consent obtained and will need PICC line for IV micafungin until 03/12/2021  Follow-up in the thoracic office 1 month after the discharge from the hospital with chest x-ray PA and lateral results    Ischemia of right lower extremity with suspected rhabdomyolysis  Assessment & Plan  · Suspected embolic from aortic thrombus  · 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  · Continue therapeutic Lovenox  ; Eliquis scripts sent to patient's pharmacy for price check  · Currently stable from a vascular surgery standpoint    Prolonged Q-T interval on ECG  Assessment & Plan  -the patient has prolonged QTC of 487, patient is on methadone and fluconazole  -Patient is on methadone for years  -Patient fluconazole was switched to micafungin per y ID    Right femoral vein DVT (HCC)  Assessment & Plan  Continue therapeutic Lovenox  Eliquis script sent to patient's primary pharmacy for price check      following  If Eliquis in affordable, can switch from subcu Lovenox therapeutic dose to Eliquis while in hospital     Edema of right upper extremity  Assessment & Plan  · +2 pitting edema also with significant weakness/paresis   · S/p Venous duplex on 01/26 negative for acute or chronic DVT   · Resumed back on lasix with some improvement  · X-ray right humerus negative      Oropharyngeal dysphagia  Assessment & Plan  · Extubated 1/25 and required NGT for nutrition   · Speech therapy following   · S/p barium swallow - no evidence of leakage  · Continue modified diet  · Continue robinul  · S/p ENT consult, no vocal cord motion impairment  · Status post video barium swallow on 02/16  Muscle weakness of right upper extremity  Assessment & Plan  · Profound weakness, predominantly in right upper extremity, especially due to critical illness and steroid use  · During hospitalization, stroke alert was called because of concern for worsening right upper extremity weakness  CT head showed questionable hypodensity but no evidence of acute stroke or bleeding  · Patient currently on therapeutic Lovenox  · Strength currently 2 to 3/5 at baseline  · Evaluated by Neurology  No further inpatient workup required  · Right upper extremity x-ray per family request showed no acute intraosseous abnormality  Anemia  Assessment & Plan  · Secondary to critical illness, no sign of acute blood loss  · Neg stool occult  · Hemoglobin stable around his baseline  · Continue to monitor and transfuse if < 7    Aortic thrombus (HCC)  Assessment & Plan  · With embolic event that compromised right femoral artery  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  S/p R AKA formalization 1/18   · Continue therapeutic lovenox  · F/u with vascular surgery as outpatient    Acute respiratory failure with hypoxia ([address] )  Assessment & Plan  · Secondary to COVID 19  · Extubated 1/25  · Now resolved and on room air  Type 2 diabetes mellitus, with long-term current use of insulin [facility])  Assessment & Plan  Lab Results   Component Value Date    HGBA1C 11 6 (H) 12/09/2020       Recent Labs     02/21/21  1613 02/21/21  2152 02/22/21  0702 02/22/21  1102   POCGLU 197* 157* 130 160*       Blood Sugar Average: Last 72 hrs:  (P) 147 [id number]     · BBG on the lower side, patient does not eating much due to dysphagia  · Continue Lantus 10 units + SSI  · Discontinue 3 units Humalog t i d  With meals        BMI 28 0-28 9,adult  Assessment & Plan        Methadone maintenance therapy patient [facility])  Assessment & Plan  Chronic methadone use  Methadone was held yesterday due to lethargy yesterday  Restarted methadone today as patient requested due to significant limb pain  And he is on chronic methadone therapy  Will continue to hold gabapentin for now to avoid over sedation and lethargy  Fluconazole discontinued due to prolonged QTC with concomitant methadone fluconazole use  HTN (hypertension)  Assessment & Plan  · Stable  · Continue lisinopril, lasix        VTE Pharmacologic Prophylaxis:   Pharmacologic: Enoxaparin (Lovenox)  Mechanical VTE Prophylaxis in Place: Yes    Patient Centered Rounds: I have performed bedside rounds with nursing staff today  Discussions with Specialists or Other Care Team Provider: CM, ID, Swallow therapy    Time Spent for Care: 30 minutes  More than 50% of total time spent on counseling and coordination of care as described above  Current Length of Stay: 40 day(s)    Current Patient Status: Inpatient   Certification Statement: The patient will continue to require additional inpatient hospital stay due to Pending placement    Discharge Plan:  Pending placement    Code Status: Level 1 - Full Code      Subjective:   No overnight events reported  Patient reports that he had some pain in his lower extremity requested if Tylenol dose can be increased  No nausea or vomiting    Objective:     Vitals:   Temp (24hrs), Av 1 °F (36 7 °C), Min:97 6 °F (36 4 °C), Max:98 5 °F (36 9 °C)    Temp:  [97 6 °F (36 4 °C)-98 5 °F (36 9 °C)] 97 6 °F (36 4 °C)  HR:  [71-83] 83  Resp:  [16-18] 17  BP: (125-155)/(72-83) 130/72  SpO2:  [94 %-96 %] 94 %  Body mass index is 23 48 kg/m²  Input and Output Summary (last 24 hours):        Intake/Output Summary (Last 24 hours) at 2021 1423  Last data filed at 2021 1135  Gross per 24 hour   Intake 50 ml   Output 550 ml   Net -500 ml       Physical Exam:     Physical Exam  Exam  Constitutional: Gume Pariss is not in acute distress  Cardiovascular: Rate and Rhythm: Normal rate and regular rhythm  Heart sounds: Normal heart sounds  No murmur  Pulmonary: Effort: No respiratory distress  Breath sounds: No wheezing or rales  Coarse breath sounds  Chest tube noted  Abdominal: Bowel sounds are normal  There is no distension  soft  here is no abdominal tenderness  Musculoskeletal:   Right AKA noted   Neurological:      Mental Status: He is alert       Comments: Awake alert and communicative     Additional Data:     Labs:    Results from last 7 days   Lab Units 02/22/21  0606  02/20/21  0402   WBC Thousand/uL 10 14  --  10 20*   HEMOGLOBIN g/dL 8 7*   < > 8 0*   HEMATOCRIT % 29 9*   < > 27 1*   PLATELETS Thousands/uL 390  --  389   NEUTROS PCT %  --   --  78*   LYMPHS PCT %  --   --  11*   MONOS PCT %  --   --  8   EOS PCT %  --   --  1    < > = values in this interval not displayed  Results from last 7 days   Lab Units 02/22/21  0606  02/16/21  0532   SODIUM mmol/L 140   < > 137   POTASSIUM mmol/L 3 6   < > 3 6   CHLORIDE mmol/L 107   < > 101   CO2 mmol/L 30   < > 32   BUN mg/dL 13   < > 16   CREATININE mg/dL 0 42*   < > 0 46*   ANION GAP mmol/L 3*   < > 4   CALCIUM mg/dL 8 4   < > 7 7*   ALBUMIN g/dL  --   --  1 2*   TOTAL BILIRUBIN mg/dL  --   --  0 73   ALK PHOS U/L  --   --  80   ALT U/L  --   --  10*   AST U/L  --   --  13   GLUCOSE RANDOM mg/dL 114   < > 121    < > = values in this interval not displayed  Results from last 7 days   Lab Units 02/22/21  1102 02/22/21  0702 02/21/21  2152 02/21/21  1613 02/21/21  1053 02/21/21  0710 02/20/21  2128 02/20/21  1611 02/20/21  1052 02/20/21  0635 02/19/21  2046 02/19/21  1608   POC GLUCOSE mg/dl 160* 130 157* 197* 201* 136 209* 109 178* 114 124 107                   * I Have Reviewed All Lab Data Listed Above  * Additional Pertinent Lab Tests Reviewed:  All Labs Within Last 24 Hours Reviewed    Imaging:    XR humerus right   Final Result by Marissa Sarmiento MD (02/18 9018) No acute osseous abnormality  Workstation performed: [id number]         FL barium swallow   Final Result by Nicho Hobson MD (02/18 1107)      No leak  Workstation performed: [id number]         XR chest pa & lateral   Final Result by Kimberlyn Casey MD (02/18 0820)      Left hydropneumothorax, unchanged after removal of pigtail left chest drainage catheter  Workstation performed: [id number]         XR chest portable   Final Result by French Nation MD (02/17 4242)      Persistent left pneumothorax, mildly increased in size  Left pleural fluid, moderately increased in amount  Workstation performed: [id number]         FL barium swallow video w speech   Final Result by ROCHELLE KELLEY (02/16 1154)      XR chest portable   Final Result by Yamile Miller MD (02/15 1037)      No change in moderate left pneumothorax  Left pleural effusion on recent CT not visible      Persistent extensive bilateral consolidation and groundglass opacity related to Covid 19 pneumonia/post infectious scar  Workstation performed: [id number]         XR chest portable   Final Result by French Nation MD (02/15 0186)      Persistent but mildly diminished in size left-sided pneumothorax; no other significant change                  Workstation performed: [id number]         CTA head and neck w wo contrast   Final Result by Edita Diana MD (02/13 7042)      No large vessel flow restrictive disease within the head or neck  No evidence of high-grade proximal stenosis of the visualized [place]  No evidence of acute arterial dissection  Portions of the right transverse sinus are not well visualized, however, the left-sided transverse sinus is prominent and appears dominant    If there is clinical concern for acute pathology involving the right transverse sinus, MRV could be performed for    further evaluation, if there are no contraindications  There is subcutaneous emphysema in the left chest wall and tracking into the soft tissues of the posterior left neck  This is new compared with a CT of the chest performed February 13, 2021 at 2:57 AM              I personally discussed this study with Fernanda CASTRO on 2/13/2021 at 11:25 PM                      Workstation performed: [id number]         CT stroke alert brain   Final Result by Gaines Eisenmenger, MD (02/13 6253)      No acute intracranial hemorrhage  Questionable subtle hypodensity superior aspect left cerebral peduncle  MRI with diffusion-weighted imaging is recommended for further evaluation, if there are no contraindications  Findings were directly discussed with Danica CASTRO on 2/13/2021 10:40 PM       Workstation performed: [id number]         XR chest portable   Final Result by Peggy Ludwig MD (02/13 2150)      No significant change in moderate left pneumothorax  Left basilar pleural pigtail catheter is present  There is now subcutaneous emphysema in the left lateral chest wall  Clinical correlation and follow-up is recommended  Stable extensive bilateral groundglass opacity  I personally discussed this study with Fernanda CASTRO on 2/13/2021 at 9:49 PM                             Workstation performed: [id number]         CT chest wo contrast   Final Result by Gaines Eisenmenger, MD (02/13 0802)      There is a moderate-sized left hydropneumothorax which appears at least partially loculated  This appears similar in size to the February 12, 2021 plain film examination  A smallbore pigtail chest tube catheter is in place in the posterolateral lower    left hemithorax pleural space; the metallic marker is located just lateral to the rib margin  Extensive areas of dense consolidation are present bilaterally within the lungs  This appears significantly worse compared with January 12, 2021        The blood in the cardiac chambers appears somewhat hypodense relative to the ventricular walls  This suggests anemia  Correlation with hemoglobin/hematocrit levels is recommended  Cholelithiasis  The images are available for clinical review  Workstation performed: [id number]         XR chest portable   Final Result by Judy Naik MD (02/12 1143)      Slight increase in moderate left pneumothorax  Workstation performed: [id number]         XR chest portable   Final Result by Judy Naik MD (02/11 1628)      No change in moderate left pneumothorax  Question development of pneumomediastinum  The study was marked in EPIC for significant notification  Workstation performed: [id number]         XR chest portable   Final Result by Judy Naik MD (02/11 9094)      Left pigtail catheter with minimal decrease in moderate left pneumothorax  Persistent extensive bilateral groundglass opacity, likely post infectious scar from Covid 19 pneumonia  Workstation performed: [id number]         XR chest portable   Final Result by Judy Naik MD (02/11 1120)      Left pigtail insertion with drainage of the left effusion  Increase in size of left pneumothorax, now moderate  Persistent extensive bilateral groundglass opacity as a result of Covid-19 pneumonia,  Likely post infectious scar  Workstation performed: [id number]         IR chest tube placement   Final Result by Divya Carr MD (02/11 1727)   Impression:   1  Successful placement of a therapeutic and [place] all-purpose drainage catheter into the left pleural space under ultrasound guidance  Workstation performed: [id number]         XR chest portable   Final Result by Judy Niak MD (02/10 8616)      Moderate new left hydropneumothorax  Persistent extensive bilateral groundglass opacity, likely related to Covid 19          I personally discussed this study with Shanice Razo on 2/10/2021 at 9:17 AM                            Workstation performed: [id number]         CT head wo contrast   Final Result by Lelo Loyola MD (02/08 1802)      No acute intracranial abnormality  Stable chronic encephalomalacia on the right  Workstation performed: [id number]         FL barium swallow video w speech   Final Result by ROCHELLE KELLEY (02/02 1117)      XR chest portable   Final Result by Dave Vickers MD (02/01 6375)   1  Persistent bilateral airspace opacities suspicious for multifocal pneumonia  2   Enteric tube is not visualized (in this patient with history of check NG tube placement)  The study was marked in EPIC for significant notification  Workstation performed: [id number]         XR abdomen 1 view kub   Final Result by Dave Vickers MD (02/01 5400)   1  Nonobstructive bowel gas pattern  NG tube is not visualized (given history of check NG tube placement)  2   Patchy bilateral opacities are again noted at the bilateral lung bases  Workstation performed: [id number]         XR chest portable ICU   Final Result by Pancho Woodall MD (01/22 2740)      Right jugular catheter at cavoatrial junction with no pneumothorax      No change in extensive bilateral groundglass opacity due to Covid 19 pneumonia  Workstation performed: [id number]         XR chest portable ICU   Final Result by Pancho Woodall MD (01/20 5937)      Worsening bilateral groundglass opacity due to Covid 19  Workstation performed: [id number]         VAS upper limb venous duplex scan, complete, bilateral   Final Result by Laxmi Gardner MD (01/17 3152)      XR chest portable ICU   Final Result by Carla Powell MD (01/15 2153)      1  Stable opacities   2    Stable lines and tubes                  Workstation performed: [id number]         IR lower extremity angiogram   Final Result by Sara Rodriguez (01/22 7081) XR chest portable   Final Result by Cesar Cheng MD (01/13 1821)         1  Interim slight improvement in bilateral interstitial and alveolar groundglass opacification is noted consistent with Covid 19 pneumonia with possible interim improvement of superimposed edema  2   Lines and tubes as noted                    Workstation performed: [id number]         XR chest pa & lateral    (Results Pending)       Recent Cultures (last 7 days):           Last 24 Hours Medication List:   Current Facility-Administered Medications   Medication Dose Route Frequency Provider Last Rate    acetaminophen  975 mg Oral [facility] Marva Lockwood MD      albumin human  25 g Intravenous Once Mariano Osborn MD Stopped (02/15/21 1715)    albuterol  2 puff Inhalation Q4H PRN Eliezer Schroeder, DO      bisacodyl  10 mg Rectal Daily PRN VERONICA Fox      cholecalciferol  2,000 Units Oral Daily Shannan A Sedora, CRNP      Diclofenac Sodium  4 g Topical 4x Daily Shannan A Sedora, CRNP      enoxaparin  1 mg/kg Subcutaneous Q12H [facility] Mariano Osborn MD      furosemide  40 mg Oral Daily [place],       glycopyrrolate  1 mg Oral TID Shannan A Sedora, VERONICA      insulin glargine  4 Units Subcutaneous HS Robert Severino MD      insulin lispro  1-5 Units Subcutaneous TID AC Gabino Hdz MD      Lidocaine Viscous HCl  15 mL Swish & Spit 4x Daily PRN Shannan A Sedora, VERONICA      lisinopril  10 mg Oral Daily Shannan A Vallarie Reamer, VERONICA      menthol-methyl salicylate   Apply externally 4x Daily PRN VERONICA Fox      methadone  70 mg Oral Daily Marva Lockwood MD      micafungin  100 mg Intravenous Q24H Marva Lockwood  mg (02/21/21 5874)   Cathlene Salon SAMREEN ANTIFUNGAL  1 application Topical TID Sonia McdonaldaDO      multivitamin-minerals  1 tablet Oral Daily Shannan A Sedora, VERONICA      omeprazole (PRILOSEC) suspension 2 mg/mL  20 mg Oral Early Morning Shannan A Sedora, CRJASPER      ondansetron  4 mg Intravenous Q6H PRN Toby Beyer PA-C      phenol  1 spray Mouth/Throat Q2H PRN VERONICA Kennedy      polyethylene glycol  17 g Oral Daily VERONICA Kennedy      senna  1 tablet Oral BID VERONICA Kennedy      sodium chloride  1 spray Each Nare Q1H PRN Sol Chol, DO      sodium chloride  1,000 mL Intravenous Once Marta Oseguera MD Stopped (02/15/21 4636)        Today, Patient Was Seen By: Manoj Finch MD    ** Please Note: Dictation voice to text software may have been used in the creation of this document   **

## 2021-02-22 NOTE — PLAN OF CARE
Problem: PHYSICAL THERAPY ADULT  Goal: Performs mobility at highest level of function for planned discharge setting  See evaluation for individualized goals  Description: Treatment/Interventions: Functional transfer training, LE strengthening/ROM, Therapeutic exercise, Endurance training, Bed mobility          See flowsheet documentation for full assessment, interventions and recommendations  Outcome: Progressing  Note: Prognosis: Fair  Problem List: Decreased strength, Decreased endurance, Impaired balance, Decreased mobility, Decreased skin integrity  Assessment: Patient seen for physical therapy session with a focus on bed mobility, activity tolerance, sitting tolerance, unsupported sitting balance, and there-ex  Improved sitting tolerance, able to sitting EOB for approx 15 minutes today  Able to increase there-ex reps today  Does require encouragement at times to participation due to emotional limitation and frustration about progress  Performed 2 STS, only able to achieve ~25% of standing with max A x 2  Overall, pt continues to make steady progress towards therapy goals  Recommend STR Upon discharge once medically stable  Barriers to Discharge: Inaccessible home environment, Decreased caregiver support     PT Discharge Recommendation: [id number]     PT - OK to Discharge: Yes    See flowsheet documentation for full assessment

## 2021-02-22 NOTE — PLAN OF CARE
Problem: OCCUPATIONAL THERAPY ADULT  Goal: Performs self-care activities at highest level of function for planned discharge setting  See evaluation for individualized goals  Description: Treatment Interventions: ADL retraining, Functional transfer training, UE strengthening/ROM, Endurance training, Cognitive reorientation, Patient/family training, Equipment evaluation/education, Compensatory technique education, Continued evaluation, Activityengagement          See flowsheet documentation for full assessment, interventions and recommendations  Outcome: Progressing  Note: Limitation: Decreased ADL status, Decreased Safe judgement during ADL, Decreased UE strength, Decreased UE ROM, Decreased cognition, Decreased endurance, Decreased self-care trans, Decreased high-level ADLs, Decreased sensation, Decreased fine motor control, Visual deficit  Prognosis: Fair  Assessment: Pt is seen for OT treatment session 2/22/21 including interventions to: increase activity tolerance, improve functional use of R UE, improve dynamic sitting balance, improve trunk/postural strength  In comparison to previous session, pt with improved R UE strength, improved endurance, improved dynamic sitting balance  Pt continues to function below his functional baseline, and is to continue to benefit from skilled occupational therapy services while in the hospital to maximize functioning and independence with daily activities  OT to continue to recommend STR upon d/c      OT Discharge Recommendation: Post-Acute Rehabilitation Services  OT - OK to Discharge:  Yes

## 2021-02-22 NOTE — ASSESSMENT & PLAN NOTE
Continue therapeutic Lovenox  Eliquis script sent to patient's primary pharmacy for price check      following    If Eliquis in affordable, can switch from subcu Lovenox therapeutic dose to Eliquis while in hospital

## 2021-02-22 NOTE — QUICK NOTE
Informed by  that Eliquis is covered by patient's insurance without any co-pay  Switched subcu Lovenox therapeutic dose to Eliquis with load does 10 mg b i d  for 7 days followed by 5 mg b i d

## 2021-02-22 NOTE — ASSESSMENT & PLAN NOTE
Lab Results   Component Value Date    HGBA1C 11 6 (H) 12/09/2020       Recent Labs     02/21/21  1613 02/21/21  2152 02/22/21  0702 02/22/21  1102   POCGLU 197* 157* 130 160*       Blood Sugar Average: Last 72 hrs:  (P) 147 [id number]     · BBG on the lower side, patient does not eating much due to dysphagia  · Continue Lantus 10 units + SSI  · Discontinue 3 units Humalog t i d  With meals

## 2021-02-22 NOTE — PROGRESS NOTES
Progress Note - Infectious Disease   Truong Baxter 56 y o  male MRN: [id number]  Unit/Bed#: [facility] 726-01 Encounter: [id number]      Impression/Plan:  1  Left-sided candidal empyema-unclear primary process   No clear evidence of an esophageal perforation at least clinically and radiographically   Consideration for the possibility of a pneumonia with colonization of the airways with Candida and a spontaneous pneumothorax contaminating the pleural space   Consideration for the possibility of a transient fungemia which is now resolved   Fortunately the patient remains hemodynamically stable   He has undergone chest tube drainage and is receiving tPA installations   He has clinically improved   Tolerating oral antifungals without difficulty   EKG with prolonged QT in the setting of methadone and fluconazole   Patient has now had chest tube removed  -continue micafungin through 3/12/2022 to complete 4 weeks total treatment  -no additional fluconazole  -may eventually need decortication  -recheck CBC with diff and CMP at least weekly while on treatment  -pulmonary follow-up     2  Fever-suspect all secondary to the fungal empyema   The blood cultures remain negative and the patient remains hemodynamically stable   He seems to be tolerating the antifungals without difficulty   No recurrence  Blood cultures were negative   -antifungals as above  -no additional ID workup for now     3  COVID 19 pneumonia-status post the initiation of the severe treatment protocol   Patient has now completed that treatment  [facility] did not receive any antivirals such as remdesivir or plasma due to the late phase of illness in which treatment was initiated   Is much improved clinically  -no additional COVID-19 directed treatment  -monitor respiratory status     4  Acute hypoxic respiratory failure-likely multifactorial including COVID-19 pneumonia, and now hydropneumothorax in the setting of presumptive candidal empyema    He is no longer requiring oxygen  -O2 support as needed  -wean off oxygen as able  -no additional ID workup for now     5  Acute right lower extremity ischemia-with suspected rhabdomyolysis   The patient is now status 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  Krishna Max has been continuing on anticoagulation  No local wound cellulitis noted  -no additional directed antibiotics  -vascular follow-up  -local wound care     6  Diabetes mellitus-type 2 with long-term insulin use and hyperglycemia    7  Sacral decubitus ulcer-unstageable  Local wound care    Discussed the above management plan with the primary service    Antibiotics:  Micafungin restart 6  Antifungals 10    Subjective:  Patient has no fever, chills, sweats; no nausea, vomiting, diarrhea; no cough, shortness of breath; no pain  No new symptoms  Objective:  Vitals:  Temp:  [97 6 °F (36 4 °C)-98 5 °F (36 9 °C)] 97 6 °F (36 4 °C)  HR:  [71-83] 83  Resp:  [16-18] 17  BP: (125-155)/(72-83) 130/72  SpO2:  [94 %-96 %] 94 %  Temp (24hrs), Av 1 °F (36 7 °C), Min:97 6 °F (36 4 °C), Max:98 5 °F (36 9 °C)  Current: Temperature: 97 6 °F (36 4 °C)    Physical Exam:   General Appearance:  Alert, interactive, nontoxic, no acute distress  Throat: Oropharynx moist without lesions  Lungs:   Clear to auscultation bilaterally; no wheezes, rhonchi or rales; respirations unlabored   Heart:  RRR; no murmur, rub or gallop   Abdomen:   Soft, non-tender, non-distended, positive bowel sounds  Extremities: No clubbing, cyanosis or edema   Skin: No new rashes or lesions  No draining wounds noted         Labs, Imaging, & Other studies:   All pertinent labs and imaging studies were personally reviewed  Results from last 7 days   Lab Units 21  0606 21  1100 21  0005 21  0402  21  0605   WBC Thousand/uL 10 14  --   --  10 20*  --  8 87   HEMOGLOBIN g/dL 8 7* 8 5* 8 5* 8 0*   < > 8 7*   PLATELETS Thousands/uL 390  --   --  389  --  387    < > = values in this interval not displayed  Results from last 7 days   Lab Units 02/22/21  0606 02/21/21  0455 02/20/21  0402  02/16/21  0532   SODIUM mmol/L 140 143 141   < > 137   POTASSIUM mmol/L 3 6 3 8 3 3*   < > 3 6   CHLORIDE mmol/L 107 108 105   < > 101   CO2 mmol/L 30 29 31   < > 32   BUN mg/dL 13 9 7   < > 16   CREATININE mg/dL 0 42* 0 50* 0 42*   < > 0 46*   EGFR ml/min/1 73sq m 125 116 125   < > 120   CALCIUM mg/dL 8 4 8 0* 7 7*   < > 7 7*   AST U/L  --   --   --   --  13   ALT U/L  --   --   --   --  10*   ALK PHOS U/L  --   --   --   --  80    < > = values in this interval not displayed

## 2021-02-22 NOTE — CASE MANAGEMENT
Pt is alert and oriented x 4  CM informed pt that there is no accepting IP rehab facility  Pt was agreeable for CM to send IP acute referral to [address], referrals in [facility]

## 2021-02-22 NOTE — OCCUPATIONAL THERAPY NOTE
OccupationalTherapy Progress Note     Patient Name: Zackary LENTZOCO'V Date: 2/22/2021  Problem List  Principal Problem:    Left Hydropneumothorax  Active Problems:    HTN (hypertension)    Methadone maintenance therapy patient ([address] )    BMI 28 0-28 9,adult    Type 2 diabetes mellitus, with long-term current use of insulin (HCC)    Acute respiratory failure with hypoxia (HCC)    Aortic thrombus (HCC)    Anemia    Muscle weakness of right upper extremity    Oropharyngeal dysphagia    Edema of right upper extremity    Ischemia of right lower extremity with suspected rhabdomyolysis    Right femoral vein DVT (HCC)    RUE weakness    Prolonged Q-T interval on ECG       02/22/21 1053   OT Last Visit   OT Visit Date 02/22/21   Note Type   Note Type Treatment for insurance authorization   Restrictions/Precautions   Weight Bearing Precautions Per Order Yes   RLE Weight Bearing Per Order NWB  (recent AKA)   Other Precautions Cognitive;WBS;Multiple lines;Telemetry; Fall Risk;Pain;Aspiration   Lifestyle   Autonomy I ADLS/IADLS, transfers and functional mobility PTA   Reciprocal Relationships Pt lives w/ his siblings and nephews   Service to Others Pt works full time   Pain Assessment   Pain Assessment Tool 0-10   Pain Score 7   Pain Location/Orientation Orientation: Right;Location: Buttocks   ADL   Eating Assistance 4  Minimal Assistance   Eating Comments Pt unable to maintain effective grasp on spoon with R UE   OT builtup handle with foam and pt able to stabilize container with L UE, use of R to bring spoon to mouth with increased time and quick fatigue   Grooming Assistance 4  Minimal Assistance   Grooming Comments Pt with difficulty maintaining effective grasp on handle of comb with R hand, becomes quickly frustrated and completes task with use of L UE   UB Dressing Assistance 3  Moderate Assistance   UB Dressing Comments Pt requires assist to thread R UE in gown and assist to pull around back   LB Dressing Assistance 1  Total Assistance   LB Dressing Deficit Don/doff L sock   Toileting Assistance  1  Total Assistance   Bed Mobility   Rolling R 4  Minimal assistance   Additional items Assist x 1; Increased time required;Verbal cues;LE management   Supine to Sit 3  Moderate assistance   Additional items Assist x 1; Increased time required;Verbal cues   Sit to Supine 5  Supervision   Additional Comments Pt able to move b/l LEs off EOB, requires assist with trunk mgmt  Pt tolerated sitting EOB for 14 5 minutes with CGA and tactile cues for neck positioning (able to maintain neutral position for ~45 seconds before fatigue)  Pt requires min VCs for appropriate hand placement EOB  Transfers   Sit to Stand 2  Maximal assistance   Additional items Assist x 2; Increased time required;Verbal cues   Stand to Sit 2  Maximal assistance   Additional items Assist x 2; Increased time required;Verbal cues   Additional Comments Ax2 with L knee block  Attains ~25% of full stand   ROM- Right Upper Extremities   RUE ROM Comment Cylindrical grasp on stress ball 10x2  Theraputty therex 5x each exercise   Cognition   Overall Cognitive Status Impaired   Arousal/Participation Alert; Responsive; Cooperative   Attention Attends with cues to redirect   Orientation Level Oriented X4   Memory Decreased recall of precautions;Decreased short term memory   Following Commands Follows one step commands with increased time or repetition   Comments Pleasant, cooperative  Flat affect  At times requires encouragement 2* depressed mood and frustration regarding situation   Activity Tolerance   Activity Tolerance Patient tolerated treatment well   Medical Staff Made Aware PT, CM   Assessment   Assessment Pt is seen for OT treatment session 2/22/21 including interventions to: increase activity tolerance, improve functional use of R UE, improve dynamic sitting balance, improve trunk/postural strength    In comparison to previous session, pt with improved R UE strength, improved endurance, improved dynamic sitting balance  Pt continues to function below his functional baseline, and is to continue to benefit from skilled occupational therapy services while in the hospital to maximize functioning and independence with daily activities  OT to continue to recommend STR upon d/c    Plan   Treatment Interventions ADL retraining;Functional transfer training;UE strengthening/ROM; Endurance training;Cognitive reorientation;Patient/family training;Equipment evaluation/education; Compensatory technique education;Continued evaluation; Activityengagement   Goal Expiration Date 02/23/21   OT Treatment Day 10   OT Frequency 3-5x/wk   Recommendation   OT Discharge Recommendation Post-Acute Rehabilitation Services   OT - OK to Discharge Yes   AM-PAC Daily Activity Inpatient   Lower Body Dressing 1   Bathing 2   Toileting 1   Upper Body Dressing 2   Grooming 3   Eating 3   Daily Activity Raw Score 12   Daily Activity Standardized Score (Calc for Raw Score >=11) 30 6   Turning Head Towards Sound 4   Follow Simple Instructions 3   Low Function Daily Activity Raw Score 19   Low Function Daily Activity Standardized Score 31 34   AM-PAC Applied Cognition Inpatient   Following a Speech/Presentation 3   Understanding Ordinary Conversation 3   Taking Medications 3   Remembering Where Things Are Placed or Put Away 2   Remembering List of 4-5 Errands 2   Taking Care of Complicated Tasks 2   Applied Cognition Raw Score 15   Applied Cognition Standardized Score 33 54     Carlton Polo, ELDER, OTR/L

## 2021-02-23 LAB
ATRIAL RATE: 85 BPM
GLUCOSE SERPL-MCNC: 125 MG/DL (ref 65–140)
GLUCOSE SERPL-MCNC: 134 MG/DL (ref 65–140)
GLUCOSE SERPL-MCNC: 154 MG/DL (ref 65–140)
GLUCOSE SERPL-MCNC: 167 MG/DL (ref 65–140)
P AXIS: 63 DEGREES
PR INTERVAL: 138 MS
QRS AXIS: 33 DEGREES
QRSD INTERVAL: 78 MS
QT INTERVAL: 370 MS
QTC INTERVAL: 440 MS
T WAVE AXIS: 105 DEGREES
VENTRICULAR RATE: 85 BPM

## 2021-02-23 PROCEDURE — 82948 REAGENT STRIP/BLOOD GLUCOSE: CPT

## 2021-02-23 PROCEDURE — 99233 SBSQ HOSP IP/OBS HIGH 50: CPT | Performed by: INTERNAL MEDICINE

## 2021-02-23 PROCEDURE — 93005 ELECTROCARDIOGRAM TRACING: CPT

## 2021-02-23 PROCEDURE — 97168 OT RE-EVAL EST PLAN CARE: CPT

## 2021-02-23 PROCEDURE — 99232 SBSQ HOSP IP/OBS MODERATE 35: CPT | Performed by: INTERNAL MEDICINE

## 2021-02-23 PROCEDURE — 93010 ELECTROCARDIOGRAM REPORT: CPT | Performed by: INTERNAL MEDICINE

## 2021-02-23 PROCEDURE — 97535 SELF CARE MNGMENT TRAINING: CPT

## 2021-02-23 RX ADMIN — MICONAZOLE NITRATE 1 APPLICATION: 20 CREAM TOPICAL at 22:51

## 2021-02-23 RX ADMIN — Medication 1 TABLET: at 10:24

## 2021-02-23 RX ADMIN — GLYCOPYRROLATE 1 MG: 1 TABLET ORAL at 10:26

## 2021-02-23 RX ADMIN — APIXABAN 10 MG: 5 TABLET, FILM COATED ORAL at 10:25

## 2021-02-23 RX ADMIN — ACETAMINOPHEN 975 MG: 325 TABLET ORAL at 22:50

## 2021-02-23 RX ADMIN — MICAFUNGIN SODIUM 100 MG: 50 INJECTION, POWDER, LYOPHILIZED, FOR SOLUTION INTRAVENOUS at 18:13

## 2021-02-23 RX ADMIN — APIXABAN 10 MG: 5 TABLET, FILM COATED ORAL at 18:09

## 2021-02-23 RX ADMIN — Medication 2000 UNITS: at 10:25

## 2021-02-23 RX ADMIN — INSULIN LISPRO 1 UNITS: 100 INJECTION, SOLUTION INTRAVENOUS; SUBCUTANEOUS at 18:10

## 2021-02-23 RX ADMIN — METHADONE HYDROCHLORIDE 70 MG: 10 TABLET ORAL at 10:25

## 2021-02-23 RX ADMIN — LISINOPRIL 10 MG: 10 TABLET ORAL at 10:25

## 2021-02-23 RX ADMIN — GLYCOPYRROLATE 1 MG: 1 TABLET ORAL at 18:10

## 2021-02-23 RX ADMIN — ACETAMINOPHEN 975 MG: 325 TABLET ORAL at 05:38

## 2021-02-23 RX ADMIN — MICONAZOLE NITRATE 1 APPLICATION: 20 CREAM TOPICAL at 18:10

## 2021-02-23 RX ADMIN — MICONAZOLE NITRATE 1 APPLICATION: 20 CREAM TOPICAL at 10:29

## 2021-02-23 RX ADMIN — FUROSEMIDE 40 MG: 40 TABLET ORAL at 10:25

## 2021-02-23 RX ADMIN — Medication 20 MG: at 05:38

## 2021-02-23 RX ADMIN — GLYCOPYRROLATE 1 MG: 1 TABLET ORAL at 22:51

## 2021-02-23 RX ADMIN — INSULIN GLARGINE 4 UNITS: 100 INJECTION, SOLUTION SUBCUTANEOUS at 22:50

## 2021-02-23 RX ADMIN — ACETAMINOPHEN 975 MG: 325 TABLET ORAL at 13:47

## 2021-02-23 NOTE — PROGRESS NOTES
Progress Note - Infectious Disease   Michelle Arguello 56 y o  male MRN: [id number]  Unit/Bed#: [facility] 726-01 Encounter: [id number]      Impression/Plan:  1  Left-sided candidal empyema-unclear primary process   No clear evidence of an esophageal perforation at least clinically and radiographically   Consideration for the possibility of a pneumonia with colonization of the airways with Candida and a spontaneous pneumothorax contaminating the pleural space   Consideration for the possibility of a transient fungemia which is now resolved   Fortunately the patient remains hemodynamically stable   He has undergone chest tube drainage and is receiving tPA installations   He has clinically improved   Tolerating oral antifungals without difficulty   EKG with prolonged QT in the setting of methadone and fluconazole   Patient has now had chest tube removed  -continue micafungin through 3/12/2022 to complete 4 weeks total treatment  -no additional fluconazole  -may eventually need decortication  -recheck CBC with diff and CMP at least weekly while on treatment  -pulmonary follow-up     2  Fever-suspect all secondary to the fungal empyema   The blood cultures remain negative and the patient remains hemodynamically stable   He seems to be tolerating the antifungals without difficulty   No recurrence   Blood cultures were negative   -antifungals as above  -no additional ID workup for now     3  COVID 19 pneumonia-status post the initiation of the severe treatment protocol   Patient has now completed that treatment  Harpal Virgen did not receive any antivirals such as remdesivir or plasma due to the late phase of illness in which treatment was initiated   Is much improved clinically  -no additional COVID-19 directed treatment  -monitor respiratory status     4  Acute hypoxic respiratory failure-likely multifactorial including COVID-19 pneumonia, and now hydropneumothorax in the setting of presumptive candidal empyema    He is no longer requiring oxygen  -O2 support as needed  -wean off oxygen as able  -no additional ID workup for now     5  Acute right lower extremity ischemia-with suspected rhabdomyolysis   The patient is now status 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  Marliss Nazanin has been continuing on anticoagulation  No local wound cellulitis noted  -no additional directed antibiotics  -vascular follow-up  -local wound care     6  Diabetes mellitus-type 2 with long-term insulin use and hyperglycemia     7  Sacral decubitus ulcer-unstageable  Local wound care    Discussed the above management plan in detail with the primary service    Antibiotics:  Micafungin 7  Antifungals 11    Subjective:  Patient has no fever, chills, sweats; no nausea, vomiting, diarrhea; no cough, shortness of breath; no pain  No new symptoms  He is in good spirits today  Objective:  Vitals:  Temp:  [98 3 °F (36 8 °C)-98 5 °F (36 9 °C)] 98 3 °F (36 8 °C)  HR:  [69-78] 69  Resp:  [16-19] 16  BP: (134-140)/(80-88) 134/80  SpO2:  [95 %-99 %] 96 %  Temp (24hrs), Av 4 °F (36 9 °C), Min:98 3 °F (36 8 °C), Max:98 5 °F (36 9 °C)  Current: Temperature: 98 3 °F (36 8 °C)    Physical Exam:   General Appearance:  Alert, interactive, nontoxic, no acute distress  Throat: Oropharynx moist without lesions  Lungs:   Decreased breath sounds bilaterally; no wheezes, rhonchi or rales; respirations unlabored   Heart:  RRR; no murmur, rub or gallop   Abdomen:   Soft, non-tender, non-distended, positive bowel sounds  Extremities: No clubbing, cyanosis  Right lower extremity AKA site without erythema or drainage   Skin: No new rashes or lesions  No draining wounds noted         Labs, Imaging, & Other studies:   All pertinent labs and imaging studies were personally reviewed  Results from last 7 days   Lab Units 21  0606 21  1100 21  0005 21  0402  21  0605   WBC Thousand/uL 10 14  --   --  10 20*  --  8 87   HEMOGLOBIN g/dL 8  7* 8 5* 8 5* 8 0*   < > 8 7*   PLATELETS Thousands/uL 390  --   --  389  --  387    < > = values in this interval not displayed       Results from last 7 days   Lab Units 02/22/21  0606 02/21/21  0455 02/20/21  0402   SODIUM mmol/L 140 143 141   POTASSIUM mmol/L 3 6 3 8 3 3*   CHLORIDE mmol/L 107 108 105   CO2 mmol/L 30 29 31   BUN mg/dL 13 9 7   CREATININE mg/dL 0 42* 0 50* 0 42*   EGFR ml/min/1 73sq m 125 116 125   CALCIUM mg/dL 8 4 8 0* 7 7*

## 2021-02-23 NOTE — PLAN OF CARE
Problem: OCCUPATIONAL THERAPY ADULT  Goal: Performs self-care activities at highest level of function for planned discharge setting  See evaluation for individualized goals  Description: Treatment Interventions: ADL retraining, Functional transfer training, UE strengthening/ROM, Endurance training, Cognitive reorientation, Patient/family training, Equipment evaluation/education, Neuromuscular reeducation, Fine motor coordination activities, Compensatory technique education, Continued evaluation, Activityengagement          See flowsheet documentation for full assessment, interventions and recommendations  Outcome: Progressing  Note: Limitation: Decreased ADL status, Decreased UE strength, Decreased UE ROM, Decreased Safe judgement during ADL, Decreased endurance, Decreased cognition, Decreased fine motor control, Decreased self-care trans, Decreased high-level ADLs, Decreased sensation  Prognosis: Fair  Assessment: Pt is seen for OT re-evaluation 2* goals expiring  Pt initially presented to [facility] 1/11 with nausea, vomiting, SOB, AMS, and recent COVID-19 dx  Pt noted to be in DKA and transferred to [facility]  During hospitalization, pt with worsening respiratory status and was intubated 1/12  Pt's R LE found to have acute limb ischemia and transferred to [facility] for surgical intervention  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, which was then completed 1/18/21  Pt was extubated 1/25/21  Pt with onset of profound weakness, most overt in R UE   CT head showed questionable hypodensity, but not evidence of acute CVA  R UE x-ray (-) for acute injury  Additional active problems include: L hydropneumothorax, prolonged Q-T interval on ECG, R UE edema and weakness, oropharyngeal dysphagia, anemia, aortic thrombus, acute respiratory failure with hypoxia, DM, chronic methadone use, and HTN    Pt was initially evaluated by OT 1/27/21 and required totalA with ADLs, maxA with bed mobility, and wasn't appropriate for OOB transfers  Pt's R UE 1/5 MMT and L UE 2-/5 MMT grossly upon initial OT evaluation  While improvements are not yet significantly reflected in his functional scores, he has demonstrated significant improvement of functional use of b/l UEs, endurance, activity tolerance, trunk/postural strength, balance and cognition, which are all important in preparation for increased independence with his daily activities  From an OT standpoint, continue to recommend STR upon d/c  Pt is to continue to benefit from skilled occupational therapy services while in the hospital to maximize functioning and independence with daily activities  See below for updated OT goals to be addressed during OT POC  Recommendation: Psych Consult(appears depressed)  OT Discharge Recommendation: Post-Acute Rehabilitation Services  OT - OK to Discharge:  Yes

## 2021-02-23 NOTE — RESTORATIVE TECHNICIAN NOTE
Restorative Specialist Mobility Note       Activity: Dangle, Stand at bedside(Educated/encouraged pt to sit EOB/stand with assistance  Bed alarm on  Pt callbell, phone/tray within reach )     Assistive Device: Other (Comment)(Assist x2)        Repositioned: Other (Comment)(Rep /sat pt upright in bed  Bed alarm on   Pt callbell, phone/tray within reach )        Heels/Feet: Left multi-podus boot  Range of Motion: Active, Left leg        Martha GUALLPA, Restorative Technician, [employer]

## 2021-02-23 NOTE — PROGRESS NOTES
Progress Note Rochelle Liter 1960, 56 y o  male MRN: [id number]    Unit/Bed#: [facility] 726-01 Encounter: [id number]    Primary Care Provider: VERONICA Clifford   Date and time admitted to hospital: 1/13/2021  2:28 PM        Prolonged Q-T interval on ECG  Assessment & Plan  -the patient has prolonged QTC of 487, patient is on methadone and fluconazole  -Patient is on methadone for years  -Patient fluconazole was switched to micafungin per y ID    Right femoral vein DVT (HCC)  Assessment & Plan  Continue therapeutic Lovenox  Eliquis script sent to patient's primary pharmacy for price check      following  If Eliquis in affordable, can switch from subcu Lovenox therapeutic dose to Eliquis while in hospital     Ischemia of right lower extremity with suspected rhabdomyolysis  Assessment & Plan  · Suspected embolic from aortic thrombus  · 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  · Continue therapeutic Lovenox  ; Eliquis scripts sent to patient's pharmacy for price check  · Currently stable from a vascular surgery standpoint    Edema of right upper extremity  Assessment & Plan  · +2 pitting edema also with significant weakness/paresis   · S/p Venous duplex on 01/26 negative for acute or chronic DVT   · Resumed back on lasix with some improvement  · X-ray right humerus negative  Oropharyngeal dysphagia  Assessment & Plan  · Extubated 1/25 and required NGT for nutrition   · Speech therapy following   · S/p barium swallow - no evidence of leakage  · Continue modified diet  · Continue robinul  · S/p ENT consult, no vocal cord motion impairment  · Status post video barium swallow on 02/16  Muscle weakness of right upper extremity  Assessment & Plan  · Profound weakness, predominantly in right upper extremity, especially due to critical illness and steroid use  · During hospitalization, stroke alert was called because of concern for worsening right upper extremity weakness    CT head showed questionable hypodensity but no evidence of acute stroke or bleeding  · Patient currently on therapeutic Lovenox  · Strength currently 2 to 3/5 at baseline  · Evaluated by Neurology  No further inpatient workup required  · Right upper extremity x-ray per family request showed no acute intraosseous abnormality  Anemia  Assessment & Plan  · Secondary to critical illness, no sign of acute blood loss  · Neg stool occult  · Hemoglobin stable around his baseline  · Continue to monitor and transfuse if < 7    Aortic thrombus (HCC)  Assessment & Plan  · With embolic event that compromised right femoral artery  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  S/p R AKA formalization 1/18   · Continue therapeutic lovenox  · F/u with vascular surgery as outpatient    Acute respiratory failure with hypoxia ([facility] [address] )  Assessment & Plan  · Secondary to COVID 19  · Extubated 1/25  · Now resolved and on room air  Type 2 diabetes mellitus, with long-term current use of insulin [facility])  Assessment & Plan  Lab Results   Component Value Date    HGBA1C 11 6 (H) 12/09/2020       Recent Labs     02/22/21  1618 02/22/21  2110 02/23/21  0901 02/23/21  1111   POCGLU 156* 159* 154* 134       Blood Sugar Average: Last 72 hrs:  (P) 156 [id number]     · BBG on the lower side, patient does not eating much due to dysphagia  · Continue Lantus 10 units + SSI  · Discontinue 3 units Humalog t i d  With meals  BMI 28 0-28 9,adult  Assessment & Plan        Methadone maintenance therapy patient [facility])  Assessment & Plan  Chronic methadone use  Methadone was held yesterday due to lethargy yesterday  Restarted methadone today as patient requested due to significant limb pain  And he is on chronic methadone therapy  Will continue to hold gabapentin for now to avoid over sedation and lethargy  Fluconazole discontinued due to prolonged QTC with concomitant methadone fluconazole use      HTN (hypertension)  Assessment & Plan  · Stable  · Continue lisinopril, lasix    * Left Hydropneumothorax  Assessment & Plan  [place] empyema  Status post pigtail  On 21  Now on room air  Pulmonology and thoracic surgery following  ID on board - fluconazole switched to micafungin due to prolonged QTC along with methadone use  PICC line consent obtained and will need PICC line for IV micafungin until 2021  Follow-up in the thoracic office 1 month after the discharge from the hospital with chest x-ray PA and lateral results    Acute encephalopathy-resolved as of 2021  Assessment & Plan  Now resolved  Most likely toxic metabolic due to methadone and gabapentin  Continue to hold gabapentin  Methadone restarted at home dose as patient is on chronic methadone therapy and is requesting for his pain  Avoid any further sedating medications  VTE Pharmacologic Prophylaxis:   Pharmacologic: Apixaban (Eliquis)  Mechanical VTE Prophylaxis in Place: Yes    Patient Centered Rounds: I have performed bedside rounds with nursing staff today  Discussions with Specialists or Other Care Team Provider:      Education and Discussions with Family / Patient: patient     Time Spent for Care: 45 minutes  More than 50% of total time spent on counseling and coordination of care as described above  Current Length of Stay: 41 day(s)    Current Patient Status: Inpatient   Certification Statement: The patient will continue to require additional inpatient hospital stay due to placement    Discharge Plan: pending placement    Code Status: Level 1 - Full Code      Subjective:   Patient needs a placement    Objective:     Vitals:   Temp (24hrs), Av 4 °F (36 9 °C), Min:98 3 °F (36 8 °C), Max:98 5 °F (36 9 °C)    Temp:  [98 3 °F (36 8 °C)-98 5 °F (36 9 °C)] 98 3 °F (36 8 °C)  HR:  [69-78] 69  Resp:  [16-19] 16  BP: (134-140)/(80-88) 134/80  SpO2:  [95 %-99 %] 96 %  Body mass index is 23 48 kg/m²  Input and Output Summary (last 24 hours):        Intake/Output Summary (Last 24 hours) at 2/23/2021 1431  Last data filed at 2/23/2021 0531  Gross per 24 hour   Intake 120 ml   Output 680 ml   Net -560 ml       Physical Exam:     Physical Exam    Additional Data:     Labs:    Results from last 7 days   Lab Units 02/22/21  0606  02/20/21  0402   WBC Thousand/uL 10 14  --  10 20*   HEMOGLOBIN g/dL 8 7*   < > 8 0*   HEMATOCRIT % 29 9*   < > 27 1*   PLATELETS Thousands/uL 390  --  389   NEUTROS PCT %  --   --  78*   LYMPHS PCT %  --   --  11*   MONOS PCT %  --   --  8   EOS PCT %  --   --  1    < > = values in this interval not displayed  Results from last 7 days   Lab Units 02/22/21  0606   POTASSIUM mmol/L 3 6   CHLORIDE mmol/L 107   CO2 mmol/L 30   BUN mg/dL 13   CREATININE mg/dL 0 42*   CALCIUM mg/dL 8 4           * I Have Reviewed All Lab Data Listed Above  * Additional Pertinent Lab Tests Reviewed:  Jaime 64 Admission Reviewed    Imaging:    Imaging Reports Reviewed Today Include:    Imaging Personally Reviewed by Myself Includes:      Recent Cultures (last 7 days):           Last 24 Hours Medication List:   Current Facility-Administered Medications   Medication Dose Route Frequency Provider Last Rate    acetaminophen  975 mg Oral [facility] Gerry Gowers, MD      albumin human  25 g Intravenous Once Jina Duran MD Stopped (02/15/21 2495)    albuterol  2 puff Inhalation Q4H PRN Hayder Rossi DO      apixaban  10 mg Oral BID Gerry Gowers, MD     [facility] [START ON 3/1/2021] apixaban  5 mg Oral BID Gerry Gowers, MD      bisacodyl  10 mg Rectal Daily PRN Macario Downy, CRNP      cholecalciferol  2,000 Units Oral Daily Shannan A Rayoora, CRNP      Diclofenac Sodium  4 g Topical 4x Daily Shannan A Rayoora, CRNP      furosemide  40 mg Oral Daily Hayder Rossi DO      glycopyrrolate  1 mg Oral TID Shannan A Sedora, CRNP      insulin glargine  4 Units Subcutaneous HS Gillian Simmonds, MD      insulin lispro  1-5 Units Subcutaneous TID Christiana Murdock MD  Lidocaine Viscous HCl  15 mL Swish & Spit 4x Daily PRN Shannan Hopkins, VERONICA      lisinopril  10 mg Oral Daily VERONICA Kennedy      menthol-methyl salicylate   Apply externally 4x Daily PRN VERONICA Patel      methadone  70 mg Oral Daily Valerie Grier MD      micafungin  100 mg Intravenous Q24H Valerie Grier MD Stopped (02/22/21 5537)   Reynoso Zaid ANTIFUNGAL  1 application Topical TID Sonia Mccall DO      multivitamin-minerals  1 tablet Oral Daily VERONICA Kennedy      omeprazole (PRILOSEC) suspension 2 mg/mL  20 mg Oral Early Morning VERONICA Kennedy      ondansetron  4 mg Intravenous Q6H PRN Toby Beyer PA-C      phenol  1 spray Mouth/Throat Q2H PRN VERONICA Kennedy      polyethylene glycol  17 g Oral Daily VERONICA Kennedy      senna  1 tablet Oral BID VERONICA Kennedy      sodium chloride  1 spray Each Nare Q1H PRN Ignacio Contreras DO      sodium chloride  1,000 mL Intravenous Once Jaylakaren Rebolledo MD Stopped (02/15/21 0005)        Today, Patient Was Seen By: Jericho Encinas MD    ** Please Note: Dictation voice to text software may have been used in the creation of this document   **

## 2021-02-23 NOTE — PLAN OF CARE
Problem: Prexisting or High Potential for Compromised Skin Integrity  Goal: Skin integrity is maintained or improved  Description: INTERVENTIONS:  - Identify patients at risk for skin breakdown  - Assess and monitor skin integrity  - Assess and monitor nutrition and hydration status  - Monitor labs   - Assess for incontinence   - Turn and reposition patient  - Assist with mobility/ambulation  - Relieve pressure over bony prominences  - Avoid friction and shearing  - Provide appropriate hygiene as needed including keeping skin clean and dry  - Evaluate need for skin moisturizer/barrier cream  - Collaborate with interdisciplinary team   - Patient/family teaching  - Consider wound care consult   Outcome: Progressing     Problem: Potential for Falls  Goal: Patient will remain free of falls  Description: INTERVENTIONS:  - Assess patient frequently for physical needs  -  Identify cognitive and physical deficits and behaviors that affect risk of falls  -  [place] fall precautions as indicated by assessment   - Educate patient/family on patient safety including physical limitations  - Instruct patient to call for assistance with activity based on assessment  - Modify environment to reduce risk of injury  - Consider OT/PT consult to assist with strengthening/mobility  Outcome: Progressing     Problem: Nutrition/Hydration-ADULT  Goal: Nutrient/Hydration intake appropriate for improving, restoring or maintaining nutritional needs  Description: Monitor and assess patient's nutrition/hydration status for malnutrition  Collaborate with interdisciplinary team and initiate plan and interventions as ordered  Monitor patient's weight and dietary intake as ordered or per policy  Utilize nutrition screening tool and intervene as necessary  Determine patient's food preferences and provide high-protein, high-caloric foods as appropriate       INTERVENTIONS:  - Monitor oral intake, urinary output, labs, and treatment plans  - Assess nutrition and hydration status and recommend course of action  - Evaluate amount of meals eaten  - Assist patient with eating if necessary   - Allow adequate time for meals  - Recommend/ encourage appropriate diets, oral nutritional supplements, and vitamin/mineral supplements  - Order, calculate, and assess calorie counts as needed  - Assess need for intravenous fluids  - Provide specific nutrition/hydration education as appropriate  - Include patient/family/caregiver in decisions related to nutrition  Outcome: Progressing     Problem: PAIN - ADULT  Goal: Verbalizes/displays adequate comfort level or baseline comfort level  Description: Interventions:  - Encourage patient to monitor pain and request assistance  - Assess pain using appropriate pain scale  - Administer analgesics based on type and severity of pain and evaluate response  - Implement non-pharmacological measures as appropriate and evaluate response  - Consider cultural and social influences on pain and pain management  - Notify physician/advanced practitioner if interventions unsuccessful or patient reports new pain  Outcome: Progressing     Problem: INFECTION - ADULT  Goal: Absence or prevention of progression during hospitalization  Description: INTERVENTIONS:  - Assess and monitor for signs and symptoms of infection  - Monitor lab/diagnostic results  - Monitor all insertion sites, i e  indwelling lines, tubes, and drains  - [place] appropriate cooling/warming therapies per order  - Administer medications as ordered  - Instruct and encourage patient and family to use good hand hygiene technique  - Identify and instruct in appropriate isolation precautions for identified infection/condition  Outcome: Progressing     Problem: SAFETY ADULT  Goal: Patient will remain free of falls  Description: INTERVENTIONS:  - Assess patient frequently for physical needs  -  Identify cognitive and physical deficits and behaviors that affect risk of falls    - [place] fall precautions as indicated by assessment   - Educate patient/family on patient safety including physical limitations  - Instruct patient to call for assistance with activity based on assessment  - Modify environment to reduce risk of injury  - Consider OT/PT consult to assist with strengthening/mobility  Outcome: Progressing  Goal: Maintain or return to baseline ADL function  Description: INTERVENTIONS:  -  Assess patient's ability to carry out ADLs; assess patient's baseline for ADL function and identify physical deficits which impact ability to perform ADLs (bathing, care of mouth/teeth, toileting, grooming, dressing, etc )  - Assess/evaluate cause of self-care deficits   - Assess range of motion  - Assess patient's mobility; develop plan if impaired  - Assess patient's need for assistive devices and provide as appropriate  - Encourage maximum independence but intervene and supervise when necessary  - Involve family in performance of ADLs  - Assess for home care needs following discharge   - Consider OT consult to assist with ADL evaluation and planning for discharge  - Provide patient education as appropriate  Outcome: Progressing  Goal: Maintain or return mobility status to optimal level  Description: INTERVENTIONS:  - Assess patient's baseline mobility status (ambulation, transfers, stairs, etc )    - Identify cognitive and physical deficits and behaviors that affect mobility  - Identify mobility aids required to assist with transfers and/or ambulation (gait belt, sit-to-stand, lift, walker, cane, etc )  - [place] fall precautions as indicated by assessment  - Record patient progress and toleration of activity level on Mobility SBAR; progress patient to next Phase/Stage  - Instruct patient to call for assistance with activity based on assessment  - Consider rehabilitation consult to assist with strengthening/weightbearing, etc   Outcome: Progressing     Problem: DISCHARGE PLANNING  Goal: Discharge to home or other facility with appropriate resources  Description: INTERVENTIONS:  - Identify barriers to discharge w/patient and caregiver  - Arrange for needed discharge resources and transportation as appropriate  - Identify discharge learning needs (meds, wound care, etc )  - Arrange for interpretive services to assist at discharge as needed  - Refer to Case Management Department for coordinating discharge planning if the patient needs post-hospital services based on physician/advanced practitioner order or complex needs related to functional status, cognitive ability, or social support system  Outcome: Progressing     Problem: Knowledge Deficit  Goal: Patient/family/caregiver demonstrates understanding of disease process, treatment plan, medications, and discharge instructions  Description: Complete learning assessment and assess knowledge base    Interventions:  - Provide teaching at level of understanding  - Provide teaching via preferred learning methods  Outcome: Progressing

## 2021-02-23 NOTE — OCCUPATIONAL THERAPY NOTE
Occupational Therapy Re-Evaluation     Patient Name: Amy Aj  CVDPX'[id number] Date: 2/23/2021  Problem List  Principal Problem:    Left Hydropneumothorax  Active Problems:    HTN (hypertension)    Methadone maintenance therapy patient ([facility] )    BMI 28 0-28 9,adult    Type 2 diabetes mellitus, with long-term current use of insulin (HCC)    Acute respiratory failure with hypoxia (HCC)    Aortic thrombus (HCC)    Anemia    Muscle weakness of right upper extremity    Oropharyngeal dysphagia    Edema of right upper extremity    Ischemia of right lower extremity with suspected rhabdomyolysis    Right femoral vein DVT ([facility])    RUE weakness    Prolonged Q-T interval on ECG    Past Medical History  Past Medical History:   Diagnosis Date    Diabetes mellitus ([place] [id number] )     GERD (gastroesophageal reflux disease)     Hypercholesteremia     Hypertension     Methadone use ([place] [address] )      Past Surgical History  Past Surgical History:   Procedure Laterality Date    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    Procedure: AMPUTATION ABOVE KNEE (AKA);   Surgeon: Claudia Schofield MD;  [facility];  Service: Vascular    AMPUTATION ABOVE KNEE (AKA) Right 1/18/2021    Procedure: AMPUTATION ABOVE KNEE (AKA) FORMALIZATION,  R AKA wound washout, wound closure;  Surgeon: Claudia Schofield MD;  [facility];  Service: Vascular    ARTERIOGRAM Right 1/13/2021    Procedure: ARTERIOGRAM;  Surgeon: Mare Torrez DO;  [facility];  Service: Vascular    IR CHEST TUBE PLACEMENT  2/10/2021    IR LOWER EXTREMITY ANGIOGRAM  1/14/2021    THROMBECTOMY W/ EMBOLECTOMY Right 1/13/2021    Procedure: EMBOLECTOMY/THROMBECTOMY LOWER EXTREMITY;  Surgeon: Mare Torrez DO;  [facility];  Service: Vascular        02/23/21 1140   OT Last Visit   OT Visit Date 02/23/21   Note Type   Note type Re-Evaluation  (and treatment session)   Restrictions/Precautions   Weight Bearing Precautions Per Order Yes   RLE Weight Bearing Per Order NWB  (recent AKA)   Other Precautions Cognitive;Aspiration;Multiple lines;Telemetry; Fall Risk;Pain;WBS   Pain Assessment   Pain Assessment Tool FLACC   Pain Rating: FLACC (Rest) - Face 0   Pain Rating: FLACC (Rest) - Legs 0   Pain Rating: FLACC (Rest) - Activity 0   Pain Rating: FLACC (Rest) - Cry 0   Pain Rating: FLACC (Rest) - Consolability 0   Score: FLACC (Rest) 0   Pain Rating: FLACC (Activity) - Face 0   Pain Rating: FLACC (Activity) - Legs 0   Pain Rating: FLACC (Activity) - Activity 0   Pain Rating: FLACC (Activity) - Cry 0   Pain Rating: FLACC (Activity) - Consolability 0   Score: FLACC (Activity) 0   Home Living   Additional Comments see initial evaluation   Prior Function   Level of [place] Independent with ADLs and functional mobility   Lives With Family   Receives Help From Family   ADL Assistance Independent   IADLs Independent   Lifestyle   Autonomy I ADLS/IADLS, transfers and functional mobility PTA   Reciprocal Relationships Pt lives w/ his siblings and nephews   Service to Others Pt works full time   Intrinsic Gratification Unable to report @ this time   Psychosocial   Psychosocial (WDL) X   Patient Behaviors/Mood [place] affect   Subjective   Subjective "Don't be mad but I am so tired today"   ADL   Eating Assistance 4  Minimal Assistance   Eating Deficit Increased time to complete;Setup; Adaptive utensils (Comment)  (builtup handles)   Grooming Assistance 3  Moderate Assistance   UB Bathing Assistance 3  Moderate Assistance   LB Bathing Assistance 2  Maximal [address] 3  Moderate [address] 1  Total Assistance   Toileting Assistance  1  Total Assistance   Bed Mobility   Supine to Sit 4  Minimal assistance   Additional items Assist x 1; Increased time required;Verbal cues   Sit to Supine 5  Supervision   Additional Comments Pt requires Nora for trunk mgmt and VCs to transition from supine to sitting EOB with increased time    Able to return to supine with supervision  Able to sit short periods of time with supervision  No lateral lean  Difficulty maintaining neutral head/neck positioning for more than ~10 seconds (chin down)   Balance   Static Sitting Fair -   Dynamic Sitting Poor +   Activity Tolerance   Activity Tolerance Patient limited by fatigue   Medical Staff Made Aware Spoke with PT and CM   Nurse Made Aware yes   RUE Assessment   RUE Assessment   (4-/5 grossly, weak grasp radially>ulnar)   LUE Assessment   LUE Assessment   (4/5 grossly)   Hand Function   Gross Motor Coordination Impaired  (R>L)   Fine Motor Coordination   (R>L)   Sensation   Light Touch Partial deficits in the RUE   Additional Comments pt perceives light touch deficits more severe ulnar>radial   Vision - Complex Assessment   Ocular Range of Motion WFL   Head Position Chin down   Tracking Able to track stimulus in all quads without difficulty   Perception   Inattention/Neglect Appears intact   Cognition   Arousal/Participation Alert; Responsive; Cooperative   Attention Attends with cues to redirect   Orientation Level Oriented X4   Memory Decreased recall of precautions   Following Commands Follows one step commands with increased time or repetition   Comments Pt is pleasant, presents with flat and depressed affect  Frustrated with situation but is motivated to be independent   Assessment   Limitation Decreased ADL status; Decreased UE strength;Decreased UE ROM; Decreased Safe judgement during ADL;Decreased endurance;Decreased cognition;Decreased fine motor control;Decreased self-care trans;Decreased high-level ADLs; Decreased sensation   Prognosis Fair   Assessment Pt is seen for OT re-evaluation 2* goals expiring  Pt initially [facility] with nausea, vomiting, SOB, AMS, and recent COVID-19 dx  Pt noted to be in DKA and transferred to 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, which was then completed 1/18/21  Pt was extubated 1/25/21  Pt with onset of profound weakness, most overt in R UE   CT head showed questionable hypodensity, but not evidence of acute CVA  R UE x-ray (-) for acute injury  Additional active problems include: L hydropneumothorax, prolonged Q-T interval on ECG, R UE edema and weakness, oropharyngeal dysphagia, anemia, aortic thrombus, acute respiratory failure with hypoxia, DM, chronic methadone use, and HTN  Pt was initially evaluated by OT 1/27/21 and required totalA with ADLs, maxA with bed mobility, and wasn't appropriate for OOB transfers  Pt's R UE 1/5 MMT and L UE 2-/5 MMT grossly upon initial OT evaluation  While improvements are not yet significantly reflected in his functional scores, he has demonstrated significant improvement of functional use of b/l UEs, endurance, activity tolerance, trunk/postural strength, balance and cognition, which are all important in preparation for increased independence with his daily activities  From an OT standpoint, continue to recommend STR upon d/c  Pt is to continue to benefit from skilled occupational therapy services while in the hospital to maximize functioning and independence with daily activities  See below for updated OT goals to be addressed during OT POC  Goals   Patient Goals to be independent   Plan   Treatment Interventions ADL retraining;Functional transfer training;UE strengthening/ROM; Endurance training;Cognitive reorientation;Patient/family training;Equipment evaluation/education; Neuromuscular reeducation; Fine motor coordination activities; Compensatory technique education;Continued evaluation; Activityengagement   Goal Expiration Date 03/23/21   OT Treatment Day 11   OT Frequency 3-5x/wk   Additional Treatment Session   Start Time 1125   End Time 1140   Treatment Assessment Pt is seen for OT treatment session following re-evaluation including interventions to: increase independence with bed mobility, improve eating with use of R UE, increase R UE strength, improve sitting tolerance/balance and educate pt on desensitization strategies for residual limb  In comparison to previous sessions, pt with decreased sitting tolerance, reporting he is more fatigued today  Yesterday tolerated sitting EOB with Nora for 14 5 min, today only 8 5 minutes but with no physical assist   Pt completed ball pass activity with 8" diameter ball while seated EOB with close supervision  Pt retrieved ball presented in various planes with use of b/l UEs and handed back to therapist with increased time  Demonstrates improved functional use of R UE throughout  Fatigues after 8x  Pt reporting that he has been experiencing phantom sensation/pain  Pt educated on desensitization techniques and pt demonstrates good carry over of education  Pt admits to primarily using L UE for eating despite built-up handles on utensils 2* frustration with increased time and coordination  Educated pt on the importance of functional use of R UE to continue to increase R UE strength and functional use  Pt practiced eating ~5 bites of food with use of R UE and builtup utensils while in long sit in bed with HOB elevated (initially attempted EOB, but pt too fatigued)  Pt able to grasp builtup utensils and bring to mouth, but requires significantly increased time and demonstrates difficulty maintaining effective grasp for more than ~15 seconds  Pt educated on using L UE to provide stabilization and active assist as needed  Pt is receptive and frustrated that he was not able to participate as much during today's session  Pt continues to have surgical staples in L AKA incision    Plan to begin residual limb wrapping and education following their removal    Additional Treatment Day 2   Recommendation   Recommendation Psych Consult  (appears depressed)   OT Discharge Recommendation Post-Acute Rehabilitation Services   OT - OK to Discharge Yes   AM-PAC Daily Activity Inpatient   Lower Body Dressing 1   Bathing 2   Toileting 1   Upper Body Dressing 2   Grooming 3   Eating 3   Daily Activity Raw Score 12   Daily Activity Standardized Score (Calc for Raw Score >=11) 30 6   Turning Head Towards Sound 4   Follow Simple Instructions 3   Low Function Daily Activity Raw Score 19   Low Function Daily Activity Standardized Score 31 34   AM-PAC Applied Cognition Inpatient   Following a Speech/Presentation 3   Understanding Ordinary Conversation 3   Taking Medications 2   Remembering Where Things Are Placed or Put Away 2   Remembering List of 4-5 Errands 2   Taking Care of Complicated Tasks 2   Applied Cognition Raw Score 14   Applied Cognition Standardized Score 32 02   Barthel Index   Feeding 5   Bathing 0   Grooming Score 0   Dressing Score 5   Bladder Score 0   Bowels Score 0   Toilet Use Score 5   Transfers (Bed/Chair) Score 5   Mobility (Level Surface) Score 0   Stairs Score 0   Barthel Index Score 20     Pt will maintain sitting I'ly for at least 30 minutes while completing a dynamic functional activity with good balance and endurance  Pt will complete eating and grooming tasks with set-up with use of AD/DME/one handed compensatory strategies as appropriate  Pt will complete UB dressing and bathing with set-up with use of AD/DME/one handed compensatory strategies as appropriate  Pt will complete LB dressing and bathing with Nora with use of AD/DME/one handed compensatory strategies as appropriate  Pt will complete bed mobility with Marc in preparation for completing daily activities while seated  Pt will complete functional transfers on/off all surfaces with Nora with use of DME as appropriate and with good safety/balance      Pt will improve b/l UE strength to [facility] via AROM/AAROM in order to facilitate increased independence with self care tasks  Pt will demonstrate G carryover of pt/caregiver education and training as appropriate w/o cues w/ good tolerance to increase safety during functional tasks    Pt will engage in depression screen/leisure interest checklist w/ mod I and G participation to monitor s/s depression and ID 3 positive coping strategies to A w/ emotional regulation and management  Pt will be independent with desensitization techniques to improve phantom pain and increase tolerance for pre-prosthetic training    Sal Manriquez, ELDER, OTR/L

## 2021-02-23 NOTE — PLAN OF CARE
Problem: Prexisting or High Potential for Compromised Skin Integrity  Goal: Skin integrity is maintained or improved  Description: INTERVENTIONS:  - Identify patients at risk for skin breakdown  - Assess and monitor skin integrity  - Assess and monitor nutrition and hydration status  - Monitor labs   - Assess for incontinence   - Turn and reposition patient  - Assist with mobility/ambulation  - Relieve pressure over bony prominences  - Avoid friction and shearing  - Provide appropriate hygiene as needed including keeping skin clean and dry  - Evaluate need for skin moisturizer/barrier cream  - Collaborate with interdisciplinary team   - Patient/family teaching  - Consider wound care consult   Outcome: Progressing     Problem: Potential for Falls  Goal: Patient will remain free of falls  Description: INTERVENTIONS:  - Assess patient frequently for physical needs  -  Identify cognitive and physical deficits and behaviors that affect risk of falls  -  [place] fall precautions as indicated by assessment   - Educate patient/family on patient safety including physical limitations  - Instruct patient to call for assistance with activity based on assessment  - Modify environment to reduce risk of injury  - Consider OT/PT consult to assist with strengthening/mobility  Outcome: Progressing     Problem: Nutrition/Hydration-ADULT  Goal: Nutrient/Hydration intake appropriate for improving, restoring or maintaining nutritional needs  Description: Monitor and assess patient's nutrition/hydration status for malnutrition  Collaborate with interdisciplinary team and initiate plan and interventions as ordered  Monitor patient's weight and dietary intake as ordered or per policy  Utilize nutrition screening tool and intervene as necessary  Determine patient's food preferences and provide high-protein, high-caloric foods as appropriate       INTERVENTIONS:  - Monitor oral intake, urinary output, labs, and treatment plans  - Assess nutrition and hydration status and recommend course of action  - Evaluate amount of meals eaten  - Assist patient with eating if necessary   - Allow adequate time for meals  - Recommend/ encourage appropriate diets, oral nutritional supplements, and vitamin/mineral supplements  - Order, calculate, and assess calorie counts as needed  - Assess need for intravenous fluids  - Provide specific nutrition/hydration education as appropriate  - Include patient/family/caregiver in decisions related to nutrition  Outcome: Progressing     Problem: PAIN - ADULT  Goal: Verbalizes/displays adequate comfort level or baseline comfort level  Description: Interventions:  - Encourage patient to monitor pain and request assistance  - Assess pain using appropriate pain scale  - Administer analgesics based on type and severity of pain and evaluate response  - Implement non-pharmacological measures as appropriate and evaluate response  - Consider cultural and social influences on pain and pain management  - Notify physician/advanced practitioner if interventions unsuccessful or patient reports new pain  Outcome: Progressing     Problem: INFECTION - ADULT  Goal: Absence or prevention of progression during hospitalization  Description: INTERVENTIONS:  - Assess and monitor for signs and symptoms of infection  - Monitor lab/diagnostic results  - Monitor all insertion sites, i e  indwelling lines, tubes, and drains  - [place] appropriate cooling/warming therapies per order  - Administer medications as ordered  - Instruct and encourage patient and family to use good hand hygiene technique  - Identify and instruct in appropriate isolation precautions for identified infection/condition  Outcome: Progressing     Problem: SAFETY ADULT  Goal: Patient will remain free of falls  Description: INTERVENTIONS:  - Assess patient frequently for physical needs  -  Identify cognitive and physical deficits and behaviors that affect risk of falls    - [place] fall precautions as indicated by assessment   - Educate patient/family on patient safety including physical limitations  - Instruct patient to call for assistance with activity based on assessment  - Modify environment to reduce risk of injury  - Consider OT/PT consult to assist with strengthening/mobility  Outcome: Progressing  Goal: Maintain or return to baseline ADL function  Description: INTERVENTIONS:  -  Assess patient's ability to carry out ADLs; assess patient's baseline for ADL function and identify physical deficits which impact ability to perform ADLs (bathing, care of mouth/teeth, toileting, grooming, dressing, etc )  - Assess/evaluate cause of self-care deficits   - Assess range of motion  - Assess patient's mobility; develop plan if impaired  - Assess patient's need for assistive devices and provide as appropriate  - Encourage maximum independence but intervene and supervise when necessary  - Involve family in performance of ADLs  - Assess for home care needs following discharge   - Consider OT consult to assist with ADL evaluation and planning for discharge  - Provide patient education as appropriate  Outcome: Progressing  Goal: Maintain or return mobility status to optimal level  Description: INTERVENTIONS:  - Assess patient's baseline mobility status (ambulation, transfers, stairs, etc )    - Identify cognitive and physical deficits and behaviors that affect mobility  - Identify mobility aids required to assist with transfers and/or ambulation (gait belt, sit-to-stand, lift, walker, cane, etc )  - [place] fall precautions as indicated by assessment  - Record patient progress and toleration of activity level on Mobility SBAR; progress patient to next Phase/Stage  - Instruct patient to call for assistance with activity based on assessment  - Consider rehabilitation consult to assist with strengthening/weightbearing, etc   Outcome: Progressing     Problem: DISCHARGE PLANNING  Goal: Discharge to home or other facility with appropriate resources  Description: INTERVENTIONS:  - Identify barriers to discharge w/patient and caregiver  - Arrange for needed discharge resources and transportation as appropriate  - Identify discharge learning needs (meds, wound care, etc )  - Arrange for interpretive services to assist at discharge as needed  - Refer to Case Management Department for coordinating discharge planning if the patient needs post-hospital services based on physician/advanced practitioner order or complex needs related to functional status, cognitive ability, or social support system  Outcome: Progressing     Problem: Knowledge Deficit  Goal: Patient/family/caregiver demonstrates understanding of disease process, treatment plan, medications, and discharge instructions  Description: Complete learning assessment and assess knowledge base    Interventions:  - Provide teaching at level of understanding  - Provide teaching via preferred learning methods  Outcome: Progressing

## 2021-02-23 NOTE — ASSESSMENT & PLAN NOTE
Lab Results   Component Value Date    HGBA1C 11 6 (H) 12/09/2020       Recent Labs     02/22/21  1618 02/22/21  2110 02/23/21  0901 02/23/21  1111   POCGLU 156* 159* 154* 134       Blood Sugar Average: Last 72 hrs:  (P) 156 [id number]     · BBG on the lower side, patient does not eating much due to dysphagia  · Continue Lantus 10 units + SSI  · Discontinue 3 units Humalog t i d  With meals

## 2021-02-23 NOTE — PLAN OF CARE
Problem: Prexisting or High Potential for Compromised Skin Integrity  Goal: Skin integrity is maintained or improved  Description: INTERVENTIONS:  - Identify patients at risk for skin breakdown  - Assess and monitor skin integrity  - Assess and monitor nutrition and hydration status  - Monitor labs   - Assess for incontinence   - Turn and reposition patient  - Assist with mobility/ambulation  - Relieve pressure over bony prominences  - Avoid friction and shearing  - Provide appropriate hygiene as needed including keeping skin clean and dry  - Evaluate need for skin moisturizer/barrier cream  - Collaborate with interdisciplinary team   - Patient/family teaching  - Consider wound care consult   Outcome: Progressing     Problem: Potential for Falls  Goal: Patient will remain free of falls  Description: INTERVENTIONS:  - Assess patient frequently for physical needs  -  Identify cognitive and physical deficits and behaviors that affect risk of falls  -  [place] fall precautions as indicated by assessment   - Educate patient/family on patient safety including physical limitations  - Instruct patient to call for assistance with activity based on assessment  - Modify environment to reduce risk of injury  - Consider OT/PT consult to assist with strengthening/mobility  Outcome: Progressing     Problem: Nutrition/Hydration-ADULT  Goal: Nutrient/Hydration intake appropriate for improving, restoring or maintaining nutritional needs  Description: Monitor and assess patient's nutrition/hydration status for malnutrition  Collaborate with interdisciplinary team and initiate plan and interventions as ordered  Monitor patient's weight and dietary intake as ordered or per policy  Utilize nutrition screening tool and intervene as necessary  Determine patient's food preferences and provide high-protein, high-caloric foods as appropriate       INTERVENTIONS:  - Monitor oral intake, urinary output, labs, and treatment plans  - Assess nutrition and hydration status and recommend course of action  - Evaluate amount of meals eaten  - Assist patient with eating if necessary   - Allow adequate time for meals  - Recommend/ encourage appropriate diets, oral nutritional supplements, and vitamin/mineral supplements  - Order, calculate, and assess calorie counts as needed  - Assess need for intravenous fluids  - Provide specific nutrition/hydration education as appropriate  - Include patient/family/caregiver in decisions related to nutrition  Outcome: Progressing     Problem: PAIN - ADULT  Goal: Verbalizes/displays adequate comfort level or baseline comfort level  Description: Interventions:  - Encourage patient to monitor pain and request assistance  - Assess pain using appropriate pain scale  - Administer analgesics based on type and severity of pain and evaluate response  - Implement non-pharmacological measures as appropriate and evaluate response  - Consider cultural and social influences on pain and pain management  - Notify physician/advanced practitioner if interventions unsuccessful or patient reports new pain  Outcome: Progressing     Problem: INFECTION - ADULT  Goal: Absence or prevention of progression during hospitalization  Description: INTERVENTIONS:  - Assess and monitor for signs and symptoms of infection  - Monitor lab/diagnostic results  - Monitor all insertion sites, i e  indwelling lines, tubes, and drains  - [place] appropriate cooling/warming therapies per order  - Administer medications as ordered  - Instruct and encourage patient and family to use good hand hygiene technique  - Identify and instruct in appropriate isolation precautions for identified infection/condition  Outcome: Progressing     Problem: SAFETY ADULT  Goal: Patient will remain free of falls  Description: INTERVENTIONS:  - Assess patient frequently for physical needs  -  Identify cognitive and physical deficits and behaviors that affect risk of falls    - [place] fall precautions as indicated by assessment   - Educate patient/family on patient safety including physical limitations  - Instruct patient to call for assistance with activity based on assessment  - Modify environment to reduce risk of injury  - Consider OT/PT consult to assist with strengthening/mobility  Outcome: Progressing  Goal: Maintain or return to baseline ADL function  Description: INTERVENTIONS:  -  Assess patient's ability to carry out ADLs; assess patient's baseline for ADL function and identify physical deficits which impact ability to perform ADLs (bathing, care of mouth/teeth, toileting, grooming, dressing, etc )  - Assess/evaluate cause of self-care deficits   - Assess range of motion  - Assess patient's mobility; develop plan if impaired  - Assess patient's need for assistive devices and provide as appropriate  - Encourage maximum independence but intervene and supervise when necessary  - Involve family in performance of ADLs  - Assess for home care needs following discharge   - Consider OT consult to assist with ADL evaluation and planning for discharge  - Provide patient education as appropriate  Outcome: Progressing  Goal: Maintain or return mobility status to optimal level  Description: INTERVENTIONS:  - Assess patient's baseline mobility status (ambulation, transfers, stairs, etc )    - Identify cognitive and physical deficits and behaviors that affect mobility  - Identify mobility aids required to assist with transfers and/or ambulation (gait belt, sit-to-stand, lift, walker, cane, etc )  - [place] fall precautions as indicated by assessment  - Record patient progress and toleration of activity level on Mobility SBAR; progress patient to next Phase/Stage  - Instruct patient to call for assistance with activity based on assessment  - Consider rehabilitation consult to assist with strengthening/weightbearing, etc   Outcome: Progressing     Problem: DISCHARGE PLANNING  Goal: Discharge to home or other facility with appropriate resources  Description: INTERVENTIONS:  - Identify barriers to discharge w/patient and caregiver  - Arrange for needed discharge resources and transportation as appropriate  - Identify discharge learning needs (meds, wound care, etc )  - Arrange for interpretive services to assist at discharge as needed  - Refer to Case Management Department for coordinating discharge planning if the patient needs post-hospital services based on physician/advanced practitioner order or complex needs related to functional status, cognitive ability, or social support system  Outcome: Progressing     Problem: Knowledge Deficit  Goal: Patient/family/caregiver demonstrates understanding of disease process, treatment plan, medications, and discharge instructions  Description: Complete learning assessment and assess knowledge base    Interventions:  - Provide teaching at level of understanding  - Provide teaching via preferred learning methods  Outcome: Progressing

## 2021-02-24 LAB
ATRIAL RATE: 72 BPM
GLUCOSE SERPL-MCNC: 112 MG/DL (ref 65–140)
GLUCOSE SERPL-MCNC: 141 MG/DL (ref 65–140)
GLUCOSE SERPL-MCNC: 154 MG/DL (ref 65–140)
GLUCOSE SERPL-MCNC: 154 MG/DL (ref 65–140)
P AXIS: 33 DEGREES
PR INTERVAL: 124 MS
QRS AXIS: 47 DEGREES
QRSD INTERVAL: 78 MS
QT INTERVAL: 432 MS
QTC INTERVAL: 473 MS
T WAVE AXIS: 109 DEGREES
VENTRICULAR RATE: 72 BPM

## 2021-02-24 PROCEDURE — 97110 THERAPEUTIC EXERCISES: CPT

## 2021-02-24 PROCEDURE — 99232 SBSQ HOSP IP/OBS MODERATE 35: CPT | Performed by: INTERNAL MEDICINE

## 2021-02-24 PROCEDURE — 97530 THERAPEUTIC ACTIVITIES: CPT

## 2021-02-24 PROCEDURE — 93010 ELECTROCARDIOGRAM REPORT: CPT | Performed by: INTERNAL MEDICINE

## 2021-02-24 PROCEDURE — 99233 SBSQ HOSP IP/OBS HIGH 50: CPT | Performed by: INTERNAL MEDICINE

## 2021-02-24 PROCEDURE — 93005 ELECTROCARDIOGRAM TRACING: CPT

## 2021-02-24 PROCEDURE — 82948 REAGENT STRIP/BLOOD GLUCOSE: CPT

## 2021-02-24 RX ADMIN — MICONAZOLE NITRATE 1 APPLICATION: 20 CREAM TOPICAL at 09:54

## 2021-02-24 RX ADMIN — GLYCOPYRROLATE 1 MG: 1 TABLET ORAL at 20:49

## 2021-02-24 RX ADMIN — GLYCOPYRROLATE 1 MG: 1 TABLET ORAL at 17:37

## 2021-02-24 RX ADMIN — METHADONE HYDROCHLORIDE 70 MG: 10 TABLET ORAL at 09:52

## 2021-02-24 RX ADMIN — ACETAMINOPHEN 975 MG: 325 TABLET ORAL at 14:15

## 2021-02-24 RX ADMIN — APIXABAN 10 MG: 5 TABLET, FILM COATED ORAL at 17:37

## 2021-02-24 RX ADMIN — ACETAMINOPHEN 975 MG: 325 TABLET ORAL at 21:04

## 2021-02-24 RX ADMIN — Medication 1 TABLET: at 09:52

## 2021-02-24 RX ADMIN — LISINOPRIL 10 MG: 10 TABLET ORAL at 09:52

## 2021-02-24 RX ADMIN — Medication 2000 UNITS: at 09:52

## 2021-02-24 RX ADMIN — APIXABAN 10 MG: 5 TABLET, FILM COATED ORAL at 09:52

## 2021-02-24 RX ADMIN — INSULIN GLARGINE 4 UNITS: 100 INJECTION, SOLUTION SUBCUTANEOUS at 21:04

## 2021-02-24 RX ADMIN — MICONAZOLE NITRATE 1 APPLICATION: 20 CREAM TOPICAL at 17:37

## 2021-02-24 RX ADMIN — Medication 20 MG: at 05:29

## 2021-02-24 RX ADMIN — FUROSEMIDE 40 MG: 40 TABLET ORAL at 09:52

## 2021-02-24 RX ADMIN — GLYCOPYRROLATE 1 MG: 1 TABLET ORAL at 09:53

## 2021-02-24 RX ADMIN — ACETAMINOPHEN 975 MG: 325 TABLET ORAL at 05:28

## 2021-02-24 RX ADMIN — MICAFUNGIN SODIUM 100 MG: 50 INJECTION, POWDER, LYOPHILIZED, FOR SOLUTION INTRAVENOUS at 17:45

## 2021-02-24 RX ADMIN — INSULIN LISPRO 1 UNITS: 100 INJECTION, SOLUTION INTRAVENOUS; SUBCUTANEOUS at 17:37

## 2021-02-24 RX ADMIN — MICONAZOLE NITRATE 1 APPLICATION: 20 CREAM TOPICAL at 20:49

## 2021-02-24 NOTE — PROGRESS NOTES
Progress Note - Infectious Disease   Criselda Maguire 56 y o  male MRN: [id number]  Unit/Bed#: [facility] 726-01 Encounter: [id number]      Impression/Plan:  1  Left-sided candidal empyema-unclear primary process   No clear evidence of an esophageal perforation at least clinically and radiographically   Consideration for the possibility of a pneumonia with colonization of the airways with Candida and a spontaneous pneumothorax contaminating the pleural space   Consideration for the possibility of a transient fungemia which is now resolved   Fortunately the patient remains hemodynamically stable   He has undergone chest tube drainage and is receiving tPA installations   He has clinically improved   Tolerating antifungals without difficulty   EKG with prolonged QT in the setting of methadone and fluconazole so need to change back to micafungin   Patient has now had chest tube removed  -continue micafungin through 3/12/2022 to complete 4 weeks total treatment  -no additional fluconazole  -may eventually need decortication  -recheck CBC with diff and CMP at least weekly while on treatment  -pulmonary follow-up     2  COVID 19 pneumonia-status post the initiation of the severe treatment protocol   Patient has now completed that treatment  [facility] did not receive any antivirals such as remdesivir or plasma due to the late phase of illness in which treatment was initiated   Is much improved clinically  -no additional COVID-19 directed treatment  -monitor respiratory status     3  Acute right lower extremity ischemia-with suspected rhabdomyolysis   The patient is now status 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  [facility] has been continuing on anticoagulation  No local wound cellulitis noted  -no additional directed antibiotics  -vascular follow-up  -local wound care     4  Diabetes mellitus-type 2 with long-term insulin use and hyperglycemia     5   Sacral decubitus ulcer-unstageable   Local wound care    I spent 35 minutes on the unit floor of which 20 minutes was in counseling/coordination of care as outlined in my note including coordinating outpatient intravenous antifungals and laboratory follow-up and follow up with infectious diseases    Possibly to rehab soon  Antibiotics:  Micafungin 8  Antifungals 12    Subjective:  Patient has no fever, chills, sweats; no nausea, vomiting, diarrhea; no cough, shortness of breath; no pain  No new symptoms  Objective:  Vitals:  Temp:  [98 2 °F (36 8 °C)-98 7 °F (37 1 °C)] 98 2 °F (36 8 °C)  HR:  [76-80] 80  Resp:  [16-22] 16  BP: (129-144)/(79-80) 144/80  SpO2:  [96 %-97 %] 96 %  Temp (24hrs), Av 5 °F (36 9 °C), Min:98 2 °F (36 8 °C), Max:98 7 °F (37 1 °C)  Current: Temperature: 98 2 °F (36 8 °C)    Physical Exam:   General Appearance:  Alert, interactive, nontoxic, no acute distress  Throat: Oropharynx moist without lesions  Lungs:   Clear to auscultation bilaterally; no wheezes, rhonchi or rales; respirations unlabored   Heart:  RRR; no murmur, rub or gallop   Abdomen:   Soft, non-tender, non-distended, positive bowel sounds  Extremities: No clubbing, cyanosis or edema   Skin: No new rashes or lesions  No draining wounds noted  Labs, Imaging, & Other studies:   All pertinent labs and imaging studies were personally reviewed  Results from last 7 days   Lab Units 21  0606 21  1100 21  0005 21  0402  21  0605   WBC Thousand/uL 10 14  --   --  10 20*  --  8 87   HEMOGLOBIN g/dL 8 7* 8 5* 8 5* 8 0*   < > 8 7*   PLATELETS Thousands/uL 390  --   --  389  --  387    < > = values in this interval not displayed       Results from last 7 days   Lab Units 21  0606 21  0455 21  0402   SODIUM mmol/L 140 143 141   POTASSIUM mmol/L 3 6 3 8 3 3*   CHLORIDE mmol/L 107 108 105   CO2 mmol/L 30 29 31   BUN mg/dL 13 9 7   CREATININE mg/dL 0 42* 0 50* 0 42*   EGFR ml/min/1 73sq m 125 116 125   CALCIUM mg/dL 8 4 8 0* 7 7*

## 2021-02-24 NOTE — PLAN OF CARE
Problem: Prexisting or High Potential for Compromised Skin Integrity  Goal: Skin integrity is maintained or improved  Description: INTERVENTIONS:  - Identify patients at risk for skin breakdown  - Assess and monitor skin integrity  - Assess and monitor nutrition and hydration status  - Monitor labs   - Assess for incontinence   - Turn and reposition patient  - Assist with mobility/ambulation  - Relieve pressure over bony prominences  - Avoid friction and shearing  - Provide appropriate hygiene as needed including keeping skin clean and dry  - Evaluate need for skin moisturizer/barrier cream  - Collaborate with interdisciplinary team   - Patient/family teaching  - Consider wound care consult   Outcome: Progressing     Problem: Potential for Falls  Goal: Patient will remain free of falls  Description: INTERVENTIONS:  - Assess patient frequently for physical needs  -  Identify cognitive and physical deficits and behaviors that affect risk of falls  -  [place] fall precautions as indicated by assessment   - Educate patient/family on patient safety including physical limitations  - Instruct patient to call for assistance with activity based on assessment  - Modify environment to reduce risk of injury  - Consider OT/PT consult to assist with strengthening/mobility  Outcome: Progressing     Problem: Nutrition/Hydration-ADULT  Goal: Nutrient/Hydration intake appropriate for improving, restoring or maintaining nutritional needs  Description: Monitor and assess patient's nutrition/hydration status for malnutrition  Collaborate with interdisciplinary team and initiate plan and interventions as ordered  Monitor patient's weight and dietary intake as ordered or per policy  Utilize nutrition screening tool and intervene as necessary  Determine patient's food preferences and provide high-protein, high-caloric foods as appropriate       INTERVENTIONS:  - Monitor oral intake, urinary output, labs, and treatment plans  - Assess nutrition and hydration status and recommend course of action  - Evaluate amount of meals eaten  - Assist patient with eating if necessary   - Allow adequate time for meals  - Recommend/ encourage appropriate diets, oral nutritional supplements, and vitamin/mineral supplements  - Order, calculate, and assess calorie counts as needed  - Assess need for intravenous fluids  - Provide specific nutrition/hydration education as appropriate  - Include patient/family/caregiver in decisions related to nutrition  Outcome: Progressing     Problem: PAIN - ADULT  Goal: Verbalizes/displays adequate comfort level or baseline comfort level  Description: Interventions:  - Encourage patient to monitor pain and request assistance  - Assess pain using appropriate pain scale  - Administer analgesics based on type and severity of pain and evaluate response  - Implement non-pharmacological measures as appropriate and evaluate response  - Consider cultural and social influences on pain and pain management  - Notify physician/advanced practitioner if interventions unsuccessful or patient reports new pain  Outcome: Progressing     Problem: INFECTION - ADULT  Goal: Absence or prevention of progression during hospitalization  Description: INTERVENTIONS:  - Assess and monitor for signs and symptoms of infection  - Monitor lab/diagnostic results  - Monitor all insertion sites, i e  indwelling lines, tubes, and drains  - [place] appropriate cooling/warming therapies per order  - Administer medications as ordered  - Instruct and encourage patient and family to use good hand hygiene technique  - Identify and instruct in appropriate isolation precautions for identified infection/condition  Outcome: Progressing     Problem: SAFETY ADULT  Goal: Patient will remain free of falls  Description: INTERVENTIONS:  - Assess patient frequently for physical needs  -  Identify cognitive and physical deficits and behaviors that affect risk of falls    - [place] fall precautions as indicated by assessment   - Educate patient/family on patient safety including physical limitations  - Instruct patient to call for assistance with activity based on assessment  - Modify environment to reduce risk of injury  - Consider OT/PT consult to assist with strengthening/mobility  Outcome: Progressing  Goal: Maintain or return to baseline ADL function  Description: INTERVENTIONS:  -  Assess patient's ability to carry out ADLs; assess patient's baseline for ADL function and identify physical deficits which impact ability to perform ADLs (bathing, care of mouth/teeth, toileting, grooming, dressing, etc )  - Assess/evaluate cause of self-care deficits   - Assess range of motion  - Assess patient's mobility; develop plan if impaired  - Assess patient's need for assistive devices and provide as appropriate  - Encourage maximum independence but intervene and supervise when necessary  - Involve family in performance of ADLs  - Assess for home care needs following discharge   - Consider OT consult to assist with ADL evaluation and planning for discharge  - Provide patient education as appropriate  Outcome: Progressing  Goal: Maintain or return mobility status to optimal level  Description: INTERVENTIONS:  - Assess patient's baseline mobility status (ambulation, transfers, stairs, etc )    - Identify cognitive and physical deficits and behaviors that affect mobility  - Identify mobility aids required to assist with transfers and/or ambulation (gait belt, sit-to-stand, lift, walker, cane, etc )  - [place] fall precautions as indicated by assessment  - Record patient progress and toleration of activity level on Mobility SBAR; progress patient to next Phase/Stage  - Instruct patient to call for assistance with activity based on assessment  - Consider rehabilitation consult to assist with strengthening/weightbearing, etc   Outcome: Progressing     Problem: DISCHARGE PLANNING  Goal: Discharge to home or other facility with appropriate resources  Description: INTERVENTIONS:  - Identify barriers to discharge w/patient and caregiver  - Arrange for needed discharge resources and transportation as appropriate  - Identify discharge learning needs (meds, wound care, etc )  - Arrange for interpretive services to assist at discharge as needed  - Refer to Case Management Department for coordinating discharge planning if the patient needs post-hospital services based on physician/advanced practitioner order or complex needs related to functional status, cognitive ability, or social support system  Outcome: Progressing     Problem: Knowledge Deficit  Goal: Patient/family/caregiver demonstrates understanding of disease process, treatment plan, medications, and discharge instructions  Description: Complete learning assessment and assess knowledge base    Interventions:  - Provide teaching at level of understanding  - Provide teaching via preferred learning methods  Outcome: Progressing

## 2021-02-24 NOTE — CASE MANAGEMENT
CM received a call from Jonn Reno  Admission with [facility] informing CM that they can accept pt and requested updated clinicals- faxed [phone]  Jonn Reno reported that she will submit auth  Jonn Reno also reported that a physician to physician report is needed once Dillon Rendono is obtained  CM will follow

## 2021-02-24 NOTE — CASE MANAGEMENT
CM was informed by Castillo Fatima with [facility] that they cannot accept pt  CM also noted in [address] that pt was denied by [facility]  CM informed pt that [facility] is following and can possible accept him  Pt reported that he wants to accept bed offer if [facility] can still accept him  CM called and spoke with Bethel Renteria, admission with [facility] and informed her of the above  Bethel Renteria reported that she will review pt with her medical director today and will call CM back

## 2021-02-24 NOTE — PROGRESS NOTES
Progress Note Rivka Mendoza 1960, 56 y o  male MRN: [id number]    Unit/Bed#: [facility] 726-01 Encounter: [id number]    Primary Care Provider: VERONICA Gutierrez   Date and time admitted to hospital: 1/13/2021  2:28 PM        Prolonged Q-T interval on ECG  Assessment & Plan  -the patient has prolonged QTC of 487, patient is on methadone and fluconazole  -Patient is on methadone for years  -Patient fluconazole was switched to micafungin per y ID    Right femoral vein DVT (HCC)  Assessment & Plan  Continue therapeutic Lovenox  Eliquis script sent to patient's primary pharmacy for price check      following  If Eliquis in affordable, can switch from subcu Lovenox therapeutic dose to Eliquis while in hospital     Ischemia of right lower extremity with suspected rhabdomyolysis  Assessment & Plan  · Suspected embolic from aortic thrombus  · 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  · Continue therapeutic Lovenox  ; Eliquis scripts sent to patient's pharmacy for price check  · Currently stable from a vascular surgery standpoint    Edema of right upper extremity  Assessment & Plan  · +2 pitting edema also with significant weakness/paresis   · S/p Venous duplex on 01/26 negative for acute or chronic DVT   · Resumed back on lasix with some improvement  · X-ray right humerus negative  Oropharyngeal dysphagia  Assessment & Plan  · Extubated 1/25 and required NGT for nutrition   · Speech therapy following   · S/p barium swallow - no evidence of leakage  · Continue modified diet  · Continue robinul  · S/p ENT consult, no vocal cord motion impairment  · Status post video barium swallow on 02/16  Muscle weakness of right upper extremity  Assessment & Plan  · Profound weakness, predominantly in right upper extremity, especially due to critical illness and steroid use  · During hospitalization, stroke alert was called because of concern for worsening right upper extremity weakness    CT head showed questionable hypodensity but no evidence of acute stroke or bleeding  · Patient currently on therapeutic Lovenox  · Strength currently 2 to 3/5 at baseline  · Evaluated by Neurology  No further inpatient workup required  · Right upper extremity x-ray per family request showed no acute intraosseous abnormality  Anemia  Assessment & Plan  · Secondary to critical illness, no sign of acute blood loss  · Neg stool occult  · Hemoglobin stable around his baseline  · Continue to monitor and transfuse if < 7    Aortic thrombus (HCC)  Assessment & Plan  · With embolic event that compromised right femoral artery  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  S/p R AKA formalization 1/18   · Continue therapeutic lovenox  · F/u with vascular surgery as outpatient    Acute respiratory failure with hypoxia ([facility] [address] )  Assessment & Plan  · Secondary to COVID 19  · Extubated 1/25  · Now resolved and on room air  Type 2 diabetes mellitus, with long-term current use of insulin [facility])  Assessment & Plan  Lab Results   Component Value Date    HGBA1C 11 6 (H) 12/09/2020       Recent Labs     02/24/21  1604 02/24/21  2055 02/25/21  0735 02/25/21  1116   POCGLU 154* 154* 159* 220*       Blood Sugar Average: Last 72 hrs:  (P) 151 [id number]     · BBG on the lower side, patient does not eating much due to dysphagia  · Continue Lantus 10 units + SSI  · Discontinue 3 units Humalog t i d  With meals  BMI 28 0-28 9,adult  Assessment & Plan        Methadone maintenance therapy patient [facility])  Assessment & Plan  Chronic methadone use  Methadone was held yesterday due to lethargy yesterday  Restarted methadone today as patient requested due to significant limb pain  And he is on chronic methadone therapy  Will continue to hold gabapentin for now to avoid over sedation and lethargy  Fluconazole discontinued due to prolonged QTC with concomitant methadone fluconazole use      HTN (hypertension)  Assessment & Plan  · Stable  · Continue lisinopril, lasix    * Left Hydropneumothorax  Assessment & Plan  Candida empyema  Status post pigtail  On 21  Now on room air  Pulmonology and thoracic surgery following  ID on board - fluconazole switched to micafungin due to prolonged QTC along with methadone use  PICC line consent obtained and will need PICC line for IV micafungin until 2021  Follow-up in the thoracic office 1 month after the discharge from the hospital with chest x-ray PA and lateral results    Acute encephalopathy-resolved as of 2021  Assessment & Plan  Now resolved  Most likely toxic metabolic due to methadone and gabapentin  Continue to hold gabapentin  Methadone restarted at home dose as patient is on chronic methadone therapy and is requesting for his pain  Avoid any further sedating medications  Urinary retention-resolved as of 2021  Assessment & Plan  Resolved         VTE Pharmacologic Prophylaxis:   Pharmacologic: Apixaban (Eliquis)  Mechanical VTE Prophylaxis in Place: Yes    Patient Centered Rounds: I have performed bedside rounds with nursing staff today  Discussions with Specialists or Other Care Team Provider:      Education and Discussions with Family / Patient: patient     Time Spent for Care: 45 minutes  More than 50% of total time spent on counseling and coordination of care as described above  Current Length of Stay: 37 day(s)    Current Patient Status: Inpatient   Certification Statement: The patient will continue to require additional inpatient hospital stay due to placement    Discharge Plan: pending placement    Code Status: Level 1 - Full Code      Subjective:   Patient needs a placement    Objective:     Vitals:   Temp (24hrs), Av 2 °F (36 8 °C), Min:97 6 °F (36 4 °C), Max:98 5 °F (36 9 °C)    Temp:  [97 6 °F (36 4 °C)-98 5 °F (36 9 °C)] 97 6 °F (36 4 °C)  HR:  [80-82] 82  Resp:  [17-18] 17  BP: (122-135)/(77-80) 128/80  SpO2:  [97 %-99 %] 99 %  Body mass index is 23 48 kg/m²       Input and Output Summary (last 24 hours): Intake/Output Summary (Last 24 hours) at 2/25/2021 1422  Last data filed at 2/25/2021 0555  Gross per 24 hour   Intake 922 ml   Output 550 ml   Net 372 ml       Physical Exam:     Physical Exam  Vitals signs and nursing note reviewed  Neck:      Musculoskeletal: Normal range of motion and neck supple  Cardiovascular:      Rate and Rhythm: Normal rate  Abdominal:      General: Abdomen is flat  Bowel sounds are normal  There is no distension  Palpations: There is no mass  Tenderness: There is no abdominal tenderness  Hernia: No hernia is present  Musculoskeletal: Normal range of motion  General: No swelling, tenderness, deformity or signs of injury  Right lower leg: No edema  Skin:     General: Skin is warm and dry  Coloration: Skin is not jaundiced or pale  Findings: No bruising, erythema or lesion  Neurological:      General: No focal deficit present  Mental Status: He is alert and oriented to person, place, and time  Additional Data:     Labs:    Results from last 7 days   Lab Units 02/25/21  0600   WBC Thousand/uL 11 03*   HEMOGLOBIN g/dL 9 3*   HEMATOCRIT % 31 4*   PLATELETS Thousands/uL 388   NEUTROS PCT % 82*   LYMPHS PCT % 10*   MONOS PCT % 5   EOS PCT % 1     Results from last 7 days   Lab Units 02/25/21  0600   POTASSIUM mmol/L 3 0*   CHLORIDE mmol/L 112*   CO2 mmol/L 26   BUN mg/dL 13   CREATININE mg/dL 0 33*   CALCIUM mg/dL 7 4*   ALK PHOS U/L 96   ALT U/L 8*   AST U/L 12           * I Have Reviewed All Lab Data Listed Above  * Additional Pertinent Lab Tests Reviewed:  [address] Admission Reviewed    Imaging:    Imaging Reports Reviewed Today Include:    Imaging Personally Reviewed by Myself Includes:      Recent Cultures (last 7 days):           Last 24 Hours Medication List:   Current Facility-Administered Medications   Medication Dose Route Frequency Provider Last Rate    acetaminophen  975 mg Oral [facility] Manisha Bush MD      albumin human  25 g Intravenous Once Vicente Kelly MD Stopped (02/15/21 1715)    albuterol  2 puff Inhalation Q4H PRN Miley Castillo DO      apixaban  10 mg Oral BID MD Vishal Rdz [START ON 3/1/2021] apixaban  5 mg Oral BID Manisha Bush MD      bisacodyl  10 mg Rectal Daily PRN VERONICA Whitman      cholecalciferol  2,000 Units Oral Daily VERONICA Kennedy      Diclofenac Sodium  4 g Topical 4x Daily VERONICA Kennedy      furosemide  40 mg Oral Daily Miley Castillo DO      glycopyrrolate  1 mg Oral TID VERONICA Kennedy      insulin glargine  4 Units Subcutaneous HS Jenifer Lindquist MD      insulin lispro  1-5 Units Subcutaneous TID AC Madai Weller MD      Lidocaine Viscous HCl  15 mL Swish & Spit 4x Daily PRN VERONICA Kennedy      lisinopril  10 mg Oral Daily Shannan Hopkins, VERONICA      menthol-methyl salicylate   Apply externally 4x Daily PRN VERONICA Whitman      methadone  70 mg Oral Daily Manisha Bush MD      micafungin  100 mg Intravenous Q24H Manisha Bush  mg (02/24/21 1745)   Vishal Downs SAMREEN ANTIFUNGAL  1 application Topical TID Hetul Mccall, DO      multivitamin-minerals  1 tablet Oral Daily VERONICA Kennedy      omeprazole (PRILOSEC) suspension 2 mg/mL  20 mg Oral Early Morning Shannan A Sandeep, VERONICA      ondansetron  4 mg Intravenous Q6H PRN Toby Beyer PA-C      phenol  1 spray Mouth/Throat Q2H PRN VERONICA Kennedy      polyethylene glycol  17 g Oral Daily Shannan A Sandeep, VERONICA      senna  1 tablet Oral BID Shannan A Sandeep, VERONICA      sodium chloride  1 spray Each Nare Q1H PRN Evon Kehr, DO      sodium chloride  1,000 mL Intravenous Once Vicente Kelly MD Stopped (02/15/21 1715)        Today, Patient Was Seen By: Jose Islas MD    ** Please Note: Dictation voice to text software may have been used in the creation of this document   **

## 2021-02-24 NOTE — WOUND OSTOMY CARE
Progress Note - Wound   Codi Lockwood 56 y o  male MRN: [id number]  Unit/Bed#: [facility] 726-01 Encounter: [id number]        Assessment:   Patient see for wound care follow up, he is awake, alert complaining about being on alternating air mattress, he states " this is mattress is an pain, it keeps moving" Patient was educated on purpose of alternating pressure to aid in healing wound  Patient seems overall unhappy about being here and his medical condition  Patient agreeable for wound care nurse to assess skin and provide treatment  Findings  1-Evolved sacral DTI now has two residual wounds  Mid sacral wound with yellow slough, R sacro-buttock wounds has pink granular wound bed  Palpable induration present between the wounds  Skin/Wound care orders:   1-Cleanse sacro-buttock wound with soap and water, dry thoroughly  Apply skin prep to periwound, apply triad paste to wound bed, cover treated area with Hydrocolloid, change every three days and as needed  2-Elevate heel to offload pressure  3-Ehob cushion in chair when out of bed  4-Moisturize skin daily with skin nourishing cream    5-Turn/reposition q2h or when medically stable for pressure re-distribution on skin  6-Allevyn foam to heel, juan w/P, peel foam check skin integrity q-shift  Change q3d    7-Cleanse base of R 5th finger healing DTI with NSS dermagran, band-aid  Change every three days     8-Place patient on P500 alternating air mattress  Vitals: Blood pressure 144/80, pulse 80, temperature 98 2 °F (36 8 °C), resp  rate 16, height 5' 6" (1 676 m), weight 66 kg (145 lb 8 1 oz), SpO2 96 %  ,Body mass index is 23 48 kg/m²  Wound 01/12/21 Pressure Injury Sacrum Mid (Active)   Wound Image     02/24/21 1213   Wound Description Slough; Yellow 02/24/21 1213   Pressure Injury Stage DTPI 02/24/21 1213   Renuka-wound Assessment Intact 02/24/21 1213   Wound Length (cm) 6 cm 02/24/21 1213   Wound Width (cm) 5 5 cm 02/24/21 1213   Wound Surface Area (cm^2) 33 cm^2 02/24/21 1213   Wound Site Closure Unapproximated 02/23/21 2344   Drainage Amount Small 02/24/21 1213   Drainage Description Purulent 02/24/21 1213   Treatments Cleansed;Site care 02/24/21 1213   Dressing Other (Comment) 02/24/21 1213   Dressing Changed Changed 02/22/21 0900   Patient Tolerance Tolerated well 02/24/21 1213   Dressing Status Clean;Dry; Intact 02/23/21 0934   Wound 01/19/21 Pressure Injury Finger (Comment which one) Right;Posterior (Active)   Wound Description Dry;Clean;Other (Comment) 02/23/21 2344   Pressure Injury Stage 1 02/18/21 2000   Renuka-wound Assessment Clean;Dry; Intact 02/23/21 2344   Wound Length (cm) 1 cm 02/17/21 1154   Wound Width (cm) 1 5 cm 02/17/21 1154   Wound Surface Area (cm^2) 1 5 cm^2 02/17/21 1154   Wound Site Closure None 02/23/21 2344   Drainage Amount None 02/23/21 2344   Treatments Cleansed;Site care 02/16/21 2000   Dressing Open to air 02/23/21 2344   Patient Tolerance Tolerated well 02/17/21 1154   Dressing Status Clean;Dry; Intact 02/15/21 2000       Wound 02/03/21 Pressure Injury Finger (Comment which one) Posterior;Right (Active)   Wound Description Clean;Dry 02/23/21 2344   Pressure Injury Stage DTPI 02/22/21 2220   Renuka-wound Assessment Clean;Dry; Intact 02/23/21 2344   Wound Length (cm) 0 6 cm 02/17/21 1154   Wound Width (cm) 1 cm 02/17/21 1154   Wound Depth (cm) 0 1 cm 02/17/21 1154   Wound Surface Area (cm^2) 0 6 cm^2 02/17/21 1154   Wound Volume (cm^3) 0 06 cm^3 02/17/21 1154   Calculated Wound Volume (cm^3) 0 06 cm^3 02/17/21 1154   Drainage Amount None 02/23/21 2344   Treatments Site care 02/17/21 1154   Dressing Open to air 02/23/21 2344   Patient Tolerance Tolerated well 02/17/21 1154         Our skin care recommendations remains as nursing orders, wound care to continue following        Merle Rao, RN, BSN, [facility]

## 2021-02-24 NOTE — PLAN OF CARE
Problem: Prexisting or High Potential for Compromised Skin Integrity  Goal: Skin integrity is maintained or improved  Description: INTERVENTIONS:  - Identify patients at risk for skin breakdown  - Assess and monitor skin integrity  - Assess and monitor nutrition and hydration status  - Monitor labs   - Assess for incontinence   - Turn and reposition patient  - Assist with mobility/ambulation  - Relieve pressure over bony prominences  - Avoid friction and shearing  - Provide appropriate hygiene as needed including keeping skin clean and dry  - Evaluate need for skin moisturizer/barrier cream  - Collaborate with interdisciplinary team   - Patient/family teaching  - Consider wound care consult   Outcome: Progressing     Problem: Potential for Falls  Goal: Patient will remain free of falls  Description: INTERVENTIONS:  - Assess patient frequently for physical needs  -  Identify cognitive and physical deficits and behaviors that affect risk of falls  -  [place] fall precautions as indicated by assessment   - Educate patient/family on patient safety including physical limitations  - Instruct patient to call for assistance with activity based on assessment  - Modify environment to reduce risk of injury  - Consider OT/PT consult to assist with strengthening/mobility  Outcome: Progressing     Problem: Nutrition/Hydration-ADULT  Goal: Nutrient/Hydration intake appropriate for improving, restoring or maintaining nutritional needs  Description: Monitor and assess patient's nutrition/hydration status for malnutrition  Collaborate with interdisciplinary team and initiate plan and interventions as ordered  Monitor patient's weight and dietary intake as ordered or per policy  Utilize nutrition screening tool and intervene as necessary  Determine patient's food preferences and provide high-protein, high-caloric foods as appropriate       INTERVENTIONS:  - Monitor oral intake, urinary output, labs, and treatment plans  - Assess nutrition and hydration status and recommend course of action  - Evaluate amount of meals eaten  - Assist patient with eating if necessary   - Allow adequate time for meals  - Recommend/ encourage appropriate diets, oral nutritional supplements, and vitamin/mineral supplements  - Order, calculate, and assess calorie counts as needed  - Assess need for intravenous fluids  - Provide specific nutrition/hydration education as appropriate  - Include patient/family/caregiver in decisions related to nutrition  Outcome: Progressing     Problem: PAIN - ADULT  Goal: Verbalizes/displays adequate comfort level or baseline comfort level  Description: Interventions:  - Encourage patient to monitor pain and request assistance  - Assess pain using appropriate pain scale  - Administer analgesics based on type and severity of pain and evaluate response  - Implement non-pharmacological measures as appropriate and evaluate response  - Consider cultural and social influences on pain and pain management  - Notify physician/advanced practitioner if interventions unsuccessful or patient reports new pain  Outcome: Progressing     Problem: INFECTION - ADULT  Goal: Absence or prevention of progression during hospitalization  Description: INTERVENTIONS:  - Assess and monitor for signs and symptoms of infection  - Monitor lab/diagnostic results  - Monitor all insertion sites, i e  indwelling lines, tubes, and drains  - [place] appropriate cooling/warming therapies per order  - Administer medications as ordered  - Instruct and encourage patient and family to use good hand hygiene technique  - Identify and instruct in appropriate isolation precautions for identified infection/condition  Outcome: Progressing     Problem: SAFETY ADULT  Goal: Patient will remain free of falls  Description: INTERVENTIONS:  - Assess patient frequently for physical needs  -  Identify cognitive and physical deficits and behaviors that affect risk of falls    - [place] fall precautions as indicated by assessment   - Educate patient/family on patient safety including physical limitations  - Instruct patient to call for assistance with activity based on assessment  - Modify environment to reduce risk of injury  - Consider OT/PT consult to assist with strengthening/mobility  Outcome: Progressing  Goal: Maintain or return to baseline ADL function  Description: INTERVENTIONS:  -  Assess patient's ability to carry out ADLs; assess patient's baseline for ADL function and identify physical deficits which impact ability to perform ADLs (bathing, care of mouth/teeth, toileting, grooming, dressing, etc )  - Assess/evaluate cause of self-care deficits   - Assess range of motion  - Assess patient's mobility; develop plan if impaired  - Assess patient's need for assistive devices and provide as appropriate  - Encourage maximum independence but intervene and supervise when necessary  - Involve family in performance of ADLs  - Assess for home care needs following discharge   - Consider OT consult to assist with ADL evaluation and planning for discharge  - Provide patient education as appropriate  Outcome: Progressing  Goal: Maintain or return mobility status to optimal level  Description: INTERVENTIONS:  - Assess patient's baseline mobility status (ambulation, transfers, stairs, etc )    - Identify cognitive and physical deficits and behaviors that affect mobility  - Identify mobility aids required to assist with transfers and/or ambulation (gait belt, sit-to-stand, lift, walker, cane, etc )  - [place] fall precautions as indicated by assessment  - Record patient progress and toleration of activity level on Mobility SBAR; progress patient to next Phase/Stage  - Instruct patient to call for assistance with activity based on assessment  - Consider rehabilitation consult to assist with strengthening/weightbearing, etc   Outcome: Progressing     Problem: DISCHARGE PLANNING  Goal: Discharge to home or other facility with appropriate resources  Description: INTERVENTIONS:  - Identify barriers to discharge w/patient and caregiver  - Arrange for needed discharge resources and transportation as appropriate  - Identify discharge learning needs (meds, wound care, etc )  - Arrange for interpretive services to assist at discharge as needed  - Refer to Case Management Department for coordinating discharge planning if the patient needs post-hospital services based on physician/advanced practitioner order or complex needs related to functional status, cognitive ability, or social support system  Outcome: Progressing     Problem: Knowledge Deficit  Goal: Patient/family/caregiver demonstrates understanding of disease process, treatment plan, medications, and discharge instructions  Description: Complete learning assessment and assess knowledge base    Interventions:  - Provide teaching at level of understanding  - Provide teaching via preferred learning methods  Outcome: Progressing

## 2021-02-24 NOTE — DISCHARGE INSTRUCTIONS
DISCHARGE I        Chest Tubes   WHAT YOU NEED TO KNOW:   What do I need to know about a chest tube? A chest tube is also known as chest drain or chest drainage tube  It is a plastic tube that is put through the side of your chest  It uses a suction device to remove air, blood, or fluid from around your heart or lung  A chest tube will help you breathe more easily  How do I prepare for a chest tube to be inserted? Your healthcare provider will tell you how to prepare  You may be given general anesthesia to keep you asleep and free from pain during the procedure  You may instead be given local anesthesia or a nerve block  You will be able to feel some pressure during the procedure, but you should not feel any pain  You may be given antibiotics to prevent a bacterial infection  Tell your healthcare provider if you have ever had an allergic reaction to anesthesia or an antibiotic  What will happen when a chest tube is inserted? Your healthcare provider will make a small incision in your chest  A tool is used to make an opening through the chest muscle  The chest tube is inserted slowly until it reaches the pleural space or chest cavity  Your healthcare provider may use an ultrasound to guide him or her  When the tube is in place, it will be connected to suction and a drainage system  Stitches may be sewn into your chest wall to hold the tube in place  Tape may also be used to secure the tube before it is covered with a bandage  What happens after a chest tube has been inserted? · Medicines  may be given to relieve pain or prevent a bacterial infection  · An x-ray or a CT scan  may be used to make sure the tube is in the right place  You may also need an x-ray after your chest tube is removed  What do I need to know about chest tube removal?   · Your healthcare provider will tell you when the chest tube can be removed  After heart surgery, the chest may be removed within 72 hours   For lungs, the chest tube can be taken out when your lung is working normally again  One sign of this is little or no fluid draining into the chest tube  Another sign is no air leaking for 1 to 2 days  You may need a chest x-ray to make sure your lung is working as it should  · You may be given medicine to treat pain before the tube is removed  The tape will be removed  The stitches holding the tube in place will be loosened  You may need to breathe a certain way as the tube is taken out  Your healthcare provider will remove the tube  He or she may tighten the stitches to close the opening  He or she will cover the area with a bandage that will stop air from getting into your chest     What are the risks of a chest tube? · You may get an infection in the area where the tube was inserted  The tube may damage organs that are close to your lungs  Your chest tube may move out of place when you move or turn  If this happens, you may need to have another chest tube put in     · You may get a blood clot in your leg or arm  This can cause pain and swelling, and it can stop blood from flowing where it needs to go in your body  The blood clot can break loose and travel to your lungs  A blood clot in your lungs can cause chest pain and trouble breathing  This can be life-threatening  CARE AGREEMENT:   You have the right to help plan your care  Learn about your health condition and how it may be treated  Discuss treatment options with your healthcare providers to decide what care you want to receive  You always have the right to refuse treatment  The above information is an  only  It is not intended as medical advice for individual conditions or treatments  Talk to your doctor, nurse or pharmacist before following any medical regimen to see if it is safe and effective for you  © Copyright [address] Information is for End User's use only and may not be sold, redistributed or otherwise used for commercial purposes   All illustrations and images included in CareNotes® are the copyrighted property of [employer] D A M [employer]  or Carol Chu    CBC with diff and CMP weekly while on micafungin    Remove PICC line after last dose of micafungin 3/12/2021

## 2021-02-24 NOTE — PHYSICAL THERAPY NOTE
Physical Therapy Treatment Note     Patient Name: Rosa COLÓNZKS'P Date: 2/24/2021     Problem List  Principal Problem:    Left Hydropneumothorax  Active Problems:    HTN (hypertension)    Methadone maintenance therapy patient ([facility] )    BMI 28 0-28 9,adult    Type 2 diabetes mellitus, with long-term current use of insulin (HCC)    Acute respiratory failure with hypoxia (HCC)    Aortic thrombus (HCC)    Anemia    Muscle weakness of right upper extremity    Oropharyngeal dysphagia    Edema of right upper extremity    Ischemia of right lower extremity with suspected rhabdomyolysis    Right femoral vein DVT ([facility])    RUE weakness    Prolonged Q-T interval on ECG       Past Medical History  Past Medical History:   Diagnosis Date    Diabetes mellitus ([facility] )     GERD (gastroesophageal reflux disease)     Hypercholesteremia     Hypertension     Methadone use ([place] [address] )         Past Surgical History  Past Surgical History:   Procedure Laterality Date    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    Procedure: AMPUTATION ABOVE KNEE (AKA);   Surgeon: Laxmi Gardner MD;  [facility];  Service: Vascular    AMPUTATION ABOVE KNEE (AKA) Right 1/18/2021    Procedure: AMPUTATION ABOVE KNEE (AKA) FORMALIZATION,  R AKA wound washout, wound closure;  Surgeon: Laxmi Gardner MD;  [facility];  Service: Vascular    ARTERIOGRAM Right 1/13/2021    Procedure: ARTERIOGRAM;  Surgeon: Molly Barnett DO;  [facility];  Service: Vascular    IR CHEST TUBE PLACEMENT  2/10/2021    IR LOWER EXTREMITY ANGIOGRAM  1/14/2021    THROMBECTOMY W/ EMBOLECTOMY Right 1/13/2021    Procedure: EMBOLECTOMY/THROMBECTOMY LOWER EXTREMITY;  Surgeon: Molly Barnett DO;  [facility];  Service: Vascular      02/24/21 1042   PT Last Visit   PT Visit Date 02/24/21   Note Type   Note Type Treatment   Pain Assessment   Pain Assessment Tool 0-10   Pain Score 8   Pain Location/Orientation Orientation: Right  (R limb)   Restrictions/Precautions   Weight Bearing Precautions Per Order Yes   RLE Weight Bearing Per Order NWB  (s/p AKA)   Other Precautions Chair Alarm; Bed Alarm; Fall Risk;Pain   General   Chart Reviewed Yes   Cognition   Overall Cognitive Status Impaired   Arousal/Participation Alert; Responsive; Cooperative   Attention Attends with cues to redirect   Orientation Level Oriented X4   Memory Decreased recall of precautions;Decreased recall of recent events   Following Commands Follows one step commands inconsistently   Comments Patient is very pleasant and motiviated to participate in PT session  Does require encourgement at times as pt is fraustrated about limitations    Subjective   Subjective "I feel good today"   Bed Mobility   Rolling L 4  Minimal assistance   Additional items Assist x 1; Increased time required;Verbal cues   Supine to Sit 3  Moderate assistance   Additional items Assist x 1; Increased time required;Verbal cues   Sit to Supine 5  Supervision   Additional items Assist x 1; Increased time required;Verbal cues; Other   Additional Comments Able to perform supine to sit with mod A x 1 for UB trunk; cues to roll over and bring BLE off the EOB  Heavily relies on bed rail and HOB elevated  Transfers   Sit to Stand Unable to assess   Additional Comments Not tested today   Balance   Static Sitting Fair -   Dynamic Sitting Poor +   Endurance Deficit   Endurance Deficit Yes   Endurance Deficit Description fatigue   Activity Tolerance   Activity Tolerance Patient limited by fatigue   Exercises   Hip Flexion Supine;Left;AROM; Right  (12 reps, 17 reps )   Hip Abduction Right;AAROM  (side-lying, 20 reps , 22 reps)   Hip Extension AROM; Right  (side-lying, 15 reps x 18 reps)   Knee AROM Long Arc Quad Sitting;Left;20 reps;AROM; Right  (x2 sets )   Heel Cord Stretch Sitting;PROM; Left  (30 second holds, x 3 with knee extension)   Balance training  Unsuported sititng balance at supervision level for unsupported static sitting >15 minutes  Improvement noted in head control but continues to require cues   Assessment   Prognosis Good   Problem List Decreased strength;Decreased endurance; Impaired balance;Decreased mobility; Decreased skin integrity   Assessment Pt continues to make improvement in sitting tolerance/activity tolerance  Able to complete increase # reps and sets of all there-ex today  Additionally, pt tolerated sitting EOB > 15 minutes day prior requiring a rest break  Improvement noted in trunk control as pt did not require any physical assistance to maintain unsupported sitting tolerance today  Plan for next session is to transfer patient to [facility] and initiate WC mobility  Pt is motivated to participate in PT session and is always willing to try any activities  Pt to benefit from continued inpatient PT services  Recommend STR upon discharge once medically stable  Barriers to Discharge Decreased caregiver support; Inaccessible home environment   Goals   Patient Goals To go to rehab   STG Expiration Date 02/26/21   PT Treatment Day 9   Plan   Treatment/Interventions LE strengthening/ROM; Functional transfer training;ADL retraining; Therapeutic exercise; Endurance training;Cognitive reorientation; Bed mobility; Patient/family training   Progress Progressing toward goals   PT Frequency   (3-5x/wk)   Recommendation   PT Discharge Recommendation Post-Acute Rehabilitation Services   Equipment Recommended Wheelchair   PT - OK to Discharge Yes   Additional Comments to STR once medically    AM-PAC Basic Mobility Inpatient   Turning in Bed Without Bedrails 4   Lying on Back to Sitting on Edge of Flat Bed 2   Moving Bed to Chair 1   Standing Up From Chair 1   Walk in Room 1   Climb 3-5 Stairs 1   Basic Mobility Inpatient Raw Score 10   Turning Head Towards Sound 4   Follow Simple Instructions 4   Low Function Basic Mobility Raw Score 18   Low Function Basic Mobility Standardized Score 29 25 Semaj Espinoza PT, DPT

## 2021-02-24 NOTE — PLAN OF CARE
Problem: PHYSICAL THERAPY ADULT  Goal: Performs mobility at highest level of function for planned discharge setting  See evaluation for individualized goals  Description: Treatment/Interventions: Functional transfer training, LE strengthening/ROM, Therapeutic exercise, Endurance training, Bed mobility          See flowsheet documentation for full assessment, interventions and recommendations  Outcome: Progressing  Note: Prognosis: Good  Problem List: Decreased strength, Decreased endurance, Impaired balance, Decreased mobility, Decreased skin integrity  Assessment: Pt continues to make improvement in sitting tolerance/activity tolerance  Able to complete increase # reps and sets of all there-ex today  Additionally, pt tolerated sitting EOB > 15 minutes day prior requiring a rest break  Improvement noted in trunk control as pt did not require any physical assistance to maintain unsupported sitting tolerance today  Plan for next session is to transfer patient to [facility] and initiate WC mobility  Pt is motivated to participate in PT session and is always willing to try any activities  Pt to benefit from continued inpatient PT services  Recommend STR upon discharge once medically stable  Barriers to Discharge: Decreased caregiver support, Inaccessible home environment     PT Discharge Recommendation: [id number]     PT - OK to Discharge: Yes    See flowsheet documentation for full assessment

## 2021-02-25 VITALS
HEIGHT: 66 IN | RESPIRATION RATE: 16 BRPM | WEIGHT: 145.5 LBS | HEART RATE: 94 BPM | DIASTOLIC BLOOD PRESSURE: 84 MMHG | TEMPERATURE: 97.9 F | BODY MASS INDEX: 23.38 KG/M2 | SYSTOLIC BLOOD PRESSURE: 128 MMHG | OXYGEN SATURATION: 94 %

## 2021-02-25 LAB
ALBUMIN SERPL BCP-MCNC: 1.3 G/DL (ref 3.5–5)
ALP SERPL-CCNC: 96 U/L (ref 46–116)
ALT SERPL W P-5'-P-CCNC: 8 U/L (ref 12–78)
ANION GAP SERPL CALCULATED.3IONS-SCNC: 6 MMOL/L (ref 4–13)
AST SERPL W P-5'-P-CCNC: 12 U/L (ref 5–45)
BASOPHILS # BLD AUTO: 0.06 THOUSANDS/ΜL (ref 0–0.1)
BASOPHILS NFR BLD AUTO: 1 % (ref 0–1)
BILIRUB SERPL-MCNC: 0.29 MG/DL (ref 0.2–1)
BUN SERPL-MCNC: 13 MG/DL (ref 5–25)
CALCIUM ALBUM COR SERPL-MCNC: 9.6 MG/DL (ref 8.3–10.1)
CALCIUM SERPL-MCNC: 7.4 MG/DL (ref 8.3–10.1)
CHLORIDE SERPL-SCNC: 112 MMOL/L (ref 100–108)
CO2 SERPL-SCNC: 26 MMOL/L (ref 21–32)
CREAT SERPL-MCNC: 0.33 MG/DL (ref 0.6–1.3)
EOSINOPHIL # BLD AUTO: 0.06 THOUSAND/ΜL (ref 0–0.61)
EOSINOPHIL NFR BLD AUTO: 1 % (ref 0–6)
ERYTHROCYTE [DISTWIDTH] IN BLOOD BY AUTOMATED COUNT: 16.7 % (ref 11.6–15.1)
FLUAV RNA RESP QL NAA+PROBE: NEGATIVE
FLUBV RNA RESP QL NAA+PROBE: NEGATIVE
GFR SERPL CREATININE-BSD FRML MDRD: 138 ML/MIN/1.73SQ M
GLUCOSE SERPL-MCNC: 121 MG/DL (ref 65–140)
GLUCOSE SERPL-MCNC: 135 MG/DL (ref 65–140)
GLUCOSE SERPL-MCNC: 159 MG/DL (ref 65–140)
GLUCOSE SERPL-MCNC: 220 MG/DL (ref 65–140)
HCT VFR BLD AUTO: 31.4 % (ref 36.5–49.3)
HGB BLD-MCNC: 9.3 G/DL (ref 12–17)
IMM GRANULOCYTES # BLD AUTO: 0.06 THOUSAND/UL (ref 0–0.2)
IMM GRANULOCYTES NFR BLD AUTO: 1 % (ref 0–2)
LYMPHOCYTES # BLD AUTO: 1.12 THOUSANDS/ΜL (ref 0.6–4.47)
LYMPHOCYTES NFR BLD AUTO: 10 % (ref 14–44)
MCH RBC QN AUTO: 27 PG (ref 26.8–34.3)
MCHC RBC AUTO-ENTMCNC: 29.6 G/DL (ref 31.4–37.4)
MCV RBC AUTO: 91 FL (ref 82–98)
MONOCYTES # BLD AUTO: 0.59 THOUSAND/ΜL (ref 0.17–1.22)
MONOCYTES NFR BLD AUTO: 5 % (ref 4–12)
NEUTROPHILS # BLD AUTO: 9.14 THOUSANDS/ΜL (ref 1.85–7.62)
NEUTS SEG NFR BLD AUTO: 82 % (ref 43–75)
NRBC BLD AUTO-RTO: 0 /100 WBCS
PLATELET # BLD AUTO: 388 THOUSANDS/UL (ref 149–390)
PMV BLD AUTO: 10 FL (ref 8.9–12.7)
POTASSIUM SERPL-SCNC: 3 MMOL/L (ref 3.5–5.3)
PROT SERPL-MCNC: 6.8 G/DL (ref 6.4–8.2)
RBC # BLD AUTO: 3.45 MILLION/UL (ref 3.88–5.62)
RSV RNA RESP QL NAA+PROBE: NEGATIVE
SARS-COV-2 RNA RESP QL NAA+PROBE: NEGATIVE
SODIUM SERPL-SCNC: 144 MMOL/L (ref 136–145)
WBC # BLD AUTO: 11.03 THOUSAND/UL (ref 4.31–10.16)

## 2021-02-25 PROCEDURE — 99239 HOSP IP/OBS DSCHRG MGMT >30: CPT | Performed by: INTERNAL MEDICINE

## 2021-02-25 PROCEDURE — 85025 COMPLETE CBC W/AUTO DIFF WBC: CPT | Performed by: INTERNAL MEDICINE

## 2021-02-25 PROCEDURE — 99233 SBSQ HOSP IP/OBS HIGH 50: CPT | Performed by: INTERNAL MEDICINE

## 2021-02-25 PROCEDURE — 0241U HB NFCT DS VIR RESP RNA 4 TRGT: CPT | Performed by: INTERNAL MEDICINE

## 2021-02-25 PROCEDURE — 82948 REAGENT STRIP/BLOOD GLUCOSE: CPT

## 2021-02-25 PROCEDURE — 97530 THERAPEUTIC ACTIVITIES: CPT

## 2021-02-25 PROCEDURE — 97542 WHEELCHAIR MNGMENT TRAINING: CPT

## 2021-02-25 PROCEDURE — 80053 COMPREHEN METABOLIC PANEL: CPT | Performed by: INTERNAL MEDICINE

## 2021-02-25 RX ORDER — GLYCOPYRROLATE 1 MG/1
1 TABLET ORAL 3 TIMES DAILY
Refills: 0
Start: 2021-02-25 | End: 2021-06-01

## 2021-02-25 RX ORDER — LISINOPRIL 10 MG/1
10 TABLET ORAL DAILY
Refills: 0
Start: 2021-02-26 | End: 2021-04-26 | Stop reason: SDUPTHER

## 2021-02-25 RX ORDER — MELATONIN
2000 DAILY
Refills: 0
Start: 2021-02-26 | End: 2021-04-26 | Stop reason: SDUPTHER

## 2021-02-25 RX ORDER — ACETAMINOPHEN 325 MG/1
975 TABLET ORAL EVERY 8 HOURS SCHEDULED
Refills: 0
Start: 2021-02-25

## 2021-02-25 RX ORDER — MUSCLE RUB CREAM 100; 150 MG/G; MG/G
CREAM TOPICAL 4 TIMES DAILY PRN
Refills: 0
Start: 2021-02-25

## 2021-02-25 RX ORDER — BISACODYL 10 MG
10 SUPPOSITORY, RECTAL RECTAL DAILY PRN
Qty: 12 SUPPOSITORY | Refills: 0
Start: 2021-02-25 | End: 2021-06-25 | Stop reason: HOSPADM

## 2021-02-25 RX ORDER — POLYETHYLENE GLYCOL 3350 17 G/17G
17 POWDER, FOR SOLUTION ORAL DAILY
Refills: 0
Start: 2021-02-26 | End: 2021-06-01

## 2021-02-25 RX ORDER — METHADONE HYDROCHLORIDE 10 MG/1
70 TABLET ORAL DAILY
Refills: 0 | Status: ON HOLD
Start: 2021-02-26 | End: 2021-03-03 | Stop reason: SDUPTHER

## 2021-02-25 RX ORDER — INSULIN GLARGINE 100 [IU]/ML
4 INJECTION, SOLUTION SUBCUTANEOUS
Refills: 0
Start: 2021-02-25 | End: 2021-06-01

## 2021-02-25 RX ORDER — LIDOCAINE HYDROCHLORIDE 20 MG/ML
15 SOLUTION OROPHARYNGEAL 4 TIMES DAILY PRN
Refills: 0
Start: 2021-02-25 | End: 2021-06-01

## 2021-02-25 RX ORDER — POTASSIUM CHLORIDE 20 MEQ/1
40 TABLET, EXTENDED RELEASE ORAL DAILY
Refills: 0
Start: 2021-02-25 | End: 2021-03-04 | Stop reason: HOSPADM

## 2021-02-25 RX ORDER — POTASSIUM CHLORIDE 20 MEQ/1
40 TABLET, EXTENDED RELEASE ORAL DAILY
Status: DISCONTINUED | OUTPATIENT
Start: 2021-02-25 | End: 2021-02-25 | Stop reason: HOSPADM

## 2021-02-25 RX ORDER — ALBUTEROL SULFATE 90 UG/1
2 AEROSOL, METERED RESPIRATORY (INHALATION) EVERY 4 HOURS PRN
Refills: 0
Start: 2021-02-25 | End: 2021-03-29 | Stop reason: SDUPTHER

## 2021-02-25 RX ORDER — MULTIVITAMIN/IRON/FOLIC ACID 18MG-0.4MG
1 TABLET ORAL DAILY
Refills: 0
Start: 2021-02-26

## 2021-02-25 RX ADMIN — GLYCOPYRROLATE 1 MG: 1 TABLET ORAL at 09:22

## 2021-02-25 RX ADMIN — Medication 1 TABLET: at 09:09

## 2021-02-25 RX ADMIN — METHADONE HYDROCHLORIDE 70 MG: 10 TABLET ORAL at 09:26

## 2021-02-25 RX ADMIN — MICONAZOLE NITRATE 1 APPLICATION: 20 CREAM TOPICAL at 09:21

## 2021-02-25 RX ADMIN — MICONAZOLE NITRATE 1 APPLICATION: 20 CREAM TOPICAL at 17:19

## 2021-02-25 RX ADMIN — Medication 20 MG: at 05:51

## 2021-02-25 RX ADMIN — FUROSEMIDE 40 MG: 40 TABLET ORAL at 09:09

## 2021-02-25 RX ADMIN — LISINOPRIL 10 MG: 10 TABLET ORAL at 09:10

## 2021-02-25 RX ADMIN — APIXABAN 10 MG: 5 TABLET, FILM COATED ORAL at 09:09

## 2021-02-25 RX ADMIN — ACETAMINOPHEN 975 MG: 325 TABLET ORAL at 05:50

## 2021-02-25 RX ADMIN — INSULIN LISPRO 1 UNITS: 100 INJECTION, SOLUTION INTRAVENOUS; SUBCUTANEOUS at 09:04

## 2021-02-25 RX ADMIN — Medication 2000 UNITS: at 09:08

## 2021-02-25 RX ADMIN — GLYCOPYRROLATE 1 MG: 1 TABLET ORAL at 17:18

## 2021-02-25 RX ADMIN — APIXABAN 10 MG: 5 TABLET, FILM COATED ORAL at 17:17

## 2021-02-25 RX ADMIN — POTASSIUM CHLORIDE 40 MEQ: 1500 TABLET, EXTENDED RELEASE ORAL at 17:17

## 2021-02-25 RX ADMIN — ACETAMINOPHEN 975 MG: 325 TABLET ORAL at 13:26

## 2021-02-25 RX ADMIN — MICAFUNGIN SODIUM 100 MG: 50 INJECTION, POWDER, LYOPHILIZED, FOR SOLUTION INTRAVENOUS at 17:03

## 2021-02-25 RX ADMIN — INSULIN LISPRO 2 UNITS: 100 INJECTION, SOLUTION INTRAVENOUS; SUBCUTANEOUS at 12:40

## 2021-02-25 NOTE — PLAN OF CARE
Problem: Prexisting or High Potential for Compromised Skin Integrity  Goal: Skin integrity is maintained or improved  Description: INTERVENTIONS:  - Identify patients at risk for skin breakdown  - Assess and monitor skin integrity  - Assess and monitor nutrition and hydration status  - Monitor labs   - Assess for incontinence   - Turn and reposition patient  - Assist with mobility/ambulation  - Relieve pressure over bony prominences  - Avoid friction and shearing  - Provide appropriate hygiene as needed including keeping skin clean and dry  - Evaluate need for skin moisturizer/barrier cream  - Collaborate with interdisciplinary team   - Patient/family teaching  - Consider wound care consult   Outcome: Progressing     Problem: Potential for Falls  Goal: Patient will remain free of falls  Description: INTERVENTIONS:  - Assess patient frequently for physical needs  -  Identify cognitive and physical deficits and behaviors that affect risk of falls  -  [place] fall precautions as indicated by assessment   - Educate patient/family on patient safety including physical limitations  - Instruct patient to call for assistance with activity based on assessment  - Modify environment to reduce risk of injury  - Consider OT/PT consult to assist with strengthening/mobility  Outcome: Progressing     Problem: Nutrition/Hydration-ADULT  Goal: Nutrient/Hydration intake appropriate for improving, restoring or maintaining nutritional needs  Description: Monitor and assess patient's nutrition/hydration status for malnutrition  Collaborate with interdisciplinary team and initiate plan and interventions as ordered  Monitor patient's weight and dietary intake as ordered or per policy  Utilize nutrition screening tool and intervene as necessary  Determine patient's food preferences and provide high-protein, high-caloric foods as appropriate       INTERVENTIONS:  - Monitor oral intake, urinary output, labs, and treatment plans  - Assess nutrition and hydration status and recommend course of action  - Evaluate amount of meals eaten  - Assist patient with eating if necessary   - Allow adequate time for meals  - Recommend/ encourage appropriate diets, oral nutritional supplements, and vitamin/mineral supplements  - Order, calculate, and assess calorie counts as needed  - Assess need for intravenous fluids  - Provide specific nutrition/hydration education as appropriate  - Include patient/family/caregiver in decisions related to nutrition  Outcome: Progressing     Problem: PAIN - ADULT  Goal: Verbalizes/displays adequate comfort level or baseline comfort level  Description: Interventions:  - Encourage patient to monitor pain and request assistance  - Assess pain using appropriate pain scale  - Administer analgesics based on type and severity of pain and evaluate response  - Implement non-pharmacological measures as appropriate and evaluate response  - Consider cultural and social influences on pain and pain management  - Notify physician/advanced practitioner if interventions unsuccessful or patient reports new pain  Outcome: Progressing     Problem: INFECTION - ADULT  Goal: Absence or prevention of progression during hospitalization  Description: INTERVENTIONS:  - Assess and monitor for signs and symptoms of infection  - Monitor lab/diagnostic results  - Monitor all insertion sites, i e  indwelling lines, tubes, and drains  - [place] appropriate cooling/warming therapies per order  - Administer medications as ordered  - Instruct and encourage patient and family to use good hand hygiene technique  - Identify and instruct in appropriate isolation precautions for identified infection/condition  Outcome: Progressing     Problem: SAFETY ADULT  Goal: Patient will remain free of falls  Description: INTERVENTIONS:  - Assess patient frequently for physical needs  -  Identify cognitive and physical deficits and behaviors that affect risk of falls    - [place] fall precautions as indicated by assessment   - Educate patient/family on patient safety including physical limitations  - Instruct patient to call for assistance with activity based on assessment  - Modify environment to reduce risk of injury  - Consider OT/PT consult to assist with strengthening/mobility  Outcome: Progressing  Goal: Maintain or return to baseline ADL function  Description: INTERVENTIONS:  -  Assess patient's ability to carry out ADLs; assess patient's baseline for ADL function and identify physical deficits which impact ability to perform ADLs (bathing, care of mouth/teeth, toileting, grooming, dressing, etc )  - Assess/evaluate cause of self-care deficits   - Assess range of motion  - Assess patient's mobility; develop plan if impaired  - Assess patient's need for assistive devices and provide as appropriate  - Encourage maximum independence but intervene and supervise when necessary  - Involve family in performance of ADLs  - Assess for home care needs following discharge   - Consider OT consult to assist with ADL evaluation and planning for discharge  - Provide patient education as appropriate  Outcome: Progressing  Goal: Maintain or return mobility status to optimal level  Description: INTERVENTIONS:  - Assess patient's baseline mobility status (ambulation, transfers, stairs, etc )    - Identify cognitive and physical deficits and behaviors that affect mobility  - Identify mobility aids required to assist with transfers and/or ambulation (gait belt, sit-to-stand, lift, walker, cane, etc )  - [place] fall precautions as indicated by assessment  - Record patient progress and toleration of activity level on Mobility SBAR; progress patient to next Phase/Stage  - Instruct patient to call for assistance with activity based on assessment  - Consider rehabilitation consult to assist with strengthening/weightbearing, etc   Outcome: Progressing     Problem: DISCHARGE PLANNING  Goal: Discharge to home or other facility with appropriate resources  Description: INTERVENTIONS:  - Identify barriers to discharge w/patient and caregiver  - Arrange for needed discharge resources and transportation as appropriate  - Identify discharge learning needs (meds, wound care, etc )  - Arrange for interpretive services to assist at discharge as needed  - Refer to Case Management Department for coordinating discharge planning if the patient needs post-hospital services based on physician/advanced practitioner order or complex needs related to functional status, cognitive ability, or social support system  Outcome: Progressing     Problem: Knowledge Deficit  Goal: Patient/family/caregiver demonstrates understanding of disease process, treatment plan, medications, and discharge instructions  Description: Complete learning assessment and assess knowledge base    Interventions:  - Provide teaching at level of understanding  - Provide teaching via preferred learning methods  Outcome: Progressing

## 2021-02-25 NOTE — DISCHARGE SUMMARY
Discharge- Renetta Martins 1960, 56 y o  male MRN: [id number]    Unit/Bed#: [facility] 726-01 Encounter: [id number]    Primary Care Provider: EVRONICA Dumont   Date and time admitted to hospital: 1/13/2021  2:28 PM        Prolonged Q-T interval on ECG  Assessment & Plan  -the patient has prolonged QTC of 487, patient is on methadone and fluconazole  -Patient is on methadone for years  -Patient fluconazole was switched to micafungin per y ID    Right femoral vein DVT (HCC)  Assessment & Plan  Continue therapeutic Lovenox  Eliquis script sent to patient's primary pharmacy for price check      following  If Eliquis in affordable, can switch from subcu Lovenox therapeutic dose to Eliquis while in hospital     Ischemia of right lower extremity with suspected rhabdomyolysis  Assessment & Plan  · Suspected embolic from aortic thrombus  · 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  · Continue therapeutic Lovenox  ; Eliquis scripts sent to patient's pharmacy for price check  · Currently stable from a vascular surgery standpoint    Edema of right upper extremity  Assessment & Plan  · +2 pitting edema also with significant weakness/paresis   · S/p Venous duplex on 01/26 negative for acute or chronic DVT   · Resumed back on lasix with some improvement  · X-ray right humerus negative  Oropharyngeal dysphagia  Assessment & Plan  · Extubated 1/25 and required NGT for nutrition   · Speech therapy following   · S/p barium swallow - no evidence of leakage  · Continue modified diet  · Continue robinul  · S/p ENT consult, no vocal cord motion impairment  · Status post video barium swallow on 02/16  Muscle weakness of right upper extremity  Assessment & Plan  · Profound weakness, predominantly in right upper extremity, especially due to critical illness and steroid use  · During hospitalization, stroke alert was called because of concern for worsening right upper extremity weakness    CT head showed questionable hypodensity but no evidence of acute stroke or bleeding  · Patient currently on therapeutic Lovenox  · Strength currently 2 to 3/5 at baseline  · Evaluated by Neurology  No further inpatient workup required  · Right upper extremity x-ray per family request showed no acute intraosseous abnormality  Anemia  Assessment & Plan  · Secondary to critical illness, no sign of acute blood loss  · Neg stool occult  · Hemoglobin stable around his baseline  · Continue to monitor and transfuse if < 7    Aortic thrombus (HCC)  Assessment & Plan  · With embolic event that compromised right femoral artery  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  S/p R AKA formalization 1/18   · Continue therapeutic lovenox  · F/u with vascular surgery as outpatient    Acute respiratory failure with hypoxia ([facility] [address] )  Assessment & Plan  · Secondary to COVID 19  · Extubated 1/25  · Now resolved and on room air  Type 2 diabetes mellitus, with long-term current use of insulin [facility])  Assessment & Plan  Lab Results   Component Value Date    HGBA1C 11 6 (H) 12/09/2020       Recent Labs     02/24/21  1604 02/24/21  2055 02/25/21  0735 02/25/21  1116   POCGLU 154* 154* 159* 220*       Blood Sugar Average: Last 72 hrs:  (P) 151 [id number]     · BBG on the lower side, patient does not eating much due to dysphagia  · Continue Lantus 10 units + SSI  · Discontinue 3 units Humalog t i d  With meals  BMI 28 0-28 9,adult  Assessment & Plan        Methadone maintenance therapy patient [facility])  Assessment & Plan  Chronic methadone use  Methadone was held yesterday due to lethargy yesterday  Restarted methadone today as patient requested due to significant limb pain  And he is on chronic methadone therapy  Will continue to hold gabapentin for now to avoid over sedation and lethargy  Fluconazole discontinued due to prolonged QTC with concomitant methadone fluconazole use  He on micofungin per ID until 3/12/2021 ivss    He has     HTN (hypertension)  Assessment & Plan  · Stable  · Continue lisinopril, lasix    * Left Hydropneumothorax  Assessment & Plan  Candida empyema  Status post pigtail  On 2/17/21  Now on room air  Pulmonology and thoracic surgery following  ID on board - fluconazole switched to micafungin due to prolonged QTC along with methadone use  PICC line consent obtained and will need PICC line for IV micafungin until 03/12/2021  Follow-up in the thoracic office 1 month after the discharge from the hospital with chest x-ray PA and lateral results    Acute encephalopathy-resolved as of 2/20/2021  Assessment & Plan  Now resolved  Most likely toxic metabolic due to methadone and gabapentin  Continue to hold gabapentin  Methadone restarted at home dose as patient is on chronic methadone therapy and is requesting for his pain  Avoid any further sedating medications  Urinary retention-resolved as of 2/20/2021  Assessment & Plan  Resolved             Discharging Physician / Practitioner: Jose Islas MD  PCP: Danita Rodriguez  Admission Date:   Admission Orders (From admission, onward)     Ordered        01/13/21 1430  Inpatient Admission  Once                   Discharge Date: 02/25/21    Resolved Problems  Date Reviewed: 2/25/2021          Resolved    Acute kidney injury ([address] ) 1/26/2021     Resolved by  Angie Llamas PA-C    Hypernatremia 1/25/2021     Resolved by  VERONICA Elliott    Metabolic acidosis, increased anion gap 1/26/2021     Resolved by  ANKIT Tovar-C    Urinary retention 2/20/2021     Resolved by  Manisha Bush MD    Fever 2/20/2021     Resolved by  Manisha Bush MD    Acute encephalopathy 2/20/2021     Resolved by  Manisha Bush MD          Consultations During Hospital Stay:  · ID, neurology, general surgery, pulmonary, ENT, endocrinology     Procedures Performed:   CXR:    1    Interim slight improvement in bilateral interstitial and alveolar groundglass opacification is noted consistent with Covid 19 pneumonia with possible interim improvement of superimposed edema  2   Lines and tubes as noted  CXR(1/15): 1   Stable opacities  2  Stable lines and tubes  VAS upper limb venous duplex scan, complete bilateral  Impression  RIGHT UPPER LIMB:  No evidence of acute or chronic deep vein thrombosis  No evidence of superficial thrombophlebitis noted  Doppler evaluation shows a normal response to augmentation maneuvers  LEFT UPPER LIMB:  No evidence of acute or chronic deep vein thrombosis  No evidence of superficial thrombophlebitis noted  Doppler evaluation shows a normal response to augmentation maneuvers  CXR(1/19): Worsening bilateral groundglass opacity due to Covid 19  CXR(1/22): Right jugular catheter at cavoatrial junction with no pneumothorax   No change in extensive bilateral groundglass opacity due to Covid 19 pneumonia  CXR(1/27): 1  Persistent bilateral airspace opacities suspicious for multifocal pneumonia  2   Enteric tube is not visualized (in this patient with history of check NG tube placement)  ABX(2/1): 1  Nonobstructive bowel gas pattern  NG tube is not visualized (given history of check NG tube placement)  2   Patchy bilateral opacities are again noted at the bilateral lung bases  CT head w/o contrast(2/8): No acute intracranial abnormality  Stable chronic encephalomalacia on the right  CXR(2/9): Moderate new left hydropneumothorax  Persistent extensive bilateral groundglass opacity, likely related to Covid 19  IR chest tube placement (2/10/2021): 1  Successful placement of a therapeutic and [place] all-purpose drainage catheter into the left pleural space under ultrasound guidance  CXR(2/10): Left pigtail insertion with drainage of the left effusion  Increase in size of left pneumothorax, now moderate  Persistent extensive bilateral groundglass opacity as a result of Covid-19 pneumonia,  Likely post infectious scar  CXR(2/11):  Left pigtail catheter with minimal decrease in moderate left pneumothorax  Persistent extensive bilateral groundglass opacity, likely post infectious scar from Covid 19 pneumonia  CXR(2/11): No change in moderate left pneumothorax    Question development of pneumomediastinum      CXR(2/12): Slight increase in moderate left pneumothorax  CT chest wo contrast (2/12): There is a moderate-sized left hydropneumothorax which appears at least partially loculated  This appears similar in size to the February 12, 2021 plain film examination  A smallbore pigtail chest tube catheter is in place in the posterolateral lower   left hemithorax pleural space; the metallic marker is located just lateral to the rib margin  Extensive areas of dense consolidation are present bilaterally within the lungs  This appears significantly worse compared with January 12, 2021    The blood in the cardiac chambers appears somewhat hypodense relative to the ventricular walls  This suggests anemia  Correlation with hemoglobin/hematocrit levels is recommended  Cholelithiasis noted  CXR: No significant change in moderate left pneumothorax  Left basilar pleural pigtail catheter is present  There is now subcutaneous emphysema in the left lateral chest wall  Clinical correlation and follow-up is recommended  Stable extensive bilateral groundglass opacity  CTA head and neck w wo contrast: No large vessel flow restrictive disease within the head or neck  No evidence of high-grade proximal stenosis of the visualized Craig of [place]  No evidence of acute arterial dissection      Portions of the right transverse sinus are not well visualized, however, the left-sided transverse sinus is prominent and appears dominant    If there is clinical concern for acute pathology involving the right transverse sinus, MRV could be performed for   further evaluation, if there are no contraindications      There is subcutaneous emphysema in the left chest wall and tracking into the soft tissues of the posterior left neck  This is new compared with a CT of the chest performed February 13, 2021 at 2:57 AM     CT stroke alert brain(2/13): No acute intracranial hemorrhage  Questionable subtle hypodensity superior aspect left cerebral peduncle  MRI with diffusion-weighted imaging is recommended for further evaluation, if there are no contraindications  CXR(2/14): Persistent but mildly diminished in size left-sided pneumothorax; no other significant change    CXR(2/15): No change in moderate left pneumothorax  Left pleural effusion on recent CT not visible  Persistent extensive bilateral consolidation and groundglass opacity related to Covid 19 pneumonia/post infectious scar  FL barium swallow with video w speech    CXR(2/17): Persistent left pneumothorax, mildly increased in size  Left pleural fluid, moderately increased in amount    CXR(2/17): Left hydropneumothorax, unchanged after removal of pigtail left chest drainage catheter  Barium swallow(2/16): no leak    XR humerus right: No acute osseous abnormality  Significant Findings / Test Results:   ·  as above    Incidental Findings:   · None     Test Results Pending at Discharge (will require follow up): · None      Outpatient Tests Requested:  · None     Complications:  None     Reason for Admission:      Hospital Course: As per Dr Adali Ayers and P,"Ben Alvarez is a 56 y  o  male, with a history significant for diabetes mellitus type 2, hypertension, hyperlipidemia, hepatitis-C, GERD, coronavirus diagnosis on 12/31, who presents, as a transfer, for evaluation for vascular surgery due to acute limb ischemia of the right lower extremity   This is believed to be secondary to emboli from aortic thrombus to this limb       Patient initially presented [facility] on 1/11 with nausea, vomiting, shortness of breath, altered mental status   Prior to arrival, patient was unable to eat, drink, or take medication secondary to his nausea/vomiting   He is found to be in DKA and he was subsequently transferred to [facility] hospitalized, patient had worsening respiratory status and was intubated on 01/12   Prior to this event, his hemoglobin drawn and he had developed hematemesis with "coffee-ground" appearing emesis   Patient was evaluated by gastroenterology; his hemoglobin stabilized and his OG tube showed no evidence of blood; he was cleared for anticoagulation from their standpoint   Furthermore, while hospitalized, patient developed hematuria and was evaluated by urology; they performed cystoscopy, cleared clots, and recommended that a 3 way Veras be placed and left in for 7 days        Notably, the patient's right lower extremity is pale and cool and vascular surgery was consulted   The patient was found to have acute limb ischemia, suspected to be secondary to emboli from aortic thrombus on CTA , and he was transferred for evaluation for potential surgical intervention      In regards to the DKA, the anion gap is closed and the patient's glucose has normalized      Gastroenterology, Urology, Vascular surgery have evaluated      Patient is currently being ventilated  [place], [address], 6, 60  [place] [facility] [address]       Over the last day, patient's inflammatory markers have been increasing    from 01/08, D-dimer 18 65 from 8, procalcitonin 1 6 for from 0 31, ferritin 1608 from 1314  Patient also noted to have thrombocytopenia that predated initiation of heparin   Patient is also hypernatremic on presentation and it was rapidly corrected   Moderate bacteria seen on UA microscopy      Venous duplex on 01/12 revealed acute nonocclusive deep vein thrombosis of the right lower extremity   CTA on 01/12 chest abdomen pelvis revealed pulmonary emboli "     "Summary of clinical course in ICU:   Patient underwent a right AKA amputation and was continued on IV antibiotics and fluid resuscitated for rhabdo  He was then started on TPN  He had to be diuresed secondary to increased fluid status  On the 18th he was taken back to the OR for formalization of the AKA for washout and consequently was started on IV methadone and gabapentin  He continued with and received Vanco and cefepime x5 days  On the 25th of January was able to weaned off the ventilator and extubated a continue to require supplemental oxygen  Once extubated he was noted to have problems speaking secondary to vocal cord injury  ENT was consulted along with speech  Patient was unable to speak or swallow and an NG tube was placed for tube feedings  He was noted to have adrenal insufficiency and received hydrocortisone and completed a taper today  He remains hemodynamically stable and saturating well on 5 L Midflow  HE is stable for downgrade to SD 2 "     Patient has pneumonia due to covid 19 virus completed cefepime and vancomycin  He has completed treatment, but did not receive remdesivir or plasma due to late stage of the illness  He has hydropneumothorax s/p chest tube placement  He was on 2-3 liters oxygen  He has developed left sided candidal empyema possible due to esophageal perforation  He is on caspofungin  He was initially placed on fluconazole, due to methadone and other medications his QTc is prolonged  It was switched to micafungin until 3/12/2021(four weeks)  Recheck CBC and CMP weekly  He is hemodynamically stable  He would need pulmonary follow up  Monitor blood glucose and adjust insulin as needed  Unstageable sacral decubitus ulcer, would need wound care                Condition at Discharge: stable     Discharge Day Visit / Exam:     * Please refer to separate progress note for these details *    Discussion with Family: informed regarding dc    Discharge instructions/Information to patient and family:   See after visit summary for information provided to patient and family        Provisions for Follow-Up Care:  See after visit summary for information related to follow-up care and any pertinent home health orders  Disposition:     Other [facility]      For Discharges to [facility] SNF:   · snf - Unable to speak with physician, but message left  Planned Readmission: no     Discharge Statement:  I spent 120 minutes discharging the patient  This time was spent on the day of discharge  I had direct contact with the patient on the day of discharge  Greater than 50% of the total time was spent examining patient, answering all patient questions, arranging and discussing plan of care with patient as well as directly providing post-discharge instructions  Additional time then spent on discharge activities  Discharge Medications:  See after visit summary for reconciled discharge medications provided to patient and family        ** Please Note: This note has been constructed using a voice recognition system **

## 2021-02-25 NOTE — RESTORATIVE TECHNICIAN NOTE
Restorative Specialist Mobility Note       Activity: Chair, Dangle, Stand at bedside(Educated/encouraged pt to get In/OOB with assistance  Bed alarm on  Pt callbell, phone/tray within reach )     Assistive Device: Wheelchair, Other (Comment)(Assist x2 stand-pivot back to bed   Assisted PT Corrinne Bernard )         Shawna GUALLPA, Restorative Technician, [employer]

## 2021-02-25 NOTE — TRANSPORTATION MEDICAL NECESSITY
Section I - General Information    Name of Patient: Barry Farley                 : 1960    Medicare #: [id number]  Transport Date: 21 (PCS is valid for round trips on this date and for all repetitive trips in the 60-day range as noted below )  Origin: [address] [facility]                                                         Destination: [facility]  Is the pt's stay covered under Medicare Part A (PPS/DRG)   []     Closest appropriate facility? If no, why is transport to more distant facility required? Yes  If hospice pt, is this transport related to pt's terminal illness? NA     Section II - Medical Necessity Questionnaire  Ambulance transportation is medically necessary only if other means of transport are contraindicated or would be potentially harmful to the patient  To meet this requirement, the patient must either be "bed confined" or suffer from a condition such that transport by means other than ambulance is contraindicated by the patient's condition  The following questions must be answered by the medical professional signing below for this form to be valid:    1)  Describe the MEDICAL CONDITION (physical and/or mental) of this patient AT [address] [place] [address] that requires the patient to be transported in an ambulance and why transport by other means is contraindicated by the patient's condition: Left Hydropneumothorax; evolved sacral DTI--2 residual wounds; R groin wound; R thigh wound, skin tear on R arm; pressure injury sacrum mid; pain on R limb; s/p AKA; non-ambulatory; fall risk    2) Is the patient "bed confined" as defined below? Yes  To be "be confined" the patient must satisfy all three of the following conditions: (1) unable to get up from bed without Assistance; AND (2) unable to ambulate; AND (3) unable to sit in a chair or wheelchair      3) Can this patient safely be transported by car or wheelchair van (i e , seated during transport without a medical attendant or monitoring)? No    4) In addition to completing questions 1-3 above, please check any of the following conditions that apply*:   *Note: supporting documentation for any boxes checked must be maintained in the patient's medical records  If hosp-hosp transfer, describe services needed at 2nd facility not available at 1st facility? Moderate/severe pain on movement   Medical attendant required   Special handling/isolation/infection control precautions required   Unable to tolerate seated position for time needed to transport   Other(specify) fall risk    Section III - Signature of Physician or Healthcare Professional  I certify that the above information is true and correct based on my evaluation of this patient, and represent that the patient requires transport by ambulance and that other forms of transport are contraindicated  I understand that this information will be used by the Centers for Medicare and Medicaid Services (CMS) to support the determination of medical necessity for ambulance services, and I represent that I have personal knowledge of the patient's condition at time of transport  []  If this box is checked, I also certify that the patient is physically or mentally incapable of signing the ambulance service's claim and that the institution with which I am affiliated has furnished care, services, or assistance to the patient  My signature below is made on behalf of the patient pursuant to 42 CFR §424 36(b)(4)   In accordance with 42 CFR §424 37, the specific reason(s) that the patient is physically or mentally incapable of signing the claim form is as follows:     Signature of Physician* or Healthcare Professional_______________________________________________  Signature Date 02/25/21 (For scheduled repetitive transports, this form is not valid for transports performed more than 60 days after this date)    Printed Name & Credentials of Physician or Healthcare Professional (MD, DO, RN, etc ) LUIS Ferrara    *Form must be signed by patient's attending physician for scheduled, repetitive transports   For non-repetitive, unscheduled ambulance transports, if unable to obtain the signature of the attending physician, any of the following may sign (choose appropriate option below)  [] Physician Assistant []  Clinical Nurse Specialist []  Registered Nurse  []  Nurse Practitioner  [x] Discharge Planner

## 2021-02-25 NOTE — CASE MANAGEMENT
Received a call from Selam Hutosn at [facility] re[facility] who states authorization was received from Kishore Peterson at [facility]  Auth# [id number]    Selam Hutson states pt is accepted and a bed is available today  CM arranged with [facility] for a 6pm dc to [facility]  CM notified pt, pt's nephmirna Machuca at [facility], and pt's bedside RN Luis Fernando of dc time  Facility transfer form and CMN completed  CMN signed and placed in medical record bin

## 2021-02-25 NOTE — PROGRESS NOTES
Attempted to follow up visit patient in different room before  Provided supportive presence and conversation with patient  Patient expressed to his appreciate for stopping by his room       02/25/21 1100   Clinical Encounter Type   Visited With Patient   Routine Visit Follow-up

## 2021-02-25 NOTE — RESTORATIVE TECHNICIAN NOTE
Restorative Specialist Mobility Note       Activity: Chair, Dangle, Stand at bedside, Other (Comment)(Educated/encouraged pt to get In/OOB with assistance  Assisted pt to the wheelchair stand-pivot )     Assistive Device: Wheelchair, Other (Comment)(HHA x2 OOB to the wheelchair stand-pivot   Assisted PT Aniket Encinas )       Patricia Nevarez BS, Restorative Technician, [employer]

## 2021-02-25 NOTE — PROGRESS NOTES
Progress Note - Infectious Disease   Sara Zamora 56 y o  male MRN: [id number]  Unit/Bed#: [facility] 726-01 Encounter: [id number]      Impression/Plan:  1  Left-sided candidal empyema-unclear primary process   No clear evidence of an esophageal perforation at least clinically and radiographically   Consideration for the possibility of a pneumonia with colonization of the airways with Candida and a spontaneous pneumothorax contaminating the pleural space   Consideration for the possibility of a transient fungemia which is now resolved   Fortunately the patient remains hemodynamically stable   He has undergone chest tube drainage and is receiving tPA installations   He has clinically improved   Tolerating antifungals without difficulty   EKG with prolonged QT in the setting of methadone and fluconazole so need to change back to micafungin   Patient has now had chest tube removed  -continue micafungin through 3/12/2022 to complete 4 weeks total treatment  -no additional fluconazole  -may eventually need decortication  -recheck CBC with diff and CMP at least weekly while on treatment  -pulmonary follow-up     2  COVID 19 pneumonia-status post the initiation of the severe treatment protocol   Patient has now completed that treatment  Marilee Contreras did not receive any antivirals such as remdesivir or plasma due to the late phase of illness in which treatment was initiated   Is much improved clinically  -no additional COVID-19 directed treatment  -monitor respiratory status     3  Acute right lower extremity ischemia-with suspected rhabdomyolysis   The patient is now status 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  Marilee Contreras has been continuing on anticoagulation  No local wound cellulitis noted  -no additional directed antibiotics  -vascular follow-up  -local wound care     4  Diabetes mellitus-type 2 with long-term insulin use and hyperglycemia     5   Sacral decubitus ulcer-unstageable   Local wound care    Antibiotics:  Micafungin 9  Antifungals 13    Subjective:  Patient has no fever, chills, sweats; no nausea, vomiting, diarrhea; no cough, shortness of breath; no pain  No new symptoms  He seems in reasonable spirits  Objective:  Vitals:  Temp:  [97 6 °F (36 4 °C)-98 5 °F (36 9 °C)] 97 6 °F (36 4 °C)  HR:  [80-82] 82  Resp:  [17-18] 17  BP: (122-135)/(77-80) 128/80  SpO2:  [97 %-99 %] 99 %  Temp (24hrs), Av 2 °F (36 8 °C), Min:97 6 °F (36 4 °C), Max:98 5 °F (36 9 °C)  Current: Temperature: 97 6 °F (36 4 °C)    Physical Exam:   General Appearance:  Alert, interactive, nontoxic, no acute distress  Throat: Oropharynx moist without lesions  Lungs:   Clear to auscultation bilaterally; no wheezes, rhonchi or rales; respirations unlabored   Heart:  RRR; no murmur, rub or gallop   Abdomen:   Soft, non-tender, non-distended, positive bowel sounds  Extremities: No clubbing, cyanosis or edema   Skin: No new rashes or lesions  No draining wounds noted  Labs, Imaging, & Other studies:   All pertinent labs and imaging studies were personally reviewed  Results from last 7 days   Lab Units 21  0600 21  0621  1100  21  0402   WBC Thousand/uL 11 03* 10 14  --   --  10 20*   HEMOGLOBIN g/dL 9 3* 8 7* 8 5*   < > 8 0*   PLATELETS Thousands/uL 388 390  --   --  389    < > = values in this interval not displayed       Results from last 7 days   Lab Units 21  0600 21  0606 21  0455   SODIUM mmol/L 144 140 143   POTASSIUM mmol/L 3 0* 3 6 3 8   CHLORIDE mmol/L 112* 107 108   CO2 mmol/L 26 30 29   BUN mg/dL 13 13 9   CREATININE mg/dL 0 33* 0 42* 0 50*   EGFR ml/min/1 73sq m 138 125 116   CALCIUM mg/dL 7 4* 8 4 8 0*   AST U/L 12  --   --    ALT U/L 8*  --   --    ALK PHOS U/L 96  --   --

## 2021-02-25 NOTE — ASSESSMENT & PLAN NOTE
Chronic methadone use  Methadone was held yesterday due to lethargy yesterday  Restarted methadone today as patient requested due to significant limb pain  And he is on chronic methadone therapy  Will continue to hold gabapentin for now to avoid over sedation and lethargy  Fluconazole discontinued due to prolonged QTC with concomitant methadone fluconazole use  He on micofungin per ID until 3/12/2021 ivss    He has

## 2021-02-25 NOTE — PLAN OF CARE
Problem: PHYSICAL THERAPY ADULT  Goal: Performs mobility at highest level of function for planned discharge setting  See evaluation for individualized goals  Description: Treatment/Interventions: Functional transfer training, LE strengthening/ROM, Therapeutic exercise, Endurance training, Bed mobility          See flowsheet documentation for full assessment, interventions and recommendations  Outcome: Progressing  Note: Prognosis: Good  Problem List: Decreased strength, Decreased endurance, Impaired balance, Decreased mobility, Decreased skin integrity  Assessment: Continues to make significant progress towards therapy goals in activity tolerance and ability to perform functional mobility  Pt performed bed mobility without any physical assistance; did require increased time and use of bed rails  Pt able to perform 2 sit pivot transfers with max A x 2 to and from the [facility]  Pt was able to self-propel himself ~100 feet x  2 with min A x 1 using both UE  Limited by activity tolerance/endurance and needed 3 rest breaks  SpO2 desat to 87-89% on RA and noted increased WOB  Able to maintain >90% SpO2 on 1L NC, RN made aware  Overall, patient is making steady progress towards therapy goals  Recommend STR upon discharge once medically stable  Barriers to Discharge: Decreased caregiver support, Inaccessible home environment     PT Discharge Recommendation: [address]     PT - OK to Discharge: Yes    See flowsheet documentation for full assessment

## 2021-02-25 NOTE — ASSESSMENT & PLAN NOTE
Lab Results   Component Value Date    HGBA1C 11 6 (H) 12/09/2020       Recent Labs     02/24/21  1604 02/24/21  2055 02/25/21  0735 02/25/21  1116   POCGLU 154* 154* 159* 220*       Blood Sugar Average: Last 72 hrs:  (P) 151 [id number]     · BBG on the lower side, patient does not eating much due to dysphagia  · Continue Lantus 10 units + SSI  · Discontinue 3 units Humalog t i d  With meals

## 2021-02-25 NOTE — PHYSICAL THERAPY NOTE
Physical Therapy Treatment Note     Patient Name: Ivonne Ovalle    TCIJW'E Date: 2/25/2021     Problem List  Principal Problem:    Left Hydropneumothorax  Active Problems:    HTN (hypertension)    Methadone maintenance therapy patient ([facility] )    BMI 28 0-28 9,adult    Type 2 diabetes mellitus, with long-term current use of insulin (HCC)    Acute respiratory failure with hypoxia (HCC)    Aortic thrombus (HCC)    Anemia    Muscle weakness of right upper extremity    Oropharyngeal dysphagia    Edema of right upper extremity    Ischemia of right lower extremity with suspected rhabdomyolysis    Right femoral vein DVT ([facility])    RUE weakness    Prolonged Q-T interval on ECG       Past Medical History  Past Medical History:   Diagnosis Date    Diabetes mellitus ([place] [address] )     GERD (gastroesophageal reflux disease)     Hypercholesteremia     Hypertension     Methadone use ([place] [address] )         Past Surgical History  Past Surgical History:   Procedure Laterality Date    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    Procedure: AMPUTATION ABOVE KNEE (AKA);   Surgeon: Noelle Alberto MD;  [facility];  Service: Vascular    AMPUTATION ABOVE KNEE (AKA) Right 1/18/2021    Procedure: AMPUTATION ABOVE KNEE (AKA) FORMALIZATION,  R AKA wound washout, wound closure;  Surgeon: Noelle Alberto MD;  [facility];  Service: Vascular    ARTERIOGRAM Right 1/13/2021    Procedure: ARTERIOGRAM;  Surgeon: Yvonne Stroud DO;  [facility];  Service: Vascular    IR CHEST TUBE PLACEMENT  2/10/2021    IR LOWER EXTREMITY ANGIOGRAM  1/14/2021    THROMBECTOMY W/ EMBOLECTOMY Right 1/13/2021    Procedure: EMBOLECTOMY/THROMBECTOMY LOWER EXTREMITY;  Surgeon: Yvonne Stroud DO;  [facility];  Service: Vascular         02/25/21 1033   PT Last Visit   PT Visit Date 02/25/21   Note Type   Note Type Treatment   Pain Assessment   Pain Assessment Tool 0-10   Pain Score No Pain   Restrictions/Precautions   Weight Bearing Precautions Per Order Yes   RLE Weight Bearing Per Order NWB  (s/p R AKA)   Other Precautions Chair Alarm; Fall Risk;O2;Bed Alarm   General   Chart Reviewed Yes   Cognition   Arousal/Participation Alert; Responsive; Cooperative   Attention Within functional limits   Orientation Level Oriented X4   Memory Decreased recall of precautions   Following Commands Follows all commands and directions without difficulty   Comments Patient is very pleasant and cooperative  Always willing to participate in therapy  At times, requires encourgment as pt is upset about his current level of function   Bed Mobility   Rolling R 5  Supervision   Additional items Assist x 1; Increased time required;LE management; Bedrails   Rolling L 5  Supervision   Additional items Assist x 1; Increased time required; Bedrails   Supine to Sit 5  Supervision   Additional items HOB elevated; Bedrails   Sit to Supine 5  Supervision   Additional items HOB elevated; Bedrails   Additional Comments Pt able to complete all bed mobility without phyiscal assistance except for forward scooting towards the EOB  For all bed mobility, requires increased time and use of bed rails to complete  Performed x2 supine<> sit tranfers  Concluded session with pt supine in bed with all needs in reach   Transfers   Sit to Stand 2  Maximal assistance   Additional items Assist x 2; Increased time required;Verbal cues   Stand to Sit 2  Maximal assistance   Additional items Assist x 2; Increased time required;Verbal cues   Sit pivot 2  Maximal assistance   Additional items Assist x 2; Increased time required;Verbal cues   Additional Comments Completed two sit pivot transfers with max A x 2 to and from [facility]  Pt able to bear weight through LLE to assist  Limited by fear of falls  L knee blocked      Ambulation/Elevation   Gait pattern Not appropriate   Wheelchair Activities   Wheelchair Cushion None   Wheelchair Parts Management Yes   Propulsion Yes   Propulsion Type 1 Manual   Level 1 Level tile   Method 1 Left upper extremity;Right upper extremity   Level of Assistance 1 Minimum assistance;Minimal verbal cues   Description/ Details 1 WC propulsion - 55 feet with min A x 1 with use of BUE  Min A to assist with propulsion using RUE due to decreased RUE weakness  Able to maintain a stright pathway during pulpusion  Rest break 2* fatigue and FABIAN  SpO2 desat to 51617% on RA and noted increased WOB, RN notified  Propulsion 2   Propulsion Type 2 Manual   Level 2 Level tile   Method 2 Left upper extremity;Right upper extremity   Level of Assistance 2 Minimum assistance;Minimal verbal cues  (Supervision for rotaional turn using LUE only)   Description/Details 2 WC propulsion ~40 feet with min A x 1 to assist with RUE and mild force production  Including 1 rotational turn, supervision level, using only LUE to complete turn  Rest break 2* fatigue   Propulsion 3   Propulsion Type 3 Manual   Level 3 Level tile   Method 3 Right upper extremity; Left upper extremity   Level of Assistance 3 Minimum assistance   Description/ Details 3 WC propulsion of 50 feet with min A x 1, assist with mild force production  Assistance to avoid obstacles   Propulsion 4   Propulsion Type 4 Manual   Level 4 Level tile   Method 4 Right upper extremity; Left upper extremity   Level of Assistance 4 Minimum assistance;Minimal verbal cues   Description/ Details 4 WC propulsion of 30 feet with min A x 1 for mild force production, cues to negotiate through tight spaces and performed 1 half turn    Balance   Static Sitting Fair -   Dynamic Sitting Poor +   Static Standing Poor -   Dynamic Standing Poor -   Endurance Deficit   Endurance Deficit Yes   Endurance Deficit Description fatigue, WOB   Activity Tolerance   Activity Tolerance Patient limited by fatigue   Medical Staff Made Aware Spoke with OT, CM   Nurse Made Aware Yes   Assessment   Prognosis Good   Problem List Decreased strength;Decreased endurance; Impaired balance;Decreased mobility; Decreased skin integrity   Assessment Continues to make significant progress towards therapy goals in activity tolerance and ability to perform functional mobility  Pt performed bed mobility without any physical assistance; did require increased time and use of bed rails  Pt able to perform 2 sit pivot transfers with max A x 2 to and from the [facility]  Pt was able to self-propel himself ~100 feet x  2 with min A x 1 using both UE  Limited by activity tolerance/endurance and needed 3 rest breaks  SpO2 desat to 87-89% on RA and noted increased WOB  Able to maintain >90% SpO2 on 1L NC, RN made aware  Overall, patient is making steady progress towards therapy goals  Recommend STR upon discharge once medically stable  Barriers to Discharge Decreased caregiver support; Inaccessible home environment   Goals   Patient Goals To get better   STG Expiration Date 02/26/21   Short Term Goal #2 Additional PT goals: 1) Perform WC mobility >350 feet independently using BUE  2) Manage WC breaks, arm rests, and leg rests independently  PT Treatment Day 10   Plan   Treatment/Interventions LE strengthening/ROM; Functional transfer training; Therapeutic exercise; Endurance training;Cognitive reorientation;Patient/family training;Bed mobility;Gait training   Progress Progressing toward goals   PT Frequency   (3-5x/wk)   Recommendation   PT Discharge Recommendation Post-Acute Rehabilitation Services   Equipment Recommended Wheelchair   PT - OK to Discharge Yes   Additional Comments to STR once medically stable   AM-PAC Basic Mobility Inpatient   Turning in Bed Without Bedrails 4   Lying on Back to Sitting on Edge of Flat Bed 3   Moving Bed to Chair 1   Standing Up From Chair 1   Walk in Room 1   Climb 3-5 Stairs 1   Basic Mobility Inpatient Raw Score 11   Basic Mobility Standardized Score 30 25   Turning Head Towards Sound 4   Follow Simple Instructions 4   Low Function Basic Mobility Raw Score 19   Low Function Basic Mobility Standardized Score 31 06       Bharath Hartley PT, DPT

## 2021-02-26 ENCOUNTER — APPOINTMENT (EMERGENCY)
Dept: RADIOLOGY | Facility: HOSPITAL | Age: 61
DRG: 177 | End: 2021-02-26
Payer: COMMERCIAL

## 2021-02-26 ENCOUNTER — APPOINTMENT (EMERGENCY)
Dept: NON INVASIVE DIAGNOSTICS | Facility: HOSPITAL | Age: 61
DRG: 177 | End: 2021-02-26
Payer: COMMERCIAL

## 2021-02-26 ENCOUNTER — TRANSITIONAL CARE MANAGEMENT (OUTPATIENT)
Dept: FAMILY MEDICINE CLINIC | Facility: CLINIC | Age: 61
End: 2021-02-26

## 2021-02-26 ENCOUNTER — HOSPITAL ENCOUNTER (INPATIENT)
Facility: HOSPITAL | Age: 61
LOS: 4 days | DRG: 177 | End: 2021-03-04
Attending: EMERGENCY MEDICINE | Admitting: INTERNAL MEDICINE
Payer: COMMERCIAL

## 2021-02-26 DIAGNOSIS — R06.02 SOB (SHORTNESS OF BREATH) ON EXERTION: ICD-10-CM

## 2021-02-26 DIAGNOSIS — I74.10 AORTIC THROMBUS (HCC): ICD-10-CM

## 2021-02-26 DIAGNOSIS — B49 FUNGAL INFECTION OF LUNG: ICD-10-CM

## 2021-02-26 DIAGNOSIS — J86.9 EMPYEMA (HCC): Primary | ICD-10-CM

## 2021-02-26 DIAGNOSIS — J94.8 HYDROPNEUMOTHORAX: ICD-10-CM

## 2021-02-26 DIAGNOSIS — R06.02 SHORTNESS OF BREATH: Primary | ICD-10-CM

## 2021-02-26 DIAGNOSIS — R60.9 EDEMA: ICD-10-CM

## 2021-02-26 DIAGNOSIS — J96.01 ACUTE RESPIRATORY FAILURE WITH HYPOXIA (HCC): ICD-10-CM

## 2021-02-26 DIAGNOSIS — R06.02 SOB (SHORTNESS OF BREATH): ICD-10-CM

## 2021-02-26 LAB
ALBUMIN SERPL BCP-MCNC: 1.5 G/DL (ref 3.5–5)
ALP SERPL-CCNC: 114 U/L (ref 46–116)
ALT SERPL W P-5'-P-CCNC: 8 U/L (ref 12–78)
ANION GAP SERPL CALCULATED.3IONS-SCNC: 2 MMOL/L (ref 4–13)
APTT PPP: 62 SECONDS (ref 23–37)
AST SERPL W P-5'-P-CCNC: 11 U/L (ref 5–45)
ATRIAL RATE: 95 BPM
BASOPHILS # BLD AUTO: 0.02 THOUSANDS/ΜL (ref 0–0.1)
BASOPHILS NFR BLD AUTO: 0 % (ref 0–1)
BILIRUB SERPL-MCNC: 0.39 MG/DL (ref 0.2–1)
BUN SERPL-MCNC: 17 MG/DL (ref 5–25)
CALCIUM ALBUM COR SERPL-MCNC: 10.7 MG/DL (ref 8.3–10.1)
CALCIUM SERPL-MCNC: 8.7 MG/DL (ref 8.3–10.1)
CHLORIDE SERPL-SCNC: 102 MMOL/L (ref 100–108)
CO2 SERPL-SCNC: 28 MMOL/L (ref 21–32)
CREAT SERPL-MCNC: 0.64 MG/DL (ref 0.6–1.3)
EOSINOPHIL # BLD AUTO: 0 THOUSAND/ΜL (ref 0–0.61)
EOSINOPHIL NFR BLD AUTO: 0 % (ref 0–6)
ERYTHROCYTE [DISTWIDTH] IN BLOOD BY AUTOMATED COUNT: 16.8 % (ref 11.6–15.1)
GFR SERPL CREATININE-BSD FRML MDRD: 105 ML/MIN/1.73SQ M
GLUCOSE SERPL-MCNC: 282 MG/DL (ref 65–140)
HCT VFR BLD AUTO: 30.3 % (ref 36.5–49.3)
HGB BLD-MCNC: 9.2 G/DL (ref 12–17)
IMM GRANULOCYTES # BLD AUTO: 0.14 THOUSAND/UL (ref 0–0.2)
IMM GRANULOCYTES NFR BLD AUTO: 1 % (ref 0–2)
INR PPP: 2.77 (ref 0.84–1.19)
LYMPHOCYTES # BLD AUTO: 0.58 THOUSANDS/ΜL (ref 0.6–4.47)
LYMPHOCYTES NFR BLD AUTO: 3 % (ref 14–44)
MCH RBC QN AUTO: 27.1 PG (ref 26.8–34.3)
MCHC RBC AUTO-ENTMCNC: 30.4 G/DL (ref 31.4–37.4)
MCV RBC AUTO: 89 FL (ref 82–98)
MONOCYTES # BLD AUTO: 0.13 THOUSAND/ΜL (ref 0.17–1.22)
MONOCYTES NFR BLD AUTO: 1 % (ref 4–12)
NEUTROPHILS # BLD AUTO: 19.14 THOUSANDS/ΜL (ref 1.85–7.62)
NEUTS SEG NFR BLD AUTO: 95 % (ref 43–75)
NRBC BLD AUTO-RTO: 0 /100 WBCS
P AXIS: 38 DEGREES
PLATELET # BLD AUTO: 393 THOUSANDS/UL (ref 149–390)
PMV BLD AUTO: 9.9 FL (ref 8.9–12.7)
POTASSIUM SERPL-SCNC: 4.8 MMOL/L (ref 3.5–5.3)
PR INTERVAL: 108 MS
PROT SERPL-MCNC: 8 G/DL (ref 6.4–8.2)
PROTHROMBIN TIME: 29.1 SECONDS (ref 11.6–14.5)
QRS AXIS: 44 DEGREES
QRSD INTERVAL: 76 MS
QT INTERVAL: 338 MS
QTC INTERVAL: 424 MS
RBC # BLD AUTO: 3.39 MILLION/UL (ref 3.88–5.62)
SODIUM SERPL-SCNC: 132 MMOL/L (ref 136–145)
T WAVE AXIS: 109 DEGREES
VENTRICULAR RATE: 95 BPM
WBC # BLD AUTO: 20.01 THOUSAND/UL (ref 4.31–10.16)

## 2021-02-26 PROCEDURE — 99285 EMERGENCY DEPT VISIT HI MDM: CPT | Performed by: EMERGENCY MEDICINE

## 2021-02-26 PROCEDURE — 71275 CT ANGIOGRAPHY CHEST: CPT

## 2021-02-26 PROCEDURE — 36415 COLL VENOUS BLD VENIPUNCTURE: CPT | Performed by: EMERGENCY MEDICINE

## 2021-02-26 PROCEDURE — 99285 EMERGENCY DEPT VISIT HI MDM: CPT

## 2021-02-26 PROCEDURE — 85610 PROTHROMBIN TIME: CPT | Performed by: EMERGENCY MEDICINE

## 2021-02-26 PROCEDURE — G1004 CDSM NDSC: HCPCS

## 2021-02-26 PROCEDURE — 85730 THROMBOPLASTIN TIME PARTIAL: CPT | Performed by: EMERGENCY MEDICINE

## 2021-02-26 PROCEDURE — 80053 COMPREHEN METABOLIC PANEL: CPT | Performed by: EMERGENCY MEDICINE

## 2021-02-26 PROCEDURE — 93010 ELECTROCARDIOGRAM REPORT: CPT | Performed by: INTERNAL MEDICINE

## 2021-02-26 PROCEDURE — 93971 EXTREMITY STUDY: CPT

## 2021-02-26 PROCEDURE — 85025 COMPLETE CBC W/AUTO DIFF WBC: CPT | Performed by: EMERGENCY MEDICINE

## 2021-02-26 PROCEDURE — 93005 ELECTROCARDIOGRAM TRACING: CPT

## 2021-02-26 RX ORDER — METHADONE HYDROCHLORIDE 10 MG/1
70 TABLET ORAL ONCE
Status: COMPLETED | OUTPATIENT
Start: 2021-02-26 | End: 2021-02-26

## 2021-02-26 RX ADMIN — METHADONE HYDROCHLORIDE 70 MG: 10 TABLET ORAL at 23:26

## 2021-02-26 RX ADMIN — SODIUM CHLORIDE 1000 ML: 0.9 INJECTION, SOLUTION INTRAVENOUS at 23:14

## 2021-02-26 RX ADMIN — IOHEXOL 85 ML: 350 INJECTION, SOLUTION INTRAVENOUS at 21:19

## 2021-02-26 NOTE — ED ATTENDING ATTESTATION
2/26/2021  I, Loco Fletcher MD, saw and evaluated the patient  I have discussed the patient with the resident/non-physician practitioner and agree with the resident's/non-physician practitioner's findings, Plan of Care, and MDM as documented in the resident's/non-physician practitioner's note, except where noted  All available labs and Radiology studies were reviewed  I was present for key portions of any procedure(s) performed by the resident/non-physician practitioner and I was immediately available to provide assistance  At this point I agree with the current assessment done in the Emergency Department  I have conducted an independent evaluation of this patient a history and physical is as follows:    ED Course         Critical Care Time  Procedures    57 yo male with hx of right aka and empyema currently being treated  Pt sent in for increased sob and increased oxygen requirement  Pt sent in to rule out dvt, pe  Pt is poor historian and believes he is here for sacral wound  pmh gerd, dm, htn, hld  Vss, afebrile, lungs cta, rrr, abdomen soft nontender, right aka with swelling,  Labs, coags, ekg, ct pe, duplex

## 2021-02-26 NOTE — ED PROVIDER NOTES
History  Chief Complaint   Patient presents with    Shortness of Breath     Pt presents as a transfer from  for r/o PE d/t hx of empyema  Pt reports intermittent SOB at this time  58-year-old male presents with shortness of breath  Patient has a history of an empyema and a recent right leg AKA and is currently in rehab at [facility]  Patient states that he is here because he has had a chronic wound on his sacrum for the past 2 months  According the chart review, patient is having shortness of breath and is now on nasal cannula oxygen  He was sent over by his rehab center  Patient is being treated for an empyema and states that he has intermittent shortness of breath  The oxygen requirement is new  Notes show that there is concern for a PE  Patient does not seem to know anything about this and has no other complaints at this time  He has not noticed any leg swelling  Prior to Admission Medications   Prescriptions Last Dose Informant Patient Reported? Taking?    Blood Glucose Monitoring Suppl (Dulce Cheng) w/Device KIT  Self No No   Sig: Use to test blood sugars 3 times daily   Diclofenac Sodium (VOLTAREN) 1 %   No No   Sig: Apply 4 g topically 4 (four) times a day   Empagliflozin (Jardiance) 25 MG TABS   No No   Sig: Take 1 tablet (25 mg total) by mouth every morning   Insulin Pen Needle 31G X 5 MM MISC  Self No No   Sig: by Does not apply route daily Pt to inject 4 X daily   Insulin Pen Needle 31G X 5 MM MISC  Self No No   Si units sq 2X daily   Lidocaine Viscous HCl (XYLOCAINE) 2 % mucosal solution   No No   Sig: Swish and spit 15 mL 4 (four) times a day as needed for mouth pain or discomfort   acetaminophen (TYLENOL) 325 mg tablet   No No   Sig: Take 3 tablets (975 mg total) by mouth every 8 (eight) hours   albuterol (PROVENTIL HFA,VENTOLIN HFA) 90 mcg/act inhaler   No No   Sig: Inhale 2 puffs every 4 (four) hours as needed for wheezing   apixaban (ELIQUIS) 5 mg   No No   Sig: Take 1 tablet (5 mg total) by mouth 2 (two) times a day   apixaban (ELIQUIS) 5 mg   No No   Sig: Take 2 tablets (10 mg total) by mouth 2 (two) times a day for 7 days   bisacodyl (DULCOLAX) 10 mg suppository   No No   Sig: Insert 1 suppository (10 mg total) into the rectum daily as needed for constipation   cholecalciferol (VITAMIN D3) 1,000 units tablet   No No   Sig: Take 2 tablets (2,000 Units total) by mouth daily   furosemide (LASIX) 40 mg tablet  Self No No   Sig: Take 1 tablet (40 mg total) by mouth daily   Patient taking differently: Take 40 mg by mouth as needed (on off work days)    glycopyrrolate (ROBINUL) 1 mg tablet   No No   Sig: Take 1 tablet (1 mg total) by mouth 3 (three) times a day   insulin glargine (LANTUS) 100 units/mL subcutaneous injection   No No   Sig: Inject 4 Units under the skin daily at bedtime   lisinopril (ZESTRIL) 10 mg tablet   No No   Sig: Take 1 tablet (10 mg total) by mouth daily   menthol-methyl salicylate (BENGAY) 27-00 % cream   No No   Sig: Apply topically 4 (four) times a day as needed (torticollis)   methadone (DOLOPHINE) 10 mg tablet   No No   Sig: Take 7 tablets by mouth daily,   micafungin 100 mg in sodium chloride 0 9 % 100 mL IVPB   No No   Sig: Infuse 100 mg into a venous catheter every 24 hours for 15 days   multivitamin-minerals (CENTRUM ADULTS) tablet   No No   Sig: Take 1 tablet by mouth daily   polyethylene glycol (MIRALAX) 17 g packet   No No   Sig: Take 17 g by mouth daily   potassium chloride (K-DUR,KLOR-CON) 20 mEq tablet   No No   Sig: Take 2 tablets (40 mEq total) by mouth daily   sitaGLIPtin-metFORMIN (JANUMET)  MG per tablet   No No   Sig: Take 1 tablet by mouth 2 (two) times a day with meals      Facility-Administered Medications: None       Past Medical History:   Diagnosis Date    Diabetes mellitus (HCC)     GERD (gastroesophageal reflux disease)     Hypercholesteremia     Hypertension     Methadone use (HCC)        Past Surgical History: Procedure Laterality Date    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    Procedure: AMPUTATION ABOVE KNEE (AKA); Surgeon: Laxmi Gardner MD;  [facility];  Service: Vascular    AMPUTATION ABOVE KNEE (AKA) Right 1/18/2021    Procedure: AMPUTATION ABOVE KNEE (AKA) FORMALIZATION,  R AKA wound washout, wound closure;  Surgeon: Laxmi Gardner MD;  [facility];  Service: Vascular    ARTERIOGRAM Right 1/13/2021    Procedure: ARTERIOGRAM;  Surgeon: Molly Barnett DO;  [facility];  Service: Vascular    IR CHEST TUBE PLACEMENT  2/10/2021    IR LOWER EXTREMITY ANGIOGRAM  1/14/2021    THROMBECTOMY W/ EMBOLECTOMY Right 1/13/2021    Procedure: EMBOLECTOMY/THROMBECTOMY LOWER EXTREMITY;  Surgeon: Molly Barnett DO;  [facility];  Service: Vascular       Family History   Problem Relation Age of Onset    Diabetes Mother     Hypertension Father     Leukemia Father     Acute lymphoblastic leukemia Father     Cancer Sister      I have reviewed and agree with the history as documented  E-Cigarette/Vaping    E-Cigarette Use Never User      E-Cigarette/Vaping Substances    Nicotine No     THC No     CBD No     Flavoring No     Other No     Unknown No      Social History     Tobacco Use    Smoking status: Former Smoker    Smokeless tobacco: Never Used   Substance Use Topics    Alcohol use: Not Currently    Drug use: No     Comment: methadone        Review of Systems   Constitutional: Negative for appetite change, chills, fatigue and fever  HENT: Negative  Eyes: Negative  Respiratory: Positive for shortness of breath  Negative for cough and chest tightness  Cardiovascular: Negative for chest pain and palpitations  Gastrointestinal: Negative for abdominal pain, diarrhea, nausea and vomiting  Endocrine: Negative  Genitourinary: Negative for difficulty urinating and hematuria  Musculoskeletal: Negative for arthralgias and myalgias     Skin: Positive for wound  Negative for pallor and rash  Allergic/Immunologic: Negative  Neurological: Negative for dizziness, weakness, light-headedness and headaches  Hematological: Negative  Physical Exam  ED Triage Vitals   Temperature Pulse Respirations Blood Pressure SpO2   02/26/21 1603 02/26/21 1603 02/26/21 1604 02/26/21 1603 02/26/21 1603   98 1 °F (36 7 °C) 90 18 142/83 98 %      Temp Source Heart Rate Source Patient Position - Orthostatic VS BP Location FiO2 (%)   02/26/21 1603 02/26/21 1603 02/26/21 1603 02/26/21 1603 --   Oral Monitor Sitting Right arm       Pain Score       02/26/21 1945       No Pain             Orthostatic Vital Signs  Vitals:    02/26/21 1630 02/26/21 1730 02/26/21 1945 02/26/21 2100   BP: 128/77 141/82 150/81 160/77   Pulse: 94 90 68 72   Patient Position - Orthostatic VS: Sitting Sitting Lying Lying       Physical Exam  Vitals signs and nursing note reviewed  Constitutional:       Appearance: Normal appearance  He is ill-appearing  HENT:      Head: Normocephalic and atraumatic  Eyes:      Conjunctiva/sclera: Conjunctivae normal    Neck:      Musculoskeletal: Normal range of motion and neck supple  Cardiovascular:      Rate and Rhythm: Normal rate and regular rhythm  Heart sounds: No murmur  Pulmonary:      Effort: Pulmonary effort is normal  No respiratory distress  Breath sounds: Normal breath sounds  No decreased breath sounds, wheezing, rhonchi or rales  Comments: On O2 NC  Musculoskeletal: Normal range of motion  Right lower leg: No edema  Left lower leg: No edema  Comments: Right AKA   Skin:     General: Skin is warm and dry  Comments: Stage II sacral ulcer  No concern for infection  Neurological:      General: No focal deficit present  Mental Status: He is alert and oriented to person, place, and time           ED Medications  Medications   methadone (DOLOPHINE) tablet 70 mg (has no administration in time range)   sodium chloride 0 9 % bolus 1,000 mL (has no administration in time range)   iohexol (OMNIPAQUE) 350 MG/ML injection (SINGLE-DOSE) 85 mL (85 mL Intravenous Given 2/26/21 2119)       Diagnostic Studies  Results Reviewed     Procedure Component Value Units Date/Time    CBC and differential [[id number]]  (Abnormal) Collected: 02/26/21 1628    Lab Status: Final result Specimen: Blood from Central Venous Line Updated: 02/26/21 1747     WBC 20 01 Thousand/uL      RBC 3 39 Million/uL      Hemoglobin 9 2 g/dL      Hematocrit 30 3 %      MCV 89 fL      MCH 27 1 pg      MCHC 30 4 g/dL      RDW 16 8 %      MPV 9 9 fL      Platelets 292 Thousands/uL      nRBC 0 /100 WBCs      Neutrophils Relative 95 %      Immat GRANS % 1 %      Lymphocytes Relative 3 %      Monocytes Relative 1 %      Eosinophils Relative 0 %      Basophils Relative 0 %      Neutrophils Absolute 19 14 Thousands/µL      Immature Grans Absolute 0 14 Thousand/uL      Lymphocytes Absolute 0 58 Thousands/µL      Monocytes Absolute 0 13 Thousand/µL      Eosinophils Absolute 0 00 Thousand/µL      Basophils Absolute 0 02 Thousands/µL     Narrative: This is an appended report  These results have been appended to a previously verified report      Comprehensive metabolic panel [[id number]]  (Abnormal) Collected: 02/26/21 1628    Lab Status: Final result Specimen: Blood from Central Venous Line Updated: 02/26/21 1724     Sodium 132 mmol/L      Potassium 4 8 mmol/L      Chloride 102 mmol/L      CO2 28 mmol/L      ANION GAP 2 mmol/L      BUN 17 mg/dL      Creatinine 0 64 mg/dL      Glucose 282 mg/dL      Calcium 8 7 mg/dL      Corrected Calcium 10 7 mg/dL      AST 11 U/L      ALT 8 U/L      Alkaline Phosphatase 114 U/L      Total Protein 8 0 g/dL      Albumin 1 5 g/dL      Total Bilirubin 0 39 mg/dL      eGFR 105 ml/min/1 73sq m     Narrative:      [place] guidelines for Chronic Kidney Disease (CKD):     Stage 1 with normal or high GFR (GFR > 90 mL/min/1 73 square meters)    Stage 2 Mild CKD (GFR = 60-89 mL/min/1 73 square meters)    Stage 3A Moderate CKD (GFR = 45-59 mL/min/1 73 square meters)    Stage 3B Moderate CKD (GFR = 30-44 mL/min/1 73 square meters)    Stage 4 Severe CKD (GFR = 15-29 mL/min/1 73 square meters)    Stage 5 End Stage CKD (GFR <15 mL/min/1 73 square meters)  Note: GFR calculation is accurate only with a steady state creatinine    Protime-INR [[id number]]  (Abnormal) Collected: 02/26/21 1628    Lab Status: Final result Specimen: Blood from Central Venous Line Updated: 02/26/21 1722     Protime 29 1 seconds      INR 2 77    APTT [[id number]]  (Abnormal) Collected: 02/26/21 1628    Lab Status: Final result Specimen: Blood from Central Venous Line Updated: 02/26/21 1722     PTT 62 seconds                  CTA ED chest PE Study   Final Result by Christopher Green MD (02/26 2225)      No evidence of pulmonary embolus      Decreasing size of loculated left hydropneumothorax following removal of removal of pigtail drainage catheter  Trace right hydropneumothorax  Comparing to prior exams increasing architectural distortion, bronchiectasis and suspected honeycombing throughout the bilateral lung bases consistent with fibrotic changes following patient's prior Covid pneumonia or alternatively an underlying    interstitial lung disease      Findings discussed with Dr Meri Castañeda at 10:25 PM, 2/26/2021                  Workstation performed: [id number]         VAS lower limb venous duplex study, unilateral/limited    (Results Pending)         Procedures  Procedures      ED Course  ED Course as of Feb 26 2312   Fri Feb 26, 2021   1807 No DVT per tech      2121 The heart rate is 95, which is normal  The rhythm is regular  The axis is normal  The P waves are normal and the NJ interval is normal  The QRS height is normal and width is normal  The ST segments are not elevated or depressed   The T waves are normal  The QT segment is normal  This ecg shows sinus rhythm  ECG 12 lead                             SBIRT 22yo+      Most Recent Value   SBIRT (25 yo +)   In order to provide better care to our patients, we are screening all of our patients for alcohol and drug use  Would it be okay to ask you these screening questions? Yes Filed at: 02/26/2021 1605   Initial Alcohol Screen: US AUDIT-C    1  How often do you have a drink containing alcohol?  0 Filed at: 02/26/2021 1605   2  How many drinks containing alcohol do you have on a typical day you are drinking? 0 Filed at: 02/26/2021 1605   3a  Male UNDER 65: How often do you have five or more drinks on one occasion? 0 Filed at: 02/26/2021 1605   Audit-C Score  0 Filed at: 02/26/2021 1605   ZACK: How many times in the past year have you    Used an illegal drug or used a prescription medication for non-medical reasons? Never Filed at: 02/26/2021 1605          Wells' Criteria for PE      Most Recent Value   Wells' Criteria for PE   Clinical signs and symptoms of DVT  0 Filed at: 02/26/2021 1474   PE is primary diagnosis or equally likely  3 Filed at: 02/26/2021 1913   HR >100  0 Filed at: 02/26/2021 1913   Immobilization at least 3 days or Surgery in the previous 4 weeks  1 5 Filed at: 02/26/2021 1913   Previous, objectively diagnosed PE or DVT  0 Filed at: 02/26/2021 1913   Hemoptysis  0 Filed at: 02/26/2021 1913   Malignancy with treatment within 6 months or palliative  0 Filed at: 02/26/2021 1913   Wells' Criteria Total  4 5 Filed at: 02/26/2021 1913            MDM  Number of Diagnoses or Management Options  Hydropneumothorax: established and improving  Shortness of breath: established and improving  Diagnosis management comments: 78-year-old male presents with shortness of breath  Will get a duplex ultrasound scan PE study  Will check labs         Amount and/or Complexity of Data Reviewed  Clinical lab tests: ordered and reviewed  Tests in the radiology section of CPT®: ordered and reviewed  Review and summarize past medical records: yes  Discuss the patient with other providers: yes    Risk of Complications, Morbidity, and/or Mortality  Presenting problems: moderate  Diagnostic procedures: moderate  Management options: moderate    Patient Progress  Patient progress: improved    Patient's workup here showed improvement of his disease  Patient's rehab center would not accept him back at this time  They said he needed to be readmitted in the morning  In the meantime, patient was admitted to [facility]  Disposition  Final diagnoses:   Shortness of breath   Hydropneumothorax     Time reflects when diagnosis was documented in both MDM as applicable and the Disposition within this note     Time User Action Codes Description Comment    2/26/2021  4:52 PM Pulse, Sarah Add [R06 02] SOB (shortness of breath)     2/26/2021  4:52 PM Pulse, Sarah Add [R60 9] Edema     2/26/2021 11:05 PM Cordelia Mihaelajermaine Herman Add [R06 02] Shortness of breath     2/26/2021 11:08 PM Melissa Genao Add [J94 8] Hydropneumothorax       ED Disposition     ED Disposition Condition Date/Time Comment    Admit Good Fri Feb 26, 2021 11:09 PM Case was discussed with Dr Trice Lassiter and the patient's admission status was agreed to be Admission Status: observation status to the service of Dr Trice Lassiter   Follow-up Information    None         Patient's Medications   Discharge Prescriptions    No medications on file     No discharge procedures on file  PDMP Review       Value Time User    PDMP Reviewed  Yes 1/12/2021  3:02 Edy Rosen MD           ED Provider  Attending physically available and evaluated Berenice Lynn I managed the patient along with the ED Attending      Electronically Signed by         Carlos Peña DO  02/26/21 8483

## 2021-02-27 PROBLEM — R09.02 HYPOXIA: Status: ACTIVE | Noted: 2021-02-27

## 2021-02-27 LAB
ANION GAP SERPL CALCULATED.3IONS-SCNC: 5 MMOL/L (ref 4–13)
BUN SERPL-MCNC: 15 MG/DL (ref 5–25)
CALCIUM SERPL-MCNC: 8.6 MG/DL (ref 8.3–10.1)
CHLORIDE SERPL-SCNC: 108 MMOL/L (ref 100–108)
CO2 SERPL-SCNC: 26 MMOL/L (ref 21–32)
CREAT SERPL-MCNC: 0.44 MG/DL (ref 0.6–1.3)
ERYTHROCYTE [DISTWIDTH] IN BLOOD BY AUTOMATED COUNT: 16.6 % (ref 11.6–15.1)
GFR SERPL CREATININE-BSD FRML MDRD: 122 ML/MIN/1.73SQ M
GLUCOSE SERPL-MCNC: 161 MG/DL (ref 65–140)
GLUCOSE SERPL-MCNC: 213 MG/DL (ref 65–140)
GLUCOSE SERPL-MCNC: 293 MG/DL (ref 65–140)
HCT VFR BLD AUTO: 26.9 % (ref 36.5–49.3)
HGB BLD-MCNC: 8.1 G/DL (ref 12–17)
MCH RBC QN AUTO: 27.2 PG (ref 26.8–34.3)
MCHC RBC AUTO-ENTMCNC: 30.1 G/DL (ref 31.4–37.4)
MCV RBC AUTO: 90 FL (ref 82–98)
PLATELET # BLD AUTO: 272 THOUSANDS/UL (ref 149–390)
PMV BLD AUTO: 10.5 FL (ref 8.9–12.7)
POTASSIUM SERPL-SCNC: 3.8 MMOL/L (ref 3.5–5.3)
RBC # BLD AUTO: 2.98 MILLION/UL (ref 3.88–5.62)
SODIUM SERPL-SCNC: 139 MMOL/L (ref 136–145)
WBC # BLD AUTO: 10.46 THOUSAND/UL (ref 4.31–10.16)

## 2021-02-27 PROCEDURE — 80048 BASIC METABOLIC PNL TOTAL CA: CPT | Performed by: INTERNAL MEDICINE

## 2021-02-27 PROCEDURE — 99220 PR INITIAL OBSERVATION CARE/DAY 70 MINUTES: CPT | Performed by: INTERNAL MEDICINE

## 2021-02-27 PROCEDURE — 82948 REAGENT STRIP/BLOOD GLUCOSE: CPT

## 2021-02-27 PROCEDURE — 99222 1ST HOSP IP/OBS MODERATE 55: CPT | Performed by: INTERNAL MEDICINE

## 2021-02-27 PROCEDURE — RECHECK: Performed by: INTERNAL MEDICINE

## 2021-02-27 PROCEDURE — 94640 AIRWAY INHALATION TREATMENT: CPT

## 2021-02-27 PROCEDURE — 85027 COMPLETE CBC AUTOMATED: CPT | Performed by: INTERNAL MEDICINE

## 2021-02-27 PROCEDURE — 94760 N-INVAS EAR/PLS OXIMETRY 1: CPT

## 2021-02-27 PROCEDURE — 94668 MNPJ CHEST WALL SBSQ: CPT

## 2021-02-27 RX ORDER — POLYETHYLENE GLYCOL 3350 17 G/17G
17 POWDER, FOR SOLUTION ORAL DAILY
Status: DISCONTINUED | OUTPATIENT
Start: 2021-02-27 | End: 2021-03-04 | Stop reason: HOSPADM

## 2021-02-27 RX ORDER — LEVALBUTEROL 1.25 MG/.5ML
1.25 SOLUTION, CONCENTRATE RESPIRATORY (INHALATION)
Status: DISCONTINUED | OUTPATIENT
Start: 2021-02-27 | End: 2021-03-04 | Stop reason: HOSPADM

## 2021-02-27 RX ORDER — LIDOCAINE HYDROCHLORIDE 20 MG/ML
15 SOLUTION OROPHARYNGEAL 4 TIMES DAILY PRN
Status: DISCONTINUED | OUTPATIENT
Start: 2021-02-27 | End: 2021-03-04 | Stop reason: HOSPADM

## 2021-02-27 RX ORDER — METHADONE HYDROCHLORIDE 10 MG/1
70 TABLET ORAL DAILY
Status: DISCONTINUED | OUTPATIENT
Start: 2021-02-27 | End: 2021-03-04 | Stop reason: HOSPADM

## 2021-02-27 RX ORDER — MUSCLE RUB CREAM 100; 150 MG/G; MG/G
CREAM TOPICAL 4 TIMES DAILY PRN
Status: DISCONTINUED | OUTPATIENT
Start: 2021-02-27 | End: 2021-03-04 | Stop reason: HOSPADM

## 2021-02-27 RX ORDER — MELATONIN
2000 DAILY
Status: DISCONTINUED | OUTPATIENT
Start: 2021-02-27 | End: 2021-03-04 | Stop reason: HOSPADM

## 2021-02-27 RX ORDER — LISINOPRIL 10 MG/1
10 TABLET ORAL DAILY
Status: DISCONTINUED | OUTPATIENT
Start: 2021-02-27 | End: 2021-03-04 | Stop reason: HOSPADM

## 2021-02-27 RX ORDER — ALBUTEROL SULFATE 90 UG/1
2 AEROSOL, METERED RESPIRATORY (INHALATION) EVERY 4 HOURS PRN
Status: DISCONTINUED | OUTPATIENT
Start: 2021-02-27 | End: 2021-03-04 | Stop reason: HOSPADM

## 2021-02-27 RX ORDER — BUDESONIDE 0.5 MG/2ML
0.5 INHALANT ORAL
Status: DISCONTINUED | OUTPATIENT
Start: 2021-02-27 | End: 2021-03-04 | Stop reason: HOSPADM

## 2021-02-27 RX ORDER — MULTIVITAMIN/IRON/FOLIC ACID 18MG-0.4MG
1 TABLET ORAL DAILY
Status: DISCONTINUED | OUTPATIENT
Start: 2021-02-27 | End: 2021-03-04 | Stop reason: HOSPADM

## 2021-02-27 RX ORDER — INSULIN GLARGINE 100 [IU]/ML
4 INJECTION, SOLUTION SUBCUTANEOUS
Status: DISCONTINUED | OUTPATIENT
Start: 2021-02-27 | End: 2021-02-28

## 2021-02-27 RX ORDER — ACETAMINOPHEN 325 MG/1
975 TABLET ORAL EVERY 8 HOURS SCHEDULED
Status: DISCONTINUED | OUTPATIENT
Start: 2021-02-27 | End: 2021-02-27

## 2021-02-27 RX ORDER — LEVALBUTEROL 1.25 MG/.5ML
1.25 SOLUTION, CONCENTRATE RESPIRATORY (INHALATION) EVERY 6 HOURS
Status: DISCONTINUED | OUTPATIENT
Start: 2021-02-27 | End: 2021-02-27

## 2021-02-27 RX ORDER — ONDANSETRON 2 MG/ML
4 INJECTION INTRAMUSCULAR; INTRAVENOUS EVERY 6 HOURS PRN
Status: DISCONTINUED | OUTPATIENT
Start: 2021-02-27 | End: 2021-03-04 | Stop reason: HOSPADM

## 2021-02-27 RX ORDER — BISACODYL 10 MG
10 SUPPOSITORY, RECTAL RECTAL DAILY PRN
Status: DISCONTINUED | OUTPATIENT
Start: 2021-02-27 | End: 2021-03-04 | Stop reason: HOSPADM

## 2021-02-27 RX ORDER — ACETAMINOPHEN 325 MG/1
650 TABLET ORAL EVERY 4 HOURS PRN
Status: DISCONTINUED | OUTPATIENT
Start: 2021-02-27 | End: 2021-03-04 | Stop reason: HOSPADM

## 2021-02-27 RX ORDER — FORMOTEROL FUMARATE 20 UG/2ML
20 SOLUTION RESPIRATORY (INHALATION)
Status: DISCONTINUED | OUTPATIENT
Start: 2021-02-27 | End: 2021-03-04 | Stop reason: HOSPADM

## 2021-02-27 RX ORDER — GLYCOPYRROLATE 1 MG/1
1 TABLET ORAL 3 TIMES DAILY
Status: DISCONTINUED | OUTPATIENT
Start: 2021-02-27 | End: 2021-03-04 | Stop reason: HOSPADM

## 2021-02-27 RX ORDER — OXYCODONE HYDROCHLORIDE 5 MG/1
2.5 TABLET ORAL EVERY 6 HOURS PRN
Status: DISCONTINUED | OUTPATIENT
Start: 2021-02-27 | End: 2021-03-04 | Stop reason: HOSPADM

## 2021-02-27 RX ORDER — GUAIFENESIN 600 MG
600 TABLET, EXTENDED RELEASE 12 HR ORAL EVERY 12 HOURS SCHEDULED
Status: DISCONTINUED | OUTPATIENT
Start: 2021-02-27 | End: 2021-03-04

## 2021-02-27 RX ADMIN — INSULIN GLARGINE 4 UNITS: 100 INJECTION, SOLUTION SUBCUTANEOUS at 21:15

## 2021-02-27 RX ADMIN — Medication 2000 UNITS: at 08:54

## 2021-02-27 RX ADMIN — ACETAMINOPHEN 650 MG: 325 TABLET, FILM COATED ORAL at 14:17

## 2021-02-27 RX ADMIN — APIXABAN 10 MG: 5 TABLET, FILM COATED ORAL at 08:54

## 2021-02-27 RX ADMIN — GLYCOPYRROLATE 1 MG: 1 TABLET ORAL at 21:11

## 2021-02-27 RX ADMIN — LEVALBUTEROL HYDROCHLORIDE 1.25 MG: 1.25 SOLUTION, CONCENTRATE RESPIRATORY (INHALATION) at 19:42

## 2021-02-27 RX ADMIN — ACETAMINOPHEN 975 MG: 325 TABLET, FILM COATED ORAL at 06:01

## 2021-02-27 RX ADMIN — Medication 1 TABLET: at 08:54

## 2021-02-27 RX ADMIN — LISINOPRIL 10 MG: 10 TABLET ORAL at 08:54

## 2021-02-27 RX ADMIN — MICAFUNGIN SODIUM 100 MG: 50 INJECTION, POWDER, LYOPHILIZED, FOR SOLUTION INTRAVENOUS at 09:00

## 2021-02-27 RX ADMIN — GLYCOPYRROLATE 1 MG: 1 TABLET ORAL at 17:04

## 2021-02-27 RX ADMIN — APIXABAN 10 MG: 5 TABLET, FILM COATED ORAL at 17:02

## 2021-02-27 RX ADMIN — METHADONE HYDROCHLORIDE 70 MG: 10 TABLET ORAL at 21:15

## 2021-02-27 RX ADMIN — IPRATROPIUM BROMIDE 0.5 MG: 0.5 SOLUTION RESPIRATORY (INHALATION) at 19:42

## 2021-02-27 RX ADMIN — FORMOTEROL FUMARATE DIHYDRATE 20 MCG: 20 SOLUTION RESPIRATORY (INHALATION) at 19:43

## 2021-02-27 RX ADMIN — ALBUTEROL SULFATE 2 PUFF: 90 AEROSOL, METERED RESPIRATORY (INHALATION) at 17:04

## 2021-02-27 RX ADMIN — GUAIFENESIN 600 MG: 600 TABLET, EXTENDED RELEASE ORAL at 21:07

## 2021-02-27 RX ADMIN — GLYCOPYRROLATE 1 MG: 1 TABLET ORAL at 09:03

## 2021-02-27 RX ADMIN — BUDESONIDE 0.5 MG: 0.5 INHALANT ORAL at 19:42

## 2021-02-27 NOTE — ASSESSMENT & PLAN NOTE
· Improving as per CTA PE study  · S/p prior pigtail, has been removed  · Continue micafungin through 03/12/2021

## 2021-02-27 NOTE — UTILIZATION REVIEW
Initial Clinical Review    Admission: Date/Time/Statement:   Admission Orders (From admission, onward)     Ordered        02/26/21 2308  Place in Observation  Once                   Orders Placed This Encounter   Procedures    Place in Observation     Standing Status:   Standing     Number of Occurrences:   1     Order Specific Question:   Level of Care     Answer:   Med Surg [16]     ED Arrival Information     Expected Arrival Acuity Means of Arrival Escorted By Service Admission Type    2/26/2021 2/26/2021 15:41 Urgent Ambulance [address] [facility] [address] Ambulance Hospitalist Urgent    Arrival Complaint    -        Chief Complaint   Patient presents with    Shortness of Breath     Pt presents as a transfer from  for r/o PE d/t hx of empyema  Pt reports intermittent SOB at this time  Assessment/Plan:    57 Yo male,   Transfer from rehab facility  (h/o  COVID requiring intubation + empyema on micafungin thru 3/12 ; chronic sacral wound, stage 2; Rt AKA  1/14 )   to [facility],  Admitted OBS status, MS level of care for  Workup of  Increased  SOB/WOB  requiring supplemental oxygen  (new)  Will R/o PE, DVT  On eliquis for prior femoral vein DVT  Frequent pulse ox readings w/supplemental oxygen titration prn   [employer] spirometry  Chronic methadone   Rt AKA w/swelling  2/26  CTA - neg for PE,  Improving left hydropneumothorax    B/l LE   (LLE limited)  Doppler  Neg  For  dvt or thrombophlebitis      ED Triage Vitals   Temperature Pulse Respirations Blood Pressure SpO2   02/26/21 1603 02/26/21 1603 02/26/21 1604 02/26/21 1603 02/26/21 1603   98 1 °F (36 7 °C) 90 18 142/83 98 %      Temp Source Heart Rate Source Patient Position - Orthostatic VS BP Location FiO2 (%)   02/26/21 1603 02/26/21 1603 02/26/21 1603 02/26/21 1603 --   Oral Monitor Sitting Right arm       Pain Score       02/26/21 1945       No Pain          Wt Readings from Last 1 Encounters:   02/26/21 66 kg (145 lb 8 1 oz)     Additional Vital Signs:   02/26/21 1730  --  90  18  141/82  105  96 %  --  --  None (Room air)  Sitting   02/26/21 1630  --  94  18  128/77  97  96 %  24  1 L/min  Nasal cannula       02/27/21 09:00:16 82 -- 122/70 87 92 % -- --   02/27/21 0600 -- -- 122/70 -- 100 % 3 L/min nc   02/27/21 0200 70 16 164/81 117 100 % 1 L/min Nasal cannula       Pertinent Labs/Diagnostic Test Results:   2/26  ekg -  SR w/short ID   2/26  cta chest - No evidence of pulmonary embolus   Decreasing size of loculated left hydropneumothorax following removal of removal of pigtail drainage catheter  Trace right hydropneumothorax  Comparing to prior exams increasing architectural distortion, bronchiectasis and suspected honeycombing throughout the bilateral lung bases consistent with fibrotic changes following patient's prior Covid pneumonia or alternatively an underlying  interstitial lung disease     2/26  bl LE venous duplex study:  No evidence of dvt or thrombophlebitis     Results from last 7 days   Lab Units 02/25/21  1458   SARS-COV-2  Negative     Results from last 7 days   Lab Units 02/27/21  0612 02/26/21  1628 02/25/21  0600 02/22/21  0606 02/21/21  1100   WBC Thousand/uL 10 46* 20 01* 11 03* 10 14   < >  --    HEMOGLOBIN g/dL 8 1* 9 2* 9 3* 8 7*  --  8 5*   HEMATOCRIT % 26 9* 30 3* 31 4* 29 9*  --  28 6*   PLATELETS Thousands/uL 272 393* 388 390   < >  --    NEUTROS ABS Thousands/µL  --  19 14* 9 14*  --   --   --     < > = values in this interval not displayed           Results from last 7 days   Lab Units 02/27/21  0612 02/26/21  1628 02/25/21  0600 02/22/21  0606 02/21/21  0455   SODIUM mmol/L 139 132* 144 140 143   POTASSIUM mmol/L 3 8 4 8 3 0* 3 6 3 8   CHLORIDE mmol/L 108 102 112* 107 108   CO2 mmol/L 26 28 26 30 29   ANION GAP mmol/L 5 2* 6 3* 6   BUN mg/dL 15 17 13 13 9   CREATININE mg/dL 0 44* 0 64 0 33* 0 42* 0 50*   EGFR ml/min/1 73sq m 122 105 138 125 116   CALCIUM mg/dL 8 6 8 7 7 4* 8 4 8 0*   MAGNESIUM mg/dL  --   --   --   --  1 8 Results from last 7 days   Lab Units 02/26/21  1628 02/25/21  0600   AST U/L 11 12   ALT U/L 8* 8*   ALK PHOS U/L 114 96   TOTAL PROTEIN g/dL 8 0 6 8   ALBUMIN g/dL 1 5* 1 3*   TOTAL BILIRUBIN mg/dL 0 39 0 29     Results from last 7 days   Lab Units 02/25/21  1656 02/25/21  1116 02/25/21  0735 02/24/21  2055 02/24/21  1604 02/24/21  1054 02/24/21  0629 02/23/21  2113 02/23/21  1611 02/23/21  1111 02/23/21  0901 02/22/21  2110   POC GLUCOSE mg/dl 121 220* 159* 154* 154* 141* 112 125 167* 134 154* 159*     Results from last 7 days   Lab Units 02/27/21  0612 02/26/21  1628 02/25/21  0600 02/22/21  0606 02/21/21  0455   GLUCOSE RANDOM mg/dL 161* 282* 135 114 124     BETA-HYDROXYBUTYRATE   Date Value Ref Range Status   01/11/2021 6 2 (H) <0 6 mmol/L Final      Results from last 7 days   Lab Units 02/26/21  1628   PROTIME seconds 29 1*   INR  2 77*   PTT seconds 62*     Results from last 7 days   Lab Units 02/25/21  1458   INFLUENZA A PCR  Negative   INFLUENZA B PCR  Negative   RSV PCR  Negative     ED Treatment:   Medication Administration from 02/26/2021 1305 to 02/27/2021 0432       Date/Time Order Dose Route Action     02/26/2021 2326 methadone (DOLOPHINE) tablet 70 mg 70 mg Oral Given     02/26/2021 2314 sodium chloride 0 9 % bolus 1,000 mL 1,000 mL Intravenous New Bag        Past Medical History:   Diagnosis Date    Diabetes mellitus ([facility] )     GERD (gastroesophageal reflux disease)     Hypercholesteremia     Hypertension     Methadone use ([facility] )      Present on Admission:   Hypoxia   Right femoral vein DVT (HCC)   Methadone maintenance therapy patient ([facility] )   Ischemia of right lower extremity with suspected rhabdomyolysis   Left Hydropneumothorax    Admitting Diagnosis: Edema [R60 9]  Shortness of breath [R06 02]  Hydropneumothorax [J94 8]  SOB (shortness of breath) [R06 02]  Age/Sex: 56 y o  male  Admission Orders:  Elevate HOB:  Frequent pulse ox cks q/Supplemental oxygen prn ;   Viscous lidocaine swish & spit prn;  Scd's;  acccucks qid w/SSI ;  PT/OT eval and treat;   Daily wgt;  I/O q shift;  Speech bedside swallow eval before po intake;  [employer] spirometry;      Scheduled Medications:  apixaban, 10 mg, Oral, BID  cholecalciferol, 2,000 Units, Oral, Daily  glycopyrrolate, 1 mg, Oral, TID  insulin glargine, 4 Units, Subcutaneous, HS  lisinopril, 10 mg, Oral, Daily  micafungin (MYCAMINE) 100 mL IVPB, 100 mg, Intravenous, Q24H  multivitamin-minerals, 1 tablet, Oral, Daily  polyethylene glycol, 17 g, Oral, Daily      Continuous IV Infusions:     PRN Meds:  acetaminophen, 650 mg, Oral, Q4H PRN  albuterol, 2 puff, Inhalation, Q4H PRN  bisacodyl, 10 mg, Rectal, Daily PRN  Lidocaine Viscous HCl, 15 mL, Swish & Spit, 4x Daily PRN  menthol-methyl salicylate, , Apply externally, 4x Daily PRN  ondansetron, 4 mg, Intravenous, Q6H PRN  oxyCODONE, 2 5 mg, Oral, Q6H PRN        IP CONSULT TO CASE MANAGEMENT    Network Utilization Review Department  ATTENTION: Please call with any questions or concerns to [phone] and carefully listen to the prompts so that you are directed to the right person  All voicemails are confidential   Indra Cisneros all requests for admission clinical reviews, approved or denied determinations and any other requests to dedicated fax number below belonging to the campus where the patient is receiving treatment   List of dedicated fax numbers for the Facilities:  [address] DENIALS (Administrative/Medical Necessity) [phone]   [address] (Maternity/NICU/Pediatrics) [phone]   [address] [place] [address] [address] [facility] [address] Malia Robles [address] ([address] "Cara[address]) [address] [facility] [phone] [address] [facility] Angeline Roxanne Almeida [address] [place]) [address] [place] [address] [phone]

## 2021-02-27 NOTE — ED NOTES
CT called to see if they can go PICC line, Dr Molly Bernal states it is ok for CT scan to use PICC       Lanette Trevino RN  02/26/21 1952

## 2021-02-27 NOTE — CONSULTS
Pulmonary Consultation   Zackary De Leon 56 y o  male MRN: [id number]  Unit/Bed#: -01 Encounter: [id number]      Reason for consultation: hypoxic respiratory failure    Requesting physician: Dr Konstantin Solorio    Impressions:   1  Acute on chronic hypoxic respiratory failure  2  ILD - Bibasilar honeycombing and cystic bronchiectasis secondary to COVID19 infection  3  Bronchiectasis with acute exacerbation  4  Chest pain/heaviness - now resolved  5  Candida empyema - improving L hydropneumothorax   6  Hx of COVID19 pneumonia  7   R femoral DVT  8   DM type 2  9  Chronic pain on methadone    Recommendations:  1  Will start Xopenex and Atrovent every 6 hrs  2  Start Budesonide an perforomist q12 as well  3  Check sputum culture   4  Aggressive pulmonary toilet  5  Start Mucinex q12  6  Check procalcitonin as well  7  Continue IV micafungin  8  Monitor off other antibiotics for now      History of Present Illness   HPI:  Zackary De Leon is a 56 y o  male who comes in originally for DKA and COVID19 pneumonia requiring intubation  He was transferred to this campus for RLE ischemia and had a R AKA  His stay was also complicated by fungal empyema and was placed on extended micafungin with clinical improvement  He was finally discharged to rehab on 2/25/21  He then started abrupt shortness of breath and hypoxia  He had CTA chest to rule out PE which demonstrated improving L hydropneumothorax  He was admitted for further evaluation of shortness of breath and hypoxia  He states that upon arrival to the hospital is seemed to improve a bit  Then today it seems to have resolved  He denies fever, chills or night sweats  Review of systems:  12 point review of systems was completed and was otherwise negative except as listed in HPI        Historical Information   Past Medical History:   Diagnosis Date    Diabetes mellitus ([facility] [address] )     GERD (gastroesophageal reflux disease)     Hypercholesteremia     Hypertension     Methadone use [facility])      Past Surgical History:   Procedure Laterality Date    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    Procedure: AMPUTATION ABOVE KNEE (AKA);   Surgeon: Leanna Núñez MD;  [facility];  Service: Vascular    AMPUTATION ABOVE KNEE (AKA) Right 1/18/2021    Procedure: AMPUTATION ABOVE KNEE (AKA) FORMALIZATION,  R AKA wound washout, wound closure;  Surgeon: Leanna Núñez MD;  [facility];  Service: Vascular    ARTERIOGRAM Right 1/13/2021    Procedure: ARTERIOGRAM;  Surgeon: Sofia Gemran DO;  [facility];  Service: Vascular    IR CHEST TUBE PLACEMENT  2/10/2021    IR LOWER EXTREMITY ANGIOGRAM  1/14/2021    THROMBECTOMY W/ EMBOLECTOMY Right 1/13/2021    Procedure: EMBOLECTOMY/THROMBECTOMY LOWER EXTREMITY;  Surgeon: Sofia German DO;  [facility];  Service: Vascular     Family History   Problem Relation Age of Onset    Diabetes Mother     Hypertension Father     Leukemia Father     Acute lymphoblastic leukemia Father     Cancer Sister        Social History     Socioeconomic History    Marital status:      Spouse name: None    Number of children: None    Years of education: None    Highest education level: None   Occupational History    None   Social Needs    Financial resource strain: None    Food insecurity     Worry: None     Inability: None    Transportation needs     Medical: None     Non-medical: None   Tobacco Use    Smoking status: Former Smoker    Smokeless tobacco: Never Used   Substance and Sexual Activity    Alcohol use: Not Currently    Drug use: No     Comment: methadone    Sexual activity: None   Lifestyle    Physical activity     Days per week: None     Minutes per session: None    Stress: None   Relationships    Social connections     Talks on phone: None     Gets together: None     Attends [employer] witness service: None     Active member of club or organization: None     Attends meetings of clubs or organizations: None     Relationship status: None    Intimate partner violence     Fear of current or ex partner: None     Emotionally abused: None     Physically abused: None     Forced sexual activity: None   Other Topics Concern    None   Social History Narrative    None         Meds/Allergies   Current Facility-Administered Medications   Medication Dose Route Frequency    acetaminophen (TYLENOL) tablet 650 mg  650 mg Oral Q4H PRN    albuterol (PROVENTIL HFA,VENTOLIN HFA) inhaler 2 puff  2 puff Inhalation Q4H PRN    apixaban (ELIQUIS) tablet 10 mg  10 mg Oral BID    bisacodyl (DULCOLAX) rectal suppository 10 mg  10 mg Rectal Daily PRN    cholecalciferol (VITAMIN D3) tablet 2,000 Units  2,000 Units Oral Daily    glycopyrrolate (ROBINUL) tablet 1 mg  1 mg Oral TID    insulin glargine (LANTUS) subcutaneous injection 4 Units 0 04 mL  4 Units Subcutaneous HS    Lidocaine Viscous HCl (XYLOCAINE) 2 % mucosal solution 15 mL  15 mL Swish & Spit 4x Daily PRN    lisinopril (ZESTRIL) tablet 10 mg  10 mg Oral Daily    menthol-methyl salicylate (BENGAY) 36-44 % cream   Apply externally 4x Daily PRN    methadone (DOLOPHINE) tablet 70 mg  70 mg Oral Daily    micafungin (MYCAMINE) 100 mg in sodium chloride 0 9 % 100 mL IVPB  100 mg Intravenous Q24H    multivitamin-minerals (CENTRUM ADULTS) tablet 1 tablet  1 tablet Oral Daily    ondansetron (ZOFRAN) injection 4 mg  4 mg Intravenous Q6H PRN    oxyCODONE (ROXICODONE) IR tablet 2 5 mg  2 5 mg Oral Q6H PRN    polyethylene glycol (MIRALAX) packet 17 g  17 g Oral Daily     Medications Prior to Admission   Medication    acetaminophen (TYLENOL) 325 mg tablet    albuterol (PROVENTIL HFA,VENTOLIN HFA) 90 mcg/act inhaler    [START ON 3/2/2021] apixaban (ELIQUIS) 5 mg    apixaban (ELIQUIS) 5 mg    bisacodyl (DULCOLAX) 10 mg suppository    Blood Glucose Monitoring Suppl (ONETOUCH VERIO) w/Device KIT    cholecalciferol (VITAMIN D3) 1,000 units tablet    Diclofenac Sodium (VOLTAREN) 1 %    Empagliflozin (Jardiance) 25 MG TABS    furosemide (LASIX) 40 mg tablet    glycopyrrolate (ROBINUL) 1 mg tablet    insulin glargine (LANTUS) 100 units/mL subcutaneous injection    Insulin Pen Needle 31G X 5 MM MISC    Insulin Pen Needle 31G X 5 MM MISC    Lidocaine Viscous HCl (XYLOCAINE) 2 % mucosal solution    lisinopril (ZESTRIL) 10 mg tablet    menthol-methyl salicylate (BENGAY) 81-96 % cream    methadone (DOLOPHINE) 10 mg tablet    micafungin 100 mg in sodium chloride 0 9 % 100 mL IVPB    multivitamin-minerals (CENTRUM ADULTS) tablet    polyethylene glycol (MIRALAX) 17 g packet    potassium chloride (K-DUR,KLOR-CON) 20 mEq tablet    sitaGLIPtin-metFORMIN (JANUMET)  MG per tablet     Allergies   Allergen Reactions    Varenicline        Home medications:  Prior to Admission medications    Medication Sig Start Date End Date Taking?  Authorizing Provider   acetaminophen (TYLENOL) 325 mg tablet Take 3 tablets (975 mg total) by mouth every 8 (eight) hours 2/25/21   Mary Hitchcock MD   albuterol (PROVENTIL HFA,VENTOLIN HFA) 90 mcg/act inhaler Inhale 2 puffs every 4 (four) hours as needed for wheezing 2/25/21   Mary Hitchcock MD   apixaban (ELIQUIS) 5 mg Take 1 tablet (5 mg total) by mouth 2 (two) times a day 3/2/21   Mary Hitchcock MD   apixaban (ELIQUIS) 5 mg Take 2 tablets (10 mg total) by mouth 2 (two) times a day for 7 days 2/22/21 3/1/21  Mary Hitchcock MD   bisacodyl (DULCOLAX) 10 mg suppository Insert 1 suppository (10 mg total) into the rectum daily as needed for constipation 2/25/21   Mary Hitchcock MD   Blood Glucose Monitoring Suppl (Sol Fortune) w/Device KIT Use to test blood sugars 3 times daily 12/5/19   Annika Patel PA-C   cholecalciferol (VITAMIN D3) 1,000 units tablet Take 2 tablets (2,000 Units total) by mouth daily 2/26/21   Mary Hitchcock MD   Diclofenac Sodium (VOLTAREN) 1 % Apply 4 g topically 4 (four) times a day 2/25/21   Ok Ramirez MD   Empagliflozin (Jardiance) 25 MG TABS Take 1 tablet (25 mg total) by mouth every morning 6/9/20   Annika Patel PA-C   furosemide (LASIX) 40 mg tablet Take 1 tablet (40 mg total) by mouth daily  Patient taking differently: Take 40 mg by mouth as needed (on off work days)  5/28/19   VERONICA Corea   glycopyrrolate (ROBINUL) 1 mg tablet Take 1 tablet (1 mg total) by mouth 3 (three) times a day 2/25/21   Ok Ramirez MD   insulin glargine (LANTUS) 100 units/mL subcutaneous injection Inject 4 Units under the skin daily at bedtime 2/25/21   Ok Ramirez MD   Insulin Pen Needle 31G X 5 MM MISC by Does not apply route daily Pt to inject 4 X daily 10/11/18   VERONICA Corea   Insulin Pen Needle 31G X 5 MM MISC 30 units sq 2X daily 1/3/19   VERONICA Corea   Lidocaine Viscous HCl (XYLOCAINE) 2 % mucosal solution Swish and spit 15 mL 4 (four) times a day as needed for mouth pain or discomfort 2/25/21   Ok Ramirez MD   lisinopril (ZESTRIL) 10 mg tablet Take 1 tablet (10 mg total) by mouth daily 2/26/21   Ok Ramirez MD   menthol-methyl salicylate (BENGAY) 24-99 % cream Apply topically 4 (four) times a day as needed (torticollis) 2/25/21   Ok Ramirez MD   methadone (DOLOPHINE) 10 mg tablet Take 7 tablets by mouth daily, 2/26/21   Ok Ramirez MD   micafungin 100 mg in sodium chloride 0 9 % 100 mL IVPB Infuse 100 mg into a venous catheter every 24 hours for 15 days 2/25/21 3/12/21  Ok Ramirez MD   multivitamin-minerals (CENTRUM ADULTS) tablet Take 1 tablet by mouth daily 2/26/21   Ok Ramirez MD   polyethylene glycol (MIRALAX) 17 g packet Take 17 g by mouth daily 2/26/21   Ok Ramirez MD   potassium chloride (K-DUR,KLOR-CON) 20 mEq tablet Take 2 tablets (40 mEq total) by mouth daily 2/25/21   Ok Ramirez MD   sitaGLIPtin-metFORMIN (JANUMET)  MG per tablet Take 1 tablet by mouth 2 (two) times a day with meals 7/9/20 Gene Tiara, CRNP       Vitals: Tmax Temp (24hrs), Av 7 °F (36 5 °C), Min:97 7 °F (36 5 °C), Max:97 7 °F (36 5 °C)    Blood pressure 122/69, pulse 76, temperature 97 7 °F (36 5 °C), resp  rate 16, height 5' 6" (1 676 m), weight 66 kg (145 lb 8 1 oz), SpO2 98 %  , 3L NC, Body mass index is 23 48 kg/m²  Intake/Output Summary (Last 24 hours) at 2021 1628  Last data filed at 2021 1405  Gross per 24 hour   Intake 1460 ml   Output 325 ml   Net 1135 ml       Physical Exam  General: Pleasant, Awake alert and oriented x 3, conversant without conversational dyspnea, NAD, normal affect  HEENT:  PERRL, Sclera noninjected, nonicteric OU, Nares patent, no nasal flaring, no nasal drainage, Mucous membranes, moist, no oral lesions, normal dentition  NECK: Trachea midline, no accessory muscle use, no stridor, no cervical or supraclavicular adenopathy, JVP not elevated  CARDIAC: Reg, single s1/S2, no m/r/g  PULM: Crackles in lung bases  No wheezing or rhonchi  CHEST: No gross deformities, equal chest expansion on inspiration bilaterally  ABD: Normoactive bowel sounds, soft nontender, nondistended, no rebound, no rigidity, no guarding  EXT: No cyanosis, no clubbing, no edema, normal capillary refill  SKIN:  No rashes, no lesions  NEURO: no focal neurologic deficits, AAOx3, moving all extremities appropriately    Labs: I have personally reviewed pertinent lab results    Results from last 7 days   Lab Units 21  0612 21  1628 21  0600   WBC Thousand/uL 10 46* 20 01* 11 03*   HEMOGLOBIN g/dL 8 1* 9 2* 9 3*   HEMATOCRIT % 26 9* 30 3* 31 4*   PLATELETS Thousands/uL 272 393* 388   NEUTROS PCT %  --  95* 82*   MONOS PCT %  --  1* 5      Results from last 7 days   Lab Units 21  0612 21  1628 21  0600   POTASSIUM mmol/L 3 8 4 8 3 0*   CHLORIDE mmol/L 108 102 112*   CO2 mmol/L 26 28 26   BUN mg/dL 15 17 13   CREATININE mg/dL 0 44* 0 64 0 33*   CALCIUM mg/dL 8 6 8 7 7 4*   ALK PHOS U/L  -- 114 96   ALT U/L  --  8* 8*   AST U/L  --  11 12     Results from last 7 days   Lab Units 02/21/21  0455   MAGNESIUM mg/dL 1 8          Results from last 7 days   Lab Units 02/26/21  1628   INR  2 77*   PTT seconds 62*         0   Lab Value Date/Time    TROPONINI <0 02 02/13/2021 2036    TROPONINI <0 03 01/11/2021 1020    TROPONINI <0 03 01/06/2021 0557    TROPONINI <0 03 12/31/2020 1955    TROPONINI <0 02 05/21/2020 1517    TROPONINI <0 02 05/21/2020 1228       Micro:  Lab Results   Component Value Date    BLOODCX No Growth After 5 Days  02/14/2021    BLOODCX No Growth After 5 Days  02/14/2021    BLOODCX No Growth After 5 Days  01/21/2021    URINECX No Growth <1000 cfu/mL 01/12/2021    SPUTUMCULTUR 2+ Growth of  01/23/2021       Imaging and other studies: I have personally reviewed pertinent reports  CT chest - IMPRESSION:   No evidence of pulmonary embolus  Decreasing size of loculated left hydropneumothorax following removal of removal of pigtail drainage catheter  Trace right hydropneumothorax  Comparing to prior exams increasing architectural distortion, bronchiectasis and suspected honeycombing throughout the bilateral lung bases consistent with fibrotic changes following patient's prior Covid pneumonia or alternatively an underlying   interstitial lung disease    Pulmonary function testing: None    EKG, Pathology, and Other Studies: I have personally reviewed pertinent reports  Echo - SUMMARY  LEFT VENTRICLE:  Systolic function was vigorous  Ejection fraction was estimated to be 70 %  There were no regional wall motion abnormalities  RIGHT VENTRICLE:  The size was normal   Systolic function was hyperdynamic      Code Status: Level 1 - Full Code      DO [facility]

## 2021-02-27 NOTE — PROGRESS NOTES
Post admit note    Patient presents with worsening shortness of breath generalized weakness and easy fatigability  Decreased appetite and weight loss    Patient comfortably in bed, features of protein calorie malnutrition noted, neck supple, lungs diminished breath sounds bilateral, heart sounds S1-S2 noted, abdomen soft, awake obeys simple commands, pulses noted, no rash      A/P  · Acute hypoxic respiratory failure - multifactorial, continue supplemental oxygen keep O2 sats more than 90-92%  · History of left-sided candidal empyema on IV micafungin through 03/12/2021 per Infectious Disease note dated 02/25/2021  · Status post right AKA  · History of right lower extremity DVT continue Eliquis  · Diabetes mellitus type 2 continue insulin monitor Accu-Cheks avoid hypoglycemia      Discussed with the patient, updated nephew Madeline Mark

## 2021-02-27 NOTE — PLAN OF CARE
Problem: Potential for Falls  Goal: Patient will remain free of falls  Description: INTERVENTIONS:  - Assess patient frequently for physical needs  -  Identify cognitive and physical deficits and behaviors that affect risk of falls    -  [place] fall precautions as indicated by assessment   - Educate patient/family on patient safety including physical limitations  - Instruct patient to call for assistance with activity based on assessment  - Modify environment to reduce risk of injury  - Consider OT/PT consult to assist with strengthening/mobility  Outcome: Progressing     Problem: Prexisting or High Potential for Compromised Skin Integrity  Goal: Skin integrity is maintained or improved  Description: INTERVENTIONS:  - Identify patients at risk for skin breakdown  - Assess and monitor skin integrity  - Assess and monitor nutrition and hydration status  - Monitor labs   - Assess for incontinence   - Turn and reposition patient  - Assist with mobility/ambulation  - Relieve pressure over bony prominences  - Avoid friction and shearing  - Provide appropriate hygiene as needed including keeping skin clean and dry  - Evaluate need for skin moisturizer/barrier cream  - Collaborate with interdisciplinary team   - Patient/family teaching  - Consider wound care consult   Outcome: Progressing     Problem: PAIN - ADULT  Goal: Verbalizes/displays adequate comfort level or baseline comfort level  Description: Interventions:  - Encourage patient to monitor pain and request assistance  - Assess pain using appropriate pain scale  - Administer analgesics based on type and severity of pain and evaluate response  - Implement non-pharmacological measures as appropriate and evaluate response  - Consider cultural and social influences on pain and pain management  - Notify physician/advanced practitioner if interventions unsuccessful or patient reports new pain  Outcome: Progressing     Problem: INFECTION - ADULT  Goal: Absence or prevention of progression during hospitalization  Description: INTERVENTIONS:  - Assess and monitor for signs and symptoms of infection  - Monitor lab/diagnostic results  - Monitor all insertion sites, i e  indwelling lines, tubes, and drains  - Monitor endotracheal if appropriate and nasal secretions for changes in amount and color  - [place] appropriate cooling/warming therapies per order  - Administer medications as ordered  - Instruct and encourage patient and family to use good hand hygiene technique  - Identify and instruct in appropriate isolation precautions for identified infection/condition  Outcome: Progressing     Problem: SAFETY ADULT  Goal: Patient will remain free of falls  Description: INTERVENTIONS:  - Assess patient frequently for physical needs  -  Identify cognitive and physical deficits and behaviors that affect risk of falls    -  [place] fall precautions as indicated by assessment   - Educate patient/family on patient safety including physical limitations  - Instruct patient to call for assistance with activity based on assessment  - Modify environment to reduce risk of injury  - Consider OT/PT consult to assist with strengthening/mobility  Outcome: Progressing  Goal: Maintain or return to baseline ADL function  Description: INTERVENTIONS:  -  Assess patient's ability to carry out ADLs; assess patient's baseline for ADL function and identify physical deficits which impact ability to perform ADLs (bathing, care of mouth/teeth, toileting, grooming, dressing, etc )  - Assess/evaluate cause of self-care deficits   - Assess range of motion  - Assess patient's mobility; develop plan if impaired  - Assess patient's need for assistive devices and provide as appropriate  - Encourage maximum independence but intervene and supervise when necessary  - Involve family in performance of ADLs  - Assess for home care needs following discharge   - Consider OT consult to assist with ADL evaluation and planning for discharge  - Provide patient education as appropriate  Outcome: Progressing  Goal: Maintain or return mobility status to optimal level  Description: INTERVENTIONS:  - Assess patient's baseline mobility status (ambulation, transfers, stairs, etc )    - Identify cognitive and physical deficits and behaviors that affect mobility  - Identify mobility aids required to assist with transfers and/or ambulation (gait belt, sit-to-stand, lift, walker, cane, etc )  - [place] fall precautions as indicated by assessment  - Record patient progress and toleration of activity level on Mobility SBAR; progress patient to next Phase/Stage  - Instruct patient to call for assistance with activity based on assessment  - Consider rehabilitation consult to assist with strengthening/weightbearing, etc   Outcome: Progressing     Problem: DISCHARGE PLANNING  Goal: Discharge to home or other facility with appropriate resources  Description: INTERVENTIONS:  - Identify barriers to discharge w/patient and caregiver  - Arrange for needed discharge resources and transportation as appropriate  - Identify discharge learning needs (meds, wound care, etc )  - Arrange for interpretive services to assist at discharge as needed  - Refer to Case Management Department for coordinating discharge planning if the patient needs post-hospital services based on physician/advanced practitioner order or complex needs related to functional status, cognitive ability, or social support system  Outcome: Progressing     Problem: Knowledge Deficit  Goal: Patient/family/caregiver demonstrates understanding of disease process, treatment plan, medications, and discharge instructions  Description: Complete learning assessment and assess knowledge base    Interventions:  - Provide teaching at level of understanding  - Provide teaching via preferred learning methods  Outcome: Progressing     Problem: Nutrition/Hydration-ADULT  Goal: Nutrient/Hydration intake appropriate for improving, restoring or maintaining nutritional needs  Description: Monitor and assess patient's nutrition/hydration status for malnutrition  Collaborate with interdisciplinary team and initiate plan and interventions as ordered  Monitor patient's weight and dietary intake as ordered or per policy  Utilize nutrition screening tool and intervene as necessary  Determine patient's food preferences and provide high-protein, high-caloric foods as appropriate       INTERVENTIONS:  - Monitor oral intake, urinary output, labs, and treatment plans  - Assess nutrition and hydration status and recommend course of action  - Evaluate amount of meals eaten  - Assist patient with eating if necessary   - Allow adequate time for meals  - Recommend/ encourage appropriate diets, oral nutritional supplements, and vitamin/mineral supplements  - Order, calculate, and assess calorie counts as needed  - Recommend, monitor, and adjust tube feedings and TPN/PPN based on assessed needs  - Assess need for intravenous fluids  - Provide specific nutrition/hydration education as appropriate  - Include patient/family/caregiver in decisions related to nutrition  Outcome: Progressing     Problem: RESPIRATORY - ADULT  Goal: Achieves optimal ventilation and oxygenation  Description: INTERVENTIONS:  - Assess for changes in respiratory status  - Assess for changes in mentation and behavior  - Position to facilitate oxygenation and minimize respiratory effort  - Oxygen administered by appropriate delivery if ordered  - Initiate smoking cessation education as indicated  - Encourage broncho-pulmonary hygiene including cough, deep breathe, Incentive Spirometry  - Assess the need for suctioning and aspirate as needed  - Assess and instruct to report SOB or any respiratory difficulty  - Respiratory Therapy support as indicated  Outcome: Progressing     Problem: SKIN/TISSUE INTEGRITY - ADULT  Goal: Skin integrity remains intact  Description: INTERVENTIONS  - Identify patients at risk for skin breakdown  - Assess and monitor skin integrity  - Assess and monitor nutrition and hydration status  - Monitor labs (i e  albumin)  - Assess for incontinence   - Turn and reposition patient  - Assist with mobility/ambulation  - Relieve pressure over bony prominences  - Avoid friction and shearing  - Provide appropriate hygiene as needed including keeping skin clean and dry  - Evaluate need for skin moisturizer/barrier cream  - Collaborate with interdisciplinary team (i e  Nutrition, Rehabilitation, etc )   - Patient/family teaching  Outcome: Progressing  Goal: Incision(s), wounds(s) or drain site(s) healing without S/S of infection  Description: INTERVENTIONS  - Assess and document risk factors for skin impairment   - Assess and document dressing, incision, wound bed, drain sites and surrounding tissue  - Consider nutrition services referral as needed  - Oral mucous membranes remain intact  - Provide patient/ family education  Outcome: Progressing     Problem: MUSCULOSKELETAL - ADULT  Goal: Maintain or return mobility to safest level of function  Description: INTERVENTIONS:  - Assess patient's ability to carry out ADLs; assess patient's baseline for ADL function and identify physical deficits which impact ability to perform ADLs (bathing, care of mouth/teeth, toileting, grooming, dressing, etc )  - Assess/evaluate cause of self-care deficits   - Assess range of motion  - Assess patient's mobility  - Assess patient's need for assistive devices and provide as appropriate  - Encourage maximum independence but intervene and supervise when necessary  - Involve family in performance of ADLs  - Assess for home care needs following discharge   - Consider OT consult to assist with ADL evaluation and planning for discharge  - Provide patient education as appropriate  Outcome: Progressing  Goal: Maintain proper alignment of affected body part  Description: INTERVENTIONS:  - Support, maintain and protect limb and body alignment  - Provide patient/ family with appropriate education  Outcome: Progressing

## 2021-02-27 NOTE — ASSESSMENT & PLAN NOTE
· With previous complicated [id number] case requiring intubation, additionally with empyema on micafungin  · Presented from rehab with reported hypoxia with oxygen requirement and increased work of breathing, now improving and on 1 L NC  · CTA PE study negative for pulmonary embolism, and with improving known empyema; persistent changes noted  · Continue supplemental oxygen as needed to maintain SaO2 greater than 88%  · Incentive spirometry, pulmonary toileting

## 2021-02-27 NOTE — CASE MANAGEMENT
LOS 1  Unplanned readmission risk score: N/A  Not a bundle  Spoke with pt to discuss the role of CM and to discuss any help pt may need prior to dc  Pt was [facility] on 2/25 [facility]  Pt states plan is to return when medically stable  A post acute care recommendation was made by your care team for Acute Rehab  Discussed [place] of Choice with patient  Choice is to return/continue services  [facility] referral sent  Prior to admission, pt was living in a 3 story home with 3 FRANDY  Pt resides with his siblings: Dana Chaney and Charlie Holden and nephew's Brandee Flynn and Shahbaz  Bedroom/bathroom on 2nd floor  No half bathroom on first  Independent with ADLs and ambulation pta, no use of DME  Pt works full time and drives  No prior hx of VNA, STR, MH, or alcohol abuse  Pt has hx of drug abuse--23years sober  Pt is on Methadone maintenance through [facility] - pt goes weekly  Pharmacy is [facility] in [place]  PCP: [facility] [facility] [place],        :  Mac Merino - (h) [phone] or (c) [phone] or SisterNilo (c) [phone]  No POA or living will  CM reviewed d/c planning process including the following: identifying help at home, patient preference for d/c planning needs, Discharge Lounge, [facility] Meds to Bed program, availability of treatment team to discuss questions or concerns patient and/or family may have regarding understanding medications and recognizing signs and symptoms once discharged  CM also encouraged patient to follow up with all recommended appointments after discharge  Patient advised of importance for patient and family to participate in managing patients medical well being  Patient/caregiver received discharge checklist  Content reviewed  Patient/caregiver encouraged to participate in discharge plan of care prior to discharge home

## 2021-02-27 NOTE — H&P
H&P- Pauly  1960, 56 y o  male MRN: [id number]    Unit/Bed#: -01 Encounter: [id number]    Primary Care Provider: VERONICA Good   Date and time admitted to hospital: 2/26/2021  3:41 PM        * Hypoxia  Assessment & Plan  · With previous complicated [id number] case requiring intubation, additionally with empyema on micafungin  · Presented from rehab with reported hypoxia with oxygen requirement and increased work of breathing, now improving and on 1 L NC  · CTA PE study negative for pulmonary embolism, and with improving known empyema; persistent changes noted  · Continue supplemental oxygen as needed to maintain SaO2 greater than 88%  · Incentive spirometry, pulmonary toileting    Left Hydropneumothorax  Assessment & Plan  · Improving as per CTA PE study  · S/p prior pigtail, has been removed  · Continue micafungin through 03/12/2021    Right femoral vein DVT ([facility] )  Assessment & Plan  · Continue Eliquis    Ischemia of right lower extremity with suspected rhabdomyolysis  Assessment & Plan  · 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  · Continue Eliquis, wound care    Type 2 diabetes mellitus, with long-term current use of insulin [facility])  Assessment & Plan  Lab Results   Component Value Date    HGBA1C 11 6 (H) 12/09/2020       Recent Labs     02/24/21  2055 02/25/21  0735 02/25/21  1116 02/25/21  1656   POCGLU 154* 159* 220* 121       Blood Sugar Average: Last 72 hrs:  ·  uncontrolled in outpatient setting  · Continue home insulin regimen, hold oral hypoglycemics  · Add SSI with Accu-Cheks, carb controlled diet  · Initiate hypoglycemia protocol    Methadone maintenance therapy patient ([facility] )  Assessment & Plan  · On chronic methadone 70 mg daily  · Will verify dosing in a m  VTE Prophylaxis: Apixaban (Eliquis)  / sequential compression device   Code Status: Level 1 - Full Code  POLST: POLST form is not discussed and not completed at this time      Anticipated Length of Stay:  Patient will be admitted on an Observation basis with an anticipated length of stay of  less than 2 midnights  Justification for Hospital Stay: Please see detailed plans noted above  Chief Complaint:     Shortness of breath and hypoxia  History of Present Illness:  David Covington is a 56 y o  male with recent admission initially at [facility] with DKA and COVID-19 infection requiring intubation, transferred to this [place] urgently with right lower extremity ischemia for which right AKA was completed; patient additionally noted with left lung empyema from fungal infection initially started on fluconazole transition to micafungin due to prolonged QTC, and following improvement discharged 02/25/2021 to rehab  Presented from there with increased shortness of breath and hypoxia requiring 2L NC CTA PE study that after facility with concern for empyema  Patient noted improvement in shortness of breath on ED arrival; CTA PE study revealed improving left hydropneumothorax and was without evidence of pulmonary embolism  Still requiring some supplemental oxygen, and subsequently is admitted for further evaluation of shortness of breath and hypoxia  At the time my evaluation, patient is lying in bed in no acute distress, and notes no shortness of breath at this time  Offers no other acute complaints and denies fevers/chills, chest pain/pressure, cough/wheezing, dizziness/lightheadedness, or abdominal pain/nausea/vomiting/diarrhea      Review of Systems:    Constitutional:  Denies fever or chills   Eyes:  Denies change in visual acuity   HENT:  Denies nasal congestion or sore throat   Respiratory:  Denies cough but endorsed shortness of breath (improving)  Cardiovascular:  Denies chest pain or edema   GI:  Denies abdominal pain, nausea, vomiting, bloody stools or diarrhea   :  Denies dysuria   Musculoskeletal:  Denies back pain or joint pain   Integument:  Denies rash but endorsed wound  Neurologic:  Denies headache, focal weakness or sensory changes   Endocrine:  Denies polyuria or polydipsia   Lymphatic:  Denies swollen glands   Psychiatric:  Denies depression or anxiety     Past Medical and Surgical History:   Past Medical History:   Diagnosis Date    Diabetes mellitus ([facility] )     GERD (gastroesophageal reflux disease)     Hypercholesteremia     Hypertension     Methadone use ([facility] )      Past Surgical History:   Procedure Laterality Date    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    Procedure: AMPUTATION ABOVE KNEE (AKA);   Surgeon: Leanna Núñez MD;  [facility]: [facility];  Service: Vascular    AMPUTATION ABOVE KNEE (AKA) Right 1/18/2021    Procedure: AMPUTATION ABOVE KNEE (AKA) FORMALIZATION,  R AKA wound washout, wound closure;  Surgeon: Leanna Núñez MD;  [facility];  Service: Vascular    ARTERIOGRAM Right 1/13/2021    Procedure: ARTERIOGRAM;  Surgeon: Sofia German DO;  [facility];  Service: Vascular    IR CHEST TUBE PLACEMENT  2/10/2021    IR LOWER EXTREMITY ANGIOGRAM  1/14/2021    THROMBECTOMY W/ EMBOLECTOMY Right 1/13/2021    Procedure: EMBOLECTOMY/THROMBECTOMY LOWER EXTREMITY;  Surgeon: Sofia German DO;  [facility];  Service: Vascular       Meds/Allergies:  Medications Prior to Admission   Medication    acetaminophen (TYLENOL) 325 mg tablet    albuterol (PROVENTIL HFA,VENTOLIN HFA) 90 mcg/act inhaler    [START ON 3/2/2021] apixaban (ELIQUIS) 5 mg    apixaban (ELIQUIS) 5 mg    bisacodyl (DULCOLAX) 10 mg suppository    Blood Glucose Monitoring Suppl (ONETOUCH VERIO) w/Device KIT    cholecalciferol (VITAMIN D3) 1,000 units tablet    Diclofenac Sodium (VOLTAREN) 1 %    Empagliflozin (Jardiance) 25 MG TABS    furosemide (LASIX) 40 mg tablet    glycopyrrolate (ROBINUL) 1 mg tablet    insulin glargine (LANTUS) 100 units/mL subcutaneous injection    Insulin Pen Needle 31G X 5 MM MISC    Insulin Pen Needle 31G X 5 MM MISC    Lidocaine Viscous HCl (XYLOCAINE) 2 % mucosal solution    lisinopril (ZESTRIL) 10 mg tablet    menthol-methyl salicylate (BENGAY) 93-50 % cream    methadone (DOLOPHINE) 10 mg tablet    micafungin 100 mg in sodium chloride 0 9 % 100 mL IVPB    multivitamin-minerals (CENTRUM ADULTS) tablet    polyethylene glycol (MIRALAX) 17 g packet    potassium chloride (K-DUR,KLOR-CON) 20 mEq tablet    sitaGLIPtin-metFORMIN (JANUMET)  MG per tablet       Allergies: Allergies   Allergen Reactions    Varenicline      History:  Marital Status:      Substance Use History:   Social History     Substance and Sexual Activity   Alcohol Use Not Currently     Social History     Tobacco Use   Smoking Status Former Smoker   Smokeless Tobacco Never Used     Social History     Substance and Sexual Activity   Drug Use No    Comment: methadone       Family History:  Family History   Problem Relation Age of Onset    Diabetes Mother     Hypertension Father     Leukemia Father     Acute lymphoblastic leukemia Father     Cancer Sister        Physical Exam:     Vitals:   Blood Pressure: 164/81 (02/27/21 0200)  Pulse: 70 (02/27/21 0200)  Temperature: 98 1 °F (36 7 °C) (02/26/21 1603)  Temp Source: Oral (02/26/21 1603)  Respirations: 16 (02/27/21 0200)  Height: 5' 6" (167 6 cm) (02/26/21 1647)  Weight - Scale: 66 kg (145 lb 8 1 oz) (02/26/21 1647)  SpO2: 100 % (02/27/21 0200)    Constitutional:  Well developed, well nourished, no acute distress, non-toxic appearance   Eyes:  PERRL, conjunctiva normal   HENT:  Atraumatic, external ears normal, nose normal, oropharynx moist, no pharyngeal exudates   Neck- normal range of motion, no tenderness, supple   Respiratory:  No respiratory distress, decreased breath sounds in left lung, no rales, no wheezing   Cardiovascular:  Normal rate, normal rhythm, no murmurs, no gallops, no rubs   GI:  Soft, nondistended, normal bowel sounds, nontender, no organomegaly, no mass, no rebound, no guarding   :  No costovertebral angle tenderness Musculoskeletal:  No edema, no tenderness, s/p right AKA, wound C/D/I  Back- no tenderness  Integument:  Well hydrated, no rash, stage II sacral ulcer   Lymphatic:  No lymphadenopathy noted   Neurologic:  Alert &awake, communicative, CN 2-12 normal, normal motor function, normal sensory function, no focal deficits noted   Psychiatric:  Speech and behavior appropriate       Lab Results: I have personally reviewed pertinent reports  Results from last 7 days   Lab Units 02/26/21  1628   WBC Thousand/uL 20 01*   HEMOGLOBIN g/dL 9 2*   HEMATOCRIT % 30 3*   PLATELETS Thousands/uL 393*   NEUTROS PCT % 95*   LYMPHS PCT % 3*   MONOS PCT % 1*   EOS PCT % 0     Results from last 7 days   Lab Units 02/26/21  1628   POTASSIUM mmol/L 4 8   CHLORIDE mmol/L 102   CO2 mmol/L 28   BUN mg/dL 17   CREATININE mg/dL 0 64   CALCIUM mg/dL 8 7   ALK PHOS U/L 114   ALT U/L 8*   AST U/L 11     Results from last 7 days   Lab Units 02/26/21  1628   INR  2 77*        EKG:  Sinus rhythm with short p r  Imaging: I have personally reviewed pertinent reports  Cta Head And Neck W Wo Contrast    Result Date: 2/13/2021  Narrative: CTA NECK AND BRAIN AND WITHOUT CONTRAST INDICATION: Neuro deficit, acute, stroke suspected, change in neuro exam, not moving RUE  Stroke alert, flaccid right upper extremity  Patient had confirmed COVID-19, tested positive December 31, 2020  COMPARISON:   CT of the chest dated February 13, 2021 at 2:57 AM  TECHNIQUE:  Post contrast imaging was performed after administration of iodinated contrast through the neck and brain  Post contrast axial 0 625 mm images timed to opacify the arterial system  3D rendering was performed on an independent workstation  MIP reconstructions performed  Radiation dose length product (DLP) for this visit:  393 mGy-cm     This examination, like all CT scans performed in the [facility], was performed utilizing techniques to minimize radiation dose exposure, including the use of iterative reconstruction and automated exposure control  IV Contrast:  85 mL of iohexol (OMNIPAQUE)  IMAGE QUALITY:   Diagnostic FINDINGS: CERVICAL VASCULATURE AORTIC ARCH AND GREAT VESSELS:  Mild atherosclerotic disease of the arch, proximal great vessels and visualized subclavian vessels  No significant stenosis  RIGHT VERTEBRAL ARTERY CERVICAL SEGMENT:  Normal origin  The vessel is normal in caliber throughout the neck  LEFT VERTEBRAL ARTERY CERVICAL SEGMENT:  Normal origin  The vessel is normal in caliber throughout the neck  RIGHT EXTRACRANIAL CAROTID SEGMENT:  Normal caliber common carotid artery  Normal bifurcation and cervical internal carotid artery  No stenosis or dissection  LEFT EXTRACRANIAL CAROTID SEGMENT:  Normal caliber common carotid artery  Normal bifurcation and cervical internal carotid artery  No stenosis or dissection  NASCET criteria was used to determine the degree of internal carotid artery diameter stenosis  INTRACRANIAL VASCULATURE INTERNAL CAROTID ARTERIES:  Normal enhancement of the intracranial portions of the internal carotid arteries  Normal ophthalmic artery origins  Normal ICA terminus  ANTERIOR CIRCULATION:  Symmetric A1 segments and anterior cerebral arteries with normal enhancement  Normal anterior communicating artery  MIDDLE CEREBRAL ARTERY CIRCULATION:  M1 segment and middle cerebral artery branches demonstrate normal enhancement bilaterally  DISTAL VERTEBRAL ARTERIES:  Normal distal vertebral arteries  Posterior inferior cerebellar artery origins are normal  Normal vertebral basilar junction  BASILAR ARTERY:  Basilar artery is normal in caliber  Normal superior cerebellar arteries  POSTERIOR CEREBRAL ARTERIES: Both posterior cerebral arteries arises from the basilar tip  Both arteries demonstrate normal enhancement  The posterior communicating arteries are not visualized   DURAL VENOUS SINUSES:  Portions of the right transverse sinus are not well visualized, however, the left transverse sinus is prominent and appears dominant  If there is clinical concern for acute pathology involving the right transverse sinus, MRV could  be performed for further evaluation, if there are no contraindications  NON VASCULAR ANATOMY BONY STRUCTURES:  No acute osseous abnormality  SOFT TISSUES OF THE NECK:  There is subcutaneous emphysema in the left chest wall and tracking into the soft tissues of the posterior left neck; this is new compared with a CT of the chest performed February 13, 2021 at 2:57 AM  THORACIC INLET:  Left hydropneumothorax and biapical pulmonary parenchymal abnormalities redemonstrated  Impression: No large vessel flow restrictive disease within the head or neck  No evidence of high-grade proximal stenosis of the visualized [place]  No evidence of acute arterial dissection  Portions of the right transverse sinus are not well visualized, however, the left-sided transverse sinus is prominent and appears dominant  If there is clinical concern for acute pathology involving the right transverse sinus, MRV could be performed for  further evaluation, if there are no contraindications  There is subcutaneous emphysema in the left chest wall and tracking into the soft tissues of the posterior left neck  This is new compared with a CT of the chest performed February 13, 2021 at 2:57 AM   I personally discussed this study with Juanito CASTRO on 2/13/2021 at 11:25 PM  Workstation performed: [id number]     Xr Chest Portable    Result Date: 2/17/2021  Narrative: CHEST INDICATION:   Reassessment of left pneumothorax  COMPARISON:  2/15/2021 EXAM PERFORMED/VIEWS:  XR CHEST PORTABLE FINDINGS:  Left pleural catheter is again seen  Persistent hydropneumothorax identified  The degree of pleural air has mildly increased, the degree of pleural fluid has moderately increased  Bilateral patchy pulmonary airspace disease unchanged   Osseous structures appear within normal limits for patient age  Impression: Persistent left pneumothorax, mildly increased in size  Left pleural fluid, moderately increased in amount  Workstation performed: [id number]     Xr Chest Portable    Result Date: 2/15/2021  Narrative: CHEST INDICATION:   pleural effusion  Patient had confirmed COVID-19  Positive on 12/31/2020 and negative on 2/10/2021  COMPARISON:  Chest radiograph from 2/14/2021  Chest CT from 2/13/2021  EXAM PERFORMED/VIEWS:  XR CHEST PORTABLE FINDINGS: Cardiomediastinal silhouette appears unremarkable  Left pigtail catheter with no change in moderate left pneumothorax and subcutaneous emphysema in the left chest wall  The left pleural effusion on CT is not visible  Persistent extensive bilateral consolidation and groundglass opacity  Osseous structures appear within normal limits for patient age  Impression: No change in moderate left pneumothorax  Left pleural effusion on recent CT not visible Persistent extensive bilateral consolidation and groundglass opacity related to Covid 19 pneumonia/post infectious scar  Workstation performed: [id number]     Xr Chest Portable    Result Date: 2/15/2021  Narrative: CHEST INDICATION:   Desaturation  COMPARISON:  2/13/2021 EXAM PERFORMED/VIEWS:  XR CHEST PORTABLE FINDINGS:  Left pleural catheter is again seen projecting more laterally over the left lower thorax  Cardiomediastinal silhouette appears unremarkable  Persistent left-sided pneumothorax, mildly diminished in size  Underlying bilateral pulmonary airspace disease noted  Left-sided chest wall subcutaneous emphysema present Osseous structures appear within normal limits for patient age  Impression: Persistent but mildly diminished in size left-sided pneumothorax; no other significant change Workstation performed: [id number]     Xr Chest Portable    Result Date: 2/13/2021  Narrative: CHEST INDICATION:   hypoxia  COMPARISON:  2/12/2021   EXAM PERFORMED/VIEWS:  XR CHEST PORTABLE FINDINGS: Cardiomediastinal silhouette appears unremarkable  No significant change in the size of moderate left pneumothorax  Left basilar pleural pigtail catheter is again seen  Extensive bilateral groundglass opacity, similar to prior  Osseous structures appear within normal limits for patient age  Subcutaneous emphysema in the left lateral chest wall, new since prior  Impression: No significant change in moderate left pneumothorax  Left basilar pleural pigtail catheter is present  There is now subcutaneous emphysema in the left lateral chest wall  Clinical correlation and follow-up is recommended  Stable extensive bilateral groundglass opacity  I personally discussed this study with Jose CASTRO on 2/13/2021 at 9:49 PM   Workstation performed: [id number]     Xr Chest Portable    Result Date: 2/12/2021  Narrative: CHEST INDICATION:   follow up/hydropneumothorax  Patient had confirmed COVID-19  Positive on 12/31/2020  COMPARISON:  Chest radiograph from yesterday  Chest CT from 1/12/2021  EXAM PERFORMED/VIEWS:  XR CHEST PORTABLE FINDINGS: Cardiomediastinal silhouette appears unremarkable  Slight increase in moderate left pneumothorax  Pigtail catheter in the inferior left pleural space  Redemonstration of moderate bilateral groundglass opacity  Osseous structures appear within normal limits for patient age  Impression: Slight increase in moderate left pneumothorax  Workstation performed: [id number]     Xr Chest Portable    Result Date: 2/11/2021  Narrative: CHEST INDICATION:   left chest tube flushed, follow up pneumothorax  Patient had confirmed COVID-19  Positive on 12/31/2020  Negative on 2/10/2021  COMPARISON:  Chest radiograph from 2/11/2021 and chest CT from 1/12/2021  EXAM PERFORMED/VIEWS:  XR CHEST PORTABLE FINDINGS: Normal heart size  Tubular lucency projecting over the right heart and medial right upper quadrant, question pneumomediastinum  Pigtail catheter over left base  No change in moderate left pneumothorax  Extensive bilateral groundglass opacity Osseous structures appear within normal limits for patient age  Impression: No change in moderate left pneumothorax  Question development of pneumomediastinum  The study was marked in EPIC for significant notification  Workstation performed: [id number]     Xr Chest Portable    Result Date: 2/11/2021  Narrative: CHEST INDICATION:   FU, pneumothorax  Patient had confirmed COVID-19  Positive on 12/31/2020  Negative on 2/10/2021  COMPARISON:  Chest radiograph from 2/9/2021 and chest CT from 1/12/2021  EXAM PERFORMED/VIEWS:  XR CHEST PORTABLE FINDINGS: Cardiomediastinal silhouette appears unremarkable  Left pigtail insertion with drainage of left effusion  Increase in left pneumothorax, now moderate  Persistent extensive bilateral groundglass opacity  Osseous structures appear within normal limits for patient age  Impression: Left pigtail insertion with drainage of the left effusion  Increase in size of left pneumothorax, now moderate  Persistent extensive bilateral groundglass opacity as a result of Covid-19 pneumonia,  Likely post infectious scar  Workstation performed: [id number]     Xr Chest Portable    Result Date: 2/11/2021  Narrative: CHEST INDICATION:   FU hydropneumothorax  Patient had confirmed COVID-19  Positive on 12/31/2020  Negative on 2/10/2021  COMPARISON:  Chest radiograph from 2/10/2021 and 2/9/2021  Chest CT from 1/12/2021  EXAM PERFORMED/VIEWS:  XR CHEST PORTABLE FINDINGS: Cardiomediastinal silhouette appears unremarkable  Persistent left pigtail catheter with minimal decrease in moderate left pneumothorax  Persistent extensive bilateral groundglass opacity  Osseous structures appear within normal limits for patient age  Impression: Left pigtail catheter with minimal decrease in moderate left pneumothorax   Persistent extensive bilateral groundglass opacity, likely post infectious scar from Covid 19 pneumonia  Workstation performed: [id number]     Xr Chest Portable    Result Date: 2/10/2021  Narrative: CHEST INDICATION:   fever with cough  Patient has confirmed COVID-19  Positive on 12/31/2020  COMPARISON:  Chest radiograph from 1/31/2021 and chest CT from 1/12/2021  EXAM PERFORMED/VIEWS:  XR CHEST PORTABLE FINDINGS: Cardiomediastinal silhouette appears unremarkable  Moderate new left hydropneumothorax  Persistent extensive bilateral consolidation and groundglass opacity  Osseous structures appear within normal limits for patient age  Impression: Moderate new left hydropneumothorax  Persistent extensive bilateral groundglass opacity, likely related to Covid 19  I personally discussed this study with Cortney Fatima on 2/10/2021 at 9:17 AM  Workstation performed: [id number]     Xr Chest Portable    Result Date: 2/1/2021  Narrative: CHEST INDICATION:   NGT placement  Patient has confirmed COVID-19  1/27/2021  COMPARISON:  Chest radiograph 1/22/2021 EXAM PERFORMED/VIEWS:  XR CHEST PORTABLE FINDINGS:  There is tubing overlying the chest likely external to the patient  Cardiomediastinal silhouette appears unremarkable  There are bilateral airspace opacities similar to prior exam   No large effusion or pneumothorax  Osseous structures appear within normal limits for patient age  Impression: 1  Persistent bilateral airspace opacities suspicious for multifocal pneumonia  2   Enteric tube is not visualized (in this patient with history of check NG tube placement)  The study was marked in EPIC for significant notification  Workstation performed: [id number]     Xr Chest Pa & Lateral    Result Date: 2/18/2021  Narrative: CHEST INDICATION:   Evaluate after removal of IR pigtail  COMPARISON:  February 17, 2021 EXAM PERFORMED/VIEWS:  XR CHEST PA & LATERAL FINDINGS: There has been removal of pigtail left chest drainage catheter    A left-sided hydropneumothorax is unchanged in size from previous examination  Again noted are interstitial/fibrotic markings throughout the lungs with unchanged appearance from examination of earlier in the day  Heart shadow is unchanged  No acute osseous abnormality identified  Impression: Left hydropneumothorax, unchanged after removal of pigtail left chest drainage catheter  Workstation performed: [id number]     Xr Humerus Right    Result Date: 2/18/2021  Narrative: RIGHT HUMERUS INDICATION:   RUE discomfort  COMPARISON:  None VIEWS:  XR HUMERUS RIGHT FINDINGS: There is no acute fracture or dislocation  No significant degenerative changes  No lytic or blastic osseous lesion  Soft tissues are unremarkable  Impression: No acute osseous abnormality  Workstation performed: [id number]     Xr Abdomen 1 View Kub    Result Date: 2/1/2021  Narrative: ABDOMEN INDICATION:   check NGT placement  COMPARISON:  Chest radiograph 1/22/2021 VIEWS:  AP supine FINDINGS: Evaluation is limited as the lateral right abdomen and lower abdomen are not completely included  There is a nonobstructive bowel gas pattern  No discernible free air on this supine study  Upright or left lateral decubitus imaging is more sensitive to detect subtle free air in the appropriate setting  No pathologic calcifications or soft tissue masses  There are patchy bilateral airspace opacities throughout the lungs  Visualized osseous structures are unremarkable for the patient's age  Impression: 1  Nonobstructive bowel gas pattern  NG tube is not visualized (given history of check NG tube placement)  2   Patchy bilateral opacities are again noted at the bilateral lung bases  Workstation performed: [id number]     Ct Head Wo Contrast    Result Date: 2/8/2021  Narrative: CT BRAIN - WITHOUT CONTRAST INDICATION:   Altered mental status weakness in RUE  COMPARISON:  January 11, 2021 TECHNIQUE:  CT examination of the brain was performed    In addition to axial images, sagittal and coronal 2D reformatted images were created and submitted for interpretation  Radiation dose length product (DLP) for this visit:  853 41 mGy-cm   This examination, like all CT scans performed in the [facility], was performed utilizing techniques to minimize radiation dose exposure, including the use of iterative  reconstruction and automated exposure control  IMAGE QUALITY:  Diagnostic  FINDINGS: PARENCHYMA:  No intracranial mass, mass effect or midline shift  No CT signs of acute infarction  No acute parenchymal hemorrhage  Stable encephalomalacia in the right posterior frontal lobe  VENTRICLES AND EXTRA-AXIAL SPACES:  Normal for the patient's age  VISUALIZED ORBITS AND PARANASAL SINUSES:  Unremarkable  CALVARIUM AND EXTRACRANIAL SOFT TISSUES:  Normal      Impression: No acute intracranial abnormality  Stable chronic encephalomalacia on the right  Workstation performed: [id number]     Ct Chest Wo Contrast    Result Date: 2/13/2021  Narrative: CT CHEST WITHOUT IV CONTRAST INDICATION:   Characterize L pneumothorax/trapped lung  Patient had confirmed COVID-19, tested positive December 31, 2020  COMPARISON:  Plain film portable chest radiograph dated February 12, 2021 and CT of the chest dated January 12, 2021  TECHNIQUE: CT examination of the chest was performed without intravenous contrast   Axial, sagittal, and coronal 2D reformatted images were created from the source data and submitted for interpretation  Radiation dose length product (DLP) for this visit:  864 38 mGy-cm   This examination, like all CT scans performed in the [facility], was performed utilizing techniques to minimize radiation dose exposure, including the use of iterative  reconstruction and automated exposure control  FINDINGS: LUNGS:  Extensive areas of dense consolidation are present bilaterally within the lungs, most pronounced in the lower to mid lungs  This is significantly worse compared to January 12, 2021    There is also a component of volume loss related to atelectasis  Some groundglass opacities are again noted, however, these are also more dense compared to the prior CT  There are scattered air bronchograms, most pronounced in the lower lungs  There is some septal thickening  PLEURA:  A small bore pigtail catheter is in place in the posterolateral lower left hemithorax pleural space  The metallic marker is located just lateral to the rib margin (series 2 image 37, series 601 image 107, series 602 image 33)  The size of the left hydropneumothorax appears similar to the February 12, 2021 plain film, however, is new compared to the January 12, 2021 CT  The left pleural fluid component appears at least partially loculated and is small to moderate in size, measuring up to approximately 3 3 cm AP thickness posteromedially overlying the left lung base and approximately 2 2 cm laterally at the level of the aortic arch  There is some fluid seen tracking into the fissure  There is only minimal trace pleural fluid on the right  The left pneumothorax component measures 2 5 cm transverse dimension laterally at the level of the aortic arch, 3 2 cm transverse dimension laterally at the level of the heart, and 1 8 cm transverse dimension posteromedially at the left lung base  The majority of the pneumothorax is located lateral to the left lung; this is best demonstrated on the coronal images  HEART/GREAT VESSELS:  The blood in the cardiac chambers appears somewhat hypodense relative to the ventricular walls; this suggests anemia  Correlation with hemoglobin/hematocrit recommended  MEDIASTINUM AND IBETH:  Subcarinal fullness again noted  CHEST WALL AND LOWER NECK:   Unremarkable  VISUALIZED STRUCTURES IN THE UPPER ABDOMEN:  Cholelithiasis  OSSEOUS STRUCTURES:  No acute fracture or destructive osseous lesion  Impression: There is a moderate-sized left hydropneumothorax which appears at least partially loculated    This appears similar in size to the February 12, 2021 plain film examination  A smallbore pigtail chest tube catheter is in place in the posterolateral lower left hemithorax pleural space; the metallic marker is located just lateral to the rib margin  Extensive areas of dense consolidation are present bilaterally within the lungs  This appears significantly worse compared with January 12, 2021  The blood in the cardiac chambers appears somewhat hypodense relative to the ventricular walls  This suggests anemia  Correlation with hemoglobin/hematocrit levels is recommended  Cholelithiasis  The images are available for clinical review  Workstation performed: [id number]     Fl Barium Swallow    Result Date: 2/18/2021  Narrative: BARIUM SWALLOW-ESOPHAGRAM INDICATION:   candida empyema  COMPARISON:  CT chest 2/13/2021 Tj IMAGES:  13 FLUOROSCOPY TIME:   1 02 minutes  TECHNIQUE: Limited examination was performed due to patient's medical condition  Omnipaque 350 was given via mouth and multiple images of the esophagus were obtained while patient was in recumbent position  FINDINGS: The esophagus is normal in caliber  Esophageal motility is normal and emptying of contrast from the esophagus is prompt  There is no leak  No gross mucosal abnormality within study limitations  Impression: No leak  Workstation performed: [id number]     Fl Barium Swallow Video W Speech    Result Date: 2/16/2021  Narrative: A video barium swallow study was performed by the Department of Speech Pathology  Please refer to the report for the official interpretation  The images are stored for archival purposes only  Study images were not formally reviewed by the Radiology Department  Fl Barium Swallow Video W Speech    Result Date: 2/2/2021  Narrative: A video barium swallow study was performed by the Department of Speech Pathology  Please refer to the report for the official interpretation  The images are stored for archival purposes only   Study images were not formally reviewed by the Radiology Department  Ct Stroke Alert Brain    Result Date: 2/13/2021  Narrative: CT BRAIN - STROKE ALERT PROTOCOL INDICATION:   Stroke alert, change in neuro exam, not moving RUE  Flaccid right upper extremity  Patient is anticoagulated  Patient had confirmed COVID-19, tested positive December 31, 2020  COMPARISON:  February 8, 2021  TECHNIQUE:  CT examination of the brain was performed  In addition to axial images, coronal reformatted images were created and submitted for interpretation  Radiation dose length product (DLP) for this visit:   This examination, like all CT scans performed in the [facility], was performed utilizing techniques to minimize radiation dose exposure, including the use of iterative reconstruction  and automated exposure control  IMAGE QUALITY:  Diagnostic  FINDINGS:  PARENCHYMA:  No intracranial mass, mass effect or midline shift  No acute parenchymal hemorrhage  Stable encephalomalacia in the posterior right frontal lobe  Questionable subtle hypodensity superior aspect left cerebral peduncle  MRI with diffusion-weighted imaging is recommended for further evaluation, if there are no contraindications  VENTRICLES AND EXTRA-AXIAL SPACES:  Normal for patient's age  VISUALIZED ORBITS AND PARANASAL SINUSES:  Unremarkable  CALVARIUM AND EXTRACRANIAL SOFT TISSUES:   Normal      Impression: No acute intracranial hemorrhage  Questionable subtle hypodensity superior aspect left cerebral peduncle  MRI with diffusion-weighted imaging is recommended for further evaluation, if there are no contraindications  Findings were directly discussed with Ladi CASTRO on 2/13/2021 10:40 PM  Workstation performed: [id number]     Cta Ed Chest Pe Study    Result Date: 2/26/2021  Narrative: CTA - CHEST WITH IV CONTRAST - PULMONARY ANGIOGRAM INDICATION:   sob  COMPARISON: None   TECHNIQUE: CTA examination of the chest was performed using angiographic technique according to a protocol specifically tailored to evaluate for pulmonary embolism  Axial, sagittal, and coronal 2D reformatted images were created from the source data and  submitted for interpretation  In addition, coronal 3D MIP postprocessing was performed on the acquisition scanner  Radiation dose length product (DLP) for this visit:  437 39 mGy-cm   This examination, like all CT scans performed in the [facility], was performed utilizing techniques to minimize radiation dose exposure, including the use of iterative  reconstruction and automated exposure control  IV Contrast:  85 mL of iohexol (OMNIPAQUE)  FINDINGS: PULMONARY ARTERIAL TREE:  No pulmonary embolus is seen  LUNGS:  Comparing to prior exams increasing architectural distortion, traction bronchiectasis, and reticular markings  At the bilateral lung bases suspected honeycombing versus confluent cylindrical bronchiectasis  There is no tracheal or endobronchial  lesion  PLEURA:  Decreasing left hydropneumothorax  Trace right pleural effusion with air component (series 3, image 52)  Kendell Mota HEART/GREAT VESSELS:  Unremarkable for patient's age  MEDIASTINUM AND IBETH:  Mild mediastinal adenopathy, for instance a 1 1 cm subcarinal lymph node    CHEST WALL AND LOWER NECK:   Unremarkable  VISUALIZED STRUCTURES IN THE UPPER ABDOMEN:  Unremarkable  OSSEOUS STRUCTURES:  No acute fracture or destructive osseous lesion  Impression: No evidence of pulmonary embolus Decreasing size of loculated left hydropneumothorax following removal of removal of pigtail drainage catheter  Trace right hydropneumothorax   Comparing to prior exams increasing architectural distortion, bronchiectasis and suspected honeycombing throughout the bilateral lung bases consistent with fibrotic changes following patient's prior Covid pneumonia or alternatively an underlying interstitial lung disease Findings discussed with Dr Johnathan Ricardo at 10:25 PM, 2/26/2021 Workstation performed: [id number]     Vas Lower Limb Venous Duplex Study, Unilateral/limited    Result Date: 2/26/2021  Narrative:  [facility] REPORT CLINICAL: Indications: Patient presents with acute onset SOB with recent AKA  Patient is currently taking Eliquis  Operative History: 2021-01-18 Right AKA wound washout 2021-01-14 Right AKA 2021-01-14 Right IR LE angiogram 2021-01-13 Right Thrombectomy w/ embolectomy Risk Factors The patient has history of HTN, Diabetes (IDDM), HLD, DVT, Hep C, Aortic thrombus, and High cholesterol  FINDINGS:  Segment       Right            Left                        Impression       Impression       CFV           Normal (Patent)  Normal (Patent)  GSV Inguinal  Normal (Patent)  Normal (Patent)     CONCLUSION: RIGHT LOWER LIMB No evidence of acute or chronic deep vein thrombosis noted in the CFV, DFV, and FV  No evidence of superficial thrombophlebitis noted  Doppler evaluation shows a normal response to augmentation maneuvers  LEFT LOWER LIMB LIMITED No evidence of acute or chronic deep vein thrombosis   No evidence of superficial thrombophlebitis noted  Doppler evaluation shows a normal response to augmentation maneuvers  Popliteal, posterior tibial and anterior tibial arterial Doppler waveforms are triphasic  Technical findings were Tiger texted to Dr Francheska Zeng @ 17:33 and faxed to chart  Ir Chest Tube Placement    Result Date: 2/11/2021  Narrative: Ultrasound guided left chest tube placement Clinical History: 58-year-old male with left hydropneumothorax Radiation dose: 0 mGy Conscious sedation time: 20 Minutes Procedure: The patient was brought to the interventional radiology area and placed in the right lateral decubitus position on his hospital bed  Limited ultrasound showed a left pleural effusion  The skin was then marked, prepped, and draped in usual sterile fashion  Lidocaine was administered to the skin and a small skin incision was made   A 19 gauge needle was advanced into the left pleural space  A J wire was advanced through the needle, and the needle was removed  After tract dilatation, a 10 [place] all-purpose drainage catheter was advanced, and the loop formed in the pleural space  The catheter was connected to an [facility]  Postprocedure ultrasound demonstrated a left chest tube in appropriate location  The patient tolerated the procedure well and suffered no complications  Impression: Impression: 1  Successful placement of a therapeutic and [place] all-purpose drainage catheter into the left pleural space under ultrasound guidance  Workstation performed: [id number]         ** Please Note: Dragon 360 Dictation voice to text software was used in the creation of this document   **

## 2021-02-27 NOTE — PLAN OF CARE
Problem: Potential for Falls  Goal: Patient will remain free of falls  Description: INTERVENTIONS:  - Assess patient frequently for physical needs  -  Identify cognitive and physical deficits and behaviors that affect risk of falls    -  [place] fall precautions as indicated by assessment   - Educate patient/family on patient safety including physical limitations  - Instruct patient to call for assistance with activity based on assessment  - Modify environment to reduce risk of injury  - Consider OT/PT consult to assist with strengthening/mobility  Outcome: Progressing     Problem: Prexisting or High Potential for Compromised Skin Integrity  Goal: Skin integrity is maintained or improved  Description: INTERVENTIONS:  - Identify patients at risk for skin breakdown  - Assess and monitor skin integrity  - Assess and monitor nutrition and hydration status  - Monitor labs   - Assess for incontinence   - Turn and reposition patient  - Assist with mobility/ambulation  - Relieve pressure over bony prominences  - Avoid friction and shearing  - Provide appropriate hygiene as needed including keeping skin clean and dry  - Evaluate need for skin moisturizer/barrier cream  - Collaborate with interdisciplinary team   - Patient/family teaching  - Consider wound care consult   Outcome: Progressing     Problem: PAIN - ADULT  Goal: Verbalizes/displays adequate comfort level or baseline comfort level  Description: Interventions:  - Encourage patient to monitor pain and request assistance  - Assess pain using appropriate pain scale  - Administer analgesics based on type and severity of pain and evaluate response  - Implement non-pharmacological measures as appropriate and evaluate response  - Consider cultural and social influences on pain and pain management  - Notify physician/advanced practitioner if interventions unsuccessful or patient reports new pain  Outcome: Progressing     Problem: INFECTION - ADULT  Goal: Absence or prevention of progression during hospitalization  Description: INTERVENTIONS:  - Assess and monitor for signs and symptoms of infection  - Monitor lab/diagnostic results  - Monitor all insertion sites, i e  indwelling lines, tubes, and drains  - Monitor endotracheal if appropriate and nasal secretions for changes in amount and color  - [place] appropriate cooling/warming therapies per order  - Administer medications as ordered  - Instruct and encourage patient and family to use good hand hygiene technique  - Identify and instruct in appropriate isolation precautions for identified infection/condition  Outcome: Progressing  Goal: Absence of fever/infection during neutropenic period  Description: INTERVENTIONS:  - Monitor WBC    Outcome: Progressing     Problem: SAFETY ADULT  Goal: Patient will remain free of falls  Description: INTERVENTIONS:  - Assess patient frequently for physical needs  -  Identify cognitive and physical deficits and behaviors that affect risk of falls    -  [place] fall precautions as indicated by assessment   - Educate patient/family on patient safety including physical limitations  - Instruct patient to call for assistance with activity based on assessment  - Modify environment to reduce risk of injury  - Consider OT/PT consult to assist with strengthening/mobility  Outcome: Progressing  Goal: Maintain or return to baseline ADL function  Description: INTERVENTIONS:  -  Assess patient's ability to carry out ADLs; assess patient's baseline for ADL function and identify physical deficits which impact ability to perform ADLs (bathing, care of mouth/teeth, toileting, grooming, dressing, etc )  - Assess/evaluate cause of self-care deficits   - Assess range of motion  - Assess patient's mobility; develop plan if impaired  - Assess patient's need for assistive devices and provide as appropriate  - Encourage maximum independence but intervene and supervise when necessary  - Involve family in performance of ADLs  - Assess for home care needs following discharge   - Consider OT consult to assist with ADL evaluation and planning for discharge  - Provide patient education as appropriate  Outcome: Progressing  Goal: Maintain or return mobility status to optimal level  Description: INTERVENTIONS:  - Assess patient's baseline mobility status (ambulation, transfers, stairs, etc )    - Identify cognitive and physical deficits and behaviors that affect mobility  - Identify mobility aids required to assist with transfers and/or ambulation (gait belt, sit-to-stand, lift, walker, cane, etc )  - [place] fall precautions as indicated by assessment  - Record patient progress and toleration of activity level on Mobility SBAR; progress patient to next Phase/Stage  - Instruct patient to call for assistance with activity based on assessment  - Consider rehabilitation consult to assist with strengthening/weightbearing, etc   Outcome: Progressing     Problem: DISCHARGE PLANNING  Goal: Discharge to home or other facility with appropriate resources  Description: INTERVENTIONS:  - Identify barriers to discharge w/patient and caregiver  - Arrange for needed discharge resources and transportation as appropriate  - Identify discharge learning needs (meds, wound care, etc )  - Arrange for interpretive services to assist at discharge as needed  - Refer to Case Management Department for coordinating discharge planning if the patient needs post-hospital services based on physician/advanced practitioner order or complex needs related to functional status, cognitive ability, or social support system  Outcome: Progressing     Problem: Knowledge Deficit  Goal: Patient/family/caregiver demonstrates understanding of disease process, treatment plan, medications, and discharge instructions  Description: Complete learning assessment and assess knowledge base    Interventions:  - Provide teaching at level of understanding  - Provide teaching via preferred learning methods  Outcome: Progressing     Problem: Nutrition/Hydration-ADULT  Goal: Nutrient/Hydration intake appropriate for improving, restoring or maintaining nutritional needs  Description: Monitor and assess patient's nutrition/hydration status for malnutrition  Collaborate with interdisciplinary team and initiate plan and interventions as ordered  Monitor patient's weight and dietary intake as ordered or per policy  Utilize nutrition screening tool and intervene as necessary  Determine patient's food preferences and provide high-protein, high-caloric foods as appropriate       INTERVENTIONS:  - Monitor oral intake, urinary output, labs, and treatment plans  - Assess nutrition and hydration status and recommend course of action  - Evaluate amount of meals eaten  - Assist patient with eating if necessary   - Allow adequate time for meals  - Recommend/ encourage appropriate diets, oral nutritional supplements, and vitamin/mineral supplements  - Order, calculate, and assess calorie counts as needed  - Recommend, monitor, and adjust tube feedings and TPN/PPN based on assessed needs  - Assess need for intravenous fluids  - Provide specific nutrition/hydration education as appropriate  - Include patient/family/caregiver in decisions related to nutrition  Outcome: Progressing     Problem: RESPIRATORY - ADULT  Goal: Achieves optimal ventilation and oxygenation  Description: INTERVENTIONS:  - Assess for changes in respiratory status  - Assess for changes in mentation and behavior  - Position to facilitate oxygenation and minimize respiratory effort  - Oxygen administered by appropriate delivery if ordered  - Initiate smoking cessation education as indicated  - Encourage broncho-pulmonary hygiene including cough, deep breathe, Incentive Spirometry  - Assess the need for suctioning and aspirate as needed  - Assess and instruct to report SOB or any respiratory difficulty  - Respiratory Therapy support as indicated  Outcome: Progressing     Problem: SKIN/TISSUE INTEGRITY - ADULT  Goal: Skin integrity remains intact  Description: INTERVENTIONS  - Identify patients at risk for skin breakdown  - Assess and monitor skin integrity  - Assess and monitor nutrition and hydration status  - Monitor labs (i e  albumin)  - Assess for incontinence   - Turn and reposition patient  - Assist with mobility/ambulation  - Relieve pressure over bony prominences  - Avoid friction and shearing  - Provide appropriate hygiene as needed including keeping skin clean and dry  - Evaluate need for skin moisturizer/barrier cream  - Collaborate with interdisciplinary team (i e  Nutrition, Rehabilitation, etc )   - Patient/family teaching  Outcome: Progressing  Goal: Incision(s), wounds(s) or drain site(s) healing without S/S of infection  Description: INTERVENTIONS  - Assess and document risk factors for skin impairment   - Assess and document dressing, incision, wound bed, drain sites and surrounding tissue  - Consider nutrition services referral as needed  - Oral mucous membranes remain intact  - Provide patient/ family education  Outcome: Progressing     Problem: MUSCULOSKELETAL - ADULT  Goal: Maintain or return mobility to safest level of function  Description: INTERVENTIONS:  - Assess patient's ability to carry out ADLs; assess patient's baseline for ADL function and identify physical deficits which impact ability to perform ADLs (bathing, care of mouth/teeth, toileting, grooming, dressing, etc )  - Assess/evaluate cause of self-care deficits   - Assess range of motion  - Assess patient's mobility  - Assess patient's need for assistive devices and provide as appropriate  - Encourage maximum independence but intervene and supervise when necessary  - Involve family in performance of ADLs  - Assess for home care needs following discharge   - Consider OT consult to assist with ADL evaluation and planning for discharge  - Provide patient education as appropriate  Outcome: Progressing  Goal: Maintain proper alignment of affected body part  Description: INTERVENTIONS:  - Support, maintain and protect limb and body alignment  - Provide patient/ family with appropriate education  Outcome: Progressing

## 2021-02-28 LAB
GLUCOSE SERPL-MCNC: 199 MG/DL (ref 65–140)
GLUCOSE SERPL-MCNC: 229 MG/DL (ref 65–140)
GLUCOSE SERPL-MCNC: 325 MG/DL (ref 65–140)

## 2021-02-28 PROCEDURE — 87205 SMEAR GRAM STAIN: CPT | Performed by: INTERNAL MEDICINE

## 2021-02-28 PROCEDURE — 87186 SC STD MICRODIL/AGAR DIL: CPT | Performed by: INTERNAL MEDICINE

## 2021-02-28 PROCEDURE — 82948 REAGENT STRIP/BLOOD GLUCOSE: CPT

## 2021-02-28 PROCEDURE — 87077 CULTURE AEROBIC IDENTIFY: CPT | Performed by: INTERNAL MEDICINE

## 2021-02-28 PROCEDURE — 87070 CULTURE OTHR SPECIMN AEROBIC: CPT | Performed by: INTERNAL MEDICINE

## 2021-02-28 PROCEDURE — 93971 EXTREMITY STUDY: CPT | Performed by: SURGERY

## 2021-02-28 PROCEDURE — 94668 MNPJ CHEST WALL SBSQ: CPT

## 2021-02-28 PROCEDURE — 94760 N-INVAS EAR/PLS OXIMETRY 1: CPT

## 2021-02-28 PROCEDURE — 94664 DEMO&/EVAL PT USE INHALER: CPT

## 2021-02-28 PROCEDURE — 99232 SBSQ HOSP IP/OBS MODERATE 35: CPT | Performed by: INTERNAL MEDICINE

## 2021-02-28 PROCEDURE — 94640 AIRWAY INHALATION TREATMENT: CPT

## 2021-02-28 RX ORDER — INSULIN GLARGINE 100 [IU]/ML
10 INJECTION, SOLUTION SUBCUTANEOUS
Status: DISCONTINUED | OUTPATIENT
Start: 2021-02-28 | End: 2021-03-01

## 2021-02-28 RX ADMIN — GUAIFENESIN 600 MG: 600 TABLET, EXTENDED RELEASE ORAL at 08:22

## 2021-02-28 RX ADMIN — APIXABAN 10 MG: 5 TABLET, FILM COATED ORAL at 17:05

## 2021-02-28 RX ADMIN — IPRATROPIUM BROMIDE 0.5 MG: 0.5 SOLUTION RESPIRATORY (INHALATION) at 08:20

## 2021-02-28 RX ADMIN — IPRATROPIUM BROMIDE 0.5 MG: 0.5 SOLUTION RESPIRATORY (INHALATION) at 19:25

## 2021-02-28 RX ADMIN — GLYCOPYRROLATE 1 MG: 1 TABLET ORAL at 17:05

## 2021-02-28 RX ADMIN — LEVALBUTEROL HYDROCHLORIDE 1.25 MG: 1.25 SOLUTION, CONCENTRATE RESPIRATORY (INHALATION) at 08:19

## 2021-02-28 RX ADMIN — GLYCOPYRROLATE 1 MG: 1 TABLET ORAL at 21:27

## 2021-02-28 RX ADMIN — IPRATROPIUM BROMIDE 0.5 MG: 0.5 SOLUTION RESPIRATORY (INHALATION) at 14:05

## 2021-02-28 RX ADMIN — BUDESONIDE 0.5 MG: 0.5 INHALANT ORAL at 08:19

## 2021-02-28 RX ADMIN — FORMOTEROL FUMARATE DIHYDRATE 20 MCG: 20 SOLUTION RESPIRATORY (INHALATION) at 08:20

## 2021-02-28 RX ADMIN — MICAFUNGIN SODIUM 100 MG: 50 INJECTION, POWDER, LYOPHILIZED, FOR SOLUTION INTRAVENOUS at 08:23

## 2021-02-28 RX ADMIN — INSULIN GLARGINE 10 UNITS: 100 INJECTION, SOLUTION SUBCUTANEOUS at 21:25

## 2021-02-28 RX ADMIN — Medication 1 TABLET: at 08:22

## 2021-02-28 RX ADMIN — LEVALBUTEROL HYDROCHLORIDE 1.25 MG: 1.25 SOLUTION, CONCENTRATE RESPIRATORY (INHALATION) at 14:05

## 2021-02-28 RX ADMIN — INSULIN LISPRO 5 UNITS: 100 INJECTION, SOLUTION INTRAVENOUS; SUBCUTANEOUS at 17:05

## 2021-02-28 RX ADMIN — FORMOTEROL FUMARATE DIHYDRATE 20 MCG: 20 SOLUTION RESPIRATORY (INHALATION) at 19:25

## 2021-02-28 RX ADMIN — GLYCOPYRROLATE 1 MG: 1 TABLET ORAL at 08:29

## 2021-02-28 RX ADMIN — IPRATROPIUM BROMIDE 0.5 MG: 0.5 SOLUTION RESPIRATORY (INHALATION) at 00:06

## 2021-02-28 RX ADMIN — Medication 2000 UNITS: at 08:22

## 2021-02-28 RX ADMIN — LEVALBUTEROL HYDROCHLORIDE 1.25 MG: 1.25 SOLUTION, CONCENTRATE RESPIRATORY (INHALATION) at 00:06

## 2021-02-28 RX ADMIN — BUDESONIDE 0.5 MG: 0.5 INHALANT ORAL at 19:25

## 2021-02-28 RX ADMIN — LISINOPRIL 10 MG: 10 TABLET ORAL at 08:22

## 2021-02-28 RX ADMIN — ACETAMINOPHEN 650 MG: 325 TABLET, FILM COATED ORAL at 21:27

## 2021-02-28 RX ADMIN — GUAIFENESIN 600 MG: 600 TABLET, EXTENDED RELEASE ORAL at 21:27

## 2021-02-28 RX ADMIN — APIXABAN 10 MG: 5 TABLET, FILM COATED ORAL at 08:22

## 2021-02-28 RX ADMIN — INSULIN LISPRO 5 UNITS: 100 INJECTION, SOLUTION INTRAVENOUS; SUBCUTANEOUS at 11:18

## 2021-02-28 RX ADMIN — LEVALBUTEROL HYDROCHLORIDE 1.25 MG: 1.25 SOLUTION, CONCENTRATE RESPIRATORY (INHALATION) at 19:25

## 2021-02-28 RX ADMIN — METHADONE HYDROCHLORIDE 70 MG: 10 TABLET ORAL at 08:23

## 2021-02-28 NOTE — PLAN OF CARE
Problem: Potential for Falls  Goal: Patient will remain free of falls  Description: INTERVENTIONS:  - Assess patient frequently for physical needs  -  Identify cognitive and physical deficits and behaviors that affect risk of falls    -  [place] fall precautions as indicated by assessment   - Educate patient/family on patient safety including physical limitations  - Instruct patient to call for assistance with activity based on assessment  - Modify environment to reduce risk of injury  - Consider OT/PT consult to assist with strengthening/mobility  Outcome: Progressing     Problem: Prexisting or High Potential for Compromised Skin Integrity  Goal: Skin integrity is maintained or improved  Description: INTERVENTIONS:  - Identify patients at risk for skin breakdown  - Assess and monitor skin integrity  - Assess and monitor nutrition and hydration status  - Monitor labs   - Assess for incontinence   - Turn and reposition patient  - Assist with mobility/ambulation  - Relieve pressure over bony prominences  - Avoid friction and shearing  - Provide appropriate hygiene as needed including keeping skin clean and dry  - Evaluate need for skin moisturizer/barrier cream  - Collaborate with interdisciplinary team   - Patient/family teaching  - Consider wound care consult   Outcome: Progressing     Problem: PAIN - ADULT  Goal: Verbalizes/displays adequate comfort level or baseline comfort level  Description: Interventions:  - Encourage patient to monitor pain and request assistance  - Assess pain using appropriate pain scale  - Administer analgesics based on type and severity of pain and evaluate response  - Implement non-pharmacological measures as appropriate and evaluate response  - Consider cultural and social influences on pain and pain management  - Notify physician/advanced practitioner if interventions unsuccessful or patient reports new pain  Outcome: Progressing     Problem: INFECTION - ADULT  Goal: Absence or prevention of progression during hospitalization  Description: INTERVENTIONS:  - Assess and monitor for signs and symptoms of infection  - Monitor lab/diagnostic results  - Monitor all insertion sites, i e  indwelling lines, tubes, and drains  - Monitor endotracheal if appropriate and nasal secretions for changes in amount and color  - [place] appropriate cooling/warming therapies per order  - Administer medications as ordered  - Instruct and encourage patient and family to use good hand hygiene technique  - Identify and instruct in appropriate isolation precautions for identified infection/condition  Outcome: Progressing     Problem: SAFETY ADULT  Goal: Patient will remain free of falls  Description: INTERVENTIONS:  - Assess patient frequently for physical needs  -  Identify cognitive and physical deficits and behaviors that affect risk of falls    -  [place] fall precautions as indicated by assessment   - Educate patient/family on patient safety including physical limitations  - Instruct patient to call for assistance with activity based on assessment  - Modify environment to reduce risk of injury  - Consider OT/PT consult to assist with strengthening/mobility  Outcome: Progressing  Goal: Maintain or return to baseline ADL function  Description: INTERVENTIONS:  -  Assess patient's ability to carry out ADLs; assess patient's baseline for ADL function and identify physical deficits which impact ability to perform ADLs (bathing, care of mouth/teeth, toileting, grooming, dressing, etc )  - Assess/evaluate cause of self-care deficits   - Assess range of motion  - Assess patient's mobility; develop plan if impaired  - Assess patient's need for assistive devices and provide as appropriate  - Encourage maximum independence but intervene and supervise when necessary  - Involve family in performance of ADLs  - Assess for home care needs following discharge   - Consider OT consult to assist with ADL evaluation and planning for discharge  - Provide patient education as appropriate  Outcome: Progressing  Goal: Maintain or return mobility status to optimal level  Description: INTERVENTIONS:  - Assess patient's baseline mobility status (ambulation, transfers, stairs, etc )    - Identify cognitive and physical deficits and behaviors that affect mobility  - Identify mobility aids required to assist with transfers and/or ambulation (gait belt, sit-to-stand, lift, walker, cane, etc )  - [place] fall precautions as indicated by assessment  - Record patient progress and toleration of activity level on Mobility SBAR; progress patient to next Phase/Stage  - Instruct patient to call for assistance with activity based on assessment  - Consider rehabilitation consult to assist with strengthening/weightbearing, etc   Outcome: Progressing     Problem: DISCHARGE PLANNING  Goal: Discharge to home or other facility with appropriate resources  Description: INTERVENTIONS:  - Identify barriers to discharge w/patient and caregiver  - Arrange for needed discharge resources and transportation as appropriate  - Identify discharge learning needs (meds, wound care, etc )  - Arrange for interpretive services to assist at discharge as needed  - Refer to Case Management Department for coordinating discharge planning if the patient needs post-hospital services based on physician/advanced practitioner order or complex needs related to functional status, cognitive ability, or social support system  Outcome: Progressing     Problem: Knowledge Deficit  Goal: Patient/family/caregiver demonstrates understanding of disease process, treatment plan, medications, and discharge instructions  Description: Complete learning assessment and assess knowledge base    Interventions:  - Provide teaching at level of understanding  - Provide teaching via preferred learning methods  Outcome: Progressing     Problem: Nutrition/Hydration-ADULT  Goal: Nutrient/Hydration intake appropriate for improving, restoring or maintaining nutritional needs  Description: Monitor and assess patient's nutrition/hydration status for malnutrition  Collaborate with interdisciplinary team and initiate plan and interventions as ordered  Monitor patient's weight and dietary intake as ordered or per policy  Utilize nutrition screening tool and intervene as necessary  Determine patient's food preferences and provide high-protein, high-caloric foods as appropriate       INTERVENTIONS:  - Monitor oral intake, urinary output, labs, and treatment plans  - Assess nutrition and hydration status and recommend course of action  - Evaluate amount of meals eaten  - Assist patient with eating if necessary   - Allow adequate time for meals  - Recommend/ encourage appropriate diets, oral nutritional supplements, and vitamin/mineral supplements  - Order, calculate, and assess calorie counts as needed  - Recommend, monitor, and adjust tube feedings and TPN/PPN based on assessed needs  - Assess need for intravenous fluids  - Provide specific nutrition/hydration education as appropriate  - Include patient/family/caregiver in decisions related to nutrition  Outcome: Progressing     Problem: RESPIRATORY - ADULT  Goal: Achieves optimal ventilation and oxygenation  Description: INTERVENTIONS:  - Assess for changes in respiratory status  - Assess for changes in mentation and behavior  - Position to facilitate oxygenation and minimize respiratory effort  - Oxygen administered by appropriate delivery if ordered  - Initiate smoking cessation education as indicated  - Encourage broncho-pulmonary hygiene including cough, deep breathe, Incentive Spirometry  - Assess the need for suctioning and aspirate as needed  - Assess and instruct to report SOB or any respiratory difficulty  - Respiratory Therapy support as indicated  Outcome: Progressing     Problem: SKIN/TISSUE INTEGRITY - ADULT  Goal: Skin integrity remains intact  Description: INTERVENTIONS  - Identify patients at risk for skin breakdown  - Assess and monitor skin integrity  - Assess and monitor nutrition and hydration status  - Monitor labs (i e  albumin)  - Assess for incontinence   - Turn and reposition patient  - Assist with mobility/ambulation  - Relieve pressure over bony prominences  - Avoid friction and shearing  - Provide appropriate hygiene as needed including keeping skin clean and dry  - Evaluate need for skin moisturizer/barrier cream  - Collaborate with interdisciplinary team (i e  Nutrition, Rehabilitation, etc )   - Patient/family teaching  Outcome: Progressing  Goal: Incision(s), wounds(s) or drain site(s) healing without S/S of infection  Description: INTERVENTIONS  - Assess and document risk factors for skin impairment   - Assess and document dressing, incision, wound bed, drain sites and surrounding tissue  - Consider nutrition services referral as needed  - Oral mucous membranes remain intact  - Provide patient/ family education  Outcome: Progressing     Problem: MUSCULOSKELETAL - ADULT  Goal: Maintain or return mobility to safest level of function  Description: INTERVENTIONS:  - Assess patient's ability to carry out ADLs; assess patient's baseline for ADL function and identify physical deficits which impact ability to perform ADLs (bathing, care of mouth/teeth, toileting, grooming, dressing, etc )  - Assess/evaluate cause of self-care deficits   - Assess range of motion  - Assess patient's mobility  - Assess patient's need for assistive devices and provide as appropriate  - Encourage maximum independence but intervene and supervise when necessary  - Involve family in performance of ADLs  - Assess for home care needs following discharge   - Consider OT consult to assist with ADL evaluation and planning for discharge  - Provide patient education as appropriate  Outcome: Progressing  Goal: Maintain proper alignment of affected body part  Description: INTERVENTIONS:  - Support, maintain and protect limb and body alignment  - Provide patient/ family with appropriate education  Outcome: Progressing

## 2021-02-28 NOTE — PROGRESS NOTES
Progress Note - Gavin Russell 1960, 56 y o  male MRN: [id number]    Unit/Bed#: -01 Encounter: [id number]    Primary Care Provider: VERONICA Mccullough   Date and time admitted to hospital: 2/26/2021  3:41 PM        * Acute respiratory failure with hypoxia [facility])  Assessment & Plan  Multifactorial  Continue supplemental oxygen  Wean as tolerated to keep O2 sats more than 90-92%  Pulmonary following  Pulmonary input noted    Left Hydropneumothorax  Assessment & Plan  · Improving as per CTA PE study  · S/p prior pigtail, has been removed  · Continue micafungin through 03/12/2021    Right femoral vein DVT (HCC)  Assessment & Plan  · Continue Eliquis    Ischemia of right lower extremity with suspected rhabdomyolysis  Assessment & Plan  · 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  · Continue Eliquis, wound care    Type 2 diabetes mellitus, with long-term current use of insulin [facility])  Assessment & Plan  Lab Results   Component Value Date    HGBA1C 11 6 (H) 12/09/2020       Recent Labs     02/25/21  1656 02/27/21  1625 02/27/21  2111 02/28/21  1048   POCGLU 121 213* 293* 325*       Blood Sugar Average: Last 72 hrs:  · (P) 277     Continue Lantus, and Humalog t i d  With meals  Titrate insulin dose based on Accu-Cheks  Avoid hypoglycemia  Hypoglycemia protocol in place    Methadone maintenance therapy patient [facility])  Assessment & Plan  · On chronic methadone 70 mg daily      VTE Pharmacologic Prophylaxis:   Pharmacologic: Apixaban (Eliquis)  Mechanical VTE Prophylaxis in Place: Yes    Patient Centered Rounds: I have performed bedside rounds with nursing staff today  Discussions with Specialists or Other Care Team Provider:     Education and Discussions with Family / Patient:  Patient, updated nephew Lea Khan     Time Spent for Care: 30 minutes  More than 50% of total time spent on counseling and coordination of care as described above      Current Length of Stay: 0 day(s)    Current Patient Status: Inpatient   Certification Statement: The patient, admitted on an observation basis, will now require > 2 midnight hospital stay due to Acute hypoxic respiratory failure requiring further evaluation management pulmonary inputs as outlined    Discharge Plan: awaiting and symptomatic improvement    Code Status: Level 1 - Full Code      Subjective:     Reports feeling tired  Appetite fair to poor  Encouraged out of bed into chair  Reports dyspnea and shortness of breath on minimal exertion    Objective:     Vitals:   Temp (24hrs), Av 7 °F (36 5 °C), Min:97 7 °F (36 5 °C), Max:97 7 °F (36 5 °C)    Temp:  [97 7 °F (36 5 °C)] 97 7 °F (36 5 °C)  HR:  [76-79] 79  BP: (106-135)/(59-79) 135/79  SpO2:  [97 %-99 %] 99 %  Body mass index is 23 48 kg/m²  Input and Output Summary (last 24 hours):        Intake/Output Summary (Last 24 hours) at 2021 1209  Last data filed at 2021 1026  Gross per 24 hour   Intake 1020 ml   Output 1375 ml   Net -355 ml       Physical Exam:     Physical Exam    Comfortably sitting up in bed  Features of protein calorie malnutrition noted  Neck supple  Lungs diminished breath sounds bilaterally  Heart sounds S1 and S2 noted  Abdomen soft  Awake obeys simple commands  Pulses noted  No rash    Additional Data:     Labs:    Results from last 7 days   Lab Units 21  0612 21  1628   WBC Thousand/uL 10 46* 20 01*   HEMOGLOBIN g/dL 8 1* 9 2*   HEMATOCRIT % 26 9* 30 3*   PLATELETS Thousands/uL 272 393*   NEUTROS PCT %  --  95*   LYMPHS PCT %  --  3*   MONOS PCT %  --  1*   EOS PCT %  --  0     Results from last 7 days   Lab Units 21  0612 21  1628   SODIUM mmol/L 139 132*   POTASSIUM mmol/L 3 8 4 8   CHLORIDE mmol/L 108 102   CO2 mmol/L 26 28   BUN mg/dL 15 17   CREATININE mg/dL 0 44* 0 64   ANION GAP mmol/L 5 2*   CALCIUM mg/dL 8 6 8 7   ALBUMIN g/dL  --  1 5*   TOTAL BILIRUBIN mg/dL  --  0 39   ALK PHOS U/L  --  114   ALT U/L  --  8*   AST U/L  --  11   GLUCOSE RANDOM mg/dL 161* 282*     Results from last 7 days   Lab Units 02/26/21  1628   INR  2 77*     Results from last 7 days   Lab Units 02/28/21  1048 02/27/21  2111 02/27/21  1625 02/25/21  1656 02/25/21  1116 02/25/21  0735 02/24/21  2055 02/24/21  1604 02/24/21  1054 02/24/21  0629 02/23/21  2113 02/23/21  1611   POC GLUCOSE mg/dl 325* 293* 213* 121 220* 159* 154* 154* 141* 112 125 167*                   * I Have Reviewed All Lab Data Listed Above  * Additional Pertinent Lab Tests Reviewed:  All Labs Within Last 24 Hours Reviewed    Imaging:    Imaging Reports Reviewed Today Include:   Imaging Personally Reviewed by Myself Includes:    Recent Cultures (last 7 days):           Last 24 Hours Medication List:   Current Facility-Administered Medications   Medication Dose Route Frequency Provider Last Rate    acetaminophen  650 mg Oral Q4H PRN Luiz Díaz MD      albuterol  2 puff Inhalation Q4H PRN Gordy Pastor DO      apixaban  10 mg Oral BID Gordy Pastor DO      bisacodyl  10 mg Rectal Daily PRN Gordy Pastor DO      budesonide  0 5 mg Nebulization Q12H Arne Spatz, DO      cholecalciferol  2,000 Units Oral Daily Gordy Pastor DO      formoterol  20 mcg Nebulization Q12H Arne Spatz, DO      glycopyrrolate  1 mg Oral TID Gordy Pastor DO      guaiFENesin  600 mg Oral Q12H [address] Arne Spatz, DO      insulin glargine  10 Units Subcutaneous HS Luiz Díaz MD      insulin lispro  5 Units Subcutaneous TID With Meals Luiz Díaz MD      ipratropium  0 5 mg Nebulization Q6H Luiz Díaz MD      levalbuterol  1 25 mg Nebulization Q6H Luiz Díaz MD      Lidocaine Viscous HCl  15 mL Swish & Spit 4x Daily PRN Gordy Pastor, DO      lisinopril  10 mg Oral Daily Gordy Pastor DO      menthol-methyl salicylate   Apply externally 4x Daily PRN Gordy Pastor, DO      methadone  70 mg Oral Daily Luiz Díaz MD      micafungin (MYCAMINE) 100 mL IVPB 100 mg Intravenous Q24H Timothy Devoid, DO Stopped (02/28/21 8470)    multivitamin-minerals  1 tablet Oral Daily Isanti Devoid, DO      ondansetron  4 mg Intravenous Q6H PRN Isanti Devoid, DO      oxyCODONE  2 5 mg Oral Q6H PRN Davis Wilson MD      polyethylene glycol  17 g Oral Daily Isanti Devoid, DO          Today, Patient Was Seen By: Davis Wilson MD    ** Please Note: Dictation voice to text software may have been used in the creation of this document   **

## 2021-02-28 NOTE — PHYSICAL THERAPY NOTE
02/28/21 1000   Note Type   Note type Evaluation   Cancel Reasons Other   Pt declines PT evaluation due to SOB/fatigue  Agreeable to trying tomorrow  Will follow    Yolanda Lopez PT, DPT CSRS

## 2021-02-28 NOTE — ASSESSMENT & PLAN NOTE
Multifactorial  Continue supplemental oxygen  Wean as tolerated to keep O2 sats more than 90-92%  Pulmonary following  Pulmonary input noted

## 2021-02-28 NOTE — PLAN OF CARE
Problem: Nutrition/Hydration-ADULT  Goal: Nutrient/Hydration intake appropriate for improving, restoring or maintaining nutritional needs  Description: Monitor and assess patient's nutrition/hydration status for malnutrition  Collaborate with interdisciplinary team and initiate plan and interventions as ordered  Monitor patient's weight and dietary intake as ordered or per policy  Utilize nutrition screening tool and intervene as necessary  Determine patient's food preferences and provide high-protein, high-caloric foods as appropriate       INTERVENTIONS:  - Monitor oral intake, urinary output, labs, and treatment plans  - Assess nutrition and hydration status and recommend course of action  - Evaluate amount of meals eaten  - Assist patient with eating if necessary   - Allow adequate time for meals  - Recommend/ encourage appropriate diets, oral nutritional supplements, and vitamin/mineral supplements  - Order, calculate, and assess calorie counts as needed  - Recommend, monitor, and adjust tube feedings and TPN/PPN based on assessed needs  - Assess need for intravenous fluids  - Provide specific nutrition/hydration education as appropriate  - Include patient/family/caregiver in decisions related to nutrition  2/28/2021 1710 by Veronica Carlos RD  Outcome: Progressing  2/28/2021 1709 by Veronica Carlos RD  Outcome: Progressing

## 2021-02-28 NOTE — ASSESSMENT & PLAN NOTE
Lab Results   Component Value Date    HGBA1C 11 6 (H) 12/09/2020       Recent Labs     02/25/21  1656 02/27/21  1625 02/27/21  2111 02/28/21  1048   POCGLU 121 213* 293* 325*       Blood Sugar Average: Last 72 hrs:  · (P) 277     Continue Lantus, and Humalog t i d   With meals  Titrate insulin dose based on Accu-Cheks  Avoid hypoglycemia  Hypoglycemia protocol in place

## 2021-02-28 NOTE — PLAN OF CARE
Problem: Potential for Falls  Goal: Patient will remain free of falls  Description: INTERVENTIONS:  - Assess patient frequently for physical needs  -  Identify cognitive and physical deficits and behaviors that affect risk of falls    -  [place] fall precautions as indicated by assessment   - Educate patient/family on patient safety including physical limitations  - Instruct patient to call for assistance with activity based on assessment  - Modify environment to reduce risk of injury  - Consider OT/PT consult to assist with strengthening/mobility  Outcome: Progressing     Problem: Prexisting or High Potential for Compromised Skin Integrity  Goal: Skin integrity is maintained or improved  Description: INTERVENTIONS:  - Identify patients at risk for skin breakdown  - Assess and monitor skin integrity  - Assess and monitor nutrition and hydration status  - Monitor labs   - Assess for incontinence   - Turn and reposition patient  - Assist with mobility/ambulation  - Relieve pressure over bony prominences  - Avoid friction and shearing  - Provide appropriate hygiene as needed including keeping skin clean and dry  - Evaluate need for skin moisturizer/barrier cream  - Collaborate with interdisciplinary team   - Patient/family teaching  - Consider wound care consult   Outcome: Progressing     Problem: PAIN - ADULT  Goal: Verbalizes/displays adequate comfort level or baseline comfort level  Description: Interventions:  - Encourage patient to monitor pain and request assistance  - Assess pain using appropriate pain scale  - Administer analgesics based on type and severity of pain and evaluate response  - Implement non-pharmacological measures as appropriate and evaluate response  - Consider cultural and social influences on pain and pain management  - Notify physician/advanced practitioner if interventions unsuccessful or patient reports new pain  Outcome: Progressing     Problem: INFECTION - ADULT  Goal: Absence or prevention of progression during hospitalization  Description: INTERVENTIONS:  - Assess and monitor for signs and symptoms of infection  - Monitor lab/diagnostic results  - Monitor all insertion sites, i e  indwelling lines, tubes, and drains  - Monitor endotracheal if appropriate and nasal secretions for changes in amount and color  - [place] appropriate cooling/warming therapies per order  - Administer medications as ordered  - Instruct and encourage patient and family to use good hand hygiene technique  - Identify and instruct in appropriate isolation precautions for identified infection/condition  Outcome: Progressing     Problem: SAFETY ADULT  Goal: Patient will remain free of falls  Description: INTERVENTIONS:  - Assess patient frequently for physical needs  -  Identify cognitive and physical deficits and behaviors that affect risk of falls    -  [place] fall precautions as indicated by assessment   - Educate patient/family on patient safety including physical limitations  - Instruct patient to call for assistance with activity based on assessment  - Modify environment to reduce risk of injury  - Consider OT/PT consult to assist with strengthening/mobility  Outcome: Progressing  Goal: Maintain or return to baseline ADL function  Description: INTERVENTIONS:  -  Assess patient's ability to carry out ADLs; assess patient's baseline for ADL function and identify physical deficits which impact ability to perform ADLs (bathing, care of mouth/teeth, toileting, grooming, dressing, etc )  - Assess/evaluate cause of self-care deficits   - Assess range of motion  - Assess patient's mobility; develop plan if impaired  - Assess patient's need for assistive devices and provide as appropriate  - Encourage maximum independence but intervene and supervise when necessary  - Involve family in performance of ADLs  - Assess for home care needs following discharge   - Consider OT consult to assist with ADL evaluation and planning for discharge  - Provide patient education as appropriate  Outcome: Progressing  Goal: Maintain or return mobility status to optimal level  Description: INTERVENTIONS:  - Assess patient's baseline mobility status (ambulation, transfers, stairs, etc )    - Identify cognitive and physical deficits and behaviors that affect mobility  - Identify mobility aids required to assist with transfers and/or ambulation (gait belt, sit-to-stand, lift, walker, cane, etc )  - [place] fall precautions as indicated by assessment  - Record patient progress and toleration of activity level on Mobility SBAR; progress patient to next Phase/Stage  - Instruct patient to call for assistance with activity based on assessment  - Consider rehabilitation consult to assist with strengthening/weightbearing, etc   Outcome: Progressing     Problem: DISCHARGE PLANNING  Goal: Discharge to home or other facility with appropriate resources  Description: INTERVENTIONS:  - Identify barriers to discharge w/patient and caregiver  - Arrange for needed discharge resources and transportation as appropriate  - Identify discharge learning needs (meds, wound care, etc )  - Arrange for interpretive services to assist at discharge as needed  - Refer to Case Management Department for coordinating discharge planning if the patient needs post-hospital services based on physician/advanced practitioner order or complex needs related to functional status, cognitive ability, or social support system  Outcome: Progressing     Problem: Knowledge Deficit  Goal: Patient/family/caregiver demonstrates understanding of disease process, treatment plan, medications, and discharge instructions  Description: Complete learning assessment and assess knowledge base    Interventions:  - Provide teaching at level of understanding  - Provide teaching via preferred learning methods  Outcome: Progressing     Problem: Nutrition/Hydration-ADULT  Goal: Nutrient/Hydration intake appropriate for improving, restoring or maintaining nutritional needs  Description: Monitor and assess patient's nutrition/hydration status for malnutrition  Collaborate with interdisciplinary team and initiate plan and interventions as ordered  Monitor patient's weight and dietary intake as ordered or per policy  Utilize nutrition screening tool and intervene as necessary  Determine patient's food preferences and provide high-protein, high-caloric foods as appropriate       INTERVENTIONS:  - Monitor oral intake, urinary output, labs, and treatment plans  - Assess nutrition and hydration status and recommend course of action  - Evaluate amount of meals eaten  - Assist patient with eating if necessary   - Allow adequate time for meals  - Recommend/ encourage appropriate diets, oral nutritional supplements, and vitamin/mineral supplements  - Order, calculate, and assess calorie counts as needed  - Recommend, monitor, and adjust tube feedings and TPN/PPN based on assessed needs  - Assess need for intravenous fluids  - Provide specific nutrition/hydration education as appropriate  - Include patient/family/caregiver in decisions related to nutrition  Outcome: Progressing     Problem: RESPIRATORY - ADULT  Goal: Achieves optimal ventilation and oxygenation  Description: INTERVENTIONS:  - Assess for changes in respiratory status  - Assess for changes in mentation and behavior  - Position to facilitate oxygenation and minimize respiratory effort  - Oxygen administered by appropriate delivery if ordered  - Initiate smoking cessation education as indicated  - Encourage broncho-pulmonary hygiene including cough, deep breathe, Incentive Spirometry  - Assess the need for suctioning and aspirate as needed  - Assess and instruct to report SOB or any respiratory difficulty  - Respiratory Therapy support as indicated  Outcome: Progressing     Problem: SKIN/TISSUE INTEGRITY - ADULT  Goal: Skin integrity remains intact  Description: INTERVENTIONS  - Identify patients at risk for skin breakdown  - Assess and monitor skin integrity  - Assess and monitor nutrition and hydration status  - Monitor labs (i e  albumin)  - Assess for incontinence   - Turn and reposition patient  - Assist with mobility/ambulation  - Relieve pressure over bony prominences  - Avoid friction and shearing  - Provide appropriate hygiene as needed including keeping skin clean and dry  - Evaluate need for skin moisturizer/barrier cream  - Collaborate with interdisciplinary team (i e  Nutrition, Rehabilitation, etc )   - Patient/family teaching  Outcome: Progressing  Goal: Incision(s), wounds(s) or drain site(s) healing without S/S of infection  Description: INTERVENTIONS  - Assess and document risk factors for skin impairment   - Assess and document dressing, incision, wound bed, drain sites and surrounding tissue  - Consider nutrition services referral as needed  - Oral mucous membranes remain intact  - Provide patient/ family education  Outcome: Progressing     Problem: MUSCULOSKELETAL - ADULT  Goal: Maintain or return mobility to safest level of function  Description: INTERVENTIONS:  - Assess patient's ability to carry out ADLs; assess patient's baseline for ADL function and identify physical deficits which impact ability to perform ADLs (bathing, care of mouth/teeth, toileting, grooming, dressing, etc )  - Assess/evaluate cause of self-care deficits   - Assess range of motion  - Assess patient's mobility  - Assess patient's need for assistive devices and provide as appropriate  - Encourage maximum independence but intervene and supervise when necessary  - Involve family in performance of ADLs  - Assess for home care needs following discharge   - Consider OT consult to assist with ADL evaluation and planning for discharge  - Provide patient education as appropriate  Outcome: Progressing  Goal: Maintain proper alignment of affected body part  Description: INTERVENTIONS:  - Support, maintain and protect limb and body alignment  - Provide patient/ family with appropriate education  Outcome: Progressing

## 2021-02-28 NOTE — UTILIZATION REVIEW
UNABLE TO ATTACH CLINICAL TO EVIE CASE #  [id number]      Continued Stay Review   (completed  2/28)    Admission Orders (From admission, onward)     Ordered        02/28/21 1206  Inpatient Admission  Once         02/26/21 2309  Place in Observation  Once                   CHANGE to INPT status on  2/28  Due to ongoing need for  resp therapies/supplemental oxygen / close monitoring of resp status  Inpatient Admission Once     Question Answer   Level of Care Med Surg   Estimated length of stay More than 2 Midnights   Certification I certify that inpatient services are medically necessary for this patient for a duration of greater than two midnights  See H&P and MD Progress Notes for additional information about the patient's course of treatment  HPI:60 y o  male initially admitted on   2/26   For  Acute  resp failure   (baseline oxygen requirement was room air PTA) 2/2  Bronchiectasis w/acute exacerbation     2/27  Pulmonary  Start duoneb q 6; budesonide; mucinex; ck procal & sputum cx;  Cont IV Micafungin  HOLD off abx for now  2/28  Continues  To require increasing supplemental oxygen   Will cont duo nebs q 6 hrs, continuous pulse oximetry  Pertinent Labs/Diagnostic Results:   2/26  B/l LE doppler     neg for dvt     2/26  CT chest -  No  PE   decreasing size of loculated left hydropneumothorax following removal of removal of pigtail drainage catheter  Trace right hydropneumothorax  Comparing to prior exams increasing architectural distortion, bronchiectasis and suspected honeycombing throughout the bilateral lung bases consistent with fibrotic changes following patient's prior Covid pneumonia or alternatively an underlying  interstitial lung disease     Echo - LEFT VENTRICLE:    Systolic function was vigorous  Ejection fraction was estimated to be 70 %  There were no regional wall motion abnormalities    RIGHT VENTRICLE:   The size was normal     Systolic function was hyperdynamic  Results from last 7 days   Lab Units 02/27/21  0612 02/26/21  1628 02/25/21  0600 02/22/21  0606  02/21/21  1100   WBC Thousand/uL 10 46* 20 01* 11 03* 10 14   < >  --    HEMOGLOBIN g/dL 8 1* 9 2* 9 3* 8 7*  --  8 5*   HEMATOCRIT % 26 9* 30 3* 31 4* 29 9*  --  28 6*   PLATELETS Thousands/uL 272 393* 388 390   < >  --    NEUTROS ABS Thousands/µL  --  19 14* 9 14*  --   --   --     < > = values in this interval not displayed           Results from last 7 days   Lab Units 02/27/21  0612 02/26/21  1628 02/25/21  0600 02/22/21  0606   SODIUM mmol/L 139 132* 144 140   POTASSIUM mmol/L 3 8 4 8 3 0* 3 6   CHLORIDE mmol/L 108 102 112* 107   CO2 mmol/L 26 28 26 30   ANION GAP mmol/L 5 2* 6 3*   BUN mg/dL 15 17 13 13   CREATININE mg/dL 0 44* 0 64 0 33* 0 42*   EGFR ml/min/1 73sq m 122 105 138 125   CALCIUM mg/dL 8 6 8 7 7 4* 8 4     Results from last 7 days   Lab Units 02/27/21  2111 02/27/21  1625 02/25/21  1656 02/25/21  1116 02/25/21  0735 02/24/21  2055 02/24/21  1604 02/24/21  1054 02/24/21  0629 02/23/21  2113 02/23/21  1611 02/23/21  1111   POC GLUCOSE mg/dl 293* 213* 121 220* 159* 154* 154* 141* 112 125 167* 134     Results from last 7 days   Lab Units 02/27/21  0612 02/26/21  1628 02/25/21  0600 02/22/21  0606   GLUCOSE RANDOM mg/dL 161* 282* 135 114      Vital Signs:     02/28/21 07:48:28  97 7 °F (36 5 °C)  -- 135/79  98  97%  3L / nc    02/27/21 22:10:37  97 7 °F (36 5 °C)  79 106/59  75  99 %  3L NC    Medications:   Scheduled Medications:  apixaban, 10 mg, Oral, BID  budesonide, 0 5 mg, Nebulization, Q12H  cholecalciferol, 2,000 Units, Oral, Daily  formoterol, 20 mcg, Nebulization, Q12H  glycopyrrolate, 1 mg, Oral, TID  guaiFENesin, 600 mg, Oral, Q12H LIZETT  insulin glargine, 10 Units, Subcutaneous, HS  insulin lispro, 5 Units, Subcutaneous, TID With Meals  ipratropium, 0 5 mg, Nebulization, Q6H  levalbuterol, 1 25 mg, Nebulization, Q6H  lisinopril, 10 mg, Oral, Daily  methadone, 70 mg, Oral, Daily  micafungin (MYCAMINE) 100 mL IVPB, 100 mg, Intravenous, Q24H  multivitamin-minerals, 1 tablet, Oral, Daily  polyethylene glycol, 17 g, Oral, Daily      Continuous IV Infusions:     PRN Meds:  acetaminophen, 650 mg, Oral, Q4H PRN  albuterol, 2 puff, Inhalation, Q4H PRN  bisacodyl, 10 mg, Rectal, Daily PRN  Lidocaine Viscous HCl, 15 mL, Swish & Spit, 4x Daily PRN  menthol-methyl salicylate, , Apply externally, 4x Daily PRN  ondansetron, 4 mg, Intravenous, Q6H PRN  oxyCODONE, 2 5 mg, Oral, Q6H PRN        Discharge Plan: d    Network Utilization Review Department  ATTENTION: Please call with any questions or concerns to [phone] and carefully listen to the prompts so that you are directed to the right person  All voicemails are confidential   Belkis Smith all requests for admission clinical reviews, approved or denied determinations and any other requests to dedicated fax number below belonging to the campus where the patient is receiving treatment   List of dedicated fax numbers for the Facilities:  [address] DENIALS (Administrative/Medical Necessity) [phone]   [address] (Maternity/NICU/Pediatrics) [phone]   [address] [place] [address] Dr Malia Robles [address] ([facility] Epps "Cara" [address]) [address] [address] [address] [address] Roxanne Almeida [address] [place] [address] [phone]

## 2021-03-01 PROBLEM — L89.152 STAGE II PRESSURE ULCER OF SACRAL REGION (HCC): Status: ACTIVE | Noted: 2021-03-01

## 2021-03-01 PROBLEM — J86.9 EMPYEMA LUNG (HCC): Status: ACTIVE | Noted: 2021-03-01

## 2021-03-01 LAB
GLUCOSE SERPL-MCNC: 211 MG/DL (ref 65–140)
GLUCOSE SERPL-MCNC: 241 MG/DL (ref 65–140)
GLUCOSE SERPL-MCNC: 262 MG/DL (ref 65–140)
GLUCOSE SERPL-MCNC: 290 MG/DL (ref 65–140)

## 2021-03-01 PROCEDURE — 97163 PT EVAL HIGH COMPLEX 45 MIN: CPT

## 2021-03-01 PROCEDURE — 99232 SBSQ HOSP IP/OBS MODERATE 35: CPT | Performed by: PHYSICIAN ASSISTANT

## 2021-03-01 PROCEDURE — 94668 MNPJ CHEST WALL SBSQ: CPT

## 2021-03-01 PROCEDURE — 94760 N-INVAS EAR/PLS OXIMETRY 1: CPT

## 2021-03-01 PROCEDURE — 82948 REAGENT STRIP/BLOOD GLUCOSE: CPT

## 2021-03-01 PROCEDURE — 97167 OT EVAL HIGH COMPLEX 60 MIN: CPT

## 2021-03-01 PROCEDURE — 94640 AIRWAY INHALATION TREATMENT: CPT

## 2021-03-01 PROCEDURE — 94664 DEMO&/EVAL PT USE INHALER: CPT

## 2021-03-01 PROCEDURE — 99232 SBSQ HOSP IP/OBS MODERATE 35: CPT | Performed by: INTERNAL MEDICINE

## 2021-03-01 RX ORDER — INSULIN GLARGINE 100 [IU]/ML
15 INJECTION, SOLUTION SUBCUTANEOUS
Status: DISCONTINUED | OUTPATIENT
Start: 2021-03-01 | End: 2021-03-04 | Stop reason: HOSPADM

## 2021-03-01 RX ADMIN — LEVALBUTEROL HYDROCHLORIDE 1.25 MG: 1.25 SOLUTION, CONCENTRATE RESPIRATORY (INHALATION) at 19:03

## 2021-03-01 RX ADMIN — GUAIFENESIN 600 MG: 600 TABLET, EXTENDED RELEASE ORAL at 08:36

## 2021-03-01 RX ADMIN — IPRATROPIUM BROMIDE 0.5 MG: 0.5 SOLUTION RESPIRATORY (INHALATION) at 01:48

## 2021-03-01 RX ADMIN — LEVALBUTEROL HYDROCHLORIDE 1.25 MG: 1.25 SOLUTION, CONCENTRATE RESPIRATORY (INHALATION) at 01:48

## 2021-03-01 RX ADMIN — METHADONE HYDROCHLORIDE 70 MG: 10 TABLET ORAL at 08:35

## 2021-03-01 RX ADMIN — INSULIN GLARGINE 15 UNITS: 100 INJECTION, SOLUTION SUBCUTANEOUS at 21:13

## 2021-03-01 RX ADMIN — OXYCODONE HYDROCHLORIDE 2.5 MG: 5 TABLET ORAL at 09:09

## 2021-03-01 RX ADMIN — MICAFUNGIN SODIUM 100 MG: 50 INJECTION, POWDER, LYOPHILIZED, FOR SOLUTION INTRAVENOUS at 08:54

## 2021-03-01 RX ADMIN — LISINOPRIL 10 MG: 10 TABLET ORAL at 08:37

## 2021-03-01 RX ADMIN — IPRATROPIUM BROMIDE 0.5 MG: 0.5 SOLUTION RESPIRATORY (INHALATION) at 14:04

## 2021-03-01 RX ADMIN — GLYCOPYRROLATE 1 MG: 1 TABLET ORAL at 21:13

## 2021-03-01 RX ADMIN — FORMOTEROL FUMARATE DIHYDRATE 20 MCG: 20 SOLUTION RESPIRATORY (INHALATION) at 07:54

## 2021-03-01 RX ADMIN — OXYCODONE HYDROCHLORIDE 2.5 MG: 5 TABLET ORAL at 17:28

## 2021-03-01 RX ADMIN — APIXABAN 10 MG: 5 TABLET, FILM COATED ORAL at 17:25

## 2021-03-01 RX ADMIN — GLYCOPYRROLATE 1 MG: 1 TABLET ORAL at 17:25

## 2021-03-01 RX ADMIN — IPRATROPIUM BROMIDE 0.5 MG: 0.5 SOLUTION RESPIRATORY (INHALATION) at 07:53

## 2021-03-01 RX ADMIN — IPRATROPIUM BROMIDE 0.5 MG: 0.5 SOLUTION RESPIRATORY (INHALATION) at 19:03

## 2021-03-01 RX ADMIN — GLYCOPYRROLATE 1 MG: 1 TABLET ORAL at 08:42

## 2021-03-01 RX ADMIN — INSULIN LISPRO 5 UNITS: 100 INJECTION, SOLUTION INTRAVENOUS; SUBCUTANEOUS at 17:24

## 2021-03-01 RX ADMIN — GUAIFENESIN 600 MG: 600 TABLET, EXTENDED RELEASE ORAL at 21:13

## 2021-03-01 RX ADMIN — LEVALBUTEROL HYDROCHLORIDE 1.25 MG: 1.25 SOLUTION, CONCENTRATE RESPIRATORY (INHALATION) at 07:53

## 2021-03-01 RX ADMIN — INSULIN LISPRO 5 UNITS: 100 INJECTION, SOLUTION INTRAVENOUS; SUBCUTANEOUS at 07:07

## 2021-03-01 RX ADMIN — PIPERACILLIN AND TAZOBACTAM 4.5 G: 36; 4.5 INJECTION, POWDER, FOR SOLUTION INTRAVENOUS at 14:37

## 2021-03-01 RX ADMIN — LEVALBUTEROL HYDROCHLORIDE 1.25 MG: 1.25 SOLUTION, CONCENTRATE RESPIRATORY (INHALATION) at 14:03

## 2021-03-01 RX ADMIN — POLYETHYLENE GLYCOL 3350 17 G: 17 POWDER, FOR SOLUTION ORAL at 08:35

## 2021-03-01 RX ADMIN — BUDESONIDE 0.5 MG: 0.5 INHALANT ORAL at 19:03

## 2021-03-01 RX ADMIN — OXYCODONE HYDROCHLORIDE 2.5 MG: 5 TABLET ORAL at 00:05

## 2021-03-01 RX ADMIN — Medication 1 TABLET: at 08:36

## 2021-03-01 RX ADMIN — BUDESONIDE 0.5 MG: 0.5 INHALANT ORAL at 07:53

## 2021-03-01 RX ADMIN — PIPERACILLIN AND TAZOBACTAM 4.5 G: 36; 4.5 INJECTION, POWDER, FOR SOLUTION INTRAVENOUS at 19:28

## 2021-03-01 RX ADMIN — APIXABAN 10 MG: 5 TABLET, FILM COATED ORAL at 08:37

## 2021-03-01 RX ADMIN — Medication 2000 UNITS: at 08:37

## 2021-03-01 NOTE — ASSESSMENT & PLAN NOTE
· Improving as per CTA PE study 2/26  · S/p prior pigtail, has been removed  · Pulmonology following

## 2021-03-01 NOTE — PROGRESS NOTES
Progress Note - Pulmonary   Zackary De Leon 56 y o  male MRN: [id number]  Unit/Bed#: -01 Encounter: [id number]      Acute hypoxic respiratory failure secondary to aspiration vs hospital acquired pneumonia vs worsening fibrosis   - on 3lpm nasal cannula - wean for goal 92-93%   - c/w xopenex, atrovent  -  budesonide and perforomist started on 2/27   - mucinex po bid   - encourage mobility   - sputum cx pending- 3+ poly, 4+ gram neg rods  - add zosyn for empirical coverage  - add flutter valve   - CTA showed worsening bronchiectasis and honeycombing, with trace left pleural effusion and residual small left hydropneumothorax   - add PCT in AM   - given evidence of fibrosis on CTA, will discuss with case management regarding pirfenidone 801mg po tid - rx sent to outpatient pharmacy for pricing     Candida albicans empyema   - on micafungin through 3/12 as per ID     History of left hydropneumothorax ex vacuo  - s/p left 10F IR chest tube on 2/11 - removed by thoracics on 2/17   - thoracic surgery consulted last admission for trapped lung and decortication - no surgical intervention at present likely outpatient      Hx COVID-19 pneumonia  - initially diagnosed on 12/31/2020 and was asymptomatic  Returned on 01/11 with symptoms of SOB requiring intubation on 01/12/2021 and was extubated on 01/25/2021     History of tobacco abuse     Hepatitis C     Right lower extremity emboli s/p AKA    - on Eliquis     Subjective:   Patient seen/examined this AM   He feels fatigued and coughing but nothing is coming up  He has no fevers, chills, chest pain  Review of Systems   Constitutional: Positive for fatigue  Respiratory: Positive for cough and shortness of breath  All other systems reviewed and are negative        Objective:     Vitals:    02/28/21 2000 02/28/21 2135 03/01/21 0148 03/01/21 0804   BP:  123/73  130/73   Pulse:  94  86   Resp:       Temp:  98 9 °F (37 2 °C)  98 °F (36 7 °C)   TempSrc:       SpO2: 93% (!) 89% 95% 90%   Weight:       Height:               Intake/Output Summary (Last 24 hours) at 3/1/2021 5920  Last data filed at 3/1/2021 0801  Gross per 24 hour   Intake 938 ml   Output 1225 ml   Net -287 ml         Physical Exam  Constitutional:       Comments: thin   HENT:      Head: Normocephalic  Eyes:      Pupils: Pupils are equal, round, and reactive to light  Neck:      Musculoskeletal: Normal range of motion  Cardiovascular:      Rate and Rhythm: Normal rate and regular rhythm  Pulses: Normal pulses  Pulmonary:      Breath sounds: Rhonchi and rales present  Abdominal:      General: Abdomen is flat  Palpations: Abdomen is soft  Musculoskeletal:      Comments: R AKA    Skin:     General: Skin is warm and dry  Neurological:      General: No focal deficit present  Mental Status: He is alert and oriented to person, place, and time  Labs: I have personally reviewed pertinent lab results    Results from last 7 days   Lab Units 02/27/21  0612 02/26/21  1628 02/25/21  0600   WBC Thousand/uL 10 46* 20 01* 11 03*   HEMOGLOBIN g/dL 8 1* 9 2* 9 3*   HEMATOCRIT % 26 9* 30 3* 31 4*   PLATELETS Thousands/uL 272 393* 388   NEUTROS PCT %  --  95* 82*   MONOS PCT %  --  1* 5      Results from last 7 days   Lab Units 02/27/21  0612 02/26/21  1628 02/25/21  0600   POTASSIUM mmol/L 3 8 4 8 3 0*   CHLORIDE mmol/L 108 102 112*   CO2 mmol/L 26 28 26   BUN mg/dL 15 17 13   CREATININE mg/dL 0 44* 0 64 0 33*   CALCIUM mg/dL 8 6 8 7 7 4*   ALK PHOS U/L  --  114 96   ALT U/L  --  8* 8*   AST U/L  --  11 12              Results from last 7 days   Lab Units 02/26/21  1628   INR  2 77*   PTT seconds 62*         0   Lab Value Date/Time    TROPONINI <0 02 02/13/2021 2036    TROPONINI <0 03 01/11/2021 1020    TROPONINI <0 03 01/06/2021 0557    TROPONINI <0 03 12/31/2020 1955    TROPONINI <0 02 05/21/2020 1517    TROPONINI <0 02 05/21/2020 1228         Imaging and other studies: I have personally reviewed pertinent reports     and I have personally reviewed pertinent films in PACS        Orie Meigs, MD   Pulmonary & Critical Care Fellow  [facility]

## 2021-03-01 NOTE — RESTORATIVE TECHNICIAN NOTE
Restorative Specialist Mobility Note       Activity: Chair, Dangle, Stand at bedside(Educated/encouraged pt to get In/OOB to the chair with assistance  Bed alarm on   Pt callbell, phone/tray within reach )     Assistive Device: Other (Comment)(Assist x2 stand-pivot back to bed)       Gina GUALLPA, Restorative Technician, [employer]

## 2021-03-01 NOTE — ASSESSMENT & PLAN NOTE
· On chronic methadone 70 mg daily Continue TPN. EHM 1 ml hr continuous feeds per Surgery recommendation + TPN    Check bili total and direct today 1. UA/UV placed with starter TPN to give 105 ml/kg/dy 1. UA/UV placed with starter TPN to give 105 ml/kg/dy 1. UA/UV placed with starter TPN to give 105 ml/kg/dy Continue TPN. EHM 1 ml hr continuous feeds per Surgery recommendation + TPN    Check bili total and direct today

## 2021-03-01 NOTE — PLAN OF CARE
Problem: Potential for Falls  Goal: Patient will remain free of falls  Description: INTERVENTIONS:  - Assess patient frequently for physical needs  -  Identify cognitive and physical deficits and behaviors that affect risk of falls    -  [place] fall precautions as indicated by assessment   - Educate patient/family on patient safety including physical limitations  - Instruct patient to call for assistance with activity based on assessment  - Modify environment to reduce risk of injury  - Consider OT/PT consult to assist with strengthening/mobility  Outcome: Progressing     Problem: Prexisting or High Potential for Compromised Skin Integrity  Goal: Skin integrity is maintained or improved  Description: INTERVENTIONS:  - Identify patients at risk for skin breakdown  - Assess and monitor skin integrity  - Assess and monitor nutrition and hydration status  - Monitor labs   - Assess for incontinence   - Turn and reposition patient  - Assist with mobility/ambulation  - Relieve pressure over bony prominences  - Avoid friction and shearing  - Provide appropriate hygiene as needed including keeping skin clean and dry  - Evaluate need for skin moisturizer/barrier cream  - Collaborate with interdisciplinary team   - Patient/family teaching  - Consider wound care consult   Outcome: Progressing     Problem: PAIN - ADULT  Goal: Verbalizes/displays adequate comfort level or baseline comfort level  Description: Interventions:  - Encourage patient to monitor pain and request assistance  - Assess pain using appropriate pain scale  - Administer analgesics based on type and severity of pain and evaluate response  - Implement non-pharmacological measures as appropriate and evaluate response  - Consider cultural and social influences on pain and pain management  - Notify physician/advanced practitioner if interventions unsuccessful or patient reports new pain  Outcome: Progressing     Problem: INFECTION - ADULT  Goal: Absence or prevention of progression during hospitalization  Description: INTERVENTIONS:  - Assess and monitor for signs and symptoms of infection  - Monitor lab/diagnostic results  - Monitor all insertion sites, i e  indwelling lines, tubes, and drains  - Monitor endotracheal if appropriate and nasal secretions for changes in amount and color  - [place] appropriate cooling/warming therapies per order  - Administer medications as ordered  - Instruct and encourage patient and family to use good hand hygiene technique  - Identify and instruct in appropriate isolation precautions for identified infection/condition  Outcome: Progressing     Problem: SAFETY ADULT  Goal: Patient will remain free of falls  Description: INTERVENTIONS:  - Assess patient frequently for physical needs  -  Identify cognitive and physical deficits and behaviors that affect risk of falls    -  [place] fall precautions as indicated by assessment   - Educate patient/family on patient safety including physical limitations  - Instruct patient to call for assistance with activity based on assessment  - Modify environment to reduce risk of injury  - Consider OT/PT consult to assist with strengthening/mobility  Outcome: Progressing  Goal: Maintain or return to baseline ADL function  Description: INTERVENTIONS:  -  Assess patient's ability to carry out ADLs; assess patient's baseline for ADL function and identify physical deficits which impact ability to perform ADLs (bathing, care of mouth/teeth, toileting, grooming, dressing, etc )  - Assess/evaluate cause of self-care deficits   - Assess range of motion  - Assess patient's mobility; develop plan if impaired  - Assess patient's need for assistive devices and provide as appropriate  - Encourage maximum independence but intervene and supervise when necessary  - Involve family in performance of ADLs  - Assess for home care needs following discharge   - Consider OT consult to assist with ADL evaluation and planning for discharge  - Provide patient education as appropriate  Outcome: Progressing  Goal: Maintain or return mobility status to optimal level  Description: INTERVENTIONS:  - Assess patient's baseline mobility status (ambulation, transfers, stairs, etc )    - Identify cognitive and physical deficits and behaviors that affect mobility  - Identify mobility aids required to assist with transfers and/or ambulation (gait belt, sit-to-stand, lift, walker, cane, etc )  - [place] fall precautions as indicated by assessment  - Record patient progress and toleration of activity level on Mobility SBAR; progress patient to next Phase/Stage  - Instruct patient to call for assistance with activity based on assessment  - Consider rehabilitation consult to assist with strengthening/weightbearing, etc   Outcome: Progressing     Problem: DISCHARGE PLANNING  Goal: Discharge to home or other facility with appropriate resources  Description: INTERVENTIONS:  - Identify barriers to discharge w/patient and caregiver  - Arrange for needed discharge resources and transportation as appropriate  - Identify discharge learning needs (meds, wound care, etc )  - Arrange for interpretive services to assist at discharge as needed  - Refer to Case Management Department for coordinating discharge planning if the patient needs post-hospital services based on physician/advanced practitioner order or complex needs related to functional status, cognitive ability, or social support system  Outcome: Progressing     Problem: Knowledge Deficit  Goal: Patient/family/caregiver demonstrates understanding of disease process, treatment plan, medications, and discharge instructions  Description: Complete learning assessment and assess knowledge base    Interventions:  - Provide teaching at level of understanding  - Provide teaching via preferred learning methods  Outcome: Progressing     Problem: Nutrition/Hydration-ADULT  Goal: Nutrient/Hydration intake appropriate for improving, restoring or maintaining nutritional needs  Description: Monitor and assess patient's nutrition/hydration status for malnutrition  Collaborate with interdisciplinary team and initiate plan and interventions as ordered  Monitor patient's weight and dietary intake as ordered or per policy  Utilize nutrition screening tool and intervene as necessary  Determine patient's food preferences and provide high-protein, high-caloric foods as appropriate       INTERVENTIONS:  - Monitor oral intake, urinary output, labs, and treatment plans  - Assess nutrition and hydration status and recommend course of action  - Evaluate amount of meals eaten  - Assist patient with eating if necessary   - Allow adequate time for meals  - Recommend/ encourage appropriate diets, oral nutritional supplements, and vitamin/mineral supplements  - Order, calculate, and assess calorie counts as needed  - Recommend, monitor, and adjust tube feedings and TPN/PPN based on assessed needs  - Assess need for intravenous fluids  - Provide specific nutrition/hydration education as appropriate  - Include patient/family/caregiver in decisions related to nutrition  Outcome: Progressing     Problem: RESPIRATORY - ADULT  Goal: Achieves optimal ventilation and oxygenation  Description: INTERVENTIONS:  - Assess for changes in respiratory status  - Assess for changes in mentation and behavior  - Position to facilitate oxygenation and minimize respiratory effort  - Oxygen administered by appropriate delivery if ordered  - Initiate smoking cessation education as indicated  - Encourage broncho-pulmonary hygiene including cough, deep breathe, Incentive Spirometry  - Assess the need for suctioning and aspirate as needed  - Assess and instruct to report SOB or any respiratory difficulty  - Respiratory Therapy support as indicated  Outcome: Progressing     Problem: SKIN/TISSUE INTEGRITY - ADULT  Goal: Skin integrity remains intact  Description: INTERVENTIONS  - Identify patients at risk for skin breakdown  - Assess and monitor skin integrity  - Assess and monitor nutrition and hydration status  - Monitor labs (i e  albumin)  - Assess for incontinence   - Turn and reposition patient  - Assist with mobility/ambulation  - Relieve pressure over bony prominences  - Avoid friction and shearing  - Provide appropriate hygiene as needed including keeping skin clean and dry  - Evaluate need for skin moisturizer/barrier cream  - Collaborate with interdisciplinary team (i e  Nutrition, Rehabilitation, etc )   - Patient/family teaching  Outcome: Progressing  Goal: Incision(s), wounds(s) or drain site(s) healing without S/S of infection  Description: INTERVENTIONS  - Assess and document risk factors for skin impairment   - Assess and document dressing, incision, wound bed, drain sites and surrounding tissue  - Consider nutrition services referral as needed  - Oral mucous membranes remain intact  - Provide patient/ family education  Outcome: Progressing     Problem: MUSCULOSKELETAL - ADULT  Goal: Maintain or return mobility to safest level of function  Description: INTERVENTIONS:  - Assess patient's ability to carry out ADLs; assess patient's baseline for ADL function and identify physical deficits which impact ability to perform ADLs (bathing, care of mouth/teeth, toileting, grooming, dressing, etc )  - Assess/evaluate cause of self-care deficits   - Assess range of motion  - Assess patient's mobility  - Assess patient's need for assistive devices and provide as appropriate  - Encourage maximum independence but intervene and supervise when necessary  - Involve family in performance of ADLs  - Assess for home care needs following discharge   - Consider OT consult to assist with ADL evaluation and planning for discharge  - Provide patient education as appropriate  Outcome: Progressing  Goal: Maintain proper alignment of affected body part  Description: INTERVENTIONS:  - Support, maintain and protect limb and body alignment  - Provide patient/ family with appropriate education  Outcome: Progressing

## 2021-03-01 NOTE — PLAN OF CARE
Problem: PHYSICAL THERAPY ADULT  Goal: Performs mobility at highest level of function for planned discharge setting  See evaluation for individualized goals  Description: Treatment/Interventions: Functional transfer training, LE strengthening/ROM, Therapeutic exercise, Endurance training, Bed mobility          See flowsheet documentation for full assessment, interventions and recommendations  Note: Prognosis: Good  Problem List: Decreased strength, Decreased endurance, Impaired balance, Decreased mobility, Decreased cognition  Assessment: Pt is a 55 yo male admitted to [place] [address] on 2/26/2021 s/p SOB and increased oxygen requirement  Pt admitted from rehab  DX: acute respiratory failure with hypoxia, hypoxia, L hydropneumothorax, ischemia of R LE with suspected rhabdomyolysis, stage II pressure ulcer on sacrum, R femoral vein DVT, DM  Two patient identifiers were used to confirm  Pt lives in a [facility] with 3 FRANDY  Pt lives with her brother and sister  Pt's bed and bath are on the second floor  Pt did not use any DME prior  Pt works full time and drives  Pt's impairments include reduced mobility, high risk of falling, R wrist reduced extension and reduced grasp, Reduced DF on L LE and strength  These impairments limit the ability of the patient to perform mobility without increased assistance, return to PLOF and participate in everyday life activities  Pt would benefit from continued skilled therapy while in the hospital to improve overall mobility and work towards a safe d/c  Recommend discharge to rehab  At the end of the session the patient was left in seated position with call bell and phone within reach  The patient's AM-PAC Basic Mobility Inpatient Short Form Low Function Raw Score 14 , Standardized Score is 22 01  A standardized score less 42 9 suggests the patient may benefit from discharge to post-acute rehab services   Please also refer to the recommendation of the Physical Therapist for safe discharge planning  Pt SpO2 dropped to 80% on RA  Barriers to Discharge: Inaccessible home environment, Decreased caregiver support     PT Discharge Recommendation: [id number]     PT - OK to Discharge: Yes    See flowsheet documentation for full assessment

## 2021-03-01 NOTE — ASSESSMENT & PLAN NOTE
· Left-sided candidal empyema  · Per ID:  · Continue micafungin through 03/12/2021  · May eventually need decortication  · Recheck CBC with diff and CMP at least weekly while on treatment

## 2021-03-01 NOTE — CASE MANAGEMENT
Pulmonology inquired a price check for medication pirfenidone  CM called [facility]-needs prior auth, unable to fill  [facility] states it will likely have to be filled at a specialty pharmacy  CM called MIRIAM Flores ([phone]) whom states she will return call to [facility] tomorrow if they are able to fill it at their location

## 2021-03-01 NOTE — PLAN OF CARE
Problem: OCCUPATIONAL THERAPY ADULT  Goal: Performs self-care activities at highest level of function for planned discharge setting  See evaluation for individualized goals  Note: Limitation: Decreased ADL status, Decreased UE strength, Decreased Safe judgement during ADL, Decreased cognition, Decreased endurance, Decreased sensation, Decreased self-care trans, Decreased high-level ADLs     Assessment: Pt is a 56 y o  male seen for OT evaluation s/p admit to [facility] on 2/26/2021 w/ Acute respiratory failure with hypoxia ([facility] [address] )  Pt recently dc'd from [facility] to acute rehab (2/25) after prolonged hospitalization, including COVID 19 and R AKA  Comorbidities affecting pt's functional performance at time of assessment include: DM and methadone maintenance therapy, ischemica RLE, R AKA, hypoxia, stage II pressure ulcer sacral region, chronic hep C, Anemia, acute pancreatitis, aortic thrombus, dysphagia, tobacco abuse  Personal factors affecting pt at time of IE include:steps to enter environment, limited home support, difficulty performing ADLS, difficulty performing IADLS , limited insight into deficits and decreased initiation and engagement   Prior to admission, pt was Independent w self care, working, driving etc  Since hospitalization early January, pt has been decompensated and needing assist w all self care, mobility  Upon evaluation: Pt requires max assist of 2 for bedmobility, sit pivot transfer, max/total assist for self care 2* the following deficits impacting occupational performance: weakness, decreased strength, decreased balance, decreased tolerance, impaired [facility], impaired [facility], impaired attention, impaired memory, impaired sequencing, impaired problem solving and decreased safety awareness  Pt also noted with R wrist drop    Pt to benefit from continued skilled OT tx while in the hospital to address deficits as defined above and maximize level of functional independence w ADL's and functional mobility  Occupational Performance areas to address include: eating, grooming, bathing/shower, toilet hygiene, dressing, functional mobility and clothing management  Pt scored   25/100 on the barthel index  Based on findings from OT evaluation and functional performance deficits, pt has been identified as a High complexity evaluation  From OT standpoint, recommendation at time of d/c would be STR as patient is currently functioning below baseline  OT Discharge Recommendation: Post-Acute Rehabilitation Services  OT - OK to Discharge:  Yes

## 2021-03-01 NOTE — OCCUPATIONAL THERAPY NOTE
Occupational Therapy Evaluation      Swetha Agarwal    3/1/2021    Principal Problem:    Acute respiratory failure with hypoxia ([facility] 75 )  Active Problems:    Methadone maintenance therapy patient ([place] [address] )    Type 2 diabetes mellitus, with long-term current use of insulin (HCC)    Ischemia of right lower extremity with suspected rhabdomyolysis    Right femoral vein DVT (HCC)    Left Hydropneumothorax    Hypoxia    Empyema lung (HCC)    Stage II pressure ulcer of sacral region [facility])      Past Medical History:   Diagnosis Date    Diabetes mellitus ([place] [address] )     GERD (gastroesophageal reflux disease)     Hypercholesteremia     Hypertension     Methadone use ([place] [address] )        Past Surgical History:   Procedure Laterality Date    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    Procedure: AMPUTATION ABOVE KNEE (AKA); Surgeon: Lena Torres MD;  [facility];  Service: Vascular    AMPUTATION ABOVE KNEE (AKA) Right 1/18/2021    Procedure: AMPUTATION ABOVE KNEE (AKA) FORMALIZATION,  R AKA wound washout, wound closure;  Surgeon: Lena Torres MD;  [facility];  Service: Vascular    ARTERIOGRAM Right 1/13/2021    Procedure: ARTERIOGRAM;  Surgeon: Emre Harper DO;  [facility];  Service: Vascular    IR CHEST TUBE PLACEMENT  2/10/2021    IR LOWER EXTREMITY ANGIOGRAM  1/14/2021    THROMBECTOMY W/ EMBOLECTOMY Right 1/13/2021    Procedure: EMBOLECTOMY/THROMBECTOMY LOWER EXTREMITY;  Surgeon: Emre Harper DO;  [facility];  Service: Vascular        03/01/21 1010   OT Last Visit   OT Visit Date 03/01/21   Note Type   Note type Evaluation   Restrictions/Precautions   Weight Bearing Precautions Per Order Yes   RLE Weight Bearing Per Order NWB  (R AKA Jan 14,2021)   Other Precautions Pain; Fall Risk;O2;Multiple lines;Telemetry; Chair Alarm; Bed Alarm   Pain Assessment   Pain Assessment Tool 0-10   Pain Score 3   Pain Location/Orientation Orientation: Right;Location: Incision   Home Living   Type of [place] 25 Layout Multi-level   Bathroom Shower/Tub Tub/shower unit   Bathroom Toilet Standard   Additional Comments pt normally lives at home in a ML home w siblings/nephews  currently at rehab center s/p last hospitalization   Prior Function   Level of [place] Independent with ADLs and functional mobility   Lives With [facility] Help From Family   ADL Assistance Independent   IADLs Independent   Vocational Full time employment   Comments +   Lifestyle   Autonomy pt has not been home since early [place] when he was admitted to acute care with COVID 19, requiring intubation, had a R AKA  from acute care he was dc'd on 2/25 to acute rehab  only there for one day, back to acute care with hypoxia   Reciprocal Relationships supportive family   Subjective   Subjective "I don't know what happened to my hand"   ADL   Eating Assistance 5  Supervision/Setup   Eating Deficit Setup; Increased time to complete   Grooming Assistance 4  Minimal Assistance   Grooming Deficit Setup; Increased time to complete;Wash/dry hands; Wash/dry face   UB Bathing Assistance 3  Moderate Assistance   UB Bathing Deficit Increased time to complete; Chest;Right arm;Left arm; Abdomen;Perineal area   LB Bathing Assistance 1  Total Assistance   LB Bathing Deficit Increased time to complete   UB Dressing Assistance 3  Moderate Assistance   UB Dressing Deficit Increased time to complete   LB Dressing Assistance 1  Total Assistance   Toileting Assistance  1  Total Assistance   Bed Mobility   Supine to Sit 2  Maximal assistance   Additional items Assist x 2; Increased time required   Transfers   Sit to Stand Unable to assess   Sit pivot 2  Maximal assistance   Additional items Assist x 2; Increased time required   Balance   Static Sitting Fair   Dynamic Sitting Fair -   Static Standing Zero   Activity Tolerance   Activity Tolerance Patient limited by fatigue   Medical Staff Made Aware OK to see per RN, PT Teresa Martino Assessment RUE Assessment X   Edema   RUE Edema   (PT WITH WRIST DROP, NEW PER PATIENT, WEAK )   LUE Assessment   LUE Assessment WFL   Hand Function   Gross Motor Coordination Impaired  (R UE)   Fine Motor Coordination Impaired  (R HAND)   Sensation   Additional Comments C/O tingling R hand   Vision - Complex Assessment   Tracking Able to track stimulus in all quads without difficulty   Perception   Inattention/Neglect Appears intact   Cognition   Overall Cognitive Status Impaired   Arousal/Participation Alert; Cooperative   Attention Attends with cues to redirect   Orientation Level Oriented to person;Oriented to place;Oriented to situation   Memory Decreased recall of precautions;Decreased recall of recent events;Decreased short term memory   Following Commands Follows one step commands with increased time or repetition   Comments pt with decreased insight, some confusion, decreased prob solving   Assessment   Limitation Decreased ADL status; Decreased UE strength;Decreased Safe judgement during ADL;Decreased cognition;Decreased endurance;Decreased sensation;Decreased self-care trans;Decreased high-level ADLs   Assessment Pt is a 56 y o  male seen for OT evaluation s/p admit to [facility] on 2/26/2021 w/ Acute respiratory failure with hypoxia ([facility] )  Pt recently dc'd from [facility] to acute rehab (2/25) after prolonged hospitalization, including COVID 19 and R AKA  Comorbidities affecting pt's functional performance at time of assessment include: DM and methadone maintenance therapy, ischemica RLE, R AKA, hypoxia, stage II pressure ulcer sacral region, chronic hep C, Anemia, acute pancreatitis, aortic thrombus, dysphagia, tobacco abuse  Personal factors affecting pt at time of IE include:steps to enter environment, limited home support, difficulty performing ADLS, difficulty performing IADLS , limited insight into deficits and decreased initiation and engagement    Prior to admission, pt was Independent w self care, working, driving etc  Since hospitalization early January, pt has been decompensated and needing assist w all self care, mobility  Upon evaluation: Pt requires max assist of 2 for bedmobility, sit pivot transfer, max/total assist for self care 2* the following deficits impacting occupational performance: weakness, decreased strength, decreased balance, decreased tolerance, impaired G[facility], impaired [facility], impaired attention, impaired memory, impaired sequencing, impaired problem solving and decreased safety awareness  Pt also noted with R wrist drop  Pt to benefit from continued skilled OT tx while in the hospital to address deficits as defined above and maximize level of functional independence w ADL's and functional mobility  Occupational Performance areas to address include: eating, grooming, bathing/shower, toilet hygiene, dressing, functional mobility and clothing management  Pt scored   25/100 on the barthel index  Based on findings from OT evaluation and functional performance deficits, pt has been identified as a High complexity evaluation  From OT standpoint, recommendation at time of d/c would be STR as patient is currently functioning below baseline  Goals   Patient Goals to get better   Long Term Goal #1 see below   Plan   Treatment Interventions ADL retraining;Functional transfer training;UE strengthening/ROM; Endurance training;Cognitive reorientation;Patient/family training;Equipment evaluation/education; Neuromuscular reeducation; Fine motor coordination activities; Compensatory technique education;UE splinting; Energy conservation; Activityengagement   Goal Expiration Date 03/15/21   OT Frequency 3-5x/wk   Recommendation   OT Discharge Recommendation Post-Acute Rehabilitation Services   OT - OK to Discharge Yes   AM-PAC Daily Activity Inpatient   Lower Body Dressing 1   Bathing 2   Toileting 2   Upper Body Dressing 3   Grooming 3   Eating 3   Daily Activity Raw Score 14   Daily Activity Standardized Score (Calc for Raw Score >=11) 33 39   Barthel Index   Feeding 5   Bathing 0   Grooming Score 0   Dressing Score 0   Bladder Score 5   Bowels Score 10   Toilet Use Score 0   Transfers (Bed/Chair) Score 5   Mobility (Level Surface) Score 0   Stairs Score 0   Barthel Index Score 25     OT GOALS TO BE ACHIEVED BY 3/15/21    Patient will complete bed mobility mod I  With good safety     Pt will demonstrate good balance sitting at EOB x 10 min for increased safety w self care and in preparation for increased indpendence    Pt will complete grooming independently with set up     Pt will complete UB bathing and dressing INDEPENDENTLY     Pt will complete LB bathing and dressing W mod assist    Pt will complete toileting w mod Assist and good safety     Pt will tolerate standing x 2 min w use of RW and support/assist of 1-2  increased safety w hygiene and in preparation for SPT    Pt will complete sliding board transfers w mod assist of 1 w good safety     Pt will tolerate AROM exercises both UE/increase overall strength to [facility], increase functional Use of RUE  Assess for need for cockup writs splint R hand to aide with R wrist drop    Pt will participate in ongoing cognitive assessment and training as appropriate to assist w safest dc plan

## 2021-03-01 NOTE — ASSESSMENT & PLAN NOTE
Lab Results   Component Value Date    HGBA1C 5 3 02/26/2021       Recent Labs     02/28/21  1553 02/28/21  2125 03/01/21  0013 03/01/21  0512   POCGLU 199* 229* 262* 211*       Blood Sugar Average: Last 72 hrs:  · (P) 247 [id number]       · Continue Lantus, and Humalog t i d   With meals  · Increased Lantus to 15 units qhs given persistent hyperglycemia   · Titrate insulin dose based on Accu-Cheks  · Avoid hypoglycemia  · Hypoglycemia protocol in place

## 2021-03-01 NOTE — PHYSICAL THERAPY NOTE
Physical Therapy Evaluation Note     Patient Name: Ivonne ZELAYAP Date: 3/1/2021     Problem List  Principal Problem:    Acute respiratory failure with hypoxia ([facility] )  Active Problems:    Methadone maintenance therapy patient ([place] [address] )    Type 2 diabetes mellitus, with long-term current use of insulin (HCC)    Ischemia of right lower extremity with suspected rhabdomyolysis    Right femoral vein DVT (HCC)    Left Hydropneumothorax    Hypoxia    Empyema lung (HCC)    Stage II pressure ulcer of sacral region [facility])       Past Medical History  Past Medical History:   Diagnosis Date    Diabetes mellitus ([place] [address] )     GERD (gastroesophageal reflux disease)     Hypercholesteremia     Hypertension     Methadone use ([place] [address] )         Past Surgical History  Past Surgical History:   Procedure Laterality Date    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    Procedure: AMPUTATION ABOVE KNEE (AKA);   Surgeon: Noelle Alberto MD;  [facility];  Service: Vascular    AMPUTATION ABOVE KNEE (AKA) Right 1/18/2021    Procedure: AMPUTATION ABOVE KNEE (AKA) FORMALIZATION,  R AKA wound washout, wound closure;  Surgeon: Noelle Alberto MD;  [facility];  Service: Vascular    ARTERIOGRAM Right 1/13/2021    Procedure: ARTERIOGRAM;  Surgeon: Yvonne Stroud DO;  [facility];  Service: Vascular    IR CHEST TUBE PLACEMENT  2/10/2021    IR LOWER EXTREMITY ANGIOGRAM  1/14/2021    THROMBECTOMY W/ EMBOLECTOMY Right 1/13/2021    Procedure: EMBOLECTOMY/THROMBECTOMY LOWER EXTREMITY;  Surgeon: Yvonne Stroud DO;  [facility];  Service: Vascular         03/01/21 1009   PT Last Visit   PT Visit Date 03/01/21   Note Type   Note type Evaluation   Pain Assessment   Pain Assessment Tool Pain Assessment not indicated - pt denies pain   Pain Score No Pain   Home Living   Type of Home House   Home Layout Multi-level   Additional Comments Pt was admitted from rehab  Prior to his rehab stay patient lived in a [place] with 3STE  Pt's bed and bath are on the second floor  Pt was I for ADL's and mobility prior  Pt worked full time and drives  PT does not own any DME  Prior Function   Level of [place] Independent with ADLs and functional mobility   Lives With Other (Comment)  (His brother and sister )   Krissy Mercado Help From Family   ADL Assistance Independent   IADLs Independent   Vocational Full time employment   Comments +drives    Restrictions/Precautions   Wells Seth Bearing Precautions Per Order Yes   RLE Weight Bearing Per Order NWB  (AKA)   Other Precautions Fall Risk;Multiple lines; Chair Alarm; Bed Alarm   General   Family/Caregiver Present No   Cognition   Overall Cognitive Status Impaired   Arousal/Participation Alert   Orientation Level Oriented to person;Oriented to place;Oriented to situation   RUE Assessment   RUE Assessment   (reduced wrist extension and reduced pincher grasp)   LLE Assessment   LLE Assessment X   Strength LLE   L Hip Flexion 3-/5   L Knee Extension 3-/5   L Ankle Dorsiflexion 1/5   Coordination   Sensation WFL   Light Touch   RLE Light Touch Grossly intact   LLE Light Touch Grossly intact   Bed Mobility   Supine to Sit 2  Maximal assistance   Additional items Assist x 2   Additional Comments sitting EOB at a min A    Transfers   Sit to Stand Unable to assess   Additional Comments scotting to chair with B UE at a max A x2   Balance   Static Sitting Fair   Dynamic Sitting Fair -   Static Standing Zero   Endurance Deficit   Endurance Deficit Yes   Activity Tolerance   Activity Tolerance Patient limited by fatigue   Medical Staff Made Aware OT   Nurse Made Aware nurse approved therapy session   Assessment   Prognosis Good   Problem List Decreased strength;Decreased endurance; Impaired balance;Decreased mobility; Decreased cognition   Assessment Pt is a 57 yo male admitted to [place] [id number] on 2/26/2021 s/p SOB and increased oxygen requirement   Pt admitted from rehab  DX: acute respiratory failure with hypoxia, hypoxia, L hydropneumothorax, ischemia of R LE with suspected rhabdomyolysis, stage II pressure ulcer on sacrum, R femoral vein DVT, DM  Two patient identifiers were used to confirm  Pt lives in a [facility] with 3 FRANDY  Pt lives with her brother and sister  Pt's bed and bath are on the second floor  Pt did not use any DME prior  Pt works full time and drives  Pt's impairments include reduced mobility, high risk of falling, R wrist reduced extension and reduced grasp, Reduced DF on L LE and strength, reduced activity tolerance  These impairments limit the ability of the patient to perform mobility without increased assistance, return to PLOF and participate in everyday life activities  Pt would benefit from continued skilled therapy while in the hospital to improve overall mobility and work towards a safe d/c  Recommend discharge to rehab  At the end of the session the patient was left in seated position with call bell and phone within reach  The patient's AM-PAC Basic Mobility Inpatient Short Form Low Function Raw Score 14 , Standardized Score is 22 01  A standardized score less 42 9 suggests the patient may benefit from discharge to post-acute rehab services  Please also refer to the recommendation of the Physical Therapist for safe discharge planning  Pt SpO2 dropped to 80% on RA  Barriers to Discharge Inaccessible home environment;Decreased caregiver support   Goals   STG Expiration Date 03/15/21   Short Term Goal #1 STG 1: Pt will perform transfers at a min A level to return to baseline of function  STG 2: Pt will perform bed mobility at a min A to safety return to PLOF  STG 3: Pt will perform later scooting with beasy or transfer board at a min A level to improve mobility and reduce the level of assistance required upon d c  STG 4: Pt will sit EOB at a I level with performing dynamic and static balance to improve sitting tolerance balance   STG 5: PT will perform w/c mobility at a MI level to improve mobility on level and uneven sean for 300ft  PT Treatment Day 0   Plan   Treatment/Interventions Functional transfer training;LE strengthening/ROM; Therapeutic exercise; Endurance training;Bed mobility   PT Frequency Other (Comment)  (3-5xwk)   Recommendation   PT Discharge Recommendation Post-Acute Rehabilitation Services   PT - OK to Discharge Yes   Additional Comments if torehab   AM-PAC Basic Mobility Inpatient   Turning in Bed Without Bedrails 1   Lying on Back to Sitting on Edge of Flat Bed 1   Moving Bed to Chair 1   Standing Up From Chair 1   Walk in Room 1   Climb 3-5 Stairs 1   Basic Mobility Inpatient Raw Score 6   Turning Head Towards Sound 4   Follow Simple Instructions 4   Low Function Basic Mobility Raw Score 14   Low Function Basic Mobility Standardized Score 22 01   Naseem Duke, Pt, DPT

## 2021-03-01 NOTE — ASSESSMENT & PLAN NOTE
· With previous complicated [id number] case requiring intubation, additionally with empyema on micafungin  · Presented from rehab with reported hypoxia with oxygen requirement and increased work of breathing, now improving  · CTA PE study negative for pulmonary embolism, and with improving known empyema; persistent changes noted  · Per pulmonology continue supplemental oxygen as needed, wean for goal of O2 sat 92-93%  · Incentive spirometry, pulmonary toileting

## 2021-03-01 NOTE — ASSESSMENT & PLAN NOTE
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

## 2021-03-01 NOTE — PROGRESS NOTES
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  · Sputum culture with gram negative rods, started on IV Zosyn for coverage of HCAP vs aspiration   · Pulm attempting to start anti fibrotic   · Continue supplemental oxygen  · Wean as tolerated to keep O2 sats 92-93%  · Pulmonary following  · Pulmonary input noted  · Currently on room air with stable O2 sats    Stage II pressure ulcer of sacral region [facility])  Assessment & Plan  · Encourage frequent repositioning  · Local wound care    Empyema lung ([facility] [address] )  Assessment & Plan  · Left-sided candidal empyema  · Per ID:  · Continue micafungin through 03/12/2021  · May eventually need decortication  · Recheck CBC with diff and CMP at least weekly while on treatment    Hypoxia  Assessment & Plan  · With previous complicated [id number] case requiring intubation, additionally with empyema on micafungin  · Presented from rehab with reported hypoxia with oxygen requirement and increased work of breathing, now improving  · CTA PE study negative for pulmonary embolism, and with improving known empyema; persistent changes noted  · Per pulmonology continue supplemental oxygen as needed, wean for goal of O2 sat 92-93%  · Incentive spirometry, pulmonary toileting    Left Hydropneumothorax  Assessment & Plan  · Improving as per CTA PE study 2/26  · S/p prior pigtail, has been removed  · Pulmonology following    Right femoral vein DVT ([facility] [address] )  Assessment & Plan  · Continue Eliquis    Ischemia of right lower extremity with suspected rhabdomyolysis  Assessment & Plan  · S/p emergent right AKA   · Continue Eliquis - transition from 10 mg BID to 5 mg BID tomorrow, wound care    Type 2 diabetes mellitus, with long-term current use of insulin [facility])  Assessment & Plan  Lab Results   Component Value Date    HGBA1C 5 3 2021       Recent Labs     21  1553 21  2125 21  0013 21  0512   POCGLU 199* 229* 262* 211*       Blood Sugar Average: Last 72 hrs:  · (P) 247 [id number]       · Continue Lantus, and Humalog t i d  With meals  · Increased Lantus to 15 units qhs given persistent hyperglycemia   · Titrate insulin dose based on Accu-Cheks  · Avoid hypoglycemia  · Hypoglycemia protocol in place    Methadone maintenance therapy patient [facility])  Assessment & Plan  · On chronic methadone 70 mg daily      VTE Pharmacologic Prophylaxis:   Pharmacologic: Apixaban (Eliquis)  Mechanical VTE Prophylaxis in Place: No    Patient Centered Rounds: I have performed bedside rounds with nursing staff today  Discussions with Specialists or Other Care Team Provider:     Education and Discussions with Family / Patient: patient, declined call to family    Time Spent for Care: 20 minutes  More than 50% of total time spent on counseling and coordination of care as described above  Current Length of Stay: 1 day(s)    Current Patient Status: Inpatient   Certification Statement: The patient will continue to require additional inpatient hospital stay due to IV abx    Discharge Plan: when cleared by pulm    Code Status: Level 1 - Full Code      Subjective: The patient expressed frustration as his ongoing illnesses, says he just wants to be able to walk again     Objective:     Vitals:   Temp (24hrs), Av 3 °F (36 8 °C), Min:98 °F (36 7 °C), Max:98 9 °F (37 2 °C)    Temp:  [98 °F (36 7 °C)-98 9 °F (37 2 °C)] 98 °F (36 7 °C)  HR:  [86-97] 97  BP: (123-130)/(71-73) 127/71  SpO2:  [89 %-98 %] 90 %  Body mass index is 23 48 kg/m²  Input and Output Summary (last 24 hours): Intake/Output Summary (Last 24 hours) at 3/1/2021 1654  Last data filed at 3/1/2021 1301  Gross per 24 hour   Intake 580 ml   Output 1225 ml   Net -645 ml       Physical Exam:     Physical Exam  Vitals signs reviewed  Constitutional:       General: He is not in acute distress  Appearance: He is not toxic-appearing  HENT:      Head: Normocephalic and atraumatic  Eyes:      General: No scleral icterus  Extraocular Movements: Extraocular movements intact  Neck:      Musculoskeletal: Normal range of motion  Cardiovascular:      Rate and Rhythm: Normal rate and regular rhythm  Pulmonary:      Effort: Pulmonary effort is normal  No respiratory distress  Breath sounds: Rhonchi present  Comments: diminished  Abdominal:      General: Bowel sounds are normal  There is no distension  Palpations: Abdomen is soft  Musculoskeletal: Normal range of motion  Comments: S/p R AKA   Skin:     General: Skin is warm  Neurological:      General: No focal deficit present  Mental Status: He is alert and oriented to person, place, and time  Psychiatric:         Mood and Affect: Mood normal          Behavior: Behavior normal          Thought Content:  Thought content normal            Additional Data:     Labs:    Results from last 7 days   Lab Units 02/27/21  0612 02/26/21  1628   WBC Thousand/uL 10 46* 20 01*   HEMOGLOBIN g/dL 8 1* 9 2*   HEMATOCRIT % 26 9* 30 3*   PLATELETS Thousands/uL 272 393*   NEUTROS PCT %  --  95*   LYMPHS PCT %  --  3*   MONOS PCT %  --  1*   EOS PCT %  --  0     Results from last 7 days   Lab Units 02/27/21  0612 02/26/21  1628   SODIUM mmol/L 139 132*   POTASSIUM mmol/L 3 8 4 8   CHLORIDE mmol/L 108 102   CO2 mmol/L 26 28   BUN mg/dL 15 17   CREATININE mg/dL 0 44* 0 64   ANION GAP mmol/L 5 2*   CALCIUM mg/dL 8 6 8 7   ALBUMIN g/dL  --  1 5*   TOTAL BILIRUBIN mg/dL  --  0 39   ALK PHOS U/L  --  114   ALT U/L  -- 8*   AST U/L  --  11   GLUCOSE RANDOM mg/dL 161* 282*     Results from last 7 days   Lab Units 02/26/21  1628   INR  2 77*     Results from last 7 days   Lab Units 03/01/21  0512 03/01/21  0013 02/28/21  2125 02/28/21  1553 02/28/21  1048 02/27/21  2111 02/27/21  1625 02/25/21  1656 02/25/21  1116 02/25/21  0735 02/24/21  2055 02/24/21  1604   POC GLUCOSE mg/dl 211* 262* 229* 199* 325* 293* 213* 121 220* 159* 154* 154*     Results from last 7 days   Lab Units 02/26/21  0630   HEMOGLOBIN A1C % 5 3               * I Have Reviewed All Lab Data Listed Above  * Additional Pertinent Lab Tests Reviewed:  All Labs Within Last 24 Hours Reviewed    Imaging:    Imaging Reports Reviewed Today Include: none  Imaging Personally Reviewed by Myself Includes:  none    Recent Cultures (last 7 days):     Results from last 7 days   Lab Units 02/28/21  0837   SPUTUM CULTURE  Culture too young- will reincubate   GRAM STAIN RESULT  3+ Polys*  4+ Gram negative rods*  No Epithelial cells seen*       Last 24 Hours Medication List:   Current Facility-Administered Medications   Medication Dose Route Frequency Provider Last Rate    acetaminophen  650 mg Oral Q4H PRN Brooks Salazar MD      albuterol  2 puff Inhalation Q4H PRN Tito Thompson DO      apixaban  10 mg Oral BID Marsha Forbes PA-C      [START ON 3/2/2021] apixaban  5 mg Oral BID Marsha Forbes PA-C      bisacodyl  10 mg Rectal Daily PRN Tito Thompson,       budesonide  0 5 mg Nebulization Q12H Heather Mckeon DO      cholecalciferol  2,000 Units Oral Daily Tito Thompson,       formoterol  20 mcg Nebulization Q12H Heather Mckeon DO      glycopyrrolate  1 mg Oral TID Tito Thompson, DO      guaiFENesin  600 mg Oral Q12H [address] Heather Mckeon, DO      insulin glargine  15 Units Subcutaneous HS Marsha Forbes PA-C      insulin lispro  5 Units Subcutaneous TID With Meals Brooks Salazar MD      ipratropium  0 5 mg Nebulization Q6H Suzanne Mederos MD      levalbuterol  1 25 mg Nebulization Q6H Suzanne Mederos MD      Lidocaine Viscous HCl  15 mL Swish & Spit 4x Daily PRN Tarun Arshad,       lisinopril  10 mg Oral Daily Tarun Arshad,       menthol-methyl salicylate   Apply externally 4x Daily PRN Tarun Arshad, DO      methadone  70 mg Oral Daily Suzanne Mederos MD      [facility] 100 mL IVPB  100 mg Intravenous Q24H Tarun Arshad,  Stopped (03/01/21 2308)    multivitamin-minerals  1 tablet Oral Daily Tarun Arshad,       ondansetron  4 mg Intravenous Q6H PRN Tarun Arshad,       oxyCODONE  2 5 mg Oral Q6H PRN Suzanne Mederos MD      piperacillin-tazobactam  4 5 g Intravenous Q6H Enedina Mcclellan MD 4 5 g (03/01/21 1437)    polyethylene glycol  17 g Oral Daily Tarun Arshad DO          Today, Patient Was Seen By: Viktor Rosales PA-C    ** Please Note: Dictation voice to text software may have been used in the creation of this document   **

## 2021-03-01 NOTE — ASSESSMENT & PLAN NOTE
· 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  · Continue Eliquis - transition from 10 mg BID to 5 mg BID tomorrow, wound care

## 2021-03-01 NOTE — RESPIRATORY THERAPY NOTE
RT Protocol Note  Renetta Martins 56 y o  male MRN: [id number]  Unit/Bed#: -01 Encounter: [id number]    Assessment    Principal Problem:    Acute respiratory failure with hypoxia ([place] [address] )  Active Problems:    Methadone maintenance therapy patient ([place] [address] )    Type 2 diabetes mellitus, with long-term current use of insulin (HCC)    Ischemia of right lower extremity with suspected rhabdomyolysis    Right femoral vein DVT (HCC)    Left Hydropneumothorax    Hypoxia      Home Pulmonary Medications:  Albuterol         Past Medical History:   Diagnosis Date    Diabetes mellitus ([place] [address] )     GERD (gastroesophageal reflux disease)     Hypercholesteremia     Hypertension     Methadone use ([place] [address] )      Social History     Socioeconomic History    Marital status:      Spouse name: None    Number of children: None    Years of education: None    Highest education level: None   Occupational History    None   Social Needs    Financial resource strain: None    Food insecurity     Worry: None     Inability: None    Transportation needs     Medical: None     Non-medical: None   Tobacco Use    Smoking status: Former Smoker    Smokeless tobacco: Never Used   Substance and Sexual Activity    Alcohol use: Not Currently    Drug use: No     Comment: methadone    Sexual activity: None   Lifestyle    Physical activity     Days per week: None     Minutes per session: None    Stress: None   Relationships    Social connections     Talks on phone: None     Gets together: None     Attends Ca[employer] service: None     Active member of club or organization: None     Attends meetings of clubs or organizations: None     Relationship status: None    Intimate partner violence     Fear of current or ex partner: None     Emotionally abused: None     Physically abused: None     Forced sexual activity: None   Other Topics Concern    None   Social History Narrative    None       Subjective         Objective    Physical Exam:   Cough: Non-productive, Moist    Vitals:  Blood pressure 130/73, pulse 86, temperature 98 °F (36 7 °C), resp  rate 16, height 5' 6" (1 676 m), weight 66 kg (145 lb 8 1 oz), SpO2 90 %  Imaging and other studies: I have personally reviewed pertinent reports  Plan       Airway Clearance Plan: Flutter     Resp Comments: Pt started on Flutter valve to mobilize retain secretions  Pt admited with hypoxia requiring no 2LNC  PMHx of DKA/COVID-19 requiring intubation  Last CXR shows interstitial/fibrotic markings throughout

## 2021-03-01 NOTE — APP STUDENT NOTE
[facility] STUDENT  Inpatient Progress Note for TRAINING ONLY  Not Part of Legal Medical Record     Progress Note - Renetta Martins 56 y o  male MRN: [id number]  Unit/Bed#: -01 Encounter: [id number]        * Acute respiratory failure with hypoxia (HCC)  Assessment & Plan  · Multifactorial  · Continue supplemental oxygen  · Wean as tolerated to keep O2 sats 92-93%  · Pulmonary following  · Pulmonary input noted  · Currently on room air with stable O2 sats    Hypoxia  Assessment & Plan  · With previous complicated [id number] case requiring intubation, additionally with empyema on micafungin  · Presented from rehab with reported hypoxia with oxygen requirement and increased work of breathing, now improving  · CTA PE study negative for pulmonary embolism, and with improving known empyema; persistent changes noted  · Per pulmonology continue supplemental oxygen as needed, wean for goal of O2 sat 92-93%  · Incentive spirometry, pulmonary toileting    Left Hydropneumothorax  Assessment & Plan  · Improving as per CTA PE study 2/26  · S/p prior pigtail, has been removed  · Pulmonology following    Ischemia of right lower extremity with suspected rhabdomyolysis  Assessment & Plan  · 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  · Continue Eliquis, wound care    Empyema lung ([facility] [address] )  Assessment & Plan  · Left-sided candidal empyema  · Per ID:  · Continue micafungin through 03/12/2021  · May eventually need decortication  · Recheck CBC with diff and CMP at least weekly while on treatment    Stage II pressure ulcer of sacral region [facility])  Assessment & Plan  · Encourage frequent repositioning  · Local wound care    Right femoral vein DVT (HCC)  Assessment & Plan  · Continue Eliquis    Type 2 diabetes mellitus, with long-term current use of insulin [facility])  Assessment & Plan  Lab Results   Component Value Date    HGBA1C 5 3 02/26/2021       Recent Labs     02/28/21  1553 02/28/21  2125 03/01/21  0013 03/01/21  0512   POCGLU 199* 229* 262* 211*       Blood Sugar Average: Last 72 hrs:  · (P) 247 [id number]       · Continue Lantus, and Humalog t i d  With meals  · Titrate insulin dose based on Accu-Cheks  · Avoid hypoglycemia  · Hypoglycemia protocol in place    Methadone maintenance therapy patient [facility])  Assessment & Plan  · On chronic methadone 70 mg daily       VTE Pharmacologic Prophylaxis:   Pharmacologic: Apixaban (Eliquis)  Mechanical VTE Prophylaxis in Place: No    Patient Centered Rounds: Will discuss patient with preceptor    Discussions with Specialists or Other Care Team Provider: Reviewed notes from other care teams    Education and Discussions with Family / Patient: patient    Time Spent for Care: 30 minutes  More than 50% of total time spent on counseling and coordination of care as described above  Current Length of Stay: 1 day(s)    Current Patient Status: Inpatient   Certification Statement: The patient will continue to require additional inpatient hospital stay due to management of acute respiratory failure    Discharge Plan: pending clinical course    Code Status: Level 1 - Full Code    Subjective:   Patient is sitting in chair at time of visit  He states that he is feeling fatigued and short of breath at times, no more or less than his baseline these past few months  He notes 7-8/10 pain at his right AKA site and his sacral ulcer  He reports an intermittent dry cough  He notes one episode of vomiting this morning around 2am where he vomited a small amount of yellow/green fluid  He denies feeling nauseous  He was able to eat breakfast without issue  He states that he has been "feeling down "    Objective:     Vitals:   Temp (24hrs), Av 7 °F (37 1 °C), Min:98 °F (36 7 °C), Max:99 1 °F (37 3 °C)    Temp:  [98 °F (36 7 °C)-99 1 °F (37 3 °C)] 98 °F (36 7 °C)  HR:  [86-99] 86  BP: (123-130)/(73) 130/73  SpO2:  [89 %-100 %] 90 %  Body mass index is 23 48 kg/m²  Input and Output Summary (last 24 hours):        Intake/Output Summary (Last 24 hours) at 3/1/2021 1144  Last data filed at 3/1/2021 0954  Gross per 24 hour   Intake 820 ml   Output 925 ml   Net -105 ml       Physical Exam:     Physical Exam  Constitutional:       Appearance: Normal appearance  He is normal weight  HENT:      Head: Normocephalic and atraumatic  Mouth/Throat:      Mouth: Mucous membranes are moist       Pharynx: Oropharynx is clear  Eyes:      Conjunctiva/sclera: Conjunctivae normal    Cardiovascular:      Rate and Rhythm: Normal rate and regular rhythm  Heart sounds: Normal heart sounds  Pulmonary:      Effort: Respiratory distress ( Mild conversational dyspnea) present  Chest:      Chest wall: Tenderness present  Abdominal:      General: Bowel sounds are normal  There is no distension  Palpations: Abdomen is soft  Tenderness: There is no abdominal tenderness  Musculoskeletal:      Left lower leg: No edema  Comments: Right AKA  Sutures in place, site clean and dry  No erythema or edema  Skin:     General: Skin is warm and dry  Neurological:      General: No focal deficit present  Mental Status: He is alert and oriented to person, place, and time  Psychiatric:         Behavior: Behavior normal          Thought Content: Thought content normal       Comments: "feeling down"         Historical Information   Past Medical History:   Diagnosis Date    Diabetes mellitus ([place] [address] )     GERD (gastroesophageal reflux disease)     Hypercholesteremia     Hypertension     Methadone use ([place] [address] )      Past Surgical History:   Procedure Laterality Date    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    Procedure: AMPUTATION ABOVE KNEE (AKA);   Surgeon: Natalie Herron MD;  [facility];  Service: Vascular    AMPUTATION ABOVE KNEE (AKA) Right 1/18/2021    Procedure: AMPUTATION ABOVE KNEE (AKA) FORMALIZATION,  R AKA wound washout, wound closure;  Surgeon: Natalie Herron MD;  [facility];  Service: Vascular    ARTERIOGRAM Right 1/13/2021    Procedure: ARTERIOGRAM;  Surgeon: Clement Hebert DO;  [facility];  Service: Vascular    IR CHEST TUBE PLACEMENT  2/10/2021    IR LOWER EXTREMITY ANGIOGRAM  1/14/2021    THROMBECTOMY W/ EMBOLECTOMY Right 1/13/2021    Procedure: EMBOLECTOMY/THROMBECTOMY LOWER EXTREMITY;  Surgeon: Clement Hebert DO;  [facility];  Service: Vascular     Social History   Social History     Substance and Sexual Activity   Alcohol Use Not Currently     Social History     Substance and Sexual Activity   Drug Use No    Comment: methadone     Social History     Tobacco Use   Smoking Status Former Smoker   Smokeless Tobacco Never Used     Family History:   Family History   Problem Relation Age of Onset    Diabetes Mother     Hypertension Father     Leukemia Father     Acute lymphoblastic leukemia Father     Cancer Sister        Meds/Allergies   all medications and allergies reviewed  Allergies   Allergen Reactions    Varenicline        Additional Data:     Labs:    Results from last 7 days   Lab Units 02/27/21  0612 02/26/21  1628   WBC Thousand/uL 10 46* 20 01*   HEMOGLOBIN g/dL 8 1* 9 2*   HEMATOCRIT % 26 9* 30 3*   PLATELETS Thousands/uL 272 393*   NEUTROS PCT %  --  95*   LYMPHS PCT %  --  3*   MONOS PCT %  --  1*   EOS PCT %  --  0     Results from last 7 days   Lab Units 02/27/21  0612 02/26/21  1628   SODIUM mmol/L 139 132*   POTASSIUM mmol/L 3 8 4 8   CHLORIDE mmol/L 108 102   CO2 mmol/L 26 28   BUN mg/dL 15 17   CREATININE mg/dL 0 44* 0 64   ANION GAP mmol/L 5 2*   CALCIUM mg/dL 8 6 8 7   ALBUMIN g/dL  --  1 5*   TOTAL BILIRUBIN mg/dL  --  0 39   ALK PHOS U/L  --  114   ALT U/L  --  8*   AST U/L  --  11   GLUCOSE RANDOM mg/dL 161* 282*     Results from last 7 days   Lab Units 02/26/21  1628   INR  2 77*     Results from last 7 days   Lab Units 03/01/21  0512 03/01/21  0013 02/28/21  2125 02/28/21  1553 02/28/21  1048 02/27/21  2111 02/27/21  1625 02/25/21  1656 02/25/21  1116 02/25/21  0735 02/24/21 2055 02/24/21  1604   POC GLUCOSE mg/dl 211* 262* 229* 199* 325* 293* 213* 121 220* 159* 154* 154*     Results from last 7 days   Lab Units 02/26/21  0630   HEMOGLOBIN A1C % 5 3             * I Have Reviewed All Lab Data Listed Above  * Additional Pertinent Lab Tests Reviewed:  All Labs Within Last 24 Hours Reviewed    Imaging:    Imaging Reports Reviewed Today Include: imaging from 2/2021  Imaging Personally Reviewed by Myself Includes:  none    Recent Cultures (last 7 days):     Results from last 7 days   Lab Units 02/28/21  0837   SPUTUM CULTURE  Culture too young- will reincubate   GRAM STAIN RESULT  3+ Polys*  4+ Gram negative rods*  No Epithelial cells seen*       Last 24 Hours Medication List:   Current Facility-Administered Medications   Medication Dose Route Frequency Provider Last Rate    acetaminophen  650 mg Oral Q4H PRN Sera Lawson MD      albuterol  2 puff Inhalation Q4H PRN Daria Suad, DO      apixaban  10 mg Oral BID Daria Suad, DO      bisacodyl  10 mg Rectal Daily PRN Daria Suad, DO      budesonide  0 5 mg Nebulization Q12H [facility], DO      cholecalciferol  2,000 Units Oral Daily [facility], DO      formoterol  20 mcg Nebulization Q12H [facility], DO      glycopyrrolate  1 mg Oral TID [facility], DO      guaiFENesin  600 mg Oral Q12H [facility], DO      insulin glargine  10 Units Subcutaneous HS Sera Lawson MD      insulin lispro  5 Units Subcutaneous TID With Meals Sera Lawson MD      ipratropium  0 5 mg Nebulization Q6H Sera Lawson MD      levalbuterol  1 25 mg Nebulization Q6H Sera Lawson MD      Lidocaine Viscous HCl  15 mL Swish & Spit 4x Daily PRN Daria Suad, DO      lisinopril  10 mg Oral Daily Daria Suad, DO      menthol-methyl salicylate   Apply externally 4x Daily PRN Daria Suad, DO      methadone  70 mg Oral Daily Luiz Díaz MD      [facility]) 100 mL IVPB  100 mg Intravenous Q24H Gordy Pastor DO Stopped (03/01/21 0954)    multivitamin-minerals  1 tablet Oral Daily Gordy Pastor DO      ondansetron  4 mg Intravenous Q6H PRN Gordy Pastor DO      oxyCODONE  2 5 mg Oral Q6H PRN Luiz Díaz MD      polyethylene glycol  17 g Oral Daily Gordy Pastor DO          Today, Patient Was Seen By: Clayton Barr    ** Please Note: Dictation voice to text software may have been used in the creation of this document   **

## 2021-03-02 LAB
ANION GAP SERPL CALCULATED.3IONS-SCNC: 4 MMOL/L (ref 4–13)
BACTERIA SPT RESP CULT: ABNORMAL
BASOPHILS # BLD AUTO: 0.04 THOUSANDS/ΜL (ref 0–0.1)
BASOPHILS NFR BLD AUTO: 0 % (ref 0–1)
BUN SERPL-MCNC: 10 MG/DL (ref 5–25)
CALCIUM SERPL-MCNC: 8.9 MG/DL (ref 8.3–10.1)
CHLORIDE SERPL-SCNC: 102 MMOL/L (ref 100–108)
CO2 SERPL-SCNC: 30 MMOL/L (ref 21–32)
CREAT SERPL-MCNC: 0.53 MG/DL (ref 0.6–1.3)
EOSINOPHIL # BLD AUTO: 0.06 THOUSAND/ΜL (ref 0–0.61)
EOSINOPHIL NFR BLD AUTO: 1 % (ref 0–6)
ERYTHROCYTE [DISTWIDTH] IN BLOOD BY AUTOMATED COUNT: 16.3 % (ref 11.6–15.1)
GFR SERPL CREATININE-BSD FRML MDRD: 113 ML/MIN/1.73SQ M
GLUCOSE SERPL-MCNC: 200 MG/DL (ref 65–140)
GLUCOSE SERPL-MCNC: 218 MG/DL (ref 65–140)
GLUCOSE SERPL-MCNC: 245 MG/DL (ref 65–140)
GLUCOSE SERPL-MCNC: 251 MG/DL (ref 65–140)
GRAM STN SPEC: ABNORMAL
HCT VFR BLD AUTO: 27.2 % (ref 36.5–49.3)
HGB BLD-MCNC: 8.2 G/DL (ref 12–17)
IMM GRANULOCYTES # BLD AUTO: 0.04 THOUSAND/UL (ref 0–0.2)
IMM GRANULOCYTES NFR BLD AUTO: 0 % (ref 0–2)
LYMPHOCYTES # BLD AUTO: 0.85 THOUSANDS/ΜL (ref 0.6–4.47)
LYMPHOCYTES NFR BLD AUTO: 9 % (ref 14–44)
MCH RBC QN AUTO: 26.9 PG (ref 26.8–34.3)
MCHC RBC AUTO-ENTMCNC: 30.1 G/DL (ref 31.4–37.4)
MCV RBC AUTO: 89 FL (ref 82–98)
MONOCYTES # BLD AUTO: 0.52 THOUSAND/ΜL (ref 0.17–1.22)
MONOCYTES NFR BLD AUTO: 5 % (ref 4–12)
NEUTROPHILS # BLD AUTO: 8.33 THOUSANDS/ΜL (ref 1.85–7.62)
NEUTS SEG NFR BLD AUTO: 85 % (ref 43–75)
NRBC BLD AUTO-RTO: 0 /100 WBCS
PLATELET # BLD AUTO: 253 THOUSANDS/UL (ref 149–390)
PMV BLD AUTO: 10.4 FL (ref 8.9–12.7)
POTASSIUM SERPL-SCNC: 4.1 MMOL/L (ref 3.5–5.3)
PROCALCITONIN SERPL-MCNC: 0.21 NG/ML
RBC # BLD AUTO: 3.05 MILLION/UL (ref 3.88–5.62)
SODIUM SERPL-SCNC: 136 MMOL/L (ref 136–145)
WBC # BLD AUTO: 9.84 THOUSAND/UL (ref 4.31–10.16)

## 2021-03-02 PROCEDURE — 94668 MNPJ CHEST WALL SBSQ: CPT

## 2021-03-02 PROCEDURE — 94640 AIRWAY INHALATION TREATMENT: CPT

## 2021-03-02 PROCEDURE — 94664 DEMO&/EVAL PT USE INHALER: CPT

## 2021-03-02 PROCEDURE — 84145 PROCALCITONIN (PCT): CPT | Performed by: INTERNAL MEDICINE

## 2021-03-02 PROCEDURE — 80048 BASIC METABOLIC PNL TOTAL CA: CPT | Performed by: PHYSICIAN ASSISTANT

## 2021-03-02 PROCEDURE — 94760 N-INVAS EAR/PLS OXIMETRY 1: CPT

## 2021-03-02 PROCEDURE — 99232 SBSQ HOSP IP/OBS MODERATE 35: CPT | Performed by: INTERNAL MEDICINE

## 2021-03-02 PROCEDURE — 85025 COMPLETE CBC W/AUTO DIFF WBC: CPT | Performed by: PHYSICIAN ASSISTANT

## 2021-03-02 PROCEDURE — 82948 REAGENT STRIP/BLOOD GLUCOSE: CPT

## 2021-03-02 RX ADMIN — INSULIN LISPRO 5 UNITS: 100 INJECTION, SOLUTION INTRAVENOUS; SUBCUTANEOUS at 07:22

## 2021-03-02 RX ADMIN — INSULIN LISPRO 5 UNITS: 100 INJECTION, SOLUTION INTRAVENOUS; SUBCUTANEOUS at 11:53

## 2021-03-02 RX ADMIN — GLYCOPYRROLATE 1 MG: 1 TABLET ORAL at 18:21

## 2021-03-02 RX ADMIN — INSULIN GLARGINE 15 UNITS: 100 INJECTION, SOLUTION SUBCUTANEOUS at 21:03

## 2021-03-02 RX ADMIN — APIXABAN 5 MG: 5 TABLET, FILM COATED ORAL at 16:45

## 2021-03-02 RX ADMIN — Medication 2000 UNITS: at 09:06

## 2021-03-02 RX ADMIN — OXYCODONE HYDROCHLORIDE 2.5 MG: 5 TABLET ORAL at 03:11

## 2021-03-02 RX ADMIN — PIPERACILLIN AND TAZOBACTAM 4.5 G: 36; 4.5 INJECTION, POWDER, FOR SOLUTION INTRAVENOUS at 13:42

## 2021-03-02 RX ADMIN — LEVALBUTEROL HYDROCHLORIDE 1.25 MG: 1.25 SOLUTION, CONCENTRATE RESPIRATORY (INHALATION) at 19:20

## 2021-03-02 RX ADMIN — BUDESONIDE 0.5 MG: 0.5 INHALANT ORAL at 19:20

## 2021-03-02 RX ADMIN — OXYCODONE HYDROCHLORIDE 2.5 MG: 5 TABLET ORAL at 23:28

## 2021-03-02 RX ADMIN — PIPERACILLIN AND TAZOBACTAM 4.5 G: 36; 4.5 INJECTION, POWDER, FOR SOLUTION INTRAVENOUS at 01:04

## 2021-03-02 RX ADMIN — IPRATROPIUM BROMIDE 0.5 MG: 0.5 SOLUTION RESPIRATORY (INHALATION) at 08:13

## 2021-03-02 RX ADMIN — BUDESONIDE 0.5 MG: 0.5 INHALANT ORAL at 08:13

## 2021-03-02 RX ADMIN — IPRATROPIUM BROMIDE 0.5 MG: 0.5 SOLUTION RESPIRATORY (INHALATION) at 13:09

## 2021-03-02 RX ADMIN — GLYCOPYRROLATE 1 MG: 1 TABLET ORAL at 09:08

## 2021-03-02 RX ADMIN — Medication 1 TABLET: at 09:05

## 2021-03-02 RX ADMIN — MICAFUNGIN SODIUM 100 MG: 50 INJECTION, POWDER, LYOPHILIZED, FOR SOLUTION INTRAVENOUS at 09:12

## 2021-03-02 RX ADMIN — OXYCODONE HYDROCHLORIDE 2.5 MG: 5 TABLET ORAL at 15:44

## 2021-03-02 RX ADMIN — INSULIN LISPRO 5 UNITS: 100 INJECTION, SOLUTION INTRAVENOUS; SUBCUTANEOUS at 16:45

## 2021-03-02 RX ADMIN — LEVALBUTEROL HYDROCHLORIDE 1.25 MG: 1.25 SOLUTION, CONCENTRATE RESPIRATORY (INHALATION) at 13:09

## 2021-03-02 RX ADMIN — GLYCOPYRROLATE 1 MG: 1 TABLET ORAL at 21:04

## 2021-03-02 RX ADMIN — FORMOTEROL FUMARATE DIHYDRATE 20 MCG: 20 SOLUTION RESPIRATORY (INHALATION) at 19:20

## 2021-03-02 RX ADMIN — LEVALBUTEROL HYDROCHLORIDE 1.25 MG: 1.25 SOLUTION, CONCENTRATE RESPIRATORY (INHALATION) at 08:13

## 2021-03-02 RX ADMIN — IPRATROPIUM BROMIDE 0.5 MG: 0.5 SOLUTION RESPIRATORY (INHALATION) at 00:47

## 2021-03-02 RX ADMIN — PIPERACILLIN AND TAZOBACTAM 4.5 G: 36; 4.5 INJECTION, POWDER, FOR SOLUTION INTRAVENOUS at 07:22

## 2021-03-02 RX ADMIN — LEVALBUTEROL HYDROCHLORIDE 1.25 MG: 1.25 SOLUTION, CONCENTRATE RESPIRATORY (INHALATION) at 00:47

## 2021-03-02 RX ADMIN — APIXABAN 5 MG: 5 TABLET, FILM COATED ORAL at 09:06

## 2021-03-02 RX ADMIN — GUAIFENESIN 600 MG: 600 TABLET, EXTENDED RELEASE ORAL at 09:06

## 2021-03-02 RX ADMIN — IPRATROPIUM BROMIDE 0.5 MG: 0.5 SOLUTION RESPIRATORY (INHALATION) at 19:20

## 2021-03-02 RX ADMIN — GUAIFENESIN 600 MG: 600 TABLET, EXTENDED RELEASE ORAL at 21:03

## 2021-03-02 RX ADMIN — METHADONE HYDROCHLORIDE 70 MG: 10 TABLET ORAL at 09:06

## 2021-03-02 RX ADMIN — LISINOPRIL 10 MG: 10 TABLET ORAL at 09:05

## 2021-03-02 RX ADMIN — PIPERACILLIN AND TAZOBACTAM 4.5 G: 36; 4.5 INJECTION, POWDER, FOR SOLUTION INTRAVENOUS at 19:35

## 2021-03-02 RX ADMIN — POLYETHYLENE GLYCOL 3350 17 G: 17 POWDER, FOR SOLUTION ORAL at 09:09

## 2021-03-02 NOTE — ASSESSMENT & PLAN NOTE
· Pt reports to persistent weakness of RUE, though admits it is getting better  · Seen by neurology last admission following stroke alert due to RUE weakness, MRI deferred at that time, CT head with no acute stroke  · Felt due to acute illness  · Continue PT

## 2021-03-02 NOTE — PLAN OF CARE
Problem: Potential for Falls  Goal: Patient will remain free of falls  Description: INTERVENTIONS:  - Assess patient frequently for physical needs  -  Identify cognitive and physical deficits and behaviors that affect risk of falls    -  [place] fall precautions as indicated by assessment   - Educate patient/family on patient safety including physical limitations  - Instruct patient to call for assistance with activity based on assessment  - Modify environment to reduce risk of injury  - Consider OT/PT consult to assist with strengthening/mobility  Outcome: Progressing     Problem: Prexisting or High Potential for Compromised Skin Integrity  Goal: Skin integrity is maintained or improved  Description: INTERVENTIONS:  - Identify patients at risk for skin breakdown  - Assess and monitor skin integrity  - Assess and monitor nutrition and hydration status  - Monitor labs   - Assess for incontinence   - Turn and reposition patient  - Assist with mobility/ambulation  - Relieve pressure over bony prominences  - Avoid friction and shearing  - Provide appropriate hygiene as needed including keeping skin clean and dry  - Evaluate need for skin moisturizer/barrier cream  - Collaborate with interdisciplinary team   - Patient/family teaching  - Consider wound care consult   Outcome: Progressing     Problem: PAIN - ADULT  Goal: Verbalizes/displays adequate comfort level or baseline comfort level  Description: Interventions:  - Encourage patient to monitor pain and request assistance  - Assess pain using appropriate pain scale  - Administer analgesics based on type and severity of pain and evaluate response  - Implement non-pharmacological measures as appropriate and evaluate response  - Consider cultural and social influences on pain and pain management  - Notify physician/advanced practitioner if interventions unsuccessful or patient reports new pain  Outcome: Progressing     Problem: INFECTION - ADULT  Goal: Absence or prevention of progression during hospitalization  Description: INTERVENTIONS:  - Assess and monitor for signs and symptoms of infection  - Monitor lab/diagnostic results  - Monitor all insertion sites, i e  indwelling lines, tubes, and drains  - Monitor endotracheal if appropriate and nasal secretions for changes in amount and color  - [place] appropriate cooling/warming therapies per order  - Administer medications as ordered  - Instruct and encourage patient and family to use good hand hygiene technique  - Identify and instruct in appropriate isolation precautions for identified infection/condition  Outcome: Progressing     Problem: SAFETY ADULT  Goal: Patient will remain free of falls  Description: INTERVENTIONS:  - Assess patient frequently for physical needs  -  Identify cognitive and physical deficits and behaviors that affect risk of falls    -  [place] fall precautions as indicated by assessment   - Educate patient/family on patient safety including physical limitations  - Instruct patient to call for assistance with activity based on assessment  - Modify environment to reduce risk of injury  - Consider OT/PT consult to assist with strengthening/mobility  Outcome: Progressing  Goal: Maintain or return to baseline ADL function  Description: INTERVENTIONS:  -  Assess patient's ability to carry out ADLs; assess patient's baseline for ADL function and identify physical deficits which impact ability to perform ADLs (bathing, care of mouth/teeth, toileting, grooming, dressing, etc )  - Assess/evaluate cause of self-care deficits   - Assess range of motion  - Assess patient's mobility; develop plan if impaired  - Assess patient's need for assistive devices and provide as appropriate  - Encourage maximum independence but intervene and supervise when necessary  - Involve family in performance of ADLs  - Assess for home care needs following discharge   - Consider OT consult to assist with ADL evaluation and planning for discharge  - Provide patient education as appropriate  Outcome: Progressing  Goal: Maintain or return mobility status to optimal level  Description: INTERVENTIONS:  - Assess patient's baseline mobility status (ambulation, transfers, stairs, etc )    - Identify cognitive and physical deficits and behaviors that affect mobility  - Identify mobility aids required to assist with transfers and/or ambulation (gait belt, sit-to-stand, lift, walker, cane, etc )  - [place] fall precautions as indicated by assessment  - Record patient progress and toleration of activity level on Mobility SBAR; progress patient to next Phase/Stage  - Instruct patient to call for assistance with activity based on assessment  - Consider rehabilitation consult to assist with strengthening/weightbearing, etc   Outcome: Progressing     Problem: DISCHARGE PLANNING  Goal: Discharge to home or other facility with appropriate resources  Description: INTERVENTIONS:  - Identify barriers to discharge w/patient and caregiver  - Arrange for needed discharge resources and transportation as appropriate  - Identify discharge learning needs (meds, wound care, etc )  - Arrange for interpretive services to assist at discharge as needed  - Refer to Case Management Department for coordinating discharge planning if the patient needs post-hospital services based on physician/advanced practitioner order or complex needs related to functional status, cognitive ability, or social support system  Outcome: Progressing     Problem: Knowledge Deficit  Goal: Patient/family/caregiver demonstrates understanding of disease process, treatment plan, medications, and discharge instructions  Description: Complete learning assessment and assess knowledge base    Interventions:  - Provide teaching at level of understanding  - Provide teaching via preferred learning methods  Outcome: Progressing     Problem: Nutrition/Hydration-ADULT  Goal: Nutrient/Hydration intake appropriate for improving, restoring or maintaining nutritional needs  Description: Monitor and assess patient's nutrition/hydration status for malnutrition  Collaborate with interdisciplinary team and initiate plan and interventions as ordered  Monitor patient's weight and dietary intake as ordered or per policy  Utilize nutrition screening tool and intervene as necessary  Determine patient's food preferences and provide high-protein, high-caloric foods as appropriate       INTERVENTIONS:  - Monitor oral intake, urinary output, labs, and treatment plans  - Assess nutrition and hydration status and recommend course of action  - Evaluate amount of meals eaten  - Assist patient with eating if necessary   - Allow adequate time for meals  - Recommend/ encourage appropriate diets, oral nutritional supplements, and vitamin/mineral supplements  - Order, calculate, and assess calorie counts as needed  - Recommend, monitor, and adjust tube feedings and TPN/PPN based on assessed needs  - Assess need for intravenous fluids  - Provide specific nutrition/hydration education as appropriate  - Include patient/family/caregiver in decisions related to nutrition  Outcome: Progressing     Problem: RESPIRATORY - ADULT  Goal: Achieves optimal ventilation and oxygenation  Description: INTERVENTIONS:  - Assess for changes in respiratory status  - Assess for changes in mentation and behavior  - Position to facilitate oxygenation and minimize respiratory effort  - Oxygen administered by appropriate delivery if ordered  - Initiate smoking cessation education as indicated  - Encourage broncho-pulmonary hygiene including cough, deep breathe, Incentive Spirometry  - Assess the need for suctioning and aspirate as needed  - Assess and instruct to report SOB or any respiratory difficulty  - Respiratory Therapy support as indicated  Outcome: Progressing     Problem: SKIN/TISSUE INTEGRITY - ADULT  Goal: Skin integrity remains intact  Description: INTERVENTIONS  - Identify patients at risk for skin breakdown  - Assess and monitor skin integrity  - Assess and monitor nutrition and hydration status  - Monitor labs (i e  albumin)  - Assess for incontinence   - Turn and reposition patient  - Assist with mobility/ambulation  - Relieve pressure over bony prominences  - Avoid friction and shearing  - Provide appropriate hygiene as needed including keeping skin clean and dry  - Evaluate need for skin moisturizer/barrier cream  - Collaborate with interdisciplinary team (i e  Nutrition, Rehabilitation, etc )   - Patient/family teaching  Outcome: Progressing  Goal: Incision(s), wounds(s) or drain site(s) healing without S/S of infection  Description: INTERVENTIONS  - Assess and document risk factors for skin impairment   - Assess and document dressing, incision, wound bed, drain sites and surrounding tissue  - Consider nutrition services referral as needed  - Oral mucous membranes remain intact  - Provide patient/ family education  Outcome: Progressing     Problem: MUSCULOSKELETAL - ADULT  Goal: Maintain or return mobility to safest level of function  Description: INTERVENTIONS:  - Assess patient's ability to carry out ADLs; assess patient's baseline for ADL function and identify physical deficits which impact ability to perform ADLs (bathing, care of mouth/teeth, toileting, grooming, dressing, etc )  - Assess/evaluate cause of self-care deficits   - Assess range of motion  - Assess patient's mobility  - Assess patient's need for assistive devices and provide as appropriate  - Encourage maximum independence but intervene and supervise when necessary  - Involve family in performance of ADLs  - Assess for home care needs following discharge   - Consider OT consult to assist with ADL evaluation and planning for discharge  - Provide patient education as appropriate  Outcome: Progressing  Goal: Maintain proper alignment of affected body part  Description: INTERVENTIONS:  - Support, maintain and protect limb and body alignment  - Provide patient/ family with appropriate education  Outcome: Progressing

## 2021-03-02 NOTE — CASE MANAGEMENT
CM received call from Rupal stating medication would be $8000/month and suggested getting prior auth  Rupal informed CM once the doctor receives prior auth for the medication, the insurance will inform them which specialty pharmacy is used    OFELIA informed Pulmonology fellowDhiraj

## 2021-03-02 NOTE — APP STUDENT NOTE
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  · Sputum culture with gram negative rods, started on IV Zosyn for coverage of HCAP vs aspiration   · Pulm attempting to start anti fibrotic   · Continue supplemental oxygen  · Wean as tolerated to keep O2 sats 92-93%  · Pulmonary following  · Pulmonary input noted  · Currently on room air with stable O2 sats    Left Hydropneumothorax  Assessment & Plan  · Improving as per CTA PE study 2/26  · S/p prior pigtail, has been removed  · Pulmonology following    Ischemia of right lower extremity with suspected rhabdomyolysis  Assessment & Plan  · 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  · Continue Eliquis - transition from 10 mg BID to 5 mg BID tomorrow, wound care    Empyema lung ([facility] [address] )  Assessment & Plan  · Left-sided candidal empyema  · Per ID:  · Continue micafungin through 03/12/2021  · May eventually need decortication  · Recheck CBC with diff and CMP at least weekly while on treatment    Stage II pressure ulcer of sacral region [facility])  Assessment & Plan  · Encourage frequent repositioning  · Local wound care    Right femoral vein DVT ([facility] )  Assessment & Plan  · Continue Eliquis    Type 2 diabetes mellitus, with long-term current use of insulin [facility])  Assessment & Plan  Lab Results   Component Value Date    HGBA1C 5 3 02/26/2021       Recent Labs     03/01/21  0013 03/01/21  0512 03/01/21  1715 03/01/21  2123   POCGLU 262* 211* 290* 241*       Blood Sugar Average: Last 72 hrs:  · (P) 426 [id number]       · Continue Lantus, and Humalog t i d   With meals  · Increased Lantus to 15 units qhs given persistent hyperglycemia   · Titrate insulin dose based on Accu-Cheks  · Avoid hypoglycemia  · Hypoglycemia protocol in place    Methadone maintenance therapy patient [facility])  Assessment & Plan  · On chronic methadone 70 mg daily       VTE Pharmacologic Prophylaxis:   Pharmacologic: Apixaban (Eliquis)  Mechanical VTE Prophylaxis in Place: No    Patient Centered Rounds: Will discuss patient with preceptor    Discussions with Specialists or Other Care Team Provider: Reviewed notes from other care teams    Education and Discussions with Family / Patient: patient    Time Spent for Care: 30 minutes  More than 50% of total time spent on counseling and coordination of care as described above  Current Length of Stay: 2 day(s)    Current Patient Status: Inpatient   Certification Statement: The patient will continue to require additional inpatient hospital stay due to management of bacterial pneumonia    Discharge Plan: pending clinical course    Code Status: Level 1 - Full Code    Subjective:   Patient is resting in bed at time of visit  He states that he had an episode this morning of feelings severely short of breath  It has since resolved  He states that his cough is somewhat productive though he swallows the sputum and he thinks his neb treatments aid in his cough  He notes 7/10 pain in at the site of his right AKA which he describes at pulsating  He asked "will I ever walk again?"     Objective:     Vitals:   Temp (24hrs), Av 4 °F (36 9 °C), Min:98 °F (36 7 °C), Max:98 7 °F (37 1 °C)    Temp:  [98 °F (36 7 °C)-98 7 °F (37 1 °C)] 98 4 °F (36 9 °C)  HR:  [80-97] 92  BP: (127-138)/(71-75) 138/75  SpO2:  [90 %-99 %] 93 %  Body mass index is 23 48 kg/m²  Input and Output Summary (last 24 hours):        Intake/Output Summary (Last 24 hours) at 3/2/2021 0922  Last data filed at 3/2/2021 0500  Gross per 24 hour   Intake 420 ml   Output 1150 ml   Net -730 ml       Physical Exam:     Physical Exam  Constitutional:       Appearance: Normal appearance  He is normal weight  He is not ill-appearing  HENT:      Head: Normocephalic and atraumatic  Eyes:      General: No scleral icterus  Conjunctiva/sclera: Conjunctivae normal    Cardiovascular:      Rate and Rhythm: Normal rate and regular rhythm  Pulmonary:      Breath sounds: Rhonchi and rales present  Comments: Mild conversational dyspnea  Abdominal:      General: Bowel sounds are normal  There is no distension  Palpations: Abdomen is soft  Tenderness: There is no abdominal tenderness  There is no guarding  Musculoskeletal:         General: No swelling  Comments: Right AKA   Skin:     General: Skin is warm and dry  Findings: Bruising (on arms ) present  Neurological:      Mental Status: He is alert and oriented to person, place, and time  Sensory: No sensory deficit  Motor: Weakness present  Comments: 2/5  strength right hand   Psychiatric:         Behavior: Behavior normal          Thought Content: Thought content normal       Comments: "I feel like I am letting my family down"         Historical Information   Past Medical History:   Diagnosis Date    Diabetes mellitus ([place] [address] )     GERD (gastroesophageal reflux disease)     Hypercholesteremia     Hypertension     Methadone use ([place] [address] )      Past Surgical History:   Procedure Laterality Date    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    Procedure: AMPUTATION ABOVE KNEE (AKA);   Surgeon: Krystyna Zacarias MD;  [facility];  Service: Vascular    AMPUTATION ABOVE KNEE (AKA) Right 1/18/2021    Procedure: AMPUTATION ABOVE KNEE (AKA) FORMALIZATION,  R AKA wound washout, wound closure;  Surgeon: Krystyna Zacarias MD;  [facility];  Service: Vascular    ARTERIOGRAM Right 1/13/2021    Procedure: ARTERIOGRAM;  Surgeon: Milagro Sierra DO;  [facility];  Service: Vascular    IR CHEST TUBE PLACEMENT  2/10/2021    IR LOWER EXTREMITY ANGIOGRAM  1/14/2021    THROMBECTOMY W/ EMBOLECTOMY Right 1/13/2021    Procedure: EMBOLECTOMY/THROMBECTOMY LOWER EXTREMITY;  Surgeon: Shelia Anderson DO;  [facility];  Service: Vascular     Social History   Social History     Substance and Sexual Activity   Alcohol Use Not Currently     Social History     Substance and Sexual Activity   Drug Use No    Comment: methadone     Social History     Tobacco Use   Smoking Status Former Smoker   Smokeless Tobacco Never Used     Family History:   Family History   Problem Relation Age of Onset    Diabetes Mother     Hypertension Father     Leukemia Father     Acute lymphoblastic leukemia Father     Cancer Sister        Meds/Allergies   all medications and allergies reviewed  Allergies   Allergen Reactions    Varenicline        Additional Data:     Labs:    Results from last 7 days   Lab Units 03/02/21  0456   WBC Thousand/uL 9 84   HEMOGLOBIN g/dL 8 2*   HEMATOCRIT % 27 2*   PLATELETS Thousands/uL 253   NEUTROS PCT % 85*   LYMPHS PCT % 9*   MONOS PCT % 5   EOS PCT % 1     Results from last 7 days   Lab Units 03/02/21  0456  02/26/21  1628   SODIUM mmol/L 136   < > 132*   POTASSIUM mmol/L 4 1   < > 4 8   CHLORIDE mmol/L 102   < > 102   CO2 mmol/L 30   < > 28   BUN mg/dL 10   < > 17   CREATININE mg/dL 0 53*   < > 0 64   ANION GAP mmol/L 4   < > 2*   CALCIUM mg/dL 8 9   < > 8 7   ALBUMIN g/dL  --   --  1 5*   TOTAL BILIRUBIN mg/dL  --   --  0 39   ALK PHOS U/L  --   --  114   ALT U/L  --   --  8*   AST U/L  --   --  11   GLUCOSE RANDOM mg/dL 245*   < > 282*    < > = values in this interval not displayed       Results from last 7 days   Lab Units 02/26/21  1628   INR  2 77*     Results from last 7 days   Lab Units 03/01/21  2123 03/01/21  1715 03/01/21  0512 03/01/21  0013 02/28/21  2125 02/28/21  1553 02/28/21  1048 02/27/21  2111 02/27/21  1625 02/25/21  1656 02/25/21  1116 02/25/21  0735   POC GLUCOSE mg/dl 241* 290* 211* 262* 229* 199* 325* 293* 213* 121 220* 159*     Results from last 7 days   Lab Units 02/26/21  0630   HEMOGLOBIN A1C % 5 3     Results from last 7 days   Lab Units 03/02/21  0456   PROCALCITONIN ng/ml 0 21         * I Have Reviewed All Lab Data Listed Above  * Additional Pertinent Lab Tests Reviewed:  All Labs Within Last 24 Hours Reviewed    Imaging:    Imaging Reports Reviewed Today Include: nothing new  Imaging Personally Reviewed by Myself Includes:  none    Recent Cultures (last 7 days):     Results from last 7 days   Lab Units 02/28/21  0837   SPUTUM CULTURE  4+ Growth of Escherichia coli*   GRAM STAIN RESULT  3+ Polys*  4+ Gram negative rods*  No Epithelial cells seen*       Last 24 Hours Medication List:   Current Facility-Administered Medications   Medication Dose Route Frequency Provider Last Rate    acetaminophen  650 mg Oral Q4H PRN [place] Saldana MD      albuterol  2 puff Inhalation Q4H PRN Caty Men, DO      apixaban  5 mg Oral BID Marsha Forbes PA-C      bisacodyl  10 mg Rectal Daily PRN Caty Men, DO      budesonide  0 5 mg Nebulization Q12H Ace Mena, DO      cholecalciferol  2,000 Units Oral Daily Caty Men, DO      formoterol  20 mcg Nebulization Q12H Ace Mena, DO      glycopyrrolate  1 mg Oral TID Caty Men, DO      guaiFENesin  600 mg Oral Q12H [address] Ace Mena, DO      insulin glargine  15 Units Subcutaneous HS Marsha Forbes PA-C      insulin lispro  5 Units Subcutaneous TID With Meals [place] Saldana MD      ipratropium  0 5 mg Nebulization Q6H [place] Saldana MD      levalbuterol  1 25 mg Nebulization Q6H [place] MD      Lidocaine Viscous HCl  15 mL Swish & Spit 4x Daily PRN Caty Men, DO      lisinopril  10 mg Oral Daily Caty Men, DO      menthol-methyl salicylate   Apply externally 4x Daily PRN Caty Men, DO      methadone  70 mg Oral Daily [place] Sal[place]ana MD      micafungin (MYCAMINE) 100 mL IVPB  100 mg Intravenous Q24H Lemon Hopping,  mg (03/02/21 0912)    multivitamin-minerals  1 tablet Oral Daily Lemon Hopping, DO      ondansetron  4 mg Intravenous Q6H PRN Lemon Hopping, DO      oxyCODONE  2 5 mg Oral Q6H PRN Severino Cardoso MD      piperacillin-tazobactam  4 5 g Intravenous Q6H Verna Escobar MD 4 5 g (03/02/21 0722)    polyethylene glycol  17 g Oral Daily Lemon Hopping, DO          Today, Patient Was Seen By: Clint Rangel    ** Please Note: Dictation voice to text software may have been used in the creation of this document   **

## 2021-03-02 NOTE — RESTORATIVE TECHNICIAN NOTE
Restorative Specialist Mobility Note       Activity: Chair, Dangle, Stand at bedside(Educated/encouraged pt to get In/OOB to the chair with assistance  Chair alarm on   Pt callbell, phone/tray within reach )     Assistive Device: Other (Comment)(HHA x2 stand-pivot OOB to the chair )       Renee GUALLPA, Restorative Technician, [employer]

## 2021-03-02 NOTE — CASE MANAGEMENT
CM called Juanita Patricio for f/u from phone call yesterday regarding specialty medication  Gilbert Patricio states the woman, Niall Willson who was supposed to check is still in a meeting  Gilbert Patricio informed CM she will return call once her meeting is over

## 2021-03-02 NOTE — PROGRESS NOTES
Progress Note - Pulmonary   Zackary De Leon 56 y o  male MRN: [id number]  Unit/Bed#: -01 Encounter: [id number]      Acute hypoxic respiratory failure secondary to aspiration vs hospital acquired pneumonia vs worsening fibrosis   - on room air, saturating well   - c/w xopenex, atrovent  -  budesonide and perforomist started on 2/27   - mucinex po bid   - encourage mobility   - sputum cx growing out E  Coli   - c/w zosyn for now, will change to Augmentin tomorrow for 5 days total   -c/w flutter valve   - CTA showed worsening bronchiectasis and honeycombing, with trace left pleural effusion and residual small left hydropneumothorax   - PCT 0 21 on 3/2   - given evidence of fibrosis on CTA, will discuss with case management regarding pirfenidone 801mg po tid -needs to be filled through a specialty pharmacy, awaiting response from CM    Candida albicans empyema   - on micafungin through 3/12 as per ID     History of left hydropneumothorax ex vacuo  - s/p left 10F IR chest tube on 2/11 - removed by thoracics on 2/17   - thoracic surgery consulted last admission for trapped lung and decortication - no surgical intervention at present likely outpatient      Hx COVID-19 pneumonia  - initially diagnosed on 12/31/2020 and was asymptomatic  Returned on 01/11 with symptoms of SOB requiring intubation on 01/12/2021 and was extubated on 01/25/2021     History of tobacco abuse     Hepatitis C  - doesn't appear to have been treated given new diagnosis in 2/2021      Right lower extremity emboli s/p AKA    - on Eliquis     Subjective:   Patient seen/examined this AM  His appetite is poor but is trying to eat  He has some cough but reported he has trouble getting it up  Review of Systems   Respiratory: Positive for cough and shortness of breath  All other systems reviewed and are negative        Objective:     Vitals:    03/01/21 1904 03/01/21 2122 03/02/21 0047 03/02/21 0733   BP:  137/74  138/75   Pulse:  80  92   Resp: Temp:  98 7 °F (37 1 °C)  98 4 °F (36 9 °C)   TempSrc:       SpO2: 97% 96% 99% 92%   Weight:       Height:               Intake/Output Summary (Last 24 hours) at 3/2/2021 0740  Last data filed at 3/2/2021 0500  Gross per 24 hour   Intake 900 ml   Output 1150 ml   Net -250 ml         Physical Exam  Constitutional:       Comments: thin   HENT:      Head: Normocephalic  Eyes:      Pupils: Pupils are equal, round, and reactive to light  Neck:      Musculoskeletal: Normal range of motion  Cardiovascular:      Rate and Rhythm: Normal rate and regular rhythm  Pulses: Normal pulses  Pulmonary:      Breath sounds: Rales present  Abdominal:      General: Abdomen is flat  Palpations: Abdomen is soft  Musculoskeletal:      Comments: R AKA    Skin:     General: Skin is warm and dry  Neurological:      General: No focal deficit present  Mental Status: He is alert and oriented to person, place, and time  Labs: I have personally reviewed pertinent lab results    Results from last 7 days   Lab Units 03/02/21  0456 02/27/21  0612 02/26/21  1628 02/25/21  0600   WBC Thousand/uL 9 84 10 46* 20 01* 11 03*   HEMOGLOBIN g/dL 8 2* 8 1* 9 2* 9 3*   HEMATOCRIT % 27 2* 26 9* 30 3* 31 4*   PLATELETS Thousands/uL 253 272 393* 388   NEUTROS PCT % 85*  --  95* 82*   MONOS PCT % 5  --  1* 5      Results from last 7 days   Lab Units 03/02/21  0456 02/27/21  0612 02/26/21  1628 02/25/21  0600   POTASSIUM mmol/L 4 1 3 8 4 8 3 0*   CHLORIDE mmol/L 102 108 102 112*   CO2 mmol/L 30 26 28 26   BUN mg/dL 10 15 17 13   CREATININE mg/dL 0 53* 0 44* 0 64 0 33*   CALCIUM mg/dL 8 9 8 6 8 7 7 4*   ALK PHOS U/L  --   --  114 96   ALT U/L  --   --  8* 8*   AST U/L  --   --  11 12              Results from last 7 days   Lab Units 02/26/21  1628   INR  2 77*   PTT seconds 62*         0   Lab Value Date/Time    TROPONINI <0 02 02/13/2021 2036    TROPONINI <0 03 01/11/2021 1020    TROPONINI <0 03 01/06/2021 0557    TROPONINI <0 03 12/31/2020 1955    TROPONINI <0 02 05/21/2020 1517    TROPONINI <0 02 05/21/2020 1228         Imaging and other studies: I have personally reviewed pertinent reports     and I have personally reviewed pertinent films in PACS        Quan Traylor MD   Pulmonary & Critical Care Fellow  [facility]

## 2021-03-02 NOTE — PROGRESS NOTES
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  · Sputum culture with gram negative rods, started on IV Zosyn for coverage of HCAP vs aspiration   · Likely transition to PO Augmentin tomorrow to complete antibiotic course  · Pulm attempting to start anti fibrotic   · Weaned to room air  · Pulmonary following    Stage II pressure ulcer of sacral region [facility])  Assessment & Plan  · Encourage frequent repositioning  · Local wound care    [facility]  Assessment & Plan  · Left-sided candidal empyema  · Per ID:  · Continue micafungin through 03/12/2021  · May eventually need decortication  · Recheck CBC with diff and CMP at least weekly while on treatment    Left Hydropneumothorax  Assessment & Plan  · Improving as per CTA PE study 2/26  · S/p prior pigtail, has been removed  · Pulmonology following    Right femoral vein DVT ([facility] )  Assessment & Plan  · Continue Eliquis    Ischemia of right lower extremity with suspected rhabdomyolysis  Assessment & Plan  · 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  · Continue Eliquis - transition from 10 mg BID to 5 mg BID today    Type 2 diabetes mellitus, with long-term current use of insulin [facility])  Assessment & Plan  Lab Results   Component Value Date    HGBA1C 5 3 02/26/2021       Recent Labs     03/01/21  0013 03/01/21  0512 03/01/21  1715 03/01/21  2123   POCGLU 262* 211* 290* 241*       Blood Sugar Average: Last 72 hrs:  · (P) 743 [id number]       · Continue Lantus, and Humalog t i d   With meals  · Increased Lantus to 15 units qhs given persistent hyperglycemia   · Titrate insulin dose based on Accu-Cheks  · Avoid hypoglycemia  · Hypoglycemia protocol in place    Methadone maintenance therapy patient [facility])  Assessment & Plan  · On chronic methadone 70 mg daily    Muscle weakness of right upper extremity  Assessment & Plan  · Pt reports to persistent weakness of RUE, though admits it is getting better  · Seen by neurology last admission following stroke alert due to RUE weakness, MRI deferred at that time, CT head with no acute stroke  · Felt due to acute illness  · Continue PT      VTE Pharmacologic Prophylaxis:   Pharmacologic: Apixaban (Eliquis)  Mechanical VTE Prophylaxis in Place: Yes    Patient Centered Rounds: I have performed bedside rounds with nursing staff today  Discussions with Specialists or Other Care Team Provider: RN    Education and Discussions with Family / Patient: patient, nephew over phone    Time Spent for Care: 20 minutes  More than 50% of total time spent on counseling and coordination of care as described above  Current Length of Stay: 2 day(s)    Current Patient Status: Inpatient   Certification Statement: The patient will continue to require additional inpatient hospital stay due to IV abx    Discharge Plan: when transitioned to PO abx and cleared by pulm, likely 24-48 hrs    Code Status: Level 1 - Full Code      Subjective: The patient overall feels improved from respiratory standpoint but still does get SOB occasionally     Objective:     Vitals:   Temp (24hrs), Av 4 °F (36 9 °C), Min:98 2 °F (36 8 °C), Max:98 7 °F (37 1 °C)    Temp:  [98 2 °F (36 8 °C)-98 7 °F (37 1 °C)] 98 2 °F (36 8 °C)  HR:  [80-98] 98  BP: (111-138)/(69-75) 111/69  SpO2:  [92 %-99 %] 98 %  Body mass index is 23 48 kg/m²  Input and Output Summary (last 24 hours):        Intake/Output Summary (Last 24 hours) at 3/2/2021 1737  Last data filed at 3/2/2021 1347  Gross per 24 hour   Intake 320 ml   Output 1350 ml   Net -1030 ml       Physical Exam:     Physical Exam  Vitals signs reviewed  Constitutional:       General: He is not in acute distress  Appearance: He is not toxic-appearing  HENT:      Head: Normocephalic and atraumatic  Eyes:      General: No scleral icterus  Extraocular Movements: Extraocular movements intact  Cardiovascular:      Rate and Rhythm: Normal rate and regular rhythm  Pulmonary:      Effort: Pulmonary effort is normal       Comments: diminished  Abdominal:      General: Bowel sounds are normal  There is no distension  Palpations: Abdomen is soft  Tenderness: There is no abdominal tenderness  Musculoskeletal:      Comments: S/p R AKA  RUE  strength 3/5, unable to do thumb to fingertips of right hand but can with left, able to move RUE    Skin:     General: Skin is warm and dry  Neurological:      General: No focal deficit present  Mental Status: He is alert and oriented to person, place, and time  Psychiatric:         Mood and Affect: Mood normal          Behavior: Behavior normal          Thought Content: Thought content normal          Additional Data:     Labs:    Results from last 7 days   Lab Units 03/02/21  0456   WBC Thousand/uL 9 84   HEMOGLOBIN g/dL 8 2*   HEMATOCRIT % 27 2*   PLATELETS Thousands/uL 253   NEUTROS PCT % 85*   LYMPHS PCT % 9*   MONOS PCT % 5   EOS PCT % 1     Results from last 7 days   Lab Units 03/02/21  0456  02/26/21  1628   SODIUM mmol/L 136   < > 132*   POTASSIUM mmol/L 4 1   < > 4 8   CHLORIDE mmol/L 102   < > 102   CO2 mmol/L 30   < > 28   BUN mg/dL 10   < > 17   CREATININE mg/dL 0 53*   < > 0 64   ANION GAP mmol/L 4   < > 2*   CALCIUM mg/dL 8 9   < > 8 7   ALBUMIN g/dL  --   --  1 5*   TOTAL BILIRUBIN mg/dL  --   --  0 39   ALK PHOS U/L  --   --  114   ALT U/L  --   --  8*   AST U/L  --   --  11   GLUCOSE RANDOM mg/dL 245*   < > 282*    < > = values in this interval not displayed       Results from last 7 days   Lab Units 02/26/21  1628   INR  2 77*     Results from last 7 days   Lab Units 03/02/21  1602 03/02/21  1056 03/01/21  2123 03/01/21  1715 03/01/21  0512 03/01/21  0013 02/28/21  2125 02/28/21  1553 02/28/21  1048 02/27/21  2111 02/27/21  1625 02/25/21  1656   POC GLUCOSE mg/dl 218* 200* 241* 290* 211* 262* 229* 199* 325* 293* 213* 121     Results from last 7 days   Lab Units 02/26/21  0630   HEMOGLOBIN A1C % 5 3     Results from last 7 days   Lab Units 03/02/21  0456   PROCALCITONIN ng/ml 0 21           * I Have Reviewed All Lab Data Listed Above  * Additional Pertinent Lab Tests Reviewed:  All Labs Within Last 24 Hours Reviewed    Imaging:    Imaging Reports Reviewed Today Include: none  Imaging Personally Reviewed by Myself Includes:  none    Recent Cultures (last 7 days):     Results from last 7 days   Lab Units 02/28/21  0837   SPUTUM CULTURE  4+ Growth of Escherichia coli*   GRAM STAIN RESULT  3+ Polys*  4+ Gram negative rods*  No Epithelial cells seen*       Last 24 Hours Medication List:   Current Facility-Administered Medications   Medication Dose Route Frequency Provider Last Rate    acetaminophen  650 mg Oral Q4H PRN Luiz Díaz MD      albuterol  2 puff Inhalation Q4H PRN Gordy Pastor DO      apixaban  5 mg Oral BID Marsha Forbes PA-C      bisacodyl  10 mg Rectal Daily PRN Gordy Pastor DO      budesonide  0 5 mg Nebulization Q12H Arne Spatz, DO      cholecalciferol  2,000 Units Oral Daily Gordy Pastor DO      formoterol  20 mcg Nebulization Q12H Arne Spatz, DO      glycopyrrolate  1 mg Oral TID Gordy Pastor DO      guaiFENesin  600 mg Oral Q12H [address] Arne Spatz, DO      insulin glargine  15 Units Subcutaneous HS Marsha Forbes PA-C      insulin lispro  5 Units Subcutaneous TID With Meals Luiz Díaz MD      ipratropium  0 5 mg Nebulization Q6H Luiz Díaz MD      levalbuterol  1 25 mg Nebulization Q6H Luiz Díaz MD  Lidocaine Viscous HCl  15 mL Swish & Spit 4x Daily PRN Aliyah Padgett, DO      lisinopril  10 mg Oral Daily Aliyah Padgett,       menthol-methyl salicylate   Apply externally 4x Daily PRN Aliyah Padgett, DO      methadone  70 mg Oral Daily Misha Aviles MD      [facility]) 100 mL IVPB  100 mg Intravenous Q24H Aliyah Padgett,  mg (03/02/21 0912)    multivitamin-minerals  1 tablet Oral Daily Aliyah Padgett DO      ondansetron  4 mg Intravenous Q6H PRN Aliyah Padgett,       oxyCODONE  2 5 mg Oral Q6H PRN Misha Aviles MD      piperacillin-tazobactam  4 5 g Intravenous Q6H Elida Calderón MD 4 5 g (03/02/21 1342)    polyethylene glycol  17 g Oral Daily Aliyah Padgett DO          Today, Patient Was Seen By: Oseas Freed PA-C    ** Please Note: Dictation voice to text software may have been used in the creation of this document   **

## 2021-03-02 NOTE — ASSESSMENT & PLAN NOTE
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

## 2021-03-02 NOTE — ASSESSMENT & PLAN NOTE
Lab Results   Component Value Date    HGBA1C 5 3 02/26/2021       Recent Labs     03/01/21  0013 03/01/21  0512 03/01/21  1715 03/01/21 2123   POCGLU 262* 211* 290* 241*       Blood Sugar Average: Last 72 hrs:  · (P) 436 [id number]       · Continue Lantus, and Humalog t i d   With meals  · Increased Lantus to 15 units qhs given persistent hyperglycemia   · Titrate insulin dose based on Accu-Cheks  · Avoid hypoglycemia  · Hypoglycemia protocol in place

## 2021-03-03 LAB
ANION GAP SERPL CALCULATED.3IONS-SCNC: 3 MMOL/L (ref 4–13)
BASOPHILS # BLD AUTO: 0.05 THOUSANDS/ΜL (ref 0–0.1)
BASOPHILS NFR BLD AUTO: 1 % (ref 0–1)
BUN SERPL-MCNC: 11 MG/DL (ref 5–25)
CALCIUM SERPL-MCNC: 8.7 MG/DL (ref 8.3–10.1)
CHLORIDE SERPL-SCNC: 101 MMOL/L (ref 100–108)
CO2 SERPL-SCNC: 29 MMOL/L (ref 21–32)
CREAT SERPL-MCNC: 0.5 MG/DL (ref 0.6–1.3)
EOSINOPHIL # BLD AUTO: 0.15 THOUSAND/ΜL (ref 0–0.61)
EOSINOPHIL NFR BLD AUTO: 2 % (ref 0–6)
ERYTHROCYTE [DISTWIDTH] IN BLOOD BY AUTOMATED COUNT: 16.6 % (ref 11.6–15.1)
GFR SERPL CREATININE-BSD FRML MDRD: 116 ML/MIN/1.73SQ M
GLUCOSE SERPL-MCNC: 218 MG/DL (ref 65–140)
GLUCOSE SERPL-MCNC: 251 MG/DL (ref 65–140)
HCT VFR BLD AUTO: 26.9 % (ref 36.5–49.3)
HGB BLD-MCNC: 8.2 G/DL (ref 12–17)
IMM GRANULOCYTES # BLD AUTO: 0.04 THOUSAND/UL (ref 0–0.2)
IMM GRANULOCYTES NFR BLD AUTO: 0 % (ref 0–2)
LYMPHOCYTES # BLD AUTO: 0.96 THOUSANDS/ΜL (ref 0.6–4.47)
LYMPHOCYTES NFR BLD AUTO: 10 % (ref 14–44)
MCH RBC QN AUTO: 27.2 PG (ref 26.8–34.3)
MCHC RBC AUTO-ENTMCNC: 30.5 G/DL (ref 31.4–37.4)
MCV RBC AUTO: 89 FL (ref 82–98)
MONOCYTES # BLD AUTO: 0.57 THOUSAND/ΜL (ref 0.17–1.22)
MONOCYTES NFR BLD AUTO: 6 % (ref 4–12)
NEUTROPHILS # BLD AUTO: 8.05 THOUSANDS/ΜL (ref 1.85–7.62)
NEUTS SEG NFR BLD AUTO: 81 % (ref 43–75)
NRBC BLD AUTO-RTO: 0 /100 WBCS
PLATELET # BLD AUTO: 245 THOUSANDS/UL (ref 149–390)
PMV BLD AUTO: 10.1 FL (ref 8.9–12.7)
POTASSIUM SERPL-SCNC: 4.3 MMOL/L (ref 3.5–5.3)
PROCALCITONIN SERPL-MCNC: 0.21 NG/ML
RBC # BLD AUTO: 3.01 MILLION/UL (ref 3.88–5.62)
SODIUM SERPL-SCNC: 133 MMOL/L (ref 136–145)
WBC # BLD AUTO: 9.82 THOUSAND/UL (ref 4.31–10.16)

## 2021-03-03 PROCEDURE — 97535 SELF CARE MNGMENT TRAINING: CPT

## 2021-03-03 PROCEDURE — 84145 PROCALCITONIN (PCT): CPT | Performed by: INTERNAL MEDICINE

## 2021-03-03 PROCEDURE — 82948 REAGENT STRIP/BLOOD GLUCOSE: CPT

## 2021-03-03 PROCEDURE — 94760 N-INVAS EAR/PLS OXIMETRY 1: CPT

## 2021-03-03 PROCEDURE — 97110 THERAPEUTIC EXERCISES: CPT

## 2021-03-03 PROCEDURE — 97530 THERAPEUTIC ACTIVITIES: CPT

## 2021-03-03 PROCEDURE — 80048 BASIC METABOLIC PNL TOTAL CA: CPT | Performed by: PHYSICIAN ASSISTANT

## 2021-03-03 PROCEDURE — 94668 MNPJ CHEST WALL SBSQ: CPT

## 2021-03-03 PROCEDURE — 99232 SBSQ HOSP IP/OBS MODERATE 35: CPT | Performed by: INTERNAL MEDICINE

## 2021-03-03 PROCEDURE — 85025 COMPLETE CBC W/AUTO DIFF WBC: CPT | Performed by: PHYSICIAN ASSISTANT

## 2021-03-03 PROCEDURE — 94640 AIRWAY INHALATION TREATMENT: CPT

## 2021-03-03 RX ORDER — BUDESONIDE 0.5 MG/2ML
0.5 INHALANT ORAL
Refills: 0
Start: 2021-03-03 | End: 2021-06-01

## 2021-03-03 RX ORDER — METHADONE HYDROCHLORIDE 10 MG/1
70 TABLET ORAL DAILY
Qty: 21 TABLET | Refills: 0 | Status: ON HOLD | OUTPATIENT
Start: 2021-03-03 | End: 2021-06-24 | Stop reason: SDUPTHER

## 2021-03-03 RX ORDER — LEVALBUTEROL 1.25 MG/.5ML
1.25 SOLUTION, CONCENTRATE RESPIRATORY (INHALATION)
Refills: 0
Start: 2021-03-03 | End: 2021-06-01

## 2021-03-03 RX ORDER — AMOXICILLIN AND CLAVULANATE POTASSIUM 875; 125 MG/1; MG/1
1 TABLET, FILM COATED ORAL EVERY 12 HOURS SCHEDULED
Qty: 9 TABLET | Refills: 0
Start: 2021-03-03 | End: 2021-03-08

## 2021-03-03 RX ORDER — GUAIFENESIN 600 MG
600 TABLET, EXTENDED RELEASE 12 HR ORAL EVERY 12 HOURS SCHEDULED
Refills: 0
Start: 2021-03-03 | End: 2021-06-01

## 2021-03-03 RX ORDER — AMOXICILLIN AND CLAVULANATE POTASSIUM 875; 125 MG/1; MG/1
1 TABLET, FILM COATED ORAL EVERY 12 HOURS SCHEDULED
Status: DISCONTINUED | OUTPATIENT
Start: 2021-03-03 | End: 2021-03-04 | Stop reason: HOSPADM

## 2021-03-03 RX ORDER — FORMOTEROL FUMARATE 20 UG/2ML
20 SOLUTION RESPIRATORY (INHALATION)
Refills: 0
Start: 2021-03-03 | End: 2021-06-01

## 2021-03-03 RX ADMIN — PIPERACILLIN AND TAZOBACTAM 4.5 G: 36; 4.5 INJECTION, POWDER, FOR SOLUTION INTRAVENOUS at 01:14

## 2021-03-03 RX ADMIN — LEVALBUTEROL HYDROCHLORIDE 1.25 MG: 1.25 SOLUTION, CONCENTRATE RESPIRATORY (INHALATION) at 13:17

## 2021-03-03 RX ADMIN — APIXABAN 5 MG: 5 TABLET, FILM COATED ORAL at 17:19

## 2021-03-03 RX ADMIN — FORMOTEROL FUMARATE DIHYDRATE 20 MCG: 20 SOLUTION RESPIRATORY (INHALATION) at 07:54

## 2021-03-03 RX ADMIN — LEVALBUTEROL HYDROCHLORIDE 1.25 MG: 1.25 SOLUTION, CONCENTRATE RESPIRATORY (INHALATION) at 01:04

## 2021-03-03 RX ADMIN — GLYCOPYRROLATE 1 MG: 1 TABLET ORAL at 20:56

## 2021-03-03 RX ADMIN — MICAFUNGIN SODIUM 100 MG: 50 INJECTION, POWDER, LYOPHILIZED, FOR SOLUTION INTRAVENOUS at 09:13

## 2021-03-03 RX ADMIN — INSULIN GLARGINE 15 UNITS: 100 INJECTION, SOLUTION SUBCUTANEOUS at 21:02

## 2021-03-03 RX ADMIN — PIPERACILLIN AND TAZOBACTAM 4.5 G: 36; 4.5 INJECTION, POWDER, FOR SOLUTION INTRAVENOUS at 06:49

## 2021-03-03 RX ADMIN — GUAIFENESIN 600 MG: 600 TABLET, EXTENDED RELEASE ORAL at 09:07

## 2021-03-03 RX ADMIN — POLYETHYLENE GLYCOL 3350 17 G: 17 POWDER, FOR SOLUTION ORAL at 09:07

## 2021-03-03 RX ADMIN — LEVALBUTEROL HYDROCHLORIDE 1.25 MG: 1.25 SOLUTION, CONCENTRATE RESPIRATORY (INHALATION) at 07:44

## 2021-03-03 RX ADMIN — INSULIN LISPRO 5 UNITS: 100 INJECTION, SOLUTION INTRAVENOUS; SUBCUTANEOUS at 11:44

## 2021-03-03 RX ADMIN — AMOXICILLIN AND CLAVULANATE POTASSIUM 1 TABLET: 875; 125 TABLET, FILM COATED ORAL at 10:19

## 2021-03-03 RX ADMIN — METHADONE HYDROCHLORIDE 70 MG: 10 TABLET ORAL at 09:06

## 2021-03-03 RX ADMIN — IPRATROPIUM BROMIDE 0.5 MG: 0.5 SOLUTION RESPIRATORY (INHALATION) at 19:09

## 2021-03-03 RX ADMIN — IPRATROPIUM BROMIDE 0.5 MG: 0.5 SOLUTION RESPIRATORY (INHALATION) at 07:44

## 2021-03-03 RX ADMIN — IPRATROPIUM BROMIDE 0.5 MG: 0.5 SOLUTION RESPIRATORY (INHALATION) at 01:04

## 2021-03-03 RX ADMIN — ACETAMINOPHEN 650 MG: 325 TABLET, FILM COATED ORAL at 05:22

## 2021-03-03 RX ADMIN — LISINOPRIL 10 MG: 10 TABLET ORAL at 09:07

## 2021-03-03 RX ADMIN — Medication 1 TABLET: at 09:07

## 2021-03-03 RX ADMIN — LEVALBUTEROL HYDROCHLORIDE 1.25 MG: 1.25 SOLUTION, CONCENTRATE RESPIRATORY (INHALATION) at 19:09

## 2021-03-03 RX ADMIN — FORMOTEROL FUMARATE DIHYDRATE 20 MCG: 20 SOLUTION RESPIRATORY (INHALATION) at 19:09

## 2021-03-03 RX ADMIN — BUDESONIDE 0.5 MG: 0.5 INHALANT ORAL at 07:44

## 2021-03-03 RX ADMIN — OXYCODONE HYDROCHLORIDE 2.5 MG: 5 TABLET ORAL at 10:19

## 2021-03-03 RX ADMIN — GUAIFENESIN 600 MG: 600 TABLET, EXTENDED RELEASE ORAL at 20:55

## 2021-03-03 RX ADMIN — INSULIN LISPRO 5 UNITS: 100 INJECTION, SOLUTION INTRAVENOUS; SUBCUTANEOUS at 17:22

## 2021-03-03 RX ADMIN — GLYCOPYRROLATE 1 MG: 1 TABLET ORAL at 17:18

## 2021-03-03 RX ADMIN — IPRATROPIUM BROMIDE 0.5 MG: 0.5 SOLUTION RESPIRATORY (INHALATION) at 13:17

## 2021-03-03 RX ADMIN — AMOXICILLIN AND CLAVULANATE POTASSIUM 1 TABLET: 875; 125 TABLET, FILM COATED ORAL at 20:55

## 2021-03-03 RX ADMIN — ACETAMINOPHEN 650 MG: 325 TABLET, FILM COATED ORAL at 20:55

## 2021-03-03 RX ADMIN — OXYCODONE HYDROCHLORIDE 2.5 MG: 5 TABLET ORAL at 17:19

## 2021-03-03 RX ADMIN — BUDESONIDE 0.5 MG: 0.5 INHALANT ORAL at 19:09

## 2021-03-03 RX ADMIN — INSULIN LISPRO 5 UNITS: 100 INJECTION, SOLUTION INTRAVENOUS; SUBCUTANEOUS at 06:53

## 2021-03-03 RX ADMIN — Medication 2000 UNITS: at 09:07

## 2021-03-03 RX ADMIN — APIXABAN 5 MG: 5 TABLET, FILM COATED ORAL at 09:07

## 2021-03-03 RX ADMIN — GLYCOPYRROLATE 1 MG: 1 TABLET ORAL at 09:08

## 2021-03-03 NOTE — PLAN OF CARE
Problem: PHYSICAL THERAPY ADULT  Goal: Performs mobility at highest level of function for planned discharge setting  See evaluation for individualized goals  Description: Treatment/Interventions: Functional transfer training, LE strengthening/ROM, Therapeutic exercise, Endurance training, Bed mobility          See flowsheet documentation for full assessment, interventions and recommendations  Outcome: Progressing  Note: Prognosis: Good  Problem List: Decreased strength, Decreased endurance, Impaired balance, Decreased mobility, Decreased cognition  Assessment: Pt presents to therapy today with reduced mobility, high risk of falling, poor activity tolerance, reduced strength  These impairments limit the patient by requiring increased assistance for mobility and places him at risk of falling  Pt would benefit from continued skilled therapy while in the hospital to improve overall mobility and work towards a safe d/c  Recommend rehab  At end of session patient was left seated with call bell within reach  The patient's AM-PAC Basic Mobility Inpatient Short Form Low Function Raw Score 19 , Standardized Score is  31 06  A standardized score less 42 9 suggests the patient may benefit from discharge to post-acute rehab services  Please also refer to the recommendation of the Physical Therapist for safe discharge planning  Pt tolerated session well  Pt able to perform transfer board transfer with increased assistance  Pt educated about reducing shearing force of his bottom but completing clearing his bottom when performing the transfer  Pt also educated about using a cushion when sitting in the chair  Barriers to Discharge: Inaccessible home environment, Decreased caregiver support     PT Discharge Recommendation: [id number]     PT - OK to Discharge: Yes    See flowsheet documentation for full assessment

## 2021-03-03 NOTE — ASSESSMENT & PLAN NOTE
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

## 2021-03-03 NOTE — PROGRESS NOTES
Progress Note - Pulmonary   Betzy Shankar 56 y o  male MRN: [id number]  Unit/Bed#: -01 Encounter: [id number]      Acute hypoxic respiratory failure secondary to E  Coli PNA with superimposed worsening fibrosis   - on room air, saturating well   - c/w xopenex, atrovent  -  budesonide and perforomist started on 2/27   - mucinex po bid   - encourage mobility   - sputum cx shows E  Coli   - s/p zosyn x 2 days, change to Augmentin today for 5 days total   -c/w flutter valve   - CTA showed worsening bronchiectasis and honeycombing, with trace left pleural effusion and residual small left hydropneumothorax   - PCT 0 21 on 3/2   - given evidence of fibrosis on CTA, discussed with case management regarding pirfenidone 801mg po tid -needs to be pre-authorized with insurance, pending paperwork to be filled out  - he may still go to rehab while this is in process     Candida albicans empyema   - on micafungin through 3/12 as per ID     History of left hydropneumothorax ex vacuo  - s/p left 10F IR chest tube on 2/11 - removed by thoracics on 2/17   - thoracic surgery consulted last admission for trapped lung and decortication - no surgical intervention at present likely outpatient      Hx COVID-19 pneumonia  - initially diagnosed on 12/31/2020 and returned 1/11 with requiring intubation on 01/12/2021 - 01/25/2021     History of tobacco abuse     Hepatitis C  - doesn't appear to have been treated given new diagnosis in 2/2021      Right lower extremity emboli s/p AKA    - on Eliquis     Subjective:   Patient seen/examined this AM  He reports he's coughing but not able to bring it up  He feels "paralyzed" and weak  Afebrile and has been off of oxygen  Review of Systems   Respiratory: Positive for cough  All other systems reviewed and are negative        Objective:     Vitals:    03/02/21 2107 03/03/21 0104 03/03/21 0654 03/03/21 0745   BP: 110/65  130/75    Pulse: 95      Resp:       Temp: 98 2 °F (36 8 °C)  98 1 °F (36 7 °C)    TempSrc:       SpO2: 97% 99%  99%   Weight:       Height:               Intake/Output Summary (Last 24 hours) at 3/3/2021 0756  Last data filed at 3/3/2021 0401  Gross per 24 hour   Intake --   Output 1000 ml   Net -1000 ml         Physical Exam  Constitutional:       Comments: thin   HENT:      Head: Normocephalic  Eyes:      Pupils: Pupils are equal, round, and reactive to light  Neck:      Musculoskeletal: Normal range of motion  Cardiovascular:      Rate and Rhythm: Normal rate and regular rhythm  Pulses: Normal pulses  Pulmonary:      Breath sounds: Rales present  Abdominal:      General: Abdomen is flat  Palpations: Abdomen is soft  Musculoskeletal:      Comments: R AKA    Skin:     General: Skin is warm and dry  Neurological:      General: No focal deficit present  Mental Status: He is alert and oriented to person, place, and time  Labs: I have personally reviewed pertinent lab results    Results from last 7 days   Lab Units 03/03/21  0518 03/02/21  0456 02/27/21  0612 02/26/21  1628   WBC Thousand/uL 9 82 9 84 10 46* 20 01*   HEMOGLOBIN g/dL 8 2* 8 2* 8 1* 9 2*   HEMATOCRIT % 26 9* 27 2* 26 9* 30 3*   PLATELETS Thousands/uL 245 253 272 393*   NEUTROS PCT % 81* 85*  --  95*   MONOS PCT % 6 5  --  1*      Results from last 7 days   Lab Units 03/03/21  0519 03/02/21  0456 02/27/21  0612 02/26/21  1628 02/25/21  0600   POTASSIUM mmol/L 4 3 4 1 3 8 4 8 3 0*   CHLORIDE mmol/L 101 102 108 102 112*   CO2 mmol/L 29 30 26 28 26   BUN mg/dL 11 10 15 17 13   CREATININE mg/dL 0 50* 0 53* 0 44* 0 64 0 33*   CALCIUM mg/dL 8 7 8 9 8 6 8 7 7 4*   ALK PHOS U/L  --   --   --  114 96   ALT U/L  --   --   --  8* 8*   AST U/L  --   --   --  11 12              Results from last 7 days   Lab Units 02/26/21  1628   INR  2 77*   PTT seconds 62*         0   Lab Value Date/Time    TROPONINI <0 02 02/13/2021 2036    TROPONINI <0 03 01/11/2021 1020    TROPONINI <0 03 01/06/2021 0557 TROPONINI <0 03 12/31/2020 1955    TROPONINI <0 02 05/21/2020 1517    TROPONINI <0 02 05/21/2020 1228         Imaging and other studies: I have personally reviewed pertinent reports     and I have personally reviewed pertinent films in PACS        Radha Jin MD   Pulmonary & Critical Care Fellow  [facility]

## 2021-03-03 NOTE — PHYSICAL THERAPY NOTE
Physical Therapy Treatment note     Patient Name: August Andreea CHAMBERS Date: 3/3/2021     Problem List  Principal Problem:    Acute respiratory failure with hypoxia ([facility] )  Active Problems:    Methadone maintenance therapy patient ([facility] )    Type 2 diabetes mellitus, with long-term current use of insulin (HCC)    Muscle weakness of right upper extremity    Ischemia of right lower extremity with suspected rhabdomyolysis    Right femoral vein DVT (HCC)    Left Hydropneumothorax    Empyema lung (HCC)    Stage II pressure ulcer of sacral region [facility])       Past Medical History  Past Medical History:   Diagnosis Date    Diabetes mellitus ([facility] )     GERD (gastroesophageal reflux disease)     Hypercholesteremia     Hypertension     Methadone use ([place] [address] )         Past Surgical History  Past Surgical History:   Procedure Laterality Date    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    Procedure: AMPUTATION ABOVE KNEE (AKA);   Surgeon: Justin Alcocer MD;  [facility];  Service: Vascular    AMPUTATION ABOVE KNEE (AKA) Right 1/18/2021    Procedure: AMPUTATION ABOVE KNEE (AKA) FORMALIZATION,  R AKA wound washout, wound closure;  Surgeon: Justin Alcocer MD;  [facility];  Service: Vascular    ARTERIOGRAM Right 1/13/2021    Procedure: ARTERIOGRAM;  Surgeon: Sonia Harvey DO;  [facility];  Service: Vascular    IR CHEST TUBE PLACEMENT  2/10/2021    IR LOWER EXTREMITY ANGIOGRAM  1/14/2021    THROMBECTOMY W/ EMBOLECTOMY Right 1/13/2021    Procedure: EMBOLECTOMY/THROMBECTOMY LOWER EXTREMITY;  Surgeon: Sonia Harvey DO;  [facility];  Service: Vascular           03/03/21 1154   PT Last Visit   PT Visit Date 03/03/21   Note Type   Note Type Treatment for insurance authorization   Pain Assessment   Pain Assessment Tool Pain Assessment not indicated - pt denies pain   Pain Score No Pain   Restrictions/Precautions   Weight Bearing Precautions Per Order Yes   RLE Weight Bearing Per Order NWB  (AKA)   Other Precautions Fall Risk;Multiple lines;Cognitive; Chair Alarm; Bed Alarm   General   Chart Reviewed Yes   Family/Caregiver Present No   Cognition   Overall Cognitive Status Impaired   Arousal/Participation Alert; Cooperative   Attention Within functional limits   Orientation Level Oriented X4   Bed Mobility   Rolling R 3  Moderate assistance   Additional items Assist x 1   Rolling L 3  Moderate assistance   Additional items Assist x 1   Supine to Sit 4  Minimal assistance   Additional items Assist x 1   Additional Comments sitting EOB at a S level   Transfers   Sliding Board transfer 3  Moderate assistance   Additional items Assist x 2   Balance   Static Sitting Fair   Dynamic Sitting Fair   Static Standing Fair -   Endurance Deficit   Endurance Deficit Yes   Activity Tolerance   Activity Tolerance Patient limited by fatigue   Medical Staff Made Aware OT   Nurse Made Aware nurse approved therapy session   Exercises   Quad Sets Sitting;Bilateral  (12 reps x 3 set s)   Assessment   Prognosis Good   Problem List Decreased strength;Decreased endurance; Impaired balance;Decreased mobility; Decreased cognition   Assessment Pt presents to therapy today with reduced mobility, high risk of falling, poor activity tolerance, reduced strength  These impairments limit the patient by requiring increased assistance for mobility and places him at risk of falling  Pt would benefit from continued skilled therapy while in the hospital to improve overall mobility and work towards a safe d/c  Recommend rehab  At end of session patient was left seated with call bell within reach  Chair alarm on  The patient's AM-PAC Basic Mobility Inpatient Short Form Low Function Raw Score 19 , Standardized Score is  31 06  A standardized score less 42 9 suggests the patient may benefit from discharge to post-acute rehab services   Please also refer to the recommendation of the Physical Therapist for safe discharge planning  Pt tolerated session well  Pt able to perform transfer board transfer with increased assistance  Pt educated about reducing shearing force of his bottom but completing clearing his bottom when performing the transfer  Pt also educated about using a cushion when sitting in the chair  PT very motivated and determined to get better  PT performed multiple rolls to assist wound care  PT tolerated session well    Barriers to Discharge Inaccessible home environment;Decreased caregiver support   Goals   STG Expiration Date 03/15/21   PT Treatment Day 1   Plan   Treatment/Interventions Functional transfer training;LE strengthening/ROM; Therapeutic exercise; Endurance training;Bed mobility;Gait training   Progress Progressing toward goals   PT Frequency Other (Comment)  (3-5xwk)   Recommendation   PT Discharge Recommendation Post-Acute Rehabilitation Services   PT - OK to Discharge Yes   Additional Comments [facility] to rehab   [facility]   Lying on Back to Sitting on Edge of Flat Bed 3   Moving Bed to Chair 2   Standing Up From Chair 2   Walk in Room 1   Climb 3-5 Stairs 1   Basic Mobility Inpatient Raw Score 11   Basic Mobility Standardized Score 30 25   Turning Head Towards Sound 4   Follow Simple Instructions 4   Low Function Basic Mobility Raw Score 19   Low Function Basic Mobility Standardized Score 31 06   Anselmo Way, Pt, DPT

## 2021-03-03 NOTE — QUICK NOTE
Vascular Surgery    Stump wound check      Pt s/p Right AKA 1/14  Healing well with some areas of thick scabbing  No necrosis or erythema  Staples and sutures removed today  Wound cleansed and dressed at bedside  Outpatient follow-up in the [facility]      Guillermo Edwards PA-C  3/3/2021

## 2021-03-03 NOTE — TRANSPORTATION MEDICAL NECESSITY
Section I - General Information    Name of Patient: Pauly Winchester                 : 1960    Medicare #: [id number]  Transport Date: 21 (PCS is valid for round trips on this date and for all repetitive trips in the 60-day range as noted below )  [facility]                                 Destination: [address]  Is the pt's stay covered under Medicare Part A (PPS/DRG)   []     Closest appropriate facility? If no, why is transport to more distant facility required? Yes  If hospice pt, is this transport related to pt's terminal illness? NA   Section II - Medical Necessity Questionnaire  Ambulance transportation is medically necessary only if other means of transport are contraindicated or would be potentially harmful to the patient  To meet this requirement, the patient must either be "bed confined" or suffer from a condition such that transport by means other than ambulance is contraindicated by the patient's condition  The following questions must be answered by the medical professional signing below for this form to be valid:    1)  Describe the MEDICAL CONDITION (physical and/or mental) of this patient AT [address] [place] [address] that requires the patient to be transported in an ambulance and why transport by other means is contraindicated by the patient's condition:L hydropneumothorax-evolved, Sacral DTI-2 residual wounds, R groin wound, skin tear R arm, pressure ulcer sacrum-mid, R limb pain, s/p AKA, non ambulatory, fall risk    2) Is the patient "bed confined" as defined below? Yes  To be "be confined" the patient must satisfy all three of the following conditions: (1) unable to get up from bed without Assistance; AND (2) unable to ambulate; AND (3) unable to sit in a chair or wheelchair      3) Can this patient safely be transported by car or wheelchair van (i e , seated during transport without a medical attendant or monitoring)? No    4) In addition to completing questions 1-3 above, please check any of the following conditions that apply*:   *Note: supporting documentation for any boxes checked must be maintained in the patient's medical records  If hosp-hosp transfer, describe services needed at 2nd facility not available at 1st facility? Moderate/severe pain on movement   Medical attendant required   Special handling/isolation/infection control precautions required   Unable to tolerate seated position for time needed to transport   Section III - Signature of Physician or Healthcare Professional  I certify that the above information is true and correct based on my evaluation of this patient, and represent that the patient requires transport by ambulance and that other forms of transport are contraindicated  I understand that this information will be used by the Centers for Medicare and Medicaid Services (CMS) to support the determination of medical necessity for ambulance services, and I represent that I have personal knowledge of the patient's condition at time of transport  []  If this box is checked, I also certify that the patient is physically or mentally incapable of signing the ambulance service's claim and that the institution with which I am affiliated has furnished care, services, or assistance to the patient  My signature below is made on behalf of the patient pursuant to 42 CFR §424 36(b)(4)  In accordance with 42 CFR §424 37, the specific reason(s) that the patient is physically or mentally incapable of signing the claim form is as follows:  Cathie Teixeira of Physician* or Healthcare Professional____________________________  Signature Date 03/03/21 (For scheduled repetitive transports, this form is not valid for transports performed more than 60 days after this date)    Printed Name & Credentials of Physician or Healthcare Professional (MD, DO, RN, etc )____SOCO Hastings________  *Form must be signed by patient's attending physician for scheduled, repetitive transports   For non-repetitive, unscheduled ambulance transports, if unable to obtain the signature of the attending physician, any of the following may sign (choose appropriate option below)  [] Physician Assistant []  Clinical Nurse Specialist []  Registered Nurse  []  Nurse Practitioner  [x] Discharge Planner

## 2021-03-03 NOTE — DISCHARGE INSTRUCTIONS
CBC with dif and CMP weekly while on Micafungin  D/C PICC line after micafungin complete 3/12/21    Skin care Plan:   1-Cleanse sacro-buttocks with soap and water  Apply maxorb to open areas and cover with Allevyn foam  Jey with T for treatment  Change every three days and PRN  check skin q-shift  2-Turn/reposition q2h or when medically stable for pressure re-distribution on skin   3-Elevate heel to offload pressure   4-Moisturize skin daily with skin nourishing cream   5-Ehob cushion in chair when out of bed  6-Hydraguard to heel BID and PRN

## 2021-03-03 NOTE — UTILIZATION REVIEW
Continued Stay Review    Date:   3/3                          Current Patient Class:  IP   Current Level of Care:  Ms     HPI:60 y o  male initially admitted on   2/26   For  Acute  resp failure   (baseline oxygen requirement was room air PTA) 2/2  Bronchiectasis w/acute exacerbation      2/27  Pulmonary  Start duoneb q 6; budesonide; mucinex; ck procal & sputum cx;  Cont IV Micafungin  HOLD off abx for now      2/28  Continues  To require increasing supplemental oxygen   Will cont duo nebs q 6 hrs, continuous pulse oximetry  3/1   Pulmonology:    CT scan which shows significant fibrotic lung disease certainly worse when compared to his 2018 CT scan  I do believe this is fibrotic ARDS in addition I reviewed his sputum culture which is growing Gram-negative rods  I recommend given his leukocytosis and sputum treating with  IV Zosyn which will initiate  Will also attempt to start therapy with anti fibrotic  Hydropneumothorax is   almost resolved on CT scan    3/2  OFF OXYGEN today :  Seem to be improving on IVAB,  Will possibly transition to Augmentin po assuming susceptibilities allow to finish a course of 7 days  With regards to his progressive fibrosis I do think he should trial on anti fibrotic at least for 1-3 months to prevent 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  3/3  Patient continues to complain of weakness and cough  Afebrile white count improved to baseline  Continue antibiotics transition to Augmentin twice a day initiate pirfenidone once approved in made available to patient  Continue inhalers and Mucinex and encourage mobility         Pertinent Labs/Diagnostic Results:   2/26  CT chest -  No  PE   decreasing size of loculated left hydropneumothorax following removal of removal of pigtail drainage catheter  Trace right hydropneumothorax    Comparing to prior exams increasing architectural distortion, bronchiectasis and suspected honeycombing throughout the bilateral lung bases consistent with fibrotic changes following patient's prior Covid pneumonia or alternatively an underlying  interstitial lung disease       Pertinent Labs/Diagnostic Results:   Results from last 7 days   Lab Units 02/25/21  1458   SARS-COV-2  Negative     Results from last 7 days   Lab Units 03/03/21  0518 03/02/21  0456 02/27/21  0612 02/26/21  1628 02/25/21  0600   WBC Thousand/uL 9 82 9 84 10 46* 20 01* 11 03*   HEMOGLOBIN g/dL 8 2* 8 2* 8 1* 9 2* 9 3*   HEMATOCRIT % 26 9* 27 2* 26 9* 30 3* 31 4*   PLATELETS Thousands/uL 245 253 272 393* 388   NEUTROS ABS Thousands/µL 8 05* 8 33*  --  19 14* 9 14*         Results from last 7 days   Lab Units 03/03/21  0519 03/02/21  0456 02/27/21  0612 02/26/21  1628 02/25/21  0600   SODIUM mmol/L 133* 136 139 132* 144   POTASSIUM mmol/L 4 3 4 1 3 8 4 8 3 0*   CHLORIDE mmol/L 101 102 108 102 112*   CO2 mmol/L 29 30 26 28 26   ANION GAP mmol/L 3* 4 5 2* 6   BUN mg/dL 11 10 15 17 13   CREATININE mg/dL 0 50* 0 53* 0 44* 0 64 0 33*   EGFR ml/min/1 73sq m 116 113 122 105 138   CALCIUM mg/dL 8 7 8 9 8 6 8 7 7 4*     Results from last 7 days   Lab Units 02/26/21  1628 02/25/21  0600   AST U/L 11 12   ALT U/L 8* 8*   ALK PHOS U/L 114 96   TOTAL PROTEIN g/dL 8 0 6 8   ALBUMIN g/dL 1 5* 1 3*   TOTAL BILIRUBIN mg/dL 0 39 0 29     Results from last 7 days   Lab Units 03/02/21  2106 03/02/21  1602 03/02/21  1056 03/01/21 2123 03/01/21  1715 03/01/21  0512 03/01/21  0013 02/28/21 2125 02/28/21  1553 02/28/21  1048 02/27/21  2111 02/27/21  1625   POC GLUCOSE mg/dl 251* 218* 200* 241* 290* 211* 262* 229* 199* 325* 293* 213*     Results from last 7 days   Lab Units 03/03/21  0519 03/02/21  0456 02/27/21  0612 02/26/21  1628 02/25/21  0600   GLUCOSE RANDOM mg/dL 218* 245* 161* 282* 135         Results from last 7 days   Lab Units 02/26/21  0630   HEMOGLOBIN A1C % 5 3   EAG mg/dL 105     BETA-HYDROXYBUTYRATE   Date Value Ref Range Status   01/11/2021 6 2 (H) <0 6 mmol/L Final      Results from last 7 days   Lab Units 02/26/21  1628   PROTIME seconds 29 1*   INR  2 77*   PTT seconds 62*         Results from last 7 days   Lab Units 03/03/21  0519 03/02/21  0456   PROCALCITONIN ng/ml 0 21 0 21     Results from last 7 days   Lab Units 02/25/21  1458   INFLUENZA A PCR  Negative   INFLUENZA B PCR  Negative   RSV PCR  Negative     Results from last 7 days   Lab Units 02/28/21  0837   SPUTUM CULTURE  4+ Growth of Escherichia coli*   GRAM STAIN RESULT  3+ Polys*  4+ Gram negative rods*  No Epithelial cells seen*       Vital Signs:   03/03/21 15:02:50  98 2 °F (36 8 °C)  84  116/68     Medications:   Scheduled Medications:  amoxicillin-clavulanate, 1 tablet, Oral, Q12H LIZETT  apixaban, 5 mg, Oral, BID  budesonide, 0 5 mg, Nebulization, Q12H  cholecalciferol, 2,000 Units, Oral, Daily  formoterol, 20 mcg, Nebulization, Q12H  glycopyrrolate, 1 mg, Oral, TID  guaiFENesin, 600 mg, Oral, Q12H LIZETT  insulin glargine, 15 Units, Subcutaneous, HS  insulin lispro, 5 Units, Subcutaneous, TID With Meals  ipratropium, 0 5 mg, Nebulization, Q6H  levalbuterol, 1 25 mg, Nebulization, Q6H  lisinopril, 10 mg, Oral, Daily  methadone, 70 mg, Oral, Daily  micafungin (MYCAMINE) 100 mL IVPB, 100 mg, Intravenous, Q24H  multivitamin-minerals, 1 tablet, Oral, Daily  polyethylene glycol, 17 g, Oral, Daily      Continuous IV Infusions:     PRN Meds:  acetaminophen, 650 mg, Oral, Q4H PRN  albuterol, 2 puff, Inhalation, Q4H PRN  bisacodyl, 10 mg, Rectal, Daily PRN  Lidocaine Viscous HCl, 15 mL, Swish & Spit, 4x Daily PRN  menthol-methyl salicylate, , Apply externally, 4x Daily PRN  ondansetron, 4 mg, Intravenous, Q6H PRN  oxyCODONE, 2 5 mg, Oral, Q6H PRN        Discharge Plan: d     Network Utilization Review Department  ATTENTION: Please call with any questions or concerns to [phone] and carefully listen to the prompts so that you are directed to the right person   All voicemails are confidential   Eros Loya all requests for admission clinical reviews, approved or denied determinations and any other requests to dedicated fax number below belonging to the campus where the patient is receiving treatment   List of dedicated fax numbers for the Facilities:  [address] [place] [address] DENIALS (Administrative/Medical Necessity) [phone]   [address] (Maternity/NICU/Pediatrics) [phone]   [address] Malia Robles 3847 (  [facility]) [address] [place] [address] Angeline Roxanne Almeida [address] [place], [place] [address] [phone]

## 2021-03-03 NOTE — ASSESSMENT & PLAN NOTE
Lab Results   Component Value Date    HGBA1C 5 3 02/26/2021       Recent Labs     03/01/21  2123 03/02/21  1056 03/02/21  1602 03/02/21  2106   POCGLU 241* 200* 218* 251*       Blood Sugar Average: Last 72 hrs:  · (P) 488 [id number]       · Continue Lantus, and Humalog t i d   With meals  · Increased Lantus to 15 units qhs given persistent hyperglycemia   · Titrate insulin dose based on Accu-Cheks  · Avoid hypoglycemia  · Hypoglycemia protocol in place

## 2021-03-03 NOTE — PLAN OF CARE
Problem: Potential for Falls  Goal: Patient will remain free of falls  Description: INTERVENTIONS:  - Assess patient frequently for physical needs  -  Identify cognitive and physical deficits and behaviors that affect risk of falls    -  [place] fall precautions as indicated by assessment   - Educate patient/family on patient safety including physical limitations  - Instruct patient to call for assistance with activity based on assessment  - Modify environment to reduce risk of injury  - Consider OT/PT consult to assist with strengthening/mobility  Outcome: Progressing     Problem: Prexisting or High Potential for Compromised Skin Integrity  Goal: Skin integrity is maintained or improved  Description: INTERVENTIONS:  - Identify patients at risk for skin breakdown  - Assess and monitor skin integrity  - Assess and monitor nutrition and hydration status  - Monitor labs   - Assess for incontinence   - Turn and reposition patient  - Assist with mobility/ambulation  - Relieve pressure over bony prominences  - Avoid friction and shearing  - Provide appropriate hygiene as needed including keeping skin clean and dry  - Evaluate need for skin moisturizer/barrier cream  - Collaborate with interdisciplinary team   - Patient/family teaching  - Consider wound care consult   Outcome: Progressing     Problem: PAIN - ADULT  Goal: Verbalizes/displays adequate comfort level or baseline comfort level  Description: Interventions:  - Encourage patient to monitor pain and request assistance  - Assess pain using appropriate pain scale  - Administer analgesics based on type and severity of pain and evaluate response  - Implement non-pharmacological measures as appropriate and evaluate response  - Consider cultural and social influences on pain and pain management  - Notify physician/advanced practitioner if interventions unsuccessful or patient reports new pain  Outcome: Progressing     Problem: INFECTION - ADULT  Goal: Absence or prevention of progression during hospitalization  Description: INTERVENTIONS:  - Assess and monitor for signs and symptoms of infection  - Monitor lab/diagnostic results  - Monitor all insertion sites, i e  indwelling lines, tubes, and drains  - Monitor endotracheal if appropriate and nasal secretions for changes in amount and color  - [place] appropriate cooling/warming therapies per order  - Administer medications as ordered  - Instruct and encourage patient and family to use good hand hygiene technique  - Identify and instruct in appropriate isolation precautions for identified infection/condition  Outcome: Progressing     Problem: SAFETY ADULT  Goal: Patient will remain free of falls  Description: INTERVENTIONS:  - Assess patient frequently for physical needs  -  Identify cognitive and physical deficits and behaviors that affect risk of falls    -  [place] fall precautions as indicated by assessment   - Educate patient/family on patient safety including physical limitations  - Instruct patient to call for assistance with activity based on assessment  - Modify environment to reduce risk of injury  - Consider OT/PT consult to assist with strengthening/mobility  Outcome: Progressing  Goal: Maintain or return to baseline ADL function  Description: INTERVENTIONS:  -  Assess patient's ability to carry out ADLs; assess patient's baseline for ADL function and identify physical deficits which impact ability to perform ADLs (bathing, care of mouth/teeth, toileting, grooming, dressing, etc )  - Assess/evaluate cause of self-care deficits   - Assess range of motion  - Assess patient's mobility; develop plan if impaired  - Assess patient's need for assistive devices and provide as appropriate  - Encourage maximum independence but intervene and supervise when necessary  - Involve family in performance of ADLs  - Assess for home care needs following discharge   - Consider OT consult to assist with ADL evaluation and planning for discharge  - Provide patient education as appropriate  Outcome: Progressing  Goal: Maintain or return mobility status to optimal level  Description: INTERVENTIONS:  - Assess patient's baseline mobility status (ambulation, transfers, stairs, etc )    - Identify cognitive and physical deficits and behaviors that affect mobility  - Identify mobility aids required to assist with transfers and/or ambulation (gait belt, sit-to-stand, lift, walker, cane, etc )  - [place] fall precautions as indicated by assessment  - Record patient progress and toleration of activity level on Mobility SBAR; progress patient to next Phase/Stage  - Instruct patient to call for assistance with activity based on assessment  - Consider rehabilitation consult to assist with strengthening/weightbearing, etc   Outcome: Progressing     Problem: DISCHARGE PLANNING  Goal: Discharge to home or other facility with appropriate resources  Description: INTERVENTIONS:  - Identify barriers to discharge w/patient and caregiver  - Arrange for needed discharge resources and transportation as appropriate  - Identify discharge learning needs (meds, wound care, etc )  - Arrange for interpretive services to assist at discharge as needed  - Refer to Case Management Department for coordinating discharge planning if the patient needs post-hospital services based on physician/advanced practitioner order or complex needs related to functional status, cognitive ability, or social support system  Outcome: Progressing     Problem: Knowledge Deficit  Goal: Patient/family/caregiver demonstrates understanding of disease process, treatment plan, medications, and discharge instructions  Description: Complete learning assessment and assess knowledge base    Interventions:  - Provide teaching at level of understanding  - Provide teaching via preferred learning methods  Outcome: Progressing     Problem: Nutrition/Hydration-ADULT  Goal: Nutrient/Hydration intake appropriate for improving, restoring or maintaining nutritional needs  Description: Monitor and assess patient's nutrition/hydration status for malnutrition  Collaborate with interdisciplinary team and initiate plan and interventions as ordered  Monitor patient's weight and dietary intake as ordered or per policy  Utilize nutrition screening tool and intervene as necessary  Determine patient's food preferences and provide high-protein, high-caloric foods as appropriate       INTERVENTIONS:  - Monitor oral intake, urinary output, labs, and treatment plans  - Assess nutrition and hydration status and recommend course of action  - Evaluate amount of meals eaten  - Assist patient with eating if necessary   - Allow adequate time for meals  - Recommend/ encourage appropriate diets, oral nutritional supplements, and vitamin/mineral supplements  - Order, calculate, and assess calorie counts as needed  - Recommend, monitor, and adjust tube feedings and TPN/PPN based on assessed needs  - Assess need for intravenous fluids  - Provide specific nutrition/hydration education as appropriate  - Include patient/family/caregiver in decisions related to nutrition  Outcome: Progressing     Problem: RESPIRATORY - ADULT  Goal: Achieves optimal ventilation and oxygenation  Description: INTERVENTIONS:  - Assess for changes in respiratory status  - Assess for changes in mentation and behavior  - Position to facilitate oxygenation and minimize respiratory effort  - Oxygen administered by appropriate delivery if ordered  - Initiate smoking cessation education as indicated  - Encourage broncho-pulmonary hygiene including cough, deep breathe, Incentive Spirometry  - Assess the need for suctioning and aspirate as needed  - Assess and instruct to report SOB or any respiratory difficulty  - Respiratory Therapy support as indicated  Outcome: Progressing     Problem: SKIN/TISSUE INTEGRITY - ADULT  Goal: Skin integrity remains intact  Description: INTERVENTIONS  - Identify patients at risk for skin breakdown  - Assess and monitor skin integrity  - Assess and monitor nutrition and hydration status  - Monitor labs (i e  albumin)  - Assess for incontinence   - Turn and reposition patient  - Assist with mobility/ambulation  - Relieve pressure over bony prominences  - Avoid friction and shearing  - Provide appropriate hygiene as needed including keeping skin clean and dry  - Evaluate need for skin moisturizer/barrier cream  - Collaborate with interdisciplinary team (i e  Nutrition, Rehabilitation, etc )   - Patient/family teaching  Outcome: Progressing  Goal: Incision(s), wounds(s) or drain site(s) healing without S/S of infection  Description: INTERVENTIONS  - Assess and document risk factors for skin impairment   - Assess and document dressing, incision, wound bed, drain sites and surrounding tissue  - Consider nutrition services referral as needed  - Oral mucous membranes remain intact  - Provide patient/ family education  Outcome: Progressing     Problem: MUSCULOSKELETAL - ADULT  Goal: Maintain or return mobility to safest level of function  Description: INTERVENTIONS:  - Assess patient's ability to carry out ADLs; assess patient's baseline for ADL function and identify physical deficits which impact ability to perform ADLs (bathing, care of mouth/teeth, toileting, grooming, dressing, etc )  - Assess/evaluate cause of self-care deficits   - Assess range of motion  - Assess patient's mobility  - Assess patient's need for assistive devices and provide as appropriate  - Encourage maximum independence but intervene and supervise when necessary  - Involve family in performance of ADLs  - Assess for home care needs following discharge   - Consider OT consult to assist with ADL evaluation and planning for discharge  - Provide patient education as appropriate  Outcome: Progressing  Goal: Maintain proper alignment of affected body part  Description: INTERVENTIONS:  - Support, maintain and protect limb and body alignment  - Provide patient/ family with appropriate education  Outcome: Progressing

## 2021-03-03 NOTE — DISCHARGE INSTR - OTHER ORDERS
Skin care Plan:  1-Cleanse sacro-buttocks with soap and water  Apply maxorb to open areas and cover with Allevyn foam  Jey with T for treatment  Change every three days and PRN  check skin q-shift  2-Turn/reposition q2h or when medically stable for pressure re-distribution on skin   3-Elevate heel to offload pressure  4-Moisturize skin daily with skin nourishing cream  5-Ehob cushion in chair when out of bed  6-Hydraguard to heel BID and PRN

## 2021-03-03 NOTE — DISCHARGE SUMMARY
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  · Sputum culture with gram negative rods, started on IV Zosyn for coverage of HCAP vs aspiration   · transitioned to PO Augmentin today to complete antibiotic course  Antibiotic day 3/7  · Pulm attempting to start anti fibrotic pirfenidone 801mg TID  Needs to be prior authorized through insurance  Pulmonary office will work on this  OK for patient to go to rehab while this is being done  · Weaned to room air  · Pulmonary following    Empyema lung ([facility] [address] )  Assessment & Plan  · Left-sided candidal empyema  · Per ID:  · Continue micafungin through 03/12/2021  · May eventually need decortication  · Recheck CBC with diff and CMP at least weekly while on treatment    Left Hydropneumothorax  Assessment & Plan  · Improving as per CTA PE study 2/26  · S/p prior pigtail, has been removed  · Pulmonology following    Methadone maintenance therapy patient [facility])  Assessment & Plan  · On chronic methadone 70 mg daily    Type 2 diabetes mellitus, with long-term current use of insulin [facility])  Assessment & Plan  Lab Results   Component Value Date    HGBA1C 5 3 02/26/2021       Recent Labs     03/01/21  2123 03/02/21  1056 03/02/21  1602 03/02/21  2106   POCGLU 241* 200* 218* 251*       Blood Sugar Average: Last 72 hrs:  · (P) 444 [id number]       · Continue Lantus, and Humalog t i d   With meals  · Increased Lantus to 15 units qhs given persistent hyperglycemia   · Titrate insulin dose based on Accu-Cheks  · Avoid hypoglycemia  · Hypoglycemia protocol in place    Muscle weakness of right upper extremity  Assessment & Plan  · Pt reports to persistent weakness of RUE, though admits it is getting better  · Seen by neurology last admission following stroke alert due to RUE weakness, MRI deferred at that time, CT head with no acute stroke  · Felt due to acute illness  · Continue PT    Ischemia of right lower extremity with suspected rhabdomyolysis  Assessment & Plan  · 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  · Continue Eliquis - transition from 10 mg BID to 5 mg BID     Right femoral vein DVT ([address] )  Assessment & Plan  · Continue Eliquis    Stage II pressure ulcer of sacral region [facility])  Assessment & Plan  · Encourage frequent repositioning  · Local wound care        Discharging Physician / Practitioner: Joshua Douglas PA-C  PCP: Crista Schlatter, [address] [place] [address]  Admission Date:   Admission Orders (From admission, onward)     Ordered        02/28/21 1206  Inpatient Admission  Once         02/26/21 2309  Place in Observation  Once                   Discharge Date: 03/03/21    Disposition:      Short Term Rehab or SNF at  [address] to [facility] SNF:   · Not Applicable to this Patient - Not Applicable to this Patient    Reason for Admission: shortness of breath and hypoxia    Discharge Diagnoses:     Please see assessment and plan section above for further details regarding discharge diagnoses       Resolved Problems  Date Reviewed: 3/3/2021    None          Consultations During Hospital Stay:  · Dr Chhaya Cavanaugh    Procedures Performed:      CTA chest - Decreasing size of loculated left hydropneumothorax following removal of removal of pigtail drainage catheter      Trace right hydropneumothorax      Comparing to prior exams increasing architectural distortion, bronchiectasis and suspected honeycombing throughout the bilateral lung bases consistent with fibrotic changes following patient's prior Covid pneumonia or alternatively an underlying   interstitial lung disease    LE doppler - normal    Medication Adjustments and Discharge Medications:  · Summary of Medication Adjustments made as a result of this hospitalization: Augmentin x 4 more days, Micofungin through 3/12, lasix and KCL discontinued  · Medication Dosing Tapers - Please refer to Discharge Medication List for details on any medication dosing tapers (if applicable to patient)  · Medications being temporarily held (include recommended restart time):   · Discharge Medication List: See after visit summary for reconciled discharge medications  Wound Care Recommendations:  When applicable, please see wound care section of After Visit Summary  Diet Recommendations at Discharge:  Diet -        Diet Orders   (From admission, onward)             Start     Ordered    02/28/21 1711  Dietary nutrition supplements  Once     Question Answer Comment   Select Supplement: Glucerna-Strawberry    Frequency Lunch        02/28/21 1711    02/28/21 1711  Dietary nutrition supplements  Once     Question Answer Comment   Select Supplement: Geradine Sheerer    Frequency Dinner        02/28/21 1711    02/27/21 0435  Diet [place]/CHO Controlled; Consistent Carbohydrate Diet Level 2 (5 carb servings/75 grams CHO/meal)  Diet effective now     Question Answer Comment   Diet Type [place]/CHO Controlled    [place]/CHO Controlled Consistent Carbohydrate Diet Level 2 (5 carb servings/75 grams CHO/meal)    RD to adjust diet per protocol? Yes        02/27/21 0434                Instructions for any Catheters / Lines Present at Discharge (including removal date, if applicable): PICC line   Discontinue 3/12/21 after Micafungin complete    Significant Findings / Test Results:   · See above    Incidental Findings:   · none     Test Results Pending at Discharge (will require follow up):   · none     Outpatient Tests Requested:  · CBC with dif and CMP weekly while on Micafungin    Complications:  none    Hospital Course:     Dustin Bruce is a 56 y o  male patient who originally presented to the hospital on 2/26/2021 due to  Worsening shortness of breath and hypoxia  Patient had just been discharged after a complex hospital stay with COVID infection requiring intubation, urgent right AKA for lower extremity ischemia, and a fungal left lung empyema  Patient presented back to the hospital with increasing shortness of breath and hypoxia  He was seen in consultation by Pulmonary   Patient was found have acute hypoxic respiratory failure secondary to E coli pneumonia with superimposed worsening fibrosis  He was started on IV Zosyn with improvement  He will be transitioned to oral Augmentin to complete a 7 day course of antibiotics  Patient's CT scan showed worsening bronchiectasis and honeycombing  Pulmonary would like to start pirfenidone  801 mg oral t i d  Unfortunately, this is cost prohibitive  The medication will need to be preauthorized with insurance  Pulmonary will work on this as an outpatient and felt that the patient was stable to go to rehab while this is in process  Patient will return to rehab in stable condition  His oxygen saturations are stable on room air  Patient will need to continue micafungin through 3/12 as per previous ID recommendations  He should get a CBC with diff and CMP weekly while on this therapy  His PICC line can be removed once therapy is complete  Condition at Discharge: good     Discharge Day Visit / Exam:     Subjective:  Feels better overall  Still has periods where it is difficult to expectorate sputum     Vitals: Blood Pressure: 130/75 (03/03/21 0654)  Pulse: 95 (03/02/21 2107)  Temperature: 98 1 °F (36 7 °C) (03/03/21 0654)  Temp Source: Oral (02/26/21 1603)  Respirations: 16 (02/27/21 0200)  Height: 5' 6" (167 6 cm) (02/28/21 1655)  Weight - Scale: 66 kg (145 lb 8 1 oz) (02/28/21 1649)  SpO2: 99 %(during tx) (03/03/21 8837)  Exam:   Physical Exam  Constitutional:       Comments: thin   HENT:      Head: Normocephalic and atraumatic  Cardiovascular:      Rate and Rhythm: Normal rate and regular rhythm  Heart sounds: No murmur  Pulmonary:      Effort: Pulmonary effort is normal       Comments: Scattered coarse breath sounds  Abdominal:      General: Bowel sounds are normal       Palpations: Abdomen is soft  Tenderness: There is no abdominal tenderness  Neurological:      Mental Status: He is alert and oriented to person, place, and time  Mental status is at baseline  Psychiatric:         Mood and Affect: Mood normal          Discussion with Family: message left for nephew with update    Goals of Care Discussions:  · Code Status at Discharge: Level 1 - Full Code  · Were there any Goals of Care Discussions during Hospitalization?: No  · Results of any General Goals of Care Discussions:    · POLST Completed: No   · If POLST Completed, Summary of POLST Agreement Provided Here:    · OK to Rehospitalize if Needed? Yes    Discharge instructions/Information to patient and family:   See after visit summary section titled Discharge Instructions for information provided to patient and family  Planned Readmission: none      Discharge Statement:  I spent 50 minutes discharging the patient  This time was spent on the day of discharge  I had direct contact with the patient on the day of discharge  Greater than 50% of the total time was spent examining patient, answering all patient questions, arranging and discussing plan of care with patient as well as directly providing post-discharge instructions  Additional time then spent on discharge activities      ** Please Note: This note has been constructed using a voice recognition system **

## 2021-03-03 NOTE — RESTORATIVE TECHNICIAN NOTE
Restorative Specialist Mobility Note       Activity: Chair, Dangle, Stand at bedside(Educated/encouraged pt to get In/OOB to the chair with assistance  Bed alarm on   Pt callbell, phone/tray within reach )     Assistive Device: Other (Comment)(HHA x2 stand-pivot back to bed )       Harpreet GUALLPA, Restorative Technician, [employer]

## 2021-03-03 NOTE — CASE MANAGEMENT
CM informed pt stable [facility]  CM left a message for Tad Rahman) and a message in [employer] confirming  CM tentatively set up BLS transport via [facility] for 5pm p/u due to distance  CM informed PA-C  Once rehab confirms, CM will reach out to RN and family  CM received call from [place] at [facility]  She informed CM to keep the 5pm for now, she needs to submit for auth    CM TT PT/OT for updated notes, but sent [place] notes from 3/1 and updated progress notes to f: [phone]

## 2021-03-03 NOTE — OCCUPATIONAL THERAPY NOTE
[address] Progress Note     Patient Name: Pauly Silva  VHSXS'V Date: 3/3/2021  Problem List  Principal Problem:    Acute respiratory failure with hypoxia [facility])  Active Problems:    Methadone maintenance therapy patient ([address] )    Type 2 diabetes mellitus, with long-term current use of insulin (HCC)    Muscle weakness of right upper extremity    Ischemia of right lower extremity with suspected rhabdomyolysis    Right femoral vein DVT (HCC)    Left Hydropneumothorax    Empyema lung (HCC)    Stage II pressure ulcer of sacral region [facility])          03/03/21 1150   OT Last Visit   OT Visit Date 03/03/21   Note Type   Note Type Treatment for insurance authorization   Restrictions/Precautions   Weight Bearing Precautions Per Order Yes   RLE Weight Bearing Per Order NWB  (R AKA)   Other Precautions WBS; Fall Risk;Pain   General   Response to Previous Treatment Patient with no complaints from previous session   Lifestyle   Autonomy pt has not been home since early [place] when he was admitted to acute care with COVID 19, requiring intubation, had a R AKA  from acute care he was dc'd on 2/25 to acute rehab  only there for one day, back to acute care with hypoxia   Reciprocal Relationships supportive family   Intrinsic Gratification enjoys watching [employer]   Pain Assessment   Pain Assessment Tool Pain Assessment not indicated - pt denies pain   Pain Score No Pain   ADL   Where Assessed Edge of bed   Grooming Assistance 4  Minimal Assistance   Grooming Deficit Brushing hair   Grooming Comments washing hair w/ shower cap and brushing, some [place] for R hand to maintain grasp on comb   UB Bathing Comments reports he bathed earlier w/ less assistance required than previous days   Toileting Assistance  2  Maximal Assistance   Toileting Deficit Perineal hygiene   Toileting Comments victor hugo care before wound care placed new patch   Bed Mobility   Rolling R 3  Moderate assistance   Additional items Assist x 1; Increased time required;Verbal cues   Rolling L 3  Moderate assistance   Additional items Assist x 1; Increased time required;Verbal cues   Supine to Sit 4  Minimal assistance   Additional items Assist x 1; Increased time required;Verbal cues;LE management   Transfers   Sliding Board transfer 3  Moderate assistance   Additional items Assist x 2; Increased time required;Verbal cues  (cues to prevent shearing of butt wound)   Cognition   Overall Cognitive Status Impaired   Arousal/Participation Cooperative   Attention Attends with cues to redirect   Orientation Level Oriented X4   Memory Decreased recall of precautions   Following Commands Follows multistep commands with increased time or repetition   Comments pt very pleasant and cooperative, agreeable to therapy   Activity Tolerance   Activity Tolerance Patient limited by fatigue   Medical Staff Made Aware PT Anna   Assessment   Assessment Patient participated in Skilled OT session this date with interventions consisting of ADL re training with the use of correct body mechnaics, Energy Conservation techniques, safety awareness and fall prevention techniques and  therapeutic activities to: increase activity tolerance   Patient agreeable to OT treatment session, upon arrival patient was found supine in bed  In comparison to previous session, patient with improvements in EOB sitting balance and use of R hand*   Patient requiring frequent rest periods and ocassional safety reminders  Patient continues to be functioning below baseline level, occupational performance remains limited secondary to factors listed above and increased risk for falls and injury  From OT standpoint, recommendation at time of d/c would be Short Term Rehab  Patient to benefit from continued Occupational Therapy treatment while in the hospital to address deficits as defined above and maximize level of functional independence with ADLs and functional mobility   The patient's raw score on the AM-PAC Daily Activity inpatient short form is 15, standardized score is 34 69, less than 39 4  Patients at this level are likely to benefit from DC to post-acute rehabilitation services  Please refer to the recommendation of the Occupational Therapist for safe DC planning  Plan   Treatment Interventions ADL retraining;Functional transfer training; Endurance training   Goal Expiration Date 03/15/21   OT Treatment Day 1   OT Frequency 3-5x/wk   Recommendation   OT Discharge Recommendation Post-Acute Rehabilitation Services   OT - OK to Discharge Yes   AM-PAC Daily Activity Inpatient   Lower Body Dressing 2   Bathing 2   Toileting 2   Upper Body Dressing 3   Grooming 3   Eating 3   Daily Activity Raw Score 15   Daily Activity Standardized Score (Calc for Raw Score >=11) 34 69   AM-PAC Applied Cognition Inpatient   Following a Speech/Presentation 3   Understanding Ordinary Conversation 4   Taking Medications 3   Remembering Where Things Are Placed or Put Away 3   Remembering List of 4-5 Errands 2   Taking Care of Complicated Tasks 2   Applied Cognition Raw Score 17   Applied Cognition Standardized Score 36 52   Modified Columbia Scale   Modified Columbia Scale 4     Jennifer White MS, OTR/L

## 2021-03-03 NOTE — CASE MANAGEMENT
CM reached out to [facility] from [facility] for f/u, as pt is arranged for 5pm transport  [facility] requested transport be arranged for tomorrow morning  CM arranged for 11:30am p/u with Maranda CASTRO  CM informed JOSE Kwok  CM will confirm again in the morning with [facility]

## 2021-03-03 NOTE — WOUND OSTOMY CARE
Consult Note - Wound   Theora Martine 56 y o  male MRN: [id number]  Unit/Bed#: -01 Encounter: [id number]      History and Present Illness:  Patient is seen for wound consult  Patient examined in his chair and able to turn with moderate assistance  Patient is continent  Assessment Findings:   1  POA fully evolved DTI on sacrum  Two areas measured as one        2  POA DTI on right hand between thumb and index finger        3  POA DTI on right lateral hand--healed        Left heel intact        See flowsheets for details      Skin care Plan:  1-Cleanse sacro-buttocks with soap and water  Apply maxorb to open areas and cover with Allevyn foam  Jey with T for treatment  Change every three days and PRN  check skin q-shift  2-Turn/reposition q2h or when medically stable for pressure re-distribution on skin   3-Elevate heel to offload pressure  4-Moisturize skin daily with skin nourishing cream  5-Ehob cushion in chair when out of bed  6-Hydraguard to heel BID and PRN  Call or tigertext with any questions  Wound Care will continue to follow    Wound 01/12/21 Pressure Injury Sacrum Mid (Active)   Wound Image   03/03/21 1147   Wound Description Fragile;Pink;Slough; Epithelialization;Granulation tissue; Yellow; White 03/03/21 1147   Pressure Injury Stage DTPI 03/03/21 1147   Renuka-wound Assessment Fragile;Clean;Dry; Intact; Pink 03/03/21 1147   Wound Length (cm) 7 cm 03/03/21 1147   Wound Width (cm) 5 cm 03/03/21 1147   Wound Depth (cm) 0 7 cm 03/03/21 1147   Wound Surface Area (cm^2) 35 cm^2 03/03/21 1147   Wound Volume (cm^3) 24 5 cm^3 03/03/21 1147   Calculated Wound Volume (cm^3) 24 5 cm^3 03/03/21 1147   Drainage Amount None 03/03/21 0722   Treatments Site care 02/28/21 0815   Dressing Calcium Alginate; Foam, Silicon (eg  Allevyn, etc) 03/03/21 1147   Dressing Status Clean;Dry; Intact 03/03/21 0722       Wound 02/03/21 Pressure Injury Finger (Comment which one) Posterior;Right (Active)   Wound Image 03/03/21 1151   Wound Description Clean;Dry; Intact; Epithelialization;Brown;Light purple 03/03/21 1151   Pressure Injury Stage DTPI 03/03/21 1151   Renuka-wound Assessment Clean;Dry; Intact 03/03/21 1151   Wound Length (cm) 1 cm 03/03/21 1151   Wound Width (cm) 1 cm 03/03/21 1151   Wound Surface Area (cm^2) 1 cm^2 03/03/21 1151   Dressing Open to air 03/03/21 1151

## 2021-03-03 NOTE — PLAN OF CARE
Problem: OCCUPATIONAL THERAPY ADULT  Goal: Performs self-care activities at highest level of function for planned discharge setting  See evaluation for individualized goals  Outcome: Progressing  Note: Limitation: Decreased ADL status, Decreased UE strength, Decreased Safe judgement during ADL, Decreased cognition, Decreased endurance, Decreased sensation, Decreased self-care trans, Decreased high-level ADLs     Assessment: Patient participated in Skilled OT session this date with interventions consisting of ADL re training with the use of correct body mechnaics, Energy Conservation techniques, safety awareness and fall prevention techniques and  therapeutic activities to: increase activity tolerance   Patient agreeable to OT treatment session, upon arrival patient was found supine in bed  In comparison to previous session, patient with improvements in EOB sitting balance and use of R hand*   Patient requiring frequent rest periods and ocassional safety reminders  Patient continues to be functioning below baseline level, occupational performance remains limited secondary to factors listed above and increased risk for falls and injury  From OT standpoint, recommendation at time of d/c would be Short Term Rehab  Patient to benefit from continued Occupational Therapy treatment while in the hospital to address deficits as defined above and maximize level of functional independence with ADLs and functional mobility  OT Discharge Recommendation: Post-Acute Rehabilitation Services  OT - OK to Discharge:  Yes

## 2021-03-04 ENCOUNTER — TELEPHONE (OUTPATIENT)
Dept: INFECTIOUS DISEASES | Facility: CLINIC | Age: 61
End: 2021-03-04

## 2021-03-04 ENCOUNTER — TRANSITIONAL CARE MANAGEMENT (OUTPATIENT)
Dept: FAMILY MEDICINE CLINIC | Facility: CLINIC | Age: 61
End: 2021-03-04

## 2021-03-04 VITALS
RESPIRATION RATE: 16 BRPM | HEART RATE: 93 BPM | WEIGHT: 145.5 LBS | BODY MASS INDEX: 23.38 KG/M2 | TEMPERATURE: 97.8 F | OXYGEN SATURATION: 95 % | DIASTOLIC BLOOD PRESSURE: 83 MMHG | SYSTOLIC BLOOD PRESSURE: 137 MMHG | HEIGHT: 66 IN

## 2021-03-04 DIAGNOSIS — J86.9 EMPYEMA LUNG (HCC): Primary | ICD-10-CM

## 2021-03-04 LAB
GLUCOSE SERPL-MCNC: 195 MG/DL (ref 65–140)
GLUCOSE SERPL-MCNC: 202 MG/DL (ref 65–140)
GLUCOSE SERPL-MCNC: 240 MG/DL (ref 65–140)
GLUCOSE SERPL-MCNC: 250 MG/DL (ref 65–140)
GLUCOSE SERPL-MCNC: 271 MG/DL (ref 65–140)
GLUCOSE SERPL-MCNC: 305 MG/DL (ref 65–140)

## 2021-03-04 PROCEDURE — 94640 AIRWAY INHALATION TREATMENT: CPT

## 2021-03-04 PROCEDURE — 82948 REAGENT STRIP/BLOOD GLUCOSE: CPT

## 2021-03-04 PROCEDURE — 99239 HOSP IP/OBS DSCHRG MGMT >30: CPT | Performed by: NURSE PRACTITIONER

## 2021-03-04 PROCEDURE — 99232 SBSQ HOSP IP/OBS MODERATE 35: CPT | Performed by: INTERNAL MEDICINE

## 2021-03-04 PROCEDURE — 94668 MNPJ CHEST WALL SBSQ: CPT

## 2021-03-04 PROCEDURE — 94760 N-INVAS EAR/PLS OXIMETRY 1: CPT

## 2021-03-04 RX ORDER — GUAIFENESIN 600 MG
1200 TABLET, EXTENDED RELEASE 12 HR ORAL EVERY 12 HOURS SCHEDULED
Status: DISCONTINUED | OUTPATIENT
Start: 2021-03-04 | End: 2021-03-04 | Stop reason: HOSPADM

## 2021-03-04 RX ORDER — GUAIFENESIN 1200 MG/1
1200 TABLET, EXTENDED RELEASE ORAL EVERY 12 HOURS SCHEDULED
Refills: 0
Start: 2021-03-04 | End: 2021-06-01

## 2021-03-04 RX ADMIN — OXYCODONE HYDROCHLORIDE 2.5 MG: 5 TABLET ORAL at 04:53

## 2021-03-04 RX ADMIN — Medication 2000 UNITS: at 08:03

## 2021-03-04 RX ADMIN — LISINOPRIL 10 MG: 10 TABLET ORAL at 08:03

## 2021-03-04 RX ADMIN — POLYETHYLENE GLYCOL 3350 17 G: 17 POWDER, FOR SOLUTION ORAL at 08:02

## 2021-03-04 RX ADMIN — INSULIN LISPRO 5 UNITS: 100 INJECTION, SOLUTION INTRAVENOUS; SUBCUTANEOUS at 11:36

## 2021-03-04 RX ADMIN — INSULIN LISPRO 5 UNITS: 100 INJECTION, SOLUTION INTRAVENOUS; SUBCUTANEOUS at 08:12

## 2021-03-04 RX ADMIN — IPRATROPIUM BROMIDE 0.5 MG: 0.5 SOLUTION RESPIRATORY (INHALATION) at 07:11

## 2021-03-04 RX ADMIN — AMOXICILLIN AND CLAVULANATE POTASSIUM 1 TABLET: 875; 125 TABLET, FILM COATED ORAL at 08:09

## 2021-03-04 RX ADMIN — IPRATROPIUM BROMIDE 0.5 MG: 0.5 SOLUTION RESPIRATORY (INHALATION) at 01:05

## 2021-03-04 RX ADMIN — METHADONE HYDROCHLORIDE 70 MG: 10 TABLET ORAL at 08:03

## 2021-03-04 RX ADMIN — APIXABAN 5 MG: 5 TABLET, FILM COATED ORAL at 08:04

## 2021-03-04 RX ADMIN — Medication 1 TABLET: at 08:03

## 2021-03-04 RX ADMIN — GUAIFENESIN 600 MG: 600 TABLET, EXTENDED RELEASE ORAL at 08:03

## 2021-03-04 RX ADMIN — OXYCODONE HYDROCHLORIDE 2.5 MG: 5 TABLET ORAL at 11:36

## 2021-03-04 RX ADMIN — LEVALBUTEROL HYDROCHLORIDE 1.25 MG: 1.25 SOLUTION, CONCENTRATE RESPIRATORY (INHALATION) at 01:05

## 2021-03-04 RX ADMIN — FORMOTEROL FUMARATE DIHYDRATE 20 MCG: 20 SOLUTION RESPIRATORY (INHALATION) at 07:12

## 2021-03-04 RX ADMIN — BUDESONIDE 0.5 MG: 0.5 INHALANT ORAL at 07:11

## 2021-03-04 RX ADMIN — LEVALBUTEROL HYDROCHLORIDE 1.25 MG: 1.25 SOLUTION, CONCENTRATE RESPIRATORY (INHALATION) at 07:11

## 2021-03-04 RX ADMIN — GLYCOPYRROLATE 1 MG: 1 TABLET ORAL at 08:08

## 2021-03-04 RX ADMIN — MICAFUNGIN SODIUM 100 MG: 50 INJECTION, POWDER, LYOPHILIZED, FOR SOLUTION INTRAVENOUS at 08:17

## 2021-03-04 NOTE — TELEPHONE ENCOUNTER
Called [employer] and spoke to [facility]  Informed that I was faxing weekly lab orders and d/c picc  Sent to fax #[phone]

## 2021-03-04 NOTE — DISCHARGE SUMMARY
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  · Sputum culture with gram negative rods, started on IV Zosyn for coverage of HCAP vs aspiration   · transitioned to PO Augmentin yesterday to complete antibiotic course  Antibiotic day 4/7  · Pulm attempting to start anti fibrotic pirfenidone 801mg TID  Needs to be prior authorized through insurance  · Pulmonary office will work on this  OK for patient to go to rehab while this is being done    · Weaned to room air  · Pulmonary following    Empyema lung (HCC)  Assessment & Plan  · Left-sided candidal empyema  · Per ID:  · Continue micafungin through 03/12/2021  · Left PICC line in place until completion of IV micafungin   · May eventually need decortication  · Recheck CBC with diff and CMP at least weekly while on treatment    Stage II pressure ulcer of sacral region [facility])  Assessment & Plan  · Encourage frequent repositioning  · Local wound care    Left Hydropneumothorax  Assessment & Plan  · Improving as per CTA PE study 2/26  · S/p prior pigtail, has been removed  · Pulmonology following    Right femoral vein DVT (HCC)  Assessment & Plan  · Continue Eliquis    Ischemia of right lower extremity with suspected rhabdomyolysis  Assessment & Plan  · 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  · Continue Eliquis - transition from 10 mg BID to 5 mg BID     Muscle weakness of right upper extremity  Assessment & Plan  · Pt reports to persistent weakness of RUE, though admits it is getting better  · Seen by neurology last admission following stroke alert due to RUE weakness, MRI deferred at that time, CT head with no acute stroke  · Felt due to acute illness  · Continue PT    Type 2 diabetes mellitus, with long-term current use of insulin [facility])  Assessment & Plan  Lab Results   Component Value Date    HGBA1C 5 3 02/26/2021       Recent Labs     03/03/21  0651 03/03/21  1128 03/03/21  1618 03/03/21  2059   POCGLU 250* 195* 202* 305*       Blood Sugar Average: Last 72 hrs:  · (P) 338 [id number]       · Continue Lantus, and Humalog t i d   With meals  · Increased Lantus to 15 units qhs given persistent hyperglycemia yesterday- monitor response after discharge   · Titrate insulin dose based on Accu-Cheks  · Avoid hypoglycemia    Methadone maintenance therapy patient [facility])  Assessment & Plan  · On chronic methadone 70 mg daily      Discharging Physician / Practitioner: VERONICA Navarro  PCP: Geo Plunkett [place]  Admission Date:   Admission Orders (From admission, onward)     Ordered        02/28/21 1206  Inpatient Admission  Once         02/26/21 2309  Place in Observation  Once                   Discharge Date: 03/04/21    Resolved Problems  Date Reviewed: 3/4/2021    None          Consultations During Hospital Stay:  · Pulmonology   · PT/OT     Procedures Performed:    CTA chest - Decreasing size of loculated left hydropneumothorax following removal of removal of pigtail drainage catheter      Trace right hydropneumothorax      Comparing to prior exams increasing architectural distortion, bronchiectasis and suspected honeycombing throughout the bilateral lung bases consistent with fibrotic changes following patient's prior Covid pneumonia or alternatively an underlying   interstitial lung disease     LE doppler - normal    Medication Adjustments and Discharge Medications:  · Summary of Medication Adjustments made as a result of this hospitalization: Augmentin x 4 more days, Micofungin through 3/12, lasix and KCL discontinued  · Medication Dosing Tapers - Please refer to Discharge Medication List for details on any medication dosing tapers (if applicable to patient)  · Medications being temporarily held (include recommended restart time):   · Discharge Medication List: See after visit summary for reconciled discharge medications       Instructions for any Catheters / Lines Present at Discharge (including removal date, if applicable):   · PICC line  Discontinue 3/12/21 after Micafungin complete     Incidental Findings:   · None     Test Results Pending at Discharge (will require follow up): · None      Outpatient Tests Requested:  · Cbc with diff and CMP weekly while on micafungin     Complications:  none    Reason for Admission: Shortness of breath and hypoxia    Hospital Course:     Pauly Winchester is a 56 y o  male patient who originally presented to the hospital on 2/26/2021 due to  Worsening shortness of breath and hypoxia  Patient had just been discharged after a complex hospital stay with COVID infection requiring intubation, urgent right AKA for lower extremity ischemia, and a fungal left lung empyema  Patient presented back to the hospital with increasing shortness of breath and hypoxia  He was seen in consultation by Pulmonary   Patient was found have acute hypoxic respiratory failure secondary to E coli pneumonia with superimposed worsening fibrosis  He was started on IV Zosyn with improvement  He will be transitioned to oral Augmentin to complete a 7 day course of antibiotics  Patient's CT scan showed worsening bronchiectasis and honeycombing  Pulmonary would like to start pirfenidone  801 mg oral t i d  Unfortunately, this is cost prohibitive  The medication will need to be preauthorized with insurance  Pulmonary will work on this as an outpatient and felt that the patient was stable to go to rehab while this is in process  Patient will return to rehab in stable condition  His oxygen saturations are stable on room air       Patient will need to continue micafungin through 3/12 as per previous ID recommendations  He should get a CBC with diff and CMP weekly while on this therapy  His PICC line can be removed once therapy is complete  Please see above list of diagnoses and related plan for additional information  Condition at Discharge: good     Discharge Day Visit / Exam:     Subjective:  Pt is worried about going to STR today, reports he is afraid they will push him too hard  Reports occasional SOB and constipation but denies any other complaints  Vitals: Blood Pressure: 137/83 (03/04/21 0744)  Pulse: 93 (03/04/21 0744)  Temperature: 97 8 °F (36 6 °C) (03/04/21 0744)  Temp Source: Oral (03/03/21 2028)  Respirations: 16 (03/03/21 2028)  Height: 5' 6" (167 6 cm) (02/28/21 1655)  Weight - Scale: 66 kg (145 lb 8 1 oz) (02/28/21 1649)  SpO2: 95 % (03/04/21 0744)  Exam:   Physical Exam  Vitals signs and nursing note reviewed  Constitutional:       Comments: thin   Cardiovascular:      Rate and Rhythm: Normal rate and regular rhythm  Heart sounds: Normal heart sounds  No murmur  Pulmonary:      Effort: Pulmonary effort is normal  No respiratory distress  Comments: Scattered coarse breath sounds b/l  Abdominal:      General: Bowel sounds are normal  There is no distension  Palpations: Abdomen is soft  Tenderness: There is no abdominal tenderness  Musculoskeletal:         General: No swelling  Comments: Right AKA    Neurological:      Mental Status: He is alert  Mental status is at baseline  Psychiatric:         Mood and Affect: Mood normal          Discussion with Family: d/w Paulino over the phone     Discharge instructions/Information to patient and family:   See after visit summary for information provided to patient and family  Provisions for Follow-Up Care:  See after visit summary for information related to follow-up care and any pertinent home health orders        Disposition:     Other Skilled [facility] [address] [facility]:   · Not Applicable to this Patient - Not Applicable to this Patient    Planned Readmission: no     Discharge Statement:  I spent 35 minutes discharging the patient  This time was spent on the day of discharge  I had direct contact with the patient on the day of discharge  Greater than 50% of the total time was spent examining patient, answering all patient questions, arranging and discussing plan of care with patient as well as directly providing post-discharge instructions  Additional time then spent on discharge activities  Discharge Medications:  See after visit summary for reconciled discharge medications provided to patient and family        ** Please Note: This note has been constructed using a voice recognition system **

## 2021-03-04 NOTE — ASSESSMENT & PLAN NOTE
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

## 2021-03-04 NOTE — ASSESSMENT & PLAN NOTE
· Left-sided candidal empyema  · Per ID:  · Continue micafungin through 03/12/2021  · Left PICC line in place until completion of IV micafungin   · May eventually need decortication  · Recheck CBC with diff and CMP at least weekly while on treatment

## 2021-03-04 NOTE — ASSESSMENT & PLAN NOTE
Lab Results   Component Value Date    HGBA1C 5 3 02/26/2021       Recent Labs     03/03/21  0651 03/03/21  1128 03/03/21  1618 03/03/21 2059   POCGLU 250* 195* 202* 305*       Blood Sugar Average: Last 72 hrs:  · (P) 906 [id number]       · Continue Lantus, and Humalog t i d   With meals  · Increased Lantus to 15 units qhs given persistent hyperglycemia yesterday- monitor response after discharge   · Titrate insulin dose based on Accu-Cheks  · Avoid hypoglycemia

## 2021-03-04 NOTE — PROGRESS NOTES
Progress Note - Pulmonary   Chastity Marie 56 y o  male MRN: [id number]  Unit/Bed#: -01 Encounter: [id number]      Acute hypoxic respiratory failure secondary to E  Coli PNA with superimposed worsening fibrosis   - on room air, saturating well   - c/w xopenex, atrovent  -  budesonide and perforomist started on 2/27 - can continue in rehab   - mucinex po bid   - encourage mobility   - sputum cx shows E  Coli   - s/p zosyn x 2 days, change to Augmentin today for 5 days total   -c/w flutter valve   - CTA showed worsening bronchiectasis and honeycombing, with trace left pleural effusion and residual small left hydropneumothorax   - PCT 0 21 on 3/2   - given evidence of fibrosis on CTA, discussed with case management regarding pirfenidone 801mg po tid -needs to be pre-authorized with insurance, will submit paperwork today  - he may still go to rehab while this is in process     Candida albicans empyema   - on micafungin through 3/12 as per ID     History of left hydropneumothorax ex vacuo  - s/p left 10F IR chest tube on 2/11 - removed by thoracics on 2/17   - thoracic surgery consulted last admission for trapped lung and decortication - no surgical intervention at present likely outpatient      Hx COVID-19 pneumonia  - initially diagnosed on 12/31/2020 and returned 1/11 with requiring intubation on 01/12/2021 - 01/25/2021     History of tobacco abuse     Hepatitis C  - doesn't appear to have been treated given new diagnosis in 2/2021      Right lower extremity emboli s/p AKA    - on Eliquis     Subjective:   Patient seen/examined this AM  He experiences some SOB at times when moving and eating  He still has a cough but not able to spit anything up  Review of Systems   Respiratory: Positive for cough  All other systems reviewed and are negative        Objective:     Vitals:    03/04/21 0105 03/04/21 0712 03/04/21 0742 03/04/21 0744   BP:   137/83 137/83   BP Location:       Pulse:    93   Resp:       Temp: 97 8 °F (36 6 °C) 97 8 °F (36 6 °C)   TempSrc:       SpO2: 97% 94%  95%   Weight:       Height:               Intake/Output Summary (Last 24 hours) at 3/4/2021 0749  Last data filed at 3/3/2021 2030  Gross per 24 hour   Intake 120 ml   Output 450 ml   Net -330 ml         Physical Exam  Constitutional:       Comments: thin   HENT:      Head: Normocephalic  Eyes:      Pupils: Pupils are equal, round, and reactive to light  Neck:      Musculoskeletal: Normal range of motion  Cardiovascular:      Rate and Rhythm: Normal rate and regular rhythm  Pulses: Normal pulses  Pulmonary:      Breath sounds: Rales present  Abdominal:      General: Abdomen is flat  Palpations: Abdomen is soft  Musculoskeletal:      Comments: R AKA    Skin:     General: Skin is warm and dry  Neurological:      General: No focal deficit present  Mental Status: He is alert and oriented to person, place, and time  Labs: I have personally reviewed pertinent lab results    Results from last 7 days   Lab Units 03/03/21  0518 03/02/21  0456 02/27/21  0612 02/26/21  1628   WBC Thousand/uL 9 82 9 84 10 46* 20 01*   HEMOGLOBIN g/dL 8 2* 8 2* 8 1* 9 2*   HEMATOCRIT % 26 9* 27 2* 26 9* 30 3*   PLATELETS Thousands/uL 245 253 272 393*   NEUTROS PCT % 81* 85*  --  95*   MONOS PCT % 6 5  --  1*      Results from last 7 days   Lab Units 03/03/21  0519 03/02/21  0456 02/27/21  0612 02/26/21  1628   POTASSIUM mmol/L 4 3 4 1 3 8 4 8   CHLORIDE mmol/L 101 102 108 102   CO2 mmol/L 29 30 26 28   BUN mg/dL 11 10 15 17   CREATININE mg/dL 0 50* 0 53* 0 44* 0 64   CALCIUM mg/dL 8 7 8 9 8 6 8 7   ALK PHOS U/L  --   --   --  114   ALT U/L  --   --   --  8*   AST U/L  --   --   --  11              Results from last 7 days   Lab Units 02/26/21  1628   INR  2 77*   PTT seconds 62*         0   Lab Value Date/Time    TROPONINI <0 02 02/13/2021 2036    TROPONINI <0 03 01/11/2021 1020    TROPONINI <0 03 01/06/2021 0557    TROPONINI <0 03 12/31/2020 [address] <0 02 05/21/2020 1517    TROPONINI <0 02 05/21/2020 1228         Imaging and other studies: I have personally reviewed pertinent reports     and I have personally reviewed pertinent films in PACS        Verna Escobar MD   Pulmonary & Critical Care Fellow  [facility]

## 2021-03-11 PROBLEM — Z89.611 S/P AKA (ABOVE KNEE AMPUTATION), RIGHT (HCC): Status: ACTIVE | Noted: 2021-03-11

## 2021-03-17 ENCOUNTER — TELEPHONE (OUTPATIENT)
Dept: CARDIAC SURGERY | Facility: CLINIC | Age: 61
End: 2021-03-17

## 2021-03-18 ENCOUNTER — DOCUMENTATION (OUTPATIENT)
Dept: PULMONOLOGY | Facility: CLINIC | Age: 61
End: 2021-03-18

## 2021-03-18 NOTE — PROGRESS NOTES
Pt's nephew stopped by the office today  to sign enrollment form which I faxed to [phone] along with a copy of insurance card

## 2021-03-23 NOTE — ASSESSMENT & PLAN NOTE
NEW PATIENT SPECIALTY PHARMACY CONSULTATION     Situation  Spoke to patient today regarding the new medication Otezla (apremilast) to set up delivery and provide consultation.     Start of Therapy Date: Patient is to start therapy on 3/25/2021    Background  Verify Allergies:   ALLERGIES:  Penicillins    Verify Medication list:   Current Outpatient Medications   Medication Sig   • ibuprofen (MOTRIN) 200 MG tablet Take 200 mg by mouth every 6 hours as needed for Pain.   • Apremilast 10 & 20 & 30 MG Tablet Therapy Pack Day 1: 10 mg (am); D2: 10 mg (am/pm); D3: 10 mg (am), 20 mg (pm); D4: 20 mg (am/pm); D5: 20 mg (am), 30 mg (pm); D6 onward: 30 mg 2xday   • nicotine (NICODERM) 14 MG/24HR patch Place 1 patch onto the skin every 24 hours.   • folic acid (FOLATE) 1 MG tablet Take 1 tablet by mouth daily.   • meloxicam (MOBIC) 7.5 MG tablet Take 1-2 tabs daily prn joint pain (Patient taking differently: Take 1-2 tabs daily prn joint pain    HAS NOT STARTED YET)   • traZODone (DESYREL) 50 MG tablet Take 1 tablet by mouth daily as needed for Sleep.   • MELATONIN PO Take 5 mg by mouth. As needed.   • nalTREXone (REVIA) 50 MG tablet Take 1 tablet by mouth daily.   • thiamine (VITAMIN B1) 100 MG tablet Take 100 mg by mouth daily.   • Multiple Vitamin (MULTI-VITAMINS) Tab Take 1 tablet by mouth.     No current facility-administered medications for this visit.         Drug Interactions Identified?    No    Drug-Food Interactions:   - No food interactions    Screen Medications for ISMP high alert drugs   Medication profile does NOT include ISMP high alert drugs    Assessment    Purpose of treatment (indication): Psoriatic Arthritis (PsA)    Dose appropriate for renal function: Yes   Dose appropriate for hepatic function: Yes     Warnings and Precautions  - Depression: Tell your doctor if you have any feelings of depression, suicidal thoughts, or suicidal behavior before starting and while you’re on this medication. Report to your  Patient presenting with malnutrition  S/p nutrition assessment doctor any of these symptoms or other mood changes that develop or worsen during treatment.  - Weight decrease: Monitor your weight regularly. If unexplained significant weight loss occurs, tell your doctor. In clinical trials, weight decrease between 5%-10% of body weight was reported in 10%-12% of patients.     Fertility, Pregnancy and Lactation  - Pregnancy Category C:   o Adverse events were not observed in some animal studies.  - Lactation:   o It is not known if apremilast is excreted into breast milk. The [place] labeling recommends that caution be used if administered to a nursing woman.     How to Administer (including dosing schedules if necessary)  How supplied: 28-day Starter Pack (4) 10 mg, (4) 20 mg, (47) 30 mg tablets    Dosing:   Initial dosing:   Follow dosing as outlined in the starter package.   • Day 1: Take 10 mg in the morning.   • Day 2: Take 10 mg twice daily.   • Day 3: Take 10 mg in the morning and 20 mg in the evening.  • Day 4: Take 20 mg twice daily.  • Day 5: Take 20 mg in the morning and 30 mg in the evening.   • Day 6: Take 30 mg twice daily.     Maintenance dosing:   Take one tablet (30 mg) by mouth twice daily.     Administration:   Take with or without food; do not crush, chew, or split tablets.     Missed Doses  Patient was informed to not miss doses, however if one is missed and it is close to the next dose, skip previous dose and move on to next scheduled dose.  Do not double up on doses.    Toxicities (Side Effects)  The most common side effects are diarrhea, nausea, headache, and upper respiratory tract infection.     Safe Handling and Disposal  - Store tablets at room temperature, not to exceed 86 degrees Fahrenheit.     Educational Materials Provided  ? [facility] Customer Information Handbook   ? [employer] Carenotes   ? Notice of Privacy Practices    Additional supplies provided      List relevant caregivers that can be contacted and/or medication information  be discussed    Name Relationship Phone Number   Stefani Blevins mother      Literacy and Adherence Assessment  Patient's potential for non-adherence was assessed using the four question Morisky tool. The patient's responses shown below indicate high probability of adherence. (One or more answers of \"Yes\" is a potential sign of low adherence to medication).    1. Do you ever forget to take your medication? No    2. Do you ever have problems remembering to take your medication? No    3. When you feel better, do you sometimes stop taking your medication? No    4. Sometimes, if you feel worse when you take your medication, do you stop taking it? No    Patient's health literacy was assessed with a brief three question literacy tool.     1. How often do you have someone (like a family member, friend, hospital/clinic worker, or caregiver) help you read healthcare materials? None of the time    2. How often do you have problems learning about your medical condition because of difficulty understanding written information? None of the time    3. How confident are you filling out forms by yourself? All of the time    Based on patient's responses, does patient have potential health literacy issues? No      Recommendations  Pharmacists Care plan including follow up (including clinic contact instructions)  1. Recommend to start medication as prescribed on 3/25/2021 or as directed by your physician.  2. Patient was counseled on Otezla (ie. warnings/precautions, side effects, dosing, frequency, route, additional instructions to take, etc.) and verbalized understanding.  3. Desires/motivation of the patient: to control symptoms of PsA  4. Problems and/or needs identified upon assessment: none  a.   Strategies to address problems and/or needs: n/a  b.   Measurable goals and timeframe for each: n/a  5. Goals of care:  a.   Ensure adherence  b.   Minimize side effects  c.   Maximize patient’s response to therapy  6. Patient was  counseled on proper use of additional supplies provided for the initial shipment of medications as detailed above to help with common side effects and promote adherence.  7. Patient’s medical records ([email or web address]) will be reviewed once every month or every cycle, whichever is less, to evaluate refill appropriateness and make interventions based on changes in their profile (ie. new medications, recent lab results etc).  8. An [facility] caregiver will follow up for refill delivery in 4 weeks, and determine treatment compliance and presence of adverse effects to treatment.  9. [facility] 30-day assessment due in 4/2021 to evaluate patient’s response to therapy, side effects, changes in their medications/allergies, drug interactions and adherence.  10. Medication will be shipped on 3/24/2021 via [employer] and estimated delivery on 3/25/2021 to store [facility] for patient pickup.  11. Patient has [facility] phone number, [phone], for questions and concerns.   12. Patient acknowledges and agrees with plan of care.       Jeet Daugherty, PharmD.  [facility] Coordinator  [id number] [place] [address]  T: [phone] F: [phone]  [email or web address]

## 2021-03-24 ENCOUNTER — TELEPHONE (OUTPATIENT)
Dept: VASCULAR SURGERY | Facility: CLINIC | Age: 61
End: 2021-03-24

## 2021-03-24 NOTE — TELEPHONE ENCOUNTER
Spoke w/ pt to confirm appt, pt stated he is currently still admitted at [facility]   Please call pt to r/s appt w/ Bernarda in [facility]

## 2021-03-25 ENCOUNTER — TELEPHONE (OUTPATIENT)
Dept: PULMONOLOGY | Facility: CLINIC | Age: 61
End: 2021-03-25

## 2021-03-25 NOTE — PROGRESS NOTES
Documentation was sent back from [facility] because ICD code was incomplete will ask the doctor to review doc and send it back asap

## 2021-03-25 NOTE — TELEPHONE ENCOUNTER
L/M for Pt's nephew asking  to call back regarding consent form, Brother said hw would let him know

## 2021-03-26 DIAGNOSIS — J86.9 EMPYEMA LUNG (HCC): ICD-10-CM

## 2021-03-26 DIAGNOSIS — J96.01 ACUTE RESPIRATORY FAILURE WITH HYPOXIA (HCC): Primary | ICD-10-CM

## 2021-03-26 DIAGNOSIS — I74.10 AORTIC THROMBUS (HCC): ICD-10-CM

## 2021-03-26 RX ORDER — IPRATROPIUM BROMIDE AND ALBUTEROL SULFATE 2.5; .5 MG/3ML; MG/3ML
3 SOLUTION RESPIRATORY (INHALATION) 4 TIMES DAILY
Qty: 120 VIAL | Refills: 3 | Status: SHIPPED | OUTPATIENT
Start: 2021-03-26 | End: 2021-06-01

## 2021-03-29 ENCOUNTER — OFFICE VISIT (OUTPATIENT)
Dept: FAMILY MEDICINE CLINIC | Facility: CLINIC | Age: 61
End: 2021-03-29

## 2021-03-29 VITALS
HEART RATE: 105 BPM | SYSTOLIC BLOOD PRESSURE: 138 MMHG | OXYGEN SATURATION: 95 % | HEIGHT: 66 IN | TEMPERATURE: 97.6 F | BODY MASS INDEX: 23.48 KG/M2 | DIASTOLIC BLOOD PRESSURE: 76 MMHG

## 2021-03-29 DIAGNOSIS — Z89.611 S/P AKA (ABOVE KNEE AMPUTATION), RIGHT (HCC): ICD-10-CM

## 2021-03-29 DIAGNOSIS — E11.42 TYPE 2 DIABETES MELLITUS WITH DIABETIC POLYNEUROPATHY, WITH LONG-TERM CURRENT USE OF INSULIN (HCC): ICD-10-CM

## 2021-03-29 DIAGNOSIS — J86.9 EMPYEMA LUNG (HCC): ICD-10-CM

## 2021-03-29 DIAGNOSIS — I82.411 ACUTE DEEP VEIN THROMBOSIS (DVT) OF FEMORAL VEIN OF RIGHT LOWER EXTREMITY (HCC): ICD-10-CM

## 2021-03-29 DIAGNOSIS — Z79.4 TYPE 2 DIABETES MELLITUS WITH OTHER CIRCULATORY COMPLICATION, WITH LONG-TERM CURRENT USE OF INSULIN (HCC): ICD-10-CM

## 2021-03-29 DIAGNOSIS — I74.10 AORTIC THROMBUS (HCC): ICD-10-CM

## 2021-03-29 DIAGNOSIS — Z79.4 TYPE 2 DIABETES MELLITUS WITHOUT COMPLICATION, WITH LONG-TERM CURRENT USE OF INSULIN (HCC): ICD-10-CM

## 2021-03-29 DIAGNOSIS — Z76.89 ENCOUNTER FOR SUPPORT AND COORDINATION OF TRANSITION OF CARE: Primary | ICD-10-CM

## 2021-03-29 DIAGNOSIS — E11.9 TYPE 2 DIABETES MELLITUS WITHOUT COMPLICATION, WITH LONG-TERM CURRENT USE OF INSULIN (HCC): ICD-10-CM

## 2021-03-29 DIAGNOSIS — Z79.4 TYPE 2 DIABETES MELLITUS WITH DIABETIC POLYNEUROPATHY, WITH LONG-TERM CURRENT USE OF INSULIN (HCC): ICD-10-CM

## 2021-03-29 DIAGNOSIS — E11.59 TYPE 2 DIABETES MELLITUS WITH OTHER CIRCULATORY COMPLICATION, WITH LONG-TERM CURRENT USE OF INSULIN (HCC): ICD-10-CM

## 2021-03-29 PROCEDURE — 99495 TRANSJ CARE MGMT MOD F2F 14D: CPT | Performed by: NURSE PRACTITIONER

## 2021-03-29 RX ORDER — GABAPENTIN 100 MG/1
200 CAPSULE ORAL EVERY 8 HOURS
COMMUNITY
Start: 2021-03-24 | End: 2021-04-26

## 2021-03-29 RX ORDER — LISINOPRIL AND HYDROCHLOROTHIAZIDE 25; 20 MG/1; MG/1
TABLET ORAL
COMMUNITY
Start: 2021-03-13 | End: 2021-06-01

## 2021-03-29 RX ORDER — OXYCODONE HYDROCHLORIDE 5 MG/1
5 TABLET ORAL EVERY 6 HOURS PRN
COMMUNITY
Start: 2021-03-24 | End: 2021-03-29 | Stop reason: SDUPTHER

## 2021-03-29 RX ORDER — CETIRIZINE HYDROCHLORIDE 10 MG/1
10 TABLET ORAL DAILY
COMMUNITY
Start: 2021-03-24 | End: 2021-09-20

## 2021-03-29 RX ORDER — IBUPROFEN 400 MG/1
400 TABLET ORAL EVERY 8 HOURS PRN
COMMUNITY
Start: 2021-03-24 | End: 2021-06-01

## 2021-03-29 RX ORDER — FLUTICASONE PROPIONATE 50 MCG
1 SPRAY, SUSPENSION (ML) NASAL DAILY
COMMUNITY
Start: 2021-03-25 | End: 2021-09-20

## 2021-03-29 RX ORDER — OXYCODONE HYDROCHLORIDE 5 MG/1
5 TABLET ORAL EVERY 6 HOURS PRN
Qty: 28 TABLET | Refills: 0 | Status: SHIPPED | OUTPATIENT
Start: 2021-03-29 | End: 2021-04-05

## 2021-03-29 RX ORDER — LANSOPRAZOLE 30 MG/1
30 CAPSULE, DELAYED RELEASE ORAL DAILY
COMMUNITY
Start: 2021-03-24 | End: 2021-04-26 | Stop reason: SDUPTHER

## 2021-03-29 RX ORDER — ISOSORBIDE MONONITRATE 120 MG/1
TABLET, EXTENDED RELEASE ORAL
Status: ON HOLD | COMMUNITY
Start: 2021-03-13 | End: 2021-06-25 | Stop reason: SDUPTHER

## 2021-03-29 RX ORDER — OMEPRAZOLE 20 MG/1
CAPSULE, DELAYED RELEASE ORAL
COMMUNITY
Start: 2021-03-13 | End: 2021-06-01

## 2021-03-29 RX ORDER — OXYCODONE HYDROCHLORIDE 5 MG/1
5 TABLET ORAL EVERY 6 HOURS PRN
Qty: 30 TABLET | Refills: 0 | Status: SHIPPED | OUTPATIENT
Start: 2021-03-29 | End: 2021-03-29

## 2021-03-29 RX ORDER — NALOXONE HYDROCHLORIDE 4 MG/.1ML
SPRAY NASAL
Qty: 1 EACH | Refills: 1 | Status: SHIPPED | OUTPATIENT
Start: 2021-03-29

## 2021-03-29 RX ORDER — ALBUTEROL SULFATE 90 UG/1
2 AEROSOL, METERED RESPIRATORY (INHALATION) EVERY 4 HOURS PRN
Refills: 0
Start: 2021-03-29

## 2021-03-29 NOTE — PROGRESS NOTES
Assessment/Plan:     Diagnoses and all orders for this visit:    Encounter for support and coordination of transition of care    Type 2 diabetes mellitus with diabetic polyneuropathy, with long-term current use of insulin ([place] [address] )  -     Ambulatory Referral to Ophthalmology; Future  -     Comprehensive metabolic panel; Future    Empyema lung ([place] [address] )  -     Ambulatory referral to Pulmonology; Future  -     CBC and differential; Future    Type 2 diabetes mellitus without complication, with long-term current use of insulin ([facility])    Type 2 diabetes mellitus with other circulatory complication, with long-term current use of insulin ([facility])  -     HEMOGLOBIN A1C W/ EAG ESTIMATION; Future    Aortic thrombus ([facility])  -     albuterol (PROVENTIL HFA,VENTOLIN HFA) 90 mcg/act inhaler; Inhale 2 puffs every 4 (four) hours as needed for wheezing    S/P AKA (above knee amputation), right ([facility])  -     oxyCODONE (ROXICODONE) 5 mg immediate release tablet; Take 1 tablet (5 mg total) by mouth every 6 (six) hours as needed for severe pain for up to 5 daysMax Daily Amount: 20 mg  -     naloxone (NARCAN) 4 mg/0 1 mL nasal spray; Administer 1 spray into a nostril  If no response after 2-3 minutes, give another dose in the other nostril using a new spray  Acute deep vein thrombosis (DVT) of femoral vein of right lower extremity ([facility])    Other orders  -     Discontinue: oxyCODONE (ROXICODONE) 5 mg immediate release tablet; Take 5 mg by mouth every 6 (six) hours as needed  -     omeprazole (PriLOSEC) 20 mg delayed release capsule  -     lisinopril-hydrochlorothiazide (PRINZIDE,ZESTORETIC) 20-25 MG per tablet  -     lansoprazole (PREVACID) 30 mg capsule; Take 30 mg by mouth daily  -     isosorbide mononitrate (IMDUR) 120 mg 24 hr tablet  -     ibuprofen (MOTRIN) 400 mg tablet; Take 400 mg by mouth every 8 (eight) hours as needed  -     gabapentin (NEURONTIN) 100 mg capsule;  Take 200 mg by mouth every 8 (eight) hours  -     fluticasone (FLONASE) 50 mcg/act nasal spray; 1 spray into each nostril daily  -     cetirizine (ZyrTEC) 10 mg tablet; Take 10 mg by mouth daily          Subjective:      Patient ID: Truong Baxter is a 59 y o  male  Patient presents for follow up from hospitalization at [facility]  Patient had a right AKA 1/18/2021 and then was in the hospital and intubated for COVID/ e  Coli  pneumonia and empyema ( was on micafungin)  Patient was d/c and returned home on 3/26/2021  He is living with his nephew who is caring for him  Patient states that he is feeling much better now that he has returned home  Patient will be having home health aides, PT/OT coming in to help him at home  Patient will be following up with surgeon on 4/6/ Patient states that he continues to have pain at the right stump and is taking gabapentin/oxycodone  for his pain  This does help control his symptoms but they worsen at night in bed  Discussed positioning with patient  Patient is currently taking Eliquis for DVT  However, patient lost his job during his hospitalization  Therefore, patient can not longer afford this medication  Offered patient to switch to coumadin but is concerned over having to have frequent lab draws  Will reach out to case management to see if they would be able to provider more information on finances for patient  Patient states that over all his breathing is improved  He does need to have a nebulizer at home- but will unable to afford  Is using his albuterol inhaler  Patient states his blood sugars are well controlled at home around 130  He is taking his medication as prescribed (janumet, jardiance and lantus 15 units at 2pm)  Reviewed need for high protein diet to prevent wasting of muscles as he is currently in a wheelchair  States that he is able to transfer from the bed to the chair with assistance of 1  He does have a cushion on the chair and shifts weight side to side to prevent wounds/ulcers         The following portions of the patient's history were reviewed and updated as appropriate: allergies, current medications, past family history, past medical history, past social history, past surgical history and problem list     Review of Systems   Constitutional: Negative for activity change, chills, fatigue, fever and unexpected weight change  HENT: Negative for congestion, ear pain, sinus pressure, sinus pain, sneezing and sore throat  Eyes: Negative for pain, discharge and visual disturbance  Respiratory: Positive for wheezing  Negative for apnea, cough, chest tightness and shortness of breath  Cardiovascular: Negative for chest pain and palpitations  Gastrointestinal: Negative for abdominal pain, constipation (taking stool softeners- reviewed opiod induce constiipation with patient  Has 1 BM a day ), diarrhea, nausea and vomiting  Endocrine: Negative for cold intolerance, heat intolerance, polydipsia, polyphagia and polyuria  Genitourinary: Negative for decreased urine volume, dysuria, hematuria and urgency  Musculoskeletal: Positive for myalgias  Negative for arthralgias, back pain, neck pain and neck stiffness  Skin: Negative for color change and rash  Neurological: Negative for dizziness, seizures, syncope and numbness  Psychiatric/Behavioral: Negative for agitation, hallucinations and self-injury  The patient is not nervous/anxious and is not hyperactive  All other systems reviewed and are negative  Objective:      /76   Pulse 105   Temp 97 6 °F (36 4 °C) (Tympanic)   Ht 5' 6" (1 676 m)   SpO2 95%   BMI 23 48 kg/m²          Physical Exam  Vitals signs and nursing note reviewed  Constitutional:       General: He is not in acute distress  Appearance: Normal appearance  He is not ill-appearing or toxic-appearing  HENT:      Head: Normocephalic and atraumatic        Right Ear: Tympanic membrane, ear canal and external ear normal       Left Ear: Tympanic membrane, ear canal and external ear normal    Eyes: Extraocular Movements: Extraocular movements intact  Conjunctiva/sclera: Conjunctivae normal       Pupils: Pupils are equal, round, and reactive to light  Neck:      Musculoskeletal: Normal range of motion and neck supple  No neck rigidity or muscular tenderness  Cardiovascular:      Rate and Rhythm: Normal rate and regular rhythm  Pulses: Normal pulses  Heart sounds: Normal heart sounds  No murmur  No friction rub  No gallop  Pulmonary:      Effort: Pulmonary effort is normal  No respiratory distress  Breath sounds: No stridor  Wheezing present  Abdominal:      General: Abdomen is flat  Bowel sounds are normal  There is no distension  Palpations: Abdomen is soft  Tenderness: There is no abdominal tenderness  There is no guarding  Musculoskeletal:        Legs:    Skin:     General: Skin is warm and dry  Capillary Refill: Capillary refill takes less than 2 seconds  Coloration: Skin is not jaundiced or pale  Findings: No bruising  Neurological:      General: No focal deficit present  Mental Status: He is alert and oriented to person, place, and time  Mental status is at baseline  Psychiatric:         Mood and Affect: Mood normal          Behavior: Behavior normal          Thought Content:  Thought content normal          Judgment: Judgment normal

## 2021-03-29 NOTE — TELEPHONE ENCOUNTER
Dani Centeno calling saying they keep getting the old request form sent back to them  The patient needs to sign a "Respiratory Patient Request Form"  She is faxing a blank one to our [facility] office so we have a copy  Please reach out to patient again and let him know he will need to re-sign this one

## 2021-03-30 ENCOUNTER — TELEPHONE (OUTPATIENT)
Dept: FAMILY MEDICINE CLINIC | Facility: CLINIC | Age: 61
End: 2021-03-30

## 2021-03-31 NOTE — TELEPHONE ENCOUNTER
Jinni calling saying they received fax but the top was cut off  Can you pelase re-fax form to [phone]

## 2021-04-06 ENCOUNTER — OFFICE VISIT (OUTPATIENT)
Dept: VASCULAR SURGERY | Facility: CLINIC | Age: 61
End: 2021-04-06

## 2021-04-06 ENCOUNTER — TELEPHONE (OUTPATIENT)
Dept: VASCULAR SURGERY | Facility: CLINIC | Age: 61
End: 2021-04-06

## 2021-04-06 ENCOUNTER — TELEPHONE (OUTPATIENT)
Dept: FAMILY MEDICINE CLINIC | Facility: CLINIC | Age: 61
End: 2021-04-06

## 2021-04-06 VITALS
SYSTOLIC BLOOD PRESSURE: 102 MMHG | HEART RATE: 106 BPM | TEMPERATURE: 99.6 F | BODY MASS INDEX: 22.12 KG/M2 | HEIGHT: 68 IN | DIASTOLIC BLOOD PRESSURE: 60 MMHG

## 2021-04-06 DIAGNOSIS — Z89.611 S/P AKA (ABOVE KNEE AMPUTATION), RIGHT (HCC): Primary | ICD-10-CM

## 2021-04-06 DIAGNOSIS — Z98.890 POSTOPERATIVE STATE: ICD-10-CM

## 2021-04-06 DIAGNOSIS — I82.411 ACUTE DEEP VEIN THROMBOSIS (DVT) OF FEMORAL VEIN OF RIGHT LOWER EXTREMITY (HCC): ICD-10-CM

## 2021-04-06 PROCEDURE — 99024 POSTOP FOLLOW-UP VISIT: CPT | Performed by: PHYSICIAN ASSISTANT

## 2021-04-06 NOTE — ASSESSMENT & PLAN NOTE
Acute nonocclusive right femoral vein DVT with PE 1/12/21  -continue Eliquis  May be transitioning to coumadin due to recent change in financial/insurance status    -f/u and management per PCP

## 2021-04-06 NOTE — ASSESSMENT & PLAN NOTE
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 and formalization of R AKA by Dr Kennedi Thomas 1/18/21  Patient presents for 1st postoperative follow-up  -sutures/staples removed previously on 3/3/2021 during recent hospitalization related to E coli pneumonia with associated acute hypoxic respiratory failure  -doing well  R AKA stump healed well without stump necrosis or edema   -continue incisional care as directed  -initiate stump  and prosthesis fitting/training  Nursing staff to contact Jerome Kimball communicated with patient during hospitalization  -continue Eliquis 2/2 R femoral DVT and PE  -return to office with surgeon in 1 month for postop f/u and reevaluation of stump pain at distal femur  -continue anticoagulation related to DVT/PE  May be transitioning to coumadin due to change in financial status  Management per PCP    -instructed to contact the office with new symptoms, concerns or changes in stump

## 2021-04-06 NOTE — PATIENT INSTRUCTIONS
DISCHARGE INSTRUCTIONS  LOWER EXTREMITY AMPUTATION    Following discharge from the hospital, you may have some questions about your operation, your activities or your general condition  These instructions may answer some of your questions and help you adjust during the first few weeks following your operation  REHABILITATION:   All patients require some form of rehabilitation after this procedure  This will assist with your return to daily activities by incorporating exercise and balance training  This may be in the form of visiting nurses and physical therapy in your home  However, admission to a rehabilitation facility is also common for a period of time before you return home  ACTIVITY:  Your limitations for activity will be discussed prior to discharge  Physical therapy and rehab staff will direct progression of your activity based on your progression in the physical therapy  Please follow their recommendations closely  DIET:  Resume your normal diet  Try to eat low fat and low cholesterol foods  INCISION:  Wash incision daily with soap and water  Pat dry thoroughly  Clean, dry gauze and ACE wrap to assist with edema control/stump shrinkage  It is normal to have swelling or discoloration around the incision  If increasing redness or pain develops, call our office immediately  You will have stitches or staples present after your surgery and these will be evaluated at your first post-operative visit  If any of your incisions are open and require dressing changes, you will be given instructions for your daily incision care  If you are not able to change the dressings, a visiting nurse will be arranged  PLEASE CALL THE OFFICE IF YOU HAVE ANY QUESTIONS  [phone] [facility] [phone] [facility] FREE [phone]  [address] [place] [address] , [address] [place] [address] [facility] [place], [address] [place] [address] Rd  [address] [facility] [address], [address], 210 [facility]  3769 [facility]7 [facility], [place], [address]  [address] [place], [place] 1215 [facility], 1st Floor, Keshia [address]  [address], 2nd Floor, [address] [facility] 97   1201 [facility], 8614 [facility], 960 [facility]  One [facility], [address] [place] [address], [facility], [address], [facility] 6    -continue to clean incision daily with soap and water  No lotions, ointments or creams to incision  No soaking or submerging incision until completely healed  -we will initiate use of stump  to help control edema and as a next step to prosthesis  Prosthesis order also provided to start fitting and training  -continue Eliquis related to hx right femoral vein DVT and PE  Recommend at least 6 months of anticoagulation or longer if indicated  Management per PCP  -return to office in 1 month with surgeon for final postoperative follow-up and re-evaluation of stump pain  -please contact the office in the interim with any questions, concerns or changes in stump, including separation of incision

## 2021-04-06 NOTE — TELEPHONE ENCOUNTER
This pt was seen by Rosa M Jimenez today at the [facility] office  Bernarda put in an order for a limb  and a prosthetic  Cathlean Cornell vas

## 2021-04-06 NOTE — TELEPHONE ENCOUNTER
Called Mango from [address] [place] [address] per GATITO Ye's request   Pt was seen in office today s/p R AKA  [facility] saw him in hospital   Pt now home and has [facility] home care providing therapy  She has placed orders for stump  and prosthetic  [employer], orders and today's ov note faxed to [facility] [place]  He will contact pt to f/u

## 2021-04-06 NOTE — PROGRESS NOTES
Assessment/Plan:    S/P AKA (above knee amputation), right ([facility] [address] )  58 y/o male former smoker HTN, HLD, DM II, GERD, chronic hepatitis-C, methadone therapy and COVID-19 pneumonia Jan '21 c/b acute respiratory failure requiring intubation, fungal L empyema, acute nonocclusive R FV DVT and PE 1/12/21 and acute critical RLE limb ischemia 2/2 aortic thrombus, s/p R SFA/PFA/pop/tibial embolectomy by Dr Lashay Sutton 1/13/21 followed by R guillotine amputation 1/14/21 and formalization of R AKA by Dr Юлия Faustin 1/18/21  Patient presents for 1st postoperative follow-up  -sutures/staples removed previously on 3/3/2021 during recent hospitalization related to E coli pneumonia with associated acute hypoxic respiratory failure  -doing well  R AKA stump healed well without stump necrosis or edema   -continue incisional care as directed  -initiate stump  and prosthesis fitting/training  Nursing staff to contact Madai Juancarloszeina communicated with patient during hospitalization  -continue Eliquis 2/2 R femoral DVT and PE  -return to office with surgeon in 1 month for postop f/u and reevaluation of stump pain at distal femur  -continue anticoagulation related to DVT/PE  May be transitioning to coumadin due to change in financial status  Management per PCP  -instructed to contact the office with new symptoms, concerns or changes in stump    Right femoral vein DVT (HCC)  Acute nonocclusive right femoral vein DVT with PE 1/12/21  -continue Eliquis  May be transitioning to coumadin due to recent change in financial/insurance status  -f/u and management per PCP       Diagnoses and all orders for this visit:    S/P AKA (above knee amputation), right (HCC)  -     Limb   -     Prosthetic    Acute deep vein thrombosis (DVT) of femoral vein of right lower extremity (HCC)    Postoperative state  -     Limb   -     Prosthetic          Subjective:      Patient ID: Inocencia Vasquez is a 59 y o  male      Pt is here today s/p right AKA done 1/14/2021 by Dr Ganga Gonzalez and he is s/p formalization of right AKA with wound washout and closure  Done 1/18/2021  Pt c/o pain in his right thigh, he says in the bone  The wound is clean, dry and healing  Pt is taking Eliquis  He is a former smoker  58 y/o male former smoker HTN, HLD, DM II, GERD, chronic hepatitis-C, methadone therapy and COVID-19 pneumonia Jan '21 c/b acute respiratory failure requiring intubation, fungal L empyema, acute nonocclusive R FV DVT and PE 1/12/21 and acute critical RLE limb ischemia 2/2 aortic thrombus, s/p R SFA/PFA/pop/tibial embolectomy by Dr Chaka Gunderson 1/13/21 followed by R guillotine amputation 1/14/21 and formalization of R AKA by Dr Ganga Gonzalez 1/18/21  Patient presents for 1st postoperative follow-up  Sutures/staples removed previously on 3/3/2021 during recent hospitalization related to E coli pneumonia with associated acute hypoxic respiratory failure  Patient doing well and now home, continuing to undergo home physical therapy  R AKA stump healed well without stump necrosis or edema  Patient reporting intermittent discomfort from right lateral edge of femur stump without tissue loss, dehiscence or skin necrosis  No signs of SSI  Patient anxious to proceed with prosthetic fitting  The following portions of the patient's history were reviewed and updated as appropriate: allergies, current medications, past family history, past medical history, past social history, past surgical history and problem list     Review of Systems      Objective:    R AKA    R AKA      /60 (BP Location: Left arm, Patient Position: Sitting, Cuff Size: Standard)   Pulse (!) 106   Temp 99 6 °F (37 6 °C) (Tympanic)   Ht 5' 8" (1 727 m)   BMI 22 12 kg/m²          Physical Exam  Vitals signs and nursing note reviewed  Exam conducted with a chaperone present  Constitutional:       General: He is not in acute distress  Appearance: Normal appearance   He is not ill-appearing, toxic-appearing or diaphoretic  Cardiovascular:      Rate and Rhythm: Regular rhythm  Tachycardia present  Heart sounds: No murmur  No friction rub  No gallop  Pulmonary:      Effort: Pulmonary effort is normal  No respiratory distress  Breath sounds: No stridor  No wheezing or rales  Comments: Few scattered coarse breath sounds right lung fields  Decreased breath sounds left base  Abdominal:      General: Bowel sounds are normal  There is no distension  Palpations: Abdomen is soft  Tenderness: There is no abdominal tenderness  Musculoskeletal:      Comments: R AKA stump warm, pink and healed well without erythema, induration, drainage, hematoma, ecchymosis, dehiscence or stump necrosis  No edema  Minimal tenderness with palpation right distal lateral edge of femur  No bogginess or crepitus  Skin:     General: Skin is warm and dry  Coloration: Skin is not pale  Findings: No bruising, erythema, lesion or rash  Neurological:      Mental Status: He is alert and oriented to person, place, and time  Psychiatric:         Mood and Affect: Mood normal          Thought Content:  Thought content normal

## 2021-04-08 ENCOUNTER — PATIENT OUTREACH (OUTPATIENT)
Dept: FAMILY MEDICINE CLINIC | Facility: CLINIC | Age: 61
End: 2021-04-08

## 2021-04-08 DIAGNOSIS — Z71.89 ENCOUNTER FOR COUNSELING FOR CARE MANAGEMENT OF PATIENT WITH CHRONIC CONDITIONS AND COMPLEX HEALTH NEEDS USING NURSE-BASED MODEL: Primary | ICD-10-CM

## 2021-04-08 DIAGNOSIS — Z89.611 S/P AKA (ABOVE KNEE AMPUTATION), RIGHT (HCC): Primary | ICD-10-CM

## 2021-04-08 RX ORDER — OXYCODONE HYDROCHLORIDE 5 MG/1
5 TABLET ORAL EVERY 6 HOURS PRN
Qty: 120 TABLET | Refills: 0 | Status: SHIPPED | OUTPATIENT
Start: 2021-04-08 | End: 2021-05-08

## 2021-04-08 NOTE — PROGRESS NOTES
Outpatient Care Management Note:  Call placed to Jalen's nephew, Codie Roberts  He is not available at this time  Provided my contact information and asked that he reach out to me  While reviewing chart, it was noted that LCSW from [facility] completed an MA application with the patient  Call placed to [facility], [facility] to confirm  Per David Martinez, the application was completed and left with the patient to be mailed

## 2021-04-08 NOTE — PROGRESS NOTES
Outpatient Care Management Note:  In basket referral received from PCP for assistance in obtaining Eliquis  Patient assistance application started and e-mailed to provider for signatures

## 2021-04-08 NOTE — TELEPHONE ENCOUNTER
I notified Elliott Jonse that Pat Pereira will fill the oxycodone for one month but patient needs to follow up with pain management  Referral is in the computer

## 2021-04-08 NOTE — TELEPHONE ENCOUNTER
Spoke with Danni Lay and they did not get to speak with Michael Zacarias yet  Miri Turcios can you reach out to them? Thank you!

## 2021-04-08 NOTE — PROGRESS NOTES
Outpatient Care Management Note:  Outreach call placed to [facility] regarding patient assistance forms  [facility] would like me to call back to speak with his nephew who handles his medications  Agreeable to that plan  [facility] does claim his brother and his nephew as dependents for tax purposes

## 2021-04-12 ENCOUNTER — PATIENT OUTREACH (OUTPATIENT)
Dept: FAMILY MEDICINE CLINIC | Facility: CLINIC | Age: 61
End: 2021-04-12

## 2021-04-12 NOTE — PROGRESS NOTES
Outpatient Care Management Note:  Call placed to Jalen's nephew, Kaity Montalvo, as requested  Per Kaity Montalvo, an MA application was filled out with the assistance of [address] [place], [place] [address]  The application was mailed and is being processed  They are also taking legal action against his employer for wrongful termination and employer paid COBRA benefits will be starting  Jalen's short term disability payments are also resuming  Alaina Beauchamp that as insurance coverage will be starting, unless the medications are not covered by the insurance, patient assistance forms are not appropriate  Advised him that if there continue to be issues after the resumption of insurance, he may reach out to me and forms can be completed at that time  Verbalized understanding  Sohail Orta made aware of same

## 2021-04-12 NOTE — PROGRESS NOTES
Please call patient and find out when his insurance will be starting and if he needs me to send a refill of his Eliquis to the pharmacy now that he will have his insurance back   Thanks   VERONICA Morales

## 2021-04-13 DIAGNOSIS — I74.10 AORTIC THROMBUS (HCC): ICD-10-CM

## 2021-04-13 NOTE — PROGRESS NOTES
Bang Byrnes and he stated he does not know at this time when insurance will start but he stated you can send over a short RX to local pharmacy

## 2021-04-26 ENCOUNTER — CONSULT (OUTPATIENT)
Dept: PAIN MEDICINE | Facility: CLINIC | Age: 61
End: 2021-04-26
Payer: COMMERCIAL

## 2021-04-26 VITALS
DIASTOLIC BLOOD PRESSURE: 60 MMHG | HEIGHT: 68 IN | BODY MASS INDEX: 22.12 KG/M2 | SYSTOLIC BLOOD PRESSURE: 120 MMHG | TEMPERATURE: 97.9 F

## 2021-04-26 DIAGNOSIS — K21.9 GASTROESOPHAGEAL REFLUX DISEASE, UNSPECIFIED WHETHER ESOPHAGITIS PRESENT: Primary | ICD-10-CM

## 2021-04-26 DIAGNOSIS — I74.10 AORTIC THROMBUS (HCC): ICD-10-CM

## 2021-04-26 DIAGNOSIS — G89.29 CHRONIC BILATERAL LOW BACK PAIN, UNSPECIFIED WHETHER SCIATICA PRESENT: ICD-10-CM

## 2021-04-26 DIAGNOSIS — M54.12 CERVICAL RADICULOPATHY: ICD-10-CM

## 2021-04-26 DIAGNOSIS — M54.2 NECK PAIN: ICD-10-CM

## 2021-04-26 DIAGNOSIS — M54.50 CHRONIC BILATERAL LOW BACK PAIN, UNSPECIFIED WHETHER SCIATICA PRESENT: ICD-10-CM

## 2021-04-26 DIAGNOSIS — E11.42 TYPE 2 DIABETES MELLITUS WITH DIABETIC POLYNEUROPATHY, UNSPECIFIED WHETHER LONG TERM INSULIN USE (HCC): Primary | ICD-10-CM

## 2021-04-26 PROCEDURE — 99244 OFF/OP CNSLTJ NEW/EST MOD 40: CPT | Performed by: ANESTHESIOLOGY

## 2021-04-26 RX ORDER — PREGABALIN 50 MG/1
50 CAPSULE ORAL 3 TIMES DAILY
Qty: 90 CAPSULE | Refills: 1 | Status: SHIPPED | OUTPATIENT
Start: 2021-04-26 | End: 2021-05-19 | Stop reason: SDUPTHER

## 2021-04-26 NOTE — PROGRESS NOTES
Assessment:  1  Type 2 diabetes mellitus with diabetic polyneuropathy, unspecified whether long term insulin use ([facility] [address] )    2  Neck pain    3  Cervical radiculopathy    4  Chronic bilateral low back pain, unspecified whether sciatica present        Plan:  Patient is a 27-year-old male with complaints of right arm pain and right leg pain with chronic pain syndrome secondary to phantom limb syndrome, diabetic neuropathy, cervical radiculopathy presents to office for initial consultation  Patient reported lam COVID after which he suffered blood clots result in the loss of his right lower extremity  Patient does report the pain in his stump is more of a throbbing pain  1  We will order an x-ray of the cervical spine and MRI of the cervical spine assess the discogenic pathology behind patient's right upper extremity pain weakness  2  We will order an x-ray of the lumbar spine assess for degenerative changes correlate patient's low back pain  3  We will provide patient with Lyrica 50 mg p o  t i d  for diabetic neuropathy   4  We will consider ultrasound-guided injection of neuroma however patient reports pain is more of a throbbing sensation ; in the near future  5  We will not provide opioid for pain control secondary to fact that pain is more neuropathic in nature  6  Follow-up in 1 month      There are risks associated with opioid medications, including dependence, addiction and tolerance  The patient understands and agrees to use these medications only as prescribed  Potential side effects of the medications include, but are not limited to, constipation, drowsiness, addiction, impaired judgment and risk of fatal overdose if not taken as prescribed  The patient was warned against driving while taking sedation medications  Sharing medications is a felony  At this point in time, the patient is showing no signs of addiction, abuse, diversion or suicidal ideation      [place] Prescription Drug Monitoring Program report was reviewed and was appropriate       History of Present Illness: The patient is a 59 y o  male who presents for consultation in regards to Arm Pain, Hand Pain, and Leg Pain  Symptoms have been present for  3 months  Symptoms began following a non work related injury  Pain is reported to be 8 on the numeric rating scale  Symptoms are felt constantly and worst in the no typical pattern  Symptoms are characterized as throbbing  Symptoms are associated with right arm weakness and right leg weakness  Aggravating factors include nothing  Relieving factors include exercise  No change in symptoms with kneeling, lying down, standing, bending, leaning forward, leaning bckward, sitting, walking, turning the head, relaxation, coughing/sneezing and bowel movements  Treatments that have been helpful include nothing  surgery , physical therapy and home exercise have provided no relief  Medications to relieve symptoms include diclofenac, methadone  Review of Systems:    Review of Systems   Constitutional: Negative for fever and unexpected weight change  HENT: Negative for trouble swallowing  Eyes: Negative for visual disturbance  Respiratory: Negative for shortness of breath and wheezing  Cardiovascular: Negative for chest pain and palpitations  Gastrointestinal: Negative for constipation, diarrhea, nausea and vomiting  Endocrine: Negative for cold intolerance, heat intolerance and polydipsia  Genitourinary: Negative for difficulty urinating and frequency  Musculoskeletal: Positive for arthralgias and myalgias  Negative for gait problem and joint swelling  Skin: Negative for rash  Neurological: Negative for dizziness, seizures, syncope, weakness and headaches  Hematological: Does not bruise/bleed easily  Psychiatric/Behavioral: Negative for dysphoric mood  All other systems reviewed and are negative          Past Medical History:   Diagnosis Date    Diabetes mellitus ([facility] [address] )  GERD (gastroesophageal reflux disease)     Hypercholesteremia     Hypertension     Methadone use ([facility] [address] )        Past Surgical History:   Procedure Laterality Date    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    Procedure: AMPUTATION ABOVE KNEE (AKA);   Surgeon: Hector Ragsdale MD;  [facility];  Service: Vascular    AMPUTATION ABOVE KNEE (AKA) Right 1/18/2021    Procedure: AMPUTATION ABOVE KNEE (AKA) FORMALIZATION,  R AKA wound washout, wound closure;  Surgeon: Hector Ragsdale MD;  [facility];  Service: Vascular    ARTERIOGRAM Right 1/13/2021    Procedure: ARTERIOGRAM;  Surgeon: Dea Salamanca DO;  [facility];  Service: Vascular    IR CHEST TUBE PLACEMENT  2/10/2021    IR LOWER EXTREMITY ANGIOGRAM  1/14/2021    THROMBECTOMY W/ EMBOLECTOMY Right 1/13/2021    Procedure: EMBOLECTOMY/THROMBECTOMY LOWER EXTREMITY;  Surgeon: Dea Salamanca DO;  [facility];  Service: Vascular       Family History   Problem Relation Age of Onset    Diabetes Mother     Hypertension Father     Leukemia Father     Acute lymphoblastic leukemia Father     Cancer Sister        Social History     Occupational History    Not on file   Tobacco Use    Smoking status: Former Smoker    Smokeless tobacco: Never Used   Substance and Sexual Activity    Alcohol use: Not Currently    Drug use: No     Comment: methadone    Sexual activity: Not on file         Current Outpatient Medications:     acetaminophen (TYLENOL) 325 mg tablet, Take 3 tablets (975 mg total) by mouth every 8 (eight) hours, Disp: , Rfl: 0    albuterol (PROVENTIL HFA,VENTOLIN HFA) 90 mcg/act inhaler, Inhale 2 puffs every 4 (four) hours as needed for wheezing, Disp: , Rfl: 0    apixaban (ELIQUIS) 5 mg, Take 1 tablet (5 mg total) by mouth 2 (two) times a day (Patient not taking: Reported on 3/29/2021), Disp: 60 tablet, Rfl: 3    apixaban (ELIQUIS) 5 mg, Take 1 tablet (5 mg total) by mouth 2 (two) times a day, Disp: 30 tablet, Rfl: 0    bisacodyl (DULCOLAX) 10 mg suppository, Insert 1 suppository (10 mg total) into the rectum daily as needed for constipation (Patient not taking: Reported on 3/29/2021), Disp: 12 suppository, Rfl: 0    Blood Glucose Monitoring Suppl (ONETOUCH VERIO) w/Device KIT, Use to test blood sugars 3 times daily, Disp: 1 kit, Rfl: 0    budesonide (PULMICORT) 0 5 mg/2 mL nebulizer solution, Take 1 vial (0 5 mg total) by nebulization every 12 (twelve) hours Rinse mouth after use   (Patient not taking: Reported on 3/29/2021), Disp: , Rfl: 0    cetirizine (ZyrTEC) 10 mg tablet, Take 10 mg by mouth daily, Disp: , Rfl:     cholecalciferol (VITAMIN D3) 1,000 units tablet, Take 2 tablets (2,000 Units total) by mouth daily, Disp: , Rfl: 0    Diclofenac Sodium (VOLTAREN) 1 %, Apply 4 g topically 4 (four) times a day, Disp: , Rfl: 0    Empagliflozin (Jardiance) 25 MG TABS, Take 1 tablet (25 mg total) by mouth every morning, Disp: 90 tablet, Rfl: 1    fluticasone (FLONASE) 50 mcg/act nasal spray, 1 spray into each nostril daily, Disp: , Rfl:     formoterol (PERFOROMIST) 20 MCG/2ML nebulizer solution, Take 1 vial (20 mcg total) by nebulization every 12 (twelve) hours (Patient not taking: Reported on 3/29/2021), Disp: , Rfl: 0    gabapentin (NEURONTIN) 100 mg capsule, Take 200 mg by mouth every 8 (eight) hours, Disp: , Rfl:     glycopyrrolate (ROBINUL) 1 mg tablet, Take 1 tablet (1 mg total) by mouth 3 (three) times a day (Patient not taking: Reported on 3/29/2021), Disp: , Rfl: 0    guaiFENesin (MUCINEX) 600 mg 12 hr tablet, Take 1 tablet (600 mg total) by mouth every 12 (twelve) hours (Patient not taking: Reported on 3/29/2021), Disp: , Rfl: 0    guaiFENesin 1200 MG TB12, Take 1 tablet (1,200 mg total) by mouth every 12 (twelve) hours (Patient not taking: Reported on 3/29/2021), Disp:  , Rfl: 0    ibuprofen (MOTRIN) 400 mg tablet, Take 400 mg by mouth every 8 (eight) hours as needed, Disp: , Rfl:     insulin glargine (LANTUS) 100 units/mL subcutaneous injection, Inject 4 Units under the skin daily at bedtime, Disp: , Rfl: 0    Insulin Pen Needle 31G X 5 MM MISC, by Does not apply route daily Pt to inject 4 X daily, Disp: 100 each, Rfl: 5    Insulin Pen Needle 31G X 5 MM MISC, 30 units sq 2X daily, Disp: 100 each, Rfl: 3    ipratropium (ATROVENT) 0 02 % nebulizer solution, Take 1 vial (0 5 mg total) by nebulization every 6 (six) hours (Patient not taking: Reported on 3/29/2021), Disp: , Rfl: 0    ipratropium-albuterol (DUO-NEB) 0 5-2 5 mg/3 mL nebulizer solution, Take 1 vial (3 mL total) by nebulization 4 (four) times a day (Patient not taking: Reported on 3/29/2021), Disp: 120 vial, Rfl: 3    isosorbide mononitrate (IMDUR) 120 mg 24 hr tablet, , Disp: , Rfl:     lansoprazole (PREVACID) 30 mg capsule, Take 30 mg by mouth daily, Disp: , Rfl:     levalbuterol (XOPENEX) 1 25 mg/0 5 mL nebulizer solution, Take 0 5 mL (1 25 mg total) by nebulization every 6 (six) hours (Patient not taking: Reported on 4/6/2021), Disp: , Rfl: 0    Lidocaine Viscous HCl (XYLOCAINE) 2 % mucosal solution, Swish and spit 15 mL 4 (four) times a day as needed for mouth pain or discomfort (Patient not taking: Reported on 3/29/2021), Disp: , Rfl: 0    lisinopril (ZESTRIL) 10 mg tablet, Take 1 tablet (10 mg total) by mouth daily, Disp: , Rfl: 0    lisinopril-hydrochlorothiazide (PRINZIDE,ZESTORETIC) 20-25 MG per tablet, , Disp: , Rfl:     menthol-methyl salicylate (BENGAY) 92-09 % cream, Apply topically 4 (four) times a day as needed (torticollis), Disp: , Rfl: 0    methadone (DOLOPHINE) 10 mg tablet, Take 7 tablets by mouth daily Continuation of care,, Disp: 21 tablet, Rfl: 0    multivitamin-minerals (CENTRUM ADULTS) tablet, Take 1 tablet by mouth daily, Disp: , Rfl: 0    naloxone (NARCAN) 4 mg/0 1 mL nasal spray, Administer 1 spray into a nostril   If no response after 2-3 minutes, give another dose in the other nostril using a new spray , Disp: 1 each, Rfl: 1    omeprazole (PriLOSEC) 20 mg delayed release capsule, , Disp: , Rfl:     oxyCODONE (ROXICODONE) 5 mg immediate release tablet, Take 1 tablet (5 mg total) by mouth every 6 (six) hours as needed for severe painMax Daily Amount: 20 mg, Disp: 120 tablet, Rfl: 0    Pirfenidone 267 MG CAPS, Take 3 capsules (801 mg total) by mouth 3 (three) times a day (Patient not taking: Reported on 3/29/2021), Disp: 30 capsule, Rfl: 0    polyethylene glycol (MIRALAX) 17 g packet, Take 17 g by mouth daily, Disp: , Rfl: 0    sitaGLIPtin-metFORMIN (JANUMET)  MG per tablet, Take 1 tablet by mouth 2 (two) times a day with meals, Disp: 180 tablet, Rfl: 2    Allergies   Allergen Reactions    Varenicline        Physical Exam:    /60 (BP Location: Left arm, Patient Position: Sitting, Cuff Size: Standard)   Temp 97 9 °F (36 6 °C)   Ht 5' 8" (1 727 m)   BMI 22 12 kg/m²     Constitutional: normal, well developed, well nourished, alert, in no distress and non-toxic and no overt pain behavior  Eyes: anicteric  HEENT: grossly intact  Neck: supple, symmetric, trachea midline and no masses   Pulmonary:even and unlabored  Cardiovascular:No edema or pitting edema present  Skin:Normal without rashes or lesions and well hydrated  Psychiatric:Mood and affect appropriate  Neurologic:Cranial Nerves II-XII grossly intact  Musculoskeletal:normal,  Right above-the-knee amputation     Cervical Spine examination demonstrates  Decreased ROM secondary to pain with lateral rotation to the left/right and bending to the left/right, in addition to neck flexion  5/5 upper extremity strength in all muscle groups bilaterally  Negative Spurling's maneuver to the b/l Ue, sensitivity to light touch intact b/l Ue  Lumbar/Sacral Spine examination demonstrates  Full range of motion lumbar spine with pain upon: flexion, lateral rotation to the left/right, and bending to the left/right    Bilateral lumbar paraspinals tender to palpation  Muscle spasms noted in the lumbar area bilaterally  4/5 lower extremity strength in all muscle groups bilaterally  Positive seated straight leg raise for bilateral lower extremities  Sensitivity to light touch intact bilateral lower extremities  2+ reflexes in the patella and Achilles    No ankle clonus     Imaging  RIGHT HUMERUS     INDICATION:   RUE discomfort      COMPARISON:  None     VIEWS:  XR HUMERUS RIGHT         FINDINGS:     There is no acute fracture or dislocation      No significant degenerative changes      No lytic or blastic osseous lesion      Soft tissues are unremarkable      IMPRESSION:     No acute osseous abnormality

## 2021-04-26 NOTE — PATIENT INSTRUCTIONS
Pregabalin (By mouth)   Pregabalin (pre-GA-ba-albert)  Treats nerve and muscle pain, including fibromyalgia  Also treats partial-onset seizures  Brand Name(s): Lyrica, Lyrica CR   There may be other brand names for this medicine  When This Medicine Should Not Be Used: This medicine is not right for everyone  Do not use it if you had an allergic reaction to pregabalin  How to Use This Medicine:   Capsule, Liquid, Long Acting Tablet  · Take your medicine as directed  Your dose may need to be changed several times to find what works best for you  · Extended-release tablet: Swallow the extended-release tablet whole  Do not crush, break, or chew it  Take it after an evening meal   · Oral liquid: Measure the oral liquid medicine with a marked measuring spoon, oral syringe, or medicine cup  · This medicine should come with a Medication Guide  Ask your pharmacist for a copy if you do not have one  · Missed dose: Take a dose as soon as you remember  If it is almost time for your next dose, wait until then and take a regular dose  Do not take extra medicine to make up for a missed dose  If you miss a dose of the extended-release tablet after your evening meal, take it before bedtime after a snack  If you miss the dose before bedtime, take it after your morning meal  If you do not take the dose the following morning, then take the next dose at your regular time after your evening meal  Do not take 2 doses at the same time  · Store the medicine in a closed container at room temperature, away from heat, moisture, and direct light  Drugs and Foods to Avoid:   Ask your doctor or pharmacist before using any other medicine, including over-the-counter medicines, vitamins, and herbal products  · Some medicines can affect how pregabalin works  Tell your doctor if you are using any of the following:   ? ACE inhibitor (including benazepril, enalapril, lisinopril, quinapril, ramipril)  ?  Oral diabetes medicine (including metformin, pioglitazone, rosiglitazone)  · Do not drink alcohol while you are using this medicine  · Tell your doctor if you use anything else that makes you sleepy  Some examples are allergy medicine, narcotic pain medicine, and alcohol  Tell your doctor if you are also using oxycodone, lorazepam, or zolpidem  Warnings While Using This Medicine:   · Tell your doctor if you are pregnant or breastfeeding, or if you have kidney disease, heart failure, heart rhythm problems, lung or breathing problems, a bleeding disorder, diabetes, sores or skin problems, or a low blood platelet count  Tell your doctor if you have a history of angioedema (severe swelling), alcohol or drug abuse, depression, or other mood problems  · This medicine may cause the following problems:   ? Angioedema (severe swelling), which may be life-threatening  ? Changes in mood or behavior, including suicidal thoughts or behavior  ? Respiratory depression (serious breathing problem that can be life-threatening), when used with narcotic pain medicines  ? Peripheral edema (swelling of your hands, ankles, feet, or lower legs)  ? Increased risk for cancer and bleeding  ? Serious muscle problems  ? Heart rhythm changes  · This medicine may make you dizzy or drowsy  It may also cause blurry or double vision  Do not drive or do anything else that could be dangerous until you know how this medicine affects you  · Do not stop using this medicine suddenly  Your doctor will need to slowly decrease your dose before you stop it completely  · Your doctor will do lab tests at regular visits to check on the effects of this medicine  Keep all appointments  · Keep all medicine out of the reach of children  Never share your medicine with anyone    Possible Side Effects While Using This Medicine:   Call your doctor right away if you notice any of these side effects:  · Allergic reaction: Itching or hives, swelling in your face or hands, swelling or tingling in your mouth or throat, chest tightness, trouble breathing  · Blistering, peeling, red skin rash  · Blue lips, fingernails, or skin, trouble breathing, chest pain  · Blurry or double vision  · Fever, chills, cough, sore throat, body aches  · Muscle pain, tenderness, or weakness, general feeling of illness  · Rapid weight gain, swelling in your hands, ankles, or feet  · Severe dizziness or drowsiness  · Sudden mood changes, unusual moods or behavior, including extreme happiness or depression, thoughts or attempts of killing oneself  · Swelling in your throat, head, or neck  · Uneven heartbeat  · Unusual bleeding, bruising, or weakness  If you notice these less serious side effects, talk with your doctor:   · Confusion, trouble concentrating  · Constipation  · Dry mouth  If you notice other side effects that you think are caused by this medicine, tell your doctor  Call your doctor for medical advice about side effects  You may report side effects to FDA at 1-960-FDA-7310  © Copyright [address] Information is for End User's use only and may not be sold, redistributed or otherwise used for commercial purposes  The above information is an  only  It is not intended as medical advice for individual conditions or treatments  Talk to your doctor, nurse or pharmacist before following any medical regimen to see if it is safe and effective for you

## 2021-04-26 NOTE — TELEPHONE ENCOUNTER
Please call patient and ask him if he is taking prevacid or prilosec as they are both listed and if he is taking lisinopril or lisinopril-hctz

## 2021-04-27 RX ORDER — MELATONIN
2000 DAILY
Qty: 12 TABLET | Refills: 0 | Status: SHIPPED | OUTPATIENT
Start: 2021-04-27

## 2021-04-27 RX ORDER — LISINOPRIL 10 MG/1
10 TABLET ORAL DAILY
Qty: 90 TABLET | Refills: 3 | Status: ON HOLD | OUTPATIENT
Start: 2021-04-27 | End: 2021-06-24 | Stop reason: SDUPTHER

## 2021-04-27 RX ORDER — LANSOPRAZOLE 30 MG/1
30 CAPSULE, DELAYED RELEASE ORAL DAILY
Qty: 90 CAPSULE | Refills: 3 | Status: ON HOLD | OUTPATIENT
Start: 2021-04-27 | End: 2021-06-24 | Stop reason: SDUPTHER

## 2021-05-04 ENCOUNTER — TELEPHONE (OUTPATIENT)
Dept: PAIN MEDICINE | Facility: CLINIC | Age: 61
End: 2021-05-04

## 2021-05-04 DIAGNOSIS — M54.2 NECK PAIN: ICD-10-CM

## 2021-05-04 DIAGNOSIS — R29.898 RUE WEAKNESS: ICD-10-CM

## 2021-05-04 DIAGNOSIS — M54.12 CERVICAL RADICULOPATHY: Primary | ICD-10-CM

## 2021-05-04 NOTE — TELEPHONE ENCOUNTER
RN s/w pt's nephew Humberto Titus (ok per RAJI) and advised of same   Humberto Titus requested to have script mailed   Pt's address confirmed and script mailed per request

## 2021-05-04 NOTE — TELEPHONE ENCOUNTER
----- Message from Quan Wilcox RN sent at 5/4/2021  1:25 PM EDT -----  Regarding: FW: Eliezer Lynn  RN attempted to reach pt and spoke with pt's nephew Isabelle Marshal (ok per RAJI) who advised pt has been doing PT due to his recent RLE amputation but he was not sure about his Cervical spine  RN s/w Ashley at [facility] [phone] [address] [facility] [place] who informed RN pt did not have PT on his Cervical spine but their agency can provide this type of PT in the pt's home if it is ordered  Per nephew pt has difficulty getting out of the house so they prefer in home [address] [address]    ----- Message -----  From: VERONICA Rangel  Sent: 5/4/2021  12:19 PM EDT  To: Quan Wilcox RN  Subject: RE: Janki Castro,   Any please call him and find out if he did physical therapy specifically for his cervical spine  Needs to have done 6 weeks of physical therapy in the last 6 months for his cervical spine  Otherwise, he will need to participate in physical therapy for 6 weeks before we can even try to get the MRI proved  He has no physical exam findings that would expedite the approval   Davidson Mallory  ----- Message -----  From: Quan Wilcox RN  Sent: 5/4/2021  10:31 AM EDT  To: VERONICA Rangel  Subject: FW: Eliezer Lynn                                         ----- Message -----  From: Jeremiah Cardozo  Sent: 5/4/2021   9:13 AM EDT  To: [facility]  Subject: FW: JARAD tmo team,     Can you please provide me with updates on this? I see the in-basket was marked as "done", but I haven't heard or seen any updates  Thanks so much, Yamileth Olvera  ----- Message -----  From: Jeremiah Cardozo  Sent: 5/3/2021   9:50 AM EDT  To: [facility]  Subject: JARAD Ramachandran morning team,     [place]y ([place] [facility] is denying his MRI cervical spine    Please see the below correspondence I have received and reply with plan of care  Thank you,  Dave  ________________________________________________________________________    At this time Doctor is stating the requested image Ng study is not medically necessary because initial xrays of the cervical spine should be completed and possibly nerve conduction studies after which a full course of supervised conservative multi modality therapy, including both pharmacological and non pharmacological modalities such as structured and documented home exercise program or physical therapy of at least duration with subsequent re evaluation of the patient documenting failure to improve

## 2021-05-04 NOTE — TELEPHONE ENCOUNTER
Ordered physical therapy for his cervical spine  I made a notation for "home PT" in the notes section

## 2021-05-11 ENCOUNTER — OFFICE VISIT (OUTPATIENT)
Dept: VASCULAR SURGERY | Facility: CLINIC | Age: 61
End: 2021-05-11
Payer: COMMERCIAL

## 2021-05-11 VITALS
SYSTOLIC BLOOD PRESSURE: 108 MMHG | TEMPERATURE: 99 F | DIASTOLIC BLOOD PRESSURE: 70 MMHG | HEIGHT: 68 IN | BODY MASS INDEX: 22.12 KG/M2 | HEART RATE: 91 BPM

## 2021-05-11 DIAGNOSIS — S78.111A ABOVE-KNEE AMPUTATION OF RIGHT LOWER EXTREMITY (HCC): Primary | ICD-10-CM

## 2021-05-11 PROCEDURE — 99212 OFFICE O/P EST SF 10 MIN: CPT | Performed by: SURGERY

## 2021-05-11 NOTE — ASSESSMENT & PLAN NOTE
Approximately 1 month status post right above knee amputation  He is doing well has been using or  and is an appointment with the prosthetist in the near future  With complaining of some swelling in the left lower extremity its modest at best has excellent Doppler sounds dorsalis pedis posterior tibial and peroneal       Plan:  Continue with elevation of left lower extremity, utilize Tubigrip for mild support and continue working with prosthetist and pain management regarding discomfort he is having in the stump site likely neurogenic in etiology

## 2021-05-11 NOTE — PROGRESS NOTES
Assessment/Plan:    Approximately 1 month status post right above knee amputation  He is doing well has been using or  and is an appointment with the prosthetist in the near future  With complaining of some swelling in the left lower extremity its modest at best has excellent Doppler sounds dorsalis pedis posterior tibial and peroneal       Plan:  Continue with elevation of left lower extremity, utilize Tubigrip for mild support and continue working with prosthetist and pain management regarding discomfort he is having in the stump site likely neurogenic in etiology  Diagnoses and all orders for this visit:    Above-knee amputation of right lower extremity (HCC)        Subjective:      Patient ID: Martha Pennington is a 59 y o  male  Pt is here today for a 1 month f/u exam  Pt is s/p right AKA done by Dr Robbin Daniels on 1/14/2021  Also a AKA formalization was done 1/18/2021 by Dr Robbin Daniels  Pt complained last visit that he had pain in the right stump at distal femur  Pt fell of his bed about 2-3 weeks ago and bruised the right thigh  He c/o swelling and pain in his left foot  He has had no recent testing  Pt is taking Eliquis  He is a former smoker  HPI    The following portions of the patient's history were reviewed and updated as appropriate: allergies, current medications, past family history, past medical history, past social history, past surgical history and problem list     Review of Systems   Constitutional: Negative  HENT: Negative  Eyes: Negative  Respiratory: Negative  Cardiovascular: Positive for leg swelling  Left foot swelling   Gastrointestinal: Negative  Endocrine: Negative  Genitourinary: Positive for frequency  Musculoskeletal: Positive for gait problem  Skin: Negative  Allergic/Immunologic: Negative  Neurological: Positive for numbness  Hematological: Bruises/bleeds easily  Psychiatric/Behavioral: The patient is nervous/anxious  Objective: There were no vitals taken for this visit  Physical Exam   Right above knee amputation stump is well healed minimal swelling, left lower extremity with excellent Doppler sounds posterior tibial dorsalis pedis and peroneal, mild swelling with venous congestion  I have reviewed and made appropriate changes to the review of systems input by the medical assistant      Vitals:    05/11/21 1515   BP: 108/70   BP Location: Right arm   Patient Position: Sitting   Cuff Size: Standard   Pulse: 91   Temp: 99 °F (37 2 °C)   TempSrc: Tympanic   Height: 5' 8" (1 727 m)       Patient Active Problem List   Diagnosis    Chronic hepatitis C without hepatic coma (HCC)    HTN (hypertension)    Hyperlipidemia associated with type 2 diabetes mellitus (HCC)    Methadone maintenance therapy patient ([facility] )    Type 2 diabetes mellitus with diabetic polyneuropathy ([facility])    Callous ulcer, limited to breakdown of skin ([facility] )    Bilateral lower extremity edema    BMI 28 0-28 9,adult    Positive depression screening    Mononeuropathy, unspecified    Other and unspecified noninfectious gastroenteritis and colitis    Mixed hyperlipidemia    Proteinuria    Tobacco abuse counseling    Vitamin D deficiency    SOB (shortness of breath) on exertion    Type 2 diabetes mellitus, with long-term current use of insulin (HCC)    Acute respiratory failure with hypoxia (HCC)    Sepsis due to COVID-19 ([facility] )    Pneumonia due to COVID-19 virus    Acute pancreatitis    Hematuria    Aortic thrombus (HCC)    Anemia    Muscle weakness of right upper extremity    Oropharyngeal dysphagia    Edema of right upper extremity    Ischemia of right lower extremity with suspected rhabdomyolysis    Right femoral vein DVT (HCC)    Left Hydropneumothorax    RUE weakness    Prolonged Q-T interval on ECG    Hypoxia    Empyema lung (HCC)    Stage II pressure ulcer of sacral region (HCC)    S/P AKA (above knee amputation), right ([facility] )    Neck pain    Cervical radiculopathy    Chronic bilateral low back pain    Above-knee amputation of right lower extremity ([facility] )       Past Surgical History:   Procedure Laterality Date    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    Procedure: AMPUTATION ABOVE KNEE (AKA);   Surgeon: Ajit Joel MD;  [facility];  Service: Vascular    AMPUTATION ABOVE KNEE (AKA) Right 1/18/2021    Procedure: AMPUTATION ABOVE KNEE (AKA) FORMALIZATION,  R AKA wound washout, wound closure;  Surgeon: Ajit Joel MD;  [facility];  Service: Vascular    ARTERIOGRAM Right 1/13/2021    Procedure: ARTERIOGRAM;  Surgeon: Marj Abad DO;  [facility];  Service: Vascular    IR CHEST TUBE PLACEMENT  2/10/2021    IR LOWER EXTREMITY ANGIOGRAM  1/14/2021    THROMBECTOMY W/ EMBOLECTOMY Right 1/13/2021    Procedure: EMBOLECTOMY/THROMBECTOMY LOWER EXTREMITY;  Surgeon: Marj Abad DO;  [facility];  Service: Vascular       Family History   Problem Relation Age of Onset    Diabetes Mother     Hypertension Father     Leukemia Father     Acute lymphoblastic leukemia Father     Cancer Sister        Social History     Socioeconomic History    Marital status:      Spouse name: Not on file    Number of children: Not on file    Years of education: Not on file    Highest education level: Not on file   Occupational History    Not on file   Social Needs    Financial resource strain: Not on file    Food insecurity     Worry: Not on file     Inability: Not on file   [employer] needs     Medical: Not on file     Non-medical: Not on file   Tobacco Use    Smoking status: Former Smoker    Smokeless tobacco: Never Used   Substance and Sexual Activity    Alcohol use: Not Currently    Drug use: No     Comment: methadone    Sexual activity: Not on file   Lifestyle    Physical activity     Days per week: Not on file     Minutes per session: Not on file    Stress: Not on file   Relationships    Social connections     Talks on phone: Not on file     Gets together: Not on file     Attends Gnosticism service: Not on file     Active member of club or organization: Not on file     Attends meetings of clubs or organizations: Not on file     Relationship status: Not on file    Intimate partner violence     Fear of current or ex partner: Not on file     Emotionally abused: Not on file     Physically abused: Not on file     Forced sexual activity: Not on file   Other Topics Concern    Not on file   Social History Narrative    Not on file       Allergies   Allergen Reactions    Varenicline          Current Outpatient Medications:     acetaminophen (TYLENOL) 325 mg tablet, Take 3 tablets (975 mg total) by mouth every 8 (eight) hours, Disp: , Rfl: 0    albuterol (PROVENTIL HFA,VENTOLIN HFA) 90 mcg/act inhaler, Inhale 2 puffs every 4 (four) hours as needed for wheezing, Disp: , Rfl: 0    apixaban (ELIQUIS) 5 mg, Take 1 tablet (5 mg total) by mouth 2 (two) times a day, Disp: 60 tablet, Rfl: 3    bisacodyl (DULCOLAX) 10 mg suppository, Insert 1 suppository (10 mg total) into the rectum daily as needed for constipation, Disp: 12 suppository, Rfl: 0    Blood Glucose Monitoring Suppl ([facility]) w/Device KIT, Use to test blood sugars 3 times daily, Disp: 1 kit, Rfl: 0    cetirizine (ZyrTEC) 10 mg tablet, Take 10 mg by mouth daily, Disp: , Rfl:     cholecalciferol (VITAMIN D3) 1,000 units tablet, Take 2 tablets (2,000 Units total) by mouth daily, Disp: 12 tablet, Rfl: 0    Diclofenac Sodium (VOLTAREN) 1 %, Apply 4 g topically 4 (four) times a day, Disp: , Rfl: 0    Empagliflozin (Jardiance) 25 MG TABS, Take 1 tablet (25 mg total) by mouth every morning, Disp: 90 tablet, Rfl: 1    fluticasone (FLONASE) 50 mcg/act nasal spray, 1 spray into each nostril daily, Disp: , Rfl:     ibuprofen (MOTRIN) 400 mg tablet, Take 400 mg by mouth every 8 (eight) hours as needed, Disp: , Rfl:     insulin glargine (LANTUS) 100 units/mL subcutaneous injection, Inject 4 Units under the skin daily at bedtime, Disp: , Rfl: 0    Insulin Pen Needle 31G X 5 MM MISC, by Does not apply route daily Pt to inject 4 X daily, Disp: 100 each, Rfl: 5    Insulin Pen Needle 31G X 5 MM MISC, 30 units sq 2X daily, Disp: 100 each, Rfl: 3    isosorbide mononitrate (IMDUR) 120 mg 24 hr tablet, , Disp: , Rfl:     lansoprazole (PREVACID) 30 mg capsule, Take 1 capsule (30 mg total) by mouth daily, Disp: 90 capsule, Rfl: 3    lisinopril (ZESTRIL) 10 mg tablet, Take 1 tablet (10 mg total) by mouth daily, Disp: 90 tablet, Rfl: 3    menthol-methyl salicylate (BENGAY) 90-10 % cream, Apply topically 4 (four) times a day as needed (torticollis), Disp: , Rfl: 0    methadone (DOLOPHINE) 10 mg tablet, Take 7 tablets by mouth daily Continuation of care,, Disp: 21 tablet, Rfl: 0    multivitamin-minerals (CENTRUM ADULTS) tablet, Take 1 tablet by mouth daily, Disp: , Rfl: 0    polyethylene glycol (MIRALAX) 17 g packet, Take 17 g by mouth daily, Disp: , Rfl: 0    pregabalin (LYRICA) 50 mg capsule, Take 1 capsule (50 mg total) by mouth 3 (three) times a day, Disp: 90 capsule, Rfl: 1    sitaGLIPtin-metFORMIN (JANUMET)  MG per tablet, Take 1 tablet by mouth 2 (two) times a day with meals, Disp: 180 tablet, Rfl: 2    apixaban (ELIQUIS) 5 mg, Take 1 tablet (5 mg total) by mouth 2 (two) times a day, Disp: 60 tablet, Rfl: 3    budesonide (PULMICORT) 0 5 mg/2 mL nebulizer solution, Take 1 vial (0 5 mg total) by nebulization every 12 (twelve) hours Rinse mouth after use   (Patient not taking: Reported on 3/29/2021), Disp: , Rfl: 0    formoterol (PERFOROMIST) 20 MCG/2ML nebulizer solution, Take 1 vial (20 mcg total) by nebulization every 12 (twelve) hours (Patient not taking: Reported on 3/29/2021), Disp: , Rfl: 0    glycopyrrolate (ROBINUL) 1 mg tablet, Take 1 tablet (1 mg total) by mouth 3 (three) times a day (Patient not taking: Reported on 3/29/2021), Disp: , Rfl: 0    guaiFENesin (MUCINEX) 600 mg 12 hr tablet, Take 1 tablet (600 mg total) by mouth every 12 (twelve) hours (Patient not taking: Reported on 3/29/2021), Disp: , Rfl: 0    guaiFENesin 1200 MG TB12, Take 1 tablet (1,200 mg total) by mouth every 12 (twelve) hours (Patient not taking: Reported on 3/29/2021), Disp:  , Rfl: 0    ipratropium (ATROVENT) 0 02 % nebulizer solution, Take 1 vial (0 5 mg total) by nebulization every 6 (six) hours (Patient not taking: Reported on 4/26/2021), Disp: , Rfl: 0    ipratropium-albuterol (DUO-NEB) 0 5-2 5 mg/3 mL nebulizer solution, Take 1 vial (3 mL total) by nebulization 4 (four) times a day (Patient not taking: Reported on 3/29/2021), Disp: 120 vial, Rfl: 3    levalbuterol (XOPENEX) 1 25 mg/0 5 mL nebulizer solution, Take 0 5 mL (1 25 mg total) by nebulization every 6 (six) hours (Patient not taking: Reported on 4/6/2021), Disp: , Rfl: 0    Lidocaine Viscous HCl (XYLOCAINE) 2 % mucosal solution, Swish and spit 15 mL 4 (four) times a day as needed for mouth pain or discomfort (Patient not taking: Reported on 3/29/2021), Disp: , Rfl: 0    lisinopril-hydrochlorothiazide (PRINZIDE,ZESTORETIC) 20-25 MG per tablet, , Disp: , Rfl:     naloxone (NARCAN) 4 mg/0 1 mL nasal spray, Administer 1 spray into a nostril  If no response after 2-3 minutes, give another dose in the other nostril using a new spray   (Patient not taking: Reported on 5/11/2021), Disp: 1 each, Rfl: 1    omeprazole (PriLOSEC) 20 mg delayed release capsule, , Disp: , Rfl:     Pirfenidone 267 MG CAPS, Take 3 capsules (801 mg total) by mouth 3 (three) times a day (Patient not taking: Reported on 3/29/2021), Disp: 30 capsule, Rfl: 0

## 2021-05-11 NOTE — LETTER
May 11, 2021     [facility], [address] [place] [address]    Patient: Martha Pennington   YOB: 1960   Date of Visit: 5/11/2021       Dear Dr Celia Gregory:    Thank you for referring Martha Pennington to me for evaluation  Below are my notes for this consultation  If you have questions, please do not hesitate to call me  I look forward to following your patient along with you           Sincerely,        Chica Luciano MD        CC: No Recipients

## 2021-05-17 ENCOUNTER — TELEPHONE (OUTPATIENT)
Dept: FAMILY MEDICINE CLINIC | Facility: CLINIC | Age: 61
End: 2021-05-17

## 2021-05-17 ENCOUNTER — TELEPHONE (OUTPATIENT)
Dept: PAIN MEDICINE | Facility: CLINIC | Age: 61
End: 2021-05-17

## 2021-05-17 DIAGNOSIS — I74.10 AORTIC THROMBUS (HCC): ICD-10-CM

## 2021-05-17 NOTE — TELEPHONE ENCOUNTER
Nephew calling to make us aware patient needs refill on oxycodone 5mg Percocet patients next ovs 5/19     He ran out yesterday, it was prescribed by PCP     Please advise    Thank you      [phone]

## 2021-05-17 NOTE — TELEPHONE ENCOUNTER
Patient called requesting a script for Oxycodone 5 mg  Patient states he ran out yesterday and is in severe pain  They stated that we have prescribed it in the past      I explained that we as a practice do not prescribe long term narcotics, he is requesting a script for now         Please advise

## 2021-05-17 NOTE — TELEPHONE ENCOUNTER
Albert Molina ( patient's nephew) notified and he stated  pain management is not willing to prescribe anything else and Enrigue Retort is in severe pain and lyrica only helps a little  advised patient to try to get into a different pain management dr  But it is not recommended to be on narcotics long term

## 2021-05-17 NOTE — TELEPHONE ENCOUNTER
S/W Israel Morillo as per RAJI  Advised him that the pt's PCP prescribes it  Advised him from RM's consult note "We will not provide opioid for pain control secondary to fact that pain is more neuropathic in nature "  He verbalized understanding

## 2021-05-17 NOTE — TELEPHONE ENCOUNTER
Please advise patient again that we do not prescribe long tern narcotics for patient  I only prescribed this in the past to help with his pain until he followed up with his surgeon  Patient was seen by pain management and placed on Lyrica as they too do not believe the narcotics will be beneficial  If patient is having severe pain, he should schedule an appointment with pain management to see if there are any other treatment options for him through their services

## 2021-05-18 ENCOUNTER — TELEPHONE (OUTPATIENT)
Dept: VASCULAR SURGERY | Facility: CLINIC | Age: 61
End: 2021-05-18

## 2021-05-18 NOTE — TELEPHONE ENCOUNTER
Pt's nephew/care giver Kayla Hager called  Pt is s/p R AKA by Dr Kaitlin Covington in January  Pt ran out of oxycodone 5mg 4 days ago  He has seen pain management and they prescribed Lyrica  Please see  notes in pt's chart from pcp and pain management  He states the Lyrica only helps a little and pt is in severe agony w/ R stump pain and has been moaning for days since Oxycodone ran out  He was taking it 4x day     Per [facility] pain management will not prescribe narcotics and pcp also declined to refill  Pt has f/u ov w/ pain management tomorrow and Kayla Hager is looking into finding a different pain management group (he is going to call pt's insurance to see who is in network)  He asked if our practice would provide rx  Explained since he was seeing pain management they should handle his pain medications  He verbalized understanding but is upset pt is suffering and requested I ask  Advised I would send message to our triage provider

## 2021-05-18 NOTE — TELEPHONE ENCOUNTER
Chart reviewed  As she had noted, the patient is being managed by pain management  Under these circumstances, narcotics and other pain medication are managed by pain management  Please advise the patient to contact established pain management physician

## 2021-05-19 ENCOUNTER — OFFICE VISIT (OUTPATIENT)
Dept: PAIN MEDICINE | Facility: CLINIC | Age: 61
End: 2021-05-19
Payer: COMMERCIAL

## 2021-05-19 VITALS
SYSTOLIC BLOOD PRESSURE: 144 MMHG | HEIGHT: 68 IN | BODY MASS INDEX: 22.12 KG/M2 | HEART RATE: 89 BPM | DIASTOLIC BLOOD PRESSURE: 75 MMHG

## 2021-05-19 DIAGNOSIS — G89.4 CHRONIC PAIN SYNDROME: Primary | ICD-10-CM

## 2021-05-19 DIAGNOSIS — Z89.611 S/P AKA (ABOVE KNEE AMPUTATION), RIGHT (HCC): ICD-10-CM

## 2021-05-19 DIAGNOSIS — E11.42 TYPE 2 DIABETES MELLITUS WITH DIABETIC POLYNEUROPATHY, UNSPECIFIED WHETHER LONG TERM INSULIN USE (HCC): ICD-10-CM

## 2021-05-19 DIAGNOSIS — M54.12 CERVICAL RADICULOPATHY: ICD-10-CM

## 2021-05-19 PROCEDURE — 3077F SYST BP >= 140 MM HG: CPT | Performed by: NURSE PRACTITIONER

## 2021-05-19 PROCEDURE — 3078F DIAST BP <80 MM HG: CPT | Performed by: NURSE PRACTITIONER

## 2021-05-19 PROCEDURE — 99214 OFFICE O/P EST MOD 30 MIN: CPT | Performed by: NURSE PRACTITIONER

## 2021-05-19 RX ORDER — PREGABALIN 75 MG/1
75 CAPSULE ORAL 3 TIMES DAILY
Qty: 90 CAPSULE | Refills: 1 | Status: ON HOLD | OUTPATIENT
Start: 2021-05-19 | End: 2021-06-24

## 2021-05-19 RX ORDER — DULOXETIN HYDROCHLORIDE 30 MG/1
30 CAPSULE, DELAYED RELEASE ORAL DAILY
Qty: 30 CAPSULE | Refills: 1 | Status: SHIPPED | OUTPATIENT
Start: 2021-05-19 | End: 2021-06-25 | Stop reason: HOSPADM

## 2021-05-19 NOTE — PROGRESS NOTES
Assessment:  1  Chronic pain syndrome    2  Type 2 diabetes mellitus with diabetic polyneuropathy, unspecified whether long term insulin use ([facility] [address] )    3  S/P AKA (above knee amputation), right (HCC)    4  Cervical radiculopathy        Plan:    While the patient was in the office today, I did have a thorough conversation regarding their chronic pain syndrome, medication management, and treatment plan options  Patient is a 58-year-old male with chronic pain syndrome related to diabetic neuropathy, right above the knee amputation, cervical radiculopathy  He was initially seen here on 04/26/2021 at which time x-rays of his cervical and lumbar spine were ordered  He has not had the x-rays done yet  MRI of his cervical spine was ordered, but denied due to lack of physical therapy for the cervical area  Patient states that at this time he could not attend in-person physical therapy due to physical limitations as well as pain  As such, I provided him with a home exercise program for cervical stretching/strengthening exercises  The exercises were included in his AVS   I advised him to do each exercise 10-20 times daily  He was started on Lyrica 50 mg 3 times daily  He denies any improvement with the use of Lyrica  At this point, we will increase Lyrica 75 mg 3 times daily  A prescription for Lyrica was sent electronically to his pharmacy  I discussed with the patient that since they have a neuropathic component of pain that it would be beneficial to start him on a medication such as Cymbalta  The patient denied being prescribed any antidepressant and/or psychiatric medications  I reviewed with the patient that it may take 3-4 weeks for the medications effects to be noticed and that it should never be abruptly stopped  Possible side effects include, but are not limited to:  Vertigo, lethargy, nausea, and edema of the extremities  Advised patient to call our office if they experience any side effects    The patient verbalized understanding  Start Cymbalta 30 mg at bedtime  Prescription was sent electronically to his pharmacy  the patient and the person accompanying him to his visit were specifically requesting oxycodone  He states that he was previously prescribed oxycodone which was very helpful he does not understand why it was discontinued by his previous provider and he does not understand why we will not prescribe for him  I explained to him that most of his pain is neuropathic in nature  As such, opiates are not the best course of treatment  Patient is interested in trying medical marijuana  I provided him with a list of medical marijuana providers  The patient will follow-up in 1 month for medication prescription refill and reevaluation  The patient was advised to contact the office should their symptoms worsen in the interim  The patient was agreeable and verbalized an understanding  History of Present Illness: The patient is a 59 y o  male who presents for a follow up office visit in regards to Leg Pain, Knee Pain, and Arm Pain  The patients current symptoms include Complaints of right stump pain, left leg pain, numbness in the right upper extremity  Current pain level is a 9/10  Quality pain is described as throbbing, shooting, numb  Current pain medications includes:  Lyrica 50 mg 3 times daily    The patient reports that this regimen is providing 0% pain relief  The patient is reporting no side effects from this pain medication regimen  I have personally reviewed and/or updated the patient's past medical history, past surgical history, family history, social history, current medications, allergies, and vital signs today  Review of Systems  Review of Systems   Constitutional: Negative for fatigue and unexpected weight change  HENT: Negative for dental problem, ear pain, hearing loss and sneezing  Eyes: Negative for visual disturbance     Respiratory: Negative for cough and chest tightness  Cardiovascular: Negative for leg swelling  Gastrointestinal: Negative for anal bleeding  Endocrine: Negative for heat intolerance  Genitourinary: Negative for flank pain and genital sores  Skin: Negative for wound  Allergic/Immunologic: Negative for immunocompromised state  Neurological: Negative for speech difficulty and light-headedness  Hematological: Negative for adenopathy  Psychiatric/Behavioral: Negative for confusion  The patient is not hyperactive  All other systems reviewed and are negative  Past Medical History:   Diagnosis Date    Diabetes mellitus ([facility] )     GERD (gastroesophageal reflux disease)     Hypercholesteremia     Hypertension     Methadone use ([facility] )        Past Surgical History:   Procedure Laterality Date    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    Procedure: AMPUTATION ABOVE KNEE (AKA);   Surgeon: Ida Mock MD;  Location: [facility];  Service: Vascular    AMPUTATION ABOVE KNEE (AKA) Right 1/18/2021    Procedure: AMPUTATION ABOVE KNEE (AKA) FORMALIZATION,  R AKA wound washout, wound closure;  Surgeon: Ida Mock MD;  [facility]: [facility];  Service: Vascular    ARTERIOGRAM Right 1/13/2021    Procedure: ARTERIOGRAM;  Surgeon: Santy Mccabe DO;  Location: [facility];  Service: Vascular    IR CHEST TUBE PLACEMENT  2/10/2021    IR LOWER EXTREMITY ANGIOGRAM  1/14/2021    THROMBECTOMY W/ EMBOLECTOMY Right 1/13/2021    Procedure: EMBOLECTOMY/THROMBECTOMY LOWER EXTREMITY;  Surgeon: Santy Mccabe DO;  [facility]: [facility];  Service: Vascular       Family History   Problem Relation Age of Onset    Diabetes Mother     Hypertension Father     Leukemia Father     Acute lymphoblastic leukemia Father     Cancer Sister        Social History     Occupational History    Not on file   Tobacco Use    Smoking status: Former Smoker    Smokeless tobacco: Never Used   Substance and Sexual Activity    Alcohol use: Not Currently    Drug use: No     Comment: methadone    Sexual activity: Not on file         Current Outpatient Medications:     acetaminophen (TYLENOL) 325 mg tablet, Take 3 tablets (975 mg total) by mouth every 8 (eight) hours, Disp: , Rfl: 0    albuterol (PROVENTIL HFA,VENTOLIN HFA) 90 mcg/act inhaler, Inhale 2 puffs every 4 (four) hours as needed for wheezing, Disp: , Rfl: 0    apixaban (ELIQUIS) 5 mg, Take 1 tablet (5 mg total) by mouth 2 (two) times a day, Disp: 60 tablet, Rfl: 3    apixaban (ELIQUIS) 5 mg, Take 1 tablet (5 mg total) by mouth 2 (two) times a day, Disp: 60 tablet, Rfl: 3    bisacodyl (DULCOLAX) 10 mg suppository, Insert 1 suppository (10 mg total) into the rectum daily as needed for constipation, Disp: 12 suppository, Rfl: 0    Blood Glucose Monitoring Suppl (ONETOUCH VERIO) w/Device KIT, Use to test blood sugars 3 times daily, Disp: 1 kit, Rfl: 0    budesonide (PULMICORT) 0 5 mg/2 mL nebulizer solution, Take 1 vial (0 5 mg total) by nebulization every 12 (twelve) hours Rinse mouth after use   (Patient not taking: Reported on 3/29/2021), Disp: , Rfl: 0    cetirizine (ZyrTEC) 10 mg tablet, Take 10 mg by mouth daily, Disp: , Rfl:     cholecalciferol (VITAMIN D3) 1,000 units tablet, Take 2 tablets (2,000 Units total) by mouth daily, Disp: 12 tablet, Rfl: 0    Diclofenac Sodium (VOLTAREN) 1 %, Apply 4 g topically 4 (four) times a day, Disp: , Rfl: 0    DULoxetine (CYMBALTA) 30 mg delayed release capsule, Take 1 capsule (30 mg total) by mouth daily At bedtime, Disp: 30 capsule, Rfl: 1    Empagliflozin (Jardiance) 25 MG TABS, Take 1 tablet (25 mg total) by mouth every morning, Disp: 90 tablet, Rfl: 1    fluticasone (FLONASE) 50 mcg/act nasal spray, 1 spray into each nostril daily, Disp: , Rfl:     formoterol (PERFOROMIST) 20 MCG/2ML nebulizer solution, Take 1 vial (20 mcg total) by nebulization every 12 (twelve) hours (Patient not taking: Reported on 3/29/2021), Disp: , Rfl: 0    glycopyrrolate (ROBINUL) 1 mg tablet, Take 1 tablet (1 mg total) by mouth 3 (three) times a day (Patient not taking: Reported on 3/29/2021), Disp: , Rfl: 0    guaiFENesin (MUCINEX) 600 mg 12 hr tablet, Take 1 tablet (600 mg total) by mouth every 12 (twelve) hours (Patient not taking: Reported on 3/29/2021), Disp: , Rfl: 0    guaiFENesin 1200 MG TB12, Take 1 tablet (1,200 mg total) by mouth every 12 (twelve) hours (Patient not taking: Reported on 3/29/2021), Disp:  , Rfl: 0    ibuprofen (MOTRIN) 400 mg tablet, Take 400 mg by mouth every 8 (eight) hours as needed, Disp: , Rfl:     insulin glargine (LANTUS) 100 units/mL subcutaneous injection, Inject 4 Units under the skin daily at bedtime, Disp: , Rfl: 0    Insulin Pen Needle 31G X 5 MM MISC, by Does not apply route daily Pt to inject 4 X daily, Disp: 100 each, Rfl: 5    Insulin Pen Needle 31G X 5 MM MISC, 30 units sq 2X daily, Disp: 100 each, Rfl: 3    ipratropium (ATROVENT) 0 02 % nebulizer solution, Take 1 vial (0 5 mg total) by nebulization every 6 (six) hours (Patient not taking: Reported on 4/26/2021), Disp: , Rfl: 0    ipratropium-albuterol (DUO-NEB) 0 5-2 5 mg/3 mL nebulizer solution, Take 1 vial (3 mL total) by nebulization 4 (four) times a day (Patient not taking: Reported on 3/29/2021), Disp: 120 vial, Rfl: 3    isosorbide mononitrate (IMDUR) 120 mg 24 hr tablet, , Disp: , Rfl:     lansoprazole (PREVACID) 30 mg capsule, Take 1 capsule (30 mg total) by mouth daily, Disp: 90 capsule, Rfl: 3    levalbuterol (XOPENEX) 1 25 mg/0 5 mL nebulizer solution, Take 0 5 mL (1 25 mg total) by nebulization every 6 (six) hours (Patient not taking: Reported on 4/6/2021), Disp: , Rfl: 0    Lidocaine Viscous HCl (XYLOCAINE) 2 % mucosal solution, Swish and spit 15 mL 4 (four) times a day as needed for mouth pain or discomfort (Patient not taking: Reported on 3/29/2021), Disp: , Rfl: 0    lisinopril (ZESTRIL) 10 mg tablet, Take 1 tablet (10 mg total) by mouth daily, Disp: 90 tablet, Rfl: 3    lisinopril-hydrochlorothiazide (PRINZIDE,ZESTORETIC) 20-25 MG per tablet, , Disp: , Rfl:     menthol-methyl salicylate (BENGAY) 85-09 % cream, Apply topically 4 (four) times a day as needed (torticollis), Disp: , Rfl: 0    methadone (DOLOPHINE) 10 mg tablet, Take 7 tablets by mouth daily Continuation of care,, Disp: 21 tablet, Rfl: 0    multivitamin-minerals (CENTRUM ADULTS) tablet, Take 1 tablet by mouth daily, Disp: , Rfl: 0    naloxone (NARCAN) 4 mg/0 1 mL nasal spray, Administer 1 spray into a nostril  If no response after 2-3 minutes, give another dose in the other nostril using a new spray  (Patient not taking: Reported on 5/11/2021), Disp: 1 each, Rfl: 1    omeprazole (PriLOSEC) 20 mg delayed release capsule, , Disp: , Rfl:     Pirfenidone 267 MG CAPS, Take 3 capsules (801 mg total) by mouth 3 (three) times a day (Patient not taking: Reported on 3/29/2021), Disp: 30 capsule, Rfl: 0    polyethylene glycol (MIRALAX) 17 g packet, Take 17 g by mouth daily, Disp: , Rfl: 0    pregabalin (LYRICA) 75 mg capsule, Take 1 capsule (75 mg total) by mouth 3 (three) times a day, Disp: 90 capsule, Rfl: 1    sitaGLIPtin-metFORMIN (JANUMET)  MG per tablet, Take 1 tablet by mouth 2 (two) times a day with meals, Disp: 180 tablet, Rfl: 2    Allergies   Allergen Reactions    Varenicline        Physical Exam:    /75   Pulse 89   Ht 5' 8" (1 727 m)   BMI 22 12 kg/m²     Constitutional:normal, well developed, well nourished, alert, in no distress and non-toxic and no overt pain behavior    Eyes:anicteric  HEENT:grossly intact  Neck:supple, symmetric, trachea midline and no masses   Pulmonary:even and unlabored  Cardiovascular:No edema or pitting edema present  Skin:Normal without rashes or lesions and well hydrated  Psychiatric:Mood and affect appropriate  Neurologic:Cranial Nerves II-XII grossly intact  Musculoskeletal:in wheelchair    Imaging  No orders to display No orders of the defined types were placed in this encounter

## 2021-05-29 ENCOUNTER — HOSPITAL ENCOUNTER (OUTPATIENT)
Dept: RADIOLOGY | Facility: HOSPITAL | Age: 61
Discharge: HOME | End: 2021-05-29
Payer: COMMERCIAL

## 2021-05-29 ENCOUNTER — APPOINTMENT (OUTPATIENT)
Dept: LAB | Facility: HOSPITAL | Age: 61
End: 2021-05-29
Payer: COMMERCIAL

## 2021-05-29 DIAGNOSIS — E11.59 TYPE 2 DIABETES MELLITUS WITH OTHER CIRCULATORY COMPLICATION, WITH LONG-TERM CURRENT USE OF INSULIN (HCC): ICD-10-CM

## 2021-05-29 DIAGNOSIS — Z79.4 TYPE 2 DIABETES MELLITUS WITH DIABETIC POLYNEUROPATHY, WITH LONG-TERM CURRENT USE OF INSULIN (HCC): ICD-10-CM

## 2021-05-29 DIAGNOSIS — M54.12 CERVICAL RADICULOPATHY: ICD-10-CM

## 2021-05-29 DIAGNOSIS — E11.42 TYPE 2 DIABETES MELLITUS WITH DIABETIC POLYNEUROPATHY, WITH LONG-TERM CURRENT USE OF INSULIN (HCC): ICD-10-CM

## 2021-05-29 DIAGNOSIS — M54.50 CHRONIC BILATERAL LOW BACK PAIN, UNSPECIFIED WHETHER SCIATICA PRESENT: ICD-10-CM

## 2021-05-29 DIAGNOSIS — Z79.4 TYPE 2 DIABETES MELLITUS WITH OTHER CIRCULATORY COMPLICATION, WITH LONG-TERM CURRENT USE OF INSULIN (HCC): ICD-10-CM

## 2021-05-29 DIAGNOSIS — M54.2 NECK PAIN: ICD-10-CM

## 2021-05-29 DIAGNOSIS — G89.29 CHRONIC BILATERAL LOW BACK PAIN, UNSPECIFIED WHETHER SCIATICA PRESENT: ICD-10-CM

## 2021-05-29 LAB
ALBUMIN SERPL BCP-MCNC: 3.7 G/DL (ref 3.5–5.7)
ALP SERPL-CCNC: 109 U/L (ref 55–165)
ALT SERPL W P-5'-P-CCNC: 24 U/L (ref 7–52)
ANION GAP SERPL CALCULATED.3IONS-SCNC: 6 MMOL/L (ref 4–13)
AST SERPL W P-5'-P-CCNC: 23 U/L (ref 13–39)
BASOPHILS # BLD AUTO: 0 THOUSANDS/ΜL (ref 0–0.1)
BASOPHILS NFR BLD AUTO: 1 % (ref 0–2)
BILIRUB SERPL-MCNC: 0.3 MG/DL (ref 0.2–1)
BUN SERPL-MCNC: 27 MG/DL (ref 7–25)
CALCIUM SERPL-MCNC: 10.1 MG/DL (ref 8.6–10.5)
CHLORIDE SERPL-SCNC: 98 MMOL/L (ref 98–107)
CO2 SERPL-SCNC: 30 MMOL/L (ref 21–31)
CREAT SERPL-MCNC: 0.78 MG/DL (ref 0.7–1.3)
EOSINOPHIL # BLD AUTO: 0.2 THOUSAND/ΜL (ref 0–0.61)
EOSINOPHIL NFR BLD AUTO: 4 % (ref 0–5)
ERYTHROCYTE [DISTWIDTH] IN BLOOD BY AUTOMATED COUNT: 15.5 % (ref 11.5–14.5)
EST. AVERAGE GLUCOSE BLD GHB EST-MCNC: 174 MG/DL
GFR SERPL CREATININE-BSD FRML MDRD: 96 ML/MIN/1.73SQ M
GLUCOSE P FAST SERPL-MCNC: 174 MG/DL (ref 65–99)
HBA1C MFR BLD: 7.7 %
HCT VFR BLD AUTO: 37 % (ref 42–47)
HGB BLD-MCNC: 12.2 G/DL (ref 14–18)
LYMPHOCYTES # BLD AUTO: 1.8 THOUSANDS/ΜL (ref 0.6–4.47)
LYMPHOCYTES NFR BLD AUTO: 29 % (ref 21–51)
MCH RBC QN AUTO: 27 PG (ref 26–34)
MCHC RBC AUTO-ENTMCNC: 33.1 G/DL (ref 31–37)
MCV RBC AUTO: 82 FL (ref 81–99)
MONOCYTES # BLD AUTO: 0.6 THOUSAND/ΜL (ref 0.17–1.22)
MONOCYTES NFR BLD AUTO: 10 % (ref 2–12)
NEUTROPHILS # BLD AUTO: 3.5 THOUSANDS/ΜL (ref 1.4–6.5)
NEUTS SEG NFR BLD AUTO: 57 % (ref 42–75)
PLATELET # BLD AUTO: 211 THOUSANDS/UL (ref 149–390)
PMV BLD AUTO: 8.8 FL (ref 8.6–11.7)
POTASSIUM SERPL-SCNC: 4.9 MMOL/L (ref 3.5–5.5)
PROT SERPL-MCNC: 8.5 G/DL (ref 6.4–8.9)
RBC # BLD AUTO: 4.53 MILLION/UL (ref 4.3–5.9)
SODIUM SERPL-SCNC: 134 MMOL/L (ref 134–143)
WBC # BLD AUTO: 6.2 THOUSAND/UL (ref 4.8–10.8)

## 2021-05-29 PROCEDURE — 72050 X-RAY EXAM NECK SPINE 4/5VWS: CPT

## 2021-05-29 PROCEDURE — 3051F HG A1C>EQUAL 7.0%<8.0%: CPT | Performed by: NURSE PRACTITIONER

## 2021-05-29 PROCEDURE — 80053 COMPREHEN METABOLIC PANEL: CPT

## 2021-05-29 PROCEDURE — 85025 COMPLETE CBC W/AUTO DIFF WBC: CPT

## 2021-05-29 PROCEDURE — 36415 COLL VENOUS BLD VENIPUNCTURE: CPT

## 2021-05-29 PROCEDURE — 72110 X-RAY EXAM L-2 SPINE 4/>VWS: CPT

## 2021-05-29 PROCEDURE — 83036 HEMOGLOBIN GLYCOSYLATED A1C: CPT

## 2021-06-01 ENCOUNTER — OFFICE VISIT (OUTPATIENT)
Dept: FAMILY MEDICINE CLINIC | Facility: CLINIC | Age: 61
End: 2021-06-01
Payer: COMMERCIAL

## 2021-06-01 VITALS
HEIGHT: 68 IN | HEART RATE: 89 BPM | BODY MASS INDEX: 22.12 KG/M2 | SYSTOLIC BLOOD PRESSURE: 132 MMHG | DIASTOLIC BLOOD PRESSURE: 70 MMHG | TEMPERATURE: 97.9 F | OXYGEN SATURATION: 96 %

## 2021-06-01 DIAGNOSIS — M25.572 ACUTE LEFT ANKLE PAIN: ICD-10-CM

## 2021-06-01 DIAGNOSIS — I74.10 AORTIC THROMBUS (HCC): ICD-10-CM

## 2021-06-01 DIAGNOSIS — Z23 ENCOUNTER FOR IMMUNIZATION: Primary | ICD-10-CM

## 2021-06-01 DIAGNOSIS — E11.42 TYPE 2 DIABETES MELLITUS WITH DIABETIC POLYNEUROPATHY, WITH LONG-TERM CURRENT USE OF INSULIN (HCC): ICD-10-CM

## 2021-06-01 DIAGNOSIS — Z79.4 TYPE 2 DIABETES MELLITUS WITH DIABETIC POLYNEUROPATHY, WITH LONG-TERM CURRENT USE OF INSULIN (HCC): ICD-10-CM

## 2021-06-01 DIAGNOSIS — D64.9 ANEMIA, UNSPECIFIED TYPE: ICD-10-CM

## 2021-06-01 DIAGNOSIS — E11.9 TYPE 2 DIABETES MELLITUS WITHOUT COMPLICATION, WITHOUT LONG-TERM CURRENT USE OF INSULIN (HCC): ICD-10-CM

## 2021-06-01 DIAGNOSIS — Z00.00 ANNUAL PHYSICAL EXAM: ICD-10-CM

## 2021-06-01 PROCEDURE — 99213 OFFICE O/P EST LOW 20 MIN: CPT | Performed by: NURSE PRACTITIONER

## 2021-06-01 PROCEDURE — 99396 PREV VISIT EST AGE 40-64: CPT | Performed by: NURSE PRACTITIONER

## 2021-06-01 PROCEDURE — 1036F TOBACCO NON-USER: CPT | Performed by: NURSE PRACTITIONER

## 2021-06-01 RX ORDER — INSULIN GLARGINE 100 [IU]/ML
17 INJECTION, SOLUTION SUBCUTANEOUS
Qty: 10 ML | Refills: 0
Start: 2021-06-01 | End: 2021-06-03 | Stop reason: SDUPTHER

## 2021-06-01 RX ORDER — PREGABALIN 50 MG/1
CAPSULE ORAL
COMMUNITY
Start: 2021-05-23 | End: 2021-06-01

## 2021-06-01 RX ORDER — INSULIN GLARGINE 100 [IU]/ML
17 INJECTION, SOLUTION SUBCUTANEOUS
Qty: 10 ML | Refills: 0
Start: 2021-06-01 | End: 2021-06-01

## 2021-06-01 NOTE — PROGRESS NOTES
ADULT ANNUAL [address] [facility]    NAME: Mona Bowen  AGE: 59 y o  SEX: male  : 1960     DATE: 2021     Assessment and Plan:     Problem List Items Addressed This Visit        Endocrine    Type 2 diabetes mellitus with diabetic polyneuropathy (HCC)    Relevant Medications    sitaGLIPtin-metFORMIN (JANUMET)  MG per tablet    insulin glargine (LANTUS) 100 units/mL subcutaneous injection    Other Relevant Orders    HEMOGLOBIN A1C W/ EAG ESTIMATION    Comprehensive metabolic panel       Cardiovascular and Mediastinum    Aortic thrombus (HCC)    Relevant Medications    insulin glargine (LANTUS) 100 units/mL subcutaneous injection       Other    Anemia    Relevant Orders    CBC and differential    Iron Panel (Includes Ferritin, Iron Sat%, Iron, and TIBC)    Vitamin B12      Other Visit Diagnoses     Encounter for immunization    -  Primary    Acute left ankle pain        Relevant Orders    XR ankle 3+ vw left    Type 2 diabetes mellitus without complication, without long-term current use of insulin (HCC)        Pt will return to care in 1 week     Relevant Medications    sitaGLIPtin-metFORMIN (JANUMET)  MG per tablet    insulin glargine (LANTUS) 100 units/mL subcutaneous injection    Insulin Pen Needle 31G X 5 MM MISC    Annual physical exam              Immunizations and preventive care screenings were discussed with patient today  Appropriate education was printed on patient's after visit summary  Counseling:  Dental Health: discussed importance of regular tooth brushing, flossing, and dental visits  Sexual health: discussed sexually transmitted diseases, partner selection, use of condoms, avoidance of unintended pregnancy, and contraceptive alternatives  · Exercise: the importance of regular exercise/physical activity was discussed  Recommend exercise 3-5 times per week for at least 30 minutes  BMI Counseling:  Body mass index is 22 12 kg/m²  The BMI is above normal  Nutrition recommendations include decreasing portion sizes, decreasing fast food intake, consuming healthier snacks, limiting drinks that contain sugar, moderation in carbohydrate intake, increasing intake of lean protein and reducing intake of saturated and trans fat  Exercise recommendations include moderate physical activity 150 minutes/week  No pharmacotherapy was ordered  Depression Screening and Follow-up Plan: Patient's depression screening was positive with a PHQ-2 score of 2  Patient assessed for underlying major depression  Brief counseling provided and recommend additional follow-up/re-evaluation next office visit  Return in about 3 months (around 9/1/2021), or if symptoms worsen or fail to improve, for Recheck  Chief Complaint:     Chief Complaint   Patient presents with    Physical Exam     lab review- discuss physical therapy      History of Present Illness:     Adult Annual Physical   Patient here for a comprehensive physical exam  The patient reports no problems  Diet and Physical Activity  · Diet/Nutrition: well balanced diet, limited fruits/vegetables and adequate whole grain intake  · Exercise: no formal exercise and PT exercises that they taight him- home ligia kirti olivia coming in   Depression Screening  PHQ-9 Depression Screening    PHQ-9:   Frequency of the following problems over the past two weeks:      Little interest or pleasure in doing things: 1 - several days  Feeling down, depressed, or hopeless: 1 - several days  PHQ-2 Score: 2       General Health  · Sleep: gets 4-6 hours of sleep on average  · Hearing: normal - bilateral   · Vision: no vision problems and most recent eye exam >1 year ago  · Dental: no dental visits for >1 year          Health  · Symptoms include: none     Review of Systems:     Review of Systems   Constitutional: Negative for activity change, appetite change, diaphoresis, fatigue, fever and unexpected weight change  HENT: Negative for congestion, mouth sores, rhinorrhea, sinus pain, trouble swallowing and voice change  Eyes: Negative for photophobia and visual disturbance  Respiratory: Negative for apnea, cough, chest tightness, shortness of breath and wheezing  Cardiovascular: Negative for chest pain, palpitations and leg swelling  Gastrointestinal: Negative for abdominal distention, abdominal pain, blood in stool, constipation, diarrhea, nausea and vomiting  Endocrine: Negative for cold intolerance, heat intolerance, polydipsia, polyphagia and polyuria  Genitourinary: Negative for decreased urine volume, difficulty urinating, frequency and urgency  Musculoskeletal: Positive for arthralgias  Negative for myalgias, neck pain and neck stiffness  Skin: Negative for color change, rash and wound  Neurological: Negative for dizziness, weakness, light-headedness, numbness and headaches  Hematological: Negative for adenopathy  Does not bruise/bleed easily  Psychiatric/Behavioral: Negative for self-injury, sleep disturbance and suicidal ideas  The patient is not nervous/anxious  Past Medical History:     Past Medical History:   Diagnosis Date    Diabetes mellitus ([place] [address] )     GERD (gastroesophageal reflux disease)     Hypercholesteremia     Hypertension     Methadone use ([place] [address] )       Past Surgical History:     Past Surgical History:   Procedure Laterality Date    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    Procedure: AMPUTATION ABOVE KNEE (AKA);   Surgeon: Roland Franklin MD;  [facility];  Service: Vascular    AMPUTATION ABOVE KNEE (AKA) Right 1/18/2021    Procedure: AMPUTATION ABOVE KNEE (AKA) FORMALIZATION,  R AKA wound washout, wound closure;  Surgeon: Roland Franklin MD;  [facility];  Service: Vascular    ARTERIOGRAM Right 1/13/2021    Procedure: ARTERIOGRAM;  Surgeon: Yifan Daugherty DO;  [facility];  Service: Vascular  IR CHEST TUBE PLACEMENT  2/10/2021    IR LOWER EXTREMITY ANGIOGRAM  1/14/2021    THROMBECTOMY W/ EMBOLECTOMY Right 1/13/2021    Procedure: EMBOLECTOMY/THROMBECTOMY LOWER EXTREMITY;  Surgeon: Mayur Barbour DO;  [facility];  Service: Vascular      Family History:     Family History   Problem Relation Age of Onset    Diabetes Mother     Hypertension Father     Leukemia Father     Acute lymphoblastic leukemia Father     Cancer Sister       Social History:     E-Cigarette/Vaping    E-Cigarette Use Never User      E-Cigarette/Vaping Substances    Nicotine No     THC No     CBD No     Flavoring No     Other No     Unknown No      Social History     Socioeconomic History    Marital status:      Spouse name: None    Number of children: None    Years of education: None    Highest education level: None   Occupational History    None   Social Needs    Financial resource strain: None    Food insecurity     Worry: None     Inability: None    Transportation needs     Medical: None     Non-medical: None   Tobacco Use    Smoking status: Former Smoker    Smokeless tobacco: Never Used   Substance and Sexual Activity    Alcohol use: Not Currently    Drug use: No     Comment: methadone    Sexual activity: None   Lifestyle    Physical activity     Days per week: None     Minutes per session: None    Stress: None   Relationships    Social connections     Talks on phone: None     Gets together: None     Attends [employer] service: None     Active member of club or organization: None     Attends meetings of clubs or organizations: None     Relationship status: None    Intimate partner violence     Fear of current or ex partner: None     Emotionally abused: None     Physically abused: None     Forced sexual activity: None   Other Topics Concern    None   Social History Narrative    None      Current Medications:     Current Outpatient Medications   Medication Sig Dispense Refill    acetaminophen (TYLENOL) 325 mg tablet Take 3 tablets (975 mg total) by mouth every 8 (eight) hours  0    albuterol (PROVENTIL HFA,VENTOLIN HFA) 90 mcg/act inhaler Inhale 2 puffs every 4 (four) hours as needed for wheezing  0    apixaban (ELIQUIS) 5 mg Take 1 tablet (5 mg total) by mouth 2 (two) times a day 60 tablet 3    bisacodyl (DULCOLAX) 10 mg suppository Insert 1 suppository (10 mg total) into the rectum daily as needed for constipation 12 suppository 0    Blood Glucose Monitoring Suppl (ONETOUCH VERIO) w/Device KIT Use to test blood sugars 3 times daily 1 kit 0    cetirizine (ZyrTEC) 10 mg tablet Take 10 mg by mouth daily      cholecalciferol (VITAMIN D3) 1,000 units tablet Take 2 tablets (2,000 Units total) by mouth daily 12 tablet 0    Diclofenac Sodium (VOLTAREN) 1 % Apply 4 g topically 4 (four) times a day  0    DULoxetine (CYMBALTA) 30 mg delayed release capsule Take 1 capsule (30 mg total) by mouth daily At bedtime 30 capsule 1    Empagliflozin (Jardiance) 25 MG TABS Take 1 tablet (25 mg total) by mouth every morning 90 tablet 1    fluticasone (FLONASE) 50 mcg/act nasal spray 1 spray into each nostril daily      insulin glargine (LANTUS) 100 units/mL subcutaneous injection Inject 17 Units under the skin daily at bedtime 10 mL 0    Insulin Pen Needle 31G X 5 MM MISC 30 units sq 2X daily 100 each 3    Insulin Pen Needle 31G X 5 MM MISC Use daily Pt to inject 4 X daily 100 each 5    lansoprazole (PREVACID) 30 mg capsule Take 1 capsule (30 mg total) by mouth daily 90 capsule 3    lisinopril (ZESTRIL) 10 mg tablet Take 1 tablet (10 mg total) by mouth daily 90 tablet 3    menthol-methyl salicylate (BENGAY) 00-59 % cream Apply topically 4 (four) times a day as needed (torticollis)  0    methadone (DOLOPHINE) 10 mg tablet Take 7 tablets by mouth daily Continuation of care, 21 tablet 0    multivitamin-minerals (CENTRUM ADULTS) tablet Take 1 tablet by mouth daily  0    pregabalin (LYRICA) 75 mg capsule Take 1 capsule (75 mg total) by mouth 3 (three) times a day 90 capsule 1    sitaGLIPtin-metFORMIN (JANUMET)  MG per tablet Take 1 tablet by mouth 2 (two) times a day with meals 180 tablet 2    apixaban (ELIQUIS) 5 mg Take 1 tablet (5 mg total) by mouth 2 (two) times a day (Patient not taking: Reported on 6/1/2021) 60 tablet 3    isosorbide mononitrate (IMDUR) 120 mg 24 hr tablet       naloxone (NARCAN) 4 mg/0 1 mL nasal spray Administer 1 spray into a nostril  If no response after 2-3 minutes, give another dose in the other nostril using a new spray  (Patient not taking: Reported on 5/11/2021) 1 each 1     No current facility-administered medications for this visit  Allergies: Allergies   Allergen Reactions    Varenicline       Physical Exam:     /70 (BP Location: Right arm, Patient Position: Sitting)   Pulse 89   Temp 97 9 °F (36 6 °C) (Tympanic)   Ht 5' 8" (1 727 m)   SpO2 96%   BMI 22 12 kg/m²     Physical Exam  Vitals signs and nursing note reviewed  Constitutional:       General: He is not in acute distress  Appearance: Normal appearance  He is well-developed  He is not ill-appearing, toxic-appearing or diaphoretic  HENT:      Head: Normocephalic and atraumatic  Eyes:      General:         Right eye: No discharge  Left eye: No discharge  Conjunctiva/sclera: Conjunctivae normal       Pupils: Pupils are equal, round, and reactive to light  Neck:      Musculoskeletal: Normal range of motion and neck supple  No neck rigidity or muscular tenderness  Cardiovascular:      Rate and Rhythm: Normal rate and regular rhythm  Pulses: Normal pulses  Heart sounds: Normal heart sounds  No murmur  Pulmonary:      Effort: Pulmonary effort is normal  No respiratory distress  Breath sounds: Normal breath sounds  No wheezing, rhonchi or rales  Chest:      Chest wall: No tenderness  Abdominal:      General: Abdomen is flat   Bowel sounds are normal  There is no distension  Palpations: Abdomen is soft  There is no mass  Tenderness: There is no abdominal tenderness  Hernia: No hernia is present  Musculoskeletal:      Comments: Right ANNALEE- no wounds, erythema, tenderness of the stump    Lymphadenopathy:      Cervical: No cervical adenopathy  Skin:     General: Skin is warm and dry  Capillary Refill: Capillary refill takes less than 2 seconds  Coloration: Skin is not jaundiced or pale  Findings: No lesion  Neurological:      General: No focal deficit present  Mental Status: He is alert and oriented to person, place, and time  Mental status is at baseline  Cranial Nerves: No cranial nerve deficit  Sensory: No sensory deficit  Motor: No weakness  Gait: Gait normal    Psychiatric:         Mood and Affect: Mood normal          Behavior: Behavior normal          Thought Content:  Thought content normal          Judgment: Judgment normal           Hong Flores, [address]

## 2021-06-01 NOTE — PROGRESS NOTES
Assessment/Plan:      Diagnoses and all orders for this visit:    Encounter for immunization    Annual physical exam    Type 2 diabetes mellitus with diabetic polyneuropathy, with long-term current use of insulin ([address] )  -     sitaGLIPtin-metFORMIN (JANUMET)  MG per tablet; Take 1 tablet by mouth 2 (two) times a day with meals  -     HEMOGLOBIN A1C W/ EAG ESTIMATION; Future  -     Comprehensive metabolic panel; Future  -     insulin glargine (LANTUS) 100 units/mL subcutaneous injection; Inject 17 Units under the skin daily at bedtime    Acute left ankle pain  -     XR ankle 3+ vw left; Future    Aortic thrombus (HCC)  -     Discontinue: insulin glargine (LANTUS) 100 units/mL subcutaneous injection; Inject 17 Units under the skin daily at bedtime    Anemia, unspecified type  -     CBC and differential; Future  -     Iron Panel (Includes Ferritin, Iron Sat%, Iron, and TIBC); Future  -     Vitamin B12; Future    Type 2 diabetes mellitus without complication, without long-term current use of insulin (HCC)  Comments:  Pt will return to care in 1 week   Orders:  -     Insulin Pen Needle 31G X 5 MM MISC; Use daily Pt to inject 4 X daily    Other orders  -     Cancel: Zoster Vaccine Recombinant IM  -     Discontinue: pregabalin (LYRICA) 50 mg capsule          Subjective:     Patient ID: Wanda Phelan is a 59 y o  male  Patient states that he had a fall three weeks ago when working  With PT  When he fell, his left leg got caught underneath him  Patient then has had ankle pain since with swelling, ecchymosis and tenderness on the medial aspect  Will order x-rays  Did have doppler which was negative  Pain management reviewed with patient and nephew  Patient had fitting from prosthetic and was supposed to be set up in acute rehab for ten days to help with ambulation with prosthetic  Awaiting call from facility  HGA1C- 7 7 much improve from previous results   Will increase insulin to 17 units and continue to monitor blood sugars  Diabetes  He presents for his follow-up diabetic visit  He has type 2 diabetes mellitus  No MedicAlert identification noted  His disease course has been improving  There are no hypoglycemic associated symptoms  Pertinent negatives for hypoglycemia include no confusion, dizziness, headaches, hunger, mood changes, nervousness/anxiousness, pallor, seizures, sleepiness, speech difficulty, sweats or tremors  Associated symptoms include visual change  Pertinent negatives for diabetes include no blurred vision, no chest pain, no fatigue, no foot paresthesias, no foot ulcerations, no polydipsia, no polyphagia, no polyuria, no weakness and no weight loss  (Right ANNALEE ) There are no hypoglycemic complications  Pertinent negatives for hypoglycemia complications include no blackouts, no hospitalization, no nocturnal hypoglycemia, no required assistance and no required glucagon injection  Symptoms are improving  Diabetic complications include nephropathy, peripheral neuropathy and retinopathy  Risk factors for coronary artery disease include dyslipidemia, diabetes mellitus, hypertension, sedentary lifestyle, stress and male sex  Current diabetic treatment includes insulin injections and oral agent (dual therapy)  He is compliant with treatment most of the time  He is currently taking insulin pre-lunch  Insulin injections are given by relative  His weight is stable  He is following a diabetic diet  He participates in exercise three times a week (lower and upper extremiities )  He monitors blood glucose at home 1-2 x per day  His home blood glucose trend is decreasing steadily  An ACE inhibitor/angiotensin II receptor blocker is being taken  He does not see a podiatrist Eye exam is not current  Review of Systems   Constitutional: Negative for activity change, chills, fatigue, fever and weight loss  HENT: Negative for ear pain and sore throat      Eyes: Negative for blurred vision, pain and visual disturbance  Respiratory: Negative for cough, chest tightness and shortness of breath  Cardiovascular: Negative for chest pain, palpitations and leg swelling  Gastrointestinal: Negative for abdominal pain, anal bleeding, blood in stool, constipation, diarrhea, nausea and vomiting  Endocrine: Negative for polydipsia, polyphagia and polyuria  Genitourinary: Negative for dysuria and hematuria  Musculoskeletal: Positive for myalgias (right stump )  Negative for arthralgias and back pain  Skin: Negative for color change, pallor and rash  Neurological: Negative for dizziness, tremors, seizures, syncope, speech difficulty, weakness and headaches  Psychiatric/Behavioral: Negative for confusion  The patient is not nervous/anxious  All other systems reviewed and are negative  Objective:     Physical Exam  Vitals signs and nursing note reviewed  Constitutional:       General: He is not in acute distress  Appearance: Normal appearance  He is not ill-appearing or toxic-appearing  HENT:      Head: Normocephalic and atraumatic  Eyes:      Extraocular Movements: Extraocular movements intact  Conjunctiva/sclera: Conjunctivae normal       Pupils: Pupils are equal, round, and reactive to light  Neck:      Musculoskeletal: Normal range of motion and neck supple  No neck rigidity or muscular tenderness  Cardiovascular:      Rate and Rhythm: Normal rate and regular rhythm  Pulses: Normal pulses  Heart sounds: Normal heart sounds  No murmur  No friction rub  No gallop  Pulmonary:      Effort: Pulmonary effort is normal  No respiratory distress  Breath sounds: Normal breath sounds  No stridor  No wheezing  Abdominal:      General: Abdomen is flat  Bowel sounds are normal       Palpations: Abdomen is soft  Musculoskeletal: Normal range of motion  General: No swelling or deformity  Right lower leg: No edema          Feet:       Comments: Right AKA    Skin: General: Skin is warm and dry  Capillary Refill: Capillary refill takes less than 2 seconds  Coloration: Skin is not jaundiced or pale  Findings: No bruising  Neurological:      General: No focal deficit present  Mental Status: He is alert and oriented to person, place, and time  Mental status is at baseline  Psychiatric:         Mood and Affect: Mood normal          Behavior: Behavior normal          Thought Content:  Thought content normal          Judgment: Judgment normal

## 2021-06-01 NOTE — PATIENT INSTRUCTIONS
Keep a log of your blood pressures at home and if it is staying over 140/90 please call and let me know  Increase insulin by 2 units to get better control of your sugars  If you have any episodes of low blood sugar please let me know  Have your labs completed before your next visit  Have your x-ray of your ankle and I will follow up with you on the results  Wellness Visit for Adults   AMBULATORY CARE:   A wellness visit  is when you see your healthcare provider to get screened for health problems  Your healthcare provider will also give you advice on how to stay healthy  Write down your questions so you remember to ask them  Ask your healthcare provider how often you should have a wellness visit  What happens at a wellness visit:  Your healthcare provider will ask about your health, and your family history of health problems  This includes high blood pressure, heart disease, and cancer  He or she will ask if you have symptoms that concern you, if you smoke, and about your mood  You may also be asked about your intake of medicines, supplements, food, and alcohol  Any of the following may be done:  · Your weight  will be checked  Your height may also be checked so your body mass index (BMI) can be calculated  Your BMI shows if you are at a healthy weight  · Your blood pressure  and heart rate will be checked  Your temperature may also be checked  · Blood and urine tests  may be done  Blood tests may be done to check your cholesterol levels  Abnormal cholesterol levels increase your risk for heart disease and stroke  You may also need a blood or urine test to check for diabetes if you are at increased risk  Urine tests may be done to look for signs of an infection or kidney disease  · A physical exam  includes checking your heartbeat and lungs with a stethoscope  Your healthcare provider may also check your skin to look for sun damage  · Screening tests  may be recommended   A screening test is done to check for diseases that may not cause symptoms  The screening tests you may need depend on your age, gender, family history, and lifestyle habits  For example, colorectal screening may be recommended if you are 48years old or older  Screening tests you need if you are a woman:   · A Pap smear  is used to screen for cervical cancer  Pap smears are usually done every 3 to 5 years depending on your age  You may need them more often if you have had abnormal Pap smear test results in the past  Ask your healthcare provider how often you should have a Pap smear  · A mammogram  is an x-ray of your breasts to screen for breast cancer  Experts recommend mammograms every 2 years starting at age 48 years  You may need a mammogram at age 52 years or younger if you have an increased risk for breast cancer  Talk to your healthcare provider about when you should start having mammograms and how often you need them  Vaccines you may need:   · Get an influenza vaccine  every year  The influenza vaccine protects you from the flu  Several types of viruses cause the flu  The viruses change over time, so new vaccines are made each year  · Get a tetanus-diphtheria (Td) booster vaccine  every 10 years  This vaccine protects you against tetanus and diphtheria  Tetanus is a severe infection that may cause painful muscle spasms and lockjaw  Diphtheria is a severe bacterial infection that causes a thick covering in the back of your mouth and throat  · Get a human papillomavirus (HPV) vaccine  if you are female and aged 23 to 32 or male 23 to 24 and never received it  This vaccine protects you from HPV infection  HPV is the most common infection spread by sexual contact  HPV may also cause vaginal, penile, and anal cancers  · Get a pneumococcal vaccine  if you are aged 72 years or older  The pneumococcal vaccine is an injection given to protect you from pneumococcal disease   Pneumococcal disease is an infection caused by pneumococcal bacteria  The infection may cause pneumonia, meningitis, or an ear infection  · Get a shingles vaccine  if you are 60 or older, even if you have had shingles before  The shingles vaccine is an injection to protect you from the varicella-zoster virus  This is the same virus that causes chickenpox  Shingles is a painful rash that develops in people who had chickenpox or have been exposed to the virus  How to eat healthy:  My Plate is a model for planning healthy meals  It shows the types and amounts of foods that should go on your plate  Fruits and vegetables make up about half of your plate, and grains and protein make up the other half  A serving of dairy is included on the side of your plate  The amount of calories and serving sizes you need depends on your age, gender, weight, and height  Examples of healthy foods are listed below:  · Eat a variety of vegetables  such as dark green, red, and orange vegetables  You can also include canned vegetables low in sodium (salt) and frozen vegetables without added butter or sauces  · Eat a variety of fresh fruits , canned fruit in 100% juice, frozen fruit, and dried fruit  · Include whole grains  At least half of the grains you eat should be whole grains  Examples include whole-wheat bread, wheat pasta, brown rice, and whole-grain cereals such as oatmeal     · Eat a variety of protein foods such as seafood (fish and shellfish), lean meat, and poultry without skin (turkey and chicken)  Examples of lean meats include pork leg, shoulder, or tenderloin, and beef round, sirloin, tenderloin, and extra lean ground beef  Other protein foods include eggs and egg substitutes, beans, peas, soy products, nuts, and seeds  · Choose low-fat dairy products such as skim or 1% milk or low-fat yogurt, cheese, and cottage cheese  · Limit unhealthy fats  such as butter, hard margarine, and shortening       Exercise:  Exercise at least 30 minutes per day on most days of the week  Some examples of exercise include walking, biking, dancing, and swimming  You can also fit in more physical activity by taking the stairs instead of the elevator or parking farther away from stores  Include muscle strengthening activities 2 days each week  Regular exercise provides many health benefits  It helps you manage your weight, and decreases your risk for type 2 diabetes, heart disease, stroke, and high blood pressure  Exercise can also help improve your mood  Ask your healthcare provider about the best exercise plan for you  General health and safety guidelines:   · Do not smoke  Nicotine and other chemicals in cigarettes and cigars can cause lung damage  Ask your healthcare provider for information if you currently smoke and need help to quit  E-cigarettes or smokeless tobacco still contain nicotine  Talk to your healthcare provider before you use these products  · Limit alcohol  A drink of alcohol is 12 ounces of beer, 5 ounces of wine, or 1½ ounces of liquor  · Lose weight, if needed  Being overweight increases your risk of certain health conditions  These include heart disease, high blood pressure, type 2 diabetes, and certain types of cancer  · Protect your skin  Do not sunbathe or use tanning beds  Use sunscreen with a SPF 15 or higher  Apply sunscreen at least 15 minutes before you go outside  Reapply sunscreen every 2 hours  Wear protective clothing, hats, and sunglasses when you are outside  · Drive safely  Always wear your seatbelt  Make sure everyone in your car wears a seatbelt  A seatbelt can save your life if you are in an accident  Do not use your cell phone when you are driving  This could distract you and cause an accident  Pull over if you need to make a call or send a text message  · Practice safe sex  Use latex condoms if are sexually active and have more than one partner   Your healthcare provider may recommend screening tests for sexually transmitted infections (STIs)  · Wear helmets, lifejackets, and protective gear  Always wear a helmet when you ride a bike or motorcycle, go skiing, or play sports that could cause a head injury  Wear protective equipment when you play sports  Wear a lifejacket when you are on a boat or doing water sports  © Copyright [address] Information is for End User's use only and may not be sold, redistributed or otherwise used for commercial purposes  All illustrations and images included in CareNotes® are the copyrighted property of [employer]  or [address] [place], [place] [address]jaswinder   The above information is an  only  It is not intended as medical advice for individual conditions or treatments  Talk to your doctor, nurse or pharmacist before following any medical regimen to see if it is safe and effective for you

## 2021-06-03 DIAGNOSIS — E11.42 TYPE 2 DIABETES MELLITUS WITH DIABETIC POLYNEUROPATHY, WITH LONG-TERM CURRENT USE OF INSULIN (HCC): ICD-10-CM

## 2021-06-03 DIAGNOSIS — Z79.4 TYPE 2 DIABETES MELLITUS WITH DIABETIC POLYNEUROPATHY, WITH LONG-TERM CURRENT USE OF INSULIN (HCC): ICD-10-CM

## 2021-06-03 RX ORDER — INSULIN GLARGINE 100 [IU]/ML
17 INJECTION, SOLUTION SUBCUTANEOUS
Qty: 10 ML | Refills: 2 | Status: ON HOLD | OUTPATIENT
Start: 2021-06-03 | End: 2021-06-24 | Stop reason: SDUPTHER

## 2021-06-10 ENCOUNTER — HOSPITAL ENCOUNTER (INPATIENT)
Facility: HOSPITAL | Age: 61
LOS: 15 days | Discharge: HOME HEALTH | DRG: 862 | End: 2021-06-25
Attending: FAMILY MEDICINE | Admitting: FAMILY MEDICINE
Payer: COMMERCIAL

## 2021-06-10 DIAGNOSIS — I15.9 SECONDARY HYPERTENSION: Primary | ICD-10-CM

## 2021-06-10 DIAGNOSIS — Z79.4 TYPE 2 DIABETES MELLITUS WITH DIABETIC POLYNEUROPATHY, WITH LONG-TERM CURRENT USE OF INSULIN (HCC): ICD-10-CM

## 2021-06-10 DIAGNOSIS — K21.9 GASTROESOPHAGEAL REFLUX DISEASE, UNSPECIFIED WHETHER ESOPHAGITIS PRESENT: ICD-10-CM

## 2021-06-10 DIAGNOSIS — I74.10 AORTIC THROMBUS (HCC): ICD-10-CM

## 2021-06-10 DIAGNOSIS — R94.31 PROLONGED Q-T INTERVAL ON ECG: ICD-10-CM

## 2021-06-10 DIAGNOSIS — S78.111A ABOVE-KNEE AMPUTATION OF RIGHT LOWER EXTREMITY (HCC): ICD-10-CM

## 2021-06-10 DIAGNOSIS — E11.42 TYPE 2 DIABETES MELLITUS WITH DIABETIC POLYNEUROPATHY, UNSPECIFIED WHETHER LONG TERM INSULIN USE (HCC): ICD-10-CM

## 2021-06-10 DIAGNOSIS — R93.7 ABNORMAL BONE XRAY: ICD-10-CM

## 2021-06-10 DIAGNOSIS — E11.42 TYPE 2 DIABETES MELLITUS WITH DIABETIC POLYNEUROPATHY, WITH LONG-TERM CURRENT USE OF INSULIN (HCC): ICD-10-CM

## 2021-06-10 DIAGNOSIS — Z89.611 S/P AKA (ABOVE KNEE AMPUTATION), RIGHT (HCC): ICD-10-CM

## 2021-06-10 PROBLEM — K59.03 DRUG-INDUCED CONSTIPATION: Status: ACTIVE | Noted: 2021-03-08

## 2021-06-10 PROBLEM — E11.9 DIABETES MELLITUS (HCC): Status: ACTIVE | Noted: 2021-02-25

## 2021-06-10 PROBLEM — E44.0 MODERATE PROTEIN-CALORIE MALNUTRITION (HCC): Status: ACTIVE | Noted: 2021-03-12

## 2021-06-10 LAB — SARS-COV-2 RNA RESP QL NAA+PROBE: NEGATIVE

## 2021-06-10 PROCEDURE — 4010F ACE/ARB THERAPY RXD/TAKEN: CPT | Performed by: NURSE PRACTITIONER

## 2021-06-10 PROCEDURE — 97166 OT EVAL MOD COMPLEX 45 MIN: CPT

## 2021-06-10 PROCEDURE — U0005 INFEC AGEN DETEC AMPLI PROBE: HCPCS | Performed by: PHYSICAL MEDICINE & REHABILITATION

## 2021-06-10 PROCEDURE — 97530 THERAPEUTIC ACTIVITIES: CPT

## 2021-06-10 PROCEDURE — U0003 INFECTIOUS AGENT DETECTION BY NUCLEIC ACID (DNA OR RNA); SEVERE ACUTE RESPIRATORY SYNDROME CORONAVIRUS 2 (SARS-COV-2) (CORONAVIRUS DISEASE [COVID-19]), AMPLIFIED PROBE TECHNIQUE, MAKING USE OF HIGH THROUGHPUT TECHNOLOGIES AS DESCRIBED BY CMS-2020-01-R: HCPCS | Performed by: PHYSICAL MEDICINE & REHABILITATION

## 2021-06-10 PROCEDURE — 99222 1ST HOSP IP/OBS MODERATE 55: CPT | Performed by: FAMILY MEDICINE

## 2021-06-10 RX ORDER — LORATADINE 10 MG/1
10 TABLET ORAL
Status: DISCONTINUED | OUTPATIENT
Start: 2021-06-10 | End: 2021-06-25 | Stop reason: HOSPADM

## 2021-06-10 RX ORDER — METHADONE HYDROCHLORIDE 10 MG/1
70 TABLET ORAL DAILY
Status: DISCONTINUED | OUTPATIENT
Start: 2021-06-11 | End: 2021-06-25 | Stop reason: HOSPADM

## 2021-06-10 RX ORDER — INSULIN GLARGINE 100 [IU]/ML
17 INJECTION, SOLUTION SUBCUTANEOUS
Status: DISCONTINUED | OUTPATIENT
Start: 2021-06-10 | End: 2021-06-25 | Stop reason: HOSPADM

## 2021-06-10 RX ORDER — MELATONIN
2000 DAILY
Status: DISCONTINUED | OUTPATIENT
Start: 2021-06-10 | End: 2021-06-25 | Stop reason: HOSPADM

## 2021-06-10 RX ORDER — ACETAMINOPHEN 325 MG/1
975 TABLET ORAL EVERY 8 HOURS SCHEDULED
Status: DISCONTINUED | OUTPATIENT
Start: 2021-06-10 | End: 2021-06-10

## 2021-06-10 RX ORDER — ALBUTEROL SULFATE 90 UG/1
2 AEROSOL, METERED RESPIRATORY (INHALATION) EVERY 4 HOURS PRN
Status: DISCONTINUED | OUTPATIENT
Start: 2021-06-10 | End: 2021-06-25 | Stop reason: HOSPADM

## 2021-06-10 RX ORDER — ISOSORBIDE MONONITRATE 30 MG/1
120 TABLET, EXTENDED RELEASE ORAL DAILY
Status: DISCONTINUED | OUTPATIENT
Start: 2021-06-10 | End: 2021-06-10

## 2021-06-10 RX ORDER — DOCUSATE SODIUM 100 MG/1
100 CAPSULE, LIQUID FILLED ORAL 2 TIMES DAILY
Status: DISCONTINUED | OUTPATIENT
Start: 2021-06-10 | End: 2021-06-25 | Stop reason: HOSPADM

## 2021-06-10 RX ORDER — SENNOSIDES 8.6 MG
2 TABLET ORAL DAILY
Status: DISCONTINUED | OUTPATIENT
Start: 2021-06-10 | End: 2021-06-10

## 2021-06-10 RX ORDER — DULOXETIN HYDROCHLORIDE 30 MG/1
30 CAPSULE, DELAYED RELEASE ORAL DAILY
Status: DISCONTINUED | OUTPATIENT
Start: 2021-06-10 | End: 2021-06-10

## 2021-06-10 RX ORDER — MUSCLE RUB CREAM 100; 150 MG/G; MG/G
CREAM TOPICAL 4 TIMES DAILY PRN
Status: DISCONTINUED | OUTPATIENT
Start: 2021-06-10 | End: 2021-06-10

## 2021-06-10 RX ORDER — DULOXETIN HYDROCHLORIDE 30 MG/1
30 CAPSULE, DELAYED RELEASE ORAL
Status: DISCONTINUED | OUTPATIENT
Start: 2021-06-10 | End: 2021-06-25 | Stop reason: HOSPADM

## 2021-06-10 RX ORDER — LORATADINE 10 MG/1
10 TABLET ORAL DAILY
Status: DISCONTINUED | OUTPATIENT
Start: 2021-06-10 | End: 2021-06-10

## 2021-06-10 RX ORDER — FLUTICASONE PROPIONATE 50 MCG
1 SPRAY, SUSPENSION (ML) NASAL DAILY
Status: DISCONTINUED | OUTPATIENT
Start: 2021-06-10 | End: 2021-06-25 | Stop reason: HOSPADM

## 2021-06-10 RX ORDER — ACETAMINOPHEN 325 MG/1
650 TABLET ORAL EVERY 8 HOURS SCHEDULED
Status: DISCONTINUED | OUTPATIENT
Start: 2021-06-10 | End: 2021-06-11

## 2021-06-10 RX ORDER — LISINOPRIL 10 MG/1
10 TABLET ORAL DAILY
Status: DISCONTINUED | OUTPATIENT
Start: 2021-06-10 | End: 2021-06-25 | Stop reason: HOSPADM

## 2021-06-10 RX ORDER — PANTOPRAZOLE SODIUM 20 MG/1
20 TABLET, DELAYED RELEASE ORAL DAILY
Status: DISCONTINUED | OUTPATIENT
Start: 2021-06-10 | End: 2021-06-25 | Stop reason: HOSPADM

## 2021-06-10 RX ORDER — PREGABALIN 75 MG/1
75 CAPSULE ORAL 3 TIMES DAILY
Status: DISCONTINUED | OUTPATIENT
Start: 2021-06-10 | End: 2021-06-25 | Stop reason: HOSPADM

## 2021-06-10 RX ORDER — BISACODYL 10 MG
10 SUPPOSITORY, RECTAL RECTAL DAILY PRN
Status: DISCONTINUED | OUTPATIENT
Start: 2021-06-10 | End: 2021-06-25 | Stop reason: HOSPADM

## 2021-06-10 RX ADMIN — PREGABALIN 75 MG: 75 CAPSULE ORAL at 21:47

## 2021-06-10 RX ADMIN — PREGABALIN 75 MG: 75 CAPSULE ORAL at 17:15

## 2021-06-10 RX ADMIN — MAGNESIUM OXIDE TAB 400 MG (241.3 MG ELEMENTAL MG) 400 MG: 400 (241.3 MG) TAB at 17:14

## 2021-06-10 RX ADMIN — DULOXETINE HYDROCHLORIDE 30 MG: 30 CAPSULE, DELAYED RELEASE ORAL at 21:47

## 2021-06-10 RX ADMIN — APIXABAN 5 MG: 5 TABLET, FILM COATED ORAL at 17:14

## 2021-06-10 RX ADMIN — INSULIN GLARGINE 17 UNITS: 100 INJECTION, SOLUTION SUBCUTANEOUS at 21:47

## 2021-06-10 RX ADMIN — ACETAMINOPHEN 650 MG: 325 TABLET ORAL at 21:47

## 2021-06-10 RX ADMIN — METFORMIN HYDROCHLORIDE: 500 TABLET, FILM COATED ORAL at 17:15

## 2021-06-10 RX ADMIN — DOCUSATE SODIUM 100 MG: 100 CAPSULE, LIQUID FILLED ORAL at 17:15

## 2021-06-10 RX ADMIN — ACETAMINOPHEN 650 MG: 325 TABLET ORAL at 14:45

## 2021-06-10 RX ADMIN — LORATADINE 10 MG: 10 TABLET ORAL at 21:47

## 2021-06-10 NOTE — H&P
[facility]  Progress Note [employer] 1960, 59 y o  male MRN: [id number]  Unit/Bed#: -01 Encounter: [id number]  Primary Care Provider: VERONICA Salmon   Date and time admitted to hospital: 6/10/2021 11:44 AM  Rehab Diagnosis: Impairment of mobility, safety and Activities of Daily Living (ADLs) due to Amputation:  05 3  Unilateral Lower Limb Above the Knee     History of Present Illness:   Jerri Casper is a 59 y o  male who presented to the [address] [place] [address] for acute rehabilitation services on 6/10/21  Prior to this admission the patient required an emergent right AKA secondary to ischemia of RLE  A suspected embolism and aortic thrombus  Surgery was performed on 1/14/21 bu Dr Dell Zuleta  He discharged to acute rehab on 2/25/21  From [place] rehab he readmitted back to [facility] on 2/25/21 with increased SOB and hypoxia  He was discharged back to [place] acute rehab on 3/3/21  From [place] he discharged home  Pt [facility] his prosthesis [facility]/31 [facility]  Patient accepted at [address] [place] [address] for prosthesis training secondary to right AKA   He has a PMH significant for right femoral DVT, anemia, aortic thrombus, Type 2 diabetes, Left hydropneumax, GERD, HTN, hypercholesterolemia, long-term methadone use due to chronic low back pain and cervical radiculopathy       Subjective: Patient is doing well and has no current complaints or questions      Review of Systems:   Consitutional: Negative  HENT: Positive for a "blur" in eye  CV: Negative  Resp: Negative  GI: Negative  Urinary: Negative  Musculoskeletal: Positive for left knee pain, positive for left ankle pain and swelling  Neuro: Negative  Psych: Negative  Endocrine: Negative  Hemat: Negative      Plan:      Chronic bilateral low back pain  Assessment & Plan  Continue methadone     Cervical radiculopathy  Assessment & Plan  Continue methadone      S/P AKA (above knee amputation), right [facility])  Assessment & Plan  Acute chomprehensive interdisciplinary inpatient rehabilitation to include intensive skilled therapies as outlines with oversight and management by a rehabilitation Physician Assistant overseen by rehabilitation physician as well as inpatient rehabilitation nursing, case management and weekly interdisciplinary team meeting        Type 2 diabetes mellitus, with long-term current use of insulin ([facility] [address] )  Assessment & Plan  Lab Results   Component Value Date     HGBA1C 7 7 (H) 05/29/2021         No results for input(s): POCGLU in the last 72 hours      Blood Sugar Average: Last 72 hrs:  Continue lantus 17 units HS, Janumet 50/1000 BID  Monitor accu-cheks  Hypoglycemia protocol in place     Vitamin D deficiency  Assessment & Plan  Continue cholecalciferol 1,000 units     Methadone maintenance therapy patient [facility])  Assessment & Plan  Patient with chronic low back pain and cervical radiculopathy  Has naloxone in medication regimen      Hyperlipidemia associated with type 2 diabetes mellitus (HCC)  Assessment & Plan     Continue empagliflozain 25 mg oral for hyperlipidemia     HTN (hypertension)  Assessment & Plan  Continue lisinopril 10 mg daily  Monitor vitals daily        Confirmed methadone dosage with nephew and patient   Patient last took his dosage this morning          Scheduled Meds:           Current Facility-Administered Medications   Medication Dose Route Frequency Provider Last Rate    acetaminophen  975 mg Oral Q8H [address] Refjune Quintanilla PA-C      albuterol  2 puff Inhalation Q4H PRN Priscilla Quintanilla PA-C      apixaban  5 mg Oral BID Priscilla Quintanilla PA-C      bisacodyl  10 mg Rectal Daily PRN Priscilla Quintanilla PA-C      cholecalciferol  2,000 Units Oral Daily Priscilla Quintanilla PA-C      Diclofenac Sodium  4 g Topical 4x Daily Priscilla Quintanilla PA-C      DULoxetine  30 mg Oral Daily Priscilla Quintanilla PA-C      Empagliflozin  25 mg Oral QAM Priscilla Quintanilla PA-C      fluticasone  1 spray Nasal Daily Manfred Totz, PA-C      insulin glargine  17 Units Subcutaneous HS Manfred Totz, PA-C      isosorbide mononitrate  120 mg Oral Daily Manfred Totz, PA-C      lisinopril  10 mg Oral Daily Manfred Totz, PA-C      loratadine  10 mg Oral Daily Manfred Totz, PA-C      menthol-methyl salicylate   Apply externally 4x Daily PRN Manfred Totz, PA-C      methadone  70 mg Oral Daily Manfred Totz, PA-C      multivitamin-minerals  1 tablet Oral Daily Manfred Totz, PA-C      pantoprazole  20 mg Oral Daily Manfred Totz, PA-C      pregabalin  75 mg Oral TID Manfred Totz, PA-C      senna  2 tablet Oral Daily Manfred Totz, PA-C      sitaGLIPtin-metFORMIN (JANUMET 50/1000) combo dose   Oral BID Manfred Totz, PA-C           Restrictions include:  right lower extremity with new prosthesis Fall precautions      Functional History - Prior to Admission:      Functional Status: Needed prior assistance with self care, indoor mobility  His cognition he is indepdent  Patient lives in a multilevel home     Functional Status Upon Admission to [facility]:  Mobility: Min assist  Transfers: Min assist  ADLs: Dependent for LB ADLs independent for UB ADLs     Physical Exam:  Temp:  [97 9 °F (36 6 °C)] 97 9 °F (36 6 °C)  HR:  [99] 99  Resp:  [18] 18  BP: (112)/(63) 112/63  SpO2:  [96 %] 96 %     General:   alert, no apparent distress, cooperative and comfortable  HEENT:  Head: Normocephalic, no lesions, without obvious abnormality    Eye: Normal external eye, conjunctiva, lidsc cornea  Ears: Normal external ears  Nose: Normal external nose, mucus membranes  CARDIAC:  regular rate and rhythm, S1, S2 normal, no murmur, click, rub or gallop  LUNGS:  no abnormal respiratory pattern, no retractions noted, non-labored breathing   ABDOMEN:  soft, non-tender, non-distended  EXTREMITIES:  extremities normal, warm and well-perfused; no cyanosis, clubbing, or edema  NEURO:  clear speech, following all commands, oriented x4  PSYCH:  Alert and oriented, appropriate affect         Laboratory:           Invalid input(s): PLTCT         Invalid input(s): CA              Wt Readings from Last 1 Encounters:   02/28/21 66 kg (145 lb 8 1 oz)      Estimated body mass index is 22 12 kg/m² as calculated from the following:    Height as of 6/1/21: 5' 8" (1 727 m)  Weight as of 2/28/21: 66 kg (145 lb 8 1 oz)     Imaging: reviewed     Rehabilitation Prognosis: good     Tolerance for three hours of therapy a day: good      Family/Patient Goals:  Patient/family's goals: Return to previous home/apartment      Patient will receive PT and OT 90 minutes each per day, five days per week to achieve rehab goals or participate in 900 minutes of therapy within a 7 day week period      Mobility Goals: [place]  Transfer Goals: [place]  Activities of Daily Living (ADLs) Goals: [place]     Discharge Planning:  Rehabilitation and discharge goals discussed with the patient and/or family      Case Managment and Social Work to review patient/family resources and to coordinate Discharge Planning      Estimated length of stay: 10 to 14 days     Patient and Family Education and Training:  Rehabilitation and discharge goals discussed with the patient and/or family  Patient/family education/training needs to be discussed in weekly team meeting      Equipment/DME needs: Therapy teams to assess and evaluate for additional equipment/DME needs throughout rehabilitation stay     Medical History        Past Medical History:   Diagnosis Date    Diabetes mellitus ([facility] [address] )      GERD (gastroesophageal reflux disease)      Hypercholesteremia      Hypertension      Methadone use [facility])          Surgical History         Past Surgical History:   Procedure Laterality Date    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     Procedure: AMPUTATION ABOVE KNEE (AKA);   Surgeon: Adrianna Santiago MD;  Location:  MAIN OR;  Service: Vascular    AMPUTATION ABOVE KNEE (AKA) Right 1/18/2021     Procedure: AMPUTATION ABOVE KNEE (AKA) FORMALIZATION,  R AKA wound washout, wound closure;  Surgeon: Claus Delacruz MD;  Location[facility] MAIN [facility];  Service: Vascular    ARTERIOGRAM Right 1/13/2021     Procedure: ARTERIOGRAM;  Surgeon: Felisha Braun DO;  [facility];  Service: Vascular    IR CHEST TUBE PLACEMENT   2/10/2021    IR LOWER EXTREMITY ANGIOGRAM   1/14/2021    THROMBECTOMY W/ EMBOLECTOMY Right 1/13/2021     Procedure: EMBOLECTOMY/THROMBECTOMY LOWER EXTREMITY;  Surgeon: Felisha Braun DO;  [facility];  Service: Vascular               Family History   Problem Relation Age of Onset    Diabetes Mother      Hypertension Father      Leukemia Father      Acute lymphoblastic leukemia Father      Cancer Sister        Social History   Social History            Socioeconomic History    Marital status:        Spouse name: None    Number of children: None    Years of education: None    Highest education level: None   Occupational History    None   Social Needs    Financial resource strain: None    Food insecurity       Worry: None       Inability: None    Transportation needs       Medical: None       Non-medical: None   Tobacco Use    Smoking status: Former Smoker    Smokeless tobacco: Never Used   Substance and Sexual Activity    Alcohol use: Not Currently    Drug use:  No       Comment: methadone    Sexual activity: None   Lifestyle    Physical activity       Days per week: None       Minutes per session: None    Stress: None   Relationships    Social connections       Talks on phone: None       Gets together: None       Attends Jainism service: None       Active member of club or organization: None       Attends meetings of clubs or organizations: None       Relationship status: None    Intimate partner violence       Fear of current or ex partner: None       Emotionally abused: None       Physically abused: None       Forced sexual activity: None   Other Topics Concern    None   Social History Narrative    None              Patient Active Problem List   Diagnosis    Chronic hepatitis C without hepatic coma (HCC)    HTN (hypertension)    Hyperlipidemia associated with type 2 diabetes mellitus ([place] [address] )    Methadone maintenance therapy patient ([place] [address] )    Type 2 diabetes mellitus with diabetic polyneuropathy ([place] [address] )    Callous ulcer, limited to breakdown of skin ([place] [address] )    Bilateral lower extremity edema    BMI 28 0-28 9,adult    Positive depression screening    Mononeuropathy, unspecified    Other and unspecified noninfectious gastroenteritis and colitis    Mixed hyperlipidemia    Proteinuria    Tobacco abuse counseling    Vitamin D deficiency    SOB (shortness of breath) on exertion    Type 2 diabetes mellitus, with long-term current use of insulin (HCC)    Acute respiratory failure with hypoxia (HCC)    Sepsis due to COVID-19 ([place] [address] )    Pneumonia due to COVID-19 virus    Acute pancreatitis    Hematuria    Aortic thrombus (HCC)    Anemia    Muscle weakness of right upper extremity    Oropharyngeal dysphagia    Edema of right upper extremity    Ischemia of right lower extremity with suspected rhabdomyolysis    Right femoral vein DVT (HCC)    Left Hydropneumothorax    RUE weakness    Prolonged Q-T interval on ECG    Hypoxia    Empyema lung (HCC)    Stage II pressure ulcer of sacral region (HCC)    S/P AKA (above knee amputation), right (HCC)    Neck pain    Cervical radiculopathy    Chronic bilateral low back pain    Above-knee amputation of right lower extremity (HCC)    Drug-induced constipation    Moderate protein-calorie malnutrition (HCC)    Diabetes mellitus ([place] [address] )           Allergies   Allergen Reactions    Varenicline              Medical Necessity Criteria for [facility] Admission: Hypertension, Bowel/Bladder Management and Diabetes requiring close blood glucose monitoring  In addition, the preadmission screen, post-admission physical evaluation, overall plan of care and admissions order demonstrate a reasonable expectation that the following criteria were met at the time of admission to the [facility]  1  The patient requires active and ongoing therapeutic intervention of multiple therapy disciplines (physical therapy, occupational therapy, speech-language pathology, or prosthetics/orthotics), one of which is physical or occupational therapy  2  Patient requires an intensive rehabilitation therapy program, as defined in Chapter 1, section 110 2 2 of the CMS Medicare Policy Manual  This intensive rehabilitation therapy program will consist of at least 3 hours of therapy per day at least 5 days per week or at least 15 hours of intensive rehabilitation therapy within a 7 consecutive day period, beginning with the date of admission to the [facility]  3  The patient is reasonably expected to actively participate in, and benefit significantly from, the intensive rehabilitation therapy program as defined in Chapter 1, section 110 2 2 of the CMS Medicare Policy Manual at this time of admission to the [facility]  He can reasonably be expected to make measurable improvement (that will be of practical value to improve the patients functional capacity or adaptation to impairments) as a result of the rehabilitation treatment, as defined in section 110 3, and such improvement can be expected to be made within the prescribed period of time  As noted in the CMS Medicare Policy Manual, the patient need not be expected to achieve complete independence in the domain of self-care nor be expected to return to his or her prior level of functioning in order to meet this standard  4  The patient must require physician supervision by a rehabilitation physician   As such, a rehabilitation physician will conduct face-to-face visits with the patient at least 3 days per week throughout the patients stay in the AR[facility] to assess the patient both medically and functionally, as well as to modify the course of treatment as needed to maximize the patients capacity to benefit from the rehabilitation process  5  The patient requires an intensive and coordinated interdisciplinary approach to providing rehabilitation, as defined in Chapter 1, section 110 2 5 of the CMS Medicare Policy Manual  This will be achieved through periodic team conferences, conducted at least once in a 7-day period, and comprising of an interdisciplinary team of medical professionals consisting of: a rehabilitation physician, registered nurse,  and/or , and a licensed/certified therapist from each therapy discipline involved in treating the patient       Changes Since Pre-admission Assessment: None -This patient's participation in rehab continues to be reasonable, necessary and appropriate      CMS Required Post-Admission Physician Evaluation Elements  History and Physical, including medical history, functional history and active comorbidities as in above text      Post-Admission Physician Evaluation:  The patient has the potential to make improvement and is in need of physical, occupational, and/or therapy services  The patient may also need nutritional services  Given the patient's complex medical condition and risk of further medical complications, rehabilitative services cannot be safely provided at a lower level of care, such as a skilled nursing facility  I have reviewed the patient's functional and medical status at the time of the preadmission screening and they are the same as on the day of this admission  I acknowledge that I have personally performed a full physical examination on this patient within 24 hours of admission   The patient and/or family demonstrated understanding the rehabilitation program and the discharge process after we discussed them      Agree in entirety: yes  Minor adaptions: none    Major changes: none     Seamus Singh PA-C  Physical Medicine and Rehabilitation     ** Please Note: Fluency Direct voice to text software may have been used in the creation of this document   **                  Cosigned by: Donavon Cornelius MD at 6/10/2021  1:13 PM   Revision History

## 2021-06-10 NOTE — ASSESSMENT & PLAN NOTE
Lab Results   Component Value Date    HGBA1C 7 7 (H) 05/29/2021       No results for input(s): POCGLU in the last 72 hours      Blood Sugar Average: Last 72 hrs:  Continue lantus 17 units HS, Janumet 50/1000 BID  Monitor accu-cheks daily  Hypoglycemia protocol in place

## 2021-06-10 NOTE — ASSESSMENT & PLAN NOTE
Acute chomprehensive interdisciplinary inpatient rehabilitation to include intensive skilled therapies as outlines with oversight and management by a rehabilitation Physician Assistant overseen by rehabilitation physician as well as inpatient rehabilitation nursing, case management and weekly interdisciplinary team meeting  Ecchymosis noted to stump after prosthesis usage  Patient states he is having difficulty sleeping with the pain  Will continue tylenol and methadone  Encourage ice  Added lidocaine cream, pharmacy stated they had none  Will try volteran gel

## 2021-06-10 NOTE — NURSING NOTE
06/10/21 Resumed care of patient from previous nurse at this time  Patient resting comfortably in bed and offers no complaints at this time  Will continue to monitor   Danilo Gilman, RN

## 2021-06-10 NOTE — ASSESSMENT & PLAN NOTE
Patient with chronic low back pain and cervical radiculopathy  Continue naloxone as needed  Cont methadone

## 2021-06-10 NOTE — PLAN OF CARE
Problem: Potential for Falls  Goal: Patient will remain free of falls  Description: INTERVENTIONS:  - Assess patient frequently for physical needs  -  Identify cognitive and physical deficits and behaviors that affect risk of falls    -  [place] fall precautions as indicated by assessment   - Educate patient/family on patient safety including physical limitations  - Instruct patient to call for assistance with activity based on assessment  - Modify environment to reduce risk of injury  - Consider OT/PT consult to assist with strengthening/mobility  Outcome: Progressing     Problem: Prexisting or High Potential for Compromised Skin Integrity  Goal: Skin integrity is maintained or improved  Description: INTERVENTIONS:  - Identify patients at risk for skin breakdown  - Assess and monitor skin integrity  - Assess and monitor nutrition and hydration status  - Monitor labs   - Assess for incontinence   - Turn and reposition patient  - Assist with mobility/ambulation  - Relieve pressure over bony prominences  - Avoid friction and shearing  - Provide appropriate hygiene as needed including keeping skin clean and dry  - Evaluate need for skin moisturizer/barrier cream  - Collaborate with interdisciplinary team   - Patient/family teaching  - Consider wound care consult   Outcome: Progressing     Problem: PAIN - ADULT  Goal: Verbalizes/displays adequate comfort level or baseline comfort level  Description: Interventions:  - Encourage patient to monitor pain and request assistance  - Assess pain using appropriate pain scale  - Administer analgesics based on type and severity of pain and evaluate response  - Implement non-pharmacological measures as appropriate and evaluate response  - Consider cultural and social influences on pain and pain management  - Notify physician/advanced practitioner if interventions unsuccessful or patient reports new pain  Outcome: Progressing     Problem: SAFETY ADULT  Goal: Patient will remain free of falls  Description: INTERVENTIONS:  - Assess patient frequently for physical needs  -  Identify cognitive and physical deficits and behaviors that affect risk of falls    -  Nursery fall precautions as indicated by assessment   - Educate patient/family on patient safety including physical limitations  - Instruct patient to call for assistance with activity based on assessment  - Modify environment to reduce risk of injury  - Consider OT/PT consult to assist with strengthening/mobility  Outcome: Progressing  Goal: Maintain or return to baseline ADL function  Description: INTERVENTIONS:  -  Assess patient's ability to carry out ADLs; assess patient's baseline for ADL function and identify physical deficits which impact ability to perform ADLs (bathing, care of mouth/teeth, toileting, grooming, dressing, etc )  - Assess/evaluate cause of self-care deficits   - Assess range of motion  - Assess patient's mobility; develop plan if impaired  - Assess patient's need for assistive devices and provide as appropriate  - Encourage maximum independence but intervene and supervise when necessary  - Involve family in performance of ADLs  - Assess for home care needs following discharge   - Consider OT consult to assist with ADL evaluation and planning for discharge  - Provide patient education as appropriate  Outcome: Progressing  Goal: Maintain or return mobility status to optimal level  Description: INTERVENTIONS:  - Assess patient's baseline mobility status (ambulation, transfers, stairs, etc )    - Identify cognitive and physical deficits and behaviors that affect mobility  - Identify mobility aids required to assist with transfers and/or ambulation (gait belt, sit-to-stand, lift, walker, cane, etc )  - Nursery fall precautions as indicated by assessment  - Record patient progress and toleration of activity level on Mobility SBAR; progress patient to next Phase/Stage  - Instruct patient to call for assistance with activity based on assessment  - Consider rehabilitation consult to assist with strengthening/weightbearing, etc   Outcome: Progressing     Problem: SAFETY ADULT  Goal: Patient will remain free of falls  Description: INTERVENTIONS:  - Assess patient frequently for physical needs  -  Identify cognitive and physical deficits and behaviors that affect risk of falls    -  [place] fall precautions as indicated by assessment   - Educate patient/family on patient safety including physical limitations  - Instruct patient to call for assistance with activity based on assessment  - Modify environment to reduce risk of injury  - Consider OT/PT consult to assist with strengthening/mobility  Outcome: Progressing  Goal: Maintain or return to baseline ADL function  Description: INTERVENTIONS:  -  Assess patient's ability to carry out ADLs; assess patient's baseline for ADL function and identify physical deficits which impact ability to perform ADLs (bathing, care of mouth/teeth, toileting, grooming, dressing, etc )  - Assess/evaluate cause of self-care deficits   - Assess range of motion  - Assess patient's mobility; develop plan if impaired  - Assess patient's need for assistive devices and provide as appropriate  - Encourage maximum independence but intervene and supervise when necessary  - Involve family in performance of ADLs  - Assess for home care needs following discharge   - Consider OT consult to assist with ADL evaluation and planning for discharge  - Provide patient education as appropriate  Outcome: Progressing  Goal: Maintain or return mobility status to optimal level  Description: INTERVENTIONS:  - Assess patient's baseline mobility status (ambulation, transfers, stairs, etc )    - Identify cognitive and physical deficits and behaviors that affect mobility  - Identify mobility aids required to assist with transfers and/or ambulation (gait belt, sit-to-stand, lift, walker, cane, etc )  - [place] fall precautions as indicated by assessment  - Record patient progress and toleration of activity level on Mobility SBAR; progress patient to next Phase/Stage  - Instruct patient to call for assistance with activity based on assessment  - Consider rehabilitation consult to assist with strengthening/weightbearing, etc   Outcome: Progressing     Problem: DISCHARGE PLANNING  Goal: Discharge to home or other facility with appropriate resources  Description: INTERVENTIONS:  - Identify barriers to discharge w/patient and caregiver  - Arrange for needed discharge resources and transportation as appropriate  - Identify discharge learning needs (meds, wound care, etc )  - Arrange for interpretive services to assist at discharge as needed  - Refer to Case Management Department for coordinating discharge planning if the patient needs post-hospital services based on physician/advanced practitioner order or complex needs related to functional status, cognitive ability, or social support system  Outcome: Progressing     Problem: SKIN/TISSUE INTEGRITY - ADULT  Goal: Skin integrity remains intact  Description: INTERVENTIONS  - Identify patients at risk for skin breakdown  - Assess and monitor skin integrity  - Assess and monitor nutrition and hydration status  - Monitor labs (i e  albumin)  - Assess for incontinence   - Turn and reposition patient  - Assist with mobility/ambulation  - Relieve pressure over bony prominences  - Avoid friction and shearing  - Provide appropriate hygiene as needed including keeping skin clean and dry  - Evaluate need for skin moisturizer/barrier cream  - Collaborate with interdisciplinary team (i e  Nutrition, Rehabilitation, etc )   - Patient/family teaching  Outcome: Progressing  Goal: Incision(s), wounds(s) or drain site(s) healing without S/S of infection  Description: INTERVENTIONS  - Assess and document risk factors for skin impairment   - Assess and document dressing, incision, wound bed, drain sites and surrounding tissue  - Consider nutrition services referral as needed  - Oral mucous membranes remain intact  - Provide patient/ family education  Outcome: Progressing     Problem: MUSCULOSKELETAL - ADULT  Goal: Maintain or return mobility to safest level of function  Description: INTERVENTIONS:  - Assess patient's ability to carry out ADLs; assess patient's baseline for ADL function and identify physical deficits which impact ability to perform ADLs (bathing, care of mouth/teeth, toileting, grooming, dressing, etc )  - Assess/evaluate cause of self-care deficits   - Assess range of motion  - Assess patient's mobility  - Assess patient's need for assistive devices and provide as appropriate  - Encourage maximum independence but intervene and supervise when necessary  - Involve family in performance of ADLs  - Assess for home care needs following discharge   - Consider OT consult to assist with ADL evaluation and planning for discharge  - Provide patient education as appropriate  Outcome: Progressing  Goal: Maintain proper alignment of affected body part  Description: INTERVENTIONS:  - Support, maintain and protect limb and body alignment  - Provide patient/ family with appropriate education  Outcome: Progressing

## 2021-06-10 NOTE — PROGRESS NOTES
06/10/21 1355   Patient Data   Rehab Impairment Impairment of mobility, safety and Activities of Daily Living    Etiologic Diagnosis Unilateral Lower Limb Above the Knee   Support System   Name Hermann Busby   (nephew)   Relationship   (lives with sister, 2 nephews, and brother)   Multiple Support Systems   (sister Hugo Both 7 miles away, other nieces and nephews close)   Home Setup   Type of Home Multi Level   Method of Entry Ramp  (nephew pushes him in w/c)   Number of Stairs 5   Number of Stairs in [address] None  (uses commode and washes in basin)   Second Floor Bathroom Shower   Second Floor Bathroom Accessibility Built in shower seat;Grab bars in [address] [address] Available Yes  (sleeps in hospital bed)   Available Equipment Drop Arm Commode;Standard Walker; Wheelchair;Single Point [employer]  (sister uses walker; multiple canes; slide board)   [address]   (short term disability now; )   Transportation Family/friends drive   Prior Device(s) Used Wheelchair   Prior IADL Participation   Money Management   (nephew)   Meal Preparation   (nephew cooks)   Laundry   (brother takes laundry to [employer] )   Home Cleaning   (brother cleans house)   Prior Level of Function   Self-Care 2  Needed Some Help - Patient needed a partial assistance from another person to complete activities  (sponge bath; nephew helps for LB dressing)   Indoor-Mobility (Ambulation) 2  Needed Some Help - Patient needed a partial assistance from another person to complete activities  Stairs 9  Not Applicable   Functional Cognition 3  Independent - Patient completed the activities by him/herself, with or without an assistive device, with no assistance from a helper  Prior Assistance Needed for Meal Preparation;Household Chores/Cleaning;Money Management; Shopping;Medication Management;Driving  (nephew helps with the majority of things)   Prior Device Used A   Manual wheelchair Falls in the Last Year   Number of falls in the past 12 months 2   Type of Injury Associated with Fall Injury  (twisted ankle)   Patient Preference   Nickname (Patient Preference) Jeffjacob Carver   Psychosocial   Psychosocial (WDL) WDL   Restrictions/Precautions   Precautions Fall Risk;Limb alert  (R AKA)   Braces or Orthoses Prosthesis  (LE overnight boot; R AKA prosthesis)   Pain Assessment   Pain Assessment Tool Pain Assessment not indicated - pt denies pain  (occassionally get phantom pain and cramps)   Pain Score 4   Pain Location/Orientation Orientation: Left; Location: Leg   Eating Assessment   Dentures   (had dentures but they are broken)   Comprehension   QI: Comprehension 4  Undestands: Clear comprehension without cues or repetitions   Comprehension (FIM) 5 - Needs help/cues, repetition only RARELY ( < 10% of the time)   Expression   QI: Expression 4  Express complex messages without difficulty and with speech that is clear and easy to [place]   Expression (FIM) 5 - Needs help/cues only RARELY (< 10% of the time)   Social Interaction   Social Interaction (FIM) 5 - Interacts appropriately with others 90% of time   Problem Solving   Problem solving (FIM) 5 - Solves basic problems 90% of time   Memory   Memory (FIM) 5 - Recalls/performs request 90% of time   RUE Assessment   RUE Assessment WFL  (decreased  strength)   LUE Assessment   LUE Assessment WFL   Coordination   Movements are Fluid and Coordinated 0   Coordination and Movement Description   (delayed fine motor movements in R hand)   Sensation   Light Touch Partial deficits in the RUE  (numbness in R fingers)   Cognition   Overall Cognitive Status WFL   Arousal/Participation Alert; Responsive; Cooperative   Attention Within functional limits   Orientation Level Oriented X4   Memory Within functional limits   Following Commands Follows all commands and directions without difficulty   Comments Pt enjoys watches tv, goes to [facility], doctors Discharge Information   Patient's Discharge Plan return home   Impressions Pt seen today for OT IE  Pt presents supine in bed with HOB slightly elevated agreeable for OT session  Interview portion of eval completed and formal ADL eval will be completed tomorrow  Barriers include decreased independence in self care and functional mobility/transfers, decreased ability to manage RLE prosthetic, decreased balance, pain in phantom RLE, decreased strength in RUE and decreased activity tolerance  Pt will benefit from skilled OT services in order to address listed barriers and prepare for d/c    OT Therapy Minutes   OT Time In 1355   OT Time Out 1430   OT Total Time (minutes) 35   OT Mode of treatment - Individual (minutes) 35   Treatment and documentation completed in conjunction with OTS

## 2021-06-10 NOTE — PROGRESS NOTES
[address] [place] [address]  Progress Note Valentin Buckner 1960, 59 y o  male MRN: [id number]  Unit/Bed#: -01 Encounter: [id number]  Primary Care Provider: VERONICA Lainez   Date and time admitted to hospital: 6/10/2021 11:44 AM  Rehab Diagnosis: Impairment of mobility, safety and Activities of Daily Living (ADLs) due to Amputation:  05 3  Unilateral Lower Limb Above the Knee    History of Present Illness:   Kelly Kapoor is a 59 y o  male who presented to the [address] [place] [address] for acute rehabilitation services on 6/10/21  Prior to this admission the patient required an emergent right AKA secondary to ischemia of RLE  A suspected embolism and aortic thrombus  Surgery was performed on 1/14/21 bu Dr Tonya Tang  He discharged to acute rehab on 2/25/21  From [facility] he readmitted back to [facility] on 2/25/21 with increased SOB and hypoxia  He was discharged back to [place] acute rehab on 3/3/21  From [place] he discharged home  Pt received his prosthesis around 5/31 from [facility]  Patient accepted at [address] [place] [address] for prosthesis training secondary to right AKA  He has a PMH significant for right femoral DVT, anemia, aortic thrombus, Type 2 diabetes, Left hydropneumax, GERD, HTN, hypercholesterolemia, long-term methadone use due to chronic low back pain and cervical radiculopathy  Subjective: Patient is doing well and has no current complaints or questions      Review of Systems:   Consitutional: Negative  HENT: Positive for a "blur" in eye  CV: Negative  Resp: Negative  GI: Negative  Urinary: Negative  Musculoskeletal: Positive for left knee pain, positive for left ankle pain and swelling  Neuro: Negative  Psych: Negative  Endocrine: Negative  Hemat: Negative     Plan:     Chronic bilateral low back pain  Assessment & Plan  Continue methadone    Cervical radiculopathy  Assessment & Plan  Continue methadone     S/P AKA (above knee amputation), right [facility])  Assessment & Plan  Acute chomprehensive interdisciplinary inpatient rehabilitation to include intensive skilled therapies as outlines with oversight and management by a rehabilitation Physician Assistant overseen by rehabilitation physician as well as inpatient rehabilitation nursing, case management and weekly interdisciplinary team meeting      Type 2 diabetes mellitus, with long-term current use of insulin [facility])  Assessment & Plan  Lab Results   Component Value Date    HGBA1C 7 7 (H) 05/29/2021       No results for input(s): POCGLU in the last 72 hours  Blood Sugar Average: Last 72 hrs:  Continue lantus 17 units HS, Janumet 50/1000 BID  Monitor accu-cheks  Hypoglycemia protocol in place    Vitamin D deficiency  Assessment & Plan  Continue cholecalciferol 1,000 units    Methadone maintenance therapy patient [facility])  Assessment & Plan  Patient with chronic low back pain and cervical radiculopathy  Has naloxone in medication regimen     Hyperlipidemia associated with type 2 diabetes mellitus (HCC)  Assessment & Plan    Continue empagliflozain 25 mg oral for hyperlipidemia    HTN (hypertension)  Assessment & Plan  Continue lisinopril 10 mg daily  Monitor vitals daily      Confirmed methadone dosage with nephew and patient  Patient last took his dosage this morning          Scheduled Meds:  Current Facility-Administered Medications   Medication Dose Route Frequency Provider Last Rate    acetaminophen  975 mg Oral Q8H [address] Ori Gomes PA-C      albuterol  2 puff Inhalation Q4H PRN Ori Gomes PA-C      apixaban  5 mg Oral BID Ori Gomes PA-C      bisacodyl  10 mg Rectal Daily PRN Ori Gomes PA-C      cholecalciferol  2,000 Units Oral Daily Ori Gomes PA-C      Diclofenac Sodium  4 g Topical 4x Daily Ori Gomes PA-C      DULoxetine  30 mg Oral Daily Ori Gomes PA-C      Empagliflozin  25 mg Oral QAM Janina Vasquez PA-C      fluticasone  1 spray Nasal Daily Nemiah Fossa, PA-C      insulin glargine  17 Units Subcutaneous HS Nemiah Fossa, PA-C      isosorbide mononitrate  120 mg Oral Daily Nemiah Fossa, PA-C      lisinopril  10 mg Oral Daily Nemiah Fossa, PA-C      loratadine  10 mg Oral Daily Nemiah Fossa, PA-C      menthol-methyl salicylate   Apply externally 4x Daily PRN Nemiah Fossa, PA-C      methadone  70 mg Oral Daily Nemiah Fossa, PA-C      multivitamin-minerals  1 tablet Oral Daily Nemiah Fossa, PA-C      pantoprazole  20 mg Oral Daily Nemiah Fossa, PA-C      pregabalin  75 mg Oral TID Nemiah Fossa, PA-C      senna  2 tablet Oral Daily Nemiah Fossa, PA-C      sitaGLIPtin-metFORMIN (JANUMET 50/1000) combo dose   Oral BID Nemiah Fossa, PA-C         Restrictions include:  right lower extremity with new prosthesis Fall precautions      Functional History - Prior to Admission:      Functional Status: Needed prior assistance with self care, indoor mobility  His cognition he is indepdent  Patient lives in a multilevel home    Functional Status Upon Admission to [facility]:  Mobility: Min assist  Transfers: Min assist  ADLs: Dependent for LB ADLs independent for UB ADLs     Physical Exam:  Temp:  [97 9 °F (36 6 °C)] 97 9 °F (36 6 °C)  HR:  [99] 99  Resp:  [18] 18  BP: (112)/(63) 112/63  SpO2:  [96 %] 96 %    General:   alert, no apparent distress, cooperative and comfortable  HEENT:  Head: Normocephalic, no lesions, without obvious abnormality    Eye: Normal external eye, conjunctiva, lidsc cornea  Ears: Normal external ears  Nose: Normal external nose, mucus membranes  CARDIAC:  regular rate and rhythm, S1, S2 normal, no murmur, click, rub or gallop  LUNGS:  no abnormal respiratory pattern, no retractions noted, non-labored breathing   ABDOMEN:  soft, non-tender, non-distended  EXTREMITIES:  extremities normal, warm and well-perfused; no cyanosis, clubbing, or edema  NEURO:  clear speech, following all commands, oriented x4  PSYCH:  Alert and oriented, appropriate affect  Laboratory:          Invalid input(s): PLTCT        Invalid input(s): CA         Wt Readings from Last 1 Encounters:   02/28/21 66 kg (145 lb 8 1 oz)     Estimated body mass index is 22 12 kg/m² as calculated from the following:    Height as of 6/1/21: 5' 8" (1 727 m)  Weight as of 2/28/21: 66 kg (145 lb 8 1 oz)  Imaging: reviewed     Rehabilitation Prognosis: good     Tolerance for three hours of therapy a day: good     Family/Patient Goals:  Patient/family's goals: Return to previous home/apartment  Patient will receive PT and OT 90 minutes each per day, five days per week to achieve rehab goals or participate in 900 minutes of therapy within a 7 day week period  Mobility Goals: [place]  Transfer Goals: [place]  Activities of Daily Living (ADLs) Goals: [place]    Discharge Planning:  Rehabilitation and discharge goals discussed with the patient and/or family  Case Managment and Social Work to review patient/family resources and to coordinate Discharge Planning  Estimated length of stay: 10 to 14 days    Patient and Family Education and Training:  Rehabilitation and discharge goals discussed with the patient and/or family  Patient/family education/training needs to be discussed in weekly team meeting  Equipment/DME needs: Therapy teams to assess and evaluate for additional equipment/DME needs throughout rehabilitation stay    Past Medical History:   Diagnosis Date    Diabetes mellitus ([facility] )     GERD (gastroesophageal reflux disease)     Hypercholesteremia     Hypertension     Methadone use ([facility] )      Past Surgical History:   Procedure Laterality Date    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    Procedure: AMPUTATION ABOVE KNEE (AKA);   Surgeon: Kendra Vora MD;  [facility];  Service: Vascular    AMPUTATION ABOVE KNEE (AKA) Right 1/18/2021    Procedure: AMPUTATION ABOVE KNEE (AKA) FORMALIZATION,  R AKA wound washout, wound closure;  Surgeon: Renetta Sr MD;  [facility];  Service: Vascular    ARTERIOGRAM Right 1/13/2021    Procedure: ARTERIOGRAM;  Surgeon: DO Ally;  [facility];  Service: Vascular    IR CHEST TUBE PLACEMENT  2/10/2021    IR LOWER EXTREMITY ANGIOGRAM  1/14/2021    THROMBECTOMY W/ EMBOLECTOMY Right 1/13/2021    Procedure: EMBOLECTOMY/THROMBECTOMY LOWER EXTREMITY;  Surgeon: DO Ally;  [facility];  Service: Vascular      Family History   Problem Relation Age of Onset    Diabetes Mother     Hypertension Father     Leukemia Father     Acute lymphoblastic leukemia Father     Cancer Sister      Social History     Socioeconomic History    Marital status:      Spouse name: None    Number of children: None    Years of education: None    Highest education level: None   Occupational History    None   Social Needs    Financial resource strain: None    Food insecurity     Worry: None     Inability: None    Transportation needs     Medical: None     Non-medical: None   Tobacco Use    Smoking status: Former Smoker    Smokeless tobacco: Never Used   Substance and Sexual Activity    Alcohol use: Not Currently    Drug use: No     Comment: methadone    Sexual activity: None   Lifestyle    Physical activity     Days per week: None     Minutes per session: None    Stress: None   Relationships    Social connections     Talks on phone: None     Gets together: None     Attends Buddhist service: None     Active member of club or organization: None     Attends meetings of clubs or organizations: None     Relationship status: None    Intimate partner violence     Fear of current or ex partner: None     Emotionally abused: None     Physically abused: None     Forced sexual activity: None   Other Topics Concern    None   Social History Narrative    None       Patient Active Problem List   Diagnosis    Chronic hepatitis C without hepatic coma (HCC)    HTN (hypertension)    Hyperlipidemia associated with type 2 diabetes mellitus ([place] [address] )    Methadone maintenance therapy patient ([place] [address] )    Type 2 diabetes mellitus with diabetic polyneuropathy (HCC)    Callous ulcer, limited to breakdown of skin ([place] [address] )    Bilateral lower extremity edema    BMI 28 0-28 9,adult    Positive depression screening    Mononeuropathy, unspecified    Other and unspecified noninfectious gastroenteritis and colitis    Mixed hyperlipidemia    Proteinuria    Tobacco abuse counseling    Vitamin D deficiency    SOB (shortness of breath) on exertion    Type 2 diabetes mellitus, with long-term current use of insulin (HCC)    Acute respiratory failure with hypoxia (HCC)    Sepsis due to COVID-19 ([place] [address] )    Pneumonia due to COVID-19 virus    Acute pancreatitis    Hematuria    Aortic thrombus (HCC)    Anemia    Muscle weakness of right upper extremity    Oropharyngeal dysphagia    Edema of right upper extremity    Ischemia of right lower extremity with suspected rhabdomyolysis    Right femoral vein DVT (HCC)    Left Hydropneumothorax    RUE weakness    Prolonged Q-T interval on ECG    Hypoxia    Empyema lung (HCC)    Stage II pressure ulcer of sacral region (HCC)    S/P AKA (above knee amputation), right (HCC)    Neck pain    Cervical radiculopathy    Chronic bilateral low back pain    Above-knee amputation of right lower extremity (HCC)    Drug-induced constipation    Moderate protein-calorie malnutrition (HCC)    Diabetes mellitus (HCC)     Allergies   Allergen Reactions    Varenicline           Medical Necessity Criteria for ARC Admission: Hypertension, Bowel/Bladder Management and Diabetes requiring close blood glucose monitoring   In addition, the preadmission screen, post-admission physical evaluation, overall plan of care and admissions order demonstrate a reasonable expectation that the following criteria were met at the time of admission to the [facility]  1  The patient requires active and ongoing therapeutic intervention of multiple therapy disciplines (physical therapy, occupational therapy, speech-language pathology, or prosthetics/orthotics), one of which is physical or occupational therapy  2  Patient requires an intensive rehabilitation therapy program, as defined in Chapter 1, section 110 2 2 of the CMS Medicare Policy Manual  This intensive rehabilitation therapy program will consist of at least 3 hours of therapy per day at least 5 days per week or at least 15 hours of intensive rehabilitation therapy within a 7 consecutive day period, beginning with the date of admission to the [facility]  3  The patient is reasonably expected to actively participate in, and benefit significantly from, the intensive rehabilitation therapy program as defined in Chapter 1, section 110 2 2 of the CMS Medicare Policy Manual at this time of admission to the [facility]  He can reasonably be expected to make measurable improvement (that will be of practical value to improve the patients functional capacity or adaptation to impairments) as a result of the rehabilitation treatment, as defined in section 110 3, and such improvement can be expected to be made within the prescribed period of time  As noted in the CMS Medicare Policy Manual, the patient need not be expected to achieve complete independence in the domain of self-care nor be expected to return to his or her prior level of functioning in order to meet this standard  4  The patient must require physician supervision by a rehabilitation physician  As such, a rehabilitation physician will conduct face-to-face visits with the patient at least 3 days per week throughout the patients stay in the [facility] to assess the patient both medically and functionally, as well as to modify the course of treatment as needed to maximize the patients capacity to benefit from the rehabilitation process    5  The patient requires an intensive and coordinated interdisciplinary approach to providing rehabilitation, as defined in Chapter 1, section 110 2 5 of the CMS Medicare Policy Manual  This will be achieved through periodic team conferences, conducted at least once in a 7-day period, and comprising of an interdisciplinary team of medical professionals consisting of: a rehabilitation physician, registered nurse,  and/or , and a licensed/certified therapist from each therapy discipline involved in treating the patient  Changes Since Pre-admission Assessment: None -This patient's participation in rehab continues to be reasonable, necessary and appropriate  CMS Required Post-Admission Physician Evaluation Elements  History and Physical, including medical history, functional history and active comorbidities as in above text  Post-Admission Physician Evaluation:  The patient has the potential to make improvement and is in need of physical, occupational, and/or therapy services  The patient may also need nutritional services  Given the patient's complex medical condition and risk of further medical complications, rehabilitative services cannot be safely provided at a lower level of care, such as a skilled nursing facility  I have reviewed the patient's functional and medical status at the time of the preadmission screening and they are the same as on the day of this admission  I acknowledge that I have personally performed a full physical examination on this patient within 24 hours of admission  The patient and/or family demonstrated understanding the rehabilitation program and the discharge process after we discussed them  Agree in entirety: yes  Minor adaptions: none    Major changes: none     Rima Lou PA-C  Physical Medicine and Rehabilitation    ** Please Note: Fluency Direct voice to text software may have been used in the creation of this document   **

## 2021-06-11 DIAGNOSIS — E11.42 TYPE 2 DIABETES MELLITUS WITH DIABETIC POLYNEUROPATHY, WITH LONG-TERM CURRENT USE OF INSULIN (HCC): ICD-10-CM

## 2021-06-11 DIAGNOSIS — Z79.4 TYPE 2 DIABETES MELLITUS WITH DIABETIC POLYNEUROPATHY, WITH LONG-TERM CURRENT USE OF INSULIN (HCC): ICD-10-CM

## 2021-06-11 PROBLEM — I99.8 ISCHEMIA OF RIGHT LOWER EXTREMITY: Status: RESOLVED | Noted: 2021-02-06 | Resolved: 2021-06-11

## 2021-06-11 PROBLEM — E55.9 VITAMIN D DEFICIENCY: Chronic | Status: ACTIVE | Noted: 2019-12-05

## 2021-06-11 PROBLEM — L89.152 STAGE II PRESSURE ULCER OF SACRAL REGION (HCC): Status: RESOLVED | Noted: 2021-03-01 | Resolved: 2021-06-11

## 2021-06-11 PROBLEM — R60.0 EDEMA OF RIGHT UPPER EXTREMITY: Status: RESOLVED | Noted: 2021-02-06 | Resolved: 2021-06-11

## 2021-06-11 PROBLEM — K85.90 ACUTE PANCREATITIS: Status: RESOLVED | Noted: 2021-01-11 | Resolved: 2021-06-11

## 2021-06-11 PROBLEM — R06.02 SOB (SHORTNESS OF BREATH) ON EXERTION: Status: RESOLVED | Noted: 2020-06-18 | Resolved: 2021-06-11

## 2021-06-11 PROBLEM — M62.81 MUSCLE WEAKNESS OF RIGHT UPPER EXTREMITY: Status: RESOLVED | Noted: 2021-01-30 | Resolved: 2021-06-11

## 2021-06-11 PROBLEM — K52.9 OTHER AND UNSPECIFIED NONINFECTIOUS GASTROENTERITIS AND COLITIS: Status: RESOLVED | Noted: 2019-11-07 | Resolved: 2021-06-11

## 2021-06-11 PROBLEM — A41.89 SEPSIS DUE TO COVID-19 (HCC): Status: RESOLVED | Noted: 2021-01-11 | Resolved: 2021-06-11

## 2021-06-11 PROBLEM — J12.82 PNEUMONIA DUE TO COVID-19 VIRUS: Status: RESOLVED | Noted: 2021-01-11 | Resolved: 2021-06-11

## 2021-06-11 PROBLEM — D64.9 ANEMIA: Status: RESOLVED | Noted: 2021-01-25 | Resolved: 2021-06-11

## 2021-06-11 PROBLEM — E11.9 TYPE 2 DIABETES MELLITUS, WITH LONG-TERM CURRENT USE OF INSULIN (HCC): Chronic | Status: ACTIVE | Noted: 2021-01-11

## 2021-06-11 PROBLEM — J94.8 HYDROPNEUMOTHORAX: Status: RESOLVED | Noted: 2021-02-10 | Resolved: 2021-06-11

## 2021-06-11 PROBLEM — R09.02 HYPOXIA: Status: RESOLVED | Noted: 2021-02-27 | Resolved: 2021-06-11

## 2021-06-11 PROBLEM — U07.1 SEPSIS DUE TO COVID-19 (HCC): Status: RESOLVED | Noted: 2021-01-11 | Resolved: 2021-06-11

## 2021-06-11 PROBLEM — R13.12 OROPHARYNGEAL DYSPHAGIA: Status: RESOLVED | Noted: 2021-01-30 | Resolved: 2021-06-11

## 2021-06-11 PROBLEM — M54.2 NECK PAIN: Status: RESOLVED | Noted: 2021-04-26 | Resolved: 2021-06-11

## 2021-06-11 PROBLEM — Z71.6 TOBACCO ABUSE COUNSELING: Status: RESOLVED | Noted: 2019-11-07 | Resolved: 2021-06-11

## 2021-06-11 PROBLEM — U07.1 PNEUMONIA DUE TO COVID-19 VIRUS: Status: RESOLVED | Noted: 2021-01-11 | Resolved: 2021-06-11

## 2021-06-11 PROBLEM — R29.898 RUE WEAKNESS: Status: RESOLVED | Noted: 2021-02-14 | Resolved: 2021-06-11

## 2021-06-11 PROBLEM — I82.411 RIGHT FEMORAL VEIN DVT (HCC): Chronic | Status: ACTIVE | Noted: 2021-02-09

## 2021-06-11 LAB
ALBUMIN SERPL BCP-MCNC: 3.6 G/DL (ref 3.5–5.7)
ALP SERPL-CCNC: 86 U/L (ref 55–165)
ALT SERPL W P-5'-P-CCNC: 18 U/L (ref 7–52)
ANION GAP SERPL CALCULATED.3IONS-SCNC: 7 MMOL/L (ref 4–13)
AST SERPL W P-5'-P-CCNC: 19 U/L (ref 13–39)
BASOPHILS # BLD AUTO: 0.1 THOUSANDS/ΜL (ref 0–0.1)
BASOPHILS NFR BLD AUTO: 1 % (ref 0–2)
BILIRUB SERPL-MCNC: 0.5 MG/DL (ref 0.2–1)
BUN SERPL-MCNC: 29 MG/DL (ref 7–25)
CALCIUM SERPL-MCNC: 10.2 MG/DL (ref 8.6–10.5)
CHLORIDE SERPL-SCNC: 98 MMOL/L (ref 98–107)
CO2 SERPL-SCNC: 32 MMOL/L (ref 21–31)
CREAT SERPL-MCNC: 0.75 MG/DL (ref 0.7–1.3)
EOSINOPHIL # BLD AUTO: 0.3 THOUSAND/ΜL (ref 0–0.61)
EOSINOPHIL NFR BLD AUTO: 4 % (ref 0–5)
ERYTHROCYTE [DISTWIDTH] IN BLOOD BY AUTOMATED COUNT: 15.7 % (ref 11.5–14.5)
GFR SERPL CREATININE-BSD FRML MDRD: 98 ML/MIN/1.73SQ M
GLUCOSE SERPL-MCNC: 206 MG/DL (ref 65–140)
GLUCOSE SERPL-MCNC: 92 MG/DL (ref 65–99)
HCT VFR BLD AUTO: 38.1 % (ref 42–47)
HGB BLD-MCNC: 12.8 G/DL (ref 14–18)
LYMPHOCYTES # BLD AUTO: 2.2 THOUSANDS/ΜL (ref 0.6–4.47)
LYMPHOCYTES NFR BLD AUTO: 32 % (ref 21–51)
MCH RBC QN AUTO: 27.2 PG (ref 26–34)
MCHC RBC AUTO-ENTMCNC: 33.7 G/DL (ref 31–37)
MCV RBC AUTO: 81 FL (ref 81–99)
MONOCYTES # BLD AUTO: 0.6 THOUSAND/ΜL (ref 0.17–1.22)
MONOCYTES NFR BLD AUTO: 9 % (ref 2–12)
NEUTROPHILS # BLD AUTO: 3.7 THOUSANDS/ΜL (ref 1.4–6.5)
NEUTS SEG NFR BLD AUTO: 54 % (ref 42–75)
PLATELET # BLD AUTO: 182 THOUSANDS/UL (ref 149–390)
PMV BLD AUTO: 8.8 FL (ref 8.6–11.7)
POTASSIUM SERPL-SCNC: 4.4 MMOL/L (ref 3.5–5.5)
PROT SERPL-MCNC: 8.2 G/DL (ref 6.4–8.9)
RBC # BLD AUTO: 4.71 MILLION/UL (ref 4.3–5.9)
SODIUM SERPL-SCNC: 137 MMOL/L (ref 134–143)
WBC # BLD AUTO: 6.8 THOUSAND/UL (ref 4.8–10.8)

## 2021-06-11 PROCEDURE — 97530 THERAPEUTIC ACTIVITIES: CPT

## 2021-06-11 PROCEDURE — 97116 GAIT TRAINING THERAPY: CPT

## 2021-06-11 PROCEDURE — 97110 THERAPEUTIC EXERCISES: CPT

## 2021-06-11 PROCEDURE — 80053 COMPREHEN METABOLIC PANEL: CPT | Performed by: PHYSICAL MEDICINE & REHABILITATION

## 2021-06-11 PROCEDURE — 99254 IP/OBS CNSLTJ NEW/EST MOD 60: CPT | Performed by: PHYSICIAN ASSISTANT

## 2021-06-11 PROCEDURE — 97162 PT EVAL MOD COMPLEX 30 MIN: CPT

## 2021-06-11 PROCEDURE — 82948 REAGENT STRIP/BLOOD GLUCOSE: CPT

## 2021-06-11 PROCEDURE — 97542 WHEELCHAIR MNGMENT TRAINING: CPT

## 2021-06-11 PROCEDURE — 97535 SELF CARE MNGMENT TRAINING: CPT

## 2021-06-11 PROCEDURE — 85025 COMPLETE CBC W/AUTO DIFF WBC: CPT | Performed by: PHYSICAL MEDICINE & REHABILITATION

## 2021-06-11 PROCEDURE — 99232 SBSQ HOSP IP/OBS MODERATE 35: CPT | Performed by: FAMILY MEDICINE

## 2021-06-11 RX ORDER — ACETAMINOPHEN 325 MG/1
975 TABLET ORAL EVERY 8 HOURS SCHEDULED
Status: DISCONTINUED | OUTPATIENT
Start: 2021-06-11 | End: 2021-06-25 | Stop reason: HOSPADM

## 2021-06-11 RX ORDER — EMPAGLIFLOZIN 25 MG/1
25 TABLET, FILM COATED ORAL EVERY MORNING
Qty: 90 TABLET | Refills: 1 | Status: SHIPPED | OUTPATIENT
Start: 2021-06-11 | End: 2021-06-24 | Stop reason: SDUPTHER

## 2021-06-11 RX ADMIN — Medication 2000 UNITS: at 08:15

## 2021-06-11 RX ADMIN — PREGABALIN 75 MG: 75 CAPSULE ORAL at 18:23

## 2021-06-11 RX ADMIN — DICLOFENAC SODIUM 4 G: 10 GEL TOPICAL at 08:14

## 2021-06-11 RX ADMIN — ACETAMINOPHEN 975 MG: 325 TABLET ORAL at 21:20

## 2021-06-11 RX ADMIN — PREGABALIN 75 MG: 75 CAPSULE ORAL at 08:15

## 2021-06-11 RX ADMIN — INSULIN GLARGINE 17 UNITS: 100 INJECTION, SOLUTION SUBCUTANEOUS at 21:20

## 2021-06-11 RX ADMIN — APIXABAN 5 MG: 5 TABLET, FILM COATED ORAL at 08:15

## 2021-06-11 RX ADMIN — LISINOPRIL 10 MG: 10 TABLET ORAL at 08:16

## 2021-06-11 RX ADMIN — Medication 1 TABLET: at 08:15

## 2021-06-11 RX ADMIN — ACETAMINOPHEN 650 MG: 325 TABLET ORAL at 05:33

## 2021-06-11 RX ADMIN — DULOXETINE HYDROCHLORIDE 30 MG: 30 CAPSULE, DELAYED RELEASE ORAL at 21:20

## 2021-06-11 RX ADMIN — METFORMIN HYDROCHLORIDE: 500 TABLET, FILM COATED ORAL at 08:15

## 2021-06-11 RX ADMIN — FLUTICASONE PROPIONATE 1 SPRAY: 50 SPRAY, METERED NASAL at 08:13

## 2021-06-11 RX ADMIN — MAGNESIUM OXIDE TAB 400 MG (241.3 MG ELEMENTAL MG) 400 MG: 400 (241.3 MG) TAB at 18:23

## 2021-06-11 RX ADMIN — METFORMIN HYDROCHLORIDE: 500 TABLET, FILM COATED ORAL at 18:23

## 2021-06-11 RX ADMIN — PANTOPRAZOLE SODIUM 20 MG: 20 TABLET, DELAYED RELEASE ORAL at 08:16

## 2021-06-11 RX ADMIN — ACETAMINOPHEN 975 MG: 325 TABLET ORAL at 14:25

## 2021-06-11 RX ADMIN — PREGABALIN 75 MG: 75 CAPSULE ORAL at 21:24

## 2021-06-11 RX ADMIN — DOCUSATE SODIUM 100 MG: 100 CAPSULE, LIQUID FILLED ORAL at 18:23

## 2021-06-11 RX ADMIN — INSULIN LISPRO 1 UNITS: 100 INJECTION, SOLUTION INTRAVENOUS; SUBCUTANEOUS at 22:59

## 2021-06-11 RX ADMIN — METHADONE HYDROCHLORIDE 70 MG: 10 TABLET ORAL at 08:16

## 2021-06-11 RX ADMIN — DICLOFENAC SODIUM 4 G: 10 GEL TOPICAL at 18:30

## 2021-06-11 RX ADMIN — MAGNESIUM OXIDE TAB 400 MG (241.3 MG ELEMENTAL MG) 400 MG: 400 (241.3 MG) TAB at 08:16

## 2021-06-11 RX ADMIN — APIXABAN 5 MG: 5 TABLET, FILM COATED ORAL at 18:23

## 2021-06-11 RX ADMIN — LORATADINE 10 MG: 10 TABLET ORAL at 21:20

## 2021-06-11 NOTE — PROGRESS NOTES
06/11/21 1030   Patient Data   Rehab Impairment Prosthetic training; recent R AKA   Support System   Name Say William nephew   Able to provide 24 hour supervision Yes   Able to provide physical help? Yes   Home Setup   Type of Home Multi Level   Method of Entry Ramp   Number of Stairs in Home 13  (patient stays on first floor)   Available Equipment Roller Valdene [place]; Wheelchair;Drop Arm Commode  (hospital bed; transfer board)   Prior Level of Function   Indoor-Mobility (Ambulation) 2  Needed Some Help - Patient needed a partial assistance from another person to complete activities  Stairs 9  Not Applicable   Restrictions/Precautions   Precautions Fall Risk;Bed/chair alarms   Braces or Orthoses Prosthesis  (RLE)   Pain Assessment   Pain Assessment Tool 0-10   Pain Score 5   Pain Location/Orientation Orientation: Left; Location: Leg   Transfer Bed/Chair/Wheelchair   Limitations Noted In Balance;Pain Management;LE Strength   Adaptive Equipment Transfer Board   Type of Assistance Needed Physical assistance   Amount of Physical Assistance Provided 25%-49%   Comment MIN A   Chair/Bed-to-Chair Transfer CARE Score 3   Roll Left and Right   Type of Assistance Needed Supervision   Comment bed rails   Roll Left and Right CARE Score 4   Sit to Lying   Type of Assistance Needed Supervision   Comment bed rails   Sit to Lying CARE Score 4   Lying to Sitting on Side of Bed   Type of Assistance Needed Supervision   Comment bed rails   Lying to Sitting on Side of Bed CARE Score 4   Sit to Stand   Type of Assistance Needed Physical assistance   Amount of Physical Assistance Provided 25%-49%   Comment MIN A with RW and prosthesis   Sit to Stand CARE Score 3   Picking Up Object   Reason if not Attempted Safety concerns   Picking Up Object CARE Score 88   Car Transfer   Reason if not Attempted Safety concerns   Car Transfer CARE Score 88   Ambulation   Primary Mode of Locomotion Prior to Admission Wheelchair   Distance Celeste (feet) 13 ft  (13' 11')   Assist Device Roller Walker   Gait Pattern Antalgic; Inconsistant Cecelia; Slow Cecelia;Decreased foot clearance;Narrow DEACON;Step to   Limitations Noted In Balance; Coordination;Device Management; Heel Strike; Safety;Strength   Provided Assistance with: Balance   Walk Assist Level Minimum Assist;Moderate Assist   Findings min-mod assist level surfaces   Walk 10 Feet   Type of Assistance Needed Physical assistance   Amount of Physical Assistance Provided 50%-74%   Comment min-mod assist   Walk 10 Feet CARE Score 2   Walk 50 Feet with Two Turns   Reason if not Attempted Safety concerns   Walk 50 Feet with Two Turns CARE Score 88   Walk 150 Feet   Reason if not Attempted Safety concerns   Walk 150 Feet CARE Score 88   Walking 10 Feet on Uneven Surfaces   Reason if not Attempted Safety concerns   Walking 10 Feet on Uneven Surfaces CARE Score 88   Wheelchair mobility   Type of Wheelchair Used 1  Manual   Method Right upper extremity; Left upper extremity; Left lower extremity   Distance Level Surface (feet) 197 ft  (197' 156')   Distance Wheeled 3% Grade 21 ft   Findings S/mod I level and unlevel surfaces   Wheel 50 Feet with Two Turns   Type of Assistance Needed Supervision   Wheel 50 Feet with Two Turns CARE Score 4   Wheel 150 Feet   Type of Assistance Needed Supervision   Wheel 150 Feet CARE Score 4   Curb or Single Stair   Reason if not Attempted Activity not applicable   1 Step (Curb) CARE Score 9   4 Steps   Reason if not Attempted Activity not applicable   4 Steps CARE Score 9   12 Steps   Reason if not Attempted Activity not applicable   12 Steps CARE Score 9   Stairs   Findings patient has no steps at home; uses ramp to enter and has first floor setup   Comprehension   QI: Comprehension 4  Undestands: Clear comprehension without cues or repetitions   Comprehension (FIM) 5 - Needs help/cues, repetition only RARELY ( < 10% of the time)   Expression   QI: Expression 4   Express complex messages without difficulty and with speech that is clear and easy to [place]   Expression (FIM) 5 - Needs help/cues only RARELY (< 10% of the time)   Social Interaction   Social Interaction (FIM) 5 - Interacts appropriately with others 90% of time   Problem Solving   Problem solving (FIM) 5 - Solves basic problems 90% of time   Memory   Memory (FIM) 5 - Needs cueing reminders <10%   RLE Assessment   RLE Assessment   (hip ROM WFL; knee and ankle NA)   Strength RLE   R Hip Flexion 4/5   R Hip Extension 4/5   R Hip ABduction 4/5   R Hip ADduction 4/5   LLE Assessment   LLE Assessment   (ROM WFL: + drop footl; dependent don/doff prosthesis)   Strength LLE   L Hip Flexion 4/5   L Hip Extension 4/5   L Hip ABduction 4/5   L Hip ADduction 4/5   L Knee Flexion 4/5   L Knee Extension 4/5   L Ankle Dorsiflexion 1/5   L Ankle Plantar Flexion 3/5   Coordination   Movements are Fluid and Coordinated 1   Sensation   Light Touch No apparent deficits   Propioception No apparent deficits   Cognition   Overall Cognitive Status WFL   Arousal/Participation Alert; Responsive; Cooperative   Attention Within functional limits   Orientation Level Oriented X4   Memory Within functional limits   Following Commands Follows all commands and directions without difficulty   Therapeutic Exercise   Therapeutic Exercise/Activity seated ther ex   Discharge Information   Vocational Plan Retired/not working  (was working; unsure if he will be able to return)   Barriers to Return to [facility]; Endurance; Environmental Access   Patient's Discharge Plan return home with family   Patient's Rehab Expectations "walk with this leg"   Barriers to Discharge Home Decreased Strength;Decreased Endurance; Safety Considerations   Impressions Patient seen for IE  Presents for prosthetic training following amupation in January 2021  Patient with decreased ROM/strength, decreased balance and safety, decreased endurance; and pain; all affecting functional mobility  WOuld benefit from continued inpatient ARC PT to increase function, safety, and increased independence in prep for safe d/c to home   PT Therapy Minutes   PT Time In 1030   PT Time Out 1200   PT Total Time (minutes) 90   PT Mode of treatment - Individual (minutes) 90   PT Mode of treatment - Concurrent (minutes) 0   PT Mode of treatment - Group (minutes) 0   PT Mode of treatment - Co-treat (minutes) 0   PT Mode of Treatment - Total time(minutes) 90 minutes   PT Cumulative Minutes 90   Cumulative Minutes   Cumulative therapy minutes 90

## 2021-06-11 NOTE — CASE MANAGEMENT
Initial assessment & orientation to ARC with Pt, who expressed understanding & agreement  Message left for Pt's nephew  Pt resides in a multi-story home with his 2 nephews, sister & brother, and will have 24 hour supervision & assistance  There is a FF set up & ramp entrance  Pt has a WC, SPC, QC, DAC, SB, but does NOT have a walker  Discussed role of team members & reviewed [address] with Pt, who expressed understanding & agreement  SW will continue to monitor & assist as needed with [address]  See UR section for insurance information

## 2021-06-11 NOTE — PROGRESS NOTES
Physical Medicine and Rehabilitation Progress Note  Darryl Dempsey 59 y o  male MRN: [id number]  Unit/Bed#: -01 Encounter: [id number]    HPI: Merced Piper a 59 y  o  male who presented to the [address] [place] [address] for acute rehabilitation services on 6/10/21  Prior to this admission the patient required an emergent right AKA secondary to ischemia of RLE  A suspected embolism and aortic thrombus  Surgery was performed on 1/14/21 bu Dr Lucy Anne  He discharged to acute rehab on 2/25/21  [facility] back to [address] [place] [address] on 2/25/21 with increased SOB and hypoxia  He was discharged back to [facility] on 3/3/21  From [facility] he discharged home  Pt received his prosthesis around 5/31 from [facility]  Patient accepted at [address] [place] [address] for prosthesis training secondary to right AKA  He has a PMH significant for right femoral DVT, anemia, aortic thrombus, Type 2 diabetes, Left hydropneumax, GERD, HTN, hypercholesterolemia, long-term methadone use due to chronic low back pain and cervical radiculopathy        Subjective:  Complaining of mild pain in both his right leg and left leg    ROS: A 10 point ROS was performed; negative except as noted above         Assessment/Plan:  S/P AKA (above knee amputation), right [facility])  Assessment & Plan  Acute chomprehensive interdisciplinary inpatient rehabilitation to include intensive skilled therapies as outlines with oversight and management by a rehabilitation Physician Assistant overseen by rehabilitation physician as well as inpatient rehabilitation nursing, case management and weekly interdisciplinary team meeting        Type 2 diabetes mellitus, with long-term current use of insulin [facility])  Assessment & Plan        Lab Results   Component Value Date     HGBA1C 7 7 (H) 05/29/2021         No results for input(s): POCGLU in the last 72 hours      Blood Sugar Average: Last 72 hrs:  Continue lantus 17 units HS, Janumet 50/1000 BID  Monitor accu-cheks  Hypoglycemia protocol in place     Vitamin D deficiency  Assessment & Plan  Continue cholecalciferol 1,000 units     Methadone maintenance therapy patient [facility])  Assessment & Plan  Patient with chronic low back pain and cervical radiculopathy  Has naloxone in medication regimen      Hyperlipidemia associated with type 2 diabetes mellitus ([facility] [address] )  Assessment & Plan     Continue empagliflozain 25 mg oral for hyperlipidemia     HTN (hypertension)  Assessment & Plan  Continue lisinopril 10 mg daily  Monitor vitals daily                       Scheduled Meds:  Current Facility-Administered Medications   Medication Dose Route Frequency Provider Last Rate    acetaminophen  975 mg Oral [facility] Neelima Stewart MD      albuterol  2 puff Inhalation Q4H PRN [facility], PA-C      apixaban  5 mg Oral BID [facility], PA-C      bisacodyl  10 mg Rectal Daily PRN [facility], PA-C      cholecalciferol  2,000 Units Oral Daily [facility], PA-C      Diclofenac Sodium  4 g Topical 4x Daily PRN [facility], PA-C      docusate sodium  100 mg Oral BID [facility], PA-C      DULoxetine  30 mg Oral HS Miles Jac, PA-C      Empagliflozin  25 mg Oral QAM [facility], PA-C      fluticasone  1 spray Nasal Daily [facility], PA-C      insulin glargine  17 Units Subcutaneous HS Miles Jac, PA-C      lisinopril  10 mg Oral Daily Miles Jac, PA-C      loratadine  10 mg Oral HS Miles Jac, PA-C      magnesium oxide  400 mg Oral BID Miles Jac, PA-C      methadone  70 mg Oral Daily [facility], PA-C      multivitamin-minerals  1 tablet Oral Daily Miles Jac, PA-C      pantoprazole  20 mg Oral Daily Miles Jac, PA-C      pregabalin  75 mg Oral TID [facility], PA-C      sitaGLIPtin-metFORMIN (JANUMET 50/1000) combo dose   Oral BID [facility], PA-C         Objective:    FunctionalUpdate:  Mobility:  Bed chair bound Transfers:  Minimal assistance  ADLs:  Supervision upper body moderate assistance lower body dressing          Physical Exam:  Temp:  [97 2 °F (36 2 °C)-97 4 °F (36 3 °C)] 97 2 °F (36 2 °C)  HR:  [65-74] 74  Resp:  [18] 18  BP: (109-126)/(69-73) 126/69  SpO2:  [94 %-98 %] 98 %    General: alert, no apparent distress, cooperative and comfortable  HEENT:  Head: Normocephalic, no lesions, without obvious abnormality  Eye: Normal external eye, conjunctiva, lidsc cornea  Ears: Normal external ears  Nose: Normal external nose, mucus membranes  CARDIAC:  regular rate and rhythm, S1, S2 normal, no murmur, click, rub or gallop  LUNGS:  no abnormal respiratory pattern, no retractions noted, non-labored breathing   ABDOMEN:  soft, non-tender, non-distended  EXTREMITIES:  Right BKA  NEURO:  clear speech, following all commands, oriented There are no focal neurological deficits  PSYCH:  Alert and oriented, appropriate affect  Diagnostic Studies:   No orders to display       Laboratory: Reviewed  Results from last 7 days   Lab Units 06/11/21  0535   HEMOGLOBIN g/dL 12 8*   HEMATOCRIT % 38 1*   WBC Thousand/uL 6 80     Results from last 7 days   Lab Units 06/11/21  0535   BUN mg/dL 29*   SODIUM mmol/L 137   POTASSIUM mmol/L 4 4   CHLORIDE mmol/L 98   CREATININE mg/dL 0 75   AST U/L 19   ALT U/L 18            ** Please Note: Fluency Direct voice to text software may have been used in the creation of this document   **

## 2021-06-11 NOTE — TREATMENT PLAN
Individualized Plan of [address] 59 y o  male MRN: [id number]  Unit/Bed#: -01 Encounter: [id number]     PATIENT INFORMATION  ADMISSION DATE: 6/10/2021 11:44 AM TAMELA CATEGORY:Amputation:  05 4  Unilateral Lower Limb Below the Knee   ADMISSION DIAGNOSIS: Hx of AKA (above knee amputation), right ([address] ) [[id number]]  EXPECTED LOS: 10-14 days     MEDICAL/FUNCTIONAL PROGNOSIS  Based on my assessment of the patient's medical conditions and current functional status, the prognosis for attaining medical and functional goals or the IRF stay is:  Good    Medical Goals: Patient will be medically stable for discharge to [facility] upon completion of rehab program    [address]: Home - alone      ANTICIPATED FOLLOW-UP SERVICE:       Home Health Services: PT and OT      DISCIPLINE SPECIFIC PLANS:  Required Disciplines & Services: Rehabillitation Nursing and Case Management    REQUIRED THERAPY:  Therapy Hours per Day Days per Week Total Days   Physical Therapy 1 5 5 5   Occupational Therapy 1 5 5 5   NOTE: Additional therapy time(s) may be added as appropriate to meet patient needs and to achieve functional goals          Patient will either participate in above therapy regimen or participate in 900 minutes of therapy within 7 day week consisting of PT and OT due to the following medical procedure/condition:Amputation:  05 4  Unilateral Lower Limb Below the Knee    ANTICIPATED FUNCTIONAL OUTCOMES:  ADL: Patient will require supervision with ADLs with least restrictive device upon completion of rehab program   Bladder/Bowel:   continent   Transfers:   supervision   Locomotion:   ambulate with walker   Cognitive:       DISCHARGE PLANNING NEEDS  Equipment needs: Discharge needs to be reviewed with team      REHAB ANTICIPATED PARTICIPATION RESTRICTIONS:  Amubulate Short Distances Only and Assist with Mobility

## 2021-06-11 NOTE — PCC CARE MANAGEMENT
Initial assessment & orientation to ARC with Pt, who expressed understanding & agreement  Message left for Pt's nephew  Pt resides in a multi-story home with his 2 nephews, sister & brother, and will have 24 hour supervision & assistance  There is a FF set up & ramp entrance  Pt has a WC, SPC, QC, DAC, SB, but does NOT have a walker  Discussed role of team members & reviewed [address] with Pt, who expressed understanding & agreement  SW will continue to monitor & assist as needed with [address]  See UR section for insurance information  6/16/21 - Tx team recommendations reviewed with patient & nephew, who expressed understanding & agreement  Target DC date is 6/25 with [facility] (Nsg, PT, OT); a list of providers was provided to Pt & a referral will be made based on Pt preference  SW will continue to monitor & assist as needed with Tx & DC planning     6/23/21 - Tx team recommendations reviewed with patient & family, who expressed understanding & agreement  DC date is planned for 6/25 at 1 PM with [facility] (Nsg, PT, OT); a list of providers was provided to Pt & a referral made to [facility] based on Pt preference as he utilized them prior  Per PA, there has been difficulty in getting oral diabetic meds approved for this Pt and he may potentially be sent home on Insulin - Pt has been on Insulin in the past & his nephew administers & is comfortable resuming this if necesarry  RW ordered & commode  SW will continue to monitor & assist as needed with Tx & DC planning

## 2021-06-11 NOTE — PROGRESS NOTES
06/11/21 0930   Pain Assessment   Pain Assessment Tool Pain Assessment not indicated - pt denies pain   Restrictions/Precautions   Precautions Fall Risk;Limb alert  (new prosthesis trainnig RLE)   Weight Bearing Restrictions No   ROM Restrictions No   Eating   Type of Assistance Needed Set-up / clean-up   Eating CARE Score 5   Eating Assessment   Food To Mouth Yes   Able To Cut Yes   Meal Assessed Breakfast   Opens Packages No   Oral Hygiene   Type of Assistance Needed Set-up / clean-up   Comment rinsing with mouthwash   Oral Hygiene CARE Score 5   Grooming   Able To Initiate Tasks;Comb/Brush Hair;Wash/Dry Face;Brush/Clean Teeth;Wash/Dry Hands   Findings s/u   Shower/Bathe Self   Type of Assistance Needed Physical assistance   Amount of Physical Assistance Provided Less than 25%   Shower/Bathe Self CARE Score 3   Bathing   Assessed Bath Style Sponge Bath   Anticipated D/C Bath Style Sponge Bath; Shower   Able to [place]hoa Silvio No   Able to [employer] Temperature No   Able to Wash/Rinse/Dry (body part) Left Arm;Right Arm;L Upper Leg;R Upper Leg;Chest;Abdomen;Perineal Area; Buttocks  (recent R AKA)   Limitations Noted in Balance; Endurance;Problem Solving;ROM;Safety;Strength  (recent R AKA)   Positioning Seated;Standing   Findings  able to lean side to side for hygiene on large [facility]   Tub/Shower Transfer   Reason Not Assessed Sponge Bath;Endurance; Safety   Upper Body Dressing   Type of Assistance Needed Set-up / clean-up   Upper Body Dressing CARE Score 5   Lower Body Dressing   Type of Assistance Needed Physical assistance   Amount of Physical Assistance Provided Less than 25%   Lower Body Dressing CARE Score 3   Putting On/Taking Off Footwear   Type of Assistance Needed Physical assistance   Amount of Physical Assistance Provided 75% or more   Comment min A L sock and shoe, Max A RLE sleeve,  and prosthesis, increased time required due to difficulty lining up pin with hole and increased edema residual limb with recommendations for use of  for edema management   Putting On/Taking Off Footwear CARE Score 2   Picking Up Object   Reason if not Attempted Safety concerns   Picking Up Object CARE Score 88   Dressing/Undressing Clothing   Remove UB Clothes Pullover [address] UB Clothes Pullover Shirt   Remove LB Clothes Pants;Socks   Don LB Clothes Pants;Socks   Limitations Noted In Balance; Endurance;Problem Solving; Safety;Strength;ROM  (recent R AKA)   Positioning Supported Sit;Standing   Bed-Chair Transfer   Type of Assistance Needed Incidental touching   Comment using transfer board after placement   Chair/Bed-to-Chair Transfer CARE Score 4   Toileting Hygiene   Type of Assistance Needed Incidental touching   Abel Varner 83 Score 4   [facility] to [address] down yes, up yes  Manage Hygiene Bladder; Bowel   Limitations Noted In Balance;Problem Solving;ROM;Safety;LE Strength   Adaptive Equipment Grab Bar   Findings pt able to lean side to side for clothing management and hygiene on wide commode   Toilet Transfer   Type of Assistance Needed Incidental touching   Toilet Transfer CARE Score 4   Toilet Transfer   Surface Assessed Drop Arm Commode   Transfer Technique Slide Board   Limitations Noted In Balance; Endurance;Problem Solving;ROM;Safety;LE Strength  (without prosthesis)   Cognition   Overall Cognitive Status WFL   Arousal/Participation Alert; Responsive; Cooperative   Attention Within functional limits   Orientation Level Oriented X4   Memory Within functional limits   Following Commands Follows all commands and directions without difficulty   Assessment   Treatment Assessment Pt participates in ADLs, toileting and transfers with increased focus on donning sleeve to RLE, prothesis and then  with education provided  Pt requires assist due to decreased balance, safety, endurance and R hand strength and coordination with new prothesis training   Pt will benefit from continued skilled OT services to increase independence with daily tasks  Problem List Decreased strength;Decreased range of motion;Decreased endurance; Impaired balance;Decreased safety awareness  (recent R AKA)   Plan   Treatment/Interventions ADL retraining;Functional transfer training; Therapeutic exercise; Endurance training;Patient/family training;Equipment eval/education; Compensatory technique education   Progress Progressing toward goals   OT Therapy Minutes   OT Time In 0930   OT Time Out 1030   OT Total Time (minutes) 60   OT Mode of treatment - Individual (minutes) 60   Therapy Time missed   Time missed?  No

## 2021-06-11 NOTE — PROGRESS NOTES
06/11/21 1326   Pain Assessment   Pain Assessment Tool 0-10   Restrictions/Precautions   Precautions Bed/chair alarms; Fall Risk   Eating Assessment   Findings Pt completed simulated task of built up handle utensils after Ed  to use for R hand weakness   Bed-Chair Transfer   Type of Assistance Needed Supervision  (Simultaneous filing  User may not have seen previous data )   Comment placed sliding board under Pt    Chair/Bed-to-Chair Transfer CARE Score 4  (Simultaneous filing  User may not have seen previous data )   Transfer Bed/Chair/Wheelchair   Adaptive Equipment Transfer Board   Therapeutic Excerise-Strength   UE Strength Yes   Exercise Tools   UE Ergometer 3 min forward 2 minutes back   Other Exercise Tool 1 2x15 2# dowel UE Thera Ex in multiple planes   Cognition   Overall Cognitive Status WFL   Orientation Level Oriented X4   Additional Activities   Additional Activities Comments functional mobility from room to therapy room   Activity Tolerance   Activity Tolerance Patient tolerated treatment well   Assessment   Treatment Assessment Intro  to adaptive equip to aide in dressing and aides for independent eating  Pt demonstrated good understanding in simulated activity  Pt tolerated strength exercise well but tired quickly during endurance activities  Will continue to work on increasing activity level in future sessions  Pt recieved 45 minutes of concurrent therapy to work on similar goals with a Pt with similar deficits  Pt is progressing well it is recommended he continue skilled OT intervention to increase progress toward goals and functional independence  Problem List Decreased strength;Decreased range of motion;Decreased endurance;Decreased coordination   Plan   Treatment/Interventions ADL retraining; Therapeutic exercise; Endurance training;Equipment eval/education; Compensatory technique education;OT   Progress Progressing toward goals   OT Therapy Minutes   OT Time In 1326   OT Time Out 1411   OT Total Time (minutes) 45   OT Mode of treatment - Individual (minutes) 0   OT Mode of treatment - Concurrent (minutes) 45   OT Mode of treatment - Group (minutes) 0   OT Mode of treatment - Co-treat (minutes) 0   OT Mode of Treatment - Total time(minutes) 45 minutes   OT Cumulative Minutes 45   Therapy Time missed   Time missed?  No

## 2021-06-12 LAB
GLUCOSE SERPL-MCNC: 152 MG/DL (ref 65–140)
GLUCOSE SERPL-MCNC: 193 MG/DL (ref 65–140)
GLUCOSE SERPL-MCNC: 228 MG/DL (ref 65–140)
GLUCOSE SERPL-MCNC: 97 MG/DL (ref 65–140)

## 2021-06-12 PROCEDURE — 82948 REAGENT STRIP/BLOOD GLUCOSE: CPT

## 2021-06-12 RX ADMIN — ACETAMINOPHEN 975 MG: 325 TABLET ORAL at 05:05

## 2021-06-12 RX ADMIN — PREGABALIN 75 MG: 75 CAPSULE ORAL at 17:02

## 2021-06-12 RX ADMIN — APIXABAN 5 MG: 5 TABLET, FILM COATED ORAL at 17:02

## 2021-06-12 RX ADMIN — DICLOFENAC SODIUM 4 G: 10 GEL TOPICAL at 08:06

## 2021-06-12 RX ADMIN — ACETAMINOPHEN 975 MG: 325 TABLET ORAL at 14:45

## 2021-06-12 RX ADMIN — LORATADINE 10 MG: 10 TABLET ORAL at 21:44

## 2021-06-12 RX ADMIN — Medication 2000 UNITS: at 08:06

## 2021-06-12 RX ADMIN — DOCUSATE SODIUM 100 MG: 100 CAPSULE, LIQUID FILLED ORAL at 08:06

## 2021-06-12 RX ADMIN — METFORMIN HYDROCHLORIDE: 500 TABLET, FILM COATED ORAL at 08:06

## 2021-06-12 RX ADMIN — METFORMIN HYDROCHLORIDE: 500 TABLET, FILM COATED ORAL at 17:03

## 2021-06-12 RX ADMIN — EMPAGLIFLOZIN 25 MG: 25 TABLET, FILM COATED ORAL at 08:05

## 2021-06-12 RX ADMIN — LISINOPRIL 10 MG: 10 TABLET ORAL at 08:06

## 2021-06-12 RX ADMIN — DOCUSATE SODIUM 100 MG: 100 CAPSULE, LIQUID FILLED ORAL at 17:02

## 2021-06-12 RX ADMIN — ACETAMINOPHEN 975 MG: 325 TABLET ORAL at 21:44

## 2021-06-12 RX ADMIN — APIXABAN 5 MG: 5 TABLET, FILM COATED ORAL at 08:07

## 2021-06-12 RX ADMIN — INSULIN LISPRO 1 UNITS: 100 INJECTION, SOLUTION INTRAVENOUS; SUBCUTANEOUS at 08:07

## 2021-06-12 RX ADMIN — MAGNESIUM OXIDE TAB 400 MG (241.3 MG ELEMENTAL MG) 400 MG: 400 (241.3 MG) TAB at 08:06

## 2021-06-12 RX ADMIN — METHADONE HYDROCHLORIDE 70 MG: 10 TABLET ORAL at 08:06

## 2021-06-12 RX ADMIN — INSULIN LISPRO 1 UNITS: 100 INJECTION, SOLUTION INTRAVENOUS; SUBCUTANEOUS at 21:45

## 2021-06-12 RX ADMIN — PREGABALIN 75 MG: 75 CAPSULE ORAL at 08:06

## 2021-06-12 RX ADMIN — MAGNESIUM OXIDE TAB 400 MG (241.3 MG ELEMENTAL MG) 400 MG: 400 (241.3 MG) TAB at 17:03

## 2021-06-12 RX ADMIN — Medication 1 TABLET: at 08:07

## 2021-06-12 RX ADMIN — DICLOFENAC SODIUM 4 G: 10 GEL TOPICAL at 17:02

## 2021-06-12 RX ADMIN — PANTOPRAZOLE SODIUM 20 MG: 20 TABLET, DELAYED RELEASE ORAL at 08:07

## 2021-06-12 RX ADMIN — FLUTICASONE PROPIONATE 1 SPRAY: 50 SPRAY, METERED NASAL at 08:05

## 2021-06-12 RX ADMIN — PREGABALIN 75 MG: 75 CAPSULE ORAL at 21:44

## 2021-06-12 RX ADMIN — INSULIN LISPRO 2 UNITS: 100 INJECTION, SOLUTION INTRAVENOUS; SUBCUTANEOUS at 17:03

## 2021-06-12 RX ADMIN — INSULIN GLARGINE 17 UNITS: 100 INJECTION, SOLUTION SUBCUTANEOUS at 21:45

## 2021-06-12 RX ADMIN — DULOXETINE HYDROCHLORIDE 30 MG: 30 CAPSULE, DELAYED RELEASE ORAL at 21:44

## 2021-06-12 NOTE — ASSESSMENT & PLAN NOTE
Lab Results   Component Value Date    HGBA1C 7 7 (H) 05/29/2021       No results for input(s): POCGLU in the last 72 hours      Blood Sugar Average: Last 72 hrs:  ·

## 2021-06-12 NOTE — ASSESSMENT & PLAN NOTE
Lab Results   Component Value Date    HGBA1C 7 7 (H) 05/29/2021       No results for input(s): POCGLU in the last 72 hours      Blood Sugar Average: Last 72 hrs:  ·  continue insulin monitor blood sugars  · With neuropathy-continue Lyrica

## 2021-06-12 NOTE — NURSING NOTE
Pt awake resting in bed  SBT to [facility] supervision  Cont b&b overnight  Voices no complaints at this given time  Continuing to monitor and follow plan of care

## 2021-06-12 NOTE — CONSULTS
[address] [facility] 1960, 59 y o  male MRN: [id number]  Unit/Bed#: [facility] 222-01 Encounter: [id number]  Primary Care Provider: VERONICA Nunez   Date and time admitted to hospital: 6/10/2021 11:44 AM    Inpatient consult to Internal Medicine  Consult performed by: Chelle Randall PA-C  Consult ordered by: Priscilla Quintanilla PA-C          Essential hypertension  Assessment & Plan  · Continue lisinopril    Type 2 diabetes mellitus, with long-term current use of insulin [facility])  Assessment & Plan  Lab Results   Component Value Date    HGBA1C 7 7 (H) 05/29/2021       No results for input(s): POCGLU in the last 72 hours  Blood Sugar Average: Last 72 hrs:  ·  continue insulin monitor blood sugars  · With neuropathy-continue Lyrica    Right femoral vein DVT (HCC)  Assessment & Plan  · Previously diagnosed  · Patient is on Eliquis    Vitamin D deficiency  Assessment & Plan  · Continue p o  Replacement    Methadone maintenance therapy patient [facility])  Assessment & Plan  · Patient on chronic methadone 70 mg daily      Hyperlipidemia associated with type 2 diabetes mellitus ([facility] [address] )  Assessment & Plan  Lab Results   Component Value Date    HGBA1C 7 7 (H) 05/29/2021       No results for input(s): POCGLU in the last 72 hours  Blood Sugar Average: Last 72 hrs:  ·     * S/P AKA (above knee amputation), right [facility])  Assessment & Plan  · PT OT per rehab services      Recommendations for Discharge:  · Per Primary Service      History of Present Illness:  Sharif Barillas is a 59 y o  male who is admitted to the acute rehab unit for therapy services for prosthesis training  Patient has past medical history of diabetes mellitus type 2 on insulin, hypertension, hyperlipidemia, GERD, chronic methadone use, history of COVID-19 infection in January, ischemia of the right lower extremity status post right AKA was sent in for therapy training    We are consulted for hypertension and diabetes management  Review of Systems:  Review of Systems   Musculoskeletal: Positive for arthralgias (Left knee and ankle pain)  Ambulatory issues and here to learn to walk   All other systems reviewed and are negative  Past Medical and Surgical History:   Past Medical History:   Diagnosis Date    [facility]     GERD (gastroesophageal reflux disease)     Hypercholesteremia     Hypertension     Ischemia of right lower extremity with suspected rhabdomyolysis 2/6/2021    Left Hydropneumothorax 2/10/2021    Methadone use ([facility] [address] )     Pneumonia due to COVID-19 virus 1/11/2021       Past Surgical History:   Procedure Laterality Date    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    Procedure: AMPUTATION ABOVE KNEE (AKA); Surgeon: Lisa Curry MD;  [facility]: [facility];  Service: Vascular    AMPUTATION ABOVE KNEE (AKA) Right 1/18/2021    Procedure: AMPUTATION ABOVE KNEE (AKA) FORMALIZATION,  R AKA wound washout, wound closure;  Surgeon: Lisa Curry MD;  [facility];  Service: Vascular    ARTERIOGRAM Right 1/13/2021    Procedure: ARTERIOGRAM;  Surgeon: Alberto Guillory DO;  [facility];  Service: Vascular    IR CHEST TUBE PLACEMENT  2/10/2021    IR LOWER EXTREMITY ANGIOGRAM  1/14/2021    THROMBECTOMY W/ EMBOLECTOMY Right 1/13/2021    Procedure: EMBOLECTOMY/THROMBECTOMY LOWER EXTREMITY;  Surgeon: Alberto Guillory DO;  Location: [facility];  Service: Vascular       Meds/Allergies:  all medications and allergies reviewed    Allergies:    Allergies   Allergen Reactions    Varenicline        Social History:     Marital Status:     Substance Use History:   Social History     Substance and Sexual Activity   Alcohol Use Not Currently     Social History     Tobacco Use   Smoking Status Former Smoker   Smokeless Tobacco Never Used     Social History     Substance and Sexual Activity   Drug Use No    Comment: methadone       Family History:  I have reviewed the patients family history    Physical Exam:   Vitals:   Blood Pressure: 109/59 (06/11/21 1955)  Pulse: 75 (06/11/21 1955)  Temperature: (!) 97 1 °F (36 2 °C) (06/11/21 1955)  Temp Source: Temporal (06/11/21 1955)  Respirations: 18 (06/11/21 1955)  Height: 5' 8" (172 7 cm) (06/10/21 1211)  Weight - Scale: 70 6 kg (155 lb 10 3 oz) (06/10/21 1211)  SpO2: 96 % (06/11/21 1955)    Physical Exam  Vitals signs reviewed  Constitutional:       General: He is not in acute distress  Appearance: Normal appearance  Comments: Patient sitting bedside eating cookies   HENT:      Head: Normocephalic and atraumatic  Right Ear: External ear normal       Left Ear: External ear normal       Nose: Nose normal    Eyes:      General:         Right eye: No discharge  Left eye: No discharge  Conjunctiva/sclera: Conjunctivae normal    Neck:      Musculoskeletal: Normal range of motion  Cardiovascular:      Rate and Rhythm: Normal rate and regular rhythm  Heart sounds: Normal heart sounds  No murmur  Pulmonary:      Effort: Pulmonary effort is normal  No respiratory distress  Breath sounds: Normal breath sounds  No wheezing or rales  Abdominal:      General: Bowel sounds are normal  There is no distension  Palpations: Abdomen is soft  Tenderness: There is no abdominal tenderness  There is no guarding  Musculoskeletal: Normal range of motion  General: Deformity (Right AKA) present  Comments: Left lower extremity in vena dynes   Skin:     General: Skin is warm and dry  Neurological:      Mental Status: He is alert and oriented to person, place, and time  Mental status is at baseline  Psychiatric:         Mood and Affect: Mood normal          Behavior: Behavior normal          Thought Content: Thought content normal          Judgment: Judgment normal          Additional Data:   Lab Results: I have personally reviewed pertinent reports        Results from last 7 days   Lab Units 06/11/21  0535   WBC Thousand/uL 6 80   HEMOGLOBIN g/dL 12 8*   HEMATOCRIT % 38 1*   PLATELETS Thousands/uL 182   NEUTROS PCT % 54   LYMPHS PCT % 32   MONOS PCT % 9   EOS PCT % 4     Results from last 7 days   Lab Units 06/11/21  0535   SODIUM mmol/L 137   POTASSIUM mmol/L 4 4   CHLORIDE mmol/L 98   CO2 mmol/L 32*   BUN mg/dL 29*   CREATININE mg/dL 0 75   CALCIUM mg/dL 10 2   ALK PHOS U/L 86   ALT U/L 18   AST U/L 19             Lab Results   Component Value Date/Time    HGBA1C 7 7 (H) 05/29/2021 08:55 AM    HGBA1C 5 3 02/26/2021 06:30 AM    HGBA1C 11 6 (H) 12/09/2020 08:45 AM    HGBA1C 11 9 (A) 07/18/2020 08:58 AM    HGBA1C 12 9 (A) 06/18/2020 09:28 AM    HGBA1C 11 5 (H) 03/04/2020 07:19 AM       ** Please Note: This note has been constructed using a voice recognition system   **

## 2021-06-13 LAB
GLUCOSE SERPL-MCNC: 141 MG/DL (ref 65–140)
GLUCOSE SERPL-MCNC: 152 MG/DL (ref 65–140)
GLUCOSE SERPL-MCNC: 168 MG/DL (ref 65–140)

## 2021-06-13 PROCEDURE — 97116 GAIT TRAINING THERAPY: CPT

## 2021-06-13 PROCEDURE — 97530 THERAPEUTIC ACTIVITIES: CPT

## 2021-06-13 PROCEDURE — 97110 THERAPEUTIC EXERCISES: CPT

## 2021-06-13 PROCEDURE — 97535 SELF CARE MNGMENT TRAINING: CPT

## 2021-06-13 PROCEDURE — 82948 REAGENT STRIP/BLOOD GLUCOSE: CPT

## 2021-06-13 PROCEDURE — 97542 WHEELCHAIR MNGMENT TRAINING: CPT

## 2021-06-13 RX ADMIN — APIXABAN 5 MG: 5 TABLET, FILM COATED ORAL at 08:43

## 2021-06-13 RX ADMIN — ACETAMINOPHEN 975 MG: 325 TABLET ORAL at 21:29

## 2021-06-13 RX ADMIN — PREGABALIN 75 MG: 75 CAPSULE ORAL at 21:29

## 2021-06-13 RX ADMIN — ACETAMINOPHEN 975 MG: 325 TABLET ORAL at 14:26

## 2021-06-13 RX ADMIN — MAGNESIUM OXIDE TAB 400 MG (241.3 MG ELEMENTAL MG) 400 MG: 400 (241.3 MG) TAB at 08:43

## 2021-06-13 RX ADMIN — METHADONE HYDROCHLORIDE 70 MG: 10 TABLET ORAL at 08:44

## 2021-06-13 RX ADMIN — METFORMIN HYDROCHLORIDE: 500 TABLET, FILM COATED ORAL at 17:08

## 2021-06-13 RX ADMIN — DULOXETINE HYDROCHLORIDE 30 MG: 30 CAPSULE, DELAYED RELEASE ORAL at 21:29

## 2021-06-13 RX ADMIN — APIXABAN 5 MG: 5 TABLET, FILM COATED ORAL at 17:08

## 2021-06-13 RX ADMIN — ACETAMINOPHEN 975 MG: 325 TABLET ORAL at 05:06

## 2021-06-13 RX ADMIN — PANTOPRAZOLE SODIUM 20 MG: 20 TABLET, DELAYED RELEASE ORAL at 08:43

## 2021-06-13 RX ADMIN — DOCUSATE SODIUM 100 MG: 100 CAPSULE, LIQUID FILLED ORAL at 08:43

## 2021-06-13 RX ADMIN — INSULIN LISPRO 1 UNITS: 100 INJECTION, SOLUTION INTRAVENOUS; SUBCUTANEOUS at 13:08

## 2021-06-13 RX ADMIN — DOCUSATE SODIUM 100 MG: 100 CAPSULE, LIQUID FILLED ORAL at 17:08

## 2021-06-13 RX ADMIN — PREGABALIN 75 MG: 75 CAPSULE ORAL at 08:43

## 2021-06-13 RX ADMIN — METFORMIN HYDROCHLORIDE: 500 TABLET, FILM COATED ORAL at 08:43

## 2021-06-13 RX ADMIN — LORATADINE 10 MG: 10 TABLET ORAL at 21:29

## 2021-06-13 RX ADMIN — EMPAGLIFLOZIN 25 MG: 25 TABLET, FILM COATED ORAL at 08:44

## 2021-06-13 RX ADMIN — DICLOFENAC SODIUM 4 G: 10 GEL TOPICAL at 21:46

## 2021-06-13 RX ADMIN — INSULIN LISPRO 1 UNITS: 100 INJECTION, SOLUTION INTRAVENOUS; SUBCUTANEOUS at 08:45

## 2021-06-13 RX ADMIN — LISINOPRIL 10 MG: 10 TABLET ORAL at 08:43

## 2021-06-13 RX ADMIN — Medication 1 TABLET: at 08:43

## 2021-06-13 RX ADMIN — Medication 2000 UNITS: at 08:43

## 2021-06-13 RX ADMIN — INSULIN LISPRO 1 UNITS: 100 INJECTION, SOLUTION INTRAVENOUS; SUBCUTANEOUS at 21:29

## 2021-06-13 RX ADMIN — MAGNESIUM OXIDE TAB 400 MG (241.3 MG ELEMENTAL MG) 400 MG: 400 (241.3 MG) TAB at 17:08

## 2021-06-13 RX ADMIN — INSULIN GLARGINE 17 UNITS: 100 INJECTION, SOLUTION SUBCUTANEOUS at 21:30

## 2021-06-13 RX ADMIN — FLUTICASONE PROPIONATE 1 SPRAY: 50 SPRAY, METERED NASAL at 08:44

## 2021-06-13 RX ADMIN — PREGABALIN 75 MG: 75 CAPSULE ORAL at 17:08

## 2021-06-13 NOTE — PROGRESS NOTES
06/13/21 0930   Pain Assessment   Pain Assessment Tool 0-10   Pain Score 7   Pain Location/Orientation Orientation: Right;Location: Leg   Patient's Stated Pain Goal No pain   Restrictions/Precautions   Precautions Fall Risk;Limb alert;Pain   Weight Bearing Restrictions No   ROM Restrictions No   Braces or Orthoses Prosthesis  (R LE)   Cognition   Overall Cognitive Status WFL   Arousal/Participation Alert; Responsive; Cooperative   Attention Within functional limits   Orientation Level Oriented X4   Memory Within functional limits   Following Commands Follows all commands and directions without difficulty   Roll Left and Right   Type of Assistance Needed Independent   Comment bedrails   Roll Left and Right CARE Score 6   Sit to Lying   Type of Assistance Needed Independent   Comment bed rails   Sit to Lying CARE Score 6   Lying to Sitting on Side of Bed   Type of Assistance Needed Independent   Comment bed rails   Lying to Sitting on Side of Bed CARE Score 6   Sit to Stand   Type of Assistance Needed Physical assistance   Physical Assistance Level 25% or less   Comment min A iwht R prosthesis intact   Sit to Stand CARE Score 3   Bed-Chair Transfer   Type of Assistance Needed Physical assistance   Physical Assistance Level 25% or less   Comment spt iwht prosthesis intact   Chair/Bed-to-Chair Transfer CARE Score 3   Transfer Bed/Chair/Wheelchair   Limitations Noted In Balance;Confidence; Coordination; Endurance;Pain Management;Problem Solving;UE Strength;LE Strength   Adaptive Equipment Roller Walker; Other  (prosthesis)   Stand Pivot Minimal Assist   Sit to Stand Minimal Assist   Stand to Sit Minimal Assist   Supine to Sit Independent   Sit to Supine Independent   Car Transfer   Reason if not Attempted Safety concerns   Car Transfer CARE Score 88   Walk 10 Feet   Type of Assistance Needed Physical assistance   Physical Assistance Level 25% or less   Comment min A   Walk 10 Feet CARE Score 3   Walk 50 Feet with Two Turns Type of Assistance Needed Physical assistance   Physical Assistance Level 25% or less   Comment min A   Walk 50 Feet with Two Turns CARE Score 3   Walk 150 Feet   Reason if not Attempted Safety concerns   Walk 150 Feet CARE Score 88   Walking 10 Feet on Uneven Surfaces   Reason if not Attempted Safety concerns   Walking 10 Feet on Uneven Surfaces CARE Score 88   Ambulation   Primary Mode of Locomotion Prior to Admission Walk   Distance Walked (feet) 72 ft  (24)   Assist Device NOMAD GOODS  (w/prosthsis)   Gait Pattern Antalgic; Inconsistant Cecelia; Slow Cecelia;Decreased foot clearance; Step to;Narrow DEACON   Limitations Noted In Balance;Device Management; Coordination; Heel Strike; Safety;Strength   Provided Assistance with: Balance   Walk Assist Level Minimum Assist   Wheel 50 Feet with Two Turns   Type of Assistance Needed Supervision   Physical Assistance Level No physical assistance   Wheel 50 Feet with Two Turns CARE Score 4   Wheel 150 Feet   Type of Assistance Needed Supervision   Physical Assistance Level No physical assistance   Wheel 150 Feet CARE Score 4   Wheelchair mobility   Type of Wheelchair Used 1  Manual   Method Right upper extremity; Left upper extremity   Assistance Provided For Remove Leg Rest;Replace Leg Rest   Distance Level Surface (feet) 167 ft   Distance Wheeled 3% Grade 20 ft   Findings S /mod I   Curb or Single Stair   Reason if not Attempted Activity not applicable   1 Step (Curb) CARE Score 9   4 Steps   Reason if not Attempted Activity not applicable   4 Steps CARE Score 9   12 Steps   Reason if not Attempted Activity not applicable   12 Steps CARE Score 9   Therapeutic Interventions   Strengthening seated "TE; supine TE all done 30 reps; also performed static standing x 3 min wiht prosthesis intact; Assessment   Treatment Assessment pt was able to camille and doff his prosthesisi with verbal cuing in sitting    he transferred wiht prosthesis using RW for spt with CGA/min A; he was able to walk 72 ft iwht RW and Nora; pt was able to walk several times more but than began to get sore in thigh  he propelled himself in w/c over 150ft with S/I   he was able to do exerices for R hip in supine and sidelying today to strengthen hip for gait /transfers  pt is making progress toward PT goals   Problem List Decreased strength;Decreased range of motion;Decreased endurance; Impaired balance;Decreased mobility; Impaired judgement;Decreased safety awareness;Pain   PT Barriers   Physical Impairment Decreased strength;Decreased range of motion;Decreased endurance; Impaired balance;Decreased mobility; Decreased coordination; Impaired judgement;Decreased safety awareness;Pain   Functional Limitation Wheelchair management; [facility] negotiation;Car transfers; Ramp negotiation   Plan   Treatment/Interventions Functional transfer training;LE strengthening/ROM; Elevations; Therapeutic exercise; Endurance training;Patient/family training;Gait training   Progress Progressing toward goals   PT Therapy Minutes   PT Time In 0930   PT Time Out 1030   PT Total Time (minutes) 60   PT Mode of treatment - Individual (minutes) 60

## 2021-06-13 NOTE — PROGRESS NOTES
06/13/21 1315   Pain Assessment   Pain Assessment Tool 0-10   Pain Score 7   Pain Location/Orientation Orientation: Right;Location: Leg  (with  in place)   Restrictions/Precautions   Precautions Fall Risk;Limb alert;Pain   Weight Bearing Restrictions No   ROM Restrictions No   Braces or Orthoses   (prosthesis use deferred due to increased use this am)   Eating   Type of Assistance Needed Set-up / clean-up   Eating CARE Score 5   Eating Assessment   Food To Mouth Yes   Meal Assessed Lunch   Opens Packages No   Findings assist for opening due to decreased R hand coordination   Toileting Hygiene   Type of Assistance Needed Physical assistance   Physical Assistance Level 26%-50%   Comment clothing management while getting onto [facility] CARE Score 3   Toilet Transfer   Type of Assistance Needed Incidental touching   Comment CGA after board placed for transfer to [facility]   Toilet Transfer CARE Score 4   Toilet Transfer   Surface Assessed Drop Arm Commode   Transfer Technique Slide Board   Limitations Noted In Balance; Endurance; Safety;LE Strength;Problem Solving   Exercise Tools   UE Ergometer 5 minutes forward and backwards BUE no resistance   Hand Gripper gripper with pegs B hands   Other Exercise Tool 1 fxl reacher use R hand for retrieval of pegs from the floor   Other Exercise Tool 2 knots out of tubing B hands to promote increased R hand coordination   Additional Activities   Additional Activities Other (Comment)   Additional Activities Comments fxl mobility with w/c in hallway Mod I BUE and L foot   Assessment   Treatment Assessment Pt present sitting in the w/c finishing with lunch and agreeable to OT session  Pt tolerates session without complaints completing BUE therex without difficulty  Pt is making gains towards goals and will benefit from continued skilled OT servces to increase independence with daily tasks     Problem List Decreased strength;Decreased range of motion;Decreased endurance; Impaired balance;Decreased coordination;Decreased safety awareness;Pain   Plan   Treatment/Interventions ADL retraining;Functional transfer training; Therapeutic exercise; Endurance training;Patient/family training;Equipment eval/education; Compensatory technique education   Progress Progressing toward goals   OT Therapy Minutes   OT Time In 1315   OT Time Out 1415   OT Total Time (minutes) 60   OT Mode of treatment - Individual (minutes) 60   Therapy Time missed   Time missed?  No

## 2021-06-13 NOTE — PROGRESS NOTES
06/13/21 0830   Pain Assessment   Pain Assessment Tool 0-10   Pain Score 8   Pain Location/Orientation Orientation: Right;Location: Leg   Restrictions/Precautions   Precautions Fall Risk;Limb alert;Pain   Weight Bearing Restrictions No   ROM Restrictions No   Braces or Orthoses Prosthesis  (RLE)   Eating   Type of Assistance Needed Set-up / clean-up   Eating CARE Score 5   Eating Assessment   Food To Mouth Yes   Able To Cut Yes   Meal Assessed Breakfast   Opens Packages No   Oral Hygiene   Type of Assistance Needed Set-up / clean-up   Oral Hygiene CARE Score 5   Grooming   Able To Initiate Tasks;Comb/Brush Hair;Wash/Dry Face;Brush/Clean Teeth;Wash/Dry Hands   Findings Pt rquests a shower cap to wash hair with patient able to rub into hair and then dry   Shower/Bathe Self   Type of Assistance Needed Supervision   Comment sitting EOb and shifting side to side for pericare   Shower/Bathe Self CARE Score 4   Bathing   Assessed Bath Style Sponge Bath   Anticipated D/C Bath Style Sponge Bath   Able to Gather/Transport No   Able to [employer] Temperature No   Able to Wash/Rinse/Dry (body part) Left Arm;Right Arm;L Upper Leg;R Upper Leg;L Lower Leg/Foot;Chest;Abdomen;Perineal Area; Buttocks   Limitations Noted in Balance; Endurance;ROM;Safety;Strength  (new RLE amp)   Positioning Seated;Supine   Upper Body Dressing   Type of Assistance Needed Set-up / clean-up   Upper Body Dressing CARE Score 5   Lower Body Dressing   Type of Assistance Needed Supervision   Comment shifting side to side for clothing management over buttocks   Lower Body Dressing CARE Score 4   Putting On/Taking Off Footwear   Type of Assistance Needed Physical assistance   Physical Assistance Level 26%-50%   Comment sitting EOB, min A with prosthesis   Putting On/Taking Off Footwear CARE Score 3   Dressing/Undressing Clothing   Remove UB Clothes Pullover Shirt   Don UB Clothes Pullover Shirt   Amount of Physical Assistance Provided No physical assistance Remove LB Clothes Pants; Undergarment;Socks   Don LB Clothes Pants; Undergarment;Socks; Shoes   Amount of Physical Assistance Provided No physical assistance   Limitations Noted In Balance; Endurance;Problem Solving; Safety;Strength;ROM  (new RLE amp)   Positioning In Bed;Sit Edge Of Bed   Findings RLE sleeve and prosthesis applied while sitting on EOB with supervision and cues for donning sleeve and min A for donning prosthesis due to decreased hand strength and novelty of use   Sit to Lying   Type of Assistance Needed Independent   Sit to Lying CARE Score 6   Lying to Sitting on Side of Bed   Type of Assistance Needed Independent   Lying to Sitting on Side of Bed CARE Score 6   Additional Activities   Additional Activities Other (Comment)   Additional Activities Comments patient able ot sit EOB thoughout session demostrating good sitting balance for ADL completion   Assessment   Treatment Assessment Pt participates in ADLs while sitting EOb leaning side to side for hygiene and clothing management  Pt tolerates session without complaints and demostrates increased ease with donning and doffing prosthesis  Pt requires assist due to decreased BUE strength/ coordination (LUE more difficult than R), decreased balance, safety, endurance and recent RLE AKA  Pt will benefit from continued skilled OT services to increase independence with daily tasks  Problem List Decreased strength;Decreased range of motion;Decreased endurance; Impaired balance;Decreased safety awareness;Pain  (new RLE amp)   Plan   Treatment/Interventions ADL retraining;Functional transfer training; Therapeutic exercise; Endurance training;Patient/family training;Equipment eval/education; Compensatory technique education   Progress Progressing toward goals   OT Therapy Minutes   OT Time In 0830   OT Time Out 0930   OT Total Time (minutes) 60   OT Mode of treatment - Individual (minutes) 60   Therapy Time missed   Time missed?  No

## 2021-06-14 LAB
GLUCOSE SERPL-MCNC: 119 MG/DL (ref 65–140)
GLUCOSE SERPL-MCNC: 146 MG/DL (ref 65–140)
GLUCOSE SERPL-MCNC: 154 MG/DL (ref 65–140)
GLUCOSE SERPL-MCNC: 155 MG/DL (ref 65–140)

## 2021-06-14 PROCEDURE — 97535 SELF CARE MNGMENT TRAINING: CPT

## 2021-06-14 PROCEDURE — 97116 GAIT TRAINING THERAPY: CPT

## 2021-06-14 PROCEDURE — 99231 SBSQ HOSP IP/OBS SF/LOW 25: CPT | Performed by: PHYSICAL MEDICINE & REHABILITATION

## 2021-06-14 PROCEDURE — 97110 THERAPEUTIC EXERCISES: CPT

## 2021-06-14 PROCEDURE — 97530 THERAPEUTIC ACTIVITIES: CPT

## 2021-06-14 PROCEDURE — 82948 REAGENT STRIP/BLOOD GLUCOSE: CPT

## 2021-06-14 PROCEDURE — 97542 WHEELCHAIR MNGMENT TRAINING: CPT

## 2021-06-14 RX ORDER — LIDOCAINE 40 MG/G
CREAM TOPICAL 4 TIMES DAILY PRN
Status: DISCONTINUED | OUTPATIENT
Start: 2021-06-14 | End: 2021-06-25 | Stop reason: HOSPADM

## 2021-06-14 RX ADMIN — ACETAMINOPHEN 975 MG: 325 TABLET ORAL at 13:27

## 2021-06-14 RX ADMIN — APIXABAN 5 MG: 5 TABLET, FILM COATED ORAL at 08:15

## 2021-06-14 RX ADMIN — PREGABALIN 75 MG: 75 CAPSULE ORAL at 08:16

## 2021-06-14 RX ADMIN — APIXABAN 5 MG: 5 TABLET, FILM COATED ORAL at 17:41

## 2021-06-14 RX ADMIN — PREGABALIN 75 MG: 75 CAPSULE ORAL at 21:15

## 2021-06-14 RX ADMIN — DULOXETINE HYDROCHLORIDE 30 MG: 30 CAPSULE, DELAYED RELEASE ORAL at 21:14

## 2021-06-14 RX ADMIN — DOCUSATE SODIUM 100 MG: 100 CAPSULE, LIQUID FILLED ORAL at 17:41

## 2021-06-14 RX ADMIN — Medication 2000 UNITS: at 08:15

## 2021-06-14 RX ADMIN — METFORMIN HYDROCHLORIDE: 500 TABLET, FILM COATED ORAL at 08:15

## 2021-06-14 RX ADMIN — EMPAGLIFLOZIN 25 MG: 25 TABLET, FILM COATED ORAL at 08:20

## 2021-06-14 RX ADMIN — INSULIN LISPRO 1 UNITS: 100 INJECTION, SOLUTION INTRAVENOUS; SUBCUTANEOUS at 13:27

## 2021-06-14 RX ADMIN — MAGNESIUM OXIDE TAB 400 MG (241.3 MG ELEMENTAL MG) 400 MG: 400 (241.3 MG) TAB at 17:41

## 2021-06-14 RX ADMIN — LORATADINE 10 MG: 10 TABLET ORAL at 21:14

## 2021-06-14 RX ADMIN — METFORMIN HYDROCHLORIDE: 500 TABLET, FILM COATED ORAL at 17:41

## 2021-06-14 RX ADMIN — INSULIN LISPRO 1 UNITS: 100 INJECTION, SOLUTION INTRAVENOUS; SUBCUTANEOUS at 17:42

## 2021-06-14 RX ADMIN — INSULIN GLARGINE 17 UNITS: 100 INJECTION, SOLUTION SUBCUTANEOUS at 21:15

## 2021-06-14 RX ADMIN — METHADONE HYDROCHLORIDE 70 MG: 10 TABLET ORAL at 08:16

## 2021-06-14 RX ADMIN — Medication 1 TABLET: at 08:16

## 2021-06-14 RX ADMIN — PANTOPRAZOLE SODIUM 20 MG: 20 TABLET, DELAYED RELEASE ORAL at 08:15

## 2021-06-14 RX ADMIN — ACETAMINOPHEN 975 MG: 325 TABLET ORAL at 06:07

## 2021-06-14 RX ADMIN — MAGNESIUM OXIDE TAB 400 MG (241.3 MG ELEMENTAL MG) 400 MG: 400 (241.3 MG) TAB at 08:15

## 2021-06-14 RX ADMIN — ACETAMINOPHEN 975 MG: 325 TABLET ORAL at 21:14

## 2021-06-14 RX ADMIN — PREGABALIN 75 MG: 75 CAPSULE ORAL at 17:41

## 2021-06-14 RX ADMIN — DOCUSATE SODIUM 100 MG: 100 CAPSULE, LIQUID FILLED ORAL at 08:15

## 2021-06-14 RX ADMIN — DICLOFENAC SODIUM 4 G: 10 GEL TOPICAL at 17:45

## 2021-06-14 RX ADMIN — LISINOPRIL 10 MG: 10 TABLET ORAL at 08:15

## 2021-06-14 RX ADMIN — FLUTICASONE PROPIONATE 1 SPRAY: 50 SPRAY, METERED NASAL at 08:20

## 2021-06-14 NOTE — PROGRESS NOTES
06/13/21 1930   Charting Type   Charting Type Shift assessment   Neurological   Neuro (WDL) X   Level of Consciousness Alert/awake   Orientation Level Oriented X4   Cognition Appropriate judgement; Appropriate safety awareness; Appropriate attention/concentration; Appropriate for developmental age; Follows commands   Speech Clear; Appropriate for developmental age   [de-identified] Able to swallow solids and liquids without difficulty   RUE Sensation Other (Comment);Numbness  (fingers)   RUE Muscle Strength 5- Normal strength   LUE Sensation Full sensation   LUE Muscle Strength 5- Normal strength   RLE Sensation Pain; Other (Comment)  (phantom pain)   RLE Muscle Strength 4- Movement against gravity and limited resistance   LLE Sensation Numbness   LLE Muscle Strength 4- Movement against gravity and limited resistance   Neuro Symptoms None   Neuro Additional Assessments No   Respiratory   Respiratory (WDL) WDL   Cardiac   Cardiac (WDL) WDL   Pain Assessment   Pain Assessment Tool 0-10   Pain Score 5   Peripheral Vascular   Peripheral Vascular (WDL) X   Cyanosis None   Capillary Refill Sluggish   Integumentary   Integumentary (WDL) X   Skin Color Appropriate for ethnicity   Skin Condition/Temp Warm;Dry   Skin Integrity Bruising   Skin Location BLEs, scattered   Skin Turgor Tenting   Integumentary Additional Assessments No   Trey Scale   Sensory Perceptions 3   Moisture 4   Activity 3   Mobility 3   Nutrition 3   Friction and Shear 2   Trey Scale Score 18   Wound 01/12/21 Pressure Injury Buttocks Mid;Right   Date First Assessed/Time First Assessed: 01/12/21 0300   Pre-Existing Wound: Yes  Primary Wound Type: Pressure Injury  Location: (c) Buttocks  Wound Location Orientation: Mid;Right   Dressing Foam, Silicon (eg   Allevyn, etc)   Musculoskeletal   Musculoskeletal (WDL) X   RLE Limited range of motion   Gastrointestinal   Gastrointestinal (WDL) WDL   Bowel Shift Assessment   Assistance Needed Stool softener   Bowel Incontinence No   Stool Assessment   Bowel Incontinence No   Bladder Shift Assessment   Bladder Device Urinal   Bladder Incontinence No   Bladder Management Minimal assistance   Bladder Management Deficit Emptied by staff

## 2021-06-14 NOTE — PROGRESS NOTES
06/14/21 0702   Pain Assessment   Pain Assessment Tool 0-10   Pain Score 6   Pain Location/Orientation Orientation: Right;Location: Leg   Restrictions/Precautions   Precautions Fall Risk;Limb alert   Eating   Type of Assistance Needed Adaptive equipment   Physical Assistance Level No physical assistance   Comment Built up handles for utensil, Pt attatched   Eating CARE Score 6   Oral Hygiene   Comment pt declined, stated he would preform after breakfast   Grooming   Able To Wash/Dry Face;Wash/Dry Hands   Shower/Bathe Self   Type of Assistance Needed Set-up / Juanita Smoke; Adaptive equipment   Physical Assistance Level No physical assistance   Comment WC level, basin bath at sink side, long handled sponge for LE   Shower/Bathe Self CARE Score 5   Bathing   Assessed Bath Style Sponge Bath   Anticipated D/C Bath Style Sponge Bath   Able to [employer] Temperature Yes   Able to Wash/Rinse/Dry (body part) Left Arm;L Upper Leg;Right Arm;R Upper Leg;L Lower Leg/Foot;R Lower Leg/Foot;Chest;Abdomen;Perineal Area; Buttocks   Positioning Seated   Adaptive Equipment Longhand Sponge   Upper Body Dressing   Type of Assistance Needed Set-up / clean-up   Upper Body Dressing CARE Score 5   Lower Body Dressing   Type of Assistance Needed Physical assistance   Physical Assistance Level 26%-50%   Comment Min A, Put up pants able to support self on arms to aide in dressing, Pt reports he has tried weight shifting to don but has little sucess   Lower Body Dressing CARE Score 3   Putting On/Taking Off Footwear   Type of Assistance Needed Set-up / Juanita Smoke; Adaptive equipment   Physical Assistance Level No physical assistance   Comment Pt Ed   to sock aides to help with compression and sock, Pt demonstrates good comprehension through demonstration   Putting On/Taking Off Footwear CARE Score 5   Picking Up Object   Reason if not Attempted Safety concerns   Picking Up Object CARE Score 88   Dressing/Undressing Clothing   Remove UB Clothes Pullover Shirt   Don UB Clothes Pullover Shirt   Amount of Physical Assistance Provided No physical assistance   Remove LB Clothes Shorts;Socks; Shoes   Don LB [employer]; Shoes   Amount of Physical Assistance Provided 25%-49%   Limitations Noted In Coordination; Sequencing   Adaptive Equipment Sock Aide   Positioning Supported Sit   Lying to Sitting on Side of Bed   Type of Assistance Needed Supervision   Physical Assistance Level No physical assistance   Lying to Sitting on Side of Bed CARE Score 4   Sit to Stand   Type of Assistance Needed Physical assistance   Physical Assistance Level 25% or less   Comment CG with RW and Prosthesis, after 2 steps manual assist to sit EOB do to LOB   Sit to Stand CARE Score 3   Bed-Chair Transfer   Type of Assistance Needed Supervision   Physical Assistance Level No physical assistance   Comment Pt uses slide board to transfer from bed to chair performed all setup and transfers self   Chair/Bed-to-Chair Transfer CARE Score 4   Transfer Bed/Chair/Wheelchair   Positioning Concerns Skin Integrity   Adaptive Equipment Transfer Board   Toilet Transfer   Type of Assistance Needed Incidental touching   Physical Assistance Level 25% or less   Comment CG, Sit pivot WC to Commode over toilet   Toilet Transfer CARE Score 3   Toilet Transfer   Surface Assessed Drop Arm Commode   Transfer Technique Sit Pivot   Limitations Noted In Endurance;Balance; Safety   Adaptive Equipment Grab Bar   Positioning Concerns Safety;Grab Bars;Raised Seat   Findings CG   Cognition   Overall Cognitive Status WFL   Orientation Level Oriented X4   Additional Activities   Additional Activities Comments WC mobility in room    Activity Tolerance   Activity Tolerance Patient limited by fatigue   Assessment   Treatment Assessment Pt performed ADLs sitting  Pt Ed to the importance of checking skin daily to ensure skin integrity   Pt made two attempts to don prothesis sleeve on own 1st attempt done in [facility] 2nd attempt performed at [facility], Therapist helped with 3rd donning Ed  Pt to strategies to help aide in donning  After standing Pt had an episode of LOB and for safety trasfer to the bathroom was preformed at [facility] level  Ed Pt to [facility] strategies and recognizing safety as being highest priority  Pt demonstrated good use of adpative equipment when eating breakfast using built up handles  Pt participated in 60 minutes of concurrent therapy to work on similar goals with a Pt with similar deficits  Pt is progressing well  It is recommended he continue skilled OT intervention to continue progress to LTG and increased functional independence  Prognosis Good   Problem List Decreased strength;Decreased endurance; Impaired balance;Decreased safety awareness;Decreased skin integrity   Plan   Treatment/Interventions ADL retraining;Functional transfer training;OT;Compensatory technique education   Progress Progressing toward goals   OT Therapy Minutes   OT Time In 0702   OT Time Out 0838   OT Total Time (minutes) 96   OT Mode of treatment - Individual (minutes) 36   OT Mode of treatment - Concurrent (minutes) 60   OT Mode of treatment - Group (minutes) 0   OT Mode of treatment - Co-treat (minutes) 0   OT Mode of Treatment - Total time(minutes) 96 minutes   OT Cumulative Minutes 141   Therapy Time missed   Time missed?  No

## 2021-06-14 NOTE — PLAN OF CARE
Problem: Potential for Falls  Goal: Patient will remain free of falls  Description: INTERVENTIONS:  - Assess patient frequently for physical needs  -  Identify cognitive and physical deficits and behaviors that affect risk of falls    -  [place] fall precautions as indicated by assessment   - Educate patient/family on patient safety including physical limitations  - Instruct patient to call for assistance with activity based on assessment  - Modify environment to reduce risk of injury  - Consider OT/PT consult to assist with strengthening/mobility  Outcome: Progressing     Problem: Prexisting or High Potential for Compromised Skin Integrity  Goal: Skin integrity is maintained or improved  Description: INTERVENTIONS:  - Identify patients at risk for skin breakdown  - Assess and monitor skin integrity  - Assess and monitor nutrition and hydration status  - Monitor labs   - Assess for incontinence   - Turn and reposition patient  - Assist with mobility/ambulation  - Relieve pressure over bony prominences  - Avoid friction and shearing  - Provide appropriate hygiene as needed including keeping skin clean and dry  - Evaluate need for skin moisturizer/barrier cream  - Collaborate with interdisciplinary team   - Patient/family teaching  - Consider wound care consult   Outcome: Progressing     Problem: PAIN - ADULT  Goal: Verbalizes/displays adequate comfort level or baseline comfort level  Description: Interventions:  - Encourage patient to monitor pain and request assistance  - Assess pain using appropriate pain scale  - Administer analgesics based on type and severity of pain and evaluate response  - Implement non-pharmacological measures as appropriate and evaluate response  - Consider cultural and social influences on pain and pain management  - Notify physician/advanced practitioner if interventions unsuccessful or patient reports new pain  Outcome: Progressing     Problem: SAFETY ADULT  Goal: Patient will remain free of falls  Description: INTERVENTIONS:  - Assess patient frequently for physical needs  -  Identify cognitive and physical deficits and behaviors that affect risk of falls    -  [place] fall precautions as indicated by assessment   - Educate patient/family on patient safety including physical limitations  - Instruct patient to call for assistance with activity based on assessment  - Modify environment to reduce risk of injury  - Consider OT/PT consult to assist with strengthening/mobility  Outcome: Progressing  Goal: Maintain or return to baseline ADL function  Description: INTERVENTIONS:  -  Assess patient's ability to carry out ADLs; assess patient's baseline for ADL function and identify physical deficits which impact ability to perform ADLs (bathing, care of mouth/teeth, toileting, grooming, dressing, etc )  - Assess/evaluate cause of self-care deficits   - Assess range of motion  - Assess patient's mobility; develop plan if impaired  - Assess patient's need for assistive devices and provide as appropriate  - Encourage maximum independence but intervene and supervise when necessary  - Involve family in performance of ADLs  - Assess for home care needs following discharge   - Consider OT consult to assist with ADL evaluation and planning for discharge  - Provide patient education as appropriate  Outcome: Progressing  Goal: Maintain or return mobility status to optimal level  Description: INTERVENTIONS:  - Assess patient's baseline mobility status (ambulation, transfers, stairs, etc )    - Identify cognitive and physical deficits and behaviors that affect mobility  - Identify mobility aids required to assist with transfers and/or ambulation (gait belt, sit-to-stand, lift, walker, cane, etc )  - [place] fall precautions as indicated by assessment  - Record patient progress and toleration of activity level on Mobility SBAR; progress patient to next Phase/Stage  - Instruct patient to call for assistance with activity based on assessment  - Consider rehabilitation consult to assist with strengthening/weightbearing, etc   Outcome: Progressing     Problem: DISCHARGE PLANNING  Goal: Discharge to home or other facility with appropriate resources  Description: INTERVENTIONS:  - Identify barriers to discharge w/patient and caregiver  - Arrange for needed discharge resources and transportation as appropriate  - Identify discharge learning needs (meds, wound care, etc )  - Arrange for interpretive services to assist at discharge as needed  - Refer to Case Management Department for coordinating discharge planning if the patient needs post-hospital services based on physician/advanced practitioner order or complex needs related to functional status, cognitive ability, or social support system  Outcome: Progressing     Problem: SAFETY ADULT  Goal: Patient will remain free of falls  Description: INTERVENTIONS:  - Assess patient frequently for physical needs  -  Identify cognitive and physical deficits and behaviors that affect risk of falls    -  [place] fall precautions as indicated by assessment   - Educate patient/family on patient safety including physical limitations  - Instruct patient to call for assistance with activity based on assessment  - Modify environment to reduce risk of injury  - Consider OT/PT consult to assist with strengthening/mobility  Outcome: Progressing  Goal: Maintain or return to baseline ADL function  Description: INTERVENTIONS:  -  Assess patient's ability to carry out ADLs; assess patient's baseline for ADL function and identify physical deficits which impact ability to perform ADLs (bathing, care of mouth/teeth, toileting, grooming, dressing, etc )  - Assess/evaluate cause of self-care deficits   - Assess range of motion  - Assess patient's mobility; develop plan if impaired  - Assess patient's need for assistive devices and provide as appropriate  - Encourage maximum independence but intervene and supervise when necessary  - Involve family in performance of ADLs  - Assess for home care needs following discharge   - Consider OT consult to assist with ADL evaluation and planning for discharge  - Provide patient education as appropriate  Outcome: Progressing  Goal: Maintain or return mobility status to optimal level  Description: INTERVENTIONS:  - Assess patient's baseline mobility status (ambulation, transfers, stairs, etc )    - Identify cognitive and physical deficits and behaviors that affect mobility  - Identify mobility aids required to assist with transfers and/or ambulation (gait belt, sit-to-stand, lift, walker, cane, etc )  - [place] fall precautions as indicated by assessment  - Record patient progress and toleration of activity level on Mobility SBAR; progress patient to next Phase/Stage  - Instruct patient to call for assistance with activity based on assessment  - Consider rehabilitation consult to assist with strengthening/weightbearing, etc   Outcome: Progressing     Problem: Knowledge Deficit  Goal: Patient/family/caregiver demonstrates understanding of disease process, treatment plan, medications, and discharge instructions  Description: Complete learning assessment and assess knowledge base    Interventions:  - Provide teaching at level of understanding  - Provide teaching via preferred learning methods  Outcome: Progressing     Problem: SKIN/TISSUE INTEGRITY - ADULT  Goal: Skin integrity remains intact  Description: INTERVENTIONS  - Identify patients at risk for skin breakdown  - Assess and monitor skin integrity  - Assess and monitor nutrition and hydration status  - Monitor labs (i e  albumin)  - Assess for incontinence   - Turn and reposition patient  - Assist with mobility/ambulation  - Relieve pressure over bony prominences  - Avoid friction and shearing  - Provide appropriate hygiene as needed including keeping skin clean and dry  - Evaluate need for skin moisturizer/barrier cream  - Collaborate with interdisciplinary team (i e  Nutrition, Rehabilitation, etc )   - Patient/family teaching  Outcome: Progressing  Goal: Incision(s), wounds(s) or drain site(s) healing without S/S of infection  Description: INTERVENTIONS  - Assess and document risk factors for skin impairment   - Assess and document dressing, incision, wound bed, drain sites and surrounding tissue  - Consider nutrition services referral as needed  - Oral mucous membranes remain intact  - Provide patient/ family education  Outcome: Progressing     Problem: MUSCULOSKELETAL - ADULT  Goal: Maintain or return mobility to safest level of function  Description: INTERVENTIONS:  - Assess patient's ability to carry out ADLs; assess patient's baseline for ADL function and identify physical deficits which impact ability to perform ADLs (bathing, care of mouth/teeth, toileting, grooming, dressing, etc )  - Assess/evaluate cause of self-care deficits   - Assess range of motion  - Assess patient's mobility  - Assess patient's need for assistive devices and provide as appropriate  - Encourage maximum independence but intervene and supervise when necessary  - Involve family in performance of ADLs  - Assess for home care needs following discharge   - Consider OT consult to assist with ADL evaluation and planning for discharge  - Provide patient education as appropriate  Outcome: Progressing  Goal: Maintain proper alignment of affected body part  Description: INTERVENTIONS:  - Support, maintain and protect limb and body alignment  - Provide patient/ family with appropriate education  Outcome: Progressing

## 2021-06-14 NOTE — NURSING NOTE
06/14/21 After last therapy session, I was asked to look at patient's right stump  He reports feeling pain above the incision area  Noted moderate area of bruising around end with slight reddened area above incision where patient notes pain  PA made aware of same  Recent photo added to flowsheet  New orders noted   Chance Bonner RN

## 2021-06-14 NOTE — PROGRESS NOTES
Physical Medicine and Rehabilitation Progress Note  Georgia Alvarado 59 y o  male MRN: [id number]  Unit/Bed#: -01 Encounter: [id number]    HPI: Linda Melendez a 59 y  o  male who presented to the [address] [place] [address] for acute rehabilitation services on 6/10/21  Prior to this admission the patient required an emergent right AKA secondary to ischemia of RLE  A suspected embolism and aortic thrombus  Surgery was performed on 1/14/21 bu Dr Craig Early  He discharged to acute rehab on 2/25/21  [facility] 2/25/21 with increased SOB and hypoxia  He was discharged back to [facility] acute rehab on 3/3/21  From [facility] he discharged home  Pt received his prosthesis around 5/31 from [employer]  Patient accepted at [address] [place] [address] for prosthesis training secondary to right AKA  He has a PMH significant for right femoral DVT, anemia, aortic thrombus, Type 2 diabetes, Left hydropneumax, GERD, HTN, hypercholesterolemia, long-term methadone use due to chronic low back pain and cervical radiculopathy  Subjective:  Patient complaining about left ankle pain stating "I think I over did it yesterday in therapy " Patient also complaining of prosthesis feeling "too tight " Will talk with his therapist and see if we can call the prosthetic company to come take a look  No blisters formed but the skin becomes irritated with the prosthetic on for approximately 40 minutes  ROS: A 10 point ROS was performed; negative except as noted above         Assessment/Plan:    Chronic bilateral low back pain  Assessment & Plan  Continue methadone     Cervical radiculopathy  Assessment & Plan  Continue methadone 70 mg  Confirmed dose with nephew     S/P AKA (above knee amputation), right [facility])  Assessment & Plan  Acute chomprehensive interdisciplinary inpatient rehabilitation to include intensive skilled therapies as outlines with oversight and management by a rehabilitation Physician Assistant overseen by rehabilitation physician as well as inpatient rehabilitation nursing, case management and weekly interdisciplinary team meeting        Type 2 diabetes mellitus, with long-term current use of insulin ([facility] )  Assessment & Plan        Lab Results   Component Value Date     HGBA1C 7 7 (H) 05/29/2021         No results for input(s): POCGLU in the last 72 hours      Blood Sugar Average: Last 72 hrs:  Continue lantus 17 units HS, Janumet 50/1000 BID  Monitor accu-cheks  Hypoglycemia protocol in place     Vitamin D deficiency  Assessment & Plan  Continue cholecalciferol 1,000 units     Methadone maintenance therapy patient [facility])  Assessment & Plan  Patient with chronic low back pain and cervical radiculopathy  Has naloxone in medication regimen      Hyperlipidemia associated with type 2 diabetes mellitus ([facility] )  Assessment & Plan     Continue empagliflozain 25 mg oral for hyperlipidemia     HTN (hypertension)  Assessment & Plan  Continue lisinopril 10 mg daily  Monitor vitals daily      Bruising around right stump  -Added lidocaine cream 4% for 4 times PRN          Scheduled Meds:  Current Facility-Administered Medications   Medication Dose Route Frequency Provider Last Rate    acetaminophen  975 mg Oral [facility] Normie Schlatter, MD      albuterol  2 puff Inhalation Q4H PRN Surendra Aleksandr, PA-C      apixaban  5 mg Oral BID [place] Aleksandr, PA-C      bisacodyl  10 mg Rectal Daily PRN [place] Aleksandr, PA-C      cholecalciferol  2,000 Units Oral Daily Surendra Aleksandr, PA-C      Diclofenac Sodium  4 g Topical 4x Daily PRN Surendra Aleksandr, PA-C      docusate sodium  100 mg Oral BID [place] Aleksandr, PA-C      DULoxetine  30 mg Oral HS [place] Aleksandr, PA-C      Empagliflozin  25 mg Oral QAM [place] Aleksandr, PA-C      fluticasone  1 spray Nasal Daily [place] Aleksandr, PA-C      insulin glargine  17 Units Subcutaneous HS Surendra Aleksandr, PA-C      insulin lispro  1-5 Units Subcutaneous HS Arville Dano, PA-C      insulin lispro  1-5 Units Subcutaneous TID AC Arville Costain, PA-C      lisinopril  10 mg Oral Daily Manfred Jackson, PA-C      loratadine  10 mg Oral HS Manfred Jackson, PA-C      magnesium oxide  400 mg Oral BID Manfred Jackson, PA-C      methadone  70 mg Oral Daily Manfred Jackson, PA-C      multivitamin-minerals  1 tablet Oral Daily Manfred Jackson, PA-C      pantoprazole  20 mg Oral Daily Manfred Jackson, PA-C      pregabalin  75 mg Oral TID Manfred Jackson, PA-C      sitaGLIPtin-metFORMIN (JANUMET 50/1000) combo dose   Oral BID Manfred Jackson, PA-C         Objective:    Restrictions include:  right lower extremity with new prosthesis Fall precautions      Functional History - Prior to Admission:      Functional Status: Needed prior assistance with self care, indoor mobility  His cognition he is indepdent  Patient lives in a multilevel home     Functional Status Upon Admission to [facility]:  Mobility: Min assist  Transfers: Min assist  ADLs: Dependent for [facility] ADLs independent for [facility] ADLs      Physical Exam:  Temp:  [97 2 °F (36 2 °C)-97 5 °F (36 4 °C)] 97 5 °F (36 4 °C)  HR:  [76-84] 84  Resp:  [18] 18  BP: (121-136)/(56-72) 136/72  SpO2:  [96 %-98 %] 98 %    General: alert, no apparent distress, cooperative and comfortable  HEENT:  Head: Normocephalic, no lesions, without obvious abnormality    Eye: Normal external eye, conjunctiva, lidsc cornea  Ears: Normal external ears  Nose: Normal external nose, mucus membranes  CARDIAC:  regular rate and rhythm, S1, S2 normal, no murmur, click, rub or gallop  LUNGS:  no abnormal respiratory pattern, no retractions noted, non-labored breathing   ABDOMEN:  soft, non-tender, non-distended  EXTREMITIES:  extremities normal, warm and well-perfused; no cyanosis, clubbing, or edema and right stump without erythema, ecchymosis noted around stump   NEURO:  clear speech, following all commands, oriented x4  PSYCH:  Alert and oriented, appropriate affect  Diagnostic Studies: Labs reviewed  No orders to display       Laboratory: Labs reviewed  Results from last 7 days   Lab Units 06/11/21  0535   HEMOGLOBIN g/dL 12 8*   HEMATOCRIT % 38 1*   WBC Thousand/uL 6 80     Results from last 7 days   Lab Units 06/11/21  0535   BUN mg/dL 29*   SODIUM mmol/L 137   POTASSIUM mmol/L 4 4   CHLORIDE mmol/L 98   CREATININE mg/dL 0 75   AST U/L 19   ALT U/L 18            ** Please Note: Fluency Direct voice to text software may have been used in the creation of this document   **

## 2021-06-15 ENCOUNTER — APPOINTMENT (INPATIENT)
Dept: RADIOLOGY | Facility: HOSPITAL | Age: 61
DRG: 862 | End: 2021-06-15
Payer: COMMERCIAL

## 2021-06-15 LAB
ALBUMIN SERPL BCP-MCNC: 3.6 G/DL (ref 3.5–5.7)
ALP SERPL-CCNC: 94 U/L (ref 55–165)
ALT SERPL W P-5'-P-CCNC: 24 U/L (ref 7–52)
ANION GAP SERPL CALCULATED.3IONS-SCNC: 4 MMOL/L (ref 4–13)
AST SERPL W P-5'-P-CCNC: 26 U/L (ref 13–39)
BASOPHILS # BLD AUTO: 0 THOUSANDS/ΜL (ref 0–0.1)
BASOPHILS NFR BLD AUTO: 1 % (ref 0–2)
BILIRUB SERPL-MCNC: 0.4 MG/DL (ref 0.2–1)
BUN SERPL-MCNC: 21 MG/DL (ref 7–25)
CALCIUM SERPL-MCNC: 10 MG/DL (ref 8.6–10.5)
CHLORIDE SERPL-SCNC: 98 MMOL/L (ref 98–107)
CO2 SERPL-SCNC: 34 MMOL/L (ref 21–31)
CREAT SERPL-MCNC: 0.8 MG/DL (ref 0.7–1.3)
EOSINOPHIL # BLD AUTO: 0.3 THOUSAND/ΜL (ref 0–0.61)
EOSINOPHIL NFR BLD AUTO: 4 % (ref 0–5)
ERYTHROCYTE [DISTWIDTH] IN BLOOD BY AUTOMATED COUNT: 15.4 % (ref 11.5–14.5)
GFR SERPL CREATININE-BSD FRML MDRD: 95 ML/MIN/1.73SQ M
GLUCOSE SERPL-MCNC: 115 MG/DL (ref 65–99)
GLUCOSE SERPL-MCNC: 118 MG/DL (ref 65–140)
GLUCOSE SERPL-MCNC: 144 MG/DL (ref 65–140)
GLUCOSE SERPL-MCNC: 155 MG/DL (ref 65–140)
GLUCOSE SERPL-MCNC: 216 MG/DL (ref 65–140)
HCT VFR BLD AUTO: 37.5 % (ref 42–47)
HGB BLD-MCNC: 12.4 G/DL (ref 14–18)
LYMPHOCYTES # BLD AUTO: 2 THOUSANDS/ΜL (ref 0.6–4.47)
LYMPHOCYTES NFR BLD AUTO: 32 % (ref 21–51)
MCH RBC QN AUTO: 27 PG (ref 26–34)
MCHC RBC AUTO-ENTMCNC: 33.1 G/DL (ref 31–37)
MCV RBC AUTO: 82 FL (ref 81–99)
MONOCYTES # BLD AUTO: 0.6 THOUSAND/ΜL (ref 0.17–1.22)
MONOCYTES NFR BLD AUTO: 9 % (ref 2–12)
NEUTROPHILS # BLD AUTO: 3.4 THOUSANDS/ΜL (ref 1.4–6.5)
NEUTS SEG NFR BLD AUTO: 54 % (ref 42–75)
PLATELET # BLD AUTO: 183 THOUSANDS/UL (ref 149–390)
PMV BLD AUTO: 8.9 FL (ref 8.6–11.7)
POTASSIUM SERPL-SCNC: 4.5 MMOL/L (ref 3.5–5.5)
PROT SERPL-MCNC: 7.8 G/DL (ref 6.4–8.9)
RBC # BLD AUTO: 4.59 MILLION/UL (ref 4.3–5.9)
SODIUM SERPL-SCNC: 136 MMOL/L (ref 134–143)
WBC # BLD AUTO: 6.3 THOUSAND/UL (ref 4.8–10.8)

## 2021-06-15 PROCEDURE — 97530 THERAPEUTIC ACTIVITIES: CPT

## 2021-06-15 PROCEDURE — 97110 THERAPEUTIC EXERCISES: CPT

## 2021-06-15 PROCEDURE — 97535 SELF CARE MNGMENT TRAINING: CPT

## 2021-06-15 PROCEDURE — 80053 COMPREHEN METABOLIC PANEL: CPT | Performed by: PHYSICAL MEDICINE & REHABILITATION

## 2021-06-15 PROCEDURE — 73552 X-RAY EXAM OF FEMUR 2/>: CPT

## 2021-06-15 PROCEDURE — 99231 SBSQ HOSP IP/OBS SF/LOW 25: CPT | Performed by: PHYSICAL MEDICINE & REHABILITATION

## 2021-06-15 PROCEDURE — 97542 WHEELCHAIR MNGMENT TRAINING: CPT

## 2021-06-15 PROCEDURE — 82948 REAGENT STRIP/BLOOD GLUCOSE: CPT

## 2021-06-15 PROCEDURE — 85025 COMPLETE CBC W/AUTO DIFF WBC: CPT | Performed by: PHYSICAL MEDICINE & REHABILITATION

## 2021-06-15 RX ADMIN — ACETAMINOPHEN 975 MG: 325 TABLET ORAL at 21:03

## 2021-06-15 RX ADMIN — Medication 2000 UNITS: at 08:13

## 2021-06-15 RX ADMIN — DULOXETINE HYDROCHLORIDE 30 MG: 30 CAPSULE, DELAYED RELEASE ORAL at 21:03

## 2021-06-15 RX ADMIN — PREGABALIN 75 MG: 75 CAPSULE ORAL at 16:19

## 2021-06-15 RX ADMIN — MAGNESIUM OXIDE TAB 400 MG (241.3 MG ELEMENTAL MG) 400 MG: 400 (241.3 MG) TAB at 08:14

## 2021-06-15 RX ADMIN — PREGABALIN 75 MG: 75 CAPSULE ORAL at 08:12

## 2021-06-15 RX ADMIN — FLUTICASONE PROPIONATE 1 SPRAY: 50 SPRAY, METERED NASAL at 08:13

## 2021-06-15 RX ADMIN — EMPAGLIFLOZIN 25 MG: 25 TABLET, FILM COATED ORAL at 08:15

## 2021-06-15 RX ADMIN — METFORMIN HYDROCHLORIDE: 500 TABLET, FILM COATED ORAL at 18:03

## 2021-06-15 RX ADMIN — PREGABALIN 75 MG: 75 CAPSULE ORAL at 21:03

## 2021-06-15 RX ADMIN — LISINOPRIL 10 MG: 10 TABLET ORAL at 08:12

## 2021-06-15 RX ADMIN — INSULIN LISPRO 1 UNITS: 100 INJECTION, SOLUTION INTRAVENOUS; SUBCUTANEOUS at 21:03

## 2021-06-15 RX ADMIN — PANTOPRAZOLE SODIUM 20 MG: 20 TABLET, DELAYED RELEASE ORAL at 08:14

## 2021-06-15 RX ADMIN — INSULIN GLARGINE 17 UNITS: 100 INJECTION, SOLUTION SUBCUTANEOUS at 21:03

## 2021-06-15 RX ADMIN — Medication 1 TABLET: at 08:14

## 2021-06-15 RX ADMIN — DOCUSATE SODIUM 100 MG: 100 CAPSULE, LIQUID FILLED ORAL at 08:10

## 2021-06-15 RX ADMIN — ACETAMINOPHEN 975 MG: 325 TABLET ORAL at 05:39

## 2021-06-15 RX ADMIN — APIXABAN 5 MG: 5 TABLET, FILM COATED ORAL at 08:10

## 2021-06-15 RX ADMIN — MAGNESIUM OXIDE TAB 400 MG (241.3 MG ELEMENTAL MG) 400 MG: 400 (241.3 MG) TAB at 18:03

## 2021-06-15 RX ADMIN — METFORMIN HYDROCHLORIDE: 500 TABLET, FILM COATED ORAL at 08:10

## 2021-06-15 RX ADMIN — DOCUSATE SODIUM 100 MG: 100 CAPSULE, LIQUID FILLED ORAL at 18:03

## 2021-06-15 RX ADMIN — ACETAMINOPHEN 975 MG: 325 TABLET ORAL at 16:17

## 2021-06-15 RX ADMIN — LIDOCAINE 4%: 4 CREAM TOPICAL at 12:44

## 2021-06-15 RX ADMIN — METHADONE HYDROCHLORIDE 70 MG: 10 TABLET ORAL at 08:10

## 2021-06-15 RX ADMIN — APIXABAN 5 MG: 5 TABLET, FILM COATED ORAL at 18:03

## 2021-06-15 RX ADMIN — INSULIN LISPRO 1 UNITS: 100 INJECTION, SOLUTION INTRAVENOUS; SUBCUTANEOUS at 16:18

## 2021-06-15 RX ADMIN — LORATADINE 10 MG: 10 TABLET ORAL at 21:03

## 2021-06-15 NOTE — PLAN OF CARE
Problem: Potential for Falls  Goal: Patient will remain free of falls  Description: INTERVENTIONS:  - Assess patient frequently for physical needs  -  Identify cognitive and physical deficits and behaviors that affect risk of falls    -  [place] fall precautions as indicated by assessment   - Educate patient/family on patient safety including physical limitations  - Instruct patient to call for assistance with activity based on assessment  - Modify environment to reduce risk of injury  - Consider OT/PT consult to assist with strengthening/mobility  Outcome: Progressing     Problem: Prexisting or High Potential for Compromised Skin Integrity  Goal: Skin integrity is maintained or improved  Description: INTERVENTIONS:  - Identify patients at risk for skin breakdown  - Assess and monitor skin integrity  - Assess and monitor nutrition and hydration status  - Monitor labs   - Assess for incontinence   - Turn and reposition patient  - Assist with mobility/ambulation  - Relieve pressure over bony prominences  - Avoid friction and shearing  - Provide appropriate hygiene as needed including keeping skin clean and dry  - Evaluate need for skin moisturizer/barrier cream  - Collaborate with interdisciplinary team   - Patient/family teaching  - Consider wound care consult   Outcome: Progressing     Problem: PAIN - ADULT  Goal: Verbalizes/displays adequate comfort level or baseline comfort level  Description: Interventions:  - Encourage patient to monitor pain and request assistance  - Assess pain using appropriate pain scale  - Administer analgesics based on type and severity of pain and evaluate response  - Implement non-pharmacological measures as appropriate and evaluate response  - Consider cultural and social influences on pain and pain management  - Notify physician/advanced practitioner if interventions unsuccessful or patient reports new pain  Outcome: Progressing     Problem: SAFETY ADULT  Goal: Patient will remain free of falls  Description: INTERVENTIONS:  - Assess patient frequently for physical needs  -  Identify cognitive and physical deficits and behaviors that affect risk of falls    -  [place] fall precautions as indicated by assessment   - Educate patient/family on patient safety including physical limitations  - Instruct patient to call for assistance with activity based on assessment  - Modify environment to reduce risk of injury  - Consider OT/PT consult to assist with strengthening/mobility  Outcome: Progressing  Goal: Maintain or return to baseline ADL function  Description: INTERVENTIONS:  -  Assess patient's ability to carry out ADLs; assess patient's baseline for ADL function and identify physical deficits which impact ability to perform ADLs (bathing, care of mouth/teeth, toileting, grooming, dressing, etc )  - Assess/evaluate cause of self-care deficits   - Assess range of motion  - Assess patient's mobility; develop plan if impaired  - Assess patient's need for assistive devices and provide as appropriate  - Encourage maximum independence but intervene and supervise when necessary  - Involve family in performance of ADLs  - Assess for home care needs following discharge   - Consider OT consult to assist with ADL evaluation and planning for discharge  - Provide patient education as appropriate  Outcome: Progressing  Goal: Maintain or return mobility status to optimal level  Description: INTERVENTIONS:  - Assess patient's baseline mobility status (ambulation, transfers, stairs, etc )    - Identify cognitive and physical deficits and behaviors that affect mobility  - Identify mobility aids required to assist with transfers and/or ambulation (gait belt, sit-to-stand, lift, walker, cane, etc )  - [place] fall precautions as indicated by assessment  - Record patient progress and toleration of activity level on Mobility SBAR; progress patient to next Phase/Stage  - Instruct patient to call for assistance with activity based on assessment  - Consider rehabilitation consult to assist with strengthening/weightbearing, etc   Outcome: Progressing     Problem: DISCHARGE PLANNING  Goal: Discharge to home or other facility with appropriate resources  Description: INTERVENTIONS:  - Identify barriers to discharge w/patient and caregiver  - Arrange for needed discharge resources and transportation as appropriate  - Identify discharge learning needs (meds, wound care, etc )  - Arrange for interpretive services to assist at discharge as needed  - Refer to Case Management Department for coordinating discharge planning if the patient needs post-hospital services based on physician/advanced practitioner order or complex needs related to functional status, cognitive ability, or social support system  Outcome: Progressing     Problem: SAFETY ADULT  Goal: Patient will remain free of falls  Description: INTERVENTIONS:  - Assess patient frequently for physical needs  -  Identify cognitive and physical deficits and behaviors that affect risk of falls    -  [place] fall precautions as indicated by assessment   - Educate patient/family on patient safety including physical limitations  - Instruct patient to call for assistance with activity based on assessment  - Modify environment to reduce risk of injury  - Consider OT/PT consult to assist with strengthening/mobility  Outcome: Progressing  Goal: Maintain or return to baseline ADL function  Description: INTERVENTIONS:  -  Assess patient's ability to carry out ADLs; assess patient's baseline for ADL function and identify physical deficits which impact ability to perform ADLs (bathing, care of mouth/teeth, toileting, grooming, dressing, etc )  - Assess/evaluate cause of self-care deficits   - Assess range of motion  - Assess patient's mobility; develop plan if impaired  - Assess patient's need for assistive devices and provide as appropriate  - Encourage maximum independence but intervene and supervise when necessary  - Involve family in performance of ADLs  - Assess for home care needs following discharge   - Consider OT consult to assist with ADL evaluation and planning for discharge  - Provide patient education as appropriate  Outcome: Progressing  Goal: Maintain or return mobility status to optimal level  Description: INTERVENTIONS:  - Assess patient's baseline mobility status (ambulation, transfers, stairs, etc )    - Identify cognitive and physical deficits and behaviors that affect mobility  - Identify mobility aids required to assist with transfers and/or ambulation (gait belt, sit-to-stand, lift, walker, cane, etc )  - [place] fall precautions as indicated by assessment  - Record patient progress and toleration of activity level on Mobility SBAR; progress patient to next Phase/Stage  - Instruct patient to call for assistance with activity based on assessment  - Consider rehabilitation consult to assist with strengthening/weightbearing, etc   Outcome: Progressing     Problem: Knowledge Deficit  Goal: Patient/family/caregiver demonstrates understanding of disease process, treatment plan, medications, and discharge instructions  Description: Complete learning assessment and assess knowledge base    Interventions:  - Provide teaching at level of understanding  - Provide teaching via preferred learning methods  Outcome: Progressing     Problem: SKIN/TISSUE INTEGRITY - ADULT  Goal: Skin integrity remains intact  Description: INTERVENTIONS  - Identify patients at risk for skin breakdown  - Assess and monitor skin integrity  - Assess and monitor nutrition and hydration status  - Monitor labs (i e  albumin)  - Assess for incontinence   - Turn and reposition patient  - Assist with mobility/ambulation  - Relieve pressure over bony prominences  - Avoid friction and shearing  - Provide appropriate hygiene as needed including keeping skin clean and dry  - Evaluate need for skin moisturizer/barrier cream  - Collaborate with interdisciplinary team (i e  Nutrition, Rehabilitation, etc )   - Patient/family teaching  Outcome: Progressing  Goal: Incision(s), wounds(s) or drain site(s) healing without S/S of infection  Description: INTERVENTIONS  - Assess and document risk factors for skin impairment   - Assess and document dressing, incision, wound bed, drain sites and surrounding tissue  - Consider nutrition services referral as needed  - Oral mucous membranes remain intact  - Provide patient/ family education  Outcome: Progressing     Problem: MUSCULOSKELETAL - ADULT  Goal: Maintain or return mobility to safest level of function  Description: INTERVENTIONS:  - Assess patient's ability to carry out ADLs; assess patient's baseline for ADL function and identify physical deficits which impact ability to perform ADLs (bathing, care of mouth/teeth, toileting, grooming, dressing, etc )  - Assess/evaluate cause of self-care deficits   - Assess range of motion  - Assess patient's mobility  - Assess patient's need for assistive devices and provide as appropriate  - Encourage maximum independence but intervene and supervise when necessary  - Involve family in performance of ADLs  - Assess for home care needs following discharge   - Consider OT consult to assist with ADL evaluation and planning for discharge  - Provide patient education as appropriate  Outcome: Progressing  Goal: Maintain proper alignment of affected body part  Description: INTERVENTIONS:  - Support, maintain and protect limb and body alignment  - Provide patient/ family with appropriate education  Outcome: Progressing

## 2021-06-15 NOTE — PROGRESS NOTES
06/15/21 1135   Pain Assessment   Pain Assessment Tool 0-10   Pain Score 7   Pain Location/Orientation Orientation: Right;Other (Comment); Location: Leg  (Stump area)   Eating   Type of Assistance Needed Set-up / clean-up   Physical Assistance Level No physical assistance   Comment Adapt  Equip not required for meal   Eating CARE Score 5   Lower Body Dressing   Type of Assistance Needed Physical assistance   Physical Assistance Level 51%-75%   Comment Mod A to don stump , Pt  Ed  to techniques for donning , Pt able to demonstrate understanding of techniques by rolling  himself on 2nd donning, wearing exam gloves found to be helpful with donning  Lower Body Dressing CARE Score 2   Putting On/Taking Off Footwear   Type of Assistance Needed Adaptive equipment;Set-up / clean-up   Physical Assistance Level No physical assistance   Comment Sock aide to don compression sleeve and sock   Putting On/Taking Off Footwear CARE Score 5   Sit to Stand   Type of Assistance Needed Incidental touching   Physical Assistance Level 25% or less   Comment CG standing with prosthetic   Sit to Stand CARE Score 3   Bed-Chair Transfer   Type of Assistance Needed Supervision   Physical Assistance Level No physical assistance   Chair/Bed-to-Chair Transfer CARE Score 4   Coordination   Fine Motor putting on and taking off nuts from bolts to simulate closures on prosthetic   Cognition   Overall Cognitive Status WFL   Orientation Level Oriented X4   Activity Tolerance   Activity Tolerance Patient tolerated treatment well   Assessment   Treatment Assessment Pt Ed  to strategies for donning prothesis  Decrease strength in hands noticed making manipulation of prothesis difficult   not worn in the night as per pt  Stressed importance to staff of nightly wear schedule  Ed  to methods for donning  and practiced to increase functional independence and promote better fit of prothesis   Skin integrity checked after 20 minutes of wear, consult with PTA for agreement about condition of skin  Pinkness noted and Pt reports tenderness to touch  Pt discussed past fall, X-ray was ordered by nursing  PTA  is having prosthesis come back do to concerns of prosthetic fit  Pt  demonstrated inconsistent performance with transfers in session with poor postioning show today in comparison to prior sessions  Pt Ed  to proper placement of slide board in transfer  Pt is progressing slowly  It is recommended they continue skilled OT interventions for continued progress toward LTG and functional independence  Prognosis Fair   Problem List Decreased strength;Decreased range of motion;Decreased endurance; Impaired balance;Decreased safety awareness   Plan   Treatment/Interventions ADL retraining;Functional transfer training; Therapeutic exercise; Endurance training;Equipment eval/education; Compensatory technique education;OT   Progress Slow progress, medical status limitations   OT Therapy Minutes   OT Time In 1135   OT Time Out 1307   OT Total Time (minutes) 92   OT Mode of treatment - Individual (minutes) 92   OT Mode of treatment - Concurrent (minutes) 0   OT Mode of treatment - Group (minutes) 0   OT Mode of treatment - Co-treat (minutes) 0   OT Mode of Treatment - Total time(minutes) 92 minutes   OT Cumulative Minutes 233   Therapy Time missed   Time missed?  No

## 2021-06-15 NOTE — PROGRESS NOTES
06/15/21 1330   Pain Assessment   Pain Assessment Tool 0-10   Pain Score 6   Pain Location/Orientation Orientation: Right  (stump)   Restrictions/Precautions   Precautions Fall Risk;Pain   Cognition   Overall Cognitive Status WFL   Arousal/Participation Alert; Responsive; Cooperative   Attention Within functional limits   Orientation Level Oriented X4   Memory Within functional limits   Following Commands Follows all commands and directions without difficulty   Roll Left and Right   Type of Assistance Needed Independent   Physical Assistance Level No physical assistance   Roll Left and Right CARE Score 6   Sit to Lying   Type of Assistance Needed Independent   Physical Assistance Level No physical assistance   Sit to Lying CARE Score 6   Lying to Sitting on Side of Bed   Type of Assistance Needed Independent   Physical Assistance Level No physical assistance   Lying to Sitting on Side of Bed CARE Score 6   Sit to Stand   Type of Assistance Needed Supervision   Physical Assistance Level No physical assistance   Sit to Stand CARE Score 4   Bed-Chair Transfer   Type of Assistance Needed Supervision   Physical Assistance Level No physical assistance   Chair/Bed-to-Chair Transfer CARE Score 4   Transfer Bed/Chair/Wheelchair   Limitations Noted In Balance;Confidence; Coordination; Sequencing;UE Strength;LE Strength   Adaptive Equipment Roller Walker   Sit Pivot Supervision   Stand Pivot Supervision   Sit to Stand Supervision   Stand to Sit Supervision   Supine to Sit Modified Independent   Sit to Supine Modified Independent   Walk 10 Feet   Reason if not Attempted Safety concerns   Walk 10 Feet CARE Score 88   Ambulation   Does the patient walk? 2   Yes   Findings Amb not completed this session due to improper fit of sock for prothetic   Wheel 50 Feet with Two Turns   Type of Assistance Needed Supervision   Physical Assistance Level No physical assistance   Wheel 50 Feet with Two Turns CARE Score 4   Wheel 150 Feet   Type of Assistance Needed Supervision   Physical Assistance Level No physical assistance   Wheel 150 Feet CARE Score 4   Wheelchair mobility   Does the patient use a wheelchair? 1  Yes   Type of Wheelchair Used 1  Manual   Method Right upper extremity; Left upper extremity   Assistance Provided For Locking Brakes   Distance Level Surface (feet) 300 ft  (294)   Findings supervision   Curb or Single Stair   Reason if not Attempted Activity not applicable   1 Step (Curb) CARE Score 9   Toilet Transfer   Type of Assistance Needed Supervision   Physical Assistance Level No physical assistance   Comment sit pivot   Toilet Transfer CARE Score 4   Toilet Transfer   Surface Assessed Raised Toilet   Limitations Noted In Balance; Endurance; Safety   Adaptive Equipment Grab Bar   Findings sit pivot from  to commode   Therapeutic Interventions   Strengthening supine, seated, standing ther ex  Balance standing ther ex   Other WC, transfer   Assessment   Treatment Assessment Pt focusing on WC mobility and transfers this session  Prostetic not worn due to improper fit of sock insert  Juliana Marin from [email or web address], will come see pt on 6/17 at [facility] for fit check  Pt able to transfer with supervision onto multiple surfaces (mat table, bed, toilet)  Pt able to propell self in [facility] 294' to therapy gym, Mod I  Pt needing cuing for direction and locking breaks  Pt able to perform all seated and standing ther ex with no complications  Pt able to perform toilet transfer from [facility] to [facility] and back with  no complicatoins  Pt propelled self another 300' with Mod I  Pt seated in bed to end session with call bell in reach and all needs met  Pt will benefit from continued skilled Pt to promote independence with all mobility and over all strength  PT Barriers   Physical Impairment Decreased strength;Decreased range of motion;Decreased endurance; Impaired balance;Decreased mobility; Decreased safety awareness   Functional Limitation Ramp negotiation;Stair negotiation;Standing;Transfers; Walking; Wheelchair management   Plan   Treatment/Interventions Functional transfer training;LE strengthening/ROM; Elevations; Therapeutic exercise; Endurance training;Bed mobility;Gait training   Progress Slow progress, decreased activity tolerance   PT Therapy Minutes   PT Time In 1330   PT Time Out 1500   PT Total Time (minutes) 90   PT Mode of treatment - Individual (minutes) 90   PT Mode of treatment - Concurrent (minutes) 0   PT Mode of treatment - Group (minutes) 0   PT Mode of treatment - Co-treat (minutes) 0   PT Mode of Treatment - Total time(minutes) 90 minutes   PT Cumulative Minutes 275   Therapy Time missed   Time missed?  No

## 2021-06-15 NOTE — PCC PHYSICAL THERAPY
6/15/2021: Pt was showing gains in therapy until a red bruise appeared at distal end of stump, with now proximal radiation and pain  It appears as if pt is putting pressure on femur with minimal fat pad protection  Upon speaking with nursing and PA, pt will need a recheck of prosthetic by Robert Alberto from [place], on 6/17/2021 at [address]  Pt is Mod I for bed mobility and S for transfer  Pt needs encouragement for functional independence with with all aspects of transfer and WC mobility  Pt is able to ambulate 67' with RW and CG, WC mobility 300' with S and cuing for locking breaks  Pt has decreased endurance and strength  Pt will benefit from continued skilled PT to improve endurance, strength and functional independence  6/22/2021: Pt continues to participate in therapy session, very pleasant and agreeable  Pt able to don and doff prosthetic with minimal VC  Pt able to independently use WC, continues with S for slide board transfers due to safety and minimal VC for placement  Pt able to ambulate 120' with RW and S, requiring less and less VC for sequencing each trial  300' with WC mobility Mod I  Pt had prosthetic readjusted due to bruising of distal end of stump, bruising has now resolved and no issues with prosthetic since adjustment  MAFO applied to LLE for drop foot and eversion, has improved gait with minimal eversion  Pt able to don and doff stump  independently, compliant with wear time  Pt continues with decreased ROM/strength, mobility, and endurance  Pt will benefit from continued skilled PT to maximize for functional independence and improve overall endurance and strength for a safe return to home

## 2021-06-15 NOTE — NURSING NOTE
Patient resting comfortably in bed at this time  No signs of distress noted  Right residual limb with bruised areas tender to the touch tylenol administered as ordered  SCD as ordered for DVT prevention to the left lower extremity  Call bell within reach  Will continue to monitor patient and follow plan of care

## 2021-06-15 NOTE — PROGRESS NOTES
[facility]  Progress Note Valentin Buckner 1960, 59 y o  male MRN: [id number]  Unit/Bed#: -01 Encounter: [id number]  Primary Care Provider: VERONICA Lainez   Date and time admitted to hospital: 6/10/2021 11:44 AM     HPI: Hu Cameron a 59 y  o  male who presented to the [address] [place] [address] for acute rehabilitation services on 6/10/21  Prior to this admission the patient required an emergent right AKA secondary to ischemia of RLE  A suspected embolism and aortic thrombus  Surgery was performed on 1/14/21 bu Dr Tonya Tang  He discharged to acute rehab on 2/25/21  From [facility] he readmitted back to [facility] on 2/25/21 with increased SOB and hypoxia  He was discharged back to [facility] on 3/3/21  From [place] he discharged home  Pt received his prosthesis around 5/31 from [email or web address]  Patient accepted at [address] [place] [address] for prosthesis training secondary to right AKA  He has a PMH significant for right femoral DVT, anemia, aortic thrombus, Type 2 diabetes, Left hydropneumax, GERD, HTN, hypercholesterolemia, long-term methadone use due to chronic low back pain and cervical radiculopathy  Subjective:  Pt c/o pain/inflammation to right stump  Patient states he had difficulty sleeping last night due to the pain in the stump  Pt aware that therapy is going to reduce time in prosthetic  He is encourage to ice the area and apply volteran gel to the stump  Therapy noted patient had a fall at home prior to admission, patient noted he fell directly on stump  Therapy got in contact with prosthetic company, a representative should be coming by Friday to look at the prosthetic  In the meantime, will encourage conservative measures and will obtain an x-ray of the right stump to rule out any underlying pathology from his fall - he did not go to the ER for the fall  ROS: A 10 point ROS was performed; negative except as noted above  Assessment/Plan:    Chronic bilateral low back pain  Assessment & Plan  Continue methadone 70 mg    Cervical radiculopathy  Assessment & Plan  Continue methadone 70 mg    Type 2 diabetes mellitus, with long-term current use of insulin (HCC)  Assessment & Plan  Lab Results   Component Value Date    HGBA1C 7 7 (H) 05/29/2021       No results for input(s): POCGLU in the last 72 hours  Blood Sugar Average: Last 72 hrs:  Continue lantus 17 units HS, Janumet 50/1000 BID  Monitor accu-cheks daily  Hypoglycemia protocol in place      Vitamin D deficiency  Assessment & Plan  Continue cholecalciferol 1000 units    Methadone maintenance therapy patient [facility])  Assessment & Plan  Patient with chronic low back pain and cervical radiculopathy  Continue naloxone as needed  Cont methadone    Hyperlipidemia associated with type 2 diabetes mellitus (HCC)  Assessment & Plan    Continue empagliflozain 25 mg    Essential hypertension  Assessment & Plan  Continue lisinopril 10 mg daily  Monitor vitals    * S/P AKA (above knee amputation), right [facility])  Assessment & Plan  Acute chomprehensive interdisciplinary inpatient rehabilitation to include intensive skilled therapies as outlines with oversight and management by a rehabilitation Physician Assistant overseen by rehabilitation physician as well as inpatient rehabilitation nursing, case management and weekly interdisciplinary team meeting  Ecchymosis noted to stump after prosthesis usage  Patient states he is having difficulty sleeping with the pain  Will continue tylenol and methadone  Encourage ice  Added lidocaine cream, pharmacy stated they had none  Will try volteran gel           Scheduled Meds:  Current Facility-Administered Medications   Medication Dose Route Frequency Provider Last Rate    acetaminophen  975 mg Oral [facility] Kami Roldan MD      albuterol  2 puff Inhalation Q4H PRN Paulino Jarquin PA-C      apixaban  5 mg Oral BID JOSE Gore bisacodyl  10 mg Rectal Daily PRN Claudia , PA-C      cholecalciferol  2,000 Units Oral Daily Claudia India, PA-C      Diclofenac Sodium  4 g Topical 4x Daily PRN Claudia , PA-C      docusate sodium  100 mg Oral BID Claudia , PA-C      DULoxetine  30 mg Oral HS Claudia India, PA-C      Empagliflozin  25 mg Oral QAM Claudia Caddy, PA-C      fluticasone  1 spray Nasal Daily Claudia India, PA-C      insulin glargine  17 Units Subcutaneous HS Claudia , PA-C      insulin lispro  1-5 Units Subcutaneous HS Nena Moncho, PA-C      insulin lispro  1-5 Units Subcutaneous TID AC Nena Moncho, PA-C      lidocaine   Topical 4x Daily PRN Claudia India, PA-C      lisinopril  10 mg Oral Daily Claudia , PA-C      loratadine  10 mg Oral HS Claudia India, PA-C      magnesium oxide  400 mg Oral BID Claudia , PA-C      methadone  70 mg Oral Daily Claudia India, PA-C      multivitamin-minerals  1 tablet Oral Daily Claudia , PA-C      pantoprazole  20 mg Oral Daily Claudia , PA-C      pregabalin  75 mg Oral TID Claudia India, PA-C      sitaGLIPtin-metFORMIN (JANUMET 50/1000) combo dose   Oral BID Claudia Caddy, PA-C         Objective:  Restrictions include:  right lower extremity with new prosthesis Fall precautions      Functional History - Prior to Admission:      Functional Status: Needed prior assistance with self care, indoor mobility  His cognition he is indepdent   Patient lives in a multilevel home     Functional Status Upon Admission to [facility]:  Mobility: Min assist  Transfers: Min assist  ADLs: Dependent for LB ADLs independent for UB ADLs         Physical Exam:  Temp:  [97 1 °F (36 2 °C)-97 5 °F (36 4 °C)] 97 5 °F (36 4 °C)  HR:  [75-84] 75  Resp:  [18] 18  BP: (122-138)/(66-76) 138/76  SpO2:  [96 %-97 %] 97 %    General: alert, no apparent distress, cooperative and comfortable  HEENT:  Head: Normocephalic, no lesions, without obvious abnormality  Eye: Normal external eye, conjunctiva, lidsc cornea  Ears: Normal external ears  Nose: Normal external nose, mucus membranes  CARDIAC:  regular rate and rhythm, S1, S2 normal, no murmur, click, rub or gallop  LUNGS:  no abnormal respiratory pattern, no retractions noted, non-labored breathing   ABDOMEN:  soft, non-tender, non-distended  EXTREMITIES:  extremities normal, warm and well-perfused; no cyanosis, clubbing, or edema and right stump tender to palpate, ecchymosis noted bottom of stump, edema noted  NEURO:  clear speech, following all commands, oriented x4  PSYCH:  Alert and oriented, appropriate affect  Diagnostic Studies: Labs reviewed  No orders to display       Laboratory: Labs reviewed  Results from last 7 days   Lab Units 06/15/21  0547 06/11/21  0535   HEMOGLOBIN g/dL 12 4* 12 8*   HEMATOCRIT % 37 5* 38 1*   WBC Thousand/uL 6 30 6 80     Results from last 7 days   Lab Units 06/15/21  0547 06/11/21  0535   BUN mg/dL 21 29*   SODIUM mmol/L 136 137   POTASSIUM mmol/L 4 5 4 4   CHLORIDE mmol/L 98 98   CREATININE mg/dL 0 80 0 75   AST U/L 26 19   ALT U/L 24 18            ** Please Note: Fluency Direct voice to text software may have been used in the creation of this document   **

## 2021-06-15 NOTE — PCC OCCUPATIONAL THERAPY
6/15/2021 - Pt participated in self care through the use of compensatory strategies and safe techniques, safety with functional transfers, education on donning and doffing prosthetic equipment and generalized strength and endurance through the use of Thera Ex and Thera Act  LOF noted above  Pt making slow progress toward goals  Barriers to progress include diminished activity tolerance, pain, hand weakness and self limiting behaviors  Continued skilled OT to address established OT goals in prep for d/c home  6/22/2021 - Pt participated in self care through the use of compensatory strategies and safe techniques, safety with functional transfers, education on donning and doffing prosthetic equipment and generalized strength and endurance through the use of Thera Ex and Thera Act  LOF noted above  Pt making good progress toward goals  Barriers to progress include diminished activity tolerance, hand weakness and self limiting behaviors  Continued skilled OT to address established OT goals in prep for d/c to home

## 2021-06-16 LAB
GLUCOSE SERPL-MCNC: 133 MG/DL (ref 65–140)
GLUCOSE SERPL-MCNC: 149 MG/DL (ref 65–140)
GLUCOSE SERPL-MCNC: 178 MG/DL (ref 65–140)
GLUCOSE SERPL-MCNC: 216 MG/DL (ref 65–140)

## 2021-06-16 PROCEDURE — 97112 NEUROMUSCULAR REEDUCATION: CPT

## 2021-06-16 PROCEDURE — 82948 REAGENT STRIP/BLOOD GLUCOSE: CPT

## 2021-06-16 PROCEDURE — 97110 THERAPEUTIC EXERCISES: CPT

## 2021-06-16 PROCEDURE — 97116 GAIT TRAINING THERAPY: CPT

## 2021-06-16 PROCEDURE — 97542 WHEELCHAIR MNGMENT TRAINING: CPT

## 2021-06-16 PROCEDURE — 97535 SELF CARE MNGMENT TRAINING: CPT

## 2021-06-16 PROCEDURE — 97530 THERAPEUTIC ACTIVITIES: CPT

## 2021-06-16 RX ADMIN — MAGNESIUM OXIDE TAB 400 MG (241.3 MG ELEMENTAL MG) 400 MG: 400 (241.3 MG) TAB at 17:44

## 2021-06-16 RX ADMIN — ACETAMINOPHEN 975 MG: 325 TABLET ORAL at 22:09

## 2021-06-16 RX ADMIN — LORATADINE 10 MG: 10 TABLET ORAL at 22:09

## 2021-06-16 RX ADMIN — DOCUSATE SODIUM 100 MG: 100 CAPSULE, LIQUID FILLED ORAL at 09:44

## 2021-06-16 RX ADMIN — INSULIN LISPRO 2 UNITS: 100 INJECTION, SOLUTION INTRAVENOUS; SUBCUTANEOUS at 12:58

## 2021-06-16 RX ADMIN — DULOXETINE HYDROCHLORIDE 30 MG: 30 CAPSULE, DELAYED RELEASE ORAL at 22:09

## 2021-06-16 RX ADMIN — ACETAMINOPHEN 975 MG: 325 TABLET ORAL at 14:32

## 2021-06-16 RX ADMIN — METFORMIN HYDROCHLORIDE: 500 TABLET, FILM COATED ORAL at 09:43

## 2021-06-16 RX ADMIN — FLUTICASONE PROPIONATE 1 SPRAY: 50 SPRAY, METERED NASAL at 09:37

## 2021-06-16 RX ADMIN — Medication 1 TABLET: at 09:44

## 2021-06-16 RX ADMIN — ACETAMINOPHEN 975 MG: 325 TABLET ORAL at 05:41

## 2021-06-16 RX ADMIN — DICLOFENAC SODIUM 4 G: 10 GEL TOPICAL at 22:09

## 2021-06-16 RX ADMIN — PREGABALIN 75 MG: 75 CAPSULE ORAL at 22:12

## 2021-06-16 RX ADMIN — METFORMIN HYDROCHLORIDE: 500 TABLET, FILM COATED ORAL at 17:44

## 2021-06-16 RX ADMIN — Medication 2000 UNITS: at 09:46

## 2021-06-16 RX ADMIN — PANTOPRAZOLE SODIUM 20 MG: 20 TABLET, DELAYED RELEASE ORAL at 09:44

## 2021-06-16 RX ADMIN — LISINOPRIL 10 MG: 10 TABLET ORAL at 09:45

## 2021-06-16 RX ADMIN — EMPAGLIFLOZIN 25 MG: 25 TABLET, FILM COATED ORAL at 09:56

## 2021-06-16 RX ADMIN — LIDOCAINE 4%: 4 CREAM TOPICAL at 09:42

## 2021-06-16 RX ADMIN — MAGNESIUM OXIDE TAB 400 MG (241.3 MG ELEMENTAL MG) 400 MG: 400 (241.3 MG) TAB at 09:44

## 2021-06-16 RX ADMIN — PREGABALIN 75 MG: 75 CAPSULE ORAL at 17:44

## 2021-06-16 RX ADMIN — PREGABALIN 75 MG: 75 CAPSULE ORAL at 09:46

## 2021-06-16 RX ADMIN — INSULIN GLARGINE 17 UNITS: 100 INJECTION, SOLUTION SUBCUTANEOUS at 21:57

## 2021-06-16 RX ADMIN — APIXABAN 5 MG: 5 TABLET, FILM COATED ORAL at 09:44

## 2021-06-16 RX ADMIN — INSULIN LISPRO 1 UNITS: 100 INJECTION, SOLUTION INTRAVENOUS; SUBCUTANEOUS at 21:57

## 2021-06-16 RX ADMIN — APIXABAN 5 MG: 5 TABLET, FILM COATED ORAL at 17:44

## 2021-06-16 RX ADMIN — METHADONE HYDROCHLORIDE 70 MG: 10 TABLET ORAL at 09:45

## 2021-06-16 RX ADMIN — DOCUSATE SODIUM 100 MG: 100 CAPSULE, LIQUID FILLED ORAL at 17:44

## 2021-06-16 NOTE — NURSING NOTE
Patient resting comfortably in bed at this time  No signs of distress noted  Right residual limb with bruised areas tender to the touch tylenol administered as ordered  SCD as ordered for DVT prevention to the left lower extremity  Multipodas boot to the left foot to prevent foot drop overnight  Stump  worn to the right residual limb overnight  Call bell within reach  Will continue to monitor patient and follow plan of care

## 2021-06-16 NOTE — PROGRESS NOTES
06/16/21 0700   Pain Assessment   Pain Assessment Tool 0-10   Pain Score 6   Pain Location/Orientation Orientation: Right  (Stump)   Restrictions/Precautions   Precautions Fall Risk;Pain  (Balance)   Cognition   Overall Cognitive Status WFL   Arousal/Participation Alert; Responsive; Cooperative   Attention Within functional limits   Orientation Level Oriented X4   Memory Within functional limits   Following Commands Follows all commands and directions without difficulty   Subjective   Subjective Pt states he did not hvae a good night, his stump was in pain all night  Roll Left and Right   Type of Assistance Needed Independent   Physical Assistance Level No physical assistance   Roll Left and Right CARE Score 6   Sit to Lying   Type of Assistance Needed Independent   Physical Assistance Level No physical assistance   Sit to Lying CARE Score 6   Lying to Sitting on Side of Bed   Type of Assistance Needed Independent   Physical Assistance Level No physical assistance   Lying to Sitting on Side of Bed CARE Score 6   Sit to Stand   Type of Assistance Needed Incidental touching   Physical Assistance Level 25% or less   Comment CG   Sit to Stand CARE Score 3   Bed-Chair Transfer   Type of Assistance Needed Supervision   Physical Assistance Level No physical assistance   Chair/Bed-to-Chair Transfer CARE Score 4   Transfer Bed/Chair/Wheelchair   Sit Pivot Supervision   Sit to Stand Contact Guard   Stand to Sit Contact Guard   Supine to Sit Modified Independent   Sit to Supine Supervision   Walk 10 Feet   Reason if not Attempted Safety concerns   Walk 10 Feet CARE Score 88   Ambulation   Does the patient walk? 2  Yes   Primary Mode of Locomotion Prior to Admission Walk   Distance Walked (feet) 5 ft   Assist Device Parallel Bars   Gait Pattern Hopping;L foot drag; Forward Flexion; Improper weight shift   Limitations Noted In Balance; Coordination; Endurance; Heel Strike; Safety; Sequencing;Speed;Strength   Provided Assistance with: Balance;Direction;Trunk Support   Walk Assist Level Minimum Assist   Findings Pt unable to hop forward without ankle everting and dragging foot  Wheel 50 Feet with Two Turns   Type of Assistance Needed Supervision   Physical Assistance Level No physical assistance   Wheel 50 Feet with Two Turns CARE Score 4   Wheel 150 Feet   Type of Assistance Needed Supervision   Physical Assistance Level No physical assistance   Wheel 150 Feet CARE Score 4   Wheelchair mobility   Does the patient use a wheelchair? 1  Yes   Type of Wheelchair Used 1  Manual   Method Right upper extremity; Left upper extremity   Assistance Provided For Locking Brakes   Distance Level Surface (feet) 213 ft  (46, 126, 213)   Findings supervision   Curb or Single Stair   Reason if not Attempted Safety concerns   1 Step (Curb) CARE Score 88   4 Steps   Reason if not Attempted Safety concerns   4 Steps CARE Score 88   Therapeutic Interventions   Strengthening supine, seated, standing ther ex   Balance standing ther ex   Other WC, transfers   Assessment   Treatment Assessment Pt showing progression during therapy session with transitions  Pt able to perform slide board transfer from bed to chair with supervision and cuing for hand placement  Pt able to wheel self 45' with Mod I  Pt performed all seated and supine ther ex with no complications  Pt wheeled self 126' to parallel bars  Pt able to perform sit<>stand with min A for stability from WC to bars  Pt asked to perform forward hop and ankle everted  Pt caught self  seated in WC  Attempted again, pt able to take 3 hops forward and again ankle everted  Pt assesed by Lanette Sarkar PT, would benefit from a MAFO for foot drop and eversion  Pt wheeled self another 80' with Mod I  Pt seated in WC to end session with breakfast  All needs met and call bell in reach  Pt will benefit from continued skilled PT to improve strength and prosthetic traning when fitted properlly      PT Barriers   Physical Impairment Decreased strength;Decreased range of motion;Decreased endurance; Impaired balance;Decreased mobility;Pain   Functional Limitation Walking;Transfers;Standing;Stair negotiation;Ramp negotiation   Plan   Treatment/Interventions Functional transfer training; Therapeutic exercise;LE strengthening/ROM; Elevations; Endurance training;Bed mobility;Gait training   Progress Slow progress, decreased activity tolerance   PT Therapy Minutes   PT Time In 0700   PT Time Out 0830   PT Total Time (minutes) 90   PT Mode of treatment - Individual (minutes) 90   PT Mode of treatment - Concurrent (minutes) 0   PT Mode of treatment - Group (minutes) 0   PT Mode of treatment - Co-treat (minutes) 0   PT Mode of Treatment - Total time(minutes) 90 minutes   PT Cumulative Minutes 365   Therapy Time missed   Time missed?  No

## 2021-06-16 NOTE — TEAM CONFERENCE
Acute RehabilitationTeam Conference Note  Date: 6/16/2021   Time: 11:08 AM       Patient Name:  Rachid Brooks       Medical Record Number: [id number]   YOB: 1960  Sex:  Male          Room/Bed:  [facility] 222/[facility] 222-01  Payor Info:  Payor: Duane Yuan / Plan: Duane Yuan / Product Type: Medicaid HMO /      Admitting Diagnosis: Hx of AKA (above knee amputation), right ([place] [address] ) [Z89 [id number]]   Admit Date/Time:  6/10/2021 11:44 AM  Admission Comments: No comment available     Primary Diagnosis:  S/P AKA (above knee amputation), right ([facility])  Principal Problem: S/P AKA (above knee amputation), right [facility])    Patient Active Problem List    Diagnosis Date Noted    Above-knee amputation of right lower extremity ([place] [address] ) 05/11/2021    Cervical radiculopathy 04/26/2021    Chronic bilateral low back pain 04/26/2021    Moderate protein-calorie malnutrition ([place] [address] ) 03/12/2021    S/P AKA (above knee amputation), right ([place] [address] ) 03/11/2021    Drug-induced constipation 03/08/2021    Empyema lung ([place] [address] ) 03/01/2021    Prolonged Q-T interval on ECG 02/17/2021    Right femoral vein DVT ([place] [address] ) 02/09/2021    Aortic thrombus ([place] [address] ) 01/13/2021    Type 2 diabetes mellitus, with long-term current use of insulin ([place] [address] ) 01/11/2021    Acute respiratory failure with hypoxia ([place] [address] ) 01/11/2021    Hematuria 01/11/2021    Vitamin D deficiency 12/05/2019    Mononeuropathy, unspecified 11/07/2019    Mixed hyperlipidemia 11/07/2019    Proteinuria 11/07/2019    Positive depression screening 08/08/2019    Bilateral lower extremity edema 11/27/2018    Callous ulcer, limited to breakdown of skin ([facility] ) 10/30/2018    Essential hypertension 04/13/2017    Hyperlipidemia associated with type 2 diabetes mellitus ([place] [address] ) 04/13/2017    Methadone maintenance therapy patient ([place] [address] ) 04/13/2017    Type 2 diabetes mellitus with diabetic polyneuropathy ([facility] ) 04/13/2017    Chronic hepatitis C without hepatic coma ([facility] ) 02/27/2017       Physical Therapy:    Weight Bearing Status: Full Weight Bearing  Transfers: Supervision  Bed Mobility: Supervision  Amulation Distance (ft): 72 feet  Ambulation: Incidental Touching  Assistive Device for Ambulation: Roller Walker  Wheelchair Mobility Distance: 300 ft  Wheelchair Mobility: Supervision  Number of Stairs: 0  Discharge Recommendations: Home with:  [address] with[de-identified] Family Support, First Floor Setup, Home Physical Therapy    6/15/2021: Pt was showing gains in therapy until a red bruise appeared at distal end of stump, with now proximal radiation and pain  It appears as if pt is putting pressure on femur with minimal fat pad protection  Upon speaking with nursing and PA, pt will need a recheck of prosthetic by Yolanda Fisher from [place] on 6/17/2021 at [address]  Pt is Mod I for bed mobility and S for transfer  Pt needs encouragement for functional independence with with all aspects of transfer and WC mobility  Pt is able to ambulate 67' with RW and CG, WC mobility 300' with S and cuing for locking breaks  Pt has decreased endurance and strength  Pt will benefit from continued skilled PT to improve endurance, strength and functional independence  Occupational Therapy:  Eating: Supervision  Grooming: Supervision  Bathing: Minimal Assistance  Bathing: Minimal Assistance  Upper Body Dressing: Supervision  Lower Body Dressing: Minimal Assistance  Toileting:  (TBA)  Tub/Shower Transfer:  (TBA)  Toilet Transfer: Incidental Touching  Cognition: Within Defined Limits  Orientation: Person, Place, Time, Situation  Discharge Recommendations: Home with:  [address] with[de-identified] Family Support, Home Occupational Therapy       6/15/2021 - Pt participated in self care through the use of compensatory strategies and safe techniques, safety with functional transfers, education on donning and doffing prosthetic equipment and generalized strength and endurance through the use of Thera Ex and [address]  LOF noted above   Pt making slow progress toward goals  Barriers to progress include diminished activity tolerance, pain, hand weakness and self limiting behaviors  Continued skilled OT to address established OT goals in prep for d/c home  Speech Therapy:           No notes on file    Nursing Notes:  Appetite: Good  Diet Type: Regular/House                      Diet Patient/Family Education Complete: Yes                         Type of Wound Patient/Family Education: Yes  Bladder: 4 - Minimal Assistance     Bladder Patient/Family Education: Yes  Bowel: 4 - Minimal Assistance     Bowel Patient/Family Education: Yes  Pain Location/Orientation: Orientation: Right, Location: Leg  Pain Score: 7                       Hospital Pain Intervention(s): Medication (See MAR)  Pain Patient/Family Education: Yes       6/15/21 Patient is a recent admission to [facility] for prosthesis training after a right above the knee amputation  Patient remains alert and oriented  Lungs are clear but diminished on room air  Patient was continent of bowel and bladder  Allevyn dressing to the sacrum for protection  Right residual limb with noted bruising and tenderness noted lidocaine cream as needed for discomfort and therapy to have prosthesis checked for proper fit for patient  Patient is able to stand pivot transfer with one assist and walker  Blood sugars are being monitored four times a day as ordered and inulin administered as required  Case Management:     Discharge Planning  Living Arrangements: Family members  Support Systems: Family members  Assistance Needed: xfrs from wc to commode  Type of Current Residence: Private residence  Current Home Care Services: No  Initial assessment & orientation to [facility] with Pt, who expressed understanding & agreement  Message left for Pt's nephew  Pt resides in a multi-story home with his 2 nephews, sister & brother, and will have 24 hour supervision & assistance  There is a FF set up & ramp entrance   Pt has a WC, SPC, QC, DAC, SB, but does NOT have a walker  Discussed role of team members & reviewed [address] with Pt, who expressed understanding & agreement  SW will continue to monitor & assist as needed with [address]  See UR section for insurance information  Is the patient actively participating in therapies? yes  List any modifications to the treatment plan: na    Barriers Interventions   DM Insulin dependent, pt  education   Skin irritation at residual limb Prosthetist to follow for proper fitting   Decreased balance, end ADL, transfer, gait training   Decreased strength, left side foot drop Therapeutic exercise, therapeutic activity, bracing          Is the patient making expected progress toward goals?  yes  List any update or changes to goals: na    Medical Goals: Patient will be able to manage medical conditions and comorbid conditions with medications and follow up upon completion of rehab program    Weekly Team Goals:   Rehab Team Goals  ADL Team Goal: Patient will require supervision with ADLs with least restrictive device upon completion of rehab program  Transfer Team Goal: Patient will be independent with transfers with least restrictive device upon completion of rehab program  Locomotion Team Goal: Patient will require assist with locomotion with least restrictive device upon completion of rehab program     Mod I wc mobility, bed mobility, transfer  CG/S mobility with prosthesis  CG bathing and LB dressing with prosthesis    Health and wellness: to be able to walk with prosthesis    Discussion: Plan for return home with family with Sena Flores for PT, OT, nursing     Anticipated Discharge Date:  June 25, 2021

## 2021-06-16 NOTE — PROGRESS NOTES
06/16/21 0900   Pain Assessment   Pain Assessment Tool 0-10   Pain Score 1   Pain Location/Orientation Orientation: Right;Location: Leg   Restrictions/Precautions   Precautions Fall Risk;Limb alert   Oral Hygiene   Comment performed prior to therapist entry to room    Grooming   Able To Wash/Dry Hands; Wash/Dry Face;Comb/Brush Hair   Shower/Bathe Self   Type of Assistance Needed Set-up / clean-up   Physical Assistance Level No physical assistance   Comment seated in shower on Shower seat   Shower/Bathe Self CARE Score 5   Bathing   Assessed Bath Style Shower   Anticipated D/C Bath Style Sponge Bath   Able to Khoa Silvio Yes   Able to [employer] Temperature Other   Able to Wash/Rinse/Dry (body part) Left Arm;Right Arm;L Upper Leg;R Upper Leg;L Lower Leg/Foot;Chest;Abdomen;Buttocks; Perineal Area   Limitations Noted in Problem Solving;Balance   Adaptive Equipment Shower Bars; Shower Seat;[employer]  S/U, weight shifted for victor hugo, buttock hygiene   Tub/Shower Transfer   Limitations Noted In Balance; Safety;Problem Solving   Adaptive Equipment Grab Bars;Seat with out Back   Assessed Shower   Findings CG for Transfer, Pt Ed to safe transfer methods, demonstrates good understanding in transfer   Upper Body Dressing   Type of Assistance Needed Set-up / clean-up   Physical Assistance Level No physical assistance   Upper Body Dressing CARE Score 5   Lower Body Dressing   Type of Assistance Needed Physical assistance   Physical Assistance Level 51%-75%   Comment ModA for Donning Lizzy Egan for Donning Pants support weight in arms to aide in pulling  up pants don by weight shifting    Lower Body Dressing CARE Score 2   Putting On/Taking Off Footwear   Type of Assistance Needed Adaptive equipment   Physical Assistance Level No physical assistance   Comment sock aide used to don compression sock and sock, Pt demonstrates good strategies for donning switching between equipment and bringing leg up to don clothing   Putting On/Taking Off Footwear CARE Score 6   Dressing/Undressing Clothing   Able to  Obtain Clothing   Remove UB Clothes Pullover Shirt   Don UB Clothes Pullover Shirt   Remove LB Clothes Shorts;Socks; Shoes; Other  ()   Don LB Clothes Shorts;Socks; Shoes; Other  ()   Amount of Physical Assistance Provided 50%-74%   Limitations Noted In Endurance;Strength   Adaptive Equipment Other  (rubber gloves )   Positioning Supported Sit   Findings Mod A to don  able to perform more of the prepratory work prior to donning this session, Ed  to techniques to check for fit   Sit to Lying   Type of Assistance Needed Independent   Physical Assistance Level No physical assistance   Comment lying to don    Sit to Lying CARE Score 6   Lying to Sitting on Side of Bed   Type of Assistance Needed Independent   Physical Assistance Level No physical assistance   Lying to Sitting on Side of Bed CARE Score 6   Sit to Stand   Type of Assistance Needed Incidental touching   Physical Assistance Level 25% or less   Comment CG   Sit to Stand CARE Score 3   Bed-Chair Transfer   Type of Assistance Needed Adaptive equipment   Physical Assistance Level No physical assistance   Comment Placed and used slide board to get into bed   Chair/Bed-to-Chair Transfer CARE Score 6   Transfer Bed/Chair/Wheelchair   Limitations Noted In Confidence; Sequencing   Adaptive Equipment Transfer Board   Findings transfers from [facility] to bed good understanding of WC positioning demonstrated by Pt   Exercise Tools   Hand Gripper rubber band resistance removing pegs with right hand    Coordination   Fine Motor tying and untying knots, in hand manipulation of  pieces and pincer grasp to move pieces working to strengthen, increase coordination in right hand   Cognition   Overall Cognitive Status WFL   Orientation Level Oriented X4   Activity Tolerance   Activity Tolerance Patient limited by fatigue   Assessment   Treatment Assessment Pt Ed  to compensatory strategies for bathing able to demonstrate a good understanding by completing hygiene at a S/U level  Pt is limited by confidence in shower transfers is able to perform but hesistates without CG  Pt reports gloves help in donning , hand weakness makes donning gloves difficult, will continue to work on increasing strength in hands  Pt worked on finger tip grasping to help with donning prosthetic as focus of Thera Ex component of session  Prosthetic not donned this session do to prosesitis coming tomorrow to evaluate fit  Pt is progressing slowly toward goals  It is recommended Pt continue skilled OT interevention to continue progress toward LTG and increased fuctional independence  Prognosis Fair   Problem List Decreased strength;Decreased endurance;Decreased safety awareness;Decreased skin integrity   Plan   Treatment/Interventions ADL retraining;Functional transfer training;Equipment eval/education; Compensatory technique education;OT   Progress Progressing toward goals   OT Therapy Minutes   OT Time In 0900   OT Time Out 1030   OT Total Time (minutes) 90   OT Mode of treatment - Individual (minutes) 90   OT Mode of treatment - Concurrent (minutes) 0   OT Mode of treatment - Group (minutes) 0   OT Mode of treatment - Co-treat (minutes) 0   OT Mode of Treatment - Total time(minutes) 90 minutes   OT Cumulative Minutes 323   Therapy Time missed   Time missed?  No

## 2021-06-16 NOTE — PROGRESS NOTES
[facility]  Progress Note Simona Friday 1960, 59 y o  male MRN: [id number]  Unit/Bed#: -01 Encounter: [id number]  Primary Care Provider: Christell Lefort, CRNP   Date and time admitted to hospital: 6/10/2021 11:44 AM     HPI: Justo Ramos a 59 y  o  male who presented to the [address] [place] [address] for acute rehabilitation services on 6/10/21  Prior to this admission the patient required an emergent right AKA secondary to ischemia of RLE  A suspected embolism and aortic thrombus  Surgery was performed on 1/14/21 bu Dr Cannon Certain  He discharged to acute rehab on 2/25/21  From [facility] he readmitted back to [address] [place] [address] on 2/25/21 with increased SOB and hypoxia  He was discharged back to [facility] on 3/3/21  From [facility] he discharged home  Pt received his prosthesis around 5/31 from [facility]  Patient accepted at [address] [place] [address] for prosthesis training secondary to right AKA  He has a PMH significant for right femoral DVT, anemia, aortic thrombus, Type 2 diabetes, Left hydropneumax, GERD, HTN, hypercholesterolemia, long-term methadone use due to chronic low back pain and cervical radiculopathy  Subjective:  Pt seen by Yefri Triplett today  Prosthetic adjusted and patient states he feels more comfortable  Patient was walking down the ramos with prosthetic with no complaints of discomfort  Patient will follow up as an outpatient with Yefri Triplett for AFO brace placement to left ankle  ROS: A 10 point ROS was performed; negative except as noted above         Assessment/Plan:  Chronic bilateral low back pain  Assessment & Plan  Continue methadone 70 mg     Cervical radiculopathy  Assessment & Plan  Continue methadone 70 mg     Type 2 diabetes mellitus, with long-term current use of insulin (HCC)  Assessment & Plan        Lab Results   Component Value Date     HGBA1C 7 7 (H) 05/29/2021         No results for input(s): POCGLU in the last 72 hours      Blood Sugar Average: Last 72 hrs:  Continue lantus 17 units HS, Janumet 50/1000 BID  Monitor accu-cheks daily  Hypoglycemia protocol in place        Vitamin D deficiency  Assessment & Plan  Continue cholecalciferol 1000 units     Methadone maintenance therapy patient [facility])  Assessment & Plan  Patient with chronic low back pain and cervical radiculopathy  Continue naloxone as needed  Cont methadone     Hyperlipidemia associated with type 2 diabetes mellitus (HCC)  Assessment & Plan     Continue empagliflozain 25 mg     Essential hypertension  Assessment & Plan  Continue lisinopril 10 mg daily  Monitor vitals     * S/P AKA (above knee amputation), right [facility])  Assessment & Plan  Acute chomprehensive interdisciplinary inpatient rehabilitation to include intensive skilled therapies as outlines with oversight and management by a rehabilitation Physician Assistant overseen by rehabilitation physician as well as inpatient rehabilitation nursing, case management and weekly interdisciplinary team meeting  Ecchymosis noted to stump after prosthesis usage  Patient states he is having difficulty sleeping with the pain  Will continue tylenol and methadone  Encourage ice  Ryley Hartman saw patient, adjust prosthesis   Outpatient follow up with Ryley Hartman for AFO brace         Scheduled Meds:  Current Facility-Administered Medications   Medication Dose Route Frequency Provider Last Rate    acetaminophen  975 mg Oral [facility] Radha Tovar MD      albuterol  2 puff Inhalation Q4H PRN Priscilla Quintanilla PA-C      apixaban  5 mg Oral BID Priscilla Quintanilla PA-C      bisacodyl  10 mg Rectal Daily PRN Priscilla Quintanilla PA-C      cholecalciferol  2,000 Units Oral Daily Priscilla Quintanilla PA-C      Diclofenac Sodium  4 g Topical 4x Daily PRN Priscilla Quintanilla PA-C      docusate sodium  100 mg Oral BID Priscilla Quintanilla PA-C      DULoxetine  30 mg Oral HS Priscilla Quintanilla PA-C      Empagliflozin  25 mg Oral QAM Priscilla Quintanilla JOSE      fluticasone  1 spray Nasal Daily Priscilla Quintanilla, JOSE      insulin glargine  17 Units Subcutaneous HS Mattiejune Gusmanlas, PA-ELSIE      insulin lispro  1-5 Units Subcutaneous HS Chelle RandallJOSE      insulin lispro  1-5 Units Subcutaneous TID AC Chelle Randall PA-C      lidocaine   Topical 4x Daily PRN Refjune Dwight, JOSE      lisinopril  10 mg Oral Daily Reford Dwight, JOSE      loratadine  10 mg Oral HS Refjune Dwight, JOSE      magnesium oxide  400 mg Oral BID Reford Dwight, JOSE      methadone  70 mg Oral Daily Reford Dwight, PA-ELSIE      multivitamin-minerals  1 tablet Oral Daily Reford Dwight, JOSE      pantoprazole  20 mg Oral Daily Reford Dwight, JOSE      pregabalin  75 mg Oral TID Refjune Dwight, JOSE      sitaGLIPtin-metFORMIN (JANUMET 50/1000) combo dose   Oral BID Priscilla Quintanilla PA-C         Objective:  Restrictions include:  right lower extremity with new prosthesis Fall precautions      Functional History - Prior to Admission:      Functional Status: Needed prior assistance with self care, indoor mobility  His cognition he is indepdent   Patient lives in a multilevel home     Functional Status     Physical Therapy:     Weight Bearing Status: Full Weight Bearing  Transfers: Supervision  Bed Mobility: Supervision  Amulation Distance (ft): 72 feet  Ambulation: Incidental Touching  Assistive Device for Ambulation: Roller Walker  Wheelchair Mobility Distance: 300 ft  Wheelchair Mobility: Supervision  Number of Stairs: 0  Discharge Recommendations: Home with:  [address] with[de-identified] Family Support, First Floor Setup, Home Physical Therapy  Occupational Therapy:  Eating: Supervision  Grooming: Supervision  Bathing: Minimal Assistance  Bathing: Minimal Assistance  Upper Body Dressing: Supervision  Lower Body Dressing: Minimal Assistance  Toileting:  (TBA)  Tub/Shower Transfer:  (TBA)  Toilet Transfer: Incidental Touching  Cognition: Within Defined Limits  Orientation: Person, Place, Time, Situation  Discharge Recommendations: Home with:  [address] with[de-identified] Family Support, Home Occupational Therapy    This patient was discussed by the Interdisciplinary Team in weekly case conference today  The care of the patient was extensively discussed with all care providers and an appropriate rehabilitation plan was formulated unique for this patient  Barriers were identified preventing progression of therapy and appropriate interventions were discussed with each discipline  Please see the team note for input from all disciplines regarding barriers, intervention, and discharge planning  Total time spent: 35 Mins, and greater than 50% of this time was spent counseling/coordinating care    Physical Exam:  Temp:  [96 9 °F (36 1 °C)-97 3 °F (36 3 °C)] 96 9 °F (36 1 °C)  HR:  [83-84] 84  Resp:  [16-18] 16  BP: (110-144)/(60-65) 144/65  SpO2:  [96 %-97 %] 97 %    General: alert, no apparent distress, cooperative and comfortable  HEENT:  Head: Normocephalic, no lesions, without obvious abnormality  Eye: Normal external eye, conjunctiva, lidsc cornea  Ears: Normal external ears  Nose: Normal external nose, mucus membranes  CARDIAC:  regular rate and rhythm, S1, S2 normal, no murmur, click, rub or gallop  LUNGS:  no abnormal respiratory pattern, no retractions noted, non-labored breathing   ABDOMEN:  soft, non-tender, non-distended  EXTREMITIES:  extremities normal, warm and well-perfused; no cyanosis, clubbing, or edema and right stump tender to palpate, ecchymosis noted bottom of stump, edema noted  NEURO:  clear speech, following all commands, oriented x4  PSYCH:  Alert and oriented, appropriate affect            Diagnostic Studies: Labs reviewed  XR femur 2 vw right    (Results Pending)       Laboratory: Labs reviewed  Results from last 7 days   Lab Units 06/15/21  0547 06/11/21  0535   HEMOGLOBIN g/dL 12 4* 12 8*   HEMATOCRIT % 37 5* 38 1*   WBC Thousand/uL 6 30 6 80     Results from last 7 days Lab Units 06/15/21  0547 06/11/21  0535   BUN mg/dL 21 29*   SODIUM mmol/L 136 137   POTASSIUM mmol/L 4 5 4 4   CHLORIDE mmol/L 98 98   CREATININE mg/dL 0 80 0 75   AST U/L 26 19   ALT U/L 24 18            ** Please Note: Fluency Direct voice to text software may have been used in the creation of this document   **

## 2021-06-17 LAB
GLUCOSE SERPL-MCNC: 144 MG/DL (ref 65–140)
GLUCOSE SERPL-MCNC: 148 MG/DL (ref 65–140)
GLUCOSE SERPL-MCNC: 165 MG/DL (ref 65–140)
GLUCOSE SERPL-MCNC: 180 MG/DL (ref 65–140)

## 2021-06-17 PROCEDURE — 97535 SELF CARE MNGMENT TRAINING: CPT

## 2021-06-17 PROCEDURE — 99253 IP/OBS CNSLTJ NEW/EST LOW 45: CPT | Performed by: ORTHOPAEDIC SURGERY

## 2021-06-17 PROCEDURE — 97110 THERAPEUTIC EXERCISES: CPT

## 2021-06-17 PROCEDURE — 97116 GAIT TRAINING THERAPY: CPT

## 2021-06-17 PROCEDURE — 99233 SBSQ HOSP IP/OBS HIGH 50: CPT | Performed by: PHYSICAL MEDICINE & REHABILITATION

## 2021-06-17 PROCEDURE — 82948 REAGENT STRIP/BLOOD GLUCOSE: CPT

## 2021-06-17 PROCEDURE — 97542 WHEELCHAIR MNGMENT TRAINING: CPT

## 2021-06-17 PROCEDURE — 97530 THERAPEUTIC ACTIVITIES: CPT

## 2021-06-17 PROCEDURE — 97760 ORTHOTIC MGMT&TRAING 1ST ENC: CPT

## 2021-06-17 RX ADMIN — ACETAMINOPHEN 975 MG: 325 TABLET ORAL at 21:03

## 2021-06-17 RX ADMIN — DICLOFENAC SODIUM 4 G: 10 GEL TOPICAL at 15:00

## 2021-06-17 RX ADMIN — INSULIN LISPRO 1 UNITS: 100 INJECTION, SOLUTION INTRAVENOUS; SUBCUTANEOUS at 21:08

## 2021-06-17 RX ADMIN — DOCUSATE SODIUM 100 MG: 100 CAPSULE, LIQUID FILLED ORAL at 09:46

## 2021-06-17 RX ADMIN — ACETAMINOPHEN 975 MG: 325 TABLET ORAL at 05:48

## 2021-06-17 RX ADMIN — INSULIN LISPRO 1 UNITS: 100 INJECTION, SOLUTION INTRAVENOUS; SUBCUTANEOUS at 09:47

## 2021-06-17 RX ADMIN — LORATADINE 10 MG: 10 TABLET ORAL at 21:03

## 2021-06-17 RX ADMIN — METHADONE HYDROCHLORIDE 70 MG: 10 TABLET ORAL at 09:46

## 2021-06-17 RX ADMIN — METFORMIN HYDROCHLORIDE: 500 TABLET, FILM COATED ORAL at 09:47

## 2021-06-17 RX ADMIN — EMPAGLIFLOZIN 25 MG: 25 TABLET, FILM COATED ORAL at 09:51

## 2021-06-17 RX ADMIN — APIXABAN 5 MG: 5 TABLET, FILM COATED ORAL at 17:49

## 2021-06-17 RX ADMIN — APIXABAN 5 MG: 5 TABLET, FILM COATED ORAL at 09:47

## 2021-06-17 RX ADMIN — FLUTICASONE PROPIONATE 1 SPRAY: 50 SPRAY, METERED NASAL at 09:50

## 2021-06-17 RX ADMIN — INSULIN GLARGINE 17 UNITS: 100 INJECTION, SOLUTION SUBCUTANEOUS at 21:03

## 2021-06-17 RX ADMIN — PREGABALIN 75 MG: 75 CAPSULE ORAL at 09:46

## 2021-06-17 RX ADMIN — ACETAMINOPHEN 975 MG: 325 TABLET ORAL at 14:57

## 2021-06-17 RX ADMIN — Medication 2000 UNITS: at 09:47

## 2021-06-17 RX ADMIN — Medication 1 TABLET: at 09:46

## 2021-06-17 RX ADMIN — PANTOPRAZOLE SODIUM 20 MG: 20 TABLET, DELAYED RELEASE ORAL at 09:46

## 2021-06-17 RX ADMIN — LISINOPRIL 10 MG: 10 TABLET ORAL at 09:46

## 2021-06-17 RX ADMIN — PREGABALIN 75 MG: 75 CAPSULE ORAL at 21:03

## 2021-06-17 RX ADMIN — METFORMIN HYDROCHLORIDE: 500 TABLET, FILM COATED ORAL at 17:49

## 2021-06-17 RX ADMIN — DOCUSATE SODIUM 100 MG: 100 CAPSULE, LIQUID FILLED ORAL at 17:49

## 2021-06-17 RX ADMIN — MAGNESIUM OXIDE TAB 400 MG (241.3 MG ELEMENTAL MG) 400 MG: 400 (241.3 MG) TAB at 17:49

## 2021-06-17 RX ADMIN — DULOXETINE HYDROCHLORIDE 30 MG: 30 CAPSULE, DELAYED RELEASE ORAL at 21:03

## 2021-06-17 RX ADMIN — PREGABALIN 75 MG: 75 CAPSULE ORAL at 17:49

## 2021-06-17 RX ADMIN — MAGNESIUM OXIDE TAB 400 MG (241.3 MG ELEMENTAL MG) 400 MG: 400 (241.3 MG) TAB at 09:46

## 2021-06-17 NOTE — PROGRESS NOTES
06/17/21 0900   Pain Assessment   Pain Assessment Tool 0-10   Pain Score 6   Pain Location/Orientation Orientation: Right;Location: Leg   Restrictions/Precautions   Precautions Fall Risk   Cognition   Overall Cognitive Status WFL   Arousal/Participation Alert; Responsive; Cooperative   Attention Within functional limits   Orientation Level Oriented X4   Memory Within functional limits   Following Commands Follows all commands and directions without difficulty   Roll Left and Right   Type of Assistance Needed Independent   Physical Assistance Level No physical assistance   Roll Left and Right CARE Score 6   Sit to Lying   Type of Assistance Needed Independent   Physical Assistance Level No physical assistance   Sit to Lying CARE Score 6   Lying to Sitting on Side of Bed   Type of Assistance Needed Independent   Physical Assistance Level No physical assistance   Lying to Sitting on Side of Bed CARE Score 6   Sit to Stand   Type of Assistance Needed Incidental touching   Physical Assistance Level 25% or less   Comment CG/min A   Sit to Stand CARE Score 3   Bed-Chair Transfer   Type of Assistance Needed Adaptive equipment   Physical Assistance Level No physical assistance   Comment slide board   Chair/Bed-to-Chair Transfer CARE Score 6   Transfer Bed/Chair/Wheelchair   Limitations Noted In Balance;Confidence; Endurance;Pain Management;Sensation; Sequencing;UE Strength;LE Strength   Adaptive Equipment Roller Walker;Transfer Board   Sit Pivot Supervision   Stand Pivot Supervision   Sit to [employer]   Stand to [facility] Guard   Supine to Sit Modified Independent   Sit to Supine Modified Independent   Walk 10 Feet   Type of Assistance Needed Physical assistance   Physical Assistance Level 26%-50%   Comment min A   Walk 10 Feet CARE Score 3   Walk 50 Feet with Two Turns   Type of Assistance Needed Physical assistance   Physical Assistance Level 26%-50%   Comment min A   Walk 50 Feet with Two Turns CARE Score 3 Ambulation   Does the patient walk? 2  Yes   Primary Mode of Locomotion Prior to Admission Walk   Distance Walked (feet) 59 ft  (67,51,61)   Assist Device Roller Walker   Gait Pattern Antalgic; Inconsistant Cecelia; Slow Cecelia;Decreased foot clearance;Narrow DEACON;Step to   Limitations Noted In Balance; Coordination; Endurance; Heel Strike;Midline Orientation;Posture; Safety; Sensation; Sequencing;Speed;Strength;Swing   Provided Assistance with: Balance;Direction   Walk Assist Level Minimum Assist   Wheel 50 Feet with Two Turns   Type of Assistance Needed Supervision   Physical Assistance Level No physical assistance   Wheel 50 Feet with Two Turns CARE Score 4   Wheelchair mobility   Does the patient use a wheelchair? 1  Yes   Type of Wheelchair Used 1  Manual   Method Left upper extremity;Right upper extremity   Assistance Provided For Locking Brakes   Distance Level Surface (feet) 85 ft   Curb or Single Stair   Reason if not Attempted Safety concerns   1 Step (Curb) CARE Score 88   Therapeutic Interventions   Strengthening standing ther ex, seated ther ex   Balance standing ther ex   Other prosthetic training   Assessment   Treatment Assessment Pt having prosthetic fitting at 9 with Cesar Oneil from [facility], pt has adjustment with prosthetic and MAFO fitting for L foot drop and eversion  Pt able to amb with prosthetic and MAFO with Min a and RW for 59', 26', and 20', needing cuing for small steps, wider DEACON and walker advancement  Pt showing improvements from last sessoin with amb and L footdrop with eversion, less pain in R stump with new adjustments  After evaluation from othrotist, pt will benefit from custom MAFO to promote proper sequencing during ambulation  Script has been written by PA for outpatient fitting  Pt seated in WC to end sessoin with call bell in reach and stump  applied with Min A  Pt will benefit from cotinued skilled PT to improve prosthetic gait training and over strength and endurance      PT Barriers   Physical Impairment Decreased strength;Decreased range of motion;Decreased endurance; Impaired balance;Decreased mobility; Decreased safety awareness   Functional Limitation Ramp negotiation;Stair negotiation;Standing;Transfers; Walking; Wheelchair management   Plan   Treatment/Interventions Functional transfer training;LE strengthening/ROM; Elevations; Therapeutic exercise; Endurance training;Bed mobility;Gait training;Spoke to MD;Spoke to nursing;Spoke to case management   Progress Progressing toward goals   PT Therapy Minutes   PT Time In 0900   PT Time Out 1030   PT Total Time (minutes) 90   PT Mode of treatment - Individual (minutes) 90   PT Mode of treatment - Concurrent (minutes) 0   PT Mode of treatment - Group (minutes) 0   PT Mode of treatment - Co-treat (minutes) 0   PT Mode of Treatment - Total time(minutes) 90 minutes   PT Cumulative Minutes 455   Therapy Time missed   Time missed?  No

## 2021-06-17 NOTE — PROGRESS NOTES
06/16/21 1930   Charting Type   Charting Type Shift assessment   Neurological   Neuro (WDL) WDL   Level of Consciousness Alert/awake   Orientation Level Oriented X4   Cognition Appropriate judgement; Appropriate safety awareness   Speech Clear; Appropriate for developmental age   [de-identified] Able to swallow solids and liquids without difficulty   Muscle Function/Sensation Assessment Muscle strength;Sensation   RUE Sensation Numbness;Tingling   RUE Muscle Strength 4- Movement against gravity and limited resistance   LUE Sensation Full sensation   LUE Muscle Strength 5- Normal strength   RLE Sensation Pain   RLE Muscle Strength 4- Movement against gravity and limited resistance   LLE Sensation Numbness   LLE Muscle Strength 4- Movement against gravity and limited resistance   Neuro Symptoms Fatigue   Relieved by Rest   Neuro Additional Assessments No   Respiratory   Respiratory (WDL) WDL   Cardiac   Cardiac (WDL) WDL   Pain Assessment   Pain Assessment Tool 0-10   Pain Score 4   Peripheral Vascular   Peripheral Vascular (WDL) X   Cyanosis None   Capillary Refill Sluggish   Integumentary   Integumentary (WDL) X   Skin Color Appropriate for ethnicity   Skin Condition/Temp Warm;Dry   Skin Integrity Bruising   Skin Location scattered   Skin Turgor Tenting   Integumentary Additional Assessments No   Trey Scale   Sensory Perceptions 3   Moisture 4   Activity 3   Mobility 3   Nutrition 3   Friction and Shear 2   Trey Scale Score 18   Wound 01/12/21 Pressure Injury Buttocks Mid;Right   Date First Assessed/Time First Assessed: 01/12/21 0300   Pre-Existing Wound: Yes  Primary Wound Type: Pressure Injury  Location: (c) Buttocks  Wound Location Orientation: Mid;Right   Renuka-wound Assessment [place]   Dressing Foam, Silicon (eg  Allevyn, etc)   Dressing Status Clean;Dry; Intact   Wound 01/14/21 Thigh Right   Date First Assessed/Time First Assessed: 01/14/21 1417   Location: Thigh  Wound Location Orientation: Right  Wound Description (Comments): RIGHT LEG AKA WOUND NOTED, WOUND PACKED  Incision's 1st Dressing: KERLIX (x1), DRESSING SURGIPAD 5 X 9 IN STRL (x3)   Wound Site Closure Open to air   Musculoskeletal   Musculoskeletal (WDL) X   RUE Limited range of motion   RLE Other (Comment)  (R AKA)   LLE Limited range of motion   Musculoskeletal Additional Assessments No   Level of Assistance Maximum assist, patient does 25-49%   Assistive Device Wheelchair   Gastrointestinal   Gastrointestinal (WDL) WDL   Last BM Date 06/16/21   Bowel Shift Assessment   Assistance Needed Stool softener   Bowel Incontinence No   Bowel Management Minimal assistance   Bowel Management Deficit Help pull up; Help pull down;Supervision/safety   Stool Assessment   Stool Appearance   (not seen)   Genitourinary   Genitourinary (WDL) WDL   Bladder Shift Assessment   Bladder Device Urinal   Bladder Incontinence No   Bladder Management Minimal assistance   Bladder Management Deficit Emptied by staff

## 2021-06-17 NOTE — PROGRESS NOTES
06/17/21 0655   Pain Assessment   Pain Assessment Tool 0-10   Pain Score 2   Pain Location/Orientation   (Left ankle and right upper leg)   Restrictions/Precautions   Precautions Fall Risk   Weight Bearing Restrictions Yes   RLE Weight Bearing Per Order NWB   Oral Hygiene   Comment Pt reports he will do after breakfast   Grooming   Able To Comb/Brush Hair;Wash/Dry Face;Wash/Dry Hands   Findings WC level   Shower/Bathe Self   Type of Assistance Needed Physical assistance   Physical Assistance Level 26%-50%   Comment Min A, buttock hygiene   Shower/Bathe Self CARE Score 3   Bathing   Assessed Bath Style Sponge Bath   Anticipated D/C Bath Style Sponge Bath   Able to [employer] No   Able to [employer] Temperature Yes   Able to Wash/Rinse/Dry (body part) Left Arm;Right Arm;L Upper Leg;R Upper Leg;L Lower Leg/Foot;R Lower Leg/Foot;Chest;Abdomen;Perineal Area   Limitations Noted in ROM; Strength;Balance   Positioning Seated   Findings  min A for buttock hygiene   Upper Body Dressing   Type of Assistance Needed Independent   Physical Assistance Level No physical assistance   Comment WC level retireved clothing brought to sink side    Upper Body Dressing CARE Score 6   Lower Body Dressing   Type of Assistance Needed Physical assistance   Physical Assistance Level 26%-50%   Comment Min A, to pull up pants    Lower Body Dressing CARE Score 3   Putting On/Taking Off Footwear   Type of Assistance Needed Set-up / clean-up   Physical Assistance Level No physical assistance   Putting On/Taking Off Footwear CARE Score 5   Dressing/Undressing Clothing   Able to  [address]   Remove LB Clothes Shorts;Socks   Don LB Clothes Shorts;Socks; Shoes   Amount of Physical Assistance Provided 25%-49%   Limitations Noted In Coordination; Endurance;Balance;Strength   [employer] Equipment Sock Aide   Findings Min A for Shorts, Adapt   Equip for Socks and compression sock Lying to Sitting on Side of Bed   Type of Assistance Needed Independent   Lying to Sitting on Side of Bed CARE Score 6   Bed-Chair Transfer   Type of Assistance Needed Adaptive equipment;Supervision   Physical Assistance Level No physical assistance   Comment Slide Board   Chair/Bed-to-Chair Transfer CARE Score 4   Transfer Bed/Chair/Wheelchair   Limitations Noted In Balance;Confidence   Adaptive Equipment Transfer Board   Toilet Transfer   Type of Assistance Needed Supervision   Physical Assistance Level No physical assistance   Comment used grab bar and bars on commode   Toilet Transfer CARE Score 4   Toilet Transfer   Surface Assessed Platform Commode   Transfer Technique Slide Board   Limitations Noted In Confidence;Balance   [address] [facility] Wheelchair   [employer] Level of Assistance Minimum assistance   [employer] Housekeeping Pt helped to remove fitted sheet and remake bed   Coordination   Fine Motor Putting on and taking off clothespins with differing resistance    Cognition   Overall Cognitive Status WFL   Orientation Level Oriented X4   Activity Tolerance   Activity Tolerance Patient limited by fatigue   Assessment   Treatment Assessment Pt participated in ADLs and used functional mobility to gather his own clothing  The session focused on functional transfers on and off the platform commode  Pt transfered on and off using slide board from his left and right side at [facility] level  Pt demonstrated good problem solving skills for safe transfers and ability to manipulate all parts of equipment required for activity  Pt disscussed difficulty with getting in and out of car, Pt educated to different strategies and tools to help aide in transfer   Pt's prosthetic is being evaluated, treatment will progress toward including prosthetic in transfers based on eval  Pt participated in 44 minutes of concurrent therapy to address similar goals with a Pt with similar deficits  Pt is progressing well  It is recommended that he continue skilled OT intervention to continue progress toward LTG and increased functional independence  Prognosis Good   Problem List Decreased strength;Decreased safety awareness   Plan   Treatment/Interventions ADL retraining;Functional transfer training; Therapeutic exercise; Endurance training;Equipment eval/education; Compensatory technique education;OT   Progress Progressing toward goals   OT Therapy Minutes   OT Time In 0655   OT Time Out 0830   OT Total Time (minutes) 95   OT Mode of treatment - Individual (minutes) 51   OT Mode of treatment - Concurrent (minutes) 44   OT Mode of treatment - Group (minutes) 0   OT Mode of treatment - Co-treat (minutes) 0   OT Mode of Treatment - Total time(minutes) 95 minutes   OT Cumulative Minutes 418   Therapy Time missed   Time missed?  No

## 2021-06-17 NOTE — CASE MANAGEMENT
Tx team recommendations reviewed with patient & nephew, who expressed understanding & agreement  Target DC date is 6/25 with C (Nsg, PT, OT); a list of providers was provided to Pt & a referral will be made based on Pt preference  SW will continue to monitor & assist as needed with Tx & DC planning

## 2021-06-17 NOTE — CONSULTS
Consultation - Orthopedics   Kelly Kapoor 59 y o  male MRN: [id number]  Unit/Bed#: [facility] 222-01 Encounter: [id number]      Assessment/Plan     Assessment:  Status post right above knee amputation    Plan:  No evidence of osteomyelitis  No restrictions  Continue current regimen  Follow-up as needed  History of Present Illness   Physician Requesting Consult: Kimberli Newell MD  Reason for Consult / Principal Problem:  Right above knee amputation, fall, questionable osteomyelitis  HPI: Kelly Kapoor is a 59y o  year old male who presents with pain in his right stump following a fall  He states he had a fall approximately three weeks ago, landing onto the end of his right stump  His amputation was in April  He has been using a stump   He states over the last two weeks he has had progressive improvement in the pain in the swelling  Initially he had some redness around the area but that is resolved now  He feels that he continues to make improvement  He denies having any issues with wound healing following the initial surgery  Inpatient consult to Orthopedic Surgery  Consult performed by: Lennox Cleaning MD  Consult ordered by: Cinda Calderon PA-C          Review of Systems   Constitutional: Negative  HENT: Negative  Eyes: Negative  Respiratory: Negative  Cardiovascular: Negative  Gastrointestinal: Negative  Endocrine: Negative  Genitourinary: Negative  Musculoskeletal: Positive for arthralgias  Skin: Negative  Allergic/Immunologic: Negative  Neurological: Negative  Hematological: Negative  Psychiatric/Behavioral: Negative          Historical Information   Past Medical History:   Diagnosis Date    Diabetes mellitus ([facility] )     GERD (gastroesophageal reflux disease)     Hypercholesteremia     Hypertension     Ischemia of right lower extremity with suspected rhabdomyolysis 2/6/2021    Left Hydropneumothorax 2/10/2021    Methadone use ([facility] )     Pneumonia due to COVID-19 virus 1/11/2021     Past Surgical History:   Procedure Laterality Date    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    Procedure: AMPUTATION ABOVE KNEE (AKA);   Surgeon: Laura Cruz MD;  [facility];  Service: Vascular    AMPUTATION ABOVE KNEE (AKA) Right 1/18/2021    Procedure: AMPUTATION ABOVE KNEE (AKA) FORMALIZATION,  R AKA wound washout, wound closure;  Surgeon: Laura Cruz MD;  [facility];  Service: Vascular    ARTERIOGRAM Right 1/13/2021    Procedure: ARTERIOGRAM;  Surgeon: Prince Marlon DO;  [facility];  Service: Vascular    IR CHEST TUBE PLACEMENT  2/10/2021    IR LOWER EXTREMITY ANGIOGRAM  1/14/2021    THROMBECTOMY W/ EMBOLECTOMY Right 1/13/2021    Procedure: EMBOLECTOMY/THROMBECTOMY LOWER EXTREMITY;  Surgeon: Prince Marlon DO;  [facility];  Service: Vascular     Social History   Social History     Substance and Sexual Activity   Alcohol Use Not Currently     Social History     Substance and Sexual Activity   Drug Use No    Comment: methadone     E-Cigarette/Vaping    E-Cigarette Use Never User      E-Cigarette/Vaping Substances    Nicotine No     THC No     CBD No     Flavoring No     Other No     Unknown No      Social History     Tobacco Use   Smoking Status Former Smoker   Smokeless Tobacco Never Used     Family History:   Family History   Problem Relation Age of Onset    Diabetes Mother     Hypertension Father     Leukemia Father     Acute lymphoblastic leukemia Father     Cancer Sister        Meds/Allergies   all current active meds have been reviewed and current meds:   Current Facility-Administered Medications   Medication Dose Route Frequency    acetaminophen (TYLENOL) tablet 975 mg  975 mg Oral Q8H [facility]    albuterol (PROVENTIL HFA,VENTOLIN HFA) inhaler 2 puff  2 puff Inhalation Q4H PRN    apixaban (ELIQUIS) tablet 5 mg  5 mg Oral BID    bisacodyl (DULCOLAX) rectal suppository 10 mg  10 mg Rectal Daily PRN    cholecalciferol (VITAMIN D3) tablet 2,000 Units  2,000 Units Oral Daily    Diclofenac Sodium (VOLTAREN) 1 % topical gel 4 g  4 g Topical 4x Daily PRN    docusate sodium (COLACE) capsule 100 mg  100 mg Oral BID    DULoxetine (CYMBALTA) delayed release capsule 30 mg  30 mg Oral HS    Empagliflozin TABS 25 mg  25 mg Oral QAM    fluticasone (FLONASE) 50 mcg/act nasal spray 1 spray  1 spray Nasal Daily    insulin glargine (LANTUS) subcutaneous injection 17 Units 0 17 mL  17 Units Subcutaneous HS    insulin lispro (HumaLOG) 100 units/mL subcutaneous injection 1-5 Units  1-5 Units Subcutaneous HS    insulin lispro (HumaLOG) 100 units/mL subcutaneous injection 1-5 Units  1-5 Units Subcutaneous TID AC    lidocaine (LMX) 4 % cream   Topical 4x Daily PRN    lisinopril (ZESTRIL) tablet 10 mg  10 mg Oral Daily    loratadine (CLARITIN) tablet 10 mg  10 mg Oral HS    magnesium oxide (MAG-OX) tablet 400 mg  400 mg Oral BID    methadone (DOLOPHINE) tablet 70 mg  70 mg Oral Daily    multivitamin-minerals (CENTRUM) tablet 1 tablet  1 tablet Oral Daily    pantoprazole (PROTONIX) EC tablet 20 mg  20 mg Oral Daily    pregabalin (LYRICA) capsule 75 mg  75 mg Oral TID    sitaGLIPtin-metFORMIN (JANUMET 50/1000) combo dose   Oral BID     Allergies   Allergen Reactions    Varenicline        Objective   Vitals: Blood pressure 130/68, pulse 84, temperature 97 5 °F (36 4 °C), temperature source Temporal, resp  rate 18, height 5' 8" (1 727 m), weight 73 5 kg (162 lb 0 6 oz), SpO2 94 %  ,Body mass index is 24 64 kg/m²  Intake/Output Summary (Last 24 hours) at 6/17/2021 1548  Last data filed at 6/17/2021 1300  Gross per 24 hour   Intake 970 ml   Output 650 ml   Net 320 ml     I/O last 24 hours: In: 3719 [P O :1720]  Out: 1150 [Urine:1150]    Invasive Devices     None                 Physical Exam  Vitals and nursing note reviewed  Constitutional:       Appearance: He is well-developed     HENT:      Head: Normocephalic and atraumatic  Eyes:      Conjunctiva/sclera: Conjunctivae normal    Cardiovascular:      Rate and Rhythm: Normal rate and regular rhythm  Heart sounds: No murmur heard  Pulmonary:      Effort: Pulmonary effort is normal  No respiratory distress  Breath sounds: Normal breath sounds  Abdominal:      Palpations: Abdomen is soft  Tenderness: There is no abdominal tenderness  Musculoskeletal:      Cervical back: Neck supple  Left knee: No effusion  Instability Tests: Medial Yoly test negative and lateral Yoly test negative  Skin:     General: Skin is warm and dry  Neurological:      Mental Status: He is alert  Right Knee Exam     Comments:  Patient has an above-knee amputation  Excellent hip range of motion  Slight tenderness around the incision site with no fluctuance or erythema  Patient moves and palpate the stump with no problems  No open wounds  No evidence of infection  Left Knee Exam   Left knee exam is normal     Tenderness   The patient is experiencing no tenderness  Range of Motion   The patient has normal left knee ROM      Tests   Yoly:  Medial - negative Lateral - negative  Varus: negative Valgus: negative  Lachman:  Anterior - negative      Drawer:  Posterior - negative  Pivot shift: negative  Patellar apprehension: negative    Other   Erythema: absent  Sensation: normal  Pulse: present  Swelling: none  Effusion: no effusion present          Imaging Studies: I have personally reviewed pertinent films in PACS and Shows evidence of an above knee amputation with periosteal reaction at the area of the level of the amputation Never smoker

## 2021-06-18 LAB
BASOPHILS # BLD AUTO: 0 THOUSANDS/ΜL (ref 0–0.1)
BASOPHILS NFR BLD AUTO: 1 % (ref 0–2)
EOSINOPHIL # BLD AUTO: 0.2 THOUSAND/ΜL (ref 0–0.61)
EOSINOPHIL NFR BLD AUTO: 4 % (ref 0–5)
ERYTHROCYTE [DISTWIDTH] IN BLOOD BY AUTOMATED COUNT: 15.7 % (ref 11.5–14.5)
GLUCOSE SERPL-MCNC: 115 MG/DL (ref 65–140)
GLUCOSE SERPL-MCNC: 161 MG/DL (ref 65–140)
GLUCOSE SERPL-MCNC: 167 MG/DL (ref 65–140)
GLUCOSE SERPL-MCNC: 226 MG/DL (ref 65–140)
HCT VFR BLD AUTO: 37.4 % (ref 42–47)
HGB BLD-MCNC: 12.3 G/DL (ref 14–18)
LYMPHOCYTES # BLD AUTO: 1.8 THOUSANDS/ΜL (ref 0.6–4.47)
LYMPHOCYTES NFR BLD AUTO: 31 % (ref 21–51)
MCH RBC QN AUTO: 27 PG (ref 26–34)
MCHC RBC AUTO-ENTMCNC: 33 G/DL (ref 31–37)
MCV RBC AUTO: 82 FL (ref 81–99)
MONOCYTES # BLD AUTO: 0.5 THOUSAND/ΜL (ref 0.17–1.22)
MONOCYTES NFR BLD AUTO: 9 % (ref 2–12)
NEUTROPHILS # BLD AUTO: 3.4 THOUSANDS/ΜL (ref 1.4–6.5)
NEUTS SEG NFR BLD AUTO: 56 % (ref 42–75)
PLATELET # BLD AUTO: 175 THOUSANDS/UL (ref 149–390)
PMV BLD AUTO: 9 FL (ref 8.6–11.7)
RBC # BLD AUTO: 4.56 MILLION/UL (ref 4.3–5.9)
WBC # BLD AUTO: 6 THOUSAND/UL (ref 4.8–10.8)

## 2021-06-18 PROCEDURE — 82948 REAGENT STRIP/BLOOD GLUCOSE: CPT

## 2021-06-18 PROCEDURE — 85025 COMPLETE CBC W/AUTO DIFF WBC: CPT | Performed by: PHYSICAL MEDICINE & REHABILITATION

## 2021-06-18 PROCEDURE — 97116 GAIT TRAINING THERAPY: CPT

## 2021-06-18 PROCEDURE — 97542 WHEELCHAIR MNGMENT TRAINING: CPT

## 2021-06-18 PROCEDURE — 97530 THERAPEUTIC ACTIVITIES: CPT

## 2021-06-18 PROCEDURE — 99232 SBSQ HOSP IP/OBS MODERATE 35: CPT | Performed by: PHYSICAL MEDICINE & REHABILITATION

## 2021-06-18 PROCEDURE — 97535 SELF CARE MNGMENT TRAINING: CPT

## 2021-06-18 PROCEDURE — 97110 THERAPEUTIC EXERCISES: CPT

## 2021-06-18 RX ADMIN — ACETAMINOPHEN 975 MG: 325 TABLET ORAL at 13:46

## 2021-06-18 RX ADMIN — Medication 2000 UNITS: at 08:14

## 2021-06-18 RX ADMIN — LORATADINE 10 MG: 10 TABLET ORAL at 21:51

## 2021-06-18 RX ADMIN — APIXABAN 5 MG: 5 TABLET, FILM COATED ORAL at 08:14

## 2021-06-18 RX ADMIN — METFORMIN HYDROCHLORIDE: 500 TABLET, FILM COATED ORAL at 08:13

## 2021-06-18 RX ADMIN — Medication 1 TABLET: at 08:14

## 2021-06-18 RX ADMIN — INSULIN LISPRO 1 UNITS: 100 INJECTION, SOLUTION INTRAVENOUS; SUBCUTANEOUS at 08:16

## 2021-06-18 RX ADMIN — DULOXETINE HYDROCHLORIDE 30 MG: 30 CAPSULE, DELAYED RELEASE ORAL at 21:52

## 2021-06-18 RX ADMIN — FLUTICASONE PROPIONATE 1 SPRAY: 50 SPRAY, METERED NASAL at 08:15

## 2021-06-18 RX ADMIN — PREGABALIN 75 MG: 75 CAPSULE ORAL at 08:14

## 2021-06-18 RX ADMIN — INSULIN LISPRO 2 UNITS: 100 INJECTION, SOLUTION INTRAVENOUS; SUBCUTANEOUS at 21:51

## 2021-06-18 RX ADMIN — DOCUSATE SODIUM 100 MG: 100 CAPSULE, LIQUID FILLED ORAL at 08:14

## 2021-06-18 RX ADMIN — ACETAMINOPHEN 975 MG: 325 TABLET ORAL at 21:51

## 2021-06-18 RX ADMIN — LIDOCAINE 4%: 4 CREAM TOPICAL at 13:46

## 2021-06-18 RX ADMIN — MAGNESIUM OXIDE TAB 400 MG (241.3 MG ELEMENTAL MG) 400 MG: 400 (241.3 MG) TAB at 17:32

## 2021-06-18 RX ADMIN — METHADONE HYDROCHLORIDE 70 MG: 10 TABLET ORAL at 08:14

## 2021-06-18 RX ADMIN — PREGABALIN 75 MG: 75 CAPSULE ORAL at 17:31

## 2021-06-18 RX ADMIN — MAGNESIUM OXIDE TAB 400 MG (241.3 MG ELEMENTAL MG) 400 MG: 400 (241.3 MG) TAB at 08:14

## 2021-06-18 RX ADMIN — LISINOPRIL 10 MG: 10 TABLET ORAL at 08:14

## 2021-06-18 RX ADMIN — LIDOCAINE 4%: 4 CREAM TOPICAL at 09:24

## 2021-06-18 RX ADMIN — ACETAMINOPHEN 975 MG: 325 TABLET ORAL at 05:01

## 2021-06-18 RX ADMIN — METFORMIN HYDROCHLORIDE: 500 TABLET, FILM COATED ORAL at 17:32

## 2021-06-18 RX ADMIN — DICLOFENAC SODIUM 4 G: 10 GEL TOPICAL at 21:52

## 2021-06-18 RX ADMIN — PANTOPRAZOLE SODIUM 20 MG: 20 TABLET, DELAYED RELEASE ORAL at 08:14

## 2021-06-18 RX ADMIN — INSULIN GLARGINE 17 UNITS: 100 INJECTION, SOLUTION SUBCUTANEOUS at 21:50

## 2021-06-18 RX ADMIN — INSULIN LISPRO 1 UNITS: 100 INJECTION, SOLUTION INTRAVENOUS; SUBCUTANEOUS at 17:31

## 2021-06-18 RX ADMIN — APIXABAN 5 MG: 5 TABLET, FILM COATED ORAL at 17:31

## 2021-06-18 RX ADMIN — PREGABALIN 75 MG: 75 CAPSULE ORAL at 21:52

## 2021-06-18 NOTE — PLAN OF CARE
Problem: Potential for Falls  Goal: Patient will remain free of falls  Description: INTERVENTIONS:  - Assess patient frequently for physical needs  -  Identify cognitive and physical deficits and behaviors that affect risk of falls    -  [place] fall precautions as indicated by assessment   - Educate patient/family on patient safety including physical limitations  - Instruct patient to call for assistance with activity based on assessment  - Modify environment to reduce risk of injury  - Consider OT/PT consult to assist with strengthening/mobility  Outcome: Progressing     Problem: Prexisting or High Potential for Compromised Skin Integrity  Goal: Skin integrity is maintained or improved  Description: INTERVENTIONS:  - Identify patients at risk for skin breakdown  - Assess and monitor skin integrity  - Assess and monitor nutrition and hydration status  - Monitor labs   - Assess for incontinence   - Turn and reposition patient  - Assist with mobility/ambulation  - Relieve pressure over bony prominences  - Avoid friction and shearing  - Provide appropriate hygiene as needed including keeping skin clean and dry  - Evaluate need for skin moisturizer/barrier cream  - Collaborate with interdisciplinary team   - Patient/family teaching  - Consider wound care consult   Outcome: Progressing     Problem: PAIN - ADULT  Goal: Verbalizes/displays adequate comfort level or baseline comfort level  Description: Interventions:  - Encourage patient to monitor pain and request assistance  - Assess pain using appropriate pain scale  - Administer analgesics based on type and severity of pain and evaluate response  - Implement non-pharmacological measures as appropriate and evaluate response  - Consider cultural and social influences on pain and pain management  - Notify physician/advanced practitioner if interventions unsuccessful or patient reports new pain  Outcome: Progressing     Problem: SAFETY ADULT  Goal: Patient will remain free of falls  Description: INTERVENTIONS:  - Assess patient frequently for physical needs  -  Identify cognitive and physical deficits and behaviors that affect risk of falls    -  [place] fall precautions as indicated by assessment   - Educate patient/family on patient safety including physical limitations  - Instruct patient to call for assistance with activity based on assessment  - Modify environment to reduce risk of injury  - Consider OT/PT consult to assist with strengthening/mobility  Outcome: Progressing  Goal: Maintain or return to baseline ADL function  Description: INTERVENTIONS:  -  Assess patient's ability to carry out ADLs; assess patient's baseline for ADL function and identify physical deficits which impact ability to perform ADLs (bathing, care of mouth/teeth, toileting, grooming, dressing, etc )  - Assess/evaluate cause of self-care deficits   - Assess range of motion  - Assess patient's mobility; develop plan if impaired  - Assess patient's need for assistive devices and provide as appropriate  - Encourage maximum independence but intervene and supervise when necessary  - Involve family in performance of ADLs  - Assess for home care needs following discharge   - Consider OT consult to assist with ADL evaluation and planning for discharge  - Provide patient education as appropriate  Outcome: Progressing  Goal: Maintain or return mobility status to optimal level  Description: INTERVENTIONS:  - Assess patient's baseline mobility status (ambulation, transfers, stairs, etc )    - Identify cognitive and physical deficits and behaviors that affect mobility  - Identify mobility aids required to assist with transfers and/or ambulation (gait belt, sit-to-stand, lift, walker, cane, etc )  - [place] fall precautions as indicated by assessment  - Record patient progress and toleration of activity level on Mobility SBAR; progress patient to next Phase/Stage  - Instruct patient to call for assistance with activity based on assessment  - Consider rehabilitation consult to assist with strengthening/weightbearing, etc   Outcome: Progressing     Problem: DISCHARGE PLANNING  Goal: Discharge to home or other facility with appropriate resources  Description: INTERVENTIONS:  - Identify barriers to discharge w/patient and caregiver  - Arrange for needed discharge resources and transportation as appropriate  - Identify discharge learning needs (meds, wound care, etc )  - Arrange for interpretive services to assist at discharge as needed  - Refer to Case Management Department for coordinating discharge planning if the patient needs post-hospital services based on physician/advanced practitioner order or complex needs related to functional status, cognitive ability, or social support system  Outcome: Progressing     Problem: SAFETY ADULT  Goal: Patient will remain free of falls  Description: INTERVENTIONS:  - Assess patient frequently for physical needs  -  Identify cognitive and physical deficits and behaviors that affect risk of falls    -  [place] fall precautions as indicated by assessment   - Educate patient/family on patient safety including physical limitations  - Instruct patient to call for assistance with activity based on assessment  - Modify environment to reduce risk of injury  - Consider OT/PT consult to assist with strengthening/mobility  Outcome: Progressing  Goal: Maintain or return to baseline ADL function  Description: INTERVENTIONS:  -  Assess patient's ability to carry out ADLs; assess patient's baseline for ADL function and identify physical deficits which impact ability to perform ADLs (bathing, care of mouth/teeth, toileting, grooming, dressing, etc )  - Assess/evaluate cause of self-care deficits   - Assess range of motion  - Assess patient's mobility; develop plan if impaired  - Assess patient's need for assistive devices and provide as appropriate  - Encourage maximum independence but intervene and supervise when necessary  - Involve family in performance of ADLs  - Assess for home care needs following discharge   - Consider OT consult to assist with ADL evaluation and planning for discharge  - Provide patient education as appropriate  Outcome: Progressing  Goal: Maintain or return mobility status to optimal level  Description: INTERVENTIONS:  - Assess patient's baseline mobility status (ambulation, transfers, stairs, etc )    - Identify cognitive and physical deficits and behaviors that affect mobility  - Identify mobility aids required to assist with transfers and/or ambulation (gait belt, sit-to-stand, lift, walker, cane, etc )  - [place] fall precautions as indicated by assessment  - Record patient progress and toleration of activity level on Mobility SBAR; progress patient to next Phase/Stage  - Instruct patient to call for assistance with activity based on assessment  - Consider rehabilitation consult to assist with strengthening/weightbearing, etc   Outcome: Progressing     Problem: Knowledge Deficit  Goal: Patient/family/caregiver demonstrates understanding of disease process, treatment plan, medications, and discharge instructions  Description: Complete learning assessment and assess knowledge base    Interventions:  - Provide teaching at level of understanding  - Provide teaching via preferred learning methods  Outcome: Progressing     Problem: SKIN/TISSUE INTEGRITY - ADULT  Goal: Skin integrity remains intact  Description: INTERVENTIONS  - Identify patients at risk for skin breakdown  - Assess and monitor skin integrity  - Assess and monitor nutrition and hydration status  - Monitor labs (i e  albumin)  - Assess for incontinence   - Turn and reposition patient  - Assist with mobility/ambulation  - Relieve pressure over bony prominences  - Avoid friction and shearing  - Provide appropriate hygiene as needed including keeping skin clean and dry  - Evaluate need for skin moisturizer/barrier cream  - Collaborate with interdisciplinary team (i e  Nutrition, Rehabilitation, etc )   - Patient/family teaching  Outcome: Progressing  Goal: Incision(s), wounds(s) or drain site(s) healing without S/S of infection  Description: INTERVENTIONS  - Assess and document risk factors for skin impairment   - Assess and document dressing, incision, wound bed, drain sites and surrounding tissue  - Consider nutrition services referral as needed  - Oral mucous membranes remain intact  - Provide patient/ family education  Outcome: Progressing     Problem: MUSCULOSKELETAL - ADULT  Goal: Maintain or return mobility to safest level of function  Description: INTERVENTIONS:  - Assess patient's ability to carry out ADLs; assess patient's baseline for ADL function and identify physical deficits which impact ability to perform ADLs (bathing, care of mouth/teeth, toileting, grooming, dressing, etc )  - Assess/evaluate cause of self-care deficits   - Assess range of motion  - Assess patient's mobility  - Assess patient's need for assistive devices and provide as appropriate  - Encourage maximum independence but intervene and supervise when necessary  - Involve family in performance of ADLs  - Assess for home care needs following discharge   - Consider OT consult to assist with ADL evaluation and planning for discharge  - Provide patient education as appropriate  Outcome: Progressing  Goal: Maintain proper alignment of affected body part  Description: INTERVENTIONS:  - Support, maintain and protect limb and body alignment  - Provide patient/ family with appropriate education  Outcome: Progressing

## 2021-06-18 NOTE — ASSESSMENT & PLAN NOTE
Lab Results   Component Value Date    HGBA1C 7 7 (H) 05/29/2021       Recent Labs     06/17/21  1145 06/17/21  1642 06/17/21 2031 06/18/21  0747   POCGLU 144* 148* 180* 161*       Blood Sugar Average: Last 72 hrs:  (P) 162 [id number] lantus 17 units HS, Janumet 50/1000 BID  Monitor accu-cheks daily  Hypoglycemia protocol in place

## 2021-06-18 NOTE — NURSING NOTE
Pt resting in bed  Slept well during the night  No changes overnight  Urinal at bedside  Assessment otherwise unchanged  Continuing to monitor and follow plan of care  All items within reach and bed alarm intact for safety

## 2021-06-18 NOTE — PROGRESS NOTES
OT Daily Progress       06/18/21 0900   Pain Assessment   Pain Assessment Tool Pain Assessment not indicated - pt denies pain   Pain Score No Pain   Restrictions/Precautions   Precautions Fall Risk   Weight Bearing Restrictions Yes   RLE Weight Bearing Per Order NWB   Lying to Sitting on Side of Bed   Type of Assistance Needed Independent   Physical Assistance Level No physical assistance   Lying to Sitting on Side of Bed CARE Score 6   Sit to Stand   Type of Assistance Needed Incidental touching   Physical Assistance Level 25% or less   Comment CG A    Sit to Stand CARE Score 3   Bed-Chair Transfer   Type of Assistance Needed Adaptive equipment   Physical Assistance Level No physical assistance   Comment slide board   Chair/Bed-to-Chair Transfer CARE Score 6   Transfer Bed/Chair/Wheelchair   Limitations Noted In Balance; Endurance   Therapeutic Exercise - ROM   UE-ROM Yes   Therapeutic Excerise-Strength   UE Strength Yes   Cognition   Overall Cognitive Status WFL   Arousal/Participation Alert; Responsive; Cooperative   Attention Within functional limits   Following Commands Follows one step commands without difficulty   Activity Tolerance   Activity Tolerance Patient tolerated treatment well;Patient limited by fatigue   OT Therapy Minutes   OT Time In 0900   OT Time Out 1030   OT Total Time (minutes) 90   OT Mode of treatment - Individual (minutes) 90     Pt seen for follow up visit  Pt rec'd in bed, awake, alert  Agreeable to OT  Awaiting lidocaine cream from RN  Sup to EOB independent  Donns  to R LE at supervision level at EOB  Unable to camille prosthetic - terminates task when RN arrives for med delivery  Max A to camille L MAFO, although able to camille tubigrip and sock at supervision level at EOB  Slide board transfer to wheelchair at supervision level  Able to set up slide board without difficulty  Wheelchair from pt room to OT gym independent    UE therapeutic exercises focusing on endurance for ADL and transfer tasks  Arm bike 4 mins forward and back with rest break between sets  3# gripper with 10 second hold x 20 reps each hand  Green T putty for object retrieval and [facility]  Red T bar 30 reps flex/ext  Wheelchair mobility to PT gym independent  Left with PT at end of session  Tolerating well  Improving act nataliya, reports increased comfort and ease of donning   Continue to progress treatment as status allows

## 2021-06-18 NOTE — ASSESSMENT & PLAN NOTE
Acute chomprehensive interdisciplinary inpatient rehabilitation to include intensive skilled therapies as outlines with oversight and management by a rehabilitation Physician Assistant overseen by rehabilitation physician as well as inpatient rehabilitation nursing, case management and weekly interdisciplinary team meeting  Ecchymosis noted to stump after prosthesis usage  Patient states he is having difficulty sleeping with the pain  Will continue tylenol and methadone  Encourage ice  Prosthesis more comfortable now, able to ambulate with prosthesis better   MAFO added to LLE

## 2021-06-18 NOTE — PROGRESS NOTES
[facility]  Progress Note [place] 1960, 59 y o  male MRN: [id number]  Unit/Bed#: -01 Encounter: [id number]  Primary Care Provider: VERONICA Edmonds   Date and time admitted to hospital: 6/10/2021 11:44 AM     HPI: Marian Campos a 59 y  o  male who presented to the [address] [place] [address] for acute rehabilitation services on 6/10/21  Prior to this admission the patient required an emergent right AKA secondary to ischemia of RLE  A suspected embolism and aortic thrombus  Surgery was performed on 1/14/21 bu Dr Rosa M Nettles  He discharged to acute rehab on 2/25/21  From [facility] he readmitted back to [address] [place] [address] on 2/25/21 with increased SOB and hypoxia  He was discharged back to [place] acute rehab on 3/3/21  From [place] he discharged home  Pt received his prosthesis around 5/31 from [facility]  Patient accepted at [address] [place] [address] for prosthesis training secondary to right AKA  He has a PMH significant for right femoral DVT, anemia, aortic thrombus, Type 2 diabetes, Left hydropneumax, GERD, HTN, hypercholesterolemia, long-term methadone use due to chronic low back pain and cervical radiculopathy  Subjective:  No complaints  States the pain is starting to recede in the right stump  He states the lidocaine cream is helping the pain as well  The prosthesis feels better and he feels the [facility] to the left ankle is helping while he walks  ROS: A 10 point ROS was performed; negative except as noted above         Assessment/Plan:    Chronic bilateral low back pain  Assessment & Plan  Continue methadone 70 mg    Cervical radiculopathy  Assessment & Plan  Continue methadone 70 mg    Type 2 diabetes mellitus, with long-term current use of insulin [facility])  Assessment & Plan  Lab Results   Component Value Date    HGBA1C 7 7 (H) 05/29/2021       Recent Labs     06/17/21  1145 06/17/21  1642 06/17/21 2031 06/18/21  0747   POCGLU 144* 148* 180* 161*       Blood Sugar Average: Last 72 hrs:  (P) 162 [id number] lantus 17 units HS, Janumet 50/1000 BID  Monitor accu-cheks daily  Hypoglycemia protocol in place      Vitamin D deficiency  Assessment & Plan  Continue cholecalciferol 1000 units    Methadone maintenance therapy patient [facility])  Assessment & Plan  Patient with chronic low back pain and cervical radiculopathy  Continue naloxone as needed      Essential hypertension  Assessment & Plan  Continue lisinopril 10 mg daily  Monitor vitals    * S/P AKA (above knee amputation), right [facility])  Assessment & Plan  Acute chomprehensive interdisciplinary inpatient rehabilitation to include intensive skilled therapies as outlines with oversight and management by a rehabilitation Physician Assistant overseen by rehabilitation physician as well as inpatient rehabilitation nursing, case management and weekly interdisciplinary team meeting  Ecchymosis noted to stump after prosthesis usage  Patient states he is having difficulty sleeping with the pain  Will continue tylenol and methadone  Encourage ice  Prosthesis more comfortable now, able to ambulate with prosthesis better  MAFO added to LLE         Orthopedics saw patient  No evidence of osteomyelitis from their perspective  No treatment needed       Scheduled Meds:  Current Facility-Administered Medications   Medication Dose Route Frequency Provider Last Rate    acetaminophen  975 mg Oral [facility] Willy Smyth MD      albuterol  2 puff Inhalation Q4H PRN Madison Lopez PA-C      apixaban  5 mg Oral BID Madison Lopez PA-C      bisacodyl  10 mg Rectal Daily PRN Madison Lopez PA-C      cholecalciferol  2,000 Units Oral Daily Madison Lopez PA-C      Diclofenac Sodium  4 g Topical 4x Daily PRN Madison Lopez PA-C      docusate sodium  100 mg Oral BID Madison Lopez PA-C      DULoxetine  30 mg Oral HS Madison Lopez PA-C      Empagliflozin  25 mg Oral QAM Madison Lopez PA-C  fluticasone  1 spray Nasal Daily Madison Lopez, JOSE      insulin glargine  17 Units Subcutaneous HS Madison Lopez, PA-ELSIE      insulin lispro  1-5 Units Subcutaneous HS Diana Ny, JOSE      insulin lispro  1-5 Units Subcutaneous TID AC Diana Ceron, JOSE      lidocaine   Topical 4x Daily PRN Madison Loepz, PA-ELSIE      lisinopril  10 mg Oral Daily Madison Lopez, PA-ELSIE      loratadine  10 mg Oral HS Madison Lopez, PA-ELSIE      magnesium oxide  400 mg Oral BID Madison Lopez, PA-C      methadone  70 mg Oral Daily Madison Lopez, PA-C      multivitamin-minerals  1 tablet Oral Daily Madison Lopez, PA-C      pantoprazole  20 mg Oral Daily Madison Lopez, PA-ELSIE      pregabalin  75 mg Oral TID Madison Lopez, JOSE      sitaGLIPtin-metFORMIN (JANUMET 50/1000) combo dose   Oral BID Madison Lopez, JOSE         Objective:  Restrictions include:  right lower extremity with new prosthesis Fall precautions      Functional History - Prior to Admission:      Functional Status: Needed prior assistance with self care, indoor mobility  His cognition he is indepdent   Patient lives in a multilevel home     Functional Status      Physical Therapy:     Weight Bearing Status: Full Weight Bearing  Transfers: Supervision  Bed Mobility: Supervision  Amulation Distance (ft): 72 feet  Ambulation: Incidental Touching  Assistive Device for Ambulation: Roller Walker  Wheelchair Mobility Distance: 300 ft  Wheelchair Mobility: Supervision  Number of Stairs: 0  Discharge Recommendations: [facility]:  [address] [facility] with[de-identified] Family Support, First Floor Setup, Home Physical Therapy  Occupational Therapy:  Eating: Supervision  Grooming: Supervision  Bathing: Minimal Assistance  Bathing: Minimal Assistance  Upper Body Dressing: Supervision  Lower Body Dressing: Minimal Assistance  Toileting:  (TBA)  Tub/Shower Transfer:  (TBA)  Toilet Transfer: Incidental Touching  Cognition: Within Defined Limits  Orientation: Person, Place, Time, Situation  Discharge Recommendations: Home with:  [address] with[de-identified] Family Support, Home Occupational Therapy        Physical Exam:  Temp:  [97 °F (36 1 °C)-97 4 °F (36 3 °C)] 97 4 °F (36 3 °C)  HR:  [75-82] 82  Resp:  [18] 18  BP: (102-139)/(56-65) 139/65  SpO2:  [94 %-96 %] 96 %    General: alert, no apparent distress, cooperative and comfortable  HEENT:  Head: Normocephalic, no lesions, without obvious abnormality  Eye: Normal external eye, conjunctiva, lidsc cornea  Ears: Normal external ears  Nose: Normal external nose, mucus membranes  CARDIAC:  regular rate and rhythm, S1, S2 normal, no murmur, click, rub or gallop  LUNGS:  no abnormal respiratory pattern, no retractions noted, non-labored breathing   ABDOMEN:  soft, non-tender, non-distended  EXTREMITIES:  extremities normal, warm and well-perfused; no cyanosis, clubbing, or edema  NEURO:  clear speech, following all commands, oriented x4  PSYCH:  Alert and oriented, appropriate affect  Diagnostic Studies: Labs reviewed  XR femur 2 vw right   Final Result by Taqueria Capellan MD (06/16 1609)      No acute osseous abnormality  Cannot exclude early osteomyelitis  Follow as clinically indicated  The study was marked in [facility] for immediate notification  Workstation performed: [id number]             Laboratory: Labs reviewed  Results from last 7 days   Lab Units 06/18/21  0601 06/15/21  0547   HEMOGLOBIN g/dL 12 3* 12 4*   HEMATOCRIT % 37 4* 37 5*   WBC Thousand/uL 6 00 6 30     Results from last 7 days   Lab Units 06/15/21  0547   BUN mg/dL 21   SODIUM mmol/L 136   POTASSIUM mmol/L 4 5   CHLORIDE mmol/L 98   CREATININE mg/dL 0 80   AST U/L 26   ALT U/L 24            ** Please Note: Fluency Direct voice to text software may have been used in the creation of this document   **

## 2021-06-18 NOTE — PROGRESS NOTES
06/18/21 1030   Pain Assessment   Pain Assessment Tool 0-10   Pain Score 5   Pain Location/Orientation Orientation: Right;Location: Leg  (thigh)   Restrictions/Precautions   Precautions Fall Risk   Weight Bearing Restrictions No   Braces or Orthoses MAFO  (LLE MAFO)   Cognition   Overall Cognitive Status WFL   Arousal/Participation Alert; Responsive; Cooperative   Attention Within functional limits   Orientation Level Oriented X4   Memory Within functional limits   Following Commands Follows all commands and directions without difficulty   Subjective   Subjective Pt states the prothsetic is fitting much better now and easier to get off  Roll Left and Right   Type of Assistance Needed Independent   Physical Assistance Level No physical assistance   Roll Left and Right CARE Score 6   Sit to Lying   Type of Assistance Needed Independent   Physical Assistance Level No physical assistance   Sit to Lying CARE Score 6   Lying to Sitting on Side of Bed   Type of Assistance Needed Independent   Physical Assistance Level No physical assistance   Lying to Sitting on Side of Bed CARE Score 6   Sit to Stand   Type of Assistance Needed Supervision   Physical Assistance Level 25% or less   Comment CG/S   Sit to Stand CARE Score 3   Bed-Chair Transfer   Type of Assistance Needed Supervision   Physical Assistance Level No physical assistance   Comment VC for placement of slideboard   Chair/Bed-to-Chair Transfer CARE Score 4   Transfer Bed/Chair/Wheelchair   Sit Pivot Supervision   Stand Pivot Supervision   Sit to Stand Supervision   Stand to Sit Supervision   Supine to Sit Modified Independent   Sit to Supine Modified Independent   Walk 10 Feet   Type of Assistance Needed Incidental touching   Physical Assistance Level 25% or less   Comment CG   Walk 10 Feet CARE Score 3   Ambulation   Does the patient walk? 2   Yes   Primary Mode of Locomotion Prior to Admission Walk   Distance Walked (feet) 45 ft  (38, 40, 45)   Assist Device Roller Walker   Gait Pattern Antalgic; Inconsistant Cecelia; Slow Cecelia;Decreased foot clearance; Forward Flexion;Narrow DEACON;Step to   Limitations Noted In Balance; Coordination;Device Management; Endurance; Heel Strike;Midline Orientation; Safety; Sequencing;Speed;Strength   Provided Assistance with: Balance;Direction;Limb Advancement;Trunk Support;Weight Shift   Walk Assist Level Contact Guard   Findings Pt neeing cuing for limb advancement, proper sequencing, L>R advancement, walker advancement   Wheel 50 Feet with Two Turns   Type of Assistance Needed Independent   Physical Assistance Level No physical assistance   Wheel 50 Feet with Two Turns CARE Score 6   Wheelchair mobility   Does the patient use a wheelchair? 1  Yes   Type of Wheelchair Used 1  Manual   Method Right upper extremity; Left upper extremity   Assistance Provided For Locking Brakes   Distance Level Surface (feet) 146 ft   Findings Mod I   Curb or Single Stair   Reason if not Attempted Safety concerns   1 Step (Curb) CARE Score 88   Therapeutic Interventions   Strengthening supine, seated, standing ther ex   Balance standing ther ex   Other Prosthetic training, gait, transfer   Assessment   Treatment Assessment Pt continues to show progression with prosthetic application  LLE MAFO applied, continues to show improvement with L foot drop and eversion  Pt states no complicatoins with MAFO and is happy with adjustments of prosthetic  Pt able to propell self 146' Mod I  Able to transfer self with S and cuing for placement of transfer board, working towards Mod I for functional mobility at home  Performed all seated, supine and standing ther ex with no complicatoin  Pt able to don prosthetic with cuing for socket placement and locking mechanism  Pt able to amb with RW and CG 45',40', and 38', needing cuing for proper seqencing, L>R , wider DEACON and walker advancement  Pt does perform normal gait for a few trials and needing reminders when becoming fatigued   Pt seated in WC with stump left to open air for application of lidocane lotion, nursing to apply stump  afterwards  Pt will benefit from continued skilled PT to work towards stairs and proper gait with ambulation  PT Barriers   Physical Impairment Decreased strength;Decreased range of motion;Decreased endurance; Impaired balance;Decreased mobility; Decreased safety awareness   Functional Limitation Walking;Transfers;Standing;Stair negotiation;Ramp negotiation   Plan   Treatment/Interventions Functional transfer training;LE strengthening/ROM; Elevations; Therapeutic exercise; Endurance training;Bed mobility;Gait training   Progress Progressing toward goals   PT Therapy Minutes   PT Time In 1030   PT Time Out 1200   PT Total Time (minutes) 90   PT Mode of treatment - Individual (minutes) 90   PT Mode of treatment - Concurrent (minutes) 0   PT Mode of treatment - Group (minutes) 0   PT Mode of treatment - Co-treat (minutes) 0   PT Mode of Treatment - Total time(minutes) 90 minutes   PT Cumulative Minutes 545   Therapy Time missed   Time missed?  No

## 2021-06-19 LAB
GLUCOSE SERPL-MCNC: 125 MG/DL (ref 65–140)
GLUCOSE SERPL-MCNC: 165 MG/DL (ref 65–140)
GLUCOSE SERPL-MCNC: 166 MG/DL (ref 65–140)
GLUCOSE SERPL-MCNC: 272 MG/DL (ref 65–140)

## 2021-06-19 PROCEDURE — 97542 WHEELCHAIR MNGMENT TRAINING: CPT

## 2021-06-19 PROCEDURE — 97116 GAIT TRAINING THERAPY: CPT

## 2021-06-19 PROCEDURE — 97530 THERAPEUTIC ACTIVITIES: CPT

## 2021-06-19 PROCEDURE — 82948 REAGENT STRIP/BLOOD GLUCOSE: CPT

## 2021-06-19 RX ADMIN — PANTOPRAZOLE SODIUM 20 MG: 20 TABLET, DELAYED RELEASE ORAL at 09:28

## 2021-06-19 RX ADMIN — METHADONE HYDROCHLORIDE 70 MG: 10 TABLET ORAL at 09:26

## 2021-06-19 RX ADMIN — METFORMIN HYDROCHLORIDE: 500 TABLET, FILM COATED ORAL at 09:26

## 2021-06-19 RX ADMIN — MAGNESIUM OXIDE TAB 400 MG (241.3 MG ELEMENTAL MG) 400 MG: 400 (241.3 MG) TAB at 17:27

## 2021-06-19 RX ADMIN — LISINOPRIL 10 MG: 10 TABLET ORAL at 09:28

## 2021-06-19 RX ADMIN — LORATADINE 10 MG: 10 TABLET ORAL at 21:52

## 2021-06-19 RX ADMIN — APIXABAN 5 MG: 5 TABLET, FILM COATED ORAL at 17:27

## 2021-06-19 RX ADMIN — INSULIN LISPRO 1 UNITS: 100 INJECTION, SOLUTION INTRAVENOUS; SUBCUTANEOUS at 16:46

## 2021-06-19 RX ADMIN — DULOXETINE HYDROCHLORIDE 30 MG: 30 CAPSULE, DELAYED RELEASE ORAL at 21:52

## 2021-06-19 RX ADMIN — INSULIN LISPRO 1 UNITS: 100 INJECTION, SOLUTION INTRAVENOUS; SUBCUTANEOUS at 21:52

## 2021-06-19 RX ADMIN — Medication 1 TABLET: at 09:28

## 2021-06-19 RX ADMIN — PREGABALIN 75 MG: 75 CAPSULE ORAL at 15:50

## 2021-06-19 RX ADMIN — ACETAMINOPHEN 975 MG: 325 TABLET ORAL at 05:54

## 2021-06-19 RX ADMIN — PREGABALIN 75 MG: 75 CAPSULE ORAL at 21:52

## 2021-06-19 RX ADMIN — INSULIN LISPRO 3 UNITS: 100 INJECTION, SOLUTION INTRAVENOUS; SUBCUTANEOUS at 12:57

## 2021-06-19 RX ADMIN — MAGNESIUM OXIDE TAB 400 MG (241.3 MG ELEMENTAL MG) 400 MG: 400 (241.3 MG) TAB at 09:28

## 2021-06-19 RX ADMIN — METFORMIN HYDROCHLORIDE: 500 TABLET, FILM COATED ORAL at 17:26

## 2021-06-19 RX ADMIN — LIDOCAINE 4% 1 APPLICATION: 4 CREAM TOPICAL at 15:53

## 2021-06-19 RX ADMIN — PREGABALIN 75 MG: 75 CAPSULE ORAL at 09:28

## 2021-06-19 RX ADMIN — ACETAMINOPHEN 975 MG: 325 TABLET ORAL at 21:52

## 2021-06-19 RX ADMIN — DOCUSATE SODIUM 100 MG: 100 CAPSULE, LIQUID FILLED ORAL at 17:26

## 2021-06-19 RX ADMIN — FLUTICASONE PROPIONATE 1 SPRAY: 50 SPRAY, METERED NASAL at 09:34

## 2021-06-19 RX ADMIN — LIDOCAINE 4%: 4 CREAM TOPICAL at 09:34

## 2021-06-19 RX ADMIN — INSULIN GLARGINE 17 UNITS: 100 INJECTION, SOLUTION SUBCUTANEOUS at 21:52

## 2021-06-19 RX ADMIN — Medication 2000 UNITS: at 09:28

## 2021-06-19 RX ADMIN — ACETAMINOPHEN 975 MG: 325 TABLET ORAL at 14:18

## 2021-06-19 RX ADMIN — APIXABAN 5 MG: 5 TABLET, FILM COATED ORAL at 09:28

## 2021-06-19 RX ADMIN — DOCUSATE SODIUM 100 MG: 100 CAPSULE, LIQUID FILLED ORAL at 09:28

## 2021-06-19 NOTE — PROGRESS NOTES
06/19/21 1135   Pain Assessment   Pain Assessment Tool 0-10   Pain Score 6   Pain Location/Orientation Orientation: Right;Location: Leg   Patient's Stated Pain Goal No pain   Restrictions/Precautions   Precautions Fall Risk   Weight Bearing Restrictions No   RLE Weight Bearing Per Order NWB   ROM Restrictions No   Braces or Orthoses MAFO  (R AKA prosthesis; L MAFO)   Cognition   Overall Cognitive Status WFL   Arousal/Participation Alert; Responsive; Cooperative   Attention Within functional limits   Orientation Level Oriented X4   Memory Within functional limits   Following Commands Follows all commands and directions without difficulty   Subjective   Subjective i am looking forward to walking   Sit to Stand   Type of Assistance Needed Supervision   Physical Assistance Level 25% or less   Sit to Stand CARE Score 3   Bed-Chair Transfer   Type of Assistance Needed Supervision   Physical Assistance Level 25% or less   Chair/Bed-to-Chair Transfer CARE Score 3   Transfer Bed/Chair/Wheelchair   Stand Pivot Contact Guard   Sit to Stand Supervision   Stand to Sit Supervision   Car Transfer   Reason if not Attempted Safety concerns   Car Transfer CARE Score 88   Walk 10 Feet   Type of Assistance Needed Incidental touching   Physical Assistance Level 25% or less   Comment CGA   Walk 10 Feet CARE Score 3   Walk 50 Feet with Two Turns   Type of Assistance Needed Incidental touching   Physical Assistance Level 25% or less   Comment CGA   Walk 50 Feet with Two Turns CARE Score 3   Walk 150 Feet   Reason if not Attempted Safety concerns   Walk 150 Feet CARE Score 88   Walking 10 Feet on Uneven Surfaces   Reason if not Attempted Safety concerns   Walking 10 Feet on Uneven Surfaces CARE Score 88   Ambulation   Does the patient walk? 2  Yes   Primary Mode of Locomotion Prior to Admission Walk   Distance Walked (feet) 58 ft  (46; 43; 55)   Assist Device Roller Walker   Gait Pattern Antalgic; Inconsistant Cecelia; Slow Cecelia;Decreased foot clearance;Narrow DEACON;Step to   Limitations Noted In Balance; Coordination;Device Management;Strength;Swing;Safety   Provided Assistance with: Balance   Walk Assist Level Contact Guard   Findings needs cuing for wieght shifting during swing phase of gait   Wheel 50 Feet with Two Turns   Type of Assistance Needed Independent   Physical Assistance Level No physical assistance   Wheel 50 Feet with Two Turns CARE Score 6   Wheel 150 Feet   Type of Assistance Needed Supervision   Physical Assistance Level No physical assistance   Wheel 150 Feet CARE Score 4   Wheelchair mobility   Does the patient use a wheelchair? 1  Yes   Type of Wheelchair Used 1  Manual   Method Right upper extremity; Left upper extremity   Assistance Provided For Locking Brakes   Distance Level Surface (feet) 153 ft   Findings Mod I   Curb or Single Stair   Reason if not Attempted Safety concerns   1 Step (Curb) CARE Score 88   4 Steps   Reason if not Attempted Safety concerns   4 Steps CARE Score 88   12 Steps   Reason if not Attempted Activity not applicable   12 Steps CARE Score 9   Therapeutic Interventions   Strengthening seated TE;    Balance standing; gait   Other prosthetic training; MAFO donning/doffing   Assessment   Treatment Assessment pt is doing very well in PT; he focused on donning/doffing prosthesis iwth S; he needs assist iwth donning /doffing L MAFO; he performs w/c mobilitiy iwht S/I; he uses his Left leg to lift his R leg during turns iwht w/c ; he was able to walk a few times today with prosthesis intact; he still needs cuiing to lock and unlock brace phyllis prior to sitting  he needs cuing to incresase step length on R le as well as shift his wieght accordingly  he is making progress toward PT goals   Problem List Decreased strength;Decreased range of motion;Decreased endurance; Impaired balance;Decreased mobility; Decreased safety awareness   PT Barriers   Physical Impairment Decreased strength;Decreased range of motion;Decreased endurance; Impaired balance;Decreased mobility; Decreased safety awareness   Functional Limitation Walking;Transfers;Standing; Wheelchair management   Plan   Treatment/Interventions Functional transfer training;LE strengthening/ROM; Elevations; Therapeutic exercise; Endurance training;Patient/family training;Gait training   Progress Progressing toward goals   PT Therapy Minutes   PT Time In 1135   PT Time Out 1240   PT Total Time (minutes) 65   PT Mode of treatment - Individual (minutes) 65

## 2021-06-19 NOTE — PROGRESS NOTES
06/18/21 1930   Charting Type   Charting Type Shift assessment   Neurological   Neuro (WDL) X   Level of Consciousness Alert/awake   Orientation Level Oriented X4   Cognition Appropriate judgement; Appropriate safety awareness; Appropriate attention/concentration; Appropriate for developmental age; Follows commands   Muscle Function/Sensation Assessment Sensation;Muscle strength   RUE Sensation Decreased;Numbness   RUE Muscle Strength 4- Movement against gravity and limited resistance   LUE Sensation Full sensation; No pain   LUE Muscle Strength 5- Normal strength   RLE Sensation Pain   RLE Muscle Strength 4- Movement against gravity and limited resistance   LLE Sensation Numbness   LLE Muscle Strength 4- Movement against gravity and limited resistance   Neuro Symptoms Fatigue   Relieved by Rest   Neuro Additional Assessments No   HEENT   HEENT (WDL) X   Teeth Missing teeth   Respiratory   Respiratory (WDL) WDL   Cardiac   Cardiac (WDL) WDL   Pain Assessment   Pain Assessment Tool 0-10   Pain Score 4   Peripheral Vascular   Peripheral Vascular (WDL) X   Cyanosis None   Capillary Refill Sluggish   PVS Additional Assessments No   Integumentary   Skin Color Appropriate for ethnicity   Skin Condition/Temp Warm;Dry;Flaky   Skin Integrity Bruising   Skin Location scattered   Skin Turgor Tenting   Integumentary Additional Assessments No   Trey Scale   Sensory Perceptions 3   Moisture 4   Activity 3   Mobility 3   Nutrition 3   Friction and Shear 2   Trey Scale Score 18   Wound 01/12/21 Pressure Injury Buttocks Mid;Right   Date First Assessed/Time First Assessed: 01/12/21 0300   Pre-Existing Wound: Yes  Primary Wound Type: Pressure Injury  Location: (c) Buttocks  Wound Location Orientation: Mid;Right   Wound Description MARIO   Dressing Foam, Silicon (eg  Allevyn, etc)   Dressing Status Clean;Dry; Intact   Wound 01/14/21 Thigh Right   Date First Assessed/Time First Assessed: 01/14/21 1417   Location: Thigh  Wound Location Orientation: Right  Wound Description (Comments): RIGHT LEG AKA WOUND NOTED, WOUND PACKED  Incision's 1st Dressing: KERLIX (x1), DRESSING SURGIPAD 5 X 9 IN STRL (x3)   Wound Description MARIO   Wound Site Closure Open to air   Dressing   (soft cloth wrap)   Musculoskeletal   Musculoskeletal (WDL) X   RUE Limited range of motion   RLE Prior surgery   LLE Limited range of motion   Level of Assistance Moderate assist, patient does 50-74%   Assistive Device Wheelchair   Gastrointestinal   Gastrointestinal (WDL) WDL   Bowel Shift Assessment   Assistance Needed Stool softener   Bowel Incontinence No   Bowel Management Minimal assistance   Bowel Management Deficit Help pull up; Help pull down;Supervision/safety;Grab bar use   Genitourinary   Genitourinary (WDL) WDL   Bladder Shift Assessment   Bladder Device Urinal   Bladder Incontinence No   Bladder Management Minimal assistance   Bladder Management Deficit Emptied by staff

## 2021-06-19 NOTE — PROGRESS NOTES
06/19/21 0753   Charting Type   Charting Type Shift assessment   Neurological   Neuro (WDL) X   Level of Consciousness Alert/awake   Orientation Level Oriented X4   Cognition Appropriate judgement; Follows commands   RUE Muscle Strength 5- Normal strength   LUE Muscle Strength 5- Normal strength   RLE Sensation Numbness;Tingling  (phantom feeling , Rt   AKA)   RLE Muscle Strength Other (Comment)  (rt AKA)   LLE Sensation Full sensation   LLE Muscle Strength 4- Movement against gravity and limited resistance   Neuro Symptoms None   Relieved by Rest   Neuro Additional Assessments No   Delirium Assessment- CAM    Acute Onset and Fluctuating Course (1) No   HEENT   HEENT (WDL) X   R Eye Mildly impaired vision   L Eye Intact   Vision - Corrective Lenses (at bedside) Glasses   R Ear Intact   L Ear Intact   Teeth Missing teeth   Respiratory   Respiratory (WDL) X   Respiratory Pattern Normal   Chest Assessment Chest expansion symmetrical   Bilateral Breath Sounds Clear   Respiratory Additional Assessments No   Respiratory Interventions   Respiratory Interventions Cough and deep breathe   Cough and Deep Breathe   Cough and Deep Breathe Yes   Cardiac   Cardiac (WDL) WDL   Pain Assessment   Pain Assessment Tool Pain Assessment not indicated - pt denies pain   Pain Score No Pain   Peripheral Vascular   Peripheral Vascular (WDL) WDL   Cyanosis None   PVS Additional Assessments No   Integumentary   Integumentary (WDL) X   Skin Integrity Bruising   Skin Location scattered   Skin Turgor Non-tenting   Integumentary Additional Assessments No   Trey Scale   Sensory Perceptions 3   Moisture 4   Activity 3   Mobility 3   Nutrition 3   Friction and Shear 2   Trey Scale Score 18   Wound 01/12/21 Pressure Injury Buttocks Mid;Right   Date First Assessed/Time First Assessed: 01/12/21 0300   Pre-Existing Wound: Yes  Primary Wound Type: Pressure Injury  Location: (c) Buttocks  Wound Location Orientation: Mid;Right   Dressing Foam, Silicon (eg  Allevyn, etc)   Dressing Status Clean;Dry; Intact   Wound 01/14/21 Thigh Right   Date First Assessed/Time First Assessed: 01/14/21 1417   Location: Thigh  Wound Location Orientation: Right  Wound Description (Comments): RIGHT LEG AKA WOUND NOTED, WOUND PACKED  Incision's 1st Dressing: KERLIX (x1), DRESSING SURGIPAD 5 X 9 IN STRL (x3)   [place]-wound Assessment Clean;Dry; Intact   Wound Site Closure Open to air   Musculoskeletal   Musculoskeletal (WDL) X   RLE Prior surgery   LLE Limited range of motion   Musculoskeletal Additional Assessments No   Gastrointestinal   Gastrointestinal (WDL) WDL   Last BM Date 06/18/21   Gastrointestinal Additional Assessments No   Bowel Shift Assessment   Assistance Needed Stool softener   Bowel Incontinence No   Stool Assessment   Bowel Incontinence No   Genitourinary   Genitourinary (WDL) WDL   Bladder Shift Assessment   Bladder Device Urinal   Bladder Incontinence No   Bladder Management Supervision/setup   Bladder Management Deficit Emptied by staff   Urine Assessment   Urinary Incontinence No   Urine Color Yellow/straw   Urine Appearance Clear   Urine Odor No odor   Anal/Rectal   Anal/Rectal (WDL) WDL   Psychosocial   Psychosocial (WDL) X   Needs Expressed Denies   Ability to Express Feelings Able to express   Ability to Express Needs Able to express   Ability to Express Thoughts Able to express   Ability to Understand Others Understands   [facility] Suicide Severity Rating Scale   1  Wish to be Dead No   Cough   Cough None       C/o occ phantom feeling RLE  Tolerating therapy  Offers no complaints  All meds, safety precautions reinforced  Continue same plan of care

## 2021-06-20 LAB
GLUCOSE SERPL-MCNC: 137 MG/DL (ref 65–140)
GLUCOSE SERPL-MCNC: 145 MG/DL (ref 65–140)
GLUCOSE SERPL-MCNC: 190 MG/DL (ref 65–140)
GLUCOSE SERPL-MCNC: 215 MG/DL (ref 65–140)

## 2021-06-20 PROCEDURE — 82948 REAGENT STRIP/BLOOD GLUCOSE: CPT

## 2021-06-20 PROCEDURE — 97535 SELF CARE MNGMENT TRAINING: CPT

## 2021-06-20 RX ADMIN — PREGABALIN 75 MG: 75 CAPSULE ORAL at 17:59

## 2021-06-20 RX ADMIN — PREGABALIN 75 MG: 75 CAPSULE ORAL at 08:11

## 2021-06-20 RX ADMIN — ACETAMINOPHEN 975 MG: 325 TABLET ORAL at 13:21

## 2021-06-20 RX ADMIN — Medication 2000 UNITS: at 08:09

## 2021-06-20 RX ADMIN — DULOXETINE HYDROCHLORIDE 30 MG: 30 CAPSULE, DELAYED RELEASE ORAL at 21:04

## 2021-06-20 RX ADMIN — ACETAMINOPHEN 975 MG: 325 TABLET ORAL at 05:29

## 2021-06-20 RX ADMIN — PREGABALIN 75 MG: 75 CAPSULE ORAL at 21:04

## 2021-06-20 RX ADMIN — METFORMIN HYDROCHLORIDE: 500 TABLET, FILM COATED ORAL at 08:12

## 2021-06-20 RX ADMIN — MAGNESIUM OXIDE TAB 400 MG (241.3 MG ELEMENTAL MG) 400 MG: 400 (241.3 MG) TAB at 08:09

## 2021-06-20 RX ADMIN — APIXABAN 5 MG: 5 TABLET, FILM COATED ORAL at 17:52

## 2021-06-20 RX ADMIN — INSULIN LISPRO 1 UNITS: 100 INJECTION, SOLUTION INTRAVENOUS; SUBCUTANEOUS at 21:05

## 2021-06-20 RX ADMIN — INSULIN GLARGINE 17 UNITS: 100 INJECTION, SOLUTION SUBCUTANEOUS at 21:05

## 2021-06-20 RX ADMIN — METHADONE HYDROCHLORIDE 70 MG: 10 TABLET ORAL at 08:09

## 2021-06-20 RX ADMIN — DOCUSATE SODIUM 100 MG: 100 CAPSULE, LIQUID FILLED ORAL at 08:12

## 2021-06-20 RX ADMIN — LORATADINE 10 MG: 10 TABLET ORAL at 21:04

## 2021-06-20 RX ADMIN — INSULIN LISPRO 1 UNITS: 100 INJECTION, SOLUTION INTRAVENOUS; SUBCUTANEOUS at 08:13

## 2021-06-20 RX ADMIN — METFORMIN HYDROCHLORIDE: 500 TABLET, FILM COATED ORAL at 17:52

## 2021-06-20 RX ADMIN — APIXABAN 5 MG: 5 TABLET, FILM COATED ORAL at 08:09

## 2021-06-20 RX ADMIN — ACETAMINOPHEN 975 MG: 325 TABLET ORAL at 21:04

## 2021-06-20 RX ADMIN — MAGNESIUM OXIDE TAB 400 MG (241.3 MG ELEMENTAL MG) 400 MG: 400 (241.3 MG) TAB at 17:52

## 2021-06-20 RX ADMIN — DOCUSATE SODIUM 100 MG: 100 CAPSULE, LIQUID FILLED ORAL at 17:52

## 2021-06-20 RX ADMIN — LISINOPRIL 10 MG: 10 TABLET ORAL at 08:11

## 2021-06-20 RX ADMIN — FLUTICASONE PROPIONATE 1 SPRAY: 50 SPRAY, METERED NASAL at 08:08

## 2021-06-20 RX ADMIN — PANTOPRAZOLE SODIUM 20 MG: 20 TABLET, DELAYED RELEASE ORAL at 08:11

## 2021-06-20 RX ADMIN — Medication 1 TABLET: at 08:09

## 2021-06-20 NOTE — PLAN OF CARE
Problem: Potential for Falls  Goal: Patient will remain free of falls  Description: INTERVENTIONS:  - Assess patient frequently for physical needs  -  Identify cognitive and physical deficits and behaviors that affect risk of falls    -  [place] fall precautions as indicated by assessment   - Educate patient/family on patient safety including physical limitations  - Instruct patient to call for assistance with activity based on assessment  - Modify environment to reduce risk of injury  - Consider OT/PT consult to assist with strengthening/mobility  Outcome: Progressing     Problem: Prexisting or High Potential for Compromised Skin Integrity  Goal: Skin integrity is maintained or improved  Description: INTERVENTIONS:  - Identify patients at risk for skin breakdown  - Assess and monitor skin integrity  - Assess and monitor nutrition and hydration status  - Monitor labs   - Assess for incontinence   - Turn and reposition patient  - Assist with mobility/ambulation  - Relieve pressure over bony prominences  - Avoid friction and shearing  - Provide appropriate hygiene as needed including keeping skin clean and dry  - Evaluate need for skin moisturizer/barrier cream  - Collaborate with interdisciplinary team   - Patient/family teaching  - Consider wound care consult   Outcome: Progressing     Problem: PAIN - ADULT  Goal: Verbalizes/displays adequate comfort level or baseline comfort level  Description: Interventions:  - Encourage patient to monitor pain and request assistance  - Assess pain using appropriate pain scale  - Administer analgesics based on type and severity of pain and evaluate response  - Implement non-pharmacological measures as appropriate and evaluate response  - Consider cultural and social influences on pain and pain management  - Notify physician/advanced practitioner if interventions unsuccessful or patient reports new pain  Outcome: Progressing     Problem: SAFETY ADULT  Goal: Patient will remain free of falls  Description: INTERVENTIONS:  - Assess patient frequently for physical needs  -  Identify cognitive and physical deficits and behaviors that affect risk of falls    -  [place] fall precautions as indicated by assessment   - Educate patient/family on patient safety including physical limitations  - Instruct patient to call for assistance with activity based on assessment  - Modify environment to reduce risk of injury  - Consider OT/PT consult to assist with strengthening/mobility  Outcome: Progressing  Goal: Maintain or return to baseline ADL function  Description: INTERVENTIONS:  -  Assess patient's ability to carry out ADLs; assess patient's baseline for ADL function and identify physical deficits which impact ability to perform ADLs (bathing, care of mouth/teeth, toileting, grooming, dressing, etc )  - Assess/evaluate cause of self-care deficits   - Assess range of motion  - Assess patient's mobility; develop plan if impaired  - Assess patient's need for assistive devices and provide as appropriate  - Encourage maximum independence but intervene and supervise when necessary  - Involve family in performance of ADLs  - Assess for home care needs following discharge   - Consider OT consult to assist with ADL evaluation and planning for discharge  - Provide patient education as appropriate  Outcome: Progressing  Goal: Maintain or return mobility status to optimal level  Description: INTERVENTIONS:  - Assess patient's baseline mobility status (ambulation, transfers, stairs, etc )    - Identify cognitive and physical deficits and behaviors that affect mobility  - Identify mobility aids required to assist with transfers and/or ambulation (gait belt, sit-to-stand, lift, walker, cane, etc )  - [place] fall precautions as indicated by assessment  - Record patient progress and toleration of activity level on Mobility SBAR; progress patient to next Phase/Stage  - Instruct patient to call for assistance with activity based on assessment  - Consider rehabilitation consult to assist with strengthening/weightbearing, etc   Outcome: Progressing     Problem: DISCHARGE PLANNING  Goal: Discharge to home or other facility with appropriate resources  Description: INTERVENTIONS:  - Identify barriers to discharge w/patient and caregiver  - Arrange for needed discharge resources and transportation as appropriate  - Identify discharge learning needs (meds, wound care, etc )  - Arrange for interpretive services to assist at discharge as needed  - Refer to Case Management Department for coordinating discharge planning if the patient needs post-hospital services based on physician/advanced practitioner order or complex needs related to functional status, cognitive ability, or social support system  Outcome: Progressing     Problem: SAFETY ADULT  Goal: Patient will remain free of falls  Description: INTERVENTIONS:  - Assess patient frequently for physical needs  -  Identify cognitive and physical deficits and behaviors that affect risk of falls    -  [place] fall precautions as indicated by assessment   - Educate patient/family on patient safety including physical limitations  - Instruct patient to call for assistance with activity based on assessment  - Modify environment to reduce risk of injury  - Consider OT/PT consult to assist with strengthening/mobility  Outcome: Progressing  Goal: Maintain or return to baseline ADL function  Description: INTERVENTIONS:  -  Assess patient's ability to carry out ADLs; assess patient's baseline for ADL function and identify physical deficits which impact ability to perform ADLs (bathing, care of mouth/teeth, toileting, grooming, dressing, etc )  - Assess/evaluate cause of self-care deficits   - Assess range of motion  - Assess patient's mobility; develop plan if impaired  - Assess patient's need for assistive devices and provide as appropriate  - Encourage maximum independence but intervene and supervise when necessary  - Involve family in performance of ADLs  - Assess for home care needs following discharge   - Consider OT consult to assist with ADL evaluation and planning for discharge  - Provide patient education as appropriate  Outcome: Progressing  Goal: Maintain or return mobility status to optimal level  Description: INTERVENTIONS:  - Assess patient's baseline mobility status (ambulation, transfers, stairs, etc )    - Identify cognitive and physical deficits and behaviors that affect mobility  - Identify mobility aids required to assist with transfers and/or ambulation (gait belt, sit-to-stand, lift, walker, cane, etc )  - [place] fall precautions as indicated by assessment  - Record patient progress and toleration of activity level on Mobility SBAR; progress patient to next Phase/Stage  - Instruct patient to call for assistance with activity based on assessment  - Consider rehabilitation consult to assist with strengthening/weightbearing, etc   Outcome: Progressing     Problem: Knowledge Deficit  Goal: Patient/family/caregiver demonstrates understanding of disease process, treatment plan, medications, and discharge instructions  Description: Complete learning assessment and assess knowledge base    Interventions:  - Provide teaching at level of understanding  - Provide teaching via preferred learning methods  Outcome: Progressing     Problem: SKIN/TISSUE INTEGRITY - ADULT  Goal: Skin integrity remains intact  Description: INTERVENTIONS  - Identify patients at risk for skin breakdown  - Assess and monitor skin integrity  - Assess and monitor nutrition and hydration status  - Monitor labs (i e  albumin)  - Assess for incontinence   - Turn and reposition patient  - Assist with mobility/ambulation  - Relieve pressure over bony prominences  - Avoid friction and shearing  - Provide appropriate hygiene as needed including keeping skin clean and dry  - Evaluate need for skin moisturizer/barrier cream  - Collaborate with interdisciplinary team (i e  Nutrition, Rehabilitation, etc )   - Patient/family teaching  Outcome: Progressing  Goal: Incision(s), wounds(s) or drain site(s) healing without S/S of infection  Description: INTERVENTIONS  - Assess and document risk factors for skin impairment   - Assess and document dressing, incision, wound bed, drain sites and surrounding tissue  - Consider nutrition services referral as needed  - Oral mucous membranes remain intact  - Provide patient/ family education  Outcome: Progressing     Problem: MUSCULOSKELETAL - ADULT  Goal: Maintain or return mobility to safest level of function  Description: INTERVENTIONS:  - Assess patient's ability to carry out ADLs; assess patient's baseline for ADL function and identify physical deficits which impact ability to perform ADLs (bathing, care of mouth/teeth, toileting, grooming, dressing, etc )  - Assess/evaluate cause of self-care deficits   - Assess range of motion  - Assess patient's mobility  - Assess patient's need for assistive devices and provide as appropriate  - Encourage maximum independence but intervene and supervise when necessary  - Involve family in performance of ADLs  - Assess for home care needs following discharge   - Consider OT consult to assist with ADL evaluation and planning for discharge  - Provide patient education as appropriate  Outcome: Progressing  Goal: Maintain proper alignment of affected body part  Description: INTERVENTIONS:  - Support, maintain and protect limb and body alignment  - Provide patient/ family with appropriate education  Outcome: Progressing

## 2021-06-20 NOTE — PROGRESS NOTES
OT Daily Progress       06/20/21 0871   Pain Assessment   Pain Assessment Tool Pain Assessment not indicated - pt denies pain   Pain Score No Pain   Bathing   Assessed Bath Style Shower   Anticipated D/C Bath Style Shower;Sponge Bath   Able to [place] Silvio No   Able to [employer] Temperature Yes   Able to Wash/Rinse/Dry (body part) Left Arm;Right Arm;L Upper Leg;R Upper Leg;L Lower Leg/Foot;Chest;Abdomen;Perineal Area; Buttocks   Limitations Noted in ROM;Balance; Endurance;Strength   Positioning Seated;Standing   Adaptive Equipment Shower Seat;Hand Held Shower; Shower Bars   Tub/Shower Transfer   Limitations Noted In Balance;ROM;LE Strength   Adaptive Equipment Grab Bars;Seat with out Back   Assessed Shower   Findings Supervision sit pivot from wheelchair to shower chair  use grab bars for balance    Upper Body Dressing   Type of Assistance Needed Independent   Physical Assistance Level No physical assistance   Comment sitting up in wheelchair   Upper Body Dressing CARE Score 6   Lower Body Dressing   Type of Assistance Needed Physical assistance   Physical Assistance Level 25% or less   Comment min A to pull pants down/up over buttocks in standing    Lower Body Dressing CARE Score 3   Putting On/Taking Off Footwear   Type of Assistance Needed Physical assistance   Physical Assistance Level 25% or less   Comment to assist with donning MAFO on L    Putting On/Taking Off Footwear CARE Score 3   Sit to Stand   Type of Assistance Needed Supervision   Physical Assistance Level 25% or less   Comment CG A    Sit to Stand CARE Score 3   Bed-Chair Transfer   Type of Assistance Needed Supervision   Physical Assistance Level No physical assistance   Comment slide board  Chair/Bed-to-Chair Transfer CARE Score 4   Cognition   Overall Cognitive Status WFL   Arousal/Participation Alert; Responsive; Cooperative   Attention Within functional limits   Orientation Level Oriented X4   Memory Within functional limits   Following Commands Follows all commands and directions without difficulty   Activity Tolerance   Activity Tolerance Patient tolerated treatment well   OT Therapy Minutes   OT Time In 0830   OT Time Out 0930   OT Total Time (minutes) 60   OT Mode of treatment - Individual (minutes) 60       Pt seen for follow up visit this AM   Agreeable to OT/shower  Transfers, bathing, dressing tasks as noted above  Post dressing, patient requests to camille R prosthesis  Able to manage leg sleeve independently  Does successfully camille R prosthetic with increased time - difficulty lining up peg with prosthesis  Sit to stand with min A from wheelchair  Short distance funct mob in room with RW and CG A  Pt fatigues quickly  Doffs prosthesis with supervision  Left OOB in chair, needs in reach  Tolerating treatment well  Continues with deficits in balance, endurance which impacts self care tasks  Continue to progress treatment as status allows

## 2021-06-20 NOTE — NURSING NOTE
Pt slept through the night with no signs of pain or distress observed during rounding  Pt used urinal throughout the night  All needs are currently met  Call bell within reach  Will continue to monitor and follow plan of care

## 2021-06-21 LAB
GLUCOSE SERPL-MCNC: 115 MG/DL (ref 65–140)
GLUCOSE SERPL-MCNC: 125 MG/DL (ref 65–140)
GLUCOSE SERPL-MCNC: 163 MG/DL (ref 65–140)
GLUCOSE SERPL-MCNC: 185 MG/DL (ref 65–140)

## 2021-06-21 PROCEDURE — 97116 GAIT TRAINING THERAPY: CPT

## 2021-06-21 PROCEDURE — 97530 THERAPEUTIC ACTIVITIES: CPT

## 2021-06-21 PROCEDURE — 97542 WHEELCHAIR MNGMENT TRAINING: CPT

## 2021-06-21 PROCEDURE — 97110 THERAPEUTIC EXERCISES: CPT

## 2021-06-21 PROCEDURE — 97537 COMMUNITY/WORK REINTEGRATION: CPT

## 2021-06-21 PROCEDURE — 82948 REAGENT STRIP/BLOOD GLUCOSE: CPT

## 2021-06-21 PROCEDURE — 99231 SBSQ HOSP IP/OBS SF/LOW 25: CPT | Performed by: PHYSICAL MEDICINE & REHABILITATION

## 2021-06-21 RX ADMIN — LISINOPRIL 10 MG: 10 TABLET ORAL at 08:26

## 2021-06-21 RX ADMIN — INSULIN GLARGINE 17 UNITS: 100 INJECTION, SOLUTION SUBCUTANEOUS at 21:41

## 2021-06-21 RX ADMIN — PREGABALIN 75 MG: 75 CAPSULE ORAL at 17:48

## 2021-06-21 RX ADMIN — MAGNESIUM OXIDE TAB 400 MG (241.3 MG ELEMENTAL MG) 400 MG: 400 (241.3 MG) TAB at 17:48

## 2021-06-21 RX ADMIN — APIXABAN 5 MG: 5 TABLET, FILM COATED ORAL at 17:48

## 2021-06-21 RX ADMIN — LIDOCAINE 4%: 4 CREAM TOPICAL at 08:33

## 2021-06-21 RX ADMIN — PREGABALIN 75 MG: 75 CAPSULE ORAL at 08:26

## 2021-06-21 RX ADMIN — APIXABAN 5 MG: 5 TABLET, FILM COATED ORAL at 08:26

## 2021-06-21 RX ADMIN — MAGNESIUM OXIDE TAB 400 MG (241.3 MG ELEMENTAL MG) 400 MG: 400 (241.3 MG) TAB at 08:26

## 2021-06-21 RX ADMIN — DOCUSATE SODIUM 100 MG: 100 CAPSULE, LIQUID FILLED ORAL at 17:48

## 2021-06-21 RX ADMIN — ACETAMINOPHEN 975 MG: 325 TABLET ORAL at 13:17

## 2021-06-21 RX ADMIN — Medication 2000 UNITS: at 08:26

## 2021-06-21 RX ADMIN — PREGABALIN 75 MG: 75 CAPSULE ORAL at 21:39

## 2021-06-21 RX ADMIN — ACETAMINOPHEN 975 MG: 325 TABLET ORAL at 21:39

## 2021-06-21 RX ADMIN — METHADONE HYDROCHLORIDE 70 MG: 10 TABLET ORAL at 08:26

## 2021-06-21 RX ADMIN — INSULIN LISPRO 1 UNITS: 100 INJECTION, SOLUTION INTRAVENOUS; SUBCUTANEOUS at 17:50

## 2021-06-21 RX ADMIN — FLUTICASONE PROPIONATE 1 SPRAY: 50 SPRAY, METERED NASAL at 08:27

## 2021-06-21 RX ADMIN — ACETAMINOPHEN 975 MG: 325 TABLET ORAL at 05:10

## 2021-06-21 RX ADMIN — METFORMIN HYDROCHLORIDE: 500 TABLET, FILM COATED ORAL at 08:26

## 2021-06-21 RX ADMIN — DOCUSATE SODIUM 100 MG: 100 CAPSULE, LIQUID FILLED ORAL at 08:26

## 2021-06-21 RX ADMIN — Medication 1 TABLET: at 08:26

## 2021-06-21 RX ADMIN — PANTOPRAZOLE SODIUM 20 MG: 20 TABLET, DELAYED RELEASE ORAL at 08:26

## 2021-06-21 RX ADMIN — LORATADINE 10 MG: 10 TABLET ORAL at 21:39

## 2021-06-21 RX ADMIN — DULOXETINE HYDROCHLORIDE 30 MG: 30 CAPSULE, DELAYED RELEASE ORAL at 21:39

## 2021-06-21 RX ADMIN — INSULIN LISPRO 1 UNITS: 100 INJECTION, SOLUTION INTRAVENOUS; SUBCUTANEOUS at 08:35

## 2021-06-21 RX ADMIN — METFORMIN HYDROCHLORIDE: 500 TABLET, FILM COATED ORAL at 17:48

## 2021-06-21 NOTE — ASSESSMENT & PLAN NOTE
Acute chomprehensive interdisciplinary inpatient rehabilitation to include intensive skilled therapies as outlines with oversight and management by a rehabilitation Physician Assistant overseen by rehabilitation physician as well as inpatient rehabilitation nursing, case management and weekly interdisciplinary team meeting  Ecchymosis noted to stump after prosthesis usage  Patient states he is having difficulty sleeping with the pain  Will continue tylenol and methadone  Encourage ice  MAFO added to LLE, less eversion noted     Lidocaine cream helping to alleviate tenderness with prosthesis in place

## 2021-06-21 NOTE — PLAN OF CARE
Problem: Potential for Falls  Goal: Patient will remain free of falls  Description: INTERVENTIONS:  - Assess patient frequently for physical needs  -  Identify cognitive and physical deficits and behaviors that affect risk of falls    -  [place] fall precautions as indicated by assessment   - Educate patient/family on patient safety including physical limitations  - Instruct patient to call for assistance with activity based on assessment  - Modify environment to reduce risk of injury  - Consider OT/PT consult to assist with strengthening/mobility  Outcome: Progressing     Problem: Prexisting or High Potential for Compromised Skin Integrity  Goal: Skin integrity is maintained or improved  Description: INTERVENTIONS:  - Identify patients at risk for skin breakdown  - Assess and monitor skin integrity  - Assess and monitor nutrition and hydration status  - Monitor labs   - Assess for incontinence   - Turn and reposition patient  - Assist with mobility/ambulation  - Relieve pressure over bony prominences  - Avoid friction and shearing  - Provide appropriate hygiene as needed including keeping skin clean and dry  - Evaluate need for skin moisturizer/barrier cream  - Collaborate with interdisciplinary team   - Patient/family teaching  - Consider wound care consult   Outcome: Progressing     Problem: PAIN - ADULT  Goal: Verbalizes/displays adequate comfort level or baseline comfort level  Description: Interventions:  - Encourage patient to monitor pain and request assistance  - Assess pain using appropriate pain scale  - Administer analgesics based on type and severity of pain and evaluate response  - Implement non-pharmacological measures as appropriate and evaluate response  - Consider cultural and social influences on pain and pain management  - Notify physician/advanced practitioner if interventions unsuccessful or patient reports new pain  Outcome: Progressing     Problem: SAFETY ADULT  Goal: Patient will remain free of falls  Description: INTERVENTIONS:  - Assess patient frequently for physical needs  -  Identify cognitive and physical deficits and behaviors that affect risk of falls    -  [place] fall precautions as indicated by assessment   - Educate patient/family on patient safety including physical limitations  - Instruct patient to call for assistance with activity based on assessment  - Modify environment to reduce risk of injury  - Consider OT/PT consult to assist with strengthening/mobility  Outcome: Progressing  Goal: Maintain or return to baseline ADL function  Description: INTERVENTIONS:  -  Assess patient's ability to carry out ADLs; assess patient's baseline for ADL function and identify physical deficits which impact ability to perform ADLs (bathing, care of mouth/teeth, toileting, grooming, dressing, etc )  - Assess/evaluate cause of self-care deficits   - Assess range of motion  - Assess patient's mobility; develop plan if impaired  - Assess patient's need for assistive devices and provide as appropriate  - Encourage maximum independence but intervene and supervise when necessary  - Involve family in performance of ADLs  - Assess for home care needs following discharge   - Consider OT consult to assist with ADL evaluation and planning for discharge  - Provide patient education as appropriate  Outcome: Progressing  Goal: Maintain or return mobility status to optimal level  Description: INTERVENTIONS:  - Assess patient's baseline mobility status (ambulation, transfers, stairs, etc )    - Identify cognitive and physical deficits and behaviors that affect mobility  - Identify mobility aids required to assist with transfers and/or ambulation (gait belt, sit-to-stand, lift, walker, cane, etc )  - [place] fall precautions as indicated by assessment  - Record patient progress and toleration of activity level on Mobility SBAR; progress patient to next Phase/Stage  - Instruct patient to call for assistance with activity based on assessment  - Consider rehabilitation consult to assist with strengthening/weightbearing, etc   Outcome: Progressing     Problem: DISCHARGE PLANNING  Goal: Discharge to home or other facility with appropriate resources  Description: INTERVENTIONS:  - Identify barriers to discharge w/patient and caregiver  - Arrange for needed discharge resources and transportation as appropriate  - Identify discharge learning needs (meds, wound care, etc )  - Arrange for interpretive services to assist at discharge as needed  - Refer to Case Management Department for coordinating discharge planning if the patient needs post-hospital services based on physician/advanced practitioner order or complex needs related to functional status, cognitive ability, or social support system  Outcome: Progressing     Problem: SAFETY ADULT  Goal: Patient will remain free of falls  Description: INTERVENTIONS:  - Assess patient frequently for physical needs  -  Identify cognitive and physical deficits and behaviors that affect risk of falls    -  [place] fall precautions as indicated by assessment   - Educate patient/family on patient safety including physical limitations  - Instruct patient to call for assistance with activity based on assessment  - Modify environment to reduce risk of injury  - Consider OT/PT consult to assist with strengthening/mobility  Outcome: Progressing  Goal: Maintain or return to baseline ADL function  Description: INTERVENTIONS:  -  Assess patient's ability to carry out ADLs; assess patient's baseline for ADL function and identify physical deficits which impact ability to perform ADLs (bathing, care of mouth/teeth, toileting, grooming, dressing, etc )  - Assess/evaluate cause of self-care deficits   - Assess range of motion  - Assess patient's mobility; develop plan if impaired  - Assess patient's need for assistive devices and provide as appropriate  - Encourage maximum independence but intervene and supervise when necessary  - Involve family in performance of ADLs  - Assess for home care needs following discharge   - Consider OT consult to assist with ADL evaluation and planning for discharge  - Provide patient education as appropriate  Outcome: Progressing  Goal: Maintain or return mobility status to optimal level  Description: INTERVENTIONS:  - Assess patient's baseline mobility status (ambulation, transfers, stairs, etc )    - Identify cognitive and physical deficits and behaviors that affect mobility  - Identify mobility aids required to assist with transfers and/or ambulation (gait belt, sit-to-stand, lift, walker, cane, etc )  - [place] fall precautions as indicated by assessment  - Record patient progress and toleration of activity level on Mobility SBAR; progress patient to next Phase/Stage  - Instruct patient to call for assistance with activity based on assessment  - Consider rehabilitation consult to assist with strengthening/weightbearing, etc   Outcome: Progressing     Problem: Knowledge Deficit  Goal: Patient/family/caregiver demonstrates understanding of disease process, treatment plan, medications, and discharge instructions  Description: Complete learning assessment and assess knowledge base    Interventions:  - Provide teaching at level of understanding  - Provide teaching via preferred learning methods  Outcome: Progressing     Problem: SKIN/TISSUE INTEGRITY - ADULT  Goal: Skin integrity remains intact  Description: INTERVENTIONS  - Identify patients at risk for skin breakdown  - Assess and monitor skin integrity  - Assess and monitor nutrition and hydration status  - Monitor labs (i e  albumin)  - Assess for incontinence   - Turn and reposition patient  - Assist with mobility/ambulation  - Relieve pressure over bony prominences  - Avoid friction and shearing  - Provide appropriate hygiene as needed including keeping skin clean and dry  - Evaluate need for skin moisturizer/barrier cream  - Collaborate with interdisciplinary team (i e  Nutrition, Rehabilitation, etc )   - Patient/family teaching  Outcome: Progressing  Goal: Incision(s), wounds(s) or drain site(s) healing without S/S of infection  Description: INTERVENTIONS  - Assess and document risk factors for skin impairment   - Assess and document dressing, incision, wound bed, drain sites and surrounding tissue  - Consider nutrition services referral as needed  - Oral mucous membranes remain intact  - Provide patient/ family education  Outcome: Progressing     Problem: MUSCULOSKELETAL - ADULT  Goal: Maintain or return mobility to safest level of function  Description: INTERVENTIONS:  - Assess patient's ability to carry out ADLs; assess patient's baseline for ADL function and identify physical deficits which impact ability to perform ADLs (bathing, care of mouth/teeth, toileting, grooming, dressing, etc )  - Assess/evaluate cause of self-care deficits   - Assess range of motion  - Assess patient's mobility  - Assess patient's need for assistive devices and provide as appropriate  - Encourage maximum independence but intervene and supervise when necessary  - Involve family in performance of ADLs  - Assess for home care needs following discharge   - Consider OT consult to assist with ADL evaluation and planning for discharge  - Provide patient education as appropriate  Outcome: Progressing  Goal: Maintain proper alignment of affected body part  Description: INTERVENTIONS:  - Support, maintain and protect limb and body alignment  - Provide patient/ family with appropriate education  Outcome: Progressing

## 2021-06-21 NOTE — PROGRESS NOTES
[facility]  Progress Note Lillianat Lutheran 1960, 59 y o  male MRN: [id number]  Unit/Bed#: -01 Encounter: [id number]  Primary Care Provider: VERONICA Locke   Date and time admitted to hospital: 6/10/2021 11:44 AM    HPI: Isaiah haq 59 y  o  male who presented to the [address] [place] [address] for acute rehabilitation services on 6/10/21  Prior to this admission the patient required an emergent right AKA secondary to ischemia of RLE  A suspected embolism and aortic thrombus  Surgery was performed on 1/14/21 bu Dr Riana Tristan  He discharged to acute rehab on 2/25/21  From [facility] he readmitted back to [address] [place] [address] on 2/25/21 with increased SOB and hypoxia  He was discharged back to [facility] acute rehab on 3/3/21  From [facility] he discharged home  Pt received his prosthesis around 5/31 from [facility] [place]  Patient accepted at [address] [place] [address] for prosthesis training secondary to right AKA  He has a PMH significant for right femoral DVT, anemia, aortic thrombus, Type 2 diabetes, Left hydropneumax, GERD, HTN, hypercholesterolemia, long-term methadone use due to chronic low back pain and cervical radiculopathy  Subjective:  Patient has no complaints  Questioned whether he can wear the prosthesis in the shower  Also requested a commode  ROS: A 10 point ROS was performed; negative except as noted above         Assessment/Plan:    Chronic bilateral low back pain  Assessment & Plan  Continue methadone 70 mg    Cervical radiculopathy  Assessment & Plan  Continue methadone 70 mg    Type 2 diabetes mellitus, with long-term current use of insulin [facility])  Assessment & Plan  Lab Results   Component Value Date    HGBA1C 7 7 (H) 05/29/2021       Recent Labs     06/20/21  1116 06/20/21  1657 06/20/21 2031 06/21/21  0719   POCGLU 145* 137 215* 185*       Blood Sugar Average: Last 72 hrs:  (P) 174 [id number] lantus 17 units HS, Janumet 50/1000 BID  Monitor accu-cheks daily  Hypoglycemia protocol in place    Family trying to get prior auth on Janumet so he can take that instead of the hospital coverage  Vitamin D deficiency  Assessment & Plan  Continue cholecalciferol 1000 units    Methadone maintenance therapy patient [facility])  Assessment & Plan  Patient with chronic low back pain and cervical radiculopathy  Continue naloxone as needed      Essential hypertension  Assessment & Plan  Continue lisinopril 10 mg daily  Monitor vitals    * S/P AKA (above knee amputation), right [facility])  Assessment & Plan  Acute chomprehensive interdisciplinary inpatient rehabilitation to include intensive skilled therapies as outlines with oversight and management by a rehabilitation Physician Assistant overseen by rehabilitation physician as well as inpatient rehabilitation nursing, case management and weekly interdisciplinary team meeting  Ecchymosis noted to stump after prosthesis usage  Patient states he is having difficulty sleeping with the pain  Will continue tylenol and methadone  Encourage ice  MAFO added to LLE, less eversion noted     Lidocaine cream helping to alleviate tenderness with prosthesis in place        Scheduled Meds:  Current Facility-Administered Medications   Medication Dose Route Frequency Provider Last Rate    acetaminophen  975 mg Oral [facility] Normie Schlatter, MD      albuterol  2 puff Inhalation Q4H PRN Surendra Aleksandr, PA-C      apixaban  5 mg Oral BID Surendra Aleksandr, PA-C      bisacodyl  10 mg Rectal Daily PRN Hymera Aleksandr, PA-C      cholecalciferol  2,000 Units Oral Daily Hymera Aleksandr, PA-C      Diclofenac Sodium  4 g Topical 4x Daily PRN Surendra Aleksandr, PA-C      docusate sodium  100 mg Oral BID Surendra Aleksandr, PA-C      DULoxetine  30 mg Oral HS Surendra Aleksandr, PA-C      Empagliflozin  25 mg Oral QAM Surendra Aleksandr, PA-C      fluticasone  1 spray Nasal Daily Surendra Aleksandr, PA-C      insulin glargine  17 Units Subcutaneous HS Ori Morenoith, PA-C      insulin lispro  1-5 Units Subcutaneous HS Carla Mark, PA-C      insulin lispro  1-5 Units Subcutaneous TID AC Carla Mark, PA-ELSIE      lidocaine   Topical 4x Daily PRN Ori Morenoith, PA-C      lisinopril  10 mg Oral Daily Margaretetta Coppersmith, PA-C      loratadine  10 mg Oral HS Ori Velizsmith, PA-C      magnesium oxide  400 mg Oral BID Ori Coppersmith, PA-C      methadone  70 mg Oral Daily Ori Coppersmith, PA-C      multivitamin-minerals  1 tablet Oral Daily Ori Coppersmith, PA-C      pantoprazole  20 mg Oral Daily Ori Velizsmith, PA-C      pregabalin  75 mg Oral TID Ori Velizsmith, PA-C      sitaGLIPtin-metFORMIN (JANUMET 50/1000) combo dose   Oral BID Ori Velizsmith, PA-C         Objective:    Restrictions include:  right lower extremity with new prosthesis Fall precautions      Functional History - Prior to Admission:      Functional Status: Needed prior assistance with self care, indoor mobility  His cognition he is indepdent   Patient lives in a multilevel home     Functional Status      Physical Therapy:     Weight Bearing Status: Full Weight Bearing  Transfers: Supervision  Bed Mobility: Supervision  Amulation Distance (ft): 72 feet  Ambulation: Incidental Touching  Assistive Device for Ambulation: Roller Walker  Wheelchair Mobility Distance: 300 ft  Wheelchair Mobility: Supervision  Number of Stairs: 0  Discharge Recommendations: Home with:  [address] with[de-identified] Family Support, First Floor Setup, Home Physical Therapy  Occupational Therapy:  Eating: Supervision  Grooming: Supervision  Bathing: Minimal Assistance  Bathing: Minimal Assistance  Upper Body Dressing: Supervision  Lower Body Dressing: Minimal Assistance  Toileting:  (TBA)  Tub/Shower Transfer:  (TBA)  Toilet Transfer: Incidental Touching  Cognition: Within Defined Limits  Orientation: Person, Place, Time, Situation  Discharge Recommendations: Home with:  [address] with[de-identified] Family Support, Home Occupational Therapy      Physical Exam:  Temp:  [97 2 °F (36 2 °C)] 97 2 °F (36 2 °C)  HR:  [75-89] 75  Resp:  [18] 18  BP: (113-136)/(59-75) 136/75  SpO2:  [94 %-96 %] 96 %    General: alert, no apparent distress, cooperative and comfortable  HEENT:  Head: Normocephalic, no lesions, without obvious abnormality  Eye: Normal external eye, conjunctiva, lidsc cornea  Ears: Normal external ears  Nose: Normal external nose, mucus membranes  CARDIAC:  regular rate and rhythm, S1, S2 normal, no murmur, click, rub or gallop  LUNGS:  no abnormal respiratory pattern, no retractions noted, non-labored breathing   ABDOMEN:  soft, non-tender, non-distended  EXTREMITIES:  extremities normal, warm and well-perfused; no cyanosis, clubbing, or edema and right AKA, left MAFO in place  NEURO:  clear speech, following all commands, oriented x4  PSYCH:  Alert and oriented, appropriate affect  Diagnostic Studies: Labs reviewed  XR femur 2 vw right   Final Result by Comfort Pittman MD (06/16 1609)      No acute osseous abnormality  Cannot exclude early osteomyelitis  Follow as clinically indicated  The study was marked in [place] [address] for immediate notification  Workstation performed: [id number]             Laboratory: Labs reviewed  Results from last 7 days   Lab Units 06/18/21  0601 06/15/21  0547   HEMOGLOBIN g/dL 12 3* 12 4*   HEMATOCRIT % 37 4* 37 5*   WBC Thousand/uL 6 00 6 30     Results from last 7 days   Lab Units 06/15/21  0547   BUN mg/dL 21   SODIUM mmol/L 136   POTASSIUM mmol/L 4 5   CHLORIDE mmol/L 98   CREATININE mg/dL 0 80   AST U/L 26   ALT U/L 24            ** Please Note: Fluency Direct voice to text software may have been used in the creation of this document   **

## 2021-06-21 NOTE — ASSESSMENT & PLAN NOTE
Lab Results   Component Value Date    HGBA1C 7 7 (H) 05/29/2021       Recent Labs     06/20/21  1116 06/20/21  1657 06/20/21 2031 06/21/21  0719   POCGLU 145* 137 215* 185*       Blood Sugar Average: Last 72 hrs:  (P) 534 [id number] lantus 17 units HS, Janumet 50/1000 BID  Monitor accu-cheks daily  Hypoglycemia protocol in place    Family trying to get prior auth on Janumet so he can take that instead of the hospital coverage

## 2021-06-21 NOTE — PROGRESS NOTES
06/21/21 1000   Pain Assessment   Pain Assessment Tool 0-10   Pain Score 5   Pain Location/Orientation Orientation: Right  (thigh)   Restrictions/Precautions   Precautions Fall Risk   Weight Bearing Restrictions No   Braces or Orthoses MAFO  (L LE)   Cognition   Overall Cognitive Status WFL   Arousal/Participation Alert; Responsive; Cooperative   Attention Within functional limits   Orientation Level Oriented X4   Memory Within functional limits   Following Commands Follows all commands and directions without difficulty   Subjective   Subjective Pt stated he had a good weekend, stump  has been applied properly all weekend  Pt states the prosthetic is till fitting well with no pain during wearing  Sit to Stand   Type of Assistance Needed Supervision   Physical Assistance Level 25% or less   Comment    Sit to Stand CARE Score 3   Bed-Chair Transfer   Type of Assistance Needed Supervision   Physical Assistance Level No physical assistance   Chair/Bed-to-Chair Transfer CARE Score 4   Transfer Bed/Chair/Wheelchair   Limitations Noted In Balance;Confidence; Endurance;UE Strength;LE Strength   Adaptive Equipment Roller Walker;Transfer Board   Sit Pivot Supervision   Stand Pivot Contact Guard   Sit to [employer]   Stand to Sit Supervision   Supine to Sit Modified Independent   Sit to Supine Modified Independent   Walk 10 Feet   Type of Assistance Needed Incidental touching   Physical Assistance Level 25% or less   Comment CGA   Walk 10 Feet CARE Score 3   Walk 50 Feet with Two Turns   Type of Assistance Needed Incidental touching   Physical Assistance Level 25% or less   Comment CGA   Walk 50 Feet with Two Turns CARE Score 3   Ambulation   Does the patient walk? 2  Yes   Primary Mode of Locomotion Prior to Admission Walk   Distance Walked (feet) 75 ft  (75,67)   Assist Device Roller Walker   Gait Pattern Decreased foot clearance;R foot drag; Forward Flexion;Narrow DEACON;Step to;Decreased R stance Limitations Noted In Balance; Coordination;Device Management; Endurance; Heel Strike;Sensation; Sequencing;Speed;Strength   Provided Assistance with: Direction   Walk Assist Level Contact Guard   Findings Pt needing VC for proper sequencing, heel strike, and walker placement  Wheel 50 Feet with Two Turns   Type of Assistance Needed Independent   Physical Assistance Level No physical assistance   Wheel 50 Feet with Two Turns CARE Score 6   Wheel 150 Feet   Type of Assistance Needed Independent   Physical Assistance Level No physical assistance   Wheel 150 Feet CARE Score 6   Wheelchair mobility   Does the patient use a wheelchair? 1  Yes   Type of Wheelchair Used 1  Manual   Method Right upper extremity; Left upper extremity   Assistance Provided For Locking Brakes   Distance Level Surface (feet) 183 ft   Findings Mod I   Curb or Single Stair   Style negotiated Single stair   Type of Assistance Needed Physical assistance   Physical Assistance Level 26%-50%   Comment min A   1 Step (Curb) CARE Score 3   4 Steps   Type of Assistance Needed Physical assistance   Physical Assistance Level 26%-50%   Comment min a   4 Steps CARE Score 3   Stairs   Type Stairs   # of Steps 4   Weight Bearing Precautions WBAT   Assist Devices Bilateral Rail   Findings Pt needing cuing for sequence with Min A   Therapeutic Interventions   Strengthening seated ther ex   Other gait, transfers, stairs   Assessment   Treatment Assessment Pt continues to show progression with prosthetic traning  Pt abl to amb 60' and 76' with VC for sequencing and walker management, less then cuing needed then previous session however  Pt able to propell self 142' Mod I  Pt performed all seated, supine and standing ther ex with no complication noted  Pt able to perform 4 stair with Min A  and VC for sequencing  Pt able to propell ' with Mod I  Seated in WC to end session with call bell in reach and all needs met   Pt will benefit from continued skilled PT to improve overall strength and amb with prothestic  PT Barriers   Physical Impairment Decreased strength;Decreased range of motion;Decreased endurance; Impaired balance;Decreased mobility; Decreased safety awareness   Functional Limitation Walking;Transfers;Standing;Stair negotiation;Ramp negotiation   Plan   Treatment/Interventions Functional transfer training;LE strengthening/ROM; Elevations; Therapeutic exercise; Endurance training;Bed mobility;Gait training   Progress Progressing toward goals   PT Therapy Minutes   PT Time In 1000   PT Time Out 1130   PT Total Time (minutes) 90   PT Mode of treatment - Individual (minutes) 90   PT Mode of treatment - Concurrent (minutes) 0   PT Mode of treatment - Group (minutes) 0   PT Mode of treatment - Co-treat (minutes) 0   PT Mode of Treatment - Total time(minutes) 90 minutes   PT Cumulative Minutes 635   Therapy Time missed   Time missed?  No

## 2021-06-21 NOTE — PROGRESS NOTES
OT Daily Progress       06/21/21 0800   Pain Assessment   Pain Assessment Tool 0-10   Pain Score 6   Pain Location/Orientation   (R residual limb   )   Eating   Type of Assistance Needed Independent   Eating CARE Score 6   Oral Hygiene   Type of Assistance Needed Set-up / Keturah Camila; Independent   Physical Assistance Level No physical assistance   Comment completes wheelchair level at sink   Oral Hygiene CARE Score -   Grooming   Able To Comb/Brush Hair;Wash/Dry Face;Brush/Clean Teeth;Wash/Dry Hands   Limitation Noted In Coordination;Strength   Shower/Bathe Self   Type of Assistance Needed Set-up / clean-up   Physical Assistance Level 25% or less   Comment sponge bathe at wheelchair level   Shower/Bathe Self CARE Score 3   Bathing   Assessed Bath Style Sponge Bath   Anticipated D/C Bath Style Shower;Sponge Bath   Able to Bishop Silvio No   Able to [employer] Temperature Yes   Able to Wash/Rinse/Dry (body part) Left Arm;Right Arm;L Upper Leg;R Upper Leg;L Lower Leg/Foot;R Lower Leg/Foot;Chest;Abdomen;Perineal Area; Buttocks   Limitations Noted in Balance; Endurance;ROM;Strength   Positioning Seated   Findings  completed sponge bathing at wheelchair level at [facility]    Upper Body Dressing   Type of Assistance Needed Independent   Physical Assistance Level No physical assistance   Upper Body Dressing CARE Score 6   Lower Body Dressing   Type of Assistance Needed Physical assistance   Physical Assistance Level 25% or less   Comment min A to pull pants up/down over buttocks/hips in stance  Mod A to manage shorts over R prosthesis when donning at EOB       Lower Body Dressing CARE Score 3   Putting On/Taking Off Footwear   Type of Assistance Needed Physical assistance   Physical Assistance Level 26%-50%   Comment mod A to camille L MAFO, slide into shoe with shoe horn     Putting On/Taking Off Footwear CARE Score 3   Sit to Lying   Type of Assistance Needed Independent   Sit to Lying CARE Score 6   Lying to Sitting on Side of Bed   Type of Assistance Needed Independent   Lying to Sitting on Side of Bed CARE Score 6   Sit to Stand   Type of Assistance Needed Supervision   Physical Assistance Level No physical assistance   Comment CG A   Sit to Stand CARE Score 4   Therapeutic Exercise - ROM   UE-ROM Yes   Therapeutic Excerise-Strength   UE Strength Yes   Cognition   Overall Cognitive Status WFL   Arousal/Participation Alert; Responsive; Cooperative   Attention Within functional limits   Orientation Level Oriented X4   Memory Within functional limits   Activity Tolerance   Activity Tolerance Patient tolerated treatment well;Patient limited by fatigue   OT Therapy Minutes   OT Time In 0800   OT Time Out 0930   OT Total Time (minutes) 90   OT Mode of treatment - Individual (minutes) 90     Pt seen for follow up visit  Pt awake, alert  Agreeable to OT  Pt had showered with OT yesterday, requests to sponge bathe at sink today  Indep bed mob, slide board to wheelchair mod I  Pt washes at sink - levels as noted above  Dressing tasks as noted above  Transfers back to bed to camille prosthesis prior to donning shorts  Pt with increased effort to camille prosthesis yesterday after shorts have been donned  Sits EOB to camille leg sleeve and prosthetic  Decreased effort and increased timeliness as compared to yesterday  Donns with supervision overall  Min/mod A to thread shorts over prosthetic  Continues to require assist in stance to pull pants over hips/buttocks  W/C mob to OT gym mod I  Arm bike exercises 5 mins forward and back with rest break between sets  Mod I w/c mob back to room  Left upright in wheelchair, prosthetic donned, needs in reach  Tolerating well  Decreased effort with donning prosthetic today  Continues to require assist with self care tasks due to decreased balance and act nataliya  Continue to progress treatment as status allows

## 2021-06-22 LAB
GLUCOSE SERPL-MCNC: 141 MG/DL (ref 65–140)
GLUCOSE SERPL-MCNC: 174 MG/DL (ref 65–140)
GLUCOSE SERPL-MCNC: 195 MG/DL (ref 65–140)
GLUCOSE SERPL-MCNC: 274 MG/DL (ref 65–140)

## 2021-06-22 PROCEDURE — 82948 REAGENT STRIP/BLOOD GLUCOSE: CPT

## 2021-06-22 PROCEDURE — 97537 COMMUNITY/WORK REINTEGRATION: CPT

## 2021-06-22 PROCEDURE — 97116 GAIT TRAINING THERAPY: CPT

## 2021-06-22 PROCEDURE — 97110 THERAPEUTIC EXERCISES: CPT

## 2021-06-22 PROCEDURE — 97530 THERAPEUTIC ACTIVITIES: CPT

## 2021-06-22 PROCEDURE — 99231 SBSQ HOSP IP/OBS SF/LOW 25: CPT | Performed by: PHYSICAL MEDICINE & REHABILITATION

## 2021-06-22 PROCEDURE — 97535 SELF CARE MNGMENT TRAINING: CPT

## 2021-06-22 RX ADMIN — DOCUSATE SODIUM 100 MG: 100 CAPSULE, LIQUID FILLED ORAL at 08:54

## 2021-06-22 RX ADMIN — DOCUSATE SODIUM 100 MG: 100 CAPSULE, LIQUID FILLED ORAL at 17:59

## 2021-06-22 RX ADMIN — LORATADINE 10 MG: 10 TABLET ORAL at 21:12

## 2021-06-22 RX ADMIN — METFORMIN HYDROCHLORIDE: 500 TABLET, FILM COATED ORAL at 08:52

## 2021-06-22 RX ADMIN — MAGNESIUM OXIDE TAB 400 MG (241.3 MG ELEMENTAL MG) 400 MG: 400 (241.3 MG) TAB at 08:54

## 2021-06-22 RX ADMIN — LIDOCAINE 4% 1 APPLICATION: 4 CREAM TOPICAL at 10:58

## 2021-06-22 RX ADMIN — PREGABALIN 75 MG: 75 CAPSULE ORAL at 16:48

## 2021-06-22 RX ADMIN — PREGABALIN 75 MG: 75 CAPSULE ORAL at 21:12

## 2021-06-22 RX ADMIN — ACETAMINOPHEN 975 MG: 325 TABLET ORAL at 21:12

## 2021-06-22 RX ADMIN — INSULIN LISPRO 3 UNITS: 100 INJECTION, SOLUTION INTRAVENOUS; SUBCUTANEOUS at 17:20

## 2021-06-22 RX ADMIN — Medication 1 TABLET: at 08:54

## 2021-06-22 RX ADMIN — INSULIN LISPRO 1 UNITS: 100 INJECTION, SOLUTION INTRAVENOUS; SUBCUTANEOUS at 21:14

## 2021-06-22 RX ADMIN — LISINOPRIL 10 MG: 10 TABLET ORAL at 08:54

## 2021-06-22 RX ADMIN — APIXABAN 5 MG: 5 TABLET, FILM COATED ORAL at 08:55

## 2021-06-22 RX ADMIN — APIXABAN 5 MG: 5 TABLET, FILM COATED ORAL at 17:59

## 2021-06-22 RX ADMIN — Medication 2000 UNITS: at 08:54

## 2021-06-22 RX ADMIN — METHADONE HYDROCHLORIDE 70 MG: 10 TABLET ORAL at 08:51

## 2021-06-22 RX ADMIN — DULOXETINE HYDROCHLORIDE 30 MG: 30 CAPSULE, DELAYED RELEASE ORAL at 21:12

## 2021-06-22 RX ADMIN — FLUTICASONE PROPIONATE 1 SPRAY: 50 SPRAY, METERED NASAL at 08:59

## 2021-06-22 RX ADMIN — INSULIN LISPRO 1 UNITS: 100 INJECTION, SOLUTION INTRAVENOUS; SUBCUTANEOUS at 13:20

## 2021-06-22 RX ADMIN — ACETAMINOPHEN 975 MG: 325 TABLET ORAL at 05:04

## 2021-06-22 RX ADMIN — PANTOPRAZOLE SODIUM 20 MG: 20 TABLET, DELAYED RELEASE ORAL at 08:55

## 2021-06-22 RX ADMIN — METFORMIN HYDROCHLORIDE: 500 TABLET, FILM COATED ORAL at 17:59

## 2021-06-22 RX ADMIN — ACETAMINOPHEN 975 MG: 325 TABLET ORAL at 14:02

## 2021-06-22 RX ADMIN — INSULIN GLARGINE 17 UNITS: 100 INJECTION, SOLUTION SUBCUTANEOUS at 21:13

## 2021-06-22 RX ADMIN — MAGNESIUM OXIDE TAB 400 MG (241.3 MG ELEMENTAL MG) 400 MG: 400 (241.3 MG) TAB at 17:59

## 2021-06-22 RX ADMIN — PREGABALIN 75 MG: 75 CAPSULE ORAL at 08:55

## 2021-06-22 NOTE — PROGRESS NOTES
06/22/21 0858   Pain Assessment   Pain Assessment Tool 0-10   Pain Score 8   Pain Location/Orientation Orientation: Right;Location: Leg   Restrictions/Precautions   Precautions Fall Risk   Weight Bearing Restrictions Yes   RLE Weight Bearing Per Order NWB   Oral Hygiene   Type of Assistance Needed Independent   Physical Assistance Level No physical assistance   Comment WC level, gathered materials himself sink side   Oral Hygiene CARE Score 6   Grooming   Able To Wash/Dry Face;Brush/Clean Teeth;Wash/Dry Hands;Comb/Brush Hair   Findings WC level   Shower/Bathe Self   Type of Assistance Needed Set-up / Vernelle Angeline; Adaptive equipment   Physical Assistance Level No physical assistance   Comment With walker backed in with prosthetic donned, sat on shower seat to wash    Shower/Bathe Self CARE Score 5   Bathing   Assessed Bath Style Shower   Anticipated D/C Bath Style Shower   Able to [employer] Temperature Yes   Able to Wash/Rinse/Dry (body part) Left Arm;Right Arm;L Upper Leg;R Upper Leg;R Lower Leg/Foot;L Lower Leg/Foot;Chest;Abdomen;Perineal Area; Buttocks   Limitations Noted in Balance;Problem Solving   Positioning Seated   Adaptive Equipment Longhand Sponge; Shower Prestodiag; Shower Seat;[employer]  S/U using adaptive equip to wash LE for safety   Tub/Shower Transfer   Limitations Noted In Balance; Endurance   Adaptive Equipment Grab Bars;Seat with Back   Assessed Shower   Findings CG, Pt transfered into the shower wearing prosthetic and using walker, transfered out via  due to endurance, skin integrity and safety concerns   Upper Body Dressing   Type of Assistance Needed Set-up / clean-up   Physical Assistance Level No physical assistance   Upper Body Dressing CARE Score 5   Lower Body Dressing   Type of Assistance Needed Set-up / Vernelle Angeline; Physical assistance   Physical Assistance Level 26%-50%   Comment Min A, Pt donned shorts and attempted weight shifting to don shorts but required assisstance, cont  ed  and practice for dressing  to increase functional independence, Prosthetic ws not worn due to fatigue Supervision for RLE sleeve, donned with no physical assistance, VC for donning smoothly, Min A for donning prosthetic requiring help for twisting bolt   Lower Body Dressing CARE Score 3   Putting On/Taking Off Footwear   Type of Assistance Needed Adaptive equipment;Set-up / clean-up   Physical Assistance Level No physical assistance   Comment donned shoes seated in WC using sock aide   Putting On/Taking Off Footwear CARE Score 5   Picking Up Object   Reason if not Attempted Safety concerns   Picking Up Object CARE Score 88   Dressing/Undressing Clothing   Able to  Apply LE Prosthesis; Remove LE Prosthesis   Remove UB Clothes Pullover Shirt   Don UB Clothes Pullover Shirt   Remove LB Clothes Shorts;Socks;TEDs; Shoes   Don LB Clothes Shorts;Socks;TEDs; Shoes   Amount of Physical Assistance Provided 25%-49%   Limitations Noted In Balance; Endurance   Adaptive Equipment Sock Aide   Positioning Supported Sit   Findings Supervision for RLE sleeve, donned with no physical assistance, VC for donning smoothly, Min A for donning prosthetic requiring help for twisting bolt   Sit to Stand   Type of Assistance Needed Incidental touching   Physical Assistance Level 25% or less   Comment CG, Prosthetic donned Pt demonstrating slight fatigue, CG for safety   Sit to Stand CARE Score 3   Bed-Chair Transfer   Type of Assistance Needed Supervision   Physical Assistance Level No physical assistance   Comment Safety with transfer board   Chair/Bed-to-Chair Transfer CARE Score 4   Transfer Bed/Chair/Wheelchair   Limitations Noted In Confidence; Coordination; Sequencing   Adaptive Equipment Transfer Board   Toileting   Able to [address] down yes, up yes     Findings simulated:Pt educated to weight shifting for clothing management, on drop arm commode   Toilet Transfer   Type of Assistance Needed Incidental touching   Physical Assistance Level 25% or less   Comment CG, with prosthetic donned; S bedside to commode slide board; S WC to commode   Toilet Transfer CARE Score 3   Toilet Transfer   Surface Assessed Drop Arm Commode   Transfer Technique Sit Pivot;Stand Pivot   Limitations Noted In Balance;Confidence; Endurance; Sequencing   Adaptive Equipment Walker   Findings CG due to Pt confidence in ability to transfer with prosthetic on, transferring with slideboard is based on confidence with transfers   Coordination   Fine Motor finding items in pink theraputty using bilateral hand coordination   Cognition   Overall Cognitive Status WFL   Orientation Level Oriented X4   Additional Activities   Additional Activities Comments functional mobility with walker Sup  in room to bathroom   Activity Tolerance   Activity Tolerance Patient limited by fatigue   Assessment   Treatment Assessment Pt tx today focused on functional transfers for ADLs while wearing prosthetic  Pt discussed current limits to functional independence at home for toileting and was educated to strategies in transfers to help increase independence  Pt transfers well without prosthetic but requires CG with prosthetic due to lack of confidence in ability  Pt's ability to problem solve transfers with prosethic donned increased during session requiring fewer VCs and showing carry over from one situation to another  Pt was limited by decreased endurance and dressing tasks were performed at [facility] 23 level  Pt continues to make fair progress  It is recommended that he continue skilled OT intervention to continue progress toward LTG and functional independence  Prognosis Fair   Problem List Decreased strength;Decreased range of motion;Decreased endurance;Decreased safety awareness   Plan   Treatment/Interventions ADL retraining;Functional transfer training; Therapeutic exercise; Endurance training;Equipment eval/education; Compensatory technique education;OT   Progress Progressing toward goals   OT Therapy Minutes   OT Time In 0858   OT Time Out 1031   OT Total Time (minutes) 93   OT Mode of treatment - Individual (minutes) 0   OT Mode of treatment - Concurrent (minutes) 0   OT Mode of treatment - Group (minutes) 0   OT Mode of treatment - Co-treat (minutes) 0   OT Mode of Treatment - Total time(minutes) 0 minutes   OT Cumulative Minutes 418   Therapy Time missed   Time missed?  No

## 2021-06-22 NOTE — PROGRESS NOTES
06/22/21 0739   Charting Type   Charting Type Shift assessment   Neurological   Neuro (WDL) X   Level of Consciousness Alert/awake   Orientation Level Oriented X4   Cognition Appropriate judgement; Follows commands   Facial Symmetry Symmetrical   RUE Muscle Strength 5- Normal strength   LUE Muscle Strength 5- Normal strength   RLE Sensation Numbness;Pain;Tingling   RLE Muscle Strength Other (Comment)  (rt  AKA)   LLE Muscle Strength 4- Movement against gravity and limited resistance   Neuro Symptoms None   Relieved by Rest   Neuro Additional Assessments No   Delirium Assessment- CAM    Acute Onset and Fluctuating Course (1) No   HEENT   HEENT (WDL) X   R Eye Mildly impaired vision   L Eye Mildly impaired vision   Vision - Corrective Lenses (at bedside) Glasses   R Ear Intact   L Ear Intact   Teeth Missing teeth   Respiratory   Respiratory (WDL) WDL   Respiratory Pattern Normal   Chest Assessment Chest expansion symmetrical   Bilateral Breath Sounds Clear   Respiratory Additional Assessments No   Respiratory Interventions   Respiratory Interventions Cough and deep breathe   Cough and Deep Breathe   Cough and Deep Breathe Yes   Cardiac   Cardiac (WDL) WDL   Pain Assessment   Pain Assessment Tool 0-10   Pain Score 5   Pain Location/Orientation Orientation: Right;Location: Leg  (stump , refused need for pain med)   Patient's Stated Pain Goal No pain   Peripheral Vascular   Peripheral Vascular (WDL) X   Cyanosis None   PVS Additional Assessments No   RUE Neurovascular Assessment   R Radial Pulse +2   LUE Neurovascular Assessment   L Radial Pulse +2   RLE Neurovascular Assessment   R Femoral Pulse Other (Comment)  (rt AKA)   LLE Neurovascular Assessment   L Pedal Pulse +1   Integumentary   Integumentary (WDL) X   Skin Condition/Temp Warm;Dry   Skin Integrity Bruising   Skin Location scattered   Skin Turgor Non-tenting   Integumentary Additional Assessments No   Trey Scale   Sensory Perceptions 3   Moisture 4 Activity 2   Mobility 3   Nutrition 3   Friction and Shear 2   Trey Scale Score 17   Wound (LDAs)   Type of Wound (LDA) Wound   Wound 01/14/21 Thigh Right   Date First Assessed/Time First Assessed: 01/14/21 1417   Location: Thigh  Wound Location Orientation: Right  Wound Description (Comments): RIGHT LEG AKA WOUND NOTED, WOUND PACKED  Incision's 1st Dressing: KERLIX (x1), DRESSING SURGIPAD 5 X 9 IN STRL (x3)   Wound Site Closure Open to air   Treatments Site care   Musculoskeletal   Musculoskeletal (WDL) X   RLE Other (Comment)  (rt AKA prosthetic)   Musculoskeletal Additional Assessments No   Gastrointestinal   Gastrointestinal (WDL) WDL   Last BM Date 06/22/21   Gastrointestinal Additional Assessments No   Bowel Shift Assessment   Assistance Needed Stool softener   Bowel Incontinence No   Stool Assessment   Bowel Incontinence No   Genitourinary   Genitourinary (WDL) WDL   Bladder Shift Assessment   Bladder Device Urinal   Bladder Incontinence No   Urine Assessment   Urinary Incontinence No   Genitalia   Male Genitalia Intact   Anal/Rectal   Anal/Rectal (WDL) WDL   Psychosocial   Psychosocial (WDL) WDL   Needs Expressed Denies   Ability to Express Feelings Able to express   Ability to Express Needs Able to express   Ability to Express Thoughts Able to express   Ability to Understand Others Understands   [place]tha Suicide Severity Rating Scale   1  Wish to be Dead No   Cough   Cough None       See pain med reassessments  Lidocaine applied to right stump this a m  after shower with relief obtained  Tolerating therapy  Reviewed all meds with pt and understood along with safety  Transfers SBT/supervision without difficulty  Continue same plan of care

## 2021-06-22 NOTE — PLAN OF CARE
Problem: Potential for Falls  Goal: Patient will remain free of falls  Description: INTERVENTIONS:  - Assess patient frequently for physical needs  -  Identify cognitive and physical deficits and behaviors that affect risk of falls    -  [place] fall precautions as indicated by assessment   - Educate patient/family on patient safety including physical limitations  - Instruct patient to call for assistance with activity based on assessment  - Modify environment to reduce risk of injury  - Consider OT/PT consult to assist with strengthening/mobility  Outcome: Progressing     Problem: Prexisting or High Potential for Compromised Skin Integrity  Goal: Skin integrity is maintained or improved  Description: INTERVENTIONS:  - Identify patients at risk for skin breakdown  - Assess and monitor skin integrity  - Assess and monitor nutrition and hydration status  - Monitor labs   - Assess for incontinence   - Turn and reposition patient  - Assist with mobility/ambulation  - Relieve pressure over bony prominences  - Avoid friction and shearing  - Provide appropriate hygiene as needed including keeping skin clean and dry  - Evaluate need for skin moisturizer/barrier cream  - Collaborate with interdisciplinary team   - Patient/family teaching  - Consider wound care consult   Outcome: Progressing     Problem: PAIN - ADULT  Goal: Verbalizes/displays adequate comfort level or baseline comfort level  Description: Interventions:  - Encourage patient to monitor pain and request assistance  - Assess pain using appropriate pain scale  - Administer analgesics based on type and severity of pain and evaluate response  - Implement non-pharmacological measures as appropriate and evaluate response  - Consider cultural and social influences on pain and pain management  - Notify physician/advanced practitioner if interventions unsuccessful or patient reports new pain  Outcome: Progressing     Problem: SAFETY ADULT  Goal: Patient will remain free of falls  Description: INTERVENTIONS:  - Assess patient frequently for physical needs  -  Identify cognitive and physical deficits and behaviors that affect risk of falls    -  [place] fall precautions as indicated by assessment   - Educate patient/family on patient safety including physical limitations  - Instruct patient to call for assistance with activity based on assessment  - Modify environment to reduce risk of injury  - Consider OT/PT consult to assist with strengthening/mobility  Outcome: Progressing  Goal: Maintain or return to baseline ADL function  Description: INTERVENTIONS:  -  Assess patient's ability to carry out ADLs; assess patient's baseline for ADL function and identify physical deficits which impact ability to perform ADLs (bathing, care of mouth/teeth, toileting, grooming, dressing, etc )  - Assess/evaluate cause of self-care deficits   - Assess range of motion  - Assess patient's mobility; develop plan if impaired  - Assess patient's need for assistive devices and provide as appropriate  - Encourage maximum independence but intervene and supervise when necessary  - Involve family in performance of ADLs  - Assess for home care needs following discharge   - Consider OT consult to assist with ADL evaluation and planning for discharge  - Provide patient education as appropriate  Outcome: Progressing  Goal: Maintain or return mobility status to optimal level  Description: INTERVENTIONS:  - Assess patient's baseline mobility status (ambulation, transfers, stairs, etc )    - Identify cognitive and physical deficits and behaviors that affect mobility  - Identify mobility aids required to assist with transfers and/or ambulation (gait belt, sit-to-stand, lift, walker, cane, etc )  - [place] fall precautions as indicated by assessment  - Record patient progress and toleration of activity level on Mobility SBAR; progress patient to next Phase/Stage  - Instruct patient to call for assistance with activity based on assessment  - Consider rehabilitation consult to assist with strengthening/weightbearing, etc   Outcome: Progressing     Problem: DISCHARGE PLANNING  Goal: Discharge to home or other facility with appropriate resources  Description: INTERVENTIONS:  - Identify barriers to discharge w/patient and caregiver  - Arrange for needed discharge resources and transportation as appropriate  - Identify discharge learning needs (meds, wound care, etc )  - Arrange for interpretive services to assist at discharge as needed  - Refer to Case Management Department for coordinating discharge planning if the patient needs post-hospital services based on physician/advanced practitioner order or complex needs related to functional status, cognitive ability, or social support system  Outcome: Progressing     Problem: SAFETY ADULT  Goal: Patient will remain free of falls  Description: INTERVENTIONS:  - Assess patient frequently for physical needs  -  Identify cognitive and physical deficits and behaviors that affect risk of falls    -  [place] fall precautions as indicated by assessment   - Educate patient/family on patient safety including physical limitations  - Instruct patient to call for assistance with activity based on assessment  - Modify environment to reduce risk of injury  - Consider OT/PT consult to assist with strengthening/mobility  Outcome: Progressing  Goal: Maintain or return to baseline ADL function  Description: INTERVENTIONS:  -  Assess patient's ability to carry out ADLs; assess patient's baseline for ADL function and identify physical deficits which impact ability to perform ADLs (bathing, care of mouth/teeth, toileting, grooming, dressing, etc )  - Assess/evaluate cause of self-care deficits   - Assess range of motion  - Assess patient's mobility; develop plan if impaired  - Assess patient's need for assistive devices and provide as appropriate  - Encourage maximum independence but intervene and supervise when necessary  - Involve family in performance of ADLs  - Assess for home care needs following discharge   - Consider OT consult to assist with ADL evaluation and planning for discharge  - Provide patient education as appropriate  Outcome: Progressing  Goal: Maintain or return mobility status to optimal level  Description: INTERVENTIONS:  - Assess patient's baseline mobility status (ambulation, transfers, stairs, etc )    - Identify cognitive and physical deficits and behaviors that affect mobility  - Identify mobility aids required to assist with transfers and/or ambulation (gait belt, sit-to-stand, lift, walker, cane, etc )  - [place] fall precautions as indicated by assessment  - Record patient progress and toleration of activity level on Mobility SBAR; progress patient to next Phase/Stage  - Instruct patient to call for assistance with activity based on assessment  - Consider rehabilitation consult to assist with strengthening/weightbearing, etc   Outcome: Progressing     Problem: Knowledge Deficit  Goal: Patient/family/caregiver demonstrates understanding of disease process, treatment plan, medications, and discharge instructions  Description: Complete learning assessment and assess knowledge base    Interventions:  - Provide teaching at level of understanding  - Provide teaching via preferred learning methods  Outcome: Progressing     Problem: SKIN/TISSUE INTEGRITY - ADULT  Goal: Skin integrity remains intact  Description: INTERVENTIONS  - Identify patients at risk for skin breakdown  - Assess and monitor skin integrity  - Assess and monitor nutrition and hydration status  - Monitor labs (i e  albumin)  - Assess for incontinence   - Turn and reposition patient  - Assist with mobility/ambulation  - Relieve pressure over bony prominences  - Avoid friction and shearing  - Provide appropriate hygiene as needed including keeping skin clean and dry  - Evaluate need for skin moisturizer/barrier cream  - Collaborate with interdisciplinary team (i e  Nutrition, Rehabilitation, etc )   - Patient/family teaching  Outcome: Progressing  Goal: Incision(s), wounds(s) or drain site(s) healing without S/S of infection  Description: INTERVENTIONS  - Assess and document risk factors for skin impairment   - Assess and document dressing, incision, wound bed, drain sites and surrounding tissue  - Consider nutrition services referral as needed  - Oral mucous membranes remain intact  - Provide patient/ family education  Outcome: Progressing     Problem: MUSCULOSKELETAL - ADULT  Goal: Maintain or return mobility to safest level of function  Description: INTERVENTIONS:  - Assess patient's ability to carry out ADLs; assess patient's baseline for ADL function and identify physical deficits which impact ability to perform ADLs (bathing, care of mouth/teeth, toileting, grooming, dressing, etc )  - Assess/evaluate cause of self-care deficits   - Assess range of motion  - Assess patient's mobility  - Assess patient's need for assistive devices and provide as appropriate  - Encourage maximum independence but intervene and supervise when necessary  - Involve family in performance of ADLs  - Assess for home care needs following discharge   - Consider OT consult to assist with ADL evaluation and planning for discharge  - Provide patient education as appropriate  Outcome: Progressing  Goal: Maintain proper alignment of affected body part  Description: INTERVENTIONS:  - Support, maintain and protect limb and body alignment  - Provide patient/ family with appropriate education  Outcome: Progressing

## 2021-06-22 NOTE — PROGRESS NOTES
[address] [facility]  Progress Note Maria Isabel Min 1960, 59 y o  male MRN: [id number]  Unit/Bed#: -01 Encounter: [id number]  Primary Care Provider: VERONICA Nunez   Date and time admitted to hospital: 6/10/2021 11:44 AM     HPI: Jeff Flores a 59 y  o  male who presented to the [address] [place] [address] for acute rehabilitation services on 6/10/21  Prior to this admission the patient required an emergent right AKA secondary to ischemia of RLE  A suspected embolism and aortic thrombus  Surgery was performed on 1/14/21 bu Dr Ni Moreno  He discharged to acute rehab on 2/25/21  From [facility] he readmitted back to [address] [place] [address] on 2/25/21 with increased SOB and hypoxia  He was discharged back to [facility] on 3/3/21  From [facility] he discharged home  [facility]  Patient accepted at [address] [place] [address] for prosthesis training secondary to right AKA  He has a PMH significant for right femoral DVT, anemia, aortic thrombus, Type 2 diabetes, Left hydropneumax, GERD, HTN, hypercholesterolemia, long-term methadone use due to chronic low back pain and cervical radiculopathy  Subjective:  Patient has no complaints today  Discussed with patient about if no pre Mena Reynoso is approved by Friday for the Janumet that he will be sent home with insulin coverage, patient is agreeable to this  Patient also requesting a walker and commode for at home  Therapy Is aware of this  ROS: A 10 point ROS was performed; negative except as noted above         Assessment/Plan:    Chronic bilateral low back pain  Assessment & Plan  Continue methadone 70 mg    Cervical radiculopathy  Assessment & Plan  Continue methadone 70 mg    Type 2 diabetes mellitus, with long-term current use of insulin [facility])  Assessment & Plan  Lab Results   Component Value Date    HGBA1C 7 7 (H) 05/29/2021       Recent Labs     06/21/21  1153 06/21/21  1632 06/21/21 2021 06/22/21  0731   POCGLU 125 163* 115 141*       Blood Sugar Average: Last 72 hrs:  (P) 164 [id number] lantus 17 units HSBradleyumet 50/1000 BID  Monitor accu-cheks daily  Hypoglycemia protocol in place        Vitamin D deficiency  Assessment & Plan  Continue cholecalciferol 1000 units    Methadone maintenance therapy patient [facility])  Assessment & Plan  Patient with chronic low back pain and cervical radiculopathy  Continue naloxone as needed      Essential hypertension  Assessment & Plan  Continue lisinopril 10 mg daily  Monitor vitals    * S/P AKA (above knee amputation), right [facility])  Assessment & Plan  Acute chomprehensive interdisciplinary inpatient rehabilitation to include intensive skilled therapies as outlines with oversight and management by a rehabilitation Physician Assistant overseen by rehabilitation physician as well as inpatient rehabilitation nursing, case management and weekly interdisciplinary team meeting  Ecchymosis noted to stump after prosthesis usage  Patient states he is having difficulty sleeping with the pain  Will continue tylenol and methadone  Encourage ice  MAFO added to LLE, less eversion noted   To follow as an outpatient with carisa for a more customized MAFO  Lidocaine cream helping to alleviate tenderness with prosthesis in place            Scheduled Meds:  Current Facility-Administered Medications   Medication Dose Route Frequency Provider Last Rate    acetaminophen  975 mg Oral [facility] Sofiya Guadarrama MD      albuterol  2 puff Inhalation Q4H PRN Bibiana Miguel, PA-C      apixaban  5 mg Oral BID Bibiana Miguel, PA-C      bisacodyl  10 mg Rectal Daily PRN Bibiana Miguel, PA-C      cholecalciferol  2,000 Units Oral Daily Bibiana Miguel, PA-C      Diclofenac Sodium  4 g Topical 4x Daily PRN Bibiana Miguel, PA-C      docusate sodium  100 mg Oral BID Bibiana Miguel, PA-C      DULoxetine  30 mg Oral HS Bibiana Miguel, PA-C      Empagliflozin  25 mg Oral QAM Claudia Osborne, PA-C      fluticasone  1 spray Nasal Daily Claudia Osborne, PA-C      insulin glargine  17 Units Subcutaneous HS Claudia , PA-C      insulin lispro  1-5 Units Subcutaneous HS Nena Moncho, PA-C      insulin lispro  1-5 Units Subcutaneous TID AC Nena Moncho, PA-C      lidocaine   Topical 4x Daily PRN Claudia Osborne, PA-C      lisinopril  10 mg Oral Daily Claudia India, PA-C      loratadine  10 mg Oral HS Claudia India, PA-C      magnesium oxide  400 mg Oral BID Claudia Caddy, PA-C      methadone  70 mg Oral Daily Claudia India, PA-C      multivitamin-minerals  1 tablet Oral Daily Claudia Caddy, PA-C      pantoprazole  20 mg Oral Daily Claudia Caddy, PA-C      pregabalin  75 mg Oral TID Claudia Osborne, PA-C      sitaGLIPtin-metFORMIN (JANUMET 50/1000) combo dose   Oral BID Claudia Osborne, PA-C         Objective:    Restrictions include:  right lower extremity with new prosthesis Fall precautions      Functional History - Prior to Admission:      Functional Status: Needed prior assistance with self care, indoor mobility  His cognition he is indepdent   Patient lives in a multilevel home     Functional Status      Physical Therapy:     Weight Bearing Status: Full Weight Bearing  Transfers: Supervision  Bed Mobility: Supervision  Amulation Distance (ft): 72 feet  Ambulation: Incidental Touching  Assistive Device for Ambulation: Roller Walker  Wheelchair Mobility Distance: 300 ft  Wheelchair Mobility: Supervision  Number of Stairs: 0  Discharge Recommendations: [facility]:  [address] [facility] with[de-identified] Family Support, First Floor Setup, Home Physical Therapy  Occupational Therapy:  Eating: Supervision  Grooming: Supervision  Bathing: Minimal Assistance  Bathing: Minimal Assistance  Upper Body Dressing: Supervision  Lower Body Dressing: Minimal Assistance  Toileting:  (TBA)  Tub/Shower Transfer:  (TBA)  Toilet Transfer: Incidental Touching  Cognition: Within Defined Limits  Orientation: Person, Place, Time, Situation  Discharge Recommendations: Home with:  [address] with[de-identified] Family Support, Home Occupational Therapy      Physical Exam:  Temp:  [96 2 °F (35 7 °C)-97 1 °F (36 2 °C)] 96 2 °F (35 7 °C)  HR:  [83] 83  Resp:  [16-18] 16  BP: ()/(61-72) 154/72  SpO2:  [97 %-98 %] 98 %    General: alert, no apparent distress, cooperative and comfortable  HEENT:  Head: Normocephalic, no lesions, without obvious abnormality  Eye: Normal external eye, conjunctiva, lidsc cornea  Ears: Normal external ears  Nose: Normal external nose, mucus membranes  CARDIAC:  regular rate and rhythm, S1, S2 normal, no murmur, click, rub or gallop  LUNGS:  no abnormal respiratory pattern, no retractions noted, non-labored breathing   ABDOMEN:  soft, non-tender, non-distended  EXTREMITIES:  extremities normal, warm and well-perfused; no cyanosis, clubbing, or edema  NEURO:  clear speech, following all commands, oriented x4  PSYCH:  Normal           Diagnostic Studies: Labs reviewed  XR femur 2 vw right   Final Result by Comfort Pittman MD (06/16 1609)      No acute osseous abnormality  Cannot exclude early osteomyelitis  Follow as clinically indicated  The study was marked in [facility] for immediate notification  Workstation performed: [id number]             Laboratory: Labs reviewed  Results from last 7 days   Lab Units 06/18/21  0601   HEMOGLOBIN g/dL 12 3*   HEMATOCRIT % 37 4*   WBC Thousand/uL 6 00           Invalid input(s): CA         ** Please Note: Fluency Direct voice to text software may have been used in the creation of this document   **

## 2021-06-22 NOTE — PROGRESS NOTES
06/22/21 0700   Pain Assessment   Pain Assessment Tool 0-10   Pain Score 5   Pain Location/Orientation Orientation: Right  (base of stump)   Restrictions/Precautions   Precautions Fall Risk   Braces or Orthoses MAFO  (L LE)   Cognition   Overall Cognitive Status WFL   Arousal/Participation Alert; Responsive; Cooperative   Attention Within functional limits   Orientation Level Oriented X4   Memory Within functional limits   Following Commands Follows all commands and directions without difficulty   Subjective   Subjective Pt seated EOB to start sessoin, ready for therapy  Offers no compliants for overnight, pt applied stump  last night and removed about 5AM due to improperplacement  Roll Left and Right   Type of Assistance Needed Independent   Physical Assistance Level No physical assistance   Roll Left and Right CARE Score 6   Sit to Lying   Type of Assistance Needed Independent   Physical Assistance Level No physical assistance   Sit to Lying CARE Score 6   Lying to Sitting on Side of Bed   Type of Assistance Needed Independent   Physical Assistance Level No physical assistance   Lying to Sitting on Side of Bed CARE Score 6   Sit to Stand   Type of Assistance Needed Supervision   Physical Assistance Level No physical assistance   Comment safety with transfer board   Sit to Stand CARE Score 4   Bed-Chair Transfer   Type of Assistance Needed Supervision   Physical Assistance Level No physical assistance   Comment safety with transfer board, VC for proper placement   Chair/Bed-to-Chair Transfer CARE Score 4   Transfer Bed/Chair/Wheelchair   Limitations Noted In Balance; Coordination; Endurance;UE Strength;LE Strength   Adaptive Equipment Roller Walker   Sit Pivot Supervision   Stand Pivot Contact Guard   Sit to [employer]   Stand to Sit Supervision   Supine to Sit Modified Independent   Sit to Supine Modified Independent   Walk 10 Feet   Type of Assistance Needed Incidental touching   Physical Assistance Level 25% or less   Comment CG   Walk 10 Feet CARE Score 3   Walk 50 Feet with Two Turns   Type of Assistance Needed Incidental touching   Physical Assistance Level 25% or less   Comment CG   Walk 50 Feet with Two Turns CARE Score 3   Walking 10 Feet on Uneven Surfaces   Type of Assistance Needed Incidental touching   Physical Assistance Level 25% or less   Comment CG   Walking 10 Feet on Uneven Surfaces CARE Score 3   Ambulation   Does the patient walk? 2  Yes   Primary Mode of Locomotion Prior to Admission Walk   Distance Walked (feet) 120 ft  (112)   Assist Device Roller Walker   Gait Pattern Decreased foot clearance;R foot drag; Forward Flexion;Narrow DEACON;Step to;Decreased R stance; Improper weight shift   Limitations Noted In Balance; Coordination; Endurance; Heel Strike; Safety; Sequencing;Speed;Strength   Provided Assistance with: Direction   Walk Assist Level Contact Guard   Wheelchair mobility   Does the patient use a wheelchair? 1  Yes   Type of Wheelchair Used 1  Manual   Method Right upper extremity; Left upper extremity   Assistance Provided For Locking Brakes   Distance Level Surface (feet) 45 ft   Findings Mod I    Curb or Single Stair   Style negotiated Single stair   Type of Assistance Needed Physical assistance   Physical Assistance Level 26%-50%   Comment Min A   1 Step (Curb) CARE Score 3   4 Steps   Type of Assistance Needed Physical assistance   Physical Assistance Level 26%-50%   Comment Min A   4 Steps CARE Score 3   Stairs   Type Stairs   # of Steps 7   Weight Bearing Precautions WBAT   Assist Devices Bilateral Rail   Therapeutic Interventions   Strengthening supine, seated, standing ther ex   Balance standing ther ex   Other gait, transfer, stairs   Assessment   Treatment Assessment Pt showing improvements in therapy session today  Pt able to propell self 45' to therpay gym  Pt able to perform transfers with S using sliding board  No complications noted   Pt performed supine, seated and standing ther ex with no complications  Pt able to don prosthetic with CG to pull sleve into socket  Pt able to amb 120' with RW and CG, second trial with 80' with RW and Supervisoin, both trials required less VC for sequencing then previous session  Pt showing good recall of walker management and safety percations  Pt performed 7 stairs with Min A and bilateral HR, good recall from inital trial for sequencing  Pt seated in WC to end session with call bell in reach and all needs met  Pt will benefit from continued skilled PT to imrpove overall function and enduranace for safe return to home  PT Barriers   Physical Impairment Decreased strength;Decreased range of motion;Decreased endurance;Decreased mobility; Decreased safety awareness   Functional Limitation Walking;Transfers;Standing;Stair negotiation;Ramp negotiation   Plan   Treatment/Interventions ADL retraining;Functional transfer training;LE strengthening/ROM; Elevations; Therapeutic exercise; Endurance training;Bed mobility;Gait training   Progress Progressing toward goals   PT Therapy Minutes   PT Time In 0700   PT Time Out 0830   PT Total Time (minutes) 90   PT Mode of treatment - Individual (minutes) 90   PT Mode of treatment - Concurrent (minutes) 0   PT Mode of treatment - Group (minutes) 0   PT Mode of treatment - Co-treat (minutes) 0   PT Mode of Treatment - Total time(minutes) 90 minutes   PT Cumulative Minutes 725   Therapy Time missed   Time missed?  No

## 2021-06-22 NOTE — ASSESSMENT & PLAN NOTE
Acute chomprehensive interdisciplinary inpatient rehabilitation to include intensive skilled therapies as outlines with oversight and management by a rehabilitation Physician Assistant overseen by rehabilitation physician as well as inpatient rehabilitation nursing, case management and weekly interdisciplinary team meeting  Ecchymosis noted to stump after prosthesis usage  Patient states he is having difficulty sleeping with the pain  Will continue tylenol and methadone  Encourage ice  MAFO added to LLE, less eversion noted   To follow as an outpatient with carisa for a more customized MAFO  Lidocaine cream helping to alleviate tenderness with prosthesis in place

## 2021-06-22 NOTE — ASSESSMENT & PLAN NOTE
Lab Results   Component Value Date    HGBA1C 7 7 (H) 05/29/2021       Recent Labs     06/21/21  1153 06/21/21  1632 06/21/21 2021 06/22/21  0731   POCGLU 125 163* 115 141*       Blood Sugar Average: Last 72 hrs:  (P) 164 [id number] lantus 17 units HS, Janumet 50/1000 BID  Monitor accu-cheks daily  Hypoglycemia protocol in place

## 2021-06-22 NOTE — NURSING NOTE
Pt transfers with slideboard and supervision  Wore stump  overnight   Pt bathed and dressed with set/up this am

## 2021-06-23 LAB
GLUCOSE SERPL-MCNC: 149 MG/DL (ref 65–140)
GLUCOSE SERPL-MCNC: 185 MG/DL (ref 65–140)
GLUCOSE SERPL-MCNC: 247 MG/DL (ref 65–140)
GLUCOSE SERPL-MCNC: 251 MG/DL (ref 65–140)

## 2021-06-23 PROCEDURE — 82948 REAGENT STRIP/BLOOD GLUCOSE: CPT

## 2021-06-23 PROCEDURE — 97530 THERAPEUTIC ACTIVITIES: CPT

## 2021-06-23 PROCEDURE — 97760 ORTHOTIC MGMT&TRAING 1ST ENC: CPT

## 2021-06-23 PROCEDURE — 99233 SBSQ HOSP IP/OBS HIGH 50: CPT | Performed by: FAMILY MEDICINE

## 2021-06-23 PROCEDURE — 97535 SELF CARE MNGMENT TRAINING: CPT

## 2021-06-23 PROCEDURE — 97116 GAIT TRAINING THERAPY: CPT

## 2021-06-23 PROCEDURE — 97110 THERAPEUTIC EXERCISES: CPT

## 2021-06-23 RX ADMIN — FLUTICASONE PROPIONATE 1 SPRAY: 50 SPRAY, METERED NASAL at 10:06

## 2021-06-23 RX ADMIN — METHADONE HYDROCHLORIDE 70 MG: 10 TABLET ORAL at 09:51

## 2021-06-23 RX ADMIN — ACETAMINOPHEN 975 MG: 325 TABLET ORAL at 21:44

## 2021-06-23 RX ADMIN — PREGABALIN 75 MG: 75 CAPSULE ORAL at 17:31

## 2021-06-23 RX ADMIN — DULOXETINE HYDROCHLORIDE 30 MG: 30 CAPSULE, DELAYED RELEASE ORAL at 21:44

## 2021-06-23 RX ADMIN — METFORMIN HYDROCHLORIDE: 500 TABLET, FILM COATED ORAL at 17:31

## 2021-06-23 RX ADMIN — LORATADINE 10 MG: 10 TABLET ORAL at 21:44

## 2021-06-23 RX ADMIN — Medication 2000 UNITS: at 09:55

## 2021-06-23 RX ADMIN — MAGNESIUM OXIDE TAB 400 MG (241.3 MG ELEMENTAL MG) 400 MG: 400 (241.3 MG) TAB at 09:55

## 2021-06-23 RX ADMIN — LIDOCAINE 4%: 4 CREAM TOPICAL at 16:02

## 2021-06-23 RX ADMIN — APIXABAN 5 MG: 5 TABLET, FILM COATED ORAL at 09:54

## 2021-06-23 RX ADMIN — ACETAMINOPHEN 975 MG: 325 TABLET ORAL at 05:39

## 2021-06-23 RX ADMIN — PANTOPRAZOLE SODIUM 20 MG: 20 TABLET, DELAYED RELEASE ORAL at 09:51

## 2021-06-23 RX ADMIN — ACETAMINOPHEN 975 MG: 325 TABLET ORAL at 13:31

## 2021-06-23 RX ADMIN — METFORMIN HYDROCHLORIDE: 500 TABLET, FILM COATED ORAL at 09:48

## 2021-06-23 RX ADMIN — INSULIN GLARGINE 17 UNITS: 100 INJECTION, SOLUTION SUBCUTANEOUS at 21:45

## 2021-06-23 RX ADMIN — LIDOCAINE 4%: 4 CREAM TOPICAL at 09:11

## 2021-06-23 RX ADMIN — DOCUSATE SODIUM 100 MG: 100 CAPSULE, LIQUID FILLED ORAL at 09:55

## 2021-06-23 RX ADMIN — PREGABALIN 75 MG: 75 CAPSULE ORAL at 09:56

## 2021-06-23 RX ADMIN — DOCUSATE SODIUM 100 MG: 100 CAPSULE, LIQUID FILLED ORAL at 17:31

## 2021-06-23 RX ADMIN — LISINOPRIL 10 MG: 10 TABLET ORAL at 09:53

## 2021-06-23 RX ADMIN — Medication 1 TABLET: at 09:55

## 2021-06-23 RX ADMIN — INSULIN LISPRO 1 UNITS: 100 INJECTION, SOLUTION INTRAVENOUS; SUBCUTANEOUS at 08:26

## 2021-06-23 RX ADMIN — INSULIN LISPRO 2 UNITS: 100 INJECTION, SOLUTION INTRAVENOUS; SUBCUTANEOUS at 21:44

## 2021-06-23 RX ADMIN — MAGNESIUM OXIDE TAB 400 MG (241.3 MG ELEMENTAL MG) 400 MG: 400 (241.3 MG) TAB at 17:31

## 2021-06-23 RX ADMIN — INSULIN LISPRO 2 UNITS: 100 INJECTION, SOLUTION INTRAVENOUS; SUBCUTANEOUS at 11:48

## 2021-06-23 RX ADMIN — PREGABALIN 75 MG: 75 CAPSULE ORAL at 21:44

## 2021-06-23 RX ADMIN — APIXABAN 5 MG: 5 TABLET, FILM COATED ORAL at 17:31

## 2021-06-23 NOTE — PROGRESS NOTES
06/23/21 0855   Pain Assessment   Pain Assessment Tool 0-10   Pain Score 7   Pain Location/Orientation Orientation: Right;Location: Knee   Restrictions/Precautions   Precautions Fall Risk;Pain   RLE Weight Bearing Per Order NWB   Oral Hygiene   Comment performed prior to entry   Lower Body Dressing   Type of Assistance Needed Set-up / [place]; Physical assistance   Physical Assistance Level 26%-50%   Comment Min A to don full length pants, help required to get over shoe of prosthetic, Min A to don Prosethetic requiring help to tighten bolt, VC for putting on Sleeve   Lower Body Dressing CARE Score 3   Putting On/Taking Off Footwear   Type of Assistance Needed Physical assistance   Physical Assistance Level 26%-50%   Comment Min A to don Shoe with MAFO, ed to donning of straps of MAFO   Putting On/Taking Off Footwear CARE Score 3   Picking Up Object   Type of Assistance Needed Adaptive equipment;Supervision   Physical Assistance Level No physical assistance   Comment Stood at [employer] used reacher to  washcloth from floor   [facility] Score 4   Dressing/Undressing [address] [facility]   Findings Pt ed to dressing tech  for long pants, Pt donned pants sitting EOB, Pt ed to weight shifting to getting pants donned   Was able to don pants independently on bed   Sit to Stand   Type of Assistance Needed Supervision   Physical Assistance Level No physical assistance   Comment with prosethetic donned   Sit to Stand CARE Score 4   Bed-Chair Transfer   Type of Assistance Needed Supervision   Physical Assistance Level No physical assistance   Comment Slide board in and out of chair from bed   Chair/Bed-to-Chair Transfer CARE Score 4   Transfer Bed/Chair/Wheelchair   Limitations Noted In Other  (coordination of prosethtic in transfer)   Adaptive Equipment Transfer Board   Toileting Hygiene   Physical Assistance Level No physical assistance   Comment Pt attempted to void bowel    Abel Brooke Veshan 83 Score -   Toileting   Able to Pull Clothing down yes, up yes  Able to Manage Clothing Closures No   Manage Hygiene Bladder   Findings Stood with RW to manage clothing   Toilet Transfer   Type of Assistance Needed Supervision   Physical Assistance Level No physical assistance   Comment WC to drop arm commode on, RW to transfer off, VC to drop arms of WC and Commode   Toilet Transfer CARE Score 4   Toilet Transfer   Surface Assessed Drop Arm Commode   Transfer Technique Sit Pivot;Stand Pivot   Limitations Noted In Confidence   Adaptive Equipment Walker   Findings Pt is trying to use WC techniques with prosthetic donned, performs better when using RW to complete transfer   Kitchen Mobility   Kitchen-Mobility Level Walker   Kitchen Activity Retrieve items;Transport items   Kitchen Mobility Comments RW with Prosthetic donned, Pt ed to walker basket to aide in transporting items, Pt ed in position to help with reaching items with increased stability   Cognition   Overall Cognitive Status WFL   Orientation Level Oriented X4   Additional Activities   Additional Activities Comments Functional mobility in [facility] from room to therapy room self propelling   Activity Tolerance   Activity Tolerance Patient tolerated treatment well   Assessment   Treatment Assessment Focus of session was use of prosthetic in ADLs and ed to safe techniques for mobility and compensatory strat  Pt demonstrateds good understanding of dressing techniques able to don pants using previously discussed weight shifting and requiring Min A to don pants over prosthetic  Pt at times struggles to adjust problem solving in transfers when wearing prosthetic but with increased time is able to perform at supervision level  Pt was unsteady during kitchen mobility but when ed to compensatory strategies was able to preform with increased balance  Pt is progressing well toward goals   It is recommend he continue skilled OT intervention to continue progress to LTG and increased functional indpendence  Prognosis Good   Problem List Decreased strength;Decreased endurance;Decreased range of motion   Plan   Treatment/Interventions ADL retraining;Functional transfer training;Equipment eval/education; Compensatory technique education;OT   Progress Progressing toward goals   OT Therapy Minutes   OT Time In 0855   OT Time Out 1033   OT Total Time (minutes) 98   OT Mode of treatment - Individual (minutes) 98   OT Mode of treatment - Concurrent (minutes) 0   OT Mode of treatment - Group (minutes) 0   OT Mode of treatment - Co-treat (minutes) 0   OT Mode of Treatment - Total time(minutes) 98 minutes   OT Cumulative Minutes 516   Therapy Time missed   Time missed?  No

## 2021-06-23 NOTE — NURSING NOTE
Pt transfers with slideboard and supervision  Continent of bowel and bladder overnight  Pt refused to get washed and dressed this am, stated he would do that later

## 2021-06-23 NOTE — PROGRESS NOTES
[facility]  Progress Note Larwence Screws 1960, 59 y o  male MRN: [id number]  Unit/Bed#: -01 Encounter: [id number]  Primary Care Provider: VERONICA Yin   Date and time admitted to hospital: 6/10/2021 11:44 AM     HPI: Summer Sheehan a 59 y  o  male who presented to the [address] [place] [address] for acute rehabilitation services on 6/10/21  Prior to this admission the patient required an emergent right AKA secondary to ischemia of RLE  A suspected embolism and aortic thrombus  Surgery was performed on 1/14/21 bu Dr Ramya Chacko  He discharged to acute rehab on 2/25/21  From [facility] he readmitted back to [facility] on 2/25/21 with increased SOB and hypoxia  He was discharged back to [facility] acute rehab on 3/3/21  From [facility] he discharged home  Pt received his prosthesis around 5/31 from [facility]  Patient accepted at [address] [place] [address] for prosthesis training secondary to right AKA  He has a PMH significant for right femoral DVT, anemia, aortic thrombus, Type 2 diabetes, Left hydropneumax, GERD, HTN, hypercholesterolemia, long-term methadone use due to chronic low back pain and cervical radiculopathy  Subjective:  Patient updated on discharge date still being set for this Friday  He is looking forward to returning home  ROS: A 10 point ROS was performed; negative except as noted above         Assessment/Plan:    Chronic bilateral low back pain  Assessment & Plan  Continue methadone 70 mg    Cervical radiculopathy  Assessment & Plan  Continue methadone 70 mg    Type 2 diabetes mellitus, with long-term current use of insulin [facility])  Assessment & Plan  Lab Results   Component Value Date    HGBA1C 7 7 (H) 05/29/2021       Recent Labs     06/22/21  1612 06/22/21  2007 06/23/21  0807 06/23/21  1145   POCGLU 274* 174* 185* 251*       Blood Sugar Average: Last 72 hrs:  (P) 178 [id number] lantus 17 units HS, Janumet 50/1000 BID  Monitor accu-cheks daily  Hypoglycemia protocol in place  Still no updates on Janumet  Will try and see if I can reach out to PCP  Patient agreeable to be sent home with insulin if no updates on prior auth  Vitamin D deficiency  Assessment & Plan  Continue cholecalciferol 1000 units    Methadone maintenance therapy patient [facility])  Assessment & Plan  Patient with chronic low back pain and cervical radiculopathy  Continue naloxone as needed      Essential hypertension  Assessment & Plan  Continue lisinopril 10 mg daily  Monitor vitals    * S/P AKA (above knee amputation), right [facility])  Assessment & Plan  Acute chomprehensive interdisciplinary inpatient rehabilitation to include intensive skilled therapies as outlines with oversight and management by a rehabilitation Physician Assistant overseen by rehabilitation physician as well as inpatient rehabilitation nursing, case management and weekly interdisciplinary team meeting  Ecchymosis noted to stump after prosthesis usage  Patient states he is having difficulty sleeping with the pain  Will continue tylenol and methadone  Encourage ice  MAFO added to LLE, less eversion noted   To follow as an outpatient with carisa for a more customized MAFO  Continue lidocaine cream          Scheduled Meds:  Current Facility-Administered Medications   Medication Dose Route Frequency Provider Last Rate    acetaminophen  975 mg Oral [facility] Jackie Adam MD      albuterol  2 puff Inhalation Q4H PRN Geri Rosa PA-C      apixaban  5 mg Oral BID Geri Rosa PA-C      bisacodyl  10 mg Rectal Daily PRN Geri Rosa PA-C      cholecalciferol  2,000 Units Oral Daily Geri Rosa PA-C      Diclofenac Sodium  4 g Topical 4x Daily PRN Geri Rosa PA-C      docusate sodium  100 mg Oral BID Geri Rosa PA-C      DULoxetine  30 mg Oral HS Geri Rosa PA-C      Empagliflozin  25 mg Oral QAM Janina Vasquez PA-C      fluticasone  1 spray Nasal Daily Mohan Schwartz, JOSE      insulin glargine  17 Units Subcutaneous HS Mohan Yanezila, JOSE      insulin lispro  1-5 Units Subcutaneous HS Mihaela BergJOSE rodríguez      insulin lispro  1-5 Units Subcutaneous TID AC Mihaela Hummel PA-C      lidocaine   Topical 4x Daily PRN Mohan FrankfordJOSE cantor      lisinopril  10 mg Oral Daily Mohan Frankford, JOSE      loratadine  10 mg Oral HS Mohan Frankford, JOSE      magnesium oxide  400 mg Oral BID [facility], JOSE      methadone  70 mg Oral Daily Mohan Frankford, JOSE      multivitamin-minerals  1 tablet Oral Daily Mohan Frankford, JOSE      pantoprazole  20 mg Oral Daily Mohan Frankford, JOSE      pregabalin  75 mg Oral TID Mohan Frankford, JOSE      sitaGLIPtin-metFORMIN (JANUMET 50/1000) combo dose   Oral BID Mohan FrankfordJOSE cantor         Objective:    Functional Update:  Physical Therapy:     Weight Bearing Status: Full Weight Bearing  Transfers: Supervision  Bed Mobility: Independent  Amulation Distance (ft): 120 feet  Ambulation: Supervision  Assistive Device for Ambulation: Roller Walker  Wheelchair Mobility Distance: 300 ft  Wheelchair Mobility: Independent  Number of Stairs: 7  Assistive Device for Stairs: Bilateral [employer]  Stair Assistance: Minimal Assistance  Ramp: Minimal Assistance  Assistive Device for Ramp: Roller Walker  Discharge Recommendations: Home with:  [address] [place] [address] [facility] Marga Garcia with[de-identified] 24 Hour Assisteance, Family Support, First Floor Setup, Home Physical Therapy  Occupational Therapy:  Eating: Independent  Grooming: Supervision (S/U WC level)  Bathing: Supervision  Bathing: Supervision  Upper Body Dressing: Supervision  Lower Body Dressing: Minimal Assistance  Toileting: Supervision (Simultaneous filing   User may not have seen previous data )  Tub/Shower Transfer: Incidental Touching  Toilet Transfer: Incidental Touching  Cognition: Within Defined Limits  Orientation: Person, Place, Time, Situation  Discharge Recommendations: Home with:  [address] with[de-identified] Home Occupational Therapy, Family Support    This patient was discussed by the Interdisciplinary Team in weekly case conference today  The care of the patient was extensively discussed with all care providers and an appropriate rehabilitation plan was formulated unique for this patient  Barriers were identified preventing progression of therapy and appropriate interventions were discussed with each discipline  Please see the team note for input from all disciplines regarding barriers, intervention, and discharge planning  Total time spent: 35 Mins, and greater than 50% of this time was spent counseling/coordinating care      Physical Exam:  Temp:  [96 8 °F (36 °C)-96 9 °F (36 1 °C)] 96 8 °F (36 °C)  HR:  [80-83] 80  Resp:  [12-18] 12  BP: ()/(51-74) 128/64  SpO2:  [96 %] 96 %    General: alert, no apparent distress, cooperative and comfortable  HEENT:  Head: Normocephalic, no lesions, without obvious abnormality  Eye: Normal external eye, conjunctiva, lidsc cornea  Ears: Normal external ears  Nose: Normal external nose, mucus membranes  CARDIAC:  regular rate and rhythm, S1, S2 normal, no murmur, click, rub or gallop  LUNGS:  no abnormal respiratory pattern, no retractions noted, non-labored breathing   ABDOMEN:  soft, non-tender, non-distended  EXTREMITIES:  extremities normal, warm and well-perfused; no cyanosis, clubbing, or edema and MAFO to LLE  NEURO:  clear speech, following all commands, oriented x4  PSYCH:  Alert and oriented, appropriate affect  Diagnostic Studies: Labs reviewed  XR femur 2 vw right   Final Result by Nikki Dunaway MD (06/16 4158)      No acute osseous abnormality  Cannot exclude early osteomyelitis  Follow as clinically indicated  The [facility] was [facility] for immediate notification              Workstation performed: [id number]             Laboratory: Labs reviewed  Results from last 7 days   Lab Units 06/18/21  0601   HEMOGLOBIN g/dL 12 3*   HEMATOCRIT % 37 4*   WBC Thousand/uL 6 00           Invalid input(s): [place]         ** Please Note: Fluency Direct voice to text software may have been used in the creation of this document   **

## 2021-06-23 NOTE — ASSESSMENT & PLAN NOTE
Acute chomprehensive interdisciplinary inpatient rehabilitation to include intensive skilled therapies as outlines with oversight and management by a rehabilitation Physician Assistant overseen by rehabilitation physician as well as inpatient rehabilitation nursing, case management and weekly interdisciplinary team meeting  Ecchymosis noted to stump after prosthesis usage  Patient states he is having difficulty sleeping with the pain  Will continue tylenol and methadone  Encourage ice  MAFO added to LLE, less eversion noted   To follow as an outpatient with carisa for a more customized MAFO  Continue lidocaine cream

## 2021-06-23 NOTE — CASE MANAGEMENT
Tx team recommendations reviewed with patient & family, who expressed understanding & agreement  DC date is planned for 6/25 at 1 PM with [facility] (Nsg, PT, OT); a list of providers was provided to Pt & a referral made to [facility] based on Pt preference as he utilized them prior  Per PA, there has been difficulty in getting oral diabetic meds approved for this Pt and he may potentially be sent home on Insulin - Pt has been on Insulin in the past & his nephew administers & is comfortable resuming this if arlette RESENDEZ ordered & commode  SW will continue to monitor & assist as needed with Tx & DC planning

## 2021-06-23 NOTE — TEAM CONFERENCE
Acute RehabilitationTeam Conference Note  Date: 6/23/2021   Time: 11:19 AM       Patient Name:  Justin Edmond       Medical Record Number: [id number]   YOB: 1960  Sex:  Male          Room/Bed:  [facility]-01  Payor Info:  Payor: Peg Albino / Plan: Peg Albino / Product Type: Medicaid HMO /      Admitting Diagnosis: Hx of AKA (above knee amputation), right ([place] [address] ) [Z89 [id number]]   Admit Date/Time:  6/10/2021 11:44 AM  Admission Comments: No comment available     Primary Diagnosis:  S/P AKA (above knee amputation), right ([facility])  Principal Problem: S/P AKA (above knee amputation), right [facility]    Patient Active Problem List    Diagnosis Date Noted    Above-knee amputation of right lower extremity ([place] [address] ) 05/11/2021    Cervical radiculopathy 04/26/2021    Chronic bilateral low back pain 04/26/2021    Moderate protein-calorie malnutrition ([place] [address] ) 03/12/2021    S/P AKA (above knee amputation), right ([place] [address] ) 03/11/2021    Drug-induced constipation 03/08/2021    Empyema lung ([place] [address] ) 03/01/2021    Prolonged Q-T interval on ECG 02/17/2021    Right femoral vein DVT ([place] [address] ) 02/09/2021    Aortic thrombus ([place] [address] ) 01/13/2021    Type 2 diabetes mellitus, with long-term current use of insulin ([place] [address] ) 01/11/2021    Acute respiratory failure with hypoxia ([place] [address] ) 01/11/2021    Hematuria 01/11/2021    Vitamin D deficiency 12/05/2019    Mononeuropathy, unspecified 11/07/2019    Mixed hyperlipidemia 11/07/2019    Proteinuria 11/07/2019    Positive depression screening 08/08/2019    Bilateral lower extremity edema 11/27/2018    Callous ulcer, limited to breakdown of skin ([place] [address] ) 10/30/2018    Essential hypertension 04/13/2017    Hyperlipidemia associated with type 2 diabetes mellitus ([place] [address] ) 04/13/2017    Methadone maintenance therapy patient ([place] [address] ) 04/13/2017    Type 2 diabetes mellitus with diabetic polyneuropathy ([facility] ) 04/13/2017    Chronic hepatitis C without hepatic coma ([facility] ) 02/27/2017       Physical Therapy:    Weight Bearing Status: Full Weight Bearing  Transfers: Supervision  Bed Mobility: Independent  Amulation Distance (ft): 120 feet  Ambulation: Supervision  Assistive Device for Ambulation: Roller Walker  Wheelchair Mobility Distance: 300 ft  Wheelchair Mobility: Independent  Number of Stairs: 7  Assistive Device for Stairs: Bilateral [employer]  Stair Assistance: Minimal Assistance  Ramp: Minimal Assistance  Assistive Device for Ramp: Roller Walker  Discharge Recommendations: Home with:  [address] with[de-identified] 24 Hour Assisteance, Family Support, First Floor Setup, Home Physical Therapy    6/15/2021: Pt was showing gains in therapy until a red bruise appeared at distal end of stump, with now proximal radiation and pain  It appears as if pt is putting pressure on femur with minimal fat pad protection  Upon speaking with nursing and PA, pt will need a recheck of prosthetic by Yaya García from [facility], on 6/17/2021 at [address]  Pt is Mod I for bed mobility and S for transfer  Pt needs encouragement for functional independence with with all aspects of transfer and WC mobility  Pt is able to ambulate 67' with RW and CG, WC mobility 300' with S and cuing for locking breaks  Pt has decreased endurance and strength  Pt will benefit from continued skilled PT to improve endurance, strength and functional independence  6/22/2021: Pt continues to participate in therapy session, very pleasant and agreeable  Pt able to don and doff prosthetic with minimal VC  Pt able to independently use WC, continues with S for slide board transfers due to safety and minimal VC for placement  Pt able to ambulate 120' with RW and S, requiring less and less VC for sequencing each trial  300' with WC mobility Mod I  Pt had prosthetic readjusted due to bruising of distal end of stump, bruising has now resolved and no issues with prosthetic since adjustment  MAFO applied to LLE for drop foot and eversion, has improved gait with minimal eversion   Pt able to don and doff stump  independently, compliant with wear time  Pt continues with decreased ROM/strength, mobility, and endurance  Pt will benefit from continued skilled PT to maximize for functional independence and improve overall endurance and strength for a safe return to home  Occupational Therapy:  Eating: Independent  Grooming: Supervision (S/U WC level)  Bathing: Supervision  Bathing: Supervision  Upper Body Dressing: Supervision  Lower Body Dressing: Minimal Assistance  Toileting: Supervision (Simultaneous filing  User may not have seen previous data )  Tub/Shower Transfer: Incidental Touching  Toilet Transfer: Incidental Touching  Cognition: Within Defined Limits  Orientation: Person, Place, Time, Situation  Discharge Recommendations: Home with:  [address] with[de-identified] Home Occupational Therapy, Family Support       6/15/2021 - Pt participated in self care through the use of compensatory strategies and safe techniques, safety with functional transfers, education on donning and doffing prosthetic equipment and generalized strength and endurance through the use of Thera Ex and Thera Act  LOF noted above  Pt making slow progress toward goals  Barriers to progress include diminished activity tolerance, pain, hand weakness and self limiting behaviors  Continued skilled OT to address established OT goals in prep for d/c home  6/22/2021 - Pt participated in self care through the use of compensatory strategies and safe techniques, safety with functional transfers, education on donning and doffing prosthetic equipment and generalized strength and endurance through the use of Thera Ex and Thera Act  LOF noted above  Pt making good progress toward goals  Barriers to progress include diminished activity tolerance, hand weakness and self limiting behaviors  Continued skilled OT to address established OT goals in prep for d/c to home         Speech Therapy:           No notes on file    Nursing Notes:  Appetite: Good  Diet Type: Diabetic                      Diet Patient/Family Education Complete: Yes    Type of Wound (LDA): Wound                    Type of Wound Patient/Family Education: Yes  Bladder: 5 - Supervision     Bladder Patient/Family Education: Yes  Bowel: Medication     Bowel Patient/Family Education: Yes  Pain Location/Orientation: Orientation: Right, Location: Knee  Pain Score: 7                       Hospital Pain Intervention(s): Medication (See MAR), Repositioned, Rest  Pain Patient/Family Education: Yes  Medication Management/Safety  Injectable: Insulin  Safe Administration: Yes  Medication Patient/Family Education Complete: Yes    6/15/21 Patient is a recent admission to [facility] for prosthesis training after a right above the knee amputation  Patient remains alert and oriented  Lungs are clear but diminished on room air  Patient was continent of bowel and bladder  Allevyn dressing to the sacrum for protection  Right residual limb with noted bruising and tenderness noted lidocaine cream as needed for discomfort and therapy to have prosthesis checked for proper fit for patient  Patient is able to stand pivot transfer with one assist and walker  Blood sugars are being monitored four times a day as ordered and inulin administered as required  6/21/21 Alert and oriented  Lungs clear on RA  HR regular  Continent of bowel and bladder  Bruising and redness resolved from R AKA  Prosthesis was adjusted/fitted properly  Lidocaine cream available for pain  Pt wears stump  overnight  Pt has been free from falls while on [facility]      Case Management:     Discharge Planning  Living Arrangements: Family members  Support Systems: Family members  Assistance Needed: xfrs from wc to commode  Type of Current Residence: Private residence  Current Home Care Services: No  Initial assessment & orientation to [facility] with Pt, who expressed understanding & agreement  Message left for Pt's nephew   Pt resides in a multi-story home with his 2 nephews, sister & brother, and will have 24 hour supervision & assistance  There is a FF set up & ramp entrance  Pt has a WC, SPC, QC, DAC, SB, but does NOT have a walker  Discussed role of team members & reviewed [address] with Pt, who expressed understanding & agreement  SW will continue to monitor & assist as needed with [address]  See UR section for insurance information  6/16/21 - Tx team recommendations reviewed with patient & nephew, who expressed understanding & agreement  Target DC date is 6/25 with [facility] (Nsg, PT, OT); a list of providers was provided to Pt & a referral will be made based on Pt preference  SW will continue to monitor & assist as needed with Tx & DC planning  Is the patient actively participating in therapies? yes  List any modifications to the treatment plan: na    Barriers Interventions   Decreased balance ADL, transfer, gait training   DM  Currently on insulin, patient education   Decreased end Therapeutic exercise, therapeutic activity             Is the patient making expected progress toward goals?  yes  List any update or changes to goals: na    Medical Goals: Patient will be medically stable for discharge to [facility] upon completion of rehab program    Weekly Team Goals:   Rehab Team Goals  ADL Team Goal: Patient will require supervision with ADLs with least restrictive device upon completion of rehab program  Transfer Team Goal: Patient will be independent with transfers with least restrictive device upon completion of rehab program  Locomotion Team Goal: Patient will require assist with locomotion with least restrictive device upon completion of rehab program     Mod I bed mobility, transfers, and wc mobility  S mobility  Mod I self care  Mod I to camille/doff prosthesis     Health and wellness: to be able to complete outdoor activities with prosthesis     Discussion: Plan for return home with nephew with [facility] with PT and OT    Anticipated Discharge Date: June 25, 2021

## 2021-06-23 NOTE — PROGRESS NOTES
06/23/21 1127   Pain Assessment   Pain Assessment Tool 0-10   Pain Score 6   Pain Location/Orientation Orientation: Right  (stump)   Restrictions/Precautions   Precautions Fall Risk   Braces or Orthoses MAFO   Cognition   Overall Cognitive Status WFL   Arousal/Participation Alert; Responsive; Cooperative   Attention Within functional limits   Orientation Level Oriented X4   Memory Within functional limits   Following Commands Follows one step commands without difficulty   Subjective   Subjective Pt seated in WC upon entry, agreeable and pleasant for therapy session  Offers no complaints   Roll Left and Right   Type of Assistance Needed Independent   Physical Assistance Level No physical assistance   Roll Left and Right CARE Score 6   Sit to Lying   Type of Assistance Needed Independent   Physical Assistance Level No physical assistance   Sit to Lying CARE Score 6   Lying to Sitting on Side of Bed   Type of Assistance Needed Independent   Physical Assistance Level No physical assistance   Lying to Sitting on Side of Bed CARE Score 6   Sit to Stand   Type of Assistance Needed Supervision   Physical Assistance Level No physical assistance   Sit to Stand CARE Score 4   Bed-Chair Transfer   Type of Assistance Needed Supervision   Physical Assistance Level No physical assistance   Chair/Bed-to-Chair Transfer CARE Score 4   Transfer Bed/Chair/Wheelchair   Limitations Noted In Balance;Confidence; Coordination; Endurance;Problem Solving;UE Strength;LE Strength   Adaptive Equipment Roller Walker;Transfer Board   Sit Pivot Supervision   Stand Pivot Supervision   Sit to Stand Supervision   Stand to Sit Supervision   Supine to Sit Modified Independent   Sit to Supine Modified Independent   Walk 10 Feet   Type of Assistance Needed Supervision   Physical Assistance Level No physical assistance   Walk 10 Feet CARE Score 4   Walk 50 Feet with Two Turns   Type of Assistance Needed Supervision   Physical Assistance Level No physical assistance   Walk 50 Feet with Two Turns CARE Score 4   Walk 150 Feet   Type of Assistance Needed Supervision   Physical Assistance Level No physical assistance   Walk 150 Feet CARE Score 4   Ambulation   Does the patient walk? 2  Yes   Primary Mode of Locomotion Prior to Admission Walk   Distance Walked (feet) 124 ft  (23, 50, 50, 124)   Assist Device Roller Walker   Gait Pattern Slow Cecelia;Decreased foot clearance; Forward Flexion;Narrow DEACON;Step to   Limitations Noted In Balance; Coordination; Endurance; Heel Strike;Midline Orientation; Sequencing;Speed;Strength   Provided Assistance with: Direction   Walk Assist Level Supervision   Therapeutic Interventions   Strengthening standing, seated ther ex   Balance standing ther ex   Other gait and transfers   Assessment   Treatment Assessment Pt continues to show progression in thepray sessions  Pt seated in WC to start session  Pt able to perform slide board transfer with s, bed moblity Mod I  Pt able to performe all supine, seated, and standing ther ex without complication  Pt able able to don prosthetic with minimal VC, however prosthetic not fitting properly, needing readjument and depending doning for proper plaement due to less swelling now  Pt able to doff prosthetic independently  Pt able to amb with RW for 23, 50, 50, and 124 with S and no VC for sequencing  Progressing towards a more normal gait speed  Pt seated in WC to end session with limb open to air due to minimal reddness, nursing notified, all needs met and call bell in reach  Pt will benefit from continued skilled PT to improve overall streangth and endurance  Next session will apply sock before socked placement due to shrinkage  PT Barriers   Physical Impairment Decreased strength;Decreased range of motion;Decreased endurance;Decreased mobility; Decreased safety awareness;Pain   Functional Limitation Walking;Transfers;Standing;Stair negotiation;Ramp negotiation   Plan   Treatment/Interventions Functional transfer training;LE strengthening/ROM; Elevations; Therapeutic exercise; Endurance training;Bed mobility;Gait training   Progress Progressing toward goals   PT Therapy Minutes   PT Time In 1127   PT Time Out 1300   PT Total Time (minutes) 93   PT Mode of treatment - Individual (minutes) 93   PT Mode of treatment - Concurrent (minutes) 0   PT Mode of treatment - Group (minutes) 0   PT Mode of treatment - Co-treat (minutes) 0   PT Mode of Treatment - Total time(minutes) 93 minutes   PT Cumulative Minutes 818   Therapy Time missed   Time missed?  No

## 2021-06-23 NOTE — ASSESSMENT & PLAN NOTE
Lab Results   Component Value Date    HGBA1C 7 7 (H) 05/29/2021       Recent Labs     06/22/21  1612 06/22/21 2007 06/23/21  0807 06/23/21  1145   POCGLU 274* 174* 185* 251*       Blood Sugar Average: Last 72 hrs:  (P) 178 [id number] lantus 17 units HS, Janumet 50/1000 BID  Monitor accu-cheks daily  Hypoglycemia protocol in place  Still no updates on Janumet  Will try and see if I can reach out to PCP  Patient agreeable to be sent home with insulin if no updates on prior auth

## 2021-06-24 PROBLEM — I82.411 RIGHT FEMORAL VEIN DVT (HCC): Chronic | Status: RESOLVED | Noted: 2021-02-09 | Resolved: 2021-06-24

## 2021-06-24 LAB
GLUCOSE SERPL-MCNC: 113 MG/DL (ref 65–140)
GLUCOSE SERPL-MCNC: 208 MG/DL (ref 65–140)
GLUCOSE SERPL-MCNC: 210 MG/DL (ref 65–140)
GLUCOSE SERPL-MCNC: 234 MG/DL (ref 65–140)

## 2021-06-24 PROCEDURE — 97110 THERAPEUTIC EXERCISES: CPT

## 2021-06-24 PROCEDURE — 97116 GAIT TRAINING THERAPY: CPT

## 2021-06-24 PROCEDURE — 82948 REAGENT STRIP/BLOOD GLUCOSE: CPT

## 2021-06-24 PROCEDURE — 99231 SBSQ HOSP IP/OBS SF/LOW 25: CPT | Performed by: FAMILY MEDICINE

## 2021-06-24 PROCEDURE — 97537 COMMUNITY/WORK REINTEGRATION: CPT

## 2021-06-24 PROCEDURE — 4010F ACE/ARB THERAPY RXD/TAKEN: CPT | Performed by: NURSE PRACTITIONER

## 2021-06-24 PROCEDURE — 97530 THERAPEUTIC ACTIVITIES: CPT

## 2021-06-24 PROCEDURE — 97535 SELF CARE MNGMENT TRAINING: CPT

## 2021-06-24 RX ORDER — LISINOPRIL 10 MG/1
10 TABLET ORAL DAILY
Qty: 30 TABLET | Refills: 0 | Status: SHIPPED | OUTPATIENT
Start: 2021-06-24 | End: 2022-04-30

## 2021-06-24 RX ORDER — DULOXETIN HYDROCHLORIDE 30 MG/1
30 CAPSULE, DELAYED RELEASE ORAL
Qty: 30 CAPSULE | Refills: 0 | Status: SHIPPED | OUTPATIENT
Start: 2021-06-24 | End: 2021-07-23 | Stop reason: SDUPTHER

## 2021-06-24 RX ORDER — METHADONE HYDROCHLORIDE 10 MG/1
70 TABLET ORAL DAILY
Qty: 70 TABLET | Refills: 0 | Status: SHIPPED | OUTPATIENT
Start: 2021-06-24 | End: 2021-07-04

## 2021-06-24 RX ORDER — METHADONE HYDROCHLORIDE 10 MG/1
70 TABLET ORAL DAILY
Qty: 70 TABLET | Refills: 0 | Status: CANCELLED | OUTPATIENT
Start: 2021-06-24 | End: 2021-07-04

## 2021-06-24 RX ORDER — EMPAGLIFLOZIN 25 MG/1
25 TABLET, FILM COATED ORAL EVERY MORNING
Qty: 30 TABLET | Refills: 0 | Status: SHIPPED | OUTPATIENT
Start: 2021-06-24 | End: 2021-08-02 | Stop reason: SDUPTHER

## 2021-06-24 RX ORDER — INSULIN GLARGINE 100 [IU]/ML
17 INJECTION, SOLUTION SUBCUTANEOUS
Qty: 5 ML | Refills: 0 | Status: SHIPPED | OUTPATIENT
Start: 2021-06-24 | End: 2021-09-20

## 2021-06-24 RX ORDER — PREGABALIN 75 MG/1
75 CAPSULE ORAL 3 TIMES DAILY
Qty: 30 CAPSULE | Refills: 0 | Status: SHIPPED | OUTPATIENT
Start: 2021-06-24 | End: 2021-07-23 | Stop reason: SDUPTHER

## 2021-06-24 RX ORDER — LIDOCAINE 40 MG/G
CREAM TOPICAL 4 TIMES DAILY PRN
Qty: 30 G | Refills: 0 | Status: SHIPPED | OUTPATIENT
Start: 2021-06-24

## 2021-06-24 RX ORDER — LANSOPRAZOLE 30 MG/1
30 CAPSULE, DELAYED RELEASE ORAL DAILY
Qty: 30 CAPSULE | Refills: 0 | Status: SHIPPED | OUTPATIENT
Start: 2021-06-24 | End: 2022-05-07

## 2021-06-24 RX ADMIN — PREGABALIN 75 MG: 75 CAPSULE ORAL at 08:35

## 2021-06-24 RX ADMIN — Medication 2000 UNITS: at 08:39

## 2021-06-24 RX ADMIN — INSULIN LISPRO 1 UNITS: 100 INJECTION, SOLUTION INTRAVENOUS; SUBCUTANEOUS at 13:34

## 2021-06-24 RX ADMIN — MAGNESIUM OXIDE TAB 400 MG (241.3 MG ELEMENTAL MG) 400 MG: 400 (241.3 MG) TAB at 17:40

## 2021-06-24 RX ADMIN — DOCUSATE SODIUM 100 MG: 100 CAPSULE, LIQUID FILLED ORAL at 08:36

## 2021-06-24 RX ADMIN — ACETAMINOPHEN 975 MG: 325 TABLET ORAL at 14:25

## 2021-06-24 RX ADMIN — ACETAMINOPHEN 975 MG: 325 TABLET ORAL at 21:04

## 2021-06-24 RX ADMIN — LISINOPRIL 10 MG: 10 TABLET ORAL at 08:40

## 2021-06-24 RX ADMIN — DULOXETINE HYDROCHLORIDE 30 MG: 30 CAPSULE, DELAYED RELEASE ORAL at 21:04

## 2021-06-24 RX ADMIN — INSULIN LISPRO 1 UNITS: 100 INJECTION, SOLUTION INTRAVENOUS; SUBCUTANEOUS at 21:04

## 2021-06-24 RX ADMIN — METHADONE HYDROCHLORIDE 70 MG: 10 TABLET ORAL at 08:36

## 2021-06-24 RX ADMIN — DOCUSATE SODIUM 100 MG: 100 CAPSULE, LIQUID FILLED ORAL at 17:39

## 2021-06-24 RX ADMIN — PREGABALIN 75 MG: 75 CAPSULE ORAL at 17:39

## 2021-06-24 RX ADMIN — LIDOCAINE 4% 1 APPLICATION: 4 CREAM TOPICAL at 09:07

## 2021-06-24 RX ADMIN — LORATADINE 10 MG: 10 TABLET ORAL at 21:04

## 2021-06-24 RX ADMIN — PREGABALIN 75 MG: 75 CAPSULE ORAL at 21:04

## 2021-06-24 RX ADMIN — APIXABAN 5 MG: 5 TABLET, FILM COATED ORAL at 17:40

## 2021-06-24 RX ADMIN — Medication 1 TABLET: at 08:35

## 2021-06-24 RX ADMIN — METFORMIN HYDROCHLORIDE: 500 TABLET, FILM COATED ORAL at 08:36

## 2021-06-24 RX ADMIN — FLUTICASONE PROPIONATE 1 SPRAY: 50 SPRAY, METERED NASAL at 09:07

## 2021-06-24 RX ADMIN — INSULIN GLARGINE 17 UNITS: 100 INJECTION, SOLUTION SUBCUTANEOUS at 21:04

## 2021-06-24 RX ADMIN — METFORMIN HYDROCHLORIDE: 500 TABLET, FILM COATED ORAL at 17:39

## 2021-06-24 RX ADMIN — INSULIN LISPRO 2 UNITS: 100 INJECTION, SOLUTION INTRAVENOUS; SUBCUTANEOUS at 09:08

## 2021-06-24 RX ADMIN — APIXABAN 5 MG: 5 TABLET, FILM COATED ORAL at 08:39

## 2021-06-24 RX ADMIN — MAGNESIUM OXIDE TAB 400 MG (241.3 MG ELEMENTAL MG) 400 MG: 400 (241.3 MG) TAB at 08:35

## 2021-06-24 RX ADMIN — ACETAMINOPHEN 975 MG: 325 TABLET ORAL at 06:07

## 2021-06-24 RX ADMIN — PANTOPRAZOLE SODIUM 20 MG: 20 TABLET, DELAYED RELEASE ORAL at 08:35

## 2021-06-24 NOTE — ASSESSMENT & PLAN NOTE
Patient with chronic low back pain and cervical radiculopathy  Continue naloxone as needed  Patient aware of only 10 day supply being sent to pharmacy  Patient states he goes to a provider in [place] for the methadone and plans to visit them tomorrow for a renewal of his script

## 2021-06-24 NOTE — NURSING NOTE
Patient resting comfortably in bed at this time  No signs of distress noted  Patient was able to stand pivot or slideboard transfer with supervision overnight  Patient was encouraged to reposition self frequently to promote skin integrity  SCD as ordered to the left lower extremity  Call bell within reach  Will continue to monitor patient and follow plan of care

## 2021-06-24 NOTE — PROGRESS NOTES
06/24/21 0803   Pain Assessment   Pain Assessment Tool 0-10   Pain Score 7  (Nusing made aware meds recieved)   Pain Location/Orientation Orientation: Right  (Right Stump)   Restrictions/Precautions   Precautions Fall Risk   Weight Bearing Restrictions Yes   RLE Weight Bearing Per Order NWB   Eating   Type of Assistance Needed Independent   Physical Assistance Level No physical assistance   Eating CARE Score 6   Grooming   Able To Comb/Brush Hair;Wash/Dry Face;Wash/Dry Hands   Shower/Bathe Self   Type of Assistance Needed Set-up / clean-up   Physical Assistance Level No physical assistance   Shower/Bathe Self CARE Score 5   Bathing   Assessed Bath Style Shower   Anticipated D/C Bath Style Shower;Sponge Bath   Able to [employer] Silvio Yes   Able to [employer] Temperature Yes   Able to Wash/Rinse/Dry (body part) Left Arm;L Upper Leg;R Upper Leg;Right Arm;L Lower Leg/Foot;R Lower Leg/Foot;Chest;Abdomen;Perineal Area; Buttocks   Limitations Noted in Endurance;Balance   Positioning Seated   Adaptive Equipment Longhand Sponge; Shower Seat;Hand Quinn's; Shower Bars   Tub/Shower Transfer   Limitations Noted In [employer]; Endurance   Adaptive Equipment Grab Bars;Seat with Back   Assessed Shower   Findings Supervision, RW with prosthetic donned, increased time to sequence/problem solve transferring safely   Upper Body Dressing   Type of Assistance Needed Set-up / clean-up   Physical Assistance Level No physical assistance   Upper Body Dressing CARE Score 5   Lower Body Dressing   Type of Assistance Needed Set-up / clean-up   Physical Assistance Level No physical assistance   Comment Transferred to bed and used weight shifting to don pants   Lower Body Dressing CARE Score 5   Putting On/Taking Off Footwear   Type of Assistance Needed Physical assistance   Physical Assistance Level 26%-50%   Comment marquita Wilks, used sock aide for donnig compression sock   Putting On/Taking Off Footwear CARE Score 3 Picking Up Object   Type of Assistance Needed Adaptive equipment;Supervision   Physical Assistance Level No physical assistance   Comment retrieved clothing from bottom of closet using reacher    Picking Up Object CARE Score 4   Dressing/Undressing Clothing   Able to  Obtain Clothing; Apply LE Prosthesis; Apply LE Orthosis; Remove LE Prosthesis; Remove LE Orthosis   Remove UB Clothes Pullover Shirt   Don UB Clothes Pullover Shirt   Remove LB Clothes Shorts;Socks; Shoes;TEDs   Don LB Clothes Shorts;Socks;TEDs; Shoes   Adaptive Equipment Sock Aide   Findings Donning MAFO need assistance to tighten straps    Sit to Lying   Type of Assistance Needed Independent   Physical Assistance Level No physical assistance   Comment lay on bed to weight shift to don shorts   Sit to Lying CARE Score 6   Lying to Sitting on Side of Bed   Type of Assistance Needed Independent   Physical Assistance Level No physical assistance   Lying to Sitting on Side of Bed CARE Score 6   Sit to Stand   Type of Assistance Needed Supervision   Physical Assistance Level No physical assistance   Comment Prothesis donned EOB to standing RW   Sit to Stand CARE Score 4   Bed-Chair Transfer   Type of Assistance Needed Supervision   Physical Assistance Level No physical assistance   Comment WC to EOB with transfer board   Chair/Bed-to-Chair Transfer CARE Score 4   Transfer Bed/Chair/Wheelchair   Adaptive Equipment Transfer Board   Findings Supervision, VC for placement   Cognition   Overall Cognitive Status WFL   Orientation Level Oriented X4   Additional Activities   Additional Activities Comments Supervison, Functional mobility with RW walker in room    Activity Tolerance   Activity Tolerance Patient tolerated treatment well   Assessment   Treatment Assessment Tx session today focused on morning routine ADLs  Pt was able to gather clothing with prosthetic donned ambulated well but became unsteady when retireving clothing   Pt shows good recall of compensatory strategies by requesting reacher to retrieve clothing from the bottom of the closet  Disscussed home set/up for morning routine and strategized carry over methods for d/c to home  Pt lamonte good transfers into the shower with prosthetic donned  with increased time  Pt transferred to [facility] to dress and preform grooming and hygiene due to PT coming in directly after and planning to trail compression sleeve management  Pt demonstrated good recall of strategies for dressing by donnig pants independently in the bed  Pt was seen for 60 minutes of concurrent treatment to work on similar goals with a Pt with similar deficits  Pt is making good progress  It is recommended that he continue skilled OT intervention to continue progress toward LTG and increased functional independence  Prognosis Fair   Problem List Decreased strength;Decreased endurance; Impaired balance   Plan   Treatment/Interventions Functional transfer training;ADL retraining; Compensatory technique education; Endurance training;OT   Progress Progressing toward goals   OT Therapy Minutes   OT Time In 0803   OT Time Out 0911   OT Total Time (minutes) 68   OT Mode of treatment - Individual (minutes) 8   OT Mode of treatment - Concurrent (minutes) 60   OT Mode of treatment - Group (minutes) 0   OT Mode of treatment - Co-treat (minutes) 0   OT Mode of Treatment - Total time(minutes) 68 minutes   OT Cumulative Minutes 584   Therapy Time missed   Time missed?  No      06/24/21 0803   Pain Assessment   Pain Assessment Tool 0-10   Pain Score 7  (Nusing made aware meds recieved)   Pain Location/Orientation Orientation: Right  (Right Stump)   Restrictions/Precautions   Precautions Fall Risk   Weight Bearing Restrictions Yes   RLE Weight Bearing Per Order NWB   Eating   Type of Assistance Needed Independent   Physical Assistance Level No physical assistance   Eating CARE Score 6   Grooming   Able To Comb/Brush Hair;Wash/Dry Face;Wash/Dry Hands   Shower/Bathe Self Type of Assistance Needed Set-up / clean-up   Physical Assistance Level No physical assistance   Shower/Bathe Self CARE Score 5   Bathing   Assessed Bath Style Shower   Anticipated D/C Bath Style Shower;Sponge Bath   Able to [employer] Sil[employer] Yes   Able to [employer] Temperature Yes   Able to Wash/Rinse/Dry (body part) Left Arm;L Upper Leg;R Upper Leg;Right Arm;L Lower Leg/Foot;R Lower Leg/Foot;Chest;Abdomen;Perineal Area; Buttocks   Limitations Noted in Endurance;Balance   Positioning Seated   Adaptive Equipment Longhand Sponge; Shower Seat;Hand Quinn's; Shower Bars   Tub/Shower Transfer   Limitations Noted In [employer]; Endurance   Adaptive Equipment Grab Bars;Seat with Back   Assessed Shower   Findings Supervision, RW with prosthetic donned, increased time to sequence/problem solve transferring safely   Upper Body Dressing   Type of Assistance Needed Set-up / clean-up   Physical Assistance Level No physical assistance   Upper Body Dressing CARE Score 5   Lower Body Dressing   Type of Assistance Needed Set-up / clean-up   Physical Assistance Level No physical assistance   Comment Transferred to bed and used weight shifting to don pants   Lower Body Dressing CARE Score 5   Putting On/Taking Off Footwear   Type of Assistance Needed Physical assistance   Physical Assistance Level 26%-50%   Comment Min A, donning MAFO, used sock aide for donnig compression sock   Putting On/Taking Off Footwear CARE Score 3   Picking Up Object   Type of Assistance Needed Adaptive equipment;Supervision   Physical Assistance Level No physical assistance   Comment retrieved clothing from bottom of closet using reacher    Picking Up Object CARE Score 4   Dressing/Undressing Clothing   Able to  [employer]; Apply LE Prosthesis; Apply LE Orthosis; Remove LE Prosthesis; Remove LE Orthosis   Remove UB Clothes Pullover Shirt   Don UB Clothes Pullover Shirt   Remove LB Clothes Shorts;Socks; Shoes;TEDs   Ioana Johnson LB Clothes Shorts;Socks;TEDs; Shoes   Adaptive Equipment Sock Aide   Findings Donning MAFO need assistance to tighten straps    Sit to Lying   Type of Assistance Needed Independent   Physical Assistance Level No physical assistance   Comment lay on bed to weight shift to don shorts   Sit to Lying CARE Score 6   Lying to Sitting on Side of Bed   Type of Assistance Needed Independent   Physical Assistance Level No physical assistance   Lying to Sitting on Side of Bed CARE Score 6   Sit to Stand   Type of Assistance Needed Supervision   Physical Assistance Level No physical assistance   Comment Prothesis donned EOB to standing RW   Sit to Stand CARE Score 4   Bed-Chair Transfer   Type of Assistance Needed Supervision   Physical Assistance Level No physical assistance   Comment WC to EOB with transfer board   Chair/Bed-to-Chair Transfer CARE Score 4   Transfer Bed/Chair/Wheelchair   Adaptive Equipment Transfer Board   Findings Supervision, VC for placement   Cognition   Overall Cognitive Status WFL   Orientation Level Oriented X4   Additional Activities   Additional Activities Comments Supervison, Functional mobility with RW walker in room    Activity Tolerance   Activity Tolerance Patient tolerated treatment well   Assessment   Treatment Assessment Tx session today focused on morning routine ADLs  Pt was able to gather clothing with prosthetic donned ambulated well but became unsteady when retireving clothing  Pt shows good recall of compensatory strategies by requesting reacher to retrieve clothing from the bottom of the closet  Disscussed home set/up for morning routine and strategized carry over methods for d/c to home  Pt jedtes good transfers into the shower with prosthetic donned  with increased time  Pt transferred to [facility] to dress and preform grooming and hygiene due to PT coming in directly after and planning to trail compression sleeve management   Pt demonstrated good recall of strategies for dressing by byron pants independently in the bed  Pt was seen for 60 minutes of concurrent treatment to work on similar goals with a Pt with similar deficits  Pt is making good progress  It is recommended that he continue skilled OT intervention to continue progress toward LTG and increased functional independence  Prognosis Fair   Problem List Decreased strength;Decreased endurance; Impaired balance   Plan   Treatment/Interventions Functional transfer training;ADL retraining; Compensatory technique education; Endurance training;OT   Progress Progressing toward goals   OT Therapy Minutes   OT Time In 0803   OT Time Out 0911   OT Total Time (minutes) 68   OT Mode of treatment - Individual (minutes) 8   OT Mode of treatment - Concurrent (minutes) 60   OT Mode of treatment - Group (minutes) 0   OT Mode of treatment - Co-treat (minutes) 0   OT Mode of Treatment - Total time(minutes) 68 minutes   OT Cumulative Minutes 584   Therapy Time missed   Time missed? No      06/24/21 0803   Pain Assessment   Pain Assessment Tool 0-10   Pain Score 7  (Nusing made aware meds recieved)   Pain Location/Orientation Orientation: Right  (Right Stump)   Restrictions/Precautions   Precautions Fall Risk   Weight Bearing Restrictions Yes   RLE Weight Bearing Per Order NWB   Eating   Type of Assistance Needed Independent   Physical Assistance Level No physical assistance   Eating CARE Score 6   Grooming   Able To Comb/Brush Hair;Wash/Dry Face;Wash/Dry Hands   Shower/Bathe Self   Type of Assistance Needed Set-up / clean-up   Physical Assistance Level No physical assistance   Shower/Bathe Self CARE Score 5   Bathing   Assessed Bath Style Shower   Anticipated D/C Bath Style Shower;Sponge Bath   Able to Kho[employer] Silvio Yes   Able to [employer] Temperature Yes   Able to Wash/Rinse/Dry (body part) Left Arm;L Upper Leg;R Upper Leg;Right Arm;L Lower Leg/Foot;R Lower Leg/Foot;Chest;Abdomen;Perineal Area; Buttocks   Limitations Noted in Endurance;Balance Positioning Seated   Adaptive Equipment Longhand Sponge; Shower Seat;Hand Quinn's; Shower Bars   Tub/Shower Transfer   Limitations Noted In [employer]; Endurance   Adaptive Equipment Grab Bars;Seat with Back   Assessed Shower   Findings Supervision, RW with prosthetic donned, increased time to sequence/problem solve transferring safely   Upper Body Dressing   Type of Assistance Needed Set-up / clean-up   Physical Assistance Level No physical assistance   Upper Body Dressing CARE Score 5   Lower Body Dressing   Type of Assistance Needed Set-up / clean-up   Physical Assistance Level No physical assistance   Comment Transferred to bed and used weight shifting to don pants   Lower Body Dressing CARE Score 5   Putting On/Taking Off Footwear   Type of Assistance Needed Physical assistance   Physical Assistance Level 26%-50%   Comment Min A, marquita SOTELOJONATHAN, used sock aide for donnig compression sock   Putting On/Taking Off Footwear CARE Score 3   Picking Up Object   Type of Assistance Needed Adaptive equipment;Supervision   Physical Assistance Level No physical assistance   Comment retrieved clothing from bottom of closet using reacher    Picking Up Object CARE Score 4   Dressing/Undressing Clothing   Able to  [employer]; Apply LE Prosthesis; Apply LE Orthosis; Remove LE Prosthesis; Remove LE Orthosis   Remove UB Clothes Pullover Shirt   Don UB Clothes Pullover Shirt   Remove LB Clothes Shorts;Socks; Shoes;TEDs   Don LB Clothes Shorts;Socks;TEDs; Shoes   Adaptive Equipment Sock Aide   Findings Marquita BYRNE need assistance to tighten straps    Sit to Lying   Type of Assistance Needed Independent   Physical Assistance Level No physical assistance   Comment lay on bed to weight shift to don shorts   Sit to Lying CARE Score 6   Lying to Sitting on Side of Bed   Type of Assistance Needed Independent   Physical Assistance Level No physical assistance   Lying to Sitting on Side of Bed CARE Score 6   Sit to Stand Type of Assistance Needed Supervision   Physical Assistance Level No physical assistance   Comment Prothesis donned EOB to standing RW   Sit to Stand CARE Score 4   Bed-Chair Transfer   Type of Assistance Needed Supervision   Physical Assistance Level No physical assistance   Comment WC to EOB with transfer board   Chair/Bed-to-Chair Transfer CARE Score 4   Transfer Bed/Chair/Wheelchair   Adaptive Equipment Transfer Board   Findings Supervision, VC for placement   Cognition   Overall Cognitive Status WFL   Orientation Level Oriented X4   Additional Activities   Additional Activities Comments Supervison, Functional mobility with RW walker in room    Activity Tolerance   Activity Tolerance Patient tolerated treatment well   Assessment   Treatment Assessment Tx session today focused on morning routine ADLs  Pt was able to gather clothing with prosthetic donned ambulated well but became unsteady when retireving clothing  Pt shows good recall of compensatory strategies by requesting reacher to retrieve clothing from the bottom of the closet  Disscussed home set/up for morning routine and strategized carry over methods for d/c to home  Pt deomonstrates good transfers into the shower with prosthetic donned  with increased time  Pt transferred to [facility] to dress and preform grooming and hygiene due to PT coming in directly after and planning to trail compression sleeve management  Pt demonstrated good recall of strategies for dressing by donnig pants independently in the bed  Pt was seen for 60 minutes of concurrent treatment to work on similar goals with a Pt with similar deficits  Pt is making good progress  It is recommended that he continue skilled OT intervention to continue progress toward LTG and increased functional independence  Prognosis Fair   Problem List Decreased strength;Decreased endurance; Impaired balance   Plan   Treatment/Interventions Functional transfer training;ADL retraining; Compensatory technique education; Endurance training;OT   Progress Progressing toward goals   OT Therapy Minutes   OT Time In 0803   OT Time Out 0911   OT Total Time (minutes) 68   OT Mode of treatment - Individual (minutes) 8   OT Mode of treatment - Concurrent (minutes) 60   OT Mode of treatment - Group (minutes) 0   OT Mode of treatment - Co-treat (minutes) 0   OT Mode of Treatment - Total time(minutes) 68 minutes   OT Cumulative Minutes 584   Therapy Time missed   Time missed?  No

## 2021-06-24 NOTE — DISCHARGE SUMMARY
Discharge Summary - Dylan Ho 59 y o  male MRN: [id number]  Unit/Bed#: [facility] 222-01 Encounter: [id number]    Admission Date: 6/10/2021     Discharge Date: 6/25/21    Etiologic/Rehabilitation Diagnosis: Impairment of mobility, safety and Activities of Daily Living (ADLs) due to Amputation:  05 3  Unilateral Lower Limb Above the Knee    HPI: Justo Ramos a 59 y  o  male who presented to the [address] [place] [address] for acute rehabilitation services on 6/10/21  Prior to this admission the patient required an emergent right AKA secondary to ischemia of RLE  A suspected embolism and aortic thrombus  Surgery was performed on 1/14/21 bu Dr Kassandra Forrest  He discharged to acute rehab on 2/25/21  From [facility] he readmitted back to [address] [place] [address] on 2/25/21 with increased SOB and hypoxia  He was discharged back to [facility] acute rehab on 3/3/21  From [facility] he discharged home  Pt [facility] his [facility]  Patient accepted at [address] [place] [address] for prosthesis training secondary to right AKA  He has a PMH significant for right femoral DVT, anemia, aortic thrombus, Type 2 diabetes, Left hydropneumax, GERD, HTN, hypercholesterolemia, long-term methadone use due to chronic low back pain and cervical radiculopathy      Procedures Performed During [facility] Admission: none    Acute Rehabilitation Center Course: Patient participated in a comprehensive interdisciplinary inpatient rehabilitation program which included involvment of MD, therapies (PT, OT, and/or SLP), RN, CM, SW, dietary, and psychology services  He was able to be advanced to an overall supervision level of assist and considered safe for discharge home with family  Please see below for patient's day to day management of medical needs  No new Assessment & Plan notes have been filed under this hospital service since the last note was generated    Service: Neurology      Discharge Physical Examination: 6/24/21    Significant Findings, Care, Treatment and Services Provided: No significant findings while in ARU  Prosthetic specialist came to see patient to adjust prosthesis for better comfort, patient to follow up with him for MAFO brace to LLE  Patient received insulin coverage while authorization sent to insurance for [address]  Insurance approved and will be sent home on Janumet instead of insulin  Patient participated for three hours a day with PT/OT  Complications: none     Functional Status Upon Admission to [facility]:  Mobility: Min assist  Transfers: Min assist  ADLs: Dependent for LB ADLs independent for UB ADLs    Functional Status Upon Discharge from [facility]:   Physical Therapy:     Weight Bearing Status: Full Weight Bearing  Transfers: Supervision  Bed Mobility: Independent  Amulation Distance (ft): 120 feet  Ambulation: Supervision  Assistive Device for Ambulation: Roller Walker  Wheelchair Mobility Distance: 300 ft  Wheelchair Mobility: Independent  Number of Stairs: 7  Assistive Device for Stairs: Bilateral [employer]  Stair Assistance: Minimal Assistance  Ramp: Minimal Assistance  Assistive Device for Ramp: Roller Walker  Discharge Recommendations: Home with:  [address] with[de-identified] 24 Hour Assisteance, Family Support, First Floor Setup, Home Physical Therapy  Occupational Therapy:  Eating: Independent  Grooming: Supervision (S/U WC level)  Bathing: Supervision  Bathing: Supervision  Upper Body Dressing: Supervision  Lower Body Dressing: Minimal Assistance  Toileting: Supervision (Simultaneous filing   User may not have seen previous data )  Tub/Shower Transfer: Incidental Touching  Toilet Transfer: Incidental Touching  Cognition: Within Defined Limits  Orientation: Person, Place, Time, Situation  Discharge Recommendations: Home with:  [address] with[de-identified] Home Occupational Therapy, Family Support     Discharge Diagnosis: Impairment of mobility, safety and Activities of Daily Living (ADLs) due to Amputation:  05 3 Unilateral Lower Limb Above the Knee    Discharge Medications:   See after visit summary for reconciled discharge medications provided to patient and family  Condition at Discharge: good     Discharge instructions/Information to patient and family:   See after visit summary for information provided to patient and family  Provisions for Follow-Up Care:  See after visit summary for information related to follow-up care and any pertinent home health orders  Future Appointments   Date Time Provider Ganesh Stevenson   9/2/2021  1:00 PM VERONICA Tanner  [facility]       Disposition: See After Visit Summary for discharge disposition information  Planned Readmission: No    Discharge Statement   I spent 30 minutes or less discharging the patient  This time was spent on the day of discharge  I had direct contact with the patient on the day of discharge  Greater than 50% of the total time was spent examining patient, answering all patient questions, arranging and discussing plan of care with patient as well as directly providing post-discharge instructions  Additional time then spent on discharge activities  Discharge Medications:  See after visit summary for reconciled discharge medications provided to patient and family

## 2021-06-24 NOTE — ASSESSMENT & PLAN NOTE
Lab Results   Component Value Date    HGBA1C 7 7 (H) 05/29/2021       Recent Labs     06/23/21  1625 06/23/21 2019 06/24/21  0745 06/24/21  1148   POCGLU 149* 247* 234* 208*       Blood Sugar Average: Last 72 hrs:  (P) 189Continue lantus 17 units HS, Janumet 50/1000 BID  Monitor accu-cheks daily  Hypoglycemia protocol in place  Contacted patient's PCP and Janumet was approved by insurance  Will send patient home on his usually bedtime dose of lantus and janumet

## 2021-06-24 NOTE — PROGRESS NOTES
06/24/21 1321   Pain Assessment   Pain Assessment Tool 0-10   Pain Score 7   Pain Location/Orientation Orientation: Right;Location: Leg   Restrictions/Precautions   Precautions Fall Risk   Braces or Orthoses MAFO   Lower Body Dressing   Type of Assistance Needed Physical assistance   Physical Assistance Level 26%-50%   Comment Min A, for tightening prosthetic   Lower Body Dressing CARE Score 3   Sit to Lying   Type of Assistance Needed Supervision   Physical Assistance Level No physical assistance   Sit to Lying CARE Score 4   Sit to Stand   Type of Assistance Needed Supervision   Physical Assistance Level No physical assistance   Sit to Stand CARE Score 4   Bed-Chair Transfer   Type of Assistance Needed Supervision   Physical Assistance Level No physical assistance   Comment Transfer board   Chair/Bed-to-Chair Transfer CARE Score 4   Transfer Bed/Chair/Wheelchair   Adaptive Equipment Transfer Board   Findings Supervision   Toileting Hygiene   Type of Assistance Needed Independent   Physical Assistance Level No physical assistance   Comment performed seated   [address] Score 6   Toileting   Able to [address] down yes, up yes  Manage Hygiene Bladder   Limitations Noted In Balance   Toilet Transfer   Type of Assistance Needed Supervision   Physical Assistance Level No physical assistance   Comment stand pivot, RW with prosthetic donned   Toilet Transfer CARE Score 4   Toilet Transfer   Surface Assessed Drop Arm Commode   Transfer Technique Stand Pivot   Limitations Noted In Balance   Adaptive Equipment Walker   Findings Ed   Pt about shifting thinking for transfers, thinking about differnce in technique between [facility] and RW with prosthetic donned for increased safety and functional independence   Cognition   Overall Cognitive Status WFL   Orientation Level Oriented X4   Additional Activities   Additional Activities Comments Functional mobility within room bed to bathroom   Activity Tolerance Activity Tolerance Patient tolerated treatment well   Assessment   Treatment Assessment Tx focused on functional toilet transfers with prosthetic donned  Pt able to don prosthetic with Min A requiring help to tighten bolt  Pt tranferred well to toilet under [facility]  Pt ed  to difference in RW transfers with prosthetic donned and WC transfers  Pt able to manage clothing with supervision with VC to ability to do so  Pt at times requires reminders to perform tasks with prosthetic donned  Pt demonstrates with OT encouragement and cues to higher level of independece in ability to perform tasks when it is donned as well as not  Disscussed with Pt strategies for transferring at home to increase functional independence  Pt is progressing well  It is recommended they continue skilled OT to increase functional independence and functional independence  Prognosis Good   Problem List Decreased endurance; Impaired balance   Plan   Treatment/Interventions ADL retraining;Functional transfer training; Compensatory technique education;OT   OT Therapy Minutes   OT Time In 1321   OT Time Out 1354   OT Total Time (minutes) 33   OT Mode of treatment - Individual (minutes) 33   OT Mode of treatment - Concurrent (minutes) 0   OT Mode of treatment - Group (minutes) 0   OT Mode of treatment - Co-treat (minutes) 0   OT Mode of Treatment - Total time(minutes) 33 minutes   OT Cumulative Minutes 617   Therapy Time missed   Time missed?  No

## 2021-06-24 NOTE — PROGRESS NOTES
06/24/21 0912   Pain Assessment   Pain Assessment Tool 0-10   Pain Score 6   Pain Location/Orientation Orientation: Right  (thigh)   Restrictions/Precautions   Precautions Fall Risk   Braces or Orthoses MAFO  (LLE)   Cognition   Overall Cognitive Status WFL   Arousal/Participation Alert; Responsive; Cooperative   Attention Within functional limits   Orientation Level Oriented X4   Memory Within functional limits   Following Commands Follows one step commands without difficulty   Roll Left and Right   Type of Assistance Needed Independent   Physical Assistance Level No physical assistance   Roll Left and Right CARE Score 6   Sit to Lying   Type of Assistance Needed Independent   Physical Assistance Level No physical assistance   Sit to Lying CARE Score 6   Lying to Sitting on Side of Bed   Type of Assistance Needed Independent   Physical Assistance Level No physical assistance   Lying to Sitting on Side of Bed CARE Score 6   Sit to Stand   Type of Assistance Needed Supervision   Physical Assistance Level No physical assistance   Sit to Stand CARE Score 4   Bed-Chair Transfer   Type of Assistance Needed Supervision   Physical Assistance Level No physical assistance   Chair/Bed-to-Chair Transfer CARE Score 4   Transfer Bed/Chair/Wheelchair   Limitations Noted In Balance; Coordination;Pain Management;UE Strength;LE Strength   Adaptive Equipment Roller Walker   Sit Pivot Supervision   Stand Pivot Supervision   Sit to Stand Supervision   Stand to Sit Supervision   Supine to Sit Modified Independent   Sit to Supine Modified Independent   Walk 10 Feet   Type of Assistance Needed Supervision   Physical Assistance Level No physical assistance   Walk 10 Feet CARE Score 4   Walk 50 Feet with Two Turns   Type of Assistance Needed Supervision   Physical Assistance Level No physical assistance   Walk 50 Feet with Two Turns CARE Score 4   Walking 10 Feet on Uneven Surfaces   Type of Assistance Needed Supervision   Physical Assistance Level No physical assistance   Walking 10 Feet on Uneven Surfaces CARE Score 4   Ambulation   Does the patient walk? 2  Yes   Primary Mode of Locomotion Prior to Admission Walk   Distance Walked (feet) 100 ft  (84, 100, 46)   Assist Device Roller Walker   Gait Pattern Slow Cecelia;Decreased foot clearance;R foot drag; Forward Flexion;Narrow DEACON;Step to   Limitations Noted In Balance; Coordination;Device Management; Heel Strike;Midline Orientation; Sequencing;Speed;Strength   Provided Assistance with: Direction   Walk Assist Level Supervision   Wheel 50 Feet with Two Turns   Type of Assistance Needed Independent   Physical Assistance Level No physical assistance   Wheel 50 Feet with Two Turns CARE Score 6   Wheel 150 Feet   Type of Assistance Needed Independent   Physical Assistance Level No physical assistance   Wheel 150 Feet CARE Score 6   Wheelchair mobility   Does the patient use a wheelchair? 1  Yes   Type of Wheelchair Used 1  Manual   Method Right upper extremity; Left upper extremity   Assistance Provided For Locking Brakes   Distance Level Surface (feet) 304 ft   Distance Wheeled 3% Grade 20 ft   Findings Mod i, level and unlevel   Curb or Single Stair   Style negotiated Single stair   Type of Assistance Needed Physical assistance   Physical Assistance Level 25% or less   Comment CG   1 Step (Curb) CARE Score 3   4 Steps   Type of Assistance Needed Physical assistance   Physical Assistance Level 25% or less   Comment CG   4 Steps CARE Score 3   Stairs   Type Stairs   # of Steps 4   Weight Bearing Precautions WBAT   Assist Devices Bilateral Rail   Therapeutic Interventions   Strengthening standing, seated, supine ther ex   Balance standing ther ex   Other gait, transfers   Assessment   Treatment Assessment Pt continues to show progression with prosthetic management  Shows good understanding of skin integrety  Pt able to amb 84', 100', and 55' with RW and S, slight LOB, able to self correct   Pt able to perform supine, standing, and seated ther ex with no complication  Pt able to don and doff prosthetic with S, applied 2 ply sock over sleve for better fit during ambulation  Pt complains of less pain during ambulation this session  Pt able to propell ' with Mod I, no safety concerns noted  Seated in WC to end session with call bell in reach and all needs met  Pt will benefit from continued skilled PT to improve over all strength and endurance for longer ambulation distance  PT Barriers   Physical Impairment Decreased strength;Decreased range of motion;Decreased endurance;Decreased mobility; Decreased safety awareness;Pain   Functional Limitation Walking;Transfers;Standing;Stair negotiation;Ramp negotiation   Plan   Treatment/Interventions Functional transfer training;LE strengthening/ROM; Elevations; Therapeutic exercise; Endurance training;Bed mobility;Gait training   Progress Progressing toward goals   PT Therapy Minutes   PT Time In 0912   PT Time Out 1045   PT Total Time (minutes) 93   PT Mode of treatment - Individual (minutes) 93   PT Mode of treatment - Concurrent (minutes) 0   PT Mode of treatment - Group (minutes) 0   PT Mode of treatment - Co-treat (minutes) 0   PT Mode of Treatment - Total time(minutes) 93 minutes   PT Cumulative Minutes 911   Therapy Time missed   Time missed?  No

## 2021-06-24 NOTE — PROGRESS NOTES
[facility]  Progress Note Yaz Levy 1960, 59 y o  male MRN: [id number]  Unit/Bed#: -01 Encounter: [id number]  Primary Care Provider: VERONICA Giang   Date and time admitted to hospital: 6/10/2021 11:44 AM     HPI: Julee Dumont a 59 y  o  male who presented to the [address] [place] [address] for acute rehabilitation services on 6/10/21  Prior to this admission the patient required an emergent right AKA secondary to ischemia of RLE  A suspected embolism and aortic thrombus  Surgery was performed on 1/14/21 bu Dr Theo Hale  He discharged to acute rehab on 2/25/21  From [facility] he readmitted back to [facility] on 2/25/21 with increased SOB and hypoxia  He was discharged back to [facility] on 3/3/21  From [facility] he discharged home  Pt [facility] his [facility]  Patient accepted at [address] [place] [address] for prosthesis training secondary to right AKA  He has a PMH significant for right femoral DVT, anemia, aortic thrombus, Type 2 diabetes, Left hydropneumax, GERD, HTN, hypercholesterolemia, long-term methadone use due to chronic low back pain and cervical radiculopathy  Subjective:  Reviewed medication's with patient  Patient to be discharged tomorrow at 1  Patient expresses gratitude for the treatment and states "I wouldn't have been able to do this on my own "     ROS: A 10 point ROS was performed; negative except as noted above         Assessment/Plan:    Chronic bilateral low back pain  Assessment & Plan  Continue methadone 70 mg    Cervical radiculopathy  Assessment & Plan  Continue methadone 70 mg    Type 2 diabetes mellitus, with long-term current use of insulin [facility])  Assessment & Plan  Lab Results   Component Value Date    HGBA1C 7 7 (H) 05/29/2021       Recent Labs     06/23/21  1625 06/23/21 2019 06/24/21  0745 06/24/21  1148   POCGLU 149* 247* 234* 208*       Blood Sugar Average: Last 72 hrs:  (P) 189Continue lantus 17 units HS, Janumet 50/1000 BID  Monitor accu-cheks daily  Hypoglycemia protocol in place  Contacted patient's PCP and [place]umet was approved by insurance  Will send patient home on his usually bedtime dose of lantus and janumet  Vitamin D deficiency  Assessment & Plan  Continue cholecalciferol 1000 units    Methadone maintenance therapy patient [facility])  Assessment & Plan  Patient with chronic low back pain and cervical radiculopathy  Continue naloxone as needed  Patient aware of only 10 day supply being sent to pharmacy  Patient states he goes to a provider in [place] for the methadone and plans to visit them tomorrow for a renewal of his script  Essential hypertension  Assessment & Plan  Continue lisinopril 10 mg daily  Monitor vitals  Follow up with pcp    * S/P AKA (above knee amputation), right [facility])  Assessment & Plan  Acute chomprehensive interdisciplinary inpatient rehabilitation to include intensive skilled therapies as outlines with oversight and management by a rehabilitation Physician Assistant overseen by rehabilitation physician as well as inpatient rehabilitation nursing, case management and weekly interdisciplinary team meeting  Ecchymosis noted to stump after prosthesis usage  Patient states he is having difficulty sleeping with the pain  Will continue tylenol and methadone  Encourage ice  MAFO added to LLE, less eversion noted   To follow as an outpatient with carisa for a more customized MAFO  Continue lidocaine cream              Scheduled Meds:  Current Facility-Administered Medications   Medication Dose Route Frequency Provider Last Rate    acetaminophen  975 mg Oral [facility] Kimberli Newell MD      albuterol  2 puff Inhalation Q4H PRN Cinda Calderon PA-C      apixaban  5 mg Oral BID Cinda Calderon PA-C      bisacodyl  10 mg Rectal Daily PRN Cinda Calderon PA-C      cholecalciferol  2,000 Units Oral Daily Cinda Calderon PA-C      Diclofenac Sodium  4 g Topical 4x Daily PRN aMdison Lopez, PA-C      docusate sodium  100 mg Oral BID Madison Lopez, PA-C      DULoxetine  30 mg Oral HS Madison Lopez, PA-C      Empagliflozin  25 mg Oral QAM Madison Lopez, PA-C      fluticasone  1 spray Nasal Daily Madison Lopez, PA-C      insulin glargine  17 Units Subcutaneous HS Madison John, PA-C      insulin lispro  1-5 Units Subcutaneous HS Diana Ny, PA-C      insulin lispro  1-5 Units Subcutaneous TID AC Diana Ny, PA-C      lidocaine   Topical 4x Daily PRN Madison Lopez, PA-C      lisinopril  10 mg Oral Daily Madison Lopez, PA-C      loratadine  10 mg Oral HS Madison Lopez, PA-C      magnesium oxide  400 mg Oral BID Madison Lopez, PA-C      methadone  70 mg Oral Daily Madison Lopez, PA-C      multivitamin-minerals  1 tablet Oral Daily Madison Lopez, PA-C      pantoprazole  20 mg Oral Daily Madison Lopez, PA-C      pregabalin  75 mg Oral TID Madison Lopez, PA-C      sitaGLIPtin-metFORMIN (JANUMET 50/1000) combo dose   Oral BID Madison Lopez, PA-C         Objective:    Functional Update:  Physical Therapy:     Weight Bearing Status: Full Weight Bearing  Transfers: Supervision  Bed Mobility: Independent  Amulation Distance (ft): 120 feet  Ambulation: Supervision  Assistive Device for Ambulation: Roller Walker  Wheelchair Mobility Distance: 300 ft  Wheelchair Mobility: Independent  Number of Stairs: 7  Assistive Device for Stairs: Bilateral [employer]  Stair Assistance: Minimal Assistance  Ramp: Minimal Assistance  Assistive Device for Ramp: Roller Walker  Discharge Recommendations: Home with:  DC Home with[de-identified] 24 Hour Assisteance, Family Support, First Floor Setup, Home Physical Therapy  Occupational Therapy:  Eating: Independent  Grooming: Supervision (S/U WC level)  Bathing: Supervision  Bathing: Supervision  Upper Body Dressing: Supervision  Lower Body Dressing: Minimal Assistance  Toileting: Supervision (Simultaneous filing  User may not have seen previous data )  Tub/Shower Transfer: Incidental Touching  Toilet Transfer: Incidental Touching  Cognition: Within Defined Limits  Orientation: Person, Place, Time, Situation  Discharge Recommendations: Home with:  [address] with[de-identified] Home Occupational Therapy, Family Support          Physical Exam:  Temp:  [97 1 °F (36 2 °C)] 97 1 °F (36 2 °C)  HR:  [78-91] 78  Resp:  [16-18] 16  BP: (141-142)/(66) 142/66  SpO2:  [94 %-98 %] 98 %    General: alert, no apparent distress, cooperative and comfortable  HEENT:  Head: Normocephalic, no lesions, without obvious abnormality  Eye: Normal external eye, conjunctiva, lidsc cornea  Ears: Normal external ears  Nose: Normal external nose, mucus membranes  CARDIAC:  regular rate and rhythm, S1, S2 normal, no murmur, click, rub or gallop  LUNGS:  no abnormal respiratory pattern, no retractions noted, non-labored breathing   ABDOMEN:  soft, non-tender, non-distended  EXTREMITIES:  extremities normal, warm and well-perfused; no cyanosis, clubbing, or edema and erythema to right stump  tender to palpate thigh, above stump  NEURO:  clear speech, following all commands, oriented x4  PSYCH:  Alert and oriented, appropriate affect  Diagnostic Studies: Labs reviewed  XR femur 2 vw right   Final Result by Kath Mittal MD (06/16 0368)      No acute osseous abnormality  Cannot exclude early osteomyelitis  Follow as clinically indicated  The study was marked in [facility] for immediate notification  Workstation performed: [id number]             Laboratory: Labs reviewed  Results from last 7 days   Lab Units 06/18/21  0601   HEMOGLOBIN g/dL 12 3*   HEMATOCRIT % 37 4*   WBC Thousand/uL 6 00           Invalid input(s): CA         ** Please Note: Fluency Direct voice to text software may have been used in the creation of this document   **

## 2021-06-24 NOTE — DISCHARGE INSTRUCTIONS
Lower Limb Prosthesis   WHAT YOU NEED TO KNOW:   A lower limb prosthesis is a device made to replace all or part of your leg or foot  A lower limb prosthesis is made for your height, weight, and type or level of activity  All types include a foot and socket  The socket is the part of the prosthesis that connects to your stump (the end of your lower limb)  DISCHARGE INSTRUCTIONS:   Follow up with your healthcare provider or specialist as directed: You may need to return to have the fit of your prosthesis checked and adjusted  Never try to fix or adjust your prosthesis on your own  Write down your questions so you remember to ask them during your visits  Self-care:   · Check your stump daily:  Look for redness, blisters, soreness, or swelling  If you see any of these changes, stop using your prosthesis and contact your healthcare provider right away  Stop using the prosthesis and contact your healthcare provider if it causes pain  · Clean inside the socket daily:  You may use a damp soapy cloth to remove sweat and dirt, then a clean damp cloth to remove the soap  Dry the socket well with a dry cloth  · Wear your prosthesis as directed: You may need to wear your prosthesis all the time during the first few months after you get it  Contact your healthcare provider or specialist if:   · You have a fever  · You have problems using your prosthesis, such as moving, walking, or running  · You have trouble attaching or removing your prosthesis  · Your skin is itchy, swollen, or has a rash  · You have questions or concerns about your condition or care  Return to the emergency department if:   · You have pain or swelling around your stump, especially after using your prosthesis  · You have trouble breathing all of a sudden  · Your prosthesis gets damaged  · Your skin on the stump turns blue or white or it feels cold, numb, or tingly      © Copyright [employer] 2020 Information is for End User's use only and may not be sold, redistributed or otherwise used for commercial purposes  All illustrations and images included in CareNotes® are the copyrighted property of [employer]  or Carol Chu  The above information is an  only  It is not intended as medical advice for individual conditions or treatments  Talk to your doctor, nurse or pharmacist before following any medical regimen to see if it is safe and effective for you

## 2021-06-25 ENCOUNTER — TRANSITIONAL CARE MANAGEMENT (OUTPATIENT)
Dept: FAMILY MEDICINE CLINIC | Facility: CLINIC | Age: 61
End: 2021-06-25

## 2021-06-25 VITALS
OXYGEN SATURATION: 95 % | BODY MASS INDEX: 24.56 KG/M2 | TEMPERATURE: 97.1 F | WEIGHT: 162.04 LBS | HEIGHT: 68 IN | DIASTOLIC BLOOD PRESSURE: 70 MMHG | RESPIRATION RATE: 18 BRPM | HEART RATE: 95 BPM | SYSTOLIC BLOOD PRESSURE: 149 MMHG

## 2021-06-25 LAB
GLUCOSE SERPL-MCNC: 202 MG/DL (ref 65–140)
GLUCOSE SERPL-MCNC: 207 MG/DL (ref 65–140)

## 2021-06-25 PROCEDURE — 99238 HOSP IP/OBS DSCHRG MGMT 30/<: CPT | Performed by: FAMILY MEDICINE

## 2021-06-25 PROCEDURE — 97537 COMMUNITY/WORK REINTEGRATION: CPT

## 2021-06-25 PROCEDURE — 97530 THERAPEUTIC ACTIVITIES: CPT

## 2021-06-25 PROCEDURE — 97535 SELF CARE MNGMENT TRAINING: CPT

## 2021-06-25 PROCEDURE — 82948 REAGENT STRIP/BLOOD GLUCOSE: CPT

## 2021-06-25 RX ORDER — ISOSORBIDE MONONITRATE 120 MG/1
120 TABLET, EXTENDED RELEASE ORAL DAILY
Refills: 0
Start: 2021-06-25 | End: 2021-06-29 | Stop reason: ALTCHOICE

## 2021-06-25 RX ADMIN — INSULIN LISPRO 1 UNITS: 100 INJECTION, SOLUTION INTRAVENOUS; SUBCUTANEOUS at 08:14

## 2021-06-25 RX ADMIN — LISINOPRIL 10 MG: 10 TABLET ORAL at 08:16

## 2021-06-25 RX ADMIN — INSULIN LISPRO 1 UNITS: 100 INJECTION, SOLUTION INTRAVENOUS; SUBCUTANEOUS at 12:40

## 2021-06-25 RX ADMIN — METFORMIN HYDROCHLORIDE: 500 TABLET, FILM COATED ORAL at 08:15

## 2021-06-25 RX ADMIN — Medication 2000 UNITS: at 08:17

## 2021-06-25 RX ADMIN — ACETAMINOPHEN 975 MG: 325 TABLET ORAL at 05:41

## 2021-06-25 RX ADMIN — METHADONE HYDROCHLORIDE 70 MG: 10 TABLET ORAL at 08:17

## 2021-06-25 RX ADMIN — MAGNESIUM OXIDE TAB 400 MG (241.3 MG ELEMENTAL MG) 400 MG: 400 (241.3 MG) TAB at 08:17

## 2021-06-25 RX ADMIN — PREGABALIN 75 MG: 75 CAPSULE ORAL at 08:17

## 2021-06-25 RX ADMIN — FLUTICASONE PROPIONATE 1 SPRAY: 50 SPRAY, METERED NASAL at 08:20

## 2021-06-25 RX ADMIN — Medication 1 TABLET: at 08:16

## 2021-06-25 RX ADMIN — APIXABAN 5 MG: 5 TABLET, FILM COATED ORAL at 08:15

## 2021-06-25 RX ADMIN — DOCUSATE SODIUM 100 MG: 100 CAPSULE, LIQUID FILLED ORAL at 08:16

## 2021-06-25 RX ADMIN — PANTOPRAZOLE SODIUM 20 MG: 20 TABLET, DELAYED RELEASE ORAL at 08:16

## 2021-06-25 NOTE — OCCUPATIONAL THERAPY NOTE
OT DISCHARGE SUMMARY    Pt is being d/c from ARU to home later this pm  Goals have been met, with the exception of LB dressing requiring min A 2* decreased strength, endurance, and impaired balance  Pt participated in ADL training, functional mobility/transfers, prosthetic training, therpeutic exercise, therapeutic activity, and activity tolerance  Pt is returning home with WC, RW, drop arm commode, slide board, and shower bench  Pt has strong support system at home including nephew and other family memembers who help with care and safety  Recommending home therapy to continue to increase independence and safety       Florencio Rodriguez, OTS

## 2021-06-25 NOTE — PROGRESS NOTES
06/25/21 0750   Charting Type   Charting Type Shift assessment   Neurological   Neuro (WDL) X   Level of Consciousness Alert/awake   Orientation Level Oriented X4   Cognition Appropriate judgement; Follows commands   Facial Symmetry Symmetrical   Swallow Able to swallow solids and liquids without difficulty   RUE Muscle Strength 5- Normal strength   LUE Muscle Strength 5- Normal strength   RLE Muscle Strength Other (Comment)  (Rt  AKA )   LLE Muscle Strength 5- Normal strength   Neuro Symptoms None   Relieved by Rest   Neuro Additional Assessments No   Delirium Assessment- CAM    Acute Onset and Fluctuating Course (1) No   HEENT   HEENT (WDL) X   R Eye Mildly impaired vision   L Eye Mildly impaired vision   R Ear Intact   L Ear Intact   Teeth Missing teeth   Respiratory   Respiratory (WDL) X   Respiratory Pattern Normal   Chest Assessment Chest expansion symmetrical   Bilateral Breath Sounds Clear   Respiratory Additional Assessments No   Respiratory Interventions   Respiratory Interventions Cough and deep breathe   Cough and Deep Breathe   Cough and Deep Breathe Yes   Cardiac   Cardiac (WDL) WDL   Pacemaker/ICD No   Pain Assessment   Pain Assessment Tool Pain Assessment not indicated - pt denies pain   Pain Score 7   Pain Location/Orientation   (rt stump)   Patient's Stated Pain Goal No pain   RUE Neurovascular Assessment   R Radial Pulse +2   LUE Neurovascular Assessment   L Radial Pulse +2   RLE Neurovascular Assessment   R Femoral Pulse   (rt  AKA)   LLE Neurovascular Assessment   L Pedal Pulse +1   Integumentary   Integumentary (WDL) X   Skin Condition/Temp Warm;Dry   Skin Integrity Bruising   Skin Location scattered   Skin Turgor Non-tenting   Integumentary Additional Assessments No   Trey Scale   Sensory Perceptions 3   Moisture 4   Activity 3   Mobility 3   Nutrition 3   Friction and Shear 2   Trey Scale Score 18   Wound (LDAs)   Type of Wound (LDA) Wound   Wound 01/14/21 Leg Right   Date First Assessed/Time First Assessed: 01/14/21 1417   Location: Leg  Wound Location Orientation: Right  Wound Description (Comments): RIGHT LEG AKA WOUND NOTED, WOUND PACKED  Incision's 1st Dressing: KERLIX (x1), DRESSING SURGIPAD 5 X 9 IN STRL (x3  Wound Description Clean;Dry; Intact   Wound Site Closure Open to air   Treatments Site care   Dressing Status Clean;Dry; Intact   Musculoskeletal   Musculoskeletal (WDL) X   RLE Other (Comment)  (right AKA)   LLE Limited range of motion   Musculoskeletal Additional Assessments No   Gastrointestinal   Gastrointestinal (WDL) X   Abdomen Inspection Soft   Last BM Date 06/25/21   Gastrointestinal Additional Assessments No   Bowel Shift Assessment   Assistance Needed Stool softener   Bowel Incontinence No   Bowel Management Supervision/setup   Bowel Management Deficit Supervision/safety   Stool Assessment   Bowel Incontinence No   Stool Appearance Formed;Hard   Stool Color Brown   Stool Amount Large   Genitourinary   Genitourinary (WDL) WDL   Bladder Shift Assessment   Bladder Incontinence No   Bladder Management Supervision/setup   Urine Assessment   Urinary Incontinence No   Urine Color Yellow/straw   Urine Appearance Clear   Urine Odor No odor   Genitalia   Male Genitalia Intact   Anal/Rectal   Anal/Rectal (WDL) WDL   Psychosocial   Psychosocial (WDL) WDL   Needs Expressed Denies   Ability to Express Feelings Able to express   Ability to Express Needs Able to express   Ability to Express Thoughts Able to express   Ability to Understand Others Keyshawn Stone Suicide Severity Rating Scale   1  Wish to be Dead No   Cough   Cough None       States right stump pain relief with routinely scheduled methadone  Anxious for discharge this afternoon  Tolerating therapy  All meds reviewed with pt  And understood  Safety reinforced

## 2021-06-25 NOTE — NURSING NOTE
Patient resting comfortably in bed at this time  No signs of distress noted  Patient wore stump  as ordered overnight  Patient was encouraged to reposition self frequently to promote skin integrity  SCD as ordered to the left lower extremity  Call bell within reach  Will continue to monitor patient and follow plan of care

## 2021-06-25 NOTE — PROGRESS NOTES
06/25/21 0656   Pain Assessment   Pain Score 7   Pain Location/Orientation Orientation: Left; Location: Leg   Restrictions/Precautions   Precautions Fall Risk   RLE Weight Bearing Per Order NWB   Eating   Type of Assistance Needed Independent   Physical Assistance Level No physical assistance   Eating CARE Score 6   Oral Hygiene   Type of Assistance Needed Independent   Physical Assistance Level No physical assistance   Oral Hygiene CARE Score 6   Grooming   Able To Wash/Dry Face;Wash/Dry Hands;Comb/Brush Hair   Findings performed seated in    Shower/Bathe Self   Type of Assistance Needed Set-up / clean-up   Physical Assistance Level No physical assistance   Comment doffed prosthetic once seated in shower   Shower/Bathe Self CARE Score 5   Bathing   Assessed Bath Style Shower   Anticipated D/C Bath Style Shower   Able to Khoa Silvio Yes   Able to [employer] Temperature Yes   Able to Wash/Rinse/Dry (body part) Left Arm;Right Arm;L Upper Leg;R Upper Leg;L Lower Leg/Foot;R Lower Leg/Foot;Chest;Abdomen;Perineal Area; Buttocks   Positioning Seated   Adaptive Equipment Longhand Sponge; Shower [employer]; Shower Seat   Findings  Performed seated weight shifted for per hygiene   Tub/Shower Transfer   Limitations Noted In [address] with Back   Assessed Shower   Findings Supervision, RW with prosthetic donned   Upper Body Dressing   Type of Assistance Needed Set-up / clean-up   Physical Assistance Level No physical assistance   Upper Body Dressing CARE Score 5   Lower Body Dressing   Type of Assistance Needed Set-up / clean-up   Physical Assistance Level  --    Comment S/U,transfered to bed to weight shift to don shorts, occasional A to tighten screw on prosthetic   Lower Body Dressing CARE Score 5   Putting On/Taking Off Footwear   Type of Assistance Needed Set-up / clean-up   Physical Assistance Level No physical assistance   Comment Donned shoe with MAOF inside, PTt ed to marquita strategies to help with increased independence   Putting On/Taking Off Footwear CARE Score 5   Picking Up Object   Type of Assistance Needed Incidental touching   Physical Assistance Level 25% or less   Comment CG, Pt standing w prosthetic donned at RW, Pt used reacher to retrieve clothing from bottom of closet, with unsafe technique causing Pt to have LOB, Therapist provided support Pt recovered balance and then sat down for a rest break  Pt ed to safe retrival techniques of smooth movements and sitting when feeling weak for activity   Picking Up Object CARE Score 3   Dressing/Undressing Clothing   Able to  [employer]; Apply LE Orthosis; Apply LE Prosthesis; Remove LE Prosthesis; Remove LE Orthosis   Remove UB Clothes Pullover Shirt   Don UB Clothes Pullover Shirt   Remove LB Clothes Shorts;Socks;TEDs; Shoes   Don LB Clothes Shorts;Socks;TEDs; Shoes   Amount of Physical Assistance Provided No physical assistance   Limitations Noted In Balance   Adaptive Equipment Reacher   Positioning Supported Sit   Lying to Sitting on Side of Bed   Type of Assistance Needed Independent   Lying to Sitting on Side of Bed CARE Score 6   Sit to Stand   Type of Assistance Needed Supervision   Physical Assistance Level No physical assistance   Comment Prosthetic donned   Sit to Stand CARE Score 4   Bed-Chair Transfer   Type of Assistance Needed Independent   Physical Assistance Level No physical assistance   Comment performed without prosthetic   Chair/Bed-to-Chair Transfer CARE Score 6   Transfer Bed/Chair/Wheelchair   Adaptive Equipment Transfer Board   Toileting Hygiene   Type of Assistance Needed Independent   Physical Assistance Level No physical assistance   Comment Performed seated   Toileting Hygiene CARE Score 6   Toileting   Able to [address] down yes, up yes  Manage Hygiene Bladder; Bowel   Limitations Noted In Balance; Sequencing   Findings Prosthetic donned, managed clothing standing no VC   Toilet Transfer   Type of Assistance Needed Supervision   Physical Assistance Level No physical assistance   Comment Walked in with RW and Prosthetic donned VC to stand pivot transfer method   Toilet Transfer CARE Score 4   Toilet Transfer   Surface Assessed Drop Arm Commode   Transfer Technique Standard   Limitations Noted In [address] Walker   Findings VC to techniques for transfer with prosthetic donned   Cognition   Overall Cognitive Status WFL   Orientation Level Oriented X4   Additional Activities   Additional Activities Comments Supervision, Functional mobility within room with RW and prosthetic donned    Activity Tolerance   Activity Tolerance Patient tolerated treatment well   Assessment   Treatment Assessment Pt tx focused on assesment of consistency with ADL morning routine  Pt demonstrated good understanding of techniques transferring well with prosthetic donned and in to [facility] with sliding board  Pt shows good understanding of importance of skin integrity performing skin checks after doffing  and prosthetic  Pt demonstrated good dressing techniques both for donning and during toileting  Pt is experienced a LOB during clothing gathering recommending S level when performing functional mobility at home  Pt is being d/c to home to day 6/25  Prognosis Good   Problem List Decreased strength;Decreased endurance; Impaired balance   Plan   Treatment/Interventions ADL retraining;Functional transfer training;Equipment eval/education; Compensatory technique education;OT   Progress Progressing toward goals   Recommendation   Discharge Summary Pt is being discharged to home  Goals have been met, except LB dressing, Lizzy is current level  Pt is returning home with WC, RW, Drop arm Commode, Slide Board, and shower bench  Pt has strong support system at home of nephew and other family memembers who help with care and safety  Recommending home therapy to continue to increase independence and safety      OT Therapy Minutes OT Time In 0656   OT Time Out 0801   OT Total Time (minutes) 65   OT Mode of treatment - Individual (minutes) 65   OT Mode of treatment - Concurrent (minutes) 0   OT Mode of treatment - Group (minutes) 0   OT Mode of treatment - Co-treat (minutes) 0   OT Mode of Treatment - Total time(minutes) 65 minutes   OT Cumulative Minutes 682   Therapy Time missed   Time missed?  No

## 2021-06-25 NOTE — PROGRESS NOTES
06/25/21 1245   Pain Assessment   Pain Assessment Tool 0-10   Pain Score 4   Pain Location/Orientation Orientation: Right   Restrictions/Precautions   Precautions Fall Risk   Braces or Orthoses MAFO  (L LE)   Cognition   Overall Cognitive Status WFL   Arousal/Participation Alert; Responsive; Cooperative   Attention Within functional limits   Orientation Level Oriented X4   Memory Within functional limits   Following Commands Follows one step commands without difficulty   Transfer Bed/Chair/Wheelchair   Stand Pivot Supervision   Sit to Stand Supervision   Stand to Sit Supervision   Supine to Sit Independent   Sit to Supine Independent   Car Transfer   Type of Assistance Needed Supervision   Physical Assistance Level No physical assistance   Comment VC for stand pivot transfers and hand placement   Car Transfer CARE Score 4   Ambulation   Does the patient walk? 2  Yes   Curb or Single Stair   Style negotiated Single stair   Type of Assistance Needed Incidental touching   Physical Assistance Level 25% or less   Comment CG   1 Step (Curb) CARE Score 3   4 Steps   Type of Assistance Needed Incidental touching   Physical Assistance Level 25% or less   Comment CG   4 Steps CARE Score 3   Stairs   Type Stairs   # of Steps 7   Weight Bearing Precautions WBAT   Assist Devices Bilateral Rail   Findings Pt not ready to attempt single hand rail, still unsure of prosthetic for stairs  Therapeutic Interventions   Other stairs   Assessment   Treatment Assessment Pt attempted 7 stairs with bilateral HR and CG  Pt still to unsteady to use single HR, will need continued practice and strength training for graduation to single HR  PT Barriers   Physical Impairment Decreased strength;Decreased range of motion;Decreased endurance;Decreased mobility; Decreased safety awareness   Functional Limitation Ramp negotiation;Stair negotiation;Standing;Transfers; Walking   Plan   Treatment/Interventions Functional transfer training;LE strengthening/ROM; Elevations; Therapeutic exercise; Endurance training;Bed mobility;Gait training   Progress Progressing toward goals   PT Therapy Minutes   PT Time In 1245   PT Time Out 1300   PT Total Time (minutes) 15   PT Mode of treatment - Individual (minutes) 15   PT Mode of treatment - Concurrent (minutes) 0   PT Mode of treatment - Group (minutes) 0   PT Mode of treatment - Co-treat (minutes) 0   PT Mode of Treatment - Total time(minutes) 15 minutes   PT Cumulative Minutes 926   Therapy Time missed   Time missed?  No

## 2021-06-25 NOTE — NURSING NOTE
Discharged home in satisfactory condition  Team decided best transport home would be via car driven by belkys

## 2021-06-25 NOTE — PLAN OF CARE
Problem: Potential for Falls  Goal: Patient will remain free of falls  Description: INTERVENTIONS:  - Assess patient frequently for physical needs  -  Identify cognitive and physical deficits and behaviors that affect risk of falls  -  [place] fall precautions as indicated by assessment   - Educate patient/family on patient safety including physical limitations  - Instruct patient to call for assistance with activity based on assessment  - Modify environment to reduce risk of injury  - Consider OT/PT consult to assist with strengthening/mobility  6/25/2021 1242 by Heidy Tijerina RN  Outcome: Adequate for Discharge  6/25/2021 1143 by Heidy Tijerina RN  Outcome: Progressing     Problem: Potential for Falls  Goal: Patient will remain free of falls  Description: INTERVENTIONS:  - Assess patient frequently for physical needs  -  Identify cognitive and physical deficits and behaviors that affect risk of falls    -  [place] fall precautions as indicated by assessment   - Educate patient/family on patient safety including physical limitations  - Instruct patient to call for assistance with activity based on assessment  - Modify environment to reduce risk of injury  - Consider OT/PT consult to assist with strengthening/mobility  6/25/2021 1242 by Heidy Tijerina RN  Outcome: Adequate for Discharge  6/25/2021 1143 by Heidy Tijerina RN  Outcome: Progressing     Problem: Prexisting or High Potential for Compromised Skin Integrity  Goal: Skin integrity is maintained or improved  Description: INTERVENTIONS:  - Identify patients at risk for skin breakdown  - Assess and monitor skin integrity  - Assess and monitor nutrition and hydration status  - Monitor labs   - Assess for incontinence   - Turn and reposition patient  - Assist with mobility/ambulation  - Relieve pressure over bony prominences  - Avoid friction and shearing  - Provide appropriate hygiene as needed including keeping skin clean and dry  - Evaluate need for skin moisturizer/barrier cream  - Collaborate with interdisciplinary team   - Patient/family teaching  - Consider wound care consult   6/25/2021 1242 by Milana Lopez RN  Outcome: Adequate for Discharge  6/25/2021 1143 by Milana Lopez RN  Outcome: Progressing     Problem: PAIN - ADULT  Goal: Verbalizes/displays adequate comfort level or baseline comfort level  Description: Interventions:  - Encourage patient to monitor pain and request assistance  - Assess pain using appropriate pain scale  - Administer analgesics based on type and severity of pain and evaluate response  - Implement non-pharmacological measures as appropriate and evaluate response  - Consider cultural and social influences on pain and pain management  - Notify physician/advanced practitioner if interventions unsuccessful or patient reports new pain  6/25/2021 1242 by Milana Lopez RN  Outcome: Adequate for Discharge  6/25/2021 1143 by Milana Lopez RN  Outcome: Progressing     Problem: SAFETY ADULT  Goal: Patient will remain free of falls  Description: INTERVENTIONS:  - Assess patient frequently for physical needs  -  Identify cognitive and physical deficits and behaviors that affect risk of falls    -  [place] fall precautions as indicated by assessment   - Educate patient/family on patient safety including physical limitations  - Instruct patient to call for assistance with activity based on assessment  - Modify environment to reduce risk of injury  - Consider OT/PT consult to assist with strengthening/mobility  6/25/2021 1242 by Milana Lopez RN  Outcome: Adequate for Discharge  6/25/2021 1143 by Milana Lopez RN  Outcome: Progressing  Goal: Maintain or return to baseline ADL function  Description: INTERVENTIONS:  -  Assess patient's ability to carry out ADLs; assess patient's baseline for ADL function and identify physical deficits which impact ability to perform ADLs (bathing, care of mouth/teeth, toileting, grooming, dressing, etc )  - Assess/evaluate cause of self-care deficits   - Assess range of motion  - Assess patient's mobility; develop plan if impaired  - Assess patient's need for assistive devices and provide as appropriate  - Encourage maximum independence but intervene and supervise when necessary  - Involve family in performance of ADLs  - Assess for home care needs following discharge   - Consider OT consult to assist with ADL evaluation and planning for discharge  - Provide patient education as appropriate  6/25/2021 1242 by Nicolás Schulte RN  Outcome: Adequate for Discharge  6/25/2021 1143 by Nicolás Schulte RN  Outcome: Progressing  Goal: Maintain or return mobility status to optimal level  Description: INTERVENTIONS:  - Assess patient's baseline mobility status (ambulation, transfers, stairs, etc )    - Identify cognitive and physical deficits and behaviors that affect mobility  - Identify mobility aids required to assist with transfers and/or ambulation (gait belt, sit-to-stand, lift, walker, cane, etc )  - [place] fall precautions as indicated by assessment  - Record patient progress and toleration of activity level on Mobility SBAR; progress patient to next Phase/Stage  - Instruct patient to call for assistance with activity based on assessment  - Consider rehabilitation consult to assist with strengthening/weightbearing, etc   6/25/2021 1242 by Nicolás Schulte RN  Outcome: Adequate for Discharge  6/25/2021 1143 by Nicolás Schulte RN  Outcome: Progressing     Problem: DISCHARGE PLANNING  Goal: Discharge to home or other facility with appropriate resources  Description: INTERVENTIONS:  - Identify barriers to discharge w/patient and caregiver  - Arrange for needed discharge resources and transportation as appropriate  - Identify discharge learning needs (meds, wound care, etc )  - Arrange for interpretive services to assist at discharge as needed  - Refer to Case Management Department for coordinating discharge planning if the patient needs post-hospital services based on physician/advanced practitioner order or complex needs related to functional status, cognitive ability, or social support system  6/25/2021 1242 by Ekaterina Cobian RN  Outcome: Adequate for Discharge  6/25/2021 1143 by Ekaterina Cobian RN  Outcome: Progressing     Problem: SAFETY ADULT  Goal: Patient will remain free of falls  Description: INTERVENTIONS:  - Assess patient frequently for physical needs  -  Identify cognitive and physical deficits and behaviors that affect risk of falls    -  [place] fall precautions as indicated by assessment   - Educate patient/family on patient safety including physical limitations  - Instruct patient to call for assistance with activity based on assessment  - Modify environment to reduce risk of injury  - Consider OT/PT consult to assist with strengthening/mobility  6/25/2021 1242 by Ekaterina Cobian RN  Outcome: Adequate for Discharge  6/25/2021 1143 by Ekaterina Cobian RN  Outcome: Progressing  Goal: Maintain or return to baseline ADL function  Description: INTERVENTIONS:  -  Assess patient's ability to carry out ADLs; assess patient's baseline for ADL function and identify physical deficits which impact ability to perform ADLs (bathing, care of mouth/teeth, toileting, grooming, dressing, etc )  - Assess/evaluate cause of self-care deficits   - Assess range of motion  - Assess patient's mobility; develop plan if impaired  - Assess patient's need for assistive devices and provide as appropriate  - Encourage maximum independence but intervene and supervise when necessary  - Involve family in performance of ADLs  - Assess for home care needs following discharge   - Consider OT consult to assist with ADL evaluation and planning for discharge  - Provide patient education as appropriate  6/25/2021 1242 by Ekaterina Cobian RN  Outcome: Adequate for Discharge  6/25/2021 1143 by Ekaterina Cobian RN  Outcome: Progressing  Goal: Maintain or return mobility status to optimal level  Description: INTERVENTIONS:  - Assess patient's baseline mobility status (ambulation, transfers, stairs, etc )    - Identify cognitive and physical deficits and behaviors that affect mobility  - Identify mobility aids required to assist with transfers and/or ambulation (gait belt, sit-to-stand, lift, walker, cane, etc )  - [place] fall precautions as indicated by assessment  - Record patient progress and toleration of activity level on Mobility SBAR; progress patient to next Phase/Stage  - Instruct patient to call for assistance with activity based on assessment  - Consider rehabilitation consult to assist with strengthening/weightbearing, etc   6/25/2021 1242 by Dinora Segovia RN  Outcome: Adequate for Discharge  6/25/2021 1143 by Dinora Segovia RN  Outcome: Progressing     Problem: Knowledge Deficit  Goal: Patient/family/caregiver demonstrates understanding of disease process, treatment plan, medications, and discharge instructions  Description: Complete learning assessment and assess knowledge base    Interventions:  - Provide teaching at level of understanding  - Provide teaching via preferred learning methods  6/25/2021 1242 by Dinora Segovia RN  Outcome: Adequate for Discharge  6/25/2021 1143 by Dinoar Segovia RN  Outcome: Progressing     Problem: SKIN/TISSUE INTEGRITY - ADULT  Goal: Skin integrity remains intact  Description: INTERVENTIONS  - Identify patients at risk for skin breakdown  - Assess and monitor skin integrity  - Assess and monitor nutrition and hydration status  - Monitor labs (i e  albumin)  - Assess for incontinence   - Turn and reposition patient  - Assist with mobility/ambulation  - Relieve pressure over bony prominences  - Avoid friction and shearing  - Provide appropriate hygiene as needed including keeping skin clean and dry  - Evaluate need for skin moisturizer/barrier cream  - Collaborate with interdisciplinary team (i e  Nutrition, Rehabilitation, etc )   - Patient/family teaching  6/25/2021 1242 by Beatrice Freed RN  Outcome: Adequate for Discharge  6/25/2021 1143 by Beatrice Freed RN  Outcome: Progressing  Goal: Incision(s), wounds(s) or drain site(s) healing without S/S of infection  Description: INTERVENTIONS  - Assess and document risk factors for skin impairment   - Assess and document dressing, incision, wound bed, drain sites and surrounding tissue  - Consider nutrition services referral as needed  - Oral mucous membranes remain intact  - Provide patient/ family education  6/25/2021 1242 by Beatrice Freed RN  Outcome: Adequate for Discharge  6/25/2021 1143 by Beatrice Freed RN  Outcome: Progressing     Problem: MUSCULOSKELETAL - ADULT  Goal: Maintain or return mobility to safest level of function  Description: INTERVENTIONS:  - Assess patient's ability to carry out ADLs; assess patient's baseline for ADL function and identify physical deficits which impact ability to perform ADLs (bathing, care of mouth/teeth, toileting, grooming, dressing, etc )  - Assess/evaluate cause of self-care deficits   - Assess range of motion  - Assess patient's mobility  - Assess patient's need for assistive devices and provide as appropriate  - Encourage maximum independence but intervene and supervise when necessary  - Involve family in performance of ADLs  - Assess for home care needs following discharge   - Consider OT consult to assist with ADL evaluation and planning for discharge  - Provide patient education as appropriate  6/25/2021 1242 by Beatrice Freed RN  Outcome: Adequate for Discharge  6/25/2021 1143 by Beatrice Freed RN  Outcome: Progressing  Goal: Maintain proper alignment of affected body part  Description: INTERVENTIONS:  - Support, maintain and protect limb and body alignment  - Provide patient/ family with appropriate education  6/25/2021 1242 by Beatrice Freed RN  Outcome: Adequate for Discharge  6/25/2021 1143 by Beatrice Freed RN  Outcome: Progressing

## 2021-06-25 NOTE — PHYSICAL THERAPY NOTE
PT DISCHARGE SUMMARY:    Patient has met max benefit for inpatient [facility] PT  Patient  MOD I bed mobility, MOD I wheelchair mobility, S all transfers with walker and slide board, S ambulation up to 100' level surfaces with walker, CG/MIN A 7 steps with 2 handrails  Patient for d/c to home with family and continued PT services 06/25/2021  Patient safe to transport home via car

## 2021-06-25 NOTE — PLAN OF CARE
Problem: Potential for Falls  Goal: Patient will remain free of falls  Description: INTERVENTIONS:  - Assess patient frequently for physical needs  -  Identify cognitive and physical deficits and behaviors that affect risk of falls    -  [place] fall precautions as indicated by assessment   - Educate patient/family on patient safety including physical limitations  - Instruct patient to call for assistance with activity based on assessment  - Modify environment to reduce risk of injury  - Consider OT/PT consult to assist with strengthening/mobility  Outcome: Progressing     Problem: Prexisting or High Potential for Compromised Skin Integrity  Goal: Skin integrity is maintained or improved  Description: INTERVENTIONS:  - Identify patients at risk for skin breakdown  - Assess and monitor skin integrity  - Assess and monitor nutrition and hydration status  - Monitor labs   - Assess for incontinence   - Turn and reposition patient  - Assist with mobility/ambulation  - Relieve pressure over bony prominences  - Avoid friction and shearing  - Provide appropriate hygiene as needed including keeping skin clean and dry  - Evaluate need for skin moisturizer/barrier cream  - Collaborate with interdisciplinary team   - Patient/family teaching  - Consider wound care consult   Outcome: Progressing     Problem: PAIN - ADULT  Goal: Verbalizes/displays adequate comfort level or baseline comfort level  Description: Interventions:  - Encourage patient to monitor pain and request assistance  - Assess pain using appropriate pain scale  - Administer analgesics based on type and severity of pain and evaluate response  - Implement non-pharmacological measures as appropriate and evaluate response  - Consider cultural and social influences on pain and pain management  - Notify physician/advanced practitioner if interventions unsuccessful or patient reports new pain  Outcome: Progressing     Problem: SAFETY ADULT  Goal: Patient will remain free of falls  Description: INTERVENTIONS:  - Assess patient frequently for physical needs  -  Identify cognitive and physical deficits and behaviors that affect risk of falls    -  [place] fall precautions as indicated by assessment   - Educate patient/family on patient safety including physical limitations  - Instruct patient to call for assistance with activity based on assessment  - Modify environment to reduce risk of injury  - Consider OT/PT consult to assist with strengthening/mobility  Outcome: Progressing     Problem: SAFETY ADULT  Goal: Maintain or return to baseline ADL function  Description: INTERVENTIONS:  -  Assess patient's ability to carry out ADLs; assess patient's baseline for ADL function and identify physical deficits which impact ability to perform ADLs (bathing, care of mouth/teeth, toileting, grooming, dressing, etc )  - Assess/evaluate cause of self-care deficits   - Assess range of motion  - Assess patient's mobility; develop plan if impaired  - Assess patient's need for assistive devices and provide as appropriate  - Encourage maximum independence but intervene and supervise when necessary  - Involve family in performance of ADLs  - Assess for home care needs following discharge   - Consider OT consult to assist with ADL evaluation and planning for discharge  - Provide patient education as appropriate  Outcome: Progressing     Problem: SAFETY ADULT  Goal: Maintain or return mobility status to optimal level  Description: INTERVENTIONS:  - Assess patient's baseline mobility status (ambulation, transfers, stairs, etc )    - Identify cognitive and physical deficits and behaviors that affect mobility  - Identify mobility aids required to assist with transfers and/or ambulation (gait belt, sit-to-stand, lift, walker, cane, etc )  - [place] fall precautions as indicated by assessment  - Record patient progress and toleration of activity level on Mobility SBAR; progress patient to next Phase/Stage  - Instruct patient to call for assistance with activity based on assessment  - Consider rehabilitation consult to assist with strengthening/weightbearing, etc   Outcome: Progressing     Problem: PAIN - ADULT  Goal: Verbalizes/displays adequate comfort level or baseline comfort level  Description: Interventions:  - Encourage patient to monitor pain and request assistance  - Assess pain using appropriate pain scale  - Administer analgesics based on type and severity of pain and evaluate response  - Implement non-pharmacological measures as appropriate and evaluate response  - Consider cultural and social influences on pain and pain management  - Notify physician/advanced practitioner if interventions unsuccessful or patient reports new pain  Outcome: Progressing     Problem: SAFETY ADULT  Goal: Patient will remain free of falls  Description: INTERVENTIONS:  - Assess patient frequently for physical needs  -  Identify cognitive and physical deficits and behaviors that affect risk of falls    -  [place] fall precautions as indicated by assessment   - Educate patient/family on patient safety including physical limitations  - Instruct patient to call for assistance with activity based on assessment  - Modify environment to reduce risk of injury  - Consider OT/PT consult to assist with strengthening/mobility  Outcome: Progressing     Problem: SAFETY ADULT  Goal: Maintain or return to baseline ADL function  Description: INTERVENTIONS:  -  Assess patient's ability to carry out ADLs; assess patient's baseline for ADL function and identify physical deficits which impact ability to perform ADLs (bathing, care of mouth/teeth, toileting, grooming, dressing, etc )  - Assess/evaluate cause of self-care deficits   - Assess range of motion  - Assess patient's mobility; develop plan if impaired  - Assess patient's need for assistive devices and provide as appropriate  - Encourage maximum independence but intervene and supervise when necessary  - Involve family in performance of ADLs  - Assess for home care needs following discharge   - Consider OT consult to assist with ADL evaluation and planning for discharge  - Provide patient education as appropriate  Outcome: Progressing     Problem: SAFETY ADULT  Goal: Maintain or return mobility status to optimal level  Description: INTERVENTIONS:  - Assess patient's baseline mobility status (ambulation, transfers, stairs, etc )    - Identify cognitive and physical deficits and behaviors that affect mobility  - Identify mobility aids required to assist with transfers and/or ambulation (gait belt, sit-to-stand, lift, walker, cane, etc )  - [place] fall precautions as indicated by assessment  - Record patient progress and toleration of activity level on Mobility SBAR; progress patient to next Phase/Stage  - Instruct patient to call for assistance with activity based on assessment  - Consider rehabilitation consult to assist with strengthening/weightbearing, etc   Outcome: Progressing     Problem: DISCHARGE PLANNING  Goal: Discharge to home or other facility with appropriate resources  Description: INTERVENTIONS:  - Identify barriers to discharge w/patient and caregiver  - Arrange for needed discharge resources and transportation as appropriate  - Identify discharge learning needs (meds, wound care, etc )  - Arrange for interpretive services to assist at discharge as needed  - Refer to Case Management Department for coordinating discharge planning if the patient needs post-hospital services based on physician/advanced practitioner order or complex needs related to functional status, cognitive ability, or social support system  Outcome: Progressing

## 2021-06-25 NOTE — PROGRESS NOTES
06/25/21 0945   Pain Assessment   Pain Assessment Tool 0-10   Pain Score 7   Pain Location/Orientation Orientation: Right;Location: Leg   Restrictions/Precautions   Precautions Fall Risk   RLE Weight Bearing Per Order NWB   Therapeutic Excerise-Strength   UE Strength   (Pt educated to home exercise plan and demonstrated good unde)   Cognition   Overall Cognitive Status WFL   Orientation Level Oriented X4   Activity Tolerance   Activity Tolerance Patient tolerated treatment well   Assessment   Treatment Assessment Pt ed  to home exercise routine and stump/prosthetic care  Pt recieved handouts for carryover to home  Pt demonstrates good understanding able to report back strategies for how he will implement stump care into hime routine  Pt seen for 13 minute of concurrent therapy to work on similar goals with a pt with similar deficits  Pt is being discharged today 6/25   Prognosis Good   Problem List Decreased strength;Decreased endurance; Impaired balance   Plan   Treatment/Interventions OT; Compensatory technique education   Progress Progressing toward goals   OT Therapy Minutes   OT Time In 0948   OT Time Out 1001   OT Total Time (minutes) 13   OT Mode of treatment - Individual (minutes) 0   OT Mode of treatment - Concurrent (minutes) 13   OT Mode of treatment - Group (minutes) 0   OT Mode of treatment - Co-treat (minutes) 0   OT Mode of Treatment - Total time(minutes) 13 minutes   OT Cumulative Minutes 695   Therapy Time missed   Time missed? No   Column inserted at incorrect time   Column should be inserted at 9:48am

## 2021-06-29 ENCOUNTER — OFFICE VISIT (OUTPATIENT)
Dept: FAMILY MEDICINE CLINIC | Facility: CLINIC | Age: 61
End: 2021-06-29
Payer: COMMERCIAL

## 2021-06-29 VITALS
DIASTOLIC BLOOD PRESSURE: 64 MMHG | HEART RATE: 85 BPM | SYSTOLIC BLOOD PRESSURE: 122 MMHG | TEMPERATURE: 96.6 F | OXYGEN SATURATION: 95 % | HEIGHT: 68 IN | BODY MASS INDEX: 24.64 KG/M2

## 2021-06-29 DIAGNOSIS — S78.111A ABOVE-KNEE AMPUTATION OF RIGHT LOWER EXTREMITY (HCC): ICD-10-CM

## 2021-06-29 DIAGNOSIS — E11.42 TYPE 2 DIABETES MELLITUS WITH DIABETIC POLYNEUROPATHY, WITH LONG-TERM CURRENT USE OF INSULIN (HCC): ICD-10-CM

## 2021-06-29 DIAGNOSIS — Z76.89 ENCOUNTER FOR SUPPORT AND COORDINATION OF TRANSITION OF CARE: Primary | ICD-10-CM

## 2021-06-29 DIAGNOSIS — Z79.4 TYPE 2 DIABETES MELLITUS WITH DIABETIC POLYNEUROPATHY, WITH LONG-TERM CURRENT USE OF INSULIN (HCC): ICD-10-CM

## 2021-06-29 PROCEDURE — 1111F DSCHRG MED/CURRENT MED MERGE: CPT | Performed by: NURSE PRACTITIONER

## 2021-06-29 PROCEDURE — 99215 OFFICE O/P EST HI 40 MIN: CPT | Performed by: NURSE PRACTITIONER

## 2021-06-29 NOTE — PROGRESS NOTES
Assessment/Plan:    No problem-specific Assessment & Plan notes found for this encounter  Diagnoses and all orders for this visit:    Encounter for support and coordination of transition of care    Type 2 diabetes mellitus with diabetic polyneuropathy, with long-term current use of insulin ([address] )    Above-knee amputation of right lower extremity (HCC)          Subjective:      Patient ID: Festus Traylor is a 59 y o  male  Patient presents for follow up after stay in [facility]  Was in [facility] for around 12 days to help with ambulation with prosthetic and strength training  Worked with core strength and is now able to get himself from the bed to the chair and worked with the prosthetic leg and adjustments were made as it was causing pain and irritation  Patient states that his pain has significantly improved  Lyrica is improving with the phantom limb pain  Is going to schedule an appointment with pain management  Will have nursing and PT coming into the house 2 time a week and occupational medicine  Patient's jardiance now is not covered- will complete prior auth as sugars have been improving  The following portions of the patient's history were reviewed and updated as appropriate: allergies, current medications, past family history, past medical history, past social history, past surgical history and problem list     Review of Systems   Constitutional: Negative for activity change, appetite change, diaphoresis, fatigue, fever and unexpected weight change  HENT: Negative for congestion, mouth sores, rhinorrhea, sinus pain, trouble swallowing and voice change  Eyes: Negative for photophobia and visual disturbance  Respiratory: Negative for apnea, cough, chest tightness, shortness of breath and wheezing  Cardiovascular: Negative for chest pain, palpitations and leg swelling  Gastrointestinal: Negative for abdominal distention, abdominal pain, blood in stool, constipation, diarrhea, nausea and vomiting  Endocrine: Negative for cold intolerance, heat intolerance, polydipsia, polyphagia and polyuria  Genitourinary: Negative for decreased urine volume, difficulty urinating, frequency and urgency  Musculoskeletal: Positive for gait problem (wheelchair bound ) and myalgias  Negative for arthralgias, neck pain and neck stiffness  Skin: Negative for color change, rash and wound  Neurological: Negative for dizziness, weakness, light-headedness, numbness and headaches  Hematological: Negative for adenopathy  Does not bruise/bleed easily  Psychiatric/Behavioral: Negative for self-injury, sleep disturbance and suicidal ideas  The patient is not nervous/anxious  Objective:      /64 (BP Location: Left arm, Patient Position: Sitting)   Pulse 85   Temp (!) 96 6 °F (35 9 °C) (Tympanic)   Ht 5' 8" (1 727 m)   SpO2 95%   BMI 24 64 kg/m²          Physical Exam  Vitals and nursing note reviewed  Constitutional:       General: He is not in acute distress  Appearance: Normal appearance  He is not ill-appearing, toxic-appearing or diaphoretic  Cardiovascular:      Rate and Rhythm: Normal rate and regular rhythm  Pulses: Normal pulses  Heart sounds: Normal heart sounds  No murmur heard  No friction rub  No gallop  Pulmonary:      Effort: Pulmonary effort is normal  No respiratory distress  Breath sounds: Normal breath sounds  No stridor  No wheezing or rales  Abdominal:      General: Abdomen is flat  Bowel sounds are normal  There is no distension  Palpations: Abdomen is soft  There is no mass  Tenderness: There is no abdominal tenderness  There is no guarding or rebound  Hernia: No hernia is present  Musculoskeletal:         General: No swelling, tenderness or deformity  Normal range of motion  Cervical back: Normal range of motion and neck supple  No rigidity or tenderness        Comments: Right aka- stump healing well, no swelling, wounds, erythema or warmth    Lymphadenopathy:      Cervical: No cervical adenopathy  Skin:     General: Skin is warm and dry  Coloration: Skin is not jaundiced or pale  Findings: No bruising or lesion  Neurological:      General: No focal deficit present  Mental Status: He is alert and oriented to person, place, and time  Mental status is at baseline  Cranial Nerves: No cranial nerve deficit  Sensory: No sensory deficit  Motor: No weakness  Coordination: Coordination normal       Gait: Gait normal       Deep Tendon Reflexes: Reflexes normal            TCM Call (since 6/5/2021)     Date and time call was made  6/25/2021  3:09 PM    Hospital care reviewed  Records reviewed    Patient was hospitialized at  [address]        Date of Admission  06/10/21    Date of discharge  06/25/21    Diagnosis  S/P AKA    Disposition  Home      TCM Call (since 6/5/2021)     Scheduled for follow up?   Yes    I have advised the patient to call PCP with any new or worsening symptoms  hilda pablo

## 2021-07-07 ENCOUNTER — TELEPHONE (OUTPATIENT)
Dept: FAMILY MEDICINE CLINIC | Facility: CLINIC | Age: 61
End: 2021-07-07

## 2021-07-19 DIAGNOSIS — Z79.4 TYPE 2 DIABETES MELLITUS WITH DIABETIC POLYNEUROPATHY, WITH LONG-TERM CURRENT USE OF INSULIN (HCC): ICD-10-CM

## 2021-07-19 DIAGNOSIS — E11.42 TYPE 2 DIABETES MELLITUS WITH DIABETIC POLYNEUROPATHY, WITH LONG-TERM CURRENT USE OF INSULIN (HCC): ICD-10-CM

## 2021-07-19 RX ORDER — BLOOD-GLUCOSE METER
EACH MISCELLANEOUS
Qty: 1 KIT | Refills: 0 | Status: SHIPPED | OUTPATIENT
Start: 2021-07-19

## 2021-07-23 ENCOUNTER — OFFICE VISIT (OUTPATIENT)
Dept: PAIN MEDICINE | Facility: CLINIC | Age: 61
End: 2021-07-23
Payer: COMMERCIAL

## 2021-07-23 VITALS
SYSTOLIC BLOOD PRESSURE: 93 MMHG | DIASTOLIC BLOOD PRESSURE: 62 MMHG | HEIGHT: 68 IN | TEMPERATURE: 98.2 F | BODY MASS INDEX: 24.64 KG/M2

## 2021-07-23 DIAGNOSIS — G89.4 CHRONIC PAIN SYNDROME: Primary | ICD-10-CM

## 2021-07-23 DIAGNOSIS — S78.111A ABOVE-KNEE AMPUTATION OF RIGHT LOWER EXTREMITY (HCC): ICD-10-CM

## 2021-07-23 DIAGNOSIS — M54.12 CERVICAL RADICULOPATHY: ICD-10-CM

## 2021-07-23 DIAGNOSIS — E11.42 TYPE 2 DIABETES MELLITUS WITH DIABETIC POLYNEUROPATHY, UNSPECIFIED WHETHER LONG TERM INSULIN USE (HCC): ICD-10-CM

## 2021-07-23 PROCEDURE — 99214 OFFICE O/P EST MOD 30 MIN: CPT | Performed by: NURSE PRACTITIONER

## 2021-07-23 PROCEDURE — 1111F DSCHRG MED/CURRENT MED MERGE: CPT | Performed by: NURSE PRACTITIONER

## 2021-07-23 RX ORDER — PREGABALIN 75 MG/1
75 CAPSULE ORAL 3 TIMES DAILY
Qty: 90 CAPSULE | Refills: 2 | Status: SHIPPED | OUTPATIENT
Start: 2021-07-23 | End: 2021-08-20 | Stop reason: SDUPTHER

## 2021-07-23 RX ORDER — DULOXETIN HYDROCHLORIDE 60 MG/1
60 CAPSULE, DELAYED RELEASE ORAL
Qty: 30 CAPSULE | Refills: 2 | Status: SHIPPED | OUTPATIENT
Start: 2021-07-23 | End: 2021-08-20 | Stop reason: SDUPTHER

## 2021-07-23 NOTE — PROGRESS NOTES
Assessment:  1  Chronic pain syndrome    2  Cervical radiculopathy    3  Above-knee amputation of right lower extremity ([facility] )    4  Type 2 diabetes mellitus with diabetic polyneuropathy, unspecified whether long term insulin use ([email or web address] )        Plan:   While the patient was in the office today, I did have a thorough conversation regarding their chronic pain syndrome, medication management, and treatment plan options  Patient is being seen for a follow-up visit  He was last seen here on 05/19/2021 at which time Lyrica was increased to 75 mg 3 times daily and he was started on Cymbalta 30 mg at bedtime  Overall, he reports that he medication has reduced his pain by at least 50%  Pain is much less intense  Also, during his last visit I provided him with a home exercise program for cervical area  He states that he has been doing the exercises at home on a regular basis and although his neck pain has improved he is still experiencing numbness and weakness involving the right upper extremity  As such, we will order an MRI of his cervical spine to determine if there is any intraspinal pathology that would contribute to his current symptoms  Patient is aware that we will call him with results of the MRI when they are available for review  Increase Cymbalta to 60 mg at bedtime  Prescription was sent to his pharmacy  Continue Lyrica 75 mg 3 times daily  Prescription was sent electronically to his pharmacy  The patient will follow-up in 1 month for medication prescription refill and reevaluation  The patient was advised to contact the office should their symptoms worsen in the interim  The patient was agreeable and verbalized an understanding  History of Present Illness: The patient is a 59 y o  male who presents for a follow up office visit in regards to Arm Pain, Knee Pain, and Foot Pain     The patients current symptoms include Complaints numbness and weakness involving the right upper extremity, left foot pain,  Right stump pain  Current pain level is a 5/10  Current pain medications includes:  Lyrica 75 mg 3 times daily, Cymbalta 30 mg daily    The patient reports that this regimen is providing  50% pain relief  The patient is reporting no side effects from this pain medication regimen  I have personally reviewed and/or updated the patient's past medical history, past surgical history, family history, social history, current medications, allergies, and vital signs today  Review of Systems  Review of Systems   Constitutional: Negative for fatigue  HENT: Negative for ear pain and hearing loss  Eyes: Negative for redness  Respiratory: Negative for cough  Cardiovascular: Negative for leg swelling  Gastrointestinal: Negative for anal bleeding and rectal pain  Endocrine: Negative for polydipsia  Genitourinary: Negative for hematuria  Musculoskeletal: Positive for gait problem and myalgias  Negative for joint swelling  Skin: Negative for rash  Neurological: Positive for weakness  Negative for headaches  Hematological: Does not bruise/bleed easily  Psychiatric/Behavioral: Negative for behavioral problems and hallucinations  Past Medical History:   Diagnosis Date    Diabetes mellitus ([facility] )     GERD (gastroesophageal reflux disease)     Hypercholesteremia     Hypertension     Ischemia of right lower extremity with suspected rhabdomyolysis 2/6/2021    Left Hydropneumothorax 2/10/2021    Methadone use ([facility] )     Pneumonia due to COVID-19 virus 1/11/2021       Past Surgical History:   Procedure Laterality Date    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    Procedure: AMPUTATION ABOVE KNEE (AKA);   Surgeon: Claus Delacruz MD;  [facility]: [facility];  Service: Vascular    AMPUTATION ABOVE KNEE (AKA) Right 1/18/2021    Procedure: AMPUTATION ABOVE KNEE (AKA) FORMALIZATION,  R AKA wound washout, wound closure;  Surgeon: Claus Delacruz MD;  [facility]: [facility]; Service: Vascular    ARTERIOGRAM Right 1/13/2021    Procedure: ARTERIOGRAM;  Surgeon: DO Ally;  [facility]: [facility];  Service: Vascular    IR CHEST TUBE PLACEMENT  2/10/2021    IR LOWER EXTREMITY ANGIOGRAM  1/14/2021    THROMBECTOMY W/ EMBOLECTOMY Right 1/13/2021    Procedure: EMBOLECTOMY/THROMBECTOMY LOWER EXTREMITY;  Surgeon: DO Ally;  [facility]: [facility];  Service: Vascular       Family History   Problem Relation Age of Onset    Diabetes Mother     Hypertension Father     Leukemia Father     Acute lymphoblastic leukemia Father     Cancer Sister        Social History     Occupational History    Not on file   Tobacco Use    Smoking status: Former Smoker    Smokeless tobacco: Never Used   Vaping Use    Vaping Use: Never used   Substance and Sexual Activity    Alcohol use: Not Currently    Drug use: No     Comment: methadone    Sexual activity: Not on file         Current Outpatient Medications:     acetaminophen (TYLENOL) 325 mg tablet, Take 3 tablets (975 mg total) by mouth every 8 (eight) hours (Patient taking differently: Take 650 mg by mouth 3 (three) times a day as needed for moderate pain ), Disp: , Rfl: 0    apixaban (ELIQUIS) 5 mg, Take 1 tablet (5 mg total) by mouth 2 (two) times a day, Disp: 60 tablet, Rfl: 0    cetirizine (ZyrTEC) 10 mg tablet, Take 10 mg by mouth daily, Disp: , Rfl:     cholecalciferol (VITAMIN D3) 1,000 units tablet, Take 2 tablets (2,000 Units total) by mouth daily, Disp: 12 tablet, Rfl: 0    Diclofenac Sodium (VOLTAREN) 1 %, Apply 4 g topically 4 (four) times a day (Patient taking differently: Apply 4 g topically 4 (four) times a day as needed ), Disp: , Rfl: 0    DULoxetine (CYMBALTA) 60 mg delayed release capsule, Take 1 capsule (60 mg total) by mouth daily at bedtime, Disp: 30 capsule, Rfl: 2    fluticasone (FLONASE) 50 mcg/act nasal spray, 1 spray into each nostril daily, Disp: , Rfl:     insulin glargine (LANTUS) 100 units/mL subcutaneous injection, Inject 17 Units under the skin daily at bedtime, Disp: 5 mL, Rfl: 0    lansoprazole (PREVACID) 30 mg capsule, Take 1 capsule (30 mg total) by mouth daily, Disp: 30 capsule, Rfl: 0    lidocaine (LMX) 4 % cream, Apply topically 4 (four) times a day as needed for mild pain, Disp: 30 g, Rfl: 0    lisinopril (ZESTRIL) 10 mg tablet, Take 1 tablet (10 mg total) by mouth daily, Disp: 30 tablet, Rfl: 0    magnesium oxide (MAG-OX) 400 mg, Take 1 tablet (400 mg total) by mouth 2 (two) times a day, Disp: 60 tablet, Rfl: 0    multivitamin-minerals (CENTRUM ADULTS) tablet, Take 1 tablet by mouth daily, Disp: , Rfl: 0    pregabalin (LYRICA) 75 mg capsule, Take 1 capsule (75 mg total) by mouth 3 (three) times a day, Disp: 90 capsule, Rfl: 2    sitaGLIPtin-metFORMIN (JANUMET)  MG per tablet, Take 1 tablet by mouth 2 (two) times a day with meals, Disp: 60 tablet, Rfl: 0    albuterol (PROVENTIL HFA,VENTOLIN HFA) 90 mcg/act inhaler, Inhale 2 puffs every 4 (four) hours as needed for wheezing (Patient not taking: Reported on 6/29/2021), Disp: , Rfl: 0    Blood Glucose Monitoring Suppl (OneTouch Verio) w/Device KIT, Use to test blood sugars 3 times daily, Disp: 1 kit, Rfl: 0    Empagliflozin (Jardiance) 25 MG TABS, Take 1 tablet (25 mg total) by mouth every morning (Patient not taking: Reported on 6/29/2021), Disp: 30 tablet, Rfl: 0    Insulin Pen Needle 31G X 5 MM MISC, 30 units sq 2X daily, Disp: 100 each, Rfl: 3    Insulin Pen Needle 31G X 5 MM MISC, Use daily Pt to inject 4 X daily, Disp: 100 each, Rfl: 5    menthol-methyl salicylate (BENGAY) 49-09 % cream, Apply topically 4 (four) times a day as needed (torticollis) (Patient not taking: Reported on 6/10/2021), Disp: , Rfl: 0    naloxone (NARCAN) 4 mg/0 1 mL nasal spray, Administer 1 spray into a nostril  If no response after 2-3 minutes, give another dose in the other nostril using a new spray   (Patient not taking: Reported on 5/11/2021), Disp: 1 each, Rfl: 1    Allergies   Allergen Reactions    Varenicline        Physical Exam:    BP 93/62 (BP Location: Right arm, Patient Position: Sitting, Cuff Size: Standard)   Temp 98 2 °F (36 8 °C)   Ht 5' 8" (1 727 m)   BMI 24 64 kg/m²     Constitutional:normal, well developed, well nourished, alert, in no distress and non-toxic and no overt pain behavior    Eyes:anicteric  HEENT:grossly intact  Neck:supple, symmetric, trachea midline and no masses   Pulmonary:even and unlabored  Cardiovascular:No edema or pitting edema present  Skin:Normal without rashes or lesions and well hydrated  Psychiatric:Mood and affect appropriate  Neurologic:Cranial Nerves II-XII grossly intact  Musculoskeletal:in wheelchair    Imaging  MRI cervical spine without contrast    (Results Pending)       Orders Placed This Encounter   Procedures    MRI cervical spine without contrast

## 2021-07-27 ENCOUNTER — TELEPHONE (OUTPATIENT)
Dept: FAMILY MEDICINE CLINIC | Facility: CLINIC | Age: 61
End: 2021-07-27

## 2021-07-28 NOTE — PLAN OF CARE
"Robe Bryant is a 69 y.o. male.    Chief Complaint   Patient presents with   • Diabetes     1 month f/u    • Hypertension   • COPD   • Hyperlipidemia   • Pain     hip, knee, and foot pain on the left side, no better since last visit. c/o left foot \"turning inward\"       HPI   Patient has had diabetes for the past few years. He has been compliant with the medications and denies any side effects from it. He has been monitoring fingersticks.  his fingerstick range is between 90's.  He has not had hypoglycemic symptoms. He has been following a diabetic diet.      Patient has hypertension.  They are taking lisinopril (Prinivil), Metoprolol, amlodipine and furosemide, aldactone and terazosin.  They have been compliant with medications.  The patient denies any side effects to the medication.  Blood pressure is controlled in the office today.  Blood pressure has been running unknown at [facility].  They are following a low salt diet.  He does have increased swelling currently.    Patient has COPD . He is not on oxygen. He admits to to some dyspnea with exertion.  He admits to on and off cough, SOB and wheezing that does respond to treatment. He uses the albuterol at least 3 times a day. He is on no maintenance inhalers right now without good response.  Unable to get stiolto.      Patient has had hyperlipidemia for few years. He has been compliant with low fat diet. He has been compliant with taking the medications, without side effects. his weight is stable compared to last visit.    Patient continues to complain of pain, particularly from back down to legs.  He has muscle spasms in legs and sometimes hands.  He has called Dr. Deluca's office and has not heard back yet with an appointment.      The following portions of the patient's history were reviewed and updated as appropriate: allergies, current medications, past family history, past medical history, past social history, past surgical history and problem list.     No " Problem: Nutrition/Hydration-ADULT  Goal: Nutrient/Hydration intake appropriate for improving, restoring or maintaining nutritional needs  Description: Monitor and assess patient's nutrition/hydration status for malnutrition  Collaborate with interdisciplinary team and initiate plan and interventions as ordered  Monitor patient's weight and dietary intake as ordered or per policy  Utilize nutrition screening tool and intervene as necessary  Determine patient's food preferences and provide high-protein, high-caloric foods as appropriate       INTERVENTIONS:  - Monitor oral intake, urinary output, labs, and treatment plans  - Assess nutrition and hydration status and recommend course of action  - Evaluate amount of meals eaten  - Assist patient with eating if necessary   - Allow adequate time for meals  - Recommend/ encourage appropriate diets, oral nutritional supplements, and vitamin/mineral supplements  - Order, calculate, and assess calorie counts as needed  - Recommend, monitor, and adjust tube feedings and TPN/PPN based on assessed needs  - Assess need for intravenous fluids  - Provide specific nutrition/hydration education as appropriate  - Include patient/family/caregiver in decisions related to nutrition  Outcome: Progressing Known Allergies      Current Outpatient Medications:   •  amiodarone (PACERONE) 200 MG tablet, Take 1 tablet by mouth Daily., Disp: 30 tablet, Rfl: 0  •  amLODIPine (NORVASC) 10 MG tablet, Take 1 tablet by mouth Daily., Disp: 30 tablet, Rfl: 1  •  aspirin 81 MG chewable tablet, Chew 81 mg Daily., Disp: , Rfl:   •  atorvastatin (LIPITOR) 20 MG tablet, Take 1 tablet by mouth Every Night., Disp: 90 tablet, Rfl: 3  •  baclofen (LIORESAL) 10 MG tablet, Take 1 tablet by mouth 3 (Three) Times a Day As Needed for Muscle Spasms., Disp: 90 tablet, Rfl: 2  •  Blood Glucose Monitoring Suppl (TRUE METRIX AIR GLUCOSE METER) w/Device kit, 1 each by In Vitro route 2 (two) times a day. E11.9, Disp: 1 kit, Rfl: 0  •  cetirizine (zyrTEC) 10 MG tablet, Take 1 tablet by mouth Daily., Disp: 90 tablet, Rfl: 3  •  Cholecalciferol (Vitamin D-3) 25 MCG (1000 UT) capsule, Take 1,000 Units by mouth Daily., Disp: 90 capsule, Rfl: 3  •  diclofenac (VOLTAREN) 1 % gel gel, Apply 4 g topically to the appropriate area as directed 4 (Four) Times a Day As Needed (pain)., Disp: 100 g, Rfl: 5  •  finasteride (PROSCAR) 5 MG tablet, Take 1 tablet by mouth Daily., Disp: 90 tablet, Rfl: 3  •  FLUoxetine (PROzac) 20 MG capsule, Take 1 capsule by mouth Daily., Disp: 90 capsule, Rfl: 3  •  fluticasone (Flonase) 50 MCG/ACT nasal spray, 2 sprays into the nostril(s) as directed by provider Daily., Disp: 18.2 mL, Rfl: 5  •  furosemide (Lasix) 20 MG tablet, Take 1 tablet by mouth every morning; may take second dose as needed for increased swelling, Disp: 60 tablet, Rfl: 2  •  glipizide (GLUCOTROL) 5 MG tablet, TAKE 1 TABLET BY MOUTH 2 TIMES A DAY BEFORE MEALS., Disp: 180 tablet, Rfl: 3  •  glucose blood (True Metrix Blood Glucose Test) test strip, Daily testing E11.9, Disp: 100 each, Rfl: 5  •  glucose monitor monitoring kit, 1 each Daily. E11.9, Disp: 1 each, Rfl: 0  •  ipratropium-albuterol (DUO-NEB) 0.5-2.5 mg/3 ml nebulizer, Take 3 mL by nebulization 4 (Four)  Times a Day., Disp: 1620 mL, Rfl: 2  •  Lancet Device misc, 1 each Daily. E11.9, Disp: 100 each, Rfl: 3  •  lisinopril (PRINIVIL,ZESTRIL) 40 MG tablet, Take 1 tablet by mouth Daily., Disp: 90 tablet, Rfl: 3  •  metoprolol succinate XL (Toprol XL) 50 MG 24 hr tablet, Take 1 tablet by mouth Daily., Disp: 30 tablet, Rfl: 0  •  Misc. Devices misc, Bipap tubing., Disp: 1 each, Rfl: 0  •  Multiple Vitamin tablet, Take 1 tablet by mouth daily., Disp: , Rfl:   •  Nebulizer misc, 1 each Every 4 (Four) Hours As Needed (shortness of breath)., Disp: 1 each, Rfl: 0  •  nitroglycerin (NITROSTAT) 0.4 MG SL tablet, Place 1 tablet under the tongue Every 5 (Five) Minutes As Needed for Chest Pain., Disp: 25 tablet, Rfl: 11  •  O2 (OXYGEN), Inhale 2 L/min Continuous As Needed (With activity)., Disp: , Rfl:   •  Olopatadine HCl 0.7 % solution, Apply 1 drop to eye(s) as directed by provider Daily., Disp: 2.5 mL, Rfl: 5  •  omeprazole (priLOSEC) 40 MG capsule, Take 1 capsule by mouth Daily., Disp: 90 capsule, Rfl: 3  •  ondansetron ODT (Zofran ODT) 4 MG disintegrating tablet, Place 1 tablet on the tongue Every 8 (Eight) Hours As Needed for Nausea or Vomiting., Disp: 20 tablet, Rfl: 0  •  potassium chloride (K-DUR,KLOR-CON) 10 MEQ CR tablet, Take 1 tablet by mouth 2 (Two) Times a Day., Disp: 60 tablet, Rfl: 2  •  pramipexole (MIRAPEX) 0.5 MG tablet, Take 1 tablet by mouth 3 (Three) Times a Day., Disp: 270 tablet, Rfl: 3  •  spironolactone (Aldactone) 50 MG tablet, Take 1 tablet by mouth Daily., Disp: 90 tablet, Rfl: 1  •  terazosin (HYTRIN) 10 MG capsule, Take 1 capsule by mouth Every Night., Disp: 90 capsule, Rfl: 3  •  tiotropium bromide-olodaterol (Stiolto Respimat) 2.5-2.5 MCG/ACT aerosol solution inhaler, Inhale 2 puffs Daily. Until able to get trelegy approved., Disp: 4 g, Rfl: 5  •  TRUEplus Lancets 33G misc, 1 each by Other route Daily. E11.9, Disp: 100 each, Rfl: 5  •  Budeson-Glycopyrrol-Formoterol ([employer]el[employer]zi Aerosphere) 160-9-4.8  "MCG/ACT aerosol inhaler, Inhale 2 puffs 2 (Two) Times a Day., Disp: 15.9 g, Rfl: 0  •  HYDROcodone-acetaminophen (Norco)  MG per tablet, Take 1 tablet by mouth Every 6 (Six) Hours As Needed for Severe Pain ., Disp: 12 tablet, Rfl: 0    ROS     Review of Systems   Constitutional: Positive for fatigue. Negative for chills and fever.   Respiratory: Positive for cough and shortness of breath.    Cardiovascular: Negative for chest pain.   Gastrointestinal: Positive for diarrhea and vomiting. Negative for abdominal pain, constipation and nausea.   Musculoskeletal: Positive for arthralgias and myalgias.   Neurological: Positive for weakness.       Vitals:    07/28/21 1043   BP: 122/80   BP Location: Left arm   Patient Position: Sitting   Cuff Size: Adult   Pulse: 60   Resp: 16   Temp: 98.6 °F (37 °C)   TempSrc: Temporal   SpO2: 96%   Weight: 111 kg (245 lb 12.8 oz)   Height: 162.6 cm (64.02\")     Body mass index is 42.17 kg/m².    Physical Exam     Physical Exam  Constitutional:       General: He is not in acute distress.     Appearance: He is well-developed.      Comments: Facial edema   HENT:      Head: Normocephalic and atraumatic.      Right Ear: External ear normal.      Left Ear: External ear normal.   Eyes:      Extraocular Movements: Extraocular movements intact.      Conjunctiva/sclera: Conjunctivae normal.   Cardiovascular:      Rate and Rhythm: Normal rate and regular rhythm.   Pulmonary:      Effort: Pulmonary effort is normal. No respiratory distress.      Breath sounds: Normal breath sounds. No wheezing.   Abdominal:      General: Bowel sounds are normal. There is no distension.      Palpations: Abdomen is soft.      Tenderness: There is no abdominal tenderness.   Musculoskeletal:      Right lower leg: Edema present.      Left lower leg: Edema present.   Skin:     General: Skin is warm and dry.   Neurological:      Mental Status: He is alert and oriented to person, place, and time.      Cranial Nerves: " No cranial nerve deficit.   Psychiatric:         Mood and Affect: Mood normal.         Behavior: Behavior normal.         Assessment/Plan    Problems Addressed this Visit        Cardiac and Vasculature    Essential hypertension     Controlled on current medication. Patient will continue lisinopril, amlodipine, metoprolol, spironolactone, Terazosin, and Lasix. Will obtain labs.  He does have increased swelling. May need to adjust diuretics.         Relevant Orders    CBC & Differential    Comprehensive Metabolic Panel    Hypercholesterolemia     Stable on current medication.  Continue lipitor.          Relevant Orders    Lipid Panel       Endocrine and Metabolic    Type 2 diabetes mellitus, without long-term current use of insulin (CMS/[facility]) - Primary     Controlled on current medication.  Patient is to continue glipizide.  Will continue to monitor A1c and microalbumin every few months.  Patient is on an ACE inhibitor, statin, and baby aspirin.           Relevant Orders    CBC & Differential    Comprehensive Metabolic Panel    Hemoglobin A1c    Microalbumin / Creatinine Urine Ratio - Urine, Clean Catch       Musculoskeletal and Injuries    Chronic low back pain     Patient to schedule with pain management. He was referred several months ago.                New Medications Ordered This Visit   Medications   • Budeson-Glycopyrrol-Formoterol (Breztri Aerosphere) 160-9-4.8 MCG/ACT aerosol inhaler     Sig: Inhale 2 puffs 2 (Two) Times a Day.     Dispense:  15.9 g     Refill:  0     Unable to get stiolto.       No orders of the defined types were placed in this encounter.      Return in about 3 months (around 10/28/2021) for diabetes.    Radha Jean DO

## 2021-08-02 DIAGNOSIS — E11.42 TYPE 2 DIABETES MELLITUS WITH DIABETIC POLYNEUROPATHY, WITH LONG-TERM CURRENT USE OF INSULIN (HCC): ICD-10-CM

## 2021-08-02 DIAGNOSIS — Z79.4 TYPE 2 DIABETES MELLITUS WITH DIABETIC POLYNEUROPATHY, WITH LONG-TERM CURRENT USE OF INSULIN (HCC): ICD-10-CM

## 2021-08-02 RX ORDER — EMPAGLIFLOZIN 25 MG/1
25 TABLET, FILM COATED ORAL EVERY MORNING
Qty: 90 TABLET | Refills: 3 | Status: SHIPPED | OUTPATIENT
Start: 2021-08-02 | End: 2022-08-08

## 2021-08-05 ENCOUNTER — HOSPITAL ENCOUNTER (OUTPATIENT)
Dept: MRI IMAGING | Facility: HOSPITAL | Age: 61
Discharge: HOME | End: 2021-08-05
Payer: COMMERCIAL

## 2021-08-05 DIAGNOSIS — M54.12 CERVICAL RADICULOPATHY: ICD-10-CM

## 2021-08-05 PROCEDURE — G1004 CDSM NDSC: HCPCS

## 2021-08-05 PROCEDURE — 72141 MRI NECK SPINE W/O DYE: CPT

## 2021-08-10 NOTE — TELEPHONE ENCOUNTER
I never took the eliquis out of the system or off his medication list  I did send a refil for him  I dont know what a new patient order means  Please clarify   Thanks

## 2021-08-10 NOTE — TELEPHONE ENCOUNTER
Patient's nephew Ruben Benedict called in and requested a new Pt order  He stated Jalen's home Health nurse could not do that  He also stated [facility] told him the Eliquis was taking out of the system by Valerie Zaragoza, He needs to have this refilled asap  Please advise and I will contact Ruben Benedict once resolved

## 2021-08-11 ENCOUNTER — TELEPHONE (OUTPATIENT)
Dept: PAIN MEDICINE | Facility: MEDICAL CENTER | Age: 61
End: 2021-08-11

## 2021-08-11 NOTE — TELEPHONE ENCOUNTER
Doc Lock called back in regards to eliquis  States the pharmacy told him the order was cancelled  I called and spoke with Zachary Choudhary at [facility]  She states that they do have the script and it is ready for patient to   Called and informed Doc Lock of this  He verbalized an understanding and appreciation  All questions answered at this time

## 2021-08-11 NOTE — TELEPHONE ENCOUNTER
S/w pt's nephew Ruben Riding (ok per RAJI) and advised of same  Nephew verbalized understanding and appreciation

## 2021-08-11 NOTE — TELEPHONE ENCOUNTER
----- Message from Katelynn Luevano, [address] sent at 8/11/2021  9:13 AM EDT -----   Please call patient and let him know that the MRI of his cervical spine reveals multilevel degenerative changes with at least moderate stenosis on the right at C3-4 and C4-5  This could definitely contribute to his current symptoms of right upper extremity weakness and numbness  Please advise him to keep scheduled follow-up visit with me

## 2021-08-20 ENCOUNTER — OFFICE VISIT (OUTPATIENT)
Dept: PAIN MEDICINE | Facility: CLINIC | Age: 61
End: 2021-08-20
Payer: COMMERCIAL

## 2021-08-20 VITALS
BODY MASS INDEX: 24.64 KG/M2 | HEART RATE: 84 BPM | HEIGHT: 68 IN | TEMPERATURE: 98.4 F | DIASTOLIC BLOOD PRESSURE: 68 MMHG | SYSTOLIC BLOOD PRESSURE: 109 MMHG

## 2021-08-20 DIAGNOSIS — E11.42 TYPE 2 DIABETES MELLITUS WITH DIABETIC POLYNEUROPATHY, UNSPECIFIED WHETHER LONG TERM INSULIN USE (HCC): ICD-10-CM

## 2021-08-20 DIAGNOSIS — G89.29 CHRONIC BILATERAL LOW BACK PAIN WITHOUT SCIATICA: ICD-10-CM

## 2021-08-20 DIAGNOSIS — S78.111A ABOVE-KNEE AMPUTATION OF RIGHT LOWER EXTREMITY (HCC): ICD-10-CM

## 2021-08-20 DIAGNOSIS — M54.12 CERVICAL RADICULOPATHY: ICD-10-CM

## 2021-08-20 DIAGNOSIS — M54.50 CHRONIC BILATERAL LOW BACK PAIN WITHOUT SCIATICA: ICD-10-CM

## 2021-08-20 DIAGNOSIS — G89.4 CHRONIC PAIN SYNDROME: Primary | ICD-10-CM

## 2021-08-20 PROCEDURE — 3078F DIAST BP <80 MM HG: CPT | Performed by: NURSE PRACTITIONER

## 2021-08-20 PROCEDURE — 3074F SYST BP LT 130 MM HG: CPT | Performed by: NURSE PRACTITIONER

## 2021-08-20 PROCEDURE — 99214 OFFICE O/P EST MOD 30 MIN: CPT | Performed by: NURSE PRACTITIONER

## 2021-08-20 RX ORDER — PREGABALIN 100 MG/1
100 CAPSULE ORAL 3 TIMES DAILY
Qty: 90 CAPSULE | Refills: 2 | Status: SHIPPED | OUTPATIENT
Start: 2021-08-20 | End: 2021-11-29

## 2021-08-20 RX ORDER — DULOXETIN HYDROCHLORIDE 30 MG/1
30 CAPSULE, DELAYED RELEASE ORAL
Qty: 30 CAPSULE | Refills: 1 | Status: SHIPPED | OUTPATIENT
Start: 2021-08-20 | End: 2021-12-27

## 2021-08-20 NOTE — PROGRESS NOTES
Assessment:  1  Chronic pain syndrome    2  Chronic bilateral low back pain without sciatica    3  Cervical radiculopathy    4  Above-knee amputation of right lower extremity ([facility] [address] )    5  Type 2 diabetes mellitus with diabetic polyneuropathy, unspecified whether long term insulin use ([facility] )        Plan:  While the patient was in the office today, I did have a thorough conversation regarding their chronic pain syndrome, medication management, and treatment plan options  Patient is being seen for follow-up visit  He was last seen here on 07/23/2021 at which time an MRI of his cervical spine was ordered  MRI reveals multilevel degenerative changes with at least moderate stenosis of the foramina on the right C3-4 and C4-5 levels  MRI results were reviewed with patient during today's visit  We discussed possibility of cervical epidural steroid injection  I provided him with a brochure to take home and read over  During his last visit, Cymbalta was increased to 60 mg at bedtime  He states that the Cymbalta made him drowsy so he resumed 30 mg at bedtime  We will continue with Cymbalta  A prescription was sent electronically to his pharmacy  Will increase Lyrica to 100 mg 3 times daily  A prescription was sent electronically to his pharmacy  The patient will follow-up in 1 month for medication prescription refill and reevaluation  The patient was advised to contact the office should their symptoms worsen in the interim  The patient was agreeable and verbalized an understanding  History of Present Illness: The patient is a 59 y o  male who presents for a follow up office visit in regards to Arm Pain, Elbow Pain, Knee Pain, Leg Pain, and Foot Pain  The patients current symptoms include Complaints neck and right upper extremity pain, right stump pain, left lower extremity pain  Current pain level 6/10  Quality pain is described as dull, aching, throbbing, cramping, numb      Current pain medications includes: Cymbalta 30 mg at bedtime, Lyrica 75 mg 3 times daily    The patient reports that this regimen is providing  40% pain relief  The patient is reporting no side effects from this pain medication regimen  I have personally reviewed and/or updated the patient's past medical history, past surgical history, family history, social history, current medications, allergies, and vital signs today  Review of Systems  Review of Systems   Respiratory: Negative for shortness of breath  Cardiovascular: Negative for chest pain  Gastrointestinal: Negative for constipation, diarrhea, nausea and vomiting  Musculoskeletal: Positive for arthralgias, gait problem and myalgias  Negative for joint swelling  Decreased range of motion  Pain in extremity - phantom cramping   Neurological: Negative for dizziness, seizures and weakness  Memory loss     All other systems reviewed and are negative  Past Medical History:   Diagnosis Date    Diabetes mellitus ([facility] )     GERD (gastroesophageal reflux disease)     Hypercholesteremia     Hypertension     Ischemia of right lower extremity with suspected rhabdomyolysis 2/6/2021    Left Hydropneumothorax 2/10/2021    Methadone use ([place] [address] )     Pneumonia due to COVID-19 virus 1/11/2021       Past Surgical History:   Procedure Laterality Date    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    Procedure: AMPUTATION ABOVE KNEE (AKA);   Surgeon: Ameya Palma MD;  [facility]: [facility];  Service: Vascular    AMPUTATION ABOVE KNEE (AKA) Right 1/18/2021    Procedure: AMPUTATION ABOVE KNEE (AKA) FORMALIZATION,  R AKA wound washout, wound closure;  Surgeon: Ameya Palma MD;  [facility]: [facility];  Service: Vascular    ARTERIOGRAM Right 1/13/2021    Procedure: ARTERIOGRAM;  Surgeon: Jian Kay DO;  [facility]: [facility];  Service: Vascular    IR CHEST TUBE PLACEMENT  2/10/2021    IR LOWER EXTREMITY ANGIOGRAM  1/14/2021    THROMBECTOMY W/ EMBOLECTOMY Right 1/13/2021    Procedure: EMBOLECTOMY/THROMBECTOMY LOWER EXTREMITY;  Surgeon: Arnulfo Henriquez DO;  [facility]: [facility];  Service: Vascular       Family History   Problem Relation Age of Onset    Diabetes Mother     Hypertension Father     Leukemia Father     Acute lymphoblastic leukemia Father     Cancer Sister        Social History     Occupational History    Not on file   Tobacco Use    Smoking status: Former Smoker    Smokeless tobacco: Never Used   Vaping Use    Vaping Use: Never used   Substance and Sexual Activity    Alcohol use: Not Currently    Drug use: No     Comment: methadone    Sexual activity: Not on file         Current Outpatient Medications:     acetaminophen (TYLENOL) 325 mg tablet, Take 3 tablets (975 mg total) by mouth every 8 (eight) hours (Patient taking differently: Take 650 mg by mouth 3 (three) times a day as needed for moderate pain ), Disp: , Rfl: 0    apixaban (ELIQUIS) 5 mg, Take 1 tablet (5 mg total) by mouth 2 (two) times a day, Disp: 60 tablet, Rfl: 3    Blood Glucose Monitoring Suppl (OneTouch Verio) w/Device KIT, Use to test blood sugars 3 times daily, Disp: 1 kit, Rfl: 0    cetirizine (ZyrTEC) 10 mg tablet, Take 10 mg by mouth daily, Disp: , Rfl:     cholecalciferol (VITAMIN D3) 1,000 units tablet, Take 2 tablets (2,000 Units total) by mouth daily, Disp: 12 tablet, Rfl: 0    Diclofenac Sodium (VOLTAREN) 1 %, Apply 4 g topically 4 (four) times a day (Patient taking differently: Apply 4 g topically 4 (four) times a day as needed ), Disp: , Rfl: 0    DULoxetine (CYMBALTA) 30 mg delayed release capsule, Take 1 capsule (30 mg total) by mouth daily at bedtime, Disp: 30 capsule, Rfl: 1    Empagliflozin (Jardiance) 25 MG TABS, Take 1 tablet (25 mg total) by mouth every morning, Disp: 90 tablet, Rfl: 3    fluticasone (FLONASE) 50 mcg/act nasal spray, 1 spray into each nostril daily, Disp: , Rfl:     glucose blood test strip, Use as instructed, Disp: 270 each, Rfl: 3    insulin glargine (LANTUS) 100 units/mL subcutaneous injection, Inject 17 Units under the skin daily at bedtime, Disp: 5 mL, Rfl: 0    Insulin Pen Needle 31G X 5 MM MISC, 30 units sq 2X daily, Disp: 100 each, Rfl: 3    Insulin Pen Needle 31G X 5 MM MISC, Use daily Pt to inject 4 X daily, Disp: 100 each, Rfl: 5    lansoprazole (PREVACID) 30 mg capsule, Take 1 capsule (30 mg total) by mouth daily, Disp: 30 capsule, Rfl: 0    lidocaine (LMX) 4 % cream, Apply topically 4 (four) times a day as needed for mild pain, Disp: 30 g, Rfl: 0    lisinopril (ZESTRIL) 10 mg tablet, Take 1 tablet (10 mg total) by mouth daily, Disp: 30 tablet, Rfl: 0    magnesium oxide (MAG-OX) 400 mg, Take 1 tablet (400 mg total) by mouth 2 (two) times a day, Disp: 60 tablet, Rfl: 0    multivitamin-minerals (CENTRUM ADULTS) tablet, Take 1 tablet by mouth daily, Disp: , Rfl: 0    pregabalin (LYRICA) 100 mg capsule, Take 1 capsule (100 mg total) by mouth 3 (three) times a day, Disp: 90 capsule, Rfl: 2    sitaGLIPtin-metFORMIN (JANUMET)  MG per tablet, Take 1 tablet by mouth 2 (two) times a day with meals, Disp: 60 tablet, Rfl: 0    albuterol (PROVENTIL HFA,VENTOLIN HFA) 90 mcg/act inhaler, Inhale 2 puffs every 4 (four) hours as needed for wheezing (Patient not taking: Reported on 6/29/2021), Disp: , Rfl: 0    menthol-methyl salicylate (BENGAY) 20-56 % cream, Apply topically 4 (four) times a day as needed (torticollis) (Patient not taking: Reported on 6/10/2021), Disp: , Rfl: 0    naloxone (NARCAN) 4 mg/0 1 mL nasal spray, Administer 1 spray into a nostril  If no response after 2-3 minutes, give another dose in the other nostril using a new spray   (Patient not taking: Reported on 5/11/2021), Disp: 1 each, Rfl: 1    Allergies   Allergen Reactions    Varenicline        Physical Exam:    /68 (BP Location: Left arm, Patient Position: Sitting, Cuff Size: Standard)   Pulse 84   Temp 98 4 °F (36 9 °C)  5' 8" (1 727 m)   BMI 24 64 kg/m²     Constitutional:normal, well developed, well nourished, alert, in no distress and non-toxic and no overt pain behavior  Eyes:anicteric  HEENT:grossly intact  Neck:supple, symmetric, trachea midline and no masses   Pulmonary:even and unlabored  Cardiovascular:No edema or pitting edema present  Skin:Normal without rashes or lesions and well hydrated  Psychiatric:Mood and affect appropriate  Neurologic:Cranial Nerves II-XII grossly intact  Musculoskeletal:in wheelchair    Imaging  MRI CERVICAL SPINE WITHOUT CONTRAST     INDICATION: M54 12: Radiculopathy, cervical region  Numbness and weakness right upper extremity     COMPARISON:  X-ray 5/20/2021     TECHNIQUE:  Sagittal T1, sagittal T2, sagittal inversion recovery, axial T2, axial  2D merge     IMAGE QUALITY:  Diagnostic     FINDINGS:     ALIGNMENT:  Normal alignment of the cervical spine  No compression fracture  No subluxation  No scoliosis      MARROW SIGNAL:  Normal marrow signal is identified within the visualized bony structures  No discrete marrow lesion      CERVICAL AND VISUALIZED THORACIC CORD:  Normal signal within the visualized cord      PREVERTEBRAL AND PARASPINAL SOFT TISSUES:  Normal      VISUALIZED POSTERIOR FOSSA:  The visualized posterior fossa demonstrates no abnormal signal      CERVICAL DISC SPACES:     C2-C3:  Mild, asymmetric right facet arthrosis      C3-C4:  Minor bulge, minor bilateral facet and uncinate arthrosis more severe in the right  Moderate right foraminal stenosis  Correlate for C4 radiculitis      C4-C5:  Circumferential bulge, right greater than left uncinate and facet arthrosis, moderate right foraminal stenosis  Correlate for right C5 radiculitis      C5-C6:  Bilateral facet and uncinate arthrosis, at least mild right foraminal stenosis      C6-C7:  Circumferential bulge with marginal osteophytes    Slight reduction disc height, only minor bilateral foraminal stenosis      C7-T1: Mild bilateral facet arthrosis      UPPER THORACIC DISC SPACES:  Normal      IMPRESSION:     Multilevel degenerative changes with at least moderate stenosis of the foramen on the right at the C3-C4 and C4-C5 levels  Does patient have right C4 or C5 radiculitis?            MRI CERVICAL SPINE WITHOUT CONTRAST     INDICATION: M54 12: Radiculopathy, cervical region  Numbness and weakness right upper extremity     COMPARISON:  X-ray 5/20/2021     TECHNIQUE:  Sagittal T1, sagittal T2, sagittal inversion recovery, axial T2, axial  2D merge     IMAGE QUALITY:  Diagnostic     FINDINGS:     ALIGNMENT:  Normal alignment of the cervical spine  No compression fracture  No subluxation  No scoliosis      MARROW SIGNAL:  Normal marrow signal is identified within the visualized bony structures  No discrete marrow lesion      CERVICAL AND VISUALIZED THORACIC CORD:  Normal signal within the visualized cord      PREVERTEBRAL AND PARASPINAL SOFT TISSUES:  Normal      VISUALIZED POSTERIOR FOSSA:  The visualized posterior fossa demonstrates no abnormal signal      CERVICAL DISC SPACES:     C2-C3:  Mild, asymmetric right facet arthrosis      C3-C4:  Minor bulge, minor bilateral facet and uncinate arthrosis more severe in the right  Moderate right foraminal stenosis  Correlate for C4 radiculitis      C4-C5:  Circumferential bulge, right greater than left uncinate and facet arthrosis, moderate right foraminal stenosis  Correlate for right C5 radiculitis      C5-C6:  Bilateral facet and uncinate arthrosis, at least mild right foraminal stenosis      C6-C7:  Circumferential bulge with marginal osteophytes  Slight reduction disc height, only minor bilateral foraminal stenosis      C7-T1:  Mild bilateral facet arthrosis      UPPER THORACIC DISC SPACES:  Normal      IMPRESSION:     Multilevel degenerative changes with at least moderate stenosis of the foramen on the right at the C3-C4 and C4-C5 levels    Does patient have right C4 or C5 radiculitis?

## 2021-09-02 ENCOUNTER — HOSPITAL ENCOUNTER (OUTPATIENT)
Dept: RADIOLOGY | Facility: HOSPITAL | Age: 61
Discharge: HOME | End: 2021-09-02
Payer: COMMERCIAL

## 2021-09-02 ENCOUNTER — OFFICE VISIT (OUTPATIENT)
Dept: FAMILY MEDICINE CLINIC | Facility: CLINIC | Age: 61
End: 2021-09-02
Payer: COMMERCIAL

## 2021-09-02 ENCOUNTER — APPOINTMENT (OUTPATIENT)
Dept: LAB | Facility: CLINIC | Age: 61
End: 2021-09-02
Payer: COMMERCIAL

## 2021-09-02 VITALS
TEMPERATURE: 98.2 F | HEIGHT: 68 IN | SYSTOLIC BLOOD PRESSURE: 138 MMHG | WEIGHT: 150 LBS | DIASTOLIC BLOOD PRESSURE: 64 MMHG | BODY MASS INDEX: 22.73 KG/M2

## 2021-09-02 DIAGNOSIS — R53.1 GENERALIZED WEAKNESS: ICD-10-CM

## 2021-09-02 DIAGNOSIS — R09.89 LUNG CRACKLES: ICD-10-CM

## 2021-09-02 DIAGNOSIS — R53.83 OTHER FATIGUE: ICD-10-CM

## 2021-09-02 DIAGNOSIS — E11.59 TYPE 2 DIABETES MELLITUS WITH OTHER CIRCULATORY COMPLICATION, WITH LONG-TERM CURRENT USE OF INSULIN (HCC): Primary | ICD-10-CM

## 2021-09-02 DIAGNOSIS — Z89.611 S/P AKA (ABOVE KNEE AMPUTATION), RIGHT (HCC): ICD-10-CM

## 2021-09-02 DIAGNOSIS — D64.9 ANEMIA, UNSPECIFIED TYPE: ICD-10-CM

## 2021-09-02 DIAGNOSIS — Z79.4 TYPE 2 DIABETES MELLITUS WITH DIABETIC POLYNEUROPATHY, WITH LONG-TERM CURRENT USE OF INSULIN (HCC): ICD-10-CM

## 2021-09-02 DIAGNOSIS — Z79.4 TYPE 2 DIABETES MELLITUS WITH OTHER CIRCULATORY COMPLICATION, WITH LONG-TERM CURRENT USE OF INSULIN (HCC): Primary | ICD-10-CM

## 2021-09-02 DIAGNOSIS — E11.42 TYPE 2 DIABETES MELLITUS WITH DIABETIC POLYNEUROPATHY, WITH LONG-TERM CURRENT USE OF INSULIN (HCC): ICD-10-CM

## 2021-09-02 DIAGNOSIS — M89.8X5 PAIN IN RIGHT FEMUR: ICD-10-CM

## 2021-09-02 LAB
ALBUMIN SERPL BCP-MCNC: 3.1 G/DL (ref 3.5–5)
ALP SERPL-CCNC: 155 U/L (ref 46–116)
ALT SERPL W P-5'-P-CCNC: 29 U/L (ref 12–78)
ANION GAP SERPL CALCULATED.3IONS-SCNC: 4 MMOL/L (ref 4–13)
AST SERPL W P-5'-P-CCNC: 21 U/L (ref 5–45)
BASOPHILS # BLD AUTO: 0.05 THOUSANDS/ΜL (ref 0–0.1)
BASOPHILS NFR BLD AUTO: 1 % (ref 0–1)
BILIRUB SERPL-MCNC: 0.31 MG/DL (ref 0.2–1)
BUN SERPL-MCNC: 31 MG/DL (ref 5–25)
CALCIUM ALBUM COR SERPL-MCNC: 10.1 MG/DL (ref 8.3–10.1)
CALCIUM SERPL-MCNC: 9.4 MG/DL (ref 8.3–10.1)
CHLORIDE SERPL-SCNC: 102 MMOL/L (ref 100–108)
CO2 SERPL-SCNC: 29 MMOL/L (ref 21–32)
CREAT SERPL-MCNC: 1.05 MG/DL (ref 0.6–1.3)
EOSINOPHIL # BLD AUTO: 0.17 THOUSAND/ΜL (ref 0–0.61)
EOSINOPHIL NFR BLD AUTO: 3 % (ref 0–6)
ERYTHROCYTE [DISTWIDTH] IN BLOOD BY AUTOMATED COUNT: 13 % (ref 11.6–15.1)
EST. AVERAGE GLUCOSE BLD GHB EST-MCNC: 192 MG/DL
FERRITIN SERPL-MCNC: 186 NG/ML (ref 8–388)
GFR SERPL CREATININE-BSD FRML MDRD: 75 ML/MIN/1.73SQ M
GLUCOSE P FAST SERPL-MCNC: 265 MG/DL (ref 65–99)
HBA1C MFR BLD: 8.3 %
HCT VFR BLD AUTO: 42.2 % (ref 36.5–49.3)
HGB BLD-MCNC: 13.7 G/DL (ref 12–17)
IMM GRANULOCYTES # BLD AUTO: 0.02 THOUSAND/UL (ref 0–0.2)
IMM GRANULOCYTES NFR BLD AUTO: 0 % (ref 0–2)
IRON SATN MFR SERPL: 19 %
IRON SERPL-MCNC: 61 UG/DL (ref 65–175)
LYMPHOCYTES # BLD AUTO: 1.71 THOUSANDS/ΜL (ref 0.6–4.47)
LYMPHOCYTES NFR BLD AUTO: 27 % (ref 14–44)
MCH RBC QN AUTO: 28.2 PG (ref 26.8–34.3)
MCHC RBC AUTO-ENTMCNC: 32.5 G/DL (ref 31.4–37.4)
MCV RBC AUTO: 87 FL (ref 82–98)
MONOCYTES # BLD AUTO: 0.6 THOUSAND/ΜL (ref 0.17–1.22)
MONOCYTES NFR BLD AUTO: 9 % (ref 4–12)
NEUTROPHILS # BLD AUTO: 3.87 THOUSANDS/ΜL (ref 1.85–7.62)
NEUTS SEG NFR BLD AUTO: 60 % (ref 43–75)
NRBC BLD AUTO-RTO: 0 /100 WBCS
PLATELET # BLD AUTO: 198 THOUSANDS/UL (ref 149–390)
PMV BLD AUTO: 11.8 FL (ref 8.9–12.7)
POTASSIUM SERPL-SCNC: 4.9 MMOL/L (ref 3.5–5.3)
PROT SERPL-MCNC: 8.7 G/DL (ref 6.4–8.2)
RBC # BLD AUTO: 4.86 MILLION/UL (ref 3.88–5.62)
SODIUM SERPL-SCNC: 135 MMOL/L (ref 136–145)
TIBC SERPL-MCNC: 316 UG/DL (ref 250–450)
VIT B12 SERPL-MCNC: 1116 PG/ML (ref 100–900)
WBC # BLD AUTO: 6.42 THOUSAND/UL (ref 4.31–10.16)

## 2021-09-02 PROCEDURE — 82607 VITAMIN B-12: CPT

## 2021-09-02 PROCEDURE — 71046 X-RAY EXAM CHEST 2 VIEWS: CPT

## 2021-09-02 PROCEDURE — 73552 X-RAY EXAM OF FEMUR 2/>: CPT

## 2021-09-02 PROCEDURE — 85025 COMPLETE CBC W/AUTO DIFF WBC: CPT

## 2021-09-02 PROCEDURE — 80053 COMPREHEN METABOLIC PANEL: CPT

## 2021-09-02 PROCEDURE — 83540 ASSAY OF IRON: CPT

## 2021-09-02 PROCEDURE — 83550 IRON BINDING TEST: CPT

## 2021-09-02 PROCEDURE — 36415 COLL VENOUS BLD VENIPUNCTURE: CPT

## 2021-09-02 PROCEDURE — 3052F HG A1C>EQUAL 8.0%<EQUAL 9.0%: CPT | Performed by: NURSE PRACTITIONER

## 2021-09-02 PROCEDURE — 82728 ASSAY OF FERRITIN: CPT

## 2021-09-02 PROCEDURE — 99214 OFFICE O/P EST MOD 30 MIN: CPT | Performed by: NURSE PRACTITIONER

## 2021-09-02 PROCEDURE — 83036 HEMOGLOBIN GLYCOSYLATED A1C: CPT

## 2021-09-02 NOTE — PROGRESS NOTES
Assessment/Plan:    No problem-specific Assessment & Plan notes found for this encounter  Diagnoses and all orders for this visit:    Type 2 diabetes mellitus with other circulatory complication, with long-term current use of insulin (HCC)  -     glucose blood test strip; Use as instructed  -     HEMOGLOBIN A1C W/ EAG ESTIMATION; Future  -     Comprehensive metabolic panel; Future  -     CBC and differential; Future    Lung crackles  -     XR chest pa & lateral; Future    Other fatigue  -     XR chest pa & lateral; Future    Generalized weakness  -     Ambulatory referral to Physical Therapy; Future    S/P AKA (above knee amputation), right ([address] )  -     Ambulatory referral to Physical Therapy; Future    Pain in right femur  -     XR femur 2 vw right; Future          Subjective:      Patient ID: Jimmy Ornelas is a 59 y o  male  Patient presents for three month diabetes follow up  Patient had his labs this morning and they are not yet resulted- will call patient with results and determine medication adjustment based on lab values  Patient states that his blood sugars are generally running around 280 in the mornings  He is not checking his sugars more than once a day  Patient is not following diabetic diet  Eating lots of potatoes and cereals  Patient states that he has been feeling increase in fatigue over the last few weeks  He states that when he is watching T V  sometimes he just wants to fall asleep  Crackles heard on examination today- denies shortness of breath, cough, fevers, chills or muscle pains  Patient states that over the last day he has been having pain on the anterior aspect of the right thigh above his stump  He states that it hurts to touch the area  He denies any fall or injuries  Will order chest and femur x-ray for patient  Patient states that since he left rehab and stopped PT he feels like his strength has worsened     Constipation- Patient states that sometimes it is hard to pass his stools  Patient states that he has been taking colace but it does not help significantly  Drinks about 4 bottles of water a day  States he tries to eat adequate fruits and vegetables  Encouraged patient to try fiber supplement on a daily basis and increase fluids due to immobility  Diabetes  He presents for his follow-up diabetic visit  He has type 2 diabetes mellitus  Hypoglycemia symptoms include confusion and tremors  Pertinent negatives for hypoglycemia include no dizziness, headaches, hunger, mood changes, nervousness/anxiousness, pallor, seizures, sleepiness, speech difficulty or sweats  Associated symptoms include fatigue and foot paresthesias  Pertinent negatives for diabetes include no blurred vision, no chest pain, no polydipsia, no polyphagia, no polyuria, no visual change, no weakness and no weight loss  Hypoglycemia complications include required assistance (must eat or drink to increase sugars)  Symptoms are stable  Diabetic complications include nephropathy and peripheral neuropathy  Risk factors for coronary artery disease include diabetes mellitus, dyslipidemia, hypertension, sedentary lifestyle and male sex  Current diabetic treatment includes oral agent (dual therapy) and insulin injections  He is compliant with treatment most of the time  He is currently taking insulin at bedtime  Rotation sites for injection include the abdominal wall  His weight is stable  He is following a generally unhealthy diet  When asked about meal planning, he reported none  He never participates in exercise  There is no change in his home blood glucose trend  His breakfast blood glucose range is generally >200 mg/dl  An ACE inhibitor/angiotensin II receptor blocker is being taken  He sees a podiatrist Eye exam is not current         The following portions of the patient's history were reviewed and updated as appropriate: allergies, current medications, past family history, past medical history, past social history, past surgical history and problem list     Review of Systems   Constitutional: Positive for fatigue  Negative for activity change, appetite change, fever, unexpected weight change and weight loss  Eyes: Negative for blurred vision  Respiratory: Negative for chest tightness, shortness of breath, wheezing and stridor  Cardiovascular: Negative for chest pain, palpitations and leg swelling  Gastrointestinal: Positive for constipation  Negative for abdominal distention, diarrhea, nausea and vomiting  Endocrine: Negative for polydipsia, polyphagia and polyuria  Musculoskeletal: Positive for arthralgias  Negative for joint swelling, myalgias, neck pain and neck stiffness  Gait problem: wheelchair    Skin: Negative for pallor  Neurological: Positive for tremors  Negative for dizziness, seizures, speech difficulty, weakness and headaches  Psychiatric/Behavioral: Positive for confusion  The patient is not nervous/anxious  Objective:      /64   Temp 98 2 °F (36 8 °C)   Ht 5' 8" (1 727 m)   Wt 68 kg (150 lb)   BMI 22 81 kg/m²          Physical Exam  Constitutional:       General: He is not in acute distress  Appearance: Normal appearance  He is not ill-appearing or toxic-appearing  HENT:      Head: Normocephalic and atraumatic  Cardiovascular:      Rate and Rhythm: Normal rate and regular rhythm  Pulses: Normal pulses  Heart sounds: Normal heart sounds  No murmur heard  No friction rub  No gallop  Pulmonary:      Effort: Pulmonary effort is normal  No respiratory distress  Breath sounds: Normal breath sounds  No stridor  No wheezing, rhonchi or rales  Abdominal:      General: Abdomen is flat  Bowel sounds are normal  There is no distension  Palpations: Abdomen is soft  There is no mass  Tenderness: There is no abdominal tenderness  There is no guarding or rebound  Musculoskeletal:      Cervical back: Normal range of motion and neck supple   No rigidity or tenderness  Right upper leg: Swelling, tenderness and bony tenderness present  No edema, deformity or lacerations  Left upper leg: Normal         Legs:    Lymphadenopathy:      Cervical: No cervical adenopathy  Skin:     General: Skin is warm and dry  Capillary Refill: Capillary refill takes less than 2 seconds  Coloration: Skin is not pale  Findings: No bruising or lesion  Neurological:      General: No focal deficit present  Mental Status: He is alert and oriented to person, place, and time  Mental status is at baseline  Sensory: No sensory deficit  Motor: No weakness  Gait: Gait normal    Psychiatric:         Mood and Affect: Mood normal          Behavior: Behavior normal          Thought Content:  Thought content normal          Judgment: Judgment normal

## 2021-09-13 ENCOUNTER — APPOINTMENT (INPATIENT)
Dept: MRI IMAGING | Facility: HOSPITAL | Age: 61
DRG: 344 | End: 2021-09-13
Payer: COMMERCIAL

## 2021-09-13 ENCOUNTER — HOSPITAL ENCOUNTER (INPATIENT)
Facility: HOSPITAL | Age: 61
LOS: 2 days | Discharge: HOME HEALTH | DRG: 344 | End: 2021-09-15
Attending: EMERGENCY MEDICINE | Admitting: INTERNAL MEDICINE
Payer: COMMERCIAL

## 2021-09-13 DIAGNOSIS — S78.111A ABOVE-KNEE AMPUTATION OF RIGHT LOWER EXTREMITY (HCC): ICD-10-CM

## 2021-09-13 DIAGNOSIS — M86.9 OSTEOMYELITIS (HCC): Primary | ICD-10-CM

## 2021-09-13 PROBLEM — M86.251 SUBACUTE OSTEOMYELITIS OF RIGHT FEMUR (HCC): Status: ACTIVE | Noted: 2021-09-13

## 2021-09-13 LAB
ANION GAP SERPL CALCULATED.3IONS-SCNC: 9 MMOL/L (ref 4–13)
BASOPHILS # BLD AUTO: 0.1 THOUSANDS/ΜL (ref 0–0.1)
BASOPHILS NFR BLD AUTO: 1 % (ref 0–2)
BUN SERPL-MCNC: 33 MG/DL (ref 7–25)
CALCIUM SERPL-MCNC: 9.6 MG/DL (ref 8.6–10.5)
CHLORIDE SERPL-SCNC: 95 MMOL/L (ref 98–107)
CO2 SERPL-SCNC: 27 MMOL/L (ref 21–31)
CREAT SERPL-MCNC: 1.01 MG/DL (ref 0.7–1.3)
CRP SERPL QL: 14.9 MG/L
EOSINOPHIL # BLD AUTO: 0.1 THOUSAND/ΜL (ref 0–0.61)
EOSINOPHIL NFR BLD AUTO: 2 % (ref 0–5)
ERYTHROCYTE [DISTWIDTH] IN BLOOD BY AUTOMATED COUNT: 13.4 % (ref 11.5–14.5)
ERYTHROCYTE [SEDIMENTATION RATE] IN BLOOD: 72 MM/HOUR (ref 0–19)
GFR SERPL CREATININE-BSD FRML MDRD: 79 ML/MIN/1.73SQ M
GLUCOSE SERPL-MCNC: 246 MG/DL (ref 65–140)
GLUCOSE SERPL-MCNC: 352 MG/DL (ref 65–99)
HCT VFR BLD AUTO: 38.1 % (ref 42–47)
HGB BLD-MCNC: 12.6 G/DL (ref 14–18)
LACTATE SERPL-SCNC: 1.5 MMOL/L (ref 0.5–2)
LACTATE SERPL-SCNC: 2.1 MMOL/L (ref 0.5–2)
LYMPHOCYTES # BLD AUTO: 1.7 THOUSANDS/ΜL (ref 0.6–4.47)
LYMPHOCYTES NFR BLD AUTO: 21 % (ref 21–51)
MCH RBC QN AUTO: 27.8 PG (ref 26–34)
MCHC RBC AUTO-ENTMCNC: 33 G/DL (ref 31–37)
MCV RBC AUTO: 84 FL (ref 81–99)
MONOCYTES # BLD AUTO: 0.6 THOUSAND/ΜL (ref 0.17–1.22)
MONOCYTES NFR BLD AUTO: 8 % (ref 2–12)
NEUTROPHILS # BLD AUTO: 5.6 THOUSANDS/ΜL (ref 1.4–6.5)
NEUTS SEG NFR BLD AUTO: 69 % (ref 42–75)
PLATELET # BLD AUTO: 227 THOUSANDS/UL (ref 149–390)
PMV BLD AUTO: 9 FL (ref 8.6–11.7)
POTASSIUM SERPL-SCNC: 4.8 MMOL/L (ref 3.5–5.5)
RBC # BLD AUTO: 4.52 MILLION/UL (ref 4.3–5.9)
SODIUM SERPL-SCNC: 131 MMOL/L (ref 134–143)
WBC # BLD AUTO: 8.1 THOUSAND/UL (ref 4.8–10.8)

## 2021-09-13 PROCEDURE — 99285 EMERGENCY DEPT VISIT HI MDM: CPT

## 2021-09-13 PROCEDURE — 87040 BLOOD CULTURE FOR BACTERIA: CPT | Performed by: PHYSICIAN ASSISTANT

## 2021-09-13 PROCEDURE — 86140 C-REACTIVE PROTEIN: CPT | Performed by: PHYSICIAN ASSISTANT

## 2021-09-13 PROCEDURE — 82948 REAGENT STRIP/BLOOD GLUCOSE: CPT

## 2021-09-13 PROCEDURE — 99223 1ST HOSP IP/OBS HIGH 75: CPT | Performed by: INTERNAL MEDICINE

## 2021-09-13 PROCEDURE — 80048 BASIC METABOLIC PNL TOTAL CA: CPT | Performed by: PHYSICIAN ASSISTANT

## 2021-09-13 PROCEDURE — 85025 COMPLETE CBC W/AUTO DIFF WBC: CPT | Performed by: PHYSICIAN ASSISTANT

## 2021-09-13 PROCEDURE — 83605 ASSAY OF LACTIC ACID: CPT | Performed by: PHYSICIAN ASSISTANT

## 2021-09-13 PROCEDURE — 85652 RBC SED RATE AUTOMATED: CPT | Performed by: PHYSICIAN ASSISTANT

## 2021-09-13 PROCEDURE — 73718 MRI LOWER EXTREMITY W/O DYE: CPT

## 2021-09-13 PROCEDURE — 83605 ASSAY OF LACTIC ACID: CPT | Performed by: INTERNAL MEDICINE

## 2021-09-13 PROCEDURE — G1004 CDSM NDSC: HCPCS

## 2021-09-13 PROCEDURE — 36415 COLL VENOUS BLD VENIPUNCTURE: CPT | Performed by: PHYSICIAN ASSISTANT

## 2021-09-13 PROCEDURE — 99285 EMERGENCY DEPT VISIT HI MDM: CPT | Performed by: PHYSICIAN ASSISTANT

## 2021-09-13 RX ORDER — PANTOPRAZOLE SODIUM 40 MG/1
40 TABLET, DELAYED RELEASE ORAL
Status: DISCONTINUED | OUTPATIENT
Start: 2021-09-14 | End: 2021-09-15 | Stop reason: HOSPADM

## 2021-09-13 RX ORDER — PREGABALIN 100 MG/1
100 CAPSULE ORAL 3 TIMES DAILY
Status: DISCONTINUED | OUTPATIENT
Start: 2021-09-13 | End: 2021-09-15 | Stop reason: HOSPADM

## 2021-09-13 RX ORDER — ALBUTEROL SULFATE 90 UG/1
2 AEROSOL, METERED RESPIRATORY (INHALATION) EVERY 4 HOURS PRN
Status: DISCONTINUED | OUTPATIENT
Start: 2021-09-13 | End: 2021-09-15 | Stop reason: HOSPADM

## 2021-09-13 RX ORDER — CEFEPIME HYDROCHLORIDE 2 G/50ML
2000 INJECTION, SOLUTION INTRAVENOUS EVERY 12 HOURS
Status: DISCONTINUED | OUTPATIENT
Start: 2021-09-13 | End: 2021-09-15

## 2021-09-13 RX ORDER — VANCOMYCIN HYDROCHLORIDE 1 G/200ML
15 INJECTION, SOLUTION INTRAVENOUS EVERY 12 HOURS
Status: DISCONTINUED | OUTPATIENT
Start: 2021-09-13 | End: 2021-09-15

## 2021-09-13 RX ORDER — LISINOPRIL 10 MG/1
10 TABLET ORAL DAILY
Status: DISCONTINUED | OUTPATIENT
Start: 2021-09-14 | End: 2021-09-15 | Stop reason: HOSPADM

## 2021-09-13 RX ORDER — SODIUM CHLORIDE, SODIUM GLUCONATE, SODIUM ACETATE, POTASSIUM CHLORIDE, MAGNESIUM CHLORIDE, SODIUM PHOSPHATE, DIBASIC, AND POTASSIUM PHOSPHATE .53; .5; .37; .037; .03; .012; .00082 G/100ML; G/100ML; G/100ML; G/100ML; G/100ML; G/100ML; G/100ML
75 INJECTION, SOLUTION INTRAVENOUS ONCE
Status: COMPLETED | OUTPATIENT
Start: 2021-09-13 | End: 2021-09-13

## 2021-09-13 RX ORDER — FLUTICASONE PROPIONATE 50 MCG
1 SPRAY, SUSPENSION (ML) NASAL DAILY
Status: DISCONTINUED | OUTPATIENT
Start: 2021-09-14 | End: 2021-09-15 | Stop reason: HOSPADM

## 2021-09-13 RX ORDER — INSULIN GLARGINE 100 [IU]/ML
17 INJECTION, SOLUTION SUBCUTANEOUS
Status: DISCONTINUED | OUTPATIENT
Start: 2021-09-13 | End: 2021-09-14

## 2021-09-13 RX ORDER — ONDANSETRON 2 MG/ML
4 INJECTION INTRAMUSCULAR; INTRAVENOUS EVERY 6 HOURS PRN
Status: DISCONTINUED | OUTPATIENT
Start: 2021-09-13 | End: 2021-09-15 | Stop reason: HOSPADM

## 2021-09-13 RX ORDER — MELATONIN
2000 DAILY
Status: DISCONTINUED | OUTPATIENT
Start: 2021-09-14 | End: 2021-09-15 | Stop reason: HOSPADM

## 2021-09-13 RX ORDER — DULOXETIN HYDROCHLORIDE 30 MG/1
30 CAPSULE, DELAYED RELEASE ORAL
Status: DISCONTINUED | OUTPATIENT
Start: 2021-09-13 | End: 2021-09-15 | Stop reason: HOSPADM

## 2021-09-13 RX ORDER — VANCOMYCIN HYDROCHLORIDE 1 G/200ML
15 INJECTION, SOLUTION INTRAVENOUS EVERY 12 HOURS
Status: DISCONTINUED | OUTPATIENT
Start: 2021-09-13 | End: 2021-09-13

## 2021-09-13 RX ORDER — ACETAMINOPHEN 325 MG/1
650 TABLET ORAL EVERY 6 HOURS PRN
Status: DISCONTINUED | OUTPATIENT
Start: 2021-09-13 | End: 2021-09-15 | Stop reason: HOSPADM

## 2021-09-13 RX ADMIN — APIXABAN 5 MG: 5 TABLET, FILM COATED ORAL at 18:00

## 2021-09-13 RX ADMIN — CEFEPIME HYDROCHLORIDE 2000 MG: 2 INJECTION, SOLUTION INTRAVENOUS at 17:15

## 2021-09-13 RX ADMIN — INSULIN LISPRO 2 UNITS: 100 INJECTION, SOLUTION INTRAVENOUS; SUBCUTANEOUS at 22:38

## 2021-09-13 RX ADMIN — VANCOMYCIN HYDROCHLORIDE 1000 MG: 1 INJECTION, SOLUTION INTRAVENOUS at 17:40

## 2021-09-13 RX ADMIN — DULOXETINE 30 MG: 30 CAPSULE, DELAYED RELEASE ORAL at 22:37

## 2021-09-13 RX ADMIN — PREGABALIN 100 MG: 100 CAPSULE ORAL at 21:30

## 2021-09-13 RX ADMIN — INSULIN GLARGINE 17 UNITS: 100 INJECTION, SOLUTION SUBCUTANEOUS at 22:37

## 2021-09-13 RX ADMIN — SODIUM CHLORIDE, SODIUM GLUCONATE, SODIUM ACETATE, POTASSIUM CHLORIDE, MAGNESIUM CHLORIDE, SODIUM PHOSPHATE, DIBASIC, AND POTASSIUM PHOSPHATE 75 ML/HR: .53; .5; .37; .037; .03; .012; .00082 INJECTION, SOLUTION INTRAVENOUS at 17:13

## 2021-09-13 RX ADMIN — PREGABALIN 100 MG: 100 CAPSULE ORAL at 17:11

## 2021-09-13 RX ADMIN — MAGNESIUM OXIDE TAB 400 MG (241.3 MG ELEMENTAL MG) 400 MG: 400 (241.3 MG) TAB at 18:00

## 2021-09-13 NOTE — H&P
[address] [facility]  H&P- Lee Ann Heart 1960, 59 y o  male MRN: [id number]  Unit/Bed#: ED 11 Encounter: [id number]  Primary Care Provider: VERONICA Morgan   Date and time admitted to hospital: 9/13/2021  2:11 PM    * Subacute osteomyelitis of right femur [facility])  Assessment & Plan  · Recent x-ray of right femur shows findings concerning for possible osteomyelitis  · Will check MRI  · Start patient on antibiotics for now cefepime/vancomycin with pharmacy consult for vanco dosing  · Orthopedic consult  · Pain control as needed  · CRP/ESR elevated    S/P AKA (above knee amputation), right (HCC)  Assessment & Plan  · Supportive care  · PT/OT eval  · Follow-up MRI results    Type 2 diabetes mellitus, with long-term current use of insulin ([facility])  Assessment & Plan  · Will hold oral diabetic medications for now  · Insulin sliding scale  · Continue Lantus 17 units at bedtime  · Diabetic diet    Vitamin D deficiency  Assessment & Plan  · Continue vitamin-D supplementation    Essential hypertension  Assessment & Plan  · Blood pressure currently stable  · Continue lisinopril    VTE Prophylaxis: Apixaban (Eliquis)  Code Status:  Full code  Discussion with family:  Spoke with nephew at bedside    Anticipated Length of Stay:  Patient will be admitted on an Inpatient basis with an anticipated length of stay of  > 2 midnights  Justification for Hospital Stay:  Rule out osteomyelitis    Chief Complaint:   Abnormal x-ray findings    History of Present Illness:    Lee Ann Heart is a 59 y o  male who presents with right stump pain  Patient was sent to the ER for further evaluation due to suspected osteomyelitis  Patient had x-ray done of right femur on 09/11/2021 with results showing possible osteomyelitis  Patient has history of AKA on the right and has been having right stump pain for a few weeks now  Patient has also been having generalized weakness/fatigue  Denies any fever or chills    No nausea vomiting  Review of Systems:    Review of Systems   Constitutional: Positive for activity change and fatigue  Negative for chills and fever  Respiratory: Negative for shortness of breath  Cardiovascular: Negative for chest pain  Gastrointestinal: Negative for abdominal pain  Musculoskeletal:        Pain of right AKA stump   All other systems reviewed and are negative  Past Medical and Surgical History:     Past Medical History:   Diagnosis Date    Arthritis     [facility]     GERD (gastroesophageal reflux disease)     Hypercholesteremia     Hypertension     Ischemia of right lower extremity with suspected rhabdomyolysis 2/6/2021    Left Hydropneumothorax 2/10/2021    [facility]adone use ([facility] )     Pneumonia due to COVID-19 virus 1/11/2021       Past Surgical History:   Procedure Laterality Date    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    Procedure: AMPUTATION ABOVE KNEE (AKA); Surgeon: Tawanda Peterson MD;  [facility];  Service: Vascular    AMPUTATION ABOVE KNEE (AKA) Right 1/18/2021    Procedure: AMPUTATION ABOVE KNEE (AKA) FORMALIZATION,  R AKA wound washout, wound closure;  Surgeon: Tawanda Peterson MD;  [facility];  Service: Vascular    ARTERIOGRAM Right 1/13/2021    Procedure: ARTERIOGRAM;  Surgeon: Rohith France DO;  [facility];  Service: Vascular    IR CHEST TUBE PLACEMENT  2/10/2021    IR LOWER EXTREMITY ANGIOGRAM  1/14/2021    THROMBECTOMY W/ EMBOLECTOMY Right 1/13/2021    Procedure: EMBOLECTOMY/THROMBECTOMY LOWER EXTREMITY;  Surgeon: Rohith France DO;  [facility];  Service: Vascular       Meds/Allergies:    Prior to Admission medications    Medication Sig Start Date End Date Taking?  Authorizing Provider   acetaminophen (TYLENOL) 325 mg tablet Take 3 tablets (975 mg total) by mouth every 8 (eight) hours  Patient taking differently: Take 650 mg by mouth 3 (three) times a day as needed for moderate pain  2/25/21 Koby Handy MD   albuterol (PROVENTIL HFA,VENTOLIN HFA) 90 mcg/act inhaler Inhale 2 puffs every 4 (four) hours as needed for wheezing 3/29/21   VERONICA Edmonds   apixaban (ELIQUIS) 5 mg Take 1 tablet (5 mg total) by mouth 2 (two) times a day 8/10/21   VERONICA Edmonds   Blood Glucose Monitoring Suppl (OneTouch Verio) w/Device KIT Use to test blood sugars 3 times daily 7/19/21   VERONICA Edmonds   cetirizine (ZyrTEC) 10 mg tablet Take 10 mg by mouth daily 3/24/21   Historical Provider, MD   cholecalciferol (VITAMIN D3) 1,000 units tablet Take 2 tablets (2,000 Units total) by mouth daily 4/27/21   VERONICA Edmonds   Diclofenac Sodium (VOLTAREN) 1 % Apply 4 g topically 4 (four) times a day  Patient taking differently: Apply 4 g topically 4 (four) times a day as needed  2/25/21   Koby Handy MD   DULoxetine (CYMBALTA) 30 mg delayed release capsule Take 1 capsule (30 mg total) by mouth daily at bedtime 8/20/21   VERONICA Love   Empagliflozin (Jardiance) 25 MG TABS Take 1 tablet (25 mg total) by mouth every morning 8/2/21   VERONICA Edmonds   fluticasone [facility]) 50 mcg/act nasal spray 1 spray into each nostril daily 3/25/21 3/25/22  Historical Provider, MD   glucose blood test strip Use as instructed 9/2/21   VERONICA Edmonds   insulin glargine (LANTUS) 100 units/mL subcutaneous injection Inject 17 Units under the skin daily at bedtime 6/24/21   Margarita Acosta PA-C   Insulin Pen Needle 31G X 5 MM MISC 30 units sq 2X daily 1/3/19   VERONICA Fields   Insulin Pen Needle 31G X 5 MM MISC Use daily Pt to inject 4 X daily 6/1/21   VERONICA Edmonds   lansoprazole (PREVACID) 30 mg capsule Take 1 capsule (30 mg total) by mouth daily 6/24/21   Margarita Acosta PA-C   lidocaine (LMX) 4 % cream Apply topically 4 (four) times a day as needed for mild pain 6/24/21   Margarita Acosta PA-C   lisinopril (ZESTRIL) 10 mg tablet Take 1 tablet (10 mg total) by mouth daily 6/24/21   Rima Lou PA-C   magnesium oxide (MAG-OX) 400 mg Take 1 tablet (400 mg total) by mouth 2 (two) times a day 6/24/21   Rima Lou PA-C   menthol-methyl salicylate (BENGAY) 90-88 % cream Apply topically 4 (four) times a day as needed (torticollis)  Patient not taking: Reported on 6/10/2021 2/25/21   Martin Kebede MD   multivitamin-minerals (CENTRUM ADULTS) tablet Take 1 tablet by mouth daily 2/26/21   Martin Kebede MD   naloxone [facility]) 4 mg/0 1 mL nasal spray Administer 1 spray into a nostril  If no response after 2-3 minutes, give another dose in the other nostril using a new spray  Patient not taking: Reported on 5/11/2021 3/29/21   VERONICA Foley   pregabalin (LYRICA) 100 mg capsule Take 1 capsule (100 mg total) by mouth 3 (three) times a day 8/20/21 9/19/21  VERONICA Olivarez   sitaGLIPtin-metFORMIN (JANUMET)  MG per tablet Take 1 tablet by mouth 2 (two) times a day with meals 6/24/21   Rima Lou PA-C     all medications and allergies reviewed    Allergies:    Allergies   Allergen Reactions    Varenicline        Social History:     Marital Status:    Occupation:  None  Patient Pre-hospital Living Situation:  Lives with nephew  Patient Pre-hospital Level of Mobility:  Patient does have a prosthesis however has been having difficulty with prosthesis, wheelchair bound due to right AKA  Patient Pre-hospital Diet Restrictions:  None  Substance Use History:   Social History     Substance and Sexual Activity   Alcohol Use Not Currently     Social History     Tobacco Use   Smoking Status Former Smoker   Smokeless Tobacco Never Used     Social History     Substance and Sexual Activity   Drug Use No    Comment: methadone       Family History:  I have reviewed the patient's family history    Physical Exam:     Vitals:   Blood Pressure: 130/76 (09/13/21 1417)  Pulse: 88 (09/13/21 1417)  Temperature: 98 6 °F (37 °C) (09/13/21 1417)  Temp Source: Tympanic (09/13/21 1417)  Respirations: 18 (09/13/21 1417)  Weight - Scale: 68 kg (150 lb) (09/13/21 1417)  SpO2: 95 % (09/13/21 1417)    Physical Exam  Constitutional:       General: He is not in acute distress  Comments: Frail  male   HENT:      Head: Normocephalic and atraumatic  Nose: Nose normal       Mouth/Throat:      Mouth: Mucous membranes are moist    Eyes:      Extraocular Movements: Extraocular movements intact  Conjunctiva/sclera: Conjunctivae normal    Cardiovascular:      Rate and Rhythm: Normal rate and regular rhythm  Pulmonary:      Effort: Pulmonary effort is normal  No respiratory distress  Abdominal:      Palpations: Abdomen is soft  Tenderness: There is no abdominal tenderness  Musculoskeletal:         General: Normal range of motion  Cervical back: Normal range of motion and neck supple  Comments: Right AKA noted with mild tenderness at stump site   Skin:     General: Skin is warm and dry  Comments: Scar of right lower extremity stump noted   Neurological:      General: No focal deficit present  Mental Status: He is alert  Cranial Nerves: No cranial nerve deficit  Psychiatric:         Mood and Affect: Mood normal          Behavior: Behavior normal          Additional Data:     Lab Results: I have personally reviewed pertinent reports        Results from last 7 days   Lab Units 09/13/21  1424   WBC Thousand/uL 8 10   HEMOGLOBIN g/dL 12 6*   HEMATOCRIT % 38 1*   PLATELETS Thousands/uL 227   NEUTROS PCT % 69   LYMPHS PCT % 21   MONOS PCT % 8   EOS PCT % 2     Results from last 7 days   Lab Units 09/13/21  1424   SODIUM mmol/L 131*   POTASSIUM mmol/L 4 8   CHLORIDE mmol/L 95*   CO2 mmol/L 27   BUN mg/dL 33*   CREATININE mg/dL 1 01   ANION GAP mmol/L 9   CALCIUM mg/dL 9 6   GLUCOSE RANDOM mg/dL 352*                 Results from last 7 days   Lab Units 09/13/21  1424   LACTIC ACID mmol/L 2 1*       Imaging: I have personally reviewed pertinent reports  MRI inpatient order    (Results Pending)     [facility] / Care Everywhere Records Reviewed: Yes    ** Please Note: This note has been constructed using a voice recognition system   **

## 2021-09-13 NOTE — ED PROVIDER NOTES
History  Chief Complaint   Patient presents with    Evaluation of Abnormal Diagnostic Test     47-year-old male approximately 6 months status post right leg AKA presents accompanied by his nephew complaining stump pain  Patient reports he has not been feeling well over the past 2 weeks  Increase fatigue with some nausea and vomiting  Patient was called by his PCP earlier today with findings of possible osteomyelitis seen on plain film x-ray of his right femur  Patient reports increased pain in the affected area  Denies any fevers but does report some chills  Denies any chest pain, shortness of breath, abdominal pain or any other complaints at this time  Prior to Admission Medications   Prescriptions Last Dose Informant Patient Reported? Taking?    Blood Glucose Monitoring Suppl (OneTouch Verio) w/Device KIT   No No   Sig: Use to test blood sugars 3 times daily   DULoxetine (CYMBALTA) 30 mg delayed release capsule   No No   Sig: Take 1 capsule (30 mg total) by mouth daily at bedtime   Diclofenac Sodium (VOLTAREN) 1 %  Family Member No No   Sig: Apply 4 g topically 4 (four) times a day   Patient taking differently: Apply 4 g topically 4 (four) times a day as needed    Empagliflozin (Jardiance) 25 MG TABS   No No   Sig: Take 1 tablet (25 mg total) by mouth every morning   Insulin Pen Needle 31G X 5 MM MISC  Family Member No No   Si units sq 2X daily   Insulin Pen Needle 31G X 5 MM MISC   No No   Sig: Use daily Pt to inject 4 X daily   acetaminophen (TYLENOL) 325 mg tablet  Family Member No No   Sig: Take 3 tablets (975 mg total) by mouth every 8 (eight) hours   Patient taking differently: Take 650 mg by mouth 3 (three) times a day as needed for moderate pain    albuterol (PROVENTIL HFA,VENTOLIN HFA) 90 mcg/act inhaler  Family Member No No   Sig: Inhale 2 puffs every 4 (four) hours as needed for wheezing   apixaban (ELIQUIS) 5 mg   No No   Sig: Take 1 tablet (5 mg total) by mouth 2 (two) times a day cetirizine (ZyrTEC) 10 mg tablet  Family Member Yes No   Sig: Take 10 mg by mouth daily   cholecalciferol (VITAMIN D3) 1,000 units tablet  Family Member No No   Sig: Take 2 tablets (2,000 Units total) by mouth daily   fluticasone (FLONASE) 50 mcg/act nasal spray  Family Member Yes No   Si spray into each nostril daily   glucose blood test strip   No No   Sig: Use as instructed   insulin glargine (LANTUS) 100 units/mL subcutaneous injection   No No   Sig: Inject 17 Units under the skin daily at bedtime   lansoprazole (PREVACID) 30 mg capsule   No No   Sig: Take 1 capsule (30 mg total) by mouth daily   lidocaine (LMX) 4 % cream   No No   Sig: Apply topically 4 (four) times a day as needed for mild pain   lisinopril (ZESTRIL) 10 mg tablet   No No   Sig: Take 1 tablet (10 mg total) by mouth daily   magnesium oxide (MAG-OX) 400 mg   No No   Sig: Take 1 tablet (400 mg total) by mouth 2 (two) times a day   menthol-methyl salicylate (BENGAY) 70-69 % cream  Family Member No No   Sig: Apply topically 4 (four) times a day as needed (torticollis)   Patient not taking: Reported on 6/10/2021   multivitamin-minerals (CENTRUM ADULTS) tablet  Family Member No No   Sig: Take 1 tablet by mouth daily   naloxone (NARCAN) 4 mg/0 1 mL nasal spray  Family Member No No   Sig: Administer 1 spray into a nostril  If no response after 2-3 minutes, give another dose in the other nostril using a new spray     Patient not taking: Reported on 2021   pregabalin (LYRICA) 100 mg capsule   No No   Sig: Take 1 capsule (100 mg total) by mouth 3 (three) times a day   sitaGLIPtin-metFORMIN (JANUMET)  MG per tablet   No No   Sig: Take 1 tablet by mouth 2 (two) times a day with meals      Facility-Administered Medications: None       Past Medical History:   Diagnosis Date    Arthritis     [facility]     GERD (gastroesophageal reflux disease)     Hypercholesteremia     Hypertension     Ischemia of right lower extremity with suspected rhabdomyolysis 2/6/2021    Left Hydropneumothorax 2/10/2021    [facility]     Pneumonia due to COVID-19 virus 1/11/2021       Past Surgical History:   Procedure Laterality Date    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    Procedure: AMPUTATION ABOVE KNEE (AKA); Surgeon: Montez Bush MD;  [facility];  Service: Vascular    AMPUTATION ABOVE KNEE (AKA) Right 1/18/2021    Procedure: AMPUTATION ABOVE KNEE (AKA) FORMALIZATION,  R AKA wound washout, wound closure;  Surgeon: Montez Bush MD;  [facility];  Service: Vascular    ARTERIOGRAM Right 1/13/2021    Procedure: ARTERIOGRAM;  Surgeon: Radha Prescott DO;  [facility];  Service: Vascular    IR CHEST TUBE PLACEMENT  2/10/2021    IR LOWER EXTREMITY ANGIOGRAM  1/14/2021    THROMBECTOMY W/ EMBOLECTOMY Right 1/13/2021    Procedure: EMBOLECTOMY/THROMBECTOMY LOWER EXTREMITY;  Surgeon: Radha Prescott DO;  [facility];  Service: Vascular       Family History   Problem Relation Age of Onset    Diabetes Mother     Hypertension Father     Leukemia Father     Acute lymphoblastic leukemia Father     Cancer Sister      I have reviewed and agree with the history as documented  E-Cigarette/Vaping    E-Cigarette Use Never User      E-Cigarette/Vaping Substances    Nicotine No     THC No     CBD No     Flavoring No     Other No     Unknown No      Social History     Tobacco Use    Smoking status: Former Smoker    Smokeless tobacco: Never Used   Vaping Use    Vaping Use: Never used   Substance Use Topics    Alcohol use: Not Currently    Drug use: No     Comment: methadone       Review of Systems   Constitutional: Negative for chills and fever  Respiratory: Negative for shortness of breath  Cardiovascular: Negative for chest pain  Gastrointestinal: Negative for abdominal pain  Musculoskeletal: Positive for arthralgias  All other systems reviewed and are negative        Physical Exam  Physical Exam  Vitals reviewed  Constitutional:       Appearance: He is well-developed  HENT:      Head: Normocephalic and atraumatic  Mouth/Throat:      Mouth: Mucous membranes are moist    Eyes:      Conjunctiva/sclera: Conjunctivae normal    Cardiovascular:      Rate and Rhythm: Normal rate and regular rhythm  Heart sounds: Normal heart sounds  Pulmonary:      Effort: Pulmonary effort is normal       Breath sounds: Normal breath sounds  Abdominal:      General: Bowel sounds are normal       Palpations: Abdomen is soft  Tenderness: There is no abdominal tenderness  Musculoskeletal:         General: Normal range of motion  Cervical back: Normal range of motion  Comments: Right BKA noted  Tender to palpation anterior surface of distal right stump appearing overlying skin is normal call her  Non erythematous, increased warmth  No abscess or fluctuance  Skin:     General: Skin is warm and dry  Capillary Refill: Capillary refill takes less than 2 seconds  Neurological:      General: No focal deficit present  Mental Status: He is alert and oriented to person, place, and time           Vital Signs  ED Triage Vitals [09/13/21 1417]   Temperature Pulse Respirations Blood Pressure SpO2   98 6 °F (37 °C) 88 18 130/76 95 %      Temp Source Heart Rate Source Patient Position - Orthostatic VS BP Location FiO2 (%)   Tympanic Monitor Sitting Left arm --      Pain Score       7           Vitals:    09/13/21 1417   BP: 130/76   Pulse: 88   Patient Position - Orthostatic VS: Sitting         Visual Acuity      ED Medications  Medications   albuterol (PROVENTIL HFA,VENTOLIN HFA) inhaler 2 puff (has no administration in time range)   apixaban (ELIQUIS) tablet 5 mg (has no administration in time range)   cholecalciferol (VITAMIN D3) tablet 2,000 Units (has no administration in time range)   DULoxetine (CYMBALTA) delayed release capsule 30 mg (has no administration in time range) fluticasone (FLONASE) 50 mcg/act nasal spray 1 spray (has no administration in time range)   insulin glargine (LANTUS) subcutaneous injection 17 Units 0 17 mL (has no administration in time range)   pantoprazole (PROTONIX) EC tablet 40 mg (has no administration in time range)   lisinopril (ZESTRIL) tablet 10 mg (has no administration in time range)   magnesium oxide (MAG-OX) tablet 400 mg (has no administration in time range)   pregabalin (LYRICA) capsule 100 mg (has no administration in time range)   multi-electrolyte (PLASMALYTE-A/ISOLYTE-S PH 7 4) IV solution (has no administration in time range)   acetaminophen (TYLENOL) tablet 650 mg (has no administration in time range)   ondansetron (ZOFRAN) injection 4 mg (has no administration in time range)   cefepime (MAXIPIME) IVPB (premix in dextrose) 2,000 mg 50 mL (has no administration in time range)   insulin lispro (HumaLOG) 100 units/mL subcutaneous injection 1-5 Units (has no administration in time range)   insulin lispro (HumaLOG) 100 units/mL subcutaneous injection 1-5 Units (has no administration in time range)   vancomycin (VANCOCIN) IVPB (premix in dextrose) 1,000 mg 200 mL (has no administration in time range)       Diagnostic Studies  Results Reviewed     Procedure Component Value Units Date/Time    Basic metabolic panel [[id number]]  (Abnormal) Collected: 09/13/21 1424    Lab Status: Final result Specimen: Blood from Arm, Left Updated: 09/13/21 1512     Sodium 131 mmol/L      Potassium 4 8 mmol/L      Chloride 95 mmol/L      CO2 27 mmol/L      ANION GAP 9 mmol/L      BUN 33 mg/dL      Creatinine 1 01 mg/dL      Glucose 352 mg/dL      Calcium 9 6 mg/dL      eGFR 79 ml/min/1 73sq m     Narrative:      Laya guidelines for Chronic Kidney Disease (CKD):     Stage 1 with normal or high GFR (GFR > 90 mL/min/1 73 square meters)    Stage 2 Mild CKD (GFR = 60-89 mL/min/1 73 square meters)    Stage 3A Moderate CKD (GFR = 45-59 mL/min/1 73 square meters)    Stage 3B Moderate CKD (GFR = 30-44 mL/min/1 73 square meters)    Stage 4 Severe CKD (GFR = 15-29 mL/min/1 73 square meters)    Stage 5 End Stage CKD (GFR <15 mL/min/1 73 square meters)  Note: GFR calculation is accurate only with a steady state creatinine    Lactic acid [[id number]]  (Abnormal) Collected: 09/13/21 1424    Lab Status: Final result Specimen: Blood from Arm, Left Updated: 09/13/21 1511     LACTIC ACID 2 1 mmol/L     Narrative:      Result may be elevated if tourniquet was used during collection  Result may be elevated if tourniquet was used during collection  Lactic acid 2 Hours [[id number]]     Lab Status: No result Specimen: Blood     Sedimentation rate, automated [[id number]]  (Abnormal) Collected: 09/13/21 1424    Lab Status: Final result Specimen: Blood from Arm, Left Updated: 09/13/21 1511     Sed Rate 72 mm/hour     C-reactive protein [[id number]]  (Abnormal) Collected: 09/13/21 1424    Lab Status: Final result Specimen: Blood from Arm, Left Updated: 09/13/21 1509     CRP 14 9 mg/L     CBC and differential [[id number]]  (Abnormal) Collected: 09/13/21 1424    Lab Status: Final result Specimen: Blood from Arm, Left Updated: 09/13/21 1454     WBC 8 10 Thousand/uL      RBC 4 52 Million/uL      Hemoglobin 12 6 g/dL      Hematocrit 38 1 %      MCV 84 fL      MCH 27 8 pg      MCHC 33 0 g/dL      RDW 13 4 %      MPV 9 0 fL      Platelets 804 Thousands/uL      Neutrophils Relative 69 %      Lymphocytes Relative 21 %      Monocytes Relative 8 %      Eosinophils Relative 2 %      Basophils Relative 1 %      Neutrophils Absolute 5 60 Thousands/µL      Lymphocytes Absolute 1 70 Thousands/µL      Monocytes Absolute 0 60 Thousand/µL      Eosinophils Absolute 0 10 Thousand/µL      Basophils Absolute 0 10 Thousands/µL     Blood culture #2 [[id number]] Collected: 09/13/21 1424    Lab Status:  In process Specimen: Blood from Arm, Left Updated: 09/13/21 1448    Blood culture #1 [[id number]] Collected: 09/13/21 1429    Lab Status: In process Specimen: Blood from Hand, Right Updated: 09/13/21 1448                 MRI inpatient order    (Results Pending)              Procedures  Procedures         ED Course                             SBIRT 20yo+      Most Recent Value   SBIRT (25 yo +)   In order to provide better care to our patients, we are screening all of our patients for alcohol and drug use  Would it be okay to ask you these screening questions? Unable to answer at this time Filed at: 09/13/2021 1503                    MDM  Number of Diagnoses or Management Options  [facility]  Diagnosis management comments: Patient overall well-appearing however given concern for osteomyelitis and patient's generalized malaise, fatigue and chills, concern for osteomyelitis  Will admit with plan for MRI and orthopedic consultation  Patient agreeable plan  Disposition  Final diagnoses:   O[facility]omyelitis ([facility] )     Time reflects when diagnosis was documented in both MDM as applicable and the Disposition within this note     Time User Action Codes Description Comment    9/13/2021  3:52 PM Donya Watts [M86 [id number]] Osteomyelitis [facility])       ED Disposition     ED Disposition Condition Date/Time Comment    Admit Stable Mon Sep 13, 2021  3:52 PM Case was discussed with Dr Kaley Levine and the patient's admission status was agreed to be Admission Status: inpatient status to the service of Dr Kaley Levine  Follow-up Information    None         Patient's Medications   Discharge Prescriptions    No medications on file     No discharge procedures on file      PDMP Review       Value Time User    PDMP Reviewed  Yes 6/25/2021 12:29 PM Francisco Yarbrough MD          ED Provider  Electronically Signed by           Tori Acosta PA-C  09/13/21 3791

## 2021-09-13 NOTE — ASSESSMENT & PLAN NOTE
· Recent x-ray of right femur shows findings concerning for possible osteomyelitis  · Will check MRI  · Start patient on antibiotics for now cefepime/vancomycin with pharmacy consult for vanco dosing  · Orthopedic consult  · Pain control as needed  · CRP/ESR elevated

## 2021-09-13 NOTE — CONSULTS
Vancomycin Assessment    Victorino Rhoades is a 59 y o  male who is currently receiving vancomycin 1000 mg IV Q 12 Hrs for   bone/joint infection  Relevant clinical data and objective history reviewed:  Creatinine   Date Value Ref Range Status   09/13/2021 1 01 0 70 - 1 30 mg/dL Final     Comment:     Standardized to IDMS reference method   09/02/2021 1 05 0 60 - 1 30 mg/dL Final     Comment:     Standardized to IDMS reference method   06/15/2021 0 80 0 70 - 1 30 mg/dL Final     Comment:     Standardized to IDMS reference method     Vancomycin Rm   Date Value Ref Range Status   01/24/2021 22 6 ug/mL Final     /76 (BP Location: Left arm)   Pulse 88   Temp 98 6 °F (37 °C) (Tympanic)   Resp 18   Wt 68 kg (150 lb)   SpO2 95%   BMI 22 81 kg/m²   No intake/output data recorded  Lab Results   Component Value Date/Time    BUN 33 (H) 09/13/2021 02:24 PM    WBC 8 10 09/13/2021 02:24 PM    HGB 12 6 (L) 09/13/2021 02:24 PM    HCT 38 1 (L) 09/13/2021 02:24 PM    MCV 84 09/13/2021 02:24 PM     09/13/2021 02:24 PM     Temp Readings from Last 3 Encounters:   09/13/21 98 6 °F (37 °C) (Tympanic)   09/02/21 98 2 °F (36 8 °C)   08/20/21 98 4 °F (36 9 °C)     Vancomycin Days of Therapy: 1    Assessment/Plan  The patient is currently on vancomycin utilizing scheduled dosing based on actual body weight  Patient does have an above the knee amputation  Baseline risks associated with therapy include: concomitant nephrotoxic medications and dehydration  The patient is currently receiving 1000 mg IV Q 12 Hrs and is clinically appropriate and dose will be continued  Pharmacy will also follow closely for s/sx of nephrotoxicity, infusion reactions, and appropriateness of therapy  BMP and CBC will be ordered per protocol  Plan for trough as patient approaches steady state, prior to the 4th  dose at approximately 0500 on 9/15/21  Due to infection severity, will target a trough of 15-20 (appropriate for most indications)   Pharmacy will continue to follow the patients culture results and clinical progress daily      Zuri Rao, Pharmacist

## 2021-09-13 NOTE — ASSESSMENT & PLAN NOTE
· Will hold oral diabetic medications for now  · Insulin sliding scale  · Continue Lantus 17 units at bedtime  · Diabetic diet

## 2021-09-14 PROBLEM — E87.1 CHRONIC HYPONATREMIA: Status: ACTIVE | Noted: 2021-09-14

## 2021-09-14 LAB
ANION GAP SERPL CALCULATED.3IONS-SCNC: 7 MMOL/L (ref 4–13)
BASOPHILS # BLD AUTO: 0.1 THOUSANDS/ΜL (ref 0–0.1)
BASOPHILS NFR BLD AUTO: 1 % (ref 0–2)
BUN SERPL-MCNC: 28 MG/DL (ref 7–25)
CALCIUM SERPL-MCNC: 9.7 MG/DL (ref 8.6–10.5)
CHLORIDE SERPL-SCNC: 98 MMOL/L (ref 98–107)
CO2 SERPL-SCNC: 30 MMOL/L (ref 21–31)
CREAT SERPL-MCNC: 0.93 MG/DL (ref 0.7–1.3)
EOSINOPHIL # BLD AUTO: 0.2 THOUSAND/ΜL (ref 0–0.61)
EOSINOPHIL NFR BLD AUTO: 2 % (ref 0–5)
ERYTHROCYTE [DISTWIDTH] IN BLOOD BY AUTOMATED COUNT: 13.5 % (ref 11.5–14.5)
GFR SERPL CREATININE-BSD FRML MDRD: 87 ML/MIN/1.73SQ M
GLUCOSE SERPL-MCNC: 171 MG/DL (ref 65–99)
GLUCOSE SERPL-MCNC: 191 MG/DL (ref 65–140)
GLUCOSE SERPL-MCNC: 251 MG/DL (ref 65–140)
GLUCOSE SERPL-MCNC: 289 MG/DL (ref 65–140)
GLUCOSE SERPL-MCNC: 375 MG/DL (ref 65–140)
HCT VFR BLD AUTO: 37.3 % (ref 42–47)
HGB BLD-MCNC: 12.4 G/DL (ref 14–18)
LYMPHOCYTES # BLD AUTO: 1.5 THOUSANDS/ΜL (ref 0.6–4.47)
LYMPHOCYTES NFR BLD AUTO: 17 % (ref 21–51)
MAGNESIUM SERPL-MCNC: 1.9 MG/DL (ref 1.9–2.7)
MCH RBC QN AUTO: 27.7 PG (ref 26–34)
MCHC RBC AUTO-ENTMCNC: 33.2 G/DL (ref 31–37)
MCV RBC AUTO: 84 FL (ref 81–99)
MONOCYTES # BLD AUTO: 0.8 THOUSAND/ΜL (ref 0.17–1.22)
MONOCYTES NFR BLD AUTO: 10 % (ref 2–12)
NEUTROPHILS # BLD AUTO: 6.2 THOUSANDS/ΜL (ref 1.4–6.5)
NEUTS SEG NFR BLD AUTO: 71 % (ref 42–75)
PLATELET # BLD AUTO: 214 THOUSANDS/UL (ref 149–390)
PMV BLD AUTO: 9.2 FL (ref 8.6–11.7)
POTASSIUM SERPL-SCNC: 4.2 MMOL/L (ref 3.5–5.5)
RBC # BLD AUTO: 4.47 MILLION/UL (ref 4.3–5.9)
SODIUM SERPL-SCNC: 135 MMOL/L (ref 134–143)
WBC # BLD AUTO: 8.8 THOUSAND/UL (ref 4.8–10.8)

## 2021-09-14 PROCEDURE — 82948 REAGENT STRIP/BLOOD GLUCOSE: CPT

## 2021-09-14 PROCEDURE — 83735 ASSAY OF MAGNESIUM: CPT | Performed by: INTERNAL MEDICINE

## 2021-09-14 PROCEDURE — 99232 SBSQ HOSP IP/OBS MODERATE 35: CPT

## 2021-09-14 PROCEDURE — 80048 BASIC METABOLIC PNL TOTAL CA: CPT | Performed by: INTERNAL MEDICINE

## 2021-09-14 PROCEDURE — 97166 OT EVAL MOD COMPLEX 45 MIN: CPT

## 2021-09-14 PROCEDURE — 97163 PT EVAL HIGH COMPLEX 45 MIN: CPT

## 2021-09-14 PROCEDURE — 85025 COMPLETE CBC W/AUTO DIFF WBC: CPT | Performed by: INTERNAL MEDICINE

## 2021-09-14 RX ORDER — METHADONE HYDROCHLORIDE 10 MG/1
70 TABLET ORAL DAILY
COMMUNITY

## 2021-09-14 RX ORDER — INSULIN GLARGINE 100 [IU]/ML
20 INJECTION, SOLUTION SUBCUTANEOUS
Status: DISCONTINUED | OUTPATIENT
Start: 2021-09-14 | End: 2021-09-15 | Stop reason: HOSPADM

## 2021-09-14 RX ORDER — METHADONE HYDROCHLORIDE 10 MG/1
70 TABLET ORAL DAILY
Status: DISCONTINUED | OUTPATIENT
Start: 2021-09-14 | End: 2021-09-15 | Stop reason: HOSPADM

## 2021-09-14 RX ADMIN — DULOXETINE 30 MG: 30 CAPSULE, DELAYED RELEASE ORAL at 21:47

## 2021-09-14 RX ADMIN — PREGABALIN 100 MG: 100 CAPSULE ORAL at 21:47

## 2021-09-14 RX ADMIN — INSULIN LISPRO 2 UNITS: 100 INJECTION, SOLUTION INTRAVENOUS; SUBCUTANEOUS at 08:31

## 2021-09-14 RX ADMIN — CEFEPIME HYDROCHLORIDE 2000 MG: 2 INJECTION, SOLUTION INTRAVENOUS at 17:49

## 2021-09-14 RX ADMIN — FLUTICASONE PROPIONATE 1 SPRAY: 50 SPRAY, METERED NASAL at 08:31

## 2021-09-14 RX ADMIN — VANCOMYCIN HYDROCHLORIDE 1000 MG: 1 INJECTION, SOLUTION INTRAVENOUS at 19:00

## 2021-09-14 RX ADMIN — INSULIN LISPRO 1 UNITS: 100 INJECTION, SOLUTION INTRAVENOUS; SUBCUTANEOUS at 21:46

## 2021-09-14 RX ADMIN — INSULIN GLARGINE 20 UNITS: 100 INJECTION, SOLUTION SUBCUTANEOUS at 21:46

## 2021-09-14 RX ADMIN — PREGABALIN 100 MG: 100 CAPSULE ORAL at 08:31

## 2021-09-14 RX ADMIN — INSULIN LISPRO 4 UNITS: 100 INJECTION, SOLUTION INTRAVENOUS; SUBCUTANEOUS at 17:49

## 2021-09-14 RX ADMIN — LISINOPRIL 10 MG: 10 TABLET ORAL at 08:31

## 2021-09-14 RX ADMIN — PREGABALIN 100 MG: 100 CAPSULE ORAL at 17:49

## 2021-09-14 RX ADMIN — PANTOPRAZOLE SODIUM 40 MG: 40 TABLET, DELAYED RELEASE ORAL at 05:07

## 2021-09-14 RX ADMIN — METHADONE HYDROCHLORIDE 70 MG: 10 TABLET ORAL at 13:07

## 2021-09-14 RX ADMIN — MAGNESIUM OXIDE TAB 400 MG (241.3 MG ELEMENTAL MG) 400 MG: 400 (241.3 MG) TAB at 08:31

## 2021-09-14 RX ADMIN — VANCOMYCIN HYDROCHLORIDE 1000 MG: 1 INJECTION, SOLUTION INTRAVENOUS at 05:36

## 2021-09-14 RX ADMIN — APIXABAN 5 MG: 5 TABLET, FILM COATED ORAL at 17:49

## 2021-09-14 RX ADMIN — Medication 2000 UNITS: at 08:31

## 2021-09-14 RX ADMIN — INSULIN LISPRO 3 UNITS: 100 INJECTION, SOLUTION INTRAVENOUS; SUBCUTANEOUS at 12:22

## 2021-09-14 RX ADMIN — INSULIN LISPRO 4 UNITS: 100 INJECTION, SOLUTION INTRAVENOUS; SUBCUTANEOUS at 17:50

## 2021-09-14 RX ADMIN — ACETAMINOPHEN 650 MG: 325 TABLET ORAL at 05:06

## 2021-09-14 RX ADMIN — MAGNESIUM OXIDE TAB 400 MG (241.3 MG ELEMENTAL MG) 400 MG: 400 (241.3 MG) TAB at 17:49

## 2021-09-14 RX ADMIN — ACETAMINOPHEN 650 MG: 325 TABLET ORAL at 18:59

## 2021-09-14 RX ADMIN — CEFEPIME HYDROCHLORIDE 2000 MG: 2 INJECTION, SOLUTION INTRAVENOUS at 05:03

## 2021-09-14 RX ADMIN — APIXABAN 5 MG: 5 TABLET, FILM COATED ORAL at 08:31

## 2021-09-14 NOTE — PROGRESS NOTES
[facility]  Progress Note Nena Messer 1960, 59 y o  male MRN: [id number]  Unit/Bed#: -02 Encounter: [id number]  Primary Care Provider: VERONICA Garcia   Date and time admitted to hospital: 9/13/2021  2:11 PM    * Subacute osteomyelitis of right femur ([address] )  Assessment & Plan  · Recent x-ray of right femur shows findings concerning for possible osteomyelitis  · MRI : Loss of normal fatty marrow signal on T1-weighted sequences distal 2 0 cm of the stump with accompanying low signal intensity centrally on fluid sensitive sequences, suggesting chronic infection and necrosis   Peripheral high signal, most compatible with granulation tissue   Surrounding periosteal edema  Findings most compatible with chronic osteomyelitis of the distal stump  · Currently on cefepime and vancomycin (day#2)  · Pharmacology consulted on vancomycin dosage recommend 1000mg iv q12h, atient currently on vancomycin   · Orthopedic surgery consulted, as per Ortho patients amputation was done by vascular surgeon, therefore vascular surgery should be consulted with regards for need of surgery  · Vascular surgery was contacted, vascular requesting Infectious Disease consult to determine need for source control  · Infectious disease consult placed  · CRP/ESR elevated, repeat in a m  · Pain control as needed   · Continue to monitor CBC, BMP daily   · Continue  to monitor vitals for any signs of fever         S/P AKA (above knee amputation), right [facility])  Assessment & Plan    · Patient had above-the-knee amputation completed by vascular surgeon Dr Luis Eduardo Lopez  on 1/14/21 due to suspected rhabdomyolysis of lower right extremity  · Continue supportive care  · PT/OT eval  · Vascular surgery consulted  Type 2 diabetes mellitus, with long-term current use of insulin ([facility])  Assessment & Plan  · Will hold oral diabetic medications for now  · Patient recent blood sugars today were 289, 251, 256  Patient is currently uncontrolled on current diabetic regimen  Added insulin Humalog 4 units with meals, and increased insulin Lantus to 20 units at bedtime  · Continue insulin sliding scale  · Will continue to monitor blood sugars  · Diabetic diet      Chronic hyponatremia  Assessment & Plan  · Upon assessed upon admission patient had a sodium level of 131  Was treated with IVF and sodium level was monitored and redrawn in the AM  · Sodium had improved to 135  · Possible hyponatremia secondary to osteomyelitis? · Per reviewing patient's history and speaking with patient, it seems to have a chronic hyponatremia due to volume depletion  · Will continue to monitor sodium level    Vitamin D deficiency  Assessment & Plan  · Continue vitamin-D supplementation    Essential hypertension  Assessment & Plan  · Blood pressure currently stable  · Continue lisinopril        VTE Pharmacologic Prophylaxis:  4 Moderate Risk (Score 3-4) - Pharmacological DVT Prophylaxis Ordered: apixaban (Eliquis)  4    Patient Centered Rounds: I performed bedside rounds with nursing staff today  Discussions with Specialists or Other Care Team Provider:orthopedic surgery, vascular surgery     Education and Discussions with Family / Patient: Updated  (son) at bedside  Time Spent for Care: 45 minutes  More than 50% of total time spent on counseling and coordination of care as described above  Current Length of Stay: 1 day(s)  Current Patient Status: Inpatient   Certification Statement: The patient will continue to require additional inpatient hospital stay due to IV antibiotics for osteomyelitis  Discharge Plan: possible transfer, pending Infectious Disease and vascular surgery recommendation    Code Status: Level 1 - Full Code    Subjective:   Patient is seen and examined today at bedside  Patient patient reports swelling and tenderness to distal right stump    Patient denies any fever, chills, chest pain, or shortness of breath  Objective:     Vitals:   Temp (24hrs), Av 7 °F (36 5 °C), Min:97 2 °F (36 2 °C), Max:98 6 °F (37 °C)    Temp:  [97 2 °F (36 2 °C)-98 6 °F (37 °C)] 97 7 °F (36 5 °C)  HR:  [72-88] 88  Resp:  [16-18] 18  BP: (109-142)/(60-77) 109/62  SpO2:  [94 %-96 %] 96 %  Body mass index is 27 19 kg/m²  Input and Output Summary (last 24 hours): Intake/Output Summary (Last 24 hours) at 2021 1733  Last data filed at 2021 0900  Gross per 24 hour   Intake 670 ml   Output 1600 ml   Net -930 ml       Physical Exam:   Physical Exam  Constitutional:       General: He is not in acute distress  Appearance: He is not ill-appearing  HENT:      Head: Normocephalic and atraumatic  Cardiovascular:      Rate and Rhythm: Normal rate and regular rhythm  Pulmonary:      Effort: No respiratory distress  Breath sounds: Normal breath sounds  Abdominal:      General: There is no distension  Palpations: Abdomen is soft  Tenderness: There is no abdominal tenderness  Musculoskeletal:      Right upper leg: Swelling and tenderness present  Legs:       Right Lower Extremity: Right leg is amputated above knee  Skin:     General: Skin is warm and dry  Findings: Ecchymosis (right distal stump) present  Neurological:      Mental Status: He is alert and oriented to person, place, and time     Psychiatric:         Attention and Perception: Attention normal          Mood and Affect: Mood normal          Behavior: Behavior normal          Additional Data:     Labs:  Results from last 7 days   Lab Units 21  0459   WBC Thousand/uL 8 80   HEMOGLOBIN g/dL 12 4*   HEMATOCRIT % 37 3*   PLATELETS Thousands/uL 214   NEUTROS PCT % 71   LYMPHS PCT % 17*   MONOS PCT % 10   EOS PCT % 2     Results from last 7 days   Lab Units 21  0459   SODIUM mmol/L 135   POTASSIUM mmol/L 4 2   CHLORIDE mmol/L 98   CO2 mmol/L 30   BUN mg/dL 28*   CREATININE mg/dL 0 93   ANION GAP mmol/L 7   CALCIUM mg/dL 9  7   GLUCOSE RANDOM mg/dL 171*         Results from last 7 days   Lab Units 09/14/21  1630 09/14/21  1111 09/14/21  0637 09/13/21  2113   POC GLUCOSE mg/dl 375* 289* 251* 246*         Results from last 7 days   Lab Units 09/13/21  1731 09/13/21  1424   LACTIC ACID mmol/L 1 5 2 1*       Lines/Drains:  Invasive Devices     Peripheral Intravenous Line            Peripheral IV 09/13/21 Left Forearm 1 day                      Imaging: Reviewed radiology reports from this admission including: MRI     Recent Cultures (last 7 days):   Results from last 7 days   Lab Units 09/13/21  1429 09/13/21  1424   BLOOD CULTURE  Received in Microbiology Lab  Culture in Progress  Received in Microbiology Lab  Culture in Progress         Last 24 Hours Medication List:   Current Facility-Administered Medications   Medication Dose Route Frequency Provider Last Rate    acetaminophen  650 mg Oral Q6H PRN Cameron Castorena MD      albuterol  2 puff Inhalation Q4H PRN Cameron Castorena MD      apixaban  5 mg Oral BID Cameron Castorena MD      cefepime  2,000 mg Intravenous Q12H Cameron Castorena MD 2,000 mg (09/14/21 0503)    cholecalciferol  2,000 Units Oral Daily Cameron Castorena MD      DULoxetine  30 mg Oral HS Cameron Castorena MD      fluticasone  1 spray Nasal Daily Cameron Castorena MD      insulin glargine  20 Units Subcutaneous HS Geoffrey Major PA-C      insulin lispro  1-5 Units Subcutaneous TID Mg Jimenez MD      insulin lispro  1-5 Units Subcutaneous HS Cameron Castorena MD      insulin lispro  4 Units Subcutaneous TID With Meals Geoffrey Major PA-C      lisinopril  10 mg Oral Daily Cameron Castorena MD      magnesium oxide  400 mg Oral BID Cameron Castorena MD      methadone  70 mg Oral Daily [place], CRNP      ondansetron  4 mg Intravenous Q6H PRN Cameron Castorena MD      pantoprazole  40 mg Oral Early Morning Kris Trinidad MD      pregabalin  100 mg Oral TID Kris Trinidad MD      vancomycin  15 mg/kg Intravenous Q12H Kris Trinidad MD 1,000 mg (09/14/21 1764)        Today, Patient Was Seen By: Zachariah Ceballos PA-C    **Please Note: This note may have been constructed using a voice recognition system  **

## 2021-09-14 NOTE — ASSESSMENT & PLAN NOTE
· Recent x-ray of right femur shows findings concerning for possible osteomyelitis  · MRI : Loss of normal fatty marrow signal on T1-weighted sequences distal 2 0 cm of the stump with accompanying low signal intensity centrally on fluid sensitive sequences, suggesting chronic infection and necrosis   Peripheral high signal, most compatible with granulation tissue   Surrounding periosteal edema  Findings most compatible with chronic osteomyelitis of the distal stump  · Currently on cefepime and vancomycin (day#2)  · Pharmacology consulted on vancomycin dosage recommend 1000mg iv q12h, atient currently on vancomycin   · Orthopedic surgery consulted, as per Ortho patients amputation was done by vascular surgeon, therefore vascular surgery should be consulted with regards for need of surgery  · Vascular surgery was contacted, vascular requesting Infectious Disease consult to determine need for source control  · Infectious disease consult placed  · CRP/ESR elevated, repeat in a m    · Pain control as needed   · Continue to monitor CBC, BMP daily   · Continue  to monitor vitals for any signs of fever

## 2021-09-14 NOTE — NURSING NOTE
Received via w/c to rm 113-2  Rt bka hx and assisted with 2 nurses to bed  Awake and alert/oriented x4  R/a status   Lungs clear  No sob  Denies pain at present, but states he gets left phantom limb pain  Heart is regular  No left lower leg edema  Positive left pedal/b/l radial pulses  Call bell and nurse # given   bs 246

## 2021-09-14 NOTE — ASSESSMENT & PLAN NOTE
· Will hold oral diabetic medications for now  · Patient recent blood sugars today were 289, 251, 256  Patient is currently uncontrolled on current diabetic regimen  Added insulin Humalog 4 units with meals, and increased insulin Lantus to 20 units at bedtime     · Continue insulin sliding scale  · Will continue to monitor blood sugars  · Diabetic diet

## 2021-09-14 NOTE — ASSESSMENT & PLAN NOTE
· Patient had above-the-knee amputation completed by vascular surgeon Dr Patt Ware  on 1/14/21 due to suspected rhabdomyolysis of lower right extremity  · Continue supportive care  · PT/OT eval  · Vascular surgery consulted

## 2021-09-14 NOTE — PROGRESS NOTES
Vancomycin IV Pharmacy-to-Dose Consultation    Bjorn Dancer is a 59 y o  male who is currently receiving Vancomycin IV with management by the Pharmacy Consult service  Assessment/Plan:  The patient was reviewed  Renal function is stable and no signs or symptoms of nephrotoxicity and/or infusion reactions were documented in the chart  Based on todays assessment, continue current vancomycin (day # 2) dosing of 1000mg iv q12h, with a plan for trough to be drawn at 0500 on 9/15/21  We will continue to follow the patients culture results and clinical progress daily      Vernell Kawasaki, Pharmacist

## 2021-09-14 NOTE — PLAN OF CARE
Problem: PHYSICAL THERAPY ADULT  Goal: Performs mobility at highest level of function for planned discharge setting  See evaluation for individualized goals  Description: Treatment/Interventions: Functional transfer training, LE strengthening/ROM, Therapeutic exercise, Endurance training, Patient/family training, Equipment eval/education, Bed mobility, Gait training, Spoke to nursing, OT  Equipment Recommended: Ivan Vitale (pt  has own)       See flowsheet documentation for full assessment, interventions and recommendations  Note: Prognosis: Good  Problem List: Decreased strength, Decreased endurance, Impaired balance, Decreased mobility, Decreased coordination, Decreased skin integrity  Assessment: Pt is 59 y o  male seen for PT evaluation s/p admit to [facility] on 9/13/2021 w/ Subacute osteomyelitis of right femur ([facility] [address] )  PT consulted to assess pt's functional mobility and d/c needs  Order placed for PT eval and tx, w/ up w/ A order  Comorbidities affecting pt's physical performance at time of assessment include: weakness,subacute osteomyelitis of R femur, HTN, s/p AKA R, type 2 DM,vitamin D deficiency  PTA, pt was lives w/ family in 2 level house w/main floor s/u  Personal factors affecting pt at time of IE include: inability to ambulate household distances, unable to perform dynamic tasks in community, positive fall history, unable to perform physical activity and inability to perform ADLs  Please find objective findings from PT assessment regarding body systems outlined above with impairments and limitations including weakness, impaired balance, decreased endurance, impaired coordination, gait deviations, decreased activity tolerance, decreased functional mobility tolerance, fall risk and decreased skin integrity  From PT/mobility standpoint, recommendation at time of d/c would be home with home health rehabilitation pending progress in order to facilitate return to PLOF             PT Discharge Recommendation: Home with home health rehabilitation     PT - OK to Discharge: No    See flowsheet documentation for full assessment

## 2021-09-14 NOTE — ASSESSMENT & PLAN NOTE
· Upon assessed upon admission patient had a sodium level of 131  Was treated with IVF and sodium level was monitored and redrawn in the AM  · Sodium had improved to 135  · Possible hyponatremia secondary to osteomyelitis? · Per reviewing patient's history and speaking with patient, it seems to have a chronic hyponatremia due to volume depletion    · Will continue to monitor sodium level

## 2021-09-14 NOTE — PLAN OF CARE
Problem: OCCUPATIONAL THERAPY ADULT  Goal: Performs self-care activities at highest level of function for planned discharge setting  See evaluation for individualized goals  Description: Treatment Interventions: ADL retraining, Functional transfer training, UE strengthening/ROM, Endurance training, Patient/family training, Compensatory technique education, Activityengagement, Equipment evaluation/education          See flowsheet documentation for full assessment, interventions and recommendations  Note: Limitation: Decreased ADL status, Decreased UE strength, Decreased endurance, Decreased sensation, Decreased self-care trans, Decreased high-level ADLs  Prognosis: Good  Assessment: Patient is a 59 y o  male seen for OT evaluation s/p admit to [address] [place] [address]  on 9/13/2021 w/Subacute osteomyelitis of right femur ([facility] [address] )  Commorbidities affecting patient's functional performance at time of assessment include: HTN, s/p R AKA, DM2 and vitamin D deficiency  Orders placed for OT evaluation and treatment and up with assistance  Performed at least two patient identifiers during session including name and wristband  Prior to admission, Patient requires assistance with ADLs/IADLs, ambulatory with wheelchair and lives with family in a two story house with 1st floor set-up  Personal factors affecting patient at time of initial evaluation include: difficulty performing ADLs and difficulty performing IADLs  Upon evaluation, patient requires supervision assist for UB ADLs, minimal  assist for LB ADLs, transfers in room and bathroom with minimal  assist, with the use of Rolling Walker  Patient is oriented x 4  Occupational performance is affected by the following deficits: decreased muscle strength, dynamic sit/ stand balance deficit with poor standing tolerance time for self care and functional mobility, decreased activity tolerance, impaired sensation and delayed righting and equilibrium reactions   Patient to benefit from continued Occupational Therapy treatment while in the hospital to address deficits as defined above and maximize level of functional independence with ADLs and functional mobility  Occupational Performance areas to address include: bathing/ shower, dressing, toilet hygiene, transfer to all surfaces and functional ambulation  From OT standpoint, recommendation at time of d/c would be Home with home health rehabilitation         OT Discharge Recommendation: Home with home health rehabilitation  OT - OK to Discharge: Yes (Once medically cleared )     Brain Portillo, OT

## 2021-09-14 NOTE — PLAN OF CARE
Problem: Potential for Falls  Goal: Patient will remain free of falls  Description: INTERVENTIONS:  - Educate patient/family on patient safety including physical limitations  - Instruct patient to call for assistance with activity   - Consult OT/PT to assist with strengthening/mobility   - Keep Call bell within reach  - Keep bed low and locked with side rails adjusted as appropriate  - Keep care items and personal belongings within reach  - Initiate and maintain comfort rounds  - Make Fall Risk Sign visible to staff  - Offer Toileting ever 4  Hours, in advance of need  - Initiate/Maintain  BED alarm  - Obtain necessary fall risk management equipme  - Apply yellow socks and bracelet for high fall risk patients  - Consider moving patient to room near nurses station  9/14/2021 0612 by Gal Robbins, RN  Outcome: Progressing  9/13/2021 2126 by Gal Robbins, RN  Outcome: Progressing     Problem: Nutrition/Hydration-ADULT  Goal: Nutrient/Hydration intake appropriate for improving, restoring or maintaining nutritional needs  Description: Monitor and assess patient's nutrition/hydration status for malnutrition  Collaborate with interdisciplinary team and initiate plan and interventions as ordered  Monitor patient's weight and dietary intake as ordered or per policy  Utilize nutrition screening tool and intervene as necessary  Determine patient's food preferences and provide high-protein, high-caloric foods as appropriate       INTERVENTIONS:  - Monitor oral intake, urinary output, labs, and treatment plans  - Assess nutrition and hydration status and recommend course of action  - Evaluate amount of meals eaten  - Assist patient with eating if necessary   - Allow adequate time for meals  - Recommend/ encourage appropriate diets, oral nutritional supplements, and vitamin/mineral supplements  - Order, calculate, and assess calorie counts as needed  - Recommend, monitor, and adjust tube feedings and TPN/PPN based on assessed needs  - Assess need for intravenous fluids  - Provide specific nutrition/hydration education as appropriate  - Include patient/family/caregiver in decisions related to nutrition  9/14/2021 0612 by Jarred Ndiaye RN  Outcome: Progressing  9/13/2021 2126 by Jarred Ndiaye RN  Outcome: Progressing     Problem: PAIN - ADULT  Goal: Verbalizes/displays adequate comfort level or baseline comfort level  Description: Interventions:  - Encourage patient to monitor pain and request assistance  - Assess pain using appropriate pain scale  - Administer analgesics based on type and severity of pain and evaluate response  - Implement non-pharmacological measures as appropriate and evaluate response  - Consider cultural and social influences on pain and pain management  - Notify physician/advanced practitioner if interventions unsuccessful or patient reports new pain  9/14/2021 0612 by Jarred Ndiaye RN  Outcome: Progressing  9/13/2021 2126 by Jarred Ndiaye RN  Outcome: Progressing     Problem: INFECTION - ADULT  Goal: Absence or prevention of progression during hospitalization  Description: INTERVENTIONS:  - Assess and monitor for signs and symptoms of infection  - Monitor lab/diagnostic results  - Monitor all insertion sites, i e  indwelling lines, tubes, and drains  - Monitor endotracheal if appropriate and nasal secretions for changes in amount and color  - [place] appropriate cooling/warming therapies per order  - Administer medications as ordered  - Instruct and encourage patient and family to use good hand hygiene technique  - Identify and instruct in appropriate isolation precautions for identified infection/condition  9/14/2021 0612 by Jarred Ndiaye RN  Outcome: Progressing  9/13/2021 2126 by Jarred Ndiaye RN  Outcome: Progressing  Goal: Absence of fever/infection during neutropenic period  Description: INTERVENTIONS:  - Monitor WBC    9/14/2021 0612 by Jarred Ndiaye RN  Outcome: Progressing  9/13/2021 2126 by Leon Espinal RN  Outcome: Progressing     Problem: SAFETY ADULT  Goal: Patient will remain free of falls  Description: INTERVENTIONS:  - Educate patient/family on patient safety including physical limitations  - Instruct patient to call for assistance with activity   - Consult OT/PT to assist with strengthening/mobility   - Keep Call bell within reach  - Keep bed low and locked with side rails adjusted as appropriate  - Keep care items and personal belongings within reach  - Initiate and maintain comfort rounds  - Make Fall Risk Sign visible to staff  - Offer Toileting every  4 Hours, in advance of need  - Initiate/Maintain  BED alarm  - Obtain necessary fall risk management equipment:   - Apply yellow socks and bracelet for high fall risk patients  - Consider moving patient to room near nurses station  9/14/2021 0612 by Leon Espinal RN  Outcome: Progressing  9/13/2021 2126 by Leon Espinal RN  Outcome: Progressing  Goal: Maintain or return to baseline ADL function  Description: INTERVENTIONS:  -  Assess patient's ability to carry out ADLs; assess patient's baseline for ADL function and identify physical deficits which impact ability to perform ADLs (bathing, care of mouth/teeth, toileting, grooming, dressing, etc )  - Assess/evaluate cause of self-care deficits   - Assess range of motion  - Assess patient's mobility; develop plan if impaired  - Assess patient's need for assistive devices and provide as appropriate  - Encourage maximum independence but intervene and supervise when necessary  - Involve family in performance of ADLs  - Assess for home care needs following discharge   - Consider OT consult to assist with ADL evaluation and planning for discharge  - Provide patient education as appropriate  9/14/2021 0612 by Leon Espinal RN  Outcome: Progressing  9/13/2021 2126 by Leon Espinal RN  Outcome: Progressing  Goal: Maintains/Returns to pre admission functional level  Description: INTERVENTIONS:  - Perform BMAT or MOVE assessment daily    - Set and communicate daily mobility goal to care team and patient/family/caregiver  - Collaborate with rehabilitation services on mobility goals if consulted  -  - Reposition patient every  2 hours  - Dangle patient 2  times a day  - Stand patient 2 times a day  - Out of bed to chair 2  times a day   - Out of bed for meals 2 times a day  - Out of bed for toileting  - Record patient progress and toleration of activity level   9/14/2021 0612 by Travis Winters RN  Outcome: Progressing  9/13/2021 2126 by Travis Winters RN  Outcome: Progressing     Problem: DISCHARGE PLANNING  Goal: Discharge to home or other facility with appropriate resources  Description: INTERVENTIONS:  - Identify barriers to discharge w/patient and caregiver  - Arrange for needed discharge resources and transportation as appropriate  - Identify discharge learning needs (meds, wound care, etc )  - Arrange for interpretive services to assist at discharge as needed  - Refer to Case Management Department for coordinating discharge planning if the patient needs post-hospital services based on physician/advanced practitioner order or complex needs related to functional status, cognitive ability, or social support system  9/14/2021 0612 by Travis Winters RN  Outcome: Progressing  9/13/2021 2126 by Travis Winters RN  Outcome: Progressing     Problem: Knowledge Deficit  Goal: Patient/family/caregiver demonstrates understanding of disease process, treatment plan, medications, and discharge instructions  Description: Complete learning assessment and assess knowledge base    Interventions:  - Provide teaching at level of understanding  - Provide teaching via preferred learning methods  9/14/2021 0612 by Travis Winters RN  Outcome: Progressing  9/13/2021 2126 by Travis Winters RN  Outcome: Progressing     Problem: METABOLIC, FLUID AND ELECTROLYTES - ADULT  Goal: Electrolytes maintained within normal limits  Description: INTERVENTIONS:  - Monitor labs and assess patient for signs and symptoms of electrolyte imbalances  - Administer electrolyte replacement as ordered  - Monitor response to electrolyte replacements, including repeat lab results as appropriate  - Instruct patient on fluid and nutrition as appropriate  9/14/2021 0612 by Gene Izquierdo RN  Outcome: Progressing  9/13/2021 2126 by Gene Izquierdo RN  Outcome: Progressing  Goal: Fluid balance maintained  Description: INTERVENTIONS:  - Monitor labs   - Monitor I/O and WT  - Instruct patient on fluid and nutrition as appropriate  - Assess for signs & symptoms of volume excess or deficit  9/14/2021 0612 by Gene Izquierdo RN  Outcome: Progressing  9/13/2021 2126 by Gene Izquierdo RN  Outcome: Progressing  Goal: Glucose maintained within target range  Description: INTERVENTIONS:  - Monitor Blood Glucose as ordered  - Assess for signs and symptoms of hyperglycemia and hypoglycemia  - Administer ordered medications to maintain glucose within target range  - Assess nutritional intake and initiate nutrition service referral as needed  9/14/2021 0612 by Gene Izquierdo RN  Outcome: Progressing  9/13/2021 2126 by Gene Izquierdo RN  Outcome: Progressing     Problem: HEMATOLOGIC - ADULT  Goal: Maintains hematologic stability  Description: INTERVENTIONS  - Assess for signs and symptoms of bleeding or hemorrhage  - Monitor labs  - Administer supportive blood products/factors as ordered and appropriate  9/14/2021 0612 by Gene Izquierdo RN  Outcome: Progressing  9/13/2021 2126 by Gene Izquierdo RN  Outcome: Progressing     Problem: MUSCULOSKELETAL - ADULT  Goal: Maintain or return mobility to safest level of function  Description: INTERVENTIONS:  - Assess patient's ability to carry out ADLs; assess patient's baseline for ADL function and identify physical deficits which impact ability to perform ADLs (bathing, care of mouth/teeth, toileting, grooming, dressing, etc )  - Assess/evaluate cause of self-care deficits   - Assess range of motion  - Assess patient's mobility  - Assess patient's need for assistive devices and provide as appropriate  - Encourage maximum independence but intervene and supervise when necessary  - Involve family in performance of ADLs  - Assess for home care needs following discharge   - Consider OT consult to assist with ADL evaluation and planning for discharge  - Provide patient education as appropriate  9/14/2021 0612 by Fredis Welch RN  Outcome: Progressing  9/13/2021 2126 by Fredis Welch RN  Outcome: Progressing  Goal: Maintain proper alignment of affected body part  Description: INTERVENTIONS:  - Support, maintain and protect limb and body alignment  - Provide patient/ family with appropriate education  9/14/2021 0612 by Fredis Welch RN  Outcome: Progressing  9/13/2021 2126 by Fredis Welch RN  Outcome: Progressing

## 2021-09-14 NOTE — OCCUPATIONAL THERAPY NOTE
Occupational Therapy Evaluation      Nava Tejedajessa    9/14/2021    Patient Active Problem List   Diagnosis    Chronic hepatitis C without hepatic coma ([place] [address] )    Essential hypertension    Hyperlipidemia associated with type 2 diabetes mellitus ([place] [address] )    Methadone maintenance therapy patient ([place] [address] )    Type 2 diabetes mellitus with diabetic polyneuropathy ([place] [address] )    Callous ulcer, limited to breakdown of skin ([place] [address] )    Bilateral lower extremity edema    Positive depression screening    Mononeuropathy, unspecified    Mixed hyperlipidemia    Proteinuria    Vitamin D deficiency    Type 2 diabetes mellitus, with long-term current use of insulin (HCC)    Acute respiratory failure with hypoxia (HCC)    Hematuria    Aortic thrombus (HCC)    Prolonged Q-T interval on ECG    Empyema lung (HCC)    S/P AKA (above knee amputation), right (HCC)    Cervical radiculopathy    Chronic bilateral low back pain    Above-knee amputation of right lower extremity (HCC)    Drug-induced constipation    Moderate protein-calorie malnutrition (HCC)    Chronic pain syndrome    Subacute osteomyelitis of right femur (HCC)       Past Medical History:   Diagnosis Date    Arthritis     Diabetes mellitus ([place] [address] )     GERD (gastroesophageal reflux disease)     Hypercholesteremia     Hypertension     Ischemia of right lower extremity with suspected rhabdomyolysis 2/6/2021    Left Hydropneumothorax 2/10/2021    Methadone use ([place] [address] )     Pneumonia due to COVID-19 virus 1/11/2021       Past Surgical History:   Procedure Laterality Date    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    Procedure: AMPUTATION ABOVE KNEE (AKA);   Surgeon: Vibha Turner MD;  [facility];  Service: Vascular    AMPUTATION ABOVE KNEE (AKA) Right 1/18/2021    Procedure: AMPUTATION ABOVE KNEE (AKA) FORMALIZATION,  R AKA wound washout, wound closure;  Surgeon: Vibha Turner MD;  [facility];  Service: Vascular    ARTERIOGRAM Right 1/13/2021    Procedure: ARTERIOGRAM;  Surgeon: Lisandro Akhtar DO;  [facility];  Service: Vascular    IR CHEST TUBE PLACEMENT  2/10/2021    IR LOWER EXTREMITY ANGIOGRAM  1/14/2021    THROMBECTOMY W/ EMBOLECTOMY Right 1/13/2021    Procedure: EMBOLECTOMY/THROMBECTOMY LOWER EXTREMITY;  Surgeon: Lisandro Akhtar DO;  [facility];  Service: Vascular        09/14/21 0933   OT Last Visit   OT Visit Date 09/14/21   Note Type   Note type Evaluation   Restrictions/Precautions   Weight Bearing Precautions Per Order Yes   RLE Weight Bearing Per Order NWB   Braces or Orthoses MAFO  (MAFO for LLE- donned prior to mobility )   Other Precautions WBS; Fall Risk   Pain Assessment   Pain Assessment Tool Pain Assessment not indicated - pt denies pain   Pain Score No Pain   Home Living   Type of Home House   Home Layout Two level;Performs ADLs on one level; Able to live on main level with bedroom/bathroom; Ramped entrance   Bathroom Shower/Tub   (Sponge bathes at baseline )   Bathroom Toilet   ([facility])   Bathroom Equipment Commode   Bathroom Accessibility Accessible via wheelchair   [address]; Hospital bed   Additional Comments Pt reports that he stand-pivots to from surfaces at baseline  Prior Function   Level of [place] Needs assistance with ADLs and functional mobility; Needs assistance with IADLs   Lives With Family  (Nephew )   Receives Help From Family   ADL Assistance Needs assistance  (requires assistance with toileting and LB bathing )   IADLs Needs assistance  (family does cooking and laundry )   Falls in the last 6 months 1 to 4  (1 fall off of bed reported )   Vocational On disability   Lifestyle   Autonomy Patient requires assistance with ADLs/IADLs, ambulatory with wheelchair and lives with family in a two story house with 1st floor set-up   Reciprocal Relationships nephew    Service to Others on disability   Intrinsic Gratification enjoys watching sports ADL   Eating Assistance 6  Modified independent   Grooming Assistance 6  Modified Independent   UB Bathing Assistance 5  Supervision/Setup   LB Bathing Assistance 4  Minimal Assistance   UB Dressing Assistance 5  Supervision/Setup   LB Dressing Assistance 4  Minimal [address]  4  Minimal Assistance   Bed Mobility   Supine to Sit 5  Supervision   Additional items Assist x 1;HOB elevated; Bedrails; Increased time required;Verbal cues   Sit to Supine   (DNT: pt seated OOB in wheelchair at end of eval )   Transfers   Sit to Stand 4  Minimal assistance   Additional items Assist x 1; Increased time required;Verbal cues   Stand to Sit 4  Minimal assistance   Additional items Assist x 1; Armrests; Increased time required;Verbal cues   Stand pivot 4  Minimal assistance   Additional items Assist x 1; Increased time required;Verbal cues  (utilized RW )   Balance   Static Sitting Good   Dynamic Sitting Fair +   Static Standing Fair   Dynamic Standing Fair -   Activity Tolerance   Activity Tolerance Patient limited by fatigue   Medical Staff Made Aware Pt seen as a co-eval with PT due to the patient's co-morbidities, clinically unstable presentation, and present impairments which are a regression from the patient's baseline  Nurse Made Aware RN made aware of outcomes    RUE Assessment   RUE Assessment WFL   LUE Assessment   LUE Assessment WFL   Hand Function   Gross Motor Coordination Functional   Fine Motor Coordination Functional   Sensation   Light Touch Severe deficits in the RUE;Partial deficits in the LLE;Partial deficits in the LUE  (numbness in R arm and occasionally in L foot/hand )   Vision-Basic Assessment   Current Vision Does not wear glasses   Patient Visual Report   (pt reports the vision is worsening (blurry) )   Cognition   Overall Cognitive Status [facility]   Arousal/Participation Alert; Responsive; Cooperative   Attention Within functional limits   Orientation Level Oriented X4   Memory Within functional limits   Following Commands Follows all commands and directions without difficulty   Comments Pt agreeable to OT eval    Assessment   Limitation Decreased ADL status; Decreased UE strength;Decreased endurance;Decreased sensation;Decreased self-care trans;Decreased high-level ADLs   Prognosis Good   Assessment Patient is a 59 y o  male seen for OT evaluation s/p admit to [address] [place] [address]  on 9/13/2021 w/Subacute osteomyelitis of right femur ([facility])  Commorbidities affecting patient's functional performance at time of assessment include: HTN, s/p R AKA, DM2 and vitamin D deficiency  Orders placed for OT evaluation and treatment and up with assistance  Performed at least two patient identifiers during session including name and wristband  Prior to admission, Patient requires assistance with ADLs/IADLs, ambulatory with wheelchair and lives with family in a two story house with 1st floor set-up  Personal factors affecting patient at time of initial evaluation include: difficulty performing ADLs and difficulty performing IADLs  Upon evaluation, patient requires supervision assist for UB ADLs, minimal  assist for LB ADLs, transfers in room and bathroom with minimal  assist, with the use of Rolling Walker  Patient is oriented x 4  Occupational performance is affected by the following deficits: decreased muscle strength, dynamic sit/ stand balance deficit with poor standing tolerance time for self care and functional mobility, decreased activity tolerance, impaired sensation and delayed righting and equilibrium reactions  Patient to benefit from continued Occupational Therapy treatment while in the hospital to address deficits as defined above and maximize level of functional independence with ADLs and functional mobility  Occupational Performance areas to address include: bathing/ shower, dressing, toilet hygiene, transfer to all surfaces and functional ambulation   From OT standpoint, recommendation at time of d/c would be Home with home health rehabilitation  Goals   Patient Goals to get stronger    Plan   Treatment Interventions ADL retraining;Functional transfer training;UE strengthening/ROM; Endurance training;Patient/family training; Compensatory technique education; Activityengagement;Equipment evaluation/education   Goal Expiration Date 09/24/21   OT Treatment Day 0   OT Frequency 3-5x/wk   Recommendation   OT Discharge Recommendation Home with home health rehabilitation   OT - OK to Discharge Yes  (Once medically cleared )   Additional Comments  At end of eval, pt seated OOB in wheelchair with all needs met and call bell within reach    Additional Comments 2 The patient's raw score on the AM-PAC Daily Activity inpatient short form is 21, standardized score is 44 27, greater than 39 4  Patients at this level are likely to benefit from discharge to home  Please refer to the recommendation of the Occupational Therapist for safe discharge planning     AM-PAC Daily Activity Inpatient   Lower Body Dressing 3   Bathing 3   Toileting 3   Upper Body Dressing 4   Grooming 4   Eating 4   Daily Activity Raw Score 21   Daily Activity Standardized Score (Calc for Raw Score >=11) 44 27   AM-[facility] Applied Cognition Inpatient   Following a Speech/Presentation 4   Understanding Ordinary Conversation 4   Taking Medications 4   Remembering Where Things Are Placed or Put Away 4   Remembering List of 4-5 Errands 4   Taking Care of Complicated Tasks 4   Applied Cognition Raw Score 24   Applied Cognition Standardized Score 62 21     GOALS:    *ADL transfers with (I) for inc'd independence with ADLs/purposeful tasks    *UB ADL with (I) for inc'd independence with self cares    *LB ADL with (I) using AE prn for inc'd independence with self cares    *Toileting with (I) for clothing management and hygiene for return to PLOF with personal care    *Increase stand tolerance x5 m for inc'd tolerance with standing purposeful tasks    *Participate in 10m UE therex to increase overall stamina/activity tolerance for purposeful tasks    *Bed mobility- (I) for inc'd independence to manage own comfort and initiate EOB & OOB purposeful tasks    *Patient will verbalize 3 safety awareness/ principles to prevent falls in the home setting  *Patient will increase OOB/sitting tolerance to 2-4 hours per day to increase participation in self-care and leisure tasks with no s/s of exertion       Alessio Madrid MS, OTR/L

## 2021-09-14 NOTE — PHYSICAL THERAPY NOTE
Physical Therapy Evaluation     Patient's Name: Devi Patel    Admitting Diagnosis  Osteomyelitis ([place] [address] ) [M86 9]    Problem List  Patient Active Problem List   Diagnosis    Chronic hepatitis C without hepatic coma ([place] [address] )    Essential hypertension    Hyperlipidemia associated with type 2 diabetes mellitus ([place] [address] )    Methadone maintenance therapy patient ([place] [address] )    Type 2 diabetes mellitus with diabetic polyneuropathy ([place] [address] )    Callous ulcer, limited to breakdown of skin ([place] [address] )    Bilateral lower extremity edema    Positive depression screening    Mononeuropathy, unspecified    Mixed hyperlipidemia    Proteinuria    Vitamin D deficiency    Type 2 diabetes mellitus, with long-term current use of insulin (HCC)    Acute respiratory failure with hypoxia (HCC)    Hematuria    Aortic thrombus (HCC)    Prolonged Q-T interval on ECG    Empyema lung (HCC)    S/P AKA (above knee amputation), right (HCC)    Cervical radiculopathy    Chronic bilateral low back pain    Above-knee amputation of right lower extremity (HCC)    Drug-induced constipation    Moderate protein-calorie malnutrition (HCC)    Chronic pain syndrome    Subacute osteomyelitis of right femur ([facility])       Past Medical History  Past Medical History:   Diagnosis Date    Arthritis     Diabetes mellitus ([place] [address] )     GERD (gastroesophageal reflux disease)     Hypercholesteremia     Hypertension     Ischemia of right lower extremity with suspected rhabdomyolysis 2/6/2021    Left Hydropneumothorax 2/10/2021    Methadone use ([place] [address] )     Pneumonia due to COVID-19 virus 1/11/2021       Past Surgical History  Past Surgical History:   Procedure Laterality Date    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    Procedure: AMPUTATION ABOVE KNEE (AKA);   Surgeon: Claus Delacruz MD;  [facility];  Service: Vascular    AMPUTATION ABOVE KNEE (AKA) Right 1/18/2021    Procedure: AMPUTATION ABOVE KNEE (AKA) FORMALIZATION,  R AKA wound washout, wound closure;  Surgeon: Todd Arndt MD;  [facility];  Service: Vascular    ARTERIOGRAM Right 1/13/2021    Procedure: ARTERIOGRAM;  Surgeon: Misael Padilla DO;  [facility];  Service: Vascular    IR CHEST TUBE PLACEMENT  2/10/2021    IR LOWER EXTREMITY ANGIOGRAM  1/14/2021    THROMBECTOMY W/ EMBOLECTOMY Right 1/13/2021    Procedure: EMBOLECTOMY/THROMBECTOMY LOWER EXTREMITY;  Surgeon: Misael Padilla DO;  [facility];  Service: Vascular        09/14/21 0917   PT Last Visit   PT Visit Date 09/14/21   Note Type   Note type Evaluation   Pain Assessment   Pain Assessment Tool Pain Assessment not indicated - pt denies pain   Pain Score No Pain   Home Living   Type of [address] Two level;Performs ADLs on one level; Able to live on main level with bedroom/bathroom; Access; Ramped entrance   Bathroom Shower/Tub   (baseline sponge bathing)   Bathroom Toilet   ([address] usage)   Bathroom Equipment Commode   Bathroom Accessibility Accessible via wheelchair   [address]; Hospital bed   Additional Comments PTA, pt  reports he stand->pivots OOB to w/c  Prior Function   Level of [employer] Independent with ADLs and functional mobility; Needs assistance with IADLs   Lives With Family  (nephew)   Receives Help From Family   ADL Assistance Needs assistance   IADLs Needs assistance   Falls in the last 6 months 1 to 4  (x 1)   Vocational On disability   Restrictions/Precautions   Weight Bearing Precautions Per Order Yes   RLE Weight Bearing Per Order NWB   Braces or Orthoses MAFO  (LLE, reports hx of drop foot)   Other Precautions Bed Alarm; Fall Risk;WBS  (decreased skin integrity)   General   Additional Pertinent History Pt  reports his HHPT was recently discontinued     Family/Caregiver Present No   Cognition   Overall Cognitive Status WFL   Arousal/Participation Alert   Attention Within functional limits   Orientation Level Oriented X4   Memory Within functional limits   Following Commands Follows all commands and directions without difficulty   Comments Pt  agreeable to PT assessment, very pleasant  RLE Assessment   RLE Assessment X  (3+/5 gross musculature)   LLE Assessment   LLE Assessment X  (4-/5 gross musculature)   Coordination   Movements are Fluid and Coordinated 0   Bed Mobility   Supine to Sit 5  Supervision   Additional items Assist x 1;HOB elevated; Bedrails; Increased time required;Verbal cues   Sit to Supine   (DNT as pt  was sitting OOB on his own w/c upon conclusion )   Additional Comments Pt  denied lightheadedness/dizziness with positional changes  Transfers   Sit to Stand 4  Minimal assistance   Additional items Assist x 1;Bedrails; Increased time required;Verbal cues   Stand to Sit 4  Minimal assistance   Additional items Assist x 1;Bedrails; Increased time required;Verbal cues   Stand pivot 4  Minimal assistance   Additional items Assist x 1; Increased time required;Verbal cues   Ambulation/Elevation   Gait pattern Improper Weight shift; Forward Flexion; Short stride  (hopping pattern)   Gait Assistance 4  Minimal assist   Additional items Assist x 1;Verbal cues; Tactile cues   Assistive Device Rolling walker   Distance 1 foot  (OOB->w/c)   Stair Management Assistance Not tested  (N/A)   Balance   Static Sitting Good   Dynamic Sitting Fair +   Static Standing Fair   Dynamic Standing Fair -   Ambulatory Fair -   Endurance Deficit   Endurance Deficit Yes   Activity Tolerance   Activity Tolerance Patient limited by fatigue   Medical Staff Made Aware Yes, Lakeisha Lay OT present for co-assessment due to medical complexity, initial trialing of OOB tasks  Nurse Made Aware yes, Marti Sanchez PCA & Lakesha Mask RN   Assessment   Prognosis Good   Problem List Decreased strength;Decreased endurance; Impaired balance;Decreased mobility; Decreased coordination;Decreased skin integrity   Assessment Pt is 59 y o  male seen for PT evaluation s/p admit to [address] [place] [address] on 9/13/2021 w/ Subacute osteomyelitis of right femur ([address] )  PT consulted to assess pt's functional mobility and d/c needs  Order placed for PT eval and tx, w/ up w/ A order  Comorbidities affecting pt's physical performance at time of assessment include: weakness,subacute osteomyelitis of R femur, HTN, s/p AKA R, type 2 DM,vitamin D deficiency  PTA, pt was lives w/ family in 2 level house w/main floor s/u  Personal factors affecting pt at time of IE include: inability to ambulate household distances, unable to perform dynamic tasks in community, positive fall history, unable to perform physical activity and inability to perform ADLs  Please find objective findings from PT assessment regarding body systems outlined above with impairments and limitations including weakness, impaired balance, decreased endurance, impaired coordination, gait deviations, decreased activity tolerance, decreased functional mobility tolerance, fall risk and decreased skin integrity  From PT/mobility standpoint, recommendation at time of d/c would be home with home health rehabilitation pending progress in order to facilitate return to PLOF  Goals   Patient Goals to get stronger    LTG Expiration Date 09/24/21   Long Term Goal #1 Patient will complete transfers modified I  o decrease risk of falls, facilitate upright standing posture  BLE strength to greater than/equal to 4/5 gross musculature to increase ability to safely transfer, control descent to chair  Patient will exhibit increase dynamic standing balance to Fair > 2 minutes without LOB supervision of 1 to improve endurance  Patient will exhibit increase dynamic ambulatory balance to Fair+ > 5 feet w/ AD supervision of 1 to improve ability to mobilize to toilet, chair and decrease risk for additional medical complications  PT Treatment Day 0   Plan   Treatment/Interventions Functional transfer training;LE strengthening/ROM; Therapeutic exercise; Endurance training;Patient/family training;Equipment eval/education; Bed mobility;Gait training;Spoke to nursing;OT   PT Frequency Other (Comment)  (3-5x/wk)   Recommendation   PT Discharge Recommendation Home with home health rehabilitation   Equipment Recommended Claudia Redder  (pt  has own)   PT - OK to Discharge No   Additional Comments Upon conclusion, pt  was sitting OOB on own w/c with all needs within reach  Additional Comments 2 Pt's raw score on the AM-[facility] Basic Mobility inpatient short form is 18, standardized score is 41 05  Patients at this level are likely to benefit from DC to [address]  However, please refer to therapist recommendation for safe DC planning     AM-P[facility] Basic Mobility Inpatient   Turning in Bed Without Bedrails 3   Lying on Back to Sitting on Edge of Flat Bed 3   Moving Bed to Chair 3   Standing Up From Chair 3   Walk in Room 3   Climb 3-5 Stairs 3   Basic Mobility Inpatient Raw Score 18   Basic Mobility Standardized Score 41 05     History/Personal Factors/Comorbidities: cervical radiculopathy, chronic pain syndrome, weakness,subacute osteomyelitis of R femur, HTN, s/p AKA R, type 2 DM,vitamin D deficiency    # of body structures/limitations: muscle weakness, activity intolerance,decreased endurance, impaired balance, gait deviations    Clinical presentation: unstable as seen in decreased skin integrity, abnormal lab values, fall hx,WBS RLE    Initial Assessment Time: 8420-2047    Ashley Bai, PT

## 2021-09-14 NOTE — UTILIZATION REVIEW
Initial Clinical Review    Admission: Date/Time/Statement:   Admission Orders (From admission, onward)     Ordered        09/13/21 1552  Inpatient Admission  Once                   Orders Placed This Encounter   Procedures    Inpatient Admission     Standing Status:   Standing     Number of Occurrences:   1     Order Specific Question:   Level of Care     Answer:   Med Surg [16]     Order Specific Question:   Estimated length of stay     Answer:   More than 2 Midnights     Order Specific Question:   Certification     Answer:   I certify that inpatient services are medically necessary for this patient for a duration of greater than two midnights  See H&P and MD Progress Notes for additional information about the patient's course of treatment  ED Arrival Information     Expected Arrival Acuity    - 9/13/2021 14:08 Urgent         Means of arrival Escorted by Service Admission type    [facility] Urgent         Arrival complaint    possible leg infection        Chief Complaint   Patient presents with    Evaluation of Abnormal Diagnostic Test     Initial Presentation:   59 yom to ER from PCP for c/o R stump pain, increased fatigue, N&V  HX DM, HTN, R AKA approx 6 mo ago  Presents as above  Admission work-up showing hyponatremia, elevated BUN, lactic acid, CRP & sed rate, chronic osteo R stump on imaging  Admitted to inpatient status for subacute osteomyelitis  Started on IVABT  Day 2- 9/14/21:  IVABT in progress for subacute osteomyelitis  Persistent ecchymosis, tenderness & swelling noted distal R stump  Blood sugars today were 289, 251, 256; currently uncontrolled on current diabetic regimen  Added insulin Humalog 4 units with meals, and increased insulin Lantus to 20 units at bedtime  Continue insulin sliding scale       ED Triage Vitals [09/13/21 1417]   Temperature Pulse Respirations Blood Pressure SpO2   98 6 °F (37 °C) 88 18 130/76 95 %      Temp Source Heart Rate Source Patient Position - Orthostatic VS BP Location FiO2 (%)   Tympanic Monitor Sitting Left arm --      Pain Score       7          Wt Readings from Last 1 Encounters:   09/13/21 81 1 kg (178 lb 12 7 oz)     Additional Vital Signs:   09/14/21 0705  97 2 °F (36 2 °C)Abnormal   72  17  119/60  --  94 %  None (Room air)  Lying   09/14/21 0042  97 8 °F (36 6 °C)  76  16  123/72  89  95 %  None (Room air)  Lying   09/13/21 2104  97 2 °F (36 2 °C)Abnormal   85  16  142/77  --  96 %  None (Room air)  Lying   09/13/21 2100  --  --  --  --  --  --  None (Room air)  --   09/13/21 1955  98 6 °F (37 °C)  77  16  125/70  --  94 %  --  --   09/13/21 1735  --  78  16  125/70  --  96 %  --  --   09/13/21 1417  98 6 °F (37 °C)  88  18  130/76  --  95 %  None (Room air)  Sitting     Pertinent Labs/Diagnostic Test Results:   Results from last 7 days   Lab Units 09/15/21  0452 09/14/21  0459 09/13/21  1424   WBC Thousand/uL 6 90 8 80 8 10   HEMOGLOBIN g/dL 12 4* 12 4* 12 6*   HEMATOCRIT % 36 8* 37 3* 38 1*   PLATELETS Thousands/uL 195 214 227   NEUTROS ABS Thousands/µL 4 50 6 20 5 60     Results from last 7 days   Lab Units 09/15/21  0452 09/14/21  0459 09/13/21  1424   SODIUM mmol/L 132* 135 131*   POTASSIUM mmol/L 4 4 4 2 4 8   CHLORIDE mmol/L 96* 98 95*   CO2 mmol/L 29 30 27   ANION GAP mmol/L 7 7 9   BUN mg/dL 28* 28* 33*   CREATININE mg/dL 0 88 0 93 1 01   EGFR ml/min/1 73sq m 91 87 79   CALCIUM mg/dL 9 8 9 7 9 6   MAGNESIUM mg/dL  --  1 9  --      Results from last 7 days   Lab Units 09/15/21  1038 09/15/21  0603 09/14/21  2137 09/14/21  1630 09/14/21  1111 09/14/21  0637 09/13/21  2113   POC GLUCOSE mg/dl 255* 263* 191* 375* 289* 251* 246*     Results from last 7 days   Lab Units 09/15/21  0452 09/14/21  0459 09/13/21  1424   GLUCOSE RANDOM mg/dL 221* 171* 352*     BETA-HYDROXYBUTYRATE   Date Value Ref Range Status   01/11/2021 6 2 (H) <0 6 mmol/L Final      Results from last 7 days   Lab Units 09/13/21  1731 09/13/21  1424   LACTIC ACID mmol/L 1 5 2 1*     Results from last 7 days   Lab Units 09/15/21  0452 09/13/21  1424   CRP mg/L 18 4* 14 9*   SED RATE mm/hour  --  72*     Results from last 7 days   Lab Units 09/13/21  1429 09/13/21  1424   BLOOD CULTURE  No Growth at 24 hrs  No Growth at 24 hrs      9/13  MRI R femur=  Findings most compatible with chronic osteomyelitis of the distal stump  No abscess  Muscle edema may be myositis or could be acute superimposed on chronic atrophy      ED Treatment:   Medication Administration from 09/13/2021 1408 to 09/13/2021 2108       Date/Time Order Dose Route Action     09/13/2021 1800 apixaban (ELIQUIS) tablet 5 mg 5 mg Oral Given     09/13/2021 1800 magnesium oxide (MAG-OX) tablet 400 mg 400 mg Oral Given     09/13/2021 1711 pregabalin (LYRICA) capsule 100 mg 100 mg Oral Given     09/13/2021 2100 multi-electrolyte (PLASMALYTE-A/ISOLYTE-S PH 7 4) IV solution 75 mL/hr Intravenous Continue to Inpatient Floor     09/13/2021 1843 multi-electrolyte (PLASMALYTE-A/ISOLYTE-S PH 7 4) IV solution 75 mL/hr Intravenous Restarted     09/13/2021 1713 multi-electrolyte (PLASMALYTE-A/ISOLYTE-S PH 7 4) IV solution 75 mL/hr Intravenous New Bag     09/13/2021 1715 cefepime (MAXIPIME) IVPB (premix in dextrose) 2,000 mg 50 mL 2,000 mg Intravenous New Bag     09/13/2021 1740 vancomycin (VANCOCIN) IVPB (premix in dextrose) 1,000 mg 200 mL 1,000 mg Intravenous New Bag        Past Medical History:   Diagnosis Date    Arthritis     Diabetes mellitus ([facility] )     GERD (gastroesophageal reflux disease)     Hypercholesteremia     Hypertension     Ischemia of right lower extremity with suspected rhabdomyolysis 2/6/2021    Left Hydropneumothorax 2/10/2021    Methadone use ([facility] )     Pneumonia due to COVID-19 virus 1/11/2021     Present on Admission:   Essential hypertension   Vitamin D deficiency    Admitting Diagnosis: Osteomyelitis ([facility] ) [M86 9]  Age/Sex: 59 y o  male  Admission Orders:  Pt/ot eval & tx   Scd/foot pumps  accuchecks    Scheduled Medications:  apixaban, 5 mg, Oral, BID  cefepime, 2,000 mg, Intravenous, Q12H  cholecalciferol, 2,000 Units, Oral, Daily  DULoxetine, 30 mg, Oral, HS  fluticasone, 1 spray, Nasal, Daily  insulin glargine, 17 Units, Subcutaneous, HS>increase to 20 units  insulin lispro, 1-5 Units, Subcutaneous, TID AC  insulin lispro, 1-5 Units, Subcutaneous, HS  insulin lispro (HumaLOG) 100 units/mL subcutaneous injection 4 Units Dose: 4 Units, Freq: 3 times daily with meals   lisinopril, 10 mg, Oral, Daily  magnesium oxide, 400 mg, Oral, BID  methadone, 70 mg, Oral, Daily  pantoprazole, 40 mg, Oral, Early Morning  pregabalin, 100 mg, Oral, TID  vancomycin, 15 mg/kg, Intravenous, Q12H    PRN Meds:  acetaminophen, 650 mg, Oral, Q6H PRN  albuterol, 2 puff, Inhalation, Q4H PRN  ondansetron, 4 mg, Intravenous, Q6H PRN    Network Utilization Review Department  ATTENTION: Please call with any questions or concerns to [phone] and carefully listen to the prompts so that you are directed to the right person  All voicemails are confidential   Soledad Pantoja all requests for admission clinical reviews, approved or denied determinations and any other requests to dedicated fax number below belonging to the campus where the patient is receiving treatment   List of dedicated fax numbers for the Facilities:  [address] DENIALS (Administrative/Medical Necessity) [phone]   [address] (Maternity/NICU/Pediatrics) [phone]   [address] [facility]   [address] [address] [facility] [address] [id number] [place] [address] [address]   [facility] [phone]   [place] [address] [phone]

## 2021-09-15 VITALS
HEART RATE: 80 BPM | HEIGHT: 68 IN | RESPIRATION RATE: 18 BRPM | SYSTOLIC BLOOD PRESSURE: 135 MMHG | WEIGHT: 178.79 LBS | TEMPERATURE: 97.8 F | BODY MASS INDEX: 27.1 KG/M2 | DIASTOLIC BLOOD PRESSURE: 65 MMHG | OXYGEN SATURATION: 95 %

## 2021-09-15 LAB
ANION GAP SERPL CALCULATED.3IONS-SCNC: 7 MMOL/L (ref 4–13)
BASOPHILS # BLD AUTO: 0.1 THOUSANDS/ΜL (ref 0–0.1)
BASOPHILS NFR BLD AUTO: 1 % (ref 0–2)
BUN SERPL-MCNC: 28 MG/DL (ref 7–25)
CALCIUM SERPL-MCNC: 9.8 MG/DL (ref 8.6–10.5)
CHLORIDE SERPL-SCNC: 96 MMOL/L (ref 98–107)
CO2 SERPL-SCNC: 29 MMOL/L (ref 21–31)
CREAT SERPL-MCNC: 0.88 MG/DL (ref 0.7–1.3)
CRP SERPL QL: 18.4 MG/L
EOSINOPHIL # BLD AUTO: 0.3 THOUSAND/ΜL (ref 0–0.61)
EOSINOPHIL NFR BLD AUTO: 4 % (ref 0–5)
ERYTHROCYTE [DISTWIDTH] IN BLOOD BY AUTOMATED COUNT: 13.4 % (ref 11.5–14.5)
GFR SERPL CREATININE-BSD FRML MDRD: 91 ML/MIN/1.73SQ M
GLUCOSE SERPL-MCNC: 221 MG/DL (ref 65–99)
GLUCOSE SERPL-MCNC: 255 MG/DL (ref 65–140)
GLUCOSE SERPL-MCNC: 263 MG/DL (ref 65–140)
HCT VFR BLD AUTO: 36.8 % (ref 42–47)
HGB BLD-MCNC: 12.4 G/DL (ref 14–18)
LYMPHOCYTES # BLD AUTO: 1.3 THOUSANDS/ΜL (ref 0.6–4.47)
LYMPHOCYTES NFR BLD AUTO: 19 % (ref 21–51)
MCH RBC QN AUTO: 28 PG (ref 26–34)
MCHC RBC AUTO-ENTMCNC: 33.6 G/DL (ref 31–37)
MCV RBC AUTO: 83 FL (ref 81–99)
MONOCYTES # BLD AUTO: 0.8 THOUSAND/ΜL (ref 0.17–1.22)
MONOCYTES NFR BLD AUTO: 11 % (ref 2–12)
NEUTROPHILS # BLD AUTO: 4.5 THOUSANDS/ΜL (ref 1.4–6.5)
NEUTS SEG NFR BLD AUTO: 65 % (ref 42–75)
PLATELET # BLD AUTO: 195 THOUSANDS/UL (ref 149–390)
PMV BLD AUTO: 8.5 FL (ref 8.6–11.7)
POTASSIUM SERPL-SCNC: 4.4 MMOL/L (ref 3.5–5.5)
PROCALCITONIN SERPL-MCNC: 0.09 NG/ML
RBC # BLD AUTO: 4.42 MILLION/UL (ref 4.3–5.9)
SODIUM SERPL-SCNC: 132 MMOL/L (ref 134–143)
VANCOMYCIN TROUGH SERPL-MCNC: 16.6 UG/ML (ref 5–12)
WBC # BLD AUTO: 6.9 THOUSAND/UL (ref 4.8–10.8)

## 2021-09-15 PROCEDURE — 97535 SELF CARE MNGMENT TRAINING: CPT

## 2021-09-15 PROCEDURE — 80202 ASSAY OF VANCOMYCIN: CPT | Performed by: INTERNAL MEDICINE

## 2021-09-15 PROCEDURE — 97110 THERAPEUTIC EXERCISES: CPT

## 2021-09-15 PROCEDURE — 80048 BASIC METABOLIC PNL TOTAL CA: CPT

## 2021-09-15 PROCEDURE — 85025 COMPLETE CBC W/AUTO DIFF WBC: CPT

## 2021-09-15 PROCEDURE — 99254 IP/OBS CNSLTJ NEW/EST MOD 60: CPT | Performed by: PHYSICIAN ASSISTANT

## 2021-09-15 PROCEDURE — 99255 IP/OBS CONSLTJ NEW/EST HI 80: CPT | Performed by: INTERNAL MEDICINE

## 2021-09-15 PROCEDURE — 86140 C-REACTIVE PROTEIN: CPT

## 2021-09-15 PROCEDURE — 82948 REAGENT STRIP/BLOOD GLUCOSE: CPT

## 2021-09-15 PROCEDURE — 84145 PROCALCITONIN (PCT): CPT | Performed by: NURSE PRACTITIONER

## 2021-09-15 RX ADMIN — INSULIN LISPRO 2 UNITS: 100 INJECTION, SOLUTION INTRAVENOUS; SUBCUTANEOUS at 11:23

## 2021-09-15 RX ADMIN — PREGABALIN 100 MG: 100 CAPSULE ORAL at 09:17

## 2021-09-15 RX ADMIN — Medication 2000 UNITS: at 09:17

## 2021-09-15 RX ADMIN — INSULIN LISPRO 4 UNITS: 100 INJECTION, SOLUTION INTRAVENOUS; SUBCUTANEOUS at 11:23

## 2021-09-15 RX ADMIN — METHADONE HYDROCHLORIDE 70 MG: 10 TABLET ORAL at 09:17

## 2021-09-15 RX ADMIN — MAGNESIUM OXIDE TAB 400 MG (241.3 MG ELEMENTAL MG) 400 MG: 400 (241.3 MG) TAB at 09:16

## 2021-09-15 RX ADMIN — LISINOPRIL 10 MG: 10 TABLET ORAL at 09:17

## 2021-09-15 RX ADMIN — FLUTICASONE PROPIONATE 1 SPRAY: 50 SPRAY, METERED NASAL at 09:19

## 2021-09-15 RX ADMIN — PANTOPRAZOLE SODIUM 40 MG: 40 TABLET, DELAYED RELEASE ORAL at 05:13

## 2021-09-15 RX ADMIN — VANCOMYCIN HYDROCHLORIDE 1000 MG: 1 INJECTION, SOLUTION INTRAVENOUS at 05:10

## 2021-09-15 RX ADMIN — APIXABAN 5 MG: 5 TABLET, FILM COATED ORAL at 09:17

## 2021-09-15 RX ADMIN — CEFEPIME HYDROCHLORIDE 2000 MG: 2 INJECTION, SOLUTION INTRAVENOUS at 04:29

## 2021-09-15 RX ADMIN — INSULIN LISPRO 2 UNITS: 100 INJECTION, SOLUTION INTRAVENOUS; SUBCUTANEOUS at 09:19

## 2021-09-15 RX ADMIN — INSULIN LISPRO 4 UNITS: 100 INJECTION, SOLUTION INTRAVENOUS; SUBCUTANEOUS at 09:20

## 2021-09-15 NOTE — ASSESSMENT & PLAN NOTE
· Recent x-ray of right femur shows findings concerning for possible osteomyelitis  · MRI : Loss of normal fatty marrow signal on T1-weighted sequences distal 2 0 cm of the stump with accompanying low signal intensity centrally on fluid sensitive sequences, suggesting chronic infection and necrosis   Peripheral high signal, most compatible with granulation tissue   Surrounding periosteal edema  Findings most compatible with chronic osteomyelitis of the distal stump  · Patient was treated with cefepime and vancomycin  · Orthopedic surgery consulted, as per Ortho patients amputation was done by vascular surgeon, therefore vascular surgery should be consulted with regards for need of surgery  · Vascular surgery was contacted, vascular requesting Infectious Disease consult to determine need for source control  · Infectious disease consult completed, recommendation to discontinue vancomycin and cefepime as patient is not systemically, has no sinus cellulitis and improved surgical cultures  · IV antibiotics were discontinued   · Patient is not in need of immediate source control, and can be seen by vascular surgery on outpatient basis  · ID recommended surgical debridement in obtaining tissue routine, AFP and follow cultures to be completed as outpatient once patient has been on and off of antibiotics for 5-7 days  · Vascular Surgery was made aware of Infectious Disease input- they are agreeable with recommendations and will schedule an outpatient appointment to be seen

## 2021-09-15 NOTE — ASSESSMENT & PLAN NOTE
· Will hold oral diabetic medications for now  · Patient recent blood sugars today were 191, 255, 263,   On 9/14/21   insulin Humalog 4 units was added with meals, and increased insulin Lantus to 20 units at bedtime  · Patient reports that at home he does not follow a diabetic diet and that his sugars normally run in the high 200's  · Patient and family educated on diabetic diet     · Continue insulin sliding scale  · Will continue to monitor blood sugars  · Patient will follow up with PCP for diabetes management upon discharge

## 2021-09-15 NOTE — CONSULTS
Vancomycin IV Pharmacy-to-Dose Consultation    Rogelio Mejia is a 59 y o  male who was receiving Vancomycin IV with management by the Pharmacy Consult service for treatment of other bone joint infection  The patient's Vancomycin therapy has been discontinued  Thank you for allowing us to take part in this patient's care  Pharmacy will sign-off now; please call or re-consult if there are any questions          Sosa Meneses RPh

## 2021-09-15 NOTE — NURSING NOTE
Vs by YESENIA Plummer Peer , discharged and discharge instructions given phyllis on diabetes and diet  Verbalizes understanding of same

## 2021-09-15 NOTE — CONSULTS
Consultation - Orthopedics   Sacha Macias 59 y o  male MRN: [id number]  Unit/Bed#: -02 Encounter: [id number]      Assessment/Plan     Assessment:  Status post above knee amputation from 01/14/2021  Plan:  X-rays show no fractures or dislocations  There are no lytic or blastic lesions  No signs of active osteomyelitis  If the patient continues to have issues he should follow-up with his operating surgeon  He is acceptable to this plan  History of Present Illness   Physician Requesting Consult: Karin Chen, *  Reason for Consult / Principal Problem: possible osteomyelitis   HPI: Sacha Macias is a 59y o  year old male who presented to the emergency department complaining of pain along his right stump  The patient is status post above knee amputation from 01/14/2021  The patient states he has noted increased tenderness along the distal aspect of his stump  He states that his prosthesis is ill fitting  He has also been using a stump   He denies any new falls or trauma prior  An x-ray was performed by his PCP which showed possible osteomyelitis of his right femur  At that point the patient was instructed to go to the emergency department  He states that his pain has somewhat improved since his admission  He denies any numbness or tingling  He denies any fever or chills  Vascular surgery was also consult on the patient  Inpatient consult to Orthopedic Surgery  Consult performed by: Hailee Hill PA-C  Consult ordered by: Karin Chen MD          Review of Systems   Constitutional: Negative for chills, fever and unexpected weight change  HENT: Negative for nosebleeds and sore throat  Eyes: Negative for pain, redness and visual disturbance  Respiratory: Negative for cough, shortness of breath and wheezing  Cardiovascular: Negative for chest pain, palpitations and leg swelling  Gastrointestinal: Negative for abdominal pain, nausea and vomiting  Endocrine: Negative for polydipsia and polyuria  Genitourinary: Negative for dysuria and hematuria  Musculoskeletal: Positive for arthralgias, gait problem and myalgias  As noted in HPI   Skin: Negative for rash and wound  Neurological: Negative for dizziness, numbness and headaches  Psychiatric/Behavioral: Negative for decreased concentration and suicidal ideas  The patient is not nervous/anxious  Historical Information   Past Medical History:   Diagnosis Date    Arthritis     Diabetes mellitus ([facility] )     GERD (gastroesophageal reflux disease)     Hypercholesteremia     Hypertension     Ischemia of right lower extremity with suspected rhabdomyolysis 2/6/2021    Left Hydropneumothorax 2/10/2021    Methadone use ([facility] )     Pneumonia due to COVID-19 virus 1/11/2021     Past Surgical History:   Procedure Laterality Date    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    Procedure: AMPUTATION ABOVE KNEE (AKA);   Surgeon: Yossi Lane MD;  [facility]: [facility];  Service: Vascular    AMPUTATION ABOVE KNEE (AKA) Right 1/18/2021    Procedure: AMPUTATION ABOVE KNEE (AKA) FORMALIZATION,  R AKA wound washout, wound closure;  Surgeon: Yossi Lane MD;  [facility];  Service: Vascular    ARTERIOGRAM Right 1/13/2021    Procedure: ARTERIOGRAM;  Surgeon: Tex Paniagua DO;  [facility]: [facility];  Service: Vascular    IR CHEST TUBE PLACEMENT  2/10/2021    IR LOWER EXTREMITY ANGIOGRAM  1/14/2021    THROMBECTOMY W/ EMBOLECTOMY Right 1/13/2021    Procedure: EMBOLECTOMY/THROMBECTOMY LOWER EXTREMITY;  Surgeon: Tex Paniagua DO;  [facility];  Service: Vascular     Social History   Social History     Substance and Sexual Activity   Alcohol Use Not Currently     Social History     Substance and Sexual Activity   Drug Use No    Comment: methadone     E-Cigarette/Vaping    E-Cigarette Use Never User      E-Cigarette/Vaping Substances    Nicotine No     THC No     CBD No  Flavoring No     Other No     Unknown No      Social History     Tobacco Use   Smoking Status Former Smoker   Smokeless Tobacco Never Used     Family History: non-contributory    Meds/Allergies   all current active meds have been reviewed  Allergies   Allergen Reactions    Varenicline        Objective   Vitals: Blood pressure 135/65, pulse 80, temperature 97 8 °F (36 6 °C), temperature source Tympanic, resp  rate 18, height 5' 8" (1 727 m), weight 81 1 kg (178 lb 12 7 oz), SpO2 95 %  ,Body mass index is 27 19 kg/m²  Intake/Output Summary (Last 24 hours) at 9/15/2021 1344  Last data filed at 9/15/2021 0917  Gross per 24 hour   Intake 240 ml   Output 1800 ml   Net -1560 ml     I/O last 24 hours: In: 360 [P O :360]  Out: 1800 [Urine:1800]    Invasive Devices     Peripheral Intravenous Line            Peripheral IV 09/13/21 Left Forearm 1 day                Physical Exam  Ortho Exam  Right lower extremity is neurovascularly intact  Compartments are soft  Stump incision is well healed  No warmth, erythema or ecchymosis present  Slight tenderness to palpation along the distal femur  Sensation intact  No significant edema  Normal range of motion of hip  AKA ambutation  Physical Exam   Constitutional: Appears well-developed and well-nourished  No distress  HENT:   Head: Normocephalic  Eyes: Conjunctivae are normal  Right eye exhibits no discharge  Left eye exhibits no discharge  No scleral icterus  Cardiovascular: Normal rate  Pulmonary/Chest: Effort normal    Neurological: Alert and oriented to person, place, and time  Skin: Skin is warm and dry  No rash noted  The patient is not diaphoretic  No erythema  No pallor  Psychiatric: Normal mood and affect  Behavior is normal  Judgment and thought content normal    Lab Results: I have personally reviewed pertinent lab results  Imaging Studies: I have personally reviewed pertinent reports      EKG, Pathology, and Other Studies: I have personally reviewed pertinent reports  VTE Prophylaxis: Sequential compression device (Venodyne)     Code Status: Level 1 - Full Code  Advance Directive and Living Will:      Power of :    POLST:      Counseling / Coordination of Care  Total floor / unit time spent today 30 minutes  Greater than 50% of total time was spent with the patient and / or family counseling and / or coordination of care

## 2021-09-15 NOTE — ASSESSMENT & PLAN NOTE
· Upon admission patient had a sodium level of 131  He was  treated with IVF and sodium level was monitored and redrawn in the AM, sodium had improved to 135  · 9/15/21 Sodium level of 132, corrected sodium of 135  Hyponatremia has resolved  · Per reviewing patient's history and speaking with patient, he seems to have a chronic hyponatremia possibly secondary to volume depletion

## 2021-09-15 NOTE — DISCHARGE INSTRUCTIONS
Diabetes and Nutrition   WHAT YOU NEED TO KNOW:   Nutrition plans help with healthy eating patterns that improve health  Nutrition plans and regular exercise help keep your blood sugar levels steady  They also help delay or prevent complications of diabetes, such as diabetic kidney disease  DISCHARGE INSTRUCTIONS:   Call your local emergency number (911 in the [address]) if:   · You have any of the following signs of a heart attack:      ? Squeezing, pressure, or pain in your chest    ? You may  also have any of the following:     § Discomfort or pain in your back, neck, jaw, stomach, or arm    § Shortness of breath    § Nausea or vomiting    § Lightheadedness or a sudden cold sweat      Seek care immediately if:   · You have a low blood sugar level and it does not improve with treatment  Symptoms are trouble thinking, a pounding heartbeat, and sweating  · Your blood sugar level is above 240 mg/dL and does not come down within 15 minutes of treatment  · You have ketones in your blood or urine  · You have nausea or are vomiting and cannot keep any food or liquid down  · You have blurred or double vision  · Your breath has a fruity, sweet smell, or your breathing is shallow  Call your doctor or diabetes care team if:   · Your blood sugar levels are higher than your target goals  · You often have low blood sugar levels  · You have trouble coping with diabetes, or you feel anxious or depressed  · You have questions or concerns about your condition or care  A dietitian will help you create a nutrition plan  to meet your needs and your family's needs  The goal is for you to reach or maintain healthy weight, blood sugar, blood pressure, and lipid levels  You should meet with the dietitian at least 1 time each year   You will learn the following:  · How food affects your blood sugar levels    · How to create healthy eating habits    · How to make food choices based on your activity level, weight, and glucose levels    · How your favorite foods may fit into your plan    · How to keep track of carbohydrates    · Correct portion sizes for each food    · Changes you can make to your plan if you get pregnant or are breastfeeding    Do not skip meals: The goal is to keep your blood sugar level steady  Blood sugar levels may drop too low if you have received insulin and do not eat  Eat more high-fiber foods:  High-fiber foods include fresh or frozen fruits and vegetables, whole-grain breads, and beans  Fiber helps control or lower blood sugar and cholesterol levels  Choose whole fruits instead of fruit juice as much as possible  Sugar may be added to juice, and fiber may be removed  Choose heart-healthy fats:  Foods high in heart-healthy fats include olive oil, nuts, avocados, and fatty fish, such as salmon and tuna  Foods high in unhealthy fats include red meat, full-fat dairy products, and soft margarine  Unhealthy fats can increase your risk for heart disease, increase bad cholesterol, and lower good cholesterol  Choose complex carbohydrates:  Foods with complex carbohydrates include brown rice, whole-grain breads and cereals, and cooked beans  Foods with simple carbohydrates include white bread, white rice, most cold cereals, and snack foods  Your plan will include the amount of carbohydrate to have at one time or in a day  Your blood sugar level can get too high if you eat too much carbohydrate at one time  Blood sugar levels do not spike as high or drop as quickly with complex carbohydrates as with simple carbohydrates  Choose complex carbohydrates whenever possible  Have less sodium (salt): The risk for high blood pressure (BP) increases with high-sodium foods  Limit high-sodium foods, such as soy sauce, potato chips, and canned soup  Do not add salt to food you cook  Limit your use of table salt  Read labels to have no more than 2,300 milligrams of sodium in one day       Limit artificial sweeteners: These may be found in food or drinks, such as diet soft drinks or other low-calorie beverages  Artificial sweeteners are low in calories  They may help you lower your overall calories and carbohydrates  It is important not to have more calories from other foods to make up for the calories saved  Artificial sweeteners do not have any nutrition  Eat whole foods and drink water as much as possible  Your plan may include beverages with artificial sweeteners for a short time  These can help you transition from high-sugar beverages to water  Use the plate method for each meal:  This method can help you eat the right amount of carbohydrates and keep your blood sugar levels under control  · Draw an imaginary line down the middle of a 9-inch dinner plate  On one side, draw another line to divide that section in half  Your plate will have one large section and 2 small sections  · Fill the largest section with non-starchy vegetables  These include broccoli, spinach, cucumbers, peppers, cauliflower, and tomatoes  · Add a starch to one of the small sections  Starches include pasta, rice, whole-grain bread, tortillas, corn, potatoes, and beans  · Add meat or another source of protein to the other small section  Examples include chicken or turkey without skin, fish, lean beef or pork, low-fat cheese, tofu, and eggs  · Add dairy products or fruit next to your plate if your meal plan allows  Examples of dairy include skim or 1% milk and low-fat yogurt  If you do not drink milk or eat dairy products, you may be able to add another serving of starchy food instead  · Have a low-calorie or calorie-free drink with your meal  Examples include water or unsweetened tea or coffee  Know the risks if you choose to drink alcohol:  Alcohol can cause hypoglycemia (low blood sugar level), especially if you use insulin  Alcohol can cause high blood sugar and BP levels, and weight gain if you drink too much  Women 21 years or older and men 72 years or older should limit alcohol to 1 drink a day  Men aged 24 to 59 years should limit alcohol to 2 drinks a day  A drink of alcohol is 12 ounces of beer, 5 ounces of wine, or 1½ ounces of liquor  Hypoglycemia can happen hours after you drink alcohol  Check your blood sugar level for several hours after you drink alcohol  Have a source of fast-acting carbohydrates with you in case your level goes too low  You need immediate care if you have signs or symptoms of hypoglycemia, such as sweating, confusion, or fainting  Maintain a healthy weight:  A healthy weight can help you control your diabetes  You can maintain a healthy weight with a nutrition plan and exercise  Ask your healthcare provider how much you should weigh  Ask him or her to help you create a weight loss plan if you are overweight  Together you can set weight loss and maintenance goals  Follow up with your doctor or diabetes team as directed:  Write down your questions so you remember to ask them during your visits  © Copyright [employer] 2021 Information is for End User's use only and may not be sold, redistributed or otherwise used for commercial purposes  All illustrations and images included in CareNotes® are the copyrighted property of A D A M , Inc  or [address] [place] [address]  The above information is an  only  It is not intended as medical advice for individual conditions or treatments  Talk to your doctor, nurse or pharmacist before following any medical regimen to see if it is safe and effective for you  Meal Planning with Diabetes Exchanges   WHAT YOU NEED TO KNOW:   Exchanges are servings of food that contain similar amounts of carbohydrate, fat, protein, and calories within a food group  The exchanges can be used to develop a healthy meal plan that helps to keep your blood sugar within the recommended levels   A meal plan with the right amount of carbohydrates is especially important  Your blood sugar naturally rises after you eat carbohydrates  Too many carbohydrates in 1 meal or snack can raise your blood sugar level  Carbohydrates are found in starches, fruit, milk, yogurt, and sweets  DISCHARGE INSTRUCTIONS:   Call your doctor if:   · You have high blood sugar levels during a certain time of day, or almost all of the time  · You often have low blood sugar levels  · You have questions or concerns about your condition or care  Create a meal plan with exchanges:  A dietitian will work with you to develop a healthy meal plan that is right for you  This meal plan will include the amount of exchanges you can have from each food group throughout the day  Follow your meal plan by keeping track of the amount of exchanges you eat for each meal and snack  Your meal plan will be based on your age, weight, blood sugar levels, medicine, and activity level  Starch food group exchanges:  Each exchange below contains about 15 grams of carbohydrate , 3 grams of protein, 1 gram of fat, and 80 calories  · 1 ounce of white, whole wheat or rye bread (1 slice)    · 1 ounce of bagel (about ¼ of a bagel)    · 1 6-inch flour or corn tortilla or 1 4-inch pancake (about ¼ inch thick)    · ? cup of cooked pasta or rice    · ¾ cup of dry, ready-to-eat cereal with no sugar added     · ½ cup of cooked cereal, such as oatmeal    · 3 deon cracker squares or 8 animal crackers    · 6 saltine-type crackers or     · 3 cups of popcorn or ¾ ounce of pretzels     · Starchy vegetables and cooked legumes:      ? ½ cup of corn, green peas, sweet potatoes, or mashed potatoes     ? ¼ of a large baked potato     ? 1 cup of acorn, butternut squash, or pumpkin     ? ½ cup of beans, lentils, or peas (such as victoria, kidney, or black-eyed)    ? ? cup of lima beans    Fruit group exchanges:  Each exchange contains about 15 grams of carbohydrate  and 60 calories    · 1 small (4 ounce) apple, banana orange, or nectarine    · ½ cup of canned or fresh fruit    · ½ cup (4 ounces) of unsweetened fruit juice    · 2 tablespoons of dried fruit    Milk group exchanges:  Each exchange contains about 12 grams of carbohydrate  and 8 grams of protein  The amount of fat and calories in each serving depends on the type of milk (such as whole, low-fat, or fat-free)  · 1 cup fat-free or low-fat milk    · ¾ cup of plain, nonfat yogurt    · 1 cup fat-free, flavored yogurt with artificial (no calorie) sweetener    Non-starchy vegetable group exchanges:  Each exchange contains about 5 grams of carbohydrate , 2 grams of protein, and 25 calories  Examples include beets, broccoli, cabbage, carrots, cauliflower, cucumber, mushrooms, tomatoes, and zucchini  · ½ cup of cooked vegetables or 1 cup of raw vegetables     · ½ cup of vegetable juice    Meat and meat substitute group exchanges:  Each exchange of a lean meat  listed below contains about 7 grams of protein, 0 to 3 grams of fat, and 45 calories  The meat and meat substitutes food group does not contain any carbohydrates  Medium and high-fat meats have more calories  · 1 ounce of chicken or turkey without skin, or 1 ounce of fish (not breaded or fried)     · 1 ounce of lean beef, pork, or lamb     · 1-inch cube or 1 ounce of low-fat cheese     · 2 egg whites or ¼ cup of egg substitute     · ½ cup of tofu    Sweets, desserts, and other carbohydrate group exchanges:   · Sweets and other desserts:  Each exchange has about 15 grams of carbohydrate   ? 1 ounce of zulma food cake or 2-inch square cake (unfrosted)    ? 2 small cookies     ? ½ cup of sugar-free, fat-free ice cream    ? 1 tablespoon of syrup, jam, jelly, table sugar, or honey    · Combination foods:     ? 1 cup of an entrée, such as lasagna, spaghetti with meatballs, macaroni and cheese, and chili with beans (each serving counts as 2 carbohydrate exchanges )     ?  1 cup of tomato or vegetable beef soup (each serving counts as 1 carbohydrate exchange )    Fat group exchanges:  Each exchange contains 5 grams of fat and 45 calories  · 1 teaspoon of oil (such as canola, olive, or corn oil)     · 6 almonds or cashews, 10 peanuts, or 4 pecan halves     · 2 tablespoons of avocado     · ½ tablespoon of peanut butter     · 1 teaspoon of regular margarine or 2 teaspoons of low-fat margarine     · 1 teaspoon of regular butter or 1 tablespoon of low-fat butter     · 1 teaspoon of regular mayonnaise or 1 tablespoon of low-fat mayonnaise     · 1 tablespoon of regular salad dressing or 2 tablespoons of low-fat salad dressing    Free foods: The foods on this list are called free foods because they have very few calories  Free foods usually do not increase your blood sugar if you limit them  · 1 tablespoon of catsup or taco sauce     · ¼ cup of salsa     · 2 tablespoons of sugar-free syrup or 2 teaspoons of light jam or jelly     · 1 tablespoon of fat-free salad dressing     · 4 tablespoons of fat-free margarine or fat-free mayonnaise     · Sugar-free drinks: diet soda, sugar-free drink mixes, or mineral water     · Low-sodium bouillon or fat-free broth     · Mustard     · Seasonings such as spices, herbs, and garlic     · Sugar-free gelatin without added fruit    Other healthy nutrition guidelines:   · Limit drinks with sugar substitutes  Your dietitian or healthcare provider will encourage you to drink water  Water helps your kidneys to function properly  Ask how much water you should drink every day  · Eat more fiber  Choose foods that are good sources of fiber, such as fruits, vegetables, and whole grains  Cereals that contain 5 or more grams of fiber per serving are good sources of fiber  Legumes such as garbanzo, victoria beans, kidney beans, and lentils are also good sources  · Limit fat  Ask your dietitian or healthcare provider how much fat you should eat each day  Choose foods low in fat, saturated fat, trans fat, and cholesterol  Examples include turkey or chicken without the skin, fish, lean cuts of meat, and beans  Low-fat dairy foods, such as low-fat or fat-free milk and low-fat yogurt are also good choices  Omega-3 fatty acids are healthy fats that are found in canola oil, soybean oil and fatty fish  [place], albacore tuna, and sardines are good sources of omega 3 fatty acids  Eat 2 servings of these types of fish each week  Do not eat fried fish  · Limit sugar  Sugar and sweets must be counted toward the carbohydrate exchanges that you can have within your meal plan  Limit sugar and sweets because they are usually also high in calories and fat  Eat smaller portions of sweets by sharing a dessert or asking for a child-size portion at a restaurant  · Limit sodium  (salt) to about 2,300 mg per day  You may need to eat even less sodium if you have certain medical conditions  Foods high in sodium include soy sauce, potato chips, and soup  · Limit alcohol  Ask your healthcare provider if it is safe for you to drink alcohol  If alcohol is safe for you to have, eat a meal when you drink alcohol  If you drink alcohol on an empty stomach, your blood sugar may drop to a low level  Women 21 years or older and men 72 years or older should limit alcohol to 1 drink a day  Men aged 24 to 59 years should limit alcohol to 2 drinks a day  A drink of alcohol is 5 ounces of wine, 12 ounces of beer, or 1½ ounces of liquor  Other ways to manage your diabetes:   · Control your blood sugar level  Test your blood sugar level regularly and keep a record of the results  Ask your healthcare provider when and how often to test your blood sugar  You may need to check your blood sugar level at least 3 times each day  · Talk to your healthcare provider about your weight  Ask if you need to lose weight, and how much you need to lose  If you are overweight, you may need to make other changes to lose weight   Ask your healthcare provider to help you create a weight loss program      · Get regular physical activity  Physical activity can help decrease your blood sugar level  It can also help to decrease your risk for heart disease and help you lose weight  Adults should have moderate intensity physical activity for at least 150 minutes every week  Spread the amount of activity over at least 3 days a week  Do not skip more than 2 days in a row  Children should get at least 60 minutes of moderate physical activity on most days of the week  Examples of moderate physical activity include brisk walking, running, and swimming  Do not sit for longer than 30 minutes  Work with your healthcare provider to create a plan for physical activity  © Copyright [employer] 2021 Information is for End User's use only and may not be sold, redistributed or otherwise used for commercial purposes  All illustrations and images included in CareNotes® are the copyrighted property of A D A M , Inc  or [facility] Cole Winkler   The above information is an  only  It is not intended as medical advice for individual conditions or treatments  Talk to your doctor, nurse or pharmacist before following any medical regimen to see if it is safe and effective for you  Hyponatremia   WHAT YOU NEED TO KNOW:   Hyponatremia occurs when the amount of sodium (salt) in your blood is lower than normal  Sodium is an electrolyte (mineral) that helps your muscles, heart, and digestive system work properly  It helps control blood pressure and fluid balance  DISCHARGE INSTRUCTIONS:   Follow up with your healthcare provider as directed: You may need to return for more tests  Write down your questions so you remember to ask them during your visits  Nutrition:  You may need to increase your intake of sodium  Foods that are high in sodium include milk, packaged snacks such as pretzels, or processed meats (bowles, sausage, and ham)   Ask your dietitian to help you create a meal plan that is right for you  Liquids: Follow your healthcare provider's advice if you need to limit the amount of liquid you drink  Ask how much liquid to drink each day and which liquids are best for you  You may be asked to drink liquids that have water, sugar, and salt, such as juices, milk, or sports drinks  These liquids help your body hold in fluid and prevent dehydration  Contact your healthcare provider if:   · You have muscle cramps or twitching  · You feel very weak or tired  · You have nausea or are vomiting  · You have questions or concerns about your condition or care  Seek care immediately or call 911 if:   · You have a seizure  · You have an irregular heartbeat  · You have trouble breathing  · You cannot move your arms and legs  · You are confused or cannot think clearly  © Copyright [employer] 2021 Information is for End User's use only and may not be sold, redistributed or otherwise used for commercial purposes  All illustrations and images included in CareNotes® are the copyrighted property of [employer]  or [facility] Cole Winkler   The above information is an  only  It is not intended as medical advice for individual conditions or treatments  Talk to your doctor, nurse or pharmacist before following any medical regimen to see if it is safe and effective for you

## 2021-09-15 NOTE — CASE MANAGEMENT
Case Management Assessment & Discharge Planning Note    Patient name Jose Alvarado  Location [address] 113/-[id number] MRN [id number]  : 1960 Date 9/15/2021       Current Admission Date: 2021  Current Admission Diagnosis:  Subacute osteomyelitis of right femur ([address] )  Previous Admission - Discharge Date:21   LOS (days): 2  Geometric Mean LOS (GMLOS) (days): 3 10  Days to GMLOS:1 3 Previous Discharge Diagnosis:  There are no discharge diagnoses documented for the most recent discharge  OBJECTIVE:    Risk of Unplanned Readmission Score: 29   Bundle(if applicable):    Current admission status: Inpatient       Preferred Pharmacy:   [facility] [address] CATHY CHOU [facility] [place] [address], [address] [address] [address] [place] [address]  Phone: [phone] Fax: [phone] [address] [address]  Phone: [phone] Fax: [phone] [address] [facility] 94 [facility] [address] [facility] [address]  Phone: [phone] Fax: [phone]    Primary Care Provider: VERONICA Garcia    Primary Insurance: Zoe Fisher  Secondary Insurance:     [place]:  [place] [address]   Primary Phone: [phone] (Mobile)  Home Phone: [address] of [address]: [place]    Readmission Root Cause  30 Day Readmission: No    Patient Information  Mental Status: Alert  During Assessment patient was accompanied by: Not accompanied during assessment  Assessment information provided by[de-identified] Patient  Primary Caregiver: Family  What city do you live in?: [facility] entry access options   Select all that apply : Ramp  Type of Current Residence: 2 story home  Upon entering residence, is there a bedroom on the main floor (no further steps)?: Yes  Upon entering residence, is there a bathroom on the main floor (no further steps)?: No (Uses a BSC)  Living Arrangements: Lives w/ Extended Family  Is patient a ?: No    Activities of Daily Living Prior to Admission  Functional Status: Assistance  Completes ADLs independently?: No  Level of ADL dependence: Assistance  Ambulates independently?: No  Level of ambulatory dependence: Assistance  Does patient use assisted devices?: Yes  Assisted Devices (DME) used:  Wheelchair  Does patient currently own DME?: Yes  What DME does the patient currently own?: Bedside Commode, Wheelchair, Adah Sonia  Does patient have a history of Outpatient Therapy (PT/OT)?: No  Does the patient have a history of Short-Term Rehab?: Yes [facility])  Does patient currently have [facility]?: No    Patient Information Continued  Income Source: SSI/SSD  Does patient have prescription coverage?: Yes  Does patient receive dialysis treatments?: No  Does patient have a history of substance abuse?: No  Does patient have a history of Mental Health Diagnosis?: No    PHQ 2/9 Screening   Reviewed PHQ 2/9 Depression Screening Score?: No    Means of Transportation  Means of Transport to Appts[de-identified] Family transport  In the past 12 months, has lack of transportation kept you from medical appointments or from getting medications?: No  In the past 12 months, has lack of transportation kept you from meetings, work, or from getting things needed for daily living?: No  Was application for public transport provided?: No    DISCHARGE DETAILS:    Discharge planning discussed with[de-identified] Pt  Freedom of Choice: Yes    [address]         Is the patient interested in [facility] at discharge?: Yes  [address] requested[de-identified] Nursing, Physical [address] Name[de-identified] Other (please enter name in comment) Jose Monaco [facility])  [address] Provider[de-identified] PCP  [address] Needed[de-identified] Strengthening/Theraputic Exercises to Improve Function, Evaluate Functional Status and Safety  Homebound Criteria Met[de-identified] Requires the Assistance of Another Person for Safe Ambulation or to Leave the Home  Supporting Clincal Findings[de-identified] Bed Bound or Wheelchair Bound (NOt wearing prosthetic d/t pain)    DME Referral Provided  Referral made for DME?: No      Discharge Destination Plan[de-identified] Home  Transportation at Discharge?: Yes (Abundio Mazariegos will transport)     Pt receives assistance at home from his nephew with ADl's and IADL's  PT is recommending [facility] services  A post acute care recommendation was made by your care team for [place] [address]  Discussed Freedom of Choice with patient  List of agencies given to patient via in person  patient aware the list is custom filtered for them by preference  and that  [facility] post acute providers are designated  Pt states he had [facility] in the past and would like them again  Referral made for same  Spoke to Shahid from [facility] who accepted the pt  Pt has been evaluated by [facility] and is stable to be discharged  Nephew will transport    Faxed AVS to [facility] at [phone]

## 2021-09-15 NOTE — OCCUPATIONAL THERAPY NOTE
09/15/21 1120   OT Last Visit   OT Visit Date 09/15/21   Restrictions/Precautions   Weight Bearing Precautions Per Order Yes   RLE Weight Bearing Per Order NWB   Braces or Orthoses MAFO  (for LLE)   Other Precautions WBS; Fall Risk   General   Response to Previous Treatment Patient with no complaints from previous session  ("I have a way to go yet"  )   Lifestyle   Reciprocal Relationships nephew assists with transfers at home   Pain Assessment   Pain Assessment Tool Pain Assessment not indicated - pt denies pain   ADL   Where Assessed Edge of bed   Equipment Provided   (patient utilizes long-handled sponge at home)   Grooming Comments patient requested supplies for hair washing and completed same (with shampoo cap) with good results    UB Bathing Assistance 5  Supervision/Setup   UB Bathing Deficit Setup   LB Bathing Assistance 4  Minimal Assistance   LB Bathing Deficit Setup; Increased time to complete;Left lower leg including foot   UB Dressing Assistance 5  Supervision/Setup   UB Dressing Deficit Setup   LB Dressing Comments not tested at this time   Transfers   Additional Comments patient reports considering a chair lift for at home so he could get to his shower - the cost is inhibitory    Therapeutic Excerise-Strength   UE Strength Yes   Right Upper Extremity- Strength   R Shoulder Flexion; Horizontal ABduction; Other (Comment)  (pro/re-traction; *flexion exer  divided into 2 sets )   R Elbow Elbow flexion;Elbow extension  (in forward And reverse;  Also: supin/pron-ation )   R Weight/Reps/Sets 2 pound weight - 15 reps each    Left Upper Extremity-Strength   L Weights/Reps/Sets all exers  same as RUE above    Coordination   Gross Motor WFL   Dexterity reports mild limitation of R side   Cognition   Overall Cognitive Status WFL   Arousal/Participation Alert; Cooperative   Attention Within functional limits   Orientation Level Oriented X4   Following Commands Follows all commands and directions without difficulty Comments patient verbalizes interest in continuation of home exercise   Activity Tolerance   Activity Tolerance Patient limited by fatigue  (minimally for today's activities)   Assessment   Assessment Patient participated in Skilled OT session this date with interventions consisting of ADL re training with the use of correct body mechnaics and therapeutic exercise to: increase functional use of BUEs, increase BUE muscle strength    Patient agreeable to OT treatment session, upon arrival patient was found seated at edge of bed (session completed at this position)  Patient requiring occasional rest periods  Patient continues to be functioning below baseline level, occupational performance remains limited secondary to factors listed above and increased risk for falls and injury  From OT standpoint, recommendation at time of d/c would be home with home health rehabilitation  Patient to benefit from continued Occupational Therapy treatment while in the hospital to address deficits as defined above and maximize level of functional independence with ADLs and functional mobility  Plan   Treatment Interventions ADL retraining;UE strengthening/ROM; Patient/family training; Activityengagement   Goal Expiration Date 09/24/21   OT Treatment Day 1   Recommendation   Additional Comments 2 The patient's raw score on the AM-PAC Daily Activity inpatient short form is 21, standardized score is 44 27, greater than 39 4  Patients at this level are likely to benefit from discharge to home  Please refer to the recommendation of the Occupational Therapist for safe discharge planning     AM-PAC Daily Activity Inpatient   Lower Body Dressing 3   Bathing 3   Toileting 3   Upper Body Dressing 4   Grooming 4   Eating 4   Daily Activity Raw Score 21   Daily Activity Standardized Score (Calc for Raw Score >=11) 44 27   AM-PAC Applied Cognition Inpatient   Following a Speech/Presentation 4   Understanding Ordinary Conversation 4   Taking Medications 4   Remembering Where Things Are Placed or Put Away 4   Remembering List of 4-5 Errands 4   Taking Care of Complicated Tasks 4   Applied Cognition Raw Score 24   Applied Cognition Standardized Score 62 21   TODD Spence/L

## 2021-09-15 NOTE — ASSESSMENT & PLAN NOTE
· Patient had above-the-knee amputation completed by vascular surgeon Dr Devi Muro  on 1/14/21 due to suspected rhabdomyolysis of lower right extremity  · Continue supportive care  · PT/OT evaluated patient, recommends discharge with home rehab services  · Vascular surgery will follow the patient as an outpatient, to assess further surgical intervention as patient is not systemically ill and has no cellulitis

## 2021-09-15 NOTE — UTILIZATION REVIEW
Inpatient Admission Authorization Request   NOTIFICATION OF INPATIENT ADMISSION/INPATIENT AUTHORIZATION REQUEST   SERVICING FACILITY:   [address] [place] [address]  [facility], [address], [address]  Tax ID: [id number]  NPI: [id number]  [facility]  [address]  Place of Service Code: 24     ATTENDING PROVIDER:  Attending Name and NPI#: Hilaria Young [[id number]]  [facility], [address], [address]  Phone: [phone]     UTILIZATION REVIEW CONTACT:  Barbara Bravo, Utilization   Network Utilization Review Department  Phone: [phone]  Fax [phone]  Email: [email or web address]     PHYSICIAN ADVISORY SERVICES:  FOR GMWV-KO-VUXD REVIEW - MEDICAL NECESSITY DENIAL  Phone: [phone]  Fax: [phone]  Email: [email or web address]     TYPE OF REQUEST:  Inpatient Status     ADMISSION INFORMATION:  ADMISSION DATE/TIME: 9/13/21  3:53 PM  PATIENT DIAGNOSIS CODE/DESCRIPTION:  Osteomyelitis ([address] ) [M86 9]  DISCHARGE DATE/TIME: No discharge date for patient encounter  DISCHARGE DISPOSITION (IF DISCHARGED): Home with Home Health Care     IMPORTANT INFORMATION:  Please contact the Barbara Bravo directly with any questions or concerns regarding this request  Department voicemails are confidential     Send requests for admission clinical reviews, concurrent reviews, approvals, and administrative denials due to lack of clinical to fax [phone]

## 2021-09-15 NOTE — PLAN OF CARE
Problem: OCCUPATIONAL THERAPY ADULT  Goal: Performs self-care activities at highest level of function for planned discharge setting  See evaluation for individualized goals  Description: Treatment Interventions: ADL retraining, Functional transfer training, UE strengthening/ROM, Endurance training, Patient/family training, Compensatory technique education, Activityengagement, Equipment evaluation/education          See flowsheet documentation for full assessment, interventions and recommendations  Outcome: Progressing  Note: Limitation: Decreased ADL status, Decreased UE strength, Decreased endurance, Decreased sensation, Decreased self-care trans, Decreased high-level ADLs  Prognosis: Good  Assessment: Patient participated in Skilled OT session this date with interventions consisting of ADL re training with the use of correct body mechnaics and therapeutic exercise to: increase functional use of BUEs, increase BUE muscle strength    Patient agreeable to OT treatment session, upon arrival patient was found seated at edge of bed (session completed at this position)  Patient requiring occasional rest periods  Patient continues to be functioning below baseline level, occupational performance remains limited secondary to factors listed above and increased risk for falls and injury  From OT standpoint, recommendation at time of d/c would be home with home health rehabilitation  Patient to benefit from continued Occupational Therapy treatment while in the hospital to address deficits as defined above and maximize level of functional independence with ADLs and functional mobility        OT Discharge Recommendation: Home with home health rehabilitation  OT - OK to Discharge: Yes (Once medically cleared )  OBDULIO Martinez

## 2021-09-15 NOTE — INCIDENTAL FINDINGS
The following findings require follow up:  Radiographic finding   Finding: BONES:     Loss of normal fatty marrow signal on T1-weighted sequences distal 2 0 cm of the stump with accompanying low signal intensity centrally on fluid sensitive sequences, suggesting chronic infection and necrosis   Peripheral high signal, most compatible with    granulation tissue   Surrounding periosteal edema      Follow up required: vascular surgery    Follow up should be done within 1   week(s)    Please notify the following clinician to assist with the follow up:   Dr Trav Rahman

## 2021-09-15 NOTE — CONSULTS
Consultation - Infectious Disease   Festus Traylor 59 y o  male MRN: [id number]  Unit/Bed#: -02 Encounter: [id number]      Inpatient consult to Infectious Diseases  Consult performed by: Alyx Anderson MD  Consult ordered by: Corrinne Mcgregor, PA-C            REQUIRED DOCUMENTATION:     1  This service was provided via Telemedicine  2  Provider located at [facility]  3  TeleMed provider: Alyx Anderson MD   4  Identify all parties in room with patient during tele consult:RN  5  After connecting through [facility], patient was identified by name and date of birth and assistant checked wristband  Patient was then informed that this was a Telemedicine visit and that the exam was being conducted confidentially over secure lines  My office door was closed  No one else was in the room  Patient acknowledged consent and understanding of privacy and security of the Telemedicine visit, and gave us permission to have the assistant stay in the room in order to assist with the history and to conduct the exam   I informed the patient that I have reviewed their record in Epic and presented the opportunity for them to ask any questions regarding the visit today  The patient agreed to participate  Assessment/Recommendations     1  Chronic osteomyelitis of distal R AKA stump  - hx R AKA 1/18/21  - Patient has a hx fall and R stump hematoma in May 2021 which may have been source of infection  - diagnosis suspected clinically and on MRI  - microbiology not defined  - no systemic signs of illness    · Recommend surgical debridement and obtaining tissue routine, AFB and fungal culture, may be done as an outpatient once patient has been off antibiotics for 5-7 days  · Recommend discontinuing vancomycin and cefepime as patient is not systemically ill, has no cellulitis and to improve yield of surgical cultures  · Recommend ID consult when admitted to guide antibiotic choice   Anticipate 6 weeks of iv therapy via picc    2  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  - hx R AKA 1/14/21 and s/p R SFA/PFA/pop-tibial embolectomy  - Patient reports ill fitting prosthesis     · Continue supportive care, fu with Vascular surgery for surgical debridement and possible stump revision    3  Type 2 diabetes  - Risk factor for immunosuppression, poor wound healing    · Recommend strict glycemic control to aid with wound healing    4  Hx covid pneumonia, respiratory failure, candidal empyema  - Resolved     5  Hx R leg DVT on chronic anticoagulation    Management per primary team    Thank you for involving me in the care of your patient  Please call with questions, change in clinical status or if tests recommended above are abnormal      Discussed with the primary service  History     Reason for Consult: Osteomyelitis  HPI: Sharif Barillas is a 59y o  year old male with type 2 diabetes  He was admitted in January with covid pneumonia followed right after with an embolic event with RLE ischemia from an aortic thrombus and required urgent R AKA guillotine amputation on 1/14 followed by formal revision on 1/18/21  He also has a hx candidal empyema  He reports that he has had trouble with adjusting his prosthesis and also recalls a fall in May when he landed on his stump and bruised his stump  He presented to the ER on 9/14 with one week of acute onset pain and swelling at the stump site, he had no discharge, redness or open wounds  No fever or chills  An x-ray of the right femur was obtained by his PCP which was concerning for possible osteo and he was referred to the ER  In the ER, vitals were stable   Labs were notable for lactic acidosis without leukocytosis   He was started on vanc and cefepime and an MRI was obtained which was consistent with osteo of the distal stump  No abscess  Blood cultures from admission are negative  He has remained afebrile and pain is unchanged    Denies nausea, vomiting, diarrhea or rash and is tolerating antibiotics well  Infectious disease is being consulted for diagnostic work up and antibiotic management  Review of Systems  Pertinent positives and negatives as noted in HPI  Rest complete 12 point system-based review of systems is otherwise negative  PAST MEDICAL HISTORY:  Past Medical History:   Diagnosis Date    Arthritis     Diabetes mellitus [facility] )     GERD (gastroesophageal reflux disease)     Hypercholesteremia     Hypertension     Ischemia of right lower extremity with suspected rhabdomyolysis 2/6/2021    Left Hydropneumothorax 2/10/2021    [facility]     Pneumonia due to COVID-19 virus 1/11/2021     Past Surgical History:   Procedure Laterality Date    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    Procedure: AMPUTATION ABOVE KNEE (AKA);   Surgeon: Kendra Vora MD;  [facility];  Service: Vascular    AMPUTATION ABOVE KNEE (AKA) Right 1/18/2021    Procedure: AMPUTATION ABOVE KNEE (AKA) FORMALIZATION,  R AKA wound washout, wound closure;  Surgeon: Kendra Vora MD;  [facility];  Service: Vascular    ARTERIOGRAM Right 1/13/2021    Procedure: ARTERIOGRAM;  Surgeon: Ed Friedman DO;  [facility];  Service: Vascular    IR CHEST TUBE PLACEMENT  2/10/2021    IR LOWER EXTREMITY ANGIOGRAM  1/14/2021    THROMBECTOMY W/ EMBOLECTOMY Right 1/13/2021    Procedure: EMBOLECTOMY/THROMBECTOMY LOWER EXTREMITY;  Surgeon: Ed Friedman DO;  [facility];  Service: Vascular       FAMILY HISTORY:  Non-contributory    SOCIAL HISTORY:  Social History     Social History     Substance and Sexual Activity   Alcohol Use Not Currently     Social History     Substance and Sexual Activity   Drug Use No    Comment: methadone     Social History     Tobacco Use   Smoking Status Former Smoker   Smokeless Tobacco Never Used       ALLERGIES:  Allergies   Allergen Reactions    Varenicline        MEDICATIONS:  All current active medications have been reviewed  Physical Exam     Temp:  [97 3 °F (36 3 °C)-97 8 °F (36 6 °C)] 97 8 °F (36 6 °C)  HR:  [67-88] 80  Resp:  [16-18] 18  BP: (106-135)/(60-65) 135/65  SpO2:  [94 %-96 %] 95 %  Temp (24hrs), Av 6 °F (36 4 °C), Min:97 3 °F (36 3 °C), Max:97 8 °F (36 6 °C)  Current: Temperature: 97 8 °F (36 6 °C)    Intake/Output Summary (Last 24 hours) at 9/15/2021 0910  Last data filed at 9/15/2021 0701  Gross per 24 hour   Intake --   Output 1200 ml   Net -1200 ml         Physical exam findings reported by bedside and primary medical team staff    General Appearance:  Appearing chronically ill but nontoxic, and in no distress, appears stated age   Head:  Normocephalic, without obvious abnormality, atraumatic   Eyes:  PERRL, conjunctiva pink and sclera anicteric, both eyes   Nose: Nares normal, mucosa normal, no drainage   Throat: Oropharynx moist without lesions; lips, mucosa, and tongue normal; teeth and gums normal   Neck: Supple, symmetrical, trachea midline, no adenopathy, no tenderness/mass/nodules   Back:   Symmetric, no curvature, ROM normal, no CVA tenderness   Lungs:   Clear to auscultation bilaterally, no audible wheezes, rhonchi and rales, respirations unlabored   Chest Wall:  No tenderness or deformity   Heart:  Regular rate and rhythm, S1, S2 normal, no murmur, rub or gallop   Abdomen:   Soft, non-tender, non-distended, positive bowel sounds, no masses, no organomegaly    No CVA tenderness   Extremities: Hx R AKA, stump site without open wounds, boggy swelling at distal tip   Skin: Skin color, texture, turgor normal, no rashes or lesions  No draining wounds noted     Lymph nodes: Cervical, supraclavicular, and axillary nodes normal   Neurologic: Alert and oriented times 3       Invasive Devices:   Peripheral IV 21 Left Forearm (Active)   Site Assessment WDL 21   Dressing Type Transparent 21   Line Status Flushed;Saline locked 21 Dressing Status Clean;Dry; Intact 09/13/21 2122   Reason Not Rotated Not due 09/13/21 2122       Labs, Imaging, & Other Studies     Lab Results:    I have personally reviewed pertinent labs  Results from last 7 days   Lab Units 09/15/21  0452 09/14/21 0459 09/13/21  1424   WBC Thousand/uL 6 90 8 80 8 10   HEMOGLOBIN g/dL 12 4* 12 4* 12 6*   PLATELETS Thousands/uL 195 214 227     Results from last 7 days   Lab Units 09/15/21  0452   POTASSIUM mmol/L 4 4   CHLORIDE mmol/L 96*   CO2 mmol/L 29   BUN mg/dL 28*   CREATININE mg/dL 0 88   EGFR ml/min/1 73sq m 91   CALCIUM mg/dL 9 8     Results from last 7 days   Lab Units 09/13/21  1429 09/13/21  1424   BLOOD CULTURE  No Growth at 24 hrs  No Growth at 24 hrs  Imaging Studies:   I have personally reviewed pertinent imaging study reports and images in PACS  EKG, Pathology, and Other Studies:   I have personally reviewed pertinent reports  Counseling/Coordination of care: Total 70 minutes communication with the patient via telehealth  Labs, medical tests and imaging studies were independently and extensively reviewed by me as noted above in HPI and old records were obtained and summarized as noted above in HPI  My recommendations were discussed with the patient in detail who verbalized understanding

## 2021-09-15 NOTE — DISCHARGE SUMMARY
[facility]  Discharge- Adela French 1960, 59 y o  male MRN: [id number]  Unit/Bed#: -02 Encounter: [id number]  Primary Care Provider: VERONICA Marie   Date and time admitted to hospital: 9/13/2021  2:11 PM    * Subacute osteomyelitis of right femur ([address] )  Assessment & Plan  · Recent x-ray of right femur shows findings concerning for possible osteomyelitis  · MRI : Loss of normal fatty marrow signal on T1-weighted sequences distal 2 0 cm of the stump with accompanying low signal intensity centrally on fluid sensitive sequences, suggesting chronic infection and necrosis   Peripheral high signal, most compatible with granulation tissue   Surrounding periosteal edema  Findings most compatible with chronic osteomyelitis of the distal stump  · Patient was treated with cefepime and vancomycin  · Orthopedic surgery consulted, as per Ortho patients amputation was done by vascular surgeon, therefore vascular surgery should be consulted with regards for need of surgery  · Vascular surgery was contacted, vascular requesting Infectious Disease consult to determine need for source control  · Infectious disease consult completed, recommendation to discontinue vancomycin and cefepime as patient is not systemically, has no sinus cellulitis and improved surgical cultures  · IV antibiotics were discontinued   · Patient is not in need of immediate source control, and can be seen by vascular surgery on outpatient basis  · ID recommended surgical debridement in obtaining tissue routine, AFP and follow cultures to be completed as outpatient once patient has been on and off of antibiotics for 5-7 days  · Vascular Surgery was made aware of Infectious Disease input- they are agreeable with recommendations and will schedule an outpatient appointment to be seen             S/P AKA (above knee amputation), right [facility])  Assessment & Plan    · Patient had above-the-knee amputation completed by vascular surgeon Dr Niurka Amato  on 1/14/21 due to suspected rhabdomyolysis of lower right extremity  · Continue supportive care  · PT/OT evaluated patient, recommends discharge with home rehab services  · Vascular surgery will follow the patient as an outpatient, to assess further surgical intervention as patient is not systemically ill and has no cellulitis  Type 2 diabetes mellitus, with long-term current use of insulin ([facility])  Assessment & Plan  · Will hold oral diabetic medications for now  · Patient recent blood sugars today were 191, 255, 263,   On 9/14/21   insulin Humalog 4 units was added with meals, and increased insulin Lantus to 20 units at bedtime  · Patient reports that at home he does not follow a diabetic diet and that his sugars normally run in the high 200's  · Patient and family educated on diabetic diet  · Continue insulin sliding scale  · Will continue to monitor blood sugars  · Patient will follow up with PCP for diabetes management upon discharge       Chronic hyponatremia  Assessment & Plan  · Upon admission patient had a sodium level of 131  He was  treated with IVF and sodium level was monitored and redrawn in the AM, sodium had improved to 135  · 9/15/21 Sodium level of 132, corrected sodium of 135  Hyponatremia has resolved  · Per reviewing patient's history and speaking with patient, he seems to have a chronic hyponatremia possibly secondary to volume depletion         Vitamin D deficiency  Assessment & Plan  · Continue vitamin-D supplementation    Essential hypertension  Assessment & Plan  · Blood pressure has remained stable throughout hospitalization  · Continue lisinopril      Medical Problems     Resolved Problems  Date Reviewed: 9/15/2021    None              Discharging Physician / Practitioner: Inocente Taylor PA-C  PCP: Danita Yin  Admission Date:   Admission Orders (From admission, onward)     Ordered        09/13/21 1552  Inpatient Admission  Once                   Discharge Date: 09/15/21    Consultations During Hospital Stay:  · Infectious disease  · Vascular surgery    Procedures Performed:   · None    Significant Findings / Test Results:   MRI LOWER EXTREMITY WITHOUT CONTRAST - right femur     INDICATION:   Rule out osteomyelitis   Status post right AKA   Radiographs concerning for osteomyelitis   Presented with right stump pain   No reported open wound or trauma   History of diabetes  COMPARISON:  9/2/2021 radiographs     TECHNIQUE:  MR examination of the right femur was performed  Pulse Sequences: Coronal T1 nonfat sat and STIR, axial T1 and T2 fat sat, and sagittal T2 fat sat   (Please note the coronal images also include the contralateral lower extremity ) IV contrast   was not given  FINDINGS:     SUBCUTANEOUS TISSUES: Edema of the distal stump, most severe laterally, with free fluid, no fluid collection   No sinus tract or definite ulcer  BONES:     Loss of normal fatty marrow signal on T1-weighted sequences distal 2 0 cm of the stump with accompanying low signal intensity centrally on fluid sensitive sequences, suggesting chronic infection and necrosis   Peripheral high signal, most compatible with    granulation tissue   Surrounding periosteal edema  No fracture or malalignment  No significant hip joint effusions   Femoral heads have normal signal and morphology   Pubic symphysis is within normal limits       Incidental Findings:   · See above    Test Results Pending at Discharge (will require follow up):   · none     Outpatient Tests/ Follow up Requested:  · Follow up with PCP for diabetic control  · Follow up with Vascular Surgery, spoke with Rick Welch who [facility]22[facility]21 at 10:45 am         Reason for Admission:  Subacute osteomyelitis of right femur    Hospital Course:   Bjorn Dancer is a 59 y o  male patient with type 2 diabetes who originally presented to the hospital on 9/13/2021 due to  swelling and pain of right distal stump  Patient is status post right above the knee amputation on January 14, 2021 due to right lower extremity ischemia  This surgery was done by Dr Molly Roldan of vascular surgery  Patient reports that he had a fall in the beginning of May that may be the source of the infection, he also reports to ill-fitting prostatic which causes bruising and pain  A MRI of lower extremity without contrast was completed which showed loss of normal fatty marrow suggesting chronic infection and necrosis  Upon admission patient was started on IV antibiotics of vancomycin and cefepime  He was noted to have hyponatremia which was treated with IVF, per history and past medical records patient has a chronic hyponatremia  Sodium level was corrected to 135, continues to remain stable  Orthopedic surgery to was consulted via phone and they recommended that patient follow-up with vascular surgeon  Vascular surgery was then consulted recommended that if patient is stable to be followed up as an outpatient  Infectious Disease was consulted and recommend discontinuing IV antibiotics as patient is not systemically ill and has no sign of cellulitis and to improve further surgical cultures  Patient is to be discharged and followed with vascular surgery as an outpatient to further evaluate await surgical intervention  This provider spoke with vascular surgeon provider who stated that they will make appointment for follow-up with patient  Anticipated 6 weeks of IV therapy needed  While in the hospital patient's diabetes was monitored with diabetic insulin regiment, sliding scale, and diabetic diet  Patient reports poor compliance with diabetic diet at home, he will follow up with PCP upon discharge for better glycemic control      Condition at Discharge: stable    Discharge Day Visit / Exam:   Subjective:  Patient was seen examined today at bedside    He is not ill appearing and and denies any fever chills nausea vomiting or diarrhea  Patient does still report mild pain and swelling to right distal stump  Patient notes that he was seen by ID and is agreeable to discharge plan as an outpatient with vascular surgery  Vitals: Blood Pressure: 135/65 (09/15/21 0717)  Pulse: 80 (09/15/21 0717)  Temperature: 97 8 °F (36 6 °C) (09/15/21 0717)  Temp Source: Tympanic (09/15/21 0717)  Respirations: 18 (09/15/21 0717)  Height: 5' 8" (172 7 cm) (09/13/21 2104)  Weight - Scale: 81 1 kg (178 lb 12 7 oz) (09/13/21 2104)  SpO2: 95 % (09/15/21 0717)  Exam:   Physical Exam  Constitutional:       General: He is not in acute distress  Appearance: Normal appearance  He is not ill-appearing or diaphoretic  HENT:      Head: Normocephalic and atraumatic  Cardiovascular:      Rate and Rhythm: Normal rate and regular rhythm  Pulses: Normal pulses  Heart sounds: Normal heart sounds  Pulmonary:      Effort: Pulmonary effort is normal  No respiratory distress  Breath sounds: Normal breath sounds  Abdominal:      General: There is no distension  Palpations: Abdomen is soft  Tenderness: There is no abdominal tenderness  Musculoskeletal:         General: Swelling (distal right stump  ) and tenderness present  Right Lower Extremity: Right leg is amputated above knee  Skin:     General: Skin is warm and dry  Neurological:      General: No focal deficit present  Mental Status: He is alert  Psychiatric:         Mood and Affect: Mood normal          Behavior: Behavior normal          Discussion with Family: Updated  (sister) via phone  Discharge instructions/Information to patient and family:   See after visit summary for information provided to patient and family  Provisions for Follow-Up Care:  See after visit summary for information related to follow-up care and any pertinent home health orders         Disposition:   Home with VNA Services (Reminder: Complete face to face encounter) face to face encounter completed regarding necessity  Planned Readmission:  Patient to follow up with vascular surgery for possible surgical debridement and cultures of right distal AKA  Discharge Statement:  I spent 45 minutes discharging the patient  This time was spent on the day of discharge  I had direct contact with the patient on the day of discharge  Greater than 50% of the total time was spent examining patient, answering all patient questions, arranging and discussing plan of care with patient as well as directly providing post-discharge instructions  Additional time then spent on discharge activities  Discharge Medications:  See after visit summary for reconciled discharge medications provided to patient and/or family        **Please Note: This note may have been constructed using a voice recognition system**

## 2021-09-16 ENCOUNTER — TRANSITIONAL CARE MANAGEMENT (OUTPATIENT)
Dept: FAMILY MEDICINE CLINIC | Facility: CLINIC | Age: 61
End: 2021-09-16

## 2021-09-16 DIAGNOSIS — Z71.89 COMPLEX CARE COORDINATION: Primary | ICD-10-CM

## 2021-09-17 ENCOUNTER — PATIENT OUTREACH (OUTPATIENT)
Dept: FAMILY MEDICINE CLINIC | Facility: CLINIC | Age: 61
End: 2021-09-17

## 2021-09-17 ENCOUNTER — OFFICE VISIT (OUTPATIENT)
Dept: PAIN MEDICINE | Facility: CLINIC | Age: 61
End: 2021-09-17
Payer: COMMERCIAL

## 2021-09-17 VITALS
SYSTOLIC BLOOD PRESSURE: 115 MMHG | BODY MASS INDEX: 27.19 KG/M2 | HEIGHT: 68 IN | DIASTOLIC BLOOD PRESSURE: 70 MMHG | HEART RATE: 85 BPM

## 2021-09-17 DIAGNOSIS — E11.42 TYPE 2 DIABETES MELLITUS WITH DIABETIC POLYNEUROPATHY, UNSPECIFIED WHETHER LONG TERM INSULIN USE (HCC): ICD-10-CM

## 2021-09-17 DIAGNOSIS — M54.50 CHRONIC BILATERAL LOW BACK PAIN WITHOUT SCIATICA: ICD-10-CM

## 2021-09-17 DIAGNOSIS — G89.29 CHRONIC BILATERAL LOW BACK PAIN WITHOUT SCIATICA: ICD-10-CM

## 2021-09-17 DIAGNOSIS — G89.4 CHRONIC PAIN SYNDROME: Primary | ICD-10-CM

## 2021-09-17 DIAGNOSIS — M54.12 CERVICAL RADICULOPATHY: ICD-10-CM

## 2021-09-17 PROCEDURE — 99214 OFFICE O/P EST MOD 30 MIN: CPT | Performed by: NURSE PRACTITIONER

## 2021-09-17 NOTE — PROGRESS NOTES
Outpatient Care Management Note:  In basket referral for complex care management received  Chart review completed

## 2021-09-17 NOTE — PROGRESS NOTES
Assessment:  1  Chronic pain syndrome    2  Chronic bilateral low back pain without sciatica    3  Cervical radiculopathy    4  Type 2 diabetes mellitus with diabetic polyneuropathy, unspecified whether long term insulin use ([facility] )        Plan:   While the patient was in the office today, I did have a thorough conversation regarding their chronic pain syndrome, medication management, and treatment plan options  Patient is being seen for follow-up visit  Overall, he reports that his pain remains stable with his current medication regimen which includes Cymbalta 30 mg daily as well as Lyrica 100 mg 3 times daily  Patient was recently hospitalized for osteomyelitis involving the right stump  He was just discharged from the hospital couple days ago  He will be following with his vascular surgeon next week  Continue Cymbalta 30 mg daily to address the neuropathic component of his pain  Continue Lyrica 100 mg 3 times daily to address the neuropathic component of his pain  He did not require refill the there medications during today's visit  The patient will follow-up in 3 months for medication prescription refill and reevaluation  The patient was advised to contact the office should their symptoms worsen in the interim  The patient was agreeable and verbalized an understanding  History of Present Illness: The patient is a 59 y o  male who presents for a follow up office visit in regards to Knee Pain, Leg Pain, and Arm Pain  The patients current symptoms include Complaints of right stump pain, left knee pain, left ankle pain, neck pain  Right upper extremity pain  Current pain level is a 5/10  Quality pain is described as cramping and numb  Current pain medications includes:  Lyrica 100 mg 3 times daily, Cymbalta 30 mg daily    The patient reports that this regimen is providing  30% pain relief  The patient is reporting no side effects from this pain medication regimen      I have personally reviewed and/or updated the patient's past medical history, past surgical history, family history, social history, current medications, allergies, and vital signs today  Review of Systems  Review of Systems   Musculoskeletal: Positive for gait problem and joint swelling (stump)  Decreased range of motion   Pain in extremity- loss right leg      All other systems reviewed and are negative  Past Medical History:   Diagnosis Date    Arthritis     Diabetes mellitus ([facility] )     GERD (gastroesophageal reflux disease)     Hypercholesteremia     Hypertension     Ischemia of right lower extremity with suspected rhabdomyolysis 2/6/2021    Left Hydropneumothorax 2/10/2021    [facility]     Pneumonia due to COVID-19 virus 1/11/2021       Past Surgical History:   Procedure Laterality Date    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    Procedure: AMPUTATION ABOVE KNEE (AKA);   Surgeon: Leonidas Stovall MD;  [facility];  Service: Vascular    AMPUTATION ABOVE KNEE (AKA) Right 1/18/2021    Procedure: AMPUTATION ABOVE KNEE (AKA) FORMALIZATION,  R AKA wound washout, wound closure;  Surgeon: Leonidas Stovall MD;  [facility];  Service: Vascular    ARTERIOGRAM Right 1/13/2021    Procedure: ARTERIOGRAM;  Surgeon: Alvaro Blas DO;  [facility];  Service: Vascular    IR CHEST TUBE PLACEMENT  2/10/2021    IR LOWER EXTREMITY ANGIOGRAM  1/14/2021    THROMBECTOMY W/ EMBOLECTOMY Right 1/13/2021    Procedure: EMBOLECTOMY/THROMBECTOMY LOWER EXTREMITY;  Surgeon: Alvaro Blas DO;  [facility];  Service: Vascular       Family History   Problem Relation Age of Onset    Diabetes Mother     Hypertension Father     Leukemia Father     Acute lymphoblastic leukemia Father     Cancer Sister        Social History     Occupational History    Not on file   Tobacco Use    Smoking status: Former Smoker    Smokeless tobacco: Never Used   Vaping Use    Vaping Use: Never used   Substance and Sexual Activity    Alcohol use: Not Currently    Drug use: No     Comment: methadone    Sexual activity: Not on file         Current Outpatient Medications:     acetaminophen (TYLENOL) 325 mg tablet, Take 3 tablets (975 mg total) by mouth every 8 (eight) hours (Patient taking differently: Take 650 mg by mouth 3 (three) times a day as needed for moderate pain ), Disp: , Rfl: 0    albuterol (PROVENTIL HFA,VENTOLIN HFA) 90 mcg/act inhaler, Inhale 2 puffs every 4 (four) hours as needed for wheezing, Disp: , Rfl: 0    apixaban (ELIQUIS) 5 mg, Take 1 tablet (5 mg total) by mouth 2 (two) times a day, Disp: 60 tablet, Rfl: 3    Blood Glucose Monitoring Suppl (OneTouch Verio) w/Device KIT, Use to test blood sugars 3 times daily, Disp: 1 kit, Rfl: 0    cetirizine (ZyrTEC) 10 mg tablet, Take 10 mg by mouth daily, Disp: , Rfl:     cholecalciferol (VITAMIN D3) 1,000 units tablet, Take 2 tablets (2,000 Units total) by mouth daily, Disp: 12 tablet, Rfl: 0    Diclofenac Sodium (VOLTAREN) 1 %, Apply 4 g topically 4 (four) times a day (Patient taking differently: Apply 4 g topically 4 (four) times a day as needed ), Disp: , Rfl: 0    DULoxetine (CYMBALTA) 30 mg delayed release capsule, Take 1 capsule (30 mg total) by mouth daily at bedtime, Disp: 30 capsule, Rfl: 1    Empagliflozin (Jardiance) 25 MG TABS, Take 1 tablet (25 mg total) by mouth every morning, Disp: 90 tablet, Rfl: 3    fluticasone (FLONASE) 50 mcg/act nasal spray, 1 spray into each nostril daily, Disp: , Rfl:     glucose blood test strip, Use as instructed, Disp: 270 each, Rfl: 3    insulin glargine (LANTUS) 100 units/mL subcutaneous injection, Inject 17 Units under the skin daily at bedtime, Disp: 5 mL, Rfl: 0    Insulin Pen Needle 31G X 5 MM MISC, 30 units sq 2X daily, Disp: 100 each, Rfl: 3    Insulin Pen Needle 31G X 5 MM MISC, Use daily Pt to inject 4 X daily, Disp: 100 each, Rfl: 5    lansoprazole (PREVACID) 30 mg capsule, Take 1 capsule (30 mg total) by mouth daily, Disp: 30 capsule, Rfl: 0    lidocaine (LMX) 4 % cream, Apply topically 4 (four) times a day as needed for mild pain, Disp: 30 g, Rfl: 0    lisinopril (ZESTRIL) 10 mg tablet, Take 1 tablet (10 mg total) by mouth daily, Disp: 30 tablet, Rfl: 0    magnesium oxide (MAG-OX) 400 mg, Take 1 tablet (400 mg total) by mouth 2 (two) times a day, Disp: 60 tablet, Rfl: 0    menthol-methyl salicylate (BENGAY) 12-34 % cream, Apply topically 4 (four) times a day as needed (torticollis), Disp: , Rfl: 0    methadone (DOLOPHINE) 10 mg tablet, Take 70 mg by mouth daily,, Disp: , Rfl:     multivitamin-minerals (CENTRUM ADULTS) tablet, Take 1 tablet by mouth daily, Disp: , Rfl: 0    pregabalin (LYRICA) 100 mg capsule, Take 1 capsule (100 mg total) by mouth 3 (three) times a day, Disp: 90 capsule, Rfl: 2    sitaGLIPtin-metFORMIN (JANUMET)  MG per tablet, Take 1 tablet by mouth 2 (two) times a day with meals, Disp: 60 tablet, Rfl: 0    naloxone (NARCAN) 4 mg/0 1 mL nasal spray, Administer 1 spray into a nostril  If no response after 2-3 minutes, give another dose in the other nostril using a new spray  (Patient not taking: Reported on 5/11/2021), Disp: 1 each, Rfl: 1    Allergies   Allergen Reactions    Varenicline        Physical Exam:    /70   Pulse 85   Ht 5' 8" (1 727 m)   BMI 27 19 kg/m²     Constitutional:normal, well developed, well nourished, alert, in no distress and non-toxic and no overt pain behavior  Eyes:anicteric  HEENT:grossly intact  Neck:supple, symmetric, trachea midline and no masses   Pulmonary:even and unlabored  Cardiovascular:No edema or pitting edema present  Skin:Normal without rashes or lesions and well hydrated  Psychiatric:Mood and affect appropriate  Neurologic:Cranial Nerves II-XII grossly intact  Musculoskeletal:in wheelchair right AKA      Imaging  No orders to display       No orders of the defined types were placed in this encounter

## 2021-09-18 LAB
BACTERIA BLD CULT: NORMAL
BACTERIA BLD CULT: NORMAL

## 2021-09-20 ENCOUNTER — OFFICE VISIT (OUTPATIENT)
Dept: FAMILY MEDICINE CLINIC | Facility: CLINIC | Age: 61
End: 2021-09-20
Payer: COMMERCIAL

## 2021-09-20 VITALS
HEIGHT: 68 IN | SYSTOLIC BLOOD PRESSURE: 110 MMHG | DIASTOLIC BLOOD PRESSURE: 68 MMHG | BODY MASS INDEX: 26.98 KG/M2 | OXYGEN SATURATION: 94 % | WEIGHT: 178 LBS | HEART RATE: 96 BPM | TEMPERATURE: 98 F

## 2021-09-20 DIAGNOSIS — J30.2 SEASONAL ALLERGIES: Primary | ICD-10-CM

## 2021-09-20 DIAGNOSIS — Z79.4 TYPE 2 DIABETES MELLITUS WITH OTHER CIRCULATORY COMPLICATION, WITH LONG-TERM CURRENT USE OF INSULIN (HCC): ICD-10-CM

## 2021-09-20 DIAGNOSIS — E11.42 TYPE 2 DIABETES MELLITUS WITH DIABETIC POLYNEUROPATHY, WITHOUT LONG-TERM CURRENT USE OF INSULIN (HCC): ICD-10-CM

## 2021-09-20 DIAGNOSIS — J84.10 PULMONARY FIBROSIS (HCC): ICD-10-CM

## 2021-09-20 DIAGNOSIS — Z79.4 TYPE 2 DIABETES MELLITUS WITH DIABETIC POLYNEUROPATHY, WITH LONG-TERM CURRENT USE OF INSULIN (HCC): ICD-10-CM

## 2021-09-20 DIAGNOSIS — E11.59 TYPE 2 DIABETES MELLITUS WITH OTHER CIRCULATORY COMPLICATION, WITH LONG-TERM CURRENT USE OF INSULIN (HCC): ICD-10-CM

## 2021-09-20 DIAGNOSIS — E11.42 TYPE 2 DIABETES MELLITUS WITH DIABETIC POLYNEUROPATHY, WITH LONG-TERM CURRENT USE OF INSULIN (HCC): ICD-10-CM

## 2021-09-20 PROCEDURE — 99214 OFFICE O/P EST MOD 30 MIN: CPT | Performed by: NURSE PRACTITIONER

## 2021-09-20 RX ORDER — INSULIN GLARGINE 100 [IU]/ML
20 INJECTION, SOLUTION SUBCUTANEOUS
Qty: 10 ML | Refills: 2 | Status: SHIPPED | OUTPATIENT
Start: 2021-09-20 | End: 2022-01-19

## 2021-09-20 RX ORDER — INSULIN GLARGINE 100 [IU]/ML
20 INJECTION, SOLUTION SUBCUTANEOUS
Qty: 5 ML | Refills: 0 | Status: SHIPPED | OUTPATIENT
Start: 2021-09-20 | End: 2021-09-20

## 2021-09-20 RX ORDER — INSULIN LISPRO 100 [IU]/ML
INJECTION, SOLUTION INTRAVENOUS; SUBCUTANEOUS
Qty: 5 ML | Refills: 10 | Status: SHIPPED | OUTPATIENT
Start: 2021-09-20 | End: 2021-09-20

## 2021-09-20 RX ORDER — FLUTICASONE PROPIONATE 50 MCG
1 SPRAY, SUSPENSION (ML) NASAL DAILY
Qty: 16 G | Refills: 2 | Status: SHIPPED | OUTPATIENT
Start: 2021-09-20

## 2021-09-20 RX ORDER — INSULIN LISPRO 100 [IU]/ML
INJECTION, SOLUTION INTRAVENOUS; SUBCUTANEOUS
Qty: 5 ML | Refills: 10 | Status: SHIPPED | OUTPATIENT
Start: 2021-09-20 | End: 2021-09-21

## 2021-09-20 RX ORDER — LEVOCETIRIZINE DIHYDROCHLORIDE 5 MG/1
5 TABLET, FILM COATED ORAL EVERY EVENING
Qty: 30 TABLET | Refills: 3 | Status: SHIPPED | OUTPATIENT
Start: 2021-09-20 | End: 2022-01-21

## 2021-09-20 NOTE — TELEPHONE ENCOUNTER
Pharmacy called in need of the following re-sent with the follow ing corrections       humalog needs sliding scale directions       lantus is it vials or pens   Vial= 10 ml or pen is 15 ml

## 2021-09-20 NOTE — PROGRESS NOTES
Assessment/Plan:    No problem-specific Assessment & Plan notes found for this encounter  Diagnoses and all orders for this visit:    Seasonal allergies  -     levocetirizine (XYZAL) 5 MG tablet; Take 1 tablet (5 mg total) by mouth every evening  -     fluticasone (FLONASE) 50 mcg/act nasal spray; 1 spray into each nostril daily    Type 2 diabetes mellitus with diabetic polyneuropathy, without long-term current use of insulin ([facility])  Comments:  HgbA1C 11 9 sugar  196 will stop the Humalog and change to Humulin 70/30 30 IU bid with meals and recheck in 1 mos  Orders:  -     Insulin Pen Needle 31G X 5 MM MISC; 30 units sq 2X daily    Type 2 diabetes mellitus with other circulatory complication, with long-term current use of insulin ([facility])  -     Discontinue: glucose blood test strip; Use as instructed    Type 2 diabetes mellitus with diabetic polyneuropathy, with long-term current use of insulin ([facility])  -     Discontinue: Insulin Lispro (HumaLOG) 100 units/mL cartridge for injection; Sliding scale as instructed at mealtime   -     Discontinue: insulin glargine (LANTUS) 100 units/mL subcutaneous injection; Inject 20 Units under the skin daily at bedtime  -     Comprehensive metabolic panel; Future  -     CBC and differential; Future  -     Insulin syringes  -     Insulin Lispro (HumaLOG) 100 units/mL cartridge for injection; Sliding scale as instructed at mealtime  Blood Glucose 150 - 224: 1 Unit of Insulin Blood Glucose 225 - 299: 2 Units of Insulin Blood Glucose 300 - 374: 3 Units of Insulin Blood Glucose 375 - 449: 4 Units of Insulin Blood Glucose greater than or equal to 450: 5 Units of Insulin  -     insulin glargine (LANTUS) 100 units/mL subcutaneous injection; Inject 20 Units under the skin daily at bedtime    Pulmonary fibrosis ([facility] [address] )  -     Ambulatory referral to Pulmonology;  Future          Subjective:      Patient ID: Kwaku Terry is a 59 y o  male             Patient presents for follow-up after transition to home from hospitalization for subacute osteomyelitis of the right femur  Patient had an x-ray concerning for osteomyelitis niacin time to the emergency department this time  Patient had an MRI completed which shows findings most compatible with chronic osteomyelitis of the distal stump  Patient was treated with cefepime and vancomycin in the hospital but then that was stopped as per Infectious Disease recommendations  They stopped it as it was not systemic and he was not placed on antibiotics to go home  Patient had amputation completed by vascular surgery so he was instructed to follow-up with them on an outpatient basis  Patient has an appointment made with them  this Wednesday  Patient continues to have pain in his stump  He is following with pain management  Patient was discharged home from the hospital with rehab services  Patient has been checking his sugar at home and continues to be above 300 in the mornings  Patient is willing to start mealtime insulin  Patient and his nephew educated on mealtime insulin and sliding scale was provided  Will continue with Lantus 20 units at bedtime along with Humalog at bedtime keep track of sugars and bring to next appointment for close monitoring  Call with any episodes of hypoglycemia  Patient had a sodium of 131 when he was admitted to the hospital   He was treated with IVF and sodium improved by time of discharge  Will order repeat CMP for patient to have done before next visit  Patient had chest x-ray completed before hospitalization with evidence of pulmonary fibrosis    Patient knew this diagnosis in the past   Information given for patient for follow-up with pulmonologist      TCM Call (since 8/20/2021)     Date and time call was made  9/16/2021  8:30 AM    Hospital care reviewed  Records reviewed    Patient [facility]ial[facility] at  [address]        Date of Admission  09/13/21    Date of discharge  09/15/21    Diagnosis Subacute osteomyelitis of the right femur    Disposition  Home      TCM Call (since 8/20/2021)     Scheduled for follow up? Yes    Did you obtain your prescribed medications  Yes    Do you need help managing your prescriptions or medications  No    Is transportation to your appointment needed  No    I have advised the patient to call PCP with any new or worsening symptoms  hilda [email or web address]          The following portions of the patient's history were reviewed and updated as appropriate: allergies, current medications, past family history, past medical history, past social history, past surgical history and problem list     Review of Systems      Objective:      /68 (BP Location: Left arm, Patient Position: Sitting)   Pulse 96   Temp 98 °F (36 7 °C) (Tympanic)   Ht 5' 8" (1 727 m)   Wt 80 7 kg (178 lb)   SpO2 94%   BMI 27 06 kg/m²          Physical Exam  Vitals and nursing note reviewed  Constitutional:       General: He is not in acute distress  Appearance: Normal appearance  He is not ill-appearing or toxic-appearing  HENT:      Head: Normocephalic and atraumatic  Right Ear: Tympanic membrane, ear canal and external ear normal  There is no impacted cerumen  Left Ear: Tympanic membrane, ear canal and external ear normal  There is no impacted cerumen  Nose: Congestion present  No rhinorrhea  Mouth/Throat:      Mouth: Mucous membranes are moist       Pharynx: Oropharynx is clear  No oropharyngeal exudate or posterior oropharyngeal erythema  Eyes:      General: No scleral icterus  Right eye: No discharge  Left eye: No discharge  Extraocular Movements: Extraocular movements intact  Conjunctiva/sclera: Conjunctivae normal       Pupils: Pupils are equal, round, and reactive to light  Cardiovascular:      Rate and Rhythm: Normal rate and regular rhythm  Pulses: Normal pulses  Heart sounds: Normal heart sounds  No murmur heard  No friction rub   No gallop  Pulmonary:      Effort: Pulmonary effort is normal  No respiratory distress  Breath sounds: Normal breath sounds  No stridor  No wheezing  Abdominal:      General: Abdomen is flat  Bowel sounds are normal       Palpations: Abdomen is soft  There is no mass  Tenderness: There is no abdominal tenderness  There is no guarding  Musculoskeletal:         General: No swelling or deformity  Normal range of motion  Cervical back: Normal range of motion and neck supple  No rigidity  No muscular tenderness  Right lower leg: No edema  Left lower leg: No edema  Skin:     General: Skin is warm and dry  Capillary Refill: Capillary refill takes less than 2 seconds  Coloration: Skin is not jaundiced or pale  Findings: No bruising  Neurological:      General: No focal deficit present  Mental Status: He is alert and oriented to person, place, and time  Mental status is at baseline  Motor: No weakness  Gait: Gait normal    Psychiatric:         Mood and Affect: Mood normal          Behavior: Behavior normal          Thought Content:  Thought content normal          Judgment: Judgment normal

## 2021-09-20 NOTE — PATIENT INSTRUCTIONS
Blood Glucose 150 - 224: 1 Unit of Insulin   Blood Glucose 225 - 299: 2 Units of Insulin   Blood Glucose 300 - 374: 3 Units of Insulin   Blood Glucose 375 - 449: 4 Units of Insulin   Blood Glucose greater than or equal to 450: 5 Units of Insulin

## 2021-09-21 ENCOUNTER — PATIENT OUTREACH (OUTPATIENT)
Dept: FAMILY MEDICINE CLINIC | Facility: CLINIC | Age: 61
End: 2021-09-21

## 2021-09-21 NOTE — PROGRESS NOTES
Outpatient Care Management Note:  Outreach call placed to [facility]  Spoke with his nephew, [facility], who handles all of Jalen's medical needs  Introduced myself and my role  He is not interested in outreach at this time  [facility] has completed his TCM appointment and is scheduled to see vascular surgery on 9/22/2021  Discussed Jalen's elevated A1C  Ana Haque is able to verbalize the changes that were made to Jalen's insulin  Ana Haque denies the need for any education at this time  Advised him that I can be reached through his PCP's office should he change his mind or have future needs

## 2021-09-22 ENCOUNTER — OFFICE VISIT (OUTPATIENT)
Dept: VASCULAR SURGERY | Facility: CLINIC | Age: 61
End: 2021-09-22
Payer: COMMERCIAL

## 2021-09-22 VITALS
SYSTOLIC BLOOD PRESSURE: 138 MMHG | TEMPERATURE: 98 F | HEIGHT: 68 IN | BODY MASS INDEX: 26.98 KG/M2 | DIASTOLIC BLOOD PRESSURE: 68 MMHG | HEART RATE: 95 BPM | WEIGHT: 178 LBS

## 2021-09-22 DIAGNOSIS — S78.111A ABOVE-KNEE AMPUTATION OF RIGHT LOWER EXTREMITY (HCC): Primary | ICD-10-CM

## 2021-09-22 DIAGNOSIS — L98.491 CALLOUS ULCER, LIMITED TO BREAKDOWN OF SKIN (HCC): ICD-10-CM

## 2021-09-22 DIAGNOSIS — Z89.611 S/P AKA (ABOVE KNEE AMPUTATION), RIGHT (HCC): ICD-10-CM

## 2021-09-22 DIAGNOSIS — F32.9 REACTIVE DEPRESSION: ICD-10-CM

## 2021-09-22 DIAGNOSIS — M86.251 SUBACUTE OSTEOMYELITIS OF RIGHT FEMUR (HCC): ICD-10-CM

## 2021-09-22 PROBLEM — F32.A DEPRESSION: Status: ACTIVE | Noted: 2021-09-22

## 2021-09-22 PROCEDURE — 99212 OFFICE O/P EST SF 10 MIN: CPT | Performed by: SURGERY

## 2021-09-22 NOTE — PROGRESS NOTES
Assessment/Plan:    Callous ulcer, limited to breakdown of skin ([address] )  Left heel , no ulcer but just sensitive skin  Use good moisturizer  Offloading with pillow under calf at night  He has foot drop on left foot which is contributing factor  He will get getting prosthetic fitting  Above-knee amputation of right lower extremity ([facility])  I reviewed the MRI of the right femur, I do not agree that this is osteomyelitis, it appears like bone wax that was used during the procedure for hemostasis  Thus I do not recommend any long term antibiotics  He is sytemically well and stump is not swollen or tender  There is some muscle edema in rectus muscle, likely due to fall and trauma  Subacute osteomyelitis of right femur ([address] )  I reviewed the MRI of the right femur, I do not agree that this is osteomyelitis, it appears like bone wax that was used during the procedure for hemostasis  Thus I do not recommend any long term antibiotics  He is sytemically well and stump is not swollen or tender  There is some muscle edema in rectus muscle, likely due to fall and trauma  Trying to get a sample of the bone is high risk due to reopening the wound and chance of wound healing issues and chronic problems related to that  Diagnoses and all orders for this visit:    Above-knee amputation of right lower extremity ([address] )  -     Prosthetic    Callous ulcer, limited to breakdown of skin ([address] )    Reactive depression    Subacute osteomyelitis of right femur ([facility])    S/P AKA (above knee amputation), right ([facility])          Subjective:      Patient ID: Lee Ann Heart is a 59 y o  male  Patient is an ED f/u on 9/13/21 for swelling/ pain in the R AKA  Patient is s/p R AKA done on 1/14/21  Patient reports swelling and pain to the touch on stump site  Patient has a new wound on LLE heel wound  HPI  Follow up after recent ED admission for pain in left AKA  He had fall and pain in AKA  He has left leg cramps    He also has phantom pain in the right leg  Left heel sensitive skin  Left foot drop  The following portions of the patient's history were reviewed and updated as appropriate: allergies, current medications, past family history, past medical history, past social history, past surgical history and problem list     Review of Systems   Constitutional: Negative  HENT: Negative  Eyes: Negative  Respiratory: Negative  Cardiovascular: Positive for leg swelling (R AKA site)  Gastrointestinal: Negative  Endocrine: Negative  Genitourinary: Negative  Musculoskeletal: Negative  Skin: Positive for wound (L heel wound)  Allergic/Immunologic: Negative  Neurological: Negative  Hematological: Negative  Psychiatric/Behavioral: Negative  I have reviewed the ROS as entered and made changes as necessary  Objective:      /68 (BP Location: Left arm, Patient Position: Sitting, Cuff Size: Standard)   Pulse 95   Temp 98 °F (36 7 °C) (Tympanic)   Ht 5' 8" (1 727 m)   Wt 80 7 kg (178 lb)   BMI 27 06 kg/m²          Physical Exam  Vitals and nursing note reviewed  HENT:      Head: Normocephalic and atraumatic  Cardiovascular:      Rate and Rhythm: Normal rate and regular rhythm  Comments: Triphasic DP and PT on left foot  Musculoskeletal:      Comments: Right AKA stump is well healed, no evidence of infection/ cellulitis  Skin:     Capillary Refill: Capillary refill takes less than 2 seconds  Neurological:      General: No focal deficit present  Mental Status: He is oriented to person, place, and time

## 2021-09-22 NOTE — PATIENT INSTRUCTIONS
Callous ulcer, limited to breakdown of skin ([facility] )  Left heel , no ulcer but just sensitive skin  Use good moisturizer  Offloading with pillow under calf at night  He has foot drop on left foot which is contributing factor  He will need ajusting of prosthetic fitting  Above-knee amputation of right lower extremity ([facility])  I reviewed the MRI of the right femur, I do not agree that this is osteomyelitis, it appears like bone wax that was used during the procedure for hemostasis  Thus I do not recommend any long term antibiotics  He is sytemically well and stump is not swollen or tender  There is some muscle edema in rectus muscle, likely due to fall and trauma  Subacute osteomyelitis of right femur ([facility] [address] )  I reviewed the MRI of the right femur, I do not agree that this is osteomyelitis, it appears like bone wax that was used during the procedure for hemostasis  Thus I do not recommend any long term antibiotics  He is sytemically well and stump is not swollen or tender  There is some muscle edema in rectus muscle, likely due to fall and trauma

## 2021-09-22 NOTE — ASSESSMENT & PLAN NOTE
Left heel , no ulcer but just sensitive skin  Use good moisturizer  Offloading with pillow under calf at night  He has foot drop on left foot which is contributing factor  He will get getting prosthetic fitting

## 2021-09-22 NOTE — ASSESSMENT & PLAN NOTE
I reviewed the MRI of the right femur, I do not agree that this is osteomyelitis, it appears like bone wax that was used during the procedure for hemostasis  Thus I do not recommend any long term antibiotics  He is sytemically well and stump is not swollen or tender  There is some muscle edema in rectus muscle, likely due to fall and trauma

## 2021-09-22 NOTE — ASSESSMENT & PLAN NOTE
I reviewed the MRI of the right femur, I do not agree that this is osteomyelitis, it appears like bone wax that was used during the procedure for hemostasis  Thus I do not recommend any long term antibiotics  He is sytemically well and stump is not swollen or tender  There is some muscle edema in rectus muscle, likely due to fall and trauma  Trying to get a sample of the bone is high risk due to reopening the wound and chance of wound healing issues and chronic problems related to that

## 2021-09-22 NOTE — UTILIZATION REVIEW
Notification of Discharge   This is a Notification of Discharge from our facility [address]  Please be advised that this patient has been discharge from our facility  Below you will find the admission and discharge date and time including the patients disposition  UTILIZATION REVIEW CONTACT:  Cy Benz  Utilization   Network Utilization Review Department  Phone: [phone] x carefully listen to the prompts  All voicemails are confidential   Email: [email or web address]     PHYSICIAN ADVISORY SERVICES:  FOR [id number] REVIEW - MEDICAL NECESSITY DENIAL  Phone: [phone]  Fax: [phone]  Email: [email or web address]     PRESENTATION DATE: 9/13/2021  2:11 PM  OBERVATION ADMISSION DATE:   INPATIENT ADMISSION DATE: 9/13/21  3:53 PM   DISCHARGE DATE: 9/15/2021  3:00 PM  DISPOSITION: Home with [place] with [address] [place] [address] Road INFORMATION:  Send all requests for admission clinical reviews, approved or denied determinations and any other requests to dedicated fax number below belonging to the campus where the patient is receiving treatment   List of dedicated fax numbers:  [address] DENIALS (Administrative/Medical Necessity) [phone]   [address] [place] [address] (Maternity/NICU/Pediatrics) [phone]   Kushal Bear [phone]   [facility] [phone]   Arnulfo Minors [phone]   [address] [phone]   [facility]  [phone]   [address] [place] [address], [facility]  [address] [facility] 1000 [facility] [phone]

## 2021-09-30 ENCOUNTER — OFFICE VISIT (OUTPATIENT)
Dept: FAMILY MEDICINE CLINIC | Facility: CLINIC | Age: 61
End: 2021-09-30
Payer: COMMERCIAL

## 2021-09-30 VITALS
OXYGEN SATURATION: 98 % | DIASTOLIC BLOOD PRESSURE: 72 MMHG | TEMPERATURE: 97.4 F | HEART RATE: 84 BPM | BODY MASS INDEX: 27.06 KG/M2 | HEIGHT: 68 IN | SYSTOLIC BLOOD PRESSURE: 112 MMHG

## 2021-09-30 DIAGNOSIS — E11.42 TYPE 2 DIABETES MELLITUS WITH DIABETIC POLYNEUROPATHY, WITH LONG-TERM CURRENT USE OF INSULIN (HCC): Primary | ICD-10-CM

## 2021-09-30 DIAGNOSIS — Z79.4 TYPE 2 DIABETES MELLITUS WITH DIABETIC POLYNEUROPATHY, WITH LONG-TERM CURRENT USE OF INSULIN (HCC): Primary | ICD-10-CM

## 2021-09-30 PROCEDURE — 99213 OFFICE O/P EST LOW 20 MIN: CPT | Performed by: NURSE PRACTITIONER

## 2021-09-30 NOTE — PROGRESS NOTES
Assessment/Plan:    No problem-specific Assessment & Plan notes found for this encounter  Diagnoses and all orders for this visit:    Type 2 diabetes mellitus with diabetic polyneuropathy, with long-term current use of insulin ([facility] )  -     Ambulatory referral to Diabetic Education; Future  -     Insulin syringes          Subjective:      Patient ID: Sacha Macias is a 59 y o  male  Patient presents to review insulin regimen  Patient states that he has been only needing to administer 1-2 units at meal time based on his sugars  He is not administering any in the morning with breakfast as he eats a small breakfast (bowl of life cereal- instructed to sty away from high carbohydrates)  However, sugar is still running 300 in the AM- instructed to administer insulin in the morning with breakfast based on scale provided  Patient is willing to go to diabetic educator for more information on how to be eating to help reduce sugars  Education has been provided to patient in office on sugars and carbohydrates but he would benefit from more extensive education  Does have a wound on the left heel- is being treated for by vascular and home health nurses  The following portions of the patient's history were reviewed and updated as appropriate: allergies, current medications, past family history, past medical history, past social history, past surgical history and problem list     Review of Systems   Constitutional: Negative for activity change, appetite change, diaphoresis, fatigue, fever and unexpected weight change  HENT: Negative for congestion, mouth sores, rhinorrhea, sinus pain, trouble swallowing and voice change  Eyes: Negative for photophobia and visual disturbance  Respiratory: Negative for apnea, cough, chest tightness, shortness of breath and wheezing  Cardiovascular: Negative for chest pain, palpitations and leg swelling     Gastrointestinal: Negative for abdominal distention, abdominal pain, blood in stool, constipation, diarrhea, nausea and vomiting  Endocrine: Negative for cold intolerance, heat intolerance, polydipsia, polyphagia and polyuria  Genitourinary: Negative for decreased urine volume, difficulty urinating, frequency and urgency  Musculoskeletal: Negative for arthralgias, myalgias, neck pain and neck stiffness  Skin: Positive for wound  Negative for color change and rash  Neurological: Negative for dizziness, weakness, light-headedness, numbness and headaches  Hematological: Negative for adenopathy  Does not bruise/bleed easily  Psychiatric/Behavioral: Negative for self-injury, sleep disturbance and suicidal ideas  The patient is not nervous/anxious  Objective:      /72 (BP Location: Left arm, Patient Position: Sitting, Cuff Size: Standard)   Pulse 84   Temp (!) 97 4 °F (36 3 °C) (Tympanic)   Ht 5' 8" (1 727 m)   SpO2 98%   BMI 27 06 kg/m²          Physical Exam  Vitals and nursing note reviewed  Constitutional:       General: He is not in acute distress  Appearance: Normal appearance  He is not ill-appearing or toxic-appearing  Cardiovascular:      Rate and Rhythm: Normal rate and regular rhythm  Pulses: Normal pulses  Heart sounds: Normal heart sounds  No murmur heard  No friction rub  No gallop  Pulmonary:      Effort: Pulmonary effort is normal  No respiratory distress  Breath sounds: Normal breath sounds  No wheezing, rhonchi or rales  Abdominal:      General: Abdomen is flat  Bowel sounds are normal  There is no distension  Palpations: Abdomen is soft  Tenderness: There is no abdominal tenderness  There is no guarding  Hernia: No hernia is present  Musculoskeletal:      Comments: Right ANNALEE, stump small amount of swelling (overall improvement in swelling) wound right heel with dressing on -  Clean, dry and intact  Skin:     General: Skin is warm  Coloration: Skin is not jaundiced or pale  Findings: No bruising or lesion  Neurological:      General: No focal deficit present  Mental Status: He is alert and oriented to person, place, and time  Mental status is at baseline  Sensory: No sensory deficit  Motor: No weakness  Gait: Gait normal    Psychiatric:         Mood and Affect: Mood normal          Behavior: Behavior normal          Thought Content:  Thought content normal          Judgment: Judgment normal

## 2021-10-12 LAB
LEFT EYE DIABETIC RETINOPATHY: NORMAL
RIGHT EYE DIABETIC RETINOPATHY: NORMAL

## 2021-11-03 ENCOUNTER — OFFICE VISIT (OUTPATIENT)
Dept: VASCULAR SURGERY | Facility: CLINIC | Age: 61
End: 2021-11-03
Payer: COMMERCIAL

## 2021-11-03 VITALS
RESPIRATION RATE: 18 BRPM | DIASTOLIC BLOOD PRESSURE: 84 MMHG | HEIGHT: 68 IN | TEMPERATURE: 98.9 F | SYSTOLIC BLOOD PRESSURE: 122 MMHG | HEART RATE: 99 BPM | BODY MASS INDEX: 27.06 KG/M2

## 2021-11-03 DIAGNOSIS — I74.10 AORTIC THROMBUS (HCC): ICD-10-CM

## 2021-11-03 DIAGNOSIS — L97.421 SKIN ULCER OF LEFT HEEL, LIMITED TO BREAKDOWN OF SKIN (HCC): Primary | ICD-10-CM

## 2021-11-03 DIAGNOSIS — Z89.611 S/P AKA (ABOVE KNEE AMPUTATION), RIGHT (HCC): ICD-10-CM

## 2021-11-03 DIAGNOSIS — S78.111A ABOVE-KNEE AMPUTATION OF RIGHT LOWER EXTREMITY (HCC): ICD-10-CM

## 2021-11-03 PROCEDURE — 99212 OFFICE O/P EST SF 10 MIN: CPT | Performed by: SURGERY

## 2021-11-04 ENCOUNTER — APPOINTMENT (OUTPATIENT)
Dept: WOUND CARE | Facility: CLINIC | Age: 61
End: 2021-11-04
Payer: COMMERCIAL

## 2021-11-04 ENCOUNTER — OFFICE VISIT (OUTPATIENT)
Dept: WOUND CARE | Facility: CLINIC | Age: 61
End: 2021-11-04
Payer: COMMERCIAL

## 2021-11-04 ENCOUNTER — CONSULT (OUTPATIENT)
Dept: PULMONOLOGY | Facility: CLINIC | Age: 61
End: 2021-11-04
Payer: COMMERCIAL

## 2021-11-04 VITALS
RESPIRATION RATE: 18 BRPM | DIASTOLIC BLOOD PRESSURE: 72 MMHG | TEMPERATURE: 97.3 F | HEART RATE: 82 BPM | SYSTOLIC BLOOD PRESSURE: 133 MMHG

## 2021-11-04 VITALS
DIASTOLIC BLOOD PRESSURE: 69 MMHG | HEART RATE: 75 BPM | SYSTOLIC BLOOD PRESSURE: 115 MMHG | OXYGEN SATURATION: 95 % | TEMPERATURE: 98.9 F

## 2021-11-04 DIAGNOSIS — J84.10 PULMONARY FIBROSIS (HCC): ICD-10-CM

## 2021-11-04 DIAGNOSIS — Z23 NEED FOR INFLUENZA VACCINATION: ICD-10-CM

## 2021-11-04 DIAGNOSIS — E08.621 DIABETIC ULCER OF LEFT HEEL ASSOCIATED WITH DIABETES MELLITUS DUE TO UNDERLYING CONDITION, LIMITED TO BREAKDOWN OF SKIN (HCC): Primary | ICD-10-CM

## 2021-11-04 DIAGNOSIS — J86.9 EMPYEMA LUNG (HCC): ICD-10-CM

## 2021-11-04 DIAGNOSIS — L97.421 DIABETIC ULCER OF LEFT HEEL ASSOCIATED WITH DIABETES MELLITUS DUE TO UNDERLYING CONDITION, LIMITED TO BREAKDOWN OF SKIN (HCC): Primary | ICD-10-CM

## 2021-11-04 DIAGNOSIS — Z86.16 HISTORY OF COVID-19: ICD-10-CM

## 2021-11-04 DIAGNOSIS — Z87.09 HISTORY OF EMPYEMA OF PLEURA: Primary | ICD-10-CM

## 2021-11-04 PROCEDURE — 97597 DBRDMT OPN WND 1ST 20 CM/<: CPT | Performed by: SPECIALIST

## 2021-11-04 PROCEDURE — 90682 RIV4 VACC RECOMBINANT DNA IM: CPT

## 2021-11-04 PROCEDURE — 90471 IMMUNIZATION ADMIN: CPT

## 2021-11-04 PROCEDURE — 99244 OFF/OP CNSLTJ NEW/EST MOD 40: CPT | Performed by: INTERNAL MEDICINE

## 2021-11-04 PROCEDURE — 99213 OFFICE O/P EST LOW 20 MIN: CPT | Performed by: SPECIALIST

## 2021-11-04 RX ORDER — LIDOCAINE 40 MG/G
CREAM TOPICAL ONCE
Status: COMPLETED | OUTPATIENT
Start: 2021-11-04 | End: 2021-11-04

## 2021-11-04 RX ADMIN — LIDOCAINE: 40 CREAM TOPICAL at 13:33

## 2021-11-08 ENCOUNTER — OFFICE VISIT (OUTPATIENT)
Dept: WOUND CARE | Facility: CLINIC | Age: 61
End: 2021-11-08
Payer: COMMERCIAL

## 2021-11-08 VITALS
HEART RATE: 94 BPM | SYSTOLIC BLOOD PRESSURE: 106 MMHG | RESPIRATION RATE: 20 BRPM | DIASTOLIC BLOOD PRESSURE: 61 MMHG | TEMPERATURE: 98.9 F

## 2021-11-08 DIAGNOSIS — E08.621 DIABETIC ULCER OF LEFT HEEL ASSOCIATED WITH DIABETES MELLITUS DUE TO UNDERLYING CONDITION, LIMITED TO BREAKDOWN OF SKIN (HCC): Primary | ICD-10-CM

## 2021-11-08 DIAGNOSIS — L97.421 DIABETIC ULCER OF LEFT HEEL ASSOCIATED WITH DIABETES MELLITUS DUE TO UNDERLYING CONDITION, LIMITED TO BREAKDOWN OF SKIN (HCC): Primary | ICD-10-CM

## 2021-11-08 PROCEDURE — 99213 OFFICE O/P EST LOW 20 MIN: CPT | Performed by: FAMILY MEDICINE

## 2021-11-08 RX ORDER — LIDOCAINE 40 MG/G
CREAM TOPICAL ONCE
Status: COMPLETED | OUTPATIENT
Start: 2021-11-08 | End: 2021-11-08

## 2021-11-08 RX ADMIN — LIDOCAINE: 40 CREAM TOPICAL at 15:40

## 2021-11-11 ENCOUNTER — HOSPITAL ENCOUNTER (OUTPATIENT)
Dept: NON INVASIVE DIAGNOSTICS | Facility: HOSPITAL | Age: 61
Discharge: HOME | End: 2021-11-11
Attending: SURGERY
Payer: COMMERCIAL

## 2021-11-11 DIAGNOSIS — L97.421 SKIN ULCER OF LEFT HEEL, LIMITED TO BREAKDOWN OF SKIN (HCC): ICD-10-CM

## 2021-11-11 PROCEDURE — 93926 LOWER EXTREMITY STUDY: CPT

## 2021-11-12 PROCEDURE — 93926 LOWER EXTREMITY STUDY: CPT | Performed by: SURGERY

## 2021-11-12 PROCEDURE — 93922 UPR/L XTREMITY ART 2 LEVELS: CPT | Performed by: SURGERY

## 2021-11-16 ENCOUNTER — HOSPITAL ENCOUNTER (OUTPATIENT)
Dept: CT IMAGING | Facility: HOSPITAL | Age: 61
Discharge: HOME | End: 2021-11-16
Attending: INTERNAL MEDICINE
Payer: COMMERCIAL

## 2021-11-16 DIAGNOSIS — Z86.16 HISTORY OF COVID-19: ICD-10-CM

## 2021-11-16 DIAGNOSIS — Z87.09 HISTORY OF EMPYEMA OF PLEURA: ICD-10-CM

## 2021-11-16 DIAGNOSIS — J84.10 PULMONARY FIBROSIS (HCC): ICD-10-CM

## 2021-11-16 PROCEDURE — 71250 CT THORAX DX C-: CPT

## 2021-11-16 PROCEDURE — G1004 CDSM NDSC: HCPCS

## 2021-11-18 ENCOUNTER — OFFICE VISIT (OUTPATIENT)
Dept: WOUND CARE | Facility: CLINIC | Age: 61
End: 2021-11-18
Payer: COMMERCIAL

## 2021-11-18 VITALS
TEMPERATURE: 97.7 F | RESPIRATION RATE: 18 BRPM | SYSTOLIC BLOOD PRESSURE: 106 MMHG | DIASTOLIC BLOOD PRESSURE: 81 MMHG | HEART RATE: 79 BPM

## 2021-11-18 DIAGNOSIS — E08.621 DIABETIC ULCER OF LEFT HEEL ASSOCIATED WITH DIABETES MELLITUS DUE TO UNDERLYING CONDITION, LIMITED TO BREAKDOWN OF SKIN (HCC): Primary | ICD-10-CM

## 2021-11-18 DIAGNOSIS — L97.421 DIABETIC ULCER OF LEFT HEEL ASSOCIATED WITH DIABETES MELLITUS DUE TO UNDERLYING CONDITION, LIMITED TO BREAKDOWN OF SKIN (HCC): Primary | ICD-10-CM

## 2021-11-18 PROCEDURE — 97597 DBRDMT OPN WND 1ST 20 CM/<: CPT | Performed by: SPECIALIST

## 2021-11-29 DIAGNOSIS — E11.42 TYPE 2 DIABETES MELLITUS WITH DIABETIC POLYNEUROPATHY, UNSPECIFIED WHETHER LONG TERM INSULIN USE (HCC): ICD-10-CM

## 2021-11-29 RX ORDER — PREGABALIN 100 MG/1
CAPSULE ORAL
Qty: 90 CAPSULE | Refills: 0 | Status: SHIPPED | OUTPATIENT
Start: 2021-11-29 | End: 2021-12-27

## 2021-11-30 LAB
LEFT EYE DIABETIC RETINOPATHY: NORMAL
RIGHT EYE DIABETIC RETINOPATHY: NORMAL
SEVERITY (EYE EXAM): NORMAL

## 2021-12-01 ENCOUNTER — APPOINTMENT (OUTPATIENT)
Dept: LAB | Facility: CLINIC | Age: 61
End: 2021-12-01
Payer: COMMERCIAL

## 2021-12-01 DIAGNOSIS — Z79.4 TYPE 2 DIABETES MELLITUS WITH OTHER CIRCULATORY COMPLICATION, WITH LONG-TERM CURRENT USE OF INSULIN (HCC): ICD-10-CM

## 2021-12-01 DIAGNOSIS — Z79.4 TYPE 2 DIABETES MELLITUS WITH DIABETIC POLYNEUROPATHY, WITH LONG-TERM CURRENT USE OF INSULIN (HCC): ICD-10-CM

## 2021-12-01 DIAGNOSIS — E11.59 TYPE 2 DIABETES MELLITUS WITH OTHER CIRCULATORY COMPLICATION, WITH LONG-TERM CURRENT USE OF INSULIN (HCC): ICD-10-CM

## 2021-12-01 DIAGNOSIS — E11.42 TYPE 2 DIABETES MELLITUS WITH DIABETIC POLYNEUROPATHY, WITH LONG-TERM CURRENT USE OF INSULIN (HCC): ICD-10-CM

## 2021-12-01 LAB
ALBUMIN SERPL BCP-MCNC: 3.3 G/DL (ref 3.5–5)
ALP SERPL-CCNC: 143 U/L (ref 46–116)
ALT SERPL W P-5'-P-CCNC: 29 U/L (ref 12–78)
ANION GAP SERPL CALCULATED.3IONS-SCNC: 4 MMOL/L (ref 4–13)
AST SERPL W P-5'-P-CCNC: 18 U/L (ref 5–45)
BASOPHILS # BLD AUTO: 0.08 THOUSANDS/ΜL (ref 0–0.1)
BASOPHILS NFR BLD AUTO: 1 % (ref 0–1)
BILIRUB SERPL-MCNC: 0.77 MG/DL (ref 0.2–1)
BUN SERPL-MCNC: 28 MG/DL (ref 5–25)
CALCIUM ALBUM COR SERPL-MCNC: 10.3 MG/DL (ref 8.3–10.1)
CALCIUM SERPL-MCNC: 9.7 MG/DL (ref 8.3–10.1)
CHLORIDE SERPL-SCNC: 100 MMOL/L (ref 100–108)
CO2 SERPL-SCNC: 30 MMOL/L (ref 21–32)
CREAT SERPL-MCNC: 1.31 MG/DL (ref 0.6–1.3)
EOSINOPHIL # BLD AUTO: 0.19 THOUSAND/ΜL (ref 0–0.61)
EOSINOPHIL NFR BLD AUTO: 3 % (ref 0–6)
ERYTHROCYTE [DISTWIDTH] IN BLOOD BY AUTOMATED COUNT: 13.7 % (ref 11.6–15.1)
EST. AVERAGE GLUCOSE BLD GHB EST-MCNC: 209 MG/DL
GFR SERPL CREATININE-BSD FRML MDRD: 58 ML/MIN/1.73SQ M
GLUCOSE P FAST SERPL-MCNC: 305 MG/DL (ref 65–99)
HBA1C MFR BLD: 8.9 %
HCT VFR BLD AUTO: 42.8 % (ref 36.5–49.3)
HGB BLD-MCNC: 13.3 G/DL (ref 12–17)
IMM GRANULOCYTES # BLD AUTO: 0.02 THOUSAND/UL (ref 0–0.2)
IMM GRANULOCYTES NFR BLD AUTO: 0 % (ref 0–2)
LYMPHOCYTES # BLD AUTO: 2 THOUSANDS/ΜL (ref 0.6–4.47)
LYMPHOCYTES NFR BLD AUTO: 31 % (ref 14–44)
MCH RBC QN AUTO: 26.8 PG (ref 26.8–34.3)
MCHC RBC AUTO-ENTMCNC: 31.1 G/DL (ref 31.4–37.4)
MCV RBC AUTO: 86 FL (ref 82–98)
MONOCYTES # BLD AUTO: 0.67 THOUSAND/ΜL (ref 0.17–1.22)
MONOCYTES NFR BLD AUTO: 11 % (ref 4–12)
NEUTROPHILS # BLD AUTO: 3.45 THOUSANDS/ΜL (ref 1.85–7.62)
NEUTS SEG NFR BLD AUTO: 54 % (ref 43–75)
NRBC BLD AUTO-RTO: 0 /100 WBCS
PLATELET # BLD AUTO: 171 THOUSANDS/UL (ref 149–390)
PMV BLD AUTO: 12 FL (ref 8.9–12.7)
POTASSIUM SERPL-SCNC: 5.1 MMOL/L (ref 3.5–5.3)
PROT SERPL-MCNC: 8.7 G/DL (ref 6.4–8.2)
RBC # BLD AUTO: 4.96 MILLION/UL (ref 3.88–5.62)
SODIUM SERPL-SCNC: 134 MMOL/L (ref 136–145)
WBC # BLD AUTO: 6.41 THOUSAND/UL (ref 4.31–10.16)

## 2021-12-01 PROCEDURE — 83036 HEMOGLOBIN GLYCOSYLATED A1C: CPT

## 2021-12-01 PROCEDURE — 36415 COLL VENOUS BLD VENIPUNCTURE: CPT

## 2021-12-01 PROCEDURE — 3052F HG A1C>EQUAL 8.0%<EQUAL 9.0%: CPT | Performed by: NURSE PRACTITIONER

## 2021-12-01 PROCEDURE — 80053 COMPREHEN METABOLIC PANEL: CPT

## 2021-12-01 PROCEDURE — 85025 COMPLETE CBC W/AUTO DIFF WBC: CPT

## 2021-12-02 ENCOUNTER — OFFICE VISIT (OUTPATIENT)
Dept: WOUND CARE | Facility: CLINIC | Age: 61
End: 2021-12-02
Payer: COMMERCIAL

## 2021-12-02 ENCOUNTER — TELEPHONE (OUTPATIENT)
Dept: FAMILY MEDICINE CLINIC | Facility: CLINIC | Age: 61
End: 2021-12-02

## 2021-12-02 VITALS
HEART RATE: 90 BPM | SYSTOLIC BLOOD PRESSURE: 152 MMHG | DIASTOLIC BLOOD PRESSURE: 105 MMHG | TEMPERATURE: 97.6 F | RESPIRATION RATE: 18 BRPM

## 2021-12-02 DIAGNOSIS — L97.421 DIABETIC ULCER OF LEFT HEEL ASSOCIATED WITH DIABETES MELLITUS DUE TO UNDERLYING CONDITION, LIMITED TO BREAKDOWN OF SKIN (HCC): Primary | ICD-10-CM

## 2021-12-02 DIAGNOSIS — E08.621 DIABETIC ULCER OF LEFT HEEL ASSOCIATED WITH DIABETES MELLITUS DUE TO UNDERLYING CONDITION, LIMITED TO BREAKDOWN OF SKIN (HCC): Primary | ICD-10-CM

## 2021-12-02 DIAGNOSIS — E11.621 DIABETIC ULCER OF LEFT HEEL ASSOCIATED WITH TYPE 2 DIABETES MELLITUS, LIMITED TO BREAKDOWN OF SKIN (HCC): ICD-10-CM

## 2021-12-02 DIAGNOSIS — L97.421 DIABETIC ULCER OF LEFT HEEL ASSOCIATED WITH TYPE 2 DIABETES MELLITUS, LIMITED TO BREAKDOWN OF SKIN (HCC): ICD-10-CM

## 2021-12-02 PROCEDURE — 99212 OFFICE O/P EST SF 10 MIN: CPT | Performed by: SPECIALIST

## 2021-12-02 PROCEDURE — 99213 OFFICE O/P EST LOW 20 MIN: CPT | Performed by: SPECIALIST

## 2021-12-09 ENCOUNTER — OFFICE VISIT (OUTPATIENT)
Dept: FAMILY MEDICINE CLINIC | Facility: CLINIC | Age: 61
End: 2021-12-09
Payer: COMMERCIAL

## 2021-12-09 VITALS
OXYGEN SATURATION: 98 % | DIASTOLIC BLOOD PRESSURE: 68 MMHG | SYSTOLIC BLOOD PRESSURE: 116 MMHG | RESPIRATION RATE: 18 BRPM | TEMPERATURE: 97.4 F | HEART RATE: 76 BPM

## 2021-12-09 DIAGNOSIS — Z89.611 S/P AKA (ABOVE KNEE AMPUTATION), RIGHT (HCC): Primary | ICD-10-CM

## 2021-12-09 DIAGNOSIS — E11.42 TYPE 2 DIABETES MELLITUS WITH DIABETIC POLYNEUROPATHY, WITH LONG-TERM CURRENT USE OF INSULIN (HCC): ICD-10-CM

## 2021-12-09 DIAGNOSIS — Z79.4 TYPE 2 DIABETES MELLITUS WITH DIABETIC POLYNEUROPATHY, WITH LONG-TERM CURRENT USE OF INSULIN (HCC): ICD-10-CM

## 2021-12-09 PROCEDURE — 99213 OFFICE O/P EST LOW 20 MIN: CPT | Performed by: NURSE PRACTITIONER

## 2021-12-09 RX ORDER — INSULIN LISPRO 100 [IU]/ML
INJECTION, SOLUTION INTRAVENOUS; SUBCUTANEOUS
Qty: 15 ML | Refills: 3 | Status: SHIPPED | OUTPATIENT
Start: 2021-12-09 | End: 2022-03-31 | Stop reason: SDUPTHER

## 2021-12-10 DIAGNOSIS — I74.10 AORTIC THROMBUS (HCC): ICD-10-CM

## 2021-12-10 RX ORDER — APIXABAN 5 MG/1
TABLET, FILM COATED ORAL
Qty: 60 TABLET | Refills: 0 | Status: SHIPPED | OUTPATIENT
Start: 2021-12-10 | End: 2022-01-07

## 2021-12-13 PROBLEM — E11.621 DIABETIC ULCER OF LEFT HEEL ASSOCIATED WITH TYPE 2 DIABETES MELLITUS, LIMITED TO BREAKDOWN OF SKIN (HCC): Status: ACTIVE | Noted: 2018-10-30

## 2021-12-21 ENCOUNTER — APPOINTMENT (OUTPATIENT)
Dept: LAB | Facility: CLINIC | Age: 61
End: 2021-12-21
Payer: COMMERCIAL

## 2021-12-21 DIAGNOSIS — E11.42 TYPE 2 DIABETES MELLITUS WITH DIABETIC POLYNEUROPATHY, WITH LONG-TERM CURRENT USE OF INSULIN (HCC): ICD-10-CM

## 2021-12-21 DIAGNOSIS — Z79.4 TYPE 2 DIABETES MELLITUS WITH DIABETIC POLYNEUROPATHY, WITH LONG-TERM CURRENT USE OF INSULIN (HCC): ICD-10-CM

## 2021-12-21 LAB
ALBUMIN SERPL BCP-MCNC: 3.5 G/DL (ref 3.5–5)
ALP SERPL-CCNC: 137 U/L (ref 46–116)
ALT SERPL W P-5'-P-CCNC: 30 U/L (ref 12–78)
ANION GAP SERPL CALCULATED.3IONS-SCNC: 5 MMOL/L (ref 4–13)
AST SERPL W P-5'-P-CCNC: 25 U/L (ref 5–45)
BILIRUB SERPL-MCNC: 0.44 MG/DL (ref 0.2–1)
BUN SERPL-MCNC: 25 MG/DL (ref 5–25)
CALCIUM SERPL-MCNC: 9.7 MG/DL (ref 8.3–10.1)
CHLORIDE SERPL-SCNC: 104 MMOL/L (ref 100–108)
CO2 SERPL-SCNC: 30 MMOL/L (ref 21–32)
CREAT SERPL-MCNC: 1.26 MG/DL (ref 0.6–1.3)
GFR SERPL CREATININE-BSD FRML MDRD: 60 ML/MIN/1.73SQ M
GLUCOSE P FAST SERPL-MCNC: 172 MG/DL (ref 65–99)
POTASSIUM SERPL-SCNC: 5 MMOL/L (ref 3.5–5.3)
PROT SERPL-MCNC: 8.6 G/DL (ref 6.4–8.2)
SODIUM SERPL-SCNC: 139 MMOL/L (ref 136–145)

## 2021-12-21 PROCEDURE — 80053 COMPREHEN METABOLIC PANEL: CPT

## 2021-12-21 PROCEDURE — 36415 COLL VENOUS BLD VENIPUNCTURE: CPT

## 2021-12-22 ENCOUNTER — OFFICE VISIT (OUTPATIENT)
Dept: VASCULAR SURGERY | Facility: CLINIC | Age: 61
End: 2021-12-22
Payer: COMMERCIAL

## 2021-12-22 VITALS — TEMPERATURE: 97.5 F | DIASTOLIC BLOOD PRESSURE: 84 MMHG | HEART RATE: 86 BPM | SYSTOLIC BLOOD PRESSURE: 130 MMHG

## 2021-12-22 DIAGNOSIS — M21.372 FOOT DROP, LEFT: ICD-10-CM

## 2021-12-22 DIAGNOSIS — L97.421 DIABETIC ULCER OF LEFT HEEL ASSOCIATED WITH TYPE 2 DIABETES MELLITUS, LIMITED TO BREAKDOWN OF SKIN (HCC): ICD-10-CM

## 2021-12-22 DIAGNOSIS — E11.621 DIABETIC ULCER OF LEFT HEEL ASSOCIATED WITH TYPE 2 DIABETES MELLITUS, LIMITED TO BREAKDOWN OF SKIN (HCC): ICD-10-CM

## 2021-12-22 DIAGNOSIS — Z89.611 S/P AKA (ABOVE KNEE AMPUTATION), RIGHT (HCC): Primary | ICD-10-CM

## 2021-12-22 DIAGNOSIS — S78.111A ABOVE-KNEE AMPUTATION OF RIGHT LOWER EXTREMITY (HCC): ICD-10-CM

## 2021-12-22 PROBLEM — M86.251 SUBACUTE OSTEOMYELITIS OF RIGHT FEMUR (HCC): Status: RESOLVED | Noted: 2021-09-13 | Resolved: 2021-12-22

## 2021-12-22 PROCEDURE — 99212 OFFICE O/P EST SF 10 MIN: CPT | Performed by: SURGERY

## 2021-12-23 ENCOUNTER — OFFICE VISIT (OUTPATIENT)
Dept: FAMILY MEDICINE CLINIC | Facility: CLINIC | Age: 61
End: 2021-12-23
Payer: COMMERCIAL

## 2021-12-23 VITALS
TEMPERATURE: 97.8 F | DIASTOLIC BLOOD PRESSURE: 76 MMHG | HEART RATE: 84 BPM | SYSTOLIC BLOOD PRESSURE: 128 MMHG | OXYGEN SATURATION: 94 %

## 2021-12-23 DIAGNOSIS — E78.2 MIXED HYPERLIPIDEMIA: ICD-10-CM

## 2021-12-23 DIAGNOSIS — R19.7 DIARRHEA, UNSPECIFIED TYPE: ICD-10-CM

## 2021-12-23 DIAGNOSIS — E11.42 TYPE 2 DIABETES MELLITUS WITH DIABETIC POLYNEUROPATHY, UNSPECIFIED WHETHER LONG TERM INSULIN USE (HCC): Primary | ICD-10-CM

## 2021-12-23 DIAGNOSIS — E11.9 ENCOUNTER FOR DIABETIC FOOT EXAM (HCC): ICD-10-CM

## 2021-12-23 PROCEDURE — 99214 OFFICE O/P EST MOD 30 MIN: CPT | Performed by: NURSE PRACTITIONER

## 2021-12-23 PROCEDURE — 0241U HB NFCT DS VIR RESP RNA 4 TRGT: CPT | Performed by: NURSE PRACTITIONER

## 2021-12-24 DIAGNOSIS — S78.111A ABOVE-KNEE AMPUTATION OF RIGHT LOWER EXTREMITY (HCC): ICD-10-CM

## 2021-12-24 DIAGNOSIS — E11.42 TYPE 2 DIABETES MELLITUS WITH DIABETIC POLYNEUROPATHY, UNSPECIFIED WHETHER LONG TERM INSULIN USE (HCC): ICD-10-CM

## 2021-12-27 RX ORDER — PREGABALIN 100 MG/1
CAPSULE ORAL
Qty: 90 CAPSULE | Refills: 0 | Status: SHIPPED | OUTPATIENT
Start: 2021-12-27 | End: 2022-01-05 | Stop reason: SDUPTHER

## 2021-12-27 RX ORDER — DULOXETIN HYDROCHLORIDE 30 MG/1
CAPSULE, DELAYED RELEASE ORAL
Qty: 30 CAPSULE | Refills: 0 | Status: SHIPPED | OUTPATIENT
Start: 2021-12-27 | End: 2022-01-05 | Stop reason: SDUPTHER

## 2022-01-05 ENCOUNTER — OFFICE VISIT (OUTPATIENT)
Dept: PAIN MEDICINE | Facility: CLINIC | Age: 62
End: 2022-01-05
Payer: COMMERCIAL

## 2022-01-05 VITALS
HEART RATE: 88 BPM | DIASTOLIC BLOOD PRESSURE: 84 MMHG | HEIGHT: 68 IN | BODY MASS INDEX: 27.06 KG/M2 | SYSTOLIC BLOOD PRESSURE: 152 MMHG

## 2022-01-05 DIAGNOSIS — G89.4 CHRONIC PAIN SYNDROME: Primary | ICD-10-CM

## 2022-01-05 DIAGNOSIS — E11.42 TYPE 2 DIABETES MELLITUS WITH DIABETIC POLYNEUROPATHY, UNSPECIFIED WHETHER LONG TERM INSULIN USE (HCC): ICD-10-CM

## 2022-01-05 DIAGNOSIS — S78.111A ABOVE-KNEE AMPUTATION OF RIGHT LOWER EXTREMITY (HCC): ICD-10-CM

## 2022-01-05 PROCEDURE — 3079F DIAST BP 80-89 MM HG: CPT | Performed by: NURSE PRACTITIONER

## 2022-01-05 PROCEDURE — 3077F SYST BP >= 140 MM HG: CPT | Performed by: NURSE PRACTITIONER

## 2022-01-05 PROCEDURE — 99214 OFFICE O/P EST MOD 30 MIN: CPT | Performed by: NURSE PRACTITIONER

## 2022-01-05 RX ORDER — DULOXETIN HYDROCHLORIDE 60 MG/1
60 CAPSULE, DELAYED RELEASE ORAL
Qty: 30 CAPSULE | Refills: 1 | Status: SHIPPED | OUTPATIENT
Start: 2022-01-05 | End: 2022-02-16 | Stop reason: SDUPTHER

## 2022-01-05 RX ORDER — PREGABALIN 75 MG/1
75 CAPSULE ORAL 3 TIMES DAILY
Qty: 90 CAPSULE | Refills: 1 | Status: SHIPPED | OUTPATIENT
Start: 2022-01-05 | End: 2022-02-16 | Stop reason: SDUPTHER

## 2022-01-05 NOTE — PROGRESS NOTES
Assessment:  1  Chronic pain syndrome    2  Above-knee amputation of right lower extremity ([facility] [address] )    3  Type 2 diabetes mellitus with diabetic polyneuropathy, unspecified whether long term insulin use ([facility] [address] )        Plan:  While the patient was in the office today, I did have a thorough conversation regarding their chronic pain syndrome, medication management, and treatment plan options  Patient is being seen for follow-up visit  Patient has been prescribed Lyrica 100 mg 3 times daily as well as Cymbalta 30 mg at bedtime which has been pretty effective in controlling his pain  Patient is also treated at a methadone clinic and tells me that they would like him to be weaned off of the Lyrica  He is very concerned about stopping Lyrica because it has been very effective in controlling his pain  At this point, we will plan to increase the Cymbalta to 60 mg at bedtime and reduce the Lyrica to 75 mg 3 times daily  Prescriptions for both medications were sent electronically to his pharmacy  The patient will follow-up in 1 month for medication prescription refill and reevaluation  The patient was advised to contact the office should their symptoms worsen in the interim  The patient was agreeable and verbalized an understanding  History of Present Illness: The patient is a 59 y o  male who presents for a follow up office visit in regards to Arm Pain, Hand Pain, Leg Pain, Knee Pain, and Foot Pain  The patients current symptoms include complaints of pain in his left foot, pain in the right stump  Current pain level is a 4/10  Quality pain is described as throbbing, sharp, shooting, numb, pins and needles  Current pain medications includes:  Lyrica 100 mg 3 times daily, Cymbalta 30 mg at bedtime, he is prescribed methadone 10 mg daily through a methadone clinic     The patient reports that this regimen is providing 80 % pain relief    The patient is reporting no side effects from this pain medication regimen  I have personally reviewed and/or updated the patient's past medical history, past surgical history, family history, social history, current medications, allergies, and vital signs today  Review of Systems  Review of Systems   Gastrointestinal: Positive for nausea and vomiting  Musculoskeletal: Positive for gait problem, joint swelling (foot ) and myalgias  Pain in extremity- phantom pain and cramping     Neurological:        Memory loss      All other systems reviewed and are negative  Past Medical History:   Diagnosis Date    Arthritis     Diabetes mellitus ([facility] [address] )     GERD (gastroesophageal reflux disease)     Hypercholesteremia     Hypertension     Ischemia of right lower extremity with suspected rhabdomyolysis 2/6/2021    Left Hydropneumothorax 2/10/2021    Methadone use     Pneumonia due to COVID-19 virus 1/11/2021    Subacute osteomyelitis of right femur ([facility] [address] ) 9/13/2021       Past Surgical History:   Procedure Laterality Date    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    Procedure: AMPUTATION ABOVE KNEE (AKA);   Surgeon: Hector Ragsdale MD;  [facility];  Service: Vascular    AMPUTATION ABOVE KNEE (AKA) Right 1/18/2021    Procedure: AMPUTATION ABOVE KNEE (AKA) FORMALIZATION,  R AKA wound washout, wound closure;  Surgeon: Hector Ragsdale MD;  [facility];  Service: Vascular    ARTERIOGRAM Right 1/13/2021    Procedure: ARTERIOGRAM;  Surgeon: Dea Salamanca DO;  [facility];  Service: Vascular    IR CHEST TUBE PLACEMENT  2/10/2021    IR LOWER EXTREMITY ANGIOGRAM  1/14/2021    THROMBECTOMY W/ EMBOLECTOMY Right 1/13/2021    Procedure: EMBOLECTOMY/THROMBECTOMY LOWER EXTREMITY;  Surgeon: Dea Salamanca DO;  [facility];  Service: Vascular       Family History   Problem Relation Age of Onset    Diabetes Mother     Hypertension Father     Leukemia Father     Acute lymphoblastic leukemia Father    Jon Belt Sister Social History     Occupational History    Not on file   Tobacco Use    Smoking status: Former Smoker    Smokeless tobacco: Never Used   Vaping Use    Vaping Use: Never used   Substance and Sexual Activity    Alcohol use: Not Currently    Drug use: No     Comment: methadone    Sexual activity: Not on file         Current Outpatient Medications:     acetaminophen (TYLENOL) 325 mg tablet, Take 3 tablets (975 mg total) by mouth every 8 (eight) hours (Patient taking differently: Take 650 mg by mouth 3 (three) times a day as needed for moderate pain ), Disp: , Rfl: 0    albuterol (PROVENTIL HFA,VENTOLIN HFA) 90 mcg/act inhaler, Inhale 2 puffs every 4 (four) hours as needed for wheezing, Disp: , Rfl: 0    Blood Glucose Monitoring Suppl (OneTouch Verio) w/Device KIT, Use to test blood sugars 3 times daily, Disp: 1 kit, Rfl: 0    cholecalciferol (VITAMIN D3) 1,000 units tablet, Take 2 tablets (2,000 Units total) by mouth daily, Disp: 12 tablet, Rfl: 0    Diclofenac Sodium (VOLTAREN) 1 %, Apply 4 g topically 4 (four) times a day (Patient taking differently: Apply 4 g topically 4 (four) times a day as needed ), Disp: , Rfl: 0    DULoxetine (CYMBALTA) 60 mg delayed release capsule, Take 1 capsule (60 mg total) by mouth daily at bedtime, Disp: 30 capsule, Rfl: 1    Eliquis 5 MG, Take 1 tablet by mouth twice daily, Disp: 60 tablet, Rfl: 0    Empagliflozin (Jardiance) 25 MG TABS, Take 1 tablet (25 mg total) by mouth every morning, Disp: 90 tablet, Rfl: 3    fluticasone (FLONASE) 50 mcg/act nasal spray, 1 spray into each nostril daily, Disp: 16 g, Rfl: 2    glucose blood test strip, Use 1 each 3 (three) times daily after meals Use as instructed, Disp: 270 each, Rfl: 3    insulin glargine (LANTUS) 100 units/mL subcutaneous injection, Inject 20 Units under the skin daily at bedtime, Disp: 10 mL, Rfl: 2    insulin lispro (HumaLOG KwikPen) 100 units/mL injection pen, Blood Glucose 150 - 224: 3 Unit of Insulin Blood Glucose 225 - 299: 4 Units of Insulin Blood Glucose 300 - 374: 5 Units of Insulin Blood Glucose 375 - 449: 6 Units of Insulin Blood Glucose greater than or equal to 450: 7 Units of Insulin, Disp: 15 mL, Rfl: 3    Insulin Pen Needle 31G X 5 MM MISC, Use daily Pt to inject 4 X daily, Disp: 100 each, Rfl: 5    Insulin Pen Needle 31G X 5 MM MISC, 30 units sq 2X daily, Disp: 100 each, Rfl: 3    lansoprazole (PREVACID) 30 mg capsule, Take 1 capsule (30 mg total) by mouth daily, Disp: 30 capsule, Rfl: 0    levocetirizine (XYZAL) 5 MG tablet, Take 1 tablet (5 mg total) by mouth every evening, Disp: 30 tablet, Rfl: 3    lidocaine (LMX) 4 % cream, Apply topically 4 (four) times a day as needed for mild pain, Disp: 30 g, Rfl: 0    lisinopril (ZESTRIL) 10 mg tablet, Take 1 tablet (10 mg total) by mouth daily, Disp: 30 tablet, Rfl: 0    magnesium oxide (MAG-OX) 400 mg, Take 1 tablet (400 mg total) by mouth 2 (two) times a day, Disp: 60 tablet, Rfl: 0    menthol-methyl salicylate (BENGAY) 41-72 % cream, Apply topically 4 (four) times a day as needed (torticollis), Disp: , Rfl: 0    methadone (DOLOPHINE) 10 mg tablet, Take 70 mg by mouth daily,, Disp: , Rfl:     multivitamin-minerals (CENTRUM ADULTS) tablet, Take 1 tablet by mouth daily, Disp: , Rfl: 0    naloxone (NARCAN) 4 mg/0 1 mL nasal spray, Administer 1 spray into a nostril   If no response after 2-3 minutes, give another dose in the other nostril using a new spray , Disp: 1 each, Rfl: 1    pregabalin (LYRICA) 75 mg capsule, Take 1 capsule (75 mg total) by mouth 3 (three) times a day, Disp: 90 capsule, Rfl: 1    silver sulfadiazine (SILVADENE,SSD) 1 % cream, Apply topically daily Apply as directed to left heel ulcer daily with each dressing change , Disp: 25 g, Rfl: 2    sitaGLIPtin-metFORMIN (JANUMET)  MG per tablet, Take 1 tablet by mouth 2 (two) times a day with meals, Disp: 60 tablet, Rfl: 0    Allergies   Allergen Reactions    Varenicline Physical Exam:    /84   Pulse 88   Ht 5' 8" (1 727 m)   BMI 27 06 kg/m²     Constitutional:normal, well developed, well nourished, alert, in no distress and non-toxic and no overt pain behavior  Eyes:anicteric  HEENT:grossly intact  Neck:supple, symmetric, trachea midline and no masses   Pulmonary:even and unlabored  Cardiovascular:No edema or pitting edema present  Skin:Normal without rashes or lesions and well hydrated  Psychiatric:Mood and affect appropriate  Neurologic:Cranial Nerves II-XII grossly intact  Musculoskeletal:in wheelchair    Imaging  No orders to display       No orders of the defined types were placed in this encounter

## 2022-01-18 DIAGNOSIS — Z79.4 TYPE 2 DIABETES MELLITUS WITH HYPERGLYCEMIA, WITH LONG-TERM CURRENT USE OF INSULIN (HCC): Primary | ICD-10-CM

## 2022-01-18 DIAGNOSIS — E11.65 TYPE 2 DIABETES MELLITUS WITH HYPERGLYCEMIA, WITH LONG-TERM CURRENT USE OF INSULIN (HCC): Primary | ICD-10-CM

## 2022-01-19 ENCOUNTER — OFFICE VISIT (OUTPATIENT)
Dept: ENDOCRINOLOGY | Facility: CLINIC | Age: 62
End: 2022-01-19
Payer: COMMERCIAL

## 2022-01-19 VITALS — TEMPERATURE: 96.2 F | HEART RATE: 96 BPM | DIASTOLIC BLOOD PRESSURE: 90 MMHG | SYSTOLIC BLOOD PRESSURE: 126 MMHG

## 2022-01-19 DIAGNOSIS — E78.2 MIXED HYPERLIPIDEMIA: Chronic | ICD-10-CM

## 2022-01-19 DIAGNOSIS — E11.65 TYPE 2 DIABETES MELLITUS WITH HYPERGLYCEMIA, WITH LONG-TERM CURRENT USE OF INSULIN (HCC): Primary | Chronic | ICD-10-CM

## 2022-01-19 DIAGNOSIS — I10 ESSENTIAL HYPERTENSION: Chronic | ICD-10-CM

## 2022-01-19 DIAGNOSIS — Z79.4 TYPE 2 DIABETES MELLITUS WITH HYPERGLYCEMIA, WITH LONG-TERM CURRENT USE OF INSULIN (HCC): Primary | Chronic | ICD-10-CM

## 2022-01-19 DIAGNOSIS — Z79.4 TYPE 2 DIABETES MELLITUS WITH HYPERGLYCEMIA, WITH LONG-TERM CURRENT USE OF INSULIN (HCC): Primary | ICD-10-CM

## 2022-01-19 DIAGNOSIS — E11.65 TYPE 2 DIABETES MELLITUS WITH HYPERGLYCEMIA, WITH LONG-TERM CURRENT USE OF INSULIN (HCC): Primary | ICD-10-CM

## 2022-01-19 DIAGNOSIS — E11.42 TYPE 2 DIABETES MELLITUS WITH DIABETIC POLYNEUROPATHY, UNSPECIFIED WHETHER LONG TERM INSULIN USE (HCC): ICD-10-CM

## 2022-01-19 PROCEDURE — 99214 OFFICE O/P EST MOD 30 MIN: CPT | Performed by: PHYSICIAN ASSISTANT

## 2022-01-19 RX ORDER — FLASH GLUCOSE SCANNING READER
EACH MISCELLANEOUS
Qty: 1 EACH | Refills: 0 | Status: SHIPPED | OUTPATIENT
Start: 2022-01-19

## 2022-01-19 RX ORDER — FLASH GLUCOSE SENSOR
KIT MISCELLANEOUS
Qty: 6 EACH | Refills: 1 | Status: SHIPPED | OUTPATIENT
Start: 2022-01-19

## 2022-01-19 RX ORDER — FLASH GLUCOSE SCANNING READER
EACH MISCELLANEOUS
COMMUNITY
End: 2022-01-19 | Stop reason: SDUPTHER

## 2022-01-19 RX ORDER — INSULIN GLARGINE 100 [IU]/ML
25 INJECTION, SOLUTION SUBCUTANEOUS
Start: 2022-01-19 | End: 2022-03-31 | Stop reason: SDUPTHER

## 2022-01-19 RX ORDER — FLASH GLUCOSE SENSOR
KIT MISCELLANEOUS
COMMUNITY
End: 2022-01-19 | Stop reason: SDUPTHER

## 2022-01-19 NOTE — PATIENT INSTRUCTIONS
Hypoglycemia instructions   Elan Stewart  1/19/2022  [id number]    Low Blood Sugar    Steps to treat low blood sugar  1  Test blood sugar if you have symptoms of low blood sugar:   Low Blood Sugar Symptoms:  o Sweaty  o Dizzy  o Rapid heartbeat  o Shaky  o Bad mood  o Hungry      2  Treat blood sugar less than 70 with 15 grams of fast-acting carbohydrate:   Examples of 15 grams Fast-Acting Carbohydrate:  o 4 oz juice  o 4 oz regular soda  o 3-4 glucose tablets (chew)  o 3-4 hard candies (chew)          3  Wait 15 minutes and test your blood sugar again     4   If blood sugar is less than 100, repeat steps 2-3     5  When your blood sugar is 100 or more, eat a snack if it will be longer than one hour until your next meal  The snack should be 15 grams of carbohydrate and a protein:   Examples of snacks:  o ½ sandwich  o 6 crackers with cheese  o Piece of fruit with cheese or peanut butter  o 6 crackers with peanut butter

## 2022-01-19 NOTE — PROGRESS NOTES
Patient Progress Note      CC: DM   Patient has not been seen since June 2020  He is here with his nephew Rosa Arreguin      Referring Provider  Ana Cristina Park, [address] [place] [address]/[facility]  Arpit kaur,  [address]     History of Present Illness:   Gary Slater is a 59 y o  male with a history of type 2 diabetes with long term use of insulin  Diabetes course has been stable  Complications of DM: neuropathy, right AKA  He was hospitalized in September 2021 for subacute osteomyelitis of the right femur  Denies any issues with his current regimen  Home glucose monitoring: are performed regularly, 4 times a day     Does not have log or meter today  He reports in the mornings readings around 130-160 mg/dl  Later in the day his readings may range from 100s to 200s mg/dl  Current regimen:  Lantus 20 units QHS (using 25 units evening), Humalog scale only (150-224 = 3 units, 225-299 = 4 units, 300-374 = 5 units, 375-449 = 6 units, >450 = 7 units), Jardiance 25 mg daily, Janumet  mg BID  He often needs to use the scale with each meal   compliant most of the time, denies any side effects from medications  Injects in: abdomen  Rotates sites: Yes  Hypoglycemic episodes: No, rare  H/o of hypoglycemia causing hospitalization or Intervention such as glucagon injection or ambulance call : No  Hypoglycemia symptoms: jitteriness, mood changes  Treatment of hypoglycemia: orange juice, glucose tablets     Medic alert tag: recommended: Yes     Diabetes education: No  Diet: 3 meals per day, 2 snacks per day  Timing of meals is predictable  Diabetic diet compliance: noncompliant much of the time  Activity: Daily activity is predictable: Yes  No routine exercise       Ophthamology: states he went to Dr Guerrero Brand  Podiatry: foot exam UTD, Dec 2021     Has hypertension: on ACE inhibitor/ARB, compliant most of the time  Has hyperlipidemia: not on statin   Thyroid disorders: No  History of pancreatitis: No    Patient Active Problem List   Diagnosis    Chronic hepatitis C without hepatic coma ([place] [address] )    Essential hypertension    Hyperlipidemia associated with type 2 diabetes mellitus ([place] [address] )    Methadone maintenance therapy patient ([place] [address] )    Type 2 diabetes mellitus with diabetic polyneuropathy ([place] [address] )    Diabetic ulcer of left heel associated with type 2 diabetes mellitus, limited to breakdown of skin ([place] [address] )    Bilateral lower extremity edema    Positive depression screening    Mononeuropathy, unspecified    Mixed hyperlipidemia    Proteinuria    Vitamin D deficiency    Type 2 diabetes mellitus, with long-term current use of insulin (HCC)    Acute respiratory failure with hypoxia (HCC)    Hematuria    Aortic thrombus (HCC)    Prolonged Q-T interval on ECG    Empyema lung (HCC)    S/P AKA (above knee amputation), right (HCC)    Cervical radiculopathy    Chronic bilateral low back pain    Above-knee amputation of right lower extremity (HCC)    Drug-induced constipation    Moderate protein-calorie malnutrition (HCC)    Chronic pain syndrome    Chronic hyponatremia    Depression    History of COVID-19    Pulmonary fibrosis (HCC)    Foot drop, left      Past Medical History:   Diagnosis Date    Arthritis     Diabetes mellitus ([place] [address] )     GERD (gastroesophageal reflux disease)     Hypercholesteremia     Hypertension     Ischemia of right lower extremity with suspected rhabdomyolysis 2/6/2021    Left Hydropneumothorax 2/10/2021    Methadone use     Pneumonia due to COVID-19 virus 1/11/2021    Subacute osteomyelitis of right femur ([place] [address] ) 9/13/2021      Past Surgical History:   Procedure Laterality Date    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    Procedure: AMPUTATION ABOVE KNEE (AKA);   Surgeon: Haylie Brady MD;  [facility];  Service: Vascular    AMPUTATION ABOVE KNEE (AKA) Right 1/18/2021    Procedure: AMPUTATION ABOVE KNEE (AKA) FORMALIZATION,  R AKA wound washout, wound closure;  Surgeon: Lara Weiss Francisco Javier Guan MD;  [facility];  Service: Vascular    ARTERIOGRAM Right 1/13/2021    Procedure: ARTERIOGRAM;  Surgeon: Murray Guevara DO;  [facility];  Service: Vascular    IR CHEST TUBE PLACEMENT  2/10/2021    IR LOWER EXTREMITY ANGIOGRAM  1/14/2021    THROMBECTOMY W/ EMBOLECTOMY Right 1/13/2021    Procedure: EMBOLECTOMY/THROMBECTOMY LOWER EXTREMITY;  Surgeon: Murray Guevara DO;  [facility];  Service: Vascular      Family History   Problem Relation Age of Onset    Diabetes Mother     Hypertension Father     Leukemia Father     Acute lymphoblastic leukemia Father     Cancer Sister      Social History     Tobacco Use    Smoking status: Former Smoker    Smokeless tobacco: Never Used   Substance Use Topics    Alcohol use: Not Currently     Allergies   Allergen Reactions    Varenicline          Current Outpatient Medications:     acetaminophen (TYLENOL) 325 mg tablet, Take 3 tablets (975 mg total) by mouth every 8 (eight) hours (Patient taking differently: Take 650 mg by mouth 3 (three) times a day as needed for moderate pain ), Disp: , Rfl: 0    albuterol (PROVENTIL HFA,VENTOLIN HFA) 90 mcg/act inhaler, Inhale 2 puffs every 4 (four) hours as needed for wheezing, Disp: , Rfl: 0    Blood Glucose Monitoring Suppl (OneTouch Verio) w/Device KIT, Use to test blood sugars 3 times daily, Disp: 1 kit, Rfl: 0    cholecalciferol (VITAMIN D3) 1,000 units tablet, Take 2 tablets (2,000 Units total) by mouth daily, Disp: 12 tablet, Rfl: 0    Diclofenac Sodium (VOLTAREN) 1 %, Apply 4 g topically 4 (four) times a day (Patient taking differently: Apply 4 g topically 4 (four) times a day as needed ), Disp: , Rfl: 0    DULoxetine (CYMBALTA) 60 mg delayed release capsule, Take 1 capsule (60 mg total) by mouth daily at bedtime, Disp: 30 capsule, Rfl: 1    Eliquis 5 MG, Take 1 tablet by mouth twice daily, Disp: 180 tablet, Rfl: 2    Empagliflozin (Jardiance) 25 MG TABS, Take 1 tablet (25 mg total) by mouth every morning, Disp: 90 tablet, Rfl: 3    fluticasone (FLONASE) 50 mcg/act nasal spray, 1 spray into each nostril daily, Disp: 16 g, Rfl: 2    glucose blood test strip, Use 1 each 3 (three) times daily after meals Use as instructed, Disp: 270 each, Rfl: 3    insulin glargine (LANTUS) 100 units/mL subcutaneous injection, Inject 25 Units under the skin daily at bedtime, Disp: , Rfl:     insulin lispro (HumaLOG KwikPen) 100 units/mL injection pen, Blood Glucose 150 - 224: 3 Unit of Insulin Blood Glucose 225 - 299: 4 Units of Insulin Blood Glucose 300 - 374: 5 Units of Insulin Blood Glucose 375 - 449: 6 Units of Insulin Blood Glucose greater than or equal to 450: 7 Units of Insulin, Disp: 15 mL, Rfl: 3    Insulin Pen Needle 31G X 5 MM MISC, Use daily Pt to inject 4 X daily, Disp: 100 each, Rfl: 5    Insulin Pen Needle 31G X 5 MM MISC, 30 units sq 2X daily, Disp: 100 each, Rfl: 3    lansoprazole (PREVACID) 30 mg capsule, Take 1 capsule (30 mg total) by mouth daily, Disp: 30 capsule, Rfl: 0    levocetirizine (XYZAL) 5 MG tablet, Take 1 tablet (5 mg total) by mouth every evening, Disp: 30 tablet, Rfl: 3    lidocaine (LMX) 4 % cream, Apply topically 4 (four) times a day as needed for mild pain, Disp: 30 g, Rfl: 0    lisinopril (ZESTRIL) 10 mg tablet, Take 1 tablet (10 mg total) by mouth daily, Disp: 30 tablet, Rfl: 0    magnesium oxide (MAG-OX) 400 mg, Take 1 tablet (400 mg total) by mouth 2 (two) times a day, Disp: 60 tablet, Rfl: 0    menthol-methyl salicylate (BENGAY) 93-92 % cream, Apply topically 4 (four) times a day as needed (torticollis), Disp: , Rfl: 0    methadone (DOLOPHINE) 10 mg tablet, Take 70 mg by mouth daily,, Disp: , Rfl:     multivitamin-minerals (CENTRUM ADULTS) tablet, Take 1 tablet by mouth daily, Disp: , Rfl: 0    naloxone (NARCAN) 4 mg/0 1 mL nasal spray, Administer 1 spray into a nostril   If no response after 2-3 minutes, give another dose in the other nostril using a new spray , Disp: 1 each, Rfl: 1    pregabalin (LYRICA) 75 mg capsule, Take 1 capsule (75 mg total) by mouth 3 (three) times a day, Disp: 90 capsule, Rfl: 1    silver sulfadiazine (SILVADENE,SSD) 1 % cream, Apply topically daily Apply as directed to left heel ulcer daily with each dressing change , Disp: 25 g, Rfl: 2    sitaGLIPtin-metFORMIN (JANUMET)  MG per tablet, Take 1 tablet by mouth 2 (two) times a day with meals, Disp: 60 tablet, Rfl: 0    Continuous Blood Gluc  (FreeStyle Ke 2 [place]) JOSEPH, Use as directed, Disp: 1 each, Rfl: 0    Continuous Blood Gluc Sensor (FreeStyle Ke 2 Sensor) MISC, Change every 14 days, Disp: 6 each, Rfl: 1  Review of Systems   Constitutional: Positive for activity change (decreased) and fatigue  Negative for appetite change and unexpected weight change  HENT: Negative for trouble swallowing  Eyes: Positive for visual disturbance  Respiratory: Negative for shortness of breath  Cardiovascular: Negative for chest pain and palpitations  Gastrointestinal: Positive for constipation (occasional)  Negative for diarrhea  Endocrine: Negative for polydipsia and polyuria  Musculoskeletal: Positive for arthralgias  Skin: Positive for wound (closed wound; managed by podiatry / wound cared)  Neurological: Positive for numbness  Psychiatric/Behavioral: Negative  Physical Exam:  There is no height or weight on file to calculate BMI  /90   Pulse 96   Temp (!) 96 2 °F (35 7 °C) (Tympanic)    Wt Readings from Last 3 Encounters:   09/22/21 80 7 kg (178 lb)   09/20/21 80 7 kg (178 lb)   09/13/21 81 1 kg (178 lb 12 7 oz)       Physical Exam  Vitals and nursing note reviewed  Constitutional:       Appearance: He is well-developed  HENT:      Head: Normocephalic  Eyes:      General: No scleral icterus  Pupils: Pupils are equal, round, and reactive to light  Neck:      Thyroid: No thyromegaly     Cardiovascular:      Rate and Rhythm: Normal rate and regular rhythm  Pulses:           Radial pulses are 2+ on the right side and 2+ on the left side  Heart sounds: No murmur heard  Pulmonary:      Effort: Pulmonary effort is normal  No respiratory distress  Breath sounds: Normal breath sounds  No wheezing  Musculoskeletal:      Cervical back: Neck supple  Comments: Right AKA   Skin:     General: Skin is warm and dry  Neurological:      Mental Status: He is alert  Patient's shoes and socks were not removed  Labs:   Component      Latest Ref Rng & Units 9/2/2021 12/1/2021   Sodium      136 - 145 mmol/L 135 (L) 134 (L)   Potassium      3 5 - 5 3 mmol/L 4 9 5 1   Chloride      100 - 108 mmol/L 102 100   CO2      21 - 32 mmol/L 29 30   Anion Gap      4 - 13 mmol/L 4 4   BUN      5 - 25 mg/dL 31 (H) 28 (H)   Creatinine      0 60 - 1 30 mg/dL 1 05 1 31 (H)   GLUCOSE FASTING      65 - 99 mg/dL 265 (H) 305 (H)   Calcium      8 3 - 10 1 mg/dL 9 4 9 7   CORRECTED CALCIUM      8 3 - 10 1 mg/dL 10 1 10 3 (H)   AST      5 - 45 U/L 21 18   ALT      12 - 78 U/L 29 29   Alkaline Phosphatase      46 - 116 U/L 155 (H) 143 (H)   Total Protein      6 4 - 8 2 g/dL 8 7 (H) 8 7 (H)   Albumin      3 5 - 5 0 g/dL 3 1 (L) 3 3 (L)   TOTAL BILIRUBIN      0 20 - 1 00 mg/dL 0 31 0 77   eGFR      ml/min/1 73sq m 75 58   Hemoglobin A1C      Normal 3 8-5 6%; PreDiabetic 5 7-6 4%; Diabetic >=6 5%; Glycemic control for adults with diabetes <7 0% % 8 3 (H) 8 9 (H)   eAG, EST AVG Glucose      mg/dl 192 209     Plan:    Diagnoses and all orders for this visit:    Type 2 diabetes mellitus with hyperglycemia, with long-term current use of insulin (HCC)  HGA1C 8 9%  Worsened  Treatment regimen: advised patient to send log and document on log how much Humalog he is using with each meal  Based on that will likely start patient on set meal time doses plus scale  Prescribed Ke  Discussed intensive insulin regimen does increase risk for hypoglycemia  Episodes of hypoglycemia can lead to permanent disability and death  Discussed risks/complications associated with uncontrolled diabetes  Advised to adhere to diabetic diet, and recommended staying active/exercising routinely  Keep carbohydrates consistent to limit blood glucose fluctuations  Advised to call if blood sugars less than 70 mg/dl or over 300 mg/dl  Check blood glucose 3+ times a day  Discussed symptoms and treatment of hypoglycemia  Discussed use of CGM to collect additional blood glucose data to reveal trends and patterns that can be used to optimize treatment plan  Referred to diabetes/nutrition education  Recommended routine follow-up with podiatry and ophthalmology  Send log in 1-2 weeks  Ordered blood work to complete prior to next visit  -     Hemoglobin A1C; Future  -     Basic metabolic panel; Future  -     Microalbumin / creatinine urine ratio; Future  -     insulin glargine (LANTUS) 100 units/mL subcutaneous injection; Inject 25 Units under the skin daily at bedtime  -     Ambulatory referral to Diabetic Education; Future    Essential hypertension  Blood pressure elevated  Advised to monitor BP at home and follow-up with PCP if remaining elevated  For now continue current treatment  -     Basic metabolic panel; Future    Mixed hyperlipidemia  LDL previously 77  Not on statin therapy  Unclear who had discontinued it as note from 2/2021 states patient to continue statin   He will discuss this with his PCP        Discussed with the patient diagnosis and treatment and all questions fully answered  He will call me if any problems arise  Counseled patient on diagnostic results, prognosis, risk and benefit of treatment options, instruction for management, importance of treatment compliance, risk factor reduction and impressions        Annika Patel PA-C

## 2022-01-20 RX ORDER — SYRING-NEEDL,DISP,INSUL,0.3 ML 31GX15/64"
SYRINGE, EMPTY DISPOSABLE MISCELLANEOUS
Qty: 100 EACH | Refills: 0 | Status: SHIPPED | OUTPATIENT
Start: 2022-01-20 | End: 2022-05-01

## 2022-01-21 DIAGNOSIS — J30.2 SEASONAL ALLERGIES: ICD-10-CM

## 2022-01-21 RX ORDER — LEVOCETIRIZINE DIHYDROCHLORIDE 5 MG/1
TABLET, FILM COATED ORAL
Qty: 30 TABLET | Refills: 0 | Status: SHIPPED | OUTPATIENT
Start: 2022-01-21 | End: 2022-01-28

## 2022-01-24 ENCOUNTER — TELEPHONE (OUTPATIENT)
Dept: ADMINISTRATIVE | Facility: OTHER | Age: 62
End: 2022-01-24

## 2022-01-24 NOTE — LETTER
Diabetic Eye Exam Form    Date Requested: 22  Patient: To Pole  Patient : 1960   Referring Provider: VERONICA Abarca    Dilated Retinal Exam, Optomap-Iris Exam, or Fundus Photography Done         Yes ([place] one above)         No     Date of Diabetic Eye Exam ______________________________  Left Eye      Exam did show retinopathy    Exam did not show retinopathy         Mild       Moderate       None       Proliferative       Severe     Right Eye     Exam did show retinopathy    Exam did not show retinopathy         Mild       Moderate       None       Proliferative       Severe     Comments __________________________________________________________    Practice Providing Exam ______________________________________________    Exam Performed By (print name) _______________________________________      Provider Signature ___________________________________________________      These reports are needed for  compliance  Please fax this completed form and a copy of the Diabetic Eye Exam report to our office located at [place] [address] as soon as possible via [phone] attention Sharon Villatoro: Phone [phone]    We thank you for your assistance in treating our mutual patient

## 2022-01-24 NOTE — TELEPHONE ENCOUNTER
----- Message from Ben Crandall, [address] sent at 2022  8:38 AM EST -----  [address] Exam  Contact: [phone]   22 8:38 AM    Hello, our patient Hank Bowman has had Diabetic Eye Exam completed/performed  Please assist in updating the patient chart by making an External outreach to [facility] located in [place]  The date of service is last 2 months      Thank you,  SHAYNA San PG

## 2022-01-24 NOTE — LETTER
Diabetic Eye Exam Form    Date Requested: 22  Patient: Chapito Lynch  Patient : 1960   Referring Provider: VERONICA Hurley    Dilated Retinal Exam, Optomap-Iris Exam, or Fundus Photography Done         Yes ([place] one above)         No     Date of Diabetic Eye Exam ______________________________  Left Eye      Exam did show retinopathy    Exam did not show retinopathy         Mild       Moderate       None       Proliferative       Severe     Right Eye     Exam did show retinopathy    Exam did not show retinopathy         Mild       Moderate       None       Proliferative       Severe     Comments __________________________________________________________    Practice Providing Exam ______________________________________________    Exam Performed By (print name) _______________________________________      Provider Signature ___________________________________________________      These reports are needed for  compliance  Please fax this completed form and a copy of the Diabetic Eye Exam report to our office located at [place] [address] as soon as possible via [phone] [facility]: Phone [phone]    We thank you for your assistance in treating our mutual patient

## 2022-01-25 ENCOUNTER — TELEPHONE (OUTPATIENT)
Dept: ADMINISTRATIVE | Facility: OTHER | Age: 62
End: 2022-01-25

## 2022-01-25 NOTE — TELEPHONE ENCOUNTER
Upon review of the In Basket request and the patient's chart, initial outreach has been made via fax, please see Contacts section for details       Thank you  Ana Molina MA

## 2022-01-25 NOTE — TELEPHONE ENCOUNTER
----- Message from Otis Fields sent at 2022  8:53 AM EST -----  [address] Exam  Contact: [phone]   22 8:53 AM    Hello, our patient Desean Pedroza has had Diabetic Eye Exam completed/performed  Please assist in updating the patient chart by pulling the document from the Media Tab  The date of service is 21 date of scan 12/10/21       Thank you,  SHAYNA Fields PG

## 2022-01-26 ENCOUNTER — TELEPHONE (OUTPATIENT)
Dept: ENDOCRINOLOGY | Facility: CLINIC | Age: 62
End: 2022-01-26

## 2022-01-26 NOTE — TELEPHONE ENCOUNTER
Upon review of the In Basket request we were able to locate, review, and update the patient chart as requested for Diabetic Eye Exam     Any additional questions or concerns should be emailed to the Practice Liaisons via [email or web address] email, please do not reply via In Basket      Thank you  Sari Freire

## 2022-01-27 NOTE — PROGRESS NOTES
PT Evaluation     Today's date: 2022  Patient name: Donald Randhawa  : 1960  MRN: [id number]  Referring provider: Megha Carrasco DO  Dx:   Encounter Diagnosis     ICD-10-CM    1  Foot drop, left  M21 372    2  Hx of AKA (above knee amputation), right ([facility] [address] )  Z89 611                   Assessment  Assessment details: The patient is a 60 y/o male who presents to PT with diagnosis of R AKA and L foot drop  He has complaints of phantom pain and leg cramps  He demonstrates deficits with decreased ROM and strength, decreased flexibility, antalgic gait with use of prosthetic and RW, decreased balance and proprioception, difficulty with stair negotiation, limited standing tolerance and pain and difficulty with completing his ADLs  He lives with his nephew who has been assisting him with ADLs and IADLs  He assists with transfers, bathing and dressing  He is transferring sit to stand with min A x 1  Assist needed to don/doff prosthetic  He ambulates with use of prosthetic and RW with min A x 1 and w/c follow secondary to fatigue  He ambulates with slow emili, decreased step and stride length and verbal cues for weight shifting  He has a MAFO for L foot but has not been using it secondary to it rubs his skin, notes he needs to get it adjusted  He has not been going upstairs at home, has been staying on first floor  He is not driving at this time  Secondary to surgery and above deficits he is limited with his overall mobility and function  Will assess balance is upcoming visits  The patient would benefit from continued PT to address deficits and improve function  Tx to include ROM, stretching, strengthening, modalities, HEP, pt education, postural ed, lifting/body mechanics, neuro re-ed, balance/proprioception Te, MT and equipment        Impairments: abnormal gait, abnormal or restricted ROM, activity intolerance, impaired balance, impaired physical strength, lacks appropriate home exercise program, pain with function, safety issue and weight-bearing intolerance  Other impairment: decreased endurance, decreased standing tolerance  Functional limitations: Assist with dressing, assist with transfers Understanding of Dx/Px/POC: good   Prognosis: good    Goals  STGs:  1  Initiate and complete HEP with verbal cues  2   Improve BLE strength by 1/2 grade in 4 weeks  3   Will assess balance in upcoming visits as set goals  4   Patient will complete transfers with CGA x 1  LTGs:  1  Patient to be I with HEP in 12 weeks  2   Improve BLE strength to > 4 to 4+/5 t/o in 12 weeks to improve function  3   Improve BLE ROM to WNL t/o in 12 weeks to improve function  4   Ambulation is improved to maximal level of function in 12 weeks  5   Stair negotiation is improved to maximal level of function in 12 weeks  6   Recreational performance in related activities is improved to maximal level of function in 12 weeks  7   ADL performance is improved to maximal level of function in 12 weeks  8   Patient will complete transfers mod I           Plan  Plan details: Modalities and therapy interventions prn  Patient would benefit from: skilled physical therapy  Planned modality interventions: cryotherapy and thermotherapy: hydrocollator packs  Planned therapy interventions: manual therapy, balance, balance/weight bearing training, neuromuscular re-education, patient education, postural training, self care, strengthening, stretching, therapeutic activities, therapeutic exercise, flexibility, home exercise program, prosthetic fitting/training, gait training and transfer training  Frequency: 2x week  Duration in weeks: 12  Plan of Care beginning date: 2/2/2022  Plan of Care expiration date: 4/27/2022  Treatment plan discussed with: patient        Subjective Evaluation    History of Present Illness  Mechanism of injury: The patient states that he had R AKA in mid January of last year    He notes he also developed L foot drop around the same time (he does have a brace for his L ankle but does not use it)  He had developed COVID and he threw a clot in his leg  He notes that his leg tissue was dying and he had to have the amputation  He was in the hospital about 1 5 months and then he went to rehab  He was in [place] for rehab and from there he went home  Once he got the prosthetic he went to acute rehab in [facility] to learn how to use it  From there he went home and he had home health PT  He was most recently discharged from home health about two weeks ago  He was referred to OPPT and he now presents for his evaluation  He was given a script for OPPT by his PCP, will be following up with them in a few weeks  Pain  At best pain rating: 3  At worst pain rating: 10  Location: R phantom pains, leg cramps   Quality: dull ache, sharp and squeezing    Social Support  Steps to enter house: yes (Ramps to enter )  4  Stairs in house: yes (Bathroom on second floor)   Lives in: multiple-level home (Able to sleep on first floor, has BS)  Lives with: Nephew  Employment status: not working  Treatments  Previous treatment: physical therapy and home therapy  Discharged from (in last 30 days): home health care  Patient Goals  Patient goals for therapy: increased motion, decreased pain, increased strength, improved balance, independence with ADLs/IADLs and return to sport/leisure activities  Patient goal: "To be able to walk with the walker, to be able to go up the steps "        Objective     Active Range of Motion   Left Hip   Flexion: WFL  Abduction: WFL    Right Hip   Flexion: WFL  Abduction: WFL  Left Knee   Flexion: WFL  Extension: [facility]    Additional Active Range of Motion Details  Ankle ROM limited 50% L ankle    Most difficulty noted with df       Strength/Myotome Testing     Left Hip   Planes of Motion   Flexion: 4+  Abduction: 4  Adduction: 4+    Right Hip   Planes of Motion   Flexion: 4  Abduction: 4-  Adduction: 4    Left Knee   Flexion: 4+  Extension: 4+    Left Ankle/Foot   Dorsiflexion: 2-  Plantar flexion: 2-    Ambulation   Weight-Bearing Status   Assistive device used: two-wheeled walker    Additional Weight-Bearing Status Details  DME: BSC, RW, hospital bed   Had been using RW around the house with home health PT  Ambulation: Level Surfaces   Ambulation with assistive device: contact guard assist    Ambulation: Stairs     Additional Stairs Ambulation Details  Patient has not been doing the steps  Observational Gait   Decreased walking speed, stride length, left step length and right step length  Base of support: decreased    Additional Observational Gait Details  Step to gait pattern, verbal cues for weight shift to advance leg      Neuro Exam:     Wheelchair mobility   WC mobility: yesPropelling: independent Turning: dependent     Transfers   Sit to stand: minimum assist with prosthesis: yes    Amputee   Level of amputation: right  and transfemoral   Phantom pain: phantom pain                Precautions: Fall Risk       Manuals 2/2/22                                       Neuro Re-Ed         SLS        Tandem Stance        Sidestepping                                        Ther Ex        NuStep        Lying prone to promote hip extension        Core Strengthening         MTP/LTP                                        Ther Activity        Steps                 Gait Training        With ' x 2  50' x 1 with RW and CGA, w/c follow               Modalities

## 2022-01-28 DIAGNOSIS — J30.2 SEASONAL ALLERGIES: ICD-10-CM

## 2022-01-28 RX ORDER — LEVOCETIRIZINE DIHYDROCHLORIDE 5 MG/1
TABLET, FILM COATED ORAL
Qty: 30 TABLET | Refills: 0 | Status: SHIPPED | OUTPATIENT
Start: 2022-01-28 | End: 2022-03-28

## 2022-02-01 NOTE — TELEPHONE ENCOUNTER
As a follow-up, a second attempt has been made for outreach via fax, please see Contacts section for details      Thank you  Lali Mijares MA

## 2022-02-02 ENCOUNTER — EVALUATION (OUTPATIENT)
Dept: PHYSICAL THERAPY | Facility: CLINIC | Age: 62
End: 2022-02-02
Payer: COMMERCIAL

## 2022-02-02 DIAGNOSIS — Z89.611 HX OF AKA (ABOVE KNEE AMPUTATION), RIGHT (HCC): ICD-10-CM

## 2022-02-02 DIAGNOSIS — M21.372 FOOT DROP, LEFT: Primary | ICD-10-CM

## 2022-02-02 PROCEDURE — 97162 PT EVAL MOD COMPLEX 30 MIN: CPT | Performed by: PHYSICAL THERAPIST

## 2022-02-02 PROCEDURE — 97116 GAIT TRAINING THERAPY: CPT | Performed by: PHYSICAL THERAPIST

## 2022-02-07 NOTE — TELEPHONE ENCOUNTER
As a final attempt, a third outreach has been made via telephone call  Please see Contacts section for details  This encounter will be closed and completed by end of day  Should we receive the requested information because of previous outreach attempts, the requested patient's chart will be updated appropriately       Thank you  Jose Whitley MA

## 2022-02-08 ENCOUNTER — OFFICE VISIT (OUTPATIENT)
Dept: PHYSICAL THERAPY | Facility: CLINIC | Age: 62
End: 2022-02-08
Payer: COMMERCIAL

## 2022-02-08 DIAGNOSIS — M21.372 FOOT DROP, LEFT: Primary | ICD-10-CM

## 2022-02-08 DIAGNOSIS — Z89.611 HX OF AKA (ABOVE KNEE AMPUTATION), RIGHT (HCC): ICD-10-CM

## 2022-02-08 PROCEDURE — 97110 THERAPEUTIC EXERCISES: CPT

## 2022-02-08 PROCEDURE — 97116 GAIT TRAINING THERAPY: CPT

## 2022-02-08 NOTE — PROGRESS NOTES
Daily Note     Today's date: 2022  Patient name: Laura Aj  : 1960  MRN: [id number]  Referring provider: Darylene Boring, DO  Dx:   Encounter Diagnosis     ICD-10-CM    1  Foot drop, left  M21 372    2  Hx of AKA (above knee amputation), right ([address] )  Z89 611                   Subjective: Having a tough day today  Feel L foot heel discomfort, question from [facility]  Not standing/walking daily  I do what I can, need help with ADL's      Objective: See treatment diary below      Assessment: Tolerated treatment well  Noted redness/press point L calcaneous with dry skin peeling  Pt encourage < thickness of sock and cont to monitor NV   Pt encourage daily skin checks BL  Pt with fwd trunk posture standing/walking  Pt reporting has been walking with locked elbows vs 15* flex to maintain upright posture  Pt independent with donning/doffing R prosthetic   Patient would benefit from continued PT      Plan: Continue per plan of care                 Precautions: Fall Risk  Re-Eval: 3/4/22      Date        Visit Count        FOTO        Pain In        Pain Out          Manuals 22 Pt arrived 15 min late                                      Neuro Re-Ed         SLS        Tandem Stance        Sidestepping        Static standing  3 min  With CGA,  Cues for postural awareness with divided attention                                Ther Ex        NuStep        Lying prone to promote hip extension  Attempted, unable      Core Strengthening   Seated EOB  5 min  W/o  *     MTP/LTP   *     STS  5x   Cues   CS/CGA                              Ther Activity        Steps                 Gait Training        With ' x 2  50' x 1 with RW and CGA, w/c follow 25' x 1 with RW and CGA, w/c follow              Modalities

## 2022-02-08 NOTE — TELEPHONE ENCOUNTER
Upon review of the In Basket request we were able to locate, review, and update the patient chart as requested for Diabetic Eye Exam     Any additional questions or concerns should be emailed to the Practice Liaisons via [email or web address] email, please do not reply via In Basket      Thank you  Selam Parker MA

## 2022-02-10 NOTE — PROGRESS NOTES
Daily Note     Today's date: 2022  Patient name: Quinten Trujillo  : 1960  MRN: [id number]  Referring provider: Matthias Farley DO  Dx:   Encounter Diagnosis     ICD-10-CM    1  Hx of AKA (above knee amputation), right ([facility] [address] )  Z89 611    2  Foot drop, left  M21 372                   Subjective:  I am standing up to 4 min before I need to sit down  I want to be able to do stairs eventually      Objective: See treatment diary below      Assessment: Tolerated treatment well  Adjusted RW with pt demonstrating improved postural alignment  Progressed POC adding UE strengthening,  Issued/educated HEP with Green TB   Patient would benefit from continued PT      Plan: Continue per plan of care                 Precautions: Fall Risk  Re-Eval: 3/4/22      Date       Visit Count        FOTO        Pain In        Pain Out          Manuals 22 Pt arrived 15 min late                                      Neuro Re-Ed         SLS        Tandem Stance        Sidestepping        Static standing  3 min  With CGA,  Cues for postural awareness with divided attention   4 min  With CGA,  Cues for postural awareness with divided attention                             Ther Ex        NuStep        Lying prone to promote hip extension  Attempted, unable      Core Strengthening   Seated EOB  5 min  W/o  *     MTP/LTP   Michael  Seated     LTP 4#  2x10    MTP 5#  2x10    Bicep 2# DB  2x10    Tricep  7#  2x10    Cues AH  Postural awareness       STS  5x   Cues   CS/CGA 5x   Cues   CS/CGA                     HEP   MTP/LTP/Bicep/tricep  Noreen Moody TB issued     Ther Activity        Steps                 Gait Training        With ' x 2  50' x 1 with RW and CGA, w/c follow 25' x 1 with RW and CGA, w/c follow 100' x 2  50' x 1 with RW and CGA, w/c follow             Modalities

## 2022-02-11 ENCOUNTER — OFFICE VISIT (OUTPATIENT)
Dept: PHYSICAL THERAPY | Facility: CLINIC | Age: 62
End: 2022-02-11
Payer: COMMERCIAL

## 2022-02-11 DIAGNOSIS — M21.372 FOOT DROP, LEFT: ICD-10-CM

## 2022-02-11 DIAGNOSIS — Z89.611 HX OF AKA (ABOVE KNEE AMPUTATION), RIGHT (HCC): Primary | ICD-10-CM

## 2022-02-11 PROCEDURE — 97116 GAIT TRAINING THERAPY: CPT

## 2022-02-11 PROCEDURE — 97110 THERAPEUTIC EXERCISES: CPT

## 2022-02-15 ENCOUNTER — OFFICE VISIT (OUTPATIENT)
Dept: PHYSICAL THERAPY | Facility: CLINIC | Age: 62
End: 2022-02-15
Payer: COMMERCIAL

## 2022-02-15 DIAGNOSIS — M21.372 FOOT DROP, LEFT: ICD-10-CM

## 2022-02-15 DIAGNOSIS — Z89.611 HX OF AKA (ABOVE KNEE AMPUTATION), RIGHT (HCC): Primary | ICD-10-CM

## 2022-02-15 PROCEDURE — 97116 GAIT TRAINING THERAPY: CPT

## 2022-02-15 PROCEDURE — 97112 NEUROMUSCULAR REEDUCATION: CPT

## 2022-02-15 PROCEDURE — 97110 THERAPEUTIC EXERCISES: CPT

## 2022-02-15 NOTE — PROGRESS NOTES
Daily Note     Today's date: 2/15/2022  Patient name: Nydia Estes  : 1960  MRN: [id number]  Referring provider: Sydney Maxwell DO  Dx:   Encounter Diagnosis     ICD-10-CM    1  Hx of AKA (above knee amputation), right ([address] )  Z89 611    2  Foot drop, left  M21 372                   Subjective: I am working on standing up to 5 min at home but not walking yet  Phantom pain R LE is improving        Objective: See treatment diary below      Assessment: Tolerated treatment well  Progressed with POC, initiated nustep for aerobic, /68,  bpm @ 5 min   Initiated Side step, standing with dynamic reach  Max cues 1* with WS/postural awareness  Pt with fwd trunk flex posture with standing/ambulation  Patient demonstrated fatigue post treatment      Plan: Continue per plan of care                 Precautions: Fall Risk  Re-Eval: 3/4/22      Date  2/8 2/11 2/15    Visit Count        FOTO        Pain In        Pain Out          Manuals 22 Pt arrived 15 min late                                      Neuro Re-Ed         SLS        Tandem Stance        Sidestepping    ADL  <> 1x counter  Cues hand placement, WS, postural awareness   CG/Lizzy    Static standing  3 min  With CGA,  Cues for postural awareness with divided attention   4 min  With CGA,  Cues for postural awareness with divided attention 5 min with dynamic reaching with cones   ADL  Cues WS/postural awareness    CS/CGA                            Ther Ex        NuStep    2x 5 min  L2/3  Cues pacing, UE/LE ext    CGA with transfer w/ RW  Cues hand placement    Lying prone to promote hip extension  Attempted, unable      Core Strengthening   Seated EOB  5 min  W/o  *     MTP/LTP   Michael  Seated     LTP 4#  2x10    MTP 5#  2x10    Bicep 2# DB  2x10    Tricep  7#  2x10    Cues AH  Postural awareness   resume    STS  5x   Cues   CS/CGA 5x   Cues   CS/CGA                     HEP   MTP/LTP/Bicep/tricep  Green TB issued     Ther Activity        Steps Gait Training        With ' x 2  50' x 1 with RW and CGA, w/c follow 25' x 1 with RW and CGA, w/c follow 100' x 2  50' x 1 with RW and CGA, w/c follow 76' x 1  72' x1  W/ RW   CGA w/ WC follow  Cues  WS, AD placement, postural awareness             Modalities

## 2022-02-16 ENCOUNTER — OFFICE VISIT (OUTPATIENT)
Dept: PAIN MEDICINE | Facility: CLINIC | Age: 62
End: 2022-02-16
Payer: COMMERCIAL

## 2022-02-16 VITALS
BODY MASS INDEX: 27.06 KG/M2 | DIASTOLIC BLOOD PRESSURE: 72 MMHG | SYSTOLIC BLOOD PRESSURE: 107 MMHG | HEIGHT: 68 IN | HEART RATE: 88 BPM

## 2022-02-16 DIAGNOSIS — Z79.4 TYPE 2 DIABETES MELLITUS WITH DIABETIC POLYNEUROPATHY, WITH LONG-TERM CURRENT USE OF INSULIN (HCC): ICD-10-CM

## 2022-02-16 DIAGNOSIS — S78.111A ABOVE-KNEE AMPUTATION OF RIGHT LOWER EXTREMITY (HCC): ICD-10-CM

## 2022-02-16 DIAGNOSIS — E11.42 TYPE 2 DIABETES MELLITUS WITH DIABETIC POLYNEUROPATHY, WITH LONG-TERM CURRENT USE OF INSULIN (HCC): ICD-10-CM

## 2022-02-16 DIAGNOSIS — G89.4 CHRONIC PAIN SYNDROME: Primary | ICD-10-CM

## 2022-02-16 PROCEDURE — 99214 OFFICE O/P EST MOD 30 MIN: CPT | Performed by: NURSE PRACTITIONER

## 2022-02-16 PROCEDURE — 3074F SYST BP LT 130 MM HG: CPT | Performed by: NURSE PRACTITIONER

## 2022-02-16 PROCEDURE — 3078F DIAST BP <80 MM HG: CPT | Performed by: NURSE PRACTITIONER

## 2022-02-16 RX ORDER — PREGABALIN 50 MG/1
50 CAPSULE ORAL 3 TIMES DAILY
Qty: 90 CAPSULE | Refills: 1 | Status: SHIPPED | OUTPATIENT
Start: 2022-02-16 | End: 2022-03-18 | Stop reason: SDUPTHER

## 2022-02-16 RX ORDER — DULOXETIN HYDROCHLORIDE 60 MG/1
60 CAPSULE, DELAYED RELEASE ORAL
Qty: 30 CAPSULE | Refills: 2 | Status: SHIPPED | OUTPATIENT
Start: 2022-02-16 | End: 2022-06-24

## 2022-02-16 NOTE — PROGRESS NOTES
Daily Note     Today's date: 2022  Patient name: Chapito Lynch  : 1960  MRN: [id number]  Referring provider: Ely Doran DO  Dx:   Encounter Diagnosis     ICD-10-CM    1  Hx of AKA (above knee amputation), right ([address] )  Z89 611    2  Foot drop, left  M21 372                   Subjective: I was a little sore after last PT session, I think from nustep activity, moreso in R thigh area   There was no redness/irritation  I am not wearing my prosthetic at home  It doesn't feel right  Looking to f/u with prosthetist for better fitting  Stump is looking better, no skin break down    Objective: See treatment diary below      Assessment: Tolerated treatment well  Pt denied any increase soreness/irritation with ambulation/nustep/standing TE  Pt encourage compliancy with carryover with HEP, static standing/wt shifting, keeping prosthetic on t/o day as pt reports not wearing prosthetic  At home, very seldom  Pt provided max cues with gait training   Patient would benefit from continued PT      Plan: Continue per plan of care                 Precautions: Fall Risk  Re-Eval: 3/4/22      Date  2/8 2/11 2/15 2/17   Visit Count   3 4 5   FOTO        Pain In        Pain Out          Manuals 22 Pt arrived 15 min late                                      Neuro Re-Ed         SLS        Tandem Stance        Sidestepping    ADL  <> 1x counter  Cues hand placement, WS, postural awareness   CG/Lizzy // 2 5 x  W/ 1 brief seated rest  Turn negotiation in //     Static standing  3 min  With CGA,  Cues for postural awareness with divided attention   4 min  With CGA,  Cues for postural awareness with divided attention 5 min with dynamic reaching with cones   ADL  Cues WS/postural awareness    CS/CGA Resume  5 min with dynamic reaching with cones   ADL  Cues WS/postural awareness    CS/CGA                           Ther Ex        NuStep    2x 5 min  L2/3  Cues pacing, UE/LE ext    CGA with transfer w/ RW  Cues hand placement 11 min  L3  Cues pacing, UE/LE ext    CGA with transfer w/ RW  Cues hand placement   Lying prone to promote hip extension  Attempted, unable      Core Strengthening   Seated EOB  5 min  W/o  *     MTP/LTP   Michael  Seated     LTP 4#  2x10    MTP 5#  2x10    Bicep 2# DB  2x10    Tricep  7#  2x10    Cues AH  Postural awareness   resume    STS  5x   Cues   CS/CGA 5x   Cues   CS/CGA                     HEP   MTP/LTP/Bicep/tricep  Iman Jackson TB issued     Ther Activity        Steps                 Gait Training        With ' x 2  50' x 1 with RW and CGA, w/c follow 25' x 1 with RW and CGA, w/c follow 100' x 2  50' x 1 with RW and CGA, w/c follow 76' x 1  72' x1  W/ RW   CGA w/ WC follow  Cues  WS, AD placement, postural awareness  104' x 1  72' x 1    W/ RW   CGA w/ WC follow  Cues  WS, AD placement, step stride, postural awareness            Modalities

## 2022-02-16 NOTE — PROGRESS NOTES
Assessment:  1  Chronic pain syndrome    2  Above-knee amputation of right lower extremity ([address] )    3  Type 2 diabetes mellitus with diabetic polyneuropathy, with long-term current use of insulin ([address] )        Plan:  While the patient was in the office today, I did have a thorough conversation regarding their chronic pain syndrome, medication management, and treatment plan options  Patient is being seen for follow-up visit  He was last seen here on 01/05/2022  At that time his Lyrica was decreased to 75 mg 3 times daily at the request of the provider at the methadone clinic where he is treated  They would like to see if he could be weaned off of Lyrica  Cymbalta was increased to 60 mg daily during that same visit  Overall, he reports that his pain has remained pretty stable  Pain is no worse with the decreased dose of Lyrica  At this point, we will plan to decrease Lyrica again to 50 mg 3 times daily  New prescription was sent to his pharmacy  Continue Cymbalta 60 mg daily  A prescription was sent to his pharmacy with refills  The patient will follow-up in 1 month for medication prescription refill and reevaluation  The patient was advised to contact the office should their symptoms worsen in the interim  The patient was agreeable and verbalized an understanding  History of Present Illness: The patient is a 59 y o  male who presents for a follow up office visit in regards to Knee Pain and Arm Pain  The patients current symptoms include complaints of stomach pain, right upper extremity pain  Current pain level is a 5/10  Quality pain is described as dull, aching, throbbing, cramping  Current pain medications includes:  Lyrica 75 mg 3 times daily, Cymbalta 60 mg daily   The patient reports that this regimen is providing up to 80 % pain relief  The patient is reporting no side effects from this pain medication regimen      I have personally reviewed and/or updated the patient's past medical history, past surgical history, family history, social history, current medications, allergies, and vital signs today  Review of Systems  Review of Systems   Gastrointestinal: Positive for constipation and vomiting  Musculoskeletal: Positive for gait problem  Pain in extremity- amputated leg pain, cramping     Neurological:        Memory loss     All other systems reviewed and are negative  Past Medical History:   Diagnosis Date    Arthritis     Diabetes mellitus ([facility] )     GERD (gastroesophageal reflux disease)     Hypercholesteremia     Hypertension     Ischemia of right lower extremity with suspected rhabdomyolysis 2/6/2021    Left Hydropneumothorax 2/10/2021    Methadone use     Pneumonia due to COVID-19 virus 1/11/2021    Subacute osteomyelitis of right femur ([facility] ) 9/13/2021       Past Surgical History:   Procedure Laterality Date    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    Procedure: AMPUTATION ABOVE KNEE (AKA);   Surgeon: Giancarlo Arita MD;  Location: [facility];  Service: Vascular    AMPUTATION ABOVE KNEE (AKA) Right 1/18/2021    Procedure: AMPUTATION ABOVE KNEE (AKA) FORMALIZATION,  R AKA wound washout, wound closure;  Surgeon: Giancarlo Arita MD;  [facility];  Service: Vascular    ARTERIOGRAM Right 1/13/2021    Procedure: ARTERIOGRAM;  Surgeon: Jorge Elkins DO;  [facility];  Service: Vascular    IR CHEST TUBE PLACEMENT  2/10/2021    IR LOWER EXTREMITY ANGIOGRAM  1/14/2021    THROMBECTOMY W/ EMBOLECTOMY Right 1/13/2021    Procedure: EMBOLECTOMY/THROMBECTOMY LOWER EXTREMITY;  Surgeon: Jorge Elkins DO;  [facility]: [facility];  Service: Vascular       Family History   Problem Relation Age of Onset    Diabetes Mother     Hypertension Father     Leukemia Father     Acute lymphoblastic leukemia Father     Cancer Sister        Social History     Occupational History    Not on file   Tobacco Use    Smoking status: Former Smoker  Smokeless tobacco: Never Used   Vaping Use    Vaping Use: Never used   Substance and Sexual Activity    Alcohol use: Not Currently    Drug use: No     Comment: methadone    Sexual activity: Not on file         Current Outpatient Medications:     acetaminophen (TYLENOL) 325 mg tablet, Take 3 tablets (975 mg total) by mouth every 8 (eight) hours (Patient taking differently: Take 650 mg by mouth 3 (three) times a day as needed for moderate pain ), Disp: , Rfl: 0    albuterol (PROVENTIL HFA,VENTOLIN HFA) 90 mcg/act inhaler, Inhale 2 puffs every 4 (four) hours as needed for wheezing, Disp: , Rfl: 0    BD Veo Insulin Syringe U/F 31G X 15/64" 0 5 ML MISC, USE AS DIRECTED, Disp: 100 each, Rfl: 0    Blood Glucose Monitoring Suppl (OneTouch Verio) w/Device KIT, Use to test blood sugars 3 times daily, Disp: 1 kit, Rfl: 0    cholecalciferol (VITAMIN D3) 1,000 units tablet, Take 2 tablets (2,000 Units total) by mouth daily, Disp: 12 tablet, Rfl: 0    Continuous Blood Gluc  (FreeStyle Ke 2 [place]) JOSEPH, Use as directed, Disp: 1 each, Rfl: 0    Continuous Blood Gluc Sensor (FreeStyle Ke 2 Sensor) MISC, Change every 14 days, Disp: 6 each, Rfl: 1    Diclofenac Sodium (VOLTAREN) 1 %, Apply 4 g topically 4 (four) times a day (Patient taking differently: Apply 4 g topically 4 (four) times a day as needed ), Disp: , Rfl: 0    DULoxetine (CYMBALTA) 60 mg delayed release capsule, Take 1 capsule (60 mg total) by mouth daily at bedtime, Disp: 30 capsule, Rfl: 2    Eliquis 5 MG, Take 1 tablet by mouth twice daily, Disp: 180 tablet, Rfl: 2    Empagliflozin (Jardiance) 25 MG TABS, Take 1 tablet (25 mg total) by mouth every morning, Disp: 90 tablet, Rfl: 3    fluticasone (FLONASE) 50 mcg/act nasal spray, 1 spray into each nostril daily, Disp: 16 g, Rfl: 2    glucose blood test strip, Use 1 each 3 (three) times daily after meals Use as instructed, Disp: 270 each, Rfl: 3    insulin glargine (LANTUS) 100 units/mL subcutaneous injection, Inject 25 Units under the skin daily at bedtime, Disp: , Rfl:     insulin lispro (HumaLOG KwikPen) 100 units/mL injection pen, Blood Glucose 150 - 224: 3 Unit of Insulin Blood Glucose 225 - 299: 4 Units of Insulin Blood Glucose 300 - 374: 5 Units of Insulin Blood Glucose 375 - 449: 6 Units of Insulin Blood Glucose greater than or equal to 450: 7 Units of Insulin, Disp: 15 mL, Rfl: 3    Insulin Pen Needle 31G X 5 MM MISC, Use daily Pt to inject 4 X daily, Disp: 100 each, Rfl: 5    Insulin Pen Needle 31G X 5 MM MISC, 30 units sq 2X daily, Disp: 100 each, Rfl: 3    lansoprazole (PREVACID) 30 mg capsule, Take 1 capsule (30 mg total) by mouth daily, Disp: 30 capsule, Rfl: 0    levocetirizine (XYZAL) 5 MG tablet, TAKE 1 TABLET BY MOUTH ONCE DAILY IN THE EVENING, Disp: 30 tablet, Rfl: 0    lidocaine (LMX) 4 % cream, Apply topically 4 (four) times a day as needed for mild pain, Disp: 30 g, Rfl: 0    lisinopril (ZESTRIL) 10 mg tablet, Take 1 tablet (10 mg total) by mouth daily, Disp: 30 tablet, Rfl: 0    magnesium oxide (MAG-OX) 400 mg, Take 1 tablet (400 mg total) by mouth 2 (two) times a day, Disp: 60 tablet, Rfl: 0    menthol-methyl salicylate (BENGAY) 36-33 % cream, Apply topically 4 (four) times a day as needed (torticollis), Disp: , Rfl: 0    methadone (DOLOPHINE) 10 mg tablet, Take 70 mg by mouth daily,, Disp: , Rfl:     multivitamin-minerals (CENTRUM ADULTS) tablet, Take 1 tablet by mouth daily, Disp: , Rfl: 0    naloxone (NARCAN) 4 mg/0 1 mL nasal spray, Administer 1 spray into a nostril   If no response after 2-3 minutes, give another dose in the other nostril using a new spray , Disp: 1 each, Rfl: 1    pregabalin (LYRICA) 50 mg capsule, Take 1 capsule (50 mg total) by mouth 3 (three) times a day, Disp: 90 capsule, Rfl: 1    silver sulfadiazine (SILVADENE,SSD) 1 % cream, Apply topically daily Apply as directed to left heel ulcer daily with each dressing change , Disp: 25 g, Rfl: 2    sitaGLIPtin-metFORMIN (JANUMET)  MG per tablet, Take 1 tablet by mouth 2 (two) times a day with meals, Disp: 60 tablet, Rfl: 0    Allergies   Allergen Reactions    Varenicline        Physical Exam:    /72   Pulse 88   Ht 5' 8" (1 727 m)   BMI 27 06 kg/m²     Constitutional:normal, well developed, well nourished, alert, in no distress and non-toxic and no overt pain behavior  Eyes:anicteric  HEENT:grossly intact  Neck:supple, symmetric, trachea midline and no masses   Pulmonary:even and unlabored  Cardiovascular:No edema or pitting edema present  Skin:Normal without rashes or lesions and well hydrated  Psychiatric:Mood and affect appropriate  Neurologic:Cranial Nerves II-XII grossly intact  Musculoskeletal:normal    Imaging  No orders to display       No orders of the defined types were placed in this encounter

## 2022-02-17 ENCOUNTER — OFFICE VISIT (OUTPATIENT)
Dept: PHYSICAL THERAPY | Facility: CLINIC | Age: 62
End: 2022-02-17
Payer: COMMERCIAL

## 2022-02-17 DIAGNOSIS — Z89.611 HX OF AKA (ABOVE KNEE AMPUTATION), RIGHT (HCC): Primary | ICD-10-CM

## 2022-02-17 DIAGNOSIS — M21.372 FOOT DROP, LEFT: ICD-10-CM

## 2022-02-17 PROCEDURE — 97116 GAIT TRAINING THERAPY: CPT

## 2022-02-17 PROCEDURE — 97112 NEUROMUSCULAR REEDUCATION: CPT

## 2022-02-17 PROCEDURE — 97110 THERAPEUTIC EXERCISES: CPT

## 2022-02-21 ENCOUNTER — APPOINTMENT (OUTPATIENT)
Dept: LAB | Facility: CLINIC | Age: 62
End: 2022-02-21
Payer: COMMERCIAL

## 2022-02-21 DIAGNOSIS — E11.42 TYPE 2 DIABETES MELLITUS WITH DIABETIC POLYNEUROPATHY, UNSPECIFIED WHETHER LONG TERM INSULIN USE (HCC): ICD-10-CM

## 2022-02-21 DIAGNOSIS — E78.2 MIXED HYPERLIPIDEMIA: ICD-10-CM

## 2022-02-21 LAB
ALBUMIN SERPL BCP-MCNC: 3.8 G/DL (ref 3.5–5)
ALP SERPL-CCNC: 123 U/L (ref 46–116)
ALT SERPL W P-5'-P-CCNC: 33 U/L (ref 12–78)
ANION GAP SERPL CALCULATED.3IONS-SCNC: 6 MMOL/L (ref 4–13)
AST SERPL W P-5'-P-CCNC: 23 U/L (ref 5–45)
BILIRUB SERPL-MCNC: 1.09 MG/DL (ref 0.2–1)
BUN SERPL-MCNC: 28 MG/DL (ref 5–25)
CALCIUM SERPL-MCNC: 10.2 MG/DL (ref 8.3–10.1)
CHLORIDE SERPL-SCNC: 103 MMOL/L (ref 100–108)
CHOLEST SERPL-MCNC: 227 MG/DL
CO2 SERPL-SCNC: 27 MMOL/L (ref 21–32)
CREAT SERPL-MCNC: 1.16 MG/DL (ref 0.6–1.3)
EST. AVERAGE GLUCOSE BLD GHB EST-MCNC: 203 MG/DL
GFR SERPL CREATININE-BSD FRML MDRD: 66 ML/MIN/1.73SQ M
GLUCOSE P FAST SERPL-MCNC: 165 MG/DL (ref 65–99)
HBA1C MFR BLD: 8.7 %
HDLC SERPL-MCNC: 34 MG/DL
LDLC SERPL CALC-MCNC: 133 MG/DL (ref 0–100)
NONHDLC SERPL-MCNC: 193 MG/DL
POTASSIUM SERPL-SCNC: 5.1 MMOL/L (ref 3.5–5.3)
PROT SERPL-MCNC: 9.3 G/DL (ref 6.4–8.2)
SODIUM SERPL-SCNC: 136 MMOL/L (ref 136–145)
TRIGL SERPL-MCNC: 300 MG/DL

## 2022-02-21 PROCEDURE — 3052F HG A1C>EQUAL 8.0%<EQUAL 9.0%: CPT | Performed by: NURSE PRACTITIONER

## 2022-02-21 PROCEDURE — 80053 COMPREHEN METABOLIC PANEL: CPT

## 2022-02-21 PROCEDURE — 36415 COLL VENOUS BLD VENIPUNCTURE: CPT

## 2022-02-21 PROCEDURE — 83036 HEMOGLOBIN GLYCOSYLATED A1C: CPT

## 2022-02-21 PROCEDURE — 80061 LIPID PANEL: CPT

## 2022-02-22 NOTE — PROGRESS NOTES
Daily Note     Today's date: 2022  Patient name: Gary Slater  : 1960  MRN: [id number]  Referring provider: Homer Catalan DO  Dx:   Encounter Diagnosis     ICD-10-CM    1  Hx of AKA (above knee amputation), right ([facility] )  [address]    2  Foot drop, left  M21 372                   Subjective: I am not consistent we wearing shrink wrapper  Some days I wear all day long and other days not at all  I am trying to stand every day when not busy  Not walking at home      Objective: See treatment diary below      Assessment: Tolerated treatment fairly well  Noted 2 slight skin tears R inguinal region  Pt to monitor with proper prosthetic fitting, encourage upright trunk posture with standing/walking   Pt conts to demonstrate anterior trunk flexion with standing/gait   Patient demonstrated fatigue post treatment      Plan: Continue per plan of care                 Precautions: Fall Risk  Re-Eval: 3/4/22      Date        Visit Count 6       FOTO        Pain In        Pain Out          Manuals                                        Neuro Re-Ed         SLS        Tandem Stance        Sidestepping Resume  // 2 5 x  W/ 1 brief seated rest  Turn negotiation in //         Static standing Resume  5 min with dynamic reaching with cones   ADL  Cues WS/postural awareness    CS/CGA                               Ther Ex        NuStep 11 min  L3  Cues pacing, UE/LE ext    CGA with transfer w/ RW  Cues hand placement       Lying prone to promote hip extension        Core Strengthening         MTP/LTP [place] NA  Seated     LTP Black TB  3x10    MTP Black TB  3x10    Bicep 2# DB  3x10    Tricep  Black TB  3x10    Cues AH  Postural awareness       STS                        HEP        Ther Activity        Steps                 Gait Training        With RW 80' x 1    W/ RW   CGA w/ WC follow  Cues  WS, AD placement, step stride, postural awareness                Modalities

## 2022-02-23 ENCOUNTER — OFFICE VISIT (OUTPATIENT)
Dept: PHYSICAL THERAPY | Facility: CLINIC | Age: 62
End: 2022-02-23
Payer: COMMERCIAL

## 2022-02-23 DIAGNOSIS — Z89.611 HX OF AKA (ABOVE KNEE AMPUTATION), RIGHT (HCC): Primary | ICD-10-CM

## 2022-02-23 DIAGNOSIS — M21.372 FOOT DROP, LEFT: ICD-10-CM

## 2022-02-23 PROCEDURE — 97110 THERAPEUTIC EXERCISES: CPT

## 2022-02-23 PROCEDURE — 97116 GAIT TRAINING THERAPY: CPT

## 2022-02-23 NOTE — PROGRESS NOTES
Daily Note     Today's date: 2022  Patient name: Hank Bowman  : 1960  MRN: [id number]  Referring provider: Alesia Meier DO  Dx:   Encounter Diagnosis     ICD-10-CM    1  Hx of AKA (above knee amputation), right ([facility] [address] )  [address]89 611    2  Foot drop, left  M21 372                   Subjective: I am tired today  I just came from MD office, running around this afternoon  I was wearing my  all day      Objective: See treatment diary below      Assessment: Tolerated treatment fairly well  Noted increase SOB with ambulation/standing TE/neuro exercises  Pt provide frequent cues with standing exercises 1* postural awareness/WS, with CS/Lizzy  Patient would benefit from continued PT      Plan: Continue per plan of care                 Precautions: Fall Risk  Re-Eval: 3/4/22      Date       Visit Count 6 7      FOTO        Pain In        Pain Out          Manuals                                        Neuro Re-Ed         SLS        Tandem Stance        Sidestepping Resume  // 2 5 x  W/ 1 brief seated rest  Turn negotiation in //   // 2 5 x  W/ 1 brief seated rest  Turn negotiation in //      Static standing Resume  5 min with dynamic reaching with cones   ADL  Cues WS/postural awareness    CS/CGA 5x to nataliya w/o UE support  Firm/EO  CS/CGA                              Ther Ex        NuStep 11 min  L3  Cues pacing, UE/LE ext    CGA with transfer w/ RW  Cues hand placement L3  10 min  Stand/pivot transfer  CGA      Lying prone to promote hip extension        Core Strengthening         MTP/LTP [place] NA  Seated     LTP Black TB  3x10    MTP Black TB  3x10    Bicep 2# DB  3x10    Tricep  Black TB  3x10    Cues AH  Postural awareness resume      STS                        HEP        Ther Activity        Steps                 Gait Training        With RW 80' x 1    W/ RW   CGA w/ WC follow  Cues  WS, AD placement, step stride, postural awareness  104' x 1    W/ RW   CGA w/ WC follow  Cues  WS, AD placement, step stride, postural awareness               Modalities

## 2022-02-24 ENCOUNTER — OFFICE VISIT (OUTPATIENT)
Dept: FAMILY MEDICINE CLINIC | Facility: CLINIC | Age: 62
End: 2022-02-24
Payer: COMMERCIAL

## 2022-02-24 ENCOUNTER — OFFICE VISIT (OUTPATIENT)
Dept: PHYSICAL THERAPY | Facility: CLINIC | Age: 62
End: 2022-02-24
Payer: COMMERCIAL

## 2022-02-24 VITALS
HEART RATE: 87 BPM | WEIGHT: 180 LBS | TEMPERATURE: 97.2 F | BODY MASS INDEX: 27.28 KG/M2 | SYSTOLIC BLOOD PRESSURE: 112 MMHG | DIASTOLIC BLOOD PRESSURE: 78 MMHG | HEIGHT: 68 IN | OXYGEN SATURATION: 99 %

## 2022-02-24 DIAGNOSIS — Z89.611 HX OF AKA (ABOVE KNEE AMPUTATION), RIGHT (HCC): Primary | ICD-10-CM

## 2022-02-24 DIAGNOSIS — E11.65 TYPE 2 DIABETES MELLITUS WITH HYPERGLYCEMIA, WITH LONG-TERM CURRENT USE OF INSULIN (HCC): Chronic | ICD-10-CM

## 2022-02-24 DIAGNOSIS — Z79.4 TYPE 2 DIABETES MELLITUS WITH HYPERGLYCEMIA, WITH LONG-TERM CURRENT USE OF INSULIN (HCC): Chronic | ICD-10-CM

## 2022-02-24 DIAGNOSIS — E11.69 HYPERLIPIDEMIA ASSOCIATED WITH TYPE 2 DIABETES MELLITUS (HCC): Primary | ICD-10-CM

## 2022-02-24 DIAGNOSIS — E78.5 HYPERLIPIDEMIA ASSOCIATED WITH TYPE 2 DIABETES MELLITUS (HCC): Primary | ICD-10-CM

## 2022-02-24 DIAGNOSIS — M21.372 FOOT DROP, LEFT: ICD-10-CM

## 2022-02-24 PROCEDURE — 97116 GAIT TRAINING THERAPY: CPT

## 2022-02-24 PROCEDURE — 99214 OFFICE O/P EST MOD 30 MIN: CPT | Performed by: NURSE PRACTITIONER

## 2022-02-24 PROCEDURE — 97112 NEUROMUSCULAR REEDUCATION: CPT

## 2022-02-24 PROCEDURE — 97110 THERAPEUTIC EXERCISES: CPT

## 2022-02-24 RX ORDER — ATORVASTATIN CALCIUM 20 MG/1
20 TABLET, FILM COATED ORAL DAILY
Qty: 90 TABLET | Refills: 3 | Status: SHIPPED | OUTPATIENT
Start: 2022-02-24

## 2022-02-24 NOTE — PROGRESS NOTES
Assessment/Plan:    No problem-specific Assessment & Plan notes found for this encounter  Diagnoses and all orders for this visit:    Hyperlipidemia associated with type 2 diabetes mellitus (HCC)  -     atorvastatin (LIPITOR) 20 mg tablet; Take 1 tablet (20 mg total) by mouth daily  -     Lipid panel; Future  -     Comprehensive metabolic panel; Future    Type 2 diabetes mellitus with hyperglycemia, with long-term current use of insulin (HCC)          Subjective:      Patient ID: Quinten Trujillo is a 59 y o  male  Patient presents for routine follow up  Overall, patient is doing well  He is following with endocrinology for control of his diabetes  However, he was not taking his insulin before meals as instructed  Provided education to patient at length on how and when to take his insulin  Also, counseled on carbohydrate intake  He is to schedule f/u with them in April  He states that he had requested a continuous monitor  Will reach out to endo for update  Hyperlipidemia- cholesterol is elevated  He is not currently on a statin  Unsure of who stopped it or when it was stopped  Will restart  Counseled on low fat diet  Will repeat labs in 3 months  The following portions of the patient's history were reviewed and updated as appropriate: allergies, current medications, past family history, past medical history, past social history, past surgical history and problem list     Review of Systems   Constitutional: Negative for activity change, appetite change, chills and fever  HENT: Negative for ear pain, rhinorrhea, sinus pressure, sinus pain, sneezing and sore throat  Eyes: Negative for pain and visual disturbance  Respiratory: Negative for cough and shortness of breath  Cardiovascular: Negative for chest pain and palpitations  Gastrointestinal: Negative for abdominal pain, blood in stool, constipation, diarrhea and vomiting     Endocrine: Negative for cold intolerance, heat intolerance, polydipsia, polyphagia and polyuria  Genitourinary: Negative for dysuria and hematuria  Musculoskeletal: Negative for arthralgias and back pain  Skin: Negative for color change and rash  Neurological: Negative for seizures and syncope  All other systems reviewed and are negative  Objective:      /78   Pulse 87   Temp (!) 97 2 °F (36 2 °C)   Ht 5' 8" (1 727 m)   Wt 81 6 kg (180 lb)   SpO2 99%   BMI 27 37 kg/m²          Physical Exam  Vitals and nursing note reviewed  Constitutional:       General: He is not in acute distress  Appearance: Normal appearance  He is not ill-appearing or toxic-appearing  Cardiovascular:      Rate and Rhythm: Normal rate and regular rhythm  Pulses: Normal pulses  Heart sounds: Normal heart sounds  No murmur heard  No friction rub  No gallop  Pulmonary:      Effort: Pulmonary effort is normal  No respiratory distress  Breath sounds: Normal breath sounds  No wheezing or rales  Abdominal:      General: Abdomen is flat  Bowel sounds are normal  There is no distension  Palpations: Abdomen is soft  Tenderness: There is no abdominal tenderness  There is no guarding  Hernia: No hernia is present  Musculoskeletal:      Cervical back: Normal range of motion  No rigidity or tenderness  Comments: Right AKA    Lymphadenopathy:      Cervical: No cervical adenopathy  Skin:     Findings: No bruising, erythema or lesion  Neurological:      General: No focal deficit present  Mental Status: He is alert and oriented to person, place, and time  Mental status is at baseline  Motor: No weakness  Coordination: Coordination normal       Gait: Gait normal    Psychiatric:         Mood and Affect: Mood normal          Behavior: Behavior normal          Thought Content:  Thought content normal          Judgment: Judgment normal

## 2022-02-25 ENCOUNTER — TELEPHONE (OUTPATIENT)
Dept: ENDOCRINOLOGY | Facility: CLINIC | Age: 62
End: 2022-02-25

## 2022-03-03 ENCOUNTER — OFFICE VISIT (OUTPATIENT)
Dept: PHYSICAL THERAPY | Facility: CLINIC | Age: 62
End: 2022-03-03
Payer: COMMERCIAL

## 2022-03-03 DIAGNOSIS — Z89.611 HX OF AKA (ABOVE KNEE AMPUTATION), RIGHT (HCC): Primary | ICD-10-CM

## 2022-03-03 DIAGNOSIS — M21.372 FOOT DROP, LEFT: ICD-10-CM

## 2022-03-03 PROCEDURE — 97116 GAIT TRAINING THERAPY: CPT

## 2022-03-03 PROCEDURE — 97112 NEUROMUSCULAR REEDUCATION: CPT

## 2022-03-03 PROCEDURE — 97110 THERAPEUTIC EXERCISES: CPT

## 2022-03-03 NOTE — PROGRESS NOTES
Daily Note     Today's date: 3/3/2022  Patient name: Hank Bowman  : 1960  MRN: [id number]  Referring provider: Alesia Meier DO  Dx:   Encounter Diagnosis     ICD-10-CM    1  Hx of AKA (above knee amputation), right ([address] )  [address]89 611    2  Foot drop, left  M21 372                   Subjective: I have been having increase irriatation/cutting into R groin region  May reach out to prosthetist for modification of prosthetic        Objective: See treatment diary below      Assessment: Tolerated treatment well  Skin check end of rx session with no visible sign of irritation/redness R groin region  Pt with improved standing tolerance  Remains guarded with dynamic reaching  Pt encourage carryover with HEP/progress with standing nataliya/ WS  Pt remains fearful to ambulate in home with S with live in friend  Patient would benefit from continued PT      Plan: Continue per plan of care                 Precautions: Fall Risk  Re-Eval: 3/4/22      Date 2/23 2/24 3/3     Visit Count 6 7 8     FOTO        Pain In        Pain Out          Manuals                                        Neuro Re-Ed         SLS        Tandem Stance        Sidestepping Resume  // 2 5 x  W/ 1 brief seated rest  Turn negotiation in //   // 2 5 x  W/ 1 brief seated rest  Turn negotiation in // // 1 5 x  W/ 1 brief seated rest  Turn negotiation in //     Static standing Resume  5 min with dynamic reaching with cones   ADL  Cues WS/postural awareness    CS/CGA 5x to nataliya w/ UE support  Firm/EO  CS/CGA 5x to nataliya w/ UE support  Firm/EO  CS/CGA    Dynamic reach  ADL suite  Cones/letters  2 trials to nataliya  1 min 52 sec  2 min 5 sec                             Ther Ex        NuStep 11 min  L3  Cues pacing, UE/LE ext    CGA with transfer w/ RW  Cues hand placement L3  10 min  Stand/pivot transfer  CGA L3  10 min  Stand/pivot transfer  CGA     Lying prone to promote hip extension        Core Strengthening         MTP/LTP Michael NA  Seated     LTP Black TB  3x10    MTP Black TB  3x10    Bicep 2# DB  3x10    Tricep  Black TB  3x10    Cues AH  Postural awareness resume      STS                        HEP        Ther Activity        Steps                 Gait Training        With RW 80' x 1    W/ RW   CGA w/ WC follow  Cues  WS, AD placement, step stride, postural awareness  104' x 1    W/ RW   CGA w/ WC follow  Cues  WS, AD placement, step stride, postural awareness  87' x1  92' x1    W/ RW   CGA w/ WC follow  Cues  WS, AD placement, step stride, postural awareness              Modalities

## 2022-03-07 NOTE — PROGRESS NOTES
Daily Note     Today's date: 3/8/2022  Patient name: Dayna Luna  : 1960  MRN: [id number]  Referring provider: Jorge Franklin DO  Dx:   Encounter Diagnosis     ICD-10-CM    1  Foot drop, left  M21 372                   Subjective: I am not feeling confident walking at home But standing now 6-7 min        Objective: See treatment diary below      Assessment: Tolerated treatment well  Denied any increase groin discomfort from R prosthetic  Pt reporting wanting to f/u to discuss possible changes for occ R groin irritation  Pt conts with no carryover with ambulation in home setting 2* pt reporting fear of falling on home carpet / surface   Patient would benefit from continued PT      Plan: Continue per plan of care                 Precautions: Fall Risk  Re-Eval: 3/4/22      Date 2/23 2/24 3/3 3/8    Visit Count 6 7 8 9    FOTO        Pain In        Pain Out          Manuals                                        Neuro Re-Ed         SLS        Tandem Stance        Sidestepping Resume  // 2 5 x  W/ 1 brief seated rest  Turn negotiation in //   // 2 5 x  W/ 1 brief seated rest  Turn negotiation in // // 1 5 x  W/ 1 brief seated rest  Turn negotiation in //     Static standing Resume  5 min with dynamic reaching with cones   ADL  Cues WS/postural awareness    CS/CGA 5x to nataliya w/ UE support  Firm/EO  CS/CGA 5x to nataliya w/ UE support  Firm/EO  CS/CGA    Dynamic reach  ADL suite  Cones/letters  2 trials to nataliya  1 min 52 sec  2 min 5 sec 5x to nataliya w/ UE support  Firm/EO  CS/CGA    Dynamic reach  ADL suite  Varied levels  2 trials to nataliya  1 min 42 sec  2 min 7 sec                            Ther Ex        NuStep 11 min  L3  Cues pacing, UE/LE ext    CGA with transfer w/ RW  Cues hand placement L3  10 min  Stand/pivot transfer  CGA L3  10 min  Stand/pivot transfer  CGA L3  10 min  Stand/pivot transfer  CGA    Lying prone to promote hip extension        Core Strengthening         MTP/LTP Michael NA  Seated     LTP Black TB  3x10    MTP Black TB  3x10    Bicep 2# DB  3x10    Tricep  Black TB  3x10    Cues AH  Postural awareness resume      STS                        HEP        Ther Activity        Steps                 Gait Training        With RW 80' x 1    W/ RW   CGA w/ WC follow  Cues  WS, AD placement, step stride, postural awareness  104' x 1    W/ RW   CGA w/ WC follow  Cues  WS, AD placement, step stride, postural awareness  80' x1  92' x1    W/ RW   CGA w/ WC follow  Cues  WS, AD placement, step stride, postural awareness  80' x1  98' x1  100' x1    W/ RW   CGA w/ WC follow  Cues  WS, AD placement, step stride, postural awareness             Modalities

## 2022-03-08 ENCOUNTER — OFFICE VISIT (OUTPATIENT)
Dept: PHYSICAL THERAPY | Facility: CLINIC | Age: 62
End: 2022-03-08
Payer: COMMERCIAL

## 2022-03-08 DIAGNOSIS — M21.372 FOOT DROP, LEFT: Primary | ICD-10-CM

## 2022-03-08 PROCEDURE — 97116 GAIT TRAINING THERAPY: CPT

## 2022-03-08 PROCEDURE — 97110 THERAPEUTIC EXERCISES: CPT

## 2022-03-08 PROCEDURE — 97112 NEUROMUSCULAR REEDUCATION: CPT

## 2022-03-08 NOTE — PLAN OF CARE
Department of Interventional Radiology  [phone]    History and Physical    Patient:  Tsering Marcus MRN:  [id number]  SSN:  [id number]    YOB: 1954  Age:  69 y.o. Sex:  female      Primary Care Provider:  Jaimie Colbert  Referring Physician:  SHAWNA Vargas    Subjective:     Chief Complaint: port    History of the Present Illness: The patient is a 69 y.o. female with bladder cancer who presents for venous chest port placement. NPO. Past Medical History:   Diagnosis Date    Cancer [facility]) 12/2021    bladder     Chronic pain     Back    Heart failure ([address]     Echo on 02/11/2022 showed Reduced left ventricular systolic function with a visually estimated EF of 45 - 50%. Normal diastolic function.  Sleep apnea     can not destinee c pap at hs     Past Surgical History:   Procedure Laterality Date    HX APPENDECTOMY      HX HERNIA REPAIR      HX HYSTERECTOMY      HX LAP CHOLECYSTECTOMY      HX ORTHOPAEDIC  1984    toe transplant--- toe removed from left foot and placed on left hand    IR NEPHROSTOMY PERC RT PLC CATH  SI  2/24/2022    NEUROLOGICAL PROCEDURE UNLISTED      back surgery        Review of Systems:    Pertinent items are noted in the History of Present Illness. Prior to Admission medications    Medication Sig Start Date End Date Taking? Authorizing Provider   HYDROcodone-acetaminophen (NORCO) 5-325 mg per tablet Take 1 Tablet by mouth every eight (8) hours as needed for Pain for up to 7 days. Max Daily Amount: 3 Tablets. 3/4/22 3/11/22 Yes Keo Castillo MD   magnesium oxide (MAG-OX) 400 mg tablet Take 1 Tablet by mouth two (2) times a day.  3/4/22  Yes Jerry Chen MD        Allergies   Allergen Reactions    Doxycycline Rash     Other reaction(s): Rash-Allergy    Fentanyl Rash     Other reaction(s): Rash-Allergy    Morphine Rash     Other reaction(s): Rash-Allergy       Family History   Problem Relation Age of Onset    Cancer Mother Problem: Potential for Falls  Goal: Patient will remain free of falls  Description: INTERVENTIONS:  - Assess patient frequently for physical needs  -  Identify cognitive and physical deficits and behaviors that affect risk of falls    -  [place] fall precautions as indicated by assessment   - Educate patient/family on patient safety including physical limitations  - Instruct patient to call for assistance with activity based on assessment  - Modify environment to reduce risk of injury  - Consider OT/PT consult to assist with strengthening/mobility  Outcome: Progressing     Problem: Prexisting or High Potential for Compromised Skin Integrity  Goal: Skin integrity is maintained or improved  Description: INTERVENTIONS:  - Identify patients at risk for skin breakdown  - Assess and monitor skin integrity  - Assess and monitor nutrition and hydration status  - Monitor labs   - Assess for incontinence   - Turn and reposition patient  - Assist with mobility/ambulation  - Relieve pressure over bony prominences  - Avoid friction and shearing  - Provide appropriate hygiene as needed including keeping skin clean and dry  - Evaluate need for skin moisturizer/barrier cream  - Collaborate with interdisciplinary team   - Patient/family teaching  - Consider wound care consult   Outcome: Progressing     Problem: PAIN - ADULT  Goal: Verbalizes/displays adequate comfort level or baseline comfort level  Description: Interventions:  - Encourage patient to monitor pain and request assistance  - Assess pain using appropriate pain scale  - Administer analgesics based on type and severity of pain and evaluate response  - Implement non-pharmacological measures as appropriate and evaluate response  - Consider cultural and social influences on pain and pain management  - Notify physician/advanced practitioner if interventions unsuccessful or patient reports new pain  Outcome: Progressing     Problem: SAFETY ADULT  Goal: Patient will remain colon    Cancer Father         lung    Cancer Brother         colon with mets to bladder    Cancer Paternal Uncle      Social History     Tobacco Use    Smoking status: Former Smoker     Packs/day: 1.00     Years: 50.00     Pack years: 50.00     Quit date:      Years since quittin.1    Smokeless tobacco: Never Used    Tobacco comment: began smoking age 13   Substance Use Topics    Alcohol use: Never        Objective:       Physical Examination:    Vitals:    22 1104   BP: (!) 115/56   Pulse: (!) 110   Resp: 16   Temp: 97.7 °F (36.5 °C)   SpO2: 95%   Weight: 63 kg (139 lb)   Height: 5' 2\" (1.575 m)       Pain Assessment  Pain Intensity 1: 10 (22 1101)  Pain Location 1: Abdomen,Back   interv: per primary  Patient Stated Pain Goal: 0      HEART: regular rate and rhythm  LUNG: clear to auscultation bilaterally  ABDOMEN: normal findings: soft, non-tender  EXTREMITIES: warm, no edema    Laboratory:     Lab Results   Component Value Date/Time    Sodium 142 2022 05:47 AM    Sodium 143 2022 05:38 AM    Potassium 4.8 2022 05:47 AM    Potassium 3.9 2022 05:38 AM    Chloride 108 (H) 2022 05:47 AM    Chloride 107 2022 05:38 AM    CO2 27 2022 05:47 AM    CO2 34 (H) 2022 05:38 AM    Anion gap 7 2022 05:47 AM    Anion gap 2 (L) 2022 05:38 AM    Glucose 79 2022 05:47 AM    Glucose 88 2022 05:38 AM    BUN 28 (H) 2022 05:47 AM    BUN 30 (H) 2022 05:38 AM    Creatinine 1.00 2022 05:47 AM    Creatinine 1.20 (H) 2022 05:38 AM    GFR est AA >60 2022 05:47 AM    GFR est AA 57 (L) 2022 05:38 AM    GFR est non-AA 58 (L) 2022 05:47 AM    GFR est non-AA 47 (L) 2022 05:38 AM    Calcium 8.8 2022 05:47 AM    Calcium 8.7 2022 05:38 AM    Magnesium 1.7 (L) 2022 05:47 AM    Magnesium 1.8 2022 05:38 AM    Phosphorus 2.9 2022 05:22 AM    Albumin 2.3 (L) 2022 12:50 PM free of falls  Description: INTERVENTIONS:  - Assess patient frequently for physical needs  -  Identify cognitive and physical deficits and behaviors that affect risk of falls    -  [place] fall precautions as indicated by assessment   - Educate patient/family on patient safety including physical limitations  - Instruct patient to call for assistance with activity based on assessment  - Modify environment to reduce risk of injury  - Consider OT/PT consult to assist with strengthening/mobility  Outcome: Progressing  Goal: Maintain or return to baseline ADL function  Description: INTERVENTIONS:  -  Assess patient's ability to carry out ADLs; assess patient's baseline for ADL function and identify physical deficits which impact ability to perform ADLs (bathing, care of mouth/teeth, toileting, grooming, dressing, etc )  - Assess/evaluate cause of self-care deficits   - Assess range of motion  - Assess patient's mobility; develop plan if impaired  - Assess patient's need for assistive devices and provide as appropriate  - Encourage maximum independence but intervene and supervise when necessary  - Involve family in performance of ADLs  - Assess for home care needs following discharge   - Consider OT consult to assist with ADL evaluation and planning for discharge  - Provide patient education as appropriate  Outcome: Progressing  Goal: Maintain or return mobility status to optimal level  Description: INTERVENTIONS:  - Assess patient's baseline mobility status (ambulation, transfers, stairs, etc )    - Identify cognitive and physical deficits and behaviors that affect mobility  - Identify mobility aids required to assist with transfers and/or ambulation (gait belt, sit-to-stand, lift, walker, cane, etc )  - [place] fall precautions as indicated by assessment  - Record patient progress and toleration of activity level on Mobility SBAR; progress patient to next Phase/Stage  - Instruct patient to call for assistance with Albumin 2.8 (L) 12/17/2021 03:03 PM    Protein, total 6.7 02/23/2022 12:50 PM    Protein, total 6.9 12/17/2021 03:03 PM    Globulin 4.4 (H) 02/23/2022 12:50 PM    Globulin 4.1 (H) 12/17/2021 03:03 PM    A-G Ratio 0.5 (L) 02/23/2022 12:50 PM    A-G Ratio 0.7 (L) 12/17/2021 03:03 PM    ALT (SGPT) 7 (L) 02/23/2022 12:50 PM    ALT (SGPT) 11 (L) 12/17/2021 03:03 PM     Lab Results   Component Value Date/Time    WBC 12.1 (H) 03/04/2022 05:47 AM    WBC 12.1 (H) 03/03/2022 05:38 AM    HGB 9.4 (L) 03/04/2022 05:47 AM    HGB 8.1 (L) 03/03/2022 05:38 AM    HCT 30.6 (L) 03/04/2022 05:47 AM    HCT 27.4 (L) 03/03/2022 05:38 AM    PLATELET 749 03/06/7317 05:47 AM    PLATELET 884 91/24/9620 05:38 AM     No results found for: APTT, PTP, INR, INREXT    Assessment:     Bladder cancer    Hospital Problems  Date Reviewed: 2/22/2022    None          Plan:     Planned Procedure:  Port placement    Risks, benefits, and alternatives reviewed with patient and she agrees to proceed with the procedure.       Signed By: Lorrie Espino PA-C     March 8, 2022 activity based on assessment  - Consider rehabilitation consult to assist with strengthening/weightbearing, etc   Outcome: Progressing     Problem: DISCHARGE PLANNING  Goal: Discharge to home or other facility with appropriate resources  Description: INTERVENTIONS:  - Identify barriers to discharge w/patient and caregiver  - Arrange for needed discharge resources and transportation as appropriate  - Identify discharge learning needs (meds, wound care, etc )  - Arrange for interpretive services to assist at discharge as needed  - Refer to Case Management Department for coordinating discharge planning if the patient needs post-hospital services based on physician/advanced practitioner order or complex needs related to functional status, cognitive ability, or social support system  Outcome: Progressing     Problem: SAFETY ADULT  Goal: Patient will remain free of falls  Description: INTERVENTIONS:  - Assess patient frequently for physical needs  -  Identify cognitive and physical deficits and behaviors that affect risk of falls    -  [place] fall precautions as indicated by assessment   - Educate patient/family on patient safety including physical limitations  - Instruct patient to call for assistance with activity based on assessment  - Modify environment to reduce risk of injury  - Consider OT/PT consult to assist with strengthening/mobility  Outcome: Progressing  Goal: Maintain or return to baseline ADL function  Description: INTERVENTIONS:  -  Assess patient's ability to carry out ADLs; assess patient's baseline for ADL function and identify physical deficits which impact ability to perform ADLs (bathing, care of mouth/teeth, toileting, grooming, dressing, etc )  - Assess/evaluate cause of self-care deficits   - Assess range of motion  - Assess patient's mobility; develop plan if impaired  - Assess patient's need for assistive devices and provide as appropriate  - Encourage maximum independence but intervene and supervise when necessary  - Involve family in performance of ADLs  - Assess for home care needs following discharge   - Consider OT consult to assist with ADL evaluation and planning for discharge  - Provide patient education as appropriate  Outcome: Progressing  Goal: Maintain or return mobility status to optimal level  Description: INTERVENTIONS:  - Assess patient's baseline mobility status (ambulation, transfers, stairs, etc )    - Identify cognitive and physical deficits and behaviors that affect mobility  - Identify mobility aids required to assist with transfers and/or ambulation (gait belt, sit-to-stand, lift, walker, cane, etc )  - [place] fall precautions as indicated by assessment  - Record patient progress and toleration of activity level on Mobility SBAR; progress patient to next Phase/Stage  - Instruct patient to call for assistance with activity based on assessment  - Consider rehabilitation consult to assist with strengthening/weightbearing, etc   Outcome: Progressing     Problem: Knowledge Deficit  Goal: Patient/family/caregiver demonstrates understanding of disease process, treatment plan, medications, and discharge instructions  Description: Complete learning assessment and assess knowledge base    Interventions:  - Provide teaching at level of understanding  - Provide teaching via preferred learning methods  Outcome: Progressing     Problem: SKIN/TISSUE INTEGRITY - ADULT  Goal: Skin integrity remains intact  Description: INTERVENTIONS  - Identify patients at risk for skin breakdown  - Assess and monitor skin integrity  - Assess and monitor nutrition and hydration status  - Monitor labs (i e  albumin)  - Assess for incontinence   - Turn and reposition patient  - Assist with mobility/ambulation  - Relieve pressure over bony prominences  - Avoid friction and shearing  - Provide appropriate hygiene as needed including keeping skin clean and dry  - Evaluate need for skin moisturizer/barrier cream  - Collaborate with interdisciplinary team (i e  Nutrition, Rehabilitation, etc )   - Patient/family teaching  Outcome: Progressing  Goal: Incision(s), wounds(s) or drain site(s) healing without S/S of infection  Description: INTERVENTIONS  - Assess and document risk factors for skin impairment   - Assess and document dressing, incision, wound bed, drain sites and surrounding tissue  - Consider nutrition services referral as needed  - Oral mucous membranes remain intact  - Provide patient/ family education  Outcome: Progressing     Problem: MUSCULOSKELETAL - ADULT  Goal: Maintain or return mobility to safest level of function  Description: INTERVENTIONS:  - Assess patient's ability to carry out ADLs; assess patient's baseline for ADL function and identify physical deficits which impact ability to perform ADLs (bathing, care of mouth/teeth, toileting, grooming, dressing, etc )  - Assess/evaluate cause of self-care deficits   - Assess range of motion  - Assess patient's mobility  - Assess patient's need for assistive devices and provide as appropriate  - Encourage maximum independence but intervene and supervise when necessary  - Involve family in performance of ADLs  - Assess for home care needs following discharge   - Consider OT consult to assist with ADL evaluation and planning for discharge  - Provide patient education as appropriate  Outcome: Progressing  Goal: Maintain proper alignment of affected body part  Description: INTERVENTIONS:  - Support, maintain and protect limb and body alignment  - Provide patient/ family with appropriate education  Outcome: Progressing

## 2022-03-10 ENCOUNTER — APPOINTMENT (OUTPATIENT)
Dept: PHYSICAL THERAPY | Facility: CLINIC | Age: 62
End: 2022-03-10
Payer: COMMERCIAL

## 2022-03-10 NOTE — PROGRESS NOTES
PT Re-Evaluation     Today's date: 3/10/22  Patient name: Jonatan Lind  : 1960  MRN: [id number]  Referring provider: Jaycee Quiroz DO  Dx:   No diagnosis found  Assessment  Assessment details: The patient is a 60 y/o male who presents to PT with diagnosis of R AKA and L foot drop  He has complaints of phantom pain and leg cramps  He demonstrates deficits with decreased ROM and strength, decreased flexibility, antalgic gait with use of prosthetic and RW, decreased balance and proprioception, difficulty with stair negotiation, limited standing tolerance and pain and difficulty with completing his ADLs  He lives with his nephew who has been assisting him with ADLs and IADLs  He assists with transfers, bathing and dressing  He is transferring sit to stand with min A x 1  Assist needed to don/doff prosthetic  He ambulates with use of prosthetic and RW with min A x 1 and w/c follow secondary to fatigue  He ambulates with slow emili, decreased step and stride length and verbal cues for weight shifting  He has a MAFO for L foot but has not been using it secondary to it rubs his skin, notes he needs to get it adjusted  He has not been going upstairs at home, has been staying on first floor  He is not driving at this time  Secondary to surgery and above deficits he is limited with his overall mobility and function  Will assess balance is upcoming visits  The patient would benefit from continued PT to address deficits and improve function  Tx to include ROM, stretching, strengthening, modalities, HEP, pt education, postural ed, lifting/body mechanics, neuro re-ed, balance/proprioception Te, MT and equipment        Impairments: abnormal gait, abnormal or restricted ROM, activity intolerance, impaired balance, impaired physical strength, lacks appropriate home exercise program, pain with function, safety issue and weight-bearing intolerance  Other impairment: decreased endurance, decreased standing tolerance  Functional limitations: Assist with dressing, assist with transfers Understanding of Dx/Px/POC: good   Prognosis: good    Goals  STGs:  1  Initiate and complete HEP with verbal cues  2   Improve BLE strength by 1/2 grade in 4 weeks  3   Will assess balance in upcoming visits as set goals  4   Patient will complete transfers with CGA x 1  LTGs:  1  Patient to be I with HEP in 12 weeks  2   Improve BLE strength to > 4 to 4+/5 t/o in 12 weeks to improve function  3   Improve BLE ROM to WNL t/o in 12 weeks to improve function  4   Ambulation is improved to maximal level of function in 12 weeks  5   Stair negotiation is improved to maximal level of function in 12 weeks  6   Recreational performance in related activities is improved to maximal level of function in 12 weeks  7   ADL performance is improved to maximal level of function in 12 weeks  8   Patient will complete transfers mod I           Plan  Plan details: Modalities and therapy interventions prn  Patient would benefit from: skilled physical therapy  Planned modality interventions: cryotherapy and thermotherapy: hydrocollator packs  Planned therapy interventions: manual therapy, balance, balance/weight bearing training, neuromuscular re-education, patient education, postural training, self care, strengthening, stretching, therapeutic activities, therapeutic exercise, flexibility, home exercise program, prosthetic fitting/training, gait training and transfer training  Frequency: 2x week  Duration in weeks: 12  Plan of Care beginning date: 2/2/2022  Plan of Care expiration date: 4/27/2022  Treatment plan discussed with: patient        Subjective Evaluation    History of Present Illness  Mechanism of injury: The patient states that he had R AKA in mid January of last year  He notes he also developed L foot drop around the same time (he does have a brace for his L ankle but does not use it)    He had developed COVID and he threw a clot in his leg  He notes that his leg tissue was dying and he had to have the amputation  He was in the hospital about 1 5 months and then he went to rehab  He was in [place] for rehab and from there he went home  Once he got the prosthetic he went to acute rehab in [place] to learn how to use it  From there he went home and he had home health PT  He was most recently discharged from home health about two weeks ago  He was referred to OPPT and he now presents for his evaluation  He was given a script for OPPT by his PCP, will be following up with them in a few weeks  Pain  At best pain rating: 3  At worst pain rating: 10  Location: R phantom pains, leg cramps   Quality: dull ache, sharp and squeezing    Social Support  Steps to enter house: yes (Ramps to enter )  4  Stairs in house: yes (Bathroom on second floor)   Lives in: multiple-level home (Able to sleep on first floor, has BSC)  Lives with: Nephew  Employment status: not working  Treatments  Previous treatment: physical therapy and home therapy  Discharged from (in last 30 days): home health care  Patient Goals  Patient goals for therapy: increased motion, decreased pain, increased strength, improved balance, independence with ADLs/IADLs and return to sport/leisure activities  Patient goal: "To be able to walk with the walker, to be able to go up the steps "        Objective     Active Range of Motion   Left Hip   Flexion: WFL  Abduction: WFL    Right Hip   Flexion: WFL  Abduction: WFL  Left Knee   Flexion: WFL  Extension: [facility]    Additional Active Range of Motion Details  Ankle ROM limited 50% L ankle    Most difficulty noted with df       Strength/Myotome Testing     Left Hip   Planes of Motion   Flexion: 4+  Abduction: 4  Adduction: 4+    Right Hip   Planes of Motion   Flexion: 4  Abduction: 4-  Adduction: 4    Left Knee   Flexion: 4+  Extension: 4+    Left Ankle/Foot   Dorsiflexion: 2-  Plantar flexion: 2-    Ambulation Weight-Bearing Status   Assistive device used: two-wheeled walker    Additional Weight-Bearing Status Details  DME: BSC, RW, hospital bed   Had been using RW around the house with home health PT  Ambulation: Level Surfaces   Ambulation with assistive device: contact guard assist    Ambulation: Stairs     Additional Stairs Ambulation Details  Patient has not been doing the steps  Observational Gait   Decreased walking speed, stride length, left step length and right step length  Base of support: decreased    Additional Observational Gait Details  Step to gait pattern, verbal cues for weight shift to advance leg      Neuro Exam:     Wheelchair mobility   WC mobility: yesPropelling: independent Turning: dependent     Transfers   Sit to stand: minimum assist with prosthesis: yes    Amputee   Level of amputation: right  and transfemoral   Phantom pain: phantom pain                Precautions: Fall Risk            Date 2/23 2/24 3/3 3/8 3/10   Visit Count 6 7 8 9 10   FOTO        Pain In        Pain Out          Manuals                                        Neuro Re-Ed         SLS        Tandem Stance        Sidestepping Resume  // 2 5 x  W/ 1 brief seated rest  Turn negotiation in //   // 2 5 x  W/ 1 brief seated rest  Turn negotiation in // // 1 5 x  W/ 1 brief seated rest  Turn negotiation in //     Static standing Resume  5 min with dynamic reaching with cones   ADL  Cues WS/postural awareness    CS/CGA 5x to nataliya w/ UE support  Firm/EO  CS/CGA 5x to nataliya w/ UE support  Firm/EO  CS/CGA    Dynamic reach  ADL suite  Cones/letters  2 trials to nataliya  1 min 52 sec  2 min 5 sec 5x to nataliya w/ UE support  Firm/EO  CS/CGA    Dynamic reach  ADL suite  Varied levels  2 trials to nataliya  1 min 42 sec  2 min 7 sec                            Ther Ex        NuStep 11 min  L3  Cues pacing, UE/LE ext    CGA with transfer w/ RW  Cues hand placement L3  10 min  Stand/pivot transfer  CGA L3  10 min  Stand/pivot transfer  CGA L3  10 min  Stand/pivot transfer  CGA    Lying prone to promote hip extension        Core Strengthening         MTP/LTP [place] NA  Seated     LTP Black TB  3x10    MTP Black TB  3x10    Bicep 2# DB  3x10    Tricep  Black TB  3x10    Cues AH  Postural awareness resume      STS                        HEP        Ther Activity        Steps                 Gait Training        With RW 80' x 1    W/ RW   CGA w/ WC follow  Cues  WS, AD placement, step stride, postural awareness  104' x 1    W/ RW   CGA w/ WC follow  Cues  WS, AD placement, step stride, postural awareness  80' x1  92' x1    W/ RW   CGA w/ WC follow  Cues  WS, AD placement, step stride, postural awareness  80' x1  98' x1  100' x1    W/ RW   CGA w/ WC follow  Cues  WS, AD placement, step stride, postural awareness             Modalities

## 2022-03-15 ENCOUNTER — OFFICE VISIT (OUTPATIENT)
Dept: PHYSICAL THERAPY | Facility: CLINIC | Age: 62
End: 2022-03-15
Payer: COMMERCIAL

## 2022-03-15 DIAGNOSIS — Z89.611 HX OF AKA (ABOVE KNEE AMPUTATION), RIGHT (HCC): ICD-10-CM

## 2022-03-15 DIAGNOSIS — M21.372 FOOT DROP, LEFT: Primary | ICD-10-CM

## 2022-03-15 PROCEDURE — 97116 GAIT TRAINING THERAPY: CPT

## 2022-03-15 PROCEDURE — 97112 NEUROMUSCULAR REEDUCATION: CPT

## 2022-03-15 PROCEDURE — 97110 THERAPEUTIC EXERCISES: CPT

## 2022-03-15 NOTE — PROGRESS NOTES
Daily Note     Today's date: 3/15/2022  Patient name: Roderick Galdamez  : 1960  MRN: [id number]  Referring provider: Viky Cortes DO  Dx:   Encounter Diagnosis     ICD-10-CM    1  Foot drop, left  M21 372    2  Hx of AKA (above knee amputation), right ([address] )  Z89 611                   Subjective: I had adjustment made to socket of prosthetic  Fitting better  Walking more at home but harder walking on the carpet      Objective: See treatment diary below      Assessment: Tolerated treatment well  1* focus on gait quality this date   conts with fwd trunk posture with standing/gait   AmPro and 2 MWT added this date per discussion with PT/TT  Pt reports trying to stand/walk more at home  Pt reports wearing  during off times of use of prosthetic  Pt demonstrates min progression with ambulation endurance  Cont's to readjust RW from proper fitting to personal preference which contributes to > fwd trunk posture  Pt educated benefits with maintaining upright posture  Pt reports fear of falling at home with pt utilizing personal friend to assist with ambulation in home setting   Patient would benefit from continued PT   Review core / UE HEP upcoming      Plan: Continue per plan of care                 Precautions: Fall Risk  Re-Eval: 3/4/22      Date 2/23 2/24 3/3 3/8 3/15   Visit Count 6 7 8 9 10   FOTO        Pain In        Pain Out          Manuals                                        Neuro Re-Ed         AMPRO     15 min     SLS        Tandem Stance        Sidestepping Resume  // 2 5 x  W/ 1 brief seated rest  Turn negotiation in //   // 2 5 x  W/ 1 brief seated rest  Turn negotiation in // // 1 5 x  W/ 1 brief seated rest  Turn negotiation in //     Static standing Resume  5 min with dynamic reaching with cones   ADL  Cues WS/postural awareness    CS/CGA 5x to nataliya w/ UE support  Firm/EO  CS/CGA 5x to nataliya w/ UE support  Firm/EO  CS/CGA    Dynamic reach  ADL suite  Cones/letters  2 trials to nataliya  1 min 52 sec  2 min 5 sec 5x to nataliya w/ UE support  Firm/EO  CS/CGA    Dynamic reach  ADL suite  Varied levels  2 trials to nataliya  1 min 42 sec  2 min 7 sec 5x to nataliya w/  UE support  Firm/EO  CS/CGA    Dynamic reach  Seated/standing  R/L UE                             Ther Ex        NuStep 11 min  L3  Cues pacing, UE/LE ext    CGA with transfer w/ RW  Cues hand placement L3  10 min  Stand/pivot transfer  CGA L3  10 min  Stand/pivot transfer  CGA L3  10 min  Stand/pivot transfer  CGA L3  10 min  Stand/pivot transfer  CGA   Lying prone to promote hip extension        Core Strengthening         MTP/LTP Michael NA  Seated     LTP Black TB  3x10    MTP Black TB  3x10    Bicep 2# DB  3x10    Tricep  Black TB  3x10    Cues AH  Postural awareness resume      STS     10x  Cues WS  BL UE  CS/CGA                   HEP        Ther Activity        Steps                 Gait Training        With RW 80' x 1    W/ RW   CGA w/ WC follow  Cues  WS, AD placement, step stride, postural awareness  104' x 1    W/ RW   CGA w/ WC follow  Cues  WS, AD placement, step stride, postural awareness  80' x1  92' x1    W/ RW   CGA w/ WC follow  Cues  WS, AD placement, step stride, postural awareness  80' x1  98' x1  100' x1    W/ RW   CGA w/ WC follow  Cues  WS, AD placement, step stride, postural awareness  2 MWT = 22 meters  With RW  CGA    78' x1    W/ RW   CGA w/ WC follow  Cues  WS, AD placement, step stride, postural            Modalities

## 2022-03-16 NOTE — PROGRESS NOTES
Daily Note     Today's date: 3/17/2022  Patient name: Gary Slater  : 1960  MRN: [id number]  Referring provider: Homer Catalan DO  Dx:   Encounter Diagnosis     ICD-10-CM    1  Foot drop, left  M21 372    2  Hx of AKA (above knee amputation), right ([address] )  Z89 611                   Subjective: I am trying to stand/walk more at home  Live -in friend helps me a lot around the home  Prosthetic socket is fitting better no irritation R groin any more since modification       Objective: See treatment diary below      Assessment: Tolerated treatment well  Pt resumed UE strengthening/core exercises this date  1* focus turn negotiation in // bars  Trial using RW upcoming  Pt conts with fwd trunk posture standing/gait  Noted improve gait quality with initiating heel strike/TKE RLE   Fatigues easily with ambulation  Encourage endurance/walking at home   Patient would benefit from continued PT      Plan: Continue per plan of care                 Precautions: Fall Risk  Re-Eval: 3/4/22      Date 3/17       Visit Count 11       FOTO        Pain In        Pain Out          Manuals                                        Neuro Re-Ed         AMPRO        SLS        Tandem Stance        Turn negotiation //  2x to left/right ea dir  Max cues UE placement, WS    CS/Lizzy       Sidestepping Resume    2 5 x  W/ 1 brief seated rest  Turn negotiation in //       Static standing 5x to nataliya w/  UE support  Firm/EO  CS/CGA    Dynamic reach  tanding  R/L UE w/ cones                               Ther Ex        NuStep L3  10 min  Stand/pivot transfer  CGA       Lying prone to promote hip extension        Core Strengthening         MTP/LTP Michael  10#  2x10 ea  Seated  Cues AH       Bicep 10#  2x10  Seated  Cues AH       Tricep 10#  2x10  Seated  Cues AH       STS                        HEP        Ther Activity        Steps                 Gait Training        With RW 80' x 1  64'x1    W/ RW   CGA w/ WC follow  Cues  WS, AD placement, step stride, > DEACON  postural awareness                Modalities

## 2022-03-17 ENCOUNTER — OFFICE VISIT (OUTPATIENT)
Dept: PHYSICAL THERAPY | Facility: CLINIC | Age: 62
End: 2022-03-17
Payer: COMMERCIAL

## 2022-03-17 DIAGNOSIS — Z89.611 HX OF AKA (ABOVE KNEE AMPUTATION), RIGHT (HCC): ICD-10-CM

## 2022-03-17 DIAGNOSIS — M21.372 FOOT DROP, LEFT: Primary | ICD-10-CM

## 2022-03-17 PROCEDURE — 97116 GAIT TRAINING THERAPY: CPT

## 2022-03-17 PROCEDURE — 97112 NEUROMUSCULAR REEDUCATION: CPT

## 2022-03-17 PROCEDURE — 97110 THERAPEUTIC EXERCISES: CPT

## 2022-03-18 ENCOUNTER — OFFICE VISIT (OUTPATIENT)
Dept: PAIN MEDICINE | Facility: CLINIC | Age: 62
End: 2022-03-18
Payer: COMMERCIAL

## 2022-03-18 VITALS
BODY MASS INDEX: 27.37 KG/M2 | SYSTOLIC BLOOD PRESSURE: 107 MMHG | HEART RATE: 74 BPM | DIASTOLIC BLOOD PRESSURE: 70 MMHG | HEIGHT: 68 IN

## 2022-03-18 DIAGNOSIS — G89.4 CHRONIC PAIN SYNDROME: Primary | ICD-10-CM

## 2022-03-18 DIAGNOSIS — Z79.4 TYPE 2 DIABETES MELLITUS WITH DIABETIC POLYNEUROPATHY, WITH LONG-TERM CURRENT USE OF INSULIN (HCC): ICD-10-CM

## 2022-03-18 DIAGNOSIS — E11.42 TYPE 2 DIABETES MELLITUS WITH DIABETIC POLYNEUROPATHY, WITH LONG-TERM CURRENT USE OF INSULIN (HCC): ICD-10-CM

## 2022-03-18 DIAGNOSIS — S78.111A ABOVE-KNEE AMPUTATION OF RIGHT LOWER EXTREMITY (HCC): ICD-10-CM

## 2022-03-18 PROCEDURE — 3078F DIAST BP <80 MM HG: CPT | Performed by: NURSE PRACTITIONER

## 2022-03-18 PROCEDURE — 3074F SYST BP LT 130 MM HG: CPT | Performed by: NURSE PRACTITIONER

## 2022-03-18 PROCEDURE — 99214 OFFICE O/P EST MOD 30 MIN: CPT | Performed by: NURSE PRACTITIONER

## 2022-03-18 RX ORDER — PREGABALIN 25 MG/1
25 CAPSULE ORAL 3 TIMES DAILY
Qty: 90 CAPSULE | Refills: 1 | Status: SHIPPED | OUTPATIENT
Start: 2022-03-18 | End: 2022-05-31

## 2022-03-18 RX ORDER — DULOXETIN HYDROCHLORIDE 30 MG/1
30 CAPSULE, DELAYED RELEASE ORAL
Qty: 30 CAPSULE | Refills: 2 | Status: SHIPPED | OUTPATIENT
Start: 2022-03-18 | End: 2022-06-24 | Stop reason: SDUPTHER

## 2022-03-18 NOTE — PROGRESS NOTES
Assessment:  1  Chronic pain syndrome    2  Type 2 diabetes mellitus with diabetic polyneuropathy, with long-term current use of insulin ([facility])    3  Above-knee amputation of right lower extremity ([address] )        Plan:  While the patient was in the office today, I did have a thorough conversation regarding their chronic pain syndrome, medication management, and treatment plan options  Patient is being seen for follow-up visit  He was last seen here on 02/16/2022 which time Lyrica was decreased to 50 mg 3 times daily along with Cymbalta 60 mg at bedtime  Overall, he did well with the decreased dose of Lyrica  He is complaining of some increased phantom pain mainly at night  We will decrease the Lyrica to 25 mg 3 times daily  A new prescription was sent to his pharmacy  Increase Cymbalta to 90 mg at bedtime  Prescription for the 30 mg dosage was sent to his pharmacy  He still has enough 60 mg pills at home  The patient will follow-up in 1 month for medication prescription refill and reevaluation  The patient was advised to contact the office should their symptoms worsen in the interim  The patient was agreeable and verbalized an understanding  History of Present Illness: The patient is a 57 y o  male who presents for a follow up office visit in regards to Arm Pain and Knee Pain  The patients current symptoms include complaints of right stump pain, right phantom lower extremity pain  Current pain level is a 7/10  Quality pain is described as dull, aching, sharp, throbbing, cramping, shooting  Current pain medications includes:  Lyrica 50 mg 3 times daily, Cymbalta 60 mg at bedtime    The patient reports that this regimen is providing up to 70 % pain relief  The patient is reporting no side effects from this pain medication regimen      I have personally reviewed and/or updated the patient's past medical history, past surgical history, family history, social history, current medications, allergies, and vital signs today  Review of Systems  Review of Systems   Respiratory: Negative for shortness of breath  Cardiovascular: Negative for chest pain  Gastrointestinal: Positive for nausea and vomiting  Negative for constipation and diarrhea  Musculoskeletal: Positive for gait problem  Negative for arthralgias, joint swelling and myalgias  Pain in extremity - AKA Phantom Pain & Cramping   Neurological: Negative for dizziness, seizures and weakness  Memory loss   All other systems reviewed and are negative  Past Medical History:   Diagnosis Date    Arthritis     Diabetes mellitus ([facility] )     GERD (gastroesophageal reflux disease)     Hypercholesteremia     Hypertension     Ischemia of right lower extremity with suspected rhabdomyolysis 2/6/2021    Left Hydropneumothorax 2/10/2021    Methadone use     Pneumonia due to COVID-19 virus 1/11/2021    Subacute osteomyelitis of right femur ([facility] ) 9/13/2021       Past Surgical History:   Procedure Laterality Date    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    Procedure: AMPUTATION ABOVE KNEE (AKA);   Surgeon: Karly Mckenzie MD;  [facility];  Service: Vascular    AMPUTATION ABOVE KNEE (AKA) Right 1/18/2021    Procedure: AMPUTATION ABOVE KNEE (AKA) FORMALIZATION,  R AKA wound washout, wound closure;  Surgeon: Karly Mckenzie MD;  [facility];  Service: Vascular    ARTERIOGRAM Right 1/13/2021    Procedure: ARTERIOGRAM;  Surgeon: Mayur Barbour DO;  [facility];  Service: Vascular    IR CHEST TUBE PLACEMENT  2/10/2021    IR LOWER EXTREMITY ANGIOGRAM  1/14/2021    THROMBECTOMY W/ EMBOLECTOMY Right 1/13/2021    Procedure: EMBOLECTOMY/THROMBECTOMY LOWER EXTREMITY;  Surgeon: Mayur Barbour DO;  [facility];  Service: Vascular       Family History   Problem Relation Age of Onset    Diabetes Mother     Hypertension Father     Leukemia Father     Acute lymphoblastic leukemia Father     Cancer Sister        Social History     Occupational History    Not on file   Tobacco Use    Smoking status: Former Smoker    Smokeless tobacco: Never Used   Vaping Use    Vaping Use: Never used   Substance and Sexual Activity    Alcohol use: Not Currently    Drug use: No     Comment: methadone    Sexual activity: Not on file         Current Outpatient Medications:     acetaminophen (TYLENOL) 325 mg tablet, Take 3 tablets (975 mg total) by mouth every 8 (eight) hours (Patient taking differently: Take 650 mg by mouth 3 (three) times a day as needed for moderate pain ), Disp: , Rfl: 0    albuterol (PROVENTIL HFA,VENTOLIN HFA) 90 mcg/act inhaler, Inhale 2 puffs every 4 (four) hours as needed for wheezing, Disp: , Rfl: 0    atorvastatin (LIPITOR) 20 mg tablet, Take 1 tablet (20 mg total) by mouth daily, Disp: 90 tablet, Rfl: 3    BD Veo Insulin Syringe U/F 31G X 15/64" 0 5 ML MISC, USE AS DIRECTED, Disp: 100 each, Rfl: 0    Blood Glucose Monitoring Suppl (OneTouch Verio) w/Device KIT, Use to test blood sugars 3 times daily, Disp: 1 kit, Rfl: 0    cholecalciferol (VITAMIN D3) 1,000 units tablet, Take 2 tablets (2,000 Units total) by mouth daily, Disp: 12 tablet, Rfl: 0    Continuous Blood Gluc  (FreeStyle Ke 2 [place]) JOSEPH, Use as directed, Disp: 1 each, Rfl: 0    Continuous Blood Gluc Sensor (FreeStyle Ke 2 Sensor) MISC, Change every 14 days, Disp: 6 each, Rfl: 1    Diclofenac Sodium (VOLTAREN) 1 %, Apply 4 g topically 4 (four) times a day (Patient taking differently: Apply 4 g topically 4 (four) times a day as needed ), Disp: , Rfl: 0    DULoxetine (CYMBALTA) 60 mg delayed release capsule, Take 1 capsule (60 mg total) by mouth daily at bedtime, Disp: 30 capsule, Rfl: 2    Eliquis 5 MG, Take 1 tablet by mouth twice daily, Disp: 180 tablet, Rfl: 2    Empagliflozin (Jardiance) 25 MG TABS, Take 1 tablet (25 mg total) by mouth every morning, Disp: 90 tablet, Rfl: 3   fluticasone (FLONASE) 50 mcg/act nasal spray, 1 spray into each nostril daily, Disp: 16 g, Rfl: 2    glucose blood test strip, Use 1 each 3 (three) times daily after meals Use as instructed, Disp: 270 each, Rfl: 3    insulin glargine (LANTUS) 100 units/mL subcutaneous injection, Inject 25 Units under the skin daily at bedtime, Disp: , Rfl:     insulin lispro (HumaLOG KwikPen) 100 units/mL injection pen, Blood Glucose 150 - 224: 3 Unit of Insulin Blood Glucose 225 - 299: 4 Units of Insulin Blood Glucose 300 - 374: 5 Units of Insulin Blood Glucose 375 - 449: 6 Units of Insulin Blood Glucose greater than or equal to 450: 7 Units of Insulin, Disp: 15 mL, Rfl: 3    Insulin Pen Needle 31G X 5 MM MISC, Use daily Pt to inject 4 X daily, Disp: 100 each, Rfl: 5    Insulin Pen Needle 31G X 5 MM MISC, 30 units sq 2X daily, Disp: 100 each, Rfl: 3    lansoprazole (PREVACID) 30 mg capsule, Take 1 capsule (30 mg total) by mouth daily, Disp: 30 capsule, Rfl: 0    levocetirizine (XYZAL) 5 MG tablet, TAKE 1 TABLET BY MOUTH ONCE DAILY IN THE EVENING, Disp: 30 tablet, Rfl: 0    lidocaine (LMX) 4 % cream, Apply topically 4 (four) times a day as needed for mild pain, Disp: 30 g, Rfl: 0    lisinopril (ZESTRIL) 10 mg tablet, Take 1 tablet (10 mg total) by mouth daily, Disp: 30 tablet, Rfl: 0    magnesium oxide (MAG-OX) 400 mg, Take 1 tablet (400 mg total) by mouth 2 (two) times a day, Disp: 60 tablet, Rfl: 0    menthol-methyl salicylate (BENGAY) 50-36 % cream, Apply topically 4 (four) times a day as needed (torticollis), Disp: , Rfl: 0    methadone (DOLOPHINE) 10 mg tablet, Take 70 mg by mouth daily,, Disp: , Rfl:     multivitamin-minerals (CENTRUM ADULTS) tablet, Take 1 tablet by mouth daily, Disp: , Rfl: 0    naloxone (NARCAN) 4 mg/0 1 mL nasal spray, Administer 1 spray into a nostril   If no response after 2-3 minutes, give another dose in the other nostril using a new spray , Disp: 1 each, Rfl: 1    pregabalin (LYRICA) 25 mg capsule, Take 1 capsule (25 mg total) by mouth 3 (three) times a day, Disp: 90 capsule, Rfl: 1    silver sulfadiazine (SILVADENE,SSD) 1 % cream, Apply topically daily Apply as directed to left heel ulcer daily with each dressing change , Disp: 25 g, Rfl: 2    sitaGLIPtin-metFORMIN (JANUMET)  MG per tablet, Take 1 tablet by mouth 2 (two) times a day with meals, Disp: 60 tablet, Rfl: 0    DULoxetine (CYMBALTA) 30 mg delayed release capsule, Take 1 capsule (30 mg total) by mouth daily at bedtime, Disp: 30 capsule, Rfl: 2    Allergies   Allergen Reactions    Varenicline        Physical Exam:    /70 (BP Location: Right arm, Patient Position: Sitting, Cuff Size: Standard)   Pulse 74   Ht 5' 8" (1 727 m)   BMI 27 37 kg/m²     Constitutional:normal, well developed, well nourished, alert, in no distress and non-toxic and no overt pain behavior  Eyes:anicteric  HEENT:grossly intact  Neck:supple, symmetric, trachea midline and no masses   Pulmonary:even and unlabored  Cardiovascular:No edema or pitting edema present  Skin:Normal without rashes or lesions and well hydrated  Psychiatric:Mood and affect appropriate  Neurologic:Cranial Nerves II-XII grossly intact  Musculoskeletal:in wheelchair +right AKA  Imaging  MRI LOWER EXTREMITY WITHOUT CONTRAST - right femur     INDICATION:   Rule out osteomyelitis  Status post right AKA  Radiographs concerning for osteomyelitis  Presented with right stump pain  No reported open wound or trauma  History of diabetes      COMPARISON:  9/2/2021 radiographs     TECHNIQUE:  MR examination of the right femur was performed  Pulse Sequences: Coronal T1 nonfat sat and STIR, axial T1 and T2 fat sat, and sagittal T2 fat sat  (Please note the coronal images also include the contralateral lower extremity ) IV contrast   was not given          FINDINGS:     SUBCUTANEOUS TISSUES: Edema of the distal stump, most severe laterally, with free fluid, no fluid collection    No sinus tract or definite ulcer      BONES:    Loss of normal fatty marrow signal on T1-weighted sequences distal 2 0 cm of the stump with accompanying low signal intensity centrally on fluid sensitive sequences, suggesting chronic infection and necrosis  Peripheral high signal, most compatible with   granulation tissue  Surrounding periosteal edema      No fracture or malalignment      No significant hip joint effusions  Femoral heads have normal signal and morphology  Pubic symphysis is within normal limits      ARTICULAR SURFACES:  Normal      VISUALIZED MUSCULATURE:    Multifocal muscle fatty infiltration, atrophy of the posterior compartment, hamstrings  Superimposed mild hamstring edema and more significant edema of the rectus femoris and vastus lateralis and intermedius muscles  No fluid collections or masses      Hamstring and adductor origins and iliopsoas and gluteus insertions appear intact      OTHER SOFT TISSUES:  No mass lesions to suggest stump neuroma  Visualized bladder and bowel are within normal limits  No lymphadenopathy      OTHER PERTINENT FINDINGS:  Testes are within normal limits      PARTIALLY IMAGED CONTRALATERAL LOWER EXTREMITY: There are no abnormalities in the partially imaged contralateral lower extremity      The study was marked in EPIC for immediate notification      IMPRESSION:     Findings most compatible with chronic osteomyelitis of the distal stump      No abscess      Muscle edema may be myositis or could be acute superimposed on chronic atrophy

## 2022-03-21 NOTE — PROGRESS NOTES
Daily Note     Today's date: 3/22/2022  Patient name: Nydia Estes  : 1960  MRN: [id number]  Referring provider: Sydney Maxwell DO  Dx:   Encounter Diagnosis     ICD-10-CM    1  Foot drop, left  M21 372    2  Hx of AKA (above knee amputation), right ([address] )  Z89 611                   Subjective: See RA for additional findings  I dont walk every day but work on walking more over weekend  Trying to stand with UE support 5 min        Objective: See treatment diary below      Assessment: Tolerated treatment well  Noted improvement with walking endurance  conts with fwd trunk flex  With standing/walking   Noted improvement > DEACON with ambulation  Max cues for > WB R LE   Improved independence with chair <>chair transfers  Pt voids WB RLE with sit <>stand transfers with max cues  Pt reports fear of falling  Pt self limited with progression of gait endurance, max encouragement to progress >distance   Patient would benefit from continued PT      Plan: Continue per plan of care                 Precautions: Fall Risk  Re-Eval: 3/4/22      Date 3/17 3/22      Visit Count 11 12      FOTO        Pain In        Pain Out          Manuals                                        Neuro Re-Ed         AMPRO  updated      SLS        Tandem Stance        Turn negotiation //  2x to left/right ea dir  Max cues UE placement, WS    CS/Lizzy       Sidestepping Resume    2 5 x  W/ 1 brief seated rest  Turn negotiation in //       Static standing 5x to nataliya w/  UE support  Firm/EO  CS/CGA    Dynamic reach  tanding  R/L UE w/ cones 2x to nataliya w/o UE support  25"  32"  CS/CGA                              Ther Ex        NuStep L3  10 min  Stand/pivot transfer  CGA L3  10 min  Stand/pivot transfer  CGA      Lying prone to promote hip extension        Core Strengthening         MTP/LTP [place]  10#  2x10 ea  Seated  Cues AH       Bicep 10#  2x10  Seated  Cues AH       Tricep 10#  2x10  Seated  Cues AH       STS                        HEP Ther Activity        Steps   Ascending fwd  Descending retro  1x BL UE  1 step @ time  CG/Lizzy  Max cues > focus WB RLE/GS              Gait Training        With RW 80' x 1  64'x1    W/ RW   CGA w/ WC follow  Cues  WS, AD placement, step stride, > DEACON  postural awareness  124' x 1  72'x1    W/ RW   CGA w/ WC follow  Cues  WS, AD placement, step stride, > DEACON  postural awareness               Modalities

## 2022-03-22 ENCOUNTER — EVALUATION (OUTPATIENT)
Dept: PHYSICAL THERAPY | Facility: CLINIC | Age: 62
End: 2022-03-22
Payer: COMMERCIAL

## 2022-03-22 DIAGNOSIS — Z89.611 HX OF AKA (ABOVE KNEE AMPUTATION), RIGHT (HCC): ICD-10-CM

## 2022-03-22 DIAGNOSIS — M21.372 FOOT DROP, LEFT: Primary | ICD-10-CM

## 2022-03-22 PROCEDURE — 97112 NEUROMUSCULAR REEDUCATION: CPT

## 2022-03-22 PROCEDURE — 97116 GAIT TRAINING THERAPY: CPT

## 2022-03-22 PROCEDURE — 97110 THERAPEUTIC EXERCISES: CPT

## 2022-03-24 ENCOUNTER — OFFICE VISIT (OUTPATIENT)
Dept: PHYSICAL THERAPY | Facility: CLINIC | Age: 62
End: 2022-03-24
Payer: COMMERCIAL

## 2022-03-24 DIAGNOSIS — M21.372 FOOT DROP, LEFT: Primary | ICD-10-CM

## 2022-03-24 DIAGNOSIS — Z89.611 HX OF AKA (ABOVE KNEE AMPUTATION), RIGHT (HCC): ICD-10-CM

## 2022-03-24 PROCEDURE — 97112 NEUROMUSCULAR REEDUCATION: CPT

## 2022-03-24 PROCEDURE — 97116 GAIT TRAINING THERAPY: CPT

## 2022-03-24 PROCEDURE — 97110 THERAPEUTIC EXERCISES: CPT

## 2022-03-24 NOTE — PROGRESS NOTES
Daily Note     Today's date: 3/24/2022  Patient name: Enrrique Sal  : 1960  MRN: [id number]  Referring provider: Li Fernandez DO  Dx:   Encounter Diagnosis     ICD-10-CM    1  Foot drop, left  M21 372    2  Hx of AKA (above knee amputation), right ([facility])  Z89 611                   Subjective: I am walking most days but not every day  I am getting more confident with my transfers on my own        Objective: See treatment diary below      Assessment: Tolerated treatment well  Patient demonstrated fatigue post treatment   Pt self limiting with progression of endurance with ambulation, provided encouragement to progress for further endurance  Pt lacks confidence static standing without UE support  Encourage carryover static standing with dynamic reach, unsupported at home  Pt able to reach to floor to  item with 1 UE support    Plan: Continue per plan of care                 Precautions: Fall Risk  Re-Eval: 3/4/22      Date 3/17 3/22 3/24     Visit Count 11 12 13     FOTO        Pain In        Pain Out          Manuals                                        Neuro Re-Ed         AMPRO  updated      SLS        Tandem Stance        Turn negotiation //  2x to left/right ea dir  Max cues UE placement, WS    CS/Lizzy  //  2x to left/right ea dir  Max cues UE placement, WS    CS     Sidestepping Resume    2 5 x  W/ 1 brief seated rest  Turn negotiation in //       Static standing 5x to nataliya w/  UE support  Firm/EO  CS/CGA    Dynamic reach  tanding  R/L UE w/ cones 2x to nataliya w/o UE support  25"  32"  CS/CGA 2x to nataliya w/o UE support  20"  29"  CS/CGA    Dynamic reach   / toss bean bag  10x R/L UE  CGA                             Ther Ex        NuStep L3  10 min  Stand/pivot transfer  CGA L3  10 min  Stand/pivot transfer  CGA L3  10 min  Stand/pivot transfer  CGA     Lying prone to promote hip extension   6 min prone    Bed mobility     Core Strengthening         MTP/LTP Michael  10#  2x10 ea  Seated  Cues AH Bicep 10#  2x10  Seated  Cues AH       Tricep 10#  2x10  Seated  Cues AH       STS                        HEP        Ther Activity        Steps   Ascending fwd  Descending retro  1x BL UE  1 step @ time  CG/Lizzy  Max cues > focus WB RLE/GS 6"  //  5x  CGA  Min cues             Gait Training        With RW 80' x 1  64'x1    W/ RW   CGA w/ WC follow  Cues  WS, AD placement, step stride, > DEACON  postural awareness  124' x 1  72'x1    W/ RW   CGA w/ WC follow  Cues  WS, AD placement, step stride, > DEACON  postural awareness  113' x 1  69'x1    W/ RW   CGA w/ WC follow  Cues  WS, AD placement, step stride, > DEACON  postural awareness             Modalities

## 2022-03-28 NOTE — PROGRESS NOTES
Daily Note     Today's date: 3/29/2022  Patient name: Michael Grover  : 1960  MRN: [id number]  Referring provider: Katie Fountain DO  Dx:   Encounter Diagnosis     ICD-10-CM    1  Foot drop, left  M21 372    2  Hx of AKA (above knee amputation), right ([address] )  Z89 611                   Subjective: Just came from eye dr, eyes are dilated  Standing 5 min 3x/day  Trying reaching when standing  Walking 1x/day w/ family/friend, using RW  R leg hurting, may be from weaning off lyrica   Cont with phantom pain day/night          Objective: See treatment diary below      Assessment: Tolerated treatment fairly well  Pt reported increase discomfort R distal femur  No visible skin irritation end rx session  Pt encourage cont with   Pt conts to avoid WB > R LE with standing exercises  Noted improvement with step stride, > consistency with heel strike RLE  Patient demonstrated fatigue post treatment and would benefit from continued PT      Plan: Continue per plan of care                 Precautions: Fall Risk  Re-Eval: 3/4/22      Date 3/17 3/22 3/24 3/29    Visit Count 11 12 13 14    FOTO        Pain In        Pain Out          Manuals                                        Neuro Re-Ed         AMPRO  updated      SLS        Tandem Stance        Turn negotiation //  2x to left/right ea dir  Max cues UE placement, WS    CS/Lizzy  //  2x to left/right ea dir  Max cues UE placement, WS    CS //  2x to left/right ea dir  Max cues UE placement, WS    CS      Sidestepping Resume    2 5 x  W/ 1 brief seated rest  Turn negotiation in //       Static standing 5x to nataliya w/  UE support  Firm/EO  CS/CGA    Dynamic reach  tanding  R/L UE w/ cones 2x to nataliya w/o UE support  25"  32"  CS/CGA 2x to nataliya w/o UE support  20"  29"  CS/CGA    Dynamic reach   / toss bean bag  10x R/L UE  CGA 5x to pt nataliya  W/dynamic reach/ word spell/point R/L UE Cues WB RLE  Postural awareness                              Ther Ex        NuStep L3  10 min  Stand/pivot transfer  CGA L3  10 min  Stand/pivot transfer  CGA L3  10 min  Stand/pivot transfer  CGA L3  10 min  Stand/pivot transfer  CGA    Lying prone to promote hip extension   6 min prone    Bed mobility     Core Strengthening         MTP/LTP Michael  10#  2x10 ea  Seated  Cues AH       Bicep 10#  2x10  Seated  Cues AH       Tricep 10#  2x10  Seated  Cues AH       STS                        HEP        Ther Activity        Steps   Ascending fwd  Descending retro  1x BL UE  1 step @ time  CG/Lizzy  Max cues > focus WB RLE/GS 6"  //  5x  CGA  Min cues             Gait Training        With RW 80' x 1  64'x1    W/ RW   CGA w/ WC follow  Cues  WS, AD placement, step stride, > DEACON  postural awareness  124' x 1  72'x1    W/ RW   CGA w/ WC follow  Cues  WS, AD placement, step stride, > DEACON  postural awareness  113' x 1  69'x1    W/ RW   CGA w/ WC follow  Cues  WS, AD placement, step stride, > DEACON  postural awareness 70'x1  60' x1  62' x 1    W/ RW   CGA w/ WC follow  Cues  WS, AD placement, step stride, > DEACON  postural awareness            Modalities

## 2022-03-29 ENCOUNTER — OFFICE VISIT (OUTPATIENT)
Dept: PHYSICAL THERAPY | Facility: CLINIC | Age: 62
End: 2022-03-29
Payer: COMMERCIAL

## 2022-03-29 DIAGNOSIS — M21.372 FOOT DROP, LEFT: Primary | ICD-10-CM

## 2022-03-29 DIAGNOSIS — Z89.611 HX OF AKA (ABOVE KNEE AMPUTATION), RIGHT (HCC): ICD-10-CM

## 2022-03-29 PROCEDURE — 97116 GAIT TRAINING THERAPY: CPT

## 2022-03-29 PROCEDURE — 97110 THERAPEUTIC EXERCISES: CPT

## 2022-03-29 PROCEDURE — 97112 NEUROMUSCULAR REEDUCATION: CPT

## 2022-03-29 NOTE — PROGRESS NOTES
PT Re-Evaluation     Today's date: 3/22/22  Patient name: Donald Randhawa  : 1960  MRN: [id number]  Referring provider: Megha Carrasco DO  Dx:   Encounter Diagnosis     ICD-10-CM    1  Foot drop, left  M21 372    2  Hx of AKA (above knee amputation), right ([address] )  D82 [id number]        Start Time: 1630  Stop Time: 1057  Total time in clinic (min): 45 minutes    Assessment  Assessment details: The patient is a 58 y/o male who presents to PT with diagnosis of R AKA and L foot drop  He has complaints of phantom pain and leg cramps that have not changed since [facility]  He has overall demonstrated slow, gradual progress since [facility]  He is most limited by lack of consistency with HEP  He has demonstrated overall improvements in LE strength and functional endurance  Improving stability and independence with gait and transfers vs SOC  He demonstrates cont'd deficits with decreased ROM and strength, decreased flexibility, antalgic gait with use of prosthetic and RW, decreased balance and proprioception, difficulty with stair negotiation, limited standing tolerance and pain and difficulty with completing his ADLs  He lives with his friend who has been assisting him with ADLs and IADLs  He is able to assist more with transfers, bathing and dressing  He is transferring sit to stand with close S and cues now  Assist needed to don/doff prosthetic; requiring less cues/assistance vs SOC  He ambulates with use of prosthetic and RW with CGA/min A x 1 and w/c follow secondary to fatigue; distance is increasing week by week vs SOC  He ambulates with slow emili, decreased step and stride length and verbal cues for weight shifting  He has a MAFO for L foot 2* cont'd weakness  He has not been going upstairs at home, has been staying on first floor  He has been able to initiate negotiating stairs in the clinic with prothesis and assistance  He is not driving at this time    Secondary to surgery and above deficits he is limited with his overall mobility and function  The patient would benefit from continued PT to address deficits and improve function  Tx to include ROM, stretching, strengthening, modalities, HEP, pt education, postural ed, lifting/body mechanics, neuro re-ed, balance/proprioception Te, MT and equipment  Impairments: abnormal gait, abnormal or restricted ROM, activity intolerance, impaired balance, impaired physical strength, lacks appropriate home exercise program, pain with function, safety issue and weight-bearing intolerance  Other impairment: decreased endurance, decreased standing tolerance  Functional limitations: Assist with dressing, assist with transfers Understanding of Dx/Px/POC: good   Prognosis: good    Goals  STGs:  1  Initiate and complete HEP with verbal cues  - net   2  Improve BLE strength by 1/2 grade in 4 weeks  - not met   3  Will assess balance in upcoming visits as set goals  - progressing; updated goal-Pt to be able to stand unsupported > 30" with good safety/stability in order to improve his independence with ADLs by next RE  4   Patient will complete transfers with CGA x 1   -met   LTGs:  1  Patient to be I with HEP in 12 weeks  - not met   2  Improve BLE strength to > 4 to 4+/5 t/o in 12 weeks to improve function  - not met   3  Improve BLE ROM to WNL t/o in 12 weeks to improve function  - not met   4  Ambulation is improved to maximal level of function in 12 weeks  - not met   5  Stair negotiation is improved to maximal level of function in 12 weeks  - not met   6  Recreational performance in related activities is improved to maximal level of function in 12 weeks  - not met   7  ADL performance is improved to maximal level of function in 12 weeks  - progressing   8  Patient will complete transfers mod I   - -met         Plan  Plan details: Modalities and therapy interventions prn      Patient would benefit from: skilled physical therapy  Planned modality interventions: cryotherapy and thermotherapy: hydrocollator packs  Planned therapy interventions: manual therapy, balance, balance/weight bearing training, neuromuscular re-education, patient education, postural training, self care, strengthening, stretching, therapeutic activities, therapeutic exercise, flexibility, home exercise program, prosthetic fitting/training, gait training and transfer training  Frequency: 2x week  Duration in visits: 12  Duration in weeks: 6  Plan of Care beginning date: 3/22/2022  Plan of Care expiration date: 5/3/2022  Treatment plan discussed with: patient        Subjective Evaluation    History of Present Illness  Mechanism of injury: Pt with no new sx/complaints  Notes he feels he is progressing, however not as much or as fast as he would like  Notes compliance with HEP  Continues with skin checks  Denies any skin breakdown  Notes he is still struggling with mobility and walking with current prothesis  No change in phantom pains reported  Pain  At best pain rating: 3  At worst pain rating: 10  Location: R phantom pains, leg cramps   Quality: dull ache, sharp and squeezing    Social Support  Steps to enter house: yes (Ramps to enter )  4  Stairs in house: yes (Bathroom on second floor)   Lives in: multiple-level home (Able to sleep on first floor, has [facility])  Lives with: Nephew      Employment status: not working  Treatments  Previous treatment: physical therapy and home therapy  Discharged from (in last 30 days): home health care  Patient Goals  Patient goals for therapy: increased motion, decreased pain, increased strength, improved balance, independence with ADLs/IADLs and return to sport/leisure activities  Patient goal: "To be able to walk with the walker, to be able to go up the steps "        Objective     Active Range of Motion   Left Hip   Flexion: WFL  Abduction: WFL    Right Hip   Flexion: WFL  Abduction: WFL  Left Knee   Flexion: WFL  Extension: [facility]    Additional Active Range of Motion Details  Ankle ROM limited 50% L ankle  Most difficulty noted with df       Strength/Myotome Testing     Left Hip   Planes of Motion   Flexion: 4+  Abduction: 4  Adduction: 4+    Right Hip   Planes of Motion   Flexion: 4  Abduction: 4-  Adduction: 4    Left Knee   Flexion: 4+  Extension: 4+    Left Ankle/Foot   Dorsiflexion: 2-  Plantar flexion: 2-    Ambulation   Weight-Bearing Status   Assistive device used: two-wheeled walker    Additional Weight-Bearing Status Details  DME: BSC, RW, hospital bed   Had been using RW around the house with assist of his frie    Ambulation: Level Surfaces   Ambulation with assistive device: contact guard assist    Ambulation: Stairs     Additional Stairs Ambulation Details  Patient has been able to negotiate steps in clinic with prothesis and min A x 2 with use of B hand rails; step to pattern     Observational Gait   Decreased walking speed, stride length, left step length and right step length     Base of support: decreased    Additional Observational Gait Details  Step to gait pattern, verbal cues for weight shift to advance leg   - improved emili  Cont'd difficulty with WS to R LE with stance and with initiating heel strike needing  Cont to require cues to WS to advance prothesis   Neuro Exam:     Wheelchair mobility   WC mobility: yesPropelling: independent Turning: minimum assist     Transfers   Sit to stand: with prosthesis: yesSit to stand: Close S, 1 UE support ; forward trunk posture with standing     Amputee   Level of amputation: right  and transfemoral   Phantom pain: phantom pain

## 2022-03-30 NOTE — PROGRESS NOTES
Daily Note     Today's date: 3/31/2022  Patient name: Delia Hawk  : 1960  MRN: [id number]  Referring provider: Caleb Kirkpatrick DO  Dx:   Encounter Diagnosis     ICD-10-CM    1  Foot drop, left  M21 372    2  Hx of AKA (above knee amputation), right ([address] )  Z89 611                   Subjective: R leg is sore today  Walking now 2x day 1 am and again in pm  Didn't get any sleep last night R leg feeling phantom pain/restless leg  Still weaning off lyrica        Objective: See treatment diary below      Assessment: Tolerated treatment fairly well  Pt denied any increase pain R residual limb   Min redness post rx session  Improvement with prolong standing nataliya/dynamic reach  Pt reports feeling RLE feeling shorter end rx session an unable to stand unsupported > 20"  Patient demonstrated fatigue post treatment and would benefit from continued PT      Plan: Continue per plan of care                 Precautions: Fall Risk  Re-Eval: 22      Date 3/17 3/22 3/24 3/29 3/31   Visit Count 11 12 13 14 15   FOTO        Pain In        Pain Out          Manuals                                        Neuro Re-Ed         AMPRO  updated      SLS        Tandem Stance        Turn negotiation //  2x to left/right ea dir  Max cues UE placement, WS    CS/Lizzy  //  2x to left/right ea dir  Max cues UE placement, WS    CS //  2x to left/right ea dir  Max cues UE placement, WS    CS   resume   Sidestepping Resume    2 5 x  W/ 1 brief seated rest  Turn negotiation in //       Static standing 5x to nataliya w/  UE support  Firm/EO  CS/CGA    Dynamic reach  tanding  R/L UE w/ cones 2x to nataliya w/o UE support  25"  32"  CS/CGA 2x to nataliya w/o UE support  20"  29"  CS/CGA    Dynamic reach   / toss bean bag  10x R/L UE  CGA 5x to pt nataliya  W/dynamic reach/ word spell/point R/L UE Cues WB RLE  Postural awareness   1x 2 min/25 sec  Stand/dynamice reach  CG/Lizzy  Cues WB R LE/postural awareness     1x to pt nataliya 20"  Unsupported Ther Ex        NuStep L3  10 min  Stand/pivot transfer  CGA L3  10 min  Stand/pivot transfer  CGA L3  10 min  Stand/pivot transfer  CGA L3  10 min  Stand/pivot transfer  CGA L3  10 min  Stand/pivot transfer  CGA   Lying prone to promote hip extension   6 min prone    Bed mobility     Core Strengthening         MTP/LTP Michael  10#  2x10 ea  Seated  Cues AH    [place]  10#  2x10  Seated/disc    palloff press  Green Tb  10x 5" ea dir  Seated disc   Bicep 10#  2x10  Seated  Cues AH       Tricep 10#  2x10  Seated  Cues AH       STS                        HEP        Ther Activity        Steps   Ascending fwd  Descending retro  1x BL UE  1 step @ time  CG/Lizzy  Max cues > focus WB RLE/GS 6"  //  5x  CGA  Min cues             Gait Training        With RW 80' x 1  64'x1    W/ RW   CGA w/ WC follow  Cues  WS, AD placement, step stride, > DEACON  postural awareness  124' x 1  72'x1    W/ RW   CGA w/ WC follow  Cues  WS, AD placement, step stride, > DEACON  postural awareness  113' x 1  69'x1    W/ RW   CGA w/ WC follow  Cues  WS, AD placement, step stride, > DEACON  postural awareness 70'x1  60' x1  62' x 1    W/ RW   CGA w/ WC follow  Cues  WS, AD placement, step stride, > DEACON  postural awareness 92'x1  64' x1  2 trials to pt fatigue    W/ RW   CGA w/ WC follow  Cues  WS, AD placement, step stride, > DEACON  postural awareness           Modalities

## 2022-03-31 ENCOUNTER — OFFICE VISIT (OUTPATIENT)
Dept: PHYSICAL THERAPY | Facility: CLINIC | Age: 62
End: 2022-03-31
Payer: COMMERCIAL

## 2022-03-31 DIAGNOSIS — E11.42 TYPE 2 DIABETES MELLITUS WITH DIABETIC POLYNEUROPATHY, WITH LONG-TERM CURRENT USE OF INSULIN (HCC): ICD-10-CM

## 2022-03-31 DIAGNOSIS — Z79.4 TYPE 2 DIABETES MELLITUS WITH HYPERGLYCEMIA, WITH LONG-TERM CURRENT USE OF INSULIN (HCC): Chronic | ICD-10-CM

## 2022-03-31 DIAGNOSIS — Z79.4 TYPE 2 DIABETES MELLITUS WITH DIABETIC POLYNEUROPATHY, WITH LONG-TERM CURRENT USE OF INSULIN (HCC): ICD-10-CM

## 2022-03-31 DIAGNOSIS — M21.372 FOOT DROP, LEFT: Primary | ICD-10-CM

## 2022-03-31 DIAGNOSIS — E11.65 TYPE 2 DIABETES MELLITUS WITH HYPERGLYCEMIA, WITH LONG-TERM CURRENT USE OF INSULIN (HCC): Chronic | ICD-10-CM

## 2022-03-31 DIAGNOSIS — Z89.611 HX OF AKA (ABOVE KNEE AMPUTATION), RIGHT (HCC): ICD-10-CM

## 2022-03-31 PROCEDURE — 97110 THERAPEUTIC EXERCISES: CPT

## 2022-03-31 PROCEDURE — 97112 NEUROMUSCULAR REEDUCATION: CPT

## 2022-03-31 PROCEDURE — 97116 GAIT TRAINING THERAPY: CPT

## 2022-03-31 RX ORDER — INSULIN GLARGINE 100 [IU]/ML
25 INJECTION, SOLUTION SUBCUTANEOUS
Qty: 10 ML | Refills: 3 | Status: SHIPPED | OUTPATIENT
Start: 2022-03-31 | End: 2022-05-27 | Stop reason: SDUPTHER

## 2022-03-31 RX ORDER — INSULIN LISPRO 100 [IU]/ML
INJECTION, SOLUTION INTRAVENOUS; SUBCUTANEOUS
Qty: 15 ML | Refills: 3 | Status: SHIPPED | OUTPATIENT
Start: 2022-03-31 | End: 2022-05-17 | Stop reason: SDUPTHER

## 2022-04-05 ENCOUNTER — OFFICE VISIT (OUTPATIENT)
Dept: PHYSICAL THERAPY | Facility: CLINIC | Age: 62
End: 2022-04-05
Payer: COMMERCIAL

## 2022-04-05 DIAGNOSIS — Z89.611 HX OF AKA (ABOVE KNEE AMPUTATION), RIGHT (HCC): ICD-10-CM

## 2022-04-05 DIAGNOSIS — M21.372 FOOT DROP, LEFT: Primary | ICD-10-CM

## 2022-04-05 PROCEDURE — 97116 GAIT TRAINING THERAPY: CPT | Performed by: PHYSICAL THERAPIST

## 2022-04-05 PROCEDURE — 97110 THERAPEUTIC EXERCISES: CPT | Performed by: PHYSICAL THERAPIST

## 2022-04-05 NOTE — PROGRESS NOTES
Daily Note     Today's date: 2022  Patient name: Burt Irwin  : 1960  MRN: [id number]  Referring provider: Miles Christie DO  Dx:   Encounter Diagnosis     ICD-10-CM    1  Foot drop, left  M21 372    2  Hx of AKA (above knee amputation), right ([address] )  Z89 611                   Subjective: Blood sugar was elevated and feeling a little dizzy  Objective: See treatment diary below      Assessment: Pt was very deconditioned and required multiple rest breaks throughout the session  Performed gait training with RW in SOLO today but pt demo excessive lean towards L and heavily relies on UE on RW  Due to c/o shorter R side, assessed prosthetic length in standing which appeared to be normal but pt does not weightbear enough through R LE, causing him to feel uneven  Plan: Progress treatment as tolerated  Try SPC on R NV in SOLO to facilitate increased R LE weightbearing           Precautions: Fall Risk  Re-Eval: 22      Date 4/5 3/22 3/24 3/29 3/31   Visit Count 16 12 13 14 15   FOTO        Pain In        Pain Out          Manuals                                        Neuro Re-Ed         AMPRO  updated      SLS        Tandem Stance        Turn negotiation   //  2x to left/right ea dir  Max cues UE placement, WS    CS //  2x to left/right ea dir  Max cues UE placement, WS    CS   resume   Sidestepping        Static standing  2x to nataliya w/o UE support  25"  32"  CS/CGA 2x to nataliya w/o UE support  20"  29"  CS/CGA    Dynamic reach   / toss bean bag  10x R/L UE  CGA 5x to pt nataliya  W/dynamic reach/ word spell/point R/L UE Cues WB RLE  Postural awareness   1x 2 min/25 sec  Stand/dynamice reach  CG/Lizzy  Cues WB R LE/postural awareness     1x to pt nataliya 20"  Unsupported                             Ther Ex        NuStep L3  10 min  Stand/pivot transfer  CGA L3  10 min  Stand/pivot transfer  CGA L3  10 min  Stand/pivot transfer  CGA L3  10 min  Stand/pivot transfer  CGA L3  10 min  Stand/pivot transfer  CGA   Lying prone to promote hip extension   6 min prone    Bed mobility     Core Strengthening         MTP/LTP     [place]  10#  2x10  Seated/disc    palloff press  Green Tb  10x 5" ea dir  Seated disc   Bicep        Tricep        STS                        HEP        Ther Activity        Steps   Ascending fwd  Descending retro  1x BL UE  1 step @ time  CG/Lizzy  Max cues > focus WB RLE/GS 6"  //  5x  CGA  Min cues             Gait Training        With RW SOLO   20' x 3    W/' x 1  72'x1    W/ RW   CGA w/ WC follow  Cues  WS, AD placement, step stride, > DEACON  postural awareness  113' x 1  69'x1    W/ RW   CGA w/ WC follow  Cues  WS, AD placement, step stride, > DEACON  postural awareness 70'x1  60' x1  62' x 1    W/ RW   CGA w/ WC follow  Cues  WS, AD placement, step stride, > DEACON  postural awareness 92'x1  64' x1  2 trials to pt fatigue    W/ RW   CGA w/ WC follow  Cues  WS, AD placement, step stride, > DEACON  postural awareness           Modalities

## 2022-04-07 ENCOUNTER — APPOINTMENT (OUTPATIENT)
Dept: PHYSICAL THERAPY | Facility: CLINIC | Age: 62
End: 2022-04-07
Payer: COMMERCIAL

## 2022-04-07 NOTE — PROGRESS NOTES
Daily Note     Today's date: 2022  Patient name: Mayito Goff  : 1960  MRN: [id number]  Referring provider: Olu Echeverria DO  Dx: No diagnosis found  Subjective: ***      Objective: See treatment diary below      Assessment: Tolerated treatment {Tolerated treatment :}   Patient {assessment:[id number]}      Plan: {PLAN:}        Precautions: Fall Risk  Re-Eval: 22      Date 4/5 3/22 3/24 3/29 3/31   Visit Count 16 12 13 14 15   FOTO        Pain In        Pain Out          Manuals                                        Neuro Re-Ed         AMPRO  updated      SLS        Tandem Stance        Turn negotiation   //  2x to left/right ea dir  Max cues UE placement, WS    CS //  2x to left/right ea dir  Max cues UE placement, WS    CS   resume   Sidestepping        Static standing  2x to nataliya w/o UE support  25"  32"  CS/CGA 2x to nataliya w/o UE support  20"  29"  CS/CGA    Dynamic reach   / toss bean bag  10x R/L UE  CGA 5x to pt nataliya  W/dynamic reach/ word spell/point R/L UE Cues WB RLE  Postural awareness   1x 2 min/25 sec  Stand/dynamice reach  CG/Lizzy  Cues WB R LE/postural awareness     1x to pt nataliya 20"  Unsupported                             Ther Ex        NuStep L3  10 min  Stand/pivot transfer  CGA L3  10 min  Stand/pivot transfer  CGA L3  10 min  Stand/pivot transfer  CGA L3  10 min  Stand/pivot transfer  CGA L3  10 min  Stand/pivot transfer  CGA   Lying prone to promote hip extension   6 min prone    Bed mobility     Core Strengthening         MTP/LTP     [place]  10#  2x10  Seated/disc    palloff press  Green Tb  10x 5" ea dir  Seated disc   Bicep        Tricep        STS                        HEP        Ther Activity        Steps   Ascending fwd  Descending retro  1x BL UE  1 step @ time  CG/Lizzy  Max cues > focus WB RLE/GS 6"  //  5x  CGA  Min cues             Gait Training        With RW SOLO   20' x 3    W/' x 1  72'x1    W/ RW   CGA w/ WC follow  Cues  WS, AD placement, step stride, > DEACON  postural awareness  113' x 1  69'x1    W/ RW   CGA w/ WC follow  Cues  WS, AD placement, step stride, > DEACON  postural awareness 70'x1  60' x1  62' x 1    W/ RW   CGA w/ WC follow  Cues  WS, AD placement, step stride, > DEACON  postural awareness 92'x1  64' x1  2 trials to pt fatigue    W/ RW   CGA w/ WC follow  Cues  WS, AD placement, step stride, > DEACON  postural awareness           Modalities

## 2022-04-12 ENCOUNTER — OFFICE VISIT (OUTPATIENT)
Dept: PHYSICAL THERAPY | Facility: CLINIC | Age: 62
End: 2022-04-12
Payer: COMMERCIAL

## 2022-04-12 DIAGNOSIS — M21.372 FOOT DROP, LEFT: ICD-10-CM

## 2022-04-12 DIAGNOSIS — Z89.611 HX OF AKA (ABOVE KNEE AMPUTATION), RIGHT (HCC): Primary | ICD-10-CM

## 2022-04-12 PROCEDURE — 97112 NEUROMUSCULAR REEDUCATION: CPT | Performed by: PHYSICAL THERAPIST

## 2022-04-12 PROCEDURE — 97116 GAIT TRAINING THERAPY: CPT | Performed by: PHYSICAL THERAPIST

## 2022-04-12 NOTE — PROGRESS NOTES
Daily Note     Today's date: 2022  Patient name: Raimundo Andres  : 1960  MRN: [id number]  Referring provider: Kindra Hernandez DO  Dx:   Encounter Diagnosis     ICD-10-CM    1  Hx of AKA (above knee amputation), right ([facility] [address] )  Z89 611    2  Foot drop, left  M21 372                   Subjective: Did not do much last 3 days because he was sick with GI issues  Objective: See treatment diary below      Assessment: Pt fatigued very quickly, requiring multiple seated rest breaks throughout the session  He continues to demo excessive L weight shift and decreased weightbearing through R LE  Min A required to facilitate increased R LE weight bearing  Patient would benefit from continued PT       Plan: Progress treatment as tolerated            Precautions: Fall Risk  Re-Eval: 22       Date 4/5 4/12 3/24 3/29 3/31   Visit Count 16 17 13 14 15   FOTO        Pain In        Pain Out          Manuals                                        Neuro Re-Ed         AMPRO        SLS        L LE step taps  3 x 10, 8" // bars      Tandem Stance        Turn negotiation   //  2x to left/right ea dir  Max cues UE placement, WS    CS //  2x to left/right ea dir  Max cues UE placement, WS    CS   resume   Sidestepping  // bars 3 laps x 2       Static standing   2x to nataliya w/o UE support  20"  29"  CS/CGA    Dynamic reach   / toss bean bag  10x R/L UE  CGA 5x to pt nataliya  W/dynamic reach/ word spell/point R/L UE Cues WB RLE  Postural awareness   1x 2 min/25 sec  Stand/dynamice reach  CG/Lizzy  Cues WB R LE/postural awareness     1x to pt nataliya 20"  Unsupported                             Ther Ex        NuStep L3  10 min  Stand/pivot transfer  CGA  L3  10 min  Stand/pivot transfer  CGA L3  10 min  Stand/pivot transfer  CGA L3  10 min  Stand/pivot transfer  CGA   Lying prone to promote hip extension   6 min prone    Bed mobility     Core Strengthening         MTP/LTP     [place]  10#  2x10  Seated/disc    palloff press  Ernesto Mock Tb  10x 5" ea dir  Seated disc   Bicep        Tricep        STS                        HEP        Ther Activity        Steps    6"  //  5x  CGA  Min cues             Gait Training        With RW SOLO   20' x 3    W/RW 40' x 3 with RW  113' x 1  69'x1    W/ RW   CGA w/ WC follow  Cues  WS, AD placement, step stride, > DEACON  postural awareness 70'x1  60' x1  62' x 1    W/ RW   CGA w/ WC follow  Cues  WS, AD placement, step stride, > DEACON  postural awareness 92'x1  64' x1  2 trials to pt fatigue    W/ RW   CGA w/ WC follow  Cues  WS, AD placement, step stride, > DEACON  postural awareness           Modalities

## 2022-04-14 ENCOUNTER — OFFICE VISIT (OUTPATIENT)
Dept: PHYSICAL THERAPY | Facility: CLINIC | Age: 62
End: 2022-04-14
Payer: COMMERCIAL

## 2022-04-14 DIAGNOSIS — M21.372 FOOT DROP, LEFT: ICD-10-CM

## 2022-04-14 DIAGNOSIS — Z89.611 HX OF AKA (ABOVE KNEE AMPUTATION), RIGHT (HCC): Primary | ICD-10-CM

## 2022-04-14 PROCEDURE — 97110 THERAPEUTIC EXERCISES: CPT

## 2022-04-14 PROCEDURE — 97116 GAIT TRAINING THERAPY: CPT

## 2022-04-14 PROCEDURE — 97112 NEUROMUSCULAR REEDUCATION: CPT

## 2022-04-14 NOTE — PROGRESS NOTES
Daily Note     Today's date: 2022  Patient name: Lissette Clark  : 1960  MRN: [id number]  Referring provider: Edu Quinones DO  Dx:   Encounter Diagnosis     ICD-10-CM    1  Hx of AKA (above knee amputation), right ([facility] [address] )  Z89 611    2  Foot drop, left  M21 372                   Subjective: I have been not feeling great with nausea acid reflux, feeling wiped out  Trying to walk more at home       Objective: See treatment diary below      Assessment: Tolerated treatment fairly well  Pt struggles with WS to RLE > WB LLE  Pt fatigues easily with gait training with pt indicating is walking > at home  Conts with fwd trunk posture  Patient would benefit from continued PT      Plan: Continue per plan of care           Precautions: Fall Risk  Re-Eval: 22       Date      Visit Count 16 17 18     FOTO        Pain In        Pain Out          Manuals                                        Neuro Re-Ed         AMPRO        SLS        L LE step taps  3 x 10, 8" // bars 2 x 10, 8" // bars     Tandem Stance        Turn negotiation        Sidestepping  // bars 3 laps x 2  // bars 3 laps x 2      Static standing                                Ther Ex        NuStep L3  10 min  Stand/pivot transfer  CGA  L3  10 min  Stand/pivot transfer  CGA     Lying prone to promote hip extension        Core Strengthening         MTP/LTP        Bicep        Tricep        STS                        HEP        Ther Activity        Steps                 Gait Training        With RW SOLO   20' x 3    W/RW 40' x 3 with RW  Clinic/lobby  Tile<>carpet  45' x 1   40' x 1  52' x 1  32' x1  CS/CGA  Cues > DEACON, WS              Modalities

## 2022-04-15 ENCOUNTER — OFFICE VISIT (OUTPATIENT)
Dept: PAIN MEDICINE | Facility: CLINIC | Age: 62
End: 2022-04-15
Payer: COMMERCIAL

## 2022-04-15 VITALS
BODY MASS INDEX: 27.37 KG/M2 | HEART RATE: 84 BPM | DIASTOLIC BLOOD PRESSURE: 69 MMHG | HEIGHT: 68 IN | SYSTOLIC BLOOD PRESSURE: 110 MMHG

## 2022-04-15 DIAGNOSIS — E11.42 TYPE 2 DIABETES MELLITUS WITH DIABETIC POLYNEUROPATHY, UNSPECIFIED WHETHER LONG TERM INSULIN USE (HCC): ICD-10-CM

## 2022-04-15 DIAGNOSIS — Z79.4 TYPE 2 DIABETES MELLITUS WITH DIABETIC POLYNEUROPATHY, WITH LONG-TERM CURRENT USE OF INSULIN (HCC): ICD-10-CM

## 2022-04-15 DIAGNOSIS — Z89.611 S/P AKA (ABOVE KNEE AMPUTATION), RIGHT (HCC): ICD-10-CM

## 2022-04-15 DIAGNOSIS — G89.4 CHRONIC PAIN SYNDROME: Primary | ICD-10-CM

## 2022-04-15 DIAGNOSIS — E11.42 TYPE 2 DIABETES MELLITUS WITH DIABETIC POLYNEUROPATHY, WITH LONG-TERM CURRENT USE OF INSULIN (HCC): ICD-10-CM

## 2022-04-15 PROCEDURE — 99214 OFFICE O/P EST MOD 30 MIN: CPT | Performed by: NURSE PRACTITIONER

## 2022-04-15 NOTE — PROGRESS NOTES
Assessment:  1  Chronic pain syndrome    2  S/P AKA (above knee amputation), right ([address] )    3  Type 2 diabetes mellitus with diabetic polyneuropathy, unspecified whether long term insulin use ([facility] )        Plan:  While the patient was in the office today, I did have a thorough conversation regarding their chronic pain syndrome, medication management, and treatment plan options  Patient is being seen for follow-up visit  He was last seen here on 03/18/2022 at which time Lyrica was decreased to 25 milligrams 3 times daily and Cymbalta was increased to 90 milligrams at bedtime  Patient is involved in physical therapy to learn to walk using the right lower extremity prosthetic  He reports good and bad days, but states that he has been doing more therapy and has been experiencing little bit more pain  We will continue with Lyrica 25 milligrams 3 times daily and Cymbalta 90 milligrams at bedtime  He did not require refill of either of these medications during today's visit  The patient will follow-up in 2 months for medication prescription refill and reevaluation  The patient was advised to contact the office should their symptoms worsen in the interim  The patient was agreeable and verbalized an understanding  History of Present Illness: The patient is a 57 y o  male who presents for a follow up office visit in regards to Leg Pain  The patients current symptoms include complaints of right stump pain, phantom limb pain involving the right lower extremity  Current pain medications includes:  Cymbalta 90 milligrams at bedtime, Lyrica 25 milligrams 3 times daily  The patient reports that this regimen is providing 50-70 % pain relief  The patient is reporting no side effects from this pain medication regimen  I have personally reviewed and/or updated the patient's past medical history, past surgical history, family history, social history, current medications, allergies, and vital signs today  Review of Systems  Review of Systems   Constitutional: Negative for fatigue and unexpected weight change  HENT: Negative for dental problem, ear pain, hearing loss and sneezing  Eyes: Negative for visual disturbance  Respiratory: Negative for cough and chest tightness  Cardiovascular: Negative for leg swelling  Gastrointestinal: Positive for nausea and vomiting (occasional)  Negative for anal bleeding  Endocrine: Negative for heat intolerance  Genitourinary: Negative for flank pain and genital sores  Musculoskeletal: Positive for gait problem  Pain in extremity: phantom pain and cramps   Skin: Negative for wound  Allergic/Immunologic: Negative for immunocompromised state  Neurological: Negative for speech difficulty and light-headedness  Hematological: Negative for adenopathy  Psychiatric/Behavioral: Negative for confusion  The patient is not hyperactive  All other systems reviewed and are negative  Past Medical History:   Diagnosis Date    Arthritis     Diabetes mellitus ([facility] )     GERD (gastroesophageal reflux disease)     Hypercholesteremia     Hypertension     Ischemia of right lower extremity with suspected rhabdomyolysis 2/6/2021    Left Hydropneumothorax 2/10/2021    Methadone use     Pneumonia due to COVID-19 virus 1/11/2021    Subacute osteomyelitis of right femur ([facility] ) 9/13/2021       Past Surgical History:   Procedure Laterality Date    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    Procedure: AMPUTATION ABOVE KNEE (AKA);   Surgeon: Margarette Bloch, MD;  Location: [facility];  Service: Vascular    AMPUTATION ABOVE KNEE (AKA) Right 1/18/2021    Procedure: AMPUTATION ABOVE KNEE (AKA) FORMALIZATION,  R AKA wound washout, wound closure;  Surgeon: Margarette Bloch, MD;  Location: [facility];  Service: Vascular    ARTERIOGRAM Right 1/13/2021    Procedure: ARTERIOGRAM;  Surgeon: Payton Jefferson DO;  Location: [facility];  Service: Vascular    IR CHEST TUBE PLACEMENT  2/10/2021    IR LOWER EXTREMITY ANGIOGRAM  1/14/2021    THROMBECTOMY W/ EMBOLECTOMY Right 1/13/2021    Procedure: EMBOLECTOMY/THROMBECTOMY LOWER EXTREMITY;  Surgeon: Luisa Solomon DO;  [facility]: [facility];  Service: Vascular       Family History   Problem Relation Age of Onset    Diabetes Mother     Hypertension Father     Leukemia Father     Acute lymphoblastic leukemia Father     Cancer Sister        Social History     Occupational History    Not on file   Tobacco Use    Smoking status: Former Smoker    Smokeless tobacco: Never Used   Vaping Use    Vaping Use: Never used   Substance and Sexual Activity    Alcohol use: Not Currently    Drug use: No     Comment: methadone    Sexual activity: Not on file         Current Outpatient Medications:     acetaminophen (TYLENOL) 325 mg tablet, Take 3 tablets (975 mg total) by mouth every 8 (eight) hours (Patient taking differently: Take 650 mg by mouth 3 (three) times a day as needed for moderate pain ), Disp: , Rfl: 0    albuterol (PROVENTIL HFA,VENTOLIN HFA) 90 mcg/act inhaler, Inhale 2 puffs every 4 (four) hours as needed for wheezing, Disp: , Rfl: 0    atorvastatin (LIPITOR) 20 mg tablet, Take 1 tablet (20 mg total) by mouth daily, Disp: 90 tablet, Rfl: 3    BD Veo Insulin Syringe U/F 31G X 15/64" 0 5 ML MISC, USE AS DIRECTED, Disp: 100 each, Rfl: 0    Blood Glucose Monitoring Suppl (OneTouch Verio) w/Device KIT, Use to test blood sugars 3 times daily, Disp: 1 kit, Rfl: 0    cholecalciferol (VITAMIN D3) 1,000 units tablet, Take 2 tablets (2,000 Units total) by mouth daily, Disp: 12 tablet, Rfl: 0    Continuous Blood Gluc  (FreeStyle Ke 2 Gridley) JOSEPH, Use as directed, Disp: 1 each, Rfl: 0    Continuous Blood Gluc Sensor (FreeStyle Ke 2 Sensor) MISC, Change every 14 days, Disp: 6 each, Rfl: 1    Diclofenac Sodium (VOLTAREN) 1 %, Apply 4 g topically 4 (four) times a day (Patient taking differently: Apply 4 g topically 4 (four) times a day as needed ), Disp: , Rfl: 0    DULoxetine (CYMBALTA) 30 mg delayed release capsule, Take 1 capsule (30 mg total) by mouth daily at bedtime, Disp: 30 capsule, Rfl: 2    DULoxetine (CYMBALTA) 60 mg delayed release capsule, Take 1 capsule (60 mg total) by mouth daily at bedtime, Disp: 30 capsule, Rfl: 2    Eliquis 5 MG, Take 1 tablet by mouth twice daily, Disp: 180 tablet, Rfl: 2    Empagliflozin (Jardiance) 25 MG TABS, Take 1 tablet (25 mg total) by mouth every morning, Disp: 90 tablet, Rfl: 3    fluticasone (FLONASE) 50 mcg/act nasal spray, 1 spray into each nostril daily, Disp: 16 g, Rfl: 2    glucose blood test strip, Use 1 each 3 (three) times daily after meals Use as instructed, Disp: 270 each, Rfl: 3    insulin glargine (LANTUS) 100 units/mL subcutaneous injection, Inject 25 Units under the skin daily at bedtime, Disp: 10 mL, Rfl: 3    insulin lispro (HumaLOG KwikPen) 100 units/mL injection pen, Blood Glucose 150 - 224: 3 Unit of Insulin Blood Glucose 225 - 299: 4 Units of Insulin Blood Glucose 300 - 374: 5 Units of Insulin Blood Glucose 375 - 449: 6 Units of Insulin Blood Glucose greater than or equal to 450: 7 Units of Insulin, Disp: 15 mL, Rfl: 3    Insulin Pen Needle 31G X 5 MM MISC, Use daily Pt to inject 4 X daily, Disp: 100 each, Rfl: 5    Insulin Pen Needle 31G X 5 MM MISC, 30 units sq 2X daily, Disp: 100 each, Rfl: 3    lansoprazole (PREVACID) 30 mg capsule, Take 1 capsule (30 mg total) by mouth daily, Disp: 30 capsule, Rfl: 0    levocetirizine (XYZAL) 5 MG tablet, TAKE 1 TABLET BY MOUTH ONCE DAILY IN THE EVENING, Disp: 90 tablet, Rfl: 01    lidocaine (LMX) 4 % cream, Apply topically 4 (four) times a day as needed for mild pain, Disp: 30 g, Rfl: 0    lisinopril (ZESTRIL) 10 mg tablet, Take 1 tablet (10 mg total) by mouth daily, Disp: 30 tablet, Rfl: 0    magnesium oxide (MAG-OX) 400 mg, Take 1 tablet (400 mg total) by mouth 2 (two) times a day, Disp: 60 tablet, Rfl: 0    menthol-methyl salicylate (BENGAY) 37-90 % cream, Apply topically 4 (four) times a day as needed (torticollis), Disp: , Rfl: 0    methadone (DOLOPHINE) 10 mg tablet, Take 70 mg by mouth daily,, Disp: , Rfl:     multivitamin-minerals (CENTRUM ADULTS) tablet, Take 1 tablet by mouth daily, Disp: , Rfl: 0    naloxone (NARCAN) 4 mg/0 1 mL nasal spray, Administer 1 spray into a nostril  If no response after 2-3 minutes, give another dose in the other nostril using a new spray , Disp: 1 each, Rfl: 1    pregabalin (LYRICA) 25 mg capsule, Take 1 capsule (25 mg total) by mouth 3 (three) times a day, Disp: 90 capsule, Rfl: 1    silver sulfadiazine (SILVADENE,SSD) 1 % cream, Apply topically daily Apply as directed to left heel ulcer daily with each dressing change , Disp: 25 g, Rfl: 2    sitaGLIPtin-metFORMIN (JANUMET)  MG per tablet, Take 1 tablet by mouth 2 (two) times a day with meals, Disp: 60 tablet, Rfl: 0    Allergies   Allergen Reactions    Varenicline        Physical Exam:    Ht 5' 8" (1 727 m)   BMI 27 37 kg/m²     Constitutional:normal, well developed, well nourished, alert, in no distress and non-toxic and no overt pain behavior  Eyes:anicteric  HEENT:grossly intact  Neck:supple, symmetric, trachea midline and no masses   Pulmonary:even and unlabored  Cardiovascular:No edema or pitting edema present  Skin:Normal without rashes or lesions and well hydrated  Psychiatric:Mood and affect appropriate  Neurologic:Cranial Nerves II-XII grossly intact  Musculoskeletal:in wheelchair   +right AKA  Imaging  No orders to display       No orders of the defined types were placed in this encounter

## 2022-04-19 ENCOUNTER — OFFICE VISIT (OUTPATIENT)
Dept: PHYSICAL THERAPY | Facility: CLINIC | Age: 62
End: 2022-04-19
Payer: COMMERCIAL

## 2022-04-19 DIAGNOSIS — M21.372 FOOT DROP, LEFT: ICD-10-CM

## 2022-04-19 DIAGNOSIS — Z89.611 HX OF AKA (ABOVE KNEE AMPUTATION), RIGHT (HCC): Primary | ICD-10-CM

## 2022-04-19 PROCEDURE — 97112 NEUROMUSCULAR REEDUCATION: CPT | Performed by: PHYSICAL THERAPIST

## 2022-04-19 PROCEDURE — 97110 THERAPEUTIC EXERCISES: CPT | Performed by: PHYSICAL THERAPIST

## 2022-04-19 PROCEDURE — 97116 GAIT TRAINING THERAPY: CPT | Performed by: PHYSICAL THERAPIST

## 2022-04-19 NOTE — PROGRESS NOTES
Daily Note     Today's date: 2022  Patient name: Mayito Goff  : 1960  MRN: [id number]  Referring provider: Olu Echeverria DO  Dx:   Encounter Diagnosis     ICD-10-CM    1  Hx of AKA (above knee amputation), right ([address] )  [address] [id number]    2  Foot drop, left  [id number] [id number]                   Subjective: Feeling better today  Objective: See treatment diary below      Assessment: Pt was able to walk two full laps around the clinic with seated rest break in between  Improved gait mechanics when pt VC to go faster  Attempted step taps with SPC on R in SOLO but pt only able to complete a few reps and required additional UE support on L  Most limited due to decreased R LE weightbearing and required constant VC to weightbearing on R in standing  Patient would benefit from continued PT      Plan: Progress note during next visit           Precautions: Fall Risk  Re-Eval: 22       Date     Visit Count 16 17 18 19    FOTO        Pain In        Pain Out          Manuals                                        Neuro Re-Ed         AMPRO        SLS        L LE step taps  3 x 10, 8" // bars 2 x 10, 8" // bars SOLO 6" attempted with SPC and 1 UE on RW, 3x     Tandem Stance        Turn negotiation        Sidestepping  // bars 3 laps x 2  // bars 3 laps x 2      Static standing                                Ther Ex        NuStep L3  10 min  Stand/pivot transfer  CGA  L3  10 min  Stand/pivot transfer  CGA L3, 10 min     Lying prone to promote hip extension        Core Strengthening         MTP/LTP        Bicep        Tricep        STS                        HEP        Ther Activity        Steps                 Gait Training        With RW SOLO   20' x 3    W/RW 40' x 3 with RW  Clinic/lobby  Tile<>carpet  45' x 1   40' x 1  52' x 1  32' x1  CS/CGA  Cues > DEACON, WS  100' x 2             Modalities

## 2022-04-20 ENCOUNTER — TELEPHONE (OUTPATIENT)
Dept: NEUROLOGY | Facility: CLINIC | Age: 62
End: 2022-04-20

## 2022-04-20 ENCOUNTER — OFFICE VISIT (OUTPATIENT)
Dept: NEUROLOGY | Facility: CLINIC | Age: 62
End: 2022-04-20
Payer: COMMERCIAL

## 2022-04-20 VITALS
HEIGHT: 68 IN | DIASTOLIC BLOOD PRESSURE: 62 MMHG | HEART RATE: 87 BPM | BODY MASS INDEX: 27.28 KG/M2 | OXYGEN SATURATION: 96 % | SYSTOLIC BLOOD PRESSURE: 110 MMHG | WEIGHT: 180 LBS | TEMPERATURE: 98.2 F

## 2022-04-20 DIAGNOSIS — Z89.611 S/P AKA (ABOVE KNEE AMPUTATION), RIGHT (HCC): Primary | ICD-10-CM

## 2022-04-20 DIAGNOSIS — R20.1 IMPAIRED SENSATION: ICD-10-CM

## 2022-04-20 DIAGNOSIS — M21.372 FOOT DROP, LEFT: ICD-10-CM

## 2022-04-20 DIAGNOSIS — S91.302A: ICD-10-CM

## 2022-04-20 PROCEDURE — 3074F SYST BP LT 130 MM HG: CPT | Performed by: PHYSICAL MEDICINE & REHABILITATION

## 2022-04-20 PROCEDURE — 99214 OFFICE O/P EST MOD 30 MIN: CPT | Performed by: PHYSICAL MEDICINE & REHABILITATION

## 2022-04-20 PROCEDURE — 3078F DIAST BP <80 MM HG: CPT | Performed by: PHYSICAL MEDICINE & REHABILITATION

## 2022-04-20 NOTE — PATIENT INSTRUCTIONS
Please try ply socks with Doc Annas - ouitpatient PT - for better fit of your prosthesis  After Violette and Jey evaluate you in clinic, either you or they can call me to inform me as to weather you need a new socket or new leg  Also give them your left foot brace prescription        Please make an appointment with Dr Lidia Ho (Podiatry)    Call Dr Anita Wolff with questions - [phone]

## 2022-04-20 NOTE — PROGRESS NOTES
Physical Medicine [facility]  New Patient Evaluation  Marva Cool 57 y o  male      HPI/SUBJECTIVE: Marva Cool 57 y o  male right handed, with  has a past medical history of Arthritis, Diabetes mellitus ([facility] [address] ), GERD (gastroesophageal reflux disease), Hypercholesteremia, Hypertension, Ischemia of right lower extremity with suspected rhabdomyolysis (2/6/2021), Left Hydropneumothorax (2/10/2021), Methadone use, Pneumonia due to COVID-19 virus (1/11/2021), and Subacute osteomyelitis of right femur ([facility] [address] ) (9/13/2021)  Old records were reviewed personally  Patient is s/p right transfemoral amputation on 1/14/21 by Dr Aliyah Cisse secondary to ischemic limb as complication with COVID - 19  Patient also has a chronic left foot drop  Patient seen face to face today in limb loss clinic  He is accompanied by his nephew today  Presents in a wheelchair    Patient is working with [employer] with K2 level initial prosthesis  Wears a left MAFO for foot drop  Patient reports after receiving his prosthesis he was admitted to [address] [place] [address] for prosthetic training which was going well, however patient has set backs medically and patient is now working with outpatient PT again  He was not walking independently ever since receiving his prosthesis  His caregiver was providing assistance  Patient lately reports his socket is loose and the tightening suspension mechanism may not be working well  He has not tried any ply socks yet    He has an upcoming appt with his outpatient therapists and outpatient prosthetist       Incision:   Residual limb pain: none  Phantom limb sensations or pain: occasionally at night  Falls: None  Health of contralateral limb: Patient with neuropathy of left foot and 2 small areas which are open and dry - see media    Function:  Prior level of function:  Prior to amputation patient was independent and fairly active with all mobility and self care   Current level of function: Patient usually in wheelchair and requires assistance with transfers Min A, toilet transfers, and set-up and Min A for his ADLs including donning and doffing his prosthesis  His nephew provides him assistance  Patient ambulates with his prosthesis only with therapies at this time  Goals: Get to the shower, go out to eat, walk with prosthesis and visit other family with prosthesis  Barriers: left foot drop  Motivation/family support:  Has strong desire and motivation to ambulate with a prosthesis    Medically patient overall stable  Does not follow with a Podiatrist but is interested in seeing one  Mood/Cognition: good/fair cognition      ASSESSMENT/PLAN:       Billy Vides was seen today for above knee amputation, right  Diagnoses and all orders for this visit:    S/P AKA (above knee amputation), right (HCC)    Foot drop, left  -     Brace  -     Ambulatory Referral to Podiatry; Future    Open wound of left foot excluding one or more toes  -     Ambulatory Referral to Podiatry; Future    Impaired sensation  -     Ambulatory Referral to Podiatry; Future        R AKA  · Current socket appears to be loose likely from volume reduction of his residual limb over time    Agree with trialing ply socks in outpatient PT and will coordinate any socket adjustment with his prosthetist   At this time, unclear if he needs a socket replacement - will await to hear how his trial goes from his outpatient therapiest/prosthetist    · For occasional phantom limb pain: Patient can learn mirror therapy in outpatient PT if helpful   Personally reviewed most recent therapy records - patient should continue in outpatient PT      Expected K Level: K2 - with time patient may achieve household ambulation again but likely will require RW long term   Medical precautions: decreased sensation in left foot and leg and left foot drop    L foot drop  · Recommend new AFO which makes no contact with his skin as he is at high risk of skin breakdown given his impaired sensation  Recommend double upright AFO  Prescription provided    Left foot wounds - see media   · Referral to Dr Dianna Syed in [place] made today      *I have spent 45 minutes with Patient and family today in which greater than 50% of this time was spent in counseling/coordination of care regarding Intructions for management, Patient and family education and Impressions  Expanded Social History/Living Situation:   Patient lives with family member, sister in a multilevel home with 0 steps to enter  Driving: No      Review of Systems   Constitutional: Negative  HENT: Negative  Eyes: Negative  Respiratory: Negative  Cardiovascular: Negative  Gastrointestinal: Negative  Endocrine: Negative  Genitourinary: Negative  Musculoskeletal: Negative  Skin: Negative  Allergic/Immunologic: Negative  Neurological: Negative  Hematological: Negative  Psychiatric/Behavioral: Negative  ROS personally reviewed and updated    OBJECTIVE:   /62 (BP Location: Left arm, Patient Position: Sitting)   Pulse 87   Temp 98 2 °F (36 8 °C)   Ht 5' 8" (1 727 m)   Wt 81 6 kg (180 lb)   SpO2 96%   BMI 27 37 kg/m²        Physical Exam  Vitals and nursing note reviewed  Constitutional:       General: He is not in acute distress  Appearance: He is well-developed  HENT:      Head: Normocephalic  Nose: Nose normal    Eyes:      Conjunctiva/sclera: Conjunctivae normal    Cardiovascular:      Rate and Rhythm: Normal rate and regular rhythm  Pulses: Normal pulses  Pulmonary:      Effort: Pulmonary effort is normal       Breath sounds: No wheezing  Abdominal:      General: There is no distension  Palpations: Abdomen is soft  Musculoskeletal:         General: Normal range of motion  Cervical back: Neck supple        Comments: Residual limb:  Edema: None  Skin mobility: good  Palpation: non provocative to touch  No evidence of hip contracture  Incision well healed   Skin:     General: Skin is warm  Comments: See media for left foot wounds   Neurological:      Mental Status: He is alert and oriented to person, place, and time        Comments: Seen negotiating his wheelchair without difficulty Mod I  Strength in BUE 4/5 throughout  R residual limb 4-/5  Left le/5 HF, KE KF, 2/5 DF, EHL, 3/5 PF  Sensation to light touch IMPAIRED   Psychiatric:         Mood and Affect: Mood normal          Imaging: I personally reviewed pertinent imaging    Labs: Personally reviewed  Lab Results   Component Value Date    WBC 6 41 2021    HGB 13 3 2021    HCT 42 8 2021    MCV 86 2021     2021     Lab Results   Component Value Date    GLUCOSE 179 (H) 2021    CALCIUM 10 2 (H) 2022    K 5 1 2022    CO2 27 2022     2022    BUN 28 (H) 2022    CREATININE 1 16 2022         The following portions of the patient's history were reviewed and updated as appropriate: allergies, current medications, past family history, past medical history, past social history, past surgical history and problem list     Past Medical History:   Diagnosis Date    Arthritis     Diabetes mellitus ([facility] [address] )     GERD (gastroesophageal reflux disease)     Hypercholesteremia     Hypertension     Ischemia of right lower extremity with suspected rhabdomyolysis 2021    Left Hydropneumothorax 2/10/2021    Methadone use     Pneumonia due to COVID-19 virus 2021    Subacute osteomyelitis of right femur ([facility] ) 2021       Patient Active Problem List    Diagnosis Date Noted    Foot drop, left 2021    History of COVID-19 2021    Pulmonary fibrosis ([facility] [address] ) 2021    Depression 2021    Chronic hyponatremia 2021    Chronic pain syndrome 2021    Above-knee amputation of right lower extremity ([facility] [address] ) 2021    Cervical radiculopathy 2021    Chronic bilateral low back pain 04/26/2021    Moderate protein-calorie malnutrition ([place] [address] ) 03/12/2021    S/P AKA (above knee amputation), right ([place] [address] ) 03/11/2021    Drug-induced constipation 03/08/2021    Empyema lung ([place] [address] ) 03/01/2021    Prolonged Q-T interval on ECG 02/17/2021    Aortic thrombus ([place] [address] ) 01/13/2021    Type 2 diabetes mellitus, with long-term current use of insulin ([place] [address] ) 01/11/2021    Acute respiratory failure with hypoxia ([place] [address] ) 01/11/2021    Hematuria 01/11/2021    Vitamin D deficiency 12/05/2019    Mononeuropathy, unspecified 11/07/2019    Mixed hyperlipidemia 11/07/2019    Proteinuria 11/07/2019    Positive depression screening 08/08/2019    Bilateral lower extremity edema 11/27/2018    Diabetic ulcer of left heel associated with type 2 diabetes mellitus, limited to breakdown of skin ([place] [address] ) 10/30/2018    Essential hypertension 04/13/2017    Hyperlipidemia associated with type 2 diabetes mellitus ([place] [address] ) 04/13/2017    Methadone maintenance therapy patient ([place] [address] ) 04/13/2017    Type 2 diabetes mellitus with diabetic polyneuropathy ([place] [address] ) 04/13/2017    Chronic hepatitis C without hepatic coma ([place] [address] ) 02/27/2017       Past Surgical History:   Procedure Laterality Date    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    Procedure: AMPUTATION ABOVE KNEE (AKA);   Surgeon: Justin Alcocer MD;  [facility]: [facility];  Service: Vascular    AMPUTATION ABOVE KNEE (AKA) Right 1/18/2021    Procedure: AMPUTATION ABOVE KNEE (AKA) FORMALIZATION,  R AKA wound washout, wound closure;  Surgeon: Justin Alcocer MD;  [facility];  Service: Vascular    ARTERIOGRAM Right 1/13/2021    Procedure: ARTERIOGRAM;  Surgeon: Sonia Harvey DO;  [facility];  Service: Vascular    IR CHEST TUBE PLACEMENT  2/10/2021    IR LOWER EXTREMITY ANGIOGRAM  1/14/2021    THROMBECTOMY W/ EMBOLECTOMY Right 1/13/2021    Procedure: EMBOLECTOMY/THROMBECTOMY LOWER EXTREMITY;  Surgeon: Sonia Harvey DO; [facility];  Service: Vascular       Family History   Problem Relation Age of Onset    Diabetes Mother     Hypertension Father     Leukemia Father     Acute lymphoblastic leukemia Father     Cancer Sister        Social History     Allergies   Allergen Reactions    Varenicline          Current Outpatient Medications:     acetaminophen (TYLENOL) 325 mg tablet, Take 3 tablets (975 mg total) by mouth every 8 (eight) hours (Patient taking differently: Take 650 mg by mouth 3 (three) times a day as needed for moderate pain ), Disp: , Rfl: 0    albuterol (PROVENTIL HFA,VENTOLIN HFA) 90 mcg/act inhaler, Inhale 2 puffs every 4 (four) hours as needed for wheezing, Disp: , Rfl: 0    atorvastatin (LIPITOR) 20 mg tablet, Take 1 tablet (20 mg total) by mouth daily, Disp: 90 tablet, Rfl: 3    BD Veo Insulin Syringe U/F 31G X 15/64" 0 5 ML MISC, USE AS DIRECTED, Disp: 100 each, Rfl: 0    Blood Glucose Monitoring Suppl (OneTouch Verio) w/Device KIT, Use to test blood sugars 3 times daily, Disp: 1 kit, Rfl: 0    cholecalciferol (VITAMIN D3) 1,000 units tablet, Take 2 tablets (2,000 Units total) by mouth daily, Disp: 12 tablet, Rfl: 0    Continuous Blood Gluc  (FreeStyle Ke 2 [place]) JOSEPH, Use as directed, Disp: 1 each, Rfl: 0    Continuous Blood Gluc Sensor (FreeStyle Ke 2 Sensor) MISC, Change every 14 days, Disp: 6 each, Rfl: 1    Diclofenac Sodium (VOLTAREN) 1 %, Apply 4 g topically 4 (four) times a day (Patient taking differently: Apply 4 g topically 4 (four) times a day as needed ), Disp: , Rfl: 0    DULoxetine (CYMBALTA) 30 mg delayed release capsule, Take 1 capsule (30 mg total) by mouth daily at bedtime, Disp: 30 capsule, Rfl: 2    DULoxetine (CYMBALTA) 60 mg delayed release capsule, Take 1 capsule (60 mg total) by mouth daily at bedtime, Disp: 30 capsule, Rfl: 2    Eliquis 5 MG, Take 1 tablet by mouth twice daily, Disp: 180 tablet, Rfl: 2    Empagliflozin (Jardiance) 25 MG TABS, Take 1 tablet (25 mg total) by mouth every morning, Disp: 90 tablet, Rfl: 3    fluticasone (FLONASE) 50 mcg/act nasal spray, 1 spray into each nostril daily (Patient taking differently: 1 spray into each nostril if needed  ), Disp: 16 g, Rfl: 2    glucose blood test strip, Use 1 each 3 (three) times daily after meals Use as instructed, Disp: 270 each, Rfl: 3    insulin glargine (LANTUS) 100 units/mL subcutaneous injection, Inject 25 Units under the skin daily at bedtime, Disp: 10 mL, Rfl: 3    insulin lispro (HumaLOG KwikPen) 100 units/mL injection pen, Blood Glucose 150 - 224: 3 Unit of Insulin Blood Glucose 225 - 299: 4 Units of Insulin Blood Glucose 300 - 374: 5 Units of Insulin Blood Glucose 375 - 449: 6 Units of Insulin Blood Glucose greater than or equal to 450: 7 Units of Insulin, Disp: 15 mL, Rfl: 3    Insulin Pen Needle 31G X 5 MM MISC, Use daily Pt to inject 4 X daily, Disp: 100 each, Rfl: 5    Insulin Pen Needle 31G X 5 MM MISC, 30 units sq 2X daily, Disp: 100 each, Rfl: 3    lansoprazole (PREVACID) 30 mg capsule, Take 1 capsule (30 mg total) by mouth daily, Disp: 30 capsule, Rfl: 0    levocetirizine (XYZAL) 5 MG tablet, TAKE 1 TABLET BY MOUTH ONCE DAILY IN THE EVENING, Disp: 90 tablet, Rfl: 01    lidocaine (LMX) 4 % cream, Apply topically 4 (four) times a day as needed for mild pain, Disp: 30 g, Rfl: 0    lisinopril (ZESTRIL) 10 mg tablet, Take 1 tablet (10 mg total) by mouth daily, Disp: 30 tablet, Rfl: 0    magnesium oxide (MAG-OX) 400 mg, Take 1 tablet (400 mg total) by mouth 2 (two) times a day, Disp: 60 tablet, Rfl: 0    methadone (DOLOPHINE) 10 mg tablet, Take 70 mg by mouth daily,, Disp: , Rfl:     multivitamin-minerals (CENTRUM ADULTS) tablet, Take 1 tablet by mouth daily, Disp: , Rfl: 0    naloxone (NARCAN) 4 mg/0 1 mL nasal spray, Administer 1 spray into a nostril   If no response after 2-3 minutes, give another dose in the other nostril using a new spray , Disp: 1 each, Rfl: 1   pregabalin (LYRICA) 25 mg capsule, Take 1 capsule (25 mg total) by mouth 3 (three) times a day, Disp: 90 capsule, Rfl: 1    silver sulfadiazine (SILVADENE,SSD) 1 % cream, Apply topically daily Apply as directed to left heel ulcer daily with each dressing change , Disp: 25 g, Rfl: 2    sitaGLIPtin-metFORMIN (JANUMET)  MG per tablet, Take 1 tablet by mouth 2 (two) times a day with meals, Disp: 60 tablet, Rfl: 0    menthol-methyl salicylate (BENGAY) 60-80 % cream, Apply topically 4 (four) times a day as needed (torticollis) (Patient not taking: Reported on 4/20/2022 ), Disp: , Rfl: 0

## 2022-04-21 ENCOUNTER — EVALUATION (OUTPATIENT)
Dept: PHYSICAL THERAPY | Facility: CLINIC | Age: 62
End: 2022-04-21
Payer: COMMERCIAL

## 2022-04-21 DIAGNOSIS — M21.372 FOOT DROP, LEFT: ICD-10-CM

## 2022-04-21 DIAGNOSIS — Z89.611 HX OF AKA (ABOVE KNEE AMPUTATION), RIGHT (HCC): Primary | ICD-10-CM

## 2022-04-21 PROCEDURE — 97116 GAIT TRAINING THERAPY: CPT | Performed by: PHYSICAL THERAPIST

## 2022-04-21 PROCEDURE — 97112 NEUROMUSCULAR REEDUCATION: CPT | Performed by: PHYSICAL THERAPIST

## 2022-04-21 PROCEDURE — 97110 THERAPEUTIC EXERCISES: CPT | Performed by: PHYSICAL THERAPIST

## 2022-04-21 NOTE — PROGRESS NOTES
Progress Note     Today's date: 2022  Patient name: Everton Griffiths   : 1960  MRN: [id number]  Referring provider: Marshall Schmitz DO  Dx:   Encounter Diagnosis     ICD-10-CM    1  Hx of AKA (above knee amputation), right ([address] )  Z89 611    2  Foot drop, left  M21 372                   Subjective: Doing good  Feels stronger  No pain currently  Saw Dr Anita Wolff yesterday and was told to get ride of L MAFO since it is rubbing his feet  Appointment went well with her  Objective: See treatment diary below    Level of amputation: right transfemoral   [place] Scale score: 2 K Level:   Phantom pain: YES    Residual limb Inspection     Skin integrity: normal   Edema: yes       Surgical incision:  Residual limb shape: abnormal   Wounds: no  Scarring: yes   Sensation to light touch: (intact or impaired or absent)    Contralateral Limb Inspection  Skin integrity: Abnormal  Edema: no  Wounds: Yes - lateral malleoli, calleous on heel  Sensation to light touch: impaired     LE Contracture: yes    AROM Right Left   Hip flexion 8 deg    Knee flexion         Gait Assessment:   Gait speed self selected: 0 15 m/s; fast: 0 22 m/s Assistive Device: [employer]  </=  44m/s predictive of prosthesis nonuse  TUG Score: 54 secs with RW   >/= 19 sec fall risk; >/= 21 4 prosthesis nonuse at 1 year  Endurance:  2MWT score: 66ft with RW  >/= 113 meters indicates K3/K4 level    AMPPRO Score: 20/47   K-Level: 1    Assessment: Progress note completed this session  Pt is highly motivated individual however PT progress is limited and slowed due to complex medical history  He is very deconditioned and requires increased time for mobility  Recently educated pt to bring socks during the session to improve socket fit and had 5 ply donned today which pt reported improved socket comfort  Also educated pt to increase wear time  He has 8 deg R hip flexor contracture and is considered a high fall risk per TUG   Although he is testing at K1 level per AMPPRO, he is able to demo variable gait speed and has potential to achieve K2 since he is highly motivated and was ambulating without AD prior to amputation and COVID illness but he likely will require increase time and therapy to be able to achieve  At this time, pt is considered a high fall risk and would benefit from continued PT 2x/week for 12 weeks in order to maximize functional mobility and improve QOL  Due to c/o dizziness with sit to supine during testing, assessed pt for BPPV however testing was inconclusive since pt had episode of emesis and shut his eyes  Plan to reassess future session for BPPV if able  Goals  STGs:  1  Initiate and complete HEP with verbal cues  - net   2  Improve BLE strength by 1/2 grade in 4 weeks  - not met   3  Will assess balance in upcoming visits as set goals  - progressing; updated goal-Pt to be able to stand unsupported > 30" with good safety/stability in order to improve his independence with ADLs by next RE  4   Patient will complete transfers with CGA x 1   -met   LTGs:  1  Patient to be I with HEP in 12 weeks  - not met   2  Improve BLE strength to > 4 to 4+/5 t/o in 12 weeks to improve function  - not met   3  Improve BLE ROM to WNL t/o in 12 weeks to improve function  - not met   4  Ambulation is improved to maximal level of function in 12 weeks  - not met   5  Stair negotiation is improved to maximal level of function in 12 weeks  - not met   6  Recreational performance in related activities is improved to maximal level of function in 12 weeks  - not met   7  ADL performance is improved to maximal level of function in 12 weeks  - progressing   8  Patient will complete transfers mod I   - -met       New goals:  1  TUG < 51 sec to demo improved gait within 4 weeks  2  Gait speed at least 0 4 m/s to demo improved mobility within 4 weeks    3  2 MWT >110 ft to demo improved endurance within 8 weeks   4   AMPPRO > 26 to demo K2 level      Plan  Plan details: Modalities and therapy interventions prn      Patient would benefit from: skilled physical therapy  Planned modality interventions: cryotherapy and thermotherapy: hydrocollator packs  Planned therapy interventions: manual therapy, balance, balance/weight bearing training, neuromuscular re-education, patient education, postural training, self care, strengthening, stretching, therapeutic activities, therapeutic exercise, flexibility, home exercise program, prosthetic fitting/training, gait training and transfer training  Frequency: 2x week  Duration in weeks: 8  Plan of Care beginning date: 4/21/2022  Plan of Care expiration date: 7/21/2022  Treatment plan discussed with: patient       Precautions: Fall Risk  Re-Eval: 4/21/22       Date 4/5 4/12 4/14 4/19 4/21   Visit Count 16 17 18 19    FOTO        Pain In        Pain Out          Manuals                                        Neuro Re-Ed         AMPRO        SLS        L LE step taps  3 x 10, 8" // bars 2 x 10, 8" // bars SOLO 6" attempted with SPC and 1 UE on RW, 3x     Tandem Stance        Turn negotiation        Sidestepping  // bars 3 laps x 2  // bars 3 laps x 2      Static standing                                Ther Ex        NuStep L3  10 min  Stand/pivot transfer  CGA  L3  10 min  Stand/pivot transfer  CGA L3, 10 min     Lying prone to promote hip extension        Core Strengthening         MTP/LTP        Bicep        Tricep        STS                        HEP        Ther Activity        Steps                 Gait Training        With RW SOLO   20' x 3    W/RW 40' x 3 with RW  Clinic/mooby  Tile<>carpet  45' x 1   40' x 1  52' x 1  32' x1  CS/CGA  Cues > DEACON, WS  100' x 2             Modalities

## 2022-04-25 RX ORDER — SITAGLIPTIN AND METFORMIN HYDROCHLORIDE 1000; 50 MG/1; MG/1
TABLET, FILM COATED ORAL
Qty: 180 TABLET | Refills: 0 | Status: SHIPPED | OUTPATIENT
Start: 2022-04-25 | End: 2022-07-23

## 2022-04-26 ENCOUNTER — OFFICE VISIT (OUTPATIENT)
Dept: PHYSICAL THERAPY | Facility: CLINIC | Age: 62
End: 2022-04-26
Payer: COMMERCIAL

## 2022-04-26 DIAGNOSIS — Z89.611 HX OF AKA (ABOVE KNEE AMPUTATION), RIGHT (HCC): Primary | ICD-10-CM

## 2022-04-26 DIAGNOSIS — M21.372 FOOT DROP, LEFT: ICD-10-CM

## 2022-04-26 PROCEDURE — 97116 GAIT TRAINING THERAPY: CPT

## 2022-04-26 PROCEDURE — 97112 NEUROMUSCULAR REEDUCATION: CPT

## 2022-04-26 PROCEDURE — 97110 THERAPEUTIC EXERCISES: CPT

## 2022-04-26 NOTE — PROGRESS NOTES
Daily Note     Today's date: 2022  Patient name: Charles Fuller  : 1960  MRN: [id number]  Referring provider: Ramez Webb DO  Dx:   Encounter Diagnosis     ICD-10-CM    1  Hx of AKA (above knee amputation), right ([address] )  Z89 611    2  Foot drop, left  M21 372                   Subjective: I am trying to walk more at home        Objective: See treatment diary below      Assessment: Tolerated treatment well  Pt demonstrated improved endurance with ambulation  Demonstrated improved upright posture with ambulation  Pt encourage carryover with HEP  Pt denies increase pain R residual limb post rx  Pt to cont perform daily skin check   Patient demonstrated fatigue post treatment and would benefit from continued PT      Plan: Continue per plan of care          Precautions: Fall Risk  Re-Eval: 22       Date        Visit Count 21       FOTO        Pain In        Pain Out          Manuals                                        Neuro Re-Ed         AMPRO        SLS        L LE step taps        Tandem Stance        Turn negotiation        Sidestepping // bars 2 laps x 2        Static standing                                Ther Ex        NuStep L3  10 min  Stand/pivot transfer  CGA       Lying prone to promote hip extension        Core Strengthening         MTP/LTP        Bicep        Tricep        STS                        HEP        Ther Activity        Steps                 Gait Training        With RW Clinic / lobby  Tile <> carpet  3 Trials to pt fatigue  110 feet  85 feet    CS/CGA  Cues > DEACON, WS                Modalities

## 2022-04-28 ENCOUNTER — OFFICE VISIT (OUTPATIENT)
Dept: PHYSICAL THERAPY | Facility: CLINIC | Age: 62
End: 2022-04-28
Payer: COMMERCIAL

## 2022-04-28 DIAGNOSIS — Z89.611 HX OF AKA (ABOVE KNEE AMPUTATION), RIGHT (HCC): Primary | ICD-10-CM

## 2022-04-28 DIAGNOSIS — M21.372 FOOT DROP, LEFT: ICD-10-CM

## 2022-04-28 PROCEDURE — 97116 GAIT TRAINING THERAPY: CPT

## 2022-04-28 PROCEDURE — 97110 THERAPEUTIC EXERCISES: CPT

## 2022-04-28 PROCEDURE — 97112 NEUROMUSCULAR REEDUCATION: CPT

## 2022-04-28 NOTE — PROGRESS NOTES
Daily Note     Today's date: 2022  Patient name: Lissette Clark  : 1960  MRN: [id number]  Referring provider: Edu Quinones DO  Dx:   Encounter Diagnosis     ICD-10-CM    1  Hx of AKA (above knee amputation), right ([facility] [address] )  Z89 611    2  Foot drop, left  M21 372                   Subjective: I am trying to walk more around the house do about 2 laps in the house  Pt denies any recent fall  Feel I am able to stand taller/upright     Objective: See treatment diary below      Assessment: Tolerated treatment well  Noted endurance with ambulation, demonstrates improved upright posture  Conts with anterior trunk lean  Pt with > static standing / unsupported endurance  Cues for > WB R LE with focus on identifying COG  Patient demonstrated fatigue post treatment and would benefit from continued PT      Plan: Continue per plan of care          Precautions: Fall Risk  Re-Eval: 22       Date       Visit Count 21 22      FOTO        Pain In        Pain Out          Manuals                                        Neuro Re-Ed         AMPRO        SLS        L LE step taps        Tandem Stance        Turn negotiation        Sidestepping // bars 2 laps x 2  3 laps @ mirror  CS/CGA  Cues postural awareness, WS, step stride      Static standing  2 trials to pt fatigue  1 min 5 sec  26 sec  CS  Firm                              Ther Ex        NuStep L3  10 min  Stand/pivot transfer  CGA L5  10 min  Stand/pivot transfer  CGA      Lying prone to promote hip extension        Core Strengthening         MTP/LTP        Bicep        Tricep        STS                        HEP        Ther Activity        Steps                 Gait Training        With RW Clinic / lobby  Tile <> carpet  3 Trials to pt fatigue  110 feet  85 feet  64 feet    CS/CGA  Cues > DEACON, WS  Clinic / lobby  Tile <> carpet  2 Trials to pt fatigue  172 feet  114 feet w/ divided attention, HT    CS/CGA  Cues > DEACON, WS               Modalities
Alexis Portillo (spouse)

## 2022-04-29 NOTE — ASSESSMENT & PLAN NOTE
Patient presenting with malnutrition  S/p nutrition assessment  Now on PO feeds with good intake per RN and med assistant no

## 2022-04-30 DIAGNOSIS — I74.10 AORTIC THROMBUS (HCC): ICD-10-CM

## 2022-04-30 RX ORDER — LISINOPRIL 10 MG/1
TABLET ORAL
Qty: 90 TABLET | Refills: 0 | Status: SHIPPED | OUTPATIENT
Start: 2022-04-30 | End: 2022-08-02

## 2022-05-01 DIAGNOSIS — Z79.4 TYPE 2 DIABETES MELLITUS WITH HYPERGLYCEMIA, WITH LONG-TERM CURRENT USE OF INSULIN (HCC): ICD-10-CM

## 2022-05-01 DIAGNOSIS — E11.65 TYPE 2 DIABETES MELLITUS WITH HYPERGLYCEMIA, WITH LONG-TERM CURRENT USE OF INSULIN (HCC): ICD-10-CM

## 2022-05-01 RX ORDER — SYRING-NEEDL,DISP,INSUL,0.3 ML 31GX15/64"
SYRINGE, EMPTY DISPOSABLE MISCELLANEOUS
Qty: 100 EACH | Refills: 0 | Status: SHIPPED | OUTPATIENT
Start: 2022-05-01 | End: 2022-08-04 | Stop reason: SDUPTHER

## 2022-05-04 ENCOUNTER — OFFICE VISIT (OUTPATIENT)
Dept: PHYSICAL THERAPY | Facility: CLINIC | Age: 62
End: 2022-05-04
Payer: COMMERCIAL

## 2022-05-04 DIAGNOSIS — M21.372 FOOT DROP, LEFT: ICD-10-CM

## 2022-05-04 DIAGNOSIS — Z89.611 HX OF AKA (ABOVE KNEE AMPUTATION), RIGHT (HCC): Primary | ICD-10-CM

## 2022-05-04 PROCEDURE — 97116 GAIT TRAINING THERAPY: CPT

## 2022-05-04 NOTE — PROGRESS NOTES
Daily Note     Today's date: 2022  Patient name: Nicol Ho  : 1960  MRN: [id number]  Referring provider: Pearson Eisenmenger, DO  Dx:   Encounter Diagnosis     ICD-10-CM    1  Hx of AKA (above knee amputation), right ([facility] [address] )  Z89 611    2  Foot drop, left  M21 372                   Subjective: I feel uneven when standing      Objective: See treatment diary below      Assessment: Tolerated treatment well  Trial SPC in solo this date, provided Max A x 1  Pt  Prosthetic adjustments made this date/Med Moises present during PT session  Patient demonstrated fatigue post treatment and would benefit from continued PT      Plan: Continue per plan of care          Precautions: Fall Risk  Re-Eval: 22       Date      Visit Count 21 22 23     FOTO        Pain In        Pain Out          Manuals                                        Neuro Re-Ed         AMPRO        SLS        L LE step taps        Tandem Stance        Turn negotiation        Sidestepping // bars 2 laps x 2  3 laps @ mirror  CS/CGA  Cues postural awareness, WS, step stride      Static standing  2 trials to pt fatigue  1 min 5 sec  26 sec  CS  Firm 2x to pt fatigue  W/ RW  > 2 min ea  CS  Focus > WB RLE                             Ther Ex        NuStep L3  10 min  Stand/pivot transfer  CGA L5  10 min  Stand/pivot transfer  CGA      Lying prone to promote hip extension        Core Strengthening         MTP/LTP        Bicep        Tricep        STS                        HEP        Ther Activity        Steps                 Gait Training        With RW Clinic / lobby  Tile <> carpet  3 Trials to pt fatigue  110 feet  85 feet  64 feet    CS/CGA  Cues > DEACON, WS  Clinic / lobby  Tile <> carpet  2 Trials to pt fatigue  172 feet  114 feet w/ divided attention, HT    CS/CGA  Cues > DEACON, WS  Clinic  RW CS/CGA  SPC/Solo step  MaxA x1  Cues > WB RLE, step stride, postural awareness    5x to pt fatigue               Modalities Access Code: [email or web address]  URL: [email or web address]  Date: 05/04/2022  Prepared by: Jack Dumont    Exercises  · Standing Hip Abduction with Counter Support - 1 x daily - 7 x weekly - 3 sets - 10 reps  · Standing Hip Extension with Counter Support - 1 x daily - 7 x weekly - 3 sets - 10 reps  · Sidelying Hip Extension in Abduction - 1 x daily - 7 x weekly - 3 sets - 10 reps  · Side Stepping with Counter Support - 1 x daily - 7 x weekly - 3 sets - 10 reps  · Standing Toe Taps - 1 x daily - 7 x weekly - 3 sets - 10 reps

## 2022-05-05 ENCOUNTER — TELEPHONE (OUTPATIENT)
Dept: NEUROLOGY | Facility: CLINIC | Age: 62
End: 2022-05-05

## 2022-05-05 DIAGNOSIS — Z89.611 S/P AKA (ABOVE KNEE AMPUTATION), RIGHT (HCC): Primary | ICD-10-CM

## 2022-05-06 DIAGNOSIS — S78.111A ABOVE-KNEE AMPUTATION OF RIGHT LOWER EXTREMITY (HCC): Primary | ICD-10-CM

## 2022-05-07 DIAGNOSIS — K21.9 GASTROESOPHAGEAL REFLUX DISEASE, UNSPECIFIED WHETHER ESOPHAGITIS PRESENT: ICD-10-CM

## 2022-05-07 RX ORDER — LANSOPRAZOLE 30 MG/1
CAPSULE, DELAYED RELEASE ORAL
Qty: 30 CAPSULE | Refills: 0 | Status: SHIPPED | OUTPATIENT
Start: 2022-05-07 | End: 2022-06-07

## 2022-05-09 ENCOUNTER — APPOINTMENT (OUTPATIENT)
Dept: PHYSICAL THERAPY | Facility: CLINIC | Age: 62
End: 2022-05-09
Payer: COMMERCIAL

## 2022-05-11 ENCOUNTER — OFFICE VISIT (OUTPATIENT)
Dept: PHYSICAL THERAPY | Facility: CLINIC | Age: 62
End: 2022-05-11
Payer: COMMERCIAL

## 2022-05-11 DIAGNOSIS — M21.372 FOOT DROP, LEFT: ICD-10-CM

## 2022-05-11 DIAGNOSIS — Z89.611 HX OF AKA (ABOVE KNEE AMPUTATION), RIGHT (HCC): Primary | ICD-10-CM

## 2022-05-11 PROCEDURE — 97116 GAIT TRAINING THERAPY: CPT | Performed by: PHYSICAL THERAPIST

## 2022-05-11 PROCEDURE — 97112 NEUROMUSCULAR REEDUCATION: CPT | Performed by: PHYSICAL THERAPIST

## 2022-05-11 PROCEDURE — 97110 THERAPEUTIC EXERCISES: CPT | Performed by: PHYSICAL THERAPIST

## 2022-05-11 NOTE — PROGRESS NOTES
Daily Note     Today's date: 2022  Patient name: Roxy Sage  : 1960  MRN: [id number]  Referring provider: Denise Rees DO  Dx:   Encounter Diagnosis     ICD-10-CM    1  Hx of AKA (above knee amputation), right ([address] )  Z89 611    2  Foot drop, left  M21 372                   Subjective: Socket still feels tight  Only wearing leg about 2 hours a day  Not in a good mood today  Objective: See treatment diary below      Assessment: Pt easily fatigued today and multiple rest breaks required today during the session  Recommended pt to increase prosthetic wear time and perform walking program at home to increase endurance  Patient would benefit from continued PT      Plan: Progress treatment as tolerated           Precautions: Fall Risk  Re-Eval: 22       Date     Visit Count 21 22 23 24    FOTO        Pain In        Pain Out          Manuals                                        Neuro Re-Ed         AMPRO        SLS        L LE step taps    6" 5 x 2    Tandem Stance        Turn negotiation        Sidestepping // bars 2 laps x 2  3 laps @ mirror  CS/CGA  Cues postural awareness, WS, step stride  2 laps     Static standing  2 trials to pt fatigue  1 min 5 sec  26 sec  CS  Firm 2x to pt fatigue  W/ RW  > 2 min ea  CS  Focus > WB RLE                             Ther Ex        NuStep L3  10 min  Stand/pivot transfer  CGA L5  10 min  Stand/pivot transfer  CGA  L5, 10 min     Lying prone to promote hip extension        Core Strengthening         MTP/LTP        Bicep        Tricep        STS                        HEP        Ther Activity        Steps                 Gait Training        With RW Clinic / lobby  Tile <> carpet  3 Trials to pt fatigue  110 feet  85 feet  64 feet    CS/CGA  Cues > DEACON, WS  Clinic / lobby  Tile <> carpet  2 Trials to pt fatigue  172 feet  114 feet w/ divided attention, HT    CS/CGA  Cues > DEACON, WS  Clinic  RW CS/CGA  SPC/Solo step  MaxA x1  Cues > WB RLE, step stride, postural awareness    5x to pt fatigue   Clinic RW   60 ft x 2, 40 ft x 1              Modalities                             Access Code: [email or web address]  URL: [email or web address]  Date: 05/04/2022  Prepared by: Marcio Babin    Exercises  · Standing Hip Abduction with Counter Support - 1 x daily - 7 x weekly - 3 sets - 10 reps  · Standing Hip Extension with Counter Support - 1 x daily - 7 x weekly - 3 sets - 10 reps  · Sidelying Hip Extension in Abduction - 1 x daily - 7 x weekly - 3 sets - 10 reps  · Side Stepping with Counter Support - 1 x daily - 7 x weekly - 3 sets - 10 reps  · Standing Toe Taps - 1 x daily - 7 x weekly - 3 sets - 10 reps

## 2022-05-12 ENCOUNTER — OFFICE VISIT (OUTPATIENT)
Dept: PHYSICAL THERAPY | Facility: CLINIC | Age: 62
End: 2022-05-12
Payer: COMMERCIAL

## 2022-05-12 DIAGNOSIS — Z89.611 HX OF AKA (ABOVE KNEE AMPUTATION), RIGHT (HCC): Primary | ICD-10-CM

## 2022-05-12 DIAGNOSIS — M21.372 FOOT DROP, LEFT: ICD-10-CM

## 2022-05-12 PROCEDURE — 97110 THERAPEUTIC EXERCISES: CPT

## 2022-05-12 PROCEDURE — 97116 GAIT TRAINING THERAPY: CPT

## 2022-05-12 PROCEDURE — 97112 NEUROMUSCULAR REEDUCATION: CPT

## 2022-05-12 NOTE — PROGRESS NOTES
Daily Note     Today's date: 2022  Patient name: Mayito Goff  : 1960  MRN: [id number]  Referring provider: Olu Echeverria DO  Dx:   Encounter Diagnosis     ICD-10-CM    1  Hx of AKA (above knee amputation), right ([address] )  Z89 611    2  Foot drop, left  M21 372                   Subjective: I am going to meeting for amputee support group next week  I have been down a little  Trying to walk more at home, and wearing the prosthetic as much as I can working on > 2 hours/day      Objective: See treatment diary below      Assessment: Tolerated treatment fair with pt co's of increase R groin discomfort  Patient demonstrated fatigue post treatment and would benefit from continued PT      Plan: Continue per plan of care          Precautions: Fall Risk  Re-Eval: 22       Date    Visit Count 21 22 23 24 25   FOTO        Pain In        Pain Out          Manuals                                        Neuro Re-Ed         AMPRO        SLS        L LE step taps    6" 5 x 2    Tandem Stance        Turn negotiation        Sidestepping // bars 2 laps x 2  3 laps @ mirror  CS/CGA  Cues postural awareness, WS, step stride  2 laps     Static standing  2 trials to pt fatigue  1 min 5 sec  26 sec  CS  Firm 2x to pt fatigue  W/ RW  > 2 min ea  CS  Focus > WB RLE  2x to pt fatigue  W/o UE    1 min 12 sec      @ mirror                           Ther Ex        NuStep L3  10 min  Stand/pivot transfer  CGA L5  10 min  Stand/pivot transfer  CGA  L5, 10 min  L5, 10 min    Lying prone to promote hip extension        Core Strengthening         MTP/LTP        Bicep        Tricep        STS                        HEP        Ther Activity        Steps                 Gait Training        With RW Clinic / lobby  Tile <> carpet  3 Trials to pt fatigue  110 feet  85 feet  64 feet    CS/CGA  Cues > DEACON, WS  Clinic / lobby  Tile <> carpet  2 Trials to pt fatigue  172 feet  114 feet w/ divided attention, HT    CS/CGA  Cues > DEACON, WS  Clinic  RW CS/CGA  SPC/Solo step  MaxA x1  Cues > WB RLE, step stride, postural awareness    5x to pt fatigue   Clinic RW   60 ft x 2, 40 ft x 1   Clinic RW   110ft x 1  CS/CGA            Modalities                             Access Code: [email or web address]  URL: [email or web address]  Date: 05/04/2022  Prepared by: Andrea Crooked    Exercises  · Standing Hip Abduction with Counter Support - 1 x daily - 7 x weekly - 3 sets - 10 reps  · Standing Hip Extension with Counter Support - 1 x daily - 7 x weekly - 3 sets - 10 reps  · Sidelying Hip Extension in Abduction - 1 x daily - 7 x weekly - 3 sets - 10 reps  · Side Stepping with Counter Support - 1 x daily - 7 x weekly - 3 sets - 10 reps  · Standing Toe Taps - 1 x daily - 7 x weekly - 3 sets - 10 reps

## 2022-05-16 ENCOUNTER — APPOINTMENT (OUTPATIENT)
Dept: PHYSICAL THERAPY | Facility: CLINIC | Age: 62
End: 2022-05-16
Payer: COMMERCIAL

## 2022-05-17 DIAGNOSIS — E11.42 TYPE 2 DIABETES MELLITUS WITH DIABETIC POLYNEUROPATHY, WITH LONG-TERM CURRENT USE OF INSULIN (HCC): ICD-10-CM

## 2022-05-17 DIAGNOSIS — Z79.4 TYPE 2 DIABETES MELLITUS WITH DIABETIC POLYNEUROPATHY, WITH LONG-TERM CURRENT USE OF INSULIN (HCC): ICD-10-CM

## 2022-05-17 RX ORDER — INSULIN LISPRO 100 [IU]/ML
INJECTION, SOLUTION INTRAVENOUS; SUBCUTANEOUS
Qty: 15 ML | Refills: 3 | Status: SHIPPED | OUTPATIENT
Start: 2022-05-17 | End: 2022-05-27

## 2022-05-18 ENCOUNTER — OFFICE VISIT (OUTPATIENT)
Dept: PHYSICAL THERAPY | Facility: CLINIC | Age: 62
End: 2022-05-18
Payer: COMMERCIAL

## 2022-05-18 DIAGNOSIS — M21.372 FOOT DROP, LEFT: ICD-10-CM

## 2022-05-18 DIAGNOSIS — Z89.611 HX OF AKA (ABOVE KNEE AMPUTATION), RIGHT (HCC): Primary | ICD-10-CM

## 2022-05-18 PROCEDURE — 97110 THERAPEUTIC EXERCISES: CPT | Performed by: PHYSICAL THERAPIST

## 2022-05-18 PROCEDURE — 97112 NEUROMUSCULAR REEDUCATION: CPT | Performed by: PHYSICAL THERAPIST

## 2022-05-18 PROCEDURE — 97116 GAIT TRAINING THERAPY: CPT | Performed by: PHYSICAL THERAPIST

## 2022-05-23 ENCOUNTER — APPOINTMENT (OUTPATIENT)
Dept: PHYSICAL THERAPY | Facility: CLINIC | Age: 62
End: 2022-05-23
Payer: COMMERCIAL

## 2022-05-24 ENCOUNTER — APPOINTMENT (OUTPATIENT)
Dept: PHYSICAL THERAPY | Facility: CLINIC | Age: 62
End: 2022-05-24
Payer: COMMERCIAL

## 2022-05-25 ENCOUNTER — APPOINTMENT (OUTPATIENT)
Dept: LAB | Facility: CLINIC | Age: 62
End: 2022-05-25
Payer: COMMERCIAL

## 2022-05-25 ENCOUNTER — EVALUATION (OUTPATIENT)
Dept: PHYSICAL THERAPY | Facility: CLINIC | Age: 62
End: 2022-05-25
Payer: COMMERCIAL

## 2022-05-25 DIAGNOSIS — Z89.611 HX OF AKA (ABOVE KNEE AMPUTATION), RIGHT (HCC): Primary | ICD-10-CM

## 2022-05-25 DIAGNOSIS — Z79.4 TYPE 2 DIABETES MELLITUS WITH HYPERGLYCEMIA, WITH LONG-TERM CURRENT USE OF INSULIN (HCC): Chronic | ICD-10-CM

## 2022-05-25 DIAGNOSIS — E11.69 HYPERLIPIDEMIA ASSOCIATED WITH TYPE 2 DIABETES MELLITUS (HCC): ICD-10-CM

## 2022-05-25 DIAGNOSIS — E11.65 TYPE 2 DIABETES MELLITUS WITH HYPERGLYCEMIA, WITH LONG-TERM CURRENT USE OF INSULIN (HCC): Chronic | ICD-10-CM

## 2022-05-25 DIAGNOSIS — M21.372 FOOT DROP, LEFT: ICD-10-CM

## 2022-05-25 DIAGNOSIS — E78.5 HYPERLIPIDEMIA ASSOCIATED WITH TYPE 2 DIABETES MELLITUS (HCC): ICD-10-CM

## 2022-05-25 DIAGNOSIS — I10 ESSENTIAL HYPERTENSION: Chronic | ICD-10-CM

## 2022-05-25 LAB
ALBUMIN SERPL BCP-MCNC: 3.5 G/DL (ref 3.5–5)
ALP SERPL-CCNC: 117 U/L (ref 46–116)
ALT SERPL W P-5'-P-CCNC: 34 U/L (ref 12–78)
ANION GAP SERPL CALCULATED.3IONS-SCNC: 6 MMOL/L (ref 4–13)
AST SERPL W P-5'-P-CCNC: 24 U/L (ref 5–45)
BILIRUB SERPL-MCNC: 0.62 MG/DL (ref 0.2–1)
BUN SERPL-MCNC: 25 MG/DL (ref 5–25)
CALCIUM SERPL-MCNC: 9.9 MG/DL (ref 8.3–10.1)
CHLORIDE SERPL-SCNC: 101 MMOL/L (ref 100–108)
CHOLEST SERPL-MCNC: 114 MG/DL
CO2 SERPL-SCNC: 28 MMOL/L (ref 21–32)
CREAT SERPL-MCNC: 1.18 MG/DL (ref 0.6–1.3)
CREAT UR-MCNC: 72.5 MG/DL
GFR SERPL CREATININE-BSD FRML MDRD: 64 ML/MIN/1.73SQ M
GLUCOSE P FAST SERPL-MCNC: 200 MG/DL (ref 65–99)
HDLC SERPL-MCNC: 32 MG/DL
LDLC SERPL CALC-MCNC: 39 MG/DL (ref 0–100)
MICROALBUMIN UR-MCNC: 89.3 MG/L (ref 0–20)
MICROALBUMIN/CREAT 24H UR: 123 MG/G CREATININE (ref 0–30)
NONHDLC SERPL-MCNC: 82 MG/DL
POTASSIUM SERPL-SCNC: 5.4 MMOL/L (ref 3.5–5.3)
PROT SERPL-MCNC: 8.9 G/DL (ref 6.4–8.2)
SODIUM SERPL-SCNC: 135 MMOL/L (ref 136–145)
TRIGL SERPL-MCNC: 217 MG/DL

## 2022-05-25 PROCEDURE — 82570 ASSAY OF URINE CREATININE: CPT

## 2022-05-25 PROCEDURE — 97112 NEUROMUSCULAR REEDUCATION: CPT | Performed by: PHYSICAL THERAPIST

## 2022-05-25 PROCEDURE — 83036 HEMOGLOBIN GLYCOSYLATED A1C: CPT

## 2022-05-25 PROCEDURE — 80061 LIPID PANEL: CPT

## 2022-05-25 PROCEDURE — 80053 COMPREHEN METABOLIC PANEL: CPT

## 2022-05-25 PROCEDURE — 97116 GAIT TRAINING THERAPY: CPT | Performed by: PHYSICAL THERAPIST

## 2022-05-25 PROCEDURE — 97110 THERAPEUTIC EXERCISES: CPT | Performed by: PHYSICAL THERAPIST

## 2022-05-25 PROCEDURE — 82043 UR ALBUMIN QUANTITATIVE: CPT

## 2022-05-25 PROCEDURE — 36415 COLL VENOUS BLD VENIPUNCTURE: CPT

## 2022-05-25 NOTE — PROGRESS NOTES
Progress Note     Today's date: 2022  Patient name: Rafael Dumont  : 1960  MRN: [id number]  Referring provider: Luz Maria Irwin DO  Dx:   Encounter Diagnosis     ICD-10-CM    1  Hx of AKA (above knee amputation), right ([facility] [address] )  Z89 611    2  Foot drop, left  M21 372                   Subjective: Was sick yesterday and throwing up all day  Wearing about 3 hours a day  No pain  Trying to walk everyday  Objective: See treatment diary below       Assessment: Progress note completed this session  He demo improved gait per TUG and improved endurance per 2 MWT  He also demo improved function and balance per AMPPRO  Due to other comorbidities, pt continues to be functionally at K1-2 level at this time  Pt will be f/u with Evangelina Borjas for a new socket due to c/o ill fitting socket  At this time, pt is still a very high fall risk and would benefit from continued PT  Goals  STGs:  1   Initiate and complete HEP with verbal cues  - net   2   Improve BLE strength by 1/2 grade in 4 weeks  - not met   3   Will assess balance in upcoming visits as set goals   - progressing; updated goal-Pt to be able to stand unsupported > 30" with good safety/stability in order to improve his independence with ADLs by next RE  4   Patient will complete transfers with CGA x 1   -met   LTGs:  1   Patient to be I with HEP in 12 weeks  - not met   2   Improve BLE strength to > 4 to 4+/5 t/o in 12 weeks to improve function  - not met   3   Improve BLE ROM to WNL t/o in 12 weeks to improve function  - not met   4   Ambulation is improved to maximal level of function in 12 weeks  - not met   5   Stair negotiation is improved to maximal level of function in 12 weeks  - not met   6   Recreational performance in related activities is improved to maximal level of function in 12 weeks  - not met   7   ADL performance is improved to maximal level of function in 12 weeks   - progressing   8   Patient will complete transfers mod I   - -met       New goals:  1  TUG < 51 sec to demo improved gait within 4 weeks - MET   2  Gait speed at least 0 4 m/s to demo improved mobility within 4 weeks  - progress  3  2 MWT >110 ft to demo improved endurance within 8 weeks - progressing  4   AMPPRO > 26 to demo K2 level - progressing      Plan  Plan details: Modalities and therapy interventions prn     Patient would benefit from: skilled physical therapy  Planned modality interventions: cryotherapy and thermotherapy: hydrocollator packs  Planned therapy interventions: manual therapy, balance, balance/weight bearing training, neuromuscular re-education, patient education, postural training, self care, strengthening, stretching, therapeutic activities, therapeutic exercise, flexibility, home exercise program, prosthetic fitting/training, gait training and transfer training  Frequency: 2x week  Duration in weeks: 4  Plan of Care beginning date: 4/21/2022  Plan of Care expiration date: 7/21/2022  Treatment plan discussed with: patient    Gait Assessment:   Gait speed self selected: 4/21: 0 15 m/s; fast: 0 22 m/s, 5/25: SSV = 0 24 m/s, fast = 0 27 m/s     Assistive Device: [employer]             </=  44m/s predictive of prosthesis nonuse  TUG Score: 4/21: 54 secs with RW, 5/25: 44 sec with RW              >/= 19 sec fall risk; >/= 21 4 prosthesis nonuse at 1 year  Endurance:  2MWT score:   4/21: 66ft with RW, 5/25: 83 ft with RW                >/= 113 meters indicates K3/K4 level     AMPPRO Score: 4/21: 20/47                                  K-Level: 2        Precautions: Fall Risk  Re-Eval: 6/21/22       Date 5/18 5/25      Visit Count 21 22      FOTO        Pain In        Pain Out          Manuals                                        Neuro Re-Ed         AMPRO        SLS        L LE step taps    6" 5 x 2    Tandem Stance        Turn negotiation        Sidestepping    2 laps     Static standing SOLO with SPC, 3x     2x to pt fatigue  W/o UE    1 min 12 sec      @ mirror Ther Ex        NuStep    L5, 10 min  L5, 10 min    Lying prone to promote hip extension        Core Strengthening  Standing in place with SPC        MTP/LTP        Bicep        Tricep        STS        SLR  10x, BUE,  B hip abduction/ext               HEP        Ther Activity        Steps                 Gait Training        With ' x 2 with RW    SOLO with SPC and HHA 10' x 2    Clinic RW   60 ft x 2, 40 ft x 1   Clinic RW   110ft x 1  CS/CGA            Modalities                             Access Code: [email or web address]  URL: [email or web address]  Date: 05/04/2022  Prepared by: Modesto Sood    Exercises  · Standing Hip Abduction with Counter Support - 1 x daily - 7 x weekly - 3 sets - 10 reps  · Standing Hip Extension with Counter Support - 1 x daily - 7 x weekly - 3 sets - 10 reps  · Sidelying Hip Extension in Abduction - 1 x daily - 7 x weekly - 3 sets - 10 reps  · Side Stepping with Counter Support - 1 x daily - 7 x weekly - 3 sets - 10 reps  · Standing Toe Taps - 1 x daily - 7 x weekly - 3 sets - 10 reps

## 2022-05-26 ENCOUNTER — TELEPHONE (OUTPATIENT)
Dept: ENDOCRINOLOGY | Facility: CLINIC | Age: 62
End: 2022-05-26

## 2022-05-26 ENCOUNTER — TELEPHONE (OUTPATIENT)
Dept: NEUROLOGY | Facility: CLINIC | Age: 62
End: 2022-05-26

## 2022-05-26 LAB
EST. AVERAGE GLUCOSE BLD GHB EST-MCNC: 186 MG/DL
HBA1C MFR BLD: 8.1 %

## 2022-05-26 PROCEDURE — 3052F HG A1C>EQUAL 8.0%<EQUAL 9.0%: CPT | Performed by: PHYSICAL MEDICINE & REHABILITATION

## 2022-05-26 NOTE — TELEPHONE ENCOUNTER
----- Message from Jace Morrissey PA-C sent at 5/26/2022  7:52 AM EDT -----  Hemoglobin A1c has improved to 8 1  Also there is improvement in kidney function and less protein is noted in the urine  It does not look like he has a follow-up appointment schedule, would recommend setting one up to further discuss his diabetes

## 2022-05-27 ENCOUNTER — TELEPHONE (OUTPATIENT)
Dept: FAMILY MEDICINE CLINIC | Facility: CLINIC | Age: 62
End: 2022-05-27

## 2022-05-27 ENCOUNTER — OFFICE VISIT (OUTPATIENT)
Dept: FAMILY MEDICINE CLINIC | Facility: CLINIC | Age: 62
End: 2022-05-27
Payer: COMMERCIAL

## 2022-05-27 ENCOUNTER — APPOINTMENT (OUTPATIENT)
Dept: LAB | Facility: CLINIC | Age: 62
End: 2022-05-27
Payer: COMMERCIAL

## 2022-05-27 VITALS
HEART RATE: 82 BPM | BODY MASS INDEX: 27.37 KG/M2 | HEIGHT: 68 IN | OXYGEN SATURATION: 98 % | TEMPERATURE: 98.2 F | DIASTOLIC BLOOD PRESSURE: 62 MMHG | SYSTOLIC BLOOD PRESSURE: 116 MMHG

## 2022-05-27 DIAGNOSIS — E87.5 HYPERKALEMIA: ICD-10-CM

## 2022-05-27 DIAGNOSIS — Z79.4 TYPE 2 DIABETES MELLITUS WITH DIABETIC POLYNEUROPATHY, WITH LONG-TERM CURRENT USE OF INSULIN (HCC): ICD-10-CM

## 2022-05-27 DIAGNOSIS — Z79.4 TYPE 2 DIABETES MELLITUS WITH HYPERGLYCEMIA, WITH LONG-TERM CURRENT USE OF INSULIN (HCC): Chronic | ICD-10-CM

## 2022-05-27 DIAGNOSIS — E11.42 TYPE 2 DIABETES MELLITUS WITH DIABETIC POLYNEUROPATHY, WITH LONG-TERM CURRENT USE OF INSULIN (HCC): ICD-10-CM

## 2022-05-27 DIAGNOSIS — R11.11 VOMITING WITHOUT NAUSEA, UNSPECIFIED VOMITING TYPE: Primary | ICD-10-CM

## 2022-05-27 DIAGNOSIS — E11.65 TYPE 2 DIABETES MELLITUS WITH HYPERGLYCEMIA, WITH LONG-TERM CURRENT USE OF INSULIN (HCC): Chronic | ICD-10-CM

## 2022-05-27 LAB
ALBUMIN SERPL BCP-MCNC: 3.4 G/DL (ref 3.5–5)
ALP SERPL-CCNC: 132 U/L (ref 46–116)
ALT SERPL W P-5'-P-CCNC: 31 U/L (ref 12–78)
ANION GAP SERPL CALCULATED.3IONS-SCNC: 0 MMOL/L (ref 4–13)
AST SERPL W P-5'-P-CCNC: 21 U/L (ref 5–45)
BILIRUB SERPL-MCNC: 0.4 MG/DL (ref 0.2–1)
BUN SERPL-MCNC: 30 MG/DL (ref 5–25)
CALCIUM ALBUM COR SERPL-MCNC: 10.5 MG/DL (ref 8.3–10.1)
CALCIUM SERPL-MCNC: 10 MG/DL (ref 8.3–10.1)
CHLORIDE SERPL-SCNC: 104 MMOL/L (ref 100–108)
CO2 SERPL-SCNC: 30 MMOL/L (ref 21–32)
CREAT SERPL-MCNC: 1.14 MG/DL (ref 0.6–1.3)
GFR SERPL CREATININE-BSD FRML MDRD: 67 ML/MIN/1.73SQ M
GLUCOSE P FAST SERPL-MCNC: 179 MG/DL (ref 65–99)
POTASSIUM SERPL-SCNC: 5.1 MMOL/L (ref 3.5–5.3)
PROT SERPL-MCNC: 8.6 G/DL (ref 6.4–8.2)
SODIUM SERPL-SCNC: 134 MMOL/L (ref 136–145)

## 2022-05-27 PROCEDURE — 36415 COLL VENOUS BLD VENIPUNCTURE: CPT

## 2022-05-27 PROCEDURE — 80053 COMPREHEN METABOLIC PANEL: CPT

## 2022-05-27 PROCEDURE — 99214 OFFICE O/P EST MOD 30 MIN: CPT | Performed by: NURSE PRACTITIONER

## 2022-05-27 RX ORDER — ONDANSETRON 4 MG/1
4 TABLET, ORALLY DISINTEGRATING ORAL EVERY 6 HOURS PRN
Qty: 40 TABLET | Refills: 0 | Status: SHIPPED | OUTPATIENT
Start: 2022-05-27 | End: 2022-06-29

## 2022-05-27 RX ORDER — INSULIN LISPRO 100 [IU]/ML
INJECTION, SOLUTION INTRAVENOUS; SUBCUTANEOUS
Qty: 15 ML | Refills: 3 | Status: SHIPPED | OUTPATIENT
Start: 2022-05-27 | End: 2023-08-10 | Stop reason: SDUPTHER

## 2022-05-27 RX ORDER — INSULIN LISPRO 100 [IU]/ML
INJECTION, SOLUTION INTRAVENOUS; SUBCUTANEOUS
Qty: 15 ML | Refills: 3 | Status: SHIPPED | OUTPATIENT
Start: 2022-05-27 | End: 2022-05-27 | Stop reason: SDUPTHER

## 2022-05-27 RX ORDER — INSULIN GLARGINE 100 [IU]/ML
27 INJECTION, SOLUTION SUBCUTANEOUS
Qty: 10 ML | Refills: 3 | Status: SHIPPED | OUTPATIENT
Start: 2022-05-27 | End: 2022-06-27 | Stop reason: SDUPTHER

## 2022-05-27 NOTE — PATIENT INSTRUCTIONS
Call at the end of June and let me know how your sugars are and we can adjust the insulin more if we need to

## 2022-05-27 NOTE — TELEPHONE ENCOUNTER
Jey from [employer] called about patient's humalog he needs a MAX unit per day on it to [employer]     Please re-send or advise  Nikko Miller

## 2022-05-28 DIAGNOSIS — Z79.4 TYPE 2 DIABETES MELLITUS WITH DIABETIC POLYNEUROPATHY, WITH LONG-TERM CURRENT USE OF INSULIN (HCC): ICD-10-CM

## 2022-05-28 DIAGNOSIS — E11.42 TYPE 2 DIABETES MELLITUS WITH DIABETIC POLYNEUROPATHY, WITH LONG-TERM CURRENT USE OF INSULIN (HCC): ICD-10-CM

## 2022-05-28 DIAGNOSIS — S78.111A ABOVE-KNEE AMPUTATION OF RIGHT LOWER EXTREMITY (HCC): ICD-10-CM

## 2022-05-28 DIAGNOSIS — G89.4 CHRONIC PAIN SYNDROME: ICD-10-CM

## 2022-05-31 RX ORDER — PREGABALIN 25 MG/1
CAPSULE ORAL
Qty: 90 CAPSULE | Refills: 0 | Status: SHIPPED | OUTPATIENT
Start: 2022-05-31 | End: 2022-06-24 | Stop reason: SDUPTHER

## 2022-05-31 NOTE — PROGRESS NOTES
Assessment/Plan:    No problem-specific Assessment & Plan notes found for this encounter  Diagnoses and all orders for this visit:    Vomiting without nausea, unspecified vomiting type  -     Ambulatory Referral to Gastroenterology; Future  -     ondansetron (Zofran ODT) 4 mg disintegrating tablet; Take 1 tablet (4 mg total) by mouth every 6 (six) hours as needed for nausea or vomiting    Type 2 diabetes mellitus with hyperglycemia, with long-term current use of insulin ([facility])  -     insulin glargine (LANTUS) 100 units/mL subcutaneous injection; Inject 27 Units under the skin daily at bedtime  -     Basic metabolic panel; Future    Hyperkalemia  -     Comprehensive metabolic panel; Future    Type 2 diabetes mellitus with diabetic polyneuropathy, with long-term current use of insulin ([facility])  -     Discontinue: insulin lispro (HumaLOG KwikPen) 100 units/mL injection pen; Blood Glucose 150 - 224: 4 Unit of Insulin Blood Glucose 225 - 299: 5 Units of Insulin Blood Glucose 300 - 374: 6 Units of Insulin Blood Glucose 375 - 449: 7 Units of Insulin Blood Glucose greater than or equal to 450: 8 Units of Insulin  -     HEMOGLOBIN A1C W/ EAG ESTIMATION; Future        Subjective:      Patient ID: Cade Zacarias is a 57 y o  male  Patient presents for routine 3 month follow up  Patient states that since his last visit, he has been having vomiting on a daily basis  He states it is a small amount and is worse after drinking or when he first wakes up in the morning  He states that he is eating small frequent meals but it is not helping  Due to patients hx of uncontrolled T2DM, recommend he f/u with GI to rule out gastroparesis  Use zofran prn until then  S/s of dehydration reviewed  Diabetes  He presents for his follow-up diabetic visit  He has type 2 diabetes mellitus  There are no hypoglycemic associated symptoms  Pertinent negatives for hypoglycemia include no dizziness, headaches or nervousness/anxiousness   There are no diabetic associated symptoms  Pertinent negatives for diabetes include no chest pain, no fatigue, no polydipsia, no polyphagia, no polyuria and no weakness  There are no hypoglycemic complications  Symptoms are improving  Diabetic complications include nephropathy and PVD  Risk factors for coronary artery disease include hypertension, male sex, sedentary lifestyle, dyslipidemia and diabetes mellitus  Current diabetic treatment includes insulin injections and oral agent (triple therapy)  He is compliant with treatment some of the time  He is following a generally unhealthy diet  He rarely participates in exercise  An ACE inhibitor/angiotensin II receptor blocker is being taken  Eye exam is current  Hyperkalemia- patients K was 5 4  No symptoms  He is to go and have this repeated after today visit  DM- has been taking his insulin as prescribed  His HGA1C is 8 1 which did improve slightly from 8 7  Increase lantus to 27 units at bedtime and increase sliding scale by 1 unit per range  Instructions provided  He is to keep log of his morning sugars and call in one month  The following portions of the patient's history were reviewed and updated as appropriate: allergies, current medications, past family history, past medical history, past social history, past surgical history and problem list     Review of Systems   Constitutional: Negative for activity change, appetite change, diaphoresis, fatigue, fever and unexpected weight change  HENT: Negative for congestion, mouth sores, rhinorrhea, sinus pain, trouble swallowing and voice change  Eyes: Negative for photophobia and visual disturbance  Respiratory: Negative for apnea, cough, chest tightness, shortness of breath and wheezing  Cardiovascular: Negative for chest pain, palpitations and leg swelling  Gastrointestinal: Positive for vomiting   Negative for abdominal distention, abdominal pain, blood in stool, constipation, diarrhea and nausea  Endocrine: Negative for cold intolerance, heat intolerance, polydipsia, polyphagia and polyuria  Genitourinary: Negative for decreased urine volume, difficulty urinating, frequency and urgency  Musculoskeletal: Negative for arthralgias, myalgias, neck pain and neck stiffness  Skin: Negative for color change, rash and wound  Neurological: Negative for dizziness, weakness, light-headedness, numbness and headaches  Hematological: Negative for adenopathy  Does not bruise/bleed easily  Psychiatric/Behavioral: Negative for self-injury, sleep disturbance and suicidal ideas  The patient is not nervous/anxious  Objective:      /62 (BP Location: Left arm, Patient Position: Sitting, Cuff Size: Large)   Pulse 82   Temp 98 2 °F (36 8 °C) (Tympanic)   Ht 5' 8" (1 727 m)   SpO2 98%   BMI 27 37 kg/m²          Physical Exam  Vitals and nursing note reviewed  Constitutional:       General: He is not in acute distress  Appearance: Normal appearance  He is not ill-appearing or toxic-appearing  HENT:      Head: Normocephalic and atraumatic  Right Ear: Tympanic membrane, ear canal and external ear normal       Left Ear: Tympanic membrane, ear canal and external ear normal       Nose: No congestion or rhinorrhea  Mouth/Throat:      Mouth: Mucous membranes are moist       Pharynx: Oropharynx is clear  No oropharyngeal exudate or posterior oropharyngeal erythema  Eyes:      Extraocular Movements: Extraocular movements intact  Conjunctiva/sclera: Conjunctivae normal       Pupils: Pupils are equal, round, and reactive to light  Cardiovascular:      Rate and Rhythm: Normal rate and regular rhythm  Pulses: Normal pulses  Heart sounds: Normal heart sounds  No murmur heard  No friction rub  No gallop  Pulmonary:      Effort: Pulmonary effort is normal  No respiratory distress  Breath sounds: Normal breath sounds  No stridor  No wheezing     Abdominal: General: Abdomen is flat  Bowel sounds are normal       Palpations: Abdomen is soft  Tenderness: There is no abdominal tenderness  There is no guarding  Hernia: No hernia is present  Musculoskeletal:      Cervical back: Normal range of motion and neck supple  No rigidity  No muscular tenderness  Comments: AKA right    Skin:     General: Skin is warm and dry  Capillary Refill: Capillary refill takes less than 2 seconds  Coloration: Skin is not jaundiced or pale  Findings: No bruising  Neurological:      General: No focal deficit present  Mental Status: He is alert and oriented to person, place, and time  Mental status is at baseline  Psychiatric:         Mood and Affect: Mood normal          Behavior: Behavior normal          Thought Content:  Thought content normal          Judgment: Judgment normal

## 2022-06-01 ENCOUNTER — OFFICE VISIT (OUTPATIENT)
Dept: PHYSICAL THERAPY | Facility: CLINIC | Age: 62
End: 2022-06-01
Payer: COMMERCIAL

## 2022-06-01 DIAGNOSIS — M21.372 FOOT DROP, LEFT: ICD-10-CM

## 2022-06-01 DIAGNOSIS — Z89.611 HX OF AKA (ABOVE KNEE AMPUTATION), RIGHT (HCC): Primary | ICD-10-CM

## 2022-06-01 PROCEDURE — 97110 THERAPEUTIC EXERCISES: CPT | Performed by: PHYSICAL THERAPIST

## 2022-06-01 PROCEDURE — 97116 GAIT TRAINING THERAPY: CPT | Performed by: PHYSICAL THERAPIST

## 2022-06-01 PROCEDURE — 97112 NEUROMUSCULAR REEDUCATION: CPT | Performed by: PHYSICAL THERAPIST

## 2022-06-01 NOTE — PROGRESS NOTES
Daily Note     Today's date: 2022  Patient name: Nicol Ho  : 1960  MRN: [id number]  Referring provider: Pearson Eisenmenger, DO  Dx:   Encounter Diagnosis     ICD-10-CM    1  Hx of AKA (above knee amputation), right ([facility] [place] [address] )  Z89 611    2  Foot drop, left  M21 372                   Subjective: Thinks he need new socket  Has not seen Aruna Boseer yet but hoping to go on Friday  Objective: See treatment diary below      Assessment: Ambulation with RW limited today due to L LE cramps  Continues to demo very poor activity tolerance and rest breaks taken throughout the session  Reinforced the importance of increasing wear time and increase ambulation frequency at home  Patient would benefit from continued PT      Plan: Progress treatment as tolerated  Precautions: Fall Risk  Re-Eval: 22       Date      Visit Count 21 22 23     FOTO        Pain In        Pain Out          Manuals                                        Neuro Re-Ed         AMPRO        SLS        L LE step taps   6" B UE 2 x 10      Tandem Stance        Turn negotiation        Sidestepping        Static standing SOLO with SPC, 3x   B UE 1 min x 2      Step up   B UE 5 x 2                      Ther Ex        NuStep   L4, 10 min, SPM 50      Lying prone to promote hip extension        Core Strengthening  Standing in place with SPC        MTP/LTP        Bicep        Tricep        STS        SLR  10x, BUE,  B hip abduction/ext               HEP        Ther Activity        Steps                 Gait Training        With ' x 2 with RW    SOLO with [facility] and [facility] 10' x 2   120' x 1, 60' x 2, 30' x 2 with RW             Modalities                             Access Code: [email or web address]  URL: [email or web address]  Date: 2022  Prepared by: Tim Turcios    Exercises  · Standing Hip Abduction with Counter Support - 1 x daily - 7 x weekly - 3 sets - 10 reps  · Standing Hip Extension with Counter Support - 1 x daily - 7 x weekly - 3 sets - 10 reps  · Sidelying Hip Extension in Abduction - 1 x daily - 7 x weekly - 3 sets - 10 reps  · Side Stepping with Counter Support - 1 x daily - 7 x weekly - 3 sets - 10 reps  · Standing Toe Taps - 1 x daily - 7 x weekly - 3 sets - 10 reps

## 2022-06-06 ENCOUNTER — OFFICE VISIT (OUTPATIENT)
Dept: PHYSICAL THERAPY | Facility: CLINIC | Age: 62
End: 2022-06-06
Payer: COMMERCIAL

## 2022-06-06 DIAGNOSIS — M21.372 FOOT DROP, LEFT: ICD-10-CM

## 2022-06-06 DIAGNOSIS — Z89.611 HX OF AKA (ABOVE KNEE AMPUTATION), RIGHT (HCC): Primary | ICD-10-CM

## 2022-06-06 DIAGNOSIS — K21.9 GASTROESOPHAGEAL REFLUX DISEASE, UNSPECIFIED WHETHER ESOPHAGITIS PRESENT: ICD-10-CM

## 2022-06-06 PROCEDURE — 97112 NEUROMUSCULAR REEDUCATION: CPT | Performed by: PHYSICAL THERAPIST

## 2022-06-06 PROCEDURE — 97116 GAIT TRAINING THERAPY: CPT | Performed by: PHYSICAL THERAPIST

## 2022-06-06 NOTE — PROGRESS NOTES
Daily Note     Today's date: 2022  Patient name: Burt Irwin  : 1960  MRN: [id number]  Referring provider: Miles Christie DO  Dx:   Encounter Diagnosis     ICD-10-CM    1  Hx of AKA (above knee amputation), right ([address] )  Z89 611    2  Foot drop, left  M21 372                   Subjective: Wore the leg for 7 hours yesterday and 6 hours before  [facility]ent to [facility] on Friday and had measurements done  Objective: See treatment diary below      Assessment: Tolerated treatment well and was able to complete more this session  He was very challenged with gait training using SPC, requiring HHA and min-mod A for R hip due to weakness  Patient would benefit from continued PT      Plan: Progress treatment as tolerated  Precautions: Fall Risk  Re-Eval: 22       Date     Visit Count 21 22 23 24    FOTO        Pain In        Pain Out          Manuals                                        Neuro Re-Ed         AMPRO        SLS        L LE step taps   6" B UE 2 x 10      Tandem Stance        Turn negotiation        Sidestepping        Static standing SOLO with SPC, 3x   B UE 1 min x 2  With SPC 1 min x 3    Step up   B UE 5 x 2                      Ther Ex        NuStep   L4, 10 min, SPM 50      Lying prone to promote hip extension        Core Strengthening  Standing in place with SPC        MTP/LTP        Bicep        Tricep        STS        SLR  10x, BUE,  B hip abduction/ext               HEP        Ther Activity        Steps                 Gait Training        With ' x 2 with RW    SOLO with [facility] and HHA 10' x 2   120' x 1, 60' x 2, 30' x 2 with ' x 3 with RW     SOLO with [facility] and HHA 10' x 3    Attempted with unilateral crutch and HHA             Modalities                             Access Code: [email or web address]  URL: [email or web address]  Date: 2022  Prepared by: Karen Gusman    Exercises  · Standing Hip Abduction with Counter Support - 1 x daily - 7 x weekly - 3 sets - 10 reps  · Standing Hip Extension with Counter Support - 1 x daily - 7 x weekly - 3 sets - 10 reps  · Sidelying Hip Extension in Abduction - 1 x daily - 7 x weekly - 3 sets - 10 reps  · Side Stepping with Counter Support - 1 x daily - 7 x weekly - 3 sets - 10 reps  · Standing Toe Taps - 1 x daily - 7 x weekly - 3 sets - 10 reps

## 2022-06-07 RX ORDER — LANSOPRAZOLE 30 MG/1
CAPSULE, DELAYED RELEASE ORAL
Qty: 30 CAPSULE | Refills: 0 | Status: SHIPPED | OUTPATIENT
Start: 2022-06-07 | End: 2022-06-17 | Stop reason: SDUPTHER

## 2022-06-08 ENCOUNTER — APPOINTMENT (OUTPATIENT)
Dept: PHYSICAL THERAPY | Facility: CLINIC | Age: 62
End: 2022-06-08
Payer: COMMERCIAL

## 2022-06-13 ENCOUNTER — OFFICE VISIT (OUTPATIENT)
Dept: PHYSICAL THERAPY | Facility: CLINIC | Age: 62
End: 2022-06-13
Payer: COMMERCIAL

## 2022-06-13 DIAGNOSIS — Z89.611 HX OF AKA (ABOVE KNEE AMPUTATION), RIGHT (HCC): Primary | ICD-10-CM

## 2022-06-13 DIAGNOSIS — M21.372 FOOT DROP, LEFT: ICD-10-CM

## 2022-06-13 PROCEDURE — 97112 NEUROMUSCULAR REEDUCATION: CPT | Performed by: PHYSICAL THERAPIST

## 2022-06-13 PROCEDURE — 97110 THERAPEUTIC EXERCISES: CPT | Performed by: PHYSICAL THERAPIST

## 2022-06-13 PROCEDURE — 97116 GAIT TRAINING THERAPY: CPT | Performed by: PHYSICAL THERAPIST

## 2022-06-13 NOTE — PROGRESS NOTES
Daily Note     Today's date: 2022  Patient name: Larry Hills  : 1960  MRN: [id number]  Referring provider: Nicole Lopez DO  Dx:   Encounter Diagnosis     ICD-10-CM    1  Hx of AKA (above knee amputation), right ([address] )  Z89 611    2  Foot drop, left  M21 372                   Subjective: The patient states that he has good days and bad days  He states that he had an anxiety attack this morning  Tired from not sleeping well last night  Objective: See treatment diary below      Assessment: Tolerated treatment fair  Seated rest breaks needed during session today  Cues for weight shifting to R side when completing step taps  Patient demonstrated fatigue post treatment and would benefit from continued PT to improve function and mobility  Plan: Continue per plan of care          Precautions: Fall Risk  Re-Eval: 22       Date    Visit Count 21 22 23 24 25   FOTO        Pain In        Pain Out          Manuals                                        Neuro Re-Ed         AMPRO        SLS        L LE step taps   6" B UE 2 x 10   6" BUE 2 x 10   Tandem Stance        Turn negotiation        Sidestepping        Static standing SOLO with SPC, 3x   B UE 1 min x 2  With SPC 1 min x 3 With SPC   1 min x 3   Step up   B UE 5 x 2                      Ther Ex        NuStep   L4, 10 min, SPM 50   L4 10 mins   Lying prone to promote hip extension        Core Strengthening  Standing in place with SPC        MTP/LTP        Bicep        Tricep        STS        SLR  10x, BUE,  B hip abduction/ext               HEP        Ther Activity        Steps                 Gait Training        With ' x 2 with RW    SOLO with [facility] and HHA 10' x 2   120' x 1, 60' x 2, 30' x 2 with ' x 3 with RW     SOLO with [facility] and HHA 10' x 3    Attempted with unilateral crutch and HHA  100' x 3 with RW             Modalities                             Access Code: [email or web address]  URL: [email or web address]  Date: 05/04/2022  Prepared by: Modesto Sood    Exercises  · Standing Hip Abduction with Counter Support - 1 x daily - 7 x weekly - 3 sets - 10 reps  · Standing Hip Extension with Counter Support - 1 x daily - 7 x weekly - 3 sets - 10 reps  · Sidelying Hip Extension in Abduction - 1 x daily - 7 x weekly - 3 sets - 10 reps  · Side Stepping with Counter Support - 1 x daily - 7 x weekly - 3 sets - 10 reps  · Standing Toe Taps - 1 x daily - 7 x weekly - 3 sets - 10 reps

## 2022-06-15 ENCOUNTER — OFFICE VISIT (OUTPATIENT)
Dept: PHYSICAL THERAPY | Facility: CLINIC | Age: 62
End: 2022-06-15
Payer: COMMERCIAL

## 2022-06-15 DIAGNOSIS — M21.372 FOOT DROP, LEFT: ICD-10-CM

## 2022-06-15 DIAGNOSIS — Z89.611 HX OF AKA (ABOVE KNEE AMPUTATION), RIGHT (HCC): Primary | ICD-10-CM

## 2022-06-15 PROCEDURE — 97116 GAIT TRAINING THERAPY: CPT | Performed by: PHYSICAL THERAPIST

## 2022-06-15 PROCEDURE — 97110 THERAPEUTIC EXERCISES: CPT | Performed by: PHYSICAL THERAPIST

## 2022-06-15 PROCEDURE — 97112 NEUROMUSCULAR REEDUCATION: CPT | Performed by: PHYSICAL THERAPIST

## 2022-06-15 RX ORDER — COVID-19 MOLECULAR TEST ASSAY
KIT MISCELLANEOUS
COMMUNITY
Start: 2022-05-30

## 2022-06-15 NOTE — PROGRESS NOTES
Daily Note     Today's date: 6/15/2022  Patient name: Alexey Robledo  : 1960  MRN: [id number]  Referring provider: Isaías Nash DO  Dx:   Encounter Diagnosis     ICD-10-CM    1  Hx of AKA (above knee amputation), right ([address] )  Z89 611    2  Foot drop, left  M21 372                   Subjective: No new complaints  Objective: See treatment diary below      Assessment: Tolerated treatment well and demo improved activity tolerance  He was able to complete step taps on higher step and with improved posture today  Continues to demo keeping socket tight during standing and sidestepping due to hip weakness  Patient would benefit from continued PT      Plan: Progress treatment as tolerated  Precautions: Fall Risk  Re-Eval: 22       Date 6/15 5/25 6/1 6/6 6/13   Visit Count 26 22 23 24 25   FOTO        Pain In        Pain Out          Manuals                                        Neuro Re-Ed         AMPRO        SLS        L LE step taps 8" B UE 2 x 20  6" B UE 2 x 10   6" BUE 2 x 10   Tandem Stance        Turn negotiation        Sidestepping B UE 3 laps x 2       Static standing   B UE 1 min x 2  With SPC 1 min x 3 With SPC   1 min x 3   Step up   B UE 5 x 2                      Ther Ex        NuStep L4, 10 min   L4, 10 min, SPM 50   L4 10 mins   Lying prone to promote hip extension        Core Strengthening         MTP/LTP        Bicep        Tricep        STS        SLR  10x, BUE,  B hip abduction/ext               HEP        Ther Activity        Steps                 Gait Training        With ' x 2 with RW, 20' x 1 with RW   120' x 1, 60' x 2, 30' x 2 with ' x 3 with RW     SOLO with [facility] 10' x 3    Attempted with unilateral crutch and HHA  100' x 3 with RW             Modalities                             Access Code: [email or web address]  URL: [email or web address]  Date: 2022  Prepared by: Valentina Spangler    Exercises  · Standing Hip Abduction with Counter Support - 1 x daily - 7 x weekly - 3 sets - 10 reps  · Standing Hip Extension with Counter Support - 1 x daily - 7 x weekly - 3 sets - 10 reps  · Sidelying Hip Extension in Abduction - 1 x daily - 7 x weekly - 3 sets - 10 reps  · Side Stepping with Counter Support - 1 x daily - 7 x weekly - 3 sets - 10 reps  · Standing Toe Taps - 1 x daily - 7 x weekly - 3 sets - 10 reps

## 2022-06-17 ENCOUNTER — OFFICE VISIT (OUTPATIENT)
Dept: GASTROENTEROLOGY | Facility: CLINIC | Age: 62
End: 2022-06-17
Payer: COMMERCIAL

## 2022-06-17 VITALS
TEMPERATURE: 97.2 F | HEART RATE: 87 BPM | WEIGHT: 160 LBS | RESPIRATION RATE: 16 BRPM | DIASTOLIC BLOOD PRESSURE: 65 MMHG | BODY MASS INDEX: 24.25 KG/M2 | SYSTOLIC BLOOD PRESSURE: 106 MMHG | HEIGHT: 68 IN | OXYGEN SATURATION: 95 %

## 2022-06-17 DIAGNOSIS — R11.0 NAUSEA: Primary | ICD-10-CM

## 2022-06-17 DIAGNOSIS — R11.11 VOMITING WITHOUT NAUSEA, UNSPECIFIED VOMITING TYPE: ICD-10-CM

## 2022-06-17 DIAGNOSIS — K21.9 GASTROESOPHAGEAL REFLUX DISEASE, UNSPECIFIED WHETHER ESOPHAGITIS PRESENT: ICD-10-CM

## 2022-06-17 DIAGNOSIS — Z12.11 COLON CANCER SCREENING: ICD-10-CM

## 2022-06-17 DIAGNOSIS — B19.20 HEPATITIS C VIRUS INFECTION WITHOUT HEPATIC COMA, UNSPECIFIED CHRONICITY: ICD-10-CM

## 2022-06-17 PROCEDURE — 3078F DIAST BP <80 MM HG: CPT | Performed by: PHYSICIAN ASSISTANT

## 2022-06-17 PROCEDURE — 3074F SYST BP LT 130 MM HG: CPT | Performed by: PHYSICIAN ASSISTANT

## 2022-06-17 PROCEDURE — 99214 OFFICE O/P EST MOD 30 MIN: CPT | Performed by: PHYSICIAN ASSISTANT

## 2022-06-17 RX ORDER — LANSOPRAZOLE 30 MG/1
30 CAPSULE, DELAYED RELEASE ORAL DAILY
Qty: 30 CAPSULE | Refills: 3 | Status: SHIPPED | OUTPATIENT
Start: 2022-06-17 | End: 2022-06-17

## 2022-06-17 RX ORDER — PANTOPRAZOLE SODIUM 40 MG/1
40 TABLET, DELAYED RELEASE ORAL DAILY
Qty: 30 TABLET | Refills: 3 | Status: SHIPPED | OUTPATIENT
Start: 2022-06-17

## 2022-06-17 NOTE — PATIENT INSTRUCTIONS
Scheduled date of EGD/colonoscopy (as of today):8/24/2022   Physician performing EGD/colonoscopy: Dr Azra Tavarez MD  Location of EGD/colonoscopy: [address] [place] [address]  Desired bowel prep reviewed with patient:Castro/[email or web address]  Instructions reviewed with patient by:  Negrita TURCIOS   Clearances:   Eliquis, Diabetic  Follow up appointment made

## 2022-06-17 NOTE — PROGRESS NOTES
[facility] - Outpatient Follow-up Note  Kathy Julian 57 y o  male MRN: [id number]  Encounter: [id number]          ASSESSMENT AND PLAN:      1  Vomiting without nausea, unspecified vomiting type  2  Nausea: admits to a few month hx of morning nausea/vomiting  His nephew thinks it looks like acid since it is yellow in color  He is on prevacid but was on protonix in the past  Will check ruq u/s rule out biliary etiology and plan for EGD to look for underlying pathology  If normal, consider GES in setting of diabetes  - Ambulatory Referral to Gastroenterology  - EGD; Future  - US right upper quadrant; Future  -continue zofran PRN    3  Colon cancer screening: never had a colonoscopy in the past, he is agreeable to schedule one today   - Colonoscopy; Future  - polyethylene glycol (GOLYTELY) 4000 mL solution; Take as instructed by GI office for bowel prep  Dispense: 4000 mL; Refill: 0  - bisacodyl (DULCOLAX) 5 mg EC tablet; Take as instructed by GI office for bowel prep for colonoscopy  Dispense: 2 tablet; Refill: 0    4  Gastroesophageal reflux disease, unspecified whether esophagitis present  - pantoprazole (PROTONIX) 40 mg tablet; Take 1 tablet (40 mg total) by mouth daily  Dispense: 30 tablet; Refill: 3    5  Hepatitis C virus infection without hepatic coma, unspecified chronicity: hx of hep c due to IVDA  S/p treatment with mavyret in the past  Hep C RNA negative in 2017  - Hepatitis C RNA, quantitative, PCR; Future    6  Constipation: mild, using stool softeners  -okay to add miralax PRN    Risks of EGD and colonoscopy were discussed including but not limited to bleeding, infection, perforation   He understands and agrees to proceed with procedure      ______________________________________________________________________    SUBJECTIVE:  Pam Davis is a pleasant 49-year-old male with a past medical history of arthritis, diabetes, GERD, hypertension, elevated cholesterol, ischemia of right lower extremity with suspected rhabdomyolysis status post above the knee amputation, methadone use, history of drug abuse and history of hep C status post treatment with Mavyret, history of pneumonia secondary to COVID-19 who is here for nausea vomiting  His nephew who helps him make his medical decisions accompanies him  He states that for the past few months he wakes up every morning and has vomiting  His nephew states that it is yellow in color and appears to be acid  He has been on Prevacid 30 milligrams daily  This was changed from Protonix after his hospitalization for COVID last year  He rarely but occasionally will vomit in the afternoon  Vomiting is not worse after meals  He denies abdominal pain with nausea and vomiting  He has some mild constipation for which she takes stool softeners for  We discussed using MiraLax  He denies any melena or hematochezia  He has never had an EGD or colonoscopy in the past      REVIEW OF SYSTEMS IS OTHERWISE NEGATIVE  Historical Information   Past Medical History:   Diagnosis Date    Arthritis     Diabetes mellitus ([facility] )     GERD (gastroesophageal reflux disease)     Hypercholesteremia     Hypertension     Ischemia of right lower extremity with suspected rhabdomyolysis 2/6/2021    Left Hydropneumothorax 2/10/2021    Methadone use     Pneumonia due to COVID-19 virus 1/11/2021    Subacute osteomyelitis of right femur ([facility] ) 9/13/2021     Past Surgical History:   Procedure Laterality Date    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    Procedure: AMPUTATION ABOVE KNEE (AKA);   Surgeon: Jacqueline Huerta MD;  [facility];  Service: Vascular    AMPUTATION ABOVE KNEE (AKA) Right 1/18/2021    Procedure: AMPUTATION ABOVE KNEE (AKA) FORMALIZATION,  R AKA wound washout, wound closure;  Surgeon: Jacqueline Huerta MD;  [facility];  Service: Vascular    ARTERIOGRAM Right 1/13/2021    Procedure: ARTERIOGRAM;  Surgeon: David Davis ;  [facility];  Service: Vascular    IR CHEST TUBE PLACEMENT  2/10/2021    IR LOWER EXTREMITY ANGIOGRAM  1/14/2021    THROMBECTOMY W/ EMBOLECTOMY Right 1/13/2021    Procedure: EMBOLECTOMY/THROMBECTOMY LOWER EXTREMITY;  Surgeon: Luisa Solomon DO;  [facility];  Service: Vascular     Social History   Social History     Substance and Sexual Activity   Alcohol Use Not Currently     Social History     Substance and Sexual Activity   Drug Use No    Comment: methadone     Social History     Tobacco Use   Smoking Status Former Smoker   Smokeless Tobacco Never Used     Family History   Problem Relation Age of Onset    Diabetes Mother     Hypertension Father     Leukemia Father     Acute lymphoblastic leukemia Father     Cancer Sister        Meds/Allergies       Current Outpatient Medications:     acetaminophen (TYLENOL) 325 mg tablet    albuterol (PROVENTIL HFA,VENTOLIN HFA) 90 mcg/act inhaler    atorvastatin (LIPITOR) 20 mg tablet    BD Veo Insulin Syringe U/F 31G X 15/64" 0 5 ML MISC    BinaxNOW COVID-19 Ag Home Test KIT    bisacodyl (DULCOLAX) 5 mg EC tablet    Blood Glucose Monitoring Suppl (OneTouch Verio) w/Device KIT    cholecalciferol (VITAMIN D3) 1,000 units tablet    Continuous Blood Gluc  (FreeStyle No 2 Hersey) JOSEPH    Continuous Blood Gluc Sensor (FreeStyle Ke 2 Sensor) MISC    Diclofenac Sodium (VOLTAREN) 1 %    DULoxetine (CYMBALTA) 30 mg delayed release capsule    DULoxetine (CYMBALTA) 60 mg delayed release capsule    Eliquis 5 MG    Empagliflozin (Jardiance) 25 MG TABS    fluticasone (FLONASE) 50 mcg/act nasal spray    glucose blood test strip    insulin glargine (LANTUS) 100 units/mL subcutaneous injection    insulin lispro (HumaLOG KwikPen) 100 units/mL injection pen    Insulin Pen Needle 31G X 5 MM MISC    Insulin Pen Needle 31G X 5 MM MISC    Janumet  MG per tablet    levocetirizine (XYZAL) 5 MG tablet    lidocaine (LMX) 4 % cream    lisinopril (ZESTRIL) 10 mg tablet    magnesium oxide (MAG-OX) 400 mg    menthol-methyl salicylate (BENGAY) 71-34 % cream    methadone (DOLOPHINE) 10 mg tablet    multivitamin-minerals (CENTRUM ADULTS) tablet    naloxone (NARCAN) 4 mg/0 1 mL nasal spray    ondansetron (Zofran ODT) 4 mg disintegrating tablet    pantoprazole (PROTONIX) 40 mg tablet    polyethylene glycol (GOLYTELY) 4000 mL solution    pregabalin (LYRICA) 25 mg capsule    silver sulfadiazine (SILVADENE,SSD) 1 % cream    Allergies   Allergen Reactions    Varenicline            Objective     Blood pressure 106/65, pulse 87, temperature (!) 97 2 °F (36 2 °C), temperature source Tympanic, resp  rate 16, height 5' 8" (1 727 m), weight 72 6 kg (160 lb), SpO2 95 %  Body mass index is 24 33 kg/m²  PHYSICAL EXAM:      General Appearance:   Alert, cooperative, no distress, wheel chair bound   HEENT:   Normocephalic, atraumatic, anicteric      Neck:  Supple, symmetrical, trachea midline   Lungs:   Clear to auscultation bilaterally; no rales, rhonchi or wheezing; respirations unlabored    Heart[de-identified]   Regular rate and rhythm; no murmur, rub, or gallop  Abdomen:   Soft, non-tender, non-distended; normal bowel sounds; no masses, no organomegaly    Genitalia:   Deferred    Rectal:   Deferred    Extremities:  No cyanosis, clubbing or edema, R AKA   Pulses:  2+ and symmetric    Skin:  No jaundice, rashes, or lesions    Lymph nodes:  No palpable cervical lymphadenopathy        Lab Results:   No visits with results within 1 Day(s) from this visit     Latest known visit with results is:   Appointment on 05/27/2022   Component Date Value    Sodium 05/27/2022 134 (A)    Potassium 05/27/2022 5 1     Chloride 05/27/2022 104     CO2 05/27/2022 30     ANION GAP 05/27/2022 0 (A)    BUN 05/27/2022 30 (A)    Creatinine 05/27/2022 1 14     Glucose, Fasting 05/27/2022 179 (A)    Calcium 05/27/2022 10 0     Corrected Calcium 05/27/2022 10 5 (A)    AST 05/27/2022 21     ALT 05/27/2022 31     Alkaline Phosphatase 05/27/2022 132 (A)    Total Protein 05/27/2022 8 6 (A)    Albumin 05/27/2022 3 4 (A)    Total Bilirubin 05/27/2022 0 40     eGFR 05/27/2022 67          Radiology Results:   No results found

## 2022-06-20 ENCOUNTER — OFFICE VISIT (OUTPATIENT)
Dept: PHYSICAL THERAPY | Facility: CLINIC | Age: 62
End: 2022-06-20
Payer: COMMERCIAL

## 2022-06-20 ENCOUNTER — TELEPHONE (OUTPATIENT)
Dept: SURGERY | Facility: CLINIC | Age: 62
End: 2022-06-20

## 2022-06-20 DIAGNOSIS — Z89.611 HX OF AKA (ABOVE KNEE AMPUTATION), RIGHT (HCC): Primary | ICD-10-CM

## 2022-06-20 DIAGNOSIS — M21.372 FOOT DROP, LEFT: ICD-10-CM

## 2022-06-20 PROCEDURE — 97116 GAIT TRAINING THERAPY: CPT | Performed by: PHYSICAL THERAPIST

## 2022-06-20 PROCEDURE — 97110 THERAPEUTIC EXERCISES: CPT | Performed by: PHYSICAL THERAPIST

## 2022-06-20 PROCEDURE — 97112 NEUROMUSCULAR REEDUCATION: CPT | Performed by: PHYSICAL THERAPIST

## 2022-06-20 NOTE — PROGRESS NOTES
Daily Note     Today's date: 2022  Patient name: Dre Paiz  : 1960  MRN: [id number]  Referring provider: Chon Hart DO  Dx:   Encounter Diagnosis     ICD-10-CM    1  Hx of AKA (above knee amputation), right ([address] )  Z89 611    2  Foot drop, left  M21 372                   Subjective: Could not get his new leg so disappointed  They made it for wrong side  Objective: See treatment diary below      Assessment: Ambulation distance limited due to cramping in L gastroc  VC provided throughout the session for shorter steps on R and longer on L to improve gait symmetry  Improved R LE weightbearing noted compared to previous sessions  Patient would benefit from continued PT      Plan: Progress treatment as tolerated  Precautions: Fall Risk  Re-Eval: 22       Date 6/15 6/20 6/1 6/6 6/13   Visit Count 26 27 23 24 25   FOTO        Pain In        Pain Out          Manuals                                        Neuro Re-Ed         AMPRO        SLS  R SLS 10" x 2      L LE step taps 8" B UE 2 x 20 8" B UE 2 x 10  6" B UE 2 x 10   6" BUE 2 x 10   Tandem Stance        Turn negotiation        Sidestepping B UE 3 laps x 2 B UE 3 laps       Static standing   B UE 1 min x 2  With SPC 1 min x 3 With SPC   1 min x 3   Step up   B UE 5 x 2                      Ther Ex        NuStep L4, 10 min  L4, 10 min  L4, 10 min, SPM 50   L4 10 mins   Lying prone to promote hip extension        Core Strengthening         MTP/LTP        Bicep        Tricep        STS        SLR                HEP        Ther Activity        Steps                 Gait Training        With ' x 2 with RW, 20' x 1 with RW  115' x 2 with RW, 79' x 2  120' x 1, 60' x 2, 30' x 2 with ' x 3 with RW     SOLO with [facility] 10' x 3    Attempted with unilateral crutch and A  100' x 3 with RW             Modalities                             Access Code: [email or web address]  URL: [email or web address]  Date: 05/04/2022  Prepared by: Shantel Last    Exercises  · Standing Hip Abduction with Counter Support - 1 x daily - 7 x weekly - 3 sets - 10 reps  · Standing Hip Extension with Counter Support - 1 x daily - 7 x weekly - 3 sets - 10 reps  · Sidelying Hip Extension in Abduction - 1 x daily - 7 x weekly - 3 sets - 10 reps  · Side Stepping with Counter Support - 1 x daily - 7 x weekly - 3 sets - 10 reps  · Standing Toe Taps - 1 x daily - 7 x weekly - 3 sets - 10 reps

## 2022-06-22 ENCOUNTER — OFFICE VISIT (OUTPATIENT)
Dept: PHYSICAL THERAPY | Facility: CLINIC | Age: 62
End: 2022-06-22
Payer: COMMERCIAL

## 2022-06-22 DIAGNOSIS — Z89.611 HX OF AKA (ABOVE KNEE AMPUTATION), RIGHT (HCC): Primary | ICD-10-CM

## 2022-06-22 DIAGNOSIS — M21.372 FOOT DROP, LEFT: ICD-10-CM

## 2022-06-22 PROCEDURE — 97116 GAIT TRAINING THERAPY: CPT | Performed by: PHYSICAL THERAPIST

## 2022-06-22 PROCEDURE — 97112 NEUROMUSCULAR REEDUCATION: CPT | Performed by: PHYSICAL THERAPIST

## 2022-06-22 PROCEDURE — 97110 THERAPEUTIC EXERCISES: CPT | Performed by: PHYSICAL THERAPIST

## 2022-06-22 NOTE — PROGRESS NOTES
Daily Note     Today's date: 2022  Patient name: Raimundo Andres  : 1960  MRN: [id number]  Referring provider: Kindra Hernandez DO  Dx:   Encounter Diagnosis     ICD-10-CM    1  Hx of AKA (above knee amputation), right ([facility] [address] )  Z89 611    2  Foot drop, left  M21 372                   Subjective: Ate 3 bananas to see if it helps with cramping  Objective: See treatment diary below      Assessment: Tolerated treatment fair but continues to be limited due to L LE cramping  During sidestepping, as pt fatigued, pt demo increased difficulty keeping prosthetic in good alignment due to hip weakness  Patient would benefit from continued PT      Plan: Progress treatment as tolerated  Precautions: Fall Risk  Re-Eval: 22       Date 6/15 6/20 6/22     Visit Count 26 27 28     FOTO        Pain In        Pain Out          Manuals                                        Neuro Re-Ed         AMPRO        SLS  R SLS 10" x 2      L LE step taps 8" B UE 2 x 20 8" B UE 2 x 10  8" B UE 2 x 15     Tandem Stance        Turn negotiation        Sidestepping B UE 3 laps x 2 B UE 3 laps  B UE 3 laps x 2     Static standing        Step up                        Ther Ex        NuStep L4, 10 min  L4, 10 min  L5, 10 min      Lying prone to promote hip extension        Core Strengthening         MTP/LTP        Bicep        Tricep        STS        SLR                HEP        Ther Activity        Steps                 Gait Training        With ' x 2 with RW, 20' x 1 with RW  115' x 2 with RW, 79' x 2  140' x 1, 70' x 2 with RW              Modalities                             Access Code: [email or web address]  URL: [email or web address]  Date: 2022  Prepared by: Roland Oliver    Exercises  · Standing Hip Abduction with Counter Support - 1 x daily - 7 x weekly - 3 sets - 10 reps  · Standing Hip Extension with Counter Support - 1 x daily - 7 x weekly - 3 sets - 10 reps  · Sidelying Hip Extension in Abduction - 1 x daily - 7 x weekly - 3 sets - 10 reps  · Side Stepping with Counter Support - 1 x daily - 7 x weekly - 3 sets - 10 reps  · Standing Toe Taps - 1 x daily - 7 x weekly - 3 sets - 10 reps

## 2022-06-24 ENCOUNTER — OFFICE VISIT (OUTPATIENT)
Dept: PAIN MEDICINE | Facility: CLINIC | Age: 62
End: 2022-06-24
Payer: COMMERCIAL

## 2022-06-24 VITALS
HEART RATE: 90 BPM | SYSTOLIC BLOOD PRESSURE: 124 MMHG | HEIGHT: 68 IN | DIASTOLIC BLOOD PRESSURE: 74 MMHG | BODY MASS INDEX: 24.33 KG/M2

## 2022-06-24 DIAGNOSIS — G89.29 CHRONIC BILATERAL LOW BACK PAIN WITHOUT SCIATICA: ICD-10-CM

## 2022-06-24 DIAGNOSIS — G89.4 CHRONIC PAIN SYNDROME: Primary | ICD-10-CM

## 2022-06-24 DIAGNOSIS — M54.50 CHRONIC BILATERAL LOW BACK PAIN WITHOUT SCIATICA: ICD-10-CM

## 2022-06-24 DIAGNOSIS — S78.111A ABOVE-KNEE AMPUTATION OF RIGHT LOWER EXTREMITY (HCC): ICD-10-CM

## 2022-06-24 DIAGNOSIS — E11.42 TYPE 2 DIABETES MELLITUS WITH DIABETIC POLYNEUROPATHY, WITH LONG-TERM CURRENT USE OF INSULIN (HCC): ICD-10-CM

## 2022-06-24 DIAGNOSIS — E11.42 TYPE 2 DIABETES MELLITUS WITH DIABETIC POLYNEUROPATHY, UNSPECIFIED WHETHER LONG TERM INSULIN USE (HCC): ICD-10-CM

## 2022-06-24 DIAGNOSIS — Z79.4 TYPE 2 DIABETES MELLITUS WITH DIABETIC POLYNEUROPATHY, WITH LONG-TERM CURRENT USE OF INSULIN (HCC): ICD-10-CM

## 2022-06-24 PROCEDURE — 99214 OFFICE O/P EST MOD 30 MIN: CPT | Performed by: NURSE PRACTITIONER

## 2022-06-24 RX ORDER — PREGABALIN 25 MG/1
25 CAPSULE ORAL 3 TIMES DAILY
Qty: 90 CAPSULE | Refills: 2 | Status: SHIPPED | OUTPATIENT
Start: 2022-06-24

## 2022-06-24 RX ORDER — DULOXETIN HYDROCHLORIDE 30 MG/1
30 CAPSULE, DELAYED RELEASE ORAL DAILY
Qty: 30 CAPSULE | Refills: 2 | Status: SHIPPED | OUTPATIENT
Start: 2022-06-24

## 2022-06-24 NOTE — PROGRESS NOTES
Assessment:  1  Chronic pain syndrome    2  Chronic bilateral low back pain without sciatica    3  Above-knee amputation of right lower extremity ([facility] [address] )    4  Type 2 diabetes mellitus with diabetic polyneuropathy, unspecified whether long term insulin use [facility]    5  Type 2 diabetes mellitus with diabetic polyneuropathy, with long-term current use of insulin ([facility] )        Plan:  While the patient was in the office today, I did have a thorough conversation regarding their chronic pain syndrome, medication management, and treatment plan options  Patient is being seen for follow-up visit  He was last seen here on 04/15/2022  Patient tells me that he has been experiencing increased cramping in his left calf muscle for about 2 weeks  He also tells me that about 2 weeks ago he stopped taking Cymbalta because he thinks it may have been causing some GI distress  He was taking 90 mg at bedtime  I advised him to restart the Cymbalta at 30 mg daily to see if the cramping in his left calf improves  Prescription was sent to his pharmacy with refills  Continue Lyrica 25 mg 3 times daily  A prescription was sent to his pharmacy with refills  The patient will follow-up in 2 months for medication prescription refill and reevaluation  The patient was advised to contact the office should their symptoms worsen in the interim  The patient was agreeable and verbalized an understanding  History of Present Illness: The patient is a 57 y o  male who presents for a follow up office visit in regards to Knee Pain and Leg Pain  The patients current symptoms include complaints of right stump pain  Cramping in his left calf  Current pain level is a 3/10  Quality pain is described as dull, aching, cramping, pins needles  Current pain medications includes:  Lyrica 25 mg 3 times daily he stopped Cymbalta about 2 weeks ago due to possible GI distress      The patient reports that this regimen is providing up to 75 % pain relief  The patient is reporting no side effects from this pain medication regimen  I have personally reviewed and/or updated the patient's past medical history, past surgical history, family history, social history, current medications, allergies, and vital signs today  Review of Systems  Review of Systems   Respiratory: Negative for shortness of breath  Cardiovascular: Negative for chest pain  Gastrointestinal: Positive for nausea and vomiting  Negative for constipation and diarrhea  Musculoskeletal: Positive for gait problem  Negative for arthralgias, joint swelling and myalgias  Pain in extremity    Neurological: Negative for dizziness, seizures and weakness  All other systems reviewed and are negative  Past Medical History:   Diagnosis Date    Arthritis     [facility]     GERD (gastroesophageal reflux disease)     Hypercholesteremia     Hypertension     Ischemia of right lower extremity with suspected rhabdomyolysis 2/6/2021    Left Hydropneumothorax 2/10/2021    Methadone use     Pneumonia due to COVID-19 virus 1/11/2021    Subacute osteomyelitis of right femur ([facility] ) 9/13/2021       Past Surgical History:   Procedure Laterality Date    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    Procedure: AMPUTATION ABOVE KNEE (AKA);   Surgeon: Laxmi Gardner MD;  [facility]: [facility];  Service: Vascular    AMPUTATION ABOVE KNEE (AKA) Right 1/18/2021    Procedure: AMPUTATION ABOVE KNEE (AKA) FORMALIZATION,  R AKA wound washout, wound closure;  Surgeon: Laxmi Gardner MD;  [facility];  Service: Vascular    ARTERIOGRAM Right 1/13/2021    Procedure: ARTERIOGRAM;  Surgeon: Molly Barnett DO;  [facility];  Service: Vascular    IR CHEST TUBE PLACEMENT  2/10/2021    IR LOWER EXTREMITY ANGIOGRAM  1/14/2021    THROMBECTOMY W/ EMBOLECTOMY Right 1/13/2021    Procedure: EMBOLECTOMY/THROMBECTOMY LOWER EXTREMITY;  Surgeon: Molly Barnett DO; [facility];  Service: Vascular       Family History   Problem Relation Age of Onset    Diabetes Mother     Hypertension Father     Leukemia Father     Acute lymphoblastic leukemia Father     Cancer Sister        Social History     Occupational History    Not on file   Tobacco Use    Smoking status: Former Smoker    Smokeless tobacco: Never Used   Vaping Use    Vaping Use: Never used   Substance and Sexual Activity    Alcohol use: Not Currently    Drug use: No     Comment: methadone    Sexual activity: Not on file         Current Outpatient Medications:     DULoxetine (CYMBALTA) 30 mg delayed release capsule, Take 1 capsule (30 mg total) by mouth in the morning, Disp: 30 capsule, Rfl: 2    pregabalin (LYRICA) 25 mg capsule, Take 1 capsule (25 mg total) by mouth 3 (three) times a day, Disp: 90 capsule, Rfl: 2    acetaminophen (TYLENOL) 325 mg tablet, Take 3 tablets (975 mg total) by mouth every 8 (eight) hours (Patient taking differently: Take 650 mg by mouth 3 (three) times a day as needed for moderate pain), Disp: , Rfl: 0    albuterol (PROVENTIL HFA,VENTOLIN HFA) 90 mcg/act inhaler, Inhale 2 puffs every 4 (four) hours as needed for wheezing, Disp: , Rfl: 0    atorvastatin (LIPITOR) 20 mg tablet, Take 1 tablet (20 mg total) by mouth daily, Disp: 90 tablet, Rfl: 3    BD Veo Insulin Syringe U/F 31G X 15/64" 0 5 ML MISC, USE AS DIRECTED, Disp: 100 each, Rfl: 0    BinaxNOW COVID-19 Ag Home Test KIT, Use as Directed on the Package, Disp: , Rfl:     bisacodyl (DULCOLAX) 5 mg EC tablet, Take as instructed by GI office for bowel prep for colonoscopy, Disp: 2 tablet, Rfl: 0    Blood Glucose Monitoring Suppl (OneTouch Verio) w/Device KIT, Use to test blood sugars 3 times daily, Disp: 1 kit, Rfl: 0    cholecalciferol (VITAMIN D3) 1,000 units tablet, Take 2 tablets (2,000 Units total) by mouth daily, Disp: 12 tablet, Rfl: 0    Continuous Blood Gluc  (FreeStyle Ke 2 [place]) JOSEPH, Use as directed, Disp: 1 each, Rfl: 0    Continuous Blood Gluc Sensor (FreeStyle Ke 2 Sensor) MISC, Change every 14 days, Disp: 6 each, Rfl: 1    Diclofenac Sodium (VOLTAREN) 1 %, Apply 4 g topically 4 (four) times a day (Patient taking differently: Apply 4 g topically 4 (four) times a day as needed), Disp: , Rfl: 0    Eliquis 5 MG, Take 1 tablet by mouth twice daily, Disp: 180 tablet, Rfl: 2    Empagliflozin (Jardiance) 25 MG TABS, Take 1 tablet (25 mg total) by mouth every morning, Disp: 90 tablet, Rfl: 3    fluticasone (FLONASE) 50 mcg/act nasal spray, 1 spray into each nostril daily (Patient taking differently: 1 spray into each nostril if needed), Disp: 16 g, Rfl: 2    glucose blood test strip, Use 1 each 3 (three) times daily after meals Use as instructed, Disp: 270 each, Rfl: 3    insulin glargine (LANTUS) 100 units/mL subcutaneous injection, Inject 27 Units under the skin daily at bedtime, Disp: 10 mL, Rfl: 3    insulin lispro (HumaLOG KwikPen) 100 units/mL injection pen, Blood Glucose 150 - 224: 4 Unit of Insulin Blood Glucose 225 - 299: 5 Units of Insulin Blood Glucose 300 - 374: 6 Units of Insulin Blood Glucose 375 - 449: 7 Units of Insulin Blood Glucose greater than or equal to 450: 8 Units of Insulin Max of 24 units per day, Disp: 15 mL, Rfl: 3    Insulin Pen Needle 31G X 5 MM MISC, Use daily Pt to inject 4 X daily, Disp: 100 each, Rfl: 5    Insulin Pen Needle 31G X 5 MM MISC, 30 units sq 2X daily, Disp: 100 each, Rfl: 3    Janumet  MG per tablet, TAKE 1 TABLET BY MOUTH TWICE DAILY WITH MEALS, Disp: 180 tablet, Rfl: 0    levocetirizine (XYZAL) 5 MG tablet, TAKE 1 TABLET BY MOUTH ONCE DAILY IN THE EVENING, Disp: 90 tablet, Rfl: 01    lidocaine (LMX) 4 % cream, Apply topically 4 (four) times a day as needed for mild pain, Disp: 30 g, Rfl: 0    lisinopril (ZESTRIL) 10 mg tablet, Take 1 tablet by mouth once daily, Disp: 90 tablet, Rfl: 0    magnesium oxide (MAG-OX) 400 mg, Take 1 tablet (400 mg total) by mouth 2 (two) times a day, Disp: 60 tablet, Rfl: 0    menthol-methyl salicylate (BENGAY) 30-46 % cream, Apply topically 4 (four) times a day as needed (torticollis), Disp: , Rfl: 0    methadone (DOLOPHINE) 10 mg tablet, Take 70 mg by mouth daily,, Disp: , Rfl:     multivitamin-minerals (CENTRUM ADULTS) tablet, Take 1 tablet by mouth daily, Disp: , Rfl: 0    naloxone (NARCAN) 4 mg/0 1 mL nasal spray, Administer 1 spray into a nostril  If no response after 2-3 minutes, give another dose in the other nostril using a new spray , Disp: 1 each, Rfl: 1    ondansetron (Zofran ODT) 4 mg disintegrating tablet, Take 1 tablet (4 mg total) by mouth every 6 (six) hours as needed for nausea or vomiting, Disp: 40 tablet, Rfl: 0    pantoprazole (PROTONIX) 40 mg tablet, Take 1 tablet (40 mg total) by mouth daily, Disp: 30 tablet, Rfl: 3    polyethylene glycol (GOLYTELY) 4000 mL solution, Take as instructed by GI office for bowel prep, Disp: 4000 mL, Rfl: 0    silver sulfadiazine (SILVADENE,SSD) 1 % cream, Apply topically daily Apply as directed to left heel ulcer daily with each dressing change , Disp: 25 g, Rfl: 2    Allergies   Allergen Reactions    Varenicline        Physical Exam:    /74 (BP Location: Left arm, Patient Position: Sitting, Cuff Size: Standard)   Pulse 90   Ht 5' 8" (1 727 m)   BMI 24 33 kg/m²     Constitutional:normal, well developed, well nourished, alert, in no distress and non-toxic and no overt pain behavior  Eyes:anicteric  HEENT:grossly intact  Neck:supple, symmetric, trachea midline and no masses   Pulmonary:even and unlabored  Cardiovascular:No edema or pitting edema present  Skin:Normal without rashes or lesions and well hydrated  Psychiatric:Mood and affect appropriate  Neurologic:Cranial Nerves II-XII grossly intact  Musculoskeletal:in wheelchair and Right AKA    Imaging  No orders to display       No orders of the defined types were placed in this encounter

## 2022-06-27 ENCOUNTER — HOSPITAL ENCOUNTER (OUTPATIENT)
Dept: NON INVASIVE DIAGNOSTICS | Facility: HOSPITAL | Age: 62
Discharge: HOME | End: 2022-06-27
Attending: SURGERY
Payer: COMMERCIAL

## 2022-06-27 ENCOUNTER — EVALUATION (OUTPATIENT)
Dept: PHYSICAL THERAPY | Facility: CLINIC | Age: 62
End: 2022-06-27
Payer: COMMERCIAL

## 2022-06-27 DIAGNOSIS — M21.372 FOOT DROP, LEFT: ICD-10-CM

## 2022-06-27 DIAGNOSIS — E11.621 DIABETIC ULCER OF LEFT HEEL ASSOCIATED WITH TYPE 2 DIABETES MELLITUS, LIMITED TO BREAKDOWN OF SKIN (HCC): ICD-10-CM

## 2022-06-27 DIAGNOSIS — E11.65 TYPE 2 DIABETES MELLITUS WITH HYPERGLYCEMIA, WITH LONG-TERM CURRENT USE OF INSULIN (HCC): Chronic | ICD-10-CM

## 2022-06-27 DIAGNOSIS — Z89.611 HX OF AKA (ABOVE KNEE AMPUTATION), RIGHT (HCC): Primary | ICD-10-CM

## 2022-06-27 DIAGNOSIS — Z79.4 TYPE 2 DIABETES MELLITUS WITH HYPERGLYCEMIA, WITH LONG-TERM CURRENT USE OF INSULIN (HCC): Chronic | ICD-10-CM

## 2022-06-27 DIAGNOSIS — L97.421 DIABETIC ULCER OF LEFT HEEL ASSOCIATED WITH TYPE 2 DIABETES MELLITUS, LIMITED TO BREAKDOWN OF SKIN (HCC): ICD-10-CM

## 2022-06-27 PROCEDURE — 97112 NEUROMUSCULAR REEDUCATION: CPT | Performed by: PHYSICAL THERAPIST

## 2022-06-27 PROCEDURE — 97116 GAIT TRAINING THERAPY: CPT | Performed by: PHYSICAL THERAPIST

## 2022-06-27 PROCEDURE — 93926 LOWER EXTREMITY STUDY: CPT | Performed by: SURGERY

## 2022-06-27 PROCEDURE — 93926 LOWER EXTREMITY STUDY: CPT

## 2022-06-27 PROCEDURE — 93922 UPR/L XTREMITY ART 2 LEVELS: CPT | Performed by: SURGERY

## 2022-06-27 PROCEDURE — 97110 THERAPEUTIC EXERCISES: CPT | Performed by: PHYSICAL THERAPIST

## 2022-06-27 RX ORDER — INSULIN GLARGINE 100 [IU]/ML
27 INJECTION, SOLUTION SUBCUTANEOUS
Qty: 10 ML | Refills: 3 | Status: SHIPPED | OUTPATIENT
Start: 2022-06-27

## 2022-06-27 NOTE — PROGRESS NOTES
Progress Note     Today's date: 2022  Patient name: Valerie Thao  : 1960  MRN: [id number]  Referring provider: Alaina Wise DO  Dx:   Encounter Diagnosis     ICD-10-CM    1  Hx of AKA (above knee amputation), right ([facility] [address] )  Z89 611    2  Foot drop, left  M21 372                   Subjective: Has leg on longer for about 4-5 hours  Has been walking everyday with prosthesis  Has pain in L knee cap 3  Did doppler for L LE today as well and pt's nephew thinks that there is decreased blood flow  Objective: See treatment diary below      Assessment: Progress note completed this session  Although progress is slow, pt is able to tolerate more activity during PT and has been wearing prosthesis longer at home  He demo improved gait and balance per TUG, improved endurance per 2 MWT, and faster gait speed  He continues to demo very poor endurance and decreased activity tolerance which is partially limited due to L LE claudication  He would benefit from continued PT 2x/week for 8 weeks in order to maximize functional mobility and improve use of prosthesis  He is currently still waiting on receiving a new L AFO and new socket for R prosthetic leg from [address] [place] [address] since he has c/o of a lot discomfort with current socket which can lead to skin break down and prosthetic disuse         Goals  STGs:  1   Initiate and complete HEP with verbal cues  - net   2   Improve BLE strength by 1/2 grade in 4 weeks  - not met   3   Will assess balance in upcoming visits as set goals   - progressing; updated goal-Pt to be able to stand unsupported > 30" with good safety/stability in order to improve his independence with ADLs by next RE  4   Patient will complete transfers with CGA x 1   -met   LTGs:  1   Patient to be I with HEP in 12 weeks  - not met   2   Improve BLE strength to > 4 to 4+/5 t/o in 12 weeks to improve function  - not met   3   Improve BLE ROM to WNL t/o in 12 weeks to improve function  - not met   4   Ambulation is improved to maximal level of function in 12 weeks  - not met   5   Stair negotiation is improved to maximal level of function in 12 weeks  - not met   6   Recreational performance in related activities is improved to maximal level of function in 12 weeks  - not met   7   ADL performance is improved to maximal level of function in 12 weeks  - progressing   8   Patient will complete transfers mod I   - -met         New goals:  1  TUG < 51 sec to demo improved gait within 4 weeks - MET   2  Gait speed at least 0 4 m/s to demo improved mobility within 4 weeks  - progress  3  2 MWT >110 ft to demo improved endurance within 8 weeks - progressing  4   AMPPRO > 26 to demo K2 level - progressing      Plan  Plan details: Modalities and therapy interventions prn     Patient would benefit from: skilled physical therapy  Planned modality interventions: cryotherapy and thermotherapy: hydrocollator packs  Planned therapy interventions: manual therapy, balance, balance/weight bearing training, neuromuscular re-education, patient education, postural training, self care, strengthening, stretching, therapeutic activities, therapeutic exercise, flexibility, home exercise program, prosthetic fitting/training, gait training and transfer training  Frequency: 2x week  Duration in weeks: 8  Plan of Care beginning date: 6/27/2022  Plan of Care expiration date: 9/27/2022  Treatment plan discussed with: patient     Gait Assessment:   Gait speed self selected: 4/21: 0 15 m/s; fast: 0 22 m/s, 5/25: SSV = 0 24 m/s, fast = 0 27 m/s  6/27: SSV = 0 21 m/s, fast = 0 32 m/s     Assistive Device: WW             </=  44m/s predictive of prosthesis nonuse  TUG Score: 4/21: 54 secs with RW, 5/25: 44 sec with RW, 6/27: 40 37 sec with RW      >/= 19 sec fall risk; >/= 21 4 prosthesis nonuse at 1 year  Endurance:  2MWT score:   4/21: 66ft with RW, 5/25: 83 ft with RW, 6/27: 98ft with RW               >/= 113 meters indicates K3/K4 level     AMPPRO Score: 4/21: 20/47                                  K-Level: 2   Tracy: 6/27: 3        Precautions: Fall Risk  Re-Eval: 6/25/22       Date 6/15 6/20 6/22 6/27    Visit Count 26 27 28 29    FOTO        Pain In        Pain Out          Manuals                                        Neuro Re-Ed         AMPRO        SLS  R SLS 10" x 2      L LE step taps 8" B UE 2 x 20 8" B UE 2 x 10  8" B UE 2 x 15     Tandem Stance        Turn negotiation        Sidestepping B UE 3 laps x 2 B UE 3 laps  B UE 3 laps x 2     Static standing        Step up                        Ther Ex        NuStep L4, 10 min  L4, 10 min  L5, 10 min  L5, 10 min     Lying prone to promote hip extension        Core Strengthening         MTP/LTP        Bicep        Tricep        STS        SLR    10 ea LE abd/flex            HEP        Ther Activity        Steps                 Gait Training        With ' x 2 with RW, 20' x 1 with RW  115' x 2 with RW, 70' x 2  140' x 1, 70' x 2 with RW  100' x 1,     between equipments            Modalities                             Access Code: [email or web address]  URL: [email or web address]  Date: 05/04/2022  Prepared by: Tim Turcios    Exercises  · Standing Hip Abduction with Counter Support - 1 x daily - 7 x weekly - 3 sets - 10 reps  · Standing Hip Extension with Counter Support - 1 x daily - 7 x weekly - 3 sets - 10 reps  · Sidelying Hip Extension in Abduction - 1 x daily - 7 x weekly - 3 sets - 10 reps  · Side Stepping with Counter Support - 1 x daily - 7 x weekly - 3 sets - 10 reps  · Standing Toe Taps - 1 x daily - 7 x weekly - 3 sets - 10 reps

## 2022-06-28 ENCOUNTER — NURSE TRIAGE (OUTPATIENT)
Dept: OTHER | Facility: OTHER | Age: 62
End: 2022-06-28

## 2022-06-28 NOTE — TELEPHONE ENCOUNTER
Regarding: med dispensing issue - protonix   ----- Message from [facility] sent at 6/28/2022  4:14 PM EDT -----  "I am calling about my uncle, I am his nephew (on signed comm consent form) he is a patient of [facility] and he was supposed to have a medication called protonix 40 mg sent in but when I went to [facility] they said they do not have record of this being sent in, is anyone able to help us maybe get this resent into the pharmacy?  It was a medication he was supposed to start taking after his visit "

## 2022-06-28 NOTE — TELEPHONE ENCOUNTER
Reason for Disposition   [1] Caller has NON-URGENT medicine question about med that PCP prescribed AND [2] triager unable to answer question    Answer Assessment - Initial Assessment Questions  1  NAME of MEDICATION: "What medicine are you calling about?"      Protonix 40mg  2  QUESTION: "What is your question?" (e g , medication refill, side effect)     Nephew called in asking about patient's protonix-was not available from the pharmacy  Spoke with pharmacist who stated that medication needed prior auth  Nephew stated he had some protonix with same dosage and gave it to patient; has enough for a few days  Advised office would start prior process tomorrow and to call back with any further questions or concerns      Protocols used: MEDICATION QUESTION CALL-ADULT-

## 2022-06-29 ENCOUNTER — APPOINTMENT (OUTPATIENT)
Dept: PHYSICAL THERAPY | Facility: CLINIC | Age: 62
End: 2022-06-29
Payer: COMMERCIAL

## 2022-06-29 DIAGNOSIS — R11.11 VOMITING WITHOUT NAUSEA, UNSPECIFIED VOMITING TYPE: ICD-10-CM

## 2022-06-29 RX ORDER — ONDANSETRON 4 MG/1
TABLET, ORALLY DISINTEGRATING ORAL
Qty: 40 TABLET | Refills: 0 | Status: SHIPPED | OUTPATIENT
Start: 2022-06-29

## 2022-06-29 NOTE — TELEPHONE ENCOUNTER
[address] [facility] and informed pantoprazole needs prior auth  Informed pt's nephew medication needs prior auth and office will reach out once prior John Mcfarlane is completed  Nolan agreeable to [employer] and had no further questions       Clinical team: Please do John Mcfarlane

## 2022-06-30 ENCOUNTER — APPOINTMENT (OUTPATIENT)
Dept: PHYSICAL THERAPY | Facility: CLINIC | Age: 62
End: 2022-06-30
Payer: COMMERCIAL

## 2022-07-05 ENCOUNTER — OFFICE VISIT (OUTPATIENT)
Dept: PHYSICAL THERAPY | Facility: CLINIC | Age: 62
End: 2022-07-05
Payer: COMMERCIAL

## 2022-07-05 DIAGNOSIS — M21.372 FOOT DROP, LEFT: ICD-10-CM

## 2022-07-05 DIAGNOSIS — Z89.611 HX OF AKA (ABOVE KNEE AMPUTATION), RIGHT (HCC): Primary | ICD-10-CM

## 2022-07-05 PROCEDURE — 97116 GAIT TRAINING THERAPY: CPT | Performed by: PHYSICAL THERAPIST

## 2022-07-05 PROCEDURE — 97110 THERAPEUTIC EXERCISES: CPT | Performed by: PHYSICAL THERAPIST

## 2022-07-05 NOTE — PROGRESS NOTES
Daily Note     Today's date: 2022  Patient name: Elan Stewart  : 1960  MRN: [id number]  Referring provider: Mauro Patterson DO  Dx:   Encounter Diagnosis     ICD-10-CM    1  Hx of AKA (above knee amputation), right ([place] [address] )  Z89 611    2  Foot drop, left  M21 372                   Subjective: Supposed to get his new socket on Friday  Him and Israel Morillo have been sick last few days so feeling week  Objective: See treatment diary below      Assessment: Almost had a fall during WC to nustep transfer towards his R however mod A x 2 prevented fall  He needed to don and doff his prosthetic multiple times today due to discomfort  When attempting ambulation, pt was only able to ambulate a few feet prior to sitting and removed prosthetic to find small abrasion at bottom of stump  Assisted pt in cleaning and bandaging wound and recommended pt to keep prosthetic off until it is healed  Patient will be placed on hold at this time  Anticipate pt returning next week  Plan: Hold           Precautions: Fall Risk  Re-Eval: 22       Date 6/15 6/20 6/22 6/27 7/5   Visit Count 26 27 28 29 30   FOTO        Pain In        Pain Out          Manuals                                        Neuro Re-Ed         AMPRO        SLS  R SLS 10" x 2      L LE step taps 8" B UE 2 x 20 8" B UE 2 x 10  8" B UE 2 x 15     Tandem Stance        Turn negotiation        Sidestepping B UE 3 laps x 2 B UE 3 laps  B UE 3 laps x 2     Static standing        Step up                        Ther Ex        NuStep L4, 10 min  L4, 10 min  L5, 10 min  L5, 10 min  L5, 10 min    Lying prone to promote hip extension        Core Strengthening         MTP/LTP        Bicep        Tricep        STS        SLR    10 ea LE abd/flex            HEP        Ther Activity        Steps                 Gait Training        With ' x 2 with RW, 20' x 1 with RW  115' x 2 with RW, 79' x 2  140' x 1, 70' x 2 with RW  100' x 1,     between equipments 50' x 2, 10' x 1 but requested to stop due to pain             Modalities                             Access Code: [email or web address]  URL: [email or web address]  Date: 05/04/2022  Prepared by: Angel Salamanca    Exercises  · Standing Hip Abduction with Counter Support - 1 x daily - 7 x weekly - 3 sets - 10 reps  · Standing Hip Extension with Counter Support - 1 x daily - 7 x weekly - 3 sets - 10 reps  · Sidelying Hip Extension in Abduction - 1 x daily - 7 x weekly - 3 sets - 10 reps  · Side Stepping with Counter Support - 1 x daily - 7 x weekly - 3 sets - 10 reps  · Standing Toe Taps - 1 x daily - 7 x weekly - 3 sets - 10 reps

## 2022-07-07 ENCOUNTER — APPOINTMENT (OUTPATIENT)
Dept: PHYSICAL THERAPY | Facility: CLINIC | Age: 62
End: 2022-07-07
Payer: COMMERCIAL

## 2022-07-11 ENCOUNTER — TELEPHONE (OUTPATIENT)
Dept: GASTROENTEROLOGY | Facility: MEDICAL CENTER | Age: 62
End: 2022-07-11

## 2022-07-11 ENCOUNTER — APPOINTMENT (OUTPATIENT)
Dept: PHYSICAL THERAPY | Facility: CLINIC | Age: 62
End: 2022-07-11
Payer: COMMERCIAL

## 2022-07-11 NOTE — TELEPHONE ENCOUNTER
Prior Auth for PT's pantoprazole sodium 40 mg tablets was submitted though covermymeds (Key: [id number]) waiting on insurance response

## 2022-07-13 ENCOUNTER — TELEPHONE (OUTPATIENT)
Dept: GASTROENTEROLOGY | Facility: CLINIC | Age: 62
End: 2022-07-13

## 2022-07-13 ENCOUNTER — APPOINTMENT (OUTPATIENT)
Dept: PHYSICAL THERAPY | Facility: CLINIC | Age: 62
End: 2022-07-13
Payer: COMMERCIAL

## 2022-07-13 NOTE — TELEPHONE ENCOUNTER
I called patient and talked to his Nephew told him that the medication was approved, he expressed understanding  Let him know if he had any questions to call the office

## 2022-07-13 NOTE — TELEPHONE ENCOUNTER
Prior Auth for Pantoprazole sodium 40 mg tablets  was approved (Key: [id number])-covermymeds   It was approved for 30 days 7/11/22-8/11/22

## 2022-07-14 ENCOUNTER — TELEPHONE (OUTPATIENT)
Dept: FAMILY MEDICINE CLINIC | Facility: CLINIC | Age: 62
End: 2022-07-14

## 2022-07-14 NOTE — TELEPHONE ENCOUNTER
Pt relative called in to let you know pt took a home covid test and he is positive  He is not eating or drinking much  I advised him to have pt quarantine and supplement with Vit C, Vit D, and Zinc and if he is not eating/drinking/getting worse to bring him to the ER

## 2022-07-18 ENCOUNTER — APPOINTMENT (OUTPATIENT)
Dept: PHYSICAL THERAPY | Facility: CLINIC | Age: 62
End: 2022-07-18
Payer: COMMERCIAL

## 2022-07-20 ENCOUNTER — APPOINTMENT (OUTPATIENT)
Dept: PHYSICAL THERAPY | Facility: CLINIC | Age: 62
End: 2022-07-20
Payer: COMMERCIAL

## 2022-07-23 DIAGNOSIS — Z79.4 TYPE 2 DIABETES MELLITUS WITH DIABETIC POLYNEUROPATHY, WITH LONG-TERM CURRENT USE OF INSULIN (HCC): ICD-10-CM

## 2022-07-23 DIAGNOSIS — E11.42 TYPE 2 DIABETES MELLITUS WITH DIABETIC POLYNEUROPATHY, WITH LONG-TERM CURRENT USE OF INSULIN (HCC): ICD-10-CM

## 2022-07-23 RX ORDER — SITAGLIPTIN AND METFORMIN HYDROCHLORIDE 1000; 50 MG/1; MG/1
TABLET, FILM COATED ORAL
Qty: 180 TABLET | Refills: 0 | Status: SHIPPED | OUTPATIENT
Start: 2022-07-23 | End: 2022-10-25

## 2022-07-25 ENCOUNTER — OFFICE VISIT (OUTPATIENT)
Dept: PHYSICAL THERAPY | Facility: CLINIC | Age: 62
End: 2022-07-25
Payer: COMMERCIAL

## 2022-07-25 DIAGNOSIS — Z89.611 HX OF AKA (ABOVE KNEE AMPUTATION), RIGHT (HCC): Primary | ICD-10-CM

## 2022-07-25 DIAGNOSIS — M21.372 FOOT DROP, LEFT: ICD-10-CM

## 2022-07-25 PROCEDURE — 97110 THERAPEUTIC EXERCISES: CPT | Performed by: PHYSICAL THERAPIST

## 2022-07-25 PROCEDURE — 97116 GAIT TRAINING THERAPY: CPT | Performed by: PHYSICAL THERAPIST

## 2022-07-25 NOTE — PROGRESS NOTES
Daily Note     Today's date: 2022  Patient name: Treasure Bustamante  : 1960  MRN: [id number]  Referring provider: Felicia Viera DO  Dx:   Encounter Diagnosis     ICD-10-CM    1  Hx of AKA (above knee amputation), right ([place] [address] )  Z89 611    2  Foot drop, left  M21 372                    Subjective: New AFO fit good and new socket is better however inside is rubbing against his groin  Will follow up with Omid Albright about it  Thinks he may have tweaked his R quad a few weeks ago when transferring [facility]  It still hurts at R anterior mid quad but only when he hits it like when getting on and off toilet  No redness or abrasions observed  Objective: See treatment diary below      Assessment: Pt presented to session with new socket for prosthesis and new L AFO  Reported fit is better for both but medial ridge of socket is rubbing and hurts in the groin area  After attempting gait training, pt deferred any further due to pain and removed prosthesis  Will f/u with [employer] to adjust and he finished on Nustep for cardio         Plan: Hold        Precautions: Fall Risk  Re-Eval: 22       Date    Visit Count    29 30   FOTO        Pain In        Pain Out          Manuals                                        Neuro Re-Ed  With L AFO and prosthesis        AMPRO        SLS        L LE step taps        Tandem Stance        Turn negotiation        Sidestepping        Static standing        Step up                        Ther Ex        NuStep L4, 14 min, no prosthetic     Assisted donning on/off prosthetic throughout the session   L5, 10 min  L5, 10 min    Lying prone to promote hip extension        Core Strengthening         MTP/LTP        Bicep        Tricep        STS        SLR    10 ea LE abd/flex            HEP        Ther Activity        Steps                 Gait Training        With RW 30' x 2 with RW    100' x 1,     between equipments 50' x 2, 10' x 1 but requested to stop due to pain             Modalities                             Access Code: [email or web address]  URL: [email or web address]  Date: 05/04/2022  Prepared by: Ron Alicia    Exercises  · Standing Hip Abduction with Counter Support - 1 x daily - 7 x weekly - 3 sets - 10 reps  · Standing Hip Extension with Counter Support - 1 x daily - 7 x weekly - 3 sets - 10 reps  · Sidelying Hip Extension in Abduction - 1 x daily - 7 x weekly - 3 sets - 10 reps  · Side Stepping with Counter Support - 1 x daily - 7 x weekly - 3 sets - 10 reps  · Standing Toe Taps - 1 x daily - 7 x weekly - 3 sets - 10 reps

## 2022-07-27 ENCOUNTER — OFFICE VISIT (OUTPATIENT)
Dept: VASCULAR SURGERY | Facility: CLINIC | Age: 62
End: 2022-07-27
Payer: COMMERCIAL

## 2022-07-27 ENCOUNTER — APPOINTMENT (OUTPATIENT)
Dept: PHYSICAL THERAPY | Facility: CLINIC | Age: 62
End: 2022-07-27
Payer: COMMERCIAL

## 2022-07-27 VITALS
HEART RATE: 92 BPM | HEIGHT: 68 IN | DIASTOLIC BLOOD PRESSURE: 82 MMHG | SYSTOLIC BLOOD PRESSURE: 140 MMHG | BODY MASS INDEX: 24.33 KG/M2

## 2022-07-27 DIAGNOSIS — I73.9 PAD (PERIPHERAL ARTERY DISEASE) (HCC): Primary | ICD-10-CM

## 2022-07-27 PROCEDURE — 3077F SYST BP >= 140 MM HG: CPT | Performed by: NURSE PRACTITIONER

## 2022-07-27 PROCEDURE — 3079F DIAST BP 80-89 MM HG: CPT | Performed by: NURSE PRACTITIONER

## 2022-07-27 PROCEDURE — 99213 OFFICE O/P EST LOW 20 MIN: CPT | Performed by: NURSE PRACTITIONER

## 2022-07-27 NOTE — PATIENT INSTRUCTIONS
Lower extremity arterial duplex in 6 months with return office visit   Continue Eliquis  Continue statin (Lipitor)   Recommend initiating aspirin 81 mg daily  Discuss with PCP  Call or return to office sooner with new or worsening claudication pain, rest pain or wounds as discussed in office today  Call with any questions or concerns    Peripheral Artery Disease   AMBULATORY CARE:   Peripheral artery disease (PAD)  is narrow, weak, or blocked arteries  It may affect any arteries outside of your heart and brain  PAD is usually the result of a buildup of fat and cholesterol, also called plaque, along your artery walls  Inflammation, a blood clot, or abnormal cell growth could also block your arteries  PAD prevents normal blood flow to your legs and arms  You are at risk of an amputation if poor blood flow keeps wounds from healing or causes gangrene (tissue death)  Without treatment, PAD can also cause a heart attack or stroke  Common symptoms include:  Mild PAD usually does not cause symptoms   As the disease worsens over time, you may have the following:  Pain or cramps in your leg or hip while you walk    A numb, weak, or heavy feeling in your legs    Dry, scaly, red, or pale skin on your legs    Thick or brittle nails, or hair loss on your arms and legs    Foot sores that will not heal    Burning or aching in your feet and toes while resting (this may be worse when you lie down)    Call your local emergency number (911 in the [address] [place] [address],[address]) if:   You have any of the following signs of a heart attack:      Squeezing, pressure, or pain in your chest    You may  also have any of the following:     Discomfort or pain in your back, neck, jaw, stomach, or arm    Shortness of breath    Nausea or vomiting    Lightheadedness or a sudden cold sweat    You have any of the following signs of a stroke:      Numbness or drooping on one side of your face     Weakness in an arm or leg    Confusion or difficulty speaking    Dizziness, a severe headache, or vision loss    Seek care immediately if:   You have sores or wounds that will not heal     You notice black or discolored skin on your arm or leg  Your skin is cool to the touch  Call your doctor if:   You have leg pain when you walk 1/8 mile (200 meters) or less, even with treatment  Your legs are red, dry, or pale, even with treatment  You have questions or concerns about your condition or care  Treatment for PAD  can help reduce your risk of a heart attack, stroke, or amputation  You may need more than one of the following:  Medicines  may be given to prevent blood clots and reduce the risk of a heart attack or stroke  You may be given medicine to help prevent your PAD from getting worse  A supervised exercise program  helps you stay active in normal daily activities  Healthcare providers will help you safely walk or do strength training exercises 3 times a week for 30 to 60 minutes  You will do this for several months, then transition to walking on your own  Angioplasty  is a procedure to open your artery so blood can flow through normally  A thin tube called a catheter is used to insert a small balloon into your artery  The balloon is inflated to open your blocked artery, and then removed  A tube called a stent may be placed in your artery to hold it open  Bypass surgery  is used to make a new connection to your artery with a vein from another part of your body, or an artificial graft  The vein or graft is attached to your artery above and below your blockage  This allows blood to flow around the blocked portion of your artery  Manage and prevent PAD:   Walk for 30 to 60 minutes at least 4 times a week  Your healthcare provider may also refer you to a supervised exercise program  The program helps increase how far you can walk without pain  It also helps you stay active in normal daily activities         Do not smoke    Nicotine and other chemicals in cigarettes and cigars can worsen PAD  They can also increase your risk for a heart attack or stroke  Ask your healthcare provider for information if you currently smoke and need help to quit  E-cigarettes or smokeless tobacco still contain nicotine  Talk to your healthcare provider before you use these products  Manage other health conditions  Take your medicines as directed and follow your healthcare provider's instructions if you have high blood pressure or high cholesterol  Perform foot care and check your blood sugar levels as directed if you have diabetes  Eat heart-healthy foods  Eat whole grains, fruits, and vegetables every day  Limit salt and high-fat foods  Ask your healthcare provider for more information on a heart healthy diet  Ask what a healthy weight is for you  Your healthcare provider can help you create a healthy weight-loss plan, if needed  Follow up with your doctor as directed:  Write down your questions so you remember to ask them during your visits  © Copyright [employer] 2022 Information is for End User's use only and may not be sold, redistributed or otherwise used for commercial purposes  All illustrations and images included in CareNotes® are the copyrighted property of A D A M , Inc  or [address] [place], [place] [address]jaswinder   The above information is an  only  It is not intended as medical advice for individual conditions or treatments  Talk to your doctor, nurse or pharmacist before following any medical regimen to see if it is safe and effective for you

## 2022-07-27 NOTE — PROGRESS NOTES
Assessment/Plan:    PAD (peripheral artery disease) ([facility] [address] )  64-year-old male, former smoker with HTN, HLD, dm type 2, GERD, chronic hepatitis C, methadone use, COVID-19 pneumonia January 2021 c/b acute respiratory failure requiring intubation, fungal empyema,  acute nonocclusive R FV DVT and PE 1/12/21 and acute critical RLE limb ischemia 2/2 aortic thrombus, s/p R SFA/PFA/pop/tibial embolectomy by Dr Donny Springer 1/13/21 followed by R victorinalotine amputation 1/14/21 and formalization of R AKA by Dr Bg Rutledge 1/18/21, and L foot drop w/ heel woud presents to review recent imaging     - patient is accompanied by belkys Bob  - patient complains of LLE calf cramping during PT about 30 minutes into session  He denies rest pain  - No wounds, left heel wound closed (wears left foot brace)   - LEAD 6/27/22  LEFT LOWER LIMB:  Diffuse disease noted throughout the femoral-popliteal arteries with a >75%  stenosis noted in the proximal femoral artery  There is a new >75% stenosis  noted in the distal popliteal and tibio-peroneal trunk  An occlusion is now seen  in the posterior tibial artery and there is trickle flow in the peroneal artery  ABIx:  0 91/39/58  - hemoglobin A1c 8 1    Plan:  -Lower extremity arterial duplex in 6 months with return office visit   -Continue Eliquis  -Continue statin (Lipitor)   -Recommend initiating aspirin 81 mg daily  Discussed with PCP  - stump  and prostatic to R AKA  - continue PT  -Call or return to office sooner with new or worsening claudication pain, rest pain or wounds as discussed in office today  -Call with any questions or concerns       Diagnoses and all orders for this visit:    PAD (peripheral artery disease) ([facility])  -     VAS lower limb arterial duplex, limited/unilateral; Future          Subjective:      Patient ID: Hank Bowman is a 57 y o  male  Patient w/ h/o R AKA (1/18/21) presents today to review MEGGAN done 6/27   Patient admits to L leg calf cramping w/ activity and also at rest  No wounds  HPI  See assessment and plan     40-year-old male, former smoker with HTN, HLD, dm type 2 and COVID-19 infection with complicated hospital course ultimately requiring R AKA presents to review recent imaging  Patient overall is doing well although admits to experiencing increased cramping/claudication symptoms in left leg while at PT  He denies rest pain  Patient has no wounds, left heel wound is now completely closed  He does wear left foot brace  Patient is working with prosthesis at PT to R AKA  Patient is currently maintained on Eliquis and Lipitor  We reviewed imaging which demonstrates progression of disease in left distal popliteal and left PT occlusion  His CHATO is 0 91/39/59  At this time no intervention is indicated, we discussed close surveillance and monitoring symptoms  Advised patient if pain were to worsen or develop to rest pain or patient developed wounds he should call and return to the office immediately  Patient and nephew verbalized understanding  Also discussed initiation of daily 81 mg aspirin  The following portions of the patient's history were reviewed and updated as appropriate: allergies, current medications, past family history, past medical history, past social history, past surgical history and problem list     Review of Systems   Constitutional: Negative  HENT: Negative  Eyes: Negative  Respiratory: Negative  Cardiovascular: Negative  Gastrointestinal: Positive for nausea  Endocrine: Negative  Genitourinary: Negative  Musculoskeletal: Positive for arthralgias and gait problem  Leg pain   Skin: Negative  Allergic/Immunologic: Negative  Hematological: Negative  Psychiatric/Behavioral: Negative  I have reviewed and made appropriate changes to the review of systems input by the medical assistant        Objective:      /82 (BP Location: Left arm, Patient Position: Sitting)   Pulse 92 Ht 5' 8" (1 727 m)   BMI 24 33 kg/m²          Physical Exam  Constitutional:       General: He is not in acute distress  Appearance: Normal appearance  HENT:      Head: Normocephalic and atraumatic  Cardiovascular:      Rate and Rhythm: Normal rate and regular rhythm  Pulses:           Radial pulses are 2+ on the left side  Dorsalis pedis pulses are detected w/ Doppler on the left side  Posterior tibial pulses are 0 on the left side  Pulmonary:      Effort: Pulmonary effort is normal  No respiratory distress  Breath sounds: Normal breath sounds  No wheezing  Musculoskeletal:         General: No swelling  Comments: L foot brace  R AKA      Right Lower Extremity: Right leg is amputated above knee  Skin:     Capillary Refill: Capillary refill takes less than 2 seconds  Neurological:      Mental Status: He is alert and oriented to person, place, and time             Vitals:    07/27/22 1049   BP: 140/82   BP Location: Left arm   Patient Position: Sitting   Pulse: 92   Height: 5' 8" (1 727 m)       Patient Active Problem List   Diagnosis    Chronic hepatitis C without hepatic coma ([place] [address] )    Essential hypertension    Hyperlipidemia associated with type 2 diabetes mellitus ([facility] )    Methadone maintenance therapy patient ([place] [address] )    Type 2 diabetes mellitus with diabetic polyneuropathy ([facility] )    Diabetic ulcer of left heel associated with type 2 diabetes mellitus, limited to breakdown of skin (HCC)    Bilateral lower extremity edema    Positive depression screening    Mononeuropathy, unspecified    Mixed hyperlipidemia    Proteinuria    Vitamin D deficiency    Type 2 diabetes mellitus, with long-term current use of insulin (HCC)    Acute respiratory failure with hypoxia (HCC)    Hematuria    Aortic thrombus (HCC)    Prolonged Q-T interval on ECG    Empyema lung (HCC)    S/P AKA (above knee amputation), right (HCC)    Cervical radiculopathy    Chronic bilateral low back pain    Above-knee amputation of right lower extremity (HCC)    Drug-induced constipation    Moderate protein-calorie malnutrition (HCC)    Chronic pain syndrome    Chronic hyponatremia    Depression    History of COVID-19    Pulmonary fibrosis (HCC)    Foot drop, left    PAD (peripheral artery disease) (HCC)       Past Surgical History:   Procedure Laterality Date    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    Procedure: AMPUTATION ABOVE KNEE (AKA);   Surgeon: Delaney Hernandez MD;  [facility]: [facility];  Service: Vascular    AMPUTATION ABOVE KNEE (AKA) Right 1/18/2021    Procedure: AMPUTATION ABOVE KNEE (AKA) FORMALIZATION,  R AKA wound washout, wound closure;  Surgeon: Delaney Hernandez MD;  [facility];  Service: Vascular    ARTERIOGRAM Right 1/13/2021    Procedure: ARTERIOGRAM;  Surgeon: Miranda Rodriguez DO;  [facility];  Service: Vascular    IR CHEST TUBE PLACEMENT  2/10/2021    IR LOWER EXTREMITY ANGIOGRAM  1/14/2021    THROMBECTOMY W/ EMBOLECTOMY Right 1/13/2021    Procedure: EMBOLECTOMY/THROMBECTOMY LOWER EXTREMITY;  Surgeon: Miranda Rodriguez DO;  [facility];  Service: Vascular       Family History   Problem Relation Age of Onset    Diabetes Mother     Hypertension Father     Leukemia Father     Acute lymphoblastic leukemia Father     Cancer Sister        Social History     Socioeconomic History    Marital status:      Spouse name: Not on file    Number of children: Not on file    Years of education: Not on file    Highest education level: Not on file   Occupational History    Not on file   Tobacco Use    Smoking status: Former Smoker    Smokeless tobacco: Never Used   Vaping Use    Vaping Use: Never used   Substance and Sexual Activity    Alcohol use: Not Currently    Drug use: No     Comment: methadone    Sexual activity: Not on file   Other Topics Concern    Not on file   Social History Narrative    Not on file     Social Determinants of Health     Financial Resource Strain: Not on file   Food Insecurity: Not on file   Transportation Needs: No Transportation Needs    Lack of Transportation (Medical): No    Lack of Transportation (Non-Medical):  No   Physical Activity: Not on file   Stress: Not on file   Social Connections: Not on file   Intimate Partner Violence: Not on file   Housing Stability: Not on file       Allergies   Allergen Reactions    Varenicline          Current Outpatient Medications:     acetaminophen (TYLENOL) 325 mg tablet, Take 3 tablets (975 mg total) by mouth every 8 (eight) hours (Patient taking differently: Take 650 mg by mouth 3 (three) times a day as needed for moderate pain), Disp: , Rfl: 0    albuterol (PROVENTIL HFA,VENTOLIN HFA) 90 mcg/act inhaler, Inhale 2 puffs every 4 (four) hours as needed for wheezing, Disp: , Rfl: 0    atorvastatin (LIPITOR) 20 mg tablet, Take 1 tablet (20 mg total) by mouth daily, Disp: 90 tablet, Rfl: 3    BD Veo Insulin Syringe U/F 31G X 15/64" 0 5 ML MISC, USE AS DIRECTED, Disp: 100 each, Rfl: 0    BinaxNOW COVID-19 Ag Home Test KIT, Use as Directed on the Package, Disp: , Rfl:     bisacodyl (DULCOLAX) 5 mg EC tablet, Take as instructed by GI office for bowel prep for colonoscopy, Disp: 2 tablet, Rfl: 0    Blood Glucose Monitoring Suppl (OneTouch Verio) w/Device KIT, Use to test blood sugars 3 times daily, Disp: 1 kit, Rfl: 0    cholecalciferol (VITAMIN D3) 1,000 units tablet, Take 2 tablets (2,000 Units total) by mouth daily, Disp: 12 tablet, Rfl: 0    Continuous Blood Gluc  (FreeStyle Ke 2 [place]) JOSEPH, Use as directed, Disp: 1 each, Rfl: 0    Continuous Blood Gluc Sensor (FreeStyle Ke 2 Sensor) MISC, Change every 14 days, Disp: 6 each, Rfl: 1    Diclofenac Sodium (VOLTAREN) 1 %, Apply 4 g topically 4 (four) times a day (Patient taking differently: Apply 4 g topically 4 (four) times a day as needed), Disp: , Rfl: 0    DULoxetine (CYMBALTA) 30 mg delayed release capsule, Take 1 capsule (30 mg total) by mouth in the morning, Disp: 30 capsule, Rfl: 2    Eliquis 5 MG, Take 1 tablet by mouth twice daily, Disp: 180 tablet, Rfl: 2    Empagliflozin (Jardiance) 25 MG TABS, Take 1 tablet (25 mg total) by mouth every morning, Disp: 90 tablet, Rfl: 3    fluticasone (FLONASE) 50 mcg/act nasal spray, 1 spray into each nostril daily (Patient taking differently: 1 spray into each nostril if needed), Disp: 16 g, Rfl: 2    glucose blood test strip, Use 1 each 3 (three) times daily after meals Use as instructed, Disp: 270 each, Rfl: 3    insulin glargine (LANTUS) 100 units/mL subcutaneous injection, Inject 27 Units under the skin daily at bedtime, Disp: 10 mL, Rfl: 3    insulin lispro (HumaLOG KwikPen) 100 units/mL injection pen, Blood Glucose 150 - 224: 4 Unit of Insulin Blood Glucose 225 - 299: 5 Units of Insulin Blood Glucose 300 - 374: 6 Units of Insulin Blood Glucose 375 - 449: 7 Units of Insulin Blood Glucose greater than or equal to 450: 8 Units of Insulin Max of 24 units per day, Disp: 15 mL, Rfl: 3    Insulin Pen Needle 31G X 5 MM MISC, Use daily Pt to inject 4 X daily, Disp: 100 each, Rfl: 5    Insulin Pen Needle 31G X 5 MM MISC, 30 units sq 2X daily, Disp: 100 each, Rfl: 3    Janumet  MG per tablet, TAKE 1 TABLET BY MOUTH TWICE DAILY WITH MEALS, Disp: 180 tablet, Rfl: 0    levocetirizine (XYZAL) 5 MG tablet, TAKE 1 TABLET BY MOUTH ONCE DAILY IN THE EVENING, Disp: 90 tablet, Rfl: 01    lidocaine (LMX) 4 % cream, Apply topically 4 (four) times a day as needed for mild pain, Disp: 30 g, Rfl: 0    lisinopril (ZESTRIL) 10 mg tablet, Take 1 tablet by mouth once daily, Disp: 90 tablet, Rfl: 0    magnesium oxide (MAG-OX) 400 mg, Take 1 tablet (400 mg total) by mouth 2 (two) times a day, Disp: 60 tablet, Rfl: 0    menthol-methyl salicylate (BENGAY) 59-86 % cream, Apply topically 4 (four) times a day as needed (torticollis), Disp: , Rfl: 0    methadone (DOLOPHINE) 10 mg tablet, Take 70 mg by mouth daily,, Disp: , Rfl:     multivitamin-minerals (CENTRUM ADULTS) tablet, Take 1 tablet by mouth daily, Disp: , Rfl: 0    naloxone (NARCAN) 4 mg/0 1 mL nasal spray, Administer 1 spray into a nostril   If no response after 2-3 minutes, give another dose in the other nostril using a new spray , Disp: 1 each, Rfl: 1    ondansetron (ZOFRAN-ODT) 4 mg disintegrating tablet, DISSOLVE 1 TABLET IN MOUTH EVERY 6 HOURS AS NEEDED FOR NAUSEA OR VOMITING, Disp: 40 tablet, Rfl: 0    pantoprazole (PROTONIX) 40 mg tablet, Take 1 tablet (40 mg total) by mouth daily, Disp: 30 tablet, Rfl: 3    polyethylene glycol (GOLYTELY) 4000 mL solution, Take as instructed by GI office for bowel prep, Disp: 4000 mL, Rfl: 0    pregabalin (LYRICA) 25 mg capsule, Take 1 capsule (25 mg total) by mouth 3 (three) times a day, Disp: 90 capsule, Rfl: 2    silver sulfadiazine (SILVADENE,SSD) 1 % cream, Apply topically daily Apply as directed to left heel ulcer daily with each dressing change , Disp: 25 g, Rfl: 2

## 2022-07-28 NOTE — ASSESSMENT & PLAN NOTE
57-year-old male, former smoker with HTN, HLD, dm type 2, GERD, chronic hepatitis C, methadone use, COVID-19 pneumonia January 2021 c/b acute respiratory failure requiring intubation, fungal empyema,  acute nonocclusive R FV DVT and PE 1/12/21 and acute critical RLE limb ischemia 2/2 aortic thrombus, s/p R SFA/PFA/pop/tibial embolectomy by Dr Shawn Oglesby 1/13/21 followed by R guillotine amputation 1/14/21 and formalization of R AKA by Dr Alvaro Daley 1/18/21, and L foot drop w/ heel woud presents to review recent imaging     - patient is accompanied by belkys Lay  - patient complains of LLE calf cramping during PT about 30 minutes into session  He denies rest pain  - No wounds, left heel wound closed (wears left foot brace)   - LEAD 6/27/22  LEFT LOWER LIMB:  Diffuse disease noted throughout the femoral-popliteal arteries with a >75%  stenosis noted in the proximal femoral artery  There is a new >75% stenosis  noted in the distal popliteal and tibio-peroneal trunk  An occlusion is now seen  in the posterior tibial artery and there is trickle flow in the peroneal artery  ABIx:  0 91/39/58  - hemoglobin A1c 8 1    Plan:  -Lower extremity arterial duplex in 6 months with return office visit   -Continue Eliquis  -Continue statin (Lipitor)   -Recommend initiating aspirin 81 mg daily    Discussed with PCP  - stump  and prostatic to R AKA  - continue PT  -Call or return to office sooner with new or worsening claudication pain, rest pain or wounds as discussed in office today  -Call with any questions or concerns

## 2022-08-02 DIAGNOSIS — I74.10 AORTIC THROMBUS (HCC): ICD-10-CM

## 2022-08-02 RX ORDER — LISINOPRIL 10 MG/1
TABLET ORAL
Qty: 90 TABLET | Refills: 0 | Status: SHIPPED | OUTPATIENT
Start: 2022-08-02 | End: 2022-10-25

## 2022-08-04 DIAGNOSIS — Z79.4 TYPE 2 DIABETES MELLITUS WITH HYPERGLYCEMIA, WITH LONG-TERM CURRENT USE OF INSULIN (HCC): ICD-10-CM

## 2022-08-04 DIAGNOSIS — E11.65 TYPE 2 DIABETES MELLITUS WITH HYPERGLYCEMIA, WITH LONG-TERM CURRENT USE OF INSULIN (HCC): ICD-10-CM

## 2022-08-04 RX ORDER — SYRING-NEEDL,DISP,INSUL,0.3 ML 31GX15/64"
SYRINGE, EMPTY DISPOSABLE MISCELLANEOUS 3 TIMES DAILY
Qty: 300 EACH | Refills: 5 | Status: SHIPPED | OUTPATIENT
Start: 2022-08-04

## 2022-08-05 DIAGNOSIS — E11.42 TYPE 2 DIABETES MELLITUS WITH DIABETIC POLYNEUROPATHY, WITH LONG-TERM CURRENT USE OF INSULIN (HCC): ICD-10-CM

## 2022-08-05 DIAGNOSIS — Z79.4 TYPE 2 DIABETES MELLITUS WITH DIABETIC POLYNEUROPATHY, WITH LONG-TERM CURRENT USE OF INSULIN (HCC): ICD-10-CM

## 2022-08-06 NOTE — PROGRESS NOTES
Vancomycin IV Pharmacy-to-Dose Consultation    This patient's vancomycin therapy has been discontinued  Pharmacy will sign off of the vancomycin consult  Thank you       Isael Potters, PharmD, BCPP, Clinical Pharmacist Simple / Intermediate / Complex Repair - Final Wound Length In Cm: 3.7

## 2022-08-08 RX ORDER — EMPAGLIFLOZIN 25 MG/1
TABLET, FILM COATED ORAL
Qty: 30 TABLET | Refills: 0 | Status: SHIPPED | OUTPATIENT
Start: 2022-08-08 | End: 2022-09-05

## 2022-08-15 ENCOUNTER — TELEPHONE (OUTPATIENT)
Dept: GASTROENTEROLOGY | Facility: CLINIC | Age: 62
End: 2022-08-15

## 2022-08-16 ENCOUNTER — TELEPHONE (OUTPATIENT)
Dept: GASTROENTEROLOGY | Facility: CLINIC | Age: 62
End: 2022-08-16

## 2022-08-18 ENCOUNTER — TELEPHONE (OUTPATIENT)
Dept: NEUROLOGY | Facility: CLINIC | Age: 62
End: 2022-08-18

## 2022-08-22 ENCOUNTER — TELEPHONE (OUTPATIENT)
Dept: FAMILY MEDICINE CLINIC | Facility: CLINIC | Age: 62
End: 2022-08-22

## 2022-08-22 NOTE — TELEPHONE ENCOUNTER
Neurology called asking if we can have a neurology referral placed with these diagnoses codes  [phone]  R20 1    Pt's appointment date is August 24th 2022

## 2022-08-23 DIAGNOSIS — Z89.611 STATUS POST BILATERAL ABOVE KNEE AMPUTATION (HCC): Primary | ICD-10-CM

## 2022-08-23 DIAGNOSIS — M21.372 LEFT FOOT DROP: ICD-10-CM

## 2022-08-23 DIAGNOSIS — Z89.612 STATUS POST BILATERAL ABOVE KNEE AMPUTATION (HCC): Primary | ICD-10-CM

## 2022-08-23 DIAGNOSIS — R20.1 HYPOESTHESIA: ICD-10-CM

## 2022-08-24 ENCOUNTER — OFFICE VISIT (OUTPATIENT)
Dept: NEUROLOGY | Facility: CLINIC | Age: 62
End: 2022-08-24
Payer: COMMERCIAL

## 2022-08-24 VITALS
DIASTOLIC BLOOD PRESSURE: 68 MMHG | HEIGHT: 68 IN | TEMPERATURE: 97.6 F | BODY MASS INDEX: 24.25 KG/M2 | WEIGHT: 160 LBS | HEART RATE: 90 BPM | OXYGEN SATURATION: 98 % | SYSTOLIC BLOOD PRESSURE: 120 MMHG

## 2022-08-24 DIAGNOSIS — G54.6 PHANTOM LIMB SYNDROME WITH PAIN (HCC): ICD-10-CM

## 2022-08-24 DIAGNOSIS — M21.372 LEFT FOOT DROP: ICD-10-CM

## 2022-08-24 DIAGNOSIS — Z89.611 S/P AKA (ABOVE KNEE AMPUTATION), RIGHT (HCC): Primary | ICD-10-CM

## 2022-08-24 DIAGNOSIS — Z89.611 STATUS POST BILATERAL ABOVE KNEE AMPUTATION (HCC): ICD-10-CM

## 2022-08-24 DIAGNOSIS — Z89.612 STATUS POST BILATERAL ABOVE KNEE AMPUTATION (HCC): ICD-10-CM

## 2022-08-24 DIAGNOSIS — R20.1 HYPOESTHESIA: ICD-10-CM

## 2022-08-24 PROCEDURE — 99214 OFFICE O/P EST MOD 30 MIN: CPT | Performed by: PHYSICAL MEDICINE & REHABILITATION

## 2022-08-24 RX ORDER — ASPIRIN 81 MG/1
81 TABLET, CHEWABLE ORAL DAILY
COMMUNITY

## 2022-08-24 NOTE — PATIENT INSTRUCTIONS
Outpatient physical therapy highly recommended  Patient has just received a new AFO for the left foot and ankle for his known chronic left footdrop, new prosthesis for the right residual limb with improved fit  Is in need for outpatient prosthetic training  If insurance does not approve you for outpatient therapy this year 2022, then may consider in house Physical therapy to begin prosthetic training with your new prosthesis over the next 4 months to not delay your progress    Call office [phone] if insurance does not approve you for outpatient PT

## 2022-08-24 NOTE — PROGRESS NOTES
[facility] Medicine [facility]  Follow-up Patient Evaluation  Emelia Nyhan 57 y o  male      HPI/SUBJECTIVE: Emelia Nyhan 57 y o  male right handed, with  has a past medical history of Arthritis, Diabetes mellitus ([facility] ), GERD (gastroesophageal reflux disease), Hypercholesteremia, Hypertension, Ischemia of right lower extremity with suspected rhabdomyolysis (2/6/2021), Left Hydropneumothorax (2/10/2021), Methadone use, Pneumonia due to COVID-19 virus (1/11/2021), and Subacute osteomyelitis of right femur ([facility] ) (9/13/2021)  Old records were reviewed personally  Patient is s/p right transfemoral amputation on 1/14/21 by Dr Hunter Philip secondary to ischemic limb as complication with COVID - 19  Patient also has a chronic left foot drop  Patient seen face to face today in limb loss clinic  He is accompanied by his nephew today  Presents in a wheelchair  Patient did not bring his new prosthesis today  He is wearing his new AFO which does not contact his skin and is built into his sneaker       Patient is working with [employer] with K[employer] level initial prosthesis    Presents with his nephew today  Last seen 4/20/22  Received a new definitive prosthesis and a new AFO  Had some complications with fit and pressure/brusing at inguinal crease  Had adjustments made by Jill Crandall       He is wearing his prosthesis in the house for a few hours  Walking with his prosthesis short distances in the house with his nephew and a rolling walker  Patient has sustained no falls  He is currently struggling with getting back into outpatient therapy due to insurance issues  Incision:   Residual limb pain:  Bony pain when he flexes his hip after traumatic fall  Phantom limb sensations or pain: yes at night  Weaning off Lyrica by his pain management physician  Still taking 1 Lyrica at night      Health of contralateral limb: Patient with neuropathy of left foot and healing open areas on the left foot  Function:  Prior level of function:  Prior to amputation patient was independent and fairly active with all mobility and self care   Current level of function: Patient usually in wheelchair and requires assistance with transfers Min A, toilet transfers, and set-up and Min A for his ADLs including donning and doffing his prosthesis  His nephew provides him assistance  Patient ambulates with his prosthesis only with therapies at this time  Goals: Get to the shower, go out to eat, walk with prosthesis and visit other family with prosthesis  Barriers: left foot drop  Motivation/family support:  Has strong desire and motivation to ambulate with a prosthesis    Medically patient overall stable  Really likes his new AFO  Hoping his new prosthesis fit will assist him in walking and meeting his goals eventually  Mood/Cognition: good/fair cognition      ASSESSMENT/PLAN:       Diagnoses and all orders for this visit:    S/P AKA (above knee amputation), right ([facility] [address] )  -     Ambulatory Referral to Physical Therapy; Future    Status post bilateral above knee amputation [facility])  -     Ambulatory Referral to Neurology    Left foot drop  -     Ambulatory Referral to Neurology  -     Ambulatory Referral to Physical Therapy; Future    Hypoesthesia  -     Ambulatory Referral to Neurology    Phantom limb syndrome with pain [facility])  -     Ambulatory Referral to Physical Therapy; Future      R AKA  · Outpatient physical therapy highly recommended  Patient has just received a new AFO for the left foot and ankle for his known chronic left footdrop, new prosthesis for the right residual limb with improved fit  Is in need for outpatient prosthetic training  If insurance does approve for outpatient therapy this year 2022, then may consider home care -  in house PT to begin prosthetic training with your new prosthesis over the next 4 months to not delay your progress      L foot drop  · New AFO (double upright) with excellent fit       *I have spent 35 minutes with Patient and family today in which greater than 50% of this time was spent in counseling/coordination of care regarding Patient and family education and Impressions  Expanded Social History/Living Situation:   Patient lives with family member, sister in a multilevel home with 0 steps to enter  Driving: No      Review of Systems   Constitutional: Negative  HENT: Negative  Eyes: Negative  Respiratory: Negative  Cardiovascular: Negative  Gastrointestinal: Negative  Endocrine: Negative  Genitourinary: Negative  Musculoskeletal: Negative  Skin: Negative  Allergic/Immunologic: Negative  Neurological: Negative  Hematological: Negative  Psychiatric/Behavioral: Negative  ROS personally reviewed and updated    OBJECTIVE:   /68 (BP Location: Left arm, Patient Position: Sitting)   Pulse 90   Temp 97 6 °F (36 4 °C) (Temporal)   Ht 5' 8" (1 727 m)   Wt 72 6 kg (160 lb)   SpO2 98%   BMI 24 33 kg/m²        Physical Exam  Vitals and nursing note reviewed  Constitutional:       General: He is not in acute distress  Appearance: He is well-developed  HENT:      Head: Normocephalic  Nose: Nose normal    Eyes:      Conjunctiva/sclera: Conjunctivae normal    Cardiovascular:      Rate and Rhythm: Normal rate and regular rhythm  Pulses: Normal pulses  Pulmonary:      Effort: Pulmonary effort is normal       Breath sounds: No wheezing  Abdominal:      General: There is no distension  Palpations: Abdomen is soft  Musculoskeletal:         General: Normal range of motion  Cervical back: Neck supple  Comments: Residual limb:  Edema: None  Skin mobility: good  Palpation:  Some provocation of pain on anterior thigh near mid femur, no areas of swelling or erythema  No evidence of hip contracture  Incision well healed   Skin:     General: Skin is warm     Neurological:      Mental Status: He is alert and oriented to person, place, and time        Comments: Strength in BUE 4/5 throughout  R residual limb 4/5  Left le/5 HF, KE KF, 2/5 DF, EHL, 3/5 PF  Sensation to light touch IMPAIRED   Psychiatric:         Mood and Affect: Mood normal          Imaging: I personally reviewed pertinent imaging    Labs: Personally reviewed  Lab Results   Component Value Date    WBC 6 41 2021    HGB 13 3 2021    HCT 42 8 2021    MCV 86 2021     2021     Lab Results   Component Value Date    GLUCOSE 179 (H) 2021    CALCIUM 10 0 2022    K 5 1 2022    CO2 30 2022     2022    BUN 30 (H) 2022    CREATININE 1 14 2022         The following portions of the patient's history were reviewed and updated as appropriate: allergies, current medications, past family history, past medical history, past social history, past surgical history and problem list     Past Medical History:   Diagnosis Date    Arthritis     Diabetes mellitus ([facility] )     GERD (gastroesophageal reflux disease)     Hypercholesteremia     Hypertension     Ischemia of right lower extremity with suspected rhabdomyolysis 2021    Left Hydropneumothorax 2/10/2021    Methadone use     Pneumonia due to COVID-19 virus 2021    Subacute osteomyelitis of right femur ([facility] ) 2021       Patient Active Problem List    Diagnosis Date Noted    PAD (peripheral artery disease) ([place] [id number] ) 2022    Foot drop, left 2021    History of COVID-19 2021    Pulmonary fibrosis ([facility] ) 2021    Depression 2021    Chronic hyponatremia 2021    Chronic pain syndrome 2021    Above-knee amputation of right lower extremity ([facility] ) 2021    Cervical radiculopathy 2021    Chronic bilateral low back pain 2021    Moderate protein-calorie malnutrition [facility] ) 2021    S/P AKA (above knee amputation), right ([facility] ) 2021    Drug-induced constipation 03/08/2021    Empyema lung ([facility]) 03/01/2021    Prolonged Q-T interval on ECG 02/17/2021    Aortic thrombus ([facility] ) 01/13/2021    Type 2 diabetes mellitus, with long-term current use of insulin ([facility] ) 01/11/2021    Acute respiratory failure with hypoxia ([place] [address] ) 01/11/2021    Hematuria 01/11/2021    Vitamin D deficiency 12/05/2019    Mononeuropathy, unspecified 11/07/2019    Mixed hyperlipidemia 11/07/2019    Proteinuria 11/07/2019    Positive depression screening 08/08/2019    Bilateral lower extremity edema 11/27/2018    Diabetic ulcer of left heel associated with type 2 diabetes mellitus, limited to breakdown of skin ([place] [address] ) 10/30/2018    Essential hypertension 04/13/2017    Hyperlipidemia associated with type 2 diabetes mellitus ([place] [address] ) 04/13/2017    Methadone maintenance therapy patient ([place] [address] ) 04/13/2017    Type 2 diabetes mellitus with diabetic polyneuropathy ([place] [address] ) 04/13/2017    Chronic hepatitis C without hepatic coma ([place] [address] ) 02/27/2017       Past Surgical History:   Procedure Laterality Date    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    Procedure: AMPUTATION ABOVE KNEE (AKA);   Surgeon: Ida Mock MD;  [facility]: [facility];  Service: Vascular    AMPUTATION ABOVE KNEE (AKA) Right 1/18/2021    Procedure: AMPUTATION ABOVE KNEE (AKA) FORMALIZATION,  R AKA wound washout, wound closure;  Surgeon: Ida Mock MD;  [facility];  Service: Vascular    ARTERIOGRAM Right 1/13/2021    Procedure: ARTERIOGRAM;  Surgeon: Santy Mccabe DO;  [facility];  Service: Vascular    IR CHEST TUBE PLACEMENT  2/10/2021    IR LOWER EXTREMITY ANGIOGRAM  1/14/2021    THROMBECTOMY W/ EMBOLECTOMY Right 1/13/2021    Procedure: EMBOLECTOMY/THROMBECTOMY LOWER EXTREMITY;  Surgeon: Santy Mccabe DO;  [facility];  Service: Vascular       Family History   Problem Relation Age of Onset    Diabetes Mother     Hypertension Father     Leukemia Father     Acute lymphoblastic leukemia Father  Cancer Sister        Social History     Allergies   Allergen Reactions    Varenicline          Current Outpatient Medications:     acetaminophen (TYLENOL) 325 mg tablet, Take 3 tablets (975 mg total) by mouth every 8 (eight) hours (Patient taking differently: Take 650 mg by mouth 3 (three) times a day as needed for moderate pain), Disp: , Rfl: 0    albuterol (PROVENTIL HFA,VENTOLIN HFA) 90 mcg/act inhaler, Inhale 2 puffs every 4 (four) hours as needed for wheezing, Disp: , Rfl: 0    aspirin 81 mg chewable tablet, Chew 81 mg daily, Disp: , Rfl:     atorvastatin (LIPITOR) 20 mg tablet, Take 1 tablet (20 mg total) by mouth daily, Disp: 90 tablet, Rfl: 3    bisacodyl (DULCOLAX) 5 mg EC tablet, Take as instructed by GI office for bowel prep for colonoscopy, Disp: 2 tablet, Rfl: 0    Blood Glucose Monitoring Suppl (OneTouch Verio) w/Device KIT, Use to test blood sugars 3 times daily, Disp: 1 kit, Rfl: 0    cholecalciferol (VITAMIN D3) 1,000 units tablet, Take 2 tablets (2,000 Units total) by mouth daily, Disp: 12 tablet, Rfl: 0    Continuous Blood Gluc  (FreeStyle Ke 2 Lavon) JOSEPH, Use as directed, Disp: 1 each, Rfl: 0    Continuous Blood Gluc Sensor (FreeStyle Ke 2 Sensor) MISC, Change every 14 days, Disp: 6 each, Rfl: 1    Diclofenac Sodium (VOLTAREN) 1 %, Apply 4 g topically 4 (four) times a day (Patient taking differently: Apply 4 g topically 4 (four) times a day as needed), Disp: , Rfl: 0    DULoxetine (CYMBALTA) 30 mg delayed release capsule, Take 1 capsule (30 mg total) by mouth in the morning, Disp: 30 capsule, Rfl: 2    Eliquis 5 MG, Take 1 tablet by mouth twice daily, Disp: 180 tablet, Rfl: 2    fluticasone (FLONASE) 50 mcg/act nasal spray, 1 spray into each nostril daily (Patient taking differently: 1 spray into each nostril if needed), Disp: 16 g, Rfl: 2    glucose blood test strip, Use 1 each 3 (three) times daily after meals Use as instructed, Disp: 270 each, Rfl: 3   insulin glargine (LANTUS) 100 units/mL subcutaneous injection, Inject 27 Units under the skin daily at bedtime, Disp: 10 mL, Rfl: 3    insulin lispro (HumaLOG KwikPen) 100 units/mL injection pen, Blood Glucose 150 - 224: 4 Unit of Insulin Blood Glucose 225 - 299: 5 Units of Insulin Blood Glucose 300 - 374: 6 Units of Insulin Blood Glucose 375 - 449: 7 Units of Insulin Blood Glucose greater than or equal to 450: 8 Units of Insulin Max of 24 units per day, Disp: 15 mL, Rfl: 3    Insulin Pen Needle 31G X 5 MM MISC, Use daily Pt to inject 4 X daily, Disp: 100 each, Rfl: 5    Insulin Pen Needle 31G X 5 MM MISC, 30 units sq 2X daily, Disp: 100 each, Rfl: 3    Insulin Syringe-Needle U-100 (BD Veo Insulin Syringe U/F) 31G X 15/64" 0 5 ML MISC, Inject under the skin 3 (three) times a day Use as directed, Disp: 300 each, Rfl: 5    Janumet  MG per tablet, TAKE 1 TABLET BY MOUTH TWICE DAILY WITH MEALS, Disp: 180 tablet, Rfl: 0    Jardiance 25 MG TABS, TAKE 1 TABLET BY MOUTH ONCE DAILY IN THE MORNING, Disp: 30 tablet, Rfl: 0    levocetirizine (XYZAL) 5 MG tablet, TAKE 1 TABLET BY MOUTH ONCE DAILY IN THE EVENING, Disp: 90 tablet, Rfl: 01    lidocaine (LMX) 4 % cream, Apply topically 4 (four) times a day as needed for mild pain, Disp: 30 g, Rfl: 0    lisinopril (ZESTRIL) 10 mg tablet, Take 1 tablet by mouth once daily, Disp: 90 tablet, Rfl: 0    magnesium oxide (MAG-OX) 400 mg, Take 1 tablet (400 mg total) by mouth 2 (two) times a day, Disp: 60 tablet, Rfl: 0    menthol-methyl salicylate (BENGAY) 27-25 % cream, Apply topically 4 (four) times a day as needed (torticollis), Disp: , Rfl: 0    methadone (DOLOPHINE) 10 mg tablet, Take 70 mg by mouth daily,, Disp: , Rfl:     multivitamin-minerals (CENTRUM ADULTS) tablet, Take 1 tablet by mouth daily, Disp: , Rfl: 0    naloxone (NARCAN) 4 mg/0 1 mL nasal spray, Administer 1 spray into a nostril   If no response after 2-3 minutes, give another dose in the other nostril using a new spray , Disp: 1 each, Rfl: 1    ondansetron (ZOFRAN-ODT) 4 mg disintegrating tablet, DISSOLVE 1 TABLET IN MOUTH EVERY 6 HOURS AS NEEDED FOR NAUSEA OR VOMITING, Disp: 40 tablet, Rfl: 0    pantoprazole (PROTONIX) 40 mg tablet, Take 1 tablet (40 mg total) by mouth daily, Disp: 30 tablet, Rfl: 3    polyethylene glycol (GOLYTELY) 4000 mL solution, Take as instructed by GI office for bowel prep, Disp: 4000 mL, Rfl: 0    pregabalin (LYRICA) 25 mg capsule, Take 1 capsule (25 mg total) by mouth 3 (three) times a day, Disp: 90 capsule, Rfl: 2    silver sulfadiazine (SILVADENE,SSD) 1 % cream, Apply topically daily Apply as directed to left heel ulcer daily with each dressing change , Disp: 25 g, Rfl: 2    BinaxNOW COVID-19 Ag Home Test KIT, Use as Directed on the Package (Patient not taking: Reported on 8/24/2022), Disp: , Rfl:

## 2022-08-26 ENCOUNTER — OFFICE VISIT (OUTPATIENT)
Dept: PAIN MEDICINE | Facility: CLINIC | Age: 62
End: 2022-08-26
Payer: COMMERCIAL

## 2022-08-26 VITALS
HEART RATE: 92 BPM | BODY MASS INDEX: 24.33 KG/M2 | HEIGHT: 68 IN | SYSTOLIC BLOOD PRESSURE: 147 MMHG | DIASTOLIC BLOOD PRESSURE: 79 MMHG

## 2022-08-26 DIAGNOSIS — S78.111A ABOVE-KNEE AMPUTATION OF RIGHT LOWER EXTREMITY (HCC): ICD-10-CM

## 2022-08-26 DIAGNOSIS — Z79.4 TYPE 2 DIABETES MELLITUS WITH DIABETIC POLYNEUROPATHY, WITH LONG-TERM CURRENT USE OF INSULIN (HCC): ICD-10-CM

## 2022-08-26 DIAGNOSIS — G89.4 CHRONIC PAIN SYNDROME: ICD-10-CM

## 2022-08-26 DIAGNOSIS — E11.42 TYPE 2 DIABETES MELLITUS WITH DIABETIC POLYNEUROPATHY, WITH LONG-TERM CURRENT USE OF INSULIN (HCC): ICD-10-CM

## 2022-08-26 PROCEDURE — 3078F DIAST BP <80 MM HG: CPT | Performed by: NURSE PRACTITIONER

## 2022-08-26 PROCEDURE — 3077F SYST BP >= 140 MM HG: CPT | Performed by: NURSE PRACTITIONER

## 2022-08-26 PROCEDURE — 99214 OFFICE O/P EST MOD 30 MIN: CPT | Performed by: NURSE PRACTITIONER

## 2022-08-26 RX ORDER — DULOXETIN HYDROCHLORIDE 60 MG/1
60 CAPSULE, DELAYED RELEASE ORAL DAILY
Qty: 30 CAPSULE | Refills: 1 | Status: SHIPPED | OUTPATIENT
Start: 2022-08-26 | End: 2022-10-26 | Stop reason: SDUPTHER

## 2022-08-26 NOTE — PROGRESS NOTES
Assessment:  1  Chronic pain syndrome    2  Above-knee amputation of right lower extremity ([facility] )    3  Type 2 diabetes mellitus with diabetic polyneuropathy, with long-term current use of insulin ([facility] )        Plan:  While the patient was in the office today, I did have a thorough conversation regarding their chronic pain syndrome, medication management, and treatment plan options  Patient is being seen for follow-up visit  He was last seen here on 06/24/2022 at which time Cymbalta was restarted at 30 mg at bedtime  Patient tells me that his pain is about 70% improved between the use of Lyrica and Cymbalta  Patient is being treated at a methadone clinic  Per their request, we have been slowly weaning the Lyrica  She tells me that he is down to 25 mg once daily  I advised him that he can take 25 mg once every other day for 2 weeks and then discontinue it  Will increase Cymbalta to 60 mg at bedtime  Prescription was sent to his pharmacy with refills  The patient will follow-up in 2 weeks for medication prescription refill and reevaluation  The patient was advised to contact the office should their symptoms worsen in the interim  The patient was agreeable and verbalized an understanding  History of Present Illness: The patient is a 57 y o  male who presents for a follow up office visit in regards to Foot Pain, Leg Pain, and Knee Pain  The patients current symptoms include complaints of right stump pain  Current pain level is a 4/10  Quality pain is described as sharp, cramping, shooting  Current pain medications includes:  Lyrica 25 mg once daily, Cymbalta 30 mg daily    The patient reports that this regimen is providing up to 70 % pain relief  The patient is reporting no side effects from this pain medication regimen      I have personally reviewed and/or updated the patient's past medical history, past surgical history, family history, social history, current medications, allergies, and vital signs today  Review of Systems  Review of Systems   Constitutional: Negative for chills and fever  HENT: Negative for ear pain and sore throat  Eyes: Negative for pain and visual disturbance  Respiratory: Negative for cough and shortness of breath  Cardiovascular: Negative for chest pain and palpitations  Gastrointestinal: Positive for nausea and vomiting  Negative for abdominal pain  Genitourinary: Negative for dysuria and hematuria  Musculoskeletal: Positive for gait problem and myalgias  Negative for arthralgias and back pain  Pain in extremity- loss of right lower leg amputation     Skin: Negative for color change and rash  Neurological: Negative for seizures and syncope  All other systems reviewed and are negative  Past Medical History:   Diagnosis Date    Arthritis     [facility]     GERD (gastroesophageal reflux disease)     Hypercholesteremia     Hypertension     Ischemia of right lower extremity with suspected rhabdomyolysis 2/6/2021    Left Hydropneumothorax 2/10/2021    Methadone use     Pneumonia due to COVID-19 virus 1/11/2021    Subacute osteomyelitis of right femur ([facility] ) 9/13/2021       Past Surgical History:   Procedure Laterality Date    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    Procedure: AMPUTATION ABOVE KNEE (AKA);   Surgeon: Elana Marrufo MD;  [facility]: [facility];  Service: Vascular    AMPUTATION ABOVE KNEE (AKA) Right 1/18/2021    Procedure: AMPUTATION ABOVE KNEE (AKA) FORMALIZATION,  R AKA wound washout, wound closure;  Surgeon: Elana Marrufo MD;  [facility];  Service: Vascular    ARTERIOGRAM Right 1/13/2021    Procedure: ARTERIOGRAM;  Surgeon: Shreyas Bansal DO;  [facility]: [facility];  Service: Vascular    IR CHEST TUBE PLACEMENT  2/10/2021    IR LOWER EXTREMITY ANGIOGRAM  1/14/2021    THROMBECTOMY W/ EMBOLECTOMY Right 1/13/2021    Procedure: EMBOLECTOMY/THROMBECTOMY LOWER EXTREMITY; Surgeon: Thelma Sauceda DO;  [facility]: [facility];  Service: Vascular       Family History   Problem Relation Age of Onset    Diabetes Mother     Hypertension Father     Leukemia Father     Acute lymphoblastic leukemia Father     Cancer Sister        Social History     Occupational History    Not on file   Tobacco Use    Smoking status: Former Smoker    Smokeless tobacco: Never Used   Vaping Use    Vaping Use: Never used   Substance and Sexual Activity    Alcohol use: Not Currently    Drug use: No     Comment: methadone    Sexual activity: Not on file         Current Outpatient Medications:     acetaminophen (TYLENOL) 325 mg tablet, Take 3 tablets (975 mg total) by mouth every 8 (eight) hours (Patient taking differently: Take 650 mg by mouth 3 (three) times a day as needed for moderate pain), Disp: , Rfl: 0    albuterol (PROVENTIL HFA,VENTOLIN HFA) 90 mcg/act inhaler, Inhale 2 puffs every 4 (four) hours as needed for wheezing, Disp: , Rfl: 0    aspirin 81 mg chewable tablet, Chew 81 mg daily, Disp: , Rfl:     atorvastatin (LIPITOR) 20 mg tablet, Take 1 tablet (20 mg total) by mouth daily, Disp: 90 tablet, Rfl: 3    BinaxNOW COVID-19 Ag Home Test KIT, , Disp: , Rfl:     bisacodyl (DULCOLAX) 5 mg EC tablet, Take as instructed by GI office for bowel prep for colonoscopy, Disp: 2 tablet, Rfl: 0    Blood Glucose Monitoring Suppl (OneTouch Verio) w/Device KIT, Use to test blood sugars 3 times daily, Disp: 1 kit, Rfl: 0    cholecalciferol (VITAMIN D3) 1,000 units tablet, Take 2 tablets (2,000 Units total) by mouth daily, Disp: 12 tablet, Rfl: 0    Continuous Blood Gluc  (FreeStyle Ke 2 Crowley) JOSEPH, Use as directed, Disp: 1 each, Rfl: 0    Continuous Blood Gluc Sensor (FreeStyle Ke 2 Sensor) MISC, Change every 14 days, Disp: 6 each, Rfl: 1    Diclofenac Sodium (VOLTAREN) 1 %, Apply 4 g topically 4 (four) times a day (Patient taking differently: Apply 4 g topically 4 (four) times a day as needed), Disp: , Rfl: 0    DULoxetine (CYMBALTA) 60 mg delayed release capsule, Take 1 capsule (60 mg total) by mouth in the morning, Disp: 30 capsule, Rfl: 1    Eliquis 5 MG, Take 1 tablet by mouth twice daily, Disp: 180 tablet, Rfl: 2    fluticasone (FLONASE) 50 mcg/act nasal spray, 1 spray into each nostril daily (Patient taking differently: 1 spray into each nostril if needed), Disp: 16 g, Rfl: 2    glucose blood test strip, Use 1 each 3 (three) times daily after meals Use as instructed, Disp: 270 each, Rfl: 3    insulin glargine (LANTUS) 100 units/mL subcutaneous injection, Inject 27 Units under the skin daily at bedtime, Disp: 10 mL, Rfl: 3    insulin lispro (HumaLOG KwikPen) 100 units/mL injection pen, Blood Glucose 150 - 224: 4 Unit of Insulin Blood Glucose 225 - 299: 5 Units of Insulin Blood Glucose 300 - 374: 6 Units of Insulin Blood Glucose 375 - 449: 7 Units of Insulin Blood Glucose greater than or equal to 450: 8 Units of Insulin Max of 24 units per day, Disp: 15 mL, Rfl: 3    Insulin Pen Needle 31G X 5 MM MISC, Use daily Pt to inject 4 X daily, Disp: 100 each, Rfl: 5    Insulin Pen Needle 31G X 5 MM MISC, 30 units sq 2X daily, Disp: 100 each, Rfl: 3    Insulin Syringe-Needle U-100 (BD Veo Insulin Syringe U/F) 31G X 15/64" 0 5 ML MISC, Inject under the skin 3 (three) times a day Use as directed, Disp: 300 each, Rfl: 5    Janumet  MG per tablet, TAKE 1 TABLET BY MOUTH TWICE DAILY WITH MEALS, Disp: 180 tablet, Rfl: 0    Jardiance 25 MG TABS, TAKE 1 TABLET BY MOUTH ONCE DAILY IN THE MORNING, Disp: 30 tablet, Rfl: 0    levocetirizine (XYZAL) 5 MG tablet, TAKE 1 TABLET BY MOUTH ONCE DAILY IN THE EVENING, Disp: 90 tablet, Rfl: 01    lidocaine (LMX) 4 % cream, Apply topically 4 (four) times a day as needed for mild pain, Disp: 30 g, Rfl: 0    lisinopril (ZESTRIL) 10 mg tablet, Take 1 tablet by mouth once daily, Disp: 90 tablet, Rfl: 0    magnesium oxide (MAG-OX) 400 mg, Take 1 tablet (400 mg total) by mouth 2 (two) times a day, Disp: 60 tablet, Rfl: 0    menthol-methyl salicylate (BENGAY) 33-98 % cream, Apply topically 4 (four) times a day as needed (torticollis), Disp: , Rfl: 0    methadone (DOLOPHINE) 10 mg tablet, Take 70 mg by mouth daily,, Disp: , Rfl:     multivitamin-minerals (CENTRUM ADULTS) tablet, Take 1 tablet by mouth daily, Disp: , Rfl: 0    naloxone (NARCAN) 4 mg/0 1 mL nasal spray, Administer 1 spray into a nostril  If no response after 2-3 minutes, give another dose in the other nostril using a new spray , Disp: 1 each, Rfl: 1    ondansetron (ZOFRAN-ODT) 4 mg disintegrating tablet, DISSOLVE 1 TABLET IN MOUTH EVERY 6 HOURS AS NEEDED FOR NAUSEA OR VOMITING, Disp: 40 tablet, Rfl: 0    pantoprazole (PROTONIX) 40 mg tablet, Take 1 tablet (40 mg total) by mouth daily, Disp: 30 tablet, Rfl: 3    polyethylene glycol (GOLYTELY) 4000 mL solution, Take as instructed by GI office for bowel prep, Disp: 4000 mL, Rfl: 0    silver sulfadiazine (SILVADENE,SSD) 1 % cream, Apply topically daily Apply as directed to left heel ulcer daily with each dressing change , Disp: 25 g, Rfl: 2    Allergies   Allergen Reactions    Varenicline        Physical Exam:    /79   Pulse 92   Ht 5' 8" (1 727 m)   BMI 24 33 kg/m²     Constitutional:normal, well developed, well nourished, alert, in no distress and non-toxic and no overt pain behavior  Eyes:anicteric  HEENT:grossly intact  Neck:supple, symmetric, trachea midline and no masses   Pulmonary:even and unlabored  Cardiovascular:No edema or pitting edema present  Skin:Normal without rashes or lesions and well hydrated  Psychiatric:Mood and affect appropriate  Neurologic:Cranial Nerves II-XII grossly intact  Musculoskeletal:in wheelchair  Right AKA  Imaging  No orders to display       No orders of the defined types were placed in this encounter

## 2022-09-04 DIAGNOSIS — Z79.4 TYPE 2 DIABETES MELLITUS WITH DIABETIC POLYNEUROPATHY, WITH LONG-TERM CURRENT USE OF INSULIN (HCC): ICD-10-CM

## 2022-09-04 DIAGNOSIS — E11.42 TYPE 2 DIABETES MELLITUS WITH DIABETIC POLYNEUROPATHY, WITH LONG-TERM CURRENT USE OF INSULIN (HCC): ICD-10-CM

## 2022-09-05 RX ORDER — EMPAGLIFLOZIN 25 MG/1
TABLET, FILM COATED ORAL
Qty: 30 TABLET | Refills: 0 | Status: SHIPPED | OUTPATIENT
Start: 2022-09-05 | End: 2022-10-03

## 2022-09-06 ENCOUNTER — OFFICE VISIT (OUTPATIENT)
Dept: FAMILY MEDICINE CLINIC | Facility: CLINIC | Age: 62
End: 2022-09-06
Payer: COMMERCIAL

## 2022-09-06 VITALS
TEMPERATURE: 98.4 F | DIASTOLIC BLOOD PRESSURE: 76 MMHG | SYSTOLIC BLOOD PRESSURE: 132 MMHG | OXYGEN SATURATION: 99 % | HEART RATE: 99 BPM

## 2022-09-06 DIAGNOSIS — R20.8 BURNING SENSATION: ICD-10-CM

## 2022-09-06 DIAGNOSIS — Z79.4 TYPE 2 DIABETES MELLITUS WITH HYPERGLYCEMIA, WITH LONG-TERM CURRENT USE OF INSULIN (HCC): ICD-10-CM

## 2022-09-06 DIAGNOSIS — Z00.00 ANNUAL PHYSICAL EXAM: Primary | ICD-10-CM

## 2022-09-06 DIAGNOSIS — Z12.5 SCREENING FOR PROSTATE CANCER: ICD-10-CM

## 2022-09-06 DIAGNOSIS — Z23 ENCOUNTER FOR IMMUNIZATION: ICD-10-CM

## 2022-09-06 DIAGNOSIS — Z72.0 TOBACCO ABUSE: ICD-10-CM

## 2022-09-06 DIAGNOSIS — E11.65 TYPE 2 DIABETES MELLITUS WITH HYPERGLYCEMIA, WITH LONG-TERM CURRENT USE OF INSULIN (HCC): ICD-10-CM

## 2022-09-06 PROCEDURE — 0054A PR IMM ADMN SARSCOV2 30MCG/0.3ML TRIS-SUCROSE BST: CPT

## 2022-09-06 PROCEDURE — 99396 PREV VISIT EST AGE 40-64: CPT | Performed by: NURSE PRACTITIONER

## 2022-09-06 PROCEDURE — 91305 PR SARSCOV2 VACCINE 30MCG/0.3ML TRIS-SUCROSE IM USE: CPT

## 2022-09-06 RX ORDER — NICOTINE 21 MG/24HR
1 PATCH, TRANSDERMAL 24 HOURS TRANSDERMAL EVERY 24 HOURS
Qty: 28 PATCH | Refills: 0 | Status: SHIPPED | OUTPATIENT
Start: 2022-09-06 | End: 2022-10-20

## 2022-09-06 RX ORDER — FLASH GLUCOSE SENSOR
KIT MISCELLANEOUS
Qty: 6 EACH | Refills: 2 | Status: SHIPPED | OUTPATIENT
Start: 2022-09-06 | End: 2022-09-20 | Stop reason: SDUPTHER

## 2022-09-06 NOTE — PATIENT INSTRUCTIONS
Have labs completed and I will call you with the results  Wellness Visit for Adults   AMBULATORY CARE:   A wellness visit  is when you see your healthcare provider to get screened for health problems  Your healthcare provider will also give you advice on how to stay healthy  Write down your questions so you remember to ask them  Ask your healthcare provider how often you should have a wellness visit  What happens at a wellness visit:  Your healthcare provider will ask about your health, and your family history of health problems  This includes high blood pressure, heart disease, and cancer  He or she will ask if you have symptoms that concern you, if you smoke, and about your mood  You may also be asked about your intake of medicines, supplements, food, and alcohol  Any of the following may be done:  · Your weight  will be checked  Your height may also be checked so your body mass index (BMI) can be calculated  Your BMI shows if you are at a healthy weight  · Your blood pressure  and heart rate will be checked  Your temperature may also be checked  · Blood and urine tests  may be done  Blood tests may be done to check your cholesterol levels  Abnormal cholesterol levels increase your risk for heart disease and stroke  You may also need a blood or urine test to check for diabetes if you are at increased risk  Urine tests may be done to look for signs of an infection or kidney disease  · A physical exam  includes checking your heartbeat and lungs with a stethoscope  Your healthcare provider may also check your skin to look for sun damage  · Screening tests  may be recommended  A screening test is done to check for diseases that may not cause symptoms  The screening tests you may need depend on your age, gender, family history, and lifestyle habits  For example, colorectal screening may be recommended if you are 48years old or older      Screening tests you need if you are a woman:   · A Pap smear  is used to screen for cervical cancer  Pap smears are usually done every 3 to 5 years depending on your age  You may need them more often if you have had abnormal Pap smear test results in the past  Ask your healthcare provider how often you should have a Pap smear  · A mammogram  is an x-ray of your breasts to screen for breast cancer  Experts recommend mammograms every 2 years starting at age 48 years  You may need a mammogram at age 52 years or younger if you have an increased risk for breast cancer  Talk to your healthcare provider about when you should start having mammograms and how often you need them  Vaccines you may need:   · Get an influenza vaccine  every year  The influenza vaccine protects you from the flu  Several types of viruses cause the flu  The viruses change over time, so new vaccines are made each year  · Get a tetanus-diphtheria (Td) booster vaccine  every 10 years  This vaccine protects you against tetanus and diphtheria  Tetanus is a severe infection that may cause painful muscle spasms and lockjaw  Diphtheria is a severe bacterial infection that causes a thick covering in the back of your mouth and throat  · Get a human papillomavirus (HPV) vaccine  if you are female and aged 23 to 32 or male 23 to 24 and never received it  This vaccine protects you from HPV infection  HPV is the most common infection spread by sexual contact  HPV may also cause vaginal, penile, and anal cancers  · Get a pneumococcal vaccine  if you are aged 72 years or older  The pneumococcal vaccine is an injection given to protect you from pneumococcal disease  Pneumococcal disease is an infection caused by pneumococcal bacteria  The infection may cause pneumonia, meningitis, or an ear infection  · Get a shingles vaccine  if you are 60 or older, even if you have had shingles before  The shingles vaccine is an injection to protect you from the varicella-zoster virus   This is the same virus that causes chickenpox  Shingles is a painful rash that develops in people who had chickenpox or have been exposed to the virus  How to eat healthy:  My Plate is a model for planning healthy meals  It shows the types and amounts of foods that should go on your plate  Fruits and vegetables make up about half of your plate, and grains and protein make up the other half  A serving of dairy is included on the side of your plate  The amount of calories and serving sizes you need depends on your age, gender, weight, and height  Examples of healthy foods are listed below:  · Eat a variety of vegetables  such as dark green, red, and orange vegetables  You can also include canned vegetables low in sodium (salt) and frozen vegetables without added butter or sauces  · Eat a variety of fresh fruits , canned fruit in 100% juice, frozen fruit, and dried fruit  · Include whole grains  At least half of the grains you eat should be whole grains  Examples include whole-wheat bread, wheat pasta, brown rice, and whole-grain cereals such as oatmeal     · Eat a variety of protein foods such as seafood (fish and shellfish), lean meat, and poultry without skin (turkey and chicken)  Examples of lean meats include pork leg, shoulder, or tenderloin, and beef round, sirloin, tenderloin, and extra lean ground beef  Other protein foods include eggs and egg substitutes, beans, peas, soy products, nuts, and seeds  · Choose low-fat dairy products such as skim or 1% milk or low-fat yogurt, cheese, and cottage cheese  · Limit unhealthy fats  such as butter, hard margarine, and shortening  Exercise:  Exercise at least 30 minutes per day on most days of the week  Some examples of exercise include walking, biking, dancing, and swimming  You can also fit in more physical activity by taking the stairs instead of the elevator or parking farther away from stores  Include muscle strengthening activities 2 days each week   Regular exercise provides many health benefits  It helps you manage your weight, and decreases your risk for type 2 diabetes, heart disease, stroke, and high blood pressure  Exercise can also help improve your mood  Ask your healthcare provider about the best exercise plan for you  General health and safety guidelines:   · Do not smoke  Nicotine and other chemicals in cigarettes and cigars can cause lung damage  Ask your healthcare provider for information if you currently smoke and need help to quit  E-cigarettes or smokeless tobacco still contain nicotine  Talk to your healthcare provider before you use these products  · Limit alcohol  A drink of alcohol is 12 ounces of beer, 5 ounces of wine, or 1½ ounces of liquor  · Lose weight, if needed  Being overweight increases your risk of certain health conditions  These include heart disease, high blood pressure, type 2 diabetes, and certain types of cancer  · Protect your skin  Do not sunbathe or use tanning beds  Use sunscreen with a SPF 15 or higher  Apply sunscreen at least 15 minutes before you go outside  Reapply sunscreen every 2 hours  Wear protective clothing, hats, and sunglasses when you are outside  · Drive safely  Always wear your seatbelt  Make sure everyone in your car wears a seatbelt  A seatbelt can save your life if you are in an accident  Do not use your cell phone when you are driving  This could distract you and cause an accident  Pull over if you need to make a call or send a text message  · Practice safe sex  Use latex condoms if are sexually active and have more than one partner  Your healthcare provider may recommend screening tests for sexually transmitted infections (STIs)  · Wear helmets, lifejackets, and protective gear  Always wear a helmet when you ride a bike or motorcycle, go skiing, or play sports that could cause a head injury  Wear protective equipment when you play sports   Wear a lifejacket when you are on a boat or doing water sports  © Copyright [employer] 2022 Information is for End User's use only and may not be sold, redistributed or otherwise used for commercial purposes  All illustrations and images included in CareNotes® are the copyrighted property of [employer]  or Carol Chu  The above information is an  only  It is not intended as medical advice for individual conditions or treatments  Talk to your doctor, nurse or pharmacist before following any medical regimen to see if it is safe and effective for you

## 2022-09-06 NOTE — PROGRESS NOTES
ADULT ANNUAL [address] [facility]    NAME: Jose Bojorquez  AGE: 57 y o  SEX: male  : 1960     DATE: 2022     Assessment and Plan:     Problem List Items Addressed This Visit        Endocrine    Type 2 diabetes mellitus, with long-term current use of insulin (HCC) (Chronic)    Relevant Medications    Continuous Blood Gluc Sensor (FreeStyle Ke 2 Sensor) MISC      Other Visit Diagnoses     Annual physical exam    -  Primary    Screening for prostate cancer        Relevant Orders    PSA, Total Screen    Burning sensation        Relevant Orders    UA w Reflex to Microscopic w Reflex to Culture -Lab Collect    Encounter for immunization        Relevant Orders    COVID-19 Pfizer vacc hesham-sucrose gray cap formulation (Completed)    Tobacco abuse        Relevant Medications    nicotine (NICODERM CQ) 14 mg/24hr TD 24 hr patch          Immunizations and preventive care screenings were discussed with patient today  Appropriate education was printed on patient's after visit summary  Counseling:  Dental Health: discussed importance of regular tooth brushing, flossing, and dental visits  Sexual health: discussed sexually transmitted diseases, partner selection, use of condoms, avoidance of unintended pregnancy, and contraceptive alternatives  Exercise: the importance of regular exercise/physical activity was discussed  Recommend exercise 3-5 times per week for at least 30 minutes  BMI Counseling: There is no height or weight on file to calculate BMI  The BMI is above normal  Nutrition recommendations include decreasing portion sizes, encouraging healthy choices of fruits and vegetables, decreasing fast food intake, consuming healthier snacks, limiting drinks that contain sugar, moderation in carbohydrate intake, increasing intake of lean protein, reducing intake of saturated and trans fat and reducing intake of cholesterol   Rationale for BMI follow-up plan is due to patient being overweight or obese  Tobacco Cessation Counseling: Tobacco cessation counseling was provided  The patient is sincerely urged to quit consumption of tobacco  He is ready to quit tobacco  Medication options and side effects of medication discussed  Patient agreed to medication  Nicotine patch was prescribed  Started to smoke a few months ago after family member passed away  Is smoking between 3-10 cigarettes a day  He is willing to quit  Previously smoked about 7 years  Return in about 3 months (around 2022) for Recheck- DM   Chief Complaint:     Chief Complaint   Patient presents with    Physical Exam     Annual       History of Present Illness:     Adult Annual Physical   Patient here for a comprehensive physical exam  The patient reports no problems  DM- does not wish to return to endocrinology due for labs, have completed  Monitors his sugars on his meter- average sugar is around 140  Still have nauseas- does have colonoscopy and EGD scheduled in October  Does have intermittent muscle cramps and was having urinary burning and dark urine- likely dehydrated  Increase hydration  Unable to give urine so lab sample ordered  Diet and Physical Activity  Diet/Nutrition: well balanced diet, limited junk food and consuming 3-5 servings of fruits/vegetables daily  Exercise: no formal exercise        Depression Screening  PHQ-2/9 Depression Screening    Little interest or pleasure in doing things: 0 - not at all  Feeling down, depressed, or hopeless: 0 - not at all  Trouble falling or staying asleep, or sleeping too much: 0 - not at all  Feeling tired or having little energy: 0 - not at all  Poor appetite or overeatin - not at all  Feeling bad about yourself - or that you are a failure or have let yourself or your family down: 0 - not at all  Trouble concentrating on things, such as reading the newspaper or watching television: 0 - not at all  Moving or speaking so slowly that other people could have noticed  Or the opposite - being so fidgety or restless that you have been moving around a lot more than usual: 0 - not at all  Thoughts that you would be better off dead, or of hurting yourself in some way: 0 - not at all  PHQ-9 Score: 0   PHQ-9 Interpretation: No or Minimal depression        General Health  Sleep: gets 7-8 hours of sleep on average  Hearing: normal - bilateral   Vision: goes for regular eye exams  Dental: regular dental visits   Health  Symptoms include: none     Review of Systems:     Review of Systems   Past Medical History:     Past Medical History:   Diagnosis Date    Arthritis     [facility]     GERD (gastroesophageal reflux disease)     Hypercholesteremia     Hypertension     Ischemia of right lower extremity with suspected rhabdomyolysis 2/6/2021    Left Hydropneumothorax 2/10/2021    Methadone use     Pneumonia due to COVID-19 virus 1/11/2021    Subacute osteomyelitis of right femur ([facility] ) 9/13/2021      Past Surgical History:     Past Surgical History:   Procedure Laterality Date    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    Procedure: AMPUTATION ABOVE KNEE (AKA);   Surgeon: Alanna Suárez MD;  [facility];  Service: Vascular    AMPUTATION ABOVE KNEE (AKA) Right 1/18/2021    Procedure: AMPUTATION ABOVE KNEE (AKA) FORMALIZATION,  R AKA wound washout, wound closure;  Surgeon: Alanna Suárez MD;  [facility];  Service: Vascular    ARTERIOGRAM Right 1/13/2021    Procedure: ARTERIOGRAM;  Surgeon: Rhonda Townsend DO;  [facility];  Service: Vascular    IR CHEST TUBE PLACEMENT  2/10/2021    IR LOWER EXTREMITY ANGIOGRAM  1/14/2021    THROMBECTOMY W/ EMBOLECTOMY Right 1/13/2021    Procedure: EMBOLECTOMY/THROMBECTOMY LOWER EXTREMITY;  Surgeon: Rhonda Townsend DO;  [facility];  Service: Vascular      Family History:     Family History   Problem Relation Age of Onset    Diabetes Mother     Hypertension Father     Leukemia Father     Acute lymphoblastic leukemia Father     Cancer Sister       Social History:     Social History     Socioeconomic History    Marital status:      Spouse name: None    Number of children: None    Years of education: None    Highest education level: None   Occupational History    None   Tobacco Use    Smoking status: Current Every Day Smoker     Packs/day: 0 50    Smokeless tobacco: Never Used   Vaping Use    Vaping Use: Never used   Substance and Sexual Activity    Alcohol use: Not Currently    Drug use: No     Comment: methadone    Sexual activity: None   Other Topics Concern    None   Social History Narrative    None     Social Determinants of Health     Financial Resource Strain: Not on file   Food Insecurity: Not on file   Transportation Needs: No Transportation Needs    Lack of Transportation (Medical): No    Lack of Transportation (Non-Medical):  No   Physical Activity: Not on file   Stress: Not on file   Social Connections: Not on file   Intimate Partner Violence: Not on file   Housing Stability: Not on file      Current Medications:     Current Outpatient Medications   Medication Sig Dispense Refill    acetaminophen (TYLENOL) 325 mg tablet Take 3 tablets (975 mg total) by mouth every 8 (eight) hours (Patient taking differently: Take 650 mg by mouth 3 (three) times a day as needed for moderate pain)  0    albuterol (PROVENTIL HFA,VENTOLIN HFA) 90 mcg/act inhaler Inhale 2 puffs every 4 (four) hours as needed for wheezing  0    aspirin 81 mg chewable tablet Chew 81 mg daily      atorvastatin (LIPITOR) 20 mg tablet Take 1 tablet (20 mg total) by mouth daily 90 tablet 3    bisacodyl (DULCOLAX) 5 mg EC tablet Take as instructed by GI office for bowel prep for colonoscopy 2 tablet 0    Blood Glucose Monitoring Suppl (OneTouch Verio) w/Device KIT Use to test blood sugars 3 times daily 1 kit 0    cholecalciferol (VITAMIN D3) 1,000 units tablet Take 2 tablets (2,000 Units total) by mouth daily 12 tablet 0    Continuous Blood Gluc  (FreeStyle No 2 [place]) JOSEPH Use as directed 1 each 0    Continuous Blood Gluc Sensor (FreeStyle Ke 2 Sensor) MISC Change every 14 days 6 each 2    Diclofenac Sodium (VOLTAREN) 1 % Apply 4 g topically 4 (four) times a day (Patient taking differently: Apply 4 g topically 4 (four) times a day as needed)  0    DULoxetine (CYMBALTA) 60 mg delayed release capsule Take 1 capsule (60 mg total) by mouth in the morning 30 capsule 1    Eliquis 5 MG Take 1 tablet by mouth twice daily 180 tablet 2    fluticasone (FLONASE) 50 mcg/act nasal spray 1 spray into each nostril daily (Patient taking differently: 1 spray into each nostril if needed) 16 g 2    glucose blood test strip Use 1 each 3 (three) times daily after meals Use as instructed 270 each 3    insulin glargine (LANTUS) 100 units/mL subcutaneous injection Inject 27 Units under the skin daily at bedtime 10 mL 3    insulin lispro (HumaLOG KwikPen) 100 units/mL injection pen Blood Glucose 150 - 224: 4 Unit of Insulin Blood Glucose 225 - 299: 5 Units of Insulin Blood Glucose 300 - 374: 6 Units of Insulin Blood Glucose 375 - 449: 7 Units of Insulin Blood Glucose greater than or equal to 450: 8 Units of Insulin Max of 24 units per day 15 mL 3    Insulin Pen Needle 31G X 5 MM MISC Use daily Pt to inject 4 X daily 100 each 5    Insulin Pen Needle 31G X 5 MM MISC 30 units sq 2X daily 100 each 3    Insulin Syringe-Needle U-100 (BD Veo Insulin Syringe U/F) 31G X 15/64" 0 5 ML MISC Inject under the skin 3 (three) times a day Use as directed 300 each 5    Janumet  MG per tablet TAKE 1 TABLET BY MOUTH TWICE DAILY WITH MEALS 180 tablet 0    Jardiance 25 MG TABS TAKE 1 TABLET BY MOUTH ONCE DAILY IN THE MORNING 30 tablet 0    levocetirizine (XYZAL) 5 MG tablet TAKE 1 TABLET BY MOUTH ONCE DAILY IN THE EVENING 90 tablet 01    lidocaine (LMX) 4 % cream Apply topically 4 (four) times a day as needed for mild pain 30 g 0    lisinopril (ZESTRIL) 10 mg tablet Take 1 tablet by mouth once daily 90 tablet 0    magnesium oxide (MAG-OX) 400 mg Take 1 tablet (400 mg total) by mouth 2 (two) times a day 60 tablet 0    menthol-methyl salicylate (BENGAY) 17-22 % cream Apply topically 4 (four) times a day as needed (torticollis)  0    methadone (DOLOPHINE) 10 mg tablet Take 70 mg by mouth daily,      multivitamin-minerals (CENTRUM ADULTS) tablet Take 1 tablet by mouth daily  0    naloxone (NARCAN) 4 mg/0 1 mL nasal spray Administer 1 spray into a nostril  If no response after 2-3 minutes, give another dose in the other nostril using a new spray  1 each 1    nicotine (NICODERM CQ) 14 mg/24hr TD 24 hr patch Place 1 patch on the skin every 24 hours 28 patch 0    ondansetron (ZOFRAN-ODT) 4 mg disintegrating tablet DISSOLVE 1 TABLET IN MOUTH EVERY 6 HOURS AS NEEDED FOR NAUSEA OR VOMITING 40 tablet 0    pantoprazole (PROTONIX) 40 mg tablet Take 1 tablet (40 mg total) by mouth daily 30 tablet 3    polyethylene glycol (GOLYTELY) 4000 mL solution Take as instructed by GI office for bowel prep 4000 mL 0    silver sulfadiazine (SILVADENE,SSD) 1 % cream Apply topically daily Apply as directed to left heel ulcer daily with each dressing change  25 g 2    BinaxNOW COVID-19 Ag Home Test KIT  (Patient not taking: Reported on 9/6/2022)       No current facility-administered medications for this visit  Allergies: Allergies   Allergen Reactions    Varenicline       Physical Exam:     /76 (BP Location: Left arm, Patient Position: Sitting)   Pulse 99   Temp 98 4 °F (36 9 °C)   SpO2 99%     Physical Exam  Constitutional:       General: He is not in acute distress  Appearance: Normal appearance  He is not ill-appearing or toxic-appearing  HENT:      Head: Normocephalic and atraumatic        Right Ear: Tympanic membrane, ear canal and external ear normal  There is no impacted cerumen  Left Ear: Tympanic membrane, ear canal and external ear normal  There is no impacted cerumen  Nose: Nose normal  No congestion or rhinorrhea  Mouth/Throat:      Mouth: Mucous membranes are moist       Pharynx: Oropharynx is clear  No oropharyngeal exudate or posterior oropharyngeal erythema  Eyes:      General: No scleral icterus  Right eye: No discharge  Left eye: No discharge  Conjunctiva/sclera: Conjunctivae normal       Pupils: Pupils are equal, round, and reactive to light  Cardiovascular:      Rate and Rhythm: Normal rate and regular rhythm  Pulses: Normal pulses  Heart sounds: Normal heart sounds  No murmur heard  No friction rub  No gallop  Pulmonary:      Effort: Pulmonary effort is normal  No respiratory distress  Breath sounds: Normal breath sounds  No stridor  No wheezing, rhonchi or rales  Abdominal:      General: Abdomen is flat  Bowel sounds are normal  There is no distension  Palpations: Abdomen is soft  There is no mass  Tenderness: There is no abdominal tenderness  Musculoskeletal:         General: No swelling, tenderness, deformity or signs of injury  Normal range of motion  Cervical back: Normal range of motion and neck supple  No rigidity  No muscular tenderness  Comments: Right AKA   Lymphadenopathy:      Cervical: No cervical adenopathy  Skin:     General: Skin is warm and dry  Capillary Refill: Capillary refill takes less than 2 seconds  Coloration: Skin is not jaundiced or pale  Findings: No bruising or lesion  Neurological:      General: No focal deficit present  Mental Status: He is alert and oriented to person, place, and time  Mental status is at baseline  Sensory: No sensory deficit  Motor: No weakness        Gait: Gait normal    Psychiatric:         Mood and Affect: Mood normal          Behavior: Behavior normal          Thought Content:  Thought content normal          Judgment: Judgment normal           Kurt Fournier, [address]

## 2022-09-19 ENCOUNTER — OFFICE VISIT (OUTPATIENT)
Dept: GASTROENTEROLOGY | Facility: CLINIC | Age: 62
End: 2022-09-19
Payer: COMMERCIAL

## 2022-09-19 VITALS — HEART RATE: 104 BPM | TEMPERATURE: 98.5 F | DIASTOLIC BLOOD PRESSURE: 70 MMHG | SYSTOLIC BLOOD PRESSURE: 112 MMHG

## 2022-09-19 DIAGNOSIS — R11.14 BILIOUS VOMITING WITH NAUSEA: ICD-10-CM

## 2022-09-19 DIAGNOSIS — Z12.11 COLON CANCER SCREENING: ICD-10-CM

## 2022-09-19 DIAGNOSIS — K21.9 GASTROESOPHAGEAL REFLUX DISEASE, UNSPECIFIED WHETHER ESOPHAGITIS PRESENT: Primary | ICD-10-CM

## 2022-09-19 DIAGNOSIS — K59.00 CONSTIPATION, UNSPECIFIED CONSTIPATION TYPE: ICD-10-CM

## 2022-09-19 PROCEDURE — 99214 OFFICE O/P EST MOD 30 MIN: CPT | Performed by: NURSE PRACTITIONER

## 2022-09-19 RX ORDER — FAMOTIDINE 20 MG/1
20 TABLET, FILM COATED ORAL 2 TIMES DAILY
Qty: 30 TABLET | Refills: 8 | Status: SHIPPED | OUTPATIENT
Start: 2022-09-19

## 2022-09-19 NOTE — PROGRESS NOTES
[facility] - Outpatient Follow-up Note  Matt Kaye 57 y o  male MRN: [id number]  Encounter: [id number]          ASSESSMENT AND PLAN:      1  Gastroesophageal reflux disease, unspecified whether esophagitis present  2  Bilious vomiting with nausea    Patient has a history GERD in which he takes pantoprazole 40 mg daily  He denies any acid reflux  He does report however nausea with occasional vomiting most mornings  He states the vomiting is bile/acid like  He denies any dysphagia  He was recommended for EGD at his last visit in June for the symptoms however needed to cancel due to family issues  Encouraged patient to have this done  He states he is scheduled for October 25th  Discussed starting Pepcid at bedtime as his symptoms may be related to acid reflux  Patient verbalized understanding and is agreeable  Given his diabetes, also discussed gastroparesis and gastric emptying study  Patient would like to undergo EGD 1st and discuss at next visit  - famotidine (PEPCID) 20 mg tablet; Take 1 tablet (20 mg total) by mouth 2 (two) times a day  Dispense: 30 tablet; Refill: 8  - EGD      3  Constipation, unspecified constipation type    Patient reports that he will often go up to 3 days without a bowel movement with some abdominal discomfort  Patient states that he uses stool softeners occasionally  Encouraged patient to start MiraLax 1 cap full daily and titrate up or down as needed  - start MiraLax 1 cap full daily and titrate up or down as needed    4  Colon cancer screening    Patient has never had a colonoscopy in the past   He was recommended at his last visit in June however had to cancel due to family issues  Encouraged patient to have this done  He states that he is scheduled for October 25th     - colonoscopy     Will see patient back after procedures    ______________________________________________________________________    SUBJECTIVE:  Libby Gomez is a 49-year-old male that presents today for a follow-up for nausea and vomiting, GERD and constipation  Patient was seen in June with these symptoms and recommended for ultrasound, EGD and colonoscopy however he states that he had to reschedule due to family issues  Patient reports that he continues with nausea and occasional vomiting most mornings  He states sometimes the nausea will then Last the remainder of the day and other times he feels better by the afternoon  He states that the vomiting is not typically food but bile  He denies any significant acid reflux and does take pantoprazole daily in the morning  He denies any dysphagia  He reports that he continues with constipation  He reports typically going about 3 days without a bowel movement that does cause some abdominal discomfort  He states that he occasionally takes stool softeners with little relief  He denies black or bloody stools  REVIEW OF SYSTEMS:  Review of Systems   HENT: Negative for trouble swallowing  Gastrointestinal: Positive for abdominal pain, constipation, nausea and vomiting  Negative for abdominal distention, anal bleeding, blood in stool, diarrhea and rectal pain  All other systems reviewed and are negative  Historical Information   Past Medical History:   Diagnosis Date    Arthritis     [facility]     GERD (gastroesophageal reflux disease)     Hypercholesteremia     Hypertension     Ischemia of right lower extremity with suspected rhabdomyolysis 2/6/2021    Left Hydropneumothorax 2/10/2021    Methadone use     Pneumonia due to COVID-19 virus 1/11/2021    Subacute osteomyelitis of right femur ([facility] ) 9/13/2021     Past Surgical History:   Procedure Laterality Date    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    Procedure: AMPUTATION ABOVE KNEE (AKA);   Surgeon: Ayan Blackburn MD;  [facility];  Service: Vascular    AMPUTATION ABOVE KNEE (AKA) Right 1/18/2021    Procedure: AMPUTATION ABOVE KNEE (AKA) FORMALIZATION,  R AKA wound washout, wound closure;  Surgeon: Cha Pearce MD;  [facility];  Service: Vascular    ARTERIOGRAM Right 1/13/2021    Procedure: ARTERIOGRAM;  Surgeon: Mor Carlson DO;  [facility];  Service: Vascular    IR CHEST TUBE PLACEMENT  2/10/2021    IR LOWER EXTREMITY ANGIOGRAM  1/14/2021    THROMBECTOMY W/ EMBOLECTOMY Right 1/13/2021    Procedure: EMBOLECTOMY/THROMBECTOMY LOWER EXTREMITY;  Surgeon: Mor Carlson DO;  [facility];  Service: Vascular     Social History   Social History     Substance and Sexual Activity   Alcohol Use Not Currently     Social History     Substance and Sexual Activity   Drug Use No    Comment: methadone     Social History     Tobacco Use   Smoking Status Current Every Day Smoker    Packs/day: 0 50   Smokeless Tobacco Never Used     Family History   Problem Relation Age of Onset    Diabetes Mother     Hypertension Father     Leukemia Father     Acute lymphoblastic leukemia Father     Cancer Sister        Meds/Allergies       Current Outpatient Medications:     famotidine (PEPCID) 20 mg tablet    acetaminophen (TYLENOL) 325 mg tablet    albuterol (PROVENTIL HFA,VENTOLIN HFA) 90 mcg/act inhaler    aspirin 81 mg chewable tablet    atorvastatin (LIPITOR) 20 mg tablet    BinaxNOW COVID-19 Ag Home Test KIT    bisacodyl (DULCOLAX) 5 mg EC tablet    Blood Glucose Monitoring Suppl (OneTouch Verio) w/Device KIT    cholecalciferol (VITAMIN D3) 1,000 units tablet    Continuous Blood Gluc  (FreeStyle Ke 2 [place]) JOSEPH    Continuous Blood Gluc Sensor (FreeStyle Ke 2 Sensor) MISC    Diclofenac Sodium (VOLTAREN) 1 %    DULoxetine (CYMBALTA) 60 mg delayed release capsule    Eliquis 5 MG    fluticasone (FLONASE) 50 mcg/act nasal spray    glucose blood test strip    insulin glargine (LANTUS) 100 units/mL subcutaneous injection    insulin lispro (HumaLOG KwikPen) 100 units/mL injection pen    Insulin Pen Needle 31G X 5 MM MISC    Insulin Pen Needle 31G X 5 MM MISC    Insulin Syringe-Needle U-100 (BD Veo Insulin Syringe U/F) 31G X 15/64" 0 5 ML MISC    Janumet  MG per tablet    Jardiance 25 MG TABS    levocetirizine (XYZAL) 5 MG tablet    lidocaine (LMX) 4 % cream    lisinopril (ZESTRIL) 10 mg tablet    magnesium oxide (MAG-OX) 400 mg    menthol-methyl salicylate (BENGAY) 54-00 % cream    methadone (DOLOPHINE) 10 mg tablet    multivitamin-minerals (CENTRUM ADULTS) tablet    naloxone (NARCAN) 4 mg/0 1 mL nasal spray    nicotine (NICODERM CQ) 14 mg/24hr TD 24 hr patch    ondansetron (ZOFRAN-ODT) 4 mg disintegrating tablet    pantoprazole (PROTONIX) 40 mg tablet    polyethylene glycol (GOLYTELY) 4000 mL solution    silver sulfadiazine (SILVADENE,SSD) 1 % cream    Allergies   Allergen Reactions    Varenicline            Objective     Blood pressure 112/70, pulse 104, temperature 98 5 °F (36 9 °C)  There is no height or weight on file to calculate BMI  PHYSICAL EXAM:      General Appearance:   Alert, cooperative, no distress   HEENT:   Normocephalic, atraumatic, anicteric  Neck:  Supple, symmetrical, trachea midline   Lungs:   Clear to auscultation bilaterally; no rales, rhonchi or wheezing; respirations unlabored    Heart[de-identified]   Regular rate and rhythm; no murmur, rub, or gallop  Abdomen:   Soft, non-tender, non-distended; normal bowel sounds; no masses, no organomegaly    Genitalia:   Deferred    Rectal:   Deferred    Extremities:  No cyanosis, clubbing or edema    Skin:  No jaundice, rashes, or lesions             Lab Results:   No visits with results within 1 Day(s) from this visit     Latest known visit with results is:   Appointment on 05/27/2022   Component Date Value    Sodium 05/27/2022 134 (A)    Potassium 05/27/2022 5 1     Chloride 05/27/2022 104     CO2 05/27/2022 30     ANION GAP 05/27/2022 0 (A)    BUN 05/27/2022 30 (A)    Creatinine 05/27/2022 1 14     Glucose, Fasting 05/27/2022 179 (A)    Calcium 05/27/2022 10 0     Corrected Calcium 05/27/2022 10 5 (A)    AST 05/27/2022 21     ALT 05/27/2022 31     Alkaline Phosphatase 05/27/2022 132 (A)    Total Protein 05/27/2022 8 6 (A)    Albumin 05/27/2022 3 4 (A)    Total Bilirubin 05/27/2022 0 40     eGFR 05/27/2022 67          Radiology Results:   No results found

## 2022-09-19 NOTE — PATIENT INSTRUCTIONS
Start taking pepcid at night  Start Miralax - 1 capful daily and titrate up or down as needed  EGD and colonoscopy on Oct 25th  Schedule ultrasound  Consider gastric emptying study  Pt amrit for colon and egd on 10/25 mirlax prep given follow up made

## 2022-09-20 DIAGNOSIS — E11.65 TYPE 2 DIABETES MELLITUS WITH HYPERGLYCEMIA, WITH LONG-TERM CURRENT USE OF INSULIN (HCC): ICD-10-CM

## 2022-09-20 DIAGNOSIS — Z79.4 TYPE 2 DIABETES MELLITUS WITH HYPERGLYCEMIA, WITH LONG-TERM CURRENT USE OF INSULIN (HCC): ICD-10-CM

## 2022-09-20 RX ORDER — FLASH GLUCOSE SENSOR
KIT MISCELLANEOUS
Qty: 6 EACH | Refills: 2 | Status: SHIPPED | OUTPATIENT
Start: 2022-09-20

## 2022-09-23 ENCOUNTER — TELEPHONE (OUTPATIENT)
Dept: ENDOCRINOLOGY | Facility: CLINIC | Age: 62
End: 2022-09-23

## 2022-09-28 ENCOUNTER — OFFICE VISIT (OUTPATIENT)
Dept: PODIATRY | Facility: CLINIC | Age: 62
End: 2022-09-28
Payer: COMMERCIAL

## 2022-09-28 VITALS
SYSTOLIC BLOOD PRESSURE: 116 MMHG | HEIGHT: 68 IN | WEIGHT: 180 LBS | BODY MASS INDEX: 27.28 KG/M2 | DIASTOLIC BLOOD PRESSURE: 60 MMHG | OXYGEN SATURATION: 96 % | HEART RATE: 94 BPM

## 2022-09-28 DIAGNOSIS — E11.49 OTHER DIABETIC NEUROLOGICAL COMPLICATION ASSOCIATED WITH TYPE 2 DIABETES MELLITUS (HCC): Primary | ICD-10-CM

## 2022-09-28 DIAGNOSIS — B35.1 ONYCHOMYCOSIS: ICD-10-CM

## 2022-09-28 DIAGNOSIS — R11.11 VOMITING WITHOUT NAUSEA, UNSPECIFIED VOMITING TYPE: ICD-10-CM

## 2022-09-28 PROCEDURE — 3074F SYST BP LT 130 MM HG: CPT | Performed by: STUDENT IN AN ORGANIZED HEALTH CARE EDUCATION/TRAINING PROGRAM

## 2022-09-28 PROCEDURE — 3078F DIAST BP <80 MM HG: CPT | Performed by: STUDENT IN AN ORGANIZED HEALTH CARE EDUCATION/TRAINING PROGRAM

## 2022-09-28 PROCEDURE — 99203 OFFICE O/P NEW LOW 30 MIN: CPT | Performed by: STUDENT IN AN ORGANIZED HEALTH CARE EDUCATION/TRAINING PROGRAM

## 2022-09-28 PROCEDURE — 11720 DEBRIDE NAIL 1-5: CPT | Performed by: STUDENT IN AN ORGANIZED HEALTH CARE EDUCATION/TRAINING PROGRAM

## 2022-09-28 RX ORDER — ONDANSETRON 4 MG/1
TABLET, ORALLY DISINTEGRATING ORAL
Qty: 40 TABLET | Refills: 0 | Status: SHIPPED | OUTPATIENT
Start: 2022-09-28

## 2022-09-29 NOTE — PROGRESS NOTES
Assessment/Plan:    No problem-specific Assessment & Plan notes found for this encounter  Diagnoses and all orders for this visit:    Other diabetic neurological complication associated with type 2 diabetes mellitus ([facility] )    Onychomycosis      Plan:     1  A thorough neurovascular exam was performed  Patient presents for at-risk foot care  Patient has no acute concerns today  Patient has significant lower extremity risk due to neuropathy, parasthesia, edema, and trophic skin changes to the lower extremity  Patient has Q7  findings and is recommended for at risk foot care every 3 months  2  All 5 toenails were debridement as follow: using nail nipper, linda, and curette, nails were sharply debrided, reduced in thickness and length  Devitalized nail tissue and fungal debris excised and removed  Patient tolerated well  3  Patient was educated on importance of glycemic control, daily foot assessment and proper shoe gear  Educational materials were provided  4  Call if any questions or occurrence of any foot and ankle related complications  Discussed proper offloading of left heel when sleeping in bed with multi Podus boot or placing 2-3 pillows underneath to prevent any preulcerative heel lesions  Recommended to apply Betadine to the eschar on the left hallux with continuous monitoring  If this is to get infected or worse they are to call my office immediately or visit nearby ER  5  Return in 10-12 weeks  Lab Results   Component Value Date    HGBA1C 8 1 (H) 05/25/2022       Subjective:      Patient ID: Donald Randhawa is a 57 y o  male  HPI:  Donald Randhawa is a 57 y o  male who presents with painful, elongated toenails and small scab on the left hallux  They have difficulty applying their socks and shoes due to the elongation of the nails  The pressure within their shoe gear is painful and they have been unable to cut their nails adequately  Patient states pain is 1/10 in shoe gear  Pain with pressure  Requires at risk foot care  Patient has a history of AKA on the right  He is type 2 diabetic with most recent A1c of 8 1%  The following portions of the patient's history were reviewed and updated as appropriate:   He  has a past medical history of Arthritis, Diabetes mellitus ([place] [address] ), GERD (gastroesophageal reflux disease), Hypercholesteremia, Hypertension, Ischemia of right lower extremity with suspected rhabdomyolysis (2/6/2021), Left Hydropneumothorax (2/10/2021), Methadone use, Pneumonia due to COVID-19 virus (1/11/2021), and Subacute osteomyelitis of right femur ([place] [address] ) (9/13/2021)    He   Patient Active Problem List    Diagnosis Date Noted    PAD (peripheral artery disease) ([place] [address] ) 07/27/2022    Foot drop, left 12/22/2021    History of COVID-19 11/04/2021    Pulmonary fibrosis ([place] [address] ) 11/04/2021    Depression 09/22/2021    Chronic hyponatremia 09/14/2021    Chronic pain syndrome 07/23/2021    Above-knee amputation of right lower extremity ([place] [address] ) 05/11/2021    Cervical radiculopathy 04/26/2021    Chronic bilateral low back pain 04/26/2021    Moderate protein-calorie malnutrition ([place] [address] ) 03/12/2021    S/P AKA (above knee amputation), right ([place] [address] ) 03/11/2021    Drug-induced constipation 03/08/2021    Empyema lung ([place] [address] ) 03/01/2021    Prolonged Q-T interval on ECG 02/17/2021    Aortic thrombus ([place] [address] ) 01/13/2021    Type 2 diabetes mellitus, with long-term current use of insulin ([place] [address] ) 01/11/2021    Acute respiratory failure with hypoxia ([place] [address] ) 01/11/2021    Hematuria 01/11/2021    Vitamin D deficiency 12/05/2019    Mononeuropathy, unspecified 11/07/2019    Mixed hyperlipidemia 11/07/2019    Proteinuria 11/07/2019    Positive depression screening 08/08/2019    Bilateral lower extremity edema 11/27/2018    Diabetic ulcer of left heel associated with type 2 diabetes mellitus, limited to breakdown of skin ([facility] ) 10/30/2018    Essential hypertension 04/13/2017    Hyperlipidemia associated with type 2 diabetes mellitus ([facility] ) 04/13/2017    Methadone maintenance therapy patient ([place] [address] ) 04/13/2017    Type 2 diabetes mellitus with diabetic polyneuropathy ([place] [address] ) 04/13/2017    Chronic hepatitis C without hepatic coma ([place] [address] ) 02/27/2017     He  has a past surgical history that includes Thrombectomy w/ embolectomy (Right, 1/13/2021); ARTERIOGRAM (Right, 1/13/2021); AMPUTATION ABOVE KNEE (AKA) (Right, 1/14/2021); AMPUTATION ABOVE KNEE (AKA) (Right, 1/18/2021); IR lower extremity angiogram (1/14/2021); and IR chest tube placement (2/10/2021)       Review of Systems   Constitutional: Negative for chills  Respiratory: Negative for shortness of breath  Gastrointestinal: Negative for diarrhea  Musculoskeletal: Negative for arthralgias  Skin: Positive for color change  Neurological: Negative for dizziness  Psychiatric/Behavioral: Negative for agitation  Objective:      /60 (BP Location: Right arm, Patient Position: Sitting, Cuff Size: Standard)   Pulse 94   Ht 5' 8" (1 727 m)   Wt 81 6 kg (180 lb) Comment: verbal, wheelchair  SpO2 96%   BMI 27 37 kg/m²          Physical Exam  Vitals reviewed  Cardiovascular:      Rate and Rhythm: Normal rate  Pulses: Pulses are weak  Dorsalis pedis pulses are 0 on the left side  Posterior tibial pulses are 0 on the left side  Musculoskeletal:         General: Normal range of motion  Right Lower Extremity: (AKA)  Feet:      Right foot: amputated     Left foot:      Protective Sensation: 10 sites tested  2 sites sensed  Skin integrity: Dry skin present  Toenail Condition: Left toenails are abnormally thick and long  Fungal disease present  Comments: On exam patient has thickened, hypertrophic, discolored, brittle toenails with subungual debris and tenderness x5  Stable left hallux dorsal small scab    Patient has lower extremity edema  PAtients skin is atrophic, thickened nails, and decreased pedal hair  Patient has decreased pinprick and vibratory sensation to his feet and parasthesia    Skin:     General: Skin is warm  Neurological:      General: No focal deficit present  Mental Status: He is alert  Psychiatric:         Mood and Affect: Mood normal        Diabetic Foot Exam    Patient's shoes and socks removed  Right Foot/Ankle   Right Foot Inspection  Amputation: amputation right foot (Comments: AKA)    Left Foot/Ankle  Left Foot Inspection  Skin Exam: dry skin  Toe Exam: left toe deformity  Sensory   Vibration: diminished  Proprioception: diminished  Monofilament testing: diminished    Vascular  The left DP pulse is 0  The left PT pulse is 0       Assign Risk Category  Deformity present  Loss of protective sensation  Weak pulses  Risk: 3

## 2022-10-03 DIAGNOSIS — Z79.4 TYPE 2 DIABETES MELLITUS WITH DIABETIC POLYNEUROPATHY, WITH LONG-TERM CURRENT USE OF INSULIN (HCC): ICD-10-CM

## 2022-10-03 DIAGNOSIS — E11.42 TYPE 2 DIABETES MELLITUS WITH DIABETIC POLYNEUROPATHY, WITH LONG-TERM CURRENT USE OF INSULIN (HCC): ICD-10-CM

## 2022-10-03 RX ORDER — EMPAGLIFLOZIN 25 MG/1
TABLET, FILM COATED ORAL
Qty: 30 TABLET | Refills: 0 | Status: SHIPPED | OUTPATIENT
Start: 2022-10-03

## 2022-10-10 ENCOUNTER — TELEPHONE (OUTPATIENT)
Dept: PODIATRY | Facility: CLINIC | Age: 62
End: 2022-10-10

## 2022-10-10 ENCOUNTER — APPOINTMENT (OUTPATIENT)
Dept: RADIOLOGY | Facility: HOSPITAL | Age: 62
End: 2022-10-10
Payer: COMMERCIAL

## 2022-10-10 ENCOUNTER — HOSPITAL ENCOUNTER (EMERGENCY)
Facility: HOSPITAL | Age: 62
Discharge: HOME | End: 2022-10-10
Attending: EMERGENCY MEDICINE
Payer: COMMERCIAL

## 2022-10-10 ENCOUNTER — TELEPHONE (OUTPATIENT)
Dept: FAMILY MEDICINE CLINIC | Facility: CLINIC | Age: 62
End: 2022-10-10

## 2022-10-10 VITALS
TEMPERATURE: 98.3 F | SYSTOLIC BLOOD PRESSURE: 117 MMHG | RESPIRATION RATE: 18 BRPM | HEART RATE: 98 BPM | DIASTOLIC BLOOD PRESSURE: 69 MMHG | OXYGEN SATURATION: 97 %

## 2022-10-10 DIAGNOSIS — Z51.89 VISIT FOR WOUND CHECK: ICD-10-CM

## 2022-10-10 DIAGNOSIS — L08.9 TOE INFECTION: Primary | ICD-10-CM

## 2022-10-10 LAB
ALBUMIN SERPL BCP-MCNC: 4.5 G/DL (ref 3.5–5)
ALP SERPL-CCNC: 118 U/L (ref 34–104)
ALT SERPL W P-5'-P-CCNC: 21 U/L (ref 7–52)
ANION GAP SERPL CALCULATED.3IONS-SCNC: 10 MMOL/L (ref 4–13)
AST SERPL W P-5'-P-CCNC: 22 U/L (ref 13–39)
BASOPHILS # BLD AUTO: 0.1 THOUSANDS/ÂΜL (ref 0–0.1)
BASOPHILS NFR BLD AUTO: 1 % (ref 0–1)
BILIRUB SERPL-MCNC: 0.59 MG/DL (ref 0.2–1)
BUN SERPL-MCNC: 18 MG/DL (ref 5–25)
CALCIUM SERPL-MCNC: 10.3 MG/DL (ref 8.4–10.2)
CHLORIDE SERPL-SCNC: 97 MMOL/L (ref 96–108)
CO2 SERPL-SCNC: 27 MMOL/L (ref 21–32)
CREAT SERPL-MCNC: 0.97 MG/DL (ref 0.6–1.3)
EOSINOPHIL # BLD AUTO: 0.66 THOUSAND/ÂΜL (ref 0–0.61)
EOSINOPHIL NFR BLD AUTO: 6 % (ref 0–6)
ERYTHROCYTE [DISTWIDTH] IN BLOOD BY AUTOMATED COUNT: 13.7 % (ref 11.6–15.1)
GFR SERPL CREATININE-BSD FRML MDRD: 82 ML/MIN/1.73SQ M
GLUCOSE SERPL-MCNC: 165 MG/DL (ref 65–140)
HCT VFR BLD AUTO: 49.2 % (ref 36.5–49.3)
HGB BLD-MCNC: 15.7 G/DL (ref 12–17)
IMM GRANULOCYTES # BLD AUTO: 0.03 THOUSAND/UL (ref 0–0.2)
IMM GRANULOCYTES NFR BLD AUTO: 0 % (ref 0–2)
LYMPHOCYTES # BLD AUTO: 2.96 THOUSANDS/ÂΜL (ref 0.6–4.47)
LYMPHOCYTES NFR BLD AUTO: 25 % (ref 14–44)
MCH RBC QN AUTO: 27.9 PG (ref 26.8–34.3)
MCHC RBC AUTO-ENTMCNC: 31.9 G/DL (ref 31.4–37.4)
MCV RBC AUTO: 87 FL (ref 82–98)
MONOCYTES # BLD AUTO: 0.76 THOUSAND/ÂΜL (ref 0.17–1.22)
MONOCYTES NFR BLD AUTO: 6 % (ref 4–12)
NEUTROPHILS # BLD AUTO: 7.34 THOUSANDS/ÂΜL (ref 1.85–7.62)
NEUTS SEG NFR BLD AUTO: 62 % (ref 43–75)
NRBC BLD AUTO-RTO: 0 /100 WBCS
PLATELET # BLD AUTO: 254 THOUSANDS/UL (ref 149–390)
PMV BLD AUTO: 11.2 FL (ref 8.9–12.7)
POTASSIUM SERPL-SCNC: 4.2 MMOL/L (ref 3.5–5.3)
PROT SERPL-MCNC: 9.2 G/DL (ref 6.4–8.4)
RBC # BLD AUTO: 5.63 MILLION/UL (ref 3.88–5.62)
SODIUM SERPL-SCNC: 134 MMOL/L (ref 135–147)
WBC # BLD AUTO: 11.85 THOUSAND/UL (ref 4.31–10.16)

## 2022-10-10 PROCEDURE — 99285 EMERGENCY DEPT VISIT HI MDM: CPT | Performed by: EMERGENCY MEDICINE

## 2022-10-10 PROCEDURE — 36415 COLL VENOUS BLD VENIPUNCTURE: CPT

## 2022-10-10 PROCEDURE — 73660 X-RAY EXAM OF TOE(S): CPT

## 2022-10-10 PROCEDURE — 85025 COMPLETE CBC W/AUTO DIFF WBC: CPT

## 2022-10-10 PROCEDURE — 80053 COMPREHEN METABOLIC PANEL: CPT

## 2022-10-10 PROCEDURE — 99283 EMERGENCY DEPT VISIT LOW MDM: CPT

## 2022-10-10 RX ORDER — DOXYCYCLINE HYCLATE 100 MG/1
100 CAPSULE ORAL ONCE
Status: COMPLETED | OUTPATIENT
Start: 2022-10-10 | End: 2022-10-10

## 2022-10-10 RX ORDER — DOXYCYCLINE HYCLATE 100 MG/1
100 CAPSULE ORAL 2 TIMES DAILY
Qty: 28 CAPSULE | Refills: 0 | Status: SHIPPED | OUTPATIENT
Start: 2022-10-10 | End: 2022-10-24

## 2022-10-10 RX ADMIN — DOXYCYCLINE 100 MG: 100 CAPSULE ORAL at 17:57

## 2022-10-10 NOTE — TELEPHONE ENCOUNTER
Pts nephew was notified and was able to get him into podiatry but is going to take him to the er today and follow up with podaitry tomorrow as well

## 2022-10-10 NOTE — DISCHARGE INSTRUCTIONS
Please continue with your scheduled follow-up visit with Podiatry tomorrow  Start taking doxycycline for suspected toe infection  If you develop any new, concerning, worsening symptoms please return to the emergency department for re-evaluation

## 2022-10-10 NOTE — TELEPHONE ENCOUNTER
He should go to podiatry but if he can not get in there quickly, come here for an appointment or go to the ER if needed

## 2022-10-10 NOTE — ED PROVIDER NOTES
History  Chief Complaint   Patient presents with   • Wound Check     Pt reports he has a wound to the left great toe and believes it could be infected  76-year-old male with insulin-dependent diabetes and right above knee amputation presents to the emergency department for evaluation of possible toe infection of left great toe  Patient reports that last week he started developing a very small pinpoint ulcer to the medial aspect of the left great toe that has gradually worsened over the past week  He did see his podiatrist in the office last week and stated to keep an eye on it  Approximately 2-3 days ago, he noticed that the surrounding tissue became red and inflamed  The redness has been spreading over the past 2 days  He did call his podiatrist who was unable to get him in today but did schedule an appointment for 11:00 a m  tomorrow morning  Denies headache, chest pain, shortness a breath, abdominal pain, radiating pain, fevers, chills  History provided by:  Patient   used: No    Toe Pain  Location:  Left great toe  Quality:  Redness, swelling, nonhealing sore  Severity:  Mild  Onset quality:  Sudden  Duration:  3 days  Timing:  Constant  Progression:  Worsening  Chronicity:  New  Context:  Type 2 diabetic insulin dependent, chronic neuropathy  Relieved by:  None tried  Worsened by:  Nothing  Associated symptoms: no abdominal pain, no chest pain, no cough, no diarrhea, no ear pain, no fever, no headaches, no loss of consciousness, no nausea, no rash, no shortness of breath, no sore throat and no vomiting        Prior to Admission Medications   Prescriptions Last Dose Informant Patient Reported? Taking?    BinaxNOW COVID-19 Ag Home Test KIT   Yes No   Patient not taking: No sig reported   Blood Glucose Monitoring Suppl (OneTouch Verio) w/Device KIT  Self No No   Sig: Use to test blood sugars 3 times daily   Continuous Blood Gluc  ([address] [place]) JOSEPH  Self No No   Sig: Use as directed   Continuous Blood Gluc Sensor (FreeStyle Ke 2 Sensor) MISC   No No   Sig: Change every 14 days   DULoxetine (CYMBALTA) 60 mg delayed release capsule   No No   Sig: Take 1 capsule (60 mg total) by mouth in the morning   Diclofenac Sodium (VOLTAREN) 1 %   No No   Sig: Apply 4 g topically 4 (four) times a day   Patient taking differently: Apply 4 g topically 4 (four) times a day as needed   Eliquis 5 MG  Self No No   Sig: Take 1 tablet by mouth twice daily   Insulin Pen Needle 31G X 5 MM MISC  Self No No   Sig: Use daily Pt to inject 4 X daily   Insulin Pen Needle 31G X 5 MM MISC  Self No No   Si units sq 2X daily   Insulin Syringe-Needle U-100 (BD Veo Insulin Syringe U/F) 31G X 15/64" 0 5 ML MISC   No No   Sig: Inject under the skin 3 (three) times a day Use as directed   Janumet  MG per tablet  Self No No   Sig: TAKE 1 TABLET BY MOUTH TWICE DAILY WITH MEALS   Jardiance 25 MG TABS   No No   Sig: TAKE 1 TABLET BY MOUTH ONCE DAILY IN THE MORNING   acetaminophen (TYLENOL) 325 mg tablet   No No   Sig: Take 3 tablets (975 mg total) by mouth every 8 (eight) hours   Patient taking differently: Take 650 mg by mouth 3 (three) times a day as needed for moderate pain   albuterol (PROVENTIL HFA,VENTOLIN HFA) 90 mcg/act inhaler  Self No No   Sig: Inhale 2 puffs every 4 (four) hours as needed for wheezing   aspirin 81 mg chewable tablet   Yes No   Sig: Chew 81 mg daily   atorvastatin (LIPITOR) 20 mg tablet  Self No No   Sig: Take 1 tablet (20 mg total) by mouth daily   bisacodyl (DULCOLAX) 5 mg EC tablet  Self No No   Sig: Take as instructed by GI office for bowel prep for colonoscopy   cholecalciferol (VITAMIN D3) 1,000 units tablet  Self No No   Sig: Take 2 tablets (2,000 Units total) by mouth daily   famotidine (PEPCID) 20 mg tablet   No No   Sig: Take 1 tablet (20 mg total) by mouth 2 (two) times a day   fluticasone (FLONASE) 50 mcg/act nasal spray   No No   Si spray into each nostril daily   Patient taking differently: 1 spray into each nostril if needed   glucose blood test strip  Self No No   Sig: Use 1 each 3 (three) times daily after meals Use as instructed   insulin glargine (LANTUS) 100 units/mL subcutaneous injection  Self No No   Sig: Inject 27 Units under the skin daily at bedtime   insulin lispro (HumaLOG KwikPen) 100 units/mL injection pen  Self No No   Sig: Blood Glucose 150 - 224: 4 Unit of Insulin Blood Glucose 225 - 299: 5 Units of Insulin Blood Glucose 300 - 374: 6 Units of Insulin Blood Glucose 375 - 449: 7 Units of Insulin Blood Glucose greater than or equal to 450: 8 Units of Insulin Max of 24 units per day   levocetirizine (XYZAL) 5 MG tablet  Self No No   Sig: TAKE 1 TABLET BY MOUTH ONCE DAILY IN THE EVENING   lidocaine (LMX) 4 % cream  Self No No   Sig: Apply topically 4 (four) times a day as needed for mild pain   lisinopril (ZESTRIL) 10 mg tablet   No No   Sig: Take 1 tablet by mouth once daily   magnesium oxide (MAG-OX) 400 mg  Self No No   Sig: Take 1 tablet (400 mg total) by mouth 2 (two) times a day   menthol-methyl salicylate (BENGAY) 90-16 % cream  Self No No   Sig: Apply topically 4 (four) times a day as needed (torticollis)   methadone (DOLOPHINE) 10 mg tablet  Self Yes No   Sig: Take 70 mg by mouth daily,   multivitamin-minerals (CENTRUM ADULTS) tablet  Self No No   Sig: Take 1 tablet by mouth daily   naloxone (NARCAN) 4 mg/0 1 mL nasal spray  Self No No   Sig: Administer 1 spray into a nostril  If no response after 2-3 minutes, give another dose in the other nostril using a new spray     nicotine (NICODERM CQ) 14 mg/24hr TD 24 hr patch   No No   Sig: Place 1 patch on the skin every 24 hours   ondansetron (ZOFRAN-ODT) 4 mg disintegrating tablet   No No   Sig: DISSOLVE 1 TABLET IN MOUTH EVERY 6 HOURS AS NEEDED FOR NAUSEA FOR VOMITING   pantoprazole (PROTONIX) 40 mg tablet  Self No No   Sig: Take 1 tablet (40 mg total) by mouth daily   polyethylene glycol (GOLYTELY) 4000 mL solution  Self No No   Sig: Take as instructed by GI office for bowel prep   silver sulfadiazine (SILVADENE,SSD) 1 % cream  Self No No   Sig: Apply topically daily Apply as directed to left heel ulcer daily with each dressing change  Facility-Administered Medications: None       Past Medical History:   Diagnosis Date   • Arthritis    • Diabetes mellitus ([facility] [address] )    • GERD (gastroesophageal reflux disease)    • Hypercholesteremia    • Hypertension    • Ischemia of right lower extremity with suspected rhabdomyolysis 2/6/2021   • Left Hydropneumothorax 2/10/2021   • Methadone use    • Pneumonia due to COVID-19 virus 1/11/2021   • Subacute osteomyelitis of right femur ([facility] [address] ) 9/13/2021       Past Surgical History:   Procedure Laterality Date   • 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    Procedure: AMPUTATION ABOVE KNEE (AKA); Surgeon: Nelda Arguello MD;  [facility];  Service: Vascular   • AMPUTATION ABOVE KNEE (AKA) Right 1/18/2021    Procedure: AMPUTATION ABOVE KNEE (AKA) FORMALIZATION,  R AKA wound washout, wound closure;  Surgeon: Nelda Arguello MD;  [facility];  Service: Vascular   • ARTERIOGRAM Right 1/13/2021    Procedure: ARTERIOGRAM;  Surgeon: Peng Gusman DO;  [facility];  Service: Vascular   • IR CHEST TUBE PLACEMENT  2/10/2021   • IR LOWER EXTREMITY ANGIOGRAM  1/14/2021   • THROMBECTOMY W/ EMBOLECTOMY Right 1/13/2021    Procedure: EMBOLECTOMY/THROMBECTOMY LOWER EXTREMITY;  Surgeon: Peng Gusman DO;  [facility];  Service: Vascular       Family History   Problem Relation Age of Onset   • Diabetes Mother    • Hypertension Father    • Leukemia Father    • Acute lymphoblastic leukemia Father    • Cancer Sister      I have reviewed and agree with the history as documented      E-Cigarette/Vaping   • E-Cigarette Use Never User      E-Cigarette/Vaping Substances   • Nicotine No    • THC No    • CBD No    • Flavoring No    • Other No    • Unknown No      Social History     Tobacco Use   • Smoking status: Current Every Day Smoker     Packs/day: 0 50     Types: Cigarettes   • Smokeless tobacco: Never Used   Vaping Use   • Vaping Use: Never used   Substance Use Topics   • Alcohol use: Not Currently   • Drug use: No     Comment: methadone       Review of Systems   Constitutional: Negative for chills and fever  HENT: Negative for ear pain and sore throat  Eyes: Negative for pain and visual disturbance  Respiratory: Negative for cough and shortness of breath  Cardiovascular: Negative for chest pain and palpitations  Gastrointestinal: Negative for abdominal pain, diarrhea, nausea and vomiting  Genitourinary: Negative for dysuria and hematuria  Musculoskeletal: Negative for arthralgias and back pain  Skin: Positive for wound  Negative for color change and rash  Neurological: Negative for seizures, loss of consciousness, syncope and headaches  Psychiatric/Behavioral: Negative for confusion  All other systems reviewed and are negative  Physical Exam  Physical Exam  Vitals and nursing note reviewed  Constitutional:       General: He is not in acute distress  Appearance: Normal appearance  He is well-developed and normal weight  He is not ill-appearing  HENT:      Head: Normocephalic and atraumatic  Right Ear: External ear normal       Left Ear: External ear normal       Nose: Nose normal       Mouth/Throat:      Mouth: Mucous membranes are moist       Pharynx: Oropharynx is clear  Eyes:      General: No scleral icterus  Right eye: No discharge  Left eye: No discharge  Conjunctiva/sclera: Conjunctivae normal       Pupils: Pupils are equal, round, and reactive to light  Cardiovascular:      Rate and Rhythm: Normal rate  Pulses: Normal pulses  Heart sounds: Normal heart sounds  No murmur heard  Pulmonary:      Effort: Pulmonary effort is normal  No respiratory distress        Breath sounds: Normal breath sounds  Chest:      Chest wall: No tenderness  Abdominal:      General: Abdomen is flat  Palpations: Abdomen is soft  Tenderness: There is no abdominal tenderness  There is no right CVA tenderness or left CVA tenderness  Musculoskeletal:         General: Swelling present  No tenderness or signs of injury  Normal range of motion  Cervical back: Normal range of motion and neck supple  No rigidity  Legs:         Feet:    Skin:     General: Skin is warm and dry  Capillary Refill: Capillary refill takes less than 2 seconds  Findings: Erythema present  No bruising or rash  Neurological:      General: No focal deficit present  Mental Status: He is alert and oriented to person, place, and time  Mental status is at baseline  Motor: No weakness  Psychiatric:         Mood and Affect: Mood normal          Behavior: Behavior normal          Thought Content:  Thought content normal          Vital Signs  ED Triage Vitals [10/10/22 1628]   Temperature Pulse Respirations Blood Pressure SpO2   98 2 °F (36 8 °C) (!) 115 18 117/69 97 %      Temp Source Heart Rate Source Patient Position - Orthostatic VS BP Location FiO2 (%)   Tympanic -- -- Right arm --      Pain Score       --           Vitals:    10/10/22 1628 10/10/22 1658 10/10/22 1806   BP: 117/69     Pulse: (!) 115 (!) 106 98         Visual Acuity      ED Medications  Medications   doxycycline hyclate (VIBRAMYCIN) capsule 100 mg (100 mg Oral Given 10/10/22 1757)       Diagnostic Studies  Results Reviewed     Procedure Component Value Units Date/Time    Comprehensive metabolic panel [[id number]]  (Abnormal) Collected: 10/10/22 1718    Lab Status: Final result Specimen: Blood from Arm, Right Updated: 10/10/22 1747     Sodium 134 mmol/L      Potassium 4 2 mmol/L      Chloride 97 mmol/L      CO2 27 mmol/L      ANION GAP 10 mmol/L      BUN 18 mg/dL      Creatinine 0 97 mg/dL      Glucose 165 mg/dL      Calcium 10 3 mg/dL      AST 22 U/L      ALT 21 U/L      Alkaline Phosphatase 118 U/L      Total Protein 9 2 g/dL      Albumin 4 5 g/dL      Total Bilirubin 0 59 mg/dL      eGFR 82 ml/min/1 73sq m     Narrative:      Laya guidelines for Chronic Kidney Disease (CKD):   •  Stage 1 with normal or high GFR (GFR > 90 mL/min/1 73 square meters)  •  Stage 2 Mild CKD (GFR = 60-89 mL/min/1 73 square meters)  •  Stage 3A Moderate CKD (GFR = 45-59 mL/min/1 73 square meters)  •  Stage 3B Moderate CKD (GFR = 30-44 mL/min/1 73 square meters)  •  Stage 4 Severe CKD (GFR = 15-29 mL/min/1 73 square meters)  •  Stage 5 End Stage CKD (GFR <15 mL/min/1 73 square meters)  Note: GFR calculation is accurate only with a steady state creatinine    CBC and differential [[id number]]  (Abnormal) Collected: 10/10/22 1718    Lab Status: Final result Specimen: Blood from Arm, Right Updated: 10/10/22 1734     WBC 11 85 Thousand/uL      RBC 5 63 Million/uL      Hemoglobin 15 7 g/dL      Hematocrit 49 2 %      MCV 87 fL      MCH 27 9 pg      MCHC 31 9 g/dL      RDW 13 7 %      MPV 11 2 fL      Platelets 945 Thousands/uL      nRBC 0 /100 WBCs      Neutrophils Relative 62 %      Immat GRANS % 0 %      Lymphocytes Relative 25 %      Monocytes Relative 6 %      Eosinophils Relative 6 %      Basophils Relative 1 %      Neutrophils Absolute 7 34 Thousands/µL      Immature Grans Absolute 0 03 Thousand/uL      Lymphocytes Absolute 2 96 Thousands/µL      Monocytes Absolute 0 76 Thousand/µL      Eosinophils Absolute 0 66 Thousand/µL      Basophils Absolute 0 10 Thousands/µL                  XR toe great min 2 views LEFT    (Results Pending)              Procedures  Procedures         ED Course  ED Course as of 10/10/22 1820   Mon Oct 10, 2022   1700 Discussing his with Podiatry regarding decision to workup for OM and admit    Patient appears clinically well but does have elevated heart rate of 115      1702 [id number] ([place] [facility]))  [facility] [place]    Meredith Sims could you get an XR, oral abx and then I'll see him tomorrow?  5:01 PM  20 days left                                             MDM  Number of Diagnoses or Management Options  Toe infection: new and requires workup  Visit for wound check: new and requires workup  Diagnosis management comments: 42-year-old male with insulin-dependent diabetes and chronic neuropathy presenting to the emergency department for evaluation of possible left great toe infection  Symptoms have been ongoing for 2 days  Patient is afebrile  Patient is tachycardic on arrival however patient states that he has moderately anxious  Patient is clinically well-appearing, in no acute distress  There is suspicion for infection of the left great toe with swelling, erythema, warmth to touch however it is relatively mild  Discussed case with Dr Monique Pete in [address] [place] [address]  He and I both agreed for basic blood work to assess white count and x-ray to evaluate for possible osteomyelitis  Low suspicion for OM as symptoms have been going on for 2 days  Patient has appropriate follow-up with Podiatry tomorrow  If white count stable will plan for discharge and oral antibiotics and to continue with scheduled Podiatry follow-up tomorrow morning  White count found to be 11 85  Discussed this with Dr Monique Pete  Him and I agreed for outpatient management and follow-up tomorrow  Discussed this plan with the patient and son who verbalized understanding agreeable with the plan  Very strict return precautions discussed  Gave patient 1st dose of doxycycline in the emergency department and sent the course to patient's pharmacy for him to start taking tomorrow  Patient discharged in stable condition         Amount and/or Complexity of Data Reviewed  Clinical lab tests: ordered and reviewed  Tests in the radiology section of CPT®: ordered and reviewed  Obtain history from someone other than the patient: yes (Son)  Review and summarize past medical records: yes  Discuss the patient with other providers: yes  Independent visualization of images, tracings, or specimens: yes    Patient Progress  Patient progress: stable      Disposition  Final diagnoses: Toe infection   Visit for wound check     Time reflects when diagnosis was documented in both MDM as applicable and the Disposition within this note     Time User Action Codes Description Comment    10/10/2022  5:49 PM John Amis Add [L08 9] Toe infection     10/10/2022  5:49 PM John Amis Add [Z51 89] Visit for wound check       ED Disposition     ED Disposition   Discharge    Condition   Stable    Date/Time   Mon Oct 10, 2022  5:48 PM    Comment   [address] [facility] [place],9D discharge to home/self care  Follow-up Information     Follow up With Specialties Details Why Contact Info [facility] [address], [address] [place] [address], Nurse Practitioner Call in 1 week follow up for further evaluation of symptoms [address] [facility]  [address] [address]  [phone]       [facility] Emergency Department Emergency Medicine Go to  If symptoms worsen [address]  [place] [id number]  [facility] Emergency Department, [address] [facility] Arpit Ruiz, [address] [facility], [facility], [address] [place] [address] in 1 day follow up for further evaluation of symptoms [address] [place] [address]  [phone]             Patient's Medications   Discharge Prescriptions    DOXYCYCLINE HYCLATE (VIBRAMYCIN) 100 MG CAPSULE    Take 1 capsule (100 mg total) by mouth 2 (two) times a day for 14 days       Start Date: 10/10/2022End Date: 10/24/2022       Order Dose: 100 mg       Quantity: 28 capsule    Refills: 0       No discharge procedures on file      PDMP Review       Value Time User    PDMP Reviewed  Yes 6/25/2021 12:29 PM Cande Dawn MD ED Provider  Electronically Signed by           Jorge L Billy PA-C  10/10/22 [phone]

## 2022-10-10 NOTE — TELEPHONE ENCOUNTER
Spoke with pts caregiver I recommended pt be seen in [facility]  They seemed undecided so I scheduled an office visit for tomorrow 10/11

## 2022-10-10 NOTE — TELEPHONE ENCOUNTER
Caller: Pat Avitia    Doctor/Office: Dr Deandre Galarza    #: [phone]    Reason for Escalation: calling as wound on toe has opened up over weekend/thinks infected/swollen/red/pain-please call back advise as to ER or to be seen at office ASAP/I have nothing to give pt  ASAP

## 2022-10-10 NOTE — TELEPHONE ENCOUNTER
Patient's nephew called and stated he believes patient has an infection on his toe  Noticed last night that it had started to swell up and is now warm to the touch   They wanted to know if you would like to see him or for him to go to the Podiatrist

## 2022-10-10 NOTE — ED ATTENDING ATTESTATION
10/10/2022  I, [address] [place] [address] DO, saw and evaluated the patient  I have discussed the patient with the resident/non-physician practitioner and agree with the resident's/non-physician practitioner's findings, Plan of Care, and MDM as documented in the resident's/non-physician practitioner's note, except where noted  All available labs and Radiology studies were reviewed  I was present for key portions of any procedure(s) performed by the resident/non-physician practitioner and I was immediately available to provide assistance  At this point I agree with the current assessment done in the Emergency Department  I have conducted an independent evaluation of this patient a history and physical is as follows:    ED Course         Critical Care Time  Procedures    28-year-old male presents the emergency department with a 4 day history of left toe discomfort feels that it might be infected, baseline labs drawn  Patient does have established relationship with Dr Ely Davis from Podiatry, patient does have an appointment with that provider tomorrow  History chronic hepatitis-C, right-sided AKA, essential hypertension, mild depression, history of COPD  Images taken by physician assistant, antibiotics will be ordered after consultation with Dr Robert Mclaughlin, no streaking up foot  Portions of the record may have been created with voice recognition software  Occasional wrong word or "sound a like" substitutions may have occurred due to the inherent limitations of voice recognition software  Read the chart carefully and recognize, using context, where substitutions have occurred  Counseling: I had a detailed discussion with the patient and/or guardian regarding: the historical points, exam findings, and any diagnostic results supporting the discharge diagnosis, lab results, radiology results, discharge instructions reviewed with patient and/or family/caregiver and understanding was verbalized   Instructions given to return to the emergency department if symptoms worsen or persist, or if there are any questions or concerns that arise at home  This patient was examined during the Covid-19 pandemic, and appropriate PPE was employed as defined by OSHA to minimize exposure to the patient and to avoid spread in the event that I am an asymptomatic carrier  All efforts were made to avoid direct contact with the patient per CDC guidelines ("social distancing") unless otherwise necessary to rule out a medical emergency and/or to provide life-saving interventions  Donning and doffing of PPE was performed per recommended guidelines, and personal PPE was employed if /when institutional PPE was not readily available or was deemed to be less than the recommended as defined by OSHA

## 2022-10-11 ENCOUNTER — DOCUMENTATION (OUTPATIENT)
Dept: PAIN MEDICINE | Facility: CLINIC | Age: 62
End: 2022-10-11

## 2022-10-11 ENCOUNTER — OFFICE VISIT (OUTPATIENT)
Dept: PODIATRY | Facility: CLINIC | Age: 62
End: 2022-10-11
Payer: COMMERCIAL

## 2022-10-11 ENCOUNTER — HOSPITAL ENCOUNTER (OUTPATIENT)
Dept: NON INVASIVE DIAGNOSTICS | Facility: HOSPITAL | Age: 62
Discharge: HOME | End: 2022-10-11
Attending: STUDENT IN AN ORGANIZED HEALTH CARE EDUCATION/TRAINING PROGRAM
Payer: COMMERCIAL

## 2022-10-11 VITALS
HEART RATE: 96 BPM | DIASTOLIC BLOOD PRESSURE: 80 MMHG | BODY MASS INDEX: 27.28 KG/M2 | SYSTOLIC BLOOD PRESSURE: 136 MMHG | WEIGHT: 180 LBS | HEIGHT: 68 IN

## 2022-10-11 DIAGNOSIS — L97.522 DIABETIC ULCER OF TOE OF LEFT FOOT ASSOCIATED WITH TYPE 2 DIABETES MELLITUS, WITH FAT LAYER EXPOSED (HCC): Primary | ICD-10-CM

## 2022-10-11 DIAGNOSIS — L97.421 DIABETIC ULCER OF LEFT HEEL ASSOCIATED WITH TYPE 2 DIABETES MELLITUS, LIMITED TO BREAKDOWN OF SKIN (HCC): ICD-10-CM

## 2022-10-11 DIAGNOSIS — L97.421 DIABETIC ULCER OF LEFT HEEL ASSOCIATED WITH TYPE 2 DIABETES MELLITUS, LIMITED TO BREAKDOWN OF SKIN (HCC): Primary | ICD-10-CM

## 2022-10-11 DIAGNOSIS — E11.621 DIABETIC ULCER OF TOE OF LEFT FOOT ASSOCIATED WITH TYPE 2 DIABETES MELLITUS, WITH FAT LAYER EXPOSED (HCC): Primary | ICD-10-CM

## 2022-10-11 DIAGNOSIS — L03.032 CELLULITIS OF LEFT TOE: ICD-10-CM

## 2022-10-11 DIAGNOSIS — E11.621 DIABETIC ULCER OF LEFT HEEL ASSOCIATED WITH TYPE 2 DIABETES MELLITUS, LIMITED TO BREAKDOWN OF SKIN (HCC): Primary | ICD-10-CM

## 2022-10-11 DIAGNOSIS — E11.621 DIABETIC ULCER OF LEFT HEEL ASSOCIATED WITH TYPE 2 DIABETES MELLITUS, LIMITED TO BREAKDOWN OF SKIN (HCC): ICD-10-CM

## 2022-10-11 PROCEDURE — 99213 OFFICE O/P EST LOW 20 MIN: CPT | Performed by: STUDENT IN AN ORGANIZED HEALTH CARE EDUCATION/TRAINING PROGRAM

## 2022-10-11 PROCEDURE — 11042 DBRDMT SUBQ TIS 1ST 20SQCM/<: CPT | Performed by: STUDENT IN AN ORGANIZED HEALTH CARE EDUCATION/TRAINING PROGRAM

## 2022-10-11 PROCEDURE — 93926 LOWER EXTREMITY STUDY: CPT | Performed by: SURGERY

## 2022-10-11 PROCEDURE — 11045 DBRDMT SUBQ TISS EACH ADDL: CPT | Performed by: STUDENT IN AN ORGANIZED HEALTH CARE EDUCATION/TRAINING PROGRAM

## 2022-10-11 PROCEDURE — 93922 UPR/L XTREMITY ART 2 LEVELS: CPT | Performed by: SURGERY

## 2022-10-11 PROCEDURE — 93926 LOWER EXTREMITY STUDY: CPT

## 2022-10-11 NOTE — PROGRESS NOTES
Assessment/Plan:    No problem-specific Assessment & Plan notes found for this encounter  Diagnoses and all orders for this visit:    Diabetic ulcer of toe of left foot associated with type 2 diabetes mellitus, with fat layer exposed ([address] )  -     VAS reflux lower limb venous duplex study with reflux assesment, unilateral; Future  -     Post Op Shoe    Cellulitis of left toe      Plan:     -  Patient was counseled and educated on the condition and the diagnosis  The diagnosis, treatment options and prognosis were discussed in detail    - Left foot xrays reviewed: I personally interpreted the xray findings with patient which is consistent without any acute osseous abnormalities  - ER notes and labs reviewed reviewed  Patient did have mild increase in WBC consistent with cellulitis  Continue PO abx Doxy as prescribed  - Will obtain LEADs ASAP   - wound was debrided as below  Dressed with maxorb Ag and DSD    - Surgical shoe to the left foot  - Discussed importance of glycemic control and proper protein intake for wound healing    - return in 1 week      Debridement   Universal Protocol:  Consent: Verbal consent obtained    Risks and benefits: risks, benefits and alternatives were discussed  Consent given by: patient  Patient understanding: patient states understanding of the procedure being performed  Patient identity confirmed: verbally with patient      Performed by: physician  Debridement type: surgical  Level of debridement: subcutaneous tissue    Post-debridement measurements  Length (cm): 0 8  Width (cm): 0 7  Depth (cm): 0 2  Percent debrided: 100%  Surface Area (cm^2): 0 56  Area debrided (cm^2): 0 56  Volume (cm^3): 0 11  Tissue and other material debrided: subcutaneous tissue  Devitalized tissue debrided: biofilm, fibrin, necrotic debris, slough and eschar  Instrument(s) utilized: blade  Bleeding: small  Hemostasis obtained with: pressure  Procedural pain (0-10): insensate  Post-procedural pain: Provider E-Visit time total (minutes): 5   insensate   Response to treatment: procedure was tolerated well          Lab Results   Component Value Date    HGBA1C 8 1 (H) 05/25/2022       Subjective:      Patient ID: Donald Randhawa is a 57 y o  male  57year old male presents for evaluation of left hallux wound complicated by diabetes and PAD  Patient is accompanied by nephew who has been looking after patient  They started noticing redness over the weekend  Went to ER for evaluation  Patient denies pain to the foot  No other complaints  The following portions of the patient's history were reviewed and updated as appropriate:   He  has a past medical history of Arthritis, Diabetes mellitus ([place] [address] ), GERD (gastroesophageal reflux disease), Hypercholesteremia, Hypertension, Ischemia of right lower extremity with suspected rhabdomyolysis (2/6/2021), Left Hydropneumothorax (2/10/2021), Methadone use, Pneumonia due to COVID-19 virus (1/11/2021), and Subacute osteomyelitis of right femur ([place] [address] ) (9/13/2021)    He   Patient Active Problem List    Diagnosis Date Noted   • PAD (peripheral artery disease) ([place] [address] ) 07/27/2022   • Foot drop, left 12/22/2021   • History of COVID-19 11/04/2021   • Pulmonary fibrosis ([place] [address] ) 11/04/2021   • Depression 09/22/2021   • Chronic hyponatremia 09/14/2021   • Chronic pain syndrome 07/23/2021   • Above-knee amputation of right lower extremity ([place] [address] ) 05/11/2021   • Cervical radiculopathy 04/26/2021   • Chronic bilateral low back pain 04/26/2021   • Moderate protein-calorie malnutrition ([place] [address] ) 03/12/2021   • S/P AKA (above knee amputation), right ([place] [address] ) 03/11/2021   • Drug-induced constipation 03/08/2021   • Empyema lung ([place] [address] ) 03/01/2021   • Prolonged Q-T interval on ECG 02/17/2021   • Aortic thrombus ([place] [address] ) 01/13/2021   • Type 2 diabetes mellitus, with long-term current use of insulin ([place] [address] ) 01/11/2021   • Acute respiratory failure with hypoxia ([facility] ) 01/11/2021   • Hematuria 01/11/2021   • Vitamin D deficiency 12/05/2019   • Mononeuropathy, unspecified 11/07/2019   • Mixed hyperlipidemia 11/07/2019   • Proteinuria 11/07/2019   • Positive depression screening 08/08/2019   • Bilateral lower extremity edema 11/27/2018   • Diabetic ulcer of left heel associated with type 2 diabetes mellitus, limited to breakdown of skin ([place] [address] ) 10/30/2018   • Essential hypertension 04/13/2017   • Hyperlipidemia associated with type 2 diabetes mellitus ([place] [address] ) 04/13/2017   • Methadone maintenance therapy patient ([place] [address] ) 04/13/2017   • Type 2 diabetes mellitus with diabetic polyneuropathy ([place] [address] ) 04/13/2017   • Chronic hepatitis C without hepatic coma ([place] [address] ) 02/27/2017     He  has a past surgical history that includes Thrombectomy w/ embolectomy (Right, 1/13/2021); ARTERIOGRAM (Right, 1/13/2021); AMPUTATION ABOVE KNEE (AKA) (Right, 1/14/2021); AMPUTATION ABOVE KNEE (AKA) (Right, 1/18/2021); IR lower extremity angiogram (1/14/2021); and IR chest tube placement (2/10/2021)       Review of Systems   Constitutional: Negative for chills  Respiratory: Negative for shortness of breath  Gastrointestinal: Negative for vomiting  Musculoskeletal: Negative for arthralgias  Skin: Positive for color change and wound  Neurological: Negative for dizziness  Psychiatric/Behavioral: Negative for agitation  Objective:      /80 (BP Location: Right arm, Patient Position: Sitting, Cuff Size: Standard)   Pulse 96   Ht 5' 8" (1 727 m)   Wt 81 6 kg (180 lb) Comment: verbal, wheelchair  BMI 27 37 kg/m²          Physical Exam  Vitals reviewed  Feet:      Left foot:      Skin integrity: Ulcer present  Comments: Left dorsal hallux PIPJ level ulcer with necrotic wound base and some fibrotic tissue  Post debridement the wound 80% granular and 20% fibrotic  Erythema present to the toe extending at the level of MTPJ  Mild edema  No malodor present

## 2022-10-19 DIAGNOSIS — Z72.0 TOBACCO ABUSE: ICD-10-CM

## 2022-10-19 NOTE — TELEPHONE ENCOUNTER
Pt would like to up his dose on the patch as he feels like the 14mg isn't working too well  Please advise

## 2022-10-20 ENCOUNTER — NURSE TRIAGE (OUTPATIENT)
Dept: OTHER | Facility: OTHER | Age: 62
End: 2022-10-20

## 2022-10-20 DIAGNOSIS — Z72.0 TOBACCO ABUSE: Primary | ICD-10-CM

## 2022-10-20 RX ORDER — NICOTINE 21 MG/24HR
1 PATCH, TRANSDERMAL 24 HOURS TRANSDERMAL EVERY 24 HOURS
Qty: 28 PATCH | Refills: 0 | Status: CANCELLED | OUTPATIENT
Start: 2022-10-20

## 2022-10-20 RX ORDER — NICOTINE 21 MG/24HR
1 PATCH, TRANSDERMAL 24 HOURS TRANSDERMAL EVERY 24 HOURS
Qty: 28 PATCH | Refills: 0 | Status: SHIPPED | OUTPATIENT
Start: 2022-10-20 | End: 2022-12-06

## 2022-10-20 NOTE — TELEPHONE ENCOUNTER
Regarding: Colonoscopy and EGD Question  ----- Message from [facility] sent at 10/20/2022  4:38 PM EDT -----  "My uncle Ashley Baires has a wound on his right and left feet and has a wound that needs to be addressed  He is on antibiotics  Should his Colonoscopy and EGD for 10/25 be cancelled and rescheduled   Please advise "

## 2022-10-20 NOTE — TELEPHONE ENCOUNTER
Reason for Disposition  • [1] Caller requesting NON-URGENT health information AND [2] PCP's office is the best resource    Answer Assessment - Initial Assessment Questions  1   REASON FOR CALL or QUESTION: "What is your reason for calling today?" or "How can I best help you?" or "What question do you have that I can help answer?"      "My uncle has a wound on his left foot and he is on antibiotics, just wondering if he should still have the EGD/colonoscopy?"    Protocols used: INFORMATION ONLY CALL - NO TRIAGE[facility]

## 2022-10-21 NOTE — TELEPHONE ENCOUNTER
Please let the patient nephew know there is no need to reschedule his uncles procedures as long as he is being treated for his wounds

## 2022-10-24 DIAGNOSIS — Z79.4 TYPE 2 DIABETES MELLITUS WITH DIABETIC POLYNEUROPATHY, WITH LONG-TERM CURRENT USE OF INSULIN (HCC): ICD-10-CM

## 2022-10-24 DIAGNOSIS — I74.10 AORTIC THROMBUS (HCC): ICD-10-CM

## 2022-10-24 DIAGNOSIS — J30.2 SEASONAL ALLERGIES: ICD-10-CM

## 2022-10-24 DIAGNOSIS — E11.42 TYPE 2 DIABETES MELLITUS WITH DIABETIC POLYNEUROPATHY, WITH LONG-TERM CURRENT USE OF INSULIN (HCC): ICD-10-CM

## 2022-10-25 ENCOUNTER — OFFICE VISIT (OUTPATIENT)
Dept: WOUND CARE | Facility: CLINIC | Age: 62
End: 2022-10-25
Payer: COMMERCIAL

## 2022-10-25 VITALS
DIASTOLIC BLOOD PRESSURE: 78 MMHG | HEIGHT: 68 IN | TEMPERATURE: 97.4 F | WEIGHT: 180 LBS | HEART RATE: 108 BPM | SYSTOLIC BLOOD PRESSURE: 120 MMHG | BODY MASS INDEX: 27.28 KG/M2 | RESPIRATION RATE: 20 BRPM

## 2022-10-25 DIAGNOSIS — E11.49 OTHER DIABETIC NEUROLOGICAL COMPLICATION ASSOCIATED WITH TYPE 2 DIABETES MELLITUS (HCC): ICD-10-CM

## 2022-10-25 DIAGNOSIS — L97.526 DIABETIC ULCER OF TOE OF LEFT FOOT ASSOCIATED WITH TYPE 2 DIABETES MELLITUS, WITH BONE INVOLVEMENT WITHOUT EVIDENCE OF NECROSIS (HCC): Primary | ICD-10-CM

## 2022-10-25 DIAGNOSIS — I73.9 PAD (PERIPHERAL ARTERY DISEASE) (HCC): ICD-10-CM

## 2022-10-25 DIAGNOSIS — E11.621 DIABETIC ULCER OF TOE OF LEFT FOOT ASSOCIATED WITH TYPE 2 DIABETES MELLITUS, WITH BONE INVOLVEMENT WITHOUT EVIDENCE OF NECROSIS (HCC): Primary | ICD-10-CM

## 2022-10-25 PROCEDURE — 11042 DBRDMT SUBQ TIS 1ST 20SQCM/<: CPT | Performed by: STUDENT IN AN ORGANIZED HEALTH CARE EDUCATION/TRAINING PROGRAM

## 2022-10-25 PROCEDURE — 99213 OFFICE O/P EST LOW 20 MIN: CPT | Performed by: STUDENT IN AN ORGANIZED HEALTH CARE EDUCATION/TRAINING PROGRAM

## 2022-10-25 PROCEDURE — 99203 OFFICE O/P NEW LOW 30 MIN: CPT | Performed by: STUDENT IN AN ORGANIZED HEALTH CARE EDUCATION/TRAINING PROGRAM

## 2022-10-25 RX ORDER — SITAGLIPTIN AND METFORMIN HYDROCHLORIDE 1000; 50 MG/1; MG/1
TABLET, FILM COATED ORAL
Qty: 180 TABLET | Refills: 0 | Status: SHIPPED | OUTPATIENT
Start: 2022-10-25

## 2022-10-25 RX ORDER — LEVOCETIRIZINE DIHYDROCHLORIDE 5 MG/1
TABLET, FILM COATED ORAL
Qty: 90 TABLET | Refills: 0 | Status: SHIPPED | OUTPATIENT
Start: 2022-10-25

## 2022-10-25 RX ORDER — APIXABAN 5 MG/1
TABLET, FILM COATED ORAL
Qty: 180 TABLET | Refills: 0 | Status: SHIPPED | OUTPATIENT
Start: 2022-10-25

## 2022-10-25 RX ORDER — LISINOPRIL 10 MG/1
TABLET ORAL
Qty: 90 TABLET | Refills: 0 | Status: SHIPPED | OUTPATIENT
Start: 2022-10-25

## 2022-10-25 NOTE — PATIENT INSTRUCTIONS
Orders Placed This Encounter   Procedures    Wound cleansing and dressings     Wash your hands with soap and water  Remove old dressing, discard into plastic bag and place in trash  Cleanse the wound with normal saline prior to applying a clean dressing  Do not use tissue or cotton balls  Do not scrub the wound  Pat dry using gauze  Shower yes DO NOT GET DRESSING WET  Apply silver alginate to left great toe ulcer  Cover with gauze  Secure with kenya and tape  Change dressing three times per week and as needed for excessive drainage or leakage  Make appt  With vascular provider    Follow up one week       Standing Status:   Future     Standing Expiration Date:   10/25/2023

## 2022-10-26 ENCOUNTER — OFFICE VISIT (OUTPATIENT)
Dept: PAIN MEDICINE | Facility: CLINIC | Age: 62
End: 2022-10-26
Payer: COMMERCIAL

## 2022-10-26 VITALS
HEIGHT: 68 IN | DIASTOLIC BLOOD PRESSURE: 81 MMHG | BODY MASS INDEX: 27.37 KG/M2 | HEART RATE: 90 BPM | SYSTOLIC BLOOD PRESSURE: 124 MMHG

## 2022-10-26 DIAGNOSIS — S78.111A ABOVE-KNEE AMPUTATION OF RIGHT LOWER EXTREMITY (HCC): ICD-10-CM

## 2022-10-26 DIAGNOSIS — G89.4 CHRONIC PAIN SYNDROME: Primary | ICD-10-CM

## 2022-10-26 DIAGNOSIS — E11.42 TYPE 2 DIABETES MELLITUS WITH DIABETIC POLYNEUROPATHY, UNSPECIFIED WHETHER LONG TERM INSULIN USE (HCC): ICD-10-CM

## 2022-10-26 DIAGNOSIS — E11.42 TYPE 2 DIABETES MELLITUS WITH DIABETIC POLYNEUROPATHY, WITH LONG-TERM CURRENT USE OF INSULIN (HCC): ICD-10-CM

## 2022-10-26 DIAGNOSIS — Z89.611 S/P AKA (ABOVE KNEE AMPUTATION), RIGHT (HCC): ICD-10-CM

## 2022-10-26 DIAGNOSIS — Z79.4 TYPE 2 DIABETES MELLITUS WITH DIABETIC POLYNEUROPATHY, WITH LONG-TERM CURRENT USE OF INSULIN (HCC): ICD-10-CM

## 2022-10-26 PROCEDURE — 99214 OFFICE O/P EST MOD 30 MIN: CPT | Performed by: NURSE PRACTITIONER

## 2022-10-26 RX ORDER — DULOXETIN HYDROCHLORIDE 60 MG/1
60 CAPSULE, DELAYED RELEASE ORAL
Qty: 30 CAPSULE | Refills: 2 | Status: SHIPPED | OUTPATIENT
Start: 2022-10-26

## 2022-10-26 NOTE — PROGRESS NOTES
Assessment:  1  Chronic pain syndrome    2  S/P AKA (above knee amputation), right ([facility] )    3  Type 2 diabetes mellitus with diabetic polyneuropathy, unspecified whether long term insulin use ([facility] )    4  Above-knee amputation of right lower extremity ([facility] )    5  Type 2 diabetes mellitus with diabetic polyneuropathy, with long-term current use of insulin ([facility] )        Plan:  While the patient was in the office today, I did have a thorough conversation regarding their chronic pain syndrome, medication management, and treatment plan options  Patient is being seen for follow-up visit  He was last seen here on 08/26/2022 at which time he was with a weaning schedule for Lyrica  Cymbalta was increased to 60 mg at bedtime  Overall, he reports that medication reduces his pain by up to 50%  Pain is pretty tolerable at this time  Continue Cymbalta 60 mg at bedtime  Prescription was sent to his pharmacy with refills  The patient will follow-up in 3 months for medication prescription refill and reevaluation  The patient was advised to contact the office should their symptoms worsen in the interim  The patient was agreeable and verbalized an understanding  History of Present Illness: The patient is a 57 y o  male who presents for a follow up office visit in regards to Arm Pain, Leg Pain, and Foot Pain  The patient’s current symptoms include complaints of right stump pain  Current pain level is a 2/10  Quality pain is described as dull, aching, cramping, shooting, numb  Current pain medications includes:  Cymbalta 60 mg daily at bedtime   The patient reports that this regimen is providing up to 50 % pain relief  The patient is reporting no side effects from this pain medication regimen  I have personally reviewed and/or updated the patient's past medical history, past surgical history, family history, social history, current medications, allergies, and vital signs today           Review of Systems  Review of Systems   Constitutional: Negative for unexpected weight change  HENT: Negative for hearing loss  Eyes: Negative for visual disturbance  Respiratory: Negative for shortness of breath  Cardiovascular: Negative for leg swelling  Gastrointestinal: Positive for constipation, nausea and vomiting  Endocrine: Negative for polyuria  Genitourinary: Negative for difficulty urinating  Musculoskeletal: Positive for arthralgias, gait problem and myalgias  Negative for joint swelling  Pain in extremity- amputee   Skin: Negative for rash  Neurological: Negative for weakness and headaches  Psychiatric/Behavioral: Negative for decreased concentration  All other systems reviewed and are negative  Past Medical History:   Diagnosis Date   • Arthritis    • [facility] )    • GERD (gastroesophageal reflux disease)    • Hypercholesteremia    • Hypertension    • Ischemia of right lower extremity with suspected rhabdomyolysis 2/6/2021   • Left Hydropneumothorax 2/10/2021   • Methadone use    • Pneumonia due to COVID-19 virus 1/11/2021   • Subacute osteomyelitis of right femur ([facility] ) 9/13/2021       Past Surgical History:   Procedure Laterality Date   • 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    Procedure: AMPUTATION ABOVE KNEE (AKA);   Surgeon: Julio Cesar Coleman MD;  Location: [facility];  Service: Vascular   • AMPUTATION ABOVE KNEE (AKA) Right 1/18/2021    Procedure: AMPUTATION ABOVE KNEE (AKA) FORMALIZATION,  R AKA wound washout, wound closure;  Surgeon: Julio Cesar Coleman MD;  Location: [facility];  Service: Vascular   • ARTERIOGRAM Right 1/13/2021    Procedure: ARTERIOGRAM;  Surgeon: Tiana Powers DO;  Location: [facility];  Service: Vascular   • IR CHEST TUBE PLACEMENT  2/10/2021   • IR LOWER EXTREMITY ANGIOGRAM  1/14/2021   • THROMBECTOMY W/ EMBOLECTOMY Right 1/13/2021    Procedure: EMBOLECTOMY/THROMBECTOMY LOWER EXTREMITY;  Surgeon: Tiana Powers DO;  Location: [facility];  Service: Vascular       Family History   Problem Relation Age of Onset   • Diabetes Mother    • Hypertension Father    • Leukemia Father    • Acute lymphoblastic leukemia Father    • Cancer Sister        Social History     Occupational History   • Not on file   Tobacco Use   • Smoking status: Current Every Day Smoker     Packs/day: 0 50     Types: Cigarettes   • Smokeless tobacco: Never Used   Vaping Use   • Vaping Use: Never used   Substance and Sexual Activity   • Alcohol use: Not Currently   • Drug use: No     Comment: methadone   • Sexual activity: Not on file         Current Outpatient Medications:   •  acetaminophen (TYLENOL) 325 mg tablet, Take 3 tablets (975 mg total) by mouth every 8 (eight) hours (Patient taking differently: Take 650 mg by mouth 3 (three) times a day as needed for moderate pain), Disp: , Rfl: 0  •  albuterol (PROVENTIL HFA,VENTOLIN HFA) 90 mcg/act inhaler, Inhale 2 puffs every 4 (four) hours as needed for wheezing, Disp: , Rfl: 0  •  aspirin 81 mg chewable tablet, Chew 81 mg daily, Disp: , Rfl:   •  atorvastatin (LIPITOR) 20 mg tablet, Take 1 tablet (20 mg total) by mouth daily, Disp: 90 tablet, Rfl: 3  •  bisacodyl (DULCOLAX) 5 mg EC tablet, Take as instructed by GI office for bowel prep for colonoscopy, Disp: 2 tablet, Rfl: 0  •  Blood Glucose Monitoring Suppl (OneTouch Verio) w/Device KIT, Use to test blood sugars 3 times daily, Disp: 1 kit, Rfl: 0  •  cholecalciferol (VITAMIN D3) 1,000 units tablet, Take 2 tablets (2,000 Units total) by mouth daily, Disp: 12 tablet, Rfl: 0  •  Continuous Blood Gluc  (FreeStyle Ke 2 [place]) JOSEPH, Use as directed, Disp: 1 each, Rfl: 0  •  Continuous Blood Gluc Sensor (FreeStyle Ke 2 Sensor) MISC, Change every 14 days, Disp: 6 each, Rfl: 2  •  Diclofenac Sodium (VOLTAREN) 1 %, Apply 4 g topically 4 (four) times a day (Patient taking differently: Apply 4 g topically 4 (four) times a day as needed), Disp: , Rfl: 0  •  DULoxetine (CYMBALTA) 60 mg delayed release capsule, Take 1 capsule (60 mg total) by mouth daily at bedtime, Disp: 30 capsule, Rfl: 2  •  Eliquis 5 MG, Take 1 tablet by mouth twice daily, Disp: 180 tablet, Rfl: 0  •  famotidine (PEPCID) 20 mg tablet, Take 1 tablet (20 mg total) by mouth 2 (two) times a day, Disp: 30 tablet, Rfl: 8  •  fluticasone (FLONASE) 50 mcg/act nasal spray, 1 spray into each nostril daily (Patient taking differently: 1 spray into each nostril if needed), Disp: 16 g, Rfl: 2  •  glucose blood test strip, Use 1 each 3 (three) times daily after meals Use as instructed, Disp: 270 each, Rfl: 3  •  insulin glargine (LANTUS) 100 units/mL subcutaneous injection, Inject 27 Units under the skin daily at bedtime, Disp: 10 mL, Rfl: 3  •  insulin lispro (HumaLOG KwikPen) 100 units/mL injection pen, Blood Glucose 150 - 224: 4 Unit of Insulin Blood Glucose 225 - 299: 5 Units of Insulin Blood Glucose 300 - 374: 6 Units of Insulin Blood Glucose 375 - 449: 7 Units of Insulin Blood Glucose greater than or equal to 450: 8 Units of Insulin Max of 24 units per day, Disp: 15 mL, Rfl: 3  •  Insulin Pen Needle 31G X 5 MM MISC, Use daily Pt to inject 4 X daily, Disp: 100 each, Rfl: 5  •  Insulin Pen Needle 31G X 5 MM MISC, 30 units sq 2X daily, Disp: 100 each, Rfl: 3  •  Insulin Syringe-Needle U-100 (BD Veo Insulin Syringe U/F) 31G X 15/64" 0 5 ML MISC, Inject under the skin 3 (three) times a day Use as directed, Disp: 300 each, Rfl: 5  •  Janumet  MG per tablet, TAKE 1 TABLET BY MOUTH TWICE DAILY WITH MEALS, Disp: 180 tablet, Rfl: 0  •  Jardiance 25 MG TABS, TAKE 1 TABLET BY MOUTH ONCE DAILY IN THE MORNING, Disp: 30 tablet, Rfl: 0  •  levocetirizine (XYZAL) 5 MG tablet, TAKE 1 TABLET BY MOUTH ONCE DAILY IN THE EVENING, Disp: 90 tablet, Rfl: 0  •  lidocaine (LMX) 4 % cream, Apply topically 4 (four) times a day as needed for mild pain, Disp: 30 g, Rfl: 0  •  lisinopril (ZESTRIL) 10 mg tablet, Take 1 tablet by mouth once daily, Disp: 90 tablet, Rfl: 0  •  magnesium oxide (MAG-OX) 400 mg, Take 1 tablet (400 mg total) by mouth 2 (two) times a day, Disp: 60 tablet, Rfl: 0  •  menthol-methyl salicylate (BENGAY) 27-12 % cream, Apply topically 4 (four) times a day as needed (torticollis), Disp: , Rfl: 0  •  methadone (DOLOPHINE) 10 mg tablet, Take 70 mg by mouth daily,, Disp: , Rfl:   •  multivitamin-minerals (CENTRUM ADULTS) tablet, Take 1 tablet by mouth daily, Disp: , Rfl: 0  •  naloxone (NARCAN) 4 mg/0 1 mL nasal spray, Administer 1 spray into a nostril  If no response after 2-3 minutes, give another dose in the other nostril using a new spray , Disp: 1 each, Rfl: 1  •  nicotine (NICODERM CQ) 21 mg/24 hr TD 24 hr patch, Place 1 patch on the skin every 24 hours, Disp: 28 patch, Rfl: 0  •  ondansetron (ZOFRAN-ODT) 4 mg disintegrating tablet, DISSOLVE 1 TABLET IN MOUTH EVERY 6 HOURS AS NEEDED FOR NAUSEA FOR VOMITING, Disp: 40 tablet, Rfl: 0  •  pantoprazole (PROTONIX) 40 mg tablet, Take 1 tablet (40 mg total) by mouth daily, Disp: 30 tablet, Rfl: 3  •  polyethylene glycol (GOLYTELY) 4000 mL solution, Take as instructed by GI office for bowel prep, Disp: 4000 mL, Rfl: 0  •  silver sulfadiazine (SILVADENE,SSD) 1 % cream, Apply topically daily Apply as directed to left heel ulcer daily with each dressing change , Disp: 25 g, Rfl: 2  •  BinaxNOW COVID-19 Ag Home Test KIT, , Disp: , Rfl:     Allergies   Allergen Reactions   • Varenicline        Physical Exam:    /81   Pulse 90   Ht 5' 8" (1 727 m)   BMI 27 37 kg/m²     Constitutional:normal, well developed, well nourished, alert, in no distress and non-toxic and no overt pain behavior    Eyes:anicteric  HEENT:grossly intact  Neck:supple, symmetric, trachea midline and no masses   Pulmonary:even and unlabored  Cardiovascular:No edema or pitting edema present  Skin:Normal without rashes or lesions and well hydrated  Psychiatric:Mood and affect appropriate  Neurologic:Cranial Nerves II-XII grossly intact  Musculoskeletal:in wheelchair  +right AKA  Imaging  No orders to display       No orders of the defined types were placed in this encounter

## 2022-10-26 NOTE — PROGRESS NOTES
Patient ID: Mirza Syed is a 57 y o  male Date of Birth 1960     Diagnosis:  1  Diabetic ulcer of toe of left foot associated with type 2 diabetes mellitus, with bone involvement without evidence of necrosis ([facility])  -     Wound cleansing and dressings; Future  -     Ambulatory Referral to Vascular Surgery; Future  -     MRI foot/forefoot toes left wo contrast; Future; Expected date: 10/25/2022  -     Wound home care; Future    2  PAD (peripheral artery disease) ([facility] )  -     Ambulatory Referral to Vascular Surgery; Future  -     Wound home care; Future    3  Other diabetic neurological complication associated with type 2 diabetes mellitus ([facility] )  -     Ambulatory Referral to Vascular Surgery; Future  -     MRI foot/forefoot toes left wo contrast; Future; Expected date: 10/25/2022  -     Wound home care; Future        Diagnosis ICD-10-CM Associated Orders   1  Diabetic ulcer of toe of left foot associated with type 2 diabetes mellitus, with bone involvement without evidence of necrosis ([facility])  E11 621 Wound cleansing and dressings    [id number] [id number] Ambulatory Referral to Vascular Surgery     MRI foot/forefoot toes left wo contrast     Wound home care   2  PAD (peripheral artery disease) ([facility])  I73 9 Ambulatory Referral to Vascular Surgery     Wound home care   3  Other diabetic neurological complication associated with type 2 diabetes mellitus ([facility] )  E11 49 Ambulatory Referral to Vascular Surgery     MRI foot/forefoot toes left wo contrast     Wound home care          Assessment & Plan:  1  Diabetic ulcer left hallux - haley 3   2  Diabetic neuropathy   3  PAD  4   Hx of Left AKA     Plan:    - MRI ordered for further evaluation of osteomyelitis of the left hallux  - LEADs reviewed  - Vascular surgery referral placed  - Wound debrided as below, Continue LWC for now with maxorb Ag DSD    - Discussed tight glycemic control and proper protein intake for wound healing    - Continue to monitor for clinical signs of infection, if present please call office immediately or visit near by ER      Chief Complaint   Patient presents with   • New Patient Visit     Patient here today for wound on left foot  Current wound treatment is calcium alginate  Subjective:   Patient presents for continued care management left hallux  Was unable to follow up at office  He has been taking the antibiotics Doxy as Rx by ER  Nephew is doing dressing changes daily  No other complaints          The following portions of the patient's history were reviewed and updated as appropriate:   Patient Active Problem List   Diagnosis   • Chronic hepatitis C without hepatic coma ([place] [address] )   • Essential hypertension   • Hyperlipidemia associated with type 2 diabetes mellitus ([place] [address] )   • Methadone maintenance therapy patient ([place] [address] )   • Type 2 diabetes mellitus with diabetic polyneuropathy ([place] [address] )   • Diabetic ulcer of left heel associated with type 2 diabetes mellitus, limited to breakdown of skin ([facility])   • Bilateral lower extremity edema   • Positive depression screening   • Mononeuropathy, unspecified   • Mixed hyperlipidemia   • Proteinuria   • Vitamin D deficiency   • Type 2 diabetes mellitus, with long-term current use of insulin ([facility])   • Acute respiratory failure with hypoxia ([facility])   • Hematuria   • Aortic thrombus ([facility])   • Prolonged Q-T interval on ECG   • Empyema lung ([facility])   • S/P AKA (above knee amputation), right (HCC)   • Cervical radiculopathy   • Chronic bilateral low back pain   • Above-knee amputation of right lower extremity ([facility])   • Drug-induced constipation   • Moderate protein-calorie malnutrition (HCC)   • Chronic pain syndrome   • Chronic hyponatremia   • Depression   • History of COVID-19   • Pulmonary fibrosis (HCC)   • Foot drop, left   • PAD (peripheral artery disease) ([facility])     Past Medical History:   Diagnosis Date   • Arthritis    • Diabetes mellitus ([place] [address] )    • GERD (gastroesophageal reflux disease)    • Hypercholesteremia    • Hypertension    • Ischemia of right lower extremity with suspected rhabdomyolysis 2/6/2021   • Left Hydropneumothorax 2/10/2021   • Methadone use    • Pneumonia due to COVID-19 virus 1/11/2021   • Subacute osteomyelitis of right femur ([address] ) 9/13/2021     Past Surgical History:   Procedure Laterality Date   • 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    Procedure: AMPUTATION ABOVE KNEE (AKA);   Surgeon: Mary Ann Escobar MD;  Location: [facility];  Service: Vascular   • AMPUTATION ABOVE KNEE (AKA) Right 1/18/2021    Procedure: AMPUTATION ABOVE KNEE (AKA) FORMALIZATION,  R AKA wound washout, wound closure;  Surgeon: Mary Ann Escobar MD;  [facility];  Service: Vascular   • ARTERIOGRAM Right 1/13/2021    Procedure: ARTERIOGRAM;  Surgeon: Garfield Syed DO;  [facility]: [facility];  Service: Vascular   • IR CHEST TUBE PLACEMENT  2/10/2021   • IR LOWER EXTREMITY ANGIOGRAM  1/14/2021   • THROMBECTOMY W/ EMBOLECTOMY Right 1/13/2021    Procedure: EMBOLECTOMY/THROMBECTOMY LOWER EXTREMITY;  Surgeon: Garfield Syed DO;  [facility];  Service: Vascular     Social History     Socioeconomic History   • Marital status:      Spouse name: None   • Number of children: None   • Years of education: None   • Highest education level: None   Occupational History   • None   Tobacco Use   • Smoking status: Current Every Day Smoker     Packs/day: 0 50     Types: Cigarettes   • Smokeless tobacco: Never Used   Vaping Use   • Vaping Use: Never used   Substance and Sexual Activity   • Alcohol use: Not Currently   • Drug use: No     Comment: methadone   • Sexual activity: None   Other Topics Concern   • None   Social History Narrative   • None     Social Determinants of Health     Financial Resource Strain: Not on file   Food Insecurity: Not on file   Transportation Needs: Not on file   Physical Activity: Not on file   Stress: Not on file   Social Connections: Not on file   Intimate Partner Violence: Not on file Housing Stability: Not on file        Current Outpatient Medications:   •  acetaminophen (TYLENOL) 325 mg tablet, Take 3 tablets (975 mg total) by mouth every 8 (eight) hours (Patient taking differently: Take 650 mg by mouth 3 (three) times a day as needed for moderate pain), Disp: , Rfl: 0  •  albuterol (PROVENTIL HFA,VENTOLIN HFA) 90 mcg/act inhaler, Inhale 2 puffs every 4 (four) hours as needed for wheezing, Disp: , Rfl: 0  •  aspirin 81 mg chewable tablet, Chew 81 mg daily, Disp: , Rfl:   •  atorvastatin (LIPITOR) 20 mg tablet, Take 1 tablet (20 mg total) by mouth daily, Disp: 90 tablet, Rfl: 3  •  BinaxNOW COVID-19 Ag Home Test KIT, , Disp: , Rfl:   •  bisacodyl (DULCOLAX) 5 mg EC tablet, Take as instructed by GI office for bowel prep for colonoscopy, Disp: 2 tablet, Rfl: 0  •  Blood Glucose Monitoring Suppl (OneTouch Verio) w/Device KIT, Use to test blood sugars 3 times daily, Disp: 1 kit, Rfl: 0  •  cholecalciferol (VITAMIN D3) 1,000 units tablet, Take 2 tablets (2,000 Units total) by mouth daily, Disp: 12 tablet, Rfl: 0  •  Continuous Blood Gluc  (FreeStyle Ke 2 [place]) JOSEPH, Use as directed, Disp: 1 each, Rfl: 0  •  Continuous Blood Gluc Sensor (FreeStyle Ke 2 Sensor) MISC, Change every 14 days, Disp: 6 each, Rfl: 2  •  Diclofenac Sodium (VOLTAREN) 1 %, Apply 4 g topically 4 (four) times a day (Patient taking differently: Apply 4 g topically 4 (four) times a day as needed), Disp: , Rfl: 0  •  DULoxetine (CYMBALTA) 60 mg delayed release capsule, Take 1 capsule (60 mg total) by mouth in the morning, Disp: 30 capsule, Rfl: 1  •  Eliquis 5 MG, Take 1 tablet by mouth twice daily, Disp: 180 tablet, Rfl: 0  •  famotidine (PEPCID) 20 mg tablet, Take 1 tablet (20 mg total) by mouth 2 (two) times a day, Disp: 30 tablet, Rfl: 8  •  fluticasone (FLONASE) 50 mcg/act nasal spray, 1 spray into each nostril daily (Patient taking differently: 1 spray into each nostril if needed), Disp: 16 g, Rfl: 2  • glucose blood test strip, Use 1 each 3 (three) times daily after meals Use as instructed, Disp: 270 each, Rfl: 3  •  insulin glargine (LANTUS) 100 units/mL subcutaneous injection, Inject 27 Units under the skin daily at bedtime, Disp: 10 mL, Rfl: 3  •  insulin lispro (HumaLOG KwikPen) 100 units/mL injection pen, Blood Glucose 150 - 224: 4 Unit of Insulin Blood Glucose 225 - 299: 5 Units of Insulin Blood Glucose 300 - 374: 6 Units of Insulin Blood Glucose 375 - 449: 7 Units of Insulin Blood Glucose greater than or equal to 450: 8 Units of Insulin Max of 24 units per day, Disp: 15 mL, Rfl: 3  •  Insulin Pen Needle 31G X 5 MM MISC, Use daily Pt to inject 4 X daily, Disp: 100 each, Rfl: 5  •  Insulin Pen Needle 31G X 5 MM MISC, 30 units sq 2X daily, Disp: 100 each, Rfl: 3  •  Insulin Syringe-Needle U-100 (BD Veo Insulin Syringe U/F) 31G X 15/64" 0 5 ML MISC, Inject under the skin 3 (three) times a day Use as directed, Disp: 300 each, Rfl: 5  •  Janumet  MG per tablet, TAKE 1 TABLET BY MOUTH TWICE DAILY WITH MEALS, Disp: 180 tablet, Rfl: 0  •  Jardiance 25 MG TABS, TAKE 1 TABLET BY MOUTH ONCE DAILY IN THE MORNING, Disp: 30 tablet, Rfl: 0  •  levocetirizine (XYZAL) 5 MG tablet, TAKE 1 TABLET BY MOUTH ONCE DAILY IN THE EVENING, Disp: 90 tablet, Rfl: 0  •  lidocaine (LMX) 4 % cream, Apply topically 4 (four) times a day as needed for mild pain, Disp: 30 g, Rfl: 0  •  lisinopril (ZESTRIL) 10 mg tablet, Take 1 tablet by mouth once daily, Disp: 90 tablet, Rfl: 0  •  magnesium oxide (MAG-OX) 400 mg, Take 1 tablet (400 mg total) by mouth 2 (two) times a day, Disp: 60 tablet, Rfl: 0  •  menthol-methyl salicylate (BENGAY) 07-04 % cream, Apply topically 4 (four) times a day as needed (torticollis), Disp: , Rfl: 0  •  methadone (DOLOPHINE) 10 mg tablet, Take 70 mg by mouth daily,, Disp: , Rfl:   •  multivitamin-minerals (CENTRUM ADULTS) tablet, Take 1 tablet by mouth daily, Disp: , Rfl: 0  •  naloxone (NARCAN) 4 mg/0 1 mL nasal spray, Administer 1 spray into a nostril  If no response after 2-3 minutes, give another dose in the other nostril using a new spray , Disp: 1 each, Rfl: 1  •  nicotine (NICODERM CQ) 21 mg/24 hr TD 24 hr patch, Place 1 patch on the skin every 24 hours, Disp: 28 patch, Rfl: 0  •  ondansetron (ZOFRAN-ODT) 4 mg disintegrating tablet, DISSOLVE 1 TABLET IN MOUTH EVERY 6 HOURS AS NEEDED FOR NAUSEA FOR VOMITING, Disp: 40 tablet, Rfl: 0  •  pantoprazole (PROTONIX) 40 mg tablet, Take 1 tablet (40 mg total) by mouth daily, Disp: 30 tablet, Rfl: 3  •  polyethylene glycol (GOLYTELY) 4000 mL solution, Take as instructed by GI office for bowel prep, Disp: 4000 mL, Rfl: 0  •  silver sulfadiazine (SILVADENE,SSD) 1 % cream, Apply topically daily Apply as directed to left heel ulcer daily with each dressing change , Disp: 25 g, Rfl: 2  Family History   Problem Relation Age of Onset   • Diabetes Mother    • Hypertension Father    • Leukemia Father    • Acute lymphoblastic leukemia Father    • Cancer Sister       Review of Systems   Constitutional: Negative for chills  Respiratory: Negative for shortness of breath  Cardiovascular: Negative for leg swelling  Gastrointestinal: Negative for blood in stool and diarrhea  Musculoskeletal: Negative for arthralgias  Skin: Positive for color change and wound  Neurological: Negative for dizziness  Psychiatric/Behavioral: Negative for agitation  Allergies  Varenicline    Objective:  /78   Pulse (!) 108   Temp (!) 97 4 °F (36 3 °C)   Resp 20   Ht 5' 8" (1 727 m)   Wt 81 6 kg (180 lb)   BMI 27 37 kg/m²     Physical Exam  Vitals reviewed  Cardiovascular:      Pulses:           Dorsalis pedis pulses are 0 on the left side  Posterior tibial pulses are 0 on the left side  Musculoskeletal:         General: Swelling and tenderness present  Left foot: Deformity present  Feet:      Left foot:      Skin integrity: Ulcer present        Comments: Left hallux hammertoe deformity  Left dorsal PIPJ ulcer probes to bone with fibrotic and necrotic wound base  Mild periwound erythema noted that is dependent rubor  No pain with palpation  NO malodor noted  Skin:     General: Skin is warm and dry  Findings: Erythema present  Neurological:      General: No focal deficit present  Mental Status: He is alert  Psychiatric:         Mood and Affect: Mood normal              Wound 10/25/22 Diabetic Ulcer Toe (Comment  which one) Anterior; Left (Active)   Wound Image   10/25/22 1449   Wound Description Black; Brown;Yellow; Exposed bone 10/25/22 1449   Renuka-wound Assessment Erythema 10/25/22 1449   Wound Length (cm) 0 7 cm 10/25/22 1449   Wound Width (cm) 1 cm 10/25/22 1449   Wound Depth (cm) 0 1 cm 10/25/22 1449   Wound Surface Area (cm^2) 0 7 cm^2 10/25/22 1449   Wound Volume (cm^3) 0 07 cm^3 10/25/22 1449   Calculated Wound Volume (cm^3) 0 07 cm^3 10/25/22 1449   Drainage Amount Moderate 10/25/22 1449   Drainage Description Serous 10/25/22 1449   Non-staged Wound Description Full thickness 10/25/22 1449   Treatments Cleansed 10/25/22 1449   Patient Tolerance Tolerated well 10/25/22 1449                             Debridement   Wound 10/25/22 Diabetic Ulcer Toe (Comment  which one) Anterior; Left    Universal Protocol:  Consent: Verbal consent obtained    Risks and benefits: risks, benefits and alternatives were discussed  Consent given by: patient  Patient understanding: patient states understanding of the procedure being performed  Patient identity confirmed: verbally with patient      Performed by: physician  Debridement type: surgical  Level of debridement: subcutaneous tissue  Pain control: lidocaine 4%  Post-debridement measurements  Length (cm): 0 8  Width (cm): 1  Depth (cm): 0 3  Percent debrided: 100%  Surface Area (cm^2): 0 8  Area debrided (cm^2): 0 8  Volume (cm^3): 0 24  Tissue and other material debrided: subcutaneous tissue  Devitalized tissue debrided: biofilm, exudate, necrotic debris and slough  Instrument(s) utilized: nippers  Bleeding: small  Hemostasis obtained with: pressure  Procedural pain (0-10): insensate  Post-procedural pain: insensate   Response to treatment: procedure was tolerated well                 Wound Instructions:  Orders Placed This Encounter   Procedures   • Wound cleansing and dressings     Wash your hands with soap and water  Remove old dressing, discard into plastic bag and place in trash  Cleanse the wound with normal saline prior to applying a clean dressing  Do not use tissue or cotton balls  Do not scrub the wound  Pat dry using gauze  Shower yes DO NOT GET DRESSING WET  Apply silver alginate to left great toe ulcer  Cover with gauze  Secure with kenya and tape  Change dressing three times per week and as needed for excessive drainage or leakage  Make appt  With vascular provider    Follow up one week  Standing Status:   Future     Standing Expiration Date:   10/25/2023   • Wound home care     Referral to [facility] care for wound care every other day     Standing Status:   Future     Standing Expiration Date:   10/25/2023   • MRI foot/forefoot toes left wo contrast     Standing Status:   Future     Standing Expiration Date:   10/25/2026     Scheduling Instructions: There is no preparation for this test  Please leave your jewelry and valuables at home, wedding rings are the exception  All patients will be required to change into a hospital gown and pants  Street clothes are not permitted in the MRI  Magnetic nail polish must be removed prior to arrival for your test  Please bring your insurance cards, a form of photo ID and a list of your medications with you  Arrive 15 minutes prior to your appointment time in order to register  Please bring any prior CT or MRI studies of this area that were not performed at a [facility]              To schedule this appointment, please contact [facility] at [phone]  Prior to your appointment, please make sure you complete the MRI Screening Form when you e-Check in for your appointment  This will be available starting 7 days before your appointment in [address]  You may receive an e-mail with an activation code if you do not have a [facility] account  If you do not have access to a device, we will complete your screening at your appointment  Order Specific Question:   What is the patient's sedation requirement? Answer:   Unknown     Order Specific Question:   Release to patient through [facility]     Answer:   Immediate     Order Specific Question:   Is order priority selected as STAT? Answer:   No     Order Specific Question:   Reason for Exam (FREE TEXT)     Answer:   Left hallux dorsal ulcer at the PIPJ, rule out OM of the halluz  • Ambulatory Referral to Vascular Surgery     Standing Status:   Future     Standing Expiration Date:   10/25/2023     Referral Priority:   Routine     Referral Type:   Consult - AMB     Referral Reason:   Specialty Services Required     Requested Specialty:   Vascular Surgery     Number of Visits Requested:   1     Expiration Date:   10/25/2023         Reema Kapoor    Portions of the record may have been created with voice recognition software  Occasional wrong word or "sound a like" substitutions may have occurred due to the inherent limitations of voice recognition software  Read the chart carefully and recognize, using context, where substitutions have occurred

## 2022-11-01 ENCOUNTER — TELEPHONE (OUTPATIENT)
Dept: PODIATRY | Facility: CLINIC | Age: 62
End: 2022-11-01

## 2022-11-01 ENCOUNTER — OFFICE VISIT (OUTPATIENT)
Dept: WOUND CARE | Facility: CLINIC | Age: 62
End: 2022-11-01

## 2022-11-01 VITALS
RESPIRATION RATE: 18 BRPM | DIASTOLIC BLOOD PRESSURE: 87 MMHG | SYSTOLIC BLOOD PRESSURE: 156 MMHG | HEART RATE: 99 BPM | TEMPERATURE: 98.7 F

## 2022-11-01 DIAGNOSIS — I73.9 PAD (PERIPHERAL ARTERY DISEASE) (HCC): ICD-10-CM

## 2022-11-01 DIAGNOSIS — L97.526 DIABETIC ULCER OF TOE OF LEFT FOOT ASSOCIATED WITH TYPE 2 DIABETES MELLITUS, WITH BONE INVOLVEMENT WITHOUT EVIDENCE OF NECROSIS (HCC): Primary | ICD-10-CM

## 2022-11-01 DIAGNOSIS — E11.621 DIABETIC ULCER OF TOE OF LEFT FOOT ASSOCIATED WITH TYPE 2 DIABETES MELLITUS, WITH BONE INVOLVEMENT WITHOUT EVIDENCE OF NECROSIS (HCC): Primary | ICD-10-CM

## 2022-11-01 NOTE — PROGRESS NOTES
Patient ID: Levi Ferro is a 57 y o  male Date of Birth 1960     Diagnosis:  1  Diabetic ulcer of toe of left foot associated with type 2 diabetes mellitus, with bone involvement without evidence of necrosis [facility])  -     Referral to [address] [place] [address]; Future  -     Wound cleansing and dressings; Future    2  PAD (peripheral artery disease) ([facility] [address] )  -     Referral to [address] [place] [address]; Future  -     Wound cleansing and dressings; Future       Diagnosis ICD-10-CM Associated Orders   1  Diabetic ulcer of toe of left foot associated with type 2 diabetes mellitus, with bone involvement without evidence of necrosis [facility])  E11 621 Referral to [facility]    [id number] Wound cleansing and dressings   2  PAD (peripheral artery disease) ([facility])  I73 9 Referral to [facility]     Wound cleansing and dressings        Assessment & Plan:  1  Diabetic ulcer left hallux - haley 3   2  Diabetic neuropathy   3  PAD  4  Hx of Left AKA      Plan:     - MRI ordered for further evaluation of osteomyelitis of the left hallux  - Vascular surgery referral placed  - Wound debrided as below, Continue LWC for now with maxorb Ag DSD    - Discussed tight glycemic control and proper protein intake for wound healing    - Continue to monitor for clinical signs of infection, if present please call office immediately or visit near by ER, currently wound is not acutely infected for which patient does not require PO antibiotics  Will await for MRI and vascular surgery input, if MRI is positive of OM of left hallux he will require amputation  Chief Complaint   Patient presents with   • Follow Up Wound Care Visit           Subjective:   Patient presents with a nephew for continued evaluation of left hallux ulcer  Patient reports he has been smoking cigarettes since last month  The nephew has been doing dressing changes at home as recommended  No other complaints at this time        The following portions of the patient's history were reviewed and updated as appropriate:   Patient Active Problem List   Diagnosis   • Chronic hepatitis C without hepatic coma ([place] [address] )   • Essential hypertension   • Hyperlipidemia associated with type 2 diabetes mellitus ([place] [address] )   • Methadone maintenance therapy patient ([place] [address] )   • Type 2 diabetes mellitus with diabetic polyneuropathy ([place] [address] )   • Diabetic ulcer of left heel associated with type 2 diabetes mellitus, limited to breakdown of skin (HCC)   • Bilateral lower extremity edema   • Positive depression screening   • Mononeuropathy, unspecified   • Mixed hyperlipidemia   • Proteinuria   • Vitamin D deficiency   • Type 2 diabetes mellitus, with long-term current use of insulin (HCC)   • Acute respiratory failure with hypoxia (HCC)   • Hematuria   • Aortic thrombus (HCC)   • Prolonged Q-T interval on ECG   • Empyema lung (HCC)   • S/P AKA (above knee amputation), right (HCC)   • Cervical radiculopathy   • Chronic bilateral low back pain   • Above-knee amputation of right lower extremity (HCC)   • Drug-induced constipation   • Moderate protein-calorie malnutrition (HCC)   • Chronic pain syndrome   • Chronic hyponatremia   • Depression   • History of COVID-19   • Pulmonary fibrosis (HCC)   • Foot drop, left   • PAD (peripheral artery disease) (HCC)     Past Medical History:   Diagnosis Date   • Arthritis    • Diabetes mellitus ([place] [address] )    • GERD (gastroesophageal reflux disease)    • Hypercholesteremia    • Hypertension    • Ischemia of right lower extremity with suspected rhabdomyolysis 2/6/2021   • Left Hydropneumothorax 2/10/2021   • Methadone use    • Pneumonia due to COVID-19 virus 1/11/2021   • Subacute osteomyelitis of right femur ([place] [address] ) 9/13/2021     Past Surgical History:   Procedure Laterality Date   • 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    Procedure: AMPUTATION ABOVE KNEE (AKA);   Surgeon: Caesar Gusman MD;  [facility];  Service: Vascular   • AMPUTATION ABOVE KNEE (AKA) Right 1/18/2021    Procedure: AMPUTATION ABOVE KNEE (AKA) FORMALIZATION,  R AKA wound washout, wound closure;  Surgeon: Santy Morgan MD;  [facility];  Service: Vascular   • ARTERIOGRAM Right 1/13/2021    Procedure: ARTERIOGRAM;  Surgeon: Sobia Dietrich DO;  [facility];  Service: Vascular   • IR CHEST TUBE PLACEMENT  2/10/2021   • IR LOWER EXTREMITY ANGIOGRAM  1/14/2021   • THROMBECTOMY W/ EMBOLECTOMY Right 1/13/2021    Procedure: EMBOLECTOMY/THROMBECTOMY LOWER EXTREMITY;  Surgeon: Sobia Dietrich DO;  [facility];  Service: Vascular     Social History     Socioeconomic History   • Marital status:      Spouse name: None   • Number of children: None   • Years of education: None   • Highest education level: None   Occupational History   • None   Tobacco Use   • Smoking status: Current Every Day Smoker     Packs/day: 0 50     Types: Cigarettes   • Smokeless tobacco: Never Used   Vaping Use   • Vaping Use: Never used   Substance and Sexual Activity   • Alcohol use: Not Currently   • Drug use: No     Comment: methadone   • Sexual activity: None   Other Topics Concern   • None   Social History Narrative   • None     Social Determinants of Health     Financial Resource Strain: Not on file   Food Insecurity: Not on file   Transportation Needs: Not on file   Physical Activity: Not on file   Stress: Not on file   Social Connections: Not on file   Intimate Partner Violence: Not on file   Housing Stability: Not on file        Current Outpatient Medications:   •  acetaminophen (TYLENOL) 325 mg tablet, Take 3 tablets (975 mg total) by mouth every 8 (eight) hours (Patient taking differently: Take 650 mg by mouth 3 (three) times a day as needed for moderate pain), Disp: , Rfl: 0  •  albuterol (PROVENTIL HFA,VENTOLIN HFA) 90 mcg/act inhaler, Inhale 2 puffs every 4 (four) hours as needed for wheezing, Disp: , Rfl: 0  •  aspirin 81 mg chewable tablet, Chew 81 mg daily, Disp: , Rfl:   •  atorvastatin (LIPITOR) 20 mg tablet, Take 1 tablet (20 mg total) by mouth daily, Disp: 90 tablet, Rfl: 3  •  BinaxNOW COVID-19 Ag Home Test KIT, , Disp: , Rfl:   •  bisacodyl (DULCOLAX) 5 mg EC tablet, Take as instructed by GI office for bowel prep for colonoscopy, Disp: 2 tablet, Rfl: 0  •  Blood Glucose Monitoring Suppl (OneTouch Verio) w/Device KIT, Use to test blood sugars 3 times daily, Disp: 1 kit, Rfl: 0  •  cholecalciferol (VITAMIN D3) 1,000 units tablet, Take 2 tablets (2,000 Units total) by mouth daily, Disp: 12 tablet, Rfl: 0  •  Continuous Blood Gluc  (FreeStyle Ke 2 Gore) JOSEPH, Use as directed, Disp: 1 each, Rfl: 0  •  Continuous Blood Gluc Sensor (FreeStyle Ke 2 Sensor) MISC, Change every 14 days, Disp: 6 each, Rfl: 2  •  Diclofenac Sodium (VOLTAREN) 1 %, Apply 4 g topically 4 (four) times a day (Patient taking differently: Apply 4 g topically 4 (four) times a day as needed), Disp: , Rfl: 0  •  DULoxetine (CYMBALTA) 60 mg delayed release capsule, Take 1 capsule (60 mg total) by mouth daily at bedtime, Disp: 30 capsule, Rfl: 2  •  Eliquis 5 MG, Take 1 tablet by mouth twice daily, Disp: 180 tablet, Rfl: 0  •  famotidine (PEPCID) 20 mg tablet, Take 1 tablet (20 mg total) by mouth 2 (two) times a day, Disp: 30 tablet, Rfl: 8  •  fluticasone (FLONASE) 50 mcg/act nasal spray, 1 spray into each nostril daily (Patient taking differently: 1 spray into each nostril if needed), Disp: 16 g, Rfl: 2  •  glucose blood test strip, Use 1 each 3 (three) times daily after meals Use as instructed, Disp: 270 each, Rfl: 3  •  insulin glargine (LANTUS) 100 units/mL subcutaneous injection, Inject 27 Units under the skin daily at bedtime, Disp: 10 mL, Rfl: 3  •  insulin lispro (HumaLOG KwikPen) 100 units/mL injection pen, Blood Glucose 150 - 224: 4 Unit of Insulin Blood Glucose 225 - 299: 5 Units of Insulin Blood Glucose 300 - 374: 6 Units of Insulin Blood Glucose 375 - 449: 7 Units of Insulin Blood Glucose greater than or equal to 450: 8 Units of Insulin Max of 24 units per day, Disp: 15 mL, Rfl: 3  •  Insulin Pen Needle 31G X 5 MM MISC, Use daily Pt to inject 4 X daily, Disp: 100 each, Rfl: 5  •  Insulin Pen Needle 31G X 5 MM MISC, 30 units sq 2X daily, Disp: 100 each, Rfl: 3  •  Insulin Syringe-Needle U-100 (BD Veo Insulin Syringe U/F) 31G X 15/64" 0 5 ML MISC, Inject under the skin 3 (three) times a day Use as directed, Disp: 300 each, Rfl: 5  •  Janumet  MG per tablet, TAKE 1 TABLET BY MOUTH TWICE DAILY WITH MEALS, Disp: 180 tablet, Rfl: 0  •  Jardiance 25 MG TABS, TAKE 1 TABLET BY MOUTH ONCE DAILY IN THE MORNING, Disp: 30 tablet, Rfl: 0  •  levocetirizine (XYZAL) 5 MG tablet, TAKE 1 TABLET BY MOUTH ONCE DAILY IN THE EVENING, Disp: 90 tablet, Rfl: 0  •  lidocaine (LMX) 4 % cream, Apply topically 4 (four) times a day as needed for mild pain, Disp: 30 g, Rfl: 0  •  lisinopril (ZESTRIL) 10 mg tablet, Take 1 tablet by mouth once daily, Disp: 90 tablet, Rfl: 0  •  magnesium oxide (MAG-OX) 400 mg, Take 1 tablet (400 mg total) by mouth 2 (two) times a day, Disp: 60 tablet, Rfl: 0  •  menthol-methyl salicylate (BENGAY) 09-35 % cream, Apply topically 4 (four) times a day as needed (torticollis), Disp: , Rfl: 0  •  methadone (DOLOPHINE) 10 mg tablet, Take 70 mg by mouth daily,, Disp: , Rfl:   •  multivitamin-minerals (CENTRUM ADULTS) tablet, Take 1 tablet by mouth daily, Disp: , Rfl: 0  •  naloxone (NARCAN) 4 mg/0 1 mL nasal spray, Administer 1 spray into a nostril   If no response after 2-3 minutes, give another dose in the other nostril using a new spray , Disp: 1 each, Rfl: 1  •  nicotine (NICODERM CQ) 21 mg/24 hr TD 24 hr patch, Place 1 patch on the skin every 24 hours, Disp: 28 patch, Rfl: 0  •  ondansetron (ZOFRAN-ODT) 4 mg disintegrating tablet, DISSOLVE 1 TABLET IN MOUTH EVERY 6 HOURS AS NEEDED FOR NAUSEA FOR VOMITING, Disp: 40 tablet, Rfl: 0  •  pantoprazole (PROTONIX) 40 mg tablet, Take 1 tablet (40 mg total) by mouth daily, Disp: 30 tablet, Rfl: 3  •  polyethylene glycol (GOLYTELY) 4000 mL solution, Take as instructed by GI office for bowel prep, Disp: 4000 mL, Rfl: 0  •  silver sulfadiazine (SILVADENE,SSD) 1 % cream, Apply topically daily Apply as directed to left heel ulcer daily with each dressing change , Disp: 25 g, Rfl: 2  Family History   Problem Relation Age of Onset   • Diabetes Mother    • Hypertension Father    • Leukemia Father    • Acute lymphoblastic leukemia Father    • Cancer Sister       Review of Systems   All other systems reviewed and are negative  Allergies:  Varenicline      Objective:  /87   Pulse 99   Temp 98 7 °F (37 1 °C)   Resp 18     Physical Exam  Vitals reviewed  Feet:      Left foot:      Skin integrity: Ulcer present  Comments: Left hallux proximal interphalangeal joint ulcer probable to bone  Post debridement the ulcer was mixture of 50% granular and 50% fibrotic with minimal bleeding to the wound  Clinically the wound appears stable  Wound 10/25/22 Diabetic Ulcer Toe (Comment  which one) Anterior; Left (Active)   Wound Image   11/01/22 1519   Wound Description Black; Brown;Yellow; Exposed bone 11/01/22 1503   Renuka-wound Assessment Erythema 11/01/22 1503   Wound Length (cm) 1 cm 11/01/22 1503   Wound Width (cm) 1 cm 11/01/22 1503   Wound Depth (cm) 0 1 cm 11/01/22 1503   Wound Surface Area (cm^2) 1 cm^2 11/01/22 1503   Wound Volume (cm^3) 0 1 cm^3 11/01/22 1503   Calculated Wound Volume (cm^3) 0 1 cm^3 11/01/22 1503   Change in Wound Size % -42 86 11/01/22 1503   Drainage Amount Scant 11/01/22 1503   Drainage Description Serous 11/01/22 1503   Non-staged Wound Description Full thickness 11/01/22 1503   Treatments Irrigation with NSS 11/01/22 1503   Patient Tolerance Tolerated well 10/25/22 1449                         Debridement   Wound 10/25/22 Diabetic Ulcer Toe (Comment  which one) Anterior; Left    Universal Protocol:  Consent: Verbal consent obtained  Risks and benefits: risks, benefits and alternatives were discussed  Consent given by: patient  Patient understanding: patient states understanding of the procedure being performed  Patient identity confirmed: verbally with patient      Performed by: physician  Debridement type: surgical  Level of debridement: subcutaneous tissue  Pain control: lidocaine 4%  Post-debridement measurements  Length (cm): 1 1  Width (cm): 1 1  Depth (cm): 0 3  Percent debrided: 100%  Surface Area (cm^2): 1 21  Area debrided (cm^2): 1 21  Volume (cm^3): 0 36  Tissue and other material debrided: subcutaneous tissue  Devitalized tissue debrided: biofilm, fibrin and slough  Instrument(s) utilized: curette  Bleeding: small  Hemostasis obtained with: pressure  Procedural pain (0-10): insensate  Post-procedural pain: insensate   Response to treatment: procedure was tolerated well                   Wound Instructions:  Orders Placed This Encounter   Procedures   • Wound cleansing and dressings     Wash your hands with soap and water  Remove old dressing, discard into plastic bag and place in trash  Cleanse the wound with normal saline prior to applying a clean dressing  Do not use tissue or cotton balls  Do not scrub the wound  Pat dry using gauze  Shower yes DO NOT GET DRESSING WET  Apply silver alginate to left great toe ulcer   Cover with gauze  Secure with kenya and tape  Change dressing three times per week and as needed for excessive drainage or leakage  Make appt  With vascular provider     Follow up one week       Standing Status:   Future     Standing Expiration Date:   11/1/2023   • Referral to [address] [place] [address]     Standing Status:   Future     Standing Expiration Date:   11/1/2023     Referral Priority:   Routine     Referral Type:   Home Health     Referral Reason:   Specialty Services Required     Referred to Provider:   [facility]     Requested Specialty: Home Health Services     Number of Visits Requested:   1     Expiration Date:   11/1/2023         Annalee Blind      Portions of the record may have been created with voice recognition software  Occasional wrong word or "sound a like" substitutions may have occurred due to the inherent limitations of voice recognition software  Read the chart carefully and recognize, using context, where substitutions have occurred

## 2022-11-01 NOTE — PATIENT INSTRUCTIONS
Orders Placed This Encounter   Procedures    Wound cleansing and dressings     Wash your hands with soap and water  Remove old dressing, discard into plastic bag and place in trash  Cleanse the wound with normal saline prior to applying a clean dressing  Do not use tissue or cotton balls  Do not scrub the wound  Pat dry using gauze  Shower yes DO NOT GET DRESSING WET  Apply silver alginate to left great toe ulcer  Cover with gauze  Secure with kenya and tape  Change dressing three times per week and as needed for excessive drainage or leakage  Make appt  With vascular provider     Follow up one week       Standing Status:   Future     Standing Expiration Date:   11/1/2023    Referral to [address] [place] [address]     Standing Status:   Future     Standing Expiration Date:   11/1/2023     Referral Priority:   Routine     Referral Type:   Home Health     Referral Reason:   Specialty Services Required     Referred to Provider:   [facility]     Requested Specialty:   [address]     Number of Visits Requested:   1     Expiration Date:   11/1/2023

## 2022-11-04 ENCOUNTER — HOSPITAL ENCOUNTER (OUTPATIENT)
Dept: MRI IMAGING | Facility: HOSPITAL | Age: 62
End: 2022-11-04
Attending: STUDENT IN AN ORGANIZED HEALTH CARE EDUCATION/TRAINING PROGRAM

## 2022-11-04 DIAGNOSIS — L97.526 DIABETIC ULCER OF TOE OF LEFT FOOT ASSOCIATED WITH TYPE 2 DIABETES MELLITUS, WITH BONE INVOLVEMENT WITHOUT EVIDENCE OF NECROSIS (HCC): ICD-10-CM

## 2022-11-04 DIAGNOSIS — E11.621 DIABETIC ULCER OF TOE OF LEFT FOOT ASSOCIATED WITH TYPE 2 DIABETES MELLITUS, WITH BONE INVOLVEMENT WITHOUT EVIDENCE OF NECROSIS (HCC): ICD-10-CM

## 2022-11-04 DIAGNOSIS — E11.49 OTHER DIABETIC NEUROLOGICAL COMPLICATION ASSOCIATED WITH TYPE 2 DIABETES MELLITUS (HCC): ICD-10-CM

## 2022-11-10 ENCOUNTER — OFFICE VISIT (OUTPATIENT)
Dept: WOUND CARE | Facility: CLINIC | Age: 62
End: 2022-11-10

## 2022-11-10 ENCOUNTER — HOME HEALTH ADMISSION (OUTPATIENT)
Dept: HOME HEALTH SERVICES | Facility: HOME HEALTHCARE | Age: 62
End: 2022-11-10

## 2022-11-10 VITALS
TEMPERATURE: 97.4 F | RESPIRATION RATE: 20 BRPM | SYSTOLIC BLOOD PRESSURE: 120 MMHG | HEART RATE: 101 BPM | DIASTOLIC BLOOD PRESSURE: 79 MMHG

## 2022-11-10 DIAGNOSIS — L97.524 DIABETIC ULCER OF TOE OF LEFT FOOT ASSOCIATED WITH TYPE 2 DIABETES MELLITUS, WITH NECROSIS OF BONE (HCC): Primary | ICD-10-CM

## 2022-11-10 DIAGNOSIS — E11.65 TYPE 2 DIABETES MELLITUS WITH HYPERGLYCEMIA, WITH LONG-TERM CURRENT USE OF INSULIN (HCC): Chronic | ICD-10-CM

## 2022-11-10 DIAGNOSIS — L97.528 DIABETIC ULCER OF TOE OF LEFT FOOT ASSOCIATED WITH TYPE 2 DIABETES MELLITUS, WITH OTHER ULCER SEVERITY (HCC): ICD-10-CM

## 2022-11-10 DIAGNOSIS — M86.9 TOE OSTEOMYELITIS, LEFT (HCC): ICD-10-CM

## 2022-11-10 DIAGNOSIS — E11.621 DIABETIC ULCER OF TOE OF LEFT FOOT ASSOCIATED WITH TYPE 2 DIABETES MELLITUS, WITH BONE INVOLVEMENT WITHOUT EVIDENCE OF NECROSIS (HCC): Primary | ICD-10-CM

## 2022-11-10 DIAGNOSIS — E11.621 DIABETIC ULCER OF TOE OF LEFT FOOT ASSOCIATED WITH TYPE 2 DIABETES MELLITUS, WITH NECROSIS OF BONE (HCC): Primary | ICD-10-CM

## 2022-11-10 DIAGNOSIS — L97.526 DIABETIC ULCER OF TOE OF LEFT FOOT ASSOCIATED WITH TYPE 2 DIABETES MELLITUS, WITH BONE INVOLVEMENT WITHOUT EVIDENCE OF NECROSIS (HCC): Primary | ICD-10-CM

## 2022-11-10 DIAGNOSIS — E11.621 DIABETIC ULCER OF TOE OF LEFT FOOT ASSOCIATED WITH TYPE 2 DIABETES MELLITUS, WITH OTHER ULCER SEVERITY (HCC): ICD-10-CM

## 2022-11-10 DIAGNOSIS — Z79.4 TYPE 2 DIABETES MELLITUS WITH HYPERGLYCEMIA, WITH LONG-TERM CURRENT USE OF INSULIN (HCC): Chronic | ICD-10-CM

## 2022-11-10 RX ORDER — INSULIN GLARGINE 100 [IU]/ML
27 INJECTION, SOLUTION SUBCUTANEOUS
Qty: 10 ML | Refills: 3 | Status: SHIPPED | OUTPATIENT
Start: 2022-11-10

## 2022-11-10 RX ORDER — LIDOCAINE 40 MG/G
CREAM TOPICAL ONCE
Status: COMPLETED | OUTPATIENT
Start: 2022-11-10 | End: 2022-11-10

## 2022-11-10 RX ADMIN — LIDOCAINE: 40 CREAM TOPICAL at 11:46

## 2022-11-10 NOTE — TELEPHONE ENCOUNTER
I placed the referral  I also want him to follow up with the endocrinologist as his diabetes continues to have complications and he would benefit from the specialist involvement   Thanks

## 2022-11-10 NOTE — LETTER
[facility] [address] [address]  Phone#  [phone]  Fax#  [phone]    Patient:  Mayito Goff  YOB: 1960  Phone:  [phone]  Date of Visit:  11/10/2022    Orders Placed This Encounter   Procedures   • Wound cleansing and dressings     Wash your hands with soap and water  Remove old dressing, discard into plastic bag and place in trash  Cleanse the ulcer with normal saline prior to applying a clean dressing  Do not use tissue or cotton balls  Do not scrub the ulceer  Pat dry using gauze  Shower yes DO NOT GET DRESSING WET  Apply silver alginate to left great toe ulcer   Cover with gauze  Secure with kenya and tape  Change dressing three times per week and as needed for excessive drainage or leakage  Follow up one week       Standing Status:   Future     Standing Expiration Date:   11/10/2023         Electronically signed by Karie Lindsey DPM

## 2022-11-10 NOTE — PATIENT INSTRUCTIONS
Orders Placed This Encounter   Procedures    Wound cleansing and dressings     Wash your hands with soap and water  Remove old dressing, discard into plastic bag and place in trash  Cleanse the ulcer with normal saline prior to applying a clean dressing  Do not use tissue or cotton balls  Do not scrub the ulceer  Pat dry using gauze  Shower yes DO NOT GET DRESSING WET  Apply silver alginate to left great toe ulcer  Cover with gauze  Secure with kenya and tape  Change dressing three times per week and as needed for excessive drainage or leakage  Follow up one week       Standing Status:   Future     Standing Expiration Date:   11/10/2023

## 2022-11-10 NOTE — PROGRESS NOTES
Patient ID: Raimundo Andres is a 57 y o  male Date of Birth 1960     Diagnosis:  1  Diabetic ulcer of toe of left foot associated with type 2 diabetes mellitus, with bone involvement without evidence of necrosis (HCC)  -     lidocaine (LMX) 4 % cream  -     Wound cleansing and dressings; Future    2  Toe osteomyelitis, left (HCC)       Diagnosis ICD-10-CM Associated Orders   1  Diabetic ulcer of toe of left foot associated with type 2 diabetes mellitus, with bone involvement without evidence of necrosis (HCC)  E11 621 lidocaine (LMX) 4 % cream    L97 526 Wound cleansing and dressings   2  Toe osteomyelitis, left (HCC)  M86 9         Assessment & Plan:  1  Left toe OM   2  Diabetic ulcer left hallux    2  Diabetic neuropathy   3  PAD  4  Hx of Left AKA      Plan:     - MRI reviewed with patient: Dorsal skin ulceration at the 1st interphalangeal joint with mild ill-defined T1 marrow replacement signal across the 1st interphalangeal joint with tiny joint effusion all concerning for osteomyelitis and septic arthropathy  - Vascular surgery referral placed, pt has appointment next week   - Continue LWC for now with maxorb Ag DSD    - Discussed tight glycemic control and proper protein intake for wound healing    - Continue to monitor for clinical signs of infection, if present please call office immediately or visit near by ER, currently wound is not acutely infected for which patient does not require PO antibiotics  - Informed patient he is at high risk of proximal limb amputation       Chief Complaint   Patient presents with   • Follow Up Wound Care Visit           Subjective:   Presents for continued wound treatment with nephew  No other complaints         The following portions of the patient's history were reviewed and updated as appropriate:   Patient Active Problem List   Diagnosis   • Chronic hepatitis C without hepatic coma ([facility] [address] )   • Essential hypertension   • Hyperlipidemia associated with type 2 diabetes mellitus ([place] [address] )   • Methadone maintenance therapy patient ([place] [address] )   • Type 2 diabetes mellitus with diabetic polyneuropathy ([facility])   • Diabetic ulcer of left heel associated with type 2 diabetes mellitus, limited to breakdown of skin ([facility])   • Bilateral lower extremity edema   • Positive depression screening   • Mononeuropathy, unspecified   • Mixed hyperlipidemia   • Proteinuria   • Vitamin D deficiency   • Type 2 diabetes mellitus, with long-term current use of insulin ([facility])   • Acute respiratory failure with hypoxia ([facility])   • Hematuria   • Aortic thrombus ([facility])   • Prolonged Q-T interval on ECG   • Empyema lung ([facility])   • S/P AKA (above knee amputation), right ([facility])   • Cervical radiculopathy   • Chronic bilateral low back pain   • Above-knee amputation of right lower extremity ([facility])   • Drug-induced constipation   • Moderate protein-calorie malnutrition ([facility])   • Chronic pain syndrome   • Chronic hyponatremia   • Depression   • History of COVID-19   • Pulmonary fibrosis ([facility])   • Foot drop, left   • PAD (peripheral artery disease) ([facility])     Past Medical History:   Diagnosis Date   • Arthritis    • Diabetes mellitus ([place] [address] )    • GERD (gastroesophageal reflux disease)    • Hypercholesteremia    • Hypertension    • Ischemia of right lower extremity with suspected rhabdomyolysis 2/6/2021   • Left Hydropneumothorax 2/10/2021   • Methadone use    • Pneumonia due to COVID-19 virus 1/11/2021   • Subacute osteomyelitis of right femur ([place] [address] ) 9/13/2021     Past Surgical History:   Procedure Laterality Date   • 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    Procedure: AMPUTATION ABOVE KNEE (AKA);   Surgeon: Martha Major MD;  [facility];  Service: Vascular   • AMPUTATION ABOVE KNEE (AKA) Right 1/18/2021    Procedure: AMPUTATION ABOVE KNEE (AKA) FORMALIZATION,  R AKA wound washout, wound closure;  Surgeon: Martha Major MD;  [facility];  Service: Vascular   • ARTERIOGRAM Right 1/13/2021    Procedure: ARTERIOGRAM;  Surgeon: Wayne Talavera DO;  [facility];  Service: Vascular   • IR CHEST TUBE PLACEMENT  2/10/2021   • IR LOWER EXTREMITY ANGIOGRAM  1/14/2021   • THROMBECTOMY W/ EMBOLECTOMY Right 1/13/2021    Procedure: EMBOLECTOMY/THROMBECTOMY LOWER EXTREMITY;  Surgeon: Wayne Talavera DO;  [facility];  Service: Vascular     Social History     Socioeconomic History   • Marital status:      Spouse name: None   • Number of children: None   • Years of education: None   • Highest education level: None   Occupational History   • None   Tobacco Use   • Smoking status: Current Every Day Smoker     Packs/day: 0 50     Types: Cigarettes   • Smokeless tobacco: Never Used   Vaping Use   • Vaping Use: Never used   Substance and Sexual Activity   • Alcohol use: Not Currently   • Drug use: No     Comment: methadone   • Sexual activity: None   Other Topics Concern   • None   Social History Narrative   • None     Social Determinants of Health     Financial Resource Strain: Not on file   Food Insecurity: Not on file   Transportation Needs: Not on file   Physical Activity: Not on file   Stress: Not on file   Social Connections: Not on file   Intimate Partner Violence: Not on file   Housing Stability: Not on file        Current Outpatient Medications:   •  acetaminophen (TYLENOL) 325 mg tablet, Take 3 tablets (975 mg total) by mouth every 8 (eight) hours (Patient taking differently: Take 650 mg by mouth 3 (three) times a day as needed for moderate pain), Disp: , Rfl: 0  •  albuterol (PROVENTIL HFA,VENTOLIN HFA) 90 mcg/act inhaler, Inhale 2 puffs every 4 (four) hours as needed for wheezing, Disp: , Rfl: 0  •  aspirin 81 mg chewable tablet, Chew 81 mg daily, Disp: , Rfl:   •  atorvastatin (LIPITOR) 20 mg tablet, Take 1 tablet (20 mg total) by mouth daily, Disp: 90 tablet, Rfl: 3  •  BinaxNOW COVID-19 Ag Home Test KIT, , Disp: , Rfl:   •  bisacodyl (DULCOLAX) 5 mg EC tablet, Take as instructed by GI office for bowel prep for colonoscopy, Disp: 2 tablet, Rfl: 0  •  Blood Glucose Monitoring Suppl (OneTouch Verio) w/Device KIT, Use to test blood sugars 3 times daily, Disp: 1 kit, Rfl: 0  •  cholecalciferol (VITAMIN D3) 1,000 units tablet, Take 2 tablets (2,000 Units total) by mouth daily, Disp: 12 tablet, Rfl: 0  •  Continuous Blood Gluc  (FreeStyle Ke 2 [place]) JOSEPH, Use as directed, Disp: 1 each, Rfl: 0  •  Continuous Blood Gluc Sensor (FreeStyle Ke 2 Sensor) MISC, Change every 14 days, Disp: 6 each, Rfl: 2  •  Diclofenac Sodium (VOLTAREN) 1 %, Apply 4 g topically 4 (four) times a day (Patient taking differently: Apply 4 g topically 4 (four) times a day as needed), Disp: , Rfl: 0  •  DULoxetine (CYMBALTA) 60 mg delayed release capsule, Take 1 capsule (60 mg total) by mouth daily at bedtime, Disp: 30 capsule, Rfl: 2  •  Eliquis 5 MG, Take 1 tablet by mouth twice daily, Disp: 180 tablet, Rfl: 0  •  famotidine (PEPCID) 20 mg tablet, Take 1 tablet (20 mg total) by mouth 2 (two) times a day, Disp: 30 tablet, Rfl: 8  •  fluticasone (FLONASE) 50 mcg/act nasal spray, 1 spray into each nostril daily (Patient taking differently: 1 spray into each nostril if needed), Disp: 16 g, Rfl: 2  •  glucose blood test strip, Use 1 each 3 (three) times daily after meals Use as instructed, Disp: 270 each, Rfl: 3  •  insulin glargine (LANTUS) 100 units/mL subcutaneous injection, Inject 27 Units under the skin daily at bedtime, Disp: 10 mL, Rfl: 3  •  insulin lispro (HumaLOG KwikPen) 100 units/mL injection pen, Blood Glucose 150 - 224: 4 Unit of Insulin Blood Glucose 225 - 299: 5 Units of Insulin Blood Glucose 300 - 374: 6 Units of Insulin Blood Glucose 375 - 449: 7 Units of Insulin Blood Glucose greater than or equal to 450: 8 Units of Insulin Max of 24 units per day, Disp: 15 mL, Rfl: 3  •  Insulin Pen Needle 31G X 5 MM MISC, Use daily Pt to inject 4 X daily, Disp: 100 each, Rfl: 5  •  Insulin Pen Needle 31G X 5 MM MISC, 30 units sq 2X daily, Disp: 100 each, Rfl: 3  •  Insulin Syringe-Needle U-100 (BD Veo Insulin Syringe U/F) 31G X 15/64" 0 5 ML MISC, Inject under the skin 3 (three) times a day Use as directed, Disp: 300 each, Rfl: 5  •  Janumet  MG per tablet, TAKE 1 TABLET BY MOUTH TWICE DAILY WITH MEALS, Disp: 180 tablet, Rfl: 0  •  Jardiance 25 MG TABS, TAKE 1 TABLET BY MOUTH ONCE DAILY IN THE MORNING, Disp: 30 tablet, Rfl: 0  •  levocetirizine (XYZAL) 5 MG tablet, TAKE 1 TABLET BY MOUTH ONCE DAILY IN THE EVENING, Disp: 90 tablet, Rfl: 0  •  lidocaine (LMX) 4 % cream, Apply topically 4 (four) times a day as needed for mild pain, Disp: 30 g, Rfl: 0  •  lisinopril (ZESTRIL) 10 mg tablet, Take 1 tablet by mouth once daily, Disp: 90 tablet, Rfl: 0  •  magnesium oxide (MAG-OX) 400 mg, Take 1 tablet (400 mg total) by mouth 2 (two) times a day, Disp: 60 tablet, Rfl: 0  •  menthol-methyl salicylate (BENGAY) 96-99 % cream, Apply topically 4 (four) times a day as needed (torticollis), Disp: , Rfl: 0  •  methadone (DOLOPHINE) 10 mg tablet, Take 70 mg by mouth daily,, Disp: , Rfl:   •  multivitamin-minerals (CENTRUM ADULTS) tablet, Take 1 tablet by mouth daily, Disp: , Rfl: 0  •  naloxone (NARCAN) 4 mg/0 1 mL nasal spray, Administer 1 spray into a nostril   If no response after 2-3 minutes, give another dose in the other nostril using a new spray , Disp: 1 each, Rfl: 1  •  nicotine (NICODERM CQ) 21 mg/24 hr TD 24 hr patch, Place 1 patch on the skin every 24 hours, Disp: 28 patch, Rfl: 0  •  ondansetron (ZOFRAN-ODT) 4 mg disintegrating tablet, DISSOLVE 1 TABLET IN MOUTH EVERY 6 HOURS AS NEEDED FOR NAUSEA FOR VOMITING, Disp: 40 tablet, Rfl: 0  •  pantoprazole (PROTONIX) 40 mg tablet, Take 1 tablet (40 mg total) by mouth daily, Disp: 30 tablet, Rfl: 3  •  polyethylene glycol (GOLYTELY) 4000 mL solution, Take as instructed by GI office for bowel prep, Disp: 4000 mL, Rfl: 0  •  silver sulfadiazine (SILVADENE,SSD) 1 % cream, Apply topically daily Apply as directed to left heel ulcer daily with each dressing change , Disp: 25 g, Rfl: 2  No current facility-administered medications for this visit  Family History   Problem Relation Age of Onset   • Diabetes Mother    • Hypertension Father    • Leukemia Father    • Acute lymphoblastic leukemia Father    • Cancer Sister       Review of Systems   All other systems reviewed and are negative  Allergies:  Varenicline      Objective:  /79   Pulse 101   Temp (!) 97 4 °F (36 3 °C)   Resp 20     Physical Exam  Vitals reviewed  Feet:      Left foot:      Skin integrity: Ulcer present  Comments: Left hallux proximal interphalangeal joint with exposed bone  The wound is mixture of fibrotic and granular  Clinically the wound appears stable  No purulent drainage noted  Wound 10/25/22 Diabetic Ulcer Toe (Comment  which one) Anterior; Left (Active)   Wound Image   11/10/22 1138   Wound Description Exposed bone;Pink;Yellow 11/10/22 1138   Renuka-wound Assessment Erythema 11/10/22 1138   Wound Length (cm) 0 9 cm 11/10/22 1138   Wound Width (cm) 1 cm 11/10/22 1138   Wound Depth (cm) 0 1 cm 11/10/22 1138   Wound Surface Area (cm^2) 0 9 cm^2 11/10/22 1138   Wound Volume (cm^3) 0 09 cm^3 11/10/22 1138   Calculated Wound Volume (cm^3) 0 09 cm^3 11/10/22 1138   Change in Wound Size % -28 57 11/10/22 1138   Drainage Amount Small 11/10/22 1138   Drainage Description Serous 11/10/22 1138   Non-staged Wound Description Full thickness 11/10/22 1138   Treatments Cleansed 11/10/22 1138   Patient Tolerance Tolerated well 11/10/22 1138                         Procedures             Wound Instructions:  Orders Placed This Encounter   Procedures   • Wound cleansing and dressings     Wash your hands with soap and water  Remove old dressing, discard into plastic bag and place in trash  Cleanse the ulcer with normal saline prior to applying a clean dressing  Do not use tissue or cotton balls   Do not scrub the lynda  Pat dry using gauze  Shower yes DO NOT GET DRESSING WET  Apply silver alginate to left great toe ulcer   Cover with gauze  Secure with kenya and tape  Change dressing three times per week and as needed for excessive drainage or leakage  Follow up one week  Standing Status:   Future     Standing Expiration Date:   11/10/2023         Lee Ann Shaffer DPM      Portions of the record may have been created with voice recognition software  Occasional wrong word or "sound a like" substitutions may have occurred due to the inherent limitations of voice recognition software  Read the chart carefully and recognize, using context, where substitutions have occurred

## 2022-11-10 NOTE — TELEPHONE ENCOUNTER
Patient's nephew called and stated he needs a Home Health  Patients needs VNA to help with his infected big toe, patient has an infection down to bone  They would come in to help with the wound dressings

## 2022-11-13 ENCOUNTER — HOME CARE VISIT (OUTPATIENT)
Dept: HOME HEALTH SERVICES | Facility: HOME HEALTHCARE | Age: 62
End: 2022-11-13

## 2022-11-13 NOTE — CASE COMMUNICATION
[facility] has admitted your patient to [address] [place] [address] service with the following disciplines:      SN  This report is informational only, no responses is needed  Primary focus of home health care    INTEGUMENTARY  Patient stated goals of care    WOUND TO HEAL  Anticipated visit pattern and next visit date  Dara Soulier 2W9  NEXT VISIT TUESDAY  See medication list - meds in home differ from AVS  ALL MEDS AT HOME  NEPHEW MANAGES MEDS  Significant cl inical findings  Dara Soulier PAIN ON LEFT FOOT  EDEMA AND REDNESS NOTES  Potential barriers to goal achievement  Dara Soulier RIGHT BKA, FATIGUE  Thank you for allowing us to participate in the care of your patient

## 2022-11-14 VITALS
DIASTOLIC BLOOD PRESSURE: 72 MMHG | OXYGEN SATURATION: 97 % | SYSTOLIC BLOOD PRESSURE: 126 MMHG | TEMPERATURE: 97.2 F | RESPIRATION RATE: 18 BRPM | HEART RATE: 82 BPM

## 2022-11-15 ENCOUNTER — HOME CARE VISIT (OUTPATIENT)
Dept: HOME HEALTH SERVICES | Facility: HOME HEALTHCARE | Age: 62
End: 2022-11-15

## 2022-11-15 VITALS
DIASTOLIC BLOOD PRESSURE: 76 MMHG | RESPIRATION RATE: 16 BRPM | HEART RATE: 74 BPM | OXYGEN SATURATION: 97 % | SYSTOLIC BLOOD PRESSURE: 118 MMHG | TEMPERATURE: 97.6 F

## 2022-11-16 ENCOUNTER — OFFICE VISIT (OUTPATIENT)
Dept: VASCULAR SURGERY | Facility: CLINIC | Age: 62
End: 2022-11-16

## 2022-11-16 VITALS — DIASTOLIC BLOOD PRESSURE: 76 MMHG | SYSTOLIC BLOOD PRESSURE: 122 MMHG | HEART RATE: 76 BPM

## 2022-11-16 DIAGNOSIS — E11.49 OTHER DIABETIC NEUROLOGICAL COMPLICATION ASSOCIATED WITH TYPE 2 DIABETES MELLITUS (HCC): ICD-10-CM

## 2022-11-16 DIAGNOSIS — L97.526 DIABETIC ULCER OF TOE OF LEFT FOOT ASSOCIATED WITH TYPE 2 DIABETES MELLITUS, WITH BONE INVOLVEMENT WITHOUT EVIDENCE OF NECROSIS (HCC): ICD-10-CM

## 2022-11-16 DIAGNOSIS — E11.621 DIABETIC ULCER OF TOE OF LEFT FOOT ASSOCIATED WITH TYPE 2 DIABETES MELLITUS, WITH BONE INVOLVEMENT WITHOUT EVIDENCE OF NECROSIS (HCC): ICD-10-CM

## 2022-11-16 DIAGNOSIS — S78.111A ABOVE-KNEE AMPUTATION OF RIGHT LOWER EXTREMITY (HCC): ICD-10-CM

## 2022-11-16 DIAGNOSIS — I70.299 ATHEROSCLEROSIS OF ARTERY OF EXTREMITY WITH ULCERATION (HCC): Primary | ICD-10-CM

## 2022-11-16 DIAGNOSIS — L97.909 ATHEROSCLEROSIS OF ARTERY OF EXTREMITY WITH ULCERATION (HCC): Primary | ICD-10-CM

## 2022-11-16 PROBLEM — E11.40 DIABETIC NEUROPATHY (HCC): Status: ACTIVE | Noted: 2022-11-16

## 2022-11-16 RX ORDER — CHLORHEXIDINE GLUCONATE 0.12 MG/ML
15 RINSE ORAL ONCE
OUTPATIENT
Start: 2022-11-28 | End: 2022-11-16

## 2022-11-16 NOTE — PROGRESS NOTES
Assessment/Plan:    PAD (peripheral artery disease) ([facility] [address] )  28-year-old male, smoker with HTN, HLD, dm type 2, GERD, chronic hepatitis C, methadone use, COVID-19 pneumonia January 2021 c/b acute respiratory failure requiring intubation, fungal empyema,  acute nonocclusive R FV DVT and PE 1/12/21 and acute critical RLE limb ischemia 2/2 aortic thrombus, s/p R SFA/PFA/pop/tibial embolectomy by Dr Sara Hernandez 1/13/21 followed by R guillotine amputation 1/14/21 and formalization of R AKA by Dr Jean Limon 1/18/21  He has new left first toe ulcer over past 3 weeks or so  He had doppler which shows worsening arterial flow with SFA stenosis and TP trunk occlusion  He will benefit angiogram with intervention to improve flow to heal the wound  Risks of bleeding and limb loss were discussed  Operative Scheduling Information:    Hospital:  [facility]    Physician:  Digna    Surgery: Left leg angiogram with intervention    Urgency:  Urgent: 2 wk    Level:  Level 2: Outpatients to be scheduled for surgery with time dependent medical necessity within 2 weeks    Case Length:  Normal    Post-op Bed:  Outpatient    OR Table:  Discovery    Equipment Needs:  None    Medication Instructions:  Aspirin:   Continue (do not hold)  Eliquis:  Hold for 1 days prior to procedure    Hydration:  no    Contrast Allergy:  no       Diagnoses and all orders for this visit:    Atherosclerosis of artery of extremity with ulceration ([facility] [address] )  -     Ambulatory Referral to Vascular Surgery  -     Case request operating room: LEFT LEG ANGIOGRAM WITH INTERVENTION; Standing  -     EKG 12 lead; Future  -     XR chest pa & lateral; Future  -     Basic metabolic panel; Future  -     CBC and Platelet; Future  -     Protime-INR;  Future  -     Case request operating room: LEFT LEG ANGIOGRAM WITH INTERVENTION    Diabetic ulcer of toe of left foot associated with type 2 diabetes mellitus, with bone involvement without evidence of necrosis ([facility] [address] )  - Ambulatory Referral to Vascular Surgery  -     Case request operating room: LEFT LEG ANGIOGRAM WITH INTERVENTION; Standing  -     EKG 12 lead; Future  -     XR chest pa & lateral; Future  -     Basic metabolic panel; Future  -     CBC and Platelet; Future  -     Protime-INR; Future  -     Case request operating room: LEFT LEG ANGIOGRAM WITH INTERVENTION    Other diabetic neurological complication associated with type 2 diabetes mellitus ([facility] [address] )  -     Ambulatory Referral to Vascular Surgery    Above-knee amputation of right lower extremity ([facility] [address] )    Other orders  -     Diet NPO; Sips with meds; Standing  -     Void on call to OR; Standing  -     Insert peripheral IV; Standing  -     Nursing Communication CHG bath, have staff wash entire body (neck down) per pre op bathing protocol  Routine, evening prior to, and day of surgery ; Standing  -     Nursing Communication Swab both nares with Povidone-Iodine solution, EXCLUDE if patient has shellfish/Iodine allergy  Routine, day of surgery, on call to OR ; Standing  -     chlorhexidine (PERIDEX) 0 12 % oral rinse 15 mL  -     Shower/scrub; Standing  -     Place sequential compression device; Standing        Subjective:      Patient ID: Priyanka Dhillon is a 57 y o  male  Pt is here to eval diabetic ulcer of great toe  Pt had latricia 10/11/22  Pt is taking ASA and Atorvastatin  Pt is a smoker and hasnt smoked in 2 days  HPI  New left toe ulcer started about 3 weeks ago  There was a blister  He has chronic numbness in left foot  He has cramps in left leg  The following portions of the patient's history were reviewed and updated as appropriate: allergies, current medications, past family history, past medical history, past social history, past surgical history and problem list     Review of Systems   Constitutional: Negative  HENT: Negative  Eyes: Negative  Respiratory: Negative  Cardiovascular: Negative  Gastrointestinal: Negative  Endocrine: Negative  Genitourinary: Negative  Musculoskeletal: Negative  Skin: Negative  Allergic/Immunologic: Negative  Neurological: Negative  Hematological: Negative  Psychiatric/Behavioral: Negative  I have reviewed the ROS as entered and made changes as necessary  Objective:      /76 (BP Location: Right arm, Patient Position: Sitting, Cuff Size: Standard)   Pulse 76          Physical Exam  Vitals and nursing note reviewed  Constitutional:       Appearance: Normal appearance  HENT:      Head: Normocephalic and atraumatic  Cardiovascular:      Rate and Rhythm: Normal rate and regular rhythm  Heart sounds: Normal heart sounds  Comments: Biphasic AT signal  Pulmonary:      Effort: Pulmonary effort is normal       Breath sounds: Normal breath sounds  Abdominal:      Palpations: Abdomen is soft  Musculoskeletal:      Left lower leg: Edema present  Skin:     General: Skin is warm  Capillary Refill: Capillary refill takes less than 2 seconds  Comments: Ulcer over left first toe   Neurological:      General: No focal deficit present  Mental Status: He is alert and oriented to person, place, and time     Psychiatric:         Mood and Affect: Mood normal          Behavior: Behavior normal

## 2022-11-16 NOTE — ASSESSMENT & PLAN NOTE
29-year-old male, smoker with HTN, HLD, dm type 2, GERD, chronic hepatitis C, methadone use, COVID-19 pneumonia January 2021 c/b acute respiratory failure requiring intubation, fungal empyema,  acute nonocclusive R FV DVT and PE 1/12/21 and acute critical RLE limb ischemia 2/2 aortic thrombus, s/p R SFA/PFA/pop/tibial embolectomy by Dr Sara Hernandez 1/13/21 followed by R guillotine amputation 1/14/21 and formalization of R AKA by Dr Jean Limon 1/18/21  He has new left first toe ulcer over past 3 weeks or so  He had doppler which shows worsening arterial flow with SFA stenosis and TP trunk occlusion  He will benefit angiogram with intervention to improve flow to heal the wound  Risks of bleeding and limb loss were discussed      Operative Scheduling Information:    Hospital:  [facility]    Physician:  Digna    Surgery: Left leg angiogram with intervention    Urgency:  Urgent: 2 wk    Level:  Level 2: Outpatients to be scheduled for surgery with time dependent medical necessity within 2 weeks    Case Length:  Normal    Post-op Bed:  Outpatient    OR Table:  Discovery    Equipment Needs:  None    Medication Instructions:  Aspirin:   Continue (do not hold)  Eliquis:  Hold for 1 days prior to procedure    Hydration:  no    Contrast Allergy:  no

## 2022-11-16 NOTE — H&P (VIEW-ONLY)
Assessment/Plan:    PAD (peripheral artery disease) ([facility] )  58-year-old male, smoker with HTN, HLD, dm type 2, GERD, chronic hepatitis C, methadone use, COVID-19 pneumonia January 2021 c/b acute respiratory failure requiring intubation, fungal empyema,  acute nonocclusive R FV DVT and PE 1/12/21 and acute critical RLE limb ischemia 2/2 aortic thrombus, s/p R SFA/PFA/pop/tibial embolectomy by Dr Ba Agarwal 1/13/21 followed by R guillotine amputation 1/14/21 and formalization of R AKA by Dr Chari Fabry 1/18/21  He has new left first toe ulcer over past 3 weeks or so  He had doppler which shows worsening arterial flow with SFA stenosis and TP trunk occlusion  He will benefit angiogram with intervention to improve flow to heal the wound  Risks of bleeding and limb loss were discussed  Operative Scheduling Information:    Hospital:  [facility]    Physician:  Digna    Surgery: Left leg angiogram with intervention    Urgency:  Urgent: 2 wk    Level:  Level 2: Outpatients to be scheduled for surgery with time dependent medical necessity within 2 weeks    Case Length:  Normal    Post-op Bed:  Outpatient    OR Table:  Chatosity    Equipment Needs:  None    Medication Instructions:  Aspirin:   Continue (do not hold)  Eliquis:  Hold for 1 days prior to procedure    Hydration:  no    Contrast Allergy:  no       Diagnoses and all orders for this visit:    Atherosclerosis of artery of extremity with ulceration ([facility] )  -     Ambulatory Referral to Vascular Surgery  -     Case request operating room: LEFT LEG ANGIOGRAM WITH INTERVENTION; Standing  -     EKG 12 lead; Future  -     XR chest pa & lateral; Future  -     Basic metabolic panel; Future  -     CBC and Platelet; Future  -     Protime-INR;  Future  -     Case request operating room: LEFT LEG ANGIOGRAM WITH INTERVENTION    Diabetic ulcer of toe of left foot associated with type 2 diabetes mellitus, with bone involvement without evidence of necrosis ([facility] [address] )  - Ambulatory Referral to Vascular Surgery  -     Case request operating room: LEFT LEG ANGIOGRAM WITH INTERVENTION; Standing  -     EKG 12 lead; Future  -     XR chest pa & lateral; Future  -     Basic metabolic panel; Future  -     CBC and Platelet; Future  -     Protime-INR; Future  -     Case request operating room: LEFT LEG ANGIOGRAM WITH INTERVENTION    Other diabetic neurological complication associated with type 2 diabetes mellitus ([facility] [address] )  -     Ambulatory Referral to Vascular Surgery    Above-knee amputation of right lower extremity ([facility] [address] )    Other orders  -     Diet NPO; Sips with meds; Standing  -     Void on call to OR; Standing  -     Insert peripheral IV; Standing  -     Nursing Communication CHG bath, have staff wash entire body (neck down) per pre op bathing protocol  Routine, evening prior to, and day of surgery ; Standing  -     Nursing Communication Swab both nares with Povidone-Iodine solution, EXCLUDE if patient has shellfish/Iodine allergy  Routine, day of surgery, on call to OR ; Standing  -     chlorhexidine (PERIDEX) 0 12 % oral rinse 15 mL  -     Shower/scrub; Standing  -     Place sequential compression device; Standing        Subjective:      Patient ID: Dre Paiz is a 57 y o  male  Pt is here to eval diabetic ulcer of great toe  Pt had latricia 10/11/22  Pt is taking ASA and Atorvastatin  Pt is a smoker and hasnt smoked in 2 days  HPI  New left toe ulcer started about 3 weeks ago  There was a blister  He has chronic numbness in left foot  He has cramps in left leg  The following portions of the patient's history were reviewed and updated as appropriate: allergies, current medications, past family history, past medical history, past social history, past surgical history and problem list     Review of Systems   Constitutional: Negative  HENT: Negative  Eyes: Negative  Respiratory: Negative  Cardiovascular: Negative  Gastrointestinal: Negative  Endocrine: Negative  Genitourinary: Negative  Musculoskeletal: Negative  Skin: Negative  Allergic/Immunologic: Negative  Neurological: Negative  Hematological: Negative  Psychiatric/Behavioral: Negative  I have reviewed the ROS as entered and made changes as necessary  Objective:      /76 (BP Location: Right arm, Patient Position: Sitting, Cuff Size: Standard)   Pulse 76          Physical Exam  Vitals and nursing note reviewed  Constitutional:       Appearance: Normal appearance  HENT:      Head: Normocephalic and atraumatic  Cardiovascular:      Rate and Rhythm: Normal rate and regular rhythm  Heart sounds: Normal heart sounds  Comments: Biphasic AT signal  Pulmonary:      Effort: Pulmonary effort is normal       Breath sounds: Normal breath sounds  Abdominal:      Palpations: Abdomen is soft  Musculoskeletal:      Left lower leg: Edema present  Skin:     General: Skin is warm  Capillary Refill: Capillary refill takes less than 2 seconds  Comments: Ulcer over left first toe   Neurological:      General: No focal deficit present  Mental Status: He is alert and oriented to person, place, and time     Psychiatric:         Mood and Affect: Mood normal          Behavior: Behavior normal

## 2022-11-16 NOTE — PATIENT INSTRUCTIONS
He has new left first toe ulcer over past 3 weeks or so  He had doppler which shows worsening arterial flow with SFA stenosis and TP trunk occlusion  He will benefit angiogram with intervention to improve flow to heal the wound  If that does not work we may need to do a bypass  Risks of bleeding and limb loss were discussed

## 2022-11-17 ENCOUNTER — OFFICE VISIT (OUTPATIENT)
Dept: WOUND CARE | Facility: CLINIC | Age: 62
End: 2022-11-17

## 2022-11-17 VITALS
HEART RATE: 99 BPM | SYSTOLIC BLOOD PRESSURE: 153 MMHG | RESPIRATION RATE: 20 BRPM | TEMPERATURE: 97.2 F | DIASTOLIC BLOOD PRESSURE: 80 MMHG

## 2022-11-17 DIAGNOSIS — E11.621 DIABETIC ULCER OF TOE OF LEFT FOOT ASSOCIATED WITH TYPE 2 DIABETES MELLITUS, WITH BONE INVOLVEMENT WITHOUT EVIDENCE OF NECROSIS (HCC): Primary | ICD-10-CM

## 2022-11-17 DIAGNOSIS — I73.9 PAD (PERIPHERAL ARTERY DISEASE) (HCC): ICD-10-CM

## 2022-11-17 DIAGNOSIS — L97.526 DIABETIC ULCER OF TOE OF LEFT FOOT ASSOCIATED WITH TYPE 2 DIABETES MELLITUS, WITH BONE INVOLVEMENT WITHOUT EVIDENCE OF NECROSIS (HCC): Primary | ICD-10-CM

## 2022-11-17 DIAGNOSIS — M86.9 TOE OSTEOMYELITIS, LEFT (HCC): ICD-10-CM

## 2022-11-17 RX ORDER — LIDOCAINE 40 MG/G
CREAM TOPICAL ONCE
Status: COMPLETED | OUTPATIENT
Start: 2022-11-17 | End: 2022-11-17

## 2022-11-17 RX ADMIN — LIDOCAINE: 40 CREAM TOPICAL at 11:21

## 2022-11-17 NOTE — PATIENT INSTRUCTIONS
Orders Placed This Encounter   Procedures    Wound cleansing and dressings     Left great toe     Wash your hands with soap and water  Remove old dressing, discard into plastic bag and place in trash  Cleanse the ulcer with normal saline prior to applying a clean dressing  Do not use tissue or cotton balls  Do not scrub the ulceer  Pat dry using gauze  Shower yes DO NOT GET DRESSING WET  Apply silver alginate to left great toe ulcer  Cover with gauze  Secure with kenya and tape  Change dressing three times per week and as needed for excessive drainage or leakage  Monitor for signs and symptons of infection such as  Redness,  Swelling,  Increased drainage, or malodor go to be emergency room to be evaluated  Elevate lower leg on pillow so no pressure is on your heel  Follow up at wound center after your vascular surgery   Please call us to make an appointment     Standing Status:   Future     Standing Expiration Date:   11/17/2023

## 2022-11-18 ENCOUNTER — PREP FOR PROCEDURE (OUTPATIENT)
Dept: VASCULAR SURGERY | Facility: CLINIC | Age: 62
End: 2022-11-18

## 2022-11-18 ENCOUNTER — TELEPHONE (OUTPATIENT)
Dept: VASCULAR SURGERY | Facility: CLINIC | Age: 62
End: 2022-11-18

## 2022-11-18 NOTE — PROGRESS NOTES
Patient ID: Martha Joshi is a 57 y o  male Date of Birth 1960     Diagnosis:  1  Diabetic ulcer of toe of left foot associated with type 2 diabetes mellitus, with bone involvement without evidence of necrosis (HCC)  -     lidocaine (LMX) 4 % cream  -     Wound cleansing and dressings; Future    2  Toe osteomyelitis, left ([facility] [address] )    3  PAD (peripheral artery disease) ([facility] )       Diagnosis ICD-10-CM Associated Orders   1  Diabetic ulcer of toe of left foot associated with type 2 diabetes mellitus, with bone involvement without evidence of necrosis (HCC)  E11 621 lidocaine (LMX) 4 % cream    L97 526 Wound cleansing and dressings      2  Toe osteomyelitis, left (HCC)  M86 9       3  PAD (peripheral artery disease) ([facility])  I73 9            Assessment & Plan:  1  Left Hallux OM   2  Diabetic ulcer left hallux  - haley 3  2  Diabetic neuropathy   3  PAD  4  Hx of Right AKA      Plan:     - Vascular surgery notes reviewed, planning for LLE agram next week  - Continue LWC for now with maxorb Ag DSD    - Discussed tight glycemic control and proper protein intake for wound healing    - Continue to monitor for clinical signs of infection, if present please call office immediately or visit near by ER, currently wound is not acutely infected for which patient does not require PO antibiotics  - Informed patient he is at high risk of proximal limb amputation  Chief Complaint   Patient presents with   • Follow Up Wound Care Visit     Left foot great toe ulcer              Subjective:   Patient presents for continued wound care treatments to the left hallux  No other complaints         The following portions of the patient's history were reviewed and updated as appropriate:   Patient Active Problem List   Diagnosis   • Chronic hepatitis C without hepatic coma ([facility] )   • Essential hypertension   • Hyperlipidemia associated with type 2 diabetes mellitus ([facility] )   • Methadone maintenance therapy patient ([place] [address] )   • Type 2 diabetes mellitus with diabetic polyneuropathy (HCC)   • Diabetic ulcer of toe of left foot associated with type 2 diabetes mellitus, with bone involvement without evidence of necrosis (HCC)   • Bilateral lower extremity edema   • Positive depression screening   • Mononeuropathy, unspecified   • Mixed hyperlipidemia   • Proteinuria   • Vitamin D deficiency   • Type 2 diabetes mellitus, with long-term current use of insulin (HCC)   • Acute respiratory failure with hypoxia (HCC)   • Hematuria   • Aortic thrombus (HCC)   • Prolonged Q-T interval on ECG   • Empyema lung (HCC)   • S/P AKA (above knee amputation), right (HCC)   • Cervical radiculopathy   • Chronic bilateral low back pain   • Above-knee amputation of right lower extremity (HCC)   • Drug-induced constipation   • Moderate protein-calorie malnutrition (HCC)   • Chronic pain syndrome   • Chronic hyponatremia   • Depression   • History of COVID-19   • Pulmonary fibrosis (HCC)   • Foot drop, left   • PAD (peripheral artery disease) (HCC)   • Diabetic neuropathy (HCC)     Past Medical History:   Diagnosis Date   • Arthritis    • Diabetes mellitus ([facility] [address] )    • GERD (gastroesophageal reflux disease)    • Hypercholesteremia    • Hypertension    • Ischemia of right lower extremity with suspected rhabdomyolysis 2/6/2021   • Left Hydropneumothorax 2/10/2021   • Methadone use    • Pneumonia due to COVID-19 virus 1/11/2021   • Subacute osteomyelitis of right femur ([facility] [address] ) 9/13/2021     Past Surgical History:   Procedure Laterality Date   • 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    Procedure: AMPUTATION ABOVE KNEE (AKA);   Surgeon: Cha Pearce MD;  Location: [facility];  Service: Vascular   • AMPUTATION ABOVE KNEE (AKA) Right 1/18/2021    Procedure: AMPUTATION ABOVE KNEE (AKA) FORMALIZATION,  R AKA wound washout, wound closure;  Surgeon: Cha Pearce MD;  Location: [facility];  Service: Vascular   • ARTERIOGRAM Right 1/13/2021    Procedure: ARTERIOGRAM;  Surgeon: Andres Galicia DO;  Location: [facility];  Service: Vascular   • IR CHEST TUBE PLACEMENT  2/10/2021   • IR LOWER EXTREMITY ANGIOGRAM  1/14/2021   • THROMBECTOMY W/ EMBOLECTOMY Right 1/13/2021    Procedure: EMBOLECTOMY/THROMBECTOMY LOWER EXTREMITY;  Surgeon: Kartik Angel DO;  Location: [facility];  Service: Vascular     Social History     Socioeconomic History   • Marital status:      Spouse name: None   • Number of children: None   • Years of education: None   • Highest education level: None   Occupational History   • None   Tobacco Use   • Smoking status: Every Day     Packs/day: 0 50     Types: Cigarettes   • Smokeless tobacco: Never   Vaping Use   • Vaping Use: Never used   Substance and Sexual Activity   • Alcohol use: Not Currently   • Drug use: No     Comment: methadone   • Sexual activity: None   Other Topics Concern   • None   Social History Narrative   • None     Social Determinants of Health     Financial Resource Strain: Not on file   Food Insecurity: Not on file   Transportation Needs: Not on file   Physical Activity: Not on file   Stress: Not on file   Social Connections: Not on file   Intimate Partner Violence: Not on file   Housing Stability: Not on file        Current Outpatient Medications:   •  acetaminophen (TYLENOL) 325 mg tablet, Take 3 tablets (975 mg total) by mouth every 8 (eight) hours (Patient not taking: Reported on 11/16/2022), Disp: , Rfl: 0  •  albuterol (PROVENTIL HFA,VENTOLIN HFA) 90 mcg/act inhaler, Inhale 2 puffs every 4 (four) hours as needed for wheezing (Patient not taking: Reported on 11/16/2022), Disp: , Rfl: 0  •  aspirin 81 mg chewable tablet, Chew 81 mg daily, Disp: , Rfl:   •  atorvastatin (LIPITOR) 20 mg tablet, Take 1 tablet (20 mg total) by mouth daily, Disp: 90 tablet, Rfl: 3  •  BinaxNOW COVID-19 Ag Home Test KIT, , Disp: , Rfl:   •  bisacodyl (DULCOLAX) 5 mg EC tablet, Take as instructed by GI office for bowel prep for colonoscopy (Patient not taking: Reported on 11/16/2022), Disp: 2 tablet, Rfl: 0  •  Blood Glucose Monitoring Suppl (OneTouch Verio) w/Device KIT, Use to test blood sugars 3 times daily, Disp: 1 kit, Rfl: 0  •  cholecalciferol (VITAMIN D3) 1,000 units tablet, Take 2 tablets (2,000 Units total) by mouth daily, Disp: 12 tablet, Rfl: 0  •  Continuous Blood Gluc  (FreeStyle Ke 2 Bath) JOSEPH, Use as directed, Disp: 1 each, Rfl: 0  •  Continuous Blood Gluc Sensor (FreeStyle Ke 2 Sensor) MISC, Change every 14 days, Disp: 6 each, Rfl: 2  •  Diclofenac Sodium (VOLTAREN) 1 %, Apply 4 g topically 4 (four) times a day (Patient taking differently: Apply 4 g topically 4 (four) times a day as needed), Disp: , Rfl: 0  •  DULoxetine (CYMBALTA) 60 mg delayed release capsule, Take 1 capsule (60 mg total) by mouth daily at bedtime, Disp: 30 capsule, Rfl: 2  •  Eliquis 5 MG, Take 1 tablet by mouth twice daily, Disp: 180 tablet, Rfl: 0  •  famotidine (PEPCID) 20 mg tablet, Take 1 tablet (20 mg total) by mouth 2 (two) times a day, Disp: 30 tablet, Rfl: 8  •  fluticasone (FLONASE) 50 mcg/act nasal spray, 1 spray into each nostril daily (Patient not taking: Reported on 11/16/2022), Disp: 16 g, Rfl: 2  •  glucose blood test strip, Use 1 each 3 (three) times daily after meals Use as instructed, Disp: 270 each, Rfl: 3  •  insulin glargine (LANTUS) 100 units/mL subcutaneous injection, Inject 27 Units under the skin daily at bedtime, Disp: 10 mL, Rfl: 3  •  insulin lispro (HumaLOG KwikPen) 100 units/mL injection pen, Blood Glucose 150 - 224: 4 Unit of Insulin Blood Glucose 225 - 299: 5 Units of Insulin Blood Glucose 300 - 374: 6 Units of Insulin Blood Glucose 375 - 449: 7 Units of Insulin Blood Glucose greater than or equal to 450: 8 Units of Insulin Max of 24 units per day, Disp: 15 mL, Rfl: 3  •  Insulin Pen Needle 31G X 5 MM MISC, Use daily Pt to inject 4 X daily, Disp: 100 each, Rfl: 5  •  Insulin Pen Needle 31G X 5 MM MISC, 30 units sq 2X daily, Disp: 100 each, Rfl: 3  •  Insulin Syringe-Needle U-100 (BD Veo Insulin Syringe U/F) 31G X 15/64" 0 5 ML MISC, Inject under the skin 3 (three) times a day Use as directed, Disp: 300 each, Rfl: 5  •  Janumet  MG per tablet, TAKE 1 TABLET BY MOUTH TWICE DAILY WITH MEALS, Disp: 180 tablet, Rfl: 0  •  Jardiance 25 MG TABS, TAKE 1 TABLET BY MOUTH ONCE DAILY IN THE MORNING, Disp: 30 tablet, Rfl: 0  •  levocetirizine (XYZAL) 5 MG tablet, TAKE 1 TABLET BY MOUTH ONCE DAILY IN THE EVENING, Disp: 90 tablet, Rfl: 0  •  lidocaine (LMX) 4 % cream, Apply topically 4 (four) times a day as needed for mild pain, Disp: 30 g, Rfl: 0  •  lisinopril (ZESTRIL) 10 mg tablet, Take 1 tablet by mouth once daily, Disp: 90 tablet, Rfl: 0  •  magnesium oxide (MAG-OX) 400 mg, Take 1 tablet (400 mg total) by mouth 2 (two) times a day, Disp: 60 tablet, Rfl: 0  •  menthol-methyl salicylate (BENGAY) 08-92 % cream, Apply topically 4 (four) times a day as needed (torticollis), Disp: , Rfl: 0  •  methadone (DOLOPHINE) 10 mg tablet, Take 70 mg by mouth daily,, Disp: , Rfl:   •  multivitamin-minerals (CENTRUM ADULTS) tablet, Take 1 tablet by mouth daily, Disp: , Rfl: 0  •  naloxone (NARCAN) 4 mg/0 1 mL nasal spray, Administer 1 spray into a nostril  If no response after 2-3 minutes, give another dose in the other nostril using a new spray   (Patient not taking: Reported on 11/16/2022), Disp: 1 each, Rfl: 1  •  nicotine (NICODERM CQ) 21 mg/24 hr TD 24 hr patch, Place 1 patch on the skin every 24 hours, Disp: 28 patch, Rfl: 0  •  ondansetron (ZOFRAN-ODT) 4 mg disintegrating tablet, DISSOLVE 1 TABLET IN MOUTH EVERY 6 HOURS AS NEEDED FOR NAUSEA FOR VOMITING (Patient not taking: Reported on 11/16/2022), Disp: 40 tablet, Rfl: 0  •  pantoprazole (PROTONIX) 40 mg tablet, Take 1 tablet (40 mg total) by mouth daily, Disp: 30 tablet, Rfl: 3  •  polyethylene glycol (GOLYTELY) 4000 mL solution, Take as instructed by GI office for bowel prep, Disp: 4000 mL, Rfl: 0  •  silver sulfadiazine (SILVADENE,SSD) 1 % cream, Apply topically daily Apply as directed to left heel ulcer daily with each dressing change , Disp: 25 g, Rfl: 2  No current facility-administered medications for this visit  Family History   Problem Relation Age of Onset   • Diabetes Mother    • Hypertension Father    • Leukemia Father    • Acute lymphoblastic leukemia Father    • Cancer Sister       Review of Systems   All other systems reviewed and are negative  Allergies:  Varenicline      Objective:  /80   Pulse 99   Temp (!) 97 2 °F (36 2 °C)   Resp 20     Physical Exam  Vitals reviewed  Feet:      Left foot:      Skin integrity: Ulcer present  Comments: Left hallux proximal interphalangeal joint with exposed bone  The wound is mixture of fibrotic and granular  Clinically the wound appears stable  No purulent drainage noted  Wound 10/25/22 Diabetic Ulcer Toe (Comment  which one) Anterior; Left (Active)   Wound Image   11/10/22 1138   Wound Description Exposed bone;Pink;Yellow;Slough 11/17/22 1106   Renuka-wound Assessment Erythema 11/17/22 1106   Wound Length (cm) 0 9 cm 11/17/22 1106   Wound Width (cm) 1 2 cm 11/17/22 1106   Wound Depth (cm) 0 3 cm 11/17/22 1106   Wound Surface Area (cm^2) 1 08 cm^2 11/17/22 1106   Wound Volume (cm^3) 0 324 cm^3 11/17/22 1106   Calculated Wound Volume (cm^3) 0 32 cm^3 11/17/22 1106   Change in Wound Size % -357 14 11/17/22 1106   Drainage Amount Moderate 11/17/22 1106   Drainage Description Serous 11/17/22 1106   Non-staged Wound Description Full thickness 11/17/22 1106   Treatments Irrigation with NSS 11/17/22 1106   Patient Tolerance Tolerated well 11/17/22 1106                         Procedures             Wound Instructions:  Orders Placed This Encounter   Procedures   • Wound cleansing and dressings     Left great toe     Wash your hands with soap and water  Remove old dressing, discard into plastic bag and place in trash   Cleanse the ulcer with normal saline prior to applying a clean dressing  Do not use tissue or cotton balls  Do not scrub the ulceer  Pat dry using gauze  Shower yes DO NOT GET DRESSING WET  Apply silver alginate to left great toe ulcer   Cover with gauze  Secure with kenya and tape  Change dressing three times per week and as needed for excessive drainage or leakage  Monitor for signs and symptons of infection such as  Redness,  Swelling,  Increased drainage, or malodor go to be emergency room to be evaluated  Elevate lower leg on pillow so no pressure is on your heel  Follow up at wound center after your vascular surgery  Please call us to make an appointment     Standing Status:   Future     Standing Expiration Date:   11/17/2023         Karina Echevarria DPM      Portions of the record may have been created with voice recognition software  Occasional wrong word or "sound a like" substitutions may have occurred due to the inherent limitations of voice recognition software  Read the chart carefully and recognize, using context, where substitutions have occurred

## 2022-11-18 NOTE — TELEPHONE ENCOUNTER
Verified patient's insurance   CONFIRMED - Patient's insurance is [employer]  Is patient requesting a call when authorization has been obtained? Patient did not request a call  Surgery Date: 11-28-22  Primary Surgeon: BRAYDEN // Saintclair Laura (NPI: [id number])  Assisting Surgeon: Not Applicable (N/A)  Facility: [facility] (Tax: [id number] / [id number]: [id number])  Inpatient / Outpatient: Outpatient  Level: 2    Clearance Received: No clearance ordered  Consent Received: Yes, scanned into Epic on 11-18-22  Medication Hold / Last Dose: No Hold on ASA Hold Eliquis 1 day prior last dose 11-26-22  VQI Spreadsheet: Not Applicable (N/A)  IR Notified: Not Applicable (N/A)  Rep  Notified: Not Applicable (N/A)  Equipment Needs: Not Applicable (N/A)  Vas Lab Requested: Not Applicable (N/A)  Patient Contacted: 11-18-22    Diagnosis: I70 299  Procedure/ CPT Code(s): Angiogram // CPT: L4338556, F4424840 and 65022 // Procedure to take place in OR [Auth/ Cert Based]    For varicose vein related procedures:   Last LEVDR: Not Applicable (N/A)  CEAP Classification: Not Applicable (N/A)  VCSS: Not Applicable (N/A)    Post Operative Date/ Time: To Be Determined (TBD)     *Please review medication hold(s), PATs, and check H&P with patient  *  PATIENT WAS MAILED SURGERY/SHOWERING/DISCHARGE/COVID INSTRUCTIONS AFTER REVIEWING WITH THEM VIA PHONE CALL       Spoke to patient to schedule procedure patient will go and have pre op testing done prior to surgery

## 2022-11-19 NOTE — TELEPHONE ENCOUNTER
Authorization requirements reviewed  Please refer to Andrey Sanz / Bria Mireles number [id number] for case updates      No authorization required

## 2022-11-21 ENCOUNTER — HOME CARE VISIT (OUTPATIENT)
Dept: HOME HEALTH SERVICES | Facility: HOME HEALTHCARE | Age: 62
End: 2022-11-21

## 2022-11-21 VITALS
RESPIRATION RATE: 20 BRPM | DIASTOLIC BLOOD PRESSURE: 76 MMHG | HEART RATE: 78 BPM | OXYGEN SATURATION: 98 % | SYSTOLIC BLOOD PRESSURE: 132 MMHG | TEMPERATURE: 98.7 F

## 2022-11-22 ENCOUNTER — OFFICE VISIT (OUTPATIENT)
Dept: GASTROENTEROLOGY | Facility: CLINIC | Age: 62
End: 2022-11-22

## 2022-11-22 ENCOUNTER — OFFICE VISIT (OUTPATIENT)
Dept: LAB | Facility: HOSPITAL | Age: 62
End: 2022-11-22
Attending: SURGERY

## 2022-11-22 ENCOUNTER — TELEPHONE (OUTPATIENT)
Dept: FAMILY MEDICINE CLINIC | Facility: CLINIC | Age: 62
End: 2022-11-22

## 2022-11-22 ENCOUNTER — HOSPITAL ENCOUNTER (OUTPATIENT)
Dept: RADIOLOGY | Facility: HOSPITAL | Age: 62
Discharge: HOME | End: 2022-11-22
Attending: SURGERY

## 2022-11-22 ENCOUNTER — APPOINTMENT (OUTPATIENT)
Dept: LAB | Facility: HOSPITAL | Age: 62
End: 2022-11-22
Attending: SURGERY

## 2022-11-22 VITALS
TEMPERATURE: 98 F | HEART RATE: 100 BPM | DIASTOLIC BLOOD PRESSURE: 68 MMHG | HEIGHT: 68 IN | SYSTOLIC BLOOD PRESSURE: 127 MMHG | WEIGHT: 180 LBS | BODY MASS INDEX: 27.28 KG/M2

## 2022-11-22 DIAGNOSIS — Z12.11 COLON CANCER SCREENING: ICD-10-CM

## 2022-11-22 DIAGNOSIS — I70.299 ATHEROSCLEROSIS OF ARTERY OF EXTREMITY WITH ULCERATION (HCC): ICD-10-CM

## 2022-11-22 DIAGNOSIS — L97.909 ATHEROSCLEROSIS OF ARTERY OF EXTREMITY WITH ULCERATION (HCC): ICD-10-CM

## 2022-11-22 DIAGNOSIS — L97.526 DIABETIC ULCER OF TOE OF LEFT FOOT ASSOCIATED WITH TYPE 2 DIABETES MELLITUS, WITH BONE INVOLVEMENT WITHOUT EVIDENCE OF NECROSIS (HCC): ICD-10-CM

## 2022-11-22 DIAGNOSIS — E11.621 DIABETIC ULCER OF TOE OF LEFT FOOT ASSOCIATED WITH TYPE 2 DIABETES MELLITUS, WITH BONE INVOLVEMENT WITHOUT EVIDENCE OF NECROSIS (HCC): ICD-10-CM

## 2022-11-22 DIAGNOSIS — R11.14 BILIOUS VOMITING WITH NAUSEA: Primary | ICD-10-CM

## 2022-11-22 DIAGNOSIS — R68.81 EARLY SATIETY: ICD-10-CM

## 2022-11-22 DIAGNOSIS — K21.9 GASTROESOPHAGEAL REFLUX DISEASE, UNSPECIFIED WHETHER ESOPHAGITIS PRESENT: ICD-10-CM

## 2022-11-22 LAB
ANION GAP SERPL CALCULATED.3IONS-SCNC: 8 MMOL/L (ref 4–13)
BUN SERPL-MCNC: 17 MG/DL (ref 5–25)
CALCIUM SERPL-MCNC: 9.1 MG/DL (ref 8.3–10.1)
CHLORIDE SERPL-SCNC: 100 MMOL/L (ref 96–108)
CO2 SERPL-SCNC: 28 MMOL/L (ref 21–32)
CREAT SERPL-MCNC: 1.18 MG/DL (ref 0.6–1.3)
ERYTHROCYTE [DISTWIDTH] IN BLOOD BY AUTOMATED COUNT: 14 % (ref 11.6–15.1)
GFR SERPL CREATININE-BSD FRML MDRD: 64 ML/MIN/1.73SQ M
GLUCOSE SERPL-MCNC: 309 MG/DL (ref 65–140)
HCT VFR BLD AUTO: 42.6 % (ref 36.5–49.3)
HGB BLD-MCNC: 14.2 G/DL (ref 12–17)
INR PPP: 1.13 (ref 0.84–1.19)
MCH RBC QN AUTO: 28.9 PG (ref 26.8–34.3)
MCHC RBC AUTO-ENTMCNC: 33.3 G/DL (ref 31.4–37.4)
MCV RBC AUTO: 87 FL (ref 82–98)
PLATELET # BLD AUTO: 208 THOUSANDS/UL (ref 149–390)
PMV BLD AUTO: 11.2 FL (ref 8.9–12.7)
POTASSIUM SERPL-SCNC: 5.2 MMOL/L (ref 3.5–5.3)
PROTHROMBIN TIME: 14.7 SECONDS (ref 11.6–14.5)
RBC # BLD AUTO: 4.92 MILLION/UL (ref 3.88–5.62)
SODIUM SERPL-SCNC: 136 MMOL/L (ref 135–147)
WBC # BLD AUTO: 10.5 THOUSAND/UL (ref 4.31–10.16)

## 2022-11-22 NOTE — PROGRESS NOTES
[facility] - Outpatient Follow-up Note  Charles Fuller 57 y o  male MRN: [id number]  Encounter: [id number]          ASSESSMENT AND PLAN:      1  Bilious vomiting with nausea  2  Early satiety  3  Gastroesophageal reflux disease, unspecified whether esophagitis present    Patient reports that he continues with nausea and occasional vomiting as well as a poor appetite and early satiety  He was to undergo EGD however had to reschedule due to wound infection  Currently takes pantoprazole 40 mg daily and Pepcid 20 mg twice a day and does note some improvement and his vomiting  Discussed rescheduling EGD at this time, he is agreeable  Also discussed the possibility of gastroparesis given his diabetes, he is agreeable to undergo gastric emptying study at this time       - NM gastric emptying; Future  - EGD; Future    4  Colon cancer screening    Patient was to undergo colonoscopy has he has never had screening done in the past however states that with his limitations with amputation and other medical issues, he is unsure if he would tolerate prep at this time  He does not have any family history of colon cancer or rectal bleeding at this time  Discussed Cologuard screening, patient is agreeable  - Cologuard    Will see patient back after procedure     ______________________________________________________________________    SUBJECTIVE:  Sara Zamora is a 20-year-old male that presents today for a follow-up of GERD and constipation  Patient was to undergo EGD and colonoscopy however reschedule due to wound infection  Patient reports that he has a infected wound on his left toe likely related to peripheral artery disease and will be undergoing angiogram next week  Patient reports that he still continues with bouts of nausea with occasional vomiting but states that this is somewhat improved  He is taking PPI daily and Pepcid twice a day    He finds that the nausea happens mostly in the morning  He does also report a poor appetite and early satiety at times  Patient reports that his constipation is somewhat improved with the MiraLax  He was to undergo colonoscopy for screening however states that with his limitations and other medical issues he is unsure that he would tolerate the prep  REVIEW OF SYSTEMS:  Review of Systems   Constitutional: Positive for appetite change  HENT: Negative for trouble swallowing  Gastrointestinal: Positive for nausea and vomiting  Negative for abdominal distention, abdominal pain, anal bleeding, blood in stool, constipation and diarrhea  Historical Information   Past Medical History:   Diagnosis Date   • Arthritis    • Diabetes mellitus ([place] [address] )    • DVT (deep venous thrombosis) ([facility])    • GERD (gastroesophageal reflux disease)    • History of COVID-19 2020    severe disease, intubated/ICU x 1 month   • History of drug abuse ([place] [address] )     heroin abuse, clean since 2003   • Hypercholesteremia    • Hypertension    • Ischemia of right lower extremity with suspected rhabdomyolysis 02/06/2021   • Left Hydropneumothorax 02/10/2021   • Methadone use    • Pneumonia due to COVID-19 virus 01/11/2021   • Subacute osteomyelitis of right femur ([place] [address] ) 09/13/2021   • Vascular disorder of lower extremity      Past Surgical History:   Procedure Laterality Date   • AMPUTATION ABOVE KNEE (AKA) Right 01/14/2021    Procedure: AMPUTATION ABOVE KNEE (AKA);   Surgeon: Myke Mayfield MD;  [facility];  Service: Vascular   • AMPUTATION ABOVE KNEE (AKA) Right 01/18/2021    Procedure: AMPUTATION ABOVE KNEE (AKA) FORMALIZATION,  R AKA wound washout, wound closure;  Surgeon: Myke Mayfield MD;  [facility];  Service: Vascular   • ARTERIOGRAM Right 01/13/2021    Procedure: ARTERIOGRAM;  Surgeon: Tracy Mccain DO;  [facility];  Service: Vascular   • BRONCHOSCOPY     • IR CHEST TUBE PLACEMENT  02/10/2021   • IR LOWER EXTREMITY ANGIOGRAM  01/14/2021 • THROMBECTOMY W/ EMBOLECTOMY Right 2021    Procedure: EMBOLECTOMY/THROMBECTOMY LOWER EXTREMITY;  Surgeon: Isaura Acosta DO;  [facility];  Service: Vascular     Social History   Social History     Substance and Sexual Activity   Alcohol Use Yes    Comment: rare     Social History     Substance and Sexual Activity   Drug Use Not Currently   • Types: Heroin    Comment: clean since , now on methadone     Social History     Tobacco Use   Smoking Status Former   • Packs/day: 0 50   • Types: Cigarettes   • Quit date: 2022   • Years since quittin 0   Smokeless Tobacco Never     Family History   Problem Relation Age of Onset   • Diabetes Mother    • Hypertension Father    • Leukemia Father    • Acute lymphoblastic leukemia Father    • Cancer Sister        Meds/Allergies       Current Outpatient Medications:   •  acetaminophen (TYLENOL) 325 mg tablet  •  albuterol (PROVENTIL HFA,VENTOLIN HFA) 90 mcg/act inhaler  •  aspirin 81 mg chewable tablet  •  atorvastatin (LIPITOR) 20 mg tablet  •  bisacodyl (DULCOLAX) 5 mg EC tablet  •  Blood Glucose Monitoring Suppl (OneTouch Verio) w/Device KIT  •  cholecalciferol (VITAMIN D3) 1,000 units tablet  •  Continuous Blood Gluc  (FreeStyle Ke 2 [place]) JOSEPH  •  Continuous Blood Gluc Sensor (FreeStyle Ke 2 Sensor) MISC  •  Diclofenac Sodium (VOLTAREN) 1 %  •  DULoxetine (CYMBALTA) 60 mg delayed release capsule  •  Eliquis 5 MG  •  famotidine (PEPCID) 20 mg tablet  •  fluticasone (FLONASE) 50 mcg/act nasal spray  •  glucose blood test strip  •  insulin glargine (LANTUS) 100 units/mL subcutaneous injection  •  insulin lispro (HumaLOG KwikPen) 100 units/mL injection pen  •  Insulin Pen Needle 31G X 5 MM MISC  •  Insulin Pen Needle 31G X 5 MM MISC  •  Insulin Syringe-Needle U-100 (BD Veo Insulin Syringe U/F) 31G X 15/64" 0 5 ML MISC  •  Janumet  MG per tablet  •  Jardiance 25 MG TABS  •  levocetirizine (XYZAL) 5 MG tablet  •  lidocaine (LMX) 4 % cream  •  lisinopril (ZESTRIL) 10 mg tablet  •  magnesium oxide (MAG-OX) 400 mg  •  menthol-methyl salicylate (BENGAY) 34-89 % cream  •  methadone (DOLOPHINE) 10 mg tablet  •  multivitamin-minerals (CENTRUM ADULTS) tablet  •  nicotine (NICODERM CQ) 21 mg/24 hr TD 24 hr patch  •  ondansetron (ZOFRAN-ODT) 4 mg disintegrating tablet  •  pantoprazole (PROTONIX) 40 mg tablet  •  polyethylene glycol (GOLYTELY) 4000 mL solution  •  silver sulfadiazine (SILVADENE,SSD) 1 % cream  •  BinaxNOW COVID-19 Ag Home Test KIT  •  naloxone (NARCAN) 4 mg/0 1 mL nasal spray    Allergies   Allergen Reactions   • Varenicline Rash           Objective     Blood pressure 127/68, pulse 100, temperature 98 °F (36 7 °C), height 5' 8" (1 727 m), weight 81 6 kg (180 lb)  Body mass index is 27 37 kg/m²  PHYSICAL EXAM:      General Appearance:   Alert, cooperative, no distress, right amputation, presents in a wheelchair   HEENT:   Normocephalic, atraumatic, anicteric  Neck:  Supple, symmetrical, trachea midline   Lungs:   Clear to auscultation bilaterally; no rales, rhonchi or wheezing; respirations unlabored    Heart[de-identified]   Regular rate and rhythm; no murmur, rub, or gallop     Abdomen:   Soft, non-tender, non-distended; normal bowel sounds; no masses, no organomegaly    Genitalia:   Deferred    Rectal:   Deferred    Extremities:  No cyanosis, clubbing or edema    Skin:  No jaundice, rashes, or lesions             Lab Results:   Office Visit on 11/22/2022   Component Date Value   • Ventricular Rate 11/22/2022 96    • Atrial Rate 11/22/2022 96    • WA Interval 11/22/2022 136    • QRSD Interval 11/22/2022 74    • QT Interval 11/22/2022 352    • QTC Interval 11/22/2022 444    • P Axis 11/22/2022 44    • QRS Axis 11/22/2022 52    • T Wave Axis 11/22/2022 62    Appointment on 11/22/2022   Component Date Value   • Sodium 11/22/2022 136    • Potassium 11/22/2022 5 2    • Chloride 11/22/2022 100    • CO2 11/22/2022 28    • ANION GAP 11/22/2022 8    • BUN 11/22/2022 17    • Creatinine 11/22/2022 1 18    • Glucose 11/22/2022 309 (H)    • Calcium 11/22/2022 9 1    • eGFR 11/22/2022 64    • WBC 11/22/2022 10 50 (H)    • RBC 11/22/2022 4 92    • Hemoglobin 11/22/2022 14 2    • Hematocrit 11/22/2022 42 6    • MCV 11/22/2022 87    • MCH 11/22/2022 28 9    • MCHC 11/22/2022 33 3    • RDW 11/22/2022 14 0    • Platelets 91/64/1979 208    • MPV 11/22/2022 11 2    • Protime 11/22/2022 14 7 (H)    • INR 11/22/2022 1 13          Radiology Results:   MRI foot/forefoot toes left wo contrast    Result Date: 11/8/2022  Narrative: MRI LEFT FOOT INDICATION:   E11 621: Type 2 diabetes mellitus with foot ulcer L97 526: Non-pressure chronic ulcer of other part of left foot with bone involvement without evidence of necrosis E11 49: Type 2 diabetes mellitus with other diabetic neurological complication  COMPARISON: Plain films same 10/10/2022  TECHNIQUE:  Multiplanar/multisequence MR of the left foot was performed  FINDINGS: Image quality degraded by patient motion  SUBCUTANEOUS TISSUES: Dorsal skin ulceration at the 1st interphalangeal joint with subcutaneous edema indicating cellulitis  Additionally, there is mild ill-defined T1 replacement signal at the distal aspect of the 1st proximal phalanx best seen on series 8 image 20 with corresponding T2 signal changes worrisome for osteomyelitis  There is also minimal T1 replacement signal at the base of the 1st proximal 2nd phalanx on series 8 image 19, again with corresponding T2 signal changes  Small intervening joint effusion raises suspicion for septic arthropathy  BONES:  As above  FIRST MTP JOINT:  Mild degenerative change with mild hallux valgus deformity noted  SESAMOID BONES:  Intact  OTHER ARTICULAR SURFACE:  Normal  PLANTAR FASCIA:  Intact  LISFRANC LIGAMENT:  Intact  FOREFOOT TENDONS: Intact  INTERMETATARSAL REGIONS: No bursitis or Canales's neuroma   MUSCULATURE: Fatty replacement of forefoot musculature consistent with diabetic history  Impression: Dorsal skin ulceration at the 1st interphalangeal joint with mild ill-defined T1 marrow replacement signal across the 1st interphalangeal joint with tiny joint effusion all concerning for osteomyelitis and septic arthropathy  The study was marked in [facility] for immediate notification   Workstation performed: [id number]

## 2022-11-22 NOTE — PATIENT INSTRUCTIONS
Scheduled date of EGD(as of today):1/11/2023  Physician performing EGD: Dr Clancy Situ  [place] [address] [facility] [place]  Instructions reviewed with patient by: Negrita Lopez  Clearances:  Eliquis/Diabetic    Called and scheduled NM Gastric emptying, gave instructions on 1/31/2023 @ 8:30 am

## 2022-11-23 ENCOUNTER — TELEPHONE (OUTPATIENT)
Dept: GASTROENTEROLOGY | Facility: CLINIC | Age: 62
End: 2022-11-23

## 2022-11-23 ENCOUNTER — HOME CARE VISIT (OUTPATIENT)
Dept: HOME HEALTH SERVICES | Facility: HOME HEALTHCARE | Age: 62
End: 2022-11-23

## 2022-11-23 VITALS
HEART RATE: 99 BPM | OXYGEN SATURATION: 99 % | RESPIRATION RATE: 18 BRPM | SYSTOLIC BLOOD PRESSURE: 122 MMHG | TEMPERATURE: 97 F | DIASTOLIC BLOOD PRESSURE: 80 MMHG

## 2022-11-23 LAB
ATRIAL RATE: 96 BPM
P AXIS: 44 DEGREES
PR INTERVAL: 136 MS
QRS AXIS: 52 DEGREES
QRSD INTERVAL: 74 MS
QT INTERVAL: 352 MS
QTC INTERVAL: 444 MS
T WAVE AXIS: 62 DEGREES
VENTRICULAR RATE: 96 BPM

## 2022-11-23 NOTE — TELEPHONE ENCOUNTER
I called patient to let him know that I called and scheduled NM Gastric emptying, I talked to his Nephew and  gave instructions on 1/31/2023 @ 8:30 am  He expressed understanding

## 2022-11-25 ENCOUNTER — HOME CARE VISIT (OUTPATIENT)
Dept: HOME HEALTH SERVICES | Facility: HOME HEALTHCARE | Age: 62
End: 2022-11-25

## 2022-11-25 ENCOUNTER — ANESTHESIA EVENT (OUTPATIENT)
Dept: PERIOP | Facility: HOSPITAL | Age: 62
End: 2022-11-25

## 2022-11-28 ENCOUNTER — HOSPITAL ENCOUNTER (OUTPATIENT)
Facility: HOSPITAL | Age: 62
Setting detail: OUTPATIENT SURGERY
Discharge: HOME | End: 2022-11-28
Attending: SURGERY | Admitting: SURGERY

## 2022-11-28 ENCOUNTER — ANESTHESIA (OUTPATIENT)
Dept: PERIOP | Facility: HOSPITAL | Age: 62
End: 2022-11-28

## 2022-11-28 ENCOUNTER — APPOINTMENT (OUTPATIENT)
Dept: RADIOLOGY | Facility: HOSPITAL | Age: 62
End: 2022-11-28

## 2022-11-28 VITALS
WEIGHT: 180 LBS | OXYGEN SATURATION: 98 % | SYSTOLIC BLOOD PRESSURE: 118 MMHG | TEMPERATURE: 97.2 F | HEART RATE: 80 BPM | DIASTOLIC BLOOD PRESSURE: 77 MMHG | BODY MASS INDEX: 27.28 KG/M2 | HEIGHT: 68 IN | RESPIRATION RATE: 18 BRPM

## 2022-11-28 DIAGNOSIS — S78.111A ABOVE-KNEE AMPUTATION OF RIGHT LOWER EXTREMITY (HCC): ICD-10-CM

## 2022-11-28 DIAGNOSIS — I74.10 AORTIC THROMBUS (HCC): ICD-10-CM

## 2022-11-28 DIAGNOSIS — E13.621 TOE ULCER DUE TO SECONDARY DM (HCC): ICD-10-CM

## 2022-11-28 DIAGNOSIS — L97.509 TOE ULCER DUE TO SECONDARY DM (HCC): ICD-10-CM

## 2022-11-28 LAB
GLUCOSE SERPL-MCNC: 156 MG/DL (ref 65–140)
GLUCOSE SERPL-MCNC: 97 MG/DL (ref 65–140)

## 2022-11-28 DEVICE — MYNX CONTROL VCD 6F 7F
Type: IMPLANTABLE DEVICE | Site: ARTERIAL | Status: FUNCTIONAL
Brand: MYNX CONTROL

## 2022-11-28 DEVICE — DRUG-ELUTING VASCULAR STENT SYSTEM
Type: IMPLANTABLE DEVICE | Site: ARTERIAL | Status: FUNCTIONAL
Brand: ELUVIA™

## 2022-11-28 RX ORDER — ONDANSETRON 2 MG/ML
INJECTION INTRAMUSCULAR; INTRAVENOUS AS NEEDED
Status: DISCONTINUED | OUTPATIENT
Start: 2022-11-28 | End: 2022-11-28

## 2022-11-28 RX ORDER — FENTANYL CITRATE/PF 50 MCG/ML
25 SYRINGE (ML) INJECTION
Status: DISCONTINUED | OUTPATIENT
Start: 2022-11-28 | End: 2022-11-28 | Stop reason: HOSPADM

## 2022-11-28 RX ORDER — EPHEDRINE SULFATE 50 MG/ML
INJECTION INTRAVENOUS AS NEEDED
Status: DISCONTINUED | OUTPATIENT
Start: 2022-11-28 | End: 2022-11-28

## 2022-11-28 RX ORDER — ONDANSETRON 2 MG/ML
4 INJECTION INTRAMUSCULAR; INTRAVENOUS ONCE AS NEEDED
Status: DISCONTINUED | OUTPATIENT
Start: 2022-11-28 | End: 2022-11-28 | Stop reason: HOSPADM

## 2022-11-28 RX ORDER — CLOPIDOGREL BISULFATE 75 MG/1
150 TABLET ORAL ONCE
Status: COMPLETED | OUTPATIENT
Start: 2022-11-28 | End: 2022-11-28

## 2022-11-28 RX ORDER — IODIXANOL 320 MG/ML
INJECTION, SOLUTION INTRAVASCULAR AS NEEDED
Status: DISCONTINUED | OUTPATIENT
Start: 2022-11-28 | End: 2022-11-28 | Stop reason: HOSPADM

## 2022-11-28 RX ORDER — PROMETHAZINE HYDROCHLORIDE 25 MG/ML
12.5 INJECTION, SOLUTION INTRAMUSCULAR; INTRAVENOUS ONCE AS NEEDED
Status: DISCONTINUED | OUTPATIENT
Start: 2022-11-28 | End: 2022-11-28 | Stop reason: HOSPADM

## 2022-11-28 RX ORDER — HEPARIN SODIUM 200 [USP'U]/100ML
INJECTION, SOLUTION INTRAVENOUS
Status: COMPLETED | OUTPATIENT
Start: 2022-11-28 | End: 2022-11-28

## 2022-11-28 RX ORDER — CHLORHEXIDINE GLUCONATE 0.12 MG/ML
15 RINSE ORAL ONCE
Status: COMPLETED | OUTPATIENT
Start: 2022-11-28 | End: 2022-11-28

## 2022-11-28 RX ORDER — ALBUTEROL SULFATE 2.5 MG/3ML
2.5 SOLUTION RESPIRATORY (INHALATION) ONCE AS NEEDED
Status: DISCONTINUED | OUTPATIENT
Start: 2022-11-28 | End: 2022-11-28 | Stop reason: HOSPADM

## 2022-11-28 RX ORDER — HYDROMORPHONE HCL/PF 1 MG/ML
0.5 SYRINGE (ML) INJECTION
Status: DISCONTINUED | OUTPATIENT
Start: 2022-11-28 | End: 2022-11-28 | Stop reason: HOSPADM

## 2022-11-28 RX ORDER — HEPARIN SODIUM 1000 [USP'U]/ML
INJECTION, SOLUTION INTRAVENOUS; SUBCUTANEOUS AS NEEDED
Status: DISCONTINUED | OUTPATIENT
Start: 2022-11-28 | End: 2022-11-28

## 2022-11-28 RX ORDER — LIDOCAINE HYDROCHLORIDE 10 MG/ML
INJECTION, SOLUTION EPIDURAL; INFILTRATION; INTRACAUDAL; PERINEURAL AS NEEDED
Status: DISCONTINUED | OUTPATIENT
Start: 2022-11-28 | End: 2022-11-28

## 2022-11-28 RX ORDER — ROCURONIUM BROMIDE 10 MG/ML
INJECTION, SOLUTION INTRAVENOUS AS NEEDED
Status: DISCONTINUED | OUTPATIENT
Start: 2022-11-28 | End: 2022-11-28

## 2022-11-28 RX ORDER — FENTANYL CITRATE 50 UG/ML
INJECTION, SOLUTION INTRAMUSCULAR; INTRAVENOUS AS NEEDED
Status: DISCONTINUED | OUTPATIENT
Start: 2022-11-28 | End: 2022-11-28

## 2022-11-28 RX ORDER — PROPOFOL 10 MG/ML
INJECTION, EMULSION INTRAVENOUS AS NEEDED
Status: DISCONTINUED | OUTPATIENT
Start: 2022-11-28 | End: 2022-11-28

## 2022-11-28 RX ORDER — SODIUM CHLORIDE 9 MG/ML
125 INJECTION, SOLUTION INTRAVENOUS CONTINUOUS
Status: DISCONTINUED | OUTPATIENT
Start: 2022-11-28 | End: 2022-11-28 | Stop reason: HOSPADM

## 2022-11-28 RX ADMIN — SODIUM CHLORIDE: 0.9 INJECTION, SOLUTION INTRAVENOUS at 13:17

## 2022-11-28 RX ADMIN — HEPARIN SODIUM 7000 UNITS: 1000 INJECTION INTRAVENOUS; SUBCUTANEOUS at 12:09

## 2022-11-28 RX ADMIN — PHENYLEPHRINE HYDROCHLORIDE 30 MCG/MIN: 10 INJECTION INTRAVENOUS at 12:18

## 2022-11-28 RX ADMIN — EPHEDRINE SULFATE 5 MG: 50 INJECTION, SOLUTION INTRAVENOUS at 12:12

## 2022-11-28 RX ADMIN — SODIUM CHLORIDE 125 ML/HR: 0.9 INJECTION, SOLUTION INTRAVENOUS at 09:52

## 2022-11-28 RX ADMIN — PROPOFOL 170 MG: 10 INJECTION, EMULSION INTRAVENOUS at 11:33

## 2022-11-28 RX ADMIN — ONDANSETRON 4 MG: 2 INJECTION INTRAMUSCULAR; INTRAVENOUS at 13:14

## 2022-11-28 RX ADMIN — ROCURONIUM BROMIDE 50 MG: 10 INJECTION, SOLUTION INTRAVENOUS at 11:34

## 2022-11-28 RX ADMIN — FENTANYL CITRATE 75 MCG: 50 INJECTION INTRAMUSCULAR; INTRAVENOUS at 11:33

## 2022-11-28 RX ADMIN — CLOPIDOGREL BISULFATE 150 MG: 75 TABLET ORAL at 15:32

## 2022-11-28 RX ADMIN — CHLORHEXIDINE GLUCONATE 0.12% ORAL RINSE 15 ML: 1.2 LIQUID ORAL at 09:52

## 2022-11-28 RX ADMIN — SUGAMMADEX 200 MG: 100 INJECTION, SOLUTION INTRAVENOUS at 13:12

## 2022-11-28 RX ADMIN — LIDOCAINE HYDROCHLORIDE 100 MG: 10 INJECTION, SOLUTION EPIDURAL; INFILTRATION; INTRACAUDAL; PERINEURAL at 11:33

## 2022-11-28 NOTE — OP NOTE
OPERATIVE REPORT  PATIENT NAME: Valerie Thao    :  1960  MRN: [id number]  Pt Location: [place] [facility]    SURGERY DATE: 2022    Surgeon(s) and Role:     * Richelle Torres MD - Primary     * Manuel Akhtar MD - Assisting    Preop Diagnosis:  Diabetic ulcer of toe of left foot associated with type 2 diabetes mellitus, with bone involvement without evidence of necrosis ([address] ) [Q57 018, L97 526]  Atherosclerosis of artery of extremity with ulceration ([address] ) [I70 299, F29 814]    Post-Op Diagnosis Codes:     * Diabetic ulcer of toe of left foot associated with type 2 diabetes mellitus, with bone involvement without evidence of necrosis ([address] ) [R59 554, L97 526]     * Atherosclerosis of artery of extremity with ulceration ([address] ) [I70 299, H82 833]    Procedure(s) (LRB):  US guided access Right femoral artery puncture  Aortogram with pelvic runoff  Right lower extremity angiogram  left lower extremity angiogram  Balloon angioplasty and stenting of the left superficial femoral artery    Specimen(s):  * No specimens in log *    Estimated Blood Loss:   Minimal    Drains:  * No LDAs found *    Anesthesia Type:   Choice    Operative Indications:  Diabetic ulcer of toe of left foot associated with type 2 diabetes mellitus, with bone involvement without evidence of necrosis ([address] ) [J61 306, L97 526]  Atherosclerosis of artery of extremity with ulceration ([address] ) [I70 299, L97 909]  Extensive past vascular history, extensive thromboembolic events after QHROG-36 infection resulting in acute right lower extremity ischemia and eventual right above knee amputation  Patient has longstanding history of diabetes and smoking  Worsening right lower extremity peripheral arterial disease resulting in ischemic wound on the right greater toe    Arterial Doppler showed stenosis/occlusion of the SFA and patient was advised procedure for wound healing optimization    Operative Findings:  Angiographic findings and radiographic interpretation of the test-  Distal aorta is patent  Bilateral common, internal and external iliac arteries are patent and overall small caliber  Right lower extremity findings-  Common femoral arteries patent, profunda femoris arteries patent superficial femoral artery is chronically occluded  Patient has above knee amputation hence further imaging not possible  Left lower extremity findings-  Common femoral artery is patent  Profunda femoris artery is patent  Superficial femoral arteries patent in its proximal portion following which it is occluded and then reconstitutes for a short segment and then reoccludes again  Above knee popliteal artery then reconstitutes via collaterals of profunda femoris artery but it is diseased with moderate plaque causing 50% stenosis  Remainder of the popliteal arteries patent  There is a high takeoff of the anterior tibial artery at the above knee level  Anterior tibial artery is a dominant runoff into the foot  The tibioperoneal trunk is nearly occluded  The peroneal arteries occluded  The posterior tibial artery is atretic proximally and does not reconstitute distally  Post balloon angioplasty and stenting there is excellent resolution of the SFA and above knee popliteal segment occlusion/stenosis with preserved runoff via anterior tibial artery  There is now a palpable 2+ distal anterior tibial pulse    Total fluoroscopy time is 20 7 minutes, DAP-45 15 Gy cm2  Total contrast -58  Ml    Complications:   None    Procedure and Technique:  Patient was brought to the hybrid room and placed in a supine position  General anesthesia was induced via endotracheal intubation  Patient's bilateral groins were shaved and prepped and draped using ChloraPrep in a standard sterile fashion  Using ultrasound guidance the right common femoral artery was punctured  It was patent  There was scarring from prior femoral cutdown for thromboembolectomy done 2 years ago    Hence stiff micropuncture system was used  Image of the ultrasound picture of the common femoral artery was obtained and documented in permanent records  It was patent with no significant plaque  Amplatz wire was then advanced into the aorta  Seven Western Nella dilator was used to dilate the access and then a 5 Western Nella sheath was placed  Omniflush catheter was advanced into the distal aorta  Aortogram with bilateral pelvic runoff was performed, findings of which are as dictated above  Using Bentson wire and Omni Flush we went up and over and selected the distal left external iliac artery  Amplatz wire was then advanced up and over and 6 [place] X 45 cm sheath was placed  Left lower extremity angiogram was performed, findings of which are as dictated above  Patient was given 6000 units of intravenous heparin  Using a combination of stiff 035 glidewire and 018 glidewire Advantage and angled glide catheter we were able to cross the occlusions in the superficial femoral artery and the popliteal artery and enter into the true lumen at the level of the above knee popliteal artery  Initial balloon angioplasty was performed using 4 mm x 220 mm barbara balloon at 10 atmospheres for 2 minute inflation time  Following which repeat balloon angioplasty was performed with 5 mm x 220 mm barbara balloon at 6 atmospheres for 2 minute inflation time  There were significant dissections at the level of the treated segment  Hence it was decided to treat the entire segment with stenting  Two overlapping 6 x150 mm and 6 x 120 mm KEO drug coated stents were used to treat the entire segment  The stents were post dilated using 5 mm balloon  Completion arteriogram demonstrated excellent resolution of the SFA and above knee popliteal segment occlusion/stenosis with preserved runoff via anterior tibial artery     right lower extremity arteriogram was performed, findings of which are as dictated above  The sheath was removed and then access was closed using Mynx device successfully  I was present for the entire procedure    Patient Disposition:  PACU         SIGNATURE: Burgess Renetta MD  DATE: November 28, 2022  TIME: 1:17 PM            Vascular Quality Initiative - Peripheral Vascular Intervention     Urgency: Urgent    Functional Status:  Capable of only limited self-care, confined to bed or chair 50% or more of waking hours  Ambulation: Wheelchair = predominate means of motion, except very short distance    Leg Symptoms    Right:Right above knee amp  Left: Ulcer/necrosis (gangrene): de yancy tissue loss due to peripheral arterial disease, not due to non-healing prior amputation       Tissue Loss Severity: Grade 1, Shallow = small shallow ulcer(s) on distal leg or foot, any exposed bone is only limited to distal phalanx (ie, minor tissue loss: limb salvage possible with simple digital amputation [1 or 2 digits], or skin coverage)  Infection: Grade 0, None = No symptoms or signs of infection  COVID Information  COVID Symptoms Pre-Procedure: Asymptomatic    Treatment Delayed by Pandemic: None    Access   Number of Sites: 1     Access Site 1:     Side 1: Right    Site 1: Femoral Retrograde    Access Guidance 1:U/S    Largest Sheath Size 1: 6 Fr      Closure Device 1: MynxGrip      Number of Closure Devices: 1     Closure Device Outcome: Closure device successful         Procedure  Fluoro Time: 20 7 minutes  Contrast Volume: Visipaque 58 ml  DAP: 45 15 Gy cm2  CO2: no  Anticoagulant: Heparin  Protamine: No  If Creatinine is > 1 2 or missing, SHANEKA Prophylaxis none     Treatment Details  Indication: Occlusive Disease,    Completion Assessment  Artery 1 treated: SFA+Pop   Left               Outflow: AT,PT,Peroneal: 1                       Segments treated: P1                      Was this Site previously treated?: No          TASC Grade: C          Total Treated Length: 220 cm          Total Occluded Length: 150 cm Calcification: None (no calcification visible on fluoroscopic, CT or IVUS imaging)          Number of Treatment types (Devices):    2           Device 1          Treatment Type: Plain Balloon         Device 2          Treatment Type: Stent,  Drug Eluting Stent                Diameter: 6 mm          Length: 150 and 120mm                Concomitant: None          Technical result: Successful (stenosis <=30%)      None     Post Procedure  Patient currently taking: Statin, Yes      Antiplatelet Medication, Yes    Procedure Complications: No

## 2022-11-28 NOTE — ANESTHESIA PREPROCEDURE EVALUATION
Procedure:  LEG AGRAM W/ INTERVENTION (Left: Abdomen)    Relevant Problems   CARDIO       (+) Essential hypertension   (+) Hyperlipidemia associated with type 2 diabetes mellitus (HCC)   (+) Mixed hyperlipidemia      ENDO   (+) Type 2 diabetes mellitus with diabetic polyneuropathy (HCC)   (+) Type 2 diabetes mellitus, with long-term current use of insulin (HCC)      GI/HEPATIC   (+) Chronic hepatitis C without hepatic coma (HCC)                                                   (+) on methadone    (+) hx of vomiting in the AM     1/12/21 ECHO  LEFT VENTRICLE: Size was normal  Systolic function was vigorous  Ejection fraction was estimated to be 70 %  There were no regional wall motion abnormalities      RIGHT VENTRICLE: The size was normal  Systolic function was hyperdynamic      MITRAL VALVE: DOPPLER: The transmitral velocity was within the normal range  There was trace regurgitation      TRICUSPID VALVE: DOPPLER: The transtricuspid velocity was within the normal range  There was trace regurgitation      SYSTEMIC VEINS: IVC: The inferior vena cava was normal in size  Respirophasic changes were normal       Physical Exam    Airway    Mallampati score: II  TM Distance: >3 FB  Neck ROM: full     Dental       Cardiovascular  Rhythm: regular, Rate: normal,     Pulmonary  Breath sounds clear to auscultation,     Other Findings        Anesthesia Plan  ASA Score- 2     Anesthesia Type- general with ASA Monitors  Additional Monitors:   Airway Plan: ETT  Plan Factors-    Chart reviewed  Existing labs reviewed  Patient is not a current smoker  Induction- intravenous  Postoperative Plan- Plan for postoperative opioid use  Planned trial extubation    Informed Consent- Anesthetic plan and risks discussed with patient

## 2022-11-28 NOTE — INTERVAL H&P NOTE
H&P reviewed  After examining the patient I find no changes in the patients condition since the H&P had been written      Vitals:    11/28/22 0937   BP: 152/66   Pulse: 91   Resp: 16   Temp: 97 8 °F (36 6 °C)   SpO2: 96%

## 2022-11-28 NOTE — ANESTHESIA POSTPROCEDURE EVALUATION
Post-Op Assessment Note    CV Status:  Stable    Pain management: adequate     Mental Status:  Alert and awake   Hydration Status:  Euvolemic   PONV Controlled:  Controlled   Airway Patency:  Patent      Post Op Vitals Reviewed: Yes      Staff: Anesthesiologist         No notable events documented      BP      Temp      Pulse     Resp      SpO2      /65   Pulse 81   Temp 97 7 °F (36 5 °C) (Temporal)   Resp 14   Ht 5' 8" (1 727 m)   Wt 81 6 kg (180 lb)   SpO2 99%   BMI 27 37 kg/m²

## 2022-11-28 NOTE — DISCHARGE INSTRUCTIONS
DISCHARGE INSTRUCTIONS  ARTERIOGRAM/ANGIOPLASTY/STENT    ACTIVITY: On the evening following the procedure, you should be mostly resting  Someone should remain with you during the evening and overnight following the procedure  On the day after your procedure, limit your activity to walking  Avoid heavy lifting (no more than 15 lbs) for the first three days  Walking up steps and normal activities may be resumed as you feel ready  You should not drive a car for at least two days following discharge from the hospital  You may ride in a car  If you have any questions regarding a particular activity, please discuss with your doctor or nurse before you are discharged  DIET:  Resume your normal diet  Drink more water than usual for the next 24 hours  PROCEDURE SITE: You may have a procedure site in your groin, arm, or foot  You may have surgical glue at your procedure site  The glue is used to cover the procedure site, assist in closure, and prevent contamination  This adhesive will darken and peel away on its own within one to two weeks  Do not pick at it  You should shower daily  Wash incision daily with soap and water, but do not rub or scrub the incision; rinse thoroughly and pat dry  Do not bathe in a tub or swim for the first 2 week following your procedure or if you have any open wounds  It is normal to have some bruising, swelling or discoloration around the procedure site  IF increasing redness, pain, or a bulge develops, call our office immediately  If present, you may remove the band-aid or “steri-strips” over your procedure site after two days  If you notice any active bleeding at the site, apply pressure to the site and call our office ([phone]) or [phone]  FOLLOW UP STUDIES:  Your doctor will discuss whether further treatments or follow-up studies are necessary at your first post procedure visit      FOLLOW UP APPOINTMENTS:  Making and keeping follow up appointments and ultrasound tests are important to your recovery  If you have difficulty making it to or keeping your follow up appointments, call the office  MEDICATIONS:  restart eliquis from night of 11-28-22    If you have increased pain, fever >101 5, increased drainage, redness or a bad smell at your surgery site, new coldness/numbness of your arm or leg, please call us immediately and GO directly to the ER  PLEASE CALL THE OFFICE IF YOU HAVE ANY QUESTIONS  [phone]  [phone] [address] [facility] , [address], [place], [address]  [address], [address], [facility], [address] [facility]  [address], [facility], [address] [facility]  [address] [facility], [address] [facility], [facility], [address]  [address], [address]st [address], Keshia Anderson 34  [facility] 19, [address] [address], [address] [facility], [address] [facility]  [address] [facility], [address] [facility], [address] [facility]  [address] [facility], [address] [facility], [address] [facility],[address], Jacques Bejarano 6  201 [facility], [address]  [address] [place], [place] [address]

## 2022-11-30 ENCOUNTER — HOME CARE VISIT (OUTPATIENT)
Dept: HOME HEALTH SERVICES | Facility: HOME HEALTHCARE | Age: 62
End: 2022-11-30

## 2022-11-30 ENCOUNTER — TELEPHONE (OUTPATIENT)
Dept: GASTROENTEROLOGY | Facility: CLINIC | Age: 62
End: 2022-11-30

## 2022-11-30 DIAGNOSIS — K21.9 GASTROESOPHAGEAL REFLUX DISEASE, UNSPECIFIED WHETHER ESOPHAGITIS PRESENT: ICD-10-CM

## 2022-11-30 RX ORDER — PANTOPRAZOLE SODIUM 40 MG/1
TABLET, DELAYED RELEASE ORAL
Qty: 30 TABLET | Refills: 0 | Status: SHIPPED | OUTPATIENT
Start: 2022-11-30

## 2022-11-30 NOTE — TELEPHONE ENCOUNTER
Called and talked to his nephew Alisha Morton I let him know that I received the medication clearance from Dr Radha Mondragon,  and let him know that his Yifan Eaton should hold his eliquis 2 days prior to his EGD on 1/11/2023  He expressed understanding

## 2022-12-01 ENCOUNTER — TELEPHONE (OUTPATIENT)
Dept: FAMILY MEDICINE CLINIC | Facility: CLINIC | Age: 62
End: 2022-12-01

## 2022-12-01 ENCOUNTER — TELEPHONE (OUTPATIENT)
Dept: VASCULAR SURGERY | Facility: CLINIC | Age: 62
End: 2022-12-01

## 2022-12-01 ENCOUNTER — HOME CARE VISIT (OUTPATIENT)
Dept: HOME HEALTH SERVICES | Facility: HOME HEALTHCARE | Age: 62
End: 2022-12-01

## 2022-12-01 VITALS
DIASTOLIC BLOOD PRESSURE: 70 MMHG | HEART RATE: 90 BPM | TEMPERATURE: 97.1 F | SYSTOLIC BLOOD PRESSURE: 108 MMHG | RESPIRATION RATE: 18 BRPM | OXYGEN SATURATION: 95 %

## 2022-12-01 NOTE — TELEPHONE ENCOUNTER
Pt s/p aortogram w/ pelvic runoff, BLE agram, balloon angioplasty and stenting of L SFA 11/28 by Dr Max Vega  Pt's nephew called the office today to report that pt had blood in his urine last evening when he was straining to have a bowel movement w/ small amounts of blood leaking intermittently from his urethra after that  He states that it did stop after some time, but this morning when his nephew was emptying his urinal he noticed a small clot in the urine  Pt denies any pain w/ urination  Pt's nephew was questioning if pt had a rogers inserted during his procedure- could not find any documentation stating that pt had a rogers placed  Pt's nephew also reported that since the agram he had intermittent "flu like symptoms"  He denies fever, but does state that he had chills intermittently and was feeling "achy"  He states that symptoms would only last a couple hours and then he would feel fine  Pt states he is feeling good today  Advised pt's nephew that he should contact PCP in regards to symptoms but informed him we would also send a message to our triage provider and see if there are any further recommendations  In regards to agram- pt's nephew states that his L leg is good and pt feels like he is getting good blood flow to his foot now  Pt's nephew states his toe ulcer is looking better as well

## 2022-12-01 NOTE — TELEPHONE ENCOUNTER
Charted reviewed  Patient taking ASA and Eliquis (not managed by vascular)    No further recommendations or concerns from a vascular standpoint  Agree patient can be seen and evaluated by PCP for all symptoms he reported and continued blood in urine

## 2022-12-01 NOTE — TELEPHONE ENCOUNTER
Pt nephew was notified and stated that yes they could not tell him if he had a rogers placed or anything but he assumed they did since he was under for about 2 hours  Pt is not experiencing any signs of infection currently and I did let them know to the him to the ED if this continues and gets worse

## 2022-12-01 NOTE — TELEPHONE ENCOUNTER
Pt nephew called stating that he is having blood coming out of his urine as well as some blood blots  He would like to know what he can do   Does he have to go to the ER?

## 2022-12-01 NOTE — TELEPHONE ENCOUNTER
Called pt's nephew, Silvestre Pack, and made him aware  Silvestre Pack verbalized understanding and will contact pt's PCP

## 2022-12-06 ENCOUNTER — OFFICE VISIT (OUTPATIENT)
Dept: FAMILY MEDICINE CLINIC | Facility: CLINIC | Age: 62
End: 2022-12-06

## 2022-12-06 ENCOUNTER — HOME CARE VISIT (OUTPATIENT)
Dept: HOME HEALTH SERVICES | Facility: HOME HEALTHCARE | Age: 62
End: 2022-12-06

## 2022-12-06 VITALS
TEMPERATURE: 97.8 F | HEART RATE: 96 BPM | OXYGEN SATURATION: 97 % | HEIGHT: 68 IN | BODY MASS INDEX: 27.37 KG/M2 | DIASTOLIC BLOOD PRESSURE: 74 MMHG | SYSTOLIC BLOOD PRESSURE: 132 MMHG

## 2022-12-06 DIAGNOSIS — Z13.5 SCREENING FOR DIABETIC RETINOPATHY: ICD-10-CM

## 2022-12-06 DIAGNOSIS — E11.65 TYPE 2 DIABETES MELLITUS WITH HYPERGLYCEMIA, WITH LONG-TERM CURRENT USE OF INSULIN (HCC): Primary | Chronic | ICD-10-CM

## 2022-12-06 DIAGNOSIS — Z23 ENCOUNTER FOR IMMUNIZATION: ICD-10-CM

## 2022-12-06 DIAGNOSIS — Z79.4 TYPE 2 DIABETES MELLITUS WITH HYPERGLYCEMIA, WITH LONG-TERM CURRENT USE OF INSULIN (HCC): Primary | Chronic | ICD-10-CM

## 2022-12-06 DIAGNOSIS — F17.200 TOBACCO DEPENDENCE: ICD-10-CM

## 2022-12-06 DIAGNOSIS — R41.89 BRAIN FOG: ICD-10-CM

## 2022-12-06 DIAGNOSIS — F41.9 ANXIETY: ICD-10-CM

## 2022-12-06 DIAGNOSIS — S78.111A ABOVE-KNEE AMPUTATION OF RIGHT LOWER EXTREMITY (HCC): ICD-10-CM

## 2022-12-06 DIAGNOSIS — R31.9 HEMATURIA, UNSPECIFIED TYPE: ICD-10-CM

## 2022-12-06 LAB — SL AMB POCT HEMOGLOBIN AIC: 7.4 (ref ?–6.5)

## 2022-12-06 RX ORDER — INSULIN GLARGINE 100 [IU]/ML
29 INJECTION, SOLUTION SUBCUTANEOUS
Qty: 10 ML | Refills: 3 | Status: SHIPPED | OUTPATIENT
Start: 2022-12-06

## 2022-12-06 RX ORDER — HYDROXYZINE HYDROCHLORIDE 25 MG/1
25 TABLET, FILM COATED ORAL EVERY 6 HOURS PRN
Qty: 30 TABLET | Refills: 0 | Status: SHIPPED | OUTPATIENT
Start: 2022-12-06

## 2022-12-06 RX ORDER — LIDOCAINE 40 MG/G
CREAM TOPICAL 4 TIMES DAILY PRN
Qty: 30 G | Refills: 0 | Status: SHIPPED | OUTPATIENT
Start: 2022-12-06

## 2022-12-06 RX ORDER — NICOTINE 21 MG/24HR
1 PATCH, TRANSDERMAL 24 HOURS TRANSDERMAL EVERY 24 HOURS
Qty: 28 PATCH | Refills: 0 | Status: SHIPPED | OUTPATIENT
Start: 2022-12-06

## 2022-12-07 ENCOUNTER — TELEPHONE (OUTPATIENT)
Dept: ENDOCRINOLOGY | Facility: CLINIC | Age: 62
End: 2022-12-07

## 2022-12-07 ENCOUNTER — APPOINTMENT (OUTPATIENT)
Dept: LAB | Facility: HOSPITAL | Age: 62
End: 2022-12-07

## 2022-12-07 ENCOUNTER — TELEPHONE (OUTPATIENT)
Dept: ADMINISTRATIVE | Facility: OTHER | Age: 62
End: 2022-12-07

## 2022-12-07 ENCOUNTER — HOME CARE VISIT (OUTPATIENT)
Dept: HOME HEALTH SERVICES | Facility: HOME HEALTHCARE | Age: 62
End: 2022-12-07

## 2022-12-07 VITALS
RESPIRATION RATE: 18 BRPM | TEMPERATURE: 96.3 F | HEART RATE: 88 BPM | OXYGEN SATURATION: 97 % | DIASTOLIC BLOOD PRESSURE: 82 MMHG | SYSTOLIC BLOOD PRESSURE: 112 MMHG

## 2022-12-07 DIAGNOSIS — R31.9 HEMATURIA, UNSPECIFIED TYPE: ICD-10-CM

## 2022-12-07 DIAGNOSIS — R41.89 BRAIN FOG: ICD-10-CM

## 2022-12-07 LAB
ALBUMIN SERPL BCP-MCNC: 3.1 G/DL (ref 3.5–5)
ALP SERPL-CCNC: 155 U/L (ref 46–116)
ALT SERPL W P-5'-P-CCNC: 18 U/L (ref 12–78)
ANION GAP SERPL CALCULATED.3IONS-SCNC: 3 MMOL/L (ref 4–13)
AST SERPL W P-5'-P-CCNC: 12 U/L (ref 5–45)
BACTERIA UR QL AUTO: ABNORMAL /HPF
BASOPHILS # BLD AUTO: 0.09 THOUSANDS/ÂΜL (ref 0–0.1)
BASOPHILS NFR BLD AUTO: 1 % (ref 0–1)
BILIRUB SERPL-MCNC: 0.38 MG/DL (ref 0.2–1)
BILIRUB UR QL STRIP: NEGATIVE
BUN SERPL-MCNC: 18 MG/DL (ref 5–25)
CALCIUM ALBUM COR SERPL-MCNC: 10.6 MG/DL (ref 8.3–10.1)
CALCIUM SERPL-MCNC: 9.9 MG/DL (ref 8.3–10.1)
CHLORIDE SERPL-SCNC: 108 MMOL/L (ref 96–108)
CLARITY UR: CLEAR
CO2 SERPL-SCNC: 28 MMOL/L (ref 21–32)
COLOR UR: ABNORMAL
CREAT SERPL-MCNC: 1.06 MG/DL (ref 0.6–1.3)
EOSINOPHIL # BLD AUTO: 0.68 THOUSAND/ÂΜL (ref 0–0.61)
EOSINOPHIL NFR BLD AUTO: 7 % (ref 0–6)
ERYTHROCYTE [DISTWIDTH] IN BLOOD BY AUTOMATED COUNT: 13.9 % (ref 11.6–15.1)
GFR SERPL CREATININE-BSD FRML MDRD: 73 ML/MIN/1.73SQ M
GLUCOSE SERPL-MCNC: 197 MG/DL (ref 65–140)
GLUCOSE UR STRIP-MCNC: ABNORMAL MG/DL
HCT VFR BLD AUTO: 41.1 % (ref 36.5–49.3)
HGB BLD-MCNC: 12.9 G/DL (ref 12–17)
HGB UR QL STRIP.AUTO: NEGATIVE
HYALINE CASTS #/AREA URNS LPF: ABNORMAL /LPF
IMM GRANULOCYTES # BLD AUTO: 0.02 THOUSAND/UL (ref 0–0.2)
IMM GRANULOCYTES NFR BLD AUTO: 0 % (ref 0–2)
KETONES UR STRIP-MCNC: NEGATIVE MG/DL
LEUKOCYTE ESTERASE UR QL STRIP: ABNORMAL
LYMPHOCYTES # BLD AUTO: 2.37 THOUSANDS/ÂΜL (ref 0.6–4.47)
LYMPHOCYTES NFR BLD AUTO: 24 % (ref 14–44)
MCH RBC QN AUTO: 27.9 PG (ref 26.8–34.3)
MCHC RBC AUTO-ENTMCNC: 31.4 G/DL (ref 31.4–37.4)
MCV RBC AUTO: 89 FL (ref 82–98)
MONOCYTES # BLD AUTO: 0.71 THOUSAND/ÂΜL (ref 0.17–1.22)
MONOCYTES NFR BLD AUTO: 7 % (ref 4–12)
NEUTROPHILS # BLD AUTO: 5.84 THOUSANDS/ÂΜL (ref 1.85–7.62)
NEUTS SEG NFR BLD AUTO: 61 % (ref 43–75)
NITRITE UR QL STRIP: NEGATIVE
NON-SQ EPI CELLS URNS QL MICRO: ABNORMAL /HPF
NRBC BLD AUTO-RTO: 0 /100 WBCS
PH UR STRIP.AUTO: 5.5 [PH]
PLATELET # BLD AUTO: 288 THOUSANDS/UL (ref 149–390)
PMV BLD AUTO: 11.1 FL (ref 8.9–12.7)
POTASSIUM SERPL-SCNC: 5 MMOL/L (ref 3.5–5.3)
PROT SERPL-MCNC: 8.5 G/DL (ref 6.4–8.4)
PROT UR STRIP-MCNC: NEGATIVE MG/DL
PSA SERPL-MCNC: 0.2 NG/ML (ref 0–4)
RBC # BLD AUTO: 4.62 MILLION/UL (ref 3.88–5.62)
RBC #/AREA URNS AUTO: ABNORMAL /HPF
SODIUM SERPL-SCNC: 139 MMOL/L (ref 135–147)
SP GR UR STRIP.AUTO: 1.03 (ref 1–1.03)
UROBILINOGEN UR STRIP-ACNC: <2 MG/DL
WBC # BLD AUTO: 9.71 THOUSAND/UL (ref 4.31–10.16)
WBC #/AREA URNS AUTO: ABNORMAL /HPF

## 2022-12-07 NOTE — LETTER
Diabetic Eye Exam Form    Date Requested: 22  Patient: Fide Hinojosa  Patient : 1960   Referring Provider: VERONICA Roa      DIABETIC Eye Exam Date _______________________________      Type of Exam MUST be documented for Diabetic Eye Exams  Please CHECK ONE  Retinal Exam       Dilated Retinal Exam       OCT       Optomap-Iris Exam      Fundus Photography       Left Eye - Please check Retinopathy or No Retinopathy        Exam did show retinopathy    Exam did not show retinopathy       Right Eye - Please check Retinopathy or No Retinopathy       Exam did show retinopathy    Exam did not show retinopathy       Comments __________________________________________________________    Practice Providing Exam ______________________________________________    Exam Performed By (print name) _______________________________________      Provider Signature ___________________________________________________      These reports are needed for  compliance  Please fax this completed form and a copy of the Diabetic Eye Exam report to our office located at [place] [address] as soon as possible via [phone] attention Erica Santos: Phone [phone]  We thank you for your assistance in treating our mutual patient

## 2022-12-07 NOTE — TELEPHONE ENCOUNTER
Received referral  for Matt Kaye  [facility] for patient to contact office to schedule a Follow-up appointment with our [place] office or our other offices closed for patient      Last seen in the office on 01/19/2022 with Kamari Condon PA-C

## 2022-12-07 NOTE — TELEPHONE ENCOUNTER
Upon review of the In Basket request and the patient's chart, initial outreach has been made via fax to facility  Please see Contacts section for details       Thank you  Cara Lopez

## 2022-12-07 NOTE — TELEPHONE ENCOUNTER
----- Message from Nate Pinto sent at 12/6/2022  3:27 PM EST -----  Regarding: diabetic eye exam  12/06/22 3:28 PM    Hello, our patient Jose Bojorquez has had Diabetic Eye Exam completed/performed  Please assist in updating the patient chart by making an External outreach to [facility] facility located in [address] [place] [address], [address]  The date of service is within the last couple of months      Thank you,  Nate [facility]

## 2022-12-08 NOTE — PROGRESS NOTES
Name: To Moreno      : 1960      MRN: [id number]  Encounter Provider: VERONICA Abarca  Encounter Date: 2022   Encounter department: [address] [place] [address]  Type 2 diabetes mellitus with hyperglycemia, with long-term current use of insulin (HCC)  -     POCT hemoglobin A1c  -     Ambulatory Referral to Endocrinology; Future  -     insulin glargine (LANTUS) 100 units/mL subcutaneous injection; Inject 29 Units under the skin daily at bedtime    2  Screening for diabetic retinopathy    3  Above-knee amputation of right lower extremity (HCC)  -     lidocaine (LMX) 4 % cream; Apply topically 4 (four) times a day as needed for mild pain    4  Hematuria, unspecified type  -     Urine culture; Future  -     PSA, Total Screen; Future  -     UA w Reflex to Microscopic w Reflex to Culture -Lab Collect; Future; Expected date: 2022    5  Brain fog  -     CBC and differential; Future  -     Comprehensive metabolic panel; Future    6  Anxiety  -     hydrOXYzine HCL (ATARAX) 25 mg tablet; Take 1 tablet (25 mg total) by mouth every 6 (six) hours as needed for anxiety    7  Tobacco dependence  -     nicotine (Nicoderm CQ) 14 mg/24hr TD 24 hr patch; Place 1 patch on the skin every 24 hours    8  Encounter for immunization  -     influenza vaccine, quadrivalent, recombinant, PF, 0 5 mL, for patients 18 yr+ (FLUBLOK)         Subjective      HPI  Patient presents for routine follow up  Was recently in the hospital due to a non-healing ulcer of the left great toe which he underwent an angiogram to help open blockages  Successful procedure  Ulceration is healing better now  Patient denies any discharge from the area, warmth, redness or swelling  After the procedure, he did have a few small clots in his urine and some blood in the urinal  He states it lasted for about 4 days but has resolved  Denies any urgency, frequency, or pain with urination   Will check a urine to ensure no infection  He also has been having periods where he feels brain fog- states this has been ongoing since he had covid but did worsen since his procedure  Sees wound care this week, continues f/u with vascular     DM- HGA1C has improved to 7 4 but is still above his goal  He is compliant with medications  Will increase lantus to 29 units  Return to endocrinology for their input due to long term complications and chronic insulin use  States he is UTD on his eye exam      Smoking- has not smoked since 11/28  He was using the nicotine 21 mg patches  Still has cravings but much better with the patch on  Would like to decrease mg  Will decrease to 14 mg and f u in 4 weeks  Anxiety- sometimes feels periods of severe anxiety  Will happen randomly not really a pattern to it  Not often  Does not feel like he needs something daily for anxiety  Will try Atarax prn  Side effects reviewed  Review of Systems   Constitutional: Negative for activity change, appetite change, chills, fever and unexpected weight change  HENT: Negative for congestion, ear pain and sore throat  Eyes: Negative for pain and visual disturbance  Respiratory: Negative for cough and shortness of breath  Cardiovascular: Negative for chest pain and palpitations  Gastrointestinal: Negative for abdominal pain, blood in stool, constipation, diarrhea, nausea and vomiting  Genitourinary: Negative for decreased urine volume, difficulty urinating, dysuria, flank pain, frequency, hematuria (resolved ) and urgency  Musculoskeletal: Negative for arthralgias and back pain  Skin: Negative for color change and rash  Neurological: Negative for dizziness, tremors, seizures, syncope, weakness, light-headedness and numbness  Psychiatric/Behavioral: The patient is nervous/anxious  All other systems reviewed and are negative        Current Outpatient Medications on File Prior to Visit   Medication Sig   • acetaminophen (TYLENOL) 325 mg tablet Take 3 tablets (975 mg total) by mouth every 8 (eight) hours   • albuterol (PROVENTIL HFA,VENTOLIN HFA) 90 mcg/act inhaler Inhale 2 puffs every 4 (four) hours as needed for wheezing   • aspirin 81 mg chewable tablet Chew 81 mg daily   • atorvastatin (LIPITOR) 20 mg tablet Take 1 tablet (20 mg total) by mouth daily   • bisacodyl (DULCOLAX) 5 mg EC tablet Take as instructed by GI office for bowel prep for colonoscopy   • Blood Glucose Monitoring Suppl (OneTouch Verio) w/Device KIT Use to test blood sugars 3 times daily   • cholecalciferol (VITAMIN D3) 1,000 units tablet Take 2 tablets (2,000 Units total) by mouth daily   • Continuous Blood Gluc  ([place] 2 [place]) JOSEPH Use as directed   • Continuous Blood Gluc Sensor (FreeStyle Ke 2 Sensor) MISC Change every 14 days   • Diclofenac Sodium (VOLTAREN) 1 % Apply 4 g topically 4 (four) times a day as needed (as needed for pain)   • DULoxetine (CYMBALTA) 60 mg delayed release capsule Take 1 capsule (60 mg total) by mouth daily at bedtime   • Eliquis 5 MG Take 1 tablet by mouth twice daily   • famotidine (PEPCID) 20 mg tablet Take 1 tablet (20 mg total) by mouth 2 (two) times a day   • fluticasone (FLONASE) 50 mcg/act nasal spray 1 spray into each nostril daily   • glucose blood test strip Use 1 each 3 (three) times daily after meals Use as instructed   • insulin lispro (HumaLOG KwikPen) 100 units/mL injection pen Blood Glucose 150 - 224: 4 Unit of Insulin Blood Glucose 225 - 299: 5 Units of Insulin Blood Glucose 300 - 374: 6 Units of Insulin Blood Glucose 375 - 449: 7 Units of Insulin Blood Glucose greater than or equal to 450: 8 Units of Insulin Max of 24 units per day   • Insulin Pen Needle 31G X 5 MM MISC Use daily Pt to inject 4 X daily   • Insulin Pen Needle 31G X 5 MM MISC 30 units sq 2X daily   • Insulin Syringe-Needle U-100 (BD Veo Insulin Syringe U/F) 31G X 15/64" 0 5 ML MISC Inject under the skin 3 (three) times a day Use as directed   • Janumet  MG per tablet TAKE 1 TABLET BY MOUTH TWICE DAILY WITH MEALS   • Jardiance 25 MG TABS TAKE 1 TABLET BY MOUTH ONCE DAILY IN THE MORNING   • levocetirizine (XYZAL) 5 MG tablet TAKE 1 TABLET BY MOUTH ONCE DAILY IN THE EVENING   • lisinopril (ZESTRIL) 10 mg tablet Take 1 tablet by mouth once daily   • magnesium oxide (MAG-OX) 400 mg Take 1 tablet (400 mg total) by mouth daily   • menthol-methyl salicylate (BENGAY) 28-75 % cream Apply topically 4 (four) times a day as needed (torticollis)   • methadone (DOLOPHINE) 10 mg tablet Take 70 mg by mouth daily,   • multivitamin-minerals (CENTRUM ADULTS) tablet Take 1 tablet by mouth daily   • naloxone (NARCAN) 4 mg/0 1 mL nasal spray Administer 1 spray into a nostril  If no response after 2-3 minutes, give another dose in the other nostril using a new spray  • ondansetron (ZOFRAN-ODT) 4 mg disintegrating tablet DISSOLVE 1 TABLET IN MOUTH EVERY 6 HOURS AS NEEDED FOR NAUSEA FOR VOMITING   • pantoprazole (PROTONIX) 40 mg tablet Take 1 tablet by mouth once daily   • polyethylene glycol (GOLYTELY) 4000 mL solution Take as instructed by GI office for bowel prep   • silver sulfadiazine (SILVADENE,SSD) 1 % cream Apply topically daily Apply as directed to left heel ulcer daily with each dressing change  Objective     /74 (BP Location: Left arm, Patient Position: Sitting)   Pulse 96   Temp 97 8 °F (36 6 °C) (Tympanic)   Ht 5' 8" (1 727 m)   SpO2 97%   BMI 27 37 kg/m²     Physical Exam  Vitals and nursing note reviewed  Constitutional:       General: He is not in acute distress  Appearance: Normal appearance  He is not ill-appearing or toxic-appearing  Cardiovascular:      Rate and Rhythm: Normal rate and regular rhythm  Pulses: Normal pulses  Heart sounds: Normal heart sounds  No murmur heard  No friction rub  No gallop  Pulmonary:      Effort: Pulmonary effort is normal       Breath sounds: Normal breath sounds   No wheezing, rhonchi or rales  Abdominal:      General: Abdomen is flat  Bowel sounds are normal  There is no distension  Palpations: Abdomen is soft  Tenderness: There is no abdominal tenderness  There is no guarding  Hernia: No hernia is present  Musculoskeletal:      Comments: Right AKA   Skin:     General: Skin is warm  Findings: No bruising, erythema or lesion  Comments: Ulcer on the left great toe, no warmth, erythema or tenderness, no drainage    Neurological:      General: No focal deficit present  Mental Status: He is alert and oriented to person, place, and time  Mental status is at baseline  Sensory: No sensory deficit  Motor: No weakness  Gait: Abnormal gait: in wheelchair    Psychiatric:         Mood and Affect: Mood normal          Behavior: Behavior normal          Thought Content:  Thought content normal          Judgment: Judgment normal        VERONICA Gayle

## 2022-12-08 NOTE — TELEPHONE ENCOUNTER
Upon review of the In Basket request we were able to locate, review, and update the patient chart as requested for Diabetic Eye Exam     Any additional questions or concerns should be emailed to the Practice Liaisons via the appropriate education email address, please do not reply via In Basket      Thank you  Amador Garg

## 2022-12-13 ENCOUNTER — OFFICE VISIT (OUTPATIENT)
Dept: WOUND CARE | Facility: CLINIC | Age: 62
End: 2022-12-13

## 2022-12-13 VITALS
TEMPERATURE: 98 F | SYSTOLIC BLOOD PRESSURE: 145 MMHG | RESPIRATION RATE: 20 BRPM | HEART RATE: 96 BPM | DIASTOLIC BLOOD PRESSURE: 79 MMHG

## 2022-12-13 DIAGNOSIS — M86.9 TOE OSTEOMYELITIS, LEFT (HCC): Primary | ICD-10-CM

## 2022-12-13 DIAGNOSIS — E11.621 DIABETIC ULCER OF TOE OF LEFT FOOT ASSOCIATED WITH TYPE 2 DIABETES MELLITUS, WITH BONE INVOLVEMENT WITHOUT EVIDENCE OF NECROSIS (HCC): ICD-10-CM

## 2022-12-13 DIAGNOSIS — L97.526 DIABETIC ULCER OF TOE OF LEFT FOOT ASSOCIATED WITH TYPE 2 DIABETES MELLITUS, WITH BONE INVOLVEMENT WITHOUT EVIDENCE OF NECROSIS (HCC): ICD-10-CM

## 2022-12-13 DIAGNOSIS — R74.8 ELEVATED ALKALINE PHOSPHATASE LEVEL: Primary | ICD-10-CM

## 2022-12-13 NOTE — PROGRESS NOTES
Patient ID: Trista Lauren is a 57 y o  male Date of Birth 1960     Diagnosis:  1  Toe osteomyelitis, left ([facility] [address] )    2  Diabetic ulcer of toe of left foot associated with type 2 diabetes mellitus, with bone involvement without evidence of necrosis ([facility] [address] )       Diagnosis ICD-10-CM Associated Orders   1  Toe osteomyelitis, left ([facility])  M86 9       2  Diabetic ulcer of toe of left foot associated with type 2 diabetes mellitus, with bone involvement without evidence of necrosis [facility])  E11 621     [id number]            Assessment & Plan:  1  Left Hallux OM   2  Diabetic ulcer left hallux  with exposed bone  2  Diabetic neuropathy   3  PAD  4  Hx of Right AKA      Plan:     - I recommend surgical removal of infected bone (left hallux amputation) given chronic ulcer with exposed 1st PIPJ  Patients Nephew interrupted conversation and became verbally aggressive/upset hearing treatment plan for Shahbaz  Nephew reports he did not know about amputation conversations  Patient and his nephew were repeatly informed during past few visits to wound care center regarding risk of limb loss/amputation given DM, PAD and open ulcer with exposed bone  Clinically wound is stable for which he does not require PO antibiotics  I also do not think IV antibiotics is a viable option for this patient given comorbidies  He was encouraged to obtain ID consultation for further management if interested with IV abx treatments  At this time I recommend Shahbaz to obtain second opinion for further foot management  I offered list of multiple physicians information for second opinion  Until patient finds new physician I will continue to care for his wound with local wound Maxorb [employer] and DSD    - Expresses understanding    - Vascular surgery notes reviewed   - Return in 2 weeks if wishes to go forward with recommended treatments          Lab Results   Component Value Date    HGBA1C 7 4 (A) 12/06/2022         Chief Complaint   Patient presents with   • Follow Up Wound Care Visit     Left foot great toe ulcer            Subjective:   Patient presents with nephew for continued wound treatments to the left hallux  He is s/p LLE agram  Denies n/v/f/chills         The following portions of the patient's history were reviewed and updated as appropriate:   Patient Active Problem List   Diagnosis   • Chronic hepatitis C without hepatic coma ([place] [address] )   • Essential hypertension   • Hyperlipidemia associated with type 2 diabetes mellitus ([place] [address] )   • Methadone maintenance therapy patient ([place] [address] )   • Type 2 diabetes mellitus with diabetic polyneuropathy ([place] [address] )   • Diabetic ulcer of toe of left foot associated with type 2 diabetes mellitus, with bone involvement without evidence of necrosis (HCC)   • Bilateral lower extremity edema   • Positive depression screening   • Mononeuropathy, unspecified   • Mixed hyperlipidemia   • Proteinuria   • Vitamin D deficiency   • Type 2 diabetes mellitus, with long-term current use of insulin (HCC)   • Acute respiratory failure with hypoxia (HCC)   • Hematuria   • Aortic thrombus (HCC)   • Prolonged Q-T interval on ECG   • Empyema lung (HCC)   • S/P AKA (above knee amputation), right (HCC)   • Cervical radiculopathy   • Chronic bilateral low back pain   • Above-knee amputation of right lower extremity (HCC)   • Drug-induced constipation   • Moderate protein-calorie malnutrition (HCC)   • Chronic pain syndrome   • Chronic hyponatremia   • Depression   • History of COVID-19   • Pulmonary fibrosis (HCC)   • Foot drop, left   • PAD (peripheral artery disease) (HCC)   • Diabetic neuropathy ([place] [address] )     Past Medical History:   Diagnosis Date   • Arthritis    • Diabetes mellitus ([place] [address] )    • DVT (deep venous thrombosis) ([facility])    • GERD (gastroesophageal reflux disease)    • History of COVID-19 2020    severe disease, intubated/ICU x 1 month   • History of drug abuse ([place] [address] )     heroin abuse, clean since 2003   • Hypercholesteremia    • Hypertension    • Ischemia of right lower extremity with suspected rhabdomyolysis 2021   • Left Hydropneumothorax 02/10/2021   • Methadone use    • Pneumonia due to COVID-19 virus 2021   • Subacute osteomyelitis of right femur ([address] ) 2021   • Vascular disorder of lower extremity      Past Surgical History:   Procedure Laterality Date   • AMPUTATION ABOVE KNEE (AKA) Right 2021    Procedure: AMPUTATION ABOVE KNEE (AKA);   Surgeon: Raquel Perez MD;  [facility];  Service: Vascular   • AMPUTATION ABOVE KNEE (AKA) Right 2021    Procedure: AMPUTATION ABOVE KNEE (AKA) FORMALIZATION,  R AKA wound washout, wound closure;  Surgeon: Raquel Perez MD;  [facility];  Service: Vascular   • ARTERIOGRAM Right 2021    Procedure: ARTERIOGRAM;  Surgeon: Nahun Islas DO;  [facility];  Service: Vascular   • BRONCHOSCOPY     • IR CHEST TUBE PLACEMENT  02/10/2021   • IR LOWER EXTREMITY ANGIOGRAM  2021   • IR LOWER EXTREMITY ANGIOGRAM  2022   • FL SLCTV CATHJ 3RD+ ORD SLCTV ABDL PEL/TR [facility] Left 2022    Procedure: LEG AGRAM W/ INTERVENTION;  Surgeon: Brandyn Guerra MD;  Location: [facility];  Service: Vascular   • THROMBECTOMY W/ EMBOLECTOMY Right 2021    Procedure: EMBOLECTOMY/THROMBECTOMY LOWER EXTREMITY;  Surgeon: Nahun Islas DO;  [facility];  Service: Vascular     Social History     Socioeconomic History   • Marital status:      Spouse name: None   • Number of children: None   • Years of education: None   • Highest education level: None   Occupational History   • None   Tobacco Use   • Smoking status: Former     Packs/day: 0 50     Types: Cigarettes     Quit date: 2022     Years since quittin 0   • Smokeless tobacco: Never   Vaping Use   • Vaping Use: Never used   Substance and Sexual Activity   • Alcohol use: Yes     Comment: rare   • Drug use: Not Currently     Types: Heroin     Comment: clean since , now on methadone   • Sexual activity: None   Other Topics Concern   • None   Social History Narrative   • None     Social Determinants of Health     Financial Resource Strain: Not on file   Food Insecurity: Not on file   Transportation Needs: Not on file   Physical Activity: Not on file   Stress: Not on file   Social Connections: Not on file   Intimate Partner Violence: Not on file   Housing Stability: Not on file        Current Outpatient Medications:   •  acetaminophen (TYLENOL) 325 mg tablet, Take 3 tablets (975 mg total) by mouth every 8 (eight) hours, Disp: , Rfl: 0  •  albuterol (PROVENTIL HFA,VENTOLIN HFA) 90 mcg/act inhaler, Inhale 2 puffs every 4 (four) hours as needed for wheezing, Disp: , Rfl: 0  •  aspirin 81 mg chewable tablet, Chew 81 mg daily, Disp: , Rfl:   •  atorvastatin (LIPITOR) 20 mg tablet, Take 1 tablet (20 mg total) by mouth daily, Disp: 90 tablet, Rfl: 3  •  bisacodyl (DULCOLAX) 5 mg EC tablet, Take as instructed by GI office for bowel prep for colonoscopy, Disp: 2 tablet, Rfl: 0  •  Blood Glucose Monitoring Suppl (OneTouch Verio) w/Device KIT, Use to test blood sugars 3 times daily, Disp: 1 kit, Rfl: 0  •  cholecalciferol (VITAMIN D3) 1,000 units tablet, Take 2 tablets (2,000 Units total) by mouth daily, Disp: 12 tablet, Rfl: 0  •  Continuous Blood Gluc  (FreeStyle Ke 2 Langston) JOSEPH, Use as directed, Disp: 1 each, Rfl: 0  •  Continuous Blood Gluc Sensor (FreeStyle Ke 2 Sensor) MISC, Change every 14 days, Disp: 6 each, Rfl: 2  •  Diclofenac Sodium (VOLTAREN) 1 %, Apply 4 g topically 4 (four) times a day as needed (as needed for pain), Disp: 50 g, Rfl: 1  •  DULoxetine (CYMBALTA) 60 mg delayed release capsule, Take 1 capsule (60 mg total) by mouth daily at bedtime, Disp: 30 capsule, Rfl: 2  •  Eliquis 5 MG, Take 1 tablet by mouth twice daily, Disp: 180 tablet, Rfl: 0  •  famotidine (PEPCID) 20 mg tablet, Take 1 tablet (20 mg total) by mouth 2 (two) times a day, Disp: 30 tablet, Rfl: 8  •  fluticasone (FLONASE) 50 mcg/act nasal spray, 1 spray into each nostril daily, Disp: 16 g, Rfl: 2  •  glucose blood test strip, Use 1 each 3 (three) times daily after meals Use as instructed, Disp: 270 each, Rfl: 3  •  hydrOXYzine HCL (ATARAX) 25 mg tablet, Take 1 tablet (25 mg total) by mouth every 6 (six) hours as needed for anxiety, Disp: 30 tablet, Rfl: 0  •  insulin glargine (LANTUS) 100 units/mL subcutaneous injection, Inject 29 Units under the skin daily at bedtime, Disp: 10 mL, Rfl: 3  •  insulin lispro (HumaLOG KwikPen) 100 units/mL injection pen, Blood Glucose 150 - 224: 4 Unit of Insulin Blood Glucose 225 - 299: 5 Units of Insulin Blood Glucose 300 - 374: 6 Units of Insulin Blood Glucose 375 - 449: 7 Units of Insulin Blood Glucose greater than or equal to 450: 8 Units of Insulin Max of 24 units per day, Disp: 15 mL, Rfl: 3  •  Insulin Pen Needle 31G X 5 MM MISC, Use daily Pt to inject 4 X daily, Disp: 100 each, Rfl: 5  •  Insulin Pen Needle 31G X 5 MM MISC, 30 units sq 2X daily, Disp: 100 each, Rfl: 3  •  Insulin Syringe-Needle U-100 (BD Veo Insulin Syringe U/F) 31G X 15/64" 0 5 ML MISC, Inject under the skin 3 (three) times a day Use as directed, Disp: 300 each, Rfl: 5  •  Janumet  MG per tablet, TAKE 1 TABLET BY MOUTH TWICE DAILY WITH MEALS, Disp: 180 tablet, Rfl: 0  •  Jardiance 25 MG TABS, TAKE 1 TABLET BY MOUTH ONCE DAILY IN THE MORNING, Disp: 30 tablet, Rfl: 0  •  levocetirizine (XYZAL) 5 MG tablet, TAKE 1 TABLET BY MOUTH ONCE DAILY IN THE EVENING, Disp: 90 tablet, Rfl: 0  •  lidocaine (LMX) 4 % cream, Apply topically 4 (four) times a day as needed for mild pain, Disp: 30 g, Rfl: 0  •  lisinopril (ZESTRIL) 10 mg tablet, Take 1 tablet by mouth once daily, Disp: 90 tablet, Rfl: 0  •  magnesium oxide (MAG-OX) 400 mg, Take 1 tablet (400 mg total) by mouth daily, Disp: 90 tablet, Rfl: 1  •  menthol-methyl salicylate (BENGAY) 39-36 % cream, Apply topically 4 (four) times a day as needed (torticollis), Disp: , Rfl: 0  •  methadone (DOLOPHINE) 10 mg tablet, Take 70 mg by mouth daily,, Disp: , Rfl:   •  multivitamin-minerals (CENTRUM ADULTS) tablet, Take 1 tablet by mouth daily, Disp: , Rfl: 0  •  naloxone (NARCAN) 4 mg/0 1 mL nasal spray, Administer 1 spray into a nostril  If no response after 2-3 minutes, give another dose in the other nostril using a new spray , Disp: 1 each, Rfl: 1  •  nicotine (Nicoderm CQ) 14 mg/24hr TD 24 hr patch, Place 1 patch on the skin every 24 hours, Disp: 28 patch, Rfl: 0  •  ondansetron (ZOFRAN-ODT) 4 mg disintegrating tablet, DISSOLVE 1 TABLET IN MOUTH EVERY 6 HOURS AS NEEDED FOR NAUSEA FOR VOMITING, Disp: 40 tablet, Rfl: 0  •  pantoprazole (PROTONIX) 40 mg tablet, Take 1 tablet by mouth once daily, Disp: 30 tablet, Rfl: 0  •  polyethylene glycol (GOLYTELY) 4000 mL solution, Take as instructed by GI office for bowel prep, Disp: 4000 mL, Rfl: 0  •  silver sulfadiazine (SILVADENE,SSD) 1 % cream, Apply topically daily Apply as directed to left heel ulcer daily with each dressing change , Disp: 25 g, Rfl: 2  Family History   Problem Relation Age of Onset   • Diabetes Mother    • Hypertension Father    • Leukemia Father    • Acute lymphoblastic leukemia Father    • Cancer Sister       Review of Systems   All other systems reviewed and are negative  Allergies:  Varenicline      Objective:  /79   Pulse 96   Temp 98 °F (36 7 °C)   Resp 20     Physical Exam  Vitals reviewed  Cardiovascular:      Pulses:           Dorsalis pedis pulses are detected w/ Doppler on the left side  Posterior tibial pulses are detected w/ Doppler on the left side  Feet:      Left foot:      Skin integrity: Ulcer present  Comments: Left hallux proximal interphalangeal joint with exposed bone  The wound is mixture of fibrotic and granular  Clinically the wound appears stable  No purulent drainage noted  Wound 10/25/22 Diabetic Ulcer Toe (Comment  which one) Anterior; Left (Active) Wound Image   12/13/22 1510   Wound Description Exposed bone;Pink;Yellow 12/13/22 1510   Renuka-wound Assessment Pink; Maceration 12/13/22 1510   Wound Length (cm) 0 9 cm 12/13/22 1510   Wound Width (cm) 1 cm 12/13/22 1510   Wound Depth (cm) 0 1 cm 12/13/22 1510   Wound Surface Area (cm^2) 0 9 cm^2 12/13/22 1510   Wound Volume (cm^3) 0 09 cm^3 12/13/22 1510   Calculated Wound Volume (cm^3) 0 09 cm^3 12/13/22 1510   Change in Wound Size % -28 57 12/13/22 1510   Drainage Amount Moderate 12/13/22 1510   Drainage Description Serous 12/13/22 1510   Non-staged Wound Description Full thickness 12/13/22 1510   Treatments Cleansed 12/13/22 1510   Patient Tolerance Tolerated well 12/13/22 1510                         Procedures             Wound Instructions:  No orders of the defined types were placed in this encounter  Fatemeh Peralta DPM      Portions of the record may have been created with voice recognition software  Occasional wrong word or "sound a like" substitutions may have occurred due to the inherent limitations of voice recognition software  Read the chart carefully and recognize, using context, where substitutions have occurred

## 2022-12-13 NOTE — PATIENT INSTRUCTIONS
Orders Placed This Encounter   Procedures    Wound cleansing and dressings     Left great toe     Wash your hands with soap and water  Remove old dressing, discard into plastic bag and place in trash  Cleanse the ulcer with normal saline prior to applying a clean dressing  Do not use tissue or cotton balls  Do not scrub the ulceer  Pat dry using gauze  Shower yes DO NOT GET DRESSING WET  Apply silver alginate to left great toe ulcer  Cover with gauze  Secure with kenya and tape  Change dressing three times per week and as needed for excessive drainage or leakage  Monitor for signs and symptons of infection such as  Redness,  Swelling,  Increased drainage, or malodor go to be emergency room to be evaluated  Elevate lower leg on pillow so no pressure is on your heel       Follow up with Dr Fu or Dr Nadeem Peguero     Standing Status:   Future     Standing Expiration Date:   12/13/2023

## 2022-12-13 NOTE — PROGRESS NOTES
Patient's nephew was in the room when Dr Anand Morocho was talking to patient  Nephew became arguementative with Doctor with not understanding what Dr Anand Morocho was saying to the patient   Dr Anand Morocho informed patient and his nephew that he should seek a second opinion

## 2022-12-14 ENCOUNTER — TELEPHONE (OUTPATIENT)
Dept: SURGERY | Facility: CLINIC | Age: 62
End: 2022-12-14

## 2022-12-14 ENCOUNTER — HOME CARE VISIT (OUTPATIENT)
Dept: HOME HEALTH SERVICES | Facility: HOME HEALTHCARE | Age: 62
End: 2022-12-14

## 2022-12-14 ENCOUNTER — TELEPHONE (OUTPATIENT)
Dept: GASTROENTEROLOGY | Facility: MEDICAL CENTER | Age: 62
End: 2022-12-14

## 2022-12-14 ENCOUNTER — OFFICE VISIT (OUTPATIENT)
Dept: VASCULAR SURGERY | Facility: CLINIC | Age: 62
End: 2022-12-14

## 2022-12-14 VITALS
HEIGHT: 68 IN | DIASTOLIC BLOOD PRESSURE: 90 MMHG | HEART RATE: 96 BPM | BODY MASS INDEX: 27.37 KG/M2 | SYSTOLIC BLOOD PRESSURE: 132 MMHG

## 2022-12-14 DIAGNOSIS — L97.526 DIABETIC ULCER OF TOE OF LEFT FOOT ASSOCIATED WITH TYPE 2 DIABETES MELLITUS, WITH BONE INVOLVEMENT WITHOUT EVIDENCE OF NECROSIS (HCC): Primary | ICD-10-CM

## 2022-12-14 DIAGNOSIS — E11.621 DIABETIC ULCER OF TOE OF LEFT FOOT ASSOCIATED WITH TYPE 2 DIABETES MELLITUS, WITH BONE INVOLVEMENT WITHOUT EVIDENCE OF NECROSIS (HCC): Primary | ICD-10-CM

## 2022-12-14 DIAGNOSIS — L97.909 ATHEROSCLEROSIS OF ARTERY OF EXTREMITY WITH ULCERATION (HCC): ICD-10-CM

## 2022-12-14 DIAGNOSIS — I70.299 ATHEROSCLEROSIS OF ARTERY OF EXTREMITY WITH ULCERATION (HCC): ICD-10-CM

## 2022-12-14 RX ORDER — CEPHALEXIN 500 MG/1
500 CAPSULE ORAL EVERY 12 HOURS SCHEDULED
Qty: 14 CAPSULE | Refills: 0 | Status: SHIPPED | OUTPATIENT
Start: 2022-12-14 | End: 2022-12-21

## 2022-12-14 NOTE — TELEPHONE ENCOUNTER
----- Message from [facility] [facility] sent at 12/13/2022  5:18 PM EST -----  Please let patient know that one of his liver function tests came back elevated 9 alkaline phosphatase)  I would like him to undergo further lab work to evaluate for cause of this as well as an ultrasound to look at his liver further

## 2022-12-14 NOTE — TELEPHONE ENCOUNTER
----- Message from [address] [facility] sent at 12/13/2022  5:18 PM EST -----  Please let patient know that one of his liver function tests came back elevated 9 alkaline phosphatase)  I would like him to undergo further lab work to evaluate for cause of this as well as an ultrasound to look at his liver further

## 2022-12-14 NOTE — PROGRESS NOTES
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 and formalization of R AKA by Dr Kathryn Luu 1/18/21, PAD w/ L great toe wound now s/p L SFA PTA/stenting 11/28/22 Shaquille Altamirano) present for post agram follow up  - patient is accompanied by nephew Placido Vargas  -Patient and nephew report patient was doing well with improvement to left great toe ulceration up until yesterday 12/13/2022  - Left great toe erythematous and edematous   -Patient was seen by podiatry yesterday recommending amputation, however this came as a shock to patient and nephew as it was their understanding they were going to try debridement and long-term antibiotics in an attempt to salvage the toe  Seeking second opinion next week 12/20/2022  -Foot MRI 11/4/2022 concerning for OM  -Palpable left AT with robust biphasic DP signal   Motor sensor intact  -L great toe ulceration clinical picture below    Plan:  -Given reported change of the toe we will start antibiotic course until patient is able to be seen by  new podiatrist for second opinion 12/20/2022  Keflex 500 mg p o  twice daily 7-day  -Post intervention LEAD  -Follow-up with podiatry as scheduled for plan regarding L great toe intervention  LLE blood flow is optimized from a vascular standpoint   -Continue ASA, Eliquis and statin  -Continue local wound care with silver alginate and dry gauze dressing   -Monitor for signs and symptoms of infection including worsening erythema, pain, purulent drainage, fever or chills    -cc Dr Kathryn Luu         Diagnoses and all orders for this visit:    Diabetic ulcer of toe of left foot associated with type 2 diabetes mellitus, with bone involvement without evidence of necrosis (HCC)  -     VAS lower limb arterial duplex, limited/unilateral; Future  -     cephalexin (KEFLEX) 500 mg capsule; Take 1 capsule (500 mg total) by mouth every 12 (twelve) hours for 7 days    Atherosclerosis of artery of extremity with ulceration (HCC)  -     VAS lower limb arterial duplex, limited/unilateral; Future          Subjective:      Patient ID: Syed Barbour is a 57 y o  male  Patient underwent LLE agram w/ intervention on 11/28 for non-healing L great toe ulcer and presents today for f/u visit  Patient continues to see podiatry weekly  HPI  See assessment and plan    [place] Erick presents accompanied by nephew  He is s/p LLE angiogram with SFA PTA/stenting 2/2 left great toe ulceration 11/28/2022  Patient had new ulceration to left great toe suspicious for OM by MRI 11/4/2022  Patient SFA treated with PTA and stenting with AT runoff  He now has palpable AT and robust DP signal   Patient nephew reports seen by podiatry yesterday and rather shocked that podiatry mentioning amputation  They were under the impression of trying to improve blood flow to first attempt at wound healing and reserve amputation for last ditch effort  Discussed at length LLE blood flow is optimized from a vascular standpoint  Given the change, erythema and edema to left great toe will start on short course p o  antibiotics until patient is able to be seen by podiatrist for second opinion next week 12/20/2022  We will also obtain postintervention LEAD with updated CHATO  Continue local wound care and close monitoring for worsening wound including increased drainage, odor, fever or chills        The following portions of the patient's history were reviewed and updated as appropriate: allergies, current medications, past family history, past medical history, past social history, past surgical history and problem list     Review of Systems   Constitutional: Negative  HENT: Negative  Eyes: Negative  Respiratory: Negative  Cardiovascular: Positive for leg swelling  Gastrointestinal: Negative  Endocrine: Negative  Genitourinary: Negative  Musculoskeletal: Positive for gait problem  Skin: Positive for wound (L great toe)  Allergic/Immunologic: Negative  Hematological: Negative  Psychiatric/Behavioral: Negative  Objective:      /90 (BP Location: Right arm, Patient Position: Sitting)   Pulse 96   Ht 5' 8" (1 727 m)   BMI 27 37 kg/m²          Physical Exam  Vitals reviewed  Cardiovascular:      Pulses:           Posterior tibial pulses are detected w/ Doppler on the left side  Comments: Palpable AT, Robust biphasic DP/AT doppler signnals  Musculoskeletal:      Comments: Wheelchair, R AKA    L foot warm, perfused motor/ sensory intact   Skin:     General: Skin is warm  Comments: L great toe ulceration non draining, erythematous and edematous (clinical picture below)   Neurological:      Mental Status: He is alert and oriented to person, place, and time  Sensory: No sensory deficit  Motor: No weakness     Psychiatric:         Behavior: Behavior normal                      Vitals:    12/14/22 1543   BP: 132/90   BP Location: Right arm   Patient Position: Sitting   Pulse: 96   Height: 5' 8" (1 727 m)       Patient Active Problem List   Diagnosis   • Chronic hepatitis C without hepatic coma (HCC)   • Essential hypertension   • Hyperlipidemia associated with type 2 diabetes mellitus ([facility] [address] )   • Methadone maintenance therapy patient ([facility] [address] )   • Type 2 diabetes mellitus with diabetic polyneuropathy ([facility] [address] )   • Diabetic ulcer of toe of left foot associated with type 2 diabetes mellitus, with bone involvement without evidence of necrosis (HCC)   • Bilateral lower extremity edema   • Positive depression screening   • Mononeuropathy, unspecified   • Mixed hyperlipidemia • Proteinuria   • Vitamin D deficiency   • Type 2 diabetes mellitus, with long-term current use of insulin (HCC)   • Acute respiratory failure with hypoxia (HCC)   • Hematuria   • Aortic thrombus (HCC)   • Prolonged Q-T interval on ECG   • Empyema lung (HCC)   • S/P AKA (above knee amputation), right (HCC)   • Cervical radiculopathy   • Chronic bilateral low back pain   • Above-knee amputation of right lower extremity (HCC)   • Drug-induced constipation   • Moderate protein-calorie malnutrition (HCC)   • Chronic pain syndrome   • Chronic hyponatremia   • Depression   • History of COVID-19   • Pulmonary fibrosis (HCC)   • Foot drop, left   • PAD (peripheral artery disease) (HCC)   • Diabetic neuropathy (HCC)       Past Surgical History:   Procedure Laterality Date   • AMPUTATION ABOVE KNEE (AKA) Right 01/14/2021    Procedure: AMPUTATION ABOVE KNEE (AKA);   Surgeon: Roland Franklin MD;  [facility]: [facility];  Service: Vascular   • AMPUTATION ABOVE KNEE (AKA) Right 01/18/2021    Procedure: AMPUTATION ABOVE KNEE (AKA) FORMALIZATION,  R AKA wound washout, wound closure;  Surgeon: Roland Franklin MD;  [facility];  Service: Vascular   • ARTERIOGRAM Right 01/13/2021    Procedure: ARTERIOGRAM;  Surgeon: Yifan Daugherty DO;  [facility]: [facility];  Service: Vascular   • BRONCHOSCOPY     • IR CHEST TUBE PLACEMENT  02/10/2021   • IR LOWER EXTREMITY ANGIOGRAM  01/14/2021   • IR LOWER EXTREMITY ANGIOGRAM  11/28/2022   • IL SLCTV CATHJ 3RD+ ORD SLCTV ABDL PEL/LXTR [facility] Left 11/28/2022    Procedure: LEG AGRAM W/ INTERVENTION;  Surgeon: Jeremie Green MD;  Location: [facility];  Service: Vascular   • THROMBECTOMY W/ EMBOLECTOMY Right 01/13/2021    Procedure: EMBOLECTOMY/THROMBECTOMY LOWER EXTREMITY;  Surgeon: Yifan Daugherty DO;  [facility]: [facility];  Service: Vascular       Family History   Problem Relation Age of Onset   • Diabetes Mother    • Hypertension Father    • Leukemia Father    • Acute lymphoblastic leukemia Father    • Cancer Sister        Social History     Socioeconomic History   • Marital status:      Spouse name: Not on file   • Number of children: Not on file   • Years of education: Not on file   • Highest education level: Not on file   Occupational History   • Not on file   Tobacco Use   • Smoking status: Some Days     Packs/day: 0 25     Types: Cigarettes     Last attempt to quit: 2022     Years since quittin 0   • Smokeless tobacco: Never   Vaping Use   • Vaping Use: Never used   Substance and Sexual Activity   • Alcohol use: Yes     Comment: rare   • Drug use: Not Currently     Types: Heroin     Comment: clean since , now on methadone   • Sexual activity: Not on file   Other Topics Concern   • Not on file   Social History Narrative   • Not on file     Social Determinants of Health     Financial Resource Strain: Not on file   Food Insecurity: Not on file   Transportation Needs: Not on file   Physical Activity: Not on file   Stress: Not on file   Social Connections: Not on file   Intimate Partner Violence: Not on file   Housing Stability: Not on file       Allergies   Allergen Reactions   • Varenicline Rash         Current Outpatient Medications:   •  acetaminophen (TYLENOL) 325 mg tablet, Take 3 tablets (975 mg total) by mouth every 8 (eight) hours, Disp: , Rfl: 0  •  albuterol (PROVENTIL HFA,VENTOLIN HFA) 90 mcg/act inhaler, Inhale 2 puffs every 4 (four) hours as needed for wheezing, Disp: , Rfl: 0  •  aspirin 81 mg chewable tablet, Chew 81 mg daily, Disp: , Rfl:   •  atorvastatin (LIPITOR) 20 mg tablet, Take 1 tablet (20 mg total) by mouth daily, Disp: 90 tablet, Rfl: 3  •  bisacodyl (DULCOLAX) 5 mg EC tablet, Take as instructed by GI office for bowel prep for colonoscopy, Disp: 2 tablet, Rfl: 0  •  Blood Glucose Monitoring Suppl (OneTouch Verio) w/Device KIT, Use to test blood sugars 3 times daily, Disp: 1 kit, Rfl: 0  •  cephalexin (KEFLEX) 500 mg capsule, Take 1 capsule (500 mg total) by mouth every 12 (twelve) hours for 7 days, Disp: 14 capsule, Rfl: 0  •  cholecalciferol (VITAMIN D3) 1,000 units tablet, Take 2 tablets (2,000 Units total) by mouth daily, Disp: 12 tablet, Rfl: 0  •  Continuous Blood Gluc  (FreeStyle Ke 2 [place]) JOSEPH, Use as directed, Disp: 1 each, Rfl: 0  •  Continuous Blood Gluc Sensor (FreeStyle Ke 2 Sensor) MISC, Change every 14 days, Disp: 6 each, Rfl: 2  •  Diclofenac Sodium (VOLTAREN) 1 %, Apply 4 g topically 4 (four) times a day as needed (as needed for pain), Disp: 50 g, Rfl: 1  •  DULoxetine (CYMBALTA) 60 mg delayed release capsule, Take 1 capsule (60 mg total) by mouth daily at bedtime, Disp: 30 capsule, Rfl: 2  •  Eliquis 5 MG, Take 1 tablet by mouth twice daily, Disp: 180 tablet, Rfl: 0  •  famotidine (PEPCID) 20 mg tablet, Take 1 tablet (20 mg total) by mouth 2 (two) times a day, Disp: 30 tablet, Rfl: 8  •  fluticasone (FLONASE) 50 mcg/act nasal spray, 1 spray into each nostril daily, Disp: 16 g, Rfl: 2  •  glucose blood test strip, Use 1 each 3 (three) times daily after meals Use as instructed, Disp: 270 each, Rfl: 3  •  hydrOXYzine HCL (ATARAX) 25 mg tablet, Take 1 tablet (25 mg total) by mouth every 6 (six) hours as needed for anxiety, Disp: 30 tablet, Rfl: 0  •  insulin glargine (LANTUS) 100 units/mL subcutaneous injection, Inject 29 Units under the skin daily at bedtime, Disp: 10 mL, Rfl: 3  •  insulin lispro (HumaLOG KwikPen) 100 units/mL injection pen, Blood Glucose 150 - 224: 4 Unit of Insulin Blood Glucose 225 - 299: 5 Units of Insulin Blood Glucose 300 - 374: 6 Units of Insulin Blood Glucose 375 - 449: 7 Units of Insulin Blood Glucose greater than or equal to 450: 8 Units of Insulin Max of 24 units per day, Disp: 15 mL, Rfl: 3  •  Insulin Pen Needle 31G X 5 MM MISC, Use daily Pt to inject 4 X daily, Disp: 100 each, Rfl: 5  •  Insulin Pen Needle 31G X 5 MM MISC, 30 units sq 2X daily, Disp: 100 each, Rfl: 3  •  Insulin Syringe-Needle U-100 (BD Veo Insulin Syringe U/F) 31G X 15/64" 0 5 ML MISC, Inject under the skin 3 (three) times a day Use as directed, Disp: 300 each, Rfl: 5  •  Janumet  MG per tablet, TAKE 1 TABLET BY MOUTH TWICE DAILY WITH MEALS, Disp: 180 tablet, Rfl: 0  •  Jardiance 25 MG TABS, TAKE 1 TABLET BY MOUTH ONCE DAILY IN THE MORNING, Disp: 30 tablet, Rfl: 0  •  levocetirizine (XYZAL) 5 MG tablet, TAKE 1 TABLET BY MOUTH ONCE DAILY IN THE EVENING, Disp: 90 tablet, Rfl: 0  •  lidocaine (LMX) 4 % cream, Apply topically 4 (four) times a day as needed for mild pain, Disp: 30 g, Rfl: 0  •  lisinopril (ZESTRIL) 10 mg tablet, Take 1 tablet by mouth once daily, Disp: 90 tablet, Rfl: 0  •  magnesium oxide (MAG-OX) 400 mg, Take 1 tablet (400 mg total) by mouth daily, Disp: 90 tablet, Rfl: 1  •  menthol-methyl salicylate (BENGAY) 54-46 % cream, Apply topically 4 (four) times a day as needed (torticollis), Disp: , Rfl: 0  •  methadone (DOLOPHINE) 10 mg tablet, Take 70 mg by mouth daily,, Disp: , Rfl:   •  multivitamin-minerals (CENTRUM ADULTS) tablet, Take 1 tablet by mouth daily, Disp: , Rfl: 0  •  naloxone (NARCAN) 4 mg/0 1 mL nasal spray, Administer 1 spray into a nostril   If no response after 2-3 minutes, give another dose in the other nostril using a new spray , Disp: 1 each, Rfl: 1  •  nicotine (Nicoderm CQ) 14 mg/24hr TD 24 hr patch, Place 1 patch on the skin every 24 hours, Disp: 28 patch, Rfl: 0  •  ondansetron (ZOFRAN-ODT) 4 mg disintegrating tablet, DISSOLVE 1 TABLET IN MOUTH EVERY 6 HOURS AS NEEDED FOR NAUSEA FOR VOMITING, Disp: 40 tablet, Rfl: 0  •  pantoprazole (PROTONIX) 40 mg tablet, Take 1 tablet by mouth once daily, Disp: 30 tablet, Rfl: 0  •  polyethylene glycol (GOLYTELY) 4000 mL solution, Take as instructed by GI office for bowel prep, Disp: 4000 mL, Rfl: 0  •  silver sulfadiazine (SILVADENE,SSD) 1 % cream, Apply topically daily Apply as directed to left heel ulcer daily with each dressing change , Disp: 25 g, Rfl: 2

## 2022-12-15 VITALS
OXYGEN SATURATION: 96 % | RESPIRATION RATE: 20 BRPM | SYSTOLIC BLOOD PRESSURE: 122 MMHG | TEMPERATURE: 98.4 F | DIASTOLIC BLOOD PRESSURE: 70 MMHG | HEART RATE: 78 BPM

## 2022-12-15 NOTE — PATIENT INSTRUCTIONS
Start Keflex 500 mg p o  twice daily x7 days  Postintervention LE arterial duplex with CHATO  Continue aspirin, Eliquis and statin  Continue local wound care with silver alginate and dry causing  Closely monitor for changes to wound, signs of worsening infection, redness, pain, drainage, fever or chills  Follow-up with podiatry as scheduled regarding plan for L great toe treatment

## 2022-12-15 NOTE — ASSESSMENT & PLAN NOTE
42-year-old male, former smoker with HTN, HLD, dm type 2, GERD, chronic hepatitis C, methadone use, COVID-19 pneumonia January 2021 c/b acute respiratory failure requiring intubation, fungal empyema,  acute nonocclusive R FV DVT and PE 1/12/21 and acute critical RLE limb ischemia 2/2 aortic thrombus, s/p R SFA/PFA/pop/tibial embolectomy by Dr Kristen Ye 1/13/21 followed by R guillotine amputation 1/14/21 and formalization of R AKA by Dr Pipe Estrella 1/18/21, PAD w/ L great toe wound now s/p L SFA PTA/stenting 11/28/22 Stanley Araya) present for post agram follow up  - patient is accompanied by nephew Idalia Arita  -Patient and nephew report patient was doing well with improvement to left great toe ulceration up until yesterday 12/13/2022  - Left great toe erythematous and edematous   -Patient was seen by podiatry yesterday recommending amputation, however this came as a shock to patient and nephew as it was their understanding they were going to try debridement and long-term antibiotics in an attempt to salvage the toe  Seeking second opinion next week 12/20/2022  -Foot MRI 11/4/2022 concerning for OM  -Palpable left AT with robust biphasic DP signal   Motor sensor intact  -L great toe ulceration clinical picture below    Plan:  -Given reported change of the toe we will start antibiotic course until patient is able to be seen by  new podiatrist for second opinion 12/20/2022  Keflex 500 mg p o  twice daily 7-day  -Post intervention LEAD  -Follow-up with podiatry as scheduled for plan regarding L great toe intervention  LLE blood flow is optimized from a vascular standpoint   -Continue ASA, Eliquis and statin  -Continue local wound care with silver alginate and dry gauze dressing   -Monitor for signs and symptoms of infection including worsening erythema, pain, purulent drainage, fever or chills    -cc Dr Pipe Estrella

## 2022-12-16 ENCOUNTER — TELEPHONE (OUTPATIENT)
Dept: FAMILY MEDICINE CLINIC | Facility: CLINIC | Age: 62
End: 2022-12-16

## 2022-12-16 DIAGNOSIS — E11.42 TYPE 2 DIABETES MELLITUS WITH DIABETIC POLYNEUROPATHY, WITH LONG-TERM CURRENT USE OF INSULIN (HCC): ICD-10-CM

## 2022-12-16 DIAGNOSIS — Z79.4 TYPE 2 DIABETES MELLITUS WITH DIABETIC POLYNEUROPATHY, WITH LONG-TERM CURRENT USE OF INSULIN (HCC): ICD-10-CM

## 2022-12-16 RX ORDER — EMPAGLIFLOZIN 25 MG/1
TABLET, FILM COATED ORAL
Qty: 30 TABLET | Refills: 0 | Status: SHIPPED | OUTPATIENT
Start: 2022-12-16

## 2022-12-16 NOTE — TELEPHONE ENCOUNTER
Patient's nephew called and wanted to know if he should take his other diabetic medication while taking his Lantus

## 2022-12-20 ENCOUNTER — OFFICE VISIT (OUTPATIENT)
Dept: WOUND CARE | Facility: CLINIC | Age: 62
End: 2022-12-20

## 2022-12-20 VITALS
HEART RATE: 72 BPM | SYSTOLIC BLOOD PRESSURE: 118 MMHG | DIASTOLIC BLOOD PRESSURE: 78 MMHG | TEMPERATURE: 97.4 F | RESPIRATION RATE: 18 BRPM

## 2022-12-20 DIAGNOSIS — L97.526 DIABETIC ULCER OF TOE OF LEFT FOOT ASSOCIATED WITH TYPE 2 DIABETES MELLITUS, WITH BONE INVOLVEMENT WITHOUT EVIDENCE OF NECROSIS (HCC): ICD-10-CM

## 2022-12-20 DIAGNOSIS — E11.621 DIABETIC ULCER OF TOE OF LEFT FOOT ASSOCIATED WITH TYPE 2 DIABETES MELLITUS, WITH BONE INVOLVEMENT WITHOUT EVIDENCE OF NECROSIS (HCC): ICD-10-CM

## 2022-12-20 DIAGNOSIS — M86.9 TOE OSTEOMYELITIS, LEFT (HCC): Primary | ICD-10-CM

## 2022-12-20 DIAGNOSIS — I73.9 PAD (PERIPHERAL ARTERY DISEASE) (HCC): ICD-10-CM

## 2022-12-20 NOTE — PROGRESS NOTES
Patient ID: Joaquin Lazar is a 57 y o  male Date of Birth 1960       Chief Complaint   Patient presents with   • Follow Up Wound Care Visit     Left great toe follow up       Allergies:  Varenicline    Diagnosis:  1  Toe osteomyelitis, left ([facility])  -     Wound cleansing and dressings; Future    2  Diabetic ulcer of toe of left foot associated with type 2 diabetes mellitus, with bone involvement without evidence of necrosis ([facility])  -     Wound cleansing and dressings; Future    3  PAD (peripheral artery disease) ([facility])  -     Wound cleansing and dressings; Future       Diagnosis ICD-10-CM Associated Orders   1  Toe osteomyelitis, left ([facility])  M86 9 Wound cleansing and dressings      2  Diabetic ulcer of toe of left foot associated with type 2 diabetes mellitus, with bone involvement without evidence of necrosis ([facility])  G44 921 Wound cleansing and dressings    [id number]       3  PAD (peripheral artery disease) ([facility])  I73 9 Wound cleansing and dressings           Assessment & Plan:  See wound orders  -Greater than 50% of this appointment was spent with review of records and counseling on patient prognosis and course of treatment  -I reviewed previous x-ray and MRI which was from November, we discussed at length that this is now over a month since he was diagnosed with a bone infection in his toe, the osteomyelitis has progressed    We discussed both oral versus IV antibiotic treatment and he has had oral treatment and he states that this area keeps coming back, they verbalized understanding that there are 2 forms of infection here soft tissue and bone, he is having recurrent soft tissue infection due to a continued bone infection   -He understands that he currently has a wound with healing following stent placement of his left lower extremity, he like to seek a third opinion from Dr Andrew Fountain   -I discussed that we could remove part of the hallux leaving the base intact, but unfortunately with the duration of this chronic osteomyelitis the base was probably infected as well, this would likely come back with a positive culture and he will need 6 weeks of IV antibiotics which would cause further issues with his kidney disease  -I discussed that I would recommend surgical cure of the osteomyelitis in the left hallux and he would benefit from a total toe amputation on the left  -He is return to clinic in further evaluation and hopeful surgical intervention, I discussed that the nature of this was nonelective and urgent, I discussed that having active osteomyelitis in his hallux could seed other areas of the body and could result in death    Subjective:   Patient presents for evaluation management of his left foot, he is seeking a second opinion for his left hallux wound  States that he did have revascularization and told that he has to have the entire digit amputated, he is worried about his change in ambulation, and further amputation and he would prefer to keep his left lower extremity intact as he is lost his right  Patient and nephew state that they were the under the impression that following revascularization local wound care will be achieved and hopefully the digit could be saved and wound healed        The following portions of the patient's history were reviewed and updated as appropriate:   Patient Active Problem List   Diagnosis   • Chronic hepatitis C without hepatic coma ([facility] )   • Essential hypertension   • Hyperlipidemia associated with type 2 diabetes mellitus ([facility] )   • Methadone maintenance therapy patient ([facility] )   • Type 2 diabetes mellitus with diabetic polyneuropathy ([facility] )   • Diabetic ulcer of toe of left foot associated with type 2 diabetes mellitus, with bone involvement without evidence of necrosis (HCC)   • Bilateral lower extremity edema   • Positive depression screening   • Mononeuropathy, unspecified   • Mixed hyperlipidemia   • Proteinuria   • Vitamin D deficiency   • Type 2 diabetes mellitus, with long-term current use of insulin (HCC)   • Acute respiratory failure with hypoxia (HCC)   • Hematuria   • Aortic thrombus (HCC)   • Prolonged Q-T interval on ECG   • Empyema lung (HCC)   • S/P AKA (above knee amputation), right (HCC)   • Cervical radiculopathy   • Chronic bilateral low back pain   • Above-knee amputation of right lower extremity (HCC)   • Drug-induced constipation   • Moderate protein-calorie malnutrition (HCC)   • Chronic pain syndrome   • Chronic hyponatremia   • Depression   • History of COVID-19   • Pulmonary fibrosis (HCC)   • Foot drop, left   • PAD (peripheral artery disease) (HCC)   • Diabetic neuropathy (HCC)     Past Medical History:   Diagnosis Date   • Arthritis    • Diabetes mellitus ([facility] )    • DVT (deep venous thrombosis) ([facility])    • GERD (gastroesophageal reflux disease)    • History of COVID-19 2020    severe disease, intubated/ICU x 1 month   • History of drug abuse ([facility] )     heroin abuse, clean since 2003   • Hypercholesteremia    • Hypertension    • Ischemia of right lower extremity with suspected rhabdomyolysis 02/06/2021   • Left Hydropneumothorax 02/10/2021   • Methadone use    • Pneumonia due to COVID-19 virus 01/11/2021   • Subacute osteomyelitis of right femur ([facility] ) 09/13/2021   • Vascular disorder of lower extremity      Past Surgical History:   Procedure Laterality Date   • AMPUTATION ABOVE KNEE (AKA) Right 01/14/2021    Procedure: AMPUTATION ABOVE KNEE (AKA);   Surgeon: Cesar Lopez MD;  [facility];  Service: Vascular   • AMPUTATION ABOVE KNEE (AKA) Right 01/18/2021    Procedure: AMPUTATION ABOVE KNEE (AKA) FORMALIZATION,  R AKA wound washout, wound closure;  Surgeon: Cesar Lopez MD;  [facility];  Service: Vascular   • ARTERIOGRAM Right 01/13/2021    Procedure: ARTERIOGRAM;  Surgeon: Sigrid Schilder, DO;  [facility];  Service: Vascular   • BRONCHOSCOPY     • IR CHEST TUBE PLACEMENT  02/10/2021   • IR LOWER EXTREMITY ANGIOGRAM  01/14/2021 • IR LOWER EXTREMITY ANGIOGRAM  2022   • MS SLCTV CATHJ 3RD+ ORD SLCTV ABDL PEL/LXTR [facility] Left 2022    Procedure: LEG AGRAM W/ INTERVENTION;  Surgeon: Paula Villafana MD;  Location: [facility];  Service: Vascular   • THROMBECTOMY W/ EMBOLECTOMY Right 2021    Procedure: EMBOLECTOMY/THROMBECTOMY LOWER EXTREMITY;  Surgeon: Binu Jensen DO;  [facility];  Service: Vascular     Social History     Socioeconomic History   • Marital status:      Spouse name: None   • Number of children: None   • Years of education: None   • Highest education level: None   Occupational History   • None   Tobacco Use   • Smoking status: Some Days     Packs/day: 0 25     Types: Cigarettes     Last attempt to quit: 2022     Years since quittin 0   • Smokeless tobacco: Never   Vaping Use   • Vaping Use: Never used   Substance and Sexual Activity   • Alcohol use: Yes     Comment: rare   • Drug use: Not Currently     Types: Heroin     Comment: clean since , now on methadone   • Sexual activity: None   Other Topics Concern   • None   Social History Narrative   • None     Social Determinants of Health     Financial Resource Strain: Not on file   Food Insecurity: Not on file   Transportation Needs: Not on file   Physical Activity: Not on file   Stress: Not on file   Social Connections: Not on file   Intimate Partner Violence: Not on file   Housing Stability: Not on file        Current Outpatient Medications:   •  acetaminophen (TYLENOL) 325 mg tablet, Take 3 tablets (975 mg total) by mouth every 8 (eight) hours, Disp: , Rfl: 0  •  albuterol (PROVENTIL HFA,VENTOLIN HFA) 90 mcg/act inhaler, Inhale 2 puffs every 4 (four) hours as needed for wheezing, Disp: , Rfl: 0  •  aspirin 81 mg chewable tablet, Chew 81 mg daily, Disp: , Rfl:   •  atorvastatin (LIPITOR) 20 mg tablet, Take 1 tablet (20 mg total) by mouth daily, Disp: 90 tablet, Rfl: 3  •  bisacodyl (DULCOLAX) 5 mg EC tablet, Take as instructed by GI office for bowel prep for colonoscopy, Disp: 2 tablet, Rfl: 0  •  Blood Glucose Monitoring Suppl (OneTouch Verio) w/Device KIT, Use to test blood sugars 3 times daily, Disp: 1 kit, Rfl: 0  •  cephalexin (KEFLEX) 500 mg capsule, Take 1 capsule (500 mg total) by mouth every 12 (twelve) hours for 7 days, Disp: 14 capsule, Rfl: 0  •  cholecalciferol (VITAMIN D3) 1,000 units tablet, Take 2 tablets (2,000 Units total) by mouth daily, Disp: 12 tablet, Rfl: 0  •  Continuous Blood Gluc  (FreeStyle Ke 2 [place]) JOSEPH, Use as directed, Disp: 1 each, Rfl: 0  •  Continuous Blood Gluc Sensor (FreeStyle Ke 2 Sensor) MISC, Change every 14 days, Disp: 6 each, Rfl: 2  •  Diclofenac Sodium (VOLTAREN) 1 %, Apply 4 g topically 4 (four) times a day as needed (as needed for pain), Disp: 50 g, Rfl: 1  •  DULoxetine (CYMBALTA) 60 mg delayed release capsule, Take 1 capsule (60 mg total) by mouth daily at bedtime, Disp: 30 capsule, Rfl: 2  •  Eliquis 5 MG, Take 1 tablet by mouth twice daily, Disp: 180 tablet, Rfl: 0  •  famotidine (PEPCID) 20 mg tablet, Take 1 tablet (20 mg total) by mouth 2 (two) times a day, Disp: 30 tablet, Rfl: 8  •  fluticasone (FLONASE) 50 mcg/act nasal spray, 1 spray into each nostril daily, Disp: 16 g, Rfl: 2  •  glucose blood test strip, Use 1 each 3 (three) times daily after meals Use as instructed, Disp: 270 each, Rfl: 3  •  hydrOXYzine HCL (ATARAX) 25 mg tablet, Take 1 tablet (25 mg total) by mouth every 6 (six) hours as needed for anxiety, Disp: 30 tablet, Rfl: 0  •  insulin glargine (LANTUS) 100 units/mL subcutaneous injection, Inject 29 Units under the skin daily at bedtime, Disp: 10 mL, Rfl: 3  •  insulin lispro (HumaLOG KwikPen) 100 units/mL injection pen, Blood Glucose 150 - 224: 4 Unit of Insulin Blood Glucose 225 - 299: 5 Units of Insulin Blood Glucose 300 - 374: 6 Units of Insulin Blood Glucose 375 - 449: 7 Units of Insulin Blood Glucose greater than or equal to 450: 8 Units of Insulin Max of 24 units per day, Disp: 15 mL, Rfl: 3  •  Insulin Pen Needle 31G X 5 MM MISC, Use daily Pt to inject 4 X daily, Disp: 100 each, Rfl: 5  •  Insulin Pen Needle 31G X 5 MM MISC, 30 units sq 2X daily, Disp: 100 each, Rfl: 3  •  Insulin Syringe-Needle U-100 (BD Veo Insulin Syringe U/F) 31G X 15/64" 0 5 ML MISC, Inject under the skin 3 (three) times a day Use as directed, Disp: 300 each, Rfl: 5  •  Janumet  MG per tablet, TAKE 1 TABLET BY MOUTH TWICE DAILY WITH MEALS, Disp: 180 tablet, Rfl: 0  •  Jardiance 25 MG TABS, TAKE 1 TABLET BY MOUTH ONCE DAILY IN THE MORNING, Disp: 30 tablet, Rfl: 0  •  levocetirizine (XYZAL) 5 MG tablet, TAKE 1 TABLET BY MOUTH ONCE DAILY IN THE EVENING, Disp: 90 tablet, Rfl: 0  •  lidocaine (LMX) 4 % cream, Apply topically 4 (four) times a day as needed for mild pain, Disp: 30 g, Rfl: 0  •  lisinopril (ZESTRIL) 10 mg tablet, Take 1 tablet by mouth once daily, Disp: 90 tablet, Rfl: 0  •  magnesium oxide (MAG-OX) 400 mg, Take 1 tablet (400 mg total) by mouth daily, Disp: 90 tablet, Rfl: 1  •  menthol-methyl salicylate (BENGAY) 52-17 % cream, Apply topically 4 (four) times a day as needed (torticollis), Disp: , Rfl: 0  •  methadone (DOLOPHINE) 10 mg tablet, Take 70 mg by mouth daily,, Disp: , Rfl:   •  multivitamin-minerals (CENTRUM ADULTS) tablet, Take 1 tablet by mouth daily, Disp: , Rfl: 0  •  naloxone (NARCAN) 4 mg/0 1 mL nasal spray, Administer 1 spray into a nostril   If no response after 2-3 minutes, give another dose in the other nostril using a new spray , Disp: 1 each, Rfl: 1  •  nicotine (Nicoderm CQ) 14 mg/24hr TD 24 hr patch, Place 1 patch on the skin every 24 hours, Disp: 28 patch, Rfl: 0  •  ondansetron (ZOFRAN-ODT) 4 mg disintegrating tablet, DISSOLVE 1 TABLET IN MOUTH EVERY 6 HOURS AS NEEDED FOR NAUSEA FOR VOMITING, Disp: 40 tablet, Rfl: 0  •  pantoprazole (PROTONIX) 40 mg tablet, Take 1 tablet by mouth once daily, Disp: 30 tablet, Rfl: 0  •  polyethylene glycol (GOLYTELY) 4000 mL solution, Take as instructed by GI office for bowel prep, Disp: 4000 mL, Rfl: 0  •  silver sulfadiazine (SILVADENE,SSD) 1 % cream, Apply topically daily Apply as directed to left heel ulcer daily with each dressing change , Disp: 25 g, Rfl: 2  Family History   Problem Relation Age of Onset   • Diabetes Mother    • Hypertension Father    • Leukemia Father    • Acute lymphoblastic leukemia Father    • Cancer Sister       Review of Systems   Constitutional: Negative for chills and fever  HENT: Negative for ear pain and sore throat  Eyes: Negative for pain and visual disturbance  Respiratory: Negative for cough and shortness of breath  Cardiovascular: Negative for chest pain and palpitations  Gastrointestinal: Negative for abdominal pain and vomiting  Genitourinary: Negative for dysuria and hematuria  Musculoskeletal: Negative for arthralgias and back pain  Skin: Negative for color change and rash  Neurological: Negative for seizures and syncope  All other systems reviewed and are negative  Objective:  /78   Pulse 72   Temp (!) 97 4 °F (36 3 °C)   Resp 18     Physical Exam  Vitals reviewed  Constitutional:       Appearance: Normal appearance  HENT:      Head: Normocephalic and atraumatic  Nose: Nose normal       Mouth/Throat:      Mouth: Mucous membranes are moist       Pharynx: Oropharynx is clear  Eyes:      Pupils: Pupils are equal, round, and reactive to light  Pulmonary:      Effort: Pulmonary effort is normal    Musculoskeletal:         General: Swelling and deformity present  No tenderness  Comments: Tenderness of patient on the left hallux, the digit is swollen, erythematous and there is a ulceration on the dorsal aspect of left H IPJ with granular base and appearing stable  Minimal range of motion of the left IPJ very arthritic in nature    Proximal amputation on the right    Skin:     Capillary Refill: Capillary refill takes 2 to 3 seconds  Neurological:      General: No focal deficit present  Mental Status: He is alert and oriented to person, place, and time  Mental status is at baseline  Wound 10/25/22 Diabetic Ulcer Toe (Comment  which one) Anterior; Left (Active)   Wound Image   12/20/22 1530   Wound Description Pink;Yellow; Exposed bone 12/20/22 1533   Renuka-wound Assessment Pink; Maceration 12/20/22 1533   Wound Length (cm) 0 7 cm 12/20/22 1533   Wound Width (cm) 0 5 cm 12/20/22 1533   Wound Depth (cm) 0 1 cm 12/20/22 1533   Wound Surface Area (cm^2) 0 35 cm^2 12/20/22 1533   Wound Volume (cm^3) 0 035 cm^3 12/20/22 1533   Calculated Wound Volume (cm^3) 0 04 cm^3 12/20/22 1533   Change in Wound Size % 42 86 12/20/22 1533   Drainage Amount Moderate 12/20/22 1533   Drainage Description Serous 12/20/22 1533   Non-staged Wound Description Full thickness 12/20/22 1533   Treatments Cleansed 12/20/22 1533   Patient Tolerance Tolerated well 12/13/22 1510                         Procedures             Wound Instructions:  Orders Placed This Encounter   Procedures   • Wound cleansing and dressings     Wound cleansing and dressings       Left great toe      Wash your hands with soap and water  Remove old dressing, discard into plastic bag and place in trash  Cleanse the ulcer with normal saline prior to applying a clean dressing  Do not use tissue or cotton balls  Do not scrub the ulceer  Pat dry using gauze       Shower yes DO NOT GET DRESSING WET       Apply silver alginate to left great toe ulcer   Cover with gauze  Secure with kenya and tape  Change dressing three times per week and as needed for excessive drainage or leakage       Monitor for signs and symptons of infection such as  Redness,  Swelling,  Increased drainage, or malodor go to be emergency room to be evaluated  Elevate lower leg on pillow so no pressure is on your heel       Talk to Dr Gaona Given at your appointment about toe amputation    Dr Maximiliano Hull will perform this if you want    Follow up in two weeks     Standing Status:   Future     Standing Expiration Date:   12/20/2023         Jian Do, DPM      Portions of the record may have been created with voice recognition software  Occasional wrong word or "sound a like" substitutions may have occurred due to the inherent limitations of voice recognition software  Read the chart carefully and recognize, using context, where substitutions have occurred

## 2022-12-20 NOTE — PATIENT INSTRUCTIONS
Orders Placed This Encounter   Procedures    Wound cleansing and dressings     Wound cleansing and dressings       Left great toe      Wash your hands with soap and water  Remove old dressing, discard into plastic bag and place in trash  Cleanse the ulcer with normal saline prior to applying a clean dressing  Do not use tissue or cotton balls  Do not scrub the ulceer  Pat dry using gauze  Shower yes DO NOT GET DRESSING WET  Apply silver alginate to left great toe ulcer  Cover with gauze  Secure with kenya and tape  Change dressing three times per week and as needed for excessive drainage or leakage  Monitor for signs and symptons of infection such as  Redness,  Swelling,  Increased drainage, or malodor go to be emergency room to be evaluated  Elevate lower leg on pillow so no pressure is on your heel  Talk to Dr Madonna Boland at your appointment about toe amputation    Dr Mychal Trejo will perform this if you want    Follow up in two weeks     Standing Status:   Future     Standing Expiration Date:   12/20/2023

## 2022-12-21 ENCOUNTER — HOME CARE VISIT (OUTPATIENT)
Dept: HOME HEALTH SERVICES | Facility: HOME HEALTHCARE | Age: 62
End: 2022-12-21

## 2022-12-21 VITALS
DIASTOLIC BLOOD PRESSURE: 82 MMHG | HEART RATE: 97 BPM | OXYGEN SATURATION: 99 % | SYSTOLIC BLOOD PRESSURE: 114 MMHG | RESPIRATION RATE: 18 BRPM | TEMPERATURE: 97.1 F

## 2022-12-22 DIAGNOSIS — K21.9 GASTROESOPHAGEAL REFLUX DISEASE, UNSPECIFIED WHETHER ESOPHAGITIS PRESENT: ICD-10-CM

## 2022-12-22 RX ORDER — PANTOPRAZOLE SODIUM 40 MG/1
TABLET, DELAYED RELEASE ORAL
Qty: 30 TABLET | Refills: 0 | Status: SHIPPED | OUTPATIENT
Start: 2022-12-22

## 2022-12-28 ENCOUNTER — TELEPHONE (OUTPATIENT)
Dept: VASCULAR SURGERY | Facility: CLINIC | Age: 62
End: 2022-12-28

## 2022-12-28 ENCOUNTER — HOME CARE VISIT (OUTPATIENT)
Dept: HOME HEALTH SERVICES | Facility: HOME HEALTHCARE | Age: 62
End: 2022-12-28

## 2022-12-28 VITALS
OXYGEN SATURATION: 97 % | HEART RATE: 94 BPM | SYSTOLIC BLOOD PRESSURE: 110 MMHG | DIASTOLIC BLOOD PRESSURE: 78 MMHG | RESPIRATION RATE: 18 BRPM | TEMPERATURE: 97 F

## 2022-12-28 NOTE — TELEPHONE ENCOUNTER
Pt seen by Lelia Urbano on 12/14/22 s/p L SFA PTA/stenting 11/28/22  Per OV note from Lelia Urbano, "Left great toe erythematous and edematous   -Patient was seen by podiatry yesterday recommending amputation, however this came as a shock to patient and nephew as it was their understanding they were going to try debridement and long-term antibiotics in an attempt to salvage the toe  Seeking second opinion next week 12/20/2022  -Foot MRI 11/4/2022 concerning for OM  -Palpable left AT with robust biphasic DP signal   Motor sensor intact     Plan:  -Given reported change of the toe we will start antibiotic course until patient is able to be seen by  new podiatrist for second opinion 12/20/2022  Keflex 500 mg p o  twice daily 7-day  -Post intervention LEAD  -Follow-up with podiatry as scheduled for plan regarding L great toe intervention  LLE blood flow is optimized from a vascular standpoint "    Pt did see Dr Conchita Ojeda on 12/20 and he also recommended toe amputation  Pt and nephew wish to get Dr Josue Scott professional opinion before proceeding w/ any type of amputation  Pt does have OV w/ Dr Vilma Ann on 1/25 and has been placed on the cancellation list by scheduling  Pt's nephew requested Dr Vilma Ann to call him when he gets a chance to get his opinion (does not have to be today)  Informed him we would send a message to Dr Vilma Hedrick' call back number is [phone]

## 2022-12-29 NOTE — TELEPHONE ENCOUNTER
Rach Nelson MD  You; [facility]; [facility] 13 hours ago (10:23 PM)     SS  Let me look at arterial doppler on 1/3/22, after that please setup telephone visit with me to discuss findings and further plan  Thanks

## 2023-01-03 ENCOUNTER — HOSPITAL ENCOUNTER (OUTPATIENT)
Dept: NON INVASIVE DIAGNOSTICS | Facility: HOSPITAL | Age: 63
Discharge: HOME | End: 2023-01-03

## 2023-01-03 ENCOUNTER — OFFICE VISIT (OUTPATIENT)
Dept: WOUND CARE | Facility: CLINIC | Age: 63
End: 2023-01-03

## 2023-01-03 VITALS
TEMPERATURE: 98.4 F | SYSTOLIC BLOOD PRESSURE: 118 MMHG | HEART RATE: 88 BPM | DIASTOLIC BLOOD PRESSURE: 62 MMHG | RESPIRATION RATE: 18 BRPM

## 2023-01-03 DIAGNOSIS — L97.526 DIABETIC ULCER OF TOE OF LEFT FOOT ASSOCIATED WITH TYPE 2 DIABETES MELLITUS, WITH BONE INVOLVEMENT WITHOUT EVIDENCE OF NECROSIS (HCC): ICD-10-CM

## 2023-01-03 DIAGNOSIS — E11.621 DIABETIC ULCER OF TOE OF LEFT FOOT ASSOCIATED WITH TYPE 2 DIABETES MELLITUS, WITH BONE INVOLVEMENT WITHOUT EVIDENCE OF NECROSIS (HCC): ICD-10-CM

## 2023-01-03 DIAGNOSIS — I70.299 ATHEROSCLEROSIS OF ARTERY OF EXTREMITY WITH ULCERATION (HCC): ICD-10-CM

## 2023-01-03 DIAGNOSIS — M86.9 TOE OSTEOMYELITIS, LEFT (HCC): Primary | ICD-10-CM

## 2023-01-03 DIAGNOSIS — L97.909 ATHEROSCLEROSIS OF ARTERY OF EXTREMITY WITH ULCERATION (HCC): ICD-10-CM

## 2023-01-03 RX ORDER — CHLORHEXIDINE GLUCONATE 0.12 MG/ML
15 RINSE ORAL ONCE
OUTPATIENT
Start: 2023-01-03 | End: 2023-01-03

## 2023-01-03 RX ORDER — CEFAZOLIN SODIUM 2 G/50ML
2000 SOLUTION INTRAVENOUS ONCE
OUTPATIENT
Start: 2023-01-03 | End: 2023-01-03

## 2023-01-03 NOTE — PATIENT INSTRUCTIONS
Orders Placed This Encounter   Procedures    Wound cleansing and dressings     Wound cleansing and dressings                                  Left great toe      Wash your hands with soap and water  Remove old dressing, discard into plastic bag and place in trash  Cleanse the ulcer with normal saline prior to applying a clean dressing  Do not use tissue or cotton balls  Do not scrub the ulceer  Pat dry using gauze  Shower yes DO NOT GET DRESSING WET  Apply silver alginate to left great toe ulcer  Cover with gauze  Secure with kenya and tape  Change dressing three times per week and as needed for excessive drainage or leakage  Monitor for signs and symptons of infection such as  Redness,  Swelling,  Increased drainage, or malodor go to be emergency room to be evaluated  Elevate lower leg on pillow so no pressure is on your heel  Dr BEVERLY [facility] surgical coordinator will call you about a time for your surgery in the next few weeks    Follow after your surgery    Please call and schedule an appointment     Standing Status:   Future     Standing Expiration Date:   1/3/2024

## 2023-01-03 NOTE — PROGRESS NOTES
Assessment/Plan:    No problem-specific Assessment & Plan notes found for this encounter  Diagnoses and all orders for this visit:    Toe osteomyelitis, left ([address] )  -     Wound cleansing and dressings; Future  -     Case request operating room: AMPUTATION TOE- hallux; Standing  -     Case request operating room: AMPUTATION TOE- hallux    Diabetic ulcer of toe of left foot associated with type 2 diabetes mellitus, with bone involvement without evidence of necrosis (HCC)  -     Wound cleansing and dressings; Future    Other orders  -     Nursing Communication Warmimg Interventions Implemented; Standing  -     Nursing Communication CHG bath, have staff wash entire body (neck down) per pre-op bathing protocol  Routine, evening prior to, and day of surgery ; Standing  -     Nursing Communication Swab both nares with Povidone-Iodine solution, EXCLUDE if patient has shellfish/Iodine allergy  Routine, day of surgery, on call to OR; Standing  -     chlorhexidine (PERIDEX) 0 12 % oral rinse 15 mL  -     Void on call to OR; Standing  -     Insert peripheral IV; Standing  -     Diet NPO; Sips with meds; Standing  -     ceFAZolin (ANCEF) IVPB (premix in dextrose) 2,000 mg 50 mL      -Course of treatment options for treatment were reviewed with the patient, I continue to recommend left hallux amputation  --Patient would like to discuss this course of treatment with Dr Britney Torres, and he will be having a telehealth visit with him on 1/4   -We will go ahead and prepare him for hopeful digital amputation which will likely be occurring on 1/20  - If his circulation is below healing potential with his repeat postintervention arterial studies, we will cancel the surgery until blood flow is restored to the extremity  -MRI was again reviewed with the patient from 11/4    As we are now nearly 2 months status post this initial MRI, it is highly likely that his infection has continued to progress and does involve the base of the proximal phalanx, at this time I would recommend a total hallux amputation for surgical cure  - I discussed his postoperative course will be weightbearing as tolerated in surgical shoe with removal of stitches between 2 and 4 weeks he will need postoperative antibiotics for soft tissue coverage for the following 5 to 7 days following toe amputation     Subjective:      Patient ID: Lucía George is a 57 y o  male  Patient presents for evaluation and management of his left big toe  Has been keeping this area covered as directed  He understands he does have a bone infection of his left big toe  He was seen by me for second opinion, and he will also be seeing vascular tomorrow for a telehealth visit to discuss his repeat Dopplers   Course of treatment for the left big toe  The following portions of the patient's history were reviewed and updated as appropriate: allergies, current medications, past family history, past medical history, past social history, past surgical history and problem list     Review of Systems   Constitutional: Negative for chills and fever  HENT: Negative for ear pain and sore throat  Eyes: Negative for pain and visual disturbance  Respiratory: Negative for cough and shortness of breath  Cardiovascular: Negative for chest pain and palpitations  Gastrointestinal: Negative for abdominal pain and vomiting  Genitourinary: Negative for dysuria and hematuria  Musculoskeletal: Negative for arthralgias and back pain  Skin: Negative for color change and rash  Neurological: Negative for seizures and syncope  All other systems reviewed and are negative  Objective:      /62   Pulse 88   Temp 98 4 °F (36 9 °C)   Resp 18          Physical Exam  Vitals reviewed  Constitutional:       Appearance: Normal appearance  HENT:      Head: Normocephalic and atraumatic        Nose: Nose normal       Mouth/Throat:      Mouth: Mucous membranes are moist    Eyes:      Pupils: Pupils are equal, round, and reactive to light  Pulmonary:      Effort: Pulmonary effort is normal    Musculoskeletal:         General: Swelling present  Comments: Ulceration on the dorsal aspect of the left H IPJ with exposed bone  No malodor, moderate drainage, minimal sausage appearance of the toe  Skin:     Capillary Refill: Capillary refill takes 2 to 3 seconds  Neurological:      General: No focal deficit present  Mental Status: He is alert and oriented to person, place, and time  Mental status is at baseline

## 2023-01-04 ENCOUNTER — TELEPHONE (OUTPATIENT)
Dept: GASTROENTEROLOGY | Facility: CLINIC | Age: 63
End: 2023-01-04

## 2023-01-04 ENCOUNTER — TELEMEDICINE (OUTPATIENT)
Dept: VASCULAR SURGERY | Facility: CLINIC | Age: 63
End: 2023-01-04

## 2023-01-04 ENCOUNTER — PREP FOR PROCEDURE (OUTPATIENT)
Dept: OBGYN CLINIC | Facility: CLINIC | Age: 63
End: 2023-01-04

## 2023-01-04 VITALS — BODY MASS INDEX: 27.37 KG/M2 | HEIGHT: 68 IN

## 2023-01-04 DIAGNOSIS — I73.9 PAD (PERIPHERAL ARTERY DISEASE) (HCC): Primary | ICD-10-CM

## 2023-01-04 RX ORDER — LANOLIN ALCOHOL/MO/W.PET/CERES
400 CREAM (GRAM) TOPICAL DAILY
COMMUNITY
Start: 2022-12-27

## 2023-01-04 NOTE — ASSESSMENT & PLAN NOTE
S/p left SFA angioplasty and stent with significant improvement in the toe perfusion and CHATO which is now normal   He is optimized for left first toe amputation  Ok to hold Eliquis for 24 hrs prior to procedure to avoid chance of thrombosis  Recent doppler imaging reviewed  Stent is patent  We will obtain repeat doppler in 3 months

## 2023-01-04 NOTE — PROGRESS NOTES
Virtual Brief Visit    Patient is located in the following state in which I hold an active license [place]      Assessment/Plan:    Problem List Items Addressed This Visit        Cardiovascular and Mediastinum    PAD (peripheral artery disease) ([facility] [address] ) - Primary     S/p left SFA angioplasty and stent with significant improvement in the toe perfusion and CHATO which is now normal   He is optimized for left first toe amputation  Ok to hold Eliquis for 24 hrs prior to procedure to avoid chance of thrombosis  Recent doppler imaging reviewed  Stent is patent  We will obtain repeat doppler in 3 months  Ideally dont hold eliquis prior to procedure  I have discussed with Dr Kristan Claire  Recent Visits  Date Type Provider Dept   12/28/22 Telephone Servando Aranda RN [facility]   Showing recent visits within past 7 days and meeting all other requirements  Today's Visits  Date Type Provider Dept   01/04/23 Telemedicine Melyssa Espinal MD [facility] [address] today's visits and meeting all other requirements  Future Appointments  No visits were found meeting these conditions  Showing future appointments within next 150 days and meeting all other requirements     Patient and nephew on phone today to discuss further plan  Left great toe ulcer getting worse and has bone osteomyelitis and plan for toe amp surgery by Dr Kristan Claire on Jan 20  He is on eliquis  Foot feels much better after angioplasty stent procedure done in November          Visit Time    Visit Start Time: 1000  Visit Stop Time: 9511  Total Visit Duration: 20 minutes

## 2023-01-04 NOTE — PATIENT INSTRUCTIONS
S/p left SFA angioplasty and stent with significant improvement in the toe perfusion and CHATO which is now normal     He is optimized for left first toe amputation  Ok to hold Eliquis for 24 hrs prior to procedure to avoid chance of thrombosis  Ideally dont hold eliquis prior to procedure  I have discussed with Dr Vikram Cleveland  Recent doppler imaging reviewed  Stent is patent  We will obtain repeat doppler in 3 months

## 2023-01-04 NOTE — TELEPHONE ENCOUNTER
Patient's nephew cancelled upcomming procedure stating patient has upcomming surgery on toe due to infection in bone  Patients nephew will call back to reschedule

## 2023-01-05 ENCOUNTER — HOME CARE VISIT (OUTPATIENT)
Dept: HOME HEALTH SERVICES | Facility: HOME HEALTHCARE | Age: 63
End: 2023-01-05

## 2023-01-05 VITALS
DIASTOLIC BLOOD PRESSURE: 84 MMHG | SYSTOLIC BLOOD PRESSURE: 120 MMHG | RESPIRATION RATE: 18 BRPM | OXYGEN SATURATION: 93 % | TEMPERATURE: 96.9 F | HEART RATE: 92 BPM

## 2023-01-05 DIAGNOSIS — Z79.4 TYPE 2 DIABETES MELLITUS WITH DIABETIC POLYNEUROPATHY, WITH LONG-TERM CURRENT USE OF INSULIN (HCC): ICD-10-CM

## 2023-01-05 DIAGNOSIS — E11.42 TYPE 2 DIABETES MELLITUS WITH DIABETIC POLYNEUROPATHY, WITH LONG-TERM CURRENT USE OF INSULIN (HCC): ICD-10-CM

## 2023-01-05 RX ORDER — EMPAGLIFLOZIN 25 MG/1
TABLET, FILM COATED ORAL
Qty: 30 TABLET | Refills: 0 | Status: SHIPPED | OUTPATIENT
Start: 2023-01-05

## 2023-01-10 ENCOUNTER — HOME CARE VISIT (OUTPATIENT)
Dept: HOME HEALTH SERVICES | Facility: HOME HEALTHCARE | Age: 63
End: 2023-01-10

## 2023-01-10 VITALS
OXYGEN SATURATION: 97 % | HEART RATE: 80 BPM | TEMPERATURE: 97.3 F | DIASTOLIC BLOOD PRESSURE: 90 MMHG | RESPIRATION RATE: 18 BRPM | SYSTOLIC BLOOD PRESSURE: 128 MMHG

## 2023-01-12 ENCOUNTER — CONSULT (OUTPATIENT)
Dept: FAMILY MEDICINE CLINIC | Facility: CLINIC | Age: 63
End: 2023-01-12

## 2023-01-12 VITALS
BODY MASS INDEX: 26.22 KG/M2 | OXYGEN SATURATION: 97 % | WEIGHT: 173 LBS | TEMPERATURE: 97.6 F | SYSTOLIC BLOOD PRESSURE: 112 MMHG | HEART RATE: 95 BPM | HEIGHT: 68 IN | DIASTOLIC BLOOD PRESSURE: 60 MMHG

## 2023-01-12 DIAGNOSIS — L08.9 FOOT INFECTION: ICD-10-CM

## 2023-01-12 DIAGNOSIS — Z01.818 PREOP EXAMINATION: Primary | ICD-10-CM

## 2023-01-12 DIAGNOSIS — Z23 ENCOUNTER FOR IMMUNIZATION: ICD-10-CM

## 2023-01-12 DIAGNOSIS — F17.200 TOBACCO DEPENDENCE: ICD-10-CM

## 2023-01-12 LAB — COLOGUARD RESULT REPORTABLE: NORMAL

## 2023-01-12 RX ORDER — NICOTINE 21 MG/24HR
1 PATCH, TRANSDERMAL 24 HOURS TRANSDERMAL EVERY 24 HOURS
Qty: 28 PATCH | Refills: 0 | Status: SHIPPED | OUTPATIENT
Start: 2023-01-12

## 2023-01-12 NOTE — PROGRESS NOTES
Name: Claus Ruiz      : 1960      MRN: [id number]  Encounter Provider: VERONICA Means  Encounter Date: 2023   Encounter department: [address] [place] [address]  Preop examination    2  Foot infection    3  Encounter for immunization  Comments:  declines pneumonia     4  Tobacco dependence  -     nicotine (NICODERM CQ) 21 mg/24 hr TD 24 hr patch; Place 1 patch on the skin every 24 hours         Subjective      Procedure date: 2023    Surgeon:  Dr Ruiz Pittman   Planned procedure: Toe amputation   Diagnosis for procedure: Toe osteomyelitis     Prior anesthesia: Yes   General; Complications:  None / Tolerated well    CAD History: None   NOTE: Patient should see Cardiology if time available before surgery, and if appropriate  Pulmonary History: PAD, aortic thrombosis, HTN     Renal history: CKD stage 2 - GFR 60-89  Last GFR 73, Cr 1 06     Diabetes History:  Type 2  Uncontrolled- is to follow up with endocrinology due to difficulty controlling and multiple complications  Was told at last visit but has not made an appointment yet  Follow up with endocrinology in the next 1 month  Wants to see Clara Clarke- call for appointment  Neurological History: None     On Immunosuppressant meds/biologics: No    Preop labs/testing available and reviewed: yes  RCRI  High Risk surgery? 1 Point  CAD History:         1 Point   MI; Positive Stress Test; CP due to Mi;  Nitrate Usage to control Angina;  Pathologic Q wave on EKG  CHF Active:         1 Point   Pulm Edema; Paroxysmal Nocturnal Dyspnea;  Bibasilar Rales (crackles);S3; CHF on CXR  Cerebrovascular Disease (TIA or CVA):     1 Point  DM on Insulin:        1 Point  Serum Creat >2 0 mg/dl:       1 Point          Total Points: 0     Scorin: Class I, Very Low Risk (0 4%)     1: Class II, Low risk (0 9%)     2: Class III Moderate (6 6%)     3: Class IV High (>11%)    Low risk for cardiac incident during proposed procedure  Has been smoking more than 10 cigs a day and feels like he he needs a higher dose patch  Review of Systems   Constitutional: Negative for activity change, appetite change, chills, fever and unexpected weight change  HENT: Negative for ear pain and sore throat  Eyes: Negative for pain and visual disturbance  Respiratory: Negative for cough and shortness of breath  Cardiovascular: Negative for chest pain and palpitations  Gastrointestinal: Negative for abdominal pain, constipation, diarrhea and vomiting  Genitourinary: Negative for dysuria and hematuria  Musculoskeletal: Negative for arthralgias and back pain  Skin: Positive for wound  Negative for color change and rash  Neurological: Negative for dizziness, seizures, syncope and headaches  All other systems reviewed and are negative        Current Outpatient Medications on File Prior to Visit   Medication Sig   • acetaminophen (TYLENOL) 325 mg tablet Take 3 tablets (975 mg total) by mouth every 8 (eight) hours   • albuterol (PROVENTIL HFA,VENTOLIN HFA) 90 mcg/act inhaler Inhale 2 puffs every 4 (four) hours as needed for wheezing   • aspirin 81 mg chewable tablet Chew 81 mg daily   • atorvastatin (LIPITOR) 20 mg tablet Take 1 tablet (20 mg total) by mouth daily   • Blood Glucose Monitoring Suppl (OneTouch Verio) w/Device KIT Use to test blood sugars 3 times daily   • cholecalciferol (VITAMIN D3) 1,000 units tablet Take 2 tablets (2,000 Units total) by mouth daily   • Continuous Blood Gluc  (FreeStyle No 2 [place]) JOSEPH Use as directed   • Continuous Blood Gluc Sensor (FreeStyle Ke 2 Sensor) MISC Change every 14 days   • Diclofenac Sodium (VOLTAREN) 1 % Apply 4 g topically 4 (four) times a day as needed (as needed for pain)   • DULoxetine (CYMBALTA) 60 mg delayed release capsule Take 1 capsule (60 mg total) by mouth daily at bedtime   • Eliquis 5 MG Take 1 tablet by mouth twice daily   • famotidine (PEPCID) 20 mg tablet Take 1 tablet (20 mg total) by mouth 2 (two) times a day   • fluticasone (FLONASE) 50 mcg/act nasal spray 1 spray into each nostril daily (Patient taking differently: 1 spray into each nostril if needed)   • glucose blood test strip Use 1 each 3 (three) times daily after meals Use as instructed   • hydrOXYzine HCL (ATARAX) 25 mg tablet Take 1 tablet (25 mg total) by mouth every 6 (six) hours as needed for anxiety   • insulin glargine (LANTUS) 100 units/mL subcutaneous injection Inject 29 Units under the skin daily at bedtime   • insulin lispro (HumaLOG KwikPen) 100 units/mL injection pen Blood Glucose 150 - 224: 4 Unit of Insulin Blood Glucose 225 - 299: 5 Units of Insulin Blood Glucose 300 - 374: 6 Units of Insulin Blood Glucose 375 - 449: 7 Units of Insulin Blood Glucose greater than or equal to 450: 8 Units of Insulin Max of 24 units per day   • Insulin Pen Needle 31G X 5 MM MISC Use daily Pt to inject 4 X daily (Patient taking differently: Use Check sugars three times a day)   • Insulin Pen Needle 31G X 5 MM MISC 30 units sq 2X daily   • Insulin Syringe-Needle U-100 (BD Veo Insulin Syringe U/F) 31G X 15/64" 0 5 ML MISC Inject under the skin 3 (three) times a day Use as directed (Patient taking differently: Inject under the skin 3 (three) times a day Check sugars three times a day)   • Janumet  MG per tablet TAKE 1 TABLET BY MOUTH TWICE DAILY WITH MEALS   • Jardiance 25 MG TABS TAKE 1 TABLET BY MOUTH ONCE DAILY IN THE MORNING   • levocetirizine (XYZAL) 5 MG tablet TAKE 1 TABLET BY MOUTH ONCE DAILY IN THE EVENING   • lidocaine (LMX) 4 % cream Apply topically 4 (four) times a day as needed for mild pain   • lisinopril (ZESTRIL) 10 mg tablet Take 1 tablet by mouth once daily   • magnesium oxide (MAG-OX) 400 mg Take 1 tablet (400 mg total) by mouth daily   • menthol-methyl salicylate (BENGAY) 47-34 % cream Apply topically 4 (four) times a day as needed (torticollis)   • methadone (DOLOPHINE) 10 mg tablet Take 70 mg by mouth daily,   • multivitamin-minerals (CENTRUM ADULTS) tablet Take 1 tablet by mouth daily   • naloxone (NARCAN) 4 mg/0 1 mL nasal spray Administer 1 spray into a nostril  If no response after 2-3 minutes, give another dose in the other nostril using a new spray  • ondansetron (ZOFRAN-ODT) 4 mg disintegrating tablet DISSOLVE 1 TABLET IN MOUTH EVERY 6 HOURS AS NEEDED FOR NAUSEA FOR VOMITING   • pantoprazole (PROTONIX) 40 mg tablet Take 1 tablet by mouth once daily   • silver sulfadiazine (SILVADENE,SSD) 1 % cream Apply topically daily Apply as directed to left heel ulcer daily with each dressing change  • [DISCONTINUED] nicotine (Nicoderm CQ) 14 mg/24hr TD 24 hr patch Place 1 patch on the skin every 24 hours   • bisacodyl (DULCOLAX) 5 mg EC tablet Take as instructed by GI office for bowel prep for colonoscopy (Patient not taking: Reported on 1/12/2023)   • magnesium Oxide (MAG-OX) 400 mg TABS Take 400 mg by mouth daily (Patient not taking: Reported on 1/12/2023)   • polyethylene glycol (GOLYTELY) 4000 mL solution Take as instructed by GI office for bowel prep (Patient not taking: Reported on 1/12/2023)       Objective     /60   Pulse 95   Temp 97 6 °F (36 4 °C) (Tympanic)   Ht 5' 8" (1 727 m)   Wt 78 5 kg (173 lb)   SpO2 97%   BMI 26 30 kg/m²     Physical Exam  Vitals and nursing note reviewed  Constitutional:       General: He is not in acute distress  Appearance: Normal appearance  He is normal weight  He is not ill-appearing or toxic-appearing  Cardiovascular:      Rate and Rhythm: Normal rate and regular rhythm  Pulses: Normal pulses  Heart sounds: Normal heart sounds  No murmur heard  No friction rub  No gallop  Pulmonary:      Effort: Pulmonary effort is normal       Breath sounds: Normal breath sounds  No wheezing, rhonchi or rales  Abdominal:      General: Abdomen is flat   Bowel sounds are normal       Palpations: Abdomen is soft       Tenderness: There is no abdominal tenderness  There is no guarding  Hernia: No hernia is present  Musculoskeletal:      Comments: AKA right    Skin:     Capillary Refill: Capillary refill takes less than 2 seconds  Neurological:      General: No focal deficit present  Mental Status: He is alert and oriented to person, place, and time  Mental status is at baseline  Motor: No weakness  Coordination: Coordination normal       Gait: Gait normal    Psychiatric:         Mood and Affect: Mood normal          Behavior: Behavior normal          Thought Content:  Thought content normal          Judgment: Judgment normal        VERONICA Sanchez

## 2023-01-12 NOTE — H&P (VIEW-ONLY)
Name: Joaquin Lazar      : 1960      MRN: [id number]  Encounter Provider: VERONICA Ovalles  Encounter Date: 2023   Encounter department: [address] [place] [address]     1  Preop examination    2  Foot infection    3  Encounter for immunization  Comments:  declines pneumonia     4  Tobacco dependence  -     nicotine (NICODERM CQ) 21 mg/24 hr TD 24 hr patch; Place 1 patch on the skin every 24 hours         Subjective      Procedure date: 2023    Surgeon:  Dr Maximiliano Hull   Planned procedure: Toe amputation   Diagnosis for procedure: Toe osteomyelitis     Prior anesthesia: Yes   General; Complications:  None / Tolerated well    CAD History: None   NOTE: Patient should see Cardiology if time available before surgery, and if appropriate  Pulmonary History: PAD, aortic thrombosis, HTN     Renal history: CKD stage 2 - GFR 60-89  Last GFR 73, Cr 1 06     Diabetes History:  Type 2  Uncontrolled- is to follow up with endocrinology due to difficulty controlling and multiple complications  Was told at last visit but has not made an appointment yet  Follow up with endocrinology in the next 1 month  Wants to see Tony Conklin- call for appointment  Neurological History: None     On Immunosuppressant meds/biologics: No    Preop labs/testing available and reviewed: yes  RCRI  High Risk surgery? 1 Point  CAD History:         1 Point   MI; Positive Stress Test; CP due to Mi;  Nitrate Usage to control Angina;  Pathologic Q wave on EKG  CHF Active:         1 Point   Pulm Edema; Paroxysmal Nocturnal Dyspnea;  Bibasilar Rales (crackles);S3; CHF on CXR  Cerebrovascular Disease (TIA or CVA):     1 Point  DM on Insulin:        1 Point  Serum Creat >2 0 mg/dl:       1 Point          Total Points: 0     Scorin: Class I, Very Low Risk (0 4%)     1: Class II, Low risk (0 9%)     2: Class III Moderate (6 6%)     3: Class IV High (>11%)    Low risk for cardiac incident during proposed procedure  Has been smoking more than 10 cigs a day and feels like he he needs a higher dose patch  Review of Systems   Constitutional: Negative for activity change, appetite change, chills, fever and unexpected weight change  HENT: Negative for ear pain and sore throat  Eyes: Negative for pain and visual disturbance  Respiratory: Negative for cough and shortness of breath  Cardiovascular: Negative for chest pain and palpitations  Gastrointestinal: Negative for abdominal pain, constipation, diarrhea and vomiting  Genitourinary: Negative for dysuria and hematuria  Musculoskeletal: Negative for arthralgias and back pain  Skin: Positive for wound  Negative for color change and rash  Neurological: Negative for dizziness, seizures, syncope and headaches  All other systems reviewed and are negative        Current Outpatient Medications on File Prior to Visit   Medication Sig   • acetaminophen (TYLENOL) 325 mg tablet Take 3 tablets (975 mg total) by mouth every 8 (eight) hours   • albuterol (PROVENTIL HFA,VENTOLIN HFA) 90 mcg/act inhaler Inhale 2 puffs every 4 (four) hours as needed for wheezing   • aspirin 81 mg chewable tablet Chew 81 mg daily   • atorvastatin (LIPITOR) 20 mg tablet Take 1 tablet (20 mg total) by mouth daily   • Blood Glucose Monitoring Suppl (OneTouch Verio) w/Device KIT Use to test blood sugars 3 times daily   • cholecalciferol (VITAMIN D3) 1,000 units tablet Take 2 tablets (2,000 Units total) by mouth daily   • Continuous Blood Gluc  (FreeStyle No 2 [place]) JOSEPH Use as directed   • Continuous Blood Gluc Sensor (FreeStyle Ke 2 Sensor) MISC Change every 14 days   • Diclofenac Sodium (VOLTAREN) 1 % Apply 4 g topically 4 (four) times a day as needed (as needed for pain)   • DULoxetine (CYMBALTA) 60 mg delayed release capsule Take 1 capsule (60 mg total) by mouth daily at bedtime   • Eliquis 5 MG Take 1 tablet by mouth twice daily   • famotidine (PEPCID) 20 mg tablet Take 1 tablet (20 mg total) by mouth 2 (two) times a day   • fluticasone (FLONASE) 50 mcg/act nasal spray 1 spray into each nostril daily (Patient taking differently: 1 spray into each nostril if needed)   • glucose blood test strip Use 1 each 3 (three) times daily after meals Use as instructed   • hydrOXYzine HCL (ATARAX) 25 mg tablet Take 1 tablet (25 mg total) by mouth every 6 (six) hours as needed for anxiety   • insulin glargine (LANTUS) 100 units/mL subcutaneous injection Inject 29 Units under the skin daily at bedtime   • insulin lispro (HumaLOG KwikPen) 100 units/mL injection pen Blood Glucose 150 - 224: 4 Unit of Insulin Blood Glucose 225 - 299: 5 Units of Insulin Blood Glucose 300 - 374: 6 Units of Insulin Blood Glucose 375 - 449: 7 Units of Insulin Blood Glucose greater than or equal to 450: 8 Units of Insulin Max of 24 units per day   • Insulin Pen Needle 31G X 5 MM MISC Use daily Pt to inject 4 X daily (Patient taking differently: Use Check sugars three times a day)   • Insulin Pen Needle 31G X 5 MM MISC 30 units sq 2X daily   • Insulin Syringe-Needle U-100 (BD Veo Insulin Syringe U/F) 31G X 15/64" 0 5 ML MISC Inject under the skin 3 (three) times a day Use as directed (Patient taking differently: Inject under the skin 3 (three) times a day Check sugars three times a day)   • Janumet  MG per tablet TAKE 1 TABLET BY MOUTH TWICE DAILY WITH MEALS   • Jardiance 25 MG TABS TAKE 1 TABLET BY MOUTH ONCE DAILY IN THE MORNING   • levocetirizine (XYZAL) 5 MG tablet TAKE 1 TABLET BY MOUTH ONCE DAILY IN THE EVENING   • lidocaine (LMX) 4 % cream Apply topically 4 (four) times a day as needed for mild pain   • lisinopril (ZESTRIL) 10 mg tablet Take 1 tablet by mouth once daily   • magnesium oxide (MAG-OX) 400 mg Take 1 tablet (400 mg total) by mouth daily   • menthol-methyl salicylate (BENGAY) 85-53 % cream Apply topically 4 (four) times a day as needed (torticollis)   • methadone (DOLOPHINE) 10 mg tablet Take 70 mg by mouth daily,   • multivitamin-minerals (CENTRUM ADULTS) tablet Take 1 tablet by mouth daily   • naloxone (NARCAN) 4 mg/0 1 mL nasal spray Administer 1 spray into a nostril  If no response after 2-3 minutes, give another dose in the other nostril using a new spray  • ondansetron (ZOFRAN-ODT) 4 mg disintegrating tablet DISSOLVE 1 TABLET IN MOUTH EVERY 6 HOURS AS NEEDED FOR NAUSEA FOR VOMITING   • pantoprazole (PROTONIX) 40 mg tablet Take 1 tablet by mouth once daily   • silver sulfadiazine (SILVADENE,SSD) 1 % cream Apply topically daily Apply as directed to left heel ulcer daily with each dressing change  • [DISCONTINUED] nicotine (Nicoderm CQ) 14 mg/24hr TD 24 hr patch Place 1 patch on the skin every 24 hours   • bisacodyl (DULCOLAX) 5 mg EC tablet Take as instructed by GI office for bowel prep for colonoscopy (Patient not taking: Reported on 1/12/2023)   • magnesium Oxide (MAG-OX) 400 mg TABS Take 400 mg by mouth daily (Patient not taking: Reported on 1/12/2023)   • polyethylene glycol (GOLYTELY) 4000 mL solution Take as instructed by GI office for bowel prep (Patient not taking: Reported on 1/12/2023)       Objective     /60   Pulse 95   Temp 97 6 °F (36 4 °C) (Tympanic)   Ht 5' 8" (1 727 m)   Wt 78 5 kg (173 lb)   SpO2 97%   BMI 26 30 kg/m²     Physical Exam  Vitals and nursing note reviewed  Constitutional:       General: He is not in acute distress  Appearance: Normal appearance  He is normal weight  He is not ill-appearing or toxic-appearing  Cardiovascular:      Rate and Rhythm: Normal rate and regular rhythm  Pulses: Normal pulses  Heart sounds: Normal heart sounds  No murmur heard  No friction rub  No gallop  Pulmonary:      Effort: Pulmonary effort is normal       Breath sounds: Normal breath sounds  No wheezing, rhonchi or rales  Abdominal:      General: Abdomen is flat   Bowel sounds are normal       Palpations: Abdomen is soft       Tenderness: There is no abdominal tenderness  There is no guarding  Hernia: No hernia is present  Musculoskeletal:      Comments: AKA right    Skin:     Capillary Refill: Capillary refill takes less than 2 seconds  Neurological:      General: No focal deficit present  Mental Status: He is alert and oriented to person, place, and time  Mental status is at baseline  Motor: No weakness  Coordination: Coordination normal       Gait: Gait normal    Psychiatric:         Mood and Affect: Mood normal          Behavior: Behavior normal          Thought Content:  Thought content normal          Judgment: Judgment normal        VERONICA [place]

## 2023-01-16 ENCOUNTER — ANESTHESIA EVENT (OUTPATIENT)
Dept: PERIOP | Facility: HOSPITAL | Age: 63
End: 2023-01-16

## 2023-01-16 NOTE — PRE-PROCEDURE INSTRUCTIONS
Pre-Surgery Instructions:   Medication Instructions   • acetaminophen (TYLENOL) 325 mg tablet Uses PRN- OK to take day of surgery   • albuterol (PROVENTIL HFA,VENTOLIN HFA) 90 mcg/act inhaler Uses PRN- OK to take day of surgery   • aspirin 81 mg chewable tablet Instructions provided by MD   • atorvastatin (LIPITOR) 20 mg tablet Take night before surgery   • cholecalciferol (VITAMIN D3) 1,000 units tablet Stop taking 7 days prior to surgery  • Diclofenac Sodium (VOLTAREN) 1 % Hold day of surgery  • DULoxetine (CYMBALTA) 60 mg delayed release capsule Take night before surgery   • Eliquis 5 MG Instructions provided by MD   • famotidine (PEPCID) 20 mg tablet Take day of surgery  • fluticasone (FLONASE) 50 mcg/act nasal spray Uses PRN- OK to take day of surgery   • hydrOXYzine HCL (ATARAX) 25 mg tablet Uses PRN- OK to take day of surgery   • insulin glargine (LANTUS) 100 units/mL subcutaneous injection Take day of surgery  • insulin lispro (HumaLOG KwikPen) 100 units/mL injection pen Hold day of surgery  • Janumet  MG per tablet Hold day of surgery  • Jardiance 25 MG TABS Hold day of surgery  • levocetirizine (XYZAL) 5 MG tablet Take night before surgery   • lidocaine (LMX) 4 % cream Hold day of surgery  • lisinopril (ZESTRIL) 10 mg tablet Hold day of surgery  • magnesium oxide (MAG-OX) 400 mg Stop taking 7 days prior to surgery  • menthol-methyl salicylate (BENGAY) 52-23 % cream Hold day of surgery  • methadone (DOLOPHINE) 10 mg tablet Take day of surgery  • multivitamin-minerals (CENTRUM ADULTS) tablet Stop taking 7 days prior to surgery  • nicotine (NICODERM CQ) 21 mg/24 hr TD 24 hr patch Hold day of surgery  • ondansetron (ZOFRAN-ODT) 4 mg disintegrating tablet Uses PRN- OK to take day of surgery   • pantoprazole (PROTONIX) 40 mg tablet Take day of surgery  • silver sulfadiazine (SILVADENE,SSD) 1 % cream Hold day of surgery      Spoke with pt nephew per nephew Pt denies fever, sob, sore throat and cough  Pt nephew verbalized understanding of shower and med instructions  Last dose of eliquis is 1/18  Pt to continue aspirin prior to surgery  Pt instructed to stop motrin, aleve, advil and vitamins one week prior to surgery

## 2023-01-18 ENCOUNTER — HOME CARE VISIT (OUTPATIENT)
Dept: HOME HEALTH SERVICES | Facility: HOME HEALTHCARE | Age: 63
End: 2023-01-18

## 2023-01-18 VITALS
OXYGEN SATURATION: 96 % | HEART RATE: 98 BPM | SYSTOLIC BLOOD PRESSURE: 108 MMHG | DIASTOLIC BLOOD PRESSURE: 72 MMHG | TEMPERATURE: 96.5 F

## 2023-01-20 ENCOUNTER — HOSPITAL ENCOUNTER (OUTPATIENT)
Facility: HOSPITAL | Age: 63
Setting detail: OUTPATIENT SURGERY
Discharge: HOME | End: 2023-01-20
Attending: PODIATRIST | Admitting: PODIATRIST

## 2023-01-20 ENCOUNTER — ANESTHESIA (OUTPATIENT)
Dept: PERIOP | Facility: HOSPITAL | Age: 63
End: 2023-01-20

## 2023-01-20 VITALS
HEIGHT: 68 IN | WEIGHT: 173 LBS | BODY MASS INDEX: 26.22 KG/M2 | HEART RATE: 85 BPM | RESPIRATION RATE: 16 BRPM | SYSTOLIC BLOOD PRESSURE: 122 MMHG | DIASTOLIC BLOOD PRESSURE: 64 MMHG | OXYGEN SATURATION: 98 % | TEMPERATURE: 98.2 F

## 2023-01-20 DIAGNOSIS — M86.9 TOE OSTEOMYELITIS, LEFT (HCC): ICD-10-CM

## 2023-01-20 DIAGNOSIS — Z91.89 AT RISK FOR INFECTION: ICD-10-CM

## 2023-01-20 DIAGNOSIS — G89.18 ACUTE POST-OPERATIVE PAIN: Primary | ICD-10-CM

## 2023-01-20 LAB
GLUCOSE SERPL-MCNC: 157 MG/DL (ref 65–140)
GLUCOSE SERPL-MCNC: 183 MG/DL (ref 65–140)

## 2023-01-20 RX ORDER — MEPERIDINE HYDROCHLORIDE 50 MG/ML
12.5 INJECTION INTRAMUSCULAR; INTRAVENOUS; SUBCUTANEOUS
Status: DISCONTINUED | OUTPATIENT
Start: 2023-01-20 | End: 2023-01-20 | Stop reason: HOSPADM

## 2023-01-20 RX ORDER — CHLORHEXIDINE GLUCONATE 0.12 MG/ML
15 RINSE ORAL ONCE
Status: COMPLETED | OUTPATIENT
Start: 2023-01-20 | End: 2023-01-20

## 2023-01-20 RX ORDER — CEPHALEXIN 500 MG/1
500 CAPSULE ORAL EVERY 6 HOURS SCHEDULED
Qty: 28 CAPSULE | Refills: 0 | Status: SHIPPED | OUTPATIENT
Start: 2023-01-20 | End: 2023-01-27

## 2023-01-20 RX ORDER — PROMETHAZINE HYDROCHLORIDE 25 MG/ML
12.5 INJECTION, SOLUTION INTRAMUSCULAR; INTRAVENOUS ONCE AS NEEDED
Status: DISCONTINUED | OUTPATIENT
Start: 2023-01-20 | End: 2023-01-20 | Stop reason: HOSPADM

## 2023-01-20 RX ORDER — PROPOFOL 10 MG/ML
INJECTION, EMULSION INTRAVENOUS CONTINUOUS PRN
Status: DISCONTINUED | OUTPATIENT
Start: 2023-01-20 | End: 2023-01-20

## 2023-01-20 RX ORDER — MAGNESIUM HYDROXIDE 1200 MG/15ML
LIQUID ORAL AS NEEDED
Status: DISCONTINUED | OUTPATIENT
Start: 2023-01-20 | End: 2023-01-20 | Stop reason: HOSPADM

## 2023-01-20 RX ORDER — HYDROMORPHONE HCL/PF 1 MG/ML
0.5 SYRINGE (ML) INJECTION
Status: DISCONTINUED | OUTPATIENT
Start: 2023-01-20 | End: 2023-01-20 | Stop reason: HOSPADM

## 2023-01-20 RX ORDER — FENTANYL CITRATE/PF 50 MCG/ML
25 SYRINGE (ML) INJECTION
Status: DISCONTINUED | OUTPATIENT
Start: 2023-01-20 | End: 2023-01-20 | Stop reason: HOSPADM

## 2023-01-20 RX ORDER — OXYCODONE HYDROCHLORIDE 5 MG/1
5 TABLET ORAL EVERY 4 HOURS PRN
Status: DISCONTINUED | OUTPATIENT
Start: 2023-01-20 | End: 2023-01-20 | Stop reason: HOSPADM

## 2023-01-20 RX ORDER — ACETAMINOPHEN 325 MG/1
650 TABLET ORAL EVERY 4 HOURS PRN
Status: DISCONTINUED | OUTPATIENT
Start: 2023-01-20 | End: 2023-01-20 | Stop reason: HOSPADM

## 2023-01-20 RX ORDER — SODIUM CHLORIDE, SODIUM LACTATE, POTASSIUM CHLORIDE, CALCIUM CHLORIDE 600; 310; 30; 20 MG/100ML; MG/100ML; MG/100ML; MG/100ML
125 INJECTION, SOLUTION INTRAVENOUS CONTINUOUS
Status: DISCONTINUED | OUTPATIENT
Start: 2023-01-20 | End: 2023-01-20

## 2023-01-20 RX ORDER — MIDAZOLAM HYDROCHLORIDE 2 MG/2ML
INJECTION, SOLUTION INTRAMUSCULAR; INTRAVENOUS AS NEEDED
Status: DISCONTINUED | OUTPATIENT
Start: 2023-01-20 | End: 2023-01-20

## 2023-01-20 RX ORDER — ONDANSETRON 2 MG/ML
4 INJECTION INTRAMUSCULAR; INTRAVENOUS EVERY 6 HOURS PRN
Status: DISCONTINUED | OUTPATIENT
Start: 2023-01-20 | End: 2023-01-20 | Stop reason: HOSPADM

## 2023-01-20 RX ORDER — CEFAZOLIN SODIUM 2 G/50ML
2000 SOLUTION INTRAVENOUS ONCE
Status: COMPLETED | OUTPATIENT
Start: 2023-01-20 | End: 2023-01-20

## 2023-01-20 RX ORDER — PROPOFOL 10 MG/ML
INJECTION, EMULSION INTRAVENOUS AS NEEDED
Status: DISCONTINUED | OUTPATIENT
Start: 2023-01-20 | End: 2023-01-20

## 2023-01-20 RX ORDER — ONDANSETRON 2 MG/ML
4 INJECTION INTRAMUSCULAR; INTRAVENOUS ONCE AS NEEDED
Status: DISCONTINUED | OUTPATIENT
Start: 2023-01-20 | End: 2023-01-20 | Stop reason: HOSPADM

## 2023-01-20 RX ORDER — ALBUTEROL SULFATE 2.5 MG/3ML
2.5 SOLUTION RESPIRATORY (INHALATION) ONCE AS NEEDED
Status: DISCONTINUED | OUTPATIENT
Start: 2023-01-20 | End: 2023-01-20 | Stop reason: HOSPADM

## 2023-01-20 RX ADMIN — CEFAZOLIN SODIUM 2000 MG: 2 SOLUTION INTRAVENOUS at 10:24

## 2023-01-20 RX ADMIN — PROPOFOL 100 MCG/KG/MIN: 10 INJECTION, EMULSION INTRAVENOUS at 10:31

## 2023-01-20 RX ADMIN — CHLORHEXIDINE GLUCONATE 0.12% ORAL RINSE 15 ML: 1.2 LIQUID ORAL at 10:17

## 2023-01-20 RX ADMIN — SODIUM CHLORIDE, SODIUM LACTATE, POTASSIUM CHLORIDE, AND CALCIUM CHLORIDE 125 ML/HR: .6; .31; .03; .02 INJECTION, SOLUTION INTRAVENOUS at 10:17

## 2023-01-20 RX ADMIN — MIDAZOLAM 2 MG: 1 INJECTION INTRAMUSCULAR; INTRAVENOUS at 10:25

## 2023-01-20 RX ADMIN — PROPOFOL 30 MG: 10 INJECTION, EMULSION INTRAVENOUS at 10:31

## 2023-01-20 NOTE — PROGRESS NOTES
Pts nephew assists with care;  brought into room and he will dress pt  Reviewed discharge instructions with both pt and nephew  No pain, pt states he has neuropathy and Tylenol/Methadone will help with pain

## 2023-01-20 NOTE — INTERVAL H&P NOTE
H&P reviewed  After examining the patient I find no changes in the patients condition since the H&P had been written      Vitals:    01/20/23 0942   BP: 156/76   Pulse: 98   Resp: 16   Temp: (!) 97 2 °F (36 2 °C)   SpO2: 96%

## 2023-01-20 NOTE — DISCHARGE INSTR - AVS FIRST PAGE
Dr Solitario Bio Instructions    1  Take your prescribed medication as directed  2  Upon arrival at home, lie down and elevate your surgical foot on 2 pillows  3  Remain quiet, off your feet as much as possible, for the first 24-48 hours  This is when your feet first swell and may become painful  After 48 hours you may begin limited walking following these restrictions:   Weightbear as tolerated to surgical foot with surgical shoe on at all times  4  Drink large quantities of water and citrus fruit juice  Consume no alcohol  Continue a well-balanced diet  5  Report any unusual discomfort or fever to this office  6  A limited amount of discomfort and swelling is to be expected  In some cases the skin may take on a bruised appearance  The surgical solution that was applied to your foot prior to the operation is dark in color and the operation site may appear to be oozing when it actually is not  7  A slight amount of blood is to be expected, and is no cause for alarm  Do not remove the dressings  If there is active bleeding and if the bleeding persists, add additional gauze to the bandage, apply direct pressure, elevate your feet and call this office  8  Do not get the dressings wet  As regular bathing may be inconvenient, sponge baths are recommended  9  When anesthesia wears off and if any discomfort should be present, apply an ice pack directly over the operated area for 15 minute intervals for several hours or until the pain leaves  (USE IN EXCESS OF 15 MINUTES COULD CAUSE FROSTBITE)  Do not use hot water bags or electric pads  A convenient icepack can be made by placing ice cubes in a plastic bag and covering this with a towel  10  If necessary, take a mild laxative before retiring  11  Wear your special open shoes anytime you put weight on your foot, even if it is just to walk to the bathroom and back  It will probably be 2 or 3 weeks before you will be permitted to try regular shoes    12  Having performed the operation, we are interested in a prompt recovery  Please cooperate by following the above instructions  13  Please call to confirm your post-op appointment or call with any other questions    14   Please take your Keflex as prescribed

## 2023-01-20 NOTE — DISCHARGE SUMMARY
Discharge Summary Outpatient Procedure Podiatry -   Romero Course 57 y o  male MRN: [id number]  Unit/Bed#: OR POOL Encounter: [id number]    Admission Date: 1/20/2023     Admitting Diagnosis: Toe osteomyelitis, left [facility]) [M86 9]    Discharge Diagnosis: same    Procedures Performed: AMPUTATION TOE- hallux: 99406 (CPT®)    Complications: none    Condition at Discharge: stable    Discharge instructions/Information to patient and family:   See after visit summary for information provided to patient and family  Provisions for Follow-Up Care/Important appointments:  See after visit summary for information related to follow-up care and any pertinent home health orders  Discharge Medications:  See after visit summary for reconciled discharge medications provided to patient and family

## 2023-01-20 NOTE — ANESTHESIA PREPROCEDURE EVALUATION
Procedure:  AMPUTATION TOE- hallux (Left: Toe)    Relevant Problems   CARDIO   (+) Aortic thrombus (HCC)   (+) Essential hypertension   (+) Hyperlipidemia associated with type 2 diabetes mellitus (HCC)   (+) Mixed hyperlipidemia      ENDO   (+) Type 2 diabetes mellitus with diabetic polyneuropathy (HCC)   (+) Type 2 diabetes mellitus, with long-term current use of insulin (HCC)      GI/HEPATIC   (+) Chronic hepatitis C without hepatic coma (HCC)      MUSCULOSKELETAL   (+) Chronic bilateral low back pain      NEURO/PSYCH   (+) Chronic bilateral low back pain   (+) Chronic pain syndrome   (+) Depression   (+) Diabetic neuropathy (HCC)   (+) History of COVID-19      PULMONARY   (+) Acute respiratory failure with hypoxia (HCC)        Physical Exam    Airway    Mallampati score: I  TM Distance: >3 FB  Neck ROM: full     Dental       Cardiovascular  Cardiovascular exam normal    Pulmonary  Pulmonary exam normal     Other Findings        Anesthesia Plan  ASA Score- 3     Anesthesia Type- IV sedation with anesthesia with ASA Monitors  Additional Monitors:   Airway Plan:           Plan Factors-Exercise tolerance (METS): >4 METS  Chart reviewed  EKG reviewed  Imaging results reviewed  Existing labs reviewed  Patient summary reviewed  Obstructive sleep apnea risk education given perioperatively  Induction- intravenous  Postoperative Plan- Plan for postoperative opioid use  Planned trial extubation    Informed Consent- Anesthetic plan and risks discussed with patient  I personally reviewed this patient with the CRNA  Discussed and agreed on the Anesthesia Plan with the CRNA  Roanna Schlatter

## 2023-01-20 NOTE — ANESTHESIA POSTPROCEDURE EVALUATION
Post-Op Assessment Note    CV Status:  Stable  Pain Score: 0    Pain management: adequate     Mental Status:  Sleepy   Hydration Status:  Stable   PONV Controlled:  None   Airway Patency:  Patent      Post Op Vitals Reviewed: Yes      Staff: CRNA         No notable events documented      BP   143/70   Temp  98 2   Pulse  81   Resp   15   SpO2   143/70

## 2023-01-23 ENCOUNTER — HOME CARE VISIT (OUTPATIENT)
Dept: HOME HEALTH SERVICES | Facility: HOME HEALTHCARE | Age: 63
End: 2023-01-23

## 2023-01-23 VITALS
OXYGEN SATURATION: 96 % | RESPIRATION RATE: 20 BRPM | DIASTOLIC BLOOD PRESSURE: 78 MMHG | TEMPERATURE: 98.2 F | HEART RATE: 90 BPM | SYSTOLIC BLOOD PRESSURE: 134 MMHG

## 2023-01-25 ENCOUNTER — TELEPHONE (OUTPATIENT)
Dept: GASTROENTEROLOGY | Facility: CLINIC | Age: 63
End: 2023-01-25

## 2023-01-26 ENCOUNTER — TELEPHONE (OUTPATIENT)
Dept: PAIN MEDICINE | Facility: CLINIC | Age: 63
End: 2023-01-26

## 2023-01-26 DIAGNOSIS — R11.11 VOMITING WITHOUT NAUSEA, UNSPECIFIED VOMITING TYPE: ICD-10-CM

## 2023-01-26 RX ORDER — ONDANSETRON 4 MG/1
TABLET, ORALLY DISINTEGRATING ORAL
Qty: 40 TABLET | Refills: 0 | Status: CANCELLED | OUTPATIENT
Start: 2023-01-26

## 2023-01-26 NOTE — TELEPHONE ENCOUNTER
Caller: Nolan Last    Doctor: Perico Briggs    Reason for call: pt is sick and will reschedule at another time    Call back#:[phone]

## 2023-01-27 ENCOUNTER — APPOINTMENT (OUTPATIENT)
Dept: LAB | Facility: CLINIC | Age: 63
End: 2023-01-27

## 2023-01-27 ENCOUNTER — OFFICE VISIT (OUTPATIENT)
Dept: PODIATRY | Facility: CLINIC | Age: 63
End: 2023-01-27

## 2023-01-27 VITALS — BODY MASS INDEX: 26.3 KG/M2 | HEIGHT: 68 IN

## 2023-01-27 DIAGNOSIS — R74.8 ELEVATED ALKALINE PHOSPHATASE LEVEL: ICD-10-CM

## 2023-01-27 DIAGNOSIS — E11.65 TYPE 2 DIABETES MELLITUS WITH HYPERGLYCEMIA, WITH LONG-TERM CURRENT USE OF INSULIN (HCC): ICD-10-CM

## 2023-01-27 DIAGNOSIS — L53.9 DEPENDENT RUBOR: ICD-10-CM

## 2023-01-27 DIAGNOSIS — Z79.4 TYPE 2 DIABETES MELLITUS WITH HYPERGLYCEMIA, WITH LONG-TERM CURRENT USE OF INSULIN (HCC): ICD-10-CM

## 2023-01-27 DIAGNOSIS — Z89.412 HISTORY OF AMPUTATION OF LEFT GREAT TOE (HCC): ICD-10-CM

## 2023-01-27 DIAGNOSIS — E11.42 TYPE 2 DIABETES MELLITUS WITH DIABETIC POLYNEUROPATHY, WITH LONG-TERM CURRENT USE OF INSULIN (HCC): ICD-10-CM

## 2023-01-27 DIAGNOSIS — Z79.4 TYPE 2 DIABETES MELLITUS WITH DIABETIC POLYNEUROPATHY, WITH LONG-TERM CURRENT USE OF INSULIN (HCC): ICD-10-CM

## 2023-01-27 DIAGNOSIS — L03.032 CELLULITIS OF LEFT TOE: Primary | ICD-10-CM

## 2023-01-27 DIAGNOSIS — Z12.5 SCREENING FOR PROSTATE CANCER: ICD-10-CM

## 2023-01-27 DIAGNOSIS — B19.20 HEPATITIS C VIRUS INFECTION WITHOUT HEPATIC COMA, UNSPECIFIED CHRONICITY: ICD-10-CM

## 2023-01-27 DIAGNOSIS — R20.8 BURNING SENSATION: ICD-10-CM

## 2023-01-27 DIAGNOSIS — K21.9 GASTROESOPHAGEAL REFLUX DISEASE, UNSPECIFIED WHETHER ESOPHAGITIS PRESENT: ICD-10-CM

## 2023-01-27 LAB
ANION GAP SERPL CALCULATED.3IONS-SCNC: 4 MMOL/L (ref 4–13)
BACTERIA UR QL AUTO: ABNORMAL /HPF
BILIRUB UR QL STRIP: NEGATIVE
BUN SERPL-MCNC: 30 MG/DL (ref 5–25)
CALCIUM SERPL-MCNC: 9.7 MG/DL (ref 8.3–10.1)
CHLORIDE SERPL-SCNC: 101 MMOL/L (ref 96–108)
CLARITY UR: CLEAR
CO2 SERPL-SCNC: 30 MMOL/L (ref 21–32)
COLOR UR: ABNORMAL
CREAT SERPL-MCNC: 1.29 MG/DL (ref 0.6–1.3)
FERRITIN SERPL-MCNC: 92 NG/ML (ref 8–388)
GFR SERPL CREATININE-BSD FRML MDRD: 58 ML/MIN/1.73SQ M
GGT SERPL-CCNC: 44 U/L (ref 5–85)
GLUCOSE SERPL-MCNC: 172 MG/DL (ref 65–140)
GLUCOSE UR STRIP-MCNC: ABNORMAL MG/DL
HGB UR QL STRIP.AUTO: NEGATIVE
HYALINE CASTS #/AREA URNS LPF: ABNORMAL /LPF
IRON SATN MFR SERPL: 14 % (ref 20–50)
IRON SERPL-MCNC: 43 UG/DL (ref 65–175)
KETONES UR STRIP-MCNC: NEGATIVE MG/DL
LEUKOCYTE ESTERASE UR QL STRIP: ABNORMAL
MUCOUS THREADS UR QL AUTO: ABNORMAL
NITRITE UR QL STRIP: NEGATIVE
NON-SQ EPI CELLS URNS QL MICRO: ABNORMAL /HPF
PH UR STRIP.AUTO: 5.5 [PH]
POTASSIUM SERPL-SCNC: 4.8 MMOL/L (ref 3.5–5.3)
PROT UR STRIP-MCNC: ABNORMAL MG/DL
PSA SERPL-MCNC: 0.2 NG/ML (ref 0–4)
RBC #/AREA URNS AUTO: ABNORMAL /HPF
SODIUM SERPL-SCNC: 135 MMOL/L (ref 135–147)
SP GR UR STRIP.AUTO: 1.03 (ref 1–1.03)
TIBC SERPL-MCNC: 318 UG/DL (ref 250–450)
UROBILINOGEN UR STRIP-ACNC: <2 MG/DL
WBC #/AREA URNS AUTO: ABNORMAL /HPF

## 2023-01-27 RX ORDER — ONDANSETRON 4 MG/1
TABLET, ORALLY DISINTEGRATING ORAL
Qty: 40 TABLET | Refills: 0 | Status: SHIPPED | OUTPATIENT
Start: 2023-01-27 | End: 2023-01-31

## 2023-01-27 RX ORDER — PANTOPRAZOLE SODIUM 40 MG/1
TABLET, DELAYED RELEASE ORAL
Qty: 30 TABLET | Refills: 0 | Status: SHIPPED | OUTPATIENT
Start: 2023-01-27

## 2023-01-27 NOTE — PROGRESS NOTES
PATIENT:  Robbin Collazo      1960    ASSESSMENT     1  Cellulitis of left toe        2  History of amputation of left great toe ([address] )        3  Dependent rubor               PLAN  Patient is doing well post-operatively  Sutures left intact  Incision was cleaned with betadine and DSD applied to be kept C/D/I  Continue post-op care as instructed  Stressed on patient compliance about proper off-loading, staying off of feet, and proper dressing care  Call if any increase in pain, fevers, calf pain, shortness of breath, or general distress is noted  Patient instructed to go to ER if call is not returned immediately  - cellulitis is resolved, continue abx  - likely dependent rubor to the periwound  HISTORY OF PRESENT ILLNESS  Patient presents for post-op appointment  Post-op pain is under control and resolving well  The patient is feeling well and in good spirits  Patient reported no post-op concern  Patient relates compliance with surgical shoe, elevation, and keeping dressing clean, dry and intact  REVIEW OF SYSTEMS  GENERAL: No fever or chills  HEART: No chest pain, or palpitation  RESPIRATORY:  No SOB or cough  GI: No Nausea, vomit or diarrhea  NEUROLOGIC: No syncope or acute weakness  MUSCULOSKELETAL: No calf pain or edema  PHYSICAL EXAMINATION  GENERAL  The patient appears in NAD / non-toxic  Afebrile  VSS    VASCULAR EXAM  Pedal pulses and vascular status are intact  No calf pain or edema bilaterally  No cyanosis  DERMATOLOGIC EXAM  Incision is coapted and healing well  No signs of infection  No active drainage  Normal post-op edema and ecchymosis  No necrosis or dehiscence  NEUROLOGIC EXAM  AAO X 3  No focal neurologic deficit  Neurologic status is intact BLE  MUSCULOSKELETAL EXAM  Good surgical correction  Normal post-op findings  ROM intact  No fluctuation or crepitus  Warm which is blanchable to the periwound

## 2023-01-28 LAB
A1AT SERPL-MCNC: 165 MG/DL (ref 101–187)
ACTIN IGG SERPL-ACNC: 13 UNITS (ref 0–19)
ANA SER QL IA: POSITIVE
BACTERIA UR CULT: NORMAL
CERULOPLASMIN SERPL-MCNC: 30.1 MG/DL (ref 16–31)
EST. AVERAGE GLUCOSE BLD GHB EST-MCNC: 157 MG/DL
HBA1C MFR BLD: 7.1 %
MITOCHONDRIA M2 IGG SER-ACNC: <20 UNITS (ref 0–20)

## 2023-01-30 DIAGNOSIS — R76.8 POSITIVE ANA (ANTINUCLEAR ANTIBODY): Primary | ICD-10-CM

## 2023-01-30 LAB
ANA HOMOGEN SER QL IF: NORMAL
ANA HOMOGEN TITR SER: NORMAL {TITER}
HCV RNA SERPL NAA+PROBE-ACNC: NORMAL IU/ML
TEST INFORMATION: NORMAL

## 2023-01-31 ENCOUNTER — TELEPHONE (OUTPATIENT)
Dept: FAMILY MEDICINE CLINIC | Facility: CLINIC | Age: 63
End: 2023-01-31

## 2023-01-31 ENCOUNTER — HOSPITAL ENCOUNTER (OUTPATIENT)
Dept: NUCLEAR MEDICINE | Facility: HOSPITAL | Age: 63
Discharge: HOME | End: 2023-01-31

## 2023-01-31 DIAGNOSIS — R68.81 EARLY SATIETY: ICD-10-CM

## 2023-01-31 DIAGNOSIS — E61.1 IRON DEFICIENCY: Primary | ICD-10-CM

## 2023-01-31 DIAGNOSIS — R11.14 BILIOUS VOMITING WITH NAUSEA: ICD-10-CM

## 2023-01-31 DIAGNOSIS — R11.11 VOMITING WITHOUT NAUSEA, UNSPECIFIED VOMITING TYPE: Primary | ICD-10-CM

## 2023-01-31 RX ORDER — FERROUS SULFATE TAB EC 324 MG (65 MG FE EQUIVALENT) 324 (65 FE) MG
324 TABLET DELAYED RESPONSE ORAL
Qty: 30 TABLET | Refills: 3 | Status: SHIPPED | OUTPATIENT
Start: 2023-01-31 | End: 2023-07-10 | Stop reason: SDUPTHER

## 2023-01-31 RX ORDER — ONDANSETRON 4 MG/1
4 TABLET, FILM COATED ORAL EVERY 8 HOURS PRN
Qty: 40 TABLET | Refills: 2 | Status: SHIPPED | OUTPATIENT
Start: 2023-01-31

## 2023-02-01 ENCOUNTER — HOME CARE VISIT (OUTPATIENT)
Dept: HOME HEALTH SERVICES | Facility: HOME HEALTHCARE | Age: 63
End: 2023-02-01

## 2023-02-01 VITALS
OXYGEN SATURATION: 97 % | SYSTOLIC BLOOD PRESSURE: 116 MMHG | DIASTOLIC BLOOD PRESSURE: 82 MMHG | TEMPERATURE: 96.8 F | HEART RATE: 88 BPM

## 2023-02-03 DIAGNOSIS — E78.5 HYPERLIPIDEMIA ASSOCIATED WITH TYPE 2 DIABETES MELLITUS (HCC): ICD-10-CM

## 2023-02-03 DIAGNOSIS — I74.10 AORTIC THROMBUS (HCC): ICD-10-CM

## 2023-02-03 DIAGNOSIS — E11.69 HYPERLIPIDEMIA ASSOCIATED WITH TYPE 2 DIABETES MELLITUS (HCC): ICD-10-CM

## 2023-02-03 RX ORDER — ATORVASTATIN CALCIUM 20 MG/1
TABLET, FILM COATED ORAL
Qty: 90 TABLET | Refills: 0 | Status: SHIPPED | OUTPATIENT
Start: 2023-02-03

## 2023-02-03 RX ORDER — LISINOPRIL 10 MG/1
TABLET ORAL
Qty: 90 TABLET | Refills: 0 | Status: SHIPPED | OUTPATIENT
Start: 2023-02-03

## 2023-02-06 ENCOUNTER — TELEPHONE (OUTPATIENT)
Dept: PODIATRY | Facility: CLINIC | Age: 63
End: 2023-02-06

## 2023-02-06 NOTE — TELEPHONE ENCOUNTER
Caller: Patient nephew     Doctor: Chun Yancey    Reason for call: How long can the stitches stay in ? Needs to be put on the schedule for  2/16  Should he be seen sooner?     Call back#: [phone]

## 2023-02-08 ENCOUNTER — OFFICE VISIT (OUTPATIENT)
Dept: PODIATRY | Facility: CLINIC | Age: 63
End: 2023-02-08

## 2023-02-08 VITALS — HEIGHT: 68 IN | WEIGHT: 173 LBS | BODY MASS INDEX: 26.22 KG/M2

## 2023-02-08 DIAGNOSIS — Z89.412 HISTORY OF AMPUTATION OF LEFT GREAT TOE (HCC): ICD-10-CM

## 2023-02-08 DIAGNOSIS — M79.672 PAIN OF LEFT FOOT: Primary | ICD-10-CM

## 2023-02-08 DIAGNOSIS — E11.49 OTHER DIABETIC NEUROLOGICAL COMPLICATION ASSOCIATED WITH TYPE 2 DIABETES MELLITUS (HCC): ICD-10-CM

## 2023-02-08 NOTE — PROGRESS NOTES
Assessment/Plan:    No problem-specific Assessment & Plan notes found for this encounter  Diagnoses and all orders for this visit:    Pain of left foot  -     X-ray foot left 3+ views; Future    History of amputation of left great toe (HCC)    Other diabetic neurological complication associated with type 2 diabetes mellitus ([facility] [address] )      -We discussed daily pedal checks, proper sizing of shoes and the need to be diligent a new onset rub areas etc   - We again discussed the importance of glycemic control and the need for out-of-control control of his diabetes to prevent further worsening of his systemic complications  - He would benefit from diabetic shoes in the future with toe spacer was prescribed  -He is to return as needed for at risk foot care  -X-rays deferred for now, we will plan for repeat x-rays in the next visit   -We spent time counseling on the need for strict management of his diabetes, he is very high risk to the extent of neuropathy, peripheral arterial disease proximal amputation on the right and digital amputation on the left  - I discussed that he does have for many toes with an AKA on the right  He does have a prosthetic but he does not really use it at home  We discussed that he is rather young for this severe pathology and he should really begin aggressive activity and rehab on his bilateral lower extremities to increase his activity level to increase his health    Subjective:      Patient ID: Dulce Maria Wilson is a 57 y o  male  Patient presents for evaluation management of his left great toe hallux amputation site has left the dressing intact as directed  Has no new pedal complaints  Has been watching blood sugars diligently  Denies any new onset neuropathic or diabetic complaints        The following portions of the patient's history were reviewed and updated as appropriate: allergies, current medications, past family history, past medical history, past social history, past surgical history and problem list     Review of Systems   Constitutional: Negative for chills and fever  HENT: Negative for ear pain and sore throat  Eyes: Negative for pain and visual disturbance  Respiratory: Negative for cough and shortness of breath  Cardiovascular: Negative for chest pain and palpitations  Gastrointestinal: Negative for abdominal pain and vomiting  Genitourinary: Negative for dysuria and hematuria  Musculoskeletal: Negative for arthralgias and back pain  Skin: Negative for color change and rash  Neurological: Negative for seizures and syncope  All other systems reviewed and are negative  Objective:      Ht 5' 8" (1 727 m)   Wt 78 5 kg (173 lb)   BMI 26 30 kg/m²          Physical Exam  Vitals reviewed  Constitutional:       Appearance: Normal appearance  He is obese  HENT:      Head: Normocephalic  Nose: Nose normal       Mouth/Throat:      Mouth: Mucous membranes are moist    Eyes:      Pupils: Pupils are equal, round, and reactive to light  Musculoskeletal:         General: No tenderness  Comments: Swelling and appearance of foot normal for the postoperative course, minimal   No evidence of dehiscence following suture removal amputation site is healed   Skin:     Capillary Refill: Capillary refill takes less than 2 seconds  Neurological:      General: No focal deficit present  Mental Status: He is alert and oriented to person, place, and time  Mental status is at baseline

## 2023-02-10 ENCOUNTER — HOME CARE VISIT (OUTPATIENT)
Dept: HOME HEALTH SERVICES | Facility: HOME HEALTHCARE | Age: 63
End: 2023-02-10

## 2023-02-11 DIAGNOSIS — J30.2 SEASONAL ALLERGIES: ICD-10-CM

## 2023-02-11 DIAGNOSIS — I74.10 AORTIC THROMBUS (HCC): ICD-10-CM

## 2023-02-11 DIAGNOSIS — S78.111A ABOVE-KNEE AMPUTATION OF RIGHT LOWER EXTREMITY (HCC): ICD-10-CM

## 2023-02-11 DIAGNOSIS — Z79.4 TYPE 2 DIABETES MELLITUS WITH DIABETIC POLYNEUROPATHY, WITH LONG-TERM CURRENT USE OF INSULIN (HCC): ICD-10-CM

## 2023-02-11 DIAGNOSIS — E11.42 TYPE 2 DIABETES MELLITUS WITH DIABETIC POLYNEUROPATHY, WITH LONG-TERM CURRENT USE OF INSULIN (HCC): ICD-10-CM

## 2023-02-11 DIAGNOSIS — G89.4 CHRONIC PAIN SYNDROME: ICD-10-CM

## 2023-02-11 RX ORDER — LEVOCETIRIZINE DIHYDROCHLORIDE 5 MG/1
TABLET, FILM COATED ORAL
Qty: 90 TABLET | Refills: 0 | Status: SHIPPED | OUTPATIENT
Start: 2023-02-11

## 2023-02-13 VITALS
TEMPERATURE: 96 F | RESPIRATION RATE: 18 BRPM | SYSTOLIC BLOOD PRESSURE: 120 MMHG | OXYGEN SATURATION: 98 % | HEART RATE: 84 BPM | DIASTOLIC BLOOD PRESSURE: 72 MMHG

## 2023-02-13 RX ORDER — APIXABAN 5 MG/1
TABLET, FILM COATED ORAL
Qty: 180 TABLET | Refills: 0 | Status: SHIPPED | OUTPATIENT
Start: 2023-02-13

## 2023-02-13 RX ORDER — SITAGLIPTIN AND METFORMIN HYDROCHLORIDE 1000; 50 MG/1; MG/1
TABLET, FILM COATED ORAL
Qty: 180 TABLET | Refills: 0 | Status: SHIPPED | OUTPATIENT
Start: 2023-02-13

## 2023-02-13 RX ORDER — EMPAGLIFLOZIN 25 MG/1
TABLET, FILM COATED ORAL
Qty: 30 TABLET | Refills: 0 | Status: SHIPPED | OUTPATIENT
Start: 2023-02-13

## 2023-02-14 ENCOUNTER — OFFICE VISIT (OUTPATIENT)
Dept: FAMILY MEDICINE CLINIC | Facility: CLINIC | Age: 63
End: 2023-02-14

## 2023-02-14 VITALS
OXYGEN SATURATION: 99 % | HEART RATE: 115 BPM | DIASTOLIC BLOOD PRESSURE: 70 MMHG | SYSTOLIC BLOOD PRESSURE: 122 MMHG | TEMPERATURE: 98 F | HEIGHT: 68 IN | BODY MASS INDEX: 26.3 KG/M2 | RESPIRATION RATE: 16 BRPM

## 2023-02-14 DIAGNOSIS — Z79.4 TYPE 2 DIABETES MELLITUS WITH HYPERGLYCEMIA, WITH LONG-TERM CURRENT USE OF INSULIN (HCC): Primary | Chronic | ICD-10-CM

## 2023-02-14 DIAGNOSIS — L97.521 ULCER OF LEFT FOOT, LIMITED TO BREAKDOWN OF SKIN (HCC): ICD-10-CM

## 2023-02-14 DIAGNOSIS — E11.65 TYPE 2 DIABETES MELLITUS WITH HYPERGLYCEMIA, WITH LONG-TERM CURRENT USE OF INSULIN (HCC): Primary | Chronic | ICD-10-CM

## 2023-02-14 DIAGNOSIS — G47.9 SLEEP DISTURBANCE: ICD-10-CM

## 2023-02-14 DIAGNOSIS — K31.84 GASTROPARESIS: ICD-10-CM

## 2023-02-14 RX ORDER — TRAZODONE HYDROCHLORIDE 50 MG/1
50 TABLET ORAL
Qty: 30 TABLET | Refills: 0 | Status: SHIPPED | OUTPATIENT
Start: 2023-02-14

## 2023-02-14 RX ORDER — DULOXETIN HYDROCHLORIDE 60 MG/1
CAPSULE, DELAYED RELEASE ORAL
Qty: 30 CAPSULE | Refills: 0 | Status: SHIPPED | OUTPATIENT
Start: 2023-02-14

## 2023-02-14 NOTE — PROGRESS NOTES
Name: Dontrell Matos      : 1960      MRN: [id number]  Encounter Provider: VERONICA Yuen  Encounter Date: 2023   Encounter department: [address] [place] [address]  Type 2 diabetes mellitus with hyperglycemia, with long-term current use of insulin (HCC)  -     HEMOGLOBIN A1C W/ EAG ESTIMATION; Future; Expected date: 2023  -     Comprehensive metabolic panel; Future; Expected date: 2023    2  Gastroparesis  -     Ambulatory Referral to Nutrition Services; Future    3  Sleep disturbance  -     traZODone (DESYREL) 50 mg tablet; Take 1 tablet (50 mg total) by mouth daily at bedtime as needed for sleep    4  Ulcer of left foot, limited to breakdown of skin ([address] )         Subjective      Patient presents for smoking cessation  He has not been able to quit or decrease smoking at this time  He states that he knows that he needs to quit smoking for his long-term health but he is not ready at this time  He is smoking around 12 cigarettes a day  Support and encouragement provided  Return when ready to quit smoking  Patient had gastric emptying completed which showed moderate to severe delay in emptying  Counseled on diet and lifestyle modifications for gastroparesis  Recommend follow-up with nutritionist as well with GI  Continue to use Zofran as needed  Sleep disturbances-patient states that he continues to wake in the middle of the night with panic attacks and severe anxiety  He states that when it happens it is difficult for him to calm down  He is only sleeping a few hours per night and he is unable to fall asleep and stay asleep  He did try the Atarax and it did not help his symptoms  Does not have anxiety throughout the day  Try trazodone for sleep follow-up in 6 weeks or sooner if needed  Wound-patient had surgery on 120 for hallux, toe amputation  Patient did have his sutures removed the other day podiatry    However, now he has an ulceration next to where the sutures were removed along with some scant amount of purulent drainage and pain  Schedule follow-up with podiatry  Wound care reviewed with patient  See media tab for imaging of wound  Review of Systems   Constitutional: Negative for activity change and fever  HENT: Negative for ear pain and sore throat  Eyes: Negative for pain and visual disturbance  Respiratory: Negative for cough and shortness of breath  Cardiovascular: Negative for chest pain and palpitations  Gastrointestinal: Positive for vomiting  Negative for abdominal pain, blood in stool, constipation, diarrhea and nausea  Genitourinary: Negative for dysuria and hematuria  Musculoskeletal: Negative for arthralgias and back pain  Skin: Positive for wound  Negative for color change, pallor and rash  Neurological: Negative for seizures and syncope  All other systems reviewed and are negative        Current Outpatient Medications on File Prior to Visit   Medication Sig   • acetaminophen (TYLENOL) 325 mg tablet Take 3 tablets (975 mg total) by mouth every 8 (eight) hours   • albuterol (PROVENTIL HFA,VENTOLIN HFA) 90 mcg/act inhaler Inhale 2 puffs every 4 (four) hours as needed for wheezing   • aspirin 81 mg chewable tablet Chew 81 mg daily   • atorvastatin (LIPITOR) 20 mg tablet Take 1 tablet by mouth once daily   • Blood Glucose Monitoring Suppl (OneTouch Verio) w/Device KIT Use to test blood sugars 3 times daily   • cholecalciferol (VITAMIN D3) 1,000 units tablet Take 2 tablets (2,000 Units total) by mouth daily   • Continuous Blood Gluc  (FreeStyle No 2 [place]) JOSEPH Use as directed   • Continuous Blood Gluc Sensor (FreeStyle Ke 2 Sensor) MISC Change every 14 days   • Diclofenac Sodium (VOLTAREN) 1 % Apply 4 g topically 4 (four) times a day as needed (as needed for pain)   • DULoxetine (CYMBALTA) 60 mg delayed release capsule Take 1 capsule by mouth once daily at bedtime   • Eliquis 5 MG Take 1 tablet by mouth twice daily   • famotidine (PEPCID) 20 mg tablet Take 1 tablet (20 mg total) by mouth 2 (two) times a day   • ferrous sulfate 324 (65 Fe) mg Take 1 tablet (324 mg total) by mouth daily before breakfast   • fluticasone (FLONASE) 50 mcg/act nasal spray 1 spray into each nostril daily (Patient taking differently: 1 spray into each nostril if needed)   • glucose blood test strip Use 1 each 3 (three) times daily after meals Use as instructed   • insulin glargine (LANTUS) 100 units/mL subcutaneous injection Inject 29 Units under the skin daily at bedtime   • insulin lispro (HumaLOG KwikPen) 100 units/mL injection pen Blood Glucose 150 - 224: 4 Unit of Insulin Blood Glucose 225 - 299: 5 Units of Insulin Blood Glucose 300 - 374: 6 Units of Insulin Blood Glucose 375 - 449: 7 Units of Insulin Blood Glucose greater than or equal to 450: 8 Units of Insulin Max of 24 units per day   • Insulin Pen Needle 31G X 5 MM MISC Use daily Pt to inject 4 X daily (Patient taking differently: Use Check sugars three times a day)   • Insulin Pen Needle 31G X 5 MM MISC 30 units sq 2X daily   • Insulin Syringe-Needle U-100 (BD Veo Insulin Syringe U/F) 31G X 15/64" 0 5 ML MISC Inject under the skin 3 (three) times a day Use as directed (Patient taking differently: Inject under the skin 3 (three) times a day Check sugars three times a day)   • Janumet  MG per tablet TAKE 1 TABLET BY MOUTH TWICE DAILY WITH MEALS   • Jardiance 25 MG TABS TAKE 1 TABLET BY MOUTH ONCE DAILY IN THE MORNING   • levocetirizine (XYZAL) 5 MG tablet TAKE 1 TABLET BY MOUTH ONCE DAILY IN THE EVENING   • lidocaine (LMX) 4 % cream Apply topically 4 (four) times a day as needed for mild pain   • lisinopril (ZESTRIL) 10 mg tablet Take 1 tablet by mouth once daily   • magnesium oxide (MAG-OX) 400 mg Take 1 tablet (400 mg total) by mouth daily   • menthol-methyl salicylate (BENGAY) 93-06 % cream Apply topically 4 (four) times a day as needed (torticollis)   • methadone (DOLOPHINE) 10 mg tablet Take 70 mg by mouth daily,   • multivitamin-minerals (CENTRUM ADULTS) tablet Take 1 tablet by mouth daily   • naloxone (NARCAN) 4 mg/0 1 mL nasal spray Administer 1 spray into a nostril  If no response after 2-3 minutes, give another dose in the other nostril using a new spray  • nicotine (NICODERM CQ) 21 mg/24 hr TD 24 hr patch Place 1 patch on the skin every 24 hours   • ondansetron (ZOFRAN) 4 mg tablet Take 1 tablet (4 mg total) by mouth every 8 (eight) hours as needed for nausea or vomiting   • pantoprazole (PROTONIX) 40 mg tablet Take 1 tablet by mouth once daily   • silver sulfadiazine (SILVADENE,SSD) 1 % cream Apply topically daily Apply as directed to left heel ulcer daily with each dressing change  (Patient taking differently: Apply topically if needed)   • [DISCONTINUED] hydrOXYzine HCL (ATARAX) 25 mg tablet Take 1 tablet (25 mg total) by mouth every 6 (six) hours as needed for anxiety   • [DISCONTINUED] DULoxetine (CYMBALTA) 60 mg delayed release capsule Take 1 capsule (60 mg total) by mouth daily at bedtime       Objective     /70   Pulse (!) 115   Temp 98 °F (36 7 °C)   Resp 16   Ht 5' 8" (1 727 m)   SpO2 99%   BMI 26 30 kg/m²     Physical Exam  Vitals and nursing note reviewed  Constitutional:       General: He is not in acute distress  Appearance: Normal appearance  He is not ill-appearing or toxic-appearing  HENT:      Head: Normocephalic and atraumatic  Cardiovascular:      Rate and Rhythm: Normal rate and regular rhythm  Pulses: Normal pulses  Heart sounds: Normal heart sounds  No murmur heard  No friction rub  No gallop  Pulmonary:      Effort: Pulmonary effort is normal  No respiratory distress  Breath sounds: Normal breath sounds  No stridor  No wheezing  Abdominal:      General: Abdomen is flat  Bowel sounds are normal       Palpations: Abdomen is soft     Musculoskeletal:         General: No deformity  Cervical back: Normal range of motion and neck supple  No rigidity  No muscular tenderness  Feet:       Right Lower Extremity: Right leg is amputated above knee  Skin:     General: Skin is warm and dry  Capillary Refill: Capillary refill takes less than 2 seconds  Coloration: Skin is not jaundiced or pale  Findings: No bruising  Neurological:      General: No focal deficit present  Mental Status: He is alert and oriented to person, place, and time  Mental status is at baseline  Psychiatric:         Mood and Affect: Mood normal          Behavior: Behavior normal          Thought Content:  Thought content normal          Judgment: Judgment normal        VERONICA Cordova

## 2023-02-14 NOTE — LETTER
February 14, 2023     Noble Covert, [facility]    Patient: Marquita Nettles   YOB: 1960   Date of Visit: 2/14/2023       Dear Dr Sonali Liriano:    I saw Juvenal Morales in the office today  He recent had his sutures removed post-amputation and that area is healing well  However, next to it, he is developing an ulcer  There was scant purulent drainage  It was not warm to touch  We will be calling to schedule follow-up appointment with few and there are images attached in media tab  Please let me know if you would like me to refer him to wound care  If you have questions, please do not hesitate to call me  I look forward to following Graicela Goodman along with you           Sincerely,        VERONICA Bay        CC: No Recipients

## 2023-02-15 ENCOUNTER — HOME CARE VISIT (OUTPATIENT)
Dept: HOME HEALTH SERVICES | Facility: HOME HEALTHCARE | Age: 63
End: 2023-02-15

## 2023-02-15 VITALS
TEMPERATURE: 97.4 F | RESPIRATION RATE: 20 BRPM | HEART RATE: 80 BPM | SYSTOLIC BLOOD PRESSURE: 122 MMHG | OXYGEN SATURATION: 97 % | DIASTOLIC BLOOD PRESSURE: 80 MMHG

## 2023-02-22 ENCOUNTER — HOME CARE VISIT (OUTPATIENT)
Dept: HOME HEALTH SERVICES | Facility: HOME HEALTHCARE | Age: 63
End: 2023-02-22

## 2023-02-22 VITALS
OXYGEN SATURATION: 96 % | TEMPERATURE: 97.2 F | RESPIRATION RATE: 20 BRPM | SYSTOLIC BLOOD PRESSURE: 116 MMHG | HEART RATE: 88 BPM | DIASTOLIC BLOOD PRESSURE: 70 MMHG

## 2023-03-02 ENCOUNTER — HOME CARE VISIT (OUTPATIENT)
Dept: HOME HEALTH SERVICES | Facility: HOME HEALTHCARE | Age: 63
End: 2023-03-02

## 2023-03-06 VITALS
RESPIRATION RATE: 20 BRPM | DIASTOLIC BLOOD PRESSURE: 70 MMHG | SYSTOLIC BLOOD PRESSURE: 118 MMHG | TEMPERATURE: 98.3 F | HEART RATE: 92 BPM | OXYGEN SATURATION: 95 %

## 2023-03-07 ENCOUNTER — OFFICE VISIT (OUTPATIENT)
Dept: WOUND CARE | Facility: CLINIC | Age: 63
End: 2023-03-07

## 2023-03-07 VITALS
SYSTOLIC BLOOD PRESSURE: 122 MMHG | RESPIRATION RATE: 18 BRPM | TEMPERATURE: 96.8 F | DIASTOLIC BLOOD PRESSURE: 76 MMHG | HEART RATE: 76 BPM

## 2023-03-07 DIAGNOSIS — E08.621 DIABETIC ULCER OF LEFT MIDFOOT ASSOCIATED WITH DIABETES MELLITUS DUE TO UNDERLYING CONDITION, LIMITED TO BREAKDOWN OF SKIN (HCC): Primary | ICD-10-CM

## 2023-03-07 DIAGNOSIS — L97.421 DIABETIC ULCER OF LEFT MIDFOOT ASSOCIATED WITH DIABETES MELLITUS DUE TO UNDERLYING CONDITION, LIMITED TO BREAKDOWN OF SKIN (HCC): Primary | ICD-10-CM

## 2023-03-07 NOTE — PROGRESS NOTES
Patient ID: Calderon Childress is a 70 y o  male Date of Birth 1960       Chief Complaint   Patient presents with   • New Patient Visit     Initial visit for wound to left medial foot       Allergies:  Varenicline    Diagnosis:  1  Diabetic ulcer of left midfoot associated with diabetes mellitus due to underlying condition, limited to breakdown of skin ([facility] [address] )       Diagnosis ICD-10-CM Associated Orders   1  Diabetic ulcer of left midfoot associated with diabetes mellitus due to underlying condition, limited to breakdown of skin [facility])  V25 [id number]     L97 [id number]            Assessment & Plan:  See wound orders    -His shoes are too tight, advised to throw this shoe in the trash, he is to be weightbearing as tolerated to the left foot in surgical shoe, I advised to follow-up with  and possible for sizing and dispensing of diabetic shoe with custom inserts for the toe filler  - Selective debridement of the wound of the left foot today  - Advised use of Santyl at home with daily changes  - He is return in 1 week for hopeful repeat debridement  - I discussed hammertoe formation following hallux amputation at length, thankfully these areas were flexible with them but they are likely going to be severely progressive throughout this year  If he does have any rigidity or any distal tip overload he will likely need hammertoe correction  Subjective:   Patient presents for evaluation management of his left foot, he has not gotten a call about his diabetic shoes  He is ambulating in normal shoes and did not believe that they were very tight but states that he is wearing something snug for stability    States that he notes a area on the top of his left second toe with no drainage, is also complaining of curling this of the second toe and also a new onset area that is draining on the hallux amputation site of the left foot      The following portions of the patient's history were reviewed and updated as appropriate: Patient Active Problem List   Diagnosis   • Chronic hepatitis C without hepatic coma ([place] [address] )   • Essential hypertension   • Hyperlipidemia associated with type 2 diabetes mellitus ([place] [address] )   • Methadone maintenance therapy patient ([place] [address] )   • Type 2 diabetes mellitus with diabetic polyneuropathy ([place] [address] )   • Diabetic ulcer of toe of left foot associated with type 2 diabetes mellitus, with bone involvement without evidence of necrosis (HCC)   • Bilateral lower extremity edema   • Positive depression screening   • Mononeuropathy, unspecified   • Mixed hyperlipidemia   • Proteinuria   • Vitamin D deficiency   • Type 2 diabetes mellitus, with long-term current use of insulin (HCC)   • Acute respiratory failure with hypoxia (HCC)   • Hematuria   • Aortic thrombus (HCC)   • Prolonged Q-T interval on ECG   • Empyema lung (HCC)   • S/P AKA (above knee amputation), right (HCC)   • Cervical radiculopathy   • Chronic bilateral low back pain   • Above-knee amputation of right lower extremity ([facility])   • Drug-induced constipation   • Moderate protein-calorie malnutrition ([facility])   • Chronic pain syndrome   • Chronic hyponatremia   • Depression   • History of COVID-19   • Pulmonary fibrosis (HCC)   • Foot drop, left   • PAD (peripheral artery disease) ([facility])   • Diabetic neuropathy ([facility])   • Ulcer of left foot, limited to breakdown of skin ([place] [address] )     Past Medical History:   Diagnosis Date   • Arthritis    • Diabetes mellitus ([place] [address] )    • DVT (deep venous thrombosis) ([facility])    • GERD (gastroesophageal reflux disease)    • History of COVID-19 2020    severe disease, intubated/ICU x 1 month   • History of drug abuse ([place] [address] )     heroin abuse, clean since 2003   • Hypercholesteremia    • Hypertension    • Ischemia of right lower extremity with suspected rhabdomyolysis 02/06/2021   • Left Hydropneumothorax 02/10/2021   • Methadone use    • Pneumonia due to COVID-19 virus 01/11/2021   • Subacute osteomyelitis of right femur ([place] [address] ) 09/13/2021   • Vascular disorder of lower extremity      Past Surgical History:   Procedure Laterality Date   • AMPUTATION ABOVE KNEE (AKA) Right 2021    Procedure: AMPUTATION ABOVE KNEE (AKA); Surgeon: Nate De León MD;  [facility]: [facility];  Service: Vascular   • AMPUTATION ABOVE KNEE (AKA) Right 2021    Procedure: AMPUTATION ABOVE KNEE (AKA) FORMALIZATION,  R AKA wound washout, wound closure;  Surgeon: Nate De León MD;  [facility];  Service: Vascular   • ARTERIOGRAM Right 2021    Procedure: ARTERIOGRAM;  Surgeon: Gael Jean DO;  [facility];  Service: Vascular   • BRONCHOSCOPY     • IR CHEST TUBE PLACEMENT  02/10/2021   • IR LOWER EXTREMITY ANGIOGRAM  2021   • IR LOWER EXTREMITY ANGIOGRAM  2022   • [place] AMPUTATION TOE METATARSOPHALANGEAL JOINT Left 2023    Procedure: AMPUTATION TOE- hallux;   Surgeon: Tory Chavez DPM;  Location: [facility];  Service: Podiatry   • WV Lennice Medal 3RD+ ORD SLCTV ABDL PEL/[facility] Left 2022    Procedure: LEG AGRAM W/ INTERVENTION;  Surgeon: Amna Owusu MD;  Location: [facility];  Service: Vascular   • THROMBECTOMY W/ EMBOLECTOMY Right 2021    Procedure: EMBOLECTOMY/THROMBECTOMY LOWER EXTREMITY;  Surgeon: Gael Jean DO;  [facility];  Service: Vascular     Social History     Socioeconomic History   • Marital status:      Spouse name: None   • Number of children: None   • Years of education: None   • Highest education level: None   Occupational History   • None   Tobacco Use   • Smoking status: Some Days     Packs/day: 0 25     Types: Cigarettes     Last attempt to quit: 2022     Years since quittin 2   • Smokeless tobacco: Never   Vaping Use   • Vaping Use: Never used   Substance and Sexual Activity   • Alcohol use: Not Currently   • Drug use: Not Currently     Types: Heroin     Comment: clean since , now on methadone   • Sexual activity: None   Other Topics Concern   • None   Social History Narrative   • None     Social Determinants of Health     Financial Resource Strain: Not on file   Food Insecurity: Not on file   Transportation Needs: Not on file   Physical Activity: Not on file   Stress: Not on file   Social Connections: Not on file   Intimate Partner Violence: Not on file   Housing Stability: Not on file        Current Outpatient Medications:   •  acetaminophen (TYLENOL) 325 mg tablet, Take 3 tablets (975 mg total) by mouth every 8 (eight) hours, Disp: , Rfl: 0  •  albuterol (PROVENTIL HFA,VENTOLIN HFA) 90 mcg/act inhaler, Inhale 2 puffs every 4 (four) hours as needed for wheezing, Disp: , Rfl: 0  •  aspirin 81 mg chewable tablet, Chew 81 mg daily, Disp: , Rfl:   •  atorvastatin (LIPITOR) 20 mg tablet, Take 1 tablet by mouth once daily, Disp: 90 tablet, Rfl: 0  •  Blood Glucose Monitoring Suppl (OneTouch Verio) w/Device KIT, Use to test blood sugars 3 times daily, Disp: 1 kit, Rfl: 0  •  cholecalciferol (VITAMIN D3) 1,000 units tablet, Take 2 tablets (2,000 Units total) by mouth daily, Disp: 12 tablet, Rfl: 0  •  Continuous Blood Gluc  (FreeStyle Ke 2 [place]) JOSEPH, Use as directed, Disp: 1 each, Rfl: 0  •  Continuous Blood Gluc Sensor (FreeStyle Ke 2 Sensor) MISC, Change every 14 days, Disp: 6 each, Rfl: 2  •  Diclofenac Sodium (VOLTAREN) 1 %, Apply 4 g topically 4 (four) times a day as needed (as needed for pain), Disp: 50 g, Rfl: 1  •  DULoxetine (CYMBALTA) 60 mg delayed release capsule, Take 1 capsule by mouth once daily at bedtime, Disp: 30 capsule, Rfl: 0  •  Eliquis 5 MG, Take 1 tablet by mouth twice daily, Disp: 180 tablet, Rfl: 0  •  famotidine (PEPCID) 20 mg tablet, Take 1 tablet (20 mg total) by mouth 2 (two) times a day, Disp: 30 tablet, Rfl: 8  •  ferrous sulfate 324 (65 Fe) mg, Take 1 tablet (324 mg total) by mouth daily before breakfast, Disp: 30 tablet, Rfl: 3  •  fluticasone (FLONASE) 50 mcg/act nasal spray, 1 spray into each nostril daily (Patient taking differently: 1 spray into each nostril if needed), Disp: 16 g, Rfl: 2  •  glucose blood test strip, Use 1 each 3 (three) times daily after meals Use as instructed, Disp: 270 each, Rfl: 3  •  insulin glargine (LANTUS) 100 units/mL subcutaneous injection, Inject 29 Units under the skin daily at bedtime, Disp: 10 mL, Rfl: 3  •  insulin lispro (HumaLOG KwikPen) 100 units/mL injection pen, Blood Glucose 150 - 224: 4 Unit of Insulin Blood Glucose 225 - 299: 5 Units of Insulin Blood Glucose 300 - 374: 6 Units of Insulin Blood Glucose 375 - 449: 7 Units of Insulin Blood Glucose greater than or equal to 450: 8 Units of Insulin Max of 24 units per day, Disp: 15 mL, Rfl: 3  •  Insulin Pen Needle 31G X 5 MM MISC, Use daily Pt to inject 4 X daily (Patient taking differently: Use Check sugars three times a day), Disp: 100 each, Rfl: 5  •  Insulin Pen Needle 31G X 5 MM MISC, 30 units sq 2X daily, Disp: 100 each, Rfl: 3  •  Insulin Syringe-Needle U-100 (BD Veo Insulin Syringe U/F) 31G X 15/64" 0 5 ML MISC, Inject under the skin 3 (three) times a day Use as directed (Patient taking differently: Inject under the skin 3 (three) times a day Check sugars three times a day), Disp: 300 each, Rfl: 5  •  Janumet  MG per tablet, TAKE 1 TABLET BY MOUTH TWICE DAILY WITH MEALS, Disp: 180 tablet, Rfl: 0  •  Jardiance 25 MG TABS, TAKE 1 TABLET BY MOUTH ONCE DAILY IN THE MORNING, Disp: 30 tablet, Rfl: 0  •  levocetirizine (XYZAL) 5 MG tablet, TAKE 1 TABLET BY MOUTH ONCE DAILY IN THE EVENING, Disp: 90 tablet, Rfl: 0  •  lidocaine (LMX) 4 % cream, Apply topically 4 (four) times a day as needed for mild pain, Disp: 30 g, Rfl: 0  •  lisinopril (ZESTRIL) 10 mg tablet, Take 1 tablet by mouth once daily, Disp: 90 tablet, Rfl: 0  •  magnesium oxide (MAG-OX) 400 mg, Take 1 tablet (400 mg total) by mouth daily, Disp: 90 tablet, Rfl: 1  •  menthol-methyl salicylate (BENGAY) 43-64 % cream, Apply topically 4 (four) times a day as needed (torticollis), Disp: , Rfl: 0  •  methadone (DOLOPHINE) 10 mg tablet, Take 70 mg by mouth daily,, Disp: , Rfl:   •  multivitamin-minerals (CENTRUM ADULTS) tablet, Take 1 tablet by mouth daily, Disp: , Rfl: 0  •  naloxone (NARCAN) 4 mg/0 1 mL nasal spray, Administer 1 spray into a nostril  If no response after 2-3 minutes, give another dose in the other nostril using a new spray , Disp: 1 each, Rfl: 1  •  nicotine (NICODERM CQ) 21 mg/24 hr TD 24 hr patch, Place 1 patch on the skin every 24 hours, Disp: 28 patch, Rfl: 0  •  ondansetron (ZOFRAN) 4 mg tablet, Take 1 tablet (4 mg total) by mouth every 8 (eight) hours as needed for nausea or vomiting, Disp: 40 tablet, Rfl: 2  •  pantoprazole (PROTONIX) 40 mg tablet, Take 1 tablet by mouth once daily, Disp: 30 tablet, Rfl: 0  •  silver sulfadiazine (SILVADENE,SSD) 1 % cream, Apply topically daily Apply as directed to left heel ulcer daily with each dressing change  (Patient taking differently: Apply topically if needed), Disp: 25 g, Rfl: 2  •  traZODone (DESYREL) 50 mg tablet, Take 1 tablet (50 mg total) by mouth daily at bedtime as needed for sleep, Disp: 30 tablet, Rfl: 0  Family History   Problem Relation Age of Onset   • Diabetes Mother    • Hypertension Father    • Leukemia Father    • Acute lymphoblastic leukemia Father    • Cancer Sister       Review of Systems   Constitutional: Negative for chills and fever  HENT: Negative for ear pain and sore throat  Eyes: Negative for pain and visual disturbance  Respiratory: Negative for cough and shortness of breath  Cardiovascular: Negative for chest pain and palpitations  Gastrointestinal: Negative for abdominal pain and vomiting  Genitourinary: Negative for dysuria and hematuria  Musculoskeletal: Negative for arthralgias and back pain  Skin: Negative for color change and rash  Neurological: Negative for seizures and syncope  All other systems reviewed and are negative          Objective:  /76 Pulse 76   Temp (!) 96 8 °F (36 °C)   Resp 18     Physical Exam  Musculoskeletal:      Comments: He has a deep tissue injury on the dorsal aspect of left second digit with flexible hammertoe formation of the lesser digits 2 through 5  There is also new onset ulceration to the medial aspect of the left foot with small area of callosity overlying this limited breakdown of skin  Wound 03/07/23 Diabetic Ulcer Foot Left;Medial (Active)   Wound Description Epithelialization;Granulation tissue;Slough 03/07/23 1349   [place]-wound Assessment Clean;Dry; Intact 03/07/23 1349   Wound Length (cm) 1 cm 03/07/23 1349   Wound Width (cm) 0 5 cm 03/07/23 1349   Wound Depth (cm) 0 1 cm 03/07/23 1349   Wound Surface Area (cm^2) 0 5 cm^2 03/07/23 1349   Wound Volume (cm^3) 0 05 cm^3 03/07/23 1349   Calculated Wound Volume (cm^3) 0 05 cm^3 03/07/23 1349   Drainage Amount Small 03/07/23 1349   Non-staged Wound Description Full thickness 03/07/23 1349   Treatments Cleansed 03/07/23 1349                         Debridement   Wound 03/07/23 Diabetic Ulcer Foot Left;Medial    Universal Protocol:  Consent: Verbal consent obtained    Risks and benefits: risks, benefits and alternatives were discussed  Consent given by: patient  Patient understanding: patient states understanding of the procedure being performed  Patient identity confirmed: verbally with patient      Performed by: physician  Debridement type: selective  Pain control: lidocaine 4%  Post-debridement measurements  Length (cm): 1  Width (cm): 0 6  Depth (cm): 0 1  Percent debrided: 100%  Surface Area (cm^2): 0 6  Area debrided (cm^2): 0 6  Volume (cm^3): 0 06  Devitalized tissue debrided: callus and slough  Instrument(s) utilized: curette  Bleeding: small  Hemostasis obtained with: pressure  Procedural pain (0-10): insensate  Post-procedural pain: insensate   Response to treatment: procedure was tolerated well                   Wound Instructions:  No orders of the defined types were placed in this encounter  Kacey Osborne DPM      Portions of the record may have been created with voice recognition software  Occasional wrong word or "sound a like" substitutions may have occurred due to the inherent limitations of voice recognition software  Read the chart carefully and recognize, using context, where substitutions have occurred

## 2023-03-07 NOTE — PATIENT INSTRUCTIONS
Orders Placed This Encounter   Procedures    Wound cleansing and dressings     Left foot    Wash your hands with soap and water  Remove old dressing, discard into plastic bag and place in trash  Cleanse the wound with mild soap and water prior to applying a clean dressing  Do not use tissue or cotton balls  Do not scrub the wound  Pat dry using gauze  Shower yes do not get the dressing wet  Apply skin prep to skin surrounding wound  Apply santyl to the left foot wound    Cover with 4x4  Secure with rolled gauze and tape  Change dressing daily    IN the office use silver gel and cover with 4x4 and secure with rolled gauze and tape    Follow up in one week    Wear your surgical shoe at all times    Call  for diabetic shoes    [facility] to continue with dressing changes and wound assessment at least once a week     Standing Status:   Future     Standing Expiration Date:   3/7/2024

## 2023-03-09 ENCOUNTER — HOME CARE VISIT (OUTPATIENT)
Dept: HOME HEALTH SERVICES | Facility: HOME HEALTHCARE | Age: 63
End: 2023-03-09

## 2023-03-10 DIAGNOSIS — Z79.4 TYPE 2 DIABETES MELLITUS WITH HYPERGLYCEMIA, WITH LONG-TERM CURRENT USE OF INSULIN (HCC): ICD-10-CM

## 2023-03-10 DIAGNOSIS — E11.65 TYPE 2 DIABETES MELLITUS WITH HYPERGLYCEMIA, WITH LONG-TERM CURRENT USE OF INSULIN (HCC): ICD-10-CM

## 2023-03-10 DIAGNOSIS — E11.42 TYPE 2 DIABETES MELLITUS WITH DIABETIC POLYNEUROPATHY, WITH LONG-TERM CURRENT USE OF INSULIN (HCC): ICD-10-CM

## 2023-03-10 DIAGNOSIS — G89.4 CHRONIC PAIN SYNDROME: ICD-10-CM

## 2023-03-10 DIAGNOSIS — G47.9 SLEEP DISTURBANCE: ICD-10-CM

## 2023-03-10 DIAGNOSIS — S78.111A ABOVE-KNEE AMPUTATION OF RIGHT LOWER EXTREMITY (HCC): ICD-10-CM

## 2023-03-10 DIAGNOSIS — Z79.4 TYPE 2 DIABETES MELLITUS WITH DIABETIC POLYNEUROPATHY, WITH LONG-TERM CURRENT USE OF INSULIN (HCC): ICD-10-CM

## 2023-03-10 LAB — COLOGUARD RESULT REPORTABLE: NORMAL

## 2023-03-10 RX ORDER — DULOXETIN HYDROCHLORIDE 60 MG/1
CAPSULE, DELAYED RELEASE ORAL
Qty: 30 CAPSULE | Refills: 0 | Status: SHIPPED | OUTPATIENT
Start: 2023-03-10

## 2023-03-10 RX ORDER — TRAZODONE HYDROCHLORIDE 50 MG/1
TABLET ORAL
Qty: 30 TABLET | Refills: 0 | Status: SHIPPED | OUTPATIENT
Start: 2023-03-10

## 2023-03-13 RX ORDER — EMPAGLIFLOZIN 25 MG/1
TABLET, FILM COATED ORAL
Qty: 30 TABLET | Refills: 0 | Status: SHIPPED | OUTPATIENT
Start: 2023-03-13

## 2023-03-13 NOTE — ASSESSMENT & PLAN NOTE
Patient with chronic low back pain and cervical radiculopathy  Continue naloxone as needed Refill requested for:     Zolpidem    Last filled on:     12/2/22  #30  3 refills  Last office visit:     12/2/22  Pre op  Pending office visit none    Medication can not be filled by protocol. Physician authorization required.  PDMP reviewed; no aberrant behavior identified

## 2023-03-14 VITALS
HEART RATE: 76 BPM | TEMPERATURE: 97.7 F | OXYGEN SATURATION: 98 % | RESPIRATION RATE: 16 BRPM | DIASTOLIC BLOOD PRESSURE: 82 MMHG | SYSTOLIC BLOOD PRESSURE: 128 MMHG

## 2023-03-15 ENCOUNTER — TELEPHONE (OUTPATIENT)
Dept: ENDOCRINOLOGY | Facility: CLINIC | Age: 63
End: 2023-03-15

## 2023-03-15 ENCOUNTER — OFFICE VISIT (OUTPATIENT)
Dept: ENDOCRINOLOGY | Facility: CLINIC | Age: 63
End: 2023-03-15

## 2023-03-15 VITALS
SYSTOLIC BLOOD PRESSURE: 130 MMHG | HEART RATE: 100 BPM | BODY MASS INDEX: 26.3 KG/M2 | TEMPERATURE: 96.9 F | HEIGHT: 68 IN | DIASTOLIC BLOOD PRESSURE: 70 MMHG

## 2023-03-15 DIAGNOSIS — E11.65 TYPE 2 DIABETES MELLITUS WITH HYPERGLYCEMIA, WITH LONG-TERM CURRENT USE OF INSULIN (HCC): Primary | Chronic | ICD-10-CM

## 2023-03-15 DIAGNOSIS — Z79.4 TYPE 2 DIABETES MELLITUS WITH HYPERGLYCEMIA, WITH LONG-TERM CURRENT USE OF INSULIN (HCC): Primary | Chronic | ICD-10-CM

## 2023-03-15 DIAGNOSIS — I10 ESSENTIAL HYPERTENSION: Chronic | ICD-10-CM

## 2023-03-15 DIAGNOSIS — E78.2 MIXED HYPERLIPIDEMIA: Chronic | ICD-10-CM

## 2023-03-15 NOTE — PROGRESS NOTES
Patient Progress Note      CC: DM   He is being seen today in office after over a year  He is here with his nephew     Referring Provider  No referring provider defined for this encounter  History of Present Illness:   Robbin Collazo is a 70 y o  male with a history of type 2 diabetes with long term use of insulin  Diabetes course has been stable  Complications of DM: neuropathy, right AKA, gastroparesis  He had an admission in January 2023 for toe amputation due to osteomyelitis  Denies any issues with his current regimen  Home glucose monitoring: are performed regularly, 4 times a day  He was checking more often when he was using the Jeannene Paula but needs new sensors  Does not have log or meter today  He reports this morning BG was 124 mg/dl  He states most readings are in the mid 100s mg/dl  Current regimen: Lantus 29 units QHS, Humalog scale only (150-224 = 4 units, 225-299 = 5 units, 300-374 = 6 units, 375-449 = 7 units, >450 = 8 units), Jardiance 25 mg daily, Janumet  mg BID  He has been doing Humalog after meals rather than before meals  compliant most of the time, denies any side effects from medications  Injects in: abdomen  Rotates sites: Yes  Hypoglycemic episodes: No, rare  H/o of hypoglycemia causing hospitalization or Intervention such as glucagon injection or ambulance call : No  Hypoglycemia symptoms: jitteriness, mood changes  Treatment of hypoglycemia: orange juice, glucose tablets     Medic alert tag: recommended: Yes     Diabetes education: No  Diet: 3 meals per day, 1-2 snacks per day  Timing of meals is predictable  Diabetic diet compliance: noncompliant some of the time; eats small portions  Activity: Daily activity is predictable: Yes  No routine exercise        Ophthamology: August 2022  Podiatry: foot exam UTD, September 2022     Has hypertension: on ACE inhibitor/ARB, compliant most of the time  Has hyperlipidemia: on statin   Thyroid disorders: No  History of pancreatitis: No    Patient Active Problem List   Diagnosis   • Chronic hepatitis C without hepatic coma ([place] [address] )   • Essential hypertension   • Hyperlipidemia associated with type 2 diabetes mellitus ([place] [address] )   • Methadone maintenance therapy patient ([place] [address] )   • Type 2 diabetes mellitus with diabetic polyneuropathy ([place] [address] )   • Diabetic ulcer of toe of left foot associated with type 2 diabetes mellitus, with bone involvement without evidence of necrosis ([facility])   • Bilateral lower extremity edema   • Positive depression screening   • Mononeuropathy, unspecified   • Mixed hyperlipidemia   • Proteinuria   • Vitamin D deficiency   • Type 2 diabetes mellitus, with long-term current use of insulin (HCC)   • Acute respiratory failure with hypoxia (HCC)   • Hematuria   • Aortic thrombus ([facility])   • Prolonged Q-T interval on ECG   • Empyema lung (HCC)   • S/P AKA (above knee amputation), right ([facility])   • Cervical radiculopathy   • Chronic bilateral low back pain   • Above-knee amputation of right lower extremity ([facility])   • Drug-induced constipation   • Moderate protein-calorie malnutrition ([facility])   • Chronic pain syndrome   • Chronic hyponatremia   • Depression   • History of COVID-19   • Pulmonary fibrosis (HCC)   • Foot drop, left   • PAD (peripheral artery disease) ([facility])   • Diabetic neuropathy ([facility])   • Ulcer of left foot, limited to breakdown of skin ([place] [address] )      Past Medical History:   Diagnosis Date   • Arthritis    • Diabetes mellitus ([place] [address] )    • DVT (deep venous thrombosis) ([facility])    • GERD (gastroesophageal reflux disease)    • History of COVID-19 2020    severe disease, intubated/ICU x 1 month   • History of drug abuse ([place] [address] )     heroin abuse, clean since 2003   • Hypercholesteremia    • Hypertension    • Ischemia of right lower extremity with suspected rhabdomyolysis 02/06/2021   • Left Hydropneumothorax 02/10/2021   • Methadone use    • Pneumonia due to COVID-19 virus 01/11/2021   • Subacute osteomyelitis of right femur ([place] [address] ) 2021   • Vascular disorder of lower extremity       Past Surgical History:   Procedure Laterality Date   • AMPUTATION ABOVE KNEE (AKA) Right 2021    Procedure: AMPUTATION ABOVE KNEE (AKA); Surgeon: Miguel Alcala MD;  [facility];  Service: Vascular   • AMPUTATION ABOVE KNEE (AKA) Right 2021    Procedure: AMPUTATION ABOVE KNEE (AKA) FORMALIZATION,  R AKA wound washout, wound closure;  Surgeon: Miguel Alcala MD;  [facility];  Service: Vascular   • ARTERIOGRAM Right 2021    Procedure: ARTERIOGRAM;  Surgeon: Ele Alvarez DO;  [facility];  Service: Vascular   • BRONCHOSCOPY     • IR CHEST TUBE PLACEMENT  02/10/2021   • IR LOWER EXTREMITY ANGIOGRAM  2021   • IR LOWER EXTREMITY ANGIOGRAM  2022   • WV AMPUTATION TOE METATARSOPHALANGEAL JOINT Left 2023    Procedure: AMPUTATION TOE- hallux;   Surgeon: Montserrat Neumann DPM;  Location: [facility];  Service: Podiatry   • WV Xochitl Speak 3RD+ ORD SLCTV ABDL PEL/LXTR [facility] Left 2022    Procedure: LEG AGRAM W/ INTERVENTION;  Surgeon: Maricarmen Valenzuela MD;  Location: [facility];  Service: Vascular   • THROMBECTOMY W/ EMBOLECTOMY Right 2021    Procedure: EMBOLECTOMY/THROMBECTOMY LOWER EXTREMITY;  Surgeon: Ele Alvarez DO;  [facility];  Service: Vascular      Family History   Problem Relation Age of Onset   • Diabetes Mother    • Hypertension Father    • Leukemia Father    • Acute lymphoblastic leukemia Father    • Cancer Sister      Social History     Tobacco Use   • Smoking status: Some Days     Packs/day: 0 25     Types: Cigarettes     Last attempt to quit: 2022     Years since quittin 2   • Smokeless tobacco: Never   Substance Use Topics   • Alcohol use: Not Currently     Allergies   Allergen Reactions   • Varenicline Rash         Current Outpatient Medications:   •  acetaminophen (TYLENOL) 325 mg tablet, Take 3 tablets (975 mg total) by mouth every 8 (eight) hours, Disp: , Rfl: 0  •  albuterol (PROVENTIL HFA,VENTOLIN HFA) 90 mcg/act inhaler, Inhale 2 puffs every 4 (four) hours as needed for wheezing, Disp: , Rfl: 0  •  aspirin 81 mg chewable tablet, Chew 81 mg daily, Disp: , Rfl:   •  atorvastatin (LIPITOR) 20 mg tablet, Take 1 tablet by mouth once daily, Disp: 90 tablet, Rfl: 0  •  Blood Glucose Monitoring Suppl (OneTouch Verio) w/Device KIT, Use to test blood sugars 3 times daily, Disp: 1 kit, Rfl: 0  •  cholecalciferol (VITAMIN D3) 1,000 units tablet, Take 2 tablets (2,000 Units total) by mouth daily, Disp: 12 tablet, Rfl: 0  •  Continuous Blood Gluc  (FreeStyle Ke 2 [place]) JOSEPH, Use as directed, Disp: 1 each, Rfl: 0  •  Continuous Blood Gluc Sensor (FreeStyle Ke 2 Sensor) MISC, Change every 14 days, Disp: 2 each, Rfl: 2  •  Diclofenac Sodium (VOLTAREN) 1 %, Apply 4 g topically 4 (four) times a day as needed (as needed for pain), Disp: 50 g, Rfl: 1  •  DULoxetine (CYMBALTA) 60 mg delayed release capsule, Take 1 capsule by mouth once daily at bedtime, Disp: 30 capsule, Rfl: 0  •  Eliquis 5 MG, Take 1 tablet by mouth twice daily, Disp: 180 tablet, Rfl: 0  •  famotidine (PEPCID) 20 mg tablet, Take 1 tablet (20 mg total) by mouth 2 (two) times a day, Disp: 30 tablet, Rfl: 8  •  ferrous sulfate 324 (65 Fe) mg, Take 1 tablet (324 mg total) by mouth daily before breakfast, Disp: 30 tablet, Rfl: 3  •  fluticasone (FLONASE) 50 mcg/act nasal spray, 1 spray into each nostril daily (Patient taking differently: 1 spray into each nostril if needed), Disp: 16 g, Rfl: 2  •  glucose blood test strip, Use 1 each 3 (three) times daily after meals Use as instructed, Disp: 270 each, Rfl: 3  •  insulin glargine (LANTUS) 100 units/mL subcutaneous injection, Inject 29 Units under the skin daily at bedtime, Disp: 10 mL, Rfl: 3  •  insulin lispro (HumaLOG KwikPen) 100 units/mL injection pen, Blood Glucose 150 - 224: 4 Unit of Insulin Blood Glucose 225 - 299: 5 Units of Insulin Blood Glucose 300 - 374: 6 Units of Insulin Blood Glucose 375 - 449: 7 Units of Insulin Blood Glucose greater than or equal to 450: 8 Units of Insulin Max of 24 units per day, Disp: 15 mL, Rfl: 3  •  Insulin Pen Needle 31G X 5 MM MISC, Use daily Pt to inject 4 X daily (Patient taking differently: Use Check sugars three times a day), Disp: 100 each, Rfl: 5  •  Insulin Pen Needle 31G X 5 MM MISC, 30 units sq 2X daily, Disp: 100 each, Rfl: 3  •  Insulin Syringe-Needle U-100 (BD Veo Insulin Syringe U/F) 31G X 15/64" 0 5 ML MISC, Inject under the skin 3 (three) times a day Use as directed (Patient taking differently: Inject under the skin 3 (three) times a day Check sugars three times a day), Disp: 300 each, Rfl: 5  •  Janumet  MG per tablet, TAKE 1 TABLET BY MOUTH TWICE DAILY WITH MEALS, Disp: 180 tablet, Rfl: 0  •  Jardiance 25 MG TABS, TAKE 1 TABLET BY MOUTH ONCE DAILY IN THE MORNING, Disp: 30 tablet, Rfl: 0  •  levocetirizine (XYZAL) 5 MG tablet, TAKE 1 TABLET BY MOUTH ONCE DAILY IN THE EVENING, Disp: 90 tablet, Rfl: 0  •  lidocaine (LMX) 4 % cream, Apply topically 4 (four) times a day as needed for mild pain, Disp: 30 g, Rfl: 0  •  lisinopril (ZESTRIL) 10 mg tablet, Take 1 tablet by mouth once daily, Disp: 90 tablet, Rfl: 0  •  magnesium oxide (MAG-OX) 400 mg, Take 1 tablet (400 mg total) by mouth daily, Disp: 90 tablet, Rfl: 1  •  menthol-methyl salicylate (BENGAY) 72-10 % cream, Apply topically 4 (four) times a day as needed (torticollis), Disp: , Rfl: 0  •  methadone (DOLOPHINE) 10 mg tablet, Take 70 mg by mouth daily,, Disp: , Rfl:   •  multivitamin-minerals (CENTRUM ADULTS) tablet, Take 1 tablet by mouth daily, Disp: , Rfl: 0  •  naloxone (NARCAN) 4 mg/0 1 mL nasal spray, Administer 1 spray into a nostril   If no response after 2-3 minutes, give another dose in the other nostril using a new spray , Disp: 1 each, Rfl: 1  •  nicotine (NICODERM CQ) 21 mg/24 hr TD 24 hr patch, Place 1 patch on the skin every 24 hours, Disp: 28 patch, Rfl: 0  •  ondansetron (ZOFRAN) 4 mg tablet, Take 1 tablet (4 mg total) by mouth every 8 (eight) hours as needed for nausea or vomiting, Disp: 40 tablet, Rfl: 2  •  pantoprazole (PROTONIX) 40 mg tablet, Take 1 tablet by mouth once daily, Disp: 30 tablet, Rfl: 0  •  silver sulfadiazine (SILVADENE,SSD) 1 % cream, Apply topically daily Apply as directed to left heel ulcer daily with each dressing change  (Patient taking differently: Apply topically if needed), Disp: 25 g, Rfl: 2  •  traZODone (DESYREL) 50 mg tablet, TAKE 1 TABLET BY MOUTH ONCE DAILY AT BEDTIME AS NEEDED FOR SLEEP, Disp: 30 tablet, Rfl: 0  Review of Systems   Constitutional: Positive for fatigue (mild)  Negative for activity change, appetite change and unexpected weight change  HENT: Negative for trouble swallowing  Eyes: Negative for visual disturbance  Respiratory: Negative for shortness of breath  Cardiovascular: Negative for chest pain and palpitations  Gastrointestinal: Positive for constipation  Negative for diarrhea  Endocrine: Negative for polydipsia and polyuria  Musculoskeletal: Negative  Skin: Positive for wound (left foot )  Neurological: Positive for numbness  Psychiatric/Behavioral: Negative  Physical Exam:  Body mass index is 26 3 kg/m²  /70   Pulse 100   Temp (!) 96 9 °F (36 1 °C) (Skin)   Ht 5' 8" (1 727 m)   BMI 26 30 kg/m²    Wt Readings from Last 3 Encounters:   02/08/23 78 5 kg (173 lb)   01/20/23 78 5 kg (173 lb)   01/12/23 78 5 kg (173 lb)       Physical Exam  Vitals and nursing note reviewed  Constitutional:       Appearance: He is well-developed  HENT:      Head: Normocephalic  Eyes:      General: No scleral icterus  Pupils: Pupils are equal, round, and reactive to light  Neck:      Thyroid: No thyromegaly  Cardiovascular:      Rate and Rhythm: Normal rate and regular rhythm        Pulses:           Radial pulses are 2+ on the right side and 2+ on the left side  Heart sounds: No murmur heard  Pulmonary:      Effort: Pulmonary effort is normal  No respiratory distress  Breath sounds: Normal breath sounds  No wheezing  Musculoskeletal:      Cervical back: Neck supple  Comments: Right AKA   Skin:     General: Skin is warm and dry  Neurological:      Mental Status: He is alert  Deep Tendon Reflexes: Reflexes are normal and symmetric  Patient's shoes and socks were not removed  Labs:   Component      Latest Ref Rng & Units 5/25/2022 1/27/2023   Sodium      135 - 147 mmol/L 135 (L) 135   Potassium      3 5 - 5 3 mmol/L 5 4 (H) 4 8   Chloride      96 - 108 mmol/L 101 101   CO2      21 - 32 mmol/L 28 30   Anion Gap      4 - 13 mmol/L 6 4   BUN      5 - 25 mg/dL 25 30 (H)   Creatinine      0 60 - 1 30 mg/dL 1 18 1 29   GLUCOSE FASTING      65 - 99 mg/dL 200 (H)    Calcium      8 3 - 10 1 mg/dL 9 9 9 7   AST      5 - 45 U/L 24    ALT      12 - 78 U/L 34    Alkaline Phosphatase      46 - 116 U/L 117 (H)    Total Protein      6 4 - 8 2 g/dL 8 9 (H)    Albumin      3 5 - 5 0 g/dL 3 5    TOTAL BILIRUBIN      0 20 - 1 00 mg/dL 0 62    eGFR      ml/min/1 73sq m 64 58   Glucose, Random      65 - 140 mg/dL  172 (H)   Cholesterol      See Comment mg/dL 114    Triglycerides      See Comment mg/dL 217 (H)    HDL      >=40 mg/dL 32 (L)    LDL Calculated      0 - 100 mg/dL 39    Non-HDL Cholesterol      mg/dl 82    EXT Creatinine Urine      mg/dL 72 5    MICROALBUM ,U,RANDOM      0 0 - 20 0 mg/L 89 3 (H)    MICROALBUMIN/CREATININE RATIO      0 - 30 mg/g creatinine 123 (H)    Hemoglobin A1C      Normal 3 8-5 6%; PreDiabetic 5 7-6 4%; Diabetic >=6 5%; Glycemic control for adults with diabetes <7 0% % 8 1 (H) 7 1 (H)   eAG, EST AVG Glucose      mg/dl 186 157       Plan:    Diagnoses and all orders for this visit:    Type 2 diabetes mellitus with hyperglycemia, with long-term current use of insulin (HCC)  HGA1C 7 1%  Improved  Treatment regimen: continue current treatment, send BG log / download CGM  Discussed intensive insulin regimen does increase risk for hypoglycemia  Episodes of hypoglycemia can lead to permanent disability and death  Discussed risks/complications associated with uncontrolled diabetes  Advised to adhere to diabetic diet, and recommended staying active/exercising routinely as tolerated  Keep carbohydrates consistent to limit blood glucose fluctuations  Advised to call if blood sugars less than 70 mg/dl or over 300 mg/dl  Check blood glucose 3+ times a day  Discussed symptoms and treatment of hypoglycemia  Discussed use of CGM to collect additional blood glucose data to reveal trends and patterns that can be used to optimize treatment plan  Recommended routine follow-up with podiatry and ophthalmology  Send log in 1-2 weeks  Ordered blood work to complete prior to next visit  -     Hemoglobin A1C; Future  -     Basic metabolic panel; Future  -     Microalbumin / creatinine urine ratio; Future  -     Continuous Blood Gluc Sensor (FreeStyle Ke 2 Sensor) MISC; Change every 14 days    Essential hypertension  Blood pressure adequately controlled, continue current treatment  -     Basic metabolic panel; Future    Mixed hyperlipidemia  LDL previously 39  Continue statin therapy  Managed by PCP          Discussed with the patient diagnosis and treatment and all questions fully answered  He will call me if any problems arise  Counseled patient on diagnostic results, prognosis, risk and benefit of treatment options, instruction for management, importance of treatment compliance, risk factor reduction and impressions        Annika Patel PA-C

## 2023-03-15 NOTE — PATIENT INSTRUCTIONS
Hypoglycemia instructions   Martir Martin  3/15/2023  [id number]    Low Blood Sugar    Steps to treat low blood sugar  1  Test blood sugar if you have symptoms of low blood sugar:   Low Blood Sugar Symptoms:  o Sweaty  o Dizzy  o Rapid heartbeat  o Shaky  o Bad mood  o Hungry      2  Treat blood sugar less than 70 with 15 grams of fast-acting carbohydrate:   Examples of 15 grams Fast-Acting Carbohydrate:  o 4 oz juice  o 4 oz regular soda  o 3-4 glucose tablets (chew)  o 3-4 hard candies (chew)          3  Wait 15 minutes and test your blood sugar again     4   If blood sugar is less than 100, repeat steps 2-3     5  When your blood sugar is 100 or more, eat a snack if it will be longer than one hour until your next meal  The snack should be 15 grams of carbohydrate and a protein:   Examples of snacks:  o ½ sandwich  o 6 crackers with cheese  o Piece of fruit with cheese or peanut butter  o 6 crackers with peanut butter

## 2023-03-16 ENCOUNTER — HOME CARE VISIT (OUTPATIENT)
Dept: HOME HEALTH SERVICES | Facility: HOME HEALTHCARE | Age: 63
End: 2023-03-16

## 2023-03-20 VITALS
RESPIRATION RATE: 20 BRPM | DIASTOLIC BLOOD PRESSURE: 80 MMHG | OXYGEN SATURATION: 93 % | SYSTOLIC BLOOD PRESSURE: 124 MMHG | HEART RATE: 88 BPM | TEMPERATURE: 97.2 F

## 2023-03-20 DIAGNOSIS — E11.65 TYPE 2 DIABETES MELLITUS WITH HYPERGLYCEMIA, WITH LONG-TERM CURRENT USE OF INSULIN (HCC): ICD-10-CM

## 2023-03-20 DIAGNOSIS — Z79.4 TYPE 2 DIABETES MELLITUS WITH HYPERGLYCEMIA, WITH LONG-TERM CURRENT USE OF INSULIN (HCC): ICD-10-CM

## 2023-03-20 DIAGNOSIS — I74.10 AORTIC THROMBUS (HCC): ICD-10-CM

## 2023-03-20 RX ORDER — SYRING-NEEDL,DISP,INSUL,0.3 ML 31GX15/64"
SYRINGE, EMPTY DISPOSABLE MISCELLANEOUS 3 TIMES DAILY
Qty: 300 EACH | Refills: 5 | Status: SHIPPED | OUTPATIENT
Start: 2023-03-20

## 2023-03-21 ENCOUNTER — OFFICE VISIT (OUTPATIENT)
Dept: WOUND CARE | Facility: CLINIC | Age: 63
End: 2023-03-21

## 2023-03-21 ENCOUNTER — TELEPHONE (OUTPATIENT)
Dept: ENDOCRINOLOGY | Facility: CLINIC | Age: 63
End: 2023-03-21

## 2023-03-21 VITALS
DIASTOLIC BLOOD PRESSURE: 78 MMHG | RESPIRATION RATE: 16 BRPM | HEART RATE: 100 BPM | TEMPERATURE: 97 F | SYSTOLIC BLOOD PRESSURE: 132 MMHG

## 2023-03-21 DIAGNOSIS — L97.421 DIABETIC ULCER OF LEFT MIDFOOT ASSOCIATED WITH DIABETES MELLITUS DUE TO UNDERLYING CONDITION, LIMITED TO BREAKDOWN OF SKIN (HCC): Primary | ICD-10-CM

## 2023-03-21 DIAGNOSIS — E08.621 DIABETIC ULCER OF LEFT MIDFOOT ASSOCIATED WITH DIABETES MELLITUS DUE TO UNDERLYING CONDITION, LIMITED TO BREAKDOWN OF SKIN (HCC): Primary | ICD-10-CM

## 2023-03-21 RX ORDER — LANOLIN ALCOHOL/MO/W.PET/CERES
CREAM (GRAM) TOPICAL
COMMUNITY
Start: 2023-02-20 | End: 2023-03-21 | Stop reason: SDUPTHER

## 2023-03-21 NOTE — PROGRESS NOTES
Assessment/Plan:      Diagnoses and all orders for this visit:    Diabetic ulcer of left midfoot associated with diabetes mellitus due to underlying condition, limited to breakdown of skin ([address] )  -     Wound off loading; Future    Other orders  -     Discontinue: magnesium Oxide (MAG-OX) 400 mg TABS      - continue daily pedal checks  - RTC prn  - healed  - Continue lotioning, continue offloading  - Will see in office for at risk foot care  Subjective:     Patient ID: Jose Bojorquez is a 70 y o  male  Patient presents for evaluation and management of his right foot  States that he is having no drainage, no pain, he is in process with getting his diabetic shoes  Review of Systems   Constitutional: Negative for chills and fever  HENT: Negative for ear pain and sore throat  Eyes: Negative for pain and visual disturbance  Respiratory: Negative for cough and shortness of breath  Cardiovascular: Negative for chest pain and palpitations  Gastrointestinal: Negative for abdominal pain and vomiting  Genitourinary: Negative for dysuria and hematuria  Musculoskeletal: Negative for arthralgias and back pain  Skin: Negative for color change and rash  Neurological: Negative for seizures and syncope  All other systems reviewed and are negative  Objective:     Physical Exam  Vitals reviewed  Constitutional:       Appearance: Normal appearance  HENT:      Head: Normocephalic and atraumatic  Nose: Nose normal       Mouth/Throat:      Mouth: Mucous membranes are moist    Eyes:      Pupils: Pupils are equal, round, and reactive to light  Musculoskeletal:      Comments: Ulcerations are healed, no drainage  Skin:     Capillary Refill: Capillary refill takes 2 to 3 seconds  Neurological:      General: No focal deficit present  Mental Status: He is alert and oriented to person, place, and time  Mental status is at baseline

## 2023-03-21 NOTE — PATIENT INSTRUCTIONS
Orders Placed This Encounter   Procedures    Wound off loading     Keep healed wound site covered for 1 week with silicone bordered gauze  Follow up with podiatry for nail care       Standing Status:   Future     Standing Expiration Date:   3/21/2024

## 2023-03-23 ENCOUNTER — HOME CARE VISIT (OUTPATIENT)
Dept: HOME HEALTH SERVICES | Facility: HOME HEALTHCARE | Age: 63
End: 2023-03-23

## 2023-03-23 VITALS
DIASTOLIC BLOOD PRESSURE: 80 MMHG | OXYGEN SATURATION: 94 % | SYSTOLIC BLOOD PRESSURE: 144 MMHG | TEMPERATURE: 97 F | HEART RATE: 80 BPM | RESPIRATION RATE: 20 BRPM

## 2023-03-24 ENCOUNTER — TELEPHONE (OUTPATIENT)
Dept: PODIATRY | Facility: CLINIC | Age: 63
End: 2023-03-24

## 2023-03-24 NOTE — TELEPHONE ENCOUNTER
Caller: Ag     Doctor/Office: Dr Aysha Leija    CB#: [phone]    Escalation: Care Carlos Lopez from [facility]  calling on behalf of patient Reinier Field [birth year]  Would like a script for diabetic shoes along w most recent foot exam/notes faxed to [phone]  Please call with any questions  Thank you

## 2023-03-30 ENCOUNTER — HOME CARE VISIT (OUTPATIENT)
Dept: HOME HEALTH SERVICES | Facility: HOME HEALTHCARE | Age: 63
End: 2023-03-30

## 2023-03-30 VITALS
TEMPERATURE: 97.1 F | DIASTOLIC BLOOD PRESSURE: 70 MMHG | RESPIRATION RATE: 20 BRPM | SYSTOLIC BLOOD PRESSURE: 138 MMHG | OXYGEN SATURATION: 95 % | HEART RATE: 72 BPM

## 2023-04-01 NOTE — PROGRESS NOTES
Patient Progress Note      CC: DM2      Referring Provider  Emma Tillman, [address] [facility] [address], [address]     History of Present Illness:    Parish Doran is a 56 y o  male with a history of type 2 diabetes with long term use of insulin  Diabetes course has been stable  Complications of DM: neuropathy  Reports he was recently sick with a GI illness  Denies any issues with his current regimen  Home glucose monitoring: are performed regularly, 2-3 times a day    Does not have log or meter today    Current regimen:  Metformin 1000 mg twice a day, Humulin R U500 60 units with breakfast, 70 units with dinner, 20 units with snack when working night shift (not always using 20 units with snack)  compliant most of the time, denies any side effects from medications  Injects in: abdomen  Rotates sites: Yes  Hypoglycemic episodes: Yes, rare  Usually drops if he takes insulin and does not eat adequate carbohydrates  H/o of hypoglycemia causing hospitalization or Intervention such as glucagon injection  or ambulance call :  No  Hypoglycemia symptoms: headache  Treatment of hypoglycemia: orange juice, glucose tablets    Medic alert tag: recommended: Yes    Diabetes education: No  Diet: 3 meals per day, 2 snacks per day  Timing of meals is predictable  Diabetic diet compliance:  noncompliant much of the time  Activity: Daily activity is predictable: Yes  No routine exercise  Is active at work               Further diabetic ROS: no chest pain, dyspnea    Ophthamology: overdue  Podiatry: foot exam UTD, Sept 2019    Has hypertension: on ACE inhibitor/ARB, compliant most of the time  Has hyperlipidemia: on statin - tolerating well, no myalgias  compliant most of the time, denies any side effects from medications    Thyroid disorders: No  History of pancreatitis: No    Patient Active Problem List   Diagnosis    Chronic hepatitis C without hepatic coma ([facility] )    Essential hypertension    Fatigue due to exposure    Hyperlipidemia associated with type 2 diabetes mellitus ([facility] )    Methadone maintenance therapy patient ([place] [address] )    Type 2 diabetes mellitus with diabetic polyneuropathy (HCC)    Non-intractable cyclical vomiting with nausea    Callous ulcer, limited to breakdown of skin ([place] [address] )    Bilateral lower extremity edema    Obesity (BMI 30-39  9)    Positive depression screening    Mononeuropathy, unspecified    Other and unspecified noninfectious gastroenteritis and colitis    Mixed hyperlipidemia    Proteinuria    Tobacco abuse counseling    Vitamin D deficiency    Annual physical exam      History reviewed  No pertinent past medical history  History reviewed  No pertinent surgical history     Family History   Problem Relation Age of Onset    Diabetes Mother     Hypertension Father     Leukemia Father     Acute lymphoblastic leukemia Father     Cancer Sister      Social History     Tobacco Use    Smoking status: Former Smoker    Smokeless tobacco: Never Used   Substance Use Topics    Alcohol use: No     Allergies   Allergen Reactions    Varenicline          Current Outpatient Medications:     Blood Glucose Monitoring Suppl (Brianna Breath) w/Device KIT, Use to test blood sugars 3 times daily, Disp: 1 kit, Rfl: 0    ergocalciferol (VITAMIN D2) 50,000 units, Take 1 capsule (50,000 Units total) by mouth once a week, Disp: 8 capsule, Rfl: 0    erythromycin (ILOTYCIN) ophthalmic ointment, 0 5 inches daily at bedtime, Disp: , Rfl:     furosemide (LASIX) 40 mg tablet, Take 1 tablet (40 mg total) by mouth daily, Disp: 90 tablet, Rfl: 3    gabapentin (NEURONTIN) 100 mg capsule, Take 1 capsule (100 mg total) by mouth 3 (three) times a day, Disp: 90 capsule, Rfl: 3    glucose blood (ONETOUCH VERIO) test strip, Use to test blood sugars 3 times daily, Disp: 300 each, Rfl: 1    Insulin Pen Needle 31G X 5 MM MISC, by Does not apply route daily Pt to inject 4 X daily, Disp: 100 each, Rfl: 5    Insulin Regular Human, Conc, (HUMULIN R U-500 KWIKPEN) 500 units/mL CONCENTRATED U-500 injection pen, Inject 60 units with breakfast and 70 units with dinner  20 units with 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, Disp: 4 pen, Rfl: 3    isosorbide mononitrate (IMDUR) 120 mg 24 hr tablet, Take 1 tablet (120 mg total) by mouth daily, Disp: 90 tablet, Rfl: 3    lisinopril-hydrochlorothiazide (PRINZIDE,ZESTORETIC) 20-25 MG per tablet, Take 1 tablet by mouth daily, Disp: 30 tablet, Rfl: 3    metFORMIN (GLUCOPHAGE-XR) 500 mg 24 hr tablet, Take 2 tablets (1,000 mg total) by mouth 2 (two) times a day with meals, Disp: 60 tablet, Rfl: 0    methadone (DOLOPHINE) 10 MG/5ML solution, Take 70 mg by mouth, Disp: , Rfl:     omeprazole (PriLOSEC) 20 mg delayed release capsule, Take 1 capsule (20 mg total) by mouth daily, Disp: 90 capsule, Rfl: 3    pravastatin (PRAVACHOL) 40 mg tablet, Take 1 tablet (40 mg total) by mouth daily, Disp: 90 tablet, Rfl: 3    Empagliflozin (Jardiance) 25 MG TABS, Take 1 tablet (25 mg total) by mouth every morning, Disp: 30 tablet, Rfl: 5    Insulin Pen Needle 31G X 5 MM MISC, 30 units sq 2X daily, Disp: 100 each, Rfl: 3    potassium chloride (K-DUR,KLOR-CON) 20 mEq tablet, Take 2 tablets (40 mEq total) by mouth daily, Disp: 180 tablet, Rfl: 3  Review of Systems   Constitutional: Positive for fatigue (mild)  Negative for activity change, appetite change and unexpected weight change  HENT: Negative for trouble swallowing  Eyes: Negative for visual disturbance  Respiratory: Negative for shortness of breath  Cardiovascular: Negative for chest pain and palpitations  Gastrointestinal: Positive for diarrhea (past couple days)  Negative for constipation  Endocrine: Positive for polydipsia and polyuria  Musculoskeletal: Positive for arthralgias  Skin: Negative for wound  Neurological: Positive for numbness  Psychiatric/Behavioral: Negative  Physical Exam:  Body mass index is 31 63 kg/m²    /82 Pulse 103   Ht 5' 8" (1 727 m)   Wt 94 3 kg (208 lb)   BMI 31 63 kg/m²    Wt Readings from Last 3 Encounters:   03/12/20 94 3 kg (208 lb)   03/12/20 95 3 kg (210 lb)   01/30/20 93 kg (205 lb)       Physical Exam   Constitutional: He appears well-developed and well-nourished  HENT:   Head: Normocephalic  Eyes: Pupils are equal, round, and reactive to light  EOM are normal  No scleral icterus  Neck: Neck supple  No thyromegaly present  Cardiovascular: Normal rate and regular rhythm  No murmur heard  Pulmonary/Chest: Effort normal and breath sounds normal  No respiratory distress  He has no wheezes  Neurological: He is alert  He has normal reflexes  Skin: Skin is warm and dry  Psychiatric: He has a normal mood and affect  Nursing note and vitals reviewed  Patient's shoes and socks were not removed  Labs:   Component      Latest Ref Rng & Units 11/4/2019 11/26/2019   Sodium      136 - 145 mmol/L  136   Potassium      3 5 - 5 3 mmol/L  4 1   Chloride      100 - 108 mmol/L  104   CO2      21 - 32 mmol/L  26   Anion Gap      4 - 13 mmol/L  6   BUN      5 - 25 mg/dL  19   Creatinine      0 60 - 1 30 mg/dL  1 20   GLUCOSE FASTING      65 - 99 mg/dL  242 (H)   Calcium      8 3 - 10 1 mg/dL  9 6   AST      5 - 45 U/L     ALT      12 - 78 U/L     Alkaline Phosphatase      46 - 116 U/L     Total Protein      6 4 - 8 2 g/dL     Albumin      3 5 - 5 0 g/dL     TOTAL BILIRUBIN      0 20 - 1 00 mg/dL     eGFR      ml/min/1 73sq m  65   Cholesterol      50 - 200 mg/dL  169   Triglycerides      <=150 mg/dL  206 (H)   HDL      >=40 mg/dL  33 (L)   LDL Direct      0 - 100 mg/dL  95   Non-HDL Cholesterol      mg/dl  136   EXT Creatinine Urine      mg/dL  449 0   MICROALBUM ,U,RANDOM      0 0 - 20 0 mg/L  1,750 0 (H)   MICROALBUMIN/CREATININE RATIO      0 - 30 mg/g creatinine  390 (H)   Hemoglobin A1C      Normal 3 8-5 6%; PreDiabetic 5 7-6 4%;  Diabetic >=6 5%; Glycemic control for adults with diabetes <7 0% % 10 7 (H) 11 5 (H)   eAG, EST AVG Glucose      mg/dl 260 283   C-PEPTIDE      1 1 - 4 4 ng/mL  2 1   Vit D, 25-Hydroxy      30 0 - 100 0 ng/mL  8 2 (L)   TSH 3RD GENERATON      0 358 - 3 740 uIU/mL       Component      Latest Ref Rng & Units 12/10/2019 1/30/2020 3/4/2020   Sodium      136 - 145 mmol/L   138   Potassium      3 5 - 5 3 mmol/L   4 5   Chloride      100 - 108 mmol/L   103   CO2      21 - 32 mmol/L   28   Anion Gap      4 - 13 mmol/L   7   BUN      5 - 25 mg/dL   15   Creatinine      0 60 - 1 30 mg/dL   1 01   GLUCOSE FASTING      65 - 99 mg/dL   332 (H)   Calcium      8 3 - 10 1 mg/dL   9 2   AST      5 - 45 U/L   21   ALT      12 - 78 U/L   28   Alkaline Phosphatase      46 - 116 U/L   152 (H)   Total Protein      6 4 - 8 2 g/dL   8 4 (H)   Albumin      3 5 - 5 0 g/dL   3 5   TOTAL BILIRUBIN      0 20 - 1 00 mg/dL   0 72   eGFR      ml/min/1 73sq m   80   Cholesterol      50 - 200 mg/dL   155   Triglycerides      <=150 mg/dL   249 (H)   HDL      >=40 mg/dL   33 (L)   LDL Direct      0 - 100 mg/dL   72   Non-HDL Cholesterol      mg/dl   122   EXT Creatinine Urine      mg/dL   111 0   MICROALBUM ,U,RANDOM      0 0 - 20 0 mg/L   758 0 (H)   MICROALBUMIN/CREATININE RATIO      0 - 30 mg/g creatinine   683 (H)   Hemoglobin A1C      Normal 3 8-5 6%; PreDiabetic 5 7-6 4%; Diabetic >=6 5%; Glycemic control for adults with diabetes <7 0% % 12 4 (A) 10 9 (A) 11 5 (H)   eAG, EST AVG Glucose      mg/dl   283   C-PEPTIDE      1 1 - 4 4 ng/mL      TSH 3RD GENERATON      0 358 - 3 740 uIU/mL   2 160          Plan:    Diagnoses and all orders for this visit:    Type 2 diabetes mellitus with diabetic polyneuropathy, with long-term current use of insulin ([address] )  HGA1C 11 5%  Worsened  Treatment regimen:  Unable to make changes to insulin regimen as patient did not bring blood sugar log today  Start Jardiance 25 mg daily  Discussed side effects (UTI, yeast infection, etc), safety/efficacy  Hydrate well  Continue metformin 1000 mg twice a day  Send log in 1-2 weeks  Discussed intensive insulin regimen does increase risk for hypoglycemia  Episodes of hypoglycemia can lead to permanent disability and death  Discussed risks/complications associated with uncontrolled diabetes  Advised to adhere to diabetic diet, and recommended staying active/exercising routinely as tolerated  Keep carbohydrates consistent to limit blood glucose fluctuations  Advised to call if blood sugars less than 70 mg/dl or over 300 mg/dl  Check blood glucose 3+ times a day  Discussed symptoms and treatment of hypoglycemia  Referred to diabetes/nutrition education  Recommended routine follow-up with podiatry and ophthalmology  Send log in 1-2 weeks  Ordered blood work to complete prior to next visit  -     Hemoglobin A1C; Future  -     Basic metabolic panel; Future  -     Ambulatory Referral to Ophthalmology; Future  -     metFORMIN (GLUCOPHAGE-XR) 500 mg 24 hr tablet; Take 2 tablets (1,000 mg total) by mouth 2 (two) times a day with meals  -     Empagliflozin (Jardiance) 25 MG TABS; Take 1 tablet (25 mg total) by mouth every morning  -     Ambulatory referral to Diabetic Education; Future    Essential hypertension  Blood pressure elevated  Advised to monitor blood pressure at home and follow-up with PCP if remaining elevated, systolic at or above 738 and diastolic at or above 90  For now continue current treatment  -     Basic metabolic panel; Future    Mixed hyperlipidemia  LDL previously 72  Continue statin therapy  Managed by PCP       Discussed with the patient diagnosis and treatment and all questions fully answered  He will call me if any problems arise  Counseled patient on diagnostic results, prognosis, risk and benefit of treatment options, instruction for management, importance of treatment compliance, risk factor reduction and impressions        Annika Patel PA-C 18

## 2023-04-05 ENCOUNTER — HOSPITAL ENCOUNTER (OUTPATIENT)
Dept: NON INVASIVE DIAGNOSTICS | Facility: HOSPITAL | Age: 63
Discharge: HOME | End: 2023-04-05

## 2023-04-05 DIAGNOSIS — I73.9 PAD (PERIPHERAL ARTERY DISEASE) (HCC): ICD-10-CM

## 2023-05-04 DIAGNOSIS — I74.10 AORTIC THROMBUS (HCC): ICD-10-CM

## 2023-05-05 RX ORDER — APIXABAN 5 MG/1
TABLET, FILM COATED ORAL
Qty: 180 TABLET | Refills: 0 | Status: SHIPPED | OUTPATIENT
Start: 2023-05-05

## 2023-05-10 ENCOUNTER — RA CDI HCC (OUTPATIENT)
Dept: OTHER | Facility: HOSPITAL | Age: 63
End: 2023-05-10

## 2023-05-10 NOTE — PROGRESS NOTES
[facility]  coding opportunities     E11 22, E11 42 and E11 69     Chart Reviewed number of suggestions sent to Provider: 3     Patients Insurance     Medicare Insurance: [employer]

## 2023-05-15 DIAGNOSIS — Z89.612 STATUS POST BILATERAL ABOVE KNEE AMPUTATION (HCC): ICD-10-CM

## 2023-05-15 DIAGNOSIS — Z79.4 TYPE 2 DIABETES MELLITUS WITH HYPERGLYCEMIA, WITH LONG-TERM CURRENT USE OF INSULIN (HCC): Primary | Chronic | ICD-10-CM

## 2023-05-15 DIAGNOSIS — S78.111A ABOVE-KNEE AMPUTATION OF RIGHT LOWER EXTREMITY (HCC): ICD-10-CM

## 2023-05-15 DIAGNOSIS — E11.65 TYPE 2 DIABETES MELLITUS WITH HYPERGLYCEMIA, WITH LONG-TERM CURRENT USE OF INSULIN (HCC): Primary | Chronic | ICD-10-CM

## 2023-05-15 DIAGNOSIS — Z89.611 STATUS POST BILATERAL ABOVE KNEE AMPUTATION (HCC): ICD-10-CM

## 2023-05-25 DIAGNOSIS — E11.42 TYPE 2 DIABETES MELLITUS WITH DIABETIC POLYNEUROPATHY, WITH LONG-TERM CURRENT USE OF INSULIN (HCC): ICD-10-CM

## 2023-05-25 DIAGNOSIS — I74.10 AORTIC THROMBUS (HCC): ICD-10-CM

## 2023-05-25 DIAGNOSIS — Z79.4 TYPE 2 DIABETES MELLITUS WITH DIABETIC POLYNEUROPATHY, WITH LONG-TERM CURRENT USE OF INSULIN (HCC): ICD-10-CM

## 2023-05-25 DIAGNOSIS — Z79.4 TYPE 2 DIABETES MELLITUS WITH HYPERGLYCEMIA, WITH LONG-TERM CURRENT USE OF INSULIN (HCC): Chronic | ICD-10-CM

## 2023-05-25 DIAGNOSIS — E11.65 TYPE 2 DIABETES MELLITUS WITH HYPERGLYCEMIA, WITH LONG-TERM CURRENT USE OF INSULIN (HCC): Chronic | ICD-10-CM

## 2023-05-25 DIAGNOSIS — G89.4 CHRONIC PAIN SYNDROME: ICD-10-CM

## 2023-05-25 DIAGNOSIS — S78.111A ABOVE-KNEE AMPUTATION OF RIGHT LOWER EXTREMITY (HCC): ICD-10-CM

## 2023-05-25 RX ORDER — LISINOPRIL 10 MG/1
TABLET ORAL
Qty: 90 TABLET | Refills: 0 | Status: SHIPPED | OUTPATIENT
Start: 2023-05-25

## 2023-05-25 RX ORDER — INSULIN GLARGINE 100 [IU]/ML
INJECTION, SOLUTION SUBCUTANEOUS
Qty: 10 ML | Refills: 0 | Status: SHIPPED | OUTPATIENT
Start: 2023-05-25

## 2023-05-25 RX ORDER — DULOXETIN HYDROCHLORIDE 60 MG/1
CAPSULE, DELAYED RELEASE ORAL
Qty: 30 CAPSULE | Refills: 0 | Status: SHIPPED | OUTPATIENT
Start: 2023-05-25

## 2023-05-25 NOTE — TELEPHONE ENCOUNTER
Cymbalta prescription sent to patient's pharmacy  He will need a follow-up visit before next refill

## 2023-06-01 ENCOUNTER — TELEPHONE (OUTPATIENT)
Dept: FAMILY MEDICINE CLINIC | Facility: CLINIC | Age: 63
End: 2023-06-01

## 2023-06-01 NOTE — TELEPHONE ENCOUNTER
I spoke to pts nephew in regards to his medication semglee not being covered and pts nephew will call jordana to find out what is covered and give the office a call back

## 2023-06-02 ENCOUNTER — TELEPHONE (OUTPATIENT)
Dept: FAMILY MEDICINE CLINIC | Facility: CLINIC | Age: 63
End: 2023-06-02

## 2023-06-02 NOTE — TELEPHONE ENCOUNTER
Patient's nephew called stating there's only  2 medications comparable to semglee and none of them are covered by insurance   He was told he needs a prior authorization stating medical necessity for this medication

## 2023-06-08 ENCOUNTER — TELEPHONE (OUTPATIENT)
Dept: OBGYN CLINIC | Facility: HOSPITAL | Age: 63
End: 2023-06-08

## 2023-06-08 NOTE — TELEPHONE ENCOUNTER
Caller: Patient    Doctor: Robyn Sadler    Reason for call: Received a call from Ortho   Advised nothing noted from Ortho    Call back#: [phone]

## 2023-06-09 ENCOUNTER — APPOINTMENT (OUTPATIENT)
Dept: LAB | Facility: CLINIC | Age: 63
End: 2023-06-09
Payer: MEDICARE

## 2023-06-09 ENCOUNTER — OFFICE VISIT (OUTPATIENT)
Dept: PAIN MEDICINE | Facility: CLINIC | Age: 63
End: 2023-06-09
Payer: MEDICARE

## 2023-06-09 VITALS
DIASTOLIC BLOOD PRESSURE: 76 MMHG | HEART RATE: 92 BPM | SYSTOLIC BLOOD PRESSURE: 128 MMHG | BODY MASS INDEX: 26.3 KG/M2 | HEIGHT: 68 IN

## 2023-06-09 DIAGNOSIS — E61.1 IRON DEFICIENCY: ICD-10-CM

## 2023-06-09 DIAGNOSIS — J30.2 SEASONAL ALLERGIES: ICD-10-CM

## 2023-06-09 DIAGNOSIS — E11.65 TYPE 2 DIABETES MELLITUS WITH HYPERGLYCEMIA, WITH LONG-TERM CURRENT USE OF INSULIN (HCC): Chronic | ICD-10-CM

## 2023-06-09 DIAGNOSIS — Z79.4 TYPE 2 DIABETES MELLITUS WITH HYPERGLYCEMIA, WITH LONG-TERM CURRENT USE OF INSULIN (HCC): Chronic | ICD-10-CM

## 2023-06-09 DIAGNOSIS — Z79.4 TYPE 2 DIABETES MELLITUS WITH DIABETIC POLYNEUROPATHY, WITH LONG-TERM CURRENT USE OF INSULIN (HCC): ICD-10-CM

## 2023-06-09 DIAGNOSIS — S78.111A ABOVE-KNEE AMPUTATION OF RIGHT LOWER EXTREMITY (HCC): ICD-10-CM

## 2023-06-09 DIAGNOSIS — G89.4 CHRONIC PAIN SYNDROME: Primary | ICD-10-CM

## 2023-06-09 DIAGNOSIS — E11.42 TYPE 2 DIABETES MELLITUS WITH DIABETIC POLYNEUROPATHY, WITH LONG-TERM CURRENT USE OF INSULIN (HCC): ICD-10-CM

## 2023-06-09 DIAGNOSIS — E11.42 TYPE 2 DIABETES MELLITUS WITH DIABETIC POLYNEUROPATHY, UNSPECIFIED WHETHER LONG TERM INSULIN USE (HCC): ICD-10-CM

## 2023-06-09 PROCEDURE — 82728 ASSAY OF FERRITIN: CPT

## 2023-06-09 PROCEDURE — 83036 HEMOGLOBIN GLYCOSYLATED A1C: CPT

## 2023-06-09 PROCEDURE — 83550 IRON BINDING TEST: CPT

## 2023-06-09 PROCEDURE — 80053 COMPREHEN METABOLIC PANEL: CPT

## 2023-06-09 PROCEDURE — 99214 OFFICE O/P EST MOD 30 MIN: CPT | Performed by: NURSE PRACTITIONER

## 2023-06-09 PROCEDURE — 36415 COLL VENOUS BLD VENIPUNCTURE: CPT

## 2023-06-09 PROCEDURE — 83540 ASSAY OF IRON: CPT

## 2023-06-09 RX ORDER — LEVOCETIRIZINE DIHYDROCHLORIDE 5 MG/1
TABLET, FILM COATED ORAL
Qty: 90 TABLET | Refills: 0 | Status: SHIPPED | OUTPATIENT
Start: 2023-06-09

## 2023-06-09 RX ORDER — INSULIN GLARGINE-YFGN 100 [IU]/ML
INJECTION, SOLUTION SUBCUTANEOUS
COMMUNITY
Start: 2023-05-25

## 2023-06-09 RX ORDER — DULOXETIN HYDROCHLORIDE 60 MG/1
60 CAPSULE, DELAYED RELEASE ORAL
Qty: 90 CAPSULE | Refills: 1 | Status: SHIPPED | OUTPATIENT
Start: 2023-06-09

## 2023-06-09 NOTE — PROGRESS NOTES
Assessment:  1  Chronic pain syndrome    2  Above-knee amputation of right lower extremity ([facility] [address] )    3  Type 2 diabetes mellitus with diabetic polyneuropathy, unspecified whether long term insulin use ([facility] )    4  Type 2 diabetes mellitus with diabetic polyneuropathy, with long-term current use of insulin ([facility] )        Plan:  While the patient was in the office today, I did have a thorough conversation regarding their chronic pain syndrome, medication management, and treatment plan options  Patient is being seen for a follow-up visit  He reports that his pain is reasonably controlled with the use of Cymbalta 60 mg daily  This medication reduces his pain by at least 50%  He denies side effects from Cymbalta  Renewed Cymbalta 60 mg daily  A 90-day supply of this medication was sent to his pharmacy with 1 refill  Patient is planning to ask his PCP to take over prescription refills for Cymbalta  If she is willing to take over Cymbalta prescription, we will follow-up with him as needed  Otherwise, we will follow-up with him in 6 months  History of Present Illness: The patient is a 70 y o  male who presents for a follow up office visit in regards to Shoulder Pain, Arm Pain, and Buttocks Pain  The patient’s current symptoms include complaints of phantom limb pain right lower extremity  Current pain level is a 0/10  Pain can go up to 8/10 at times  He describes pain as intermittent and sharp  Current pain medications includes: Cymbalta 60 mg daily  The patient reports that this regimen is providing 50% pain relief  The patient is reporting no side effects from this pain medication regimen  I have personally reviewed and/or updated the patient's past medical history, past surgical history, family history, social history, current medications, allergies, and vital signs today  Review of Systems  Review of Systems   Constitutional: Negative for unexpected weight change     HENT: Negative for hearing loss  Eyes: Negative for visual disturbance  Respiratory: Negative for shortness of breath  Cardiovascular: Negative for leg swelling  Gastrointestinal: Positive for nausea and vomiting  Negative for constipation  Endocrine: Negative for polyuria  Genitourinary: Negative for difficulty urinating  Musculoskeletal: Positive for arthralgias, gait problem and myalgias  Negative for joint swelling  Decreased range of motion  Pain in extremity   Skin: Negative for rash  Neurological: Negative for weakness and headaches  Memory loss   Psychiatric/Behavioral: Negative for decreased concentration  All other systems reviewed and are negative  Past Medical History:   Diagnosis Date   • Arthritis    • Diabetes mellitus ([place] [address] )    • D[facility] (deep venous thrombosis) ([facility])    • GERD (gastroesophageal reflux disease)    • History of COVID-19 2020    severe disease, intubated/ICU x 1 month   • History of drug abuse ([place] [address] )     heroin abuse, clean since 2003   • Hypercholesteremia    • Hypertension    • Ischemia of right lower extremity with suspected rhabdomyolysis 02/06/2021   • Left Hydropneumothorax 02/10/2021   • Methadone use    • Pneumonia due to COVID-19 virus 01/11/2021   • Subacute osteomyelitis of right femur ([place] [address] ) 09/13/2021   • Vascular disorder of lower extremity        Past Surgical History:   Procedure Laterality Date   • AMPUTATION ABOVE KNEE (AKA) Right 01/14/2021    Procedure: AMPUTATION ABOVE KNEE (AKA);   Surgeon: Caty Hyman MD;  [facility]: [facility];  Service: Vascular   • AMPUTATION ABOVE KNEE (AKA) Right 01/18/2021    Procedure: AMPUTATION ABOVE KNEE (AKA) FORMALIZATION,  R AKA wound washout, wound closure;  Surgeon: Caty Hyman MD;  Location: [facility];  Service: Vascular   • ARTERIOGRAM Right 01/13/2021    Procedure: ARTERIOGRAM;  Surgeon: Tamera Morrison DO;  [facility]: [facility];  Service: Vascular   • BRONCHOSCOPY     • IR CHEST TUBE PLACEMENT  02/10/2021 • IR LOWER EXTREMITY ANGIOGRAM  2021   • IR LOWER EXTREMITY ANGIOGRAM  2022   • [place] AMPUTATION TOE METATARSOPHALANGEAL JOINT Left 2023    Procedure: AMPUTATION TOE- hallux;   Surgeon: Felecia Sosa DPM;  Location: [facility];  Service: Podiatry   • [place] Leana Ovalles 3RD+ ORD SLCTV ABDL PEL/LXTR [facility] Left 2022    Procedure: LEG AGRAM W/ INTERVENTION;  Surgeon: Yosef Nettles MD;  Location: [facility];  Service: Vascular   • THROMBECTOMY W/ EMBOLECTOMY Right 2021    Procedure: EMBOLECTOMY/THROMBECTOMY LOWER EXTREMITY;  Surgeon: Amor Haywood DO;  [facility]: [facility];  Service: Vascular       Family History   Problem Relation Age of Onset   • Diabetes Mother    • Hypertension Father    • Leukemia Father    • Acute lymphoblastic leukemia Father    • Cancer Sister        Social History     Occupational History   • Not on file   Tobacco Use   • Smoking status: Some Days     Packs/day: 0 25     Types: Cigarettes     Last attempt to quit: 2022     Years since quittin 5   • Smokeless tobacco: Never   Vaping Use   • Vaping Use: Never used   Substance and Sexual Activity   • Alcohol use: Not Currently   • Drug use: Not Currently     Types: Heroin     Comment: clean since , now on methadone   • Sexual activity: Not on file         Current Outpatient Medications:   •  acetaminophen (TYLENOL) 325 mg tablet, Take 3 tablets (975 mg total) by mouth every 8 (eight) hours, Disp: , Rfl: 0  •  albuterol (PROVENTIL HFA,VENTOLIN HFA) 90 mcg/act inhaler, Inhale 2 puffs every 4 (four) hours as needed for wheezing, Disp: , Rfl: 0  •  aspirin 81 mg chewable tablet, Chew 81 mg daily, Disp: , Rfl:   •  atorvastatin (LIPITOR) 20 mg tablet, Take 1 tablet by mouth once daily, Disp: 90 tablet, Rfl: 0  •  Blood Glucose Monitoring Suppl (OneTouch Verio) w/Device KIT, Use to test blood sugars 3 times daily, Disp: 1 kit, Rfl: 0  •  cholecalciferol (VITAMIN D3) 1,000 units tablet, Take 2 "tablets (2,000 Units total) by mouth daily, Disp: 12 tablet, Rfl: 0  •  Continuous Blood Gluc  (FreeStyle Ke 2 [place]) JOSEPH, Use as directed, Disp: 1 each, Rfl: 0  •  Continuous Blood Gluc Sensor (FreeStyle Ke 2 Sensor) MISC, Change every 14 days, Disp: 2 each, Rfl: 2  •  Diclofenac Sodium (VOLTAREN) 1 %, Apply 4 g topically 4 (four) times a day as needed (as needed for pain), Disp: 50 g, Rfl: 1  •  DULoxetine (CYMBALTA) 60 mg delayed release capsule, Take 1 capsule (60 mg total) by mouth daily at bedtime, Disp: 90 capsule, Rfl: 1  •  Eliquis 5 MG, Take 1 tablet by mouth twice daily, Disp: 180 tablet, Rfl: 0  •  famotidine (PEPCID) 20 mg tablet, Take 1 tablet (20 mg total) by mouth 2 (two) times a day, Disp: 30 tablet, Rfl: 8  •  ferrous sulfate 324 (65 Fe) mg, Take 1 tablet (324 mg total) by mouth daily before breakfast, Disp: 30 tablet, Rfl: 3  •  fluticasone (FLONASE) 50 mcg/act nasal spray, 1 spray into each nostril daily (Patient taking differently: 1 spray into each nostril if needed), Disp: 16 g, Rfl: 2  •  glucose blood test strip, Use 1 each 3 (three) times daily after meals Use as instructed, Disp: 270 each, Rfl: 3  •  insulin lispro (HumaLOG KwikPen) 100 units/mL injection pen, Blood Glucose 150 - 224: 4 Unit of Insulin Blood Glucose 225 - 299: 5 Units of Insulin Blood Glucose 300 - 374: 6 Units of Insulin Blood Glucose 375 - 449: 7 Units of Insulin Blood Glucose greater than or equal to 450: 8 Units of Insulin Max of 24 units per day, Disp: 15 mL, Rfl: 3  •  Insulin Pen Needle 31G X 5 MM MISC, Use daily Pt to inject 4 X daily (Patient taking differently: Use Check sugars three times a day), Disp: 100 each, Rfl: 5  •  Insulin Pen Needle 31G X 5 MM MISC, 30 units sq 2X daily, Disp: 100 each, Rfl: 3  •  Insulin Syringe-Needle U-100 (BD Veo Insulin Syringe U/F) 31G X 15/64\" 0 5 ML MISC, Inject under the skin 3 (three) times a day Use as directed, Disp: 300 each, Rfl: 5  •  Janumet  MG " per tablet, TAKE 1 TABLET BY MOUTH TWICE DAILY WITH MEALS, Disp: 180 tablet, Rfl: 0  •  Jardiance 25 MG TABS, TAKE 1 TABLET BY MOUTH ONCE DAILY IN THE MORNING, Disp: 30 tablet, Rfl: 0  •  Lantus 100 UNIT/ML subcutaneous injection, INJECT 29  UNITS ONCE DAILY AT BEDTIME, Disp: 10 mL, Rfl: 0  •  levocetirizine (XYZAL) 5 MG tablet, TAKE 1 TABLET BY MOUTH ONCE DAILY IN THE EVENING, Disp: 90 tablet, Rfl: 0  •  lidocaine (LMX) 4 % cream, Apply topically 4 (four) times a day as needed for mild pain, Disp: 30 g, Rfl: 0  •  lisinopril (ZESTRIL) 10 mg tablet, Take 1 tablet by mouth once daily, Disp: 90 tablet, Rfl: 0  •  magnesium oxide (MAG-OX) 400 mg, Take 1 tablet (400 mg total) by mouth daily, Disp: 90 tablet, Rfl: 1  •  menthol-methyl salicylate (BENGAY) 37-22 % cream, Apply topically 4 (four) times a day as needed (torticollis), Disp: , Rfl: 0  •  methadone (DOLOPHINE) 10 mg tablet, Take 70 mg by mouth daily,, Disp: , Rfl:   •  multivitamin-minerals (CENTRUM ADULTS) tablet, Take 1 tablet by mouth daily, Disp: , Rfl: 0  •  naloxone (NARCAN) 4 mg/0 1 mL nasal spray, Administer 1 spray into a nostril  If no response after 2-3 minutes, give another dose in the other nostril using a new spray , Disp: 1 each, Rfl: 1  •  nicotine (NICODERM CQ) 21 mg/24 hr TD 24 hr patch, Place 1 patch on the skin every 24 hours, Disp: 28 patch, Rfl: 0  •  ondansetron (ZOFRAN) 4 mg tablet, Take 1 tablet (4 mg total) by mouth every 8 (eight) hours as needed for nausea or vomiting, Disp: 40 tablet, Rfl: 2  •  pantoprazole (PROTONIX) 40 mg tablet, Take 1 tablet (40 mg total) by mouth daily, Disp: 30 tablet, Rfl: 2  •  Semglee, yfgn, 100 UNIT/ML SOLN, INJECT 29 UNITS SUBCUTANEOUSLY ONCE DAILY AT BEDTIME, Disp: , Rfl:   •  silver sulfadiazine (SILVADENE,SSD) 1 % cream, Apply topically daily Apply as directed to left heel ulcer daily with each dressing change   (Patient taking differently: Apply topically if needed), Disp: 25 g, Rfl: 2  •  traZODone "(DESYREL) 50 mg tablet, TAKE 1 TABLET BY MOUTH ONCE DAILY AT BEDTIME AS NEEDED FOR SLEEP, Disp: 30 tablet, Rfl: 0    Allergies   Allergen Reactions   • Varenicline Rash       Physical Exam:    /76   Pulse 92   Ht 5' 8\" (1 727 m)   BMI 26 30 kg/m²     Constitutional:normal, well developed, well nourished, alert, in no distress and non-toxic and no overt pain behavior  Eyes:anicteric  HEENT:grossly intact  Neck:supple, symmetric, trachea midline and no masses   Pulmonary:even and unlabored  Cardiovascular:No edema or pitting edema present  Skin:Normal without rashes or lesions and well hydrated  Psychiatric:Mood and affect appropriate  Neurologic:Cranial Nerves II-XII grossly intact  Musculoskeletal:in wheelchair and right AKA    Imaging  No orders to display       No orders of the defined types were placed in this encounter        "

## 2023-06-10 LAB
ALBUMIN SERPL BCP-MCNC: 3.2 G/DL (ref 3.5–5)
ALP SERPL-CCNC: 114 U/L (ref 46–116)
ALT SERPL W P-5'-P-CCNC: 25 U/L (ref 12–78)
ANION GAP SERPL CALCULATED.3IONS-SCNC: 0 MMOL/L (ref 4–13)
AST SERPL W P-5'-P-CCNC: 22 U/L (ref 5–45)
BILIRUB SERPL-MCNC: 0.39 MG/DL (ref 0.2–1)
BUN SERPL-MCNC: 17 MG/DL (ref 5–25)
CALCIUM ALBUM COR SERPL-MCNC: 9.8 MG/DL (ref 8.3–10.1)
CALCIUM SERPL-MCNC: 9.2 MG/DL (ref 8.3–10.1)
CHLORIDE SERPL-SCNC: 104 MMOL/L (ref 96–108)
CO2 SERPL-SCNC: 29 MMOL/L (ref 21–32)
CREAT SERPL-MCNC: 1 MG/DL (ref 0.6–1.3)
EST. AVERAGE GLUCOSE BLD GHB EST-MCNC: 163 MG/DL
FERRITIN SERPL-MCNC: 41 NG/ML (ref 24–336)
GFR SERPL CREATININE-BSD FRML MDRD: 78 ML/MIN/1.73SQ M
GLUCOSE SERPL-MCNC: 345 MG/DL (ref 65–140)
HBA1C MFR BLD: 7.3 %
IRON SATN MFR SERPL: 18 % (ref 20–50)
IRON SERPL-MCNC: 51 UG/DL (ref 65–175)
POTASSIUM SERPL-SCNC: 4.8 MMOL/L (ref 3.5–5.3)
PROT SERPL-MCNC: 7.6 G/DL (ref 6.4–8.4)
SODIUM SERPL-SCNC: 133 MMOL/L (ref 135–147)
TIBC SERPL-MCNC: 290 UG/DL (ref 250–450)

## 2023-06-13 DIAGNOSIS — Z79.4 TYPE 2 DIABETES MELLITUS WITH HYPERGLYCEMIA, WITH LONG-TERM CURRENT USE OF INSULIN (HCC): Chronic | ICD-10-CM

## 2023-06-13 DIAGNOSIS — E11.65 TYPE 2 DIABETES MELLITUS WITH HYPERGLYCEMIA, WITH LONG-TERM CURRENT USE OF INSULIN (HCC): Chronic | ICD-10-CM

## 2023-06-13 DIAGNOSIS — G47.9 SLEEP DISTURBANCE: ICD-10-CM

## 2023-06-13 RX ORDER — TRAZODONE HYDROCHLORIDE 50 MG/1
TABLET ORAL
Qty: 30 TABLET | Refills: 0 | Status: SHIPPED | OUTPATIENT
Start: 2023-06-13

## 2023-06-13 RX ORDER — INSULIN GLARGINE-YFGN 100 [IU]/ML
INJECTION, SOLUTION SUBCUTANEOUS
Qty: 10 ML | Refills: 0 | Status: SHIPPED | OUTPATIENT
Start: 2023-06-13

## 2023-06-21 ENCOUNTER — OFFICE VISIT (OUTPATIENT)
Dept: GASTROENTEROLOGY | Facility: CLINIC | Age: 63
End: 2023-06-21
Payer: MEDICARE

## 2023-06-21 VITALS
HEIGHT: 68 IN | HEART RATE: 92 BPM | SYSTOLIC BLOOD PRESSURE: 128 MMHG | TEMPERATURE: 98.1 F | OXYGEN SATURATION: 97 % | DIASTOLIC BLOOD PRESSURE: 66 MMHG | BODY MASS INDEX: 26.3 KG/M2

## 2023-06-21 DIAGNOSIS — E44.0 MODERATE PROTEIN-CALORIE MALNUTRITION (HCC): ICD-10-CM

## 2023-06-21 DIAGNOSIS — K21.9 GASTROESOPHAGEAL REFLUX DISEASE, UNSPECIFIED WHETHER ESOPHAGITIS PRESENT: ICD-10-CM

## 2023-06-21 DIAGNOSIS — K31.84 GASTROPARESIS: ICD-10-CM

## 2023-06-21 DIAGNOSIS — K59.09 CHRONIC CONSTIPATION: ICD-10-CM

## 2023-06-21 DIAGNOSIS — R11.2 NAUSEA AND VOMITING, UNSPECIFIED VOMITING TYPE: Primary | ICD-10-CM

## 2023-06-21 DIAGNOSIS — F17.200 TOBACCO USE DISORDER: ICD-10-CM

## 2023-06-21 DIAGNOSIS — E61.1 IRON DEFICIENCY: ICD-10-CM

## 2023-06-21 DIAGNOSIS — R74.8 ELEVATED ALKALINE PHOSPHATASE LEVEL: ICD-10-CM

## 2023-06-21 DIAGNOSIS — Z12.11 SCREENING FOR COLON CANCER: ICD-10-CM

## 2023-06-21 DIAGNOSIS — B18.2 CHRONIC HEPATITIS C WITHOUT HEPATIC COMA (HCC): ICD-10-CM

## 2023-06-21 PROCEDURE — 99215 OFFICE O/P EST HI 40 MIN: CPT | Performed by: PHYSICIAN ASSISTANT

## 2023-06-21 RX ORDER — POLYETHYLENE GLYCOL 3350 17 G/17G
17 POWDER, FOR SOLUTION ORAL 2 TIMES DAILY
Qty: 850 G | Refills: 2 | Status: SHIPPED | OUTPATIENT
Start: 2023-06-21

## 2023-06-21 RX ORDER — FAMOTIDINE 40 MG/1
20 TABLET, FILM COATED ORAL
Qty: 30 TABLET | Refills: 5 | Status: SHIPPED | OUTPATIENT
Start: 2023-06-21 | End: 2023-06-22

## 2023-06-21 NOTE — PROGRESS NOTES
[facility] - Outpatient Follow-up Note  Elia Kilpatrick 70 y o  male MRN: [id number]  Encounter: [id number]    ASSESSMENT AND PLAN:      1  Nausea and vomiting, unspecified vomiting type  2  Gastroesophageal reflux disease, unspecified whether esophagitis present  3  Gastroparesis     Patient is here today to follow-up on his chronic upper GI symptoms of nausea, nonbloody emesis, and early satiety  He did have a gastric emptying study completed which demonstrated delayed rate of gastric emptying consistent with at least moderate gastroparesis  He has however not had an upper endoscopy completed and we did review the importance of this to evaluate for any obvious peptic ulcer disease, gastritis, gastric outlet obstruction, other etiology that may be contributing to his upper GI symptoms  He has several risk factors for delayed gastric emptying, including DM 2 and pharmacotherapy, and we did review that his chronic use of methadone is likely adversely affecting his GI motility as well  We reviewed a gastroparesis diet  Recommend referral to Nutritionist    Recommend continuing on daily PPI  Adjust dosing for nightly famotidine to take 40 mg prior to bed  Okay for as needed usage of antiemetic ondansetron  Proceed with upper endoscopy now  Risks associated with endoscopic evaluation discussed with patient today, including but not limited to bleeding, infection, perforation, and organ injury, all of which are low  Pt demonstrates understanding and is agreeable to the plan  He will require Eliquis hold prior to procedures  - famotidine (PEPCID) 40 MG tablet; Take 0 5 tablets (20 mg total) by mouth daily at bedtime  Dispense: 30 tablet; Refill: 5  - CBC and Platelet; Future  - EGD; Future    4  Chronic constipation    Likely in part secondary to chronic methadone usage as well as immobility    He is utilizing stool softeners and his caregiver/nephew shares that this is somewhat helpful  I suspect he needs a much more robust bowel regimen and we will start on MiraLAX twice daily at this time  I encouraged excellent hydration  We will follow-up with patient in a couple of weeks to see how he is doing on this regimen, and we may need to escalate therapy further  We can consider addition of prescription strength cathartic in the future  He may benefit from a PAMORA In future if constipation is refractory to other treatments  - polyethylene glycol (GOLYTELY) 4000 mL solution; Take 4,000 mL by mouth once for 1 dose  Dispense: 4000 mL; Refill: 0  - polyethylene glycol (GLYCOLAX) 17 GM/SCOOP powder; Take 17 g by mouth 2 (two) times a day  Dispense: 850 g; Refill: 2    5  Screening for colon cancer    Overdue for screening  I did review with patient that given his GI symptoms, he would likely benefit from a colonoscopy is the gold standard of colon cancer screening  Alternatively, if he feels he would be unable to complete a colonoscopy prep, I did review that a Cologuard would also be a reasonable screening modality, though if positive would warrant colonoscopy  After some discussion, patient is agreeable to proceed with colonoscopy attempt at the time of endoscopy    - Colonoscopy; Future    6  Chronic hepatitis C without hepatic coma (HCC)    History of such, treated several years ago with SVR, recent viral load undetectable from 01/2023     7  Iron deficiency     On patient's problem list  Recent iron panel with mild iron deficiency  Pt should take iron supplementation every other day with vitamin C to help with absorption; we will plan to repeat his CBC now to ensure no new anemia has developed  If he fails to respond to oral iron, could consider infusions  8  Elevated ALP     Patient with elevated alkaline phosphatase levels  His serologic evaluation for chronic liver disease was negative for any viral hepatitis, genetic etiology, though LOREN was positive    His GGT was normal, which indicates elevation of ALP is not from liver origin  Ultrasound was ordered though not completed  It may be reasonable to complete to ensure no biliary abnormalities  He should follow-up with his PCP regarding this persistent ALP elevation  9  Tobacco use disorder     Encourage smoking cessation  Pt has numerous comorbid conditions for which tobacco use increases risk of overall adverse outcomes  We will follow up after bidirectional endoscopic evaluation  ______________________________________________________________________    SUBJECTIVE: Patient is a 70 y o  male who presents today for follow-up regarding vomiting  Past medical history significant for DM2, HTN, HLD, PAD, VTE on Eliquis, MAT, history of right AKA  Pt is new to me  He was last evaluated by Gregoria Cadena in 12/2022  At that time, he was complaining of significant nausea and nonbloody emesis as well as poor appetite and early satiety  He was scheduled for an EGD though did not have this completed  He did have a gastric emptying study which demonstrated moderate to severe delayed rate of gastric emptying (T-1/2 260 minutes)  It was recommended he have a cool colonoscopy at that time though he opted for Cologuard  Despite numerous attempts to send in the stool sample, the company stated it was an inadequate sample and was not able to be assessed  06/22/23:     Patient is here today with his nephew Roderick Narvaez, who is also his caregiver  He shares that he is continuing to deal with nausea, which is present several days a week in the morning when he awakens as well as postprandially  He shares there is excess amounts of phlegm in his throat which tends to make him nauseous  He does endorse intermittent nonbloody emesis  He denies any dysphagia or odynophagia  He denies any abnormal weight loss in the past 6 months  He is not having much of the typical heartburn/reflux type symptoms      Pertaining to his bowels, he does deal with chronic constipation, which in part is attributed to his pain medication regimen  He is taking stool softeners daily  He is having bowel movements every few days  Is needing to strain  He notes stool caliber is a [place] stool 2-4  He denies BRBPR or melenic stools  No nocturnal BMs  Denies family hx of colon cancer  He also notes hx of Hepatitis C  Thinks he was treated with Errol Fortune in past  Has SVR  NSAIDs: advil PRN    Etoh: none   Tobacco: 1 PPD     01/2023: GES T 1/2: 260 mins   06/2023: A1C 7 3, ferritin 41, TSAT 18, TIBC 290, Iron 51, BUN 17, Cr 1 00, AST 22, ALT 25, , albumin 3 2, t bili 0 39  01/2023: Hep C not detected, A1AT 165, AMA < 20, ASMA 13, ceruloplasmin 30 1, GGT 44, LOREN positive     Review of Systems   Otherwise Per HPI    Historical Information   Past Medical History:   Diagnosis Date   • Arthritis    • Diabetes mellitus ([facility] )    • DVT (deep venous thrombosis) (HCC)    • GERD (gastroesophageal reflux disease)    • History of COVID-19 2020    severe disease, intubated/ICU x 1 month   • History of drug abuse ([facility] )     heroin abuse, clean since 2003   • Hypercholesteremia    • Hypertension    • Ischemia of right lower extremity with suspected rhabdomyolysis 02/06/2021   • Left Hydropneumothorax 02/10/2021   • Methadone use    • Pneumonia due to COVID-19 virus 01/11/2021   • Subacute osteomyelitis of right femur ([facility] ) 09/13/2021   • Vascular disorder of lower extremity      Past Surgical History:   Procedure Laterality Date   • AMPUTATION ABOVE KNEE (AKA) Right 01/14/2021    Procedure: AMPUTATION ABOVE KNEE (AKA);   Surgeon: Jolie Mccracken MD;  [facility]: [facility];  Service: Vascular   • AMPUTATION ABOVE KNEE (AKA) Right 01/18/2021    Procedure: AMPUTATION ABOVE KNEE (AKA) FORMALIZATION,  R AKA wound washout, wound closure;  Surgeon: Jolie Mccracken MD;  [facility];  Service: Vascular   • ARTERIOGRAM Right 01/13/2021    Procedure: ARTERIOGRAM;  Surgeon: Andres Galicia DO;  Location: [facility];  Service: Vascular   • BRONCHOSCOPY     • IR CHEST TUBE PLACEMENT  02/10/2021   • IR LOWER EXTREMITY ANGIOGRAM  2021   • IR LOWER EXTREMITY ANGIOGRAM  2022   • SD AMPUTATION TOE METATARSOPHALANGEAL JOINT Left 2023    Procedure: AMPUTATION TOE- hallux;   Surgeon: Lazarus Estelle, DPM;  Location: [facility];  Service: Podiatry   • SD Jose J Baldwin 3RD+ ORD SLCTV ABDL PEL/LXTR [facility] Left 2022    Procedure: LEG AGRAM W/ INTERVENTION;  Surgeon: Jenifer Lane MD;  Location: [facility];  Service: Vascular   • THROMBECTOMY W/ EMBOLECTOMY Right 2021    Procedure: EMBOLECTOMY/THROMBECTOMY LOWER EXTREMITY;  Surgeon: Marcie Jama DO;  [facility]: [facility];  Service: Vascular     Social History   Social History     Substance and Sexual Activity   Alcohol Use Not Currently     Social History     Substance and Sexual Activity   Drug Use Not Currently   • Types: Heroin    Comment: clean since , now on methadone     Social History     Tobacco Use   Smoking Status Some Days   • Packs/day: 0 25   • Types: Cigarettes   • Last attempt to quit: 2022   • Years since quittin 5   Smokeless Tobacco Never     Family History   Problem Relation Age of Onset   • Diabetes Mother    • Hypertension Father    • Leukemia Father    • Acute lymphoblastic leukemia Father    • Cancer Sister      Meds/Allergies       Current Outpatient Medications:   •  acetaminophen (TYLENOL) 325 mg tablet  •  albuterol (PROVENTIL HFA,VENTOLIN HFA) 90 mcg/act inhaler  •  aspirin 81 mg chewable tablet  •  atorvastatin (LIPITOR) 20 mg tablet  •  Blood Glucose Monitoring Suppl (OneTouch Verio) w/Device KIT  •  cholecalciferol (VITAMIN D3) 1,000 units tablet  •  Continuous Blood Gluc  (FreeStyle Ke 2 [place]) JOSEPH  •  Continuous Blood Gluc Sensor (FreeStyle Ke 2 Sensor) MISC  •  Diclofenac Sodium (VOLTAREN) 1 %  •  DULoxetine (CYMBALTA) 60 mg delayed "release capsule  •  Eliquis 5 MG  •  ferrous sulfate 324 (65 Fe) mg  •  fluticasone (FLONASE) 50 mcg/act nasal spray  •  glucose blood test strip  •  Insulin Pen Needle 31G X 5 MM MISC  •  Insulin Syringe-Needle U-100 (BD Veo Insulin Syringe U/F) 31G X 15/64\" 0 5 ML MISC  •  Janumet  MG per tablet  •  Jardiance 25 MG TABS  •  levocetirizine (XYZAL) 5 MG tablet  •  lidocaine (LMX) 4 % cream  •  lisinopril (ZESTRIL) 10 mg tablet  •  magnesium oxide (MAG-OX) 400 mg  •  menthol-methyl salicylate (BENGAY) 02-71 % cream  •  methadone (DOLOPHINE) 10 mg tablet  •  multivitamin-minerals (CENTRUM ADULTS) tablet  •  naloxone (NARCAN) 4 mg/0 1 mL nasal spray  •  nicotine (NICODERM CQ) 21 mg/24 hr TD 24 hr patch  •  ondansetron (ZOFRAN) 4 mg tablet  •  pantoprazole (PROTONIX) 40 mg tablet  •  Semglee, yfgn, 100 UNIT/ML SOLN  •  silver sulfadiazine (SILVADENE,SSD) 1 % cream  •  traZODone (DESYREL) 50 mg tablet  •  famotidine (PEPCID) 20 mg tablet  •  insulin lispro (HumaLOG KwikPen) 100 units/mL injection pen  •  Insulin Pen Needle 31G X 5 MM MISC  •  Semglee, yfgn, 100 UNIT/ML SOLN    Allergies   Allergen Reactions   • Varenicline Rash     Objective     Blood pressure 128/66, pulse 92, temperature 98 1 °F (36 7 °C), temperature source Tympanic, height 5' 8\" (1 727 m), SpO2 97 %  Body mass index is 26 3 kg/m²  Physical Exam  Vitals and nursing note reviewed  Constitutional:       Comments: Chronically ill appearing male    HENT:      Head: Normocephalic and atraumatic  Eyes:      General: No scleral icterus  Conjunctiva/sclera: Conjunctivae normal    Cardiovascular:      Rate and Rhythm: Normal rate  Pulmonary:      Effort: Pulmonary effort is normal  No respiratory distress  Abdominal:      General: Abdomen is flat  Bowel sounds are normal  There is no distension  Palpations: Abdomen is soft  There is no mass  Tenderness: There is no abdominal tenderness  There is no guarding     Musculoskeletal: " Comments: Right AKA    Skin:     General: Skin is warm and dry  Coloration: Skin is not jaundiced  Neurological:      General: No focal deficit present  Mental Status: He is alert and oriented to person, place, and time  Psychiatric:         Mood and Affect: Mood normal          Behavior: Behavior normal        Lab Results:   No visits with results within 1 Day(s) from this visit  Latest known visit with results is:   Appointment on 06/09/2023   Component Date Value   • Hemoglobin A1C 06/09/2023 7 3 (H)    • EAG 06/09/2023 163    • Sodium 06/09/2023 133 (L)    • Potassium 06/09/2023 4 8    • Chloride 06/09/2023 104    • CO2 06/09/2023 29    • ANION GAP 06/09/2023 0 (L)    • BUN 06/09/2023 17    • Creatinine 06/09/2023 1 00    • Glucose 06/09/2023 345 (H)    • Calcium 06/09/2023 9 2    • Corrected Calcium 06/09/2023 9 8    • AST 06/09/2023 22    • ALT 06/09/2023 25    • Alkaline Phosphatase 06/09/2023 114    • Total Protein 06/09/2023 7 6    • Albumin 06/09/2023 3 2 (L)    • Total Bilirubin 06/09/2023 0 39    • eGFR 06/09/2023 78    • Iron Saturation 06/09/2023 18 (L)    • TIBC 06/09/2023 290    • Iron 06/09/2023 51 (L)    • Ferritin 06/09/2023 41      Radiology Results:   No results found  Minor Payne PA-C    **Please note:  Dictation voice to text software may have been used in the creation of this record  Occasional wrong word or “sound alike” substitutions may have occurred due to the inherent limitations of voice recognition software  Read the chart carefully and recognize, using context, where substitutions have occurred  **

## 2023-06-21 NOTE — PATIENT INSTRUCTIONS
Stay on the pantoprazole/Protonix every morning  Add in famotidine/Pepcid 40 mg in the evenings  If after a couple of weeks you are still having nausea and abdominal discomfort, call us for additional recommendations on medication  I think your constipation and backup of stool is playing a large role in these GI symptoms  We are going to start you on 2 doses of MiraLAX every single day  Stay well-hydrated  You can continue on the stool softeners as well if you find them helpful  Sometimes we need to do something more potent to help you poop  For your upper and lower GI symptoms we are going to proceed with an upper endoscopy and a colonoscopy at the same time

## 2023-06-22 RX ORDER — FAMOTIDINE 40 MG/1
40 TABLET, FILM COATED ORAL
Qty: 30 TABLET | Refills: 5 | Status: SHIPPED | OUTPATIENT
Start: 2023-06-22

## 2023-07-03 DIAGNOSIS — Z79.4 TYPE 2 DIABETES MELLITUS WITH DIABETIC POLYNEUROPATHY, WITH LONG-TERM CURRENT USE OF INSULIN (HCC): ICD-10-CM

## 2023-07-03 DIAGNOSIS — E11.42 TYPE 2 DIABETES MELLITUS WITH DIABETIC POLYNEUROPATHY, WITH LONG-TERM CURRENT USE OF INSULIN (HCC): ICD-10-CM

## 2023-07-03 RX ORDER — EMPAGLIFLOZIN 25 MG/1
TABLET, FILM COATED ORAL
Qty: 30 TABLET | Refills: 0 | Status: SHIPPED | OUTPATIENT
Start: 2023-07-03 | End: 2023-07-13 | Stop reason: SDUPTHER

## 2023-07-03 RX ORDER — SITAGLIPTIN AND METFORMIN HYDROCHLORIDE 1000; 50 MG/1; MG/1
TABLET, FILM COATED ORAL
Qty: 180 TABLET | Refills: 0 | Status: SHIPPED | OUTPATIENT
Start: 2023-07-03 | End: 2023-07-13 | Stop reason: SDUPTHER

## 2023-07-04 DIAGNOSIS — E11.65 TYPE 2 DIABETES MELLITUS WITH HYPERGLYCEMIA, WITH LONG-TERM CURRENT USE OF INSULIN (HCC): Chronic | ICD-10-CM

## 2023-07-04 DIAGNOSIS — Z79.4 TYPE 2 DIABETES MELLITUS WITH HYPERGLYCEMIA, WITH LONG-TERM CURRENT USE OF INSULIN (HCC): Chronic | ICD-10-CM

## 2023-07-06 ENCOUNTER — TELEPHONE (OUTPATIENT)
Dept: PAIN MEDICINE | Facility: CLINIC | Age: 63
End: 2023-07-06

## 2023-07-06 NOTE — TELEPHONE ENCOUNTER
Received fax from [address] [facility] that script for Duloxetine HCl 60mg DR capsules requires prior auth    KEY: [id number]

## 2023-07-10 ENCOUNTER — HOSPITAL ENCOUNTER (OUTPATIENT)
Dept: NON INVASIVE DIAGNOSTICS | Facility: HOSPITAL | Age: 63
Discharge: HOME | End: 2023-07-10
Payer: MEDICARE

## 2023-07-10 DIAGNOSIS — G47.9 SLEEP DISTURBANCE: ICD-10-CM

## 2023-07-10 DIAGNOSIS — I73.9 PAD (PERIPHERAL ARTERY DISEASE) (HCC): ICD-10-CM

## 2023-07-10 DIAGNOSIS — E61.1 IRON DEFICIENCY: ICD-10-CM

## 2023-07-10 PROCEDURE — 93926 LOWER EXTREMITY STUDY: CPT

## 2023-07-10 PROCEDURE — 93922 UPR/L XTREMITY ART 2 LEVELS: CPT | Performed by: SURGERY

## 2023-07-10 PROCEDURE — 93926 LOWER EXTREMITY STUDY: CPT | Performed by: SURGERY

## 2023-07-10 RX ORDER — TRAZODONE HYDROCHLORIDE 50 MG/1
TABLET ORAL
Qty: 30 TABLET | Refills: 0 | Status: SHIPPED | OUTPATIENT
Start: 2023-07-10 | End: 2023-07-13 | Stop reason: SDUPTHER

## 2023-07-10 RX ORDER — INSULIN GLARGINE-YFGN 100 [IU]/ML
INJECTION, SOLUTION SUBCUTANEOUS
Qty: 10 ML | Refills: 0 | Status: SHIPPED | OUTPATIENT
Start: 2023-07-10 | End: 2023-07-13

## 2023-07-10 RX ORDER — FERROUS SULFATE TAB EC 324 MG (65 MG FE EQUIVALENT) 324 (65 FE) MG
324 TABLET DELAYED RESPONSE ORAL
Qty: 30 TABLET | Refills: 3 | Status: SHIPPED | OUTPATIENT
Start: 2023-07-10

## 2023-07-10 NOTE — TELEPHONE ENCOUNTER
Received phone call from pharmacy to refill medication med sent to rx refill pod to run protocol if protocol pass team can refill med.      Call task completed TF

## 2023-07-13 ENCOUNTER — OFFICE VISIT (OUTPATIENT)
Dept: FAMILY MEDICINE CLINIC | Facility: CLINIC | Age: 63
End: 2023-07-13

## 2023-07-13 VITALS
HEIGHT: 68 IN | DIASTOLIC BLOOD PRESSURE: 70 MMHG | SYSTOLIC BLOOD PRESSURE: 118 MMHG | HEART RATE: 99 BPM | TEMPERATURE: 99.2 F | OXYGEN SATURATION: 97 % | BODY MASS INDEX: 26.3 KG/M2

## 2023-07-13 DIAGNOSIS — G47.9 SLEEP DISTURBANCE: ICD-10-CM

## 2023-07-13 DIAGNOSIS — J84.10 PULMONARY FIBROSIS (HCC): ICD-10-CM

## 2023-07-13 DIAGNOSIS — I50.9 HEART FAILURE, UNSPECIFIED HF CHRONICITY, UNSPECIFIED HEART FAILURE TYPE (HCC): ICD-10-CM

## 2023-07-13 DIAGNOSIS — Z87.891 PERSONAL HISTORY OF NICOTINE DEPENDENCE: ICD-10-CM

## 2023-07-13 DIAGNOSIS — E11.65 TYPE 2 DIABETES MELLITUS WITH HYPERGLYCEMIA, WITH LONG-TERM CURRENT USE OF INSULIN (HCC): Chronic | ICD-10-CM

## 2023-07-13 DIAGNOSIS — Z79.4 TYPE 2 DIABETES MELLITUS WITH DIABETIC POLYNEUROPATHY, WITH LONG-TERM CURRENT USE OF INSULIN (HCC): ICD-10-CM

## 2023-07-13 DIAGNOSIS — I74.10 AORTIC THROMBUS (HCC): ICD-10-CM

## 2023-07-13 DIAGNOSIS — L89.150 PRESSURE ULCER OF SACRAL REGION, UNSTAGEABLE (HCC): ICD-10-CM

## 2023-07-13 DIAGNOSIS — Z23 ENCOUNTER FOR IMMUNIZATION: ICD-10-CM

## 2023-07-13 DIAGNOSIS — E08.621 DIABETIC ULCER OF LEFT HEEL ASSOCIATED WITH DIABETES MELLITUS DUE TO UNDERLYING CONDITION, LIMITED TO BREAKDOWN OF SKIN (HCC): ICD-10-CM

## 2023-07-13 DIAGNOSIS — Z13.6 SCREENING FOR AAA (ABDOMINAL AORTIC ANEURYSM): ICD-10-CM

## 2023-07-13 DIAGNOSIS — E27.40 UNSPECIFIED ADRENOCORTICAL INSUFFICIENCY (HCC): ICD-10-CM

## 2023-07-13 DIAGNOSIS — R53.1 DECREASED STRENGTH: ICD-10-CM

## 2023-07-13 DIAGNOSIS — Z89.619 HISTORY OF LEG AMPUTATION (HCC): ICD-10-CM

## 2023-07-13 DIAGNOSIS — Z00.00 MEDICARE ANNUAL WELLNESS VISIT, SUBSEQUENT: Primary | ICD-10-CM

## 2023-07-13 DIAGNOSIS — L97.421 DIABETIC ULCER OF LEFT HEEL ASSOCIATED WITH DIABETES MELLITUS DUE TO UNDERLYING CONDITION, LIMITED TO BREAKDOWN OF SKIN (HCC): ICD-10-CM

## 2023-07-13 DIAGNOSIS — E11.42 TYPE 2 DIABETES MELLITUS WITH DIABETIC POLYNEUROPATHY, WITH LONG-TERM CURRENT USE OF INSULIN (HCC): ICD-10-CM

## 2023-07-13 DIAGNOSIS — Z79.4 TYPE 2 DIABETES MELLITUS WITH HYPERGLYCEMIA, WITH LONG-TERM CURRENT USE OF INSULIN (HCC): Chronic | ICD-10-CM

## 2023-07-13 DIAGNOSIS — E11.69 HYPERLIPIDEMIA ASSOCIATED WITH TYPE 2 DIABETES MELLITUS (HCC): ICD-10-CM

## 2023-07-13 DIAGNOSIS — E78.5 HYPERLIPIDEMIA ASSOCIATED WITH TYPE 2 DIABETES MELLITUS (HCC): ICD-10-CM

## 2023-07-13 DIAGNOSIS — J30.2 SEASONAL ALLERGIES: ICD-10-CM

## 2023-07-13 DIAGNOSIS — F11.20 OPIOID DEPENDENCE, UNCOMPLICATED (HCC): ICD-10-CM

## 2023-07-13 RX ORDER — MAGNESIUM OXIDE TAB 400 MG (241.3 MG ELEMENTAL MG) 400 (241.3 MG) MG
400 TAB ORAL DAILY
COMMUNITY
Start: 2023-06-03 | End: 2023-07-13

## 2023-07-13 RX ORDER — ATORVASTATIN CALCIUM 20 MG/1
20 TABLET, FILM COATED ORAL DAILY
Qty: 90 TABLET | Refills: 3 | Status: SHIPPED | OUTPATIENT
Start: 2023-07-13

## 2023-07-13 RX ORDER — LEVOCETIRIZINE DIHYDROCHLORIDE 5 MG/1
5 TABLET, FILM COATED ORAL EVERY EVENING
Qty: 90 TABLET | Refills: 3 | Status: SHIPPED | OUTPATIENT
Start: 2023-07-13

## 2023-07-13 RX ORDER — LISINOPRIL 10 MG/1
10 TABLET ORAL DAILY
Qty: 90 TABLET | Refills: 3 | Status: SHIPPED | OUTPATIENT
Start: 2023-07-13

## 2023-07-13 RX ORDER — SITAGLIPTIN AND METFORMIN HYDROCHLORIDE 1000; 50 MG/1; MG/1
1 TABLET, FILM COATED ORAL 2 TIMES DAILY WITH MEALS
Qty: 180 TABLET | Refills: 3 | Status: SHIPPED | OUTPATIENT
Start: 2023-07-13

## 2023-07-13 RX ORDER — TRAZODONE HYDROCHLORIDE 50 MG/1
50 TABLET ORAL
Qty: 90 TABLET | Refills: 3 | Status: SHIPPED | OUTPATIENT
Start: 2023-07-13

## 2023-07-13 RX ORDER — ALBUTEROL SULFATE 90 UG/1
2 AEROSOL, METERED RESPIRATORY (INHALATION) EVERY 4 HOURS PRN
Qty: 18 G | Refills: 3 | Status: SHIPPED | OUTPATIENT
Start: 2023-07-13

## 2023-07-13 NOTE — PROGRESS NOTES
Assessment and Plan:     Problem List Items Addressed This Visit        Endocrine    Hyperlipidemia associated with type 2 diabetes mellitus ([address]) (Chronic)    Relevant Medications    Empagliflozin (Jardiance) 25 MG TABS    sitaGLIPtin-metFORMIN (Janumet)  MG per tablet    atorvastatin (LIPITOR) 20 mg tablet    Type 2 diabetes mellitus, with long-term current use of insulin (HCC) (Chronic)    Relevant Medications    Empagliflozin (Jardiance) 25 MG TABS    sitaGLIPtin-metFORMIN (Janumet)  MG per tablet    Other Relevant Orders    Albumin / creatinine urine ratio    Comprehensive metabolic panel    Hemoglobin A1C    Lipid panel    Type 2 diabetes mellitus with diabetic polyneuropathy (HCC)    Relevant Medications    Empagliflozin (Jardiance) 25 MG TABS    sitaGLIPtin-metFORMIN (Janumet)  MG per tablet    Unspecified adrenocortical insufficiency (HCC)       Respiratory    Pulmonary fibrosis (HCC)    Relevant Medications    levocetirizine (XYZAL) 5 MG tablet    albuterol (PROVENTIL HFA,VENTOLIN HFA) 90 mcg/act inhaler       Cardiovascular and Mediastinum    Aortic thrombus (HCC)    Relevant Medications    lisinopril (ZESTRIL) 10 mg tablet    apixaban (Eliquis) 5 mg    albuterol (PROVENTIL HFA,VENTOLIN HFA) 90 mcg/act inhaler    Heart failure, unspecified HF chronicity, unspecified heart failure type (HCC)       Other    Pressure ulcer of sacral region, unstageable ([address])   Other Visit Diagnoses     Medicare annual wellness visit, subsequent    -  Primary    Encounter for immunization        Relevant Orders    Pneumococcal Conjugate Vaccine 20-valent (Pcv20) (Completed)    Personal history of nicotine dependence        Relevant Orders    CT lung screening program    US abdominal aorta screening aaa    Screening for AAA (abdominal aortic aneurysm)        Relevant Orders    US abdominal aorta screening aaa    Diabetic ulcer of left heel associated with diabetes mellitus due to underlying condition, limited to breakdown of skin (HCC)        Relevant Medications    silver sulfadiazine (SILVADENE,SSD) 1 % cream    Empagliflozin (Jardiance) 25 MG TABS    sitaGLIPtin-metFORMIN (Janumet)  MG per tablet    Sleep disturbance        Relevant Medications    traZODone (DESYREL) 50 mg tablet    Seasonal allergies        Relevant Medications    levocetirizine (XYZAL) 5 MG tablet    Decreased strength        Relevant Orders    [facility] [id number] [facility]    History of leg amputation (HCC)        Opioid dependence, uncomplicated ([address])               Preventive health issues were discussed with patient, and age appropriate screening tests were ordered as noted in patient's After Visit Summary. Personalized health advice and appropriate referrals for health education or preventive services given if needed, as noted in patient's After Visit Summary. History of Present Illness:     Patient presents for a Medicare Wellness Visit    Patient overall doing well. Needed refills of his medications which were provided. Since having a toe amputation on the left foot, he has had weakness in his leg and feels that he is decreased a lot of strength as he was told for a while not to be walking on it. He would like to follow-up with physical therapy at home as he is wheelchair-bound. Referral placed. Patient states that his sugars have generally been running in the 120s. He does have freestyle delgado for continuous glucose monitoring. Has an appointment scheduled with endocrinology will be taking over his medications for his diabetes in August.  He also has an appointment scheduled for colonoscopy and endoscopy due to constipation and chronic nausea. This is scheduled in August.  Otherwise he is doing well. Does have a form for disability which was completed today. Has never had a AAA screening which is ordered. Is due for lung cancer screening counseled on smoking cessation.      Patient Care Team:  VERONICA Timmons as PCP - Darline Granados MD as PCP - Endocrinology (Endocrinology)  Helga Graves MD as PCP - PCP-[employer] (RTE)     Review of Systems:     Review of Systems   Constitutional: Negative for activity change, appetite change, diaphoresis, fatigue, fever and unexpected weight change. HENT: Negative for congestion, mouth sores, rhinorrhea, sinus pain, trouble swallowing and voice change. Eyes: Negative for photophobia and visual disturbance. Respiratory: Negative for apnea, cough, chest tightness, shortness of breath and wheezing. Cardiovascular: Negative for chest pain, palpitations and leg swelling. Gastrointestinal: Positive for constipation and nausea. Negative for abdominal distention, abdominal pain, blood in stool, diarrhea and vomiting. Endocrine: Negative for cold intolerance, heat intolerance, polydipsia, polyphagia and polyuria. Genitourinary: Negative for decreased urine volume, difficulty urinating, frequency and urgency. Musculoskeletal: Negative for arthralgias, myalgias, neck pain and neck stiffness. Skin: Negative for color change, rash and wound. Neurological: Negative for dizziness, weakness, light-headedness, numbness and headaches. Hematological: Negative for adenopathy. Does not bruise/bleed easily. Psychiatric/Behavioral: Negative for self-injury, sleep disturbance and suicidal ideas. The patient is not nervous/anxious.          Problem List:     Patient Active Problem List   Diagnosis   • Chronic hepatitis C without hepatic coma (HCC)   • Essential hypertension   • Hyperlipidemia associated with type 2 diabetes mellitus ([address])   • Methadone maintenance therapy patient ([address])   • Type 2 diabetes mellitus with diabetic polyneuropathy ([address])   • Diabetic ulcer of toe of left foot associated with type 2 diabetes mellitus, with bone involvement without evidence of necrosis ([address])   • Bilateral lower extremity edema   • Positive depression screening   • Mononeuropathy, unspecified   • Mixed hyperlipidemia   • Proteinuria   • Vitamin D deficiency   • Type 2 diabetes mellitus, with long-term current use of insulin ([facility])   • Acute respiratory failure with hypoxia ([facility])   • Hematuria   • Aortic thrombus ([facility])   • Prolonged Q-T interval on ECG   • Empyema lung ([facility])   • S/P AKA (above knee amputation), right ([facility])   • Cervical radiculopathy   • Chronic bilateral low back pain   • Above-knee amputation of right lower extremity ([facility])   • Drug-induced constipation   • Moderate protein-calorie malnutrition ([facility])   • Chronic pain syndrome   • Chronic hyponatremia   • Depression   • History of COVID-19   • Pulmonary fibrosis ([facility])   • Foot drop, left   • PAD (peripheral artery disease) ([facility])   • Diabetic neuropathy ([facility])   • Ulcer of left foot, limited to breakdown of skin ([facility])   • Pressure ulcer of sacral region, unstageable ([address])   • Heart failure, unspecified HF chronicity, unspecified heart failure type ([address])   • Unspecified adrenocortical insufficiency ([facility])      Past Medical and Surgical History:     Past Medical History:   Diagnosis Date   • Arthritis    • Diabetes mellitus ([address])    • DVT (deep venous thrombosis) ([facility])    • GERD (gastroesophageal reflux disease)    • History of COVID-19 2020    severe disease, intubated/ICU x 1 month   • History of drug abuse ([address])     heroin abuse, clean since 2003   • Hypercholesteremia    • Hypertension    • Ischemia of right lower extremity with suspected rhabdomyolysis 02/06/2021   • Left Hydropneumothorax 02/10/2021   • Methadone use    • Pneumonia due to COVID-19 virus 01/11/2021   • Subacute osteomyelitis of right femur ([address]) 09/13/2021   • Vascular disorder of lower extremity      Past Surgical History:   Procedure Laterality Date   • AMPUTATION ABOVE KNEE (AKA) Right 01/14/2021    Procedure: AMPUTATION ABOVE KNEE (AKA);   Surgeon: Hawk Weinstein MD;  [facility];  Service: Vascular • AMPUTATION ABOVE KNEE (AKA) Right 2021    Procedure: AMPUTATION ABOVE KNEE (AKA) FORMALIZATION,  R AKA wound washout, wound closure;  Surgeon: Julien Moreno MD;  [facility];  Service: Vascular   • ARTERIOGRAM Right 2021    Procedure: ARTERIOGRAM;  Surgeon: Mark Arana DO;  [facility];  Service: Vascular   • BRONCHOSCOPY     • IR CHEST TUBE PLACEMENT  02/10/2021   • IR LOWER EXTREMITY ANGIOGRAM  2021   • IR LOWER EXTREMITY ANGIOGRAM  2022   • ND AMPUTATION TOE METATARSOPHALANGEAL JOINT Left 2023    Procedure: AMPUTATION TOE- hallux;   Surgeon: Carlton Carbajal DPM;  Location: [facility];  Service: Podiatry   • ND Juan Carlos Legato 3RD+ ORD SLCTV ABDL [facility] Left 2022    Procedure: LEG AGRAM W/ INTERVENTION;  Surgeon: Vandana Hayes MD;  Location: [facility];  Service: Vascular   • THROMBECTOMY W/ EMBOLECTOMY Right 2021    Procedure: EMBOLECTOMY/THROMBECTOMY LOWER EXTREMITY;  Surgeon: Mark Arana DO;  Location: [facility];  Service: Vascular      Family History:     Family History   Problem Relation Age of Onset   • Diabetes Mother    • Hypertension Father    • Leukemia Father    • Acute lymphoblastic leukemia Father    • Cancer Sister       Social History:     Social History     Socioeconomic History   • Marital status:      Spouse name: None   • Number of children: None   • Years of education: None   • Highest education level: None   Occupational History   • None   Tobacco Use   • Smoking status: Some Days     Packs/day: 0.25     Types: Cigarettes     Last attempt to quit: 2022     Years since quittin.6   • Smokeless tobacco: Never   Vaping Use   • Vaping Use: Never used   Substance and Sexual Activity   • Alcohol use: Not Currently   • Drug use: Not Currently     Types: Heroin     Comment: clean since , now on methadone   • Sexual activity: None   Other Topics Concern   • None   Social History Narrative   • None     Social Determinants of Health     Financial Resource Strain: Medium Risk (7/13/2023)    Overall Financial Resource Strain (CARDIA)    • Difficulty of Paying Living Expenses: Somewhat hard   Food Insecurity: Not on file   Transportation Needs: No Transportation Needs (7/13/2023)    PRAPARE - Transportation    • Lack of Transportation (Medical): No    • Lack of Transportation (Non-Medical):  No   Physical Activity: Not on file   Stress: Not on file   Social Connections: Not on file   Intimate Partner Violence: Not on file   Housing Stability: Not on file      Medications and Allergies:     Current Outpatient Medications   Medication Sig Dispense Refill   • acetaminophen (TYLENOL) 325 mg tablet Take 3 tablets (975 mg total) by mouth every 8 (eight) hours (Patient taking differently: Take 975 mg by mouth if needed)  0   • albuterol (PROVENTIL HFA,VENTOLIN HFA) 90 mcg/act inhaler Inhale 2 puffs every 4 (four) hours as needed for wheezing 18 g 3   • apixaban (Eliquis) 5 mg Take 1 tablet (5 mg total) by mouth 2 (two) times a day 180 tablet 3   • aspirin 81 mg chewable tablet Chew 81 mg daily     • atorvastatin (LIPITOR) 20 mg tablet Take 1 tablet (20 mg total) by mouth daily 90 tablet 3   • Blood Glucose Monitoring Suppl (OneTouch Verio) w/Device KIT Use to test blood sugars 3 times daily 1 kit 0   • cholecalciferol (VITAMIN D3) 1,000 units tablet Take 2 tablets (2,000 Units total) by mouth daily 12 tablet 0   • Continuous Blood Gluc  (FreeStyle Ke 2 [place]) JOSEPH Use as directed 1 each 0   • Continuous Blood Gluc Sensor (FreeStyle Ke 2 Sensor) MISC Change every 14 days 2 each 2   • Diclofenac Sodium (VOLTAREN) 1 % Apply 4 g topically 4 (four) times a day as needed (as needed for pain) 50 g 1   • DULoxetine (CYMBALTA) 60 mg delayed release capsule Take 1 capsule (60 mg total) by mouth daily at bedtime 90 capsule 1   • Empagliflozin (Jardiance) 25 MG TABS Take 1 tablet (25 mg total) by mouth every morning 90 tablet 3   • famotidine (PEPCID) 40 MG tablet Take 1 tablet (40 mg total) by mouth daily at bedtime 30 tablet 5   • ferrous sulfate 324 (65 Fe) mg Take 1 tablet (324 mg total) by mouth daily before breakfast 30 tablet 3   • fluticasone (FLONASE) 50 mcg/act nasal spray 1 spray into each nostril daily (Patient taking differently: 1 spray into each nostril if needed) 16 g 2   • glucose blood test strip Use 1 each 3 (three) times daily after meals Use as instructed 270 each 3   • insulin lispro (HumaLOG KwikPen) 100 units/mL injection pen Blood Glucose 150 - 224: 4 Unit of Insulin Blood Glucose 225 - 299: 5 Units of Insulin Blood Glucose 300 - 374: 6 Units of Insulin Blood Glucose 375 - 449: 7 Units of Insulin Blood Glucose greater than or equal to 450: 8 Units of Insulin Max of 24 units per day 15 mL 3   • Insulin Pen Needle 31G X 5 MM MISC Use daily Pt to inject 4 X daily (Patient taking differently: Use Check sugars three times a day) 100 each 5   • Insulin Pen Needle 31G X 5 MM MISC 30 units sq 2X daily 100 each 3   • Insulin Syringe-Needle U-100 (BD Veo Insulin Syringe U/F) 31G X 15/64" 0.5 ML MISC Inject under the skin 3 (three) times a day Use as directed 300 each 5   • levocetirizine (XYZAL) 5 MG tablet Take 1 tablet (5 mg total) by mouth every evening 90 tablet 3   • lidocaine (LMX) 4 % cream Apply topically 4 (four) times a day as needed for mild pain 30 g 0   • lisinopril (ZESTRIL) 10 mg tablet Take 1 tablet (10 mg total) by mouth daily 90 tablet 3   • magnesium oxide (MAG-OX) 400 mg Take 1 tablet (400 mg total) by mouth daily 90 tablet 1   • methadone (DOLOPHINE) 10 mg tablet Take 70 mg by mouth daily,     • multivitamin-minerals (CENTRUM ADULTS) tablet Take 1 tablet by mouth daily  0   • nicotine (NICODERM CQ) 21 mg/24 hr TD 24 hr patch Place 1 patch on the skin every 24 hours 28 patch 0   • ondansetron (ZOFRAN) 4 mg tablet Take 1 tablet (4 mg total) by mouth every 8 (eight) hours as needed for nausea or vomiting 40 tablet 2   • pantoprazole (PROTONIX) 40 mg tablet Take 1 tablet (40 mg total) by mouth daily 30 tablet 2   • polyethylene glycol (GLYCOLAX) 17 GM/SCOOP powder Take 17 g by mouth 2 (two) times a day 850 g 2   • Semglee, yfgn, 100 UNIT/ML SOLN INJECT 29 UNITS SUBCUTANEOUSLY ONCE DAILY AT BEDTIME     • silver sulfadiazine (SILVADENE,SSD) 1 % cream Apply topically if needed (open wound) 400 g 3   • sitaGLIPtin-metFORMIN (Janumet)  MG per tablet Take 1 tablet by mouth 2 (two) times a day with meals 180 tablet 3   • traZODone (DESYREL) 50 mg tablet Take 1 tablet (50 mg total) by mouth daily at bedtime as needed for sleep 90 tablet 3   • naloxone (NARCAN) 4 mg/0.1 mL nasal spray Administer 1 spray into a nostril. If no response after 2-3 minutes, give another dose in the other nostril using a new spray. (Patient not taking: Reported on 7/13/2023) 1 each 1     No current facility-administered medications for this visit.      Allergies   Allergen Reactions   • Varenicline Rash      Immunizations:     Immunization History   Administered Date(s) Administered   • COVID-19 PFIZER VACCINE 0.3 ML IM 03/27/2021, 04/17/2021   • COVID-19 Pfizer vac (Yao-sucrose, gray cap) 12 yr+ IM 09/06/2022   • Hep A / Hep B 11/12/2008, 12/10/2008, 10/21/2009   • INFLUENZA 01/30/2012, 09/22/2014, 10/09/2015   • Influenza, injectable, quadrivalent, preservative free 0.5 mL 10/30/2018   • Influenza, recombinant, quadrivalent,injectable, preservative free 11/07/2019, 12/11/2020, 11/04/2021, 12/06/2022   • Influenza, seasonal, injectable 09/17/2007, 10/03/2013   • Pneumococcal Conjugate Vaccine 20-valent (Pcv20), Polysace 07/13/2023   • Pneumococcal Polysaccharide PPV23 06/17/2015   • Tdap 11/07/2019   • Tuberculin Skin Test-PPD Intradermal 01/30/2012   • Zoster Vaccine Recombinant 03/12/2020, 05/21/2020      Health Maintenance:         Topic Date Due   • Colorectal Cancer Screening  03/12/2028   • HIV Screening Completed   • Hepatitis C Screening  Discontinued         Topic Date Due   • COVID-19 Vaccine (4 - Pfizer series) 11/01/2022   • Influenza Vaccine (1) 09/01/2023      Medicare Screening Tests and Risk Assessments:     Bri Bansal is here for his Subsequent Wellness visit. Last Medicare Wellness visit information reviewed, patient interviewed and updates made to the record today. Health Risk Assessment:   Patient rates overall health as fair. Patient feels that their physical health rating is same. Patient is satisfied with their life. Eyesight was rated as same. Hearing was rated as same. Patient feels that their emotional and mental health rating is same. Patients states they are never, rarely angry. Patient states they are often unusually tired/fatigued. Pain experienced in the last 7 days has been none. Patient states that he has experienced no weight loss or gain in last 6 months. Depression Screening:   PHQ-9 Score: 0      Nutrition:   Current diet is Diabetic. Medications:   Patient is currently taking over-the-counter supplements. OTC medications include: see medication list. Patient is able to manage medications. Activities of Daily Living (ADLs)/Instrumental Activities of Daily Living (IADLs):   Walk and transfer into and out of bed and chair?: Yes  Dress and groom yourself?: Yes    Bathe or shower yourself?: Yes    Feed yourself?  Yes  Do your laundry/housekeeping?: No  Manage your money, pay your bills and track your expenses?: Yes  Make your own meals?: No    Do your own shopping?: No    Previous Hospitalizations:   Any hospitalizations or ED visits within the last 12 months?: No      Advance Care Planning:   Living will: No    Durable POA for healthcare: No    Five wishes given: Yes      PREVENTIVE SCREENINGS      Cardiovascular Screening:    General: Screening Not Indicated and History Lipid Disorder      Diabetes Screening:     General: Screening Not Indicated and History Diabetes Colorectal Cancer Screening:     General: Screening Current      Prostate Cancer Screening:    General: Screening Current      Abdominal Aortic Aneurysm (AAA) Screening:    Risk factors include: age between 70-77 yo and tobacco use        Lung Cancer Screening:     General: Screening Not Indicated      Hepatitis C Screening:    General: Screening Not Indicated and History Hepatitis C    Screening, Brief Intervention, and Referral to Treatment (SBIRT)    Screening  Typical number of drinks in a day: 0  Typical number of drinks in a week: 0  Interpretation: Low risk drinking behavior. Single Item Drug Screening:  How often have you used an illegal drug (including marijuana) or a prescription medication for non-medical reasons in the past year? never    Single Item Drug Screen Score: 0  Interpretation: Negative screen for possible drug use disorder    Review of Current Opioid Use    Opioid Risk Tool (ORT) Interpretation: Complete Opioid Risk Tool (ORT)    No results found. Physical Exam:     /70 (BP Location: Left arm)   Pulse 99   Temp 99.2 °F (37.3 °C) (Tympanic)   Ht 5' 8" (1.727 m)   SpO2 97%   BMI 26.30 kg/m²     Physical Exam  Constitutional:       General: He is not in acute distress. Appearance: Normal appearance. He is not ill-appearing or toxic-appearing. HENT:      Head: Normocephalic and atraumatic. Right Ear: External ear normal.      Left Ear: External ear normal.      Nose: Nose normal.      Mouth/Throat:      Mouth: Mucous membranes are moist.      Pharynx: Oropharynx is clear. No oropharyngeal exudate or posterior oropharyngeal erythema. Eyes:      General:         Right eye: No discharge. Left eye: No discharge. Conjunctiva/sclera: Conjunctivae normal.   Cardiovascular:      Rate and Rhythm: Normal rate and regular rhythm. Pulses: Normal pulses. Heart sounds: Normal heart sounds. No murmur heard. No friction rub. No gallop.    Pulmonary: Effort: Pulmonary effort is normal. No respiratory distress. Breath sounds: Normal breath sounds. No stridor. No wheezing, rhonchi or rales. Abdominal:      General: Abdomen is flat. Bowel sounds are normal. There is no distension. Palpations: Abdomen is soft. There is no mass. Tenderness: There is no abdominal tenderness. Musculoskeletal:         General: No swelling, tenderness, deformity or signs of injury. Normal range of motion. Cervical back: Normal range of motion and neck supple. No rigidity. No muscular tenderness. Comments: Right AKA    Lymphadenopathy:      Cervical: No cervical adenopathy. Skin:     General: Skin is warm and dry. Capillary Refill: Capillary refill takes less than 2 seconds. Coloration: Skin is not jaundiced or pale. Findings: No bruising or lesion. Neurological:      General: No focal deficit present. Mental Status: He is alert and oriented to person, place, and time. Mental status is at baseline. Sensory: No sensory deficit. Motor: No weakness. Gait: Gait normal.   Psychiatric:         Mood and Affect: Mood normal.         Behavior: Behavior normal.         Thought Content:  Thought content normal.         Judgment: Judgment normal.          VERONICA Buckner

## 2023-07-13 NOTE — PATIENT INSTRUCTIONS
Medicare Preventive Visit Patient Instructions  Thank you for completing your Welcome to Medicare Visit or Medicare Annual Wellness Visit today. Your next wellness visit will be due in one year (7/13/2024). The screening/preventive services that you may require over the next 5-10 years are detailed below. Some tests may not apply to you based off risk factors and/or age. Screening tests ordered at today's visit but not completed yet may show as past due. Also, please note that scanned in results may not display below. Preventive Screenings:  Service Recommendations Previous Testing/Comments   Colorectal Cancer Screening  · Colonoscopy    · Fecal Occult Blood Test (FOBT)/Fecal Immunochemical Test (FIT)  · Fecal DNA/Cologuard Test  · Flexible Sigmoidoscopy Age: 43-73 years old   Colonoscopy: every 10 years (May be performed more frequently if at higher risk)  OR  FOBT/FIT: every 1 year  OR  Cologuard: every 3 years  OR  Sigmoidoscopy: every 5 years  Screening may be recommended earlier than age 39 if at higher risk for colorectal cancer. Also, an individualized decision between you and your healthcare provider will decide whether screening between the ages of 77-80 would be appropriate.  Colonoscopy: 03/12/2018  FOBT/FIT: 02/04/2021  Cologuard: 02/26/2023  Sigmoidoscopy: Not on file    Screening Current     Prostate Cancer Screening Individualized decision between patient and health care provider in men between ages of 53-66   Medicare will cover every 12 months beginning on the day after your 50th birthday PSA: 0.2 ng/mL     Screening Current     Hepatitis C Screening Once for adults born between 1945 and 1965  More frequently in patients at high risk for Hepatitis C Hep C Antibody: 01/27/2023    Screening Not Indicated  History Hepatitis C   Diabetes Screening 1-2 times per year if you're at risk for diabetes or have pre-diabetes Fasting glucose: 179 mg/dL (5/27/2022)  A1C: 7.3 % (6/9/2023)  Screening Not Indicated  History Diabetes   Cholesterol Screening Once every 5 years if you don't have a lipid disorder. May order more often based on risk factors. Lipid panel: 05/25/2022  Screening Not Indicated  History Lipid Disorder      Other Preventive Screenings Covered by Medicare:  1. Abdominal Aortic Aneurysm (AAA) Screening: covered once if your at risk. You're considered to be at risk if you have a family history of AAA or a male between the age of 70-76 who smoking at least 100 cigarettes in your lifetime. 2. Lung Cancer Screening: covers low dose CT scan once per year if you meet all of the following conditions: (1) Age 48-67; (2) No signs or symptoms of lung cancer; (3) Current smoker or have quit smoking within the last 15 years; (4) You have a tobacco smoking history of at least 20 pack years (packs per day x number of years you smoked); (5) You get a written order from a healthcare provider. 3. Glaucoma Screening: covered annually if you're considered high risk: (1) You have diabetes OR (2) Family history of glaucoma OR (3)  aged 48 and older OR (3)  American aged 72 and older  3. Osteoporosis Screening: covered every 2 years if you meet one of the following conditions: (1) Have a vertebral abnormality; (2) On glucocorticoid therapy for more than 3 months; (3) Have primary hyperparathyroidism; (4) On osteoporosis medications and need to assess response to drug therapy. 5. HIV Screening: covered annually if you're between the age of 14-79. Also covered annually if you are younger than 13 and older than 72 with risk factors for HIV infection. For pregnant patients, it is covered up to 3 times per pregnancy.     Immunizations:  Immunization Recommendations   Influenza Vaccine Annual influenza vaccination during flu season is recommended for all persons aged >= 6 months who do not have contraindications   Pneumococcal Vaccine   * Pneumococcal conjugate vaccine = PCV13 (Prevnar 13), PCV15 (Vaxneuvance), PCV20 (Prevnar 20)  * Pneumococcal polysaccharide vaccine = PPSV23 (Pneumovax) Adults 2364 years old: 1-3 doses may be recommended based on certain risk factors  Adults 72 years old: 1-2 doses may be recommended based off what pneumonia vaccine you previously received   Hepatitis B Vaccine 3 dose series if at intermediate or high risk (ex: diabetes, end stage renal disease, liver disease)   Tetanus (Td) Vaccine - COST NOT COVERED BY MEDICARE PART B Following completion of primary series, a booster dose should be given every 10 years to maintain immunity against tetanus. Td may also be given as tetanus wound prophylaxis. Tdap Vaccine - COST NOT COVERED BY MEDICARE PART B Recommended at least once for all adults. For pregnant patients, recommended with each pregnancy. Shingles Vaccine (Shingrix) - COST NOT COVERED BY MEDICARE PART B  2 shot series recommended in those aged 48 and above     Health Maintenance Due:      Topic Date Due   • Colorectal Cancer Screening  03/12/2028   • HIV Screening  Completed   • Hepatitis C Screening  Discontinued     Immunizations Due:      Topic Date Due   • Pneumococcal Vaccine: 65+ Years (2 - PCV) 06/17/2016   • COVID-19 Vaccine (4 - Pfizer series) 11/01/2022   • Influenza Vaccine (1) 09/01/2023     Advance Directives   What are advance directives? Advance directives are legal documents that state your wishes and plans for medical care. These plans are made ahead of time in case you lose your ability to make decisions for yourself. Advance directives can apply to any medical decision, such as the treatments you want, and if you want to donate organs. What are the types of advance directives? There are many types of advance directives, and each state has rules about how to use them. You may choose a combination of any of the following:  · Living will: This is a written record of the treatment you want.  You can also choose which treatments you do not want, which to limit, and which to stop at a certain time. This includes surgery, medicine, IV fluid, and tube feedings. · Durable power of  for [facility]): This is a written record that states who you want to make healthcare choices for you when you are unable to make them for yourself. This person, called a proxy, is usually a family member or a friend. You may choose more than 1 proxy. · Do not resuscitate (DNR) order:  A DNR order is used in case your heart stops beating or you stop breathing. It is a request not to have certain forms of treatment, such as CPR. A DNR order may be included in other types of advance directives. · Medical directive: This covers the care that you want if you are in a coma, near death, or unable to make decisions for yourself. You can list the treatments you want for each condition. Treatment may include pain medicine, surgery, blood transfusions, dialysis, IV or tube feedings, and a ventilator (breathing machine). · Values history: This document has questions about your views, beliefs, and how you feel and think about life. This information can help others choose the care that you would choose. Why are advance directives important? An advance directive helps you control your care. Although spoken wishes may be used, it is better to have your wishes written down. Spoken wishes can be misunderstood, or not followed. Treatments may be given even if you do not want them. An advance directive may make it easier for your family to make difficult choices about your care. Cigarette Smoking and Your Health   Risks to your health if you smoke:  Nicotine and other chemicals found in tobacco damage every cell in your body. Even if you are a light smoker, you have an increased risk for cancer, heart disease, and lung disease. If you are pregnant or have diabetes, smoking increases your risk for complications.    Benefits to your health if you stop smoking:   · You decrease respiratory symptoms such as coughing, wheezing, and shortness of breath. · You reduce your risk for cancers of the lung, mouth, throat, kidney, bladder, pancreas, stomach, and cervix. If you already have cancer, you increase the benefits of chemotherapy. You also reduce your risk for cancer returning or a second cancer from developing. · You reduce your risk for heart disease, blood clots, heart attack, and stroke. · You reduce your risk for lung infections, and diseases such as pneumonia, asthma, chronic bronchitis, and emphysema. · Your circulation improves. More oxygen can be delivered to your body. If you have diabetes, you lower your risk for complications, such as kidney, artery, and eye diseases. You also lower your risk for nerve damage. Nerve damage can lead to amputations, poor vision, and blindness. · You improve your body's ability to heal and to fight infections. For more information and support to stop smoking:   · [employer]  Phone: 8- 409 - 381-5305  Web Address: www.[employer]  Weight Management   Why it is important to manage your weight:  Being overweight increases your risk of health conditions such as heart disease, high blood pressure, type 2 diabetes, and certain types of cancer. It can also increase your risk for osteoarthritis, sleep apnea, and other respiratory problems. Aim for a slow, steady weight loss. Even a small amount of weight loss can lower your risk of health problems. How to lose weight safely:  A safe and healthy way to lose weight is to eat fewer calories and get regular exercise. You can lose up about 1 pound a week by decreasing the number of calories you eat by 500 calories each day. Healthy meal plan for weight management:  A healthy meal plan includes a variety of foods, contains fewer calories, and helps you stay healthy. A healthy meal plan includes the following:  · Eat whole-grain foods more often. A healthy meal plan should contain fiber.  Fiber is the part of grains, fruits, and vegetables that is not broken down by your body. Whole-grain foods are healthy and provide extra fiber in your diet. Some examples of whole-grain foods are whole-wheat breads and pastas, oatmeal, brown rice, and bulgur. · Eat a variety of vegetables every day. Include dark, leafy greens such as spinach, kale, lorena greens, and mustard greens. Eat yellow and orange vegetables such as carrots, sweet potatoes, and winter squash. · Eat a variety of fruits every day. Choose fresh or canned fruit (canned in its own juice or light syrup) instead of juice. Fruit juice has very little or no fiber. · Eat low-fat dairy foods. Drink fat-free (skim) milk or 1% milk. Eat fat-free yogurt and low-fat cottage cheese. Try low-fat cheeses such as mozzarella and other reduced-fat cheeses. · Choose meat and other protein foods that are low in fat. Choose beans or other legumes such as split peas or lentils. Choose fish, skinless poultry (chicken or turkey), or lean cuts of red meat (beef or pork). Before you cook meat or poultry, cut off any visible fat. · Use less fat and oil. Try baking foods instead of frying them. Add less fat, such as margarine, sour cream, regular salad dressing and mayonnaise to foods. Eat fewer high-fat foods. Some examples of high-fat foods include french fries, doughnuts, ice cream, and cakes. · Eat fewer sweets. Limit foods and drinks that are high in sugar. This includes candy, cookies, regular soda, and sweetened drinks. Exercise:  Exercise at least 30 minutes per day on most days of the week. Some examples of exercise include walking, biking, dancing, and swimming. You can also fit in more physical activity by taking the stairs instead of the elevator or parking farther away from stores. Ask your healthcare provider about the best exercise plan for you.    Narcotic (Opioid) Safety    Use narcotics safely:  · Take prescribed narcotics exactly as directed  · Do not give narcotics to others or take narcotics that belong to someone else  · Do not mix narcotics without medicines or alcohol  · Do not drive or operate heavy machinery after you take the narcotic  · Monitor for side effects and notify your healthcare provider if you experienced side effects such as nausea, sleepiness, itching, or trouble thinking clearly. Manage constipation:    Constipation is the most common side effect of narcotic medicine. Constipation is when you have hard, dry bowel movements, or you go longer than usual between bowel movements. Tell your healthcare provider about all changes in your bowel movements while you are taking narcotics. He or she may recommend laxative medicine to help you have a bowel movement. He or she may also change the kind of narcotic you are taking, or change when you take it. The following are more ways you can prevent or relieve constipation:    · Drink liquids as directed. You may need to drink extra liquids to help soften and move your bowels. Ask how much liquid to drink each day and which liquids are best for you. · Eat high-fiber foods. This may help decrease constipation by adding bulk to your bowel movements. High-fiber foods include fruits, vegetables, whole-grain breads and cereals, and beans. Your healthcare provider or dietitian can help you create a high-fiber meal plan. Your provider may also recommend a fiber supplement if you cannot get enough fiber from food. · Exercise regularly. Regular physical activity can help stimulate your intestines. Walking is a good exercise to prevent or relieve constipation. Ask which exercises are best for you. · Schedule a time each day to have a bowel movement. This may help train your body to have regular bowel movements. Bend forward while you are on the toilet to help move the bowel movement out. Sit on the toilet for at least 10 minutes, even if you do not have a bowel movement.     Store narcotics safely: · Store narcotics where others cannot easily get them. Keep them in a locked cabinet or secure area. Do not  keep them in a purse or other bag you carry with you. A person may be looking for something else and find the narcotics. · Make sure narcotics are stored out of the reach of children. A child can easily overdose on narcotics. Narcotics may look like candy to a small child. The best way to dispose of narcotics: The laws vary by country and area. In the [facility] hospital, the best way is to return the narcotics through a take-back program. This program is offered by the [employer]). The following are options for using the program:  · Take the narcotics to a NELLY collection site. The site is often a law enforcement center. Call your local law enforcement center for scheduled take-back days in your area. You will be given information on where to go if the collection site is in a different location. · Take the narcotics to an approved pharmacy or hospital.  A pharmacy or hospital may be set up as a collection site. You will need to ask if it is a NELLY collection site if you were not directed there. A pharmacy or doctor's office may not be able to take back narcotics unless it is a NELLY site. · Use a mail-back system. This means you are given containers to put the narcotics into. You will then mail them in the containers. · Use a take-back drop box. This is a place to leave the narcotics at any time. People and animals will not be able to get into the box. Your local law enforcement agency can tell you where to find a drop box in your area. Other ways to manage pain:   · Ask your healthcare provider about non-narcotic medicines to control pain. Nonprescription medicines include NSAIDs (such as ibuprofen) and acetaminophen. Prescription medicines include muscle relaxers, antidepressants, and steroids. · Pain may be managed without any medicines.   Some ways to relieve pain include massage, aromatherapy, or meditation. Physical or occupational therapy may also help. For more information:   · Drug Enforcement Administration  [address]  Phone: 8- 738 - 318-8330  Web Address: [facility] [employer].. [email or web address]_disposal/    · [address] and Drug Administration  [address] , [address]  Phone: 4- 413 - 206-9964  Web Address: http://[employer]/     © Copyright [address] 2018 Information is for End User's use only and may not be sold, redistributed or otherwise used for commercial purposes.  All illustrations and images included in CareNotes® are the copyrighted property of A.D.A.M., Inc. or [address] [place] [address]

## 2023-07-14 ENCOUNTER — TELEPHONE (OUTPATIENT)
Dept: FAMILY MEDICINE CLINIC | Facility: CLINIC | Age: 63
End: 2023-07-14

## 2023-07-14 ENCOUNTER — HOME CARE VISIT (OUTPATIENT)
Dept: HOME HEALTH SERVICES | Facility: HOME HEALTHCARE | Age: 63
End: 2023-07-14
Payer: MEDICARE

## 2023-07-14 ENCOUNTER — HOME HEALTH ADMISSION (OUTPATIENT)
Dept: HOME HEALTH SERVICES | Facility: HOME HEALTHCARE | Age: 63
End: 2023-07-14
Payer: MEDICARE

## 2023-07-14 PROCEDURE — 400013 VN SOC

## 2023-07-14 PROCEDURE — 10330081 VN NO-PAY CLAIM PROCEDURE

## 2023-07-14 PROCEDURE — G0151 HHCP-SERV OF PT,EA 15 MIN: HCPCS

## 2023-07-14 NOTE — CASE COMMUNICATION
Upon diabetic foot check,   Area of blanchable pink on top of L 2nd toe. Picture in media. Is there any instructions for pt on such? We will monitor during [address] episode, L heel dry flakey skin- will monitor as well. I recommend he make appt with you for toenail care as well as you can further assess L 2nd toe.      Thank you, Cynthia Leon PT

## 2023-07-14 NOTE — CASE COMMUNICATION
Are there any WB restrictions in regards to L foot. He did not receive specialized shoes as indicated per your last office visit in [facility]. I will contact  to determine what is going on with such. Thank you, Diamond Castellano PT  ----- Message -----  From: Berto Petit DPM  Sent: 7/14/2023   2:54 PM EDT  To: Clarissa Sepulveda PT      Thank you, he recently had toe amputation so the lesser digits have destabilized. His hammerto es are worsening and he is rubbing overlying the dorsal aspect of the toe  ----- Message -----  From: Clarissa Seuplveda PT  Sent: 7/14/2023   2:32 PM EDT  To: Berto Petit DPM    Upon diabetic foot check,   Area of blanchable pink on top of L 2nd toe. Picture in media. Is there any instructions for pt on such? We will monitor [facility] episode, L heel dry flakey skin- will monitor as well. I recommend he make appt wi th you for toenail care as well as you can further assess L 2nd toe.      Thank you, Delilah Bence Bentley Hale PT

## 2023-07-14 NOTE — CASE COMMUNICATION
[facility] with the following disciplines:      SN and PT  Response needed, please respond via inbasket or tiger text  Primary focus of home health care musculoskeletal.  Patient stated goals of care: pt/caregiver- build up strength to get to gait training with leg. Anticipated visit pattern and next visit date: PT for 1wk4   See medication list - meds in home differ from AVS:  -MAURI OR interactions: Zofran & Methodone; Aspirin & Eliquis; Voltaren cream & Eliquis, Trazodone & Duloxetine.  -Following meds pt taking OTC not on AVS- are these ok for him to take?--- Minoxidil Topical (Rogaine) 5% 1 mL BID PRN for hair growth; Rephresh Tears 0.9% 2drops each affected eye PRN lubrication; RYAN 1000mg chew 2-3tab PRN for heartburn; Zinc 50mg with Vitamin C 250mg 1 tab daily PRN if not feeling well; Colace 100mg 1 tab BID. -Different dosings: Vitamin D3 has in home 5,000unit 1 tab daily (AVS has 1,000unit 2tab daily); Methodone unable to see & listed 10mg- pt reports taking 70 mg liquid daily; Glycolax- pt taking 1 time per day instead of BID as due to diarrhea; famotodine taking 1 --- bottle has 1/2- reports bottle incorrect.  -Pt does not have in home & reports at pharmacy: xyzal, Magnesium oxide, Iron.   -Not in home - lidocaine cream.  -Meds in home   & needs new script  for flonase & Albuterol inhaler. Significant clinical findings:   -? stage 2 pressure injury R buttocks. recommend home RN to eval & treat.  -blanchable pink on top L 2nd toe- sent inbasket to podiatrist Dr Leanna Gallo. Potential barriers to goal achievement: R AKA, comorbidities. Other pertinent information: mod nutritional risk. PHQ 2 2. L heel flaky dry skin- will monitor.   - has not been wearing R A KA - recommend he start. -BP initially 138/100  L wrist then 120/88 RUE. -Temp 99.8deg. Pt denies fever/chills; very warm in home- pt sweating. .        Thank you for allowing us to participate in the care of your patient.     Diamond Molina PT

## 2023-07-14 NOTE — TELEPHONE ENCOUNTER
Diamond from [facility] called to let you know she saw the patient today for home health PT. She is admitting him. She wanted to make you aware he has a open area on his backside. She thinks its a stage 2 pressure pressure injury. She is having nursing in to take a look at it and see what they suggest. She also said there was an area on the left 2nd toe that she is sending to his foot doctor but wanted to make you aware.

## 2023-07-15 VITALS
OXYGEN SATURATION: 96 % | TEMPERATURE: 99.8 F | SYSTOLIC BLOOD PRESSURE: 138 MMHG | HEART RATE: 107 BPM | DIASTOLIC BLOOD PRESSURE: 100 MMHG

## 2023-07-17 ENCOUNTER — HOME CARE VISIT (OUTPATIENT)
Dept: HOME HEALTH SERVICES | Facility: HOME HEALTHCARE | Age: 63
End: 2023-07-17
Payer: MEDICARE

## 2023-07-18 ENCOUNTER — HOME CARE VISIT (OUTPATIENT)
Dept: HOME HEALTH SERVICES | Facility: HOME HEALTHCARE | Age: 63
End: 2023-07-18
Payer: MEDICARE

## 2023-07-18 VITALS
OXYGEN SATURATION: 96 % | TEMPERATURE: 97.6 F | HEART RATE: 108 BPM | RESPIRATION RATE: 20 BRPM | DIASTOLIC BLOOD PRESSURE: 84 MMHG | SYSTOLIC BLOOD PRESSURE: 128 MMHG

## 2023-07-18 PROCEDURE — G0299 HHS/HOSPICE OF RN EA 15 MIN: HCPCS

## 2023-07-19 ENCOUNTER — HOME CARE VISIT (OUTPATIENT)
Dept: HOME HEALTH SERVICES | Facility: HOME HEALTHCARE | Age: 63
End: 2023-07-19
Payer: MEDICARE

## 2023-07-19 VITALS — SYSTOLIC BLOOD PRESSURE: 130 MMHG | DIASTOLIC BLOOD PRESSURE: 90 MMHG

## 2023-07-19 PROCEDURE — G0180 MD CERTIFICATION HHA PATIENT: HCPCS | Performed by: NURSE PRACTITIONER

## 2023-07-19 PROCEDURE — G0151 HHCP-SERV OF PT,EA 15 MIN: HCPCS

## 2023-07-19 NOTE — CASE COMMUNICATION
----- Message -----  From: Sanford Spurling, CRNP  Sent: 2023   8:42 AM EDT  To: Tang Martinez PT      Yes okay for him to take the OTC medications. Thanks   ----- Message -----  From: Tang Martinez PT  Sent: 2023   2:32 PM EDT  To: Sanford Spurling, CRNP; [facility] has admitted your patient to [facility] service with the following disciplines:      SN and PT  Response needed, please respond via inbask et or tiger text  Primary focus of home health care musculoskeletal.  Patient stated goals of care: pt/caregiver- build up strength to get to gait training with leg. Anticipated visit pattern and next visit date: PT for 1wk4   See medication list - meds in home differ from AVS:  -MAJOR interactions: Zofran & Methodone; Aspirin & Eliquis; Voltaren cream & Eliquis, Trazodone & Duloxetine.  -Following meds pt taking OTC not on AVS- are t hese ok for him to take?--- Minoxidil Topical (Rogaine) 5% 1 mL BID PRN for hair growth; Rephresh Tears 0.9% 2drops each affected eye PRN lubrication; RYAN 1000mg chew 2-3tab PRN for heartburn; Zinc 50mg with Vitamin C 250mg 1 tab daily PRN if not feeling well; Colace 100mg 1 tab BID. -Different dosings: Vitamin D3 has in home 5,000unit 1 tab daily (AVS has 1,000unit 2tab daily); Methodone unable to see & listed 10mg- pt reports taking  70 mg liquid daily; Glycolax- pt taking 1 time per day instead of BID as due to diarrhea; famotodine taking 1 --- bottle has 1/2- reports bottle incorrect.  -Pt does not have in home & reports at pharmacy: xyzal, Magnesium oxide, Iron.   -Not in home - lidocaine cream.  -Meds in home  & needs new script  for flonase & Albuterol inhaler. Significant clinical findings:   -? stage 2 pressure injury R buttocks. recommend home  RN to eval & treat.  -blanchable pink on top L 2nd toe- sent inbasket to podiatrist Dr Viridiana Hedrick. Potential barriers to goal achievement: R AKA, comorbidities.    Other pertinent information: mod nutritional risk. PHQ 2 2. L heel flaky dry skin- will monitor.   - has not been wearing R AKA - recommend he start. -BP initially 138/100  L wrist then 120/88 RUE. -Temp 99.8deg. Pt denies fever/chills; very warm in home- pt sweating. . Thank you for allowing us to participate in the care of your patient.     Diamond Smith PT

## 2023-07-20 NOTE — CASE COMMUNICATION
Dr. Sonya Currie -sending order for you to sign for MSW. Pt looking into possible CCCT , nephew to be paid caregiver. They are confused with info provided. Dr. Glo Ziegler- pinker on top L 2nd top- still blanchable. light pink distal L 2nd toe. PHys ther recommends him to make appt with Dr. Davis Gonzalez. Phys ther did get in touch with  for customized L shoe. MSW- eval  for CCCt,? meals on wheels, caregiver assist-  possible nep hew to be paid caregiver.      Thank you, Kate Rutledge Friend PT

## 2023-07-25 ENCOUNTER — HOME CARE VISIT (OUTPATIENT)
Dept: HOME HEALTH SERVICES | Facility: HOME HEALTHCARE | Age: 63
End: 2023-07-25
Payer: MEDICARE

## 2023-07-25 VITALS
OXYGEN SATURATION: 97 % | HEART RATE: 96 BPM | SYSTOLIC BLOOD PRESSURE: 148 MMHG | DIASTOLIC BLOOD PRESSURE: 80 MMHG | TEMPERATURE: 97 F | RESPIRATION RATE: 18 BRPM

## 2023-07-25 PROCEDURE — G0299 HHS/HOSPICE OF RN EA 15 MIN: HCPCS

## 2023-07-26 ENCOUNTER — HOME CARE VISIT (OUTPATIENT)
Dept: HOME HEALTH SERVICES | Facility: HOME HEALTHCARE | Age: 63
End: 2023-07-26
Payer: MEDICARE

## 2023-07-26 PROCEDURE — G0151 HHCP-SERV OF PT,EA 15 MIN: HCPCS

## 2023-07-27 VITALS — SYSTOLIC BLOOD PRESSURE: 150 MMHG | HEART RATE: 80 BPM | DIASTOLIC BLOOD PRESSURE: 90 MMHG

## 2023-07-27 NOTE — CASE COMMUNICATION
plan for [facility] d/c on Wed 8/2. Pt to progress to OPT at [facility] for amputee training. Jody Najera- can you please send script to that facility for OPT.      Thank you, Umm Quintanilla Shi PT

## 2023-07-28 ENCOUNTER — HOME CARE VISIT (OUTPATIENT)
Dept: HOME HEALTH SERVICES | Facility: HOME HEALTHCARE | Age: 63
End: 2023-07-28
Payer: MEDICARE

## 2023-07-29 DIAGNOSIS — K21.9 GASTROESOPHAGEAL REFLUX DISEASE, UNSPECIFIED WHETHER ESOPHAGITIS PRESENT: ICD-10-CM

## 2023-07-31 DIAGNOSIS — Z89.9 AMPUTEE: Primary | ICD-10-CM

## 2023-07-31 RX ORDER — PANTOPRAZOLE SODIUM 40 MG/1
40 TABLET, DELAYED RELEASE ORAL DAILY
Qty: 30 TABLET | Refills: 0 | Status: SHIPPED | OUTPATIENT
Start: 2023-07-31

## 2023-08-01 ENCOUNTER — HOME CARE VISIT (OUTPATIENT)
Dept: HOME HEALTH SERVICES | Facility: HOME HEALTHCARE | Age: 63
End: 2023-08-01
Payer: MEDICARE

## 2023-08-01 VITALS
OXYGEN SATURATION: 95 % | HEART RATE: 62 BPM | DIASTOLIC BLOOD PRESSURE: 62 MMHG | RESPIRATION RATE: 16 BRPM | SYSTOLIC BLOOD PRESSURE: 104 MMHG | TEMPERATURE: 97.9 F

## 2023-08-01 PROCEDURE — G0299 HHS/HOSPICE OF RN EA 15 MIN: HCPCS

## 2023-08-02 ENCOUNTER — HOME CARE VISIT (OUTPATIENT)
Dept: HOME HEALTH SERVICES | Facility: HOME HEALTHCARE | Age: 63
End: 2023-08-02
Payer: MEDICARE

## 2023-08-02 VITALS — DIASTOLIC BLOOD PRESSURE: 90 MMHG | SYSTOLIC BLOOD PRESSURE: 140 MMHG

## 2023-08-02 DIAGNOSIS — K21.9 GASTROESOPHAGEAL REFLUX DISEASE, UNSPECIFIED WHETHER ESOPHAGITIS PRESENT: ICD-10-CM

## 2023-08-02 PROCEDURE — G0151 HHCP-SERV OF PT,EA 15 MIN: HCPCS

## 2023-08-02 RX ORDER — FAMOTIDINE 40 MG/1
40 TABLET, FILM COATED ORAL
Qty: 30 TABLET | Refills: 1 | Status: SHIPPED | OUTPATIENT
Start: 2023-08-02 | End: 2023-09-25 | Stop reason: SDUPTHER

## 2023-08-02 NOTE — TELEPHONE ENCOUNTER
Patients GI provider:  ANKIT Barrera     Number to return call: [phone]  Reason for call: Pt calling regarding a problem with his famotidine. Pt states he should take 1 40mg per day and the script states 1/2 a pill.  Pt would like a call back    Scheduled procedure/appointment date if applicable: Apt/procedure 8/15/2023

## 2023-08-03 ENCOUNTER — HOME CARE VISIT (OUTPATIENT)
Dept: HOME HEALTH SERVICES | Facility: HOME HEALTHCARE | Age: 63
End: 2023-08-03
Payer: MEDICARE

## 2023-08-03 NOTE — CASE COMMUNICATION
Pt continue with blanchable redness with white blister in center on L 2nd toe. Pt has older L AFO in shoe- unsure if that is appopriate for him to wear between L big toe amputation and redness on L 2nd toe. If need to order new AFO, please submit to [facility].      Thank you, Blank Alegria PT

## 2023-08-03 NOTE — CASE COMMUNICATION
D/C [facility] this date. Please make sure script for OPT sent to [address] [place] [address] to progress with therapy.      Thank you, Yared Flores PT

## 2023-08-04 ENCOUNTER — OFFICE VISIT (OUTPATIENT)
Dept: PODIATRY | Facility: CLINIC | Age: 63
End: 2023-08-04
Payer: MEDICARE

## 2023-08-04 VITALS
HEIGHT: 68 IN | SYSTOLIC BLOOD PRESSURE: 103 MMHG | WEIGHT: 170 LBS | BODY MASS INDEX: 25.76 KG/M2 | DIASTOLIC BLOOD PRESSURE: 63 MMHG | HEART RATE: 92 BPM

## 2023-08-04 DIAGNOSIS — L97.521 SKIN ULCER OF TOE OF LEFT FOOT, LIMITED TO BREAKDOWN OF SKIN (HCC): Primary | ICD-10-CM

## 2023-08-04 DIAGNOSIS — M20.42 HAMMER TOE OF LEFT FOOT: ICD-10-CM

## 2023-08-04 DIAGNOSIS — E11.49 OTHER DIABETIC NEUROLOGICAL COMPLICATION ASSOCIATED WITH TYPE 2 DIABETES MELLITUS (HCC): ICD-10-CM

## 2023-08-04 PROCEDURE — 99213 OFFICE O/P EST LOW 20 MIN: CPT | Performed by: PODIATRIST

## 2023-08-04 NOTE — PROGRESS NOTES
Diabetic Foot Exam    Patient's shoes and socks were not removed. Right Foot/Ankle   Right Foot Inspection  Amputation: amputation right foot (Comments: R AKA)    Sensory   Vibration: absent  Proprioception: absent  Monofilament testing: absent    Left Foot/Ankle  Left Foot Inspection  Amputation: amputation left foot (Comments: L Hallux )    Sensory   Vibration: absent  Proprioception: absent  Monofilament testing: absent    Vascular  Capillary refills: elevated  The left DP pulse is 1+. The left PT pulse is 0. Assign Risk Category  Deformity present  Loss of protective sensation  Weak pulses  Risk: 3    Assessment/Plan:    No problem-specific Assessment & Plan notes found for this encounter. Diagnoses and all orders for this visit:    Skin ulcer of toe of left foot, limited to breakdown of skin (HCC)  -     Diabetic Shoe Inserts  -     Diabetic Shoe    Hammer toe of left foot  -     Diabetic Shoe Inserts  -     Diabetic Shoe    Other diabetic neurological complication associated with type 2 diabetes mellitus ([address])  -     Diabetic Shoe Inserts  -     Diabetic Shoe      - no debridement  - to keep covered with dermagran, bandaid, change qd  - WBAT in surgical shoe  -RTC 3 weeks for repeat check of wound at risk foot care and to consider intervention on hammertoe to prevent ulceration. Subjective:      Patient ID: Mango Walls is a 70 y.o. male. PAtient presents for evaluation and management of his new rub area on the ramiro of his L 2nd toe, minimal drainage, but mainly is barefoot and ahsnt gotten his new DM shoes after losing his big toe yet. The following portions of the patient's history were reviewed and updated as appropriate: allergies, current medications, past family history, past medical history, past social history, past surgical history and problem list.    Review of Systems   Constitutional: Negative for chills and fever. HENT: Negative for ear pain and sore throat. Eyes: Negative for pain and visual disturbance. Respiratory: Negative for cough and shortness of breath. Cardiovascular: Negative for chest pain and palpitations. Gastrointestinal: Negative for abdominal pain and vomiting. Genitourinary: Negative for dysuria and hematuria. Musculoskeletal: Negative for arthralgias and back pain. Skin: Negative for color change and rash. Neurological: Negative for seizures and syncope. All other systems reviewed and are negative. Objective:      /63 (BP Location: Left arm, Patient Position: Sitting, Cuff Size: Standard)   Pulse 92   Ht 5' 8" (1.727 m) Comment: verbal, wheelchair  Wt 77.1 kg (170 lb) Comment: verbal, wheelchair  BMI 25.85 kg/m²          Physical Exam  Cardiovascular:      Pulses: Pulses are weak. Dorsalis pedis pulses are 1+ on the left side. Posterior tibial pulses are 0 on the left side. Musculoskeletal:      Comments: Rub area on dorsal claw toe is 0.2x0.2x0.1cm with minimal drainage    Rigid hammertoe to the 2,3 and semi rigid 4,5, digits.     Feet:      Right foot: amputated     Left foot: amputated

## 2023-08-07 ENCOUNTER — TELEPHONE (OUTPATIENT)
Dept: FAMILY MEDICINE CLINIC | Facility: CLINIC | Age: 63
End: 2023-08-07

## 2023-08-07 NOTE — TELEPHONE ENCOUNTER
Jayjay Currie from GI called to ask if it is ok for patient to be on a blood thinner hold for his procedure on Tuesday 8/15? He is getting a colonoscopy and EGD. They are asking he hold his blood thinners Sunday, Monday, and Tuesday. Is that all right? Please let me know so I can call the patient to have him hold his blood thinners.

## 2023-08-09 NOTE — TELEPHONE ENCOUNTER
Called and spoke with Soledad Duffy, patients caregiver and he is aware. He is also going to speak with patients vascular doctor to make sure this is ok.

## 2023-08-10 ENCOUNTER — OFFICE VISIT (OUTPATIENT)
Dept: ENDOCRINOLOGY | Facility: CLINIC | Age: 63
End: 2023-08-10
Payer: MEDICARE

## 2023-08-10 VITALS
DIASTOLIC BLOOD PRESSURE: 64 MMHG | BODY MASS INDEX: 25.85 KG/M2 | HEART RATE: 90 BPM | SYSTOLIC BLOOD PRESSURE: 118 MMHG | HEIGHT: 68 IN | OXYGEN SATURATION: 95 %

## 2023-08-10 DIAGNOSIS — E11.65 TYPE 2 DIABETES MELLITUS WITH HYPERGLYCEMIA, WITH LONG-TERM CURRENT USE OF INSULIN (HCC): Primary | ICD-10-CM

## 2023-08-10 DIAGNOSIS — E78.2 MIXED HYPERLIPIDEMIA: ICD-10-CM

## 2023-08-10 DIAGNOSIS — Z79.4 TYPE 2 DIABETES MELLITUS WITH HYPERGLYCEMIA, WITH LONG-TERM CURRENT USE OF INSULIN (HCC): Primary | ICD-10-CM

## 2023-08-10 DIAGNOSIS — E11.42 TYPE 2 DIABETES MELLITUS WITH DIABETIC POLYNEUROPATHY, WITH LONG-TERM CURRENT USE OF INSULIN (HCC): ICD-10-CM

## 2023-08-10 DIAGNOSIS — Z79.4 TYPE 2 DIABETES MELLITUS WITH DIABETIC POLYNEUROPATHY, WITH LONG-TERM CURRENT USE OF INSULIN (HCC): ICD-10-CM

## 2023-08-10 DIAGNOSIS — I10 ESSENTIAL HYPERTENSION: ICD-10-CM

## 2023-08-10 PROCEDURE — 99214 OFFICE O/P EST MOD 30 MIN: CPT | Performed by: INTERNAL MEDICINE

## 2023-08-10 PROCEDURE — 95251 CONT GLUC MNTR ANALYSIS I&R: CPT | Performed by: INTERNAL MEDICINE

## 2023-08-10 RX ORDER — INSULIN LISPRO 100 [IU]/ML
INJECTION, SOLUTION INTRAVENOUS; SUBCUTANEOUS
Qty: 15 ML | Refills: 3
Start: 2023-08-10

## 2023-08-10 RX ORDER — INSULIN GLARGINE-YFGN 100 [IU]/ML
INJECTION, SOLUTION SUBCUTANEOUS
Start: 2023-08-10

## 2023-08-10 NOTE — PATIENT INSTRUCTIONS
INSULIN DOSAGE INSTRUCTIONS    Name: Nesha Becerra                        : 1960  MRN #: [id number]    Your Current Insulin  and dose is: Before Breakfast Before Lunch Before Evening Meal Bedtime     Humalog Insulin     5 units       5 units     Regular, Apidra, Humalog orNovolog Sliding Scale:   <80              151-200 +  + +    201-250 + 1 + +1 +   251-300 + 2 + +2 +   301-350 + 3 + +3 +   >350 + 4 + +4 +     Novolog 70/30 Insulin  Humalog Mix 75/25 Insulin  Novolin 70/30 Insulin         NPH Insulin       Lantus Insulin  Levemir Insulin (*see below)         Additional Instructions:   Please test your blood sugar:  _4_ Times per day. X_ Before Breakfast                _ Alternate Testing  X_ Before Lunch                _ 2 Hours After  Meal  X_ Before Evening Meal               _ 3 a.m.  x_ Before Bedtime Snack     Target Blood sugar range _70_to _140__. Call if your  blood sugar is less than _60_ or greater than _400__. Today's Date: 8/10/2023   Hypoglycemia instructions   Nesha Becerra  8/10/2023  [id number]    Low Blood Sugar    Steps to treat low blood sugar. 1. Test blood sugar if you have symptoms of low blood sugar:   Low Blood Sugar Symptoms:  o Sweaty  o Dizzy  o Rapid heartbeat  o Shaky    o Bad mood  o Hungry      2. Treat blood sugar less than 70 with 15 grams of fast-acting carbohydrate:   Examples of 15 grams Fast-Acting Carbohydrate:  o 4 oz juice  o 4 oz regular soda  o 3-4 glucose tablets (chew)  o 3-4 hard candies (chew)              3.   Wait 15 minutes and test your blood sugar again           4.  If blood sugar is less than 100, repeat steps 2-3.      5. When your blood sugar is 100 or more, eat a snack if it will be longer than one hour until your next meal. The snack should be 15 grams of carbohydrate and a protein:   Examples of snacks:  o ½ sandwich  o 6 crackers with cheese  o Piece of fruit with cheese or peanut butter  o 6 crackers with peanut butter

## 2023-08-10 NOTE — PROGRESS NOTES
Jose C Erickson 70 y.o. male MRN: [id number]    Encounter: [id number]      Assessment/Plan     Assessment: This is a 70y.o.-year-old male with diabetes with hyperglycemia. Plan:    Diagnoses and all orders for this visit:    Type 2 diabetes mellitus with hyperglycemia, with long-term current use of insulin ([address])  Lab Results   Component Value Date    HGBA1C 7.3 (H) 2023   A1c is 7.3%, well-controlled. As per continuous glucose monitor sensor download review for past 2 weeks blood sugars are elevated after brunch and after dinner, overall blood sugars are in 160 to 180 mg per DL range  Will increase Semglee to 30 units at dinner  Discussed to take Humalog 5 units with brunch and 5 units with dinner plus sliding scale. Sliding scale was provided to patient. Continue Jardiance 25 mg daily, Janumet  mg, 1 tablet twice a day    Discussed hypoglycemia symptoms and treatment. Continue lifestyle modifications  Discussed the importance of follow-up with podiatry as well as ophthalmology    -     Semglee, yfgn, 100 UNIT/ML SOLN; Inject 30 units at dinner  -     Basic metabolic panel; Future  -     Hemoglobin A1C; Future  -     Albumin / creatinine urine ratio; Future    Essential hypertension  Blood pressure is well-controlled. Continue current management    Mixed hyperlipidemia  Lab Results   Component Value Date    LDLCALC 39 2022   Continue statins    Type 2 diabetes mellitus with diabetic polyneuropathy, with long-term current use of insulin ([facility])  Adjustment made to insulin regimen. Discussed the goal for blood sugar is 80 to 180 mg per DL range. -     insulin lispro (HumaLOG KwikPen) 100 units/mL injection pen;  Inject 5 units with  brunch and 5 units with dinner +scale        INSULIN DOSAGE INSTRUCTIONS    Name: Jose C Erickson                        : 1960  MRN #: [id number]    Your Current Insulin  and dose is: Before Breakfast Before Lunch Before Evening Meal Bedtime Humalog Insulin     5 units       5 units     Regular, Apidra, Humalog orNovolog Sliding Scale:   <80              151-200 +  + +    201-250 + 1 + +1 +   251-300 + 2 + +2 +   301-350 + 3 + +3 +   >350 + 4 + +4 +     Novolog 70/30 Insulin  Humalog Mix 75/25 Insulin  Novolin 70/30 Insulin         NPH Insulin       Lantus Insulin  Levemir Insulin (*see below)         Additional Instructions:   Please test your blood sugar:  _4_ Times per day. X_ Before Breakfast                _ Alternate Testing  X_ Before Lunch                _ 2 Hours After  Meal  X_ Before Evening Meal               _ 3 a.m.  x_ Before Bedtime Snack     Target Blood sugar range _70_to _140__. Call if your  blood sugar is less than _60_ or greater than _400__. CC: Diabetes    History of Present Illness     HPI:    Natanael Troncoso is 53-year-old male with medical history of type 2 diabetes with long-term insulin use is here for follow-up. Diabetes course has been stable. Admits complications of diabetes such as neuropathy, right AKA, gastroparesis. Denies any recent hospitalization for hyperglycemia, hypoglycemia or other medical illnesses. Checks blood sugars 4 times daily. He uses continuous glucose monitor sensor by Ulises Chin, 2 weeks overview from July 28, 2023 to August 10, 2023 reviewed  More than 72 hours of data reviewed  Average glucose is 167 mg per DL, GMI 7.3%, glucose variability is 32.6%  68% blood sugars are in target range, 0% blood sugars are low, 23% blood sugars are high, 9% blood sugars are very high    Patient has pattern of elevated blood sugars usually after brunch and after dinner       Regimen is Semglee 29 units at bedtime Humalog as per scale, Jardiance 25 mg daily, Janumet 50/1000 mg twice a day.   He takes NovoLog 4 units with brunch and 4 units with dinner-blood sugar is elevated above 150 mg per DL  He admits compliance with medications    Ophthalmology appointment, due now last appointment was in August 2022  Podiatry appointment is due. Last A1c 7.3%    For hyperlipidemia, he takes Lipitor 20 mg daily,  For vitamin D deficiency, he takes vitamin D3 supplementation 2000 IU daily  For hypertension, he takes lisinopril 10 mg daily      Lab Results   Component Value Date    HGBA1C 7.3 (H) 06/09/2023       BP Readings from Last 3 Encounters:   08/10/23 118/64   08/04/23 103/63   08/02/23 140/90        Review of Systems   Constitutional: Positive for activity change (Patient is wheelchair-bound, has history of right AKA). Negative for diaphoresis, fatigue, fever and unexpected weight change. HENT: Negative. Eyes: Negative for visual disturbance. Respiratory: Negative for cough, chest tightness and shortness of breath. Cardiovascular: Negative for chest pain, palpitations and leg swelling. Gastrointestinal: Negative for abdominal pain, blood in stool, constipation, diarrhea, nausea and vomiting. Endocrine: Negative for cold intolerance, heat intolerance, polydipsia, polyphagia and polyuria. Genitourinary: Negative for dysuria, enuresis, frequency and urgency. Musculoskeletal: Negative for arthralgias and myalgias. Skin: Negative for pallor, rash and wound. Allergic/Immunologic: Negative. Neurological: Negative for dizziness, tremors, weakness and numbness. Hematological: Negative. Psychiatric/Behavioral: Negative.         Historical Information   Past Medical History:   Diagnosis Date   • Arthritis    • Diabetes mellitus ([address])    • DVT (deep venous thrombosis) ([facility])    • GERD (gastroesophageal reflux disease)    • History of COVID-19 2020    severe disease, intubated/ICU x 1 month   • History of drug abuse ([address])     heroin abuse, clean since 2003   • Hypercholesteremia    • Hypertension    • Ischemia of right lower extremity with suspected rhabdomyolysis 02/06/2021   • Left Hydropneumothorax 02/10/2021   • Methadone use    • Pneumonia due to COVID-19 virus 01/11/2021   • Subacute osteomyelitis of right femur ([address]) 2021   • Vascular disorder of lower extremity      Past Surgical History:   Procedure Laterality Date   • AMPUTATION ABOVE KNEE (AKA) Right 2021    Procedure: AMPUTATION ABOVE KNEE (AKA); Surgeon: Brooks Greenfield MD;  [facility]: [facility];  Service: Vascular   • AMPUTATION ABOVE KNEE (AKA) Right 2021    Procedure: AMPUTATION ABOVE KNEE (AKA) FORMALIZATION,  R AKA wound washout, wound closure;  Surgeon: Brooks Greenfield MD;  [facility];  Service: Vascular   • ARTERIOGRAM Right 2021    Procedure: ARTERIOGRAM;  Surgeon: Marilyn Choudhury DO;  [facility];  Service: Vascular   • BRONCHOSCOPY     • IR CHEST TUBE PLACEMENT  02/10/2021   • IR LOWER EXTREMITY ANGIOGRAM  2021   • IR LOWER EXTREMITY ANGIOGRAM  2022   • UT AMPUTATION TOE METATARSOPHALANGEAL JOINT Left 2023    Procedure: AMPUTATION TOE- hallux;   Surgeon: Wen Barnard DPM;  Location: [facility];  Service: Podiatry   • UT Geradine Journey 3RD+ ORD SLCTV ABDL PEL/[facility] Left 2022    Procedure: LEG AGRAM W/ INTERVENTION;  Surgeon: Selina Nova MD;  Location: [facility];  Service: Vascular   • THROMBECTOMY W/ EMBOLECTOMY Right 2021    Procedure: EMBOLECTOMY/THROMBECTOMY LOWER EXTREMITY;  Surgeon: Marilyn Choudhury DO;  [facility];  Service: Vascular     Social History   Social History     Substance and Sexual Activity   Alcohol Use Not Currently     Social History     Substance and Sexual Activity   Drug Use Not Currently   • Types: Heroin    Comment: clean since , now on methadone     Social History     Tobacco Use   Smoking Status Some Days   • Packs/day: 0.25   • Types: Cigarettes   • Last attempt to quit: 2022   • Years since quittin.7   Smokeless Tobacco Never     Family History:   Family History   Problem Relation Age of Onset   • Diabetes Mother    • Hypertension Father    • Leukemia Father    • Acute lymphoblastic leukemia Father    • Cancer Sister        Meds/Allergies   Current Outpatient Medications   Medication Sig Dispense Refill   • albuterol (PROVENTIL HFA,VENTOLIN HFA) 90 mcg/act inhaler Inhale 2 puffs every 4 (four) hours as needed for wheezing 18 g 3   • apixaban (Eliquis) 5 mg Take 1 tablet (5 mg total) by mouth 2 (two) times a day 180 tablet 3   • aspirin 81 mg chewable tablet Chew 81 mg daily     • atorvastatin (LIPITOR) 20 mg tablet Take 1 tablet (20 mg total) by mouth daily 90 tablet 3   • Blood Glucose Monitoring Suppl (OneTouch Verio) w/Device KIT Use to test blood sugars 3 times daily 1 kit 0   • cholecalciferol (VITAMIN D3) 1,000 units tablet Take 2 tablets (2,000 Units total) by mouth daily (Patient taking differently: Take 2,000 Units by mouth daily Patient has 5,000 unit 1x daily in home.) 12 tablet 0   • Continuous Blood Gluc  (FreeStyle Jonn Klippel 2 [place]) JOSEPH Use as directed 1 each 0   • Continuous Blood Gluc Sensor (FreeStyle Ke 2 Sensor) MISC Change every 14 days 2 each 2   • Diclofenac Sodium (VOLTAREN) 1 % Apply 4 g topically 4 (four) times a day as needed (as needed for pain) 50 g 1   • DULoxetine (CYMBALTA) 60 mg delayed release capsule Take 1 capsule (60 mg total) by mouth daily at bedtime 90 capsule 1   • Empagliflozin (Jardiance) 25 MG TABS Take 1 tablet (25 mg total) by mouth every morning (Patient taking differently: Take 25 mg by mouth every morning NOT IN HOME) 90 tablet 3   • famotidine (PEPCID) 40 MG tablet Take 1 tablet (40 mg total) by mouth daily at bedtime 30 tablet 1   • ferrous sulfate 324 (65 Fe) mg Take 1 tablet (324 mg total) by mouth daily before breakfast 30 tablet 3   • fluticasone (FLONASE) 50 mcg/act nasal spray 1 spray into each nostril daily 16 g 2   • glucose blood test strip Use 1 each 3 (three) times daily after meals Use as instructed 270 each 3   • insulin lispro (HumaLOG KwikPen) 100 units/mL injection pen Inject 5 units with  brunch and 5 units with dinner +scale 15 mL 3   • Insulin Pen Needle 31G X 5 MM MISC Use daily Pt to inject 4 X daily (Patient taking differently: Use Check sugars three times a day) 100 each 5   • Insulin Pen Needle 31G X 5 MM MISC 30 units sq 2X daily 100 each 3   • Insulin Syringe-Needle U-100 (BD Veo Insulin Syringe U/F) 31G X 15/64" 0.5 ML MISC Inject under the skin 3 (three) times a day Use as directed 300 each 5   • levocetirizine (XYZAL) 5 MG tablet Take 1 tablet (5 mg total) by mouth every evening (Patient taking differently: Take 5 mg by mouth every evening NOT IN HOME) 90 tablet 3   • lidocaine (LMX) 4 % cream Apply topically 4 (four) times a day as needed for mild pain 30 g 0   • lisinopril (ZESTRIL) 10 mg tablet Take 1 tablet (10 mg total) by mouth daily 90 tablet 3   • magnesium oxide (MAG-OX) 400 mg Take 1 tablet (400 mg total) by mouth daily 90 tablet 1   • methadone (DOLOPHINE) 10 mg tablet Take 70 mg by mouth daily,     • multivitamin-minerals (CENTRUM ADULTS) tablet Take 1 tablet by mouth daily  0   • nicotine (NICODERM CQ) 21 mg/24 hr TD 24 hr patch Place 1 patch on the skin every 24 hours 28 patch 0   • ondansetron (ZOFRAN) 4 mg tablet Take 1 tablet (4 mg total) by mouth every 8 (eight) hours as needed for nausea or vomiting 40 tablet 2   • pantoprazole (PROTONIX) 40 mg tablet Take 1 tablet by mouth once daily 30 tablet 0   • polyethylene glycol (GLYCOLAX) 17 GM/SCOOP powder Take 17 g by mouth 2 (two) times a day (Patient taking differently: Take 17 g by mouth 2 (two) times a day pt only taking 1 x daily) 850 g 2   • Semglee, yfgn, 100 UNIT/ML SOLN Inject 30 units at dinner     • silver sulfadiazine (SILVADENE,SSD) 1 % cream Apply topically if needed (open wound) 400 g 3   • sitaGLIPtin-metFORMIN (Janumet)  MG per tablet Take 1 tablet by mouth 2 (two) times a day with meals 180 tablet 3   • traZODone (DESYREL) 50 mg tablet Take 1 tablet (50 mg total) by mouth daily at bedtime as needed for sleep 90 tablet 3   • acetaminophen (TYLENOL) 325 mg tablet Take 3 tablets (975 mg total) by mouth every 8 (eight) hours (Patient not taking: Reported on 8/4/2023)  0   • naloxone (NARCAN) 4 mg/0.1 mL nasal spray Administer 1 spray into a nostril. If no response after 2-3 minutes, give another dose in the other nostril using a new spray. (Patient not taking: Reported on 7/13/2023) 1 each 1     No current facility-administered medications for this visit. Allergies   Allergen Reactions   • Varenicline Rash     Bad dreams       Objective   Vitals: Blood pressure 118/64, pulse 90, height 5' 8" (1.727 m), SpO2 95 %. Physical Exam  Vitals reviewed. Constitutional:       General: He is not in acute distress. Appearance: He is well-developed. HENT:      Head: Normocephalic and atraumatic. Eyes:      Pupils: Pupils are equal, round, and reactive to light. Neck:      Thyroid: No thyromegaly. Cardiovascular:      Rate and Rhythm: Normal rate and regular rhythm. Heart sounds: Normal heart sounds. Pulmonary:      Effort: Pulmonary effort is normal. No respiratory distress. Breath sounds: Normal breath sounds. Musculoskeletal:         General: Deformity (left foot ) present. Normal range of motion. Cervical back: Normal range of motion and neck supple. Left lower leg: No edema. Comments: Right AKA    Skin:     General: Skin is warm and dry. Findings: No erythema. Neurological:      General: No focal deficit present. Mental Status: He is alert and oriented to person, place, and time. Psychiatric:         Mood and Affect: Mood normal.         Behavior: Behavior normal.         The history was obtained from the review of the chart, patient.     Lab Results:   Lab Results   Component Value Date/Time    Hemoglobin A1C 7.3 (H) 06/09/2023 02:05 PM    Hemoglobin A1C 7.1 (H) 01/27/2023 09:51 AM    Hemoglobin A1C 7.4 (A) 12/06/2022 02:16 PM    WBC 9.71 12/07/2022 04:49 PM    WBC 10.50 (H) 11/22/2022 02:07 PM    WBC 11.85 (H) 10/10/2022 05:18 PM    Hemoglobin 12.9 12/07/2022 04:49 PM    Hemoglobin 14.2 11/22/2022 02:07 PM    Hemoglobin 15.7 10/10/2022 05:18 PM    Hematocrit 41.1 12/07/2022 04:49 PM    Hematocrit 42.6 11/22/2022 02:07 PM    Hematocrit 49.2 10/10/2022 05:18 PM    MCV 89 12/07/2022 04:49 PM    MCV 87 11/22/2022 02:07 PM    MCV 87 10/10/2022 05:18 PM    Platelets 452 88/51/9375 04:49 PM    Platelets 525 76/34/0425 02:07 PM    Platelets 648 47/68/4586 05:18 PM    BUN 17 06/09/2023 02:05 PM    BUN 30 (H) 01/27/2023 09:51 AM    BUN 18 12/07/2022 04:49 PM    Potassium 4.8 06/09/2023 02:05 PM    Potassium 4.8 01/27/2023 09:51 AM    Potassium 5.0 12/07/2022 04:49 PM    Chloride 104 06/09/2023 02:05 PM    Chloride 101 01/27/2023 09:51 AM    Chloride 108 12/07/2022 04:49 PM    CO2 29 06/09/2023 02:05 PM    CO2 30 01/27/2023 09:51 AM    CO2 28 12/07/2022 04:49 PM    Creatinine 1.00 06/09/2023 02:05 PM    Creatinine 1.29 01/27/2023 09:51 AM    Creatinine 1.06 12/07/2022 04:49 PM    AST 22 06/09/2023 02:05 PM    AST 12 12/07/2022 04:49 PM    AST 22 10/10/2022 05:18 PM    ALT 25 06/09/2023 02:05 PM    ALT 18 12/07/2022 04:49 PM    ALT 21 10/10/2022 05:18 PM    Total Protein 7.6 06/09/2023 02:05 PM    Total Protein 8.5 (H) 12/07/2022 04:49 PM    Total Protein 9.2 (H) 10/10/2022 05:18 PM    Albumin 3.2 (L) 06/09/2023 02:05 PM    Albumin 3.1 (L) 12/07/2022 04:49 PM    Albumin 4.5 10/10/2022 05:18 PM           Imaging Studies: I have personally reviewed pertinent reports. Portions of the record may have been created with voice recognition software. Occasional wrong word or "sound a like" substitutions may have occurred due to the inherent limitations of voice recognition software. Read the chart carefully and recognize, using context, where substitutions have occurred.

## 2023-08-14 ENCOUNTER — TELEPHONE (OUTPATIENT)
Dept: GASTROENTEROLOGY | Facility: CLINIC | Age: 63
End: 2023-08-14

## 2023-08-14 ENCOUNTER — NURSE TRIAGE (OUTPATIENT)
Age: 63
End: 2023-08-14

## 2023-08-14 ENCOUNTER — TELEPHONE (OUTPATIENT)
Dept: SURGERY | Facility: HOSPITAL | Age: 63
End: 2023-08-14

## 2023-08-14 ENCOUNTER — ANESTHESIA (OUTPATIENT)
Dept: ANESTHESIOLOGY | Facility: HOSPITAL | Age: 63
End: 2023-08-14

## 2023-08-14 ENCOUNTER — ANESTHESIA EVENT (OUTPATIENT)
Dept: ANESTHESIOLOGY | Facility: HOSPITAL | Age: 63
End: 2023-08-14

## 2023-08-14 ENCOUNTER — HOME CARE VISIT (OUTPATIENT)
Dept: HOME HEALTH SERVICES | Facility: HOME HEALTHCARE | Age: 63
End: 2023-08-14
Payer: MEDICARE

## 2023-08-14 NOTE — TELEPHONE ENCOUNTER
Patients nephew calling in, patient wants to cancel his colonoscopy for tomorrow and only do the EGD for now. He was to have a two day prep but did not prep yesterday.

## 2023-08-14 NOTE — ANESTHESIA PREPROCEDURE EVALUATION
Procedure:  PRE-OP ONLY    Relevant Problems   CARDIO   (+) Aortic thrombus (HCC)   (+) Essential hypertension   (+) Hyperlipidemia associated with type 2 diabetes mellitus (HCC)   (+) Mixed hyperlipidemia      ENDO   (+) Type 2 diabetes mellitus with diabetic polyneuropathy (HCC)   (+) Type 2 diabetes mellitus, with long-term current use of insulin (HCC)      GI/HEPATIC   (+) Chronic hepatitis C without hepatic coma (HCC)      MUSCULOSKELETAL   (+) Chronic bilateral low back pain      NEURO/PSYCH   (+) Chronic bilateral low back pain   (+) Chronic pain syndrome   (+) Depression   (+) Diabetic neuropathy (HCC)      PULMONARY   (+) Acute respiratory failure with hypoxia (HCC)      Normal sinus rhythm  Low voltage QRS  Borderline ECG  When compared with ECG of 26-FEB-2021 16:20,  Nonspecific T wave abnormality no longer evident in Lateral leads        Physical Exam    Airway    Mallampati score: II  TM Distance: >3 FB  Neck ROM: full     Dental   No notable dental hx     Cardiovascular  Cardiovascular exam normal    Pulmonary  Pulmonary exam normal     Other Findings     LEFT VENTRICLE:  Systolic function was vigorous. Ejection fraction was estimated to be 70 %. There were no regional wall motion abnormalities.     RIGHT VENTRICLE:  The size was normal.  Systolic function was hyperdynamic.     HISTORY: PRIOR HISTORY: COPD, DM, HLD, HTN, SOB.     PROCEDURE: The procedure was performed at the bedside. This was a routine study. The transthoracic approach was used. The study included limited 2D imaging, limited spectral Doppler, and color Doppler. Echocardiographic views were limited  due to lung interference and patient on mechanical ventilator. This was a technically difficult study.     LEFT VENTRICLE: Size was normal. Systolic function was vigorous. Ejection fraction was estimated to be 70 %.  There were no regional wall motion abnormalities.     RIGHT VENTRICLE: The size was normal. Systolic function was hyperdynamic.     MITRAL VALVE: DOPPLER: The transmitral velocity was within the normal range. There was trace regurgitation.     TRICUSPID VALVE: DOPPLER: The transtricuspid velocity was within the normal range. There was trace regurgitation.     SYSTEMIC VEINS: IVC: The inferior vena cava was normal in size. Respirophasic changes were normal.      Anesthesia Plan  ASA Score- 3     Anesthesia Type- IV sedation with anesthesia with ASA Monitors. Additional Monitors:   Airway Plan:           Plan Factors-Exercise tolerance (METS): >4 METS. Chart reviewed. EKG reviewed. Imaging results reviewed. Existing labs reviewed. Patient summary reviewed. Patient is not a current smoker. Patient not instructed to abstain from smoking on day of procedure. Patient did not smoke on day of surgery. Obstructive sleep apnea risk education given perioperatively. Induction- intravenous. Postoperative Plan-     Informed Consent- Anesthetic plan and risks discussed with patient. I personally reviewed this patient with the CRNA. Discussed and agreed on the Anesthesia Plan with the CRNA. Lala Flores

## 2023-08-14 NOTE — TELEPHONE ENCOUNTER
Patients nephew Necia Ganser called in, patient wants to cancel his colonoscopy for tomorrow and only do the EGD for now. He was to have a two day prep but did not prep yesterday. Patient is still having the EGD with Dr. Caroline Sarkar on 8/15/2023, patient is aware he is still having the EGD. They expressed understanding.

## 2023-08-15 ENCOUNTER — ANESTHESIA (OUTPATIENT)
Dept: PERIOP | Facility: HOSPITAL | Age: 63
End: 2023-08-15

## 2023-08-15 ENCOUNTER — HOSPITAL ENCOUNTER (OUTPATIENT)
Dept: PERIOP | Facility: HOSPITAL | Age: 63
Setting detail: OUTPATIENT SURGERY
Discharge: HOME | End: 2023-08-15
Admitting: INTERNAL MEDICINE
Payer: MEDICARE

## 2023-08-15 ENCOUNTER — ANESTHESIA EVENT (OUTPATIENT)
Dept: PERIOP | Facility: HOSPITAL | Age: 63
End: 2023-08-15

## 2023-08-15 VITALS
SYSTOLIC BLOOD PRESSURE: 136 MMHG | HEIGHT: 68 IN | RESPIRATION RATE: 18 BRPM | TEMPERATURE: 98 F | DIASTOLIC BLOOD PRESSURE: 72 MMHG | BODY MASS INDEX: 25.76 KG/M2 | OXYGEN SATURATION: 97 % | WEIGHT: 170 LBS | HEART RATE: 91 BPM

## 2023-08-15 DIAGNOSIS — R11.2 NAUSEA AND VOMITING, UNSPECIFIED VOMITING TYPE: ICD-10-CM

## 2023-08-15 DIAGNOSIS — K21.9 GASTROESOPHAGEAL REFLUX DISEASE, UNSPECIFIED WHETHER ESOPHAGITIS PRESENT: ICD-10-CM

## 2023-08-15 PROCEDURE — 43239 EGD BIOPSY SINGLE/MULTIPLE: CPT | Performed by: INTERNAL MEDICINE

## 2023-08-15 PROCEDURE — 82948 REAGENT STRIP/BLOOD GLUCOSE: CPT

## 2023-08-15 PROCEDURE — 88305 TISSUE EXAM BY PATHOLOGIST: CPT | Performed by: PATHOLOGY

## 2023-08-15 RX ORDER — PROPOFOL 10 MG/ML
INJECTION, EMULSION INTRAVENOUS AS NEEDED
Status: DISCONTINUED | OUTPATIENT
Start: 2023-08-15 | End: 2023-08-15

## 2023-08-15 RX ORDER — SODIUM CHLORIDE, SODIUM LACTATE, POTASSIUM CHLORIDE, CALCIUM CHLORIDE 600; 310; 30; 20 MG/100ML; MG/100ML; MG/100ML; MG/100ML
INJECTION, SOLUTION INTRAVENOUS CONTINUOUS PRN
Status: DISCONTINUED | OUTPATIENT
Start: 2023-08-15 | End: 2023-08-15

## 2023-08-15 RX ORDER — LIDOCAINE HYDROCHLORIDE 20 MG/ML
INJECTION, SOLUTION EPIDURAL; INFILTRATION; INTRACAUDAL; PERINEURAL AS NEEDED
Status: DISCONTINUED | OUTPATIENT
Start: 2023-08-15 | End: 2023-08-15

## 2023-08-15 RX ADMIN — LIDOCAINE HYDROCHLORIDE 100 MG: 20 INJECTION, SOLUTION EPIDURAL; INFILTRATION; INTRACAUDAL; PERINEURAL at 08:12

## 2023-08-15 RX ADMIN — PROPOFOL 50 MG: 10 INJECTION, EMULSION INTRAVENOUS at 08:18

## 2023-08-15 RX ADMIN — PROPOFOL 150 MG: 10 INJECTION, EMULSION INTRAVENOUS at 08:12

## 2023-08-15 RX ADMIN — SODIUM CHLORIDE, SODIUM LACTATE, POTASSIUM CHLORIDE, AND CALCIUM CHLORIDE: .6; .31; .03; .02 INJECTION, SOLUTION INTRAVENOUS at 07:51

## 2023-08-15 NOTE — H&P
History and Physical - [facility]  Elpidio Nuñez 70 y.o. male MRN: [id number]                  HPI: Elpidio Nuñez is a 70y.o. year old male who presents for nausea, GERD      REVIEW OF SYSTEMS: Per the HPI, and otherwise unremarkable. Historical Information   Past Medical History:   Diagnosis Date   • Arthritis    • Diabetes mellitus ([facility])    • DVT (deep venous thrombosis) ([facility])    • GERD (gastroesophageal reflux disease)    • History of COVID-19 2020    severe disease, intubated/ICU x 1 month   • History of drug abuse ([facility])     heroin abuse, clean since 2003   • Hypercholesteremia    • Hypertension    • Ischemia of right lower extremity with suspected rhabdomyolysis 02/06/2021   • Left Hydropneumothorax 02/10/2021   • Methadone use    • Pneumonia due to COVID-19 virus 01/11/2021   • Subacute osteomyelitis of right femur ([facility]) 09/13/2021   • Vascular disorder of lower extremity      Past Surgical History:   Procedure Laterality Date   • AMPUTATION ABOVE KNEE (AKA) Right 01/14/2021    Procedure: AMPUTATION ABOVE KNEE (AKA); Surgeon: Libby Morataya MD;  Location: [facility];  Service: Vascular   • AMPUTATION ABOVE KNEE (AKA) Right 01/18/2021    Procedure: AMPUTATION ABOVE KNEE (AKA) FORMALIZATION,  R AKA wound washout, wound closure;  Surgeon: Libby Morataya MD;  [facility]: [facility];  Service: Vascular   • ARTERIOGRAM Right 01/13/2021    Procedure: ARTERIOGRAM;  Surgeon: Demi Taylor DO;  [facility]: [facility];  Service: Vascular   • BRONCHOSCOPY     • IR CHEST TUBE PLACEMENT  02/10/2021   • IR LOWER EXTREMITY ANGIOGRAM  01/14/2021   • IR LOWER EXTREMITY ANGIOGRAM  11/28/2022   • CT AMPUTATION TOE METATARSOPHALANGEAL JOINT Left 1/20/2023    Procedure: AMPUTATION TOE- hallux;   Surgeon: Terzea Edwards DPM;  Location: [facility];  Service: Podiatry   • CT Raya Ebbs 3RD+ ORD SLCTV ABDL PEL/LXTR [facility] Left 11/28/2022    Procedure: LEG AGRAM W/ INTERVENTION;  Surgeon: Naif Cordova MD;  Location: [facility];  Service: Vascular   • THROMBECTOMY W/ EMBOLECTOMY Right 2021    Procedure: EMBOLECTOMY/THROMBECTOMY LOWER EXTREMITY;  Surgeon: Cindy Cross DO;  [facility];  Service: Vascular     Social History   Social History     Substance and Sexual Activity   Alcohol Use Not Currently     Social History     Substance and Sexual Activity   Drug Use Not Currently   • Types: Heroin    Comment: clean since , now on methadone     Social History     Tobacco Use   Smoking Status Some Days   • Packs/day: 0.25   • Types: Cigarettes   • Last attempt to quit: 2022   • Years since quittin.7   Smokeless Tobacco Never     Family History   Problem Relation Age of Onset   • Diabetes Mother    • Hypertension Father    • Leukemia Father    • Acute lymphoblastic leukemia Father    • Cancer Sister        Meds/Allergies     (Not in a hospital admission)      Allergies   Allergen Reactions   • Varenicline Rash     Bad dreams       Objective     There were no vitals taken for this visit. PHYSICAL EXAMINATION:    General Appearance:   Alert, cooperative, no distress   HEENT:  Normocephalic, atraumatic, anicteric. Neck supple, symmetrical, trachea midline. Lungs:   Equal chest rise and unlabored breathing, normal effort, no coughing. Cardiovascular:   No visualized JVD. Abdomen:   No abdominal distension. Skin:   No jaundice, rashes, or lesions. Musculoskeletal:   Normal range of motion visualized. Psych:  Normal affect and normal insight. Neuro:  Alert and appropriate. ASSESSMENT/PLAN:  This is a 70y.o. year old male here for EGD, and he is stable and optimized for his procedure.

## 2023-08-15 NOTE — ANESTHESIA PREPROCEDURE EVALUATION
Procedure:  EGD    Relevant Problems   CARDIO   (+) Aortic thrombus (HCC)   (+) Essential hypertension   (+) Hyperlipidemia associated with type 2 diabetes mellitus ([address])   (+) Mixed hyperlipidemia      ENDO   (+) Type 2 diabetes mellitus with diabetic polyneuropathy (HCC)   (+) Type 2 diabetes mellitus, with long-term current use of insulin (HCC)      GI/HEPATIC   (+) Chronic hepatitis C without hepatic coma (HCC)      MUSCULOSKELETAL   (+) Chronic bilateral low back pain      NEURO/PSYCH   (+) Chronic bilateral low back pain   (+) Chronic pain syndrome   (+) Depression   (+) Diabetic neuropathy (HCC)      PULMONARY   (+) Acute respiratory failure with hypoxia (HCC)      Normal sinus rhythm  Low voltage QRS  Borderline ECG  When compared with ECG of 26-FEB-2021 16:20,  Nonspecific T wave abnormality no longer evident in Lateral leads        Physical Exam    Airway    Mallampati score: II  TM Distance: >3 FB  Neck ROM: full     Dental   No notable dental hx     Cardiovascular  Cardiovascular exam normal    Pulmonary  Pulmonary exam normal     Other Findings     LEFT VENTRICLE:  Systolic function was vigorous. Ejection fraction was estimated to be 70 %. There were no regional wall motion abnormalities.     RIGHT VENTRICLE:  The size was normal.  Systolic function was hyperdynamic.     HISTORY: PRIOR HISTORY: COPD, DM, HLD, HTN, SOB.     PROCEDURE: The procedure was performed at the bedside. This was a routine study. The transthoracic approach was used. The study included limited 2D imaging, limited spectral Doppler, and color Doppler. Echocardiographic views were limited  due to lung interference and patient on mechanical ventilator. This was a technically difficult study.     LEFT VENTRICLE: Size was normal. Systolic function was vigorous. Ejection fraction was estimated to be 70 %.  There were no regional wall motion abnormalities.     RIGHT VENTRICLE: The size was normal. Systolic function was hyperdynamic.     MITRAL VALVE: DOPPLER: The transmitral velocity was within the normal range. There was trace regurgitation.     TRICUSPID VALVE: DOPPLER: The transtricuspid velocity was within the normal range. There was trace regurgitation.     SYSTEMIC VEINS: IVC: The inferior vena cava was normal in size. Respirophasic changes were normal.      Anesthesia Plan  ASA Score- 3     Anesthesia Type- IV sedation with anesthesia with ASA Monitors. Additional Monitors:   Airway Plan:           Plan Factors-Exercise tolerance (METS): >4 METS. Chart reviewed. EKG reviewed. Imaging results reviewed. Existing labs reviewed. Patient summary reviewed. Patient is not a current smoker. Patient not instructed to abstain from smoking on day of procedure. Patient did not smoke on day of surgery. Obstructive sleep apnea risk education given perioperatively. Induction- intravenous. Postoperative Plan-     Informed Consent- Anesthetic plan and risks discussed with patient. I personally reviewed this patient with the CRNA. Discussed and agreed on the Anesthesia Plan with the CRNA. Andrew Christian

## 2023-08-15 NOTE — PROGRESS NOTES
Pt is awake and alert; he said the reader for his continuous glucose sensor is in the car so blood sugar was checked using our monitor; sugar was 130.

## 2023-08-15 NOTE — ANESTHESIA POSTPROCEDURE EVALUATION
Post-Op Assessment Note    CV Status:  Stable  Pain Score: 0    Pain management: adequate     Mental Status:  Sleepy   Hydration Status:  Euvolemic   PONV Controlled:  Controlled   Airway Patency:  Patent      Post Op Vitals Reviewed: Yes      Staff: CRNA         No notable events documented.     BP   105/63   Temp      Pulse  83   Resp   12   SpO2   96

## 2023-08-18 PROCEDURE — 88305 TISSUE EXAM BY PATHOLOGIST: CPT | Performed by: PATHOLOGY

## 2023-08-21 DIAGNOSIS — K21.9 GASTROESOPHAGEAL REFLUX DISEASE, UNSPECIFIED WHETHER ESOPHAGITIS PRESENT: Primary | ICD-10-CM

## 2023-08-21 LAB
GLUCOSE SERPL-MCNC: 115 MG/DL (ref 65–140)
GLUCOSE SERPL-MCNC: 131 MG/DL (ref 65–140)

## 2023-08-21 RX ORDER — PANTOPRAZOLE SODIUM 40 MG/1
40 TABLET, DELAYED RELEASE ORAL DAILY
Qty: 30 TABLET | Refills: 3 | Status: SHIPPED | OUTPATIENT
Start: 2023-08-21

## 2023-08-23 ENCOUNTER — OFFICE VISIT (OUTPATIENT)
Dept: PODIATRY | Facility: CLINIC | Age: 63
End: 2023-08-23
Payer: MEDICARE

## 2023-08-23 VITALS
BODY MASS INDEX: 25.85 KG/M2 | SYSTOLIC BLOOD PRESSURE: 126 MMHG | HEART RATE: 94 BPM | HEIGHT: 68 IN | DIASTOLIC BLOOD PRESSURE: 71 MMHG

## 2023-08-23 DIAGNOSIS — E11.49 OTHER DIABETIC NEUROLOGICAL COMPLICATION ASSOCIATED WITH TYPE 2 DIABETES MELLITUS (HCC): ICD-10-CM

## 2023-08-23 DIAGNOSIS — M20.42 HAMMER TOE OF LEFT FOOT: Primary | ICD-10-CM

## 2023-08-23 DIAGNOSIS — B35.1 ONYCHOMYCOSIS: ICD-10-CM

## 2023-08-23 PROCEDURE — 11720 DEBRIDE NAIL 1-5: CPT | Performed by: PODIATRIST

## 2023-08-23 NOTE — PROGRESS NOTES
Annabella Alvarez  1960  AT RISK FOOT CARE    1. Hammer toe of left foot        2. Onychomycosis        3. Other diabetic neurological complication associated with type 2 diabetes mellitus [facility])            Patient presents for at-risk foot care. Patient has no acute concerns today. Patient has significant lower extremity risk due to previous amputation. On exam patient has thickened, hypertrophic, discolored, brittle toenails with subungual debris and tenderness x4   Callus: 0  Amputation: R proximal amp, Left hallux    Today's treatment includes:  Debridement of toenails. Using nail nipper, linda, and curette, nails were sharply debrided, reduced in thickness and length. Devitalized nail tissue and fungal debris excised and removed. Patient tolerated well. Discussed proper shoe gear, daily inspections of feet, and general foot health with patient. Patient has Q7  findings and is recommended for at risk foot care every 9-10 weeks.       Has continued scab only dorsal aspect of the left second digit with Arthritic hammertoe return in 4 weeks for repeat assessment

## 2023-08-25 DIAGNOSIS — Z79.4 TYPE 2 DIABETES MELLITUS WITH HYPERGLYCEMIA, WITH LONG-TERM CURRENT USE OF INSULIN (HCC): ICD-10-CM

## 2023-08-25 DIAGNOSIS — E11.65 TYPE 2 DIABETES MELLITUS WITH HYPERGLYCEMIA, WITH LONG-TERM CURRENT USE OF INSULIN (HCC): ICD-10-CM

## 2023-08-28 RX ORDER — INSULIN GLARGINE-YFGN 100 [IU]/ML
INJECTION, SOLUTION SUBCUTANEOUS
Qty: 10 ML | Refills: 0 | OUTPATIENT
Start: 2023-08-28

## 2023-08-29 DIAGNOSIS — Z79.4 TYPE 2 DIABETES MELLITUS WITH HYPERGLYCEMIA, WITH LONG-TERM CURRENT USE OF INSULIN (HCC): ICD-10-CM

## 2023-08-29 DIAGNOSIS — E11.65 TYPE 2 DIABETES MELLITUS WITH HYPERGLYCEMIA, WITH LONG-TERM CURRENT USE OF INSULIN (HCC): ICD-10-CM

## 2023-08-30 RX ORDER — INSULIN GLARGINE-YFGN 100 [IU]/ML
INJECTION, SOLUTION SUBCUTANEOUS
Qty: 15 ML | Refills: 1 | Status: SHIPPED | OUTPATIENT
Start: 2023-08-30 | End: 2023-09-05 | Stop reason: SDUPTHER

## 2023-09-01 ENCOUNTER — TELEPHONE (OUTPATIENT)
Dept: ENDOCRINOLOGY | Facility: CLINIC | Age: 63
End: 2023-09-01

## 2023-09-01 NOTE — TELEPHONE ENCOUNTER
Fax received from [facility]. Requested info sent back via fax.  Fax confirmation received

## 2023-09-05 ENCOUNTER — TELEPHONE (OUTPATIENT)
Dept: ENDOCRINOLOGY | Facility: CLINIC | Age: 63
End: 2023-09-05

## 2023-09-05 DIAGNOSIS — L97.421 DIABETIC ULCER OF LEFT HEEL ASSOCIATED WITH DIABETES MELLITUS DUE TO UNDERLYING CONDITION, LIMITED TO BREAKDOWN OF SKIN (HCC): ICD-10-CM

## 2023-09-05 DIAGNOSIS — Z79.4 TYPE 2 DIABETES MELLITUS WITH HYPERGLYCEMIA, WITH LONG-TERM CURRENT USE OF INSULIN (HCC): ICD-10-CM

## 2023-09-05 DIAGNOSIS — E08.621 DIABETIC ULCER OF LEFT HEEL ASSOCIATED WITH DIABETES MELLITUS DUE TO UNDERLYING CONDITION, LIMITED TO BREAKDOWN OF SKIN (HCC): ICD-10-CM

## 2023-09-05 DIAGNOSIS — E11.65 TYPE 2 DIABETES MELLITUS WITH HYPERGLYCEMIA, WITH LONG-TERM CURRENT USE OF INSULIN (HCC): ICD-10-CM

## 2023-09-05 RX ORDER — INSULIN GLARGINE-YFGN 100 [IU]/ML
INJECTION, SOLUTION SUBCUTANEOUS
Qty: 15 ML | Refills: 1 | Status: SHIPPED | OUTPATIENT
Start: 2023-09-05

## 2023-09-05 NOTE — TELEPHONE ENCOUNTER
Pt is out of his semglee, he borrowed his uncles,<<<<<    Can we call the pharmacy they dont have his rx for the semglee  Let the pt know it was called in.

## 2023-09-06 ENCOUNTER — TELEPHONE (OUTPATIENT)
Dept: ENDOCRINOLOGY | Facility: CLINIC | Age: 63
End: 2023-09-06

## 2023-09-06 NOTE — TELEPHONE ENCOUNTER
Spoke with patient, patient does not have long-acting insulin.   He will need prior authorization for Semglee 30 units at dinner    I have offered patient to  the sample of Basaglar 30 units until prior authorization is done, he cannot  the sample today but he will come tomorrow to  [address]     Please work on his prior autherization ,thanks     Buzzoo

## 2023-09-06 NOTE — TELEPHONE ENCOUNTER
Pt left message on clerical line  He is 5 days without his insulin. Needs prior auth.   Can someone please get his auth and call him

## 2023-09-08 ENCOUNTER — TELEPHONE (OUTPATIENT)
Dept: ENDOCRINOLOGY | Facility: CLINIC | Age: 63
End: 2023-09-08

## 2023-09-08 NOTE — TELEPHONE ENCOUNTER
[facility] faxed us a denial for semmiglee for patient and stated it was due to lack of information. A prior auth was done through cover my meds and office note was attached. The fax also stated to we should fill out the original form that was sent. I have no record of this form. I attempted to call them 2x and as I got through the prompts it was silence. I wrote a hand written request to resend us the form so it can be filled out.

## 2023-09-11 DIAGNOSIS — K21.9 GASTROESOPHAGEAL REFLUX DISEASE, UNSPECIFIED WHETHER ESOPHAGITIS PRESENT: ICD-10-CM

## 2023-09-11 DIAGNOSIS — S78.111A ABOVE-KNEE AMPUTATION OF RIGHT LOWER EXTREMITY (HCC): ICD-10-CM

## 2023-09-11 DIAGNOSIS — S78.111A ABOVE-KNEE AMPUTATION OF RIGHT LOWER EXTREMITY (HCC): Primary | ICD-10-CM

## 2023-09-11 RX ORDER — MAGNESIUM OXIDE 400 MG/1
400 TABLET ORAL DAILY
Qty: 90 TABLET | Refills: 0 | OUTPATIENT
Start: 2023-09-11

## 2023-09-11 RX ORDER — MAGNESIUM OXIDE 400 MG/1
TABLET ORAL
Refills: 0 | OUTPATIENT
Start: 2023-09-11

## 2023-09-11 RX ORDER — CALCIUM CARBONATE/VITAMIN D3 500-10/5ML
400 LIQUID (ML) ORAL DAILY
Qty: 30 TABLET | Refills: 0 | Status: SHIPPED | OUTPATIENT
Start: 2023-09-11 | End: 2023-10-11

## 2023-09-11 NOTE — TELEPHONE ENCOUNTER
Rx Mag Oxide 400 mg was sent to [facility] by error. Vascular center provider prescribes the medication. Please follow up.

## 2023-09-11 NOTE — TELEPHONE ENCOUNTER
Reviewed. Will send 30 day supply today. However, further refills should be managed by PCP as this may require periodic lab work to monitor magnesium levels.

## 2023-09-14 RX ORDER — PANTOPRAZOLE SODIUM 40 MG/1
40 TABLET, DELAYED RELEASE ORAL DAILY
Qty: 30 TABLET | Refills: 3 | Status: SHIPPED | OUTPATIENT
Start: 2023-09-14

## 2023-09-18 ENCOUNTER — TELEPHONE (OUTPATIENT)
Age: 63
End: 2023-09-18

## 2023-09-18 NOTE — TELEPHONE ENCOUNTER
Caller: Melvin Granados    Doctor/Office: Dr. Afshan Clark    #: [phone]    Escalation: Care Calling on behalf of Kirk Green. He has a wound that opened up and would like to speak to someone in office about it. I advised the appt on the 28th needs to be rescheduled but [facility] that so I did not cancel. Please return call.  Thank you

## 2023-09-20 ENCOUNTER — OFFICE VISIT (OUTPATIENT)
Dept: PODIATRY | Facility: CLINIC | Age: 63
End: 2023-09-20
Payer: MEDICARE

## 2023-09-20 ENCOUNTER — APPOINTMENT (OUTPATIENT)
Dept: RADIOLOGY | Facility: CLINIC | Age: 63
End: 2023-09-20
Payer: MEDICARE

## 2023-09-20 VITALS
SYSTOLIC BLOOD PRESSURE: 106 MMHG | DIASTOLIC BLOOD PRESSURE: 68 MMHG | HEIGHT: 68 IN | HEART RATE: 80 BPM | WEIGHT: 170 LBS | BODY MASS INDEX: 25.76 KG/M2

## 2023-09-20 DIAGNOSIS — L03.032 CELLULITIS OF TOE OF LEFT FOOT: Primary | ICD-10-CM

## 2023-09-20 DIAGNOSIS — L97.522 SKIN ULCER OF TOE OF LEFT FOOT WITH FAT LAYER EXPOSED (HCC): ICD-10-CM

## 2023-09-20 PROCEDURE — 73630 X-RAY EXAM OF FOOT: CPT

## 2023-09-20 PROCEDURE — 11042 DBRDMT SUBQ TIS 1ST 20SQCM/<: CPT | Performed by: PODIATRIST

## 2023-09-20 PROCEDURE — 99214 OFFICE O/P EST MOD 30 MIN: CPT | Performed by: PODIATRIST

## 2023-09-20 RX ORDER — MAGNESIUM OXIDE TAB 400 MG (241.3 MG ELEMENTAL MG) 400 (241.3 MG) MG
400 TAB ORAL EVERY MORNING
COMMUNITY
Start: 2023-09-11

## 2023-09-20 RX ORDER — DOXYCYCLINE 100 MG/1
100 TABLET ORAL 2 TIMES DAILY
Qty: 20 TABLET | Refills: 0 | Status: SHIPPED | OUTPATIENT
Start: 2023-09-20 | End: 2023-09-30

## 2023-09-21 ENCOUNTER — HOSPITAL ENCOUNTER (OUTPATIENT)
Dept: RADIOLOGY | Facility: HOSPITAL | Age: 63
Discharge: HOME | End: 2023-09-21

## 2023-09-21 ENCOUNTER — TELEPHONE (OUTPATIENT)
Age: 63
End: 2023-09-21

## 2023-09-21 DIAGNOSIS — L03.032 CELLULITIS OF TOE OF LEFT FOOT: ICD-10-CM

## 2023-09-21 DIAGNOSIS — L97.522 SKIN ULCER OF TOE OF LEFT FOOT WITH FAT LAYER EXPOSED (HCC): ICD-10-CM

## 2023-09-21 NOTE — TELEPHONE ENCOUNTER
Doctor:Dr. Sharon Pacheco  Caller Name: [facility]  #:[phone]     Radiology called to speak to clinical team regarding Xray ordered by provider.

## 2023-09-21 NOTE — TELEPHONE ENCOUNTER
Spoke with Diamond in Rad Dept that Dr Perdomo Legacy will be made aware if he isnt already of the xray finding as he ordered a STAT MRI of the area. Nothing further needed.

## 2023-09-25 ENCOUNTER — OFFICE VISIT (OUTPATIENT)
Age: 63
End: 2023-09-25
Payer: MEDICARE

## 2023-09-25 VITALS
SYSTOLIC BLOOD PRESSURE: 118 MMHG | DIASTOLIC BLOOD PRESSURE: 64 MMHG | HEART RATE: 86 BPM | RESPIRATION RATE: 17 BRPM | OXYGEN SATURATION: 96 % | HEIGHT: 68 IN | TEMPERATURE: 97.3 F | BODY MASS INDEX: 25.85 KG/M2

## 2023-09-25 DIAGNOSIS — K59.03 DRUG-INDUCED CONSTIPATION: ICD-10-CM

## 2023-09-25 DIAGNOSIS — K21.9 GASTROESOPHAGEAL REFLUX DISEASE, UNSPECIFIED WHETHER ESOPHAGITIS PRESENT: Primary | ICD-10-CM

## 2023-09-25 DIAGNOSIS — R11.0 NAUSEA: ICD-10-CM

## 2023-09-25 DIAGNOSIS — Z12.11 SCREENING FOR COLON CANCER: ICD-10-CM

## 2023-09-25 DIAGNOSIS — E61.1 IRON DEFICIENCY: ICD-10-CM

## 2023-09-25 PROCEDURE — 99214 OFFICE O/P EST MOD 30 MIN: CPT | Performed by: NURSE PRACTITIONER

## 2023-09-25 RX ORDER — PROCHLORPERAZINE MALEATE 5 MG/1
5 TABLET ORAL EVERY 6 HOURS PRN
Qty: 45 TABLET | Refills: 2 | Status: SHIPPED | OUTPATIENT
Start: 2023-09-25

## 2023-09-25 RX ORDER — FAMOTIDINE 40 MG/1
40 TABLET, FILM COATED ORAL
Qty: 30 TABLET | Refills: 2 | Status: SHIPPED | OUTPATIENT
Start: 2023-09-25

## 2023-09-25 RX ORDER — PANTOPRAZOLE SODIUM 40 MG/1
TABLET, DELAYED RELEASE ORAL
Qty: 60 TABLET | Refills: 2 | Status: SHIPPED | OUTPATIENT
Start: 2023-09-25

## 2023-09-25 RX ORDER — FERROUS SULFATE TAB EC 324 MG (65 MG FE EQUIVALENT) 324 (65 FE) MG
324 TABLET DELAYED RESPONSE ORAL
Qty: 30 TABLET | Refills: 2 | Status: SHIPPED | OUTPATIENT
Start: 2023-09-25

## 2023-09-25 NOTE — PROGRESS NOTES
[facility] - Outpatient Follow-up Note  Nate Pinto 70 y.o. male MRN: [id number]  Encounter: [id number]          ASSESSMENT AND PLAN:    The patient presents today for follow-up office visit regarding his GERD, nausea, and iron deficiency. Exam: Abdomen is distended, moderately firm, but non-tender with faint BS x 4. No edema noted of the b/l lower extremities upon exam today. Skin is non-icteric. 1. Gastroesophageal reflux disease, unspecified whether esophagitis present  2. Nausea  While the patient and his family member were in the office today, I discussed with the patient that at this point in time because of the confirmed underlying Norman's esophagus and he continues with the breakthrough nausea and reflux symptoms in the morning I am going to increase his Protonix to 40 mg twice daily for now and change his use of the famotidine 40 mg to 1 pill daily as needed for breakthrough reflux symptoms. We will also discontinue the Zofran and try Compazine 5 mg, 1 tablet every 6 hours as needed for nausea or vomiting. The patient is to contact our office if he has any side effects or issues with the changes in his medication regimen. The patient was agreeable and verbalized an understanding. Encourage the patient to seriously consider smoking cessation by at least 50%, if not quitting altogether. Encouraged the patient to try to limit any trigger foods. - famotidine (PEPCID) 40 MG tablet; Take 1 tablet (40 mg total) by mouth daily at bedtime as needed for heartburn  Dispense: 30 tablet; Refill: 2  - pantoprazole (PROTONIX) 40 mg tablet; Take 1 PO BID 30 mins prior to a meal.  Dispense: 60 tablet; Refill:   - prochlorperazine (COMPAZINE) 5 mg tablet; Take 1 tablet (5 mg total) by mouth every 6 (six) hours as needed for nausea or vomiting  Dispense: 45 tablet; Refill: 2    3.  Iron deficiency  Improving    I discussed with the patient that since his iron level is slowly increasing since he started on the oral iron supplementation, for now, we will continue the iron supplementation and see how he does. We will redraw blood work at his next office visit to continue to monitor and evaluate his iron level. The patient was agreeable and verbalized an understanding.    - ferrous sulfate 324 (65 Fe) mg; Take 1 tablet (324 mg total) by mouth daily before breakfast  Dispense: 30 tablet; Refill: 2    4. Screening for colon cancer  At this point time as the patient had decided to cancel the colonoscopy at the time of the EGD because with him being an amputee he did not think that the 2-day prep was going to be something he could do and for now he would like to defer the colonoscopy and Cologuard and just work on the upper GI symptoms and at his next office visit is willing to discuss the colonoscopy or at least Cologuard at that point. 5. Drug induced constipation  I discussed with the patient that at this point time I feel it is reasonable for him to continue with the Colace daily as that does seem to be helpful, but since I would like him to be having at least a bowel movement every other day, if not daily I would like him to retry taking the MiraLAX at least every other day, if not daily and he can use a small as a quarter or half dose and see how he does. I explained the patient that I would also like him to make some consistent effort to try and increase his overall water intake as that would be helpful as well. I reviewed with the patient that overall it is important for him to find a bowel regimen that keeps him regular, especially with his chronic opioid use. The patient was agreeable and verbalized an understanding.     The patient will schedule a follow up office visit in 12 weeks, but understands to call or contact our office if there are any issues or concerns in the mean time.  ______________________________________________________________________    SUBJECTIVE: Patient is a 70 y.o. male who was previously seen in our office on 6/21/23 by IRVING Curran PA-C and presents today for follow-up office visit regarding his GERD, nausea, and iron deficiency. The patient reports that since his last office visit he does feel that there has been some improvement in his overall symptoms as he does feel the Protonix and the famotidine are helping his reflux symptoms, but he is still having some reflux symptoms and nausea first thing in the morning when he wakes up that does seem to improve once he takes his Protonix. The patient was also recently started on doxycycline for toe ulcer infection and definitely feels that after taking the doxycycline he is nauseous, but he had the nausea before the antibiotic and he does continue to try to use the as needed Zofran but does not find it helpful. The patient is status post a repeat EGD on August 15, 2023 which did not again show Norman's esophagus with the pathology confirming Norman's esophagus. The recommendation at this point is to proceed with a repeat EGD in 3 years for surveillance purposes. The patient does continue to smoke at least a pack per day. The patient does report that he continues to deal with intermittent constipation, but that it has been much more manageable as he typically is taking 2 Colace tablets a day and then if he does not have a bowel movement for more than a day or 2 he takes the MiraLAX and then within a few hours has a bowel movement and is back on schedule. He reports he did try taking the MiraLAX every day, however, then he would have diarrhea every day and with him being an amputee that is just difficult for him to be back-and-forth on the toilet all day long. However, he definitely feels that MiraLAX works he just cannot take the full dose every day.     The patient denies any dysphagia, vomiting, decreased appetite, unplanned weight loss, or abdominal pain. Water Intake: 2 bottles per day. The patient reports that they have a BM every other day and reports that it is relieving, without any bloating, consistent diarrhea, nocturnal BMs, melena or bloody stools. Last BM: This morning. Flatus: Yes. Meds: Famotidine 40 mg at bedtime, Protonix 40 mg daily, iron 324 mg daily, Zofran as needed, MiraLAX as needed  NSAID Use: Denied    Tobacco: 1 PPD. Denied vaping. ETOH: Denied  Marijuana: Denied  Illicit Drug Use: Denied    Imaging: (None):     Endoscopy History: EGD: (8/15/23): C1 M2 Norman's esophagus. Path: Norman's esophagus. Recommendation is to proceed with a repeat EGD in 3 years for surveillance. COLONOSCOPY: (None):      DUE: NOW    REVIEW OF SYSTEMS IS OTHERWISE NEGATIVE. Historical Information   Past Medical History:   Diagnosis Date   • Arthritis    • Diabetes mellitus ([address])    • DVT (deep venous thrombosis) (HCC)    • GERD (gastroesophageal reflux disease)    • History of COVID-19 2020    severe disease, intubated/ICU x 1 month   • History of drug abuse ([address])     heroin abuse, clean since 2003   • Hypercholesteremia    • Hypertension    • Ischemia of right lower extremity with suspected rhabdomyolysis 02/06/2021   • Left Hydropneumothorax 02/10/2021   • Methadone use    • Pneumonia due to COVID-19 virus 01/11/2021   • Subacute osteomyelitis of right femur ([address]) 09/13/2021   • Vascular disorder of lower extremity      Past Surgical History:   Procedure Laterality Date   • AMPUTATION ABOVE KNEE (AKA) Right 01/14/2021    Procedure: AMPUTATION ABOVE KNEE (AKA);   Surgeon: Roxana Gregory MD;  [facility];  Service: Vascular   • AMPUTATION ABOVE KNEE (AKA) Right 01/18/2021    Procedure: AMPUTATION ABOVE KNEE (AKA) FORMALIZATION,  R AKA wound washout, wound closure;  Surgeon: Roxana Gregory MD;  [facility];  Service: Vascular   • ARTERIOGRAM Right 01/13/2021    Procedure: ARTERIOGRAM; Surgeon: Rudi Parekh DO;  [facility];  Service: Vascular   • BRONCHOSCOPY     • IR CHEST TUBE PLACEMENT  02/10/2021   • IR LOWER EXTREMITY ANGIOGRAM  2021   • IR LOWER EXTREMITY ANGIOGRAM  2022   • VA AMPUTATION TOE METATARSOPHALANGEAL JOINT Left 2023    Procedure: AMPUTATION TOE- hallux;   Surgeon: Adali Bennett DPM;  Location: [facility];  Service: Podiatry   • [place] 3RD+ ORD SLCTV ABDL PEL/LXTR [facility] Left 2022    Procedure: LEG AGRAM W/ INTERVENTION;  Surgeon: Darline Escalona MD;  Location: [facility];  Service: Vascular   • THROMBECTOMY W/ EMBOLECTOMY Right 2021    Procedure: EMBOLECTOMY/THROMBECTOMY LOWER EXTREMITY;  Surgeon: Rudi Parekh DO;  [facility];  Service: Vascular     Social History   Social History     Substance and Sexual Activity   Alcohol Use Not Currently     Social History     Substance and Sexual Activity   Drug Use Not Currently   • Types: Heroin    Comment: clean since , now on methadone     Social History     Tobacco Use   Smoking Status Some Days   • Packs/day: 0.25   • Types: Cigarettes   • Last attempt to quit: 2022   • Years since quittin.8   Smokeless Tobacco Never     Family History   Problem Relation Age of Onset   • Diabetes Mother    • Hypertension Father    • Leukemia Father    • Acute lymphoblastic leukemia Father    • Cancer Sister        Meds/Allergies       Current Outpatient Medications:   •  albuterol (PROVENTIL HFA,VENTOLIN HFA) 90 mcg/act inhaler  •  apixaban (Eliquis) 5 mg  •  aspirin 81 mg chewable tablet  •  atorvastatin (LIPITOR) 20 mg tablet  •  Blood Glucose Monitoring Suppl (OneTouch Verio) w/Device KIT  •  cholecalciferol (VITAMIN D3) 1,000 units tablet  •  Continuous Blood Gluc  (FreeStyle Ke 2 [place]) JOSEPH  •  Continuous Blood Gluc Sensor (FreeStyle Ke 2 Sensor) MISC  •  Diclofenac Sodium (VOLTAREN) 1 %  •  doxycycline (ADOXA) 100 MG tablet  • DULoxetine (CYMBALTA) 60 mg delayed release capsule  •  Empagliflozin (Jardiance) 25 MG TABS  •  famotidine (PEPCID) 40 MG tablet  •  ferrous sulfate 324 (65 Fe) mg  •  fluticasone (FLONASE) 50 mcg/act nasal spray  •  glucose blood test strip  •  insulin lispro (HumaLOG KwikPen) 100 units/mL injection pen  •  Insulin Pen Needle 31G X 5 MM MISC  •  Insulin Pen Needle 31G X 5 MM MISC  •  Insulin Syringe-Needle U-100 (BD Veo Insulin Syringe U/F) 31G X 15/64" 0.5 ML MISC  •  levocetirizine (XYZAL) 5 MG tablet  •  lidocaine (LMX) 4 % cream  •  lisinopril (ZESTRIL) 10 mg tablet  •  magnesium oxide (MAG-OX) 400 mg  •  methadone (DOLOPHINE) 10 mg tablet  •  multivitamin-minerals (CENTRUM ADULTS) tablet  •  naloxone (NARCAN) 4 mg/0.1 mL nasal spray  •  ondansetron (ZOFRAN) 4 mg tablet  •  polyethylene glycol (GLYCOLAX) 17 GM/SCOOP powder  •  silver sulfadiazine (SILVADENE,SSD) 1 % cream  •  sitaGLIPtin-metFORMIN (Janumet)  MG per tablet  •  traZODone (DESYREL) 50 mg tablet  •  acetaminophen (TYLENOL) 325 mg tablet  •  Magnesium Oxide 400 MG CAPS  •  MAGnesium-Oxide 400 (240 Mg) MG TABS  •  nicotine (NICODERM CQ) 21 mg/24 hr TD 24 hr patch  •  pantoprazole (PROTONIX) 40 mg tablet  •  Semglee, yfgn, 100 UNIT/ML SOLN    Allergies   Allergen Reactions   • Varenicline Rash     Bad dreams           Objective     Height 5' 8" (1.727 m). Body mass index is 25.85 kg/m². PHYSICAL EXAM:      General Appearance:   Alert, cooperative, no distress   HEENT:   Normocephalic, atraumatic, anicteric. Neck:  Supple, symmetrical, trachea midline   Lungs:   Clear to auscultation bilaterally; no rales, rhonchi or wheezing; respirations unlabored    Heart[de-identified]   Regular rate and rhythm; no murmur, rub, or gallop.    Abdomen:   Soft, non-tender, non-distended; normal bowel sounds; no masses, no organomegaly    Genitalia:   Deferred    Rectal:   Deferred    Extremities:  No cyanosis, clubbing or edema    Pulses:  2+ and symmetric    Skin: No jaundice, rashes, or lesions    Lymph nodes:  No palpable cervical lymphadenopathy        Lab Results:   No visits with results within 1 Day(s) from this visit. Latest known visit with results is:   Hospital Outpatient Visit on 08/15/2023   Component Date Value   • Case Report 08/15/2023                      Value:Surgical Pathology Report                         Case: [id number]                                   Authorizing Provider:  Zion Schwartz MD    Collected:           08/15/2023 0813              Ordering Location:     [facility] Received:            08/15/2023 [address] [facility]                                     Operating Room                                                               Pathologist:           Caroline Stewart MD                                                    Specimens:   A) - Duodenum, cold fcp bx r/o Celiacs                                                              B) - Stomach, cold fcp bx r/o h. pylori                                                             C) - Esophagus, cold fcp bx at 36cm r/o Norman's                                                   D) - Esophagus, cold fcp bx at 35cm r/o Norman's                                         • Final Diagnosis 08/15/2023                      Value: This result contains rich text formatting which cannot be displayed here. • Additional Information 08/15/2023                      Value: This result contains rich text formatting which cannot be displayed here. • Gross Description 08/15/2023                      Value: This result contains rich text formatting which cannot be displayed here. • POC Glucose 08/15/2023 131    • POC Glucose 08/15/2023 115          Radiology Results:   XR follow up    Result Date: 9/21/2023  Narrative: LEFT FEMUR INDICATION:   L03.032: Cellulitis of left toe L97.522: Non-pressure chronic ulcer of other part of left foot with fat layer exposed.  COMPARISON:  None VIEWS:  XR FOLLOW UP (NO CHARGE) FINDINGS: There is no acute fracture or dislocation. No significant degenerative changes. No lytic or blastic osseous lesion. There is a vascular stent medially. Impression: No acute osseous abnormality. Vascular stent noted. Workstation performed: [id number]     X-ray foot left 3+ views    Result Date: 9/21/2023  Narrative: LEFT FOOT INDICATION:   H84.761: Non-pressure chronic ulcer of other part of left foot with fat layer exposed. Left second toe cellulitis, evaluate for osteomyelitis. COMPARISON: Left toe radiograph 10/10/2022. VIEWS:  XR FOOT 3+ VW LEFT Images: 3 FINDINGS: Patient has undergone interval first phalanx amputation at the level of the first metatarsal head. There is no acute fracture or dislocation. Osteopenia. Small posterior and plantar calcaneal spurs. No lytic or blastic osseous lesion. There are atherosclerotic calcifications. There is soft tissue swelling around the second toe with suspected underlying ulceration. Subtle osseous erosion suspected along the medial distal aspect of the second proximal phalanx concerning for osteomyelitis. Impression: Soft tissue swelling around the second toe with suspected underlying ulceration and subtle osseous erosion medial distal aspect of the second proximal phalanx concerning for osteomyelitis. This can be further evaluated with MRI as clinically warranted. The study was marked in EPIC for significant notification. Resident: Meche Hall, the attending radiologist, have reviewed the images and agree with the final report above.  Workstation performed: [id number]

## 2023-09-25 NOTE — PATIENT INSTRUCTIONS
Increasing Protonix 40 mg to 1 pill, 2 x daily. Continue to use famotidine as needed daily for break through reflux. Stop Zofran and start Compazine every 6 hours as needed for nausea. Try 1/4-1/2 dose of miralax at least every other day, if not daily. Continue to work on drinking more water daily. Work on smoking cessation, but by at least 50% or more. Schedule a follow up OV in 12 weeks.

## 2023-09-26 PROBLEM — K21.9 GASTROESOPHAGEAL REFLUX DISEASE: Status: ACTIVE | Noted: 2023-09-26

## 2023-09-26 PROBLEM — Z12.11 SCREENING FOR COLON CANCER: Status: ACTIVE | Noted: 2023-09-26

## 2023-09-26 PROBLEM — R11.0 NAUSEA: Status: ACTIVE | Noted: 2020-06-17

## 2023-09-26 PROBLEM — E61.1 IRON DEFICIENCY: Status: ACTIVE | Noted: 2023-09-26

## 2023-09-27 ENCOUNTER — PREP FOR PROCEDURE (OUTPATIENT)
Dept: PODIATRY | Facility: CLINIC | Age: 63
End: 2023-09-27

## 2023-09-27 ENCOUNTER — OFFICE VISIT (OUTPATIENT)
Dept: PODIATRY | Facility: CLINIC | Age: 63
End: 2023-09-27
Payer: MEDICARE

## 2023-09-27 VITALS — BODY MASS INDEX: 25.85 KG/M2 | HEIGHT: 68 IN

## 2023-09-27 DIAGNOSIS — M86.9 OSTEOMYELITIS OF TOE OF LEFT FOOT (HCC): ICD-10-CM

## 2023-09-27 DIAGNOSIS — Z89.412 HISTORY OF AMPUTATION OF LEFT GREAT TOE (HCC): Primary | ICD-10-CM

## 2023-09-27 PROCEDURE — 99214 OFFICE O/P EST MOD 30 MIN: CPT | Performed by: PODIATRIST

## 2023-09-27 RX ORDER — CHLORHEXIDINE GLUCONATE ORAL RINSE 1.2 MG/ML
15 SOLUTION DENTAL ONCE
OUTPATIENT
Start: 2023-09-27 | End: 2023-09-27

## 2023-09-27 RX ORDER — CEFAZOLIN SODIUM 1 G/50ML
1000 SOLUTION INTRAVENOUS ONCE
OUTPATIENT
Start: 2023-10-09 | End: 2023-09-27

## 2023-09-27 RX ORDER — CHLORHEXIDINE GLUCONATE 4 G/100ML
SOLUTION TOPICAL DAILY PRN
OUTPATIENT
Start: 2023-09-27

## 2023-09-27 NOTE — PROGRESS NOTES
Assessment/Plan:    No problem-specific Assessment & Plan notes found for this encounter. Diagnoses and all orders for this visit:    History of amputation of left great toe [facility])  -     Case request operating room: AMPUTATION TOE, 2nd; Standing  -     Basic metabolic panel; Future  -     CBC and differential; Future    Osteomyelitis of toe of left foot ([address])  -     Case request operating room: AMPUTATION TOE, 2nd; Standing  -     Basic metabolic panel; Future  -     CBC and differential; Future    Other orders  -     Nursing Communication Warmimg Interventions Implemented; Standing  -     Nursing Communication CHG bath, have staff wash entire body (neck down) per pre-op bathing protocol. Routine, evening prior to, and day of surgery.; Standing  -     Nursing Communication Swab both nares with Povidone-Iodine solution, EXCLUDE if patient has shellfish/Iodine allergy, and replace with nasal alcohol swabstick. Routine, day of surgery, on call to OR; Standing  -     chlorhexidine (PERIDEX) 0.12 % oral rinse 15 mL  -     Void on call to OR; Standing  -     Insert peripheral IV;  Standing  -     Diet NPO; Sips with meds; Standing  -     ceFAZolin (ANCEF) 1,000 mg in dextrose 5 % 100 mL IVPB  -     chlorhexidine (HIBICLENS) 4 % topical liquid      -X-ray report was reviewed and I concur with radiology's report rather than my initial own, there is a erosion that I did not identify on x-rays on the lateral aspect of the second digit proximal phalanx head  -This combined with palpable bone and swelling of the toes compatible with osteomyelitis  - Previous arterial studies were again reviewed and do show healing potential of the digit  - This is clinical osteomyelitis and we will pursue left second digit toe amputation we did discuss signs of infection we will attempt to perform this on an outpatient basis if he does experience any nausea vomiting fever chills shortness of breath chest pain or ascending erythema drainage or any of the other signs of infection he is to immediately report to the ER for further assessment and management of this   He is to continue to attempt nicotine cessation and strict I seem to control-      Patient was counseled and educated on the condition and the diagnosis. The diagnosis, treatment options and prognosis were discussed with the patient. The patient failed conservative care at this time and wished to proceed with surgical treatment. Explained surgical details and post-op course. Discussed all risks and complications related to the patient's condition and surgery. The benefits of surgery were also discussed. The patient understood that the surgery would not guarantee desirable outcome. All questions and concerns were addressed and the consent was signed. Subjective:      Patient ID: Paola Ying is a 70 y.o. male. Patient presents for evaluation management of his left second toe, states that he is having continued drainage, and they were unable to get to the MRI due to the length of the overlapping stent. Unable to take antibiotics due to GI issues      The following portions of the patient's history were reviewed and updated as appropriate: allergies, current medications, past family history, past medical history, past social history, past surgical history and problem list.    Review of Systems   Constitutional: Negative for chills and fever. HENT: Negative for ear pain and sore throat. Eyes: Negative for pain and visual disturbance. Respiratory: Negative for cough and shortness of breath. Cardiovascular: Negative for chest pain and palpitations. Gastrointestinal: Negative for abdominal pain and vomiting. Genitourinary: Negative for dysuria and hematuria. Musculoskeletal: Negative for arthralgias and back pain. Skin: Negative for color change and rash. Neurological: Negative for seizures and syncope. All other systems reviewed and are negative. Objective:      Ht 5' 8" (1.727 m)   BMI 25.85 kg/m²          Physical Exam  Musculoskeletal:      Comments: Ulceration size has decreased by about half, bone is exposed and palpable cellulitis and swelling has substantially decreased

## 2023-09-28 RX ORDER — INSULIN GLARGINE 100 [IU]/ML
31 INJECTION, SOLUTION SUBCUTANEOUS EVERY EVENING
COMMUNITY
End: 2023-10-05 | Stop reason: ALTCHOICE

## 2023-09-28 NOTE — PRE-PROCEDURE INSTRUCTIONS
Pre-Surgery Instructions:   Medication Instructions   • albuterol (PROVENTIL HFA,VENTOLIN HFA) 90 mcg/act inhaler Uses PRN- OK to take day of surgery   • apixaban (Eliquis) 5 mg Instructions provided by MD   • aspirin 81 mg chewable tablet Instructions provided by MD   • atorvastatin (LIPITOR) 20 mg tablet Take night before surgery   • cholecalciferol (VITAMIN D3) 1,000 units tablet Hold day of surgery. • doxycycline (ADOXA) 100 MG tablet Hold day of surgery. • DULoxetine (CYMBALTA) 60 mg delayed release capsule Take night before surgery   • Empagliflozin (Jardiance) 25 MG TABS Stop taking 4 days prior to surgery. • famotidine (PEPCID) 40 MG tablet Take day of surgery. • ferrous sulfate 324 (65 Fe) mg Hold day of surgery. • fluticasone (FLONASE) 50 mcg/act nasal spray Uses PRN- OK to take day of surgery   • Insulin Glargine Solostar (Basaglar KwikPen) 100 UNIT/ML SOPN Take night before surgery   • insulin lispro (HumaLOG KwikPen) 100 units/mL injection pen Hold day of surgery. • levocetirizine (XYZAL) 5 MG tablet Take night before surgery   • lisinopril (ZESTRIL) 10 mg tablet Hold day of surgery. • magnesium oxide (MAG-OX) 400 mg Hold day of surgery. • methadone (DOLOPHINE) 10 mg tablet Take night before surgery   • multivitamin-minerals (CENTRUM ADULTS) tablet Hold day of surgery. • pantoprazole (PROTONIX) 40 mg tablet Take day of surgery. • sitaGLIPtin-metFORMIN (Janumet)  MG per tablet Hold day of surgery. • traZODone (DESYREL) 50 mg tablet Take night before surgery   Reviewed medications and medical history with his nephew, Deric Gibson. He is patient's primary care giver. Is aware of Pre-op appointment with PCP 10/4. Instructed to obtain bloodwork prior to that appointment. Instructions on Eliquis and aspirin hold should be obtained at that appointment. Medication instructions for day surgery reviewed. Please use only a sip of water to take your instructed medications.  Avoid all over the counter vitamins, supplements and NSAIDS for one week prior to surgery per anesthesia guidelines. Tylenol is ok to take as needed. You will receive a call one business day prior to surgery with an arrival time and hospital directions. If your surgery is scheduled on a Monday, the hospital will be calling you on the Friday prior to your surgery. If you have not heard from anyone by 8pm, please call the hospital supervisor through the hospital  at [phone]. Ila Halltz [phone]). Do not eat or drink anything after midnight the night before your surgery, including candy, mints, lifesavers, or chewing gum. Do not drink alcohol 24hrs before your surgery. Try not to smoke at least 24hrs before your surgery. Follow the pre surgery showering instructions as listed in the [facility] Surgical Experience Booklet” or otherwise provided by your surgeon's office. Do not shave the surgical area 24 hours before surgery. Do not apply any lotions, creams, including makeup, cologne, deodorant, or perfumes after showering on the day of your surgery. No contact lenses, eye make-up, or artificial eyelashes. Remove nail polish, including gel polish, and any artificial, gel, or acrylic nails if possible. Remove all jewelry including rings and body piercing jewelry. Wear causal clothing that is easy to take on and off. Consider your type of surgery. Keep any valuables, jewelry, piercings at home. Please bring any specially ordered equipment (sling, braces) if indicated. Arrange for a responsible person to drive you to and from the hospital on the day of your surgery. Visitor Guidelines discussed. Call the surgeon's office with any new illnesses, exposures, or additional questions prior to surgery. Please reference your [facility] Surgical Experience Booklet” for additional information to prepare for your upcoming surgery.

## 2023-10-03 NOTE — PROGRESS NOTES
Assessment/Plan:    No problem-specific Assessment & Plan notes found for this encounter. Diagnoses and all orders for this visit:    Pre-op examination  -     POCT hemoglobin A1c    Type 2 diabetes mellitus with hyperglycemia, with long-term current use of insulin ([facility])  -     POCT hemoglobin A1c        At this time patient will be clear from family medicine standpoint given active infection state outweight risk of delayed wound healing from uncontrolled diabetes. Subjective:      Patient ID: Faith Navarro is a 70 y.o. male. HPI  Patient will be having left Toe amputation  On 10/9/2023 secondary to osteomyelitis in the setting of uncontrolled diabetes. Patient following up with endocrinology as he is currently on short, long and sliding scale insulin. His hemoglobin A1c today is 7.9 in June it was 7.3. Unfortunately patient missed couple of days of his insulin secondary to prior authorizations. Patient has been seen by podiatry on September 27 /2023. He has active infection, osteomyelitis. Unfortunately he cannot be on antibiotics secondary to GI issues and he is unable to do the MRI due to the length of the overlapping stent. At this time we will clear him for surgery considering given active infection state outweighs risk of the delayed wound healing from uncontrolled diabetes. Patient also following up for vascular  surgery secondary to PAD, status post left SFA angioplasty and stent. Was seen by vascular surgery on 1/4/2023 he is currently on Eliquis. Per vascular surgery note it is okay to hold Eliquis for 24 hours prior to procedure to avoid chance of thrombosis. The following portions of the patient's history were reviewed and updated as appropriate: allergies, current medications, past family history, past medical history, past social history, past surgical history and problem list.    Review of Systems   Constitutional: Negative for chills and fever.    HENT: Negative for ear pain and sore throat. Eyes: Negative for pain and visual disturbance. Respiratory: Negative for cough and shortness of breath. Cardiovascular: Negative for chest pain and palpitations. Gastrointestinal: Negative for abdominal pain, blood in stool and vomiting. Genitourinary: Negative for dysuria and hematuria. Skin: Negative for color change and rash. Neurological: Negative for seizures and syncope. All other systems reviewed and are negative. Objective:      /64   Pulse 85   Temp (!) 96.9 °F (36.1 °C) (Tympanic)   Ht 5' 8" (1.727 m)   Wt 79.4 kg (175 lb)   SpO2 95%   BMI 26.61 kg/m²          Physical Exam  Constitutional:       General: He is not in acute distress. Appearance: He is not toxic-appearing. HENT:      Head: Normocephalic and atraumatic. Mouth/Throat:      Mouth: Mucous membranes are moist.   Eyes:      Extraocular Movements: Extraocular movements intact. Conjunctiva/sclera: Conjunctivae normal.   Cardiovascular:      Rate and Rhythm: Normal rate and regular rhythm. Pulses: Normal pulses. Heart sounds: Normal heart sounds. Pulmonary:      Effort: Pulmonary effort is normal.      Breath sounds: Normal breath sounds. Skin:     Capillary Refill: Capillary refill takes less than 2 seconds. Neurological:      General: No focal deficit present. Mental Status: He is alert and oriented to person, place, and time. Psychiatric:         Mood and Affect: Mood normal.         Thought Content:  Thought content normal.         Judgment: Judgment normal.

## 2023-10-04 ENCOUNTER — CONSULT (OUTPATIENT)
Dept: FAMILY MEDICINE CLINIC | Facility: CLINIC | Age: 63
End: 2023-10-04

## 2023-10-04 ENCOUNTER — TELEPHONE (OUTPATIENT)
Dept: OTHER | Facility: HOSPITAL | Age: 63
End: 2023-10-04

## 2023-10-04 VITALS
OXYGEN SATURATION: 95 % | HEIGHT: 68 IN | TEMPERATURE: 96.9 F | HEART RATE: 85 BPM | DIASTOLIC BLOOD PRESSURE: 64 MMHG | SYSTOLIC BLOOD PRESSURE: 128 MMHG | BODY MASS INDEX: 26.52 KG/M2 | WEIGHT: 175 LBS

## 2023-10-04 DIAGNOSIS — Z79.4 TYPE 2 DIABETES MELLITUS WITH HYPERGLYCEMIA, WITH LONG-TERM CURRENT USE OF INSULIN (HCC): ICD-10-CM

## 2023-10-04 DIAGNOSIS — Z01.818 PRE-OP EXAMINATION: Primary | ICD-10-CM

## 2023-10-04 DIAGNOSIS — E11.42 TYPE 2 DIABETES MELLITUS WITH DIABETIC POLYNEUROPATHY, WITH LONG-TERM CURRENT USE OF INSULIN (HCC): Primary | ICD-10-CM

## 2023-10-04 DIAGNOSIS — E11.65 TYPE 2 DIABETES MELLITUS WITH HYPERGLYCEMIA, WITH LONG-TERM CURRENT USE OF INSULIN (HCC): ICD-10-CM

## 2023-10-04 DIAGNOSIS — Z79.4 TYPE 2 DIABETES MELLITUS WITH DIABETIC POLYNEUROPATHY, WITH LONG-TERM CURRENT USE OF INSULIN (HCC): Primary | ICD-10-CM

## 2023-10-04 LAB — SL AMB POCT HEMOGLOBIN AIC: 7.9 (ref ?–6.5)

## 2023-10-05 ENCOUNTER — PREP FOR PROCEDURE (OUTPATIENT)
Dept: OBGYN CLINIC | Facility: CLINIC | Age: 63
End: 2023-10-05

## 2023-10-05 RX ORDER — INSULIN DEGLUDEC INJECTION 100 U/ML
INJECTION, SOLUTION SUBCUTANEOUS
Qty: 15 ML | Refills: 1 | Status: SHIPPED | OUTPATIENT
Start: 2023-10-05

## 2023-10-07 ENCOUNTER — LAB (OUTPATIENT)
Dept: LAB | Facility: CLINIC | Age: 63
End: 2023-10-07
Payer: MEDICARE

## 2023-10-07 DIAGNOSIS — E11.65 TYPE 2 DIABETES MELLITUS WITH HYPERGLYCEMIA, WITH LONG-TERM CURRENT USE OF INSULIN (HCC): Chronic | ICD-10-CM

## 2023-10-07 DIAGNOSIS — Z79.4 TYPE 2 DIABETES MELLITUS WITH HYPERGLYCEMIA, WITH LONG-TERM CURRENT USE OF INSULIN (HCC): Chronic | ICD-10-CM

## 2023-10-07 DIAGNOSIS — I10 ESSENTIAL HYPERTENSION: Chronic | ICD-10-CM

## 2023-10-07 LAB
ALBUMIN SERPL BCP-MCNC: 4 G/DL (ref 3.5–5)
ALP SERPL-CCNC: 103 U/L (ref 34–104)
ALT SERPL W P-5'-P-CCNC: 15 U/L (ref 7–52)
ANION GAP SERPL CALCULATED.3IONS-SCNC: 4 MMOL/L
AST SERPL W P-5'-P-CCNC: 16 U/L (ref 13–39)
BILIRUB SERPL-MCNC: 0.49 MG/DL (ref 0.2–1)
BUN SERPL-MCNC: 21 MG/DL (ref 5–25)
CALCIUM SERPL-MCNC: 9.9 MG/DL (ref 8.4–10.2)
CHLORIDE SERPL-SCNC: 101 MMOL/L (ref 96–108)
CO2 SERPL-SCNC: 32 MMOL/L (ref 21–32)
CREAT SERPL-MCNC: 0.99 MG/DL (ref 0.6–1.3)
CREAT UR-MCNC: 75.8 MG/DL
EST. AVERAGE GLUCOSE BLD GHB EST-MCNC: 174 MG/DL
GFR SERPL CREATININE-BSD FRML MDRD: 79 ML/MIN/1.73SQ M
GLUCOSE P FAST SERPL-MCNC: 202 MG/DL (ref 65–99)
HBA1C MFR BLD: 7.7 %
MICROALBUMIN UR-MCNC: 146.6 MG/L
MICROALBUMIN/CREAT 24H UR: 193 MG/G CREATININE (ref 0–30)
POTASSIUM SERPL-SCNC: 4.8 MMOL/L (ref 3.5–5.3)
PROT SERPL-MCNC: 8.3 G/DL (ref 6.4–8.4)
SODIUM SERPL-SCNC: 137 MMOL/L (ref 135–147)

## 2023-10-07 PROCEDURE — 80053 COMPREHEN METABOLIC PANEL: CPT

## 2023-10-07 PROCEDURE — 83036 HEMOGLOBIN GLYCOSYLATED A1C: CPT

## 2023-10-07 PROCEDURE — 36415 COLL VENOUS BLD VENIPUNCTURE: CPT

## 2023-10-09 ENCOUNTER — ANESTHESIA (OUTPATIENT)
Dept: PERIOP | Facility: HOSPITAL | Age: 63
End: 2023-10-09
Payer: MEDICARE

## 2023-10-09 ENCOUNTER — HOSPITAL ENCOUNTER (OUTPATIENT)
Facility: HOSPITAL | Age: 63
Setting detail: OUTPATIENT SURGERY
Discharge: HOME | End: 2023-10-09
Attending: PODIATRIST | Admitting: PODIATRIST
Payer: MEDICARE

## 2023-10-09 ENCOUNTER — APPOINTMENT (OUTPATIENT)
Dept: RADIOLOGY | Facility: HOSPITAL | Age: 63
End: 2023-10-09
Payer: MEDICARE

## 2023-10-09 ENCOUNTER — ANESTHESIA EVENT (OUTPATIENT)
Dept: PERIOP | Facility: HOSPITAL | Age: 63
End: 2023-10-09
Payer: MEDICARE

## 2023-10-09 VITALS
HEART RATE: 90 BPM | DIASTOLIC BLOOD PRESSURE: 70 MMHG | WEIGHT: 178 LBS | BODY MASS INDEX: 26.98 KG/M2 | SYSTOLIC BLOOD PRESSURE: 146 MMHG | RESPIRATION RATE: 18 BRPM | TEMPERATURE: 97 F | HEIGHT: 68 IN | OXYGEN SATURATION: 96 %

## 2023-10-09 DIAGNOSIS — E11.42 TYPE 2 DIABETES MELLITUS WITH DIABETIC POLYNEUROPATHY, WITH LONG-TERM CURRENT USE OF INSULIN (HCC): ICD-10-CM

## 2023-10-09 DIAGNOSIS — Z79.4 TYPE 2 DIABETES MELLITUS WITH DIABETIC POLYNEUROPATHY, WITH LONG-TERM CURRENT USE OF INSULIN (HCC): ICD-10-CM

## 2023-10-09 DIAGNOSIS — Z89.412 HISTORY OF AMPUTATION OF LEFT GREAT TOE (HCC): ICD-10-CM

## 2023-10-09 DIAGNOSIS — M86.9 OSTEOMYELITIS OF TOE OF LEFT FOOT (HCC): ICD-10-CM

## 2023-10-09 LAB — GLUCOSE SERPL-MCNC: 211 MG/DL (ref 65–140)

## 2023-10-09 PROCEDURE — 82948 REAGENT STRIP/BLOOD GLUCOSE: CPT

## 2023-10-09 PROCEDURE — 28820 AMPUTATION OF TOE: CPT | Performed by: PODIATRIST

## 2023-10-09 PROCEDURE — 88305 TISSUE EXAM BY PATHOLOGIST: CPT | Performed by: STUDENT IN AN ORGANIZED HEALTH CARE EDUCATION/TRAINING PROGRAM

## 2023-10-09 PROCEDURE — 73630 X-RAY EXAM OF FOOT: CPT

## 2023-10-09 PROCEDURE — 99024 POSTOP FOLLOW-UP VISIT: CPT | Performed by: PODIATRIST

## 2023-10-09 PROCEDURE — 88311 DECALCIFY TISSUE: CPT | Performed by: STUDENT IN AN ORGANIZED HEALTH CARE EDUCATION/TRAINING PROGRAM

## 2023-10-09 RX ORDER — LIDOCAINE HYDROCHLORIDE 20 MG/ML
INJECTION, SOLUTION EPIDURAL; INFILTRATION; INTRACAUDAL; PERINEURAL AS NEEDED
Status: DISCONTINUED | OUTPATIENT
Start: 2023-10-09 | End: 2023-10-09 | Stop reason: HOSPADM

## 2023-10-09 RX ORDER — PROPOFOL 10 MG/ML
INJECTION, EMULSION INTRAVENOUS CONTINUOUS PRN
Status: DISCONTINUED | OUTPATIENT
Start: 2023-10-09 | End: 2023-10-09

## 2023-10-09 RX ORDER — ONDANSETRON 2 MG/ML
4 INJECTION INTRAMUSCULAR; INTRAVENOUS ONCE AS NEEDED
Status: DISCONTINUED | OUTPATIENT
Start: 2023-10-09 | End: 2023-10-09 | Stop reason: HOSPADM

## 2023-10-09 RX ORDER — SODIUM CHLORIDE, SODIUM LACTATE, POTASSIUM CHLORIDE, CALCIUM CHLORIDE 600; 310; 30; 20 MG/100ML; MG/100ML; MG/100ML; MG/100ML
125 INJECTION, SOLUTION INTRAVENOUS CONTINUOUS
Status: DISCONTINUED | OUTPATIENT
Start: 2023-10-09 | End: 2023-10-09 | Stop reason: HOSPADM

## 2023-10-09 RX ORDER — CEFAZOLIN SODIUM 1 G/50ML
1000 SOLUTION INTRAVENOUS ONCE
Status: COMPLETED | OUTPATIENT
Start: 2023-10-09 | End: 2023-10-09

## 2023-10-09 RX ORDER — MAGNESIUM HYDROXIDE 1200 MG/15ML
LIQUID ORAL AS NEEDED
Status: DISCONTINUED | OUTPATIENT
Start: 2023-10-09 | End: 2023-10-09 | Stop reason: HOSPADM

## 2023-10-09 RX ORDER — CHLORHEXIDINE GLUCONATE 4 G/100ML
SOLUTION TOPICAL DAILY PRN
Status: DISCONTINUED | OUTPATIENT
Start: 2023-10-09 | End: 2023-10-09 | Stop reason: HOSPADM

## 2023-10-09 RX ORDER — ALBUTEROL SULFATE 90 UG/1
AEROSOL, METERED RESPIRATORY (INHALATION) AS NEEDED
Status: DISCONTINUED | OUTPATIENT
Start: 2023-10-09 | End: 2023-10-09

## 2023-10-09 RX ORDER — CHLORHEXIDINE GLUCONATE ORAL RINSE 1.2 MG/ML
15 SOLUTION DENTAL ONCE
Status: COMPLETED | OUTPATIENT
Start: 2023-10-09 | End: 2023-10-09

## 2023-10-09 RX ORDER — PROPOFOL 10 MG/ML
INJECTION, EMULSION INTRAVENOUS AS NEEDED
Status: DISCONTINUED | OUTPATIENT
Start: 2023-10-09 | End: 2023-10-09

## 2023-10-09 RX ADMIN — SODIUM CHLORIDE, SODIUM LACTATE, POTASSIUM CHLORIDE, AND CALCIUM CHLORIDE 125 ML/HR: .6; .31; .03; .02 INJECTION, SOLUTION INTRAVENOUS at 14:16

## 2023-10-09 RX ADMIN — PROPOFOL 30 MG: 10 INJECTION, EMULSION INTRAVENOUS at 14:18

## 2023-10-09 RX ADMIN — CEFAZOLIN SODIUM 1000 MG: 1 SOLUTION INTRAVENOUS at 14:18

## 2023-10-09 RX ADMIN — CHLORHEXIDINE GLUCONATE 15 ML: 1.2 RINSE ORAL at 14:06

## 2023-10-09 RX ADMIN — ALBUTEROL SULFATE 2 PUFF: 108 INHALANT RESPIRATORY (INHALATION) at 14:16

## 2023-10-09 RX ADMIN — PROPOFOL 60 MCG/KG/MIN: 10 INJECTION, EMULSION INTRAVENOUS at 14:18

## 2023-10-09 NOTE — DISCHARGE SUMMARY
Discharge Summary Outpatient Procedure Podiatry -   [facility] 70 y.o. male MRN: [id number]  Unit/Bed#: OR POOL Encounter: [id number]    Admission Date: 10/9/2023     Admitting Diagnosis: Osteomyelitis of toe of left foot ([address] [facility]) [M86.9]  History of amputation of left great toe [facility]) [E73.172]    Discharge Diagnosis: same    Procedures Performed: AMPUTATION TOE, 2nd: 51268 (CPT®)    Complications: none    Condition at Discharge: stable    Discharge instructions/Information to patient and family:   See after visit summary for information provided to patient and family. Provisions for Follow-Up Care/Important appointments:  See after visit summary for information related to follow-up care and any pertinent home health orders. Discharge Medications:  See after visit summary for reconciled discharge medications provided to patient and family.

## 2023-10-09 NOTE — ANESTHESIA POSTPROCEDURE EVALUATION
Post-Op Assessment Note    CV Status:  Stable  Pain Score: 0    Pain management: adequate     Mental Status:  Alert and awake   Hydration Status:  Euvolemic   PONV Controlled:  Controlled   Airway Patency:  Patent   Two or more mitigation strategies used for obstructive sleep apnea   Post Op Vitals Reviewed: Yes      Staff: Anesthesiologist, CRNA         No notable events documented.     BP   111/57   Temp 98   Pulse 84   Resp 12   SpO2 93

## 2023-10-09 NOTE — OP NOTE
OPERATIVE REPORT - Podiatry  PATIENT NAME: Halle Mock    :  1960  MRN: [id number]  Pt Location: [facility] OR ROOM 02    SURGERY DATE: 10/9/2023    Surgeon(s) and Role:     * Cherri Hidalgo DPM - Primary     * Army Amelia DPM - Assisting    Pre-op Diagnosis:  Osteomyelitis of toe of left foot ([address]) [M86.9]  History of amputation of left great toe ([address]) [Z89.412]    Post-Op Diagnosis Codes:     * Osteomyelitis of toe of left foot ([address]) [M86.9]     * History of amputation of left great toe (HCC) [Z89.412]    Procedure(s) (LRB):  AMPUTATION TOE, 2nd (Left)    Specimen(s):  ID Type Source Tests Collected by Time Destination   1 : left second toe Tissue Toe, Left TISSUE EXAM Cherri Hidalgo DPM 10/9/2023 1420        Estimated Blood Loss:   Minimal    Drains:  * No LDAs found *    Anesthesia Type:   Conscious Sedation  with 10 ml of 1% Lidocaine and 0.5% Bupivacaine in a 1:1 mixture    Hemostasis:  Direct compression, electrocautery    Materials:  * No implants in log *      Injectables:  None    Operative Findings:  Consistent with Diagnosis    Complications:   None    Procedure and Technique:     Under mild sedation, the patient was brought into the operating room and remained on their litter in the supine position. IV sedation was achieved by anesthesia team and a universal timeout was performed where all parties are in agreement of correct patient, correct procedure and correct site. A regional block was performed using local anesthesia. The foot was then prepped and draped in the usual aseptic manner. Attention was drawn to the left 2nd digit. Incision was drain out with a marking pen and the incision was made with #15 blade circumferentially around digit. Using a towel clamp and blade the toe was disarticulated at the MPJ. Specimen was sent to pathology. Amputation site was cleaned of all non-viable soft-tissue.  When healthy bleeding base remained with bleeding surrounding skin edges the site was irrigated with copious amounts of sterile saline. Skin edges were approximated with Nylon suture without tension on skin. Foot was cleaned and dried. The incision site was dressed with adaptic, gauze. This was then covered with a Lester and an ACE wrap. The tourniquet was deflated and normal hyperemic response was noted to all digits. The patient tolerated the procedure and anesthesia well without immediate complications and transferred to PACU with vital signs stable. As with many limb salvage procedures, we contemplate the possibility of performing further stages to this procedure. Procedures may include debridements, delayed closure, plastic surgery techniques, or more proximal amputations. This procedure may be considered part of a multi-staged limb salvage treatment plan. Dr. Lowell Valdovinos was present during the entire procedure and participated in all key aspects. SIGNATURE: Zeferino Babin DPM  DATE: October 9, 2023  TIME: 2:45 PM      Portions of the record may have been created with voice recognition software. Occasional wrong word or "sound a like" substitutions may have occurred due to the inherent limitations of voice recognition software. Read the chart carefully and recognize, using context, where substitutions have occurred.

## 2023-10-09 NOTE — ANESTHESIA PREPROCEDURE EVALUATION
Procedure:  AMPUTATION TOE, 2nd (Left: Toe)    Relevant Problems   CARDIO   (+) Aortic thrombus (HCC)   (+) Essential hypertension   (+) Hyperlipidemia associated with type 2 diabetes mellitus    (+) Mixed hyperlipidemia      ENDO   (+) Type 2 diabetes mellitus with diabetic polyneuropathy (HCC)   (+) Type 2 diabetes mellitus, with long-term current use of insulin (HCC)      GI/HEPATIC   (+) Chronic hepatitis C without hepatic coma (HCC)   (+) Gastroesophageal reflux disease      MUSCULOSKELETAL   (+) Chronic bilateral low back pain      NEURO/PSYCH   (+) Chronic bilateral low back pain   (+) Chronic pain syndrome   (+) Depression   (+) Diabetic neuropathy (HCC)      PULMONARY   (+) Acute respiratory failure with hypoxia (HCC)      Other   (+) Osteomyelitis of toe of left foot (HCC)        Physical Exam    Airway    Mallampati score: I  TM Distance: >3 FB  Neck ROM: full     Dental   Comment: Multiple missing and chipped teeth, no loose per patient,     Cardiovascular  Rhythm: regular, Rate: normal,     Pulmonary  Breath sounds clear to auscultation,     Other Findings        Anesthesia Plan  ASA Score- 4     Anesthesia Type- IV sedation with anesthesia with ASA Monitors. Additional Monitors:   Airway Plan:     Comment: Local by surgeon. Plan Factors-Exercise tolerance (METS): <4 METS. Chart reviewed. EKG reviewed. Patient summary reviewed. Patient is a current smoker. Patient instructed to abstain from smoking on day of procedure. Patient smoked on day of surgery. Obstructive sleep apnea risk education given perioperatively. Induction-     Postoperative Plan-     Informed Consent- Anesthetic plan and risks discussed with patient. I personally reviewed this patient with the CRNA. Discussed and agreed on the Anesthesia Plan with the CRNA. Servando Pineda

## 2023-10-09 NOTE — DISCHARGE INSTR - AVS FIRST PAGE
Dr. Ziggy Blanton Instructions    1. Take your prescribed medication as directed. 2. Upon arrival at home, lie down and elevate your surgical foot on 2 pillows. 3. Stay off your feet as much as possible for the first 24-48 hours. This is when your feet first swell and may become painful. After 48 hours you may begin limited walking following these restrictions:      Weight-bearing as tolerated to the heel in surgical shoe. 4. Drink large quantities of water. Consume no alcohol. Continue a well-balanced diet. 5. Report any unusual discomfort or fever to this office. 6. A limited amount of discomfort and swelling is to be expected. In some cases the skin may take on a bruised appearance. The surgical cleansing solution that was applied to your foot prior to the operation is dark in color and the operation site may appear to be oozing when it actually is not. 7. A slight amount of blood is to be expected, and is no cause for alarm. Do not remove the dressings. If there is active bleeding and if the bleeding persists, add additional gauze to the bandage, apply direct pressure, elevate your feet and call this office. 8. Do not get the dressings wet. As regular bathing may be inconvenient, sponge baths are recommended. 9. When anesthesia wears off and if any discomfort should be present, apply an ice pack directly over the operated area for 15 minute intervals for several hours or until the pain leaves. (USE IN EXCESS OF 15 MINUTES COULD CAUSE FROSTBITE). Do not use hot water bags or electric pads. A convenient icepack can be made by placing ice cubes in a plastic bag and covering this with a towel. 10. Take over-the-counter laxative for constipation, this is common with use of narcotic medications.

## 2023-10-10 LAB — GLUCOSE SERPL-MCNC: 208 MG/DL (ref 65–140)

## 2023-10-13 ENCOUNTER — OFFICE VISIT (OUTPATIENT)
Dept: PODIATRY | Facility: CLINIC | Age: 63
End: 2023-10-13
Payer: MEDICARE

## 2023-10-13 VITALS — WEIGHT: 178 LBS | HEIGHT: 68 IN | BODY MASS INDEX: 26.98 KG/M2

## 2023-10-13 DIAGNOSIS — M86.9 OSTEOMYELITIS OF TOE OF LEFT FOOT (HCC): Primary | ICD-10-CM

## 2023-10-13 PROCEDURE — 99213 OFFICE O/P EST LOW 20 MIN: CPT | Performed by: PODIATRIST

## 2023-10-13 NOTE — PROGRESS NOTES
PATIENT:  Payton Ochoa      1960    ASSESSMENT     1. Osteomyelitis of toe of left foot [facility])               PLAN  Patient is doing well post-operatively. Sutures left intact. Incision was cleaned with betadine and DSD applied to be kept C/D/I. Continue post-op care as instructed. Stressed on patient compliance about proper off-loading, staying off of feet, and proper dressing care. Call if any increase in pain, fevers, calf pain, shortness of breath, or general distress is noted. Patient instructed to go to ER if call is not returned immediately.  -Clonus noted on exam today, this is likely reason for severe interval worsening of the hammertoe deformities 2, 3, 4, 5  - We will plan for flexor tendon release following healing incision left foot with individual toe weekly beginning at the left third once incisional line is healed    HISTORY OF PRESENT ILLNESS  Patient presents for post-op appointment. Post-op pain is under control and resolving well. The patient is feeling well and in good spirits. Patient reported no post-op concern. Patient relates compliance with surgical shoe, elevation, and keeping dressing clean, dry and intact. REVIEW OF SYSTEMS  GENERAL: No fever or chills. HEART: No chest pain, or palpitation  RESPIRATORY:  No SOB or cough  GI: No Nausea, vomit or diarrhea  NEUROLOGIC: No syncope or acute weakness  MUSCULOSKELETAL: No calf pain or edema. PHYSICAL EXAMINATION  GENERAL  The patient appears in NAD / non-toxic. Afebrile. VSS    VASCULAR EXAM  Pedal pulses and vascular status are intact. No calf pain or edema bilaterally. No cyanosis. DERMATOLOGIC EXAM  Incision is coapted and healing well. No signs of infection. No active drainage. Normal post-op edema and ecchymosis. No necrosis or dehiscence. NEUROLOGIC EXAM  AAO X 3. No focal neurologic deficit. Neurologic status is intact BLE. MUSCULOSKELETAL EXAM  Good surgical correction.   Normal post-op findings. ROM intact. No fluctuation or crepitus. With dorsiflexion of foot there is significant clonus.   Around 2-3 beats

## 2023-10-17 DIAGNOSIS — E11.42 TYPE 2 DIABETES MELLITUS WITH DIABETIC POLYNEUROPATHY, WITH LONG-TERM CURRENT USE OF INSULIN (HCC): ICD-10-CM

## 2023-10-17 DIAGNOSIS — Z79.4 TYPE 2 DIABETES MELLITUS WITH DIABETIC POLYNEUROPATHY, WITH LONG-TERM CURRENT USE OF INSULIN (HCC): ICD-10-CM

## 2023-10-17 PROCEDURE — 88305 TISSUE EXAM BY PATHOLOGIST: CPT | Performed by: STUDENT IN AN ORGANIZED HEALTH CARE EDUCATION/TRAINING PROGRAM

## 2023-10-17 PROCEDURE — 88311 DECALCIFY TISSUE: CPT | Performed by: STUDENT IN AN ORGANIZED HEALTH CARE EDUCATION/TRAINING PROGRAM

## 2023-10-17 RX ORDER — INSULIN LISPRO 100 [IU]/ML
INJECTION, SOLUTION INTRAVENOUS; SUBCUTANEOUS
Qty: 15 ML | Refills: 3 | Status: SHIPPED | OUTPATIENT
Start: 2023-10-17

## 2023-10-19 ENCOUNTER — OFFICE VISIT (OUTPATIENT)
Dept: PODIATRY | Facility: CLINIC | Age: 63
End: 2023-10-19

## 2023-10-19 VITALS
HEART RATE: 90 BPM | SYSTOLIC BLOOD PRESSURE: 132 MMHG | DIASTOLIC BLOOD PRESSURE: 70 MMHG | BODY MASS INDEX: 27.06 KG/M2 | HEIGHT: 68 IN

## 2023-10-19 DIAGNOSIS — L03.032 CELLULITIS OF TOE OF LEFT FOOT: ICD-10-CM

## 2023-10-19 DIAGNOSIS — M86.9 OSTEOMYELITIS OF TOE OF LEFT FOOT (HCC): Primary | ICD-10-CM

## 2023-10-19 NOTE — PROGRESS NOTES
Assessment/Plan:      Diagnoses and all orders for this visit:    Osteomyelitis of toe of left foot (HCC)    Cellulitis of toe of left foot      -Surgical cure from osteomyelitis, cellulitis appears resolved, ecchymosis consistent with postoperative course  - Dressing applied, return 1 week for suture removal    Subjective:     Patient ID: Yi Laureano is a 70 y.o. male. Patient presents for evaluation management of his left second toe amputation, having no pain, did have dressing intact, has no new pedal complaints no drainage. States that the cellulitis has gotten much better        Review of Systems   Constitutional:  Negative for chills and fever. HENT:  Negative for ear pain and sore throat. Eyes:  Negative for pain and visual disturbance. Respiratory:  Negative for cough and shortness of breath. Cardiovascular:  Negative for chest pain and palpitations. Gastrointestinal:  Negative for abdominal pain and vomiting. Genitourinary:  Negative for dysuria and hematuria. Musculoskeletal:  Negative for arthralgias and back pain. Skin:  Negative for color change and rash. Neurological:  Negative for seizures and syncope. All other systems reviewed and are negative.         Objective:     Physical Exam  Musculoskeletal:      Comments: No evidence of dehiscence, swelling and ecchymosis is consistent with postoperative course suture line is intact minimal drainage Infusion Nursing Note:  Ifrah Huitron presents today for Gemzar C2D8/IVF/1 unit PRBCs.    Patient seen by provider today: No   present during visit today: Not Applicable.    Note: Patient states he is still recovering from last week. It takes about 4 days post chemo before he starts to feel better and less achy. He is very fatigued.     Intravenous Access:  Implanted Port.    Treatment Conditions:  Lab Results   Component Value Date    HGB 7.6 (L) 09/15/2022    WBC 3.9 (L) 09/15/2022    ANEU 2.1 09/15/2022    ANEUTAUTO 6.5 09/08/2022     09/15/2022      Lab Results   Component Value Date     09/15/2022    POTASSIUM 3.6 09/15/2022    MAG 2.1 09/01/2022    CR 1.08 09/15/2022    COTY 8.7 (L) 09/15/2022    BILITOTAL 0.2 09/15/2022    ALBUMIN 3.5 09/15/2022    ALT 44 09/15/2022    AST 30 09/15/2022   Results reviewed, labs MET treatment parameters, ok to proceed with treatment.  Hgb 7.6; meets parameters for PRBCs transfusion.    Post Infusion Assessment:  Patient tolerated infusion without incident.  Blood return noted pre and post infusion.  Site patent and intact, free from redness, edema or discomfort.  No evidence of extravasations.  Access discontinued per protocol.     Discharge Plan:   Discharge instructions reviewed with: Patient.  Patient and/or family verbalized understanding of discharge instructions and all questions answered.  AVS to patient via [employer].  Patient will return 9/22/2022 for next appointment.   Patient discharged in stable condition accompanied by: self.  Departure Mode: Ambulatory.    Erin Lowe RN

## 2023-10-25 ENCOUNTER — OFFICE VISIT (OUTPATIENT)
Dept: PODIATRY | Facility: CLINIC | Age: 63
End: 2023-10-25
Payer: MEDICARE

## 2023-10-25 VITALS
HEART RATE: 100 BPM | DIASTOLIC BLOOD PRESSURE: 73 MMHG | BODY MASS INDEX: 27.06 KG/M2 | HEIGHT: 68 IN | SYSTOLIC BLOOD PRESSURE: 117 MMHG

## 2023-10-25 DIAGNOSIS — M86.9 OSTEOMYELITIS OF TOE OF LEFT FOOT (HCC): Primary | ICD-10-CM

## 2023-10-25 PROCEDURE — 99213 OFFICE O/P EST LOW 20 MIN: CPT | Performed by: PODIATRIST

## 2023-10-25 NOTE — PROGRESS NOTES
Assessment/Plan:      Diagnoses and all orders for this visit:    Osteomyelitis of toe of left foot ([address])      -He is to return in 2 weeks for continued care of this area, he will need flexor tenotomy versus hammertoe releases of the remaining digits due to the spastic issues with clonus  -Surgical site is resolved, no further care necessary    Subjective:     Patient ID: Griselda Garcia is a 70 y.o. male. Patient approximate 3-week status post second digit toe amputation left, no new pedal complaints. Review of Systems   Constitutional:  Negative for chills and fever. HENT:  Negative for ear pain and sore throat. Eyes:  Negative for pain and visual disturbance. Respiratory:  Negative for cough and shortness of breath. Cardiovascular:  Negative for chest pain and palpitations. Gastrointestinal:  Negative for abdominal pain and vomiting. Genitourinary:  Negative for dysuria and hematuria. Musculoskeletal:  Negative for arthralgias and back pain. Skin:  Negative for color change and rash. Neurological:  Negative for seizures and syncope. All other systems reviewed and are negative. Objective:     Physical Exam  Musculoskeletal:      Comments: No issues to the left foot, continued loss of the left hallux and left second digit.   Dehiscence following suture removal.  Continue hammertoe deformity to the left 3 digits Adequate: hears normal conversation without difficulty

## 2023-10-30 ENCOUNTER — TELEPHONE (OUTPATIENT)
Dept: PODIATRY | Facility: CLINIC | Age: 63
End: 2023-10-30

## 2023-10-30 NOTE — TELEPHONE ENCOUNTER
Caller: Olivia Valdez    Doctor: Brielle Stone, DPM    Reason for call: There is a white drainage where the stitches were removed. There is also a white patch next to it. Nolan put calcium alganate on it and wrapped it in a bandage. He asked if an antibiotic could be called in. One that is not hard on his stomach. He cannot take Doxicycline.       Call back#: [phone]

## 2023-10-31 DIAGNOSIS — L03.116 CELLULITIS OF LEFT FOOT: Primary | ICD-10-CM

## 2023-10-31 RX ORDER — CEPHALEXIN 500 MG/1
500 CAPSULE ORAL EVERY 6 HOURS SCHEDULED
Qty: 28 CAPSULE | Refills: 0 | Status: SHIPPED | OUTPATIENT
Start: 2023-10-31 | End: 2023-11-07

## 2023-11-01 ENCOUNTER — TELEPHONE (OUTPATIENT)
Age: 63
End: 2023-11-01

## 2023-11-01 NOTE — TELEPHONE ENCOUNTER
Caller: Nolan    Doctor: Shannon Gregory     Reason for call: Cx today's appt stated it looks great and he will keep his original appt for next week.      Call back#: [phone]

## 2023-11-02 NOTE — PRE-PROCEDURE INSTRUCTIONS
Pre-Surgery Instructions:   Medication Instructions   • acetaminophen (TYLENOL) 325 mg tablet Uses PRN- OK to take day of surgery   • albuterol (PROVENTIL HFA,VENTOLIN HFA) 90 mcg/act inhaler Uses PRN- OK to take day of surgery   • aspirin 81 mg chewable tablet Per Dr Ainsley Cook, no hold required   • atorvastatin (LIPITOR) 20 mg tablet Take day of surgery  • cholecalciferol (VITAMIN D3) 1,000 units tablet Stop taking 7 days prior to surgery  • Diclofenac Sodium (VOLTAREN) 1 % Stop taking 7 days prior to surgery  • DULoxetine (CYMBALTA) 60 mg delayed release capsule Take day of surgery  • Eliquis 5 MG Per Dr Ainsley Cook pt is to hold 1 day prior to sx  • famotidine (PEPCID) 20 mg tablet Take day of surgery  • fluticasone (FLONASE) 50 mcg/act nasal spray Take day of surgery  • insulin glargine (LANTUS) 100 units/mL subcutaneous injection Take day of surgery  • insulin lispro (HumaLOG KwikPen) 100 units/mL injection pen Hold day of surgery  • Janumet  MG per tablet Hold day of surgery  • Jardiance 25 MG TABS Hold day of surgery  • levocetirizine (XYZAL) 5 MG tablet Take night before surgery   • lisinopril (ZESTRIL) 10 mg tablet Hold day of surgery  • magnesium oxide (MAG-OX) 400 mg Stop taking 7 days prior to surgery  • methadone (DOLOPHINE) 10 mg tablet Take day of surgery  • multivitamin-minerals (CENTRUM ADULTS) tablet Stop taking 7 days prior to surgery  • nicotine (NICODERM CQ) 21 mg/24 hr TD 24 hr patch Take day of surgery  • ondansetron (ZOFRAN-ODT) 4 mg disintegrating tablet Uses PRN- OK to take day of surgery   • pantoprazole (PROTONIX) 40 mg tablet Take day of surgery  • silver sulfadiazine (SILVADENE,SSD) 1 % cream Hold day of surgery  Phone appt completed with pt's belkys Cruz at pt's request Med list reviewed as above  Also instructedt not to start any new vitamins/supplements preoperatively and to avoid NSAID's  7 days prior to surgery   Tylenol is acceptable if needed  Pt has and/or is getting CHG surgical soap and verbalizes understanding of preoperative showering protocol  All information within "My Surgical Experience" pamphlet reviewed and patient verbalizes understanding and compliance  All questions answered  Male

## 2023-11-08 ENCOUNTER — OFFICE VISIT (OUTPATIENT)
Dept: PODIATRY | Facility: CLINIC | Age: 63
End: 2023-11-08
Payer: MEDICARE

## 2023-11-08 VITALS
DIASTOLIC BLOOD PRESSURE: 74 MMHG | SYSTOLIC BLOOD PRESSURE: 133 MMHG | BODY MASS INDEX: 27.06 KG/M2 | HEART RATE: 96 BPM | HEIGHT: 68 IN

## 2023-11-08 DIAGNOSIS — M86.9 OSTEOMYELITIS OF TOE OF LEFT FOOT (HCC): Primary | ICD-10-CM

## 2023-11-08 DIAGNOSIS — L03.032 CELLULITIS OF TOE OF LEFT FOOT: ICD-10-CM

## 2023-11-08 DIAGNOSIS — E11.49 OTHER DIABETIC NEUROLOGICAL COMPLICATION ASSOCIATED WITH TYPE 2 DIABETES MELLITUS (HCC): ICD-10-CM

## 2023-11-08 DIAGNOSIS — B35.1 ONYCHOMYCOSIS: ICD-10-CM

## 2023-11-08 PROCEDURE — 99213 OFFICE O/P EST LOW 20 MIN: CPT | Performed by: PODIATRIST

## 2023-11-08 PROCEDURE — 11720 DEBRIDE NAIL 1-5: CPT | Performed by: PODIATRIST

## 2023-11-08 NOTE — PROGRESS NOTES
Assessment/Plan:      Diagnoses and all orders for this visit:    Osteomyelitis of toe of left foot (HCC)    Cellulitis of toe of left foot    Onychomycosis    Other diabetic neurological complication associated with type 2 diabetes mellitus ([address])      -No further care necessary for amputation site  - Return in 9 weeks for continued at risk foot care  - Nail care provided as below  - he is to go get his diabetic shoes to be very vigilant for any rub areas  - If there is any dorsal pressure overlying the remaining digits we will plan for an office flexor tenotomy to hopefully prevent retrograde force      Procedure: All mycotic toenails were reduced and debrided in length, width, and girth using a nail nipper and dremel. All hyperkeratotic skin lesion(s) were sharply pared with a scalpel with no bleeding or evidence of ulceration. Patient tolerated procedure(s) well without complications. 15185, Q7    Subjective:     Patient ID: Rajwinder Richards is a 70 y.o. male. Patient presents for evaluation management of his left foot. Complaining of no drainage, here for final sign off on the toe and possible nail care        Review of Systems   Constitutional:  Negative for chills and fever. HENT:  Negative for ear pain and sore throat. Eyes:  Negative for pain and visual disturbance. Respiratory:  Negative for cough and shortness of breath. Cardiovascular:  Negative for chest pain and palpitations. Gastrointestinal:  Negative for abdominal pain and vomiting. Genitourinary:  Negative for dysuria and hematuria. Musculoskeletal:  Negative for arthralgias and back pain. Skin:  Negative for color change and rash. Neurological:  Negative for seizures and syncope. All other systems reviewed and are negative. Objective:     Physical Exam  Vitals reviewed. Constitutional:       Appearance: Normal appearance. HENT:      Head: Normocephalic and atraumatic.       Nose: Nose normal. Mouth/Throat:      Mouth: Mucous membranes are moist.   Eyes:      Pupils: Pupils are equal, round, and reactive to light. Musculoskeletal:      Comments: There is symptomatic lesser digits to the 345 the left foot, there are hammertoe orientations with adductovarus and drift, there nails are intact thickened yellowed Q7 findings       Skin:     Capillary Refill: Capillary refill takes 2 to 3 seconds. Neurological:      General: No focal deficit present. Mental Status: He is alert and oriented to person, place, and time. Mental status is at baseline.

## 2023-11-13 ENCOUNTER — OFFICE VISIT (OUTPATIENT)
Dept: FAMILY MEDICINE CLINIC | Facility: CLINIC | Age: 63
End: 2023-11-13
Payer: MEDICARE

## 2023-11-13 VITALS
TEMPERATURE: 98.6 F | DIASTOLIC BLOOD PRESSURE: 70 MMHG | HEART RATE: 89 BPM | HEIGHT: 68 IN | OXYGEN SATURATION: 97 % | BODY MASS INDEX: 27.06 KG/M2 | SYSTOLIC BLOOD PRESSURE: 132 MMHG

## 2023-11-13 DIAGNOSIS — Z89.611 S/P AKA (ABOVE KNEE AMPUTATION), RIGHT (HCC): ICD-10-CM

## 2023-11-13 DIAGNOSIS — L89.150 PRESSURE ULCER OF SACRAL REGION, UNSTAGEABLE (HCC): ICD-10-CM

## 2023-11-13 DIAGNOSIS — Z79.4 TYPE 2 DIABETES MELLITUS WITH HYPERGLYCEMIA, WITH LONG-TERM CURRENT USE OF INSULIN (HCC): ICD-10-CM

## 2023-11-13 DIAGNOSIS — E11.65 TYPE 2 DIABETES MELLITUS WITH HYPERGLYCEMIA, WITH LONG-TERM CURRENT USE OF INSULIN (HCC): ICD-10-CM

## 2023-11-13 DIAGNOSIS — I10 ESSENTIAL HYPERTENSION: Primary | Chronic | ICD-10-CM

## 2023-11-13 DIAGNOSIS — E27.40 UNSPECIFIED ADRENOCORTICAL INSUFFICIENCY (HCC): ICD-10-CM

## 2023-11-13 DIAGNOSIS — M86.9 OSTEOMYELITIS OF TOE OF LEFT FOOT (HCC): ICD-10-CM

## 2023-11-13 DIAGNOSIS — J96.01 ACUTE RESPIRATORY FAILURE WITH HYPOXIA (HCC): ICD-10-CM

## 2023-11-13 DIAGNOSIS — I69.359 CVA, OLD, HEMIPARESIS (HCC): ICD-10-CM

## 2023-11-13 DIAGNOSIS — G54.7 PHANTOM LIMB (HCC): ICD-10-CM

## 2023-11-13 DIAGNOSIS — Z23 ENCOUNTER FOR IMMUNIZATION: ICD-10-CM

## 2023-11-13 PROCEDURE — 90662 IIV NO PRSV INCREASED AG IM: CPT

## 2023-11-13 PROCEDURE — 99214 OFFICE O/P EST MOD 30 MIN: CPT | Performed by: NURSE PRACTITIONER

## 2023-11-13 PROCEDURE — G0008 ADMIN INFLUENZA VIRUS VAC: HCPCS

## 2023-11-13 NOTE — PROGRESS NOTES
Name: Edita Tomlin      : 1960      MRN: [id number]  Encounter Provider: VERONICA Adkins  Encounter Date: 2023   Encounter department: [address] [place] [address]. 58     1. Essential hypertension    2. Encounter for immunization  -     influenza vaccine, high-dose, PF 0.7 mL (FLUZONE HIGH-DOSE)    3. Type 2 diabetes mellitus with hyperglycemia, with long-term current use of insulin ([facility])  Comments:  continue f.u with endo    4. CVA, old, hemiparesis ([address])    5. Acute respiratory failure with hypoxia ([facility])  Comments:  resolved    6. Pressure ulcer of sacral region, unstageable ([address])  Comments:  resolved    7. Unspecified adrenocortical insufficiency ([address])    8. Osteomyelitis of toe of left foot ([address])    9. S/P AKA (above knee amputation), right ([address])    10. Phantom limb ([address])           Subjective      Presents for routine follow-up. Overall patient is doing well without any specific complaints today. He has continued to experience phantom limb pain and plans to follow-up with pain management for this. Conservative measures like massage and TENS unit reviewed with patient and he verbalized understanding. He does continue continue to follow-up with podiatry after amputation. He does continue to follow with GI and states that his nausea has significantly improved with Compazine. Pressure was elevated when he first arrived but did smoke a cigarette right before coming in. After sitting for a few minutes blood pressure did improve. Recommend patient check his blood pressure at home 2-3 times per week and keep a log to bring in in approximately 2 weeks. Verbalized understanding. Review of Systems   Constitutional:  Negative for chills and fever. HENT:  Negative for ear pain and sore throat. Eyes:  Negative for pain and visual disturbance. Respiratory:  Negative for cough and shortness of breath.     Cardiovascular:  Negative for chest pain and palpitations. Gastrointestinal:  Positive for nausea and vomiting (x1 per week). Negative for abdominal pain. Genitourinary:  Negative for dysuria and hematuria. Musculoskeletal:  Positive for myalgias. Negative for arthralgias and back pain. Skin:  Negative for color change and rash. Neurological:  Negative for seizures and syncope. All other systems reviewed and are negative.       Current Outpatient Medications on File Prior to Visit   Medication Sig   • acetaminophen (TYLENOL) 325 mg tablet Take 3 tablets (975 mg total) by mouth every 8 (eight) hours   • albuterol (PROVENTIL HFA,VENTOLIN HFA) 90 mcg/act inhaler Inhale 2 puffs every 4 (four) hours as needed for wheezing   • apixaban (Eliquis) 5 mg Take 1 tablet (5 mg total) by mouth 2 (two) times a day   • aspirin 81 mg chewable tablet Chew 81 mg daily   • atorvastatin (LIPITOR) 20 mg tablet Take 1 tablet (20 mg total) by mouth daily   • Blood Glucose Monitoring Suppl (OneTouch Verio) w/Device KIT Use to test blood sugars 3 times daily   • cholecalciferol (VITAMIN D3) 1,000 units tablet Take 2 tablets (2,000 Units total) by mouth daily (Patient taking differently: Take 2,000 Units by mouth daily)   • Continuous Blood Gluc  (FreeStyle [place] 2 [place]) JOSEPH Use as directed   • Continuous Blood Gluc Sensor (FreeStyle Ke 2 Sensor) MISC Change every 14 days   • Diclofenac Sodium (VOLTAREN) 1 % Apply 4 g topically 4 (four) times a day as needed (as needed for pain)   • DULoxetine (CYMBALTA) 60 mg delayed release capsule Take 1 capsule (60 mg total) by mouth daily at bedtime   • Empagliflozin (Jardiance) 25 MG TABS Take 1 tablet (25 mg total) by mouth every morning (Patient taking differently: Take 25 mg by mouth every morning NOT IN HOME)   • famotidine (PEPCID) 40 MG tablet Take 1 tablet (40 mg total) by mouth daily at bedtime as needed for heartburn   • ferrous sulfate 324 (65 Fe) mg Take 1 tablet (324 mg total) by mouth daily before breakfast • fluticasone (FLONASE) 50 mcg/act nasal spray 1 spray into each nostril daily   • glucose blood test strip Use 1 each 3 (three) times daily after meals Use as instructed   • insulin degludec Dru Abdiaziz FlexTouch) 100 units/mL injection pen Inject 30 units at dinner time daily   • insulin lispro (HumaLOG KwikPen) 100 units/mL injection pen Inject 5 units with  brunch and 5 units with dinner +scale   • Insulin Pen Needle 31G X 5 MM MISC 30 units sq 2X daily   • Insulin Syringe-Needle U-100 (BD Veo Insulin Syringe U/F) 31G X 15/64" 0.5 ML MISC Inject under the skin 3 (three) times a day Use as directed   • levocetirizine (XYZAL) 5 MG tablet Take 1 tablet (5 mg total) by mouth every evening (Patient taking differently: Take 5 mg by mouth every evening NOT IN HOME)   • lidocaine (LMX) 4 % cream Apply topically 4 (four) times a day as needed for mild pain   • lisinopril (ZESTRIL) 10 mg tablet Take 1 tablet (10 mg total) by mouth daily   • magnesium oxide (MAG-OX) 400 mg Take 1 tablet (400 mg total) by mouth daily   • methadone (DOLOPHINE) 10 mg tablet Take 70 mg by mouth daily,   • multivitamin-minerals (CENTRUM ADULTS) tablet Take 1 tablet by mouth daily   • naloxone (NARCAN) 4 mg/0.1 mL nasal spray Administer 1 spray into a nostril. If no response after 2-3 minutes, give another dose in the other nostril using a new spray.    • pantoprazole (PROTONIX) 40 mg tablet Take 1 PO BID 30 mins prior to a meal.   • polyethylene glycol (GLYCOLAX) 17 GM/SCOOP powder Take 17 g by mouth 2 (two) times a day (Patient taking differently: Take 17 g by mouth 2 (two) times a day pt only taking 1 x daily)   • prochlorperazine (COMPAZINE) 5 mg tablet Take 1 tablet (5 mg total) by mouth every 6 (six) hours as needed for nausea or vomiting   • silver sulfadiazine (SILVADENE,SSD) 1 % cream Apply topically if needed (open wound)   • sitaGLIPtin-metFORMIN (Janumet)  MG per tablet Take 1 tablet by mouth 2 (two) times a day with meals   • traZODone (DESYREL) 50 mg tablet Take 1 tablet (50 mg total) by mouth daily at bedtime as needed for sleep   • Insulin Pen Needle 31G X 5 MM MISC Use daily Pt to inject 4 X daily (Patient not taking: Reported on 11/13/2023)   • MAGnesium-Oxide 400 (240 Mg) MG TABS Take 400 mg by mouth every morning (Patient not taking: Reported on 9/25/2023)   • nicotine (NICODERM CQ) 21 mg/24 hr TD 24 hr patch Place 1 patch on the skin every 24 hours (Patient not taking: Reported on 9/25/2023)       Objective     /70   Pulse 89   Temp 98.6 °F (37 °C)   Ht 5' 8" (1.727 m)   SpO2 97%   BMI 27.06 kg/m²     Physical Exam  Vitals and nursing note reviewed. Constitutional:       General: He is not in acute distress. Appearance: Normal appearance. He is normal weight. He is not ill-appearing or toxic-appearing. Cardiovascular:      Rate and Rhythm: Normal rate and regular rhythm. Pulses: Normal pulses. Heart sounds: Normal heart sounds. No murmur heard. No friction rub. No gallop. Pulmonary:      Effort: Pulmonary effort is normal.      Breath sounds: Normal breath sounds. No wheezing, rhonchi or rales. Abdominal:      General: Abdomen is flat. Bowel sounds are normal.      Palpations: Abdomen is soft. Musculoskeletal:         General: Normal range of motion. Right lower leg: No edema. Left lower leg: No edema. Skin:     General: Skin is warm. Capillary Refill: Capillary refill takes less than 2 seconds. Findings: No bruising or lesion. Neurological:      General: No focal deficit present. Mental Status: He is alert and oriented to person, place, and time. Mental status is at baseline. Motor: No weakness. Gait: Gait normal.   Psychiatric:         Mood and Affect: Mood normal.         Behavior: Behavior normal.         Thought Content:  Thought content normal.         Judgment: Judgment normal.       VERONICA Mcknight

## 2023-11-14 ENCOUNTER — TELEPHONE (OUTPATIENT)
Dept: RHEUMATOLOGY | Facility: CLINIC | Age: 63
End: 2023-11-14

## 2023-11-14 ENCOUNTER — TELEPHONE (OUTPATIENT)
Dept: ADMINISTRATIVE | Facility: OTHER | Age: 63
End: 2023-11-14

## 2023-11-14 NOTE — LETTER
Diabetic Eye Exam Form    Date Requested: 23  Patient: Alyssala Stable  Patient : 1960   Referring Provider: VERONICA Alanis      DIABETIC Eye Exam Date _______________________________      Type of Exam MUST be documented for Diabetic Eye Exams. Please CHECK ONE. Retinal Exam       Dilated Retinal Exam       OCT       Optomap-Iris Exam      Fundus Photography       Left Eye - Please check Retinopathy or No Retinopathy        Exam did show retinopathy    Exam did not show retinopathy       Right Eye - Please check Retinopathy or No Retinopathy       Exam did show retinopathy    Exam did not show retinopathy       Comments __________________________________________________________    Practice Providing Exam ______________________________________________    Exam Performed By (print name) _______________________________________      Provider Signature ___________________________________________________      These reports are needed for  compliance. Please fax this completed form and a copy of the Diabetic Eye Exam report to our office located at [address] [place] [address] as soon as possible via Fax [phone] janet Jeff Reveal: Phone [phone]  We thank you for your assistance in treating our mutual patient.

## 2023-11-14 NOTE — TELEPHONE ENCOUNTER
Upon review of the In Basket request and the patient's chart, initial outreach has been made via fax to facility. Please see Contacts section for details.      Thank you  Edilma Artis MA

## 2023-11-14 NOTE — TELEPHONE ENCOUNTER
----- Message from Vandana Castaneda sent at 11/13/2023  4:22 PM EST -----  Regarding: diabetic eye exam  11/13/23 4:22 PM    Hello, our patient Kirk Green has had Diabetic Eye Exam completed/performed. Please assist in updating the patient chart by making an External outreach to [facility] facility located in [address] [place] [address]. The date of service is 2023.     Thank you,  Sydney MUKHERJEE

## 2023-11-15 NOTE — TELEPHONE ENCOUNTER
Upon review of the In Basket request we have found as a result of outreach that patient did not have the requested item(s) completed. Any additional questions or concerns should be emailed to the Practice Liaisons via the appropriate education email address, please do not reply via In Basket.     Thank you  Mayela Darnell MA

## 2023-11-16 DIAGNOSIS — E11.42 TYPE 2 DIABETES MELLITUS WITH DIABETIC POLYNEUROPATHY, WITH LONG-TERM CURRENT USE OF INSULIN (HCC): ICD-10-CM

## 2023-11-16 DIAGNOSIS — Z79.4 TYPE 2 DIABETES MELLITUS WITH DIABETIC POLYNEUROPATHY, WITH LONG-TERM CURRENT USE OF INSULIN (HCC): ICD-10-CM

## 2023-11-16 RX ORDER — SITAGLIPTIN AND METFORMIN HYDROCHLORIDE 1000; 50 MG/1; MG/1
1 TABLET, FILM COATED ORAL 2 TIMES DAILY WITH MEALS
Qty: 180 TABLET | Refills: 3 | Status: SHIPPED | OUTPATIENT
Start: 2023-11-16

## 2023-11-16 RX ORDER — INSULIN DEGLUDEC INJECTION 100 U/ML
INJECTION, SOLUTION SUBCUTANEOUS
Qty: 15 ML | Refills: 1 | Status: SHIPPED | OUTPATIENT
Start: 2023-11-16

## 2023-11-25 PROBLEM — Z12.11 SCREENING FOR COLON CANCER: Status: RESOLVED | Noted: 2023-09-26 | Resolved: 2023-11-25

## 2023-12-12 ENCOUNTER — TELEPHONE (OUTPATIENT)
Age: 63
End: 2023-12-12

## 2023-12-13 ENCOUNTER — OFFICE VISIT (OUTPATIENT)
Age: 63
End: 2023-12-13
Payer: MEDICARE

## 2023-12-13 ENCOUNTER — NURSE TRIAGE (OUTPATIENT)
Age: 63
End: 2023-12-13

## 2023-12-13 VITALS
HEIGHT: 68 IN | OXYGEN SATURATION: 98 % | HEART RATE: 81 BPM | TEMPERATURE: 98.6 F | RESPIRATION RATE: 17 BRPM | SYSTOLIC BLOOD PRESSURE: 136 MMHG | DIASTOLIC BLOOD PRESSURE: 80 MMHG | BODY MASS INDEX: 27.06 KG/M2

## 2023-12-13 DIAGNOSIS — E61.1 IRON DEFICIENCY: ICD-10-CM

## 2023-12-13 DIAGNOSIS — K59.09 CHRONIC CONSTIPATION: ICD-10-CM

## 2023-12-13 DIAGNOSIS — R11.0 NAUSEA: Primary | ICD-10-CM

## 2023-12-13 DIAGNOSIS — K21.9 GASTROESOPHAGEAL REFLUX DISEASE, UNSPECIFIED WHETHER ESOPHAGITIS PRESENT: ICD-10-CM

## 2023-12-13 DIAGNOSIS — Z12.11 SCREENING FOR COLON CANCER: ICD-10-CM

## 2023-12-13 DIAGNOSIS — K22.70 BARRETT'S ESOPHAGUS WITHOUT DYSPLASIA: ICD-10-CM

## 2023-12-13 PROCEDURE — 99214 OFFICE O/P EST MOD 30 MIN: CPT | Performed by: NURSE PRACTITIONER

## 2023-12-13 RX ORDER — POLYETHYLENE GLYCOL 3350 17 G/17G
17 POWDER, FOR SOLUTION ORAL 2 TIMES DAILY
Qty: 578 G | Refills: 5 | Status: SHIPPED | OUTPATIENT
Start: 2023-12-13 | End: 2023-12-13 | Stop reason: SDUPTHER

## 2023-12-13 RX ORDER — POLYETHYLENE GLYCOL 3350 17 G/17G
POWDER, FOR SOLUTION ORAL
Qty: 578 G | Refills: 5 | Status: SHIPPED | OUTPATIENT
Start: 2023-12-13

## 2023-12-13 RX ORDER — FAMOTIDINE 40 MG/1
40 TABLET, FILM COATED ORAL
Qty: 30 TABLET | Refills: 5 | Status: SHIPPED | OUTPATIENT
Start: 2023-12-13

## 2023-12-13 RX ORDER — PROCHLORPERAZINE MALEATE 5 MG/1
5 TABLET ORAL EVERY 6 HOURS PRN
Qty: 45 TABLET | Refills: 5 | Status: SHIPPED | OUTPATIENT
Start: 2023-12-13

## 2023-12-13 RX ORDER — FERROUS SULFATE 324(65)MG
324 TABLET, DELAYED RELEASE (ENTERIC COATED) ORAL
Qty: 30 TABLET | Refills: 5 | Status: SHIPPED | OUTPATIENT
Start: 2023-12-13

## 2023-12-13 RX ORDER — PANTOPRAZOLE SODIUM 40 MG/1
TABLET, DELAYED RELEASE ORAL
Qty: 60 TABLET | Refills: 5 | Status: SHIPPED | OUTPATIENT
Start: 2023-12-13

## 2023-12-13 NOTE — TELEPHONE ENCOUNTER
Please fax new script to [phone]      Reason for Disposition   Pharmacy calling with prescription question and triager answers question    Answer Assessment - Initial Assessment Questions  1. NAME of MEDICATION:       Sweetie, pharmacist from [facility] calling to clarify prescription instructions, Glycolax ordered as   Take 17 g by mouth 2 (two) times a day pt only taking 1 x daily and visit note states  Re-start mirlax 1/2 capful daily and increase to 1 full capful if not had a BM in 2 days.  Nurse unable to verify correct dose of Glycolax, pharmacy asking to re-send script with correct dosing instructions, thank you    Protocols used: Medication Question Call-ADULT-[place]

## 2023-12-13 NOTE — PROGRESS NOTES
[facility] - Outpatient Follow-up Note  Dominga Baker 70 y.o. male MRN: [id number]  Encounter: [id number]          ASSESSMENT AND PLAN:    The patient presents today for follow-up office visit regarding his chronic nausea, GERD symptoms, constipation, and iron deficiency with a history of Norman's esophagus. Exam: Abdomen is soft, nondistended, with mild tenderness noted upon exam in the epigastric region without any significant guarding or rebound tenderness noted, with hypoactive bowel sounds x 4. No edema noted of the b/l lower extremities upon exam today. Skin is non-icteric. 1. Nausea  2. Gastroesophageal reflux disease, unspecified whether esophagitis present  3. Norman's esophagus without dysplasia  While the patient and his friend were in the office today, I discussed with the patient that at this point in time since his overall reflux and nausea symptoms are improved and more manageable he should continue with the Protonix as prescribed and continue to use the Compazine and famotidine as needed, as prescribed. Encouraged the patient to try to avoid trigger foods is much as possible. - pantoprazole (PROTONIX) 40 mg tablet; Take 1 PO BID 30 mins prior to a meal.  Dispense: 60 tablet; Refill: 5  - prochlorperazine (COMPAZINE) 5 mg tablet; Take 1 tablet (5 mg total) by mouth every 6 (six) hours as needed for nausea or vomiting  Dispense: 45 tablet; Refill: 5  - famotidine (PEPCID) 40 MG tablet; Take 1 tablet (40 mg total) by mouth daily at bedtime as needed for heartburn  Dispense: 30 tablet; Refill: 5    4.  Chronic constipation  I discussed with the patient that I feel would be in his best interest to restart the MiraLAX daily, especially since it was more helpful and kept his bowels more regular, but if it makes him feel more comfortable he can just take a half a capful a day and then if he starts to have more constipation or slows down he can increase it to a full cap a day until he goes back to normal bowel movement regimen and then back to the half a capful daily. The patient was agreeable and verbalized an understanding. Encouraged the patient to continue to drink as close to 64 ounces of water daily. 5. Iron deficiency  Stable    The patient is to continue his daily iron supplementation for now and proceed with repeat blood work in the new year and then once we have the repeat blood work results we will contact him and decide whether or not to start taking the oral iron every other day and then eventually taper off of it and see how he does. The patient was agreeable and verbalized an understanding.    - ferrous sulfate 324 (65 Fe) mg; Take 1 tablet (324 mg total) by mouth daily before breakfast  Dispense: 30 tablet; Refill: 5  - Iron Panel (Includes Ferritin, Iron Sat%, Iron, and TIBC); Future  - CBC and differential; Future  - Comprehensive metabolic panel; Future    6. Screening for colon cancer  At this point time as the patient would like to continue to defer the colonoscopy, he was at least agreeable to proceed with the Cologuard screening as some type of colon cancer screening, with a full understanding that the Cologuard screening would be positive we would then recommend proceeding with a colonoscopy. The patient was agreeable and verbalized an understanding.    - Cologuard     The patient will schedule a follow up office visit in 6 months, but understands to call or contact our office if there are any issues or concerns in the mean time. ______________________________________________________________________    SUBJECTIVE: Patient is a 70 y.o. male who was previously seen in our office on 9/25/23 and presents today for follow-up office visit regarding his chronic nausea, GERD symptoms, constipation, and iron deficiency with a history of Norman's esophagus.   The patient reports that since last office visit overall things have been relatively stable, although he does seem to still have issues with some intermittent nausea, especially when he is constipated. The patient reports that he does continue to use the Compazine as needed for nausea and does feel that the increase in the Protonix has been helpful along with being able to use the famotidine for breakthrough reflux symptoms. The patient reports that he still is having some intermittent issues with constipation deftly notices that when he is constipated his nausea is worse, but is not taking the MiraLAX daily but more as needed because he just does not like to take it every day out of fear of having diarrhea even though he was not having diarrhea when he was taking it regularly and he deftly was noticing better consistency with his bowel regimen and when he was taking it more consistently. The patient was recently hospitalized since his last office visit for cellulitis with an eventual toe amputation on his left foot. Meds: Famotidine 40 mg as needed for breakthrough reflux, Protonix 40 mg twice daily, Phenergan as needed for nausea and vomiting, and MiraLAX as needed for constipation. Imaging: (None):     Endoscopy History:  EGD: (8/15/23): C1 M2 Norman's esophagus. Path: Norman's esophagus. Recommendation is to proceed with a repeat EGD in 3 years for surveillance. COLONOSCOPY: (None):       DUE: NOW    REVIEW OF SYSTEMS IS OTHERWISE NEGATIVE.       Historical Information   Past Medical History:   Diagnosis Date    Arthritis     Diabetes mellitus ([address])     DVT (deep venous thrombosis) ([facility])     GERD (gastroesophageal reflux disease)     History of COVID-19 2020    severe disease, intubated/ICU x 1 month    History of drug abuse ([address])     heroin abuse, clean since 2003    Hypercholesteremia     Hypertension     Ischemia of right lower extremity with suspected rhabdomyolysis 02/06/2021    Left Hydropneumothorax 02/10/2021    Methadone use     Pneumonia due to COVID-19 virus 01/11/2021    Subacute osteomyelitis of right femur ([address]) 09/13/2021    Vascular disorder of lower extremity      Past Surgical History:   Procedure Laterality Date    AMPUTATION      AMPUTATION ABOVE KNEE (AKA) Right 01/14/2021    Procedure: AMPUTATION ABOVE KNEE (AKA); Surgeon: Lesli Denton MD;  Location: [facility];  Service: Vascular    AMPUTATION ABOVE KNEE (AKA) Right 01/18/2021    Procedure: AMPUTATION ABOVE KNEE (AKA) FORMALIZATION,  R AKA wound washout, wound closure;  Surgeon: Lesli Denton MD;  Location: [facility];  Service: Vascular    ARTERIOGRAM Right 01/13/2021    Procedure: ARTERIOGRAM;  Surgeon: Yesenia Pereira DO;  [facility]: [facility];  Service: Vascular    BRONCHOSCOPY      IR CHEST TUBE PLACEMENT  02/10/2021    IR LOWER EXTREMITY ANGIOGRAM  01/14/2021    IR LOWER EXTREMITY ANGIOGRAM  11/28/2022    LA AMPUTATION METATARSAL W/TOE SINGLE Left 10/9/2023    Procedure: AMPUTATION TOE, 2nd;  Surgeon: Lizbeth Trevizo DPM;  Location: [facility];  Service: Podiatry    LA AMPUTATION TOE METATARSOPHALANGEAL JOINT Left 01/20/2023    Procedure: AMPUTATION TOE- hallux;   Surgeon: Lizbeth Trevizo DPM;  Location: [facility];  Service: Podiatry    LA Theirma Otto 3RD+ ORD SLCTV ABDL PEL[facility] Left 11/28/2022    Procedure: LEG AGRAM W/ INTERVENTION;  Surgeon: Kishor Mcdonald MD;  Location: [facility];  Service: Vascular    THROMBECTOMY W/ EMBOLECTOMY Right 01/13/2021    Procedure: EMBOLECTOMY/THROMBECTOMY LOWER EXTREMITY;  Surgeon: Yesenia Pereira DO;  [facility]: [facility];  Service: Vascular    VASCULAR SURGERY       Social History   Social History     Substance and Sexual Activity   Alcohol Use Not Currently     Social History     Substance and Sexual Activity   Drug Use Not Currently    Types: Heroin    Comment: clean since 2003, now on methadone     Social History     Tobacco Use   Smoking Status Every Day    Current packs/day: 1.00    Types: Cigarettes Smokeless Tobacco Never   Tobacco Comments    Has patches     Family History   Problem Relation Age of Onset    Diabetes Mother     Hypertension Father     Leukemia Father     Acute lymphoblastic leukemia Father     Cancer Sister        Meds/Allergies       Current Outpatient Medications:     acetaminophen (TYLENOL) 325 mg tablet    albuterol (PROVENTIL HFA,VENTOLIN HFA) 90 mcg/act inhaler    apixaban (Eliquis) 5 mg    aspirin 81 mg chewable tablet    atorvastatin (LIPITOR) 20 mg tablet    Blood Glucose Monitoring Suppl (OneTouch Verio) w/Device KIT    cholecalciferol (VITAMIN D3) 1,000 units tablet    Continuous Blood Gluc  (FreeStyle Ke 2 [place]) JOSEPH    Continuous Blood Gluc Sensor (FreeStyle Ke 2 Sensor) MISC    Diclofenac Sodium (VOLTAREN) 1 %    DULoxetine (CYMBALTA) 60 mg delayed release capsule    Empagliflozin (Jardiance) 25 MG TABS    famotidine (PEPCID) 40 MG tablet    ferrous sulfate 324 (65 Fe) mg    fluticasone (FLONASE) 50 mcg/act nasal spray    glucose blood test strip    insulin degludec Brunetta Brood FlexTouch) 100 units/mL injection pen    insulin lispro (HumaLOG KwikPen) 100 units/mL injection pen    Insulin Pen Needle 31G X 5 MM MISC    Insulin Pen Needle 31G X 5 MM MISC    Insulin Syringe-Needle U-100 (BD Veo Insulin Syringe U/F) 31G X 15/64" 0.5 ML MISC    levocetirizine (XYZAL) 5 MG tablet    lidocaine (LMX) 4 % cream    lisinopril (ZESTRIL) 10 mg tablet    magnesium oxide (MAG-OX) 400 mg    MAGnesium-Oxide 400 (240 Mg) MG TABS    methadone (DOLOPHINE) 10 mg tablet    multivitamin-minerals (CENTRUM ADULTS) tablet    naloxone (NARCAN) 4 mg/0.1 mL nasal spray    nicotine (NICODERM CQ) 21 mg/24 hr TD 24 hr patch    pantoprazole (PROTONIX) 40 mg tablet    polyethylene glycol (GLYCOLAX) 17 GM/SCOOP powder    prochlorperazine (COMPAZINE) 5 mg tablet    silver sulfadiazine (SILVADENE,SSD) 1 % cream    sitaGLIPtin-metFORMIN (Janumet)  MG per tablet    traZODone (DESYREL) 50 mg tablet    Allergies   Allergen Reactions    Varenicline Rash     Bad dreams           Objective     There were no vitals taken for this visit. There is no height or weight on file to calculate BMI. PHYSICAL EXAM:      General Appearance:   Alert, cooperative, no distress   HEENT:   Normocephalic, atraumatic, anicteric. Neck:  Supple, symmetrical, trachea midline   Lungs:   Clear to auscultation bilaterally; no rales, rhonchi or wheezing; respirations unlabored    Heart[de-identified]   Regular rate and rhythm; no murmur, rub, or gallop. Abdomen:   Soft, non-tender, non-distended; normal bowel sounds; no masses, no organomegaly    Genitalia:   Deferred    Rectal:   Deferred    Extremities:  No cyanosis, clubbing or edema    Pulses:  2+ and symmetric    Skin:  No jaundice, rashes, or lesions    Lymph nodes:  No palpable cervical lymphadenopathy        Lab Results:   No visits with results within 1 Day(s) from this visit. Latest known visit with results is:   Admission on 10/09/2023, Discharged on 10/09/2023   Component Date Value    POC Glucose 10/09/2023 211 (H)     Case Report 10/09/2023                      Value:Surgical Pathology Report                         Case: [id number]                                   Authorizing Provider:  Alex Sheppard, Collected:           10/09/2023 1420                                     DPM                                                                          Ordering Location:     [address] [place] [address] Received:            10/09/2023 1452                                     Operating Room                                                               Pathologist:           Mallory Davis DO                                                           Specimen:    Toe, Left, left second toe                                                                 Final Diagnosis 10/09/2023                      Value: This result contains rich text formatting which cannot be displayed here. Additional Information 10/09/2023                      Value: This result contains rich text formatting which cannot be displayed here. Gross Description 10/09/2023                      Value: This result contains rich text formatting which cannot be displayed here. POC Glucose 10/09/2023 208 (H)          Radiology Results:   No results found.

## 2023-12-13 NOTE — PATIENT INSTRUCTIONS
Re-start mirlax 1/2 capful daily and increase to 1 full capful if not had a BM in 2 days. Continue to drink at least 64 oz of water daily. Continue Protonix, Compazine, and Famotidine as prescribed. Continue to avoid trigger foods. Continue Iron pills daily for now. Proceed with repeat blood work after the new year. Proceed with cologuard screening before next OV. Continue to monitor for red flag symptoms. Schedule a f/u OV in 6 months. While the patient was in the office today we did review GI red flag symptoms, including, but not limited to: chronic nausea, vomiting, diarrhea, chills, fever, and unintentional weight loss and should call or contact our office with any changes or concerns. I reviewed with the patient that if they notice any blood while vomiting or in their stool they should contact or office or go to the nearest emergency room for immediate evaluation. The patient was agreeable and verbalized an understanding.

## 2024-01-03 ENCOUNTER — NURSE TRIAGE (OUTPATIENT)
Age: 64
End: 2024-01-03

## 2024-01-03 DIAGNOSIS — F17.200 TOBACCO DEPENDENCE: ICD-10-CM

## 2024-01-03 DIAGNOSIS — K22.70 BARRETT'S ESOPHAGUS WITHOUT DYSPLASIA: ICD-10-CM

## 2024-01-03 DIAGNOSIS — E61.1 IRON DEFICIENCY: ICD-10-CM

## 2024-01-03 DIAGNOSIS — S78.111A ABOVE-KNEE AMPUTATION OF RIGHT LOWER EXTREMITY (HCC): ICD-10-CM

## 2024-01-03 DIAGNOSIS — K21.9 GASTROESOPHAGEAL REFLUX DISEASE, UNSPECIFIED WHETHER ESOPHAGITIS PRESENT: ICD-10-CM

## 2024-01-03 DIAGNOSIS — R11.0 NAUSEA: ICD-10-CM

## 2024-01-03 RX ORDER — LANOLIN ALCOHOL/MO/W.PET/CERES
400 CREAM (GRAM) TOPICAL DAILY
Qty: 30 TABLET | Refills: 1 | OUTPATIENT
Start: 2024-01-03

## 2024-01-03 NOTE — TELEPHONE ENCOUNTER
"Reason for Disposition   [1] Prescription refill request for ESSENTIAL medicine (i.e., likelihood of harm to patient if not taken) AND [2] triager unable to refill per department policy    Answer Assessment - Initial Assessment Questions  1. DRUG NAME: \"What medicine do you need to have refilled?\"         Pt. Requesting to have magnesium oxide 400 mg daily  re-filled, medication ordered, please sign off    Protocols used: Medication Refill and Renewal Call-ADULT-    "

## 2024-01-04 ENCOUNTER — TELEPHONE (OUTPATIENT)
Dept: FAMILY MEDICINE CLINIC | Facility: CLINIC | Age: 64
End: 2024-01-04

## 2024-01-04 RX ORDER — NICOTINE 21 MG/24HR
1 PATCH, TRANSDERMAL 24 HOURS TRANSDERMAL EVERY 24 HOURS
Qty: 28 PATCH | Refills: 0 | OUTPATIENT
Start: 2024-01-04

## 2024-01-04 NOTE — TELEPHONE ENCOUNTER
Patients nephew called stating Ben tested positive for covid and is having really bad body aches, sore throat and a runny nose. They want to know what over the counter medications you think would be best for him? He knows there is not much you can send in for him but he wants to know which medications would help him the best for his body aches?

## 2024-01-05 DIAGNOSIS — F17.200 TOBACCO DEPENDENCE: Primary | ICD-10-CM

## 2024-01-05 RX ORDER — NICOTINE 21 MG/24HR
1 PATCH, TRANSDERMAL 24 HOURS TRANSDERMAL EVERY 24 HOURS
Qty: 28 PATCH | Refills: 0 | Status: SHIPPED | OUTPATIENT
Start: 2024-01-05

## 2024-01-11 DIAGNOSIS — E11.42 TYPE 2 DIABETES MELLITUS WITH DIABETIC POLYNEUROPATHY, WITH LONG-TERM CURRENT USE OF INSULIN (HCC): ICD-10-CM

## 2024-01-11 DIAGNOSIS — Z79.4 TYPE 2 DIABETES MELLITUS WITH DIABETIC POLYNEUROPATHY, WITH LONG-TERM CURRENT USE OF INSULIN (HCC): ICD-10-CM

## 2024-01-11 DIAGNOSIS — E11.42 TYPE 2 DIABETES MELLITUS WITH DIABETIC POLYNEUROPATHY, WITHOUT LONG-TERM CURRENT USE OF INSULIN (HCC): ICD-10-CM

## 2024-01-11 RX ORDER — INSULIN DEGLUDEC INJECTION 100 U/ML
INJECTION, SOLUTION SUBCUTANEOUS
Qty: 15 ML | Refills: 1 | Status: SHIPPED | OUTPATIENT
Start: 2024-01-11

## 2024-01-11 RX ORDER — INSULIN LISPRO 100 [IU]/ML
INJECTION, SOLUTION INTRAVENOUS; SUBCUTANEOUS
Qty: 15 ML | Refills: 3 | Status: SHIPPED | OUTPATIENT
Start: 2024-01-11

## 2024-02-05 ENCOUNTER — NURSE TRIAGE (OUTPATIENT)
Age: 64
End: 2024-02-05

## 2024-02-05 DIAGNOSIS — E11.42 TYPE 2 DIABETES MELLITUS WITH DIABETIC POLYNEUROPATHY, WITH LONG-TERM CURRENT USE OF INSULIN (HCC): ICD-10-CM

## 2024-02-05 DIAGNOSIS — G89.4 CHRONIC PAIN SYNDROME: ICD-10-CM

## 2024-02-05 DIAGNOSIS — Z79.4 TYPE 2 DIABETES MELLITUS WITH DIABETIC POLYNEUROPATHY, WITH LONG-TERM CURRENT USE OF INSULIN (HCC): ICD-10-CM

## 2024-02-05 DIAGNOSIS — S78.111A ABOVE-KNEE AMPUTATION OF RIGHT LOWER EXTREMITY (HCC): ICD-10-CM

## 2024-02-05 RX ORDER — MAGNESIUM OXIDE 400 MG/1
400 TABLET ORAL DAILY
Qty: 90 TABLET | Refills: 3 | Status: SHIPPED | OUTPATIENT
Start: 2024-02-05

## 2024-02-05 NOTE — TELEPHONE ENCOUNTER
"Pt calling in asking for a refill of mag oxide 400 mg daily. He tried to go through PCP but it was originally rx by GI. Please advise if this can be refilled.   Reason for Disposition   Prescription refill request for NON-ESSENTIAL medicine (i.e., no harm to patient if med not taken) and triager unable to refill per department policy    Answer Assessment - Initial Assessment Questions  1. NAME of MEDICATION: \"What medicine are you calling about?\"      Mag oxide   2. QUESTION: \"What is your question?\" (e.g., medication refill, side effect)  Refill    Protocols used: Medication Question Call-ADULT-[place]    "

## 2024-02-06 RX ORDER — DULOXETIN HYDROCHLORIDE 60 MG/1
60 CAPSULE, DELAYED RELEASE ORAL
Qty: 90 CAPSULE | Refills: 1 | Status: SHIPPED | OUTPATIENT
Start: 2024-02-06

## 2024-02-11 PROBLEM — Z12.11 SCREENING FOR COLON CANCER: Status: RESOLVED | Noted: 2023-12-13 | Resolved: 2024-02-11

## 2024-02-15 ENCOUNTER — DOCUMENTATION (OUTPATIENT)
Dept: ENDOCRINOLOGY | Facility: CLINIC | Age: 64
End: 2024-02-15

## 2024-03-12 ENCOUNTER — TELEPHONE (OUTPATIENT)
Dept: ENDOCRINOLOGY | Facility: CLINIC | Age: 64
End: 2024-03-12

## 2024-03-12 DIAGNOSIS — Z79.4 TYPE 2 DIABETES MELLITUS WITH DIABETIC POLYNEUROPATHY, WITH LONG-TERM CURRENT USE OF INSULIN (HCC): ICD-10-CM

## 2024-03-12 DIAGNOSIS — E11.42 TYPE 2 DIABETES MELLITUS WITH DIABETIC POLYNEUROPATHY, WITH LONG-TERM CURRENT USE OF INSULIN (HCC): ICD-10-CM

## 2024-03-12 RX ORDER — INSULIN DEGLUDEC INJECTION 100 U/ML
INJECTION, SOLUTION SUBCUTANEOUS
Qty: 15 ML | Refills: 1 | Status: SHIPPED | OUTPATIENT
Start: 2024-03-12

## 2024-03-12 NOTE — TELEPHONE ENCOUNTER
Received letter from patients insurance that their Insulin Degludec Pen is not covered. Covered medications are Tresiba and Toujeo Solostar. Please advise

## 2024-03-12 NOTE — TELEPHONE ENCOUNTER
"I believe it either has to be sent in as \"dispense as written\" so the brand Tresiba is prescribed OR they do not cover the Tresiba pen. On the letter the insurance sent it just says \"Tresiba\"   "

## 2024-03-18 ENCOUNTER — OFFICE VISIT (OUTPATIENT)
Dept: ENDOCRINOLOGY | Facility: CLINIC | Age: 64
End: 2024-03-18
Payer: MEDICARE

## 2024-03-18 VITALS
OXYGEN SATURATION: 97 % | DIASTOLIC BLOOD PRESSURE: 84 MMHG | HEART RATE: 85 BPM | HEIGHT: 68 IN | BODY MASS INDEX: 27.06 KG/M2 | SYSTOLIC BLOOD PRESSURE: 118 MMHG

## 2024-03-18 DIAGNOSIS — E11.65 TYPE 2 DIABETES MELLITUS WITH HYPERGLYCEMIA, WITH LONG-TERM CURRENT USE OF INSULIN (HCC): Primary | Chronic | ICD-10-CM

## 2024-03-18 DIAGNOSIS — I10 ESSENTIAL HYPERTENSION: Chronic | ICD-10-CM

## 2024-03-18 DIAGNOSIS — E11.42 TYPE 2 DIABETES MELLITUS WITH DIABETIC POLYNEUROPATHY, WITH LONG-TERM CURRENT USE OF INSULIN (HCC): ICD-10-CM

## 2024-03-18 DIAGNOSIS — Z79.4 TYPE 2 DIABETES MELLITUS WITH DIABETIC POLYNEUROPATHY, WITH LONG-TERM CURRENT USE OF INSULIN (HCC): ICD-10-CM

## 2024-03-18 DIAGNOSIS — E78.2 MIXED HYPERLIPIDEMIA: Chronic | ICD-10-CM

## 2024-03-18 DIAGNOSIS — Z79.4 TYPE 2 DIABETES MELLITUS WITH HYPERGLYCEMIA, WITH LONG-TERM CURRENT USE OF INSULIN (HCC): Primary | Chronic | ICD-10-CM

## 2024-03-18 LAB — SL AMB POCT HEMOGLOBIN AIC: 8.4 (ref ?–6.5)

## 2024-03-18 PROCEDURE — 99214 OFFICE O/P EST MOD 30 MIN: CPT | Performed by: PHYSICIAN ASSISTANT

## 2024-03-18 PROCEDURE — 83036 HEMOGLOBIN GLYCOSYLATED A1C: CPT | Performed by: PHYSICIAN ASSISTANT

## 2024-03-18 PROCEDURE — 95251 CONT GLUC MNTR ANALYSIS I&R: CPT | Performed by: PHYSICIAN ASSISTANT

## 2024-03-18 RX ORDER — INSULIN LISPRO 100 [IU]/ML
INJECTION, SOLUTION INTRAVENOUS; SUBCUTANEOUS
Qty: 15 ML | Refills: 3 | Status: SHIPPED | OUTPATIENT
Start: 2024-03-18

## 2024-03-18 NOTE — PROGRESS NOTES
Patient Progress Note      CC: DM   He is here with his nephew     Referring Provider  Rocael Ludwig  [address] [place] [address] Rd.  [place] [id number]     History of Present Illness:   Ben Alvarez is a 64 y.o. male with a history of type 2 diabetes with long term use of insulin. Diabetes course has been stable. Complications of DM: neuropathy, right AKA, gastroparesis. Denies any issues with his current regimen. Home glucose monitoring: are performed regularly, 4 times a day.      Average glucose 210 mg/dL  In target range 32%, above range 68%, below range 0%     Current regimen: Tresiba 30 units QHS, Humalog 5 units BID plus scale (1:50 over 200) (he using 7 units BID plus scale), Jardiance 25 mg daily, Janumet  mg BID  compliant most of the time, denies any side effects from medications.  Injects in: abdomen. Rotates sites: Yes  Hypoglycemic episodes: No, rare  H/o of hypoglycemia causing hospitalization or Intervention such as glucagon injection or ambulance call : No  Hypoglycemia symptoms: jitteriness  Treatment of hypoglycemia: juice, glucose tablets     Medic alert tag: recommended: Yes     Diabetes education: No  Diet: 3 meals per day, 1-2 snacks per day. Timing of meals is predictable.   Diabetic diet compliance: noncompliant some of the time; eats small portions  Activity: Daily activity is predictable: Yes. No routine exercise.      Ophthamology: August 2022. Goes every 6 months. Dr. Mansfield  Podiatry: foot exam UTD, Oct 2023     Has hypertension: on ACE inhibitor/ARB, compliant most of the time  Has hyperlipidemia: on statin   Thyroid disorders: No  History of pancreatitis: No      Patient Active Problem List   Diagnosis    Chronic hepatitis C without hepatic coma (HCC)    Essential hypertension    Hyperlipidemia associated with type 2 diabetes mellitus     Methadone maintenance therapy patient (HCC)    Type 2 diabetes mellitus with diabetic polyneuropathy (HCC)    Diabetic ulcer of toe of  left foot associated with type 2 diabetes mellitus, with bone involvement without evidence of necrosis (HCC)    Bilateral lower extremity edema    Positive depression screening    Mononeuropathy, unspecified    Mixed hyperlipidemia    Proteinuria    Vitamin D deficiency    Nausea    Type 2 diabetes mellitus, with long-term current use of insulin (HCC)    Acute respiratory failure with hypoxia (HCC)    Hematuria    Aortic thrombus (HCC)    Prolonged Q-T interval on ECG    Empyema lung (HCC)    S/P AKA (above knee amputation), right (HCC)    Cervical radiculopathy    Chronic bilateral low back pain    Above-knee amputation of right lower extremity (HCC)    Moderate protein-calorie malnutrition (HCC)    Chronic pain syndrome    Chronic hyponatremia    Depression    History of COVID-19    Pulmonary fibrosis (HCC)    Foot drop, left    PAD (peripheral artery disease) (HCC)    Diabetic neuropathy (HCC)    Ulcer of left foot, limited to breakdown of skin (HCC)    Pressure ulcer of sacral region, unstageable (HCC)    Heart failure, unspecified HF chronicity, unspecified heart failure type (HCC)    Unspecified adrenocortical insufficiency (HCC)    Hammer toe of left foot    Skin ulcer of toe of left foot, limited to breakdown of skin (HCC)    Skin ulcer of toe of left foot with fat layer exposed (HCC)    Gastroesophageal reflux disease    Iron deficiency    Osteomyelitis of toe of left foot (HCC)    History of amputation of left great toe (HCC)    CVA, old, hemiparesis (HCC)    Chronic constipation    Norman's esophagus without dysplasia      Past Medical History:   Diagnosis Date    Arthritis     Diabetes mellitus (HCC)     DVT (deep venous thrombosis) (HCC)     GERD (gastroesophageal reflux disease)     History of COVID-19 2020    severe disease, intubated/ICU x 1 month    History of drug abuse (HCC)     heroin abuse, clean since 2003    Hypercholesteremia     Hypertension     Ischemia of right lower extremity with  suspected rhabdomyolysis 02/06/2021    Left Hydropneumothorax 02/10/2021    Methadone use     Pneumonia due to COVID-19 virus 01/11/2021    Subacute osteomyelitis of right femur (HCC) 09/13/2021    Vascular disorder of lower extremity       Past Surgical History:   Procedure Laterality Date    AMPUTATION      AMPUTATION ABOVE KNEE (AKA) Right 01/14/2021    Procedure: AMPUTATION ABOVE KNEE (AKA);  Surgeon: Jaylyn Sawyer MD;  [facility];  Service: Vascular    AMPUTATION ABOVE KNEE (AKA) Right 01/18/2021    Procedure: AMPUTATION ABOVE KNEE (AKA) FORMALIZATION,  R AKA wound washout, wound closure;  Surgeon: Jaylyn Sawyer MD;  [facility];  Service: Vascular    ARTERIOGRAM Right 01/13/2021    Procedure: ARTERIOGRAM;  Surgeon: Andres Galicia DO;  [facility];  Service: Vascular    BRONCHOSCOPY      IR CHEST TUBE PLACEMENT  02/10/2021    IR LOWER EXTREMITY ANGIOGRAM  01/14/2021    IR LOWER EXTREMITY ANGIOGRAM  11/28/2022    FL AMPUTATION METATARSAL W/TOE SINGLE Left 10/9/2023    Procedure: AMPUTATION TOE, 2nd;  Surgeon: Anthony Ta DPM;  Location: [facility];  Service: Podiatry    FL AMPUTATION TOE METATARSOPHALANGEAL JOINT Left 01/20/2023    Procedure: AMPUTATION TOE- hallux;  Surgeon: Anthony Ta DPM;  Location: [facility];  Service: Podiatry    FL SLCTV CATHJ 3RD+ ORD SLCTV ABDL PEL/LXTR BRNCH Left 11/28/2022    Procedure: LEG AGRAM W/ INTERVENTION;  Surgeon: Jaylyn Sawyer MD;  Location: [facility];  Service: Vascular    THROMBECTOMY W/ EMBOLECTOMY Right 01/13/2021    Procedure: EMBOLECTOMY/THROMBECTOMY LOWER EXTREMITY;  Surgeon: Andres Galicia DO;  Location[facility];  Service: Vascular    VASCULAR SURGERY        Family History   Problem Relation Age of Onset    Diabetes Mother     Hypertension Father     Leukemia Father     Acute lymphoblastic leukemia Father     Cancer Sister      Social History     Tobacco Use    Smoking status: Every Day      Current packs/day: 1.00     Types: Cigarettes    Smokeless tobacco: Never    Tobacco comments:     Has patches   Substance Use Topics    Alcohol use: Not Currently     Allergies   Allergen Reactions    Varenicline Rash     Bad dreams         Current Outpatient Medications:     acetaminophen (TYLENOL) 325 mg tablet, Take 3 tablets (975 mg total) by mouth every 8 (eight) hours, Disp: , Rfl: 0    albuterol (PROVENTIL HFA,VENTOLIN HFA) 90 mcg/act inhaler, Inhale 2 puffs every 4 (four) hours as needed for wheezing, Disp: 18 g, Rfl: 3    apixaban (Eliquis) 5 mg, Take 1 tablet (5 mg total) by mouth 2 (two) times a day, Disp: 180 tablet, Rfl: 3    aspirin 81 mg chewable tablet, Chew 81 mg daily, Disp: , Rfl:     atorvastatin (LIPITOR) 20 mg tablet, Take 1 tablet (20 mg total) by mouth daily, Disp: 90 tablet, Rfl: 3    Blood Glucose Monitoring Suppl (OneTouch Verio) w/Device KIT, Use to test blood sugars 3 times daily, Disp: 1 kit, Rfl: 0    cholecalciferol (VITAMIN D3) 1,000 units tablet, Take 2 tablets (2,000 Units total) by mouth daily (Patient taking differently: Take 2,000 Units by mouth daily), Disp: 12 tablet, Rfl: 0    Continuous Blood Gluc  (FreeStyle Ke 2 [place]) JOSEPH, Use as directed, Disp: 1 each, Rfl: 0    Continuous Blood Gluc Sensor (FreeStyle Ke 2 Sensor) MISC, Change every 14 days, Disp: 2 each, Rfl: 2    Diclofenac Sodium (VOLTAREN) 1 %, Apply 4 g topically 4 (four) times a day as needed (as needed for pain), Disp: 50 g, Rfl: 1    DULoxetine (CYMBALTA) 60 mg delayed release capsule, Take 1 capsule (60 mg total) by mouth daily at bedtime, Disp: 90 capsule, Rfl: 1    Empagliflozin (Jardiance) 25 MG TABS, Take 1 tablet (25 mg total) by mouth every morning (Patient taking differently: Take 25 mg by mouth every morning NOT IN HOME), Disp: 90 tablet, Rfl: 3    famotidine (PEPCID) 40 MG tablet, Take 1 tablet (40 mg total) by mouth daily at bedtime as needed for heartburn, Disp: 30 tablet, Rfl:  "5    ferrous sulfate 324 (65 Fe) mg, Take 1 tablet (324 mg total) by mouth daily before breakfast, Disp: 30 tablet, Rfl: 5    fluticasone (FLONASE) 50 mcg/act nasal spray, 1 spray into each nostril daily, Disp: 16 g, Rfl: 2    glucose blood test strip, Use 1 each 3 (three) times daily after meals Use as instructed, Disp: 270 each, Rfl: 3    Insulin Pen Needle 31G X 5 MM MISC, 30 units sq 2X daily, Disp: 100 each, Rfl: 3    Insulin Syringe-Needle U-100 (BD Veo Insulin Syringe U/F) 31G X 15/64\" 0.5 ML MISC, Inject under the skin 3 (three) times a day Use as directed, Disp: 300 each, Rfl: 5    levocetirizine (XYZAL) 5 MG tablet, Take 1 tablet (5 mg total) by mouth every evening (Patient taking differently: Take 5 mg by mouth every evening NOT IN HOME), Disp: 90 tablet, Rfl: 3    lidocaine (LMX) 4 % cream, Apply topically 4 (four) times a day as needed for mild pain, Disp: 30 g, Rfl: 0    lisinopril (ZESTRIL) 10 mg tablet, Take 1 tablet (10 mg total) by mouth daily, Disp: 90 tablet, Rfl: 3    magnesium oxide (MAG-OX) 400 mg tablet, Take 1 tablet (400 mg total) by mouth daily, Disp: 90 tablet, Rfl: 3    methadone (DOLOPHINE) 10 mg tablet, Take 70 mg by mouth daily,, Disp: , Rfl:     multivitamin-minerals (CENTRUM ADULTS) tablet, Take 1 tablet by mouth daily, Disp: , Rfl: 0    naloxone (NARCAN) 4 mg/0.1 mL nasal spray, Administer 1 spray into a nostril. If no response after 2-3 minutes, give another dose in the other nostril using a new spray., Disp: 1 each, Rfl: 1    nicotine (NICODERM CQ) 21 mg/24 hr TD 24 hr patch, Place 1 patch on the skin over 24 hours every 24 hours, Disp: 28 patch, Rfl: 0    pantoprazole (PROTONIX) 40 mg tablet, Take 1 PO BID 30 mins prior to a meal., Disp: 60 tablet, Rfl: 5    polyethylene glycol (GLYCOLAX) 17 GM/SCOOP powder, Take 1/2-1 capful daily., Disp: 578 g, Rfl: 5    prochlorperazine (COMPAZINE) 5 mg tablet, Take 1 tablet (5 mg total) by mouth every 6 (six) hours as needed for nausea or " "vomiting, Disp: 45 tablet, Rfl: 5    silver sulfadiazine (SILVADENE,SSD) 1 % cream, Apply topically if needed (open wound), Disp: 400 g, Rfl: 3    sitaGLIPtin-metFORMIN (Janumet)  MG per tablet, Take 1 tablet by mouth 2 (two) times a day with meals, Disp: 180 tablet, Rfl: 3    traZODone (DESYREL) 50 mg tablet, Take 1 tablet (50 mg total) by mouth daily at bedtime as needed for sleep, Disp: 90 tablet, Rfl: 3    Tresiba FlexTouch 100 units/mL injection pen, Inject 30 units at dinner time daily, Disp: 15 mL, Rfl: 1    insulin lispro (HumaLOG KwikPen) 100 units/mL injection pen, Inject 9 units with brunch and 9 units with dinner +scale, Disp: 15 mL, Rfl: 3    Insulin Pen Needle 31G X 5 MM MISC, Use daily Pt to inject 4 X daily (Patient not taking: Reported on 11/13/2023), Disp: 100 each, Rfl: 5  Review of Systems   Constitutional:  Negative for activity change, appetite change, fatigue and unexpected weight change.   HENT:  Negative for trouble swallowing.    Eyes:  Negative for visual disturbance.   Respiratory:  Negative for shortness of breath.    Cardiovascular:  Negative for chest pain and palpitations.   Gastrointestinal:  Positive for constipation (uses a stool softener). Negative for diarrhea.   Endocrine: Negative for polydipsia and polyuria.   Musculoskeletal:  Positive for arthralgias.   Skin:  Negative for wound.   Neurological:  Positive for numbness.   Psychiatric/Behavioral: Negative.         Physical Exam:  Body mass index is 27.06 kg/m².  /84   Pulse 85   Ht 5' 8\" (1.727 m)   SpO2 97%   BMI 27.06 kg/m²    Wt Readings from Last 3 Encounters:   10/13/23 80.7 kg (178 lb)   10/09/23 80.7 kg (178 lb)   10/04/23 79.4 kg (175 lb)       Physical Exam  Vitals and nursing note reviewed.   Constitutional:       Appearance: He is well-developed.   HENT:      Head: Normocephalic.   Eyes:      General: No scleral icterus.     Pupils: Pupils are equal, round, and reactive to light.   Neck:      " "Thyroid: No thyromegaly.   Cardiovascular:      Rate and Rhythm: Normal rate and regular rhythm.      Pulses:           Radial pulses are 2+ on the right side and 2+ on the left side.      Heart sounds: No murmur heard.  Pulmonary:      Effort: Pulmonary effort is normal. No respiratory distress.      Breath sounds: Normal breath sounds. No wheezing.   Musculoskeletal:         General: Deformity (right AKA) present.      Cervical back: Neck supple.   Skin:     General: Skin is warm and dry.   Neurological:      Mental Status: He is alert.       Patient's shoes and socks were not removed.          Labs:   Lab Results   Component Value Date    HGBA1C 8.4 (A) 03/18/2024     Lab Results   Component Value Date    GLUCOSE 179 (H) 01/13/2021    CALCIUM 9.9 10/07/2023    K 4.8 10/07/2023    CO2 32 10/07/2023     10/07/2023    BUN 21 10/07/2023    CREATININE 0.99 10/07/2023     No results found for: \"MICROALBUR\", \"[id number]\"  eGFR   Date Value Ref Range Status   10/07/2023 79 ml/min/1.73sq m Final     No components found for: \"MALBCRER\"  Lab Results   Component Value Date    HDL 32 (L) 05/25/2022    TRIG 217 (H) 05/25/2022     Lab Results   Component Value Date    ALT 15 10/07/2023    AST 16 10/07/2023    GGT 44 01/27/2023    ALKPHOS 103 10/07/2023     Lab Results   Component Value Date    AEM9DCRYSVPH 2.160 03/04/2020    TSH 1.21 02/26/2021       Plan:    Diagnoses and all orders for this visit:    Type 2 diabetes mellitus with hyperglycemia, with long-term current use of insulin ([facility])  HGA1C 8.4%. Worsened.  Treatment regimen: increase Humalog to 9 units BID with meals plus scale. Continue other treatments.    Discussed intensive insulin regimen does increase risk for hypoglycemia. Episodes of hypoglycemia can lead to permanent disability and death.  Discussed risks/complications associated with uncontrolled diabetes.    Advised to adhere to diabetic diet, and recommended staying active/exercising routinely as " tolerated.  Keep carbohydrates consistent to limit blood glucose fluctuations.  Advised to call if blood sugars less than 70 mg/dl or over 300 mg/dl.   Check blood glucose 3+ times a day  Discussed symptoms and treatment of hypoglycemia.   Discussed use of CGM to collect additional blood glucose data to reveal trends and patterns that can be used to optimize treatment plan.   Recommended routine follow-up with podiatry and ophthalmology.   Download CGM in 2 weeks.    Ordered blood work to complete prior to next visit.  -     POCT hemoglobin A1c  -     Albumin / creatinine urine ratio; Future  -     Hemoglobin A1C; Future  -     Basic metabolic panel; Future  -     POCT hemoglobin A1c    Mixed hyperlipidemia  LDL previously 39  Continue statin therapy  Managed by PCP     Essential hypertension  Blood pressure well controlled, continue current treatment        Discussed with the patient diagnosis and treatment and all questions fully answered. He will call me if any problems arise.    Counseled patient on diagnostic results, prognosis, risk and benefit of treatment options, instruction for management, importance of treatment compliance, risk factor reduction and impressions.      Annika Patel PA-C

## 2024-03-18 NOTE — PATIENT INSTRUCTIONS
Hypoglycemia instructions   Ben Alvarez  3/18/2024  [id number]    Low Blood Sugar  INSULIN DOSAGE INSTRUCTIONS    Name: Ben Alvarez                        : 1960  MRN #: [id number]    Your Current Insulin  and dose is: Before Breakfast Before Lunch Before Evening Meal Bedtime     Humalog Insulin     9 units       9 units     Regular, Apidra, Humalog orNovolog Sliding Scale:   <80              151-200 +  + +    201-250 + 1 + +1 +   251-300 + 2 + +2 +   301-350 + 3 + +3 +   >350 + 4 + +4 +     Tresiba     30 units     Additional Instructions:   Please test your blood sugar:  _4_ Times per day.  X_ Before Breakfast                _ Alternate Testing  X_ Before Lunch                _ 2 Hours After  Meal  X_ Before Evening Meal               _ 3 a.m.  x_ Before Bedtime Snack     Target Blood sugar range _70_to _140__.  Call if your  blood sugar is less than _60_ or greater than _400__.    Today's Date: 3/18/2024   Hypoglycemia instructions   Ben Alvarez  3/18/2024  [id number]    Low Blood Sugar    Steps to treat low blood sugar.    1. Test blood sugar if you have symptoms of low blood sugar:   Low Blood Sugar Symptoms:  o Sweaty  o Dizzy  o Rapid heartbeat  o Shaky    o Bad mood  o Hungry      2. Treat blood sugar less than 70 with 15 grams of fast-acting carbohydrate:   Examples of 15 grams Fast-Acting Carbohydrate:  o 4 oz juice  o 4 oz regular soda  o 3-4 glucose tablets (chew)  o 3-4 hard candies (chew)              3.   Wait 15 minutes and test your blood sugar again           4. If blood sugar is less than 100, repeat steps 2-3.      5. When your blood sugar is 100 or more, eat a snack if it will be longer than one hour until your next meal. The snack should be 15 grams of carbohydrate and a protein:   Examples of snacks:  o ½ sandwich  o 6 crackers with cheese  o Piece of fruit with cheese or peanut butter  o 6 crackers with peanut butter    Steps to treat low blood sugar.    1. Test  blood sugar if you have symptoms of low blood sugar:   Low Blood Sugar Symptoms:  o Sweaty  o Dizzy  o Rapid heartbeat  o Shaky  o Bad mood  o Hungry      2. Treat blood sugar less than 70 with 15 grams of fast-acting carbohydrate:   Examples of 15 grams Fast-Acting Carbohydrate:  o 4 oz juice  o 4 oz regular soda  o 3-4 glucose tablets (chew)  o 3-4 hard candies (chew)          3.  Wait 15 minutes and test your blood sugar again     4. If blood sugar is less than 100, repeat steps 2-3.    5. When your blood sugar is 100 or more, eat a snack if it will be longer than one hour until your next meal. The snack should be 15 grams of carbohydrate and a protein:   Examples of snacks:  o ½ sandwich  o 6 crackers with cheese  o Piece of fruit with cheese or peanut butter  o 6 crackers with peanut butter

## 2024-03-28 ENCOUNTER — TELEPHONE (OUTPATIENT)
Dept: RHEUMATOLOGY | Facility: CLINIC | Age: 64
End: 2024-03-28

## 2024-03-28 NOTE — TELEPHONE ENCOUNTER
Left message for patient that Dr. Burnham will not be in office for appointment 3/29, please reschedule.

## 2024-04-10 ENCOUNTER — TELEPHONE (OUTPATIENT)
Dept: PODIATRY | Facility: CLINIC | Age: 64
End: 2024-04-10

## 2024-04-10 NOTE — TELEPHONE ENCOUNTER
Caller: Nolan Last    Doctor: Atnhony Ta, DPM    Reason for call: His left foot from the ankle down is swollen, especially the top of his foot.  Ben is an amputee. I scheduled him for 4/24/24 but his nephew is concerned and wanted to know if Dr. Ta feels he should be seen sooner.    Call back#: [phone]

## 2024-04-10 NOTE — TELEPHONE ENCOUNTER
I will notify his nephew who is c/o patient and advise to continue elevating and icing. To be evaluated at the ED with any urgency and to contact us with any future concern.

## 2024-04-10 NOTE — TELEPHONE ENCOUNTER
No open wounds, Icing, elevating, not warm to touch, no fever chills, no improvement; Patient does take Eliquis. I asked Nolan his Nephew to email a picture since they do not have My Chart I will upload to Media once received.

## 2024-04-16 ENCOUNTER — VBI (OUTPATIENT)
Dept: ADMINISTRATIVE | Facility: OTHER | Age: 64
End: 2024-04-16

## 2024-04-16 NOTE — TELEPHONE ENCOUNTER
Upon review of the In Basket request and the patient's chart, initial outreach has been made via telephone call to facility. The outcome of the telephone call was a fax request form to be sent to Office/Facility. Please see Contacts section for details.     Thank you  Tanisha Zarate MA

## 2024-04-16 NOTE — LETTER
Diabetic Eye Exam Form    Date Requested: 24  Patient: Ben Alvarez  Patient : 1960   Referring Provider: VERONICA Conley      DIABETIC Eye Exam Date _______________________________      Type of Exam MUST be documented for Diabetic Eye Exams. Please CHECK ONE.     Retinal Exam       Dilated Retinal Exam       OCT       Optomap-Iris Exam      Fundus Photography       Left Eye - Please check Retinopathy or No Retinopathy        Exam did show retinopathy    Exam did not show retinopathy       Right Eye - Please check Retinopathy or No Retinopathy       Exam did show retinopathy    Exam did not show retinopathy       Comments __________________________________________________________    Practice Providing Exam ______________________________________________    Exam Performed By (print name) _______________________________________      Provider Signature ___________________________________________________      These reports are needed for  compliance.  Please fax this completed form and a copy of the Diabetic Eye Exam report to our office located at [address] [place] [address] as soon as possible via Fax [phone] attention Tanisha: Phone [phone]  We thank you for your assistance in treating our mutual patient.

## 2024-04-17 NOTE — TELEPHONE ENCOUNTER
Upon review of the In Basket request we were able to locate, review, and update the patient chart as requested for Diabetic Eye Exam.    Any additional questions or concerns should be emailed to the Practice Liaisons via the appropriate education email address, please do not reply via In Basket.    Thank you  Tanisha Zarate MA

## 2024-04-24 ENCOUNTER — OFFICE VISIT (OUTPATIENT)
Dept: PODIATRY | Facility: CLINIC | Age: 64
End: 2024-04-24
Payer: MEDICARE

## 2024-04-24 ENCOUNTER — APPOINTMENT (OUTPATIENT)
Dept: RADIOLOGY | Facility: CLINIC | Age: 64
End: 2024-04-24
Payer: MEDICARE

## 2024-04-24 VITALS
HEIGHT: 68 IN | SYSTOLIC BLOOD PRESSURE: 130 MMHG | BODY MASS INDEX: 27.06 KG/M2 | HEART RATE: 93 BPM | DIASTOLIC BLOOD PRESSURE: 79 MMHG

## 2024-04-24 DIAGNOSIS — R23.4 SCAB: ICD-10-CM

## 2024-04-24 DIAGNOSIS — R60.0 LOCALIZED EDEMA: ICD-10-CM

## 2024-04-24 DIAGNOSIS — M86.9 OSTEOMYELITIS OF TOE OF LEFT FOOT (HCC): Primary | ICD-10-CM

## 2024-04-24 DIAGNOSIS — M86.9 OSTEOMYELITIS OF TOE OF LEFT FOOT (HCC): ICD-10-CM

## 2024-04-24 DIAGNOSIS — L97.522 SKIN ULCER OF TOE OF LEFT FOOT WITH FAT LAYER EXPOSED (HCC): ICD-10-CM

## 2024-04-24 DIAGNOSIS — E11.8 DIABETIC FOOT (HCC): ICD-10-CM

## 2024-04-24 PROCEDURE — 11720 DEBRIDE NAIL 1-5: CPT | Performed by: PODIATRIST

## 2024-04-24 PROCEDURE — 99213 OFFICE O/P EST LOW 20 MIN: CPT | Performed by: PODIATRIST

## 2024-04-24 PROCEDURE — 73630 X-RAY EXAM OF FOOT: CPT

## 2024-04-24 NOTE — PROGRESS NOTES
Diabetic Foot Exam    Patient's shoes and socks were not removed.    Right Foot/Ankle   Right Foot Inspection  Amputation: amputation right foot (Comments: Right AKA)    Left Foot/Ankle  Left Foot Inspection  Skin Exam: dry skin and erythema. Amputation: amputation left foot (Comments: L 1,2 toes)    Sensory   Vibration: absent  Proprioception: absent  Monofilament testing: absent    Vascular  Capillary refills: elevated  The left DP pulse is 1+. The left PT pulse is 0.     Assign Risk Category  Deformity present  Loss of protective sensation  Weak pulses  Risk: 3          Assessment/Plan:    No problem-specific Assessment & Plan notes found for this encounter.       Diagnoses and all orders for this visit:    Osteomyelitis of toe of left foot (HCC)  -     X-ray foot left 3+ views; Future      -Rx sent for diabetic shoes  - Ulceration is actually healed  - He has not gotten diabetic shoes yet I think that he is having some spinning due to the lack of his 1 and 2 toes on the left hopefully with the use of diabetic shoes and offloading inserts this will help prevent any further issues  - Nail care rendered as below today  - Return next available for repeat nail care      Procedure: All mycotic toenails were reduced and debrided in length, width, and girth using a nail nipper and dremel.  All hyperkeratotic skin lesion(s) were sharply pared with a scalpel with no bleeding or evidence of ulceration.  Patient tolerated procedure(s) well without complications.  34570, q7      Subjective:      Patient ID: Ben Alvarez is a 64 y.o. male.    Patient returns for evaluation management of his left foot and left ankle and left leg.  Reports swelling that has now resolved.  Data irritation and wound on the left third toe likely due to wearing some other shoes, does not currently have his diabetic shoes did keep this area dressed and is currently healed upon examination        The following portions of the patient's history  "were reviewed and updated as appropriate: allergies, current medications, past family history, past medical history, past social history, past surgical history, and problem list.    Review of Systems   Constitutional:  Negative for chills and fever.   HENT:  Negative for ear pain and sore throat.    Eyes:  Negative for pain and visual disturbance.   Respiratory:  Negative for cough and shortness of breath.    Cardiovascular:  Negative for chest pain and palpitations.   Gastrointestinal:  Negative for abdominal pain and vomiting.   Genitourinary:  Negative for dysuria and hematuria.   Musculoskeletal:  Negative for arthralgias and back pain.   Skin:  Negative for color change and rash.   Neurological:  Negative for seizures and syncope.   All other systems reviewed and are negative.        Objective:      /79   Pulse 93   Ht 5' 8\" (1.727 m)   BMI 27.06 kg/m²          Physical Exam  Cardiovascular:      Pulses: Pulses are weak.           Dorsalis pedis pulses are 1+ on the left side.        Posterior tibial pulses are 0 on the left side.   Musculoskeletal:        Feet:    Feet:      Right foot: amputated     Left foot: amputated     Skin integrity: Erythema and dry skin present.   Skin:     Comments: Nail scab on the left third digit healed ulceration blanchable erythema consistent with irritation of the periwound.  Hammertoe orientation of lesser 3 digits hemosiderin deposits with +1-4 pitting edema, no open lesions    Nails x 3 elongated           "

## 2024-05-01 ENCOUNTER — TELEPHONE (OUTPATIENT)
Dept: ENDOCRINOLOGY | Facility: CLINIC | Age: 64
End: 2024-05-01

## 2024-06-10 ENCOUNTER — TELEPHONE (OUTPATIENT)
Age: 64
End: 2024-06-10

## 2024-06-10 ENCOUNTER — OFFICE VISIT (OUTPATIENT)
Dept: FAMILY MEDICINE CLINIC | Facility: CLINIC | Age: 64
End: 2024-06-10
Payer: MEDICARE

## 2024-06-10 VITALS
OXYGEN SATURATION: 97 % | HEIGHT: 68 IN | BODY MASS INDEX: 27.28 KG/M2 | HEART RATE: 79 BPM | SYSTOLIC BLOOD PRESSURE: 110 MMHG | TEMPERATURE: 97.9 F | WEIGHT: 180 LBS | DIASTOLIC BLOOD PRESSURE: 66 MMHG

## 2024-06-10 DIAGNOSIS — F17.200 TOBACCO DEPENDENCE: ICD-10-CM

## 2024-06-10 DIAGNOSIS — R05.1 ACUTE COUGH: ICD-10-CM

## 2024-06-10 DIAGNOSIS — R52 BODY ACHES: Primary | ICD-10-CM

## 2024-06-10 PROCEDURE — 99214 OFFICE O/P EST MOD 30 MIN: CPT

## 2024-06-10 PROCEDURE — 87636 SARSCOV2 & INF A&B AMP PRB: CPT

## 2024-06-10 RX ORDER — NICOTINE 21 MG/24HR
1 PATCH, TRANSDERMAL 24 HOURS TRANSDERMAL EVERY 24 HOURS
Qty: 28 PATCH | Refills: 0 | Status: SHIPPED | OUTPATIENT
Start: 2024-06-10

## 2024-06-10 RX ORDER — BENZONATATE 200 MG/1
200 CAPSULE ORAL 3 TIMES DAILY PRN
Qty: 20 CAPSULE | Refills: 0 | Status: SHIPPED | OUTPATIENT
Start: 2024-06-10

## 2024-06-10 NOTE — PROGRESS NOTES
Ambulatory Visit  Name: Ben Alvarez      : 1960      MRN: [id number]  Encounter Provider: VERONICA Pike  Encounter Date: 6/10/2024   Encounter department: [facility]    Assessment & Plan   1. Body aches  -     Covid/Flu- Office Collect Normal; Future  -     Covid/Flu- Office Collect Normal  2. Tobacco dependence  -     nicotine (NICODERM CQ) 21 mg/24 hr TD 24 hr patch; Place 1 patch on the skin over 24 hours every 24 hours  3. Acute cough  -     benzonatate (TESSALON) 200 MG capsule; Take 1 capsule (200 mg total) by mouth 3 (three) times a day as needed for cough    Requesting flu testing - collected in office today.  No signs of bacterial infection on exam today. Lungs CTAB. Discussed CXR if symptoms fail to improve or worsen.  Discussed OTC tx & supportive care.  ER precautions discussed with pt & nephew.         History of Present Illness       Presents with URI sympt x 3 days  Accompanied by nephew/caretaker    Reports cough, congestion, body aches, chills  Requesting flu testing  Taking Mucinex, Dayquil & Tylenol OTC  +sick contacts  Denies CP or SOB  No fevers    Review of Systems   Constitutional:  Positive for chills. Negative for fatigue and fever.   HENT:  Positive for congestion. Negative for ear discharge, ear pain, facial swelling, rhinorrhea, sinus pressure, sinus pain, sore throat and trouble swallowing.    Eyes:  Negative for photophobia, pain and visual disturbance.   Respiratory:  Positive for cough. Negative for chest tightness, shortness of breath and wheezing.    Cardiovascular:  Negative for chest pain, palpitations and leg swelling.   Gastrointestinal:  Negative for abdominal pain, diarrhea, nausea and vomiting.   Genitourinary:  Negative for dysuria, flank pain and hematuria.   Musculoskeletal:  Positive for myalgias. Negative for arthralgias, back pain and neck pain.   Skin:  Negative for pallor and wound.   Neurological:  Negative for  dizziness, syncope, weakness, numbness and headaches.   Psychiatric/Behavioral:  Negative for confusion and sleep disturbance.      Medical History Reviewed by provider this encounter:  Tobacco  Allergies  Meds  Problems  Med Hx  Surg Hx  Fam Hx       Current Outpatient Medications on File Prior to Visit   Medication Sig Dispense Refill    acetaminophen (TYLENOL) 325 mg tablet Take 3 tablets (975 mg total) by mouth every 8 (eight) hours  0    albuterol (PROVENTIL HFA,VENTOLIN HFA) 90 mcg/act inhaler Inhale 2 puffs every 4 (four) hours as needed for wheezing 18 g 3    apixaban (Eliquis) 5 mg Take 1 tablet (5 mg total) by mouth 2 (two) times a day 180 tablet 3    aspirin 81 mg chewable tablet Chew 81 mg daily      atorvastatin (LIPITOR) 20 mg tablet Take 1 tablet (20 mg total) by mouth daily 90 tablet 3    Blood Glucose Monitoring Suppl (OneTouch Verio) w/Device KIT Use to test blood sugars 3 times daily 1 kit 0    cholecalciferol (VITAMIN D3) 1,000 units tablet Take 2 tablets (2,000 Units total) by mouth daily (Patient taking differently: Take 2,000 Units by mouth daily) 12 tablet 0    Continuous Blood Gluc  (FreeStyle Ke 2 [place]) JOSEPH Use as directed 1 each 0    Continuous Blood Gluc Sensor (FreeStyle Ke 2 Sensor) MISC Change every 14 days 2 each 2    Diclofenac Sodium (VOLTAREN) 1 % Apply 4 g topically 4 (four) times a day as needed (as needed for pain) 50 g 1    DULoxetine (CYMBALTA) 60 mg delayed release capsule Take 1 capsule (60 mg total) by mouth daily at bedtime 90 capsule 1    Empagliflozin (Jardiance) 25 MG TABS Take 1 tablet (25 mg total) by mouth every morning (Patient taking differently: Take 25 mg by mouth every morning NOT IN HOME) 90 tablet 3    famotidine (PEPCID) 40 MG tablet Take 1 tablet (40 mg total) by mouth daily at bedtime as needed for heartburn 30 tablet 5    ferrous sulfate 324 (65 Fe) mg Take 1 tablet (324 mg total) by mouth daily before breakfast 30 tablet 5     "fluticasone (FLONASE) 50 mcg/act nasal spray 1 spray into each nostril daily 16 g 2    glucose blood test strip Use 1 each 3 (three) times daily after meals Use as instructed 270 each 3    insulin lispro (HumaLOG KwikPen) 100 units/mL injection pen Inject 9 units with brunch and 9 units with dinner +scale 15 mL 3    Insulin Pen Needle 31G X 5 MM MISC Use daily Pt to inject 4 X daily 100 each 5    Insulin Pen Needle 31G X 5 MM MISC 30 units sq 2X daily 100 each 3    Insulin Syringe-Needle U-100 (BD Veo Insulin Syringe U/F) 31G X 15/64\" 0.5 ML MISC Inject under the skin 3 (three) times a day Use as directed 300 each 5    levocetirizine (XYZAL) 5 MG tablet Take 1 tablet (5 mg total) by mouth every evening (Patient taking differently: Take 5 mg by mouth every evening NOT IN HOME) 90 tablet 3    lidocaine (LMX) 4 % cream Apply topically 4 (four) times a day as needed for mild pain 30 g 0    lisinopril (ZESTRIL) 10 mg tablet Take 1 tablet (10 mg total) by mouth daily 90 tablet 3    magnesium oxide (MAG-OX) 400 mg tablet Take 1 tablet (400 mg total) by mouth daily 90 tablet 3    methadone (DOLOPHINE) 10 mg tablet Take 70 mg by mouth daily,      multivitamin-minerals (CENTRUM ADULTS) tablet Take 1 tablet by mouth daily  0    naloxone (NARCAN) 4 mg/0.1 mL nasal spray Administer 1 spray into a nostril. If no response after 2-3 minutes, give another dose in the other nostril using a new spray. 1 each 1    pantoprazole (PROTONIX) 40 mg tablet Take 1 PO BID 30 mins prior to a meal. 60 tablet 5    polyethylene glycol (GLYCOLAX) 17 GM/SCOOP powder Take 1/2-1 capful daily. 578 g 5    prochlorperazine (COMPAZINE) 5 mg tablet Take 1 tablet (5 mg total) by mouth every 6 (six) hours as needed for nausea or vomiting 45 tablet 5    silver sulfadiazine (SILVADENE,SSD) 1 % cream Apply topically if needed (open wound) 400 g 3    sitaGLIPtin-metFORMIN (Janumet)  MG per tablet Take 1 tablet by mouth 2 (two) times a day with meals 180 " "tablet 3    traZODone (DESYREL) 50 mg tablet Take 1 tablet (50 mg total) by mouth daily at bedtime as needed for sleep 90 tablet 3    Tresiba FlexTouch 100 units/mL injection pen Inject 30 units at dinner time daily 15 mL 1    [DISCONTINUED] nicotine (NICODERM CQ) 21 mg/24 hr TD 24 hr patch Place 1 patch on the skin over 24 hours every 24 hours 28 patch 0     No current facility-administered medications on file prior to visit.      Social History     Tobacco Use    Smoking status: Every Day     Current packs/day: 1.00     Types: Cigarettes     Passive exposure: Current    Smokeless tobacco: Never    Tobacco comments:     Has patches   Vaping Use    Vaping status: Never Used   Substance and Sexual Activity    Alcohol use: Not Currently    Drug use: Not Currently     Types: Heroin     Comment: clean since 2003, now on methadone    Sexual activity: Not on file     Objective     /66 (BP Location: Left arm, Patient Position: Sitting, Cuff Size: Standard)   Pulse 79   Temp 97.9 °F (36.6 °C) (Tympanic)   Ht 5' 8\" (1.727 m) Comment: verbal, wheelchair  Wt 81.6 kg (180 lb) Comment: verbal, wheelchair  SpO2 97%   BMI 27.37 kg/m²     Physical Exam  Vitals and nursing note reviewed.   Constitutional:       General: He is not in acute distress.     Appearance: Normal appearance. He is well-developed. He is not ill-appearing.   HENT:      Head: Normocephalic and atraumatic.      Jaw: There is normal jaw occlusion.      Right Ear: Tympanic membrane normal.      Left Ear: Tympanic membrane normal.      Nose: Nose normal.      Mouth/Throat:      Pharynx: Oropharynx is clear. No oropharyngeal exudate or posterior oropharyngeal erythema.   Eyes:      Extraocular Movements: Extraocular movements intact.      Conjunctiva/sclera: Conjunctivae normal.   Neck:      Thyroid: No thyroid mass.      Vascular: No carotid bruit or JVD.      Trachea: Trachea and phonation normal.   Cardiovascular:      Rate and Rhythm: Normal rate and " regular rhythm.      Heart sounds: S1 normal and S2 normal. No murmur heard.  Pulmonary:      Effort: Pulmonary effort is normal. No tachypnea or respiratory distress.      Breath sounds: Normal breath sounds and air entry. No stridor. No decreased breath sounds.      Comments: CTAB  No cough on exam  Chest:      Chest wall: No tenderness.   Abdominal:      General: Bowel sounds are normal. There is no distension.      Palpations: Abdomen is soft.      Tenderness: There is no abdominal tenderness. There is no right CVA tenderness or left CVA tenderness.   Musculoskeletal:         General: No swelling.      Cervical back: Normal range of motion and neck supple.      Right lower leg: No edema.      Left lower leg: No edema.   Lymphadenopathy:      Cervical: Cervical adenopathy present.   Skin:     General: Skin is warm and dry.      Capillary Refill: Capillary refill takes less than 2 seconds.      Findings: No rash.   Neurological:      General: No focal deficit present.      Mental Status: He is alert and oriented to person, place, and time.      Cranial Nerves: Cranial nerves 2-12 are intact.      Sensory: Sensation is intact.      Motor: Motor function is intact.      Coordination: Coordination is intact.      Gait: Gait is intact.   Psychiatric:         Mood and Affect: Mood normal.         Behavior: Behavior is cooperative.       Administrative Statements

## 2024-06-11 LAB
FLUAV RNA RESP QL NAA+PROBE: NEGATIVE
FLUBV RNA RESP QL NAA+PROBE: NEGATIVE
SARS-COV-2 RNA RESP QL NAA+PROBE: NEGATIVE

## 2024-06-21 DIAGNOSIS — Z79.4 TYPE 2 DIABETES MELLITUS WITH DIABETIC POLYNEUROPATHY, WITH LONG-TERM CURRENT USE OF INSULIN (HCC): ICD-10-CM

## 2024-06-21 DIAGNOSIS — E11.42 TYPE 2 DIABETES MELLITUS WITH DIABETIC POLYNEUROPATHY, WITH LONG-TERM CURRENT USE OF INSULIN (HCC): ICD-10-CM

## 2024-06-21 RX ORDER — INSULIN DEGLUDEC 100 U/ML
INJECTION, SOLUTION SUBCUTANEOUS
Qty: 15 ML | Refills: 0 | Status: SHIPPED | OUTPATIENT
Start: 2024-06-21

## 2024-07-01 ENCOUNTER — TELEPHONE (OUTPATIENT)
Age: 64
End: 2024-07-01

## 2024-07-01 NOTE — TELEPHONE ENCOUNTER
Caller: Cara/[facility]    Doctor: Dr. Ta/Maranda    Reason for call: SWO faxed to office 3 times and still not here-she will fax again to [phone] as that is the number I have in the Podiatry directory for [place] office fax.    Call back#: [phone]

## 2024-07-01 NOTE — TELEPHONE ENCOUNTER
Patient calling to ask for refill of Tresiba.  Upon review of chart, looks like Tresiba was sent to the [facility] on 6/21.  Informed patient, he will call [facility].

## 2024-07-03 DIAGNOSIS — R11.0 NAUSEA: ICD-10-CM

## 2024-07-03 DIAGNOSIS — K21.9 GASTROESOPHAGEAL REFLUX DISEASE, UNSPECIFIED WHETHER ESOPHAGITIS PRESENT: ICD-10-CM

## 2024-07-03 DIAGNOSIS — K22.70 BARRETT'S ESOPHAGUS WITHOUT DYSPLASIA: ICD-10-CM

## 2024-07-03 RX ORDER — FAMOTIDINE 40 MG/1
TABLET, FILM COATED ORAL
Qty: 30 TABLET | Refills: 5 | Status: SHIPPED | OUTPATIENT
Start: 2024-07-03

## 2024-07-03 RX ORDER — PANTOPRAZOLE SODIUM 40 MG/1
TABLET, DELAYED RELEASE ORAL
Qty: 60 TABLET | Refills: 5 | Status: SHIPPED | OUTPATIENT
Start: 2024-07-03

## 2024-08-09 DIAGNOSIS — E78.5 HYPERLIPIDEMIA ASSOCIATED WITH TYPE 2 DIABETES MELLITUS  (HCC): ICD-10-CM

## 2024-08-09 DIAGNOSIS — Z79.4 TYPE 2 DIABETES MELLITUS WITH DIABETIC POLYNEUROPATHY, WITH LONG-TERM CURRENT USE OF INSULIN (HCC): ICD-10-CM

## 2024-08-09 DIAGNOSIS — E11.42 TYPE 2 DIABETES MELLITUS WITH DIABETIC POLYNEUROPATHY, WITH LONG-TERM CURRENT USE OF INSULIN (HCC): ICD-10-CM

## 2024-08-09 DIAGNOSIS — E11.69 HYPERLIPIDEMIA ASSOCIATED WITH TYPE 2 DIABETES MELLITUS  (HCC): ICD-10-CM

## 2024-08-09 DIAGNOSIS — I74.10 AORTIC THROMBUS (HCC): ICD-10-CM

## 2024-08-09 DIAGNOSIS — J30.2 SEASONAL ALLERGIES: ICD-10-CM

## 2024-08-11 RX ORDER — EMPAGLIFLOZIN 25 MG/1
25 TABLET, FILM COATED ORAL EVERY MORNING
Qty: 90 TABLET | Refills: 1 | Status: SHIPPED | OUTPATIENT
Start: 2024-08-11

## 2024-08-11 RX ORDER — LEVOCETIRIZINE DIHYDROCHLORIDE 5 MG/1
5 TABLET, FILM COATED ORAL EVERY EVENING
Qty: 90 TABLET | Refills: 1 | Status: SHIPPED | OUTPATIENT
Start: 2024-08-11

## 2024-08-11 RX ORDER — LISINOPRIL 10 MG/1
10 TABLET ORAL DAILY
Qty: 90 TABLET | Refills: 1 | Status: SHIPPED | OUTPATIENT
Start: 2024-08-11

## 2024-08-11 RX ORDER — ATORVASTATIN CALCIUM 20 MG/1
20 TABLET, FILM COATED ORAL DAILY
Qty: 30 TABLET | Refills: 0 | Status: SHIPPED | OUTPATIENT
Start: 2024-08-11

## 2024-08-12 DIAGNOSIS — E11.65 TYPE 2 DIABETES MELLITUS WITH HYPERGLYCEMIA, WITH LONG-TERM CURRENT USE OF INSULIN (HCC): Primary | Chronic | ICD-10-CM

## 2024-08-12 DIAGNOSIS — Z79.4 TYPE 2 DIABETES MELLITUS WITH HYPERGLYCEMIA, WITH LONG-TERM CURRENT USE OF INSULIN (HCC): Primary | Chronic | ICD-10-CM

## 2024-08-12 NOTE — TELEPHONE ENCOUNTER
[place] called to f/u on medication refills.  Informed [place] that the following refills were sent in yesterday:    Jardiance  Xyzal  Lisinopril  Lipitor    Informed kenya that labs will need to be done prior to next refill request.  Verbalized understanding.  Failed to inform [place] that pt needs to schedule a f/u.  Please call pt.

## 2024-08-14 NOTE — TELEPHONE ENCOUNTER
Called patient and spoke with his nephew Nolan. He said he will call us back to schedule his Medicare Annual Wellness due to having some car troubles. He states he will call back some time after 8/19/2024.

## 2024-08-21 ENCOUNTER — TELEPHONE (OUTPATIENT)
Age: 64
End: 2024-08-21

## 2024-08-21 DIAGNOSIS — E11.42 TYPE 2 DIABETES MELLITUS WITH DIABETIC POLYNEUROPATHY, WITH LONG-TERM CURRENT USE OF INSULIN (HCC): ICD-10-CM

## 2024-08-21 DIAGNOSIS — Z79.4 TYPE 2 DIABETES MELLITUS WITH DIABETIC POLYNEUROPATHY, WITH LONG-TERM CURRENT USE OF INSULIN (HCC): ICD-10-CM

## 2024-08-21 RX ORDER — INSULIN DEGLUDEC 100 U/ML
INJECTION, SOLUTION SUBCUTANEOUS
Qty: 15 ML | Refills: 0 | Status: SHIPPED | OUTPATIENT
Start: 2024-08-21

## 2024-08-21 NOTE — TELEPHONE ENCOUNTER
Patient called to reschedule his appointment- there were no available appointments that I could see. He needs a refill of his Tresiba and there are no refills on the script and he believes he needs an appointment in order to get it refilled.    Please advise on an appointment and refilling his Tresiba.    He also requested a refill of his Lispro which I sent the request for.

## 2024-08-22 ENCOUNTER — TELEPHONE (OUTPATIENT)
Age: 64
End: 2024-08-22

## 2024-08-22 RX ORDER — INSULIN LISPRO 100 [IU]/ML
INJECTION, SOLUTION INTRAVENOUS; SUBCUTANEOUS
Qty: 15 ML | Refills: 3 | Status: SHIPPED | OUTPATIENT
Start: 2024-08-22

## 2024-08-22 NOTE — TELEPHONE ENCOUNTER
5 months ago Annika Patel PA-C [facility] [place] Office Visit

## 2024-09-12 DIAGNOSIS — I74.10 AORTIC THROMBUS (HCC): ICD-10-CM

## 2024-09-12 RX ORDER — APIXABAN 5 MG/1
5 TABLET, FILM COATED ORAL 2 TIMES DAILY
Qty: 180 TABLET | Refills: 0 | Status: SHIPPED | OUTPATIENT
Start: 2024-09-12

## 2024-09-12 NOTE — TELEPHONE ENCOUNTER
Jennifer called stating pt is all out Voltaren 1% and asking if PCP would send a prescription to [facility], [place]?

## 2024-09-16 DIAGNOSIS — I10 ESSENTIAL HYPERTENSION: Primary | Chronic | ICD-10-CM

## 2024-10-11 DIAGNOSIS — E11.42 TYPE 2 DIABETES MELLITUS WITH DIABETIC POLYNEUROPATHY, WITH LONG-TERM CURRENT USE OF INSULIN (HCC): ICD-10-CM

## 2024-10-11 DIAGNOSIS — Z79.4 TYPE 2 DIABETES MELLITUS WITH DIABETIC POLYNEUROPATHY, WITH LONG-TERM CURRENT USE OF INSULIN (HCC): ICD-10-CM

## 2024-10-11 RX ORDER — INSULIN DEGLUDEC 100 U/ML
INJECTION, SOLUTION SUBCUTANEOUS
Qty: 15 ML | Refills: 0 | Status: SHIPPED | OUTPATIENT
Start: 2024-10-11

## 2024-10-13 DIAGNOSIS — E78.5 HYPERLIPIDEMIA ASSOCIATED WITH TYPE 2 DIABETES MELLITUS  (HCC): ICD-10-CM

## 2024-10-13 DIAGNOSIS — E11.69 HYPERLIPIDEMIA ASSOCIATED WITH TYPE 2 DIABETES MELLITUS  (HCC): ICD-10-CM

## 2024-10-15 RX ORDER — ATORVASTATIN CALCIUM 20 MG/1
20 TABLET, FILM COATED ORAL DAILY
Qty: 30 TABLET | Refills: 0 | Status: SHIPPED | OUTPATIENT
Start: 2024-10-15

## 2024-11-23 DIAGNOSIS — E11.42 TYPE 2 DIABETES MELLITUS WITH DIABETIC POLYNEUROPATHY, WITH LONG-TERM CURRENT USE OF INSULIN (HCC): ICD-10-CM

## 2024-11-23 DIAGNOSIS — G89.4 CHRONIC PAIN SYNDROME: ICD-10-CM

## 2024-11-23 DIAGNOSIS — Z79.4 TYPE 2 DIABETES MELLITUS WITH DIABETIC POLYNEUROPATHY, WITH LONG-TERM CURRENT USE OF INSULIN (HCC): ICD-10-CM

## 2024-11-23 DIAGNOSIS — S78.111A ABOVE-KNEE AMPUTATION OF RIGHT LOWER EXTREMITY (HCC): ICD-10-CM

## 2024-11-23 RX ORDER — DULOXETIN HYDROCHLORIDE 60 MG/1
60 CAPSULE, DELAYED RELEASE ORAL
Qty: 90 CAPSULE | Refills: 0 | Status: SHIPPED | OUTPATIENT
Start: 2024-11-23

## 2024-11-25 ENCOUNTER — TELEPHONE (OUTPATIENT)
Age: 64
End: 2024-11-25

## 2024-11-25 DIAGNOSIS — Z79.4 TYPE 2 DIABETES MELLITUS WITH HYPERGLYCEMIA, WITH LONG-TERM CURRENT USE OF INSULIN (HCC): Primary | Chronic | ICD-10-CM

## 2024-11-25 DIAGNOSIS — E11.65 TYPE 2 DIABETES MELLITUS WITH HYPERGLYCEMIA, WITH LONG-TERM CURRENT USE OF INSULIN (HCC): Primary | Chronic | ICD-10-CM

## 2024-11-25 DIAGNOSIS — Z12.5 SCREENING FOR MALIGNANT NEOPLASM OF PROSTATE: ICD-10-CM

## 2024-11-25 RX ORDER — SITAGLIPTIN AND METFORMIN HYDROCHLORIDE 1000; 50 MG/1; MG/1
1 TABLET, FILM COATED ORAL 2 TIMES DAILY WITH MEALS
Qty: 180 TABLET | Refills: 0 | Status: SHIPPED | OUTPATIENT
Start: 2024-11-25

## 2024-11-25 NOTE — TELEPHONE ENCOUNTER
Brother called asking if patient had lab work ordered for his upcoming appointment. Explained he has a CMP, CBC and LIPID level. He is asking if a HBGA1C can be added so that they can see if this level has increased from last year.

## 2024-11-27 DIAGNOSIS — E11.42 TYPE 2 DIABETES MELLITUS WITH DIABETIC POLYNEUROPATHY, WITH LONG-TERM CURRENT USE OF INSULIN (HCC): ICD-10-CM

## 2024-11-27 DIAGNOSIS — Z79.4 TYPE 2 DIABETES MELLITUS WITH DIABETIC POLYNEUROPATHY, WITH LONG-TERM CURRENT USE OF INSULIN (HCC): ICD-10-CM

## 2024-11-27 RX ORDER — INSULIN DEGLUDEC 100 U/ML
INJECTION, SOLUTION SUBCUTANEOUS
Qty: 15 ML | Refills: 3 | Status: SHIPPED | OUTPATIENT
Start: 2024-11-27

## 2024-12-02 ENCOUNTER — APPOINTMENT (OUTPATIENT)
Dept: LAB | Facility: CLINIC | Age: 64
End: 2024-12-02
Payer: MEDICARE

## 2024-12-02 DIAGNOSIS — Z12.5 SCREENING FOR MALIGNANT NEOPLASM OF PROSTATE: ICD-10-CM

## 2024-12-02 DIAGNOSIS — I10 ESSENTIAL HYPERTENSION: Chronic | ICD-10-CM

## 2024-12-02 DIAGNOSIS — Z79.4 TYPE 2 DIABETES MELLITUS WITH HYPERGLYCEMIA, WITH LONG-TERM CURRENT USE OF INSULIN (HCC): ICD-10-CM

## 2024-12-02 DIAGNOSIS — E11.65 TYPE 2 DIABETES MELLITUS WITH HYPERGLYCEMIA, WITH LONG-TERM CURRENT USE OF INSULIN (HCC): ICD-10-CM

## 2024-12-02 LAB
ALBUMIN SERPL BCG-MCNC: 4 G/DL (ref 3.5–5)
ALP SERPL-CCNC: 111 U/L (ref 34–104)
ALT SERPL W P-5'-P-CCNC: 20 U/L (ref 7–52)
ANION GAP SERPL CALCULATED.3IONS-SCNC: 9 MMOL/L (ref 4–13)
AST SERPL W P-5'-P-CCNC: 18 U/L (ref 13–39)
BASOPHILS # BLD AUTO: 0.11 THOUSANDS/ΜL (ref 0–0.1)
BASOPHILS NFR BLD AUTO: 1 % (ref 0–1)
BILIRUB SERPL-MCNC: 0.38 MG/DL (ref 0.2–1)
BUN SERPL-MCNC: 19 MG/DL (ref 5–25)
CALCIUM SERPL-MCNC: 9.5 MG/DL (ref 8.4–10.2)
CHLORIDE SERPL-SCNC: 102 MMOL/L (ref 96–108)
CHOLEST SERPL-MCNC: 127 MG/DL (ref ?–200)
CO2 SERPL-SCNC: 28 MMOL/L (ref 21–32)
CREAT SERPL-MCNC: 1.13 MG/DL (ref 0.6–1.3)
CREAT UR-MCNC: 76.1 MG/DL
EOSINOPHIL # BLD AUTO: 0.78 THOUSAND/ΜL (ref 0–0.61)
EOSINOPHIL NFR BLD AUTO: 7 % (ref 0–6)
ERYTHROCYTE [DISTWIDTH] IN BLOOD BY AUTOMATED COUNT: 13.8 % (ref 11.6–15.1)
GFR SERPL CREATININE-BSD FRML MDRD: 67 ML/MIN/1.73SQ M
GLUCOSE P FAST SERPL-MCNC: 170 MG/DL (ref 65–99)
HCT VFR BLD AUTO: 43 % (ref 36.5–49.3)
HDLC SERPL-MCNC: 35 MG/DL
HGB BLD-MCNC: 14 G/DL (ref 12–17)
IMM GRANULOCYTES # BLD AUTO: 0.03 THOUSAND/UL (ref 0–0.2)
IMM GRANULOCYTES NFR BLD AUTO: 0 % (ref 0–2)
LDLC SERPL CALC-MCNC: 53 MG/DL (ref 0–100)
LYMPHOCYTES # BLD AUTO: 2.37 THOUSANDS/ΜL (ref 0.6–4.47)
LYMPHOCYTES NFR BLD AUTO: 21 % (ref 14–44)
MCH RBC QN AUTO: 29.6 PG (ref 26.8–34.3)
MCHC RBC AUTO-ENTMCNC: 32.6 G/DL (ref 31.4–37.4)
MCV RBC AUTO: 91 FL (ref 82–98)
MICROALBUMIN UR-MCNC: 56.7 MG/L
MICROALBUMIN/CREAT 24H UR: 75 MG/G CREATININE (ref 0–30)
MONOCYTES # BLD AUTO: 0.69 THOUSAND/ΜL (ref 0.17–1.22)
MONOCYTES NFR BLD AUTO: 6 % (ref 4–12)
NEUTROPHILS # BLD AUTO: 7.23 THOUSANDS/ΜL (ref 1.85–7.62)
NEUTS SEG NFR BLD AUTO: 65 % (ref 43–75)
NONHDLC SERPL-MCNC: 92 MG/DL
NRBC BLD AUTO-RTO: 0 /100 WBCS
PLATELET # BLD AUTO: 200 THOUSANDS/UL (ref 149–390)
PMV BLD AUTO: 11.9 FL (ref 8.9–12.7)
POTASSIUM SERPL-SCNC: 4.8 MMOL/L (ref 3.5–5.3)
PROT SERPL-MCNC: 7.9 G/DL (ref 6.4–8.4)
PSA SERPL-MCNC: 0.22 NG/ML (ref 0–4)
RBC # BLD AUTO: 4.73 MILLION/UL (ref 3.88–5.62)
SODIUM SERPL-SCNC: 139 MMOL/L (ref 135–147)
T4 FREE SERPL-MCNC: 1.09 NG/DL (ref 0.61–1.12)
TRIGL SERPL-MCNC: 193 MG/DL (ref ?–150)
TSH SERPL DL<=0.05 MIU/L-ACNC: 5.03 UIU/ML (ref 0.45–4.5)
WBC # BLD AUTO: 11.21 THOUSAND/UL (ref 4.31–10.16)

## 2024-12-02 PROCEDURE — 84443 ASSAY THYROID STIM HORMONE: CPT

## 2024-12-02 PROCEDURE — 82570 ASSAY OF URINE CREATININE: CPT

## 2024-12-02 PROCEDURE — 80061 LIPID PANEL: CPT

## 2024-12-02 PROCEDURE — 80053 COMPREHEN METABOLIC PANEL: CPT

## 2024-12-02 PROCEDURE — 84439 ASSAY OF FREE THYROXINE: CPT

## 2024-12-02 PROCEDURE — 82043 UR ALBUMIN QUANTITATIVE: CPT

## 2024-12-02 PROCEDURE — G0103 PSA SCREENING: HCPCS

## 2024-12-02 PROCEDURE — 85025 COMPLETE CBC W/AUTO DIFF WBC: CPT

## 2024-12-02 PROCEDURE — 36415 COLL VENOUS BLD VENIPUNCTURE: CPT

## 2024-12-03 ENCOUNTER — RESULTS FOLLOW-UP (OUTPATIENT)
Dept: FAMILY MEDICINE CLINIC | Facility: CLINIC | Age: 64
End: 2024-12-03

## 2024-12-04 ENCOUNTER — OFFICE VISIT (OUTPATIENT)
Dept: FAMILY MEDICINE CLINIC | Facility: CLINIC | Age: 64
End: 2024-12-04
Payer: MEDICARE

## 2024-12-04 VITALS
HEART RATE: 78 BPM | SYSTOLIC BLOOD PRESSURE: 104 MMHG | DIASTOLIC BLOOD PRESSURE: 80 MMHG | WEIGHT: 170 LBS | OXYGEN SATURATION: 98 % | HEIGHT: 68 IN | TEMPERATURE: 98.2 F | BODY MASS INDEX: 25.76 KG/M2

## 2024-12-04 DIAGNOSIS — E27.40 UNSPECIFIED ADRENOCORTICAL INSUFFICIENCY (HCC): ICD-10-CM

## 2024-12-04 DIAGNOSIS — E44.0 MODERATE PROTEIN-CALORIE MALNUTRITION (HCC): ICD-10-CM

## 2024-12-04 DIAGNOSIS — G54.7 PHANTOM LIMB (HCC): ICD-10-CM

## 2024-12-04 DIAGNOSIS — B18.2 CHRONIC HEPATITIS C WITHOUT HEPATIC COMA (HCC): ICD-10-CM

## 2024-12-04 DIAGNOSIS — I50.9 HEART FAILURE, UNSPECIFIED HF CHRONICITY, UNSPECIFIED HEART FAILURE TYPE (HCC): ICD-10-CM

## 2024-12-04 DIAGNOSIS — E11.42 TYPE 2 DIABETES MELLITUS WITH DIABETIC POLYNEUROPATHY, UNSPECIFIED WHETHER LONG TERM INSULIN USE (HCC): ICD-10-CM

## 2024-12-04 DIAGNOSIS — Z72.0 TOBACCO USE: ICD-10-CM

## 2024-12-04 DIAGNOSIS — Z00.00 MEDICARE ANNUAL WELLNESS VISIT, SUBSEQUENT: Primary | ICD-10-CM

## 2024-12-04 DIAGNOSIS — Z23 ENCOUNTER FOR IMMUNIZATION: ICD-10-CM

## 2024-12-04 DIAGNOSIS — L89.150 PRESSURE ULCER OF SACRAL REGION, UNSTAGEABLE (HCC): ICD-10-CM

## 2024-12-04 DIAGNOSIS — F17.210 NICOTINE DEPENDENCE, CIGARETTES, UNCOMPLICATED: ICD-10-CM

## 2024-12-04 DIAGNOSIS — E11.8 DIABETIC FOOT (HCC): ICD-10-CM

## 2024-12-04 DIAGNOSIS — F11.20 OPIOID DEPENDENCE, UNCOMPLICATED (HCC): ICD-10-CM

## 2024-12-04 DIAGNOSIS — L97.526 DIABETIC ULCER OF TOE OF LEFT FOOT ASSOCIATED WITH TYPE 2 DIABETES MELLITUS, WITH BONE INVOLVEMENT WITHOUT EVIDENCE OF NECROSIS (HCC): ICD-10-CM

## 2024-12-04 DIAGNOSIS — J84.10 PULMONARY FIBROSIS (HCC): ICD-10-CM

## 2024-12-04 DIAGNOSIS — E11.621 DIABETIC ULCER OF TOE OF LEFT FOOT ASSOCIATED WITH TYPE 2 DIABETES MELLITUS, WITH BONE INVOLVEMENT WITHOUT EVIDENCE OF NECROSIS (HCC): ICD-10-CM

## 2024-12-04 DIAGNOSIS — I74.10 AORTIC THROMBUS (HCC): ICD-10-CM

## 2024-12-04 DIAGNOSIS — S99.922A INJURY OF LEFT FOOT, INITIAL ENCOUNTER: ICD-10-CM

## 2024-12-04 DIAGNOSIS — I69.359 CVA, OLD, HEMIPARESIS (HCC): ICD-10-CM

## 2024-12-04 PROBLEM — J96.01 ACUTE RESPIRATORY FAILURE WITH HYPOXIA (HCC): Status: RESOLVED | Noted: 2021-01-11 | Resolved: 2024-12-04

## 2024-12-04 LAB — SL AMB POCT HEMOGLOBIN AIC: 8.1 (ref ?–6.5)

## 2024-12-04 PROCEDURE — 99214 OFFICE O/P EST MOD 30 MIN: CPT | Performed by: NURSE PRACTITIONER

## 2024-12-04 PROCEDURE — G0439 PPPS, SUBSEQ VISIT: HCPCS | Performed by: NURSE PRACTITIONER

## 2024-12-04 PROCEDURE — 90471 IMMUNIZATION ADMIN: CPT

## 2024-12-04 PROCEDURE — 90662 IIV NO PRSV INCREASED AG IM: CPT

## 2024-12-04 PROCEDURE — 83036 HEMOGLOBIN GLYCOSYLATED A1C: CPT | Performed by: NURSE PRACTITIONER

## 2024-12-04 RX ORDER — BUPROPION HYDROCHLORIDE 100 MG/1
100 TABLET, EXTENDED RELEASE ORAL 2 TIMES DAILY
Qty: 60 TABLET | Refills: 1 | Status: SHIPPED | OUTPATIENT
Start: 2024-12-04

## 2024-12-04 NOTE — PROGRESS NOTES
Name: Ben Alvarez      : 1960      MRN: [id number]  Encounter Provider: VERONICA Conley  Encounter Date: 2024   Encounter department: [facility]    Assessment & Plan  Medicare annual wellness visit, subsequent         Encounter for immunization    Orders:    influenza vaccine, high-dose, PF 0.5 mL (Fluzone High Dose)    Diabetic foot (HCC)    Lab Results   Component Value Date    HGBA1C 8.4 (A) 2024            Tobacco use  Patient is currently smoking approximately 6 cigarettes/day.  He states that he uses it as a coping technique and he cannot get over the hump within the nicotine patches which she is using 21 mg.  Will try cutting down to 14 mg is looking for cost saving options as his insurance does not cover the patches.  Referral to smoking cessation program given to patient.  He has tried Chantix in the past but had a reaction to it.  He is willing to try Zyban possible side effects reviewed no history of depression or suicidal ideation.  He does admit that he does feel down at times because of his health so this may also help with his mood.  Orders:    Ambulatory Referral to Smoking Cessation Program; Future    CT lung screening program; Future    buPROPion (Wellbutrin SR) 100 mg 12 hr tablet; Take 1 tablet (100 mg total) by mouth 2 (two) times a day    Type 2 diabetes mellitus with diabetic polyneuropathy, unspecified whether long term insulin use (HCC)  Hemoglobin A1c is elevated at 8.1.  He is taking 30 units of his Tresiba and sliding scale of his fast acting insulin with meals between 7 to 10 units.  He is overdue for follow-up with endocrinologist and will call to schedule an appointment with them.  Lab Results   Component Value Date    HGBA1C 8.4 (A) 2024       Orders:    TSH, 3rd generation with Free T4 reflex; Future    CBC and differential; Future    Basic metabolic panel; Future    POCT hemoglobin A1c    Hemoglobin A1C;  Future    Injury of left foot, initial encounter  That he thinks when he was transferring from his wheelchair he twisted the left foot and caused injury to it as he has some bruising.  No pain but he does not have a lot of feeling in this foot.  He does follow with podiatrist but is overdue for visit with them will call to schedule.  No evidence of any cellulitis or infection in the foot.  Orders:    XR foot 2 vw left; Future    Diabetic ulcer of toe of left foot associated with type 2 diabetes mellitus, with bone involvement without evidence of necrosis (HCC)    Lab Results   Component Value Date    HGBA1C 8.4 (A) 03/18/2024            Pressure ulcer of sacral region, unstageable (HCC)         Phantom limb (HCC)  Stable is getting fitted for a new prosthetic.       Moderate protein-calorie malnutrition (HCC)  Counseled on diet.       Aortic thrombus (HCC)         Opioid dependence, uncomplicated (HCC)         CVA, old, hemiparesis (HCC)  Stable on Lipitor and aspirin daily blood pressure is controlled.       Heart failure, unspecified HF chronicity, unspecified heart failure type (HCC)  Wt Readings from Last 3 Encounters:   12/04/24 77.1 kg (170 lb)   06/10/24 81.6 kg (180 lb)   10/13/23 80.7 kg (178 lb)   Weight has been stable no evidence of swelling.  No symptoms.                 Pulmonary fibrosis (HCC)  No symptoms at this time.       Unspecified adrenocortical insufficiency (HCC)         Chronic hepatitis C without hepatic coma (HCC)         Nicotine dependence, cigarettes, uncomplicated    Orders:    CT lung screening program; Future       Preventive health issues were discussed with patient, and age appropriate screening tests were ordered as noted in patient's After Visit Summary. Personalized health advice and appropriate referrals for health education or preventive services given if needed, as noted in patient's After Visit Summary.    History of Present Illness     HPI   Patient Care Team:  Tere Waller  VERONICA Brown as PCP - General  Annika Cazares MD as PCP - PCP-[employer] (RTE)  Anthony Ta DPM (Podiatry)  Annika Patel PA-C as Physician Assistant (Physician Assistant)    Review of Systems   Constitutional:  Negative for chills and fever.   HENT:  Negative for ear pain and sore throat.    Eyes:  Negative for pain and visual disturbance.   Respiratory:  Positive for cough (in the morning when he wakes up and then it clears). Negative for shortness of breath and wheezing.    Cardiovascular:  Negative for chest pain and palpitations.   Gastrointestinal:  Negative for abdominal pain, constipation, diarrhea, nausea and vomiting.   Genitourinary:  Negative for dysuria and hematuria.   Musculoskeletal:  Negative for arthralgias and back pain.   Skin:  Negative for color change and rash.   Neurological:  Negative for seizures and syncope.   All other systems reviewed and are negative.    Medical History Reviewed by provider this encounter:       Annual Wellness Visit Questionnaire   Ben SMITH is here for his Subsequent Wellness visit. Last Medicare Wellness visit information reviewed, patient interviewed, no change since last AWV.     Health Risk Assessment:   Patient rates overall health as good. Patient feels that their physical health rating is same. Patient is satisfied with their life. Eyesight was rated as same. Hearing was rated as same. Patient feels that their emotional and mental health rating is slightly better. Patients states they are never, rarely angry. Patient states they are often unusually tired/fatigued. Pain experienced in the last 7 days has been some. Patient's pain rating has been 8/10. Gets cramp in leg on and off     Depression Screening:   PHQ-9 Score: 0      Fall Risk Screening:   In the past year, patient has experienced: no history of falling in past year      Home Safety:  Patient has trouble with stairs inside or outside of their home. Patient has working smoke  alarms and has working carbon monoxide detector. Home safety hazards include: none.     Nutrition:   Current diet is Regular and Diabetic.     Medications:   Patient is currently taking over-the-counter supplements. OTC medications include: see medication list. Patient is able to manage medications.     Activities of Daily Living (ADLs)/Instrumental Activities of Daily Living (IADLs):   Walk and transfer into and out of bed and chair?: No  Dress and groom yourself?: Yes    Bathe or shower yourself?: Yes    Feed yourself? Yes  Do your laundry/housekeeping?: No  Manage your money, pay your bills and track your expenses?: Yes  Make your own meals?: No    Do your own shopping?: No    Previous Hospitalizations:   Any hospitalizations or ED visits within the last 12 months?: No      Advance Care Planning:   Living will: No    Durable POA for healthcare: No    Advanced directive: Yes    Advanced directive counseling given: Yes    ACP document given: Yes    Patient declined ACP directive: Yes    End of Life Decisions reviewed with patient: Yes    Provider agrees with end of life decisions: Yes      PREVENTIVE SCREENINGS      Cardiovascular Screening:    General: Screening Not Indicated and History Lipid Disorder      Diabetes Screening:     General: Screening Not Indicated and History Diabetes      Colorectal Cancer Screening:     General: Screening Current      Prostate Cancer Screening:    General: Screening Current      Abdominal Aortic Aneurysm (AAA) Screening:    Risk factors include: age between 65-76 yo and tobacco use        Lung Cancer Screening:     General: Screening Not Indicated      Hepatitis C Screening:    General: Screening Not Indicated and History Hepatitis C    Review of Current Opioid Use    Opioid Risk Tool (ORT) Interpretation: Complete Opioid Risk Tool (ORT)    Social Drivers of Health     Financial Resource Strain: Medium Risk (7/13/2023)    Overall Financial Resource Strain (CARDIA)     Difficulty  of Paying Living Expenses: Somewhat hard   Transportation Needs: No Transportation Needs (7/13/2023)    PRAPARE - Transportation     Lack of Transportation (Medical): No     Lack of Transportation (Non-Medical): No    Received from [facility]     No results found.    Objective   There were no vitals taken for this visit.    Physical Exam  Constitutional:       General: He is not in acute distress.     Appearance: Normal appearance. He is not ill-appearing or toxic-appearing.   HENT:      Head: Normocephalic and atraumatic.      Right Ear: Tympanic membrane, ear canal and external ear normal. There is no impacted cerumen.      Left Ear: Tympanic membrane, ear canal and external ear normal. There is no impacted cerumen.      Nose: Nose normal. No congestion or rhinorrhea.      Mouth/Throat:      Mouth: Mucous membranes are moist.      Pharynx: Oropharynx is clear. No oropharyngeal exudate or posterior oropharyngeal erythema.   Eyes:      General: No scleral icterus.        Right eye: No discharge.         Left eye: No discharge.      Conjunctiva/sclera: Conjunctivae normal.      Pupils: Pupils are equal, round, and reactive to light.   Cardiovascular:      Rate and Rhythm: Normal rate and regular rhythm.      Pulses: Pulses are weak.           Dorsalis pedis pulses are 1+ on the left side.        Posterior tibial pulses are 1+ on the left side.      Heart sounds: Normal heart sounds. No murmur heard.     No friction rub. No gallop.   Pulmonary:      Effort: Pulmonary effort is normal. No respiratory distress.      Breath sounds: Normal breath sounds. No stridor. No wheezing, rhonchi or rales.   Abdominal:      General: Abdomen is flat. Bowel sounds are normal. There is no distension.      Palpations: Abdomen is soft. There is no mass.      Tenderness: There is no abdominal tenderness.   Musculoskeletal:      Cervical back: Normal range of motion and neck supple. No rigidity. No muscular tenderness.       Right lower leg: No edema.      Left lower leg: No edema.   Feet:      Right foot: amputated     Left foot: amputated     Skin integrity: No ulcer, skin breakdown, erythema, warmth, callus or dry skin.   Lymphadenopathy:      Cervical: No cervical adenopathy.   Skin:     General: Skin is warm and dry.      Capillary Refill: Capillary refill takes less than 2 seconds.   Neurological:      General: No focal deficit present.      Mental Status: He is alert and oriented to person, place, and time. Mental status is at baseline.      Sensory: No sensory deficit.      Motor: No weakness.      Gait: Gait normal.   Psychiatric:         Mood and Affect: Mood normal.         Behavior: Behavior normal.         Thought Content: Thought content normal.         Judgment: Judgment normal.             Patient's shoes and socks removed.    Right Foot/Ankle   Right Foot Inspection  Amputation: amputation right foot     Left Foot/Ankle  Left Foot Inspection  Skin Exam: Skin not intact, no dry skin, no warmth, no erythema, no maceration, normal color, no pre-ulcer, no ulcer and no callus. Amputation: amputation left foot (Comments: thumb and 2nd toe)    Toe Exam: left toe deformity. No swelling, no tenderness and no erythema.     Sensory   Proprioception: absent  Monofilament testing: absent    Vascular  Capillary refills: < 3 seconds  The left DP pulse is 1+. The left PT pulse is 1+.     Assign Risk Category  Deformity present  Loss of protective sensation  Weak pulses  Risk: 3

## 2024-12-04 NOTE — ASSESSMENT & PLAN NOTE
Hemoglobin A1c is elevated at 8.1.  He is taking 30 units of his Tresiba and sliding scale of his fast acting insulin with meals between 7 to 10 units.  He is overdue for follow-up with endocrinologist and will call to schedule an appointment with them.  Lab Results   Component Value Date    HGBA1C 8.4 (A) 03/18/2024       Orders:    TSH, 3rd generation with Free T4 reflex; Future    CBC and differential; Future    Basic metabolic panel; Future    POCT hemoglobin A1c    Hemoglobin A1C; Future

## 2024-12-04 NOTE — PATIENT INSTRUCTIONS
Start taking the Zyban once a day and do this for 5 days and then increase to 2x a day.         Medicare Preventive Visit Patient Instructions  Thank you for completing your Welcome to Medicare Visit or Medicare Annual Wellness Visit today. Your next wellness visit will be due in one year (12/5/2025).  The screening/preventive services that you may require over the next 5-10 years are detailed below. Some tests may not apply to you based off risk factors and/or age. Screening tests ordered at today's visit but not completed yet may show as past due. Also, please note that scanned in results may not display below.  Preventive Screenings:  Service Recommendations Previous Testing/Comments   Colorectal Cancer Screening  Colonoscopy    Fecal Occult Blood Test (FOBT)/Fecal Immunochemical Test (FIT)  Fecal DNA/Cologuard Test  Flexible Sigmoidoscopy Age: 45-75 years old   Colonoscopy: every 10 years (May be performed more frequently if at higher risk)  OR  FOBT/FIT: every 1 year  OR  Cologuard: every 3 years  OR  Sigmoidoscopy: every 5 years  Screening may be recommended earlier than age 45 if at higher risk for colorectal cancer. Also, an individualized decision between you and your healthcare provider will decide whether screening between the ages of 76-85 would be appropriate. Colonoscopy: 03/12/2018  FOBT/FIT: Not on file  Cologuard: 02/26/2023  Sigmoidoscopy: Not on file          Prostate Cancer Screening Individualized decision between patient and health care provider in men between ages of 55-69   Medicare will cover every 12 months beginning on the day after your 50th birthday PSA: 0.223 ng/mL           Hepatitis C Screening Once for adults born between 1945 and 1965  More frequently in patients at high risk for Hepatitis C Hep C Antibody: 01/27/2023        Diabetes Screening 1-2 times per year if you're at risk for diabetes or have pre-diabetes Fasting glucose: 170 mg/dL (12/2/2024)  A1C: 8.4 (3/18/2024)       Cholesterol Screening Once every 5 years if you don't have a lipid disorder. May order more often based on risk factors. Lipid panel: 12/02/2024         Other Preventive Screenings Covered by Medicare:  Abdominal Aortic Aneurysm (AAA) Screening: covered once if your at risk. You're considered to be at risk if you have a family history of AAA or a male between the age of 65-75 who smoking at least 100 cigarettes in your lifetime.  Lung Cancer Screening: covers low dose CT scan once per year if you meet all of the following conditions: (1) Age 55-77; (2) No signs or symptoms of lung cancer; (3) Current smoker or have quit smoking within the last 15 years; (4) You have a tobacco smoking history of at least 20 pack years (packs per day x number of years you smoked); (5) You get a written order from a healthcare provider.  Glaucoma Screening: covered annually if you're considered high risk: (1) You have diabetes OR (2) Family history of glaucoma OR (3)  aged 50 and older OR (4)  American aged 65 and older  Osteoporosis Screening: covered every 2 years if you meet one of the following conditions: (1) Have a vertebral abnormality; (2) On glucocorticoid therapy for more than 3 months; (3) Have primary hyperparathyroidism; (4) On osteoporosis medications and need to assess response to drug therapy.  HIV Screening: covered annually if you're between the age of 15-65. Also covered annually if you are younger than 15 and older than 65 with risk factors for HIV infection. For pregnant patients, it is covered up to 3 times per pregnancy.    Immunizations:  Immunization Recommendations   Influenza Vaccine Annual influenza vaccination during flu season is recommended for all persons aged >= 6 months who do not have contraindications   Pneumococcal Vaccine   * Pneumococcal conjugate vaccine = PCV13 (Prevnar 13), PCV15 (Vaxneuvance), PCV20 (Prevnar 20)  * Pneumococcal polysaccharide vaccine = PPSV23  (Pneumovax) Adults 19-63 yo with certain risk factors or if 65+ yo  If never received any pneumonia vaccine: recommend Prevnar 20 (PCV20)  Give PCV20 if previously received 1 dose of PCV13 or PPSV23   Hepatitis B Vaccine 3 dose series if at intermediate or high risk (ex: diabetes, end stage renal disease, liver disease)   Respiratory syncytial virus (RSV) Vaccine - COVERED BY MEDICARE PART D  * RSVPreF3 (Arexvy) CDC recommends that adults 60 years of age and older may receive a single dose of RSV vaccine using shared clinical decision-making (SCDM)   Tetanus (Td) Vaccine - COST NOT COVERED BY MEDICARE PART B Following completion of primary series, a booster dose should be given every 10 years to maintain immunity against tetanus. Td may also be given as tetanus wound prophylaxis.   Tdap Vaccine - COST NOT COVERED BY MEDICARE PART B Recommended at least once for all adults. For pregnant patients, recommended with each pregnancy.   Shingles Vaccine (Shingrix) - COST NOT COVERED BY MEDICARE PART B  2 shot series recommended in those 19 years and older who have or will have weakened immune systems or those 50 years and older     Health Maintenance Due:      Topic Date Due    Colorectal Cancer Screening  03/12/2028    Hepatitis C Screening  Discontinued     Immunizations Due:      Topic Date Due    Influenza Vaccine (1) 09/01/2024    COVID-19 Vaccine (4 - 2024-25 season) 09/01/2024     Advance Directives   What are advance directives?  Advance directives are legal documents that state your wishes and plans for medical care. These plans are made ahead of time in case you lose your ability to make decisions for yourself. Advance directives can apply to any medical decision, such as the treatments you want, and if you want to donate organs.   What are the types of advance directives?  There are many types of advance directives, and each state has rules about how to use them. You may choose a combination of any of the  following:  Living will:  This is a written record of the treatment you want. You can also choose which treatments you do not want, which to limit, and which to stop at a certain time. This includes surgery, medicine, IV fluid, and tube feedings.   Durable power of  for healthcare (DPAHC):  This is a written record that states who you want to make healthcare choices for you when you are unable to make them for yourself. This person, called a proxy, is usually a family member or a friend. You may choose more than 1 proxy.  Do not resuscitate (DNR) order:  A DNR order is used in case your heart stops beating or you stop breathing. It is a request not to have certain forms of treatment, such as CPR. A DNR order may be included in other types of advance directives.  Medical directive:  This covers the care that you want if you are in a coma, near death, or unable to make decisions for yourself. You can list the treatments you want for each condition. Treatment may include pain medicine, surgery, blood transfusions, dialysis, IV or tube feedings, and a ventilator (breathing machine).  Values history:  This document has questions about your views, beliefs, and how you feel and think about life. This information can help others choose the care that you would choose.  Why are advance directives important?  An advance directive helps you control your care. Although spoken wishes may be used, it is better to have your wishes written down. Spoken wishes can be misunderstood, or not followed. Treatments may be given even if you do not want them. An advance directive may make it easier for your family to make difficult choices about your care.   Cigarette Smoking and Your Health   Risks to your health if you smoke:  Nicotine and other chemicals found in tobacco damage every cell in your body. Even if you are a light smoker, you have an increased risk for cancer, heart disease, and lung disease. If you are pregnant or  have diabetes, smoking increases your risk for complications.   Benefits to your health if you stop smoking:   You decrease respiratory symptoms such as coughing, wheezing, and shortness of breath.   You reduce your risk for cancers of the lung, mouth, throat, kidney, bladder, pancreas, stomach, and cervix. If you already have cancer, you increase the benefits of chemotherapy. You also reduce your risk for cancer returning or a second cancer from developing.   You reduce your risk for heart disease, blood clots, heart attack, and stroke.   You reduce your risk for lung infections, and diseases such as pneumonia, asthma, chronic bronchitis, and emphysema.  Your circulation improves. More oxygen can be delivered to your body. If you have diabetes, you lower your risk for complications, such as kidney, artery, and eye diseases. You also lower your risk for nerve damage. Nerve damage can lead to amputations, poor vision, and blindness.  You improve your body's ability to heal and to fight infections.  For more information and support to stop smoking:   [employer]  Phone: 9- 517 - [phone]  Web Address: www.[employer]  Weight Management   Why it is important to manage your weight:  Being overweight increases your risk of health conditions such as heart disease, high blood pressure, type 2 diabetes, and certain types of cancer. It can also increase your risk for osteoarthritis, sleep apnea, and other respiratory problems. Aim for a slow, steady weight loss. Even a small amount of weight loss can lower your risk of health problems.  How to lose weight safely:  A safe and healthy way to lose weight is to eat fewer calories and get regular exercise. You can lose up about 1 pound a week by decreasing the number of calories you eat by 500 calories each day.   Healthy meal plan for weight management:  A healthy meal plan includes a variety of foods, contains fewer calories, and helps you stay healthy. A healthy meal plan  includes the following:  Eat whole-grain foods more often.  A healthy meal plan should contain fiber. Fiber is the part of grains, fruits, and vegetables that is not broken down by your body. Whole-grain foods are healthy and provide extra fiber in your diet. Some examples of whole-grain foods are whole-wheat breads and pastas, oatmeal, brown rice, and bulgur.  Eat a variety of vegetables every day.  Include dark, leafy greens such as spinach, kale, lorena greens, and mustard greens. Eat yellow and orange vegetables such as carrots, sweet potatoes, and winter squash.   Eat a variety of fruits every day.  Choose fresh or canned fruit (canned in its own juice or light syrup) instead of juice. Fruit juice has very little or no fiber.  Eat low-fat dairy foods.  Drink fat-free (skim) milk or 1% milk. Eat fat-free yogurt and low-fat cottage cheese. Try low-fat cheeses such as mozzarella and other reduced-fat cheeses.  Choose meat and other protein foods that are low in fat.  Choose beans or other legumes such as split peas or lentils. Choose fish, skinless poultry (chicken or turkey), or lean cuts of red meat (beef or pork). Before you cook meat or poultry, cut off any visible fat.   Use less fat and oil.  Try baking foods instead of frying them. Add less fat, such as margarine, sour cream, regular salad dressing and mayonnaise to foods. Eat fewer high-fat foods. Some examples of high-fat foods include french fries, doughnuts, ice cream, and cakes.  Eat fewer sweets.  Limit foods and drinks that are high in sugar. This includes candy, cookies, regular soda, and sweetened drinks.  Exercise:  Exercise at least 30 minutes per day on most days of the week. Some examples of exercise include walking, biking, dancing, and swimming. You can also fit in more physical activity by taking the stairs instead of the elevator or parking farther away from stores. Ask your healthcare provider about the best exercise plan for you.      ©  Copyright [employer] 2018 Information is for End User's use only and may not be sold, redistributed or otherwise used for commercial purposes. All illustrations and images included in CareNotes® are the copyrighted property of [employer] or [employer]

## 2024-12-04 NOTE — ASSESSMENT & PLAN NOTE
Wt Readings from Last 3 Encounters:   12/04/24 77.1 kg (170 lb)   06/10/24 81.6 kg (180 lb)   10/13/23 80.7 kg (178 lb)   Weight has been stable no evidence of swelling.  No symptoms.

## 2024-12-08 DIAGNOSIS — I74.10 AORTIC THROMBUS (HCC): ICD-10-CM

## 2024-12-10 RX ORDER — APIXABAN 5 MG/1
5 TABLET, FILM COATED ORAL 2 TIMES DAILY
Qty: 180 TABLET | Refills: 1 | Status: SHIPPED | OUTPATIENT
Start: 2024-12-10

## 2025-01-06 DIAGNOSIS — K22.70 BARRETT'S ESOPHAGUS WITHOUT DYSPLASIA: ICD-10-CM

## 2025-01-06 DIAGNOSIS — R11.0 NAUSEA: ICD-10-CM

## 2025-01-06 DIAGNOSIS — K21.9 GASTROESOPHAGEAL REFLUX DISEASE, UNSPECIFIED WHETHER ESOPHAGITIS PRESENT: ICD-10-CM

## 2025-01-08 DIAGNOSIS — K22.70 BARRETT'S ESOPHAGUS WITHOUT DYSPLASIA: ICD-10-CM

## 2025-01-08 DIAGNOSIS — K21.9 GASTROESOPHAGEAL REFLUX DISEASE, UNSPECIFIED WHETHER ESOPHAGITIS PRESENT: ICD-10-CM

## 2025-01-08 DIAGNOSIS — R11.0 NAUSEA: ICD-10-CM

## 2025-01-08 RX ORDER — PANTOPRAZOLE SODIUM 40 MG/1
TABLET, DELAYED RELEASE ORAL
Qty: 60 TABLET | Refills: 0 | Status: SHIPPED | OUTPATIENT
Start: 2025-01-08 | End: 2025-01-09

## 2025-01-08 RX ORDER — FAMOTIDINE 40 MG/1
TABLET, FILM COATED ORAL
Qty: 30 TABLET | Refills: 0 | Status: SHIPPED | OUTPATIENT
Start: 2025-01-08 | End: 2025-01-09

## 2025-01-09 RX ORDER — FAMOTIDINE 40 MG/1
TABLET, FILM COATED ORAL
Qty: 30 TABLET | Refills: 0 | Status: SHIPPED | OUTPATIENT
Start: 2025-01-09

## 2025-01-09 RX ORDER — PANTOPRAZOLE SODIUM 40 MG/1
TABLET, DELAYED RELEASE ORAL
Qty: 60 TABLET | Refills: 0 | Status: SHIPPED | OUTPATIENT
Start: 2025-01-09

## 2025-01-23 ENCOUNTER — TELEPHONE (OUTPATIENT)
Age: 65
End: 2025-01-23

## 2025-01-23 DIAGNOSIS — E11.65 TYPE 2 DIABETES MELLITUS WITH HYPERGLYCEMIA, WITH LONG-TERM CURRENT USE OF INSULIN (HCC): Chronic | ICD-10-CM

## 2025-01-23 DIAGNOSIS — Z79.4 TYPE 2 DIABETES MELLITUS WITH HYPERGLYCEMIA, WITH LONG-TERM CURRENT USE OF INSULIN (HCC): Chronic | ICD-10-CM

## 2025-01-23 NOTE — TELEPHONE ENCOUNTER
Pt needs a follow up appt to get a refill on his dexcom sensors. Pt scheduled for next available in June. Pt is on the waitlist.     Pt is asking if he would be able to get sample of   Freestyle delgado 2 sensors.

## 2025-01-27 ENCOUNTER — TELEPHONE (OUTPATIENT)
Age: 65
End: 2025-01-27

## 2025-01-27 ENCOUNTER — OFFICE VISIT (OUTPATIENT)
Dept: ENDOCRINOLOGY | Facility: CLINIC | Age: 65
End: 2025-01-27
Payer: MEDICARE

## 2025-01-27 VITALS
OXYGEN SATURATION: 98 % | DIASTOLIC BLOOD PRESSURE: 76 MMHG | HEART RATE: 87 BPM | BODY MASS INDEX: 25.85 KG/M2 | SYSTOLIC BLOOD PRESSURE: 108 MMHG | HEIGHT: 68 IN

## 2025-01-27 DIAGNOSIS — E78.2 MIXED HYPERLIPIDEMIA: Chronic | ICD-10-CM

## 2025-01-27 DIAGNOSIS — Z79.4 TYPE 2 DIABETES MELLITUS WITH HYPERGLYCEMIA, WITH LONG-TERM CURRENT USE OF INSULIN (HCC): Primary | Chronic | ICD-10-CM

## 2025-01-27 DIAGNOSIS — I10 ESSENTIAL HYPERTENSION: Chronic | ICD-10-CM

## 2025-01-27 DIAGNOSIS — E11.65 TYPE 2 DIABETES MELLITUS WITH HYPERGLYCEMIA, WITH LONG-TERM CURRENT USE OF INSULIN (HCC): Primary | Chronic | ICD-10-CM

## 2025-01-27 PROCEDURE — 99214 OFFICE O/P EST MOD 30 MIN: CPT | Performed by: PHYSICIAN ASSISTANT

## 2025-01-27 PROCEDURE — 95251 CONT GLUC MNTR ANALYSIS I&R: CPT | Performed by: PHYSICIAN ASSISTANT

## 2025-01-27 NOTE — PATIENT INSTRUCTIONS
Hypoglycemia instructions   Ben Alvarez  2025  [id number]    Low Blood Sugar  INSULIN DOSAGE INSTRUCTIONS    Name: Ben Alvarez                        : 1960  MRN #: [id number]    Your Current Insulin  and dose is: Before Breakfast Before Lunch Before Evening Meal Bedtime     Humalog Insulin     9 units       9 units     Regular, Apidra, Humalog orNovolog Sliding Scale:   <80              151-200 +  + +    201-250 + 1 + +1 +   251-300 + 2 + +2 +   301-350 + 3 + +3 +   >350 + 4 + +4 +     Tresiba     30 units     Additional Instructions:   Please test your blood sugar:  _4_ Times per day.  X_ Before Breakfast                _ Alternate Testing  X_ Before Lunch                _ 2 Hours After  Meal  X_ Before Evening Meal               _ 3 a.m.  x_ Before Bedtime Snack     Call if your  blood sugar is less than _60_ or greater than _400__.    Today's Date: 2025   Hypoglycemia instructions   Ben Alvarez  2025  [id number]    Low Blood Sugar    Steps to treat low blood sugar.    1. Test blood sugar if you have symptoms of low blood sugar:   Low Blood Sugar Symptoms:  o Sweaty  o Dizzy  o Rapid heartbeat  o Shaky    o Bad mood  o Hungry      2. Treat blood sugar less than 70 with 15 grams of fast-acting carbohydrate:   Examples of 15 grams Fast-Acting Carbohydrate:  o 4 oz juice  o 4 oz regular soda  o 3-4 glucose tablets (chew)  o 3-4 hard candies (chew)              3.   Wait 15 minutes and test your blood sugar again           4. If blood sugar is less than 100, repeat steps 2-3.      5. When your blood sugar is 100 or more, eat a snack if it will be longer than one hour until your next meal. The snack should be 15 grams of carbohydrate and a protein:   Examples of snacks:  o ½ sandwich  o 6 crackers with cheese  o Piece of fruit with cheese or peanut butter  o 6 crackers with peanut butter

## 2025-01-27 NOTE — PROGRESS NOTES
Patient Progress Note      CC: DM      Referring Provider  No referring provider defined for this encounter.     History of Present Illness:   Ben Alvarez is a 64 y.o. male with a history of type 2 diabetes with long term use of insulin. Diabetes course has been stable. Complications of DM: neuropathy, right AKA, gastroparesis. Denies any issues with his current regimen. Home glucose monitoring: are performed regularly, 4 times a day.      Ben Alvarez   Device used  Home use     Indication   Type 2 Diabetes    More than 72 hours of data was reviewed. Report to be scanned to chart.     Date Range: 1/14-1/27    Analysis of data:   Average Glucose: 183 mg/dl  Coefficient of Variation: 27.2%  Time in Target Range: 50%   Time Above Range: 50%   Time Below Range: 0%     Interpretation of data: readings spike at varying times of the day likely due to inconsistent carbohydrate intake at meals.      Current regimen: Tresiba 30 units QHS, Humalog 9 units BID plus scale (1:50 over 200) (he using 7 units BID plus scale but he needs to be reminded of the scale), Jardiance 25 mg daily, Janumet  mg BID  compliant most of the time, denies any side effects from medications.  Injects in: abdomen. Rotates sites: Yes  Hypoglycemic episodes: No, rare  H/o of hypoglycemia causing hospitalization or Intervention such as glucagon injection or ambulance call : No  Hypoglycemia symptoms: jitteriness  Treatment of hypoglycemia: juice, glucose tablets     Medic alert tag: recommended: Yes     Diabetes education: No  Diet: 4-5 small meals per day. Timing of meals is predictable.   Diabetic diet compliance: noncompliant some of the time  Activity: Daily activity is predictable: Yes. No routine exercise.      Ophthamology: April 2024  Podiatry: April 2024. Dr. Mansfield     Has hypertension: on ACE inhibitor/ARB, compliant most of the time  Has hyperlipidemia: on statin   Thyroid disorders: No  History of pancreatitis:  No    Patient Active Problem List   Diagnosis    Chronic hepatitis C without hepatic coma (HCC)    Essential hypertension    Hyperlipidemia associated with type 2 diabetes mellitus  (HCC)    Methadone maintenance therapy patient (HCC)    Type 2 diabetes mellitus with diabetic polyneuropathy (HCC)    Bilateral lower extremity edema    Positive depression screening    Mononeuropathy, unspecified    Mixed hyperlipidemia    Proteinuria    Vitamin D deficiency    Nausea    Type 2 diabetes mellitus, with long-term current use of insulin (HCC)    Hematuria    Aortic thrombus (HCC)    Prolonged Q-T interval on ECG    Empyema lung (HCC)    S/P AKA (above knee amputation), right (HCC)    Cervical radiculopathy    Chronic bilateral low back pain    Above-knee amputation of right lower extremity (HCC)    Moderate protein-calorie malnutrition (HCC)    Chronic pain syndrome    Chronic hyponatremia    Depression    History of COVID-19    Pulmonary fibrosis (HCC)    Foot drop, left    PAD (peripheral artery disease) (HCC)    Diabetic neuropathy (HCC)    Ulcer of left foot, limited to breakdown of skin (HCC)    Heart failure, unspecified HF chronicity, unspecified heart failure type (HCC)    Unspecified adrenocortical insufficiency (HCC)    Hammer toe of left foot    Skin ulcer of toe of left foot, limited to breakdown of skin (HCC)    Skin ulcer of toe of left foot with fat layer exposed (HCC)    Gastroesophageal reflux disease    Iron deficiency    Osteomyelitis of toe of left foot (HCC)    History of amputation of left great toe (HCC)    CVA, old, hemiparesis (HCC)    Chronic constipation    Norman's esophagus without dysplasia    Diabetic foot (HCC)      Past Medical History:   Diagnosis Date    Arthritis     Diabetes mellitus (HCC)     DVT (deep venous thrombosis) (HCC)     GERD (gastroesophageal reflux disease)     History of COVID-19 2020    severe disease, intubated/ICU x 1 month    History of drug abuse (HCC)     heroin  abuse, clean since 2003    Hypercholesteremia     Hypertension     Ischemia of right lower extremity with suspected rhabdomyolysis 02/06/2021    Left Hydropneumothorax 02/10/2021    Methadone use     Pneumonia due to COVID-19 virus 01/11/2021    Subacute osteomyelitis of right femur (HCC) 09/13/2021    Vascular disorder of lower extremity (HCC)       Past Surgical History:   Procedure Laterality Date    AMPUTATION      AMPUTATION ABOVE KNEE (AKA) Right 01/14/2021    Procedure: AMPUTATION ABOVE KNEE (AKA);  Surgeon: Jaylyn Sawyer MD;  [facility];  Service: Vascular    AMPUTATION ABOVE KNEE (AKA) Right 01/18/2021    Procedure: AMPUTATION ABOVE KNEE (AKA) FORMALIZATION,  R AKA wound washout, wound closure;  Surgeon: Jaylyn Sawyer MD;  [facility];  Service: Vascular    ARTERIOGRAM Right 01/13/2021    Procedure: ARTERIOGRAM;  Surgeon: Andres Galicia DO;  [facility];  Service: Vascular    BRONCHOSCOPY      IR CHEST TUBE PLACEMENT  02/10/2021    IR LOWER EXTREMITY ANGIOGRAM  01/14/2021    IR LOWER EXTREMITY ANGIOGRAM  11/28/2022    OR AMPUTATION METATARSAL W/TOE SINGLE Left 10/9/2023    Procedure: AMPUTATION TOE, 2nd;  Surgeon: Anthony Ta DPM;  Location: [facility];  Service: Podiatry    OR AMPUTATION TOE METATARSOPHALANGEAL JOINT Left 01/20/2023    Procedure: AMPUTATION TOE- hallux;  Surgeon: Anthony Ta DPM;  Location: [facility];  Service: Podiatry    OR SLCTV CATHJ 3RD+ ORD SLCTV ABDL PEL/LXTR BRNCH Left 11/28/2022    Procedure: LEG AGRAM W/ INTERVENTION;  Surgeon: Jaylyn Sawyer MD;  Location[facility];  Service: Vascular    THROMBECTOMY W/ EMBOLECTOMY Right 01/13/2021    Procedure: EMBOLECTOMY/THROMBECTOMY LOWER EXTREMITY;  Surgeon: Andres Galicia DO;  [facility];  Service: Vascular    VASCULAR SURGERY        Family History   Problem Relation Age of Onset    Diabetes Mother     Hypertension Father     Leukemia Father     Acute  lymphoblastic leukemia Father     Cancer Sister      Social History     Tobacco Use    Smoking status: Every Day     Current packs/day: 1.00     Types: Cigarettes     Passive exposure: Current    Smokeless tobacco: Never    Tobacco comments:     Has patches   Substance Use Topics    Alcohol use: Not Currently     Allergies   Allergen Reactions    Varenicline Rash     Bad dreams         Current Outpatient Medications:     acetaminophen (TYLENOL) 325 mg tablet, Take 3 tablets (975 mg total) by mouth every 8 (eight) hours, Disp: , Rfl: 0    albuterol (PROVENTIL HFA,VENTOLIN HFA) 90 mcg/act inhaler, Inhale 2 puffs every 4 (four) hours as needed for wheezing, Disp: 18 g, Rfl: 3    apixaban (Eliquis) 5 mg, Take 1 tablet by mouth twice daily, Disp: 180 tablet, Rfl: 1    aspirin 81 mg chewable tablet, Chew 81 mg daily, Disp: , Rfl:     atorvastatin (LIPITOR) 20 mg tablet, Take 1 tablet by mouth once daily, Disp: 30 tablet, Rfl: 0    Blood Glucose Monitoring Suppl (OneTouch Verio) w/Device KIT, Use to test blood sugars 3 times daily, Disp: 1 kit, Rfl: 0    buPROPion (Wellbutrin SR) 100 mg 12 hr tablet, Take 1 tablet (100 mg total) by mouth 2 (two) times a day, Disp: 60 tablet, Rfl: 1    cholecalciferol (VITAMIN D3) 1,000 units tablet, Take 2 tablets (2,000 Units total) by mouth daily, Disp: 12 tablet, Rfl: 0    Continuous Blood Gluc  (FreeStyle Ke 2 [place]) JOSEPH, Use as directed, Disp: 1 each, Rfl: 0    Continuous Blood Gluc Sensor (FreeStyle Ke 2 Sensor) MISC, Change every 14 days, Disp: 2 each, Rfl: 2    Diclofenac Sodium (VOLTAREN) 1 %, Apply 4 g topically 4 (four) times a day as needed (as needed for pain), Disp: 50 g, Rfl: 0    DULoxetine (CYMBALTA) 60 mg delayed release capsule, Take 1 capsule by mouth once daily at bedtime, Disp: 90 capsule, Rfl: 0    Empagliflozin (Jardiance) 25 MG TABS, TAKE 1 TABLET BY MOUTH ONCE DAILY IN THE MORNING, Disp: 90 tablet, Rfl: 1    famotidine (PEPCID) 40 MG tablet, TAKE 1  "TABLET BY MOUTH ONCE DAILY AT BEDTIME AS NEEDED FOR  HEARTBURN, Disp: 30 tablet, Rfl: 0    ferrous sulfate 324 (65 Fe) mg, Take 1 tablet (324 mg total) by mouth daily before breakfast, Disp: 30 tablet, Rfl: 5    glucose blood test strip, Use 1 each 3 (three) times daily after meals Use as instructed, Disp: 270 each, Rfl: 3    insulin lispro (HumaLOG KwikPen) 100 units/mL injection pen, Inject 9 units with brunch and 9 units with dinner +scale, Disp: 15 mL, Rfl: 3    Insulin Pen Needle 31G X 5 MM MISC, Use daily Pt to inject 4 X daily, Disp: 100 each, Rfl: 5    Insulin Pen Needle 31G X 5 MM MISC, 30 units sq 2X daily, Disp: 100 each, Rfl: 3    Insulin Syringe-Needle U-100 (BD Veo Insulin Syringe U/F) 31G X 15/64\" 0.5 ML MISC, Inject under the skin 3 (three) times a day Use as directed, Disp: 300 each, Rfl: 5    Janumet  MG per tablet, TAKE 1 TABLET BY MOUTH TWICE DAILY WITH MEALS, Disp: 180 tablet, Rfl: 0    levocetirizine (XYZAL) 5 MG tablet, TAKE 1 TABLET BY MOUTH ONCE DAILY IN THE EVENING, Disp: 90 tablet, Rfl: 1    lidocaine (LMX) 4 % cream, Apply topically 4 (four) times a day as needed for mild pain, Disp: 30 g, Rfl: 0    lisinopril (ZESTRIL) 10 mg tablet, Take 1 tablet by mouth once daily, Disp: 90 tablet, Rfl: 1    magnesium oxide (MAG-OX) 400 mg tablet, Take 1 tablet (400 mg total) by mouth daily, Disp: 90 tablet, Rfl: 3    methadone (DOLOPHINE) 10 mg tablet, Take 70 mg by mouth daily,, Disp: , Rfl:     multivitamin-minerals (CENTRUM ADULTS) tablet, Take 1 tablet by mouth daily, Disp: , Rfl: 0    naloxone (NARCAN) 4 mg/0.1 mL nasal spray, Administer 1 spray into a nostril. If no response after 2-3 minutes, give another dose in the other nostril using a new spray., Disp: 1 each, Rfl: 1    nicotine (NICODERM CQ) 21 mg/24 hr TD 24 hr patch, Place 1 patch on the skin over 24 hours every 24 hours, Disp: 28 patch, Rfl: 0    pantoprazole (PROTONIX) 40 mg tablet, TAKE 1 TABLET BY MOUTH TWICE DAILY 30  MINUTES " " PRIOR  TO  A  MEAL, Disp: 60 tablet, Rfl: 0    polyethylene glycol (GLYCOLAX) 17 GM/SCOOP powder, Take 1/2-1 capful daily., Disp: 578 g, Rfl: 5    prochlorperazine (COMPAZINE) 5 mg tablet, Take 1 tablet (5 mg total) by mouth every 6 (six) hours as needed for nausea or vomiting, Disp: 45 tablet, Rfl: 5    silver sulfadiazine (SILVADENE,SSD) 1 % cream, Apply topically if needed (open wound), Disp: 400 g, Rfl: 3    traZODone (DESYREL) 50 mg tablet, Take 1 tablet (50 mg total) by mouth daily at bedtime as needed for sleep, Disp: 90 tablet, Rfl: 3    Tresiba FlexTouch 100 units/mL injection pen, INJECT 30 UNITS SUBCUTANEOUSLY ONCE DAILY WITH  DINNER, Disp: 15 mL, Rfl: 3    benzonatate (TESSALON) 200 MG capsule, Take 1 capsule (200 mg total) by mouth 3 (three) times a day as needed for cough (Patient not taking: Reported on 1/27/2025), Disp: 20 capsule, Rfl: 0    fluticasone (FLONASE) 50 mcg/act nasal spray, 1 spray into each nostril daily (Patient not taking: Reported on 12/4/2024), Disp: 16 g, Rfl: 2  Review of Systems   Constitutional:  Positive for fatigue. Negative for activity change, appetite change and unexpected weight change.   HENT:  Negative for trouble swallowing.    Eyes:  Negative for visual disturbance.   Respiratory:  Negative for shortness of breath.    Cardiovascular:  Negative for chest pain and palpitations.   Gastrointestinal:  Negative for constipation and diarrhea.   Endocrine: Negative for polydipsia and polyuria.   Musculoskeletal:  Positive for arthralgias.   Skin:  Negative for wound.   Neurological:  Positive for numbness.   Psychiatric/Behavioral: Negative.         Physical Exam:  Body mass index is 25.85 kg/m².  /76   Pulse 87   Ht 5' 8\" (1.727 m)   SpO2 98%   BMI 25.85 kg/m²    Wt Readings from Last 3 Encounters:   12/04/24 77.1 kg (170 lb)   06/10/24 81.6 kg (180 lb)   10/13/23 80.7 kg (178 lb)       Physical Exam  Vitals and nursing note reviewed.   Constitutional:       " Appearance: He is well-developed.   HENT:      Head: Normocephalic.   Eyes:      General: No scleral icterus.  Neck:      Thyroid: No thyromegaly.   Cardiovascular:      Rate and Rhythm: Normal rate and regular rhythm.      Pulses:           Radial pulses are 2+ on the right side and 2+ on the left side.      Heart sounds: No murmur heard.  Pulmonary:      Effort: Pulmonary effort is normal. No respiratory distress.      Breath sounds: Normal breath sounds. No wheezing.   Musculoskeletal:         General: Deformity (right BKA) present.      Cervical back: Neck supple.   Skin:     General: Skin is warm and dry.   Neurological:      Mental Status: He is alert.           Labs:   Component      Latest Ref Rng 3/18/2024 12/2/2024 12/4/2024   Sodium      135 - 147 mmol/L  139     Potassium      3.5 - 5.3 mmol/L  4.8     Chloride      96 - 108 mmol/L  102     Carbon Dioxide      21 - 32 mmol/L  28     ANION GAP      4 - 13 mmol/L  9     BUN      5 - 25 mg/dL  19     Creatinine      0.60 - 1.30 mg/dL  1.13     GLUCOSE, FASTING      65 - 99 mg/dL  170 (H)     Calcium      8.4 - 10.2 mg/dL  9.5     AST      13 - 39 U/L  18     ALT      7 - 52 U/L  20     ALK PHOS      34 - 104 U/L  111 (H)     Total Protein      6.4 - 8.4 g/dL  7.9     Albumin      3.5 - 5.0 g/dL  4.0     Total Bilirubin      0.20 - 1.00 mg/dL  0.38     GFR, Calculated      ml/min/1.73sq m  67     Cholesterol      See Comment mg/dL  127     Triglycerides      See Comment mg/dL  193 (H)     HDL      >=40 mg/dL  35 (L)     LDL Calculated      0 - 100 mg/dL  53     Non-HDL Cholesterol      mg/dl  92     Hemoglobin A1C      <=6.5  8.4 !   8.1 !    TSH 3RD GENERATON      0.450 - 4.500 uIU/mL  5.027 (H)     FREE T4      0.61 - 1.12 ng/dL  1.09        Legend:  ! Abnormal  (H) High  (L) Low      Plan:    Diagnoses and all orders for this visit:    Type 2 diabetes mellitus with hyperglycemia, with long-term current use of insulin (HCC)  HGA1C 8.1%.  Improved.  Treatment regimen: increase Humalog to 9 units BID with meals. Reviewed scale with patient. Continue current dose of Tresiba.   Discussed intensive insulin regimen does increase risk for hypoglycemia. Episodes of hypoglycemia can lead to permanent disability and death.  Discussed risks/complications associated with uncontrolled diabetes.    Advised to adhere to diabetic diet, and recommended staying active/exercising routinely as tolerated.  Keep carbohydrates consistent to limit blood glucose fluctuations.  Advised to call if blood sugars less than 70 mg/dl or over 300 mg/dl.   Check blood glucose 3+ times a day  Discussed symptoms and treatment of hypoglycemia.   Discussed use of CGM to collect additional blood glucose data to reveal trends and patterns that can be used to optimize treatment plan.   Recommended routine follow-up with podiatry and ophthalmology.   Call to download sensor in 2 weeks  Ordered blood work to complete prior to next visit.  -     Hemoglobin A1C; Future  -     Albumin / creatinine urine ratio; Future  -     Basic metabolic panel; Future    Essential hypertension  Blood pressure adequately controlled, continue current treatment     Mixed hyperlipidemia  LDL 53  On statin therapy   Managed by PCP        Discussed with the patient diagnosis and treatment and all questions fully answered. He will call me if any problems arise.    Counseled patient on diagnostic results, prognosis, risk and benefit of treatment options, instruction for management, importance of treatment compliance, risk factor reduction and impressions.      Annika Patel PA-C

## 2025-01-28 LAB
DME PARACHUTE DELIVERY DATE REQUESTED: NORMAL
DME PARACHUTE ITEM DESCRIPTION: NORMAL
DME PARACHUTE ORDER STATUS: NORMAL
DME PARACHUTE SUPPLIER NAME: NORMAL
DME PARACHUTE SUPPLIER PHONE: NORMAL

## 2025-01-29 DIAGNOSIS — I74.10 AORTIC THROMBUS (HCC): ICD-10-CM

## 2025-01-30 RX ORDER — LISINOPRIL 10 MG/1
10 TABLET ORAL DAILY
Qty: 90 TABLET | Refills: 1 | Status: SHIPPED | OUTPATIENT
Start: 2025-01-30

## 2025-02-10 DIAGNOSIS — E11.42 TYPE 2 DIABETES MELLITUS WITH DIABETIC POLYNEUROPATHY, WITH LONG-TERM CURRENT USE OF INSULIN (HCC): ICD-10-CM

## 2025-02-10 DIAGNOSIS — Z79.4 TYPE 2 DIABETES MELLITUS WITH DIABETIC POLYNEUROPATHY, WITH LONG-TERM CURRENT USE OF INSULIN (HCC): ICD-10-CM

## 2025-02-11 ENCOUNTER — TELEPHONE (OUTPATIENT)
Age: 65
End: 2025-02-11

## 2025-02-11 DIAGNOSIS — K22.70 BARRETT'S ESOPHAGUS WITHOUT DYSPLASIA: ICD-10-CM

## 2025-02-11 DIAGNOSIS — R11.0 NAUSEA: ICD-10-CM

## 2025-02-11 DIAGNOSIS — K21.9 GASTROESOPHAGEAL REFLUX DISEASE, UNSPECIFIED WHETHER ESOPHAGITIS PRESENT: ICD-10-CM

## 2025-02-11 RX ORDER — EMPAGLIFLOZIN 25 MG/1
25 TABLET, FILM COATED ORAL EVERY MORNING
Qty: 90 TABLET | Refills: 1 | Status: SHIPPED | OUTPATIENT
Start: 2025-02-11 | End: 2025-02-14

## 2025-02-11 NOTE — TELEPHONE ENCOUNTER
Pt's nephew, Nolan, calling to request refills for pantoprazole 40 mg BID and famotidine 40 mg nightly. [id number] confirmed. Pt is completely out of both medications.    Please return call to Nolan when prescriptions have been sent.

## 2025-02-12 RX ORDER — PANTOPRAZOLE SODIUM 40 MG/1
TABLET, DELAYED RELEASE ORAL
Qty: 60 TABLET | Refills: 3 | Status: SHIPPED | OUTPATIENT
Start: 2025-02-12

## 2025-02-12 RX ORDER — FAMOTIDINE 40 MG/1
40 TABLET, FILM COATED ORAL 2 TIMES DAILY PRN
Qty: 30 TABLET | Refills: 3 | Status: SHIPPED | OUTPATIENT
Start: 2025-02-12

## 2025-02-13 ENCOUNTER — TELEPHONE (OUTPATIENT)
Age: 65
End: 2025-02-13

## 2025-02-13 DIAGNOSIS — Z79.4 TYPE 2 DIABETES MELLITUS WITH DIABETIC POLYNEUROPATHY, WITH LONG-TERM CURRENT USE OF INSULIN (HCC): Primary | ICD-10-CM

## 2025-02-13 DIAGNOSIS — E11.42 TYPE 2 DIABETES MELLITUS WITH DIABETIC POLYNEUROPATHY, WITH LONG-TERM CURRENT USE OF INSULIN (HCC): Primary | ICD-10-CM

## 2025-02-13 NOTE — TELEPHONE ENCOUNTER
Patient called stating insurance no longer covers Jardiance and he would need to pay over $700 for prescription. Asking for an alternative medication. Please advise, thank you.

## 2025-02-14 RX ORDER — DAPAGLIFLOZIN 10 MG/1
10 TABLET, FILM COATED ORAL DAILY
Qty: 90 TABLET | Refills: 1 | Status: SHIPPED | OUTPATIENT
Start: 2025-02-14

## 2025-02-19 DIAGNOSIS — E11.42 TYPE 2 DIABETES MELLITUS WITH DIABETIC POLYNEUROPATHY, WITH LONG-TERM CURRENT USE OF INSULIN (HCC): ICD-10-CM

## 2025-02-19 DIAGNOSIS — Z79.4 TYPE 2 DIABETES MELLITUS WITH DIABETIC POLYNEUROPATHY, WITH LONG-TERM CURRENT USE OF INSULIN (HCC): ICD-10-CM

## 2025-02-19 RX ORDER — SITAGLIPTIN AND METFORMIN HYDROCHLORIDE 1000; 50 MG/1; MG/1
1 TABLET, FILM COATED ORAL 2 TIMES DAILY WITH MEALS
Qty: 180 TABLET | Refills: 1 | Status: SHIPPED | OUTPATIENT
Start: 2025-02-19

## 2025-03-05 DIAGNOSIS — S78.111A ABOVE-KNEE AMPUTATION OF RIGHT LOWER EXTREMITY (HCC): ICD-10-CM

## 2025-03-05 DIAGNOSIS — E11.42 TYPE 2 DIABETES MELLITUS WITH DIABETIC POLYNEUROPATHY, WITH LONG-TERM CURRENT USE OF INSULIN (HCC): ICD-10-CM

## 2025-03-05 DIAGNOSIS — G89.4 CHRONIC PAIN SYNDROME: ICD-10-CM

## 2025-03-05 DIAGNOSIS — Z79.4 TYPE 2 DIABETES MELLITUS WITH DIABETIC POLYNEUROPATHY, WITH LONG-TERM CURRENT USE OF INSULIN (HCC): ICD-10-CM

## 2025-03-06 RX ORDER — DULOXETIN HYDROCHLORIDE 60 MG/1
60 CAPSULE, DELAYED RELEASE ORAL
Qty: 90 CAPSULE | Refills: 0 | Status: SHIPPED | OUTPATIENT
Start: 2025-03-06

## 2025-03-14 DIAGNOSIS — J30.2 SEASONAL ALLERGIES: ICD-10-CM

## 2025-03-15 RX ORDER — LEVOCETIRIZINE DIHYDROCHLORIDE 5 MG/1
5 TABLET, FILM COATED ORAL EVERY EVENING
Qty: 90 TABLET | Refills: 0 | Status: SHIPPED | OUTPATIENT
Start: 2025-03-15

## 2025-03-20 ENCOUNTER — TELEPHONE (OUTPATIENT)
Age: 65
End: 2025-03-20

## 2025-03-20 NOTE — TELEPHONE ENCOUNTER
Patient states he tried to reorder sensors and usually it cost $50. States when he called he was told he would have to pay $150. Patient states he can't afford this. Asking if office has samples. Called office and Irlanda states no samples available.  Also, states Farxiga was sent to pharmacy and it is only $5 less than Jardiance and he can't afford this either. Please advise, thank you.

## 2025-03-24 NOTE — TELEPHONE ENCOUNTER
I approve the request for sensors through [employer].    As for the Farxiga, if it is too expensive then we will not use it and same with the Jardiance..  I will discontinue it from his medication list.  We will need to see how his blood glucose levels are without the Jardiance.  He should download CGM 2 weeks after he has stopped using the Jardiance to see what other adjustments we may need to make to his treatment.

## 2025-03-26 NOTE — TELEPHONE ENCOUNTER
Spoke with patient, he stated he hasn't taken farxiga or jardiance already for over a month. He is going to bring his Ke for download on Friday    [facility] Speech Language Pathology Department    Patient Name:  Bi Whitley Jr.   MRN:  [id number]    SLP orders received, chart reviewed, and nursing consulted.  Pt admitted to ICU s/p cerebral angiogram after being transferred from an outside facility s/p IV thrombolytics for interventional neurology services after presenting with slurred speech, RUE weakness.  Attempted to complete clinical swallow evaluation, however pt restless and confused.  PO intake unsafe at this time.  Will continue to follow and tx as appropriate.

## 2025-06-01 DIAGNOSIS — R11.0 NAUSEA: ICD-10-CM

## 2025-06-01 DIAGNOSIS — K21.9 GASTROESOPHAGEAL REFLUX DISEASE, UNSPECIFIED WHETHER ESOPHAGITIS PRESENT: ICD-10-CM

## 2025-06-01 DIAGNOSIS — K22.70 BARRETT'S ESOPHAGUS WITHOUT DYSPLASIA: ICD-10-CM

## 2025-06-02 RX ORDER — PANTOPRAZOLE SODIUM 40 MG/1
TABLET, DELAYED RELEASE ORAL
Qty: 60 TABLET | Refills: 0 | Status: SHIPPED | OUTPATIENT
Start: 2025-06-02

## 2025-06-09 DIAGNOSIS — Z79.4 TYPE 2 DIABETES MELLITUS WITH DIABETIC POLYNEUROPATHY, WITH LONG-TERM CURRENT USE OF INSULIN (HCC): ICD-10-CM

## 2025-06-09 DIAGNOSIS — G89.4 CHRONIC PAIN SYNDROME: ICD-10-CM

## 2025-06-09 DIAGNOSIS — E11.42 TYPE 2 DIABETES MELLITUS WITH DIABETIC POLYNEUROPATHY, WITH LONG-TERM CURRENT USE OF INSULIN (HCC): ICD-10-CM

## 2025-06-09 DIAGNOSIS — S78.111A ABOVE-KNEE AMPUTATION OF RIGHT LOWER EXTREMITY (HCC): ICD-10-CM

## 2025-06-09 RX ORDER — DULOXETIN HYDROCHLORIDE 60 MG/1
60 CAPSULE, DELAYED RELEASE ORAL
Qty: 90 CAPSULE | Refills: 1 | Status: SHIPPED | OUTPATIENT
Start: 2025-06-09 | End: 2025-06-13 | Stop reason: SDUPTHER

## 2025-06-13 ENCOUNTER — TELEPHONE (OUTPATIENT)
Age: 65
End: 2025-06-13

## 2025-06-13 DIAGNOSIS — G89.4 CHRONIC PAIN SYNDROME: ICD-10-CM

## 2025-06-13 DIAGNOSIS — Z79.4 TYPE 2 DIABETES MELLITUS WITH DIABETIC POLYNEUROPATHY, WITH LONG-TERM CURRENT USE OF INSULIN (HCC): ICD-10-CM

## 2025-06-13 DIAGNOSIS — I74.10 AORTIC THROMBUS (HCC): ICD-10-CM

## 2025-06-13 DIAGNOSIS — E11.42 TYPE 2 DIABETES MELLITUS WITH DIABETIC POLYNEUROPATHY, WITH LONG-TERM CURRENT USE OF INSULIN (HCC): ICD-10-CM

## 2025-06-13 DIAGNOSIS — S78.111A ABOVE-KNEE AMPUTATION OF RIGHT LOWER EXTREMITY (HCC): ICD-10-CM

## 2025-06-13 NOTE — TELEPHONE ENCOUNTER
Patients nephew called to cancel his appointment on 6/17, he states there is an issue with his medicare and he will not have insurance for some time. He states he will call and get him scheduled once insurance is reinstated. He was told it would only be about 5 days.  FYI

## 2025-06-13 NOTE — TELEPHONE ENCOUNTER
Patient's nephew is calling again to follow on previous request. He is asking if coumadin can be sent in for his uncle instead. He is completely out of medication.

## 2025-06-13 NOTE — TELEPHONE ENCOUNTER
Patients Cymbalta needs to be resent to pharmacy for some reason pharmacy states it did not go through

## 2025-06-13 NOTE — PROGRESS NOTES
Spoke with patients nephew. His insurance lapsed but they worked with medicare and his plan should be reinstated in 5 days. Therefore, he will be able to afford the eliquis long term. With GoodRX coupon, for 14 tablets at [facility] it is $152.85. States that they can afford this for now and then as soon as they hear from Medicare, will call office with update.

## 2025-06-13 NOTE — TELEPHONE ENCOUNTER
Patient nephew is asking for some help he stated that the Eliquis is $2000. Patient is having issues with insurance .Can coumadin be sent. Nephew is requesting a call from provider. Just needed something to last for a few days, until insurance is reinstated.

## 2025-06-16 RX ORDER — DULOXETIN HYDROCHLORIDE 60 MG/1
60 CAPSULE, DELAYED RELEASE ORAL
Qty: 90 CAPSULE | Refills: 1 | Status: SHIPPED | OUTPATIENT
Start: 2025-06-16

## 2025-06-25 ENCOUNTER — TELEPHONE (OUTPATIENT)
Age: 65
End: 2025-06-25

## 2025-06-25 DIAGNOSIS — Z59.71 INSURANCE COVERAGE PROBLEMS: Primary | ICD-10-CM

## 2025-06-25 NOTE — TELEPHONE ENCOUNTER
Patients nephew Nolan calling back, stating they spoke with Medicare and his insurance will not be reinstated.  Therefore if Tere can recommenced a  or someone that could help out?   And if there's a cheaper medication than Jovani Francisco would like to talk with Tere

## 2025-06-26 DIAGNOSIS — I74.10 AORTIC THROMBUS (HCC): ICD-10-CM

## 2025-06-26 NOTE — TELEPHONE ENCOUNTER
Left message for patient's nephew Nolan, please relay provider's message & forward response to the office.

## 2025-06-26 NOTE — TELEPHONE ENCOUNTER
Patient's nephew called and was notified. He wants to know if at least 1 week's worth of the eliquis can be sent to [facility] pharmacy, even if it has to be paid out of pocket since patient only has one more day's worth of medication left.  Patient's nephew  would also like to know what the next steps are with case management.    Please advise

## 2025-06-26 NOTE — TELEPHONE ENCOUNTER
I will call and talk to them about the case management referral.    Requesting 1 week supply of Eliquis sent to [facility].

## 2025-06-27 ENCOUNTER — PATIENT OUTREACH (OUTPATIENT)
Dept: CASE MANAGEMENT | Facility: OTHER | Age: 65
End: 2025-06-27

## 2025-06-27 NOTE — PROGRESS NOTES
OPCM SW received referral for assistance with Medication costs.  OPCM SW reviewed patient's chart and reached out regarding the above.  OPCM SW discussed with Nephew and completed assessment.  Per our discussion, patient appears to likely qualify for Medicare's Extra Help program.  OPC SW added [facility] Support Services to assist with application.  Family appreciative.

## 2025-07-03 ENCOUNTER — PATIENT OUTREACH (OUTPATIENT)
Dept: CASE MANAGEMENT | Facility: OTHER | Age: 65
End: 2025-07-03

## 2025-07-03 NOTE — PROGRESS NOTES
JOO received a referral from OFELIA Washburn to assist patient with applying for Medicare Part D Extra Help.    JOO contacted Ben to discuss the referral and he's agreeable to services. He asks to have his nephew, Nolan, assist with the application.  Nolan assisted this writer with completing the  part D extra help application online. Application was submitted and will be processed.   (Application# [id number])    No other questions or needs currently.     Will outreach next week for a status update and assess for further needs.

## 2025-07-10 DIAGNOSIS — R11.0 NAUSEA: ICD-10-CM

## 2025-07-10 DIAGNOSIS — K21.9 GASTROESOPHAGEAL REFLUX DISEASE, UNSPECIFIED WHETHER ESOPHAGITIS PRESENT: ICD-10-CM

## 2025-07-10 DIAGNOSIS — K22.70 BARRETT'S ESOPHAGUS WITHOUT DYSPLASIA: ICD-10-CM

## 2025-07-10 DIAGNOSIS — I74.10 AORTIC THROMBUS (HCC): ICD-10-CM

## 2025-07-10 RX ORDER — PANTOPRAZOLE SODIUM 40 MG/1
TABLET, DELAYED RELEASE ORAL
Qty: 60 TABLET | Refills: 0 | Status: SHIPPED | OUTPATIENT
Start: 2025-07-10

## 2025-07-10 RX ORDER — FAMOTIDINE 40 MG/1
40 TABLET, FILM COATED ORAL 2 TIMES DAILY PRN
Qty: 30 TABLET | Refills: 1 | Status: SHIPPED | OUTPATIENT
Start: 2025-07-10

## 2025-07-11 ENCOUNTER — PATIENT OUTREACH (OUTPATIENT)
Dept: CASE MANAGEMENT | Facility: OTHER | Age: 65
End: 2025-07-11

## 2025-07-11 NOTE — PROGRESS NOTES
CMOC contacted Ben and s/w his nephew Nolan for a status update on the  part D extra help application.     Nolan denies any correspondence at time of outreach and they will continue to await determination. If no correspondence by next outreach will recommend they contact [facility] or login to Ben' [email or web address] account for an update.    No other questions or concerns currently.     Will outreach next week for a status update.

## 2025-07-15 ENCOUNTER — PATIENT OUTREACH (OUTPATIENT)
Dept: CASE MANAGEMENT | Facility: OTHER | Age: 65
End: 2025-07-15

## 2025-07-17 ENCOUNTER — PATIENT OUTREACH (OUTPATIENT)
Dept: CASE MANAGEMENT | Facility: OTHER | Age: 65
End: 2025-07-17

## 2025-07-17 NOTE — PROGRESS NOTES
Care Management  outreached patient to follow-up on resources previously provided.  Outcome regarding these resources was Pending Determination.    Patient was referred on [employer] to [place] [employer] for [place]    Care Management  provided update to Care Manager and scheduled Next Outreach with patient to follow-up on Insurance.

## 2025-07-18 ENCOUNTER — TELEPHONE (OUTPATIENT)
Age: 65
End: 2025-07-18

## 2025-07-18 ENCOUNTER — TELEPHONE (OUTPATIENT)
Dept: FAMILY MEDICINE CLINIC | Facility: CLINIC | Age: 65
End: 2025-07-18

## 2025-07-18 DIAGNOSIS — Z79.4 TYPE 2 DIABETES MELLITUS WITH DIABETIC POLYNEUROPATHY, WITH LONG-TERM CURRENT USE OF INSULIN (HCC): ICD-10-CM

## 2025-07-18 DIAGNOSIS — E11.42 TYPE 2 DIABETES MELLITUS WITH DIABETIC POLYNEUROPATHY, WITH LONG-TERM CURRENT USE OF INSULIN (HCC): ICD-10-CM

## 2025-07-18 NOTE — TELEPHONE ENCOUNTER
Patient's nephew Nolan called, he is asking if a script for lispro insulin can be sent in for his uncle. He can't afford the pens at this time. Nolan was warm transferred to [facility] for further assistance.

## 2025-07-18 NOTE — TELEPHONE ENCOUNTER
Patient is unable to afford the insulin lispro injection pen   can we send over  the vials ( he has needles at home )     the vials are cheaper           Patient is out of medication after today       Patient of Tere Brown     Pharmacy is [facility] [place]

## 2025-07-21 ENCOUNTER — TELEPHONE (OUTPATIENT)
Age: 65
End: 2025-07-21

## 2025-07-21 DIAGNOSIS — E11.42 TYPE 2 DIABETES MELLITUS WITH DIABETIC POLYNEUROPATHY, WITH LONG-TERM CURRENT USE OF INSULIN (HCC): ICD-10-CM

## 2025-07-21 DIAGNOSIS — Z79.4 TYPE 2 DIABETES MELLITUS WITH DIABETIC POLYNEUROPATHY, WITH LONG-TERM CURRENT USE OF INSULIN (HCC): ICD-10-CM

## 2025-07-21 DIAGNOSIS — E11.65 TYPE 2 DIABETES MELLITUS WITH HYPERGLYCEMIA, WITH LONG-TERM CURRENT USE OF INSULIN (HCC): Primary | ICD-10-CM

## 2025-07-21 DIAGNOSIS — Z79.4 TYPE 2 DIABETES MELLITUS WITH HYPERGLYCEMIA, WITH LONG-TERM CURRENT USE OF INSULIN (HCC): Primary | ICD-10-CM

## 2025-07-21 RX ORDER — INSULIN LISPRO 100 [IU]/ML
INJECTION, SUSPENSION SUBCUTANEOUS
Qty: 6 ML | Refills: 1 | Status: CANCELLED | OUTPATIENT
Start: 2025-07-21

## 2025-07-21 RX ORDER — INSULIN LISPRO 100 [IU]/ML
INJECTION, SOLUTION INTRAVENOUS; SUBCUTANEOUS
Qty: 30 ML | Refills: 1 | Status: SHIPPED | OUTPATIENT
Start: 2025-07-21

## 2025-07-21 RX ORDER — INSULIN LISPRO 100 [IU]/ML
INJECTION, SUSPENSION SUBCUTANEOUS
Status: CANCELLED | OUTPATIENT
Start: 2025-07-21

## 2025-07-22 ENCOUNTER — TELEPHONE (OUTPATIENT)
Age: 65
End: 2025-07-22

## 2025-07-22 RX ORDER — INSULIN LISPRO 100 [IU]/ML
INJECTION, SOLUTION INTRAVENOUS; SUBCUTANEOUS
Qty: 15 ML | Refills: 0 | Status: SHIPPED | OUTPATIENT
Start: 2025-07-22

## 2025-07-22 NOTE — TELEPHONE ENCOUNTER
Jennifer called to confirm that insulin prescription was sent. Advised that this was taken care of yesterday afternoon and to call the pharmacy. He will call back if he has any trouble filling the prescription.  
Patients nephew Nolan called stating they are having issues with medicare and they are paying OOP for medications right now. Nolan is requesting a script for Insulin Lispro VIALS be sent to [facility] in [place] as this will be cheaper. They have syringes at home. He needs the medication sent today.  Please advise   
Please make prescription ready for Humalog vials, 9 units with brunch and 9 units with dinner plus scale  
Please review   
Please review if this is correct for vials   
Provider sent request.  
Rita calling asking if insulin can be sent ASAKIMMY Contreras is completely out of insulin.   
yes

## 2025-07-22 NOTE — TELEPHONE ENCOUNTER
Phone call from patient's nephew Nolan . He is trying to get in touch with [employer]  in regards to his drug plan getting re instated. He is asking for them to call him at [phone].

## 2025-07-23 ENCOUNTER — PATIENT OUTREACH (OUTPATIENT)
Dept: CASE MANAGEMENT | Facility: OTHER | Age: 65
End: 2025-07-23

## 2025-07-23 NOTE — PROGRESS NOTES
Care Management  outreached patient to follow-up on resources previously provided ([employer]).  Outcome regarding these resources was Other.    Nolan, Ben' nephew, denies receiving [employer] information from last outreach. Confirmed text number and resent [employer] information.  Offered 3-way call to contact [employer], Nolan declined stating he will contact them himself.     Care Management  provided update to Care Manager and scheduled Next Outreach with patient to follow-up on Insurance Coverage.

## 2025-07-24 DIAGNOSIS — I74.10 AORTIC THROMBUS (HCC): ICD-10-CM

## 2025-07-24 NOTE — TELEPHONE ENCOUNTER
Jennifer requests that prescription for another 14 tablets of Eliquis is sent to [facility].    He states PCP has been working with their family, sending prescriptions for short term supplies of meds, as patient does not currently have active prescription plan, and family is paying out of pocket for his medication until the insurance issue is resolved.    Jennifer requests return call at [phone] with update when prescription has been sent, as [facility] does not notify them of prescription.

## 2025-07-25 ENCOUNTER — PROCEDURE VISIT (OUTPATIENT)
Dept: PODIATRY | Facility: CLINIC | Age: 65
End: 2025-07-25
Payer: MEDICARE

## 2025-07-25 VITALS — WEIGHT: 170 LBS | BODY MASS INDEX: 25.76 KG/M2 | HEIGHT: 68 IN

## 2025-07-25 DIAGNOSIS — B35.1 ONYCHOMYCOSIS: Primary | ICD-10-CM

## 2025-07-25 DIAGNOSIS — E11.49 OTHER DIABETIC NEUROLOGICAL COMPLICATION ASSOCIATED WITH TYPE 2 DIABETES MELLITUS (HCC): ICD-10-CM

## 2025-07-25 PROCEDURE — RECHECK: Performed by: PODIATRIST

## 2025-07-25 PROCEDURE — 11720 DEBRIDE NAIL 1-5: CPT | Performed by: PODIATRIST

## 2025-07-25 NOTE — TELEPHONE ENCOUNTER
Nolan, nephew of pt, called in following up if medication: apixaban (Eliquis) 5 mg was sent to [facility] pharmacy?    States he hasn't received any notification from [facility] or the office.     Reviewed chart & confirmed medication was sent this morning as requested.     [facility]

## 2025-07-25 NOTE — PROGRESS NOTES
Ben Alvarez  1960  AT RISK FOOT CARE    1. Onychomycosis        2. 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  Patient has no acute concerns today.  Patient has significant lower extremity risk due to previous amputation.    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 Using nail nipper, linda, and curette, nails were sharply debrided, reduced in thickness and length. Devitalized nail tissue and fungal debris excised and removed. Patient tolerated well.        Discussed proper shoe gear, daily inspections of feet, and general foot health with patient. Patient has Q7  findings and is recommended for at risk foot care every 9-10 weeks.      Procedure: All mycotic toenails were reduced and debrided in length, width, and girth using a nail nipper and dremel.  All hyperkeratotic skin lesion(s) were sharply pared with a scalpel with no bleeding or evidence of ulceration.  Patient tolerated procedure(s) well without complications.

## 2025-07-30 ENCOUNTER — PATIENT OUTREACH (OUTPATIENT)
Dept: CASE MANAGEMENT | Facility: OTHER | Age: 65
End: 2025-07-30

## 2025-08-07 DIAGNOSIS — I74.10 AORTIC THROMBUS (HCC): ICD-10-CM

## 2025-08-07 DIAGNOSIS — K21.9 GASTROESOPHAGEAL REFLUX DISEASE, UNSPECIFIED WHETHER ESOPHAGITIS PRESENT: ICD-10-CM

## 2025-08-07 DIAGNOSIS — K22.70 BARRETT'S ESOPHAGUS WITHOUT DYSPLASIA: ICD-10-CM

## 2025-08-07 DIAGNOSIS — R11.0 NAUSEA: ICD-10-CM

## 2025-08-07 RX ORDER — PANTOPRAZOLE SODIUM 40 MG/1
TABLET, DELAYED RELEASE ORAL
Qty: 60 TABLET | Refills: 5 | Status: SHIPPED | OUTPATIENT
Start: 2025-08-07

## 2025-08-07 RX ORDER — FAMOTIDINE 40 MG/1
40 TABLET, FILM COATED ORAL 2 TIMES DAILY PRN
Qty: 30 TABLET | Refills: 5 | Status: SHIPPED | OUTPATIENT
Start: 2025-08-07

## 2025-08-13 ENCOUNTER — PATIENT OUTREACH (OUTPATIENT)
Dept: CASE MANAGEMENT | Facility: OTHER | Age: 65
End: 2025-08-13

## 2025-08-15 ENCOUNTER — PATIENT OUTREACH (OUTPATIENT)
Dept: CASE MANAGEMENT | Facility: OTHER | Age: 65
End: 2025-08-15

## 2025-08-22 ENCOUNTER — PATIENT OUTREACH (OUTPATIENT)
Dept: CASE MANAGEMENT | Facility: OTHER | Age: 65
End: 2025-08-22

## 2025-08-22 ENCOUNTER — TELEPHONE (OUTPATIENT)
Age: 65
End: 2025-08-22

## 2025-08-22 DIAGNOSIS — I74.10 AORTIC THROMBUS (HCC): ICD-10-CM

## (undated) DEVICE — COBAN 4 IN STERILE

## (undated) DEVICE — DILATOR: Brand: COOK

## (undated) DEVICE — SUT SILK 0 SH 30 IN K834H

## (undated) DEVICE — FLEXCIL HIGH PRESSURE CONTRAST INJECTION LINE: Brand: NAMIC

## (undated) DEVICE — SUT MONOCRYL 4-0 PS-2 18 IN Y496G

## (undated) DEVICE — 2108 SERIES SAGITTAL BLADE (18.6 X 0.64 X 61.1MM)

## (undated) DEVICE — ACE WRAP 4 IN UNSTERILE

## (undated) DEVICE — KERLIX BANDAGE ROLL: Brand: KERLIX

## (undated) DEVICE — 3M™ TEGADERM™ TRANSPARENT FILM DRESSING FRAME STYLE, 1626W, 4 IN X 4-3/4 IN (10 CM X 12 CM), 50/CT 4CT/CASE: Brand: 3M™ TEGADERM™

## (undated) DEVICE — UNIVERSAL MAJOR EXTREMITY,KIT: Brand: CARDINAL HEALTH

## (undated) DEVICE — GLOVE SRG BIOGEL ECLIPSE 6

## (undated) DEVICE — INFUSER BAG 500ML

## (undated) DEVICE — LIGACLIP MCA MULTIPLE CLIP APPLIERS, 20 MEDIUM CLIPS: Brand: LIGACLIP

## (undated) DEVICE — DRAPE SURGIKIT SADDLE BAG

## (undated) DEVICE — MICROPUNCTURE 501

## (undated) DEVICE — SUT VICRYL 2-0 CT-1 27 IN J259H

## (undated) DEVICE — GLOVE SRG LF STRL BGL SKNSNS 7 PF

## (undated) DEVICE — PAD GROUNDING ADULT

## (undated) DEVICE — 1200CC GUARDIAN II: Brand: GUARDIAN

## (undated) DEVICE — CUFF TOURNIQUET 24 X 4 IN QUICK CONNECT DISP 1BLA

## (undated) DEVICE — PLUMEPEN PRO 10FT

## (undated) DEVICE — PETRI DISH STERILE

## (undated) DEVICE — SUT VICRYL 0 CT-1 27 IN J260H

## (undated) DEVICE — RADIFOCUS TORQUE DEVICE MULTI-TORQUE VISE: Brand: RADIFOCUS TORQUE DEVICE

## (undated) DEVICE — BETHLEHEM UNIVERSAL  MIONR EXT: Brand: CARDINAL HEALTH

## (undated) DEVICE — INTENDED FOR TISSUE SEPARATION, AND OTHER PROCEDURES THAT REQUIRE A SHARP SURGICAL BLADE TO PUNCTURE OR CUT.: Brand: BARD-PARKER ® CARBON RIB-BACK BLADES

## (undated) DEVICE — DRESSING MEPILEX AG BORDER 4 X 4 IN

## (undated) DEVICE — CATH DIAG 5FR .035 65CM 6S OMMI-FLUSH

## (undated) DEVICE — INTENDED FOR TISSUE SEPARATION, AND OTHER PROCEDURES THAT REQUIRE A SHARP SURGICAL BLADE TO PUNCTURE OR CUT.: Brand: BARD-PARKER SAFETY BLADES SIZE 10, STERILE

## (undated) DEVICE — PRESTO™ INFLATION DEVICE: Brand: PRESTO

## (undated) DEVICE — HALF SHEET: Brand: CONVERTORS

## (undated) DEVICE — STERILE ICS CARDIOVASCULAR PK: Brand: CARDINAL HEALTH

## (undated) DEVICE — LIGACLIP MCA MULTIPLE CLIP APPLIERS, 20 SMALL CLIPS: Brand: LIGACLIP

## (undated) DEVICE — BULB SYRINGE,IRRIGATION WITH PROTECTIVE CAP: Brand: DOVER

## (undated) DEVICE — PADDING CAST 4 IN  COTTON STRL

## (undated) DEVICE — GLOVE SRG BIOGEL 7.5

## (undated) DEVICE — ADHESIVE SKIN HIGH VISCOSITY EXOFIN 1ML

## (undated) DEVICE — TOWEL SURG XR DETECT GREEN STRL RFD

## (undated) DEVICE — STOPCOCK 4 WAY LL HIGH PRESSURE

## (undated) DEVICE — GLOVE INDICATOR PI UNDERGLOVE SZ 8 BLUE

## (undated) DEVICE — PENCIL ELECTROSURG E-Z CLEAN -0035H

## (undated) DEVICE — INTENDED FOR TISSUE SEPARATION, AND OTHER PROCEDURES THAT REQUIRE A SHARP SURGICAL BLADE TO PUNCTURE OR CUT.: Brand: BARD-PARKER SAFETY BLADES SIZE 15, STERILE

## (undated) DEVICE — FOGARTY ARTERIAL EMBOLECTOMY CATHETER 3F 80CM: Brand: FOGARTY

## (undated) DEVICE — STOCKINETTE REGULAR

## (undated) DEVICE — SUT SILK 3-0 18 IN A184H

## (undated) DEVICE — GLOVE INDICATOR PI UNDERGLOVE SZ 7 BLUE

## (undated) DEVICE — COVER PROBE INTRAOPERATIVE 6 X 96 IN

## (undated) DEVICE — SNAP KOVER: Brand: UNBRANDED

## (undated) DEVICE — DRAPE SHEET X-LG

## (undated) DEVICE — OCCLUSIVE GAUZE STRIP,3% BISMUTH TRIBROMOPHENATE IN PETROLATUM BLEND: Brand: XEROFORM

## (undated) DEVICE — GUIDEWIRE AMPLATZ .035 180CM 6CM ST SS

## (undated) DEVICE — DESTINATION PERIPHERAL GUIDING SHEATH: Brand: DESTINATION

## (undated) DEVICE — RADIFOCUS GLIDEWIRE: Brand: GLIDEWIRE

## (undated) DEVICE — NEEDLE 25G X 1 1/2

## (undated) DEVICE — BETADINE SURGICAL SCRUB 32OZ

## (undated) DEVICE — CURITY NON-ADHERENT STRIPS: Brand: CURITY

## (undated) DEVICE — SUT VICRYL 0 REEL 54 IN J287G

## (undated) DEVICE — CURITY STRETCH BANDAGE: Brand: CURITY

## (undated) DEVICE — SUT SILK 2-0 18 IN A185H

## (undated) DEVICE — PINNACLE R/O II INTRODUCER SHEATH WITH RADIOPAQUE MARKER: Brand: PINNACLE

## (undated) DEVICE — ACE WRAP 6 IN UNSTERILE

## (undated) DEVICE — ASTOUND STANDARD SURGICAL GOWN, XL: Brand: CONVERTORS

## (undated) DEVICE — 3M™ IOBAN™ 2 ANTIMICROBIAL INCISE DRAPE 6650EZ: Brand: IOBAN™ 2

## (undated) DEVICE — Device

## (undated) DEVICE — GAUZE SPONGES,16 PLY: Brand: CURITY

## (undated) DEVICE — SYRINGE KIT,PACKAGED,,150FT,MK 7(ANGIO-ARTERION, 150ML SYR KIT W/QFT,MC)(60729385): Brand: MEDRAD® MARK 7 ARTERION DISPOSABLE SYRINGE 150 ML WITH QUICK FILL TUBE

## (undated) DEVICE — BETHLEHEM UNIVERSAL MINOR GEN: Brand: CARDINAL HEALTH

## (undated) DEVICE — CATH BAL STERLING OTW 5 X 220MM 150CM

## (undated) DEVICE — DRESSING MEPILEX AG BORDER 4 X 8 IN

## (undated) DEVICE — 3M™ COBAN™ NL STERILE NON-LATEX SELF-ADHERENT WRAP, 2084S, 4 IN X 5 YD (10 CM X 4,5 M), 18 ROLLS/CASE: Brand: 3M™ COBAN™

## (undated) DEVICE — BANDAGE, ESMARK LF STR 6"X9' (20/CS): Brand: CYPRESS

## (undated) DEVICE — PROVE COVER: Brand: UNBRANDED

## (undated) DEVICE — TIBURON EXTREMITY SHEET: Brand: CONVERTORS

## (undated) DEVICE — NON-DEHP HIGH FLOW RATE EXTENSION SET, MALE LUER LOCK ADAPTER

## (undated) DEVICE — FOGARTY ARTERIAL EMBOLECTOMY CATHETER 4F 80CM: Brand: FOGARTY

## (undated) DEVICE — BAG DECANTER

## (undated) DEVICE — NEPTUNE E-SEP SMOKE EVACUATION PENCIL, COATED, 70MM BLADE, PUSH BUTTON SWITCH: Brand: NEPTUNE E-SEP

## (undated) DEVICE — RADIFOCUS GLIDEWIRE ADVANTAGE GUIDEWIRE: Brand: GLIDEWIRE ADVANTAGE

## (undated) DEVICE — CATH ANGIO 4FR 70CM  2CM SEGMENT ACCU-VU

## (undated) DEVICE — PERCUTANEOUS ENTRY THINWALL NEEDLE  ONE-PART: Brand: COOK

## (undated) DEVICE — 40601 PROLONGED POSITIONING SYSTEM: Brand: 40601 PROLONGED POSITIONING SYSTEM

## (undated) DEVICE — CHLORAPREP HI-LITE 26ML ORANGE

## (undated) DEVICE — STRETCH BANDAGE: Brand: CURITY

## (undated) DEVICE — DRAPE SHEET THREE QUARTER

## (undated) DEVICE — SUT VICRYL 3-0 SH 27 IN J416H

## (undated) DEVICE — PROXIMATE PLUS MD MULTI-DIRECTIONAL RELEASE SKIN STAPLERS CONTAINS 35 STAINLESS STEEL STAPLES APPROXIMATE CLOSED DIMENSIONS: 6.9MM X 3.9MM WIDE: Brand: PROXIMATE

## (undated) DEVICE — STERILE BETHLEHEM FEM POP PACK: Brand: CARDINAL HEALTH

## (undated) DEVICE — SPONGE STICK WITH PVP-I: Brand: KENDALL

## (undated) DEVICE — FLUID MANAGEMENT KIT - IR

## (undated) DEVICE — CATH TEMPO AQUA 4FVER135Â°100CM: Brand: TEMPO AQUA

## (undated) DEVICE — IV EXTENSION TUBING 33 IN

## (undated) DEVICE — 3000CC GUARDIAN II: Brand: GUARDIAN

## (undated) DEVICE — GLOVE INDICATOR PI UNDERGLOVE SZ 6.5 BLUE

## (undated) DEVICE — SPONGE LAP 18 X 18 IN

## (undated) DEVICE — SI BRITE TIP 6F 11CM STR: Brand: BRITE TIP

## (undated) DEVICE — CATH BAL STERLING OTW 4 X 220MM X 150CM

## (undated) DEVICE — ABDOMINAL PAD: Brand: DERMACEA

## (undated) DEVICE — MEDI-VAC YANK SUCT HNDL W/TPRD BULBOUS TIP: Brand: CARDINAL HEALTH

## (undated) DEVICE — SUT ETHILON 3-0 FSLX 30 IN 1673H

## (undated) DEVICE — BONE WAX WHITE: Brand: BONE WAX WHITE